# Patient Record
Sex: MALE | Race: WHITE | Employment: FULL TIME | URBAN - METROPOLITAN AREA
[De-identification: names, ages, dates, MRNs, and addresses within clinical notes are randomized per-mention and may not be internally consistent; named-entity substitution may affect disease eponyms.]

---

## 2017-01-13 ENCOUNTER — OFFICE VISIT (OUTPATIENT)
Dept: INTERNAL MEDICINE CLINIC | Age: 42
End: 2017-01-13

## 2017-01-13 VITALS
WEIGHT: 262 LBS | RESPIRATION RATE: 16 BRPM | SYSTOLIC BLOOD PRESSURE: 120 MMHG | BODY MASS INDEX: 35.49 KG/M2 | DIASTOLIC BLOOD PRESSURE: 80 MMHG | HEIGHT: 72 IN | HEART RATE: 96 BPM

## 2017-01-13 DIAGNOSIS — J01.90 ACUTE SINUSITIS, RECURRENCE NOT SPECIFIED, UNSPECIFIED LOCATION: Primary | ICD-10-CM

## 2017-01-13 PROCEDURE — 99213 OFFICE O/P EST LOW 20 MIN: CPT | Performed by: NURSE PRACTITIONER

## 2017-01-13 RX ORDER — AMOXICILLIN AND CLAVULANATE POTASSIUM 875; 125 MG/1; MG/1
1 TABLET, FILM COATED ORAL 2 TIMES DAILY
Qty: 14 TABLET | Refills: 0 | Status: SHIPPED | OUTPATIENT
Start: 2017-01-13 | End: 2017-01-20

## 2017-01-13 ASSESSMENT — ENCOUNTER SYMPTOMS
SHORTNESS OF BREATH: 0
NAUSEA: 0
SORE THROAT: 0
SWOLLEN GLANDS: 0
VOMITING: 0
EYE REDNESS: 1
EYE PAIN: 1
DIARRHEA: 0
SINUS PRESSURE: 1
COUGH: 0

## 2017-02-13 ENCOUNTER — TELEPHONE (OUTPATIENT)
Dept: INTERNAL MEDICINE CLINIC | Age: 42
End: 2017-02-13

## 2017-02-13 ENCOUNTER — OFFICE VISIT (OUTPATIENT)
Dept: INTERNAL MEDICINE CLINIC | Age: 42
End: 2017-02-13

## 2017-02-13 VITALS
SYSTOLIC BLOOD PRESSURE: 110 MMHG | DIASTOLIC BLOOD PRESSURE: 89 MMHG | HEIGHT: 72 IN | RESPIRATION RATE: 18 BRPM | BODY MASS INDEX: 34.13 KG/M2 | HEART RATE: 75 BPM | WEIGHT: 252 LBS

## 2017-02-13 DIAGNOSIS — A04.72 C. DIFFICILE COLITIS: ICD-10-CM

## 2017-02-13 DIAGNOSIS — I10 ESSENTIAL HYPERTENSION: ICD-10-CM

## 2017-02-13 DIAGNOSIS — L97.519 DIABETIC ULCER OF BOTH FEET ASSOCIATED WITH TYPE 2 DIABETES MELLITUS (HCC): ICD-10-CM

## 2017-02-13 DIAGNOSIS — E11.621 DIABETIC ULCER OF BOTH FEET ASSOCIATED WITH TYPE 2 DIABETES MELLITUS (HCC): ICD-10-CM

## 2017-02-13 DIAGNOSIS — E11.42 DIABETIC PERIPHERAL NEUROPATHY ASSOCIATED WITH TYPE 2 DIABETES MELLITUS (HCC): ICD-10-CM

## 2017-02-13 DIAGNOSIS — L97.529 DIABETIC ULCER OF BOTH FEET ASSOCIATED WITH TYPE 2 DIABETES MELLITUS (HCC): ICD-10-CM

## 2017-02-13 DIAGNOSIS — A04.72 C. DIFFICILE COLITIS: Primary | ICD-10-CM

## 2017-02-13 PROCEDURE — 99214 OFFICE O/P EST MOD 30 MIN: CPT | Performed by: INTERNAL MEDICINE

## 2017-02-13 RX ORDER — GABAPENTIN 300 MG/1
300 CAPSULE ORAL 3 TIMES DAILY
Qty: 180 CAPSULE | Refills: 2 | Status: SHIPPED
Start: 2017-02-13 | End: 2018-01-11 | Stop reason: SDUPTHER

## 2017-02-13 RX ORDER — LACTOBACILLUS RHAMNOSUS GG 10B CELL
1 CAPSULE ORAL 2 TIMES DAILY
Qty: 60 CAPSULE | Refills: 0 | Status: SHIPPED | OUTPATIENT
Start: 2017-02-13 | End: 2017-03-15

## 2017-02-13 RX ORDER — ONDANSETRON 4 MG/1
4 TABLET, ORALLY DISINTEGRATING ORAL EVERY 8 HOURS PRN
Qty: 30 TABLET | Refills: 0 | Status: SHIPPED | OUTPATIENT
Start: 2017-02-13 | End: 2017-02-20

## 2017-02-13 RX ORDER — METRONIDAZOLE 500 MG/1
500 TABLET ORAL 3 TIMES DAILY
Qty: 30 TABLET | Refills: 0 | Status: SHIPPED | OUTPATIENT
Start: 2017-02-13 | End: 2017-02-23

## 2017-02-14 LAB
A/G RATIO: 1.6 (ref 1.1–2.2)
ALBUMIN SERPL-MCNC: 4.3 G/DL (ref 3.4–5)
ALP BLD-CCNC: 117 U/L (ref 40–129)
ALT SERPL-CCNC: 29 U/L (ref 10–40)
ANION GAP SERPL CALCULATED.3IONS-SCNC: 20 MMOL/L (ref 3–16)
AST SERPL-CCNC: 15 U/L (ref 15–37)
BASOPHILS ABSOLUTE: 0.1 K/UL (ref 0–0.2)
BASOPHILS RELATIVE PERCENT: 0.9 %
BILIRUB SERPL-MCNC: 2.5 MG/DL (ref 0–1)
BUN BLDV-MCNC: 10 MG/DL (ref 7–20)
CALCIUM SERPL-MCNC: 9.4 MG/DL (ref 8.3–10.6)
CHLORIDE BLD-SCNC: 94 MMOL/L (ref 99–110)
CO2: 20 MMOL/L (ref 21–32)
CREAT SERPL-MCNC: 0.7 MG/DL (ref 0.9–1.3)
EOSINOPHILS ABSOLUTE: 0.1 K/UL (ref 0–0.6)
EOSINOPHILS RELATIVE PERCENT: 1.2 %
ESTIMATED AVERAGE GLUCOSE: 263.3 MG/DL
GFR AFRICAN AMERICAN: >60
GFR NON-AFRICAN AMERICAN: >60
GLOBULIN: 2.7 G/DL
GLUCOSE BLD-MCNC: 413 MG/DL (ref 70–99)
HBA1C MFR BLD: 10.8 %
HCT VFR BLD CALC: 50.2 % (ref 40.5–52.5)
HEMOGLOBIN: 16.9 G/DL (ref 13.5–17.5)
LYMPHOCYTES ABSOLUTE: 2.2 K/UL (ref 1–5.1)
LYMPHOCYTES RELATIVE PERCENT: 25 %
MCH RBC QN AUTO: 31.9 PG (ref 26–34)
MCHC RBC AUTO-ENTMCNC: 33.7 G/DL (ref 31–36)
MCV RBC AUTO: 94.7 FL (ref 80–100)
MONOCYTES ABSOLUTE: 0.7 K/UL (ref 0–1.3)
MONOCYTES RELATIVE PERCENT: 7.7 %
NEUTROPHILS ABSOLUTE: 5.7 K/UL (ref 1.7–7.7)
NEUTROPHILS RELATIVE PERCENT: 65.2 %
PDW BLD-RTO: 13.6 % (ref 12.4–15.4)
PLATELET # BLD: 195 K/UL (ref 135–450)
PMV BLD AUTO: 8.6 FL (ref 5–10.5)
POTASSIUM SERPL-SCNC: 4.3 MMOL/L (ref 3.5–5.1)
RBC # BLD: 5.3 M/UL (ref 4.2–5.9)
SODIUM BLD-SCNC: 134 MMOL/L (ref 136–145)
TOTAL PROTEIN: 7 G/DL (ref 6.4–8.2)
WBC # BLD: 8.7 K/UL (ref 4–11)

## 2017-02-27 DIAGNOSIS — I42.9 CARDIOMYOPATHY (HCC): ICD-10-CM

## 2017-02-27 RX ORDER — CARVEDILOL 25 MG/1
TABLET ORAL
Qty: 120 TABLET | Refills: 1 | Status: SHIPPED | OUTPATIENT
Start: 2017-02-27 | End: 2017-09-20

## 2017-03-21 DIAGNOSIS — F41.9 ANXIETY: ICD-10-CM

## 2017-03-21 RX ORDER — PAROXETINE 10 MG/1
TABLET, FILM COATED ORAL
Qty: 30 TABLET | Refills: 3 | Status: SHIPPED | OUTPATIENT
Start: 2017-03-21 | End: 2017-09-29 | Stop reason: SDUPTHER

## 2017-09-29 ENCOUNTER — OFFICE VISIT (OUTPATIENT)
Dept: INTERNAL MEDICINE CLINIC | Age: 42
End: 2017-09-29

## 2017-09-29 VITALS
WEIGHT: 246 LBS | DIASTOLIC BLOOD PRESSURE: 70 MMHG | HEIGHT: 72 IN | RESPIRATION RATE: 14 BRPM | HEART RATE: 70 BPM | SYSTOLIC BLOOD PRESSURE: 122 MMHG | BODY MASS INDEX: 33.32 KG/M2

## 2017-09-29 DIAGNOSIS — L97.519 DIABETIC ULCER OF BOTH FEET ASSOCIATED WITH TYPE 2 DIABETES MELLITUS (HCC): Primary | ICD-10-CM

## 2017-09-29 DIAGNOSIS — E78.2 MIXED HYPERLIPIDEMIA: ICD-10-CM

## 2017-09-29 DIAGNOSIS — L97.529 DIABETIC ULCER OF BOTH FEET ASSOCIATED WITH TYPE 2 DIABETES MELLITUS (HCC): Primary | ICD-10-CM

## 2017-09-29 DIAGNOSIS — E11.42 TYPE 2 DIABETES MELLITUS WITH DIABETIC POLYNEUROPATHY, WITH LONG-TERM CURRENT USE OF INSULIN (HCC): ICD-10-CM

## 2017-09-29 DIAGNOSIS — E11.621 DIABETIC ULCER OF BOTH FEET ASSOCIATED WITH TYPE 2 DIABETES MELLITUS (HCC): Primary | ICD-10-CM

## 2017-09-29 DIAGNOSIS — F41.9 ANXIETY: ICD-10-CM

## 2017-09-29 DIAGNOSIS — Z79.4 TYPE 2 DIABETES MELLITUS WITH DIABETIC POLYNEUROPATHY, WITH LONG-TERM CURRENT USE OF INSULIN (HCC): ICD-10-CM

## 2017-09-29 DIAGNOSIS — I10 ESSENTIAL HYPERTENSION: ICD-10-CM

## 2017-09-29 PROCEDURE — 99214 OFFICE O/P EST MOD 30 MIN: CPT | Performed by: NURSE PRACTITIONER

## 2017-09-29 RX ORDER — PAROXETINE 10 MG/1
10 TABLET, FILM COATED ORAL EVERY MORNING
Qty: 30 TABLET | Refills: 3 | Status: SHIPPED | OUTPATIENT
Start: 2017-09-29 | End: 2017-11-09 | Stop reason: SDUPTHER

## 2017-09-29 RX ORDER — CARVEDILOL 25 MG/1
25 TABLET ORAL 2 TIMES DAILY WITH MEALS
Qty: 30 TABLET | Refills: 1 | Status: SHIPPED | OUTPATIENT
Start: 2017-09-29 | End: 2017-11-09 | Stop reason: SDUPTHER

## 2017-09-29 ASSESSMENT — ENCOUNTER SYMPTOMS
COUGH: 0
DIARRHEA: 0
NAUSEA: 0
SHORTNESS OF BREATH: 0
VOMITING: 0
ABDOMINAL PAIN: 0

## 2017-11-09 ENCOUNTER — OFFICE VISIT (OUTPATIENT)
Dept: INTERNAL MEDICINE CLINIC | Age: 42
End: 2017-11-09

## 2017-11-09 VITALS
RESPIRATION RATE: 18 BRPM | HEART RATE: 115 BPM | WEIGHT: 246 LBS | HEIGHT: 72 IN | SYSTOLIC BLOOD PRESSURE: 155 MMHG | DIASTOLIC BLOOD PRESSURE: 90 MMHG | BODY MASS INDEX: 33.32 KG/M2

## 2017-11-09 DIAGNOSIS — F41.9 ANXIETY: ICD-10-CM

## 2017-11-09 DIAGNOSIS — I42.0 DILATED CARDIOMYOPATHY (HCC): ICD-10-CM

## 2017-11-09 DIAGNOSIS — Z79.4 TYPE 2 DIABETES MELLITUS WITH DIABETIC POLYNEUROPATHY, WITH LONG-TERM CURRENT USE OF INSULIN (HCC): ICD-10-CM

## 2017-11-09 DIAGNOSIS — I10 ESSENTIAL HYPERTENSION: ICD-10-CM

## 2017-11-09 DIAGNOSIS — E11.42 TYPE 2 DIABETES MELLITUS WITH DIABETIC POLYNEUROPATHY, WITH LONG-TERM CURRENT USE OF INSULIN (HCC): ICD-10-CM

## 2017-11-09 DIAGNOSIS — E78.2 MIXED HYPERLIPIDEMIA: ICD-10-CM

## 2017-11-09 DIAGNOSIS — E11.42 DIABETIC PERIPHERAL NEUROPATHY ASSOCIATED WITH TYPE 2 DIABETES MELLITUS (HCC): ICD-10-CM

## 2017-11-09 DIAGNOSIS — F32.4 MAJOR DEPRESSIVE DISORDER WITH SINGLE EPISODE, IN PARTIAL REMISSION (HCC): ICD-10-CM

## 2017-11-09 LAB
A/G RATIO: 2 (ref 1.1–2.2)
ALBUMIN SERPL-MCNC: 4.6 G/DL (ref 3.4–5)
ALP BLD-CCNC: 142 U/L (ref 40–129)
ALT SERPL-CCNC: 35 U/L (ref 10–40)
ANION GAP SERPL CALCULATED.3IONS-SCNC: 18 MMOL/L (ref 3–16)
AST SERPL-CCNC: 15 U/L (ref 15–37)
BASOPHILS ABSOLUTE: 0.1 K/UL (ref 0–0.2)
BASOPHILS RELATIVE PERCENT: 0.8 %
BILIRUB SERPL-MCNC: 2.9 MG/DL (ref 0–1)
BUN BLDV-MCNC: 13 MG/DL (ref 7–20)
CALCIUM SERPL-MCNC: 9.7 MG/DL (ref 8.3–10.6)
CHLORIDE BLD-SCNC: 94 MMOL/L (ref 99–110)
CO2: 23 MMOL/L (ref 21–32)
CREAT SERPL-MCNC: 0.5 MG/DL (ref 0.9–1.3)
CREATININE URINE: 40.7 MG/DL (ref 39–259)
EOSINOPHILS ABSOLUTE: 0.2 K/UL (ref 0–0.6)
EOSINOPHILS RELATIVE PERCENT: 1.6 %
GFR AFRICAN AMERICAN: >60
GFR NON-AFRICAN AMERICAN: >60
GLOBULIN: 2.3 G/DL
GLUCOSE BLD-MCNC: 477 MG/DL (ref 70–99)
HCT VFR BLD CALC: 46.6 % (ref 40.5–52.5)
HEMOGLOBIN: 16.3 G/DL (ref 13.5–17.5)
LYMPHOCYTES ABSOLUTE: 2.5 K/UL (ref 1–5.1)
LYMPHOCYTES RELATIVE PERCENT: 23.2 %
MCH RBC QN AUTO: 31.9 PG (ref 26–34)
MCHC RBC AUTO-ENTMCNC: 35 G/DL (ref 31–36)
MCV RBC AUTO: 91.1 FL (ref 80–100)
MICROALBUMIN UR-MCNC: <1.2 MG/DL
MICROALBUMIN/CREAT UR-RTO: NORMAL MG/G (ref 0–30)
MONOCYTES ABSOLUTE: 0.8 K/UL (ref 0–1.3)
MONOCYTES RELATIVE PERCENT: 7.3 %
NEUTROPHILS ABSOLUTE: 7.2 K/UL (ref 1.7–7.7)
NEUTROPHILS RELATIVE PERCENT: 67.1 %
PDW BLD-RTO: 13 % (ref 12.4–15.4)
PLATELET # BLD: 220 K/UL (ref 135–450)
PMV BLD AUTO: 8.8 FL (ref 5–10.5)
POTASSIUM SERPL-SCNC: 4.2 MMOL/L (ref 3.5–5.1)
RBC # BLD: 5.11 M/UL (ref 4.2–5.9)
SODIUM BLD-SCNC: 135 MMOL/L (ref 136–145)
TOTAL PROTEIN: 6.9 G/DL (ref 6.4–8.2)
WBC # BLD: 10.7 K/UL (ref 4–11)

## 2017-11-09 PROCEDURE — 99214 OFFICE O/P EST MOD 30 MIN: CPT | Performed by: INTERNAL MEDICINE

## 2017-11-09 PROCEDURE — 81002 URINALYSIS NONAUTO W/O SCOPE: CPT | Performed by: INTERNAL MEDICINE

## 2017-11-09 RX ORDER — LOSARTAN POTASSIUM 50 MG/1
50 TABLET ORAL DAILY
Qty: 30 TABLET | Refills: 5 | Status: SHIPPED | OUTPATIENT
Start: 2017-11-09 | End: 2018-02-27 | Stop reason: SDUPTHER

## 2017-11-09 RX ORDER — SIMVASTATIN 20 MG
TABLET ORAL
Qty: 30 TABLET | Refills: 5 | Status: SHIPPED | OUTPATIENT
Start: 2017-11-09 | End: 2018-02-27 | Stop reason: SDUPTHER

## 2017-11-09 RX ORDER — CARVEDILOL 25 MG/1
25 TABLET ORAL 2 TIMES DAILY WITH MEALS
Qty: 30 TABLET | Refills: 5 | Status: SHIPPED | OUTPATIENT
Start: 2017-11-09 | End: 2018-01-11 | Stop reason: SDUPTHER

## 2017-11-09 RX ORDER — PAROXETINE 10 MG/1
10 TABLET, FILM COATED ORAL EVERY MORNING
Qty: 30 TABLET | Refills: 5 | Status: SHIPPED | OUTPATIENT
Start: 2017-11-09 | End: 2018-02-27 | Stop reason: SDUPTHER

## 2017-11-09 RX ORDER — GLIMEPIRIDE 4 MG/1
4 TABLET ORAL
Qty: 30 TABLET | Refills: 5 | Status: SHIPPED | OUTPATIENT
Start: 2017-11-09 | End: 2018-02-27 | Stop reason: SDUPTHER

## 2017-11-09 ASSESSMENT — PATIENT HEALTH QUESTIONNAIRE - PHQ9
SUM OF ALL RESPONSES TO PHQ9 QUESTIONS 1 & 2: 0
1. LITTLE INTEREST OR PLEASURE IN DOING THINGS: 0
2. FEELING DOWN, DEPRESSED OR HOPELESS: 0
SUM OF ALL RESPONSES TO PHQ QUESTIONS 1-9: 0

## 2017-11-09 NOTE — TELEPHONE ENCOUNTER
----- Message from Ravi Hanna MD sent at 11/9/2017  4:28 PM EST -----  Contact: [phone]  Yes please    ----- Message -----  From: Brayden Gonzalez  Sent: 11/9/2017   3:15 PM  To: Ravi Hanna MD    Pharmacy called stating Pt's carvedilol (COREG) 25 MG tablet directions are for bid. Ok to change quantity to #60 instead of 30?
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
## 2017-11-10 LAB
ESTIMATED AVERAGE GLUCOSE: 243.2 MG/DL
HBA1C MFR BLD: 10.1 %

## 2017-11-10 RX ORDER — CARVEDILOL 25 MG/1
25 TABLET ORAL 2 TIMES DAILY WITH MEALS
Qty: 60 TABLET | Refills: 5 | OUTPATIENT
Start: 2017-11-10

## 2017-11-13 NOTE — TELEPHONE ENCOUNTER
----- Message from Matthew Crisostomo sent at 11/13/2017  3:05 PM EST -----  Contact: [facility] called in to [place] [phone]  For test strips and lancets  Contour Next EZ

## 2017-11-15 RX ORDER — LANCETS 30 GAUGE
EACH MISCELLANEOUS
Qty: 100 EACH | Refills: 11 | Status: SHIPPED | OUTPATIENT
Start: 2017-11-15 | End: 2017-11-16 | Stop reason: SDUPTHER

## 2017-11-16 RX ORDER — LANCETS 30 GAUGE
EACH MISCELLANEOUS
Qty: 100 EACH | Refills: 11 | Status: SHIPPED | OUTPATIENT
Start: 2017-11-16 | End: 2020-10-26

## 2018-01-10 ENCOUNTER — TELEPHONE (OUTPATIENT)
Dept: INTERNAL MEDICINE CLINIC | Age: 43
End: 2018-01-10

## 2018-01-11 ENCOUNTER — OFFICE VISIT (OUTPATIENT)
Dept: INTERNAL MEDICINE CLINIC | Age: 43
End: 2018-01-11

## 2018-01-11 VITALS
SYSTOLIC BLOOD PRESSURE: 160 MMHG | BODY MASS INDEX: 34.81 KG/M2 | WEIGHT: 257 LBS | HEART RATE: 90 BPM | DIASTOLIC BLOOD PRESSURE: 110 MMHG | HEIGHT: 72 IN

## 2018-01-11 DIAGNOSIS — G89.4 CHRONIC PAIN SYNDROME: ICD-10-CM

## 2018-01-11 DIAGNOSIS — Z79.4 TYPE 2 DIABETES MELLITUS WITH DIABETIC POLYNEUROPATHY, WITH LONG-TERM CURRENT USE OF INSULIN (HCC): ICD-10-CM

## 2018-01-11 DIAGNOSIS — E11.42 TYPE 2 DIABETES MELLITUS WITH DIABETIC POLYNEUROPATHY, WITH LONG-TERM CURRENT USE OF INSULIN (HCC): ICD-10-CM

## 2018-01-11 DIAGNOSIS — E11.42 DIABETIC PERIPHERAL NEUROPATHY ASSOCIATED WITH TYPE 2 DIABETES MELLITUS (HCC): Primary | ICD-10-CM

## 2018-01-11 PROCEDURE — 99214 OFFICE O/P EST MOD 30 MIN: CPT | Performed by: PHYSICIAN ASSISTANT

## 2018-01-11 RX ORDER — CARVEDILOL 25 MG/1
25 TABLET ORAL 2 TIMES DAILY WITH MEALS
Qty: 60 TABLET | Refills: 1 | Status: SHIPPED | OUTPATIENT
Start: 2018-01-11 | End: 2018-02-27 | Stop reason: SDUPTHER

## 2018-01-11 RX ORDER — TIZANIDINE 4 MG/1
4 TABLET ORAL NIGHTLY PRN
Qty: 30 TABLET | Refills: 1 | Status: SHIPPED | OUTPATIENT
Start: 2018-01-11 | End: 2018-02-27 | Stop reason: SDUPTHER

## 2018-01-11 RX ORDER — TIZANIDINE 4 MG/1
4 TABLET ORAL NIGHTLY PRN
COMMUNITY
End: 2018-01-11 | Stop reason: SDUPTHER

## 2018-01-11 RX ORDER — GABAPENTIN 300 MG/1
600 CAPSULE ORAL 3 TIMES DAILY
Qty: 180 CAPSULE | Refills: 1 | Status: SHIPPED | OUTPATIENT
Start: 2018-01-11 | End: 2018-03-06 | Stop reason: SDUPTHER

## 2018-01-11 ASSESSMENT — ENCOUNTER SYMPTOMS
SHORTNESS OF BREATH: 0
BACK PAIN: 0

## 2018-01-11 NOTE — PROGRESS NOTES
Chief Complaint   Patient presents with    Peripheral Neuropathy        HPI  Carlos Alberto Landeros is a 38 y.o. male who presents for evaluation of peripheral neuropathy. He used to follow with Dr. Da Martel. There was an issue with a missed appointment, followed by running out of opiates, followed by a visit to Dr. Da Martel with a negative drug screen which prompted discharge from the practice. He has been without his gabapentin, morphine, and oxycodone     Also needs Tujeo adjustment. Has been having fasting sugars in the mornings to 220 and during the day random sugars are around 180. He has noticed lows when he does not eat, but if he is eating he is consistently high     Review of Systems   Constitutional: Negative for chills and fever. Respiratory: Negative for shortness of breath. Cardiovascular: Negative for chest pain. Musculoskeletal: Negative for back pain and neck pain. + neuropathic pain        Allergies  Januvia [sitagliptin] and Mustard oil [allyl isothiocyanate]      Vitals    Vitals:    01/11/18 0940 01/11/18 1023   BP: (!) 160/110 (!) 160/110   Site: Right Arm    Position: Sitting    Cuff Size: Medium Adult    Pulse: 90    Weight: 257 lb (116.6 kg)    Height: 6' (1.829 m)         Current Medications  Current Outpatient Prescriptions   Medication Sig Dispense Refill    gabapentin (NEURONTIN) 300 MG capsule Take 2 capsules by mouth 3 times daily for 30 days. 180 capsule 1    tiZANidine (ZANAFLEX) 4 MG tablet Take 1 tablet by mouth nightly as needed (muscle spasms) 30 tablet 1    insulin glargine (TOUJEO SOLOSTAR) 300 UNIT/ML injection pen Inject 15 Units into the skin nightly 1 mL 0    carvedilol (COREG) 25 MG tablet Take 1 tablet by mouth 2 times daily (with meals) 60 tablet 1    glucose blood VI test strips (ASCENSIA AUTODISC VI;ONE TOUCH ULTRA TEST VI) strip Test threes time a day. DX: E11.9 100 each 11    Lancets MISC Test three times a day with Contour Next EZ machine.   DX: E11.9 100 each 11    losartan (COZAAR) 50 MG tablet Take 1 tablet by mouth daily 30 tablet 5    glimepiride (AMARYL) 4 MG tablet Take 1 tablet by mouth every morning (before breakfast) 30 tablet 5    simvastatin (ZOCOR) 20 MG tablet TAKE ONE TABLET BY MOUTH ONCE NIGHTLY 30 tablet 5    PARoxetine (PAXIL) 10 MG tablet Take 1 tablet by mouth every morning 30 tablet 5    insulin lispro (HUMALOG KWIKPEN) 100 UNIT/ML pen Inject 5 Units into the skin 3 times daily (before meals) 5 Pen 3    Insulin Syringe-Needle U-100 (Code42CO INS SYR .3CC/29GX0.5\") 29G X 1/2\" 0.3 ML MISC 1 each by Does not apply route daily. 100 each 3    glucose blood VI test strips (EXACTECH TEST) strip 1 each by In Vitro route daily. As needed. 100 each 0     No current facility-administered medications for this visit. Past Medical History  Past Medical History:   Diagnosis Date    Arthritis     OA    Clostridium difficile infection 11/01/2016    PCR+    Diabetes mellitus ([facility] [address]     TYPE 2- NO MEDS SINCE WEIGHT LOSS    Hyperlipidemia     Hypertension     Medial meniscus tear     LEFT    Osteoarthritis     Type II or unspecified type diabetes mellitus without mention of complication, not stated as uncontrolled        Social History  Social History     Social History    Marital status:      Spouse name: N/A    Number of children: N/A    Years of education: N/A     Occupational History    Not on file.      Social History Main Topics    Smoking status: Former Smoker     Packs/day: 0.50     Years: 2.00     Quit date: 8/13/2005    Smokeless tobacco: Former User     Quit date: 8/13/2005      Comment: SMOKED OCCASIONALLY UNTIL 8 YEARS AGO    Alcohol use Yes      Comment: RARELY    Drug use: No    Sexual activity: Yes     Partners: Female     Other Topics Concern    Not on file     Social History Narrative    No narrative on file       Surgical History  Past Surgical History:   Procedure Laterality Date    KNEE ARTHROSCOPY

## 2018-02-27 ENCOUNTER — OFFICE VISIT (OUTPATIENT)
Dept: INTERNAL MEDICINE CLINIC | Age: 43
End: 2018-02-27

## 2018-02-27 VITALS
HEIGHT: 72 IN | DIASTOLIC BLOOD PRESSURE: 90 MMHG | RESPIRATION RATE: 18 BRPM | WEIGHT: 257 LBS | BODY MASS INDEX: 34.81 KG/M2 | HEART RATE: 70 BPM | SYSTOLIC BLOOD PRESSURE: 145 MMHG

## 2018-02-27 DIAGNOSIS — I10 ESSENTIAL HYPERTENSION: ICD-10-CM

## 2018-02-27 DIAGNOSIS — E11.42 TYPE 2 DIABETES MELLITUS WITH DIABETIC POLYNEUROPATHY, WITH LONG-TERM CURRENT USE OF INSULIN (HCC): Primary | ICD-10-CM

## 2018-02-27 DIAGNOSIS — I42.8 OTHER CARDIOMYOPATHY (HCC): ICD-10-CM

## 2018-02-27 DIAGNOSIS — F41.9 ANXIETY: ICD-10-CM

## 2018-02-27 DIAGNOSIS — E78.2 MIXED HYPERLIPIDEMIA: ICD-10-CM

## 2018-02-27 DIAGNOSIS — E11.42 DIABETIC PERIPHERAL NEUROPATHY ASSOCIATED WITH TYPE 2 DIABETES MELLITUS (HCC): ICD-10-CM

## 2018-02-27 DIAGNOSIS — Z79.4 TYPE 2 DIABETES MELLITUS WITH DIABETIC POLYNEUROPATHY, WITH LONG-TERM CURRENT USE OF INSULIN (HCC): Primary | ICD-10-CM

## 2018-02-27 DIAGNOSIS — Z79.4 TYPE 2 DIABETES MELLITUS WITH DIABETIC POLYNEUROPATHY, WITH LONG-TERM CURRENT USE OF INSULIN (HCC): ICD-10-CM

## 2018-02-27 DIAGNOSIS — E11.42 TYPE 2 DIABETES MELLITUS WITH DIABETIC POLYNEUROPATHY, WITH LONG-TERM CURRENT USE OF INSULIN (HCC): ICD-10-CM

## 2018-02-27 PROCEDURE — 99214 OFFICE O/P EST MOD 30 MIN: CPT | Performed by: INTERNAL MEDICINE

## 2018-02-27 RX ORDER — GLIMEPIRIDE 4 MG/1
4 TABLET ORAL
Qty: 30 TABLET | Refills: 5 | Status: SHIPPED | OUTPATIENT
Start: 2018-02-27 | End: 2018-07-23 | Stop reason: SDUPTHER

## 2018-02-27 RX ORDER — CARVEDILOL 25 MG/1
25 TABLET ORAL 2 TIMES DAILY WITH MEALS
Qty: 60 TABLET | Refills: 3 | Status: SHIPPED | OUTPATIENT
Start: 2018-02-27 | End: 2018-07-23

## 2018-02-27 RX ORDER — SIMVASTATIN 20 MG
TABLET ORAL
Qty: 30 TABLET | Refills: 5 | Status: SHIPPED | OUTPATIENT
Start: 2018-02-27 | End: 2018-07-23 | Stop reason: SDUPTHER

## 2018-02-27 RX ORDER — TIZANIDINE 4 MG/1
4 TABLET ORAL NIGHTLY PRN
Qty: 30 TABLET | Refills: 3 | Status: SHIPPED | OUTPATIENT
Start: 2018-02-27 | End: 2018-06-29 | Stop reason: SDUPTHER

## 2018-02-27 RX ORDER — PAROXETINE 10 MG/1
10 TABLET, FILM COATED ORAL EVERY MORNING
Qty: 30 TABLET | Refills: 5 | Status: SHIPPED | OUTPATIENT
Start: 2018-02-27 | End: 2018-07-23 | Stop reason: SDUPTHER

## 2018-02-27 RX ORDER — LOSARTAN POTASSIUM 50 MG/1
50 TABLET ORAL DAILY
Qty: 30 TABLET | Refills: 5 | Status: SHIPPED | OUTPATIENT
Start: 2018-02-27 | End: 2018-07-23 | Stop reason: SDUPTHER

## 2018-02-27 RX ORDER — DOXYCYCLINE 100 MG/1
100 TABLET ORAL 2 TIMES DAILY
Qty: 10 TABLET | Refills: 0 | Status: SHIPPED | OUTPATIENT
Start: 2018-02-27 | End: 2018-03-04

## 2018-02-27 NOTE — PATIENT INSTRUCTIONS
Patient Self-Management Goal for Health Maintenance  Goal: I will schedule routine eye examinations with an eye specialist as directed by my provider.   Barriers: none  Plan for overcoming my barriers: N/A  Confidence: 10/10  Anticipated Goal Completion Date: 5/27/18

## 2018-02-27 NOTE — PROGRESS NOTES
Chronic Disease Visit Information    BP Readings from Last 3 Encounters:   01/11/18 (!) 160/110   11/09/17 (!) 155/90   09/29/17 122/70          Hemoglobin A1C   Date Value   11/09/2017 10.1 %   02/13/2017 10.8 %   09/14/2015 9.5 % of total Hgb (H)     Microalbumin, Random Urine (mg/dL)   Date Value   11/09/2017 <1.20     LDL Calculated (mg/dL)   Date Value   09/02/2014 see below     HDL (mg/dL)   Date Value   09/02/2014 26 (L)     BUN (mg/dL)   Date Value   11/09/2017 13     CREATININE (mg/dL)   Date Value   11/09/2017 0.5 (L)     Glucose (mg/dL)   Date Value   11/09/2017 477 (H)            Have you changed or started any medications since your last visit including any over-the-counter medicines, vitamins, or herbal medicines? no   Are you having any side effects from any of your medications? -  no  Have you stopped taking any of your medications? Is so, why? -  yes - He has not been taking insulin due to low sugars     Have you seen any other physician or provider since your last visit? No  Have you had any other diagnostic tests since your last visit? No  Have you been seen in the emergency room and/or had an admission to a hospital since we last saw you? No  Have you had your annual diabetic retinal (eye) exam? No  Have you had your routine dental cleaning in the past 6 months? no    Have you activated your [employer] account? If not, what are your barriers?  Yes     Patient Care Team:  Diego Hernandez MD as PCP - General (Internal Medicine)         Medical History Review  Past Medical, Family, and Social History reviewed and does not contribute to the patient presenting condition    Health Maintenance   Topic Date Due    Diabetic retinal exam  10/04/1987    HIV screen  10/04/1992    DTaP/Tdap/Td vaccine (1 - Tdap) 10/04/1996    Pneumococcal med risk (1 of 1 - PPSV23) 10/04/1996    Lipid screen  09/02/2015    Diabetic foot exam  04/19/2017    Flu vaccine (1) 09/01/2017    A1C test (Diabetic or

## 2018-02-28 LAB
A/G RATIO: 1.8 (ref 1.1–2.2)
ALBUMIN SERPL-MCNC: 4.2 G/DL (ref 3.4–5)
ALP BLD-CCNC: 141 U/L (ref 40–129)
ALT SERPL-CCNC: 24 U/L (ref 10–40)
ANION GAP SERPL CALCULATED.3IONS-SCNC: 17 MMOL/L (ref 3–16)
AST SERPL-CCNC: 14 U/L (ref 15–37)
BILIRUB SERPL-MCNC: 1.1 MG/DL (ref 0–1)
BUN BLDV-MCNC: 11 MG/DL (ref 7–20)
CALCIUM SERPL-MCNC: 8.8 MG/DL (ref 8.3–10.6)
CHLORIDE BLD-SCNC: 94 MMOL/L (ref 99–110)
CO2: 25 MMOL/L (ref 21–32)
CREAT SERPL-MCNC: 0.6 MG/DL (ref 0.9–1.3)
ESTIMATED AVERAGE GLUCOSE: 240.3 MG/DL
GFR AFRICAN AMERICAN: >60
GFR NON-AFRICAN AMERICAN: >60
GLOBULIN: 2.4 G/DL
GLUCOSE BLD-MCNC: 384 MG/DL (ref 70–99)
HBA1C MFR BLD: 10 %
POTASSIUM SERPL-SCNC: 4.2 MMOL/L (ref 3.5–5.1)
SODIUM BLD-SCNC: 136 MMOL/L (ref 136–145)
TOTAL PROTEIN: 6.6 G/DL (ref 6.4–8.2)
URIC ACID, SERUM: 4.3 MG/DL (ref 3.5–7.2)

## 2018-03-07 RX ORDER — GABAPENTIN 300 MG/1
600 CAPSULE ORAL 3 TIMES DAILY
Qty: 180 CAPSULE | Refills: 1 | Status: SHIPPED | OUTPATIENT
Start: 2018-03-07 | End: 2018-05-02 | Stop reason: SDUPTHER

## 2018-05-03 RX ORDER — GABAPENTIN 300 MG/1
600 CAPSULE ORAL 3 TIMES DAILY
Qty: 180 CAPSULE | Refills: 1 | Status: SHIPPED | OUTPATIENT
Start: 2018-05-03 | End: 2018-06-29 | Stop reason: SDUPTHER

## 2018-06-29 RX ORDER — TIZANIDINE 4 MG/1
4 TABLET ORAL NIGHTLY PRN
Qty: 30 TABLET | Refills: 0 | Status: SHIPPED | OUTPATIENT
Start: 2018-06-29 | End: 2018-07-23

## 2018-06-29 RX ORDER — GABAPENTIN 300 MG/1
600 CAPSULE ORAL 3 TIMES DAILY
Qty: 180 CAPSULE | Refills: 1 | Status: SHIPPED | OUTPATIENT
Start: 2018-06-29 | End: 2018-07-02 | Stop reason: SDUPTHER

## 2018-06-29 RX ORDER — GABAPENTIN 300 MG/1
600 CAPSULE ORAL 3 TIMES DAILY
Qty: 180 CAPSULE | Refills: 1 | OUTPATIENT
Start: 2018-06-29 | End: 2018-07-29

## 2018-07-02 DIAGNOSIS — E11.42 DIABETIC PERIPHERAL NEUROPATHY ASSOCIATED WITH TYPE 2 DIABETES MELLITUS (HCC): Primary | ICD-10-CM

## 2018-07-02 RX ORDER — GABAPENTIN 300 MG/1
600 CAPSULE ORAL 3 TIMES DAILY
Qty: 180 CAPSULE | Refills: 0 | Status: SHIPPED | OUTPATIENT
Start: 2018-07-02 | End: 2018-09-22 | Stop reason: SDUPTHER

## 2018-07-23 ENCOUNTER — OFFICE VISIT (OUTPATIENT)
Dept: INTERNAL MEDICINE CLINIC | Age: 43
End: 2018-07-23

## 2018-07-23 VITALS
HEIGHT: 72 IN | RESPIRATION RATE: 18 BRPM | DIASTOLIC BLOOD PRESSURE: 75 MMHG | WEIGHT: 260 LBS | BODY MASS INDEX: 35.21 KG/M2 | SYSTOLIC BLOOD PRESSURE: 140 MMHG | HEART RATE: 95 BPM

## 2018-07-23 DIAGNOSIS — E11.42 TYPE 2 DIABETES MELLITUS WITH DIABETIC POLYNEUROPATHY, WITH LONG-TERM CURRENT USE OF INSULIN (HCC): ICD-10-CM

## 2018-07-23 DIAGNOSIS — I42.0 DILATED CARDIOMYOPATHY (HCC): ICD-10-CM

## 2018-07-23 DIAGNOSIS — I10 ESSENTIAL HYPERTENSION: ICD-10-CM

## 2018-07-23 DIAGNOSIS — Z79.4 TYPE 2 DIABETES MELLITUS WITH DIABETIC POLYNEUROPATHY, WITH LONG-TERM CURRENT USE OF INSULIN (HCC): ICD-10-CM

## 2018-07-23 DIAGNOSIS — I50.22 CHRONIC SYSTOLIC CONGESTIVE HEART FAILURE (HCC): Primary | ICD-10-CM

## 2018-07-23 DIAGNOSIS — F41.9 ANXIETY: ICD-10-CM

## 2018-07-23 DIAGNOSIS — I50.22 CHRONIC SYSTOLIC CONGESTIVE HEART FAILURE (HCC): ICD-10-CM

## 2018-07-23 LAB
A/G RATIO: 2.3 (ref 1.1–2.2)
ALBUMIN SERPL-MCNC: 4.3 G/DL (ref 3.4–5)
ALP BLD-CCNC: 82 U/L (ref 40–129)
ALT SERPL-CCNC: 26 U/L (ref 10–40)
ANION GAP SERPL CALCULATED.3IONS-SCNC: 16 MMOL/L (ref 3–16)
AST SERPL-CCNC: 21 U/L (ref 15–37)
BASOPHILS ABSOLUTE: 0.1 K/UL (ref 0–0.2)
BASOPHILS RELATIVE PERCENT: 0.6 %
BILIRUB SERPL-MCNC: 2.8 MG/DL (ref 0–1)
BUN BLDV-MCNC: 13 MG/DL (ref 7–20)
CALCIUM SERPL-MCNC: 9.5 MG/DL (ref 8.3–10.6)
CHLORIDE BLD-SCNC: 98 MMOL/L (ref 99–110)
CO2: 23 MMOL/L (ref 21–32)
CREAT SERPL-MCNC: 0.6 MG/DL (ref 0.9–1.3)
EOSINOPHILS ABSOLUTE: 0.1 K/UL (ref 0–0.6)
EOSINOPHILS RELATIVE PERCENT: 0.6 %
GFR AFRICAN AMERICAN: >60
GFR NON-AFRICAN AMERICAN: >60
GLOBULIN: 1.9 G/DL
GLUCOSE BLD-MCNC: 305 MG/DL (ref 70–99)
HCT VFR BLD CALC: 44.1 % (ref 40.5–52.5)
HEMOGLOBIN: 15.2 G/DL (ref 13.5–17.5)
LYMPHOCYTES ABSOLUTE: 2.1 K/UL (ref 1–5.1)
LYMPHOCYTES RELATIVE PERCENT: 20.2 %
MCH RBC QN AUTO: 32.9 PG (ref 26–34)
MCHC RBC AUTO-ENTMCNC: 34.5 G/DL (ref 31–36)
MCV RBC AUTO: 95.3 FL (ref 80–100)
MONOCYTES ABSOLUTE: 0.8 K/UL (ref 0–1.3)
MONOCYTES RELATIVE PERCENT: 7.5 %
NEUTROPHILS ABSOLUTE: 7.3 K/UL (ref 1.7–7.7)
NEUTROPHILS RELATIVE PERCENT: 71.1 %
PDW BLD-RTO: 14.1 % (ref 12.4–15.4)
PLATELET # BLD: 249 K/UL (ref 135–450)
PMV BLD AUTO: 8.5 FL (ref 5–10.5)
POTASSIUM SERPL-SCNC: 4.6 MMOL/L (ref 3.5–5.1)
PRO-BNP: 981 PG/ML (ref 0–124)
RBC # BLD: 4.63 M/UL (ref 4.2–5.9)
SODIUM BLD-SCNC: 137 MMOL/L (ref 136–145)
TOTAL PROTEIN: 6.2 G/DL (ref 6.4–8.2)
WBC # BLD: 10.2 K/UL (ref 4–11)

## 2018-07-23 PROCEDURE — 99213 OFFICE O/P EST LOW 20 MIN: CPT | Performed by: INTERNAL MEDICINE

## 2018-07-23 RX ORDER — SIMVASTATIN 20 MG
TABLET ORAL
Qty: 30 TABLET | Refills: 5 | Status: SHIPPED | OUTPATIENT
Start: 2018-07-23 | End: 2018-12-21 | Stop reason: SDUPTHER

## 2018-07-23 RX ORDER — LOSARTAN POTASSIUM 50 MG/1
50 TABLET ORAL DAILY
Qty: 30 TABLET | Refills: 5 | Status: SHIPPED | OUTPATIENT
Start: 2018-07-23 | End: 2018-10-09 | Stop reason: SDUPTHER

## 2018-07-23 RX ORDER — CARVEDILOL 12.5 MG/1
12.5 TABLET ORAL 2 TIMES DAILY WITH MEALS
Qty: 180 TABLET | Refills: 0 | Status: SHIPPED | OUTPATIENT
Start: 2018-07-23 | End: 2018-12-21 | Stop reason: ALTCHOICE

## 2018-07-23 RX ORDER — GLIMEPIRIDE 4 MG/1
4 TABLET ORAL
Qty: 30 TABLET | Refills: 5 | Status: SHIPPED | OUTPATIENT
Start: 2018-07-23 | End: 2018-12-21 | Stop reason: SDUPTHER

## 2018-07-23 RX ORDER — PAROXETINE 10 MG/1
10 TABLET, FILM COATED ORAL EVERY MORNING
Qty: 30 TABLET | Refills: 5 | Status: SHIPPED | OUTPATIENT
Start: 2018-07-23 | End: 2018-12-21 | Stop reason: SDUPTHER

## 2018-07-23 RX ORDER — FUROSEMIDE 20 MG/1
20 TABLET ORAL DAILY
Qty: 30 TABLET | Refills: 5 | Status: SHIPPED | OUTPATIENT
Start: 2018-07-23 | End: 2018-12-17 | Stop reason: SDUPTHER

## 2018-07-23 RX ORDER — TIZANIDINE 4 MG/1
4 TABLET ORAL NIGHTLY PRN
Qty: 30 TABLET | Refills: 5 | Status: CANCELLED | OUTPATIENT
Start: 2018-07-23 | End: 2018-08-22

## 2018-07-23 NOTE — PROGRESS NOTES
congestive heart failure (HCC)  COMPREHENSIVE METABOLIC PANEL    CBC WITH AUTO DIFFERENTIAL    BRAIN NATRIURETIC PEPTIDE (BNP)    Echocardiogram complete   2. Type 2 diabetes mellitus with diabetic polyneuropathy, with long-term current use of insulin ([facility])  glimepiride (AMARYL) 4 MG tablet    Hemoglobin A1c   3. Anxiety  PARoxetine (PAXIL) 10 MG tablet   4. Dilated cardiomyopathy ([facility] [address]     5.  Essential hypertension           Wt Readings from Last 3 Encounters:   07/23/18 260 lb (117.9 kg)   02/27/18 257 lb (116.6 kg)   01/11/18 257 lb (116.6 kg)       Cardiomyopathy - non ischemic , suspect acute on chronic CHF systolic  - resume lasix, check BNP,     - resume coreg, losartan   - need to be complaint  - repeat echo     DM- 2 chronically uncontrolled with symptoms and complications  - peripheral neuropathy, ED , skin ulcers  - should monitor better and take insulin regularly  - continue toujeo 12 at night, resume oral meds     Need eye exam    - continue statins, ACEI, need yearly eye exam        HTN - , doubt compliance -coreg, losartan     Peripheral neuropathy - from uncontrolled DM  - on gabapentin, off pain mx    Chronic diabetic ulcers  - no active cellulitis but has several open ulcers  - refer to wound care  again    ED - prn viagra    Depression - on paxil    F/w 3 months

## 2018-07-24 ENCOUNTER — TELEPHONE (OUTPATIENT)
Dept: INTERNAL MEDICINE CLINIC | Age: 43
End: 2018-07-24

## 2018-07-24 DIAGNOSIS — R79.89 ABNORMAL LFTS: Primary | ICD-10-CM

## 2018-07-24 LAB
ESTIMATED AVERAGE GLUCOSE: 208.7 MG/DL
HBA1C MFR BLD: 8.9 %

## 2018-07-26 RX ORDER — TIZANIDINE 4 MG/1
4 TABLET ORAL NIGHTLY PRN
Qty: 30 TABLET | Refills: 0 | OUTPATIENT
Start: 2018-07-26 | End: 2018-08-25

## 2018-07-27 RX ORDER — TIZANIDINE 4 MG/1
4 TABLET ORAL NIGHTLY PRN
Qty: 30 TABLET | Refills: 2 | Status: SHIPPED | OUTPATIENT
Start: 2018-07-27 | End: 2018-09-17 | Stop reason: SDUPTHER

## 2018-08-10 ENCOUNTER — HOSPITAL ENCOUNTER (OUTPATIENT)
Dept: GENERAL RADIOLOGY | Age: 43
Discharge: HOME | End: 2018-08-10
Payer: COMMERCIAL

## 2018-08-10 ENCOUNTER — OFFICE VISIT (OUTPATIENT)
Dept: INTERNAL MEDICINE CLINIC | Age: 43
End: 2018-08-10

## 2018-08-10 ENCOUNTER — HOSPITAL ENCOUNTER (OUTPATIENT)
Age: 43
Discharge: HOME | End: 2018-08-10
Payer: COMMERCIAL

## 2018-08-10 VITALS
DIASTOLIC BLOOD PRESSURE: 75 MMHG | SYSTOLIC BLOOD PRESSURE: 90 MMHG | HEIGHT: 72 IN | BODY MASS INDEX: 34.95 KG/M2 | HEART RATE: 60 BPM | WEIGHT: 258 LBS | RESPIRATION RATE: 18 BRPM

## 2018-08-10 DIAGNOSIS — R06.09 DYSPNEA ON EXERTION: ICD-10-CM

## 2018-08-10 DIAGNOSIS — R06.09 DYSPNEA ON EXERTION: Primary | ICD-10-CM

## 2018-08-10 DIAGNOSIS — J18.9 PNEUMONIA OF LEFT LUNG DUE TO INFECTIOUS ORGANISM, UNSPECIFIED PART OF LUNG: Primary | ICD-10-CM

## 2018-08-10 DIAGNOSIS — I50.43 ACUTE ON CHRONIC COMBINED SYSTOLIC AND DIASTOLIC CONGESTIVE HEART FAILURE (HCC): ICD-10-CM

## 2018-08-10 PROCEDURE — 71046 X-RAY EXAM CHEST 2 VIEWS: CPT

## 2018-08-10 PROCEDURE — 99213 OFFICE O/P EST LOW 20 MIN: CPT | Performed by: INTERNAL MEDICINE

## 2018-08-10 RX ORDER — LEVOFLOXACIN 500 MG/1
500 TABLET, FILM COATED ORAL DAILY
Qty: 7 TABLET | Refills: 0 | Status: SHIPPED | OUTPATIENT
Start: 2018-08-10 | End: 2018-08-17

## 2018-08-10 NOTE — PROGRESS NOTES
2 chronically uncontrolled with symptoms and complications  - peripheral neuropathy, ED , skin ulcers  - should monitor better and take insulin regularly  - continue toujeo 12 at night, resume oral meds   Need eye exam     - continue statins, ARB     F/w 3 months

## 2018-09-17 RX ORDER — TIZANIDINE 4 MG/1
4 TABLET ORAL NIGHTLY PRN
Qty: 30 TABLET | Refills: 2 | Status: SHIPPED | OUTPATIENT
Start: 2018-09-17 | End: 2018-12-17 | Stop reason: SDUPTHER

## 2018-09-22 DIAGNOSIS — E11.42 DIABETIC PERIPHERAL NEUROPATHY ASSOCIATED WITH TYPE 2 DIABETES MELLITUS (HCC): ICD-10-CM

## 2018-09-24 RX ORDER — GABAPENTIN 300 MG/1
600 CAPSULE ORAL 3 TIMES DAILY
Qty: 180 CAPSULE | Refills: 0 | Status: SHIPPED | OUTPATIENT
Start: 2018-09-24 | End: 2018-10-24 | Stop reason: SDUPTHER

## 2018-10-16 ENCOUNTER — TELEPHONE (OUTPATIENT)
Dept: INTERNAL MEDICINE CLINIC | Age: 43
End: 2018-10-16

## 2018-10-24 DIAGNOSIS — E11.42 DIABETIC PERIPHERAL NEUROPATHY ASSOCIATED WITH TYPE 2 DIABETES MELLITUS (HCC): ICD-10-CM

## 2018-10-25 RX ORDER — GABAPENTIN 300 MG/1
600 CAPSULE ORAL 3 TIMES DAILY
Qty: 180 CAPSULE | Refills: 0 | Status: SHIPPED | OUTPATIENT
Start: 2018-10-25 | End: 2018-11-21 | Stop reason: SDUPTHER

## 2018-11-21 DIAGNOSIS — E11.42 DIABETIC PERIPHERAL NEUROPATHY ASSOCIATED WITH TYPE 2 DIABETES MELLITUS (HCC): ICD-10-CM

## 2018-11-21 RX ORDER — GABAPENTIN 300 MG/1
600 CAPSULE ORAL 3 TIMES DAILY
Qty: 180 CAPSULE | Refills: 0 | Status: SHIPPED | OUTPATIENT
Start: 2018-11-21 | End: 2018-12-21 | Stop reason: SDUPTHER

## 2018-12-17 DIAGNOSIS — E11.42 DIABETIC PERIPHERAL NEUROPATHY ASSOCIATED WITH TYPE 2 DIABETES MELLITUS (HCC): ICD-10-CM

## 2018-12-17 RX ORDER — FUROSEMIDE 20 MG/1
20 TABLET ORAL DAILY
Qty: 30 TABLET | Refills: 0 | Status: SHIPPED | OUTPATIENT
Start: 2018-12-17 | End: 2018-12-21 | Stop reason: SDUPTHER

## 2018-12-17 RX ORDER — GABAPENTIN 300 MG/1
CAPSULE ORAL
Qty: 180 CAPSULE | Refills: 0 | OUTPATIENT
Start: 2018-12-17

## 2018-12-17 RX ORDER — TIZANIDINE 4 MG/1
4 TABLET ORAL NIGHTLY PRN
Qty: 30 TABLET | Refills: 0 | Status: SHIPPED | OUTPATIENT
Start: 2018-12-17 | End: 2019-01-15 | Stop reason: SDUPTHER

## 2018-12-17 RX ORDER — CARVEDILOL 12.5 MG/1
12.5 TABLET ORAL 2 TIMES DAILY WITH MEALS
Qty: 180 TABLET | Refills: 0 | Status: SHIPPED | OUTPATIENT
Start: 2018-12-17 | End: 2018-12-21 | Stop reason: SDUPTHER

## 2018-12-21 ENCOUNTER — OFFICE VISIT (OUTPATIENT)
Dept: INTERNAL MEDICINE CLINIC | Age: 43
End: 2018-12-21

## 2018-12-21 VITALS
HEIGHT: 72 IN | RESPIRATION RATE: 18 BRPM | DIASTOLIC BLOOD PRESSURE: 89 MMHG | BODY MASS INDEX: 36.03 KG/M2 | WEIGHT: 266 LBS | SYSTOLIC BLOOD PRESSURE: 125 MMHG | HEART RATE: 98 BPM

## 2018-12-21 DIAGNOSIS — I42.0 DILATED CARDIOMYOPATHY (HCC): ICD-10-CM

## 2018-12-21 DIAGNOSIS — I10 ESSENTIAL HYPERTENSION: ICD-10-CM

## 2018-12-21 DIAGNOSIS — E78.2 MIXED HYPERLIPIDEMIA: ICD-10-CM

## 2018-12-21 DIAGNOSIS — E11.42 TYPE 2 DIABETES MELLITUS WITH DIABETIC POLYNEUROPATHY, WITH LONG-TERM CURRENT USE OF INSULIN (HCC): ICD-10-CM

## 2018-12-21 DIAGNOSIS — Z79.4 TYPE 2 DIABETES MELLITUS WITH DIABETIC POLYNEUROPATHY, WITH LONG-TERM CURRENT USE OF INSULIN (HCC): ICD-10-CM

## 2018-12-21 DIAGNOSIS — E11.42 DIABETIC PERIPHERAL NEUROPATHY ASSOCIATED WITH TYPE 2 DIABETES MELLITUS (HCC): Primary | ICD-10-CM

## 2018-12-21 DIAGNOSIS — M54.6 CHRONIC MIDLINE THORACIC BACK PAIN: ICD-10-CM

## 2018-12-21 DIAGNOSIS — F41.9 ANXIETY: ICD-10-CM

## 2018-12-21 DIAGNOSIS — G89.29 CHRONIC MIDLINE THORACIC BACK PAIN: ICD-10-CM

## 2018-12-21 LAB
A/G RATIO: 1.7 (ref 1.1–2.2)
ALBUMIN SERPL-MCNC: 4.7 G/DL (ref 3.4–5)
ALP BLD-CCNC: 107 U/L (ref 40–129)
ALT SERPL-CCNC: 29 U/L (ref 10–40)
ANION GAP SERPL CALCULATED.3IONS-SCNC: 17 MMOL/L (ref 3–16)
AST SERPL-CCNC: 15 U/L (ref 15–37)
BASOPHILS ABSOLUTE: 0.1 K/UL (ref 0–0.2)
BASOPHILS RELATIVE PERCENT: 1.2 %
BILIRUB SERPL-MCNC: 1.8 MG/DL (ref 0–1)
BUN BLDV-MCNC: 18 MG/DL (ref 7–20)
CALCIUM SERPL-MCNC: 9.7 MG/DL (ref 8.3–10.6)
CHLORIDE BLD-SCNC: 95 MMOL/L (ref 99–110)
CHOLESTEROL, TOTAL: 296 MG/DL (ref 0–199)
CO2: 24 MMOL/L (ref 21–32)
CREAT SERPL-MCNC: 0.7 MG/DL (ref 0.9–1.3)
EOSINOPHILS ABSOLUTE: 0.2 K/UL (ref 0–0.6)
EOSINOPHILS RELATIVE PERCENT: 1.5 %
GFR AFRICAN AMERICAN: >60
GFR NON-AFRICAN AMERICAN: >60
GLOBULIN: 2.7 G/DL
GLUCOSE BLD-MCNC: 381 MG/DL (ref 70–99)
HCT VFR BLD CALC: 50.1 % (ref 40.5–52.5)
HDLC SERPL-MCNC: 26 MG/DL (ref 40–60)
HEMOGLOBIN: 17.4 G/DL (ref 13.5–17.5)
LDL CHOLESTEROL CALCULATED: ABNORMAL MG/DL
LYMPHOCYTES ABSOLUTE: 2.4 K/UL (ref 1–5.1)
LYMPHOCYTES RELATIVE PERCENT: 22.3 %
MCH RBC QN AUTO: 33.1 PG (ref 26–34)
MCHC RBC AUTO-ENTMCNC: 34.8 G/DL (ref 31–36)
MCV RBC AUTO: 95.3 FL (ref 80–100)
MONOCYTES ABSOLUTE: 0.8 K/UL (ref 0–1.3)
MONOCYTES RELATIVE PERCENT: 7.8 %
NEUTROPHILS ABSOLUTE: 7.3 K/UL (ref 1.7–7.7)
NEUTROPHILS RELATIVE PERCENT: 67.2 %
PDW BLD-RTO: 12.8 % (ref 12.4–15.4)
PLATELET # BLD: 223 K/UL (ref 135–450)
PMV BLD AUTO: 9.3 FL (ref 5–10.5)
POTASSIUM SERPL-SCNC: 4.6 MMOL/L (ref 3.5–5.1)
RBC # BLD: 5.26 M/UL (ref 4.2–5.9)
SODIUM BLD-SCNC: 136 MMOL/L (ref 136–145)
TOTAL PROTEIN: 7.4 G/DL (ref 6.4–8.2)
TRIGL SERPL-MCNC: 1289 MG/DL (ref 0–150)
VLDLC SERPL CALC-MCNC: ABNORMAL MG/DL
WBC # BLD: 10.9 K/UL (ref 4–11)

## 2018-12-21 PROCEDURE — 81002 URINALYSIS NONAUTO W/O SCOPE: CPT | Performed by: INTERNAL MEDICINE

## 2018-12-21 PROCEDURE — 99213 OFFICE O/P EST LOW 20 MIN: CPT | Performed by: INTERNAL MEDICINE

## 2018-12-21 RX ORDER — GABAPENTIN 300 MG/1
600 CAPSULE ORAL 3 TIMES DAILY
Qty: 180 CAPSULE | Refills: 0 | Status: SHIPPED | OUTPATIENT
Start: 2018-12-21 | End: 2019-01-22 | Stop reason: SDUPTHER

## 2018-12-21 RX ORDER — SIMVASTATIN 20 MG
TABLET ORAL
Qty: 30 TABLET | Refills: 2 | Status: SHIPPED | OUTPATIENT
Start: 2018-12-21 | End: 2019-05-02 | Stop reason: SDUPTHER

## 2018-12-21 RX ORDER — LOSARTAN POTASSIUM 50 MG/1
50 TABLET ORAL DAILY
Qty: 30 TABLET | Refills: 2 | Status: SHIPPED | OUTPATIENT
Start: 2018-12-21 | End: 2019-05-02 | Stop reason: SDUPTHER

## 2018-12-21 RX ORDER — PAROXETINE 10 MG/1
10 TABLET, FILM COATED ORAL EVERY MORNING
Qty: 30 TABLET | Refills: 2 | Status: SHIPPED | OUTPATIENT
Start: 2018-12-21 | End: 2019-05-02 | Stop reason: SDUPTHER

## 2018-12-21 RX ORDER — FUROSEMIDE 20 MG/1
20 TABLET ORAL DAILY
Qty: 30 TABLET | Refills: 2 | Status: SHIPPED | OUTPATIENT
Start: 2018-12-21 | End: 2019-05-02 | Stop reason: SDUPTHER

## 2018-12-21 RX ORDER — GLIMEPIRIDE 4 MG/1
4 TABLET ORAL
Qty: 30 TABLET | Refills: 2 | Status: SHIPPED | OUTPATIENT
Start: 2018-12-21 | End: 2019-05-02 | Stop reason: SDUPTHER

## 2018-12-21 RX ORDER — CARVEDILOL 12.5 MG/1
12.5 TABLET ORAL 2 TIMES DAILY WITH MEALS
Qty: 60 TABLET | Refills: 2 | Status: SHIPPED | OUTPATIENT
Start: 2018-12-21 | End: 2019-05-02 | Stop reason: SDUPTHER

## 2018-12-21 ASSESSMENT — PATIENT HEALTH QUESTIONNAIRE - PHQ9
1. LITTLE INTEREST OR PLEASURE IN DOING THINGS: 0
2. FEELING DOWN, DEPRESSED OR HOPELESS: 1
SUM OF ALL RESPONSES TO PHQ QUESTIONS 1-9: 1
SUM OF ALL RESPONSES TO PHQ QUESTIONS 1-9: 1
SUM OF ALL RESPONSES TO PHQ9 QUESTIONS 1 & 2: 1

## 2018-12-22 LAB
ESTIMATED AVERAGE GLUCOSE: 197.3 MG/DL
HBA1C MFR BLD: 8.5 %
LDL CHOLESTEROL DIRECT: 45 MG/DL

## 2018-12-24 ENCOUNTER — TELEPHONE (OUTPATIENT)
Dept: INTERNAL MEDICINE CLINIC | Age: 43
End: 2018-12-24

## 2018-12-24 RX ORDER — OMEGA-3-ACID ETHYL ESTERS 1 G/1
1 CAPSULE, LIQUID FILLED ORAL 2 TIMES DAILY
Qty: 60 CAPSULE | Refills: 2 | Status: SHIPPED | OUTPATIENT
Start: 2018-12-24 | End: 2018-12-27

## 2018-12-24 NOTE — TELEPHONE ENCOUNTER
----- Message from Travis Barton MD sent at 12/23/2018 10:19 AM EST -----  Sugars same as last time, resume long acting insulin   Continue meds  Cholesterol very high - continue zocor, add lovaza 1 cap bid   Cbc, renal liver ok

## 2018-12-27 ENCOUNTER — TELEPHONE (OUTPATIENT)
Dept: INTERNAL MEDICINE CLINIC | Age: 43
End: 2018-12-27

## 2018-12-27 RX ORDER — GEMFIBROZIL 600 MG/1
600 TABLET, FILM COATED ORAL
Qty: 60 TABLET | Refills: 3 | Status: SHIPPED | OUTPATIENT
Start: 2018-12-27 | End: 2019-05-02 | Stop reason: SDUPTHER

## 2019-01-15 RX ORDER — TIZANIDINE 4 MG/1
4 TABLET ORAL NIGHTLY PRN
Qty: 30 TABLET | Refills: 0 | Status: SHIPPED | OUTPATIENT
Start: 2019-01-15 | End: 2019-02-11 | Stop reason: SDUPTHER

## 2019-01-22 DIAGNOSIS — E11.42 DIABETIC PERIPHERAL NEUROPATHY ASSOCIATED WITH TYPE 2 DIABETES MELLITUS (HCC): ICD-10-CM

## 2019-01-22 RX ORDER — GABAPENTIN 300 MG/1
600 CAPSULE ORAL 3 TIMES DAILY
Qty: 180 CAPSULE | Refills: 0 | Status: SHIPPED | OUTPATIENT
Start: 2019-01-22 | End: 2019-02-22 | Stop reason: SDUPTHER

## 2019-02-11 RX ORDER — TIZANIDINE 4 MG/1
4 TABLET ORAL NIGHTLY PRN
Qty: 30 TABLET | Refills: 0 | Status: SHIPPED | OUTPATIENT
Start: 2019-02-11 | End: 2019-03-11 | Stop reason: SDUPTHER

## 2019-02-22 DIAGNOSIS — E11.42 DIABETIC PERIPHERAL NEUROPATHY ASSOCIATED WITH TYPE 2 DIABETES MELLITUS (HCC): ICD-10-CM

## 2019-02-22 RX ORDER — GABAPENTIN 300 MG/1
600 CAPSULE ORAL 3 TIMES DAILY
Qty: 180 CAPSULE | Refills: 0 | Status: SHIPPED | OUTPATIENT
Start: 2019-02-22 | End: 2019-03-22 | Stop reason: SDUPTHER

## 2019-03-11 RX ORDER — TIZANIDINE 4 MG/1
4 TABLET ORAL NIGHTLY PRN
Qty: 30 TABLET | Refills: 1 | Status: SHIPPED | OUTPATIENT
Start: 2019-03-11 | End: 2019-04-09 | Stop reason: SDUPTHER

## 2019-03-22 DIAGNOSIS — E11.42 DIABETIC PERIPHERAL NEUROPATHY ASSOCIATED WITH TYPE 2 DIABETES MELLITUS (HCC): ICD-10-CM

## 2019-03-22 RX ORDER — GABAPENTIN 300 MG/1
600 CAPSULE ORAL 3 TIMES DAILY
Qty: 180 CAPSULE | Refills: 0 | Status: SHIPPED | OUTPATIENT
Start: 2019-03-24 | End: 2019-04-22 | Stop reason: SDUPTHER

## 2019-04-09 RX ORDER — TIZANIDINE 4 MG/1
4 TABLET ORAL NIGHTLY PRN
Qty: 30 TABLET | Refills: 0 | Status: SHIPPED | OUTPATIENT
Start: 2019-04-09 | End: 2019-06-07 | Stop reason: SDUPTHER

## 2019-04-22 DIAGNOSIS — E11.42 DIABETIC PERIPHERAL NEUROPATHY ASSOCIATED WITH TYPE 2 DIABETES MELLITUS (HCC): ICD-10-CM

## 2019-04-22 RX ORDER — GABAPENTIN 300 MG/1
600 CAPSULE ORAL 3 TIMES DAILY
Qty: 180 CAPSULE | Refills: 0 | Status: SHIPPED | OUTPATIENT
Start: 2019-04-22 | End: 2019-05-22 | Stop reason: SDUPTHER

## 2019-05-02 ENCOUNTER — OFFICE VISIT (OUTPATIENT)
Dept: INTERNAL MEDICINE CLINIC | Age: 44
End: 2019-05-02

## 2019-05-02 VITALS
WEIGHT: 270 LBS | HEART RATE: 60 BPM | BODY MASS INDEX: 36.57 KG/M2 | DIASTOLIC BLOOD PRESSURE: 95 MMHG | HEIGHT: 72 IN | RESPIRATION RATE: 18 BRPM | SYSTOLIC BLOOD PRESSURE: 135 MMHG

## 2019-05-02 DIAGNOSIS — Z79.4 TYPE 2 DIABETES MELLITUS WITH DIABETIC POLYNEUROPATHY, WITH LONG-TERM CURRENT USE OF INSULIN (HCC): ICD-10-CM

## 2019-05-02 DIAGNOSIS — E11.42 DIABETIC PERIPHERAL NEUROPATHY ASSOCIATED WITH TYPE 2 DIABETES MELLITUS (HCC): ICD-10-CM

## 2019-05-02 DIAGNOSIS — I42.0 DILATED CARDIOMYOPATHY (HCC): ICD-10-CM

## 2019-05-02 DIAGNOSIS — I50.42 CHRONIC COMBINED SYSTOLIC AND DIASTOLIC CONGESTIVE HEART FAILURE (HCC): ICD-10-CM

## 2019-05-02 DIAGNOSIS — E11.42 TYPE 2 DIABETES MELLITUS WITH DIABETIC POLYNEUROPATHY, WITH LONG-TERM CURRENT USE OF INSULIN (HCC): ICD-10-CM

## 2019-05-02 DIAGNOSIS — E78.2 MIXED HYPERLIPIDEMIA: ICD-10-CM

## 2019-05-02 DIAGNOSIS — F41.9 ANXIETY: ICD-10-CM

## 2019-05-02 DIAGNOSIS — M54.42 CHRONIC MIDLINE LOW BACK PAIN WITH BILATERAL SCIATICA: ICD-10-CM

## 2019-05-02 DIAGNOSIS — G89.29 CHRONIC MIDLINE LOW BACK PAIN WITH BILATERAL SCIATICA: ICD-10-CM

## 2019-05-02 DIAGNOSIS — I10 ESSENTIAL HYPERTENSION: Primary | ICD-10-CM

## 2019-05-02 DIAGNOSIS — M54.41 CHRONIC MIDLINE LOW BACK PAIN WITH BILATERAL SCIATICA: ICD-10-CM

## 2019-05-02 LAB
A/G RATIO: 1.6 (ref 1.1–2.2)
ALBUMIN SERPL-MCNC: 4.1 G/DL (ref 3.4–5)
ALP BLD-CCNC: 122 U/L (ref 40–129)
ALT SERPL-CCNC: <5 U/L (ref 10–40)
ANION GAP SERPL CALCULATED.3IONS-SCNC: 18 MMOL/L (ref 3–16)
AST SERPL-CCNC: <5 U/L (ref 15–37)
BILIRUB SERPL-MCNC: 1.5 MG/DL (ref 0–1)
BILIRUBIN, POC: NORMAL
BLOOD URINE, POC: NORMAL
BUN BLDV-MCNC: 17 MG/DL (ref 7–20)
CALCIUM SERPL-MCNC: 9.2 MG/DL (ref 8.3–10.6)
CHLORIDE BLD-SCNC: 89 MMOL/L (ref 99–110)
CLARITY, POC: NORMAL
CO2: 22 MMOL/L (ref 21–32)
COLOR, POC: NORMAL
CREAT SERPL-MCNC: 0.9 MG/DL (ref 0.9–1.3)
CREATININE URINE: 36.3 MG/DL (ref 39–259)
GFR AFRICAN AMERICAN: >60
GFR NON-AFRICAN AMERICAN: >60
GLOBULIN: 2.5 G/DL
GLUCOSE BLD-MCNC: 591 MG/DL (ref 70–99)
GLUCOSE URINE, POC: NORMAL
KETONES, POC: NORMAL
LEUKOCYTE EST, POC: NORMAL
MICROALBUMIN UR-MCNC: <1.2 MG/DL
MICROALBUMIN/CREAT UR-RTO: ABNORMAL MG/G (ref 0–30)
NITRITE, POC: NORMAL
PH, POC: NORMAL
POTASSIUM SERPL-SCNC: 4.5 MMOL/L (ref 3.5–5.1)
PROTEIN, POC: NORMAL
SODIUM BLD-SCNC: 129 MMOL/L (ref 136–145)
SPECIFIC GRAVITY, POC: NORMAL
TOTAL PROTEIN: 6.6 G/DL (ref 6.4–8.2)
UROBILINOGEN, POC: NORMAL

## 2019-05-02 PROCEDURE — 81002 URINALYSIS NONAUTO W/O SCOPE: CPT | Performed by: INTERNAL MEDICINE

## 2019-05-02 PROCEDURE — 99214 OFFICE O/P EST MOD 30 MIN: CPT | Performed by: INTERNAL MEDICINE

## 2019-05-02 RX ORDER — CARVEDILOL 12.5 MG/1
12.5 TABLET ORAL 2 TIMES DAILY WITH MEALS
Qty: 60 TABLET | Refills: 2 | Status: SHIPPED | OUTPATIENT
Start: 2019-05-02 | End: 2019-10-02 | Stop reason: SDUPTHER

## 2019-05-02 RX ORDER — GEMFIBROZIL 600 MG/1
600 TABLET, FILM COATED ORAL
Qty: 60 TABLET | Refills: 3 | Status: SHIPPED | OUTPATIENT
Start: 2019-05-02 | End: 2019-09-05

## 2019-05-02 RX ORDER — PAROXETINE 10 MG/1
10 TABLET, FILM COATED ORAL EVERY MORNING
Qty: 30 TABLET | Refills: 2 | Status: SHIPPED | OUTPATIENT
Start: 2019-05-02 | End: 2019-08-01

## 2019-05-02 RX ORDER — SIMVASTATIN 20 MG
TABLET ORAL
Qty: 30 TABLET | Refills: 2 | Status: SHIPPED | OUTPATIENT
Start: 2019-05-02 | End: 2019-08-22 | Stop reason: SDUPTHER

## 2019-05-02 RX ORDER — FUROSEMIDE 20 MG/1
20 TABLET ORAL DAILY
Qty: 30 TABLET | Refills: 2 | Status: SHIPPED | OUTPATIENT
Start: 2019-05-02 | End: 2019-08-08 | Stop reason: SDUPTHER

## 2019-05-02 RX ORDER — LOSARTAN POTASSIUM 50 MG/1
50 TABLET ORAL DAILY
Qty: 30 TABLET | Refills: 2 | Status: SHIPPED | OUTPATIENT
Start: 2019-05-02 | End: 2019-06-29 | Stop reason: SDUPTHER

## 2019-05-02 RX ORDER — SILDENAFIL 100 MG/1
100 TABLET, FILM COATED ORAL PRN
Qty: 15 TABLET | Refills: 0 | Status: SHIPPED | OUTPATIENT
Start: 2019-05-02 | End: 2019-08-27 | Stop reason: SDUPTHER

## 2019-05-02 RX ORDER — GLIMEPIRIDE 4 MG/1
4 TABLET ORAL
Qty: 30 TABLET | Refills: 2 | Status: ON HOLD | OUTPATIENT
Start: 2019-05-02 | End: 2019-08-26 | Stop reason: HOSPADM

## 2019-05-02 NOTE — PATIENT INSTRUCTIONS
Patient Self-Management Goal for Health Maintenance  Goal: I will schedule a yearly preventative care visit.   Barriers: none  Plan for overcoming my barriers: N/A  Confidence: 10/10  Anticipated Goal Completion Date: 08/02/19

## 2019-05-02 NOTE — PROGRESS NOTES
Subjective:      Patient ID:    HPI     41 y.o. Male with known h.o DM-2, HTN, cardiomopathy, CHF, neuropathy here for regular fw. DM- 2 IDDM with complications  Has been trying to be  compliant with meds regarding DM, HTN ,  On amaryl and lantus  Not taking long acting regularly as scared of low sugars  Sugars range from 200-400 intermittently. No low sugars        Has h.o non ischemic cardiomyopathy . EF noted low at 20-25 % (2014)and treated with diuresis , b blockers, digoxin  , improved EF on f.w ECHo  Able to work  Normally with no residual dyspnea or pedal edema. Back to work  No chest pain or nocturnal cough-   Currently on coreg onlyl. Off digoxin. Aldactone and lasix   Reports mild edema to both legs , no worse than usual          Has severe Neuropathy in extremities with symptoms of tingling and numbness in hands . Palms are hypersensitive. Now on neurontin tid, off tramadol and pain meds. Unable to afford lyrica. Anxiety improved on paxil     HTN - recently office readings remain stable with meds   Being compliant    Chronic lower ext wounds, no infection per pt     Reports low back pain radiating to both lower ext, chronic, worse with acitivity and work , resolves with rest  Previously seen pain mx with no benefit        Current Outpatient Medications   Medication Sig Dispense Refill    gabapentin (NEURONTIN) 300 MG capsule Take 2 capsules by mouth 3 times daily for 30 days.  180 capsule 0    tiZANidine (ZANAFLEX) 4 MG tablet TAKE 1 TABLET BY MOUTH NIGHTLY AS NEEDED (MUSCLE SPASMS) 30 tablet 0    gemfibrozil (LOPID) 600 MG tablet Take 1 tablet by mouth 2 times daily (before meals) 60 tablet 3    insulin glargine (TOUJEO SOLOSTAR) 300 UNIT/ML injection pen Inject 15 Units into the skin nightly 5 pen 3    insulin lispro (HUMALOG KWIKPEN) 100 UNIT/ML pen Inject 5 Units into the skin 3 times daily (before meals) 5 pen 3    furosemide (LASIX) 20 MG tablet Take 1 tablet by mouth daily 30 tablet 2    carvedilol (COREG) 12.5 MG tablet Take 1 tablet by mouth 2 times daily (with meals) 60 tablet 2    losartan (COZAAR) 50 MG tablet Take 1 tablet by mouth daily 30 tablet 2    glimepiride (AMARYL) 4 MG tablet Take 1 tablet by mouth every morning (before breakfast) 30 tablet 2    simvastatin (ZOCOR) 20 MG tablet TAKE ONE TABLET BY MOUTH ONCE NIGHTLY 30 tablet 2    PARoxetine (PAXIL) 10 MG tablet Take 1 tablet by mouth every morning 30 tablet 2    glucose blood VI test strips (ASCENSIA AUTODISC VI;ONE TOUCH ULTRA TEST VI) strip Test threes time a day. DX: E11.9 100 each 11    Lancets MISC Test three times a day with Contour Next EZ machine. DX: E11.9 100 each 11    Insulin Syringe-Needle U-100 (iSuppli INS SYR .3CC/29GX0.5\") 29G X 1/2\" 0.3 ML MISC 1 each by Does not apply route daily. 100 each 3    glucose blood VI test strips (EXACTECH TEST) strip 1 each by In Vitro route daily. As needed. 100 each 0     No current facility-administered medications for this visit. Review of Systems  As above    There are no changes to past medical history, family history, social history or review of systems(except as noted in the history section) since prior note (all reviewed with patient). Objective:   Physical Exam    Vitals:    05/02/19 0914   BP: (!) 135/95   Pulse: 60   Resp: 18         General:  Young male, Awake, alert and oriented. Appears to be not in any distress  Mucous Membranes:  Pink , anicteric  Neck: No JVD, no carotid bruit, no thyromegaly  Chest:  Clear to auscultation bilaterally, no added sounds  Cardiovascular:  RRR S1S2 heard, no murmurs or gallops tachycardic  Abdomen:  Soft, undistended, non tender, no organomegaly, BS present  Extremities:   Trace edema  Distal pulses well felt  Neurological : grossly normal  Several healing  superficial open ulcers on both shins from half way through the legs to ankle. No bleeding.  No cellulitis but has erythema       ASSESMENT & PLAN: Diagnosis Orders   1. Essential hypertension     2. Type 2 diabetes mellitus with diabetic polyneuropathy, with long-term current use of insulin ([facility])  COMPREHENSIVE METABOLIC PANEL    HEMOGLOBIN A1C   3. Chronic combined systolic and diastolic congestive heart failure ([facility] [address]     4. Dilated cardiomyopathy ([facility] [address]     5. Diabetic peripheral neuropathy associated with type 2 diabetes mellitus ([facility] [address]     6. Mixed hyperlipidemia     7. Chronic midline low back pain with bilateral sciatica           Wt Readings from Last 3 Encounters:   05/02/19 270 lb (122.5 kg)   12/21/18 266 lb (120.7 kg)   08/10/18 258 lb (117 kg)         Cardiomyopathy - non ischemic   - no symptoms, on coreg, losartan, lasix  - is off aldactone and digoxin  - need to be complaint with diet   - start exercise     DM- 2 chronically uncontrolled with symptoms and complications  - peripheral neuropathy, ED , skin ulcers  - should monitor better and take insulin regularly  - continue amaryl   - continue toujeo 15 at night  Need eye exam this year  - referred to Endocrine but pt did not make appt  - add jardiance if insurance covers  - continue statins, ARB      HTN - recently improved with being compliant  -coreg, losartan     Hyperlipidemia - severely elevated TGL.   on statins, and lopid again this time,    Peripheral neuropathy - from uncontrolled DM  - on gabapentin, off pain mx      Low back pain - chronic and worsening - obtain MRI    Chronic diabetic ulcers  - no active cellulitis but has several open ulcers  - refer to wound care  again    ED - prn viagra     Depression - stable on paxil      F/w 3 months

## 2019-05-02 NOTE — PROGRESS NOTES
Chronic Disease Visit Information    BP Readings from Last 3 Encounters:   12/21/18 125/89   08/10/18 90/75   07/23/18 (!) 140/75          Hemoglobin A1C (%)   Date Value   12/21/2018 8.5   07/23/2018 8.9   02/27/2018 10.0     Microalbumin, Random Urine (mg/dL)   Date Value   11/09/2017 <1.20     LDL Calculated (mg/dL)   Date Value   12/21/2018 see below     HDL (mg/dL)   Date Value   12/21/2018 26 (L)     BUN (mg/dL)   Date Value   12/21/2018 18     CREATININE (mg/dL)   Date Value   12/21/2018 0.7 (L)     Glucose (mg/dL)   Date Value   12/21/2018 381 (H)            Have you changed or started any medications since your last visit including any over-the-counter medicines, vitamins, or herbal medicines? no   Are you having any side effects from any of your medications? -  no  Have you stopped taking any of your medications? Is so, why? -  no    Have you seen any other physician or provider since your last visit? No  Have you had any other diagnostic tests since your last visit? No  Have you been seen in the emergency room and/or had an admission to a hospital since we last saw you? No  Have you had your annual diabetic retinal (eye) exam? No  Have you had your routine dental cleaning in the past 6 months? no    Have you activated your [employer] account? If not, what are your barriers?  No:      Patient Care Team:  Anai Michel MD as PCP - General (Internal Medicine)         Medical History Review  Past Medical, Family, and Social History reviewed and does not contribute to the patient presenting condition    Health Maintenance   Topic Date Due    Pneumococcal 0-64 years Vaccine (1 of 1 - PPSV23) 10/04/1983    Diabetic retinal exam  10/04/1987    HIV screen  10/04/1992    Hepatitis B Vaccine (1 of 3 - Risk 3-dose series) 10/04/1996    DTaP/Tdap/Td vaccine (1 - Tdap) 10/04/1996    Diabetic foot exam  04/19/2017    Diabetic microalbuminuria test  11/09/2018    Flu vaccine (Season Ended) 09/01/2019    A1C test (Diabetic or Prediabetic)  12/21/2019    Lipid screen  12/21/2019    Potassium monitoring  12/21/2019    Creatinine monitoring  12/21/2019

## 2019-05-03 ENCOUNTER — TELEPHONE (OUTPATIENT)
Dept: INTERNAL MEDICINE CLINIC | Age: 44
End: 2019-05-03

## 2019-05-03 LAB
ESTIMATED AVERAGE GLUCOSE: 269 MG/DL
HBA1C MFR BLD: 11 %

## 2019-05-03 NOTE — TELEPHONE ENCOUNTER
----- Message from Danny Borden sent at 5/3/2019  9:13 AM EDT -----  Contact: [facility] from [address], Meng Weis from [address], [phone], called and is requesting for clinicals be faxed to them for pt's MRI. Fax: [phone].

## 2019-05-14 ENCOUNTER — TELEPHONE (OUTPATIENT)
Dept: INTERNAL MEDICINE CLINIC | Age: 44
End: 2019-05-14

## 2019-05-14 RX ORDER — AZITHROMYCIN 250 MG/1
TABLET, FILM COATED ORAL
Qty: 1 PACKET | Refills: 0 | Status: SHIPPED | OUTPATIENT
Start: 2019-05-14 | End: 2019-08-01

## 2019-05-14 NOTE — TELEPHONE ENCOUNTER
----- Message from Nilda Hoang MD sent at 5/14/2019  5:10 PM EDT -----  Contact: pt- [phone]  Start on z pack if no allergies  Also try mucinex   See us if not better    ----- Message -----  From: Danielle Hansa Luis  Sent: 5/14/2019   3:27 PM  To: Nilda Hoang MD    He has been sick for about 4 days now- -stuffy head-eye pressure -no fever - wanted to know what you recommend - [facility] at [phone]-last appt- 5-2-19-next appt- 9-11-19-lr

## 2019-05-22 DIAGNOSIS — E11.42 DIABETIC PERIPHERAL NEUROPATHY ASSOCIATED WITH TYPE 2 DIABETES MELLITUS (HCC): ICD-10-CM

## 2019-05-22 RX ORDER — GABAPENTIN 300 MG/1
600 CAPSULE ORAL 3 TIMES DAILY
Qty: 180 CAPSULE | Refills: 0 | Status: SHIPPED | OUTPATIENT
Start: 2019-05-22 | End: 2019-06-19 | Stop reason: SDUPTHER

## 2019-06-07 RX ORDER — TIZANIDINE 4 MG/1
4 TABLET ORAL NIGHTLY PRN
Qty: 30 TABLET | Refills: 2 | Status: SHIPPED | OUTPATIENT
Start: 2019-06-07 | End: 2019-09-03 | Stop reason: SDUPTHER

## 2019-06-15 DIAGNOSIS — F41.9 ANXIETY: ICD-10-CM

## 2019-06-18 RX ORDER — PAROXETINE 10 MG/1
10 TABLET, FILM COATED ORAL EVERY MORNING
Qty: 30 TABLET | Refills: 2 | Status: SHIPPED | OUTPATIENT
Start: 2019-06-18 | End: 2019-09-14 | Stop reason: SDUPTHER

## 2019-06-19 DIAGNOSIS — E11.42 DIABETIC PERIPHERAL NEUROPATHY ASSOCIATED WITH TYPE 2 DIABETES MELLITUS (HCC): ICD-10-CM

## 2019-06-20 RX ORDER — GABAPENTIN 300 MG/1
600 CAPSULE ORAL 3 TIMES DAILY
Qty: 180 CAPSULE | Refills: 0 | Status: SHIPPED | OUTPATIENT
Start: 2019-06-22 | End: 2019-07-18 | Stop reason: SDUPTHER

## 2019-07-01 RX ORDER — LOSARTAN POTASSIUM 50 MG/1
50 TABLET ORAL DAILY
Qty: 30 TABLET | Refills: 2 | Status: SHIPPED | OUTPATIENT
Start: 2019-07-01 | End: 2019-10-02 | Stop reason: SDUPTHER

## 2019-07-08 ENCOUNTER — OFFICE VISIT (OUTPATIENT)
Dept: INTERNAL MEDICINE CLINIC | Age: 44
End: 2019-07-08

## 2019-07-08 VITALS
HEART RATE: 100 BPM | HEIGHT: 72 IN | SYSTOLIC BLOOD PRESSURE: 125 MMHG | DIASTOLIC BLOOD PRESSURE: 90 MMHG | WEIGHT: 268 LBS | RESPIRATION RATE: 18 BRPM | BODY MASS INDEX: 36.3 KG/M2

## 2019-07-08 DIAGNOSIS — T30.0 FIRST DEGREE BURN INJURY: Primary | ICD-10-CM

## 2019-07-08 PROCEDURE — 99212 OFFICE O/P EST SF 10 MIN: CPT | Performed by: INTERNAL MEDICINE
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
## 2019-07-18 DIAGNOSIS — E11.42 DIABETIC PERIPHERAL NEUROPATHY ASSOCIATED WITH TYPE 2 DIABETES MELLITUS (HCC): ICD-10-CM

## 2019-07-19 RX ORDER — GABAPENTIN 300 MG/1
600 CAPSULE ORAL 3 TIMES DAILY
Qty: 180 CAPSULE | Refills: 0 | Status: SHIPPED | OUTPATIENT
Start: 2019-07-22 | End: 2019-08-20 | Stop reason: SDUPTHER

## 2019-08-01 ENCOUNTER — APPOINTMENT (OUTPATIENT)
Dept: GENERAL RADIOLOGY | Age: 44
DRG: 982 | End: 2019-08-01
Attending: INTERNAL MEDICINE
Payer: COMMERCIAL

## 2019-08-01 ENCOUNTER — OFFICE VISIT (OUTPATIENT)
Dept: INTERNAL MEDICINE CLINIC | Age: 44
End: 2019-08-01

## 2019-08-01 ENCOUNTER — HOSPITAL ENCOUNTER (INPATIENT)
Age: 44
LOS: 6 days | Discharge: HOME HEALTH | DRG: 982 | End: 2019-08-07
Attending: INTERNAL MEDICINE | Admitting: INTERNAL MEDICINE
Payer: COMMERCIAL

## 2019-08-01 VITALS
SYSTOLIC BLOOD PRESSURE: 145 MMHG | HEIGHT: 72 IN | TEMPERATURE: 99.2 F | HEART RATE: 72 BPM | WEIGHT: 268 LBS | DIASTOLIC BLOOD PRESSURE: 90 MMHG | BODY MASS INDEX: 36.3 KG/M2

## 2019-08-01 DIAGNOSIS — L97.518 DIABETIC ULCER OF TOE OF RIGHT FOOT ASSOCIATED WITH TYPE 2 DIABETES MELLITUS, WITH OTHER ULCER SEVERITY (HCC): Primary | ICD-10-CM

## 2019-08-01 DIAGNOSIS — E11.621 DIABETIC ULCER OF TOE OF RIGHT FOOT ASSOCIATED WITH TYPE 2 DIABETES MELLITUS, WITH OTHER ULCER SEVERITY (HCC): Primary | ICD-10-CM

## 2019-08-01 PROBLEM — L97.509 NEUROPATHIC DIABETIC ULCER OF FOOT (HCC): Status: ACTIVE | Noted: 2019-08-01

## 2019-08-01 PROBLEM — E11.40 NEUROPATHIC DIABETIC ULCER OF FOOT (HCC): Status: ACTIVE | Noted: 2019-08-01

## 2019-08-01 LAB
A/G RATIO: 1 (ref 1.1–2.2)
ALBUMIN SERPL-MCNC: 3.9 G/DL (ref 3.4–5)
ALP BLD-CCNC: 128 U/L (ref 40–129)
ALT SERPL-CCNC: 20 U/L (ref 10–40)
ANION GAP SERPL CALCULATED.3IONS-SCNC: 14 MMOL/L (ref 3–16)
AST SERPL-CCNC: 16 U/L (ref 15–37)
BASOPHILS ABSOLUTE: 0.1 K/UL (ref 0–0.2)
BASOPHILS RELATIVE PERCENT: 0.6 %
BILIRUB SERPL-MCNC: 1.9 MG/DL (ref 0–1)
BUN BLDV-MCNC: 12 MG/DL (ref 7–20)
CALCIUM SERPL-MCNC: 9.1 MG/DL (ref 8.3–10.6)
CHLORIDE BLD-SCNC: 94 MMOL/L (ref 99–110)
CO2: 26 MMOL/L (ref 21–32)
CREAT SERPL-MCNC: 0.7 MG/DL (ref 0.9–1.3)
EKG ATRIAL RATE: 108 BPM
EKG DIAGNOSIS: NORMAL
EKG P AXIS: 30 DEGREES
EKG P-R INTERVAL: 148 MS
EKG Q-T INTERVAL: 362 MS
EKG QRS DURATION: 90 MS
EKG QTC CALCULATION (BAZETT): 485 MS
EKG R AXIS: -27 DEGREES
EKG T AXIS: 39 DEGREES
EKG VENTRICULAR RATE: 108 BPM
EOSINOPHILS ABSOLUTE: 0.2 K/UL (ref 0–0.6)
EOSINOPHILS RELATIVE PERCENT: 1.4 %
GFR AFRICAN AMERICAN: >60
GFR NON-AFRICAN AMERICAN: >60
GLOBULIN: 3.8 G/DL
GLUCOSE BLD-MCNC: 285 MG/DL (ref 70–99)
GLUCOSE BLD-MCNC: 322 MG/DL (ref 70–99)
GLUCOSE BLD-MCNC: 364 MG/DL (ref 70–99)
HCT VFR BLD CALC: 42.1 % (ref 40.5–52.5)
HEMOGLOBIN: 14.7 G/DL (ref 13.5–17.5)
LACTIC ACID: 2 MMOL/L (ref 0.4–2)
LYMPHOCYTES ABSOLUTE: 1.6 K/UL (ref 1–5.1)
LYMPHOCYTES RELATIVE PERCENT: 14.9 %
MCH RBC QN AUTO: 33 PG (ref 26–34)
MCHC RBC AUTO-ENTMCNC: 35 G/DL (ref 31–36)
MCV RBC AUTO: 94.1 FL (ref 80–100)
MONOCYTES ABSOLUTE: 1.2 K/UL (ref 0–1.3)
MONOCYTES RELATIVE PERCENT: 10.8 %
NEUTROPHILS ABSOLUTE: 7.8 K/UL (ref 1.7–7.7)
NEUTROPHILS RELATIVE PERCENT: 72.3 %
PDW BLD-RTO: 12.8 % (ref 12.4–15.4)
PERFORMED ON: ABNORMAL
PERFORMED ON: ABNORMAL
PLATELET # BLD: 245 K/UL (ref 135–450)
PMV BLD AUTO: 7.6 FL (ref 5–10.5)
POTASSIUM REFLEX MAGNESIUM: 4 MMOL/L (ref 3.5–5.1)
RBC # BLD: 4.47 M/UL (ref 4.2–5.9)
SODIUM BLD-SCNC: 134 MMOL/L (ref 136–145)
TOTAL PROTEIN: 7.7 G/DL (ref 6.4–8.2)
WBC # BLD: 10.7 K/UL (ref 4–11)

## 2019-08-01 PROCEDURE — 6360000002 HC RX W HCPCS: Performed by: PHYSICIAN ASSISTANT

## 2019-08-01 PROCEDURE — 6370000000 HC RX 637 (ALT 250 FOR IP): Performed by: PHYSICIAN ASSISTANT

## 2019-08-01 PROCEDURE — 2580000003 HC RX 258: Performed by: PHYSICIAN ASSISTANT

## 2019-08-01 PROCEDURE — 83605 ASSAY OF LACTIC ACID: CPT

## 2019-08-01 PROCEDURE — 1200000000 HC SEMI PRIVATE

## 2019-08-01 PROCEDURE — 87040 BLOOD CULTURE FOR BACTERIA: CPT

## 2019-08-01 PROCEDURE — 80053 COMPREHEN METABOLIC PANEL: CPT

## 2019-08-01 PROCEDURE — 85025 COMPLETE CBC W/AUTO DIFF WBC: CPT

## 2019-08-01 PROCEDURE — 93005 ELECTROCARDIOGRAM TRACING: CPT | Performed by: PHYSICIAN ASSISTANT

## 2019-08-01 PROCEDURE — 94761 N-INVAS EAR/PLS OXIMETRY MLT: CPT

## 2019-08-01 PROCEDURE — 83036 HEMOGLOBIN GLYCOSYLATED A1C: CPT

## 2019-08-01 PROCEDURE — 93010 ELECTROCARDIOGRAM REPORT: CPT | Performed by: INTERNAL MEDICINE

## 2019-08-01 PROCEDURE — 6370000000 HC RX 637 (ALT 250 FOR IP): Performed by: INTERNAL MEDICINE

## 2019-08-01 PROCEDURE — 73660 X-RAY EXAM OF TOE(S): CPT

## 2019-08-01 PROCEDURE — 36415 COLL VENOUS BLD VENIPUNCTURE: CPT

## 2019-08-01 PROCEDURE — 99222 1ST HOSP IP/OBS MODERATE 55: CPT | Performed by: INTERNAL MEDICINE

## 2019-08-01 RX ORDER — ONDANSETRON 2 MG/ML
4 INJECTION INTRAMUSCULAR; INTRAVENOUS EVERY 6 HOURS PRN
Status: DISCONTINUED | OUTPATIENT
Start: 2019-08-01 | End: 2019-08-07 | Stop reason: HOSPADM

## 2019-08-01 RX ORDER — GEMFIBROZIL 600 MG/1
600 TABLET, FILM COATED ORAL
Status: DISCONTINUED | OUTPATIENT
Start: 2019-08-01 | End: 2019-08-07 | Stop reason: HOSPADM

## 2019-08-01 RX ORDER — LOSARTAN POTASSIUM 25 MG/1
50 TABLET ORAL DAILY
Status: DISCONTINUED | OUTPATIENT
Start: 2019-08-01 | End: 2019-08-03

## 2019-08-01 RX ORDER — PAROXETINE HYDROCHLORIDE 20 MG/1
10 TABLET, FILM COATED ORAL EVERY MORNING
Status: DISCONTINUED | OUTPATIENT
Start: 2019-08-02 | End: 2019-08-07 | Stop reason: HOSPADM

## 2019-08-01 RX ORDER — 0.9 % SODIUM CHLORIDE 0.9 %
500 INTRAVENOUS SOLUTION INTRAVENOUS ONCE
Status: COMPLETED | OUTPATIENT
Start: 2019-08-01 | End: 2019-08-01

## 2019-08-01 RX ORDER — DEXTROSE MONOHYDRATE 50 MG/ML
100 INJECTION, SOLUTION INTRAVENOUS PRN
Status: DISCONTINUED | OUTPATIENT
Start: 2019-08-01 | End: 2019-08-07 | Stop reason: HOSPADM

## 2019-08-01 RX ORDER — NICOTINE POLACRILEX 4 MG
15 LOZENGE BUCCAL PRN
Status: DISCONTINUED | OUTPATIENT
Start: 2019-08-01 | End: 2019-08-07 | Stop reason: HOSPADM

## 2019-08-01 RX ORDER — SIMVASTATIN 10 MG
20 TABLET ORAL NIGHTLY
Status: DISCONTINUED | OUTPATIENT
Start: 2019-08-01 | End: 2019-08-07 | Stop reason: HOSPADM

## 2019-08-01 RX ORDER — GABAPENTIN 300 MG/1
600 CAPSULE ORAL 3 TIMES DAILY
Status: DISCONTINUED | OUTPATIENT
Start: 2019-08-01 | End: 2019-08-07 | Stop reason: HOSPADM

## 2019-08-01 RX ORDER — DEXTROSE MONOHYDRATE 25 G/50ML
12.5 INJECTION, SOLUTION INTRAVENOUS PRN
Status: DISCONTINUED | OUTPATIENT
Start: 2019-08-01 | End: 2019-08-07 | Stop reason: HOSPADM

## 2019-08-01 RX ORDER — GLIMEPIRIDE 2 MG/1
4 TABLET ORAL
Status: DISCONTINUED | OUTPATIENT
Start: 2019-08-02 | End: 2019-08-07 | Stop reason: HOSPADM

## 2019-08-01 RX ORDER — SODIUM CHLORIDE 0.9 % (FLUSH) 0.9 %
10 SYRINGE (ML) INJECTION PRN
Status: DISCONTINUED | OUTPATIENT
Start: 2019-08-01 | End: 2019-08-07 | Stop reason: HOSPADM

## 2019-08-01 RX ORDER — FUROSEMIDE 20 MG/1
20 TABLET ORAL DAILY
Status: DISCONTINUED | OUTPATIENT
Start: 2019-08-02 | End: 2019-08-07 | Stop reason: HOSPADM

## 2019-08-01 RX ORDER — CARVEDILOL 6.25 MG/1
12.5 TABLET ORAL 2 TIMES DAILY WITH MEALS
Status: DISCONTINUED | OUTPATIENT
Start: 2019-08-01 | End: 2019-08-07 | Stop reason: HOSPADM

## 2019-08-01 RX ORDER — TIZANIDINE 4 MG/1
4 TABLET ORAL NIGHTLY
Status: DISCONTINUED | OUTPATIENT
Start: 2019-08-01 | End: 2019-08-07 | Stop reason: HOSPADM

## 2019-08-01 RX ORDER — INSULIN GLARGINE 100 [IU]/ML
12 INJECTION, SOLUTION SUBCUTANEOUS NIGHTLY
Status: DISCONTINUED | OUTPATIENT
Start: 2019-08-01 | End: 2019-08-02

## 2019-08-01 RX ORDER — SODIUM CHLORIDE 0.9 % (FLUSH) 0.9 %
10 SYRINGE (ML) INJECTION EVERY 12 HOURS SCHEDULED
Status: DISCONTINUED | OUTPATIENT
Start: 2019-08-01 | End: 2019-08-07 | Stop reason: HOSPADM

## 2019-08-01 RX ORDER — IBUPROFEN 400 MG/1
400 TABLET ORAL EVERY 6 HOURS PRN
Status: DISCONTINUED | OUTPATIENT
Start: 2019-08-01 | End: 2019-08-07 | Stop reason: HOSPADM

## 2019-08-01 RX ADMIN — GABAPENTIN 600 MG: 300 CAPSULE ORAL at 15:15

## 2019-08-01 RX ADMIN — GABAPENTIN 600 MG: 300 CAPSULE ORAL at 20:44

## 2019-08-01 RX ADMIN — PIPERACILLIN AND TAZOBACTAM 3.38 G: 3; .375 INJECTION, POWDER, FOR SOLUTION INTRAVENOUS at 23:08

## 2019-08-01 RX ADMIN — INSULIN LISPRO 3 UNITS: 100 INJECTION, SOLUTION INTRAVENOUS; SUBCUTANEOUS at 16:50

## 2019-08-01 RX ADMIN — VANCOMYCIN HYDROCHLORIDE 1750 MG: 1 INJECTION, POWDER, LYOPHILIZED, FOR SOLUTION INTRAVENOUS at 16:18

## 2019-08-01 RX ADMIN — IBUPROFEN 400 MG: 400 TABLET ORAL at 17:58

## 2019-08-01 RX ADMIN — SIMVASTATIN 20 MG: 10 TABLET, FILM COATED ORAL at 20:44

## 2019-08-01 RX ADMIN — PIPERACILLIN AND TAZOBACTAM 3.38 G: 3; .375 INJECTION, POWDER, FOR SOLUTION INTRAVENOUS at 16:44

## 2019-08-01 RX ADMIN — INSULIN LISPRO 2 UNITS: 100 INJECTION, SOLUTION INTRAVENOUS; SUBCUTANEOUS at 20:49

## 2019-08-01 RX ADMIN — CARVEDILOL 12.5 MG: 6.25 TABLET, FILM COATED ORAL at 16:42

## 2019-08-01 RX ADMIN — Medication 10 ML: at 20:45

## 2019-08-01 RX ADMIN — SODIUM CHLORIDE 500 ML: 9 INJECTION, SOLUTION INTRAVENOUS at 15:16

## 2019-08-01 RX ADMIN — INSULIN LISPRO 5 UNITS: 100 INJECTION, SOLUTION INTRAVENOUS; SUBCUTANEOUS at 15:23

## 2019-08-01 RX ADMIN — INSULIN GLARGINE 12 UNITS: 100 INJECTION, SOLUTION SUBCUTANEOUS at 20:49

## 2019-08-01 RX ADMIN — INSULIN LISPRO 5 UNITS: 100 INJECTION, SOLUTION INTRAVENOUS; SUBCUTANEOUS at 15:24

## 2019-08-01 RX ADMIN — TIZANIDINE 4 MG: 4 TABLET ORAL at 20:45

## 2019-08-01 ASSESSMENT — PAIN SCALES - GENERAL: PAINLEVEL_OUTOF10: 4

## 2019-08-01 NOTE — H&P
Hospital Medicine History & Physical      PCP: Jessica Charles MD    Date of Admission: 8/1/2019    Date of Service: Pt seen/examined on 8/1/2019   Chief Complaint:  Foot swelling     History Of Present Illness: The patient is a 41 y.o. male with uncontrolled DM2, diabetic neuropathy, HTN, obesity who presented to  PCP office with complaint of right great toe swelling. He has had a callus in the area for about 1 month. He thought it was healing, therefore he stopped watching it, then noticed significant worsening over the past 5 days with redness spreading to the foot. Fever to 102. No nausea or vomiting. Has severe neuropathy and can't feel his feet.      Exam in the clinic was consistent with diabetic foot ulcer with cellulitis and concern for underlying osteomyelitis. He was directly admitted to [facility] for IV abx, XR, and podiatry c/s     Past Medical History:        Diagnosis Date    Arthritis     OA    Clostridium difficile infection 11/01/2016    PCR+    Diabetes mellitus ([address]     TYPE 2- NO MEDS SINCE WEIGHT LOSS    Hyperlipidemia     Hypertension     Medial meniscus tear     LEFT    Osteoarthritis     Type II or unspecified type diabetes mellitus without mention of complication, not stated as uncontrolled        Past Surgical History:        Procedure Laterality Date    KNEE ARTHROSCOPY Left [id number]    LEFT KNEE ARTHROSCOPY , SYNOVECTOMY       TONSILLECTOMY      WISDOM TOOTH EXTRACTION         Medications Prior to Admission:    Prior to Admission medications    Medication Sig Start Date End Date Taking? Authorizing Provider   gabapentin (NEURONTIN) 300 MG capsule Take 2 capsules by mouth 3 times daily for 30 days.  7/22/19 8/21/19 Yes Susie Baker MD   losartan (COZAAR) 50 MG tablet TAKE 1 TABLET BY MOUTH DAILY 7/1/19  Yes Jessica Charles MD   PARoxetine (PAXIL) 10 MG tablet TAKE 1 TABLET BY MOUTH EVERY MORNING 6/18/19  Yes Jessica Charles MD   gemfibrozil LVM for patient to return call    (LOPID) 600 MG tablet Take 1 tablet by mouth 2 times daily (before meals) 5/2/19  Yes Seabron Boast, MD   insulin glargine (TOUJEO SOLOSTAR) 300 UNIT/ML injection pen Inject 15 Units into the skin nightly 5/2/19  Yes Seabron Boast, MD   insulin lispro (HUMALOG KWIKPEN) 100 UNIT/ML pen Inject 5 Units into the skin 3 times daily (before meals) 5/2/19  Yes Seabron Boast, MD   furosemide (LASIX) 20 MG tablet Take 1 tablet by mouth daily 5/2/19  Yes Seabron Boast, MD   carvedilol (COREG) 12.5 MG tablet Take 1 tablet by mouth 2 times daily (with meals) 5/2/19  Yes Seabron Boast, MD   glimepiride (AMARYL) 4 MG tablet Take 1 tablet by mouth every morning (before breakfast) 5/2/19  Yes Seabron Boast, MD   simvastatin (ZOCOR) 20 MG tablet TAKE ONE TABLET BY MOUTH ONCE NIGHTLY 5/2/19  Yes Seabron Boast, MD   Empagliflozin-metFORMIN HCl ER 5-1000 MG TB24 Take 1 tablet by mouth daily 5/2/19   Seabron Boast, MD   sildenafil (VIAGRA) 100 MG tablet Take 1 tablet by mouth as needed for Erectile Dysfunction 5/2/19   Seabron Boast, MD   glucose blood VI test strips (ASCENSIA AUTODISC VI;ONE TOUCH ULTRA TEST VI) strip Test threes time a day. DX: E11.9 11/16/17   Seabron Boast, MD   Lancets MISC Test three times a day with Contour Next EZ machine. DX: E11.9 11/16/17   Seabron Boast, MD   Insulin Syringe-Needle U-100 (AIMSCO INS SYR .3CC/29GX0.5\") 29G X 1/2\" 0.3 ML MISC 1 each by Does not apply route daily. 9/5/14   Seabron Boast, MD   glucose blood VI test strips (EXACTECH TEST) strip 1 each by In Vitro route daily. As needed. 9/5/14   Seabron Boast, MD       Allergies:  Evi Solo [sitagliptin] and Celina Higginbothameds isothiocyanate]    Social History:  The patient currently lives at home     TOBACCO:   reports that he quit smoking about 13 years ago. He has a 1.00 pack-year smoking history. He quit smokeless tobacco use about 13 years ago.   ETOH:   reports that after discussion with my PA formulated the plan. Agree with note with the following edits. HPI:     I reviewed the patient's Past Medical History, Past Surgical History, Medications, and Allergies. 41 y.o. male with uncontrolled DM2, diabetic neuropathy, HTN, obesity who presented to  PCP office with complaint of right great toe swelling. He has had a callus in the area for about 1 month. He thought it was healing, therefore he stopped watching it, then noticed significant worsening over the past 5 days with redness spreading to the foot. Fever to 102. General: young male,   Awake, alert and oriented. Appears to be not in any distress  Mucous Membranes:  Pink , anicteric  Neck: No JVD, no carotid bruit, no thyromegaly  Chest:  Clear to auscultation bilaterally, no added sounds  Cardiovascular:  RRR S1S2 heard, no murmurs or gallops  Abdomen:  Soft, undistended, non tender, no organomegaly, BS present  Extremities: well healed finger burn injuries  Right plantar foot ulcer on bottom of great toe with small ulceration and oozing. Severe spreading cellulitis involving dorsal aspect of right great toe and metatarsal area  No edema or cyanosis. Distal pulses well felt  Neurological : grossly normal            CBC:   Recent Labs     08/01/19  1421   WBC 10.7   HGB 14.7   HCT 42.1   MCV 94.1        BMP:   Recent Labs     08/01/19  1420   *   K 4.0   CL 94*   CO2 26   BUN 12   CREATININE 0.7*     LIVER PROFILE:   Recent Labs     08/01/19  1420   AST 16   ALT 20   BILITOT 1.9*   ALKPHOS 128     PT/INR: No results for input(s): PROTIME, INR in the last 72 hours. APTT: No results for input(s): APTT in the last 72 hours. UA:No results for input(s): NITRITE, COLORU, PHUR, LABCAST, WBCUA, RBCUA, MUCUS, TRICHOMONAS, YEAST, BACTERIA, CLARITYU, SPECGRAV, LEUKOCYTESUR, UROBILINOGEN, BILIRUBINUR, BLOODU, GLUCOSEU, AMORPHOUS in the last 72 hours.     Invalid input(s): KETONESU       CARDIAC ENZYMES  No

## 2019-08-02 LAB
ANION GAP SERPL CALCULATED.3IONS-SCNC: 12 MMOL/L (ref 3–16)
BASOPHILS ABSOLUTE: 0.1 K/UL (ref 0–0.2)
BASOPHILS RELATIVE PERCENT: 0.5 %
BUN BLDV-MCNC: 13 MG/DL (ref 7–20)
CALCIUM SERPL-MCNC: 8.8 MG/DL (ref 8.3–10.6)
CHLORIDE BLD-SCNC: 93 MMOL/L (ref 99–110)
CO2: 24 MMOL/L (ref 21–32)
CREAT SERPL-MCNC: 0.6 MG/DL (ref 0.9–1.3)
EOSINOPHILS ABSOLUTE: 0.2 K/UL (ref 0–0.6)
EOSINOPHILS RELATIVE PERCENT: 1 %
ESTIMATED AVERAGE GLUCOSE: 220.2 MG/DL
GFR AFRICAN AMERICAN: >60
GFR NON-AFRICAN AMERICAN: >60
GLUCOSE BLD-MCNC: 239 MG/DL (ref 70–99)
GLUCOSE BLD-MCNC: 253 MG/DL (ref 70–99)
GLUCOSE BLD-MCNC: 265 MG/DL (ref 70–99)
GLUCOSE BLD-MCNC: 291 MG/DL (ref 70–99)
GLUCOSE BLD-MCNC: 300 MG/DL (ref 70–99)
GLUCOSE BLD-MCNC: 333 MG/DL (ref 70–99)
HBA1C MFR BLD: 9.3 %
HCT VFR BLD CALC: 41.2 % (ref 40.5–52.5)
HEMOGLOBIN: 14.2 G/DL (ref 13.5–17.5)
LYMPHOCYTES ABSOLUTE: 1.6 K/UL (ref 1–5.1)
LYMPHOCYTES RELATIVE PERCENT: 10.5 %
MCH RBC QN AUTO: 32 PG (ref 26–34)
MCHC RBC AUTO-ENTMCNC: 34.6 G/DL (ref 31–36)
MCV RBC AUTO: 92.5 FL (ref 80–100)
MONOCYTES ABSOLUTE: 1.2 K/UL (ref 0–1.3)
MONOCYTES RELATIVE PERCENT: 8.1 %
NEUTROPHILS ABSOLUTE: 12.2 K/UL (ref 1.7–7.7)
NEUTROPHILS RELATIVE PERCENT: 79.9 %
PDW BLD-RTO: 12.5 % (ref 12.4–15.4)
PERFORMED ON: ABNORMAL
PLATELET # BLD: 246 K/UL (ref 135–450)
PMV BLD AUTO: 7.1 FL (ref 5–10.5)
POTASSIUM REFLEX MAGNESIUM: 3.9 MMOL/L (ref 3.5–5.1)
RBC # BLD: 4.45 M/UL (ref 4.2–5.9)
SODIUM BLD-SCNC: 129 MMOL/L (ref 136–145)
WBC # BLD: 15.3 K/UL (ref 4–11)

## 2019-08-02 PROCEDURE — 2580000003 HC RX 258: Performed by: PHYSICIAN ASSISTANT

## 2019-08-02 PROCEDURE — 6370000000 HC RX 637 (ALT 250 FOR IP): Performed by: PHYSICIAN ASSISTANT

## 2019-08-02 PROCEDURE — 85025 COMPLETE CBC W/AUTO DIFF WBC: CPT

## 2019-08-02 PROCEDURE — 99232 SBSQ HOSP IP/OBS MODERATE 35: CPT | Performed by: INTERNAL MEDICINE

## 2019-08-02 PROCEDURE — 1200000000 HC SEMI PRIVATE

## 2019-08-02 PROCEDURE — 94761 N-INVAS EAR/PLS OXIMETRY MLT: CPT

## 2019-08-02 PROCEDURE — 36415 COLL VENOUS BLD VENIPUNCTURE: CPT

## 2019-08-02 PROCEDURE — 6370000000 HC RX 637 (ALT 250 FOR IP): Performed by: INTERNAL MEDICINE

## 2019-08-02 PROCEDURE — 6360000002 HC RX W HCPCS: Performed by: PHYSICIAN ASSISTANT

## 2019-08-02 PROCEDURE — 80048 BASIC METABOLIC PNL TOTAL CA: CPT

## 2019-08-02 RX ORDER — ACETAMINOPHEN 325 MG/1
650 TABLET ORAL EVERY 4 HOURS PRN
Status: DISCONTINUED | OUTPATIENT
Start: 2019-08-02 | End: 2019-08-07 | Stop reason: HOSPADM

## 2019-08-02 RX ORDER — INSULIN GLARGINE 100 [IU]/ML
20 INJECTION, SOLUTION SUBCUTANEOUS NIGHTLY
Status: DISCONTINUED | OUTPATIENT
Start: 2019-08-02 | End: 2019-08-07 | Stop reason: HOSPADM

## 2019-08-02 RX ADMIN — INSULIN LISPRO 5 UNITS: 100 INJECTION, SOLUTION INTRAVENOUS; SUBCUTANEOUS at 17:15

## 2019-08-02 RX ADMIN — INSULIN LISPRO 3 UNITS: 100 INJECTION, SOLUTION INTRAVENOUS; SUBCUTANEOUS at 17:16

## 2019-08-02 RX ADMIN — Medication 10 ML: at 20:45

## 2019-08-02 RX ADMIN — INSULIN LISPRO 4 UNITS: 100 INJECTION, SOLUTION INTRAVENOUS; SUBCUTANEOUS at 08:06

## 2019-08-02 RX ADMIN — CARVEDILOL 12.5 MG: 6.25 TABLET, FILM COATED ORAL at 16:09

## 2019-08-02 RX ADMIN — INSULIN GLARGINE 20 UNITS: 100 INJECTION, SOLUTION SUBCUTANEOUS at 20:46

## 2019-08-02 RX ADMIN — SIMVASTATIN 20 MG: 10 TABLET, FILM COATED ORAL at 20:44

## 2019-08-02 RX ADMIN — INSULIN LISPRO 2 UNITS: 100 INJECTION, SOLUTION INTRAVENOUS; SUBCUTANEOUS at 20:46

## 2019-08-02 RX ADMIN — INSULIN LISPRO 5 UNITS: 100 INJECTION, SOLUTION INTRAVENOUS; SUBCUTANEOUS at 12:19

## 2019-08-02 RX ADMIN — VANCOMYCIN HYDROCHLORIDE 1750 MG: 1 INJECTION, POWDER, LYOPHILIZED, FOR SOLUTION INTRAVENOUS at 16:05

## 2019-08-02 RX ADMIN — TIZANIDINE 4 MG: 4 TABLET ORAL at 20:45

## 2019-08-02 RX ADMIN — PIPERACILLIN AND TAZOBACTAM 3.38 G: 3; .375 INJECTION, POWDER, FOR SOLUTION INTRAVENOUS at 08:02

## 2019-08-02 RX ADMIN — VANCOMYCIN HYDROCHLORIDE 1750 MG: 1 INJECTION, POWDER, LYOPHILIZED, FOR SOLUTION INTRAVENOUS at 04:12

## 2019-08-02 RX ADMIN — CARVEDILOL 12.5 MG: 6.25 TABLET, FILM COATED ORAL at 08:00

## 2019-08-02 RX ADMIN — GABAPENTIN 600 MG: 300 CAPSULE ORAL at 20:45

## 2019-08-02 RX ADMIN — INSULIN LISPRO 3 UNITS: 100 INJECTION, SOLUTION INTRAVENOUS; SUBCUTANEOUS at 12:19

## 2019-08-02 RX ADMIN — ACETAMINOPHEN 650 MG: 325 TABLET ORAL at 20:55

## 2019-08-02 RX ADMIN — IBUPROFEN 400 MG: 400 TABLET ORAL at 06:33

## 2019-08-02 RX ADMIN — ONDANSETRON 4 MG: 2 INJECTION INTRAMUSCULAR; INTRAVENOUS at 06:33

## 2019-08-02 RX ADMIN — PIPERACILLIN AND TAZOBACTAM 3.38 G: 3; .375 INJECTION, POWDER, FOR SOLUTION INTRAVENOUS at 16:06

## 2019-08-02 RX ADMIN — GABAPENTIN 600 MG: 300 CAPSULE ORAL at 12:17

## 2019-08-02 ASSESSMENT — PAIN DESCRIPTION - LOCATION
LOCATION: GENERALIZED
LOCATION: HEAD

## 2019-08-02 ASSESSMENT — PAIN DESCRIPTION - DESCRIPTORS
DESCRIPTORS: ACHING
DESCRIPTORS: HEADACHE

## 2019-08-02 ASSESSMENT — PAIN SCALES - GENERAL
PAINLEVEL_OUTOF10: 0
PAINLEVEL_OUTOF10: 8
PAINLEVEL_OUTOF10: 1
PAINLEVEL_OUTOF10: 0

## 2019-08-02 ASSESSMENT — PAIN DESCRIPTION - PAIN TYPE
TYPE: ACUTE PAIN
TYPE: ACUTE PAIN

## 2019-08-02 NOTE — CARE COORDINATION
Case Management Assessment  Initial Evaluation    Date/Time of Evaluation: 8/2/2019 8:58 AM  Assessment Completed by: Ceci Merida    Patient Name: Eleanore Sacks  YOB: 1975  Diagnosis: Neuropathic diabetic ulcer of foot ([facility] [S14.978, L97.509, E11.40]  Date / Time: 8/1/2019  1:43 PM  Admission status/Date:inpatient 08/01/19  Chart Reviewed: Yes      Patient Interviewed: Yes   Family Interviewed:  No      Hospitalization in the last 30 days:  No    Contacts  :     Relationship to Patient:   Phone Number:    Alternate Contact:     Relationship to Patient:     Phone Number:    Met with:    Current PCP  Charles Ng MD      Financial  Commercial  Precert required for SNF : Y, N        3 night stay required - Y, N    ADLS  Support Systems: Family Members, Spouse/Significant Other, Friends/Neighbors  Transportation: self    Meal Preparation: self    Housing  Home Environment: home with spouse  Steps: one  Plans to Return to Present Housing: Yes  Other Identified Issues: no    Home Care Information  Currently active with [facility] [address] : No  Type of Home Care Services: None  Passport/Waiver : No  :                      Phone Number:    Passport/Waiver Services:   no          Durable Medical Equipment   DME Provider: n/a  Equipment: Walker__Cane__RTS__ BSC__Shower Chair__  02__ HHN__ CPAP__  BiPap__  Hospital Bed__ W/C___ Other__________      Has Home O2 in place on admit:  No  Informed of need to bring portable home O2 tank on day of discharge for nursing to connect prior to leaving:   Not Indicated  Verbalized agreement/Understanding:   Not Indicated    Community Service Affiliation  Dialysis:  No    · Name:  · Location  · Dialysis Schedule:  · Phone:   · Fax: Outpatient PT/OT: No    Cancer Center: No     CHF Clinic: No     Pulmonary Rehab: No  Pain Clinic: No  Community Mental Health: No    Wound Clinic: No     Other: no    DISCHARGE PLAN: Reviewed Chart.

## 2019-08-02 NOTE — DISCHARGE INSTR - COC
Continuity of Care Form    Patient Name: Makenna Jerry   :  1975  MRN:  [id number]    Admit date:  2019  Discharge date:  2019    Code Status Order: Full Code   Advance Directives:   [facility] Documentation     Date/Time Healthcare Directive Type of Healthcare Directive Copy in [facility] [address] Agent's Name Healthcare Agent's Phone Number    19 1410  No, patient does not have an advance directive for healthcare treatment -- -- -- -- --          Admitting Physician:  Jostin Mendoza MD  PCP: Jostin Mendoza MD    Discharging Nurse: [facility] Unit/Room#: 0222/0222-01  Discharging Unit Phone Number: [phone]    Emergency Contact:   Extended Emergency Contact Information  Primary Emergency Contact: Juarez Osei  Address: [address] [place] [address] Phone: [phone]  Relation: Spouse    Past Surgical History:  Past Surgical History:   Procedure Laterality Date    KNEE ARTHROSCOPY Left [id number]    LEFT KNEE ARTHROSCOPY , SYNOVECTOMY       TONSILLECTOMY      WISDOM TOOTH EXTRACTION         Immunization History: There is no immunization history on file for this patient.     Active Problems:  Patient Active Problem List   Diagnosis Code    Hypertension I10    Diabetes mellitus ([address] E11.9    CHF (congestive heart failure) ([facility]) I50.9    Cardiomyopathy ([address] I42.9    Mixed hyperlipidemia E78.2    Diabetic peripheral neuropathy associated with type 2 diabetes mellitus ([facility] E11.42    Neuropathic diabetic ulcer of foot ([facility]) E11.621, L97.509, E11.40       Isolation/Infection:   Isolation          No Isolation            Nurse Assessment:  Last Vital Signs: /80   Pulse 108   Temp 100.3 °F (37.9 °C) (Oral)   Resp 16   Ht 6' (1.829 m)   Wt 268 lb (121.6 kg)   SpO2 96%   BMI 36.35 kg/m²     Last documented pain score (0-10 scale): Pain Level: 0  Last Discharge:   Respiratory Treatments: none  Oxygen Therapy:  is not on home oxygen therapy. Ventilator:    - No ventilator support    Rehab Therapies: none  Weight Bearing Status/Restrictions: No weight bearing restirctions  Other Medical Equipment (for information only, NOT a DME order):  Post op shoe  Other Treatments:     Patient's personal belongings (please select all that are sent with patient):  None    RN SIGNATURE:  Electronically signed by Anil Desai. Gina Randall RN on 8/7/19 at 3:21 PM    CASE MANAGEMENT/SOCIAL WORK SECTION    Inpatient Status Date: 8/1/2019    Readmission Risk Assessment Score:  Readmission Risk              Risk of Unplanned Readmission:        7           Discharging to Facility/ Agency   · Name: [facility]  · Address:  · Phone:[phone]  · Fax:[phone]    Dialysis Facility (if applicable)   · Name:  · Address:  · Dialysis Schedule:  · Phone:  · Fax:    / signature: Electronically signed by Caio Antonio RN on 8/7/19 at 2:29 PM    PHYSICIAN SECTION    Prognosis: Good    Condition at Discharge: Stable    Rehab Potential (if transferring to Rehab):          Recommended Labs or Other Treatments After Discharge:    ROCEPHIN 2 GMS Q24H IVPB X 4 WEEKS THEN CALL DR. PASCUAL FOR MORE ORDERS: [phone]      CBC WITH DIFF, CREATININE, CRP WEEKLY: FAX ALL RESULTS [phone]    PT TO MAKE APPOINTMENT TO SEE DR. PASCUAL IN THE OFFICE 2 WEEKS FROM NEXT Monday: [phone]    Call Dr. Aamir Manriquez for fever, rash, vomiting or diarrhea, increasing pain, or increasing numbness in feet : [phone]    Electronically signed by Pan Drake MD on 8/7/2019 at 2:06 PM        Physician Certification: I certify the above information and transfer of Napoleon Ayala  is necessary for the continuing treatment of the diagnosis listed and that he requires home care for less 30 days.      Update Admission H&P: No change in H&P    PHYSICIAN SIGNATURE:  MEDARDO Reina

## 2019-08-03 LAB
ANION GAP SERPL CALCULATED.3IONS-SCNC: 10 MMOL/L (ref 3–16)
BASOPHILS ABSOLUTE: 0.1 K/UL (ref 0–0.2)
BASOPHILS RELATIVE PERCENT: 0.7 %
BUN BLDV-MCNC: 14 MG/DL (ref 7–20)
CALCIUM SERPL-MCNC: 8.8 MG/DL (ref 8.3–10.6)
CHLORIDE BLD-SCNC: 95 MMOL/L (ref 99–110)
CO2: 26 MMOL/L (ref 21–32)
CREAT SERPL-MCNC: 0.7 MG/DL (ref 0.9–1.3)
EOSINOPHILS ABSOLUTE: 0.2 K/UL (ref 0–0.6)
EOSINOPHILS RELATIVE PERCENT: 2.7 %
GFR AFRICAN AMERICAN: >60
GFR NON-AFRICAN AMERICAN: >60
GLUCOSE BLD-MCNC: 263 MG/DL (ref 70–99)
GLUCOSE BLD-MCNC: 271 MG/DL (ref 70–99)
GLUCOSE BLD-MCNC: 287 MG/DL (ref 70–99)
GLUCOSE BLD-MCNC: 294 MG/DL (ref 70–99)
GLUCOSE BLD-MCNC: 302 MG/DL (ref 70–99)
GLUCOSE BLD-MCNC: 320 MG/DL (ref 70–99)
HCT VFR BLD CALC: 38 % (ref 40.5–52.5)
HEMOGLOBIN: 13.3 G/DL (ref 13.5–17.5)
LYMPHOCYTES ABSOLUTE: 1.7 K/UL (ref 1–5.1)
LYMPHOCYTES RELATIVE PERCENT: 21.8 %
MCH RBC QN AUTO: 32.7 PG (ref 26–34)
MCHC RBC AUTO-ENTMCNC: 35 G/DL (ref 31–36)
MCV RBC AUTO: 93.6 FL (ref 80–100)
MONOCYTES ABSOLUTE: 1.1 K/UL (ref 0–1.3)
MONOCYTES RELATIVE PERCENT: 15 %
NEUTROPHILS ABSOLUTE: 4.6 K/UL (ref 1.7–7.7)
NEUTROPHILS RELATIVE PERCENT: 59.8 %
PDW BLD-RTO: 12.8 % (ref 12.4–15.4)
PERFORMED ON: ABNORMAL
PLATELET # BLD: 203 K/UL (ref 135–450)
PMV BLD AUTO: 7.4 FL (ref 5–10.5)
POTASSIUM SERPL-SCNC: 3.9 MMOL/L (ref 3.5–5.1)
RBC # BLD: 4.06 M/UL (ref 4.2–5.9)
SEDIMENTATION RATE, ERYTHROCYTE: 68 MM/HR (ref 0–15)
SODIUM BLD-SCNC: 131 MMOL/L (ref 136–145)
VANCOMYCIN TROUGH: 7.8 UG/ML (ref 10–20)
WBC # BLD: 7.6 K/UL (ref 4–11)

## 2019-08-03 PROCEDURE — 85652 RBC SED RATE AUTOMATED: CPT

## 2019-08-03 PROCEDURE — 6370000000 HC RX 637 (ALT 250 FOR IP): Performed by: PHYSICIAN ASSISTANT

## 2019-08-03 PROCEDURE — 86140 C-REACTIVE PROTEIN: CPT

## 2019-08-03 PROCEDURE — 2580000003 HC RX 258: Performed by: PHYSICIAN ASSISTANT

## 2019-08-03 PROCEDURE — 80202 ASSAY OF VANCOMYCIN: CPT

## 2019-08-03 PROCEDURE — 2580000003 HC RX 258: Performed by: INTERNAL MEDICINE

## 2019-08-03 PROCEDURE — 6360000002 HC RX W HCPCS: Performed by: PHYSICIAN ASSISTANT

## 2019-08-03 PROCEDURE — 6360000002 HC RX W HCPCS: Performed by: INTERNAL MEDICINE

## 2019-08-03 PROCEDURE — 80048 BASIC METABOLIC PNL TOTAL CA: CPT

## 2019-08-03 PROCEDURE — 0KBV0ZZ EXCISION OF RIGHT FOOT MUSCLE, OPEN APPROACH: ICD-10-PCS | Performed by: PODIATRIST

## 2019-08-03 PROCEDURE — 99232 SBSQ HOSP IP/OBS MODERATE 35: CPT | Performed by: INTERNAL MEDICINE

## 2019-08-03 PROCEDURE — 6370000000 HC RX 637 (ALT 250 FOR IP): Performed by: INTERNAL MEDICINE

## 2019-08-03 PROCEDURE — 85025 COMPLETE CBC W/AUTO DIFF WBC: CPT

## 2019-08-03 PROCEDURE — 36415 COLL VENOUS BLD VENIPUNCTURE: CPT

## 2019-08-03 PROCEDURE — 1200000000 HC SEMI PRIVATE

## 2019-08-03 RX ORDER — DOCUSATE SODIUM 100 MG/1
100 CAPSULE, LIQUID FILLED ORAL DAILY
Status: DISCONTINUED | OUTPATIENT
Start: 2019-08-03 | End: 2019-08-07 | Stop reason: HOSPADM

## 2019-08-03 RX ORDER — INSULIN GLARGINE 100 [IU]/ML
10 INJECTION, SOLUTION SUBCUTANEOUS ONCE
Status: COMPLETED | OUTPATIENT
Start: 2019-08-03 | End: 2019-08-03

## 2019-08-03 RX ADMIN — GEMFIBROZIL 600 MG: 600 TABLET ORAL at 15:31

## 2019-08-03 RX ADMIN — GEMFIBROZIL 600 MG: 600 TABLET ORAL at 07:14

## 2019-08-03 RX ADMIN — CARVEDILOL 12.5 MG: 6.25 TABLET, FILM COATED ORAL at 08:46

## 2019-08-03 RX ADMIN — PAROXETINE HYDROCHLORIDE 10 MG: 20 TABLET, FILM COATED ORAL at 08:47

## 2019-08-03 RX ADMIN — TIZANIDINE 4 MG: 4 TABLET ORAL at 21:29

## 2019-08-03 RX ADMIN — INSULIN LISPRO 5 UNITS: 100 INJECTION, SOLUTION INTRAVENOUS; SUBCUTANEOUS at 11:30

## 2019-08-03 RX ADMIN — INSULIN LISPRO 3 UNITS: 100 INJECTION, SOLUTION INTRAVENOUS; SUBCUTANEOUS at 16:41

## 2019-08-03 RX ADMIN — INSULIN LISPRO 5 UNITS: 100 INJECTION, SOLUTION INTRAVENOUS; SUBCUTANEOUS at 07:14

## 2019-08-03 RX ADMIN — PIPERACILLIN AND TAZOBACTAM 3.38 G: 3; .375 INJECTION, POWDER, FOR SOLUTION INTRAVENOUS at 08:46

## 2019-08-03 RX ADMIN — Medication 10 ML: at 14:01

## 2019-08-03 RX ADMIN — GLIMEPIRIDE 4 MG: 2 TABLET ORAL at 07:14

## 2019-08-03 RX ADMIN — DOCUSATE SODIUM 100 MG: 100 CAPSULE, LIQUID FILLED ORAL at 11:25

## 2019-08-03 RX ADMIN — INSULIN LISPRO 2 UNITS: 100 INJECTION, SOLUTION INTRAVENOUS; SUBCUTANEOUS at 21:29

## 2019-08-03 RX ADMIN — GABAPENTIN 600 MG: 300 CAPSULE ORAL at 13:42

## 2019-08-03 RX ADMIN — GABAPENTIN 600 MG: 300 CAPSULE ORAL at 08:46

## 2019-08-03 RX ADMIN — VANCOMYCIN HYDROCHLORIDE 1250 MG: 10 INJECTION, POWDER, LYOPHILIZED, FOR SOLUTION INTRAVENOUS at 14:01

## 2019-08-03 RX ADMIN — VANCOMYCIN HYDROCHLORIDE 1750 MG: 1 INJECTION, POWDER, LYOPHILIZED, FOR SOLUTION INTRAVENOUS at 05:17

## 2019-08-03 RX ADMIN — CARVEDILOL 12.5 MG: 6.25 TABLET, FILM COATED ORAL at 16:38

## 2019-08-03 RX ADMIN — FUROSEMIDE 20 MG: 20 TABLET ORAL at 08:47

## 2019-08-03 RX ADMIN — VANCOMYCIN HYDROCHLORIDE 1250 MG: 10 INJECTION, POWDER, LYOPHILIZED, FOR SOLUTION INTRAVENOUS at 21:28

## 2019-08-03 RX ADMIN — SIMVASTATIN 20 MG: 10 TABLET, FILM COATED ORAL at 21:29

## 2019-08-03 RX ADMIN — INSULIN LISPRO 5 UNITS: 100 INJECTION, SOLUTION INTRAVENOUS; SUBCUTANEOUS at 16:42

## 2019-08-03 RX ADMIN — PIPERACILLIN AND TAZOBACTAM 3.38 G: 3; .375 INJECTION, POWDER, FOR SOLUTION INTRAVENOUS at 15:32

## 2019-08-03 RX ADMIN — INSULIN GLARGINE 20 UNITS: 100 INJECTION, SOLUTION SUBCUTANEOUS at 21:29

## 2019-08-03 RX ADMIN — PIPERACILLIN AND TAZOBACTAM 3.38 G: 3; .375 INJECTION, POWDER, FOR SOLUTION INTRAVENOUS at 00:04

## 2019-08-03 RX ADMIN — INSULIN LISPRO 4 UNITS: 100 INJECTION, SOLUTION INTRAVENOUS; SUBCUTANEOUS at 08:51

## 2019-08-03 RX ADMIN — INSULIN GLARGINE 10 UNITS: 100 INJECTION, SOLUTION SUBCUTANEOUS at 11:28

## 2019-08-03 RX ADMIN — INSULIN LISPRO 3 UNITS: 100 INJECTION, SOLUTION INTRAVENOUS; SUBCUTANEOUS at 11:29

## 2019-08-03 RX ADMIN — GABAPENTIN 600 MG: 300 CAPSULE ORAL at 21:29

## 2019-08-03 RX ADMIN — Medication 10 ML: at 21:28

## 2019-08-03 NOTE — CONSULTS
Inpatient consult to Podiatry  Consult performed by: Jamaica Kruger DPM  Consult ordered by: Jacque Marley PA-C        Podiatric surgery consult note        CHIEF COMPLAINT:  \"No chief complaint on file. \"    Reason for Admission:  Neuropathic diabetic ulcer of foot ([facility] [E11.621, L97.509, E11.40]    History Obtained From:  patient    HISTORY OF PRESENT ILLNESS:      The patient is a 41 y.o. male with significant past medical history of Listed below who presents with diabetic foot infection. He states that approximately 4 to 6 weeks ago he injured his toe pulling a callus off while getting out of the shower. He states that he put some dressings on it and he thought that it healed up. He noticed that couple days ago he had more swelling and drainage coming out of the toe. He states that his blood sugar is well controlled but admits that his hemoglobin A1c was 9 the last time it was checked. He denies current nausea, vomiting, fever, chills, shortness of breath or chest pain. He denies any pain in the toe. Past Medical History:        Diagnosis Date    Arthritis     OA    Clostridium difficile infection 11/01/2016    PCR+    Diabetes mellitus ([facility]     TYPE 2- NO MEDS SINCE WEIGHT LOSS    Hyperlipidemia     Hypertension     Medial meniscus tear     LEFT    Osteoarthritis     Type II or unspecified type diabetes mellitus without mention of complication, not stated as uncontrolled      Past Surgical History:        Procedure Laterality Date    KNEE ARTHROSCOPY Left [id number]    LEFT KNEE ARTHROSCOPY , SYNOVECTOMY       TONSILLECTOMY      WISDOM TOOTH EXTRACTION       Immunizations:              Tetanus:  Tetanus vaccination status reviewed: last tetanus booster within 10 years.                 Current Facility-Administered Medications:     vancomycin (VANCOCIN) 1,250 mg in dextrose 5 % 250 mL IVPB, 1,250 mg, Intravenous, Q8H, Arlene Ahumada, MD    docusate sodium (COLACE) capsule 100 hydroxide (MILK OF MAGNESIA) 400 MG/5ML suspension 30 mL, 30 mL, Oral, Daily PRN, Edward Saunders PA-C    ondansetron (ZOFRAN) injection 4 mg, 4 mg, Intravenous, Q6H PRN, Edward Saunders PA-C, 4 mg at 19 0482    enoxaparin (LOVENOX) injection 40 mg, 40 mg, Subcutaneous, Daily, Cierra Saunders PA-C    insulin lispro (HUMALOG) injection vial 0-6 Units, 0-6 Units, Subcutaneous, TID WC, Cierra Saunders PA-C, 3 Units at 19 1129    insulin lispro (HUMALOG) injection vial 0-3 Units, 0-3 Units, Subcutaneous, Nightly, Cierra Saunders PA-C, 2 Units at 19    piperacillin-tazobactam (ZOSYN) 3.375 g in sodium chloride 0.9 % 100 mL IVPB extended infusion (mini-bag), 3.375 g, Intravenous, Q8H, Cierra Saunders PA-C, Last Rate: 25 mL/hr at 19 0846, 3.375 g at 19 0846    ibuprofen (ADVIL;MOTRIN) tablet 400 mg, 400 mg, Oral, Q6H PRN, Charles Ng MD, 400 mg at 19 174    tiZANidine (ZANAFLEX) tablet 4 mg, 4 mg, Oral, Nightly, Charles Ng MD, 4 mg at 19    Allergies:  Januvia [sitagliptin] and Mustard oil [allyl isothiocyanate]    Social History     Socioeconomic History    Marital status:      Spouse name: Not on file    Number of children: Not on file    Years of education: Not on file    Highest education level: Not on file   Occupational History    Not on file   Social Needs    Financial resource strain: Not on file    Food insecurity:     Worry: Not on file     Inability: Not on file    Transportation needs:     Medical: Not on file     Non-medical: Not on file   Tobacco Use    Smoking status: Former Smoker     Packs/day: 0.50     Years: 2.00     Pack years: 1.00     Last attempt to quit: 2005     Years since quittin.9    Smokeless tobacco: Former User     Quit date: 2005    Tobacco comment: SMOKED OCCASIONALLY UNTIL 8 YEARS AGO   Substance and Sexual Activity    Alcohol use:  Yes Documentation Flow Sheet      Wound Care Documentation:  Incision 08/15/13 Knee Left (Active)   Number of days: 2179       Wound 08/01/19 Foot Right;Plantar (Active)   Wound Diabetic 8/1/2019  2:44 PM   Dressing Status Clean;Dry; Intact 8/3/2019  8:46 AM   Dressing Changed Changed/New 8/1/2019  8:45 PM   Dressing/Treatment Foam 8/3/2019  8:46 AM   Wound Cleansed Rinsed/Irrigated with saline 8/1/2019  2:44 PM   Wound Length (cm) 1.2 cm 8/1/2019  2:44 PM   Wound Width (cm) 1 cm 8/1/2019  2:44 PM   Wound Depth (cm) 0.5 cm 8/1/2019  2:44 PM   Wound Surface Area (cm^2) 1.2 cm^2 8/1/2019  2:44 PM   Wound Volume (cm^3) 0.6 cm^3 8/1/2019  2:44 PM   Wound Assessment DEE 8/3/2019  8:46 AM   Drainage Amount None 8/3/2019  8:46 AM   Odor None 8/3/2019  8:46 AM   Margins Unattached edges 8/1/2019  2:44 PM   Shanita-wound Assessment Red 8/1/2019  2:44 PM   Number of days: 2       Total Surface Area Debrided:  2 sq cm     Percentage of wound debrided 100%    Bleeding:  Minimal    Hemostasis Achieved:  by pressure    Procedural Pain:  0  / 10     Post Procedural Pain:  0 / 10     Response to treatment:  Well tolerated by patient. ASSESSMENT AND PLAN:  -Diabetic foot infection with possible osteomyelitis  MRI ordered  Discussed treatment options with the patient in detail including antibiotics and possible amputation  Current salvage is hallux amputation  Continue IV antibiotics  Iodoform packing to wound change daily. DISPO: Await MRI I will reevaluate on Monday. Thanks for the opportunity to participate in this patient's care.      Jose Miguel Gonzalez [facility]   Cell # [phone]

## 2019-08-04 LAB
ANION GAP SERPL CALCULATED.3IONS-SCNC: 13 MMOL/L (ref 3–16)
BUN BLDV-MCNC: 11 MG/DL (ref 7–20)
C-REACTIVE PROTEIN: 77.1 MG/L (ref 0–5.1)
CALCIUM SERPL-MCNC: 8.6 MG/DL (ref 8.3–10.6)
CHLORIDE BLD-SCNC: 98 MMOL/L (ref 99–110)
CO2: 24 MMOL/L (ref 21–32)
CREAT SERPL-MCNC: 0.6 MG/DL (ref 0.9–1.3)
GFR AFRICAN AMERICAN: >60
GFR NON-AFRICAN AMERICAN: >60
GLUCOSE BLD-MCNC: 267 MG/DL (ref 70–99)
GLUCOSE BLD-MCNC: 268 MG/DL (ref 70–99)
GLUCOSE BLD-MCNC: 272 MG/DL (ref 70–99)
GLUCOSE BLD-MCNC: 282 MG/DL (ref 70–99)
GLUCOSE BLD-MCNC: 283 MG/DL (ref 70–99)
GLUCOSE BLD-MCNC: 288 MG/DL (ref 70–99)
PERFORMED ON: ABNORMAL
POTASSIUM SERPL-SCNC: 3.7 MMOL/L (ref 3.5–5.1)
SODIUM BLD-SCNC: 135 MMOL/L (ref 136–145)
VANCOMYCIN TROUGH: 13.1 UG/ML (ref 10–20)

## 2019-08-04 PROCEDURE — 2580000003 HC RX 258: Performed by: INTERNAL MEDICINE

## 2019-08-04 PROCEDURE — 99232 SBSQ HOSP IP/OBS MODERATE 35: CPT | Performed by: INTERNAL MEDICINE

## 2019-08-04 PROCEDURE — 80048 BASIC METABOLIC PNL TOTAL CA: CPT

## 2019-08-04 PROCEDURE — 6370000000 HC RX 637 (ALT 250 FOR IP): Performed by: INTERNAL MEDICINE

## 2019-08-04 PROCEDURE — 80202 ASSAY OF VANCOMYCIN: CPT

## 2019-08-04 PROCEDURE — 1200000000 HC SEMI PRIVATE

## 2019-08-04 PROCEDURE — 6370000000 HC RX 637 (ALT 250 FOR IP): Performed by: PHYSICIAN ASSISTANT

## 2019-08-04 PROCEDURE — 2580000003 HC RX 258: Performed by: PHYSICIAN ASSISTANT

## 2019-08-04 PROCEDURE — 36415 COLL VENOUS BLD VENIPUNCTURE: CPT

## 2019-08-04 PROCEDURE — 6360000002 HC RX W HCPCS: Performed by: PHYSICIAN ASSISTANT

## 2019-08-04 PROCEDURE — 6360000002 HC RX W HCPCS: Performed by: INTERNAL MEDICINE

## 2019-08-04 RX ORDER — INSULIN GLARGINE 100 [IU]/ML
10 INJECTION, SOLUTION SUBCUTANEOUS
Status: DISCONTINUED | OUTPATIENT
Start: 2019-08-04 | End: 2019-08-07 | Stop reason: HOSPADM

## 2019-08-04 RX ADMIN — VANCOMYCIN HYDROCHLORIDE 1250 MG: 10 INJECTION, POWDER, LYOPHILIZED, FOR SOLUTION INTRAVENOUS at 14:34

## 2019-08-04 RX ADMIN — INSULIN GLARGINE 20 UNITS: 100 INJECTION, SOLUTION SUBCUTANEOUS at 21:53

## 2019-08-04 RX ADMIN — INSULIN LISPRO 5 UNITS: 100 INJECTION, SOLUTION INTRAVENOUS; SUBCUTANEOUS at 16:50

## 2019-08-04 RX ADMIN — VANCOMYCIN HYDROCHLORIDE 1250 MG: 10 INJECTION, POWDER, LYOPHILIZED, FOR SOLUTION INTRAVENOUS at 05:30

## 2019-08-04 RX ADMIN — INSULIN LISPRO 3 UNITS: 100 INJECTION, SOLUTION INTRAVENOUS; SUBCUTANEOUS at 16:50

## 2019-08-04 RX ADMIN — GABAPENTIN 600 MG: 300 CAPSULE ORAL at 08:04

## 2019-08-04 RX ADMIN — FUROSEMIDE 20 MG: 20 TABLET ORAL at 08:03

## 2019-08-04 RX ADMIN — INSULIN LISPRO 3 UNITS: 100 INJECTION, SOLUTION INTRAVENOUS; SUBCUTANEOUS at 08:28

## 2019-08-04 RX ADMIN — PIPERACILLIN AND TAZOBACTAM 3.38 G: 3; .375 INJECTION, POWDER, FOR SOLUTION INTRAVENOUS at 16:36

## 2019-08-04 RX ADMIN — INSULIN LISPRO 2 UNITS: 100 INJECTION, SOLUTION INTRAVENOUS; SUBCUTANEOUS at 21:53

## 2019-08-04 RX ADMIN — GEMFIBROZIL 600 MG: 600 TABLET ORAL at 07:57

## 2019-08-04 RX ADMIN — VANCOMYCIN HYDROCHLORIDE 1250 MG: 10 INJECTION, POWDER, LYOPHILIZED, FOR SOLUTION INTRAVENOUS at 21:53

## 2019-08-04 RX ADMIN — PAROXETINE HYDROCHLORIDE 10 MG: 20 TABLET, FILM COATED ORAL at 08:04

## 2019-08-04 RX ADMIN — GABAPENTIN 600 MG: 300 CAPSULE ORAL at 14:34

## 2019-08-04 RX ADMIN — GABAPENTIN 600 MG: 300 CAPSULE ORAL at 21:52

## 2019-08-04 RX ADMIN — CARVEDILOL 12.5 MG: 6.25 TABLET, FILM COATED ORAL at 16:35

## 2019-08-04 RX ADMIN — INSULIN LISPRO 5 UNITS: 100 INJECTION, SOLUTION INTRAVENOUS; SUBCUTANEOUS at 08:27

## 2019-08-04 RX ADMIN — PIPERACILLIN AND TAZOBACTAM 3.38 G: 3; .375 INJECTION, POWDER, FOR SOLUTION INTRAVENOUS at 07:56

## 2019-08-04 RX ADMIN — SIMVASTATIN 20 MG: 10 TABLET, FILM COATED ORAL at 21:52

## 2019-08-04 RX ADMIN — TIZANIDINE 4 MG: 4 TABLET ORAL at 21:52

## 2019-08-04 RX ADMIN — GLIMEPIRIDE 4 MG: 2 TABLET ORAL at 07:57

## 2019-08-04 RX ADMIN — GEMFIBROZIL 600 MG: 600 TABLET ORAL at 16:36

## 2019-08-04 RX ADMIN — INSULIN LISPRO 5 UNITS: 100 INJECTION, SOLUTION INTRAVENOUS; SUBCUTANEOUS at 11:49

## 2019-08-04 RX ADMIN — PIPERACILLIN AND TAZOBACTAM 3.38 G: 3; .375 INJECTION, POWDER, FOR SOLUTION INTRAVENOUS at 00:10

## 2019-08-04 RX ADMIN — DOCUSATE SODIUM 100 MG: 100 CAPSULE, LIQUID FILLED ORAL at 08:04

## 2019-08-04 RX ADMIN — CARVEDILOL 12.5 MG: 6.25 TABLET, FILM COATED ORAL at 07:57

## 2019-08-04 RX ADMIN — INSULIN LISPRO 3 UNITS: 100 INJECTION, SOLUTION INTRAVENOUS; SUBCUTANEOUS at 11:49

## 2019-08-04 RX ADMIN — INSULIN GLARGINE 10 UNITS: 100 INJECTION, SOLUTION SUBCUTANEOUS at 08:30

## 2019-08-05 ENCOUNTER — TELEPHONE (OUTPATIENT)
Dept: INTERNAL MEDICINE CLINIC | Age: 44
End: 2019-08-05

## 2019-08-05 ENCOUNTER — APPOINTMENT (OUTPATIENT)
Dept: MRI IMAGING | Age: 44
DRG: 982 | End: 2019-08-05
Attending: INTERNAL MEDICINE
Payer: COMMERCIAL

## 2019-08-05 LAB
ANION GAP SERPL CALCULATED.3IONS-SCNC: 14 MMOL/L (ref 3–16)
BUN BLDV-MCNC: 11 MG/DL (ref 7–20)
CALCIUM SERPL-MCNC: 9 MG/DL (ref 8.3–10.6)
CHLORIDE BLD-SCNC: 96 MMOL/L (ref 99–110)
CO2: 23 MMOL/L (ref 21–32)
CREAT SERPL-MCNC: 0.7 MG/DL (ref 0.9–1.3)
GFR AFRICAN AMERICAN: >60
GFR NON-AFRICAN AMERICAN: >60
GLUCOSE BLD-MCNC: 224 MG/DL (ref 70–99)
GLUCOSE BLD-MCNC: 280 MG/DL (ref 70–99)
GLUCOSE BLD-MCNC: 291 MG/DL (ref 70–99)
GLUCOSE BLD-MCNC: 312 MG/DL (ref 70–99)
GLUCOSE BLD-MCNC: 319 MG/DL (ref 70–99)
GLUCOSE BLD-MCNC: 339 MG/DL (ref 70–99)
PERFORMED ON: ABNORMAL
POTASSIUM SERPL-SCNC: 3.9 MMOL/L (ref 3.5–5.1)
SODIUM BLD-SCNC: 133 MMOL/L (ref 136–145)
VANCOMYCIN TROUGH: 14.5 UG/ML (ref 10–20)

## 2019-08-05 PROCEDURE — 87070 CULTURE OTHR SPECIMN AEROBIC: CPT

## 2019-08-05 PROCEDURE — 6370000000 HC RX 637 (ALT 250 FOR IP): Performed by: INTERNAL MEDICINE

## 2019-08-05 PROCEDURE — 6360000002 HC RX W HCPCS: Performed by: INTERNAL MEDICINE

## 2019-08-05 PROCEDURE — 6360000002 HC RX W HCPCS: Performed by: PHYSICIAN ASSISTANT

## 2019-08-05 PROCEDURE — 87205 SMEAR GRAM STAIN: CPT

## 2019-08-05 PROCEDURE — 80048 BASIC METABOLIC PNL TOTAL CA: CPT

## 2019-08-05 PROCEDURE — 2580000003 HC RX 258: Performed by: PHYSICIAN ASSISTANT

## 2019-08-05 PROCEDURE — 2580000003 HC RX 258: Performed by: INTERNAL MEDICINE

## 2019-08-05 PROCEDURE — 6370000000 HC RX 637 (ALT 250 FOR IP): Performed by: PHYSICIAN ASSISTANT

## 2019-08-05 PROCEDURE — 99232 SBSQ HOSP IP/OBS MODERATE 35: CPT | Performed by: INTERNAL MEDICINE

## 2019-08-05 PROCEDURE — 73718 MRI LOWER EXTREMITY W/O DYE: CPT

## 2019-08-05 PROCEDURE — 36415 COLL VENOUS BLD VENIPUNCTURE: CPT

## 2019-08-05 PROCEDURE — 80202 ASSAY OF VANCOMYCIN: CPT

## 2019-08-05 PROCEDURE — 1200000000 HC SEMI PRIVATE

## 2019-08-05 RX ADMIN — INSULIN LISPRO 5 UNITS: 100 INJECTION, SOLUTION INTRAVENOUS; SUBCUTANEOUS at 07:27

## 2019-08-05 RX ADMIN — FUROSEMIDE 20 MG: 20 TABLET ORAL at 08:06

## 2019-08-05 RX ADMIN — Medication 10 ML: at 08:06

## 2019-08-05 RX ADMIN — GABAPENTIN 600 MG: 300 CAPSULE ORAL at 14:07

## 2019-08-05 RX ADMIN — PIPERACILLIN AND TAZOBACTAM 3.38 G: 3; .375 INJECTION, POWDER, FOR SOLUTION INTRAVENOUS at 08:06

## 2019-08-05 RX ADMIN — INSULIN LISPRO 9 UNITS: 100 INJECTION, SOLUTION INTRAVENOUS; SUBCUTANEOUS at 17:00

## 2019-08-05 RX ADMIN — GEMFIBROZIL 600 MG: 600 TABLET ORAL at 16:57

## 2019-08-05 RX ADMIN — INSULIN LISPRO 9 UNITS: 100 INJECTION, SOLUTION INTRAVENOUS; SUBCUTANEOUS at 11:20

## 2019-08-05 RX ADMIN — INSULIN LISPRO 5 UNITS: 100 INJECTION, SOLUTION INTRAVENOUS; SUBCUTANEOUS at 17:00

## 2019-08-05 RX ADMIN — SIMVASTATIN 20 MG: 10 TABLET, FILM COATED ORAL at 21:34

## 2019-08-05 RX ADMIN — VANCOMYCIN HYDROCHLORIDE 1250 MG: 10 INJECTION, POWDER, LYOPHILIZED, FOR SOLUTION INTRAVENOUS at 06:17

## 2019-08-05 RX ADMIN — INSULIN GLARGINE 10 UNITS: 100 INJECTION, SOLUTION SUBCUTANEOUS at 07:28

## 2019-08-05 RX ADMIN — GABAPENTIN 600 MG: 300 CAPSULE ORAL at 21:35

## 2019-08-05 RX ADMIN — GLIMEPIRIDE 4 MG: 2 TABLET ORAL at 07:42

## 2019-08-05 RX ADMIN — GABAPENTIN 600 MG: 300 CAPSULE ORAL at 08:06

## 2019-08-05 RX ADMIN — VANCOMYCIN HYDROCHLORIDE 1250 MG: 10 INJECTION, POWDER, LYOPHILIZED, FOR SOLUTION INTRAVENOUS at 21:34

## 2019-08-05 RX ADMIN — INSULIN LISPRO 5 UNITS: 100 INJECTION, SOLUTION INTRAVENOUS; SUBCUTANEOUS at 11:23

## 2019-08-05 RX ADMIN — PIPERACILLIN AND TAZOBACTAM 3.38 G: 3; .375 INJECTION, POWDER, FOR SOLUTION INTRAVENOUS at 16:58

## 2019-08-05 RX ADMIN — TIZANIDINE 4 MG: 4 TABLET ORAL at 21:34

## 2019-08-05 RX ADMIN — IBUPROFEN 400 MG: 400 TABLET ORAL at 21:41

## 2019-08-05 RX ADMIN — VANCOMYCIN HYDROCHLORIDE 1250 MG: 10 INJECTION, POWDER, LYOPHILIZED, FOR SOLUTION INTRAVENOUS at 14:07

## 2019-08-05 RX ADMIN — CARVEDILOL 12.5 MG: 6.25 TABLET, FILM COATED ORAL at 08:06

## 2019-08-05 RX ADMIN — PIPERACILLIN AND TAZOBACTAM 3.38 G: 3; .375 INJECTION, POWDER, FOR SOLUTION INTRAVENOUS at 00:30

## 2019-08-05 RX ADMIN — PAROXETINE HYDROCHLORIDE 10 MG: 20 TABLET, FILM COATED ORAL at 08:06

## 2019-08-05 RX ADMIN — GEMFIBROZIL 600 MG: 600 TABLET ORAL at 07:43

## 2019-08-05 RX ADMIN — INSULIN LISPRO 3 UNITS: 100 INJECTION, SOLUTION INTRAVENOUS; SUBCUTANEOUS at 21:41

## 2019-08-05 RX ADMIN — CARVEDILOL 12.5 MG: 6.25 TABLET, FILM COATED ORAL at 16:57

## 2019-08-05 RX ADMIN — INSULIN LISPRO 4 UNITS: 100 INJECTION, SOLUTION INTRAVENOUS; SUBCUTANEOUS at 08:11

## 2019-08-05 RX ADMIN — INSULIN GLARGINE 20 UNITS: 100 INJECTION, SOLUTION SUBCUTANEOUS at 21:42

## 2019-08-05 ASSESSMENT — PAIN DESCRIPTION - FREQUENCY: FREQUENCY: CONTINUOUS

## 2019-08-05 ASSESSMENT — PAIN DESCRIPTION - ONSET: ONSET: ON-GOING

## 2019-08-05 ASSESSMENT — PAIN SCALES - GENERAL
PAINLEVEL_OUTOF10: 3
PAINLEVEL_OUTOF10: 0

## 2019-08-05 ASSESSMENT — PAIN DESCRIPTION - PROGRESSION: CLINICAL_PROGRESSION: NOT CHANGED

## 2019-08-05 ASSESSMENT — PAIN DESCRIPTION - DESCRIPTORS: DESCRIPTORS: ACHING

## 2019-08-05 ASSESSMENT — PAIN DESCRIPTION - PAIN TYPE: TYPE: CHRONIC PAIN

## 2019-08-05 ASSESSMENT — PAIN - FUNCTIONAL ASSESSMENT: PAIN_FUNCTIONAL_ASSESSMENT: PREVENTS OR INTERFERES SOME ACTIVE ACTIVITIES AND ADLS

## 2019-08-06 ENCOUNTER — APPOINTMENT (OUTPATIENT)
Dept: GENERAL RADIOLOGY | Age: 44
DRG: 982 | End: 2019-08-06
Attending: INTERNAL MEDICINE
Payer: COMMERCIAL

## 2019-08-06 LAB
ANION GAP SERPL CALCULATED.3IONS-SCNC: 12 MMOL/L (ref 3–16)
BLOOD CULTURE, ROUTINE: NORMAL
BUN BLDV-MCNC: 12 MG/DL (ref 7–20)
C-REACTIVE PROTEIN: 21.6 MG/L (ref 0–5.1)
CALCIUM SERPL-MCNC: 8.9 MG/DL (ref 8.3–10.6)
CHLORIDE BLD-SCNC: 98 MMOL/L (ref 99–110)
CO2: 24 MMOL/L (ref 21–32)
CREAT SERPL-MCNC: 0.7 MG/DL (ref 0.9–1.3)
CULTURE, BLOOD 2: NORMAL
GFR AFRICAN AMERICAN: >60
GFR NON-AFRICAN AMERICAN: >60
GLUCOSE BLD-MCNC: 234 MG/DL (ref 70–99)
GLUCOSE BLD-MCNC: 268 MG/DL (ref 70–99)
GLUCOSE BLD-MCNC: 274 MG/DL (ref 70–99)
GLUCOSE BLD-MCNC: 289 MG/DL (ref 70–99)
GLUCOSE BLD-MCNC: 297 MG/DL (ref 70–99)
INR BLD: 1.06 (ref 0.86–1.14)
PERFORMED ON: ABNORMAL
POTASSIUM SERPL-SCNC: 4.2 MMOL/L (ref 3.5–5.1)
PROTHROMBIN TIME: 12.1 SEC (ref 9.8–13)
SEDIMENTATION RATE, ERYTHROCYTE: 57 MM/HR (ref 0–15)
SODIUM BLD-SCNC: 134 MMOL/L (ref 136–145)

## 2019-08-06 PROCEDURE — 1200000000 HC SEMI PRIVATE

## 2019-08-06 PROCEDURE — 6370000000 HC RX 637 (ALT 250 FOR IP): Performed by: INTERNAL MEDICINE

## 2019-08-06 PROCEDURE — 85610 PROTHROMBIN TIME: CPT

## 2019-08-06 PROCEDURE — 2580000003 HC RX 258: Performed by: PHYSICIAN ASSISTANT

## 2019-08-06 PROCEDURE — 36573 INSJ PICC RS&I 5 YR+: CPT

## 2019-08-06 PROCEDURE — 86140 C-REACTIVE PROTEIN: CPT

## 2019-08-06 PROCEDURE — 2580000003 HC RX 258: Performed by: INTERNAL MEDICINE

## 2019-08-06 PROCEDURE — 6360000002 HC RX W HCPCS: Performed by: INTERNAL MEDICINE

## 2019-08-06 PROCEDURE — 6360000002 HC RX W HCPCS: Performed by: PHYSICIAN ASSISTANT

## 2019-08-06 PROCEDURE — 99232 SBSQ HOSP IP/OBS MODERATE 35: CPT | Performed by: INTERNAL MEDICINE

## 2019-08-06 PROCEDURE — C1769 GUIDE WIRE: HCPCS

## 2019-08-06 PROCEDURE — 36415 COLL VENOUS BLD VENIPUNCTURE: CPT

## 2019-08-06 PROCEDURE — 85652 RBC SED RATE AUTOMATED: CPT

## 2019-08-06 PROCEDURE — 80048 BASIC METABOLIC PNL TOTAL CA: CPT

## 2019-08-06 PROCEDURE — 6370000000 HC RX 637 (ALT 250 FOR IP): Performed by: PHYSICIAN ASSISTANT

## 2019-08-06 PROCEDURE — 02HV33Z INSERTION OF INFUSION DEVICE INTO SUPERIOR VENA CAVA, PERCUTANEOUS APPROACH: ICD-10-PCS | Performed by: INTERNAL MEDICINE

## 2019-08-06 RX ORDER — SODIUM CHLORIDE 0.9 % (FLUSH) 0.9 %
10 SYRINGE (ML) INJECTION EVERY 12 HOURS SCHEDULED
Status: DISCONTINUED | OUTPATIENT
Start: 2019-08-06 | End: 2019-08-07 | Stop reason: HOSPADM

## 2019-08-06 RX ORDER — SODIUM CHLORIDE 0.9 % (FLUSH) 0.9 %
10 SYRINGE (ML) INJECTION PRN
Status: DISCONTINUED | OUTPATIENT
Start: 2019-08-06 | End: 2019-08-07 | Stop reason: HOSPADM

## 2019-08-06 RX ORDER — LIDOCAINE HYDROCHLORIDE 10 MG/ML
5 INJECTION, SOLUTION INFILTRATION; PERINEURAL ONCE
Status: DISCONTINUED | OUTPATIENT
Start: 2019-08-06 | End: 2019-08-07 | Stop reason: HOSPADM

## 2019-08-06 RX ADMIN — INSULIN LISPRO 5 UNITS: 100 INJECTION, SOLUTION INTRAVENOUS; SUBCUTANEOUS at 17:36

## 2019-08-06 RX ADMIN — INSULIN LISPRO 3 UNITS: 100 INJECTION, SOLUTION INTRAVENOUS; SUBCUTANEOUS at 21:05

## 2019-08-06 RX ADMIN — CARVEDILOL 12.5 MG: 6.25 TABLET, FILM COATED ORAL at 08:22

## 2019-08-06 RX ADMIN — INSULIN LISPRO 9 UNITS: 100 INJECTION, SOLUTION INTRAVENOUS; SUBCUTANEOUS at 11:54

## 2019-08-06 RX ADMIN — VANCOMYCIN HYDROCHLORIDE 1250 MG: 10 INJECTION, POWDER, LYOPHILIZED, FOR SOLUTION INTRAVENOUS at 22:31

## 2019-08-06 RX ADMIN — Medication 10 ML: at 21:02

## 2019-08-06 RX ADMIN — PAROXETINE HYDROCHLORIDE 10 MG: 20 TABLET, FILM COATED ORAL at 08:22

## 2019-08-06 RX ADMIN — INSULIN LISPRO 5 UNITS: 100 INJECTION, SOLUTION INTRAVENOUS; SUBCUTANEOUS at 08:38

## 2019-08-06 RX ADMIN — INSULIN GLARGINE 20 UNITS: 100 INJECTION, SOLUTION SUBCUTANEOUS at 21:05

## 2019-08-06 RX ADMIN — Medication 10 ML: at 11:51

## 2019-08-06 RX ADMIN — INSULIN GLARGINE 10 UNITS: 100 INJECTION, SOLUTION SUBCUTANEOUS at 08:37

## 2019-08-06 RX ADMIN — IBUPROFEN 400 MG: 400 TABLET ORAL at 08:21

## 2019-08-06 RX ADMIN — SIMVASTATIN 20 MG: 10 TABLET, FILM COATED ORAL at 21:01

## 2019-08-06 RX ADMIN — GABAPENTIN 600 MG: 300 CAPSULE ORAL at 08:21

## 2019-08-06 RX ADMIN — Medication 10 ML: at 21:01

## 2019-08-06 RX ADMIN — VANCOMYCIN HYDROCHLORIDE 1250 MG: 10 INJECTION, POWDER, LYOPHILIZED, FOR SOLUTION INTRAVENOUS at 05:58

## 2019-08-06 RX ADMIN — PIPERACILLIN AND TAZOBACTAM 3.38 G: 3; .375 INJECTION, POWDER, FOR SOLUTION INTRAVENOUS at 00:59

## 2019-08-06 RX ADMIN — IBUPROFEN 400 MG: 400 TABLET ORAL at 18:11

## 2019-08-06 RX ADMIN — PIPERACILLIN AND TAZOBACTAM 3.38 G: 3; .375 INJECTION, POWDER, FOR SOLUTION INTRAVENOUS at 08:22

## 2019-08-06 RX ADMIN — INSULIN LISPRO 9 UNITS: 100 INJECTION, SOLUTION INTRAVENOUS; SUBCUTANEOUS at 17:38

## 2019-08-06 RX ADMIN — TIZANIDINE 4 MG: 4 TABLET ORAL at 21:01

## 2019-08-06 RX ADMIN — GEMFIBROZIL 600 MG: 600 TABLET ORAL at 07:25

## 2019-08-06 RX ADMIN — GABAPENTIN 600 MG: 300 CAPSULE ORAL at 21:01

## 2019-08-06 RX ADMIN — Medication 10 ML: at 08:37

## 2019-08-06 RX ADMIN — GEMFIBROZIL 600 MG: 600 TABLET ORAL at 17:37

## 2019-08-06 RX ADMIN — CARVEDILOL 12.5 MG: 6.25 TABLET, FILM COATED ORAL at 17:36

## 2019-08-06 RX ADMIN — VANCOMYCIN HYDROCHLORIDE 1250 MG: 10 INJECTION, POWDER, LYOPHILIZED, FOR SOLUTION INTRAVENOUS at 14:38

## 2019-08-06 RX ADMIN — FUROSEMIDE 20 MG: 20 TABLET ORAL at 08:22

## 2019-08-06 RX ADMIN — PIPERACILLIN AND TAZOBACTAM 3.38 G: 3; .375 INJECTION, POWDER, FOR SOLUTION INTRAVENOUS at 17:35

## 2019-08-06 RX ADMIN — GLIMEPIRIDE 4 MG: 2 TABLET ORAL at 08:21

## 2019-08-06 RX ADMIN — GABAPENTIN 600 MG: 300 CAPSULE ORAL at 14:38

## 2019-08-06 RX ADMIN — INSULIN LISPRO 5 UNITS: 100 INJECTION, SOLUTION INTRAVENOUS; SUBCUTANEOUS at 11:54

## 2019-08-06 RX ADMIN — INSULIN LISPRO 9 UNITS: 100 INJECTION, SOLUTION INTRAVENOUS; SUBCUTANEOUS at 08:38

## 2019-08-06 ASSESSMENT — PAIN SCALES - GENERAL
PAINLEVEL_OUTOF10: 5
PAINLEVEL_OUTOF10: 0
PAINLEVEL_OUTOF10: 3
PAINLEVEL_OUTOF10: 0

## 2019-08-06 ASSESSMENT — PAIN DESCRIPTION - LOCATION
LOCATION: BACK
LOCATION: BACK

## 2019-08-06 ASSESSMENT — PAIN DESCRIPTION - PROGRESSION: CLINICAL_PROGRESSION: NOT CHANGED

## 2019-08-06 ASSESSMENT — PAIN DESCRIPTION - ONSET: ONSET: ON-GOING

## 2019-08-06 ASSESSMENT — PAIN DESCRIPTION - DESCRIPTORS
DESCRIPTORS: ACHING
DESCRIPTORS: ACHING

## 2019-08-06 ASSESSMENT — PAIN DESCRIPTION - PAIN TYPE
TYPE: CHRONIC PAIN
TYPE: CHRONIC PAIN

## 2019-08-06 ASSESSMENT — PAIN DESCRIPTION - FREQUENCY: FREQUENCY: CONTINUOUS

## 2019-08-07 VITALS
RESPIRATION RATE: 16 BRPM | HEART RATE: 98 BPM | TEMPERATURE: 98.6 F | SYSTOLIC BLOOD PRESSURE: 138 MMHG | OXYGEN SATURATION: 99 % | HEIGHT: 72 IN | BODY MASS INDEX: 36.3 KG/M2 | WEIGHT: 268 LBS | DIASTOLIC BLOOD PRESSURE: 92 MMHG

## 2019-08-07 LAB
ANION GAP SERPL CALCULATED.3IONS-SCNC: 12 MMOL/L (ref 3–16)
BUN BLDV-MCNC: 16 MG/DL (ref 7–20)
CALCIUM SERPL-MCNC: 9.3 MG/DL (ref 8.3–10.6)
CHLORIDE BLD-SCNC: 101 MMOL/L (ref 99–110)
CO2: 24 MMOL/L (ref 21–32)
CREAT SERPL-MCNC: 0.7 MG/DL (ref 0.9–1.3)
GFR AFRICAN AMERICAN: >60
GFR NON-AFRICAN AMERICAN: >60
GLUCOSE BLD-MCNC: 231 MG/DL (ref 70–99)
GLUCOSE BLD-MCNC: 234 MG/DL (ref 70–99)
GLUCOSE BLD-MCNC: 244 MG/DL (ref 70–99)
GLUCOSE BLD-MCNC: 310 MG/DL (ref 70–99)
GRAM STAIN RESULT: ABNORMAL
PERFORMED ON: ABNORMAL
POTASSIUM SERPL-SCNC: 4.1 MMOL/L (ref 3.5–5.1)
SODIUM BLD-SCNC: 137 MMOL/L (ref 136–145)
WOUND/ABSCESS: ABNORMAL

## 2019-08-07 PROCEDURE — 2580000003 HC RX 258: Performed by: PHYSICIAN ASSISTANT

## 2019-08-07 PROCEDURE — 6370000000 HC RX 637 (ALT 250 FOR IP): Performed by: INTERNAL MEDICINE

## 2019-08-07 PROCEDURE — 99239 HOSP IP/OBS DSCHRG MGMT >30: CPT | Performed by: INTERNAL MEDICINE

## 2019-08-07 PROCEDURE — 6360000002 HC RX W HCPCS: Performed by: PHYSICIAN ASSISTANT

## 2019-08-07 PROCEDURE — 6360000002 HC RX W HCPCS: Performed by: INTERNAL MEDICINE

## 2019-08-07 PROCEDURE — 99253 IP/OBS CNSLTJ NEW/EST LOW 45: CPT | Performed by: INTERNAL MEDICINE

## 2019-08-07 PROCEDURE — 2580000003 HC RX 258: Performed by: INTERNAL MEDICINE

## 2019-08-07 PROCEDURE — 36415 COLL VENOUS BLD VENIPUNCTURE: CPT

## 2019-08-07 PROCEDURE — 6370000000 HC RX 637 (ALT 250 FOR IP): Performed by: PHYSICIAN ASSISTANT

## 2019-08-07 PROCEDURE — 80048 BASIC METABOLIC PNL TOTAL CA: CPT

## 2019-08-07 RX ORDER — METRONIDAZOLE 250 MG/1
500 TABLET ORAL 4 TIMES DAILY
Status: DISCONTINUED | OUTPATIENT
Start: 2019-08-07 | End: 2019-08-07 | Stop reason: HOSPADM

## 2019-08-07 RX ADMIN — Medication 10 ML: at 08:07

## 2019-08-07 RX ADMIN — INSULIN LISPRO 5 UNITS: 100 INJECTION, SOLUTION INTRAVENOUS; SUBCUTANEOUS at 08:15

## 2019-08-07 RX ADMIN — VANCOMYCIN HYDROCHLORIDE 1250 MG: 10 INJECTION, POWDER, LYOPHILIZED, FOR SOLUTION INTRAVENOUS at 13:39

## 2019-08-07 RX ADMIN — VANCOMYCIN HYDROCHLORIDE 1250 MG: 10 INJECTION, POWDER, LYOPHILIZED, FOR SOLUTION INTRAVENOUS at 07:00

## 2019-08-07 RX ADMIN — INSULIN LISPRO 5 UNITS: 100 INJECTION, SOLUTION INTRAVENOUS; SUBCUTANEOUS at 11:57

## 2019-08-07 RX ADMIN — IBUPROFEN 400 MG: 400 TABLET ORAL at 13:39

## 2019-08-07 RX ADMIN — PIPERACILLIN AND TAZOBACTAM 3.38 G: 3; .375 INJECTION, POWDER, FOR SOLUTION INTRAVENOUS at 00:45

## 2019-08-07 RX ADMIN — GLIMEPIRIDE 4 MG: 2 TABLET ORAL at 08:06

## 2019-08-07 RX ADMIN — GEMFIBROZIL 600 MG: 600 TABLET ORAL at 07:00

## 2019-08-07 RX ADMIN — INSULIN LISPRO 6 UNITS: 100 INJECTION, SOLUTION INTRAVENOUS; SUBCUTANEOUS at 08:15

## 2019-08-07 RX ADMIN — Medication 10 ML: at 08:06

## 2019-08-07 RX ADMIN — PAROXETINE HYDROCHLORIDE 10 MG: 20 TABLET, FILM COATED ORAL at 08:06

## 2019-08-07 RX ADMIN — PIPERACILLIN AND TAZOBACTAM 3.38 G: 3; .375 INJECTION, POWDER, FOR SOLUTION INTRAVENOUS at 08:07

## 2019-08-07 RX ADMIN — INSULIN GLARGINE 10 UNITS: 100 INJECTION, SOLUTION SUBCUTANEOUS at 08:15

## 2019-08-07 RX ADMIN — DEXTROSE MONOHYDRATE 2 G: 5 INJECTION INTRAVENOUS at 14:21

## 2019-08-07 RX ADMIN — GABAPENTIN 600 MG: 300 CAPSULE ORAL at 08:06

## 2019-08-07 RX ADMIN — GABAPENTIN 600 MG: 300 CAPSULE ORAL at 13:39

## 2019-08-07 RX ADMIN — INSULIN LISPRO 12 UNITS: 100 INJECTION, SOLUTION INTRAVENOUS; SUBCUTANEOUS at 11:57

## 2019-08-07 RX ADMIN — FUROSEMIDE 20 MG: 20 TABLET ORAL at 08:06

## 2019-08-07 RX ADMIN — CARVEDILOL 12.5 MG: 6.25 TABLET, FILM COATED ORAL at 08:06

## 2019-08-07 ASSESSMENT — PAIN DESCRIPTION - LOCATION: LOCATION: BACK

## 2019-08-07 ASSESSMENT — PAIN DESCRIPTION - DESCRIPTORS: DESCRIPTORS: ACHING

## 2019-08-07 ASSESSMENT — PAIN SCALES - GENERAL
PAINLEVEL_OUTOF10: 0
PAINLEVEL_OUTOF10: 4
PAINLEVEL_OUTOF10: 0

## 2019-08-07 ASSESSMENT — PAIN DESCRIPTION - PAIN TYPE: TYPE: ACUTE PAIN

## 2019-08-07 NOTE — DISCHARGE SUMMARY
Name:  Kathy Console  Room:  4835/6895-83  MRN:    [id number]    Discharge Summary      This discharge summary is in conjunction with a complete physical exam done on the day of discharge. Discharging Physician: Dr. Kortney Crump: 8/1/2019  Discharge:   8/7/2019    HPI taken from admission H&P:    The patient is a 41 y.o. male with uncontrolled DM2, diabetic neuropathy, HTN, obesity who presented to  PCP office with complaint of right great toe swelling. He has had a callus in the area for about 1 month. He thought it was healing, therefore he stopped watching it, then noticed significant worsening over the past 5 days with redness spreading to the foot.  Fever to 102. No nausea or vomiting. Has severe neuropathy and can't feel his feet.      Exam in the clinic was consistent with diabetic foot ulcer with cellulitis and concern for underlying osteomyelitis.  He was directly admitted to [facility] for IV abx, XR, and podiatry c/s      Diagnoses this Admission and Hospital Course    Right great toe diabetic ulcer with cellulitis   - with concern for underlying osteomyelitis, yet XR does not reveal any evidence of OM  - IV Vanc and zosyn D#6  - podiatry c/s- pending   - for MRI foot -Findings compatible with acute osteomyelitis of the 1st distal phalanx andproximal phalanx head. There is a fracture of the 2nd proximal phalanx with associated marrow changes.  If there is an adjacent contiguous soft tissue ulcer, the marrowchanges could represent osteomyelitis with pathologic fracture.    - ID consult  - Podiatry recommending 6 weeks of IV Abx--D/c home with [facility]   - Place PICC  - Cultures negative so far     DM2  - uncontrolled with hyperglycemia and severe peripheral neuropathy  -  A1c 9.3%  - cont home meds and insulins, add SSI  - cont neurontin     History of non ischemic CMP  - 2014, improved EF on f/u echo  - cont home meds     HTN  - controlled continue home meds      Obesity  - Recommended weight loss     Anxiety  - paxil     Procedures (Please Review Full Report for Details)  PICC line    Consults    Podiatry   ID    Physical Exam at Discharge:    BP (!) 140/93   Pulse 85   Temp 97.3 °F (36.3 °C) (Oral)   Resp 16   Ht 6' (1.829 m)   Wt 268 lb (121.6 kg)   SpO2 99%   BMI 36.35 kg/m²     See progress note for PE    BMP:   Recent Labs     08/05/19  0552 08/06/19  0548 08/07/19  0522   * 134* 137   K 3.9 4.2 4.1   CL 96* 98* 101   CO2 23 24 24   BUN 11 12 16   CREATININE 0.7* 0.7* 0.7*       CULTURES  Wound Cx: Mixed skin keith   Blood Cx: NGTD    RADIOLOGY  MRI FOOT RIGHT WO CONTRAST   Final Result   Findings compatible with acute osteomyelitis of the 1st distal phalanx and   proximal phalanx head. There is a fracture of the 2nd proximal phalanx with associated marrow   changes. If there is an adjacent contiguous soft tissue ulcer, the marrow   changes could represent osteomyelitis with pathologic fracture. Correlate   clinically. XR TOE RIGHT (MIN 2 VIEWS)   Final Result   Soft tissue edema of the 1st digit. No radiographic evidence of   osteomyelitis. IR PICC WO SQ PORT/PUMP > 5 YEARS    (Results Pending)       Discharge Medications     Medication List      CONTINUE taking these medications    * blood glucose test strips strip  Commonly known as:  ASCENSIA AUTODISC VI;ONE TOUCH ULTRA TEST VI  1 each by In Vitro route daily. As needed. * blood glucose test strips strip  Commonly known as:  ASCENSIA AUTODISC VI;ONE TOUCH ULTRA TEST VI  Test threes time a day. DX: E11.9     carvedilol 12.5 MG tablet  Commonly known as:  COREG  Take 1 tablet by mouth 2 times daily (with meals)     Empagliflozin-metFORMIN HCl ER 5-1000 MG Tb24  Take 1 tablet by mouth daily     furosemide 20 MG tablet  Commonly known as:  LASIX  Take 1 tablet by mouth daily     gabapentin 300 MG capsule  Commonly known as:  NEURONTIN  Take 2 capsules by mouth 3 times daily for 30 days.

## 2019-08-07 NOTE — PLAN OF CARE
Problem: Infection:  Goal: Will remain free from infection  Description  Will remain free from infection  Outcome: Ongoing     Problem: Safety:  Goal: Free from accidental physical injury  Description  Free from accidental physical injury  Outcome: Ongoing  Goal: Free from intentional harm  Description  Free from intentional harm  Outcome: Ongoing     Problem: Daily Care:  Goal: Daily care needs are met  Description  Daily care needs are met  Outcome: Ongoing     Problem: Pain:  Goal: Patient's pain/discomfort is manageable  Description  Patient's pain/discomfort is manageable  Outcome: Ongoing
continuum of care needs are met  8/2/2019 1313 by Maritza Chou RN  Outcome: Ongoing  8/2/2019 1121 by Maritza Chou RN  Outcome: Ongoing

## 2019-08-07 NOTE — PROGRESS NOTES
Bedside report given to Kodak Keith [facility]. Pt denies need at this time. Call light within reach.
Bedside report given to Wally Alcocer. Pt denies needs at this time. Call light within reach.
Consult has been called to Dr. Perez Cardenas on 8/1/19. Arsenio served dr ramirez.  2:11 PM    to Camps  8/1/2019
PHARMACY NOTE  Silvestre Miller was ordered Empagliflozin-Metformin ER. Per the [address], this medication is non-formulary and not stocked by pharmacy. Patient's diabetes is being managed with Glimepiride, Lantus, and Humalog scheduled and sliding scale. The medication can be reordered at discharge.    STERLING LoPh.8/5/20195:18 PM
Podiatric Surgery      Subjective:     Patient seen at bedside this evening. He denies any pain in the toe. He thinks that the toe is looking better. He has decided that he would like to try IV antibiotic treatment for 6 weeks versus hallux amputation.       Objective:   Scheduled Meds:   insulin lispro  0-18 Units Subcutaneous TID WC    insulin lispro  0-9 Units Subcutaneous Nightly    insulin glargine  10 Units Subcutaneous QAM AC    vancomycin  1,250 mg Intravenous Q8H    docusate sodium  100 mg Oral Daily    insulin glargine  20 Units Subcutaneous Nightly    carvedilol  12.5 mg Oral BID WC    furosemide  20 mg Oral Daily    gabapentin  600 mg Oral TID    gemfibrozil  600 mg Oral BID AC    glimepiride  4 mg Oral QAM AC    insulin lispro  5 Units Subcutaneous TID AC    PARoxetine  10 mg Oral QAM    simvastatin  20 mg Oral Nightly    sodium chloride flush  10 mL Intravenous 2 times per day    enoxaparin  40 mg Subcutaneous Daily    piperacillin-tazobactam  3.375 g Intravenous Q8H    tiZANidine  4 mg Oral Nightly     Continuous Infusions:   dextrose       PRN Meds:.acetaminophen, glucose, dextrose, glucagon (rDNA), dextrose, sodium chloride flush, magnesium hydroxide, ondansetron, ibuprofen    CBC with Differential:    Lab Results   Component Value Date    WBC 7.6 08/03/2019    RBC 4.06 08/03/2019    HGB 13.3 08/03/2019    HCT 38.0 08/03/2019     08/03/2019    MCV 93.6 08/03/2019    MCH 32.7 08/03/2019    MCHC 35.0 08/03/2019    RDW 12.8 08/03/2019    SEGSPCT 65.0 02/17/2015    LYMPHOPCT 21.8 08/03/2019    MONOPCT 15.0 08/03/2019    BASOPCT 0.7 08/03/2019    MONOSABS 1.1 08/03/2019    LYMPHSABS 1.7 08/03/2019    EOSABS 0.2 08/03/2019    BASOSABS 0.1 08/03/2019     BMP:    Lab Results   Component Value Date     08/05/2019    K 3.9 08/05/2019    K 3.9 08/02/2019    CL 96 08/05/2019    CO2 23 08/05/2019    BUN 11 08/05/2019    LABALBU 3.9 08/01/2019    CREATININE 0.7 08/05/2019
Prescriptions and discharge instructions given. Verbal and written education provided on: new medications (flagyl, rocephin), follow-up appointments, s/s to report to physician, diet, and activity. Written & verbal education provided on diabetic foot ulcer. Pt verbalized understanding denies any questions/ needs at this time. PIV removed from RFA, no complications, catheter intact, pressure held for 2 mins, and dressing applied that is CDI. Pt dc/d with PICC in [place] [address]. Pt is stable for discharge at this time. All belongings are with patient at discharge. Transport called to transport pt to vehicle with wife, for discharge home.
Pt awake in bed. PM assessment complete. Pt denies pain at this time. C/o \"sweats\". Temp was 100.6. Recheck at this time 99.2. No needs voiced. Call light in reach. Will cont to monitor.  Alphonse Shearer
Pt in bed resting comfortably and appears in no distress. Pt denies any needs at this time. Will continue to monitor. Call light within reach. Bedside report given to Ary DIAZ and Heaven Aponte RN for transfer of care.
Pt in bed resting comfortably and appears in no distress. Pt denies any needs at this time. Will continue to monitor. Call light within reach. Bedside report given to [facility] for transfer of care.
Pt resting in bed awake. Initial assessment completed. See flowsheet. No s/s of distress. Pt denies needs at this time. Call light within reach. Cont to monitor.
Pt resting in bed. No complaints at this time. fsbs 310 and insulin will be given. Call light within reach. Will monitor.
antibiotic therapy  Osteomyelitis of R great toe. Diabetic foot infection       Plan: Will discuss discharge plan with patient. Some of the options for post hospital care may not be affordable for the patient . My inclination would be to insert PICC and and treat with IV Rocephin and possibly po Flagyl for 4-6 weeks. Pt was told not to drink alcohol while taking Flagyl. I think MRSA would have grown from culture if it had been present. Recommended Labs or Other Treatments After Discharge:    ROCEPHIN 2 GMS Q24H IVPB X 4 WEEKS THEN CALL DR. PASCUAL FOR MORE ORDERS: [phone]    CBC WITH DIFF, CREATININE, CRP WEEKLY: FAX ALL RESULTS [phone]    PT TO MAKE APPOINTMENT TO SEE DR. PASCUAL IN THE OFFICE 2 WEEKS FROM NEXT Monday: [phone]    Call Dr. [facility] for fever, rash, vomiting or diarrhea : [phone]    Electronically signed by Sergey Allen MD on 8/7/2019 at 2:06 PM    RX for Flagyl 500 mg #80 placed on chart. May consider switch to Augmentin at some point.
dextrose, glucagon (rDNA), dextrose, sodium chloride flush, magnesium hydroxide, ondansetron, ibuprofen      Data:  CBC:   Recent Labs     08/01/19  1421 08/02/19  0630 08/03/19  0341   WBC 10.7 15.3* 7.6   HGB 14.7 14.2 13.3*   HCT 42.1 41.2 38.0*   MCV 94.1 92.5 93.6    246 203     BMP:   Recent Labs     08/01/19  1420 08/02/19  0630 08/03/19  0340   * 129* 131*   K 4.0 3.9 3.9   CL 94* 93* 95*   CO2 26 24 26   BUN 12 13 14   CREATININE 0.7* 0.6* 0.7*     LIVER PROFILE:   Recent Labs     08/01/19  1420   AST 16   ALT 20   BILITOT 1.9*   ALKPHOS 128     PT/INR: No results for input(s): PROTIME, INR in the last 72 hours. CULTURES  Blood: pending      RADIOLOGY  XR TOE RIGHT (MIN 2 VIEWS)   Final Result   Soft tissue edema of the 1st digit. No radiographic evidence of   osteomyelitis.            Assessment/Plan:       #Right great toe diabetic ulcer with cellulitis   - with concern for underlying osteomyelitis, yet XR does not reveal any evidence of OM  - IV Vanc and zosyn D#3  - podiatry c/s- pending   - for MRI foot today     #DM2  - uncontrolled with hyperglycemia and severe peripheral neuropathy  -  A1c 9.3%  - cont home meds and insulins, add SSI  - cont neurontin     #History of non ischemic CMP  - 2014, improved EF on f/u echo  - cont home meds     #HTN  - controlled continue home meds      #Obesity  - Recommended weight loss     #Anxiety  - paxil      DVT Prophylaxis: Lovenox   Diet: DIET CARB CONTROL;  Code Status: Full Code      Maria Guadalupe Otoole MD 8/3/2019 8:03 AM

## 2019-08-07 NOTE — CONSULTS
of  cultures. Silvestre Roa MD    D: 08/06/2019 16:28:46       T: 08/06/2019 16:35:21     [email or web address]  Job#: [id number]     Doc#: [id number]    CC:    Addendum: I did see the R great toe wound on 8/6. Moderate swelling erythema of the toe. Wound over dorso medial aspect.  No purulence could be expressed Have some doubts as to whether this osteomyelitis can be cured with antibiotics but patient wants to try to resolve the problem without amputation if possible

## 2019-08-08 ENCOUNTER — TELEPHONE (OUTPATIENT)
Dept: INTERNAL MEDICINE CLINIC | Age: 44
End: 2019-08-08

## 2019-08-08 ENCOUNTER — HOSPITAL ENCOUNTER (OUTPATIENT)
Dept: WOUND CARE | Age: 44
Discharge: HOME | End: 2019-08-08
Payer: COMMERCIAL

## 2019-08-08 VITALS
WEIGHT: 267.4 LBS | BODY MASS INDEX: 36.22 KG/M2 | SYSTOLIC BLOOD PRESSURE: 130 MMHG | HEART RATE: 108 BPM | RESPIRATION RATE: 18 BRPM | TEMPERATURE: 97.9 F | DIASTOLIC BLOOD PRESSURE: 95 MMHG | HEIGHT: 72 IN

## 2019-08-08 PROCEDURE — 99232 SBSQ HOSP IP/OBS MODERATE 35: CPT | Performed by: INTERNAL MEDICINE

## 2019-08-08 PROCEDURE — 99212 OFFICE O/P EST SF 10 MIN: CPT

## 2019-08-08 PROCEDURE — 11043 DBRDMT MUSC&/FSCA 1ST 20/<: CPT

## 2019-08-08 RX ORDER — METRONIDAZOLE 500 MG/1
500 TABLET ORAL 4 TIMES DAILY
Status: ON HOLD | COMMUNITY
End: 2019-08-26 | Stop reason: HOSPADM

## 2019-08-08 RX ORDER — FUROSEMIDE 20 MG/1
20 TABLET ORAL DAILY
Qty: 30 TABLET | Refills: 0 | Status: SHIPPED | OUTPATIENT
Start: 2019-08-08 | End: 2019-08-22 | Stop reason: SDUPTHER

## 2019-08-08 RX ORDER — CEFTRIAXONE 2 G/1
2 INJECTION, POWDER, FOR SOLUTION INTRAMUSCULAR; INTRAVENOUS EVERY 24 HOURS
Status: ON HOLD | COMMUNITY
End: 2019-08-26 | Stop reason: HOSPADM

## 2019-08-08 RX ORDER — LIDOCAINE 40 MG/G
CREAM TOPICAL ONCE
Status: DISCONTINUED | OUTPATIENT
Start: 2019-08-08 | End: 2019-08-09 | Stop reason: HOSPADM

## 2019-08-08 ASSESSMENT — PAIN SCALES - GENERAL: PAINLEVEL_OUTOF10: 0

## 2019-08-08 NOTE — PROGRESS NOTES
tablet 2    simvastatin (ZOCOR) 20 MG tablet TAKE ONE TABLET BY MOUTH ONCE NIGHTLY 30 tablet 2    sildenafil (VIAGRA) 100 MG tablet Take 1 tablet by mouth as needed for Erectile Dysfunction 15 tablet 0    Empagliflozin-metFORMIN HCl ER 5-1000 MG TB24 Take 1 tablet by mouth daily 90 tablet 0    Lancets MISC Test three times a day with Contour Next EZ machine. DX: E11.9 100 each 11    Insulin Syringe-Needle U-100 (PoshlyCO INS SYR .3CC/29GX0.5\") 29G X 1/2\" 0.3 ML MISC 1 each by Does not apply route daily. 100 each 3    glucose blood VI test strips (EXACTECH TEST) strip 1 each by In Vitro route daily. As needed. 100 each 0     No current facility-administered medications on file prior to encounter. REVIEW OF SYSTEMS    Pertinent items are noted in HPI. Objective:      BP (!) 130/95   Pulse 108   Temp 97.9 °F (36.6 °C) (Oral)   Resp 18   Ht 6' (1.829 m)   Wt 267 lb 6.4 oz (121.3 kg)   BMI 36.27 kg/m²     PHYSICAL EXAM    Vascular: Vascular status Intact  palpable pedal pulses, right DP2/4 and PT2/4, left DP2/4 and PT2/4. CFT 2 seconds digits 1 to 5 bilateral.  Hair growthAbsent  both lower extremities and feet. Skin temperature is warm to warm from pretibial area to distal digits bilateral.  Exam is negative for rubor, pallor, cyanosis or signs of acute vascular compromise bilaterally. Exam is positive for edema bilateral lower extremity. Varicosities Present bilateral lower extremity. Neuro: Neurologic status diminished bilateral with epicritic Absent  , proprioceptive Absent , vibratory sensationAbsent  and protopathic Absent. DTRs Present bilateral Achilles. There were no reproducible neuritic symptoms on exam bilateral feet/ankles. Derm: Ulceration to right Hallux. Ecchymosis Absent  bilateral feet/foot. Musculoskeletal: No pain with debridement wound 5/5 muscle strength in/eversion and dorsi/plantarflexion bilateral feet. No gross instability noted.             Assessment:

## 2019-08-13 ENCOUNTER — HOSPITAL ENCOUNTER (OUTPATIENT)
Age: 44
Setting detail: SPECIMEN
Discharge: HOME | End: 2019-08-13
Payer: COMMERCIAL

## 2019-08-13 LAB
BASOPHILS ABSOLUTE: 0.1 K/UL (ref 0–0.2)
BASOPHILS RELATIVE PERCENT: 0.8 %
C-REACTIVE PROTEIN: 39.5 MG/L (ref 0–5.1)
CREAT SERPL-MCNC: 0.6 MG/DL (ref 0.9–1.3)
EOSINOPHILS ABSOLUTE: 0.3 K/UL (ref 0–0.6)
EOSINOPHILS RELATIVE PERCENT: 2.5 %
GFR AFRICAN AMERICAN: >60
GFR NON-AFRICAN AMERICAN: >60
HCT VFR BLD CALC: 39.5 % (ref 40.5–52.5)
HEMOGLOBIN: 13.6 G/DL (ref 13.5–17.5)
LYMPHOCYTES ABSOLUTE: 2.4 K/UL (ref 1–5.1)
LYMPHOCYTES RELATIVE PERCENT: 19.2 %
MCH RBC QN AUTO: 32.3 PG (ref 26–34)
MCHC RBC AUTO-ENTMCNC: 34.4 G/DL (ref 31–36)
MCV RBC AUTO: 93.8 FL (ref 80–100)
MONOCYTES ABSOLUTE: 1.2 K/UL (ref 0–1.3)
MONOCYTES RELATIVE PERCENT: 9.6 %
NEUTROPHILS ABSOLUTE: 8.3 K/UL (ref 1.7–7.7)
NEUTROPHILS RELATIVE PERCENT: 67.9 %
PDW BLD-RTO: 13.2 % (ref 12.4–15.4)
PLATELET # BLD: 253 K/UL (ref 135–450)
PMV BLD AUTO: 8.4 FL (ref 5–10.5)
RBC # BLD: 4.21 M/UL (ref 4.2–5.9)
WBC # BLD: 12.2 K/UL (ref 4–11)

## 2019-08-13 PROCEDURE — 82565 ASSAY OF CREATININE: CPT

## 2019-08-13 PROCEDURE — 85025 COMPLETE CBC W/AUTO DIFF WBC: CPT

## 2019-08-13 PROCEDURE — 86140 C-REACTIVE PROTEIN: CPT

## 2019-08-13 PROCEDURE — 36415 COLL VENOUS BLD VENIPUNCTURE: CPT

## 2019-08-15 ENCOUNTER — HOSPITAL ENCOUNTER (OUTPATIENT)
Dept: WOUND CARE | Age: 44
Discharge: HOME | End: 2019-08-15
Payer: COMMERCIAL

## 2019-08-15 VITALS
BODY MASS INDEX: 36.3 KG/M2 | RESPIRATION RATE: 18 BRPM | WEIGHT: 268 LBS | SYSTOLIC BLOOD PRESSURE: 141 MMHG | DIASTOLIC BLOOD PRESSURE: 96 MMHG | TEMPERATURE: 97.9 F | HEIGHT: 72 IN | HEART RATE: 99 BPM

## 2019-08-15 PROCEDURE — 11043 DBRDMT MUSC&/FSCA 1ST 20/<: CPT

## 2019-08-15 RX ORDER — LIDOCAINE 40 MG/G
CREAM TOPICAL ONCE
Status: DISCONTINUED | OUTPATIENT
Start: 2019-08-15 | End: 2019-08-16 | Stop reason: HOSPADM

## 2019-08-15 ASSESSMENT — PAIN SCALES - GENERAL: PAINLEVEL_OUTOF10: 0

## 2019-08-20 ENCOUNTER — HOSPITAL ENCOUNTER (OUTPATIENT)
Age: 44
Setting detail: SPECIMEN
Discharge: HOME | DRG: 617 | End: 2019-08-20
Payer: COMMERCIAL

## 2019-08-20 DIAGNOSIS — E11.42 DIABETIC PERIPHERAL NEUROPATHY ASSOCIATED WITH TYPE 2 DIABETES MELLITUS (HCC): ICD-10-CM

## 2019-08-20 LAB
BASOPHILS ABSOLUTE: 0.2 K/UL (ref 0–0.2)
BASOPHILS RELATIVE PERCENT: 1.8 %
CREAT SERPL-MCNC: 0.6 MG/DL (ref 0.9–1.3)
EOSINOPHILS ABSOLUTE: 0.4 K/UL (ref 0–0.6)
EOSINOPHILS RELATIVE PERCENT: 3.6 %
GFR AFRICAN AMERICAN: >60
GFR NON-AFRICAN AMERICAN: >60
HCT VFR BLD CALC: 44.4 % (ref 40.5–52.5)
HEMOGLOBIN: 15.3 G/DL (ref 13.5–17.5)
LYMPHOCYTES ABSOLUTE: 2.7 K/UL (ref 1–5.1)
LYMPHOCYTES RELATIVE PERCENT: 27.6 %
MCH RBC QN AUTO: 32.3 PG (ref 26–34)
MCHC RBC AUTO-ENTMCNC: 34.5 G/DL (ref 31–36)
MCV RBC AUTO: 93.7 FL (ref 80–100)
MONOCYTES ABSOLUTE: 0.8 K/UL (ref 0–1.3)
MONOCYTES RELATIVE PERCENT: 7.9 %
NEUTROPHILS ABSOLUTE: 5.9 K/UL (ref 1.7–7.7)
NEUTROPHILS RELATIVE PERCENT: 59.1 %
PDW BLD-RTO: 13 % (ref 12.4–15.4)
PLATELET # BLD: 284 K/UL (ref 135–450)
PMV BLD AUTO: 8.3 FL (ref 5–10.5)
RBC # BLD: 4.74 M/UL (ref 4.2–5.9)
WBC # BLD: 9.9 K/UL (ref 4–11)

## 2019-08-20 PROCEDURE — 86140 C-REACTIVE PROTEIN: CPT

## 2019-08-20 PROCEDURE — 82565 ASSAY OF CREATININE: CPT

## 2019-08-20 PROCEDURE — 85025 COMPLETE CBC W/AUTO DIFF WBC: CPT

## 2019-08-20 PROCEDURE — 36415 COLL VENOUS BLD VENIPUNCTURE: CPT

## 2019-08-20 RX ORDER — GABAPENTIN 300 MG/1
600 CAPSULE ORAL 3 TIMES DAILY
Qty: 180 CAPSULE | Refills: 0 | Status: SHIPPED | OUTPATIENT
Start: 2019-08-21 | End: 2019-09-11 | Stop reason: DRUGHIGH

## 2019-08-21 LAB — C-REACTIVE PROTEIN: 12.4 MG/L (ref 0–5.1)

## 2019-08-22 ENCOUNTER — OFFICE VISIT (OUTPATIENT)
Dept: INTERNAL MEDICINE CLINIC | Age: 44
End: 2019-08-22

## 2019-08-22 ENCOUNTER — APPOINTMENT (OUTPATIENT)
Dept: GENERAL RADIOLOGY | Age: 44
DRG: 617 | End: 2019-08-22
Attending: INTERNAL MEDICINE
Payer: COMMERCIAL

## 2019-08-22 ENCOUNTER — HOSPITAL ENCOUNTER (OUTPATIENT)
Dept: WOUND CARE | Age: 44
Discharge: HOME | DRG: 617 | End: 2019-08-22
Payer: COMMERCIAL

## 2019-08-22 ENCOUNTER — HOSPITAL ENCOUNTER (INPATIENT)
Age: 44
LOS: 4 days | Discharge: HOME HEALTH | DRG: 617 | End: 2019-08-26
Attending: INTERNAL MEDICINE | Admitting: INTERNAL MEDICINE
Payer: COMMERCIAL

## 2019-08-22 VITALS
SYSTOLIC BLOOD PRESSURE: 110 MMHG | HEART RATE: 90 BPM | WEIGHT: 268 LBS | HEIGHT: 72 IN | BODY MASS INDEX: 36.3 KG/M2 | DIASTOLIC BLOOD PRESSURE: 70 MMHG | TEMPERATURE: 97.7 F

## 2019-08-22 VITALS
DIASTOLIC BLOOD PRESSURE: 80 MMHG | SYSTOLIC BLOOD PRESSURE: 111 MMHG | HEART RATE: 104 BPM | TEMPERATURE: 98.3 F | RESPIRATION RATE: 16 BRPM

## 2019-08-22 DIAGNOSIS — Z79.4 TYPE 2 DIABETES MELLITUS WITH DIABETIC POLYNEUROPATHY, WITH LONG-TERM CURRENT USE OF INSULIN (HCC): Primary | ICD-10-CM

## 2019-08-22 DIAGNOSIS — Z79.4 TYPE 2 DIABETES MELLITUS WITH DIABETIC POLYNEUROPATHY, WITH LONG-TERM CURRENT USE OF INSULIN (HCC): ICD-10-CM

## 2019-08-22 DIAGNOSIS — E11.42 TYPE 2 DIABETES MELLITUS WITH DIABETIC POLYNEUROPATHY, WITH LONG-TERM CURRENT USE OF INSULIN (HCC): ICD-10-CM

## 2019-08-22 DIAGNOSIS — E11.42 TYPE 2 DIABETES MELLITUS WITH DIABETIC POLYNEUROPATHY, WITH LONG-TERM CURRENT USE OF INSULIN (HCC): Primary | ICD-10-CM

## 2019-08-22 DIAGNOSIS — I50.22 CHRONIC SYSTOLIC CONGESTIVE HEART FAILURE (HCC): ICD-10-CM

## 2019-08-22 PROBLEM — L08.9 DIABETIC FOOT INFECTION (HCC): Status: ACTIVE | Noted: 2019-08-22

## 2019-08-22 PROBLEM — E11.628 DIABETIC FOOT INFECTION (HCC): Status: ACTIVE | Noted: 2019-08-22

## 2019-08-22 LAB
A/G RATIO: 1.4 (ref 1.1–2.2)
ALBUMIN SERPL-MCNC: 4 G/DL (ref 3.4–5)
ALP BLD-CCNC: 70 U/L (ref 40–129)
ALT SERPL-CCNC: 8 U/L (ref 10–40)
ANION GAP SERPL CALCULATED.3IONS-SCNC: 12 MMOL/L (ref 3–16)
AST SERPL-CCNC: 9 U/L (ref 15–37)
BASOPHILS ABSOLUTE: 0 K/UL (ref 0–0.2)
BASOPHILS RELATIVE PERCENT: 0.1 %
BILIRUB SERPL-MCNC: 0.8 MG/DL (ref 0–1)
BUN BLDV-MCNC: 24 MG/DL (ref 7–20)
CALCIUM SERPL-MCNC: 9 MG/DL (ref 8.3–10.6)
CHLORIDE BLD-SCNC: 98 MMOL/L (ref 99–110)
CO2: 24 MMOL/L (ref 21–32)
CREAT SERPL-MCNC: 0.7 MG/DL (ref 0.9–1.3)
EOSINOPHILS ABSOLUTE: 0.4 K/UL (ref 0–0.6)
EOSINOPHILS RELATIVE PERCENT: 3.7 %
GFR AFRICAN AMERICAN: >60
GFR NON-AFRICAN AMERICAN: >60
GLOBULIN: 2.8 G/DL
GLUCOSE BLD-MCNC: 260 MG/DL (ref 70–99)
GLUCOSE BLD-MCNC: 271 MG/DL (ref 70–99)
GLUCOSE BLD-MCNC: 305 MG/DL (ref 70–99)
GLUCOSE BLD-MCNC: 370 MG/DL (ref 70–99)
HCT VFR BLD CALC: 39.8 % (ref 40.5–52.5)
HEMOGLOBIN: 13.9 G/DL (ref 13.5–17.5)
LYMPHOCYTES ABSOLUTE: 2.3 K/UL (ref 1–5.1)
LYMPHOCYTES RELATIVE PERCENT: 23 %
MCH RBC QN AUTO: 32.6 PG (ref 26–34)
MCHC RBC AUTO-ENTMCNC: 34.8 G/DL (ref 31–36)
MCV RBC AUTO: 93.8 FL (ref 80–100)
MONOCYTES ABSOLUTE: 0.9 K/UL (ref 0–1.3)
MONOCYTES RELATIVE PERCENT: 8.6 %
NEUTROPHILS ABSOLUTE: 6.4 K/UL (ref 1.7–7.7)
NEUTROPHILS RELATIVE PERCENT: 64.6 %
PDW BLD-RTO: 13.4 % (ref 12.4–15.4)
PERFORMED ON: ABNORMAL
PLATELET # BLD: 231 K/UL (ref 135–450)
PMV BLD AUTO: 7.9 FL (ref 5–10.5)
POTASSIUM REFLEX MAGNESIUM: 4.1 MMOL/L (ref 3.5–5.1)
RBC # BLD: 4.25 M/UL (ref 4.2–5.9)
SODIUM BLD-SCNC: 134 MMOL/L (ref 136–145)
TOTAL PROTEIN: 6.8 G/DL (ref 6.4–8.2)
WBC # BLD: 10 K/UL (ref 4–11)

## 2019-08-22 PROCEDURE — 6370000000 HC RX 637 (ALT 250 FOR IP): Performed by: INTERNAL MEDICINE

## 2019-08-22 PROCEDURE — 6370000000 HC RX 637 (ALT 250 FOR IP): Performed by: PHYSICIAN ASSISTANT

## 2019-08-22 PROCEDURE — 85025 COMPLETE CBC W/AUTO DIFF WBC: CPT

## 2019-08-22 PROCEDURE — 1200000000 HC SEMI PRIVATE

## 2019-08-22 PROCEDURE — 83036 HEMOGLOBIN GLYCOSYLATED A1C: CPT

## 2019-08-22 PROCEDURE — 73630 X-RAY EXAM OF FOOT: CPT

## 2019-08-22 PROCEDURE — 36415 COLL VENOUS BLD VENIPUNCTURE: CPT

## 2019-08-22 PROCEDURE — 99213 OFFICE O/P EST LOW 20 MIN: CPT | Performed by: PHYSICIAN ASSISTANT

## 2019-08-22 PROCEDURE — 80053 COMPREHEN METABOLIC PANEL: CPT

## 2019-08-22 PROCEDURE — 2580000003 HC RX 258: Performed by: PHYSICIAN ASSISTANT

## 2019-08-22 PROCEDURE — 6360000002 HC RX W HCPCS: Performed by: PHYSICIAN ASSISTANT

## 2019-08-22 PROCEDURE — 99213 OFFICE O/P EST LOW 20 MIN: CPT

## 2019-08-22 PROCEDURE — 87040 BLOOD CULTURE FOR BACTERIA: CPT

## 2019-08-22 RX ORDER — METRONIDAZOLE 250 MG/1
500 TABLET ORAL 4 TIMES DAILY
Status: DISCONTINUED | OUTPATIENT
Start: 2019-08-22 | End: 2019-08-23

## 2019-08-22 RX ORDER — SODIUM CHLORIDE 0.9 % (FLUSH) 0.9 %
10 SYRINGE (ML) INJECTION PRN
Status: DISCONTINUED | OUTPATIENT
Start: 2019-08-22 | End: 2019-08-26 | Stop reason: HOSPADM

## 2019-08-22 RX ORDER — DEXTROSE MONOHYDRATE 50 MG/ML
100 INJECTION, SOLUTION INTRAVENOUS PRN
Status: DISCONTINUED | OUTPATIENT
Start: 2019-08-22 | End: 2019-08-26 | Stop reason: HOSPADM

## 2019-08-22 RX ORDER — INSULIN GLARGINE 100 [IU]/ML
16 INJECTION, SOLUTION SUBCUTANEOUS NIGHTLY
Status: DISCONTINUED | OUTPATIENT
Start: 2019-08-22 | End: 2019-08-23

## 2019-08-22 RX ORDER — SIMVASTATIN 10 MG
20 TABLET ORAL NIGHTLY
Status: DISCONTINUED | OUTPATIENT
Start: 2019-08-22 | End: 2019-08-26 | Stop reason: HOSPADM

## 2019-08-22 RX ORDER — FUROSEMIDE 20 MG/1
20 TABLET ORAL DAILY
Qty: 30 TABLET | Refills: 2 | Status: SHIPPED | OUTPATIENT
Start: 2019-08-22 | End: 2020-02-21

## 2019-08-22 RX ORDER — CEFTRIAXONE 2 G/1
2 INJECTION, POWDER, FOR SOLUTION INTRAMUSCULAR; INTRAVENOUS EVERY 24 HOURS
Status: DISCONTINUED | OUTPATIENT
Start: 2019-08-22 | End: 2019-08-22

## 2019-08-22 RX ORDER — SODIUM CHLORIDE 0.9 % (FLUSH) 0.9 %
10 SYRINGE (ML) INJECTION EVERY 12 HOURS SCHEDULED
Status: DISCONTINUED | OUTPATIENT
Start: 2019-08-22 | End: 2019-08-26 | Stop reason: HOSPADM

## 2019-08-22 RX ORDER — TIZANIDINE 4 MG/1
4 TABLET ORAL NIGHTLY
Status: DISCONTINUED | OUTPATIENT
Start: 2019-08-22 | End: 2019-08-26 | Stop reason: HOSPADM

## 2019-08-22 RX ORDER — GABAPENTIN 300 MG/1
600 CAPSULE ORAL 3 TIMES DAILY
Status: DISCONTINUED | OUTPATIENT
Start: 2019-08-22 | End: 2019-08-26 | Stop reason: HOSPADM

## 2019-08-22 RX ORDER — FUROSEMIDE 20 MG/1
20 TABLET ORAL DAILY
Status: DISCONTINUED | OUTPATIENT
Start: 2019-08-22 | End: 2019-08-26 | Stop reason: HOSPADM

## 2019-08-22 RX ORDER — ACETAMINOPHEN 325 MG/1
650 TABLET ORAL EVERY 4 HOURS PRN
Status: DISCONTINUED | OUTPATIENT
Start: 2019-08-22 | End: 2019-08-26 | Stop reason: HOSPADM

## 2019-08-22 RX ORDER — PAROXETINE HYDROCHLORIDE 20 MG/1
10 TABLET, FILM COATED ORAL EVERY MORNING
Status: DISCONTINUED | OUTPATIENT
Start: 2019-08-23 | End: 2019-08-26 | Stop reason: HOSPADM

## 2019-08-22 RX ORDER — LIDOCAINE 40 MG/G
CREAM TOPICAL ONCE
Status: DISCONTINUED | OUTPATIENT
Start: 2019-08-22 | End: 2019-08-23 | Stop reason: HOSPADM

## 2019-08-22 RX ORDER — SIMVASTATIN 20 MG
TABLET ORAL
Qty: 30 TABLET | Refills: 2 | Status: SHIPPED | OUTPATIENT
Start: 2019-08-22 | End: 2019-09-05

## 2019-08-22 RX ORDER — GEMFIBROZIL 600 MG/1
600 TABLET, FILM COATED ORAL
Status: DISCONTINUED | OUTPATIENT
Start: 2019-08-22 | End: 2019-08-26 | Stop reason: HOSPADM

## 2019-08-22 RX ORDER — LOSARTAN POTASSIUM 25 MG/1
50 TABLET ORAL DAILY
Status: DISCONTINUED | OUTPATIENT
Start: 2019-08-22 | End: 2019-08-26 | Stop reason: HOSPADM

## 2019-08-22 RX ORDER — GLIMEPIRIDE 2 MG/1
4 TABLET ORAL
Status: DISCONTINUED | OUTPATIENT
Start: 2019-08-23 | End: 2019-08-26 | Stop reason: HOSPADM

## 2019-08-22 RX ORDER — NICOTINE POLACRILEX 4 MG
15 LOZENGE BUCCAL PRN
Status: DISCONTINUED | OUTPATIENT
Start: 2019-08-22 | End: 2019-08-26 | Stop reason: HOSPADM

## 2019-08-22 RX ORDER — MORPHINE SULFATE 2 MG/ML
2 INJECTION, SOLUTION INTRAMUSCULAR; INTRAVENOUS EVERY 4 HOURS PRN
Status: DISCONTINUED | OUTPATIENT
Start: 2019-08-22 | End: 2019-08-26 | Stop reason: HOSPADM

## 2019-08-22 RX ORDER — ONDANSETRON 2 MG/ML
4 INJECTION INTRAMUSCULAR; INTRAVENOUS EVERY 6 HOURS PRN
Status: DISCONTINUED | OUTPATIENT
Start: 2019-08-22 | End: 2019-08-26 | Stop reason: HOSPADM

## 2019-08-22 RX ORDER — CARVEDILOL 6.25 MG/1
12.5 TABLET ORAL 2 TIMES DAILY WITH MEALS
Status: DISCONTINUED | OUTPATIENT
Start: 2019-08-22 | End: 2019-08-26 | Stop reason: HOSPADM

## 2019-08-22 RX ORDER — DEXTROSE MONOHYDRATE 25 G/50ML
12.5 INJECTION, SOLUTION INTRAVENOUS PRN
Status: DISCONTINUED | OUTPATIENT
Start: 2019-08-22 | End: 2019-08-26 | Stop reason: HOSPADM

## 2019-08-22 RX ADMIN — GEMFIBROZIL 600 MG: 600 TABLET ORAL at 16:57

## 2019-08-22 RX ADMIN — METRONIDAZOLE 500 MG: 250 TABLET ORAL at 16:57

## 2019-08-22 RX ADMIN — SODIUM CHLORIDE 2 G: 900 INJECTION INTRAVENOUS at 16:58

## 2019-08-22 RX ADMIN — INSULIN GLARGINE 16 UNITS: 100 INJECTION, SOLUTION SUBCUTANEOUS at 22:09

## 2019-08-22 RX ADMIN — SIMVASTATIN 20 MG: 10 TABLET, FILM COATED ORAL at 21:40

## 2019-08-22 RX ADMIN — INSULIN LISPRO 2 UNITS: 100 INJECTION, SOLUTION INTRAVENOUS; SUBCUTANEOUS at 22:10

## 2019-08-22 RX ADMIN — CARVEDILOL 12.5 MG: 6.25 TABLET, FILM COATED ORAL at 16:57

## 2019-08-22 RX ADMIN — METRONIDAZOLE 500 MG: 250 TABLET ORAL at 21:40

## 2019-08-22 RX ADMIN — LOSARTAN POTASSIUM 50 MG: 25 TABLET, FILM COATED ORAL at 17:07

## 2019-08-22 RX ADMIN — FUROSEMIDE 20 MG: 20 TABLET ORAL at 17:08

## 2019-08-22 RX ADMIN — INSULIN LISPRO 4 UNITS: 100 INJECTION, SOLUTION INTRAVENOUS; SUBCUTANEOUS at 17:00

## 2019-08-22 RX ADMIN — INSULIN LISPRO 7 UNITS: 100 INJECTION, SOLUTION INTRAVENOUS; SUBCUTANEOUS at 17:00

## 2019-08-22 RX ADMIN — GABAPENTIN 600 MG: 300 CAPSULE ORAL at 17:07

## 2019-08-22 RX ADMIN — TIZANIDINE 4 MG: 4 TABLET ORAL at 22:16

## 2019-08-22 RX ADMIN — GABAPENTIN 600 MG: 300 CAPSULE ORAL at 21:40

## 2019-08-22 ASSESSMENT — PAIN SCALES - GENERAL
PAINLEVEL_OUTOF10: 0

## 2019-08-22 ASSESSMENT — ENCOUNTER SYMPTOMS
NAUSEA: 0
ABDOMINAL PAIN: 0
DIARRHEA: 0
VOMITING: 0

## 2019-08-22 NOTE — PLAN OF CARE
[facility] Summary     1. General --     Active wound etiologies:                     diabetic (Reyna grade 3). PCP and pertinent consultants: Neville Weber MD         Other plans for overall health:                        Blood sugar control-new orders from PCP for medication adjustment on 19     [address]:                        [facility] for IV therapy                                                             [facility] for IV medications     Wound Care Supplies:                        provided by home care company     Most recent [facility] lab results:               Lab Results   Component Value Date     LABA1C 9.3 2019                Lab Results   Component Value Date     LABALBU 3.9 2019                Lab Results   Component Value Date     CREATININE 0.7 (L) 2019                Lab Results   Component Value Date     HGB 13.3 (L) 2019         2. Circulatory status, if lower extremity ulcer --     Most recent JOEL (date, 19             Right JOEL: 1.15 mmHg     Left JOEL: 1.09 mmHg     Most recent arterial imaging:              none     Most recent arterial Rx:                      none     Current management of edema:        N/A -- no edema present. Most recent venous imaging:              none     3. Debridement --     Debridement methods, past or present:         sharp. Pertinent surgical history for wound(s):          None to date     4.          Infection --      Recent culture results:            19 no growth (patient had been on antibiotics)     Recent Imagin/5/19 MRI                                                   19 xray of right foot/toes     Recent antibiotic Rx:   Vancomycin 1750mg Q12H  19 - 8/3/19                                      Zosyn  3.375mg Q8h 19 - 19                                      Vancomycin 1250 mg Q8H 8/3/19 - 19

## 2019-08-22 NOTE — CONSULTS
organomegaly    DP/PT palpable bilateral  Ulcers on the right hallux plantar aspect which do communicate and probe to bone. Excessive manipulation at the HIPJ and grinding of the bones noted with manipulation. Moderate erythema and edema of the hallux and extending into the foot as well. No malodor noted. Assessment:  Patient Active Problem List   Diagnosis Code    Hypertension I10    Diabetes mellitus ([facility] E11.9    CHF (congestive heart failure) ([facility]) I50.9    Cardiomy[facility] I42.9    Mixed hyperlipidemia E78.2    Diabetic peripheral neuropathy associated with type 2 diabetes mellitus [facility] E11.42    Neuropathic diabetic ulcer of foot ([facility]) E11.621, L97.509, E11.40    Diabetic ulcer of toe of right foot associated with type 2 diabetes mellitus ([facility] E11.621, L97.519    Osteomyelitis due to type 2 diabetes mellitus ([facility] E11.69, M86.9    Diabetic foot infection ([facility]) E11.628, L08.9     diabetic foot ulcers right hallux secondary to peripheral neuropathy   diabetes mellitus uncontrolled  Osteomyelitis right hallux secondary to diabetic foot ulcer   Cellulitis right foot        Plan  Patient examined. Reviewed labs and imaging. Given clinical and xray findings I agree with Dr. Chilo Mcleod that he will require surgical intervention. Discussed hallux amputation at a minimum. Understands potential for needing partial 1st metatarsal resection as well. Explained that a higher level amputation is a potential but hoping to avoid that. Explained that any foot surgery comes with the risk of residual infection and that he could need further surgery in the future or develop a nonhealing wound or other complications. He understands. Encouraged control of glucose levels which he is working with PCP for this. Discussed risks, complications, alternatives and benefits. Understands chance of nonhealing wound, infection, need for further surgery, loss of limb or life. NPO after midnight.  Plan for partial right foot amputation (hallux vs partial 1st ray anticipated) tomorrow. Discussed with Dr. Josue Moore. Thank you for allowing me to participate in the care of your patient.          Electronically signed by Keon Perez DPM on 8/22/2019 at 3:53 PM.

## 2019-08-22 NOTE — PROGRESS NOTES
Consult has been called to Dr. Mesfin Donnelly on 8/22/19. Spoke with dr Mesfin Donnelly.  4:22 PM    Brenton Henley  8/22/2019

## 2019-08-22 NOTE — PROGRESS NOTES
Spoke with ROSEY Jasso with Dr Alisa Sullivan, hospitalist & accepting admission to [facility], no tele, no isolation. Await a bed assignment. Patient updated on above.

## 2019-08-23 ENCOUNTER — ANESTHESIA (OUTPATIENT)
Dept: OPERATING ROOM | Age: 44
DRG: 617 | End: 2019-08-23
Payer: COMMERCIAL

## 2019-08-23 ENCOUNTER — APPOINTMENT (OUTPATIENT)
Dept: GENERAL RADIOLOGY | Age: 44
DRG: 617 | End: 2019-08-23
Attending: INTERNAL MEDICINE
Payer: COMMERCIAL

## 2019-08-23 ENCOUNTER — ANESTHESIA EVENT (OUTPATIENT)
Dept: OPERATING ROOM | Age: 44
DRG: 617 | End: 2019-08-23
Payer: COMMERCIAL

## 2019-08-23 VITALS
RESPIRATION RATE: 27 BRPM | DIASTOLIC BLOOD PRESSURE: 107 MMHG | OXYGEN SATURATION: 100 % | SYSTOLIC BLOOD PRESSURE: 136 MMHG

## 2019-08-23 LAB
ANION GAP SERPL CALCULATED.3IONS-SCNC: 11 MMOL/L (ref 3–16)
BASOPHILS ABSOLUTE: 0.1 K/UL (ref 0–0.2)
BASOPHILS RELATIVE PERCENT: 1.4 %
BUN BLDV-MCNC: 23 MG/DL (ref 7–20)
CALCIUM SERPL-MCNC: 9.1 MG/DL (ref 8.3–10.6)
CHLORIDE BLD-SCNC: 98 MMOL/L (ref 99–110)
CO2: 25 MMOL/L (ref 21–32)
CREAT SERPL-MCNC: 0.7 MG/DL (ref 0.9–1.3)
EOSINOPHILS ABSOLUTE: 0.4 K/UL (ref 0–0.6)
EOSINOPHILS RELATIVE PERCENT: 3.8 %
ESTIMATED AVERAGE GLUCOSE: 203 MG/DL
GFR AFRICAN AMERICAN: >60
GFR NON-AFRICAN AMERICAN: >60
GLUCOSE BLD-MCNC: 232 MG/DL (ref 70–99)
GLUCOSE BLD-MCNC: 242 MG/DL (ref 70–99)
GLUCOSE BLD-MCNC: 244 MG/DL (ref 70–99)
GLUCOSE BLD-MCNC: 257 MG/DL (ref 70–99)
GLUCOSE BLD-MCNC: 265 MG/DL (ref 70–99)
GLUCOSE BLD-MCNC: 265 MG/DL (ref 70–99)
GLUCOSE BLD-MCNC: 266 MG/DL (ref 70–99)
HBA1C MFR BLD: 8.7 %
HCT VFR BLD CALC: 38.7 % (ref 40.5–52.5)
HEMOGLOBIN: 13.5 G/DL (ref 13.5–17.5)
LYMPHOCYTES ABSOLUTE: 2.3 K/UL (ref 1–5.1)
LYMPHOCYTES RELATIVE PERCENT: 23.6 %
MCH RBC QN AUTO: 32.7 PG (ref 26–34)
MCHC RBC AUTO-ENTMCNC: 35 G/DL (ref 31–36)
MCV RBC AUTO: 93.5 FL (ref 80–100)
MONOCYTES ABSOLUTE: 0.9 K/UL (ref 0–1.3)
MONOCYTES RELATIVE PERCENT: 9 %
NEUTROPHILS ABSOLUTE: 6.1 K/UL (ref 1.7–7.7)
NEUTROPHILS RELATIVE PERCENT: 62.2 %
PDW BLD-RTO: 13.1 % (ref 12.4–15.4)
PERFORMED ON: ABNORMAL
PLATELET # BLD: 199 K/UL (ref 135–450)
PMV BLD AUTO: 7.7 FL (ref 5–10.5)
POTASSIUM REFLEX MAGNESIUM: 3.8 MMOL/L (ref 3.5–5.1)
RBC # BLD: 4.14 M/UL (ref 4.2–5.9)
SODIUM BLD-SCNC: 134 MMOL/L (ref 136–145)
WBC # BLD: 9.8 K/UL (ref 4–11)

## 2019-08-23 PROCEDURE — 2580000003 HC RX 258: Performed by: ANESTHESIOLOGY

## 2019-08-23 PROCEDURE — 2500000003 HC RX 250 WO HCPCS: Performed by: PODIATRIST

## 2019-08-23 PROCEDURE — 85025 COMPLETE CBC W/AUTO DIFF WBC: CPT

## 2019-08-23 PROCEDURE — 6360000002 HC RX W HCPCS: Performed by: INTERNAL MEDICINE

## 2019-08-23 PROCEDURE — 3600000002 HC SURGERY LEVEL 2 BASE: Performed by: PODIATRIST

## 2019-08-23 PROCEDURE — 87075 CULTR BACTERIA EXCEPT BLOOD: CPT

## 2019-08-23 PROCEDURE — 2580000003 HC RX 258: Performed by: PODIATRIST

## 2019-08-23 PROCEDURE — 3700000001 HC ADD 15 MINUTES (ANESTHESIA): Performed by: PODIATRIST

## 2019-08-23 PROCEDURE — 6370000000 HC RX 637 (ALT 250 FOR IP): Performed by: PODIATRIST

## 2019-08-23 PROCEDURE — 88311 DECALCIFY TISSUE: CPT

## 2019-08-23 PROCEDURE — 2780000010 HC IMPLANT OTHER: Performed by: PODIATRIST

## 2019-08-23 PROCEDURE — 1200000000 HC SEMI PRIVATE

## 2019-08-23 PROCEDURE — 99222 1ST HOSP IP/OBS MODERATE 55: CPT | Performed by: INTERNAL MEDICINE

## 2019-08-23 PROCEDURE — 0Y6P0Z0 DETACHMENT AT RIGHT 1ST TOE, COMPLETE, OPEN APPROACH: ICD-10-PCS | Performed by: PODIATRIST

## 2019-08-23 PROCEDURE — 88305 TISSUE EXAM BY PATHOLOGIST: CPT

## 2019-08-23 PROCEDURE — 36592 COLLECT BLOOD FROM PICC: CPT

## 2019-08-23 PROCEDURE — 3600000012 HC SURGERY LEVEL 2 ADDTL 15MIN: Performed by: PODIATRIST

## 2019-08-23 PROCEDURE — 3700000000 HC ANESTHESIA ATTENDED CARE: Performed by: PODIATRIST

## 2019-08-23 PROCEDURE — 6360000002 HC RX W HCPCS: Performed by: NURSE ANESTHETIST, CERTIFIED REGISTERED

## 2019-08-23 PROCEDURE — 6370000000 HC RX 637 (ALT 250 FOR IP): Performed by: PHYSICIAN ASSISTANT

## 2019-08-23 PROCEDURE — 7100000011 HC PHASE II RECOVERY - ADDTL 15 MIN: Performed by: PODIATRIST

## 2019-08-23 PROCEDURE — 7100000010 HC PHASE II RECOVERY - FIRST 15 MIN: Performed by: PODIATRIST

## 2019-08-23 PROCEDURE — 2580000003 HC RX 258: Performed by: INTERNAL MEDICINE

## 2019-08-23 PROCEDURE — 6370000000 HC RX 637 (ALT 250 FOR IP)

## 2019-08-23 PROCEDURE — 99253 IP/OBS CNSLTJ NEW/EST LOW 45: CPT | Performed by: INTERNAL MEDICINE

## 2019-08-23 PROCEDURE — 6360000002 HC RX W HCPCS: Performed by: PODIATRIST

## 2019-08-23 PROCEDURE — 6360000002 HC RX W HCPCS: Performed by: PHYSICIAN ASSISTANT

## 2019-08-23 PROCEDURE — 87070 CULTURE OTHR SPECIMN AEROBIC: CPT

## 2019-08-23 PROCEDURE — 2709999900 HC NON-CHARGEABLE SUPPLY: Performed by: PODIATRIST

## 2019-08-23 PROCEDURE — 73620 X-RAY EXAM OF FOOT: CPT

## 2019-08-23 PROCEDURE — 2500000003 HC RX 250 WO HCPCS: Performed by: NURSE ANESTHETIST, CERTIFIED REGISTERED

## 2019-08-23 PROCEDURE — 80048 BASIC METABOLIC PNL TOTAL CA: CPT

## 2019-08-23 DEVICE — PATCH AMNION 2 LAYR PROTCT 4 X 4CM STERISHIELD II: Type: IMPLANTABLE DEVICE | Site: FOOT | Status: FUNCTIONAL

## 2019-08-23 RX ORDER — SODIUM CHLORIDE, SODIUM LACTATE, POTASSIUM CHLORIDE, CALCIUM CHLORIDE 600; 310; 30; 20 MG/100ML; MG/100ML; MG/100ML; MG/100ML
INJECTION, SOLUTION INTRAVENOUS
Status: COMPLETED
Start: 2019-08-23 | End: 2019-08-23

## 2019-08-23 RX ORDER — SODIUM CHLORIDE, SODIUM LACTATE, POTASSIUM CHLORIDE, CALCIUM CHLORIDE 600; 310; 30; 20 MG/100ML; MG/100ML; MG/100ML; MG/100ML
INJECTION, SOLUTION INTRAVENOUS CONTINUOUS
Status: DISCONTINUED | OUTPATIENT
Start: 2019-08-23 | End: 2019-08-24

## 2019-08-23 RX ORDER — OXYCODONE HYDROCHLORIDE AND ACETAMINOPHEN 5; 325 MG/1; MG/1
2 TABLET ORAL PRN
Status: DISCONTINUED | OUTPATIENT
Start: 2019-08-23 | End: 2019-08-23 | Stop reason: HOSPADM

## 2019-08-23 RX ORDER — HYDRALAZINE HYDROCHLORIDE 20 MG/ML
5 INJECTION INTRAMUSCULAR; INTRAVENOUS EVERY 10 MIN PRN
Status: DISCONTINUED | OUTPATIENT
Start: 2019-08-23 | End: 2019-08-23 | Stop reason: HOSPADM

## 2019-08-23 RX ORDER — OXYCODONE HYDROCHLORIDE AND ACETAMINOPHEN 5; 325 MG/1; MG/1
1 TABLET ORAL PRN
Status: DISCONTINUED | OUTPATIENT
Start: 2019-08-23 | End: 2019-08-23 | Stop reason: HOSPADM

## 2019-08-23 RX ORDER — SODIUM CHLORIDE 9 MG/ML
INJECTION, SOLUTION INTRAVENOUS
Status: DISPENSED
Start: 2019-08-23 | End: 2019-08-23

## 2019-08-23 RX ORDER — PROPOFOL 10 MG/ML
INJECTION, EMULSION INTRAVENOUS PRN
Status: DISCONTINUED | OUTPATIENT
Start: 2019-08-23 | End: 2019-08-23 | Stop reason: SDUPTHER

## 2019-08-23 RX ORDER — LABETALOL HYDROCHLORIDE 5 MG/ML
5 INJECTION, SOLUTION INTRAVENOUS EVERY 10 MIN PRN
Status: DISCONTINUED | OUTPATIENT
Start: 2019-08-23 | End: 2019-08-23 | Stop reason: HOSPADM

## 2019-08-23 RX ORDER — MIDAZOLAM HYDROCHLORIDE 1 MG/ML
INJECTION INTRAMUSCULAR; INTRAVENOUS PRN
Status: DISCONTINUED | OUTPATIENT
Start: 2019-08-23 | End: 2019-08-23 | Stop reason: SDUPTHER

## 2019-08-23 RX ORDER — HYDROCODONE BITARTRATE AND ACETAMINOPHEN 5; 325 MG/1; MG/1
2 TABLET ORAL ONCE
Status: COMPLETED | OUTPATIENT
Start: 2019-08-23 | End: 2019-08-23

## 2019-08-23 RX ORDER — OXYCODONE HYDROCHLORIDE AND ACETAMINOPHEN 5; 325 MG/1; MG/1
2 TABLET ORAL EVERY 4 HOURS PRN
Status: DISCONTINUED | OUTPATIENT
Start: 2019-08-23 | End: 2019-08-26 | Stop reason: HOSPADM

## 2019-08-23 RX ORDER — ONDANSETRON 2 MG/ML
4 INJECTION INTRAMUSCULAR; INTRAVENOUS EVERY 10 MIN PRN
Status: DISCONTINUED | OUTPATIENT
Start: 2019-08-23 | End: 2019-08-23 | Stop reason: HOSPADM

## 2019-08-23 RX ORDER — INSULIN GLARGINE 100 [IU]/ML
25 INJECTION, SOLUTION SUBCUTANEOUS NIGHTLY
Status: DISCONTINUED | OUTPATIENT
Start: 2019-08-23 | End: 2019-08-24

## 2019-08-23 RX ORDER — OXYCODONE HYDROCHLORIDE AND ACETAMINOPHEN 5; 325 MG/1; MG/1
1 TABLET ORAL EVERY 4 HOURS PRN
Status: DISCONTINUED | OUTPATIENT
Start: 2019-08-23 | End: 2019-08-26 | Stop reason: HOSPADM

## 2019-08-23 RX ORDER — HYDROCODONE BITARTRATE AND ACETAMINOPHEN 5; 325 MG/1; MG/1
TABLET ORAL
Status: COMPLETED
Start: 2019-08-23 | End: 2019-08-23

## 2019-08-23 RX ORDER — INSULIN GLARGINE 100 [IU]/ML
10 INJECTION, SOLUTION SUBCUTANEOUS ONCE
Status: COMPLETED | OUTPATIENT
Start: 2019-08-23 | End: 2019-08-23

## 2019-08-23 RX ORDER — LIDOCAINE HYDROCHLORIDE 20 MG/ML
INJECTION, SOLUTION INFILTRATION; PERINEURAL PRN
Status: DISCONTINUED | OUTPATIENT
Start: 2019-08-23 | End: 2019-08-23 | Stop reason: SDUPTHER

## 2019-08-23 RX ADMIN — SIMVASTATIN 20 MG: 10 TABLET, FILM COATED ORAL at 20:11

## 2019-08-23 RX ADMIN — Medication 10 ML: at 20:12

## 2019-08-23 RX ADMIN — ONDANSETRON 4 MG: 2 INJECTION INTRAMUSCULAR; INTRAVENOUS at 08:12

## 2019-08-23 RX ADMIN — GABAPENTIN 600 MG: 300 CAPSULE ORAL at 08:07

## 2019-08-23 RX ADMIN — GLIMEPIRIDE 4 MG: 2 TABLET ORAL at 06:41

## 2019-08-23 RX ADMIN — CEFEPIME 2 G: 2 INJECTION, POWDER, FOR SOLUTION INTRAVENOUS at 08:08

## 2019-08-23 RX ADMIN — GEMFIBROZIL 600 MG: 600 TABLET ORAL at 06:41

## 2019-08-23 RX ADMIN — INSULIN LISPRO 7 UNITS: 100 INJECTION, SOLUTION INTRAVENOUS; SUBCUTANEOUS at 15:07

## 2019-08-23 RX ADMIN — OXYCODONE HYDROCHLORIDE AND ACETAMINOPHEN 1 TABLET: 5; 325 TABLET ORAL at 20:11

## 2019-08-23 RX ADMIN — GABAPENTIN 600 MG: 300 CAPSULE ORAL at 20:10

## 2019-08-23 RX ADMIN — SODIUM CHLORIDE, POTASSIUM CHLORIDE, SODIUM LACTATE AND CALCIUM CHLORIDE: 600; 310; 30; 20 INJECTION, SOLUTION INTRAVENOUS at 12:34

## 2019-08-23 RX ADMIN — INSULIN LISPRO 1 UNITS: 100 INJECTION, SOLUTION INTRAVENOUS; SUBCUTANEOUS at 20:14

## 2019-08-23 RX ADMIN — CEFEPIME 2 G: 2 INJECTION, POWDER, FOR SOLUTION INTRAVENOUS at 20:00

## 2019-08-23 RX ADMIN — PROPOFOL 70 MG: 10 INJECTION, EMULSION INTRAVENOUS at 12:37

## 2019-08-23 RX ADMIN — MIDAZOLAM 2 MG: 1 INJECTION INTRAMUSCULAR; INTRAVENOUS at 12:35

## 2019-08-23 RX ADMIN — Medication 1.5 G: at 08:39

## 2019-08-23 RX ADMIN — INSULIN GLARGINE 25 UNITS: 100 INJECTION, SOLUTION SUBCUTANEOUS at 20:14

## 2019-08-23 RX ADMIN — CARVEDILOL 12.5 MG: 6.25 TABLET, FILM COATED ORAL at 16:43

## 2019-08-23 RX ADMIN — INSULIN LISPRO 3 UNITS: 100 INJECTION, SOLUTION INTRAVENOUS; SUBCUTANEOUS at 17:27

## 2019-08-23 RX ADMIN — INSULIN GLARGINE 10 UNITS: 100 INJECTION, SOLUTION SUBCUTANEOUS at 08:12

## 2019-08-23 RX ADMIN — ACETAMINOPHEN 650 MG: 325 TABLET ORAL at 22:45

## 2019-08-23 RX ADMIN — GABAPENTIN 600 MG: 300 CAPSULE ORAL at 15:07

## 2019-08-23 RX ADMIN — HYDROCODONE BITARTRATE AND ACETAMINOPHEN 2 TABLET: 5; 325 TABLET ORAL at 13:34

## 2019-08-23 RX ADMIN — GLIMEPIRIDE 4 MG: 2 TABLET ORAL at 15:07

## 2019-08-23 RX ADMIN — TIZANIDINE 4 MG: 4 TABLET ORAL at 22:44

## 2019-08-23 RX ADMIN — LIDOCAINE HYDROCHLORIDE 50 MG: 20 INJECTION, SOLUTION INFILTRATION; PERINEURAL at 12:37

## 2019-08-23 RX ADMIN — CARVEDILOL 12.5 MG: 6.25 TABLET, FILM COATED ORAL at 08:07

## 2019-08-23 RX ADMIN — Medication 1.5 G: at 20:13

## 2019-08-23 RX ADMIN — SODIUM CHLORIDE, POTASSIUM CHLORIDE, SODIUM LACTATE AND CALCIUM CHLORIDE: 600; 310; 30; 20 INJECTION, SOLUTION INTRAVENOUS at 12:13

## 2019-08-23 RX ADMIN — INSULIN LISPRO 2 UNITS: 100 INJECTION, SOLUTION INTRAVENOUS; SUBCUTANEOUS at 15:08

## 2019-08-23 ASSESSMENT — PULMONARY FUNCTION TESTS
PIF_VALUE: 0

## 2019-08-23 ASSESSMENT — PAIN SCALES - GENERAL
PAINLEVEL_OUTOF10: 8
PAINLEVEL_OUTOF10: 2
PAINLEVEL_OUTOF10: 0
PAINLEVEL_OUTOF10: 0
PAINLEVEL_OUTOF10: 6
PAINLEVEL_OUTOF10: 7
PAINLEVEL_OUTOF10: 7

## 2019-08-23 ASSESSMENT — PAIN DESCRIPTION - ORIENTATION: ORIENTATION: OTHER (COMMENT)

## 2019-08-23 ASSESSMENT — PAIN DESCRIPTION - DESCRIPTORS
DESCRIPTORS: ACHING;POUNDING
DESCRIPTORS: HEADACHE

## 2019-08-23 ASSESSMENT — PAIN DESCRIPTION - LOCATION: LOCATION: HEAD

## 2019-08-23 ASSESSMENT — PAIN DESCRIPTION - FREQUENCY: FREQUENCY: CONTINUOUS

## 2019-08-23 ASSESSMENT — PAIN DESCRIPTION - PAIN TYPE: TYPE: ACUTE PAIN

## 2019-08-23 NOTE — H&P
Hospital Medicine History & Physical      PCP: Neville Weber MD    Date of Admission: 8/22/2019    Date of Service: Pt seen/examined on 8/23/2019   Chief Complaint:  Foot swelling     History Of Present Illness: The patient is a 41 y.o. male with uncontrolled DM2, diabetic neuropathy, HTN, obesity who presented from wound care for ongoing right great toe osteomyelitis. Pt was recently here for the same and opted out of surgery and did IV abx with no improvement. Podiatry notes ongoing infection and recommend right hallux amputation. Since last time pt has been closely monitoring his sugars and been compliant with insulin regimen. Fasting still above 200 . No fevers or chills  No new changes in bowel habits      Past Medical History:        Diagnosis Date    Arthritis     OA    Clostridium difficile infection 11/01/2016    PCR+    Diabetes mellitus ([address]     TYPE 2- NO MEDS SINCE WEIGHT LOSS    Hyperlipidemia     Hypertension     Medial meniscus tear     LEFT    Osteoarthritis     Type II or unspecified type diabetes mellitus without mention of complication, not stated as uncontrolled        Past Surgical History:        Procedure Laterality Date    KNEE ARTHROSCOPY Left [id number]    LEFT KNEE ARTHROSCOPY , SYNOVECTOMY       TONSILLECTOMY      WISDOM TOOTH EXTRACTION         Medications Prior to Admission:    Prior to Admission medications    Medication Sig Start Date End Date Taking?  Authorizing Provider   insulin glargine (TOUJEO SOLOSTAR) 300 UNIT/ML injection pen Inject 20 Units into the skin nightly 8/22/19   Kaylee Valdez PA-C   insulin lispro (HUMALOG KWIKPEN) 100 UNIT/ML pen Inject 7 Units into the skin 3 times daily (before meals) 8/22/19   Kaylee Valdez PA-C   furosemide (LASIX) 20 MG tablet Take 1 tablet by mouth daily 8/22/19   Kaylee Valdez PA-C   simvastatin (ZOCOR) 20 MG tablet TAKE ONE TABLET BY MOUTH ONCE NIGHTLY 8/22/19   Kaylee Valdez PA-C   Insulin Syringe-Needle U-100 (AIM[employer] INS SYR .3CC/29GX0.5\") 29G X 1/2\" 0.3 ML MISC 1 each by Does not apply route daily 8/22/19   Azzie Free, PA-C   metFORMIN (GLUCOPHAGE) 1000 MG tablet Take 1 tablet by mouth 2 times daily (with meals) 8/22/19   Azzie Free, PA-C   gabapentin (NEURONTIN) 300 MG capsule Take 2 capsules by mouth 3 times daily for 30 days. 8/21/19 9/20/19  Katina Storey MD   cefTRIAXone (ROCEPHIN) 2 g injection Infuse 2 g intravenously every 24 hours x4 weeks per Dr Billings Shadow Provider, MD   metroNIDAZOLE (FLAGYL) 500 MG tablet Take 500 mg by mouth 4 times daily    Historical Provider, MD   blood glucose test strips (CONTOUR NEXT TEST) strip USE TO TEST BLOOD SUGAR LEVELS 3 TIMES DAILY 8/8/19   Katina Storey MD   losartan (COZAAR) 50 MG tablet TAKE 1 TABLET BY MOUTH DAILY 7/1/19   Katina Storey MD   PARoxetine (PAXIL) 10 MG tablet TAKE 1 TABLET BY MOUTH EVERY MORNING 6/18/19   Katina Storey MD   gemfibrozil (LOPID) 600 MG tablet Take 1 tablet by mouth 2 times daily (before meals) 5/2/19   Katina Storey MD   carvedilol (COREG) 12.5 MG tablet Take 1 tablet by mouth 2 times daily (with meals) 5/2/19   Katina Storey MD   glimepiride (AMARYL) 4 MG tablet Take 1 tablet by mouth every morning (before breakfast) 5/2/19   Katina Storey MD   sildenafil (VIAGRA) 100 MG tablet Take 1 tablet by mouth as needed for Erectile Dysfunction 5/2/19   Katina Storey MD   Lancets MISC Test three times a day with Contour Next EZ machine. DX: E11.9 11/16/17   Katina Storey MD   glucose blood VI test strips (EXACTECH TEST) strip 1 each by In Vitro route daily. As needed. 9/5/14   Katina Storey MD       Allergies:  Shaista Shelling [sitagliptin] and Colby Dennis isothiocyanate]    Social History:  The patient currently lives at home     TOBACCO:   reports that he quit smoking about 14 years ago. He has a 1.00 pack-year smoking history.  He quit smokeless tobacco use about 14 years ago. ETOH:   reports that he drinks alcohol. Family History:   Positive as follows:        Problem Relation Age of Onset    Diabetes Mother     High Blood Pressure Mother     High Cholesterol Mother     Diabetes Father     High Blood Pressure Father     High Cholesterol Father     Stroke Father     High Blood Pressure Sister     High Blood Pressure Maternal Uncle     High Cholesterol Maternal Uncle     High Blood Pressure Maternal Grandmother        REVIEW OF SYSTEMS:       Constitutional: no fevers or chills    HENT: Negative for sore throat   Eyes: Negative for redness   Respiratory: Negative  for dyspnea, cough   Cardiovascular: Negative for chest pain   Gastrointestinal: Negative for vomiting, diarrhea   Genitourinary: Negative for hematuria   Musculoskeletal: Negative for arthralgias   Skin: +rash and wound  Neurological: Negative for syncope   Hematological: Negative for adenopathy   Psychiatric/Behavorial: Negative for anxiety    PHYSICAL EXAM:    BP (!) 151/101   Pulse 108   Temp 98 °F (36.7 °C) (Oral)   Resp 16   Ht 6' (1.829 m)   Wt 266 lb (120.7 kg)   SpO2 96%   BMI 36.08 kg/m²         General: young male,   Awake, alert and oriented. Appears to be not in any distress  Mucous Membranes:  Pink , anicteric  Neck: No JVD, no carotid bruit, no thyromegaly  Chest:  Clear to auscultation bilaterally, no added sounds  Cardiovascular:  RRR S1S2 heard, no murmurs or gallops  Abdomen:  Soft, undistended, non tender, no organomegaly, BS present  Extremities: well healed finger burn injuries  Right plantar foot ulcer on bottom of great toe  -dressing not removed  No edema or cyanosis.  Distal pulses well felt  Neurological : grossly normal            CBC:   Recent Labs     08/20/19  1300 08/22/19  1605 08/23/19  0045   WBC 9.9 10.0 9.8   HGB 15.3 13.9 13.5   HCT 44.4 39.8* 38.7*   MCV 93.7 93.8 93.5    231 199     BMP:   Recent Labs     08/20/19  1300

## 2019-08-23 NOTE — ANESTHESIA PRE PROCEDURE
times daily (before meals) 5/2/19   Rubio Quiles MD   carvedilol (COREG) 12.5 MG tablet Take 1 tablet by mouth 2 times daily (with meals) 5/2/19   Rubio Quiles MD   glimepiride (AMARYL) 4 MG tablet Take 1 tablet by mouth every morning (before breakfast) 5/2/19   Rubio Quiles MD   sildenafil (VIAGRA) 100 MG tablet Take 1 tablet by mouth as needed for Erectile Dysfunction 5/2/19   Rubio Quiles MD   Lancets MISC Test three times a day with Contour Next EZ machine. DX: E11.9 11/16/17   Rubio Quiles MD   glucose blood VI test strips (EXACTECH TEST) strip 1 each by In Vitro route daily. As needed.  9/5/14   Rubio Quiles MD       Current medications:    Current Facility-Administered Medications   Medication Dose Route Frequency Provider Last Rate Last Dose    vancomycin 1.5 g in dextrose 5% 300 mL IVPB  1,500 mg Intravenous Q12H Cooper Quiñones MD   Stopped at 08/23/19 1054    cefepime (MAXIPIME) 2 g IVPB minibag  2 g Intravenous Q12H Cooper Quiñones MD   Stopped at 08/23/19 0950    insulin glargine (LANTUS) injection vial 25 Units  25 Units Subcutaneous Nightly Rubio Quiles MD        metFORMIN (GLUCOPHAGE) tablet 500 mg  500 mg Oral BID  Rubio Quiles MD   Stopped at 08/23/19 9376    sodium chloride 0.9 % infusion             carvedilol (COREG) tablet 12.5 mg  12.5 mg Oral BID  Lorena Odell PA   12.5 mg at 08/23/19 1929    furosemide (LASIX) tablet 20 mg  20 mg Oral Daily [place], [place]   Stopped at 08/23/19 1053    gabapentin (NEURONTIN) capsule 600 mg  600 mg Oral TID Lorena Odell PA   600 mg at 08/23/19 0807    gemfibrozil (LOPID) tablet 600 mg  600 mg Oral BID  Lorena Odell PA   600 mg at 08/23/19 0641    glimepiride (AMARYL) tablet 4 mg  4 mg Oral QAM [place], [place]   4 mg at 08/23/19 0641    insulin lispro (HUMALOG) injection vial 7 Units  7 Units Subcutaneous TID [facility] [place]   Stopped at 08/23/19 0809    losartan

## 2019-08-23 NOTE — PROGRESS NOTES
Emilia Reyes  Progress Note and Procedure Note      Adele Osei  AGE: 41 y.o. GENDER: male  : 1975  TODAY'S DATE:  8/15/2019    Subjective:     Chief Complaint   Patient presents with    Wound Check         HISTORY of PRESENT ILLNESS HPI     Flakito Hernandez is a 41 y.o. male who presents today for wound evaluation. History of Wound: This pleasant 57-year-old male was seen in the hospital for diabetic foot infection 2019. He has chosen to do IV antibiotic treatment versus amputation of the great toe. He was discharged earlier this week. He denies any pain. He is getting IV antibiotics. He is changing the bandage daily with Betadine ointment. He states that he does not want to do hyperbaric oxygen treatment to salvage his toe. He denies nausea, vomiting, fever, chills, shortness breath or chest pain. He states that the toe looks that her.   Wound Pain:  none  Severity:  0 / 10   Wound Type:  diabetic and pressure  Modifying Factors:  edema and poor glucose control  Associated Signs/Symptoms:  edema, erythema, drainage and pain        PAST MEDICAL HISTORY        Diagnosis Date    Arthritis     OA    Clostridium difficile infection 2016    PCR+    Diabetes mellitus (HCC)     TYPE 2- NO MEDS SINCE WEIGHT LOSS    Hyperlipidemia     Hypertension     Medial meniscus tear     LEFT    Osteoarthritis     Type II or unspecified type diabetes mellitus without mention of complication, not stated as uncontrolled        PAST SURGICAL HISTORY    Past Surgical History:   Procedure Laterality Date    KNEE ARTHROSCOPY Left [id number]    LEFT KNEE ARTHROSCOPY , SYNOVECTOMY       TONSILLECTOMY      WISDOM TOOTH EXTRACTION         FAMILY HISTORY    Family History   Problem Relation Age of Onset    Diabetes Mother     High Blood Pressure Mother     High Cholesterol Mother     Diabetes Father     High Blood Pressure Father     High Cholesterol Father     Stroke Father     High Blood Pressure Sister     High Blood Pressure Maternal Uncle     High Cholesterol Maternal Uncle     High Blood Pressure Maternal Grandmother        SOCIAL HISTORY    Social History     Tobacco Use    Smoking status: Former Smoker     Packs/day: 0.50     Years: 2.00     Pack years: 1.00     Last attempt to quit: 2005     Years since quittin.0    Smokeless tobacco: Former User     Quit date: 2005    Tobacco comment: SMOKED OCCASIONALLY UNTIL 8 YEARS AGO   Substance Use Topics    Alcohol use: Yes     Comment: RARELY    Drug use: No       ALLERGIES    Allergies   Allergen Reactions    Januvia [Sitagliptin] Nausea Only     Has taken metformin without side effects in the past.  Nausea with Janumet in the past.     Mustard Oil [Allyl Isothiocyanate] Swelling and Rash       MEDICATIONS    No current facility-administered medications on file prior to encounter. Current Outpatient Medications on File Prior to Encounter   Medication Sig Dispense Refill    PARoxetine (PAXIL) 10 MG tablet TAKE 1 TABLET BY MOUTH EVERY MORNING 30 tablet 2    glimepiride (AMARYL) 4 MG tablet Take 1 tablet by mouth every morning (before breakfast) 30 tablet 2    cefTRIAXone (ROCEPHIN) 2 g injection Infuse 2 g intravenously every 24 hours x4 weeks per Dr Brad Barone      metroNIDAZOLE (FLAGYL) 500 MG tablet Take 500 mg by mouth 4 times daily      blood glucose test strips (CONTOUR NEXT TEST) strip USE TO TEST BLOOD SUGAR LEVELS 3 TIMES DAILY 100 strip 11    losartan (COZAAR) 50 MG tablet TAKE 1 TABLET BY MOUTH DAILY 30 tablet 2    gemfibrozil (LOPID) 600 MG tablet Take 1 tablet by mouth 2 times daily (before meals) 60 tablet 3    carvedilol (COREG) 12.5 MG tablet Take 1 tablet by mouth 2 times daily (with meals) 60 tablet 2    sildenafil (VIAGRA) 100 MG tablet Take 1 tablet by mouth as needed for Erectile Dysfunction 15 tablet 0    Lancets MISC Test three times a day with Contour Next EZ machine. DX: E11.9 100 each 11    glucose blood VI test strips (EXACTECH TEST) strip 1 each by In Vitro route daily. As needed. 100 each 0       REVIEW OF SYSTEMS    Pertinent items are noted in HPI. Objective:      BP (!) 141/96   Pulse 99   Temp 97.9 °F (36.6 °C) (Oral)   Resp 18   Ht 6' (1.829 m)   Wt 268 lb (121.6 kg)   BMI 36.35 kg/m²     PHYSICAL EXAM    Vascular: Vascular status Intact  palpable pedal pulses, right DP2/4 and PT2/4, left DP2/4 and PT2/4. CFT 2 seconds digits 1 to 5 bilateral.  Hair growthAbsent  both lower extremities and feet. Skin temperature is warm to warm from pretibial area to distal digits bilateral.  Exam is negative for rubor, pallor, cyanosis or signs of acute vascular compromise bilaterally. Exam is positive for edema bilateral lower extremity. Varicosities Present bilateral lower extremity. Neuro: Neurologic status diminished bilateral with epicritic Absent  , proprioceptive Absent , vibratory sensationAbsent  and protopathic Absent. DTRs Present bilateral Achilles. There were no reproducible neuritic symptoms on exam bilateral feet/ankles. Derm: Ulceration to right Hallux probes into the IPJ. Ecchymosis Absent  bilateral feet/foot. Musculoskeletal: No pain with debridement wound 5/5 muscle strength in/eversion and dorsi/plantarflexion bilateral feet. No gross instability noted.             Assessment:     Patient Active Problem List   Diagnosis    Hypertension    Diabetes mellitus ([address]    CHF (congestive heart failure) ([address]    Cardiomyopathy ([address]    Mixed hyperlipidemia    Diabetic peripheral neuropathy associated with type 2 diabetes mellitus ([address]    Neuropathic diabetic ulcer of foot ([address]    Diabetic ulcer of toe of right foot associated with type 2 diabetes mellitus ([address]    Osteomyelitis due to type 2 diabetes mellitus ([address]    Diabetic foot infection ([address]     Procedure Note    Performed by: Sheryl Meza DPM    Consent obtained: 8/22/2019 10:04 AM   Drainage Description Serosanguinous 8/22/2019 10:04 AM   Odor None 8/22/2019 10:04 AM   Margins Attached edges 8/4/2019  5:26 PM   Exposed structure Bone 8/22/2019 10:48 AM   Shanita-wound Assessment Blanchable erythema 8/22/2019 10:04 AM   Number of days: 22       Total Surface Area Debrided:  1 sq cm     Percentage of wound debrided 100%    Bleeding:  Minimal    Hemostasis Achieved:  by pressure    Procedural Pain:  0  / 10     Post Procedural Pain:  0 / 10     Response to treatment:  Well tolerated by patient. Plan:   Patient examined and evaluated  Wound debridement. incident  Wound probes to joint capsule toe remains with mild erythema and dactylitis  Discussed with the patient that his toe is nonsalvageable and that he will need the toe amputated  He will need amputation of the toe with a staged procedure, I will be out of town this weekend and Dr. Magalys Allen is willing to take over the case    Discharge Treatment Wound 08/01/19 #1 Right great toe, DFU, carmichael 3, onset 7/28/19-Dressing/Treatment: Alginate with Ag, Dry Dressing    Written Patient Discharge Instructions Given            Electronically signed by Kwesi Denis DPM on 8/23/2019 at 4:23 PM

## 2019-08-23 NOTE — FLOWSHEET NOTE
08/23/19 1436   Vital Signs   Temp 98.1 °F (36.7 °C)   Temp Source Oral   Pulse 114   Heart Rate Source Monitor   Resp 16   /81   BP Location Right upper arm   Level of Consciousness 0   MEWS Score 3   Pain Assessment   Pain Level 0   Oxygen Therapy   SpO2 98 %   O2 Device None (Room air)       Pt back from surgery, upon entering room, pt was up in bathroom, blood trail from pt's bed to bathroom, pt returned to bed, surgical dressing reinforced with abd pad, kerlex and ace wrap, foot elevated, pt encouraged not to get up. calllight within reach. Jorge Luis Camarena, RN

## 2019-08-23 NOTE — CONSULTS
[address]                 [address] [address]                                  CONSULTATION    PATIENT NAME: Donaldo Nurse                   :        1975  MED REC NO:   [id number]                          ROOM:       0230  ACCOUNT NO:   [de-identified]                           ADMIT DATE: 2019  PROVIDER:     Chica Diaz MD    CONSULT DATE:  2019    HISTORY OF PRESENT ILLNESS:  The patient is a 72-year-old male with  diabetes and recurrent diabetic foot infections, who has been receiving  intravenous Rocephin and oral Flagyl at home for infection of his right  great toe with osteomyelitis. The patient indicates that since his last  discharge, the appearance of the toe had been improving, but over the  last 2 or 3 days, the toe has had a somewhat rubbery or floppy feeling  to it. He also noticed that the drainage from the toe was considerably  increased. The patient was seen by his podiatrist and was thought to  have probably unsalvageable osteomyelitis of the toe and arrangements  have been made for the patient to have an amputation to be performed on  2019. I have been asked to see the patient in consultation  regarding antibiotic management. The patient's chief complaint is  worsened osteomyelitis of the right great toe. A plain film showed  involvement of the interphalangeal joint of the toe. PAST MEDICAL HISTORY:  History of osteoarthritis, history of C.  difficile infection, diabetes type 2, hyperlipidemia, hypertension, left  meniscus tear, osteomyelitis of the right hallux as noted above,  tonsillectomy, third molar extraction, left knee arthroscopy. ALLERGIES:  JANUVIA and MUSTARD OIL. SOCIAL HISTORY:  The patient is a former smoker. He quit in . He  uses alcohol occasionally. He does not use illegal drugs.     REVIEW OF SYSTEMS:  The patient denies fevers, chills, sweats, nausea,  vomiting,

## 2019-08-24 LAB
BASOPHILS ABSOLUTE: 0.1 K/UL (ref 0–0.2)
BASOPHILS RELATIVE PERCENT: 1.2 %
EOSINOPHILS ABSOLUTE: 0.6 K/UL (ref 0–0.6)
EOSINOPHILS RELATIVE PERCENT: 6.7 %
GLUCOSE BLD-MCNC: 226 MG/DL (ref 70–99)
GLUCOSE BLD-MCNC: 249 MG/DL (ref 70–99)
GLUCOSE BLD-MCNC: 254 MG/DL (ref 70–99)
GLUCOSE BLD-MCNC: 260 MG/DL (ref 70–99)
GLUCOSE BLD-MCNC: 279 MG/DL (ref 70–99)
HCT VFR BLD CALC: 36.1 % (ref 40.5–52.5)
HEMOGLOBIN: 12.6 G/DL (ref 13.5–17.5)
LYMPHOCYTES ABSOLUTE: 1.8 K/UL (ref 1–5.1)
LYMPHOCYTES RELATIVE PERCENT: 18.4 %
MCH RBC QN AUTO: 32.9 PG (ref 26–34)
MCHC RBC AUTO-ENTMCNC: 35 G/DL (ref 31–36)
MCV RBC AUTO: 93.9 FL (ref 80–100)
MONOCYTES ABSOLUTE: 0.9 K/UL (ref 0–1.3)
MONOCYTES RELATIVE PERCENT: 9 %
NEUTROPHILS ABSOLUTE: 6.3 K/UL (ref 1.7–7.7)
NEUTROPHILS RELATIVE PERCENT: 64.7 %
PDW BLD-RTO: 13.1 % (ref 12.4–15.4)
PERFORMED ON: ABNORMAL
PLATELET # BLD: 185 K/UL (ref 135–450)
PMV BLD AUTO: 7.6 FL (ref 5–10.5)
RBC # BLD: 3.84 M/UL (ref 4.2–5.9)
WBC # BLD: 9.7 K/UL (ref 4–11)

## 2019-08-24 PROCEDURE — 6360000002 HC RX W HCPCS: Performed by: PODIATRIST

## 2019-08-24 PROCEDURE — 2580000003 HC RX 258: Performed by: INTERNAL MEDICINE

## 2019-08-24 PROCEDURE — 6370000000 HC RX 637 (ALT 250 FOR IP): Performed by: PODIATRIST

## 2019-08-24 PROCEDURE — 6360000002 HC RX W HCPCS: Performed by: INTERNAL MEDICINE

## 2019-08-24 PROCEDURE — 2580000003 HC RX 258: Performed by: PODIATRIST

## 2019-08-24 PROCEDURE — 85025 COMPLETE CBC W/AUTO DIFF WBC: CPT

## 2019-08-24 PROCEDURE — 99232 SBSQ HOSP IP/OBS MODERATE 35: CPT | Performed by: INTERNAL MEDICINE

## 2019-08-24 PROCEDURE — 6370000000 HC RX 637 (ALT 250 FOR IP): Performed by: INTERNAL MEDICINE

## 2019-08-24 PROCEDURE — 1200000000 HC SEMI PRIVATE

## 2019-08-24 RX ORDER — INSULIN GLARGINE 100 [IU]/ML
32 INJECTION, SOLUTION SUBCUTANEOUS NIGHTLY
Status: DISCONTINUED | OUTPATIENT
Start: 2019-08-24 | End: 2019-08-25

## 2019-08-24 RX ORDER — DIPHENHYDRAMINE HCL 25 MG
25 TABLET ORAL ONCE
Status: COMPLETED | OUTPATIENT
Start: 2019-08-24 | End: 2019-08-24

## 2019-08-24 RX ORDER — SACCHAROMYCES BOULARDII 250 MG
250 CAPSULE ORAL 2 TIMES DAILY
Status: DISCONTINUED | OUTPATIENT
Start: 2019-08-24 | End: 2019-08-26 | Stop reason: HOSPADM

## 2019-08-24 RX ADMIN — INSULIN LISPRO 2 UNITS: 100 INJECTION, SOLUTION INTRAVENOUS; SUBCUTANEOUS at 09:45

## 2019-08-24 RX ADMIN — LOSARTAN POTASSIUM 50 MG: 25 TABLET, FILM COATED ORAL at 09:48

## 2019-08-24 RX ADMIN — FUROSEMIDE 20 MG: 20 TABLET ORAL at 09:00

## 2019-08-24 RX ADMIN — RDII 250 MG CAPSULE 250 MG: at 20:57

## 2019-08-24 RX ADMIN — PIPERACILLIN SODIUM AND TAZOBACTAM SODIUM 3.38 G: 3; .375 INJECTION, POWDER, LYOPHILIZED, FOR SOLUTION INTRAVENOUS at 16:15

## 2019-08-24 RX ADMIN — PAROXETINE HYDROCHLORIDE 10 MG: 20 TABLET, FILM COATED ORAL at 09:48

## 2019-08-24 RX ADMIN — GABAPENTIN 600 MG: 300 CAPSULE ORAL at 20:57

## 2019-08-24 RX ADMIN — DAPTOMYCIN 500 MG: 500 INJECTION, POWDER, LYOPHILIZED, FOR SOLUTION INTRAVENOUS at 13:16

## 2019-08-24 RX ADMIN — INSULIN LISPRO 7 UNITS: 100 INJECTION, SOLUTION INTRAVENOUS; SUBCUTANEOUS at 12:13

## 2019-08-24 RX ADMIN — INSULIN GLARGINE 32 UNITS: 100 INJECTION, SOLUTION SUBCUTANEOUS at 20:58

## 2019-08-24 RX ADMIN — GEMFIBROZIL 600 MG: 600 TABLET ORAL at 07:04

## 2019-08-24 RX ADMIN — METFORMIN HYDROCHLORIDE 500 MG: 500 TABLET ORAL at 17:18

## 2019-08-24 RX ADMIN — INSULIN LISPRO 3 UNITS: 100 INJECTION, SOLUTION INTRAVENOUS; SUBCUTANEOUS at 17:19

## 2019-08-24 RX ADMIN — ENOXAPARIN SODIUM 40 MG: 40 INJECTION SUBCUTANEOUS at 09:49

## 2019-08-24 RX ADMIN — INSULIN LISPRO 7 UNITS: 100 INJECTION, SOLUTION INTRAVENOUS; SUBCUTANEOUS at 09:45

## 2019-08-24 RX ADMIN — SIMVASTATIN 20 MG: 10 TABLET, FILM COATED ORAL at 20:57

## 2019-08-24 RX ADMIN — GLIMEPIRIDE 4 MG: 2 TABLET ORAL at 07:04

## 2019-08-24 RX ADMIN — INSULIN LISPRO 2 UNITS: 100 INJECTION, SOLUTION INTRAVENOUS; SUBCUTANEOUS at 20:58

## 2019-08-24 RX ADMIN — OXYCODONE HYDROCHLORIDE AND ACETAMINOPHEN 2 TABLET: 5; 325 TABLET ORAL at 20:58

## 2019-08-24 RX ADMIN — INSULIN LISPRO 7 UNITS: 100 INJECTION, SOLUTION INTRAVENOUS; SUBCUTANEOUS at 17:19

## 2019-08-24 RX ADMIN — GABAPENTIN 600 MG: 300 CAPSULE ORAL at 14:10

## 2019-08-24 RX ADMIN — CEFEPIME 2 G: 2 INJECTION, POWDER, FOR SOLUTION INTRAVENOUS at 07:03

## 2019-08-24 RX ADMIN — GEMFIBROZIL 600 MG: 600 TABLET ORAL at 16:15

## 2019-08-24 RX ADMIN — DIPHENHYDRAMINE HCL 25 MG: 25 TABLET ORAL at 10:43

## 2019-08-24 RX ADMIN — Medication 1.5 G: at 07:37

## 2019-08-24 RX ADMIN — SODIUM CHLORIDE, POTASSIUM CHLORIDE, SODIUM LACTATE AND CALCIUM CHLORIDE: 600; 310; 30; 20 INJECTION, SOLUTION INTRAVENOUS at 13:16

## 2019-08-24 RX ADMIN — CARVEDILOL 12.5 MG: 6.25 TABLET, FILM COATED ORAL at 17:18

## 2019-08-24 RX ADMIN — INSULIN LISPRO 3 UNITS: 100 INJECTION, SOLUTION INTRAVENOUS; SUBCUTANEOUS at 12:16

## 2019-08-24 RX ADMIN — CARVEDILOL 12.5 MG: 6.25 TABLET, FILM COATED ORAL at 09:48

## 2019-08-24 RX ADMIN — GABAPENTIN 600 MG: 300 CAPSULE ORAL at 09:48

## 2019-08-24 RX ADMIN — ONDANSETRON 4 MG: 2 INJECTION INTRAMUSCULAR; INTRAVENOUS at 09:59

## 2019-08-24 RX ADMIN — Medication 10 ML: at 09:59

## 2019-08-24 RX ADMIN — METFORMIN HYDROCHLORIDE 500 MG: 500 TABLET ORAL at 09:48

## 2019-08-24 RX ADMIN — TIZANIDINE 4 MG: 4 TABLET ORAL at 20:58

## 2019-08-24 ASSESSMENT — PAIN SCALES - GENERAL: PAINLEVEL_OUTOF10: 7

## 2019-08-24 ASSESSMENT — PAIN DESCRIPTION - LOCATION: LOCATION: ANKLE

## 2019-08-24 ASSESSMENT — PAIN DESCRIPTION - DESCRIPTORS: DESCRIPTORS: DISCOMFORT

## 2019-08-24 ASSESSMENT — PAIN DESCRIPTION - PAIN TYPE: TYPE: ACUTE PAIN

## 2019-08-24 ASSESSMENT — PAIN DESCRIPTION - ORIENTATION: ORIENTATION: RIGHT

## 2019-08-24 NOTE — PROGRESS NOTES
Pt in bed with eyes closed, appears in no acute distress, respirations are even and easy. Will continue to monitor. Call light within reach. Report given to Annika PRINCE for transfer of care.

## 2019-08-24 NOTE — PROGRESS NOTES
since quittin.0    Smokeless tobacco: Former User     Quit date: 2005    Tobacco comment: SMOKED OCCASIONALLY UNTIL 8 YEARS AGO   Substance Use Topics    Alcohol use: Yes     Comment: RARELY    Drug use: No       Family History:       Problem Relation Age of Onset    Diabetes Mother     High Blood Pressure Mother     High Cholesterol Mother     Diabetes Father     High Blood Pressure Father     High Cholesterol Father     Stroke Father     High Blood Pressure Sister     High Blood Pressure Maternal Uncle     High Cholesterol Maternal Uncle     High Blood Pressure Maternal Grandmother        Review of Systems    CONSTITUTIONAL:  negative  EYES:  negative  HEENT:  negative  RESPIRATORY:  negative  CARDIOVASCULAR:  negative  GASTROINTESTINAL:  negative  GENITOURINARY:  negative  INTEGUMENT/BREAST:  positive for ulcers  MUSCULOSKELETAL:  positive for  pain  NEUROLOGICAL:  positive for numbness      Objective:   /81   Pulse 105   Temp 97.2 °F (36.2 °C) (Oral)   Resp 16   Ht 6' (1.829 m)   Wt 266 lb (120.7 kg)   SpO2 95%   BMI 36.08 kg/m²     Data:  CBC:   Recent Labs     19  1605 19  0045 19  0600   WBC 10.0 9.8 9.7   HGB 13.9 13.5 12.6*   HCT 39.8* 38.7* 36.1*   MCV 93.8 93.5 93.9    199 185     BMP:   Recent Labs     19  1605 19  0045   * 134*   K 4.1 3.8   CL 98* 98*   CO2 24 25   BUN 24* 23*   CREATININE 0.7* 0.7*     LIVER PROFILE:   Recent Labs     19  1605   AST 9*   ALT 8*   BILITOT 0.8   ALKPHOS 70     PT/INR:   Recent Labs     19  1605   PROT 6.8     HgBA1c:  Lab Results   Component Value Date    LABA1C 8.7 2019       Cultures: wound 19 - skin keith    Imaging: xray  Right foot 19 - postop changes noted. MRI right foot 19 -   There is soft tissue irregularity at the medial aspect of the 1st digit IP   joint.   There is T1 marrow placement and T2 bone marrow edema involving the   1st distal phalanx

## 2019-08-24 NOTE — PROGRESS NOTES
Shift assessment completed. Pt is alert and oriented. Vital signs are WNL. Respirations are even & easy. No complaints voiced. Dressing changed this hafsa and is C, D and I. Reports chronic numbness to all extremities. Reports improvement in throat. Still having some diahrrea. Pt denies needs at this time. SR up x 2 and bed in low position. Call light is within reach. Will monitor.

## 2019-08-25 LAB
BASOPHILS ABSOLUTE: 0.1 K/UL (ref 0–0.2)
BASOPHILS RELATIVE PERCENT: 1.2 %
EOSINOPHILS ABSOLUTE: 0.6 K/UL (ref 0–0.6)
EOSINOPHILS RELATIVE PERCENT: 6.1 %
GLUCOSE BLD-MCNC: 215 MG/DL (ref 70–99)
GLUCOSE BLD-MCNC: 251 MG/DL (ref 70–99)
GLUCOSE BLD-MCNC: 268 MG/DL (ref 70–99)
GLUCOSE BLD-MCNC: 276 MG/DL (ref 70–99)
GLUCOSE BLD-MCNC: 306 MG/DL (ref 70–99)
HCT VFR BLD CALC: 33.9 % (ref 40.5–52.5)
HEMOGLOBIN: 11.9 G/DL (ref 13.5–17.5)
LYMPHOCYTES ABSOLUTE: 2.1 K/UL (ref 1–5.1)
LYMPHOCYTES RELATIVE PERCENT: 21.8 %
MCH RBC QN AUTO: 32.9 PG (ref 26–34)
MCHC RBC AUTO-ENTMCNC: 35.1 G/DL (ref 31–36)
MCV RBC AUTO: 93.9 FL (ref 80–100)
MONOCYTES ABSOLUTE: 1.1 K/UL (ref 0–1.3)
MONOCYTES RELATIVE PERCENT: 11.1 %
NEUTROPHILS ABSOLUTE: 5.7 K/UL (ref 1.7–7.7)
NEUTROPHILS RELATIVE PERCENT: 59.8 %
PDW BLD-RTO: 13.4 % (ref 12.4–15.4)
PERFORMED ON: ABNORMAL
PLATELET # BLD: 173 K/UL (ref 135–450)
PMV BLD AUTO: 7.5 FL (ref 5–10.5)
RBC # BLD: 3.61 M/UL (ref 4.2–5.9)
WBC # BLD: 9.5 K/UL (ref 4–11)

## 2019-08-25 PROCEDURE — 6370000000 HC RX 637 (ALT 250 FOR IP): Performed by: PODIATRIST

## 2019-08-25 PROCEDURE — 1200000000 HC SEMI PRIVATE

## 2019-08-25 PROCEDURE — 99232 SBSQ HOSP IP/OBS MODERATE 35: CPT | Performed by: INTERNAL MEDICINE

## 2019-08-25 PROCEDURE — 2580000003 HC RX 258: Performed by: INTERNAL MEDICINE

## 2019-08-25 PROCEDURE — 85025 COMPLETE CBC W/AUTO DIFF WBC: CPT

## 2019-08-25 PROCEDURE — 6360000002 HC RX W HCPCS: Performed by: PODIATRIST

## 2019-08-25 PROCEDURE — 6370000000 HC RX 637 (ALT 250 FOR IP): Performed by: INTERNAL MEDICINE

## 2019-08-25 PROCEDURE — 6360000002 HC RX W HCPCS: Performed by: INTERNAL MEDICINE

## 2019-08-25 PROCEDURE — 2580000003 HC RX 258: Performed by: PODIATRIST

## 2019-08-25 RX ORDER — INSULIN GLARGINE 100 [IU]/ML
38 INJECTION, SOLUTION SUBCUTANEOUS NIGHTLY
Status: DISCONTINUED | OUTPATIENT
Start: 2019-08-25 | End: 2019-08-26 | Stop reason: HOSPADM

## 2019-08-25 RX ORDER — LOPERAMIDE HYDROCHLORIDE 2 MG/1
2 CAPSULE ORAL 2 TIMES DAILY PRN
Status: DISCONTINUED | OUTPATIENT
Start: 2019-08-25 | End: 2019-08-26 | Stop reason: HOSPADM

## 2019-08-25 RX ORDER — SODIUM CHLORIDE 9 MG/ML
INJECTION, SOLUTION INTRAVENOUS
Status: DISPENSED
Start: 2019-08-25 | End: 2019-08-26

## 2019-08-25 RX ADMIN — RDII 250 MG CAPSULE 250 MG: at 20:09

## 2019-08-25 RX ADMIN — ONDANSETRON 4 MG: 2 INJECTION INTRAMUSCULAR; INTRAVENOUS at 11:18

## 2019-08-25 RX ADMIN — CARVEDILOL 12.5 MG: 6.25 TABLET, FILM COATED ORAL at 08:46

## 2019-08-25 RX ADMIN — LOSARTAN POTASSIUM 50 MG: 25 TABLET, FILM COATED ORAL at 08:46

## 2019-08-25 RX ADMIN — INSULIN LISPRO 3 UNITS: 100 INJECTION, SOLUTION INTRAVENOUS; SUBCUTANEOUS at 08:46

## 2019-08-25 RX ADMIN — RDII 250 MG CAPSULE 250 MG: at 08:47

## 2019-08-25 RX ADMIN — INSULIN LISPRO 7 UNITS: 100 INJECTION, SOLUTION INTRAVENOUS; SUBCUTANEOUS at 12:05

## 2019-08-25 RX ADMIN — GABAPENTIN 600 MG: 300 CAPSULE ORAL at 16:16

## 2019-08-25 RX ADMIN — GEMFIBROZIL 600 MG: 600 TABLET ORAL at 16:16

## 2019-08-25 RX ADMIN — SIMVASTATIN 20 MG: 10 TABLET, FILM COATED ORAL at 20:01

## 2019-08-25 RX ADMIN — INSULIN GLARGINE 38 UNITS: 100 INJECTION, SOLUTION SUBCUTANEOUS at 20:10

## 2019-08-25 RX ADMIN — CARVEDILOL 12.5 MG: 6.25 TABLET, FILM COATED ORAL at 17:13

## 2019-08-25 RX ADMIN — PIPERACILLIN SODIUM AND TAZOBACTAM SODIUM 3.38 G: 3; .375 INJECTION, POWDER, LYOPHILIZED, FOR SOLUTION INTRAVENOUS at 08:51

## 2019-08-25 RX ADMIN — FUROSEMIDE 20 MG: 20 TABLET ORAL at 08:46

## 2019-08-25 RX ADMIN — INSULIN LISPRO 7 UNITS: 100 INJECTION, SOLUTION INTRAVENOUS; SUBCUTANEOUS at 08:45

## 2019-08-25 RX ADMIN — DAPTOMYCIN 500 MG: 500 INJECTION, POWDER, LYOPHILIZED, FOR SOLUTION INTRAVENOUS at 13:43

## 2019-08-25 RX ADMIN — GABAPENTIN 600 MG: 300 CAPSULE ORAL at 08:46

## 2019-08-25 RX ADMIN — INSULIN LISPRO 2 UNITS: 100 INJECTION, SOLUTION INTRAVENOUS; SUBCUTANEOUS at 20:10

## 2019-08-25 RX ADMIN — ENOXAPARIN SODIUM 40 MG: 40 INJECTION SUBCUTANEOUS at 08:52

## 2019-08-25 RX ADMIN — GLIMEPIRIDE 4 MG: 2 TABLET ORAL at 06:22

## 2019-08-25 RX ADMIN — GEMFIBROZIL 600 MG: 600 TABLET ORAL at 06:22

## 2019-08-25 RX ADMIN — GABAPENTIN 600 MG: 300 CAPSULE ORAL at 20:01

## 2019-08-25 RX ADMIN — PAROXETINE HYDROCHLORIDE 10 MG: 20 TABLET, FILM COATED ORAL at 08:47

## 2019-08-25 RX ADMIN — INSULIN LISPRO 4 UNITS: 100 INJECTION, SOLUTION INTRAVENOUS; SUBCUTANEOUS at 12:06

## 2019-08-25 RX ADMIN — Medication 10 ML: at 20:01

## 2019-08-25 RX ADMIN — Medication 10 ML: at 08:52

## 2019-08-25 RX ADMIN — TIZANIDINE 4 MG: 4 TABLET ORAL at 20:01

## 2019-08-25 RX ADMIN — INSULIN LISPRO 7 UNITS: 100 INJECTION, SOLUTION INTRAVENOUS; SUBCUTANEOUS at 17:14

## 2019-08-25 RX ADMIN — PIPERACILLIN SODIUM AND TAZOBACTAM SODIUM 3.38 G: 3; .375 INJECTION, POWDER, LYOPHILIZED, FOR SOLUTION INTRAVENOUS at 00:29

## 2019-08-25 RX ADMIN — INSULIN LISPRO 3 UNITS: 100 INJECTION, SOLUTION INTRAVENOUS; SUBCUTANEOUS at 17:14

## 2019-08-25 RX ADMIN — OXYCODONE HYDROCHLORIDE AND ACETAMINOPHEN 1 TABLET: 5; 325 TABLET ORAL at 20:01

## 2019-08-25 ASSESSMENT — PAIN DESCRIPTION - PAIN TYPE: TYPE: ACUTE PAIN

## 2019-08-25 ASSESSMENT — PAIN - FUNCTIONAL ASSESSMENT: PAIN_FUNCTIONAL_ASSESSMENT: PREVENTS OR INTERFERES WITH MANY ACTIVE NOT PASSIVE ACTIVITIES

## 2019-08-25 ASSESSMENT — PAIN DESCRIPTION - LOCATION: LOCATION: HEAD

## 2019-08-25 ASSESSMENT — PAIN SCALES - GENERAL: PAINLEVEL_OUTOF10: 6

## 2019-08-25 NOTE — PROGRESS NOTES
ID Follow-up NOTE    CC: osteomyelitis of R hallux. Subjective:     Patient's allergic symptoms associated with antibiotics appear to have resolved completely. Patient denies nausea. He is having diarrhea; says he had diarrhea in past due to metformin. Objective:     Patient Vitals for the past 24 hrs:   BP Temp Temp src Pulse Resp SpO2   19 0817 114/66 98.2 °F (36.8 °C) Oral 92 18 94 %   19 0323 122/71 97.5 °F (36.4 °C) Oral 97 16 94 %   19 2306 129/77 97.9 °F (36.6 °C) Oral 113 16 93 %   19 1931 134/89 97.5 °F (36.4 °C) Oral 103 16 95 %   19 1615 130/81 97.2 °F (36.2 °C) Oral 105 16 95 %   19 1108 (!) 140/93 98.5 °F (36.9 °C) Oral 98 18 96 %     Temp (24hrs), Av.8 °F (36.6 °C), Min:97.2 °F (36.2 °C), Max:98.5 °F (36.9 °C)          EXAM:  HEENT: no edema of eyelids   General: alert appropriate afebrile      LUNGS: Resp unlabored; clear to auscultation     CV: RR s M     ABD: soft, non-tender, without mass     EXT: without edema; dressing over R distal foot is dry. No erythema more proximally. Skin: no rash      Data Review:    Lab Results   Component Value Date    WBC 9.5 2019    HGB 11.9 (L) 2019    HCT 33.9 (L) 2019    MCV 93.9 2019     2019     Lab Results   Component Value Date    CREATININE 0.7 (L) 2019    BUN 23 (H) 2019     (L) 2019    K 3.8 2019    CL 98 (L) 2019    CO2 25 2019     Lab Results   Component Value Date    ALT 8 (L) 2019    AST 9 (L) 2019    ALKPHOS 70 2019    BILITOT 0.8 2019         MICRO: surgical culture in progress. IMAGING: osteo R great toe.      Assessment:     Active Problems:    Hypertension    Cardiomyopathy ([address]    Mixed hyperlipidemia    Neuropathic diabetic ulcer of foot ([address]    Diabetic ulcer of toe of right foot associated with type 2 diabetes mellitus ([address]    Osteomyelitis due to type 2 diabetes mellitus ([address]    Diabetic

## 2019-08-26 VITALS
WEIGHT: 266 LBS | TEMPERATURE: 97.4 F | BODY MASS INDEX: 36.03 KG/M2 | RESPIRATION RATE: 16 BRPM | OXYGEN SATURATION: 97 % | HEIGHT: 72 IN | HEART RATE: 82 BPM | SYSTOLIC BLOOD PRESSURE: 124 MMHG | DIASTOLIC BLOOD PRESSURE: 88 MMHG

## 2019-08-26 LAB
ANION GAP SERPL CALCULATED.3IONS-SCNC: 7 MMOL/L (ref 3–16)
BASOPHILS ABSOLUTE: 0.1 K/UL (ref 0–0.2)
BASOPHILS RELATIVE PERCENT: 0.8 %
BUN BLDV-MCNC: 13 MG/DL (ref 7–20)
CALCIUM SERPL-MCNC: 8.9 MG/DL (ref 8.3–10.6)
CHLORIDE BLD-SCNC: 104 MMOL/L (ref 99–110)
CO2: 26 MMOL/L (ref 21–32)
CREAT SERPL-MCNC: 0.6 MG/DL (ref 0.9–1.3)
EOSINOPHILS ABSOLUTE: 0.4 K/UL (ref 0–0.6)
EOSINOPHILS RELATIVE PERCENT: 5.6 %
GFR AFRICAN AMERICAN: >60
GFR NON-AFRICAN AMERICAN: >60
GLUCOSE BLD-MCNC: 207 MG/DL (ref 70–99)
GLUCOSE BLD-MCNC: 210 MG/DL (ref 70–99)
GLUCOSE BLD-MCNC: 216 MG/DL (ref 70–99)
GLUCOSE BLD-MCNC: 295 MG/DL (ref 70–99)
HCT VFR BLD CALC: 35.8 % (ref 40.5–52.5)
HEMOGLOBIN: 12.5 G/DL (ref 13.5–17.5)
LYMPHOCYTES ABSOLUTE: 2.2 K/UL (ref 1–5.1)
LYMPHOCYTES RELATIVE PERCENT: 27.5 %
MCH RBC QN AUTO: 32.8 PG (ref 26–34)
MCHC RBC AUTO-ENTMCNC: 34.9 G/DL (ref 31–36)
MCV RBC AUTO: 94 FL (ref 80–100)
MONOCYTES ABSOLUTE: 0.8 K/UL (ref 0–1.3)
MONOCYTES RELATIVE PERCENT: 9.8 %
NEUTROPHILS ABSOLUTE: 4.5 K/UL (ref 1.7–7.7)
NEUTROPHILS RELATIVE PERCENT: 56.3 %
PDW BLD-RTO: 13.4 % (ref 12.4–15.4)
PERFORMED ON: ABNORMAL
PLATELET # BLD: 180 K/UL (ref 135–450)
PMV BLD AUTO: 7.6 FL (ref 5–10.5)
POTASSIUM SERPL-SCNC: 4.2 MMOL/L (ref 3.5–5.1)
RBC # BLD: 3.8 M/UL (ref 4.2–5.9)
SODIUM BLD-SCNC: 137 MMOL/L (ref 136–145)
WBC # BLD: 8 K/UL (ref 4–11)

## 2019-08-26 PROCEDURE — 2580000003 HC RX 258: Performed by: PODIATRIST

## 2019-08-26 PROCEDURE — 99239 HOSP IP/OBS DSCHRG MGMT >30: CPT | Performed by: INTERNAL MEDICINE

## 2019-08-26 PROCEDURE — 80048 BASIC METABOLIC PNL TOTAL CA: CPT

## 2019-08-26 PROCEDURE — 6360000002 HC RX W HCPCS: Performed by: PODIATRIST

## 2019-08-26 PROCEDURE — 85025 COMPLETE CBC W/AUTO DIFF WBC: CPT

## 2019-08-26 PROCEDURE — 6370000000 HC RX 637 (ALT 250 FOR IP): Performed by: PODIATRIST

## 2019-08-26 PROCEDURE — 6370000000 HC RX 637 (ALT 250 FOR IP): Performed by: INTERNAL MEDICINE

## 2019-08-26 RX ORDER — AMOXICILLIN AND CLAVULANATE POTASSIUM 500; 125 MG/1; MG/1
1 TABLET, FILM COATED ORAL 3 TIMES DAILY
Qty: 15 TABLET | Refills: 0 | Status: SHIPPED | OUTPATIENT
Start: 2019-08-26 | End: 2019-09-05

## 2019-08-26 RX ADMIN — PAROXETINE HYDROCHLORIDE 10 MG: 20 TABLET, FILM COATED ORAL at 07:37

## 2019-08-26 RX ADMIN — GABAPENTIN 600 MG: 300 CAPSULE ORAL at 07:38

## 2019-08-26 RX ADMIN — GEMFIBROZIL 600 MG: 600 TABLET ORAL at 06:23

## 2019-08-26 RX ADMIN — FUROSEMIDE 20 MG: 20 TABLET ORAL at 07:37

## 2019-08-26 RX ADMIN — INSULIN LISPRO 7 UNITS: 100 INJECTION, SOLUTION INTRAVENOUS; SUBCUTANEOUS at 07:42

## 2019-08-26 RX ADMIN — ENOXAPARIN SODIUM 40 MG: 40 INJECTION SUBCUTANEOUS at 07:37

## 2019-08-26 RX ADMIN — INSULIN LISPRO 2 UNITS: 100 INJECTION, SOLUTION INTRAVENOUS; SUBCUTANEOUS at 07:39

## 2019-08-26 RX ADMIN — INSULIN LISPRO 7 UNITS: 100 INJECTION, SOLUTION INTRAVENOUS; SUBCUTANEOUS at 11:24

## 2019-08-26 RX ADMIN — INSULIN LISPRO 3 UNITS: 100 INJECTION, SOLUTION INTRAVENOUS; SUBCUTANEOUS at 11:27

## 2019-08-26 RX ADMIN — RDII 250 MG CAPSULE 250 MG: at 07:37

## 2019-08-26 RX ADMIN — GLIMEPIRIDE 4 MG: 2 TABLET ORAL at 06:23

## 2019-08-26 RX ADMIN — LOSARTAN POTASSIUM 50 MG: 25 TABLET, FILM COATED ORAL at 07:37

## 2019-08-26 RX ADMIN — CARVEDILOL 12.5 MG: 6.25 TABLET, FILM COATED ORAL at 07:37

## 2019-08-26 RX ADMIN — Medication 10 ML: at 07:37

## 2019-08-26 NOTE — PLAN OF CARE
Care plan ongoing
Care plans continue.
and Phase II process. 23.  Patient pain level is established preoperatively using age appropriate pain scale. 24.  The patient will move to fall risk upon sedation- during and through the recovery phase. Interventions- orient the patient to the environment, especially the location of the bathroom; provide treaded socks/non-skid footwear; demonstrate and teach back use of the nurse's call system; instruct the patient to call for help before getting out of bed; lock all movable equipment before transferring patient; keep bed in lowest position possible.  25.  Other:

## 2019-08-26 NOTE — PROGRESS NOTES
Rosette Stephens MD related to pt going home with [facility]. CM unsure and MD asked to call her back. Will monitor.

## 2019-08-26 NOTE — DISCHARGE INSTR - COC
therapy    Patient's personal belongings (please select all that are sent with patient):  None    RN SIGNATURE:  Electronically signed by Troy Cantu RN on 8/26/19 at 9:51 AM    CASE MANAGEMENT/SOCIAL WORK SECTION    Inpatient Status Date: ***    Readmission Risk Assessment Score:  Readmission Risk              Risk of Unplanned Readmission:        13           Discharging to Facility/ Agency   · Name:   · Address:  · Phone:  · Fax:    Dialysis Facility (if applicable)   · Name:  · Address:  · Dialysis Schedule:  · Phone:  · Fax:    / signature: {Esignature:[id number]}    PHYSICIAN SECTION    Prognosis: {Prognosis:4521964828}    Condition at Discharge: Shelia Langston Cristian Patient Condition:[id number]}    Rehab Potential (if transferring to Rehab): {Prognosis:5514453576}    Recommended Labs or Other Treatments After Discharge: ***    Physician Certification: I certify the above information and transfer of Perfecto Estes  is necessary for the continuing treatment of the diagnosis listed and that he requires {Admit to Appropriate Level of Care:82749} for {GREATER/LESS:228055589} 30 days.      Update Admission H&P: {CHP DME Changes in TJGX:522625709}    PHYSICIAN SIGNATURE:  {Esignature:[id number]}

## 2019-08-26 NOTE — CARE COORDINATION
DISCHARGE ORDER  Date/Time 2019 1:34 PM  Completed by: Ranjana Velásquez, Case Management    Patient Name: Cristobal Kraus    : 1975  Admitting Diagnosis: Diabetic foot infection ([facility] [E81.267, L08.9]  Admit Date/Time: 2019  3:37 PM    Noted discharge order. Confirmed discharge plan with patient / family (wife): Yes   Discharge Plan: Order for dc noted. Spoke with pt who cont plan for home. Noted pt was active with [facility] prior to admit but will go home on po ABx and no longer requires Home IVABx or HHC per MD. Pt aware and already has F/U scheduled with [address] [place] [address],[address] at [facility]. Chart reviewed and no other dc needs identified. Discharge timeout done with nsg, CM and pt. All discharge needs and concerns addressed.

## 2019-08-26 NOTE — DISCHARGE SUMMARY
times daily (before meals)  What changed:  how much to take        CONTINUE taking these medications    * blood glucose test strips strip  Commonly known as:  ASCENSIA AUTODISC VI;ONE TOUCH ULTRA TEST VI  1 each by In Vitro route daily. As needed. * blood glucose test strips strip  Commonly known as:  ASCENSIA AUTODISC VI;ONE TOUCH ULTRA TEST VI  USE TO TEST BLOOD SUGAR LEVELS 3 TIMES DAILY     carvedilol 12.5 MG tablet  Commonly known as:  COREG  Take 1 tablet by mouth 2 times daily (with meals)     furosemide 20 MG tablet  Commonly known as:  LASIX  Take 1 tablet by mouth daily     gabapentin 300 MG capsule  Commonly known as:  NEURONTIN  Take 2 capsules by mouth 3 times daily for 30 days. gemfibrozil 600 MG tablet  Commonly known as:  LOPID  Take 1 tablet by mouth 2 times daily (before meals)     Insulin Syringe-Needle U-100 29G X 1/2\" 0.3 ML Misc  1 each by Does not apply route daily     Lancets Misc  Test three times a day with Contour Next EZ machine. DX: E11.9     losartan 50 MG tablet  Commonly known as:  COZAAR  TAKE 1 TABLET BY MOUTH DAILY     PARoxetine 10 MG tablet  Commonly known as:  PAXIL  TAKE 1 TABLET BY MOUTH EVERY MORNING     sildenafil 100 MG tablet  Commonly known as:  VIAGRA  Take 1 tablet by mouth as needed for Erectile Dysfunction     simvastatin 20 MG tablet  Commonly known as:  ZOCOR  TAKE ONE TABLET BY MOUTH ONCE NIGHTLY         * This list has 2 medication(s) that are the same as other medications prescribed for you. Read the directions carefully, and ask your doctor or other care provider to review them with you.             STOP taking these medications    cefTRIAXone 2 g injection  Commonly known as:  ROCEPHIN     glimepiride 4 MG tablet  Commonly known as:  AMARYL     metFORMIN 1000 MG tablet  Commonly known as:  GLUCOPHAGE     metroNIDAZOLE 500 MG tablet  Commonly known as:  FLAGYL           Where to Get Your Medications      These medications were sent to [facility]

## 2019-08-27 ENCOUNTER — TELEPHONE (OUTPATIENT)
Dept: INTERNAL MEDICINE CLINIC | Age: 44
End: 2019-08-27

## 2019-08-27 LAB
BLOOD CULTURE, ROUTINE: NORMAL
CULTURE, BLOOD 2: NORMAL

## 2019-08-27 RX ORDER — SILDENAFIL 100 MG/1
100 TABLET, FILM COATED ORAL PRN
Qty: 15 TABLET | Refills: 0 | Status: ON HOLD | OUTPATIENT
Start: 2019-08-27 | End: 2022-01-01 | Stop reason: HOSPADM

## 2019-08-28 LAB
ANAEROBIC CULTURE: NORMAL
CULTURE SURGICAL: NORMAL
GRAM STAIN RESULT: NORMAL

## 2019-08-28 NOTE — OP NOTE
[address]                 [address] [place] [address]                                OPERATIVE REPORT    PATIENT NAME: Sriram Luevano                   :        1975  MED REC NO:   [id number]                          ROOM:       0230  ACCOUNT NO:   [de-identified]                           ADMIT DATE: 2019  PROVIDER:     Duncan Esteban DPM    DATE OF PROCEDURE:  2019    PREOPERATIVE DIAGNOSES:  1.  Osteomyelitis, right hallux. 2.  Diabetic foot ulceration, right foot. 3.  Diabetes mellitus, uncontrolled. POSTOPERATIVE DIAGNOSES:  1.  Osteomyelitis, right hallux. 2.  Diabetic foot ulceration, right foot. 3.  Diabetes mellitus, uncontrolled. NAME OF PROCEDURE:  Partial right foot amputation. SURGEON:  Duncan Esteban DPM    ANESTHESIA:  Local with MAC. HEMOSTASIS:  Ankle tourniquet to 250 mmHg. ESTIMATED BLOOD LOSS:  Less than 50 mL. REPORT OF OPERATION:  The patient was brought in to the operating room  and placed on the operating table in supine position. Under mild IV  sedation, right first ray was anesthetized with 20 mL of 1:1 mixture of  1% lidocaine plain and 0.5% Marcaine plain. The foot was then scrubbed,  prepped, and draped in usual sterile manner. Esmarch was then utilized  to exsanguinate the right foot. An ankle tourniquet was then inflated  to 250 mmHg. Attention was then directed to the right foot where the ulcerations were  identified on the hallux. Blunt probing of this area did reveal obvious  bone that was exposed. At this time, an elliptical incision was then  made just distal to the base of the hallux. Dissection was carried down  in order to expose the extensor and flexor tendons. These were then  transected. Soft tissues were then dissected free from the distal  aspect to the hallux. The distal portion of the hallux was then  dissected free and passed off the operative field in toto.   There

## 2019-08-29 ENCOUNTER — HOSPITAL ENCOUNTER (OUTPATIENT)
Dept: WOUND CARE | Age: 44
Discharge: HOME | End: 2019-08-29
Payer: COMMERCIAL

## 2019-08-29 ENCOUNTER — OFFICE VISIT (OUTPATIENT)
Dept: INTERNAL MEDICINE CLINIC | Age: 44
End: 2019-08-29

## 2019-08-29 VITALS
BODY MASS INDEX: 35.49 KG/M2 | TEMPERATURE: 98 F | HEIGHT: 72 IN | DIASTOLIC BLOOD PRESSURE: 65 MMHG | SYSTOLIC BLOOD PRESSURE: 105 MMHG | WEIGHT: 262 LBS | HEART RATE: 80 BPM

## 2019-08-29 VITALS
RESPIRATION RATE: 18 BRPM | BODY MASS INDEX: 36.11 KG/M2 | SYSTOLIC BLOOD PRESSURE: 105 MMHG | DIASTOLIC BLOOD PRESSURE: 74 MMHG | TEMPERATURE: 99.1 F | WEIGHT: 266.6 LBS | HEART RATE: 100 BPM | HEIGHT: 72 IN

## 2019-08-29 DIAGNOSIS — E11.42 TYPE 2 DIABETES MELLITUS WITH DIABETIC POLYNEUROPATHY, WITH LONG-TERM CURRENT USE OF INSULIN (HCC): ICD-10-CM

## 2019-08-29 DIAGNOSIS — Z89.431 PARTIAL NONTRAUMATIC AMPUTATION OF RIGHT FOOT (HCC): ICD-10-CM

## 2019-08-29 DIAGNOSIS — Z79.4 TYPE 2 DIABETES MELLITUS WITH DIABETIC POLYNEUROPATHY, WITH LONG-TERM CURRENT USE OF INSULIN (HCC): ICD-10-CM

## 2019-08-29 DIAGNOSIS — L08.9 DIABETIC FOOT INFECTION (HCC): Primary | ICD-10-CM

## 2019-08-29 DIAGNOSIS — E11.628 DIABETIC FOOT INFECTION (HCC): Primary | ICD-10-CM

## 2019-08-29 PROCEDURE — 2022F DILAT RTA XM EVC RTNOPTHY: CPT | Performed by: PHYSICIAN ASSISTANT

## 2019-08-29 PROCEDURE — 1111F DSCHRG MED/CURRENT MED MERGE: CPT | Performed by: PHYSICIAN ASSISTANT

## 2019-08-29 PROCEDURE — 99202 OFFICE O/P NEW SF 15 MIN: CPT | Performed by: NURSE PRACTITIONER

## 2019-08-29 PROCEDURE — 97597 DBRDMT OPN WND 1ST 20 CM/<: CPT | Performed by: NURSE PRACTITIONER

## 2019-08-29 PROCEDURE — 97597 DBRDMT OPN WND 1ST 20 CM/<: CPT

## 2019-08-29 PROCEDURE — 17250 CHEM CAUT OF GRANLTJ TISSUE: CPT

## 2019-08-29 PROCEDURE — 99214 OFFICE O/P EST MOD 30 MIN: CPT | Performed by: PHYSICIAN ASSISTANT

## 2019-08-29 PROCEDURE — G8417 CALC BMI ABV UP PARAM F/U: HCPCS | Performed by: PHYSICIAN ASSISTANT

## 2019-08-29 PROCEDURE — G8427 DOCREV CUR MEDS BY ELIG CLIN: HCPCS | Performed by: PHYSICIAN ASSISTANT

## 2019-08-29 PROCEDURE — 1036F TOBACCO NON-USER: CPT | Performed by: PHYSICIAN ASSISTANT

## 2019-08-29 PROCEDURE — 3045F PR MOST RECENT HEMOGLOBIN A1C LEVEL 7.0-9.0%: CPT | Performed by: PHYSICIAN ASSISTANT

## 2019-08-29 RX ORDER — LIDOCAINE 40 MG/G
CREAM TOPICAL PRN
Status: DISCONTINUED | OUTPATIENT
Start: 2019-08-29 | End: 2019-08-30 | Stop reason: HOSPADM

## 2019-08-29 ASSESSMENT — ENCOUNTER SYMPTOMS
VOMITING: 0
SHORTNESS OF BREATH: 0
NAUSEA: 0

## 2019-08-29 NOTE — PLAN OF CARE
[facility] Summary     1. General --     Active wound etiologies:                     diabetic (Reyna grade 3). PCP and pertinent consultants: Kajal Hernandez MD         Other plans for overall health:                        Blood sugar control-new orders from PCP for medication adjustment on 19     [address]:                        [facility] for IV therapy                                                             [facility] for IV medications     Wound Care Supplies:                        provided by home care company     Most recent [facility] lab results:               Lab Results   Component Value Date     LABA1C 9.3 2019                Lab Results   Component Value Date     LABALBU 3.9 2019                Lab Results   Component Value Date     CREATININE 0.7 (L) 2019                Lab Results   Component Value Date     HGB 13.3 (L) 2019         2. Circulatory status, if lower extremity ulcer --     Most recent JOEL (date, 19             Right JOEL: 1.15 mmHg     Left JOEL: 1.09 mmHg     Most recent arterial imaging:              none     Most recent arterial Rx:                      none     Current management of edema:        N/A -- no edema present. Most recent venous imaging:              none     3. Debridement --     Debridement methods, past or present:         sharp. Pertinent surgical history for wound(s):         Right great  toe amputation      4.          Infection --      Recent culture results:            19 no growth (patient had been on antibiotics)     Recent Imagin/5/19 MRI                                                   19 xray of right foot/toes     Recent antibiotic Rx:   Vancomycin 1750mg Q12H  19 - 8/3/19                                      Zosyn  3.375mg Q8h 19 - 19                                      Vancomycin 1250 mg Q8H 8/3/19 - 8/7/19                                       Rocephin 2G IV Q24H 8/7/19 to present (8/8/19                                       Flagyl 500mg 1 po QID 8/7/19 to present (8/8/19        5. Topical therapies --     Previous dressings on current wound(s):            Current dressings being used:     8/29/19                          Right great toe amputation site (Incision):    Apply Betadine Ointment to incision line  Cover with Adaptic  Cover with dry gauze  ABD pad  Kerlix roll gauze  Ace wrap for protection only ---no compression  Every other day    Post surgical shoe, wear when walking Do not drive with this shoe on     6. Offloading --     Pressure ulcer offloading:       N/a     Neuropathic ulcer offloading:  N/a .     7. Adjunctive therapies --      Date           Wnd #        Location                      CTP Rx           HBOT              NPWT                                               Patient seen today per EVANS Vasquez, wound debridement performed, dressings to wound per order, continue offloading surgical shoe, continue augmentin po as ordered per Dr. Mindy Vega. Instructed patient to call his office for f/u.   F/u WCC x 1 week, MD orders/D/C instructions reviewed with patient, all questions answered; copy of instructions given to patient

## 2019-08-29 NOTE — PROGRESS NOTES
tablet by mouth 3 times daily for 5 days             blood glucose test strips (CONTOUR NEXT TEST) strip  USE TO TEST BLOOD SUGAR LEVELS 3 TIMES DAILY             carvedilol (COREG) 12.5 MG tablet  Take 1 tablet by mouth 2 times daily (with meals)             furosemide (LASIX) 20 MG tablet  Take 1 tablet by mouth daily             gabapentin (NEURONTIN) 300 MG capsule  Take 2 capsules by mouth 3 times daily for 30 days. gemfibrozil (LOPID) 600 MG tablet  Take 1 tablet by mouth 2 times daily (before meals)             glucose blood VI test strips (EXACTECH TEST) strip  1 each by In Vitro route daily. As needed. insulin glargine (TOUJEO SOLOSTAR) 300 UNIT/ML injection pen  Inject 45 Units into the skin nightly             insulin lispro (HUMALOG KWIKPEN) 100 UNIT/ML pen  Inject 15 Units into the skin 3 times daily (before meals)             Insulin Syringe-Needle U-100 ([employer] INS SYR .3CC/29GX0.5\") 29G X 1/2\" 0.3 ML MISC  1 each by Does not apply route daily             Lancets MISC  Test three times a day with Contour Next EZ machine.   DX: E11.9             losartan (COZAAR) 50 MG tablet  TAKE 1 TABLET BY MOUTH DAILY             PARoxetine (PAXIL) 10 MG tablet  TAKE 1 TABLET BY MOUTH EVERY MORNING             sildenafil (VIAGRA) 100 MG tablet  TAKE 1 TABLET BY MOUTH AS NEEDED FOR ERECTILE DYSFUNCTION             simvastatin (ZOCOR) 20 MG tablet  TAKE ONE TABLET BY MOUTH ONCE NIGHTLY                   Medications marked \"taking\" at this time  Outpatient Medications Marked as Taking for the 8/29/19 encounter (Office Visit) with Analisa Lama PA-C   Medication Sig Dispense Refill    insulin glargine (TOUJEO SOLOSTAR) 300 UNIT/ML injection pen Inject 45 Units into the skin nightly 5 pen 3    sildenafil (VIAGRA) 100 MG tablet TAKE 1 TABLET BY MOUTH AS NEEDED FOR ERECTILE DYSFUNCTION 15 tablet 0    insulin lispro (HUMALOG KWIKPEN) 100 UNIT/ML pen Inject 15 Units into the skin 3 times daily (before meals) 5 pen 0    amoxicillin-clavulanate (AUGMENTIN) 500-125 MG per tablet Take 1 tablet by mouth 3 times daily for 5 days 15 tablet 0    furosemide (LASIX) 20 MG tablet Take 1 tablet by mouth daily 30 tablet 2    simvastatin (ZOCOR) 20 MG tablet TAKE ONE TABLET BY MOUTH ONCE NIGHTLY 30 tablet 2    gabapentin (NEURONTIN) 300 MG capsule Take 2 capsules by mouth 3 times daily for 30 days. 180 capsule 0    losartan (COZAAR) 50 MG tablet TAKE 1 TABLET BY MOUTH DAILY 30 tablet 2    PARoxetine (PAXIL) 10 MG tablet TAKE 1 TABLET BY MOUTH EVERY MORNING 30 tablet 2    gemfibrozil (LOPID) 600 MG tablet Take 1 tablet by mouth 2 times daily (before meals) 60 tablet 3    carvedilol (COREG) 12.5 MG tablet Take 1 tablet by mouth 2 times daily (with meals) 60 tablet 2        Medications patient taking as of now reconciled against medications ordered at time of hospital discharge: Yes    Chief Complaint   Patient presents with    Follow-Up from [facility][1] was admitted to the hospital with worsening of his right great toe cellulitis, subsequent development of osteomyelitis, and he underwent a partial right foot amputation. His insulin doses were adjusted and his glimepiride was discontinued. He was discharged home 3 days ago. Since discharge he has been doing well. He states that his blood sugar has not dropped below 110. He thinks that when his blood sugar gets to the low 100s he feels symptoms of hypoglycemia because he is not used to having blood sugar that well controlled. He denies any significant hypoglycemia. He has an appointment with podiatry today. Inpatient course: Discharge summary reviewed- see chart. Interval history/Current status: stable     Review of Systems   Constitutional: Negative for chills and fever. Respiratory: Negative for shortness of breath. Cardiovascular: Negative for leg swelling. Gastrointestinal: Negative for nausea and vomiting.    Neurological:

## 2019-08-29 NOTE — PATIENT INSTRUCTIONS
know what to do to get your sugar level up. · Wear medical alert jewelry that lists your condition. You can buy this at most drugstores. When should you call for help? Call 911 anytime you think you may need emergency care. For example, call if:    · You passed out (lost consciousness).     · You are confused or cannot think clearly.     · Your blood sugar is very high or very low.    Watch closely for changes in your health, and be sure to contact your doctor if:    · Your blood sugar stays outside the level your doctor set for you.     · You have any problems. Where can you learn more? Go to [email or web address] and sign in to your [employer] account. Enter K886 in the [place]on box to learn more about \"Hypoglycemia: Care Instructions. \"     If you do not have an account, please click on the \"Sign Up Now\" link. Current as of: July 25, 2018  Content Version: 12.1  © 4445-6977 Healthwise, Incorporated. Care instructions adapted under license by [facility] [address] If you have questions about a medical condition or this instruction, always ask your healthcare professional. [place] [address] any warranty or liability for your use of this information.        Do not take your humalog if you are not planning to eat

## 2019-08-29 NOTE — PROGRESS NOTES
hemostasis was by pressure. The patient tolerated the procedure well, with no significant complications. The patient's level of pain during and after the procedure was monitored, and is noted in the wound documentation flowsheet. Discharge plan:     Treatment in the wound care center today: Incision 08/23/19 Foot Right-Dressing/Treatment: Other (comment)(betadine,4x4,kerlix,ABD,Ace Wrap ). Home treatment: good handwashing before and after any dressing changes. Cleanse ulcer with saline or soap & water before dressing change. May use Vaseline (petrolatum), Aquaphor, Aveeno, CeraVe, Cetaphil, Eucerin, Lubriderm, etc for dry skin. Dressing type for home: As noted above    Written discharge instructions given to patient. Notify if change in condition. Follow up in 1 week.     Electronically signed by EVANS Kat CNP on 8/29/2019 at 12:42 PM.

## 2019-09-03 RX ORDER — TIZANIDINE 4 MG/1
TABLET ORAL
Qty: 30 TABLET | Refills: 0 | Status: SHIPPED | OUTPATIENT
Start: 2019-09-03 | End: 2019-10-28 | Stop reason: SDUPTHER

## 2019-09-05 ENCOUNTER — HOSPITAL ENCOUNTER (OUTPATIENT)
Dept: WOUND CARE | Age: 44
Discharge: HOME | End: 2019-09-05
Payer: COMMERCIAL

## 2019-09-05 VITALS
DIASTOLIC BLOOD PRESSURE: 88 MMHG | RESPIRATION RATE: 16 BRPM | HEART RATE: 86 BPM | TEMPERATURE: 98 F | HEIGHT: 72 IN | BODY MASS INDEX: 36.3 KG/M2 | SYSTOLIC BLOOD PRESSURE: 117 MMHG | WEIGHT: 268 LBS

## 2019-09-05 PROCEDURE — 11043 DBRDMT MUSC&/FSCA 1ST 20/<: CPT

## 2019-09-05 RX ORDER — AMMONIUM LACTATE 12 G/100G
CREAM TOPICAL
Qty: 1 TUBE | Refills: 6 | Status: ON HOLD | OUTPATIENT
Start: 2019-09-05 | End: 2022-01-01

## 2019-09-05 RX ORDER — LIDOCAINE 40 MG/G
CREAM TOPICAL ONCE
Status: DISCONTINUED | OUTPATIENT
Start: 2019-09-05 | End: 2019-09-06 | Stop reason: HOSPADM

## 2019-09-05 ASSESSMENT — PAIN SCALES - GENERAL: PAINLEVEL_OUTOF10: 0

## 2019-09-05 NOTE — PROGRESS NOTES
 CHF (congestive heart failure) (HCC)    Cardiomyopathy ([facility]    Mixed hyperlipidemia    Diabetic peripheral neuropathy associated with type 2 diabetes mellitus ([facility]    Neuropathic diabetic ulcer of foot ([facility]    Diabetic ulcer of toe of right foot associated with type 2 diabetes mellitus ([facility]    Osteomyelitis due to type 2 diabetes mellitus ([facility]    Diabetic foot infection ([facility]    Partial nontraumatic amputation of right foot ([facility]     Procedure Note    Performed by: Eli Garcia DPM    Consent obtained: Yes    Time out taken:  Yes    Pain Control: Anesthetic  Anesthetic: 4% Topical Xylocaine     Debridement:Excisional Debridement    Using curette the wound was sharply debrided    down through and including the removal of epidermis, dermis, subcutaneous tissue and muscle/fascia. Devitalized Tissue Debrided:  fibrin, slough, necrotic/eschar and exudate    Pre Debridement Measurements:  Are located in the Wound Documentation Flow Sheet    Wound #: 2     Wound Care Documentation:  Incision 08/15/13 Knee Left (Active)   Number of days: 8338       Wound 08/29/19 #2, Right great toe amp site, Dehisced wound, (onset 8/23/19) (Active)   Wound Other 9/5/2019  9:54 AM   Dressing Status Clean;Dry; Intact 9/5/2019 11:28 AM   Dressing Changed Changed/New 9/5/2019 11:28 AM   Dressing/Treatment Other (comment) 9/5/2019 11:28 AM   Wound Cleansed Wound cleanser 9/5/2019  9:54 AM   Wound Length (cm) 3.7 cm 9/5/2019  9:54 AM   Wound Width (cm) 1.7 cm 9/5/2019  9:54 AM   Wound Depth (cm) 0.1 cm 9/5/2019  9:54 AM   Wound Surface Area (cm^2) 6.29 cm^2 9/5/2019  9:54 AM   Change in Wound Size % (l*w) -365.93 9/5/2019  9:54 AM   Wound Volume (cm^3) 0.63 cm^3 9/5/2019  9:54 AM   Wound Healing % -350 9/5/2019  9:54 AM   Post-Procedure Length (cm) 3.7 cm 9/5/2019 10:08 AM   Post-Procedure Width (cm) 1.2 cm 9/5/2019 10:08 AM   Post-Procedure Depth (cm) 0.5 cm 9/5/2019 10:08 AM   Post-Procedure Surface Area (cm^2) 4.44 cm^2 9/5/2019 10:08 AM   Post-Procedure Volume (cm^3) 2.22 cm^3 9/5/2019 10:08 AM   Wound Assessment Pink;Red 9/5/2019  9:54 AM   Drainage Amount Small 9/5/2019  9:54 AM   Drainage Description Serosanguinous 9/5/2019  9:54 AM   Odor None 9/5/2019  9:54 AM   Shanita-wound Assessment Blanchable erythema 9/5/2019  9:54 AM   Number of days: 7       Wound 09/05/19 #3, left pretib, traumatic, partial thickness, onset 9/4/2019 (Active)   Wound Image   9/5/2019 10:08 AM   Wound Traumatic 9/5/2019  9:54 AM   Dressing Status Clean;Dry; Intact 9/5/2019 11:28 AM   Dressing Changed Changed/New 9/5/2019 11:28 AM   Dressing/Treatment Other (comment) 9/5/2019 11:28 AM   Wound Length (cm) 0.7 cm 9/5/2019  9:54 AM   Wound Width (cm) 0.5 cm 9/5/2019  9:54 AM   Wound Depth (cm) 0.1 cm 9/5/2019  9:54 AM   Wound Surface Area (cm^2) 0.35 cm^2 9/5/2019  9:54 AM   Wound Volume (cm^3) 0.04 cm^3 9/5/2019  9:54 AM   Tunneling Position ___ O'Clock 0 9/5/2019  9:54 AM   Undermining Starts ___ O'Clock 0 9/5/2019  9:54 AM   Undermining Ends___ O'Clock 0 9/5/2019  9:54 AM   Undermining Maxium Distance (cm) 0 9/5/2019  9:54 AM   Wound Assessment Pink;Red 9/5/2019  9:54 AM   Drainage Amount Small 9/5/2019  9:54 AM   Drainage Description Serous 9/5/2019  9:54 AM   Odor None 9/5/2019  9:54 AM   Shanita-wound Assessment Yellow-brown 9/5/2019  9:54 AM   Number of days: 0       Wound 09/05/19 #4, left posterior leg, traumatic, partial thickness, 9/4/2019 (Active)   Wound Image   9/5/2019  9:54 AM   Wound Traumatic 9/5/2019  9:54 AM   Dressing Status Clean;Dry; Intact 9/5/2019 11:28 AM   Dressing Changed Changed/New 9/5/2019 11:28 AM   Dressing/Treatment Other (comment) 9/5/2019 11:28 AM   Wound Cleansed Rinsed/Irrigated with saline 9/5/2019  9:54 AM   Wound Length (cm) 1 cm 9/5/2019  9:54 AM   Wound Width (cm) 0.7 cm 9/5/2019  9:54 AM   Wound Depth (cm) 0.1 cm 9/5/2019  9:54 AM   Wound Surface Area (cm^2) 0.7 cm^2 9/5/2019  9:54 AM   Wound Volume (cm^3) 0.07 cm^3

## 2019-09-06 ENCOUNTER — TELEPHONE (OUTPATIENT)
Dept: INFECTIOUS DISEASES | Age: 44
End: 2019-09-06

## 2019-09-11 ENCOUNTER — OFFICE VISIT (OUTPATIENT)
Dept: INTERNAL MEDICINE CLINIC | Age: 44
End: 2019-09-11

## 2019-09-11 VITALS
HEIGHT: 76 IN | RESPIRATION RATE: 18 BRPM | WEIGHT: 269 LBS | SYSTOLIC BLOOD PRESSURE: 106 MMHG | HEART RATE: 70 BPM | BODY MASS INDEX: 32.76 KG/M2 | DIASTOLIC BLOOD PRESSURE: 75 MMHG

## 2019-09-11 DIAGNOSIS — L08.9 DIABETIC FOOT INFECTION (HCC): ICD-10-CM

## 2019-09-11 DIAGNOSIS — E11.42 DIABETIC PERIPHERAL NEUROPATHY ASSOCIATED WITH TYPE 2 DIABETES MELLITUS (HCC): Primary | ICD-10-CM

## 2019-09-11 DIAGNOSIS — E78.2 MIXED HYPERLIPIDEMIA: ICD-10-CM

## 2019-09-11 DIAGNOSIS — I42.9 CARDIOMYOPATHY, UNSPECIFIED TYPE (HCC): ICD-10-CM

## 2019-09-11 DIAGNOSIS — Z89.431 PARTIAL NONTRAUMATIC AMPUTATION OF RIGHT FOOT (HCC): ICD-10-CM

## 2019-09-11 DIAGNOSIS — E11.42 TYPE 2 DIABETES MELLITUS WITH DIABETIC POLYNEUROPATHY, WITH LONG-TERM CURRENT USE OF INSULIN (HCC): ICD-10-CM

## 2019-09-11 DIAGNOSIS — L97.511 DIABETIC ULCER OF TOE OF RIGHT FOOT ASSOCIATED WITH TYPE 2 DIABETES MELLITUS, LIMITED TO BREAKDOWN OF SKIN (HCC): ICD-10-CM

## 2019-09-11 DIAGNOSIS — I10 ESSENTIAL HYPERTENSION: ICD-10-CM

## 2019-09-11 DIAGNOSIS — E11.628 DIABETIC FOOT INFECTION (HCC): ICD-10-CM

## 2019-09-11 DIAGNOSIS — Z79.4 TYPE 2 DIABETES MELLITUS WITH DIABETIC POLYNEUROPATHY, WITH LONG-TERM CURRENT USE OF INSULIN (HCC): ICD-10-CM

## 2019-09-11 DIAGNOSIS — E11.621 DIABETIC ULCER OF TOE OF RIGHT FOOT ASSOCIATED WITH TYPE 2 DIABETES MELLITUS, LIMITED TO BREAKDOWN OF SKIN (HCC): ICD-10-CM

## 2019-09-11 PROCEDURE — 2022F DILAT RTA XM EVC RTNOPTHY: CPT | Performed by: INTERNAL MEDICINE

## 2019-09-11 PROCEDURE — 3045F PR MOST RECENT HEMOGLOBIN A1C LEVEL 7.0-9.0%: CPT | Performed by: INTERNAL MEDICINE

## 2019-09-11 PROCEDURE — 1111F DSCHRG MED/CURRENT MED MERGE: CPT | Performed by: INTERNAL MEDICINE

## 2019-09-11 PROCEDURE — G8427 DOCREV CUR MEDS BY ELIG CLIN: HCPCS | Performed by: INTERNAL MEDICINE

## 2019-09-11 PROCEDURE — G8417 CALC BMI ABV UP PARAM F/U: HCPCS | Performed by: INTERNAL MEDICINE

## 2019-09-11 PROCEDURE — 1036F TOBACCO NON-USER: CPT | Performed by: INTERNAL MEDICINE

## 2019-09-11 PROCEDURE — 99213 OFFICE O/P EST LOW 20 MIN: CPT | Performed by: INTERNAL MEDICINE

## 2019-09-11 RX ORDER — GABAPENTIN 400 MG/1
800 CAPSULE ORAL 3 TIMES DAILY
Qty: 180 CAPSULE | Refills: 2 | Status: SHIPPED | OUTPATIENT
Start: 2019-09-11 | End: 2019-09-16 | Stop reason: SDUPTHER

## 2019-09-11 ASSESSMENT — PATIENT HEALTH QUESTIONNAIRE - PHQ9
1. LITTLE INTEREST OR PLEASURE IN DOING THINGS: 0
SUM OF ALL RESPONSES TO PHQ9 QUESTIONS 1 & 2: 0
SUM OF ALL RESPONSES TO PHQ QUESTIONS 1-9: 0
2. FEELING DOWN, DEPRESSED OR HOPELESS: 0
SUM OF ALL RESPONSES TO PHQ QUESTIONS 1-9: 0

## 2019-09-11 NOTE — PROGRESS NOTES
Subjective:      Patient ID:    HPI     41 y.o. Male with known h.o DM-2, HTN, cardiomopathy, CHF, neuropathy here for regular fw. DM- 2 IDDM with complications- neuropathy and foot ulcer   Since he developed right great toe ulcer needing amputation last month   He has been very careful about sugars and monitoring it 3 times daily   Fasting still above 200 and post prandial close to 300   Taking lantus 50 units now and humalog appx 12-15 units with each meal  No low sugars  Compliant with diet and meds   Not exercising much with foot issues   Reports neuropathy symptoms getting worse       Has h.o non ischemic cardiomyopathy . EF noted low at 20-25 % (2014)and treated with diuresis , b blockers, digoxin  , improved EF on f.w ECHo  Able to work  Normally with no residual dyspnea or pedal edema. Back to work  No chest pain or nocturnal cough-   Currently on coreg and losartan and lasix  only. Off digoxin. Aldactone             Has severe Neuropathy in extremities with symptoms of tingling and numbness in hands . Palms are hypersensitive. Now on neurontin tid, off tramadol and pain meds. Unable to afford lyrica. Anxiety improved on paxil     HTN - recently office readings remain stable with meds   Being compliant    Chronic lower ext wounds, no infection per pt         Current Outpatient Medications   Medication Sig Dispense Refill    gabapentin (NEURONTIN) 400 MG capsule Take 2 capsules by mouth 3 times daily for 90 days. 180 capsule 2    insulin glargine (TOUJEO SOLOSTAR) 300 UNIT/ML injection pen Inject 65 Units into the skin nightly 5 pen 3    ammonium lactate (LAC-HYDRIN) 12 % cream Apply topically 2x daily.  1 Tube 6    tiZANidine (ZANAFLEX) 4 MG tablet TAKE 1 TABLET BY MOUTH NIGHTLY AS NEEDED FOR MUSCLE SPASMS 30 tablet 0    sildenafil (VIAGRA) 100 MG tablet TAKE 1 TABLET BY MOUTH AS NEEDED FOR ERECTILE DYSFUNCTION 15 tablet 0    insulin lispro (HUMALOG KWIKPEN) 100 UNIT/ML pen Inject 15 Units ([address]  gabapentin (NEURONTIN) 400 MG capsule   2. Diabetic foot infection ([address]     3. Cardiomyopathy, unspecified type ([address]     4. Diabetic ulcer of toe of right foot associated with type 2 diabetes mellitus, limited to breakdown of skin ([address]     5. Mixed hyperlipidemia     6. Essential hypertension     7. Partial nontraumatic amputation of right foot ([address]     8. Type 2 diabetes mellitus with diabetic polyneuropathy, with long-term current use of insulin ([facility])  insulin glargine (TOUJEO SOLOSTAR) 300 UNIT/ML injection pen         Wt Readings from Last 3 Encounters:   09/11/19 269 lb (122 kg)   09/05/19 268 lb (121.6 kg)   08/29/19 266 lb 9.6 oz (120.9 kg)         DM- 2 chronically uncontrolled with symptoms and complications  - peripheral neuropathy, ED , skin ulcers  - s/p recent right great toe amputation     - continue amaryl   - continue toujeo 55 at night, humalog 12-1 5 units tid   - diabetic diet   - continue losartan and statins, off gemfibrozil for side effects   Need eye exam this year      HTN - recently improved with being compliant  -coreg, losartan     Hyperlipidemia - severely elevated TGL.   on statins,- need recheck,    Peripheral neuropathy - from uncontrolled DM  - increase  Gabapentin to 800 mg TID  - off pain mx      ED - prn viagra     Depression - stable on paxil      F/w 3 months

## 2019-09-12 ENCOUNTER — HOSPITAL ENCOUNTER (OUTPATIENT)
Dept: WOUND CARE | Age: 44
Discharge: HOME | End: 2019-09-12
Payer: COMMERCIAL

## 2019-09-12 VITALS
OXYGEN SATURATION: 99 % | TEMPERATURE: 98.2 F | RESPIRATION RATE: 16 BRPM | WEIGHT: 264 LBS | DIASTOLIC BLOOD PRESSURE: 99 MMHG | SYSTOLIC BLOOD PRESSURE: 132 MMHG | HEIGHT: 72 IN | HEART RATE: 95 BPM | BODY MASS INDEX: 35.76 KG/M2

## 2019-09-12 PROCEDURE — 11043 DBRDMT MUSC&/FSCA 1ST 20/<: CPT

## 2019-09-12 RX ORDER — LIDOCAINE 40 MG/G
CREAM TOPICAL ONCE
Status: DISCONTINUED | OUTPATIENT
Start: 2019-09-12 | End: 2019-09-13 | Stop reason: HOSPADM

## 2019-09-12 RX ORDER — GENTAMICIN SULFATE 1 MG/G
CREAM TOPICAL
Qty: 1 TUBE | Refills: 5 | Status: SHIPPED | OUTPATIENT
Start: 2019-09-12 | End: 2020-03-02

## 2019-09-14 DIAGNOSIS — E11.42 TYPE 2 DIABETES MELLITUS WITH DIABETIC POLYNEUROPATHY, WITH LONG-TERM CURRENT USE OF INSULIN (HCC): ICD-10-CM

## 2019-09-14 DIAGNOSIS — Z79.4 TYPE 2 DIABETES MELLITUS WITH DIABETIC POLYNEUROPATHY, WITH LONG-TERM CURRENT USE OF INSULIN (HCC): ICD-10-CM

## 2019-09-14 DIAGNOSIS — F41.9 ANXIETY: ICD-10-CM

## 2019-09-16 ENCOUNTER — TELEPHONE (OUTPATIENT)
Dept: INTERNAL MEDICINE CLINIC | Age: 44
End: 2019-09-16

## 2019-09-16 DIAGNOSIS — E11.42 DIABETIC PERIPHERAL NEUROPATHY ASSOCIATED WITH TYPE 2 DIABETES MELLITUS (HCC): ICD-10-CM

## 2019-09-16 RX ORDER — GLIMEPIRIDE 4 MG/1
4 TABLET ORAL
Qty: 30 TABLET | Refills: 2 | Status: SHIPPED | OUTPATIENT
Start: 2019-09-16 | End: 2020-02-21

## 2019-09-16 RX ORDER — PAROXETINE 10 MG/1
10 TABLET, FILM COATED ORAL EVERY MORNING
Qty: 30 TABLET | Refills: 2 | Status: SHIPPED | OUTPATIENT
Start: 2019-09-16 | End: 2020-02-21

## 2019-09-16 RX ORDER — GABAPENTIN 400 MG/1
800 CAPSULE ORAL 3 TIMES DAILY
Qty: 180 CAPSULE | Refills: 2 | Status: SHIPPED | OUTPATIENT
Start: 2019-09-16 | End: 2019-11-01 | Stop reason: SDUPTHER

## 2019-09-16 NOTE — TELEPHONE ENCOUNTER
[facility] for pt to return call.   ----- Message from Raj Quinn sent at 9/16/2019  2:18 PM EDT -----  Contact: pt      ----- Message -----  From: Rama Reagan MD  Sent: 9/16/2019   2:17 PM EDT  To: Rodríguez Adame    Not sure of data  But several people using for pain     ----- Message -----  From: Marlen Meraz  Sent: 9/16/2019   2:03 PM EDT  To: Rama Reagan MD    Pt called and said his pharmacy was passing out flyers for CBD oil and he wants to know if you recommend it. He would like a call back at [phone]. Last appt. 9-11-19. Next appt. 12-18-19.

## 2019-09-16 NOTE — TELEPHONE ENCOUNTER
----- Message from Charles Ng MD sent at 9/16/2019 11:12 AM EDT -----  Contact: Darshana    ----- Message -----  From: James Burrell MA  Sent: 9/16/2019  10:29 AM EDT  To: Charles Ng MD    Please sign off on patient's gabapentin. Pharmacy did not receive a script.

## 2019-09-19 ENCOUNTER — HOSPITAL ENCOUNTER (OUTPATIENT)
Dept: WOUND CARE | Age: 44
Discharge: HOME | End: 2019-09-19
Payer: COMMERCIAL

## 2019-09-19 VITALS
BODY MASS INDEX: 36.03 KG/M2 | SYSTOLIC BLOOD PRESSURE: 132 MMHG | HEIGHT: 72 IN | HEART RATE: 91 BPM | WEIGHT: 266 LBS | TEMPERATURE: 99.4 F | RESPIRATION RATE: 16 BRPM | DIASTOLIC BLOOD PRESSURE: 89 MMHG

## 2019-09-19 PROCEDURE — 11043 DBRDMT MUSC&/FSCA 1ST 20/<: CPT

## 2019-09-19 ASSESSMENT — PAIN SCALES - GENERAL: PAINLEVEL_OUTOF10: 0

## 2019-09-19 NOTE — PROGRESS NOTES
[address] [address]  Progress Note and Procedure Note      Bobby Osei  AGE: 41 y.o. GENDER: male  : 1975  TODAY'S DATE:  2019    Subjective:     Chief Complaint   Patient presents with    Wound Check         HISTORY of PRESENT ILLNESS HPI     Aldair Chan is a 41 y.o. male who presents today for wound evaluation. History of Wound: This pleasant 51-year-old male was seen in the hospital for diabetic foot infection 2019. He went on to develop septic arthritis and had his hallux amputated last week. He is changing the bandage every day. He states that the blood sugars have been improving with his last blood sugar being 160. He states they are staying under 200.   Wound Pain:  none  Severity:  0 / 10   Wound Type:  diabetic and pressure  Modifying Factors:  edema and poor glucose control  Associated Signs/Symptoms:  edema, erythema, drainage and pain        PAST MEDICAL HISTORY        Diagnosis Date    Arthritis     OA    Clostridium difficile infection 2016    PCR+    Diabetes mellitus ([address]     TYPE 2- NO MEDS SINCE WEIGHT LOSS    Hyperlipidemia     Hypertension     Medial meniscus tear     LEFT    Osteoarthritis     Type II or unspecified type diabetes mellitus without mention of complication, not stated as uncontrolled        PAST SURGICAL HISTORY    Past Surgical History:   Procedure Laterality Date    KNEE ARTHROSCOPY Left [id number]    LEFT KNEE ARTHROSCOPY , SYNOVECTOMY       TOE AMPUTATION Right 2019    PARTIAL RIGHT FOOT AMPUTATION performed by Luma Wright DPM at [address] [facility] EXTRACTION         FAMILY HISTORY    Family History   Problem Relation Age of Onset    Diabetes Mother     High Blood Pressure Mother     High Cholesterol Mother     Diabetes Father     High Blood Pressure Father     High Cholesterol Father     Stroke Father     High Blood Pressure Sister     High Blood Pressure Maternal Uncle sildenafil (VIAGRA) 100 MG tablet TAKE 1 TABLET BY MOUTH AS NEEDED FOR ERECTILE DYSFUNCTION 15 tablet 0    insulin lispro (HUMALOG KWIKPEN) 100 UNIT/ML pen Inject 15 Units into the skin 3 times daily (before meals) (Patient taking differently: Inject into the skin 3 times daily (before meals) Patient takes per sliding scale) 5 pen 0    furosemide (LASIX) 20 MG tablet Take 1 tablet by mouth daily 30 tablet 2    Insulin Syringe-Needle U-100 ([employer] INS SYR .3CC/29GX0.5\") 29G X 1/2\" 0.3 ML MISC 1 each by Does not apply route daily 100 each 3    blood glucose test strips (CONTOUR NEXT TEST) strip USE TO TEST BLOOD SUGAR LEVELS 3 TIMES DAILY 100 strip 11    losartan (COZAAR) 50 MG tablet TAKE 1 TABLET BY MOUTH DAILY 30 tablet 2    carvedilol (COREG) 12.5 MG tablet Take 1 tablet by mouth 2 times daily (with meals) 60 tablet 2    Lancets MISC Test three times a day with Contour Next EZ machine. DX: E11.9 100 each 11    glucose blood VI test strips (EXACTECH TEST) strip 1 each by In Vitro route daily. As needed. 100 each 0     No current facility-administered medications on file prior to encounter. REVIEW OF SYSTEMS    Pertinent items are noted in HPI. Objective:      /89   Pulse 91   Temp 99.4 °F (37.4 °C) (Oral)   Resp 16   Ht 6' (1.829 m)   Wt 266 lb (120.7 kg)   BMI 36.08 kg/m²     PHYSICAL EXAM    Vascular: Vascular status Intact  palpable pedal pulses, right DP2/4 and PT2/4, left DP2/4 and PT2/4. CFT 2 seconds digits 1 to 5 bilateral.  Hair growthAbsent  both lower extremities and feet. Skin temperature is warm to warm from pretibial area to distal digits bilateral.  Exam is negative for rubor, pallor, cyanosis or signs of acute vascular compromise bilaterally. Exam is positive for edema bilateral lower extremity. Varicosities Present bilateral lower extremity.    Neuro: Neurologic status diminished bilateral with epicritic Absent  , proprioceptive Absent , vibratory sensation Absent  and protopathic Absent. DTRs Present bilateral Achilles. There were no reproducible neuritic symptoms on exam bilateral feet/ankles. Derm: Ulceration surgical site with necrosis. Ecchymosis Absent  bilateral feet/foot. Musculoskeletal: No pain with debridement wound. Amputation of right great toe 5/5 muscle strength in/eversion and dorsi/plantarflexion bilateral feet. No gross instability noted. Assessment:     Patient Active Problem List   Diagnosis    Hypertension    Diabetes mellitus ([address]    CHF (congestive heart failure) ([address]    Cardiomyopathy ([address]    Mixed hyperlipidemia    Diabetic peripheral neuropathy associated with type 2 diabetes mellitus ([address]    Neuropathic diabetic ulcer of foot ([address]    Diabetic ulcer of toe of right foot associated with type 2 diabetes mellitus ([address]    Osteomyelitis due to type 2 diabetes mellitus ([address]    Diabetic foot infection ([address]    Partial nontraumatic amputation of right foot ([address]     Procedure Note    Performed by: Norman Willson DPM    Consent obtained: Yes    Time out taken:  Yes    Pain Control: Anesthetic  Anesthetic: 4% Topical Xylocaine     Debridement:Excisional Debridement    Using curette the wound was sharply debrided    down through and including the removal of epidermis, dermis, subcutaneous tissue and muscle/fascia. Devitalized Tissue Debrided:  fibrin, slough, necrotic/eschar and exudate    Pre Debridement Measurements:  Are located in the Wound Documentation Flow Sheet    Wound #: 2     Wound Care Documentation:  Wound 08/29/19 #2, Right great toe amp site, Dehisced wound, (onset 8/23/19) (Active)   Wound Image   9/12/2019 10:00 AM   Wound Other 9/19/2019  9:56 AM   Offloading for Diabetic Foot Ulcers Post op shoe 9/12/2019 11:12 AM   Dressing Status Clean;Dry; Intact 9/12/2019 11:12 AM   Dressing Changed Changed/New 9/12/2019 11:12 AM   Dressing/Treatment ABD; Ace Wrap;Collagen 9/12/2019 11:12 AM   Wound

## 2019-09-19 NOTE — PROGRESS NOTES
Per patient, he is using Lachydrin on bilateral lower extremities twice daily. His bottle of lachydrin has been depleted and per pharmacy, it is \"too early\" to reorder. New Rx called into patient pharmacy for 60 grams, 1 gram each leg, twice daily - 6 refills - per Dr. Robert Castorena. Harry Stewart, pharmacist, states this should be enough to supply patient for 1 month.  Anne Benson

## 2019-09-19 NOTE — PLAN OF CARE
Wound debridement per Dr. Doreen Alanis, wound is stable, continue Gentamycin and Cutimed Epiona to Right great toe site, patient continues to decrease blood sugar, continues to wear surgical shoe right foot, non-smoker, f/u x 1 week, discussed importance of elevation RLE frequently throughout day, MD orders/D/C instructions reviewed with patient, all questions answered; copy of instructions given to patient

## 2019-09-26 ENCOUNTER — HOSPITAL ENCOUNTER (OUTPATIENT)
Dept: WOUND CARE | Age: 44
Discharge: HOME | End: 2019-09-26
Payer: COMMERCIAL

## 2019-09-26 VITALS
HEART RATE: 98 BPM | SYSTOLIC BLOOD PRESSURE: 142 MMHG | RESPIRATION RATE: 16 BRPM | WEIGHT: 266 LBS | TEMPERATURE: 97.9 F | HEIGHT: 72 IN | BODY MASS INDEX: 36.03 KG/M2 | DIASTOLIC BLOOD PRESSURE: 99 MMHG

## 2019-09-26 PROCEDURE — 11043 DBRDMT MUSC&/FSCA 1ST 20/<: CPT

## 2019-09-26 PROCEDURE — 11042 DBRDMT SUBQ TIS 1ST 20SQCM/<: CPT

## 2019-09-26 RX ORDER — LIDOCAINE 40 MG/G
CREAM TOPICAL ONCE
Status: DISCONTINUED | OUTPATIENT
Start: 2019-09-26 | End: 2019-09-27 | Stop reason: HOSPADM

## 2019-09-26 RX ORDER — AMMONIUM LACTATE 12 G/100G
CREAM TOPICAL
Qty: 1 TUBE | Refills: 6 | Status: SHIPPED | OUTPATIENT
Start: 2019-09-26 | End: 2020-03-02 | Stop reason: ALTCHOICE

## 2019-09-26 ASSESSMENT — PAIN SCALES - GENERAL: PAINLEVEL_OUTOF10: 0

## 2019-09-26 ASSESSMENT — PAIN DESCRIPTION - PROGRESSION: CLINICAL_PROGRESSION: NOT CHANGED

## 2019-09-26 NOTE — PROGRESS NOTES
[address] [address]  Progress Note and Procedure Note      Kimber Osei  AGE: 41 y.o. GENDER: male  : 1975  TODAY'S DATE:  2019    Subjective:     Chief Complaint   Patient presents with    Wound Check         HISTORY of PRESENT ILLNESS HPI     Oliverio Luong is a 41 y.o. male who presents today for wound evaluation. History of Wound: This pleasant 72-year-old male was seen in the hospital for diabetic foot infection 2019. He went on to develop septic arthritis and had his hallux amputated. He states his blood sugars have been improved and running less than 200. He is still working on his diabetic diet.   Wound Pain:  none  Severity:  0 / 10   Wound Type:  diabetic and pressure  Modifying Factors:  edema and poor glucose control  Associated Signs/Symptoms:  edema, erythema, drainage and pain        PAST MEDICAL HISTORY        Diagnosis Date    Arthritis     OA    Clostridium difficile infection 2016    PCR+    Diabetes mellitus ([facility] [address]     TYPE 2- NO MEDS SINCE WEIGHT LOSS    Hyperlipidemia     Hypertension     Medial meniscus tear     LEFT    Osteoarthritis     Type II or unspecified type diabetes mellitus without mention of complication, not stated as uncontrolled        PAST SURGICAL HISTORY    Past Surgical History:   Procedure Laterality Date    KNEE ARTHROSCOPY Left [id number]    LEFT KNEE ARTHROSCOPY , SYNOVECTOMY       TOE AMPUTATION Right 2019    PARTIAL RIGHT FOOT AMPUTATION performed by Zuleyka Taylor DPM at [address] EXTRACTION         FAMILY HISTORY    Family History   Problem Relation Age of Onset    Diabetes Mother     High Blood Pressure Mother     High Cholesterol Mother     Diabetes Father     High Blood Pressure Father     High Cholesterol Father     Stroke Father     High Blood Pressure Sister     High Blood Pressure Maternal Uncle     High Cholesterol Maternal Uncle     High Blood Pressure Maternal Grandmother        SOCIAL HISTORY    Social History     Tobacco Use    Smoking status: Former Smoker     Packs/day: 0.50     Years: 2.00     Pack years: 1.00     Last attempt to quit: 2005     Years since quittin.1    Smokeless tobacco: Former User     Quit date: 2005    Tobacco comment: SMOKED OCCASIONALLY UNTIL 8 YEARS AGO   Substance Use Topics    Alcohol use: Yes     Comment: RARELY    Drug use: No       ALLERGIES    Allergies   Allergen Reactions    Januvia [Sitagliptin] Nausea Only     Has taken metformin without side effects in the past.  Nausea with Janumet in the past.     Metformin And Related      GI Upset    Vancomycin      Pt had red face, swelling itching of eyelids, sore throat after receiving vancmomyin and cefepime. I think this was a histamine releasing reaction from vancomycin most likely. The cefepime was switched to Zosyn and patient had no reaction to Zosyn    Mustard [employer] Isothiocyanate] Swelling and Rash       MEDICATIONS    Current Outpatient Medications on File Prior to Encounter   Medication Sig Dispense Refill    glimepiride (AMARYL) 4 MG tablet TAKE 1 TABLET BY MOUTH EVERY MORNING (BEFORE BREAKFAST) 30 tablet 2    PARoxetine (PAXIL) 10 MG tablet TAKE 1 TABLET BY MOUTH EVERY MORNING 30 tablet 2    gabapentin (NEURONTIN) 400 MG capsule Take 2 capsules by mouth 3 times daily for 90 days. 180 capsule 2    SIMVASTATIN PO Take 20 mg by mouth nightly       gentamicin (GARAMYCIN) 0.1 % cream Apply topically daily. 1 Tube 5    insulin glargine (TOUJEO SOLOSTAR) 300 UNIT/ML injection pen Inject 65 Units into the skin nightly (Patient taking differently: Inject 60 Units into the skin nightly ) 5 pen 3    ammonium lactate (LAC-HYDRIN) 12 % cream Apply topically 2x daily.  1 Tube 6    tiZANidine (ZANAFLEX) 4 MG tablet TAKE 1 TABLET BY MOUTH NIGHTLY AS NEEDED FOR MUSCLE SPASMS 30 tablet 0    sildenafil (VIAGRA) 100 MG tablet TAKE 1 TABLET BY MOUTH AS

## 2019-10-02 RX ORDER — LOSARTAN POTASSIUM 50 MG/1
50 TABLET ORAL DAILY
Qty: 30 TABLET | Refills: 2 | Status: SHIPPED | OUTPATIENT
Start: 2019-10-02 | End: 2020-01-13

## 2019-10-02 RX ORDER — CARVEDILOL 12.5 MG/1
12.5 TABLET ORAL 2 TIMES DAILY WITH MEALS
Qty: 60 TABLET | Refills: 2 | Status: SHIPPED | OUTPATIENT
Start: 2019-10-02 | End: 2020-02-21

## 2019-10-17 ENCOUNTER — HOSPITAL ENCOUNTER (OUTPATIENT)
Dept: WOUND CARE | Age: 44
Discharge: HOME | End: 2019-10-17
Payer: COMMERCIAL

## 2019-10-17 VITALS
HEART RATE: 98 BPM | RESPIRATION RATE: 16 BRPM | SYSTOLIC BLOOD PRESSURE: 152 MMHG | BODY MASS INDEX: 37.17 KG/M2 | DIASTOLIC BLOOD PRESSURE: 97 MMHG | WEIGHT: 274.4 LBS | TEMPERATURE: 98.3 F | HEIGHT: 72 IN

## 2019-10-17 PROCEDURE — 11042 DBRDMT SUBQ TIS 1ST 20SQCM/<: CPT

## 2019-10-17 RX ORDER — LIDOCAINE 40 MG/G
CREAM TOPICAL ONCE
Status: DISCONTINUED | OUTPATIENT
Start: 2019-10-17 | End: 2019-10-18 | Stop reason: HOSPADM

## 2019-10-17 ASSESSMENT — PAIN SCALES - GENERAL: PAINLEVEL_OUTOF10: 0

## 2019-10-28 ENCOUNTER — TELEPHONE (OUTPATIENT)
Dept: INTERNAL MEDICINE CLINIC | Age: 44
End: 2019-10-28

## 2019-10-28 RX ORDER — TIZANIDINE 4 MG/1
TABLET ORAL
Qty: 30 TABLET | Refills: 2 | Status: SHIPPED | OUTPATIENT
Start: 2019-10-28 | End: 2019-12-23

## 2019-10-28 RX ORDER — AZITHROMYCIN 250 MG/1
TABLET, FILM COATED ORAL
Qty: 1 PACKET | Refills: 0 | Status: SHIPPED | OUTPATIENT
Start: 2019-10-28 | End: 2020-03-02 | Stop reason: ALTCHOICE

## 2019-11-01 DIAGNOSIS — E11.42 DIABETIC PERIPHERAL NEUROPATHY ASSOCIATED WITH TYPE 2 DIABETES MELLITUS (HCC): ICD-10-CM

## 2019-11-01 DIAGNOSIS — E11.42 TYPE 2 DIABETES MELLITUS WITH DIABETIC POLYNEUROPATHY, WITH LONG-TERM CURRENT USE OF INSULIN (HCC): ICD-10-CM

## 2019-11-01 DIAGNOSIS — Z79.4 TYPE 2 DIABETES MELLITUS WITH DIABETIC POLYNEUROPATHY, WITH LONG-TERM CURRENT USE OF INSULIN (HCC): ICD-10-CM

## 2019-11-01 RX ORDER — GABAPENTIN 400 MG/1
800 CAPSULE ORAL 3 TIMES DAILY
Qty: 180 CAPSULE | Refills: 0 | Status: SHIPPED | OUTPATIENT
Start: 2019-11-09 | End: 2019-12-03 | Stop reason: SDUPTHER

## 2019-11-01 RX ORDER — SIMVASTATIN 20 MG
TABLET ORAL
Qty: 30 TABLET | Refills: 1 | Status: SHIPPED | OUTPATIENT
Start: 2019-11-01 | End: 2020-03-02 | Stop reason: SDUPTHER

## 2019-12-03 DIAGNOSIS — E11.42 DIABETIC PERIPHERAL NEUROPATHY ASSOCIATED WITH TYPE 2 DIABETES MELLITUS (HCC): ICD-10-CM

## 2019-12-03 RX ORDER — GABAPENTIN 400 MG/1
800 CAPSULE ORAL 3 TIMES DAILY
Qty: 180 CAPSULE | Refills: 0 | Status: SHIPPED | OUTPATIENT
Start: 2019-12-07 | End: 2020-01-03

## 2019-12-23 RX ORDER — TIZANIDINE 4 MG/1
TABLET ORAL
Qty: 30 TABLET | Refills: 2 | Status: SHIPPED | OUTPATIENT
Start: 2019-12-23 | End: 2020-03-19

## 2020-01-03 RX ORDER — GABAPENTIN 400 MG/1
CAPSULE ORAL
Qty: 180 CAPSULE | Refills: 0 | Status: SHIPPED | OUTPATIENT
Start: 2020-01-06 | End: 2020-02-05 | Stop reason: SDUPTHER

## 2020-01-13 RX ORDER — LOSARTAN POTASSIUM 50 MG/1
50 TABLET ORAL DAILY
Qty: 30 TABLET | Refills: 0 | Status: SHIPPED | OUTPATIENT
Start: 2020-01-13 | End: 2020-03-02 | Stop reason: SDUPTHER

## 2020-02-05 NOTE — TELEPHONE ENCOUNTER
----- Message from Juice Sin sent at 2/5/2020 10:09 AM EST -----  Contact: [phone]  Spouse called because pt needs refill on gabapentin (NEURONTIN) 400 MG capsule-pharmacy he normally uses is experiencing issues so he would like to have it sent to a different location.     [facility] [phone]    [phone]    Past appt- 9/11/2019  Future appt-2/10/2020

## 2020-02-05 NOTE — TELEPHONE ENCOUNTER
----- Message from Susan Sin sent at 2/5/2020 10:09 AM EST -----  Contact: [phone]  Spouse called because pt needs refill on gabapentin (NEURONTIN) 400 MG capsule-pharmacy he normally uses is experiencing issues so he would like to have it sent to a different location.     [facility] [phone]    [phone]    Past appt- 9/11/2019  Future appt-2/10/2020

## 2020-02-06 RX ORDER — GABAPENTIN 400 MG/1
CAPSULE ORAL
Qty: 180 CAPSULE | Refills: 0 | Status: SHIPPED | OUTPATIENT
Start: 2020-02-06 | End: 2020-03-03

## 2020-02-21 RX ORDER — GLIMEPIRIDE 4 MG/1
4 TABLET ORAL
Qty: 30 TABLET | Refills: 0 | Status: ON HOLD | OUTPATIENT
Start: 2020-02-21 | End: 2020-07-28 | Stop reason: SDUPTHER

## 2020-02-21 RX ORDER — CARVEDILOL 12.5 MG/1
12.5 TABLET ORAL 2 TIMES DAILY WITH MEALS
Qty: 60 TABLET | Refills: 0 | Status: SHIPPED | OUTPATIENT
Start: 2020-02-21 | End: 2020-05-27

## 2020-02-21 RX ORDER — PAROXETINE 10 MG/1
10 TABLET, FILM COATED ORAL EVERY MORNING
Qty: 30 TABLET | Refills: 0 | Status: ON HOLD | OUTPATIENT
Start: 2020-02-21 | End: 2020-07-28 | Stop reason: SDUPTHER

## 2020-02-21 RX ORDER — FUROSEMIDE 20 MG/1
20 TABLET ORAL DAILY
Qty: 30 TABLET | Refills: 0 | Status: SHIPPED | OUTPATIENT
Start: 2020-02-21 | End: 2020-03-02 | Stop reason: SDUPTHER

## 2020-03-02 ENCOUNTER — OFFICE VISIT (OUTPATIENT)
Dept: INTERNAL MEDICINE CLINIC | Age: 45
End: 2020-03-02

## 2020-03-02 VITALS
DIASTOLIC BLOOD PRESSURE: 90 MMHG | SYSTOLIC BLOOD PRESSURE: 130 MMHG | BODY MASS INDEX: 33.86 KG/M2 | RESPIRATION RATE: 18 BRPM | WEIGHT: 250 LBS | HEIGHT: 72 IN | HEART RATE: 70 BPM

## 2020-03-02 PROBLEM — Z91.14 NON COMPLIANCE W MEDICATION REGIMEN: Status: ACTIVE | Noted: 2020-03-02

## 2020-03-02 PROBLEM — Z91.148 NON COMPLIANCE W MEDICATION REGIMEN: Status: ACTIVE | Noted: 2020-03-02

## 2020-03-02 LAB
A/G RATIO: 1.3 (ref 1.1–2.2)
ALBUMIN SERPL-MCNC: 4.2 G/DL (ref 3.4–5)
ALP BLD-CCNC: 209 U/L (ref 40–129)
ALT SERPL-CCNC: 25 U/L (ref 10–40)
ANION GAP SERPL CALCULATED.3IONS-SCNC: 18 MMOL/L (ref 3–16)
AST SERPL-CCNC: 21 U/L (ref 15–37)
BILIRUB SERPL-MCNC: 1.8 MG/DL (ref 0–1)
BUN BLDV-MCNC: 13 MG/DL (ref 7–20)
CALCIUM SERPL-MCNC: 9.6 MG/DL (ref 8.3–10.6)
CHLORIDE BLD-SCNC: 94 MMOL/L (ref 99–110)
CHOLESTEROL, FASTING: 235 MG/DL (ref 0–199)
CO2: 21 MMOL/L (ref 21–32)
CREAT SERPL-MCNC: 0.6 MG/DL (ref 0.9–1.3)
GFR AFRICAN AMERICAN: >60
GFR NON-AFRICAN AMERICAN: >60
GLOBULIN: 3.2 G/DL
GLUCOSE BLD-MCNC: 432 MG/DL (ref 70–99)
HDLC SERPL-MCNC: 25 MG/DL (ref 40–60)
LDL CHOLESTEROL CALCULATED: ABNORMAL MG/DL
LDL CHOLESTEROL DIRECT: 34 MG/DL
POTASSIUM SERPL-SCNC: 4.4 MMOL/L (ref 3.5–5.1)
SODIUM BLD-SCNC: 133 MMOL/L (ref 136–145)
TOTAL PROTEIN: 7.4 G/DL (ref 6.4–8.2)
TRIGLYCERIDE, FASTING: 1192 MG/DL (ref 0–150)
VLDLC SERPL CALC-MCNC: ABNORMAL MG/DL

## 2020-03-02 PROCEDURE — G8484 FLU IMMUNIZE NO ADMIN: HCPCS | Performed by: INTERNAL MEDICINE

## 2020-03-02 PROCEDURE — G8417 CALC BMI ABV UP PARAM F/U: HCPCS | Performed by: INTERNAL MEDICINE

## 2020-03-02 PROCEDURE — 1036F TOBACCO NON-USER: CPT | Performed by: INTERNAL MEDICINE

## 2020-03-02 PROCEDURE — G8427 DOCREV CUR MEDS BY ELIG CLIN: HCPCS | Performed by: INTERNAL MEDICINE

## 2020-03-02 PROCEDURE — 2022F DILAT RTA XM EVC RTNOPTHY: CPT | Performed by: INTERNAL MEDICINE

## 2020-03-02 PROCEDURE — 3046F HEMOGLOBIN A1C LEVEL >9.0%: CPT | Performed by: INTERNAL MEDICINE

## 2020-03-02 PROCEDURE — 99214 OFFICE O/P EST MOD 30 MIN: CPT | Performed by: INTERNAL MEDICINE

## 2020-03-02 RX ORDER — LOSARTAN POTASSIUM 50 MG/1
50 TABLET ORAL DAILY
Qty: 30 TABLET | Refills: 3 | Status: SHIPPED | OUTPATIENT
Start: 2020-03-02 | End: 2020-07-02

## 2020-03-02 RX ORDER — TRAZODONE HYDROCHLORIDE 50 MG/1
50 TABLET ORAL NIGHTLY
Qty: 30 TABLET | Refills: 0 | Status: SHIPPED | OUTPATIENT
Start: 2020-03-02 | End: 2020-03-30

## 2020-03-02 RX ORDER — SIMVASTATIN 20 MG
20 TABLET ORAL NIGHTLY
Qty: 30 TABLET | Refills: 3 | Status: SHIPPED | OUTPATIENT
Start: 2020-03-02 | End: 2020-07-02

## 2020-03-02 RX ORDER — FUROSEMIDE 20 MG/1
20 TABLET ORAL DAILY
Qty: 30 TABLET | Refills: 3 | Status: ON HOLD | OUTPATIENT
Start: 2020-03-02 | End: 2020-07-28 | Stop reason: SDUPTHER

## 2020-03-02 RX ORDER — LEVOFLOXACIN 500 MG/1
500 TABLET, FILM COATED ORAL DAILY
Qty: 5 TABLET | Refills: 0 | Status: SHIPPED | OUTPATIENT
Start: 2020-03-02 | End: 2020-03-07

## 2020-03-02 NOTE — PROGRESS NOTES
03/02/20 250 lb (113.4 kg)   10/17/19 274 lb 6.4 oz (124.5 kg)   09/26/19 266 lb (120.7 kg)           General:  Young male, Awake, alert and oriented. Appears to be not in any distress  Mucous Membranes:  Pink , anicteric  Neck: No JVD, no carotid bruit, no thyromegaly  Chest:  Clear to auscultation bilaterally, no added sounds  Cardiovascular:  RRR S1S2 heard, no murmurs or gallops tachycardic  Abdomen:  Soft, undistended, non tender, no organomegaly, BS present  Extremities: right palm with healing crusted ulcer at base of palm with surrounding erythema   Index finger with crusted wound  Trace edema  Distal pulses well felt  Neurological : grossly normal        ASSESMENT & PLAN:        Diagnosis Orders   1. Diabetes mellitus type 2, uncontrolled, with complications (HCC)  HEMOGLOBIN A1C    COMPREHENSIVE METABOLIC PANEL    Lipid, Fasting   2. Partial nontraumatic amputation of right foot ([facility] [address]     3. Mixed hyperlipidemia     4. Neuropathic diabetic ulcer of foot ([facility] [address]     5. Essential hypertension     6. Diabetic peripheral neuropathy associated with type 2 diabetes mellitus ([address]     7. Non compliance w medication regimen     8. Type 2 diabetes mellitus with diabetic polyneuropathy, with long-term current use of insulin (HCC)  simvastatin (ZOCOR) 20 MG tablet   9. Chronic systolic congestive heart failure (HCC)  furosemide (LASIX) 20 MG tablet         DM- 2 chronically uncontrolled with symptoms and complications  - peripheral neuropathy, ED , skin ulcers  - s/p recent right great toe amputation     - continue amaryl   - non compliant with diet and insulin for last few months   - resume  toujeo 55 at night, humalog 12-15 units tid   - diabetic diet   - continue losartan and statins, off gemfibrozil for side effects   Need eye exam this year      HTN - recently improved with being compliant  -coreg, losartan     Hyperlipidemia - severely elevated TGL.   on statins,- need recheck    Cardiomyopathy - with low EF 2014, thought to be viral - resolved 2015 with improved EF     Peripheral neuropathy - from uncontrolled DM  - continue neurontin   - off pain mx    Right palmar wound - healing with minimal cellulitis  Daily wound care, add levaquin x 5 days      ED - prn viagra     Depression - stable on paxil      F/w 3 months

## 2020-03-03 LAB
ESTIMATED AVERAGE GLUCOSE: 286.2 MG/DL
HBA1C MFR BLD: 11.6 %

## 2020-03-03 RX ORDER — GABAPENTIN 400 MG/1
CAPSULE ORAL
Qty: 180 CAPSULE | Refills: 0 | Status: SHIPPED | OUTPATIENT
Start: 2020-03-07 | End: 2020-04-01

## 2020-03-19 RX ORDER — TIZANIDINE 4 MG/1
TABLET ORAL
Qty: 30 TABLET | Refills: 2 | Status: SHIPPED | OUTPATIENT
Start: 2020-03-19 | End: 2020-06-12

## 2020-03-30 RX ORDER — TRAZODONE HYDROCHLORIDE 50 MG/1
50 TABLET ORAL NIGHTLY
Qty: 30 TABLET | Refills: 0 | Status: SHIPPED | OUTPATIENT
Start: 2020-03-30 | End: 2020-04-27

## 2020-04-01 RX ORDER — GABAPENTIN 400 MG/1
CAPSULE ORAL
Qty: 180 CAPSULE | Refills: 0 | Status: SHIPPED | OUTPATIENT
Start: 2020-04-05 | End: 2020-05-26

## 2020-04-28 RX ORDER — TRAZODONE HYDROCHLORIDE 50 MG/1
50 TABLET ORAL NIGHTLY
Qty: 30 TABLET | Refills: 1 | Status: SHIPPED | OUTPATIENT
Start: 2020-04-28 | End: 2020-05-27

## 2020-05-27 RX ORDER — GABAPENTIN 400 MG/1
CAPSULE ORAL
Qty: 180 CAPSULE | Refills: 0 | Status: SHIPPED | OUTPATIENT
Start: 2020-05-27 | End: 2020-06-24

## 2020-05-27 RX ORDER — CARVEDILOL 12.5 MG/1
12.5 TABLET ORAL 2 TIMES DAILY WITH MEALS
Qty: 60 TABLET | Refills: 0 | Status: ON HOLD | OUTPATIENT
Start: 2020-05-27 | End: 2020-07-28 | Stop reason: SDUPTHER

## 2020-05-27 RX ORDER — TRAZODONE HYDROCHLORIDE 50 MG/1
50 TABLET ORAL NIGHTLY
Qty: 30 TABLET | Refills: 1 | Status: SHIPPED | OUTPATIENT
Start: 2020-05-27 | End: 2020-08-21 | Stop reason: SDUPTHER

## 2020-06-12 RX ORDER — TIZANIDINE 4 MG/1
TABLET ORAL
Qty: 30 TABLET | Refills: 0 | Status: SHIPPED | OUTPATIENT
Start: 2020-06-12 | End: 2020-08-06

## 2020-06-24 RX ORDER — GABAPENTIN 400 MG/1
CAPSULE ORAL
Qty: 180 CAPSULE | Refills: 0 | Status: SHIPPED | OUTPATIENT
Start: 2020-06-26 | End: 2020-07-20

## 2020-07-02 RX ORDER — LOSARTAN POTASSIUM 50 MG/1
50 TABLET ORAL DAILY
Qty: 30 TABLET | Refills: 3 | Status: SHIPPED | OUTPATIENT
Start: 2020-07-02 | End: 2020-11-02

## 2020-07-02 RX ORDER — SIMVASTATIN 20 MG
20 TABLET ORAL NIGHTLY
Qty: 30 TABLET | Refills: 3 | Status: SHIPPED | OUTPATIENT
Start: 2020-07-02 | End: 2021-03-11 | Stop reason: ALTCHOICE

## 2020-07-20 RX ORDER — GABAPENTIN 400 MG/1
CAPSULE ORAL
Qty: 180 CAPSULE | Refills: 0 | Status: SHIPPED | OUTPATIENT
Start: 2020-07-24 | End: 2020-08-17

## 2020-07-24 ENCOUNTER — HOSPITAL ENCOUNTER (INPATIENT)
Age: 45
LOS: 4 days | Discharge: HOME | DRG: 853 | End: 2020-07-28
Attending: EMERGENCY MEDICINE | Admitting: INTERNAL MEDICINE
Payer: COMMERCIAL

## 2020-07-24 ENCOUNTER — ANESTHESIA (OUTPATIENT)
Dept: OPERATING ROOM | Age: 45
DRG: 853 | End: 2020-07-24
Payer: COMMERCIAL

## 2020-07-24 ENCOUNTER — ANESTHESIA EVENT (OUTPATIENT)
Dept: OPERATING ROOM | Age: 45
DRG: 853 | End: 2020-07-24
Payer: COMMERCIAL

## 2020-07-24 ENCOUNTER — APPOINTMENT (OUTPATIENT)
Dept: GENERAL RADIOLOGY | Age: 45
DRG: 853 | End: 2020-07-24
Payer: COMMERCIAL

## 2020-07-24 VITALS
OXYGEN SATURATION: 97 % | SYSTOLIC BLOOD PRESSURE: 126 MMHG | RESPIRATION RATE: 28 BRPM | DIASTOLIC BLOOD PRESSURE: 78 MMHG

## 2020-07-24 PROBLEM — M86.9 TOE OSTEOMYELITIS, LEFT (HCC): Status: ACTIVE | Noted: 2020-07-24

## 2020-07-24 LAB
A/G RATIO: 1 (ref 1.1–2.2)
ALBUMIN SERPL-MCNC: 3.6 G/DL (ref 3.4–5)
ALP BLD-CCNC: 281 U/L (ref 40–129)
ALT SERPL-CCNC: 22 U/L (ref 10–40)
ANION GAP SERPL CALCULATED.3IONS-SCNC: 16 MMOL/L (ref 3–16)
AST SERPL-CCNC: 28 U/L (ref 15–37)
BASOPHILS ABSOLUTE: 0.1 K/UL (ref 0–0.2)
BASOPHILS RELATIVE PERCENT: 0.4 %
BILIRUB SERPL-MCNC: 1.2 MG/DL (ref 0–1)
BUN BLDV-MCNC: 17 MG/DL (ref 7–20)
CALCIUM SERPL-MCNC: 8.9 MG/DL (ref 8.3–10.6)
CHLORIDE BLD-SCNC: 90 MMOL/L (ref 99–110)
CO2: 25 MMOL/L (ref 21–32)
CREAT SERPL-MCNC: 0.9 MG/DL (ref 0.9–1.3)
EOSINOPHILS ABSOLUTE: 0 K/UL (ref 0–0.6)
EOSINOPHILS RELATIVE PERCENT: 0.1 %
GFR AFRICAN AMERICAN: >60
GFR NON-AFRICAN AMERICAN: >60
GLOBULIN: 3.5 G/DL
GLUCOSE BLD-MCNC: 241 MG/DL (ref 70–99)
GLUCOSE BLD-MCNC: 283 MG/DL (ref 70–99)
GLUCOSE BLD-MCNC: 512 MG/DL (ref 70–99)
HCT VFR BLD CALC: 40.1 % (ref 40.5–52.5)
HEMOGLOBIN: 13.8 G/DL (ref 13.5–17.5)
LACTIC ACID, SEPSIS: 0.7 MMOL/L (ref 0.4–1.9)
LACTIC ACID, SEPSIS: 2.2 MMOL/L (ref 0.4–1.9)
LYMPHOCYTES ABSOLUTE: 0.8 K/UL (ref 1–5.1)
LYMPHOCYTES RELATIVE PERCENT: 6.4 %
MCH RBC QN AUTO: 31.9 PG (ref 26–34)
MCHC RBC AUTO-ENTMCNC: 34.4 G/DL (ref 31–36)
MCV RBC AUTO: 92.8 FL (ref 80–100)
MONOCYTES ABSOLUTE: 1.3 K/UL (ref 0–1.3)
MONOCYTES RELATIVE PERCENT: 9.8 %
NEUTROPHILS ABSOLUTE: 10.8 K/UL (ref 1.7–7.7)
NEUTROPHILS RELATIVE PERCENT: 83.3 %
PDW BLD-RTO: 12.2 % (ref 12.4–15.4)
PERFORMED ON: ABNORMAL
PERFORMED ON: ABNORMAL
PLATELET # BLD: 252 K/UL (ref 135–450)
PMV BLD AUTO: 7.5 FL (ref 5–10.5)
POTASSIUM REFLEX MAGNESIUM: 4.3 MMOL/L (ref 3.5–5.1)
RBC # BLD: 4.32 M/UL (ref 4.2–5.9)
SODIUM BLD-SCNC: 131 MMOL/L (ref 136–145)
TOTAL PROTEIN: 7.1 G/DL (ref 6.4–8.2)
WBC # BLD: 13 K/UL (ref 4–11)

## 2020-07-24 PROCEDURE — 36415 COLL VENOUS BLD VENIPUNCTURE: CPT

## 2020-07-24 PROCEDURE — 3700000001 HC ADD 15 MINUTES (ANESTHESIA): Performed by: PODIATRIST

## 2020-07-24 PROCEDURE — 2709999900 HC NON-CHARGEABLE SUPPLY: Performed by: PODIATRIST

## 2020-07-24 PROCEDURE — 83036 HEMOGLOBIN GLYCOSYLATED A1C: CPT

## 2020-07-24 PROCEDURE — 2580000003 HC RX 258: Performed by: PODIATRIST

## 2020-07-24 PROCEDURE — 1200000000 HC SEMI PRIVATE

## 2020-07-24 PROCEDURE — 73660 X-RAY EXAM OF TOE(S): CPT

## 2020-07-24 PROCEDURE — 6360000002 HC RX W HCPCS: Performed by: PHYSICIAN ASSISTANT

## 2020-07-24 PROCEDURE — 99284 EMERGENCY DEPT VISIT MOD MDM: CPT

## 2020-07-24 PROCEDURE — 2060000000 HC ICU INTERMEDIATE R&B

## 2020-07-24 PROCEDURE — 99254 IP/OBS CNSLTJ NEW/EST MOD 60: CPT | Performed by: INTERNAL MEDICINE

## 2020-07-24 PROCEDURE — 87076 CULTURE ANAEROBE IDENT EACH: CPT

## 2020-07-24 PROCEDURE — 6360000002 HC RX W HCPCS: Performed by: PODIATRIST

## 2020-07-24 PROCEDURE — 80053 COMPREHEN METABOLIC PANEL: CPT

## 2020-07-24 PROCEDURE — 6360000002 HC RX W HCPCS: Performed by: ANESTHESIOLOGY

## 2020-07-24 PROCEDURE — 3600000013 HC SURGERY LEVEL 3 ADDTL 15MIN: Performed by: PODIATRIST

## 2020-07-24 PROCEDURE — 0Y6N0Z9 DETACHMENT AT LEFT FOOT, PARTIAL 1ST RAY, OPEN APPROACH: ICD-10-PCS | Performed by: PODIATRIST

## 2020-07-24 PROCEDURE — 96367 TX/PROPH/DG ADDL SEQ IV INF: CPT

## 2020-07-24 PROCEDURE — 83605 ASSAY OF LACTIC ACID: CPT

## 2020-07-24 PROCEDURE — 87077 CULTURE AEROBIC IDENTIFY: CPT

## 2020-07-24 PROCEDURE — 88311 DECALCIFY TISSUE: CPT

## 2020-07-24 PROCEDURE — 85025 COMPLETE CBC W/AUTO DIFF WBC: CPT

## 2020-07-24 PROCEDURE — 2580000003 HC RX 258: Performed by: NURSE ANESTHETIST, CERTIFIED REGISTERED

## 2020-07-24 PROCEDURE — 6360000002 HC RX W HCPCS: Performed by: NURSE ANESTHETIST, CERTIFIED REGISTERED

## 2020-07-24 PROCEDURE — 2500000003 HC RX 250 WO HCPCS: Performed by: PODIATRIST

## 2020-07-24 PROCEDURE — 88305 TISSUE EXAM BY PATHOLOGIST: CPT

## 2020-07-24 PROCEDURE — 3700000000 HC ANESTHESIA ATTENDED CARE: Performed by: PODIATRIST

## 2020-07-24 PROCEDURE — 2500000003 HC RX 250 WO HCPCS: Performed by: NURSE ANESTHETIST, CERTIFIED REGISTERED

## 2020-07-24 PROCEDURE — 7100000000 HC PACU RECOVERY - FIRST 15 MIN: Performed by: PODIATRIST

## 2020-07-24 PROCEDURE — 7100000001 HC PACU RECOVERY - ADDTL 15 MIN: Performed by: PODIATRIST

## 2020-07-24 PROCEDURE — 3600000003 HC SURGERY LEVEL 3 BASE: Performed by: PODIATRIST

## 2020-07-24 PROCEDURE — 87205 SMEAR GRAM STAIN: CPT

## 2020-07-24 PROCEDURE — 87040 BLOOD CULTURE FOR BACTERIA: CPT

## 2020-07-24 PROCEDURE — 2580000003 HC RX 258: Performed by: PHYSICIAN ASSISTANT

## 2020-07-24 PROCEDURE — 87075 CULTR BACTERIA EXCEPT BLOOD: CPT

## 2020-07-24 PROCEDURE — 6370000000 HC RX 637 (ALT 250 FOR IP): Performed by: PHYSICIAN ASSISTANT

## 2020-07-24 PROCEDURE — 87070 CULTURE OTHR SPECIMN AEROBIC: CPT

## 2020-07-24 PROCEDURE — 6370000000 HC RX 637 (ALT 250 FOR IP): Performed by: PODIATRIST

## 2020-07-24 PROCEDURE — 96365 THER/PROPH/DIAG IV INF INIT: CPT

## 2020-07-24 RX ORDER — METRONIDAZOLE 250 MG/1
500 TABLET ORAL ONCE
Status: COMPLETED | OUTPATIENT
Start: 2020-07-24 | End: 2020-07-24

## 2020-07-24 RX ORDER — POLYETHYLENE GLYCOL 3350 17 G/17G
17 POWDER, FOR SOLUTION ORAL DAILY PRN
Status: DISCONTINUED | OUTPATIENT
Start: 2020-07-24 | End: 2020-07-28 | Stop reason: HOSPADM

## 2020-07-24 RX ORDER — HYDRALAZINE HYDROCHLORIDE 20 MG/ML
5 INJECTION INTRAMUSCULAR; INTRAVENOUS EVERY 10 MIN PRN
Status: DISCONTINUED | OUTPATIENT
Start: 2020-07-24 | End: 2020-07-24 | Stop reason: HOSPADM

## 2020-07-24 RX ORDER — ONDANSETRON 2 MG/ML
4 INJECTION INTRAMUSCULAR; INTRAVENOUS EVERY 6 HOURS PRN
Status: DISCONTINUED | OUTPATIENT
Start: 2020-07-24 | End: 2020-07-28 | Stop reason: HOSPADM

## 2020-07-24 RX ORDER — MEPERIDINE HYDROCHLORIDE 25 MG/ML
12.5 INJECTION INTRAMUSCULAR; INTRAVENOUS; SUBCUTANEOUS EVERY 5 MIN PRN
Status: DISCONTINUED | OUTPATIENT
Start: 2020-07-24 | End: 2020-07-24 | Stop reason: HOSPADM

## 2020-07-24 RX ORDER — LIDOCAINE HYDROCHLORIDE 20 MG/ML
INJECTION, SOLUTION INFILTRATION; PERINEURAL PRN
Status: DISCONTINUED | OUTPATIENT
Start: 2020-07-24 | End: 2020-07-24 | Stop reason: SDUPTHER

## 2020-07-24 RX ORDER — SODIUM CHLORIDE 0.9 % (FLUSH) 0.9 %
10 SYRINGE (ML) INJECTION EVERY 12 HOURS SCHEDULED
Status: DISCONTINUED | OUTPATIENT
Start: 2020-07-24 | End: 2020-07-28 | Stop reason: HOSPADM

## 2020-07-24 RX ORDER — OXYCODONE HYDROCHLORIDE AND ACETAMINOPHEN 5; 325 MG/1; MG/1
1 TABLET ORAL PRN
Status: DISCONTINUED | OUTPATIENT
Start: 2020-07-24 | End: 2020-07-24 | Stop reason: HOSPADM

## 2020-07-24 RX ORDER — MAGNESIUM HYDROXIDE 1200 MG/15ML
LIQUID ORAL CONTINUOUS PRN
Status: COMPLETED | OUTPATIENT
Start: 2020-07-24 | End: 2020-07-24

## 2020-07-24 RX ORDER — 0.9 % SODIUM CHLORIDE 0.9 %
30 INTRAVENOUS SOLUTION INTRAVENOUS ONCE
Status: COMPLETED | OUTPATIENT
Start: 2020-07-24 | End: 2020-07-24

## 2020-07-24 RX ORDER — MORPHINE SULFATE 2 MG/ML
2 INJECTION, SOLUTION INTRAMUSCULAR; INTRAVENOUS
Status: DISCONTINUED | OUTPATIENT
Start: 2020-07-24 | End: 2020-07-28 | Stop reason: HOSPADM

## 2020-07-24 RX ORDER — OXYCODONE HYDROCHLORIDE AND ACETAMINOPHEN 5; 325 MG/1; MG/1
2 TABLET ORAL PRN
Status: DISCONTINUED | OUTPATIENT
Start: 2020-07-24 | End: 2020-07-24 | Stop reason: HOSPADM

## 2020-07-24 RX ORDER — ACETAMINOPHEN 500 MG
1000 TABLET ORAL ONCE
Status: COMPLETED | OUTPATIENT
Start: 2020-07-24 | End: 2020-07-24

## 2020-07-24 RX ORDER — LORATADINE 10 MG/1
10 TABLET ORAL NIGHTLY PRN
Status: ON HOLD | COMMUNITY
End: 2022-01-01 | Stop reason: HOSPADM

## 2020-07-24 RX ORDER — POTASSIUM CHLORIDE 20 MEQ/1
40 TABLET, EXTENDED RELEASE ORAL PRN
Status: DISCONTINUED | OUTPATIENT
Start: 2020-07-24 | End: 2020-07-28 | Stop reason: HOSPADM

## 2020-07-24 RX ORDER — TIZANIDINE 4 MG/1
4 TABLET ORAL NIGHTLY
Status: DISCONTINUED | OUTPATIENT
Start: 2020-07-24 | End: 2020-07-28 | Stop reason: HOSPADM

## 2020-07-24 RX ORDER — LABETALOL HYDROCHLORIDE 5 MG/ML
5 INJECTION, SOLUTION INTRAVENOUS EVERY 10 MIN PRN
Status: DISCONTINUED | OUTPATIENT
Start: 2020-07-24 | End: 2020-07-24 | Stop reason: HOSPADM

## 2020-07-24 RX ORDER — DIPHENHYDRAMINE HYDROCHLORIDE 50 MG/ML
12.5 INJECTION INTRAMUSCULAR; INTRAVENOUS
Status: DISCONTINUED | OUTPATIENT
Start: 2020-07-24 | End: 2020-07-24 | Stop reason: HOSPADM

## 2020-07-24 RX ORDER — MORPHINE SULFATE 10 MG/ML
2 INJECTION, SOLUTION INTRAMUSCULAR; INTRAVENOUS EVERY 5 MIN PRN
Status: DISCONTINUED | OUTPATIENT
Start: 2020-07-24 | End: 2020-07-24 | Stop reason: HOSPADM

## 2020-07-24 RX ORDER — PROMETHAZINE HYDROCHLORIDE 25 MG/ML
6.25 INJECTION, SOLUTION INTRAMUSCULAR; INTRAVENOUS
Status: DISCONTINUED | OUTPATIENT
Start: 2020-07-24 | End: 2020-07-24 | Stop reason: HOSPADM

## 2020-07-24 RX ORDER — PAROXETINE HYDROCHLORIDE 20 MG/1
10 TABLET, FILM COATED ORAL EVERY MORNING
Status: DISCONTINUED | OUTPATIENT
Start: 2020-07-25 | End: 2020-07-28 | Stop reason: HOSPADM

## 2020-07-24 RX ORDER — MORPHINE SULFATE 10 MG/ML
1 INJECTION, SOLUTION INTRAMUSCULAR; INTRAVENOUS EVERY 5 MIN PRN
Status: DISCONTINUED | OUTPATIENT
Start: 2020-07-24 | End: 2020-07-24 | Stop reason: HOSPADM

## 2020-07-24 RX ORDER — SODIUM CHLORIDE, SODIUM LACTATE, POTASSIUM CHLORIDE, CALCIUM CHLORIDE 600; 310; 30; 20 MG/100ML; MG/100ML; MG/100ML; MG/100ML
INJECTION, SOLUTION INTRAVENOUS CONTINUOUS PRN
Status: DISCONTINUED | OUTPATIENT
Start: 2020-07-24 | End: 2020-07-24 | Stop reason: SDUPTHER

## 2020-07-24 RX ORDER — POTASSIUM CHLORIDE 7.45 MG/ML
10 INJECTION INTRAVENOUS PRN
Status: DISCONTINUED | OUTPATIENT
Start: 2020-07-24 | End: 2020-07-28 | Stop reason: HOSPADM

## 2020-07-24 RX ORDER — PROPOFOL 10 MG/ML
INJECTION, EMULSION INTRAVENOUS PRN
Status: DISCONTINUED | OUTPATIENT
Start: 2020-07-24 | End: 2020-07-24 | Stop reason: SDUPTHER

## 2020-07-24 RX ORDER — ACETAMINOPHEN 650 MG/1
650 SUPPOSITORY RECTAL EVERY 6 HOURS PRN
Status: DISCONTINUED | OUTPATIENT
Start: 2020-07-24 | End: 2020-07-28 | Stop reason: HOSPADM

## 2020-07-24 RX ORDER — GABAPENTIN 400 MG/1
800 CAPSULE ORAL 3 TIMES DAILY
Status: DISCONTINUED | OUTPATIENT
Start: 2020-07-24 | End: 2020-07-28 | Stop reason: HOSPADM

## 2020-07-24 RX ORDER — MAGNESIUM SULFATE IN WATER 40 MG/ML
2 INJECTION, SOLUTION INTRAVENOUS PRN
Status: DISCONTINUED | OUTPATIENT
Start: 2020-07-24 | End: 2020-07-28 | Stop reason: HOSPADM

## 2020-07-24 RX ORDER — LOSARTAN POTASSIUM 25 MG/1
50 TABLET ORAL DAILY
Status: DISCONTINUED | OUTPATIENT
Start: 2020-07-25 | End: 2020-07-28 | Stop reason: HOSPADM

## 2020-07-24 RX ORDER — LINEZOLID 2 MG/ML
600 INJECTION, SOLUTION INTRAVENOUS EVERY 12 HOURS
Status: DISCONTINUED | OUTPATIENT
Start: 2020-07-24 | End: 2020-07-27

## 2020-07-24 RX ORDER — DEXTROSE MONOHYDRATE 50 MG/ML
100 INJECTION, SOLUTION INTRAVENOUS PRN
Status: DISCONTINUED | OUTPATIENT
Start: 2020-07-24 | End: 2020-07-28 | Stop reason: HOSPADM

## 2020-07-24 RX ORDER — ACETAMINOPHEN 325 MG/1
650 TABLET ORAL EVERY 6 HOURS PRN
Status: DISCONTINUED | OUTPATIENT
Start: 2020-07-24 | End: 2020-07-28 | Stop reason: HOSPADM

## 2020-07-24 RX ORDER — DEXTROSE MONOHYDRATE 25 G/50ML
12.5 INJECTION, SOLUTION INTRAVENOUS PRN
Status: DISCONTINUED | OUTPATIENT
Start: 2020-07-24 | End: 2020-07-28 | Stop reason: HOSPADM

## 2020-07-24 RX ORDER — SODIUM CHLORIDE 9 MG/ML
INJECTION, SOLUTION INTRAVENOUS
Status: DISPENSED
Start: 2020-07-24 | End: 2020-07-25

## 2020-07-24 RX ORDER — TRAZODONE HYDROCHLORIDE 50 MG/1
50 TABLET ORAL NIGHTLY
Status: DISCONTINUED | OUTPATIENT
Start: 2020-07-24 | End: 2020-07-27

## 2020-07-24 RX ORDER — INSULIN GLARGINE 100 [IU]/ML
48 INJECTION, SOLUTION SUBCUTANEOUS NIGHTLY
Status: DISCONTINUED | OUTPATIENT
Start: 2020-07-24 | End: 2020-07-25

## 2020-07-24 RX ORDER — PROMETHAZINE HYDROCHLORIDE 25 MG/1
12.5 TABLET ORAL EVERY 6 HOURS PRN
Status: DISCONTINUED | OUTPATIENT
Start: 2020-07-24 | End: 2020-07-28 | Stop reason: HOSPADM

## 2020-07-24 RX ORDER — ATORVASTATIN CALCIUM 10 MG/1
10 TABLET, FILM COATED ORAL DAILY
Status: DISCONTINUED | OUTPATIENT
Start: 2020-07-24 | End: 2020-07-28 | Stop reason: HOSPADM

## 2020-07-24 RX ORDER — CARVEDILOL 6.25 MG/1
12.5 TABLET ORAL 2 TIMES DAILY WITH MEALS
Status: DISCONTINUED | OUTPATIENT
Start: 2020-07-24 | End: 2020-07-28 | Stop reason: HOSPADM

## 2020-07-24 RX ORDER — NICOTINE POLACRILEX 4 MG
15 LOZENGE BUCCAL PRN
Status: DISCONTINUED | OUTPATIENT
Start: 2020-07-24 | End: 2020-07-28 | Stop reason: HOSPADM

## 2020-07-24 RX ORDER — ONDANSETRON 2 MG/ML
4 INJECTION INTRAMUSCULAR; INTRAVENOUS PRN
Status: DISCONTINUED | OUTPATIENT
Start: 2020-07-24 | End: 2020-07-24 | Stop reason: HOSPADM

## 2020-07-24 RX ORDER — SODIUM CHLORIDE 0.9 % (FLUSH) 0.9 %
10 SYRINGE (ML) INJECTION PRN
Status: DISCONTINUED | OUTPATIENT
Start: 2020-07-24 | End: 2020-07-28 | Stop reason: HOSPADM

## 2020-07-24 RX ADMIN — ACETAMINOPHEN 650 MG: 325 TABLET ORAL at 21:01

## 2020-07-24 RX ADMIN — SODIUM CHLORIDE, POTASSIUM CHLORIDE, SODIUM LACTATE AND CALCIUM CHLORIDE: 600; 310; 30; 20 INJECTION, SOLUTION INTRAVENOUS at 18:45

## 2020-07-24 RX ADMIN — INSULIN GLARGINE 48 UNITS: 100 INJECTION, SOLUTION SUBCUTANEOUS at 22:26

## 2020-07-24 RX ADMIN — TIZANIDINE 4 MG: 4 TABLET ORAL at 21:02

## 2020-07-24 RX ADMIN — INSULIN HUMAN 5 UNITS: 100 INJECTION, SOLUTION PARENTERAL at 14:34

## 2020-07-24 RX ADMIN — METRONIDAZOLE 500 MG: 500 INJECTION, SOLUTION INTRAVENOUS at 22:21

## 2020-07-24 RX ADMIN — LIDOCAINE HYDROCHLORIDE 40 MG: 20 INJECTION, SOLUTION INFILTRATION; PERINEURAL at 18:45

## 2020-07-24 RX ADMIN — SODIUM CHLORIDE 3471 ML: 9 INJECTION, SOLUTION INTRAVENOUS at 12:57

## 2020-07-24 RX ADMIN — HYDROMORPHONE HYDROCHLORIDE 0.5 MG: 1 INJECTION, SOLUTION INTRAMUSCULAR; INTRAVENOUS; SUBCUTANEOUS at 19:40

## 2020-07-24 RX ADMIN — PROPOFOL 200 MG: 10 INJECTION, EMULSION INTRAVENOUS at 18:45

## 2020-07-24 RX ADMIN — CEFEPIME HYDROCHLORIDE 2 G: 2 INJECTION, POWDER, FOR SOLUTION INTRAVENOUS at 12:57

## 2020-07-24 RX ADMIN — TRAZODONE HYDROCHLORIDE 50 MG: 50 TABLET ORAL at 21:02

## 2020-07-24 RX ADMIN — INSULIN LISPRO 1 UNITS: 100 INJECTION, SOLUTION INTRAVENOUS; SUBCUTANEOUS at 22:26

## 2020-07-24 RX ADMIN — ACETAMINOPHEN 1000 MG: 500 TABLET ORAL at 12:57

## 2020-07-24 RX ADMIN — CEFEPIME 2 G: 2 INJECTION, POWDER, FOR SOLUTION INTRAVENOUS at 21:01

## 2020-07-24 RX ADMIN — LINEZOLID 600 MG: 600 INJECTION, SOLUTION INTRAVENOUS at 13:47

## 2020-07-24 RX ADMIN — CARVEDILOL 12.5 MG: 6.25 TABLET, FILM COATED ORAL at 21:02

## 2020-07-24 RX ADMIN — ATORVASTATIN CALCIUM 10 MG: 10 TABLET, FILM COATED ORAL at 21:02

## 2020-07-24 RX ADMIN — PROPOFOL 100 MG: 10 INJECTION, EMULSION INTRAVENOUS at 18:55

## 2020-07-24 RX ADMIN — METRONIDAZOLE 500 MG: 250 TABLET ORAL at 13:47

## 2020-07-24 RX ADMIN — GABAPENTIN 800 MG: 400 CAPSULE ORAL at 21:02

## 2020-07-24 ASSESSMENT — PULMONARY FUNCTION TESTS
PIF_VALUE: 0

## 2020-07-24 ASSESSMENT — ENCOUNTER SYMPTOMS
DIARRHEA: 0
SINUS PRESSURE: 0
ABDOMINAL PAIN: 0
SORE THROAT: 0
CONSTIPATION: 0
EYE DISCHARGE: 0
SHORTNESS OF BREATH: 0
VOMITING: 0
NAUSEA: 0
CHEST TIGHTNESS: 0
COUGH: 0
RHINORRHEA: 0
SINUS PAIN: 0
EYE REDNESS: 0

## 2020-07-24 ASSESSMENT — PAIN DESCRIPTION - ONSET: ONSET: ON-GOING

## 2020-07-24 ASSESSMENT — PAIN DESCRIPTION - FREQUENCY: FREQUENCY: CONTINUOUS

## 2020-07-24 ASSESSMENT — PAIN SCALES - GENERAL
PAINLEVEL_OUTOF10: 5
PAINLEVEL_OUTOF10: 3
PAINLEVEL_OUTOF10: 5
PAINLEVEL_OUTOF10: 3
PAINLEVEL_OUTOF10: 4
PAINLEVEL_OUTOF10: 6
PAINLEVEL_OUTOF10: 0

## 2020-07-24 ASSESSMENT — PAIN DESCRIPTION - DIRECTION: RADIATING_TOWARDS: ANKLE

## 2020-07-24 ASSESSMENT — PAIN DESCRIPTION - ORIENTATION: ORIENTATION: LEFT

## 2020-07-24 ASSESSMENT — PAIN - FUNCTIONAL ASSESSMENT: PAIN_FUNCTIONAL_ASSESSMENT: PREVENTS OR INTERFERES SOME ACTIVE ACTIVITIES AND ADLS

## 2020-07-24 ASSESSMENT — PAIN DESCRIPTION - PAIN TYPE: TYPE: SURGICAL PAIN

## 2020-07-24 ASSESSMENT — PAIN DESCRIPTION - LOCATION: LOCATION: FOOT

## 2020-07-24 ASSESSMENT — PAIN DESCRIPTION - PROGRESSION: CLINICAL_PROGRESSION: NOT CHANGED

## 2020-07-24 ASSESSMENT — PAIN DESCRIPTION - DESCRIPTORS: DESCRIPTORS: THROBBING

## 2020-07-24 NOTE — ED PROVIDER NOTES
I independently evaluated and obtained a history and physical on Casey De Anda. All diagnostic, treatment, and disposition assistants were made to myself in conjunction the advanced practice provider. For further details of this patient's emergency department encounter, please see the advanced practice provider's documentation. History: Patient is a 74-year-old male with a history of diabetes and ulcers who presents with left great toe ulceration, redness, erythema, and signs of infection. The patient denies any subjective fever despite being febrile on arrival.  He denies any altered mental status. Reports his symptoms are severe, constant, and worsening. Physician Exam: Is an obese  male in no acute distress. Diabetic ulcer to left great toe with surrounding erythema and signs of cellulitis. Full DP pulses. MDM: Concerns for acute osteomyelitis. Broad-spectrum antibiotics are started and dietary is consulted. The patient is admitted for further management. FINAL IMPRESSION      1. Gangrene of foot ([address]    2. Type 2 diabetes mellitus with other skin ulcer, with long-term current use of insulin ([address]    3. Hyperglycemia    4.  Septicemia ([address]             Keila Cadena MD  07/25/20 2991

## 2020-07-24 NOTE — BRIEF OP NOTE
Brief Postoperative Note      Patient: Amparo Awad  YOB: 1975  MRN: [id number]    Date of Procedure: 7/24/2020    Pre-Op Diagnosis: GAS GANGRENE    Post-Op Diagnosis: Same       Procedure(s):  PARTIAL LEFT FOOT AMPUTATION    Surgeon(s):  Sam Jennings DPM    Assistant:  Surgical Assistant: Caryle Matters    Anesthesia: Monitor Anesthesia Care    Estimated Blood Loss (mL): less than 50     Complications: None    Specimens:   ID Type Source Tests Collected by Time Destination   1 :  Specimen Foot CULTURE, SURGICAL Sam Jennings DPM 7/24/2020 1852    A :  Specimen Foot SURGICAL PATHOLOGY Sam Jennings DPM 7/24/2020 1857        Implants:  * No implants in log *      Drains: * No LDAs found *    Findings: gas gangrene left 1st ray    Electronically signed by Sam Jennings DPM on 7/24/2020 at 7:05 PM

## 2020-07-24 NOTE — ED NOTES
Report given to [employer]. Patient being transferred to  on telemetry via wheelchair. Patient spouse at bedside, belongings sent with patient.       Hilario Lemus RN  07/24/20 0567

## 2020-07-24 NOTE — ED PROVIDER NOTES
[address]        Pt Name: Amparo Awad  MRN: [id number]  [place] 1975  Date of evaluation: 7/24/2020  Provider: Aki Rose PA-C  PCP: Mary Lema MD  ED Attending:marcelle    This patient was seen and evaluated by the attending physician   I have not independently evaluated this patient. CHIEF COMPLAINT       Chief Complaint   Patient presents with    Wound Infection     states his toe is very infected and needs amputated. cannot get in to see dr. Jonnie Orosco. pt is diabetic. HISTORY OF PRESENT ILLNESS   (Location/Symptom, Timing/Onset, Context/Setting, Quality, Duration, Modifying Factors, Severity)  Note limiting factors. Amparo Awad is a 45 y.o. male with a history of diabetes 2, CHF, cardiac myopathy, hypertension, hyperlipidemia, osteomyelitis, for evaluation of 3/10 throbbing, left foot great toe, pain and swelling advised this morning he removed his bandage and it was black. Patient is concerned that it is infected and might need amputated patient has a history of similar episodes to his right great toe. Which resulted in amputation patient has a history of diabetes patient advised over the past week he has had more difficulty with keeping his blood sugars under control. Patient advised he is not aware that he was having fevers. Nursing Notes were all reviewed and agreed with or any disagreements were addressed  in the HPI. REVIEW OF SYSTEMS  (2-9 systems for level 4, 10 or more for level 5)     Review of Systems   Constitutional: Negative for chills and fever. HENT: Negative. Negative for congestion, rhinorrhea, sinus pressure, sinus pain and sore throat. Eyes: Negative for discharge, redness and visual disturbance. Respiratory: Negative for cough, chest tightness and shortness of breath. Cardiovascular: Negative for chest pain and palpitations.    Gastrointestinal: Negative for abdominal pain, constipation, diarrhea, nausea and vomiting. Genitourinary: Negative for difficulty urinating, dysuria and frequency. Musculoskeletal: Negative. Skin: Positive for wound. Neurological: Negative. Negative for dizziness, weakness, numbness and headaches. Psychiatric/Behavioral: Negative. All other systems reviewed and are negative. Positivesand Pertinent negatives as per HPI. Except as noted above in the ROS, all other systems were reviewed and negative. PAST MEDICAL HISTORY     Past Medical History:   Diagnosis Date    Arthritis     OA    Clostridium difficile infection 11/01/2016    PCR+    Diabetes mellitus ([facility] [address]     TYPE 2- NO MEDS SINCE WEIGHT LOSS    Hyperlipidemia     Hypertension     Medial meniscus tear     LEFT    Osteoarthritis     Type II or unspecified type diabetes mellitus without mention of complication, not stated as uncontrolled          SURGICAL HISTORY       Past Surgical History:   Procedure Laterality Date    KNEE ARTHROSCOPY Left [id number]    LEFT KNEE ARTHROSCOPY , SYNOVECTOMY       OTHER SURGICAL HISTORY      PARTIAL LEFT FOOT AMPUTATION - Left    TOE AMPUTATION Right 8/23/2019    PARTIAL RIGHT FOOT AMPUTATION performed by Sahra Wisdom DPM at [facility]       Current Discharge Medication List      CONTINUE these medications which have NOT CHANGED    Details   loratadine (CLARITIN) 10 MG tablet Take 10 mg by mouth nightly      gabapentin (NEURONTIN) 400 MG capsule TAKE 2 CAPSULES BY MOUTH THREE TIMES DAILY.   Qty: 180 capsule, Refills: 0    Associated Diagnoses: Diabetic peripheral neuropathy associated with type 2 diabetes mellitus (HCC)      simvastatin (ZOCOR) 20 MG tablet TAKE 1 TABLET BY MOUTH NIGHTLY  Qty: 30 tablet, Refills: 3    Associated Diagnoses: Type 2 diabetes mellitus with diabetic polyneuropathy, with long-term current use of insulin (HCC)      losartan (COZAAR) 50 MG tablet TAKE 1 TABLET BY MOUTH DAILY  Qty: 30 tablet, Refills: 3      tiZANidine (ZANAFLEX) 4 MG tablet TAKE 1 TABLET BY MOUTH NIGHTLY AS NEEDED FOR MUSCLE SPASMS  Qty: 30 tablet, Refills: 0      traZODone (DESYREL) 50 MG tablet TAKE 1 TABLET BY MOUTH NIGHTLY  Qty: 30 tablet, Refills: 1      carvedilol (COREG) 12.5 MG tablet TAKE 1 TABLET BY MOUTH 2 TIMES DAILY (WITH MEALS)  Qty: 60 tablet, Refills: 0      furosemide (LASIX) 20 MG tablet Take 1 tablet by mouth daily  Qty: 30 tablet, Refills: 3    Associated Diagnoses: Chronic systolic congestive heart failure (HCC)      PARoxetine (PAXIL) 10 MG tablet TAKE 1 TABLET BY MOUTH EVERY MORNING  Qty: 30 tablet, Refills: 0    Associated Diagnoses: Anxiety      glimepiride (AMARYL) 4 MG tablet TAKE 1 TABLET BY MOUTH EVERY MORNING (BEFORE BREAKFAST)  Qty: 30 tablet, Refills: 0    Associated Diagnoses: Type 2 diabetes mellitus with diabetic polyneuropathy, with long-term current use of insulin ([facility])      insulin glargine (TOUJEO SOLOSTAR) 300 UNIT/ML injection pen Inject 65 Units into the skin nightly  Qty: 5 pen, Refills: 3    Associated Diagnoses: Type 2 diabetes mellitus with diabetic polyneuropathy, with long-term current use of insulin ([facility])      insulin lispro (HUMALOG KWIKPEN) 100 UNIT/ML pen Inject 15 Units into the skin 3 times daily (before meals)  Qty: 5 pen, Refills: 0    Associated Diagnoses: Type 2 diabetes mellitus with diabetic polyneuropathy, with long-term current use of insulin ([facility])      ammonium lactate (LAC-HYDRIN) 12 % cream Apply topically 2x daily.   Qty: 1 Tube, Refills: 6      sildenafil (VIAGRA) 100 MG tablet TAKE 1 TABLET BY MOUTH AS NEEDED FOR ERECTILE DYSFUNCTION  Qty: 15 tablet, Refills: 0      Insulin Syringe-Needle U-100 (AIMSC[employer] INS SYR .3CC/29GX0.5\") 29G X 1/2\" 0.3 ML MISC 1 each by Does not apply route daily  Qty: 100 each, Refills: 3    Associated Diagnoses: Type 2 diabetes mellitus with diabetic polyneuropathy, with long-term current None   Relationships    Social connections     Talks on phone: None     Gets together: None     Attends Zoroastrian service: None     Active member of club or organization: None     Attends meetings of clubs or organizations: None     Relationship status: None    Intimate partner violence     Fear of current or ex partner: None     Emotionally abused: None     Physically abused: None     Forced sexual activity: None   Other Topics Concern    None   Social History Narrative    None       SCREENINGS     NIH Score       Gl[place]ow [place] Coma Scale  Eye Opening: Spontaneous  Best Verbal Response: Oriented  Best Motor Response: Obeys commands  [place]man Coma Scale Score: 15    [facility]     Heart Score         PHYSICAL EXAM    (up to 7 for level 4, 8 ormore for level 5)     ED Triage Vitals [07/24/20 1205]   BP Temp Temp Source Pulse Resp SpO2 Height Weight   (!) 131/92 102.8 °F (39.3 °C) Oral 121 18 97 % 6' (1.829 m) 255 lb (115.7 kg)       Physical Exam  Vitals signs and nursing note reviewed. Constitutional:       Appearance: He is well-developed. He is obese. He is not diaphoretic. HENT:      Head: Normocephalic. Nose: Nose normal.      Mouth/Throat:      Pharynx: No oropharyngeal exudate. Eyes:      General:         Right eye: No discharge. Left eye: No discharge. Conjunctiva/sclera: Conjunctivae normal.      Pupils: Pupils are equal, round, and reactive to light. Neck:      Musculoskeletal: Normal range of motion. Cardiovascular:      Rate and Rhythm: Regular rhythm. Tachycardia present. Heart sounds: Normal heart sounds. No murmur. No friction rub. No gallop. Pulmonary:      Effort: Pulmonary effort is normal. No respiratory distress. Breath sounds: Normal breath sounds. No wheezing. Abdominal:      General: Bowel sounds are normal. There is no distension. Palpations: Abdomen is soft. Tenderness: There is no abdominal tenderness.    Musculoskeletal: Normal range of motion. Skin:     General: Skin is warm and dry. Neurological:      Mental Status: He is alert.    Psychiatric:         Behavior: Behavior normal.                       DIAGNOSTIC RESULTS   LABS:    Labs Reviewed   CULTURE, SURGICAL - Abnormal; Notable for the following components:       Result Value    Gram Stain Result   (*)     Value: 2+ RBC's  1+ WBC's (Polymorphonuclear)  3+ Gram positive cocci  1+ Gram negative rods      Organism Strep agalactiae (Beta Strep Group B) (*)     All other components within normal limits    Narrative:     ORDER#: [id number]                          ORDERED BY: YAMILET FENG  SOURCE: Abscess Foot Left                  COLLECTED:  07/24/20 18:52  ANTIBIOTICS AT [place]HAWN.:                      RECEIVED :  07/25/20 09:52  Performed at:  [facility],  [place]   Phone [phone]   CBC WITH AUTO DIFFERENTIAL - Abnormal; Notable for the following components:    WBC 13.0 (*)     Hematocrit 40.1 (*)     RDW 12.2 (*)     Neutrophils Absolute 10.8 (*)     Lymphocytes Absolute 0.8 (*)     All other components within normal limits    Narrative:     Performed at:  [facility] [address],  [place]   Phone [phone]   COMPREHENSIVE METABOLIC PANEL W/ REFLEX TO MG FOR LOW K - Abnormal; Notable for the following components:    Sodium 131 (*)     Chloride 90 (*)     Glucose 512 (*)     Albumin/Globulin Ratio 1.0 (*)     Total Bilirubin 1.2 (*)     Alkaline Phosphatase 281 (*)     All other components within normal limits    Narrative:     Performed at:  [facility]) - [facility],  [place]   Phone [phone]   LACTATE, SEPSIS - Abnormal; Notable for the following components:    Lactic Acid, Sepsis 2.2 (*)     All other components within normal limits    Narrative:     Performed at:  [facility]  [facility] [address],  [employer]   Phone [phone]   BASIC METABOLIC PANEL W/ REFLEX TO MG FOR LOW K - Abnormal; Notable for the following components:    Sodium 134 (*)     Chloride 97 (*)     CO2 20 (*)     Anion Gap 17 (*)     Glucose 280 (*)     CREATININE 0.7 (*)     Calcium 7.6 (*)     All other components within normal limits    Narrative:     Performed at:  [facility] [address],  [place], [employer]   Phone [phone]   CBC WITH AUTO DIFFERENTIAL - Abnormal; Notable for the following components:    RBC 3.51 (*)     Hemoglobin 11.4 (*)     Hematocrit 32.7 (*)     Monocytes Absolute 1.4 (*)     All other components within normal limits    Narrative:     Performed at:  [facility] [address],  [employer], [employer]   Phone [phone]   POCT GLUCOSE - Abnormal; Notable for the following components:    POC Glucose 283 (*)     All other components within normal limits    Narrative:     Performed at:  [place] [address],  [employer], [employer]   Phone [phone]   POCT GLUCOSE - Abnormal; Notable for the following components:    POC Glucose 241 (*)     All other components within normal limits    Narrative:     Performed at:  [facility]) - [facility] [address],  [place], [employer]   Phone [phone]   POCT GLUCOSE - Abnormal; Notable for the following components:    POC Glucose 231 (*)     All other components within normal limits    Narrative:     Performed at:  [facility] [address],  [place], [employer]   Phone [phone]   POCT GLUCOSE - Abnormal; Notable for the following components:    POC Glucose 311 (*)     All other components within normal limits    Narrative:     Performed at:  [facility],  [employer], [employer]   Phone [phone]   POCT GLUCOSE - Abnormal; Notable for the following components:    POC Glucose 289 (*)     All other components within normal limits    Narrative:     Performed at:  [facility] [address],  [place], [employer]   Phone [phone]   POCT GLUCOSE - Abnormal; Notable for the following components:    POC Glucose 237 (*)     All other components within normal limits    Narrative:     Performed at:  [facility] [address],  [place], [employer]   Phone [phone]   POCT GLUCOSE - Abnormal; Notable for the following components:    POC Glucose 297 (*)     All other components within normal limits    Narrative:     Performed at:  [facility] [address],  [place], [employer]   Phone [phone]   CULTURE, BLOOD 1    Narrative:     ORDER#: [id number]                          ORDERED BY: Cassia Meeks  SOURCE: Blood Antecubital-Lef              COLLECTED:  07/24/20 12:40  ANTIBIOTICS AT [place]HAWN.:                      RECEIVED :  07/24/20 18:45  If child <=2 yrs old please draw pediatric bottle. ~Blood Culture #1  Performed at:  [facility]  [address] [facility] [address]   Phone [phone]   CULTURE, BLOOD 2    Narrative:     ORDER#: [id number]                          ORDERED BY: MARIZA PRESSLEY  SOURCE: Blood Antecubital-Rig              COLLECTED:  07/24/20 12:48  ANTIBIOTICS AT [place]HA[place].:                      RECEIVED :  07/24/20 18:47  If child <=2 yrs old please draw pediatric bottle. ~Blood Culture #2  Performed at:  [facility]  [address] [facility] [address]   Phone [phone]   LACTATE, SEPSIS    Narrative:     Performed at:  [facility] [address],  [place], [employer]   Phone [phone]   MAGNESIUM    Narrative:     Performed at:  [facility]) - [facility]  [address],  [place], [facility]   Phone [phone]   SURGICAL PATHOLOGY   HEMOGLOBIN A1C       All other labs were within normal range or notreturned as of this dictation. EKG: All EKG's are interpreted by the Emergency Department Physician who either signs or Co-signs this chart in the absence of a cardiologist.  Please see their note for interpretation of EKG. RADIOLOGY:     Interpretation per the Radiologist below, if available at the time of this note:    XR TOE LEFT (MIN 2 VIEWS)   Final Result   Suspect osteomyelitis medial base of the 1st distal phalanx with moderate   soft tissue edema and soft tissue emphysema. CRITICAL CARE TIME   Total critical care time today provided was 40 minutes. This excludes seperately billable procedures and family discussion time. Provided for osteomyelitis with emphasis medic gangrene. Requiring intervention with concern for potential decompensation, numerous re-evaluations and coordination of numerous services.       CONSULTS:  IP CONSULT TO PODIATRY  IP CONSULT TO HOSPITALIST  IP CONSULT TO PODIATRY      EMERGENCY DEPARTMENT COURSE and DIFFERENTIAL DIAGNOSIS/MDM:   Vitals:    Vitals:    07/25/20 1238 07/25/20 1445 07/25/20 1830 07/25/20 2115   BP:  (!) 144/87  139/86   Pulse:  106  98   Resp:  18  18   Temp:  101.2 °F (38.4 °C) 99.4 °F (37.4 °C) 100.5 °F (38.1 °C)   TempSrc:  Oral  Oral   SpO2:  96%     Weight:       Height: 6' (1.829 m)          Patient was given the following medications:  Medications   linezolid (ZYVOX) IVPB 600 mg (0 mg Intravenous Stopped 7/25/20 1528)   carvedilol (COREG) tablet 12.5 mg (12.5 mg Oral Given 7/25/20 1635)   gabapentin (NEURONTIN) capsule 800 mg (800 mg Oral Given 7/25/20 2131)   insulin lispro (HUMALOG) injection vial 15 Units (15 Units Subcutaneous Given 7/25/20 1637)   losartan (COZAAR) tablet 50 mg (50 mg Oral Given 7/25/20 0951)   PARoxetine (PAXIL) tablet 10 mg (10 mg Oral Given 7/25/20 0952)   atorvastatin (LIPITOR) tablet 10 mg (10 mg Oral Given 7/25/20 0952)   tiZANidine (ZANAFLEX) tablet 4 mg (4 mg Oral Given 7/25/20 2131)   traZODone (DESYREL) tablet 50 mg (50 mg Oral Given 7/25/20 2131)   glucose (GLUTOSE) 40 % oral gel 15 g (has no administration in time range)   dextrose 50 % IV solution (has no administration in time range)   glucagon (rDNA) injection 1 mg (has no administration in time range)   dextrose 5 % solution (has no administration in time range)   metronidazole (FLAGYL) 500 mg in NaCl 100 mL IVPB premix (0 mg Intravenous Stopped 7/25/20 2337)   cefepime (MAXIPIME) 2 g IVPB minibag (0 g Intravenous Stopped 7/25/20 2201)   sodium chloride flush 0.9 % injection 10 mL (10 mLs Intravenous Given 7/25/20 2146)   sodium chloride flush 0.9 % injection 10 mL (has no administration in time range)   acetaminophen (TYLENOL) tablet 650 mg (650 mg Oral Given 7/25/20 1459)     Or   acetaminophen (TYLENOL) suppository 650 mg ( Rectal See Alternative 7/25/20 1459)   polyethylene glycol (GLYCOLAX) packet 17 g (has no administration in time range)   promethazine (PHENERGAN) tablet 12.5 mg (has no administration in time range)     Or   ondansetron (ZOFRAN) injection 4 mg (has no administration in time range)   enoxaparin (LOVENOX) injection 40 mg (40 mg Subcutaneous Given 7/25/20 0952)   potassium chloride (KLOR-CON M) extended release tablet 40 mEq (has no administration in time range)     Or   potassium bicarb-citric acid (EFFER-K) effervescent tablet 40 mEq (has no administration in time range)     Or   potassium chloride 10 mEq/100 mL IVPB (Peripheral Line) (has no administration in time range)   magnesium sulfate 2 g in 50 mL IVPB premix (has no administration in time range)   sodium chloride 0.9 % infusion (has no administration in time range)   morphine (PF) injection 2 mg (has no administration in time range)   insulin lispro (HUMALOG) injection vial 0-12 Units (6 Units Subcutaneous Given 7/25/20 1637)   insulin lispro (HUMALOG) injection vial 0-6 Units (3 Units Subcutaneous Given 7/25/20 2139)   cetirizine (ZYRTEC) tablet 10 mg (10 mg Oral Given 7/25/20 1215)   fluticasone (FLONASE) 50 MCG/ACT nasal spray 1 spray (1 spray Each Nostril Given 7/25/20 1746)   diphenhydrAMINE (BENADRYL) tablet 25 mg (has no administration in time range)   insulin glargine (LANTUS) injection vial 55 Units (55 Units Subcutaneous Given 7/25/20 2139)   oxyCODONE-acetaminophen (PERCOCET) 5-325 MG per tablet 1 tablet (1 tablet Oral Given 7/25/20 2139)   acetaminophen (TYLENOL) tablet 1,000 mg (1,000 mg Oral Given 7/24/20 1257)   cefepime (MAXIPIME) 2 g IVPB minibag (0 g Intravenous Stopped 7/24/20 1340)   0.9 % sodium chloride IV bolus 3,471 mL (0 mLs Intravenous Handoff 7/24/20 1721)   metroNIDAZOLE (FLAGYL) tablet 500 mg (500 mg Oral Given 7/24/20 1347)   insulin regular (HUMULIN R;NOVOLIN R) injection 5 Units (5 Units Intravenous Given 7/24/20 1434)   sodium chloride 0.9 % irrigation (3,000 mLs Irrigation New Bag 7/24/20 1900)   sodium chloride 0.9 % infusion (250 mLs  New Bag 7/25/20 0518)         Afebrile, stable, patient presents to the ED for evaluation. Seen in conjunction with attending ED provider who agrees with assessment and plan. Patient with impressive wound to left foot toe is black and odorous of gangrene. SPO2 on room air of 97% he is not hypoxic he has had past right great toe amputation secondary to a diabetic wound. He has concern that his left foot may require great toe amputation. Patient is got a fever of 102.8 on arrival he is hypertensive with a blood pressure 137/94 his heart rate was in the 120s ordered IV fluids Tylenol for  Concern for patient developing developing sepsis ordered patient IV antibiotics and IV fluids. Which will help with his tachycardia. Patient is also provided with pain control and 5 units of IV insulin. To help drop his hyperglycemia.   Concern as x-rays confirm what we can see clinically as the diabetic ulcer can look straight to patient's bone osteomyelitis and the odorous wound is suspicious for gangrene. Consult to Dr. Prateek Burgos who agrees to accept the patient consult to the hospitalist for admission in stable condition. Full code  All questions are answered. Indications for return to the ED are discussed. Patient is advised if any new or worsening symptoms arise they should immediately return to the emergency room. Follow-up with primary care in 1-2 days. The patient tolerated their visit well. They were seen and evaluated by the attendingphysician, No att. providers found who agreed with the assessment and plan. The patient and / or the family were informed of the results of any tests, a time was given to answer questions, a plan was proposed and they agreed Xuan Bui. FINAL IMPRESSION      1. Gangrene of foot ([facility] [address]    2. Type 2 diabetes mellitus with other skin ulcer, with long-term current use of insulin ([facility] [address]    3. Hyperglycemia    4.  Septicemia [facility])          DISPOSITION/PLAN   DISPOSITION Admitted 07/24/2020 02:16:47 PM      PATIENT REFERRED TO:  Tim Figueroa MD  [address] [facility]  [phone]            DISCHARGE MEDICATIONS:  Current Discharge Medication List          DISCONTINUED MEDICATIONS:  Current Discharge Medication List                 (Please note that portions of this note were completed with a voice recognition program.  Efforts were made to edit the dictations but occasionally words are mis-transcribed.)    Louann Skiff, PA-C (electronically signed)       Louann Skiff, PA-C  07/26/20 0045

## 2020-07-24 NOTE — ANESTHESIA PRE PROCEDURE
Department of Anesthesiology  Preprocedure Note       Name:  Deric Pantoja   Age:  45 y.o.  :  1975                                          MRN:  [id number]         Date:  2020      Surgeon: Isael Aguirre):  Tere Chun DPM    Procedure: Procedure(s):  PARTIAL LEFT FOOT AMPUTATION    Medications prior to admission:   Prior to Admission medications    Medication Sig Start Date End Date Taking? Authorizing Provider   gabapentin (NEURONTIN) 400 MG capsule TAKE 2 CAPSULES BY MOUTH THREE TIMES DAILY.  20 Yes Jeffrey Simmonds, MD   simvastatin (ZOCOR) 20 MG tablet TAKE 1 TABLET BY MOUTH NIGHTLY 20  Yes Jeffrey Simmonds, MD   losartan (COZAAR) 50 MG tablet TAKE 1 TABLET BY MOUTH DAILY 20  Yes Jeffrey Simmonds, MD   tiZANidine (ZANAFLEX) 4 MG tablet TAKE 1 TABLET BY MOUTH NIGHTLY AS NEEDED FOR MUSCLE SPASMS 20  Yes Jeffrey Simmonds, MD   traZODone (DESYREL) 50 MG tablet TAKE 1 TABLET BY MOUTH NIGHTLY 20  Yes Jeffrey Simmonds, MD   carvedilol (COREG) 12.5 MG tablet TAKE 1 TABLET BY MOUTH 2 TIMES DAILY (WITH MEALS) 20  Yes Jeffrey Simmonds, MD   furosemide (LASIX) 20 MG tablet Take 1 tablet by mouth daily 3/2/20  Yes Jeffrey Simmonds, MD   PARoxetine (PAXIL) 10 MG tablet TAKE 1 TABLET BY MOUTH EVERY MORNING 20  Yes Jeffrey Simmonds, MD   glimepiride (AMARYL) 4 MG tablet TAKE 1 TABLET BY MOUTH EVERY MORNING (BEFORE BREAKFAST) 20  Yes Jeffrey Simmonds, MD   insulin glargine (TOUJEO SOLOSTAR) 300 UNIT/ML injection pen Inject 65 Units into the skin nightly  Patient taking differently: Inject 60 Units into the skin nightly  19  Yes Jeffrey Simmonds, MD   insulin lispro (HUMALOG KWIKPEN) 100 UNIT/ML pen Inject 15 Units into the skin 3 times daily (before meals)  Patient taking differently: Inject into the skin 3 times daily (before meals) Patient takes per sliding scale 19  Yes Luiz Good MD   ammonium lactate (LAC-HYDRIN) 12 % cream Apply topically 2x daily. 9/5/19   Lula Maddox DPM   sildenafil (VIAGRA) 100 MG tablet TAKE 1 TABLET BY MOUTH AS NEEDED FOR ERECTILE DYSFUNCTION 8/27/19   Leticia Dean MD   Insulin Syringe-Needle U-100 ([employer] INS SYR .3CC/29GX0.5\") 29G X 1/2\" 0.3 ML MISC 1 each by Does not apply route daily 8/22/19   Rell Resendez PA-C   blood glucose test strips (CONTOUR NEXT TEST) strip USE TO TEST BLOOD SUGAR LEVELS 3 TIMES DAILY 8/8/19   Leticia Dean MD   Lancets MISC Test three times a day with Contour Next EZ machine. DX: E11.9 11/16/17   Leticia Dean MD   glucose blood VI test strips (EXACTECH TEST) strip 1 each by In Vitro route daily. As needed. 9/5/14   Leticia Dean MD       Current medications:    Current Facility-Administered Medications   Medication Dose Route Frequency Provider Last Rate Last Dose    linezolid (ZYVOX) IVPB 600 mg  600 mg Intravenous Q12H Sherly Cruz PA-C 300 mL/hr at 07/24/20 1347 600 mg at 07/24/20 1347     Current Outpatient Medications   Medication Sig Dispense Refill    gabapentin (NEURONTIN) 400 MG capsule TAKE 2 CAPSULES BY MOUTH THREE TIMES DAILY.  180 capsule 0    simvastatin (ZOCOR) 20 MG tablet TAKE 1 TABLET BY MOUTH NIGHTLY 30 tablet 3    losartan (COZAAR) 50 MG tablet TAKE 1 TABLET BY MOUTH DAILY 30 tablet 3    tiZANidine (ZANAFLEX) 4 MG tablet TAKE 1 TABLET BY MOUTH NIGHTLY AS NEEDED FOR MUSCLE SPASMS 30 tablet 0    traZODone (DESYREL) 50 MG tablet TAKE 1 TABLET BY MOUTH NIGHTLY 30 tablet 1    carvedilol (COREG) 12.5 MG tablet TAKE 1 TABLET BY MOUTH 2 TIMES DAILY (WITH MEALS) 60 tablet 0    furosemide (LASIX) 20 MG tablet Take 1 tablet by mouth daily 30 tablet 3    PARoxetine (PAXIL) 10 MG tablet TAKE 1 TABLET BY MOUTH EVERY MORNING 30 tablet 0    glimepiride (AMARYL) 4 MG tablet TAKE 1 TABLET BY MOUTH EVERY MORNING (BEFORE BREAKFAST) 30 tablet 0    insulin glargine (TOUJEO SOLOSTAR) 300 UNIT/ML injection pen E11.621, L97.509, E11.40    Osteomyelitis due to type 2 diabetes mellitus ([facility] E11.69, M86.9    Diabetic foot infection ([facility] E11.628, L08.9    Partial nontraumatic amputation of right foot (HCC) 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.431    Non compliance w medication regimen Z91.14    Toe osteomyelitis, left (HCC) M86.9       Past Medical History:        Diagnosis Date    Arthritis     OA    Clostridium difficile infection 2016    PCR+    Diabetes mellitus ([facility]     TYPE 2- NO MEDS SINCE WEIGHT LOSS    Hyperlipidemia     Hypertension     Medial meniscus tear     LEFT    Osteoarthritis     Type II or unspecified type diabetes mellitus without mention of complication, not stated as uncontrolled        Past Surgical History:        Procedure Laterality Date    KNEE ARTHROSCOPY Left [id number]    LEFT KNEE ARTHROSCOPY , SYNOVECTOMY       TOE AMPUTATION Right 2019    PARTIAL RIGHT FOOT AMPUTATION performed by Micki Caldwell DPM at [address] EXTRACTION         Social History:    Social History     Tobacco Use    Smoking status: Former Smoker     Packs/day: 0.50     Years: 2.00     Pack years: 1.00     Last attempt to quit: 2005     Years since quittin.9    Smokeless tobacco: Former User     Quit date: 2005    Tobacco comment: SMOKED OCCASIONALLY UNTIL 8 YEARS AGO   Substance Use Topics    Alcohol use: Yes     Comment: RARELY                                Counseling given: Not Answered  Comment: SMOKED OCCASIONALLY UNTIL 8 YEARS AGO      Vital Signs (Current):   Vitals:    20 1303 20 1402 20 1433 20 1503   BP: 126/82 119/84 125/83 125/82   Pulse: 117 109 104 104   Resp:       Temp: 102.8 °F (39.3 °C)      TempSrc:       SpO2: 99% 99% 98% 96%   Weight:       Height:                                                  BP Readings from Last 3 Encounters:   20 125/82   20 (!) 130/90   10/17/19 (!) 152/97       NPO Status: BMI:   Wt Readings from Last 3 Encounters:   07/24/20 255 lb (115.7 kg)   03/02/20 250 lb (113.4 kg)   10/17/19 274 lb 6.4 oz (124.5 kg)     Body mass index is 34.58 kg/m². CBC:   Lab Results   Component Value Date    WBC 13.0 07/24/2020    RBC 4.32 07/24/2020    HGB 13.8 07/24/2020    HCT 40.1 07/24/2020    MCV 92.8 07/24/2020    RDW 12.2 07/24/2020     07/24/2020       CMP:   Lab Results   Component Value Date     07/24/2020    K 4.3 07/24/2020    CL 90 07/24/2020    CO2 25 07/24/2020    BUN 17 07/24/2020    CREATININE 0.9 07/24/2020    GFRAA >60 07/24/2020    AGRATIO 1.0 07/24/2020    LABGLOM >60 07/24/2020    LABGLOM 103 09/14/2015    GLUCOSE 512 07/24/2020    PROT 7.1 07/24/2020    CALCIUM 8.9 07/24/2020    BILITOT 1.2 07/24/2020    ALKPHOS 281 07/24/2020    AST 28 07/24/2020    ALT 22 07/24/2020       POC Tests: No results for input(s): POCGLU, POCNA, POCK, POCCL, POCBUN, POCHEMO, POCHCT in the last 72 hours.     Coags:   Lab Results   Component Value Date    PROTIME 12.1 08/06/2019    INR 1.06 08/06/2019    APTT 26.2 09/01/2014       HCG (If Applicable): No results found for: PREGTESTUR, PREGSERUM, HCG, HCGQUANT     ABGs:   Lab Results   Component Value Date    PHART 7.471 09/01/2014    PO2ART 61.3 09/01/2014    DKF6NOW 27.5 09/01/2014    NZT2RWM 19.6 09/01/2014    BEART -2.1 09/01/2014    G4IHHTBK 93.3 09/01/2014        Type & Screen (If Applicable):  No results found for: LABABO, LABRH    Drug/Infectious Status (If Applicable):  No results found for: HIV, HEPCAB    COVID-19 Screening (If Applicable): No results found for: COVID19      Anesthesia Evaluation  Patient summary reviewed and Nursing notes reviewed no history of anesthetic complications:   Airway: Mallampati: III     Neck ROM: full   Dental:          Pulmonary:Negative Pulmonary ROS and normal exam                               Cardiovascular:Negative CV ROS    (+) hypertension:, CHF:, hyperlipidemia                  Neuro/Psych:   Negative Neuro/Psych ROS  (+) neuromuscular disease:,             GI/Hepatic/Renal: Neg GI/Hepatic/Renal ROS       (-) hiatal hernia and GERD       Endo/Other: Negative Endo/Other ROS   (+) Diabetes, . Abdominal:           Vascular:                                      Anesthesia Plan      general     ASA 3 - emergent     (I discussed with the patient the risks and benefits of PIV, general anesthesia, IV Narcotics, PACU. All questions were answered the patient agrees with the plan and wishes to proceed.  )  Induction: intravenous. Sinus tachycardia with occasional Premature ventricular complexesMinimal voltage criteria for LVH, may be normal variantNonspecific ST abnormalityBorderline ECGWhen compared with ECG of 01-SEP-2014 06:36,QRS axis Shifted leftConfirmed by Lenora Pimentel MD, [employer] ([id number]) on 8/1/2019 5:22:20 PM     Summary   This is a limited study for left ventricular function. .   Left ventricular function is low normal with ejection fraction estimated at   50 %. No regional wall motion abnormalities are noted. Signature      ------------------------------------------------------------------   Electronically signed by Vivien Rivera MD, [facility]   (Interpreting physician) on 12/15/2014 at 03:27 PM   ------------------------------------------------------------------    Pre-Operative Diagnosis: Toe osteomyelitis, left ([address] [M86.9]    45 y.o.   BMI:  Body mass index is 34.58 kg/m².      Vitals:    07/24/20 1303 07/24/20 1402 07/24/20 1433 07/24/20 1503   BP: 126/82 119/84 125/83 125/82   Pulse: 117 109 104 104   Resp:       Temp: 102.8 °F (39.3 °C)      TempSrc:       SpO2: 99% 99% 98% 96%   Weight:       Height:           Allergies   Allergen Reactions    Januvia [Sitagliptin] Nausea Only     Has taken metformin without side effects in the past.  Nausea with Janumet in the past.     Metformin And Related      GI Upset   

## 2020-07-24 NOTE — CONSULTS
CONSULT    Admit Date:  2020    Subjective:  45 y.o. male who is seen for evaluation of cellulitis left foot with gas gangrene. States toe wound had healed since I had seen him last.  He thought everything was going well and then developed small spot on the toe. Then all of a sudden toe turned worse overnight. Skin was coming off the toe this AM when he removed the dressing. States feels tired today. Past Medical History:        Diagnosis Date    Arthritis     OA    Clostridium difficile infection 2016    PCR+    Diabetes mellitus ([facility]     TYPE 2- NO MEDS SINCE WEIGHT LOSS    Hyperlipidemia     Hypertension     Medial meniscus tear     LEFT    Osteoarthritis     Type II or unspecified type diabetes mellitus without mention of complication, not stated as uncontrolled        Past Surgical History:        Procedure Laterality Date    KNEE ARTHROSCOPY Left [id number]    LEFT KNEE ARTHROSCOPY , SYNOVECTOMY       TOE AMPUTATION Right 2019    PARTIAL RIGHT FOOT AMPUTATION performed by Cindy Harris DPM at [facility]         Current Medications:     linezolid  600 mg Intravenous Q12H       Allergies:  Januvia [sitagliptin];  Metformin and related; Vancomycin; and Mustard oil [allyl isothiocyanate]    Social History:    Social History     Tobacco Use    Smoking status: Former Smoker     Packs/day: 0.50     Years: 2.00     Pack years: 1.00     Last attempt to quit: 2005     Years since quittin.9    Smokeless tobacco: Former User     Quit date: 2005    Tobacco comment: SMOKED OCCASIONALLY UNTIL 8 YEARS AGO   Substance Use Topics    Alcohol use: Yes     Comment: RARELY    Drug use: No       Family History:       Problem Relation Age of Onset    Diabetes Mother     High Blood Pressure Mother     High Cholesterol Mother     Diabetes Father     High Blood Pressure Father     High Cholesterol Father     Stroke Father     High Blood Pressure Sister     High Blood Pressure Maternal Uncle     High Cholesterol Maternal Uncle     High Blood Pressure Maternal Grandmother        Review of Systems    CONSTITUTIONAL:  positive for  fevers and fatigue  EYES:  negative  HEENT:  negative  RESPIRATORY:  negative  CARDIOVASCULAR:  negative  GASTROINTESTINAL:  negative  GENITOURINARY:  negative  INTEGUMENT/BREAST:  positive for ulcers  MUSCULOSKELETAL:  positive for  pain  NEUROLOGICAL:  positive for numbness      Objective:   /84   Pulse 109   Temp 102.8 °F (39.3 °C)   Resp 18   Ht 6' (1.829 m)   Wt 255 lb (115.7 kg)   SpO2 99%   BMI 34.58 kg/m²     Data:  CBC:   Recent Labs     07/24/20  1240   WBC 13.0*   HGB 13.8   HCT 40.1*   MCV 92.8        BMP:   Recent Labs     07/24/20  1240   *   K 4.3   CL 90*   CO2 25   BUN 17   CREATININE 0.9     LIVER PROFILE:   Recent Labs     07/24/20  1240   AST 28   ALT 22   BILITOT 1.2*   ALKPHOS 281*     PT/INR:   Recent Labs     07/24/20  1240   PROT 7.1     HgBA1c:  Lab Results   Component Value Date    LABA1C 11.6 03/02/2020     Lactic Acid, Sepsis 2.2High     Cultures:     Imaging: xray left foot -   There is moderate soft tissue edema with gas throughout the 1st digit from   the level of the proximal 5th phalanx towards the tuft. No foreign body. Surrounding bandage is in place. There is questionable bone loss at the base   of the distal phalanx medially with cortical margin obscured, however the gas   was somewhat obscure the bony detail. The remainder the foot shows diffuse   soft tissue edema to a lesser degree. The remainder the bones included are   unremarkable. Impression:  Suspect osteomyelitis medial base of the 1st distal phalanx with moderate   soft tissue edema and soft tissue emphysema.        Physical Exam:    General Appearance: alert and oriented to person, place and time, well developed and well- nourished, in no acute distress  Skin: warm and dry, no rash or erythema  Head: normocephalic and atraumatic  Eyes: pupils equal, round, and reactive to light, extraocular eye movements intact, conjunctivae normal  ENT: tympanic membrane, external ear and ear canal normal bilaterally, nose without deformity, nasal mucosa and turbinates normal without polyps  Neck: supple and non-tender without mass, no thyromegaly or thyroid nodules, no cervical lymphadenopathy  Pulmonary/Chest: clear to auscultation bilaterally- no wheezes, rales or rhonchi, normal air movement, no respiratory distress  Cardiovascular: normal rate, regular rhythm, normal S1 and S2, no murmurs, rubs, clicks, or gallops, distal pulses intact, no carotid bruits  Abdomen: soft, non-tender, non-distended, normal bowel sounds, no masses or organomegaly    DP/PT palpable bilateral  Ulcer dorsal and plantar aspect left hallux with gas bubbles expressed from plantar ulcer. + mottling of the toe. Purple discoloration of the toe noted. + erythema and edema of the forefoot noted. + increase in skin temperature noted. + malodor noted. Prior right hallux amputation noted.          Assessment:  Patient Active Problem List   Diagnosis Code    Hypertension I10    Diabetes mellitus type 2, uncontrolled, with complications ([facility] F32.8, E11.65    CHF (congestive heart failure) ([facility]) I50.9    Cardiomyopathy ([address] I42.9    Mixed hyperlipidemia E78.2    Diabetic peripheral neuropathy associated with type 2 diabetes mellitus ([facility] E11.42    Neuropathic diabetic ulcer of foot ([facility]) E11.621, L97.509, E11.40    Osteomyelitis due to type 2 diabetes mellitus ([facility] E11.69, M86.9    Diabetic foot infection ([facility]) E11.628, L08.9    Partial nontraumatic amputation of right foot ([facility]) Z89.431    Non compliance w medication regimen Z91.14    Toe osteomyelitis, left ([address] M86.9     Cellulitis left foot  Gas gangrene left foot  diabetic foot ulcer left hallux secondary to peripheral neuropathy  diabetes mellitus uncontrolled        Plan  Patient examined. Reviewed labs and imaging. Continue IV antibiotics. Consult ID. Due to severe diabetic foot infection with gas gangrene I recommended surgical intervention at this time. Will need hallux amputation at a minimum and possibly partial 1st metatarsal as well. Explained to patient that most likely will leave wound open. Discussed with patient and wife risks, complications, alternatives and benefits. Understands chance of nonhealing wound, infection, need for further surgery, loss of limb or life. Questions answered. Agrees to proceed with surgery. NPO now. Plan for partial left foot amputation. Will follow. Thank you for allowing me to participate in the care of your patient.          Electronically signed by Saint Flank, DPM on 7/24/2020 at 3:17 PM.

## 2020-07-24 NOTE — PLAN OF CARE
Admit 2W with tele     Sepsis 2/2 left great toe osteomyelitis     L great toe osteo--Dr. Clint Silva likely to take to OR tonight, NPO, IV Abx per Dr. Clint Silva    DM--BG uncontrolled, ED to give insulin, monitor closely

## 2020-07-25 LAB
ANION GAP SERPL CALCULATED.3IONS-SCNC: 17 MMOL/L (ref 3–16)
BASOPHILS ABSOLUTE: 0.1 K/UL (ref 0–0.2)
BASOPHILS RELATIVE PERCENT: 0.6 %
BUN BLDV-MCNC: 11 MG/DL (ref 7–20)
CALCIUM SERPL-MCNC: 7.6 MG/DL (ref 8.3–10.6)
CHLORIDE BLD-SCNC: 97 MMOL/L (ref 99–110)
CO2: 20 MMOL/L (ref 21–32)
CREAT SERPL-MCNC: 0.7 MG/DL (ref 0.9–1.3)
EOSINOPHILS ABSOLUTE: 0.1 K/UL (ref 0–0.6)
EOSINOPHILS RELATIVE PERCENT: 0.5 %
GFR AFRICAN AMERICAN: >60
GFR NON-AFRICAN AMERICAN: >60
GLUCOSE BLD-MCNC: 231 MG/DL (ref 70–99)
GLUCOSE BLD-MCNC: 237 MG/DL (ref 70–99)
GLUCOSE BLD-MCNC: 280 MG/DL (ref 70–99)
GLUCOSE BLD-MCNC: 289 MG/DL (ref 70–99)
GLUCOSE BLD-MCNC: 297 MG/DL (ref 70–99)
GLUCOSE BLD-MCNC: 311 MG/DL (ref 70–99)
HCT VFR BLD CALC: 32.7 % (ref 40.5–52.5)
HEMOGLOBIN: 11.4 G/DL (ref 13.5–17.5)
LYMPHOCYTES ABSOLUTE: 1.7 K/UL (ref 1–5.1)
LYMPHOCYTES RELATIVE PERCENT: 16 %
MAGNESIUM: 1.9 MG/DL (ref 1.8–2.4)
MCH RBC QN AUTO: 32.6 PG (ref 26–34)
MCHC RBC AUTO-ENTMCNC: 35 G/DL (ref 31–36)
MCV RBC AUTO: 93.2 FL (ref 80–100)
MONOCYTES ABSOLUTE: 1.4 K/UL (ref 0–1.3)
MONOCYTES RELATIVE PERCENT: 13 %
NEUTROPHILS ABSOLUTE: 7.3 K/UL (ref 1.7–7.7)
NEUTROPHILS RELATIVE PERCENT: 69.9 %
PDW BLD-RTO: 12.6 % (ref 12.4–15.4)
PERFORMED ON: ABNORMAL
PLATELET # BLD: 207 K/UL (ref 135–450)
PMV BLD AUTO: 7.7 FL (ref 5–10.5)
POTASSIUM REFLEX MAGNESIUM: 3.5 MMOL/L (ref 3.5–5.1)
RBC # BLD: 3.51 M/UL (ref 4.2–5.9)
SODIUM BLD-SCNC: 134 MMOL/L (ref 136–145)
WBC # BLD: 10.5 K/UL (ref 4–11)

## 2020-07-25 PROCEDURE — 2580000003 HC RX 258: Performed by: PODIATRIST

## 2020-07-25 PROCEDURE — 83735 ASSAY OF MAGNESIUM: CPT

## 2020-07-25 PROCEDURE — 2500000003 HC RX 250 WO HCPCS: Performed by: PODIATRIST

## 2020-07-25 PROCEDURE — 36415 COLL VENOUS BLD VENIPUNCTURE: CPT

## 2020-07-25 PROCEDURE — 85025 COMPLETE CBC W/AUTO DIFF WBC: CPT

## 2020-07-25 PROCEDURE — 6370000000 HC RX 637 (ALT 250 FOR IP): Performed by: NURSE PRACTITIONER

## 2020-07-25 PROCEDURE — 80048 BASIC METABOLIC PNL TOTAL CA: CPT

## 2020-07-25 PROCEDURE — 6360000002 HC RX W HCPCS: Performed by: PODIATRIST

## 2020-07-25 PROCEDURE — 99221 1ST HOSP IP/OBS SF/LOW 40: CPT | Performed by: NURSE PRACTITIONER

## 2020-07-25 PROCEDURE — 6370000000 HC RX 637 (ALT 250 FOR IP): Performed by: INTERNAL MEDICINE

## 2020-07-25 PROCEDURE — 6370000000 HC RX 637 (ALT 250 FOR IP): Performed by: PODIATRIST

## 2020-07-25 PROCEDURE — 2580000003 HC RX 258

## 2020-07-25 PROCEDURE — 1200000000 HC SEMI PRIVATE

## 2020-07-25 RX ORDER — CETIRIZINE HYDROCHLORIDE 10 MG/1
10 TABLET ORAL DAILY
Status: DISCONTINUED | OUTPATIENT
Start: 2020-07-25 | End: 2020-07-28 | Stop reason: HOSPADM

## 2020-07-25 RX ORDER — FLUTICASONE PROPIONATE 50 MCG
1 SPRAY, SUSPENSION (ML) NASAL DAILY
Status: DISCONTINUED | OUTPATIENT
Start: 2020-07-25 | End: 2020-07-28 | Stop reason: HOSPADM

## 2020-07-25 RX ORDER — SODIUM CHLORIDE 9 MG/ML
INJECTION, SOLUTION INTRAVENOUS
Status: COMPLETED
Start: 2020-07-25 | End: 2020-07-25

## 2020-07-25 RX ORDER — INSULIN GLARGINE 100 [IU]/ML
55 INJECTION, SOLUTION SUBCUTANEOUS NIGHTLY
Status: DISCONTINUED | OUTPATIENT
Start: 2020-07-25 | End: 2020-07-28

## 2020-07-25 RX ORDER — DIPHENHYDRAMINE HCL 25 MG
25 TABLET ORAL EVERY 6 HOURS PRN
Status: DISCONTINUED | OUTPATIENT
Start: 2020-07-25 | End: 2020-07-28 | Stop reason: HOSPADM

## 2020-07-25 RX ORDER — OXYCODONE HYDROCHLORIDE AND ACETAMINOPHEN 5; 325 MG/1; MG/1
1 TABLET ORAL EVERY 4 HOURS PRN
Status: DISCONTINUED | OUTPATIENT
Start: 2020-07-25 | End: 2020-07-28 | Stop reason: HOSPADM

## 2020-07-25 RX ADMIN — GABAPENTIN 800 MG: 400 CAPSULE ORAL at 13:57

## 2020-07-25 RX ADMIN — GABAPENTIN 800 MG: 400 CAPSULE ORAL at 05:30

## 2020-07-25 RX ADMIN — ACETAMINOPHEN 650 MG: 325 TABLET ORAL at 14:59

## 2020-07-25 RX ADMIN — INSULIN LISPRO 15 UNITS: 100 INJECTION, SOLUTION INTRAVENOUS; SUBCUTANEOUS at 12:16

## 2020-07-25 RX ADMIN — LOSARTAN POTASSIUM 50 MG: 25 TABLET, FILM COATED ORAL at 09:51

## 2020-07-25 RX ADMIN — LINEZOLID 600 MG: 600 INJECTION, SOLUTION INTRAVENOUS at 13:57

## 2020-07-25 RX ADMIN — CARVEDILOL 12.5 MG: 6.25 TABLET, FILM COATED ORAL at 16:35

## 2020-07-25 RX ADMIN — TIZANIDINE 4 MG: 4 TABLET ORAL at 21:31

## 2020-07-25 RX ADMIN — LINEZOLID 600 MG: 600 INJECTION, SOLUTION INTRAVENOUS at 01:40

## 2020-07-25 RX ADMIN — INSULIN GLARGINE 55 UNITS: 100 INJECTION, SOLUTION SUBCUTANEOUS at 21:39

## 2020-07-25 RX ADMIN — CETIRIZINE HYDROCHLORIDE 10 MG: 10 TABLET ORAL at 12:15

## 2020-07-25 RX ADMIN — SODIUM CHLORIDE 250 ML: 9 INJECTION, SOLUTION INTRAVENOUS at 05:18

## 2020-07-25 RX ADMIN — ACETAMINOPHEN 650 MG: 325 TABLET ORAL at 03:32

## 2020-07-25 RX ADMIN — INSULIN LISPRO 6 UNITS: 100 INJECTION, SOLUTION INTRAVENOUS; SUBCUTANEOUS at 16:37

## 2020-07-25 RX ADMIN — CEFEPIME 2 G: 2 INJECTION, POWDER, FOR SOLUTION INTRAVENOUS at 12:15

## 2020-07-25 RX ADMIN — ATORVASTATIN CALCIUM 10 MG: 10 TABLET, FILM COATED ORAL at 09:52

## 2020-07-25 RX ADMIN — OXYCODONE HYDROCHLORIDE AND ACETAMINOPHEN 1 TABLET: 5; 325 TABLET ORAL at 16:35

## 2020-07-25 RX ADMIN — FLUTICASONE PROPIONATE 1 SPRAY: 50 SPRAY, METERED NASAL at 17:46

## 2020-07-25 RX ADMIN — Medication 10 ML: at 09:51

## 2020-07-25 RX ADMIN — PAROXETINE HYDROCHLORIDE 10 MG: 20 TABLET, FILM COATED ORAL at 09:52

## 2020-07-25 RX ADMIN — ENOXAPARIN SODIUM 40 MG: 100 INJECTION SUBCUTANEOUS at 09:52

## 2020-07-25 RX ADMIN — GABAPENTIN 800 MG: 400 CAPSULE ORAL at 21:31

## 2020-07-25 RX ADMIN — METRONIDAZOLE 500 MG: 500 INJECTION, SOLUTION INTRAVENOUS at 05:18

## 2020-07-25 RX ADMIN — METRONIDAZOLE 500 MG: 500 INJECTION, SOLUTION INTRAVENOUS at 22:35

## 2020-07-25 RX ADMIN — METRONIDAZOLE 500 MG: 500 INJECTION, SOLUTION INTRAVENOUS at 13:57

## 2020-07-25 RX ADMIN — TRAZODONE HYDROCHLORIDE 50 MG: 50 TABLET ORAL at 21:31

## 2020-07-25 RX ADMIN — Medication 10 ML: at 21:46

## 2020-07-25 RX ADMIN — INSULIN LISPRO 15 UNITS: 100 INJECTION, SOLUTION INTRAVENOUS; SUBCUTANEOUS at 09:56

## 2020-07-25 RX ADMIN — OXYCODONE HYDROCHLORIDE AND ACETAMINOPHEN 1 TABLET: 5; 325 TABLET ORAL at 21:39

## 2020-07-25 RX ADMIN — INSULIN LISPRO 15 UNITS: 100 INJECTION, SOLUTION INTRAVENOUS; SUBCUTANEOUS at 16:37

## 2020-07-25 RX ADMIN — CEFEPIME 2 G: 2 INJECTION, POWDER, FOR SOLUTION INTRAVENOUS at 05:18

## 2020-07-25 RX ADMIN — CEFEPIME 2 G: 2 INJECTION, POWDER, FOR SOLUTION INTRAVENOUS at 21:31

## 2020-07-25 RX ADMIN — CARVEDILOL 12.5 MG: 6.25 TABLET, FILM COATED ORAL at 09:51

## 2020-07-25 RX ADMIN — INSULIN LISPRO 8 UNITS: 100 INJECTION, SOLUTION INTRAVENOUS; SUBCUTANEOUS at 12:16

## 2020-07-25 ASSESSMENT — PAIN SCALES - GENERAL
PAINLEVEL_OUTOF10: 9
PAINLEVEL_OUTOF10: 6
PAINLEVEL_OUTOF10: 5
PAINLEVEL_OUTOF10: 7
PAINLEVEL_OUTOF10: 3
PAINLEVEL_OUTOF10: 0
PAINLEVEL_OUTOF10: 5

## 2020-07-25 ASSESSMENT — PAIN DESCRIPTION - LOCATION
LOCATION: FOOT

## 2020-07-25 ASSESSMENT — PAIN DESCRIPTION - PAIN TYPE
TYPE: SURGICAL PAIN

## 2020-07-25 ASSESSMENT — PAIN DESCRIPTION - ORIENTATION
ORIENTATION: LEFT

## 2020-07-25 NOTE — PLAN OF CARE
Nutrition Problem #1: Increased nutrient needs  Intervention: Food and/or Nutrient Delivery: Continue Current Diet  Nutritional Goals: PO intake >75%, improved glycemic control, wound healing, maintain adequate hydration

## 2020-07-25 NOTE — H&P
TONSILLECTOMY      WISDOM TOOTH EXTRACTION         Medications Prior to Admission:    Prior to Admission medications    Medication Sig Start Date End Date Taking? Authorizing Provider   loratadine (CLARITIN) 10 MG tablet Take 10 mg by mouth nightly   Yes Historical Provider, MD   gabapentin (NEURONTIN) 400 MG capsule TAKE 2 CAPSULES BY MOUTH THREE TIMES DAILY. 7/24/20 8/23/20 Yes Taylor Cornoa MD   simvastatin (ZOCOR) 20 MG tablet TAKE 1 TABLET BY MOUTH NIGHTLY 7/2/20  Yes Taylor Corona MD   losartan (COZAAR) 50 MG tablet TAKE 1 TABLET BY MOUTH DAILY 7/2/20  Yes Taylor Corona MD   tiZANidine (ZANAFLEX) 4 MG tablet TAKE 1 TABLET BY MOUTH NIGHTLY AS NEEDED FOR MUSCLE SPASMS 6/12/20  Yes Taylor Corona MD   traZODone (DESYREL) 50 MG tablet TAKE 1 TABLET BY MOUTH NIGHTLY 5/27/20  Yes Taylor Corona MD   carvedilol (COREG) 12.5 MG tablet TAKE 1 TABLET BY MOUTH 2 TIMES DAILY (WITH MEALS) 5/27/20  Yes Taylor Corona MD   furosemide (LASIX) 20 MG tablet Take 1 tablet by mouth daily 3/2/20  Yes Taylor Corona MD   PARoxetine (PAXIL) 10 MG tablet TAKE 1 TABLET BY MOUTH EVERY MORNING 2/21/20  Yes Taylor Corona MD   glimepiride (AMARYL) 4 MG tablet TAKE 1 TABLET BY MOUTH EVERY MORNING (BEFORE BREAKFAST) 2/21/20  Yes Taylor Corona MD   insulin glargine (TOUJEO SOLOSTAR) 300 UNIT/ML injection pen Inject 65 Units into the skin nightly  Patient taking differently: Inject 60 Units into the skin nightly  9/11/19  Yes Taylor Corona MD   insulin lispro (HUMALOG KWIKPEN) 100 UNIT/ML pen Inject 15 Units into the skin 3 times daily (before meals)  Patient taking differently: Inject into the skin 3 times daily (before meals) Patient takes per sliding scale 8/26/19  Yes Stacy Oconnor MD   ammonium lactate (LAC-HYDRIN) 12 % cream Apply topically 2x daily.  9/5/19   Bernice Felty, DPM   sildenafil (VIAGRA) 100 MG tablet TAKE 1 TABLET BY MOUTH AS NEEDED FOR ERECTILE monitor     DM2  - Uncontrolled   - BG >500 on arrival  - Check hgb A1c: pending   - Hold home oral rx   - Continue Lantus, SSI and pre-prandial humalog-->increase to medium SSI  - increased Lantus to 55 units nightly. HTN  - BP is controlled  - Continue BB, Losartan    HLD  - Continue statin     Obesity  - Body mass index is 33.72 kg/m².   - Recommended weight loss    DVT Prophylaxis: Lovenox   Diet: DIET CARB CONTROL;   Code Status: Full Code      Daphnie DAVIES-C  7/25/2020

## 2020-07-25 NOTE — PLAN OF CARE
Problem: Falls - Risk of:  Goal: Will remain free from falls  Description: Will remain free from falls  Outcome: Ongoing  Goal: Absence of physical injury  Description: Absence of physical injury  Outcome: Ongoing     Problem: Pain:  Description: Pain management should include both nonpharmacologic and pharmacologic interventions.   Goal: Pain level will decrease  Description: Pain level will decrease  Outcome: Ongoing  Goal: Control of acute pain  Description: Control of acute pain  Outcome: Ongoing  Goal: Control of chronic pain  Description: Control of chronic pain  Outcome: Ongoing  Goal: Patient's pain/discomfort is manageable  Description: Patient's pain/discomfort is manageable  Outcome: Ongoing     Problem: Nutritional:  Goal: Nutritional status will improve  Description: Nutritional status will improve  Outcome: Ongoing     Problem: Daily Care:  Goal: Daily care needs are met  Description: Daily care needs are met  Outcome: Ongoing     Problem: Safety:  Goal: Free from accidental physical injury  Description: Free from accidental physical injury  Outcome: Ongoing  Goal: Free from intentional harm  Description: Free from intentional harm  Outcome: Ongoing     Problem: Skin Integrity:  Goal: Skin integrity will stabilize  Description: Skin integrity will stabilize  Outcome: Ongoing

## 2020-07-25 NOTE — PROGRESS NOTES
Left foot with dressing intact, noted dried blood to dressing, sx shoe in place. Pt denies needs at this time.

## 2020-07-25 NOTE — PROGRESS NOTES
Comprehensive Nutrition Assessment    Type and Reason for Visit:  Initial, Positive Nutrition Screen(MST = 2, wound)    Nutrition Recommendations/Plan:   1. Continue current CCC4 diet as tolerated  2. Continue to document % Po intake in EMR. 3. Will continue to monitor PO intake, wt, pertinent labs, meds, and bowel fxn, F/U PRN. Nutrition Assessment:  Pt at nutrition risk PTA r/t uncontrolled DMT2, Lt great toe infection/osteomyletitis, Obesity Class I. Per pt appetite improving, currently with good PO intake, blood glucose improving on insulin regimen, CHO controlled diet, s/p Lt great toe ampuation 7/24, hyponatremia improving. Pt remains at risk d/t wound healing, IV abx therapy, DMT2. Diet edu r/t DMT2 provided. Nutrition education provided r/t diet and DMT2. Continue current diet and F/U PRN. Malnutrition Assessment:  Malnutrition Status: At risk for malnutrition (Comment)(wound healing)    Context:  Acute Illness     Findings of the 6 clinical characteristics of malnutrition:  Energy Intake:  1 - 75% or less of estimated energy requirements for 7 or more days  Weight Loss:  Unable to assess     Body Fat Loss:  Unable to assess   Muscle Mass Loss:  Unable to assess   Fluid Accumulation:  1 - Mild Extremities   Strength:  Not Performed    Estimated Daily Nutrient Needs:  Energy (kcal):  1738-2086kcal (15-18kcal/kg/CBW); Weight Used for Energy Requirements:  Current     Protein (g):  121-145g (1.5-1.8g/kg/IBW); Weight Used for Protein Requirements:  Ideal        Fluid (ml/day):  1738-2086ml or per MD; Weight Used for Fluid Requirements:  Current      Nutrition Related Findings:  Pt reports decreased appetite, PO intake x 1wk PTA. Denies C/S issues, denies V/D, occaisional nausea. Pt reports being mindful of CHO intake PTA, has received DMT2 edu in past. Appetite improving. Elevated POC glucose 231-311 (7/25), improving, HgbA1c pending.   BLE non-pitting edema, + surgical incision s/p POD 1 Lt great toe amputation, has walking boot. Chronic BLE, BLE neuropathy. Wounds:  Surgical Wound(Lt great toe oseomylitis, s/p amputation 7/24)       Current Nutrition Therapies:    DIET CARB CONTROL; Anthropometric Measures:  · Height: 6' (182.9 cm)  · Current Body Weight: 248 lb 9 oz (112.7 kg)   · Admission Body Weight: 255 lb (115.7 kg)    · Usual Body Weight: NA  · Ideal Body Weight: 178 lbs; % Ideal Body Weight 139.6 %   · BMI: 33.7  · BMI Categories: Obese Class 1 (BMI 30.0-34. 9)       Nutrition Diagnosis:   · Increased nutrient needs related to acute injury/trauma, endocrine dysfuntion as evidenced by wounds, lab values, other (comment)(s/p Lt great toe amputation 7/24/20)      Nutrition Interventions:   Food and/or Nutrient Delivery:  Continue Current Diet  Nutrition Education/Counseling:  Diet edu provided, continue to monitor need for addition edu  Coordination of Nutrition Care:  Continued Inpatient Monitoring, Coordination of Community Care    Goals:  PO intake >75%, improved glycemic control, wound healing, maintain adequate hydration       Nutrition Monitoring and Evaluation:   Behavioral-Environmental Outcomes:  Readiness to change  Food/Nutrient Intake Outcomes:  Food and Nutrient Intake  Physical Signs/Symptoms Outcomes:  Biochemical Data, Weight, GI Status, Other (Comment)(wound healing)     Discharge Planning:    Continue current diet     Electronically signed by Humberto Modi RD, LD on 7/25/20 at 5:16 PM EDT    Contact: [phone]

## 2020-07-25 NOTE — FLOWSHEET NOTE
07/24/20 2018   Vital Signs   Temp 100 °F (37.8 °C)   Temp Source Oral   Pulse 102   Heart Rate Source Monitor   Resp 19   /88   BP Location Right upper arm   BP Upper/Lower Upper   Patient Position High fowlers   Oxygen Therapy   SpO2 96 %   Pt admitted from PACU. Dressing to left foot intact, with small amount of contained sanguinous drainage. Chronic numbness to hands and feet, unchanged from normal post tib pulse in surgical foot +2, cap refill DEE d/t amputation and bandage, scattered abrasions with scabbed over healing areas to bilateral legs. Tylenol given for pain and fever, will contact MD for pain medication order as no other meds are available. No needs at this time. Will continue to monitor.

## 2020-07-26 PROBLEM — L97.524 DIABETIC ULCER OF TOE OF LEFT FOOT ASSOCIATED WITH TYPE 2 DIABETES MELLITUS, WITH NECROSIS OF BONE (HCC): Status: ACTIVE | Noted: 2019-08-01

## 2020-07-26 PROBLEM — J30.9 ALLERGIC RHINITIS: Status: ACTIVE | Noted: 2020-07-26

## 2020-07-26 PROBLEM — L03.116 CELLULITIS OF LEFT FOOT: Status: ACTIVE | Noted: 2020-07-26

## 2020-07-26 PROBLEM — L08.9 DIABETIC FOOT INFECTION (HCC): Status: RESOLVED | Noted: 2019-08-22 | Resolved: 2020-07-26

## 2020-07-26 PROBLEM — E11.628 DIABETIC FOOT INFECTION (HCC): Status: RESOLVED | Noted: 2019-08-22 | Resolved: 2020-07-26

## 2020-07-26 LAB
ESTIMATED AVERAGE GLUCOSE: 289.1 MG/DL
GLUCOSE BLD-MCNC: 228 MG/DL (ref 70–99)
GLUCOSE BLD-MCNC: 237 MG/DL (ref 70–99)
GLUCOSE BLD-MCNC: 246 MG/DL (ref 70–99)
GLUCOSE BLD-MCNC: 277 MG/DL (ref 70–99)
GLUCOSE BLD-MCNC: 339 MG/DL (ref 70–99)
HBA1C MFR BLD: 11.7 %
PERFORMED ON: ABNORMAL

## 2020-07-26 PROCEDURE — 6360000002 HC RX W HCPCS: Performed by: PODIATRIST

## 2020-07-26 PROCEDURE — 1200000000 HC SEMI PRIVATE

## 2020-07-26 PROCEDURE — 99232 SBSQ HOSP IP/OBS MODERATE 35: CPT | Performed by: INTERNAL MEDICINE

## 2020-07-26 PROCEDURE — 6370000000 HC RX 637 (ALT 250 FOR IP): Performed by: NURSE PRACTITIONER

## 2020-07-26 PROCEDURE — 6370000000 HC RX 637 (ALT 250 FOR IP): Performed by: PHYSICIAN ASSISTANT

## 2020-07-26 PROCEDURE — 2580000003 HC RX 258: Performed by: PODIATRIST

## 2020-07-26 PROCEDURE — 6370000000 HC RX 637 (ALT 250 FOR IP): Performed by: PODIATRIST

## 2020-07-26 PROCEDURE — 6370000000 HC RX 637 (ALT 250 FOR IP): Performed by: INTERNAL MEDICINE

## 2020-07-26 PROCEDURE — 2500000003 HC RX 250 WO HCPCS: Performed by: PODIATRIST

## 2020-07-26 RX ADMIN — CEFEPIME 2 G: 2 INJECTION, POWDER, FOR SOLUTION INTRAVENOUS at 22:04

## 2020-07-26 RX ADMIN — INSULIN LISPRO 4 UNITS: 100 INJECTION, SOLUTION INTRAVENOUS; SUBCUTANEOUS at 11:43

## 2020-07-26 RX ADMIN — FLUTICASONE PROPIONATE 1 SPRAY: 50 SPRAY, METERED NASAL at 09:00

## 2020-07-26 RX ADMIN — METRONIDAZOLE 500 MG: 500 INJECTION, SOLUTION INTRAVENOUS at 22:52

## 2020-07-26 RX ADMIN — GABAPENTIN 800 MG: 400 CAPSULE ORAL at 14:16

## 2020-07-26 RX ADMIN — INSULIN LISPRO 15 UNITS: 100 INJECTION, SOLUTION INTRAVENOUS; SUBCUTANEOUS at 11:43

## 2020-07-26 RX ADMIN — GABAPENTIN 800 MG: 400 CAPSULE ORAL at 22:04

## 2020-07-26 RX ADMIN — TIZANIDINE 4 MG: 4 TABLET ORAL at 22:04

## 2020-07-26 RX ADMIN — OXYCODONE HYDROCHLORIDE AND ACETAMINOPHEN 1 TABLET: 5; 325 TABLET ORAL at 16:22

## 2020-07-26 RX ADMIN — ATORVASTATIN CALCIUM 10 MG: 10 TABLET, FILM COATED ORAL at 08:59

## 2020-07-26 RX ADMIN — Medication 10 ML: at 22:05

## 2020-07-26 RX ADMIN — METRONIDAZOLE 500 MG: 500 INJECTION, SOLUTION INTRAVENOUS at 06:17

## 2020-07-26 RX ADMIN — ENOXAPARIN SODIUM 40 MG: 100 INJECTION SUBCUTANEOUS at 08:59

## 2020-07-26 RX ADMIN — OXYCODONE HYDROCHLORIDE AND ACETAMINOPHEN 1 TABLET: 5; 325 TABLET ORAL at 09:00

## 2020-07-26 RX ADMIN — INSULIN LISPRO 15 UNITS: 100 INJECTION, SOLUTION INTRAVENOUS; SUBCUTANEOUS at 17:13

## 2020-07-26 RX ADMIN — GABAPENTIN 800 MG: 400 CAPSULE ORAL at 08:59

## 2020-07-26 RX ADMIN — INSULIN LISPRO 4 UNITS: 100 INJECTION, SOLUTION INTRAVENOUS; SUBCUTANEOUS at 17:13

## 2020-07-26 RX ADMIN — LOSARTAN POTASSIUM 50 MG: 25 TABLET, FILM COATED ORAL at 08:59

## 2020-07-26 RX ADMIN — ACETAMINOPHEN 650 MG: 325 TABLET ORAL at 02:18

## 2020-07-26 RX ADMIN — INSULIN LISPRO 6 UNITS: 100 INJECTION, SOLUTION INTRAVENOUS; SUBCUTANEOUS at 09:04

## 2020-07-26 RX ADMIN — CEFEPIME 2 G: 2 INJECTION, POWDER, FOR SOLUTION INTRAVENOUS at 05:23

## 2020-07-26 RX ADMIN — TRAZODONE HYDROCHLORIDE 50 MG: 50 TABLET ORAL at 22:04

## 2020-07-26 RX ADMIN — CEFEPIME 2 G: 2 INJECTION, POWDER, FOR SOLUTION INTRAVENOUS at 11:39

## 2020-07-26 RX ADMIN — ONDANSETRON HYDROCHLORIDE 4 MG: 2 INJECTION, SOLUTION INTRAMUSCULAR; INTRAVENOUS at 16:21

## 2020-07-26 RX ADMIN — PAROXETINE HYDROCHLORIDE 10 MG: 20 TABLET, FILM COATED ORAL at 09:00

## 2020-07-26 RX ADMIN — CARVEDILOL 12.5 MG: 6.25 TABLET, FILM COATED ORAL at 16:18

## 2020-07-26 RX ADMIN — INSULIN GLARGINE 55 UNITS: 100 INJECTION, SOLUTION SUBCUTANEOUS at 22:06

## 2020-07-26 RX ADMIN — INSULIN LISPRO 15 UNITS: 100 INJECTION, SOLUTION INTRAVENOUS; SUBCUTANEOUS at 09:04

## 2020-07-26 RX ADMIN — Medication 10 ML: at 08:59

## 2020-07-26 RX ADMIN — LINEZOLID 600 MG: 600 INJECTION, SOLUTION INTRAVENOUS at 14:17

## 2020-07-26 RX ADMIN — LINEZOLID 600 MG: 600 INJECTION, SOLUTION INTRAVENOUS at 02:12

## 2020-07-26 RX ADMIN — CARVEDILOL 12.5 MG: 6.25 TABLET, FILM COATED ORAL at 08:59

## 2020-07-26 RX ADMIN — CETIRIZINE HYDROCHLORIDE 10 MG: 10 TABLET ORAL at 08:59

## 2020-07-26 RX ADMIN — METRONIDAZOLE 500 MG: 500 INJECTION, SOLUTION INTRAVENOUS at 14:16

## 2020-07-26 ASSESSMENT — PAIN DESCRIPTION - PAIN TYPE
TYPE: SURGICAL PAIN
TYPE: SURGICAL PAIN

## 2020-07-26 ASSESSMENT — PAIN SCALES - GENERAL
PAINLEVEL_OUTOF10: 6
PAINLEVEL_OUTOF10: 7
PAINLEVEL_OUTOF10: 4
PAINLEVEL_OUTOF10: 4
PAINLEVEL_OUTOF10: 0

## 2020-07-26 ASSESSMENT — PAIN DESCRIPTION - LOCATION: LOCATION: FOOT

## 2020-07-26 NOTE — CARE COORDINATION
Case Management Assessment  Initial Evaluation    Date/Time of Evaluation: 7/26/2020 2:07 PM  Assessment Completed by: Yola Ryder    Patient Name: Florecita Garcia  YOB: 1975  Diagnosis: Toe osteomyelitis, left ([address] [M86.9]  Date / Time: 7/24/2020 12:07 PM  Admission status/Date: 07/24/20  Chart Reviewed: Yes      Patient Interviewed: Yes and No   Family Interviewed:  Yes - spouse Crystal Smalls at bedside. Hospitalization in the last 30 days:  No    Contacts  :   Destinee Fernandez  Relationship to Patient: spouse   Phone Number:  [phone]  Alternate Contact:     Relationship to Patient:     Phone Number:    Met with: Pt and spouse via telephone assessment. Current PCP: Dr. Emerita Pryor required for SNF : Y        3 night stay required -  [employer]  Support Systems: Spouse/Significant Other, Family Members  Transportation: self    Meal Preparation: self    Housing  Home Environment: ranch with loft  Steps: Independent  Plans to Return to Present Housing: Yes  Other Identified Issues: none    Home Care Information  Currently active with Home Health Care : No  Type of Home Care Services: None  Passport/Waiver : No  :                      Phone Number:    Passport/Waiver Services: n/a          Durable Medical Equipment   DME Provider: none  Equipment: none    Has Home O2 in place on admit:  No    If above answer is No ---is pt presently on O2 during hospitalization:  No  if yes CM to follow for potential DC O2 need  Informed of need to bring portable home O2 tank on day of discharge for nursing to connect prior to leaving:   Not Indicated  Verbalized agreement/Understanding:   Not Indicated    Community Service Affiliation  Dialysis:  No    · Name:  · Location  · Dialysis Schedule:  · Phone:   · Fax:     Outpatient PT/OT: No    Cancer Center: No     CHF Clinic: No     Pulmonary Rehab: No  Pain Clinic: No  Community Mental Health: No    Wound Clinic: No     COVID SCREENING: No       Other: n/a    The Plan for Transition of Care is related to the following treatment goals: home    The Patient and/or patient representative  was provided with a choice of provider and agrees   with the discharge plan. [x] Yes [] No    DISCHARGE PLAN: home with spouse. Watch for poss. IV abx. Pt has had IV abx in the past and used [facility] and [facility]. Per Spouse, they would like to use them again if needed. Wife is teachable. Will follow. Cx's pending. Explained Case Management role/services.

## 2020-07-26 NOTE — FLOWSHEET NOTE
07/26/20 0845   Vital Signs   Temp 98.9 °F (37.2 °C)   Temp Source Oral   Pulse 102   Heart Rate Source Monitor   Resp 18   BP (!) 152/93   BP Location Left upper arm   BP Upper/Lower Upper   Patient Position Semi fowlers   Level of Consciousness 0   MEWS Score 2   Oxygen Therapy   SpO2 98 %   O2 Device None (Room air)   Pt resting in bed, c/o pain , gave prn pain meds per order. AM assessment complete. Call light in reach.

## 2020-07-26 NOTE — PROGRESS NOTES
Progress Note    Admit Date:  7/24/2020      Admitted with left great toe osteomyelitis and sepsis. S/P partial left foot amputation. POD #2  Subjective:  Mr. Rudolph Haddad is stable. Left foot in dressing. Pain controlled. + low grade fevers. + nausea. BG improving     Objective:   BP (!) 152/93   Pulse 102   Temp 98.9 °F (37.2 °C) (Oral)   Resp 18   Ht 6' (1.829 m)   Wt 248 lb 9.6 oz (112.8 kg)   SpO2 98%   BMI 33.72 kg/m²       Intake/Output Summary (Last 24 hours) at 7/26/2020 1504  Last data filed at 7/26/2020 1222  Gross per 24 hour   Intake 960 ml   Output 1900 ml   Net -940 ml         Physical Exam:  General:  Awake, alert, NAD  Skin:  Warm and dry  Neck:  JVD absent. Neck supple  Chest:  Clear to auscultation, respiration easy. No wheezes, rales or rhonchi. Cardiovascular:  RRR ,S1S2 normal  Abdomen:  Soft, non tender, non distended, BS +  Extremities:  No edema. Left foot in dressing. Intact peripheral pulses.  Brisk cap refill, < 2 secs  Neuro: non focal      Medications:   Scheduled Meds:   insulin lispro  0-12 Units Subcutaneous TID WC    insulin lispro  0-6 Units Subcutaneous Nightly    cetirizine  10 mg Oral Daily    fluticasone  1 spray Each Nostril Daily    insulin glargine  55 Units Subcutaneous Nightly    linezolid  600 mg Intravenous Q12H    carvedilol  12.5 mg Oral BID WC    gabapentin  800 mg Oral TID    insulin lispro  15 Units Subcutaneous TID AC    losartan  50 mg Oral Daily    PARoxetine  10 mg Oral QAM    atorvastatin  10 mg Oral Daily    tiZANidine  4 mg Oral Nightly    traZODone  50 mg Oral Nightly    metroNIDAZOLE  500 mg Intravenous Q8H    cefepime  2 g Intravenous Q8H    sodium chloride flush  10 mL Intravenous 2 times per day    enoxaparin  40 mg Subcutaneous Daily       Continuous Infusions:   dextrose         Data:  CBC:   Recent Labs     07/24/20  1240 07/25/20  0530   WBC 13.0* 10.5   RBC 4.32 3.51*   HGB 13.8 11.4*   HCT 40.1* 32.7*   MCV 92.8 93.2   RDW 12.2* 12.6    207     BMP:   Recent Labs     07/24/20  1240 07/25/20  0530   * 134*   K 4.3 3.5   CL 90* 97*   CO2 25 20*   BUN 17 11   CREATININE 0.9 0.7*     BNP: No results for input(s): BNP in the last 72 hours. PT/INR: No results for input(s): PROTIME, INR in the last 72 hours. APTT: No results for input(s): APTT in the last 72 hours. CARDIAC ENZYMES: No results for input(s): CKMB, CKMBINDEX, TROPONINI in the last 72 hours. Invalid input(s): CKTOTAL;3  FASTING LIPID PANEL:  Lab Results   Component Value Date    CHOL 296 (H) 12/21/2018    HDL 25 (L) 03/02/2020    TRIG 1,289 (H) 12/21/2018     LIVER PROFILE:   Recent Labs     07/24/20  1240   AST 28   ALT 22   BILITOT 1.2*   ALKPHOS 281*        Radiology  XR TOE LEFT (MIN 2 VIEWS)   Final Result   Suspect osteomyelitis medial base of the 1st distal phalanx with moderate   soft tissue edema and soft tissue emphysema. Assessment:  Active Problems:    Diabetes mellitus (HCC)    Toe osteomyelitis, left (HCC)    Gangrene of foot ([address]    Hyperglycemia    Septicemia ([address]  Resolved Problems:    * No resolved hospital problems. *      Plan:    Sepsis (POA- leukocytosis, febrile, tachycardia, LA, source)   - 2/2 left toe/foot osteomyelitis   - cont  IV Abx as below  - Blood Cx: NGTD. wound CX growing strep   - BP stable, no need for pressors   - sepsis improving.      Osteomyelitis of L great toe   - Continue Cefepime, Zyvox and Flagyl based on patients allergies, D#3  - Consulted podiatry   - S/p partial L foot amputation on 7/24 with Dr. Loraine Chin   - POD#2   - Continue elevation, wound care  - persistent low grade fevers      Hyponatremia  - 2/2 hyperglycemia  - Continue to control BG levels and monitor      DM2  - Uncontrolled   - BG >500 on arrival  - Check hgb A1c: pending   - Hold home oral rx   - Continue Lantus, SSI and pre-prandial humalog-->increased  to medium SSI  - increased Lantus to 55 units nightly.    BG improving now     HTN  - BP is controlled  - Continue BB, Losartan     HLD  - Continue statin      Obesity  - Body mass index is 33.72 kg/m².   - Recommended weight loss     DVT Prophylaxis: Lovenox   Diet: DIET CARB CONTROL;   Code Status: Full Code    Karen Romero MD 7/26/2020 3:04 PM

## 2020-07-26 NOTE — PROGRESS NOTES
Shift assessment complete. Pt is alert and oriented. Temperature elevated to 100.5. Respirations are even and easy. No complaints voiced. Pt denies needs at this time. SR up x 2 and bed in low position. Call light is within reach.

## 2020-07-26 NOTE — PLAN OF CARE
Problem: Falls - Risk of:  Goal: Will remain free from falls  Description: Will remain free from falls  7/26/2020 0941 by Rodger Gould RN  Outcome: Ongoing  7/25/2020 2330 by Kinsey Garcia RN  Outcome: Ongoing     Problem: Pain:  Goal: Pain level will decrease  Description: Pain level will decrease  7/26/2020 0941 by Rodger Gould RN  Outcome: Ongoing  7/25/2020 2330 by Kinsey Garcia RN  Outcome: Ongoing     Problem: Pain:  Goal: Control of acute pain  Description: Control of acute pain  7/25/2020 2330 by Kinsey Garcia RN  Outcome: Ongoing     Problem: Skin Integrity:  Goal: Demonstration of wound healing without infection will improve  Description: Demonstration of wound healing without infection will improve  7/25/2020 2330 by Kinsey Garcia RN  Outcome: Ongoing     Problem: Skin Integrity:  Goal: Demonstration of wound healing without infection will improve  Description: Demonstration of wound healing without infection will improve  7/25/2020 2330 by Kinsey Garcia RN  Outcome: Ongoing     Problem: Skin Integrity:  Goal: Will show no infection signs and symptoms  Description: Will show no infection signs and symptoms  7/25/2020 2330 by Kinsey Garcia RN  Outcome: Ongoing

## 2020-07-27 LAB
GLUCOSE BLD-MCNC: 223 MG/DL (ref 70–99)
GLUCOSE BLD-MCNC: 239 MG/DL (ref 70–99)
GLUCOSE BLD-MCNC: 247 MG/DL (ref 70–99)
GLUCOSE BLD-MCNC: 258 MG/DL (ref 70–99)
GLUCOSE BLD-MCNC: 280 MG/DL (ref 70–99)
PERFORMED ON: ABNORMAL

## 2020-07-27 PROCEDURE — 2580000003 HC RX 258: Performed by: INTERNAL MEDICINE

## 2020-07-27 PROCEDURE — 2500000003 HC RX 250 WO HCPCS: Performed by: PODIATRIST

## 2020-07-27 PROCEDURE — 2580000003 HC RX 258: Performed by: PODIATRIST

## 2020-07-27 PROCEDURE — 99232 SBSQ HOSP IP/OBS MODERATE 35: CPT | Performed by: INTERNAL MEDICINE

## 2020-07-27 PROCEDURE — 6360000002 HC RX W HCPCS: Performed by: PODIATRIST

## 2020-07-27 PROCEDURE — 1200000000 HC SEMI PRIVATE

## 2020-07-27 PROCEDURE — 6370000000 HC RX 637 (ALT 250 FOR IP): Performed by: NURSE PRACTITIONER

## 2020-07-27 PROCEDURE — 6360000002 HC RX W HCPCS: Performed by: INTERNAL MEDICINE

## 2020-07-27 PROCEDURE — 6370000000 HC RX 637 (ALT 250 FOR IP): Performed by: PODIATRIST

## 2020-07-27 PROCEDURE — 6370000000 HC RX 637 (ALT 250 FOR IP): Performed by: INTERNAL MEDICINE

## 2020-07-27 RX ORDER — TRAZODONE HYDROCHLORIDE 50 MG/1
50 TABLET ORAL NIGHTLY PRN
Status: DISCONTINUED | OUTPATIENT
Start: 2020-07-27 | End: 2020-07-28 | Stop reason: HOSPADM

## 2020-07-27 RX ADMIN — GABAPENTIN 800 MG: 400 CAPSULE ORAL at 13:22

## 2020-07-27 RX ADMIN — PAROXETINE HYDROCHLORIDE 10 MG: 20 TABLET, FILM COATED ORAL at 09:46

## 2020-07-27 RX ADMIN — AMPICILLIN SODIUM AND SULBACTAM SODIUM 3 G: 2; 1 INJECTION, POWDER, FOR SOLUTION INTRAMUSCULAR; INTRAVENOUS at 18:46

## 2020-07-27 RX ADMIN — CETIRIZINE HYDROCHLORIDE 10 MG: 10 TABLET ORAL at 09:47

## 2020-07-27 RX ADMIN — LINEZOLID 600 MG: 600 INJECTION, SOLUTION INTRAVENOUS at 02:10

## 2020-07-27 RX ADMIN — TIZANIDINE 4 MG: 4 TABLET ORAL at 21:02

## 2020-07-27 RX ADMIN — ENOXAPARIN SODIUM 40 MG: 100 INJECTION SUBCUTANEOUS at 09:49

## 2020-07-27 RX ADMIN — GABAPENTIN 800 MG: 400 CAPSULE ORAL at 09:47

## 2020-07-27 RX ADMIN — INSULIN GLARGINE 55 UNITS: 100 INJECTION, SOLUTION SUBCUTANEOUS at 21:03

## 2020-07-27 RX ADMIN — CEFEPIME 2 G: 2 INJECTION, POWDER, FOR SOLUTION INTRAVENOUS at 05:22

## 2020-07-27 RX ADMIN — AMPICILLIN SODIUM AND SULBACTAM SODIUM 3 G: 2; 1 INJECTION, POWDER, FOR SOLUTION INTRAMUSCULAR; INTRAVENOUS at 23:53

## 2020-07-27 RX ADMIN — GABAPENTIN 800 MG: 400 CAPSULE ORAL at 21:02

## 2020-07-27 RX ADMIN — INSULIN LISPRO 4 UNITS: 100 INJECTION, SOLUTION INTRAVENOUS; SUBCUTANEOUS at 17:34

## 2020-07-27 RX ADMIN — AMPICILLIN SODIUM AND SULBACTAM SODIUM 3 G: 2; 1 INJECTION, POWDER, FOR SOLUTION INTRAMUSCULAR; INTRAVENOUS at 09:56

## 2020-07-27 RX ADMIN — CARVEDILOL 12.5 MG: 6.25 TABLET, FILM COATED ORAL at 09:47

## 2020-07-27 RX ADMIN — LOSARTAN POTASSIUM 50 MG: 25 TABLET, FILM COATED ORAL at 09:46

## 2020-07-27 RX ADMIN — METRONIDAZOLE 500 MG: 500 INJECTION, SOLUTION INTRAVENOUS at 05:59

## 2020-07-27 RX ADMIN — INSULIN LISPRO 15 UNITS: 100 INJECTION, SOLUTION INTRAVENOUS; SUBCUTANEOUS at 17:34

## 2020-07-27 RX ADMIN — TRAZODONE HYDROCHLORIDE 50 MG: 50 TABLET ORAL at 21:02

## 2020-07-27 RX ADMIN — INSULIN LISPRO 15 UNITS: 100 INJECTION, SOLUTION INTRAVENOUS; SUBCUTANEOUS at 12:33

## 2020-07-27 RX ADMIN — INSULIN LISPRO 4 UNITS: 100 INJECTION, SOLUTION INTRAVENOUS; SUBCUTANEOUS at 08:24

## 2020-07-27 RX ADMIN — INSULIN LISPRO 15 UNITS: 100 INJECTION, SOLUTION INTRAVENOUS; SUBCUTANEOUS at 08:25

## 2020-07-27 RX ADMIN — ONDANSETRON HYDROCHLORIDE 4 MG: 2 INJECTION, SOLUTION INTRAMUSCULAR; INTRAVENOUS at 08:20

## 2020-07-27 RX ADMIN — ATORVASTATIN CALCIUM 10 MG: 10 TABLET, FILM COATED ORAL at 09:46

## 2020-07-27 RX ADMIN — INSULIN LISPRO 6 UNITS: 100 INJECTION, SOLUTION INTRAVENOUS; SUBCUTANEOUS at 12:34

## 2020-07-27 RX ADMIN — CARVEDILOL 12.5 MG: 6.25 TABLET, FILM COATED ORAL at 17:29

## 2020-07-27 RX ADMIN — FLUTICASONE PROPIONATE 1 SPRAY: 50 SPRAY, METERED NASAL at 09:46

## 2020-07-27 NOTE — PROGRESS NOTES
PROGRESS NOTE    Admit Date:  7/24/2020    Subjective:  45 y.o. male who is seen for evaluation of cellulitis left foot and S/P partial left foot amputation on 7/24. States doesn't feel well this AM. States woke up with some dizziness and nausea. States foot feels OK. Past Medical History:        Diagnosis Date    Acute osteomyelitis of right hallux ([address] 08/2019    CHF (congestive heart failure) (HCC)     Clostridium difficile infection 11/01/2016    Diabetic ulcer of right great toe associated with type 2 diabetes mellitus, with necrosis of bone ([address] 08/2019    Diet-controlled type 2 diabetes mellitus (HCC)     Tear of medial meniscus of left knee     Tobacco use        Past Surgical History:        Procedure Laterality Date    KNEE ARTHROSCOPY Left [id number]    LEFT KNEE ARTHROSCOPY , SYNOVECTOMY       OTHER SURGICAL HISTORY Left 07/24/2020    PARTIAL LEFT FOOT AMPUTATION    TOE AMPUTATION Right 8/23/2019    PARTIAL RIGHT FOOT AMPUTATION performed by Bj David DPM at [address] [place] [address]         Current Medications:     insulin lispro  0-12 Units Subcutaneous TID WC    insulin lispro  0-6 Units Subcutaneous Nightly    cetirizine  10 mg Oral Daily    fluticasone  1 spray Each Nostril Daily    insulin glargine  55 Units Subcutaneous Nightly    linezolid  600 mg Intravenous Q12H    carvedilol  12.5 mg Oral BID WC    gabapentin  800 mg Oral TID    insulin lispro  15 Units Subcutaneous TID AC    losartan  50 mg Oral Daily    PARoxetine  10 mg Oral QAM    atorvastatin  10 mg Oral Daily    tiZANidine  4 mg Oral Nightly    cefepime  2 g Intravenous Q8H    sodium chloride flush  10 mL Intravenous 2 times per day    enoxaparin  40 mg Subcutaneous Daily       Allergies:  Januvia [sitagliptin];  Metformin and related; Vancomycin; and Mustard oil [allyl isothiocyanate]    Social History:    Social History     Tobacco Use    Smoking status: Former Smoker agalactiae (Beta Strep Group B)Abnormal Culture Surgical -- Heavy growth   Susceptibility testing of penicillin and other beta lactams is   not necessary for beta hemolytic Streptococci since resistant   strains have not been identified. (CLSI M100)   Organism Streptococcus constellatusAbnormal Culture Surgical -- Moderate growth   No further workup         Physical Exam:    General Appearance: alert and oriented to person, place and time, well developed and well- nourished, in no acute distress  Skin: warm and dry, no rash or erythema  Head: normocephalic and atraumatic  Eyes: pupils equal, round, and reactive to light, extraocular eye movements intact, conjunctivae normal  ENT: tympanic membrane, external ear and ear canal normal bilaterally, nose without deformity, nasal mucosa and turbinates normal without polyps  Neck: supple and non-tender without mass, no thyromegaly or thyroid nodules, no cervical lymphadenopathy  Pulmonary/Chest: clear to auscultation bilaterally- no wheezes, rales or rhonchi, normal air movement, no respiratory distress  Cardiovascular: normal rate, regular rhythm, normal S1 and S2, no murmurs, rubs, clicks, or gallops, distal pulses intact, no carotid bruits  Abdomen: soft, non-tender, non-distended, normal bowel sounds, no masses or organomegaly    Dressing clean, dry intact left foot. Left hallux amputation site - minimal discolored tissue in wound bed. + dried heme in wound bed. Exposed healthy bone of the 1st MH. Moderate serous drainage noted. Mild dusky appearance of skin edges as expected. Minimal periwound erythema and edema noted. No increase in skin temperature. No malodor noted.          Assessment:  Patient Active Problem List   Diagnosis Code    Hypertension I10    Uncontrolled type 2 diabetes mellitus with diabetic polyneuropathy, with long-term current use of insulin ([facility]) E11.42, Z79.4, E11.65    Cardiomyopathy ([facility] I42.9    Mixed hyperlipidemia E78.2    Diabetic ulcer of toe of left foot associated with type 2 diabetes mellitus, with necrosis of bone ([facility]) E11.621, L97.524    Non compliance w medication regimen Z91.14    Toe osteomyelitis, left ([facility]) M86.9    Gangrene of foot ([address] I96    Sepsis due to group B Streptococcus ([facility]) A40.1    Cellulitis of left foot L03. 116    Allergic rhinitis J30.9    Arthritis M19.90    Osteoarthritis M19.90     Cellulitis left foot - improving  Gas gangrene left foot - S/P hallux amputation 7/24/20  diabetic foot ulcer left hallux secondary to peripheral neuropathy  diabetes mellitus uncontrolled        Plan  Patient examined. Reviewed labs. Continue IV antibiotics as per Dr. Dara Hoang. Dressing removed. [employer] and dry dressing. Dr. Dara Hoang adjusting antibiotics today. Hopefully that will decrease his nausea. Discussed repeat surgery vs local wound care. If local wound care will work we can salvage his 1st MH. If local wound care does not work can then proceed with repeat surgery. Patient agrees with plan. Control glucose levels to prevent future complications. Will follow.          Electronically signed by Brenda Leung DPM on 7/27/2020 at 8:00 AM.

## 2020-07-27 NOTE — PROGRESS NOTES
RBC 4.32 3.51*   HGB 13.8 11.4*   HCT 40.1* 32.7*   MCV 92.8 93.2   RDW 12.2* 12.6    207     BMP:   Recent Labs     07/24/20  1240 07/25/20  0530   * 134*   K 4.3 3.5   CL 90* 97*   CO2 25 20*   BUN 17 11   CREATININE 0.9 0.7*     LIVER PROFILE:   Recent Labs     07/24/20  1240   AST 28   ALT 22   BILITOT 1.2*   ALKPHOS 281*        Radiology  XR TOE LEFT (MIN 2 VIEWS)   Final Result   Suspect osteomyelitis medial base of the 1st distal phalanx with moderate   soft tissue edema and soft tissue emphysema. Wound c x - strep species, GNR     Assessment:    Active Problems:    Uncontrolled type 2 diabetes mellitus with diabetic polyneuropathy, with long-term current use of insulin ([facility])    Diabetic ulcer of toe of left foot associated with type 2 diabetes mellitus, with necrosis of bone ([facility])    Toe osteomyelitis, left ([facility])    Gangrene of foot ([facility])    Sepsis due to group B Streptococcus ([facility])    Cellulitis of left foot    Arthritis    Osteoarthritis  Resolved Problems:    Hyperglycemia      Plan:    Sepsis (POA- leukocytosis, febrile, tachycardia, LA, source)   - 2/2 left toe/foot osteomyelitis   - cont  IV Abx as below  - Blood Cx: NGTD. wound CX growing strep   - BP stable, no need for pressors   - sepsis improved     Osteomyelitis of L great toe   - sec to uncontrolled DM  - treated with Cefepime, Zyvox and Flagyl based on patients allergies, D#4  - Consulted podiatry   - S/p partial L foot amputation on 7/24 with Dr. Mimi Davis   - wound cx wit strep species  - POD#3  - Continue elevation, wound care  - fevers resolved. Pain controlled.  Dc planning       Hyponatremia  - 2/2 hyperglycemia  - improved with glucose correction      DM2  - Uncontrolled with complications of neuropathy, wounds - a1c at 11  - BG >500 on arrival  - Continue Lantus, SSI and pre-prandial humalog-->increased  to medium SSI  - increased Lantus to 55 units nightly.    BG improving now     HTN  - BP is controlled  - Continue BB, Losartan     HLD  - Continue statin      Obesity  - Body mass index is 33.72 kg/m².   - Recommended weight loss     DVT Prophylaxis: Lovenox   Diet: DIET CARB CONTROL;   Code Status: Full Code    Ana Encarnacion MD 7/27/2020 7:58 AM

## 2020-07-27 NOTE — PROGRESS NOTES
Pt resting in bed, requested IV pole be moved to other side of bed and sugar free pudding cup. Bed locked in lowest position with 2/4 rails up. Call light in reach.

## 2020-07-27 NOTE — PLAN OF CARE
Care plan ongoing    Problem: Falls - Risk of:  Goal: Will remain free from falls  Description: Will remain free from falls  Outcome: Ongoing     Problem: Pain:  Goal: Patient's pain/discomfort is manageable  Description: Patient's pain/discomfort is manageable  Outcome: Ongoing     Problem: Physical Regulation:  Goal: Will remain free from infection  Description: Will remain free from infection  Outcome: Ongoing

## 2020-07-27 NOTE — PROGRESS NOTES
[facility] (ID) Progress Note    Katie Walker     : 1975    DATE OF VISIT:  2020    Subjective:     Katie Walker is a 45 y.o. male who has a diabetic and open surgical ulcer located on the foot (left , medial, forefoot). Current complaint of pain in this ulcer? yes. Quality of pain: aching and burning  Timing: intermittent and stable  Severity: mild  Associated Signs/Symptoms: swelling, drainage (heavy, cloudy to serosanguinous), numbness and less redness  Other significant symptoms or pertinent ulcer history: this morning started not to feel well, with some dizziness, nausea, malaise, excess salivation; thought he was going to vomit, but has not; thinks it could be related to his Abx therapy. Low-grade fever over the weekend, no rigors. No sore throat or mouth, no IV discomfort, no pruritus or rash, no diarrhea. Had open amputation of the left hallux Friday evening; met head was exposed, but not clearly infected or necrotic at the time. Additional ulcer(s) noted? no.      Mr. Prashant Lacey has a past medical history of Acute osteomyelitis of right hallux ([address] CHF (congestive heart failure) ([address] Clostridium difficile infection, Diabetic ulcer of right great toe associated with type 2 diabetes mellitus, with necrosis of bone ([address] Diet-controlled type 2 diabetes mellitus ([address] Tear of medial meniscus of left knee, and Tobacco use. He has a past surgical history that includes Tonsillectomy; [place] tooth extraction; Knee arthroscopy (Left, [id number]); Toe amputation (Right, 2019); and other surgical history (Left, 2020). His family history includes Diabetes in his father and mother; High Blood Pressure in his father, maternal grandmother, maternal uncle, mother, and sister; High Cholesterol in his father, maternal uncle, and mother; Stroke in his father. Mr. Prashant Lacey reports that he quit smoking about 14 years ago.  He has a 1.00 pack-year smoking history. He quit smokeless tobacco use about 14 years ago. He reports current alcohol use. He reports that he does not use drugs. His current home medication list consists of Insulin Syringe-Needle U-100, Lancets, PARoxetine, ammonium lactate, blood glucose test strips, carvedilol, furosemide, gabapentin, glimepiride, insulin glargine, insulin lispro, loratadine, losartan, sildenafil, simvastatin, tiZANidine, and traZODone. Prescribed linezolid, cefepime and Flagyl starting Friday night, Flagyl stopped over the weekend. Allergies: Januvia [sitagliptin]; Metformin and related; Vancomycin; and Mustard oil [allyl isothiocyanate]    Pertinent items from the review of systems are discussed in the HPI; the remainder of the ROS was reviewed and is negative.      Objective:     Vitals:    07/26/20 1900 07/26/20 2145 07/27/20 0200 07/27/20 0800   BP: 130/77 137/88 (!) 162/98 (!) 150/93   Pulse: 86 84 88 94   Resp: 20 18 18 18   Temp: 98.6 °F (37 °C) 98.1 °F (36.7 °C) 99.2 °F (37.3 °C) 98.9 °F (37.2 °C)   TempSrc: Oral Oral Oral Oral   SpO2: 94% 93% 94% 95%   Weight:       Height:           Constitutional:  well-developed, well-nourished, overweight, looks a bit uncomfortable but not toxic  Psychiatric:  oriented to person, place and time; mood and affect appropriate for the situation   Eyes:  pupils equal, round and reactive to light; sclerae anicteric, conjunctivae not pale  ENT: no thrush or oral ulcers, mucous membranes moist  Abd: soft, NT, ND, good BS  Cardiovascular:  bilateral pedal pulses palpable, foot largely warm, good cap refill; mild lower extremity edema  Lymphatic:  no inguinal or popliteal adenopathy, no angitis, fading and receding cellulitis of the left forefoot and midfoot  Musculoskeletal:  no clubbing, cyanosis or petechiae; RLE and LLE with no gross effusions, joint misalignment or acute arthritis  Shanita-ulcer skin: mostly indurated, pink-red and warm, slightly moist, a bit of epidermal lysis, one focus medially with some purplish and cool full-thickness skin, which doesn't look like it will survive. Ulcer(s): actually looks better than I expected - mostly red, a bit of fibrinous debris and minor sloughy necrosis, no malodor, no new purulence; part of the 1st met head IS exposed, but looks healthy, shiny, white, with a bit of red tissue immediately adjacent. Photos also saved in electronic chart.  ______________________________    Lab Results   Component Value Date    LABALBU 3.6 07/24/2020     Lab Results   Component Value Date    CREATININE 0.7 (L) 07/25/2020     Lab Results   Component Value Date    HGB 11.4 (L) 07/25/2020     Lab Results   Component Value Date    LABA1C 11.7 07/24/2020     Glucose was > 500 in the ER, down to the 300 range, now lower 200s. WBC down from 13k to 10.5, platelets normal, left shift resolved, slight monocytosis. BCx negative    Abscess Cx -- 2+ RBC, 1+ WBC, 3+ GPC, 1+ GNR -- heavy growth of GBBHS, moderate growth of Strep constellatus, anaerobic Cx pending. Assessment:     Patient Active Problem List   Diagnosis Code    Hypertension I10    Uncontrolled type 2 diabetes mellitus with diabetic polyneuropathy, with long-term current use of insulin ([facility]) E11.42, Z79.4, E11.65    Cardiomyopathy ([facility] I42.9    Mixed hyperlipidemia E78.2    Diabetic ulcer of toe of left foot associated with type 2 diabetes mellitus, with necrosis of bone ([facility]) E11.621, L97.524    Non compliance w medication regimen Z91.14    Toe osteomyelitis, left ([facility]) M86.9    Gangrene of foot ([address] I96    Sepsis due to group B Streptococcus ([facility]) A40.1    Cellulitis of left foot L03. 116    Allergic rhinitis J30.9    Arthritis M19.90    Osteoarthritis M19.90       Assessment of today's active condition(s): uncontrolled DM2, neuropathy, recurrent callus and seemingly superficial ulcer of the left great toe, but then worsened last week with rapid development of soft tissue

## 2020-07-27 NOTE — PROGRESS NOTES
Shift assessment complete. Pt is alert and oriented. Vital signs are WNL. Respirations are even and easy. No complaints voiced. Pt denies needs at this time. SR up x 2 and bed in low position. Call light is within reach.

## 2020-07-27 NOTE — FLOWSHEET NOTE
07/27/20 0800   Vital Signs   Temp 98.9 °F (37.2 °C)   Temp Source Oral   Pulse 94   Heart Rate Source Monitor   Resp 18   BP (!) 150/93   BP Location Left upper arm   BP Upper/Lower Upper   Patient Position Semi fowlers   Level of Consciousness 0   MEWS Score 1   Oxygen Therapy   SpO2 95 %   O2 Device None (Room air)   Patient resting in bed at this time. Alert and oriented x 4. Received medications per order this shift. Patient did c/o increased nausea and emesis x 1 this morning. Received Zofran per PRN order at 0820 with effective results. Assessment complete, see flowsheet. Resp e/e, no s/s of distress noted. Call light within reach.

## 2020-07-27 NOTE — CONSULTS
difficile infection, Diabetic ulcer of right great toe associated with type 2 diabetes mellitus, with necrosis of bone ([address] Diet-controlled type 2 diabetes mellitus ([address] Tear of medial meniscus of left knee, and Tobacco use. He has a past surgical history that includes Tonsillectomy; [place] tooth extraction; Knee arthroscopy (Left, [id number]); Toe amputation (Right, 8/23/2019); and other surgical history (Left, 07/24/2020). His family history includes Diabetes in his father and mother; High Blood Pressure in his father, maternal grandmother, maternal uncle, mother, and sister; High Cholesterol in his father, maternal uncle, and mother; Stroke in his father. Mr. Nathalie Pelayo reports that he quit smoking about 14 years ago. He has a 1.00 pack-year smoking history. He quit smokeless tobacco use about 14 years ago. He reports current alcohol use. He reports that he does not use drugs. His current home medication list consists of Insulin Syringe-Needle U-100, Lancets, PARoxetine, ammonium lactate, blood glucose test strips, carvedilol, furosemide, gabapentin, glimepiride, insulin glargine, insulin lispro, loratadine, losartan, sildenafil, simvastatin, tiZANidine, and traZODone. In the ER he received single doses of linezolid, cefepime and Flagyl. Allergies: Januvia [sitagliptin]; Metformin and related; Vancomycin; and Mustard oil [allyl isothiocyanate]; the vancomycin reaction sounded like \"red-man syndrome\" more than a true allergy. Pertinent items from the review of systems are discussed in the HPI; the remainder of the ROS was reviewed and is negative.      Objective:      7/24/2020  12:05 7/24/2020  17:45   BP: 131/92 145/90   Pulse: 121 99   Resp: 18 18   Temp: 102.8 100.1   TempSrc: oral oral   SpO2: 97 99   Weight: 255    Height:       August 2019 JOEL -- right 1.15, left 5.52 (arm systolic 924 (PICC on other side), right ankle 144 & 150, left ankle 140 & 142)    Constitutional:  well-developed, well-nourished, overweight, NAD  Psychiatric:  oriented to person, place and time; mood and affect appropriate for the situation   Eyes:  pupils equal, round and reactive to light; sclerae anicteric, conjunctivae not pale  Cardiovascular:  bilateral pedal pulses palpable, feet warm, good cap refill; mild left lower extremity edema and some chronic right pedal edema  Neuro: no allodynia; loss of pedal protective sensation on both feet  Lymphatic:  Palpable but nontender left inguinal nodes; no angitis; cellulitis of the left forefoot and midfoot  Musculoskeletal:  no clubbing, cyanosis or petechiae; RLE and LLE with no gross effusions or acute arthritis; right foot with a small degree of midfoot collapse, not quite rocker bottom  Shanita-ulcer skin: toward the base of the toe, cellulitic; immediately around the primary ulcer is some moist callus; distal aspect of the hallux is purple, pale, swollen, quite cool. Ulcer(s): primary neuropathic ulcer medially has some soft tissue necrosis, malodor; dorsum of the great toe with an area of black ischemic necrosis. Photos also saved in electronic chart.  ______________________________    Lab Results   Component Value Date    LABALBU 3.6 07/24/2020     Lab Results   Component Value Date    LABA1C 11.7 07/24/2020     Other labs from today -- Lactate 2.2, initial glucose 512. Alk phos 281, other LFTs normal.      WBC 13k, Hgb 13.8, plts 252k. BCx pending. Prior (+) Cdiff result noted. Wound Cx from last August only with mixed skin keith, but not sure if that was already on Abx; presumed anaerobes also. XR from today reviewed -- significant soft tissue swelling, foci of soft tissue gas in the hallux, and probable osteo of the medial base of the 1st distal phalanx.     Assessment:     Patient Active Problem List   Diagnosis Code    Hypertension I10    Uncontrolled type 2 diabetes mellitus with diabetic polyneuropathy, with long-term current use of insulin ([facility] E11.42, Z79.4, E11.65    Cardiomyopathy ([facility] I42.9    Mixed hyperlipidemia E78.2    Diabetic ulcer of toe of left foot associated with type 2 diabetes mellitus, with necrosis of bone ([facility]) E11.621, L97.524    Non compliance w medication regimen Z91.14    Toe osteomyelitis, left ([facility]) M86.9    Gangrene of foot ([facility] I96    Sepsis due to group B Streptococcus ([facility]) A40.1    Cellulitis of left foot L03. 116    Allergic rhinitis J30.9    Arthritis M19.90    Osteoarthritis M19.90       Assessment of today's active condition(s): uncontrolled DM2, dense neuropathy, recurrent and relapsing callus and ulcer of the left great toe in recent months, complicated now by an aggressive soft tissue infection with soft tissue gas production, gangrene of the hallux, probably underlying acute osteomyelitis and sepsis syndrome. High chance of bacteremia; infection likely polymicrobial (beta Strep or Staph aureus + anaerobes, +/- others). Agree this is a surgical emergency, agree with empiric antibiotic choice to cover most Gram-positives including MRSA, most gram-negatives, and anaerobes; linezolid has some anti-toxin-production activity like clindamycin does, so a good choice in someone with a Hx of intolerance of vancomycin. Definitely a high risk of progressing to a more proximal level of amputation, but I think the great toe needs to be removed first, obviously probe into the medial forefoot to see how things seem, and then go from there. Factors contributing to occurrence and/or persistence of the chronic ulcer include diabetes, poor glucose control, shear force, obesity and non-adherence. Treatment plan:     Agree with emergency plans for hallux amputation and exploration of the foot tonight. Await Cxs. Reasonable to continue the same Abx for now (linezolid q12, Flagyl q8, cefepime q8 - q12).     For the short-term future, might consider HBO therapy, but there's not really an emergency role for it for the gas

## 2020-07-28 VITALS
HEIGHT: 72 IN | WEIGHT: 248.6 LBS | TEMPERATURE: 98.8 F | SYSTOLIC BLOOD PRESSURE: 155 MMHG | OXYGEN SATURATION: 94 % | HEART RATE: 83 BPM | BODY MASS INDEX: 33.67 KG/M2 | DIASTOLIC BLOOD PRESSURE: 93 MMHG | RESPIRATION RATE: 18 BRPM

## 2020-07-28 LAB
ANAEROBIC CULTURE: ABNORMAL
ANAEROBIC CULTURE: ABNORMAL
BLOOD CULTURE, ROUTINE: NORMAL
CULTURE SURGICAL: ABNORMAL
CULTURE SURGICAL: ABNORMAL
CULTURE, BLOOD 2: NORMAL
GLUCOSE BLD-MCNC: 238 MG/DL (ref 70–99)
GLUCOSE BLD-MCNC: 254 MG/DL (ref 70–99)
GLUCOSE BLD-MCNC: 272 MG/DL (ref 70–99)
GRAM STAIN RESULT: ABNORMAL
ORGANISM: ABNORMAL
PERFORMED ON: ABNORMAL

## 2020-07-28 PROCEDURE — 2580000003 HC RX 258: Performed by: INTERNAL MEDICINE

## 2020-07-28 PROCEDURE — 6360000002 HC RX W HCPCS: Performed by: PODIATRIST

## 2020-07-28 PROCEDURE — 99238 HOSP IP/OBS DSCHRG MGMT 30/<: CPT | Performed by: INTERNAL MEDICINE

## 2020-07-28 PROCEDURE — 6360000002 HC RX W HCPCS: Performed by: INTERNAL MEDICINE

## 2020-07-28 PROCEDURE — 6370000000 HC RX 637 (ALT 250 FOR IP): Performed by: NURSE PRACTITIONER

## 2020-07-28 PROCEDURE — 6370000000 HC RX 637 (ALT 250 FOR IP): Performed by: PODIATRIST

## 2020-07-28 PROCEDURE — 99232 SBSQ HOSP IP/OBS MODERATE 35: CPT | Performed by: INTERNAL MEDICINE

## 2020-07-28 RX ORDER — GLIMEPIRIDE 4 MG/1
4 TABLET ORAL
Qty: 90 TABLET | Refills: 1 | Status: SHIPPED | OUTPATIENT
Start: 2020-07-28 | End: 2020-12-04

## 2020-07-28 RX ORDER — FUROSEMIDE 20 MG/1
20 TABLET ORAL DAILY
Qty: 90 TABLET | Refills: 1 | Status: SHIPPED | OUTPATIENT
Start: 2020-07-28 | End: 2020-11-10 | Stop reason: SDUPTHER

## 2020-07-28 RX ORDER — AMOXICILLIN AND CLAVULANATE POTASSIUM 875; 125 MG/1; MG/1
1 TABLET, FILM COATED ORAL 2 TIMES DAILY
Qty: 14 TABLET | Refills: 0 | Status: SHIPPED | OUTPATIENT
Start: 2020-07-28 | End: 2020-08-10 | Stop reason: ALTCHOICE

## 2020-07-28 RX ORDER — PAROXETINE 10 MG/1
10 TABLET, FILM COATED ORAL EVERY MORNING
Qty: 90 TABLET | Refills: 1 | Status: SHIPPED | OUTPATIENT
Start: 2020-07-28 | End: 2020-11-19

## 2020-07-28 RX ORDER — INSULIN GLARGINE 100 [IU]/ML
60 INJECTION, SOLUTION SUBCUTANEOUS NIGHTLY
Status: DISCONTINUED | OUTPATIENT
Start: 2020-07-28 | End: 2020-07-28 | Stop reason: HOSPADM

## 2020-07-28 RX ORDER — CARVEDILOL 12.5 MG/1
12.5 TABLET ORAL 2 TIMES DAILY WITH MEALS
Qty: 180 TABLET | Refills: 1 | Status: SHIPPED | OUTPATIENT
Start: 2020-07-28 | End: 2020-11-10 | Stop reason: SDUPTHER

## 2020-07-28 RX ADMIN — FLUTICASONE PROPIONATE 1 SPRAY: 50 SPRAY, METERED NASAL at 08:39

## 2020-07-28 RX ADMIN — INSULIN LISPRO 15 UNITS: 100 INJECTION, SOLUTION INTRAVENOUS; SUBCUTANEOUS at 08:53

## 2020-07-28 RX ADMIN — ATORVASTATIN CALCIUM 10 MG: 10 TABLET, FILM COATED ORAL at 08:40

## 2020-07-28 RX ADMIN — LOSARTAN POTASSIUM 50 MG: 25 TABLET, FILM COATED ORAL at 08:40

## 2020-07-28 RX ADMIN — GABAPENTIN 800 MG: 400 CAPSULE ORAL at 14:37

## 2020-07-28 RX ADMIN — INSULIN LISPRO 6 UNITS: 100 INJECTION, SOLUTION INTRAVENOUS; SUBCUTANEOUS at 08:51

## 2020-07-28 RX ADMIN — GABAPENTIN 800 MG: 400 CAPSULE ORAL at 08:40

## 2020-07-28 RX ADMIN — CETIRIZINE HYDROCHLORIDE 10 MG: 10 TABLET ORAL at 08:40

## 2020-07-28 RX ADMIN — PAROXETINE HYDROCHLORIDE 10 MG: 20 TABLET, FILM COATED ORAL at 08:39

## 2020-07-28 RX ADMIN — CARVEDILOL 12.5 MG: 6.25 TABLET, FILM COATED ORAL at 08:40

## 2020-07-28 RX ADMIN — AMPICILLIN SODIUM AND SULBACTAM SODIUM 3 G: 2; 1 INJECTION, POWDER, FOR SOLUTION INTRAMUSCULAR; INTRAVENOUS at 12:42

## 2020-07-28 RX ADMIN — AMPICILLIN SODIUM AND SULBACTAM SODIUM 3 G: 2; 1 INJECTION, POWDER, FOR SOLUTION INTRAMUSCULAR; INTRAVENOUS at 06:02

## 2020-07-28 RX ADMIN — ACETAMINOPHEN 650 MG: 325 TABLET ORAL at 08:53

## 2020-07-28 RX ADMIN — ENOXAPARIN SODIUM 40 MG: 100 INJECTION SUBCUTANEOUS at 08:39

## 2020-07-28 RX ADMIN — INSULIN LISPRO 6 UNITS: 100 INJECTION, SOLUTION INTRAVENOUS; SUBCUTANEOUS at 12:42

## 2020-07-28 ASSESSMENT — PAIN SCALES - GENERAL
PAINLEVEL_OUTOF10: 5
PAINLEVEL_OUTOF10: 0

## 2020-07-28 NOTE — PROGRESS NOTES
Shift assessment complete. Pt is alert and oriented. Pt is noted to by hypertensive. Respirations are even and easy. No complaints voiced. Pt denies needs at this time. SR up x 2 and bed in low position. Call light is within reach.

## 2020-07-28 NOTE — PROGRESS NOTES
Pt is in bed with eyes closed. No s/s of distress at this time. Call light and bedside table within reach.

## 2020-07-28 NOTE — DISCHARGE INSTR - COC
M86.9    Gangrene of foot ([facility] I96    Sepsis due to group B Streptococcus ([facility] A40.1    Cellulitis of left foot L03. 116    Allergic rhinitis J30.9    Arthritis M19.90    Osteoarthritis M19.90    Septicemia ([facility] A41.9       Isolation/Infection:   Isolation          No Isolation        Patient Infection Status     None to display          Nurse Assessment:  Last Vital Signs: BP (!) 155/93   Pulse 83   Temp 98.8 °F (37.1 °C) (Oral)   Resp 18   Ht 6' (1.829 m)   Wt 248 lb 9.6 oz (112.8 kg)   SpO2 94%   BMI 33.72 kg/m²     Last documented pain score (0-10 scale): Pain Level: 0  Last Weight:   Wt Readings from Last 1 Encounters:   07/24/20 248 lb 9.6 oz (112.8 kg)     Mental Status:  oriented and alert    IV Access:  - None    Nursing Mobility/ADLs:  Walking   Independent  Transfer  Independent  Bathing  Independent  Dressing  Independent  [facility]  Independent  Med Delivery   none    Wound Care Documentation and Therapy:        Elimination:  Continence:   · Bowel: Yes  · Bladder: Yes  Urinary Catheter: None   Colostomy/Ileostomy/Ileal Conduit: No       Date of Last BM: 7/28/20    Intake/Output Summary (Last 24 hours) at 7/28/2020 1443  Last data filed at 7/28/2020 0720  Gross per 24 hour   Intake --   Output 2350 ml   Net -2350 ml     I/O last 3 completed shifts: In: 240 [P.O.:240]  Out: 3308 [Urine:1550]    Safety Concerns:     None    Impairments/Disabilities:      None    Nutrition Therapy:  Current Nutrition Therapy:   - Oral Diet:  Carb Control 3 carbs/meal (1500kcals/day)    Routes of Feeding: Oral  Liquids: No Restrictions  Daily Fluid Restriction: no  Last Modified Barium Swallow with Video (Video Swallowing Test): not done    Treatments at the Time of Hospital Discharge:   Respiratory Treatments:   Oxygen Therapy:  is not on home oxygen therapy.   Ventilator:    - No ventilator support    Rehab Therapies:   Weight Bearing Status/Restrictions: No weight bearing restirctions  Other Medical Equipment (for information only, NOT a DME order): Other Treatments:     Patient's personal belongings (please select all that are sent with patient):  None    RN SIGNATURE:  Electronically signed by Adelaide Keen RN on 7/28/20 at 2:48 PM EDT    CASE MANAGEMENT/SOCIAL WORK SECTION    Inpatient Status Date: ***    Readmission Risk Assessment Score:  Readmission Risk              Risk of Unplanned Readmission:        13           Discharging to Facility/ Agency   · Name:   · Address:  · Phone:  · Fax:    Dialysis Facility (if applicable)   · Name:  · Address:  · Dialysis Schedule:  · Phone:  · Fax:    / signature: {Esignature:[id number]}    PHYSICIAN SECTION    Prognosis: {Prognosis:2228901314}    Condition at Discharge: [address] [place] [address] Patient Condition:[id number]}    Rehab Potential (if transferring to Rehab): {Prognosis:2432348822}    Recommended Labs or Other Treatments After Discharge: ***    Physician Certification: I certify the above information and transfer of Beny Good  is necessary for the continuing treatment of the diagnosis listed and that he requires {Admit to Appropriate Level of Care:06034} for {GREATER/LESS:792956846} 30 days.      Update Admission H&P: [facility]}    PHYSICIAN SIGNATURE:  {Esignature:[id number]}

## 2020-07-28 NOTE — PROGRESS NOTES
Shift assessment complete. Pt is alert and oriented. PT hypertensive, bp meds to be administered this AM. Respirations are even and easy. No complaints voiced. Pt denies needs at this time. SR up x 2 and bed in low position. Call light is within reach.

## 2020-07-28 NOTE — PROGRESS NOTES
Progress Note    Admit Date:  7/24/2020      Admitted with left great toe osteomyelitis and sepsis. S/P partial left foot amputation. POD #4  Subjective:  Mr. Jeovanny Hall is stable. Left foot in dressing. Pain controlled. No fevers. Upset about sugars      Objective:   BP (!) 155/93   Pulse 83   Temp 98.8 °F (37.1 °C) (Oral)   Resp 18   Ht 6' (1.829 m)   Wt 248 lb 9.6 oz (112.8 kg)   SpO2 94%   BMI 33.72 kg/m²       Intake/Output Summary (Last 24 hours) at 7/28/2020 0748  Last data filed at 7/28/2020 0720  Gross per 24 hour   Intake 240 ml   Output 2350 ml   Net -2110 ml         Physical Exam:-  General: young obese male,   Awake, alert, NAD  Skin:  Warm and dry  Neck:  JVD absent. Neck supple  Chest:  Clear to auscultation, respiration easy. No wheezes, rales or rhonchi. Cardiovascular:  RRR ,S1S2 normal  Abdomen:  Soft, non tender, non distended, BS +  Extremities:  No edema. Left foot in dressing. Chronic healed scars on both shins  Right great toe amputation  Intact peripheral pulses. Brisk cap refill, < 2 secs  Neuro: non focal      Medications:   Scheduled Meds:   insulin glargine  60 Units Subcutaneous Nightly    ampicillin-sulbactam  3 g Intravenous Q6H    insulin lispro  0-12 Units Subcutaneous TID WC    insulin lispro  0-6 Units Subcutaneous Nightly    cetirizine  10 mg Oral Daily    fluticasone  1 spray Each Nostril Daily    carvedilol  12.5 mg Oral BID WC    gabapentin  800 mg Oral TID    insulin lispro  15 Units Subcutaneous TID AC    losartan  50 mg Oral Daily    PARoxetine  10 mg Oral QAM    atorvastatin  10 mg Oral Daily    tiZANidine  4 mg Oral Nightly    sodium chloride flush  10 mL Intravenous 2 times per day    enoxaparin  40 mg Subcutaneous Daily       Continuous Infusions:   dextrose         Data:  CBC:   No results for input(s): WBC, RBC, HGB, HCT, MCV, RDW, PLT in the last 72 hours.   BMP:   No results for input(s): NA, K, CL, CO2, PHOS, BUN, CREATININE in the last 72 hours. Invalid input(s): CA  LIVER PROFILE:   No results for input(s): AST, ALT, ALB, BILIDIR, BILITOT, ALKPHOS in the last 72 hours. Radiology  XR TOE LEFT (MIN 2 VIEWS)   Final Result   Suspect osteomyelitis medial base of the 1st distal phalanx with moderate   soft tissue edema and soft tissue emphysema. Wound c x - strep agalactiae, steptococcus constellus, peptoniphilus, prevotella     Assessment:    Active Problems:    Diabetes mellitus ([address]    Diabetic ulcer of toe of left foot associated with type 2 diabetes mellitus, with necrosis of bone (HCC)    Toe osteomyelitis, left (HCC)    Gangrene of foot ([address]    Sepsis due to group B Streptococcus (HCC)    Cellulitis of left foot    Arthritis    Osteoarthritis    Septicemia ([address]  Resolved Problems:    Hyperglycemia      Plan:    Sepsis (POA- leukocytosis, febrile, tachycardia, LA, source)   - 2/2 left toe/foot osteomyelitis   - cont  IV Abx as below  - Blood Cx: NGTD. wound CX growing strep and anaerobes   - BP stable, no need for pressors   - sepsis improved     Osteomyelitis of L great toe   - sec to uncontrolled DM  - treated with Cefepime, Zyvox and Flagyl based on patients allergies, D#4  - Consulted podiatry and ID  - S/p partial L foot amputation on 7/24 with Dr. Raya Haines   - wound cx wit strep species and anaerobes   - POD#4- changed abx to unasyn by ID  - Continue elevation, wound care  - fevers resolved. Pain controlled. Dc planning       Hyponatremia  - 2/2 hyperglycemia  - improved with glucose correction      DM2  - Uncontrolled with complications of neuropathy, wounds - a1c at 11  - BG >500 on arrival  - Continue Lantus, SSI and pre-prandial humalog-->increased  to medium SSI  - increased Lantus to 55 units nightly. HTN  - BP is controlled  - Continue BB, Losartan     HLD  - Continue statin      Obesity  - Body mass index is 33.72 kg/m².   - Recommended weight loss     DVT Prophylaxis: Lovenox   Diet: DIET CARB CONTROL;   Code Status: Full Code    Ashwin Calix MD 7/28/2020 7:48 AM

## 2020-07-28 NOTE — ANESTHESIA POSTPROCEDURE EVALUATION
Department of Anesthesiology  Postprocedure Note    Patient: Beny Good  MRN: [id number]  YOB: 1975  Date of evaluation: 7/28/2020  Time:  10:33 AM     Procedure Summary     Date:  07/24/20 Room / Location:  [place] [address] / [facility]    Anesthesia Start:  1843 Anesthesia Stop:  1910    Procedure:  PARTIAL LEFT FOOT AMPUTATION (Left Foot) Diagnosis:  (GAS GANGRENE)    Surgeon:  Saint Flank, DPM Responsible Provider:  Yahaira Muniz MD    Anesthesia Type:  general ASA Status:  3 - Emergent          Anesthesia Type: general    Deysi Phase I: Deysi Score: 10    Deysi Phase II:      Last vitals: Reviewed and per EMR flowsheets.        Anesthesia Post Evaluation    Comments: Postoperative Anesthesia Note    Name:    Beny Good  MRN:      [id number]    Patient Vitals in the past 12 hrs:  07/28/20 0719, BP:(!) 155/93, Temp:98.8 °F (37.1 °C), Temp src:Oral, Pulse:83, Resp:18, SpO2:94 %  07/28/20 0251, BP:134/88, Temp:98.6 °F (37 °C), Temp src:Oral, Pulse:86, Resp:18, SpO2:95 %     LABS:    CBC  Lab Results       Component                Value               Date/Time                  WBC                      10.5                07/25/2020 05:30 AM        HGB                      11.4 (L)            07/25/2020 05:30 AM        HCT                      32.7 (L)            07/25/2020 05:30 AM        PLT                      207                 07/25/2020 05:30 AM   RENAL  Lab Results       Component                Value               Date/Time                  NA                       134 (L)             07/25/2020 05:30 AM        K                        3.5                 07/25/2020 05:30 AM        CL                       97 (L)              07/25/2020 05:30 AM        CO2                      20 (L)              07/25/2020 05:30 AM        BUN                      11                  07/25/2020 05:30 AM        CREATININE               0.7 (L)             07/25/2020 05:30 AM GLUCOSE                  280 (H)             07/25/2020 05:30 AM   TATYANA  Lab Results       Component                Value               Date/Time                  PROTIME                  12.1                08/06/2019 10:53 AM        INR                      1.06                08/06/2019 10:53 AM        APTT                     26.2                09/01/2014 06:36 AM     Intake & Output:  @64EPXY@    Nausea & Vomiting:  No    Level of Consciousness:  Awake    Pain Assessment:  Adequate analgesia    Anesthesia Complications:  No apparent anesthetic complications    SUMMARY      Vital signs stable  OK to discharge from Stage I post anesthesia care.   Care transferred from Anesthesiology department on discharge from perioperative area

## 2020-07-28 NOTE — PROGRESS NOTES
use drugs. His current home medication list consists of Insulin Syringe-Needle U-100, Lancets, PARoxetine, ammonium lactate, blood glucose test strips, carvedilol, furosemide, gabapentin, glimepiride, insulin glargine, insulin lispro, loratadine, losartan, sildenafil, simvastatin, tiZANidine, and traZODone. IV Abx narrowed to Unasyn yesterday (today is POD 4). Allergies: Januvia [sitagliptin]; Metformin and related; Vancomycin; and Mustard oil [allyl isothiocyanate]    Pertinent items from the review of systems are discussed in the HPI; the remainder of the ROS was reviewed and is negative. Objective:     Vitals:    07/27/20 1615 07/27/20 2045 07/28/20 0251 07/28/20 0719   BP: (!) 150/84 (!) 141/93 134/88 (!) 155/93   Pulse: 96 91 86 83   Resp: 18 18 18 18   Temp: 98.8 °F (37.1 °C) 99.3 °F (37.4 °C) 98.6 °F (37 °C) 98.8 °F (37.1 °C)   TempSrc: Oral Oral Oral Oral   SpO2: 97% 95% 95% 94%   Weight:       Height:           Constitutional:  well-developed, well-nourished, overweight, fatigued, does not look weak and uncomfortable like he did yesterday  Psychiatric:  oriented to person, place and time; mood and affect appropriate for the situation   Eyes:  pupils equal, round and reactive to light; sclerae anicteric, conjunctivae not pale  ENT: no thrush or oral ulcers, mucous membranes moist  Abd: soft, NT, ND, good BS  Cardiovascular:  bilateral pedal pulses palpable, foot warm, good cap refill (apart from one medial skin edge); mild lower extremity edema  Lymphatic:  no inguinal or popliteal adenopathy, no angitis, faded and receded cellulitis.  Both lower legs with atrophic scarring that I think might most likely be from prior necrobiosis ulcers (he thought perhaps infection or trauma / insect bites, etc)  Musculoskeletal:  no clubbing, cyanosis or petechiae; RLE and LLE with no gross effusions, joint misalignment or acute arthritis  Shanita-ulcer skin: mostly pink and indurated, less maceration today, one medial edge still necrosing a bit. Ulcer(s): perhaps a bit more met head exposed, but it's still shiny and white; surrounding tissues a combination of old hemorhage, some red tissue, some sloughy yellow, not a lot of true granulation yet.       ______________________________    Lab Results   Component Value Date    LABALBU 3.6 07/24/2020     Lab Results   Component Value Date    CREATININE 0.7 (L) 07/25/2020     Lab Results   Component Value Date    HGB 11.4 (L) 07/25/2020     Lab Results   Component Value Date    LABA1C 11.7 07/24/2020     WCx -- GBBHS, Strep constellatus, Prevotella, Peptoniphilus. OR path with acute osteomyelitis, but that was the hallux, was was resected in toto. Assessment:     Patient Active Problem List   Diagnosis Code    Hypertension I10    Diabetes mellitus ([facility] [address] E11.9    Cardiomyopathy ([facility] [address] I42.9    Mixed hyperlipidemia E78.2    Diabetic ulcer of toe of left foot associated with type 2 diabetes mellitus, with necrosis of bone ([facility]) E11.621, L97.524    Non compliance w medication regimen Z91.14    Toe osteomyelitis, left ([facility]) M86.9    Gangrene of foot ([facility] [address] I96    Sepsis due to group B Streptococcus ([facility]) A40.1    Cellulitis of left foot L03. 116    Allergic rhinitis J30.9    Arthritis M19.90    Osteoarthritis M19.90    Septicemia ([facility] [address] A41.9       Assessment of today's active condition(s): uncontrolled DM2, neuropathy, Reyna 4 left diabetic foot, with a hallux callus and ulcer that kept recurring, then last week was complicated by rapid onset of cellulitis, abscess, soft tissue gas production, sepsis syndrome, gangrene of the great toe. POD 4 from an open amputation, with soft tissue infection improving, sepsis resolved, osteomyelitis presumed resolved, but still at high risk of more proximal amputation, primarily because of his poor glucose control.  I'm 50-50 as to whether he's going to need his metatarsal head resected to increase his chances of healing, but we can see how he does for the next several days first. Systemically feeling back to baseline, which is encouraging. Factors contributing to occurrence and/or persistence of the chronic ulcer include edema, diabetes, poor glucose control, shear force, obesity, decreased tissue oxygenation and non-adherence. Discharge plan:     I re-dressed the foot today with silver alginate, 4x4s, ABD, Kerlix, Ace. Armin Glaser for home today. FU in [facility] on Friday for a dressing change, then on Monday to see Dr. Clint Silva and me. E-scribing a week of Augmentin. Stay off the foot as much as possible; for ADLs around the house, weight-bear on the heel. Need to really work harder on getting glucoses down. D/W Drew Daniel and Angela.     Electronically signed by Gabriella Anders MD on 7/28/2020 at 2:21 PM.

## 2020-07-28 NOTE — PROGRESS NOTES
Patient discharge completed and discussed. AVS signed. Patient has no questions at this time. All IV's have been removed and patient is stable for transfer to discharge.

## 2020-07-28 NOTE — DISCHARGE SUMMARY
Name:  Angelica Banks  Room:  0215/0215-02  MRN:    [id number]    Discharge Summary      This discharge summary is in conjunction with a complete physical exam done on the day of discharge. Discharging Physician: Naman Dhaliwal MD      Admit: 7/24/2020  Discharge:  7/28/2020    HPI taken from admission H&P:      The patient is a 45 y.o. male with DM, HTN, HLD who presented to [facility] ED with complaint of foot infection. He states a few weeks ago, he developed some redness to his left great toe. He and wife cleaned and bandaged the foot/toe. He called Dr. Loraine Chin. He was placed on antibiotics. He states it got better and looked good for a few days. Then about a week ago, he noticed it was red again. They were cleaning and dressing the wound. He states on Thursday, he noticed his toe was blue/purple. They took off the dressing to look at it. He states the skin on his toe came off and was draining infection. He f/w Dr. Loraine Chin. Upon arrival he was febrile with fever 102.8. Patient with tachycardia, leukocytosis, and lactic acidosis. X-ray with osteomyelitis. Patient is s/p left great toe amputation. Admitted to med-surg. Podiatry consulted. Diagnoses this Admission and Hospital Course     Sepsis (POA- leukocytosis, febrile, tachycardia, LA, source)   - 2/2 left toe/foot osteomyelitis   - cont IV Abx as below  - Blood Cx: NGTD. wound CX growing strep and anaerobes   - BP stable, no need for pressors   - sepsis improved     Osteomyelitis of L great toe   - 2/2 uncontrolled DM  - treated with Cefepime, Zyvox and Flagyl based on patients allergies, D#4  - Consulted podiatry and ID  - S/p partial L foot amputation on 7/24 with Dr. Loraine Chin   - wound cx wit strep species and anaerobes   - POD#4- changed abx to unasyn by ID  - Continue elevation, wound care  - fevers resolved. Pain controlled. Dc on augmentin.  Has f/w this Friday in wound clinic     Hyponatremia  - 2/2 hyperglycemia  - improved with glucose correction      DM2  - Uncontrolled with complications of neuropathy, wounds - a1c at 11  - BG >500 on arrival  - Continued Lantus, SSI and pre-prandial humalog--> increased to medium SSI  - increased Lantus to 55 units nightly.      HTN  - BP is controlled  - Continued BB, Losartan resume lasix at dc     HLD  - Continued statin      Obesity  - Body mass index is 33.72 kg/m². - Recommended weight loss    Procedures (Please Review Full Report for Details)  Partial Left Foot Amputation    Consults    Infectious Disease  Podiatry    Physical Exam at Discharge:    BP (!) 155/93   Pulse 83   Temp 98.8 °F (37.1 °C) (Oral)   Resp 18   Ht 6' (1.829 m)   Wt 248 lb 9.6 oz (112.8 kg)   SpO2 94%   BMI 33.72 kg/m²   General: young obese male,   Awake, alert, NAD  Skin:  Warm and dry  Neck:  JVD absent. Neck supple  Chest:  Clear to auscultation, respiration easy. No wheezes, rales or rhonchi. Cardiovascular:  RRR ,S1S2 normal  Abdomen:  Soft, non tender, non distended, BS +  Extremities:  No edema. Left foot in dressing. Chronic healed scars on both shins  Right great toe amputation  Intact peripheral pulses. Brisk cap refill, < 2 secs  Neuro: non focal    CULTURES    Blood cx x2: NGTD    Surgical cx: 2+ RBCs, 1+ WBCs - poly, 3+ GP cocci - Strep agalactiae, Peptoniphilus asaccharolyticus, prevotella denticola    RADIOLOGY    XR TOE LEFT (MIN 2 VIEWS)   Final Result   Suspect osteomyelitis medial base of the 1st distal phalanx with moderate   soft tissue edema and soft tissue emphysema. Discharge Medications     Medication List      START taking these medications    amoxicillin-clavulanate 875-125 MG per tablet  Commonly known as:  AUGMENTIN  Take 1 tablet by mouth 2 times daily for 7 days        CHANGE how you take these medications    insulin glargine 300 UNIT/ML injection pen  Commonly known as:   Camille Jesus  Inject 65 Units into the skin nightly  What changed:  how much to take     insulin lispro 100 UNIT/ML pen  Commonly known as:  HumaLOG KwikPen  Inject 15 Units into the skin 3 times daily (before meals)  What changed:    · how much to take  · additional instructions        CONTINUE taking these medications    ammonium lactate 12 % cream  Commonly known as:  Lac-Hydrin  Apply topically 2x daily. * blood glucose test strips strip  Commonly known as:  ExacTech Test  1 each by In Vitro route daily. As needed. * blood glucose test strips strip  Commonly known as:  Contour Next Test  USE TO TEST BLOOD SUGAR LEVELS 3 TIMES DAILY     carvedilol 12.5 MG tablet  Commonly known as:  COREG  Take 1 tablet by mouth 2 times daily (with meals)     Claritin 10 MG tablet  Generic drug:  loratadine     furosemide 20 MG tablet  Commonly known as:  LASIX  Take 1 tablet by mouth daily     gabapentin 400 MG capsule  Commonly known as:  NEURONTIN  TAKE 2 CAPSULES BY MOUTH THREE TIMES DAILY. glimepiride 4 MG tablet  Commonly known as:  AMARYL  Take 1 tablet by mouth every morning (before breakfast)     Insulin Syringe-Needle U-100 29G X 1/2\" 0.3 ML Misc  Commonly known as:  AIMSCO INS SYR .3CC/29GX0.5\"  1 each by Does not apply route daily     Lancets Misc  Test three times a day with Contour Next EZ machine. DX: E11.9     losartan 50 MG tablet  Commonly known as:  COZAAR  TAKE 1 TABLET BY MOUTH DAILY     PARoxetine 10 MG tablet  Commonly known as:  PAXIL  Take 1 tablet by mouth every morning     sildenafil 100 MG tablet  Commonly known as:  VIAGRA  TAKE 1 TABLET BY MOUTH AS NEEDED FOR ERECTILE DYSFUNCTION     simvastatin 20 MG tablet  Commonly known as:  ZOCOR  TAKE 1 TABLET BY MOUTH NIGHTLY     tiZANidine 4 MG tablet  Commonly known as:  ZANAFLEX  TAKE 1 TABLET BY MOUTH NIGHTLY AS NEEDED FOR MUSCLE SPASMS     traZODone 50 MG tablet  Commonly known as:  DESYREL  TAKE 1 TABLET BY MOUTH NIGHTLY         * This list has 2 medication(s) that are the same as other medications prescribed for you.  Read the directions carefully, and ask your doctor or other care provider to review them with you. Where to Get Your Medications      These medications were sent to [facility] - [id number] - [address]  [address] [facility], [address] [facility]    Phone:  [phone]   · amoxicillin-clavulanate 875-125 MG per tablet  · carvedilol 12.5 MG tablet  · furosemide 20 MG tablet  · glimepiride 4 MG tablet  · PARoxetine 10 MG tablet           Discharged in stable condition to home. Follow Up: Follow up with PCP in 1 week.     Mikey Olvera MD 7/28/2020 2:54 PM

## 2020-07-29 ENCOUNTER — TELEPHONE (OUTPATIENT)
Dept: INTERNAL MEDICINE CLINIC | Age: 45
End: 2020-07-29

## 2020-07-29 NOTE — TELEPHONE ENCOUNTER
[facility] Transitions Initial Follow Up Call    Outreach made within 2 business days of discharge: Yes    Patient: Katie Walker Patient : 1975   MRN: [id number]  Reason for Admission: There are no discharge diagnoses documented for the most recent discharge. Discharge Date: 20       Spoke with: 2020 @ 12:35 pm: Left message to return call. Discharge department/facility: [facility]     TCM Interactive Patient Contact:  Was patient able to fill all prescriptions: Yes  Was patient instructed to bring all medications to the follow-up visit: Yes  Is patient taking all medications as directed in the discharge summary?  Yes  Does patient understand their discharge instructions: Yes  Does patient have questions or concerns that need addressed prior to 7-14 day follow up office visit: no    Scheduled appointment with PCP within 7-14 days    Follow Up  Future Appointments   Date Time Provider Gabe Jaquez   2020  2:45 PM MD Vasile Carrasco [facility]   8/3/2020  3:30 PM Mendy Bergman DPM [facility]   2020  3:20 PM Maritza Cevallos MD [facility] Int None       Fabrice Rhoades

## 2020-07-30 NOTE — OP NOTE
[address]                 [address] [place] [address]                                OPERATIVE REPORT    PATIENT NAME: Renee Dickey                   :        1975  MED REC NO:   [id number]                          ROOM:       021  ACCOUNT NO:   [de-identified]                           ADMIT DATE: 2020  PROVIDER:     Nelly Santos DPM    DATE OF PROCEDURE:  2020    PREOPERATIVE DIAGNOSES:  1.  Gas gangrene, left foot. 2.  Diabetic foot ulceration, left foot. 3.  Diabetes mellitus. POSTOPERATIVE DIAGNOSES:  1.  Gas gangrene, left foot. 2.  Diabetic foot ulceration, left foot. 3.  Diabetes mellitus. OPERATION PERFORMED:  Partial left foot amputation. SURGEON:  Nelly Santos DPM    ANESTHESIA:  Local MAC. HEMOSTASIS:  Ankle tourniquet, 250 mmHg. ESTIMATED BLOOD LOSS:  Less than 50 mL. REPORT OF OPERATION:  The patient was brought into the operating room  and placed on the operating table in supine position. Under mild IV  sedation, the left first ray was anesthetized with 20 mL of 1:1 mixture  of 1% lidocaine plain and 0.5% Marcaine plain. Foot was then scrubbed,  prepped, and draped in the usual sterile manner. Esmarch was then  utilized to exsanguinate the left foot. Ankle tourniquet was then  inflated to 250 mmHg. Attention was then directed to the left first ray where elliptical  incision was then made just distal to the base of the hallux. This was  carried down in order to expose the extensor and flexor tendons. These  were then transected. Elliptical incision was then made proximally  along the dorsal aspect of the proximal phalanx. This was dissected  back in order to expose the first metatarsophalangeal joint. Soft  tissues were then dissected free in this area, and then hallux was then  passed off the operative field in toto.   There was an extensive amount  of necrotic tissue noted in the hallux

## 2020-07-31 ENCOUNTER — HOSPITAL ENCOUNTER (OUTPATIENT)
Dept: WOUND CARE | Age: 45
Discharge: HOME | End: 2020-07-31
Payer: COMMERCIAL

## 2020-07-31 VITALS
HEART RATE: 87 BPM | BODY MASS INDEX: 33.97 KG/M2 | SYSTOLIC BLOOD PRESSURE: 157 MMHG | RESPIRATION RATE: 20 BRPM | WEIGHT: 250.8 LBS | HEIGHT: 72 IN | DIASTOLIC BLOOD PRESSURE: 100 MMHG | OXYGEN SATURATION: 98 % | TEMPERATURE: 98.5 F

## 2020-07-31 PROCEDURE — 99214 OFFICE O/P EST MOD 30 MIN: CPT

## 2020-07-31 ASSESSMENT — PAIN SCALES - GENERAL
PAINLEVEL_OUTOF10: 0
PAINLEVEL_OUTOF10: 8
PAINLEVEL_OUTOF10: 0

## 2020-07-31 ASSESSMENT — PAIN DESCRIPTION - PAIN TYPE: TYPE: ACUTE PAIN

## 2020-07-31 ASSESSMENT — PAIN - FUNCTIONAL ASSESSMENT: PAIN_FUNCTIONAL_ASSESSMENT: PREVENTS OR INTERFERES SOME ACTIVE ACTIVITIES AND ADLS

## 2020-07-31 ASSESSMENT — PAIN DESCRIPTION - DESCRIPTORS: DESCRIPTORS: CRAMPING

## 2020-07-31 ASSESSMENT — PAIN DESCRIPTION - ONSET: ONSET: ON-GOING

## 2020-07-31 ASSESSMENT — PAIN DESCRIPTION - LOCATION: LOCATION: FOOT

## 2020-07-31 ASSESSMENT — PAIN DESCRIPTION - PROGRESSION: CLINICAL_PROGRESSION: NOT CHANGED

## 2020-07-31 ASSESSMENT — PAIN DESCRIPTION - FREQUENCY: FREQUENCY: INTERMITTENT

## 2020-07-31 ASSESSMENT — PAIN DESCRIPTION - ORIENTATION: ORIENTATION: LEFT

## 2020-07-31 NOTE — PROGRESS NOTES
Patient is alert and oriented resting on exam table, trendelenburg position. R:  20, regular, unlabored at rest.  Bovie from OR is delivered in room. Manual BP cuff is on left leg pumped at 230 mm Hg at present. Dr. Clint Silva is at room. Uses Bovie to cauterize bleeding area of left foot.

## 2020-07-31 NOTE — PROGRESS NOTES
Dr. Berny Taylor is currently using Bovie to bleeding left foot, Hemostat is placed into wound, bovie used, no bleeding noted at present, [facility] blood pressure cuff is at 0 mm Hg .

## 2020-07-31 NOTE — PROGRESS NOTES
Bleeding stopped using Bovie per Dr Mimi Davis. Surgicel also placed in wound. Dressing applied by Ga Piña per MD orders. Pt. Has been in supine x5 minutes then VS taken-see flowsheet, pt. Denies pain or c/o at present. Pt. Then sat up to Semi-High fowlers position, pt. States dizziness present for a few seconds & then resolved. Will cont. To monitor.

## 2020-07-31 NOTE — PROGRESS NOTES
Pt. Sat up on side of bed, denies dizziness, VSS taken-see flowsheet. Will remain at bedside to monitor.

## 2020-07-31 NOTE — PLAN OF CARE
Pt. To [address] today for post-op wound evaluation/dressing & Dr Cassie Kim to evaluate per Dr Shannan Velázquez request. Count includes the [facility] noted & Dr Rand Jane had to be called to control bleeding, Bovie used per Dr Rand Jane & bleeding controlled. Surgicel also placed into wound. Dressing applied per MD orders & to remain in place until pt. Returns on Monday to see Dr Rand Jane in [address]. Pt. Aware if active bleeding occurs at home over the week, to place tourniquet & call 911. Pt. Given extra dressing supplies if strike-through drainage noted & needed to reinforce dressing at home if bleeding manageable. Pt. Advised to remain off of foot & use crutches or w/c, pt. Verbalizes understanding. Pt. Also encouraged to elevate foot & leg as much as possible. Discharge instructions reviewed with patient & wife, all questions answered, copy given to patient.

## 2020-07-31 NOTE — PROGRESS NOTES
Pt. Transferred to w/c in stable condition. Denies dizziness/light headedness. Pt. Also denies pain. No bleeding noted from dressing at this time. Discharge instructions reviewed with patient. Pt. Transported via w/c to his car in stable condition. Wife driving patient. Instructions also reviewed with wife & verbalizes understanding.

## 2020-08-03 ENCOUNTER — HOSPITAL ENCOUNTER (OUTPATIENT)
Dept: WOUND CARE | Age: 45
Discharge: HOME | End: 2020-08-03
Payer: COMMERCIAL

## 2020-08-03 VITALS
BODY MASS INDEX: 34.08 KG/M2 | SYSTOLIC BLOOD PRESSURE: 125 MMHG | HEART RATE: 101 BPM | RESPIRATION RATE: 20 BRPM | DIASTOLIC BLOOD PRESSURE: 85 MMHG | HEIGHT: 72 IN | WEIGHT: 251.6 LBS | TEMPERATURE: 99 F

## 2020-08-03 PROCEDURE — 99213 OFFICE O/P EST LOW 20 MIN: CPT

## 2020-08-03 ASSESSMENT — PAIN SCALES - GENERAL: PAINLEVEL_OUTOF10: 0

## 2020-08-03 NOTE — PLAN OF CARE
Pt to the [address] [place] [address],[address] [facility] for follow up appointment. Wound stable. Pt to have silver alginate and dry dressing applied to wound. Pt to continue on oral antibiotics. Pt to follow up in the [address] [place] [address],[address] [place] [address] in 1 week. Discussed with patient the importance of keeping blood sugars down and staying off of foot. Discharge instructions reviewed with patient, all questions answered, copy given to patient. Dressings were applied to all wounds per M.D. Instructions at this visit.

## 2020-08-04 NOTE — PROGRESS NOTES
No further arterial bleeding after the events of last Friday. Surgicell packing in place, and not disturbed today. Cellulitis seems to have faded and receded nicely -- just a bit of lateral dorsal forefoot erythema remaining. Tolerating Augmentin well; would complete the script that he has. Important to continue to make progress with glucose control, keep steps on the left foot to a minimum. A bit of encouragement today as the distal soft tissues might be adhering to the main part of the open wound bed, right near the met head. Still not \"out of the woods\" in terms of the possibility of going back to the OR for met head resection.      Electronically signed by Rachna Lopez MD on 8/4/2020 at 11:26 AM

## 2020-08-05 NOTE — PROGRESS NOTES
Mr. Syd Ross was here for his wound reassessment visit today, so I just took a quick look at his foot to help decide on the most appropriate dressing to get him through to his formal FU visit on Monday. There was some blood clot sitting in the lateral aspect of the wound, and as I probed it with a cotton-tipped swab to see if it was loose and could be removed, very brisk bleeding started from that space; it was not pulsatile, but was brisk enough to be arterial. I was able to stop the active bleeding quickly with pressure, but despite some Surgicell, prolonged pressure, leg elevation and Trendelenburg positioning, the bleeding quickly returned whenever I removed pressure from the area. Thankfully Dr. Gretta Robins was here in the building, and he was able to come down to see him, eventually did get the bleeding to stop with a significant amount of electrocautery (first from one of our handheld units, eventually with the Bovie from the OR). He packed more Surgicell into the wound, and we applied a full dressing and compression wrap. Mr. Syd Ross did feel lightheaded for a minute upon sitting up, but then was able to leave the wound care center feeling back to baseline. HR had risen from the 70s to the 80s during this whole episode, BP dropped from 187/113 to 157/100. We gave him some instructions on what to do over the weekend in terms of rest, elevation, taking minimal steps on the foot, and what to do in the event of recurrent bleeding. FU on Monday.     Electronically signed by Mendel Patel MD on 8/5/2020 at 4:04 PM

## 2020-08-06 RX ORDER — TIZANIDINE 4 MG/1
TABLET ORAL
Qty: 30 TABLET | Refills: 0 | Status: SHIPPED | OUTPATIENT
Start: 2020-08-06 | End: 2020-08-21

## 2020-08-10 ENCOUNTER — HOSPITAL ENCOUNTER (OUTPATIENT)
Dept: WOUND CARE | Age: 45
Discharge: HOME | End: 2020-08-10
Payer: COMMERCIAL

## 2020-08-10 VITALS
TEMPERATURE: 98.2 F | HEIGHT: 72 IN | WEIGHT: 256 LBS | RESPIRATION RATE: 20 BRPM | SYSTOLIC BLOOD PRESSURE: 158 MMHG | DIASTOLIC BLOOD PRESSURE: 103 MMHG | HEART RATE: 103 BPM | BODY MASS INDEX: 34.67 KG/M2

## 2020-08-10 PROCEDURE — 11042 DBRDMT SUBQ TIS 1ST 20SQCM/<: CPT

## 2020-08-10 RX ORDER — LIDOCAINE 40 MG/G
CREAM TOPICAL ONCE
Status: DISCONTINUED | OUTPATIENT
Start: 2020-08-10 | End: 2020-08-11 | Stop reason: HOSPADM

## 2020-08-10 ASSESSMENT — PAIN SCALES - GENERAL: PAINLEVEL_OUTOF10: 0

## 2020-08-10 NOTE — PLAN OF CARE
Pt to the [facility] for follow up appointment. Wound measuring smaller and was debrided today. Pt to continue with weekly dressing and follow up in the [facility] in 1 week. Discharge instructions reviewed with patient, all questions answered, copy given to patient. Dressings were applied to all wounds per M.D. Instructions at this visit.

## 2020-08-17 ENCOUNTER — HOSPITAL ENCOUNTER (OUTPATIENT)
Dept: WOUND CARE | Age: 45
Discharge: HOME | End: 2020-08-17
Payer: COMMERCIAL

## 2020-08-17 VITALS
HEIGHT: 72 IN | WEIGHT: 256 LBS | SYSTOLIC BLOOD PRESSURE: 141 MMHG | DIASTOLIC BLOOD PRESSURE: 97 MMHG | RESPIRATION RATE: 18 BRPM | HEART RATE: 110 BPM | BODY MASS INDEX: 34.67 KG/M2 | TEMPERATURE: 98.2 F

## 2020-08-17 PROCEDURE — 11042 DBRDMT SUBQ TIS 1ST 20SQCM/<: CPT

## 2020-08-17 RX ORDER — LIDOCAINE 40 MG/G
CREAM TOPICAL ONCE
Status: DISCONTINUED | OUTPATIENT
Start: 2020-08-17 | End: 2020-08-18 | Stop reason: HOSPADM

## 2020-08-17 RX ORDER — GABAPENTIN 400 MG/1
CAPSULE ORAL
Qty: 180 CAPSULE | Refills: 0 | Status: SHIPPED | OUTPATIENT
Start: 2020-08-19 | End: 2020-09-14

## 2020-08-17 RX ORDER — HYDROCODONE BITARTRATE AND ACETAMINOPHEN 5; 325 MG/1; MG/1
1-2 TABLET ORAL EVERY 4 HOURS PRN
Qty: 25 TABLET | Refills: 0 | Status: SHIPPED | OUTPATIENT
Start: 2020-08-17 | End: 2020-08-21 | Stop reason: ALTCHOICE

## 2020-08-17 ASSESSMENT — PAIN DESCRIPTION - PAIN TYPE: TYPE: ACUTE PAIN

## 2020-08-17 ASSESSMENT — PAIN DESCRIPTION - LOCATION: LOCATION: FOOT

## 2020-08-17 ASSESSMENT — PAIN SCALES - GENERAL: PAINLEVEL_OUTOF10: 8

## 2020-08-17 ASSESSMENT — PAIN DESCRIPTION - PROGRESSION: CLINICAL_PROGRESSION: GRADUALLY WORSENING

## 2020-08-17 ASSESSMENT — PAIN - FUNCTIONAL ASSESSMENT: PAIN_FUNCTIONAL_ASSESSMENT: PREVENTS OR INTERFERES SOME ACTIVE ACTIVITIES AND ADLS

## 2020-08-17 ASSESSMENT — PAIN DESCRIPTION - FREQUENCY: FREQUENCY: CONTINUOUS

## 2020-08-17 ASSESSMENT — PAIN DESCRIPTION - ONSET: ONSET: ON-GOING

## 2020-08-17 ASSESSMENT — PAIN DESCRIPTION - DESCRIPTORS: DESCRIPTORS: THROBBING

## 2020-08-17 ASSESSMENT — PAIN DESCRIPTION - ORIENTATION: ORIENTATION: LEFT

## 2020-08-17 NOTE — PROGRESS NOTES
(NEURONTIN) 300 MG capsule Take 2 capsules by mouth 3 times daily for 30 days. 180 capsule 0    cefTRIAXone (ROCEPHIN) 2 g injection Infuse 2 g intravenously every 24 hours x4 weeks per Dr Caryn Rivers      metroNIDAZOLE (FLAGYL) 500 MG tablet Take 500 mg by mouth 4 times daily      losartan (COZAAR) 50 MG tablet TAKE 1 TABLET BY MOUTH DAILY 30 tablet 2    PARoxetine (PAXIL) 10 MG tablet TAKE 1 TABLET BY MOUTH EVERY MORNING 30 tablet 2    gemfibrozil (LOPID) 600 MG tablet Take 1 tablet by mouth 2 times daily (before meals) 60 tablet 3    carvedilol (COREG) 12.5 MG tablet Take 1 tablet by mouth 2 times daily (with meals) 60 tablet 2    glimepiride (AMARYL) 4 MG tablet Take 1 tablet by mouth every morning (before breakfast) 30 tablet 2    sildenafil (VIAGRA) 100 MG tablet Take 1 tablet by mouth as needed for Erectile Dysfunction 15 tablet 0    blood glucose test strips (CONTOUR NEXT TEST) strip USE TO TEST BLOOD SUGAR LEVELS 3 TIMES DAILY 100 strip 11    Lancets MISC Test three times a day with Contour Next EZ machine. DX: E11.9 100 each 11    glucose blood VI test strips (EXACTECH TEST) strip 1 each by In Vitro route daily. As needed. 100 each 0     No current facility-administered medications for this visit.       Facility-Administered Medications Ordered in Other Visits   Medication Dose Route Frequency Provider Last Rate Last Dose    lidocaine (LMX) 4 % cream   Topical Once Mitch Prince DPM           Past Medical History  Past Medical History:   Diagnosis Date    Arthritis     OA    Clostridium difficile infection 11/01/2016    PCR+    Diabetes mellitus ([facility] [address]     TYPE 2- NO MEDS SINCE WEIGHT LOSS    Hyperlipidemia     Hypertension     Medial meniscus tear     LEFT    Osteoarthritis     Type II or unspecified type diabetes mellitus without mention of complication, not stated as uncontrolled        Social History  Social History     Socioeconomic History    Marital status:      Spouse eye exhibits no discharge. Neck: Normal range of motion. Neck supple. Cardiovascular: Normal rate and regular rhythm. Pulmonary/Chest: Effort normal and breath sounds normal.   Musculoskeletal: Normal range of motion. Neurological: He is alert and oriented to person, place, and time. Skin: Skin is warm and dry. He is not diaphoretic. Psychiatric: He has a normal mood and affect. Nursing note and vitals reviewed. Right foot wound not examined, he is going to [facility] after this appt    Assessment/Plan     1. Type 2 diabetes mellitus with diabetic polyneuropathy, with long-term current use of insulin ([facility])  - increase toujeo to 20, call with BG update in 1 week. Small increase in pre prandial humalog, can increase further pending f/u BG  - resume metformin 1000 mg BID. I see no reason why he cannot take this  - insulin glargine (TOUJEO SOLOSTAR) 300 UNIT/ML injection pen; Inject 20 Units into the skin nightly  Dispense: 5 pen; Refill: 3  - insulin lispro (HUMALOG KWIKPEN) 100 UNIT/ML pen; Inject 7 Units into the skin 3 times daily (before meals)  Dispense: 5 pen; Refill: 0  - simvastatin (ZOCOR) 20 MG tablet; TAKE ONE TABLET BY MOUTH ONCE NIGHTLY  Dispense: 30 tablet; Refill: 2  - Insulin Syringe-Needle U-100 (AIMSCO INS SYR .3CC/29GX0.5\") 29G X 1/2\" 0.3 ML MISC; 1 each by Does not apply route daily  Dispense: 100 each; Refill: 3  - metFORMIN (GLUCOPHAGE) 1000 MG tablet; Take 1 tablet by mouth 2 times daily (with meals)  Dispense: 60 tablet; Refill: 2    2. Chronic systolic congestive heart failure ([address]  - with recovered EF  - furosemide (LASIX) 20 MG tablet; Take 1 tablet by mouth daily  Dispense: 30 tablet;  Refill: 2    Madhavi Villagomez PA-C  8/22/2019 [place]

## 2020-08-17 NOTE — PLAN OF CARE
Pt to the [facility] for follow up appointment. Wound improving. Pt complaining of increased pain. Pt given prescription for pain. Pt to continue with weekly dressing. Pt to follow up in the [facility] in 1 week. Discharge instructions reviewed with patient, all questions answered, copy given to patient. Dressings were applied to all wounds per M.D. Instructions at this visit.

## 2020-08-20 NOTE — PROGRESS NOTES
[facility] Progress Note      Jennifer Banerjee     : 1975    DATE OF VISIT:  2020    Subjective:     Jennifer Banerjee is a 45 y.o. male who has a chief complaint of a diabetic ulcer located on the left foot. States mild pain in the arch at times. Mr. Miladis Garvin has a past medical history of Acute osteomyelitis of right hallux ([facility] [address] CHF (congestive heart failure) ([facility] [address] Clostridium difficile infection, Diabetic ulcer of right great toe associated with type 2 diabetes mellitus, with necrosis of bone ([address] Diet-controlled type 2 diabetes mellitus ([facility] [address] Hyperlipidemia, Hypertension, Migraine, Tear of medial meniscus of left knee, and Tobacco use. He has a past surgical history that includes Tonsillectomy; [place] tooth extraction; Knee arthroscopy (Left, [id number]); Toe amputation (Right, 2019); other surgical history (Left, 2020); and Toe amputation (Left, 2020). His family history includes Diabetes in his father and mother; High Blood Pressure in his father, maternal grandmother, maternal uncle, mother, and sister; High Cholesterol in his father, maternal uncle, and mother; Stroke in his father. Mr. Miladis Garvin reports that he quit smoking about 15 years ago. He has a 1.00 pack-year smoking history. He quit smokeless tobacco use about 15 years ago. He reports current alcohol use. He reports that he does not use drugs. His current medication list consists of Aspirin-Acetaminophen-Caffeine, HYDROcodone-acetaminophen, Insulin Syringe-Needle U-100, Lancets, PARoxetine, ammonium lactate, blood glucose test strips, carvedilol, furosemide, gabapentin, glimepiride, insulin glargine, insulin lispro, loratadine, losartan, sildenafil, simvastatin, tiZANidine, and traZODone. Allergies: Januvia [sitagliptin];  Metformin and related; Vancomycin; and Mustard oil [allyl isothiocyanate]    Pertinent items from the review of systems are discussed in the HPI; the remainder of the ROS was reviewed and is negative. Objective:     BP (!) 141/97   Pulse 110   Temp 98.2 °F (36.8 °C) (Oral)   Resp 18   Ht 6' (1.829 m)   Wt 256 lb (116.1 kg)   BMI 34.72 kg/m²   General Appearance: alert and oriented to person, place and time, well developed and well- nourished, in no acute distress  Skin: warm and dry, no rash or erythema  Head: normocephalic and atraumatic  Eyes: pupils equal, round, and reactive to light, extraocular eye movements intact, conjunctivae normal  ENT: tympanic membrane, external ear and ear canal normal bilaterally, nose without deformity, nasal mucosa and turbinates normal without polyps  Neck: supple and non-tender without mass, no thyromegaly or thyroid nodules, no cervical lymphadenopathy  Pulmonary/Chest: clear to auscultation bilaterally- no wheezes, rales or rhonchi, normal air movement, no respiratory distress  Cardiovascular: normal rate, regular rhythm, normal S1 and S2, no murmurs, rubs, clicks, or gallops, distal pulses intact, no carotid bruits  Abdomen: soft, non-tender, non-distended, normal bowel sounds, no masses or organomegaly. Dorsalis pedis pulse left palpable  Posterior tibial pulse left palpable  Dorsalis pedis pulse right palpable  Posterior tibial pulse right palpable        Ulcer on the left hallux amputation site with mild fibrotic tissue, red granulation tissue, mild serous drainage, no hyperkeratotic rim, no undermining, no tunneling, no purulence, no malodor, no eschar,  no periwound maceration, mild periwound erythema, mild edema, no crepitus, no increase in skin temperature, ulcer probes to bone of the 1st MH which appears healthy    Today's ulcer measurements are in the wound documentation flowsheet.      Wound measurements:  Wound 07/31/20 Proximal #1, Left Great toe amp site, DFU, Reyna 4, onset 7/24/20-Wound Length (cm): 6.3 cm    Wound 07/31/20 Proximal #1, Left Great toe amp site, DFU, Reyna 4, onset 7/24/20-Wound Width (cm): 2.9 cm    Wound 07/31/20 Proximal #1, Left Great toe amp site, DFU, Reyna 4, onset 7/24/20-Wound Depth (cm): 1.6 cm    LABS  Lab Results   Component Value Date    LABA1C 11.7 07/24/2020         Assessment:     Patient Active Problem List   Diagnosis Code    Hypertension I10    Diabetes mellitus ([facility] [address] E11.9    Cardiomyopathy ([facility] I42.9    Mixed hyperlipidemia E78.2    Diabetic ulcer of toe of left foot associated with type 2 diabetes mellitus, with necrosis of bone ([facility]) E11.621, L97.524    Non compliance w medication regimen Z91.14    Toe osteomyelitis, left ([facility]) M86.9    Gangrene of foot ([facility] I96    Sepsis due to group B Streptococcus ([facility]) A40.1    Cellulitis of left foot L03. 116    Allergic rhinitis J30.9    Arthritis M19.90    Osteoarthritis M19.90    Septicemia ([facility] A41.9       Assessment of today's active condition(s): diabetic foot ulcer left hallux amputation, diabetes mellitus. Factors contributing to occurrence and/or persistence of the chronic ulcer include diabetes and poor glucose control. Sharp debridement is indicated today, based upon the exam findings in the ulcer(s) above. Procedure note:     Consent obtained. Time out taken. Anesthetic  Anesthetic:  (post debridement)     After application of topical 4% lidocaine plain to the ulcer, the ulcer was debrided of all fibrotic, necrotic, hyperkeratotic tissue, nonviable and viable tissue through the subcutaneous tissue. This excised skin and subcutaneous tissue with a scalpel. Total Surface Area Debrided:  5 sq cm. The ulcer(s) were then irrigated with normal saline solution. The procedure was completed with a small amount of bleeding, and hemostasis was by pressure. The patient tolerated the procedure well, with no significant complications. The patient's level of pain during and after the procedure was monitored, and is noted in the wound documentation flowsheet.          Discharge plan: Treatment in the wound care center today: Wound 07/31/20 Proximal #1, Left Great toe amp site, DFU, Reyna 4, onset 7/24/20-Dressing/Treatment: Other (comment)(Betadine,OpticellAg rope,4x4's,ABD,kerlix,coban). Home treatment: good handwashing before and after any dressing changes. Cleanse ulcer with saline or soap & water before dressing change. May use Vaseline (petrolatum), Aquaphor, Aveeno, CeraVe, Cetaphil, Eucerin, Lubriderm, etc for dry skin. Dressing type for home: Silver alginate and dry dressing applied. Written discharge instructions given to patient. Offload ulcer(s) as directed. Elevate leg(s) as directed. Follow up in 1 week. Discussed in room with Dr. Sergey Peralta. Needs to control glucose levels with Dr. Sergey Peralta.            Electronically signed by Rosa Isela Hill DPM on 8/20/2020 at 4:49 PM.

## 2020-08-20 NOTE — PROGRESS NOTES
88 [facility] Progress Note      Samantha Catherine     : 1975    DATE OF VISIT:  8/3/2020    Subjective:     Samantha Catherine is a 45 y.o. male who has a chief complaint of a diabetic ulcer located on the left foot. States some pain in the foot but is controlled. Had large amount of bleeding from wound last week that required electrocautery. Mr. Jennifer Garcia has a past medical history of Acute osteomyelitis of right hallux ([facility] [address] CHF (congestive heart failure) ([facility] [address] Clostridium difficile infection, Diabetic ulcer of right great toe associated with type 2 diabetes mellitus, with necrosis of bone ([facility] [address] Diet-controlled type 2 diabetes mellitus ([facility] [address] Hyperlipidemia, Hypertension, Migraine, Tear of medial meniscus of left knee, and Tobacco use. He has a past surgical history that includes Tonsillectomy; [place] tooth extraction; Knee arthroscopy (Left, [id number]); Toe amputation (Right, 2019); other surgical history (Left, 2020); and Toe amputation (Left, 2020). His family history includes Diabetes in his father and mother; High Blood Pressure in his father, maternal grandmother, maternal uncle, mother, and sister; High Cholesterol in his father, maternal uncle, and mother; Stroke in his father. Mr. Jennifer Garcia reports that he quit smoking about 15 years ago. He has a 1.00 pack-year smoking history. He quit smokeless tobacco use about 15 years ago. He reports current alcohol use. He reports that he does not use drugs. His current medication list consists of Aspirin-Acetaminophen-Caffeine, HYDROcodone-acetaminophen, Insulin Syringe-Needle U-100, Lancets, PARoxetine, ammonium lactate, blood glucose test strips, carvedilol, furosemide, gabapentin, glimepiride, insulin glargine, insulin lispro, loratadine, losartan, sildenafil, simvastatin, tiZANidine, and traZODone. Allergies: Januvia [sitagliptin];  Metformin and related; Vancomycin; and Mustard oil (cm): 6.5 cm    Wound 07/31/20 Proximal #1, Left Great toe amp site, DFU, Reyna 4, onset 7/24/20-Wound Width (cm): 3.2 cm    Wound 07/31/20 Proximal #1, Left Great toe amp site, DFU, Reyna 4, onset 7/24/20-Wound Depth (cm): 3.5 cm    LABS  Lab Results   Component Value Date    LABA1C 11.7 07/24/2020         Assessment:     Patient Active Problem List   Diagnosis Code    Hypertension I10    Diabetes mellitus ([facility] [address] E11.9    Cardiomyopathy ([facility] [address] I42.9    Mixed hyperlipidemia E78.2    Diabetic ulcer of toe of left foot associated with type 2 diabetes mellitus, with necrosis of bone ([facility]) E11.621, L97.524    Non compliance w medication regimen Z91.14    Toe osteomyelitis, left ([facility]) M86.9    Gangrene of foot ([facility] [address] I96    Sepsis due to group B Streptococcus ([facility]) A40.1    Cellulitis of left foot L03. 116    Allergic rhinitis J30.9    Arthritis M19.90    Osteoarthritis M19.90    Septicemia ([facility] [address] A41.9       Assessment of today's active condition(s): diabetic foot ulcer left hallux amputation, diabetes mellitus. Factors contributing to occurrence and/or persistence of the chronic ulcer include diabetes and poor glucose control. Sharp debridement is not indicated today, based upon the exam findings in the ulcer(s) above. Discharge plan:     Treatment in the wound care center today: Wound 07/31/20 Proximal #1, Left Great toe amp site, DFU, Reyna 4, onset 7/24/20-Dressing/Treatment: Other (comment)(OpticellAg rope,4x4's,ABD,kerlix,coban-no compression). Home treatment: good handwashing before and after any dressing changes. Cleanse ulcer with saline or soap & water before dressing change. May use Vaseline (petrolatum), Aquaphor, Aveeno, CeraVe, Cetaphil, Eucerin, Lubriderm, etc for dry skin. Dressing type for home: Silver alginate and dry dressing applied. Written discharge instructions given to patient. Offload ulcer(s) as directed. Elevate leg(s) as directed. Follow up in 1 week. Discussed in room with Dr. Yannick Conner. Needs to control glucose levels with Dr. Yannick Conner. Did not debride this week given large amount of bleeding last week when seen by Dr. Yannick Conner.          Electronically signed by Becky Palma DPM on 8/20/2020 at 4:06 PM.

## 2020-08-20 NOTE — PROGRESS NOTES
88 [facility] Progress Note      Prisca Crowe     : 1975    DATE OF VISIT:  8/10/2020    Subjective:     Prisca Crowe is a 45 y.o. male who has a chief complaint of a diabetic ulcer located on the left foot. States mild pain in the arch at times. Mr. Dony Scott has a past medical history of Acute osteomyelitis of right hallux ([facility] [address] CHF (congestive heart failure) ([facility] [address] Clostridium difficile infection, Diabetic ulcer of right great toe associated with type 2 diabetes mellitus, with necrosis of bone ([address] Diet-controlled type 2 diabetes mellitus ([facility] [address] Hyperlipidemia, Hypertension, Migraine, Tear of medial meniscus of left knee, and Tobacco use. He has a past surgical history that includes Tonsillectomy; [place] tooth extraction; Knee arthroscopy (Left, [id number]); Toe amputation (Right, 2019); other surgical history (Left, 2020); and Toe amputation (Left, 2020). His family history includes Diabetes in his father and mother; High Blood Pressure in his father, maternal grandmother, maternal uncle, mother, and sister; High Cholesterol in his father, maternal uncle, and mother; Stroke in his father. Mr. Dony Scott reports that he quit smoking about 15 years ago. He has a 1.00 pack-year smoking history. He quit smokeless tobacco use about 15 years ago. He reports current alcohol use. He reports that he does not use drugs. His current medication list consists of Aspirin-Acetaminophen-Caffeine, HYDROcodone-acetaminophen, Insulin Syringe-Needle U-100, Lancets, PARoxetine, ammonium lactate, blood glucose test strips, carvedilol, furosemide, gabapentin, glimepiride, insulin glargine, insulin lispro, loratadine, losartan, sildenafil, simvastatin, tiZANidine, and traZODone. Allergies: Januvia [sitagliptin];  Metformin and related; Vancomycin; and Mustard oil [allyl isothiocyanate]    Pertinent items from the review of systems are discussed in the HPI; the remainder of the ROS was reviewed and is negative. Objective:     BP (!) 158/103   Pulse 103   Temp 98.2 °F (36.8 °C) (Oral)   Resp 20   Ht 6' (1.829 m)   Wt 256 lb (116.1 kg)   BMI 34.72 kg/m²   General Appearance: alert and oriented to person, place and time, well developed and well- nourished, in no acute distress  Skin: warm and dry, no rash or erythema  Head: normocephalic and atraumatic  Eyes: pupils equal, round, and reactive to light, extraocular eye movements intact, conjunctivae normal  ENT: tympanic membrane, external ear and ear canal normal bilaterally, nose without deformity, nasal mucosa and turbinates normal without polyps  Neck: supple and non-tender without mass, no thyromegaly or thyroid nodules, no cervical lymphadenopathy  Pulmonary/Chest: clear to auscultation bilaterally- no wheezes, rales or rhonchi, normal air movement, no respiratory distress  Cardiovascular: normal rate, regular rhythm, normal S1 and S2, no murmurs, rubs, clicks, or gallops, distal pulses intact, no carotid bruits  Abdomen: soft, non-tender, non-distended, normal bowel sounds, no masses or organomegaly. Dorsalis pedis pulse left palpable  Posterior tibial pulse left palpable  Dorsalis pedis pulse right palpable  Posterior tibial pulse right palpable        Ulcer on the left hallux amputation site with mild fibrotic tissue, red granulation tissue, mild serous drainage, no hyperkeratotic rim, no undermining, no tunneling, no purulence, no malodor, no eschar,  no periwound maceration, mild periwound erythema, mild edema, no crepitus, no increase in skin temperature, ulcer probes to bone of the 1st MH which appears healthy    Today's ulcer measurements are in the wound documentation flowsheet.      Wound measurements:  Wound 07/31/20 Proximal #1, Left Great toe amp site, DFU, Reyna 4, onset 7/24/20-Wound Length (cm): 6.5 cm    Wound 07/31/20 Proximal #1, Left Great toe amp site, DFU, Reyna 4, onset 7/24/20-Wound Width (cm): 3 cm    Wound 07/31/20 Proximal #1, Left Great toe amp site, DFU, Reyna 4, onset 7/24/20-Wound Depth (cm): 1.5 cm    LABS  Lab Results   Component Value Date    LABA1C 11.7 07/24/2020         Assessment:     Patient Active Problem List   Diagnosis Code    Hypertension I10    Diabetes mellitus ([facility] [address] E11.9    Cardiomyopathy ([facility] [address] I42.9    Mixed hyperlipidemia E78.2    Diabetic ulcer of toe of left foot associated with type 2 diabetes mellitus, with necrosis of bone ([facility]) E11.621, L97.524    Non compliance w medication regimen Z91.14    Toe osteomyelitis, left ([facility]) M86.9    Gangrene of foot ([facility] [address] I96    Sepsis due to group B Streptococcus ([facility]) A40.1    Cellulitis of left foot L03. 116    Allergic rhinitis J30.9    Arthritis M19.90    Osteoarthritis M19.90    [facility] A41.9       Assessment of today's active condition(s): diabetic foot ulcer left hallux amputation, diabetes mellitus. Factors contributing to occurrence and/or persistence of the chronic ulcer include diabetes and poor glucose control. Sharp debridement is indicated today, based upon the exam findings in the ulcer(s) above. Procedure note:     Consent obtained. Time out taken. Anesthetic  Anesthetic:  (post debridement)     After application of topical 4% lidocaine plain to the ulcer, the ulcer was debrided of all fibrotic, necrotic, hyperkeratotic tissue, nonviable and viable tissue through the subcutaneous tissue. This excised skin and subcutaneous tissue with a scalpel. Total Surface Area Debrided:  2 sq cm. The ulcer(s) were then irrigated with normal saline solution. The procedure was completed with a small amount of bleeding, and hemostasis was by pressure. The patient tolerated the procedure well, with no significant complications. The patient's level of pain during and after the procedure was monitored, and is noted in the wound documentation flowsheet.          Discharge plan: Treatment in the wound care center today: Wound 07/31/20 Proximal #1, Left Great toe amp site, DFU, Reyna 4, onset 7/24/20-Dressing/Treatment: Other (comment)(OpticellAg rope,4x4's,ABD,kerlix,coban-no compression). Home treatment: good handwashing before and after any dressing changes. Cleanse ulcer with saline or soap & water before dressing change. May use Vaseline (petrolatum), Aquaphor, Aveeno, CeraVe, Cetaphil, Eucerin, Lubriderm, etc for dry skin. Dressing type for home: Silver alginate and dry dressing applied. Written discharge instructions given to patient. Offload ulcer(s) as directed. Elevate leg(s) as directed. Follow up in 1 week. Discussed in room with Dr. Geovanna Delaney. Needs to control glucose levels with Dr. Geovanna Delaney. Most lateral aspect was carefully debrided given recent arterial bleed in that region.          Electronically signed by Dc Farfan DPM on 8/20/2020 at 4:33 PM.

## 2020-08-21 ENCOUNTER — OFFICE VISIT (OUTPATIENT)
Dept: INTERNAL MEDICINE CLINIC | Age: 45
End: 2020-08-21

## 2020-08-21 VITALS
DIASTOLIC BLOOD PRESSURE: 65 MMHG | HEART RATE: 85 BPM | RESPIRATION RATE: 18 BRPM | BODY MASS INDEX: 35.49 KG/M2 | WEIGHT: 262 LBS | SYSTOLIC BLOOD PRESSURE: 135 MMHG | HEIGHT: 72 IN

## 2020-08-21 PROBLEM — Z91.148 NON COMPLIANCE W MEDICATION REGIMEN: Status: RESOLVED | Noted: 2020-03-02 | Resolved: 2020-08-21

## 2020-08-21 PROBLEM — L03.116 CELLULITIS OF LEFT FOOT: Status: RESOLVED | Noted: 2020-07-26 | Resolved: 2020-08-21

## 2020-08-21 PROBLEM — Z91.14 NON COMPLIANCE W MEDICATION REGIMEN: Status: RESOLVED | Noted: 2020-03-02 | Resolved: 2020-08-21

## 2020-08-21 PROCEDURE — 1111F DSCHRG MED/CURRENT MED MERGE: CPT | Performed by: INTERNAL MEDICINE

## 2020-08-21 PROCEDURE — 3046F HEMOGLOBIN A1C LEVEL >9.0%: CPT | Performed by: INTERNAL MEDICINE

## 2020-08-21 PROCEDURE — 99213 OFFICE O/P EST LOW 20 MIN: CPT | Performed by: INTERNAL MEDICINE

## 2020-08-21 PROCEDURE — G8417 CALC BMI ABV UP PARAM F/U: HCPCS | Performed by: INTERNAL MEDICINE

## 2020-08-21 PROCEDURE — G8427 DOCREV CUR MEDS BY ELIG CLIN: HCPCS | Performed by: INTERNAL MEDICINE

## 2020-08-21 PROCEDURE — 2022F DILAT RTA XM EVC RTNOPTHY: CPT | Performed by: INTERNAL MEDICINE

## 2020-08-21 PROCEDURE — 1036F TOBACCO NON-USER: CPT | Performed by: INTERNAL MEDICINE

## 2020-08-21 RX ORDER — INSULIN GLARGINE 300 U/ML
75 INJECTION, SOLUTION SUBCUTANEOUS NIGHTLY
Qty: 5 PEN | Refills: 3 | Status: SHIPPED | OUTPATIENT
Start: 2020-08-21 | End: 2020-11-10 | Stop reason: SDUPTHER

## 2020-08-21 RX ORDER — TRAZODONE HYDROCHLORIDE 50 MG/1
50 TABLET ORAL NIGHTLY
Qty: 90 TABLET | Refills: 1 | Status: SHIPPED | OUTPATIENT
Start: 2020-08-21 | End: 2020-11-10 | Stop reason: SDUPTHER

## 2020-08-21 RX ORDER — TIZANIDINE 4 MG/1
8 TABLET ORAL NIGHTLY
Qty: 180 TABLET | Refills: 0 | Status: SHIPPED | OUTPATIENT
Start: 2020-08-21 | End: 2020-11-10 | Stop reason: SDUPTHER

## 2020-08-21 RX ORDER — INSULIN LISPRO 100 [IU]/ML
20 INJECTION, SOLUTION INTRAVENOUS; SUBCUTANEOUS
Qty: 5 PEN | Refills: 0 | Status: SHIPPED | OUTPATIENT
Start: 2020-08-21 | End: 2020-08-24

## 2020-08-21 NOTE — PROGRESS NOTES
Post-Discharge Transitional Care Management Services or Hospital Follow Up      Beny Good   YOB: 1975    Date of Office Visit:  8/21/2020  Date of Hospital Admission: 7/24/20  Date of Hospital Discharge: 7/28/20  Readmission Risk Score(high >=14%. Medium >=10%):Readmission Risk Score: 13      Care management risk score Rising risk (score 2-5) and Complex Care (Scores >=6): 6     Non face to face  following discharge, date last encounter closed (first attempt may have been earlier): *No documented post hospital discharge outreach found in the last 14 days *No documented post hospital discharge outreach found in the last 14 days    Call initiated 2 business days of discharge: *No response recorded in the last 14 days           Patient Active Problem List   Diagnosis    Hypertension    Diabetes mellitus ([address]    Cardiomyopathy ([address]    Mixed hyperlipidemia    Diabetic ulcer of toe of left foot associated with type 2 diabetes mellitus, with necrosis of bone ([address]    Toe osteomyelitis, left (HCC)    Allergic rhinitis    Arthritis    Osteoarthritis       Allergies   Allergen Reactions    Januvia [Sitagliptin] Nausea Only     Has taken metformin without side effects in the past.  Nausea with Janumet in the past.     Metformin And Related      GI Upset    Vancomycin      Pt had red face, swelling itching of eyelids, sore throat after receiving vancmomyin and cefepime. I think this was a histamine releasing reaction from vancomycin most likely. The cefepime was switched to Zosyn and patient had no reaction to Zosyn    Mustard [employer] Isothiocyanate] Swelling and Rash       Medications listed as ordered at the time of discharge from hospital   [address] Medication Instructions CEE:    Printed on:08/23/20 2014   Medication Information                      ammonium lactate (LAC-HYDRIN) 12 % cream  Apply topically 2x daily.              Aspirin-Acetaminophen-Caffeine (EXCEDRIN MIGRAINE PO)  Take by mouth as needed             blood glucose test strips (CONTOUR NEXT TEST) strip  USE TO TEST BLOOD SUGAR LEVELS 3 TIMES DAILY             carvedilol (COREG) 12.5 MG tablet  Take 1 tablet by mouth 2 times daily (with meals)             furosemide (LASIX) 20 MG tablet  Take 1 tablet by mouth daily             gabapentin (NEURONTIN) 400 MG capsule  TAKE 2 CAPSULES BY MOUTH THREE TIMES DAILY. glimepiride (AMARYL) 4 MG tablet  Take 1 tablet by mouth every morning (before breakfast)             glucose blood VI test strips (EXACTECH TEST) strip  1 each by In Vitro route daily. As needed. Insulin Glargine, 1 Unit Dial, (TOUJEO SOLOSTAR) 300 UNIT/ML SOPN  Inject 75 Units into the skin nightly             insulin lispro (HUMALOG KWIKPEN) 100 UNIT/ML pen  Inject 15 Units into the skin 3 times daily (before meals)             insulin lispro, 1 Unit Dial, (HUMALOG KWIKPEN) 100 UNIT/ML SOPN  Inject 20 Units into the skin 3 times daily (before meals)             Insulin Syringe-Needle U-100 (nScaledCO INS SYR .3CC/29GX0.5\") 29G X 1/2\" 0.3 ML MISC  1 each by Does not apply route daily             Lancets MISC  Test three times a day with Contour Next EZ machine.   DX: E11.9             loratadine (CLARITIN) 10 MG tablet  Take 10 mg by mouth nightly             losartan (COZAAR) 50 MG tablet  TAKE 1 TABLET BY MOUTH DAILY             PARoxetine (PAXIL) 10 MG tablet  Take 1 tablet by mouth every morning             sildenafil (VIAGRA) 100 MG tablet  TAKE 1 TABLET BY MOUTH AS NEEDED FOR ERECTILE DYSFUNCTION             simvastatin (ZOCOR) 20 MG tablet  TAKE 1 TABLET BY MOUTH NIGHTLY             tiZANidine (ZANAFLEX) 4 MG tablet  Take 2 tablets by mouth nightly             traZODone (DESYREL) 50 MG tablet  Take 1 tablet by mouth nightly                   Medications marked \"taking\" at this time  Outpatient Medications Marked as Taking for the 8/21/20 encounter (Office Visit) with Janice Corral Tricia Ortiz MD   Medication Sig Dispense Refill    insulin lispro, 1 Unit Dial, (HUMALOG KWIKPEN) 100 UNIT/ML SOPN Inject 20 Units into the skin 3 times daily (before meals) 5 pen 0    traZODone (DESYREL) 50 MG tablet Take 1 tablet by mouth nightly 90 tablet 1    Insulin Glargine, 1 Unit Dial, (TOUJEO SOLOSTAR) 300 UNIT/ML SOPN Inject 75 Units into the skin nightly 5 pen 3    tiZANidine (ZANAFLEX) 4 MG tablet Take 2 tablets by mouth nightly 180 tablet 0        Medications patient taking as of now reconciled against medications ordered at time of hospital discharge: Yes    Chief Complaint   Patient presents with   [address] from Hospital       HPI      45 y.o. Male with known h.o DM-2, HTN, cardiomopathy, CHF, neuropathy here for recent hospital dc f.w     Was admitted last month for left great toe  infection, s.p partial foot amputation and was treated with IV abx  Improved and sent home  Now ongoing wound care by Dr. Ayala Orozco and Mateo   Reports his wound is open and is using limited activity to prevent from worsening        DM- 2 IDDM with complications- neuropathy and foot ulcer   Since he developed right great toe ulcer needing amputation earlier this year and now left great osteomyelitis with amputation 7/20     He has been very careful about sugars and monitoring it 3 times daily and was compliant with insulin for few months  Sugars still high In am upto 200 and 250 despite increasing toujeo  No low sugars  Reports neuropathy symptoms getting worse       Has h.o non ischemic cardiomyopathy . EF noted low at 20-25 % (2014)and treated with diuresis , b blockers, digoxin  , improved EF on f.w [facility]  Able to work  Normally with no residual dyspnea or pedal edema. Back to work  No chest pain or nocturnal cough-   Currently on coreg and losartan and lasix  only. Off digoxin. Aldactone         Has severe Neuropathy in extremities with symptoms of tingling and numbness in hands . Palms are hypersensitive.   Now on neurontin tid, off tramadol and pain meds. Unable to afford lyrica. Anxiety improved on paxil     HTN - recently office readings remain stable with meds   Being compliant          Inpatient course: Discharge summary reviewed- see chart. Interval history/Current status: home     Review of Systems   As above    Vitals:    08/21/20 1511   BP: 135/65   Pulse: 85   Resp: 18   Weight: 262 lb (118.8 kg)   Height: 6' (1.829 m)     Body mass index is 35.53 kg/m². Wt Readings from Last 3 Encounters:   08/21/20 262 lb (118.8 kg)   08/17/20 256 lb (116.1 kg)   08/10/20 256 lb (116.1 kg)     BP Readings from Last 3 Encounters:   08/21/20 135/65   08/17/20 (!) 141/97   08/10/20 (!) 158/103       Physical Exam      Vitals:    08/21/20 1511   BP: 135/65   Pulse: 85   Resp: 18         General:  Awake, alert and oriented. Appears to be not in any distress  Mucous Membranes:  Pink , anicteric  Neck: No JVD, no carotid bruit, no thyromegaly  Chest:  Clear to auscultation bilaterally, no added sounds  Cardiovascular:  RRR S1S2 heard, no murmurs or gallops  Abdomen:  Soft, undistended, non tender, no organomegaly, BS present  Extremities: No edema or cyanosis. Distal pulses well felt  Neurological : grossly normal      Assessment/Plan:   Diagnosis Orders   1. Diabetic peripheral neuropathy associated with type 2 diabetes mellitus ([address]     2. Type 2 diabetes mellitus with diabetic polyneuropathy, with long-term current use of insulin ([address]     3. Diabetes mellitus type 2, uncontrolled, with complications ([address]  [place] DISCHARGE MEDS RECONCILED W/ CURRENT OUTPATIENT MED LIST   4. Neuropathic diabetic ulcer of foot ([address]  [place] DISCHARGE MEDS RECONCILED W/ CURRENT OUTPATIENT MED LIST   5. Mixed hyperlipidemia     6.  Essential hypertension  [place] DISCHARGE MEDS RECONCILED W/ CURRENT OUTPATIENT MED LIST         DM- 2 chronically uncontrolled with symptoms and complications  - peripheral neuropathy, ED , skin ulcers  - s/p recent right great toe amputation and now had left great to amptuation     - continue aggressive glucose control   - non compliant with diet and insulin for last few months   - resume  toujeo 65 at night, humalog 15- 18 units tid   - diabetic diet   - continue losartan and statins, off gemfibrozil for side effects   Need eye exam this year      HTN - recently improved with being compliant  -coreg, losartan     Hyperlipidemia - severely elevated TGL.   on statins,- need recheck    Cardiomyopathy - with low EF 2014, thought to be viral - resolved 2015 with improved EF     Peripheral neuropathy - from uncontrolled DM  - continue neurontin   - off pain mx    Right palmar wound - healing with minimal cellulitis  Daily wound care, add levaquin x 5 days    Depression - stable on paxil    ED - prn viagra           Medical Decision Making: moderate complexity

## 2020-08-24 ENCOUNTER — HOSPITAL ENCOUNTER (OUTPATIENT)
Dept: WOUND CARE | Age: 45
Discharge: HOME | End: 2020-08-24
Payer: COMMERCIAL

## 2020-08-24 VITALS
HEIGHT: 72 IN | BODY MASS INDEX: 35.43 KG/M2 | TEMPERATURE: 98.1 F | RESPIRATION RATE: 20 BRPM | DIASTOLIC BLOOD PRESSURE: 63 MMHG | HEART RATE: 85 BPM | WEIGHT: 261.6 LBS | SYSTOLIC BLOOD PRESSURE: 96 MMHG

## 2020-08-24 PROCEDURE — 11042 DBRDMT SUBQ TIS 1ST 20SQCM/<: CPT

## 2020-08-24 RX ORDER — LIDOCAINE 40 MG/G
CREAM TOPICAL ONCE
Status: DISCONTINUED | OUTPATIENT
Start: 2020-08-24 | End: 2020-08-25 | Stop reason: HOSPADM

## 2020-08-24 ASSESSMENT — PAIN SCALES - GENERAL
PAINLEVEL_OUTOF10: 0
PAINLEVEL_OUTOF10: 0

## 2020-08-24 NOTE — PLAN OF CARE
Pt to the [facility] for follow up appointment. Wound debrided today per Dr Celena Moran. Wound improving. Pt to continue with weekly dressing. Pt to follow up in the [facility] in 1 week. Discharge instructions reviewed with patient, all questions answered, copy given to patient. Dressings were applied to all wounds per M.D. Instructions at this visit. Dr Yannick Conner discussed HBOT with patient.

## 2020-08-24 NOTE — PROGRESS NOTES
[facility] Progress Note      Kelli Woodward     : 1975    DATE OF VISIT:  2020    Subjective:     Kelli Woodward is a 45 y.o. male who has a chief complaint of a diabetic ulcer located on the left foot. States mild pain in the arch at times but is improving. Mr. Amanuel Treadwell has a past medical history of Acute osteomyelitis of right hallux ([facility] CHF (congestive heart failure) ([facility] Clostridium difficile infection, Diabetic ulcer of right great toe associated with type 2 diabetes mellitus, with necrosis of bone ([facility] Diet-controlled type 2 diabetes mellitus ([facility] Hyperlipidemia, Hypertension, Migraine, Tear of medial meniscus of left knee, and Tobacco use. He has a past surgical history that includes Tonsillectomy; [place] tooth extraction; Knee arthroscopy (Left, [id number]); Toe amputation (Right, 2019); other surgical history (Left, 2020); and Toe amputation (Left, 2020). His family history includes Diabetes in his father and mother; High Blood Pressure in his father, maternal grandmother, maternal uncle, mother, and sister; High Cholesterol in his father, maternal uncle, and mother; Stroke in his father. Mr. Amanuel Treadwell reports that he quit smoking about 15 years ago. He has a 1.00 pack-year smoking history. He quit smokeless tobacco use about 15 years ago. He reports current alcohol use. He reports that he does not use drugs. His current medication list consists of Aspirin-Acetaminophen-Caffeine, HYDROcodone-Acetaminophen, Insulin Glargine (1 Unit Dial), Insulin Syringe-Needle U-100, Lancets, PARoxetine, ammonium lactate, blood glucose test strips, carvedilol, furosemide, gabapentin, glimepiride, insulin lispro, loratadine, losartan, sildenafil, simvastatin, tiZANidine, and traZODone. Allergies: Januvia [sitagliptin];  Metformin and related; Vancomycin; and Mustard oil [allyl isothiocyanate]    Pertinent items from the review of systems are discussed in the HPI; the remainder of the ROS was reviewed and is negative. Objective:     BP 96/63 Comment: asymptomatic  Pulse 85   Temp 98.1 °F (36.7 °C) (Oral)   Resp 20   Ht 6' (1.829 m)   Wt 261 lb 9.6 oz (118.7 kg)   BMI 35.48 kg/m²   General Appearance: alert and oriented to person, place and time, well developed and well- nourished, in no acute distress  Skin: warm and dry, no rash or erythema  Head: normocephalic and atraumatic  Eyes: pupils equal, round, and reactive to light, extraocular eye movements intact, conjunctivae normal  ENT: tympanic membrane, external ear and ear canal normal bilaterally, nose without deformity, nasal mucosa and turbinates normal without polyps  Neck: supple and non-tender without mass, no thyromegaly or thyroid nodules, no cervical lymphadenopathy  Pulmonary/Chest: clear to auscultation bilaterally- no wheezes, rales or rhonchi, normal air movement, no respiratory distress  Cardiovascular: normal rate, regular rhythm, normal S1 and S2, no murmurs, rubs, clicks, or gallops, distal pulses intact, no carotid bruits  Abdomen: soft, non-tender, non-distended, normal bowel sounds, no masses or organomegaly. Dorsalis pedis pulse left palpable  Posterior tibial pulse left palpable  Dorsalis pedis pulse right palpable  Posterior tibial pulse right palpable        Ulcer on the left hallux amputation site with mild fibrotic tissue, red granulation tissue, mild serous drainage, no hyperkeratotic rim, no undermining, no tunneling, no purulence, no malodor, no eschar,no periwound maceration, mild periwound erythema, mild edema, no crepitus, no increase in skin temperature, ulcer probes to bone at distal aspect of the 1st MH which appears healthy    Today's ulcer measurements are in the wound documentation flowsheet.      Wound measurements:  Wound 07/31/20 Proximal #1, Left Great toe amp site, DFU, Reyna 4, onset 7/24/20-Wound Length (cm): 6 cm    Wound 07/31/20 Proximal #1, Left Great toe amp site, DFU, Reyna 4, onset 7/24/20-Wound Width (cm): 2.8 cm    Wound 07/31/20 Proximal #1, Left Great toe amp site, DFU, Reyna 4, onset 7/24/20-Wound Depth (cm): 2.8 cm    LABS  Lab Results   Component Value Date    LABA1C 11.7 07/24/2020         Assessment:     Patient Active Problem List   Diagnosis Code    Hypertension I10    Diabetes mellitus ([facility] E11.9    Cardiomyopathy ([facility] I42.9    Mixed hyperlipidemia E78.2    Diabetic ulcer of toe of left foot associated with type 2 diabetes mellitus, with necrosis of bone (HCC) E11.621, L97.524    Toe osteomyelitis, left (HCC) M86.9    Allergic rhinitis J30.9    Arthritis M19.90    Osteoarthritis M19.90       Assessment of today's active condition(s): diabetic foot ulcer left hallux amputation, diabetes mellitus. Factors contributing to occurrence and/or persistence of the chronic ulcer include diabetes and poor glucose control. Sharp debridement is indicated today, based upon the exam findings in the ulcer(s) above. Procedure note:     Consent obtained. Time out taken. Anesthetic  Anesthetic:  (post debridement)     After application of topical 4% lidocaine plain to the ulcer, the ulcer was debrided of all fibrotic, necrotic, hyperkeratotic tissue, nonviable and viable tissue through the subcutaneous tissue. This excised skin and subcutaneous tissue with a scalpel. Total Surface Area Debrided:  5 sq cm. The ulcer(s) were then irrigated with normal saline solution. The procedure was completed with a small amount of bleeding, and hemostasis was by pressure. The patient tolerated the procedure well, with no significant complications. The patient's level of pain during and after the procedure was monitored, and is noted in the wound documentation flowsheet.          Discharge plan:     Treatment in the wound care center today: Wound 07/31/20 Proximal #1, Left Great toe amp site, DFU, Reyna 4, onset 7/24/20-Dressing/Treatment: (Betadine,Opticell Ag,gauze,ABD,Kerlix,Coban-no compression). Home treatment: good handwashing before and after any dressing changes. Cleanse ulcer with saline or soap & water before dressing change. May use Vaseline (petrolatum), Aquaphor, Aveeno, CeraVe, Cetaphil, Eucerin, Lubriderm, etc for dry skin. Dressing type for home: Silver alginate and dry dressing applied. Written discharge instructions given to patient. Offload ulcer(s) as directed. Elevate leg(s) as directed. Follow up in 1 week. Discussed in room with Dr. Bogdan Harry. Needs to control glucose levels with Dr. Bogdan Harry. Dr. Bogdan Harry discussed potential HBO in future as well.            Electronically signed by Rosa Whitehead DPM on 8/24/2020 at 4:32 PM.

## 2020-08-25 NOTE — PROGRESS NOTES
Brief HBO note --    I saw . Elma Fox today with Dr. Yoav Lawson, to recommend adjunctive HBO therapy for his Budd Radar 4 diabetic foot. We went over some of the basics (rationale, scheduling, some of the basics of risk-benefit), but he declined at this time, citing an inability to commit to the time needed for therapy, because of work obligations. If healing stalls or his foot regresses in any way, he will reconsider.      Electronically signed by Young Telles MD on 8/25/2020 at 9:04 AM

## 2020-08-31 ENCOUNTER — HOSPITAL ENCOUNTER (OUTPATIENT)
Dept: WOUND CARE | Age: 45
Discharge: HOME | End: 2020-08-31
Payer: COMMERCIAL

## 2020-08-31 VITALS
SYSTOLIC BLOOD PRESSURE: 119 MMHG | WEIGHT: 259 LBS | RESPIRATION RATE: 20 BRPM | BODY MASS INDEX: 35.08 KG/M2 | HEIGHT: 72 IN | DIASTOLIC BLOOD PRESSURE: 83 MMHG | HEART RATE: 84 BPM | TEMPERATURE: 98.2 F

## 2020-08-31 PROCEDURE — 11042 DBRDMT SUBQ TIS 1ST 20SQCM/<: CPT

## 2020-08-31 RX ORDER — LIDOCAINE 40 MG/G
CREAM TOPICAL ONCE
Status: DISCONTINUED | OUTPATIENT
Start: 2020-08-31 | End: 2020-09-01 | Stop reason: HOSPADM

## 2020-08-31 ASSESSMENT — PAIN SCALES - GENERAL: PAINLEVEL_OUTOF10: 0

## 2020-08-31 NOTE — PLAN OF CARE
Pt to the [address] [place] [address],[address] for follow up appointment. Wound measuring smaller and was debrided today per Dr Hunter Hernandez. Pt to continue with weekly dressing. Pt to follow up with Dr Lei Every in 1 week and Dr Hunter Hernandez in 2 weeks due to holiday. Discharge instructions reviewed with patient, all questions answered, copy given to patient. Dressings were applied to all wounds per M.D. Instructions at this visit.

## 2020-09-09 ENCOUNTER — HOSPITAL ENCOUNTER (OUTPATIENT)
Dept: WOUND CARE | Age: 45
Discharge: HOME | End: 2020-09-09
Payer: COMMERCIAL

## 2020-09-09 VITALS
HEART RATE: 89 BPM | BODY MASS INDEX: 35.54 KG/M2 | HEIGHT: 72 IN | WEIGHT: 262.4 LBS | DIASTOLIC BLOOD PRESSURE: 91 MMHG | TEMPERATURE: 98.1 F | RESPIRATION RATE: 18 BRPM | SYSTOLIC BLOOD PRESSURE: 135 MMHG

## 2020-09-09 PROCEDURE — 11043 DBRDMT MUSC&/FSCA 1ST 20/<: CPT | Performed by: INTERNAL MEDICINE

## 2020-09-09 PROCEDURE — 11043 DBRDMT MUSC&/FSCA 1ST 20/<: CPT

## 2020-09-09 PROCEDURE — 17250 CHEM CAUT OF GRANLTJ TISSUE: CPT

## 2020-09-09 RX ORDER — LIDOCAINE 40 MG/G
CREAM TOPICAL ONCE
Status: DISCONTINUED | OUTPATIENT
Start: 2020-09-09 | End: 2020-09-10 | Stop reason: HOSPADM

## 2020-09-09 ASSESSMENT — PAIN SCALES - GENERAL
PAINLEVEL_OUTOF10: 0
PAINLEVEL_OUTOF10: 0

## 2020-09-10 ENCOUNTER — TELEPHONE (OUTPATIENT)
Dept: WOUND CARE | Age: 45
End: 2020-09-10

## 2020-09-10 NOTE — PROGRESS NOTES
Returned phone call to patient. Pt called the [facility] stating that he has drainage that soaked through his bandage and sock. Instructed patient to change his dressing. Instructed patient to remove dressing, clean wound, place silver alginate to wound, cover with 4x4's,  ABD, kerlix, and ace. Instructed patient to call the [facility] with any issues. Pt has appointment in the [facility] on Monday with Dr Loraine Chin.

## 2020-09-13 PROBLEM — M86.9 TOE OSTEOMYELITIS, LEFT (HCC): Status: RESOLVED | Noted: 2020-07-24 | Resolved: 2020-09-13

## 2020-09-14 ENCOUNTER — HOSPITAL ENCOUNTER (OUTPATIENT)
Dept: WOUND CARE | Age: 45
Discharge: HOME | End: 2020-09-14
Payer: COMMERCIAL

## 2020-09-14 VITALS
HEIGHT: 72 IN | TEMPERATURE: 98.8 F | SYSTOLIC BLOOD PRESSURE: 146 MMHG | BODY MASS INDEX: 35.25 KG/M2 | WEIGHT: 260.25 LBS | HEART RATE: 85 BPM | RESPIRATION RATE: 18 BRPM | DIASTOLIC BLOOD PRESSURE: 96 MMHG

## 2020-09-14 PROCEDURE — 11042 DBRDMT SUBQ TIS 1ST 20SQCM/<: CPT

## 2020-09-14 RX ORDER — LIDOCAINE 40 MG/G
CREAM TOPICAL ONCE
Status: DISCONTINUED | OUTPATIENT
Start: 2020-09-14 | End: 2020-09-15 | Stop reason: HOSPADM

## 2020-09-14 RX ORDER — GABAPENTIN 400 MG/1
CAPSULE ORAL
Qty: 180 CAPSULE | Refills: 0 | Status: SHIPPED | OUTPATIENT
Start: 2020-09-16 | End: 2020-10-12

## 2020-09-14 ASSESSMENT — PAIN SCALES - GENERAL: PAINLEVEL_OUTOF10: 0

## 2020-09-14 NOTE — PLAN OF CARE
Pt to the [address] [place] [address],[address] [place] [address] for follow up appointment. Wound debrided today per Dr Tara Chinchilla. Pt had to change dressing on Thursday due to drainage from nail on another toe. Pt to continue with weekly dressing. Pt to follow up in the [address] [place] [address],[address] [place] [address] in 1 week. Discharge instructions reviewed with patient, all questions answered, copy given to patient. Dressings were applied to all wounds per M.D. Instructions at this visit.

## 2020-09-14 NOTE — PROGRESS NOTES
[facility] Progress Note    Deric Pantoja     : 1975    DATE OF VISIT:  2020    Subjective:     Deric Pantoja is a 45 y.o. male who has a diabetic and open surgical ulcer located on the foot (left , medial, forefoot). Significant symptoms or pertinent wound history since last visit: feeling well overall, no pain, mild swelling, mild-mod drainage, no fever. Glucoses doing better than weeks ago (reported mostly low-mid 100s now). Additional ulcer(s) noted? no.      His current medication list consists of Aspirin-Acetaminophen-Caffeine, HYDROcodone-Acetaminophen, Insulin Glargine (1 Unit Dial), Insulin Syringe-Needle U-100, Lancets, PARoxetine, ammonium lactate, blood glucose test strips, carvedilol, furosemide, gabapentin, glimepiride, insulin lispro, loratadine, losartan, sildenafil, simvastatin, tiZANidine, and traZODone. Allergies: Januvia [sitagliptin]; Metformin and related; Vancomycin; and Mustard oil [allyl isothiocyanate]    Objective:     Vitals:    20 1515   BP: (!) 135/91   Pulse: 89   Resp: 18   Temp: 98.1 °F (36.7 °C)   TempSrc: Oral   Weight: 262 lb 6.4 oz (119 kg)   Height: 6' (1.829 m)     AAOx3, overweight, NAD  Intact distal pulses, foot warm, good cap refill  Mild LLE edema  No cellulitis, angitis, fluctuance  Loss of pedal protective sensation, no acute arthritis, no evolving deformities  On the lower legs multiple small scars of what I think was necrobiosis, a couple of small areas superficially open, but clean and red  Shanita-ulcer skin: indurated, pink, warm, dry and hyperkeratotic. Ulcer(s): some decent granulation, a good deal of fibrin and biofilm, small amount of fat and fascial necrosis, still a bit of exposed met head bone (but white, shiny, healthy, less exposed than before); the one proximal area over the previously bleeding metatarsal artery still has a bit of depth, but is filling in; no purulence.  Photos also saved in sq cm.    The ulcers were then irrigated with normal saline solution. The procedure was completed with a moderate amount of bleeding, and hemostasis was with pressure. The patient tolerated the procedure well, with no significant complications. The patient's level of pain during and after the procedure was monitored. Post-debridement measurements, if different from pre-debridement, are in the flowsheet as well. Discharge plan:     Treatment in the wound care center today, per RN documentation: Wound 07/31/20 Proximal #1, Left Great toe amp site, DFU, Reyna 4, onset 7/24/20-Dressing/Treatment: (Betadine,Vashe,Triad,Opticell Ag rope,gauze,ABD,Kerlix,). Keep up the good work with glucose control. Offloading with modified surgical shoe; avoid unnecessary ambulation. Have recommended HBOT to him, but he wants to hold off, because of his work schedule, unless the ulcer stalls or regresses; we did discuss the risk in that, since regression could easily involve another deep infection, need for more Abx and surgery, risk of recurrent hospital admission, etc.     Keep current dressing in place and dry for the week, per Dr. Cony Andrade ongoing plan. Home treatment: good handwashing before and after any dressing changes. Cleanse wound with saline or soap & water before dressing change. May use Vaseline (petrolatum), Aquaphor, Aveeno, CeraVe, Cetaphil, Eucerin, Lubriderm, etc for dry skin. Dressing type for home: OTC antibiotic ointment and a dry dressing to the small leg ulcers, once daily. Written discharge instructions given to patient. Follow up in 1 week with Dr. Berny Taylor.     Electronically signed by Cornelia Hoyt MD on 9/13/2020 at 8:15 PM.

## 2020-09-21 ENCOUNTER — HOSPITAL ENCOUNTER (OUTPATIENT)
Dept: WOUND CARE | Age: 45
Discharge: HOME | End: 2020-09-21
Payer: COMMERCIAL

## 2020-09-21 VITALS
DIASTOLIC BLOOD PRESSURE: 102 MMHG | HEART RATE: 103 BPM | RESPIRATION RATE: 18 BRPM | WEIGHT: 264.4 LBS | SYSTOLIC BLOOD PRESSURE: 151 MMHG | HEIGHT: 72 IN | TEMPERATURE: 98.2 F | BODY MASS INDEX: 35.81 KG/M2

## 2020-09-21 PROCEDURE — 11042 DBRDMT SUBQ TIS 1ST 20SQCM/<: CPT

## 2020-09-21 RX ORDER — HYDROCODONE BITARTRATE AND ACETAMINOPHEN 5; 325 MG/1; MG/1
1-2 TABLET ORAL EVERY 4 HOURS PRN
Qty: 25 TABLET | Refills: 0 | Status: SHIPPED | OUTPATIENT
Start: 2020-09-21 | End: 2020-09-28

## 2020-09-21 RX ORDER — LIDOCAINE 40 MG/G
CREAM TOPICAL ONCE
Status: DISCONTINUED | OUTPATIENT
Start: 2020-09-21 | End: 2020-09-22 | Stop reason: HOSPADM

## 2020-09-21 ASSESSMENT — PAIN SCALES - GENERAL: PAINLEVEL_OUTOF10: 0

## 2020-09-21 NOTE — PLAN OF CARE
Pt to the [facility] for follow up appointment. Wound debrided today and is measuring smaller. Pt to continue with silver alginate to wound. Pt to change dressing next week at home and follow up in the [facility] in 2 weeks. Discharge instructions reviewed with patient, all questions answered, copy given to patient. Dressings were applied to all wounds per M.D. Instructions at this visit.

## 2020-09-21 NOTE — PROGRESS NOTES
88 [facility] Progress Note      Lili Hoang     : 1975    DATE OF VISIT:  2020    Subjective:     Lili Hoang is a 45 y.o. male who has a chief complaint of a diabetic ulcer located on the left foot. States mild pain in the arch at times but not everyday. States DM levels are doing better at this time. Mr. 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 He has a past surgical history that includes Tonsillectomy; [place] tooth extraction; Knee arthroscopy (Left, [id number]); Toe amputation (Right, 2019); other surgical history (Left, 2020); and Toe amputation (Left, 2020). His family history includes Diabetes in his father and mother; High Blood Pressure in his father, maternal grandmother, maternal uncle, mother, and sister; High Cholesterol in his father, maternal uncle, and mother; Stroke in his father. Mr. Kaya Bardales reports that he quit smoking about 15 years ago. He has a 1.00 pack-year smoking history. He quit smokeless tobacco use about 15 years ago. He reports current alcohol use. He reports that he does not use drugs. His current medication list consists of Aspirin-Acetaminophen-Caffeine, HYDROcodone-Acetaminophen, HYDROcodone-acetaminophen, Insulin Glargine (1 Unit Dial), Insulin Syringe-Needle U-100, Lancets, PARoxetine, ammonium lactate, blood glucose test strips, carvedilol, furosemide, gabapentin, glimepiride, insulin lispro, loratadine, losartan, sildenafil, simvastatin, tiZANidine, and traZODone.     Allergies: Januvia [sitagliptin]; Metformin and related; Vancomycin; and Mustard oil [allyl isothiocyanate]    Pertinent items from the review of systems are discussed in the HPI; the remainder of the ROS was reviewed and is negative. Objective:     BP (!) 151/102 Comment: pt asymptomatic  Pulse 103   Temp 98.2 °F (36.8 °C) (Oral)   Resp 18   Ht 6' (1.829 m)   Wt 264 lb 6.4 oz (119.9 kg)   BMI 35.86 kg/m²   General Appearance: alert and oriented to person, place and time, well developed and well- nourished, in no acute distress  Skin: warm and dry, no rash or erythema  Head: normocephalic and atraumatic  Eyes: pupils equal, round, and reactive to light, extraocular eye movements intact, conjunctivae normal  ENT: tympanic membrane, external ear and ear canal normal bilaterally, nose without deformity, nasal mucosa and turbinates normal without polyps  Neck: supple and non-tender without mass, no thyromegaly or thyroid nodules, no cervical lymphadenopathy  Pulmonary/Chest: clear to auscultation bilaterally- no wheezes, rales or rhonchi, normal air movement, no respiratory distress  Cardiovascular: normal rate, regular rhythm, normal S1 and S2, no murmurs, rubs, clicks, or gallops, distal pulses intact, no carotid bruits  Abdomen: soft, non-tender, non-distended, normal bowel sounds, no masses or organomegaly. Dorsalis pedis pulse left palpable  Posterior tibial pulse left palpable  Dorsalis pedis pulse right palpable  Posterior tibial pulse right palpable        Ulcer on the left hallux amputation site with mild fibrotic tissue, red granulation tissue, mild serous drainage, no hyperkeratotic rim, no undermining, no tunneling, no purulence, no malodor, no eschar,no periwound maceration, mild periwound erythema, mild edema, no crepitus, no increase in skin temperature, ulcer probes to soft tissue only    Today's ulcer measurements are in the wound documentation flowsheet.      Wound measurements:  Wound 07/31/20 Proximal #1, Left Great toe amp site, DFU, Reyna 4, onset 7/24/20-Wound Length (cm): 4.1 cm    Wound 07/31/20 Proximal #1, Left Great toe amp site, DFU, Reyna 4, onset 7/24/20-Wound Width (cm): 2.1 cm    Wound 07/31/20 Proximal #1, Left Great toe amp site, DFU, Reyna 4, onset 7/24/20-Wound Depth (cm): 0.8 cm    LABS  Lab Results   Component Value Date    LABA1C 11.7 07/24/2020         Assessment:     Patient Active Problem List   Diagnosis Code    Hypertension I10    Uncontrolled type 2 diabetes mellitus with diabetic polyneuropathy ([facility] [address] E11.42, E11.65    Cardiomyopathy ([facility] [address] I42.9    Mixed hyperlipidemia E78.2    Diabetic ulcer of toe of left foot associated with type 2 diabetes mellitus, with necrosis of bone (HCC) E11.621, L97.524    Allergic rhinitis J30.9    Arthritis M19.90    Osteoarthritis M19.90       Assessment of today's active condition(s): diabetic foot ulcer left hallux amputation, diabetes mellitus. Factors contributing to occurrence and/or persistence of the chronic ulcer include diabetes and poor glucose control. Sharp debridement is indicated today, based upon the exam findings in the ulcer(s) above. Procedure note:     Consent obtained. Time out taken. Anesthetic  Anesthetic:  (post debridement)     After application of topical 4% lidocaine plain to the ulcer, the ulcer was debrided of all fibrotic, necrotic, hyperkeratotic tissue, nonviable and viable tissue through the subcutaneous tissue. This excised skin and subcutaneous tissue with a scalpel. Total Surface Area Debrided:  8 sq cm. The ulcer(s) were then irrigated with normal saline solution. The procedure was completed with a small amount of bleeding, and hemostasis was by pressure. The patient tolerated the procedure well, with no significant complications. The patient's level of pain during and after the procedure was monitored, and is noted in the wound documentation flowsheet.          Discharge plan:     Treatment in the wound care center

## 2020-09-23 NOTE — PROGRESS NOTES
isothiocyanate]    Pertinent items from the review of systems are discussed in the HPI; the remainder of the ROS was reviewed and is negative. Objective:     /83   Pulse 84   Temp 98.2 °F (36.8 °C) (Oral)   Resp 20   Ht 6' (1.829 m)   Wt 259 lb (117.5 kg)   BMI 35.13 kg/m²   General Appearance: alert and oriented to person, place and time, well developed and well- nourished, in no acute distress  Skin: warm and dry, no rash or erythema  Head: normocephalic and atraumatic  Eyes: pupils equal, round, and reactive to light, extraocular eye movements intact, conjunctivae normal  ENT: tympanic membrane, external ear and ear canal normal bilaterally, nose without deformity, nasal mucosa and turbinates normal without polyps  Neck: supple and non-tender without mass, no thyromegaly or thyroid nodules, no cervical lymphadenopathy  Pulmonary/Chest: clear to auscultation bilaterally- no wheezes, rales or rhonchi, normal air movement, no respiratory distress  Cardiovascular: normal rate, regular rhythm, normal S1 and S2, no murmurs, rubs, clicks, or gallops, distal pulses intact, no carotid bruits  Abdomen: soft, non-tender, non-distended, normal bowel sounds, no masses or organomegaly. Dorsalis pedis pulse left palpable  Posterior tibial pulse left palpable  Dorsalis pedis pulse right palpable  Posterior tibial pulse right palpable        Ulcer on the left hallux amputation site with mild fibrotic tissue, red granulation tissue, mild serous drainage, no hyperkeratotic rim, + undermining proximal lateral aspect only, no tunneling, no purulence, no malodor, no eschar,no periwound maceration, mild periwound erythema, mild edema, no crepitus, no increase in skin temperature, ulcer probes to bone at distal aspect of the 1st MH which appears healthy    Today's ulcer measurements are in the wound documentation flowsheet.      Wound measurements:  Wound 07/31/20 Proximal #1, Left Great toe amp site, DFU, Reyna 4, onset 7/24/20-Wound Length (cm): 5 cm    Wound 07/31/20 Proximal #1, Left Great toe amp site, DFU, Reyna 4, onset 7/24/20-Wound Width (cm): 3 cm    Wound 07/31/20 Proximal #1, Left Great toe amp site, DFU, Reyna 4, onset 7/24/20-Wound Depth (cm): 1.2 cm    LABS  Lab Results   Component Value Date    LABA1C 11.7 07/24/2020         Assessment:     Patient Active Problem List   Diagnosis Code    Hypertension I10    Uncontrolled type 2 diabetes mellitus with diabetic polyneuropathy ([facility] [address] E11.42, E11.65    Cardiomyopathy ([facility] [address] I42.9    Mixed hyperlipidemia E78.2    Diabetic ulcer of toe of left foot associated with type 2 diabetes mellitus, with necrosis of bone (HCC) E11.621, L97.524    Allergic rhinitis J30.9    Arthritis M19.90    Osteoarthritis M19.90       Assessment of today's active condition(s): diabetic foot ulcer left hallux amputation, diabetes mellitus. Factors contributing to occurrence and/or persistence of the chronic ulcer include diabetes and poor glucose control. Sharp debridement is indicated today, based upon the exam findings in the ulcer(s) above. Procedure note:     Consent obtained. Time out taken. Anesthetic  Anesthetic:  (post debridement)     After application of topical 4% lidocaine plain to the ulcer, the ulcer was debrided of all fibrotic, necrotic, hyperkeratotic tissue, nonviable and viable tissue through the subcutaneous tissue. This excised skin and subcutaneous tissue with a scalpel. Total Surface Area Debrided:  12 sq cm. The ulcer(s) were then irrigated with normal saline solution. The procedure was completed with a small amount of bleeding, and hemostasis was by pressure. The patient tolerated the procedure well, with no significant complications. The patient's level of pain during and after the procedure was monitored, and is noted in the wound documentation flowsheet.          Discharge plan:     Treatment in the wound care center today: Wound 07/31/20 Proximal #1, Left Great toe amp site, DFU, Reyna 4, onset 7/24/20-Dressing/Treatment: (Betadine,Opticell Ag,gauze,ABD,Kerlix,Coban-no compression). Home treatment: good handwashing before and after any dressing changes. Cleanse ulcer with saline or soap & water before dressing change. May use Vaseline (petrolatum), Aquaphor, Aveeno, CeraVe, Cetaphil, Eucerin, Lubriderm, etc for dry skin. Dressing type for home: Silver alginate and dry dressing applied. Written discharge instructions given to patient. Offload ulcer(s) as directed. Elevate leg(s) as directed. Follow up in 1 week with Dr. Geovanna Delaney and 2 weeks with me due to holiday. Needs to control glucose levels.           Electronically signed by Dc Farfan DPM on 9/23/2020 at 5:14 PM.

## 2020-09-24 NOTE — PROGRESS NOTES
losartan, sildenafil, simvastatin, tiZANidine, and traZODone. Allergies: Januvia [sitagliptin]; Metformin and related; Vancomycin; and Mustard oil [allyl isothiocyanate]    Pertinent items from the review of systems are discussed in the HPI; the remainder of the ROS was reviewed and is negative. Objective:     BP (!) 146/96   Pulse 85   Temp 98.8 °F (37.1 °C) (Oral)   Resp 18   Ht 6' (1.829 m)   Wt 260 lb 4 oz (118 kg)   BMI 35.30 kg/m²   General Appearance: alert and oriented to person, place and time, well developed and well- nourished, in no acute distress  Skin: warm and dry, no rash or erythema  Head: normocephalic and atraumatic  Eyes: pupils equal, round, and reactive to light, extraocular eye movements intact, conjunctivae normal  ENT: tympanic membrane, external ear and ear canal normal bilaterally, nose without deformity, nasal mucosa and turbinates normal without polyps  Neck: supple and non-tender without mass, no thyromegaly or thyroid nodules, no cervical lymphadenopathy  Pulmonary/Chest: clear to auscultation bilaterally- no wheezes, rales or rhonchi, normal air movement, no respiratory distress  Cardiovascular: normal rate, regular rhythm, normal S1 and S2, no murmurs, rubs, clicks, or gallops, distal pulses intact, no carotid bruits  Abdomen: soft, non-tender, non-distended, normal bowel sounds, no masses or organomegaly.      Dorsalis pedis pulse left palpable  Posterior tibial pulse left palpable  Dorsalis pedis pulse right palpable  Posterior tibial pulse right palpable        Ulcer on the left hallux amputation site with mild fibrotic tissue, red granulation tissue, mild serous drainage, no hyperkeratotic rim, + undermining proximal lateral aspect only, no tunneling, no purulence, no malodor, no eschar,no periwound maceration, mild periwound erythema, mild edema, no crepitus, no increase in skin temperature, ulcer probes to bone at distal aspect of the 1st MH which appears healthy    Today's ulcer measurements are in the wound documentation flowsheet. Wound measurements:  Wound 07/31/20 Proximal #1, Left Great toe amp site, DFU, Reyna 4, onset 7/24/20-Wound Length (cm): 4.5 cm    Wound 07/31/20 Proximal #1, Left Great toe amp site, DFU, Reyna 4, onset 7/24/20-Wound Width (cm): 2 cm    Wound 07/31/20 Proximal #1, Left Great toe amp site, DFU, Reyna 4, onset 7/24/20-Wound Depth (cm): 2.2 cm    LABS  Lab Results   Component Value Date    LABA1C 11.7 07/24/2020         Assessment:     Patient Active Problem List   Diagnosis Code    Hypertension I10    Uncontrolled type 2 diabetes mellitus with diabetic polyneuropathy ([facility] [address] E11.42, E11.65    Cardiomyopathy ([facility] [address] I42.9    Mixed hyperlipidemia E78.2    Diabetic ulcer of toe of left foot associated with type 2 diabetes mellitus, with necrosis of bone (HCC) E11.621, L97.524    Allergic rhinitis J30.9    Arthritis M19.90    Osteoarthritis M19.90       Assessment of today's active condition(s): diabetic foot ulcer left hallux amputation, diabetes mellitus. Factors contributing to occurrence and/or persistence of the chronic ulcer include diabetes and poor glucose control. Sharp debridement is indicated today, based upon the exam findings in the ulcer(s) above. Procedure note:     Consent obtained. Time out taken. Anesthetic  Anesthetic:  (post debridement)     After application of topical 4% lidocaine plain to the ulcer, the ulcer was debrided of all fibrotic, necrotic, hyperkeratotic tissue, nonviable and viable tissue through the subcutaneous tissue. This excised skin and subcutaneous tissue with a scalpel. Total Surface Area Debrided:  8 sq cm. The ulcer(s) were then irrigated with normal saline solution. The procedure was completed with a small amount of bleeding, and hemostasis was by pressure. The patient tolerated the procedure well, with no significant complications.  The patient's level of pain during and after the procedure was monitored, and is noted in the wound documentation flowsheet. Discharge plan:     Treatment in the wound care center today: Wound 07/31/20 Proximal #1, Left Great toe amp site, DFU, Reyna 4, onset 7/24/20-Dressing/Treatment: (betadine, silver Alg,4x4,ABD,kerlix,Coban). Home treatment: good handwashing before and after any dressing changes. Cleanse ulcer with saline or soap & water before dressing change. May use Vaseline (petrolatum), Aquaphor, Aveeno, CeraVe, Cetaphil, Eucerin, Lubriderm, etc for dry skin. Dressing type for home: Silver alginate and dry dressing applied. Written discharge instructions given to patient. Offload ulcer(s) as directed. Elevate leg(s) as directed. Follow up in 1 week. Needs to control glucose levels.           Electronically signed by Sahra Wisdom DPM on 9/23/2020 at 8:59 PM.

## 2020-10-05 ENCOUNTER — HOSPITAL ENCOUNTER (OUTPATIENT)
Dept: WOUND CARE | Age: 45
Discharge: HOME | End: 2020-10-05
Payer: COMMERCIAL

## 2020-10-05 RX ORDER — HYDROCODONE BITARTRATE AND ACETAMINOPHEN 5; 325 MG/1; MG/1
1-2 TABLET ORAL EVERY 4 HOURS PRN
Qty: 25 TABLET | Refills: 0 | Status: SHIPPED | OUTPATIENT
Start: 2020-10-05 | End: 2020-10-12

## 2020-10-05 NOTE — PROGRESS NOTES
Patient's wife called stating that the patient has been up sick last night with a stomach bug. She denies that it is his foot. Instructed her to call if anything should change with patient's foot. Rescheduled appointment for next week.

## 2020-10-12 ENCOUNTER — HOSPITAL ENCOUNTER (OUTPATIENT)
Dept: WOUND CARE | Age: 45
Discharge: HOME | End: 2020-10-12
Payer: COMMERCIAL

## 2020-10-12 VITALS
HEART RATE: 92 BPM | DIASTOLIC BLOOD PRESSURE: 99 MMHG | TEMPERATURE: 98.4 F | RESPIRATION RATE: 18 BRPM | BODY MASS INDEX: 35.46 KG/M2 | HEIGHT: 72 IN | SYSTOLIC BLOOD PRESSURE: 152 MMHG | WEIGHT: 261.8 LBS

## 2020-10-12 PROCEDURE — 11042 DBRDMT SUBQ TIS 1ST 20SQCM/<: CPT

## 2020-10-12 RX ORDER — LIDOCAINE 40 MG/G
CREAM TOPICAL ONCE
Status: DISCONTINUED | OUTPATIENT
Start: 2020-10-12 | End: 2020-10-13 | Stop reason: HOSPADM

## 2020-10-12 RX ORDER — GABAPENTIN 400 MG/1
CAPSULE ORAL
Qty: 180 CAPSULE | Refills: 0 | Status: SHIPPED | OUTPATIENT
Start: 2020-10-12 | End: 2020-11-10 | Stop reason: SDUPTHER

## 2020-10-12 ASSESSMENT — PAIN DESCRIPTION - ORIENTATION: ORIENTATION: LEFT

## 2020-10-12 ASSESSMENT — PAIN DESCRIPTION - ONSET: ONSET: ON-GOING

## 2020-10-12 ASSESSMENT — PAIN DESCRIPTION - FREQUENCY: FREQUENCY: CONTINUOUS

## 2020-10-12 ASSESSMENT — PAIN SCALES - GENERAL: PAINLEVEL_OUTOF10: 8

## 2020-10-12 ASSESSMENT — PAIN DESCRIPTION - PAIN TYPE: TYPE: ACUTE PAIN

## 2020-10-12 ASSESSMENT — PAIN DESCRIPTION - LOCATION: LOCATION: FOOT

## 2020-10-12 ASSESSMENT — PAIN - FUNCTIONAL ASSESSMENT: PAIN_FUNCTIONAL_ASSESSMENT: PREVENTS OR INTERFERES SOME ACTIVE ACTIVITIES AND ADLS

## 2020-10-12 ASSESSMENT — PAIN DESCRIPTION - DESCRIPTORS: DESCRIPTORS: THROBBING

## 2020-10-12 ASSESSMENT — PAIN DESCRIPTION - PROGRESSION: CLINICAL_PROGRESSION: GRADUALLY WORSENING

## 2020-10-12 NOTE — PLAN OF CARE
Pt to the [address] [place] [address],[address] for follow up appointment. Wound debrided today. Pt to continue with silver alginate to wound. Pt to follow up in the [address] [place] [address],[facility] Floor in 1 week. Discharge instructions reviewed with patient, all questions answered, copy given to patient. Dressings were applied to all wounds per M.D. Instructions at this visit.

## 2020-10-12 NOTE — PROGRESS NOTES
88 [facility] Progress Note      Jayme Wan     : 1975    DATE OF VISIT:  10/12/2020    Subjective:     Jayme Wan is a 39 y.o. male who has a chief complaint of a diabetic ulcer located on the left foot. States nerve pain in the ulcer site for last several days. States DM levels are doing better at this time. Mr. Laura Parra has a past medical history of Acute osteomyelitis of right hallux ([facility] [address] CHF (congestive heart failure) ([facility] [address] Clostridium difficile infection, Diabetic ulcer of right great toe associated with type 2 diabetes mellitus, with necrosis of bone ([facility] [address] Diet-controlled type 2 diabetes mellitus ([facility] [address] Hyperlipidemia, Hypertension, Migraine, Tear of medial meniscus of left knee, Tobacco use, and Toe osteomyelitis, left ([facility] [address] He has a past surgical history that includes Tonsillectomy; [place] tooth extraction; Knee arthroscopy (Left, [id number]); Toe amputation (Right, 2019); other surgical history (Left, 2020); and Toe amputation (Left, 2020). His family history includes Diabetes in his father and mother; High Blood Pressure in his father, maternal grandmother, maternal uncle, mother, and sister; High Cholesterol in his father, maternal uncle, and mother; Stroke in his father. Mr. Laura Parra reports that he quit smoking about 15 years ago. He has a 1.00 pack-year smoking history. He quit smokeless tobacco use about 15 years ago. He reports current alcohol use. He reports that he does not use drugs. His current medication list consists of Aspirin-Acetaminophen-Caffeine, HYDROcodone-acetaminophen, Insulin Glargine (1 Unit Dial), Insulin Syringe-Needle U-100, Lancets, PARoxetine, ammonium lactate, blood glucose test strips, carvedilol, furosemide, gabapentin, glimepiride, insulin lispro, loratadine, losartan, sildenafil, simvastatin, tiZANidine, and traZODone. Allergies: Januvia [sitagliptin];  Metformin and related; Proximal #1, Left Great toe amp site, DFU, Reyna 4, onset 7/24/20-Dressing/Treatment: Other (comment)(Vashe,Betadine,OpticellAg,4x4's,ABD,kerlix,coban). Home treatment: good handwashing before and after any dressing changes. Cleanse ulcer with saline or soap & water before dressing change. May use Vaseline (petrolatum), Aquaphor, Aveeno, CeraVe, Cetaphil, Eucerin, Lubriderm, etc for dry skin. Dressing type for home: Silver alginate and dry dressing applied to be changed in 1 week. Written discharge instructions given to patient. Offload ulcer(s) as directed. Elevate leg(s) as directed. Follow up in 1 week. Needs to control glucose levels.         Electronically signed by Neva Desai DPM on 10/12/2020 at 9:41 AM.

## 2020-10-19 ENCOUNTER — HOSPITAL ENCOUNTER (OUTPATIENT)
Dept: GENERAL RADIOLOGY | Age: 45
Discharge: HOME | End: 2020-10-19
Payer: COMMERCIAL

## 2020-10-19 ENCOUNTER — HOSPITAL ENCOUNTER (OUTPATIENT)
Dept: WOUND CARE | Age: 45
Discharge: HOME | End: 2020-10-19
Payer: COMMERCIAL

## 2020-10-19 VITALS
HEIGHT: 72 IN | TEMPERATURE: 97.8 F | RESPIRATION RATE: 18 BRPM | HEART RATE: 94 BPM | WEIGHT: 261.4 LBS | DIASTOLIC BLOOD PRESSURE: 95 MMHG | BODY MASS INDEX: 35.41 KG/M2 | SYSTOLIC BLOOD PRESSURE: 144 MMHG

## 2020-10-19 PROCEDURE — 73620 X-RAY EXAM OF FOOT: CPT

## 2020-10-19 PROCEDURE — 11042 DBRDMT SUBQ TIS 1ST 20SQCM/<: CPT

## 2020-10-19 RX ORDER — LIDOCAINE 40 MG/G
CREAM TOPICAL PRN
Status: DISCONTINUED | OUTPATIENT
Start: 2020-10-19 | End: 2020-10-20 | Stop reason: HOSPADM

## 2020-10-19 RX ORDER — AMOXICILLIN AND CLAVULANATE POTASSIUM 875; 125 MG/1; MG/1
1 TABLET, FILM COATED ORAL 2 TIMES DAILY
Qty: 20 TABLET | Refills: 0 | Status: SHIPPED | OUTPATIENT
Start: 2020-10-19 | End: 2020-10-26 | Stop reason: ALTCHOICE

## 2020-10-19 ASSESSMENT — PAIN SCALES - GENERAL: PAINLEVEL_OUTOF10: 0

## 2020-10-19 NOTE — PROGRESS NOTES
88 [facility] Progress Note      Maureen Carmichael     : 1975    DATE OF VISIT:  10/19/2020    Subjective:     Maureen Carmichael is a 39 y.o. male who has a chief complaint of a diabetic ulcer located on the left foot. States nerve pain in the ulcer site for last several days. States DM levels are doing OK currently. Noticed increased redness from the wound. Mr. Nilda Jean Baptiste has a past medical history of Acute osteomyelitis of right hallux ([address] CHF (congestive heart failure) ([address] Clostridium difficile infection, Diabetic ulcer of right great toe associated with type 2 diabetes mellitus, with necrosis of bone ([address] Diet-controlled type 2 diabetes mellitus ([address] Hyperlipidemia, Hypertension, Migraine, Tear of medial meniscus of left knee, Tobacco use, and Toe osteomyelitis, left ([address] He has a past surgical history that includes Tonsillectomy; [place] tooth extraction; Knee arthroscopy (Left, [id number]); Toe amputation (Right, 2019); other surgical history (Left, 2020); and Toe amputation (Left, 2020). His family history includes Diabetes in his father and mother; High Blood Pressure in his father, maternal grandmother, maternal uncle, mother, and sister; High Cholesterol in his father, maternal uncle, and mother; Stroke in his father. Mr. Nilda Jean Baptiste reports that he quit smoking about 15 years ago. He has a 1.00 pack-year smoking history. He quit smokeless tobacco use about 15 years ago. He reports current alcohol use. He reports that he does not use drugs. His current medication list consists of Aspirin-Acetaminophen-Caffeine, Insulin Glargine (1 Unit Dial), Insulin Syringe-Needle U-100, Lancets, PARoxetine, ammonium lactate, amoxicillin-clavulanate, blood glucose test strips, carvedilol, furosemide, gabapentin, glimepiride, insulin lispro, loratadine, losartan, sildenafil, simvastatin, tiZANidine, and traZODone.     Allergies: Januvia [sitagliptin]; Metformin and related; Vancomycin; and Mustard oil [allyl isothiocyanate]    Pertinent items from the review of systems are discussed in the HPI; the remainder of the ROS was reviewed and is negative. Objective:     BP (!) 144/95   Pulse 94   Temp 97.8 °F (36.6 °C) (Oral)   Resp 18   Ht 6' (1.829 m)   Wt 261 lb 6.4 oz (118.6 kg)   BMI 35.45 kg/m²   General Appearance: alert and oriented to person, place and time, well developed and well- nourished, in no acute distress  Skin: warm and dry, no rash or erythema  Head: normocephalic and atraumatic  Eyes: pupils equal, round, and reactive to light, extraocular eye movements intact, conjunctivae normal  ENT: tympanic membrane, external ear and ear canal normal bilaterally, nose without deformity, nasal mucosa and turbinates normal without polyps  Neck: supple and non-tender without mass, no thyromegaly or thyroid nodules, no cervical lymphadenopathy  Pulmonary/Chest: clear to auscultation bilaterally- no wheezes, rales or rhonchi, normal air movement, no respiratory distress  Cardiovascular: normal rate, regular rhythm, normal S1 and S2, no murmurs, rubs, clicks, or gallops, distal pulses intact, no carotid bruits  Abdomen: soft, non-tender, non-distended, normal bowel sounds, no masses or organomegaly. Dorsalis pedis pulse left palpable  Posterior tibial pulse left palpable  Dorsalis pedis pulse right palpable  Posterior tibial pulse right palpable        Ulcer on the left hallux amputation site with mild fibrotic tissue, red granulation tissue, mild serous drainage, no hyperkeratotic rim, no undermining, no tunneling, no purulence, no malodor, no eschar, no periwound maceration, moderate periwound erythema, mild edema, no crepitus, no increase in skin temperature, ulcer probes to bone    Today's ulcer measurements are in the wound documentation flowsheet.      Wound measurements:  Wound 07/31/20 Proximal #1, Left Great toe amp site, DFU,

## 2020-10-19 NOTE — PLAN OF CARE
Pt to the [facility] for follow up appointment. Redness noted around wound, wound measuring deeper today, and wound was debrided today. Pt to have X-ray of left foot today and to start on oral antibiotics today. Pt to continue with present dressing regimen. Dr Yumiko Varela discussed with patient possible need to take patient back to the OR depending on X-ray results. Pt verbalized an understanding. Pt to follow up in the [facility] in 1 week. Discharge instructions reviewed with patient, all questions answered, copy given to patient. Dressings were applied to all wounds per M.D. Instructions at this visit.

## 2020-10-20 ENCOUNTER — HOSPITAL ENCOUNTER (OUTPATIENT)
Age: 45
Discharge: HOME | End: 2020-10-20
Payer: COMMERCIAL

## 2020-10-20 LAB — SARS-COV-2, NAAT: NOT DETECTED

## 2020-10-20 PROCEDURE — U0002 COVID-19 LAB TEST NON-CDC: HCPCS

## 2020-10-20 NOTE — PROGRESS NOTES
PAT completed with patient orders placed per MD. Covid test completed and not detected see chart, patient aware of 0630 arrival time NPO after midnight the night prior to surgery may take AM medications with sip of water, aware he can bring 1 family member with him day of surgery aware they will remain in waiting room and be updated by staff and MD. Patient denies any illness in the home. Harry Louie

## 2020-10-20 NOTE — PRE-PROCEDURE INSTRUCTIONS

## 2020-10-22 ENCOUNTER — ANESTHESIA (OUTPATIENT)
Dept: OPERATING ROOM | Age: 45
End: 2020-10-22
Payer: COMMERCIAL

## 2020-10-22 ENCOUNTER — ANESTHESIA EVENT (OUTPATIENT)
Dept: OPERATING ROOM | Age: 45
End: 2020-10-22
Payer: COMMERCIAL

## 2020-10-22 ENCOUNTER — APPOINTMENT (OUTPATIENT)
Dept: GENERAL RADIOLOGY | Age: 45
End: 2020-10-22
Attending: PODIATRIST
Payer: COMMERCIAL

## 2020-10-22 ENCOUNTER — HOSPITAL ENCOUNTER (OUTPATIENT)
Age: 45
Setting detail: OUTPATIENT SURGERY
Discharge: HOME | End: 2020-10-22
Attending: PODIATRIST | Admitting: PODIATRIST
Payer: COMMERCIAL

## 2020-10-22 VITALS
RESPIRATION RATE: 23 BRPM | TEMPERATURE: 98.6 F | DIASTOLIC BLOOD PRESSURE: 66 MMHG | SYSTOLIC BLOOD PRESSURE: 119 MMHG | OXYGEN SATURATION: 97 %

## 2020-10-22 VITALS
HEIGHT: 72 IN | SYSTOLIC BLOOD PRESSURE: 133 MMHG | WEIGHT: 260 LBS | RESPIRATION RATE: 18 BRPM | OXYGEN SATURATION: 95 % | TEMPERATURE: 97.2 F | DIASTOLIC BLOOD PRESSURE: 82 MMHG | HEART RATE: 96 BPM | BODY MASS INDEX: 35.21 KG/M2

## 2020-10-22 LAB
GLUCOSE BLD-MCNC: 323 MG/DL (ref 70–99)
PERFORMED ON: ABNORMAL

## 2020-10-22 PROCEDURE — 87077 CULTURE AEROBIC IDENTIFY: CPT

## 2020-10-22 PROCEDURE — 2580000003 HC RX 258

## 2020-10-22 PROCEDURE — 88311 DECALCIFY TISSUE: CPT

## 2020-10-22 PROCEDURE — 3700000000 HC ANESTHESIA ATTENDED CARE: Performed by: PODIATRIST

## 2020-10-22 PROCEDURE — 7100000011 HC PHASE II RECOVERY - ADDTL 15 MIN: Performed by: PODIATRIST

## 2020-10-22 PROCEDURE — 6360000002 HC RX W HCPCS: Performed by: PODIATRIST

## 2020-10-22 PROCEDURE — 7100000010 HC PHASE II RECOVERY - FIRST 15 MIN: Performed by: PODIATRIST

## 2020-10-22 PROCEDURE — 87070 CULTURE OTHR SPECIMN AEROBIC: CPT

## 2020-10-22 PROCEDURE — 2580000003 HC RX 258: Performed by: PODIATRIST

## 2020-10-22 PROCEDURE — 3600000003 HC SURGERY LEVEL 3 BASE: Performed by: PODIATRIST

## 2020-10-22 PROCEDURE — 87075 CULTR BACTERIA EXCEPT BLOOD: CPT

## 2020-10-22 PROCEDURE — 2500000003 HC RX 250 WO HCPCS: Performed by: PODIATRIST

## 2020-10-22 PROCEDURE — 6360000002 HC RX W HCPCS

## 2020-10-22 PROCEDURE — 2709999900 HC NON-CHARGEABLE SUPPLY: Performed by: PODIATRIST

## 2020-10-22 PROCEDURE — 88305 TISSUE EXAM BY PATHOLOGIST: CPT

## 2020-10-22 PROCEDURE — 3700000001 HC ADD 15 MINUTES (ANESTHESIA): Performed by: PODIATRIST

## 2020-10-22 PROCEDURE — 2500000003 HC RX 250 WO HCPCS

## 2020-10-22 PROCEDURE — 73620 X-RAY EXAM OF FOOT: CPT

## 2020-10-22 PROCEDURE — 2780000010 HC IMPLANT OTHER: Performed by: PODIATRIST

## 2020-10-22 PROCEDURE — 3600000013 HC SURGERY LEVEL 3 ADDTL 15MIN: Performed by: PODIATRIST

## 2020-10-22 PROCEDURE — 87186 SC STD MICRODIL/AGAR DIL: CPT

## 2020-10-22 DEVICE — ALLOGRAFT HUM TISS 1 CC AMNIO TISS MEMBRN AMNIFLO CRYOPRES: Type: IMPLANTABLE DEVICE | Site: FOOT | Status: FUNCTIONAL

## 2020-10-22 RX ORDER — MORPHINE SULFATE 10 MG/ML
1 INJECTION, SOLUTION INTRAMUSCULAR; INTRAVENOUS EVERY 5 MIN PRN
Status: DISCONTINUED | OUTPATIENT
Start: 2020-10-22 | End: 2020-10-22 | Stop reason: HOSPADM

## 2020-10-22 RX ORDER — LIDOCAINE HYDROCHLORIDE 10 MG/ML
INJECTION, SOLUTION EPIDURAL; INFILTRATION; INTRACAUDAL; PERINEURAL
Status: COMPLETED
Start: 2020-10-22 | End: 2020-10-22

## 2020-10-22 RX ORDER — OXYCODONE HYDROCHLORIDE AND ACETAMINOPHEN 5; 325 MG/1; MG/1
1 TABLET ORAL PRN
Status: DISCONTINUED | OUTPATIENT
Start: 2020-10-22 | End: 2020-10-22 | Stop reason: HOSPADM

## 2020-10-22 RX ORDER — SODIUM CHLORIDE, SODIUM LACTATE, POTASSIUM CHLORIDE, CALCIUM CHLORIDE 600; 310; 30; 20 MG/100ML; MG/100ML; MG/100ML; MG/100ML
INJECTION, SOLUTION INTRAVENOUS CONTINUOUS
Status: DISCONTINUED | OUTPATIENT
Start: 2020-10-22 | End: 2020-10-22 | Stop reason: HOSPADM

## 2020-10-22 RX ORDER — ONDANSETRON 2 MG/ML
INJECTION INTRAMUSCULAR; INTRAVENOUS PRN
Status: DISCONTINUED | OUTPATIENT
Start: 2020-10-22 | End: 2020-10-22 | Stop reason: SDUPTHER

## 2020-10-22 RX ORDER — LIDOCAINE HYDROCHLORIDE 20 MG/ML
INJECTION, SOLUTION INFILTRATION; PERINEURAL PRN
Status: DISCONTINUED | OUTPATIENT
Start: 2020-10-22 | End: 2020-10-22 | Stop reason: SDUPTHER

## 2020-10-22 RX ORDER — MAGNESIUM HYDROXIDE 1200 MG/15ML
LIQUID ORAL CONTINUOUS PRN
Status: COMPLETED | OUTPATIENT
Start: 2020-10-22 | End: 2020-10-22

## 2020-10-22 RX ORDER — SODIUM CHLORIDE 0.9 % (FLUSH) 0.9 %
10 SYRINGE (ML) INJECTION PRN
Status: CANCELLED | OUTPATIENT
Start: 2020-10-22

## 2020-10-22 RX ORDER — MORPHINE SULFATE 10 MG/ML
2 INJECTION, SOLUTION INTRAMUSCULAR; INTRAVENOUS EVERY 5 MIN PRN
Status: DISCONTINUED | OUTPATIENT
Start: 2020-10-22 | End: 2020-10-22 | Stop reason: HOSPADM

## 2020-10-22 RX ORDER — HYDROCODONE BITARTRATE AND ACETAMINOPHEN 5; 325 MG/1; MG/1
1-2 TABLET ORAL EVERY 4 HOURS PRN
Qty: 30 TABLET | Refills: 0 | Status: SHIPPED | OUTPATIENT
Start: 2020-10-22 | End: 2020-11-02

## 2020-10-22 RX ORDER — MEPERIDINE HYDROCHLORIDE 25 MG/ML
12.5 INJECTION INTRAMUSCULAR; INTRAVENOUS; SUBCUTANEOUS EVERY 5 MIN PRN
Status: DISCONTINUED | OUTPATIENT
Start: 2020-10-22 | End: 2020-10-22 | Stop reason: HOSPADM

## 2020-10-22 RX ORDER — PROPOFOL 10 MG/ML
INJECTION, EMULSION INTRAVENOUS PRN
Status: DISCONTINUED | OUTPATIENT
Start: 2020-10-22 | End: 2020-10-22 | Stop reason: SDUPTHER

## 2020-10-22 RX ORDER — SODIUM CHLORIDE, SODIUM LACTATE, POTASSIUM CHLORIDE, CALCIUM CHLORIDE 600; 310; 30; 20 MG/100ML; MG/100ML; MG/100ML; MG/100ML
INJECTION, SOLUTION INTRAVENOUS CONTINUOUS
Status: CANCELLED | OUTPATIENT
Start: 2020-10-22

## 2020-10-22 RX ORDER — OXYCODONE HYDROCHLORIDE AND ACETAMINOPHEN 5; 325 MG/1; MG/1
2 TABLET ORAL PRN
Status: DISCONTINUED | OUTPATIENT
Start: 2020-10-22 | End: 2020-10-22 | Stop reason: HOSPADM

## 2020-10-22 RX ORDER — SODIUM CHLORIDE, SODIUM LACTATE, POTASSIUM CHLORIDE, CALCIUM CHLORIDE 600; 310; 30; 20 MG/100ML; MG/100ML; MG/100ML; MG/100ML
INJECTION, SOLUTION INTRAVENOUS
Status: COMPLETED
Start: 2020-10-22 | End: 2020-10-22

## 2020-10-22 RX ORDER — SODIUM CHLORIDE 0.9 % (FLUSH) 0.9 %
10 SYRINGE (ML) INJECTION EVERY 12 HOURS SCHEDULED
Status: CANCELLED | OUTPATIENT
Start: 2020-10-22

## 2020-10-22 RX ORDER — ONDANSETRON 2 MG/ML
4 INJECTION INTRAMUSCULAR; INTRAVENOUS
Status: DISCONTINUED | OUTPATIENT
Start: 2020-10-22 | End: 2020-10-22 | Stop reason: HOSPADM

## 2020-10-22 RX ORDER — LIDOCAINE HYDROCHLORIDE 10 MG/ML
2 INJECTION, SOLUTION INFILTRATION; PERINEURAL
Status: COMPLETED | OUTPATIENT
Start: 2020-10-22 | End: 2020-10-22

## 2020-10-22 RX ORDER — LIDOCAINE HYDROCHLORIDE 10 MG/ML
INJECTION, SOLUTION EPIDURAL; INFILTRATION; INTRACAUDAL; PERINEURAL PRN
Status: DISCONTINUED | OUTPATIENT
Start: 2020-10-22 | End: 2020-10-22 | Stop reason: ALTCHOICE

## 2020-10-22 RX ORDER — BUPIVACAINE HYDROCHLORIDE 5 MG/ML
INJECTION, SOLUTION EPIDURAL; INTRACAUDAL PRN
Status: DISCONTINUED | OUTPATIENT
Start: 2020-10-22 | End: 2020-10-22 | Stop reason: ALTCHOICE

## 2020-10-22 RX ADMIN — Medication 2 G: at 08:18

## 2020-10-22 RX ADMIN — LIDOCAINE HYDROCHLORIDE 2 ML: 10 INJECTION, SOLUTION EPIDURAL; INFILTRATION; INTRACAUDAL; PERINEURAL at 06:46

## 2020-10-22 RX ADMIN — PROPOFOL 600 MG: 10 INJECTION, EMULSION INTRAVENOUS at 08:25

## 2020-10-22 RX ADMIN — FAMOTIDINE 20 MG: 10 INJECTION INTRAVENOUS at 06:42

## 2020-10-22 RX ADMIN — SODIUM CHLORIDE, POTASSIUM CHLORIDE, SODIUM LACTATE AND CALCIUM CHLORIDE: 600; 310; 30; 20 INJECTION, SOLUTION INTRAVENOUS at 06:43

## 2020-10-22 RX ADMIN — ONDANSETRON 4 MG: 2 INJECTION, SOLUTION INTRAMUSCULAR; INTRAVENOUS at 08:25

## 2020-10-22 RX ADMIN — SODIUM CHLORIDE, SODIUM LACTATE, POTASSIUM CHLORIDE, CALCIUM CHLORIDE: 600; 310; 30; 20 INJECTION, SOLUTION INTRAVENOUS at 06:43

## 2020-10-22 RX ADMIN — LIDOCAINE HYDROCHLORIDE 100 MG: 20 INJECTION, SOLUTION INFILTRATION; PERINEURAL at 08:25

## 2020-10-22 RX ADMIN — LIDOCAINE HYDROCHLORIDE 2 ML: 10 INJECTION, SOLUTION INFILTRATION; PERINEURAL at 06:46

## 2020-10-22 ASSESSMENT — PULMONARY FUNCTION TESTS
PIF_VALUE: 5
PIF_VALUE: 4
PIF_VALUE: 1
PIF_VALUE: 4
PIF_VALUE: 1
PIF_VALUE: 1
PIF_VALUE: 31
PIF_VALUE: 1
PIF_VALUE: 2
PIF_VALUE: 1
PIF_VALUE: 31
PIF_VALUE: 1
PIF_VALUE: 46
PIF_VALUE: 30
PIF_VALUE: 1
PIF_VALUE: 2
PIF_VALUE: 1

## 2020-10-22 ASSESSMENT — ENCOUNTER SYMPTOMS: SHORTNESS OF BREATH: 0

## 2020-10-22 ASSESSMENT — PAIN SCALES - GENERAL
PAINLEVEL_OUTOF10: 0

## 2020-10-22 ASSESSMENT — LIFESTYLE VARIABLES: SMOKING_STATUS: 0

## 2020-10-22 ASSESSMENT — PAIN - FUNCTIONAL ASSESSMENT: PAIN_FUNCTIONAL_ASSESSMENT: 0-10

## 2020-10-22 NOTE — PROGRESS NOTES
Pt up and dressed with bleeding to dressing. Placed backed in bed with foot elevated and pressure applied. Dr Germaine Hyde called and message left.

## 2020-10-22 NOTE — H&P
HISTORY & PHYSICAL    Admit Date:  10/22/2020    Subjective:  39 y.o. male who is seen for evaluation of ulcer left foot. Regularly followed in the [facility]. Past Medical History:        Diagnosis Date    Acute osteomyelitis of right hallux ([address] 08/2019    CHF (congestive heart failure) (HCC)     Clostridium difficile infection 11/01/2016    Diabetic ulcer of right great toe associated with type 2 diabetes mellitus, with necrosis of bone ([address] 08/2019    Diet-controlled type 2 diabetes mellitus ([address]     Hyperlipidemia     Hypertension     Migraine     Tear of medial meniscus of left knee     Tobacco use     Toe osteomyelitis, left ([address] 7/24/2020       Past Surgical History:        Procedure Laterality Date    KNEE ARTHROSCOPY Left [id number]    LEFT KNEE ARTHROSCOPY , SYNOVECTOMY       OTHER SURGICAL HISTORY Left 07/24/2020    PARTIAL LEFT FOOT AMPUTATION    TOE AMPUTATION Right 8/23/2019    PARTIAL RIGHT FOOT AMPUTATION performed by Sherron Koenig DPM at [facility] Left 7/24/2020    PARTIAL LEFT FOOT AMPUTATION performed by Sherron Koenig DPM at [address] EXTRACTION         Current Medications:     ceFAZolin  2 g Intravenous Once       Allergies:  Januvia [sitagliptin];  Metformin and related; Vancomycin; and Mustard oil [allyl isothiocyanate]    Social History:    Social History     Tobacco Use    Smoking status: Former Smoker     Packs/day: 0.50     Years: 2.00     Pack years: 1.00     Last attempt to quit: 8/13/2005     Years since quitting: 15.2    Smokeless tobacco: Former User     Quit date: 8/13/2005    Tobacco comment: SMOKED OCCASIONALLY UNTIL 8 YEARS AGO   Substance Use Topics    Alcohol use: Yes     Comment: RARELY    Drug use: No       Family History:       Problem Relation Age of Onset    Diabetes Mother     High Blood Pressure Mother     High Cholesterol Mother     Diabetes Father     High Blood Pressure Father     High Cholesterol Father     Stroke Father     High Blood Pressure Sister     High Blood Pressure Maternal Uncle     High Cholesterol Maternal Uncle     High Blood Pressure Maternal Grandmother        Review of Systems    CONSTITUTIONAL:  negative  EYES:  negative  HEENT:  negative  RESPIRATORY:  negative  CARDIOVASCULAR:  negative  GASTROINTESTINAL:  negative  GENITOURINARY:  negative  INTEGUMENT/BREAST: positive  MUSCULOSKELETAL:  negative  NEUROLOGICAL:  positive for numbness      Objective:   BP (!) 142/98   Pulse 100   Temp 97 °F (36.1 °C) (Temporal)   Resp 16   Ht 6' (1.829 m)   Wt 260 lb (117.9 kg)   SpO2 100%   BMI 35.26 kg/m²     Data:  CBC: No results for input(s): WBC, HGB, HCT, MCV, PLT in the last 72 hours. BMP: No results for input(s): NA, K, CL, CO2, PHOS, BUN, CREATININE in the last 72 hours. Invalid input(s): CA  LIVER PROFILE: No results for input(s): AST, ALT, LIPASE, BILIDIR, BILITOT, ALKPHOS in the last 72 hours. Invalid input(s): AMYLASE,  ALB  PT/INR: No results for input(s): PROT, INR in the last 72 hours.   HgBA1c:  Lab Results   Component Value Date    LABA1C 11.7 07/24/2020       Cultures:     Imaging: xray left foot - osteomyelitis head 1st MH and base 2nd digit    Physical Exam:    General Appearance: alert and oriented to person, place and time, well developed and well- nourished, in no acute distress  Skin: warm and dry, no rash or erythema  Head: normocephalic and atraumatic  Eyes: pupils equal, round, and reactive to light, extraocular eye movements intact, conjunctivae normal  ENT: tympanic membrane, external ear and ear canal normal bilaterally, nose without deformity, nasal mucosa and turbinates normal without polyps  Neck: supple and non-tender without mass, no thyromegaly or thyroid nodules, no cervical lymphadenopathy  Pulmonary/Chest: clear to auscultation bilaterally- no wheezes, rales or rhonchi, normal air movement, no respiratory distress  Cardiovascular: normal rate, regular rhythm, normal S1 and S2, no murmurs, rubs, clicks, or gallops, distal pulses intact, no carotid bruits  Abdomen: soft, non-tender, non-distended, normal bowel sounds, no masses or organomegaly    DP/PT palpable left foot  Ulcer left 1st ray with + fibrotic tissue and red granulation tissue noted. Probes to bone.   + edema left 2nd digit with erythema noted. No purulence or abscess noted. Assessment:  Patient Active Problem List   Diagnosis Code    Hypertension I10    Uncontrolled type 2 diabetes mellitus with diabetic polyneuropathy ([facility]) E11.42, E11.65    Cardiomyopathy [facility] I42.9    Mixed hyperlipidemia E78.2    Diabetic ulcer of toe of left foot associated with type 2 diabetes mellitus, with necrosis of bone ([facility]) E11.621, L97.524    Allergic rhinitis J30.9    Arthritis M19.90    Osteoarthritis M19.90     Osteomyelitis left foot secondary to diabetes mellitus   diabetic foot ulcer left secondary to peripheral neuropathy   diabetes mellitus       Plan  Patient examined. Reviewed labs and imaging. Discussed risks, complications, alternatives and benefits with patient. Understands chance of nonhealing wound, infection, need for further surgery, loss of limb or life. Questions answered. Agrees to proceed with surgery. The patient was counseled at length about the risks of [place] Covid-19 during their perioperative period and any recovery window from their procedure. The patient was made aware that [place] Covid-19  may worsen their prognosis for recovering from their procedure  and lend to a higher morbidity and/or mortality risk. All material risks, benefits, and reasonable alternatives including postponing the procedure were discussed. The patient does wish to proceed with the procedure at this time.               Electronically signed by Inez Barrios DPM on 10/22/2020 at 8:14 AM.

## 2020-10-26 ENCOUNTER — HOSPITAL ENCOUNTER (OUTPATIENT)
Dept: WOUND CARE | Age: 45
Discharge: HOME | End: 2020-10-26
Payer: COMMERCIAL

## 2020-10-26 VITALS
HEART RATE: 108 BPM | TEMPERATURE: 98.6 F | RESPIRATION RATE: 20 BRPM | BODY MASS INDEX: 35.92 KG/M2 | HEIGHT: 72 IN | SYSTOLIC BLOOD PRESSURE: 144 MMHG | WEIGHT: 265.2 LBS | DIASTOLIC BLOOD PRESSURE: 95 MMHG

## 2020-10-26 PROBLEM — T86.821 FAILED SKIN GRAFT: Status: ACTIVE | Noted: 2020-10-26

## 2020-10-26 PROBLEM — M86.172 ACUTE OSTEOMYELITIS OF LEFT ANKLE OR FOOT (HCC): Status: ACTIVE | Noted: 2020-10-26

## 2020-10-26 PROBLEM — M86.172 ACUTE OSTEOMYELITIS OF METATARSAL BONE OF LEFT FOOT (HCC): Status: ACTIVE | Noted: 2020-10-26

## 2020-10-26 PROBLEM — T81.31XA SURGICAL WOUND DEHISCENCE, INITIAL ENCOUNTER: Status: ACTIVE | Noted: 2020-10-26

## 2020-10-26 LAB
A/G RATIO: 1.1 (ref 1.1–2.2)
ALBUMIN SERPL-MCNC: 3.9 G/DL (ref 3.4–5)
ALP BLD-CCNC: 169 U/L (ref 40–129)
ALT SERPL-CCNC: 43 U/L (ref 10–40)
ANION GAP SERPL CALCULATED.3IONS-SCNC: 13 MMOL/L (ref 3–16)
APTT: 31.8 SEC (ref 24.2–36.2)
AST SERPL-CCNC: 20 U/L (ref 15–37)
BASOPHILS ABSOLUTE: 0.1 K/UL (ref 0–0.2)
BASOPHILS RELATIVE PERCENT: 0.7 %
BILIRUB SERPL-MCNC: 0.8 MG/DL (ref 0–1)
BUN BLDV-MCNC: 15 MG/DL (ref 7–20)
C-REACTIVE PROTEIN: 107.4 MG/L (ref 0–5.1)
CALCIUM SERPL-MCNC: 9.1 MG/DL (ref 8.3–10.6)
CHLORIDE BLD-SCNC: 97 MMOL/L (ref 99–110)
CO2: 24 MMOL/L (ref 21–32)
CREAT SERPL-MCNC: 0.7 MG/DL (ref 0.9–1.3)
EOSINOPHILS ABSOLUTE: 0.2 K/UL (ref 0–0.6)
EOSINOPHILS RELATIVE PERCENT: 2 %
GFR AFRICAN AMERICAN: >60
GFR NON-AFRICAN AMERICAN: >60
GLOBULIN: 3.4 G/DL
GLUCOSE BLD-MCNC: 257 MG/DL (ref 70–99)
HCT VFR BLD CALC: 37.6 % (ref 40.5–52.5)
HEMOGLOBIN: 13.1 G/DL (ref 13.5–17.5)
INR BLD: 1.01 (ref 0.86–1.14)
LYMPHOCYTES ABSOLUTE: 2.1 K/UL (ref 1–5.1)
LYMPHOCYTES RELATIVE PERCENT: 18.4 %
MCH RBC QN AUTO: 31.4 PG (ref 26–34)
MCHC RBC AUTO-ENTMCNC: 34.9 G/DL (ref 31–36)
MCV RBC AUTO: 90 FL (ref 80–100)
MONOCYTES ABSOLUTE: 1.2 K/UL (ref 0–1.3)
MONOCYTES RELATIVE PERCENT: 10.5 %
NEUTROPHILS ABSOLUTE: 7.7 K/UL (ref 1.7–7.7)
NEUTROPHILS RELATIVE PERCENT: 68.4 %
PDW BLD-RTO: 13.1 % (ref 12.4–15.4)
PLATELET # BLD: 302 K/UL (ref 135–450)
PMV BLD AUTO: 7.3 FL (ref 5–10.5)
POTASSIUM SERPL-SCNC: 4.3 MMOL/L (ref 3.5–5.1)
PROTHROMBIN TIME: 11.7 SEC (ref 10–13.2)
RBC # BLD: 4.18 M/UL (ref 4.2–5.9)
SEDIMENTATION RATE, ERYTHROCYTE: 100 MM/HR (ref 0–15)
SODIUM BLD-SCNC: 134 MMOL/L (ref 136–145)
TOTAL CK: 26 U/L (ref 39–308)
TOTAL PROTEIN: 7.3 G/DL (ref 6.4–8.2)
WBC # BLD: 11.2 K/UL (ref 4–11)

## 2020-10-26 PROCEDURE — 86140 C-REACTIVE PROTEIN: CPT

## 2020-10-26 PROCEDURE — 36415 COLL VENOUS BLD VENIPUNCTURE: CPT

## 2020-10-26 PROCEDURE — 99213 OFFICE O/P EST LOW 20 MIN: CPT

## 2020-10-26 PROCEDURE — 99215 OFFICE O/P EST HI 40 MIN: CPT | Performed by: INTERNAL MEDICINE

## 2020-10-26 PROCEDURE — 85610 PROTHROMBIN TIME: CPT

## 2020-10-26 PROCEDURE — 80053 COMPREHEN METABOLIC PANEL: CPT

## 2020-10-26 PROCEDURE — 83036 HEMOGLOBIN GLYCOSYLATED A1C: CPT

## 2020-10-26 PROCEDURE — 82550 ASSAY OF CK (CPK): CPT

## 2020-10-26 PROCEDURE — 85730 THROMBOPLASTIN TIME PARTIAL: CPT

## 2020-10-26 PROCEDURE — 85025 COMPLETE CBC W/AUTO DIFF WBC: CPT

## 2020-10-26 PROCEDURE — 85652 RBC SED RATE AUTOMATED: CPT

## 2020-10-26 RX ORDER — FLUTICASONE PROPIONATE 50 MCG
2 SPRAY, SUSPENSION (ML) NASAL DAILY
COMMUNITY
End: 2020-11-19 | Stop reason: ALTCHOICE

## 2020-10-26 RX ORDER — LIDOCAINE 40 MG/G
CREAM TOPICAL PRN
Status: DISCONTINUED | OUTPATIENT
Start: 2020-10-26 | End: 2020-10-27 | Stop reason: HOSPADM

## 2020-10-26 ASSESSMENT — PAIN DESCRIPTION - LOCATION: LOCATION: FOOT

## 2020-10-26 ASSESSMENT — PAIN DESCRIPTION - DESCRIPTORS: DESCRIPTORS: BURNING

## 2020-10-26 ASSESSMENT — PAIN SCALES - GENERAL: PAINLEVEL_OUTOF10: 2

## 2020-10-26 ASSESSMENT — PAIN DESCRIPTION - ORIENTATION: ORIENTATION: LEFT

## 2020-10-26 NOTE — PROGRESS NOTES
[facility] will be drawing weekly labs and changing PICC line dressing.

## 2020-10-26 NOTE — PROGRESS NOTES
[facility] [employer] called and stated that they do not have staff to take patient. Called [employer] and spoke with Diogenes Cerda. She states that they are calling some companies and will let us know who can take patient.

## 2020-10-26 NOTE — CONSULTS
[facility] HBO Consult Note      Laquita Oneill     : 1975    DATE OF VISIT:  10/26/2020    Subjective:     Laquita Oneill is a 39 y.o. male who has a chief complaint of a  diabetic ulcer located on the foot (left ). Dr. Landy Pacheco requested a consultation regarding the possible utility of hyperbaric oxygen therapy for his diagnosis of a Reyna 3 diabetic foot ulcer, along with a compromised soft tissue flap at his 2nd toe amputation site. Current complaint of pain in this ulcer? yes. Quality of pain: aching, burning and dull  Timing: constant and stable  Severity: mild-moderate  Associated Signs/Symptoms: swelling, redness, drainage (moderate, serosanguinous to bloody), numbness and tingling  Other significant symptoms or pertinent wound history: I had seen Mr. Lloyd Lara several times over the summer, when he presented to the ER with wet gangrene of the left hallux, as a complication of a recurrent neuropathic ulcer. He was treated with IV antibiotics and taken to the OR urgently that evening for an open hallux amputation. All osteomyelitis was felt to be resected at that time, so he was discharged home a matter of days later, with oral antibiotics to complete treatment of his soft tissue infection, and then plans to get the wound to heal by secondary intention. I actually spoke with him about hyperbaric oxygen therapy after about a month of standard diabetic foot ulcer care, and recommended it to him at that time. He was showing some modest signs of improvement, but not as much as we were hoping for, including ongoing exposure of his 1st metatarsal head. He declined HBO therapy at that time, because it would have been difficult to manage with his work schedule. For much of the next month or two, he continued to make some slow, stuttering progress toward healing, but then last week he noticed increased pain, swelling, redness and drainage.  Dr. Adele Rodrigez diagnosed him with a wound infection and foot cellulitis and prescribed empiric Augmentin. An Xray showed changes of osteomyelitis in that 1st metatarsal, so he was taken back to the OR last Thursday for debridement, resection of a portion of that 1st metatarsal, bone cultures, and also amputation of the 2nd toe, which had started to deteriorate as well (from infection-related necrosis). Both sites were closed primarily (the 1st ray with a straight suture line and the 2nd toe amputation site with a small rotational flap). Unfortunately shortly after surgery the 1st ray site dehisced and bled (presumably from trauma or pressure), and now the 2nd toe amputation site looks quite compromised. He's had some low grade fevers, no shaking chills. Tolerating Augmentin well, no sore throat or mouth, no drug rash or pruritus, no N/V/D. Has noticed his blood glucoses being higher in the last 1-2 weeks from the infection, but states that fasting glucoses were often 110-150 in the weeks prior, which is a big improvement from earlier this summer. Dr. Rosalia Reaves asked me to see him today partly to discuss HBO therapy again, also for advice on ongoing Abx mgmt. Additional ulcer(s) noted? no.      Mr. Lacey Orozco has a past medical history of Acute osteomyelitis of right hallux ([address] CHF (congestive heart failure) ([address] Clostridium difficile infection, Diabetic ulcer of right great toe associated with type 2 diabetes mellitus, with necrosis of bone ([address] Diet-controlled type 2 diabetes mellitus ([address] Hyperlipidemia, Hypertension, Migraine, Tear of medial meniscus of left knee, Tobacco use, and Toe osteomyelitis, left ([address] The CHF was an acute episode a number of years ago, which sounds like it was related to a viral myocarditis, with his EF normalizing in a few months, and no symptoms of CHF in recent years.  He also had recurrent ear infections as a child, and describes symptoms that make me think he may have had a perforated TM at one point. He has a past surgical history that includes Tonsillectomy; [place] tooth extraction; Knee arthroscopy (Left, [id number]); Toe amputation (Right, 8/23/2019); other surgical history (Left, 07/24/2020); Toe amputation (Left, 7/24/2020); and Toe amputation (Left, 10/22/2020). His family history includes Diabetes in his father and mother; High Blood Pressure in his father, maternal grandmother, maternal uncle, mother, and sister; High Cholesterol in his father, maternal uncle, and mother; Stroke in his father. Mr. Mari Paris reports that he quit smoking about 15 years ago. He has a 1.00 pack-year smoking history. He quit smokeless tobacco use about 15 years ago. He reports current alcohol use. He reports that he does not use drugs. His current medication list consists of Aspirin-Acetaminophen-Caffeine, HYDROcodone-acetaminophen, Insulin Glargine (1 Unit Dial), Insulin Syringe-Needle U-100, Lancets, PARoxetine, ammonium lactate, amoxicillin-clavulanate, blood glucose test strips, carvedilol, furosemide, gabapentin, glimepiride, insulin lispro, loratadine, losartan, sildenafil, simvastatin, tiZANidine, and traZODone. He tells me that he also takes Flonase daily, this time of year. Allergies: Januvia [sitagliptin]; Metformin and related; Vancomycin; and Mustard oil [allyl isothiocyanate]    Pertinent items from the review of systems are discussed in the HPI; the remainder of the ROS was reviewed and is negative, apart from some nasal and sinus and ear congestion, which he thinks is from seasonal allergies. Objective:     Vitals:    10/26/20 0806   BP: (!) 144/95   Pulse: 108   Resp: 20   Temp: 98.6 °F (37 °C)   TempSrc: Oral   Weight: 265 lb 3.2 oz (120.3 kg)   Height: 6' (1.829 m)     Left JOEL was 1.09 back in August (arm systolic 169, left ankle 366 and 142).       Constitutional:  well-developed, well-nourished, overweight, NAD  Psychiatric:  oriented to person, place and time; mood and affect appropriate for the situation   Eyes:  pupils equal, round and reactive to light; sclerae anicteric, conjunctivae not pale  ENT: no thrush or oral ulcers; TMs visible, no definite perforation seen now; both TMs with a bit of scar tissue (L>R), and both with some mild injection and erythema (R>L)  Lungs: clear to auscultation B/L  Cardiovascular:  bilateral pedal pulses palpable; mild BL lower extremity edema, feet warm, brisk cap refill, good distal hair growth  Lymphatic:  no inguinal or popliteal adenopathy, no angitis; still a bit of cellulitis of the foot, with moderate edema there  Musculoskeletal:  no clubbing, cyanosis or petechiae; RLE and LLE with no gross effusions, joint misalignment or acute arthritis; evidence of prior right hallux amp as well  Shanita-ulcer skin: indurated, pink, erythematous , warm and (at the 2nd toe) some maceration, dull purple color, congestion, and not quite as warm as around the metatarsal wound. Ulcer(s): first ray site dehisced, mostly red, a bit of bloody drainage, obviously still exposed base of the 1st met, minimal necrotic debris; 2nd toe amp site still sutured, but moist, biofilm, a bit pale and dull purple color, not as warm as the rest of the foot. Photos also saved in electronic chart.     Today's Wound Measurements:  Wound 07/31/20 Proximal #1, Left Great toe amp site, DFU, Reyna 4, onset 7/24/20-Wound Length (cm): 4.5 cm    Wound 07/31/20 Proximal #1, Left Great toe amp site, DFU, Reyna 4, onset 7/24/20-Wound Width (cm): 1 cm    Wound 07/31/20 Proximal #1, Left Great toe amp site, DFU, Reyna 4, onset 7/24/20-Wound Depth (cm): (DEE Chandra MD to measure )  _____________________________    Lab Results   Component Value Date    LABALBU 3.9 10/26/2020     Lab Results   Component Value Date    CREATININE 0.7 (L) 10/26/2020     Lab Results   Component Value Date    HGB 13.1 (L) 10/26/2020     Lab Results   Component Value Date    LABA1C 11.7 07/24/2020     Hgb A1c just repeated today, and down nicely to 7.8%.   _____________________________    CXR: Aug 10, 2018 -- The exam was performed in expiration resulting in vascular crowding.  The heart and pulmonary vascularity are within normal limits.  There is patchy left perihilar increased density consistent with atelectasis or mild pneumonia. Camille Jany is no pleural effusion. EKG: Aug 1, 2019 -- Sinus tachycardia at 108 bpm with occasional premature ventricular complexes; minimal voltage criteria for LVH, may be normal variant; nonspecific ST abnormality. Other diagnostics:     Sep 2, 2014 TTE -- The left ventricular systolic function is severely reduced with an ejection fraction of 20-25%. Left ventricle size is normal. Global left ventricular hypokinesia is present. Mild LVH. The mitral valve is structurally and functionally normal. Trace mitral valve regurgitation noted. The left atrium is mildly dilated. Normal right ventricular size and function. The tricuspid valve is structurally and functionally normal. No evidence of tricuspid valve regurgitation. There is left pleural effusion. Sep 5, 2014 stress test -- Sinus tachycardia with frequent premature ventricular complexes. [employer] was used as a stress agent due to his inability to exercise. Sinus tachycardia with frequent premature ventricular complexes. Patient complained of SOB, vertigo, and hot feeling but all symptoms resolved in recovery on their own. No EKG changes of stress induced left ventricular ischemia. Myoview was injected, with results as follows: There are no reversible perfusion abnormalities within the left ventricular myocardium. Two tiny fixed defects are seen near the apex which may represent attenuation from chest wall. Prior infarcts are not excluded and correlation with ECG findings is advised. The left ventricle is dilated. Severe hypokinesia is observed globally. The ejection fraction is significantly reduced, calculated at 26%.      Dec 15, 2014 TTE -- This is a limited study for left ventricular function. Left ventricular function is low normal with ejection fraction estimated at 50 %. No regional wall motion abnormalities are noted. Jul 24 XR -- Suspect osteomyelitis medial base of the 1st distal phalanx with moderate soft tissue edema and soft tissue emphysema. Jul 24 -- OR Cx with Group B Strep, Strep constellatus, Prevotella, Peptoniphilus. July 24 -- OR path with benign inflamed skin and subcutaneous tissue with ulceration and gangrenous necrosis. Acute osteomyelitis. Oct 19 XR -- Interval amputation of the 1st digit with suspected osteomyelitis involving the lateral head of the remaining 1st metatarsal.  Additional erosions with   questionable possible fracture through the proximal shaft of the 2nd proximal phalanx.  This is suspected to represent an additional area of osteomyelitis or could be sequela of a prior traumatic fracture. Oct 22 -- OR Cx with Corynebacterium, Pseudomonas (R) CFP and (I) Cipro, Alcaligenes (R) gent, and a standard (S) Enterobacter. Oct 22 -- OR path with moderate acute and chronic osteomyelitis involving 2 separate bone fragments. Distal digit not involved by osteomyelitis. Oct 22 XR -- Status post partial resection of the 1st metatarsal and amputation of the 2nd digit. Oct 26 -- updated baseline CPK 26, alk phos 169, cRP 107.4, WBC 11.2, plts 302k, WBC diff normal, .      Assessment:     Patient Active Problem List   Diagnosis Code    Hypertension I10    Uncontrolled type 2 diabetes mellitus with diabetic polyneuropathy ([facility]) E11.42, E11.65    Cardiomyopathy ([facility] I42.9    Mixed hyperlipidemia E78.2    Diabetic ulcer of toe of left foot associated with type 2 diabetes mellitus, with necrosis of bone ([facility]) E11.621, L97.524    Allergic rhinitis J30.9    Osteoarthritis M19.90    Acute osteomyelitis of metatarsal bone of left foot ([facility]) M86.172    Acute osteomyelitis of left ankle or foot [facility]) [id number]    Surgical wound dehiscence, initial encounter T81.31XA    Compromised soft 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 toe amputation site T86.821       Assessment of today's active condition(s): DM2, previously poorly controlled but recently much improved, complicated by dense neuropathy, no signs of significant PAD. Presented in July with recurrent left hallux neuropathic callus and ulcer, complicated by sepsis, cellulitis-abscess-acute osteo and wet gangrene, underwent open hallux amputation. Wound progressed slowly afterwards, did not heal, was complicated by further soft tissue infection and osteomyelitis, now with resection of some of the 1st metatarsal and amputation of the 2nd toe, but the 1st ray wound has dehisced, the 2nd toe amputation flap appears compromised, and there is presumed residual osteomyelitis. Factors contributing to occurrence and/or persistence of the chronic ulcer include edema, diabetes, poor glucose control, shear force, obesity and decreased tissue oxygenation. I believe HBOT is indicated as an important adjunctive therapy in this case because of Mr. Dario Torres refractory condition, to speed healing and to decrease the chance of serious complications including progressive infection, further necrosis, and the need for a more proximal level of amputation. Specifically, the goals of HBOT in this case are to enhance angiogenesis to build granulation tissue, decrease wound depth and volume, induce hyperoxia in the compromised flap and try to convert some of that vulnerable tissue away from necrosis and toward viability, and also to assist with control of edema and infection. Due to the potential adverse effects of HBO therapy, I reviewed some particular aspects of Mr. Dario Torres medical history.  There is no history of idiopathic epilepsy, secondary seizures, stroke, CNS surgery or bleeding, recent head trauma, frequent hypoglycemia, untreated cataracts, optic neuritis, bleomycin-associated days. He's had daptomycin before, so that therapy can just start at home, but I don't have a record of him receiving meropenem before, so will give him a first dose here in the infusion room, before starting home therapy. [facility] contacted about IV antibiotic orders and labs, and will have home-care involved for PICC teaching, weekly labs, PICC dressing changes -- no BioPatch, since those are not known to be compatible with HBOT. Local care per Dr. Tree Fraser -- Betadine to 2nd toe amp site and periwound of major wound; iodoform packing, 4x4, ABD, Kerlix, Coban. Keep up the great work with glucoses. Keep pushing protein intake. Offloading just with an accommodative post-op shoe for now, since the main surgical wound is dorsal and the amputation flap is just distal (no wounds on the plantar aspect of his foot); important that he minimize his steps on the foot, to reduce swelling and repetitive trauma; he can use crutches around the home, wheelchair to get back and forth to HBO each day. Keep leg elevated whenever possible.      Electronically signed by Riley Aly MD on 10/26/2020 at 5:36 PM.

## 2020-10-26 NOTE — PROGRESS NOTES
[facility] Progress Note      Mike Siddiqui     : 1975    DATE OF VISIT:  10/26/2020    Subjective:     Mike Siddiqui is a 39 y.o. male who has a chief complaint of a diabetic ulcer located on the left foot. Doing well since surgery. Has been staying off his foot. Mr. Haroon Ashley has a past medical history of Acute osteomyelitis of right hallux ([facility] CHF (congestive heart failure) ([facility] Clostridium difficile infection, Diabetic ulcer of right great toe associated with type 2 diabetes mellitus, with necrosis of bone ([address] Diet-controlled type 2 diabetes mellitus ([facility] Hyperlipidemia, Hypertension, Migraine, Tear of medial meniscus of left knee, Tobacco use, and Toe osteomyelitis, left ([address] He has a past surgical history that includes Tonsillectomy; [place] tooth extraction; Knee arthroscopy (Left, [id number]); Toe amputation (Right, 2019); other surgical history (Left, 2020); Toe amputation (Left, 2020); and Toe amputation (Left, 10/22/2020). His family history includes Diabetes in his father and mother; High Blood Pressure in his father, maternal grandmother, maternal uncle, mother, and sister; High Cholesterol in his father, maternal uncle, and mother; Stroke in his father. Mr. Haroon Ashley reports that he quit smoking about 15 years ago. He has a 1.00 pack-year smoking history. He quit smokeless tobacco use about 15 years ago. He reports current alcohol use. He reports that he does not use drugs. His current medication list consists of Aspirin-Acetaminophen-Caffeine, HYDROcodone-acetaminophen, Insulin Glargine (1 Unit Dial), Insulin Syringe-Needle U-100, Lancets, PARoxetine, ammonium lactate, amoxicillin-clavulanate, blood glucose test strips, carvedilol, furosemide, gabapentin, glimepiride, insulin lispro, loratadine, losartan, sildenafil, simvastatin, tiZANidine, and traZODone. Allergies: Januvia [sitagliptin];  Metformin and related; Vancomycin; and Mustard oil [allyl isothiocyanate]    Pertinent items from the review of systems are discussed in the HPI; the remainder of the ROS was reviewed and is negative. Objective:     BP (!) 144/95   Pulse 108   Temp 98.6 °F (37 °C) (Oral)   Resp 20   Ht 6' (1.829 m)   Wt 265 lb 3.2 oz (120.3 kg)   BMI 35.97 kg/m²   General Appearance: alert and oriented to person, place and time, well developed and well- nourished, in no acute distress  Skin: warm and dry, no rash or erythema  Head: normocephalic and atraumatic  Eyes: pupils equal, round, and reactive to light, extraocular eye movements intact, conjunctivae normal  ENT: tympanic membrane, external ear and ear canal normal bilaterally, nose without deformity, nasal mucosa and turbinates normal without polyps  Neck: supple and non-tender without mass, no thyromegaly or thyroid nodules, no cervical lymphadenopathy  Pulmonary/Chest: clear to auscultation bilaterally- no wheezes, rales or rhonchi, normal air movement, no respiratory distress  Cardiovascular: normal rate, regular rhythm, normal S1 and S2, no murmurs, rubs, clicks, or gallops, distal pulses intact, no carotid bruits  Abdomen: soft, non-tender, non-distended, normal bowel sounds, no masses or organomegaly. Dorsalis pedis pulse left palpable  Posterior tibial pulse left palpable  Dorsalis pedis pulse right palpable  Posterior tibial pulse right palpable        Ulcer on the left hallux amputation site with minimal fibrotic tissue, red granulation tissue, mild serous drainage, no hyperkeratotic rim, no undermining, no tunneling, no purulence, no malodor, no eschar, no periwound maceration, moderate periwound erythema, mild edema, no crepitus, no increase in skin temperature, ulcer probes to soft tissue only    + hematoma in base of 1st ray wound    Sutures intact proximal aspect 1st ray and 2nd digit amputation site.      Today's ulcer measurements are in the wound documentation flowsheet.      Wound measurements:  Wound 07/31/20 Proximal #1, Left Great toe amp site, DFU, Reyna 4, onset 7/24/20-Wound Length (cm): 4.5 cm    Wound 07/31/20 Proximal #1, Left Great toe amp site, DFU, Reyna 4, onset 7/24/20-Wound Width (cm): 1 cm    Wound 07/31/20 Proximal #1, Left Great toe amp site, DFU, Reyna 4, onset 7/24/20-Wound Depth (cm): (DEE - MD to measure )    LABS  Lab Results   Component Value Date    LABA1C 11.7 07/24/2020     Xray left foot - postop changes    Culture -   Pseudomonas aeruginosa (1)     Antibiotic  Interpretation  SIMONE  Status     cefepime  Resistant  >16  mcg/mL      ciprofloxacin  Intermediate  2  mcg/mL      gentamicin  Sensitive  <=4  mcg/mL      meropenem  Sensitive  <=1  mcg/mL      piperacillin-tazobactam  Sensitive  <=16  mcg/mL      tobramycin  Sensitive  <=4  mcg/mL      Enterobacter cloacae complex (2)     Antibiotic  Interpretation  SIMONE  Status     amoxicillin-clavulanate  Resistant  >16/8  mcg/mL      ampicillin  Resistant  >16  mcg/mL      ceFAZolin  Resistant  >16  mcg/mL      cefepime  Sensitive  <=2  mcg/mL      cefTRIAXone  Sensitive  <=1  mcg/mL      cefuroxime  Sensitive  8  mcg/mL      ciprofloxacin  Sensitive  <=1  mcg/mL      ertapenem  Sensitive  <=0.5  mcg/mL      gentamicin  Sensitive  <=4  mcg/mL      meropenem  Sensitive  <=1  mcg/mL      piperacillin-tazobactam  Sensitive  <=16  mcg/mL      trimethoprim-sulfamethoxazole  Sensitive  <=2/38  mcg/mL      Alcaligenes faecalis (3)     Antibiotic  Interpretation  SIMONE  Status     cefepime  Sensitive  4  mcg/mL      cefTRIAXone  Sensitive  <=1  mcg/mL      ciprofloxacin  Sensitive  <=1  mcg/mL      gentamicin  Resistant  <=4  mcg/mL      meropenem  Sensitive  <=1  mcg/mL      piperacillin-tazobactam  Sensitive  <=16  mcg/mL      tobramycin  Sensitive  <=4  mcg/mL      trimethoprim-sulfamethoxazole  Sensitive  <=2/38  mcg/mL          Path - chronic and acute osteomyelitis      Assessment: Patient Active Problem List   Diagnosis Code    Hypertension I10    Uncontrolled type 2 diabetes mellitus with diabetic polyneuropathy ([facility]) E11.42, E11.65    Cardiomyopathy ([facility] I42.9    Mixed hyperlipidemia E78.2    Diabetic ulcer of toe of left foot associated with type 2 diabetes mellitus, with necrosis of bone ([facility]) E11.621, L97.524    Allergic rhinitis J30.9    Arthritis M19.90    Osteoarthritis M19.90       Assessment of today's active condition(s): cellulitis left foot - improving, osteomyelitis left foot, diabetic foot ulcer left hallux amputation, diabetes mellitus. Factors contributing to occurrence and/or persistence of the chronic ulcer include diabetes and poor glucose control. Sharp debridement is not indicated today, based upon the exam findings in the ulcer(s) above. Discharge plan:     Treatment in the wound care center today:  . Home treatment: good handwashing before and after any dressing changes. Cleanse ulcer with saline or soap & water before dressing change. May use Vaseline (petrolatum), Aquaphor, Aveeno, CeraVe, Cetaphil, Eucerin, Lubriderm, etc for dry skin. Dressing type for home: Iodoform and dry dressing applied to be changed on Thursday. Written discharge instructions given to patient. Offload ulcer(s) as directed. Elevate leg(s) as directed. Follow up in 1 week. Needs to control glucose levels. Consult to Dr. Darci Dwyer for ID for osteomyelitis. He discussed HBO as well.        Electronically signed by Thierry Ramos DPM on 10/26/2020 at 9:25 AM.

## 2020-10-27 ENCOUNTER — HOSPITAL ENCOUNTER (OUTPATIENT)
Dept: NURSING | Age: 45
Setting detail: INFUSION SERIES
Discharge: HOME | End: 2020-10-27
Payer: COMMERCIAL

## 2020-10-27 ENCOUNTER — HOSPITAL ENCOUNTER (OUTPATIENT)
Dept: GENERAL RADIOLOGY | Age: 45
Discharge: HOME | End: 2020-10-27
Payer: COMMERCIAL

## 2020-10-27 ENCOUNTER — HOSPITAL ENCOUNTER (OUTPATIENT)
Dept: INTERVENTIONAL RADIOLOGY/VASCULAR | Age: 45
Discharge: HOME | End: 2020-10-27
Payer: COMMERCIAL

## 2020-10-27 VITALS
SYSTOLIC BLOOD PRESSURE: 174 MMHG | BODY MASS INDEX: 35.49 KG/M2 | HEART RATE: 69 BPM | DIASTOLIC BLOOD PRESSURE: 124 MMHG | WEIGHT: 262 LBS | TEMPERATURE: 99.1 F | RESPIRATION RATE: 16 BRPM | HEIGHT: 72 IN

## 2020-10-27 DIAGNOSIS — M86.172 ACUTE OSTEOMYELITIS OF LEFT ANKLE OR FOOT (HCC): Primary | ICD-10-CM

## 2020-10-27 PROBLEM — M86.672 CHRONIC OSTEOMYELITIS OF LEFT FOOT (HCC): Status: ACTIVE | Noted: 2020-10-27

## 2020-10-27 LAB
ESTIMATED AVERAGE GLUCOSE: 177.2 MG/DL
HBA1C MFR BLD: 7.8 %

## 2020-10-27 PROCEDURE — C1751 CATH, INF, PER/CENT/MIDLINE: HCPCS

## 2020-10-27 PROCEDURE — 2580000003 HC RX 258: Performed by: INTERNAL MEDICINE

## 2020-10-27 PROCEDURE — 99201 HC NEW PT, OUTPT VISIT LEVEL 1: CPT

## 2020-10-27 PROCEDURE — 96365 THER/PROPH/DIAG IV INF INIT: CPT

## 2020-10-27 PROCEDURE — 36573 INSJ PICC RS&I 5 YR+: CPT

## 2020-10-27 PROCEDURE — 6360000002 HC RX W HCPCS: Performed by: INTERNAL MEDICINE

## 2020-10-27 PROCEDURE — 71045 X-RAY EXAM CHEST 1 VIEW: CPT

## 2020-10-27 RX ORDER — HYDROCODONE BITARTRATE AND ACETAMINOPHEN 5; 325 MG/1; MG/1
1-2 TABLET ORAL EVERY 4 HOURS PRN
Qty: 30 TABLET | Refills: 0 | Status: SHIPPED | OUTPATIENT
Start: 2020-10-27 | End: 2020-11-10

## 2020-10-27 RX ADMIN — MEROPENEM 1 G: 1 INJECTION, POWDER, FOR SOLUTION INTRAVENOUS at 11:11

## 2020-10-27 ASSESSMENT — PAIN DESCRIPTION - DESCRIPTORS: DESCRIPTORS: CONSTANT;THROBBING;BURNING

## 2020-10-27 ASSESSMENT — PAIN SCALES - GENERAL: PAINLEVEL_OUTOF10: 8

## 2020-10-27 ASSESSMENT — PAIN DESCRIPTION - ORIENTATION: ORIENTATION: LEFT

## 2020-10-27 ASSESSMENT — PAIN DESCRIPTION - LOCATION: LOCATION: FOOT;KNEE

## 2020-10-27 ASSESSMENT — PAIN DESCRIPTION - PAIN TYPE: TYPE: CHRONIC PAIN

## 2020-10-27 ASSESSMENT — PAIN DESCRIPTION - FREQUENCY: FREQUENCY: CONTINUOUS

## 2020-10-27 ASSESSMENT — PAIN DESCRIPTION - ONSET: ONSET: ON-GOING

## 2020-10-27 NOTE — PROGRESS NOTES
Patient has tolerated his Merrem 1 gm per PICC line. Resting in chair talking with discharge planning about his infusions and pain. Will monitor.

## 2020-10-27 NOTE — PROGRESS NOTES
Patient given discharge instructions verbally and written. Verbalized understanding back. Patient taken out in a wheelchair to car per nurse. Left in stable condition.

## 2020-10-28 ENCOUNTER — TELEPHONE (OUTPATIENT)
Dept: INTERNAL MEDICINE CLINIC | Age: 45
End: 2020-10-28

## 2020-10-28 ENCOUNTER — HOSPITAL ENCOUNTER (OUTPATIENT)
Dept: HYPERBARIC MEDICINE | Age: 45
Discharge: HOME | End: 2020-10-28
Payer: COMMERCIAL

## 2020-10-28 VITALS
TEMPERATURE: 98.6 F | DIASTOLIC BLOOD PRESSURE: 91 MMHG | RESPIRATION RATE: 18 BRPM | HEART RATE: 99 BPM | SYSTOLIC BLOOD PRESSURE: 152 MMHG

## 2020-10-28 LAB
GLUCOSE BLD-MCNC: 272 MG/DL (ref 70–99)
GLUCOSE BLD-MCNC: 347 MG/DL (ref 70–99)
PERFORMED ON: ABNORMAL
PERFORMED ON: ABNORMAL

## 2020-10-28 PROCEDURE — G0277 HBOT, FULL BODY CHAMBER, 30M: HCPCS

## 2020-10-28 PROCEDURE — 99183 HYPERBARIC OXYGEN THERAPY: CPT | Performed by: EMERGENCY MEDICINE

## 2020-10-28 PROCEDURE — 99213 OFFICE O/P EST LOW 20 MIN: CPT

## 2020-10-28 RX ORDER — INSULIN LISPRO 100 [IU]/ML
INJECTION, SOLUTION INTRAVENOUS; SUBCUTANEOUS
Qty: 5 PEN | Refills: 0 | Status: SHIPPED | OUTPATIENT
Start: 2020-10-28 | End: 2020-11-04

## 2020-10-28 ASSESSMENT — PAIN DESCRIPTION - FREQUENCY
FREQUENCY: CONTINUOUS
FREQUENCY: CONTINUOUS

## 2020-10-28 ASSESSMENT — PAIN DESCRIPTION - DESCRIPTORS
DESCRIPTORS: THROBBING;BURNING
DESCRIPTORS: BURNING

## 2020-10-28 ASSESSMENT — PAIN DESCRIPTION - PROGRESSION
CLINICAL_PROGRESSION: GRADUALLY WORSENING
CLINICAL_PROGRESSION: NOT CHANGED

## 2020-10-28 ASSESSMENT — PAIN DESCRIPTION - LOCATION
LOCATION: FOOT;LEG
LOCATION: LEG;FOOT

## 2020-10-28 ASSESSMENT — PAIN DESCRIPTION - ONSET
ONSET: ON-GOING
ONSET: ON-GOING

## 2020-10-28 ASSESSMENT — PAIN DESCRIPTION - ORIENTATION
ORIENTATION: LEFT
ORIENTATION: LEFT

## 2020-10-28 ASSESSMENT — PAIN DESCRIPTION - PAIN TYPE
TYPE: ACUTE PAIN
TYPE: ACUTE PAIN

## 2020-10-28 ASSESSMENT — PAIN SCALES - GENERAL
PAINLEVEL_OUTOF10: 1
PAINLEVEL_OUTOF10: 1

## 2020-10-28 ASSESSMENT — PAIN - FUNCTIONAL ASSESSMENT
PAIN_FUNCTIONAL_ASSESSMENT: PREVENTS OR INTERFERES SOME ACTIVE ACTIVITIES AND ADLS
PAIN_FUNCTIONAL_ASSESSMENT: PREVENTS OR INTERFERES SOME ACTIVE ACTIVITIES AND ADLS

## 2020-10-28 NOTE — PROGRESS NOTES
[address]  Hyperbaric Oxygen    Jayme Wan     : 1975    DATE OF VISIT:  10/28/2020    Subjective     Jayme Wan is a 39 y.o. male who  has a past medical history of Acute osteomyelitis of right hallux ([facility] [address] (2019), CHF (congestive heart failure) ([facility] [facility] (2014), Clostridium difficile infection (2016), Diabetic ulcer of right great toe associated with type 2 diabetes mellitus, with necrosis of bone ([facility] (2019), Migraine, Possible perforated tympanic membrane, Recurrent otitis media, Tear of medial meniscus of left knee, Tobacco use, and Toe osteomyelitis, left ([facility] (2020). He presents to the [facility] [place] today for hyperbaric oxygen treatment of his diabetic foot ulcer/compromised skin flap/graft   , which is refractory to standard therapy for 30 days. Patient denies fever. Patient denies nausea, vomiting or diarrhea; no ear troubles and no other new complaints; no fears or anxiety regarding treatment today. Objective     Vitals:    10/28/20 1104   BP: (!) 142/97   Pulse: 107   Resp: 18   Temp: 99.2 °F (37.3 °C)   TempSrc: Oral       General:  Alert, cooperative, no distress. Ears: External otic canals are within acceptable limits. Teed grade 0 on the right mild RUQ erythema and Teed 0 on the left mild RUQ erythema  pre-treatment. Teed grade 0 mild RUQ erythema on the right and Teed 0 mild RUQ erythema on the left post-treatment. Lungs:  Clear to auscultation bilaterally. Ulcer: Not examined today in HBO, if applicable. No results for input(s): POCGLU in the last 72 hours. Assessment     Jayme Wan is a 39 y.o. male who presented to the [facility] today for hyperbaric oxygen treatment #1 of 30 for the diagnosis as stated above. Treatment given at 2 VINCENT for 90 minutes, with no air breaks.    today, including compression, 100% oxygen at pressure, air breaks if applicable, and decompression. Patient Active Problem List   Diagnosis Code    Hypertension I10    Uncontrolled type 2 diabetes mellitus with diabetic polyneuropathy ([facility]) E11.42, E11.65    Cardiomyopathy ([facility] I42.9    Mixed hyperlipidemia E78.2    Diabetic ulcer of left foot associated with type 2 diabetes mellitus, with necrosis of bone ([facility]) E11.621, L97.524    Cellulitis of left foot L03. 116    Allergic rhinitis J30.9    Osteoarthritis M19.90    Acute osteomyelitis of metatarsal bone of left foot ([facility]) M86.172    Acute osteomyelitis of left ankle or foot ([facility]) M86.172    Surgical wound dehiscence, initial encounter T81.31XA    Compromised soft 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 toe amputation site T86.821    Chronic osteomyelitis of left foot ([facility] J62.461       In my clinical judgement, ongoing HBO therapy is necessary at this time, given a threat to patient function, limb or life from the current condition. Adjunctive Rx, objective weekly progress and goals of Rx will periodically be updated, on Mondays. Plan     1. Hyperbaric Oxygen - Zakiya Osei tolerated the treatment well today without complications. Continue HBO treatment as outlined above. 2. Other -     I was present on these premises and immediately available to furnish assistance & direction throughout the procedure.      -- Electronically signed by Maximo Dennis MD on 10/28/2020 at 2:15 PM

## 2020-10-28 NOTE — PLAN OF CARE
Pt to the [facility] for HBOT orientation and first dive. Pt oriented to HBO and questions asked. Pt with PICC in left upper arm. Biopatch removed and dressing reapplied. Assessment completed and patient cleared for treatment. Pt to 2.0 VINCENT at a rate of 1.0 psi. Pt to pressure without any complaints. Pt watching tv. Nurse at chamber side and will continue to monitor.

## 2020-10-28 NOTE — TELEPHONE ENCOUNTER
----- Message from Joy Escobar MD sent at 10/28/2020  7:46 AM EDT -----  Long acting, same as lantus   ----- Message -----  From: Ricki Xiong  Sent: 10/27/2020   1:37 PM EDT  To: Joy Escobar MD    His wife wants infor on the Tesiba samples you gave him, she wants to know if its short acting or long acting? Also how to use it.   [phone]

## 2020-10-28 NOTE — PROGRESS NOTES
RN HYPERBARIC OXYGEN THERAPY RISK ASSESSMENT TOOL   [facility]     Rhea Purvis  MEDICAL RECORD NUMBER:  [id number]  AGE: 39 y.o.    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    Clostridium difficile infection 2016    Diabetic ulcer of right great toe associated with type 2 diabetes mellitus, with necrosis of bone ([facility] 2019    Migraine     Possible perforated tympanic membrane     as a result of recurrent otitis    Recurrent otitis media     Tear of medial meniscus of left knee     Tobacco use     Toe osteomyelitis, left ([facility] 2020       PAST SURGICAL HISTORY  Past Surgical History:   Procedure Laterality Date    KNEE ARTHROSCOPY Left [id number]    LEFT KNEE ARTHROSCOPY , SYNOVECTOMY       OTHER SURGICAL HISTORY Left 2020    PARTIAL LEFT FOOT AMPUTATION    TOE AMPUTATION Right 2019    PARTIAL RIGHT FOOT AMPUTATION performed by Andrews Duff DPM at [facility] TOE AMPUTATION Left 2020    PARTIAL LEFT FOOT AMPUTATION performed by Andrews Duff DPM at [facility] TOE AMPUTATION Left 10/22/2020    PARTIAL LEFT FOOT AMPUTATION WITH GRAFT APPLICATION performed by Andrews Duff DPM at [address] [place] [address]      WISDOM TOOTH EXTRACTION         FAMILY HISTORY  Family History   Problem Relation Age of Onset    Diabetes Mother     High Blood Pressure Mother     High Cholesterol Mother     Diabetes Father     High Blood Pressure Father     High Cholesterol Father     Stroke Father     High Blood Pressure Sister     High Blood Pressure Maternal Uncle     High Cholesterol Maternal Uncle     High Blood Pressure Maternal Grandmother        SOCIAL HISTORY  Social History     Tobacco Use    Smoking status: Former Smoker     Packs/day: 0.50     Years: 2.00 Pack years: 1.00     Last attempt to quit: 8/13/2005     Years since quitting: 15.2    Smokeless tobacco: Former User     Quit date: 8/13/2005    Tobacco comment: SMOKED OCCASIONALLY UNTIL 8 YEARS AGO   Substance Use Topics    Alcohol use: Yes     Comment: RARELY    Drug use: No       ALLERGIES  Allergies   Allergen Reactions    Januvia [Sitagliptin] Nausea Only     Has taken metformin without side effects in the past.  Nausea with Janumet in the past.     Metformin And Related      GI Upset    Vancomycin      Pt had red face, swelling itching of eyelids, sore throat after receiving vancmomyin and cefepime. I think this was a histamine releasing reaction from vancomycin most likely. The cefepime was switched to Zosyn and patient had no reaction to Zosyn    Mustard [employer] Isothiocyanate] Swelling and Rash       MEDICATIONS  Current Outpatient Medications on File Prior to Encounter   Medication Sig Dispense Refill    HUMALOG KWIKPEN 100 UNIT/ML SOPN INJECT 20 UNITS INTO THE SKIN 3 TIMES DAILY (BEFORE MEALS) 5 pen 0    meropenem (MERREM) infusion Infuse 1,000 mg intravenously every 8 hours for 28 days Compound per protocol. 1 each 0    daptomycin (CUBICIN) infusion Infuse 750 mg intravenously every 24 hours for 28 days Compound per protocol. 42297 mg 0    fluticasone (FLONASE) 50 MCG/ACT nasal spray 2 sprays by Each Nostril route daily      HYDROcodone-acetaminophen (NORCO) 5-325 MG per tablet Take 1-2 tablets by mouth every 4 hours as needed for Pain for up to 7 days. 30 tablet 0    gabapentin (NEURONTIN) 400 MG capsule TAKE 2 CAPSULES BY MOUTH THREE TIMES DAILY.  180 capsule 0    traZODone (DESYREL) 50 MG tablet Take 1 tablet by mouth nightly 90 tablet 1    Insulin Glargine, 1 Unit Dial, (TOUJEO SOLOSTAR) 300 UNIT/ML SOPN Inject 75 Units into the skin nightly 5 pen 3    tiZANidine (ZANAFLEX) 4 MG tablet Take 2 tablets by mouth nightly 180 tablet 0    Aspirin-Acetaminophen-Caffeine (EXCEDRIN MIGRAINE PO) Take 2 capsules by mouth as needed       carvedilol (COREG) 12.5 MG tablet Take 1 tablet by mouth 2 times daily (with meals) 180 tablet 1    furosemide (LASIX) 20 MG tablet Take 1 tablet by mouth daily 90 tablet 1    glimepiride (AMARYL) 4 MG tablet Take 1 tablet by mouth every morning (before breakfast) 90 tablet 1    PARoxetine (PAXIL) 10 MG tablet Take 1 tablet by mouth every morning 90 tablet 1    loratadine (CLARITIN) 10 MG tablet Take 10 mg by mouth nightly as needed       simvastatin (ZOCOR) 20 MG tablet TAKE 1 TABLET BY MOUTH NIGHTLY 30 tablet 3    losartan (COZAAR) 50 MG tablet TAKE 1 TABLET BY MOUTH DAILY 30 tablet 3    ammonium lactate (LAC-HYDRIN) 12 % cream Apply topically 2x daily. 1 Tube 6    sildenafil (VIAGRA) 100 MG tablet TAKE 1 TABLET BY MOUTH AS NEEDED FOR ERECTILE DYSFUNCTION 15 tablet 0    insulin lispro (HUMALOG KWIKPEN) 100 UNIT/ML pen Inject 15 Units into the skin 3 times daily (before meals) (Patient taking differently: Inject into the skin 3 times daily (before meals) Patient takes per sliding scale plus he adds 15 units to it) 5 pen 0    HYDROcodone-acetaminophen (NORCO) 5-325 MG per tablet Take 1-2 tablets by mouth every 4 hours as needed for Pain for up to 7 days. 30 tablet 0     No current facility-administered medications on file prior to encounter. LABS  HgBA1c:    Lab Results   Component Value Date    LABA1C 7.8 10/26/2020            Please add comments to any \"yes\" answers.     Do you have a history of: Comments   Seizure no   Congenital spherocytosis no   Optic Neuritis no   Cataracts no   Eye Surgery no   Ear problems Yes right ear does have perforation   Ear reconstructive surgery no   Sinus problems no   Asthma no   Bronchitis no   Emphysema no   Pneumothorax no   Tuberculosis no   Other lung problems no   Hypertension no   Pacemaker/AICD no                                              Congestive heart failure Yes 6 years ago   EF% >30% yes   Any implanted device no                                              Dialysis no   Current pregnancy no   Claustrophobia no   Diabetes yes   Currently using these medications:     a. Disulfiram (Antabuse®) no    b. Mafenide acetate        (Sulfamylon®) no    c.  Diuretics for CHF Yes lasix    d. Amiodarone dose > 400mg/day no    e. Lanoxin/Digoxin no    f. Current Steroid use     no   Cancer:  no    a. Surgery for Cancer no    b. Radiation therapy no    c. Chemotherapy no    If yes, did you receive:     a. Doxorubicin (Adriamycin®) no    b.   Cisplatin (Platinol AQ®) no    c.  Bleomycin (Blenoxane®) no     The above was reviewed with: Dr Mackenzie Wade    Electronically signed by Chon Eng RN on 10/28/2020 at 11:13 AM

## 2020-10-29 ENCOUNTER — HOSPITAL ENCOUNTER (OUTPATIENT)
Dept: WOUND CARE | Age: 45
Discharge: HOME | End: 2020-10-29

## 2020-10-29 ENCOUNTER — HOSPITAL ENCOUNTER (OUTPATIENT)
Dept: HYPERBARIC MEDICINE | Age: 45
Discharge: HOME | End: 2020-10-29
Payer: COMMERCIAL

## 2020-10-29 VITALS
RESPIRATION RATE: 18 BRPM | HEART RATE: 92 BPM | DIASTOLIC BLOOD PRESSURE: 93 MMHG | SYSTOLIC BLOOD PRESSURE: 140 MMHG | TEMPERATURE: 99.7 F

## 2020-10-29 PROBLEM — M86.172 ACUTE OSTEOMYELITIS OF LEFT ANKLE OR FOOT (HCC): Status: RESOLVED | Noted: 2020-10-26 | Resolved: 2020-10-29

## 2020-10-29 LAB
ANAEROBIC CULTURE: ABNORMAL
CULTURE SURGICAL: ABNORMAL
GLUCOSE BLD-MCNC: 239 MG/DL (ref 70–99)
GLUCOSE BLD-MCNC: 265 MG/DL (ref 70–99)
GRAM STAIN RESULT: ABNORMAL
ORGANISM: ABNORMAL
PERFORMED ON: ABNORMAL
PERFORMED ON: ABNORMAL

## 2020-10-29 PROCEDURE — 99183 HYPERBARIC OXYGEN THERAPY: CPT | Performed by: INTERNAL MEDICINE

## 2020-10-29 PROCEDURE — G0277 HBOT, FULL BODY CHAMBER, 30M: HCPCS

## 2020-10-29 ASSESSMENT — PAIN - FUNCTIONAL ASSESSMENT: PAIN_FUNCTIONAL_ASSESSMENT: PREVENTS OR INTERFERES SOME ACTIVE ACTIVITIES AND ADLS

## 2020-10-29 ASSESSMENT — PAIN DESCRIPTION - LOCATION: LOCATION: FOOT

## 2020-10-29 ASSESSMENT — PAIN DESCRIPTION - DESCRIPTORS: DESCRIPTORS: ACHING

## 2020-10-29 ASSESSMENT — PAIN SCALES - GENERAL: PAINLEVEL_OUTOF10: 2

## 2020-10-29 ASSESSMENT — PAIN DESCRIPTION - PROGRESSION: CLINICAL_PROGRESSION: NOT CHANGED

## 2020-10-29 ASSESSMENT — PAIN DESCRIPTION - FREQUENCY: FREQUENCY: CONTINUOUS

## 2020-10-29 ASSESSMENT — PAIN DESCRIPTION - ORIENTATION: ORIENTATION: LEFT

## 2020-10-29 ASSESSMENT — PAIN DESCRIPTION - ONSET: ONSET: ON-GOING

## 2020-10-29 ASSESSMENT — PAIN DESCRIPTION - DIRECTION: RADIATING_TOWARDS: DENIES

## 2020-10-29 ASSESSMENT — PAIN DESCRIPTION - PAIN TYPE: TYPE: CHRONIC PAIN

## 2020-10-29 NOTE — PROGRESS NOTES
Dressing changed post HBO per MD dressing orders. Pt tolerated well. Documented in flowsheet's for reference.

## 2020-10-29 NOTE — PLAN OF CARE
Patient seen for HBOT treatment  2 / 30   today. Patient assessment complete; , pt stated he did not eat breakfast/only had water to drink, no insulin this morning d/t out of medication, pt did take his nightly insulin last night, pt does not exhibit s/s of abnormal blood sugar levels, MD made aware of the above and pt was cleared for HBOT. Patient was compressed in HBO chamber to 2.0 VINCENT at 1.5 psi/min. Patient is tolerating treatment well and is currently resting comfortably and watching television. HBO RN at chamber side, will continue to monitor.

## 2020-10-29 NOTE — OP NOTE
[address]                 [address] [address]                                OPERATIVE REPORT    PATIENT NAME: Laney Dai                   :        1975  MED REC NO:   [id number]                          ROOM:  ACCOUNT NO:   [de-identified]                           ADMIT DATE: 10/22/2020  PROVIDER:     Landy Pacheco DPM    DATE OF PROCEDURE:  10/22/2020    PREOPERATIVE DIAGNOSES:  1. Diabetic foot ulceration, left. 2.  Osteomyelitis, left foot. 3.  Diabetes mellitus. POSTOPERATIVE DIAGNOSES:  1. Diabetic foot ulceration, left. 2.  Osteomyelitis, left foot. 3.  Diabetes mellitus. OPERATION PERFORMED:  Partial left foot amputation. SURGEON:  Landy Pacheco DPM    ANESTHESIA:  Local with MAC. HEMOSTASIS:  Ankle tourniquet to 250 mmHg. ESTIMATED BLOOD LOSS:  Less than 100 mL. REPORT OF OPERATION:  The patient was brought into the operating room  and placed on the operating table in supine position. Under mild IV  sedation, the left first and second rays were anesthetized with 20 mL of  1:1 mixture of 1% lidocaine plain and 0.5% Marcaine plain. Foot was  then scrubbed, prepped, and draped in the usual sterile manner. Esmarch  was then utilized to exsanguinate the left foot. Ankle tourniquet was  then inflated to 250 mmHg. Attention was then directed to the left second digit where an elliptical  incision was then made just distal to the base of the digit. Dissection  was carried back in order to expose the extensor and flexor tendons at  the level of the second metatarsophalangeal joint. The tendon as well  as the joint were then transected of all soft tissue attachment. The  digit was then passed off operative field in total.  There was some  noted to be soft bone at the base of the proximal phalanx.   However, the  bone of the second metatarsal head did appear very white in color as  well as very firm to palpation. There was no necrotic tissue noted in  that surgical site itself. Attention was then directed to the ulceration site on the first ray. At  this time, a scalpel was utilized to ellipse the entire ulceration and  granulation tissue down at the level of the first metatarsal.  There was  noted to be fragments in the region of the head of the first metatarsal.  At this time, the dissection was carried back in order to expose the  midshaft region of the first metatarsal.  This was then transected with  a sagittal saw. Distal portion of this bone as well as all fragments  were then sharply excised from the surgical site. Portion of this was  sent for culture and sensitivity and remained for pathological  evaluation. The remaining necrotic tissue was then excised from the  surgical site itself. It did not appear to tunnel to the second  metatarsal.  The remaining portion of the first metatarsal did appear  relatively healthy at this time. Surgical sites of the first and second  rays were then copiously irrigated with sterile saline via the pulse  lavage. All bleeders were cauterized as necessary. Surgical sites were  then reapproximated with 3-0 nylon in a simple interrupted suture  technique. At this time, we injected a total of 2 mL of AmnioFlo into  the surgical site in order to stimulate wound healing in a high-risk  diabetic patient. Surgical site was then covered with Xeroform gauze,  fluffs, Kerlix, ABD, and Coban. Tourniquet was then deflated. The patient tolerated the procedure and anesthesia well and was  transferred to recovery room with vital signs stable and vascular status  intact. There was evidence for prompt hyperemic response to remaining  digits of the left foot. Following a brief period of postoperative  monitoring, the patient will be transferred home with following written  and verbal instructions:  1. Keep the dressing clean, dry, and intact.   2.  Wear the postop shoe when ambulating. 3.  Contact Dr. Ray Kendallor for postop care or if any problems occur.         YAMILET FENG DPM    D: 10/29/2020 10:13:33       T: 10/29/2020 11:58:39     [email or web address]  Job#: [id number]     Doc#: [id number]    CC:

## 2020-10-30 NOTE — PROGRESS NOTES
[address]  Hyperbaric Oxygen    Ariane Mendoza     : 1975    DATE OF VISIT:  10/29/2020    Subjective     Ariane Mendoza is a 39 y.o. male who  has a past medical history of Acute osteomyelitis of right hallux ([facility] (2019), CHF (congestive heart failure) ([facility] (2014), Clostridium difficile infection (2016), Diabetic ulcer of right great toe associated with type 2 diabetes mellitus, with necrosis of bone ([facility] (2019), Migraine, Possible perforated tympanic membrane, Recurrent otitis media, Tear of medial meniscus of left knee, Tobacco use, and Toe osteomyelitis, left ([facility] (2020). He presents to the [facility] today for hyperbaric oxygen treatment of his Reyna 3 DFU and compromised soft tissue toe amputation flap, the former of which is refractory to standard therapy for 30 days. Patient denies fever. Patient denies nausea, vomiting or diarrhea; no ear troubles and no other new complaints; no fears or anxiety regarding treatment today. Objective     Vitals:    10/29/20 1015 10/29/20 1230   BP: 135/87 (!) 140/93   Pulse: 94 92   Resp: 18 18   Temp: 99.5 °F (37.5 °C) 99.7 °F (37.6 °C)   TempSrc: Oral Oral       General:  Alert, cooperative, no distress. Ears: External otic canals are within acceptable limits. Teed grade 0 on the right and 0 on the left pre-treatment. Teed grade 1 on the right and 1 on the left post-treatment. Lungs:  Clear to auscultation bilaterally. Ulcer: Not examined today in HBO, if applicable. Recent Labs     10/28/20  1150 10/28/20  1416 10/29/20  1019 10/29/20  1227   POCGLU 347* 272* 265* 239*       Assessment     Ariane Mendoza is a 39 y.o. male who presented to the [facility] today for hyperbaric oxygen treatment #2 of 30 for the diagnosis as stated above. Treatment given at 2 VINCENT for 90 minutes, with no air breaks.  Total Treatment Time (min): 108 today, including compression, 100% oxygen at pressure, air breaks if applicable, and decompression. Patient Active Problem List   Diagnosis Code    Hypertension I10    Uncontrolled type 2 diabetes mellitus with diabetic polyneuropathy ([facility]) E11.42, E11.65    Cardiomyopathy ([facility] I42.9    Mixed hyperlipidemia E78.2    Diabetic ulcer of left foot associated with type 2 diabetes mellitus, with necrosis of bone ([facility]) E11.621, L97.524    Cellulitis of left foot L03. 116    Allergic rhinitis J30.9    Osteoarthritis M19.90    Acute osteomyelitis of metatarsal bone of left foot ([facility]) M86.172    Surgical wound dehiscence, initial encounter T81.31XA    Compromised soft 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 toe amputation site T86.821    Chronic osteomyelitis of left foot ([facility] K81.289       In my clinical judgement, ongoing HBO therapy is necessary at this time, given a threat to patient function, limb or life from the current condition. Adjunctive Rx, objective weekly progress and goals of Rx will periodically be updated, on Mondays. Plan     1. Hyperbaric Oxygen - Shayy Osei tolerated the treatment well today without complications. Continue HBO treatment as outlined above. 2. Other - he has been out of short-acting insulin for a couple of days, has been really trying to limit his carbs even more than usual during this time; does still have his long-acting insulin, expects to  the short-acting Rx at the pharmacy today, and I reminded him that the foot infection will definitely drive his glucoses up as well. I AM very impressed with the drop in his A1c in the last few months. I was present on these premises and immediately available to furnish assistance & direction throughout the procedure.      -- Electronically signed by Vilma Linda MD on 10/29/2020 at 8:45 PM

## 2020-11-02 ENCOUNTER — HOSPITAL ENCOUNTER (OUTPATIENT)
Age: 45
Setting detail: SPECIMEN
Discharge: HOME | End: 2020-11-02
Payer: COMMERCIAL

## 2020-11-02 ENCOUNTER — HOSPITAL ENCOUNTER (OUTPATIENT)
Dept: HYPERBARIC MEDICINE | Age: 45
Discharge: HOME | End: 2020-11-02
Payer: COMMERCIAL

## 2020-11-02 ENCOUNTER — HOSPITAL ENCOUNTER (OUTPATIENT)
Dept: WOUND CARE | Age: 45
Discharge: HOME | End: 2020-11-02
Payer: COMMERCIAL

## 2020-11-02 VITALS
RESPIRATION RATE: 16 BRPM | HEART RATE: 98 BPM | SYSTOLIC BLOOD PRESSURE: 155 MMHG | DIASTOLIC BLOOD PRESSURE: 82 MMHG | TEMPERATURE: 99.6 F

## 2020-11-02 LAB
A/G RATIO: 1.6 (ref 1.1–2.2)
ALBUMIN SERPL-MCNC: 4.1 G/DL (ref 3.4–5)
ALP BLD-CCNC: 185 U/L (ref 40–129)
ALT SERPL-CCNC: 31 U/L (ref 10–40)
ANION GAP SERPL CALCULATED.3IONS-SCNC: 12 MMOL/L (ref 3–16)
AST SERPL-CCNC: 34 U/L (ref 15–37)
BASOPHILS ABSOLUTE: 0.1 K/UL (ref 0–0.2)
BASOPHILS RELATIVE PERCENT: 0.7 %
BILIRUB SERPL-MCNC: 1 MG/DL (ref 0–1)
BUN BLDV-MCNC: 13 MG/DL (ref 7–20)
CALCIUM SERPL-MCNC: 9.2 MG/DL (ref 8.3–10.6)
CHLORIDE BLD-SCNC: 99 MMOL/L (ref 99–110)
CO2: 27 MMOL/L (ref 21–32)
CREAT SERPL-MCNC: <0.5 MG/DL (ref 0.9–1.3)
EOSINOPHILS ABSOLUTE: 0.3 K/UL (ref 0–0.6)
EOSINOPHILS RELATIVE PERCENT: 2 %
GFR AFRICAN AMERICAN: >60
GFR NON-AFRICAN AMERICAN: >60
GLOBULIN: 2.6 G/DL
GLUCOSE BLD-MCNC: 147 MG/DL (ref 70–99)
GLUCOSE BLD-MCNC: 204 MG/DL (ref 70–99)
GLUCOSE BLD-MCNC: 231 MG/DL (ref 70–99)
HCT VFR BLD CALC: 38.1 % (ref 40.5–52.5)
HEMOGLOBIN: 13 G/DL (ref 13.5–17.5)
LYMPHOCYTES ABSOLUTE: 3.2 K/UL (ref 1–5.1)
LYMPHOCYTES RELATIVE PERCENT: 22.1 %
MCH RBC QN AUTO: 30.7 PG (ref 26–34)
MCHC RBC AUTO-ENTMCNC: 34.1 G/DL (ref 31–36)
MCV RBC AUTO: 90 FL (ref 80–100)
MONOCYTES ABSOLUTE: 1 K/UL (ref 0–1.3)
MONOCYTES RELATIVE PERCENT: 7 %
NEUTROPHILS ABSOLUTE: 10 K/UL (ref 1.7–7.7)
NEUTROPHILS RELATIVE PERCENT: 68.2 %
PDW BLD-RTO: 13.1 % (ref 12.4–15.4)
PERFORMED ON: ABNORMAL
PERFORMED ON: ABNORMAL
PLATELET # BLD: 392 K/UL (ref 135–450)
PMV BLD AUTO: 7 FL (ref 5–10.5)
POTASSIUM SERPL-SCNC: 5.5 MMOL/L (ref 3.5–5.1)
RBC # BLD: 4.23 M/UL (ref 4.2–5.9)
SEDIMENTATION RATE, ERYTHROCYTE: 79 MM/HR (ref 0–15)
SODIUM BLD-SCNC: 138 MMOL/L (ref 136–145)
TOTAL CK: 60 U/L (ref 39–308)
TOTAL PROTEIN: 6.7 G/DL (ref 6.4–8.2)
WBC # BLD: 14.7 K/UL (ref 4–11)

## 2020-11-02 PROCEDURE — 86140 C-REACTIVE PROTEIN: CPT

## 2020-11-02 PROCEDURE — 85025 COMPLETE CBC W/AUTO DIFF WBC: CPT

## 2020-11-02 PROCEDURE — 82550 ASSAY OF CK (CPK): CPT

## 2020-11-02 PROCEDURE — 99214 OFFICE O/P EST MOD 30 MIN: CPT | Performed by: INTERNAL MEDICINE

## 2020-11-02 PROCEDURE — 99183 HYPERBARIC OXYGEN THERAPY: CPT | Performed by: INTERNAL MEDICINE

## 2020-11-02 PROCEDURE — 11042 DBRDMT SUBQ TIS 1ST 20SQCM/<: CPT

## 2020-11-02 PROCEDURE — 80053 COMPREHEN METABOLIC PANEL: CPT

## 2020-11-02 PROCEDURE — 99212 OFFICE O/P EST SF 10 MIN: CPT

## 2020-11-02 PROCEDURE — G0277 HBOT, FULL BODY CHAMBER, 30M: HCPCS

## 2020-11-02 PROCEDURE — 85652 RBC SED RATE AUTOMATED: CPT

## 2020-11-02 PROCEDURE — 36415 COLL VENOUS BLD VENIPUNCTURE: CPT

## 2020-11-02 RX ORDER — LIDOCAINE 40 MG/G
CREAM TOPICAL ONCE
Status: DISCONTINUED | OUTPATIENT
Start: 2020-11-02 | End: 2020-11-03 | Stop reason: HOSPADM

## 2020-11-02 ASSESSMENT — PAIN SCALES - GENERAL
PAINLEVEL_OUTOF10: 0
PAINLEVEL_OUTOF10: 0

## 2020-11-02 NOTE — PROGRESS NOTES
[address] [facility] Progress Note      Cheryl Sanchez     : 1975    DATE OF VISIT:  2020    Subjective:     Cheryl Sanchez is a 39 y.o. male who has a chief complaint of a diabetic ulcer located on the left foot. Doing well since surgery. Has been staying off his foot. No issues with HBO or antibiotics. Mr. Layo Grant has a past medical history of Acute osteomyelitis of right hallux ([facility] CHF (congestive heart failure) ([address] Clostridium difficile infection, Diabetic ulcer of right great toe associated with type 2 diabetes mellitus, with necrosis of bone ([address] Migraine, Possible perforated tympanic membrane, Recurrent otitis media, Tear of medial meniscus of left knee, Tobacco use, and Toe osteomyelitis, left ([address] He has a past surgical history that includes Tonsillectomy; [place] tooth extraction; Knee arthroscopy (Left, [id number]); Toe amputation (Right, 2019); other surgical history (Left, 2020); Toe amputation (Left, 2020); and Toe amputation (Left, 10/22/2020). His family history includes Diabetes in his father and mother; High Blood Pressure in his father, maternal grandmother, maternal uncle, mother, and sister; High Cholesterol in his father, maternal uncle, and mother; Stroke in his father. Mr. Layo Grant reports that he quit smoking about 15 years ago. He has a 1.00 pack-year smoking history. He quit smokeless tobacco use about 15 years ago. He reports current alcohol use. He reports that he does not use drugs. His current medication list consists of Aspirin-Acetaminophen-Caffeine, HYDROcodone-acetaminophen, Insulin Glargine (1 Unit Dial), PARoxetine, ammonium lactate, carvedilol, daptomycin, fluticasone, furosemide, gabapentin, glimepiride, insulin lispro, loratadine, losartan, meropenem, oxymetazoline, sildenafil, simvastatin, tiZANidine, and traZODone. Allergies: Januvia [sitagliptin];  Metformin and related; Vancomycin; and Mustard oil [allyl isothiocyanate]    Pertinent items from the review of systems are discussed in the HPI; the remainder of the ROS was reviewed and is negative. Objective: There were no vitals taken for this visit. General Appearance: alert and oriented to person, place and time, well developed and well- nourished, in no acute distress  Skin: warm and dry, no rash or erythema  Head: normocephalic and atraumatic  Eyes: pupils equal, round, and reactive to light, extraocular eye movements intact, conjunctivae normal  ENT: tympanic membrane, external ear and ear canal normal bilaterally, nose without deformity, nasal mucosa and turbinates normal without polyps  Neck: supple and non-tender without mass, no thyromegaly or thyroid nodules, no cervical lymphadenopathy  Pulmonary/Chest: clear to auscultation bilaterally- no wheezes, rales or rhonchi, normal air movement, no respiratory distress  Cardiovascular: normal rate, regular rhythm, normal S1 and S2, no murmurs, rubs, clicks, or gallops, distal pulses intact, no carotid bruits  Abdomen: soft, non-tender, non-distended, normal bowel sounds, no masses or organomegaly.      Dorsalis pedis pulse left palpable  Posterior tibial pulse left palpable  Dorsalis pedis pulse right palpable  Posterior tibial pulse right palpable    Ulcer on the left 2nd digit amputation site with mild fibrotic tissue, red granulation tissue, mild serous drainage, no hyperkeratotic rim, no undermining, no tunneling, no purulence, no malodor, no eschar, no periwound maceration, mild periwound erythema, mild edema, no crepitus, no increase in skin temperature, ulcer probes to soft tissue only     Ulcer on the left hallux amputation site with minimal fibrotic tissue, red granulation tissue, mild serous drainage, no hyperkeratotic rim, no undermining, no tunneling, no purulence, no malodor, no eschar, no periwound maceration, moderate periwound erythema, mild edema, no crepitus, no increase in skin temperature, ulcer probes to bone of the 1st metatarsal    Sutures intact proximal aspect 1st ray and 2nd digit amputation site. Today's ulcer measurements are in the wound documentation flowsheet.      Wound measurements:  Wound 07/31/20 Proximal #1, Left Great toe amp site, DFU, Reyna 4, onset 7/24/20-Wound Length (cm): 5.4 cm  Wound 11/02/20 #6, left second toe, diabetic 2,  full thickness, onset 11/2/2020-Wound Length (cm): 1.5 cm    Wound 07/31/20 Proximal #1, Left Great toe amp site, DFU, Reyna 4, onset 7/24/20-Wound Width (cm): 1 cm  Wound 11/02/20 #6, left second toe, diabetic 2,  full thickness, onset 11/2/2020-Wound Width (cm): 2.2 cm    Wound 07/31/20 Proximal #1, Left Great toe amp site, DFU, Reyna 4, onset 7/24/20-Wound Depth (cm): 2.5 cm  Wound 11/02/20 #6, left second toe, diabetic 2,  full thickness, onset 11/2/2020-Wound Depth (cm): 0.2 cm    LABS  Lab Results   Component Value Date    LABA1C 7.8 10/26/2020     Xray left foot - postop changes    Culture -   Pseudomonas aeruginosa (1)     Antibiotic  Interpretation  SIMONE  Status     cefepime  Resistant  >16  mcg/mL      ciprofloxacin  Intermediate  2  mcg/mL      gentamicin  Sensitive  <=4  mcg/mL      meropenem  Sensitive  <=1  mcg/mL      piperacillin-tazobactam  Sensitive  <=16  mcg/mL      tobramycin  Sensitive  <=4  mcg/mL      Enterobacter cloacae complex (2)     Antibiotic  Interpretation  SIMONE  Status     amoxicillin-clavulanate  Resistant  >16/8  mcg/mL      ampicillin  Resistant  >16  mcg/mL      ceFAZolin  Resistant  >16  mcg/mL      cefepime  Sensitive  <=2  mcg/mL      cefTRIAXone  Sensitive  <=1  mcg/mL      cefuroxime  Sensitive  8  mcg/mL      ciprofloxacin  Sensitive  <=1  mcg/mL      ertapenem  Sensitive  <=0.5  mcg/mL      gentamicin  Sensitive  <=4  mcg/mL      meropenem  Sensitive  <=1  mcg/mL      piperacillin-tazobactam  Sensitive  <=16  mcg/mL      trimethoprim-sulfamethoxazole  Sensitive  <=2/38  mcg/mL Alcaligenes faecalis (3)     Antibiotic  Interpretation  SIMONE  Status     cefepime  Sensitive  4  mcg/mL      cefTRIAXone  Sensitive  <=1  mcg/mL      ciprofloxacin  Sensitive  <=1  mcg/mL      gentamicin  Resistant  <=4  mcg/mL      meropenem  Sensitive  <=1  mcg/mL      piperacillin-tazobactam  Sensitive  <=16  mcg/mL      tobramycin  Sensitive  <=4  mcg/mL      trimethoprim-sulfamethoxazole  Sensitive  <=2/38  mcg/mL          Path - chronic and acute osteomyelitis      Assessment:     Patient Active Problem List   Diagnosis Code    Hypertension I10    Uncontrolled type 2 diabetes mellitus with diabetic polyneuropathy ([facility]) E11.42, E11.65    Cardiomyopathy ([facility] I42.9    Mixed hyperlipidemia E78.2    Diabetic ulcer of left forefoot associated with type 2 diabetes mellitus, with necrosis of bone ([facility]) E11.621, L97.524    Cellulitis of left foot L03. 116    Allergic rhinitis J30.9    Osteoarthritis M19.90    Acute osteomyelitis of metatarsal bone of left foot ([facility]) M86.172    Surgical wound dehiscence, initial encounter T81.31XA    Compromised soft 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 toe amputation site T86.821    Chronic osteomyelitis of left foot ([facility]) G07.788       Assessment of today's active condition(s): cellulitis left foot - improving, osteomyelitis left foot, diabetic foot ulcer left hallux amputation, diabetes mellitus. Factors contributing to occurrence and/or persistence of the chronic ulcer include diabetes and poor glucose control. Sharp debridement is not indicated today, based upon the exam findings in the ulcer(s) above.       Discharge plan:     Treatment in the wound care center today: Wound 07/31/20 Proximal #1, Left Great toe amp site, DFU, Reyna 4, onset 7/24/20-Dressing/Treatment: Other (comment)(betadine-patricia,iodoform packing,4x4,ABD,kerlix,coban)  Wound 11/02/20 #6, left second toe, diabetic 2,  full thickness, onset 11/2/2020-Dressing/Treatment: Other (comment)(betadine-patricia,4x4,ABD,kerlix,coban). Home treatment: good handwashing before and after any dressing changes. Cleanse ulcer with saline or soap & water before dressing change. May use Vaseline (petrolatum), Aquaphor, Aveeno, CeraVe, Cetaphil, Eucerin, Lubriderm, etc for dry skin. Dressing type for home: Iodoform and dry dressing applied to be changed on Thursday. Written discharge instructions given to patient. Offload ulcer(s) as directed. Elevate leg(s) as directed. Follow up in 1 week. Needs to control glucose levels. HBO and antibiotics as per Dr. Carleen Carbajal. Discussed in room with Dr. Carleen Carbajal.        Electronically signed by Payal Schneider DPM on 11/2/2020 at 3:42 PM.

## 2020-11-02 NOTE — PLAN OF CARE
Pt to the [facility] for follow up appointment. Sutures intact. Pt to continue with HBOT, weekly labs, biweekly dressing changes, and IV antibiotics. Pt to follow up in the [facility] in 1 week with Dr Ilana Becerra. Pt to have dressing changed on Thursday. Discharge instructions reviewed with patient, all questions answered, copy given to patient. Dressings were applied to all wounds per M.D. Instructions at this visit.

## 2020-11-03 LAB — C-REACTIVE PROTEIN: 32.4 MG/L (ref 0–5.1)

## 2020-11-03 NOTE — PROGRESS NOTES
[address]  Hyperbaric Oxygen    Hargis Gaucher     : 1975    DATE OF VISIT:  2020    Subjective     Hargis Gaucher is a 39 y.o. male who  has a past medical history of Acute osteomyelitis of right hallux ([facility] (2019), CHF (congestive heart failure) ([facility] (2014), Clostridium difficile infection (2016), Diabetic ulcer of right great toe associated with type 2 diabetes mellitus, with necrosis of bone ([facility] (2019), Migraine, Possible perforated tympanic membrane, Recurrent otitis media, Tear of medial meniscus of left knee, Tobacco use, and Toe osteomyelitis, left ([facility] (2020). He presents to the [facility] today for hyperbaric oxygen treatment of his Reyna 3 DFU and compromised soft tissue flap (at the 2nd toe amp site), the former of which is refractory to standard therapy for 30 days. Patient denies fever. Patient denies nausea, vomiting or diarrhea; no ear troubles and no other new complaints; no fears or anxiety regarding treatment today. A little worryingly, he tells me that he doesn't really make an effort to clear his ears during compression, that they just \"pop and crack\" on their own; normally in my experience that means that barotrauma is occurring, but his TMs HAVE looked quite stable, and any minor sinus and ear congestion and discomfort he sometimes has at baseline actually feels better in the chamber to him. Also says that his neuropathic lower extremity pain feels better when in the chamber, and I'm not sure how to explain that. Objective     Vitals:    20 1102 20 1302   BP: (!) 140/98 (!) 155/82   Pulse: 110 98   Resp: 16 16   Temp: 97.8 °F (36.6 °C) 99.6 °F (37.6 °C)   TempSrc: Oral Oral       General:  Alert, cooperative, no distress. Ears: External otic canals are within acceptable limits. Teed grade 0 on the right and 0 on the left pre-treatment.    Teed grade 0 on the right and 0 on the left post-treatment. Lungs:  Clear to auscultation bilaterally. Ulcer: Not examined today in HBO, if applicable. Recent Labs     11/02/20  1101 11/02/20  1304   POCGLU 231* 147*       Assessment     Brayan Dale is a 39 y.o. male who presented to the [facility] today for hyperbaric oxygen treatment #4 of 30 for the diagnosis as stated above. Treatment given at 2 VINCENT for 90 minutes, with no air breaks. Total Treatment Time (min): 109 today, including compression, 100% oxygen at pressure, air breaks if applicable, and decompression. Patient Active Problem List   Diagnosis Code    Hypertension I10    Uncontrolled type 2 diabetes mellitus with diabetic polyneuropathy ([facility]) E11.42, E11.65    Cardiomyopathy ([facility] I42.9    Mixed hyperlipidemia E78.2    Diabetic ulcer of left forefoot associated with type 2 diabetes mellitus, with necrosis of bone ([facility]) E11.621, L97.524    Cellulitis of left foot L03. 116    Allergic rhinitis J30.9    Osteoarthritis M19.90    Acute osteomyelitis of metatarsal bone of left foot ([facility]) M86.172    Surgical wound dehiscence, initial encounter T81.31XA    Compromised soft 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 toe amputation site T86.821    Chronic osteomyelitis of left foot ([facility] R57.380       In my clinical judgement, ongoing HBO therapy is necessary at this time, given a threat to patient function, limb or life from the current condition. Adjunctive Rx, objective weekly progress and goals of Rx will periodically be updated, on Mondays. This being just his 4th dive, I'll reserve a significant update regarding the Reyna 3 ulcer for another week or two. In terms of the amputation flap, it does look just a bit better than last week -- less edema, less drainage, a bit more pink. Plan     1. Hyperbaric Oxygen - Adelaubin Mathew Osei tolerated the treatment well today without complications. Continue HBO treatment as outlined above.   2. Other - see ID and wound care notes from today for more information re: local care, Abx, etc.    I was present on these premises and immediately available to furnish assistance & direction throughout the procedure.      -- Electronically signed by Cecilia Donis MD on 11/2/2020 at 7:09 PM

## 2020-11-03 NOTE — PROGRESS NOTES
[address] [facility] (ID) Progress Note    Laquita Oneill     : 1975    DATE OF VISIT:  2020    Subjective:     Laquita Oneill is a 39 y.o. male who has a diabetic,  dehisced surgical and  compromised graft / flap ulcer located on the foot (left , forefoot). Current complaint of pain in this ulcer? yes. Quality of pain: aching, burning and sharp  Timing: intermittent and stable  Severity: mild-moderate  Associated Signs/Symptoms: numbness, tingling, less swelling, less redness, moderate serosanguinous drainage  Other significant symptoms or pertinent ulcer history: tolerating HBO well so far, no claustrophobia, no cardiopulmonary symptoms, no symptomatic barotrauma. No visual changes yet (which he's very concerned about), but I reviewed that it's too early to worry about that just yet. Doing well with IV Abx as well; no F/C/D, PICC discomfort, N/V/D, drug rash or pruritus. Staying off his foot as much as he can; trying to eat well, and glucoses improved from last week (when the IV Abx had just started, and he was briefly out of short-acting insulin). Additional ulcer(s) noted? no.      Mr. Lloyd Lara has a past medical history of Acute osteomyelitis of right hallux ([address] CHF (congestive heart failure) ([address] Clostridium difficile infection, Diabetic ulcer of right great toe associated with type 2 diabetes mellitus, with necrosis of bone ([address] Migraine, Possible perforated tympanic membrane, Recurrent otitis media, Tear of medial meniscus of left knee, Tobacco use, and Toe osteomyelitis, left ([address] He has a past surgical history that includes Tonsillectomy; [place] tooth extraction; Knee arthroscopy (Left, [id number]); Toe amputation (Right, 2019); other surgical history (Left, 2020); Toe amputation (Left, 2020); and Toe amputation (Left, 10/22/2020).     His family history includes Diabetes in his father and mother; High Blood Pressure in his father, maternal grandmother, maternal uncle, mother, and sister; High Cholesterol in his father, maternal uncle, and mother; Stroke in his father. Mr. Criselda Ahumada reports that he quit smoking about 15 years ago. He has a 1.00 pack-year smoking history. He quit smokeless tobacco use about 15 years ago. He reports current alcohol use. He reports that he does not use drugs. His current medication list consists of Aspirin-Acetaminophen-Caffeine, HYDROcodone-acetaminophen, Insulin Glargine (1 Unit Dial), PARoxetine, ammonium lactate, carvedilol, daptomycin, fluticasone, furosemide, gabapentin, glimepiride, insulin lispro, loratadine, losartan, meropenem, oxymetazoline, sildenafil, simvastatin, tiZANidine, and traZODone. He's had just under 1 week of IV ABx at this point. Allergies: Januvia [sitagliptin]; Metformin and related; Vancomycin; and Mustard oil [allyl isothiocyanate]    Pertinent items from the review of systems are discussed in the HPI; the remainder of the ROS was reviewed and is negative. Objective:     T 99.6 after HBO, HR 98, RR 16, /82. Constitutional:  well-developed, well-nourished, overweight, NAD  Psychiatric:  oriented to person, place and time; mood and affect appropriate for the situation   Eyes:  pupils equal, round and reactive to light; sclerae anicteric, conjunctivae not pale  ENT: no thrush or oral ulcers, mucous membranes moist  Abd: soft, NT, ND, good BS  Cardiovascular:  bilateral pedal pulses palpable, feet warm, good cap refill; mild BL lower extremity edema; PICC site benign, no palpable cord or tenderness  Lymphatic:  no inguinal or popliteal adenopathy, no angitis, fading and receding left foot cellulitis  Musculoskeletal:  no clubbing, cyanosis or petechiae; RLE and LLE with no gross effusions, joint misalignment or acute arthritis  Shanita-ulcer skin: indurated and degrees of pink to light red, mild maceration and hyperkeratosis.   Ulcer(s): 2nd toe amp site with a bit less ambulation; just a surgical shoe right now for offloading, since the wounds are distal and dorsal, not plantar. Keep focused on glucose control and protein intake. Continue current Abx. Tentative plan 4-6 weeks of therapy, depending on how he does. Will watch closely for allergic manifestations, Candidiasis, Cdiff, PICC complications, etc; weekly labs ordered, weekly PICC dressings being done. Continue HBO of course. Written discharge instructions given to patient. Follow up in 1 week with Dr. Alan Drake and me, but daily for HBOT.     Electronically signed by Oz Keith MD on 11/2/2020 at 7:14 PM.

## 2020-11-04 ENCOUNTER — HOSPITAL ENCOUNTER (OUTPATIENT)
Dept: HYPERBARIC MEDICINE | Age: 45
Discharge: HOME | End: 2020-11-04
Payer: COMMERCIAL

## 2020-11-04 VITALS
DIASTOLIC BLOOD PRESSURE: 96 MMHG | SYSTOLIC BLOOD PRESSURE: 140 MMHG | RESPIRATION RATE: 18 BRPM | HEART RATE: 100 BPM | TEMPERATURE: 99.3 F

## 2020-11-04 LAB
GLUCOSE BLD-MCNC: 160 MG/DL (ref 70–99)
GLUCOSE BLD-MCNC: 193 MG/DL (ref 70–99)
PERFORMED ON: ABNORMAL
PERFORMED ON: ABNORMAL

## 2020-11-04 PROCEDURE — 99183 HYPERBARIC OXYGEN THERAPY: CPT | Performed by: EMERGENCY MEDICINE

## 2020-11-04 PROCEDURE — G0277 HBOT, FULL BODY CHAMBER, 30M: HCPCS

## 2020-11-04 ASSESSMENT — PAIN DESCRIPTION - ONSET
ONSET: ON-GOING
ONSET: ON-GOING

## 2020-11-04 ASSESSMENT — PAIN DESCRIPTION - ORIENTATION
ORIENTATION: LEFT
ORIENTATION: LEFT

## 2020-11-04 ASSESSMENT — PAIN DESCRIPTION - FREQUENCY
FREQUENCY: CONTINUOUS
FREQUENCY: CONTINUOUS

## 2020-11-04 ASSESSMENT — PAIN SCALES - GENERAL
PAINLEVEL_OUTOF10: 6
PAINLEVEL_OUTOF10: 5

## 2020-11-04 ASSESSMENT — PAIN DESCRIPTION - DIRECTION
RADIATING_TOWARDS: FOOT/ANKLE
RADIATING_TOWARDS: UP LEG

## 2020-11-04 ASSESSMENT — PAIN DESCRIPTION - PAIN TYPE
TYPE: CHRONIC PAIN
TYPE: CHRONIC PAIN

## 2020-11-04 ASSESSMENT — PAIN DESCRIPTION - DESCRIPTORS
DESCRIPTORS: CONSTANT
DESCRIPTORS: THROBBING

## 2020-11-04 ASSESSMENT — PAIN DESCRIPTION - LOCATION
LOCATION: FOOT
LOCATION: FOOT

## 2020-11-04 ASSESSMENT — PAIN DESCRIPTION - PROGRESSION
CLINICAL_PROGRESSION: GRADUALLY WORSENING
CLINICAL_PROGRESSION: NOT CHANGED

## 2020-11-04 NOTE — PLAN OF CARE
Patient seen for HBOT treatment  5 / 30    today. Patient assessment complete and cleared for HBOT. Patient was compressed in HBO chamber to 2.0 VINCENT at 1.5 psi/min. Patient is tolerating treatment well and is currently resting comfortably and watching television. HBO RN at chamber side, will continue to monitor.

## 2020-11-05 ENCOUNTER — HOSPITAL ENCOUNTER (OUTPATIENT)
Dept: HYPERBARIC MEDICINE | Age: 45
Discharge: HOME | End: 2020-11-05
Payer: COMMERCIAL

## 2020-11-05 VITALS
TEMPERATURE: 99.1 F | SYSTOLIC BLOOD PRESSURE: 148 MMHG | HEART RATE: 91 BPM | RESPIRATION RATE: 18 BRPM | DIASTOLIC BLOOD PRESSURE: 95 MMHG

## 2020-11-05 LAB
GLUCOSE BLD-MCNC: 146 MG/DL (ref 70–99)
GLUCOSE BLD-MCNC: 214 MG/DL (ref 70–99)
PERFORMED ON: ABNORMAL
PERFORMED ON: ABNORMAL

## 2020-11-05 PROCEDURE — G0277 HBOT, FULL BODY CHAMBER, 30M: HCPCS

## 2020-11-05 ASSESSMENT — PAIN DESCRIPTION - ORIENTATION
ORIENTATION: LEFT
ORIENTATION: LEFT

## 2020-11-05 ASSESSMENT — PAIN DESCRIPTION - PROGRESSION
CLINICAL_PROGRESSION: NOT CHANGED
CLINICAL_PROGRESSION: NOT CHANGED

## 2020-11-05 ASSESSMENT — PAIN SCALES - GENERAL
PAINLEVEL_OUTOF10: 1
PAINLEVEL_OUTOF10: 4

## 2020-11-05 ASSESSMENT — PAIN DESCRIPTION - LOCATION
LOCATION: FOOT
LOCATION: FOOT

## 2020-11-05 ASSESSMENT — PAIN DESCRIPTION - FREQUENCY
FREQUENCY: CONTINUOUS
FREQUENCY: CONTINUOUS

## 2020-11-05 ASSESSMENT — PAIN DESCRIPTION - DIRECTION
RADIATING_TOWARDS: FOOT
RADIATING_TOWARDS: FOOT

## 2020-11-05 ASSESSMENT — PAIN DESCRIPTION - ONSET
ONSET: ON-GOING
ONSET: ON-GOING

## 2020-11-05 ASSESSMENT — PAIN DESCRIPTION - DESCRIPTORS
DESCRIPTORS: ACHING;CONSTANT
DESCRIPTORS: ACHING

## 2020-11-05 ASSESSMENT — PAIN DESCRIPTION - PAIN TYPE
TYPE: CHRONIC PAIN
TYPE: CHRONIC PAIN

## 2020-11-05 NOTE — PLAN OF CARE
Patient seen for HBOT treatment 6 / 30    today. Patient assessment complete and cleared for HBOT. Patient was compressed in HBO chamber to 2.0 VINCENT at 1.5 psi/min. Patient is tolerating treatment well and is currently resting comfortably and watching television. HBO RN at chamber side, will continue to monitor.

## 2020-11-05 NOTE — PROGRESS NOTES
[facility]  Hyperbaric Oxygen Therapy   Progress Note      NAME: Alan Kown   MEDICAL RECORD NUMBER:  [id number]  AGE: 39 y.o. GENDER: male  : 1975  EPISODE DATE:  2020     Subjective     HBO Treatment Number: 6 out of Total Treatments: 30    HBO Diagnosis:     Indications: Lower Extremity Diabetic Wound ___(site)(Left foot)  Sebastian Rosario Duet 3     Pertinent past medical history of Acute osteomyelitis of right hallux ([facility] (2019), CHF (congestive heart failure) ([facility] (2014), Clostridium difficile infection (2016), Diabetic ulcer of right great toe associated with type 2 diabetes mellitus, with necrosis of bone ([facility] (2019), Migraine, Possible perforated tympanic membrane, Recurrent otitis media, Tear of medial meniscus of left knee, Tobacco use, and Toe osteomyelitis, left ([facility] (2020). He presents to the [facility] today for hyperbaric oxygen treatment of his diabetic foot ulcer and compromised skin flap/graft , which is refractory to standard therapy for 30 days. Patient denies fever. Patient denies nausea, vomiting or diarrhea; no ear troubles and no other new complaints; no fears or anxiety regarding treatment today. Safety checks performed prior to treatment. See doc flowsheets for documentation. Objective           Recent Labs     20  1255 20  1100   POCGLU 160* 214*           Glucose Testing  Blood Glucose POCT: (!) 214     Pre treatment Vital Signs       Temp: 97.7 °F (36.5 °C)     Pulse: 111     Resp: 18   BP: (!) 145/92       Post treatment Vital Signs  Temp: 97.7 °F (36.5 °C)  Pulse: 111  Resp: 18  BP: (!) 145/92      Assessment        Alan Kwon is a 39 y.o. male who presented to the [facility] today for hyperbaric oxygen treatment #6 of 30 for the diagnosis as stated above. Treatment given at 2 VINCENT for 90 minutes, with no air breaks.    today, including

## 2020-11-06 ENCOUNTER — HOSPITAL ENCOUNTER (OUTPATIENT)
Dept: WOUND CARE | Age: 45
Discharge: HOME | End: 2020-11-06
Payer: COMMERCIAL

## 2020-11-06 VITALS
DIASTOLIC BLOOD PRESSURE: 100 MMHG | RESPIRATION RATE: 18 BRPM | SYSTOLIC BLOOD PRESSURE: 160 MMHG | TEMPERATURE: 97.8 F | HEART RATE: 94 BPM

## 2020-11-06 PROCEDURE — 11042 DBRDMT SUBQ TIS 1ST 20SQCM/<: CPT

## 2020-11-06 PROCEDURE — 11042 DBRDMT SUBQ TIS 1ST 20SQCM/<: CPT | Performed by: INTERNAL MEDICINE

## 2020-11-06 RX ORDER — LIDOCAINE 40 MG/G
CREAM TOPICAL ONCE
Status: DISCONTINUED | OUTPATIENT
Start: 2020-11-06 | End: 2020-11-07 | Stop reason: HOSPADM

## 2020-11-06 RX ORDER — OXYCODONE HYDROCHLORIDE AND ACETAMINOPHEN 5; 325 MG/1; MG/1
1 TABLET ORAL EVERY 6 HOURS PRN
Qty: 28 TABLET | Refills: 0 | Status: SHIPPED | OUTPATIENT
Start: 2020-11-06 | End: 2020-11-19 | Stop reason: ALTCHOICE

## 2020-11-06 ASSESSMENT — PAIN DESCRIPTION - ORIENTATION: ORIENTATION: LEFT

## 2020-11-06 ASSESSMENT — PAIN SCALES - GENERAL: PAINLEVEL_OUTOF10: 8

## 2020-11-06 ASSESSMENT — PAIN DESCRIPTION - DESCRIPTORS: DESCRIPTORS: CONSTANT;PRESSURE

## 2020-11-06 ASSESSMENT — PAIN DESCRIPTION - PAIN TYPE: TYPE: CHRONIC PAIN

## 2020-11-09 ENCOUNTER — HOSPITAL ENCOUNTER (OUTPATIENT)
Dept: HYPERBARIC MEDICINE | Age: 45
Discharge: HOME | End: 2020-11-09
Payer: COMMERCIAL

## 2020-11-09 ENCOUNTER — HOSPITAL ENCOUNTER (OUTPATIENT)
Dept: WOUND CARE | Age: 45
Discharge: HOME | End: 2020-11-09
Payer: COMMERCIAL

## 2020-11-09 VITALS
TEMPERATURE: 99.1 F | HEIGHT: 72 IN | RESPIRATION RATE: 16 BRPM | HEART RATE: 96 BPM | BODY MASS INDEX: 35.62 KG/M2 | SYSTOLIC BLOOD PRESSURE: 140 MMHG | WEIGHT: 263 LBS | DIASTOLIC BLOOD PRESSURE: 93 MMHG

## 2020-11-09 VITALS
SYSTOLIC BLOOD PRESSURE: 140 MMHG | HEART RATE: 96 BPM | DIASTOLIC BLOOD PRESSURE: 93 MMHG | RESPIRATION RATE: 16 BRPM | TEMPERATURE: 99.1 F

## 2020-11-09 LAB
GLUCOSE BLD-MCNC: 179 MG/DL (ref 70–99)
GLUCOSE BLD-MCNC: 222 MG/DL (ref 70–99)
PERFORMED ON: ABNORMAL
PERFORMED ON: ABNORMAL

## 2020-11-09 PROCEDURE — G0277 HBOT, FULL BODY CHAMBER, 30M: HCPCS

## 2020-11-09 PROCEDURE — 99212 OFFICE O/P EST SF 10 MIN: CPT

## 2020-11-09 PROCEDURE — 99213 OFFICE O/P EST LOW 20 MIN: CPT | Performed by: INTERNAL MEDICINE

## 2020-11-09 PROCEDURE — 99183 HYPERBARIC OXYGEN THERAPY: CPT | Performed by: INTERNAL MEDICINE

## 2020-11-09 RX ORDER — LIDOCAINE 40 MG/G
CREAM TOPICAL ONCE
Status: DISCONTINUED | OUTPATIENT
Start: 2020-11-09 | End: 2020-11-10 | Stop reason: HOSPADM

## 2020-11-09 ASSESSMENT — PAIN - FUNCTIONAL ASSESSMENT
PAIN_FUNCTIONAL_ASSESSMENT: PREVENTS OR INTERFERES SOME ACTIVE ACTIVITIES AND ADLS

## 2020-11-09 ASSESSMENT — PAIN DESCRIPTION - PROGRESSION
CLINICAL_PROGRESSION: GRADUALLY IMPROVING

## 2020-11-09 ASSESSMENT — PAIN DESCRIPTION - PAIN TYPE
TYPE: CHRONIC PAIN

## 2020-11-09 ASSESSMENT — PAIN DESCRIPTION - ONSET
ONSET: ON-GOING

## 2020-11-09 ASSESSMENT — PAIN SCALES - GENERAL
PAINLEVEL_OUTOF10: 0
PAINLEVEL_OUTOF10: 1
PAINLEVEL_OUTOF10: 1

## 2020-11-09 ASSESSMENT — PAIN DESCRIPTION - LOCATION
LOCATION: FOOT

## 2020-11-09 ASSESSMENT — PAIN DESCRIPTION - DESCRIPTORS
DESCRIPTORS: CONSTANT
DESCRIPTORS: CONSTANT
DESCRIPTORS: DULL

## 2020-11-09 ASSESSMENT — PAIN DESCRIPTION - FREQUENCY
FREQUENCY: CONTINUOUS

## 2020-11-09 ASSESSMENT — PAIN DESCRIPTION - ORIENTATION
ORIENTATION: LEFT

## 2020-11-09 ASSESSMENT — PAIN DESCRIPTION - DIRECTION: RADIATING_TOWARDS: DENIES

## 2020-11-09 NOTE — PLAN OF CARE
Pt to the [facility] for follow up appointment. Wounds measuring smaller. Swelling less today. Pt to continue with biweekly dressing. Pt to continue HBOT and IV antibiotics. Pt to have dressing change on Thursday after HBOT. Pt to follow up in the [facility] with Dr Pina Zazueta in 1 week. Discharge instructions reviewed with patient, all questions answered, copy given to patient. Dressings were applied to all wounds per M.D. Instructions at this visit.

## 2020-11-09 NOTE — DISCHARGE INSTR - COC
Continuity of Care Form    Patient Name: Chrissy Amor   :  1975  MRN:  [id number]    Admit date:  2020  Discharge date:  ***    Code Status Order: Prior   Advance Directives:   [address] [facility] Documentation     Date/Time Healthcare Directive Type of Healthcare Directive Copy in [address] Agent's Name Healthcare Agent's Phone Number    20 1111  Yes, patient has an advance directive for healthcare treatment  Durable power of  for health care  Yes, copy in chart  [address]  [phone]          Admitting Physician:  No admitting provider for patient encounter. PCP: Daniela Walker MD    Discharging Nurse: [facility] Unit/Room#: No information available for this encounter. Discharging Unit Phone Number: ***    Emergency Contact:   Extended Emergency Contact Information  Primary Emergency Contact: Juarez Osei  Address: [address] [place] [address], [address] Phone: [phone]  Relation: Spouse    Past Surgical History:  Past Surgical History:   Procedure Laterality Date    KNEE ARTHROSCOPY Left [id number]    LEFT KNEE ARTHROSCOPY , SYNOVECTOMY       OTHER SURGICAL HISTORY Left 2020    PARTIAL LEFT FOOT AMPUTATION    TOE AMPUTATION Right 2019    PARTIAL RIGHT FOOT AMPUTATION performed by Payal Schneider DPM at [facility] Left 2020    PARTIAL LEFT FO[facility] TOE AMPUTATION Left 10/22/2020    PARTIAL LEFT FOOT AMPUTATION WITH GRAFT APPLICATION performed by Payal Schneider DPM at [facility] EXTRACTION         Immunization History: There is no immunization history on file for this patient.     Active Problems:  Patient Active Problem List   Diagnosis Code    Hypertension I10    Uncontrolled type 2 diabetes mellitus with diabetic polyneuropathy ([facility] E11.42, E11.65    Cardiomyopathy ([facility] I42.9    Mixed hyperlipidemia E78.2    Diabetic ulcer of left forefoot associated with type 2 diabetes mellitus, with necrosis of bone ([facility]) E11.621, L97.524    Cellulitis of left foot L03. 116    Allergic rhinitis J30.9    Osteoarthritis M19.90    Acute osteomyelitis of metatarsal bone of left foot ([facility]) M86.172    Surgical wound dehiscence, initial encounter T81.31XA    Compromised soft 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 toe amputation site T86.821    Chronic osteomyelitis of left foot ([facility]) X97.484       Isolation/Infection:   Isolation          No Isolation        Patient Infection Status     Infection Onset Added Last Indicated Last Indicated By Review Planned Expiration Resolved Resolved By    None active    Resolved    COVID-19 Rule Out 10/20/20 10/20/20 10/20/20 COVID-19 (Ordered)   10/20/20 Rule-Out Test Resulted          Nurse Assessment:  Last Vital Signs: BP (!) 140/93   Pulse 96   Temp 99.1 °F (37.3 °C) (Oral)   Resp 16     Last documented pain score (0-10 scale): Pain Level: 1  Last Weight:   Wt Readings from Last 1 Encounters:   10/27/20 262 lb (118.8 kg)     Mental Status:  {IP PT MENTAL STATUS:61060}    IV Access:  { DELIO IV ACCESS:455535887}    Nursing Mobility/ADLs:  Walking   {CHP DME QJVT:801129997}  Transfer  {CHP DME OLNE:744244371}  Bathing  {CHP DME UOAS:782847032}  Dressing  {CHP DME PELK:202372718}  Toileting  {CHP DME TEVQ:489793946}  Feeding  {CHP DME ZQCC:451509479}  Med Admin  {CHP DME SVLW:204833553}  Med Delivery   { DELIO MED Delivery:504974083}    Wound Care Documentation and Therapy:  Wound 07/31/20 Proximal #1, Left Great toe amp site, DFU, Reyna 4, onset 7/24/20 (Active)   Wound Image   11/06/20 1203   Wound Etiology Diabetic Reyna 4 11/06/20 1146   Dressing Status New dressing applied;Clean;Dry; Intact 11/06/20 1211   Wound Cleansed Irrigated with saline; Soap and water 11/06/20 1146   Dressing/Treatment Other (comment) 11/06/20 1211   Offloading for Diabetic Foot Ulcers Football dressing 11/06/20 1211   Wound Length (cm) 5 cm 11/06/20 1146   Wound Width (cm) 1.4 cm 11/06/20 1146   Wound Depth (cm) 3.3 cm 11/06/20 1146   Wound Surface Area (cm^2) 7 cm^2 11/06/20 1146   Change in Wound Size % (l*w) 71.77 11/06/20 1146   Wound Volume (cm^3) 23.1 cm^3 11/06/20 1146   Wound Healing % 33 11/06/20 1146   Post-Procedure Length (cm) 5 cm 11/06/20 1203   Post-Procedure Width (cm) 1.4 cm 11/06/20 1203   Post-Procedure Depth (cm) 3.3 cm 11/06/20 1203   Post-Procedure Surface Area (cm^2) 7 cm^2 11/06/20 1203   Post-Procedure Volume (cm^3) 23.1 cm^3 11/06/20 1203   Distance Tunneling (cm) 0 cm 10/19/20 0820   Tunneling Position ___ O'Clock 0 10/19/20 0820   Undermining Starts ___ O'Clock 6 10/19/20 0820   Undermining Ends___ O'Clock 9 10/19/20 0820   Undermining Maxium Distance (cm) .6 10/19/20 0820   Wound Assessment Pink/red;Slough 11/06/20 1146   Drainage Amount Moderate 11/06/20 1146   Drainage Description Serosanguinous 11/06/20 1146   Odor None 11/06/20 1146   Shanita-wound Assessment Maceration;Blanchable erythema 11/06/20 1146   Margins Defined edges; Unattached edges 11/05/20 1333   Number of days: 100       Wound 11/02/20 #6, left second toe, diabetic 2,  full thickness, onset 11/2/2020 (Active)   Wound Image   11/02/20 1314   Wound Etiology Diabetic Reyna 2 11/06/20 1146   Dressing Status New dressing applied;Clean;Dry; Intact 11/06/20 1211   Wound Cleansed Cleansed with saline; Soap and water 11/06/20 1146   Dressing/Treatment Other (comment) 11/06/20 1211   Offloading for Diabetic Foot Ulcers Football dressing 11/06/20 1211   Wound Length (cm) 2.5 cm 11/06/20 1146   Wound Width (cm) 1.5 cm 11/06/20 1146   Wound Depth (cm) 0.6 cm 11/06/20 1146   Wound Surface Area (cm^2) 3.75 cm^2 11/06/20 1146   Change in Wound Size % (l*w) -13.64 11/06/20 1146   Wound Volume (cm^3) 2.25 cm^3 11/06/20 1146   Wound Healing % -241 11/06/20 1146   Post-Procedure Length (cm) 2.5 cm 20 1211   Post-Procedure Width (cm) 1.5 cm 20 1211   Post-Procedure Depth (cm) 0.6 cm 20 1211   Post-Procedure Surface Area (cm^2) 3.75 cm^2 20 1211   Post-Procedure Volume (cm^3) 2.25 cm^3 20 1211   Wound Assessment Pink/red;Slough 20 1146   Drainage Amount Moderate 20 1146   Drainage Description Serosanguinous 20 1146   Odor None 20 1146   Shanita-wound Assessment Maceration;Blanchable erythema 20 1146   Number of days: 6        Elimination:  Continence:   · Bowel: {YES / CI:19537}  · Bladder: {YES / QR:93683}  Urinary Catheter: {Urinary Catheter:053151243}   Colostomy/Ileostomy/Ileal Conduit: {YES / COUCH:32964}       Date of Last BM: ***  No intake or output data in the 24 hours ending 20 1321  No intake/output data recorded.     Safety Concerns:     [address] [employer] Safety Concerns:904064695}    Impairments/Disabilities:      [address] [employer] Impairments/Disabilities:676889892}    Nutrition Therapy:  Current Nutrition Therapy:   [address] [employer] Diet List:080475323}    Routes of Feeding: {CHP DME Other Feedings:477635783}  Liquids: {Slp liquid thickness:98974}  Daily Fluid Restriction: {CHP DME Yes amt example:276394111}  Last Modified Barium Swallow with Video (Video Swallowing Test): {Done Not Done FYQT:555300126}    Treatments at the Time of Hospital Discharge:   Respiratory Treatments: ***  Oxygen Therapy:  {Therapy; copd oxygen:95708}  Ventilator:    {[facility] Vent UPUN:695112933}    Rehab Therapies: {THERAPEUTIC INTERVENTION:0313222366}  Weight Bearing Status/Restrictions: [employer]}  Other Medical Equipment (for information only, NOT a DME order):  {EQUIPMENT:210858254}  Other Treatments: ***    Patient's personal belongings (please select all that are sent with patient):  {P DME Belongings:766842926}    RN SIGNATURE:  {Esignature:133808618}    CASE MANAGEMENT/SOCIAL WORK SECTION    Inpatient Status Date: ***    Readmission Risk Assessment

## 2020-11-10 ENCOUNTER — HOSPITAL ENCOUNTER (OUTPATIENT)
Dept: HYPERBARIC MEDICINE | Age: 45
Discharge: HOME | End: 2020-11-10
Payer: COMMERCIAL

## 2020-11-10 ENCOUNTER — OFFICE VISIT (OUTPATIENT)
Dept: INTERNAL MEDICINE CLINIC | Age: 45
End: 2020-11-10

## 2020-11-10 ENCOUNTER — HOSPITAL ENCOUNTER (OUTPATIENT)
Age: 45
Setting detail: SPECIMEN
Discharge: HOME | End: 2020-11-10
Payer: COMMERCIAL

## 2020-11-10 VITALS
RESPIRATION RATE: 18 BRPM | SYSTOLIC BLOOD PRESSURE: 130 MMHG | DIASTOLIC BLOOD PRESSURE: 78 MMHG | TEMPERATURE: 97.5 F | HEART RATE: 70 BPM | HEIGHT: 72 IN | WEIGHT: 262 LBS | BODY MASS INDEX: 35.49 KG/M2

## 2020-11-10 VITALS
HEART RATE: 96 BPM | DIASTOLIC BLOOD PRESSURE: 80 MMHG | RESPIRATION RATE: 16 BRPM | TEMPERATURE: 98.7 F | SYSTOLIC BLOOD PRESSURE: 126 MMHG

## 2020-11-10 LAB
A/G RATIO: 1.8 (ref 1.1–2.2)
ALBUMIN SERPL-MCNC: 4.2 G/DL (ref 3.4–5)
ALP BLD-CCNC: 159 U/L (ref 40–129)
ALT SERPL-CCNC: 25 U/L (ref 10–40)
ANION GAP SERPL CALCULATED.3IONS-SCNC: 11 MMOL/L (ref 3–16)
AST SERPL-CCNC: 26 U/L (ref 15–37)
BASOPHILS ABSOLUTE: 0 K/UL (ref 0–0.2)
BASOPHILS RELATIVE PERCENT: 0.4 %
BILIRUB SERPL-MCNC: 2.2 MG/DL (ref 0–1)
BUN BLDV-MCNC: 22 MG/DL (ref 7–20)
CALCIUM SERPL-MCNC: 9.1 MG/DL (ref 8.3–10.6)
CHLORIDE BLD-SCNC: 98 MMOL/L (ref 99–110)
CO2: 27 MMOL/L (ref 21–32)
CREAT SERPL-MCNC: 0.6 MG/DL (ref 0.9–1.3)
EOSINOPHILS ABSOLUTE: 0.2 K/UL (ref 0–0.6)
EOSINOPHILS RELATIVE PERCENT: 2.5 %
GFR AFRICAN AMERICAN: >60
GFR NON-AFRICAN AMERICAN: >60
GLOBULIN: 2.4 G/DL
GLUCOSE BLD-MCNC: 175 MG/DL (ref 70–99)
GLUCOSE BLD-MCNC: 181 MG/DL (ref 70–99)
GLUCOSE BLD-MCNC: 221 MG/DL (ref 70–99)
HCT VFR BLD CALC: 38.8 % (ref 40.5–52.5)
HEMOGLOBIN: 13.3 G/DL (ref 13.5–17.5)
LYMPHOCYTES ABSOLUTE: 3 K/UL (ref 1–5.1)
LYMPHOCYTES RELATIVE PERCENT: 31.3 %
MCH RBC QN AUTO: 31.2 PG (ref 26–34)
MCHC RBC AUTO-ENTMCNC: 34.4 G/DL (ref 31–36)
MCV RBC AUTO: 90.7 FL (ref 80–100)
MONOCYTES ABSOLUTE: 0.8 K/UL (ref 0–1.3)
MONOCYTES RELATIVE PERCENT: 8.9 %
NEUTROPHILS ABSOLUTE: 5.4 K/UL (ref 1.7–7.7)
NEUTROPHILS RELATIVE PERCENT: 56.9 %
PDW BLD-RTO: 13.6 % (ref 12.4–15.4)
PERFORMED ON: ABNORMAL
PERFORMED ON: ABNORMAL
PLATELET # BLD: 240 K/UL (ref 135–450)
PMV BLD AUTO: 7.5 FL (ref 5–10.5)
POTASSIUM SERPL-SCNC: 4.8 MMOL/L (ref 3.5–5.1)
RBC # BLD: 4.28 M/UL (ref 4.2–5.9)
SEDIMENTATION RATE, ERYTHROCYTE: 57 MM/HR (ref 0–15)
SODIUM BLD-SCNC: 136 MMOL/L (ref 136–145)
TOTAL CK: 48 U/L (ref 39–308)
TOTAL PROTEIN: 6.6 G/DL (ref 6.4–8.2)
WBC # BLD: 9.4 K/UL (ref 4–11)

## 2020-11-10 PROCEDURE — G8484 FLU IMMUNIZE NO ADMIN: HCPCS | Performed by: INTERNAL MEDICINE

## 2020-11-10 PROCEDURE — 99183 HYPERBARIC OXYGEN THERAPY: CPT | Performed by: INTERNAL MEDICINE

## 2020-11-10 PROCEDURE — 80061 LIPID PANEL: CPT

## 2020-11-10 PROCEDURE — 3051F HG A1C>EQUAL 7.0%<8.0%: CPT | Performed by: INTERNAL MEDICINE

## 2020-11-10 PROCEDURE — 86140 C-REACTIVE PROTEIN: CPT

## 2020-11-10 PROCEDURE — 99213 OFFICE O/P EST LOW 20 MIN: CPT | Performed by: INTERNAL MEDICINE

## 2020-11-10 PROCEDURE — 36415 COLL VENOUS BLD VENIPUNCTURE: CPT

## 2020-11-10 PROCEDURE — G8427 DOCREV CUR MEDS BY ELIG CLIN: HCPCS | Performed by: INTERNAL MEDICINE

## 2020-11-10 PROCEDURE — 85025 COMPLETE CBC W/AUTO DIFF WBC: CPT

## 2020-11-10 PROCEDURE — 1036F TOBACCO NON-USER: CPT | Performed by: INTERNAL MEDICINE

## 2020-11-10 PROCEDURE — 80053 COMPREHEN METABOLIC PANEL: CPT

## 2020-11-10 PROCEDURE — 83721 ASSAY OF BLOOD LIPOPROTEIN: CPT

## 2020-11-10 PROCEDURE — 2022F DILAT RTA XM EVC RTNOPTHY: CPT | Performed by: INTERNAL MEDICINE

## 2020-11-10 PROCEDURE — 82550 ASSAY OF CK (CPK): CPT

## 2020-11-10 PROCEDURE — G0277 HBOT, FULL BODY CHAMBER, 30M: HCPCS

## 2020-11-10 PROCEDURE — G8417 CALC BMI ABV UP PARAM F/U: HCPCS | Performed by: INTERNAL MEDICINE

## 2020-11-10 PROCEDURE — 85652 RBC SED RATE AUTOMATED: CPT

## 2020-11-10 RX ORDER — CARVEDILOL 12.5 MG/1
12.5 TABLET ORAL 2 TIMES DAILY WITH MEALS
Qty: 180 TABLET | Refills: 1 | Status: SHIPPED | OUTPATIENT
Start: 2020-11-10 | End: 2021-02-12

## 2020-11-10 RX ORDER — TRAZODONE HYDROCHLORIDE 50 MG/1
50 TABLET ORAL NIGHTLY
Qty: 90 TABLET | Refills: 1 | Status: SHIPPED | OUTPATIENT
Start: 2020-11-10 | End: 2021-02-23

## 2020-11-10 RX ORDER — GABAPENTIN 400 MG/1
800 CAPSULE ORAL 3 TIMES DAILY
Qty: 540 CAPSULE | Refills: 0 | Status: SHIPPED | OUTPATIENT
Start: 2020-11-10 | End: 2021-03-11

## 2020-11-10 RX ORDER — FUROSEMIDE 20 MG/1
20 TABLET ORAL DAILY
Qty: 90 TABLET | Refills: 1 | Status: SHIPPED | OUTPATIENT
Start: 2020-11-10 | End: 2021-03-16

## 2020-11-10 RX ORDER — INSULIN LISPRO 100 [IU]/ML
25 INJECTION, SOLUTION INTRAVENOUS; SUBCUTANEOUS
Qty: 67.5 ML | Refills: 2 | Status: SHIPPED | OUTPATIENT
Start: 2020-11-10 | End: 2020-12-21

## 2020-11-10 RX ORDER — GABAPENTIN 400 MG/1
CAPSULE ORAL
Qty: 180 CAPSULE | Refills: 0 | Status: SHIPPED | OUTPATIENT
Start: 2020-11-10 | End: 2020-11-10 | Stop reason: SDUPTHER

## 2020-11-10 RX ORDER — INSULIN GLARGINE 300 U/ML
75 INJECTION, SOLUTION SUBCUTANEOUS NIGHTLY
Qty: 24 PEN | Refills: 1 | Status: SHIPPED | OUTPATIENT
Start: 2020-11-10 | End: 2021-01-01 | Stop reason: SDUPTHER

## 2020-11-10 RX ORDER — LOSARTAN POTASSIUM 50 MG/1
50 TABLET ORAL DAILY
Qty: 90 TABLET | Refills: 0 | Status: SHIPPED | OUTPATIENT
Start: 2020-11-10 | End: 2021-04-06

## 2020-11-10 RX ORDER — TIZANIDINE 4 MG/1
8 TABLET ORAL NIGHTLY
Qty: 180 TABLET | Refills: 0 | Status: SHIPPED | OUTPATIENT
Start: 2020-11-10 | End: 2021-02-04

## 2020-11-10 ASSESSMENT — PATIENT HEALTH QUESTIONNAIRE - PHQ9
SUM OF ALL RESPONSES TO PHQ9 QUESTIONS 1 & 2: 0
SUM OF ALL RESPONSES TO PHQ QUESTIONS 1-9: 0
SUM OF ALL RESPONSES TO PHQ QUESTIONS 1-9: 0
2. FEELING DOWN, DEPRESSED OR HOPELESS: 0
1. LITTLE INTEREST OR PLEASURE IN DOING THINGS: 0
SUM OF ALL RESPONSES TO PHQ QUESTIONS 1-9: 0

## 2020-11-10 ASSESSMENT — PAIN SCALES - GENERAL
PAINLEVEL_OUTOF10: 0
PAINLEVEL_OUTOF10: 0

## 2020-11-10 NOTE — PROGRESS NOTES
Subjective:      Patient ID: Brayan Dale is a 39 y.o. male. HPI      39 y.o. . Male with known h.o DM-2, HTN, cardiomopathy, CHF, neuropathy here for regular f.w    7/20-admitted to [facility] with  left great toe  infection, s.p first ray amputation and was treated with IV abx   Improved and sent home - recently had issues with wound infection and continued treatment for active infection at previous surgery site noted  Now ongoing wound care by Dr. Yuli Leggett and Mateo   Now on IV abx again - IV daptomycin and merrem , ongoing HBO tx daily         DM- 2 IDDM with complications- neuropathy and foot ulcers  S/p right great toe amputation and left great toe and 2nd toe amputations      He has been very careful about sugars and monitoring it 3 times daily and was compliant with insulin for few months  Sugars range  In am upto 120 and 180 , on Toujeo at 75 units nightly and novolog 25 units TID with meals  No low sugars  Reports neuropathy symptoms getting worse       Has h.o non ischemic cardiomyopathy . EF noted low at 20-25 % (2014)and treated with diuresis , b blockers, digoxin  , improved EF on f.w ECHo  Able to work  Normally with no residual dyspnea or pedal edema. No chest pain or nocturnal cough-   Currently on coreg and losartan and lasix  only. Off digoxin. Aldactone         Has severe Neuropathy in extremities with symptoms of tingling and numbness in hands . Palms are hypersensitive. Now on neurontin tid, off tramadol and pain meds. Unable to afford lyrica.      Anxiety improved on paxil     HTN - recently office readings remain stable with meds - on losartan, coreg,   Being compliant    Current Outpatient Medications   Medication Sig Dispense Refill    carvedilol (COREG) 12.5 MG tablet Take 1 tablet by mouth 2 times daily (with meals) 180 tablet 1    furosemide (LASIX) 20 MG tablet Take 1 tablet by mouth daily 90 tablet 1    insulin lispro, 1 Unit Dial, (HUMALOG KWIKPEN) 100 UNIT/ML SOPN Inject 25 Units into the skin 3 times daily (before meals) 67.5 mL 2    Insulin Glargine, 1 Unit Dial, (TOUJEO SOLOSTAR) 300 UNIT/ML SOPN Inject 75 Units into the skin nightly 24 pen 1    tiZANidine (ZANAFLEX) 4 MG tablet Take 2 tablets by mouth nightly 180 tablet 0    traZODone (DESYREL) 50 MG tablet Take 1 tablet by mouth nightly 90 tablet 1    losartan (COZAAR) 50 MG tablet Take 1 tablet by mouth daily 90 tablet 0    gabapentin (NEURONTIN) 400 MG capsule Take 2 capsules by mouth 3 times daily for 90 days. TAKE 2 CAPSULES BY MOUTH THREE TIMES DAILY. 540 capsule 0    oxyCODONE-acetaminophen (PERCOCET) 5-325 MG per tablet Take 1 tablet by mouth every 6 hours as needed for Pain for up to 7 days. Take lowest dose possible to manage pain 28 tablet 0    oxymetazoline (AFRIN) 0.05 % nasal spray 2 sprays by Nasal route daily Indications: prior to HBO      meropenem (MERREM) infusion Infuse 1,000 mg intravenously every 8 hours for 28 days Compound per protocol. 1 each 0    daptomycin (CUBICIN) infusion Infuse 750 mg intravenously every 24 hours for 28 days Compound per protocol. 97764 mg 0    fluticasone (FLONASE) 50 MCG/ACT nasal spray 2 sprays by Each Nostril route daily      Aspirin-Acetaminophen-Caffeine (EXCEDRIN MIGRAINE PO) Take 2 capsules by mouth as needed       glimepiride (AMARYL) 4 MG tablet Take 1 tablet by mouth every morning (before breakfast) 90 tablet 1    PARoxetine (PAXIL) 10 MG tablet Take 1 tablet by mouth every morning 90 tablet 1    loratadine (CLARITIN) 10 MG tablet Take 10 mg by mouth nightly as needed       simvastatin (ZOCOR) 20 MG tablet TAKE 1 TABLET BY MOUTH NIGHTLY 30 tablet 3    ammonium lactate (LAC-HYDRIN) 12 % cream Apply topically 2x daily.  1 Tube 6    sildenafil (VIAGRA) 100 MG tablet TAKE 1 TABLET BY MOUTH AS NEEDED FOR ERECTILE DYSFUNCTION 15 tablet 0    insulin lispro (HUMALOG KWIKPEN) 100 UNIT/ML pen Inject 15 Units into the skin 3 times daily (before meals) (Patient taking differently: Inject into the skin 3 times daily (before meals) Patient takes per sliding scale plus he adds 15 units to it) 5 pen 0     No current facility-administered medications for this visit. Review of Systems    Positive for wound, soft stools but no diarrhea  No weight changes  No fevers  + neuropathy and pain     Objective:   Physical Exam     Vitals:    11/10/20 0807   BP: 130/78   Pulse: 70   Resp: 18   Temp: 97.5 °F (36.4 °C)         General: young male,  Awake, alert and oriented. Appears to be not in any distress  Mucous Membranes:  Pink , anicteric  Neck: No JVD, no carotid bruit, no thyromegaly  Chest:  Clear to auscultation bilaterally, no added sounds  Cardiovascular:  RRR S1S2 heard, no murmurs or gallops  Abdomen:  Soft, undistended, non tender, no organomegaly, BS present  Extremities: left foot in dressing, left UE picc noted  No edema or cyanosis. Distal pulses well felt  Neurological : grossly normal      Lab Results   Component Value Date    LABA1C 7.8 10/26/2020     Lab Results   Component Value Date    .2 10/26/2020       Assessment:       Diagnosis Orders   1. Essential hypertension     2. Chronic systolic congestive heart failure (HCC)  furosemide (LASIX) 20 MG tablet   3. Diabetic peripheral neuropathy associated with type 2 diabetes mellitus (HCC)  gabapentin (NEURONTIN) 400 MG capsule    DISCONTINUED: gabapentin (NEURONTIN) 400 MG capsule   4. Uncontrolled type 2 diabetes mellitus with diabetic polyneuropathy (HCC)  Lipid, Fasting   5. Diabetic ulcer of left forefoot associated with type 2 diabetes mellitus, with necrosis of bone ([facility]     6.  Mixed hyperlipidemia          Plan:          DM- 2 chronically uncontrolled with symptoms and complications  - peripheral neuropathy, ED , foot ulcers  - s/p  right and left great toe amputations and now has active left foot wound     - continue aggressive glucose control - last A1c improved from 11 to7.8     - improved  Compliance

## 2020-11-10 NOTE — PROGRESS NOTES
[facility] Progress Note    Mike Siddiqui     : 1975    DATE OF VISIT:  2020    Subjective:     Mike Siddiqui is a 39 y.o. male who has a diabetic and  dehisced surgical ulcer located on the foot (right, medial, forefoot). Current complaint of pain in this ulcer? yes. Quality of pain: aching, burning, sharp and throbbing  Timing: intermittent and decreasing in frequency  Severity: mild-moderate, improved from Friday  Associated Signs/Symptoms: a bit less swelling, less redness, moderate serosanguinous drainage  Other significant symptoms or pertinent ulcer history: no F/C/D. Tried to stay off his foot more over the weekend. Did order an OrthoWedge shoe, expects delivery tomorrow. Tolerating HBO well, just some very mild and asymptomatic barotrauma. Tolerating Abx well, no PICC discomfort or pruritus, no sore throat or mouth, no N/V/D, no drug rash. Additional ulcer(s) noted? no.      Mr. Haroon Ashley has a past medical history of Acute osteomyelitis of right hallux ([address] CHF (congestive heart failure) ([address] Clostridium difficile infection, Diabetic ulcer of right great toe associated with type 2 diabetes mellitus, with necrosis of bone ([address] Migraine, Possible perforated tympanic membrane, Recurrent otitis media, Tear of medial meniscus of left knee, Tobacco use, and Toe osteomyelitis, left ([address] He has a past surgical history that includes Tonsillectomy; [place] tooth extraction; Knee arthroscopy (Left, [id number]); Toe amputation (Right, 2019); other surgical history (Left, 2020); Toe amputation (Left, 2020); and Toe amputation (Left, 10/22/2020). His family history includes Diabetes in his father and mother; High Blood Pressure in his father, maternal grandmother, maternal uncle, mother, and sister; High Cholesterol in his father, maternal uncle, and mother; Stroke in his father.      Mr. Haroon Ashley reports that he quit smoking about 15 years ago. He has a 1.00 pack-year smoking history. He quit smokeless tobacco use about 15 years ago. He reports current alcohol use. He reports that he does not use drugs. His current medication list consists of Aspirin-Acetaminophen-Caffeine, Insulin Glargine (1 Unit Dial), PARoxetine, ammonium lactate, carvedilol, daptomycin, fluticasone, furosemide, gabapentin, glimepiride, insulin lispro, insulin lispro (1 Unit Dial), loratadine, losartan, meropenem, oxyCODONE-acetaminophen, oxymetazoline, sildenafil, simvastatin, tiZANidine, and traZODone. He's had almost 2 weeks of IV Abx at this point. Allergies: Januvia [sitagliptin]; Metformin and related; Vancomycin; and Mustard oil [allyl isothiocyanate]    Pertinent items from the review of systems are discussed in the HPI; the remainder of the ROS was reviewed and is negative. Objective:     Vitals:    11/09/20 1316   BP: (!) 140/93   Pulse: 96   Resp: 16   Temp: 99.1 °F (37.3 °C)   TempSrc: Oral   Weight: 263 lb (119.3 kg)   Height: 6' (1.829 m)       Constitutional:  well-developed, well-nourished, overweight, NAD, does look in less pain than on Friday. Psychiatric:  oriented to person, place and time; mood and affect appropriate for the situation   Eyes:  pupils equal, round and reactive to light; sclerae anicteric, conjunctivae not pale  ENT: no thrush or oral ulcers, mucous membranes moist  Abd: soft, NT, ND, good BS  Cardiovascular:  bilateral pedal pulses palpable; milder right lower extremity edema  Lymphatic:  no inguinal or popliteal adenopathy, no angitis or spreading cellulitis  Musculoskeletal:  no clubbing, cyanosis or petechiae; RLE and LLE with no gross effusions, joint misalignment or acute arthritis  Shanita-ulcer skin: indurated, pink, erythematous , warm and minimal maceration, still a bit of focal swelling over base of 2nd ray, dorsally, less than Friday.   Ulcer(s): 1st ray wound mostly granular, part hypergranular, still a bit of necrotic SQ tissue, palpable bone; 2nd ray wound dehisced, still some depth, less superficial necrotic debris there, no pus. Photos also saved in electronic chart. Today's Wound Measurements, per RN documentation:  Wound 11/02/20 #6, left second toe, diabetic 2,  full thickness, onset 11/2/2020-Wound Length (cm): 1 cm  Wound 07/31/20 Proximal #1, Left Great toe amp site, DFU, Reyna 4, onset 7/24/20-Wound Length (cm): 4 cm    Wound 11/02/20 #6, left second toe, diabetic 2,  full thickness, onset 11/2/2020-Wound Width (cm): 0.9 cm  Wound 07/31/20 Proximal #1, Left Great toe amp site, DFU, Reyna 4, onset 7/24/20-Wound Width (cm): 1.5 cm    Wound 11/02/20 #6, left second toe, diabetic 2,  full thickness, onset 11/2/2020-Wound Depth (cm): 0.9 cm  Wound 07/31/20 Proximal #1, Left Great toe amp site, DFU, Reyna 4, onset 7/24/20-Wound Depth (cm): 2.2 cm  ______________________________    Lab Results   Component Value Date    LABALBU 4.1 11/02/2020     Lab Results   Component Value Date    CREATININE <0.5 (L) 11/02/2020     Lab Results   Component Value Date    HGB 13.0 (L) 11/02/2020     Lab Results   Component Value Date    LABA1C 7.8 10/26/2020       Recent lab trends (with pre-op Augmentin, OR on Oct 22, then IV Abx starting on about Oct 27) --     Oct 26 --          Creat 0.7, CK 26, cRP 107.4, alb 3.9, alk phos 169, WBC 11.2, Hgb 13.1, plts 302k, 200 Eos, .     Nov 2 --           Creat < 0.5, CK 60, cRP 32.4, alb 4.1, alk phos 185, WBC 14.7, Hgb 13.0, plts 392k, 300 Eos, ESR 79. Nov 9 -- Labs pending for this week. Assessment:     Patient Active Problem List   Diagnosis Code    Hypertension I10    Uncontrolled type 2 diabetes mellitus with diabetic polyneuropathy (HCC) E11.42, E11.65    Cardiomyopathy ([facility] I42.9    Mixed hyperlipidemia E78.2    Diabetic ulcer of left forefoot associated with type 2 diabetes mellitus, with necrosis of bone ([facility] E11.621, L97.524    Cellulitis of left foot L03. 116    Allergic rhinitis J30.9    Osteoarthritis M19.90    Acute osteomyelitis of metatarsal bone of left foot ([facility] M86.172    Surgical wound dehiscence, initial encounter T81.31XA    Compromised soft 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 toe amputation site T86.821    Chronic osteomyelitis of left foot (HCC) M86.672       Assessment of today's active condition(s): uncontrolled, but much improved, DM2, dense neuropathy, no signs of large-vessel PAD, Reyna 3 diabetic foot with recent 1st metatarsal debridement, 2nd toe amp -- presumed residual osteo at 1st met, with wound dehiscence there, and then compromised flap at 2nd toe amp site, with dehiscence there. Increased pain, swelling and redness late last week I think from hematoma, looks to be improving. Tolerating Abx and HBOT well; not sure if Abx will be closer to 4 weeks or 6, given mixed acute and chronic disease on path exam. Factors contributing to occurrence and/or persistence of the chronic ulcer include edema, diabetes, poor glucose control, shear force, obesity, decreased tissue oxygenation and non-adherence. Medical necessity of today's visit is shown by the above documentation. Sharp debridement per Dr. Lisa Cruz. Discharge plan:     Treatment in the wound care center today, per RN documentation: Wound 11/02/20 #6, left second toe, diabetic 2,  full thickness, onset 11/2/2020-Dressing/Treatment: (gauze, ABD,kerlix, Coban)  Wound 07/31/20 Proximal #1, Left Great toe amp site, DFU, Reyna 4, onset 7/24/20-Dressing/Treatment: (iodoform,pkg, Gauze, ABD,kerlix,Coban). Local care per Dr. Lisa Cruz. Ongoing analgesia plan from him also, though I called in some Percocet on Friday for his increased pain, I think from hematoma. Continue to keep ambulation on the right foot to a minimum (crutches, wheelchair, rolling knee walker, etc) -- await OrthoWedge shoe for use when he must take some steps on the right foot. Continue HBOT. Continue current Abx.  Will watch closely for allergic manifestations, Candidiasis, Cdiff, PICC complications, etc; weekly labs ordered, weekly PICC dressings being done. Tentatively planning for 4-6 weeks of postop Abx. Keep working on glucose control and protein intake. Written discharge instructions given to patient. Follow up in 1 week to see Dr. Germaine Hyde and me, but daily for HBOT.     Electronically signed by Shelby Bartholomew MD on 11/10/2020 at 2:31 PM.

## 2020-11-10 NOTE — PROGRESS NOTES
oxymetazoline, sildenafil, simvastatin, tiZANidine, and traZODone. Allergies: Januvia [sitagliptin]; Metformin and related; Vancomycin; and Mustard oil [allyl isothiocyanate]    Pertinent items from the review of systems are discussed in the HPI; the remainder of the ROS was reviewed and is negative. Objective:     BP (!) 140/93   Pulse 96   Temp 99.1 °F (37.3 °C) (Oral)   Resp 16   Ht 6' (1.829 m)   Wt 263 lb (119.3 kg)   BMI 35.67 kg/m²   General Appearance: alert and oriented to person, place and time, well developed and well- nourished, in no acute distress  Skin: warm and dry, no rash or erythema  Head: normocephalic and atraumatic  Eyes: pupils equal, round, and reactive to light, extraocular eye movements intact, conjunctivae normal  ENT: tympanic membrane, external ear and ear canal normal bilaterally, nose without deformity, nasal mucosa and turbinates normal without polyps  Neck: supple and non-tender without mass, no thyromegaly or thyroid nodules, no cervical lymphadenopathy  Pulmonary/Chest: clear to auscultation bilaterally- no wheezes, rales or rhonchi, normal air movement, no respiratory distress  Cardiovascular: normal rate, regular rhythm, normal S1 and S2, no murmurs, rubs, clicks, or gallops, distal pulses intact, no carotid bruits  Abdomen: soft, non-tender, non-distended, normal bowel sounds, no masses or organomegaly.      Dorsalis pedis pulse left palpable  Posterior tibial pulse left palpable  Dorsalis pedis pulse right palpable  Posterior tibial pulse right palpable    Ulcer on the left 2nd digit amputation site with mild fibrotic tissue, red granulation tissue, mild serous drainage, no hyperkeratotic rim, no undermining, no tunneling, no purulence, no malodor, no eschar, no periwound maceration, mild periwound erythema, mild edema, no crepitus, no increase in skin temperature, ulcer probes to soft tissue only     Ulcer on the left hallux amputation site with minimal fibrotic tissue, red granulation tissue, mild serous drainage, no hyperkeratotic rim, no undermining, no tunneling, no purulence, no malodor, no eschar, no periwound maceration, moderate periwound erythema, mild edema, no crepitus, no increase in skin temperature, ulcer probes to bone of the 1st metatarsal in small area    Today's ulcer measurements are in the wound documentation flowsheet.      Wound measurements:  Wound 11/02/20 #6, left second toe, diabetic 2,  full thickness, onset 11/2/2020-Wound Length (cm): 1 cm  Wound 07/31/20 Proximal #1, Left Great toe amp site, DFU, Reyna 4, onset 7/24/20-Wound Length (cm): 4 cm    Wound 11/02/20 #6, left second toe, diabetic 2,  full thickness, onset 11/2/2020-Wound Width (cm): 0.9 cm  Wound 07/31/20 Proximal #1, Left Great toe amp site, DFU, Reyna 4, onset 7/24/20-Wound Width (cm): 1.5 cm    Wound 11/02/20 #6, left second toe, diabetic 2,  full thickness, onset 11/2/2020-Wound Depth (cm): 0.9 cm  Wound 07/31/20 Proximal #1, Left Great toe amp site, DFU, Reyna 4, onset 7/24/20-Wound Depth (cm): 2.2 cm    LABS  Lab Results   Component Value Date    LABA1C 7.8 10/26/2020     Xray left foot - postop changes    Culture -   Pseudomonas aeruginosa (1)     Antibiotic  Interpretation  SIMONE  Status     cefepime  Resistant  >16  mcg/mL      ciprofloxacin  Intermediate  2  mcg/mL      gentamicin  Sensitive  <=4  mcg/mL      meropenem  Sensitive  <=1  mcg/mL      piperacillin-tazobactam  Sensitive  <=16  mcg/mL      tobramycin  Sensitive  <=4  mcg/mL      Enterobacter cloacae complex (2)     Antibiotic  Interpretation  SIMONE  Status     amoxicillin-clavulanate  Resistant  >16/8  mcg/mL      ampicillin  Resistant  >16  mcg/mL      ceFAZolin  Resistant  >16  mcg/mL      cefepime  Sensitive  <=2  mcg/mL      cefTRIAXone  Sensitive  <=1  mcg/mL      cefuroxime  Sensitive  8  mcg/mL      ciprofloxacin  Sensitive  <=1  mcg/mL      ertapenem  Sensitive  <=0.5  mcg/mL      gentamicin  Sensitive  <=4 mcg/mL      meropenem  Sensitive  <=1  mcg/mL      piperacillin-tazobactam  Sensitive  <=16  mcg/mL      trimethoprim-sulfamethoxazole  Sensitive  <=2/38  mcg/mL      Alcaligenes faecalis (3)     Antibiotic  Interpretation  SIMONE  Status     cefepime  Sensitive  4  mcg/mL      cefTRIAXone  Sensitive  <=1  mcg/mL      ciprofloxacin  Sensitive  <=1  mcg/mL      gentamicin  Resistant  <=4  mcg/mL      meropenem  Sensitive  <=1  mcg/mL      piperacillin-tazobactam  Sensitive  <=16  mcg/mL      tobramycin  Sensitive  <=4  mcg/mL      trimethoprim-sulfamethoxazole  Sensitive  <=2/38  mcg/mL          Path - chronic and acute osteomyelitis      Assessment:     Patient Active Problem List   Diagnosis Code    Hypertension I10    Uncontrolled type 2 diabetes mellitus with diabetic polyneuropathy ([facility]) E11.42, E11.65    Cardiomyopathy ([facility] I42.9    Mixed hyperlipidemia E78.2    Diabetic ulcer of left forefoot associated with type 2 diabetes mellitus, with necrosis of bone ([facility]) E11.621, L97.524    Cellulitis of left foot L03. 116    Allergic rhinitis J30.9    Osteoarthritis M19.90    Acute osteomyelitis of metatarsal bone of left foot ([facility]) M86.172    Surgical wound dehiscence, initial encounter T81.31XA    Compromised soft 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 toe amputation site T86.821    Chronic osteomyelitis of left foot ([facility]) M07.690       Assessment of today's active condition(s): osteomyelitis left foot, diabetic foot ulcer left hallux amputation, diabetes mellitus. Factors contributing to occurrence and/or persistence of the chronic ulcer include diabetes and poor glucose control. Sharp debridement is not indicated today, based upon the exam findings in the ulcer(s) above.       Discharge plan:     Treatment in the wound care center today: Wound 11/02/20 #6, left second toe, diabetic 2,  full thickness, onset 11/2/2020-Dressing/Treatment: (gauze, ABD,kerlix, Coban)  Wound 07/31/20 Proximal #1, Left Great toe amp site, DFU, Reyna 4, onset 7/24/20-Dressing/Treatment: (iodoform,pkg, Gauze, ABD,kerlix,Coban). Home treatment: good handwashing before and after any dressing changes. Cleanse ulcer with saline or soap & water before dressing change. May use Vaseline (petrolatum), Aquaphor, Aveeno, CeraVe, Cetaphil, Eucerin, Lubriderm, etc for dry skin. Dressing type for home: Iodoform and dry dressing applied to be changed on Thursday. Written discharge instructions given to patient. Offload ulcer(s) as directed. Elevate leg(s) as directed. Follow up in 1 week. Needs to control glucose levels. HBO and antibiotics as per Dr. Hao Galarza. Discussed in room with Dr. Hao Galarza.        Electronically signed by Naina Kee DPM on 11/10/2020 at 11:54 AM.

## 2020-11-10 NOTE — PLAN OF CARE
Patient to clinic for HBO treatment. Patient VS per doc flow sheet. Patient reports was at PCP this AM. No medication changes at this time. Patient had PICC in [facility], no bio patch, caps removed. Patient . He reports eating around 930 am. Patient resting quietly with television on.

## 2020-11-10 NOTE — PROGRESS NOTES
at pressure, air breaks if applicable, and decompression. Patient Active Problem List   Diagnosis Code    Hypertension I10    Uncontrolled type 2 diabetes mellitus with diabetic polyneuropathy ([facility]) E11.42, E11.65    Cardiomyopathy ([facility] I42.9    Mixed hyperlipidemia E78.2    Diabetic ulcer of left forefoot associated with type 2 diabetes mellitus, with necrosis of bone ([facility]) E11.621, L97.524    Cellulitis of left foot L03. 116    Allergic rhinitis J30.9    Osteoarthritis M19.90    Acute osteomyelitis of metatarsal bone of left foot ([facility]) M86.172    Surgical wound dehiscence, initial encounter T81.31XA    Compromised soft 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 toe amputation site T86.821    Chronic osteomyelitis of left foot ([facility] X63.412       In my clinical judgement, ongoing HBO therapy is necessary at this time, given a threat to patient function, limb or life from the current condition. Plan     1. Hyperbaric Oxygen - [place] Osei tolerated the treatment well today without complications. Continue HBO treatment as outlined above. 2. Other - see ID note from today, re: foot exam, ABx, labs, etc.     I was present on these premises and immediately available to furnish assistance & direction throughout the procedure.      -- Electronically signed by Wilton Miller MD on 11/10/2020 at 10:25 AM

## 2020-11-10 NOTE — PROGRESS NOTES
[address]  Hyperbaric Oxygen    Quincy Shah     : 1975    DATE OF VISIT:  11/10/2020    Subjective     Quincy Shah is a 39 y.o. male who  has a past medical history of Acute osteomyelitis of right hallux ([facility] (2019), CHF (congestive heart failure) ([facility] (2014), Clostridium difficile infection (2016), Diabetic ulcer of right great toe associated with type 2 diabetes mellitus, with necrosis of bone ([facility] (2019), Migraine, Possible perforated tympanic membrane, Recurrent otitis media, Tear of medial meniscus of left knee, Tobacco use, and Toe osteomyelitis, left ([facility] (2020). He presents to the [facility] today for hyperbaric oxygen treatment of his Reyna 3 DFU and failed soft tissue flap, the former of which is refractory to standard therapy for 30 days. Patient denies fever. Patient denies nausea, vomiting or diarrhea; no ear troubles and no other new complaints; no fears or anxiety regarding treatment today. Objective     Vitals:    11/10/20 1110 11/10/20 1308   BP: 106/60 126/80   Pulse: 108 96   Resp: 18 16   Temp: 99.4 °F (37.4 °C) 98.7 °F (37.1 °C)   TempSrc: Oral Oral       General:  Alert, cooperative, no distress. Ears: External otic canals are within acceptable limits. Teed grade 1 on the right and 0 on the left pre-treatment. Teed grade 0 on the right and 0 on the left post-treatment. Lungs:  Clear to auscultation bilaterally. Ulcer: Not examined today in HBO, if applicable. Recent Labs     20  1112 20  1312 11/10/20  1111 11/10/20  1309   POCGLU 179* 222* 175* 221*       Assessment     Quincy Shah is a 39 y.o. male who presented to the [facility] today for hyperbaric oxygen treatment #8 of 30 for the diagnosis as stated above. Treatment given at 2 VINCENT for 90 minutes, with no air breaks.  Total Treatment Time (min): 109 today, including compression, 100% oxygen at pressure, air breaks if applicable, and decompression. Patient Active Problem List   Diagnosis Code    Hypertension I10    Uncontrolled type 2 diabetes mellitus with diabetic polyneuropathy ([facility]) E11.42, E11.65    Cardiomyopathy ([facility] I42.9    Mixed hyperlipidemia E78.2    Diabetic ulcer of left forefoot associated with type 2 diabetes mellitus, with necrosis of bone ([facility]) E11.621, L97.524    Cellulitis of left foot L03. 116    Allergic rhinitis J30.9    Osteoarthritis M19.90    Acute osteomyelitis of metatarsal bone of left foot ([facility]) M86.172    Surgical wound dehiscence, initial encounter T81.31XA    Compromised soft 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 toe amputation site T86.821    Chronic osteomyelitis of left foot ([facility] J60.018       In my clinical judgement, ongoing HBO therapy is necessary at this time, given a threat to patient function, limb or life from the current condition. Adjunctive Rx, objective weekly progress and goals of Rx will periodically be updated, on Mondays. Plan     1. Hyperbaric Oxygen - [place] Osei tolerated the treatment well today without complications. Continue HBO treatment as outlined above. 2. Other -     I was present on these premises and immediately available to furnish assistance & direction throughout the procedure.      -- Electronically signed by Krista Bernal MD on 11/10/2020 at 5:22 PM

## 2020-11-11 ENCOUNTER — HOSPITAL ENCOUNTER (OUTPATIENT)
Dept: HYPERBARIC MEDICINE | Age: 45
Discharge: HOME | End: 2020-11-11
Payer: COMMERCIAL

## 2020-11-11 VITALS
SYSTOLIC BLOOD PRESSURE: 129 MMHG | HEART RATE: 98 BPM | RESPIRATION RATE: 18 BRPM | TEMPERATURE: 99.6 F | DIASTOLIC BLOOD PRESSURE: 90 MMHG

## 2020-11-11 LAB
C-REACTIVE PROTEIN: 26.4 MG/L (ref 0–5.1)
CHOLESTEROL, TOTAL: 269 MG/DL (ref 0–199)
GLUCOSE BLD-MCNC: 193 MG/DL (ref 70–99)
GLUCOSE BLD-MCNC: 234 MG/DL (ref 70–99)
HDLC SERPL-MCNC: 25 MG/DL (ref 40–60)
LDL CHOLESTEROL CALCULATED: ABNORMAL MG/DL
LDL CHOLESTEROL DIRECT: 49 MG/DL
PERFORMED ON: ABNORMAL
PERFORMED ON: ABNORMAL
TRIGL SERPL-MCNC: 1114 MG/DL (ref 0–150)
VLDLC SERPL CALC-MCNC: ABNORMAL MG/DL

## 2020-11-11 PROCEDURE — G0277 HBOT, FULL BODY CHAMBER, 30M: HCPCS

## 2020-11-11 PROCEDURE — 99183 HYPERBARIC OXYGEN THERAPY: CPT | Performed by: EMERGENCY MEDICINE

## 2020-11-11 ASSESSMENT — PAIN DESCRIPTION - ORIENTATION
ORIENTATION: LEFT
ORIENTATION: LEFT

## 2020-11-11 ASSESSMENT — PAIN DESCRIPTION - LOCATION
LOCATION: FOOT
LOCATION: FOOT

## 2020-11-11 ASSESSMENT — PAIN SCALES - GENERAL
PAINLEVEL_OUTOF10: 1
PAINLEVEL_OUTOF10: 1

## 2020-11-11 ASSESSMENT — PAIN DESCRIPTION - DESCRIPTORS
DESCRIPTORS: DISCOMFORT
DESCRIPTORS: DISCOMFORT

## 2020-11-11 ASSESSMENT — PAIN DESCRIPTION - ONSET
ONSET: ON-GOING
ONSET: ON-GOING

## 2020-11-11 ASSESSMENT — PAIN - FUNCTIONAL ASSESSMENT
PAIN_FUNCTIONAL_ASSESSMENT: ACTIVITIES ARE NOT PREVENTED
PAIN_FUNCTIONAL_ASSESSMENT: ACTIVITIES ARE NOT PREVENTED

## 2020-11-11 ASSESSMENT — PAIN DESCRIPTION - PROGRESSION
CLINICAL_PROGRESSION: NOT CHANGED
CLINICAL_PROGRESSION: NOT CHANGED

## 2020-11-11 ASSESSMENT — PAIN DESCRIPTION - FREQUENCY
FREQUENCY: CONTINUOUS
FREQUENCY: CONTINUOUS

## 2020-11-11 ASSESSMENT — PAIN DESCRIPTION - DIRECTION
RADIATING_TOWARDS: DENIES
RADIATING_TOWARDS: DENIES

## 2020-11-11 ASSESSMENT — PAIN DESCRIPTION - PAIN TYPE
TYPE: CHRONIC PAIN
TYPE: CHRONIC PAIN

## 2020-11-11 NOTE — PLAN OF CARE
Patient seen for HBOT treatment  9 / 30    today. Patient assessment complete and cleared for HBOT. Patient was compressed in HBO chamber to 2.0 VINCENT at 1.5 psi/min. Patient is tolerating treatment well and is currently resting comfortably and watching television. HBO RN at chamber side, will continue to monitor.

## 2020-11-11 NOTE — PROGRESS NOTES
[address]  Hyperbaric Oxygen    Ashlee Eason     : 1975    DATE OF VISIT:  2020    Subjective     Ashlee Eason is a 39 y.o. male who  has a past medical history of Acute osteomyelitis of right hallux ([facility] (2019), CHF (congestive heart failure) ([facility] (2014), Clostridium difficile infection (2016), Diabetic ulcer of right great toe associated with type 2 diabetes mellitus, with necrosis of bone ([facility] (2019), Migraine, Possible perforated tympanic membrane, Recurrent otitis media, Tear of medial meniscus of left knee, Tobacco use, and Toe osteomyelitis, left ([facility] (2020). He presents to the [facility] today for hyperbaric oxygen treatment of his diabetic foot ulcer  , which is refractory to standard therapy for 30 days. Patient denies fever. Patient denies nausea, vomiting or diarrhea; no ear troubles and no other new complaints; no fears or anxiety regarding treatment today. Objective     Vitals:    20 1114   BP: (!) 140/87   Pulse: 112   Resp: 18   Temp: 97.8 °F (36.6 °C)   TempSrc: Oral       General:  Alert, cooperative, no distress. Ears: External otic canals are within acceptable limits. Teed grade 0 on the right and 0 on the left pre-treatment. Teed grade 1 on the right and 1 on the left post-treatment. Lungs:  Clear to auscultation bilaterally. Ulcer: Not examined today in HBO, if applicable. Recent Labs     20  1112 20  1312 11/10/20  1111 11/10/20  1309 20  1109   POCGLU 179* 222* 175* 221* 234*       Assessment     Ashlee Eason is a 39 y.o. male who presented to the [facility] today for hyperbaric oxygen treatment #9 of 30 for the diagnosis as stated above. Treatment given at 2 VINCENT for 90 minutes, with no air breaks.    today, including compression, 100% oxygen at pressure, air breaks if applicable, and decompression. Patient Active Problem List   Diagnosis Code    Hypertension I10    Uncontrolled type 2 diabetes mellitus with diabetic polyneuropathy ([facility]) E11.42, E11.65    Cardiomyopathy ([facility] I42.9    Mixed hyperlipidemia E78.2    Diabetic ulcer of left forefoot associated with type 2 diabetes mellitus, with necrosis of bone ([facility]) E11.621, L97.524    Cellulitis of left foot L03. 116    Allergic rhinitis J30.9    Osteoarthritis M19.90    Acute osteomyelitis of metatarsal bone of left foot ([facility]) M86.172    Surgical wound dehiscence, initial encounter T81.31XA    Compromised soft 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 toe amputation site T86.821    Chronic osteomyelitis of left foot ([facility] K50.965       In my clinical judgement, ongoing HBO therapy is necessary at this time, given a threat to patient function, limb or life from the current condition. Adjunctive Rx, objective weekly progress and goals of Rx will periodically be updated, on Mondays. Plan     1. Hyperbaric Oxygen - Garalnd Osei tolerated the treatment well today without complications. Continue HBO treatment as outlined above. 2. Other -     I was present on these premises and immediately available to furnish assistance & direction throughout the procedure.      -- Electronically signed by Sandeep Hoyt MD on 11/11/2020 at 1:14 PM

## 2020-11-12 ENCOUNTER — HOSPITAL ENCOUNTER (OUTPATIENT)
Dept: HYPERBARIC MEDICINE | Age: 45
Discharge: HOME | End: 2020-11-12
Payer: COMMERCIAL

## 2020-11-12 VITALS
TEMPERATURE: 99.6 F | HEART RATE: 80 BPM | SYSTOLIC BLOOD PRESSURE: 119 MMHG | RESPIRATION RATE: 18 BRPM | DIASTOLIC BLOOD PRESSURE: 80 MMHG

## 2020-11-12 PROBLEM — M79.672 ACUTE POSTOPERATIVE PAIN OF LEFT FOOT: Status: ACTIVE | Noted: 2020-11-12

## 2020-11-12 PROBLEM — G89.18 ACUTE POSTOPERATIVE PAIN OF LEFT FOOT: Status: ACTIVE | Noted: 2020-11-12

## 2020-11-12 PROBLEM — L76.31: Status: ACTIVE | Noted: 2020-11-12

## 2020-11-12 LAB
GLUCOSE BLD-MCNC: 245 MG/DL (ref 70–99)
GLUCOSE BLD-MCNC: 272 MG/DL (ref 70–99)
PERFORMED ON: ABNORMAL
PERFORMED ON: ABNORMAL

## 2020-11-12 PROCEDURE — G0277 HBOT, FULL BODY CHAMBER, 30M: HCPCS

## 2020-11-12 PROCEDURE — 99183 HYPERBARIC OXYGEN THERAPY: CPT | Performed by: EMERGENCY MEDICINE

## 2020-11-12 ASSESSMENT — PAIN SCALES - GENERAL
PAINLEVEL_OUTOF10: 0
PAINLEVEL_OUTOF10: 0

## 2020-11-12 NOTE — PLAN OF CARE
Patient seen for HBOT treatment  10 / 30    today. Patient assessment complete; ; pt ate granola bar for breakfast and had 24units humalog and 4mg Amaryl per pt; MD aware and cleared for HBOT. Patient was compressed in HBO chamber to 2.0 VINCENT at 1.5 psi/min. Patient is tolerating treatment well and is currently resting comfortably and watching television. HBO RN at chamber side, will continue to monitor.

## 2020-11-12 NOTE — PROGRESS NOTES
[address] [facility],[address]  Hyperbaric Oxygen Therapy   Progress Note      NAME: Laquita Oneill   MEDICAL RECORD NUMBER:  [id number]  AGE: 39 y.o. GENDER: male  : 1975  EPISODE DATE:  2020     Subjective     HBO Treatment Number: 10 out of Total Treatments: 30    HBO Diagnosis:     Indications: Lower Extremity Diabetic Wound ___(site)  Spring Lira Ohs 3 left foot    The patient presents to the [facility] today for hyperbaric oxygen treatment of his Reyna 3 DFU and failed soft tissue flap, the former of which is refractory to standard therapy for 30 days. Patient denies fever. Patient denies nausea, vomiting or diarrhea; no ear troubles and no other new complaints; no fears or anxiety regarding treatment today. Safety checks performed prior to treatment. See doc flowsheets for documentation. Objective           Recent Labs     20  0810 20  1012   POCGLU 195* 146*           Glucose Testing  Blood Glucose POCT: (!) 245     Pre treatment Vital Signs       Temp: 97.8 °F (36.6 °C)     Pulse: 76     Resp: 18   BP: (!) 119/90(manual BP)       Post treatment Vital Signs  Temp: 99.6 °F (37.6 °C)  Pulse: 80  Resp: 18  BP: 119/80(manual BP)     Physical Exam:  General Appearance:  alert and oriented to person, place and time, well-developed and well-nourished, in no acute distress    Pre Tympanic Membrane Assessment: normal on gross inspection. No pain complaints    Post Tympanic Membrane Assessment:      Pulmonary/Chest:  clear to auscultation bilaterally- no wheezes, rales or rhonchi, normal air movement, no respiratory distress    Cardiovascular:  normal, regular rate and rhythm        Assessment        Laquita Oneill is a 39 y.o. male who presented to the [facility] today for hyperbaric oxygen treatment #10 of 30 for the diagnosis as stated above. Treatment given at 2 VINCENT for 90 minutes, with no air breaks.  Total Treatment

## 2020-11-12 NOTE — PROGRESS NOTES
[address] [facility] Progress Note    Ashlee Eason     : 1975    DATE OF VISIT:  2020    Subjective:     Ashlee Eason is a 39 y.o. male who has a diabetic and  dehisced surgical ulcer located on the foot (left , forefoot). Significant symptoms or pertinent wound history since last visit: after Dr. Ketty Florence and I saw him on Monday, he had a bit of increased pain Mon-Tuesday, then felt better into Wednesday. Yesterday during his dressing change there was a bit more distal redness and swelling noted, but he felt ok, but then last night pain became more severe -- \"felt like a truck was parked on my foot\", describes a sense of pressure and tightness, not classically neuropathic pain. No F/C/D, no pus or malodor, no increased bleeding. Shaneka Madrigal was not helping his pain at all. Was unable to sleep last night because of pain, lost his appetite, so did not eat breakfast today (but still took his Humalog), so was unable to dive today. Because of the severity and suddenness of his symptoms, I felt that I had to re-assess him here today. Additional ulcer(s) noted? no.      His current medication list consists of Aspirin-Acetaminophen-Caffeine, Insulin Glargine (1 Unit Dial), PARoxetine, ammonium lactate, carvedilol, daptomycin, fluticasone, furosemide, gabapentin, glimepiride, insulin lispro, insulin lispro (1 Unit Dial), loratadine, losartan, meropenem, hydrocodone-acetaminophen, oxymetazoline, sildenafil, simvastatin, tiZANidine, and traZODone. Allergies: Januvia [sitagliptin];  Metformin and related; Vancomycin; and Mustard oil [allyl isothiocyanate]    Objective:     Vitals:    20 1146   BP: (!) 160/100   Pulse: 94   Resp: 18   Temp: 97.8 °F (36.6 °C)   TempSrc: Oral     AAOx3, overweight, fatigued, does look in some discomfort, but NAD otherwise  Intact distal pulses, foot warm, good cap refill  Diffuse mild-moderate pedal edema, but a more focal area of erythema and swelling dorsally, over the proximal 2nd met, approximately  No actual allodynia  Hard to know if there is still active cellulitis, no angitis  Shanita-ulcer skin: indurated, pink, erythematous , warm and mild maceration. Ulcer(s): dehisced 1st ray wound with some granulation but some SQ fibronecrosis, fibrin, biofilm, a couple of proximal sutures still present but within necrotic tissue now; 2nd toe amp site necrotic, again sutures in place but I think not working as well as they had been, some sense of deep probing there, along the 2nd met; no pus or malodor. Photos also saved in electronic chart. Today's wound measurements, per RN documentation:  Wound 11/02/20 #6, left second toe, diabetic 2,  full thickness, onset 11/2/2020-Wound Length (cm): 2.5 cm  Wound 07/31/20 Proximal #1, Left Great toe amp site, DFU, Reyna 4, onset 7/24/20-Wound Length (cm): 5 cm    Wound 11/02/20 #6, left second toe, diabetic 2,  full thickness, onset 11/2/2020-Wound Width (cm): 1.5 cm  Wound 07/31/20 Proximal #1, Left Great toe amp site, DFU, Reyna 4, onset 7/24/20-Wound Width (cm): 1.4 cm    Wound 11/02/20 #6, left second toe, diabetic 2,  full thickness, onset 11/2/2020-Wound Depth (cm): 0.6 cm  Wound 07/31/20 Proximal #1, Left Great toe amp site, DFU, Reyna 4, onset 7/24/20-Wound Depth (cm): 3.3 cm    Assessment:     Patient Active Problem List   Diagnosis Code    Hypertension I10    Uncontrolled type 2 diabetes mellitus with diabetic polyneuropathy ([facility]) E11.42, E11.65    Cardiomyopathy ([facility] I42.9    Mixed hyperlipidemia E78.2    Diabetic ulcer of left forefoot associated with type 2 diabetes mellitus, with necrosis of bone ([facility]) E11.621, L97.524    Cellulitis of left foot L03. 116    Allergic rhinitis J30.9    Osteoarthritis M19.90    Acute osteomyelitis of metatarsal bone of left foot ([facility]) M86.172    Surgical wound dehiscence, initial encounter T81.31XA    Compromised soft 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 toe amputation site T86.821    Chronic osteomyelitis of left foot ([facility]) M86.672    Post-op hematoma of left foot L76.31    Acute postoperative pain of left foot M79.672, G89.18       Assessment of today's active condition(s): uncontrolled but improving DM2, neuropathy, no large-vessel PAD; Sudheer Brash 4 diabetic foot initially, with sub-1st-met head callus and ulcer, then rapid soft tissue infection, hallux gangrene, open hallux amp, slow wound healing, then recurrent infection in 1st and 2nd rays -- recent 1st met debridement and wound dehiscence, plus 2nd toe amp with flap compromise and now franke dehiscence. Increased pain, redness and swelling in the forefoot today, not certain if hematoma or abscess, but I think I favor the former, given the timing, the fact that he's been compliant with Abx, etc. Taking more steps than I think he should, and just in a post-op shoe. Factors contributing to occurrence and/or persistence of the chronic ulcer include edema, diabetes, poor glucose control, shear force, obesity, decreased tissue oxygenation and non-adherence. Medical necessity of today's visit is shown by the above documentation. Sharp debridement is indicated today, based upon the exam findings in the wound(s) above. Procedure note:     Consent obtained. Time out performed per [facility] policy. Anesthetic  Anesthetic: 4% Lidocaine Cream     Using a curette, #15 blade scalpel and forceps, I sharply debrided the foot (left , forefoot) ulcer(s) down through and including the removal of subcutaneous tissue. The type(s) of tissue debrided included fibrin, biofilm, slough, necrotic/eschar, clots and all remaining sutures. Total Surface Area Debrided: 8 sq cm. No purulence encountered; not a large amount of hematoma expressed, but a bit, mostly from the 2nd ray, and that focal area of swelling did look and feel a bit more decompressed after the procedure. The ulcers were then irrigated with normal saline solution.  The procedure was completed with a moderate amount of bleeding, and hemostasis was with pressure. The patient tolerated the procedure well, with no significant complications. The patient's level of pain during and after the procedure was monitored. Post-debridement measurements, if different from pre-debridement, are in the flowsheet as well. Discharge plan:     Treatment in the wound care center today, per RN documentation: Wound 11/02/20 #6, left second toe, diabetic 2,  full thickness, onset 11/2/2020-Dressing/Treatment: Other (comment)(vashe,betadine,triad,opticell ag rope,ABD,football dressing )  Wound 07/31/20 Proximal #1, Left Great toe amp site, DFU, Reyna 4, onset 7/24/20-Dressing/Treatment: Other (comment)(vashe,betadine,triad,opticell ag rope,ABD,football dressing ). Keep dressing in place until Monday. No change in Abx. Have something to eat, since he already took his Humalog. Reviewed his OARRS report today, called in Percocet in lieu of his Norco this week, given severe pain. Discussed possible short- and long-term side effects. Keep steps on the left foot to a minimum (crutches, wheelchair, rolling knee walker, etc). Recommended that he order an OrthoWedge shoe, to force him to bear weight on his heel when he does have to take steps. Also elevate the leg periodically to help with swelling. Could use an ice pack to help with pain and swelling, but I don't think it will do much through the football dressing that we placed. Written discharge instructions given to patient. Follow up in 3 days, to see Dr. Lucien Montoya and me, and for HBOT. D/W Dr. Lucien Montoya.      Electronically signed by Miguelito Tillman MD on 11/12/2020 at 11:04 AM.

## 2020-11-13 ENCOUNTER — HOSPITAL ENCOUNTER (OUTPATIENT)
Dept: HYPERBARIC MEDICINE | Age: 45
Discharge: HOME | End: 2020-11-13
Payer: COMMERCIAL

## 2020-11-13 VITALS
SYSTOLIC BLOOD PRESSURE: 128 MMHG | DIASTOLIC BLOOD PRESSURE: 84 MMHG | HEART RATE: 96 BPM | RESPIRATION RATE: 18 BRPM | TEMPERATURE: 99.4 F

## 2020-11-13 LAB
GLUCOSE BLD-MCNC: 247 MG/DL (ref 70–99)
GLUCOSE BLD-MCNC: 288 MG/DL (ref 70–99)
PERFORMED ON: ABNORMAL
PERFORMED ON: ABNORMAL

## 2020-11-13 PROCEDURE — G0277 HBOT, FULL BODY CHAMBER, 30M: HCPCS

## 2020-11-13 PROCEDURE — 99183 HYPERBARIC OXYGEN THERAPY: CPT | Performed by: INTERNAL MEDICINE

## 2020-11-13 ASSESSMENT — PAIN DESCRIPTION - FREQUENCY: FREQUENCY: INTERMITTENT

## 2020-11-13 ASSESSMENT — PAIN DESCRIPTION - ONSET: ONSET: ON-GOING

## 2020-11-13 ASSESSMENT — PAIN DESCRIPTION - LOCATION: LOCATION: FOOT

## 2020-11-13 ASSESSMENT — PAIN SCALES - GENERAL
PAINLEVEL_OUTOF10: 1
PAINLEVEL_OUTOF10: 0

## 2020-11-13 ASSESSMENT — PAIN DESCRIPTION - PAIN TYPE: TYPE: CHRONIC PAIN

## 2020-11-13 ASSESSMENT — PAIN DESCRIPTION - DESCRIPTORS: DESCRIPTORS: DISCOMFORT

## 2020-11-13 ASSESSMENT — PAIN - FUNCTIONAL ASSESSMENT: PAIN_FUNCTIONAL_ASSESSMENT: ACTIVITIES ARE NOT PREVENTED

## 2020-11-13 ASSESSMENT — PAIN DESCRIPTION - ORIENTATION: ORIENTATION: LEFT

## 2020-11-13 ASSESSMENT — PAIN DESCRIPTION - DIRECTION: RADIATING_TOWARDS: DENIES

## 2020-11-13 ASSESSMENT — PAIN DESCRIPTION - PROGRESSION: CLINICAL_PROGRESSION: NOT CHANGED

## 2020-11-13 NOTE — PLAN OF CARE
Patient seen for HBOT treatment  11 / 30    today. Patient assessment complete; , pt ate \"couple pieces of ham for breakfast\" and took 25units of insulin around 0930 . MD aware and cleared for HBOT. Patient was compressed in HBO chamber to 2.0 VINCENT at 1.5 psi/min. Patient is tolerating treatment well and is currently resting comfortably and watching television. HBO RN at chamber side, will continue to monitor.

## 2020-11-15 NOTE — PROGRESS NOTES
including compression, 100% oxygen at pressure, air breaks if applicable, and decompression. Patient Active Problem List   Diagnosis Code    Hypertension I10    Uncontrolled type 2 diabetes mellitus with diabetic polyneuropathy ([facility]) E11.42, E11.65    Cardiomy[facility] I42.9    Mixed hyperlipidemia E78.2    Diabetic ulcer of left forefoot associated with type 2 diabetes mellitus, with necrosis of bone ([facility]) E11.621, L97.524    Cellulitis of left foot L03. 116    Allergic rhinitis J30.9    Osteoarthritis M19.90    Acute osteomyelitis of metatarsal bone of left foot ([facility]) M86.172    Surgical wound dehiscence, initial encounter T81.31XA    Failed soft 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 toe amputation site T86.821    Chronic osteomyelitis of left foot ([facility]) Z14.768    Post-op hematoma of left foot L76.31    Acute postoperative pain of left foot M79.672, G89.18       In my clinical judgement, ongoing HBO therapy is necessary at this time, given a threat to patient function, limb or life from the current condition. Adjunctive Rx, objective weekly progress and goals of Rx will periodically be updated, on Mondays. Plan     1. Hyperbaric Oxygen - Unknown Jordin Osei tolerated the treatment well today without complications. Continue HBO treatment as outlined above. 2. Other -     I was present on these premises and immediately available to furnish assistance & direction throughout the procedure.      -- Electronically signed by Emy Hoyt MD on 11/15/2020 at 4:56 PM

## 2020-11-16 ENCOUNTER — HOSPITAL ENCOUNTER (OUTPATIENT)
Dept: WOUND CARE | Age: 45
Discharge: HOME | End: 2020-11-16
Payer: COMMERCIAL

## 2020-11-16 ENCOUNTER — HOSPITAL ENCOUNTER (OUTPATIENT)
Dept: HYPERBARIC MEDICINE | Age: 45
Discharge: HOME | End: 2020-11-16
Payer: COMMERCIAL

## 2020-11-16 ENCOUNTER — HOSPITAL ENCOUNTER (OUTPATIENT)
Age: 45
Setting detail: SPECIMEN
Discharge: HOME | End: 2020-11-16
Payer: COMMERCIAL

## 2020-11-16 VITALS
DIASTOLIC BLOOD PRESSURE: 96 MMHG | TEMPERATURE: 98.5 F | HEART RATE: 88 BPM | SYSTOLIC BLOOD PRESSURE: 137 MMHG | RESPIRATION RATE: 16 BRPM

## 2020-11-16 VITALS
HEART RATE: 86 BPM | SYSTOLIC BLOOD PRESSURE: 137 MMHG | DIASTOLIC BLOOD PRESSURE: 96 MMHG | TEMPERATURE: 98.5 F | RESPIRATION RATE: 18 BRPM

## 2020-11-16 LAB
A/G RATIO: 2 (ref 1.1–2.2)
ALBUMIN SERPL-MCNC: 4.2 G/DL (ref 3.4–5)
ALP BLD-CCNC: 148 U/L (ref 40–129)
ALT SERPL-CCNC: 24 U/L (ref 10–40)
ANION GAP SERPL CALCULATED.3IONS-SCNC: 13 MMOL/L (ref 3–16)
AST SERPL-CCNC: 15 U/L (ref 15–37)
BASOPHILS ABSOLUTE: 0.1 K/UL (ref 0–0.2)
BASOPHILS RELATIVE PERCENT: 1.2 %
BILIRUB SERPL-MCNC: 1.9 MG/DL (ref 0–1)
BUN BLDV-MCNC: 18 MG/DL (ref 7–20)
CALCIUM SERPL-MCNC: 9.2 MG/DL (ref 8.3–10.6)
CHLORIDE BLD-SCNC: 98 MMOL/L (ref 99–110)
CO2: 26 MMOL/L (ref 21–32)
CREAT SERPL-MCNC: 0.6 MG/DL (ref 0.9–1.3)
EOSINOPHILS ABSOLUTE: 0.3 K/UL (ref 0–0.6)
EOSINOPHILS RELATIVE PERCENT: 4.1 %
GFR AFRICAN AMERICAN: >60
GFR NON-AFRICAN AMERICAN: >60
GLOBULIN: 2.1 G/DL
GLUCOSE BLD-MCNC: 220 MG/DL (ref 70–99)
GLUCOSE BLD-MCNC: 251 MG/DL (ref 70–99)
GLUCOSE BLD-MCNC: 310 MG/DL (ref 70–99)
HCT VFR BLD CALC: 38.3 % (ref 40.5–52.5)
HEMOGLOBIN: 13.4 G/DL (ref 13.5–17.5)
LYMPHOCYTES ABSOLUTE: 2.6 K/UL (ref 1–5.1)
LYMPHOCYTES RELATIVE PERCENT: 34.9 %
MCH RBC QN AUTO: 31.4 PG (ref 26–34)
MCHC RBC AUTO-ENTMCNC: 35.1 G/DL (ref 31–36)
MCV RBC AUTO: 89.6 FL (ref 80–100)
MONOCYTES ABSOLUTE: 0.6 K/UL (ref 0–1.3)
MONOCYTES RELATIVE PERCENT: 7.7 %
NEUTROPHILS ABSOLUTE: 3.8 K/UL (ref 1.7–7.7)
NEUTROPHILS RELATIVE PERCENT: 52.1 %
PDW BLD-RTO: 13.3 % (ref 12.4–15.4)
PERFORMED ON: ABNORMAL
PERFORMED ON: ABNORMAL
PLATELET # BLD: 216 K/UL (ref 135–450)
PMV BLD AUTO: 8 FL (ref 5–10.5)
POTASSIUM SERPL-SCNC: 4.3 MMOL/L (ref 3.5–5.1)
RBC # BLD: 4.28 M/UL (ref 4.2–5.9)
SEDIMENTATION RATE, ERYTHROCYTE: 37 MM/HR (ref 0–15)
SODIUM BLD-SCNC: 137 MMOL/L (ref 136–145)
TOTAL CK: 42 U/L (ref 39–308)
TOTAL PROTEIN: 6.3 G/DL (ref 6.4–8.2)
WBC # BLD: 7.4 K/UL (ref 4–11)

## 2020-11-16 PROCEDURE — 86140 C-REACTIVE PROTEIN: CPT

## 2020-11-16 PROCEDURE — 99183 HYPERBARIC OXYGEN THERAPY: CPT | Performed by: INTERNAL MEDICINE

## 2020-11-16 PROCEDURE — 80053 COMPREHEN METABOLIC PANEL: CPT

## 2020-11-16 PROCEDURE — 99213 OFFICE O/P EST LOW 20 MIN: CPT | Performed by: INTERNAL MEDICINE

## 2020-11-16 PROCEDURE — 36415 COLL VENOUS BLD VENIPUNCTURE: CPT

## 2020-11-16 PROCEDURE — 82550 ASSAY OF CK (CPK): CPT

## 2020-11-16 PROCEDURE — 85025 COMPLETE CBC W/AUTO DIFF WBC: CPT

## 2020-11-16 PROCEDURE — G0277 HBOT, FULL BODY CHAMBER, 30M: HCPCS

## 2020-11-16 PROCEDURE — 11042 DBRDMT SUBQ TIS 1ST 20SQCM/<: CPT

## 2020-11-16 PROCEDURE — 85652 RBC SED RATE AUTOMATED: CPT

## 2020-11-16 RX ORDER — LIDOCAINE HYDROCHLORIDE 40 MG/ML
SOLUTION TOPICAL ONCE
Status: DISCONTINUED | OUTPATIENT
Start: 2020-11-16 | End: 2020-11-17 | Stop reason: HOSPADM

## 2020-11-16 ASSESSMENT — PAIN SCALES - GENERAL
PAINLEVEL_OUTOF10: 0

## 2020-11-16 ASSESSMENT — PAIN - FUNCTIONAL ASSESSMENT: PAIN_FUNCTIONAL_ASSESSMENT: PREVENTS OR INTERFERES SOME ACTIVE ACTIVITIES AND ADLS

## 2020-11-16 NOTE — DISCHARGE INSTR - COC
Continuity of Care Form    Patient Name: Mike Siddiqui   :  1975  MRN:  [id number]    Admit date:  2020  Discharge date:  ***    Code Status Order: Prior   Advance Directives:   [facility] Documentation     Date/Time Healthcare Directive Type of Healthcare Directive Copy in [facility] [address] Agent's Name Healthcare Agent's Phone Number    20 1113  Yes, patient has an advance directive for healthcare treatment  Durable power of  for health care  Yes, copy in chart  [address]  ...56          Admitting Physician:  No admitting provider for patient encounter. PCP: Kena Ambriz MD    Discharging Nurse: [facility] Unit/Room#: No information available for this encounter. Discharging Unit Phone Number: ***    Emergency Contact:   Extended Emergency Contact Information  Primary Emergency Contact: Juarez Osei  Address: [address] [place] [address] Phone: .[phone]  Relation: Spouse    Past Surgical History:  Past Surgical History:   Procedure Laterality Date    KNEE ARTHROSCOPY Left [id number]    LEFT KNEE ARTHROSCOPY , SYNOVECTOMY       OTHER SURGICAL HISTORY Left 2020    PARTIAL LEFT FOOT AMPUTATION    TOE AMPUTATION Right 2019    PARTIAL RIGHT FOOT AMPUTATION performed by Bacilio Walker DPM at [facility] Left 2020    PARTIAL LEFT FOOT AMPUTATION performed by Bacilio Walker DPM at [facility] TOE AMPUTATION Left 10/22/2020    PARTIAL LEFT FOOT AMPUTATION WITH GRAFT APPLICATION performed by Bacilio Walker DPM at [address] [place] [address] EXTRACTION         Immunization History: There is no immunization history on file for this patient.     Active Problems:  Patient Active Problem List   Diagnosis Code    Hypertension I10    Uncontrolled type 2 diabetes mellitus with diabetic polyneuropathy ([facility] E11.42, E11.65    Cardiomyopathy ([facility] I42.9    Mixed hyperlipidemia E78.2    Diabetic ulcer of left forefoot associated with type 2 diabetes mellitus, with necrosis of bone ([facility]) E11.621, L97.524    Cellulitis of left foot L03. 116    Allergic rhinitis J30.9    Osteoarthritis M19.90    Acute osteomyelitis of metatarsal bone of left foot ([facility]) M86.172    Surgical wound dehiscence, initial encounter T81.31XA    Failed soft 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 toe amputation site T86.821    Chronic osteomyelitis of left foot ([facility]) M86.672    Post-op hematoma of left foot L76.31    Acute postoperative pain of left foot M79.672, G89.18       Isolation/Infection:   Isolation          No Isolation        Patient Infection Status     Infection Onset Added Last Indicated Last Indicated By Review Planned Expiration Resolved Resolved By    None active    Resolved    COVID-19 Rule Out 10/20/20 10/20/20 10/20/20 COVID-19 (Ordered)   10/20/20 Rule-Out Test Resulted          Nurse Assessment:  Last Vital Signs: BP (!) 137/96 Comment: MD aware, asymptomatic  Pulse 88   Temp 98.5 °F (36.9 °C) (Oral)   Resp 16     Last documented pain score (0-10 scale): Pain Level: 0  Last Weight:   Wt Readings from Last 1 Encounters:   11/10/20 262 lb (118.8 kg)     Mental Status:  {IP PT MENTAL STATUS:41557}    IV Access:  { DELIO IV ACCESS:650451491}    Nursing Mobility/ADLs:  Walking   {[facility] DME MMBA:653774930}  Transfer  {[facility] DME PQIZ:056311419}  Bathing  {[facility] DME FINT:793054655}  Dressing  {[facility] DME HUIR:116606688}  Toileting  {[facility] DME WWML:259763417}  Feeding  {[facility] DME YPZN:068479265}  Med Admin  {[facility] DME JEIA:145360894}  Med Delivery   { DELIO MED Delivery:542069619}    Wound Care Documentation and Therapy:  Wound 07/31/20 Proximal #1, Left Great toe amp site, DFU, Reyna 4, onset 7/24/20 (Active)   Wound Image   11/09/20 1325   Wound Etiology Diabetic Reyna 4 11/16/20 1322   Dressing Status New dressing applied;Clean;Dry; Intact 11/12/20 1155   Wound Cleansed Cleansed with saline; Soap and water 11/16/20 1322   Dressing/Treatment Other (comment) 11/12/20 1155   Offloading for Diabetic Foot Ulcers Football dressing 11/06/20 1211   Wound Length (cm) 3.6 cm 11/16/20 1322   Wound Width (cm) 1 cm 11/16/20 1322   Wound Depth (cm) 1.7 cm 11/16/20 1322   Wound Surface Area (cm^2) 3.6 cm^2 11/16/20 1322   Change in Wound Size % (l*w) 85.48 11/16/20 1322   Wound Volume (cm^3) 6.12 cm^3 11/16/20 1322   Wound Healing % 82 11/16/20 1322   Post-Procedure Length (cm) 5 cm 11/06/20 1203   Post-Procedure Width (cm) 1.4 cm 11/06/20 1203   Post-Procedure Depth (cm) 3.3 cm 11/06/20 1203   Post-Procedure Surface Area (cm^2) 7 cm^2 11/06/20 1203   Post-Procedure Volume (cm^3) 23.1 cm^3 11/06/20 1203   Distance Tunneling (cm) 0 cm 10/19/20 0820   Tunneling Position ___ O'Clock 0 10/19/20 0820   Undermining Starts ___ O'Clock 6 10/19/20 0820   Undermining Ends___ O'Clock 9 10/19/20 0820   Undermining Maxium Distance (cm) .6 10/19/20 0820   Wound Assessment Granulation tissue;Slough 11/16/20 1322   Drainage Amount Moderate 11/16/20 1322   Drainage Description Serosanguinous 11/16/20 1322   Odor None 11/16/20 1322   Shanita-wound Assessment Fragile 11/16/20 1322   Margins Attached edges 11/16/20 1322   Number of days: 107       Wound 11/02/20 #6, left second toe, diabetic 2,  full thickness, onset 11/2/2020 (Active)   Wound Image   11/09/20 1325   Wound Etiology Diabetic Reyna 2 11/16/20 1322   Dressing Status New dressing applied;Clean;Dry; Intact 11/12/20 1155   Wound Cleansed Irrigated with saline 11/12/20 1155   Dressing/Treatment Other (comment) 11/12/20 1155   Offloading for Diabetic Foot Ulcers Post op shoe 11/09/20 1417   Wound Length (cm) 1 cm 11/16/20 1322   Wound Width (cm) 0.7 cm 11/16/20 1322   Wound Depth (cm) 0.3 cm 11/16/20 1322   Wound Surface Area (cm^2) 0.7 cm^2 11/16/20 1322   Change in Wound Size % (l*w) 78.79 11/16/20 1322   Wound Volume (cm^3) 0.21 cm^3 11/16/20 1322 Status Date: ***    Readmission Risk Assessment Score:  Readmission Risk              Risk of Unplanned Readmission:        0           Discharging to Facility/ Agency   · Name:   · Address:  · Phone:  · Fax:    Dialysis Facility (if applicable)   · Name:  · Address:  · Dialysis Schedule:  · Phone:  · Fax:    / signature: {Esignature:[id number]}    PHYSICIAN SECTION    Prognosis: {Prognosis:2513783862}    Condition at Discharge: [address] [place] [address] Patient Condition:095001522}    Rehab Potential (if transferring to Rehab): {Prognosis:0312015426}    Recommended Labs or Other Treatments After Discharge: ***    Physician Certification: I certify the above information and transfer of Tiffanie Proctor  is necessary for the continuing treatment of the diagnosis listed and that he requires {Admit to Appropriate Level of Care:84702} for {GREATER/LESS:802036837} 30 days.      Update Admission H&P: {CHP DME Changes in EBJCF:195720820}    PHYSICIAN SIGNATURE:  {Esignature:[id number]}

## 2020-11-17 ENCOUNTER — HOSPITAL ENCOUNTER (OUTPATIENT)
Dept: HYPERBARIC MEDICINE | Age: 45
Discharge: HOME | End: 2020-11-17
Payer: COMMERCIAL

## 2020-11-17 VITALS
HEART RATE: 94 BPM | TEMPERATURE: 98.1 F | DIASTOLIC BLOOD PRESSURE: 98 MMHG | RESPIRATION RATE: 18 BRPM | SYSTOLIC BLOOD PRESSURE: 147 MMHG

## 2020-11-17 PROBLEM — G89.18 ACUTE POSTOPERATIVE PAIN OF LEFT FOOT: Status: RESOLVED | Noted: 2020-11-12 | Resolved: 2020-11-17

## 2020-11-17 PROBLEM — M79.672 ACUTE POSTOPERATIVE PAIN OF LEFT FOOT: Status: RESOLVED | Noted: 2020-11-12 | Resolved: 2020-11-17

## 2020-11-17 PROBLEM — L76.31: Status: RESOLVED | Noted: 2020-11-12 | Resolved: 2020-11-17

## 2020-11-17 PROBLEM — L03.116 CELLULITIS OF LEFT FOOT: Status: RESOLVED | Noted: 2020-07-26 | Resolved: 2020-11-17

## 2020-11-17 LAB
C-REACTIVE PROTEIN: 8.1 MG/L (ref 0–5.1)
GLUCOSE BLD-MCNC: 224 MG/DL (ref 70–99)
GLUCOSE BLD-MCNC: 275 MG/DL (ref 70–99)
PERFORMED ON: ABNORMAL
PERFORMED ON: ABNORMAL

## 2020-11-17 PROCEDURE — G0277 HBOT, FULL BODY CHAMBER, 30M: HCPCS

## 2020-11-17 PROCEDURE — 99183 HYPERBARIC OXYGEN THERAPY: CPT | Performed by: INTERNAL MEDICINE

## 2020-11-17 ASSESSMENT — PAIN SCALES - GENERAL
PAINLEVEL_OUTOF10: 0
PAINLEVEL_OUTOF10: 0

## 2020-11-17 NOTE — PLAN OF CARE
Pt to the [facility] for follow up appointment. Wounds measuring smaller and were debrided today per Dr Shelly Herzog. Pt to continue with biweekly dressing. Pt to continue with IV antibiotics and HBOT. Pt to follow up in the [facility] with Dr Shelly Herzog in 1 week and for dressing change on Thursday after HBOT. Discharge instructions reviewed with patient, all questions answered, copy given to patient. Dressings were applied to all wounds per M.D. Instructions at this visit.

## 2020-11-17 NOTE — PLAN OF CARE
Patient to [facility] for HBO treatment. Patient VS per doc flow sheet.  on arrival. Patient reports eating cereal for breakfast and taking 15 units insulin. Patient educated importance of proper diet and wound healing. Patient resting quietly in chamber. RN at chamber side.

## 2020-11-17 NOTE — PROGRESS NOTES
[facility] (ID) Progress Note    Dreama Duverney     : 1975    DATE OF VISIT:  2020    Subjective:     Dreama Duverney is a 39 y.o. male who has a diabetic and  dehisced surgical ulcer located on the foot (left , forefoot). Current complaint of pain in this ulcer? yes. Quality of pain: aching, burning and throbbing  Timing: intermittent and decreasing in frequency  Severity: mild-moderate  Associated Signs/Symptoms: swelling, redness, increased warmth, drainage (moderate, serosanguinous), numbness and tingling  Other significant symptoms or pertinent ulcer history: feeling better in terms of pain. No F/C/D. Tolerating HBO well apart from some asymptomatic middle ear barotrauma. Tolerating Abx well, no PICC discomfort, no drug rash or pruritus, no N/V/D, no sore throat or mouth. Glucoses still rather labile. Purchased the OrthoWedge shoe as I recommended, but he's not wearing it -- states that it feels awkward because of the way it does not let him naturally step down with any weight on the front of his foot, but that's the entire point, which we discussed before. He's back to just a flat post-op shoe, not using a walker or crutches etc.     Additional ulcer(s) noted? no.      Mr. 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 He has a past surgical history that includes Tonsillectomy; [place] tooth extraction; Knee arthroscopy (Left, [id number]); Toe amputation (Right, 2019); other surgical history (Left, 2020);  Toe amputation (Left, 7/24/2020); and Toe amputation (Left, 10/22/2020). His family history includes Diabetes in his father and mother; High Blood Pressure in his father, maternal grandmother, maternal uncle, mother, and sister; High Cholesterol in his father, maternal uncle, and mother; Stroke in his father. Mr. Medardo Jones reports that he quit smoking about 15 years ago. He has a 1.00 pack-year smoking history. He quit smokeless tobacco use about 15 years ago. He reports current alcohol use. He reports that he does not use drugs. His current medication list consists of Aspirin-Acetaminophen-Caffeine, Insulin Glargine (1 Unit Dial), PARoxetine, ammonium lactate, carvedilol, daptomycin, fluticasone, furosemide, gabapentin, glimepiride, insulin lispro, insulin lispro (1 Unit Dial), loratadine, losartan, meropenem, oxymetazoline, sildenafil, simvastatin, tiZANidine, and traZODone. He's had almost 3 weeks of IV Abx at this point. Allergies: Januvia [sitagliptin]; Metformin and related; Vancomycin; and Mustard oil [allyl isothiocyanate]    Pertinent items from the review of systems are discussed in the HPI; the remainder of the ROS was reviewed and is negative.      Objective:     Vitals:    11/16/20 1316   BP: (!) 137/96   Pulse: 86   Resp: 18   Temp: 98.5 °F (36.9 °C)   TempSrc: Oral       Constitutional:  well-developed, well-nourished, NAD  Psychiatric:  oriented to person, place and time; mood and affect appropriate for the situation   Eyes:  pupils equal, round and reactive to light; sclerae anicteric, conjunctivae not pale  ENT: no thrush or oral ulcers, mucous membranes moist  Abd: soft, NT, ND, good BS  Cardiovascular:  bilateral pedal pulses palpable; mild lower extremity edema; PICC site benign, not inflamed, no palpable cord  Lymphatic:  no inguinal or popliteal adenopathy, no angitis, fading and receding cellulitis  Musculoskeletal:  no clubbing, cyanosis or petechiae; RLE and LLE with no gross effusions, joint misalignment or acute arthritis  Shanita-ulcer skin: indurated, pink, juhi, warm and dry. Ulcer(s): both wounds look to be granulating better, with less depth and volume, no pus, no malodor, just some superficial necrotic debris and biofilm; I did not probe aggressively to see if I can still reach bone. Photos also saved in electronic chart. Today's Wound Measurements, per RN documentation:  Wound 07/31/20 Proximal #1, Left Great toe amp site, DFU, Reyna 4, onset 7/24/20-Wound Length (cm): 3.6 cm  Wound 11/02/20 #6, left second toe, diabetic 2,  full thickness, onset 11/2/2020-Wound Length (cm): 1 cm    Wound 07/31/20 Proximal #1, Left Great toe amp site, DFU, Reyna 4, onset 7/24/20-Wound Width (cm): 1 cm  Wound 11/02/20 #6, left second toe, diabetic 2,  full thickness, onset 11/2/2020-Wound Width (cm): 0.7 cm    Wound 07/31/20 Proximal #1, Left Great toe amp site, DFU, Reyna 4, onset 7/24/20-Wound Depth (cm): 1.7 cm  Wound 11/02/20 #6, left second toe, diabetic 2,  full thickness, onset 11/2/2020-Wound Depth (cm): 0.3 cm  ______________________________    Lab Results   Component Value Date    LABALBU 4.2 11/16/2020     Lab Results   Component Value Date    CREATININE 0.6 (L) 11/16/2020     Lab Results   Component Value Date    HGB 13.4 (L) 11/16/2020     Lab Results   Component Value Date    LABA1C 7.8 10/26/2020       Recent lab trends (with pre-op Augmentin, OR on Oct 22, then IV Abx starting on about Oct 27) --     Oct 26 --         Creat 0.7, CK 26, cRP 107.4, alb 3.9, alk phos 169, WBC 11.2, Hgb 13.1, plts 302k, 200 Eos, .     Nov 2 --           Creat < 0.5, CK 60, cRP 32.4, alb 4.1, alk phos 185, WBC 14.7, Hgb 13.0, plts 392k, 300 Eos, ESR 79.      Nov 9 --           Creat 0.6, CK 48, cRP 26.4, alb 4.2, alk phos 159, WBC 9.4, Hgb 13.3, plts 240k, 200 Eos, ESR 57. Nov 16 -- Creat 0.6, CK 42, cRP 8.1, alb 4.2, alk phos 148, WBC 7.4, Hgb 13.4, plts 216k, 300 Eos, ESR 37.      Assessment: Patient Active Problem List   Diagnosis Code    Hypertension I10    Uncontrolled type 2 diabetes mellitus with diabetic polyneuropathy ([facility] E11.42, E11.65    Cardiomyopathy ([facility] I42.9    Mixed hyperlipidemia E78.2    Diabetic ulcer of left forefoot associated with type 2 diabetes mellitus, with necrosis of bone ([facility]) E11.621, L97.524    Allergic rhinitis J30.9    Osteoarthritis M19.90    Acute osteomyelitis of metatarsal bone of left foot ([facility]) M86.172    Surgical wound dehiscence, initial encounter T81.31XA    Failed soft 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 toe amputation site T86.821    Chronic osteomyelitis of left foot ([facility]) E90.839       Assessment of today's active condition(s): uncontrolled Dm2, neuropathy, Reyna 4 diabetic foot, recent 1st met head resection and 2nd toe amp for ne wsoft tissue infection and acute/chronic osteo. Rapid flap failure and surgical wound dehiscence; on Abx for presumed residual osteo, tolerating them very well and with a good trend in lab markers and his foot exam in the last week especially. Generally doing ok with HBO, but needs to work more on clearing his ears. Not offloading as well as I would like. Factors contributing to occurrence and/or persistence of the chronic ulcer include diabetes, poor glucose control, shear force, obesity and non-adherence. Medical necessity of today's visit is shown by the above documentation. Sharp debridement per Dr. Daphnie Vasquez. Discharge plan:     Treatment in the wound care center today, per RN documentation: Wound 07/31/20 Proximal #1, Left Great toe amp site, DFU, Reyna 4, onset 7/24/20-Dressing/Treatment: Other (comment)(betadine patricia,iodoform packing,4x4,ABD,kerlix,coban)  Wound 11/02/20 #6, left second toe, diabetic 2,  full thickness, onset 11/2/2020-Dressing/Treatment: Other (comment)(betadine,4x4,ABD,kerlix,coban). Local care, analgesia and offloading per Dr. Daphnie Vasquez.     Continue same Abx, tentative plan 4-6 weeks, depending on how his foot and his labs look next week. Will watch closely for allergic manifestations, Candidiasis, Cdiff, PICC complications, etc; weekly labs ordered, weekly PICC dressings being done. Written discharge instructions given to patient. Follow up in 1 week to see us, daily for HBOT.     Electronically signed by Jeremy Vera MD on 11/17/2020 at 9:33 AM.

## 2020-11-17 NOTE — PROGRESS NOTES
[address]  Hyperbaric Oxygen    Lucia Moe     : 1975    DATE OF VISIT:  2020    Subjective     Lucia Moe is a 39 y.o. 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 He presents to the [facility] today for hyperbaric oxygen treatment of his Reyna 3 DFU and failed 2nd toe soft tissue flap, the former of which is refractory to standard therapy for 30 days. Patient denies fever. Patient denies nausea, vomiting or diarrhea; no ear pain and no other new complaints; no fears or anxiety regarding treatment today. Did note that he felt more \"cracking and popping\" in his ears this weekend than usual. He had maintained from the start of HBO therapy that he did not need to clear his ears during compression, they just \"popped on their own\". Reviewed again with him today that he must actively clear his ears during the entire phase of compression, approximately every other breath (i.e., approximately 60-80 times during the compression phase of his dive). While at depth and during decompression, things should be fine without additional specific effort in terms of equalizing his ears. Objective     Vitals:    20 1113 20 1310   BP: (!) 134/100  Comment: griselda Gallagher asymptomatic (!) 137/96  Comment: MD wilson asymptomatic   Pulse: 97 88   Resp:    Temp: 97.8 °F (36.6 °C) 98.5 °F (36.9 °C)   TempSrc: Oral Oral       General:  Alert, cooperative, no distress.    Ears: External otic canals are within acceptable limits. Teed grade 1 on the right and 1 on the left pre-treatment. Teed grade 0 on the right and 1 on the left post-treatment. Lungs:  Clear to auscultation bilaterally. Ulcer: See wound-care and ID notes. Recent Labs     11/16/20  1109 11/16/20  1312   POCGLU 251* 220*       Assessment     Ashley Chen is a 39 y.o. male who presented to the [facility] today for hyperbaric oxygen treatment #12 of 30 for the diagnosis as stated above. Treatment given at 2 VINCENT for 90 minutes, with no air breaks. Total Treatment Time (min): 110 today, including compression, 100% oxygen at pressure, air breaks if applicable, and decompression. Patient Active Problem List   Diagnosis Code    Hypertension I10    Uncontrolled type 2 diabetes mellitus with diabetic polyneuropathy ([facility]) E11.42, E11.65    Cardiomyopathy ([facility] I42.9    Mixed hyperlipidemia E78.2    Diabetic ulcer of left forefoot associated with type 2 diabetes mellitus, with necrosis of bone ([facility]) E11.621, L97.524    Cellulitis of left foot L03. 116    Allergic rhinitis J30.9    Osteoarthritis M19.90    Acute osteomyelitis of metatarsal bone of left foot ([facility]) M86.172    Surgical wound dehiscence, initial encounter T81.31XA    Failed soft 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 toe amputation site T86.821    Chronic osteomyelitis of left foot ([facility]) U70.937    Post-op hematoma of left foot L76.31    Acute postoperative pain of left foot M79.672, G89.18       In my clinical judgement, ongoing HBO therapy is necessary at this time, given a threat to patient function, limb or life from the current condition. Plan     1. Hyperbaric Oxygen - Linsey Osei tolerated the treatment well today without complications. Continue HBO treatment as outlined above. 2. Other - see wound-care and ID notes from today.      I was present on these premises and immediately available to furnish assistance & direction throughout the procedure.      -- Electronically signed by Arvind Dale MD on 11/17/2020 at 9:04 AM

## 2020-11-18 NOTE — PROGRESS NOTES
[address]  Hyperbaric Oxygen    Meryle Locks     : 1975    DATE OF VISIT:  2020    Subjective     Meryle Locks is a 39 y.o. 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 He presents to the [facility] today for hyperbaric oxygen treatment of his Reyna 3 DFU and failed 2nd toe amputation soft tissue flap, the former of which is refractory to standard therapy for 30 days. Patient denies fever. Patient denies nausea, vomiting or diarrhea; no ear troubles and no other new complaints; no fears or anxiety regarding treatment today. Objective     Vitals:    20 1110 20 1300   BP: (!) 141/92 (!) 147/98   Pulse: 98 94   Resp: 18 18   Temp: 97.9 °F (36.6 °C) 98.1 °F (36.7 °C)   TempSrc: Oral Oral       General:  Alert, cooperative, no distress. Ears: External otic canals are within acceptable limits. Teed grade 0 on the right and 0 on the left pre-treatment. Teed grade 1 on the right and 1 on the left post-treatment. Lungs:  Clear to auscultation bilaterally. Ulcer: Not examined today in HBO, if applicable. Recent Labs     20  1109 20  1312 20  1108 20  1309   POCGLU 251* 220* 275* 224*       Assessment     Meryle Locks is a 39 y.o. male who presented to the [facility] today for hyperbaric oxygen treatment #13 of 30 for the diagnosis as stated above. Treatment given at 2 VINCENT for 90 minutes, with no air breaks. Total Treatment Time (min): 109 today, including compression, 100% oxygen at pressure, air breaks if applicable, and decompression. Patient Active Problem List   Diagnosis Code    Hypertension I10    Uncontrolled type 2 diabetes mellitus with diabetic polyneuropathy ([facility] E11.42, E11.65    Cardiomyopathy ([facility] I42.9    Mixed hyperlipidemia E78.2    Diabetic ulcer of left forefoot associated with type 2 diabetes mellitus, with necrosis of bone ([facility]) E11.621, L97.524    Allergic rhinitis J30.9    Osteoarthritis M19.90    Acute osteomyelitis of metatarsal bone of left foot ([facility]) M86.172    Surgical wound dehiscence, initial encounter T81.31XA    Failed soft 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 toe amputation site T86.821    Chronic osteomyelitis of left foot ([facility] J18.726       In my clinical judgement, ongoing HBO therapy is necessary at this time, given a threat to patient function, limb or life from the current condition. Adjunctive Rx, objective weekly progress and goals of Rx will periodically be updated, on Mondays. Plan     1. Hyperbaric Oxygen - Peyton Osei tolerated the treatment well today without complications. Continue HBO treatment as outlined above. 2. Other -     I was present on these premises and immediately available to furnish assistance & direction throughout the procedure.      -- Electronically signed by Arvind Dale MD on 11/17/2020 at 9:31 PM

## 2020-11-19 ENCOUNTER — HOSPITAL ENCOUNTER (OUTPATIENT)
Dept: HYPERBARIC MEDICINE | Age: 45
Discharge: HOME | End: 2020-11-19
Payer: COMMERCIAL

## 2020-11-19 VITALS
DIASTOLIC BLOOD PRESSURE: 89 MMHG | SYSTOLIC BLOOD PRESSURE: 134 MMHG | RESPIRATION RATE: 16 BRPM | HEART RATE: 88 BPM | TEMPERATURE: 98.1 F

## 2020-11-19 LAB
GLUCOSE BLD-MCNC: 172 MG/DL (ref 70–99)
GLUCOSE BLD-MCNC: 197 MG/DL (ref 70–99)
PERFORMED ON: ABNORMAL
PERFORMED ON: ABNORMAL

## 2020-11-19 PROCEDURE — G0277 HBOT, FULL BODY CHAMBER, 30M: HCPCS

## 2020-11-19 PROCEDURE — 99183 HYPERBARIC OXYGEN THERAPY: CPT | Performed by: EMERGENCY MEDICINE

## 2020-11-19 RX ORDER — PAROXETINE 10 MG/1
10 TABLET, FILM COATED ORAL EVERY MORNING
Qty: 90 TABLET | Refills: 0 | Status: SHIPPED | OUTPATIENT
Start: 2020-11-19 | End: 2021-06-14

## 2020-11-19 ASSESSMENT — PAIN SCALES - GENERAL
PAINLEVEL_OUTOF10: 0
PAINLEVEL_OUTOF10: 0

## 2020-11-19 NOTE — PROGRESS NOTES
[address] [facility],[address]  Hyperbaric Oxygen Therapy   Progress Note      NAME: Zaid Anderson   MEDICAL RECORD NUMBER:  [id number]  AGE: 39 y.o. GENDER: male  : 1975  EPISODE DATE:  2020     Subjective     HBO Treatment Number: 14 out of Total Treatments: 30    HBO Diagnosis:     Indications: Compromised/Failed Flap/Graft to ___(site)  Alba Santos 3 left foot    The patient presents to the [facility] today for hyperbaric oxygen treatment of his Reyna 3 DFU and failed soft tissue flap, the former of which is refractory to standard therapy for 30 days. Patient denies fever. Patient denies nausea, vomiting or diarrhea; no ear troubles and no other new complaints; no fears or anxiety regarding treatment today. Safety checks performed prior to treatment. See doc flowsheets for documentation. Objective           Recent Labs     20  0837 20  1043   POCGLU 247* 236*           Glucose Testing  Blood Glucose POCT: (!) 172     Pre treatment Vital Signs       Temp: 97.2 °F (36.2 °C)     Pulse: 87     Resp: 18   BP: 111/78       Post treatment Vital Signs  Temp: 98.1 °F (36.7 °C)  Pulse: 88  Resp: 16  BP: 134/89     Physical Exam:  General Appearance:  alert and oriented to person, place and time, well-developed and well-nourished, in no acute distress    Pre Tympanic Membrane Assessment:  tympanic membranes intact bilaterally    Post Tympanic Membrane Assessment:  tympanic membranes intact bilaterally     Pulmonary/Chest:  clear to auscultation bilaterally- no wheezes, rales or rhonchi, normal air movement, no respiratory distress    Cardiovascular:  normal, regular rate and rhythm      Assessment        Zaid Anderson is a 39 y.o. male who presented to the [facility] today for hyperbaric oxygen treatment #14 of 30 for the diagnosis as stated above. Treatment given at 2 VINCENT for 90 minutes, with no air breaks.  Total Treatment Time (min): 111 today, including compression, 100% oxygen at pressure, air breaks if applicable, and decompression. Plan        Patient placed in a fully body Monoplace Chamber #: [id number]       Treatment Start Time: 0956     Pressure Reached Time: 1007  VINCENT : 2  Number of Air Breaks:  Treatment Status: No Air break      Decompression Time: 1235  Treatment End Time: 1243  Total Treatment Time: 111 min    Symptoms Noted During Treatment: None      Adverse Event: no      I was present on these premises and immediately available to furnish assistance & direction throughout the procedure. Han Aden is a 39 y.o. male  did successfully complete today's hyperbaric oxygen treatment at [address] [place] [address] and HBO therapy. In my clinical judgement, ongoing HBO therapy is  necessary at this time, given a threat to patient function, limb or life from the current condition. Supervision and attendance of Hyperbaric Oxygen Therapy provided. Continue HBO treatment as outlined in the treatment plan. Hyperbaric Oxygen: Shalonda Osei tolerated Treatment Number: 14 well today without complications.      Electronically signed by Chanda Hagen MD on 11/24/2020 at 9:25 AM

## 2020-11-20 ENCOUNTER — HOSPITAL ENCOUNTER (OUTPATIENT)
Dept: HYPERBARIC MEDICINE | Age: 45
Discharge: HOME | End: 2020-11-20
Payer: COMMERCIAL

## 2020-11-20 VITALS
SYSTOLIC BLOOD PRESSURE: 131 MMHG | TEMPERATURE: 97.8 F | DIASTOLIC BLOOD PRESSURE: 90 MMHG | RESPIRATION RATE: 16 BRPM | HEART RATE: 95 BPM

## 2020-11-20 LAB
GLUCOSE BLD-MCNC: 208 MG/DL (ref 70–99)
GLUCOSE BLD-MCNC: 238 MG/DL (ref 70–99)
PERFORMED ON: ABNORMAL
PERFORMED ON: ABNORMAL

## 2020-11-20 PROCEDURE — 99183 HYPERBARIC OXYGEN THERAPY: CPT | Performed by: INTERNAL MEDICINE

## 2020-11-20 PROCEDURE — G0277 HBOT, FULL BODY CHAMBER, 30M: HCPCS

## 2020-11-20 ASSESSMENT — PAIN SCALES - GENERAL: PAINLEVEL_OUTOF10: 0

## 2020-11-20 NOTE — PLAN OF CARE
Patient seen for HBOT treatment  15 /  30   today. Patient assessment WNL- AVSS, TEED=1 bilat - no pain, FSBS 238 a-cleared for HBOT. Patient was compressed in HBO chamber to 2.0 VINCENT at 1.5psi/min x 90 min treatment w/o air breaks . Patient is tolerating treatment well and is currently resting comfortably and watching television. HBO RN at chamber side, will continue to monitor.

## 2020-11-20 NOTE — PROGRESS NOTES
[facility] Progress Note      Angi Anthony     : 1975    DATE OF VISIT:  2020    Subjective:     Angi Anthony is a 39 y.o. male who has a chief complaint of a diabetic ulcer located on the left foot. Has been staying off his foot. No issues with HBO or antibiotics. States doing well with wound at this time. States wound looks better at this time. Mr. Oriana Johnson has a past medical history of Acute osteomyelitis of right hallux ([address] Cellulitis of left foot, CHF (congestive heart failure) ([address] Clostridium difficile infection, Diabetic ulcer of right great toe associated with type 2 diabetes mellitus, with necrosis of bone ([address] Migraine, Possible perforated tympanic membrane, Post-op hematoma of left foot, Recurrent otitis media, Tear of medial meniscus of left knee, Tobacco use, and Toe osteomyelitis, left ([address] He has a past surgical history that includes Tonsillectomy; [place] tooth extraction; Knee arthroscopy (Left, [id number]); Toe amputation (Right, 2019); other surgical history (Left, 2020); Toe amputation (Left, 2020); and Toe amputation (Left, 10/22/2020). His family history includes Diabetes in his father and mother; High Blood Pressure in his father, maternal grandmother, maternal uncle, mother, and sister; High Cholesterol in his father, maternal uncle, and mother; Stroke in his father. Mr. Oriana Johnson reports that he quit smoking about 15 years ago. He has a 1.00 pack-year smoking history. He quit smokeless tobacco use about 15 years ago. He reports current alcohol use. He reports that he does not use drugs.     His current medication list consists of Aspirin-Acetaminophen-Caffeine, Insulin Glargine (1 Unit Dial), PARoxetine, ammonium lactate, carvedilol, daptomycin, furosemide, gabapentin, glimepiride, insulin lispro, insulin lispro (1 Unit Dial), loratadine, losartan, meropenem, oxymetazoline, sildenafil, simvastatin, tiZANidine, and traZODone. Allergies: Januvia [sitagliptin]; Metformin and related; Vancomycin; and Mustard oil [allyl isothiocyanate]    Pertinent items from the review of systems are discussed in the HPI; the remainder of the ROS was reviewed and is negative. Objective:     BP (!) 137/96   Pulse 86   Temp 98.5 °F (36.9 °C) (Oral)   Resp 18   General Appearance: alert and oriented to person, place and time, well developed and well- nourished, in no acute distress  Skin: warm and dry, no rash or erythema  Head: normocephalic and atraumatic  Eyes: pupils equal, round, and reactive to light, extraocular eye movements intact, conjunctivae normal  ENT: tympanic membrane, external ear and ear canal normal bilaterally, nose without deformity, nasal mucosa and turbinates normal without polyps  Neck: supple and non-tender without mass, no thyromegaly or thyroid nodules, no cervical lymphadenopathy  Pulmonary/Chest: clear to auscultation bilaterally- no wheezes, rales or rhonchi, normal air movement, no respiratory distress  Cardiovascular: normal rate, regular rhythm, normal S1 and S2, no murmurs, rubs, clicks, or gallops, distal pulses intact, no carotid bruits  Abdomen: soft, non-tender, non-distended, normal bowel sounds, no masses or organomegaly.      Dorsalis pedis pulse left palpable  Posterior tibial pulse left palpable  Dorsalis pedis pulse right palpable  Posterior tibial pulse right palpable    Ulcer on the left 2nd digit amputation site with mild fibrotic tissue, red granulation tissue, mild serous drainage, no hyperkeratotic rim, no undermining, no tunneling, no purulence, no malodor, no eschar, no periwound maceration, mild periwound erythema, mild edema, no crepitus, no increase in skin temperature, ulcer probes to soft tissue only     Ulcer on the left hallux amputation site with minimal fibrotic tissue, red granulation tissue, mild serous drainage, no hyperkeratotic rim, no undermining, no mcg/mL      trimethoprim-sulfamethoxazole  Sensitive  <=2/38  mcg/mL      Alcaligenes faecalis (3)     Antibiotic  Interpretation  SIMONE  Status     cefepime  Sensitive  4  mcg/mL      cefTRIAXone  Sensitive  <=1  mcg/mL      ciprofloxacin  Sensitive  <=1  mcg/mL      gentamicin  Resistant  <=4  mcg/mL      meropenem  Sensitive  <=1  mcg/mL      piperacillin-tazobactam  Sensitive  <=16  mcg/mL      tobramycin  Sensitive  <=4  mcg/mL      trimethoprim-sulfamethoxazole  Sensitive  <=2/38  mcg/mL          Path - chronic and acute osteomyelitis      Assessment:     Patient Active Problem List   Diagnosis Code    Hypertension I10    Uncontrolled type 2 diabetes mellitus with diabetic polyneuropathy ([facility] E11.42, E11.65    Cardiomyopathy ([facility] I42.9    Mixed hyperlipidemia E78.2    Diabetic ulcer of left forefoot associated with type 2 diabetes mellitus, with necrosis of bone ([facility]) E11.621, L97.524    Allergic rhinitis J30.9    Osteoarthritis M19.90    Acute osteomyelitis of metatarsal bone of left foot ([facility]) M86.172    Surgical wound dehiscence, initial encounter T81.31XA    Failed soft 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 toe amputation site T86.821    Chronic osteomyelitis of left foot ([facility]) V81.405       Assessment of today's active condition(s): osteomyelitis left foot, diabetic foot ulcer left hallux amputation, diabetes mellitus. Factors contributing to occurrence and/or persistence of the chronic ulcer include diabetes and poor glucose control. Sharp debridement is indicated today, based upon the exam findings in the ulcer(s) above. Procedure note:     Consent obtained. Time out taken. Anesthetic  Anesthetic:  (post debridement)     After application of topical 4% lidocaine plain to the ulcer, the ulcer was debrided of all fibrotic, necrotic, hyperkeratotic tissue, nonviable and viable tissue through the subcutaneous tissue. This excised skin and subcutaneous tissue with a scalpel.  Total Surface Area Debrided: 3 sq cm.      The ulcer(s) were then irrigated with normal saline solution. The procedure was completed with a small amount of bleeding, and hemostasis was by pressure. The patient tolerated the procedure well, with no significant complications. The patient's level of pain during and after the procedure was monitored, and is noted in the wound documentation flowsheet. e      Discharge plan:     Treatment in the wound care center today: Wound 07/31/20 Proximal #1, Left Great toe amp site, DFU, Reyna 4, onset 7/24/20-Dressing/Treatment: Other (comment)(betadine patricia,iodoform packing,4x4,ABD,kerlix,coban)  Wound 11/02/20 #6, left second toe, diabetic 2,  full thickness, onset 11/2/2020-Dressing/Treatment: Other (comment)(betadine,4x4,ABD,kerlix,coban). Home treatment: good handwashing before and after any dressing changes. Cleanse ulcer with saline or soap & water before dressing change. May use Vaseline (petrolatum), Aquaphor, Aveeno, CeraVe, Cetaphil, Eucerin, Lubriderm, etc for dry skin. Dressing type for home: Iodoform and dry dressing applied to be changed on Thursday. Written discharge instructions given to patient. Offload ulcer(s) as directed. Elevate leg(s) as directed. Follow up in 1 week. Needs to control glucose levels. HBO and antibiotics as per Dr. Elizabeth Gallo. Discussed in room with Dr. Elizabeth Gallo.        Electronically signed by Dianna Khan DPM on 11/20/2020 at 8:10 AM.

## 2020-11-22 NOTE — PROGRESS NOTES
112 today, including compression, 100% oxygen at pressure, air breaks if applicable, and decompression. Patient Active Problem List   Diagnosis Code    Hypertension I10    Uncontrolled type 2 diabetes mellitus with diabetic polyneuropathy ([facility] E11.42, E11.65    Cardiomyopathy ([facility] I42.9    Mixed hyperlipidemia E78.2    Diabetic ulcer of left forefoot associated with type 2 diabetes mellitus, with necrosis of bone ([facility]) E11.621, L97.524    Allergic rhinitis J30.9    Osteoarthritis M19.90    Acute osteomyelitis of metatarsal bone of left foot ([facility]) M86.172    Surgical wound dehiscence, initial encounter T81.31XA    Failed soft 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 toe amputation site T86.821    Chronic osteomyelitis of left foot ([facility] Y45.003       In my clinical judgement, ongoing HBO therapy is necessary at this time, given a threat to patient function, limb or life from the current condition. Adjunctive Rx, objective weekly progress and goals of Rx will periodically be updated, on Mondays. Plan     1. Hyperbaric Oxygen - Napoleon Lohman A Demaris tolerated the treatment well today without complications. Continue HBO treatment as outlined above. 2. Other -     I was present on these premises and immediately available to furnish assistance & direction throughout the procedure.      -- Electronically signed by Riley Aly MD on 11/22/2020 at 3:21 PM

## 2020-11-23 ENCOUNTER — HOSPITAL ENCOUNTER (OUTPATIENT)
Dept: WOUND CARE | Age: 45
Discharge: HOME | End: 2020-11-23
Payer: COMMERCIAL

## 2020-11-23 ENCOUNTER — HOSPITAL ENCOUNTER (OUTPATIENT)
Age: 45
Setting detail: SPECIMEN
Discharge: HOME | End: 2020-11-23
Payer: COMMERCIAL

## 2020-11-23 ENCOUNTER — HOSPITAL ENCOUNTER (OUTPATIENT)
Dept: HYPERBARIC MEDICINE | Age: 45
Discharge: HOME | End: 2020-11-23
Payer: COMMERCIAL

## 2020-11-23 VITALS
HEART RATE: 88 BPM | RESPIRATION RATE: 16 BRPM | SYSTOLIC BLOOD PRESSURE: 150 MMHG | DIASTOLIC BLOOD PRESSURE: 93 MMHG | TEMPERATURE: 98.2 F

## 2020-11-23 VITALS — WEIGHT: 262 LBS | BODY MASS INDEX: 35.49 KG/M2 | HEIGHT: 72 IN

## 2020-11-23 LAB
A/G RATIO: 2 (ref 1.1–2.2)
ALBUMIN SERPL-MCNC: 4.5 G/DL (ref 3.4–5)
ALP BLD-CCNC: 141 U/L (ref 40–129)
ALT SERPL-CCNC: 28 U/L (ref 10–40)
ANION GAP SERPL CALCULATED.3IONS-SCNC: 11 MMOL/L (ref 3–16)
AST SERPL-CCNC: 22 U/L (ref 15–37)
BASOPHILS ABSOLUTE: 0.1 K/UL (ref 0–0.2)
BASOPHILS RELATIVE PERCENT: 1 %
BILIRUB SERPL-MCNC: 2.5 MG/DL (ref 0–1)
BUN BLDV-MCNC: 20 MG/DL (ref 7–20)
CALCIUM SERPL-MCNC: 9.7 MG/DL (ref 8.3–10.6)
CHLORIDE BLD-SCNC: 92 MMOL/L (ref 99–110)
CO2: 28 MMOL/L (ref 21–32)
CREAT SERPL-MCNC: 0.7 MG/DL (ref 0.9–1.3)
EOSINOPHILS ABSOLUTE: 0.5 K/UL (ref 0–0.6)
EOSINOPHILS RELATIVE PERCENT: 5.1 %
GFR AFRICAN AMERICAN: >60
GFR NON-AFRICAN AMERICAN: >60
GLOBULIN: 2.3 G/DL
GLUCOSE BLD-MCNC: 236 MG/DL (ref 70–99)
GLUCOSE BLD-MCNC: 247 MG/DL (ref 70–99)
GLUCOSE BLD-MCNC: 294 MG/DL (ref 70–99)
HCT VFR BLD CALC: 40.8 % (ref 40.5–52.5)
HEMOGLOBIN: 14.3 G/DL (ref 13.5–17.5)
LYMPHOCYTES ABSOLUTE: 2.8 K/UL (ref 1–5.1)
LYMPHOCYTES RELATIVE PERCENT: 31.2 %
MCH RBC QN AUTO: 31.6 PG (ref 26–34)
MCHC RBC AUTO-ENTMCNC: 35.1 G/DL (ref 31–36)
MCV RBC AUTO: 89.9 FL (ref 80–100)
MONOCYTES ABSOLUTE: 0.7 K/UL (ref 0–1.3)
MONOCYTES RELATIVE PERCENT: 8 %
NEUTROPHILS ABSOLUTE: 4.9 K/UL (ref 1.7–7.7)
NEUTROPHILS RELATIVE PERCENT: 54.7 %
PDW BLD-RTO: 13.8 % (ref 12.4–15.4)
PERFORMED ON: ABNORMAL
PERFORMED ON: ABNORMAL
PLATELET # BLD: 214 K/UL (ref 135–450)
PMV BLD AUTO: 8.1 FL (ref 5–10.5)
POTASSIUM SERPL-SCNC: 4.5 MMOL/L (ref 3.5–5.1)
RBC # BLD: 4.54 M/UL (ref 4.2–5.9)
SEDIMENTATION RATE, ERYTHROCYTE: 28 MM/HR (ref 0–15)
SODIUM BLD-SCNC: 131 MMOL/L (ref 136–145)
TOTAL CK: 87 U/L (ref 39–308)
TOTAL PROTEIN: 6.8 G/DL (ref 6.4–8.2)
WBC # BLD: 8.9 K/UL (ref 4–11)

## 2020-11-23 PROCEDURE — 85652 RBC SED RATE AUTOMATED: CPT

## 2020-11-23 PROCEDURE — 82550 ASSAY OF CK (CPK): CPT

## 2020-11-23 PROCEDURE — G0277 HBOT, FULL BODY CHAMBER, 30M: HCPCS

## 2020-11-23 PROCEDURE — 99183 HYPERBARIC OXYGEN THERAPY: CPT | Performed by: INTERNAL MEDICINE

## 2020-11-23 PROCEDURE — 99213 OFFICE O/P EST LOW 20 MIN: CPT | Performed by: INTERNAL MEDICINE

## 2020-11-23 PROCEDURE — 36415 COLL VENOUS BLD VENIPUNCTURE: CPT

## 2020-11-23 PROCEDURE — 86140 C-REACTIVE PROTEIN: CPT

## 2020-11-23 PROCEDURE — 99212 OFFICE O/P EST SF 10 MIN: CPT

## 2020-11-23 PROCEDURE — 80053 COMPREHEN METABOLIC PANEL: CPT

## 2020-11-23 PROCEDURE — 85025 COMPLETE CBC W/AUTO DIFF WBC: CPT

## 2020-11-23 ASSESSMENT — PAIN SCALES - GENERAL
PAINLEVEL_OUTOF10: 0

## 2020-11-23 NOTE — PLAN OF CARE
Pt to the [facility] for follow up appointment. Wounds not debrided today. Pt to continue with biweekly dressing changes with dressing being changed again this week on Wednesday due to holiday. Pt to continue with HBOT. Pt to have lab work drawn today per home care. Dr Mahnaz Edwards will decide whether to continue IV antibiotics after reviewing lab work. Pt to follow up in the [facility] with Dr Titi Islas in 1 week. Discharge instructions reviewed with patient, all questions answered, copy given to patient. Dressings were applied to all wounds per M.D. Instructions at this visit.

## 2020-11-24 ENCOUNTER — HOSPITAL ENCOUNTER (OUTPATIENT)
Dept: HYPERBARIC MEDICINE | Age: 45
Discharge: HOME | End: 2020-11-24
Payer: COMMERCIAL

## 2020-11-24 VITALS
RESPIRATION RATE: 16 BRPM | DIASTOLIC BLOOD PRESSURE: 83 MMHG | SYSTOLIC BLOOD PRESSURE: 120 MMHG | HEART RATE: 95 BPM | TEMPERATURE: 97.8 F

## 2020-11-24 PROBLEM — T86.821 FAILED SKIN GRAFT: Status: RESOLVED | Noted: 2020-10-26 | Resolved: 2020-11-24

## 2020-11-24 LAB
C-REACTIVE PROTEIN: 6 MG/L (ref 0–5.1)
GLUCOSE BLD-MCNC: 208 MG/DL (ref 70–99)
GLUCOSE BLD-MCNC: 225 MG/DL (ref 70–99)
PERFORMED ON: ABNORMAL
PERFORMED ON: ABNORMAL

## 2020-11-24 PROCEDURE — G0277 HBOT, FULL BODY CHAMBER, 30M: HCPCS

## 2020-11-24 PROCEDURE — 99183 HYPERBARIC OXYGEN THERAPY: CPT | Performed by: INTERNAL MEDICINE

## 2020-11-24 ASSESSMENT — PAIN SCALES - GENERAL
PAINLEVEL_OUTOF10: 0
PAINLEVEL_OUTOF10: 0

## 2020-11-24 NOTE — PROGRESS NOTES
[address]  Hyperbaric Oxygen    Brendan Mathews     : 1975    DATE OF VISIT:  2020    Subjective     Brendan Mathews is a 39 y.o. male who  has a past medical history of Acute osteomyelitis of right hallux ([facility] (2019), Cellulitis of left foot (2020), CHF (congestive heart failure) ([facility] (2014), Clostridium difficile infection (2016), Diabetic ulcer of right great toe associated with type 2 diabetes mellitus, with necrosis of bone ([facility] (2019), Migraine, Possible perforated tympanic membrane, Post-op hematoma of left foot (2020), Recurrent otitis media, Tear of medial meniscus of left knee, Tobacco use, and Toe osteomyelitis, left ([facility] (2020). He presents to the [facility] today for hyperbaric oxygen treatment of his Reyna 3 DFU and failed 2nd toe amputation flap, the former of which is refractory to standard therapy for 30 days. Patient denies fever. Patient denies nausea, vomiting or diarrhea; no ear troubles and no other new complaints; no fears or anxiety regarding treatment today. Continues to say that his ears tend to pop and crack a lot; continues to say that he does not need to clear his ears when diving, they just \"pop on their own\", despite our prior instructions to the contrary. No ear pain. Significant visual changes the last couple of weeks (binocular blurry vision at a distance). Objective     Vitals:    20 0840 20 1041   BP: (!) 147/103  Comment: asymptomatic (!) 150/93   Pulse: 101 88   Resp: 16 16   Temp: 97.9 °F (36.6 °C) 98.2 °F (36.8 °C)   TempSrc: Oral Oral       General:  Alert, cooperative, no distress. Ears: External otic canals are within acceptable limits. Teed grade 1 on the right and 1 on the left pre-treatment. Teed grade 1 on the right and 1 on the left post-treatment. Lungs:  Clear to auscultation bilaterally.     Ulcer: See wound-care and ID

## 2020-11-24 NOTE — PROGRESS NOTES
[facility] Progress Note    Kylah Gaston     : 1975    DATE OF VISIT:  2020    Subjective:     Kylah Gaston is a 39 y.o. male who has a diabetic and  dehisced surgical ulcer located on the foot (left , forefoot). Current complaint of pain in this ulcer? yes. Quality of pain: aching and throbbing  Timing: intermittent and stable  Severity: mild  Associated Signs/Symptoms: numbness, still some swelling, less redness, moderate serous-serosanguinous drainage  Other significant symptoms or pertinent ulcer history: feeling well overall, no F/C/D. Tolerating Abx well, no PICC discomfort right now, no sore throat or mouth, no drug rash or pruritus, no N/V/D. Tolerating HBO well apart from some changes in his far-vision. No ear pain, no respiratory symptoms. Trying his best to stay off the foot; just has a surgical shoe; glucoses still elevated, at least when here for HBO. Additional ulcer(s) noted? no.      Mr. Ralf Luevano has a past medical history of Acute osteomyelitis of right hallux ([address] Cellulitis of left foot, CHF (congestive heart failure) ([address] Clostridium difficile infection, Diabetic ulcer of right great toe associated with type 2 diabetes mellitus, with necrosis of bone ([address] Failed soft 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 toe amputation site, Migraine, Possible perforated tympanic membrane, Post-op hematoma of left foot, Recurrent otitis media, Tear of medial meniscus of left knee, Tobacco use, and Toe osteomyelitis, left ([address] He has a past surgical history that includes Tonsillectomy; [place] tooth extraction; Knee arthroscopy (Left, [id number]); Toe amputation (Right, 2019); other surgical history (Left, 2020); Toe amputation (Left, 2020); and Toe amputation (Left, 10/22/2020).     His family history includes Diabetes in his father and mother; High Blood Pressure in his father, maternal grandmother, maternal uncle, mother, and sister; High Cholesterol in his father, maternal uncle, and mother; Stroke in his father. Mr. Lisa Dominguez reports that he quit smoking about 15 years ago. He has a 1.00 pack-year smoking history. He quit smokeless tobacco use about 15 years ago. He reports current alcohol use. He reports that he does not use drugs. His current medication list consists of Aspirin-Acetaminophen-Caffeine, Insulin Glargine (1 Unit Dial), PARoxetine, ammonium lactate, carvedilol, daptomycin, furosemide, gabapentin, glimepiride, insulin lispro, insulin lispro (1 Unit Dial), loratadine, losartan, meropenem, oxymetazoline, sildenafil, simvastatin, tiZANidine, and traZODone. He's had almost 4 weeks of post-op IV Abx at this point. Allergies: Januvia [sitagliptin]; Metformin and related; Vancomycin; and Mustard oil [allyl isothiocyanate]    Pertinent items from the review of systems are discussed in the HPI; the remainder of the ROS was reviewed and is negative. Objective:     Vitals:    11/23/20 1104   Weight: 262 lb (118.8 kg)   Height: 6' (1.829 m)     From the end of HBO today -- T 98.2, HR 88, /93, RR 16. Constitutional:  well-developed, well-nourished, overweight, NAD  Psychiatric:  oriented to person, place and time; mood and affect appropriate for the situation   Eyes:  pupils equal, round and reactive to light; sclerae anicteric, conjunctivae not pale  ENT: no thrush or oral ulcers, mucous membranes moist  Abd: soft, NT, ND, good BS  Cardiovascular:  bilateral pedal pulses palpable, feet warm, good cap refill; mild BL lower extremity edema; PICC site benign, not tender, not inflamed, no palpable cord  Lymphatic:  no inguinal or popliteal adenopathy, no angitis, I think no residual cellulitis   Musculoskeletal:  no clubbing, cyanosis or petechiae; RLE and LLE with no gross effusions or acute arthritis  Shanita-ulcer skin: indurated, pink, juhi, warm and dry.   Ulcer(s): both are more granular, a bit smaller, some fibrin and biofilm, no deep soft tissue necrosis, no biofilm; the 2nd toe site has very little depth, but the 1st ray site still probes in the plantar direction for about 1.5 cm, though in a smaller area than before. Photos also saved in electronic chart. Today's Wound Measurements, per RN documentation:  Wound 11/02/20 #6, left second toe, diabetic 2,  full thickness, onset 11/2/2020-Wound Length (cm): 1 cm  Wound 07/31/20 Proximal #1, Left Great toe amp site, DFU, Reyna 4, onset 7/24/20-Wound Length (cm): 4.2 cm    Wound 11/02/20 #6, left second toe, diabetic 2,  full thickness, onset 11/2/2020-Wound Width (cm): 1 cm  Wound 07/31/20 Proximal #1, Left Great toe amp site, DFU, Reyna 4, onset 7/24/20-Wound Width (cm): 1 cm    Wound 11/02/20 #6, left second toe, diabetic 2,  full thickness, onset 11/2/2020-Wound Depth (cm): 0.1 cm  Wound 07/31/20 Proximal #1, Left Great toe amp site, DFU, Reyna 4, onset 7/24/20-Wound Depth (cm): 1.5 cm  ______________________________    Lab Results   Component Value Date    LABALBU 4.5 11/23/2020     Lab Results   Component Value Date    CREATININE 0.7 (L) 11/23/2020     Lab Results   Component Value Date    HGB 14.3 11/23/2020     Lab Results   Component Value Date    LABA1C 7.8 10/26/2020       Recent lab trends (with pre-op Augmentin, OR on Oct 22, then IV Abx starting on about Oct 27) --     Oct 26 --         Creat 0.7, CK 26, cRP 107.4, alb 3.9, alk phos 169, WBC 11.2, Hgb 13.1, plts 302k, 200 Eos, .     Nov 2 --           Creat < 0.5, CK 60, cRP 32.4, alb 4.1, alk phos 185, WBC 14.7, Hgb 13.0, plts 392k, 300 Eos, ESR 79.      Nov 9 --           Creat 0.6, CK 48, cRP 26.4, alb 4.2, alk phos 159, WBC 9.4, Hgb 13.3, plts 240k, 200 Eos, ESR 57.     Nov 16 --         Creat 0.6, CK 42, cRP 8.1, alb 4.2, alk phos 148, WBC 7.4, Hgb 13.4, plts 216k, 300 Eos, ESR 37. Nov 23 -- Creat 0.6, CK 87, cRP pending, alb 4.5, alk phos 141, WBC 8.9, Hgb 14.3, plts 214k, 500 Eos, ESR 28. Assessment:     Patient Active Problem List   Diagnosis Code    Hypertension I10    Uncontrolled type 2 diabetes mellitus with diabetic polyneuropathy ([facility]) E11.42, E11.65    Cardiomyopathy [facility] I42.9    Mixed hyperlipidemia E78.2    Diabetic ulcer of left forefoot associated with type 2 diabetes mellitus, with necrosis of bone ([facility]) E11.621, L97.524    Allergic rhinitis J30.9    Osteoarthritis M19.90    Acute osteomyelitis of metatarsal bone of left foot ([facility]) M86.172    Surgical wound dehiscence, initial encounter T81.31XA    Chronic osteomyelitis of left foot ([facility]) M86.672       Assessment of today's active condition(s): uncontrolled but recently improved DM2, neuropathy, no strong signs of PAD, Reyna 3 DFU, recent flare of soft tissue infection plus acute and chronic osteomyelitis, about a month post-op from a 2nd toe amp and partial 1st metatarsal resection, both wounds dehisced, on Abx for presumed residual cellulitis; overall doing rather well with IV Abx, HBO and current local care. I'm not sure if the osteomyelitis is completely resolved, but I get a sense that it is very close -- would be interested to see the cRP from this week, but it's not back yet. Factors contributing to occurrence and/or persistence of the chronic ulcer include lymphedema, diabetes, poor glucose control, shear force and non-adherence. Medical necessity of today's visit is shown by the above documentation. Sharp debridement per Dr. ROBIN Espinoza. Discharge plan:     Treatment in the wound care center today, per RN documentation: Wound 11/02/20 #6, left second toe, diabetic 2,  full thickness, onset 11/2/2020-Dressing/Treatment: Other (comment)(betadine,4x4,cast padding,kerlix,coban)  Wound 07/31/20 Proximal #1, Left Great toe amp site, DFU, Reyna 4, onset 7/24/20-Dressing/Treatment: (betadine,4x4,cast padding,kerlix,coban). Local wound care and analgesia per Dr. ROBIN Espinoza.   Needs to keep working on glucose control / diabetic diet. Continue HBO for now, at least until that larger wound's depth is much improved. Would recommend that he do more to keep pressure off the foot, as we discussed before. Will double-check to make sure that he and Dr. Davi Balderas have discussed his recent lipid panel (though it wasn't fasting). If his cRP is normal this week, we can stop IV Abx and pull his PICC. If it is still elevated, I think the overall risk-benefit process would favor extending his Abx by two more weeks, particularly since some of his osteomyelitis WAS chronic, pathologically. Written discharge instructions given to patient. Follow up in 1 week to see Dr. Fely Strauss and me, daily for HBO.     Electronically signed by Kimberly Foster MD on 11/24/2020 at 10:18 AM.

## 2020-11-25 ENCOUNTER — HOSPITAL ENCOUNTER (OUTPATIENT)
Dept: HYPERBARIC MEDICINE | Age: 45
Discharge: HOME | End: 2020-11-25
Payer: COMMERCIAL

## 2020-11-25 VITALS
RESPIRATION RATE: 16 BRPM | HEART RATE: 89 BPM | TEMPERATURE: 98.6 F | SYSTOLIC BLOOD PRESSURE: 142 MMHG | DIASTOLIC BLOOD PRESSURE: 83 MMHG

## 2020-11-25 LAB
GLUCOSE BLD-MCNC: 218 MG/DL (ref 70–99)
PERFORMED ON: ABNORMAL

## 2020-11-25 PROCEDURE — 99183 HYPERBARIC OXYGEN THERAPY: CPT | Performed by: EMERGENCY MEDICINE

## 2020-11-25 PROCEDURE — G0277 HBOT, FULL BODY CHAMBER, 30M: HCPCS

## 2020-11-25 ASSESSMENT — PAIN SCALES - GENERAL
PAINLEVEL_OUTOF10: 0
PAINLEVEL_OUTOF10: 0

## 2020-11-25 NOTE — PLAN OF CARE
Pt to the [address] [place] [address],[address] for HBOT. Assessment completed and patient cleared for treatment. Pt to 2.0 VINCENT at a rate of 1.5 psi. Pt to pressure without complaints. Pt resting and watching tv. Nurse at chamber side and will continue to monitor.

## 2020-11-25 NOTE — PROGRESS NOTES
[address]  Hyperbaric Oxygen    Manasa Martin     : 1975    DATE OF VISIT:  2020    Subjective     Manasa Martin is a 39 y.o. male who  has a past medical history of Acute osteomyelitis of right hallux ([facility] [address] (2019), Cellulitis of left foot (2020), CHF (congestive heart failure) ([facility] (2014), Clostridium difficile infection (2016), Diabetic ulcer of right great toe associated with type 2 diabetes mellitus, with necrosis of bone ([facility] (2019), Failed soft 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 toe amputation site (10/26/2020), Migraine, Possible perforated tympanic membrane, Post-op hematoma of left foot (2020), Recurrent otitis media, Tear of medial meniscus of left knee, Tobacco use, and Toe osteomyelitis, left ([facility] (2020). He presents to the [facility] today for hyperbaric oxygen treatment of his Reyna 3 DFU, which is refractory to standard therapy for 30 days. Patient denies fever. Patient denies nausea, vomiting or diarrhea; no ear troubles and no other new complaints; no fears or anxiety regarding treatment today. Objective     Vitals:    20 1115 20 1315   BP: 109/76 120/83   Pulse: 99 95   Resp: 16 16   Temp: 98.7 °F (37.1 °C) 97.8 °F (36.6 °C)   TempSrc: Oral Oral       General:  Alert, cooperative, no distress. Ears: External otic canals are within acceptable limits. Teed grade 1 on the right and 1 on the left pre-treatment. Teed grade 1 on the right and 1 on the left post-treatment. Lungs:  Clear to auscultation bilaterally. Ulcer: Not examined today in HBO, if applicable. Recent Labs     20  0837 20  1043 20  1113 20  1316   POCGLU 247* 236* 225* 208*       Assessment     Manasa Martin is a 39 y.o. male who presented to the [facility] today for hyperbaric oxygen treatment #17 of 30 for the diagnosis as stated above.

## 2020-11-25 NOTE — PROGRESS NOTES
air breaks. today, including compression, 100% oxygen at pressure, air breaks if applicable, and decompression. Patient Active Problem List   Diagnosis Code    Hypertension I10    Uncontrolled type 2 diabetes mellitus with diabetic polyneuropathy ([facility]) E11.42, E11.65    Cardiomyopathy ([facility] I42.9    Mixed hyperlipidemia E78.2    Diabetic ulcer of left forefoot associated with type 2 diabetes mellitus, with necrosis of bone ([facility]) E11.621, L97.524    Allergic rhinitis J30.9    Osteoarthritis M19.90    Acute osteomyelitis of metatarsal bone of left foot ([facility]) M86.172    Surgical wound dehiscence, initial encounter T81.31XA    Chronic osteomyelitis of left foot ([facility] T00.716       In my clinical judgement, ongoing HBO therapy is necessary at this time, given a threat to patient function, limb or life from the current condition. Adjunctive Rx, objective weekly progress and goals of Rx will periodically be updated, on Mondays. Plan     1. Hyperbaric Oxygen - Berta Osei tolerated the treatment well today without complications. Continue HBO treatment as outlined above. 2. Other -     I was present on these premises and immediately available to furnish assistance & direction throughout the procedure.      -- Electronically signed by Ronak Morales MD on 11/25/2020 at 10:22 AM

## 2020-11-27 LAB
GLUCOSE BLD-MCNC: 158 MG/DL (ref 70–99)
PERFORMED ON: ABNORMAL

## 2020-11-30 ENCOUNTER — HOSPITAL ENCOUNTER (OUTPATIENT)
Age: 45
Setting detail: SPECIMEN
Discharge: HOME | End: 2020-11-30
Payer: COMMERCIAL

## 2020-11-30 ENCOUNTER — HOSPITAL ENCOUNTER (OUTPATIENT)
Dept: WOUND CARE | Age: 45
Discharge: HOME | End: 2020-11-30
Payer: COMMERCIAL

## 2020-11-30 ENCOUNTER — HOSPITAL ENCOUNTER (OUTPATIENT)
Dept: HYPERBARIC MEDICINE | Age: 45
Discharge: HOME | End: 2020-11-30
Payer: COMMERCIAL

## 2020-11-30 VITALS — HEIGHT: 72 IN | BODY MASS INDEX: 36.82 KG/M2 | TEMPERATURE: 98.2 F | WEIGHT: 271.8 LBS

## 2020-11-30 LAB
A/G RATIO: 1.7 (ref 1.1–2.2)
ALBUMIN SERPL-MCNC: 4.5 G/DL (ref 3.4–5)
ALP BLD-CCNC: 151 U/L (ref 40–129)
ALT SERPL-CCNC: 49 U/L (ref 10–40)
ANION GAP SERPL CALCULATED.3IONS-SCNC: 11 MMOL/L (ref 3–16)
AST SERPL-CCNC: 24 U/L (ref 15–37)
BASOPHILS ABSOLUTE: 0.1 K/UL (ref 0–0.2)
BASOPHILS RELATIVE PERCENT: 0.9 %
BILIRUB SERPL-MCNC: 2.3 MG/DL (ref 0–1)
BUN BLDV-MCNC: 19 MG/DL (ref 7–20)
CALCIUM SERPL-MCNC: 9.7 MG/DL (ref 8.3–10.6)
CHLORIDE BLD-SCNC: 99 MMOL/L (ref 99–110)
CO2: 28 MMOL/L (ref 21–32)
CREAT SERPL-MCNC: 0.6 MG/DL (ref 0.9–1.3)
EOSINOPHILS ABSOLUTE: 0.3 K/UL (ref 0–0.6)
EOSINOPHILS RELATIVE PERCENT: 3.9 %
GFR AFRICAN AMERICAN: >60
GFR NON-AFRICAN AMERICAN: >60
GLOBULIN: 2.6 G/DL
GLUCOSE BLD-MCNC: 163 MG/DL (ref 70–99)
GLUCOSE BLD-MCNC: 251 MG/DL (ref 70–99)
GLUCOSE BLD-MCNC: 267 MG/DL (ref 70–99)
HCT VFR BLD CALC: 40.9 % (ref 40.5–52.5)
HEMOGLOBIN: 14.2 G/DL (ref 13.5–17.5)
LYMPHOCYTES ABSOLUTE: 2.1 K/UL (ref 1–5.1)
LYMPHOCYTES RELATIVE PERCENT: 28.4 %
MCH RBC QN AUTO: 31.6 PG (ref 26–34)
MCHC RBC AUTO-ENTMCNC: 34.9 G/DL (ref 31–36)
MCV RBC AUTO: 90.5 FL (ref 80–100)
MONOCYTES ABSOLUTE: 0.7 K/UL (ref 0–1.3)
MONOCYTES RELATIVE PERCENT: 9.9 %
NEUTROPHILS ABSOLUTE: 4.1 K/UL (ref 1.7–7.7)
NEUTROPHILS RELATIVE PERCENT: 56.9 %
PDW BLD-RTO: 14.6 % (ref 12.4–15.4)
PERFORMED ON: ABNORMAL
PERFORMED ON: ABNORMAL
PLATELET # BLD: 197 K/UL (ref 135–450)
PMV BLD AUTO: 8.2 FL (ref 5–10.5)
POTASSIUM SERPL-SCNC: 4.4 MMOL/L (ref 3.5–5.1)
RBC # BLD: 4.51 M/UL (ref 4.2–5.9)
SEDIMENTATION RATE, ERYTHROCYTE: 23 MM/HR (ref 0–15)
SODIUM BLD-SCNC: 138 MMOL/L (ref 136–145)
TOTAL CK: 60 U/L (ref 39–308)
TOTAL PROTEIN: 7.1 G/DL (ref 6.4–8.2)
WBC # BLD: 7.2 K/UL (ref 4–11)

## 2020-11-30 PROCEDURE — 82550 ASSAY OF CK (CPK): CPT

## 2020-11-30 PROCEDURE — 99213 OFFICE O/P EST LOW 20 MIN: CPT | Performed by: INTERNAL MEDICINE

## 2020-11-30 PROCEDURE — 99183 HYPERBARIC OXYGEN THERAPY: CPT | Performed by: INTERNAL MEDICINE

## 2020-11-30 PROCEDURE — G0277 HBOT, FULL BODY CHAMBER, 30M: HCPCS

## 2020-11-30 PROCEDURE — 85025 COMPLETE CBC W/AUTO DIFF WBC: CPT

## 2020-11-30 PROCEDURE — 85652 RBC SED RATE AUTOMATED: CPT

## 2020-11-30 PROCEDURE — 99212 OFFICE O/P EST SF 10 MIN: CPT

## 2020-11-30 PROCEDURE — 86140 C-REACTIVE PROTEIN: CPT

## 2020-11-30 PROCEDURE — 80053 COMPREHEN METABOLIC PANEL: CPT

## 2020-11-30 PROCEDURE — 36415 COLL VENOUS BLD VENIPUNCTURE: CPT

## 2020-11-30 RX ORDER — LIDOCAINE 40 MG/G
CREAM TOPICAL PRN
Status: DISCONTINUED | OUTPATIENT
Start: 2020-11-30 | End: 2020-12-01 | Stop reason: HOSPADM

## 2020-11-30 RX ORDER — HYDROCODONE BITARTRATE AND ACETAMINOPHEN 5; 325 MG/1; MG/1
1-2 TABLET ORAL EVERY 4 HOURS PRN
Qty: 25 TABLET | Refills: 0 | Status: SHIPPED | OUTPATIENT
Start: 2020-11-30 | End: 2020-12-07

## 2020-11-30 ASSESSMENT — PAIN DESCRIPTION - PROGRESSION
CLINICAL_PROGRESSION: NOT CHANGED
CLINICAL_PROGRESSION: NOT CHANGED

## 2020-11-30 ASSESSMENT — PAIN DESCRIPTION - DESCRIPTORS
DESCRIPTORS: ACHING;DULL
DESCRIPTORS: ACHING;DULL

## 2020-11-30 ASSESSMENT — PAIN DESCRIPTION - PAIN TYPE
TYPE: CHRONIC PAIN
TYPE: CHRONIC PAIN

## 2020-11-30 ASSESSMENT — PAIN DESCRIPTION - ORIENTATION: ORIENTATION: LEFT

## 2020-11-30 ASSESSMENT — PAIN DESCRIPTION - FREQUENCY
FREQUENCY: INTERMITTENT
FREQUENCY: INTERMITTENT

## 2020-11-30 ASSESSMENT — PAIN DESCRIPTION - ONSET
ONSET: ON-GOING
ONSET: ON-GOING

## 2020-11-30 ASSESSMENT — PAIN SCALES - GENERAL
PAINLEVEL_OUTOF10: 5
PAINLEVEL_OUTOF10: 5

## 2020-11-30 ASSESSMENT — PAIN DESCRIPTION - DIRECTION: RADIATING_TOWARDS: DENIES

## 2020-11-30 ASSESSMENT — PAIN DESCRIPTION - LOCATION: LOCATION: FOOT

## 2020-11-30 NOTE — DISCHARGE INSTR - COC
Continuity of Care Form    Patient Name: Hargis Gaucher   :  1975  MRN:  [id number]    Admit date:  2020  Discharge date:  ***    Code Status Order: Prior   Advance Directives:   Advance Care Flowsheet Documentation     Date/Time Healthcare Directive Type of Healthcare Directive Copy in [address] Agent's Name Healthcare Agent's Phone Number    [phone]  Yes, patient has an advance directive for healthcare treatment  Durable power of  for health care  Yes, copy in chart  [address]  [address]          Admitting Physician:  No admitting provider for patient encounter. PCP: Caty Neely MD    Discharging Nurse: [facility] Unit/Room#: No information available for this encounter. Discharging Unit Phone Number: ***    Emergency Contact:   Extended Emergency Contact Information  Primary Emergency Contact: Juarez Osei  Address: [address] [place] [address] Phone: 56  Relation: Spouse    Past Surgical History:  Past Surgical History:   Procedure Laterality Date    KNEE ARTHROSCOPY Left [id number]    LEFT KNEE ARTHROSCOPY , SYNOVECTOMY       OTHER SURGICAL HISTORY Left 2020    PARTIAL LEFT FOOT AMPUTATION    TOE AMPUTATION Right 2019    PARTIAL RIGHT FOOT AMPUTATION performed by Rashaun Hutton DPM at [facility] Left 2020    PARTIAL LEFT FOOT AMPUTATION performed by Rashaun Hutton DPM at [facility] TOE AMPUTATION Left 10/22/2020    PARTIAL LEFT FOOT AMPUTATION WITH GRAFT APPLICATION performed by Rashaun Hutton DPM at [address] [place] [address] EXTRACTION         Immunization History: There is no immunization history on file for this patient.     Active Problems:  Patient Active Problem List   Diagnosis Code    Hypertension I10    Uncontrolled type 2 diabetes mellitus with diabetic polyneuropathy ([facility] E11.42, E11.65    Cardiomyopathy ([facility] I42.9    Mixed hyperlipidemia E78.2    Diabetic ulcer of left forefoot associated with type 2 diabetes mellitus, with necrosis of bone ([facility]) E11.621, L97.524    Allergic rhinitis J30.9    Osteoarthritis M19.90    Acute osteomyelitis of metatarsal bone of left foot ([facility]) M86.172    Surgical wound dehiscence, initial encounter T81.31XA    Chronic osteomyelitis of left foot ([facility]) C19.839       Isolation/Infection:   Isolation          No Isolation        Patient Infection Status     Infection Onset Added Last Indicated Last Indicated By Review Planned Expiration Resolved Resolved By    None active    Resolved    COVID-19 Rule Out 10/20/20 10/20/20 10/20/20 COVID-19 (Ordered)   10/20/20 Rule-Out Test Resulted          Nurse Assessment:  Last Vital Signs: BP (!) 148/89   Pulse 87   Temp (!) 32 °F (0 °C) (Oral)   Resp 16     Last documented pain score (0-10 scale): Pain Level: 5  Last Weight:   Wt Readings from Last 1 Encounters:   20 271 lb 12.8 oz (123.3 kg)     Mental Status:  {IP PT MENTAL STATUS:}    IV Access:  { DELIO IV ACCESS:862428031}    Nursing Mobility/ADLs:  Walking   {CHP DME USGQ:455066154}  Transfer  {CHP DME ZZYU:324902956}  Bathing  {CHP DME XLSE:258013492}  Dressing  {CHP DME GPVU:628771528}  Toileting  {CHP DME HCT}  Feeding  {CHP DME XLWY:508798846}  Med Admin  {CHP DME JELENA:330513486}  Med Delivery   { DELIO MED Delivery:160239689}    Wound Care Documentation and Therapy:  Wound 20 Proximal #1, Left Great toe amp site, DFU, Reyna 4, onset 20 (Active)   Wound Image   20 1325   Wound Etiology Diabetic Reyna 4 20 1015   Dressing Status New dressing applied 20 1015   Wound Cleansed Wound cleanser 20 1015   Dressing/Treatment Other (comment) 20 1015   Offloading for Diabetic Foot Ulcers Post op shoe 20 1247   Wound Length (cm) 4.2 cm 20 1058   Wound Width (cm) 1 cm 20 1057 Wound Depth (cm) 1.5 cm 11/23/20 1058   Wound Surface Area (cm^2) 4.2 cm^2 11/23/20 1058   Change in Wound Size % (l*w) 83.06 11/23/20 1058   Wound Volume (cm^3) 6.3 cm^3 11/23/20 1058   Wound Healing % 82 11/23/20 1058   Post-Procedure Length (cm) 3.6 cm 11/16/20 1355   Post-Procedure Width (cm) 1 cm 11/16/20 1355   Post-Procedure Depth (cm) 1.7 cm 11/16/20 1355   Post-Procedure Surface Area (cm^2) 3.6 cm^2 11/16/20 1355   Post-Procedure Volume (cm^3) 6.12 cm^3 11/16/20 1355   Distance Tunneling (cm) 0 cm 11/16/20 1355   Tunneling Position ___ O'Clock 0 11/16/20 1355   Undermining Starts ___ O'Clock 0 11/16/20 1355   Undermining Ends___ O'Clock 0 11/16/20 1355   Undermining Maxium Distance (cm) 0 11/16/20 1355   Wound Assessment Pink/red;Slough 11/25/20 1015   Drainage Amount Small 11/25/20 1015   Drainage Description Serosanguinous 11/25/20 1015   Odor None 11/25/20 1015   Shanita-wound Assessment Intact 11/25/20 1015   Margins Defined edges 11/19/20 1247   Number of days: 121       Wound 11/02/20 #6, left second toe, diabetic 2,  full thickness, onset 11/2/2020 (Active)   Wound Image   11/09/20 1325   Wound Etiology Diabetic Reyna 2 11/25/20 1015   Dressing Status New dressing applied 11/25/20 1015   Wound Cleansed Wound cleanser 11/25/20 1015   Dressing/Treatment Other (comment) 11/25/20 1015   Offloading for Diabetic Foot Ulcers Post op shoe 11/19/20 1247   Wound Length (cm) 1 cm 11/23/20 1058   Wound Width (cm) 1 cm 11/23/20 1058   Wound Depth (cm) 0.1 cm 11/23/20 1058   Wound Surface Area (cm^2) 1 cm^2 11/23/20 1058   Change in Wound Size % (l*w) 69.7 11/23/20 1058   Wound Volume (cm^3) 0.1 cm^3 11/23/20 1058   Wound Healing % 85 11/23/20 1058   Post-Procedure Length (cm) 1 cm 11/16/20 1355   Post-Procedure Width (cm) 0.7 cm 11/16/20 1355   Post-Procedure Depth (cm) 0.3 cm 11/16/20 1355   Post-Procedure Surface Area (cm^2) 0.7 cm^2 11/16/20 1355   Post-Procedure Volume (cm^3) 0.21 cm^3 11/16/20 1355 Distance Tunneling (cm) 0 cm 20 1355   Tunneling Position ___ O'Clock 0 20 1355   Undermining Starts ___ O'Clock 0 20 1355   Undermining Ends___ O'Clock 0 20 1355   Undermining Maxium Distance (cm) 0 20 1355   Wound Assessment Pink/red 20 1015   Drainage Amount Small 20 1015   Drainage Description Serosanguinous 20 1015   Odor None 20 1015   Shanita-wound Assessment Hyperkeratosis (callous) 20 1247   Margins Attached edges 20 1247   Number of days: 27        Elimination:  Continence:   · Bowel: {YES / CJ:19346}  · Bladder: {YES / DI:47495}  Urinary Catheter: {Urinary Catheter:604047051}   Colostomy/Ileostomy/Ileal Conduit: {YES / VM:83587}       Date of Last BM: ***  No intake or output data in the 24 hours ending 20 1041  No intake/output data recorded.     Safety Concerns:     [address] Safety Concerns:349125742}    Impairments/Disabilities:      [address] Impairments/Disabilities:655681874}    Nutrition Therapy:  Current Nutrition Therapy:   [address] Diet List:079780159}    Routes of Feeding: {CHP DME Other Feedings:909689683}  Liquids: {Slp liquid thickness:92417}  Daily Fluid Restriction: {CHP DME Yes amt example:109116589}  Last Modified Barium Swallow with Video (Video Swallowing Test): {Done Not Done PTFP:933407131}    Treatments at the Time of Hospital Discharge:   Respiratory Treatments: ***  Oxygen Therapy:  {Therapy; copd oxygen:14296}  Ventilator:    { CC Vent JBKH:470296577}    Rehab Therapies: {THERAPEUTIC INTERVENTION:4884328908}  Weight Bearing Status/Restrictions: [address] [employer]  Weight Bearin}  Other Medical Equipment (for information only, NOT a DME order):  {EQUIPMENT:914764012}  Other Treatments: ***    Patient's personal belongings (please select all that are sent with patient):  {CHP DME Belongings:030108001}    RN SIGNATURE:  {Esignature:495948794}    CASE MANAGEMENT/SOCIAL WORK SECTION    Inpatient Status Date: ***    Readmission Risk Assessment Score:  Readmission Risk              Risk of Unplanned Readmission:        0           Discharging to Facility/ Agency   · Name:   · Address:  · Phone:  · Fax:    Dialysis Facility (if applicable)   · Name:  · Address:  · Dialysis Schedule:  · Phone:  · Fax:    / signature: {Esignature:208411530}    PHYSICIAN SECTION    Prognosis: {Prognosis:4551195994}    Condition at Discharge: [address] [place] [address] Patient Condition:743341955}    Rehab Potential (if transferring to Rehab): {Prognosis:5401377786}    Recommended Labs or Other Treatments After Discharge: ***    Physician Certification: I certify the above information and transfer of Stephania Louis  is necessary for the continuing treatment of the diagnosis listed and that he requires {Admit to Appropriate Level of Care:51125} for {GREATER/LESS:623923417} 30 days.      Update Admission H&P: {CHP DME Changes in OJUNV:859214051}    PHYSICIAN SIGNATURE:  {Esignature:906120850}

## 2020-11-30 NOTE — PROGRESS NOTES
[facility] Progress Note      Lucia Moe     : 1975    DATE OF VISIT:  2020    Subjective:     Lucia Moe is a 39 y.o. male who has a chief complaint of a diabetic ulcer located on the left foot. Has been staying off his foot. No issues with HBO or antibiotics. States doing well with wound at this time. States wound looks better at this time. Some pain at night. Mr. Roberto Gimenez has a past medical history of Acute osteomyelitis of right hallux ([address] Cellulitis of left foot, CHF (congestive heart failure) ([address] Clostridium difficile infection, Diabetic ulcer of right great toe associated with type 2 diabetes mellitus, with necrosis of bone ([address] Failed soft 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 toe amputation site, Migraine, Possible perforated tympanic membrane, Post-op hematoma of left foot, Recurrent otitis media, Tear of medial meniscus of left knee, Tobacco use, and Toe osteomyelitis, left ([address] He has a past surgical history that includes Tonsillectomy; [place] tooth extraction; Knee arthroscopy (Left, [id number]); Toe amputation (Right, 2019); other surgical history (Left, 2020); Toe amputation (Left, 2020); and Toe amputation (Left, 10/22/2020). His family history includes Diabetes in his father and mother; High Blood Pressure in his father, maternal grandmother, maternal uncle, mother, and sister; High Cholesterol in his father, maternal uncle, and mother; Stroke in his father. Mr. Roberto Gimenez reports that he quit smoking about 15 years ago. He has a 1.00 pack-year smoking history. He quit smokeless tobacco use about 15 years ago. He reports current alcohol use. He reports that he does not use drugs.     His current medication list consists of Aspirin-Acetaminophen-Caffeine, HYDROcodone-acetaminophen, Insulin Glargine (1 Unit Dial), PARoxetine, ammonium lactate, carvedilol, daptomycin, furosemide, gabapentin, glimepiride, insulin lispro, insulin lispro (1 Unit Dial), loratadine, losartan, meropenem, oxymetazoline, sildenafil, simvastatin, tiZANidine, and traZODone. Allergies: Januvia [sitagliptin]; Metformin and related; Vancomycin; and Mustard oil [allyl isothiocyanate]    Pertinent items from the review of systems are discussed in the HPI; the remainder of the ROS was reviewed and is negative. Objective:     Temp 98.2 °F (36.8 °C)   Ht 6' (1.829 m)   Wt 271 lb 12.8 oz (123.3 kg)   BMI 36.86 kg/m²   General Appearance: alert and oriented to person, place and time, well developed and well- nourished, in no acute distress  Skin: warm and dry, no rash or erythema  Head: normocephalic and atraumatic  Eyes: pupils equal, round, and reactive to light, extraocular eye movements intact, conjunctivae normal  ENT: tympanic membrane, external ear and ear canal normal bilaterally, nose without deformity, nasal mucosa and turbinates normal without polyps  Neck: supple and non-tender without mass, no thyromegaly or thyroid nodules, no cervical lymphadenopathy  Pulmonary/Chest: clear to auscultation bilaterally- no wheezes, rales or rhonchi, normal air movement, no respiratory distress  Cardiovascular: normal rate, regular rhythm, normal S1 and S2, no murmurs, rubs, clicks, or gallops, distal pulses intact, no carotid bruits  Abdomen: soft, non-tender, non-distended, normal bowel sounds, no masses or organomegaly.      Dorsalis pedis pulse left palpable  Posterior tibial pulse left palpable  Dorsalis pedis pulse right palpable  Posterior tibial pulse right palpable    Ulcer on the left 2nd digit amputation site with mild fibrotic tissue, red granulation tissue, mild serous drainage, no hyperkeratotic rim, no undermining, no tunneling, no purulence, no malodor, no eschar, no periwound maceration, mild periwound erythema, mild edema, no crepitus, no increase in skin temperature, ulcer probes to soft tissue only     Ulcer on the left hallux amputation persistence of the chronic ulcer include diabetes and poor glucose control. Sharp debridement is not indicated today, based upon the exam findings in the ulcer(s) above. Discharge plan:     Treatment in the wound care center today:  . Home treatment: good handwashing before and after any dressing changes. Cleanse ulcer with saline or soap & water before dressing change. May use Vaseline (petrolatum), Aquaphor, Aveeno, CeraVe, Cetaphil, Eucerin, Lubriderm, etc for dry skin. Dressing type for home: Iodoform and dry dressing applied to be changed on Thursday. Written discharge instructions given to patient. Offload ulcer(s) as directed. Elevate leg(s) as directed. Follow up in 1 week. Needs to control glucose levels. HBO and antibiotics as per Dr. Carly Houston. Discussed in room with Dr. Carly Houston.          Electronically signed by Jose C Macario DPM on 11/30/2020 at 11:17 AM.

## 2020-11-30 NOTE — PROGRESS NOTES
[facility] Progress Note      Quincy Shah     : 1975    DATE OF VISIT:  2020    Subjective:     Quincy Shah is a 39 y.o. male who has a chief complaint of a diabetic ulcer located on the left foot. Has been staying off his foot. No issues with HBO or antibiotics. States doing well with wound at this time. Mr. Cate Crawley has a past medical history of Acute osteomyelitis of right hallux ([address] Cellulitis of left foot, CHF (congestive heart failure) ([address] Clostridium difficile infection, Diabetic ulcer of right great toe associated with type 2 diabetes mellitus, with necrosis of bone ([address] Failed soft 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 toe amputation site, Migraine, Possible perforated tympanic membrane, Post-op hematoma of left foot, Recurrent otitis media, Tear of medial meniscus of left knee, Tobacco use, and Toe osteomyelitis, left ([address] He has a past surgical history that includes Tonsillectomy; [place] tooth extraction; Knee arthroscopy (Left, 80063339); Toe amputation (Right, 2019); other surgical history (Left, 2020); Toe amputation (Left, 2020); and Toe amputation (Left, 10/22/2020). His family history includes Diabetes in his father and mother; High Blood Pressure in his father, maternal grandmother, maternal uncle, mother, and sister; High Cholesterol in his father, maternal uncle, and mother; Stroke in his father. Mr. Cate Crawley reports that he quit smoking about 15 years ago. He has a 1.00 pack-year smoking history. He quit smokeless tobacco use about 15 years ago. He reports current alcohol use. He reports that he does not use drugs.     His current medication list consists of Aspirin-Acetaminophen-Caffeine, HYDROcodone-acetaminophen, Insulin Glargine (1 Unit Dial), PARoxetine, ammonium lactate, carvedilol, daptomycin, furosemide, gabapentin, glimepiride, insulin lispro, insulin lispro (1 Unit Dial), loratadine, losartan, meropenem, oxymetazoline, sildenafil, simvastatin, tiZANidine, and traZODone. Allergies: Januvia [sitagliptin]; Metformin and related; Vancomycin; and Mustard oil [allyl isothiocyanate]    Pertinent items from the review of systems are discussed in the HPI; the remainder of the ROS was reviewed and is negative. Objective:     Ht 6' (1.829 m)   Wt 262 lb (118.8 kg)   BMI 35.53 kg/m²   General Appearance: alert and oriented to person, place and time, well developed and well- nourished, in no acute distress  Skin: warm and dry, no rash or erythema  Head: normocephalic and atraumatic  Eyes: pupils equal, round, and reactive to light, extraocular eye movements intact, conjunctivae normal  ENT: tympanic membrane, external ear and ear canal normal bilaterally, nose without deformity, nasal mucosa and turbinates normal without polyps  Neck: supple and non-tender without mass, no thyromegaly or thyroid nodules, no cervical lymphadenopathy  Pulmonary/Chest: clear to auscultation bilaterally- no wheezes, rales or rhonchi, normal air movement, no respiratory distress  Cardiovascular: normal rate, regular rhythm, normal S1 and S2, no murmurs, rubs, clicks, or gallops, distal pulses intact, no carotid bruits  Abdomen: soft, non-tender, non-distended, normal bowel sounds, no masses or organomegaly.      Dorsalis pedis pulse left palpable  Posterior tibial pulse left palpable  Dorsalis pedis pulse right palpable  Posterior tibial pulse right palpable    Ulcer on the left 2nd digit amputation site with mild fibrotic tissue, red granulation tissue, mild serous drainage, no hyperkeratotic rim, no undermining, no tunneling, no purulence, no malodor, no eschar, no periwound maceration, mild periwound erythema, mild edema, no crepitus, no increase in skin temperature, ulcer probes to soft tissue only     Ulcer on the left hallux amputation site with minimal fibrotic tissue, red granulation tissue, mild serous drainage, no hyperkeratotic rim, no undermining, no tunneling, no purulence, no malodor, no eschar, no periwound maceration, moderate periwound erythema, mild edema, no crepitus, no increase in skin temperature, ulcer probes to soft tissue only    Today's ulcer measurements are in the wound documentation flowsheet.      Wound measurements:  Wound 11/02/20 #6, left second toe, diabetic 2,  full thickness, onset 11/2/2020-Wound Length (cm): 1 cm  Wound 07/31/20 Proximal #1, Left Great toe amp site, DFU, Reyna 4, onset 7/24/20-Wound Length (cm): 4.2 cm    Wound 11/02/20 #6, left second toe, diabetic 2,  full thickness, onset 11/2/2020-Wound Width (cm): 1 cm  Wound 07/31/20 Proximal #1, Left Great toe amp site, DFU, Reyna 4, onset 7/24/20-Wound Width (cm): 1 cm    Wound 11/02/20 #6, left second toe, diabetic 2,  full thickness, onset 11/2/2020-Wound Depth (cm): 0.1 cm  Wound 07/31/20 Proximal #1, Left Great toe amp site, DFU, Reyna 4, onset 7/24/20-Wound Depth (cm): 1.5 cm    LABS  Lab Results   Component Value Date    LABA1C 7.8 10/26/2020     Xray left foot - postop changes    Culture -   Pseudomonas aeruginosa (1)     Antibiotic  Interpretation  SIMONE  Status     cefepime  Resistant  >16  mcg/mL      ciprofloxacin  Intermediate  2  mcg/mL      gentamicin  Sensitive  <=4  mcg/mL      meropenem  Sensitive  <=1  mcg/mL      piperacillin-tazobactam  Sensitive  <=16  mcg/mL      tobramycin  Sensitive  <=4  mcg/mL      Enterobacter cloacae complex (2)     Antibiotic  Interpretation  SIMONE  Status     amoxicillin-clavulanate  Resistant  >16/8  mcg/mL      ampicillin  Resistant  >16  mcg/mL      ceFAZolin  Resistant  >16  mcg/mL      cefepime  Sensitive  <=2  mcg/mL      cefTRIAXone  Sensitive  <=1  mcg/mL      cefuroxime  Sensitive  8  mcg/mL      ciprofloxacin  Sensitive  <=1  mcg/mL      ertapenem  Sensitive  <=0.5  mcg/mL      gentamicin  Sensitive  <=4  mcg/mL      meropenem  Sensitive  <=1  mcg/mL      piperacillin-tazobactam  Sensitive  <=16 mcg/mL      trimethoprim-sulfamethoxazole  Sensitive  <=2/38  mcg/mL      Alcaligenes faecalis (3)     Antibiotic  Interpretation  SIMONE  Status     cefepime  Sensitive  4  mcg/mL      cefTRIAXone  Sensitive  <=1  mcg/mL      ciprofloxacin  Sensitive  <=1  mcg/mL      gentamicin  Resistant  <=4  mcg/mL      meropenem  Sensitive  <=1  mcg/mL      piperacillin-tazobactam  Sensitive  <=16  mcg/mL      tobramycin  Sensitive  <=4  mcg/mL      trimethoprim-sulfamethoxazole  Sensitive  <=2/38  mcg/mL          Path - chronic and acute osteomyelitis      Assessment:     Patient Active Problem List   Diagnosis Code    Hypertension I10    Uncontrolled type 2 diabetes mellitus with diabetic polyneuropathy ([facility] E11.42, E11.65    Cardiomyopathy ([facility] I42.9    Mixed hyperlipidemia E78.2    Diabetic ulcer of left forefoot associated with type 2 diabetes mellitus, with necrosis of bone ([facility]) E11.621, L97.524    Allergic rhinitis J30.9    Osteoarthritis M19.90    Acute osteomyelitis of metatarsal bone of left foot ([facility]) M86.172    Surgical wound dehiscence, initial encounter T81.31XA    Chronic osteomyelitis of left foot ([facility]) Q67.302       Assessment of today's active condition(s): osteomyelitis left foot, diabetic foot ulcer left hallux amputation, diabetes mellitus. Factors contributing to occurrence and/or persistence of the chronic ulcer include diabetes and poor glucose control. Sharp debridement is not indicated today, based upon the exam findings in the ulcer(s) above. Discharge plan:     Treatment in the wound care center today: Wound 11/02/20 #6, left second toe, diabetic 2,  full thickness, onset 11/2/2020-Dressing/Treatment: Other (comment)(betadine,4x4,cast padding,kerlix,coban)  Wound 07/31/20 Proximal #1, Left Great toe amp site, DFU, Reyna 4, onset 7/24/20-Dressing/Treatment: (betadine,4x4,cast padding,kerlix,coban). Home treatment: good handwashing before and after any dressing changes.  Cleanse ulcer with saline or soap & water before dressing change. May use Vaseline (petrolatum), Aquaphor, Aveeno, CeraVe, Cetaphil, Eucerin, Lubriderm, etc for dry skin. Dressing type for home: Iodoform and dry dressing applied to be changed on Thursday. Written discharge instructions given to patient. Offload ulcer(s) as directed. Elevate leg(s) as directed. Follow up in 1 week. Needs to control glucose levels. HBO and antibiotics as per Dr. Emmy Lubin. Discussed in room with Dr. Emmy Lubin.            Electronically signed by Tim Carcamo DPM on 11/30/2020 at 2:14 PM.

## 2020-11-30 NOTE — PLAN OF CARE
Pt to the [facility] for follow up appointment. Wound measuring smaller. Pt to continue with biweekly dressing changes. Pt to continue with HBOT and IV antibiotics. Pt to follow up in the [facility] in 1 week with Dr Sharad Castro. Discharge instructions reviewed with patient, all questions answered, copy given to patient. Dressings were applied to all wounds per M.D. Instructions at this visit.

## 2020-12-01 VITALS
DIASTOLIC BLOOD PRESSURE: 89 MMHG | SYSTOLIC BLOOD PRESSURE: 148 MMHG | TEMPERATURE: 98.2 F | RESPIRATION RATE: 16 BRPM | HEART RATE: 87 BPM

## 2020-12-01 PROBLEM — M86.172 ACUTE OSTEOMYELITIS OF METATARSAL BONE OF LEFT FOOT (HCC): Status: RESOLVED | Noted: 2020-10-26 | Resolved: 2020-12-01

## 2020-12-01 LAB — C-REACTIVE PROTEIN: 13.7 MG/L (ref 0–5.1)

## 2020-12-01 NOTE — PROGRESS NOTES
[address]  Hyperbaric Oxygen    Ashlee Eason     : 1975    DATE OF VISIT:  2020    Subjective     Ashlee Eason is a 39 y.o. 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 He presents to the [facility] today for hyperbaric oxygen treatment of his Reyna 3 DFU, which is refractory to standard therapy for 30 days. Patient denies fever. Patient denies nausea, vomiting or diarrhea; no ear troubles and no other new complaints; no fears or anxiety regarding treatment today. Objective     Vitals:    20 0815 20 1012   BP: 119/74 (!) 148/89   Pulse: 93 87   Resp: 16 16   Temp: 97.8 °F (36.6 °C) (!) 32 °F (0 °C)   TempSrc: Oral Oral       General:  Alert, cooperative, no distress. Ears: External otic canals are within acceptable limits. Teed grade 0 on the right and 1 on the left pre-treatment. Teed grade 1 on the right and 1 on the left post-treatment. Lungs:  Clear to auscultation bilaterally. Ulcer: See wound-care and ID notes from today. Recent Labs     20  0813 20  1017   POCGLU 251* 163*       Assessment     Ashlee Eason is a 39 y.o. male who presented to the [facility] Department today for hyperbaric oxygen treatment #19 of 30 for the diagnosis as stated above.  Treatment given at 2 VINCENT for 90 minutes, with no air breaks. Total Treatment Time (min): 108 today, including compression, 100% oxygen at pressure, air breaks if applicable, and decompression. Patient Active Problem List   Diagnosis Code    Hypertension I10    Uncontrolled type 2 diabetes mellitus with diabetic polyneuropathy ([facility]) E11.42, E11.65    Cardiomyopathy ([facility] I42.9    Mixed hyperlipidemia E78.2    Diabetic ulcer of left forefoot associated with type 2 diabetes mellitus, with necrosis of bone ([facility]) E11.621, L97.524    Allergic rhinitis J30.9    Osteoarthritis M19.90    Acute osteomyelitis of metatarsal bone of left foot ([facility]) M86.172    Surgical wound dehiscence, initial encounter T81.31XA    [facility] Q89.589       In my clinical judgement, ongoing HBO therapy is necessary at this time, given a threat to patient function, limb or life from the current condition. By way of an overall summary of his wound-care treatment and progress to this point --    - He's had a left JOEL of 1.09 with palpable pedal pulses, no other clinical signs of large-vessel ischemia. - All necrotic bone was resected in the OR, necrotic soft tissue is debrided weekly as needed, and is down to a minimum now. - Soft tissue infection resolved; he's completed about 4.5 of 6 weeks of post-op IV Abx for residual acute and chronic osteo, with inflammatory markers nearly normalized last week. Surface bioburden has been addressed with a variety of topical antimicrobials. - Edema is still present at the foot, but I don't think an impediment to wound healing. He IS elevating periodically. - Wound bed moisture balance is good, with no maceration.  - Tissue growth is being addressed largely by HBOT; I think we could consider a brief course of NPWT or a short series of cellular-tissue products to further speed that up, but defer to Dr. Tree Fraser on that.   - No significant pain now, and what little pain he has is not an impediment to wound healing.  - No real direct offloading concerns, with the wounds being dorsal and distal, not plantar. - Systemically, things are generally in good order, with no smoking, no signs of malnutrition, no immune compromising medications, and overall improved glucose control this ilia (Hgb A1c from 11.7% to 7.8%), despite some ongoing elevated glucoses at times. HBOT tolerance thus far has been very good, with no claustrophobia, no treatment-related hypoglycemia, no cardiopulmonary symptoms, only mild and generally asymptomatic barotauma, and some not-unexpected myopia reported. In addition to the improvement in signs of infection and health of the wound bed tissue (virtually 100% red and granular now), the wound size has also improved nicely in the last month or so:        Overall progress with HBOT thus far has been encouraging, but with the residual 2.6 cm of depth in the main first ray wound, I think the likelihood of further improvement with HBOT is definitely greater than the likelihood of serious harm, and I believe we should continue with treatment, at least up to the 30 dives that were initially planned. Mr. Lisset Hodges is in agreement. Plan     1. Hyperbaric Oxygen - Morris Spine A Demaris tolerated the treatment well today without complications. Continue HBO treatment as outlined above. 2. Other - see ID note from today re: wound exam, Abx plan, labs, etc.    I was present on these premises and immediately available to furnish assistance & direction throughout the procedure.      -- Electronically signed by Maritza Hess MD on 11/30/2020 at 8:07 PM

## 2020-12-01 NOTE — PROGRESS NOTES
[facility] ([place]) Progress Note    Meryle Locks     : 1975    DATE OF VISIT:  2020    Subjective:     Meryle Locks is a 39 y.o. male who has a diabetic and  dehisced surgical ulcer located on the foot (left , forefoot). Current complaint of pain in this ulcer? yes. Quality of pain: aching, burning and throbbing  Timing: intermittent and stable  Severity: mild  Associated Signs/Symptoms: swelling, drainage (light, clear) and numbness  Other significant symptoms or pertinent ulcer history: feeling well overall, no F/C/D, tolerating Abx, no N/V/D, no sore throat or mouth, no drug rash or pruritus. Thinks that his PICC might be leaking a bit, is scheduled to have the dressing changed today. Tolerating HBO well in general, apart from some myopia; minor barotrauma on exam, no symptoms. Additional ulcer(s) noted? no. And the 2nd toe amp site is nearly healed. Mr. Cassy Husain has a past medical history of Acute osteomyelitis of right hallux ([address] Cellulitis of left foot, CHF (congestive heart failure) ([address] Clostridium difficile infection, Diabetic ulcer of right great toe associated with type 2 diabetes mellitus, with necrosis of bone ([address] Failed soft 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 toe amputation site, Migraine, Possible perforated tympanic membrane, Post-op hematoma of left foot, Recurrent otitis media, Tear of medial meniscus of left knee, Tobacco use, and Toe osteomyelitis, left ([address] He has a past surgical history that includes Tonsillectomy; [place] tooth extraction; Knee arthroscopy (Left, [id number]); Toe amputation (Right, 2019); other surgical history (Left, 2020); Toe amputation (Left, 2020); and Toe amputation (Left, 10/22/2020).     His family history includes Diabetes in his father and mother; High Blood Pressure in his father, maternal grandmother, maternal uncle, mother, and sister; High Cholesterol in his father, maternal uncle, and mother; Stroke in his father. Mr. Criselda Ahumada reports that he quit smoking about 15 years ago. He has a 1.00 pack-year smoking history. He quit smokeless tobacco use about 15 years ago. He reports current alcohol use. He reports that he does not use drugs. His current medication list consists of Aspirin-Acetaminophen-Caffeine, HYDROcodone-acetaminophen, Insulin Glargine (1 Unit Dial), PARoxetine, ammonium lactate, carvedilol, furosemide, gabapentin, glimepiride, insulin lispro, insulin lispro (1 Unit Dial), loratadine, losartan, oxymetazoline, sildenafil, simvastatin, tiZANidine, and traZODone. He's also had about 4.5 weeks of post-op IV daptomycin and meropenem at this point. Allergies: Januvia [sitagliptin]; Metformin and related; Vancomycin; and Mustard oil [allyl isothiocyanate]    Pertinent items from the review of systems are discussed in the HPI; the remainder of the ROS was reviewed and is negative. Objective:     Vitals:    11/30/20 1032   Temp: 98.2 °F (36.8 °C)   Weight: 271 lb 12.8 oz (123.3 kg)   Height: 6' (1.829 m)     From HBOT just before his wound-care visit -- HR 87, RR 16, /89    Constitutional:  well-developed, well-nourished, overweight, NAD  Psychiatric:  oriented to person, place and time; mood and affect appropriate for the situation   Eyes:  pupils equal, round and reactive to light; sclerae anicteric, conjunctivae not pale  ENT: no thrush or oral ulcers, mucous membranes moist  Abd: soft, NT, ND, good BS  Cardiovascular:  bilateral pedal pulses palpable, foot warm, good cap refill; moderate left pedal lymphedema; PICC site not inflamed or tender, no palpable cord  Lymphatic:  no inguinal or popliteal adenopathy, no angitis or cellulitis  Musculoskeletal:  no clubbing, cyanosis or petechiae; RLE and LLE with no gross effusions, joint misalignment or acute arthritis  Shanita-ulcer skin: indurated, pink and warm.   Ulcer(s): red, granular, just a bit of fibrin and biofilm, serous exudate, no palpable bone; 2nd toe amp site nearly healed; 1st ray site still with a good bit of proximal depth, but no significant deep necrosis seen. Photos also saved in electronic chart. Today's Wound Measurements, per RN documentation:  Wound 11/02/20 #6, left second toe, diabetic 2,  full thickness, onset 11/2/2020-Wound Length (cm): 0.5 cm  Wound 07/31/20 Proximal #1, Left Great toe amp site, DFU, Reyna 4, onset 7/24/20-Wound Length (cm): 3 cm    Wound 11/02/20 #6, left second toe, diabetic 2,  full thickness, onset 11/2/2020-Wound Width (cm): 0.3 cm  Wound 07/31/20 Proximal #1, Left Great toe amp site, DFU, Reyna 4, onset 7/24/20-Wound Width (cm): 0.8 cm    Wound 11/02/20 #6, left second toe, diabetic 2,  full thickness, onset 11/2/2020-Wound Depth (cm): 0.2 cm  Wound 07/31/20 Proximal #1, Left Great toe amp site, DFU, Reyna 4, onset 7/24/20-Wound Depth (cm): 2.6 cm  ______________________________    Lab Results   Component Value Date    LABALBU 4.5 11/30/2020     Lab Results   Component Value Date    CREATININE 0.6 (L) 11/30/2020     Lab Results   Component Value Date    HGB 14.2 11/30/2020     Lab Results   Component Value Date    LABA1C 7.8 10/26/2020       Recent lab trends (with pre-op Augmentin, OR on Oct 22, then IV Abx starting on about Oct 27) --     Oct 26 --         Creat 0.7, CK 26, cRP 107.4, alb 3.9, alk phos 169, WBC 11.2, Hgb 13.1, plts 302k, 200 Eos, .     Nov 2 --           Creat < 0.5, CK 60, cRP 32.4, alb 4.1, alk phos 185, WBC 14.7, Hgb 13.0, plts 392k, 300 Eos, ESR 79.      Nov 9 --           Creat 0.6, CK 48, cRP 26.4, alb 4.2, alk phos 159, WBC 9.4, Hgb 13.3, plts 240k, 200 Eos, ESR 57.     Nov 16 --         Creat 0.6, CK 42, cRP 8.1, alb 4.2, alk phos 148, WBC 7.4, Hgb 13.4, plts 216k, 300 Eos, ESR 37.      Nov 23 --         Creat 0.6, CK 87, cRP 6.0, alb 4.5, alk phos 141, WBC 8.9, Hgb 14.3, plts 214k, 500 Eos, ESR 28.      Nov 30 -- Creat 0.6, CK 60, cRP 13.7, alb 4.5, alk phos 151 (ALT 49), WBC 7.2, Hgb 14.2, plst 197k, 300 Eos, ESR 23. Assessment:     Patient Active Problem List   Diagnosis Code    Hypertension I10    Uncontrolled type 2 diabetes mellitus with diabetic polyneuropathy ([facility] E11.42, E11.65    [facility] I42.9    Mixed hyperlipidemia E78.2    Diabetic ulcer of left forefoot associated with type 2 diabetes mellitus, with necrosis of bone (HCC) E11.621, L97.524    Allergic rhinitis J30.9    Osteoarthritis M19.90    Surgical wound dehiscence, initial encounter T81.31XA    Chronic osteomyelitis of left foot ([facility] [facility] M86.672       Assessment of today's active condition(s): uncontrolled DM2, neuropathy, no signs of PAD; Reyna 3 DFU with recent 2nd toe amp and partial 1st metatarsal resection, immediate post-op wound dehiscence I think related to ambulation and hematomas; no residual soft tissue infection, on Abx for presumed residual osteo; minor LFT changes asymptomatic and likely from Abx (as opposed to an alk phos bump from progressive osteo); cRP did pop up just a bit this week, but other inflammatory markers are good, and the foot continues to look better. Factors contributing to occurrence and/or persistence of the chronic ulcer include lymphedema, diabetes, poor glucose control, shear force, obesity, decreased tissue oxygenation and non-adherence. Medical necessity of today's visit is shown by the above documentation. Sharp debridement decisions per Dr. KELLY Sribu. Discharge plan:     Treatment in the wound care center today, per RN documentation: Wound 11/02/20 #6, left second toe, diabetic 2,  full thickness, onset 11/2/2020-Dressing/Treatment: Other (comment)(betadine 4x4 ABD kerlix coban)  Wound 07/31/20 Proximal #1, Left Great toe amp site, DFU, Reyna 4, onset 7/24/20-Dressing/Treatment: (betadine 4x4 ABD kerlix coban). Local care, offloading and analgesia per Dr. KELLY Sribu.     Continue IV daptomycin and meropenem, likely just another 10 days. Will watch closely for allergic manifestations, Candidiasis, myopathy, Cdiff, PICC complications, etc; weekly labs ordered, weekly PICC dressings being done. I'm not convinced the PICC itself is damaged or truly leaking -- if infusate is obviously leaking out, let me know, and we can change his PICC over a wire for the last bit of his treatment. Continue daily HBOT, pushing toward 30 treatments, hopefully no more than that, as long as the depth of the first ray wound improves. Written discharge instructions given to patient. Follow up in 1 week to see Dr. Lisa Cruz and me, daily for HBOT.     Electronically signed by Wilton Miller MD on 12/1/2020 at 9:37 AM.

## 2020-12-02 ENCOUNTER — HOSPITAL ENCOUNTER (OUTPATIENT)
Dept: HYPERBARIC MEDICINE | Age: 45
Discharge: HOME | End: 2020-12-02
Payer: COMMERCIAL

## 2020-12-02 VITALS
SYSTOLIC BLOOD PRESSURE: 132 MMHG | DIASTOLIC BLOOD PRESSURE: 80 MMHG | HEART RATE: 94 BPM | TEMPERATURE: 98.5 F | RESPIRATION RATE: 16 BRPM

## 2020-12-02 LAB
GLUCOSE BLD-MCNC: 158 MG/DL (ref 70–99)
GLUCOSE BLD-MCNC: 203 MG/DL (ref 70–99)
PERFORMED ON: ABNORMAL
PERFORMED ON: ABNORMAL

## 2020-12-02 PROCEDURE — 99183 HYPERBARIC OXYGEN THERAPY: CPT | Performed by: EMERGENCY MEDICINE

## 2020-12-02 PROCEDURE — G0277 HBOT, FULL BODY CHAMBER, 30M: HCPCS

## 2020-12-02 ASSESSMENT — PAIN SCALES - GENERAL
PAINLEVEL_OUTOF10: 0
PAINLEVEL_OUTOF10: 0

## 2020-12-02 NOTE — PROGRESS NOTES
above. Treatment given at 2 VINCENT for 90 minutes, with no air breaks. Total Treatment Time (min): 108 today, including compression, 100% oxygen at pressure, air breaks if applicable, and decompression. Patient Active Problem List   Diagnosis Code    Hypertension I10    Uncontrolled type 2 diabetes mellitus with diabetic polyneuropathy (HCC) E11.42, E11.65    Cardiomyopathy ([facility] I42.9    Mixed hyperlipidemia E78.2    Diabetic ulcer of left forefoot associated with type 2 diabetes mellitus, with necrosis of bone (HCC) E11.621, L97.524    Allergic rhinitis J30.9    Osteoarthritis M19.90    Surgical wound dehiscence, initial encounter T81.31XA    Chronic osteomyelitis of left foot ([facility] [facility] X19.679       In my clinical judgement, ongoing HBO therapy is necessary at this time, given a threat to patient function, limb or life from the current condition. Adjunctive Rx, objective weekly progress and goals of Rx will periodically be updated, on Mondays. Plan     1. Hyperbaric Oxygen - Berta Osei tolerated the treatment well today without complications. Continue HBO treatment as outlined above. 2. Other -     I was present on these premises and immediately available to furnish assistance & direction throughout the procedure.      -- Electronically signed by Ronak Morales MD on 12/2/2020 at 2:20 PM

## 2020-12-02 NOTE — PLAN OF CARE
Pt to the [facility] for HBOT. Assessment completed and patient cleared for treatment. PICC line changed prior to treatment, biopatch removed. Pt to 2.0 VINCENT at a rate of 1.5 psi. Pt to pressure without complaints. Pt resting and watching tv. Nurse at chamber side and will continue to monitor.

## 2020-12-03 ENCOUNTER — HOSPITAL ENCOUNTER (OUTPATIENT)
Dept: HYPERBARIC MEDICINE | Age: 45
Discharge: HOME | End: 2020-12-03
Payer: COMMERCIAL

## 2020-12-03 VITALS
HEART RATE: 83 BPM | TEMPERATURE: 98.1 F | SYSTOLIC BLOOD PRESSURE: 120 MMHG | DIASTOLIC BLOOD PRESSURE: 81 MMHG | RESPIRATION RATE: 18 BRPM

## 2020-12-03 LAB
GLUCOSE BLD-MCNC: 162 MG/DL (ref 70–99)
GLUCOSE BLD-MCNC: 206 MG/DL (ref 70–99)
PERFORMED ON: ABNORMAL
PERFORMED ON: ABNORMAL

## 2020-12-03 PROCEDURE — G0277 HBOT, FULL BODY CHAMBER, 30M: HCPCS

## 2020-12-03 PROCEDURE — 99183 HYPERBARIC OXYGEN THERAPY: CPT | Performed by: INTERNAL MEDICINE

## 2020-12-03 ASSESSMENT — PAIN SCALES - GENERAL
PAINLEVEL_OUTOF10: 0
PAINLEVEL_OUTOF10: 0

## 2020-12-03 NOTE — PROGRESS NOTES
[address]  Hyperbaric Oxygen    Justin López     : 1975    DATE OF VISIT:  12/3/2020    Subjective     Justin López is a 39 y.o. male who  has a past medical history of Acute osteomyelitis of right hallux ([facility] (2019), Cellulitis of left foot (2020), [facility] ([facility]), Clostridium difficile infection (2016), Diabetic ulcer of right great toe associated with type 2 diabetes mellitus, with necrosis of bone ([facility] [facility] (2019), Failed soft 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 toe amputation site (10/26/2020), Migraine, Possible perforated tympanic membrane, Post-op hematoma of left foot (2020), Recurrent otitis media, Tear of medial meniscus of left knee, Tobacco use, and Toe osteomyelitis, left ([facility] ([facility]). He presents to the [facility] today for hyperbaric oxygen treatment of his Reyna 3 DFU, which is refractory to standard therapy for 30 days. Patient denies fever. Patient denies nausea, vomiting or diarrhea; no ear troubles and no other new complaints; no fears or anxiety regarding treatment today. Objective     Vitals:    20 0815 20 1015   BP: 121/83 120/81   Pulse: 92 83   Resp: 18 18   Temp: 97.9 °F (36.6 °C) 98.1 °F (36.7 °C)   TempSrc: Oral Oral       General:  Alert, cooperative, no distress. Ears: External otic canals are within acceptable limits. Teed grade 0 on the right and 0 on the left pre-treatment. Teed grade 1 on the right and 1 on the left post-treatment. Lungs:  Clear to auscultation bilaterally. Ulcer: Not examined today in HBO, if applicable. Recent Labs     20  0810 20  1026 20  0811 20  1018   POCGLU 203* 158* 206* 162*       Assessment     Justin López is a 39 y.o. male who presented to the [facility] today for hyperbaric oxygen treatment #21 of 30 for the diagnosis as stated above. Treatment given at 2 VINCENT for 90 minutes, with no air breaks. Total Treatment Time (min): 108 today, including compression, 100% oxygen at pressure, air breaks if applicable, and decompression. Patient Active Problem List   Diagnosis Code    Hypertension I10    Uncontrolled type 2 diabetes mellitus with diabetic polyneuropathy ([facility]CC) E11.42, E11.65    Cardiomyopathy ([facility] I42.9    Mixed hyperlipidemia E78.2    Diabetic ulcer of left forefoot associated with type 2 diabetes mellitus, with necrosis of bone (HCC) E11.621, L97.524    Allergic rhinitis J30.9    Osteoarthritis M19.90    Surgical wound dehiscence, initial encounter T81.31XA    [facility] I40.392       In my clinical judgement, ongoing HBO therapy is necessary at this time, given a threat to patient function, limb or life from the current condition. Adjunctive Rx, objective weekly progress and goals of Rx will periodically be updated, on Mondays. Plan     1. Hyperbaric Oxygen - Edd Grantnam CHAU Osei tolerated the treatment well today without complications. Continue HBO treatment as outlined above. 2. Other -     I was present on these premises and immediately available to furnish assistance & direction throughout the procedure.      -- Electronically signed by Donato Gamez MD on 12/3/2020 at 12:14 PM

## 2020-12-03 NOTE — PLAN OF CARE
Patient seen for HBOT treatment  21 / 30    today. Patient assessment complete and cleared for HBOT. Patient was compressed in HBO chamber to 2.0 VINCENT at 1.5 psi/min. Patient is tolerating treatment well and is currently resting comfortably and watching television. HBO RN at chamber side, will continue to monitor.

## 2020-12-04 ENCOUNTER — HOSPITAL ENCOUNTER (OUTPATIENT)
Dept: HYPERBARIC MEDICINE | Age: 45
Discharge: HOME | End: 2020-12-04
Payer: COMMERCIAL

## 2020-12-04 VITALS
HEART RATE: 85 BPM | TEMPERATURE: 98 F | RESPIRATION RATE: 16 BRPM | DIASTOLIC BLOOD PRESSURE: 93 MMHG | SYSTOLIC BLOOD PRESSURE: 131 MMHG

## 2020-12-04 LAB
GLUCOSE BLD-MCNC: 161 MG/DL (ref 70–99)
GLUCOSE BLD-MCNC: 188 MG/DL (ref 70–99)
PERFORMED ON: ABNORMAL
PERFORMED ON: ABNORMAL

## 2020-12-04 PROCEDURE — 99183 HYPERBARIC OXYGEN THERAPY: CPT | Performed by: INTERNAL MEDICINE

## 2020-12-04 PROCEDURE — G0277 HBOT, FULL BODY CHAMBER, 30M: HCPCS

## 2020-12-04 RX ORDER — GLIMEPIRIDE 4 MG/1
4 TABLET ORAL
Qty: 90 TABLET | Refills: 0 | Status: SHIPPED | OUTPATIENT
Start: 2020-12-04 | End: 2021-06-14

## 2020-12-04 RX ORDER — GLUCOSAMINE HCL/CHONDROITIN SU 500-400 MG
CAPSULE ORAL
Qty: 100 STRIP | Refills: 3 | Status: SHIPPED | OUTPATIENT
Start: 2020-12-04

## 2020-12-04 ASSESSMENT — PAIN SCALES - GENERAL
PAINLEVEL_OUTOF10: 0
PAINLEVEL_OUTOF10: 0

## 2020-12-04 NOTE — PLAN OF CARE
Patient seen for HBOT treatment  22 / 30    today. Patient assessment WNL- AVSS- lungs CTA - TEED=1 bilat - no pain/pressure issues during compression- FSBS 188- c/o blurry vision -Has PICC line - cont IV antibx. cleared for HBOT. Patient was compressed in HBO chamber to 2.0 VINCENT at 1.5 psi/min x 90 min treatment w/o air breaks. Patient is tolerating treatment well and is currently resting comfortably and watching television. HBO RN at chamber side, will continue to monitor.

## 2020-12-04 NOTE — TELEPHONE ENCOUNTER
----- Message from Alanna Mcintyre sent at 12/4/2020 12:41 PM EST -----  Contact: Orlando/alejandra [phone]  Patient needs prescription for EZ ultra fine pen needles and Contour EZ test strips.  Thank you tsh

## 2020-12-06 NOTE — PROGRESS NOTES
[address]  Hyperbaric Oxygen    Merlin Mayans     : 1975    DATE OF VISIT:  2020    Subjective     Merlin Mayans is a 39 y.o. male who  has a past medical history of Acute osteomyelitis of right hallux ([facility] [facility] (2019), Cellulitis of left foot (2020), CHF (congestive heart failure) ([facility] [facility] (2014), Clostridium difficile infection (2016), Diabetic ulcer of right great toe associated with type 2 diabetes mellitus, with necrosis of bone ([facility] [facility] (2019), Failed soft 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 toe amputation site (10/26/2020), Migraine, Possible perforated tympanic membrane, Post-op hematoma of left foot (2020), Recurrent otitis media, Tear of medial meniscus of left knee, Tobacco use, and Toe osteomyelitis, left ([facility] [facility] (2020). He presents to the [facility] today for hyperbaric oxygen treatment of his Reyna 3 DFU, which is refractory to standard therapy for 30 days. Patient denies fever. Patient denies nausea, vomiting or diarrhea; no ear troubles and no other new complaints; no fears or anxiety regarding treatment today. Objective     Vitals:    20 0805 20 1015   BP: 106/73 (!) 131/93   Pulse: 87 85   Resp: 16 16   Temp: 97.7 °F (36.5 °C) 98 °F (36.7 °C)   TempSrc: Oral Oral       General:  Alert, cooperative, no distress. Ears: External otic canals are within acceptable limits. Teed grade 1 on the right and 1 on the left pre-treatment. Teed grade 1 on the right and 1 on the left post-treatment. Lungs:  Clear to auscultation bilaterally. Ulcer: Not examined today in HBO, if applicable. Recent Labs     20  0812 20  1014   POCGLU 188* 161*       Assessment     Merlin Mayans is a 39 y.o. male who presented to the [facility] today for hyperbaric oxygen treatment #22 of 30 for the diagnosis as stated above.  Treatment given at 2 VINCENT for 90 minutes, with no air breaks. Total Treatment Time (min): 110 today, including compression, 100% oxygen at pressure, air breaks if applicable, and decompression. Patient Active Problem List   Diagnosis Code    Hypertension I10    Uncontrolled type 2 diabetes mellitus with diabetic polyneuropathy (HCC) E11.42, E11.65    [facility] I42.9    Mixed hyperlipidemia E78.2    Diabetic ulcer of left forefoot associated with type 2 diabetes mellitus, with necrosis of bone (HCC) E11.621, L97.524    Allergic rhinitis J30.9    Osteoarthritis M19.90    Surgical wound dehiscence, initial encounter T81.31XA    Chronic osteomyelitis of left foot ([facility] N74.408       In my clinical judgement, ongoing HBO therapy is necessary at this time, given a threat to patient function, limb or life from the current condition. Adjunctive Rx, objective weekly progress and goals of Rx will periodically be updated, on Mondays. Plan     1. Hyperbaric Oxygen - Mary Pretty A Demaris tolerated the treatment well today without complications. Continue HBO treatment as outlined above. 2. Other -     I was present on these premises and immediately available to furnish assistance & direction throughout the procedure.      -- Electronically signed by James Kwon MD on 12/6/2020 at 10:49 AM

## 2020-12-07 ENCOUNTER — HOSPITAL ENCOUNTER (OUTPATIENT)
Dept: HYPERBARIC MEDICINE | Age: 45
Discharge: HOME | End: 2020-12-07
Payer: COMMERCIAL

## 2020-12-07 ENCOUNTER — HOSPITAL ENCOUNTER (OUTPATIENT)
Dept: WOUND CARE | Age: 45
Discharge: HOME | End: 2020-12-07
Payer: COMMERCIAL

## 2020-12-07 VITALS
TEMPERATURE: 98 F | HEART RATE: 96 BPM | SYSTOLIC BLOOD PRESSURE: 141 MMHG | BODY MASS INDEX: 37.11 KG/M2 | DIASTOLIC BLOOD PRESSURE: 85 MMHG | WEIGHT: 274 LBS | RESPIRATION RATE: 16 BRPM | HEIGHT: 72 IN

## 2020-12-07 VITALS
SYSTOLIC BLOOD PRESSURE: 162 MMHG | HEART RATE: 82 BPM | RESPIRATION RATE: 16 BRPM | TEMPERATURE: 98.5 F | DIASTOLIC BLOOD PRESSURE: 106 MMHG

## 2020-12-07 LAB
GLUCOSE BLD-MCNC: 185 MG/DL (ref 70–99)
GLUCOSE BLD-MCNC: 214 MG/DL (ref 70–99)
PERFORMED ON: ABNORMAL
PERFORMED ON: ABNORMAL

## 2020-12-07 PROCEDURE — G0277 HBOT, FULL BODY CHAMBER, 30M: HCPCS

## 2020-12-07 PROCEDURE — 99212 OFFICE O/P EST SF 10 MIN: CPT

## 2020-12-07 PROCEDURE — 99213 OFFICE O/P EST LOW 20 MIN: CPT | Performed by: INTERNAL MEDICINE

## 2020-12-07 PROCEDURE — 99183 HYPERBARIC OXYGEN THERAPY: CPT | Performed by: INTERNAL MEDICINE

## 2020-12-07 RX ORDER — LIDOCAINE 40 MG/G
CREAM TOPICAL PRN
Status: DISCONTINUED | OUTPATIENT
Start: 2020-12-07 | End: 2020-12-08 | Stop reason: HOSPADM

## 2020-12-07 ASSESSMENT — PAIN SCALES - GENERAL
PAINLEVEL_OUTOF10: 0

## 2020-12-07 NOTE — PROGRESS NOTES
[address] [facility] Progress Note      Paola Jeffrey     : 1975    DATE OF VISIT:  2020    Subjective:     Paola Jeffrey is a 39 y.o. male who has a chief complaint of a diabetic ulcer located on the left foot. Has been staying off his foot. No issues with HBO or antibiotics. States doing well with wound at this time. States wound looks better at this time. No pain at this time. Mr. Sekou Staples has a past medical history of Acute osteomyelitis of right hallux ([address] Cellulitis of left foot, CHF (congestive heart failure) ([address] Clostridium difficile infection, Diabetic ulcer of right great toe associated with type 2 diabetes mellitus, with necrosis of bone ([address] Failed soft 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 toe amputation site, Migraine, Possible perforated tympanic membrane, Post-op hematoma of left foot, Recurrent otitis media, Tear of medial meniscus of left knee, Tobacco use, and Toe osteomyelitis, left ([address] He has a past surgical history that includes Tonsillectomy; [place] tooth extraction; Knee arthroscopy (Left, [id number]); Toe amputation (Right, 2019); other surgical history (Left, 2020); Toe amputation (Left, 2020); and Toe amputation (Left, 10/22/2020). His family history includes Diabetes in his father and mother; High Blood Pressure in his father, maternal grandmother, maternal uncle, mother, and sister; High Cholesterol in his father, maternal uncle, and mother; Stroke in his father. Mr. Sekou Staples reports that he quit smoking about 15 years ago. He has a 1.00 pack-year smoking history. He quit smokeless tobacco use about 15 years ago. He reports current alcohol use. He reports that he does not use drugs.     His current medication list consists of Aspirin-Acetaminophen-Caffeine, HYDROcodone-acetaminophen, Insulin Glargine (1 Unit Dial), Insulin Pen Needle, PARoxetine, ammonium lactate, blood glucose test strips, carvedilol, furosemide, gabapentin, glimepiride, insulin lispro, insulin lispro (1 Unit Dial), loratadine, losartan, oxymetazoline, sildenafil, simvastatin, tiZANidine, and traZODone. Allergies: Januvia [sitagliptin]; Metformin and related; Vancomycin; and Mustard oil [allyl isothiocyanate]    Pertinent items from the review of systems are discussed in the HPI; the remainder of the ROS was reviewed and is negative. Objective:     BP (!) 141/85   Pulse 96   Temp 98 °F (36.7 °C) (Oral)   Resp 16   Ht 6' (1.829 m)   Wt 274 lb (124.3 kg)   BMI 37.16 kg/m²   General Appearance: alert and oriented to person, place and time, well developed and well- nourished, in no acute distress  Skin: warm and dry, no rash or erythema  Head: normocephalic and atraumatic  Eyes: pupils equal, round, and reactive to light, extraocular eye movements intact, conjunctivae normal  ENT: tympanic membrane, external ear and ear canal normal bilaterally, nose without deformity, nasal mucosa and turbinates normal without polyps  Neck: supple and non-tender without mass, no thyromegaly or thyroid nodules, no cervical lymphadenopathy  Pulmonary/Chest: clear to auscultation bilaterally- no wheezes, rales or rhonchi, normal air movement, no respiratory distress  Cardiovascular: normal rate, regular rhythm, normal S1 and S2, no murmurs, rubs, clicks, or gallops, distal pulses intact, no carotid bruits  Abdomen: soft, non-tender, non-distended, normal bowel sounds, no masses or organomegaly.      Dorsalis pedis pulse left palpable  Posterior tibial pulse left palpable  Dorsalis pedis pulse right palpable  Posterior tibial pulse right palpable    Ulcer on the left 2nd digit amputation site with mild fibrotic tissue, red granulation tissue, mild serous drainage, no hyperkeratotic rim, no undermining, no tunneling, no purulence, no malodor, no eschar, no periwound maceration, mild periwound erythema, mild edema, no crepitus, no increase in skin temperature, ulcer probes to soft tissue only     Ulcer on the left hallux amputation site with minimal fibrotic tissue, red granulation tissue, mild serous drainage, no hyperkeratotic rim, no undermining, no tunneling, no purulence, no malodor, no eschar, no periwound maceration, moderate periwound erythema, mild edema, no crepitus, no increase in skin temperature, ulcer probes to soft tissue only    Today's ulcer measurements are in the wound documentation flowsheet. Wound measurements:  Wound 07/31/20 Proximal #1, Left Great toe amp site, DFU, Reyna 4, onset 7/24/20-Wound Length (cm): 2.9 cm  Wound 11/02/20 #6, left second toe, diabetic 2,  full thickness, onset 11/2/2020-Wound Length (cm): 0.6 cm    Wound 07/31/20 Proximal #1, Left Great toe amp site, DFU, Reyna 4, onset 7/24/20-Wound Width (cm): 0.7 cm  Wound 11/02/20 #6, left second toe, diabetic 2,  full thickness, onset 11/2/2020-Wound Width (cm): 0.3 cm    Wound 07/31/20 Proximal #1, Left Great toe amp site, DFU, Reyna 4, onset 7/24/20-Wound Depth (cm): 2 cm  Wound 11/02/20 #6, left second toe, diabetic 2,  full thickness, onset 11/2/2020-Wound Depth (cm): 0.3 cm    LABS  Lab Results   Component Value Date    LABA1C 7.8 10/26/2020     Xray left foot - postop changes      Assessment:     Patient Active Problem List   Diagnosis Code    Hypertension I10    Uncontrolled type 2 diabetes mellitus with diabetic polyneuropathy ([facility]) E11.42, E11.65    Cardiomyopathy ([facility] I42.9    Mixed hyperlipidemia E78.2    Diabetic ulcer of left forefoot associated with type 2 diabetes mellitus, with necrosis of bone ([facility]) E11.621, L97.524    Allergic rhinitis J30.9    Osteoarthritis M19.90    Surgical wound dehiscence, initial encounter T81.31XA    Chronic osteomyelitis of left foot ([facility]) W19.579       Assessment of today's active condition(s): osteomyelitis left foot, diabetic foot ulcer left hallux amputation, diabetes mellitus.  Factors contributing to occurrence and/or

## 2020-12-08 ENCOUNTER — HOSPITAL ENCOUNTER (OUTPATIENT)
Dept: HYPERBARIC MEDICINE | Age: 45
Discharge: HOME | End: 2020-12-08
Payer: COMMERCIAL

## 2020-12-08 VITALS
HEART RATE: 87 BPM | TEMPERATURE: 98.2 F | RESPIRATION RATE: 18 BRPM | DIASTOLIC BLOOD PRESSURE: 85 MMHG | SYSTOLIC BLOOD PRESSURE: 129 MMHG

## 2020-12-08 LAB
A/G RATIO: 1.8 (ref 1.1–2.2)
ALBUMIN SERPL-MCNC: 4.4 G/DL (ref 3.4–5)
ALP BLD-CCNC: 135 U/L (ref 40–129)
ALT SERPL-CCNC: 41 U/L (ref 10–40)
ANION GAP SERPL CALCULATED.3IONS-SCNC: 13 MMOL/L (ref 3–16)
AST SERPL-CCNC: 25 U/L (ref 15–37)
BASOPHILS ABSOLUTE: 0.1 K/UL (ref 0–0.2)
BASOPHILS RELATIVE PERCENT: 1.4 %
BILIRUB SERPL-MCNC: 3 MG/DL (ref 0–1)
BUN BLDV-MCNC: 20 MG/DL (ref 7–20)
C-REACTIVE PROTEIN: 7.7 MG/L (ref 0–5.1)
CALCIUM SERPL-MCNC: 9.5 MG/DL (ref 8.3–10.6)
CHLORIDE BLD-SCNC: 98 MMOL/L (ref 99–110)
CO2: 24 MMOL/L (ref 21–32)
CREAT SERPL-MCNC: 0.7 MG/DL (ref 0.9–1.3)
EOSINOPHILS ABSOLUTE: 0.2 K/UL (ref 0–0.6)
EOSINOPHILS RELATIVE PERCENT: 2.5 %
GFR AFRICAN AMERICAN: >60
GFR NON-AFRICAN AMERICAN: >60
GLOBULIN: 2.5 G/DL
GLUCOSE BLD-MCNC: 148 MG/DL (ref 70–99)
GLUCOSE BLD-MCNC: 171 MG/DL (ref 70–99)
GLUCOSE BLD-MCNC: 184 MG/DL (ref 70–99)
HCT VFR BLD CALC: 41.1 % (ref 40.5–52.5)
HEMOGLOBIN: 14.3 G/DL (ref 13.5–17.5)
LYMPHOCYTES ABSOLUTE: 2.6 K/UL (ref 1–5.1)
LYMPHOCYTES RELATIVE PERCENT: 31 %
MCH RBC QN AUTO: 31.4 PG (ref 26–34)
MCHC RBC AUTO-ENTMCNC: 34.8 G/DL (ref 31–36)
MCV RBC AUTO: 90.1 FL (ref 80–100)
MONOCYTES ABSOLUTE: 0.9 K/UL (ref 0–1.3)
MONOCYTES RELATIVE PERCENT: 10.5 %
NEUTROPHILS ABSOLUTE: 4.6 K/UL (ref 1.7–7.7)
NEUTROPHILS RELATIVE PERCENT: 54.6 %
PDW BLD-RTO: 14.5 % (ref 12.4–15.4)
PERFORMED ON: ABNORMAL
PERFORMED ON: ABNORMAL
PLATELET # BLD: 208 K/UL (ref 135–450)
PMV BLD AUTO: 7.3 FL (ref 5–10.5)
POTASSIUM SERPL-SCNC: 4.3 MMOL/L (ref 3.5–5.1)
RBC # BLD: 4.56 M/UL (ref 4.2–5.9)
SEDIMENTATION RATE, ERYTHROCYTE: 26 MM/HR (ref 0–15)
SODIUM BLD-SCNC: 135 MMOL/L (ref 136–145)
TOTAL CK: 44 U/L (ref 39–308)
TOTAL PROTEIN: 6.9 G/DL (ref 6.4–8.2)
WBC # BLD: 8.3 K/UL (ref 4–11)

## 2020-12-08 PROCEDURE — 86140 C-REACTIVE PROTEIN: CPT

## 2020-12-08 PROCEDURE — 36415 COLL VENOUS BLD VENIPUNCTURE: CPT

## 2020-12-08 PROCEDURE — 85652 RBC SED RATE AUTOMATED: CPT

## 2020-12-08 PROCEDURE — G0277 HBOT, FULL BODY CHAMBER, 30M: HCPCS

## 2020-12-08 PROCEDURE — 85025 COMPLETE CBC W/AUTO DIFF WBC: CPT

## 2020-12-08 PROCEDURE — 99183 HYPERBARIC OXYGEN THERAPY: CPT | Performed by: INTERNAL MEDICINE

## 2020-12-08 PROCEDURE — 82550 ASSAY OF CK (CPK): CPT

## 2020-12-08 PROCEDURE — 80053 COMPREHEN METABOLIC PANEL: CPT

## 2020-12-08 ASSESSMENT — PAIN SCALES - GENERAL
PAINLEVEL_OUTOF10: 0
PAINLEVEL_OUTOF10: 0

## 2020-12-08 NOTE — PROGRESS NOTES
88 [facility] ([facility]) Progress Note    Brayan Dale     : 1975    DATE OF VISIT:  2020    Subjective:     Brayan Dale is a 39 y.o. male who has a diabetic and  dehisced surgical ulcer located on the foot (left , forefoot). Current complaint of pain in this ulcer? yes. Quality of pain: aching and burning  Timing: intermittent and stable  Severity: mild  Associated Signs/Symptoms: swelling, drainage (moderate, serous to serosanguinous), numbness and tingling  Other significant symptoms or pertinent ulcer history: feeling well overall, less pain, stable swelling, no redness. No F/C/D. Tolerating IV Abx well, no sore throat or mouth, no rash or pruritus, no PICC discomfort, no N/V/D. Tolerating HBOT well apart from the myopia, and some largely asymptomatic middle ear barotrauma. Additional ulcer(s) noted? no.  2nd toe amp site nearly healed, but not quite. Mr. Toya Barrera has a past medical history of Acute osteomyelitis of right hallux ([address] Cellulitis of left foot, CHF (congestive heart failure) ([address] Clostridium difficile infection, Diabetic ulcer of right great toe associated with type 2 diabetes mellitus, with necrosis of bone ([address] Failed soft 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 toe amputation site, Migraine, Possible perforated tympanic membrane, Post-op hematoma of left foot, Recurrent otitis media, Tear of medial meniscus of left knee, Tobacco use, and Toe osteomyelitis, left ([address] He has a past surgical history that includes Tonsillectomy; [place] tooth extraction; Knee arthroscopy (Left, [id number]); Toe amputation (Right, 2019); other surgical history (Left, 2020); Toe amputation (Left, 2020); and Toe amputation (Left, 10/22/2020).     His family history includes Diabetes in his father and mother; High Blood Pressure in his father, maternal grandmother, maternal uncle, mother, and sister; High Cholesterol in his father, maternal uncle, and mother; Stroke in his father. Mr. Heike Jones reports that he quit smoking about 15 years ago. He has a 1.00 pack-year smoking history. He quit smokeless tobacco use about 15 years ago. He reports current alcohol use. He reports that he does not use drugs. His current medication list consists of Aspirin-Acetaminophen-Caffeine, Insulin Glargine (1 Unit Dial), Insulin Pen Needle, PARoxetine, ammonium lactate, blood glucose test strips, carvedilol, furosemide, gabapentin, glimepiride, insulin lispro, insulin lispro (1 Unit Dial), loratadine, losartan, oxymetazoline, sildenafil, simvastatin, tiZANidine, and traZODone. He's also had nearly 6 weeks of post-op daptomycin and meropenem at this point. Allergies: Januvia [sitagliptin]; Metformin and related; Vancomycin; and Mustard oil [allyl isothiocyanate]    Pertinent items from the review of systems are discussed in the HPI; the remainder of the ROS was reviewed and is negative. Objective:     Vitals:    12/07/20 1030   BP: (!) 141/85   Pulse: 96   Resp: 16   Temp: 98 °F (36.7 °C)   TempSrc: Oral   Weight: 274 lb (124.3 kg)   Height: 6' (1.829 m)       Constitutional:  well-developed, well-nourished, overweight, NAD  Psychiatric:  oriented to person, place and time; mood and affect appropriate for the situation   Eyes:  pupils equal, round and reactive to light; sclerae anicteric, conjunctivae not pale  ENT: no thrush or oral ulcers, mucous membranes moist  Abd: soft, NT, ND, good BS  Cardiovascular:  bilateral pedal pulses palpable, feet warm, good cap refill; mild-mod left pedal lymphedema; PICC site benign, no palpable cord, no exit site inflammation  Lymphatic:  no inguinal or popliteal adenopathy, no angitis or cellulitis  Musculoskeletal:  no clubbing, cyanosis or petechiae; RLE and LLE with no gross effusions, joint misalignment or acute arthritis  Shanita-ulcer skin: indurated, pink, warm, dry and hyperkeratotic.   Ulcer(s): smaller, red, granular, a bit of superficial fibrinous debris, biofilm, still a bit of proximal depth at the first ray wound, but no probing to bone. Photos also saved in electronic chart.     Today's Wound Measurements, per RN documentation:  Wound 07/31/20 Proximal #1, Left Great toe amp site, DFU, Reyna 4, onset 7/24/20-Wound Length (cm): 2.9 cm  Wound 11/02/20 #6, left second toe, diabetic 2,  full thickness, onset 11/2/2020-Wound Length (cm): 0.6 cm    Wound 07/31/20 Proximal #1, Left Great toe amp site, DFU, Reyna 4, onset 7/24/20-Wound Width (cm): 0.7 cm  Wound 11/02/20 #6, left second toe, diabetic 2,  full thickness, onset 11/2/2020-Wound Width (cm): 0.3 cm    Wound 07/31/20 Proximal #1, Left Great toe amp site, DFU, Reyna 4, onset 7/24/20-Wound Depth (cm): 2 cm  Wound 11/02/20 #6, left second toe, diabetic 2,  full thickness, onset 11/2/2020-Wound Depth (cm): 0.3 cm  ______________________________    Lab Results   Component Value Date    LABALBU 4.4 12/08/2020     Lab Results   Component Value Date    CREATININE 0.7 (L) 12/08/2020     Lab Results   Component Value Date    HGB 14.3 12/08/2020     Lab Results   Component Value Date    LABA1C 7.8 10/26/2020       Recent lab trends (with pre-op Augmentin, OR on Oct 22, then IV Abx starting on about Oct 27) --     Oct 26 --         Creat 0.7, CK 26, cRP 107.4, alb 3.9, alk phos 169, WBC 11.2, Hgb 13.1, plts 302k, 200 Eos, .     Nov 2 --           Creat < 0.5, CK 60, cRP 32.4, alb 4.1, alk phos 185, WBC 14.7, Hgb 13.0, plts 392k, 300 Eos, ESR 79.      Nov 9 --           Creat 0.6, CK 48, cRP 26.4, alb 4.2, alk phos 159, WBC 9.4, Hgb 13.3, plts 240k, 200 Eos, ESR 57.     Nov 16 --         Creat 0.6, CK 42, cRP 8.1, alb 4.2, alk phos 148, WBC 7.4, Hgb 13.4, plts 216k, 300 Eos, ESR 37.      Nov 23 --         Creat 0.6, CK 87, cRP 6.0, alb 4.5, alk phos 141, WBC 8.9, Hgb 14.3, plts 214k, 500 Eos, ESR 28.      Nov 30 --         Creat 0.6, CK 60, cRP 13.7, alb 4.5, alk phos 151 (ALT 49), WBC 7.2, Hgb 14.2, plst 197k, 300 Eos, ESR 23. Dec 8 -- Creat 0.7, CK 44, cRP 7.7, alb 4.4, alk phos 135 (ALT 41), WBC 8.3, Hgb 14.3, plts 208k, 200 Eos, ESR 26. Assessment:     Patient Active Problem List   Diagnosis Code    Hypertension I10    Uncontrolled type 2 diabetes mellitus with diabetic polyneuropathy ([facility]) E11.42, E11.65    Cardiomyopathy ([facility] I42.9    Mixed hyperlipidemia E78.2    Diabetic ulcer of left forefoot associated with type 2 diabetes mellitus, with necrosis of bone ([facility]) E11.621, L97.524    Allergic rhinitis J30.9    Osteoarthritis M19.90    Surgical wound dehiscence, initial encounter T81.31XA    Chronic osteomyelitis of left foot ([facility]) F86.314       Assessment of today's active condition(s): uncontrolled DM2, neuropathy, no strong signs of PAD; Hx left hallux gangrene, urgent open hallux amp, nonhealing surgical wound, later development of new osteomyelitis in the 1st met head and 2nd toe, more surgery about 6 weeks ago, early flap compromise and surgical dehiscence. Soft tissue infection resolved, no signs of large-vessel ischemia, wounds improving overall, residual osteomyelitis likely resolved with near-normalization of inflammatory markers. Still a bit of proximal depth of the 1st ray wound, which we are hoping will continue to improve with HBOT. Factors contributing to occurrence and/or persistence of the chronic ulcer include lymphedema, diabetes, poor glucose control, shear force, obesity and decreased tissue oxygenation. Medical necessity of today's visit is shown by the above documentation. Sharp debridement per Dr. Adele Rodrigez.      Discharge plan:     Treatment in the wound care center today, per RN documentation: Wound 07/31/20 Proximal #1, Left Great toe amp site, DFU, Reyna 4, onset 7/24/20-Dressing/Treatment: (btadine, iodofrm packing, gauze, kerlix, Coban)  Wound 11/02/20 #6, left second toe, diabetic 2,  full thickness, onset 11/2/2020-Dressing/Treatment: (betadine, dry dressing, kerlix, coban). Stop IV ABx at 6 weeks, will remove PICC here this week, no additional labs needed from me. Continue HBOT, at least up to the 30 planned treatments, but might go longer depending on first ray wound depth next week. Continue to work on glucose control, offloading, elevation at times. Will make plans for diabetic shoe and custom insert once healed.      Electronically signed by Renetta Barbosa MD on 12/8/2020 at 5:23 PM.

## 2020-12-08 NOTE — PROGRESS NOTES
[address]  Hyperbaric Oxygen    Justin López     : 1975    DATE OF VISIT:  2020    Subjective     Justin López is a 39 y.o. male who  has a past medical history of Acute osteomyelitis of right hallux ([facility] (2019), Cellulitis of left foot (2020), CHF (congestive heart failure) ([facility] (2014), Clostridium difficile infection (2016), Diabetic ulcer of right great toe associated with type 2 diabetes mellitus, with necrosis of bone ([facility] (2019), Failed soft 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 toe amputation site (10/26/2020), Migraine, Possible perforated tympanic membrane, Post-op hematoma of left foot (2020), Recurrent otitis media, Tear of medial meniscus of left knee, Tobacco use, and Toe osteomyelitis, left ([facility] (2020). He presents to the [facility] today for hyperbaric oxygen treatment of his Reyna 3 DFU, which is refractory to standard therapy for 30 days. Patient denies fever. Patient denies nausea, vomiting or diarrhea; no ear troubles and no other new complaints; no fears or anxiety regarding treatment today. Objective     Vitals:    20 0802 20 1012   BP: 98/65 129/85   Pulse: 91 87   Resp: 18 18   Temp: 97.8 °F (36.6 °C) 98.2 °F (36.8 °C)   TempSrc: Oral Oral       General:  Alert, cooperative, no distress. Ears: External otic canals are within acceptable limits. Teed grade 0 on the right and 0 on the left pre-treatment. Teed grade 1 on the right and 1 on the left post-treatment. Lungs:  Clear to auscultation bilaterally. Ulcer: Not examined today in HBO, if applicable. Recent Labs     20  0817 20  1015 20  0808 20  1014   POCGLU 214* 185* 184* 171*       Assessment     Justin López is a 39 y.o. male who presented to the [facility] today for hyperbaric oxygen treatment #24 of 30 for the diagnosis as stated above.  Treatment given at 2 VINCENT for 90 minutes, with no air breaks. Total Treatment Time (min): 110 today, including compression, 100% oxygen at pressure, air breaks if applicable, and decompression. Patient Active Problem List   Diagnosis Code    Hypertension I10    Uncontrolled type 2 diabetes mellitus with diabetic polyneuropathy ([facility]) E11.42, E11.65    Cardiomyopathy ([facility] I42.9    Mixed hyperlipidemia E78.2    Diabetic ulcer of left forefoot associated with type 2 diabetes mellitus, with necrosis of bone (HCC) E11.621, L97.524    Allergic rhinitis J30.9    Osteoarthritis M19.90    Surgical wound dehiscence, initial encounter T81.31XA    Chronic osteomyelitis of left foot ([facility] G09.287       In my clinical judgement, ongoing HBO therapy is necessary at this time, given a threat to patient function, limb or life from the current condition. Adjunctive Rx, objective weekly progress and goals of Rx will periodically be updated, on Mondays. Plan     1. Hyperbaric Oxygen - [facility] tolerated the treatment well today without complications. Continue HBO treatment as outlined above. 2. Other - home-care was not able to draw his labs and change his PICC dressing yesterday, because his car broke down after he left here. They called today to say that they weren't able to draw labs and change his PICC dressing today, because they didn't have a nurse available, so we'll draw labs and change his PICC dressing here, and then if his labs are basically normalized, will stop Abx and pull PICC tomorrow. If inflammatory markers still up, will consider extending Abx, repeating XR, etc.    I was present on these premises and immediately available to furnish assistance & direction throughout the procedure.      -- Electronically signed by Maritza Hess MD on 12/8/2020 at 11:04 AM

## 2020-12-08 NOTE — PROGRESS NOTES
[address]  Hyperbaric Oxygen    Paola Jeffrey     : 1975    DATE OF VISIT:  2020    Subjective     Paola Jeffrey is a 39 y.o. male who  has a past medical history of Acute osteomyelitis of right hallux ([facility] (2019), Cellulitis of left foot (2020), CHF (congestive heart failure) ([facility] (2014), Clostridium difficile infection (2016), Diabetic ulcer of right great toe associated with type 2 diabetes mellitus, with necrosis of bone ([facility] (2019), Failed soft 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 toe amputation site (10/26/2020), Migraine, Possible perforated tympanic membrane, Post-op hematoma of left foot (2020), Recurrent otitis media, Tear of medial meniscus of left knee, Tobacco use, and Toe osteomyelitis, left ([facility] (2020). He presents to the [facility] today for hyperbaric oxygen treatment of his Reyna 3 DFU, which is refractory to standard therapy for 30 days. Patient denies fever. Patient denies nausea, vomiting or diarrhea; no ear troubles and no other new complaints; no fears or anxiety regarding treatment today. Objective     Vitals:    20 0815 20 1011   BP: (!) 141/85 (!) 162/106   Pulse: 96 82   Resp: 16 16   Temp: 98 °F (36.7 °C) 98.5 °F (36.9 °C)   TempSrc: Oral Oral       General:  Alert, cooperative, no distress. Ears: External otic canals are within acceptable limits. Teed grade 0 on the right and 1 on the left pre-treatment. Teed grade 1 on the right and 1 on the left post-treatment. Lungs:  Clear to auscultation bilaterally. Ulcer: See wound-care and ID notes from today. Recent Labs     20  0817 20  1015 20  0808   POCGLU 214* 185* 184*       Assessment     Paola Jeffrey is a 39 y.o. male who presented to the [facility] today for hyperbaric oxygen treatment #23 of 30 for the diagnosis as stated above.  Treatment given at 2 VINCENT for 90 minutes, with no air breaks. Total Treatment Time (min): 108 today, including compression, 100% oxygen at pressure, air breaks if applicable, and decompression. Patient Active Problem List   Diagnosis Code    Hypertension I10    Uncontrolled type 2 diabetes mellitus with diabetic polyneuropathy (HCC) E11.42, E11.65    Cardiomyopathy ([facility] I42.9    Mixed hyperlipidemia E78.2    Diabetic ulcer of left forefoot associated with type 2 diabetes mellitus, with necrosis of bone (HCC) E11.621, L97.524    Allergic rhinitis J30.9    Osteoarthritis M19.90    Surgical wound dehiscence, initial encounter T81.31XA    Chronic [facility]omyelitis of left foot ([facility] K51.745       In my clinical judgement, ongoing HBO therapy is necessary at this time, given a threat to patient function, limb or life from the current condition. Plan     1. Hyperbaric Oxygen - [facility] Osei tolerated the treatment well today without complications. Continue HBO treatment as outlined above. 2. Other - see ID note for exam, labs (once drawn by home-care), ongoing Abx plan, etc.    I was present on these premises and immediately available to furnish assistance & direction throughout the procedure.      -- Electronically signed by Esdras Bautista MD on 12/8/2020 at 8:47 AM

## 2020-12-08 NOTE — PLAN OF CARE
Patient seen for HBOT treatment  24 / 30   today. Patient assessment complete and cleared for HBOT. Patient was compressed in HBO chamber to 2.0 VINCENT at 1.5 psi/min. Patient is tolerating treatment well and is currently resting comfortably and watching television. HBO RN at chamber side, will continue to monitor.

## 2020-12-09 ENCOUNTER — HOSPITAL ENCOUNTER (OUTPATIENT)
Dept: HYPERBARIC MEDICINE | Age: 45
Discharge: HOME | End: 2020-12-09
Payer: COMMERCIAL

## 2020-12-09 VITALS
SYSTOLIC BLOOD PRESSURE: 182 MMHG | DIASTOLIC BLOOD PRESSURE: 97 MMHG | HEART RATE: 84 BPM | TEMPERATURE: 98.3 F | RESPIRATION RATE: 16 BRPM

## 2020-12-09 LAB
GLUCOSE BLD-MCNC: 153 MG/DL (ref 70–99)
GLUCOSE BLD-MCNC: 201 MG/DL (ref 70–99)
PERFORMED ON: ABNORMAL
PERFORMED ON: ABNORMAL

## 2020-12-09 PROCEDURE — 99183 HYPERBARIC OXYGEN THERAPY: CPT | Performed by: EMERGENCY MEDICINE

## 2020-12-09 PROCEDURE — G0277 HBOT, FULL BODY CHAMBER, 30M: HCPCS

## 2020-12-09 ASSESSMENT — PAIN SCALES - GENERAL
PAINLEVEL_OUTOF10: 0
PAINLEVEL_OUTOF10: 0

## 2020-12-09 NOTE — PROGRESS NOTES
[address]  Hyperbaric Oxygen    Carol Bhandari     : 1975    DATE OF VISIT:  2020    Subjective     Carol Bhandari is a 39 y.o. 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 ([facility]). He presents to the [facility] [place] today for hyperbaric oxygen treatment of his diabetic foot ulcer  , which is refractory to standard therapy for 30 days. Patient denies fever. Patient denies nausea, vomiting or diarrhea; no ear troubles and no other new complaints; no fears or anxiety regarding treatment today. Objective     There were no vitals filed for this visit. General:  Alert, cooperative, no distress. Ears: External otic canals are within acceptable limits. Teed grade 0 on the right and 0 on the left pre-treatment. Teed grade 1 on the right and 1 on the left post-treatment. Lungs:  Clear to auscultation bilaterally. Ulcer: Not examined today in HBO, if applicable. Recent Labs     20  0817 20  1015 20  0808 20  1014   POCGLU 214* 185* 184* 171*       Assessment     Carol Bhandari is a 39 y.o. male who presented to the [facility] [place] today for hyperbaric oxygen treatment #25 of 30 for the diagnosis as stated above. Treatment given at 2 VINCENT for 90 minutes, with no air breaks.    today, including compression, 100% oxygen at pressure, air breaks if applicable, and decompression. Patient Active Problem List   Diagnosis Code    Hypertension I10    Uncontrolled type 2 diabetes mellitus with diabetic polyneuropathy (HCC) E11.42, E11.65    Cardiomyopathy ([facility] I42.9    Mixed hyperlipidemia E78.2    Diabetic ulcer of left forefoot associated with type 2 diabetes mellitus, with necrosis of bone (HCC) E11.621, L97.524    Allergic rhinitis J30.9    Osteoarthritis M19.90    Surgical wound dehiscence, initial encounter T81.31XA    [facility]el[facility] of [facility] foot [facility] T04.537       In my clinical judgement, ongoing HBO therapy is necessary at this time, given a threat to patient function, limb or life from the current condition. Adjunctive Rx, objective weekly progress and goals of Rx will periodically be updated, on Mondays. Plan     1. Hyperbaric Oxygen - Esequiel Mamyles CHAU Osei tolerated the treatment well today without complications. Continue HBO treatment as outlined above. 2. Other -     I was present on these premises and immediately available to furnish assistance & direction throughout the procedure.      -- Electronically signed by Marcelo Cardenas MD on 12/9/2020 at 2:44 PM

## 2020-12-09 NOTE — PLAN OF CARE
Pt to the [facility] for HBOT. Assessment completed and patient cleared for treatment. Pt to 2.0 VINCENT at a rate of 1.5 psi. Pt to pressure without complaints and is watching tv. Nurse at chamber side and will continue to monitor.

## 2020-12-10 ENCOUNTER — TELEPHONE (OUTPATIENT)
Dept: INTERNAL MEDICINE CLINIC | Age: 45
End: 2020-12-10

## 2020-12-10 ENCOUNTER — HOSPITAL ENCOUNTER (OUTPATIENT)
Dept: HYPERBARIC MEDICINE | Age: 45
Discharge: HOME | End: 2020-12-10
Payer: COMMERCIAL

## 2020-12-10 VITALS
DIASTOLIC BLOOD PRESSURE: 86 MMHG | TEMPERATURE: 97.9 F | HEART RATE: 87 BPM | RESPIRATION RATE: 16 BRPM | SYSTOLIC BLOOD PRESSURE: 128 MMHG

## 2020-12-10 LAB
GLUCOSE BLD-MCNC: 141 MG/DL (ref 70–99)
GLUCOSE BLD-MCNC: 195 MG/DL (ref 70–99)
PERFORMED ON: ABNORMAL
PERFORMED ON: ABNORMAL

## 2020-12-10 PROCEDURE — 99183 HYPERBARIC OXYGEN THERAPY: CPT | Performed by: EMERGENCY MEDICINE

## 2020-12-10 PROCEDURE — G0277 HBOT, FULL BODY CHAMBER, 30M: HCPCS

## 2020-12-10 RX ORDER — GLUCOSAMINE HCL/CHONDROITIN SU 500-400 MG
CAPSULE ORAL
Qty: 100 STRIP | Refills: 0 | Status: SHIPPED | OUTPATIENT
Start: 2020-12-10 | End: 2021-03-05

## 2020-12-10 RX ORDER — BLOOD-GLUCOSE METER
EACH MISCELLANEOUS
Qty: 1 KIT | Refills: 0 | Status: SHIPPED | OUTPATIENT
Start: 2020-12-10

## 2020-12-10 ASSESSMENT — PAIN SCALES - GENERAL
PAINLEVEL_OUTOF10: 0
PAINLEVEL_OUTOF10: 0

## 2020-12-10 NOTE — PLAN OF CARE
Patient seen for HBOT treatment  26 / 30    today. Patient assessment complete and cleared for HBOT. Patient was compressed in HBO chamber to 2.0 VINCENT at 1.5psi/min. Patient is tolerating treatment well and is currently resting comfortably and watching television. HBO RN at chamber side, will continue to monitor.

## 2020-12-10 NOTE — TELEPHONE ENCOUNTER
Pt informed to call insurance to see what brand of strips and meter is covered. Pt will let us know so we can call in new meter and strips.

## 2020-12-10 NOTE — TELEPHONE ENCOUNTER
----- Message from Sherri Pleitez sent at 12/10/2020  3:15 PM EST -----  Contact: [place]/spouse [phone]  Spouse called insurance company and they will cover Clint's test strips at [facility] with a prescription for Contour Next EZ test strips for a 90 day supply.

## 2020-12-10 NOTE — PROGRESS NOTES
[facility]  Hyperbaric Oxygen Therapy   Progress Note      NAME: Hargis Gaucher   MEDICAL RECORD NUMBER:  [id number]  AGE: 39 y.o. GENDER: male  : 1975  EPISODE DATE:  12/10/2020     Subjective     HBO Treatment Number: 26 out of Total Treatments: 30    HBO Diagnosis:     Indications: Lower Extremity Diabetic Wound ___(site)  Leeann Lashon Thompsonn 3     Safety checks performed prior to treatment. See doc flowsheets for documentation. Objective           Recent Labs     12/10/20  0813 12/10/20  1017   POCGLU 141* 195*           Glucose Testing  Blood Glucose POCT: (!) 195     Pre treatment Vital Signs       Temp: 97.8 °F (36.6 °C)     Pulse: 96     Resp: 16   BP: 134/85       Post treatment Vital Signs  Temp: 97.9 °F (36.6 °C)  Pulse: 87  Resp: 16  BP: 128/86        Assessment        Physical Exam:  General Appearance:  alert and oriented to person, place and time, well-developed and well nourished and in no acute distress    Pre Tympanic Membrane Assessment:  Right grade 1, Left grade 1    Post Tympanic Membrane Assessment:  Right: Grade I  Left: Grade I    Pulmonary/Chest:  clear to auscultation bilaterally- no wheezes, rales or rhonchi, normal air movement, no respiratory distress    Cardiovascular:  regular rate and rhythm        Plan        Patient placed in a fully body Monoplace Chamber #: [id number]       Treatment Start Time: 0824     Pressure Reached Time: 0834  VINCENT : 2  Number of Air Breaks:  Treatment Status: No Air break      Decompression Time: 1004   Treatment End Time: 1012  Total Treatment Time (min): 108    Symptoms Noted During Treatment: None      Adverse Event: no      I was present on these premises and immediately available to furnish assistance & direction throughout the procedure. Hargis Gaucher is a 39 y.o. male  did successfully complete today's hyperbaric oxygen treatment at [facility] and HBO therapy.     In my clinical judgement, ongoing HBO therapy is  necessary at this time, given a threat to patient function, limb or life from the current condition. Supervision and attendance of Hyperbaric Oxygen Therapy provided. Continue HBO treatment as outlined in the treatment plan. Hyperbaric Oxygen: Ricky Osei tolerated Treatment Number: 26 well today without complications.      Electronically signed by Jp Hoffmann MD on 12/10/2020 at 2:38 PM

## 2020-12-10 NOTE — TELEPHONE ENCOUNTER
----- Message from Seth Womack sent at 12/10/2020  8:46 AM EST -----  Contact: [phone]  [facility] states insurance is needing a prior authorization for Test Strips for [employer] Glucose Meter. [address].  Vinicio Gunter

## 2020-12-11 ENCOUNTER — HOSPITAL ENCOUNTER (OUTPATIENT)
Dept: HYPERBARIC MEDICINE | Age: 45
Discharge: HOME | End: 2020-12-11
Payer: COMMERCIAL

## 2020-12-11 VITALS
TEMPERATURE: 97.8 F | RESPIRATION RATE: 16 BRPM | HEART RATE: 86 BPM | SYSTOLIC BLOOD PRESSURE: 137 MMHG | DIASTOLIC BLOOD PRESSURE: 96 MMHG

## 2020-12-11 LAB
GLUCOSE BLD-MCNC: 185 MG/DL (ref 70–99)
GLUCOSE BLD-MCNC: 258 MG/DL (ref 70–99)
PERFORMED ON: ABNORMAL
PERFORMED ON: ABNORMAL

## 2020-12-11 PROCEDURE — G0277 HBOT, FULL BODY CHAMBER, 30M: HCPCS

## 2020-12-11 PROCEDURE — 99183 HYPERBARIC OXYGEN THERAPY: CPT | Performed by: INTERNAL MEDICINE

## 2020-12-11 ASSESSMENT — PAIN SCALES - GENERAL
PAINLEVEL_OUTOF10: 0
PAINLEVEL_OUTOF10: 0

## 2020-12-11 NOTE — PLAN OF CARE
Patient seen for HBOT treatment  27 / 30    today. Patient assessment complete - blood sugar 258, pt stated he had honey nut cheerios for breakfast and also took 19units of insluin and 4mg Amaryl @ 0730 - MD aware and cleared for HBOT. Patient was compressed in HBO chamber to 2.0 VINCENT at 1.5psi/min. Patient is tolerating treatment well and is currently resting comfortably and watching television. HBO RN at chamber side, will continue to monitor.

## 2020-12-13 NOTE — PROGRESS NOTES
[address]  Hyperbaric Oxygen    Jayme Wan     : 1975    DATE OF VISIT:  2020    Subjective     Jayme Wan is a 39 y.o. 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 He presents to the [facility] today for hyperbaric oxygen treatment of his Reyna 3 DFU, which is refractory to standard therapy for 30 days. Patient denies fever. Patient denies nausea, vomiting or diarrhea; no ear troubles and no other new complaints; no fears or anxiety regarding treatment today. Objective     Vitals:    20 0918 20 1130   BP: 113/67 (!) 137/96   Pulse: 88 86   Resp: 18 16   Temp: 97.8 °F (36.6 °C) 97.8 °F (36.6 °C)   TempSrc: Oral Oral       General:  Alert, cooperative, no distress. Ears: External otic canals are within acceptable limits. Teed grade 1 on the right and 0 on the left pre-treatment. Teed grade 1 on the right and 1 on the left post-treatment. Lungs:  Clear to auscultation bilaterally. Ulcer: Not examined today in HBO, if applicable. Recent Labs     20  0921 20  1129   POCGLU 258* 185*       Assessment     Jayme Wan is a 39 y.o. male who presented to the [facility] today for hyperbaric oxygen treatment #27 of 30 for the diagnosis as stated above.  Treatment given at 2 VINCENT for 90 minutes, with no air breaks. Total Treatment Time (min): 108 today, including compression, 100% oxygen at pressure, air breaks if applicable, and decompression. Patient Active Problem List   Diagnosis Code    Hypertension I10    Uncontrolled type 2 diabetes mellitus with diabetic polyneuropathy (HCC) E11.42, E11.65    Cardiomyopathy ([facility] I42.9    Mixed hyperlipidemia E78.2    Diabetic ulcer of left forefoot associated with type 2 diabetes mellitus, with necrosis of bone (HCC) E11.621, L97.524    Allergic rhinitis J30.9    Osteoarthritis M19.90    Surgical wound dehiscence, initial encounter T81.31XA    [facility] I17.593       In my clinical judgement, ongoing HBO therapy is necessary at this time, given a threat to patient function, limb or life from the current condition. Adjunctive Rx, objective weekly progress and goals of Rx will periodically be updated, on Mondays. Plan     1. Hyperbaric Oxygen - Adebayone Josi Osei tolerated the treatment well today without complications. Continue HBO treatment as outlined above. 2. Other -     I was present on these premises and immediately available to furnish assistance & direction throughout the procedure.      -- Electronically signed by Jensen Austin MD on 12/13/2020 at 5:25 PM

## 2020-12-14 ENCOUNTER — HOSPITAL ENCOUNTER (OUTPATIENT)
Dept: HYPERBARIC MEDICINE | Age: 45
Discharge: HOME | End: 2020-12-14
Payer: COMMERCIAL

## 2020-12-14 ENCOUNTER — HOSPITAL ENCOUNTER (OUTPATIENT)
Dept: WOUND CARE | Age: 45
Discharge: HOME | End: 2020-12-14
Payer: COMMERCIAL

## 2020-12-14 VITALS
TEMPERATURE: 98.3 F | RESPIRATION RATE: 16 BRPM | SYSTOLIC BLOOD PRESSURE: 133 MMHG | DIASTOLIC BLOOD PRESSURE: 81 MMHG | HEART RATE: 97 BPM

## 2020-12-14 LAB
GLUCOSE BLD-MCNC: 218 MG/DL (ref 70–99)
PERFORMED ON: ABNORMAL

## 2020-12-14 PROCEDURE — 11042 DBRDMT SUBQ TIS 1ST 20SQCM/<: CPT

## 2020-12-14 RX ORDER — LIDOCAINE HYDROCHLORIDE 40 MG/ML
SOLUTION TOPICAL ONCE
Status: DISCONTINUED | OUTPATIENT
Start: 2020-12-14 | End: 2020-12-15 | Stop reason: HOSPADM

## 2020-12-14 ASSESSMENT — PAIN SCALES - GENERAL: PAINLEVEL_OUTOF10: 0

## 2020-12-14 NOTE — PROGRESS NOTES
including compression, 100% oxygen at pressure, air breaks if applicable, and decompression. Patient Active Problem List   Diagnosis Code    Hypertension I10    Uncontrolled type 2 diabetes mellitus with diabetic polyneuropathy ([facility]) E11.42, E11.65    Cardiomyopathy ([facility] I42.9    Mixed hyperlipidemia E78.2    Diabetic ulcer of left forefoot associated with type 2 diabetes mellitus, with necrosis of bone ([facility]) E11.621, L97.524    Cellulitis of left foot L03. 116    Allergic rhinitis J30.9    Osteoarthritis M19.90    Acute osteomyelitis of metatarsal bone of left foot ([facility]) M86.172    Surgical wound dehiscence, initial encounter T81.31XA    Compromised soft 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 toe amputation site T86.821    Chronic osteomyelitis of left foot ([facility] W99.621       In my clinical judgement, ongoing HBO therapy is necessary at this time, given a threat to patient function, limb or life from the current condition. Adjunctive Rx, objective weekly progress and goals of Rx will periodically be updated, on Mondays. Plan     1. Hyperbaric Oxygen - Linsey Osei tolerated the treatment well today without complications. Continue HBO treatment as outlined above. 2. Other -     I was present on these premises and immediately available to furnish assistance & direction throughout the procedure.      -- Electronically signed by Keshav Fischer MD on 11/4/2020 at 12:59 PM
No cyanosis, clubbing or edema

## 2020-12-14 NOTE — PLAN OF CARE
Pt to the [facility] for follow up appointment. Wound debrided today per Dr Daphnie Vasquez. Pt to continue with biweekly dressing changes. PICC line was pulled last week. Pt to continue with HBOT. Pt to follow up in the [facility] in 1 week with Dr Daphnie Vasquez and wound reassessment dressing change on Thursday. Discharge instructions reviewed with patient, all questions answered, copy given to patient. Dressings were applied to all wounds per M.D. Instructions at this visit.

## 2020-12-14 NOTE — PROGRESS NOTES
[facility] Progress Note      David Peoples     : 1975    DATE OF VISIT:  2020    Subjective:     David Peoples is a 39 y.o. male who has a chief complaint of a diabetic ulcer located on the left foot. Has been staying off his foot. No issues with HBO. Stomach does not feel great today and believes it is related to what he ate for dinner. Did not do HBO due to stomach issues. Mr. Melita Jean has a past medical history of Acute osteomyelitis of right hallux ([address] Cellulitis of left foot, CHF (congestive heart failure) ([address] Clostridium difficile infection, Diabetic ulcer of right great toe associated with type 2 diabetes mellitus, with necrosis of bone ([address] Failed soft 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 toe amputation site, Migraine, Possible perforated tympanic membrane, Post-op hematoma of left foot, Recurrent otitis media, Tear of medial meniscus of left knee, Tobacco use, and Toe osteomyelitis, left ([address] He has a past surgical history that includes Tonsillectomy; Anna tooth extraction; Knee arthroscopy (Left, [id number]); Toe amputation (Right, 2019); other surgical history (Left, 2020); Toe amputation (Left, 2020); and Toe amputation (Left, 10/22/2020). His family history includes Diabetes in his father and mother; High Blood Pressure in his father, maternal grandmother, maternal uncle, mother, and sister; High Cholesterol in his father, maternal uncle, and mother; Stroke in his father. Mr. Melita Jean reports that he quit smoking about 15 years ago. He has a 1.00 pack-year smoking history. He quit smokeless tobacco use about 15 years ago. He reports current alcohol use. He reports that he does not use drugs.     His current medication list consists of Aspirin-Acetaminophen-Caffeine, Contour Next EZ, Insulin Glargine (1 Unit Dial), Insulin Pen Needle, PARoxetine, ammonium lactate, blood glucose test strips, carvedilol, furosemide, gabapentin, glimepiride, insulin lispro, insulin lispro (1 Unit Dial), loratadine, losartan, oxymetazoline, sildenafil, simvastatin, tiZANidine, and traZODone. Allergies: Januvia [sitagliptin], Metformin and related, Vancomycin, and Mustard oil [allyl isothiocyanate]    Pertinent items from the review of systems are discussed in the HPI; the remainder of the ROS was reviewed and is negative. Objective:     /81   Pulse 97   Temp 98.3 °F (36.8 °C) (Oral)   Resp 16   General Appearance: alert and oriented to person, place and time, well developed and well- nourished, in no acute distress  Skin: warm and dry, no rash or erythema  Head: normocephalic and atraumatic  Eyes: pupils equal, round, and reactive to light, extraocular eye movements intact, conjunctivae normal  ENT: tympanic membrane, external ear and ear canal normal bilaterally, nose without deformity, nasal mucosa and turbinates normal without polyps  Neck: supple and non-tender without mass, no thyromegaly or thyroid nodules, no cervical lymphadenopathy  Pulmonary/Chest: clear to auscultation bilaterally- no wheezes, rales or rhonchi, normal air movement, no respiratory distress  Cardiovascular: normal rate, regular rhythm, normal S1 and S2, no murmurs, rubs, clicks, or gallops, distal pulses intact, no carotid bruits  Abdomen: soft, non-tender, non-distended, normal bowel sounds, no masses or organomegaly.      Dorsalis pedis pulse left palpable  Posterior tibial pulse left palpable  Dorsalis pedis pulse right palpable  Posterior tibial pulse right palpable    Ulcer on the left 2nd digit amputation site with mild fibrotic tissue, red granulation tissue, mild serous drainage, no hyperkeratotic rim, no undermining, no tunneling, no purulence, no malodor, no eschar, no periwound maceration, mild periwound erythema, mild edema, no crepitus, no increase in skin temperature, ulcer probes to soft tissue only     Ulcer on the left hallux amputation site with minimal fibrotic tissue, red granulation tissue, mild serous drainage, no hyperkeratotic rim, no undermining, no tunneling, no purulence, no malodor, no eschar, no periwound maceration, moderate periwound erythema, mild edema, no crepitus, no increase in skin temperature, ulcer probes to soft tissue only    Today's ulcer measurements are in the wound documentation flowsheet. Wound measurements:  Wound 11/02/20 #6, left second toe, diabetic 2,  full thickness, onset 11/2/2020-Wound Length (cm): 0.1 cm  Wound 07/31/20 Proximal #1, Left Great toe amp site, DFU, Reyna 4, onset 7/24/20-Wound Length (cm): 2.5 cm    Wound 11/02/20 #6, left second toe, diabetic 2,  full thickness, onset 11/2/2020-Wound Width (cm): 0.1 cm  Wound 07/31/20 Proximal #1, Left Great toe amp site, DFU, Reyna 4, onset 7/24/20-Wound Width (cm): 0.6 cm    Wound 11/02/20 #6, left second toe, diabetic 2,  full thickness, onset 11/2/2020-Wound Depth (cm): 0.2 cm  Wound 07/31/20 Proximal #1, Left Great toe amp site, DFU, Reyna 4, onset 7/24/20-Wound Depth (cm): 2.5 cm    LABS  Lab Results   Component Value Date    LABA1C 7.8 10/26/2020     Xray left foot - postop changes      Assessment:     Patient Active Problem List   Diagnosis Code    Hypertension I10    Uncontrolled type 2 diabetes mellitus with diabetic polyneuropathy ([facility]) E11.42, E11.65    Cardiomyopathy ([facility] I42.9    Mixed hyperlipidemia E78.2    Diabetic ulcer of left forefoot associated with type 2 diabetes mellitus, with necrosis of bone ([facility]) E11.621, L97.524    Allergic rhinitis J30.9    Osteoarthritis M19.90    Surgical wound dehiscence, initial encounter T81.31XA    Chronic osteomyelitis of left foot ([facility]) W49.966       Assessment of today's active condition(s): osteomyelitis left foot, diabetic foot ulcer left hallux amputation, diabetes mellitus. Factors contributing to occurrence and/or persistence of the chronic ulcer include diabetes and poor glucose control.  [facility]

## 2020-12-15 NOTE — PROGRESS NOTES
Brief HBO progress note during his wound-care follow-up today with Dr. ROBIN Espinoza. IV antibiotics stopped last week with near-normalization of labs, and PICC removed. Still has some room for improvement with diabetic diet and glucose control. Was planning on diving today, but has some nausea, and is afraid that he might get sick from lying in the chamber for a couple of hours today, so hope to resume HBOT tomorrow. No abd pain, no vomiting at this point, no fever or chills. BP and glucose quite high today at home, not as high here. Not sure if his new glucometer is accurate, so he'd like to bring it in and test simultaneously against ours. I think it's more likely that his diet is somewhat inconsistent, leading to wide variations in glucoses, as opposed to him having a meter that's not working properly. Second toe amp site just about healed. First ray site all red and granular, and wound area continues to improve, but still a central area of depth and slight tunneling, down to about 2 - 2.5 cm. Because of that persistent depth that is slow to improve, I recommended that we extend HBOT beyond 30 dives, tentatively to 50, but with a weekly decision as to how much therapy he needs, based on clinical progress and tolerance. He's in agreement. Graph of recent measurements of first ray site below:        Local care per Dr. ROBIN Espinoza. Hopefully we see Mr. Robyn Ryder for HBOT tomorrow.     Electronically signed by Simon Wei MD on 12/15/2020 at 11:05 AM

## 2020-12-16 ENCOUNTER — HOSPITAL ENCOUNTER (OUTPATIENT)
Dept: HYPERBARIC MEDICINE | Age: 45
Discharge: HOME | End: 2020-12-16
Payer: COMMERCIAL

## 2020-12-16 VITALS
TEMPERATURE: 98.6 F | HEART RATE: 94 BPM | RESPIRATION RATE: 16 BRPM | SYSTOLIC BLOOD PRESSURE: 130 MMHG | DIASTOLIC BLOOD PRESSURE: 78 MMHG

## 2020-12-16 LAB
GLUCOSE BLD-MCNC: 238 MG/DL (ref 70–99)
GLUCOSE BLD-MCNC: 295 MG/DL (ref 70–99)
PERFORMED ON: ABNORMAL
PERFORMED ON: ABNORMAL

## 2020-12-16 PROCEDURE — 99183 HYPERBARIC OXYGEN THERAPY: CPT | Performed by: EMERGENCY MEDICINE

## 2020-12-16 PROCEDURE — G0277 HBOT, FULL BODY CHAMBER, 30M: HCPCS

## 2020-12-16 ASSESSMENT — PAIN SCALES - GENERAL
PAINLEVEL_OUTOF10: 0
PAINLEVEL_OUTOF10: 0

## 2020-12-16 NOTE — PLAN OF CARE
Pt to the [address] [place] [address] for HBOT. Assessment completed and patient cleared for treatment. Pt to 2.0 VINCENT at a rate of 1.5 psi. Pt to pressure without complaints. Nurse at chamber side and will continue to monitor.

## 2020-12-16 NOTE — PROGRESS NOTES
compression, 100% oxygen at pressure, air breaks if applicable, and decompression. Patient Active Problem List   Diagnosis Code    Hypertension I10    Uncontrolled type 2 diabetes mellitus with diabetic polyneuropathy (HCC) E11.42, E11.65    [facility] I42.9    Mixed hyperlipidemia E78.2    Diabetic ulcer of left forefoot associated with type 2 diabetes mellitus, with necrosis of bone (HCC) E11.621, L97.524    Allergic rhinitis J30.9    Osteoarthritis M19.90    Surgical wound dehiscence, initial encounter T81.31XA    Chronic osteomyelitis of left foot ([facility] C37.105       In my clinical judgement, ongoing HBO therapy is necessary at this time, given a threat to patient function, limb or life from the current condition. Adjunctive Rx, objective weekly progress and goals of Rx will periodically be updated, on Mondays. Plan     1. Hyperbaric Oxygen - Mknakia Davila CHAU Osei tolerated the treatment well today without complications. Continue HBO treatment as outlined above. 2. Other -     I was present on these premises and immediately available to furnish assistance & direction throughout the procedure.      -- Electronically signed by Natalie Dimas MD on 12/16/2020 at 10:15 AM

## 2020-12-17 ENCOUNTER — HOSPITAL ENCOUNTER (OUTPATIENT)
Dept: HYPERBARIC MEDICINE | Age: 45
Discharge: HOME | End: 2020-12-17
Payer: COMMERCIAL

## 2020-12-17 VITALS
SYSTOLIC BLOOD PRESSURE: 137 MMHG | TEMPERATURE: 97.9 F | RESPIRATION RATE: 18 BRPM | HEART RATE: 89 BPM | DIASTOLIC BLOOD PRESSURE: 98 MMHG

## 2020-12-17 LAB
GLUCOSE BLD-MCNC: 146 MG/DL (ref 70–99)
GLUCOSE BLD-MCNC: 195 MG/DL (ref 70–99)
PERFORMED ON: ABNORMAL
PERFORMED ON: ABNORMAL

## 2020-12-17 PROCEDURE — G0277 HBOT, FULL BODY CHAMBER, 30M: HCPCS

## 2020-12-17 PROCEDURE — 99183 HYPERBARIC OXYGEN THERAPY: CPT | Performed by: EMERGENCY MEDICINE

## 2020-12-17 NOTE — PROGRESS NOTES
[facility]  Hyperbaric Oxygen Therapy   Progress Note      NAME: Nolberto Galan   MEDICAL RECORD NUMBER:  [id number]  AGE: 39 y.o. GENDER: male  : 1975  EPISODE DATE:  2020     Subjective     HBO Treatment Number: 29 out of Total Treatments: 50    HBO Diagnosis:     Indications: Lower Extremity Diabetic Wound ___(site) left foot  Reyna: Reyna 3     Safety checks performed prior to treatment. See doc flowsheets for documentation. Objective           Recent Labs     20  0810 20  1012   POCGLU 195* 146*           Glucose Testing  Blood Glucose POCT: (!) 146     Pre treatment Vital Signs       Temp: 97.7 °F (36.5 °C)     Pulse: 95     Resp: 18   BP: (!) 136/91       Post treatment Vital Signs  Temp: 97.9 °F (36.6 °C)  Pulse: 89  Resp: 18  BP: (!) 137/98      Assessment        Physical Exam:  General Appearance:  alert and oriented to person, place and time, well-developed and well-nourished, in no acute distress    Pre Tympanic Membrane Assessment:  tympanic membranes intact bilaterally    Post Tympanic Membrane Assessment:  Right: Grade I  Left: Grade I  Pulmonary/Chest:  clear to auscultation bilaterally- no wheezes, rales or rhonchi, normal air movement, no respiratory distress    Cardiovascular:  regular rate and rhythm    Plan        Patient placed in a fully body Monoplace Chamber #: [id number]       Treatment Start Time: 0818     Pressure Reached Time: 0828  VINCENT : 2  Number of Air Breaks:  Treatment Status: No Air break      Decompression Time: 2272   Treatment End Time: 1006  Total Treatment Time (min): 108    Symptoms Noted During Treatment: None      Adverse Event: no      I was present on these premises and immediately available to furnish assistance & direction throughout the procedure. Nolberto Galan is a 39 y.o. male  did successfully complete today's hyperbaric oxygen treatment at [facility] and HBO therapy.     In my clinical judgement, ongoing HBO therapy is  necessary at this time, given a threat to patient function, limb or life from the current condition. Supervision and attendance of Hyperbaric Oxygen Therapy provided. Continue HBO treatment as outlined in the treatment plan. Hyperbaric Oxygen: Freda Osei tolerated Treatment Number: 34 well today without complications.      Electronically signed by Gaston Robb MD on 12/17/2020 at 10:55 AM

## 2020-12-17 NOTE — PLAN OF CARE
Patient seen for HBOT treatment  29 / 48    today. Patient assessment complete and cleared for HBOT. Patient was compressed in HBO chamber to 2.0 VINCENT at 1.5 psi/min. Patient is tolerating treatment well and is currently resting comfortably and watching television. HBO RN at chamber side, will continue to monitor.

## 2020-12-18 ENCOUNTER — HOSPITAL ENCOUNTER (OUTPATIENT)
Dept: HYPERBARIC MEDICINE | Age: 45
Discharge: HOME | End: 2020-12-18
Payer: COMMERCIAL

## 2020-12-18 VITALS
HEART RATE: 88 BPM | TEMPERATURE: 98.2 F | SYSTOLIC BLOOD PRESSURE: 145 MMHG | DIASTOLIC BLOOD PRESSURE: 99 MMHG | RESPIRATION RATE: 16 BRPM

## 2020-12-18 LAB
GLUCOSE BLD-MCNC: 126 MG/DL (ref 70–99)
GLUCOSE BLD-MCNC: 144 MG/DL (ref 70–99)
PERFORMED ON: ABNORMAL
PERFORMED ON: ABNORMAL

## 2020-12-18 PROCEDURE — G0277 HBOT, FULL BODY CHAMBER, 30M: HCPCS

## 2020-12-18 PROCEDURE — 99183 HYPERBARIC OXYGEN THERAPY: CPT | Performed by: INTERNAL MEDICINE

## 2020-12-18 ASSESSMENT — PAIN SCALES - GENERAL
PAINLEVEL_OUTOF10: 0
PAINLEVEL_OUTOF10: 0

## 2020-12-18 NOTE — PLAN OF CARE
Patient seen for HBOT treatment  30 / 48   today. Patient assessment WNL -AVSS- lungs CTA, TEED-0 bilat - denies pain/ pressure , Vision changes unchanged- slgt blurriness reported , FSBS 144- cleared for HBOT. Patient was compressed in HBO chamber to 2.0 VINCENT at 1.5 psi/min x 90 min treatment w/o air breaks. Patient is tolerating treatment well and is currently resting comfortably and watching television. HBO RN at chamber side, will continue to monitor. Discussed schedule changes for next 2 weeks due to holidays.

## 2020-12-20 NOTE — PROGRESS NOTES
[address]  Hyperbaric Oxygen    Everardo Oliva     : 1975    DATE OF VISIT:  2020    Subjective     Everardo Oliva is a 39 y.o. 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 He presents to the [facility] today for hyperbaric oxygen treatment of his Reyna 3 DFU, which is refractory to standard therapy for 30 days. Patient denies fever. Patient denies nausea, vomiting or diarrhea; no ear troubles and no other new complaints; no fears or anxiety regarding treatment today. Objective     Vitals:    20 0810 20 1015   BP: 139/88 (!) 145/99   Pulse: 99 88   Resp: 18 16   Temp: 98.8 °F (37.1 °C) 98.2 °F (36.8 °C)   TempSrc: Oral Oral       General:  Alert, cooperative, no distress. Ears: External otic canals are within acceptable limits. Teed grade 1 on the right and 1 on the left pre-treatment. Teed grade 1 on the right and 1 on the left post-treatment. Lungs:  Clear to auscultation bilaterally. Ulcer: Not examined today in HBO, if applicable.       Recent Labs     20  0806 20  1013   POCGLU 144* 126*       Assessment     Everardo Oliva is a 39 y.o. male who presented to the [facility] today for hyperbaric oxygen treatment #30 of 50 for the diagnosis as stated

## 2020-12-21 ENCOUNTER — HOSPITAL ENCOUNTER (OUTPATIENT)
Dept: HYPERBARIC MEDICINE | Age: 45
Discharge: HOME | End: 2020-12-21
Payer: COMMERCIAL

## 2020-12-21 ENCOUNTER — HOSPITAL ENCOUNTER (OUTPATIENT)
Dept: WOUND CARE | Age: 45
Discharge: HOME | End: 2020-12-21
Payer: COMMERCIAL

## 2020-12-21 VITALS
TEMPERATURE: 98.2 F | DIASTOLIC BLOOD PRESSURE: 103 MMHG | SYSTOLIC BLOOD PRESSURE: 170 MMHG | RESPIRATION RATE: 16 BRPM | HEART RATE: 90 BPM

## 2020-12-21 VITALS
HEIGHT: 72 IN | TEMPERATURE: 98.2 F | SYSTOLIC BLOOD PRESSURE: 143 MMHG | DIASTOLIC BLOOD PRESSURE: 97 MMHG | RESPIRATION RATE: 16 BRPM | WEIGHT: 274 LBS | BODY MASS INDEX: 37.11 KG/M2 | HEART RATE: 90 BPM

## 2020-12-21 LAB
GLUCOSE BLD-MCNC: 158 MG/DL (ref 70–99)
GLUCOSE BLD-MCNC: 215 MG/DL (ref 70–99)
PERFORMED ON: ABNORMAL
PERFORMED ON: ABNORMAL

## 2020-12-21 PROCEDURE — G0277 HBOT, FULL BODY CHAMBER, 30M: HCPCS

## 2020-12-21 PROCEDURE — 99212 OFFICE O/P EST SF 10 MIN: CPT

## 2020-12-21 PROCEDURE — 99183 HYPERBARIC OXYGEN THERAPY: CPT | Performed by: INTERNAL MEDICINE

## 2020-12-21 RX ORDER — INSULIN LISPRO 100 [IU]/ML
INJECTION, SOLUTION INTRAVENOUS; SUBCUTANEOUS
Qty: 15 ML | Refills: 2 | Status: SHIPPED | OUTPATIENT
Start: 2020-12-21 | End: 2021-01-01 | Stop reason: SDUPTHER

## 2020-12-21 RX ORDER — LIDOCAINE HYDROCHLORIDE 40 MG/ML
SOLUTION TOPICAL ONCE
Status: DISCONTINUED | OUTPATIENT
Start: 2020-12-21 | End: 2020-12-22 | Stop reason: HOSPADM

## 2020-12-21 ASSESSMENT — PAIN SCALES - GENERAL
PAINLEVEL_OUTOF10: 0

## 2020-12-21 NOTE — PLAN OF CARE
Pt to the [facility] for follow up appointment. Wounds measuring smaller. Pt to have weekly dressing applied today. Pt to continue HBOT this week. Pt to follow up in the [facility] in 1 week. Discharge instructions reviewed with patient, all questions answered, copy given to patient. Dressings were applied to all wounds per M.D. Instructions at this visit.

## 2020-12-21 NOTE — DISCHARGE INSTR - COC
Continuity of Care Form    Patient Name: Brendan Mathews   :  1975  MRN:  [id number]    Admit date:  2020  Discharge date:  ***    Code Status Order: Prior   Advance Directives:   [facility] Documentation     Date/Time Healthcare Directive Type of Healthcare Directive Copy in [address] Agent's Name Healthcare Agent's Phone Number    [phone]  Yes, patient has an advance directive for healthcare treatment  Durable power of  for health care  Yes, copy in chart  [address] [place] [address]  ..56          Admitting Physician:  No admitting provider for patient encounter. PCP: Ran Browne MD    Discharging Nurse: [facility] Unit/Room#: No information available for this encounter. Discharging Unit Phone Number: ***    Emergency Contact:   Extended Emergency Contact Information  Primary Emergency Contact: Juarez Osei  Address: [address] [place] [address] Phone: .56  Relation: Spouse    Past Surgical History:  Past Surgical History:   Procedure Laterality Date    KNEE ARTHROSCOPY Left [id number]    LEFT KNEE ARTHROSCOPY , SYNOVECTOMY       OTHER SURGICAL HISTORY Left 2020    PARTIAL LEFT FOOT AMPUTATION    TOE AMPUTATION Right 2019    PARTIAL RIGHT FOOT AMPUTATION performed by Dianna Khan DPM at [facility] Left 2020    PARTIAL LEFT FOOT AMPUTATION performed by Dianna Khan DPM at [facility] TOE AMPUTATION Left 10/22/2020    PARTIAL LEFT FOOT AMPUTATION WITH GRAFT APPLICATION performed by Dianna Khan DPM at [facility] EXTRACTION         Immunization History: There is no immunization history on file for this patient.     Active Problems:  Patient Active Problem List   Diagnosis Code    Hypertension I10    Uncontrolled type 2 diabetes mellitus with diabetic polyneuropathy ([facility] [address] E11.42, E11.65    Cardiomyopathy ([facility] [address] I42.9    Mixed hyperlipidemia E78.2    Diabetic ulcer of left forefoot associated with type 2 diabetes mellitus, with necrosis of bone ([facility]) E11.621, L97.524    Allergic rhinitis J30.9    Osteoarthritis M19.90    Surgical wound dehiscence, initial encounter T81.31XA    Chronic osteomyelitis of left foot ([facility]) J50.960       Isolation/Infection:   Isolation          No Isolation        Patient Infection Status     Infection Onset Added Last Indicated Last Indicated By Review Planned Expiration Resolved Resolved By    None active    Resolved    COVID-19 Rule Out 10/20/20 10/20/20 10/20/20 COVID-19 (Ordered)   10/20/20 Rule-Out Test Resulted          Nurse Assessment:  Last Vital Signs: BP (!) 170/103 Comment: post HBO denies s/sx, will recheck in [facility]  Pulse 90   Temp 98.2 °F (36.8 °C) (Oral)   Resp 16     Last documented pain score (0-10 scale): Pain Level: 0  Last Weight:   Wt Readings from Last 1 Encounters:   12/21/20 274 lb (124.3 kg)     Mental Status:  {IP PT MENTAL STATUS:20030}    IV Access:  { DELIO IV ACCESS:552410312}    Nursing Mobility/ADLs:  Walking   {CHP DME HJZU:704674441}  Transfer  {CHP DME QNKV:152520031}  Bathing  {CHP DME EFDR:658799457}  Dressing  {CHP DME UKPY:982891936}  Toileting  {CHP DME HLES:369348280}  Feeding  {P DME SWDZ:602244381}  Med Admin  {CHP DME JRXW:142140093}  Med Delivery   { DELIO MED Delivery:393624729}    Wound Care Documentation and Therapy:  Wound 07/31/20 Proximal #1, Left Great toe amp site, DFU, Reyna 4, onset 7/24/20 (Active)   Wound Image   12/07/20 1034   Wound Etiology Diabetic Reyna 4 12/21/20 1016   Dressing Status New dressing applied 12/21/20 1057   Wound Cleansed Irrigated with saline 12/21/20 1057   Dressing/Treatment Other (comment) 12/17/20 1020   Offloading for Diabetic Foot Ulcers Post op shoe 12/21/20 1057   Wound Length (cm) 2.4 cm 12/21/20 1016   Wound Width (cm) 0.3 cm 12/21/20 1016   Wound Depth (cm) 0.6 cm Tunneling (cm) 0 cm 20 0834   Tunneling Position ___ O'Clock 0 20 0834   Undermining Starts ___ O'Clock 0 20 0834   Undermining Ends___ O'Clock 0 20 0834   Undermining Maxium Distance (cm) 0 20 0834   Wound Assessment Pink/red 20 1016   Drainage Amount Scant 20 1016   Drainage Description Serous 20 1016   Odor None 20 1016   Shanita-wound Assessment Hyperkeratosis (callous) 20 1015   Margins Attached edges 20 1015   Number of days: 48        Elimination:  Continence:   · Bowel: {YES / FW:27825}  · Bladder: {YES / DQ:28504}  Urinary Catheter: {Urinary Catheter:695793104}   Colostomy/Ileostomy/Ileal Conduit: {YES / IA:62822}       Date of Last BM: ***  No intake or output data in the 24 hours ending 20 1224  No intake/output data recorded.     Safety Concerns:     [employer] Safety Concerns:913851047}    Impairments/Disabilities:      [employer] Impairments/Disabilities:693313281}    Nutrition Therapy:  Current Nutrition Therapy:   [employer] Diet List:120294066}    Routes of Feeding: {P DME Other Feedings:788232273}  Liquids: {Slp liquid thickness:09083}  Daily Fluid Restriction: {CHP DME Yes amt example:380160386}  Last Modified Barium Swallow with Video (Video Swallowing Test): {Done Not Done JODA:517453590}    Treatments at the Time of Hospital Discharge:   Respiratory Treatments: ***  Oxygen Therapy:  {Therapy; copd oxygen:03068}  Ventilator:    { CC Vent FIDJ:176601377}    Rehab Therapies: {THERAPEUTIC INTERVENTION:6679145527}  Weight Bearing Status/Restrictions: [address] [employer]  Weight Bearin}  Other Medical Equipment (for information only, NOT a DME order):  {EQUIPMENT:508028208}  Other Treatments: ***    Patient's personal belongings (please select all that are sent with patient):  {CHP DME Belongings:551632490}    RN SIGNATURE:  {Esignature:215386632}    CASE MANAGEMENT/SOCIAL WORK SECTION    Inpatient Status Date: ***    Readmission Risk Assessment Score:  Readmission Risk              Risk of Unplanned Readmission:        0           Discharging to Facility/ Agency   · Name:   · Address:  · Phone:  · Fax:    Dialysis Facility (if applicable)   · Name:  · Address:  · Dialysis Schedule:  · Phone:  · Fax:    / signature: {Esignature:743522158}    PHYSICIAN SECTION    Prognosis: {Prognosis:3945890150}    Condition at Discharge: [facility] Patient Condition:398589389}    Rehab Potential (if transferring to Rehab): {Prognosis:2264162009}    Recommended Labs or Other Treatments After Discharge: ***    Physician Certification: I certify the above information and transfer of Mike Siddiqui  is necessary for the continuing treatment of the diagnosis listed and that he requires {Admit to Appropriate Level of Care:05703} for {GREATER/LESS:851835552} 30 days.      Update Admission H&P: {CHP DME Changes in XKHRO:462375020}    PHYSICIAN SIGNATURE:  {Esignature:372464935}

## 2020-12-22 ENCOUNTER — HOSPITAL ENCOUNTER (OUTPATIENT)
Dept: HYPERBARIC MEDICINE | Age: 45
Discharge: HOME | End: 2020-12-22
Payer: COMMERCIAL

## 2020-12-22 VITALS
HEART RATE: 101 BPM | SYSTOLIC BLOOD PRESSURE: 181 MMHG | DIASTOLIC BLOOD PRESSURE: 98 MMHG | RESPIRATION RATE: 16 BRPM | TEMPERATURE: 98.6 F

## 2020-12-22 LAB
GLUCOSE BLD-MCNC: 180 MG/DL (ref 70–99)
GLUCOSE BLD-MCNC: 208 MG/DL (ref 70–99)
PERFORMED ON: ABNORMAL
PERFORMED ON: ABNORMAL

## 2020-12-22 PROCEDURE — G0277 HBOT, FULL BODY CHAMBER, 30M: HCPCS

## 2020-12-22 PROCEDURE — 99183 HYPERBARIC OXYGEN THERAPY: CPT | Performed by: INTERNAL MEDICINE

## 2020-12-22 ASSESSMENT — PAIN - FUNCTIONAL ASSESSMENT: PAIN_FUNCTIONAL_ASSESSMENT: PREVENTS OR INTERFERES SOME ACTIVE ACTIVITIES AND ADLS

## 2020-12-22 ASSESSMENT — PAIN DESCRIPTION - DESCRIPTORS: DESCRIPTORS: ACHING

## 2020-12-22 ASSESSMENT — PAIN DESCRIPTION - ORIENTATION: ORIENTATION: LEFT

## 2020-12-22 ASSESSMENT — PAIN DESCRIPTION - FREQUENCY: FREQUENCY: INTERMITTENT

## 2020-12-22 ASSESSMENT — PAIN DESCRIPTION - LOCATION: LOCATION: FOOT

## 2020-12-22 ASSESSMENT — PAIN DESCRIPTION - DIRECTION: RADIATING_TOWARDS: DENIES

## 2020-12-22 ASSESSMENT — PAIN DESCRIPTION - ONSET: ONSET: ON-GOING

## 2020-12-22 ASSESSMENT — PAIN SCALES - GENERAL: PAINLEVEL_OUTOF10: 3

## 2020-12-22 ASSESSMENT — PAIN DESCRIPTION - PROGRESSION: CLINICAL_PROGRESSION: NOT CHANGED

## 2020-12-22 ASSESSMENT — PAIN DESCRIPTION - PAIN TYPE: TYPE: ACUTE PAIN

## 2020-12-22 NOTE — PROGRESS NOTES
[address]  Hyperbaric Oxygen    Becky Nicholas     : 1975    DATE OF VISIT:  2020    Subjective     Becky Nicholas is a 39 y.o. male who  has a past medical history of Acute osteomyelitis of right hallux ([facility] [address] (2019), Cellulitis of left foot (2020), CHF (congestive heart failure) ([facility] [address] (2014), Clostridium difficile infection (2016), Diabetic ulcer of right great toe associated with type 2 diabetes mellitus, with necrosis of bone ([facility] [address] (2019), Failed soft 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 toe amputation site (10/26/2020), History of hyperbaric oxygen therapy (10/28/2020), Migraine, Possible perforated tympanic membrane, Post-op hematoma of left foot (2020), Recurrent otitis media, Tear of medial meniscus of left knee, Tobacco use, and Toe osteomyelitis, left ([facility] [address] (2020). He presents to the [facility] today for hyperbaric oxygen treatment of his Reyna 3 DFU, which is refractory to standard therapy for 30 days. Patient denies fever. Patient denies nausea, vomiting or diarrhea; no ear troubles and no other new complaints; no fears or anxiety regarding treatment today. Objective     Vitals:    20 0759 20 0955   BP: (!) 156/97  Comment: denies any s/sx pre HBO (!) 170/103  Comment: post HBO denies s/sx, will recheck in [facility]   Pulse: 96 90   Resp: 16 16   Temp: 97.8 °F (36.6 °C) 98.2 °F (36.8 °C)   TempSrc: Oral Oral       General:  Alert, cooperative, no distress. Ears: External otic canals are within acceptable limits. Teed grade 0 on the right and 1 on the left pre-treatment. Teed grade 1 on the right and 1 on the left post-treatment. Lungs:  Clear to auscultation bilaterally.     Ulcer: Second toe amp site healed; first ray wound basically 100% granular, less serous to serosanguinous drainage, maybe a bit of biofilm, smaller area, less depth (closer to 1 cm, vs 2 - 2.5 cm the last couple of weeks), no deep structures palpable; still mild-mod pedal lymphedema, no cellulitis. Recent Labs     12/21/20  0756 12/21/20  0958   POCGLU 215* 158*       Assessment     Merlin Mayans is a 39 y.o. male who presented to the [facility] today for hyperbaric oxygen treatment #31 of 50 for the diagnosis as stated above. Treatment given at 2 VINCENT for 90 minutes, with no air breaks. Total Treatment Time (min): 108 today, including compression, 100% oxygen at pressure, air breaks if applicable, and decompression. Patient Active Problem List   Diagnosis Code    Hypertension I10    Uncontrolled type 2 diabetes mellitus with diabetic polyneuropathy (HCC) E11.42, E11.65    Cardiomyopathy ([facility] [address] I42.9    Mixed hyperlipidemia E78.2    Diabetic ulcer of left forefoot associated with type 2 diabetes mellitus, with necrosis of bone ([facility]) E11.621, L97.524    Allergic rhinitis J30.9    Osteoarthritis M19.90    Surgical wound dehiscence, initial encounter T81.31XA    Chronic osteomyelitis of left foot ([facility] [address] M84.873       In my clinical judgement, ongoing HBO therapy is necessary at this time, given a threat to patient function, limb or life from the current condition. Plan     1. Hyperbaric Oxygen - [facility] CHAU Osei tolerated the treatment well today without complications. Continue HBO treatment as outlined above. Anticipate that we won't need to go to 50 dives (probably closer to 40?), but will see how he looks again next Monday. 2. Other - local care per Dr. Jennifer Good; continue to focus on glucose control. Leg elevation when at rest to keep edema down. I was present on these premises and immediately available to furnish assistance & direction throughout the procedure.      -- Electronically signed by Maritza Hess MD on 12/21/2020 at 8:57 PM

## 2020-12-22 NOTE — PLAN OF CARE
Patient to [facility] for HBO treatment. Patient reports pain 3/10 today in foot. Requesting refill for pain medication. MD aware. Patient VS per doc flow sheet.  today patient reports eating granola bar for breakfast. Education provided for healthy diet and wound healing. Patient reports taking 18 units insulin in AM for BG. Patient TEEDS = R-0, l-1. Patient travel at 1.5 PSI today, afrin provided, patient informed to clear ears often. Patient denies any pain in ears. Will continue to monitor. RN at chamber side.

## 2020-12-23 ENCOUNTER — HOSPITAL ENCOUNTER (OUTPATIENT)
Dept: HYPERBARIC MEDICINE | Age: 45
Discharge: HOME | End: 2020-12-23
Payer: COMMERCIAL

## 2020-12-23 VITALS
HEART RATE: 82 BPM | RESPIRATION RATE: 16 BRPM | TEMPERATURE: 97.9 F | SYSTOLIC BLOOD PRESSURE: 155 MMHG | DIASTOLIC BLOOD PRESSURE: 101 MMHG

## 2020-12-23 PROCEDURE — G0277 HBOT, FULL BODY CHAMBER, 30M: HCPCS

## 2020-12-23 PROCEDURE — 99183 HYPERBARIC OXYGEN THERAPY: CPT | Performed by: EMERGENCY MEDICINE

## 2020-12-23 ASSESSMENT — PAIN SCALES - GENERAL
PAINLEVEL_OUTOF10: 0
PAINLEVEL_OUTOF10: 0

## 2020-12-23 NOTE — PROGRESS NOTES
hyperbaric oxygen treatment #32 of 50 for the diagnosis as stated above. Treatment given at 2 VINCENT for 90 minutes, with no air breaks. Total Treatment Time (min): 108 today, including compression, 100% oxygen at pressure, air breaks if applicable, and decompression. Patient Active Problem List   Diagnosis Code    Hypertension I10    Uncontrolled type 2 diabetes mellitus with diabetic polyneuropathy (HCC) E11.42, E11.65    Cardiomyopathy ([facility] [address] I42.9    Mixed hyperlipidemia E78.2    Diabetic ulcer of left forefoot associated with type 2 diabetes mellitus, with necrosis of bone (HCC) E11.621, L97.524    Allergic rhinitis J30.9    Osteoarthritis M19.90    Surgical wound dehiscence, initial encounter T81.31XA    Chronic osteomyelitis of left foot ([facility] [address] P25.049       In my clinical judgement, ongoing HBO therapy is necessary at this time, given a threat to patient function, limb or life from the current condition. Adjunctive Rx, objective weekly progress and goals of Rx will periodically be updated, on Mondays. Plan     1. Hyperbaric Oxygen - Tim Rodney CHAU Osei tolerated the treatment well today without complications. Continue HBO treatment as outlined above. 2. Other - he asked our HBO nurse today if I could call in a script for some more pain medication (Norco). I saw him yesterday in the wound-care center with Dr. Farzana Carr, and at no time did he mention significant pain then. The foot looked quite good also, stable swelling, not acutely inflamed. I don't personally have a wound-related reason that I believe he should require more opioid analgesics, but advised our nurse to let him know that he could contact Dr. Farzana Carr to discuss if further, if he wanted to. I was present on these premises and immediately available to furnish assistance & direction throughout the procedure.      -- Electronically signed by Renetta Barbosa MD on 12/22/2020 at 9:48 PM

## 2020-12-23 NOTE — PLAN OF CARE
Pt to the [facility] for HBOT. Assessment completed and patient cleared for treatment. Pt to 2.0 VINCENT at a rate of 1.5 psi. Pt to pressure without complaints. Pt watching tv. Nurse at chamber side and will continue to monitor.

## 2020-12-23 NOTE — PROGRESS NOTES
[address]  Hyperbaric Oxygen    Stephania Louis     : 1975    DATE OF VISIT:  2020    Subjective     Stephania Louis is a 39 y.o. male who  has a past medical history of Acute osteomyelitis of right hallux ([facility] [address] (2019), Cellulitis of left foot (2020), CHF (congestive heart failure) ([facility] [address] (2014), Clostridium difficile infection (2016), Diabetic ulcer of right great toe associated with type 2 diabetes mellitus, with necrosis of bone ([facility] [address] (2019), Failed soft 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 toe amputation site (10/26/2020), History of hyperbaric oxygen therapy (10/28/2020), Migraine, Possible perforated tympanic membrane, Post-op hematoma of left foot (2020), Recurrent otitis media, Tear of medial meniscus of left knee, Tobacco use, and Toe osteomyelitis, left ([facility] [address] (2020). He presents to the [facility] today for hyperbaric oxygen treatment of his diabetic foot ulcer  , which is refractory to standard therapy for 30 days. Patient denies fever. Patient denies nausea, vomiting or diarrhea; no ear troubles and no other new complaints; no fears or anxiety regarding treatment today. Objective     Vitals:    20 0801   BP: (!) 140/94   Pulse: 93   Resp: 16   Temp: 98 °F (36.7 °C)   TempSrc: Oral       General:  Alert, cooperative, no distress. Ears: External otic canals are within acceptable limits. Teed grade 1 on the right and 1 on the left pre-treatment. Teed grade 1 on the right and 1 on the left post-treatment. Lungs:  Clear to auscultation bilaterally. Ulcer: Not examined today in HBO, if applicable.       Recent Labs     20  0756 20  0958 20  0756 20  0953 20  0759   POCGLU 215* 158* 208* 180* 144*       Assessment     Stephania Louis is a 39 y.o. male who presented to the [facility] today for hyperbaric oxygen treatment #33 of 50 for the diagnosis as stated above. Treatment given at 2 VINCENT for 90 minutes, with no air breaks. today, including compression, 100% oxygen at pressure, air breaks if applicable, and decompression. Patient Active Problem List   Diagnosis Code    Hypertension I10    Uncontrolled type 2 diabetes mellitus with diabetic polyneuropathy (HCC) E11.42, E11.65    Cardiomyopathy ([facility] [address] I42.9    Mixed hyperlipidemia E78.2    Diabetic ulcer of left forefoot associated with type 2 diabetes mellitus, with necrosis of bone (HCC) E11.621, L97.524    Allergic rhinitis J30.9    Osteoarthritis M19.90    Surgical wound dehiscence, initial encounter T81.31XA    Chronic osteomyelitis of left foot ([facility] G80.556       In my clinical judgement, ongoing HBO therapy is necessary at this time, given a threat to patient function, limb or life from the current condition. Adjunctive Rx, objective weekly progress and goals of Rx will periodically be updated, on Mondays. Plan     1. Hyperbaric Oxygen - Mknakia Satnamshelley CHAU Osei tolerated the treatment well today without complications. Continue HBO treatment as outlined above. 2. Other -     I was present on these premises and immediately available to furnish assistance & direction throughout the procedure.      -- Electronically signed by Natalie Dimas MD on 12/23/2020 at 10:05 AM

## 2020-12-28 ENCOUNTER — HOSPITAL ENCOUNTER (OUTPATIENT)
Dept: HYPERBARIC MEDICINE | Age: 45
Discharge: HOME | End: 2020-12-28
Payer: COMMERCIAL

## 2020-12-28 ENCOUNTER — HOSPITAL ENCOUNTER (OUTPATIENT)
Dept: WOUND CARE | Age: 45
Discharge: HOME | End: 2020-12-28
Payer: COMMERCIAL

## 2020-12-28 VITALS
HEIGHT: 72 IN | TEMPERATURE: 98.2 F | HEART RATE: 95 BPM | DIASTOLIC BLOOD PRESSURE: 81 MMHG | RESPIRATION RATE: 16 BRPM | SYSTOLIC BLOOD PRESSURE: 129 MMHG | WEIGHT: 274 LBS | BODY MASS INDEX: 37.11 KG/M2

## 2020-12-28 VITALS
SYSTOLIC BLOOD PRESSURE: 129 MMHG | RESPIRATION RATE: 16 BRPM | TEMPERATURE: 98.2 F | HEART RATE: 95 BPM | DIASTOLIC BLOOD PRESSURE: 81 MMHG

## 2020-12-28 LAB
GLUCOSE BLD-MCNC: 212 MG/DL (ref 70–99)
GLUCOSE BLD-MCNC: 252 MG/DL (ref 70–99)
PERFORMED ON: ABNORMAL
PERFORMED ON: ABNORMAL

## 2020-12-28 PROCEDURE — 99212 OFFICE O/P EST SF 10 MIN: CPT

## 2020-12-28 PROCEDURE — G0277 HBOT, FULL BODY CHAMBER, 30M: HCPCS

## 2020-12-28 PROCEDURE — 99183 HYPERBARIC OXYGEN THERAPY: CPT | Performed by: INTERNAL MEDICINE

## 2020-12-28 RX ORDER — LIDOCAINE 40 MG/G
CREAM TOPICAL ONCE
Status: DISCONTINUED | OUTPATIENT
Start: 2020-12-28 | End: 2020-12-29 | Stop reason: HOSPADM

## 2020-12-28 ASSESSMENT — PAIN SCALES - GENERAL
PAINLEVEL_OUTOF10: 0

## 2020-12-28 NOTE — PROGRESS NOTES
88 [facility] Progress Note      Justin López     : 1975    DATE OF VISIT:  2020    Subjective:     Justin López is a 39 y.o. male who has a chief complaint of a diabetic ulcer located on the left foot. Has been staying off his foot. No issues with HBO. States sugar levels did pretty well over the holiday. Mr. Alyx Robb has a past medical history of Acute osteomyelitis of right hallux ([address] Cellulitis of left foot, CHF (congestive heart failure) ([address] Clostridium difficile infection, Diabetic ulcer of right great toe associated with type 2 diabetes mellitus, with necrosis of bone ([address] Failed soft 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 toe amputation site, History of hyperbaric oxygen therapy, Migraine, Possible perforated tympanic membrane, Post-op hematoma of left foot, Recurrent otitis media, Tear of medial meniscus of left knee, Tobacco use, and Toe osteomyelitis, left ([address] He has a past surgical history that includes Tonsillectomy; [place] tooth extraction; Knee arthroscopy (Left, ); Toe amputation (Right, 2019); other surgical history (Left, 2020); Toe amputation (Left, 2020); and Toe amputation (Left, 10/22/2020). His family history includes Diabetes in his father and mother; High Blood Pressure in his father, maternal grandmother, maternal uncle, mother, and sister; High Cholesterol in his father, maternal uncle, and mother; Stroke in his father. Mr. Alyx Robb reports that he quit smoking about 15 years ago. He has a 1.00 pack-year smoking history. He quit smokeless tobacco use about 15 years ago. He reports current alcohol use. He reports that he does not use drugs.     His current medication list consists of Aspirin-Acetaminophen-Caffeine, Contour Next EZ, Insulin Glargine (1 Unit Dial), Insulin Pen Needle, PARoxetine, ammonium lactate, blood glucose test strips, carvedilol, furosemide, gabapentin, glimepiride, insulin lispro, insulin lispro (1 Unit Dial), loratadine, losartan, oxymetazoline, sildenafil, simvastatin, tiZANidine, and traZODone. Allergies: Januvia [sitagliptin], Metformin and related, Vancomycin, and Mustard oil [allyl isothiocyanate]    Pertinent items from the review of systems are discussed in the HPI; the remainder of the ROS was reviewed and is negative. Objective:     /81   Pulse 95   Temp 98.2 °F (36.8 °C) (Oral)   Resp 16   Ht 6' (1.829 m)   Wt 274 lb (124.3 kg)   BMI 37.16 kg/m²   General Appearance: alert and oriented to person, place and time, well developed and well- nourished, in no acute distress  Skin: warm and dry, no rash or erythema  Head: normocephalic and atraumatic  Eyes: pupils equal, round, and reactive to light, extraocular eye movements intact, conjunctivae normal  ENT: tympanic membrane, external ear and ear canal normal bilaterally, nose without deformity, nasal mucosa and turbinates normal without polyps  Neck: supple and non-tender without mass, no thyromegaly or thyroid nodules, no cervical lymphadenopathy  Pulmonary/Chest: clear to auscultation bilaterally- no wheezes, rales or rhonchi, normal air movement, no respiratory distress  Cardiovascular: normal rate, regular rhythm, normal S1 and S2, no murmurs, rubs, clicks, or gallops, distal pulses intact, no carotid bruits  Abdomen: soft, non-tender, non-distended, normal bowel sounds, no masses or organomegaly. Dorsalis pedis pulse left palpable  Posterior tibial pulse left palpable  Dorsalis pedis pulse right palpable  Posterior tibial pulse right palpable    Ulcer on the left 2nd digit amputation site epithelialized.      Ulcer on the left hallux amputation site with minimal fibrotic tissue, red granulation tissue, mild serous drainage, no hyperkeratotic rim, no undermining, no tunneling, no purulence, no malodor, no eschar, no periwound maceration, moderate periwound erythema, mild edema, no crepitus, no increase in skin temperature, ulcer probes to soft tissue only    Today's ulcer measurements are in the wound documentation flowsheet. Wound measurements:  [REMOVED] Wound 11/02/20 #6, left second toe, diabetic 2,  full thickness, onset 11/2/2020-Wound Length (cm): 0 cm  Wound 07/31/20 Proximal #1, Left Great toe amp site, DFU, Reyna 4, onset 7/24/20-Wound Length (cm): 2 cm    [REMOVED] Wound 11/02/20 #6, left second toe, diabetic 2,  full thickness, onset 11/2/2020-Wound Width (cm): 0 cm  Wound 07/31/20 Proximal #1, Left Great toe amp site, DFU, Reyna 4, onset 7/24/20-Wound Width (cm): 0.1 cm    [REMOVED] Wound 11/02/20 #6, left second toe, diabetic 2,  full thickness, onset 11/2/2020-Wound Depth (cm): 0 cm  Wound 07/31/20 Proximal #1, Left Great toe amp site, DFU, Reyna 4, onset 7/24/20-Wound Depth (cm): 0.6 cm    LABS  Lab Results   Component Value Date    LABA1C 7.8 10/26/2020     Xray left foot - postop changes      Assessment:     Patient Active Problem List   Diagnosis Code    Hypertension I10    Uncontrolled type 2 diabetes mellitus with diabetic polyneuropathy ([facility] E11.42, E11.65    Cardiomyopathy ([facility] I42.9    Mixed hyperlipidemia E78.2    Diabetic ulcer of left forefoot associated with type 2 diabetes mellitus, with necrosis of bone ([facility]) E11.621, L97.524    Allergic rhinitis J30.9    Osteoarthritis M19.90    Surgical wound dehiscence, initial encounter T81.31XA    Chronic osteomyelitis of left foot ([facility]) B47.800       Assessment of today's active condition(s): osteomyelitis left foot, diabetic foot ulcer left hallux amputation, diabetes mellitus. Factors contributing to occurrence and/or persistence of the chronic ulcer include diabetes and poor glucose control. Sharp debridement is not indicated today, based upon the exam findings in the ulcer(s) above. Discharge plan:     Treatment in the wound care center today:  . Home treatment: good handwashing before and after any dressing changes.

## 2020-12-29 NOTE — PROGRESS NOTES
88 [facility] Progress Note      Mike Siddiqui     : 1975    DATE OF VISIT:  2020    Subjective:     Mike Siddiqui is a 39 y.o. male who has a chief complaint of a diabetic ulcer located on the left foot. Has been staying off his foot. No issues with HBO. States feeling well this week. Happy with progress in the foot. Mr. Haroon Ashley has a past medical history of Acute osteomyelitis of right hallux ([address] Cellulitis of left foot, CHF (congestive heart failure) ([address] Clostridium difficile infection, Diabetic ulcer of right great toe associated with type 2 diabetes mellitus, with necrosis of bone ([address] Failed soft 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 toe amputation site, History of hyperbaric oxygen therapy, Migraine, Possible perforated tympanic membrane, Post-op hematoma of left foot, Recurrent otitis media, Tear of medial meniscus of left knee, Tobacco use, and Toe osteomyelitis, left ([address] He has a past surgical history that includes Tonsillectomy; [place] tooth extraction; Knee arthroscopy (Left, [id number]); Toe amputation (Right, 2019); other surgical history (Left, 2020); Toe amputation (Left, 2020); and Toe amputation (Left, 10/22/2020). His family history includes Diabetes in his father and mother; High Blood Pressure in his father, maternal grandmother, maternal uncle, mother, and sister; High Cholesterol in his father, maternal uncle, and mother; Stroke in his father. Mr. Haroon Ashley reports that he quit smoking about 15 years ago. He has a 1.00 pack-year smoking history. He quit smokeless tobacco use about 15 years ago. He reports current alcohol use. He reports that he does not use drugs.     His current medication list consists of Aspirin-Acetaminophen-Caffeine, Contour Next EZ, Insulin Glargine (1 Unit Dial), Insulin Pen Needle, PARoxetine, ammonium lactate, blood glucose test strips, carvedilol, furosemide, gabapentin, glimepiride, insulin lispro, insulin lispro (1 Unit Dial), loratadine, losartan, oxymetazoline, sildenafil, simvastatin, tiZANidine, and traZODone. Allergies: Januvia [sitagliptin], Metformin and related, Vancomycin, and Mustard oil [allyl isothiocyanate]    Pertinent items from the review of systems are discussed in the HPI; the remainder of the ROS was reviewed and is negative. Objective:     BP (!) 143/97   Pulse 90   Temp 98.2 °F (36.8 °C) (Oral)   Resp 16   Ht 6' (1.829 m)   Wt 274 lb (124.3 kg)   BMI 37.16 kg/m²   General Appearance: alert and oriented to person, place and time, well developed and well- nourished, in no acute distress  Skin: warm and dry, no rash or erythema  Head: normocephalic and atraumatic  Eyes: pupils equal, round, and reactive to light, extraocular eye movements intact, conjunctivae normal  ENT: tympanic membrane, external ear and ear canal normal bilaterally, nose without deformity, nasal mucosa and turbinates normal without polyps  Neck: supple and non-tender without mass, no thyromegaly or thyroid nodules, no cervical lymphadenopathy  Pulmonary/Chest: clear to auscultation bilaterally- no wheezes, rales or rhonchi, normal air movement, no respiratory distress  Cardiovascular: normal rate, regular rhythm, normal S1 and S2, no murmurs, rubs, clicks, or gallops, distal pulses intact, no carotid bruits  Abdomen: soft, non-tender, non-distended, normal bowel sounds, no masses or organomegaly.      Dorsalis pedis pulse left palpable  Posterior tibial pulse left palpable  Dorsalis pedis pulse right palpable  Posterior tibial pulse right palpable    Ulcer on the left 2nd digit amputation site with no fibrotic tissue, red granulation tissue, mild serous drainage, no hyperkeratotic rim, no undermining, no tunneling, no purulence, no malodor, no eschar, no periwound maceration, mild periwound erythema, mild edema, no crepitus, no increase in skin temperature, ulcer probes to soft tissue only     Ulcer

## 2020-12-29 NOTE — PROGRESS NOTES
[address]  Hyperbaric Oxygen    Justin López     : 1975    DATE OF VISIT:  2020    Subjective     Justin López is a 39 y.o. male who  has a past medical history of Acute osteomyelitis of right hallux ([facility] (2019), Cellulitis of left foot (2020), CHF (congestive heart failure) ([facility] (2014), Clostridium difficile infection (2016), Diabetic ulcer of right great toe associated with type 2 diabetes mellitus, with necrosis of bone ([facility] (2019), Failed soft 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 toe amputation site (10/26/2020), History of hyperbaric oxygen therapy (10/28/2020), Migraine, Possible perforated tympanic membrane, Post-op hematoma of left foot (2020), Recurrent otitis media, Tear of medial meniscus of left knee, Tobacco use, and Toe osteomyelitis, left ([facility] (2020). He presents to the [facility] today for hyperbaric oxygen treatment of his Reyna 3 DFU, which is refractory to standard therapy for 30 days. Patient denies fever. Patient denies nausea, vomiting or diarrhea; no ear troubles and no other new complaints; no fears or anxiety regarding treatment today. Objective     Vitals:    20 0800 20 0957   BP: 133/83 129/81   Pulse: 97 95   Resp: 16 16   Temp: 98.8 °F (37.1 °C) 98.2 °F (36.8 °C)   TempSrc: Oral Oral       General:  Alert, cooperative, no distress. Ears: External otic canals are within acceptable limits. Teed grade 0 on the right and 1 on the left pre-treatment. Teed grade 0 on the right and 1 on the left post-treatment. Lungs:  Clear to auscultation bilaterally. Ulcer: Pedal lymphedema milder than it had been. No cellulitis or angitis or fluctuance, no evolving deformities. Second toe amp site healed with a bit of fragile hyperkeratosis. First ray site smaller, red, granular, depth down to just 0.6 cm, no definite necrotic tissue noted, just serous exudate.       Recent Labs

## 2020-12-30 ENCOUNTER — HOSPITAL ENCOUNTER (OUTPATIENT)
Dept: HYPERBARIC MEDICINE | Age: 45
Discharge: HOME | End: 2020-12-30
Payer: COMMERCIAL

## 2020-12-30 VITALS
SYSTOLIC BLOOD PRESSURE: 129 MMHG | RESPIRATION RATE: 18 BRPM | DIASTOLIC BLOOD PRESSURE: 84 MMHG | HEART RATE: 89 BPM | TEMPERATURE: 98.1 F

## 2020-12-30 PROCEDURE — G0277 HBOT, FULL BODY CHAMBER, 30M: HCPCS

## 2020-12-30 PROCEDURE — 99183 HYPERBARIC OXYGEN THERAPY: CPT | Performed by: INTERNAL MEDICINE

## 2020-12-30 ASSESSMENT — PAIN SCALES - GENERAL
PAINLEVEL_OUTOF10: 0
PAINLEVEL_OUTOF10: 0

## 2020-12-30 NOTE — PLAN OF CARE
Pt to the [address] [place] [address],[address] for HBOT. Assessment competed and patient cleared for treatment. Pt to 2.0 VINCENT at rate of 1.5 psi. Pt to pressure without complaints and is watching tv. Nurse at chamber side and will continue to monitor.

## 2020-12-31 ENCOUNTER — HOSPITAL ENCOUNTER (OUTPATIENT)
Dept: HYPERBARIC MEDICINE | Age: 45
Discharge: HOME | End: 2020-12-31
Payer: COMMERCIAL

## 2020-12-31 VITALS
RESPIRATION RATE: 18 BRPM | DIASTOLIC BLOOD PRESSURE: 98 MMHG | SYSTOLIC BLOOD PRESSURE: 151 MMHG | HEART RATE: 94 BPM | TEMPERATURE: 98.3 F

## 2020-12-31 LAB
GLUCOSE BLD-MCNC: 219 MG/DL (ref 70–99)
GLUCOSE BLD-MCNC: 230 MG/DL (ref 70–99)
PERFORMED ON: ABNORMAL
PERFORMED ON: ABNORMAL

## 2020-12-31 PROCEDURE — 99183 HYPERBARIC OXYGEN THERAPY: CPT | Performed by: INTERNAL MEDICINE

## 2020-12-31 PROCEDURE — G0277 HBOT, FULL BODY CHAMBER, 30M: HCPCS

## 2020-12-31 ASSESSMENT — PAIN SCALES - GENERAL
PAINLEVEL_OUTOF10: 0
PAINLEVEL_OUTOF10: 0

## 2020-12-31 NOTE — PLAN OF CARE
Patient seen for HBOT treatment  36 / 48    today. Patient assessment complete and cleared for HBOT. Patient was compressed in HBO chamber to 2.0 VINCENT at 1.5 psi/min. Patient is tolerating treatment well and is currently resting comfortably and watching television. HBO RN at chamber side, will continue to monitor.

## 2020-12-31 NOTE — PROGRESS NOTES
#35 of 50 for the diagnosis as stated above. Treatment ordered at 2 VINCENT for 90 minutes, with no air breaks. Total Treatment Time (min): 95 today, including compression, 100% oxygen at pressure, air breaks if applicable, and decompression. Treatment cut short because of a bit of GI distress and onset of diarrhea. Patient Active Problem List   Diagnosis Code    Hypertension I10    Uncontrolled type 2 diabetes mellitus with diabetic polyneuropathy (H[facility]) E11.42, E11.65    Cardiomyopathy ([facility] I42.9    Mixed hyperlipidemia E78.2    Diabetic ulcer of left forefoot associated with type 2 diabetes mellitus, with necrosis of bone ([facility]CC) E11.621, L97.524    Allergic rhinitis J30.9    Osteoarthritis M19.90    Surgical wound dehiscence, initial encounter T81.31XA    Chronic osteomyelitis of left foot ([facility] G77.804       In my clinical judgement, ongoing HBO therapy is necessary at this time, given a threat to patient function, limb or life from the current condition. Adjunctive Rx, objective weekly progress and goals of Rx will periodically be updated, on Mondays. Plan     1. Hyperbaric Oxygen - Berta Osei tolerated the treatment well today without complications. Continue HBO treatment as outlined above. 2. Other - HBO tomorrow, but then he requested not to dive on Monday until Dr. Rosaura Burns and I evaluate him, to see if we really need to continues dives, which I think is reasonable -- wound IS getting quite close to being healed. I was present on these premises and immediately available to furnish assistance & direction throughout the procedure.      -- Electronically signed by Simon Wei MD on 12/30/2020 at 10:07 PM

## 2021-01-01 ENCOUNTER — OFFICE VISIT (OUTPATIENT)
Dept: INTERNAL MEDICINE CLINIC | Age: 46
End: 2021-01-01

## 2021-01-01 VITALS
HEART RATE: 70 BPM | RESPIRATION RATE: 18 BRPM | HEIGHT: 72 IN | WEIGHT: 282 LBS | SYSTOLIC BLOOD PRESSURE: 130 MMHG | DIASTOLIC BLOOD PRESSURE: 90 MMHG | BODY MASS INDEX: 38.19 KG/M2

## 2021-01-01 DIAGNOSIS — I50.22 CHRONIC SYSTOLIC CONGESTIVE HEART FAILURE (HCC): ICD-10-CM

## 2021-01-01 DIAGNOSIS — Z79.4 TYPE 2 DIABETES MELLITUS WITH DIABETIC POLYNEUROPATHY, WITH LONG-TERM CURRENT USE OF INSULIN (HCC): Primary | ICD-10-CM

## 2021-01-01 DIAGNOSIS — E11.42 DIABETIC PERIPHERAL NEUROPATHY ASSOCIATED WITH TYPE 2 DIABETES MELLITUS (HCC): ICD-10-CM

## 2021-01-01 DIAGNOSIS — E11.42 TYPE 2 DIABETES MELLITUS WITH DIABETIC POLYNEUROPATHY, WITH LONG-TERM CURRENT USE OF INSULIN (HCC): ICD-10-CM

## 2021-01-01 DIAGNOSIS — E11.42 TYPE 2 DIABETES MELLITUS WITH DIABETIC POLYNEUROPATHY, WITH LONG-TERM CURRENT USE OF INSULIN (HCC): Primary | ICD-10-CM

## 2021-01-01 DIAGNOSIS — I10 ESSENTIAL HYPERTENSION: ICD-10-CM

## 2021-01-01 DIAGNOSIS — F41.9 ANXIETY: ICD-10-CM

## 2021-01-01 DIAGNOSIS — Z79.4 TYPE 2 DIABETES MELLITUS WITH DIABETIC POLYNEUROPATHY, WITH LONG-TERM CURRENT USE OF INSULIN (HCC): ICD-10-CM

## 2021-01-01 DIAGNOSIS — E78.2 MIXED HYPERLIPIDEMIA: ICD-10-CM

## 2021-01-01 PROCEDURE — G8484 FLU IMMUNIZE NO ADMIN: HCPCS | Performed by: INTERNAL MEDICINE

## 2021-01-01 PROCEDURE — 1036F TOBACCO NON-USER: CPT | Performed by: INTERNAL MEDICINE

## 2021-01-01 PROCEDURE — 2022F DILAT RTA XM EVC RTNOPTHY: CPT | Performed by: INTERNAL MEDICINE

## 2021-01-01 PROCEDURE — 99212 OFFICE O/P EST SF 10 MIN: CPT | Performed by: INTERNAL MEDICINE

## 2021-01-01 PROCEDURE — G8427 DOCREV CUR MEDS BY ELIG CLIN: HCPCS | Performed by: INTERNAL MEDICINE

## 2021-01-01 PROCEDURE — G8417 CALC BMI ABV UP PARAM F/U: HCPCS | Performed by: INTERNAL MEDICINE

## 2021-01-01 PROCEDURE — 3051F HG A1C>EQUAL 7.0%<8.0%: CPT | Performed by: INTERNAL MEDICINE

## 2021-01-01 RX ORDER — LOSARTAN POTASSIUM 50 MG/1
50 TABLET ORAL DAILY
Qty: 90 TABLET | Refills: 1 | Status: SHIPPED | OUTPATIENT
Start: 2021-01-01 | End: 2022-01-01

## 2021-01-01 RX ORDER — FUROSEMIDE 20 MG/1
20 TABLET ORAL DAILY
Qty: 90 TABLET | Refills: 0 | Status: ON HOLD | OUTPATIENT
Start: 2021-01-01 | End: 2022-01-01 | Stop reason: HOSPADM

## 2021-01-01 RX ORDER — ROSUVASTATIN CALCIUM 20 MG/1
20 TABLET, COATED ORAL NIGHTLY
Qty: 90 TABLET | Refills: 0 | Status: ON HOLD | OUTPATIENT
Start: 2021-01-01 | End: 2022-01-01 | Stop reason: HOSPADM

## 2021-01-01 RX ORDER — INSULIN GLARGINE 300 U/ML
75 INJECTION, SOLUTION SUBCUTANEOUS NIGHTLY
Qty: 24 PEN | Refills: 1 | Status: ON HOLD | OUTPATIENT
Start: 2021-01-01 | End: 2022-01-01

## 2021-01-01 RX ORDER — GABAPENTIN 400 MG/1
CAPSULE ORAL
Qty: 540 CAPSULE | Refills: 0 | Status: SHIPPED | OUTPATIENT
Start: 2021-01-01 | End: 2021-01-01

## 2021-01-01 RX ORDER — GLIMEPIRIDE 4 MG/1
4 TABLET ORAL
Qty: 90 TABLET | Refills: 0 | Status: ON HOLD | OUTPATIENT
Start: 2021-01-01 | End: 2022-01-01 | Stop reason: HOSPADM

## 2021-01-01 RX ORDER — LOSARTAN POTASSIUM 50 MG/1
50 TABLET ORAL DAILY
Qty: 30 TABLET | Refills: 0 | Status: SHIPPED | OUTPATIENT
Start: 2021-01-01 | End: 2021-01-01 | Stop reason: SDUPTHER

## 2021-01-01 RX ORDER — INSULIN LISPRO 200 [IU]/ML
25 INJECTION, SOLUTION SUBCUTANEOUS
Qty: 5 PEN | Refills: 2 | Status: ON HOLD | OUTPATIENT
Start: 2021-01-01 | End: 2022-01-01 | Stop reason: HOSPADM

## 2021-01-01 RX ORDER — TRAZODONE HYDROCHLORIDE 50 MG/1
50 TABLET ORAL NIGHTLY
Qty: 90 TABLET | Refills: 0 | Status: ON HOLD | OUTPATIENT
Start: 2021-01-01 | End: 2022-01-01 | Stop reason: HOSPADM

## 2021-01-01 RX ORDER — TIZANIDINE 4 MG/1
8 TABLET ORAL NIGHTLY
Qty: 180 TABLET | Refills: 0 | Status: SHIPPED | OUTPATIENT
Start: 2021-01-01 | End: 2022-01-01

## 2021-01-01 RX ORDER — GABAPENTIN 400 MG/1
CAPSULE ORAL
Qty: 540 CAPSULE | Refills: 0 | Status: ON HOLD | OUTPATIENT
Start: 2021-01-01 | End: 2022-01-01 | Stop reason: HOSPADM

## 2021-01-01 RX ORDER — PAROXETINE 10 MG/1
10 TABLET, FILM COATED ORAL EVERY MORNING
Qty: 90 TABLET | Refills: 0 | Status: ON HOLD | OUTPATIENT
Start: 2021-01-01 | End: 2022-01-01 | Stop reason: SDUPTHER

## 2021-01-01 RX ORDER — CARVEDILOL 12.5 MG/1
12.5 TABLET ORAL 2 TIMES DAILY WITH MEALS
Qty: 180 TABLET | Refills: 0 | Status: ON HOLD | OUTPATIENT
Start: 2021-01-01 | End: 2022-01-01 | Stop reason: HOSPADM

## 2021-01-04 ENCOUNTER — HOSPITAL ENCOUNTER (OUTPATIENT)
Dept: WOUND CARE | Age: 46
Discharge: HOME | End: 2021-01-04
Payer: COMMERCIAL

## 2021-01-04 ENCOUNTER — HOSPITAL ENCOUNTER (OUTPATIENT)
Dept: HYPERBARIC MEDICINE | Age: 46
End: 2021-01-04
Payer: COMMERCIAL

## 2021-01-04 VITALS
SYSTOLIC BLOOD PRESSURE: 150 MMHG | WEIGHT: 278.4 LBS | BODY MASS INDEX: 37.71 KG/M2 | DIASTOLIC BLOOD PRESSURE: 89 MMHG | TEMPERATURE: 98 F | HEART RATE: 103 BPM | RESPIRATION RATE: 16 BRPM | HEIGHT: 72 IN

## 2021-01-04 PROCEDURE — 99212 OFFICE O/P EST SF 10 MIN: CPT

## 2021-01-04 RX ORDER — LIDOCAINE 40 MG/G
CREAM TOPICAL ONCE
Status: DISCONTINUED | OUTPATIENT
Start: 2021-01-04 | End: 2021-01-05 | Stop reason: HOSPADM

## 2021-01-04 ASSESSMENT — PAIN SCALES - GENERAL: PAINLEVEL_OUTOF10: 0

## 2021-01-04 NOTE — PLAN OF CARE
Pt to the [facility] for follow up appointment. Wound measuring smaller. Pt to stop HBOT per Dr Vivien Leung. Pt to have weekly dressing applied today. Pt to follow up in the [facility] in 1 week. Discharge instructions reviewed with patient, all questions answered, copy given to patient. Dressings were applied to all wounds per M.D. Instructions at this visit.

## 2021-01-05 NOTE — PROGRESS NOTES
Brief HBO progress note --    I saw Mr. Marlene Hernandez briefly during his wound-care follow-up appointment with Dr. Freida Elizabeth. Overall doing well, little pain, still some mild-mod swelling. No signs of soft tissue infection. Wound smaller again, depth minimal now. Will be stopping HBO therapy at this time, and expect him to heal in a couple of weeks with ongoing standard care. Important to keep focused on glucoses, for the long-term. Regular podiatric F/U with Dr. Freida Elizabeth. Would benefit from diabetic shoes and inserts once healed. Encouraged him to get an updated eye exam given his DM and HTN, but would wait a couple of months, as long as he has no acute or worsening visual complaints, to give some time for HBO-induced myopia to reverse. I can always see him back in the future if needed.     Electronically signed by Avril Perez MD on 1/5/2021 at 12:42 PM

## 2021-01-09 NOTE — PROGRESS NOTES
88 [facility] Progress Note      Diego Found     : 1975    DATE OF VISIT:  2021    Subjective:     Diego Lara is a 39 y.o. male who has a chief complaint of a diabetic ulcer located on the left foot. Has been staying off his foot. States sugar levels doing good at this time. Has some pain at times but consistent with his neuropathy. Mr. Roderick Andrade has a past medical history of Acute osteomyelitis of right hallux ([address] Cellulitis of left foot, CHF (congestive heart failure) ([address] Clostridium difficile infection, Diabetic ulcer of right great toe associated with type 2 diabetes mellitus, with necrosis of bone ([address] Failed soft 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 toe amputation site, History of hyperbaric oxygen therapy, Migraine, Possible perforated tympanic membrane, Post-op hematoma of left foot, Recurrent otitis media, Tear of medial meniscus of left knee, Tobacco use, and Toe osteomyelitis, left ([address] He has a past surgical history that includes Tonsillectomy; [place] tooth extraction; Knee arthroscopy (Left, [id number]); Toe amputation (Right, 2019); other surgical history (Left, 2020); Toe amputation (Left, 2020); and Toe amputation (Left, 10/22/2020). His family history includes Diabetes in his father and mother; High Blood Pressure in his father, maternal grandmother, maternal uncle, mother, and sister; High Cholesterol in his father, maternal uncle, and mother; Stroke in his father. Mr. Roderick Andrade reports that he quit smoking about 15 years ago. He has a 1.00 pack-year smoking history. He quit smokeless tobacco use about 15 years ago. He reports current alcohol use. He reports that he does not use drugs.     His current medication list consists of Aspirin-Acetaminophen-Caffeine, Contour Next EZ, Insulin Glargine (1 Unit Dial), Insulin Pen Needle, PARoxetine, ammonium lactate, blood glucose test strips, carvedilol, furosemide, gabapentin, glimepiride, insulin lispro, insulin lispro (1 Unit Dial), loratadine, losartan, oxymetazoline, sildenafil, simvastatin, tiZANidine, and traZODone. Allergies: Januvia [sitagliptin], Metformin and related, Vancomycin, and Mustard oil [allyl isothiocyanate]    Pertinent items from the review of systems are discussed in the HPI; the remainder of the ROS was reviewed and is negative. Objective:     BP (!) 150/89   Pulse 103   Temp 98 °F (36.7 °C) (Oral)   Resp 16   Ht 6' (1.829 m)   Wt 278 lb 6.4 oz (126.3 kg)   BMI 37.76 kg/m²   General Appearance: alert and oriented to person, place and time, well developed and well- nourished, in no acute distress  Skin: warm and dry, no rash or erythema  Head: normocephalic and atraumatic  Eyes: pupils equal, round, and reactive to light, extraocular eye movements intact, conjunctivae normal  ENT: tympanic membrane, external ear and ear canal normal bilaterally, nose without deformity, nasal mucosa and turbinates normal without polyps  Neck: supple and non-tender without mass, no thyromegaly or thyroid nodules, no cervical lymphadenopathy  Pulmonary/Chest: clear to auscultation bilaterally- no wheezes, rales or rhonchi, normal air movement, no respiratory distress  Cardiovascular: normal rate, regular rhythm, normal S1 and S2, no murmurs, rubs, clicks, or gallops, distal pulses intact, no carotid bruits  Abdomen: soft, non-tender, non-distended, normal bowel sounds, no masses or organomegaly. Dorsalis pedis pulse left palpable  Posterior tibial pulse left palpable  Dorsalis pedis pulse right palpable  Posterior tibial pulse right palpable    Ulcer on the left 2nd digit amputation site epithelialized.      Ulcer on the left hallux amputation site with minimal fibrotic tissue, red granulation tissue, mild serous drainage, no hyperkeratotic rim, no undermining, no tunneling, no purulence, no malodor, no eschar, no periwound maceration, minimal periwound erythema, mild Dressing type for home: Iodoform and dry dressing applied to remain intact for 1 week. Written discharge instructions given to patient. Offload ulcer(s) as directed. Elevate leg(s) as directed. Follow up in 1 week. Needs to control glucose levels. HBO as per Dr. Paresh Souza. Dr. Paresh Souza recommended stopping HBO today. Discussed in room with Dr. Paresh Souza.           Electronically signed by Sisi Barone DPM on 1/9/2021 at 9:52 AM.

## 2021-01-11 ENCOUNTER — HOSPITAL ENCOUNTER (OUTPATIENT)
Dept: WOUND CARE | Age: 46
Discharge: HOME | End: 2021-01-11
Payer: COMMERCIAL

## 2021-01-11 ENCOUNTER — TELEPHONE (OUTPATIENT)
Dept: INTERNAL MEDICINE CLINIC | Age: 46
End: 2021-01-11

## 2021-01-11 VITALS
RESPIRATION RATE: 16 BRPM | TEMPERATURE: 98.4 F | SYSTOLIC BLOOD PRESSURE: 132 MMHG | BODY MASS INDEX: 37.65 KG/M2 | WEIGHT: 278 LBS | HEART RATE: 98 BPM | HEIGHT: 72 IN | DIASTOLIC BLOOD PRESSURE: 94 MMHG

## 2021-01-11 PROCEDURE — 99212 OFFICE O/P EST SF 10 MIN: CPT

## 2021-01-11 ASSESSMENT — PAIN SCALES - GENERAL: PAINLEVEL_OUTOF10: 0

## 2021-01-11 NOTE — TELEPHONE ENCOUNTER
----- Message from Halie Joe MD sent at 1/11/2021 12:49 PM EST -----  Contact: Melonie Jackson appt tomorrow 845 am  ----- Message -----  From: Kathy Che  Sent: 1/11/2021   9:27 AM EST  To: Halie Joe MD    Pt wife called stating that they think the pt has Shingles. Pt noticed it  on Saturday. Pt has a spot on his right shoulder blade that is the size of a 50 cent piece with the area around it (an inch away) swollen. Pt has nerve pain shooting down his shoulder blade, and up into his ear and back of neck. Pt wants to know if you want to see him or not. Please advise.     [address]: Jennifer Rodríguez [address] [place] [address] [phone] Nile Dial [phone]    Future appt- 3/11/2021  Past appt- 11/10/2020

## 2021-01-11 NOTE — PLAN OF CARE
Pt to the [address] [place] [address] for follow up appointment. Wound healed but will wrap foot again this week for protection. Pt to follow up in the [address] [place] [address],[address] in 1 week. Pt to call PCP regarding Shingles. Discharge instructions reviewed with patient, all questions answered, copy given to patient. Dressings were applied to all wounds per M.D. Instructions at this visit.

## 2021-01-11 NOTE — PROGRESS NOTES
[facility] Progress Note      Iker Amaya     : 1975    DATE OF VISIT:  2021    Subjective:     Iker Amaya is a 39 y.o. male who has a chief complaint of a diabetic ulcer located on the left foot. Has been staying off his foot most of he time. States sugar levels doing good at this time. States foot feels pretty good at this time. Mr. Isael Campbell has a past medical history of Acute osteomyelitis of right hallux ([address] Cellulitis of left foot, CHF (congestive heart failure) ([address] Clostridium difficile infection, Diabetic ulcer of right great toe associated with type 2 diabetes mellitus, with necrosis of bone ([address] Failed soft 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 toe amputation site, History of hyperbaric oxygen therapy, Migraine, Possible perforated tympanic membrane, Post-op hematoma of left foot, Recurrent otitis media, Tear of medial meniscus of left knee, Tobacco use, and Toe osteomyelitis, left ([address] He has a past surgical history that includes Tonsillectomy; [place] tooth extraction; Knee arthroscopy (Left, [id number]); Toe amputation (Right, 2019); other surgical history (Left, 2020); Toe amputation (Left, 2020); and Toe amputation (Left, 10/22/2020). His family history includes Diabetes in his father and mother; High Blood Pressure in his father, maternal grandmother, maternal uncle, mother, and sister; High Cholesterol in his father, maternal uncle, and mother; Stroke in his father. Mr. Isael Campbell reports that he quit smoking about 15 years ago. He has a 1.00 pack-year smoking history. He quit smokeless tobacco use about 15 years ago. He reports current alcohol use. He reports that he does not use drugs.     His current medication list consists of Aspirin-Acetaminophen-Caffeine, Contour Next EZ, Insulin Glargine (1 Unit Dial), Insulin Pen Needle, PARoxetine, ammonium lactate, blood glucose test strips, carvedilol, furosemide, gabapentin, glimepiride, insulin lispro, insulin lispro (1 Unit Dial), loratadine, losartan, oxymetazoline, sildenafil, simvastatin, tiZANidine, and traZODone. Allergies: Januvia [sitagliptin], Metformin and related, Vancomycin, and Mustard oil [allyl isothiocyanate]    Pertinent items from the review of systems are discussed in the HPI; the remainder of the ROS was reviewed and is negative. Objective:     BP (!) 132/94   Pulse 98   Temp 98.4 °F (36.9 °C) (Oral)   Resp 16   Ht 6' (1.829 m)   Wt 278 lb (126.1 kg)   BMI 37.70 kg/m²   General Appearance: alert and oriented to person, place and time, well developed and well- nourished, in no acute distress  Skin: warm and dry, no rash or erythema  Head: normocephalic and atraumatic  Eyes: pupils equal, round, and reactive to light, extraocular eye movements intact, conjunctivae normal  ENT: tympanic membrane, external ear and ear canal normal bilaterally, nose without deformity, nasal mucosa and turbinates normal without polyps  Neck: supple and non-tender without mass, no thyromegaly or thyroid nodules, no cervical lymphadenopathy  Pulmonary/Chest: clear to auscultation bilaterally- no wheezes, rales or rhonchi, normal air movement, no respiratory distress  Cardiovascular: normal rate, regular rhythm, normal S1 and S2, no murmurs, rubs, clicks, or gallops, distal pulses intact, no carotid bruits  Abdomen: soft, non-tender, non-distended, normal bowel sounds, no masses or organomegaly. Dorsalis pedis pulse left palpable  Posterior tibial pulse left palpable  Dorsalis pedis pulse right palpable  Posterior tibial pulse right palpable    Ulcer on the left 2nd digit amputation site epithelialized. Ulcer on the left hallux amputation site with thin epithelial tissue noted. No signs of infection noted. Today's ulcer measurements are in the wound documentation flowsheet.      Wound measurements:  Wound 07/31/20 Proximal #1, Left Great toe amp site, DFU, Reyna 4, onset 7/24/20-Wound Length (cm): 0 cm    Wound 07/31/20 Proximal #1, Left Great toe amp site, DFU, Reyna 4, onset 7/24/20-Wound Width (cm): 0 cm    Wound 07/31/20 Proximal #1, Left Great toe amp site, DFU, Reyna 4, onset 7/24/20-Wound Depth (cm): 0 cm    LABS  Lab Results   Component Value Date    LABA1C 7.8 10/26/2020     Xray left foot - postop changes      Assessment:     Patient Active Problem List   Diagnosis Code    Hypertension I10    Uncontrolled type 2 diabetes mellitus with diabetic polyneuropathy ([facility] [address] E11.42, E11.65    Cardiomyopathy ([facility] I42.9    Mixed hyperlipidemia E78.2    Diabetic ulcer of left forefoot associated with type 2 diabetes mellitus, with necrosis of bone ([facility]) E11.621, L97.524    Allergic rhinitis J30.9    Osteoarthritis M19.90    Surgical wound dehiscence, initial encounter T81.31XA    Chronic osteomyelitis of left foot ([facility]) Q04.626       Assessment of today's active condition(s): osteomyelitis left foot, diabetic foot ulcer left hallux amputation, diabetes mellitus. Factors contributing to occurrence and/or persistence of the chronic ulcer include diabetes and poor glucose control. Sharp debridement is not indicated today, based upon the exam findings in the ulcer(s) above. Discharge plan:     Treatment in the wound care center today: Wound 07/31/20 Proximal #1, Left Great toe amp site, DFU, Reyna 4, onset 7/24/20-Dressing/Treatment: Other (comment)(Betadine, gauze,Kerlix,Coban-no compression). Home treatment: good handwashing before and after any dressing changes. Cleanse ulcer with saline or soap & water before dressing change. May use Vaseline (petrolatum), Aquaphor, Aveeno, CeraVe, Cetaphil, Eucerin, Lubriderm, etc for dry skin. Dressing type for home: betadine and dry dressing applied to remain intact for 1 week. Written discharge instructions given to patient. Offload ulcer(s) as directed. Elevate leg(s) as directed. Follow up in 1 week. Needs to control glucose levels.           Electronically signed by Christa Pereira DPM on 1/11/2021 at 9:21 AM.

## 2021-01-12 ENCOUNTER — OFFICE VISIT (OUTPATIENT)
Dept: INTERNAL MEDICINE CLINIC | Age: 46
End: 2021-01-12

## 2021-01-12 ENCOUNTER — TELEPHONE (OUTPATIENT)
Dept: INTERNAL MEDICINE CLINIC | Age: 46
End: 2021-01-12

## 2021-01-12 VITALS
SYSTOLIC BLOOD PRESSURE: 125 MMHG | BODY MASS INDEX: 37.93 KG/M2 | WEIGHT: 280 LBS | TEMPERATURE: 97.3 F | RESPIRATION RATE: 18 BRPM | DIASTOLIC BLOOD PRESSURE: 75 MMHG | HEART RATE: 70 BPM | HEIGHT: 72 IN

## 2021-01-12 DIAGNOSIS — B02.9 HERPES ZOSTER WITHOUT COMPLICATION: Primary | ICD-10-CM

## 2021-01-12 PROCEDURE — G8417 CALC BMI ABV UP PARAM F/U: HCPCS | Performed by: INTERNAL MEDICINE

## 2021-01-12 PROCEDURE — G8427 DOCREV CUR MEDS BY ELIG CLIN: HCPCS | Performed by: INTERNAL MEDICINE

## 2021-01-12 PROCEDURE — G8484 FLU IMMUNIZE NO ADMIN: HCPCS | Performed by: INTERNAL MEDICINE

## 2021-01-12 PROCEDURE — 1036F TOBACCO NON-USER: CPT | Performed by: INTERNAL MEDICINE

## 2021-01-12 PROCEDURE — 99212 OFFICE O/P EST SF 10 MIN: CPT | Performed by: INTERNAL MEDICINE

## 2021-01-12 RX ORDER — VALACYCLOVIR HYDROCHLORIDE 1 G/1
1000 TABLET, FILM COATED ORAL 2 TIMES DAILY
Qty: 14 TABLET | Refills: 0 | Status: SHIPPED | OUTPATIENT
Start: 2021-01-12 | End: 2021-01-19

## 2021-01-12 RX ORDER — LIDOCAINE 4 G/G
1 PATCH TOPICAL DAILY
Qty: 30 PATCH | Refills: 0 | Status: SHIPPED | OUTPATIENT
Start: 2021-01-12 | End: 2021-02-11

## 2021-01-12 NOTE — PROGRESS NOTES
Subjective:      Patient ID: Olena Bray is a 39 y.o. male. HPI      39 y.o. . Male with known h.o DM-2, HTN, cardiomopathy, CHF, neuropathy here for new painful rash that started 3 days ago on top of right scapular region with mild low grade fevers and chills. Reports cluster of blisters very painful and pain radiating to lower neck and mastoid region and to shoulder region. No previous hx of shingles. No recent sick contacts but goes to Hyperbaric therapy for wound care. Reports his wound on foot has completely healed and now completed hyperbaric treatments        DM- 2 IDDM with complications- neuropathy and foot ulcers  S/p right great toe amputation and left great toe and 2nd toe amputations      He has been very careful about sugars and monitoring it 3 times daily and was compliant with insulin for few months  Sugars range  In am upto 120 and 180 , on Toujeo at 75 units nightly and novolog 25 units TID with meals  No low sugars  Reports neuropathy symptoms getting worse       Has h.o non ischemic cardiomyopathy . EF noted low at 20-25 % (2014)and treated with diuresis , b blockers, digoxin  , improved EF on f.w ECHo  Able to work  Normally with no residual dyspnea or pedal edema. No chest pain or nocturnal cough-   Currently on coreg and losartan and lasix  only. Off digoxin. Aldactone         Has severe Neuropathy in extremities with symptoms of tingling and numbness in hands . Palms are hypersensitive. Now on neurontin tid, off tramadol and pain meds. Unable to afford lyrica. Anxiety improved on paxil     HTN -remain stable with meds - on losartan, coreg,   Being compliant    Current Outpatient Medications   Medication Sig Dispense Refill    HUMALOG KWIKPEN 100 UNIT/ML SOPN INJECT 25 UNITS INTO THE SKIN 3 TIMES DAILY (BEFORE MEALS) 15 mL 2    Blood Glucose Monitoring Suppl (CONTOUR NEXT EZ) w/Device KIT Use to test glucose daily.   DX:E11.9 1 kit 0    blood glucose monitor strips Use to test blood glucose daily. DX:E11.9 100 strip 0    glimepiride (AMARYL) 4 MG tablet TAKE 1 TABLET BY MOUTH EVERY MORNING (BEFORE BREAKFAST) 90 tablet 0    Insulin Pen Needle 32G X 6 MM MISC Use with insulin daily . DX:E11.9 100 each 3    blood glucose monitor strips Use to test blood sugar daily. DX:E11.9 100 strip 3    PARoxetine (PAXIL) 10 MG tablet TAKE 1 TABLET BY MOUTH EVERY MORNING 90 tablet 0    carvedilol (COREG) 12.5 MG tablet Take 1 tablet by mouth 2 times daily (with meals) 180 tablet 1    furosemide (LASIX) 20 MG tablet Take 1 tablet by mouth daily 90 tablet 1    Insulin Glargine, 1 Unit Dial, (TOUJEO SOLOSTAR) 300 UNIT/ML SOPN Inject 75 Units into the skin nightly 24 pen 1    tiZANidine (ZANAFLEX) 4 MG tablet Take 2 tablets by mouth nightly 180 tablet 0    traZODone (DESYREL) 50 MG tablet Take 1 tablet by mouth nightly 90 tablet 1    losartan (COZAAR) 50 MG tablet Take 1 tablet by mouth daily 90 tablet 0    gabapentin (NEURONTIN) 400 MG capsule Take 2 capsules by mouth 3 times daily for 90 days. TAKE 2 CAPSULES BY MOUTH THREE TIMES DAILY. 540 capsule 0    oxymetazoline (AFRIN) 0.05 % nasal spray 2 sprays by Nasal route daily Indications: prior to HBO      Aspirin-Acetaminophen-Caffeine (EXCEDRIN MIGRAINE PO) Take 2 capsules by mouth as needed       loratadine (CLARITIN) 10 MG tablet Take 10 mg by mouth nightly as needed       simvastatin (ZOCOR) 20 MG tablet TAKE 1 TABLET BY MOUTH NIGHTLY 30 tablet 3    ammonium lactate (LAC-HYDRIN) 12 % cream Apply topically 2x daily.  1 Tube 6    sildenafil (VIAGRA) 100 MG tablet TAKE 1 TABLET BY MOUTH AS NEEDED FOR ERECTILE DYSFUNCTION 15 tablet 0    insulin lispro (HUMALOG KWIKPEN) 100 UNIT/ML pen Inject 15 Units into the skin 3 times daily (before meals) (Patient taking differently: Inject into the skin 3 times daily (before meals) Patient takes per sliding scale plus he adds 15 units to it) 5 pen 0     No current facility-administered medications Geovany Dsouza MD

## 2021-01-12 NOTE — TELEPHONE ENCOUNTER
----- Message from Archie Sethi MD sent at 1/12/2021 11:09 AM EST -----  200 units per pen  ----- Message -----  From: Nataliia Escobedo  Sent: 1/12/2021  10:58 AM EST  To: Archie Sethi MD    Is Ayana Hernandez 100 unit pen or 200 unit?

## 2021-01-12 NOTE — TELEPHONE ENCOUNTER
----- Message from Latesha Manzano MD sent at 1/12/2021  1:59 PM EST -----  Let him know  ----- Message -----  From: Nallely Calvin  Sent: 1/12/2021   1:38 PM EST  To: Latesha Manzano MD    Rico Trejo is not covered by patient's insurance. ----- Message -----  From: Latesha Manzano MD  Sent: 1/12/2021  11:09 AM EST  To: Nallely Calvin    200 units per pen  ----- Message -----  From: Nallely Calvin  Sent: 1/12/2021  10:58 AM EST  To: Latesha Manzano MD    Is Rico Trejo 100 unit pen or 200 unit?

## 2021-01-18 ENCOUNTER — HOSPITAL ENCOUNTER (OUTPATIENT)
Dept: WOUND CARE | Age: 46
Discharge: HOME | End: 2021-01-18
Payer: COMMERCIAL

## 2021-01-18 VITALS
HEIGHT: 72 IN | SYSTOLIC BLOOD PRESSURE: 126 MMHG | RESPIRATION RATE: 16 BRPM | DIASTOLIC BLOOD PRESSURE: 86 MMHG | HEART RATE: 111 BPM | WEIGHT: 285 LBS | BODY MASS INDEX: 38.6 KG/M2 | TEMPERATURE: 98.7 F

## 2021-01-18 PROCEDURE — 99212 OFFICE O/P EST SF 10 MIN: CPT

## 2021-01-18 ASSESSMENT — PAIN SCALES - GENERAL: PAINLEVEL_OUTOF10: 0

## 2021-01-18 NOTE — PROGRESS NOTES
88 [facility] Progress Note      Khalida Lewis     : 1975    DATE OF VISIT:  2021    Subjective:     Khalida Lewsi is a 39 y.o. male who has a chief complaint of a diabetic ulcer located on the left foot. Has been staying off his foot most of he time. States sugar levels doing good at this time. States foot feels pretty good at this time. Mr. Ishan Funes has a past medical history of Acute osteomyelitis of right hallux ([facility] [address] Cellulitis of left foot, CHF (congestive heart failure) ([address] Clostridium difficile infection, Diabetic ulcer of right great toe associated with type 2 diabetes mellitus, with necrosis of bone ([address] Failed soft 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 toe amputation site, History of hyperbaric oxygen therapy, Migraine, Possible perforated tympanic membrane, Post-op hematoma of left foot, Recurrent otitis media, Tear of medial meniscus of left knee, Tobacco use, and Toe osteomyelitis, left ([address] He has a past surgical history that includes Tonsillectomy; [place] tooth extraction; Knee arthroscopy (Left, [id number]); Toe amputation (Right, 2019); other surgical history (Left, 2020); Toe amputation (Left, 2020); and Toe amputation (Left, 10/22/2020). His family history includes Diabetes in his father and mother; High Blood Pressure in his father, maternal grandmother, maternal uncle, mother, and sister; High Cholesterol in his father, maternal uncle, and mother; Stroke in his father. Mr. Ishan Funes reports that he quit smoking about 15 years ago. He has a 1.00 pack-year smoking history. He quit smokeless tobacco use about 15 years ago. He reports current alcohol use. He reports that he does not use drugs.     His current medication list consists of Aspirin-Acetaminophen-Caffeine, Contour Next EZ, Insulin Glargine (1 Unit Dial), Insulin Pen Needle, PARoxetine, ammonium lactate, blood glucose test strips, carvedilol, furosemide, gabapentin, glimepiride, insulin lispro, insulin lispro (1 Unit Dial), lidocaine, loratadine, losartan, oxymetazoline, sildenafil, simvastatin, tiZANidine, traZODone, and valACYclovir. Allergies: Januvia [sitagliptin], Metformin and related, Vancomycin, and Mustard oil [allyl isothiocyanate]    Pertinent items from the review of systems are discussed in the HPI; the remainder of the ROS was reviewed and is negative. Objective:     /86   Pulse 111   Temp 98.7 °F (37.1 °C) (Oral)   Resp 16   Ht 6' (1.829 m)   Wt 285 lb (129.3 kg)   BMI 38.65 kg/m²   General Appearance: alert and oriented to person, place and time, well developed and well- nourished, in no acute distress  Skin: warm and dry, no rash or erythema  Head: normocephalic and atraumatic  Eyes: pupils equal, round, and reactive to light, extraocular eye movements intact, conjunctivae normal  ENT: tympanic membrane, external ear and ear canal normal bilaterally, nose without deformity, nasal mucosa and turbinates normal without polyps  Neck: supple and non-tender without mass, no thyromegaly or thyroid nodules, no cervical lymphadenopathy  Pulmonary/Chest: clear to auscultation bilaterally- no wheezes, rales or rhonchi, normal air movement, no respiratory distress  Cardiovascular: normal rate, regular rhythm, normal S1 and S2, no murmurs, rubs, clicks, or gallops, distal pulses intact, no carotid bruits  Abdomen: soft, non-tender, non-distended, normal bowel sounds, no masses or organomegaly. Dorsalis pedis pulse left palpable  Posterior tibial pulse left palpable  Dorsalis pedis pulse right palpable  Posterior tibial pulse right palpable    Ulcer on the left 2nd digit amputation site epithelialized. Ulcer on the left hallux amputation site with epithelial tissue noted. No signs of infection noted. Today's ulcer measurements are in the wound documentation flowsheet.      Wound measurements:  Wound 07/31/20 Proximal #1, Left Great toe amp site, DFU, Reyna 4, onset 7/24/20-Wound Length (cm): 0 cm    Wound 07/31/20 Proximal #1, Left Great toe amp site, DFU, Reyna 4, onset 7/24/20-Wound Width (cm): 0 cm    Wound 07/31/20 Proximal #1, Left Great toe amp site, DFU, Reyna 4, onset 7/24/20-Wound Depth (cm): 0 cm    LABS  Lab Results   Component Value Date    LABA1C 7.8 10/26/2020     Xray left foot - postop changes      Assessment:     Patient Active Problem List   Diagnosis Code    Hypertension I10    Uncontrolled type 2 diabetes mellitus with diabetic polyneuropathy ([facility] [address] E11.42, E11.65    Cardiomyopathy ([facility] [address] I42.9    Mixed hyperlipidemia E78.2    Diabetic ulcer of left forefoot associated with type 2 diabetes mellitus, with necrosis of bone ([facility]) E11.621, L97.524    Allergic rhinitis J30.9    Osteoarthritis M19.90    Surgical wound dehiscence, initial encounter T81.31XA    Chronic osteomyelitis of left foot ([facility]) S19.849       Assessment of today's active condition(s): diabetic foot ulcer left hallux amputation, diabetes mellitus. Factors contributing to occurrence and/or persistence of the chronic ulcer include diabetes and poor glucose control. Sharp debridement is not indicated today, based upon the exam findings in the ulcer(s) above. Discharge plan:     Treatment in the wound care center today: Wound 07/31/20 Proximal #1, Left Great toe amp site, DFU, Reyna 4, onset 7/24/20-Dressing/Treatment: Other (comment)(Betadine,gauze,Kerlix,Coban). Home treatment: good handwashing before and after any dressing changes. Cleanse ulcer with saline or soap & water before dressing change. May use Vaseline (petrolatum), Aquaphor, Aveeno, CeraVe, Cetaphil, Eucerin, Lubriderm, etc for dry skin. Dressing type for home: betadine and dry dressing applied to remain intact for 1 week. Written discharge instructions given to patient. Offload ulcer(s) as directed. Elevate leg(s) as directed. Follow up in 1 week.      Needs to control glucose levels. Continued to wrap the foot in order to allow skin to strengthen.          Electronically signed by Linus Alba DPM on 1/18/2021 at 8:55 AM.

## 2021-01-18 NOTE — PLAN OF CARE
Pt to the [facility] for follow up appointment. Incisions healed. Will continue to wrap foot this week. Pt to follow up in the [facility] in 1 week. Discharge instructions reviewed with patient, all questions answered, copy given to patient. Dressings were applied to all wounds per M.D. Instructions at this visit.

## 2021-01-25 ENCOUNTER — HOSPITAL ENCOUNTER (OUTPATIENT)
Dept: WOUND CARE | Age: 46
Discharge: HOME | End: 2021-01-25
Payer: COMMERCIAL

## 2021-01-25 VITALS
BODY MASS INDEX: 38.74 KG/M2 | DIASTOLIC BLOOD PRESSURE: 97 MMHG | SYSTOLIC BLOOD PRESSURE: 150 MMHG | TEMPERATURE: 98.2 F | RESPIRATION RATE: 16 BRPM | HEIGHT: 72 IN | WEIGHT: 286 LBS | HEART RATE: 88 BPM

## 2021-01-25 PROCEDURE — 99212 OFFICE O/P EST SF 10 MIN: CPT

## 2021-01-25 ASSESSMENT — PAIN DESCRIPTION - PROGRESSION: CLINICAL_PROGRESSION: NOT CHANGED

## 2021-01-25 ASSESSMENT — PAIN SCALES - GENERAL
PAINLEVEL_OUTOF10: 1
PAINLEVEL_OUTOF10: 0

## 2021-01-25 ASSESSMENT — PAIN - FUNCTIONAL ASSESSMENT: PAIN_FUNCTIONAL_ASSESSMENT: ACTIVITIES ARE NOT PREVENTED

## 2021-01-25 ASSESSMENT — PAIN DESCRIPTION - FREQUENCY: FREQUENCY: CONTINUOUS

## 2021-01-25 ASSESSMENT — PAIN DESCRIPTION - DESCRIPTORS: DESCRIPTORS: ACHING

## 2021-01-25 NOTE — PROGRESS NOTES
glimepiride, insulin lispro, insulin lispro (1 Unit Dial), lidocaine, loratadine, losartan, oxymetazoline, sildenafil, simvastatin, tiZANidine, and traZODone. Allergies: Januvia [sitagliptin], Metformin and related, Vancomycin, and Mustard oil [allyl isothiocyanate]    Pertinent items from the review of systems are discussed in the HPI; the remainder of the ROS was reviewed and is negative. Objective:     BP (!) 150/97   Pulse 88   Temp 98.2 °F (36.8 °C) (Oral)   Resp 16   Ht 6' (1.829 m)   Wt 286 lb (129.7 kg)   BMI 38.79 kg/m²   General Appearance: alert and oriented to person, place and time, well developed and well- nourished, in no acute distress  Skin: warm and dry, no rash or erythema  Head: normocephalic and atraumatic  Eyes: pupils equal, round, and reactive to light, extraocular eye movements intact, conjunctivae normal  ENT: tympanic membrane, external ear and ear canal normal bilaterally, nose without deformity, nasal mucosa and turbinates normal without polyps  Neck: supple and non-tender without mass, no thyromegaly or thyroid nodules, no cervical lymphadenopathy  Pulmonary/Chest: clear to auscultation bilaterally- no wheezes, rales or rhonchi, normal air movement, no respiratory distress  Cardiovascular: normal rate, regular rhythm, normal S1 and S2, no murmurs, rubs, clicks, or gallops, distal pulses intact, no carotid bruits  Abdomen: soft, non-tender, non-distended, normal bowel sounds, no masses or organomegaly. Dorsalis pedis pulse left palpable  Posterior tibial pulse left palpable  Dorsalis pedis pulse right palpable  Posterior tibial pulse right palpable    Ulcer on the left 2nd digit amputation site epithelialized. Ulcer on the left hallux amputation site with epithelial tissue noted. No signs of infection noted. One single area of serosanguinous drainage noted. Today's ulcer measurements are in the wound documentation flowsheet.      Wound measurements:  Wound 07/31/20 Proximal #1, Left Great toe amp site, DFU, Reyna 4, onset 7/24/20-Wound Length (cm): 0 cm    Wound 07/31/20 Proximal #1, Left Great toe amp site, DFU, Reyna 4, onset 7/24/20-Wound Width (cm): 0 cm    Wound 07/31/20 Proximal #1, Left Great toe amp site, DFU, Reyna 4, onset 7/24/20-Wound Depth (cm): 0 cm    LABS  Lab Results   Component Value Date    LABA1C 7.8 10/26/2020     Xray left foot - postop changes      Assessment:     Patient Active Problem List   Diagnosis Code    Hypertension I10    Uncontrolled type 2 diabetes mellitus with diabetic polyneuropathy ([facility] [address] E11.42, E11.65    Cardiomyopathy ([facility] [address] I42.9    Mixed hyperlipidemia E78.2    Diabetic ulcer of left forefoot associated with type 2 diabetes mellitus, with necrosis of bone ([facility]) E11.621, L97.524    Allergic rhinitis J30.9    Osteoarthritis M19.90    Surgical wound dehiscence, initial encounter T81.31XA    Chronic osteomyelitis of left foot ([facility]) Q01.220       Assessment of today's active condition(s): diabetic foot ulcer left hallux amputation, diabetes mellitus. Factors contributing to occurrence and/or persistence of the chronic ulcer include diabetes and poor glucose control. Sharp debridement is not indicated today, based upon the exam findings in the ulcer(s) above. Discharge plan:     Treatment in the wound care center today:  . Home treatment: good handwashing before and after any dressing changes. Cleanse ulcer with saline or soap & water before dressing change. May use Vaseline (petrolatum), Aquaphor, Aveeno, CeraVe, Cetaphil, Eucerin, Lubriderm, etc for dry skin. Dressing type for home: betadine and dry dressing applied to remain intact for 1 week. Written discharge instructions given to patient. Offload ulcer(s) as directed. Elevate leg(s) as directed. Follow up in 1 week. Needs to control glucose levels. Continued to wrap the foot in order to allow skin to strengthen.      Drainage site most likely secondary to lysing dry skin. However given his risk factors I recommended continuing dressing at this time to allow skin to strengthen.        Electronically signed by Tracie Collins DPM on 1/25/2021 at 8:32 AM.

## 2021-02-01 ENCOUNTER — HOSPITAL ENCOUNTER (OUTPATIENT)
Dept: WOUND CARE | Age: 46
Discharge: HOME | End: 2021-02-01
Payer: COMMERCIAL

## 2021-02-04 RX ORDER — TIZANIDINE 4 MG/1
8 TABLET ORAL NIGHTLY
Qty: 180 TABLET | Refills: 0 | Status: SHIPPED | OUTPATIENT
Start: 2021-02-04 | End: 2021-05-03

## 2021-02-12 RX ORDER — CARVEDILOL 12.5 MG/1
12.5 TABLET ORAL 2 TIMES DAILY WITH MEALS
Qty: 180 TABLET | Refills: 0 | Status: SHIPPED | OUTPATIENT
Start: 2021-02-12 | End: 2021-08-17

## 2021-02-23 RX ORDER — TRAZODONE HYDROCHLORIDE 50 MG/1
50 TABLET ORAL NIGHTLY
Qty: 90 TABLET | Refills: 0 | Status: SHIPPED | OUTPATIENT
Start: 2021-02-23 | End: 2021-06-04

## 2021-03-05 RX ORDER — BLOOD SUGAR DIAGNOSTIC
STRIP MISCELLANEOUS
Qty: 100 STRIP | Refills: 0 | Status: SHIPPED | OUTPATIENT
Start: 2021-03-05

## 2021-03-11 ENCOUNTER — OFFICE VISIT (OUTPATIENT)
Dept: INTERNAL MEDICINE CLINIC | Age: 46
End: 2021-03-11

## 2021-03-11 VITALS
BODY MASS INDEX: 37.38 KG/M2 | HEIGHT: 72 IN | HEART RATE: 70 BPM | SYSTOLIC BLOOD PRESSURE: 130 MMHG | TEMPERATURE: 98.1 F | WEIGHT: 276 LBS | DIASTOLIC BLOOD PRESSURE: 92 MMHG | RESPIRATION RATE: 18 BRPM

## 2021-03-11 DIAGNOSIS — E78.2 MIXED HYPERLIPIDEMIA: ICD-10-CM

## 2021-03-11 DIAGNOSIS — Z79.4 TYPE 2 DIABETES MELLITUS WITH DIABETIC POLYNEUROPATHY, WITH LONG-TERM CURRENT USE OF INSULIN (HCC): ICD-10-CM

## 2021-03-11 DIAGNOSIS — E11.621 DIABETIC ULCER OF TOE OF LEFT FOOT ASSOCIATED WITH TYPE 2 DIABETES MELLITUS, WITH NECROSIS OF BONE (HCC): ICD-10-CM

## 2021-03-11 DIAGNOSIS — E11.42 DIABETIC PERIPHERAL NEUROPATHY ASSOCIATED WITH TYPE 2 DIABETES MELLITUS (HCC): ICD-10-CM

## 2021-03-11 DIAGNOSIS — Z79.4 TYPE 2 DIABETES MELLITUS WITH DIABETIC POLYNEUROPATHY, WITH LONG-TERM CURRENT USE OF INSULIN (HCC): Primary | ICD-10-CM

## 2021-03-11 DIAGNOSIS — I50.22 CHRONIC SYSTOLIC CONGESTIVE HEART FAILURE (HCC): ICD-10-CM

## 2021-03-11 DIAGNOSIS — F41.9 ANXIETY: ICD-10-CM

## 2021-03-11 DIAGNOSIS — I10 ESSENTIAL HYPERTENSION: ICD-10-CM

## 2021-03-11 DIAGNOSIS — E11.42 TYPE 2 DIABETES MELLITUS WITH DIABETIC POLYNEUROPATHY, WITH LONG-TERM CURRENT USE OF INSULIN (HCC): ICD-10-CM

## 2021-03-11 DIAGNOSIS — E11.42 TYPE 2 DIABETES MELLITUS WITH DIABETIC POLYNEUROPATHY, WITH LONG-TERM CURRENT USE OF INSULIN (HCC): Primary | ICD-10-CM

## 2021-03-11 DIAGNOSIS — L97.524 DIABETIC ULCER OF TOE OF LEFT FOOT ASSOCIATED WITH TYPE 2 DIABETES MELLITUS, WITH NECROSIS OF BONE (HCC): ICD-10-CM

## 2021-03-11 LAB
A/G RATIO: 1.8 (ref 1.1–2.2)
ALBUMIN SERPL-MCNC: 4.6 G/DL (ref 3.4–5)
ALP BLD-CCNC: 105 U/L (ref 40–129)
ALT SERPL-CCNC: 24 U/L (ref 10–40)
ANION GAP SERPL CALCULATED.3IONS-SCNC: 15 MMOL/L (ref 3–16)
AST SERPL-CCNC: 15 U/L (ref 15–37)
BILIRUB SERPL-MCNC: 2 MG/DL (ref 0–1)
BUN BLDV-MCNC: 19 MG/DL (ref 7–20)
CALCIUM SERPL-MCNC: 9.4 MG/DL (ref 8.3–10.6)
CHLORIDE BLD-SCNC: 99 MMOL/L (ref 99–110)
CHOLESTEROL, FASTING: 315 MG/DL (ref 0–199)
CO2: 24 MMOL/L (ref 21–32)
CREAT SERPL-MCNC: 0.8 MG/DL (ref 0.9–1.3)
GFR AFRICAN AMERICAN: >60
GFR NON-AFRICAN AMERICAN: >60
GLOBULIN: 2.5 G/DL
GLUCOSE BLD-MCNC: 197 MG/DL (ref 70–99)
HDLC SERPL-MCNC: 26 MG/DL (ref 40–60)
LDL CHOLESTEROL CALCULATED: ABNORMAL MG/DL
LDL CHOLESTEROL DIRECT: 60 MG/DL
POTASSIUM SERPL-SCNC: 4 MMOL/L (ref 3.5–5.1)
SODIUM BLD-SCNC: 138 MMOL/L (ref 136–145)
TOTAL PROTEIN: 7.1 G/DL (ref 6.4–8.2)
TRIGLYCERIDE, FASTING: 1202 MG/DL (ref 0–150)
VLDLC SERPL CALC-MCNC: ABNORMAL MG/DL

## 2021-03-11 PROCEDURE — 1036F TOBACCO NON-USER: CPT | Performed by: INTERNAL MEDICINE

## 2021-03-11 PROCEDURE — 2022F DILAT RTA XM EVC RTNOPTHY: CPT | Performed by: INTERNAL MEDICINE

## 2021-03-11 PROCEDURE — 3046F HEMOGLOBIN A1C LEVEL >9.0%: CPT | Performed by: INTERNAL MEDICINE

## 2021-03-11 PROCEDURE — 99212 OFFICE O/P EST SF 10 MIN: CPT | Performed by: INTERNAL MEDICINE

## 2021-03-11 PROCEDURE — G8417 CALC BMI ABV UP PARAM F/U: HCPCS | Performed by: INTERNAL MEDICINE

## 2021-03-11 PROCEDURE — G8484 FLU IMMUNIZE NO ADMIN: HCPCS | Performed by: INTERNAL MEDICINE

## 2021-03-11 PROCEDURE — G8427 DOCREV CUR MEDS BY ELIG CLIN: HCPCS | Performed by: INTERNAL MEDICINE

## 2021-03-11 RX ORDER — ROSUVASTATIN CALCIUM 20 MG/1
20 TABLET, COATED ORAL NIGHTLY
Qty: 90 TABLET | Refills: 1 | Status: SHIPPED | OUTPATIENT
Start: 2021-03-11 | End: 2021-06-04

## 2021-03-11 NOTE — PROGRESS NOTES
Subjective:      Patient ID: Diego Lara is a 39 y.o. male. HPI      39 y.o. . Male with known h.o DM-2, HTN, cardiomopathy, CHF, neuropathy here for regular f.w    7/20-admitted to [facility] with  left great toe  infection, s.p first ray amputation and was treated with IV abx   Improved and sent home - recently had issues with wound infection and continued treatment for active infection at previous surgery site noted  Now ongoing wound care by Dr. Aniceto Manley and Mateo   Now on IV abx again - IV daptomycin and merrem , ongoing HBO tx daily         DM- 2 IDDM with complications- neuropathy and foot ulcers  S/p right great toe amputation and left great toe and 2nd toe amputations      He has been very careful about sugars and monitoring it 3 times daily and was compliant with insulin for few months  Since last time, pt has been doing ok, foot wound healed well and ran out of long acting insulin and reports running in 200's. Low around 80 when working too much     Reports neuropathy symptoms getting worse       Has h.o non ischemic cardiomyopathy . EF noted low at 20-25 % (2014)and treated with diuresis , b blockers, digoxin  , improved EF on f.w ECHo  Able to work  Normally with no residual dyspnea or pedal edema. No chest pain or nocturnal cough-   Currently on coreg and losartan and lasix  only. Off digoxin. Aldactone         Has severe Neuropathy in extremities with symptoms of tingling and numbness in hands . Palms are hypersensitive. Now on neurontin tid, off tramadol and pain meds. Unable to afford lyrica. Anxiety improved on paxil     HTN - recently office readings remain stable with meds - on losartan, coreg,   Being compliant    Stopped statins with side effects    Current Outpatient Medications   Medication Sig Dispense Refill    blood glucose test strips (ONETOUCH ULTRA) strip USE TO TEST BLOOD GLUCOSE DAILY.  DX:E11.9 100 strip 0    traZODone (DESYREL) 50 MG tablet TAKE 1 TABLET BY MOUTH NIGHTLY 90 tablet 0    carvedilol (COREG) 12.5 MG tablet TAKE 1 TABLET BY MOUTH 2 TIMES DAILY (WITH MEALS) 180 tablet 0    tiZANidine (ZANAFLEX) 4 MG tablet TAKE 2 TABLETS BY MOUTH NIGHTLY 180 tablet 0    HUMALOG KWIKPEN 100 UNIT/ML SOPN INJECT 25 UNITS INTO THE SKIN 3 TIMES DAILY (BEFORE MEALS) 15 mL 2    Blood Glucose Monitoring Suppl (CONTOUR NEXT EZ) w/Device KIT Use to test glucose daily. DX:E11.9 1 kit 0    glimepiride (AMARYL) 4 MG tablet TAKE 1 TABLET BY MOUTH EVERY MORNING (BEFORE BREAKFAST) 90 tablet 0    Insulin Pen Needle 32G X 6 MM MISC Use with insulin daily . DX:E11.9 100 each 3    blood glucose monitor strips Use to test blood sugar daily. DX:E11.9 100 strip 3    PARoxetine (PAXIL) 10 MG tablet TAKE 1 TABLET BY MOUTH EVERY MORNING 90 tablet 0    furosemide (LASIX) 20 MG tablet Take 1 tablet by mouth daily 90 tablet 1    Insulin Glargine, 1 Unit Dial, (TOUJEO SOLOSTAR) 300 UNIT/ML SOPN Inject 75 Units into the skin nightly 24 pen 1    losartan (COZAAR) 50 MG tablet Take 1 tablet by mouth daily 90 tablet 0    gabapentin (NEURONTIN) 400 MG capsule Take 2 capsules by mouth 3 times daily for 90 days. TAKE 2 CAPSULES BY MOUTH THREE TIMES DAILY. 540 capsule 0    oxymetazoline (AFRIN) 0.05 % nasal spray 2 sprays by Nasal route daily Indications: prior to HBO      Aspirin-Acetaminophen-Caffeine (EXCEDRIN MIGRAINE PO) Take 2 capsules by mouth as needed       loratadine (CLARITIN) 10 MG tablet Take 10 mg by mouth nightly as needed       simvastatin (ZOCOR) 20 MG tablet TAKE 1 TABLET BY MOUTH NIGHTLY 30 tablet 3    ammonium lactate (LAC-HYDRIN) 12 % cream Apply topically 2x daily.  1 Tube 6    sildenafil (VIAGRA) 100 MG tablet TAKE 1 TABLET BY MOUTH AS NEEDED FOR ERECTILE DYSFUNCTION 15 tablet 0    insulin lispro (HUMALOG KWIKPEN) 100 UNIT/ML pen Inject 15 Units into the skin 3 times daily (before meals) (Patient taking differently: Inject into the skin 3 times daily (before meals) Patient takes per gemfibrozil for side effects   Need eye exam this year      HTN - recently improved with being compliant  -coreg, losartan     Hyperlipidemia - severely elevated TGL.   on statins- but stopped with side effects   Change to crestor and see       Cardiomyopathy - with low EF 2014, thought to be viral - resolved 2015 with improved EF   Continue coreg, lasix , ARB    Peripheral neuropathy - from uncontrolled DM  - continue neurontin - high dose - 800 mg TID   - off pain mx    Depression - stable on paxil    ED - prn viagra       Refused flu vaccines   should consider pneumovax            So Herrera MD

## 2021-03-12 LAB
ESTIMATED AVERAGE GLUCOSE: 165.7 MG/DL
HBA1C MFR BLD: 7.4 %

## 2021-03-12 RX ORDER — GABAPENTIN 400 MG/1
CAPSULE ORAL
Qty: 540 CAPSULE | Refills: 0 | Status: SHIPPED | OUTPATIENT
Start: 2021-03-13 | End: 2021-06-07

## 2021-03-16 DIAGNOSIS — I50.22 CHRONIC SYSTOLIC CONGESTIVE HEART FAILURE (HCC): ICD-10-CM

## 2021-03-16 RX ORDER — FUROSEMIDE 20 MG/1
20 TABLET ORAL DAILY
Qty: 90 TABLET | Refills: 0 | Status: SHIPPED | OUTPATIENT
Start: 2021-03-16 | End: 2021-01-01

## 2021-03-22 ENCOUNTER — IMMUNIZATION (OUTPATIENT)
Dept: PRIMARY CARE CLINIC | Age: 46
End: 2021-03-22
Payer: COMMERCIAL

## 2021-03-22 PROCEDURE — 0001A COVID-19, PFIZER VACCINE 30MCG/0.3ML DOSE: CPT | Performed by: FAMILY MEDICINE

## 2021-03-22 PROCEDURE — 91300 COVID-19, PFIZER VACCINE 30MCG/0.3ML DOSE: CPT | Performed by: FAMILY MEDICINE

## 2021-04-06 RX ORDER — LOSARTAN POTASSIUM 50 MG/1
50 TABLET ORAL DAILY
Qty: 30 TABLET | Refills: 0 | Status: SHIPPED | OUTPATIENT
Start: 2021-04-06 | End: 2021-05-03

## 2021-04-12 ENCOUNTER — IMMUNIZATION (OUTPATIENT)
Dept: PRIMARY CARE CLINIC | Age: 46
End: 2021-04-12
Payer: COMMERCIAL

## 2021-04-12 PROCEDURE — 0002A COVID-19, PFIZER VACCINE 30MCG/0.3ML DOSE: CPT | Performed by: FAMILY MEDICINE

## 2021-04-12 PROCEDURE — 91300 COVID-19, PFIZER VACCINE 30MCG/0.3ML DOSE: CPT | Performed by: FAMILY MEDICINE

## 2021-05-03 RX ORDER — TIZANIDINE 4 MG/1
8 TABLET ORAL NIGHTLY
Qty: 180 TABLET | Refills: 0 | Status: SHIPPED | OUTPATIENT
Start: 2021-05-03 | End: 2021-08-02

## 2021-05-03 RX ORDER — LOSARTAN POTASSIUM 50 MG/1
50 TABLET ORAL DAILY
Qty: 30 TABLET | Refills: 0 | Status: SHIPPED | OUTPATIENT
Start: 2021-05-03 | End: 2021-06-04

## 2021-06-04 RX ORDER — LOSARTAN POTASSIUM 50 MG/1
50 TABLET ORAL DAILY
Qty: 90 TABLET | Refills: 0 | Status: SHIPPED | OUTPATIENT
Start: 2021-06-04 | End: 2021-01-01

## 2021-06-04 RX ORDER — ROSUVASTATIN CALCIUM 20 MG/1
20 TABLET, COATED ORAL NIGHTLY
Qty: 90 TABLET | Refills: 0 | Status: SHIPPED | OUTPATIENT
Start: 2021-06-04 | End: 2021-01-01

## 2021-06-04 RX ORDER — TRAZODONE HYDROCHLORIDE 50 MG/1
50 TABLET ORAL NIGHTLY
Qty: 90 TABLET | Refills: 0 | Status: SHIPPED | OUTPATIENT
Start: 2021-06-04 | End: 2021-01-01

## 2021-06-07 DIAGNOSIS — E11.42 DIABETIC PERIPHERAL NEUROPATHY ASSOCIATED WITH TYPE 2 DIABETES MELLITUS (HCC): ICD-10-CM

## 2021-06-07 RX ORDER — GABAPENTIN 400 MG/1
CAPSULE ORAL
Qty: 540 CAPSULE | Refills: 0 | Status: SHIPPED | OUTPATIENT
Start: 2021-06-10 | End: 2021-01-01

## 2021-06-11 DIAGNOSIS — Z79.4 TYPE 2 DIABETES MELLITUS WITH DIABETIC POLYNEUROPATHY, WITH LONG-TERM CURRENT USE OF INSULIN (HCC): ICD-10-CM

## 2021-06-11 DIAGNOSIS — E11.42 TYPE 2 DIABETES MELLITUS WITH DIABETIC POLYNEUROPATHY, WITH LONG-TERM CURRENT USE OF INSULIN (HCC): ICD-10-CM

## 2021-06-14 DIAGNOSIS — F41.9 ANXIETY: ICD-10-CM

## 2021-06-14 RX ORDER — PAROXETINE 10 MG/1
10 TABLET, FILM COATED ORAL EVERY MORNING
Qty: 90 TABLET | Refills: 0 | Status: SHIPPED | OUTPATIENT
Start: 2021-06-14 | End: 2021-01-01

## 2021-06-14 RX ORDER — GLIMEPIRIDE 4 MG/1
4 TABLET ORAL
Qty: 90 TABLET | Refills: 0 | Status: SHIPPED | OUTPATIENT
Start: 2021-06-14 | End: 2021-01-01

## 2021-08-02 RX ORDER — TIZANIDINE 4 MG/1
8 TABLET ORAL NIGHTLY
Qty: 180 TABLET | Refills: 0 | Status: SHIPPED | OUTPATIENT
Start: 2021-08-02 | End: 2021-01-01 | Stop reason: SDUPTHER

## 2021-08-17 RX ORDER — CARVEDILOL 12.5 MG/1
12.5 TABLET ORAL 2 TIMES DAILY WITH MEALS
Qty: 180 TABLET | Refills: 0 | Status: SHIPPED | OUTPATIENT
Start: 2021-08-17 | End: 2021-01-01

## 2021-10-14 NOTE — PROGRESS NOTES
Subjective:      Patient ID: Jeri Francois is a 42 y.o. male. HPI      42 y.o. . Male with known h.o DM-2, HTN, cardiomopathy, CHF, neuropathy here for regular f.w      DM- 2 IDDM with complications- neuropathy and foot ulcers  S/p right great toe amputation and left great toe and 2nd toe amputations    Since last time, pt has been doing ok, foot wound healed well and ran out of long acting insulin and reports running in 200 and later to 400. - when he forgot to take insulin when traveled to [place]  Now back on insulin and improving  Reports neuropathy symptoms getting worse       Has h.o non ischemic cardiomyopathy . EF noted low at 20-25 % (2014)and treated with diuresis , b blockers, digoxin  , improved EF on f.w ECHo  Able to work  Normally with no residual dyspnea or pedal edema. No chest pain or nocturnal cough-   Currently on coreg and losartan and lasix -  Off digoxin. Aldactone         Has severe Neuropathy in extremities with symptoms of tingling and numbness in hands . Palms are hypersensitive. Now on neurontin tid, off tramadol and pain meds. Unable to afford lyrica. 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 90 tablet 0    PARoxetine (PAXIL) 10 MG tablet TAKE 1 TABLET BY MOUTH EVERY MORNING 90 tablet 0    losartan (COZAAR) 50 MG tablet TAKE 1 TABLET BY MOUTH DAILY 30 tablet 0    gabapentin (NEURONTIN) 400 MG capsule TAKE 2 CAPSULES BY MOUTH 3 TIMES DAILY FOR 90 DAYS.  540 capsule 0    carvedilol (COREG) 12.5 MG tablet TAKE 1 TABLET BY MOUTH 2 TIMES DAILY (WITH MEALS) 180 tablet 0    tiZANidine (ZANAFLEX) 4 MG tablet TAKE 2 TABLETS BY MOUTH NIGHTLY 180 tablet 0    rosuvastatin (CRESTOR) 20 MG tablet TAKE 1 TABLET BY MOUTH NIGHTLY 90 tablet 0    traZODone (DESYREL) 50 MG tablet TAKE 1 TABLET BY MOUTH NIGHTLY 90 tablet 0    furosemide (LASIX) 20 MG tablet TAKE 1 TABLET BY MOUTH DAILY 90 tablet 0    blood glucose test strips (ONETOUCH ULTRA) strip USE TO TEST BLOOD GLUCOSE DAILY. DX:E11.9 100 strip 0    HUMALOG KWIKPEN 100 UNIT/ML SOPN INJECT 25 UNITS INTO THE SKIN 3 TIMES DAILY (BEFORE MEALS) 15 mL 2    Blood Glucose Monitoring Suppl (CONTOUR NEXT EZ) w/Device KIT Use to test glucose daily. DX:E11.9 1 kit 0    Insulin Pen Needle 32G X 6 MM MISC Use with insulin daily . DX:E11.9 100 each 3    blood glucose monitor strips Use to test blood sugar daily. DX:E11.9 100 strip 3    Insulin Glargine, 1 Unit Dial, (TOUJEO SOLOSTAR) 300 UNIT/ML SOPN Inject 75 Units into the skin nightly 24 pen 1    oxymetazoline (AFRIN) 0.05 % nasal spray 2 sprays by Nasal route daily Indications: prior to HBO      loratadine (CLARITIN) 10 MG tablet Take 10 mg by mouth nightly as needed       ammonium lactate (LAC-HYDRIN) 12 % cream Apply topically 2x daily. 1 Tube 6    sildenafil (VIAGRA) 100 MG tablet TAKE 1 TABLET BY MOUTH AS NEEDED FOR ERECTILE DYSFUNCTION 15 tablet 0    insulin lispro (HUMALOG KWIKPEN) 100 UNIT/ML pen Inject 15 Units into the skin 3 times daily (before meals) (Patient taking differently: Inject into the skin 3 times daily (before meals) Patient takes per sliding scale plus he adds 15 units to it) 5 pen 0     No current facility-administered medications for this visit. Review of Systems    Positive for wound, no fevers  No weight changes  No fevers  + neuropathy and chronic  pain in feet and fingers     Objective:   Physical Exam     There were no vitals filed for this visit. General: young male,  Awake, alert and oriented.  Appears to be not in any distress  Mucous Membranes:  Pink , anicteric  Neck: No JVD, no carotid bruit, no thyromegaly  Chest:  Clear to auscultation bilaterally, no added sounds  Cardiovascular:  RRR S1S2 heard, no murmurs or gallops  Abdomen:  Soft, undistended, non tender, no organomegaly, BS present  Extremities: No edema or cyanosis. Distal pulses well felt  Neurological : grossly normal    Foot exam not done        Lab Results   Component Value Date    LABA1C 7.4 03/11/2021     Lab Results   Component Value Date    .7 03/11/2021     Wt Readings from Last 3 Encounters:   10/14/21 282 lb (127.9 kg)   03/11/21 276 lb (125.2 kg)   01/25/21 286 lb (129.7 kg)         Assessment:       Diagnosis Orders   1. Type 2 diabetes mellitus with diabetic polyneuropathy, with long-term current use of insulin ([facility]     2. Diabetic peripheral neuropathy associated with type 2 diabetes mellitus ([facility])  HEMOGLOBIN A1C    COMPREHENSIVE METABOLIC PANEL    Lipid, Fasting    MICROALBUMIN / CREATININE URINE RATIO   3. Chronic systolic congestive heart failure ([facility]     4. Mixed hyperlipidemia     5. Essential hypertension          Plan:          DM- 2 chronically uncontrolled with symptoms and complications  - peripheral neuropathy, ED , foot ulcers  - s/p  right and left great toe amputations and now has active left foot wound     - continue aggressive glucose control - last A1c improved from 11 to7.4    - improved  Compliance with diet and insulin earlier this year but now again worsening compliance   -  Toujeo 75 at night, humalog 25 units tid   - diabetic diet   - continue losartan and statins, off gemfibrozil for side effects   Need eye exam this year      HTN - recently improved with being compliant  -coreg, losartan     Hyperlipidemia - severely elevated TGL.   on statins- but stopped with side effects   Changed to crestor and tolerating   Need lipids     Cardiomyopathy - with low EF 2014, thought to be viral - resolved 2015   with improved EF   Continue coreg, lasix , ARB    Peripheral neuropathy - from uncontrolled DM  - continue neurontin - high dose - 800 mg TID   - off pain mx    Depression - stable on paxil    ED - prn viagra       Refused flu vaccines  Had covid vaccines and recommend pna vaccine            Adriana Leader, MD

## 2022-01-01 ENCOUNTER — APPOINTMENT (OUTPATIENT)
Dept: GENERAL RADIOLOGY | Age: 47
DRG: 853 | End: 2022-01-01
Payer: COMMERCIAL

## 2022-01-01 ENCOUNTER — APPOINTMENT (OUTPATIENT)
Dept: CT IMAGING | Age: 47
DRG: 853 | End: 2022-01-01
Payer: COMMERCIAL

## 2022-01-01 ENCOUNTER — APPOINTMENT (OUTPATIENT)
Dept: INTERVENTIONAL RADIOLOGY/VASCULAR | Age: 47
DRG: 853 | End: 2022-01-01
Payer: COMMERCIAL

## 2022-01-01 ENCOUNTER — ANESTHESIA (OUTPATIENT)
Dept: OPERATING ROOM | Age: 47
DRG: 853 | End: 2022-01-01
Payer: COMMERCIAL

## 2022-01-01 ENCOUNTER — ANESTHESIA EVENT (OUTPATIENT)
Dept: OPERATING ROOM | Age: 47
DRG: 853 | End: 2022-01-01
Payer: COMMERCIAL

## 2022-01-01 ENCOUNTER — HOSPITAL ENCOUNTER (INPATIENT)
Age: 47
LOS: 47 days | Discharge: NURSING, REHAB OR LONG-TERM CARE | DRG: 853 | End: 2022-03-10
Attending: EMERGENCY MEDICINE | Admitting: INTERNAL MEDICINE
Payer: COMMERCIAL

## 2022-01-01 ENCOUNTER — APPOINTMENT (OUTPATIENT)
Dept: MRI IMAGING | Age: 47
DRG: 853 | End: 2022-01-01
Payer: COMMERCIAL

## 2022-01-01 ENCOUNTER — APPOINTMENT (OUTPATIENT)
Dept: CT IMAGING | Age: 47
DRG: 091 | End: 2022-01-01
Attending: STUDENT IN AN ORGANIZED HEALTH CARE EDUCATION/TRAINING PROGRAM
Payer: COMMERCIAL

## 2022-01-01 ENCOUNTER — ANESTHESIA EVENT (OUTPATIENT)
Dept: ENDOSCOPY | Age: 47
DRG: 853 | End: 2022-01-01
Payer: COMMERCIAL

## 2022-01-01 ENCOUNTER — HOSPITAL ENCOUNTER (EMERGENCY)
Age: 47
End: 2022-08-29
Attending: EMERGENCY MEDICINE
Payer: COMMERCIAL

## 2022-01-01 ENCOUNTER — APPOINTMENT (OUTPATIENT)
Dept: GENERAL RADIOLOGY | Age: 47
DRG: 091 | End: 2022-01-01
Attending: STUDENT IN AN ORGANIZED HEALTH CARE EDUCATION/TRAINING PROGRAM
Payer: COMMERCIAL

## 2022-01-01 ENCOUNTER — TELEPHONE (OUTPATIENT)
Dept: INTERNAL MEDICINE CLINIC | Age: 47
End: 2022-01-01

## 2022-01-01 ENCOUNTER — APPOINTMENT (OUTPATIENT)
Dept: ULTRASOUND IMAGING | Age: 47
DRG: 091 | End: 2022-01-01
Attending: STUDENT IN AN ORGANIZED HEALTH CARE EDUCATION/TRAINING PROGRAM
Payer: COMMERCIAL

## 2022-01-01 ENCOUNTER — ANESTHESIA (OUTPATIENT)
Dept: ENDOSCOPY | Age: 47
DRG: 853 | End: 2022-01-01
Payer: COMMERCIAL

## 2022-01-01 ENCOUNTER — ANESTHESIA (OUTPATIENT)
Dept: ENDOSCOPY | Age: 47
DRG: 091 | End: 2022-01-01
Payer: COMMERCIAL

## 2022-01-01 ENCOUNTER — ANESTHESIA EVENT (OUTPATIENT)
Dept: ENDOSCOPY | Age: 47
DRG: 091 | End: 2022-01-01
Payer: COMMERCIAL

## 2022-01-01 ENCOUNTER — HOSPITAL ENCOUNTER (INPATIENT)
Age: 47
LOS: 34 days | Discharge: NURSING, REHAB OR LONG-TERM CARE | DRG: 091 | End: 2022-05-17
Attending: STUDENT IN AN ORGANIZED HEALTH CARE EDUCATION/TRAINING PROGRAM | Admitting: STUDENT IN AN ORGANIZED HEALTH CARE EDUCATION/TRAINING PROGRAM
Payer: COMMERCIAL

## 2022-01-01 VITALS
OXYGEN SATURATION: 98 % | TEMPERATURE: 98.1 F | DIASTOLIC BLOOD PRESSURE: 73 MMHG | SYSTOLIC BLOOD PRESSURE: 132 MMHG | WEIGHT: 208.9 LBS | HEART RATE: 94 BPM | BODY MASS INDEX: 27.69 KG/M2 | RESPIRATION RATE: 20 BRPM | HEIGHT: 73 IN

## 2022-01-01 VITALS — DIASTOLIC BLOOD PRESSURE: 70 MMHG | OXYGEN SATURATION: 98 % | SYSTOLIC BLOOD PRESSURE: 123 MMHG

## 2022-01-01 VITALS
HEIGHT: 73 IN | RESPIRATION RATE: 20 BRPM | BODY MASS INDEX: 33.81 KG/M2 | OXYGEN SATURATION: 97 % | TEMPERATURE: 98.5 F | SYSTOLIC BLOOD PRESSURE: 175 MMHG | WEIGHT: 255.1 LBS | DIASTOLIC BLOOD PRESSURE: 88 MMHG | HEART RATE: 106 BPM

## 2022-01-01 VITALS
OXYGEN SATURATION: 100 % | RESPIRATION RATE: 22 BRPM | SYSTOLIC BLOOD PRESSURE: 116 MMHG | DIASTOLIC BLOOD PRESSURE: 62 MMHG

## 2022-01-01 VITALS
DIASTOLIC BLOOD PRESSURE: 56 MMHG | SYSTOLIC BLOOD PRESSURE: 98 MMHG | RESPIRATION RATE: 17 BRPM | OXYGEN SATURATION: 98 %

## 2022-01-01 VITALS — SYSTOLIC BLOOD PRESSURE: 84 MMHG | DIASTOLIC BLOOD PRESSURE: 51 MMHG | OXYGEN SATURATION: 100 %

## 2022-01-01 VITALS — DIASTOLIC BLOOD PRESSURE: 95 MMHG | SYSTOLIC BLOOD PRESSURE: 148 MMHG | OXYGEN SATURATION: 100 %

## 2022-01-01 VITALS — OXYGEN SATURATION: 90 %

## 2022-01-01 DIAGNOSIS — R73.9 HYPERGLYCEMIA: ICD-10-CM

## 2022-01-01 DIAGNOSIS — R55 SYNCOPE, UNSPECIFIED SYNCOPE TYPE: ICD-10-CM

## 2022-01-01 DIAGNOSIS — N17.9 ACUTE RENAL FAILURE, UNSPECIFIED ACUTE RENAL FAILURE TYPE (HCC): Primary | ICD-10-CM

## 2022-01-01 DIAGNOSIS — F41.9 ANXIETY: ICD-10-CM

## 2022-01-01 DIAGNOSIS — G89.29 OTHER CHRONIC PAIN: Primary | ICD-10-CM

## 2022-01-01 DIAGNOSIS — I46.9 CARDIAC ARREST (HCC): Primary | ICD-10-CM

## 2022-01-01 LAB
A/G RATIO: 0.8 (ref 1.1–2.2)
A/G RATIO: 1.4 (ref 1.1–2.2)
ABO/RH: NORMAL
ALBUMIN SERPL-MCNC: 2 G/DL (ref 3.4–5)
ALBUMIN SERPL-MCNC: 2.1 G/DL (ref 3.4–5)
ALBUMIN SERPL-MCNC: 2.2 G/DL (ref 3.4–5)
ALBUMIN SERPL-MCNC: 2.3 G/DL (ref 3.4–5)
ALBUMIN SERPL-MCNC: 2.4 G/DL (ref 3.4–5)
ALBUMIN SERPL-MCNC: 2.5 G/DL (ref 3.4–5)
ALBUMIN SERPL-MCNC: 2.6 G/DL (ref 3.4–5)
ALBUMIN SERPL-MCNC: 2.7 G/DL (ref 3.4–5)
ALBUMIN SERPL-MCNC: 2.8 G/DL (ref 3.4–5)
ALBUMIN SERPL-MCNC: 2.9 G/DL (ref 3.4–5)
ALBUMIN SERPL-MCNC: 3 G/DL (ref 3.4–5)
ALBUMIN SERPL-MCNC: 3.1 G/DL (ref 3.4–5)
ALBUMIN SERPL-MCNC: 3.2 G/DL (ref 3.4–5)
ALBUMIN SERPL-MCNC: 3.6 G/DL (ref 3.4–5)
ALBUMIN SERPL-MCNC: 3.9 G/DL (ref 3.4–5)
ALP BLD-CCNC: 110 U/L (ref 40–129)
ALP BLD-CCNC: 123 U/L (ref 40–129)
ALP BLD-CCNC: 210 U/L (ref 40–129)
ALP BLD-CCNC: 217 U/L (ref 40–129)
ALP BLD-CCNC: 248 U/L (ref 40–129)
ALP BLD-CCNC: 254 U/L (ref 40–129)
ALP BLD-CCNC: 296 U/L (ref 40–129)
ALP BLD-CCNC: 297 U/L (ref 40–129)
ALP BLD-CCNC: 299 U/L (ref 40–129)
ALP BLD-CCNC: 311 U/L (ref 40–129)
ALP BLD-CCNC: 320 U/L (ref 40–129)
ALP BLD-CCNC: 327 U/L (ref 40–129)
ALP BLD-CCNC: 330 U/L (ref 40–129)
ALP BLD-CCNC: 351 U/L (ref 40–129)
ALP BLD-CCNC: 355 U/L (ref 40–129)
ALP BLD-CCNC: 359 U/L (ref 40–129)
ALP BLD-CCNC: 423 U/L (ref 40–129)
ALP BLD-CCNC: 437 U/L (ref 40–129)
ALP BLD-CCNC: 467 U/L (ref 40–129)
ALP BLD-CCNC: 491 U/L (ref 40–129)
ALP BLD-CCNC: 518 U/L (ref 40–129)
ALP BLD-CCNC: 576 U/L (ref 40–129)
ALP BLD-CCNC: 624 U/L (ref 40–129)
ALT SERPL-CCNC: 11 U/L (ref 10–40)
ALT SERPL-CCNC: 119 U/L (ref 10–40)
ALT SERPL-CCNC: 13 U/L (ref 10–40)
ALT SERPL-CCNC: 134 U/L (ref 10–40)
ALT SERPL-CCNC: 14 U/L (ref 10–40)
ALT SERPL-CCNC: 149 U/L (ref 10–40)
ALT SERPL-CCNC: 161 U/L (ref 10–40)
ALT SERPL-CCNC: 17 U/L (ref 10–40)
ALT SERPL-CCNC: 187 U/L (ref 10–40)
ALT SERPL-CCNC: 197 U/L (ref 10–40)
ALT SERPL-CCNC: 23 U/L (ref 10–40)
ALT SERPL-CCNC: 52 U/L (ref 10–40)
ALT SERPL-CCNC: 6 U/L (ref 10–40)
ALT SERPL-CCNC: 6 U/L (ref 10–40)
ALT SERPL-CCNC: 7 U/L (ref 10–40)
ALT SERPL-CCNC: 7 U/L (ref 10–40)
ALT SERPL-CCNC: 9 U/L (ref 10–40)
ALT SERPL-CCNC: 9 U/L (ref 10–40)
ALT SERPL-CCNC: 92 U/L (ref 10–40)
ALT SERPL-CCNC: <5 U/L (ref 10–40)
AMMONIA: 15 UMOL/L (ref 16–60)
AMYLASE: 76 U/L (ref 25–115)
ANAEROBIC CULTURE: ABNORMAL
ANAEROBIC CULTURE: ABNORMAL
ANION GAP SERPL CALCULATED.3IONS-SCNC: 10 MMOL/L (ref 3–16)
ANION GAP SERPL CALCULATED.3IONS-SCNC: 11 MMOL/L (ref 3–16)
ANION GAP SERPL CALCULATED.3IONS-SCNC: 12 MMOL/L (ref 3–16)
ANION GAP SERPL CALCULATED.3IONS-SCNC: 13 MMOL/L (ref 3–16)
ANION GAP SERPL CALCULATED.3IONS-SCNC: 14 MMOL/L (ref 3–16)
ANION GAP SERPL CALCULATED.3IONS-SCNC: 15 MMOL/L (ref 3–16)
ANION GAP SERPL CALCULATED.3IONS-SCNC: 15 MMOL/L (ref 3–16)
ANION GAP SERPL CALCULATED.3IONS-SCNC: 16 MMOL/L (ref 3–16)
ANION GAP SERPL CALCULATED.3IONS-SCNC: 17 MMOL/L (ref 3–16)
ANION GAP SERPL CALCULATED.3IONS-SCNC: 18 MMOL/L (ref 3–16)
ANION GAP SERPL CALCULATED.3IONS-SCNC: 19 MMOL/L (ref 3–16)
ANION GAP SERPL CALCULATED.3IONS-SCNC: 20 MMOL/L (ref 3–16)
ANION GAP SERPL CALCULATED.3IONS-SCNC: 21 MMOL/L (ref 3–16)
ANION GAP SERPL CALCULATED.3IONS-SCNC: 23 MMOL/L (ref 3–16)
ANION GAP SERPL CALCULATED.3IONS-SCNC: 23 MMOL/L (ref 3–16)
ANION GAP SERPL CALCULATED.3IONS-SCNC: 3 MMOL/L (ref 3–16)
ANION GAP SERPL CALCULATED.3IONS-SCNC: 9 MMOL/L (ref 3–16)
ANION GAP SERPL CALCULATED.3IONS-SCNC: 9 MMOL/L (ref 3–16)
ANISOCYTOSIS: ABNORMAL
ANTI-XA UNFRAC HEPARIN: 0.23 IU/ML (ref 0.3–0.7)
ANTI-XA UNFRAC HEPARIN: 0.28 IU/ML (ref 0.3–0.7)
ANTI-XA UNFRAC HEPARIN: 0.29 IU/ML (ref 0.3–0.7)
ANTI-XA UNFRAC HEPARIN: 0.3 IU/ML (ref 0.3–0.7)
ANTI-XA UNFRAC HEPARIN: 0.32 IU/ML (ref 0.3–0.7)
ANTI-XA UNFRAC HEPARIN: 0.35 IU/ML (ref 0.3–0.7)
ANTI-XA UNFRAC HEPARIN: 0.36 IU/ML (ref 0.3–0.7)
ANTI-XA UNFRAC HEPARIN: 0.38 IU/ML (ref 0.3–0.7)
ANTI-XA UNFRAC HEPARIN: 0.4 IU/ML (ref 0.3–0.7)
ANTI-XA UNFRAC HEPARIN: 0.47 IU/ML (ref 0.3–0.7)
ANTIBODY IDENTIFICATION: NORMAL
ANTIBODY IDENTIFICATION: NORMAL
ANTIBODY SCREEN: NORMAL
APPEARANCE BAL (LAVAGE): ABNORMAL
APPEARANCE BAL (LAVAGE): ABNORMAL
APTT: 35.7 SEC (ref 26.2–38.6)
APTT: 42.3 SEC (ref 26.2–38.6)
APTT: 67.1 SEC (ref 26.2–38.6)
AST SERPL-CCNC: 105 U/L (ref 15–37)
AST SERPL-CCNC: 11 U/L (ref 15–37)
AST SERPL-CCNC: 11 U/L (ref 15–37)
AST SERPL-CCNC: 120 U/L (ref 15–37)
AST SERPL-CCNC: 13 U/L (ref 15–37)
AST SERPL-CCNC: 14 U/L (ref 15–37)
AST SERPL-CCNC: 151 U/L (ref 15–37)
AST SERPL-CCNC: 17 U/L (ref 15–37)
AST SERPL-CCNC: 184 U/L (ref 15–37)
AST SERPL-CCNC: 206 U/L (ref 15–37)
AST SERPL-CCNC: 22 U/L (ref 15–37)
AST SERPL-CCNC: 223 U/L (ref 15–37)
AST SERPL-CCNC: 235 U/L (ref 15–37)
AST SERPL-CCNC: 26 U/L (ref 15–37)
AST SERPL-CCNC: 269 U/L (ref 15–37)
AST SERPL-CCNC: 401 U/L (ref 15–37)
AST SERPL-CCNC: 405 U/L (ref 15–37)
AST SERPL-CCNC: 56 U/L (ref 15–37)
AST SERPL-CCNC: 63 U/L (ref 15–37)
AST SERPL-CCNC: 64 U/L (ref 15–37)
AST SERPL-CCNC: 7 U/L (ref 15–37)
AST SERPL-CCNC: 8 U/L (ref 15–37)
AST SERPL-CCNC: 8 U/L (ref 15–37)
AST SERPL-CCNC: 83 U/L (ref 15–37)
AST SERPL-CCNC: 85 U/L (ref 15–37)
ATYPICAL LYMPHOCYTE RELATIVE PERCENT: 1 % (ref 0–6)
ATYPICAL LYMPHOCYTE RELATIVE PERCENT: 3 % (ref 0–6)
ATYPICAL LYMPHOCYTE RELATIVE PERCENT: 5 % (ref 0–6)
BACTERIA: ABNORMAL /HPF
BANDED NEUTROPHILS RELATIVE PERCENT: 1 % (ref 0–7)
BANDED NEUTROPHILS RELATIVE PERCENT: 2 % (ref 0–7)
BANDED NEUTROPHILS RELATIVE PERCENT: 2 % (ref 0–7)
BANDED NEUTROPHILS RELATIVE PERCENT: 3 % (ref 0–7)
BANDED NEUTROPHILS RELATIVE PERCENT: 5 % (ref 0–7)
BANDED NEUTROPHILS RELATIVE PERCENT: 5 % (ref 0–7)
BANDED NEUTROPHILS RELATIVE PERCENT: 6 % (ref 0–7)
BANDED NEUTROPHILS RELATIVE PERCENT: 6 % (ref 0–7)
BASE EXCESS ARTERIAL: -0.4 MMOL/L (ref -3–3)
BASE EXCESS ARTERIAL: -1 MMOL/L (ref -3–3)
BASE EXCESS ARTERIAL: -1.1 MMOL/L (ref -3–3)
BASE EXCESS ARTERIAL: -1.2 MMOL/L (ref -3–3)
BASE EXCESS ARTERIAL: -1.8 MMOL/L (ref -3–3)
BASE EXCESS ARTERIAL: -10.2 MMOL/L (ref -3–3)
BASE EXCESS ARTERIAL: -15.1 MMOL/L (ref -3–3)
BASE EXCESS ARTERIAL: -2.3 MMOL/L (ref -3–3)
BASE EXCESS ARTERIAL: -2.4 MMOL/L (ref -3–3)
BASE EXCESS ARTERIAL: -2.5 MMOL/L (ref -3–3)
BASE EXCESS ARTERIAL: -2.5 MMOL/L (ref -3–3)
BASE EXCESS ARTERIAL: -2.6 MMOL/L (ref -3–3)
BASE EXCESS ARTERIAL: -2.8 MMOL/L (ref -3–3)
BASE EXCESS ARTERIAL: -3 MMOL/L (ref -3–3)
BASE EXCESS ARTERIAL: -3.6 MMOL/L (ref -3–3)
BASE EXCESS ARTERIAL: -3.9 MMOL/L (ref -3–3)
BASE EXCESS ARTERIAL: -4 MMOL/L (ref -3–3)
BASE EXCESS ARTERIAL: -4.4 MMOL/L (ref -3–3)
BASE EXCESS ARTERIAL: -4.5 MMOL/L (ref -3–3)
BASE EXCESS ARTERIAL: -4.8 MMOL/L (ref -3–3)
BASE EXCESS ARTERIAL: -5.1 MMOL/L (ref -3–3)
BASE EXCESS ARTERIAL: -5.3 MMOL/L (ref -3–3)
BASE EXCESS ARTERIAL: -5.6 MMOL/L (ref -3–3)
BASE EXCESS ARTERIAL: -5.7 MMOL/L (ref -3–3)
BASE EXCESS ARTERIAL: -5.8 MMOL/L (ref -3–3)
BASE EXCESS ARTERIAL: -6.2 MMOL/L (ref -3–3)
BASE EXCESS ARTERIAL: -6.3 MMOL/L (ref -3–3)
BASE EXCESS ARTERIAL: -6.4 MMOL/L (ref -3–3)
BASE EXCESS ARTERIAL: -6.7 MMOL/L (ref -3–3)
BASE EXCESS ARTERIAL: -7.6 MMOL/L (ref -3–3)
BASE EXCESS ARTERIAL: 0 MMOL/L (ref -3–3)
BASE EXCESS ARTERIAL: 0.6 MMOL/L (ref -3–3)
BASE EXCESS ARTERIAL: 0.7 MMOL/L (ref -3–3)
BASE EXCESS VENOUS: -13.8 MMOL/L (ref -3–3)
BASE EXCESS VENOUS: -4.2 MMOL/L (ref -3–3)
BASE EXCESS VENOUS: -5.1 MMOL/L (ref -3–3)
BASE EXCESS VENOUS: 0.2 MMOL/L (ref -3–3)
BASOPHILS ABSOLUTE: 0 K/UL (ref 0–0.2)
BASOPHILS ABSOLUTE: 0.1 K/UL (ref 0–0.2)
BASOPHILS ABSOLUTE: 0.2 K/UL (ref 0–0.2)
BASOPHILS ABSOLUTE: 0.2 K/UL (ref 0–0.2)
BASOPHILS ABSOLUTE: 0.3 K/UL (ref 0–0.2)
BASOPHILS ABSOLUTE: 0.4 K/UL (ref 0–0.2)
BASOPHILS ABSOLUTE: 0.5 K/UL (ref 0–0.2)
BASOPHILS RELATIVE PERCENT: 0 %
BASOPHILS RELATIVE PERCENT: 0.2 %
BASOPHILS RELATIVE PERCENT: 0.4 %
BASOPHILS RELATIVE PERCENT: 0.5 %
BASOPHILS RELATIVE PERCENT: 0.6 %
BASOPHILS RELATIVE PERCENT: 0.7 %
BASOPHILS RELATIVE PERCENT: 0.8 %
BASOPHILS RELATIVE PERCENT: 0.9 %
BASOPHILS RELATIVE PERCENT: 0.9 %
BASOPHILS RELATIVE PERCENT: 1 %
BASOPHILS RELATIVE PERCENT: 1 %
BASOPHILS RELATIVE PERCENT: 1.1 %
BASOPHILS RELATIVE PERCENT: 1.2 %
BASOPHILS RELATIVE PERCENT: 1.2 %
BASOPHILS RELATIVE PERCENT: 1.3 %
BASOPHILS RELATIVE PERCENT: 1.4 %
BASOPHILS RELATIVE PERCENT: 1.6 %
BASOPHILS RELATIVE PERCENT: 2 %
BASOPHILS RELATIVE PERCENT: 2 %
BASOPHILS RELATIVE PERCENT: 2.1 %
BILIRUB SERPL-MCNC: 0.3 MG/DL (ref 0–1)
BILIRUB SERPL-MCNC: 0.4 MG/DL (ref 0–1)
BILIRUB SERPL-MCNC: 0.5 MG/DL (ref 0–1)
BILIRUB SERPL-MCNC: 0.6 MG/DL (ref 0–1)
BILIRUB SERPL-MCNC: 0.6 MG/DL (ref 0–1)
BILIRUB SERPL-MCNC: 0.7 MG/DL (ref 0–1)
BILIRUB SERPL-MCNC: 0.7 MG/DL (ref 0–1)
BILIRUB SERPL-MCNC: 0.8 MG/DL (ref 0–1)
BILIRUB SERPL-MCNC: 1 MG/DL (ref 0–1)
BILIRUB SERPL-MCNC: 1.1 MG/DL (ref 0–1)
BILIRUB SERPL-MCNC: 1.2 MG/DL (ref 0–1)
BILIRUB SERPL-MCNC: 1.3 MG/DL (ref 0–1)
BILIRUB SERPL-MCNC: 1.4 MG/DL (ref 0–1)
BILIRUB SERPL-MCNC: 1.5 MG/DL (ref 0–1)
BILIRUB SERPL-MCNC: 1.6 MG/DL (ref 0–1)
BILIRUB SERPL-MCNC: 1.6 MG/DL (ref 0–1)
BILIRUB SERPL-MCNC: 1.7 MG/DL (ref 0–1)
BILIRUB SERPL-MCNC: 1.8 MG/DL (ref 0–1)
BILIRUB SERPL-MCNC: 1.8 MG/DL (ref 0–1)
BILIRUB SERPL-MCNC: 2.6 MG/DL (ref 0–1)
BILIRUBIN DIRECT: 0.3 MG/DL (ref 0–0.3)
BILIRUBIN DIRECT: 0.4 MG/DL (ref 0–0.3)
BILIRUBIN DIRECT: 0.5 MG/DL (ref 0–0.3)
BILIRUBIN DIRECT: 0.6 MG/DL (ref 0–0.3)
BILIRUBIN DIRECT: 0.7 MG/DL (ref 0–0.3)
BILIRUBIN DIRECT: 0.9 MG/DL (ref 0–0.3)
BILIRUBIN DIRECT: 1 MG/DL (ref 0–0.3)
BILIRUBIN DIRECT: 1.1 MG/DL (ref 0–0.3)
BILIRUBIN DIRECT: 1.2 MG/DL (ref 0–0.3)
BILIRUBIN DIRECT: 1.3 MG/DL (ref 0–0.3)
BILIRUBIN DIRECT: 1.3 MG/DL (ref 0–0.3)
BILIRUBIN DIRECT: 2.3 MG/DL (ref 0–0.3)
BILIRUBIN DIRECT: <0.2 MG/DL (ref 0–0.3)
BILIRUBIN URINE: ABNORMAL
BILIRUBIN URINE: ABNORMAL
BILIRUBIN URINE: NEGATIVE
BILIRUBIN, INDIRECT: 0.2 MG/DL (ref 0–1)
BILIRUBIN, INDIRECT: 0.3 MG/DL (ref 0–1)
BILIRUBIN, INDIRECT: 0.4 MG/DL (ref 0–1)
BILIRUBIN, INDIRECT: 0.5 MG/DL (ref 0–1)
BILIRUBIN, INDIRECT: 0.6 MG/DL (ref 0–1)
BILIRUBIN, INDIRECT: ABNORMAL MG/DL (ref 0–1)
BLOOD BANK DISPENSE STATUS: NORMAL
BLOOD BANK PRODUCT CODE: NORMAL
BLOOD BANK REF CASE: NORMAL
BLOOD CULTURE, ROUTINE: ABNORMAL
BLOOD CULTURE, ROUTINE: NORMAL
BLOOD, URINE: ABNORMAL
BPU ID: NORMAL
BUN BLDV-MCNC: 10 MG/DL (ref 7–20)
BUN BLDV-MCNC: 10 MG/DL (ref 7–20)
BUN BLDV-MCNC: 108 MG/DL (ref 7–20)
BUN BLDV-MCNC: 11 MG/DL (ref 7–20)
BUN BLDV-MCNC: 12 MG/DL (ref 7–20)
BUN BLDV-MCNC: 12 MG/DL (ref 7–20)
BUN BLDV-MCNC: 136 MG/DL (ref 7–20)
BUN BLDV-MCNC: 14 MG/DL (ref 7–20)
BUN BLDV-MCNC: 16 MG/DL (ref 7–20)
BUN BLDV-MCNC: 16 MG/DL (ref 7–20)
BUN BLDV-MCNC: 17 MG/DL (ref 7–20)
BUN BLDV-MCNC: 18 MG/DL (ref 7–20)
BUN BLDV-MCNC: 19 MG/DL (ref 7–20)
BUN BLDV-MCNC: 20 MG/DL (ref 7–20)
BUN BLDV-MCNC: 21 MG/DL (ref 7–20)
BUN BLDV-MCNC: 21 MG/DL (ref 7–20)
BUN BLDV-MCNC: 22 MG/DL (ref 7–20)
BUN BLDV-MCNC: 23 MG/DL (ref 7–20)
BUN BLDV-MCNC: 24 MG/DL (ref 7–20)
BUN BLDV-MCNC: 31 MG/DL (ref 7–20)
BUN BLDV-MCNC: 31 MG/DL (ref 7–20)
BUN BLDV-MCNC: 32 MG/DL (ref 7–20)
BUN BLDV-MCNC: 34 MG/DL (ref 7–20)
BUN BLDV-MCNC: 37 MG/DL (ref 7–20)
BUN BLDV-MCNC: 37 MG/DL (ref 7–20)
BUN BLDV-MCNC: 38 MG/DL (ref 7–20)
BUN BLDV-MCNC: 39 MG/DL (ref 7–20)
BUN BLDV-MCNC: 40 MG/DL (ref 7–20)
BUN BLDV-MCNC: 41 MG/DL (ref 7–20)
BUN BLDV-MCNC: 41 MG/DL (ref 7–20)
BUN BLDV-MCNC: 42 MG/DL (ref 7–20)
BUN BLDV-MCNC: 42 MG/DL (ref 7–20)
BUN BLDV-MCNC: 43 MG/DL (ref 7–20)
BUN BLDV-MCNC: 44 MG/DL (ref 7–20)
BUN BLDV-MCNC: 47 MG/DL (ref 7–20)
BUN BLDV-MCNC: 48 MG/DL (ref 7–20)
BUN BLDV-MCNC: 50 MG/DL (ref 7–20)
BUN BLDV-MCNC: 50 MG/DL (ref 7–20)
BUN BLDV-MCNC: 52 MG/DL (ref 7–20)
BUN BLDV-MCNC: 53 MG/DL (ref 7–20)
BUN BLDV-MCNC: 54 MG/DL (ref 7–20)
BUN BLDV-MCNC: 62 MG/DL (ref 7–20)
BUN BLDV-MCNC: 64 MG/DL (ref 7–20)
BUN BLDV-MCNC: 64 MG/DL (ref 7–20)
BUN BLDV-MCNC: 68 MG/DL (ref 7–20)
BUN BLDV-MCNC: 68 MG/DL (ref 7–20)
BUN BLDV-MCNC: 69 MG/DL (ref 7–20)
BUN BLDV-MCNC: 69 MG/DL (ref 7–20)
BUN BLDV-MCNC: 70 MG/DL (ref 7–20)
BUN BLDV-MCNC: 70 MG/DL (ref 7–20)
BUN BLDV-MCNC: 71 MG/DL (ref 7–20)
BUN BLDV-MCNC: 75 MG/DL (ref 7–20)
BUN BLDV-MCNC: 75 MG/DL (ref 7–20)
BUN BLDV-MCNC: 76 MG/DL (ref 7–20)
BUN BLDV-MCNC: 76 MG/DL (ref 7–20)
BUN BLDV-MCNC: 78 MG/DL (ref 7–20)
BUN BLDV-MCNC: 81 MG/DL (ref 7–20)
BUN BLDV-MCNC: 81 MG/DL (ref 7–20)
BUN BLDV-MCNC: 85 MG/DL (ref 7–20)
BUN BLDV-MCNC: 85 MG/DL (ref 7–20)
BUN BLDV-MCNC: 86 MG/DL (ref 7–20)
BUN BLDV-MCNC: 87 MG/DL (ref 7–20)
BUN BLDV-MCNC: 91 MG/DL (ref 7–20)
BUN BLDV-MCNC: 93 MG/DL (ref 7–20)
BUN BLDV-MCNC: 93 MG/DL (ref 7–20)
BUN BLDV-MCNC: 97 MG/DL (ref 7–20)
BUN BLDV-MCNC: 98 MG/DL (ref 7–20)
C DIFF TOXIN/ANTIGEN: NORMAL
C-REACTIVE PROTEIN: 135.5 MG/L (ref 0–5.1)
C-REACTIVE PROTEIN: 53.7 MG/L (ref 0–5.1)
C-REACTIVE PROTEIN: 54.9 MG/L (ref 0–5.1)
C-REACTIVE PROTEIN: 70 MG/L (ref 0–5.1)
C-REACTIVE PROTEIN: 70.6 MG/L (ref 0–5.1)
CALCIUM IONIZED: 0.97 MMOL/L (ref 1.12–1.32)
CALCIUM IONIZED: 0.98 MMOL/L (ref 1.12–1.32)
CALCIUM IONIZED: 0.98 MMOL/L (ref 1.12–1.32)
CALCIUM IONIZED: 0.99 MMOL/L (ref 1.12–1.32)
CALCIUM IONIZED: 1 MMOL/L (ref 1.12–1.32)
CALCIUM IONIZED: 1.01 MMOL/L (ref 1.12–1.32)
CALCIUM IONIZED: 1.01 MMOL/L (ref 1.12–1.32)
CALCIUM IONIZED: 1.02 MMOL/L (ref 1.12–1.32)
CALCIUM IONIZED: 1.03 MMOL/L (ref 1.12–1.32)
CALCIUM IONIZED: 1.03 MMOL/L (ref 1.12–1.32)
CALCIUM IONIZED: 1.04 MMOL/L (ref 1.12–1.32)
CALCIUM IONIZED: 1.05 MMOL/L (ref 1.12–1.32)
CALCIUM IONIZED: 1.06 MMOL/L (ref 1.12–1.32)
CALCIUM IONIZED: 1.07 MMOL/L (ref 1.12–1.32)
CALCIUM IONIZED: 1.1 MMOL/L (ref 1.12–1.32)
CALCIUM SERPL-MCNC: 10 MG/DL (ref 8.3–10.6)
CALCIUM SERPL-MCNC: 10.1 MG/DL (ref 8.3–10.6)
CALCIUM SERPL-MCNC: 10.3 MG/DL (ref 8.3–10.6)
CALCIUM SERPL-MCNC: 10.4 MG/DL (ref 8.3–10.6)
CALCIUM SERPL-MCNC: 10.5 MG/DL (ref 8.3–10.6)
CALCIUM SERPL-MCNC: 10.7 MG/DL (ref 8.3–10.6)
CALCIUM SERPL-MCNC: 10.7 MG/DL (ref 8.3–10.6)
CALCIUM SERPL-MCNC: 10.8 MG/DL (ref 8.3–10.6)
CALCIUM SERPL-MCNC: 11.1 MG/DL (ref 8.3–10.6)
CALCIUM SERPL-MCNC: 11.1 MG/DL (ref 8.3–10.6)
CALCIUM SERPL-MCNC: 36 MG/DL (ref 8.3–10.6)
CALCIUM SERPL-MCNC: 6.7 MG/DL (ref 8.3–10.6)
CALCIUM SERPL-MCNC: 6.9 MG/DL (ref 8.3–10.6)
CALCIUM SERPL-MCNC: 7.2 MG/DL (ref 8.3–10.6)
CALCIUM SERPL-MCNC: 7.3 MG/DL (ref 8.3–10.6)
CALCIUM SERPL-MCNC: 7.4 MG/DL (ref 8.3–10.6)
CALCIUM SERPL-MCNC: 7.4 MG/DL (ref 8.3–10.6)
CALCIUM SERPL-MCNC: 7.5 MG/DL (ref 8.3–10.6)
CALCIUM SERPL-MCNC: 7.5 MG/DL (ref 8.3–10.6)
CALCIUM SERPL-MCNC: 7.6 MG/DL (ref 8.3–10.6)
CALCIUM SERPL-MCNC: 7.7 MG/DL (ref 8.3–10.6)
CALCIUM SERPL-MCNC: 7.8 MG/DL (ref 8.3–10.6)
CALCIUM SERPL-MCNC: 7.9 MG/DL (ref 8.3–10.6)
CALCIUM SERPL-MCNC: 8 MG/DL (ref 8.3–10.6)
CALCIUM SERPL-MCNC: 8.1 MG/DL (ref 8.3–10.6)
CALCIUM SERPL-MCNC: 8.2 MG/DL (ref 8.3–10.6)
CALCIUM SERPL-MCNC: 8.3 MG/DL (ref 8.3–10.6)
CALCIUM SERPL-MCNC: 8.3 MG/DL (ref 8.3–10.6)
CALCIUM SERPL-MCNC: 8.4 MG/DL (ref 8.3–10.6)
CALCIUM SERPL-MCNC: 8.5 MG/DL (ref 8.3–10.6)
CALCIUM SERPL-MCNC: 8.6 MG/DL (ref 8.3–10.6)
CALCIUM SERPL-MCNC: 8.6 MG/DL (ref 8.3–10.6)
CALCIUM SERPL-MCNC: 8.7 MG/DL (ref 8.3–10.6)
CALCIUM SERPL-MCNC: 8.8 MG/DL (ref 8.3–10.6)
CALCIUM SERPL-MCNC: 8.8 MG/DL (ref 8.3–10.6)
CALCIUM SERPL-MCNC: 9 MG/DL (ref 8.3–10.6)
CALCIUM SERPL-MCNC: 9 MG/DL (ref 8.3–10.6)
CALCIUM SERPL-MCNC: 9.2 MG/DL (ref 8.3–10.6)
CALCIUM SERPL-MCNC: 9.5 MG/DL (ref 8.3–10.6)
CALCIUM SERPL-MCNC: 9.6 MG/DL (ref 8.3–10.6)
CALCIUM SERPL-MCNC: 9.9 MG/DL (ref 8.3–10.6)
CARBOXYHEMOGLOBIN ARTERIAL: 0 % (ref 0–1.5)
CARBOXYHEMOGLOBIN ARTERIAL: 0.1 % (ref 0–1.5)
CARBOXYHEMOGLOBIN ARTERIAL: 0.2 % (ref 0–1.5)
CARBOXYHEMOGLOBIN ARTERIAL: 0.3 % (ref 0–1.5)
CARBOXYHEMOGLOBIN ARTERIAL: 0.4 % (ref 0–1.5)
CARBOXYHEMOGLOBIN ARTERIAL: 0.4 % (ref 0–1.5)
CARBOXYHEMOGLOBIN ARTERIAL: 0.5 % (ref 0–1.5)
CARBOXYHEMOGLOBIN ARTERIAL: 0.6 % (ref 0–1.5)
CARBOXYHEMOGLOBIN ARTERIAL: 0.8 % (ref 0–1.5)
CARBOXYHEMOGLOBIN ARTERIAL: 0.8 % (ref 0–1.5)
CARBOXYHEMOGLOBIN ARTERIAL: 0.9 % (ref 0–1.5)
CARBOXYHEMOGLOBIN ARTERIAL: 1.2 % (ref 0–1.5)
CARBOXYHEMOGLOBIN: 0.9 % (ref 0–1.5)
CARBOXYHEMOGLOBIN: 1.7 % (ref 0–1.5)
CARBOXYHEMOGLOBIN: 2 % (ref 0–1.5)
CARBOXYHEMOGLOBIN: 3.3 % (ref 0–1.5)
CHLORIDE BLD-SCNC: 100 MMOL/L (ref 99–110)
CHLORIDE BLD-SCNC: 101 MMOL/L (ref 99–110)
CHLORIDE BLD-SCNC: 102 MMOL/L (ref 99–110)
CHLORIDE BLD-SCNC: 103 MMOL/L (ref 99–110)
CHLORIDE BLD-SCNC: 104 MMOL/L (ref 99–110)
CHLORIDE BLD-SCNC: 105 MMOL/L (ref 99–110)
CHLORIDE BLD-SCNC: 105 MMOL/L (ref 99–110)
CHLORIDE BLD-SCNC: 106 MMOL/L (ref 99–110)
CHLORIDE BLD-SCNC: 114 MMOL/L (ref 99–110)
CHLORIDE BLD-SCNC: 87 MMOL/L (ref 99–110)
CHLORIDE BLD-SCNC: 90 MMOL/L (ref 99–110)
CHLORIDE BLD-SCNC: 93 MMOL/L (ref 99–110)
CHLORIDE BLD-SCNC: 93 MMOL/L (ref 99–110)
CHLORIDE BLD-SCNC: 94 MMOL/L (ref 99–110)
CHLORIDE BLD-SCNC: 95 MMOL/L (ref 99–110)
CHLORIDE BLD-SCNC: 95 MMOL/L (ref 99–110)
CHLORIDE BLD-SCNC: 96 MMOL/L (ref 99–110)
CHLORIDE BLD-SCNC: 97 MMOL/L (ref 99–110)
CHLORIDE BLD-SCNC: 98 MMOL/L (ref 99–110)
CHLORIDE BLD-SCNC: 99 MMOL/L (ref 99–110)
CLARITY: ABNORMAL
CLARITY: CLEAR
CLARITY: CLEAR
CLOT EVALUATION BAL: ABNORMAL
CLOT EVALUATION BAL: ABNORMAL
CO2: 15 MMOL/L (ref 21–32)
CO2: 16 MMOL/L (ref 21–32)
CO2: 16 MMOL/L (ref 21–32)
CO2: 17 MMOL/L (ref 21–32)
CO2: 18 MMOL/L (ref 21–32)
CO2: 19 MMOL/L (ref 21–32)
CO2: 20 MMOL/L (ref 21–32)
CO2: 21 MMOL/L (ref 21–32)
CO2: 22 MMOL/L (ref 21–32)
CO2: 23 MMOL/L (ref 21–32)
CO2: 24 MMOL/L (ref 21–32)
CO2: 25 MMOL/L (ref 21–32)
CO2: 26 MMOL/L (ref 21–32)
CO2: 26 MMOL/L (ref 21–32)
CO2: 27 MMOL/L (ref 21–32)
CO2: 28 MMOL/L (ref 21–32)
CO2: 28 MMOL/L (ref 21–32)
CO2: 29 MMOL/L (ref 21–32)
COLOR LAVAGE: ABNORMAL
COLOR LAVAGE: COLORLESS
COLOR: ABNORMAL
COLOR: ABNORMAL
COLOR: YELLOW
CREAT SERPL-MCNC: 1 MG/DL (ref 0.9–1.3)
CREAT SERPL-MCNC: 1.1 MG/DL (ref 0.9–1.3)
CREAT SERPL-MCNC: 1.2 MG/DL (ref 0.9–1.3)
CREAT SERPL-MCNC: 1.3 MG/DL (ref 0.9–1.3)
CREAT SERPL-MCNC: 1.3 MG/DL (ref 0.9–1.3)
CREAT SERPL-MCNC: 1.4 MG/DL (ref 0.9–1.3)
CREAT SERPL-MCNC: 1.4 MG/DL (ref 0.9–1.3)
CREAT SERPL-MCNC: 1.5 MG/DL (ref 0.9–1.3)
CREAT SERPL-MCNC: 1.6 MG/DL (ref 0.9–1.3)
CREAT SERPL-MCNC: 1.6 MG/DL (ref 0.9–1.3)
CREAT SERPL-MCNC: 1.7 MG/DL (ref 0.9–1.3)
CREAT SERPL-MCNC: 1.8 MG/DL (ref 0.9–1.3)
CREAT SERPL-MCNC: 1.8 MG/DL (ref 0.9–1.3)
CREAT SERPL-MCNC: 1.9 MG/DL (ref 0.9–1.3)
CREAT SERPL-MCNC: 1.9 MG/DL (ref 0.9–1.3)
CREAT SERPL-MCNC: 2 MG/DL (ref 0.9–1.3)
CREAT SERPL-MCNC: 2.4 MG/DL (ref 0.9–1.3)
CREAT SERPL-MCNC: 2.6 MG/DL (ref 0.9–1.3)
CREAT SERPL-MCNC: 2.8 MG/DL (ref 0.9–1.3)
CREAT SERPL-MCNC: 2.9 MG/DL (ref 0.9–1.3)
CREAT SERPL-MCNC: 2.9 MG/DL (ref 0.9–1.3)
CREAT SERPL-MCNC: 3 MG/DL (ref 0.9–1.3)
CREAT SERPL-MCNC: 3.2 MG/DL (ref 0.9–1.3)
CREAT SERPL-MCNC: 3.2 MG/DL (ref 0.9–1.3)
CREAT SERPL-MCNC: 3.4 MG/DL (ref 0.9–1.3)
CREAT SERPL-MCNC: 3.6 MG/DL (ref 0.9–1.3)
CREAT SERPL-MCNC: 3.8 MG/DL (ref 0.9–1.3)
CREAT SERPL-MCNC: 4.1 MG/DL (ref 0.9–1.3)
CREAT SERPL-MCNC: 4.2 MG/DL (ref 0.9–1.3)
CREAT SERPL-MCNC: 4.2 MG/DL (ref 0.9–1.3)
CREAT SERPL-MCNC: 4.3 MG/DL (ref 0.9–1.3)
CREAT SERPL-MCNC: 4.3 MG/DL (ref 0.9–1.3)
CREAT SERPL-MCNC: 4.4 MG/DL (ref 0.9–1.3)
CREAT SERPL-MCNC: 4.5 MG/DL (ref 0.9–1.3)
CREAT SERPL-MCNC: 4.5 MG/DL (ref 0.9–1.3)
CREAT SERPL-MCNC: 4.6 MG/DL (ref 0.9–1.3)
CREAT SERPL-MCNC: 4.6 MG/DL (ref 0.9–1.3)
CREAT SERPL-MCNC: 4.7 MG/DL (ref 0.9–1.3)
CREAT SERPL-MCNC: 5.1 MG/DL (ref 0.9–1.3)
CREAT SERPL-MCNC: 5.3 MG/DL (ref 0.9–1.3)
CREAT SERPL-MCNC: 5.3 MG/DL (ref 0.9–1.3)
CREAT SERPL-MCNC: 5.4 MG/DL (ref 0.9–1.3)
CREAT SERPL-MCNC: 5.5 MG/DL (ref 0.9–1.3)
CREAT SERPL-MCNC: 5.5 MG/DL (ref 0.9–1.3)
CREAT SERPL-MCNC: 5.6 MG/DL (ref 0.9–1.3)
CREAT SERPL-MCNC: 5.7 MG/DL (ref 0.9–1.3)
CREAT SERPL-MCNC: 5.8 MG/DL (ref 0.9–1.3)
CREAT SERPL-MCNC: 5.9 MG/DL (ref 0.9–1.3)
CREAT SERPL-MCNC: 6 MG/DL (ref 0.9–1.3)
CREAT SERPL-MCNC: 6.1 MG/DL (ref 0.9–1.3)
CREAT SERPL-MCNC: 6.6 MG/DL (ref 0.9–1.3)
CREAT SERPL-MCNC: 6.6 MG/DL (ref 0.9–1.3)
CREAT SERPL-MCNC: 6.7 MG/DL (ref 0.9–1.3)
CREAT SERPL-MCNC: 6.9 MG/DL (ref 0.9–1.3)
CREAT SERPL-MCNC: 7 MG/DL (ref 0.9–1.3)
CREAT SERPL-MCNC: 7.1 MG/DL (ref 0.9–1.3)
CREAT SERPL-MCNC: 7.3 MG/DL (ref 0.9–1.3)
CREAT SERPL-MCNC: 7.6 MG/DL (ref 0.9–1.3)
CREAT SERPL-MCNC: 7.7 MG/DL (ref 0.9–1.3)
CREAT SERPL-MCNC: 7.9 MG/DL (ref 0.9–1.3)
CREAT SERPL-MCNC: 8.9 MG/DL (ref 0.9–1.3)
CULTURE SURGICAL: ABNORMAL
CULTURE SURGICAL: ABNORMAL
CULTURE, BLOOD 2: ABNORMAL
CULTURE, BLOOD 2: ABNORMAL
CULTURE, BLOOD 2: NORMAL
CULTURE, RESPIRATORY: ABNORMAL
CULTURE, RESPIRATORY: NORMAL
DAT IGG CAPTURE: NORMAL
DAT IGG: NORMAL
DESCRIPTION BLOOD BANK: NORMAL
DOHLE BODIES: PRESENT
DOHLE BODIES: PRESENT
E (BIG) ANTIGEN: NORMAL
EKG ATRIAL RATE: 103 BPM
EKG ATRIAL RATE: 127 BPM
EKG ATRIAL RATE: 159 BPM
EKG ATRIAL RATE: 314 BPM
EKG ATRIAL RATE: 333 BPM
EKG ATRIAL RATE: 416 BPM
EKG ATRIAL RATE: 74 BPM
EKG ATRIAL RATE: 77 BPM
EKG ATRIAL RATE: 84 BPM
EKG ATRIAL RATE: 88 BPM
EKG DIAGNOSIS: NORMAL
EKG P AXIS: -78 DEGREES
EKG P AXIS: -88 DEGREES
EKG P AXIS: 261 DEGREES
EKG P AXIS: 28 DEGREES
EKG P AXIS: 37 DEGREES
EKG P AXIS: 43 DEGREES
EKG P AXIS: 45 DEGREES
EKG P AXIS: 46 DEGREES
EKG P-R INTERVAL: 144 MS
EKG P-R INTERVAL: 148 MS
EKG P-R INTERVAL: 148 MS
EKG P-R INTERVAL: 152 MS
EKG P-R INTERVAL: 162 MS
EKG P-R INTERVAL: 192 MS
EKG Q-T INTERVAL: 286 MS
EKG Q-T INTERVAL: 296 MS
EKG Q-T INTERVAL: 326 MS
EKG Q-T INTERVAL: 364 MS
EKG Q-T INTERVAL: 380 MS
EKG Q-T INTERVAL: 384 MS
EKG Q-T INTERVAL: 394 MS
EKG Q-T INTERVAL: 400 MS
EKG Q-T INTERVAL: 426 MS
EKG Q-T INTERVAL: 454 MS
EKG QRS DURATION: 106 MS
EKG QRS DURATION: 106 MS
EKG QRS DURATION: 108 MS
EKG QRS DURATION: 146 MS
EKG QRS DURATION: 72 MS
EKG QRS DURATION: 74 MS
EKG QRS DURATION: 90 MS
EKG QRS DURATION: 90 MS
EKG QRS DURATION: 92 MS
EKG QRS DURATION: 96 MS
EKG QTC CALCULATION (BAZETT): 451 MS
EKG QTC CALCULATION (BAZETT): 467 MS
EKG QTC CALCULATION (BAZETT): 472 MS
EKG QTC CALCULATION (BAZETT): 473 MS
EKG QTC CALCULATION (BAZETT): 476 MS
EKG QTC CALCULATION (BAZETT): 478 MS
EKG QTC CALCULATION (BAZETT): 497 MS
EKG QTC CALCULATION (BAZETT): 513 MS
EKG QTC CALCULATION (BAZETT): 516 MS
EKG QTC CALCULATION (BAZETT): 555 MS
EKG R AXIS: -1 DEGREES
EKG R AXIS: -10 DEGREES
EKG R AXIS: -10 DEGREES
EKG R AXIS: -13 DEGREES
EKG R AXIS: -15 DEGREES
EKG R AXIS: -16 DEGREES
EKG R AXIS: -22 DEGREES
EKG R AXIS: -7 DEGREES
EKG T AXIS: -14 DEGREES
EKG T AXIS: -4 DEGREES
EKG T AXIS: -9 DEGREES
EKG T AXIS: 11 DEGREES
EKG T AXIS: 124 DEGREES
EKG T AXIS: 158 DEGREES
EKG T AXIS: 21 DEGREES
EKG T AXIS: 33 DEGREES
EKG T AXIS: 59 DEGREES
EKG T AXIS: 71 DEGREES
EKG VENTRICULAR RATE: 103 BPM
EKG VENTRICULAR RATE: 140 BPM
EKG VENTRICULAR RATE: 150 BPM
EKG VENTRICULAR RATE: 151 BPM
EKG VENTRICULAR RATE: 157 BPM
EKG VENTRICULAR RATE: 74 BPM
EKG VENTRICULAR RATE: 77 BPM
EKG VENTRICULAR RATE: 84 BPM
EKG VENTRICULAR RATE: 88 BPM
EKG VENTRICULAR RATE: 89 BPM
EOSIN: 2 %
EOSIN: 2 %
EOSINOPHILS ABSOLUTE: 0 K/UL (ref 0–0.6)
EOSINOPHILS ABSOLUTE: 0.1 K/UL (ref 0–0.6)
EOSINOPHILS ABSOLUTE: 0.2 K/UL (ref 0–0.6)
EOSINOPHILS ABSOLUTE: 0.3 K/UL (ref 0–0.6)
EOSINOPHILS ABSOLUTE: 0.4 K/UL (ref 0–0.6)
EOSINOPHILS ABSOLUTE: 0.5 K/UL (ref 0–0.6)
EOSINOPHILS ABSOLUTE: 0.5 K/UL (ref 0–0.6)
EOSINOPHILS ABSOLUTE: 0.6 K/UL (ref 0–0.6)
EOSINOPHILS ABSOLUTE: 0.7 K/UL (ref 0–0.6)
EOSINOPHILS RELATIVE PERCENT: 0 %
EOSINOPHILS RELATIVE PERCENT: 0.1 %
EOSINOPHILS RELATIVE PERCENT: 0.4 %
EOSINOPHILS RELATIVE PERCENT: 0.5 %
EOSINOPHILS RELATIVE PERCENT: 0.7 %
EOSINOPHILS RELATIVE PERCENT: 1 %
EOSINOPHILS RELATIVE PERCENT: 1.6 %
EOSINOPHILS RELATIVE PERCENT: 1.7 %
EOSINOPHILS RELATIVE PERCENT: 1.9 %
EOSINOPHILS RELATIVE PERCENT: 2 %
EOSINOPHILS RELATIVE PERCENT: 2.2 %
EOSINOPHILS RELATIVE PERCENT: 2.3 %
EOSINOPHILS RELATIVE PERCENT: 2.3 %
EOSINOPHILS RELATIVE PERCENT: 2.6 %
EOSINOPHILS RELATIVE PERCENT: 2.7 %
EOSINOPHILS RELATIVE PERCENT: 2.8 %
EOSINOPHILS RELATIVE PERCENT: 2.8 %
EOSINOPHILS RELATIVE PERCENT: 2.9 %
EOSINOPHILS RELATIVE PERCENT: 2.9 %
EOSINOPHILS RELATIVE PERCENT: 3 %
EOSINOPHILS RELATIVE PERCENT: 3.1 %
EOSINOPHILS RELATIVE PERCENT: 3.1 %
EOSINOPHILS RELATIVE PERCENT: 3.2 %
EOSINOPHILS RELATIVE PERCENT: 3.9 %
EOSINOPHILS RELATIVE PERCENT: 4.1 %
EOSINOPHILS RELATIVE PERCENT: 4.4 %
EOSINOPHILS RELATIVE PERCENT: 4.7 %
EOSINOPHILS RELATIVE PERCENT: 4.8 %
EOSINOPHILS RELATIVE PERCENT: 4.9 %
EOSINOPHILS RELATIVE PERCENT: 4.9 %
EOSINOPHILS RELATIVE PERCENT: 5 %
EOSINOPHILS RELATIVE PERCENT: 5 %
EOSINOPHILS RELATIVE PERCENT: 5.3 %
EOSINOPHILS RELATIVE PERCENT: 5.8 %
EOSINOPHILS RELATIVE PERCENT: 6.4 %
EOSINOPHILS RELATIVE PERCENT: 7.1 %
EOSINOPHILS RELATIVE PERCENT: 7.2 %
EPITHELIAL CELLS FLUID: 26 %
EPITHELIAL CELLS, UA: ABNORMAL /HPF (ref 0–5)
EPITHELIAL CELLS, UA: ABNORMAL /HPF (ref 0–5)
ESTIMATED AVERAGE GLUCOSE: 257.5 MG/DL
FIBRINOGEN: 313 MG/DL (ref 200–397)
FIBRINOGEN: 428 MG/DL (ref 200–397)
FIBRINOGEN: >1000 MG/DL (ref 200–397)
FOLATE: 18.63 NG/ML (ref 4.78–24.2)
GAMMA GLUTAMYL TRANSFERASE: 272 U/L (ref 8–61)
GFR AFRICAN AMERICAN: 10
GFR AFRICAN AMERICAN: 11
GFR AFRICAN AMERICAN: 12
GFR AFRICAN AMERICAN: 12
GFR AFRICAN AMERICAN: 13
GFR AFRICAN AMERICAN: 14
GFR AFRICAN AMERICAN: 15
GFR AFRICAN AMERICAN: 16
GFR AFRICAN AMERICAN: 17
GFR AFRICAN AMERICAN: 18
GFR AFRICAN AMERICAN: 19
GFR AFRICAN AMERICAN: 21
GFR AFRICAN AMERICAN: 22
GFR AFRICAN AMERICAN: 24
GFR AFRICAN AMERICAN: 26
GFR AFRICAN AMERICAN: 26
GFR AFRICAN AMERICAN: 28
GFR AFRICAN AMERICAN: 29
GFR AFRICAN AMERICAN: 29
GFR AFRICAN AMERICAN: 30
GFR AFRICAN AMERICAN: 33
GFR AFRICAN AMERICAN: 36
GFR AFRICAN AMERICAN: 44
GFR AFRICAN AMERICAN: 47
GFR AFRICAN AMERICAN: 47
GFR AFRICAN AMERICAN: 50
GFR AFRICAN AMERICAN: 50
GFR AFRICAN AMERICAN: 53
GFR AFRICAN AMERICAN: 57
GFR AFRICAN AMERICAN: 57
GFR AFRICAN AMERICAN: 8
GFR AFRICAN AMERICAN: 9
GFR AFRICAN AMERICAN: >60
GFR NON-AFRICAN AMERICAN: 10
GFR NON-AFRICAN AMERICAN: 11
GFR NON-AFRICAN AMERICAN: 12
GFR NON-AFRICAN AMERICAN: 14
GFR NON-AFRICAN AMERICAN: 15
GFR NON-AFRICAN AMERICAN: 16
GFR NON-AFRICAN AMERICAN: 17
GFR NON-AFRICAN AMERICAN: 18
GFR NON-AFRICAN AMERICAN: 20
GFR NON-AFRICAN AMERICAN: 21
GFR NON-AFRICAN AMERICAN: 21
GFR NON-AFRICAN AMERICAN: 23
GFR NON-AFRICAN AMERICAN: 24
GFR NON-AFRICAN AMERICAN: 24
GFR NON-AFRICAN AMERICAN: 25
GFR NON-AFRICAN AMERICAN: 27
GFR NON-AFRICAN AMERICAN: 29
GFR NON-AFRICAN AMERICAN: 36
GFR NON-AFRICAN AMERICAN: 39
GFR NON-AFRICAN AMERICAN: 39
GFR NON-AFRICAN AMERICAN: 41
GFR NON-AFRICAN AMERICAN: 41
GFR NON-AFRICAN AMERICAN: 44
GFR NON-AFRICAN AMERICAN: 47
GFR NON-AFRICAN AMERICAN: 47
GFR NON-AFRICAN AMERICAN: 51
GFR NON-AFRICAN AMERICAN: 55
GFR NON-AFRICAN AMERICAN: 55
GFR NON-AFRICAN AMERICAN: 60
GFR NON-AFRICAN AMERICAN: 60
GFR NON-AFRICAN AMERICAN: 7
GFR NON-AFRICAN AMERICAN: 7
GFR NON-AFRICAN AMERICAN: 8
GFR NON-AFRICAN AMERICAN: 9
GFR NON-AFRICAN AMERICAN: >60
GLUCOSE BLD-MCNC: 101 MG/DL (ref 70–99)
GLUCOSE BLD-MCNC: 102 MG/DL (ref 70–99)
GLUCOSE BLD-MCNC: 103 MG/DL (ref 70–99)
GLUCOSE BLD-MCNC: 104 MG/DL (ref 70–99)
GLUCOSE BLD-MCNC: 106 MG/DL (ref 70–99)
GLUCOSE BLD-MCNC: 108 MG/DL (ref 70–99)
GLUCOSE BLD-MCNC: 110 MG/DL (ref 70–99)
GLUCOSE BLD-MCNC: 111 MG/DL (ref 70–99)
GLUCOSE BLD-MCNC: 112 MG/DL (ref 70–99)
GLUCOSE BLD-MCNC: 113 MG/DL (ref 70–99)
GLUCOSE BLD-MCNC: 114 MG/DL (ref 70–99)
GLUCOSE BLD-MCNC: 114 MG/DL (ref 70–99)
GLUCOSE BLD-MCNC: 115 MG/DL (ref 70–99)
GLUCOSE BLD-MCNC: 115 MG/DL (ref 70–99)
GLUCOSE BLD-MCNC: 116 MG/DL (ref 70–99)
GLUCOSE BLD-MCNC: 117 MG/DL (ref 70–99)
GLUCOSE BLD-MCNC: 118 MG/DL (ref 70–99)
GLUCOSE BLD-MCNC: 119 MG/DL (ref 70–99)
GLUCOSE BLD-MCNC: 119 MG/DL (ref 70–99)
GLUCOSE BLD-MCNC: 120 MG/DL (ref 70–99)
GLUCOSE BLD-MCNC: 121 MG/DL (ref 70–99)
GLUCOSE BLD-MCNC: 122 MG/DL (ref 70–99)
GLUCOSE BLD-MCNC: 123 MG/DL (ref 70–99)
GLUCOSE BLD-MCNC: 124 MG/DL (ref 70–99)
GLUCOSE BLD-MCNC: 125 MG/DL (ref 70–99)
GLUCOSE BLD-MCNC: 125 MG/DL (ref 70–99)
GLUCOSE BLD-MCNC: 126 MG/DL (ref 70–99)
GLUCOSE BLD-MCNC: 126 MG/DL (ref 70–99)
GLUCOSE BLD-MCNC: 127 MG/DL (ref 70–99)
GLUCOSE BLD-MCNC: 127 MG/DL (ref 70–99)
GLUCOSE BLD-MCNC: 128 MG/DL (ref 70–99)
GLUCOSE BLD-MCNC: 129 MG/DL (ref 70–99)
GLUCOSE BLD-MCNC: 130 MG/DL (ref 70–99)
GLUCOSE BLD-MCNC: 131 MG/DL (ref 70–99)
GLUCOSE BLD-MCNC: 131 MG/DL (ref 70–99)
GLUCOSE BLD-MCNC: 132 MG/DL (ref 70–99)
GLUCOSE BLD-MCNC: 132 MG/DL (ref 70–99)
GLUCOSE BLD-MCNC: 133 MG/DL (ref 70–99)
GLUCOSE BLD-MCNC: 134 MG/DL (ref 70–99)
GLUCOSE BLD-MCNC: 135 MG/DL (ref 70–99)
GLUCOSE BLD-MCNC: 136 MG/DL (ref 70–99)
GLUCOSE BLD-MCNC: 136 MG/DL (ref 70–99)
GLUCOSE BLD-MCNC: 137 MG/DL (ref 70–99)
GLUCOSE BLD-MCNC: 138 MG/DL (ref 70–99)
GLUCOSE BLD-MCNC: 139 MG/DL (ref 70–99)
GLUCOSE BLD-MCNC: 140 MG/DL (ref 70–99)
GLUCOSE BLD-MCNC: 140 MG/DL (ref 70–99)
GLUCOSE BLD-MCNC: 141 MG/DL (ref 70–99)
GLUCOSE BLD-MCNC: 142 MG/DL (ref 70–99)
GLUCOSE BLD-MCNC: 142 MG/DL (ref 70–99)
GLUCOSE BLD-MCNC: 143 MG/DL (ref 70–99)
GLUCOSE BLD-MCNC: 144 MG/DL (ref 70–99)
GLUCOSE BLD-MCNC: 145 MG/DL (ref 70–99)
GLUCOSE BLD-MCNC: 146 MG/DL (ref 70–99)
GLUCOSE BLD-MCNC: 147 MG/DL (ref 70–99)
GLUCOSE BLD-MCNC: 148 MG/DL (ref 70–99)
GLUCOSE BLD-MCNC: 149 MG/DL (ref 70–99)
GLUCOSE BLD-MCNC: 150 MG/DL (ref 70–99)
GLUCOSE BLD-MCNC: 151 MG/DL (ref 70–99)
GLUCOSE BLD-MCNC: 152 MG/DL (ref 70–99)
GLUCOSE BLD-MCNC: 153 MG/DL (ref 70–99)
GLUCOSE BLD-MCNC: 154 MG/DL (ref 70–99)
GLUCOSE BLD-MCNC: 155 MG/DL (ref 70–99)
GLUCOSE BLD-MCNC: 156 MG/DL (ref 70–99)
GLUCOSE BLD-MCNC: 157 MG/DL (ref 70–99)
GLUCOSE BLD-MCNC: 158 MG/DL (ref 70–99)
GLUCOSE BLD-MCNC: 159 MG/DL (ref 70–99)
GLUCOSE BLD-MCNC: 160 MG/DL (ref 70–99)
GLUCOSE BLD-MCNC: 161 MG/DL (ref 70–99)
GLUCOSE BLD-MCNC: 161 MG/DL (ref 70–99)
GLUCOSE BLD-MCNC: 162 MG/DL (ref 70–99)
GLUCOSE BLD-MCNC: 163 MG/DL (ref 70–99)
GLUCOSE BLD-MCNC: 164 MG/DL (ref 70–99)
GLUCOSE BLD-MCNC: 165 MG/DL (ref 70–99)
GLUCOSE BLD-MCNC: 166 MG/DL (ref 70–99)
GLUCOSE BLD-MCNC: 167 MG/DL (ref 70–99)
GLUCOSE BLD-MCNC: 168 MG/DL (ref 70–99)
GLUCOSE BLD-MCNC: 169 MG/DL (ref 70–99)
GLUCOSE BLD-MCNC: 170 MG/DL (ref 70–99)
GLUCOSE BLD-MCNC: 171 MG/DL (ref 70–99)
GLUCOSE BLD-MCNC: 172 MG/DL (ref 70–99)
GLUCOSE BLD-MCNC: 173 MG/DL (ref 70–99)
GLUCOSE BLD-MCNC: 174 MG/DL (ref 70–99)
GLUCOSE BLD-MCNC: 175 MG/DL (ref 70–99)
GLUCOSE BLD-MCNC: 176 MG/DL (ref 70–99)
GLUCOSE BLD-MCNC: 177 MG/DL (ref 70–99)
GLUCOSE BLD-MCNC: 178 MG/DL (ref 70–99)
GLUCOSE BLD-MCNC: 179 MG/DL (ref 70–99)
GLUCOSE BLD-MCNC: 180 MG/DL (ref 70–99)
GLUCOSE BLD-MCNC: 181 MG/DL (ref 70–99)
GLUCOSE BLD-MCNC: 182 MG/DL (ref 70–99)
GLUCOSE BLD-MCNC: 183 MG/DL (ref 70–99)
GLUCOSE BLD-MCNC: 184 MG/DL (ref 70–99)
GLUCOSE BLD-MCNC: 185 MG/DL (ref 70–99)
GLUCOSE BLD-MCNC: 186 MG/DL (ref 70–99)
GLUCOSE BLD-MCNC: 187 MG/DL (ref 70–99)
GLUCOSE BLD-MCNC: 188 MG/DL (ref 70–99)
GLUCOSE BLD-MCNC: 189 MG/DL (ref 70–99)
GLUCOSE BLD-MCNC: 190 MG/DL (ref 70–99)
GLUCOSE BLD-MCNC: 191 MG/DL (ref 70–99)
GLUCOSE BLD-MCNC: 192 MG/DL (ref 70–99)
GLUCOSE BLD-MCNC: 193 MG/DL (ref 70–99)
GLUCOSE BLD-MCNC: 193 MG/DL (ref 70–99)
GLUCOSE BLD-MCNC: 194 MG/DL (ref 70–99)
GLUCOSE BLD-MCNC: 195 MG/DL (ref 70–99)
GLUCOSE BLD-MCNC: 196 MG/DL (ref 70–99)
GLUCOSE BLD-MCNC: 197 MG/DL (ref 70–99)
GLUCOSE BLD-MCNC: 198 MG/DL (ref 70–99)
GLUCOSE BLD-MCNC: 199 MG/DL (ref 70–99)
GLUCOSE BLD-MCNC: 200 MG/DL (ref 70–99)
GLUCOSE BLD-MCNC: 201 MG/DL (ref 70–99)
GLUCOSE BLD-MCNC: 202 MG/DL (ref 70–99)
GLUCOSE BLD-MCNC: 203 MG/DL (ref 70–99)
GLUCOSE BLD-MCNC: 204 MG/DL (ref 70–99)
GLUCOSE BLD-MCNC: 204 MG/DL (ref 70–99)
GLUCOSE BLD-MCNC: 205 MG/DL (ref 70–99)
GLUCOSE BLD-MCNC: 206 MG/DL (ref 70–99)
GLUCOSE BLD-MCNC: 207 MG/DL (ref 70–99)
GLUCOSE BLD-MCNC: 208 MG/DL (ref 70–99)
GLUCOSE BLD-MCNC: 209 MG/DL (ref 70–99)
GLUCOSE BLD-MCNC: 210 MG/DL (ref 70–99)
GLUCOSE BLD-MCNC: 210 MG/DL (ref 70–99)
GLUCOSE BLD-MCNC: 211 MG/DL (ref 70–99)
GLUCOSE BLD-MCNC: 212 MG/DL (ref 70–99)
GLUCOSE BLD-MCNC: 212 MG/DL (ref 70–99)
GLUCOSE BLD-MCNC: 213 MG/DL (ref 70–99)
GLUCOSE BLD-MCNC: 214 MG/DL (ref 70–99)
GLUCOSE BLD-MCNC: 215 MG/DL (ref 70–99)
GLUCOSE BLD-MCNC: 216 MG/DL (ref 70–99)
GLUCOSE BLD-MCNC: 217 MG/DL (ref 70–99)
GLUCOSE BLD-MCNC: 217 MG/DL (ref 70–99)
GLUCOSE BLD-MCNC: 218 MG/DL (ref 70–99)
GLUCOSE BLD-MCNC: 218 MG/DL (ref 70–99)
GLUCOSE BLD-MCNC: 219 MG/DL (ref 70–99)
GLUCOSE BLD-MCNC: 220 MG/DL (ref 70–99)
GLUCOSE BLD-MCNC: 221 MG/DL (ref 70–99)
GLUCOSE BLD-MCNC: 222 MG/DL (ref 70–99)
GLUCOSE BLD-MCNC: 223 MG/DL (ref 70–99)
GLUCOSE BLD-MCNC: 224 MG/DL (ref 70–99)
GLUCOSE BLD-MCNC: 225 MG/DL (ref 70–99)
GLUCOSE BLD-MCNC: 226 MG/DL (ref 70–99)
GLUCOSE BLD-MCNC: 226 MG/DL (ref 70–99)
GLUCOSE BLD-MCNC: 227 MG/DL (ref 70–99)
GLUCOSE BLD-MCNC: 228 MG/DL (ref 70–99)
GLUCOSE BLD-MCNC: 229 MG/DL (ref 70–99)
GLUCOSE BLD-MCNC: 229 MG/DL (ref 70–99)
GLUCOSE BLD-MCNC: 230 MG/DL (ref 70–99)
GLUCOSE BLD-MCNC: 231 MG/DL (ref 70–99)
GLUCOSE BLD-MCNC: 231 MG/DL (ref 70–99)
GLUCOSE BLD-MCNC: 232 MG/DL (ref 70–99)
GLUCOSE BLD-MCNC: 234 MG/DL (ref 70–99)
GLUCOSE BLD-MCNC: 234 MG/DL (ref 70–99)
GLUCOSE BLD-MCNC: 235 MG/DL (ref 70–99)
GLUCOSE BLD-MCNC: 237 MG/DL (ref 70–99)
GLUCOSE BLD-MCNC: 237 MG/DL (ref 70–99)
GLUCOSE BLD-MCNC: 238 MG/DL (ref 70–99)
GLUCOSE BLD-MCNC: 239 MG/DL (ref 70–99)
GLUCOSE BLD-MCNC: 239 MG/DL (ref 70–99)
GLUCOSE BLD-MCNC: 242 MG/DL (ref 70–99)
GLUCOSE BLD-MCNC: 243 MG/DL (ref 70–99)
GLUCOSE BLD-MCNC: 243 MG/DL (ref 70–99)
GLUCOSE BLD-MCNC: 244 MG/DL (ref 70–99)
GLUCOSE BLD-MCNC: 245 MG/DL (ref 70–99)
GLUCOSE BLD-MCNC: 247 MG/DL (ref 70–99)
GLUCOSE BLD-MCNC: 248 MG/DL (ref 70–99)
GLUCOSE BLD-MCNC: 252 MG/DL (ref 70–99)
GLUCOSE BLD-MCNC: 252 MG/DL (ref 70–99)
GLUCOSE BLD-MCNC: 255 MG/DL (ref 70–99)
GLUCOSE BLD-MCNC: 256 MG/DL (ref 70–99)
GLUCOSE BLD-MCNC: 257 MG/DL (ref 70–99)
GLUCOSE BLD-MCNC: 260 MG/DL (ref 70–99)
GLUCOSE BLD-MCNC: 260 MG/DL (ref 70–99)
GLUCOSE BLD-MCNC: 261 MG/DL (ref 70–99)
GLUCOSE BLD-MCNC: 264 MG/DL (ref 70–99)
GLUCOSE BLD-MCNC: 266 MG/DL (ref 70–99)
GLUCOSE BLD-MCNC: 270 MG/DL (ref 70–99)
GLUCOSE BLD-MCNC: 271 MG/DL (ref 70–99)
GLUCOSE BLD-MCNC: 274 MG/DL (ref 70–99)
GLUCOSE BLD-MCNC: 287 MG/DL (ref 70–99)
GLUCOSE BLD-MCNC: 287 MG/DL (ref 70–99)
GLUCOSE BLD-MCNC: 289 MG/DL (ref 70–99)
GLUCOSE BLD-MCNC: 293 MG/DL (ref 70–99)
GLUCOSE BLD-MCNC: 299 MG/DL (ref 70–99)
GLUCOSE BLD-MCNC: 305 MG/DL (ref 70–99)
GLUCOSE BLD-MCNC: 308 MG/DL (ref 70–99)
GLUCOSE BLD-MCNC: 308 MG/DL (ref 70–99)
GLUCOSE BLD-MCNC: 310 MG/DL (ref 70–99)
GLUCOSE BLD-MCNC: 313 MG/DL (ref 70–99)
GLUCOSE BLD-MCNC: 316 MG/DL (ref 70–99)
GLUCOSE BLD-MCNC: 322 MG/DL (ref 70–99)
GLUCOSE BLD-MCNC: 325 MG/DL (ref 70–99)
GLUCOSE BLD-MCNC: 326 MG/DL (ref 70–99)
GLUCOSE BLD-MCNC: 326 MG/DL (ref 70–99)
GLUCOSE BLD-MCNC: 328 MG/DL (ref 70–99)
GLUCOSE BLD-MCNC: 331 MG/DL (ref 70–99)
GLUCOSE BLD-MCNC: 332 MG/DL (ref 70–99)
GLUCOSE BLD-MCNC: 333 MG/DL (ref 70–99)
GLUCOSE BLD-MCNC: 338 MG/DL (ref 70–99)
GLUCOSE BLD-MCNC: 338 MG/DL (ref 70–99)
GLUCOSE BLD-MCNC: 340 MG/DL (ref 70–99)
GLUCOSE BLD-MCNC: 353 MG/DL (ref 70–99)
GLUCOSE BLD-MCNC: 356 MG/DL (ref 70–99)
GLUCOSE BLD-MCNC: 364 MG/DL (ref 70–99)
GLUCOSE BLD-MCNC: 374 MG/DL (ref 70–99)
GLUCOSE BLD-MCNC: 378 MG/DL (ref 70–99)
GLUCOSE BLD-MCNC: 379 MG/DL (ref 70–99)
GLUCOSE BLD-MCNC: 390 MG/DL (ref 70–99)
GLUCOSE BLD-MCNC: 391 MG/DL (ref 70–99)
GLUCOSE BLD-MCNC: 402 MG/DL (ref 70–99)
GLUCOSE BLD-MCNC: 403 MG/DL (ref 70–99)
GLUCOSE BLD-MCNC: 409 MG/DL (ref 70–99)
GLUCOSE BLD-MCNC: 412 MG/DL (ref 70–99)
GLUCOSE BLD-MCNC: 412 MG/DL (ref 70–99)
GLUCOSE BLD-MCNC: 416 MG/DL (ref 70–99)
GLUCOSE BLD-MCNC: 63 MG/DL (ref 70–99)
GLUCOSE BLD-MCNC: 75 MG/DL (ref 70–99)
GLUCOSE BLD-MCNC: 81 MG/DL (ref 70–99)
GLUCOSE BLD-MCNC: 83 MG/DL (ref 70–99)
GLUCOSE BLD-MCNC: 86 MG/DL (ref 70–99)
GLUCOSE BLD-MCNC: 88 MG/DL (ref 70–99)
GLUCOSE BLD-MCNC: 89 MG/DL (ref 70–99)
GLUCOSE BLD-MCNC: 92 MG/DL (ref 70–99)
GLUCOSE BLD-MCNC: 94 MG/DL (ref 70–99)
GLUCOSE BLD-MCNC: 96 MG/DL (ref 70–99)
GLUCOSE BLD-MCNC: 96 MG/DL (ref 70–99)
GLUCOSE BLD-MCNC: 99 MG/DL (ref 70–99)
GLUCOSE URINE: 100 MG/DL
GLUCOSE URINE: 500 MG/DL
GLUCOSE URINE: NEGATIVE MG/DL
GRAM STAIN RESULT: ABNORMAL
GRAM STAIN RESULT: NORMAL
H PYLORI ANTIGEN STOOL: NEGATIVE
HBA1C MFR BLD: 10.6 %
HBV SURFACE AB TITR SER: <3.5 MIU/ML
HCO3 ARTERIAL: 13.5 MMOL/L (ref 21–29)
HCO3 ARTERIAL: 13.6 MMOL/L (ref 21–29)
HCO3 ARTERIAL: 16.1 MMOL/L (ref 21–29)
HCO3 ARTERIAL: 16.6 MMOL/L (ref 21–29)
HCO3 ARTERIAL: 17 MMOL/L (ref 21–29)
HCO3 ARTERIAL: 17.1 MMOL/L (ref 21–29)
HCO3 ARTERIAL: 17.2 MMOL/L (ref 21–29)
HCO3 ARTERIAL: 17.3 MMOL/L (ref 21–29)
HCO3 ARTERIAL: 17.5 MMOL/L (ref 21–29)
HCO3 ARTERIAL: 17.8 MMOL/L (ref 21–29)
HCO3 ARTERIAL: 17.9 MMOL/L (ref 21–29)
HCO3 ARTERIAL: 18.3 MMOL/L (ref 21–29)
HCO3 ARTERIAL: 18.6 MMOL/L (ref 21–29)
HCO3 ARTERIAL: 18.8 MMOL/L (ref 21–29)
HCO3 ARTERIAL: 18.8 MMOL/L (ref 21–29)
HCO3 ARTERIAL: 19.1 MMOL/L (ref 21–29)
HCO3 ARTERIAL: 19.2 MMOL/L (ref 21–29)
HCO3 ARTERIAL: 19.5 MMOL/L (ref 21–29)
HCO3 ARTERIAL: 19.5 MMOL/L (ref 21–29)
HCO3 ARTERIAL: 19.6 MMOL/L (ref 21–29)
HCO3 ARTERIAL: 19.8 MMOL/L (ref 21–29)
HCO3 ARTERIAL: 19.8 MMOL/L (ref 21–29)
HCO3 ARTERIAL: 20.2 MMOL/L (ref 21–29)
HCO3 ARTERIAL: 20.5 MMOL/L (ref 21–29)
HCO3 ARTERIAL: 21 MMOL/L (ref 21–29)
HCO3 ARTERIAL: 21.2 MMOL/L (ref 21–29)
HCO3 ARTERIAL: 21.3 MMOL/L (ref 21–29)
HCO3 ARTERIAL: 21.6 MMOL/L (ref 21–29)
HCO3 ARTERIAL: 22.2 MMOL/L (ref 21–29)
HCO3 ARTERIAL: 22.4 MMOL/L (ref 21–29)
HCO3 ARTERIAL: 22.6 MMOL/L (ref 21–29)
HCO3 ARTERIAL: 22.9 MMOL/L (ref 21–29)
HCO3 ARTERIAL: 23 MMOL/L (ref 21–29)
HCO3 ARTERIAL: 23.1 MMOL/L (ref 21–29)
HCO3 ARTERIAL: 23.3 MMOL/L (ref 21–29)
HCO3 ARTERIAL: 23.6 MMOL/L (ref 21–29)
HCO3 ARTERIAL: 23.9 MMOL/L (ref 21–29)
HCO3 VENOUS: 15.9 MMOL/L (ref 23–29)
HCO3 VENOUS: 19 MMOL/L (ref 23–29)
HCO3 VENOUS: 19.6 MMOL/L (ref 23–29)
HCO3 VENOUS: 24.7 MMOL/L (ref 23–29)
HCT VFR BLD CALC: 16.8 % (ref 40.5–52.5)
HCT VFR BLD CALC: 17.6 % (ref 40.5–52.5)
HCT VFR BLD CALC: 18.2 % (ref 40.5–52.5)
HCT VFR BLD CALC: 18.8 % (ref 40.5–52.5)
HCT VFR BLD CALC: 18.9 % (ref 40.5–52.5)
HCT VFR BLD CALC: 19 % (ref 40.5–52.5)
HCT VFR BLD CALC: 19.3 % (ref 40.5–52.5)
HCT VFR BLD CALC: 19.5 % (ref 40.5–52.5)
HCT VFR BLD CALC: 19.6 % (ref 40.5–52.5)
HCT VFR BLD CALC: 19.6 % (ref 40.5–52.5)
HCT VFR BLD CALC: 19.7 % (ref 40.5–52.5)
HCT VFR BLD CALC: 20 % (ref 40.5–52.5)
HCT VFR BLD CALC: 20 % (ref 40.5–52.5)
HCT VFR BLD CALC: 20.2 % (ref 40.5–52.5)
HCT VFR BLD CALC: 20.2 % (ref 40.5–52.5)
HCT VFR BLD CALC: 20.5 % (ref 40.5–52.5)
HCT VFR BLD CALC: 20.7 % (ref 40.5–52.5)
HCT VFR BLD CALC: 20.7 % (ref 40.5–52.5)
HCT VFR BLD CALC: 20.8 % (ref 40.5–52.5)
HCT VFR BLD CALC: 20.9 % (ref 40.5–52.5)
HCT VFR BLD CALC: 21.2 % (ref 40.5–52.5)
HCT VFR BLD CALC: 21.3 % (ref 40.5–52.5)
HCT VFR BLD CALC: 21.3 % (ref 40.5–52.5)
HCT VFR BLD CALC: 21.4 % (ref 40.5–52.5)
HCT VFR BLD CALC: 21.6 % (ref 40.5–52.5)
HCT VFR BLD CALC: 21.7 % (ref 40.5–52.5)
HCT VFR BLD CALC: 21.8 % (ref 40.5–52.5)
HCT VFR BLD CALC: 21.9 % (ref 40.5–52.5)
HCT VFR BLD CALC: 22 % (ref 40.5–52.5)
HCT VFR BLD CALC: 22 % (ref 40.5–52.5)
HCT VFR BLD CALC: 22.1 % (ref 40.5–52.5)
HCT VFR BLD CALC: 22.2 % (ref 40.5–52.5)
HCT VFR BLD CALC: 22.2 % (ref 40.5–52.5)
HCT VFR BLD CALC: 22.3 % (ref 40.5–52.5)
HCT VFR BLD CALC: 22.4 % (ref 40.5–52.5)
HCT VFR BLD CALC: 22.5 % (ref 40.5–52.5)
HCT VFR BLD CALC: 22.6 % (ref 40.5–52.5)
HCT VFR BLD CALC: 22.6 % (ref 40.5–52.5)
HCT VFR BLD CALC: 22.7 % (ref 40.5–52.5)
HCT VFR BLD CALC: 22.8 % (ref 40.5–52.5)
HCT VFR BLD CALC: 23 % (ref 40.5–52.5)
HCT VFR BLD CALC: 23.1 % (ref 40.5–52.5)
HCT VFR BLD CALC: 23.3 % (ref 40.5–52.5)
HCT VFR BLD CALC: 23.6 % (ref 40.5–52.5)
HCT VFR BLD CALC: 23.7 % (ref 40.5–52.5)
HCT VFR BLD CALC: 23.7 % (ref 40.5–52.5)
HCT VFR BLD CALC: 23.9 % (ref 40.5–52.5)
HCT VFR BLD CALC: 24 % (ref 40.5–52.5)
HCT VFR BLD CALC: 24.2 % (ref 40.5–52.5)
HCT VFR BLD CALC: 24.4 % (ref 40.5–52.5)
HCT VFR BLD CALC: 24.9 % (ref 40.5–52.5)
HCT VFR BLD CALC: 25.1 % (ref 40.5–52.5)
HCT VFR BLD CALC: 25.3 % (ref 40.5–52.5)
HCT VFR BLD CALC: 25.7 % (ref 40.5–52.5)
HCT VFR BLD CALC: 25.7 % (ref 40.5–52.5)
HCT VFR BLD CALC: 25.8 % (ref 40.5–52.5)
HCT VFR BLD CALC: 26.1 % (ref 40.5–52.5)
HCT VFR BLD CALC: 26.3 % (ref 40.5–52.5)
HCT VFR BLD CALC: 26.4 % (ref 40.5–52.5)
HCT VFR BLD CALC: 26.5 % (ref 40.5–52.5)
HCT VFR BLD CALC: 26.9 % (ref 40.5–52.5)
HCT VFR BLD CALC: 27.2 % (ref 40.5–52.5)
HCT VFR BLD CALC: 27.3 % (ref 40.5–52.5)
HCT VFR BLD CALC: 27.4 % (ref 40.5–52.5)
HCT VFR BLD CALC: 27.6 % (ref 40.5–52.5)
HCT VFR BLD CALC: 28.5 % (ref 40.5–52.5)
HCT VFR BLD CALC: 29 % (ref 40.5–52.5)
HCT VFR BLD CALC: 30.8 % (ref 40.5–52.5)
HCT VFR BLD CALC: 31.3 % (ref 40.5–52.5)
HCT VFR BLD CALC: 32.2 % (ref 40.5–52.5)
HCT VFR BLD CALC: 32.4 % (ref 40.5–52.5)
HCT VFR BLD CALC: 32.6 % (ref 40.5–52.5)
HCT VFR BLD CALC: 33.6 % (ref 40.5–52.5)
HCT VFR BLD CALC: 33.8 % (ref 40.5–52.5)
HCT VFR BLD CALC: 34.1 % (ref 40.5–52.5)
HCT VFR BLD CALC: 34.4 % (ref 40.5–52.5)
HCT VFR BLD CALC: 34.7 % (ref 40.5–52.5)
HCT VFR BLD CALC: 34.9 % (ref 40.5–52.5)
HCT VFR BLD CALC: 35.6 % (ref 40.5–52.5)
HCT VFR BLD CALC: 36.4 % (ref 40.5–52.5)
HCT VFR BLD CALC: 36.5 % (ref 40.5–52.5)
HCT VFR BLD CALC: 36.6 % (ref 40.5–52.5)
HEMATOLOGY PATH CONSULT: NO
HEMATOLOGY PATH CONSULT: NORMAL
HEMATOLOGY PATH CONSULT: YES
HEMOGLOBIN, ART, EXTENDED: 10.1 G/DL (ref 13.5–17.5)
HEMOGLOBIN, ART, EXTENDED: 10.4 G/DL (ref 13.5–17.5)
HEMOGLOBIN, ART, EXTENDED: 10.8 G/DL (ref 13.5–17.5)
HEMOGLOBIN, ART, EXTENDED: 11 G/DL (ref 13.5–17.5)
HEMOGLOBIN, ART, EXTENDED: 11 G/DL (ref 13.5–17.5)
HEMOGLOBIN, ART, EXTENDED: 12.3 G/DL (ref 13.5–17.5)
HEMOGLOBIN, ART, EXTENDED: 12.3 G/DL (ref 13.5–17.5)
HEMOGLOBIN, ART, EXTENDED: 12.4 G/DL (ref 13.5–17.5)
HEMOGLOBIN, ART, EXTENDED: 12.5 G/DL (ref 13.5–17.5)
HEMOGLOBIN, ART, EXTENDED: 12.6 G/DL (ref 13.5–17.5)
HEMOGLOBIN, ART, EXTENDED: 12.9 G/DL (ref 13.5–17.5)
HEMOGLOBIN, ART, EXTENDED: 13 G/DL (ref 13.5–17.5)
HEMOGLOBIN, ART, EXTENDED: 13 G/DL (ref 13.5–17.5)
HEMOGLOBIN, ART, EXTENDED: 13.1 G/DL (ref 13.5–17.5)
HEMOGLOBIN, ART, EXTENDED: 13.3 G/DL (ref 13.5–17.5)
HEMOGLOBIN, ART, EXTENDED: 13.5 G/DL (ref 13.5–17.5)
HEMOGLOBIN, ART, EXTENDED: 13.5 G/DL (ref 13.5–17.5)
HEMOGLOBIN, ART, EXTENDED: 13.7 G/DL (ref 13.5–17.5)
HEMOGLOBIN, ART, EXTENDED: 14 G/DL (ref 13.5–17.5)
HEMOGLOBIN, ART, EXTENDED: 6.7 G/DL (ref 13.5–17.5)
HEMOGLOBIN, ART, EXTENDED: 7.2 G/DL (ref 13.5–17.5)
HEMOGLOBIN, ART, EXTENDED: 7.3 G/DL (ref 13.5–17.5)
HEMOGLOBIN, ART, EXTENDED: 7.3 G/DL (ref 13.5–17.5)
HEMOGLOBIN, ART, EXTENDED: 7.4 G/DL (ref 13.5–17.5)
HEMOGLOBIN, ART, EXTENDED: 7.5 G/DL (ref 13.5–17.5)
HEMOGLOBIN, ART, EXTENDED: 7.5 G/DL (ref 13.5–17.5)
HEMOGLOBIN, ART, EXTENDED: 7.7 G/DL (ref 13.5–17.5)
HEMOGLOBIN, ART, EXTENDED: 7.8 G/DL (ref 13.5–17.5)
HEMOGLOBIN, ART, EXTENDED: 7.9 G/DL (ref 13.5–17.5)
HEMOGLOBIN, ART, EXTENDED: 8.1 G/DL (ref 13.5–17.5)
HEMOGLOBIN, ART, EXTENDED: 8.1 G/DL (ref 13.5–17.5)
HEMOGLOBIN, ART, EXTENDED: 8.3 G/DL (ref 13.5–17.5)
HEMOGLOBIN, ART, EXTENDED: 8.3 G/DL (ref 13.5–17.5)
HEMOGLOBIN, ART, EXTENDED: 9.2 G/DL (ref 13.5–17.5)
HEMOGLOBIN, ART, EXTENDED: 9.4 G/DL (ref 13.5–17.5)
HEMOGLOBIN, ART, EXTENDED: 9.7 G/DL (ref 13.5–17.5)
HEMOGLOBIN, ART, EXTENDED: 9.7 G/DL (ref 13.5–17.5)
HEMOGLOBIN: 10.1 G/DL (ref 13.5–17.5)
HEMOGLOBIN: 10.3 G/DL (ref 13.5–17.5)
HEMOGLOBIN: 11.1 G/DL (ref 13.5–17.5)
HEMOGLOBIN: 11.1 G/DL (ref 13.5–17.5)
HEMOGLOBIN: 11.3 G/DL (ref 13.5–17.5)
HEMOGLOBIN: 11.3 G/DL (ref 13.5–17.5)
HEMOGLOBIN: 11.4 G/DL (ref 13.5–17.5)
HEMOGLOBIN: 11.4 G/DL (ref 13.5–17.5)
HEMOGLOBIN: 11.5 G/DL (ref 13.5–17.5)
HEMOGLOBIN: 11.8 G/DL (ref 13.5–17.5)
HEMOGLOBIN: 12.2 G/DL (ref 13.5–17.5)
HEMOGLOBIN: 12.2 G/DL (ref 13.5–17.5)
HEMOGLOBIN: 12.3 G/DL (ref 13.5–17.5)
HEMOGLOBIN: 12.3 G/DL (ref 13.5–17.5)
HEMOGLOBIN: 12.5 G/DL (ref 13.5–17.5)
HEMOGLOBIN: 12.5 G/DL (ref 13.5–17.5)
HEMOGLOBIN: 12.6 G/DL (ref 13.5–17.5)
HEMOGLOBIN: 5.5 G/DL (ref 13.5–17.5)
HEMOGLOBIN: 5.9 G/DL (ref 13.5–17.5)
HEMOGLOBIN: 6 G/DL (ref 13.5–17.5)
HEMOGLOBIN: 6.5 G/DL (ref 13.5–17.5)
HEMOGLOBIN: 6.6 G/DL (ref 13.5–17.5)
HEMOGLOBIN: 6.7 G/DL (ref 13.5–17.5)
HEMOGLOBIN: 6.8 G/DL (ref 13.5–17.5)
HEMOGLOBIN: 6.9 G/DL (ref 13.5–17.5)
HEMOGLOBIN: 6.9 G/DL (ref 13.5–17.5)
HEMOGLOBIN: 7 G/DL (ref 13.5–17.5)
HEMOGLOBIN: 7 G/DL (ref 13.5–17.5)
HEMOGLOBIN: 7.1 G/DL (ref 13.5–17.5)
HEMOGLOBIN: 7.2 G/DL (ref 13.5–17.5)
HEMOGLOBIN: 7.2 G/DL (ref 13.5–17.5)
HEMOGLOBIN: 7.3 G/DL (ref 13.5–17.5)
HEMOGLOBIN: 7.4 G/DL (ref 13.5–17.5)
HEMOGLOBIN: 7.5 G/DL (ref 13.5–17.5)
HEMOGLOBIN: 7.6 G/DL (ref 13.5–17.5)
HEMOGLOBIN: 7.7 G/DL (ref 13.5–17.5)
HEMOGLOBIN: 7.8 G/DL (ref 13.5–17.5)
HEMOGLOBIN: 7.9 G/DL (ref 13.5–17.5)
HEMOGLOBIN: 8 G/DL (ref 13.5–17.5)
HEMOGLOBIN: 8.1 G/DL (ref 13.5–17.5)
HEMOGLOBIN: 8.2 G/DL (ref 13.5–17.5)
HEMOGLOBIN: 8.3 G/DL (ref 13.5–17.5)
HEMOGLOBIN: 8.5 G/DL (ref 13.5–17.5)
HEMOGLOBIN: 8.6 G/DL (ref 13.5–17.5)
HEMOGLOBIN: 8.7 G/DL (ref 13.5–17.5)
HEMOGLOBIN: 8.7 G/DL (ref 13.5–17.5)
HEMOGLOBIN: 8.8 G/DL (ref 13.5–17.5)
HEMOGLOBIN: 9 G/DL (ref 13.5–17.5)
HEMOGLOBIN: 9.1 G/DL (ref 13.5–17.5)
HEMOGLOBIN: 9.2 G/DL (ref 13.5–17.5)
HEMOGLOBIN: 9.6 G/DL (ref 13.5–17.5)
HEMOGLOBIN: 9.6 G/DL (ref 13.5–17.5)
HEPATITIS B CORE TOTAL ANTIBODY: NEGATIVE
HEPATITIS B SURFACE ANTIGEN INTERPRETATION: NORMAL
HEPATITIS B SURFACE ANTIGEN INTERPRETATION: NORMAL
HEPATITIS BE ANTIGEN: NEGATIVE
HYPOCHROMIA: ABNORMAL
HYPOCHROMIA: ABNORMAL
INR BLD: 1.12 (ref 0.88–1.12)
INR BLD: 1.18 (ref 0.88–1.12)
INR BLD: 1.2 (ref 0.88–1.12)
INR BLD: 1.35 (ref 0.88–1.12)
INR BLD: 1.38 (ref 0.88–1.12)
INR BLD: 1.39 (ref 0.88–1.12)
INR BLD: 1.42 (ref 0.88–1.12)
INR BLD: 1.52 (ref 0.88–1.12)
INR BLD: 1.61 (ref 0.88–1.12)
INR BLD: 1.61 (ref 0.88–1.12)
KETONES, URINE: 15 MG/DL
KETONES, URINE: ABNORMAL MG/DL
KETONES, URINE: NEGATIVE MG/DL
LACTIC ACID: 1.1 MMOL/L (ref 0.4–2)
LACTIC ACID: 1.8 MMOL/L (ref 0.4–2)
LACTIC ACID: 1.8 MMOL/L (ref 0.4–2)
LACTIC ACID: 1.9 MMOL/L (ref 0.4–2)
LACTIC ACID: 2 MMOL/L (ref 0.4–2)
LACTIC ACID: 2.1 MMOL/L (ref 0.4–2)
LACTIC ACID: 2.4 MMOL/L (ref 0.4–2)
LACTIC ACID: 2.5 MMOL/L (ref 0.4–2)
LACTIC ACID: 2.6 MMOL/L (ref 0.4–2)
LACTIC ACID: 3.7 MMOL/L (ref 0.4–2)
LACTIC ACID: 3.8 MMOL/L (ref 0.4–2)
LEUKOCYTE ESTERASE, URINE: ABNORMAL
LEUKOCYTE ESTERASE, URINE: NEGATIVE
LEUKOCYTE ESTERASE, URINE: NEGATIVE
LIPASE: 128 U/L (ref 13–60)
LIPASE: 9 U/L (ref 13–60)
LV EF: 35 %
LVEF MODALITY: NORMAL
LYMPHOCYTES ABSOLUTE: 0.4 K/UL (ref 1–5.1)
LYMPHOCYTES ABSOLUTE: 0.6 K/UL (ref 1–5.1)
LYMPHOCYTES ABSOLUTE: 0.6 K/UL (ref 1–5.1)
LYMPHOCYTES ABSOLUTE: 0.7 K/UL (ref 1–5.1)
LYMPHOCYTES ABSOLUTE: 0.8 K/UL (ref 1–5.1)
LYMPHOCYTES ABSOLUTE: 0.9 K/UL (ref 1–5.1)
LYMPHOCYTES ABSOLUTE: 1 K/UL (ref 1–5.1)
LYMPHOCYTES ABSOLUTE: 1.1 K/UL (ref 1–5.1)
LYMPHOCYTES ABSOLUTE: 1.2 K/UL (ref 1–5.1)
LYMPHOCYTES ABSOLUTE: 1.3 K/UL (ref 1–5.1)
LYMPHOCYTES ABSOLUTE: 1.4 K/UL (ref 1–5.1)
LYMPHOCYTES ABSOLUTE: 1.5 K/UL (ref 1–5.1)
LYMPHOCYTES ABSOLUTE: 1.6 K/UL (ref 1–5.1)
LYMPHOCYTES ABSOLUTE: 1.6 K/UL (ref 1–5.1)
LYMPHOCYTES ABSOLUTE: 1.7 K/UL (ref 1–5.1)
LYMPHOCYTES ABSOLUTE: 1.7 K/UL (ref 1–5.1)
LYMPHOCYTES ABSOLUTE: 1.8 K/UL (ref 1–5.1)
LYMPHOCYTES ABSOLUTE: 1.9 K/UL (ref 1–5.1)
LYMPHOCYTES ABSOLUTE: 2 K/UL (ref 1–5.1)
LYMPHOCYTES ABSOLUTE: 2.1 K/UL (ref 1–5.1)
LYMPHOCYTES ABSOLUTE: 2.3 K/UL (ref 1–5.1)
LYMPHOCYTES ABSOLUTE: 2.6 K/UL (ref 1–5.1)
LYMPHOCYTES ABSOLUTE: 2.6 K/UL (ref 1–5.1)
LYMPHOCYTES ABSOLUTE: 2.7 K/UL (ref 1–5.1)
LYMPHOCYTES ABSOLUTE: 2.7 K/UL (ref 1–5.1)
LYMPHOCYTES ABSOLUTE: 2.8 K/UL (ref 1–5.1)
LYMPHOCYTES ABSOLUTE: 2.9 K/UL (ref 1–5.1)
LYMPHOCYTES ABSOLUTE: 3.2 K/UL (ref 1–5.1)
LYMPHOCYTES ABSOLUTE: 3.4 K/UL (ref 1–5.1)
LYMPHOCYTES RELATIVE PERCENT: 10 %
LYMPHOCYTES RELATIVE PERCENT: 10.2 %
LYMPHOCYTES RELATIVE PERCENT: 10.4 %
LYMPHOCYTES RELATIVE PERCENT: 10.7 %
LYMPHOCYTES RELATIVE PERCENT: 10.7 %
LYMPHOCYTES RELATIVE PERCENT: 10.8 %
LYMPHOCYTES RELATIVE PERCENT: 10.9 %
LYMPHOCYTES RELATIVE PERCENT: 11 %
LYMPHOCYTES RELATIVE PERCENT: 11.2 %
LYMPHOCYTES RELATIVE PERCENT: 11.2 %
LYMPHOCYTES RELATIVE PERCENT: 11.4 %
LYMPHOCYTES RELATIVE PERCENT: 11.4 %
LYMPHOCYTES RELATIVE PERCENT: 11.5 %
LYMPHOCYTES RELATIVE PERCENT: 11.8 %
LYMPHOCYTES RELATIVE PERCENT: 12 %
LYMPHOCYTES RELATIVE PERCENT: 12 %
LYMPHOCYTES RELATIVE PERCENT: 12.2 %
LYMPHOCYTES RELATIVE PERCENT: 12.4 %
LYMPHOCYTES RELATIVE PERCENT: 12.4 %
LYMPHOCYTES RELATIVE PERCENT: 12.5 %
LYMPHOCYTES RELATIVE PERCENT: 12.8 %
LYMPHOCYTES RELATIVE PERCENT: 12.9 %
LYMPHOCYTES RELATIVE PERCENT: 13.9 %
LYMPHOCYTES RELATIVE PERCENT: 16.5 %
LYMPHOCYTES RELATIVE PERCENT: 18.2 %
LYMPHOCYTES RELATIVE PERCENT: 19.2 %
LYMPHOCYTES RELATIVE PERCENT: 2.8 %
LYMPHOCYTES RELATIVE PERCENT: 20.2 %
LYMPHOCYTES RELATIVE PERCENT: 22 %
LYMPHOCYTES RELATIVE PERCENT: 22.8 %
LYMPHOCYTES RELATIVE PERCENT: 3 %
LYMPHOCYTES RELATIVE PERCENT: 5.8 %
LYMPHOCYTES RELATIVE PERCENT: 50.6 %
LYMPHOCYTES RELATIVE PERCENT: 6 %
LYMPHOCYTES RELATIVE PERCENT: 7 %
LYMPHOCYTES RELATIVE PERCENT: 8 %
LYMPHOCYTES RELATIVE PERCENT: 8.4 %
LYMPHOCYTES RELATIVE PERCENT: 8.8 %
LYMPHOCYTES RELATIVE PERCENT: 9 %
LYMPHOCYTES RELATIVE PERCENT: 9.5 %
LYMPHOCYTES RELATIVE PERCENT: 9.6 %
LYMPHOCYTES RELATIVE PERCENT: 9.8 %
LYMPHOCYTES, BAL: 1 % (ref 5–10)
LYMPHOCYTES, BAL: 4 % (ref 5–10)
MACROPHAGES, BAL: 10 % (ref 90–95)
MACROPHAGES, BAL: 42 % (ref 90–95)
MAGNESIUM: 1.4 MG/DL (ref 1.8–2.4)
MAGNESIUM: 2.2 MG/DL (ref 1.8–2.4)
MAGNESIUM: 2.2 MG/DL (ref 1.8–2.4)
MAGNESIUM: 2.3 MG/DL (ref 1.8–2.4)
MAGNESIUM: 2.4 MG/DL (ref 1.8–2.4)
MAGNESIUM: 2.5 MG/DL (ref 1.8–2.4)
MAGNESIUM: 2.6 MG/DL (ref 1.8–2.4)
MAGNESIUM: 2.6 MG/DL (ref 1.8–2.4)
MAGNESIUM: 2.7 MG/DL (ref 1.8–2.4)
MAGNESIUM: 2.8 MG/DL (ref 1.8–2.4)
MCH RBC QN AUTO: 29.6 PG (ref 26–34)
MCH RBC QN AUTO: 29.8 PG (ref 26–34)
MCH RBC QN AUTO: 29.8 PG (ref 26–34)
MCH RBC QN AUTO: 29.9 PG (ref 26–34)
MCH RBC QN AUTO: 30 PG (ref 26–34)
MCH RBC QN AUTO: 30.1 PG (ref 26–34)
MCH RBC QN AUTO: 30.2 PG (ref 26–34)
MCH RBC QN AUTO: 30.3 PG (ref 26–34)
MCH RBC QN AUTO: 30.4 PG (ref 26–34)
MCH RBC QN AUTO: 30.4 PG (ref 26–34)
MCH RBC QN AUTO: 30.5 PG (ref 26–34)
MCH RBC QN AUTO: 30.6 PG (ref 26–34)
MCH RBC QN AUTO: 30.7 PG (ref 26–34)
MCH RBC QN AUTO: 30.8 PG (ref 26–34)
MCH RBC QN AUTO: 30.9 PG (ref 26–34)
MCH RBC QN AUTO: 31 PG (ref 26–34)
MCH RBC QN AUTO: 31.1 PG (ref 26–34)
MCH RBC QN AUTO: 31.1 PG (ref 26–34)
MCH RBC QN AUTO: 31.2 PG (ref 26–34)
MCH RBC QN AUTO: 31.3 PG (ref 26–34)
MCH RBC QN AUTO: 31.4 PG (ref 26–34)
MCH RBC QN AUTO: 31.4 PG (ref 26–34)
MCH RBC QN AUTO: 31.5 PG (ref 26–34)
MCH RBC QN AUTO: 31.6 PG (ref 26–34)
MCH RBC QN AUTO: 31.6 PG (ref 26–34)
MCH RBC QN AUTO: 31.7 PG (ref 26–34)
MCH RBC QN AUTO: 31.9 PG (ref 26–34)
MCH RBC QN AUTO: 31.9 PG (ref 26–34)
MCH RBC QN AUTO: 32 PG (ref 26–34)
MCH RBC QN AUTO: 32.1 PG (ref 26–34)
MCH RBC QN AUTO: 32.2 PG (ref 26–34)
MCH RBC QN AUTO: 32.4 PG (ref 26–34)
MCH RBC QN AUTO: 32.6 PG (ref 26–34)
MCH RBC QN AUTO: 32.6 PG (ref 26–34)
MCH RBC QN AUTO: 32.7 PG (ref 26–34)
MCH RBC QN AUTO: 32.7 PG (ref 26–34)
MCH RBC QN AUTO: 32.8 PG (ref 26–34)
MCH RBC QN AUTO: 33 PG (ref 26–34)
MCHC RBC AUTO-ENTMCNC: 32.4 G/DL (ref 31–36)
MCHC RBC AUTO-ENTMCNC: 32.5 G/DL (ref 31–36)
MCHC RBC AUTO-ENTMCNC: 32.5 G/DL (ref 31–36)
MCHC RBC AUTO-ENTMCNC: 32.6 G/DL (ref 31–36)
MCHC RBC AUTO-ENTMCNC: 32.7 G/DL (ref 31–36)
MCHC RBC AUTO-ENTMCNC: 32.8 G/DL (ref 31–36)
MCHC RBC AUTO-ENTMCNC: 32.9 G/DL (ref 31–36)
MCHC RBC AUTO-ENTMCNC: 33 G/DL (ref 31–36)
MCHC RBC AUTO-ENTMCNC: 33.2 G/DL (ref 31–36)
MCHC RBC AUTO-ENTMCNC: 33.3 G/DL (ref 31–36)
MCHC RBC AUTO-ENTMCNC: 33.3 G/DL (ref 31–36)
MCHC RBC AUTO-ENTMCNC: 33.5 G/DL (ref 31–36)
MCHC RBC AUTO-ENTMCNC: 33.6 G/DL (ref 31–36)
MCHC RBC AUTO-ENTMCNC: 33.7 G/DL (ref 31–36)
MCHC RBC AUTO-ENTMCNC: 33.8 G/DL (ref 31–36)
MCHC RBC AUTO-ENTMCNC: 33.9 G/DL (ref 31–36)
MCHC RBC AUTO-ENTMCNC: 34 G/DL (ref 31–36)
MCHC RBC AUTO-ENTMCNC: 34.1 G/DL (ref 31–36)
MCHC RBC AUTO-ENTMCNC: 34.1 G/DL (ref 31–36)
MCHC RBC AUTO-ENTMCNC: 34.2 G/DL (ref 31–36)
MCHC RBC AUTO-ENTMCNC: 34.3 G/DL (ref 31–36)
MCHC RBC AUTO-ENTMCNC: 34.4 G/DL (ref 31–36)
MCHC RBC AUTO-ENTMCNC: 34.7 G/DL (ref 31–36)
MCHC RBC AUTO-ENTMCNC: 34.8 G/DL (ref 31–36)
MCHC RBC AUTO-ENTMCNC: 34.9 G/DL (ref 31–36)
MCHC RBC AUTO-ENTMCNC: 34.9 G/DL (ref 31–36)
MCHC RBC AUTO-ENTMCNC: 35 G/DL (ref 31–36)
MCHC RBC AUTO-ENTMCNC: 35.1 G/DL (ref 31–36)
MCHC RBC AUTO-ENTMCNC: 35.2 G/DL (ref 31–36)
MCHC RBC AUTO-ENTMCNC: 35.3 G/DL (ref 31–36)
MCHC RBC AUTO-ENTMCNC: 35.4 G/DL (ref 31–36)
MCHC RBC AUTO-ENTMCNC: 35.5 G/DL (ref 31–36)
MCV RBC AUTO: 87.7 FL (ref 80–100)
MCV RBC AUTO: 88.5 FL (ref 80–100)
MCV RBC AUTO: 88.8 FL (ref 80–100)
MCV RBC AUTO: 89 FL (ref 80–100)
MCV RBC AUTO: 89.1 FL (ref 80–100)
MCV RBC AUTO: 89.2 FL (ref 80–100)
MCV RBC AUTO: 89.3 FL (ref 80–100)
MCV RBC AUTO: 89.4 FL (ref 80–100)
MCV RBC AUTO: 89.4 FL (ref 80–100)
MCV RBC AUTO: 89.5 FL (ref 80–100)
MCV RBC AUTO: 89.6 FL (ref 80–100)
MCV RBC AUTO: 89.7 FL (ref 80–100)
MCV RBC AUTO: 89.8 FL (ref 80–100)
MCV RBC AUTO: 89.8 FL (ref 80–100)
MCV RBC AUTO: 89.9 FL (ref 80–100)
MCV RBC AUTO: 90.1 FL (ref 80–100)
MCV RBC AUTO: 90.1 FL (ref 80–100)
MCV RBC AUTO: 90.3 FL (ref 80–100)
MCV RBC AUTO: 90.4 FL (ref 80–100)
MCV RBC AUTO: 90.5 FL (ref 80–100)
MCV RBC AUTO: 90.7 FL (ref 80–100)
MCV RBC AUTO: 90.8 FL (ref 80–100)
MCV RBC AUTO: 90.9 FL (ref 80–100)
MCV RBC AUTO: 91 FL (ref 80–100)
MCV RBC AUTO: 91 FL (ref 80–100)
MCV RBC AUTO: 91.1 FL (ref 80–100)
MCV RBC AUTO: 91.1 FL (ref 80–100)
MCV RBC AUTO: 91.2 FL (ref 80–100)
MCV RBC AUTO: 91.2 FL (ref 80–100)
MCV RBC AUTO: 91.3 FL (ref 80–100)
MCV RBC AUTO: 91.3 FL (ref 80–100)
MCV RBC AUTO: 91.4 FL (ref 80–100)
MCV RBC AUTO: 91.5 FL (ref 80–100)
MCV RBC AUTO: 91.6 FL (ref 80–100)
MCV RBC AUTO: 91.7 FL (ref 80–100)
MCV RBC AUTO: 91.9 FL (ref 80–100)
MCV RBC AUTO: 92 FL (ref 80–100)
MCV RBC AUTO: 92.3 FL (ref 80–100)
MCV RBC AUTO: 92.5 FL (ref 80–100)
MCV RBC AUTO: 92.7 FL (ref 80–100)
MCV RBC AUTO: 92.8 FL (ref 80–100)
MCV RBC AUTO: 92.8 FL (ref 80–100)
MCV RBC AUTO: 92.9 FL (ref 80–100)
MCV RBC AUTO: 92.9 FL (ref 80–100)
MCV RBC AUTO: 93 FL (ref 80–100)
MCV RBC AUTO: 93.1 FL (ref 80–100)
MCV RBC AUTO: 93.1 FL (ref 80–100)
MCV RBC AUTO: 93.2 FL (ref 80–100)
MCV RBC AUTO: 93.5 FL (ref 80–100)
MCV RBC AUTO: 93.7 FL (ref 80–100)
MCV RBC AUTO: 93.7 FL (ref 80–100)
MCV RBC AUTO: 94 FL (ref 80–100)
MCV RBC AUTO: 94.4 FL (ref 80–100)
MCV RBC AUTO: 94.9 FL (ref 80–100)
MCV RBC AUTO: 95.2 FL (ref 80–100)
MCV RBC AUTO: 95.8 FL (ref 80–100)
METAMYELOCYTES RELATIVE PERCENT: 1 %
METAMYELOCYTES RELATIVE PERCENT: 2 %
METAMYELOCYTES RELATIVE PERCENT: 8 %
METHEMOGLOBIN ARTERIAL: 0 %
METHEMOGLOBIN ARTERIAL: 0.1 %
METHEMOGLOBIN ARTERIAL: 0.2 %
METHEMOGLOBIN ARTERIAL: 0.3 %
METHEMOGLOBIN ARTERIAL: 0.5 %
METHEMOGLOBIN ARTERIAL: 0.7 %
METHEMOGLOBIN VENOUS: 0.3 %
METHEMOGLOBIN VENOUS: 0.6 %
MICROSCOPIC EXAMINATION: YES
MONOCYTES ABSOLUTE: 0.1 K/UL (ref 0–1.3)
MONOCYTES ABSOLUTE: 0.1 K/UL (ref 0–1.3)
MONOCYTES ABSOLUTE: 0.3 K/UL (ref 0–1.3)
MONOCYTES ABSOLUTE: 0.4 K/UL (ref 0–1.3)
MONOCYTES ABSOLUTE: 0.5 K/UL (ref 0–1.3)
MONOCYTES ABSOLUTE: 0.5 K/UL (ref 0–1.3)
MONOCYTES ABSOLUTE: 0.6 K/UL (ref 0–1.3)
MONOCYTES ABSOLUTE: 0.7 K/UL (ref 0–1.3)
MONOCYTES ABSOLUTE: 0.8 K/UL (ref 0–1.3)
MONOCYTES ABSOLUTE: 0.9 K/UL (ref 0–1.3)
MONOCYTES ABSOLUTE: 1 K/UL (ref 0–1.3)
MONOCYTES ABSOLUTE: 1 K/UL (ref 0–1.3)
MONOCYTES ABSOLUTE: 1.1 K/UL (ref 0–1.3)
MONOCYTES ABSOLUTE: 1.2 K/UL (ref 0–1.3)
MONOCYTES ABSOLUTE: 1.3 K/UL (ref 0–1.3)
MONOCYTES ABSOLUTE: 1.4 K/UL (ref 0–1.3)
MONOCYTES ABSOLUTE: 1.6 K/UL (ref 0–1.3)
MONOCYTES ABSOLUTE: 1.7 K/UL (ref 0–1.3)
MONOCYTES ABSOLUTE: 1.7 K/UL (ref 0–1.3)
MONOCYTES RELATIVE PERCENT: 1 %
MONOCYTES RELATIVE PERCENT: 1.6 %
MONOCYTES RELATIVE PERCENT: 10.1 %
MONOCYTES RELATIVE PERCENT: 10.6 %
MONOCYTES RELATIVE PERCENT: 10.7 %
MONOCYTES RELATIVE PERCENT: 10.8 %
MONOCYTES RELATIVE PERCENT: 11.2 %
MONOCYTES RELATIVE PERCENT: 13.8 %
MONOCYTES RELATIVE PERCENT: 2 %
MONOCYTES RELATIVE PERCENT: 3 %
MONOCYTES RELATIVE PERCENT: 3.7 %
MONOCYTES RELATIVE PERCENT: 4 %
MONOCYTES RELATIVE PERCENT: 4.2 %
MONOCYTES RELATIVE PERCENT: 4.7 %
MONOCYTES RELATIVE PERCENT: 4.9 %
MONOCYTES RELATIVE PERCENT: 5 %
MONOCYTES RELATIVE PERCENT: 5.2 %
MONOCYTES RELATIVE PERCENT: 5.8 %
MONOCYTES RELATIVE PERCENT: 5.8 %
MONOCYTES RELATIVE PERCENT: 6 %
MONOCYTES RELATIVE PERCENT: 6.3 %
MONOCYTES RELATIVE PERCENT: 6.4 %
MONOCYTES RELATIVE PERCENT: 6.5 %
MONOCYTES RELATIVE PERCENT: 6.9 %
MONOCYTES RELATIVE PERCENT: 7.1 %
MONOCYTES RELATIVE PERCENT: 7.1 %
MONOCYTES RELATIVE PERCENT: 7.2 %
MONOCYTES RELATIVE PERCENT: 7.8 %
MONOCYTES RELATIVE PERCENT: 8.5 %
MONOCYTES RELATIVE PERCENT: 8.5 %
MONOCYTES RELATIVE PERCENT: 8.7 %
MONOCYTES RELATIVE PERCENT: 8.7 %
MONOCYTES RELATIVE PERCENT: 8.9 %
MONOCYTES RELATIVE PERCENT: 9 %
MONOCYTES RELATIVE PERCENT: 9.2 %
MONOCYTES RELATIVE PERCENT: 9.4 %
MONOCYTES RELATIVE PERCENT: 9.5 %
MONOCYTES RELATIVE PERCENT: 9.6 %
MONOCYTES, BAL: 2 %
MUCUS: ABNORMAL /LPF
MYELOCYTE PERCENT: 1 %
MYELOCYTE PERCENT: 3 %
MYELOCYTE PERCENT: 3 %
MYELOCYTE PERCENT: 4 %
MYELOCYTE PERCENT: 4 %
MYELOCYTE PERCENT: 5 %
MYELOCYTE PERCENT: 5 %
NEUTROPHILS ABSOLUTE: 10.4 K/UL (ref 1.7–7.7)
NEUTROPHILS ABSOLUTE: 10.6 K/UL (ref 1.7–7.7)
NEUTROPHILS ABSOLUTE: 10.8 K/UL (ref 1.7–7.7)
NEUTROPHILS ABSOLUTE: 11.4 K/UL (ref 1.7–7.7)
NEUTROPHILS ABSOLUTE: 11.7 K/UL (ref 1.7–7.7)
NEUTROPHILS ABSOLUTE: 12.1 K/UL (ref 1.7–7.7)
NEUTROPHILS ABSOLUTE: 12.6 K/UL (ref 1.7–7.7)
NEUTROPHILS ABSOLUTE: 14.1 K/UL (ref 1.7–7.7)
NEUTROPHILS ABSOLUTE: 14.2 K/UL (ref 1.7–7.7)
NEUTROPHILS ABSOLUTE: 15.6 K/UL (ref 1.7–7.7)
NEUTROPHILS ABSOLUTE: 16 K/UL (ref 1.7–7.7)
NEUTROPHILS ABSOLUTE: 16.9 K/UL (ref 1.7–7.7)
NEUTROPHILS ABSOLUTE: 17 K/UL (ref 1.7–7.7)
NEUTROPHILS ABSOLUTE: 17.7 K/UL (ref 1.7–7.7)
NEUTROPHILS ABSOLUTE: 17.9 K/UL (ref 1.7–7.7)
NEUTROPHILS ABSOLUTE: 19.8 K/UL (ref 1.7–7.7)
NEUTROPHILS ABSOLUTE: 2.1 K/UL (ref 1.7–7.7)
NEUTROPHILS ABSOLUTE: 2.7 K/UL (ref 1.7–7.7)
NEUTROPHILS ABSOLUTE: 2.7 K/UL (ref 1.7–7.7)
NEUTROPHILS ABSOLUTE: 20.1 K/UL (ref 1.7–7.7)
NEUTROPHILS ABSOLUTE: 22.6 K/UL (ref 1.7–7.7)
NEUTROPHILS ABSOLUTE: 24.3 K/UL (ref 1.7–7.7)
NEUTROPHILS ABSOLUTE: 24.4 K/UL (ref 1.7–7.7)
NEUTROPHILS ABSOLUTE: 26.7 K/UL (ref 1.7–7.7)
NEUTROPHILS ABSOLUTE: 27.2 K/UL (ref 1.7–7.7)
NEUTROPHILS ABSOLUTE: 28.3 K/UL (ref 1.7–7.7)
NEUTROPHILS ABSOLUTE: 3.5 K/UL (ref 1.7–7.7)
NEUTROPHILS ABSOLUTE: 3.5 K/UL (ref 1.7–7.7)
NEUTROPHILS ABSOLUTE: 30.1 K/UL (ref 1.7–7.7)
NEUTROPHILS ABSOLUTE: 4.4 K/UL (ref 1.7–7.7)
NEUTROPHILS ABSOLUTE: 4.6 K/UL (ref 1.7–7.7)
NEUTROPHILS ABSOLUTE: 5.2 K/UL (ref 1.7–7.7)
NEUTROPHILS ABSOLUTE: 5.9 K/UL (ref 1.7–7.7)
NEUTROPHILS ABSOLUTE: 6 K/UL (ref 1.7–7.7)
NEUTROPHILS ABSOLUTE: 6.1 K/UL (ref 1.7–7.7)
NEUTROPHILS ABSOLUTE: 6.2 K/UL (ref 1.7–7.7)
NEUTROPHILS ABSOLUTE: 6.6 K/UL (ref 1.7–7.7)
NEUTROPHILS ABSOLUTE: 6.7 K/UL (ref 1.7–7.7)
NEUTROPHILS ABSOLUTE: 6.8 K/UL (ref 1.7–7.7)
NEUTROPHILS ABSOLUTE: 7.3 K/UL (ref 1.7–7.7)
NEUTROPHILS ABSOLUTE: 7.4 K/UL (ref 1.7–7.7)
NEUTROPHILS ABSOLUTE: 7.5 K/UL (ref 1.7–7.7)
NEUTROPHILS ABSOLUTE: 7.5 K/UL (ref 1.7–7.7)
NEUTROPHILS ABSOLUTE: 7.6 K/UL (ref 1.7–7.7)
NEUTROPHILS ABSOLUTE: 8.2 K/UL (ref 1.7–7.7)
NEUTROPHILS ABSOLUTE: 8.4 K/UL (ref 1.7–7.7)
NEUTROPHILS ABSOLUTE: 8.9 K/UL (ref 1.7–7.7)
NEUTROPHILS ABSOLUTE: 9.2 K/UL (ref 1.7–7.7)
NEUTROPHILS ABSOLUTE: 9.3 K/UL (ref 1.7–7.7)
NEUTROPHILS ABSOLUTE: 9.3 K/UL (ref 1.7–7.7)
NEUTROPHILS ABSOLUTE: 9.4 K/UL (ref 1.7–7.7)
NEUTROPHILS ABSOLUTE: 9.5 K/UL (ref 1.7–7.7)
NEUTROPHILS ABSOLUTE: 9.8 K/UL (ref 1.7–7.7)
NEUTROPHILS RELATIVE PERCENT: 38.9 %
NEUTROPHILS RELATIVE PERCENT: 61.5 %
NEUTROPHILS RELATIVE PERCENT: 63.6 %
NEUTROPHILS RELATIVE PERCENT: 64.6 %
NEUTROPHILS RELATIVE PERCENT: 64.8 %
NEUTROPHILS RELATIVE PERCENT: 68.6 %
NEUTROPHILS RELATIVE PERCENT: 69 %
NEUTROPHILS RELATIVE PERCENT: 72.2 %
NEUTROPHILS RELATIVE PERCENT: 72.3 %
NEUTROPHILS RELATIVE PERCENT: 73.5 %
NEUTROPHILS RELATIVE PERCENT: 73.6 %
NEUTROPHILS RELATIVE PERCENT: 74 %
NEUTROPHILS RELATIVE PERCENT: 74.4 %
NEUTROPHILS RELATIVE PERCENT: 74.7 %
NEUTROPHILS RELATIVE PERCENT: 74.8 %
NEUTROPHILS RELATIVE PERCENT: 75 %
NEUTROPHILS RELATIVE PERCENT: 75 %
NEUTROPHILS RELATIVE PERCENT: 75.8 %
NEUTROPHILS RELATIVE PERCENT: 76 %
NEUTROPHILS RELATIVE PERCENT: 76 %
NEUTROPHILS RELATIVE PERCENT: 76.2 %
NEUTROPHILS RELATIVE PERCENT: 76.7 %
NEUTROPHILS RELATIVE PERCENT: 77 %
NEUTROPHILS RELATIVE PERCENT: 77.4 %
NEUTROPHILS RELATIVE PERCENT: 77.5 %
NEUTROPHILS RELATIVE PERCENT: 77.8 %
NEUTROPHILS RELATIVE PERCENT: 78.1 %
NEUTROPHILS RELATIVE PERCENT: 78.5 %
NEUTROPHILS RELATIVE PERCENT: 79 %
NEUTROPHILS RELATIVE PERCENT: 79.1 %
NEUTROPHILS RELATIVE PERCENT: 79.7 %
NEUTROPHILS RELATIVE PERCENT: 80 %
NEUTROPHILS RELATIVE PERCENT: 80 %
NEUTROPHILS RELATIVE PERCENT: 80.7 %
NEUTROPHILS RELATIVE PERCENT: 80.8 %
NEUTROPHILS RELATIVE PERCENT: 80.9 %
NEUTROPHILS RELATIVE PERCENT: 81 %
NEUTROPHILS RELATIVE PERCENT: 81 %
NEUTROPHILS RELATIVE PERCENT: 81.4 %
NEUTROPHILS RELATIVE PERCENT: 81.5 %
NEUTROPHILS RELATIVE PERCENT: 82 %
NEUTROPHILS RELATIVE PERCENT: 83 %
NEUTROPHILS RELATIVE PERCENT: 83 %
NEUTROPHILS RELATIVE PERCENT: 83.2 %
NEUTROPHILS RELATIVE PERCENT: 84 %
NEUTROPHILS RELATIVE PERCENT: 84 %
NEUTROPHILS RELATIVE PERCENT: 84.5 %
NEUTROPHILS RELATIVE PERCENT: 85 %
NEUTROPHILS RELATIVE PERCENT: 86 %
NEUTROPHILS RELATIVE PERCENT: 86 %
NEUTROPHILS RELATIVE PERCENT: 87 %
NEUTROPHILS RELATIVE PERCENT: 87 %
NEUTROPHILS RELATIVE PERCENT: 89.9 %
NEUTROPHILS RELATIVE PERCENT: 90 %
NEUTROPHILS RELATIVE PERCENT: 94.8 %
NITRITE, URINE: NEGATIVE
NUCLEATED RED BLOOD CELLS: 1 /100 WBC
NUCLEATED RED BLOOD CELLS: 1 /100 WBC
NUMBER OF CELLS COUNTED BAL (LAVAGE): 100
NUMBER OF CELLS COUNTED BAL (LAVAGE): 100
O2 CONTENT, VEN: 9 VOL %
O2 SAT, ARTERIAL: 83.4 %
O2 SAT, ARTERIAL: 91.8 %
O2 SAT, ARTERIAL: 92.2 %
O2 SAT, ARTERIAL: 92.3 %
O2 SAT, ARTERIAL: 92.5 %
O2 SAT, ARTERIAL: 92.6 %
O2 SAT, ARTERIAL: 92.7 %
O2 SAT, ARTERIAL: 92.9 %
O2 SAT, ARTERIAL: 93.2 %
O2 SAT, ARTERIAL: 93.3 %
O2 SAT, ARTERIAL: 94.1 %
O2 SAT, ARTERIAL: 94.1 %
O2 SAT, ARTERIAL: 94.3 %
O2 SAT, ARTERIAL: 94.3 %
O2 SAT, ARTERIAL: 94.5 %
O2 SAT, ARTERIAL: 94.6 %
O2 SAT, ARTERIAL: 94.7 %
O2 SAT, ARTERIAL: 95.1 %
O2 SAT, ARTERIAL: 95.2 %
O2 SAT, ARTERIAL: 95.2 %
O2 SAT, ARTERIAL: 95.3 %
O2 SAT, ARTERIAL: 95.3 %
O2 SAT, ARTERIAL: 95.4 %
O2 SAT, ARTERIAL: 95.7 %
O2 SAT, ARTERIAL: 95.8 %
O2 SAT, ARTERIAL: 95.9 %
O2 SAT, ARTERIAL: 96.1 %
O2 SAT, ARTERIAL: 96.1 %
O2 SAT, ARTERIAL: 96.6 %
O2 SAT, ARTERIAL: 96.8 %
O2 SAT, ARTERIAL: 96.9 %
O2 SAT, ARTERIAL: 97.1 %
O2 SAT, ARTERIAL: 97.2 %
O2 SAT, ARTERIAL: 97.6 %
O2 SAT, ARTERIAL: 98 %
O2 SAT, ARTERIAL: 98.2 %
O2 SAT, ARTERIAL: 98.7 %
O2 SAT, VEN: 53 %
O2 SAT, VEN: 76 %
O2 SAT, VEN: 97 %
O2 SAT, VEN: 98 %
O2 THERAPY: ABNORMAL
OCCULT BLOOD DIAGNOSTIC: ABNORMAL
ORGANISM: ABNORMAL
OVALOCYTES: ABNORMAL
PCO2 ARTERIAL: 22.7 MMHG (ref 35–45)
PCO2 ARTERIAL: 23 MMHG (ref 35–45)
PCO2 ARTERIAL: 25.1 MMHG (ref 35–45)
PCO2 ARTERIAL: 25.1 MMHG (ref 35–45)
PCO2 ARTERIAL: 26.5 MMHG (ref 35–45)
PCO2 ARTERIAL: 26.9 MMHG (ref 35–45)
PCO2 ARTERIAL: 27.3 MMHG (ref 35–45)
PCO2 ARTERIAL: 27.5 MMHG (ref 35–45)
PCO2 ARTERIAL: 27.6 MMHG (ref 35–45)
PCO2 ARTERIAL: 28.1 MMHG (ref 35–45)
PCO2 ARTERIAL: 28.1 MMHG (ref 35–45)
PCO2 ARTERIAL: 28.3 MMHG (ref 35–45)
PCO2 ARTERIAL: 28.9 MMHG (ref 35–45)
PCO2 ARTERIAL: 29.3 MMHG (ref 35–45)
PCO2 ARTERIAL: 29.5 MMHG (ref 35–45)
PCO2 ARTERIAL: 29.6 MMHG (ref 35–45)
PCO2 ARTERIAL: 29.6 MMHG (ref 35–45)
PCO2 ARTERIAL: 29.7 MMHG (ref 35–45)
PCO2 ARTERIAL: 30.5 MMHG (ref 35–45)
PCO2 ARTERIAL: 30.7 MMHG (ref 35–45)
PCO2 ARTERIAL: 30.9 MMHG (ref 35–45)
PCO2 ARTERIAL: 31.1 MMHG (ref 35–45)
PCO2 ARTERIAL: 31.2 MMHG (ref 35–45)
PCO2 ARTERIAL: 31.2 MMHG (ref 35–45)
PCO2 ARTERIAL: 31.5 MMHG (ref 35–45)
PCO2 ARTERIAL: 31.7 MMHG (ref 35–45)
PCO2 ARTERIAL: 32.1 MMHG (ref 35–45)
PCO2 ARTERIAL: 32.5 MMHG (ref 35–45)
PCO2 ARTERIAL: 33.2 MMHG (ref 35–45)
PCO2 ARTERIAL: 34.2 MMHG (ref 35–45)
PCO2 ARTERIAL: 34.2 MMHG (ref 35–45)
PCO2 ARTERIAL: 34.3 MMHG (ref 35–45)
PCO2 ARTERIAL: 34.4 MMHG (ref 35–45)
PCO2 ARTERIAL: 35.9 MMHG (ref 35–45)
PCO2 ARTERIAL: 36.1 MMHG (ref 35–45)
PCO2 ARTERIAL: 38.6 MMHG (ref 35–45)
PCO2 ARTERIAL: 43.7 MMHG (ref 35–45)
PCO2, VEN: 30.5 MMHG (ref 40–50)
PCO2, VEN: 32.3 MMHG (ref 40–50)
PCO2, VEN: 39.2 MMHG (ref 40–50)
PCO2, VEN: 56.1 MMHG (ref 40–50)
PDW BLD-RTO: 13.5 % (ref 12.4–15.4)
PDW BLD-RTO: 13.5 % (ref 12.4–15.4)
PDW BLD-RTO: 13.6 % (ref 12.4–15.4)
PDW BLD-RTO: 13.6 % (ref 12.4–15.4)
PDW BLD-RTO: 13.7 % (ref 12.4–15.4)
PDW BLD-RTO: 13.7 % (ref 12.4–15.4)
PDW BLD-RTO: 13.8 % (ref 12.4–15.4)
PDW BLD-RTO: 13.9 % (ref 12.4–15.4)
PDW BLD-RTO: 14 % (ref 12.4–15.4)
PDW BLD-RTO: 14.1 % (ref 12.4–15.4)
PDW BLD-RTO: 14.2 % (ref 12.4–15.4)
PDW BLD-RTO: 14.2 % (ref 12.4–15.4)
PDW BLD-RTO: 14.3 % (ref 12.4–15.4)
PDW BLD-RTO: 14.4 % (ref 12.4–15.4)
PDW BLD-RTO: 14.4 % (ref 12.4–15.4)
PDW BLD-RTO: 14.5 % (ref 12.4–15.4)
PDW BLD-RTO: 14.5 % (ref 12.4–15.4)
PDW BLD-RTO: 14.6 % (ref 12.4–15.4)
PDW BLD-RTO: 14.7 % (ref 12.4–15.4)
PDW BLD-RTO: 14.9 % (ref 12.4–15.4)
PDW BLD-RTO: 14.9 % (ref 12.4–15.4)
PDW BLD-RTO: 15 % (ref 12.4–15.4)
PDW BLD-RTO: 15.1 % (ref 12.4–15.4)
PDW BLD-RTO: 15.2 % (ref 12.4–15.4)
PDW BLD-RTO: 15.2 % (ref 12.4–15.4)
PDW BLD-RTO: 15.3 % (ref 12.4–15.4)
PDW BLD-RTO: 15.4 % (ref 12.4–15.4)
PDW BLD-RTO: 15.4 % (ref 12.4–15.4)
PDW BLD-RTO: 15.5 % (ref 12.4–15.4)
PDW BLD-RTO: 15.5 % (ref 12.4–15.4)
PDW BLD-RTO: 15.7 % (ref 12.4–15.4)
PDW BLD-RTO: 15.7 % (ref 12.4–15.4)
PDW BLD-RTO: 15.8 % (ref 12.4–15.4)
PDW BLD-RTO: 15.9 % (ref 12.4–15.4)
PDW BLD-RTO: 15.9 % (ref 12.4–15.4)
PDW BLD-RTO: 16 % (ref 12.4–15.4)
PDW BLD-RTO: 16.1 % (ref 12.4–15.4)
PDW BLD-RTO: 16.2 % (ref 12.4–15.4)
PDW BLD-RTO: 16.3 % (ref 12.4–15.4)
PDW BLD-RTO: 16.4 % (ref 12.4–15.4)
PDW BLD-RTO: 16.5 % (ref 12.4–15.4)
PDW BLD-RTO: 16.6 % (ref 12.4–15.4)
PDW BLD-RTO: 16.6 % (ref 12.4–15.4)
PDW BLD-RTO: 16.9 % (ref 12.4–15.4)
PDW BLD-RTO: 17.3 % (ref 12.4–15.4)
PDW BLD-RTO: 17.5 % (ref 12.4–15.4)
PDW BLD-RTO: 18 % (ref 12.4–15.4)
PERFORMED ON: ABNORMAL
PERFORMED ON: NORMAL
PH ARTERIAL: 7.11 (ref 7.35–7.45)
PH ARTERIAL: 7.37 (ref 7.35–7.45)
PH ARTERIAL: 7.37 (ref 7.35–7.45)
PH ARTERIAL: 7.38 (ref 7.35–7.45)
PH ARTERIAL: 7.38 (ref 7.35–7.45)
PH ARTERIAL: 7.39 (ref 7.35–7.45)
PH ARTERIAL: 7.4 (ref 7.35–7.45)
PH ARTERIAL: 7.42 (ref 7.35–7.45)
PH ARTERIAL: 7.43 (ref 7.35–7.45)
PH ARTERIAL: 7.44 (ref 7.35–7.45)
PH ARTERIAL: 7.45 (ref 7.35–7.45)
PH ARTERIAL: 7.47 (ref 7.35–7.45)
PH ARTERIAL: 7.49 (ref 7.35–7.45)
PH ARTERIAL: 7.49 (ref 7.35–7.45)
PH ARTERIAL: 7.5 (ref 7.35–7.45)
PH ARTERIAL: 7.5 (ref 7.35–7.45)
PH UA: 5 (ref 5–8)
PH UA: 7.5 (ref 5–8)
PH UA: 8 (ref 5–8)
PH VENOUS: 7.07 (ref 7.35–7.45)
PH VENOUS: 7.28 (ref 7.35–7.45)
PH VENOUS: 7.28 (ref 7.35–7.45)
PH VENOUS: 7.31 (ref 7.35–7.45)
PH VENOUS: 7.33 (ref 7.35–7.45)
PH VENOUS: 7.34 (ref 7.35–7.45)
PH VENOUS: 7.35 (ref 7.35–7.45)
PH VENOUS: 7.36 (ref 7.35–7.45)
PH VENOUS: 7.37 (ref 7.35–7.45)
PH VENOUS: 7.39 (ref 7.35–7.45)
PH VENOUS: 7.4 (ref 7.35–7.45)
PH VENOUS: 7.41 (ref 7.35–7.45)
PH VENOUS: 7.42 (ref 7.35–7.45)
PH VENOUS: 7.43 (ref 7.35–7.45)
PH VENOUS: 7.44 (ref 7.35–7.45)
PH VENOUS: 7.45 (ref 7.35–7.45)
PHOSPHORUS: 1.4 MG/DL (ref 2.5–4.9)
PHOSPHORUS: 1.7 MG/DL (ref 2.5–4.9)
PHOSPHORUS: 10.4 MG/DL (ref 2.5–4.9)
PHOSPHORUS: 10.9 MG/DL (ref 2.5–4.9)
PHOSPHORUS: 11.5 MG/DL (ref 2.5–4.9)
PHOSPHORUS: 11.6 MG/DL (ref 2.5–4.9)
PHOSPHORUS: 11.9 MG/DL (ref 2.5–4.9)
PHOSPHORUS: 2.2 MG/DL (ref 2.5–4.9)
PHOSPHORUS: 2.2 MG/DL (ref 2.5–4.9)
PHOSPHORUS: 2.5 MG/DL (ref 2.5–4.9)
PHOSPHORUS: 2.6 MG/DL (ref 2.5–4.9)
PHOSPHORUS: 2.7 MG/DL (ref 2.5–4.9)
PHOSPHORUS: 2.8 MG/DL (ref 2.5–4.9)
PHOSPHORUS: 2.9 MG/DL (ref 2.5–4.9)
PHOSPHORUS: 2.9 MG/DL (ref 2.5–4.9)
PHOSPHORUS: 3 MG/DL (ref 2.5–4.9)
PHOSPHORUS: 3.3 MG/DL (ref 2.5–4.9)
PHOSPHORUS: 3.5 MG/DL (ref 2.5–4.9)
PHOSPHORUS: 3.7 MG/DL (ref 2.5–4.9)
PHOSPHORUS: 3.8 MG/DL (ref 2.5–4.9)
PHOSPHORUS: 3.9 MG/DL (ref 2.5–4.9)
PHOSPHORUS: 4 MG/DL (ref 2.5–4.9)
PHOSPHORUS: 4 MG/DL (ref 2.5–4.9)
PHOSPHORUS: 4.1 MG/DL (ref 2.5–4.9)
PHOSPHORUS: 4.2 MG/DL (ref 2.5–4.9)
PHOSPHORUS: 4.4 MG/DL (ref 2.5–4.9)
PHOSPHORUS: 4.5 MG/DL (ref 2.5–4.9)
PHOSPHORUS: 4.9 MG/DL (ref 2.5–4.9)
PHOSPHORUS: 5 MG/DL (ref 2.5–4.9)
PHOSPHORUS: 5.1 MG/DL (ref 2.5–4.9)
PHOSPHORUS: 5.4 MG/DL (ref 2.5–4.9)
PHOSPHORUS: 5.4 MG/DL (ref 2.5–4.9)
PHOSPHORUS: 5.5 MG/DL (ref 2.5–4.9)
PHOSPHORUS: 5.5 MG/DL (ref 2.5–4.9)
PHOSPHORUS: 5.7 MG/DL (ref 2.5–4.9)
PHOSPHORUS: 5.8 MG/DL (ref 2.5–4.9)
PHOSPHORUS: 6 MG/DL (ref 2.5–4.9)
PHOSPHORUS: 6 MG/DL (ref 2.5–4.9)
PHOSPHORUS: 6.2 MG/DL (ref 2.5–4.9)
PHOSPHORUS: 6.4 MG/DL (ref 2.5–4.9)
PHOSPHORUS: 6.4 MG/DL (ref 2.5–4.9)
PHOSPHORUS: 6.6 MG/DL (ref 2.5–4.9)
PHOSPHORUS: 6.8 MG/DL (ref 2.5–4.9)
PHOSPHORUS: 6.9 MG/DL (ref 2.5–4.9)
PHOSPHORUS: 7.3 MG/DL (ref 2.5–4.9)
PHOSPHORUS: 7.5 MG/DL (ref 2.5–4.9)
PHOSPHORUS: 7.6 MG/DL (ref 2.5–4.9)
PHOSPHORUS: 7.7 MG/DL (ref 2.5–4.9)
PHOSPHORUS: 8.1 MG/DL (ref 2.5–4.9)
PHOSPHORUS: 8.2 MG/DL (ref 2.5–4.9)
PHOSPHORUS: 8.6 MG/DL (ref 2.5–4.9)
PHOSPHORUS: 8.7 MG/DL (ref 2.5–4.9)
PHOSPHORUS: 8.9 MG/DL (ref 2.5–4.9)
PHOSPHORUS: 9.2 MG/DL (ref 2.5–4.9)
PHOSPHORUS: 9.7 MG/DL (ref 2.5–4.9)
PHOSPHORUS: 9.9 MG/DL (ref 2.5–4.9)
PLATELET # BLD: 123 K/UL (ref 135–450)
PLATELET # BLD: 125 K/UL (ref 135–450)
PLATELET # BLD: 127 K/UL (ref 135–450)
PLATELET # BLD: 135 K/UL (ref 135–450)
PLATELET # BLD: 136 K/UL (ref 135–450)
PLATELET # BLD: 137 K/UL (ref 135–450)
PLATELET # BLD: 140 K/UL (ref 135–450)
PLATELET # BLD: 146 K/UL (ref 135–450)
PLATELET # BLD: 148 K/UL (ref 135–450)
PLATELET # BLD: 151 K/UL (ref 135–450)
PLATELET # BLD: 163 K/UL (ref 135–450)
PLATELET # BLD: 165 K/UL (ref 135–450)
PLATELET # BLD: 166 K/UL (ref 135–450)
PLATELET # BLD: 168 K/UL (ref 135–450)
PLATELET # BLD: 168 K/UL (ref 135–450)
PLATELET # BLD: 170 K/UL (ref 135–450)
PLATELET # BLD: 176 K/UL (ref 135–450)
PLATELET # BLD: 182 K/UL (ref 135–450)
PLATELET # BLD: 185 K/UL (ref 135–450)
PLATELET # BLD: 186 K/UL (ref 135–450)
PLATELET # BLD: 201 K/UL (ref 135–450)
PLATELET # BLD: 202 K/UL (ref 135–450)
PLATELET # BLD: 209 K/UL (ref 135–450)
PLATELET # BLD: 209 K/UL (ref 135–450)
PLATELET # BLD: 210 K/UL (ref 135–450)
PLATELET # BLD: 214 K/UL (ref 135–450)
PLATELET # BLD: 214 K/UL (ref 135–450)
PLATELET # BLD: 215 K/UL (ref 135–450)
PLATELET # BLD: 219 K/UL (ref 135–450)
PLATELET # BLD: 222 K/UL (ref 135–450)
PLATELET # BLD: 223 K/UL (ref 135–450)
PLATELET # BLD: 231 K/UL (ref 135–450)
PLATELET # BLD: 233 K/UL (ref 135–450)
PLATELET # BLD: 234 K/UL (ref 135–450)
PLATELET # BLD: 234 K/UL (ref 135–450)
PLATELET # BLD: 235 K/UL (ref 135–450)
PLATELET # BLD: 240 K/UL (ref 135–450)
PLATELET # BLD: 244 K/UL (ref 135–450)
PLATELET # BLD: 246 K/UL (ref 135–450)
PLATELET # BLD: 248 K/UL (ref 135–450)
PLATELET # BLD: 248 K/UL (ref 135–450)
PLATELET # BLD: 250 K/UL (ref 135–450)
PLATELET # BLD: 253 K/UL (ref 135–450)
PLATELET # BLD: 258 K/UL (ref 135–450)
PLATELET # BLD: 263 K/UL (ref 135–450)
PLATELET # BLD: 264 K/UL (ref 135–450)
PLATELET # BLD: 268 K/UL (ref 135–450)
PLATELET # BLD: 270 K/UL (ref 135–450)
PLATELET # BLD: 270 K/UL (ref 135–450)
PLATELET # BLD: 278 K/UL (ref 135–450)
PLATELET # BLD: 285 K/UL (ref 135–450)
PLATELET # BLD: 296 K/UL (ref 135–450)
PLATELET # BLD: 306 K/UL (ref 135–450)
PLATELET # BLD: 307 K/UL (ref 135–450)
PLATELET # BLD: 308 K/UL (ref 135–450)
PLATELET # BLD: 308 K/UL (ref 135–450)
PLATELET # BLD: 309 K/UL (ref 135–450)
PLATELET # BLD: 310 K/UL (ref 135–450)
PLATELET # BLD: 310 K/UL (ref 135–450)
PLATELET # BLD: 312 K/UL (ref 135–450)
PLATELET # BLD: 315 K/UL (ref 135–450)
PLATELET # BLD: 320 K/UL (ref 135–450)
PLATELET # BLD: 322 K/UL (ref 135–450)
PLATELET # BLD: 326 K/UL (ref 135–450)
PLATELET # BLD: 331 K/UL (ref 135–450)
PLATELET # BLD: 336 K/UL (ref 135–450)
PLATELET # BLD: 344 K/UL (ref 135–450)
PLATELET # BLD: 386 K/UL (ref 135–450)
PLATELET # BLD: 388 K/UL (ref 135–450)
PLATELET # BLD: 404 K/UL (ref 135–450)
PLATELET # BLD: 413 K/UL (ref 135–450)
PLATELET # BLD: 459 K/UL (ref 135–450)
PLATELET # BLD: 544 K/UL (ref 135–450)
PLATELET SLIDE REVIEW: ABNORMAL
PLATELET SLIDE REVIEW: ABNORMAL
PLATELET SLIDE REVIEW: ADEQUATE
PMV BLD AUTO: 10.1 FL (ref 5–10.5)
PMV BLD AUTO: 5.4 FL (ref 5–10.5)
PMV BLD AUTO: 5.7 FL (ref 5–10.5)
PMV BLD AUTO: 5.8 FL (ref 5–10.5)
PMV BLD AUTO: 6 FL (ref 5–10.5)
PMV BLD AUTO: 6 FL (ref 5–10.5)
PMV BLD AUTO: 6.1 FL (ref 5–10.5)
PMV BLD AUTO: 6.2 FL (ref 5–10.5)
PMV BLD AUTO: 6.3 FL (ref 5–10.5)
PMV BLD AUTO: 6.4 FL (ref 5–10.5)
PMV BLD AUTO: 6.5 FL (ref 5–10.5)
PMV BLD AUTO: 6.6 FL (ref 5–10.5)
PMV BLD AUTO: 6.7 FL (ref 5–10.5)
PMV BLD AUTO: 6.8 FL (ref 5–10.5)
PMV BLD AUTO: 6.8 FL (ref 5–10.5)
PMV BLD AUTO: 6.9 FL (ref 5–10.5)
PMV BLD AUTO: 7 FL (ref 5–10.5)
PMV BLD AUTO: 7 FL (ref 5–10.5)
PMV BLD AUTO: 7.2 FL (ref 5–10.5)
PMV BLD AUTO: 7.6 FL (ref 5–10.5)
PMV BLD AUTO: 8.2 FL (ref 5–10.5)
PMV BLD AUTO: 8.4 FL (ref 5–10.5)
PMV BLD AUTO: 8.5 FL (ref 5–10.5)
PMV BLD AUTO: 8.6 FL (ref 5–10.5)
PMV BLD AUTO: 8.8 FL (ref 5–10.5)
PMV BLD AUTO: 8.9 FL (ref 5–10.5)
PMV BLD AUTO: 9 FL (ref 5–10.5)
PMV BLD AUTO: 9 FL (ref 5–10.5)
PMV BLD AUTO: 9.1 FL (ref 5–10.5)
PMV BLD AUTO: 9.1 FL (ref 5–10.5)
PMV BLD AUTO: 9.3 FL (ref 5–10.5)
PMV BLD AUTO: 9.3 FL (ref 5–10.5)
PMV BLD AUTO: 9.4 FL (ref 5–10.5)
PMV BLD AUTO: 9.5 FL (ref 5–10.5)
PMV BLD AUTO: 9.6 FL (ref 5–10.5)
PMV BLD AUTO: 9.7 FL (ref 5–10.5)
PMV BLD AUTO: 9.8 FL (ref 5–10.5)
PMV BLD AUTO: 9.9 FL (ref 5–10.5)
PO2 ARTERIAL: 101 MMHG (ref 75–108)
PO2 ARTERIAL: 107.4 MMHG (ref 75–108)
PO2 ARTERIAL: 129.3 MMHG (ref 75–108)
PO2 ARTERIAL: 48.5 MMHG (ref 75–108)
PO2 ARTERIAL: 60.4 MMHG (ref 75–108)
PO2 ARTERIAL: 60.6 MMHG (ref 75–108)
PO2 ARTERIAL: 62.2 MMHG (ref 75–108)
PO2 ARTERIAL: 62.3 MMHG (ref 75–108)
PO2 ARTERIAL: 63.3 MMHG (ref 75–108)
PO2 ARTERIAL: 63.8 MMHG (ref 75–108)
PO2 ARTERIAL: 64.5 MMHG (ref 75–108)
PO2 ARTERIAL: 64.5 MMHG (ref 75–108)
PO2 ARTERIAL: 64.8 MMHG (ref 75–108)
PO2 ARTERIAL: 65.1 MMHG (ref 75–108)
PO2 ARTERIAL: 66.1 MMHG (ref 75–108)
PO2 ARTERIAL: 67.5 MMHG (ref 75–108)
PO2 ARTERIAL: 67.8 MMHG (ref 75–108)
PO2 ARTERIAL: 68.7 MMHG (ref 75–108)
PO2 ARTERIAL: 69 MMHG (ref 75–108)
PO2 ARTERIAL: 71.3 MMHG (ref 75–108)
PO2 ARTERIAL: 71.4 MMHG (ref 75–108)
PO2 ARTERIAL: 72.1 MMHG (ref 75–108)
PO2 ARTERIAL: 72.4 MMHG (ref 75–108)
PO2 ARTERIAL: 72.4 MMHG (ref 75–108)
PO2 ARTERIAL: 73.2 MMHG (ref 75–108)
PO2 ARTERIAL: 73.6 MMHG (ref 75–108)
PO2 ARTERIAL: 74.8 MMHG (ref 75–108)
PO2 ARTERIAL: 77.1 MMHG (ref 75–108)
PO2 ARTERIAL: 78.6 MMHG (ref 75–108)
PO2 ARTERIAL: 80.7 MMHG (ref 75–108)
PO2 ARTERIAL: 81.1 MMHG (ref 75–108)
PO2 ARTERIAL: 82.1 MMHG (ref 75–108)
PO2 ARTERIAL: 84.3 MMHG (ref 75–108)
PO2 ARTERIAL: 86.7 MMHG (ref 75–108)
PO2 ARTERIAL: 89 MMHG (ref 75–108)
PO2 ARTERIAL: 93.6 MMHG (ref 75–108)
PO2 ARTERIAL: 99.4 MMHG (ref 75–108)
PO2, VEN: 128 MMHG (ref 25–40)
PO2, VEN: 38.7 MMHG (ref 25–40)
PO2, VEN: 40.1 MMHG (ref 25–40)
PO2, VEN: 87.2 MMHG (ref 25–40)
POIKILOCYTES: ABNORMAL
POLYCHROMASIA: ABNORMAL
POTASSIUM REFLEX MAGNESIUM: 3.6 MMOL/L (ref 3.5–5.1)
POTASSIUM REFLEX MAGNESIUM: 3.7 MMOL/L (ref 3.5–5.1)
POTASSIUM REFLEX MAGNESIUM: 3.8 MMOL/L (ref 3.5–5.1)
POTASSIUM REFLEX MAGNESIUM: 4 MMOL/L (ref 3.5–5.1)
POTASSIUM REFLEX MAGNESIUM: 4.1 MMOL/L (ref 3.5–5.1)
POTASSIUM REFLEX MAGNESIUM: 4.2 MMOL/L (ref 3.5–5.1)
POTASSIUM REFLEX MAGNESIUM: 4.3 MMOL/L (ref 3.5–5.1)
POTASSIUM REFLEX MAGNESIUM: 4.3 MMOL/L (ref 3.5–5.1)
POTASSIUM REFLEX MAGNESIUM: 4.5 MMOL/L (ref 3.5–5.1)
POTASSIUM REFLEX MAGNESIUM: 4.9 MMOL/L (ref 3.5–5.1)
POTASSIUM REFLEX MAGNESIUM: 4.9 MMOL/L (ref 3.5–5.1)
POTASSIUM REFLEX MAGNESIUM: 5.2 MMOL/L (ref 3.5–5.1)
POTASSIUM REFLEX MAGNESIUM: 5.4 MMOL/L (ref 3.5–5.1)
POTASSIUM REFLEX MAGNESIUM: 5.4 MMOL/L (ref 3.5–5.1)
POTASSIUM REFLEX MAGNESIUM: 6 MMOL/L (ref 3.5–5.1)
POTASSIUM SERPL-SCNC: 3.1 MMOL/L (ref 3.5–5.1)
POTASSIUM SERPL-SCNC: 3.5 MMOL/L (ref 3.5–5.1)
POTASSIUM SERPL-SCNC: 3.6 MMOL/L (ref 3.5–5.1)
POTASSIUM SERPL-SCNC: 3.7 MMOL/L (ref 3.5–5.1)
POTASSIUM SERPL-SCNC: 3.8 MMOL/L (ref 3.5–5.1)
POTASSIUM SERPL-SCNC: 3.9 MMOL/L (ref 3.5–5.1)
POTASSIUM SERPL-SCNC: 4 MMOL/L (ref 3.5–5.1)
POTASSIUM SERPL-SCNC: 4.1 MMOL/L (ref 3.5–5.1)
POTASSIUM SERPL-SCNC: 4.2 MMOL/L (ref 3.5–5.1)
POTASSIUM SERPL-SCNC: 4.3 MMOL/L (ref 3.5–5.1)
POTASSIUM SERPL-SCNC: 4.4 MMOL/L (ref 3.5–5.1)
POTASSIUM SERPL-SCNC: 4.5 MMOL/L (ref 3.5–5.1)
POTASSIUM SERPL-SCNC: 4.6 MMOL/L (ref 3.5–5.1)
POTASSIUM SERPL-SCNC: 4.6 MMOL/L (ref 3.5–5.1)
POTASSIUM SERPL-SCNC: 4.7 MMOL/L (ref 3.5–5.1)
POTASSIUM SERPL-SCNC: 4.8 MMOL/L (ref 3.5–5.1)
POTASSIUM SERPL-SCNC: 4.9 MMOL/L (ref 3.5–5.1)
POTASSIUM SERPL-SCNC: 4.9 MMOL/L (ref 3.5–5.1)
POTASSIUM SERPL-SCNC: 5 MMOL/L (ref 3.5–5.1)
POTASSIUM SERPL-SCNC: 5 MMOL/L (ref 3.5–5.1)
POTASSIUM SERPL-SCNC: 5.1 MMOL/L (ref 3.5–5.1)
POTASSIUM SERPL-SCNC: 5.4 MMOL/L (ref 3.5–5.1)
POTASSIUM SERPL-SCNC: 5.6 MMOL/L (ref 3.5–5.1)
POTASSIUM SERPL-SCNC: 5.6 MMOL/L (ref 3.5–5.1)
POTASSIUM SERPL-SCNC: 5.7 MMOL/L (ref 3.5–5.1)
POTASSIUM SERPL-SCNC: 5.8 MMOL/L (ref 3.5–5.1)
POTASSIUM SERPL-SCNC: 5.9 MMOL/L (ref 3.5–5.1)
PREALBUMIN: 14.9 MG/DL (ref 20–40)
PROCALCITONIN: 2.09 NG/ML (ref 0–0.15)
PROMYELOCYTES PERCENT: 2 %
PROMYELOCYTES PERCENT: 2 %
PROTEIN UA: 100 MG/DL
PROTEIN UA: >=300 MG/DL
PROTEIN UA: ABNORMAL MG/DL
PROTHROMBIN TIME: 12.7 SEC (ref 9.9–12.7)
PROTHROMBIN TIME: 13.4 SEC (ref 9.9–12.7)
PROTHROMBIN TIME: 13.7 SEC (ref 9.9–12.7)
PROTHROMBIN TIME: 15.5 SEC (ref 9.9–12.7)
PROTHROMBIN TIME: 15.8 SEC (ref 9.9–12.7)
PROTHROMBIN TIME: 15.9 SEC (ref 9.9–12.7)
PROTHROMBIN TIME: 16.3 SEC (ref 9.9–12.7)
PROTHROMBIN TIME: 17.5 SEC (ref 9.9–12.7)
PROTHROMBIN TIME: 18.6 SEC (ref 9.9–12.7)
PROTHROMBIN TIME: 18.6 SEC (ref 9.9–12.7)
RAPID INFLUENZA  B AGN: NEGATIVE
RAPID INFLUENZA A AGN: NEGATIVE
RBC # BLD: 1.83 M/UL (ref 4.2–5.9)
RBC # BLD: 1.96 M/UL (ref 4.2–5.9)
RBC # BLD: 2.1 M/UL (ref 4.2–5.9)
RBC # BLD: 2.12 M/UL (ref 4.2–5.9)
RBC # BLD: 2.17 M/UL (ref 4.2–5.9)
RBC # BLD: 2.17 M/UL (ref 4.2–5.9)
RBC # BLD: 2.22 M/UL (ref 4.2–5.9)
RBC # BLD: 2.24 M/UL (ref 4.2–5.9)
RBC # BLD: 2.3 M/UL (ref 4.2–5.9)
RBC # BLD: 2.32 M/UL (ref 4.2–5.9)
RBC # BLD: 2.32 M/UL (ref 4.2–5.9)
RBC # BLD: 2.34 M/UL (ref 4.2–5.9)
RBC # BLD: 2.38 M/UL (ref 4.2–5.9)
RBC # BLD: 2.38 M/UL (ref 4.2–5.9)
RBC # BLD: 2.4 M/UL (ref 4.2–5.9)
RBC # BLD: 2.41 M/UL (ref 4.2–5.9)
RBC # BLD: 2.41 M/UL (ref 4.2–5.9)
RBC # BLD: 2.43 M/UL (ref 4.2–5.9)
RBC # BLD: 2.44 M/UL (ref 4.2–5.9)
RBC # BLD: 2.44 M/UL (ref 4.2–5.9)
RBC # BLD: 2.45 M/UL (ref 4.2–5.9)
RBC # BLD: 2.46 M/UL (ref 4.2–5.9)
RBC # BLD: 2.49 M/UL (ref 4.2–5.9)
RBC # BLD: 2.51 M/UL (ref 4.2–5.9)
RBC # BLD: 2.51 M/UL (ref 4.2–5.9)
RBC # BLD: 2.52 M/UL (ref 4.2–5.9)
RBC # BLD: 2.52 M/UL (ref 4.2–5.9)
RBC # BLD: 2.53 M/UL (ref 4.2–5.9)
RBC # BLD: 2.54 M/UL (ref 4.2–5.9)
RBC # BLD: 2.54 M/UL (ref 4.2–5.9)
RBC # BLD: 2.55 M/UL (ref 4.2–5.9)
RBC # BLD: 2.56 M/UL (ref 4.2–5.9)
RBC # BLD: 2.59 M/UL (ref 4.2–5.9)
RBC # BLD: 2.66 M/UL (ref 4.2–5.9)
RBC # BLD: 2.66 M/UL (ref 4.2–5.9)
RBC # BLD: 2.67 M/UL (ref 4.2–5.9)
RBC # BLD: 2.67 M/UL (ref 4.2–5.9)
RBC # BLD: 2.78 M/UL (ref 4.2–5.9)
RBC # BLD: 2.81 M/UL (ref 4.2–5.9)
RBC # BLD: 2.81 M/UL (ref 4.2–5.9)
RBC # BLD: 2.82 M/UL (ref 4.2–5.9)
RBC # BLD: 2.83 M/UL (ref 4.2–5.9)
RBC # BLD: 2.84 M/UL (ref 4.2–5.9)
RBC # BLD: 2.85 M/UL (ref 4.2–5.9)
RBC # BLD: 2.85 M/UL (ref 4.2–5.9)
RBC # BLD: 2.86 M/UL (ref 4.2–5.9)
RBC # BLD: 2.94 M/UL (ref 4.2–5.9)
RBC # BLD: 2.94 M/UL (ref 4.2–5.9)
RBC # BLD: 2.95 M/UL (ref 4.2–5.9)
RBC # BLD: 3.08 M/UL (ref 4.2–5.9)
RBC # BLD: 3.19 M/UL (ref 4.2–5.9)
RBC # BLD: 3.29 M/UL (ref 4.2–5.9)
RBC # BLD: 3.3 M/UL (ref 4.2–5.9)
RBC # BLD: 3.57 M/UL (ref 4.2–5.9)
RBC # BLD: 3.58 M/UL (ref 4.2–5.9)
RBC # BLD: 3.58 M/UL (ref 4.2–5.9)
RBC # BLD: 3.64 M/UL (ref 4.2–5.9)
RBC # BLD: 3.68 M/UL (ref 4.2–5.9)
RBC # BLD: 3.69 M/UL (ref 4.2–5.9)
RBC # BLD: 3.73 M/UL (ref 4.2–5.9)
RBC # BLD: 3.73 M/UL (ref 4.2–5.9)
RBC # BLD: 3.86 M/UL (ref 4.2–5.9)
RBC # BLD: 3.9 M/UL (ref 4.2–5.9)
RBC # BLD: 3.92 M/UL (ref 4.2–5.9)
RBC # BLD: 3.92 M/UL (ref 4.2–5.9)
RBC # BLD: 3.93 M/UL (ref 4.2–5.9)
RBC # BLD: 3.94 M/UL (ref 4.2–5.9)
RBC # BLD: 4.01 M/UL (ref 4.2–5.9)
RBC UA: >100 /HPF (ref 0–4)
RBC UA: ABNORMAL /HPF (ref 0–4)
RBC UA: ABNORMAL /HPF (ref 0–4)
RBC, BAL: 4000 /CUMM
RBC, BAL: 5500 /CUMM
RENAL EPITHELIAL, UA: ABNORMAL /HPF (ref 0–1)
REPORT: NORMAL
SARS-COV-2, NAAT: NOT DETECTED
SEDIMENTATION RATE, ERYTHROCYTE: 94 MM/HR (ref 0–15)
SEDIMENTATION RATE, ERYTHROCYTE: >120 MM/HR (ref 0–15)
SEDIMENTATION RATE, ERYTHROCYTE: >120 MM/HR (ref 0–15)
SEGMENTED NEUTROPHILS, BAL: 55 % (ref 5–10)
SEGMENTED NEUTROPHILS, BAL: 56 % (ref 5–10)
SLIDE REVIEW: ABNORMAL
SMUDGE CELLS: PRESENT
SODIUM BLD-SCNC: 125 MMOL/L (ref 136–145)
SODIUM BLD-SCNC: 129 MMOL/L (ref 136–145)
SODIUM BLD-SCNC: 129 MMOL/L (ref 136–145)
SODIUM BLD-SCNC: 130 MMOL/L (ref 136–145)
SODIUM BLD-SCNC: 131 MMOL/L (ref 136–145)
SODIUM BLD-SCNC: 132 MMOL/L (ref 136–145)
SODIUM BLD-SCNC: 133 MMOL/L (ref 136–145)
SODIUM BLD-SCNC: 134 MMOL/L (ref 136–145)
SODIUM BLD-SCNC: 135 MMOL/L (ref 136–145)
SODIUM BLD-SCNC: 136 MMOL/L (ref 136–145)
SODIUM BLD-SCNC: 137 MMOL/L (ref 136–145)
SODIUM BLD-SCNC: 138 MMOL/L (ref 136–145)
SODIUM BLD-SCNC: 139 MMOL/L (ref 136–145)
SODIUM BLD-SCNC: 140 MMOL/L (ref 136–145)
SODIUM BLD-SCNC: 141 MMOL/L (ref 136–145)
SODIUM BLD-SCNC: 142 MMOL/L (ref 136–145)
SODIUM BLD-SCNC: 142 MMOL/L (ref 136–145)
SPECIFIC GRAVITY UA: 1.01 (ref 1–1.03)
SPECIFIC GRAVITY UA: 1.02 (ref 1–1.03)
SPECIFIC GRAVITY UA: >=1.03 (ref 1–1.03)
TCO2 ARTERIAL: 14.2 MMOL/L
TCO2 ARTERIAL: 15 MMOL/L
TCO2 ARTERIAL: 16.8 MMOL/L
TCO2 ARTERIAL: 17.5 MMOL/L
TCO2 ARTERIAL: 17.8 MMOL/L
TCO2 ARTERIAL: 17.9 MMOL/L
TCO2 ARTERIAL: 18 MMOL/L
TCO2 ARTERIAL: 18.2 MMOL/L
TCO2 ARTERIAL: 18.4 MMOL/L
TCO2 ARTERIAL: 18.6 MMOL/L
TCO2 ARTERIAL: 18.9 MMOL/L
TCO2 ARTERIAL: 19.3 MMOL/L
TCO2 ARTERIAL: 19.3 MMOL/L
TCO2 ARTERIAL: 19.6 MMOL/L
TCO2 ARTERIAL: 19.7 MMOL/L
TCO2 ARTERIAL: 20 MMOL/L
TCO2 ARTERIAL: 20.2 MMOL/L
TCO2 ARTERIAL: 20.4 MMOL/L
TCO2 ARTERIAL: 20.4 MMOL/L
TCO2 ARTERIAL: 20.6 MMOL/L
TCO2 ARTERIAL: 20.7 MMOL/L
TCO2 ARTERIAL: 20.7 MMOL/L
TCO2 ARTERIAL: 21 MMOL/L
TCO2 ARTERIAL: 21.5 MMOL/L
TCO2 ARTERIAL: 22 MMOL/L
TCO2 ARTERIAL: 22.1 MMOL/L
TCO2 ARTERIAL: 22.3 MMOL/L
TCO2 ARTERIAL: 22.6 MMOL/L
TCO2 ARTERIAL: 23.2 MMOL/L
TCO2 ARTERIAL: 23.4 MMOL/L
TCO2 ARTERIAL: 23.5 MMOL/L
TCO2 ARTERIAL: 23.9 MMOL/L
TCO2 ARTERIAL: 24 MMOL/L
TCO2 ARTERIAL: 24.1 MMOL/L
TCO2 ARTERIAL: 24.5 MMOL/L
TCO2 ARTERIAL: 24.6 MMOL/L
TCO2 ARTERIAL: 25 MMOL/L
TCO2 CALC VENOUS: 18 MMOL/L
TCO2 CALC VENOUS: 20 MMOL/L
TCO2 CALC VENOUS: 21 MMOL/L
TCO2 CALC VENOUS: 26 MMOL/L
TEAR DROP CELLS: ABNORMAL
TOTAL CK: 1549 U/L (ref 39–308)
TOTAL CK: 23 U/L (ref 39–308)
TOTAL CK: 4512 U/L (ref 39–308)
TOTAL CK: 676 U/L (ref 39–308)
TOTAL PROTEIN: 5.2 G/DL (ref 6.4–8.2)
TOTAL PROTEIN: 5.4 G/DL (ref 6.4–8.2)
TOTAL PROTEIN: 5.5 G/DL (ref 6.4–8.2)
TOTAL PROTEIN: 5.6 G/DL (ref 6.4–8.2)
TOTAL PROTEIN: 5.7 G/DL (ref 6.4–8.2)
TOTAL PROTEIN: 5.8 G/DL (ref 6.4–8.2)
TOTAL PROTEIN: 5.8 G/DL (ref 6.4–8.2)
TOTAL PROTEIN: 5.9 G/DL (ref 6.4–8.2)
TOTAL PROTEIN: 6 G/DL (ref 6.4–8.2)
TOTAL PROTEIN: 6 G/DL (ref 6.4–8.2)
TOTAL PROTEIN: 6.1 G/DL (ref 6.4–8.2)
TOTAL PROTEIN: 6.1 G/DL (ref 6.4–8.2)
TOTAL PROTEIN: 6.5 G/DL (ref 6.4–8.2)
TOTAL PROTEIN: 6.5 G/DL (ref 6.4–8.2)
TOTAL PROTEIN: 6.8 G/DL (ref 6.4–8.2)
TOTAL PROTEIN: 7 G/DL (ref 6.4–8.2)
TOXIC GRANULATION: PRESENT
TRIGL SERPL-MCNC: 274 MG/DL (ref 0–150)
TRIGL SERPL-MCNC: 312 MG/DL (ref 0–150)
TRIGL SERPL-MCNC: 707 MG/DL (ref 0–150)
TRIGL SERPL-MCNC: 777 MG/DL (ref 0–150)
TROPONIN: 0.01 NG/ML
TROPONIN: 0.01 NG/ML
TROPONIN: 0.1 NG/ML
TROPONIN: 0.17 NG/ML
TROPONIN: 0.2 NG/ML
TROPONIN: 0.2 NG/ML
TROPONIN: 0.21 NG/ML
TROPONIN: <0.01 NG/ML
TSH SERPL DL<=0.05 MIU/L-ACNC: 3.02 UIU/ML (ref 0.27–4.2)
TSH SERPL DL<=0.05 MIU/L-ACNC: 3.48 UIU/ML (ref 0.27–4.2)
URIC ACID, SERUM: 10.2 MG/DL (ref 3.5–7.2)
URINE CULTURE, ROUTINE: NORMAL
URINE CULTURE, ROUTINE: NORMAL
URINE REFLEX TO CULTURE: YES
URINE TYPE: ABNORMAL
UROBILINOGEN, URINE: 0.2 E.U./DL
VACUOLATED NEUTROPHILS: PRESENT
VITAMIN B-12: 566 PG/ML (ref 211–911)
WBC # BLD: 10.1 K/UL (ref 4–11)
WBC # BLD: 10.1 K/UL (ref 4–11)
WBC # BLD: 10.2 K/UL (ref 4–11)
WBC # BLD: 10.7 K/UL (ref 4–11)
WBC # BLD: 10.9 K/UL (ref 4–11)
WBC # BLD: 11 K/UL (ref 4–11)
WBC # BLD: 11 K/UL (ref 4–11)
WBC # BLD: 11.4 K/UL (ref 4–11)
WBC # BLD: 11.4 K/UL (ref 4–11)
WBC # BLD: 11.7 K/UL (ref 4–11)
WBC # BLD: 11.8 K/UL (ref 4–11)
WBC # BLD: 12 K/UL (ref 4–11)
WBC # BLD: 12.4 K/UL (ref 4–11)
WBC # BLD: 12.5 K/UL (ref 4–11)
WBC # BLD: 12.7 K/UL (ref 4–11)
WBC # BLD: 12.8 K/UL (ref 4–11)
WBC # BLD: 13.4 K/UL (ref 4–11)
WBC # BLD: 13.9 K/UL (ref 4–11)
WBC # BLD: 14.5 K/UL (ref 4–11)
WBC # BLD: 14.5 K/UL (ref 4–11)
WBC # BLD: 14.6 K/UL (ref 4–11)
WBC # BLD: 14.8 K/UL (ref 4–11)
WBC # BLD: 14.8 K/UL (ref 4–11)
WBC # BLD: 15.8 K/UL (ref 4–11)
WBC # BLD: 17.4 K/UL (ref 4–11)
WBC # BLD: 17.8 K/UL (ref 4–11)
WBC # BLD: 18.3 K/UL (ref 4–11)
WBC # BLD: 18.6 K/UL (ref 4–11)
WBC # BLD: 18.9 K/UL (ref 4–11)
WBC # BLD: 19.2 K/UL (ref 4–11)
WBC # BLD: 19.4 K/UL (ref 4–11)
WBC # BLD: 20.7 K/UL (ref 4–11)
WBC # BLD: 20.8 K/UL (ref 4–11)
WBC # BLD: 21.2 K/UL (ref 4–11)
WBC # BLD: 21.6 K/UL (ref 4–11)
WBC # BLD: 23 K/UL (ref 4–11)
WBC # BLD: 23.9 K/UL (ref 4–11)
WBC # BLD: 24.7 K/UL (ref 4–11)
WBC # BLD: 25.5 K/UL (ref 4–11)
WBC # BLD: 26.3 K/UL (ref 4–11)
WBC # BLD: 27.9 K/UL (ref 4–11)
WBC # BLD: 28 K/UL (ref 4–11)
WBC # BLD: 29.7 K/UL (ref 4–11)
WBC # BLD: 30.2 K/UL (ref 4–11)
WBC # BLD: 32 K/UL (ref 4–11)
WBC # BLD: 32.2 K/UL (ref 4–11)
WBC # BLD: 33.1 K/UL (ref 4–11)
WBC # BLD: 4.2 K/UL (ref 4–11)
WBC # BLD: 4.4 K/UL (ref 4–11)
WBC # BLD: 5.2 K/UL (ref 4–11)
WBC # BLD: 5.3 K/UL (ref 4–11)
WBC # BLD: 5.4 K/UL (ref 4–11)
WBC # BLD: 5.8 K/UL (ref 4–11)
WBC # BLD: 6.6 K/UL (ref 4–11)
WBC # BLD: 6.8 K/UL (ref 4–11)
WBC # BLD: 6.8 K/UL (ref 4–11)
WBC # BLD: 6.9 K/UL (ref 4–11)
WBC # BLD: 7.5 K/UL (ref 4–11)
WBC # BLD: 7.6 K/UL (ref 4–11)
WBC # BLD: 7.7 K/UL (ref 4–11)
WBC # BLD: 7.8 K/UL (ref 4–11)
WBC # BLD: 7.9 K/UL (ref 4–11)
WBC # BLD: 8.2 K/UL (ref 4–11)
WBC # BLD: 8.3 K/UL (ref 4–11)
WBC # BLD: 8.3 K/UL (ref 4–11)
WBC # BLD: 8.4 K/UL (ref 4–11)
WBC # BLD: 8.5 K/UL (ref 4–11)
WBC # BLD: 8.6 K/UL (ref 4–11)
WBC # BLD: 8.8 K/UL (ref 4–11)
WBC # BLD: 8.8 K/UL (ref 4–11)
WBC # BLD: 9.1 K/UL (ref 4–11)
WBC # BLD: 9.6 K/UL (ref 4–11)
WBC # BLD: 9.9 K/UL (ref 4–11)
WBC UA: ABNORMAL /HPF (ref 0–5)
WBC/EPI CELLS BAL: 176 /CUMM
WBC/EPI CELLS BAL: 500 /CUMM
WOUND/ABSCESS: ABNORMAL

## 2022-01-01 PROCEDURE — 2580000003 HC RX 258: Performed by: INTERNAL MEDICINE

## 2022-01-01 PROCEDURE — 97530 THERAPEUTIC ACTIVITIES: CPT

## 2022-01-01 PROCEDURE — 94003 VENT MGMT INPAT SUBQ DAY: CPT

## 2022-01-01 PROCEDURE — 83605 ASSAY OF LACTIC ACID: CPT

## 2022-01-01 PROCEDURE — 87070 CULTURE OTHR SPECIMN AEROBIC: CPT

## 2022-01-01 PROCEDURE — 99152 MOD SED SAME PHYS/QHP 5/>YRS: CPT | Performed by: INTERNAL MEDICINE

## 2022-01-01 PROCEDURE — 2060000000 HC ICU INTERMEDIATE R&B

## 2022-01-01 PROCEDURE — C9113 INJ PANTOPRAZOLE SODIUM, VIA: HCPCS | Performed by: INTERNAL MEDICINE

## 2022-01-01 PROCEDURE — 94761 N-INVAS EAR/PLS OXIMETRY MLT: CPT

## 2022-01-01 PROCEDURE — 6370000000 HC RX 637 (ALT 250 FOR IP): Performed by: STUDENT IN AN ORGANIZED HEALTH CARE EDUCATION/TRAINING PROGRAM

## 2022-01-01 PROCEDURE — 99291 CRITICAL CARE FIRST HOUR: CPT | Performed by: INTERNAL MEDICINE

## 2022-01-01 PROCEDURE — 85014 HEMATOCRIT: CPT

## 2022-01-01 PROCEDURE — 84100 ASSAY OF PHOSPHORUS: CPT

## 2022-01-01 PROCEDURE — 36415 COLL VENOUS BLD VENIPUNCTURE: CPT

## 2022-01-01 PROCEDURE — 99232 SBSQ HOSP IP/OBS MODERATE 35: CPT | Performed by: INTERNAL MEDICINE

## 2022-01-01 PROCEDURE — 6360000002 HC RX W HCPCS: Performed by: INTERNAL MEDICINE

## 2022-01-01 PROCEDURE — 82330 ASSAY OF CALCIUM: CPT

## 2022-01-01 PROCEDURE — 6360000002 HC RX W HCPCS: Performed by: SURGERY

## 2022-01-01 PROCEDURE — 0DJ08ZZ INSPECTION OF UPPER INTESTINAL TRACT, VIA NATURAL OR ARTIFICIAL OPENING ENDOSCOPIC: ICD-10-PCS | Performed by: INTERNAL MEDICINE

## 2022-01-01 PROCEDURE — 85025 COMPLETE CBC W/AUTO DIFF WBC: CPT

## 2022-01-01 PROCEDURE — 80076 HEPATIC FUNCTION PANEL: CPT

## 2022-01-01 PROCEDURE — 6370000000 HC RX 637 (ALT 250 FOR IP): Performed by: INTERNAL MEDICINE

## 2022-01-01 PROCEDURE — 80069 RENAL FUNCTION PANEL: CPT

## 2022-01-01 PROCEDURE — 87150 DNA/RNA AMPLIFIED PROBE: CPT

## 2022-01-01 PROCEDURE — 97116 GAIT TRAINING THERAPY: CPT

## 2022-01-01 PROCEDURE — 84484 ASSAY OF TROPONIN QUANT: CPT

## 2022-01-01 PROCEDURE — 6370000000 HC RX 637 (ALT 250 FOR IP): Performed by: PHYSICAL MEDICINE & REHABILITATION

## 2022-01-01 PROCEDURE — 88311 DECALCIFY TISSUE: CPT

## 2022-01-01 PROCEDURE — 2500000003 HC RX 250 WO HCPCS: Performed by: INTERNAL MEDICINE

## 2022-01-01 PROCEDURE — 6360000002 HC RX W HCPCS

## 2022-01-01 PROCEDURE — 36592 COLLECT BLOOD FROM PICC: CPT

## 2022-01-01 PROCEDURE — 99233 SBSQ HOSP IP/OBS HIGH 50: CPT | Performed by: INTERNAL MEDICINE

## 2022-01-01 PROCEDURE — 2700000000 HC OXYGEN THERAPY PER DAY

## 2022-01-01 PROCEDURE — 87086 URINE CULTURE/COLONY COUNT: CPT

## 2022-01-01 PROCEDURE — 43501 SURGICAL REPAIR OF STOMACH: CPT | Performed by: SURGERY

## 2022-01-01 PROCEDURE — 6370000000 HC RX 637 (ALT 250 FOR IP): Performed by: PODIATRIST

## 2022-01-01 PROCEDURE — 2580000003 HC RX 258: Performed by: PODIATRIST

## 2022-01-01 PROCEDURE — 97535 SELF CARE MNGMENT TRAINING: CPT

## 2022-01-01 PROCEDURE — 85027 COMPLETE CBC AUTOMATED: CPT

## 2022-01-01 PROCEDURE — 7100000010 HC PHASE II RECOVERY - FIRST 15 MIN: Performed by: INTERNAL MEDICINE

## 2022-01-01 PROCEDURE — 86900 BLOOD TYPING SEROLOGIC ABO: CPT

## 2022-01-01 PROCEDURE — 99233 SBSQ HOSP IP/OBS HIGH 50: CPT | Performed by: PHYSICIAN ASSISTANT

## 2022-01-01 PROCEDURE — 97110 THERAPEUTIC EXERCISES: CPT

## 2022-01-01 PROCEDURE — 31500 INSERT EMERGENCY AIRWAY: CPT

## 2022-01-01 PROCEDURE — 82803 BLOOD GASES ANY COMBINATION: CPT

## 2022-01-01 PROCEDURE — 2000000000 HC ICU R&B

## 2022-01-01 PROCEDURE — A4217 STERILE WATER/SALINE, 500 ML: HCPCS | Performed by: ORTHOPAEDIC SURGERY

## 2022-01-01 PROCEDURE — 80048 BASIC METABOLIC PNL TOTAL CA: CPT

## 2022-01-01 PROCEDURE — 2500000003 HC RX 250 WO HCPCS: Performed by: SURGERY

## 2022-01-01 PROCEDURE — 82550 ASSAY OF CK (CPK): CPT

## 2022-01-01 PROCEDURE — 99285 EMERGENCY DEPT VISIT HI MDM: CPT

## 2022-01-01 PROCEDURE — 86901 BLOOD TYPING SEROLOGIC RH(D): CPT

## 2022-01-01 PROCEDURE — 99292 CRITICAL CARE ADDL 30 MIN: CPT | Performed by: INTERNAL MEDICINE

## 2022-01-01 PROCEDURE — 6360000004 HC RX CONTRAST MEDICATION: Performed by: INTERNAL MEDICINE

## 2022-01-01 PROCEDURE — 97112 NEUROMUSCULAR REEDUCATION: CPT

## 2022-01-01 PROCEDURE — P9047 ALBUMIN (HUMAN), 25%, 50ML: HCPCS | Performed by: INTERNAL MEDICINE

## 2022-01-01 PROCEDURE — 92526 ORAL FUNCTION THERAPY: CPT

## 2022-01-01 PROCEDURE — 96360 HYDRATION IV INFUSION INIT: CPT

## 2022-01-01 PROCEDURE — 1280000000 HC REHAB R&B

## 2022-01-01 PROCEDURE — 0DHA0UZ INSERTION OF FEEDING DEVICE INTO JEJUNUM, OPEN APPROACH: ICD-10-PCS | Performed by: SURGERY

## 2022-01-01 PROCEDURE — 84478 ASSAY OF TRIGLYCERIDES: CPT

## 2022-01-01 PROCEDURE — 6360000002 HC RX W HCPCS: Performed by: ORTHOPAEDIC SURGERY

## 2022-01-01 PROCEDURE — 36430 TRANSFUSION BLD/BLD COMPNT: CPT

## 2022-01-01 PROCEDURE — 97162 PT EVAL MOD COMPLEX 30 MIN: CPT

## 2022-01-01 PROCEDURE — 87635 SARS-COV-2 COVID-19 AMP PRB: CPT

## 2022-01-01 PROCEDURE — 85520 HEPARIN ASSAY: CPT

## 2022-01-01 PROCEDURE — 6360000002 HC RX W HCPCS: Performed by: STUDENT IN AN ORGANIZED HEALTH CARE EDUCATION/TRAINING PROGRAM

## 2022-01-01 PROCEDURE — 86923 COMPATIBILITY TEST ELECTRIC: CPT

## 2022-01-01 PROCEDURE — 95822 EEG COMA OR SLEEP ONLY: CPT | Performed by: PSYCHIATRY & NEUROLOGY

## 2022-01-01 PROCEDURE — 90945 DIALYSIS ONE EVALUATION: CPT

## 2022-01-01 PROCEDURE — 99024 POSTOP FOLLOW-UP VISIT: CPT | Performed by: PHYSICIAN ASSISTANT

## 2022-01-01 PROCEDURE — 6360000002 HC RX W HCPCS: Performed by: NURSE ANESTHETIST, CERTIFIED REGISTERED

## 2022-01-01 PROCEDURE — 92611 MOTION FLUOROSCOPY/SWALLOW: CPT

## 2022-01-01 PROCEDURE — 71045 X-RAY EXAM CHEST 1 VIEW: CPT

## 2022-01-01 PROCEDURE — 87040 BLOOD CULTURE FOR BACTERIA: CPT

## 2022-01-01 PROCEDURE — 95816 EEG AWAKE AND DROWSY: CPT

## 2022-01-01 PROCEDURE — 87075 CULTR BACTERIA EXCEPT BLOOD: CPT

## 2022-01-01 PROCEDURE — 51798 US URINE CAPACITY MEASURE: CPT

## 2022-01-01 PROCEDURE — 87350 HEPATITIS BE AG IA: CPT

## 2022-01-01 PROCEDURE — 90935 HEMODIALYSIS ONE EVALUATION: CPT

## 2022-01-01 PROCEDURE — 88304 TISSUE EXAM BY PATHOLOGIST: CPT

## 2022-01-01 PROCEDURE — 0QBP0ZZ EXCISION OF LEFT METATARSAL, OPEN APPROACH: ICD-10-PCS | Performed by: PODIATRIST

## 2022-01-01 PROCEDURE — 6370000000 HC RX 637 (ALT 250 FOR IP): Performed by: PHYSICIAN ASSISTANT

## 2022-01-01 PROCEDURE — 93005 ELECTROCARDIOGRAM TRACING: CPT | Performed by: STUDENT IN AN ORGANIZED HEALTH CARE EDUCATION/TRAINING PROGRAM

## 2022-01-01 PROCEDURE — 89220 SPUTUM SPECIMEN COLLECTION: CPT

## 2022-01-01 PROCEDURE — 97542 WHEELCHAIR MNGMENT TRAINING: CPT

## 2022-01-01 PROCEDURE — 2580000003 HC RX 258: Performed by: NURSE ANESTHETIST, CERTIFIED REGISTERED

## 2022-01-01 PROCEDURE — 84132 ASSAY OF SERUM POTASSIUM: CPT

## 2022-01-01 PROCEDURE — 93010 ELECTROCARDIOGRAM REPORT: CPT | Performed by: INTERNAL MEDICINE

## 2022-01-01 PROCEDURE — 3700000000 HC ANESTHESIA ATTENDED CARE: Performed by: ORTHOPAEDIC SURGERY

## 2022-01-01 PROCEDURE — 2500000003 HC RX 250 WO HCPCS: Performed by: PODIATRIST

## 2022-01-01 PROCEDURE — 86870 RBC ANTIBODY IDENTIFICATION: CPT

## 2022-01-01 PROCEDURE — 86850 RBC ANTIBODY SCREEN: CPT

## 2022-01-01 PROCEDURE — 6360000002 HC RX W HCPCS: Performed by: EMERGENCY MEDICINE

## 2022-01-01 PROCEDURE — 6370000000 HC RX 637 (ALT 250 FOR IP): Performed by: PSYCHIATRY & NEUROLOGY

## 2022-01-01 PROCEDURE — 99024 POSTOP FOLLOW-UP VISIT: CPT | Performed by: NURSE PRACTITIONER

## 2022-01-01 PROCEDURE — 2709999900 HC NON-CHARGEABLE SUPPLY: Performed by: INTERNAL MEDICINE

## 2022-01-01 PROCEDURE — 99024 POSTOP FOLLOW-UP VISIT: CPT | Performed by: SURGERY

## 2022-01-01 PROCEDURE — 85018 HEMOGLOBIN: CPT

## 2022-01-01 PROCEDURE — 31624 DX BRONCHOSCOPE/LAVAGE: CPT | Performed by: INTERNAL MEDICINE

## 2022-01-01 PROCEDURE — 3700000000 HC ANESTHESIA ATTENDED CARE: Performed by: SURGERY

## 2022-01-01 PROCEDURE — P9016 RBC LEUKOCYTES REDUCED: HCPCS

## 2022-01-01 PROCEDURE — 0DB98ZX EXCISION OF DUODENUM, VIA NATURAL OR ARTIFICIAL OPENING ENDOSCOPIC, DIAGNOSTIC: ICD-10-PCS | Performed by: INTERNAL MEDICINE

## 2022-01-01 PROCEDURE — 86922 COMPATIBILITY TEST ANTIGLOB: CPT

## 2022-01-01 PROCEDURE — 02HV33Z INSERTION OF INFUSION DEVICE INTO SUPERIOR VENA CAVA, PERCUTANEOUS APPROACH: ICD-10-PCS | Performed by: RADIOLOGY

## 2022-01-01 PROCEDURE — 31645 BRNCHSC W/THER ASPIR 1ST: CPT | Performed by: INTERNAL MEDICINE

## 2022-01-01 PROCEDURE — 87077 CULTURE AEROBIC IDENTIFY: CPT

## 2022-01-01 PROCEDURE — 87205 SMEAR GRAM STAIN: CPT

## 2022-01-01 PROCEDURE — 3609017700 HC EGD DILATION GASTRIC/DUODENAL STRICTURE: Performed by: INTERNAL MEDICINE

## 2022-01-01 PROCEDURE — 6370000000 HC RX 637 (ALT 250 FOR IP): Performed by: SURGERY

## 2022-01-01 PROCEDURE — 36556 INSERT NON-TUNNEL CV CATH: CPT

## 2022-01-01 PROCEDURE — 5A1955Z RESPIRATORY VENTILATION, GREATER THAN 96 CONSECUTIVE HOURS: ICD-10-PCS | Performed by: INTERNAL MEDICINE

## 2022-01-01 PROCEDURE — 3600000002 HC SURGERY LEVEL 2 BASE: Performed by: PODIATRIST

## 2022-01-01 PROCEDURE — 2580000003 HC RX 258: Performed by: ORTHOPAEDIC SURGERY

## 2022-01-01 PROCEDURE — 0DB18ZX EXCISION OF UPPER ESOPHAGUS, VIA NATURAL OR ARTIFICIAL OPENING ENDOSCOPIC, DIAGNOSTIC: ICD-10-PCS | Performed by: INTERNAL MEDICINE

## 2022-01-01 PROCEDURE — 71250 CT THORAX DX C-: CPT

## 2022-01-01 PROCEDURE — 2580000003 HC RX 258: Performed by: SURGERY

## 2022-01-01 PROCEDURE — 82977 ASSAY OF GGT: CPT

## 2022-01-01 PROCEDURE — 51701 INSERT BLADDER CATHETER: CPT

## 2022-01-01 PROCEDURE — 6360000002 HC RX W HCPCS: Performed by: ANESTHESIOLOGY

## 2022-01-01 PROCEDURE — 0JBK0ZZ EXCISION OF LEFT HAND SUBCUTANEOUS TISSUE AND FASCIA, OPEN APPROACH: ICD-10-PCS | Performed by: ORTHOPAEDIC SURGERY

## 2022-01-01 PROCEDURE — 51702 INSERT TEMP BLADDER CATH: CPT

## 2022-01-01 PROCEDURE — B4151ZZ FLUOROSCOPY OF INFERIOR MESENTERIC ARTERY USING LOW OSMOLAR CONTRAST: ICD-10-PCS | Performed by: RADIOLOGY

## 2022-01-01 PROCEDURE — 83735 ASSAY OF MAGNESIUM: CPT

## 2022-01-01 PROCEDURE — 87340 HEPATITIS B SURFACE AG IA: CPT

## 2022-01-01 PROCEDURE — 6360000002 HC RX W HCPCS: Performed by: PODIATRIST

## 2022-01-01 PROCEDURE — 1200000000 HC SEMI PRIVATE

## 2022-01-01 PROCEDURE — 86403 PARTICLE AGGLUT ANTBDY SCRN: CPT

## 2022-01-01 PROCEDURE — 93005 ELECTROCARDIOGRAM TRACING: CPT | Performed by: INTERNAL MEDICINE

## 2022-01-01 PROCEDURE — 99239 HOSP IP/OBS DSCHRG MGMT >30: CPT | Performed by: INTERNAL MEDICINE

## 2022-01-01 PROCEDURE — 93306 TTE W/DOPPLER COMPLETE: CPT

## 2022-01-01 PROCEDURE — C1769 GUIDE WIRE: HCPCS

## 2022-01-01 PROCEDURE — B4141ZZ FLUOROSCOPY OF SUPERIOR MESENTERIC ARTERY USING LOW OSMOLAR CONTRAST: ICD-10-PCS | Performed by: RADIOLOGY

## 2022-01-01 PROCEDURE — 84443 ASSAY THYROID STIM HORMONE: CPT

## 2022-01-01 PROCEDURE — 86140 C-REACTIVE PROTEIN: CPT

## 2022-01-01 PROCEDURE — 31500 INSERT EMERGENCY AIRWAY: CPT | Performed by: INTERNAL MEDICINE

## 2022-01-01 PROCEDURE — 73718 MRI LOWER EXTREMITY W/O DYE: CPT

## 2022-01-01 PROCEDURE — 6360000002 HC RX W HCPCS: Performed by: HOSPITALIST

## 2022-01-01 PROCEDURE — 2500000003 HC RX 250 WO HCPCS: Performed by: ANESTHESIOLOGY

## 2022-01-01 PROCEDURE — 7100000011 HC PHASE II RECOVERY - ADDTL 15 MIN: Performed by: INTERNAL MEDICINE

## 2022-01-01 PROCEDURE — 2580000003 HC RX 258: Performed by: HOSPITALIST

## 2022-01-01 PROCEDURE — G0328 FECAL BLOOD SCRN IMMUNOASSAY: HCPCS

## 2022-01-01 PROCEDURE — 3700000001 HC ADD 15 MINUTES (ANESTHESIA): Performed by: ORTHOPAEDIC SURGERY

## 2022-01-01 PROCEDURE — 36600 WITHDRAWAL OF ARTERIAL BLOOD: CPT

## 2022-01-01 PROCEDURE — 85610 PROTHROMBIN TIME: CPT

## 2022-01-01 PROCEDURE — 77001 FLUOROGUIDE FOR VEIN DEVICE: CPT

## 2022-01-01 PROCEDURE — 87186 SC STD MICRODIL/AGAR DIL: CPT

## 2022-01-01 PROCEDURE — 89051 BODY FLUID CELL COUNT: CPT

## 2022-01-01 PROCEDURE — 2500000003 HC RX 250 WO HCPCS: Performed by: EMERGENCY MEDICINE

## 2022-01-01 PROCEDURE — 74176 CT ABD & PELVIS W/O CONTRAST: CPT

## 2022-01-01 PROCEDURE — 93005 ELECTROCARDIOGRAM TRACING: CPT | Performed by: HOSPITALIST

## 2022-01-01 PROCEDURE — 2500000003 HC RX 250 WO HCPCS: Performed by: HOSPITALIST

## 2022-01-01 PROCEDURE — 97163 PT EVAL HIGH COMPLEX 45 MIN: CPT

## 2022-01-01 PROCEDURE — 85652 RBC SED RATE AUTOMATED: CPT

## 2022-01-01 PROCEDURE — 80053 COMPREHEN METABOLIC PANEL: CPT

## 2022-01-01 PROCEDURE — 3600000012 HC SURGERY LEVEL 2 ADDTL 15MIN: Performed by: SURGERY

## 2022-01-01 PROCEDURE — 2580000003 HC RX 258: Performed by: ANESTHESIOLOGY

## 2022-01-01 PROCEDURE — C8929 TTE W OR WO FOL WCON,DOPPLER: HCPCS

## 2022-01-01 PROCEDURE — 2709999900 HC NON-CHARGEABLE SUPPLY: Performed by: PODIATRIST

## 2022-01-01 PROCEDURE — 74240 X-RAY XM UPR GI TRC 1CNTRST: CPT

## 2022-01-01 PROCEDURE — 3700000001 HC ADD 15 MINUTES (ANESTHESIA): Performed by: PODIATRIST

## 2022-01-01 PROCEDURE — 87338 HPYLORI STOOL AG IA: CPT

## 2022-01-01 PROCEDURE — 2709999900 HC NON-CHARGEABLE SUPPLY: Performed by: ORTHOPAEDIC SURGERY

## 2022-01-01 PROCEDURE — 82607 VITAMIN B-12: CPT

## 2022-01-01 PROCEDURE — 85384 FIBRINOGEN ACTIVITY: CPT

## 2022-01-01 PROCEDURE — 74175 CTA ABDOMEN W/CONTRAST: CPT

## 2022-01-01 PROCEDURE — 0DB68ZX EXCISION OF STOMACH, VIA NATURAL OR ARTIFICIAL OPENING ENDOSCOPIC, DIAGNOSTIC: ICD-10-PCS | Performed by: INTERNAL MEDICINE

## 2022-01-01 PROCEDURE — 96361 HYDRATE IV INFUSION ADD-ON: CPT

## 2022-01-01 PROCEDURE — 85730 THROMBOPLASTIN TIME PARTIAL: CPT

## 2022-01-01 PROCEDURE — 86860 RBC ANTIBODY ELUTION: CPT

## 2022-01-01 PROCEDURE — C1726 CATH, BAL DIL, NON-VASCULAR: HCPCS | Performed by: INTERNAL MEDICINE

## 2022-01-01 PROCEDURE — 6370000000 HC RX 637 (ALT 250 FOR IP)

## 2022-01-01 PROCEDURE — 97606 NEG PRS WND THER DME>50 SQCM: CPT

## 2022-01-01 PROCEDURE — 86905 BLOOD TYPING RBC ANTIGENS: CPT

## 2022-01-01 PROCEDURE — 3600000002 HC SURGERY LEVEL 2 BASE: Performed by: SURGERY

## 2022-01-01 PROCEDURE — 92950 HEART/LUNG RESUSCITATION CPR: CPT

## 2022-01-01 PROCEDURE — 92610 EVALUATE SWALLOWING FUNCTION: CPT

## 2022-01-01 PROCEDURE — P9047 ALBUMIN (HUMAN), 25%, 50ML: HCPCS | Performed by: PODIATRIST

## 2022-01-01 PROCEDURE — 43800 PYLOROPLASTY: CPT | Performed by: SURGERY

## 2022-01-01 PROCEDURE — 82140 ASSAY OF AMMONIA: CPT

## 2022-01-01 PROCEDURE — 3609017100 HC EGD: Performed by: INTERNAL MEDICINE

## 2022-01-01 PROCEDURE — 74174 CTA ABD&PLVS W/CONTRAST: CPT

## 2022-01-01 PROCEDURE — C9113 INJ PANTOPRAZOLE SODIUM, VIA: HCPCS | Performed by: SURGERY

## 2022-01-01 PROCEDURE — 84134 ASSAY OF PREALBUMIN: CPT

## 2022-01-01 PROCEDURE — 86880 COOMBS TEST DIRECT: CPT

## 2022-01-01 PROCEDURE — 70450 CT HEAD/BRAIN W/O DYE: CPT

## 2022-01-01 PROCEDURE — 3600000012 HC SURGERY LEVEL 2 ADDTL 15MIN: Performed by: PODIATRIST

## 2022-01-01 PROCEDURE — 0DQ90ZZ REPAIR DUODENUM, OPEN APPROACH: ICD-10-PCS | Performed by: SURGERY

## 2022-01-01 PROCEDURE — 36247 INS CATH ABD/L-EXT ART 3RD: CPT

## 2022-01-01 PROCEDURE — 97165 OT EVAL LOW COMPLEX 30 MIN: CPT

## 2022-01-01 PROCEDURE — 0B918ZZ DRAINAGE OF TRACHEA, VIA NATURAL OR ARTIFICIAL OPENING ENDOSCOPIC: ICD-10-PCS | Performed by: INTERNAL MEDICINE

## 2022-01-01 PROCEDURE — 73620 X-RAY EXAM OF FOOT: CPT

## 2022-01-01 PROCEDURE — 97166 OT EVAL MOD COMPLEX 45 MIN: CPT

## 2022-01-01 PROCEDURE — 3700000000 HC ANESTHESIA ATTENDED CARE: Performed by: PODIATRIST

## 2022-01-01 PROCEDURE — 11044 DBRDMT BONE 1ST 20 SQ CM/<: CPT | Performed by: INTERNAL MEDICINE

## 2022-01-01 PROCEDURE — 36556 INSERT NON-TUNNEL CV CATH: CPT | Performed by: INTERNAL MEDICINE

## 2022-01-01 PROCEDURE — 97168 OT RE-EVAL EST PLAN CARE: CPT

## 2022-01-01 PROCEDURE — 49905 OMENTAL FLAP INTRA-ABDOM: CPT | Performed by: SURGERY

## 2022-01-01 PROCEDURE — 6360000002 HC RX W HCPCS: Performed by: RADIOLOGY

## 2022-01-01 PROCEDURE — 3600000002 HC SURGERY LEVEL 2 BASE: Performed by: ORTHOPAEDIC SURGERY

## 2022-01-01 PROCEDURE — 94002 VENT MGMT INPAT INIT DAY: CPT

## 2022-01-01 PROCEDURE — 37244 VASC EMBOLIZE/OCCLUDE BLEED: CPT

## 2022-01-01 PROCEDURE — C1752 CATH,HEMODIALYSIS,SHORT-TERM: HCPCS

## 2022-01-01 PROCEDURE — 2580000003 HC RX 258: Performed by: EMERGENCY MEDICINE

## 2022-01-01 PROCEDURE — 99233 SBSQ HOSP IP/OBS HIGH 50: CPT | Performed by: PSYCHIATRY & NEUROLOGY

## 2022-01-01 PROCEDURE — 3700000000 HC ANESTHESIA ATTENDED CARE: Performed by: INTERNAL MEDICINE

## 2022-01-01 PROCEDURE — 83690 ASSAY OF LIPASE: CPT

## 2022-01-01 PROCEDURE — C1729 CATH, DRAINAGE: HCPCS | Performed by: SURGERY

## 2022-01-01 PROCEDURE — 2709999900 HC NON-CHARGEABLE SUPPLY: Performed by: SURGERY

## 2022-01-01 PROCEDURE — 92523 SPEECH SOUND LANG COMPREHEN: CPT

## 2022-01-01 PROCEDURE — 97164 PT RE-EVAL EST PLAN CARE: CPT

## 2022-01-01 PROCEDURE — 31720 CLEARANCE OF AIRWAYS: CPT

## 2022-01-01 PROCEDURE — 88305 TISSUE EXAM BY PATHOLOGIST: CPT

## 2022-01-01 PROCEDURE — 73130 X-RAY EXAM OF HAND: CPT

## 2022-01-01 PROCEDURE — 82150 ASSAY OF AMYLASE: CPT

## 2022-01-01 PROCEDURE — 93005 ELECTROCARDIOGRAM TRACING: CPT | Performed by: EMERGENCY MEDICINE

## 2022-01-01 PROCEDURE — 96372 THER/PROPH/DIAG INJ SC/IM: CPT

## 2022-01-01 PROCEDURE — 0D768ZZ DILATION OF STOMACH, VIA NATURAL OR ARTIFICIAL OPENING ENDOSCOPIC: ICD-10-PCS | Performed by: INTERNAL MEDICINE

## 2022-01-01 PROCEDURE — 94150 VITAL CAPACITY TEST: CPT

## 2022-01-01 PROCEDURE — 86902 BLOOD TYPE ANTIGEN DONOR EA: CPT

## 2022-01-01 PROCEDURE — 81001 URINALYSIS AUTO W/SCOPE: CPT

## 2022-01-01 PROCEDURE — 3600000012 HC SURGERY LEVEL 2 ADDTL 15MIN: Performed by: ORTHOPAEDIC SURGERY

## 2022-01-01 PROCEDURE — 99255 IP/OBS CONSLTJ NEW/EST HI 80: CPT | Performed by: PSYCHIATRY & NEUROLOGY

## 2022-01-01 PROCEDURE — 04L33DZ OCCLUSION OF HEPATIC ARTERY WITH INTRALUMINAL DEVICE, PERCUTANEOUS APPROACH: ICD-10-PCS | Performed by: RADIOLOGY

## 2022-01-01 PROCEDURE — 36573 INSJ PICC RS&I 5 YR+: CPT

## 2022-01-01 PROCEDURE — 86706 HEP B SURFACE ANTIBODY: CPT

## 2022-01-01 PROCEDURE — 3700000001 HC ADD 15 MINUTES (ANESTHESIA): Performed by: SURGERY

## 2022-01-01 PROCEDURE — 74018 RADEX ABDOMEN 1 VIEW: CPT

## 2022-01-01 PROCEDURE — 2720000010 HC SURG SUPPLY STERILE: Performed by: SURGERY

## 2022-01-01 PROCEDURE — 87324 CLOSTRIDIUM AG IA: CPT

## 2022-01-01 PROCEDURE — C1751 CATH, INF, PER/CENT/MIDLINE: HCPCS

## 2022-01-01 PROCEDURE — 73218 MRI UPPER EXTREMITY W/O DYE: CPT

## 2022-01-01 PROCEDURE — 0BH18EZ INSERTION OF ENDOTRACHEAL AIRWAY INTO TRACHEA, VIA NATURAL OR ARTIFICIAL OPENING ENDOSCOPIC: ICD-10-PCS | Performed by: INTERNAL MEDICINE

## 2022-01-01 PROCEDURE — 10180 I&D COMPLEX PO WOUND INFCTJ: CPT | Performed by: ORTHOPAEDIC SURGERY

## 2022-01-01 PROCEDURE — 2580000003 HC RX 258: Performed by: STUDENT IN AN ORGANIZED HEALTH CARE EDUCATION/TRAINING PROGRAM

## 2022-01-01 PROCEDURE — 11043 DBRDMT MUSC&/FSCA 1ST 20/<: CPT | Performed by: INTERNAL MEDICINE

## 2022-01-01 PROCEDURE — 86704 HEP B CORE ANTIBODY TOTAL: CPT

## 2022-01-01 PROCEDURE — 97605 NEG PRS WND THER DME<=50SQCM: CPT

## 2022-01-01 PROCEDURE — 99254 IP/OBS CNSLTJ NEW/EST MOD 60: CPT | Performed by: SURGERY

## 2022-01-01 PROCEDURE — 3700000001 HC ADD 15 MINUTES (ANESTHESIA): Performed by: INTERNAL MEDICINE

## 2022-01-01 PROCEDURE — 99153 MOD SED SAME PHYS/QHP EA: CPT

## 2022-01-01 PROCEDURE — 82746 ASSAY OF FOLIC ACID SERUM: CPT

## 2022-01-01 PROCEDURE — 3609012400 HC EGD TRANSORAL BIOPSY SINGLE/MULTIPLE: Performed by: INTERNAL MEDICINE

## 2022-01-01 PROCEDURE — 2500000003 HC RX 250 WO HCPCS: Performed by: NURSE ANESTHETIST, CERTIFIED REGISTERED

## 2022-01-01 PROCEDURE — 84550 ASSAY OF BLOOD/URIC ACID: CPT

## 2022-01-01 PROCEDURE — 84145 PROCALCITONIN (PCT): CPT

## 2022-01-01 PROCEDURE — 44300 OPEN BOWEL TO SKIN: CPT | Performed by: SURGERY

## 2022-01-01 PROCEDURE — 6370000000 HC RX 637 (ALT 250 FOR IP): Performed by: EMERGENCY MEDICINE

## 2022-01-01 PROCEDURE — P9047 ALBUMIN (HUMAN), 25%, 50ML: HCPCS

## 2022-01-01 PROCEDURE — 0B9J8ZZ DRAINAGE OF LEFT LOWER LUNG LOBE, VIA NATURAL OR ARTIFICIAL OPENING ENDOSCOPIC: ICD-10-PCS | Performed by: INTERNAL MEDICINE

## 2022-01-01 PROCEDURE — 0J9K0ZZ DRAINAGE OF LEFT HAND SUBCUTANEOUS TISSUE AND FASCIA, OPEN APPROACH: ICD-10-PCS | Performed by: ORTHOPAEDIC SURGERY

## 2022-01-01 PROCEDURE — 06H033Z INSERTION OF INFUSION DEVICE INTO INFERIOR VENA CAVA, PERCUTANEOUS APPROACH: ICD-10-PCS | Performed by: RADIOLOGY

## 2022-01-01 PROCEDURE — 74230 X-RAY XM SWLNG FUNCJ C+: CPT

## 2022-01-01 PROCEDURE — 31622 DX BRONCHOSCOPE/WASH: CPT

## 2022-01-01 PROCEDURE — 87804 INFLUENZA ASSAY W/OPTIC: CPT

## 2022-01-01 PROCEDURE — 02HV33Z INSERTION OF INFUSION DEVICE INTO SUPERIOR VENA CAVA, PERCUTANEOUS APPROACH: ICD-10-PCS | Performed by: INTERNAL MEDICINE

## 2022-01-01 PROCEDURE — 7100000010 HC PHASE II RECOVERY - FIRST 15 MIN: Performed by: PODIATRIST

## 2022-01-01 PROCEDURE — 99152 MOD SED SAME PHYS/QHP 5/>YRS: CPT

## 2022-01-01 PROCEDURE — 83036 HEMOGLOBIN GLYCOSYLATED A1C: CPT

## 2022-01-01 PROCEDURE — 7100000011 HC PHASE II RECOVERY - ADDTL 15 MIN: Performed by: PODIATRIST

## 2022-01-01 PROCEDURE — 5A1D90Z PERFORMANCE OF URINARY FILTRATION, CONTINUOUS, GREATER THAN 18 HOURS PER DAY: ICD-10-PCS | Performed by: INTERNAL MEDICINE

## 2022-01-01 PROCEDURE — 97167 OT EVAL HIGH COMPLEX 60 MIN: CPT

## 2022-01-01 PROCEDURE — 76770 US EXAM ABDO BACK WALL COMP: CPT

## 2022-01-01 PROCEDURE — 99255 IP/OBS CONSLTJ NEW/EST HI 80: CPT | Performed by: INTERNAL MEDICINE

## 2022-01-01 PROCEDURE — 0DH67UZ INSERTION OF FEEDING DEVICE INTO STOMACH, VIA NATURAL OR ARTIFICIAL OPENING: ICD-10-PCS | Performed by: INTERNAL MEDICINE

## 2022-01-01 PROCEDURE — 87449 NOS EACH ORGANISM AG IA: CPT

## 2022-01-01 PROCEDURE — 76937 US GUIDE VASCULAR ACCESS: CPT

## 2022-01-01 PROCEDURE — 0B9D8ZZ DRAINAGE OF RIGHT MIDDLE LUNG LOBE, VIA NATURAL OR ARTIFICIAL OPENING ENDOSCOPIC: ICD-10-PCS | Performed by: INTERNAL MEDICINE

## 2022-01-01 RX ORDER — BUPIVACAINE HYDROCHLORIDE 5 MG/ML
INJECTION, SOLUTION EPIDURAL; INTRACAUDAL PRN
Status: DISCONTINUED | OUTPATIENT
Start: 2022-01-01 | End: 2022-01-01 | Stop reason: ALTCHOICE

## 2022-01-01 RX ORDER — LIDOCAINE HYDROCHLORIDE 40 MG/ML
SOLUTION TOPICAL
Status: DISPENSED
Start: 2022-01-01 | End: 2022-01-01

## 2022-01-01 RX ORDER — INSULIN GLARGINE 100 [IU]/ML
40 INJECTION, SOLUTION SUBCUTANEOUS 2 TIMES DAILY
Status: DISCONTINUED | OUTPATIENT
Start: 2022-01-01 | End: 2022-01-01

## 2022-01-01 RX ORDER — INSULIN GLARGINE 100 [IU]/ML
25 INJECTION, SOLUTION SUBCUTANEOUS NIGHTLY
Status: DISCONTINUED | OUTPATIENT
Start: 2022-01-01 | End: 2022-01-01 | Stop reason: HOSPADM

## 2022-01-01 RX ORDER — SODIUM CHLORIDE 9 MG/ML
INJECTION, SOLUTION INTRAVENOUS PRN
Status: DISCONTINUED | OUTPATIENT
Start: 2022-01-01 | End: 2022-01-01 | Stop reason: SDUPTHER

## 2022-01-01 RX ORDER — HEPARIN SODIUM 5000 [USP'U]/ML
5000 INJECTION, SOLUTION INTRAVENOUS; SUBCUTANEOUS EVERY 8 HOURS SCHEDULED
Status: DISCONTINUED | OUTPATIENT
Start: 2022-01-01 | End: 2022-01-01 | Stop reason: HOSPADM

## 2022-01-01 RX ORDER — MIDAZOLAM HYDROCHLORIDE 1 MG/ML
INJECTION INTRAMUSCULAR; INTRAVENOUS PRN
Status: DISCONTINUED | OUTPATIENT
Start: 2022-01-01 | End: 2022-01-01 | Stop reason: SDUPTHER

## 2022-01-01 RX ORDER — OXYCODONE HYDROCHLORIDE 5 MG/1
5 TABLET ORAL ONCE
Status: COMPLETED | OUTPATIENT
Start: 2022-01-01 | End: 2022-01-01

## 2022-01-01 RX ORDER — CHLORHEXIDINE GLUCONATE 0.12 MG/ML
15 RINSE ORAL 2 TIMES DAILY
Status: DISCONTINUED | OUTPATIENT
Start: 2022-01-01 | End: 2022-01-01

## 2022-01-01 RX ORDER — DILTIAZEM HYDROCHLORIDE 120 MG/1
120 CAPSULE, COATED, EXTENDED RELEASE ORAL DAILY
Qty: 30 CAPSULE | Refills: 3 | Status: ON HOLD
Start: 2022-01-01 | End: 2022-01-01 | Stop reason: HOSPADM

## 2022-01-01 RX ORDER — QUETIAPINE FUMARATE 25 MG/1
12.5 TABLET, FILM COATED ORAL 2 TIMES DAILY
Status: COMPLETED | OUTPATIENT
Start: 2022-01-01 | End: 2022-01-01

## 2022-01-01 RX ORDER — TIZANIDINE 4 MG/1
8 TABLET ORAL 2 TIMES DAILY
Status: DISCONTINUED | OUTPATIENT
Start: 2022-01-01 | End: 2022-01-01

## 2022-01-01 RX ORDER — SODIUM CHLORIDE 9 MG/ML
INJECTION, SOLUTION INTRAVENOUS CONTINUOUS
Status: ACTIVE | OUTPATIENT
Start: 2022-01-01 | End: 2022-01-01

## 2022-01-01 RX ORDER — FLUCONAZOLE 100 MG/1
200 TABLET ORAL ONCE
Status: COMPLETED | OUTPATIENT
Start: 2022-01-01 | End: 2022-01-01

## 2022-01-01 RX ORDER — PROCHLORPERAZINE MALEATE 5 MG/1
5 TABLET ORAL EVERY 6 HOURS PRN
Status: DISCONTINUED | OUTPATIENT
Start: 2022-01-01 | End: 2022-01-01 | Stop reason: HOSPADM

## 2022-01-01 RX ORDER — SUCRALFATE 1 G/1
1 TABLET ORAL 4 TIMES DAILY
Qty: 120 TABLET | Refills: 1 | Status: ON HOLD
Start: 2022-01-01 | End: 2022-01-01 | Stop reason: HOSPADM

## 2022-01-01 RX ORDER — OXYCODONE HYDROCHLORIDE 5 MG/1
10 TABLET ORAL EVERY 4 HOURS PRN
Status: DISCONTINUED | OUTPATIENT
Start: 2022-01-01 | End: 2022-01-01

## 2022-01-01 RX ORDER — FLUTICASONE PROPIONATE 50 MCG
2 SPRAY, SUSPENSION (ML) NASAL NIGHTLY
Status: DISCONTINUED | OUTPATIENT
Start: 2022-01-01 | End: 2022-01-01 | Stop reason: HOSPADM

## 2022-01-01 RX ORDER — CHOLECALCIFEROL (VITAMIN D3) 10 MCG
1 TABLET ORAL DAILY
Status: DISCONTINUED | OUTPATIENT
Start: 2022-01-01 | End: 2022-01-01 | Stop reason: HOSPADM

## 2022-01-01 RX ORDER — DIPHENHYDRAMINE HYDROCHLORIDE 50 MG/ML
12.5 INJECTION INTRAMUSCULAR; INTRAVENOUS
Status: DISCONTINUED | OUTPATIENT
Start: 2022-01-01 | End: 2022-01-01 | Stop reason: HOSPADM

## 2022-01-01 RX ORDER — FENTANYL CITRATE-0.9 % NACL/PF 10 MCG/ML
12.5-2 PLASTIC BAG, INJECTION (ML) INTRAVENOUS CONTINUOUS
Status: DISCONTINUED | OUTPATIENT
Start: 2022-01-01 | End: 2022-01-01

## 2022-01-01 RX ORDER — FENTANYL CITRATE 50 UG/ML
INJECTION, SOLUTION INTRAMUSCULAR; INTRAVENOUS
Status: DISPENSED
Start: 2022-01-01 | End: 2022-01-01

## 2022-01-01 RX ORDER — DEXAMETHASONE SODIUM PHOSPHATE 4 MG/ML
INJECTION, SOLUTION INTRA-ARTICULAR; INTRALESIONAL; INTRAMUSCULAR; INTRAVENOUS; SOFT TISSUE PRN
Status: DISCONTINUED | OUTPATIENT
Start: 2022-01-01 | End: 2022-01-01 | Stop reason: SDUPTHER

## 2022-01-01 RX ORDER — FENTANYL CITRATE 50 UG/ML
50 INJECTION, SOLUTION INTRAMUSCULAR; INTRAVENOUS
Status: DISCONTINUED | OUTPATIENT
Start: 2022-01-01 | End: 2022-01-01

## 2022-01-01 RX ORDER — MAGNESIUM HYDROXIDE 1200 MG/15ML
1000 LIQUID ORAL CONTINUOUS PRN
Status: DISCONTINUED | OUTPATIENT
Start: 2022-01-01 | End: 2022-01-01 | Stop reason: HOSPADM

## 2022-01-01 RX ORDER — SODIUM CHLORIDE 0.9 % (FLUSH) 0.9 %
10 SYRINGE (ML) INJECTION PRN
Status: CANCELLED | OUTPATIENT
Start: 2022-01-01

## 2022-01-01 RX ORDER — TIZANIDINE 4 MG/1
8 TABLET ORAL EVERY 6 HOURS PRN
Qty: 180 TABLET | Refills: 0
Start: 2022-01-01 | End: 2022-01-01 | Stop reason: SDUPTHER

## 2022-01-01 RX ORDER — OXYCODONE HYDROCHLORIDE 5 MG/1
5-10 TABLET ORAL EVERY 4 HOURS PRN
Qty: 30 TABLET | Refills: 0 | Status: SHIPPED | OUTPATIENT
Start: 2022-01-01 | End: 2022-01-01 | Stop reason: HOSPADM

## 2022-01-01 RX ORDER — ALBUMIN (HUMAN) 12.5 G/50ML
SOLUTION INTRAVENOUS
Status: COMPLETED
Start: 2022-01-01 | End: 2022-01-01

## 2022-01-01 RX ORDER — ALBUMIN (HUMAN) 12.5 G/50ML
12.5 SOLUTION INTRAVENOUS PRN
Status: DISCONTINUED | OUTPATIENT
Start: 2022-01-01 | End: 2022-01-01 | Stop reason: HOSPADM

## 2022-01-01 RX ORDER — GABAPENTIN 400 MG/1
400 CAPSULE ORAL 3 TIMES DAILY
Status: DISCONTINUED | OUTPATIENT
Start: 2022-01-01 | End: 2022-01-01

## 2022-01-01 RX ORDER — MIDAZOLAM HYDROCHLORIDE 1 MG/ML
2 INJECTION INTRAMUSCULAR; INTRAVENOUS EVERY 5 MIN PRN
Status: ACTIVE | OUTPATIENT
Start: 2022-01-01 | End: 2022-01-01

## 2022-01-01 RX ORDER — SUCRALFATE 1 G/1
1 TABLET ORAL EVERY 6 HOURS SCHEDULED
Status: DISCONTINUED | OUTPATIENT
Start: 2022-01-01 | End: 2022-01-01 | Stop reason: HOSPADM

## 2022-01-01 RX ORDER — MAGNESIUM SULFATE 1 G/100ML
1000 INJECTION INTRAVENOUS PRN
Status: DISCONTINUED | OUTPATIENT
Start: 2022-01-01 | End: 2022-01-01 | Stop reason: ALTCHOICE

## 2022-01-01 RX ORDER — ACETAMINOPHEN 650 MG
TABLET, EXTENDED RELEASE ORAL DAILY
Status: DISCONTINUED | OUTPATIENT
Start: 2022-01-01 | End: 2022-01-01 | Stop reason: HOSPADM

## 2022-01-01 RX ORDER — INSULIN GLARGINE 100 [IU]/ML
15 INJECTION, SOLUTION SUBCUTANEOUS NIGHTLY
Status: DISCONTINUED | OUTPATIENT
Start: 2022-01-01 | End: 2022-01-01

## 2022-01-01 RX ORDER — LINEZOLID 2 MG/ML
600 INJECTION, SOLUTION INTRAVENOUS EVERY 12 HOURS
Status: DISCONTINUED | OUTPATIENT
Start: 2022-01-01 | End: 2022-01-01

## 2022-01-01 RX ORDER — PROPOFOL 10 MG/ML
INJECTION, EMULSION INTRAVENOUS PRN
Status: DISCONTINUED | OUTPATIENT
Start: 2022-01-01 | End: 2022-01-01 | Stop reason: SDUPTHER

## 2022-01-01 RX ORDER — LIDOCAINE HYDROCHLORIDE 40 MG/ML
SOLUTION TOPICAL
Status: COMPLETED
Start: 2022-01-01 | End: 2022-01-01

## 2022-01-01 RX ORDER — LOPERAMIDE HYDROCHLORIDE 2 MG/1
2 CAPSULE ORAL 4 TIMES DAILY PRN
Qty: 30 CAPSULE | Refills: 0
Start: 2022-01-01 | End: 2022-01-01

## 2022-01-01 RX ORDER — PANTOPRAZOLE SODIUM 40 MG/10ML
40 INJECTION, POWDER, LYOPHILIZED, FOR SOLUTION INTRAVENOUS DAILY
Status: DISCONTINUED | OUTPATIENT
Start: 2022-01-01 | End: 2022-01-01

## 2022-01-01 RX ORDER — MIDAZOLAM HYDROCHLORIDE 5 MG/ML
INJECTION INTRAMUSCULAR; INTRAVENOUS PRN
Status: DISCONTINUED | OUTPATIENT
Start: 2022-01-01 | End: 2022-01-01 | Stop reason: ALTCHOICE

## 2022-01-01 RX ORDER — SODIUM CHLORIDE 9 MG/ML
INJECTION, SOLUTION INTRAVENOUS CONTINUOUS
Status: DISCONTINUED | OUTPATIENT
Start: 2022-01-01 | End: 2022-01-01

## 2022-01-01 RX ORDER — TRAZODONE HYDROCHLORIDE 50 MG/1
50 TABLET ORAL NIGHTLY
Status: DISCONTINUED | OUTPATIENT
Start: 2022-01-01 | End: 2022-01-01 | Stop reason: HOSPADM

## 2022-01-01 RX ORDER — MAGNESIUM HYDROXIDE/ALUMINUM HYDROXICE/SIMETHICONE 120; 1200; 1200 MG/30ML; MG/30ML; MG/30ML
30 SUSPENSION ORAL EVERY 6 HOURS PRN
Status: DISCONTINUED | OUTPATIENT
Start: 2022-01-01 | End: 2022-01-01 | Stop reason: HOSPADM

## 2022-01-01 RX ORDER — ALBUMIN (HUMAN) 12.5 G/50ML
25 SOLUTION INTRAVENOUS ONCE
Status: COMPLETED | OUTPATIENT
Start: 2022-01-01 | End: 2022-01-01

## 2022-01-01 RX ORDER — SODIUM CHLORIDE 9 MG/ML
INJECTION, SOLUTION INTRAVENOUS CONTINUOUS PRN
Status: DISCONTINUED | OUTPATIENT
Start: 2022-01-01 | End: 2022-01-01 | Stop reason: SDUPTHER

## 2022-01-01 RX ORDER — LABETALOL HYDROCHLORIDE 5 MG/ML
5 INJECTION, SOLUTION INTRAVENOUS EVERY 10 MIN PRN
Status: DISCONTINUED | OUTPATIENT
Start: 2022-01-01 | End: 2022-01-01 | Stop reason: HOSPADM

## 2022-01-01 RX ORDER — ONDANSETRON 2 MG/ML
INJECTION INTRAMUSCULAR; INTRAVENOUS PRN
Status: DISCONTINUED | OUTPATIENT
Start: 2022-01-01 | End: 2022-01-01 | Stop reason: SDUPTHER

## 2022-01-01 RX ORDER — INSULIN GLARGINE 100 [IU]/ML
55 INJECTION, SOLUTION SUBCUTANEOUS 2 TIMES DAILY
Status: DISCONTINUED | OUTPATIENT
Start: 2022-01-01 | End: 2022-01-01

## 2022-01-01 RX ORDER — LIDOCAINE HYDROCHLORIDE 10 MG/ML
5 INJECTION, SOLUTION EPIDURAL; INFILTRATION; INTRACAUDAL; PERINEURAL ONCE
Status: DISCONTINUED | OUTPATIENT
Start: 2022-01-01 | End: 2022-01-01

## 2022-01-01 RX ORDER — LIDOCAINE HYDROCHLORIDE 10 MG/ML
INJECTION, SOLUTION EPIDURAL; INFILTRATION; INTRACAUDAL; PERINEURAL PRN
Status: DISCONTINUED | OUTPATIENT
Start: 2022-01-01 | End: 2022-01-01 | Stop reason: ALTCHOICE

## 2022-01-01 RX ORDER — GABAPENTIN 100 MG/1
200 CAPSULE ORAL 3 TIMES DAILY
Status: DISCONTINUED | OUTPATIENT
Start: 2022-01-01 | End: 2022-01-01 | Stop reason: HOSPADM

## 2022-01-01 RX ORDER — 0.9 % SODIUM CHLORIDE 0.9 %
1000 INTRAVENOUS SOLUTION INTRAVENOUS ONCE
Status: COMPLETED | OUTPATIENT
Start: 2022-01-01 | End: 2022-01-01

## 2022-01-01 RX ORDER — SODIUM CHLORIDE 9 MG/ML
INJECTION, SOLUTION INTRAVENOUS CONTINUOUS
Status: DISCONTINUED | OUTPATIENT
Start: 2022-01-01 | End: 2022-01-01 | Stop reason: SDUPTHER

## 2022-01-01 RX ORDER — OXYCODONE HYDROCHLORIDE AND ACETAMINOPHEN 5; 325 MG/1; MG/1
2 TABLET ORAL EVERY 4 HOURS PRN
Status: DISCONTINUED | OUTPATIENT
Start: 2022-01-01 | End: 2022-01-01

## 2022-01-01 RX ORDER — LABETALOL HYDROCHLORIDE 5 MG/ML
10 INJECTION, SOLUTION INTRAVENOUS
Status: DISCONTINUED | OUTPATIENT
Start: 2022-01-01 | End: 2022-01-01 | Stop reason: HOSPADM

## 2022-01-01 RX ORDER — LIDOCAINE 4 G/G
1 PATCH TOPICAL DAILY
Status: DISCONTINUED | OUTPATIENT
Start: 2022-01-01 | End: 2022-01-01

## 2022-01-01 RX ORDER — M-VIT,TX,IRON,MINS/CALC/FOLIC 27MG-0.4MG
1 TABLET ORAL DAILY
Qty: 30 TABLET | Refills: 0
Start: 2022-01-01

## 2022-01-01 RX ORDER — POTASSIUM CHLORIDE 20 MEQ/1
40 TABLET, EXTENDED RELEASE ORAL ONCE
Status: DISCONTINUED | OUTPATIENT
Start: 2022-01-01 | End: 2022-01-01 | Stop reason: HOSPADM

## 2022-01-01 RX ORDER — ONDANSETRON 2 MG/ML
4 INJECTION INTRAMUSCULAR; INTRAVENOUS EVERY 6 HOURS PRN
Status: DISCONTINUED | OUTPATIENT
Start: 2022-01-01 | End: 2022-01-01 | Stop reason: HOSPADM

## 2022-01-01 RX ORDER — FENTANYL CITRATE 50 UG/ML
INJECTION, SOLUTION INTRAMUSCULAR; INTRAVENOUS PRN
Status: DISCONTINUED | OUTPATIENT
Start: 2022-01-01 | End: 2022-01-01 | Stop reason: ALTCHOICE

## 2022-01-01 RX ORDER — HEPARIN SODIUM 1000 [USP'U]/ML
4000 INJECTION, SOLUTION INTRAVENOUS; SUBCUTANEOUS PRN
Status: DISCONTINUED | OUTPATIENT
Start: 2022-01-01 | End: 2022-01-01

## 2022-01-01 RX ORDER — OLANZAPINE 5 MG/1
5 TABLET ORAL NIGHTLY
Status: DISCONTINUED | OUTPATIENT
Start: 2022-01-01 | End: 2022-01-01

## 2022-01-01 RX ORDER — METOCLOPRAMIDE 10 MG/1
10 TABLET ORAL
Qty: 120 TABLET | Refills: 3
Start: 2022-01-01

## 2022-01-01 RX ORDER — SODIUM CHLORIDE 9 MG/ML
INJECTION, SOLUTION INTRAVENOUS PRN
Status: DISCONTINUED | OUTPATIENT
Start: 2022-01-01 | End: 2022-01-01

## 2022-01-01 RX ORDER — OXYCODONE HYDROCHLORIDE AND ACETAMINOPHEN 5; 325 MG/1; MG/1
1 TABLET ORAL EVERY 4 HOURS PRN
Status: DISCONTINUED | OUTPATIENT
Start: 2022-01-01 | End: 2022-01-01 | Stop reason: HOSPADM

## 2022-01-01 RX ORDER — SODIUM CHLORIDE 0.9 % (FLUSH) 0.9 %
5-40 SYRINGE (ML) INJECTION EVERY 12 HOURS SCHEDULED
Status: DISCONTINUED | OUTPATIENT
Start: 2022-01-01 | End: 2022-01-01 | Stop reason: HOSPADM

## 2022-01-01 RX ORDER — OXYCODONE HYDROCHLORIDE 5 MG/1
10 TABLET ORAL PRN
Status: DISCONTINUED | OUTPATIENT
Start: 2022-01-01 | End: 2022-01-01 | Stop reason: HOSPADM

## 2022-01-01 RX ORDER — ONDANSETRON 4 MG/1
4 TABLET, ORALLY DISINTEGRATING ORAL EVERY 8 HOURS PRN
Status: DISCONTINUED | OUTPATIENT
Start: 2022-01-01 | End: 2022-01-01 | Stop reason: HOSPADM

## 2022-01-01 RX ORDER — SODIUM CHLORIDE 450 MG/100ML
INJECTION, SOLUTION INTRAVENOUS CONTINUOUS
Status: DISCONTINUED | OUTPATIENT
Start: 2022-01-01 | End: 2022-01-01

## 2022-01-01 RX ORDER — SENNA PLUS 8.6 MG/1
2 TABLET ORAL 2 TIMES DAILY
Status: DISCONTINUED | OUTPATIENT
Start: 2022-01-01 | End: 2022-01-01

## 2022-01-01 RX ORDER — INSULIN DEGLUDEC 200 U/ML
INJECTION, SOLUTION SUBCUTANEOUS
Qty: 9 ML | Refills: 0 | Status: ON HOLD | OUTPATIENT
Start: 2022-01-01 | End: 2022-01-01 | Stop reason: HOSPADM

## 2022-01-01 RX ORDER — METOPROLOL SUCCINATE 50 MG/1
50 TABLET, EXTENDED RELEASE ORAL 2 TIMES DAILY
Qty: 30 TABLET | Refills: 3
Start: 2022-01-01 | End: 2022-01-01

## 2022-01-01 RX ORDER — FENTANYL CITRATE 50 UG/ML
50 INJECTION, SOLUTION INTRAMUSCULAR; INTRAVENOUS EVERY 5 MIN PRN
Status: DISCONTINUED | OUTPATIENT
Start: 2022-01-01 | End: 2022-01-01 | Stop reason: HOSPADM

## 2022-01-01 RX ORDER — TRAZODONE HYDROCHLORIDE 50 MG/1
100 TABLET ORAL NIGHTLY
Status: DISCONTINUED | OUTPATIENT
Start: 2022-01-01 | End: 2022-01-01

## 2022-01-01 RX ORDER — SODIUM CHLORIDE 9 MG/ML
25 INJECTION, SOLUTION INTRAVENOUS PRN
Status: DISCONTINUED | OUTPATIENT
Start: 2022-01-01 | End: 2022-01-01 | Stop reason: HOSPADM

## 2022-01-01 RX ORDER — HYDROMORPHONE HYDROCHLORIDE 2 MG/1
2 TABLET ORAL
Status: DISCONTINUED | OUTPATIENT
Start: 2022-01-01 | End: 2022-01-01

## 2022-01-01 RX ORDER — MAGNESIUM SULFATE 1 G/100ML
1000 INJECTION INTRAVENOUS PRN
Status: DISCONTINUED | OUTPATIENT
Start: 2022-01-01 | End: 2022-01-01

## 2022-01-01 RX ORDER — QUETIAPINE FUMARATE 25 MG/1
50 TABLET, FILM COATED ORAL NIGHTLY
Status: COMPLETED | OUTPATIENT
Start: 2022-01-01 | End: 2022-01-01

## 2022-01-01 RX ORDER — OXYCODONE HYDROCHLORIDE AND ACETAMINOPHEN 5; 325 MG/1; MG/1
1 TABLET ORAL EVERY 4 HOURS PRN
Status: DISCONTINUED | OUTPATIENT
Start: 2022-01-01 | End: 2022-01-01

## 2022-01-01 RX ORDER — INSULIN GLARGINE 100 [IU]/ML
10 INJECTION, SOLUTION SUBCUTANEOUS ONCE
Status: COMPLETED | OUTPATIENT
Start: 2022-01-01 | End: 2022-01-01

## 2022-01-01 RX ORDER — CALCIUM CHLORIDE 100 MG/ML
INJECTION INTRAVENOUS; INTRAVENTRICULAR DAILY PRN
Status: COMPLETED | OUTPATIENT
Start: 2022-01-01 | End: 2022-01-01

## 2022-01-01 RX ORDER — SODIUM PHOSPHATE,MONO-DIBASIC 19G-7G/118
1 ENEMA (ML) RECTAL ONCE
Status: COMPLETED | OUTPATIENT
Start: 2022-01-01 | End: 2022-01-01

## 2022-01-01 RX ORDER — INSULIN DEGLUDEC 200 U/ML
INJECTION, SOLUTION SUBCUTANEOUS
Qty: 9 ML | Refills: 0 | Status: SHIPPED | OUTPATIENT
Start: 2022-01-01 | End: 2022-01-01 | Stop reason: SDUPTHER

## 2022-01-01 RX ORDER — GABAPENTIN 100 MG/1
100 CAPSULE ORAL NIGHTLY
Status: DISCONTINUED | OUTPATIENT
Start: 2022-01-01 | End: 2022-01-01

## 2022-01-01 RX ORDER — FUROSEMIDE 10 MG/ML
40 INJECTION INTRAMUSCULAR; INTRAVENOUS ONCE
Status: COMPLETED | OUTPATIENT
Start: 2022-01-01 | End: 2022-01-01

## 2022-01-01 RX ORDER — LABETALOL HYDROCHLORIDE 5 MG/ML
10 INJECTION, SOLUTION INTRAVENOUS EVERY 6 HOURS PRN
Status: DISCONTINUED | OUTPATIENT
Start: 2022-01-01 | End: 2022-01-01

## 2022-01-01 RX ORDER — EPINEPHRINE 0.1 MG/ML
SYRINGE (ML) INJECTION DAILY PRN
Status: COMPLETED | OUTPATIENT
Start: 2022-01-01 | End: 2022-01-01

## 2022-01-01 RX ORDER — GABAPENTIN 100 MG/1
100 CAPSULE ORAL 3 TIMES DAILY
Status: DISCONTINUED | OUTPATIENT
Start: 2022-01-01 | End: 2022-01-01

## 2022-01-01 RX ORDER — CARVEDILOL 6.25 MG/1
6.25 TABLET ORAL 2 TIMES DAILY WITH MEALS
Status: DISCONTINUED | OUTPATIENT
Start: 2022-01-01 | End: 2022-01-01

## 2022-01-01 RX ORDER — DIMETHICONE, OXYBENZONE, AND PADIMATE O 2; 2.5; 6.6 G/100G; G/100G; G/100G
STICK TOPICAL
Status: COMPLETED
Start: 2022-01-01 | End: 2022-01-01

## 2022-01-01 RX ORDER — PANTOPRAZOLE SODIUM 40 MG/10ML
40 INJECTION, POWDER, LYOPHILIZED, FOR SOLUTION INTRAVENOUS 2 TIMES DAILY
Status: DISCONTINUED | OUTPATIENT
Start: 2022-01-01 | End: 2022-01-01 | Stop reason: HOSPADM

## 2022-01-01 RX ORDER — SODIUM CHLORIDE 0.9 % (FLUSH) 0.9 %
5-40 SYRINGE (ML) INJECTION PRN
Status: DISCONTINUED | OUTPATIENT
Start: 2022-01-01 | End: 2022-01-01 | Stop reason: HOSPADM

## 2022-01-01 RX ORDER — ONDANSETRON 2 MG/ML
4 INJECTION INTRAMUSCULAR; INTRAVENOUS
Status: DISCONTINUED | OUTPATIENT
Start: 2022-01-01 | End: 2022-01-01 | Stop reason: HOSPADM

## 2022-01-01 RX ORDER — PAROXETINE 10 MG/1
20 TABLET, FILM COATED ORAL EVERY MORNING
Qty: 90 TABLET | Refills: 0
Start: 2022-01-01

## 2022-01-01 RX ORDER — FUROSEMIDE 40 MG/1
40 TABLET ORAL
Status: DISCONTINUED | OUTPATIENT
Start: 2022-01-01 | End: 2022-01-01

## 2022-01-01 RX ORDER — HEPARIN SODIUM 10000 [USP'U]/100ML
1000 INJECTION, SOLUTION INTRAVENOUS CONTINUOUS
Status: DISCONTINUED | OUTPATIENT
Start: 2022-01-01 | End: 2022-01-01

## 2022-01-01 RX ORDER — FUROSEMIDE 40 MG/1
40 TABLET ORAL DAILY
Status: DISCONTINUED | OUTPATIENT
Start: 2022-01-01 | End: 2022-01-01

## 2022-01-01 RX ORDER — TIZANIDINE 4 MG/1
4 TABLET ORAL 3 TIMES DAILY
Status: DISCONTINUED | OUTPATIENT
Start: 2022-01-01 | End: 2022-01-01 | Stop reason: HOSPADM

## 2022-01-01 RX ORDER — NICOTINE POLACRILEX 4 MG
15 LOZENGE BUCCAL PRN
Status: DISCONTINUED | OUTPATIENT
Start: 2022-01-01 | End: 2022-01-01 | Stop reason: SDUPTHER

## 2022-01-01 RX ORDER — LIDOCAINE HYDROCHLORIDE 20 MG/ML
INJECTION, SOLUTION INFILTRATION; PERINEURAL PRN
Status: DISCONTINUED | OUTPATIENT
Start: 2022-01-01 | End: 2022-01-01 | Stop reason: SDUPTHER

## 2022-01-01 RX ORDER — OXYCODONE HYDROCHLORIDE 5 MG/1
5 TABLET ORAL EVERY 4 HOURS PRN
Status: DISCONTINUED | OUTPATIENT
Start: 2022-01-01 | End: 2022-01-01

## 2022-01-01 RX ORDER — FLUCONAZOLE 100 MG/1
100 TABLET ORAL DAILY
Status: DISCONTINUED | OUTPATIENT
Start: 2022-01-01 | End: 2022-01-01 | Stop reason: HOSPADM

## 2022-01-01 RX ORDER — POLYETHYLENE GLYCOL 3350 17 G/17G
17 POWDER, FOR SOLUTION ORAL DAILY PRN
Qty: 30 EACH | Refills: 0
Start: 2022-01-01 | End: 2022-01-01

## 2022-01-01 RX ORDER — CARVEDILOL 6.25 MG/1
12.5 TABLET ORAL 2 TIMES DAILY WITH MEALS
Status: DISCONTINUED | OUTPATIENT
Start: 2022-01-01 | End: 2022-01-01

## 2022-01-01 RX ORDER — DOXYCYCLINE HYCLATE 100 MG
100 TABLET ORAL 2 TIMES DAILY
Status: DISPENSED | OUTPATIENT
Start: 2022-01-01 | End: 2022-01-01

## 2022-01-01 RX ORDER — HEPARIN SODIUM 1000 [USP'U]/ML
2000 INJECTION, SOLUTION INTRAVENOUS; SUBCUTANEOUS ONCE
Status: COMPLETED | OUTPATIENT
Start: 2022-01-01 | End: 2022-01-01

## 2022-01-01 RX ORDER — SODIUM CHLORIDE, SODIUM LACTATE, POTASSIUM CHLORIDE, CALCIUM CHLORIDE 600; 310; 30; 20 MG/100ML; MG/100ML; MG/100ML; MG/100ML
INJECTION, SOLUTION INTRAVENOUS CONTINUOUS
Status: CANCELLED | OUTPATIENT
Start: 2022-01-01

## 2022-01-01 RX ORDER — NICOTINE POLACRILEX 4 MG
15 LOZENGE BUCCAL PRN
Status: DISCONTINUED | OUTPATIENT
Start: 2022-01-01 | End: 2022-01-01 | Stop reason: HOSPADM

## 2022-01-01 RX ORDER — PAROXETINE HYDROCHLORIDE 20 MG/1
10 TABLET, FILM COATED ORAL EVERY MORNING
Status: DISCONTINUED | OUTPATIENT
Start: 2022-01-01 | End: 2022-01-01

## 2022-01-01 RX ORDER — OXYCODONE HYDROCHLORIDE 5 MG/1
5 TABLET ORAL EVERY 4 HOURS PRN
Status: DISCONTINUED | OUTPATIENT
Start: 2022-01-01 | End: 2022-01-01 | Stop reason: HOSPADM

## 2022-01-01 RX ORDER — NIFEDIPINE 30 MG/1
30 TABLET, EXTENDED RELEASE ORAL 2 TIMES DAILY
Status: DISCONTINUED | OUTPATIENT
Start: 2022-01-01 | End: 2022-01-01

## 2022-01-01 RX ORDER — QUETIAPINE FUMARATE 25 MG/1
25 TABLET, FILM COATED ORAL 2 TIMES DAILY
Status: DISCONTINUED | OUTPATIENT
Start: 2022-01-01 | End: 2022-01-01

## 2022-01-01 RX ORDER — NIFEDIPINE 30 MG/1
30 TABLET, FILM COATED, EXTENDED RELEASE ORAL 2 TIMES DAILY
Status: DISCONTINUED | OUTPATIENT
Start: 2022-01-01 | End: 2022-01-01 | Stop reason: SDUPTHER

## 2022-01-01 RX ORDER — POLYETHYLENE GLYCOL 3350 17 G/17G
17 POWDER, FOR SOLUTION ORAL DAILY
Qty: 30 EACH | Refills: 0
Start: 2022-01-01 | End: 2022-01-01

## 2022-01-01 RX ORDER — PANTOPRAZOLE SODIUM 40 MG/1
40 TABLET, DELAYED RELEASE ORAL
Qty: 60 TABLET | Refills: 1
Start: 2022-01-01

## 2022-01-01 RX ORDER — DIGOXIN 0.25 MG/ML
INJECTION INTRAMUSCULAR; INTRAVENOUS
Status: COMPLETED
Start: 2022-01-01 | End: 2022-01-01

## 2022-01-01 RX ORDER — LANOLIN ALCOHOL/MO/W.PET/CERES
400 CREAM (GRAM) TOPICAL 2 TIMES DAILY
Status: DISCONTINUED | OUTPATIENT
Start: 2022-01-01 | End: 2022-01-01 | Stop reason: SINTOL

## 2022-01-01 RX ORDER — SODIUM CHLORIDE 0.9 % (FLUSH) 0.9 %
5-40 SYRINGE (ML) INJECTION EVERY 12 HOURS SCHEDULED
Status: DISCONTINUED | OUTPATIENT
Start: 2022-01-01 | End: 2022-01-01

## 2022-01-01 RX ORDER — PAROXETINE HYDROCHLORIDE 20 MG/1
20 TABLET, FILM COATED ORAL DAILY
Status: DISCONTINUED | OUTPATIENT
Start: 2022-01-01 | End: 2022-01-01 | Stop reason: HOSPADM

## 2022-01-01 RX ORDER — INSULIN LISPRO 100 [IU]/ML
0-18 INJECTION, SOLUTION INTRAVENOUS; SUBCUTANEOUS
Status: DISCONTINUED | OUTPATIENT
Start: 2022-01-01 | End: 2022-01-01 | Stop reason: HOSPADM

## 2022-01-01 RX ORDER — FENTANYL CITRATE 50 UG/ML
INJECTION, SOLUTION INTRAMUSCULAR; INTRAVENOUS PRN
Status: DISCONTINUED | OUTPATIENT
Start: 2022-01-01 | End: 2022-01-01 | Stop reason: SDUPTHER

## 2022-01-01 RX ORDER — PANTOPRAZOLE SODIUM 40 MG/10ML
40 INJECTION, POWDER, LYOPHILIZED, FOR SOLUTION INTRAVENOUS 2 TIMES DAILY
Status: DISCONTINUED | OUTPATIENT
Start: 2022-01-01 | End: 2022-01-01

## 2022-01-01 RX ORDER — FENTANYL CITRATE 50 UG/ML
50 INJECTION, SOLUTION INTRAMUSCULAR; INTRAVENOUS EVERY 5 MIN PRN
Status: COMPLETED | OUTPATIENT
Start: 2022-01-01 | End: 2022-01-01

## 2022-01-01 RX ORDER — INSULIN GLARGINE 100 [IU]/ML
5 INJECTION, SOLUTION SUBCUTANEOUS ONCE
Status: COMPLETED | OUTPATIENT
Start: 2022-01-01 | End: 2022-01-01

## 2022-01-01 RX ORDER — DEXTROSE MONOHYDRATE 25 G/50ML
12.5 INJECTION, SOLUTION INTRAVENOUS PRN
Status: DISCONTINUED | OUTPATIENT
Start: 2022-01-01 | End: 2022-01-01 | Stop reason: SDUPTHER

## 2022-01-01 RX ORDER — GABAPENTIN 300 MG/1
300 CAPSULE ORAL 3 TIMES DAILY
Status: DISCONTINUED | OUTPATIENT
Start: 2022-01-01 | End: 2022-01-01

## 2022-01-01 RX ORDER — CASTOR OIL AND BALSAM, PERU 788; 87 MG/G; MG/G
OINTMENT TOPICAL 2 TIMES DAILY
Status: DISCONTINUED | OUTPATIENT
Start: 2022-01-01 | End: 2022-01-01

## 2022-01-01 RX ORDER — LANOLIN ALCOHOL/MO/W.PET/CERES
800 CREAM (GRAM) TOPICAL 3 TIMES DAILY
Status: DISCONTINUED | OUTPATIENT
Start: 2022-01-01 | End: 2022-01-01

## 2022-01-01 RX ORDER — HYDRALAZINE HYDROCHLORIDE 20 MG/ML
20 INJECTION INTRAMUSCULAR; INTRAVENOUS EVERY 6 HOURS PRN
Status: DISCONTINUED | OUTPATIENT
Start: 2022-01-01 | End: 2022-01-01

## 2022-01-01 RX ORDER — HEPARIN SODIUM 1000 [USP'U]/ML
2600 INJECTION, SOLUTION INTRAVENOUS; SUBCUTANEOUS PRN
Status: DISCONTINUED | OUTPATIENT
Start: 2022-01-01 | End: 2022-01-01 | Stop reason: HOSPADM

## 2022-01-01 RX ORDER — GABAPENTIN 100 MG/1
200 CAPSULE ORAL 3 TIMES DAILY
Qty: 180 CAPSULE | Refills: 0
Start: 2022-01-01 | End: 2022-01-01

## 2022-01-01 RX ORDER — INSULIN GLARGINE 100 [IU]/ML
30 INJECTION, SOLUTION SUBCUTANEOUS 2 TIMES DAILY
Status: DISCONTINUED | OUTPATIENT
Start: 2022-01-01 | End: 2022-01-01

## 2022-01-01 RX ORDER — SODIUM CHLORIDE 9 MG/ML
INJECTION, SOLUTION INTRAVENOUS PRN
Status: COMPLETED | OUTPATIENT
Start: 2022-01-01 | End: 2022-01-01

## 2022-01-01 RX ORDER — HEPARIN SODIUM 5000 [USP'U]/ML
5000 INJECTION, SOLUTION INTRAVENOUS; SUBCUTANEOUS EVERY 8 HOURS SCHEDULED
Status: DISCONTINUED | OUTPATIENT
Start: 2022-01-01 | End: 2022-01-01

## 2022-01-01 RX ORDER — SODIUM CHLORIDE, SODIUM LACTATE, POTASSIUM CHLORIDE, CALCIUM CHLORIDE 600; 310; 30; 20 MG/100ML; MG/100ML; MG/100ML; MG/100ML
INJECTION, SOLUTION INTRAVENOUS CONTINUOUS PRN
Status: DISCONTINUED | OUTPATIENT
Start: 2022-01-01 | End: 2022-01-01 | Stop reason: SDUPTHER

## 2022-01-01 RX ORDER — HYDROMORPHONE HYDROCHLORIDE 2 MG/1
1 TABLET ORAL
Status: DISCONTINUED | OUTPATIENT
Start: 2022-01-01 | End: 2022-01-01

## 2022-01-01 RX ORDER — CETIRIZINE HYDROCHLORIDE 10 MG/1
5 TABLET ORAL DAILY
Status: DISCONTINUED | OUTPATIENT
Start: 2022-01-01 | End: 2022-01-01

## 2022-01-01 RX ORDER — METOPROLOL SUCCINATE 25 MG/1
25 TABLET, EXTENDED RELEASE ORAL 2 TIMES DAILY
Status: DISCONTINUED | OUTPATIENT
Start: 2022-01-01 | End: 2022-01-01 | Stop reason: HOSPADM

## 2022-01-01 RX ORDER — SODIUM CHLORIDE 0.9 % (FLUSH) 0.9 %
5-40 SYRINGE (ML) INJECTION PRN
Status: DISCONTINUED | OUTPATIENT
Start: 2022-01-01 | End: 2022-01-01

## 2022-01-01 RX ORDER — INSULIN GLARGINE 100 [IU]/ML
17 INJECTION, SOLUTION SUBCUTANEOUS NIGHTLY
Status: DISCONTINUED | OUTPATIENT
Start: 2022-01-01 | End: 2022-01-01

## 2022-01-01 RX ORDER — HEPARIN SODIUM 1000 [USP'U]/ML
4000 INJECTION, SOLUTION INTRAVENOUS; SUBCUTANEOUS ONCE
Status: COMPLETED | OUTPATIENT
Start: 2022-01-01 | End: 2022-01-01

## 2022-01-01 RX ORDER — INSULIN GLARGINE 100 [IU]/ML
20 INJECTION, SOLUTION SUBCUTANEOUS NIGHTLY
Status: DISCONTINUED | OUTPATIENT
Start: 2022-01-01 | End: 2022-01-01

## 2022-01-01 RX ORDER — DIGOXIN 0.25 MG/ML
125 INJECTION INTRAMUSCULAR; INTRAVENOUS ONCE
Status: COMPLETED | OUTPATIENT
Start: 2022-01-01 | End: 2022-01-01

## 2022-01-01 RX ORDER — TAMSULOSIN HYDROCHLORIDE 0.4 MG/1
0.4 CAPSULE ORAL NIGHTLY
Status: DISCONTINUED | OUTPATIENT
Start: 2022-01-01 | End: 2022-01-01 | Stop reason: HOSPADM

## 2022-01-01 RX ORDER — PROPOFOL 10 MG/ML
5-50 INJECTION, EMULSION INTRAVENOUS
Status: DISCONTINUED | OUTPATIENT
Start: 2022-01-01 | End: 2022-01-01

## 2022-01-01 RX ORDER — NIFEDIPINE 30 MG/1
30 TABLET, EXTENDED RELEASE ORAL DAILY
Qty: 30 TABLET | Refills: 0
Start: 2022-01-01

## 2022-01-01 RX ORDER — SENNA PLUS 8.6 MG/1
2 TABLET ORAL NIGHTLY PRN
Status: DISCONTINUED | OUTPATIENT
Start: 2022-01-01 | End: 2022-01-01 | Stop reason: HOSPADM

## 2022-01-01 RX ORDER — QUETIAPINE FUMARATE 25 MG/1
25 TABLET, FILM COATED ORAL EVERY 4 HOURS PRN
Status: DISCONTINUED | OUTPATIENT
Start: 2022-01-01 | End: 2022-01-01 | Stop reason: HOSPADM

## 2022-01-01 RX ORDER — MIDAZOLAM HYDROCHLORIDE 1 MG/ML
2 INJECTION INTRAMUSCULAR; INTRAVENOUS ONCE
Status: COMPLETED | OUTPATIENT
Start: 2022-01-01 | End: 2022-01-01

## 2022-01-01 RX ORDER — OXYCODONE HYDROCHLORIDE 5 MG/1
5 TABLET ORAL EVERY 4 HOURS PRN
Qty: 18 TABLET | Refills: 0 | Status: SHIPPED | OUTPATIENT
Start: 2022-01-01 | End: 2022-01-01

## 2022-01-01 RX ORDER — GABAPENTIN 100 MG/1
200 CAPSULE ORAL 3 TIMES DAILY
Qty: 180 CAPSULE | Refills: 0 | Status: ON HOLD
Start: 2022-01-01 | End: 2022-01-01 | Stop reason: HOSPADM

## 2022-01-01 RX ORDER — MEPERIDINE HYDROCHLORIDE 25 MG/ML
12.5 INJECTION INTRAMUSCULAR; INTRAVENOUS; SUBCUTANEOUS EVERY 5 MIN PRN
Status: DISCONTINUED | OUTPATIENT
Start: 2022-01-01 | End: 2022-01-01 | Stop reason: HOSPADM

## 2022-01-01 RX ORDER — TIZANIDINE 4 MG/1
8 TABLET ORAL 2 TIMES DAILY
Status: DISCONTINUED | OUTPATIENT
Start: 2022-01-01 | End: 2022-01-01 | Stop reason: HOSPADM

## 2022-01-01 RX ORDER — BISACODYL 10 MG
10 SUPPOSITORY, RECTAL RECTAL DAILY PRN
Status: DISCONTINUED | OUTPATIENT
Start: 2022-01-01 | End: 2022-01-01 | Stop reason: HOSPADM

## 2022-01-01 RX ORDER — DEXTROSE MONOHYDRATE 50 MG/ML
100 INJECTION, SOLUTION INTRAVENOUS PRN
Status: DISCONTINUED | OUTPATIENT
Start: 2022-01-01 | End: 2022-01-01 | Stop reason: HOSPADM

## 2022-01-01 RX ORDER — PROMETHAZINE HYDROCHLORIDE 25 MG/1
12.5 TABLET ORAL EVERY 6 HOURS PRN
Status: DISCONTINUED | OUTPATIENT
Start: 2022-01-01 | End: 2022-01-01 | Stop reason: HOSPADM

## 2022-01-01 RX ORDER — DEXMEDETOMIDINE HYDROCHLORIDE 4 UG/ML
.2-2 INJECTION, SOLUTION INTRAVENOUS CONTINUOUS
Status: DISCONTINUED | OUTPATIENT
Start: 2022-01-01 | End: 2022-01-01

## 2022-01-01 RX ORDER — LINEZOLID 600 MG/1
600 TABLET, FILM COATED ORAL ONCE
Status: COMPLETED | OUTPATIENT
Start: 2022-01-01 | End: 2022-01-01

## 2022-01-01 RX ORDER — BISACODYL 10 MG
10 SUPPOSITORY, RECTAL RECTAL DAILY PRN
Qty: 30 SUPPOSITORY | Refills: 0
Start: 2022-01-01 | End: 2022-01-01

## 2022-01-01 RX ORDER — SENNA PLUS 8.6 MG/1
2 TABLET ORAL NIGHTLY PRN
Qty: 30 TABLET | Refills: 0
Start: 2022-01-01 | End: 2022-01-01

## 2022-01-01 RX ORDER — POLYETHYLENE GLYCOL 3350 17 G/17G
17 POWDER, FOR SOLUTION ORAL DAILY PRN
Status: DISCONTINUED | OUTPATIENT
Start: 2022-01-01 | End: 2022-01-01 | Stop reason: HOSPADM

## 2022-01-01 RX ORDER — HEPARIN SODIUM 1000 [USP'U]/ML
3000 INJECTION, SOLUTION INTRAVENOUS; SUBCUTANEOUS PRN
Status: DISCONTINUED | OUTPATIENT
Start: 2022-01-01 | End: 2022-01-01 | Stop reason: HOSPADM

## 2022-01-01 RX ORDER — HYDRALAZINE HYDROCHLORIDE 10 MG/1
10 TABLET, FILM COATED ORAL
Status: DISCONTINUED | OUTPATIENT
Start: 2022-01-01 | End: 2022-01-01 | Stop reason: HOSPADM

## 2022-01-01 RX ORDER — PANTOPRAZOLE SODIUM 40 MG/1
40 TABLET, DELAYED RELEASE ORAL 2 TIMES DAILY
Status: DISCONTINUED | OUTPATIENT
Start: 2022-01-01 | End: 2022-01-01 | Stop reason: SDUPTHER

## 2022-01-01 RX ORDER — QUETIAPINE FUMARATE 25 MG/1
TABLET, FILM COATED ORAL
Qty: 60 TABLET | Refills: 3
Start: 2022-01-01 | End: 2022-01-01 | Stop reason: HOSPADM

## 2022-01-01 RX ORDER — HEPARIN SODIUM 1000 [USP'U]/ML
1300 INJECTION, SOLUTION INTRAVENOUS; SUBCUTANEOUS PRN
Status: DISCONTINUED | OUTPATIENT
Start: 2022-01-01 | End: 2022-01-01

## 2022-01-01 RX ORDER — NALOXONE HYDROCHLORIDE 0.4 MG/ML
0.4 INJECTION, SOLUTION INTRAMUSCULAR; INTRAVENOUS; SUBCUTANEOUS PRN
Status: DISCONTINUED | OUTPATIENT
Start: 2022-01-01 | End: 2022-01-01

## 2022-01-01 RX ORDER — METOPROLOL SUCCINATE 50 MG/1
50 TABLET, EXTENDED RELEASE ORAL DAILY
Status: DISCONTINUED | OUTPATIENT
Start: 2022-01-01 | End: 2022-01-01

## 2022-01-01 RX ORDER — FUROSEMIDE 10 MG/ML
100 INJECTION INTRAMUSCULAR; INTRAVENOUS ONCE
Status: COMPLETED | OUTPATIENT
Start: 2022-01-01 | End: 2022-01-01

## 2022-01-01 RX ORDER — TIZANIDINE 4 MG/1
4 TABLET ORAL 3 TIMES DAILY
Qty: 180 TABLET | Refills: 0
Start: 2022-01-01

## 2022-01-01 RX ORDER — MIDAZOLAM HYDROCHLORIDE 1 MG/ML
INJECTION INTRAMUSCULAR; INTRAVENOUS
Status: COMPLETED
Start: 2022-01-01 | End: 2022-01-01

## 2022-01-01 RX ORDER — PANTOPRAZOLE SODIUM 40 MG/1
40 TABLET, DELAYED RELEASE ORAL
Status: DISCONTINUED | OUTPATIENT
Start: 2022-01-01 | End: 2022-01-01 | Stop reason: HOSPADM

## 2022-01-01 RX ORDER — ONDANSETRON 2 MG/ML
4 INJECTION INTRAMUSCULAR; INTRAVENOUS EVERY 6 HOURS PRN
Status: DISCONTINUED | OUTPATIENT
Start: 2022-01-01 | End: 2022-01-01

## 2022-01-01 RX ORDER — POLYETHYLENE GLYCOL 3350 17 G/17G
17 POWDER, FOR SOLUTION ORAL DAILY PRN
Status: DISCONTINUED | OUTPATIENT
Start: 2022-01-01 | End: 2022-01-01

## 2022-01-01 RX ORDER — INSULIN GLARGINE 100 [IU]/ML
10 INJECTION, SOLUTION SUBCUTANEOUS EVERY MORNING
Status: DISCONTINUED | OUTPATIENT
Start: 2022-01-01 | End: 2022-01-01

## 2022-01-01 RX ORDER — PROPOFOL 10 MG/ML
INJECTION, EMULSION INTRAVENOUS
Status: COMPLETED
Start: 2022-01-01 | End: 2022-01-01

## 2022-01-01 RX ORDER — DEXTROSE MONOHYDRATE 25 G/50ML
12.5 INJECTION, SOLUTION INTRAVENOUS PRN
Status: DISCONTINUED | OUTPATIENT
Start: 2022-01-01 | End: 2022-01-01

## 2022-01-01 RX ORDER — LANOLIN ALCOHOL/MO/W.PET/CERES
400 CREAM (GRAM) TOPICAL 2 TIMES DAILY
Qty: 30 TABLET | Refills: 0
Start: 2022-01-01 | End: 2022-01-01 | Stop reason: HOSPADM

## 2022-01-01 RX ORDER — INSULIN GLARGINE 100 [IU]/ML
25 INJECTION, SOLUTION SUBCUTANEOUS 2 TIMES DAILY
Status: DISCONTINUED | OUTPATIENT
Start: 2022-01-01 | End: 2022-01-01

## 2022-01-01 RX ORDER — ONDANSETRON 4 MG/1
4 TABLET, ORALLY DISINTEGRATING ORAL EVERY 8 HOURS PRN
Qty: 30 TABLET | Refills: 0
Start: 2022-01-01

## 2022-01-01 RX ORDER — FENTANYL CITRATE 50 UG/ML
25 INJECTION, SOLUTION INTRAMUSCULAR; INTRAVENOUS EVERY 5 MIN PRN
Status: DISCONTINUED | OUTPATIENT
Start: 2022-01-01 | End: 2022-01-01 | Stop reason: HOSPADM

## 2022-01-01 RX ORDER — INSULIN LISPRO 100 [IU]/ML
4 INJECTION, SOLUTION INTRAVENOUS; SUBCUTANEOUS
Status: DISCONTINUED | OUTPATIENT
Start: 2022-01-01 | End: 2022-01-01

## 2022-01-01 RX ORDER — HALOPERIDOL 1 MG/1
0.5 TABLET ORAL EVERY 6 HOURS PRN
Status: DISCONTINUED | OUTPATIENT
Start: 2022-01-01 | End: 2022-01-01 | Stop reason: HOSPADM

## 2022-01-01 RX ORDER — ONDANSETRON 4 MG/1
4 TABLET, ORALLY DISINTEGRATING ORAL EVERY 8 HOURS PRN
Status: DISCONTINUED | OUTPATIENT
Start: 2022-01-01 | End: 2022-01-01

## 2022-01-01 RX ORDER — INSULIN GLARGINE 100 [IU]/ML
20 INJECTION, SOLUTION SUBCUTANEOUS 2 TIMES DAILY
Status: DISCONTINUED | OUTPATIENT
Start: 2022-01-01 | End: 2022-01-01

## 2022-01-01 RX ORDER — NIFEDIPINE 30 MG/1
30 TABLET, EXTENDED RELEASE ORAL DAILY
Status: DISCONTINUED | OUTPATIENT
Start: 2022-01-01 | End: 2022-01-01 | Stop reason: HOSPADM

## 2022-01-01 RX ORDER — METOPROLOL SUCCINATE 25 MG/1
25 TABLET, EXTENDED RELEASE ORAL DAILY
Status: DISCONTINUED | OUTPATIENT
Start: 2022-01-01 | End: 2022-01-01

## 2022-01-01 RX ORDER — INSULIN GLARGINE 100 [IU]/ML
40 INJECTION, SOLUTION SUBCUTANEOUS NIGHTLY
Status: DISCONTINUED | OUTPATIENT
Start: 2022-01-01 | End: 2022-01-01

## 2022-01-01 RX ORDER — METOPROLOL TARTRATE 5 MG/5ML
5 INJECTION INTRAVENOUS EVERY 6 HOURS
Status: DISCONTINUED | OUTPATIENT
Start: 2022-01-01 | End: 2022-01-01

## 2022-01-01 RX ORDER — FUROSEMIDE 10 MG/ML
80 INJECTION INTRAMUSCULAR; INTRAVENOUS ONCE
Status: COMPLETED | OUTPATIENT
Start: 2022-01-01 | End: 2022-01-01

## 2022-01-01 RX ORDER — TAMSULOSIN HYDROCHLORIDE 0.4 MG/1
0.4 CAPSULE ORAL NIGHTLY
Qty: 30 CAPSULE | Refills: 3
Start: 2022-01-01

## 2022-01-01 RX ORDER — DEXTROSE MONOHYDRATE 50 MG/ML
100 INJECTION, SOLUTION INTRAVENOUS PRN
Status: DISCONTINUED | OUTPATIENT
Start: 2022-01-01 | End: 2022-01-01 | Stop reason: SDUPTHER

## 2022-01-01 RX ORDER — HEPARIN SODIUM 1000 [USP'U]/ML
2000 INJECTION, SOLUTION INTRAVENOUS; SUBCUTANEOUS PRN
Status: DISCONTINUED | OUTPATIENT
Start: 2022-01-01 | End: 2022-01-01

## 2022-01-01 RX ORDER — DILTIAZEM HYDROCHLORIDE 60 MG/1
30 TABLET, FILM COATED ORAL EVERY 6 HOURS SCHEDULED
Status: DISCONTINUED | OUTPATIENT
Start: 2022-01-01 | End: 2022-01-01 | Stop reason: HOSPADM

## 2022-01-01 RX ORDER — TIZANIDINE 4 MG/1
4 TABLET ORAL EVERY 6 HOURS PRN
Status: DISCONTINUED | OUTPATIENT
Start: 2022-01-01 | End: 2022-01-01

## 2022-01-01 RX ORDER — TRAZODONE HYDROCHLORIDE 50 MG/1
50 TABLET ORAL NIGHTLY PRN
Status: DISCONTINUED | OUTPATIENT
Start: 2022-01-01 | End: 2022-01-01

## 2022-01-01 RX ORDER — FENTANYL CITRATE 50 UG/ML
INJECTION, SOLUTION INTRAMUSCULAR; INTRAVENOUS
Status: COMPLETED | OUTPATIENT
Start: 2022-01-01 | End: 2022-01-01

## 2022-01-01 RX ORDER — FLUTICASONE PROPIONATE 50 MCG
2 SPRAY, SUSPENSION (ML) NASAL NIGHTLY
Qty: 16 G | Refills: 3
Start: 2022-01-01

## 2022-01-01 RX ORDER — ACETAMINOPHEN 160 MG/5ML
500 SOLUTION ORAL ONCE
Status: COMPLETED | OUTPATIENT
Start: 2022-01-01 | End: 2022-01-01

## 2022-01-01 RX ORDER — NIFEDIPINE 30 MG/1
30 TABLET, EXTENDED RELEASE ORAL 2 TIMES DAILY
Qty: 30 TABLET | Refills: 3
Start: 2022-01-01 | End: 2022-01-01

## 2022-01-01 RX ORDER — LISINOPRIL 5 MG/1
5 TABLET ORAL DAILY
Status: DISCONTINUED | OUTPATIENT
Start: 2022-01-01 | End: 2022-01-01

## 2022-01-01 RX ORDER — INSULIN GLARGINE 100 [IU]/ML
10 INJECTION, SOLUTION SUBCUTANEOUS NIGHTLY
Qty: 5 PEN | Refills: 3 | Status: ON HOLD
Start: 2022-01-01 | End: 2022-01-01 | Stop reason: SDUPTHER

## 2022-01-01 RX ORDER — PAROXETINE HYDROCHLORIDE 20 MG/1
10 TABLET, FILM COATED ORAL EVERY MORNING
Status: DISCONTINUED | OUTPATIENT
Start: 2022-01-01 | End: 2022-01-01 | Stop reason: HOSPADM

## 2022-01-01 RX ORDER — POLYETHYLENE GLYCOL 3350 17 G/17G
17 POWDER, FOR SOLUTION ORAL DAILY
Status: DISCONTINUED | OUTPATIENT
Start: 2022-01-01 | End: 2022-01-01

## 2022-01-01 RX ORDER — HYDRALAZINE HYDROCHLORIDE 20 MG/ML
10 INJECTION INTRAMUSCULAR; INTRAVENOUS EVERY 6 HOURS PRN
Status: DISCONTINUED | OUTPATIENT
Start: 2022-01-01 | End: 2022-01-01

## 2022-01-01 RX ORDER — HYDROMORPHONE HYDROCHLORIDE 2 MG/1
0.5 TABLET ORAL
Status: DISCONTINUED | OUTPATIENT
Start: 2022-01-01 | End: 2022-01-01

## 2022-01-01 RX ORDER — SODIUM CHLORIDE 9 MG/ML
INJECTION, SOLUTION INTRAVENOUS ONCE
Status: COMPLETED | OUTPATIENT
Start: 2022-01-01 | End: 2022-01-01

## 2022-01-01 RX ORDER — ROCURONIUM BROMIDE 10 MG/ML
INJECTION, SOLUTION INTRAVENOUS PRN
Status: DISCONTINUED | OUTPATIENT
Start: 2022-01-01 | End: 2022-01-01 | Stop reason: SDUPTHER

## 2022-01-01 RX ORDER — SUCRALFATE 1 G/1
1 TABLET ORAL 4 TIMES DAILY
Qty: 120 TABLET | Refills: 3
Start: 2022-01-01

## 2022-01-01 RX ORDER — INSULIN GLARGINE 100 [IU]/ML
10 INJECTION, SOLUTION SUBCUTANEOUS 2 TIMES DAILY
Status: DISCONTINUED | OUTPATIENT
Start: 2022-01-01 | End: 2022-01-01

## 2022-01-01 RX ORDER — MAGNESIUM HYDROXIDE 1200 MG/15ML
LIQUID ORAL CONTINUOUS PRN
Status: COMPLETED | OUTPATIENT
Start: 2022-01-01 | End: 2022-01-01

## 2022-01-01 RX ORDER — SODIUM CHLORIDE 0.9 % (FLUSH) 0.9 %
SYRINGE (ML) INJECTION
Status: DISPENSED
Start: 2022-01-01 | End: 2022-01-01

## 2022-01-01 RX ORDER — ACETAMINOPHEN 325 MG/1
650 TABLET ORAL EVERY 6 HOURS PRN
Status: DISCONTINUED | OUTPATIENT
Start: 2022-01-01 | End: 2022-01-01 | Stop reason: HOSPADM

## 2022-01-01 RX ORDER — INSULIN LISPRO 100 [IU]/ML
0-9 INJECTION, SOLUTION INTRAVENOUS; SUBCUTANEOUS NIGHTLY
Status: DISCONTINUED | OUTPATIENT
Start: 2022-01-01 | End: 2022-01-01 | Stop reason: HOSPADM

## 2022-01-01 RX ORDER — SODIUM CHLORIDE 0.9 % (FLUSH) 0.9 %
10 SYRINGE (ML) INJECTION EVERY 12 HOURS SCHEDULED
Status: CANCELLED | OUTPATIENT
Start: 2022-01-01

## 2022-01-01 RX ORDER — FENTANYL CITRATE 50 UG/ML
25 INJECTION, SOLUTION INTRAMUSCULAR; INTRAVENOUS
Status: COMPLETED | OUTPATIENT
Start: 2022-01-01 | End: 2022-01-01

## 2022-01-01 RX ORDER — SODIUM CHLORIDE 9 MG/ML
25 INJECTION, SOLUTION INTRAVENOUS PRN
Status: CANCELLED | OUTPATIENT
Start: 2022-01-01

## 2022-01-01 RX ORDER — MEPERIDINE HYDROCHLORIDE 50 MG/ML
12.5 INJECTION INTRAMUSCULAR; INTRAVENOUS; SUBCUTANEOUS EVERY 5 MIN PRN
Status: DISCONTINUED | OUTPATIENT
Start: 2022-01-01 | End: 2022-01-01 | Stop reason: HOSPADM

## 2022-01-01 RX ORDER — DILTIAZEM HYDROCHLORIDE 5 MG/ML
5 INJECTION INTRAVENOUS ONCE
Status: COMPLETED | OUTPATIENT
Start: 2022-01-01 | End: 2022-01-01

## 2022-01-01 RX ORDER — METOCLOPRAMIDE 10 MG/1
10 TABLET ORAL
Status: DISCONTINUED | OUTPATIENT
Start: 2022-01-01 | End: 2022-01-01 | Stop reason: HOSPADM

## 2022-01-01 RX ORDER — LOPERAMIDE HYDROCHLORIDE 2 MG/1
2 CAPSULE ORAL 4 TIMES DAILY PRN
Status: DISCONTINUED | OUTPATIENT
Start: 2022-01-01 | End: 2022-01-01 | Stop reason: HOSPADM

## 2022-01-01 RX ORDER — SUCRALFATE 1 G/1
1 TABLET ORAL
Status: DISCONTINUED | OUTPATIENT
Start: 2022-01-01 | End: 2022-01-01 | Stop reason: HOSPADM

## 2022-01-01 RX ORDER — OXYCODONE HYDROCHLORIDE 5 MG/1
5 TABLET ORAL PRN
Status: DISCONTINUED | OUTPATIENT
Start: 2022-01-01 | End: 2022-01-01 | Stop reason: HOSPADM

## 2022-01-01 RX ORDER — TIZANIDINE 4 MG/1
8 TABLET ORAL NIGHTLY
Qty: 180 TABLET | Refills: 0 | Status: ON HOLD | OUTPATIENT
Start: 2022-01-01 | End: 2022-01-01 | Stop reason: SDUPTHER

## 2022-01-01 RX ORDER — MIDAZOLAM HYDROCHLORIDE 5 MG/ML
INJECTION INTRAMUSCULAR; INTRAVENOUS
Status: COMPLETED | OUTPATIENT
Start: 2022-01-01 | End: 2022-01-01

## 2022-01-01 RX ORDER — M-VIT,TX,IRON,MINS/CALC/FOLIC 27MG-0.4MG
1 TABLET ORAL DAILY
Status: DISCONTINUED | OUTPATIENT
Start: 2022-01-01 | End: 2022-01-01 | Stop reason: HOSPADM

## 2022-01-01 RX ORDER — SODIUM POLYSTYRENE SULFONATE 15 G/60ML
15 SUSPENSION ORAL; RECTAL ONCE
Status: DISCONTINUED | OUTPATIENT
Start: 2022-01-01 | End: 2022-01-01

## 2022-01-01 RX ORDER — PROCHLORPERAZINE MALEATE 5 MG/1
5 TABLET ORAL EVERY 6 HOURS PRN
Qty: 120 TABLET | Refills: 3
Start: 2022-01-01

## 2022-01-01 RX ORDER — LOSARTAN POTASSIUM 50 MG/1
50 TABLET ORAL DAILY
Qty: 90 TABLET | Refills: 1 | Status: ON HOLD | OUTPATIENT
Start: 2022-01-01 | End: 2022-01-01 | Stop reason: HOSPADM

## 2022-01-01 RX ORDER — METOPROLOL SUCCINATE 25 MG/1
12.5 TABLET, EXTENDED RELEASE ORAL ONCE
Status: COMPLETED | OUTPATIENT
Start: 2022-01-01 | End: 2022-01-01

## 2022-01-01 RX ORDER — CARVEDILOL 6.25 MG/1
12.5 TABLET ORAL 2 TIMES DAILY WITH MEALS
Status: DISCONTINUED | OUTPATIENT
Start: 2022-01-01 | End: 2022-01-01 | Stop reason: HOSPADM

## 2022-01-01 RX ORDER — QUETIAPINE FUMARATE 100 MG/1
100 TABLET, FILM COATED ORAL NIGHTLY
Status: DISCONTINUED | OUTPATIENT
Start: 2022-01-01 | End: 2022-01-01

## 2022-01-01 RX ORDER — MAGNESIUM HYDROXIDE/ALUMINUM HYDROXICE/SIMETHICONE 120; 1200; 1200 MG/30ML; MG/30ML; MG/30ML
30 SUSPENSION ORAL EVERY 6 HOURS PRN
Qty: 1 EACH | Refills: 0
Start: 2022-01-01

## 2022-01-01 RX ORDER — PROMETHAZINE HYDROCHLORIDE 12.5 MG/1
12.5 TABLET ORAL EVERY 6 HOURS PRN
Qty: 28 TABLET | Refills: 0
Start: 2022-01-01 | End: 2022-01-01

## 2022-01-01 RX ORDER — FLUCONAZOLE 100 MG/1
100 TABLET ORAL DAILY
Qty: 15 TABLET | Refills: 0
Start: 2022-01-01 | End: 2022-01-01

## 2022-01-01 RX ORDER — AMLODIPINE BESYLATE 5 MG/1
TABLET ORAL
Status: DISPENSED
Start: 2022-01-01 | End: 2022-01-01

## 2022-01-01 RX ORDER — METOPROLOL SUCCINATE 25 MG/1
25 TABLET, EXTENDED RELEASE ORAL 2 TIMES DAILY
Qty: 60 TABLET | Refills: 0
Start: 2022-01-01

## 2022-01-01 RX ORDER — LABETALOL HYDROCHLORIDE 5 MG/ML
20 INJECTION, SOLUTION INTRAVENOUS EVERY 4 HOURS PRN
Status: DISCONTINUED | OUTPATIENT
Start: 2022-01-01 | End: 2022-01-01

## 2022-01-01 RX ORDER — DEXTROSE MONOHYDRATE 25 G/50ML
12.5 INJECTION, SOLUTION INTRAVENOUS PRN
Status: DISCONTINUED | OUTPATIENT
Start: 2022-01-01 | End: 2022-01-01 | Stop reason: ALTCHOICE

## 2022-01-01 RX ORDER — SODIUM CHLORIDE 9 MG/ML
INJECTION, SOLUTION INTRAVENOUS PRN
Status: DISCONTINUED | OUTPATIENT
Start: 2022-01-01 | End: 2022-01-01 | Stop reason: HOSPADM

## 2022-01-01 RX ORDER — INSULIN GLARGINE 100 [IU]/ML
15 INJECTION, SOLUTION SUBCUTANEOUS 2 TIMES DAILY
Status: DISCONTINUED | OUTPATIENT
Start: 2022-01-01 | End: 2022-01-01

## 2022-01-01 RX ORDER — OXYCODONE HYDROCHLORIDE 15 MG/1
7.5 TABLET ORAL EVERY 4 HOURS PRN
Status: DISCONTINUED | OUTPATIENT
Start: 2022-01-01 | End: 2022-01-01

## 2022-01-01 RX ORDER — TRAZODONE HYDROCHLORIDE 50 MG/1
50 TABLET ORAL NIGHTLY
Status: DISCONTINUED | OUTPATIENT
Start: 2022-01-01 | End: 2022-01-01

## 2022-01-01 RX ORDER — PROPOFOL 10 MG/ML
INJECTION, EMULSION INTRAVENOUS
Status: DISPENSED
Start: 2022-01-01 | End: 2022-01-01

## 2022-01-01 RX ORDER — INSULIN GLARGINE 100 [IU]/ML
25 INJECTION, SOLUTION SUBCUTANEOUS NIGHTLY
Qty: 5 PEN | Refills: 3
Start: 2022-01-01

## 2022-01-01 RX ORDER — INSULIN GLARGINE 100 [IU]/ML
50 INJECTION, SOLUTION SUBCUTANEOUS 2 TIMES DAILY
Status: DISCONTINUED | OUTPATIENT
Start: 2022-01-01 | End: 2022-01-01

## 2022-01-01 RX ORDER — SODIUM CHLORIDE 9 MG/ML
25 INJECTION, SOLUTION INTRAVENOUS PRN
Status: DISCONTINUED | OUTPATIENT
Start: 2022-01-01 | End: 2022-01-01

## 2022-01-01 RX ORDER — ACETAMINOPHEN 650 MG/1
650 SUPPOSITORY RECTAL EVERY 6 HOURS PRN
Status: DISCONTINUED | OUTPATIENT
Start: 2022-01-01 | End: 2022-01-01 | Stop reason: HOSPADM

## 2022-01-01 RX ORDER — AMLODIPINE BESYLATE 5 MG/1
5 TABLET ORAL DAILY
Status: DISCONTINUED | OUTPATIENT
Start: 2022-01-01 | End: 2022-01-01

## 2022-01-01 RX ORDER — MIDAZOLAM HYDROCHLORIDE 1 MG/ML
2 INJECTION INTRAMUSCULAR; INTRAVENOUS
Status: DISCONTINUED | OUTPATIENT
Start: 2022-01-01 | End: 2022-01-01

## 2022-01-01 RX ORDER — METOPROLOL SUCCINATE 50 MG/1
50 TABLET, EXTENDED RELEASE ORAL 2 TIMES DAILY
Status: DISCONTINUED | OUTPATIENT
Start: 2022-01-01 | End: 2022-01-01

## 2022-01-01 RX ORDER — HEPARIN SODIUM 5000 [USP'U]/ML
5000 INJECTION, SOLUTION INTRAVENOUS; SUBCUTANEOUS EVERY 8 HOURS SCHEDULED
Qty: 1 EACH | Refills: 0
Start: 2022-01-01

## 2022-01-01 RX ADMIN — PAROXETINE HYDROCHLORIDE 20 MG: 20 TABLET, FILM COATED ORAL at 08:09

## 2022-01-01 RX ADMIN — PROPOFOL INJECTABLE EMULSION 50 MCG/KG/MIN: 10 INJECTION, EMULSION INTRAVENOUS at 17:43

## 2022-01-01 RX ADMIN — SUCRALFATE 1 G: 1 TABLET ORAL at 11:20

## 2022-01-01 RX ADMIN — SUCRALFATE 1 G: 1 TABLET ORAL at 20:19

## 2022-01-01 RX ADMIN — Medication 400 MG: at 20:33

## 2022-01-01 RX ADMIN — CHLORHEXIDINE GLUCONATE 0.12% ORAL RINSE 15 ML: 1.2 LIQUID ORAL at 08:07

## 2022-01-01 RX ADMIN — HYDROCORTISONE SODIUM SUCCINATE 50 MG: 100 INJECTION, POWDER, FOR SOLUTION INTRAMUSCULAR; INTRAVENOUS at 21:45

## 2022-01-01 RX ADMIN — DILTIAZEM HYDROCHLORIDE 30 MG: 60 TABLET, FILM COATED ORAL at 05:47

## 2022-01-01 RX ADMIN — EPOETIN ALFA-EPBX 10000 UNITS: 10000 INJECTION, SOLUTION INTRAVENOUS; SUBCUTANEOUS at 18:18

## 2022-01-01 RX ADMIN — SODIUM CHLORIDE 2.1 UNITS/HR: 9 INJECTION, SOLUTION INTRAVENOUS at 13:38

## 2022-01-01 RX ADMIN — MIDAZOLAM HYDROCHLORIDE 2 MG: 2 INJECTION, SOLUTION INTRAMUSCULAR; INTRAVENOUS at 04:44

## 2022-01-01 RX ADMIN — HYDROMORPHONE HYDROCHLORIDE 1 MG: 1 INJECTION, SOLUTION INTRAMUSCULAR; INTRAVENOUS; SUBCUTANEOUS at 13:45

## 2022-01-01 RX ADMIN — Medication: at 09:27

## 2022-01-01 RX ADMIN — DILTIAZEM HYDROCHLORIDE 30 MG: 60 TABLET, FILM COATED ORAL at 18:15

## 2022-01-01 RX ADMIN — PROPOFOL INJECTABLE EMULSION 50 MCG/KG/MIN: 10 INJECTION, EMULSION INTRAVENOUS at 19:17

## 2022-01-01 RX ADMIN — Medication 125 MCG/HR: at 12:01

## 2022-01-01 RX ADMIN — INSULIN GLARGINE 20 UNITS: 100 INJECTION, SOLUTION SUBCUTANEOUS at 09:50

## 2022-01-01 RX ADMIN — TAMSULOSIN HYDROCHLORIDE 0.4 MG: 0.4 CAPSULE ORAL at 21:28

## 2022-01-01 RX ADMIN — INSULIN LISPRO 2 UNITS: 100 INJECTION, SOLUTION INTRAVENOUS; SUBCUTANEOUS at 21:59

## 2022-01-01 RX ADMIN — HEPARIN SODIUM 5000 UNITS: 5000 INJECTION INTRAVENOUS; SUBCUTANEOUS at 06:31

## 2022-01-01 RX ADMIN — DILTIAZEM HYDROCHLORIDE 30 MG: 60 TABLET, FILM COATED ORAL at 12:50

## 2022-01-01 RX ADMIN — OXYCODONE HYDROCHLORIDE AND ACETAMINOPHEN 2 TABLET: 5; 325 TABLET ORAL at 16:39

## 2022-01-01 RX ADMIN — Medication 2 MCG/KG/HR: at 12:40

## 2022-01-01 RX ADMIN — METOCLOPRAMIDE 10 MG: 10 TABLET ORAL at 21:10

## 2022-01-01 RX ADMIN — MIDAZOLAM HYDROCHLORIDE 2 MG: 1 INJECTION, SOLUTION INTRAMUSCULAR; INTRAVENOUS at 07:59

## 2022-01-01 RX ADMIN — AMPICILLIN SODIUM AND SULBACTAM SODIUM 3000 MG: 2; 1 INJECTION, POWDER, FOR SOLUTION INTRAMUSCULAR; INTRAVENOUS at 21:49

## 2022-01-01 RX ADMIN — GABAPENTIN 400 MG: 400 CAPSULE ORAL at 20:51

## 2022-01-01 RX ADMIN — QUETIAPINE FUMARATE 25 MG: 25 TABLET ORAL at 09:26

## 2022-01-01 RX ADMIN — FAMOTIDINE 20 MG: 10 INJECTION, SOLUTION INTRAVENOUS at 08:07

## 2022-01-01 RX ADMIN — SODIUM CHLORIDE, PRESERVATIVE FREE 10 ML: 5 INJECTION INTRAVENOUS at 20:40

## 2022-01-01 RX ADMIN — OXYCODONE HYDROCHLORIDE AND ACETAMINOPHEN 1 TABLET: 5; 325 TABLET ORAL at 15:07

## 2022-01-01 RX ADMIN — OXYCODONE 10 MG: 5 TABLET ORAL at 15:14

## 2022-01-01 RX ADMIN — HEPARIN SODIUM 5000 UNITS: 5000 INJECTION INTRAVENOUS; SUBCUTANEOUS at 20:16

## 2022-01-01 RX ADMIN — CARVEDILOL 12.5 MG: 6.25 TABLET, FILM COATED ORAL at 07:39

## 2022-01-01 RX ADMIN — Medication 4 MG/HR: at 15:08

## 2022-01-01 RX ADMIN — INSULIN GLARGINE 25 UNITS: 100 INJECTION, SOLUTION SUBCUTANEOUS at 22:01

## 2022-01-01 RX ADMIN — OXYCODONE HYDROCHLORIDE AND ACETAMINOPHEN 1 TABLET: 5; 325 TABLET ORAL at 18:22

## 2022-01-01 RX ADMIN — SODIUM CHLORIDE 5.52 UNITS/HR: 9 INJECTION, SOLUTION INTRAVENOUS at 00:16

## 2022-01-01 RX ADMIN — PROPOFOL INJECTABLE EMULSION 30 MCG/KG/MIN: 10 INJECTION, EMULSION INTRAVENOUS at 09:01

## 2022-01-01 RX ADMIN — OXYCODONE 10 MG: 5 TABLET ORAL at 16:52

## 2022-01-01 RX ADMIN — SODIUM CHLORIDE, PRESERVATIVE FREE 10 ML: 5 INJECTION INTRAVENOUS at 21:09

## 2022-01-01 RX ADMIN — SODIUM CHLORIDE: 9 INJECTION, SOLUTION INTRAVENOUS at 05:24

## 2022-01-01 RX ADMIN — HEPARIN SODIUM 5000 UNITS: 5000 INJECTION INTRAVENOUS; SUBCUTANEOUS at 20:22

## 2022-01-01 RX ADMIN — SUCRALFATE 1 G: 1 TABLET ORAL at 20:54

## 2022-01-01 RX ADMIN — HEPARIN SODIUM 5000 UNITS: 5000 INJECTION INTRAVENOUS; SUBCUTANEOUS at 20:53

## 2022-01-01 RX ADMIN — INSULIN LISPRO 15 UNITS: 100 INJECTION, SOLUTION INTRAVENOUS; SUBCUTANEOUS at 04:26

## 2022-01-01 RX ADMIN — Medication 400 MG: at 09:39

## 2022-01-01 RX ADMIN — SUCRALFATE 1 G: 1 TABLET ORAL at 05:23

## 2022-01-01 RX ADMIN — Medication: at 12:00

## 2022-01-01 RX ADMIN — CARVEDILOL 12.5 MG: 6.25 TABLET, FILM COATED ORAL at 09:00

## 2022-01-01 RX ADMIN — HEPARIN SODIUM 5000 UNITS: 5000 INJECTION INTRAVENOUS; SUBCUTANEOUS at 14:38

## 2022-01-01 RX ADMIN — GABAPENTIN 400 MG: 400 CAPSULE ORAL at 21:10

## 2022-01-01 RX ADMIN — SODIUM CHLORIDE: 4.5 INJECTION, SOLUTION INTRAVENOUS at 10:55

## 2022-01-01 RX ADMIN — HEPARIN SODIUM 5000 UNITS: 5000 INJECTION INTRAVENOUS; SUBCUTANEOUS at 21:30

## 2022-01-01 RX ADMIN — Medication: at 09:29

## 2022-01-01 RX ADMIN — FAMOTIDINE 20 MG: 10 INJECTION, SOLUTION INTRAVENOUS at 10:28

## 2022-01-01 RX ADMIN — PROPOFOL INJECTABLE EMULSION 20 MCG/KG/MIN: 10 INJECTION, EMULSION INTRAVENOUS at 09:53

## 2022-01-01 RX ADMIN — OXYCODONE 10 MG: 5 TABLET ORAL at 01:35

## 2022-01-01 RX ADMIN — SODIUM CHLORIDE 30.87 UNITS/HR: 9 INJECTION, SOLUTION INTRAVENOUS at 23:34

## 2022-01-01 RX ADMIN — SODIUM CHLORIDE 9.1 UNITS/HR: 9 INJECTION, SOLUTION INTRAVENOUS at 09:29

## 2022-01-01 RX ADMIN — NAFCILLIN SODIUM 2000 MG: 2 INJECTION, POWDER, LYOPHILIZED, FOR SOLUTION INTRAMUSCULAR; INTRAVENOUS at 23:18

## 2022-01-01 RX ADMIN — Medication: at 18:21

## 2022-01-01 RX ADMIN — AMPICILLIN SODIUM AND SULBACTAM SODIUM 3000 MG: 2; 1 INJECTION, POWDER, FOR SOLUTION INTRAMUSCULAR; INTRAVENOUS at 12:32

## 2022-01-01 RX ADMIN — MUPIROCIN: 20 OINTMENT TOPICAL at 08:15

## 2022-01-01 RX ADMIN — AMPICILLIN SODIUM AND SULBACTAM SODIUM 3000 MG: 2; 1 INJECTION, POWDER, FOR SOLUTION INTRAMUSCULAR; INTRAVENOUS at 20:15

## 2022-01-01 RX ADMIN — TAMSULOSIN HYDROCHLORIDE 0.4 MG: 0.4 CAPSULE ORAL at 21:56

## 2022-01-01 RX ADMIN — HEPARIN SODIUM 2000 UNITS: 1000 INJECTION INTRAVENOUS; SUBCUTANEOUS at 08:36

## 2022-01-01 RX ADMIN — Medication 800 MG: at 14:16

## 2022-01-01 RX ADMIN — Medication 1.2 MCG/KG/HR: at 15:11

## 2022-01-01 RX ADMIN — Medication 400 MG: at 21:28

## 2022-01-01 RX ADMIN — TIZANIDINE 4 MG: 4 TABLET ORAL at 12:53

## 2022-01-01 RX ADMIN — METOCLOPRAMIDE 10 MG: 10 TABLET ORAL at 11:41

## 2022-01-01 RX ADMIN — NAFCILLIN SODIUM 2000 MG: 2 INJECTION, POWDER, LYOPHILIZED, FOR SOLUTION INTRAMUSCULAR; INTRAVENOUS at 02:30

## 2022-01-01 RX ADMIN — INSULIN LISPRO 2 UNITS: 100 INJECTION, SOLUTION INTRAVENOUS; SUBCUTANEOUS at 20:16

## 2022-01-01 RX ADMIN — NAFCILLIN SODIUM 2000 MG: 2 INJECTION, POWDER, LYOPHILIZED, FOR SOLUTION INTRAMUSCULAR; INTRAVENOUS at 12:24

## 2022-01-01 RX ADMIN — INSULIN LISPRO 4 UNITS: 100 INJECTION, SOLUTION INTRAVENOUS; SUBCUTANEOUS at 17:26

## 2022-01-01 RX ADMIN — TIZANIDINE 8 MG: 4 TABLET ORAL at 09:40

## 2022-01-01 RX ADMIN — HEPARIN SODIUM 5000 UNITS: 5000 INJECTION INTRAVENOUS; SUBCUTANEOUS at 06:47

## 2022-01-01 RX ADMIN — Medication 800 MG: at 20:12

## 2022-01-01 RX ADMIN — HEPARIN SODIUM 5000 UNITS: 5000 INJECTION INTRAVENOUS; SUBCUTANEOUS at 14:24

## 2022-01-01 RX ADMIN — AMPICILLIN SODIUM AND SULBACTAM SODIUM 3000 MG: 2; 1 INJECTION, POWDER, FOR SOLUTION INTRAMUSCULAR; INTRAVENOUS at 18:29

## 2022-01-01 RX ADMIN — INSULIN LISPRO 3 UNITS: 100 INJECTION, SOLUTION INTRAVENOUS; SUBCUTANEOUS at 16:05

## 2022-01-01 RX ADMIN — QUETIAPINE FUMARATE 25 MG: 25 TABLET ORAL at 09:42

## 2022-01-01 RX ADMIN — SODIUM CHLORIDE 25 ML: 9 INJECTION, SOLUTION INTRAVENOUS at 12:06

## 2022-01-01 RX ADMIN — PROCHLORPERAZINE MALEATE 5 MG: 5 TABLET ORAL at 08:08

## 2022-01-01 RX ADMIN — FUROSEMIDE 40 MG: 40 TABLET ORAL at 06:16

## 2022-01-01 RX ADMIN — NAFCILLIN SODIUM 2000 MG: 2 INJECTION, POWDER, LYOPHILIZED, FOR SOLUTION INTRAMUSCULAR; INTRAVENOUS at 20:09

## 2022-01-01 RX ADMIN — SODIUM CHLORIDE, PRESERVATIVE FREE 10 ML: 5 INJECTION INTRAVENOUS at 09:07

## 2022-01-01 RX ADMIN — INSULIN GLARGINE 40 UNITS: 100 INJECTION, SOLUTION SUBCUTANEOUS at 02:10

## 2022-01-01 RX ADMIN — Medication: at 03:09

## 2022-01-01 RX ADMIN — HEPARIN SODIUM 5000 UNITS: 5000 INJECTION INTRAVENOUS; SUBCUTANEOUS at 13:06

## 2022-01-01 RX ADMIN — FENTANYL CITRATE 50 MCG: 50 INJECTION INTRAMUSCULAR; INTRAVENOUS at 12:44

## 2022-01-01 RX ADMIN — HEPARIN SODIUM 5000 UNITS: 5000 INJECTION INTRAVENOUS; SUBCUTANEOUS at 14:23

## 2022-01-01 RX ADMIN — GABAPENTIN 200 MG: 100 CAPSULE ORAL at 12:50

## 2022-01-01 RX ADMIN — INSULIN LISPRO 4 UNITS: 100 INJECTION, SOLUTION INTRAVENOUS; SUBCUTANEOUS at 06:21

## 2022-01-01 RX ADMIN — NAFCILLIN SODIUM 2000 MG: 2 INJECTION, POWDER, LYOPHILIZED, FOR SOLUTION INTRAMUSCULAR; INTRAVENOUS at 05:06

## 2022-01-01 RX ADMIN — INSULIN LISPRO 3 UNITS: 100 INJECTION, SOLUTION INTRAVENOUS; SUBCUTANEOUS at 16:37

## 2022-01-01 RX ADMIN — QUETIAPINE FUMARATE 50 MG: 25 TABLET ORAL at 21:02

## 2022-01-01 RX ADMIN — HEPARIN SODIUM 5000 UNITS: 5000 INJECTION INTRAVENOUS; SUBCUTANEOUS at 12:53

## 2022-01-01 RX ADMIN — Medication 125 MCG/HR: at 03:57

## 2022-01-01 RX ADMIN — Medication 1 TABLET: at 11:36

## 2022-01-01 RX ADMIN — INSULIN GLARGINE 25 UNITS: 100 INJECTION, SOLUTION SUBCUTANEOUS at 21:16

## 2022-01-01 RX ADMIN — METOPROLOL SUCCINATE 25 MG: 25 TABLET, EXTENDED RELEASE ORAL at 09:07

## 2022-01-01 RX ADMIN — METOCLOPRAMIDE 10 MG: 10 TABLET ORAL at 12:50

## 2022-01-01 RX ADMIN — SUCRALFATE 1 G: 1 TABLET ORAL at 20:53

## 2022-01-01 RX ADMIN — PANTOPRAZOLE SODIUM 40 MG: 40 INJECTION, POWDER, FOR SOLUTION INTRAVENOUS at 21:08

## 2022-01-01 RX ADMIN — Medication: at 12:09

## 2022-01-01 RX ADMIN — SODIUM CHLORIDE, PRESERVATIVE FREE 10 ML: 5 INJECTION INTRAVENOUS at 08:15

## 2022-01-01 RX ADMIN — GABAPENTIN 400 MG: 400 CAPSULE ORAL at 07:51

## 2022-01-01 RX ADMIN — INSULIN GLARGINE 50 UNITS: 100 INJECTION, SOLUTION SUBCUTANEOUS at 08:37

## 2022-01-01 RX ADMIN — FUROSEMIDE 40 MG: 40 TABLET ORAL at 16:42

## 2022-01-01 RX ADMIN — DILTIAZEM HYDROCHLORIDE 30 MG: 60 TABLET, FILM COATED ORAL at 23:31

## 2022-01-01 RX ADMIN — HEPARIN SODIUM 5000 UNITS: 5000 INJECTION INTRAVENOUS; SUBCUTANEOUS at 21:45

## 2022-01-01 RX ADMIN — HYDROMORPHONE HYDROCHLORIDE 2 MG: 2 TABLET ORAL at 13:44

## 2022-01-01 RX ADMIN — DILTIAZEM HYDROCHLORIDE 30 MG: 60 TABLET, FILM COATED ORAL at 18:09

## 2022-01-01 RX ADMIN — MIDAZOLAM HYDROCHLORIDE 2 MG: 2 INJECTION, SOLUTION INTRAMUSCULAR; INTRAVENOUS at 13:30

## 2022-01-01 RX ADMIN — Medication 800 MG: at 08:58

## 2022-01-01 RX ADMIN — OLANZAPINE 5 MG: 5 TABLET, FILM COATED ORAL at 20:52

## 2022-01-01 RX ADMIN — Medication 2 MCG/KG/HR: at 17:29

## 2022-01-01 RX ADMIN — INSULIN GLARGINE 25 UNITS: 100 INJECTION, SOLUTION SUBCUTANEOUS at 09:23

## 2022-01-01 RX ADMIN — INSULIN GLARGINE 25 UNITS: 100 INJECTION, SOLUTION SUBCUTANEOUS at 22:24

## 2022-01-01 RX ADMIN — PANTOPRAZOLE SODIUM 40 MG: 40 INJECTION, POWDER, FOR SOLUTION INTRAVENOUS at 07:55

## 2022-01-01 RX ADMIN — SODIUM CHLORIDE, PRESERVATIVE FREE 5 ML: 5 INJECTION INTRAVENOUS at 21:50

## 2022-01-01 RX ADMIN — PANTOPRAZOLE SODIUM 40 MG: 40 INJECTION, POWDER, FOR SOLUTION INTRAVENOUS at 08:39

## 2022-01-01 RX ADMIN — PROPOFOL INJECTABLE EMULSION 50 MCG/KG/MIN: 10 INJECTION, EMULSION INTRAVENOUS at 01:48

## 2022-01-01 RX ADMIN — GABAPENTIN 400 MG: 400 CAPSULE ORAL at 09:40

## 2022-01-01 RX ADMIN — NIFEDIPINE 30 MG: 30 TABLET, FILM COATED, EXTENDED RELEASE ORAL at 08:51

## 2022-01-01 RX ADMIN — SODIUM CHLORIDE: 9 INJECTION, SOLUTION INTRAVENOUS at 23:02

## 2022-01-01 RX ADMIN — NIFEDIPINE 30 MG: 30 TABLET, FILM COATED, EXTENDED RELEASE ORAL at 20:33

## 2022-01-01 RX ADMIN — Medication: at 05:45

## 2022-01-01 RX ADMIN — HEPARIN SODIUM 5000 UNITS: 5000 INJECTION INTRAVENOUS; SUBCUTANEOUS at 13:39

## 2022-01-01 RX ADMIN — HEPARIN SODIUM 2600 UNITS: 1000 INJECTION INTRAVENOUS; SUBCUTANEOUS at 14:02

## 2022-01-01 RX ADMIN — GABAPENTIN 400 MG: 400 CAPSULE ORAL at 20:11

## 2022-01-01 RX ADMIN — OXYCODONE 10 MG: 5 TABLET ORAL at 13:54

## 2022-01-01 RX ADMIN — TIZANIDINE 4 MG: 4 TABLET ORAL at 14:23

## 2022-01-01 RX ADMIN — INSULIN LISPRO 6 UNITS: 100 INJECTION, SOLUTION INTRAVENOUS; SUBCUTANEOUS at 17:27

## 2022-01-01 RX ADMIN — TIZANIDINE 4 MG: 4 TABLET ORAL at 20:58

## 2022-01-01 RX ADMIN — QUETIAPINE FUMARATE 25 MG: 25 TABLET ORAL at 08:14

## 2022-01-01 RX ADMIN — ONDANSETRON HYDROCHLORIDE 4 MG: 2 INJECTION, SOLUTION INTRAMUSCULAR; INTRAVENOUS at 03:25

## 2022-01-01 RX ADMIN — METOCLOPRAMIDE 10 MG: 10 TABLET ORAL at 05:52

## 2022-01-01 RX ADMIN — PANTOPRAZOLE SODIUM 40 MG: 40 INJECTION, POWDER, FOR SOLUTION INTRAVENOUS at 08:20

## 2022-01-01 RX ADMIN — AMPICILLIN SODIUM AND SULBACTAM SODIUM 3000 MG: 2; 1 INJECTION, POWDER, FOR SOLUTION INTRAMUSCULAR; INTRAVENOUS at 16:57

## 2022-01-01 RX ADMIN — INSULIN GLARGINE 25 UNITS: 100 INJECTION, SOLUTION SUBCUTANEOUS at 21:21

## 2022-01-01 RX ADMIN — DILTIAZEM HYDROCHLORIDE 30 MG: 60 TABLET, FILM COATED ORAL at 05:02

## 2022-01-01 RX ADMIN — DIMETHICONE, OXYBENZONE, AND PADIMATE O: 2; 2.5; 6.6 STICK TOPICAL at 12:10

## 2022-01-01 RX ADMIN — HYDROMORPHONE HYDROCHLORIDE 1 MG: 1 INJECTION, SOLUTION INTRAMUSCULAR; INTRAVENOUS; SUBCUTANEOUS at 20:46

## 2022-01-01 RX ADMIN — OXYCODONE 10 MG: 5 TABLET ORAL at 09:58

## 2022-01-01 RX ADMIN — HYDROMORPHONE HYDROCHLORIDE 1 MG: 1 INJECTION, SOLUTION INTRAMUSCULAR; INTRAVENOUS; SUBCUTANEOUS at 06:17

## 2022-01-01 RX ADMIN — HEPARIN SODIUM 5000 UNITS: 5000 INJECTION INTRAVENOUS; SUBCUTANEOUS at 20:19

## 2022-01-01 RX ADMIN — SODIUM CHLORIDE, PRESERVATIVE FREE 10 ML: 5 INJECTION INTRAVENOUS at 19:58

## 2022-01-01 RX ADMIN — METOCLOPRAMIDE 10 MG: 10 TABLET ORAL at 20:52

## 2022-01-01 RX ADMIN — CHLORHEXIDINE GLUCONATE 0.12% ORAL RINSE 15 ML: 1.2 LIQUID ORAL at 10:28

## 2022-01-01 RX ADMIN — Medication 100 MCG/HR: at 12:03

## 2022-01-01 RX ADMIN — INSULIN LISPRO 4 UNITS: 100 INJECTION, SOLUTION INTRAVENOUS; SUBCUTANEOUS at 12:53

## 2022-01-01 RX ADMIN — METOCLOPRAMIDE 10 MG: 10 TABLET ORAL at 10:43

## 2022-01-01 RX ADMIN — HEPARIN SODIUM 5000 UNITS: 5000 INJECTION INTRAVENOUS; SUBCUTANEOUS at 06:46

## 2022-01-01 RX ADMIN — TIZANIDINE 8 MG: 4 TABLET ORAL at 09:11

## 2022-01-01 RX ADMIN — FAMOTIDINE 20 MG: 10 INJECTION, SOLUTION INTRAVENOUS at 08:34

## 2022-01-01 RX ADMIN — NOREPINEPHRINE BITARTRATE 10 MCG/MIN: 1 INJECTION, SOLUTION, CONCENTRATE INTRAVENOUS at 03:03

## 2022-01-01 RX ADMIN — ONDANSETRON HYDROCHLORIDE 4 MG: 2 INJECTION, SOLUTION INTRAMUSCULAR; INTRAVENOUS at 04:12

## 2022-01-01 RX ADMIN — FLUTICASONE PROPIONATE 2 SPRAY: 50 SPRAY, METERED NASAL at 21:29

## 2022-01-01 RX ADMIN — OXYCODONE 10 MG: 5 TABLET ORAL at 12:39

## 2022-01-01 RX ADMIN — DILTIAZEM HYDROCHLORIDE 30 MG: 60 TABLET, FILM COATED ORAL at 18:26

## 2022-01-01 RX ADMIN — FENTANYL CITRATE 50 MCG: 50 INJECTION INTRAMUSCULAR; INTRAVENOUS at 18:23

## 2022-01-01 RX ADMIN — ACETAMINOPHEN 650 MG: 325 TABLET ORAL at 03:39

## 2022-01-01 RX ADMIN — Medication 1.2 MCG/KG/HR: at 20:38

## 2022-01-01 RX ADMIN — SODIUM CHLORIDE, PRESERVATIVE FREE 10 ML: 5 INJECTION INTRAVENOUS at 20:37

## 2022-01-01 RX ADMIN — HEPARIN SODIUM 5000 UNITS: 5000 INJECTION INTRAVENOUS; SUBCUTANEOUS at 14:29

## 2022-01-01 RX ADMIN — GABAPENTIN 200 MG: 100 CAPSULE ORAL at 09:41

## 2022-01-01 RX ADMIN — METOPROLOL TARTRATE 5 MG: 5 INJECTION INTRAVENOUS at 22:02

## 2022-01-01 RX ADMIN — INSULIN LISPRO 9 UNITS: 100 INJECTION, SOLUTION INTRAVENOUS; SUBCUTANEOUS at 17:17

## 2022-01-01 RX ADMIN — HEPARIN SODIUM 5000 UNITS: 5000 INJECTION INTRAVENOUS; SUBCUTANEOUS at 20:42

## 2022-01-01 RX ADMIN — MUPIROCIN: 20 OINTMENT TOPICAL at 20:34

## 2022-01-01 RX ADMIN — Medication 2 MCG/KG/HR: at 18:03

## 2022-01-01 RX ADMIN — OXYCODONE HYDROCHLORIDE AND ACETAMINOPHEN 1 TABLET: 5; 325 TABLET ORAL at 03:25

## 2022-01-01 RX ADMIN — HYDROMORPHONE HYDROCHLORIDE 1 MG: 1 INJECTION, SOLUTION INTRAMUSCULAR; INTRAVENOUS; SUBCUTANEOUS at 23:32

## 2022-01-01 RX ADMIN — PROPOFOL INJECTABLE EMULSION 50 MCG/KG/MIN: 10 INJECTION, EMULSION INTRAVENOUS at 16:14

## 2022-01-01 RX ADMIN — DILTIAZEM HYDROCHLORIDE 30 MG: 60 TABLET, FILM COATED ORAL at 05:51

## 2022-01-01 RX ADMIN — Medication: at 17:46

## 2022-01-01 RX ADMIN — SODIUM CHLORIDE, PRESERVATIVE FREE 10 ML: 5 INJECTION INTRAVENOUS at 09:47

## 2022-01-01 RX ADMIN — CEFEPIME HYDROCHLORIDE 1000 MG: 1 INJECTION, POWDER, FOR SOLUTION INTRAMUSCULAR; INTRAVENOUS at 13:58

## 2022-01-01 RX ADMIN — INSULIN LISPRO 15 UNITS: 100 INJECTION, SOLUTION INTRAVENOUS; SUBCUTANEOUS at 16:03

## 2022-01-01 RX ADMIN — SODIUM CHLORIDE, PRESERVATIVE FREE 10 ML: 5 INJECTION INTRAVENOUS at 20:28

## 2022-01-01 RX ADMIN — INSULIN LISPRO 4 UNITS: 100 INJECTION, SOLUTION INTRAVENOUS; SUBCUTANEOUS at 06:09

## 2022-01-01 RX ADMIN — HEPARIN SODIUM 5000 UNITS: 5000 INJECTION INTRAVENOUS; SUBCUTANEOUS at 21:01

## 2022-01-01 RX ADMIN — Medication 1.8 MCG/KG/HR: at 06:11

## 2022-01-01 RX ADMIN — INSULIN LISPRO 3 UNITS: 100 INJECTION, SOLUTION INTRAVENOUS; SUBCUTANEOUS at 20:03

## 2022-01-01 RX ADMIN — Medication 2 MCG/KG/HR: at 22:45

## 2022-01-01 RX ADMIN — PANTOPRAZOLE SODIUM 40 MG: 40 INJECTION, POWDER, FOR SOLUTION INTRAVENOUS at 09:00

## 2022-01-01 RX ADMIN — OXYCODONE 10 MG: 5 TABLET ORAL at 09:41

## 2022-01-01 RX ADMIN — FLUCONAZOLE 100 MG: 100 TABLET ORAL at 09:01

## 2022-01-01 RX ADMIN — HYDROMORPHONE HYDROCHLORIDE 1 MG: 1 INJECTION, SOLUTION INTRAMUSCULAR; INTRAVENOUS; SUBCUTANEOUS at 17:16

## 2022-01-01 RX ADMIN — HYDROCORTISONE SODIUM SUCCINATE 50 MG: 100 INJECTION, POWDER, FOR SOLUTION INTRAMUSCULAR; INTRAVENOUS at 09:28

## 2022-01-01 RX ADMIN — PAROXETINE HYDROCHLORIDE 20 MG: 20 TABLET, FILM COATED ORAL at 09:40

## 2022-01-01 RX ADMIN — OXYCODONE 10 MG: 5 TABLET ORAL at 06:00

## 2022-01-01 RX ADMIN — HYDROMORPHONE HYDROCHLORIDE 1 MG: 1 INJECTION, SOLUTION INTRAMUSCULAR; INTRAVENOUS; SUBCUTANEOUS at 16:14

## 2022-01-01 RX ADMIN — SODIUM CHLORIDE, PRESERVATIVE FREE 10 ML: 5 INJECTION INTRAVENOUS at 21:13

## 2022-01-01 RX ADMIN — HYDROMORPHONE HYDROCHLORIDE 1 MG: 1 INJECTION, SOLUTION INTRAMUSCULAR; INTRAVENOUS; SUBCUTANEOUS at 15:07

## 2022-01-01 RX ADMIN — SODIUM CHLORIDE, PRESERVATIVE FREE 10 ML: 5 INJECTION INTRAVENOUS at 20:52

## 2022-01-01 RX ADMIN — Medication 2000 MG: at 23:19

## 2022-01-01 RX ADMIN — PANTOPRAZOLE SODIUM 40 MG: 40 TABLET, DELAYED RELEASE ORAL at 08:53

## 2022-01-01 RX ADMIN — FLUTICASONE PROPIONATE 2 SPRAY: 50 SPRAY, METERED NASAL at 19:58

## 2022-01-01 RX ADMIN — METOCLOPRAMIDE 10 MG: 10 TABLET ORAL at 11:20

## 2022-01-01 RX ADMIN — Medication 1 TABLET: at 11:46

## 2022-01-01 RX ADMIN — MIDAZOLAM HYDROCHLORIDE 1 MG: 5 INJECTION, SOLUTION INTRAMUSCULAR; INTRAVENOUS at 12:13

## 2022-01-01 RX ADMIN — METOCLOPRAMIDE 10 MG: 10 TABLET ORAL at 16:42

## 2022-01-01 RX ADMIN — GABAPENTIN 400 MG: 400 CAPSULE ORAL at 14:52

## 2022-01-01 RX ADMIN — OXYCODONE 5 MG: 5 TABLET ORAL at 06:31

## 2022-01-01 RX ADMIN — FENTANYL CITRATE 25 MCG: 50 INJECTION INTRAMUSCULAR; INTRAVENOUS at 11:46

## 2022-01-01 RX ADMIN — METOCLOPRAMIDE 10 MG: 10 TABLET ORAL at 06:09

## 2022-01-01 RX ADMIN — INSULIN LISPRO 4 UNITS: 100 INJECTION, SOLUTION INTRAVENOUS; SUBCUTANEOUS at 06:20

## 2022-01-01 RX ADMIN — HEPARIN SODIUM 5000 UNITS: 5000 INJECTION INTRAVENOUS; SUBCUTANEOUS at 06:27

## 2022-01-01 RX ADMIN — Medication: at 08:29

## 2022-01-01 RX ADMIN — Medication: at 12:05

## 2022-01-01 RX ADMIN — OXYCODONE 10 MG: 5 TABLET ORAL at 01:15

## 2022-01-01 RX ADMIN — HEPARIN SODIUM 5000 UNITS: 5000 INJECTION INTRAVENOUS; SUBCUTANEOUS at 06:37

## 2022-01-01 RX ADMIN — OLANZAPINE 5 MG: 5 TABLET, FILM COATED ORAL at 20:19

## 2022-01-01 RX ADMIN — ACETAMINOPHEN 650 MG: 325 TABLET ORAL at 20:40

## 2022-01-01 RX ADMIN — DOXYCYCLINE HYCLATE 100 MG: 100 TABLET, COATED ORAL at 08:58

## 2022-01-01 RX ADMIN — Medication 0.8 MCG/KG/HR: at 22:32

## 2022-01-01 RX ADMIN — SUCRALFATE 1 G: 1 TABLET ORAL at 05:57

## 2022-01-01 RX ADMIN — PANTOPRAZOLE SODIUM 40 MG: 40 TABLET, DELAYED RELEASE ORAL at 20:12

## 2022-01-01 RX ADMIN — OXYCODONE 10 MG: 5 TABLET ORAL at 20:52

## 2022-01-01 RX ADMIN — DOXYCYCLINE HYCLATE 100 MG: 100 TABLET, COATED ORAL at 09:11

## 2022-01-01 RX ADMIN — AMPICILLIN SODIUM AND SULBACTAM SODIUM 3000 MG: 2; 1 INJECTION, POWDER, FOR SOLUTION INTRAMUSCULAR; INTRAVENOUS at 20:13

## 2022-01-01 RX ADMIN — AMPICILLIN SODIUM AND SULBACTAM SODIUM 3000 MG: 2; 1 INJECTION, POWDER, FOR SOLUTION INTRAMUSCULAR; INTRAVENOUS at 21:25

## 2022-01-01 RX ADMIN — TIZANIDINE 4 MG: 4 TABLET ORAL at 08:26

## 2022-01-01 RX ADMIN — ONDANSETRON HYDROCHLORIDE 4 MG: 2 INJECTION, SOLUTION INTRAMUSCULAR; INTRAVENOUS at 14:24

## 2022-01-01 RX ADMIN — CARVEDILOL 12.5 MG: 6.25 TABLET, FILM COATED ORAL at 17:13

## 2022-01-01 RX ADMIN — PROMETHAZINE HYDROCHLORIDE 12.5 MG: 25 TABLET ORAL at 07:49

## 2022-01-01 RX ADMIN — TRAZODONE HYDROCHLORIDE 50 MG: 50 TABLET ORAL at 21:09

## 2022-01-01 RX ADMIN — DILTIAZEM HYDROCHLORIDE 30 MG: 60 TABLET, FILM COATED ORAL at 17:11

## 2022-01-01 RX ADMIN — Medication 400 MG: at 08:14

## 2022-01-01 RX ADMIN — INSULIN GLARGINE 25 UNITS: 100 INJECTION, SOLUTION SUBCUTANEOUS at 08:25

## 2022-01-01 RX ADMIN — Medication 1 MG/HR: at 11:06

## 2022-01-01 RX ADMIN — INSULIN LISPRO 6 UNITS: 100 INJECTION, SOLUTION INTRAVENOUS; SUBCUTANEOUS at 16:48

## 2022-01-01 RX ADMIN — Medication 1.6 MCG/KG/HR: at 13:00

## 2022-01-01 RX ADMIN — QUETIAPINE FUMARATE 100 MG: 100 TABLET ORAL at 20:44

## 2022-01-01 RX ADMIN — INSULIN LISPRO 3 UNITS: 100 INJECTION, SOLUTION INTRAVENOUS; SUBCUTANEOUS at 00:36

## 2022-01-01 RX ADMIN — CEFEPIME HYDROCHLORIDE 1000 MG: 1 INJECTION, POWDER, FOR SOLUTION INTRAMUSCULAR; INTRAVENOUS at 07:08

## 2022-01-01 RX ADMIN — FUROSEMIDE 40 MG: 40 TABLET ORAL at 05:16

## 2022-01-01 RX ADMIN — METOPROLOL TARTRATE 25 MG: 25 TABLET, FILM COATED ORAL at 07:51

## 2022-01-01 RX ADMIN — OXYCODONE 5 MG: 5 TABLET ORAL at 10:51

## 2022-01-01 RX ADMIN — INSULIN LISPRO 3 UNITS: 100 INJECTION, SOLUTION INTRAVENOUS; SUBCUTANEOUS at 04:33

## 2022-01-01 RX ADMIN — OXYCODONE 10 MG: 5 TABLET ORAL at 06:18

## 2022-01-01 RX ADMIN — HEPARIN SODIUM 5000 UNITS: 5000 INJECTION INTRAVENOUS; SUBCUTANEOUS at 21:52

## 2022-01-01 RX ADMIN — GABAPENTIN 100 MG: 100 CAPSULE ORAL at 13:54

## 2022-01-01 RX ADMIN — METOPROLOL SUCCINATE 50 MG: 50 TABLET, EXTENDED RELEASE ORAL at 09:25

## 2022-01-01 RX ADMIN — HEPARIN SODIUM 2600 UNITS: 1000 INJECTION INTRAVENOUS; SUBCUTANEOUS at 11:12

## 2022-01-01 RX ADMIN — INSULIN LISPRO 3 UNITS: 100 INJECTION, SOLUTION INTRAVENOUS; SUBCUTANEOUS at 00:02

## 2022-01-01 RX ADMIN — SODIUM CHLORIDE, PRESERVATIVE FREE 10 ML: 5 INJECTION INTRAVENOUS at 08:09

## 2022-01-01 RX ADMIN — Medication: at 14:13

## 2022-01-01 RX ADMIN — PANTOPRAZOLE SODIUM 40 MG: 40 TABLET, DELAYED RELEASE ORAL at 09:50

## 2022-01-01 RX ADMIN — Medication 1.6 MCG/KG/HR: at 19:52

## 2022-01-01 RX ADMIN — CHLORHEXIDINE GLUCONATE 0.12% ORAL RINSE 15 ML: 1.2 LIQUID ORAL at 07:51

## 2022-01-01 RX ADMIN — Medication 2 MCG/KG/HR: at 23:35

## 2022-01-01 RX ADMIN — DOXYCYCLINE HYCLATE 100 MG: 100 TABLET, COATED ORAL at 20:52

## 2022-01-01 RX ADMIN — METOCLOPRAMIDE 10 MG: 10 TABLET ORAL at 06:14

## 2022-01-01 RX ADMIN — PANTOPRAZOLE SODIUM 40 MG: 40 TABLET, DELAYED RELEASE ORAL at 21:10

## 2022-01-01 RX ADMIN — HEPARIN SODIUM 5000 UNITS: 5000 INJECTION INTRAVENOUS; SUBCUTANEOUS at 06:36

## 2022-01-01 RX ADMIN — OXYCODONE HYDROCHLORIDE AND ACETAMINOPHEN 1 TABLET: 5; 325 TABLET ORAL at 16:44

## 2022-01-01 RX ADMIN — OXYCODONE 5 MG: 5 TABLET ORAL at 11:04

## 2022-01-01 RX ADMIN — PANTOPRAZOLE SODIUM 40 MG: 40 TABLET, DELAYED RELEASE ORAL at 08:09

## 2022-01-01 RX ADMIN — DILTIAZEM HYDROCHLORIDE 30 MG: 60 TABLET, FILM COATED ORAL at 17:53

## 2022-01-01 RX ADMIN — DESMOPRESSIN ACETATE 31.52 MCG: 4 SOLUTION INTRAVENOUS at 15:34

## 2022-01-01 RX ADMIN — QUETIAPINE FUMARATE 25 MG: 25 TABLET ORAL at 16:51

## 2022-01-01 RX ADMIN — METOCLOPRAMIDE 10 MG: 10 TABLET ORAL at 16:41

## 2022-01-01 RX ADMIN — INSULIN LISPRO 4 UNITS: 100 INJECTION, SOLUTION INTRAVENOUS; SUBCUTANEOUS at 16:36

## 2022-01-01 RX ADMIN — METOPROLOL TARTRATE 12.5 MG: 25 TABLET, FILM COATED ORAL at 21:26

## 2022-01-01 RX ADMIN — Medication: at 04:00

## 2022-01-01 RX ADMIN — OXYCODONE 10 MG: 5 TABLET ORAL at 20:42

## 2022-01-01 RX ADMIN — TIZANIDINE 8 MG: 4 TABLET ORAL at 20:05

## 2022-01-01 RX ADMIN — INSULIN LISPRO 3 UNITS: 100 INJECTION, SOLUTION INTRAVENOUS; SUBCUTANEOUS at 07:55

## 2022-01-01 RX ADMIN — TRAZODONE HYDROCHLORIDE 50 MG: 50 TABLET ORAL at 20:05

## 2022-01-01 RX ADMIN — Medication 1.6 MCG/KG/HR: at 04:25

## 2022-01-01 RX ADMIN — AMPICILLIN SODIUM AND SULBACTAM SODIUM 3000 MG: 2; 1 INJECTION, POWDER, FOR SOLUTION INTRAMUSCULAR; INTRAVENOUS at 17:05

## 2022-01-01 RX ADMIN — INSULIN GLARGINE 10 UNITS: 100 INJECTION, SOLUTION SUBCUTANEOUS at 09:03

## 2022-01-01 RX ADMIN — PANTOPRAZOLE SODIUM 40 MG: 40 TABLET, DELAYED RELEASE ORAL at 21:27

## 2022-01-01 RX ADMIN — NAFCILLIN SODIUM 2000 MG: 2 INJECTION, POWDER, LYOPHILIZED, FOR SOLUTION INTRAMUSCULAR; INTRAVENOUS at 00:10

## 2022-01-01 RX ADMIN — SUCRALFATE 1 G: 1 TABLET ORAL at 17:05

## 2022-01-01 RX ADMIN — METOCLOPRAMIDE 10 MG: 10 TABLET ORAL at 20:13

## 2022-01-01 RX ADMIN — HEPARIN SODIUM 5000 UNITS: 5000 INJECTION INTRAVENOUS; SUBCUTANEOUS at 00:29

## 2022-01-01 RX ADMIN — INSULIN GLARGINE 25 UNITS: 100 INJECTION, SOLUTION SUBCUTANEOUS at 21:19

## 2022-01-01 RX ADMIN — Medication 2 MCG/KG/HR: at 21:01

## 2022-01-01 RX ADMIN — SODIUM CHLORIDE, PRESERVATIVE FREE 10 ML: 5 INJECTION INTRAVENOUS at 22:00

## 2022-01-01 RX ADMIN — METOCLOPRAMIDE 10 MG: 10 TABLET ORAL at 05:55

## 2022-01-01 RX ADMIN — SODIUM CHLORIDE, PRESERVATIVE FREE 10 ML: 5 INJECTION INTRAVENOUS at 21:15

## 2022-01-01 RX ADMIN — HEPARIN SODIUM 5000 UNITS: 5000 INJECTION INTRAVENOUS; SUBCUTANEOUS at 13:44

## 2022-01-01 RX ADMIN — INSULIN GLARGINE 10 UNITS: 100 INJECTION, SOLUTION SUBCUTANEOUS at 11:07

## 2022-01-01 RX ADMIN — METOPROLOL SUCCINATE 25 MG: 25 TABLET, EXTENDED RELEASE ORAL at 09:01

## 2022-01-01 RX ADMIN — OXYCODONE 10 MG: 5 TABLET ORAL at 12:31

## 2022-01-01 RX ADMIN — DILTIAZEM HYDROCHLORIDE 5 MG: 5 INJECTION INTRAVENOUS at 22:59

## 2022-01-01 RX ADMIN — INSULIN LISPRO 4 UNITS: 100 INJECTION, SOLUTION INTRAVENOUS; SUBCUTANEOUS at 17:49

## 2022-01-01 RX ADMIN — INSULIN LISPRO 6 UNITS: 100 INJECTION, SOLUTION INTRAVENOUS; SUBCUTANEOUS at 03:39

## 2022-01-01 RX ADMIN — AMPICILLIN SODIUM AND SULBACTAM SODIUM 3000 MG: 2; 1 INJECTION, POWDER, FOR SOLUTION INTRAMUSCULAR; INTRAVENOUS at 18:25

## 2022-01-01 RX ADMIN — CETIRIZINE HYDROCHLORIDE 5 MG: 10 TABLET ORAL at 10:05

## 2022-01-01 RX ADMIN — INSULIN LISPRO 3 UNITS: 100 INJECTION, SOLUTION INTRAVENOUS; SUBCUTANEOUS at 03:33

## 2022-01-01 RX ADMIN — PROPOFOL INJECTABLE EMULSION 50 MCG/KG/MIN: 10 INJECTION, EMULSION INTRAVENOUS at 06:44

## 2022-01-01 RX ADMIN — INSULIN GLARGINE 25 UNITS: 100 INJECTION, SOLUTION SUBCUTANEOUS at 20:51

## 2022-01-01 RX ADMIN — FENTANYL CITRATE 25 MCG: 50 INJECTION INTRAMUSCULAR; INTRAVENOUS at 12:13

## 2022-01-01 RX ADMIN — SODIUM CHLORIDE 25 ML: 9 INJECTION, SOLUTION INTRAVENOUS at 05:48

## 2022-01-01 RX ADMIN — OXYCODONE 10 MG: 5 TABLET ORAL at 12:01

## 2022-01-01 RX ADMIN — Medication 0.4 MCG/KG/HR: at 12:25

## 2022-01-01 RX ADMIN — MIDAZOLAM HYDROCHLORIDE 2 MG: 2 INJECTION, SOLUTION INTRAMUSCULAR; INTRAVENOUS at 14:20

## 2022-01-01 RX ADMIN — Medication: at 22:15

## 2022-01-01 RX ADMIN — HYDROMORPHONE HYDROCHLORIDE 1 MG: 1 INJECTION, SOLUTION INTRAMUSCULAR; INTRAVENOUS; SUBCUTANEOUS at 20:19

## 2022-01-01 RX ADMIN — FUROSEMIDE 40 MG: 40 TABLET ORAL at 15:21

## 2022-01-01 RX ADMIN — CALCIUM GLUCONATE 1000 MG: 98 INJECTION, SOLUTION INTRAVENOUS at 10:26

## 2022-01-01 RX ADMIN — GABAPENTIN 400 MG: 400 CAPSULE ORAL at 14:29

## 2022-01-01 RX ADMIN — INSULIN LISPRO 3 UNITS: 100 INJECTION, SOLUTION INTRAVENOUS; SUBCUTANEOUS at 21:06

## 2022-01-01 RX ADMIN — OXYCODONE 10 MG: 5 TABLET ORAL at 15:17

## 2022-01-01 RX ADMIN — OLANZAPINE 5 MG: 5 TABLET, FILM COATED ORAL at 19:38

## 2022-01-01 RX ADMIN — AMPICILLIN SODIUM AND SULBACTAM SODIUM 3000 MG: 2; 1 INJECTION, POWDER, FOR SOLUTION INTRAMUSCULAR; INTRAVENOUS at 09:09

## 2022-01-01 RX ADMIN — HYDROMORPHONE HYDROCHLORIDE 1 MG: 1 INJECTION, SOLUTION INTRAMUSCULAR; INTRAVENOUS; SUBCUTANEOUS at 10:39

## 2022-01-01 RX ADMIN — SODIUM CHLORIDE, PRESERVATIVE FREE 10 ML: 5 INJECTION INTRAVENOUS at 08:35

## 2022-01-01 RX ADMIN — SODIUM CHLORIDE, PRESERVATIVE FREE 10 ML: 5 INJECTION INTRAVENOUS at 21:12

## 2022-01-01 RX ADMIN — PROPOFOL 30 MG: 10 INJECTION, EMULSION INTRAVENOUS at 08:08

## 2022-01-01 RX ADMIN — HEPARIN SODIUM 5000 UNITS: 5000 INJECTION INTRAVENOUS; SUBCUTANEOUS at 20:33

## 2022-01-01 RX ADMIN — METOCLOPRAMIDE 10 MG: 10 TABLET ORAL at 20:01

## 2022-01-01 RX ADMIN — PANTOPRAZOLE SODIUM 40 MG: 40 INJECTION, POWDER, FOR SOLUTION INTRAVENOUS at 07:40

## 2022-01-01 RX ADMIN — PANTOPRAZOLE SODIUM 40 MG: 40 TABLET, DELAYED RELEASE ORAL at 21:12

## 2022-01-01 RX ADMIN — HEPARIN SODIUM 5000 UNITS: 5000 INJECTION INTRAVENOUS; SUBCUTANEOUS at 20:40

## 2022-01-01 RX ADMIN — INSULIN LISPRO 6 UNITS: 100 INJECTION, SOLUTION INTRAVENOUS; SUBCUTANEOUS at 17:09

## 2022-01-01 RX ADMIN — INSULIN LISPRO 3 UNITS: 100 INJECTION, SOLUTION INTRAVENOUS; SUBCUTANEOUS at 06:12

## 2022-01-01 RX ADMIN — INSULIN LISPRO 3 UNITS: 100 INJECTION, SOLUTION INTRAVENOUS; SUBCUTANEOUS at 08:22

## 2022-01-01 RX ADMIN — Medication: at 20:28

## 2022-01-01 RX ADMIN — CARVEDILOL 12.5 MG: 6.25 TABLET, FILM COATED ORAL at 09:55

## 2022-01-01 RX ADMIN — PANTOPRAZOLE SODIUM 40 MG: 40 TABLET, DELAYED RELEASE ORAL at 20:11

## 2022-01-01 RX ADMIN — OXYCODONE 10 MG: 5 TABLET ORAL at 09:27

## 2022-01-01 RX ADMIN — NIFEDIPINE 30 MG: 30 TABLET, FILM COATED, EXTENDED RELEASE ORAL at 09:41

## 2022-01-01 RX ADMIN — PAROXETINE HYDROCHLORIDE 20 MG: 20 TABLET, FILM COATED ORAL at 09:58

## 2022-01-01 RX ADMIN — HYDROMORPHONE HYDROCHLORIDE 0.5 MG: 2 TABLET ORAL at 08:46

## 2022-01-01 RX ADMIN — HEPARIN SODIUM 5000 UNITS: 5000 INJECTION INTRAVENOUS; SUBCUTANEOUS at 14:08

## 2022-01-01 RX ADMIN — MIDAZOLAM HYDROCHLORIDE 2 MG: 2 INJECTION, SOLUTION INTRAMUSCULAR; INTRAVENOUS at 01:36

## 2022-01-01 RX ADMIN — HYDROMORPHONE HYDROCHLORIDE 0.5 MG: 2 TABLET ORAL at 14:11

## 2022-01-01 RX ADMIN — HEPARIN SODIUM 5000 UNITS: 5000 INJECTION INTRAVENOUS; SUBCUTANEOUS at 06:07

## 2022-01-01 RX ADMIN — VASOPRESSIN 0.03 UNITS/MIN: 20 INJECTION INTRAVENOUS at 05:22

## 2022-01-01 RX ADMIN — PANTOPRAZOLE SODIUM 40 MG: 40 TABLET, DELAYED RELEASE ORAL at 19:58

## 2022-01-01 RX ADMIN — ONDANSETRON HYDROCHLORIDE 4 MG: 2 INJECTION, SOLUTION INTRAMUSCULAR; INTRAVENOUS at 16:05

## 2022-01-01 RX ADMIN — Medication: at 11:21

## 2022-01-01 RX ADMIN — SODIUM CHLORIDE: 9 INJECTION, SOLUTION INTRAVENOUS at 21:00

## 2022-01-01 RX ADMIN — NAFCILLIN SODIUM 2000 MG: 2 INJECTION, POWDER, LYOPHILIZED, FOR SOLUTION INTRAMUSCULAR; INTRAVENOUS at 12:16

## 2022-01-01 RX ADMIN — OXYCODONE 10 MG: 5 TABLET ORAL at 21:09

## 2022-01-01 RX ADMIN — METOPROLOL TARTRATE 25 MG: 25 TABLET, FILM COATED ORAL at 20:19

## 2022-01-01 RX ADMIN — AMPICILLIN SODIUM AND SULBACTAM SODIUM 3000 MG: 2; 1 INJECTION, POWDER, FOR SOLUTION INTRAMUSCULAR; INTRAVENOUS at 05:08

## 2022-01-01 RX ADMIN — Medication: at 06:31

## 2022-01-01 RX ADMIN — Medication 1.6 MCG/KG/HR: at 19:47

## 2022-01-01 RX ADMIN — SODIUM CHLORIDE 3.5 UNITS/HR: 9 INJECTION, SOLUTION INTRAVENOUS at 23:43

## 2022-01-01 RX ADMIN — GABAPENTIN 400 MG: 400 CAPSULE ORAL at 16:19

## 2022-01-01 RX ADMIN — METOCLOPRAMIDE 10 MG: 10 TABLET ORAL at 06:36

## 2022-01-01 RX ADMIN — SODIUM CHLORIDE: 9 INJECTION, SOLUTION INTRAVENOUS at 10:40

## 2022-01-01 RX ADMIN — OXYCODONE 10 MG: 5 TABLET ORAL at 00:54

## 2022-01-01 RX ADMIN — INSULIN LISPRO 9 UNITS: 100 INJECTION, SOLUTION INTRAVENOUS; SUBCUTANEOUS at 11:05

## 2022-01-01 RX ADMIN — OLANZAPINE 5 MG: 5 TABLET, FILM COATED ORAL at 19:57

## 2022-01-01 RX ADMIN — Medication 1.5 MCG/KG/HR: at 02:15

## 2022-01-01 RX ADMIN — EPOETIN ALFA-EPBX 10000 UNITS: 10000 INJECTION, SOLUTION INTRAVENOUS; SUBCUTANEOUS at 11:08

## 2022-01-01 RX ADMIN — INSULIN GLARGINE 55 UNITS: 100 INJECTION, SOLUTION SUBCUTANEOUS at 08:16

## 2022-01-01 RX ADMIN — Medication: at 08:58

## 2022-01-01 RX ADMIN — HEPARIN SODIUM 1000 UNITS/HR: 10000 INJECTION INTRAVENOUS; SUBCUTANEOUS at 15:40

## 2022-01-01 RX ADMIN — FLUCONAZOLE 100 MG: 100 TABLET ORAL at 08:26

## 2022-01-01 RX ADMIN — Medication: at 15:35

## 2022-01-01 RX ADMIN — ONDANSETRON HYDROCHLORIDE 4 MG: 2 INJECTION, SOLUTION INTRAMUSCULAR; INTRAVENOUS at 11:29

## 2022-01-01 RX ADMIN — INSULIN LISPRO 3 UNITS: 100 INJECTION, SOLUTION INTRAVENOUS; SUBCUTANEOUS at 16:41

## 2022-01-01 RX ADMIN — METOCLOPRAMIDE 10 MG: 10 TABLET ORAL at 20:43

## 2022-01-01 RX ADMIN — HEPARIN SODIUM 5000 UNITS: 5000 INJECTION INTRAVENOUS; SUBCUTANEOUS at 05:27

## 2022-01-01 RX ADMIN — SUCRALFATE 1 G: 1 TABLET ORAL at 17:11

## 2022-01-01 RX ADMIN — OXYCODONE 7.5 MG: 15 TABLET ORAL at 21:03

## 2022-01-01 RX ADMIN — Medication 1.6 MCG/KG/HR: at 11:00

## 2022-01-01 RX ADMIN — PAROXETINE HYDROCHLORIDE 10 MG: 20 TABLET, FILM COATED ORAL at 09:50

## 2022-01-01 RX ADMIN — GABAPENTIN 300 MG: 300 CAPSULE ORAL at 09:49

## 2022-01-01 RX ADMIN — SODIUM CHLORIDE, PRESERVATIVE FREE 10 ML: 5 INJECTION INTRAVENOUS at 20:29

## 2022-01-01 RX ADMIN — INSULIN GLARGINE 25 UNITS: 100 INJECTION, SOLUTION SUBCUTANEOUS at 20:10

## 2022-01-01 RX ADMIN — LINEZOLID 600 MG: 600 INJECTION, SOLUTION INTRAVENOUS at 12:40

## 2022-01-01 RX ADMIN — METOCLOPRAMIDE 10 MG: 10 TABLET ORAL at 16:29

## 2022-01-01 RX ADMIN — HEPARIN SODIUM 5000 UNITS: 5000 INJECTION INTRAVENOUS; SUBCUTANEOUS at 05:52

## 2022-01-01 RX ADMIN — ACETAMINOPHEN 650 MG: 325 TABLET ORAL at 08:50

## 2022-01-01 RX ADMIN — INSULIN LISPRO 4 UNITS: 100 INJECTION, SOLUTION INTRAVENOUS; SUBCUTANEOUS at 09:55

## 2022-01-01 RX ADMIN — PIPERACILLIN AND TAZOBACTAM 3375 MG: 3; .375 INJECTION, POWDER, LYOPHILIZED, FOR SOLUTION INTRAVENOUS at 08:39

## 2022-01-01 RX ADMIN — MUPIROCIN: 20 OINTMENT TOPICAL at 20:07

## 2022-01-01 RX ADMIN — Medication: at 11:29

## 2022-01-01 RX ADMIN — INSULIN LISPRO 2 UNITS: 100 INJECTION, SOLUTION INTRAVENOUS; SUBCUTANEOUS at 20:18

## 2022-01-01 RX ADMIN — HYDROMORPHONE HYDROCHLORIDE 1 MG: 1 INJECTION, SOLUTION INTRAMUSCULAR; INTRAVENOUS; SUBCUTANEOUS at 11:48

## 2022-01-01 RX ADMIN — SODIUM PHOSPHATE, MONOBASIC, MONOHYDRATE 12 MMOL: 276; 142 INJECTION, SOLUTION INTRAVENOUS at 04:01

## 2022-01-01 RX ADMIN — FENTANYL CITRATE 50 MCG: 50 INJECTION INTRAMUSCULAR; INTRAVENOUS at 10:14

## 2022-01-01 RX ADMIN — Medication: at 20:00

## 2022-01-01 RX ADMIN — ONDANSETRON 4 MG: 2 INJECTION INTRAMUSCULAR; INTRAVENOUS at 12:16

## 2022-01-01 RX ADMIN — FAMOTIDINE 20 MG: 10 INJECTION, SOLUTION INTRAVENOUS at 08:01

## 2022-01-01 RX ADMIN — Medication 50 MCG/HR: at 17:03

## 2022-01-01 RX ADMIN — Medication 1 TABLET: at 11:45

## 2022-01-01 RX ADMIN — TIZANIDINE 8 MG: 4 TABLET ORAL at 20:09

## 2022-01-01 RX ADMIN — SODIUM CHLORIDE, PRESERVATIVE FREE 10 ML: 5 INJECTION INTRAVENOUS at 08:12

## 2022-01-01 RX ADMIN — SODIUM CHLORIDE: 9 INJECTION, SOLUTION INTRAVENOUS at 20:17

## 2022-01-01 RX ADMIN — SUCRALFATE 1 G: 1 TABLET ORAL at 11:38

## 2022-01-01 RX ADMIN — NAFCILLIN SODIUM 2000 MG: 2 INJECTION, POWDER, LYOPHILIZED, FOR SOLUTION INTRAMUSCULAR; INTRAVENOUS at 14:42

## 2022-01-01 RX ADMIN — FUROSEMIDE 40 MG: 40 TABLET ORAL at 06:17

## 2022-01-01 RX ADMIN — NOREPINEPHRINE BITARTRATE 8 MCG/MIN: 1 INJECTION, SOLUTION, CONCENTRATE INTRAVENOUS at 14:41

## 2022-01-01 RX ADMIN — INSULIN LISPRO 3 UNITS: 100 INJECTION, SOLUTION INTRAVENOUS; SUBCUTANEOUS at 09:50

## 2022-01-01 RX ADMIN — METOCLOPRAMIDE 10 MG: 10 TABLET ORAL at 05:26

## 2022-01-01 RX ADMIN — MIDAZOLAM HYDROCHLORIDE 2 MG: 1 INJECTION, SOLUTION INTRAMUSCULAR; INTRAVENOUS at 15:20

## 2022-01-01 RX ADMIN — HYDROMORPHONE HYDROCHLORIDE 2 MG: 2 TABLET ORAL at 08:53

## 2022-01-01 RX ADMIN — METOCLOPRAMIDE 10 MG: 10 TABLET ORAL at 16:51

## 2022-01-01 RX ADMIN — FENTANYL CITRATE 50 MCG: 50 INJECTION INTRAMUSCULAR; INTRAVENOUS at 03:30

## 2022-01-01 RX ADMIN — ALBUMIN (HUMAN) 12.5 G: 0.25 INJECTION, SOLUTION INTRAVENOUS at 18:52

## 2022-01-01 RX ADMIN — INSULIN LISPRO 12 UNITS: 100 INJECTION, SOLUTION INTRAVENOUS; SUBCUTANEOUS at 12:16

## 2022-01-01 RX ADMIN — TIZANIDINE 8 MG: 4 TABLET ORAL at 07:52

## 2022-01-01 RX ADMIN — INSULIN LISPRO 3 UNITS: 100 INJECTION, SOLUTION INTRAVENOUS; SUBCUTANEOUS at 12:19

## 2022-01-01 RX ADMIN — AMPICILLIN SODIUM AND SULBACTAM SODIUM 3000 MG: 2; 1 INJECTION, POWDER, FOR SOLUTION INTRAMUSCULAR; INTRAVENOUS at 10:47

## 2022-01-01 RX ADMIN — INSULIN LISPRO 6 UNITS: 100 INJECTION, SOLUTION INTRAVENOUS; SUBCUTANEOUS at 16:55

## 2022-01-01 RX ADMIN — METOCLOPRAMIDE 10 MG: 10 TABLET ORAL at 21:09

## 2022-01-01 RX ADMIN — SODIUM CHLORIDE 5.1 UNITS/HR: 9 INJECTION, SOLUTION INTRAVENOUS at 22:03

## 2022-01-01 RX ADMIN — Medication: at 14:15

## 2022-01-01 RX ADMIN — INSULIN LISPRO 6 UNITS: 100 INJECTION, SOLUTION INTRAVENOUS; SUBCUTANEOUS at 11:32

## 2022-01-01 RX ADMIN — SUCRALFATE 1 G: 1 TABLET ORAL at 12:07

## 2022-01-01 RX ADMIN — OXYCODONE 10 MG: 5 TABLET ORAL at 17:05

## 2022-01-01 RX ADMIN — HYDROMORPHONE HYDROCHLORIDE 1 MG: 1 INJECTION, SOLUTION INTRAMUSCULAR; INTRAVENOUS; SUBCUTANEOUS at 07:45

## 2022-01-01 RX ADMIN — INSULIN LISPRO 6 UNITS: 100 INJECTION, SOLUTION INTRAVENOUS; SUBCUTANEOUS at 14:18

## 2022-01-01 RX ADMIN — Medication 2 MCG/KG/HR: at 06:57

## 2022-01-01 RX ADMIN — NOREPINEPHRINE BITARTRATE 10 MCG/MIN: 1 INJECTION, SOLUTION, CONCENTRATE INTRAVENOUS at 09:37

## 2022-01-01 RX ADMIN — INSULIN LISPRO 6 UNITS: 100 INJECTION, SOLUTION INTRAVENOUS; SUBCUTANEOUS at 23:52

## 2022-01-01 RX ADMIN — TAMSULOSIN HYDROCHLORIDE 0.4 MG: 0.4 CAPSULE ORAL at 20:48

## 2022-01-01 RX ADMIN — OXYCODONE HYDROCHLORIDE AND ACETAMINOPHEN 1 TABLET: 5; 325 TABLET ORAL at 05:32

## 2022-01-01 RX ADMIN — HEPARIN SODIUM 5000 UNITS: 5000 INJECTION INTRAVENOUS; SUBCUTANEOUS at 05:19

## 2022-01-01 RX ADMIN — INSULIN LISPRO 4 UNITS: 100 INJECTION, SOLUTION INTRAVENOUS; SUBCUTANEOUS at 18:04

## 2022-01-01 RX ADMIN — Medication: at 08:12

## 2022-01-01 RX ADMIN — PROPOFOL INJECTABLE EMULSION 30 MCG/KG/MIN: 10 INJECTION, EMULSION INTRAVENOUS at 16:55

## 2022-01-01 RX ADMIN — HEPARIN SODIUM 5000 UNITS: 5000 INJECTION INTRAVENOUS; SUBCUTANEOUS at 22:18

## 2022-01-01 RX ADMIN — METOCLOPRAMIDE 10 MG: 10 TABLET ORAL at 21:49

## 2022-01-01 RX ADMIN — INSULIN GLARGINE 25 UNITS: 100 INJECTION, SOLUTION SUBCUTANEOUS at 20:28

## 2022-01-01 RX ADMIN — Medication: at 09:08

## 2022-01-01 RX ADMIN — DILTIAZEM HYDROCHLORIDE 30 MG: 60 TABLET, FILM COATED ORAL at 23:52

## 2022-01-01 RX ADMIN — PANTOPRAZOLE SODIUM 40 MG: 40 INJECTION, POWDER, FOR SOLUTION INTRAVENOUS at 20:25

## 2022-01-01 RX ADMIN — FLUTICASONE PROPIONATE 2 SPRAY: 50 SPRAY, METERED NASAL at 21:28

## 2022-01-01 RX ADMIN — PROPOFOL 20 MCG/KG/MIN: 10 INJECTION, EMULSION INTRAVENOUS at 17:30

## 2022-01-01 RX ADMIN — METOCLOPRAMIDE 10 MG: 10 TABLET ORAL at 05:22

## 2022-01-01 RX ADMIN — CALCIUM CHLORIDE 1000 MG: 100 INJECTION, SOLUTION INTRAVENOUS; INTRAVENTRICULAR at 11:41

## 2022-01-01 RX ADMIN — INSULIN LISPRO 9 UNITS: 100 INJECTION, SOLUTION INTRAVENOUS; SUBCUTANEOUS at 12:19

## 2022-01-01 RX ADMIN — INSULIN LISPRO 3 UNITS: 100 INJECTION, SOLUTION INTRAVENOUS; SUBCUTANEOUS at 06:08

## 2022-01-01 RX ADMIN — OXYCODONE 5 MG: 5 TABLET ORAL at 11:20

## 2022-01-01 RX ADMIN — INSULIN LISPRO 3 UNITS: 100 INJECTION, SOLUTION INTRAVENOUS; SUBCUTANEOUS at 03:54

## 2022-01-01 RX ADMIN — INSULIN LISPRO 3 UNITS: 100 INJECTION, SOLUTION INTRAVENOUS; SUBCUTANEOUS at 15:36

## 2022-01-01 RX ADMIN — HEPARIN SODIUM 5000 UNITS: 5000 INJECTION INTRAVENOUS; SUBCUTANEOUS at 06:16

## 2022-01-01 RX ADMIN — INSULIN LISPRO 3 UNITS: 100 INJECTION, SOLUTION INTRAVENOUS; SUBCUTANEOUS at 20:45

## 2022-01-01 RX ADMIN — PROPOFOL INJECTABLE EMULSION 30 MCG/KG/MIN: 10 INJECTION, EMULSION INTRAVENOUS at 02:32

## 2022-01-01 RX ADMIN — SODIUM CHLORIDE, PRESERVATIVE FREE 10 ML: 5 INJECTION INTRAVENOUS at 08:33

## 2022-01-01 RX ADMIN — OXYCODONE HYDROCHLORIDE AND ACETAMINOPHEN 1 TABLET: 5; 325 TABLET ORAL at 20:18

## 2022-01-01 RX ADMIN — DIGOXIN 125 MCG: 0.25 INJECTION INTRAMUSCULAR; INTRAVENOUS at 05:15

## 2022-01-01 RX ADMIN — Medication 1 TABLET: at 11:20

## 2022-01-01 RX ADMIN — TAMSULOSIN HYDROCHLORIDE 0.4 MG: 0.4 CAPSULE ORAL at 21:02

## 2022-01-01 RX ADMIN — TAMSULOSIN HYDROCHLORIDE 0.4 MG: 0.4 CAPSULE ORAL at 21:11

## 2022-01-01 RX ADMIN — INSULIN LISPRO 6 UNITS: 100 INJECTION, SOLUTION INTRAVENOUS; SUBCUTANEOUS at 10:49

## 2022-01-01 RX ADMIN — OXYCODONE HYDROCHLORIDE AND ACETAMINOPHEN 1 TABLET: 5; 325 TABLET ORAL at 10:20

## 2022-01-01 RX ADMIN — Medication 2000 MG: at 22:41

## 2022-01-01 RX ADMIN — HYDROMORPHONE HYDROCHLORIDE 2 MG: 2 TABLET ORAL at 12:48

## 2022-01-01 RX ADMIN — TAMSULOSIN HYDROCHLORIDE 0.4 MG: 0.4 CAPSULE ORAL at 21:20

## 2022-01-01 RX ADMIN — Medication 2 MG/HR: at 12:25

## 2022-01-01 RX ADMIN — MIDAZOLAM HYDROCHLORIDE 2 MG: 2 INJECTION, SOLUTION INTRAMUSCULAR; INTRAVENOUS at 03:31

## 2022-01-01 RX ADMIN — FAMOTIDINE 20 MG: 10 INJECTION, SOLUTION INTRAVENOUS at 07:58

## 2022-01-01 RX ADMIN — METOPROLOL TARTRATE 12.5 MG: 25 TABLET, FILM COATED ORAL at 09:26

## 2022-01-01 RX ADMIN — HYDROMORPHONE HYDROCHLORIDE 1 MG: 1 INJECTION, SOLUTION INTRAMUSCULAR; INTRAVENOUS; SUBCUTANEOUS at 16:18

## 2022-01-01 RX ADMIN — HEPARIN SODIUM 5000 UNITS: 5000 INJECTION INTRAVENOUS; SUBCUTANEOUS at 14:51

## 2022-01-01 RX ADMIN — SENNOSIDES 17.2 MG: 8.6 TABLET, COATED ORAL at 21:03

## 2022-01-01 RX ADMIN — ACETAMINOPHEN 650 MG: 325 TABLET ORAL at 10:30

## 2022-01-01 RX ADMIN — CHLORHEXIDINE GLUCONATE 0.12% ORAL RINSE 15 ML: 1.2 LIQUID ORAL at 20:09

## 2022-01-01 RX ADMIN — DILTIAZEM HYDROCHLORIDE 30 MG: 60 TABLET, FILM COATED ORAL at 06:58

## 2022-01-01 RX ADMIN — HEPARIN SODIUM 5000 UNITS: 5000 INJECTION INTRAVENOUS; SUBCUTANEOUS at 05:21

## 2022-01-01 RX ADMIN — INSULIN GLARGINE 55 UNITS: 100 INJECTION, SOLUTION SUBCUTANEOUS at 21:14

## 2022-01-01 RX ADMIN — TAMSULOSIN HYDROCHLORIDE 0.4 MG: 0.4 CAPSULE ORAL at 20:52

## 2022-01-01 RX ADMIN — INSULIN LISPRO 3 UNITS: 100 INJECTION, SOLUTION INTRAVENOUS; SUBCUTANEOUS at 21:24

## 2022-01-01 RX ADMIN — METOPROLOL SUCCINATE 50 MG: 50 TABLET, EXTENDED RELEASE ORAL at 09:41

## 2022-01-01 RX ADMIN — OXYCODONE HYDROCHLORIDE AND ACETAMINOPHEN 1 TABLET: 5; 325 TABLET ORAL at 18:56

## 2022-01-01 RX ADMIN — SODIUM CHLORIDE, PRESERVATIVE FREE 10 ML: 5 INJECTION INTRAVENOUS at 11:54

## 2022-01-01 RX ADMIN — INSULIN LISPRO 2 UNITS: 100 INJECTION, SOLUTION INTRAVENOUS; SUBCUTANEOUS at 20:42

## 2022-01-01 RX ADMIN — SUCRALFATE 1 G: 1 TABLET ORAL at 17:13

## 2022-01-01 RX ADMIN — Medication 100 MCG/HR: at 16:10

## 2022-01-01 RX ADMIN — NAFCILLIN SODIUM 2000 MG: 2 INJECTION, POWDER, LYOPHILIZED, FOR SOLUTION INTRAMUSCULAR; INTRAVENOUS at 22:19

## 2022-01-01 RX ADMIN — INSULIN LISPRO 6 UNITS: 100 INJECTION, SOLUTION INTRAVENOUS; SUBCUTANEOUS at 16:45

## 2022-01-01 RX ADMIN — INSULIN LISPRO 2 UNITS: 100 INJECTION, SOLUTION INTRAVENOUS; SUBCUTANEOUS at 20:56

## 2022-01-01 RX ADMIN — QUETIAPINE FUMARATE 50 MG: 25 TABLET ORAL at 20:48

## 2022-01-01 RX ADMIN — GABAPENTIN 400 MG: 400 CAPSULE ORAL at 08:58

## 2022-01-01 RX ADMIN — METOCLOPRAMIDE 10 MG: 10 TABLET ORAL at 06:46

## 2022-01-01 RX ADMIN — INSULIN LISPRO 3 UNITS: 100 INJECTION, SOLUTION INTRAVENOUS; SUBCUTANEOUS at 12:15

## 2022-01-01 RX ADMIN — PHENYLEPHRINE HYDROCHLORIDE 100 MCG: 10 INJECTION INTRAVENOUS at 13:53

## 2022-01-01 RX ADMIN — HEPARIN SODIUM 5000 UNITS: 5000 INJECTION INTRAVENOUS; SUBCUTANEOUS at 20:20

## 2022-01-01 RX ADMIN — HEPARIN SODIUM 5000 UNITS: 5000 INJECTION INTRAVENOUS; SUBCUTANEOUS at 15:22

## 2022-01-01 RX ADMIN — CALCIUM GLUCONATE 1000 MG: 98 INJECTION, SOLUTION INTRAVENOUS at 09:12

## 2022-01-01 RX ADMIN — OXYCODONE HYDROCHLORIDE AND ACETAMINOPHEN 1 TABLET: 5; 325 TABLET ORAL at 16:32

## 2022-01-01 RX ADMIN — INSULIN GLARGINE 15 UNITS: 100 INJECTION, SOLUTION SUBCUTANEOUS at 09:09

## 2022-01-01 RX ADMIN — CALCIUM CHLORIDE 1000 MG: 100 INJECTION, SOLUTION INTRAVENOUS; INTRAVENTRICULAR at 02:58

## 2022-01-01 RX ADMIN — TAMSULOSIN HYDROCHLORIDE 0.4 MG: 0.4 CAPSULE ORAL at 20:19

## 2022-01-01 RX ADMIN — CARVEDILOL 12.5 MG: 6.25 TABLET, FILM COATED ORAL at 16:13

## 2022-01-01 RX ADMIN — CHLORHEXIDINE GLUCONATE 0.12% ORAL RINSE 15 ML: 1.2 LIQUID ORAL at 20:17

## 2022-01-01 RX ADMIN — HEPARIN SODIUM 5000 UNITS: 5000 INJECTION INTRAVENOUS; SUBCUTANEOUS at 14:53

## 2022-01-01 RX ADMIN — HYDROMORPHONE HYDROCHLORIDE 2 MG: 2 TABLET ORAL at 21:18

## 2022-01-01 RX ADMIN — PANTOPRAZOLE SODIUM 40 MG: 40 INJECTION, POWDER, FOR SOLUTION INTRAVENOUS at 20:18

## 2022-01-01 RX ADMIN — Medication 1 TABLET: at 11:49

## 2022-01-01 RX ADMIN — HEPARIN SODIUM 5000 UNITS: 5000 INJECTION INTRAVENOUS; SUBCUTANEOUS at 06:09

## 2022-01-01 RX ADMIN — INSULIN LISPRO 3 UNITS: 100 INJECTION, SOLUTION INTRAVENOUS; SUBCUTANEOUS at 11:49

## 2022-01-01 RX ADMIN — INSULIN LISPRO 6 UNITS: 100 INJECTION, SOLUTION INTRAVENOUS; SUBCUTANEOUS at 16:41

## 2022-01-01 RX ADMIN — ACETAMINOPHEN 650 MG: 325 TABLET ORAL at 23:27

## 2022-01-01 RX ADMIN — ONDANSETRON HYDROCHLORIDE 4 MG: 2 INJECTION, SOLUTION INTRAMUSCULAR; INTRAVENOUS at 13:20

## 2022-01-01 RX ADMIN — Medication 2 MCG/KG/HR: at 09:26

## 2022-01-01 RX ADMIN — INSULIN LISPRO 3 UNITS: 100 INJECTION, SOLUTION INTRAVENOUS; SUBCUTANEOUS at 04:19

## 2022-01-01 RX ADMIN — HEPARIN SODIUM 5000 UNITS: 5000 INJECTION INTRAVENOUS; SUBCUTANEOUS at 20:29

## 2022-01-01 RX ADMIN — NOREPINEPHRINE BITARTRATE 4 MCG/MIN: 1 INJECTION, SOLUTION, CONCENTRATE INTRAVENOUS at 19:04

## 2022-01-01 RX ADMIN — PANTOPRAZOLE SODIUM 40 MG: 40 TABLET, DELAYED RELEASE ORAL at 06:07

## 2022-01-01 RX ADMIN — HYDROMORPHONE HYDROCHLORIDE 2 MG: 2 TABLET ORAL at 21:09

## 2022-01-01 RX ADMIN — Medication: at 09:07

## 2022-01-01 RX ADMIN — HYDROCORTISONE SODIUM SUCCINATE 50 MG: 100 INJECTION, POWDER, FOR SOLUTION INTRAMUSCULAR; INTRAVENOUS at 08:33

## 2022-01-01 RX ADMIN — Medication: at 13:15

## 2022-01-01 RX ADMIN — Medication: at 09:25

## 2022-01-01 RX ADMIN — PROCHLORPERAZINE MALEATE 5 MG: 5 TABLET ORAL at 09:38

## 2022-01-01 RX ADMIN — Medication: at 19:25

## 2022-01-01 RX ADMIN — INSULIN LISPRO 6 UNITS: 100 INJECTION, SOLUTION INTRAVENOUS; SUBCUTANEOUS at 17:50

## 2022-01-01 RX ADMIN — GABAPENTIN 200 MG: 100 CAPSULE ORAL at 12:53

## 2022-01-01 RX ADMIN — AMPICILLIN SODIUM AND SULBACTAM SODIUM 3000 MG: 2; 1 INJECTION, POWDER, FOR SOLUTION INTRAMUSCULAR; INTRAVENOUS at 21:16

## 2022-01-01 RX ADMIN — MIDAZOLAM HYDROCHLORIDE 2 MG: 1 INJECTION, SOLUTION INTRAMUSCULAR; INTRAVENOUS at 19:58

## 2022-01-01 RX ADMIN — FENTANYL CITRATE 25 MCG: 50 INJECTION, SOLUTION INTRAMUSCULAR; INTRAVENOUS at 15:58

## 2022-01-01 RX ADMIN — Medication 400 MG: at 09:58

## 2022-01-01 RX ADMIN — METOPROLOL SUCCINATE 50 MG: 50 TABLET, EXTENDED RELEASE ORAL at 20:42

## 2022-01-01 RX ADMIN — SODIUM CHLORIDE, PRESERVATIVE FREE 10 ML: 5 INJECTION INTRAVENOUS at 08:11

## 2022-01-01 RX ADMIN — HYDROMORPHONE HYDROCHLORIDE 1 MG: 1 INJECTION, SOLUTION INTRAMUSCULAR; INTRAVENOUS; SUBCUTANEOUS at 08:19

## 2022-01-01 RX ADMIN — NIFEDIPINE 30 MG: 30 TABLET, FILM COATED, EXTENDED RELEASE ORAL at 08:14

## 2022-01-01 RX ADMIN — INSULIN LISPRO 2 UNITS: 100 INJECTION, SOLUTION INTRAVENOUS; SUBCUTANEOUS at 21:01

## 2022-01-01 RX ADMIN — SUCRALFATE 1 G: 1 TABLET ORAL at 20:00

## 2022-01-01 RX ADMIN — HEPARIN SODIUM 5000 UNITS: 5000 INJECTION INTRAVENOUS; SUBCUTANEOUS at 21:20

## 2022-01-01 RX ADMIN — PANTOPRAZOLE SODIUM 40 MG: 40 TABLET, DELAYED RELEASE ORAL at 08:14

## 2022-01-01 RX ADMIN — PANTOPRAZOLE SODIUM 40 MG: 40 INJECTION, POWDER, FOR SOLUTION INTRAVENOUS at 11:31

## 2022-01-01 RX ADMIN — INSULIN GLARGINE 15 UNITS: 100 INJECTION, SOLUTION SUBCUTANEOUS at 10:50

## 2022-01-01 RX ADMIN — PIPERACILLIN AND TAZOBACTAM 3375 MG: 3; .375 INJECTION, POWDER, LYOPHILIZED, FOR SOLUTION INTRAVENOUS at 01:16

## 2022-01-01 RX ADMIN — HEPARIN SODIUM 5000 UNITS: 5000 INJECTION INTRAVENOUS; SUBCUTANEOUS at 05:41

## 2022-01-01 RX ADMIN — OXYCODONE 7.5 MG: 15 TABLET ORAL at 17:17

## 2022-01-01 RX ADMIN — SUCRALFATE 1 G: 1 TABLET ORAL at 19:43

## 2022-01-01 RX ADMIN — HYDROMORPHONE HYDROCHLORIDE 1 MG: 1 INJECTION, SOLUTION INTRAMUSCULAR; INTRAVENOUS; SUBCUTANEOUS at 20:24

## 2022-01-01 RX ADMIN — Medication 400 MG: at 08:09

## 2022-01-01 RX ADMIN — METOPROLOL SUCCINATE 50 MG: 50 TABLET, EXTENDED RELEASE ORAL at 20:33

## 2022-01-01 RX ADMIN — FUROSEMIDE 40 MG: 40 TABLET ORAL at 15:56

## 2022-01-01 RX ADMIN — INSULIN GLARGINE 10 UNITS: 100 INJECTION, SOLUTION SUBCUTANEOUS at 08:52

## 2022-01-01 RX ADMIN — PAROXETINE HYDROCHLORIDE 20 MG: 20 TABLET, FILM COATED ORAL at 09:32

## 2022-01-01 RX ADMIN — Medication 3 MG/HR: at 13:27

## 2022-01-01 RX ADMIN — INSULIN LISPRO 3 UNITS: 100 INJECTION, SOLUTION INTRAVENOUS; SUBCUTANEOUS at 14:32

## 2022-01-01 RX ADMIN — FUROSEMIDE 40 MG: 40 TABLET ORAL at 15:14

## 2022-01-01 RX ADMIN — HYDROCORTISONE SODIUM SUCCINATE 50 MG: 100 INJECTION, POWDER, FOR SOLUTION INTRAMUSCULAR; INTRAVENOUS at 02:17

## 2022-01-01 RX ADMIN — AMPICILLIN SODIUM AND SULBACTAM SODIUM 3000 MG: 2; 1 INJECTION, POWDER, FOR SOLUTION INTRAMUSCULAR; INTRAVENOUS at 23:24

## 2022-01-01 RX ADMIN — SODIUM CHLORIDE, PRESERVATIVE FREE 10 ML: 5 INJECTION INTRAVENOUS at 08:34

## 2022-01-01 RX ADMIN — INSULIN GLARGINE 25 UNITS: 100 INJECTION, SOLUTION SUBCUTANEOUS at 20:33

## 2022-01-01 RX ADMIN — GABAPENTIN 100 MG: 100 CAPSULE ORAL at 20:59

## 2022-01-01 RX ADMIN — PANTOPRAZOLE SODIUM 40 MG: 40 TABLET, DELAYED RELEASE ORAL at 07:56

## 2022-01-01 RX ADMIN — TRAZODONE HYDROCHLORIDE 50 MG: 50 TABLET ORAL at 20:11

## 2022-01-01 RX ADMIN — METOPROLOL TARTRATE 25 MG: 25 TABLET, FILM COATED ORAL at 09:01

## 2022-01-01 RX ADMIN — GABAPENTIN 400 MG: 400 CAPSULE ORAL at 15:17

## 2022-01-01 RX ADMIN — HEPARIN SODIUM 5000 UNITS: 5000 INJECTION INTRAVENOUS; SUBCUTANEOUS at 05:02

## 2022-01-01 RX ADMIN — DILTIAZEM HYDROCHLORIDE 15 MG/HR: 5 INJECTION, SOLUTION INTRAVENOUS at 07:45

## 2022-01-01 RX ADMIN — HEPARIN SODIUM 5000 UNITS: 5000 INJECTION INTRAVENOUS; SUBCUTANEOUS at 17:47

## 2022-01-01 RX ADMIN — QUETIAPINE FUMARATE 100 MG: 100 TABLET ORAL at 21:25

## 2022-01-01 RX ADMIN — DILTIAZEM HYDROCHLORIDE 30 MG: 60 TABLET, FILM COATED ORAL at 23:16

## 2022-01-01 RX ADMIN — OXYCODONE HYDROCHLORIDE AND ACETAMINOPHEN 1 TABLET: 5; 325 TABLET ORAL at 12:22

## 2022-01-01 RX ADMIN — Medication 1 TABLET: at 11:41

## 2022-01-01 RX ADMIN — HYDROMORPHONE HYDROCHLORIDE 1 MG: 1 INJECTION, SOLUTION INTRAMUSCULAR; INTRAVENOUS; SUBCUTANEOUS at 00:24

## 2022-01-01 RX ADMIN — PANTOPRAZOLE SODIUM 40 MG: 40 TABLET, DELAYED RELEASE ORAL at 16:51

## 2022-01-01 RX ADMIN — AMPICILLIN SODIUM AND SULBACTAM SODIUM 3000 MG: 2; 1 INJECTION, POWDER, FOR SOLUTION INTRAMUSCULAR; INTRAVENOUS at 18:15

## 2022-01-01 RX ADMIN — FUROSEMIDE 100 MG: 10 INJECTION, SOLUTION INTRAVENOUS at 09:04

## 2022-01-01 RX ADMIN — HYDROMORPHONE HYDROCHLORIDE 1 MG: 1 INJECTION, SOLUTION INTRAMUSCULAR; INTRAVENOUS; SUBCUTANEOUS at 20:26

## 2022-01-01 RX ADMIN — AMPICILLIN SODIUM AND SULBACTAM SODIUM 3000 MG: 2; 1 INJECTION, POWDER, FOR SOLUTION INTRAMUSCULAR; INTRAVENOUS at 17:47

## 2022-01-01 RX ADMIN — OLANZAPINE 5 MG: 5 TABLET, FILM COATED ORAL at 20:13

## 2022-01-01 RX ADMIN — INSULIN GLARGINE 25 UNITS: 100 INJECTION, SOLUTION SUBCUTANEOUS at 09:22

## 2022-01-01 RX ADMIN — Medication: at 20:21

## 2022-01-01 RX ADMIN — INSULIN LISPRO 3 UNITS: 100 INJECTION, SOLUTION INTRAVENOUS; SUBCUTANEOUS at 20:36

## 2022-01-01 RX ADMIN — PHENYLEPHRINE HYDROCHLORIDE 100 MCG: 10 INJECTION INTRAVENOUS at 12:46

## 2022-01-01 RX ADMIN — INSULIN LISPRO 6 UNITS: 100 INJECTION, SOLUTION INTRAVENOUS; SUBCUTANEOUS at 09:13

## 2022-01-01 RX ADMIN — ASCORBIC ACID, THIAMINE MONONITRATE,RIBOFLAVIN, NIACINAMIDE, PYRIDOXINE HYDROCHLORIDE, FOLIC ACID, CYANOCOBALAMIN, BIOTIN, CALCIUM PANTOTHENATE, 1 MG: 100; 1.5; 1.7; 20; 10; 1; 6000; 150000; 5 CAPSULE, LIQUID FILLED ORAL at 07:54

## 2022-01-01 RX ADMIN — HEPARIN SODIUM 5000 UNITS: 5000 INJECTION INTRAVENOUS; SUBCUTANEOUS at 14:05

## 2022-01-01 RX ADMIN — SODIUM CHLORIDE 13.44 UNITS/HR: 9 INJECTION, SOLUTION INTRAVENOUS at 17:58

## 2022-01-01 RX ADMIN — OXYCODONE HYDROCHLORIDE AND ACETAMINOPHEN 1 TABLET: 5; 325 TABLET ORAL at 11:32

## 2022-01-01 RX ADMIN — INSULIN LISPRO 6 UNITS: 100 INJECTION, SOLUTION INTRAVENOUS; SUBCUTANEOUS at 06:09

## 2022-01-01 RX ADMIN — INSULIN LISPRO 3 UNITS: 100 INJECTION, SOLUTION INTRAVENOUS; SUBCUTANEOUS at 16:56

## 2022-01-01 RX ADMIN — CARVEDILOL 6.25 MG: 6.25 TABLET, FILM COATED ORAL at 17:00

## 2022-01-01 RX ADMIN — QUETIAPINE FUMARATE 100 MG: 100 TABLET ORAL at 23:04

## 2022-01-01 RX ADMIN — TAMSULOSIN HYDROCHLORIDE 0.4 MG: 0.4 CAPSULE ORAL at 21:04

## 2022-01-01 RX ADMIN — GABAPENTIN 400 MG: 400 CAPSULE ORAL at 20:20

## 2022-01-01 RX ADMIN — TIZANIDINE 8 MG: 4 TABLET ORAL at 08:54

## 2022-01-01 RX ADMIN — LABETALOL HYDROCHLORIDE 20 MG: 5 INJECTION, SOLUTION INTRAVENOUS at 18:40

## 2022-01-01 RX ADMIN — GABAPENTIN 400 MG: 400 CAPSULE ORAL at 13:44

## 2022-01-01 RX ADMIN — Medication 1 TABLET: at 11:33

## 2022-01-01 RX ADMIN — INSULIN LISPRO 4 UNITS: 100 INJECTION, SOLUTION INTRAVENOUS; SUBCUTANEOUS at 17:29

## 2022-01-01 RX ADMIN — SODIUM CHLORIDE, PRESERVATIVE FREE 10 ML: 5 INJECTION INTRAVENOUS at 21:00

## 2022-01-01 RX ADMIN — PANTOPRAZOLE SODIUM 40 MG: 40 TABLET, DELAYED RELEASE ORAL at 09:49

## 2022-01-01 RX ADMIN — PANTOPRAZOLE SODIUM 40 MG: 40 INJECTION, POWDER, FOR SOLUTION INTRAVENOUS at 08:14

## 2022-01-01 RX ADMIN — OXYCODONE HYDROCHLORIDE AND ACETAMINOPHEN 1 TABLET: 5; 325 TABLET ORAL at 02:51

## 2022-01-01 RX ADMIN — HYDROMORPHONE HYDROCHLORIDE 1 MG: 2 TABLET ORAL at 14:00

## 2022-01-01 RX ADMIN — HEPARIN SODIUM 5000 UNITS: 5000 INJECTION INTRAVENOUS; SUBCUTANEOUS at 20:38

## 2022-01-01 RX ADMIN — OXYCODONE HYDROCHLORIDE AND ACETAMINOPHEN 1 TABLET: 5; 325 TABLET ORAL at 12:11

## 2022-01-01 RX ADMIN — TAMSULOSIN HYDROCHLORIDE 0.4 MG: 0.4 CAPSULE ORAL at 21:29

## 2022-01-01 RX ADMIN — HYDROMORPHONE HYDROCHLORIDE 2 MG: 2 TABLET ORAL at 15:17

## 2022-01-01 RX ADMIN — INSULIN GLARGINE 5 UNITS: 100 INJECTION, SOLUTION SUBCUTANEOUS at 12:58

## 2022-01-01 RX ADMIN — MUPIROCIN: 20 OINTMENT TOPICAL at 20:43

## 2022-01-01 RX ADMIN — SODIUM CHLORIDE, PRESERVATIVE FREE 10 ML: 5 INJECTION INTRAVENOUS at 08:01

## 2022-01-01 RX ADMIN — PAROXETINE HYDROCHLORIDE 20 MG: 20 TABLET, FILM COATED ORAL at 07:55

## 2022-01-01 RX ADMIN — Medication: at 07:57

## 2022-01-01 RX ADMIN — METOCLOPRAMIDE 10 MG: 10 TABLET ORAL at 11:22

## 2022-01-01 RX ADMIN — AMPICILLIN SODIUM AND SULBACTAM SODIUM 3000 MG: 2; 1 INJECTION, POWDER, FOR SOLUTION INTRAMUSCULAR; INTRAVENOUS at 22:50

## 2022-01-01 RX ADMIN — HYDROMORPHONE HYDROCHLORIDE 1 MG: 1 INJECTION, SOLUTION INTRAMUSCULAR; INTRAVENOUS; SUBCUTANEOUS at 09:20

## 2022-01-01 RX ADMIN — DILTIAZEM HYDROCHLORIDE 30 MG: 60 TABLET, FILM COATED ORAL at 17:05

## 2022-01-01 RX ADMIN — METOPROLOL SUCCINATE 25 MG: 25 TABLET, EXTENDED RELEASE ORAL at 20:58

## 2022-01-01 RX ADMIN — Medication 1 TABLET: at 11:22

## 2022-01-01 RX ADMIN — Medication 100 MCG/HR: at 09:55

## 2022-01-01 RX ADMIN — AMPICILLIN SODIUM AND SULBACTAM SODIUM 3000 MG: 2; 1 INJECTION, POWDER, FOR SOLUTION INTRAMUSCULAR; INTRAVENOUS at 22:03

## 2022-01-01 RX ADMIN — ASCORBIC ACID, THIAMINE MONONITRATE,RIBOFLAVIN, NIACINAMIDE, PYRIDOXINE HYDROCHLORIDE, FOLIC ACID, CYANOCOBALAMIN, BIOTIN, CALCIUM PANTOTHENATE, 1 MG: 100; 1.5; 1.7; 20; 10; 1; 6000; 150000; 5 CAPSULE, LIQUID FILLED ORAL at 09:06

## 2022-01-01 RX ADMIN — ALBUMIN (HUMAN) 12.5 G: 0.25 INJECTION, SOLUTION INTRAVENOUS at 13:26

## 2022-01-01 RX ADMIN — PANTOPRAZOLE SODIUM 40 MG: 40 TABLET, DELAYED RELEASE ORAL at 21:03

## 2022-01-01 RX ADMIN — ONDANSETRON HYDROCHLORIDE 4 MG: 2 INJECTION, SOLUTION INTRAMUSCULAR; INTRAVENOUS at 08:14

## 2022-01-01 RX ADMIN — Medication 800 MG: at 20:20

## 2022-01-01 RX ADMIN — INSULIN LISPRO 3 UNITS: 100 INJECTION, SOLUTION INTRAVENOUS; SUBCUTANEOUS at 21:17

## 2022-01-01 RX ADMIN — INSULIN LISPRO 3 UNITS: 100 INJECTION, SOLUTION INTRAVENOUS; SUBCUTANEOUS at 16:22

## 2022-01-01 RX ADMIN — ONDANSETRON 4 MG: 2 INJECTION INTRAMUSCULAR; INTRAVENOUS at 08:48

## 2022-01-01 RX ADMIN — OXYCODONE 5 MG: 5 TABLET ORAL at 15:54

## 2022-01-01 RX ADMIN — DOXYCYCLINE HYCLATE 100 MG: 100 TABLET, COATED ORAL at 10:00

## 2022-01-01 RX ADMIN — METOPROLOL TARTRATE 25 MG: 25 TABLET, FILM COATED ORAL at 21:18

## 2022-01-01 RX ADMIN — DILTIAZEM HYDROCHLORIDE 30 MG: 60 TABLET, FILM COATED ORAL at 06:33

## 2022-01-01 RX ADMIN — SODIUM CHLORIDE, PRESERVATIVE FREE 10 ML: 5 INJECTION INTRAVENOUS at 20:51

## 2022-01-01 RX ADMIN — INSULIN GLARGINE 25 UNITS: 100 INJECTION, SOLUTION SUBCUTANEOUS at 20:40

## 2022-01-01 RX ADMIN — GABAPENTIN 400 MG: 400 CAPSULE ORAL at 09:49

## 2022-01-01 RX ADMIN — INSULIN LISPRO 6 UNITS: 100 INJECTION, SOLUTION INTRAVENOUS; SUBCUTANEOUS at 06:20

## 2022-01-01 RX ADMIN — PANTOPRAZOLE SODIUM 40 MG: 40 TABLET, DELAYED RELEASE ORAL at 06:04

## 2022-01-01 RX ADMIN — Medication 2 MCG/KG/HR: at 02:41

## 2022-01-01 RX ADMIN — GABAPENTIN 400 MG: 400 CAPSULE ORAL at 21:18

## 2022-01-01 RX ADMIN — HYDROMORPHONE HYDROCHLORIDE 1 MG: 1 INJECTION, SOLUTION INTRAMUSCULAR; INTRAVENOUS; SUBCUTANEOUS at 16:30

## 2022-01-01 RX ADMIN — INSULIN GLARGINE 25 UNITS: 100 INJECTION, SOLUTION SUBCUTANEOUS at 20:18

## 2022-01-01 RX ADMIN — LINEZOLID 600 MG: 600 INJECTION, SOLUTION INTRAVENOUS at 16:58

## 2022-01-01 RX ADMIN — Medication 2000 MG: at 14:08

## 2022-01-01 RX ADMIN — INSULIN LISPRO 3 UNITS: 100 INJECTION, SOLUTION INTRAVENOUS; SUBCUTANEOUS at 06:19

## 2022-01-01 RX ADMIN — INSULIN LISPRO 6 UNITS: 100 INJECTION, SOLUTION INTRAVENOUS; SUBCUTANEOUS at 06:15

## 2022-01-01 RX ADMIN — DOXYCYCLINE HYCLATE 100 MG: 100 TABLET, COATED ORAL at 20:04

## 2022-01-01 RX ADMIN — SUCRALFATE 1 G: 1 TABLET ORAL at 20:12

## 2022-01-01 RX ADMIN — Medication 100 MCG/HR: at 04:46

## 2022-01-01 RX ADMIN — INSULIN GLARGINE 15 UNITS: 100 INJECTION, SOLUTION SUBCUTANEOUS at 10:00

## 2022-01-01 RX ADMIN — METOPROLOL TARTRATE 25 MG: 25 TABLET, FILM COATED ORAL at 21:08

## 2022-01-01 RX ADMIN — INSULIN LISPRO 3 UNITS: 100 INJECTION, SOLUTION INTRAVENOUS; SUBCUTANEOUS at 12:54

## 2022-01-01 RX ADMIN — ASCORBIC ACID, THIAMINE MONONITRATE,RIBOFLAVIN, NIACINAMIDE, PYRIDOXINE HYDROCHLORIDE, FOLIC ACID, CYANOCOBALAMIN, BIOTIN, CALCIUM PANTOTHENATE, 1 MG: 100; 1.5; 1.7; 20; 10; 1; 6000; 150000; 5 CAPSULE, LIQUID FILLED ORAL at 13:25

## 2022-01-01 RX ADMIN — OXYCODONE 10 MG: 5 TABLET ORAL at 06:31

## 2022-01-01 RX ADMIN — METOCLOPRAMIDE 10 MG: 10 TABLET ORAL at 06:19

## 2022-01-01 RX ADMIN — PANTOPRAZOLE SODIUM 40 MG: 40 TABLET, DELAYED RELEASE ORAL at 07:59

## 2022-01-01 RX ADMIN — TAMSULOSIN HYDROCHLORIDE 0.4 MG: 0.4 CAPSULE ORAL at 20:18

## 2022-01-01 RX ADMIN — INSULIN LISPRO 3 UNITS: 100 INJECTION, SOLUTION INTRAVENOUS; SUBCUTANEOUS at 06:35

## 2022-01-01 RX ADMIN — METOCLOPRAMIDE 10 MG: 10 TABLET ORAL at 11:49

## 2022-01-01 RX ADMIN — HEPARIN SODIUM 5000 UNITS: 5000 INJECTION INTRAVENOUS; SUBCUTANEOUS at 13:40

## 2022-01-01 RX ADMIN — PANTOPRAZOLE SODIUM 40 MG: 40 TABLET, DELAYED RELEASE ORAL at 09:44

## 2022-01-01 RX ADMIN — FUROSEMIDE 40 MG: 10 INJECTION, SOLUTION INTRAMUSCULAR; INTRAVENOUS at 11:52

## 2022-01-01 RX ADMIN — NAFCILLIN SODIUM 2000 MG: 2 INJECTION, POWDER, LYOPHILIZED, FOR SOLUTION INTRAMUSCULAR; INTRAVENOUS at 22:38

## 2022-01-01 RX ADMIN — INSULIN LISPRO 3 UNITS: 100 INJECTION, SOLUTION INTRAVENOUS; SUBCUTANEOUS at 03:44

## 2022-01-01 RX ADMIN — CHLORHEXIDINE GLUCONATE 0.12% ORAL RINSE 15 ML: 1.2 LIQUID ORAL at 21:45

## 2022-01-01 RX ADMIN — NAFCILLIN SODIUM 2000 MG: 2 INJECTION, POWDER, LYOPHILIZED, FOR SOLUTION INTRAMUSCULAR; INTRAVENOUS at 14:47

## 2022-01-01 RX ADMIN — OXYCODONE 10 MG: 5 TABLET ORAL at 20:50

## 2022-01-01 RX ADMIN — SUCRALFATE 1 G: 1 TABLET ORAL at 18:22

## 2022-01-01 RX ADMIN — HYDROMORPHONE HYDROCHLORIDE 2 MG: 2 TABLET ORAL at 10:00

## 2022-01-01 RX ADMIN — HYDROMORPHONE HYDROCHLORIDE 2 MG: 2 TABLET ORAL at 14:52

## 2022-01-01 RX ADMIN — METOPROLOL SUCCINATE 25 MG: 25 TABLET, EXTENDED RELEASE ORAL at 20:48

## 2022-01-01 RX ADMIN — INSULIN GLARGINE 25 UNITS: 100 INJECTION, SOLUTION SUBCUTANEOUS at 21:23

## 2022-01-01 RX ADMIN — MIDAZOLAM HYDROCHLORIDE 2 MG: 2 INJECTION, SOLUTION INTRAMUSCULAR; INTRAVENOUS at 00:53

## 2022-01-01 RX ADMIN — Medication 0.7 MCG/KG/HR: at 08:41

## 2022-01-01 RX ADMIN — METOCLOPRAMIDE 10 MG: 10 TABLET ORAL at 06:27

## 2022-01-01 RX ADMIN — OXYCODONE 10 MG: 5 TABLET ORAL at 13:39

## 2022-01-01 RX ADMIN — SODIUM CHLORIDE, PRESERVATIVE FREE 10 ML: 5 INJECTION INTRAVENOUS at 11:00

## 2022-01-01 RX ADMIN — Medication: at 08:08

## 2022-01-01 RX ADMIN — TIZANIDINE 8 MG: 4 TABLET ORAL at 20:52

## 2022-01-01 RX ADMIN — INSULIN GLARGINE 25 UNITS: 100 INJECTION, SOLUTION SUBCUTANEOUS at 20:56

## 2022-01-01 RX ADMIN — TAMSULOSIN HYDROCHLORIDE 0.4 MG: 0.4 CAPSULE ORAL at 20:06

## 2022-01-01 RX ADMIN — INSULIN LISPRO 6 UNITS: 100 INJECTION, SOLUTION INTRAVENOUS; SUBCUTANEOUS at 08:41

## 2022-01-01 RX ADMIN — HEPARIN SODIUM: 1000 INJECTION INTRAVENOUS; SUBCUTANEOUS at 22:00

## 2022-01-01 RX ADMIN — HYDROMORPHONE HYDROCHLORIDE 1 MG: 1 INJECTION, SOLUTION INTRAMUSCULAR; INTRAVENOUS; SUBCUTANEOUS at 18:09

## 2022-01-01 RX ADMIN — DILTIAZEM HYDROCHLORIDE 30 MG: 60 TABLET, FILM COATED ORAL at 10:27

## 2022-01-01 RX ADMIN — SODIUM CHLORIDE, PRESERVATIVE FREE 10 ML: 5 INJECTION INTRAVENOUS at 21:11

## 2022-01-01 RX ADMIN — HYDROMORPHONE HYDROCHLORIDE 2 MG: 2 TABLET ORAL at 08:42

## 2022-01-01 RX ADMIN — METOCLOPRAMIDE 10 MG: 10 TABLET ORAL at 12:39

## 2022-01-01 RX ADMIN — Medication: at 11:52

## 2022-01-01 RX ADMIN — OXYCODONE 10 MG: 5 TABLET ORAL at 09:50

## 2022-01-01 RX ADMIN — Medication: at 14:14

## 2022-01-01 RX ADMIN — OXYCODONE 10 MG: 5 TABLET ORAL at 01:10

## 2022-01-01 RX ADMIN — INSULIN LISPRO 6 UNITS: 100 INJECTION, SOLUTION INTRAVENOUS; SUBCUTANEOUS at 20:33

## 2022-01-01 RX ADMIN — Medication 2 MCG/KG/HR: at 03:12

## 2022-01-01 RX ADMIN — AMPICILLIN SODIUM AND SULBACTAM SODIUM 3000 MG: 2; 1 INJECTION, POWDER, FOR SOLUTION INTRAMUSCULAR; INTRAVENOUS at 10:54

## 2022-01-01 RX ADMIN — MUPIROCIN: 20 OINTMENT TOPICAL at 08:02

## 2022-01-01 RX ADMIN — OXYCODONE 10 MG: 5 TABLET ORAL at 17:54

## 2022-01-01 RX ADMIN — DILTIAZEM HYDROCHLORIDE 30 MG: 60 TABLET, FILM COATED ORAL at 04:56

## 2022-01-01 RX ADMIN — POTASSIUM BICARBONATE 40 MEQ: 391 TABLET, EFFERVESCENT ORAL at 11:10

## 2022-01-01 RX ADMIN — METOPROLOL SUCCINATE 50 MG: 50 TABLET, EXTENDED RELEASE ORAL at 08:09

## 2022-01-01 RX ADMIN — TIZANIDINE 8 MG: 4 TABLET ORAL at 09:59

## 2022-01-01 RX ADMIN — HYDROMORPHONE HYDROCHLORIDE 1 MG: 1 INJECTION, SOLUTION INTRAMUSCULAR; INTRAVENOUS; SUBCUTANEOUS at 00:54

## 2022-01-01 RX ADMIN — OXYCODONE HYDROCHLORIDE AND ACETAMINOPHEN 1 TABLET: 5; 325 TABLET ORAL at 15:20

## 2022-01-01 RX ADMIN — METOPROLOL TARTRATE 25 MG: 25 TABLET, FILM COATED ORAL at 09:11

## 2022-01-01 RX ADMIN — PROPOFOL INJECTABLE EMULSION 50 MCG/KG/MIN: 10 INJECTION, EMULSION INTRAVENOUS at 05:59

## 2022-01-01 RX ADMIN — OXYCODONE 10 MG: 5 TABLET ORAL at 15:03

## 2022-01-01 RX ADMIN — SODIUM CHLORIDE: 9 INJECTION, SOLUTION INTRAVENOUS at 14:58

## 2022-01-01 RX ADMIN — HEPARIN SODIUM 5000 UNITS: 5000 INJECTION INTRAVENOUS; SUBCUTANEOUS at 21:58

## 2022-01-01 RX ADMIN — PROPOFOL INJECTABLE EMULSION 50 MCG/KG/MIN: 10 INJECTION, EMULSION INTRAVENOUS at 18:20

## 2022-01-01 RX ADMIN — INSULIN LISPRO 3 UNITS: 100 INJECTION, SOLUTION INTRAVENOUS; SUBCUTANEOUS at 14:00

## 2022-01-01 RX ADMIN — Medication: at 07:49

## 2022-01-01 RX ADMIN — INSULIN LISPRO 4 UNITS: 100 INJECTION, SOLUTION INTRAVENOUS; SUBCUTANEOUS at 16:55

## 2022-01-01 RX ADMIN — DOXYCYCLINE HYCLATE 100 MG: 100 TABLET, COATED ORAL at 21:18

## 2022-01-01 RX ADMIN — Medication 800 MG: at 14:54

## 2022-01-01 RX ADMIN — TIZANIDINE 8 MG: 4 TABLET ORAL at 13:15

## 2022-01-01 RX ADMIN — FENTANYL CITRATE 25 MCG: 50 INJECTION INTRAMUSCULAR; INTRAVENOUS at 11:54

## 2022-01-01 RX ADMIN — PAROXETINE HYDROCHLORIDE 20 MG: 20 TABLET, FILM COATED ORAL at 08:13

## 2022-01-01 RX ADMIN — SODIUM CHLORIDE, PRESERVATIVE FREE 10 ML: 5 INJECTION INTRAVENOUS at 08:36

## 2022-01-01 RX ADMIN — SENNOSIDES 17.2 MG: 8.6 TABLET, COATED ORAL at 08:59

## 2022-01-01 RX ADMIN — QUETIAPINE FUMARATE 25 MG: 25 TABLET ORAL at 16:59

## 2022-01-01 RX ADMIN — SUCRALFATE 1 G: 1 TABLET ORAL at 11:55

## 2022-01-01 RX ADMIN — INSULIN LISPRO 3 UNITS: 100 INJECTION, SOLUTION INTRAVENOUS; SUBCUTANEOUS at 16:55

## 2022-01-01 RX ADMIN — OXYCODONE HYDROCHLORIDE AND ACETAMINOPHEN 1 TABLET: 5; 325 TABLET ORAL at 00:09

## 2022-01-01 RX ADMIN — MIDAZOLAM HYDROCHLORIDE 2 MG: 2 INJECTION, SOLUTION INTRAMUSCULAR; INTRAVENOUS at 15:20

## 2022-01-01 RX ADMIN — TAMSULOSIN HYDROCHLORIDE 0.4 MG: 0.4 CAPSULE ORAL at 20:58

## 2022-01-01 RX ADMIN — HEPARIN SODIUM 5000 UNITS: 5000 INJECTION INTRAVENOUS; SUBCUTANEOUS at 14:00

## 2022-01-01 RX ADMIN — HEPARIN SODIUM 5000 UNITS: 5000 INJECTION INTRAVENOUS; SUBCUTANEOUS at 06:14

## 2022-01-01 RX ADMIN — Medication: at 21:02

## 2022-01-01 RX ADMIN — SODIUM CHLORIDE, PRESERVATIVE FREE 10 ML: 5 INJECTION INTRAVENOUS at 08:20

## 2022-01-01 RX ADMIN — METOPROLOL TARTRATE 25 MG: 25 TABLET, FILM COATED ORAL at 19:38

## 2022-01-01 RX ADMIN — ACETAMINOPHEN 650 MG: 325 TABLET ORAL at 17:11

## 2022-01-01 RX ADMIN — EPOETIN ALFA-EPBX 10000 UNITS: 10000 INJECTION, SOLUTION INTRAVENOUS; SUBCUTANEOUS at 10:37

## 2022-01-01 RX ADMIN — SODIUM BICARBONATE 50 MEQ: 84 INJECTION INTRAVENOUS at 20:44

## 2022-01-01 RX ADMIN — SODIUM CHLORIDE, PRESERVATIVE FREE 10 ML: 5 INJECTION INTRAVENOUS at 11:51

## 2022-01-01 RX ADMIN — HEPARIN SODIUM 5000 UNITS: 5000 INJECTION INTRAVENOUS; SUBCUTANEOUS at 21:12

## 2022-01-01 RX ADMIN — Medication 400 MG: at 20:42

## 2022-01-01 RX ADMIN — HEPARIN SODIUM 5000 UNITS: 5000 INJECTION INTRAVENOUS; SUBCUTANEOUS at 22:37

## 2022-01-01 RX ADMIN — DILTIAZEM HYDROCHLORIDE 30 MG: 60 TABLET, FILM COATED ORAL at 12:10

## 2022-01-01 RX ADMIN — INSULIN GLARGINE 15 UNITS: 100 INJECTION, SOLUTION SUBCUTANEOUS at 20:40

## 2022-01-01 RX ADMIN — Medication 400 MG: at 20:58

## 2022-01-01 RX ADMIN — AMPICILLIN SODIUM AND SULBACTAM SODIUM 3000 MG: 2; 1 INJECTION, POWDER, FOR SOLUTION INTRAMUSCULAR; INTRAVENOUS at 05:48

## 2022-01-01 RX ADMIN — METOPROLOL TARTRATE 5 MG: 5 INJECTION INTRAVENOUS at 17:21

## 2022-01-01 RX ADMIN — TIZANIDINE 4 MG: 4 TABLET ORAL at 21:02

## 2022-01-01 RX ADMIN — INSULIN LISPRO 3 UNITS: 100 INJECTION, SOLUTION INTRAVENOUS; SUBCUTANEOUS at 18:30

## 2022-01-01 RX ADMIN — Medication 800 MG: at 20:08

## 2022-01-01 RX ADMIN — NAFCILLIN SODIUM 2000 MG: 2 INJECTION, POWDER, LYOPHILIZED, FOR SOLUTION INTRAMUSCULAR; INTRAVENOUS at 04:13

## 2022-01-01 RX ADMIN — GABAPENTIN 400 MG: 400 CAPSULE ORAL at 09:12

## 2022-01-01 RX ADMIN — PAROXETINE HYDROCHLORIDE 20 MG: 20 TABLET, FILM COATED ORAL at 09:52

## 2022-01-01 RX ADMIN — QUETIAPINE FUMARATE 25 MG: 25 TABLET ORAL at 17:55

## 2022-01-01 RX ADMIN — CALCIUM GLUCONATE 1000 MG: 98 INJECTION, SOLUTION INTRAVENOUS at 22:05

## 2022-01-01 RX ADMIN — HEPARIN SODIUM 5000 UNITS: 5000 INJECTION INTRAVENOUS; SUBCUTANEOUS at 16:19

## 2022-01-01 RX ADMIN — Medication 1 MCG/KG/HR: at 09:40

## 2022-01-01 RX ADMIN — NOREPINEPHRINE BITARTRATE 30 MCG/MIN: 1 INJECTION, SOLUTION, CONCENTRATE INTRAVENOUS at 22:45

## 2022-01-01 RX ADMIN — TIZANIDINE 4 MG: 4 TABLET ORAL at 13:54

## 2022-01-01 RX ADMIN — HYDROCORTISONE SODIUM SUCCINATE 50 MG: 100 INJECTION, POWDER, FOR SOLUTION INTRAMUSCULAR; INTRAVENOUS at 20:34

## 2022-01-01 RX ADMIN — OXYCODONE 10 MG: 5 TABLET ORAL at 16:41

## 2022-01-01 RX ADMIN — METOCLOPRAMIDE 10 MG: 10 TABLET ORAL at 11:16

## 2022-01-01 RX ADMIN — HYDROMORPHONE HYDROCHLORIDE 1 MG: 2 TABLET ORAL at 18:18

## 2022-01-01 RX ADMIN — HEPARIN SODIUM 5000 UNITS: 5000 INJECTION INTRAVENOUS; SUBCUTANEOUS at 06:25

## 2022-01-01 RX ADMIN — TIZANIDINE 4 MG: 4 TABLET ORAL at 09:40

## 2022-01-01 RX ADMIN — METOPROLOL TARTRATE 25 MG: 25 TABLET, FILM COATED ORAL at 09:40

## 2022-01-01 RX ADMIN — METOPROLOL TARTRATE 25 MG: 25 TABLET, FILM COATED ORAL at 07:56

## 2022-01-01 RX ADMIN — TIZANIDINE 8 MG: 4 TABLET ORAL at 21:08

## 2022-01-01 RX ADMIN — NAFCILLIN SODIUM 2000 MG: 2 INJECTION, POWDER, LYOPHILIZED, FOR SOLUTION INTRAMUSCULAR; INTRAVENOUS at 16:00

## 2022-01-01 RX ADMIN — INSULIN LISPRO 3 UNITS: 100 INJECTION, SOLUTION INTRAVENOUS; SUBCUTANEOUS at 04:05

## 2022-01-01 RX ADMIN — Medication 2 MCG/KG/HR: at 13:05

## 2022-01-01 RX ADMIN — INSULIN LISPRO 5 UNITS: 100 INJECTION, SOLUTION INTRAVENOUS; SUBCUTANEOUS at 20:53

## 2022-01-01 RX ADMIN — INSULIN GLARGINE 50 UNITS: 100 INJECTION, SOLUTION SUBCUTANEOUS at 20:22

## 2022-01-01 RX ADMIN — TIZANIDINE 8 MG: 4 TABLET ORAL at 09:05

## 2022-01-01 RX ADMIN — NIFEDIPINE 30 MG: 30 TABLET, FILM COATED, EXTENDED RELEASE ORAL at 07:49

## 2022-01-01 RX ADMIN — INSULIN GLARGINE 10 UNITS: 100 INJECTION, SOLUTION SUBCUTANEOUS at 21:19

## 2022-01-01 RX ADMIN — Medication: at 05:00

## 2022-01-01 RX ADMIN — Medication: at 18:26

## 2022-01-01 RX ADMIN — PANTOPRAZOLE SODIUM 40 MG: 40 TABLET, DELAYED RELEASE ORAL at 08:28

## 2022-01-01 RX ADMIN — SODIUM BICARBONATE 50 MEQ: 84 INJECTION, SOLUTION INTRAVENOUS at 02:56

## 2022-01-01 RX ADMIN — OXYCODONE 10 MG: 5 TABLET ORAL at 06:26

## 2022-01-01 RX ADMIN — HEPARIN SODIUM 5000 UNITS: 5000 INJECTION INTRAVENOUS; SUBCUTANEOUS at 15:16

## 2022-01-01 RX ADMIN — HYDROMORPHONE HYDROCHLORIDE 2 MG: 2 TABLET ORAL at 16:19

## 2022-01-01 RX ADMIN — Medication 1 TABLET: at 12:01

## 2022-01-01 RX ADMIN — INSULIN LISPRO 6 UNITS: 100 INJECTION, SOLUTION INTRAVENOUS; SUBCUTANEOUS at 00:24

## 2022-01-01 RX ADMIN — HYDROMORPHONE HYDROCHLORIDE 1 MG: 1 INJECTION, SOLUTION INTRAMUSCULAR; INTRAVENOUS; SUBCUTANEOUS at 04:32

## 2022-01-01 RX ADMIN — LABETALOL HYDROCHLORIDE 10 MG: 5 INJECTION, SOLUTION INTRAVENOUS at 14:25

## 2022-01-01 RX ADMIN — TRAZODONE HYDROCHLORIDE 50 MG: 50 TABLET ORAL at 20:13

## 2022-01-01 RX ADMIN — METOCLOPRAMIDE 10 MG: 10 TABLET ORAL at 17:09

## 2022-01-01 RX ADMIN — Medication 800 MG: at 07:59

## 2022-01-01 RX ADMIN — ACETAMINOPHEN 650 MG: 325 TABLET ORAL at 04:31

## 2022-01-01 RX ADMIN — NAFCILLIN SODIUM 2000 MG: 2 INJECTION, POWDER, LYOPHILIZED, FOR SOLUTION INTRAMUSCULAR; INTRAVENOUS at 11:37

## 2022-01-01 RX ADMIN — PANTOPRAZOLE SODIUM 40 MG: 40 TABLET, DELAYED RELEASE ORAL at 20:01

## 2022-01-01 RX ADMIN — AMPICILLIN SODIUM AND SULBACTAM SODIUM 3000 MG: 2; 1 INJECTION, POWDER, FOR SOLUTION INTRAMUSCULAR; INTRAVENOUS at 05:11

## 2022-01-01 RX ADMIN — HYDROMORPHONE HYDROCHLORIDE 1 MG: 1 INJECTION, SOLUTION INTRAMUSCULAR; INTRAVENOUS; SUBCUTANEOUS at 19:03

## 2022-01-01 RX ADMIN — OXYCODONE HYDROCHLORIDE AND ACETAMINOPHEN 1 TABLET: 5; 325 TABLET ORAL at 05:05

## 2022-01-01 RX ADMIN — ASCORBIC ACID, THIAMINE MONONITRATE,RIBOFLAVIN, NIACINAMIDE, PYRIDOXINE HYDROCHLORIDE, FOLIC ACID, CYANOCOBALAMIN, BIOTIN, CALCIUM PANTOTHENATE, 1 MG: 100; 1.5; 1.7; 20; 10; 1; 6000; 150000; 5 CAPSULE, LIQUID FILLED ORAL at 08:39

## 2022-01-01 RX ADMIN — METOPROLOL TARTRATE 5 MG: 5 INJECTION INTRAVENOUS at 04:18

## 2022-01-01 RX ADMIN — Medication: at 07:51

## 2022-01-01 RX ADMIN — Medication: at 12:30

## 2022-01-01 RX ADMIN — HYDRALAZINE HYDROCHLORIDE 20 MG: 20 INJECTION INTRAMUSCULAR; INTRAVENOUS at 21:46

## 2022-01-01 RX ADMIN — PROMETHAZINE HYDROCHLORIDE 12.5 MG: 25 TABLET ORAL at 11:37

## 2022-01-01 RX ADMIN — GABAPENTIN 400 MG: 400 CAPSULE ORAL at 08:44

## 2022-01-01 RX ADMIN — METOCLOPRAMIDE 10 MG: 10 TABLET ORAL at 20:11

## 2022-01-01 RX ADMIN — FENTANYL CITRATE 50 MCG: 50 INJECTION INTRAMUSCULAR; INTRAVENOUS at 18:28

## 2022-01-01 RX ADMIN — OXYCODONE 10 MG: 5 TABLET ORAL at 03:40

## 2022-01-01 RX ADMIN — Medication 2 MCG/KG/HR: at 04:14

## 2022-01-01 RX ADMIN — Medication: at 08:33

## 2022-01-01 RX ADMIN — TRAZODONE HYDROCHLORIDE 50 MG: 50 TABLET ORAL at 20:12

## 2022-01-01 RX ADMIN — HYDROCORTISONE SODIUM SUCCINATE 50 MG: 100 INJECTION, POWDER, FOR SOLUTION INTRAMUSCULAR; INTRAVENOUS at 20:07

## 2022-01-01 RX ADMIN — OXYCODONE 5 MG: 5 TABLET ORAL at 09:40

## 2022-01-01 RX ADMIN — OXYCODONE HYDROCHLORIDE AND ACETAMINOPHEN 1 TABLET: 5; 325 TABLET ORAL at 10:58

## 2022-01-01 RX ADMIN — GABAPENTIN 400 MG: 400 CAPSULE ORAL at 13:34

## 2022-01-01 RX ADMIN — GABAPENTIN 400 MG: 400 CAPSULE ORAL at 09:01

## 2022-01-01 RX ADMIN — SUCRALFATE 1 G: 1 TABLET ORAL at 05:47

## 2022-01-01 RX ADMIN — HEPARIN SODIUM 5000 UNITS: 5000 INJECTION INTRAVENOUS; SUBCUTANEOUS at 04:30

## 2022-01-01 RX ADMIN — Medication 2000 MG: at 20:23

## 2022-01-01 RX ADMIN — SUCRALFATE 1 G: 1 TABLET ORAL at 12:13

## 2022-01-01 RX ADMIN — METOCLOPRAMIDE 10 MG: 10 TABLET ORAL at 21:30

## 2022-01-01 RX ADMIN — Medication 0.6 MCG/KG/HR: at 16:42

## 2022-01-01 RX ADMIN — Medication 100 MCG/HR: at 18:31

## 2022-01-01 RX ADMIN — OXYCODONE 10 MG: 5 TABLET ORAL at 01:58

## 2022-01-01 RX ADMIN — HYDROCORTISONE SODIUM SUCCINATE 50 MG: 100 INJECTION, POWDER, FOR SOLUTION INTRAMUSCULAR; INTRAVENOUS at 20:58

## 2022-01-01 RX ADMIN — SODIUM CHLORIDE, PRESERVATIVE FREE 10 ML: 5 INJECTION INTRAVENOUS at 10:28

## 2022-01-01 RX ADMIN — Medication 1.2 MCG/KG/HR: at 12:28

## 2022-01-01 RX ADMIN — OXYCODONE 10 MG: 5 TABLET ORAL at 12:45

## 2022-01-01 RX ADMIN — PANTOPRAZOLE SODIUM 40 MG: 40 TABLET, DELAYED RELEASE ORAL at 08:46

## 2022-01-01 RX ADMIN — SODIUM CHLORIDE, PRESERVATIVE FREE 10 ML: 5 INJECTION INTRAVENOUS at 09:11

## 2022-01-01 RX ADMIN — SODIUM PHOSPHATE 1 ENEMA: 7; 19 ENEMA RECTAL at 16:44

## 2022-01-01 RX ADMIN — Medication 400 MG: at 09:26

## 2022-01-01 RX ADMIN — TIZANIDINE 8 MG: 4 TABLET ORAL at 09:51

## 2022-01-01 RX ADMIN — Medication 400 MG: at 07:55

## 2022-01-01 RX ADMIN — SODIUM CHLORIDE, PRESERVATIVE FREE 10 ML: 5 INJECTION INTRAVENOUS at 20:20

## 2022-01-01 RX ADMIN — Medication 125 MCG/HR: at 20:00

## 2022-01-01 RX ADMIN — INSULIN GLARGINE 15 UNITS: 100 INJECTION, SOLUTION SUBCUTANEOUS at 10:32

## 2022-01-01 RX ADMIN — SODIUM CHLORIDE 8 MG/HR: 9 INJECTION, SOLUTION INTRAVENOUS at 13:10

## 2022-01-01 RX ADMIN — Medication 1.4 MCG/KG/HR: at 06:04

## 2022-01-01 RX ADMIN — FUROSEMIDE 40 MG: 40 TABLET ORAL at 17:25

## 2022-01-01 RX ADMIN — OXYCODONE 10 MG: 5 TABLET ORAL at 02:57

## 2022-01-01 RX ADMIN — PAROXETINE HYDROCHLORIDE 20 MG: 20 TABLET, FILM COATED ORAL at 08:46

## 2022-01-01 RX ADMIN — INSULIN LISPRO 3 UNITS: 100 INJECTION, SOLUTION INTRAVENOUS; SUBCUTANEOUS at 06:40

## 2022-01-01 RX ADMIN — Medication: at 09:41

## 2022-01-01 RX ADMIN — FLUTICASONE PROPIONATE 2 SPRAY: 50 SPRAY, METERED NASAL at 21:01

## 2022-01-01 RX ADMIN — Medication 800 MG: at 14:36

## 2022-01-01 RX ADMIN — FENTANYL CITRATE 50 MCG: 50 INJECTION INTRAMUSCULAR; INTRAVENOUS at 18:11

## 2022-01-01 RX ADMIN — INSULIN LISPRO 3 UNITS: 100 INJECTION, SOLUTION INTRAVENOUS; SUBCUTANEOUS at 06:03

## 2022-01-01 RX ADMIN — SODIUM CHLORIDE, PRESERVATIVE FREE 10 ML: 5 INJECTION INTRAVENOUS at 08:05

## 2022-01-01 RX ADMIN — Medication 400 MG: at 09:06

## 2022-01-01 RX ADMIN — OXYCODONE 7.5 MG: 15 TABLET ORAL at 05:42

## 2022-01-01 RX ADMIN — Medication: at 09:50

## 2022-01-01 RX ADMIN — HEPARIN SODIUM 2600 UNITS: 1000 INJECTION INTRAVENOUS; SUBCUTANEOUS at 15:21

## 2022-01-01 RX ADMIN — OXYCODONE 10 MG: 5 TABLET ORAL at 08:08

## 2022-01-01 RX ADMIN — PANTOPRAZOLE SODIUM 40 MG: 40 TABLET, DELAYED RELEASE ORAL at 21:18

## 2022-01-01 RX ADMIN — Medication 800 MG: at 09:12

## 2022-01-01 RX ADMIN — HEPARIN SODIUM 5000 UNITS: 5000 INJECTION INTRAVENOUS; SUBCUTANEOUS at 06:18

## 2022-01-01 RX ADMIN — MIDAZOLAM HYDROCHLORIDE 2 MG: 2 INJECTION, SOLUTION INTRAMUSCULAR; INTRAVENOUS at 16:02

## 2022-01-01 RX ADMIN — METOCLOPRAMIDE 10 MG: 10 TABLET ORAL at 17:01

## 2022-01-01 RX ADMIN — OXYCODONE 10 MG: 5 TABLET ORAL at 16:46

## 2022-01-01 RX ADMIN — METOPROLOL TARTRATE 5 MG: 5 INJECTION INTRAVENOUS at 10:39

## 2022-01-01 RX ADMIN — TIZANIDINE 8 MG: 4 TABLET ORAL at 08:58

## 2022-01-01 RX ADMIN — SODIUM CHLORIDE 25 ML: 9 INJECTION, SOLUTION INTRAVENOUS at 12:00

## 2022-01-01 RX ADMIN — FENTANYL CITRATE 50 MCG: 50 INJECTION INTRAMUSCULAR; INTRAVENOUS at 02:49

## 2022-01-01 RX ADMIN — CARVEDILOL 12.5 MG: 6.25 TABLET, FILM COATED ORAL at 10:51

## 2022-01-01 RX ADMIN — SODIUM CHLORIDE, PRESERVATIVE FREE 10 ML: 5 INJECTION INTRAVENOUS at 13:17

## 2022-01-01 RX ADMIN — CHLORHEXIDINE GLUCONATE 0.12% ORAL RINSE 15 ML: 1.2 LIQUID ORAL at 09:07

## 2022-01-01 RX ADMIN — QUETIAPINE FUMARATE 12.5 MG: 25 TABLET ORAL at 16:05

## 2022-01-01 RX ADMIN — NAFCILLIN SODIUM 2000 MG: 2 INJECTION, POWDER, LYOPHILIZED, FOR SOLUTION INTRAMUSCULAR; INTRAVENOUS at 22:44

## 2022-01-01 RX ADMIN — HEPARIN SODIUM 5000 UNITS: 5000 INJECTION INTRAVENOUS; SUBCUTANEOUS at 06:02

## 2022-01-01 RX ADMIN — PROMETHAZINE HYDROCHLORIDE 12.5 MG: 25 TABLET ORAL at 08:37

## 2022-01-01 RX ADMIN — INSULIN LISPRO 3 UNITS: 100 INJECTION, SOLUTION INTRAVENOUS; SUBCUTANEOUS at 11:16

## 2022-01-01 RX ADMIN — ONDANSETRON HYDROCHLORIDE 4 MG: 2 INJECTION, SOLUTION INTRAMUSCULAR; INTRAVENOUS at 13:06

## 2022-01-01 RX ADMIN — Medication 0.8 MCG/KG/HR: at 18:09

## 2022-01-01 RX ADMIN — Medication 1 TABLET: at 10:51

## 2022-01-01 RX ADMIN — SODIUM CHLORIDE 3.7 UNITS/HR: 9 INJECTION, SOLUTION INTRAVENOUS at 07:59

## 2022-01-01 RX ADMIN — GABAPENTIN 300 MG: 300 CAPSULE ORAL at 20:12

## 2022-01-01 RX ADMIN — SODIUM CHLORIDE, PRESERVATIVE FREE 10 ML: 5 INJECTION INTRAVENOUS at 20:18

## 2022-01-01 RX ADMIN — SODIUM CHLORIDE: 9 INJECTION, SOLUTION INTRAVENOUS at 11:32

## 2022-01-01 RX ADMIN — SUCRALFATE 1 G: 1 TABLET ORAL at 17:10

## 2022-01-01 RX ADMIN — HEPARIN SODIUM 5000 UNITS: 5000 INJECTION INTRAVENOUS; SUBCUTANEOUS at 06:10

## 2022-01-01 RX ADMIN — PANTOPRAZOLE SODIUM 40 MG: 40 INJECTION, POWDER, FOR SOLUTION INTRAVENOUS at 20:52

## 2022-01-01 RX ADMIN — HYDROMORPHONE HYDROCHLORIDE 1 MG: 1 INJECTION, SOLUTION INTRAMUSCULAR; INTRAVENOUS; SUBCUTANEOUS at 02:13

## 2022-01-01 RX ADMIN — QUETIAPINE FUMARATE 25 MG: 25 TABLET ORAL at 08:26

## 2022-01-01 RX ADMIN — Medication 2000 MG: at 11:57

## 2022-01-01 RX ADMIN — FENTANYL CITRATE 50 MCG: 50 INJECTION INTRAMUSCULAR; INTRAVENOUS at 01:23

## 2022-01-01 RX ADMIN — OXYCODONE 10 MG: 5 TABLET ORAL at 05:22

## 2022-01-01 RX ADMIN — PROPOFOL INJECTABLE EMULSION 20 MCG/KG/MIN: 10 INJECTION, EMULSION INTRAVENOUS at 11:41

## 2022-01-01 RX ADMIN — Medication 400 MG: at 20:06

## 2022-01-01 RX ADMIN — CHLORHEXIDINE GLUCONATE 0.12% ORAL RINSE 15 ML: 1.2 LIQUID ORAL at 08:11

## 2022-01-01 RX ADMIN — Medication: at 22:00

## 2022-01-01 RX ADMIN — SODIUM CHLORIDE, PRESERVATIVE FREE 10 ML: 5 INJECTION INTRAVENOUS at 09:08

## 2022-01-01 RX ADMIN — METOCLOPRAMIDE 10 MG: 10 TABLET ORAL at 05:39

## 2022-01-01 RX ADMIN — IOPAMIDOL 100 ML: 755 INJECTION, SOLUTION INTRAVENOUS at 14:32

## 2022-01-01 RX ADMIN — GABAPENTIN 200 MG: 100 CAPSULE ORAL at 21:09

## 2022-01-01 RX ADMIN — INSULIN LISPRO 6 UNITS: 100 INJECTION, SOLUTION INTRAVENOUS; SUBCUTANEOUS at 11:22

## 2022-01-01 RX ADMIN — INSULIN LISPRO 3 UNITS: 100 INJECTION, SOLUTION INTRAVENOUS; SUBCUTANEOUS at 09:19

## 2022-01-01 RX ADMIN — METRONIDAZOLE 500 MG: 500 INJECTION, SOLUTION INTRAVENOUS at 05:07

## 2022-01-01 RX ADMIN — ONDANSETRON 4 MG: 2 INJECTION INTRAMUSCULAR; INTRAVENOUS at 03:44

## 2022-01-01 RX ADMIN — MIDAZOLAM HYDROCHLORIDE 2 MG: 2 INJECTION, SOLUTION INTRAMUSCULAR; INTRAVENOUS at 08:23

## 2022-01-01 RX ADMIN — Medication 1 TABLET: at 11:52

## 2022-01-01 RX ADMIN — HEPARIN SODIUM 5000 UNITS: 5000 INJECTION INTRAVENOUS; SUBCUTANEOUS at 21:13

## 2022-01-01 RX ADMIN — SODIUM CHLORIDE 25 ML: 9 INJECTION, SOLUTION INTRAVENOUS at 00:08

## 2022-01-01 RX ADMIN — TIZANIDINE 8 MG: 4 TABLET ORAL at 09:50

## 2022-01-01 RX ADMIN — SUCRALFATE 1 G: 1 TABLET ORAL at 17:54

## 2022-01-01 RX ADMIN — HYDROMORPHONE HYDROCHLORIDE 1 MG: 1 INJECTION, SOLUTION INTRAMUSCULAR; INTRAVENOUS; SUBCUTANEOUS at 07:56

## 2022-01-01 RX ADMIN — NAFCILLIN SODIUM 2000 MG: 2 INJECTION, POWDER, LYOPHILIZED, FOR SOLUTION INTRAMUSCULAR; INTRAVENOUS at 01:15

## 2022-01-01 RX ADMIN — ONDANSETRON HYDROCHLORIDE 4 MG: 2 INJECTION, SOLUTION INTRAMUSCULAR; INTRAVENOUS at 21:00

## 2022-01-01 RX ADMIN — TIZANIDINE 8 MG: 4 TABLET ORAL at 20:13

## 2022-01-01 RX ADMIN — INSULIN LISPRO 3 UNITS: 100 INJECTION, SOLUTION INTRAVENOUS; SUBCUTANEOUS at 08:51

## 2022-01-01 RX ADMIN — CARVEDILOL 12.5 MG: 6.25 TABLET, FILM COATED ORAL at 17:12

## 2022-01-01 RX ADMIN — AMPICILLIN SODIUM AND SULBACTAM SODIUM 3000 MG: 2; 1 INJECTION, POWDER, FOR SOLUTION INTRAMUSCULAR; INTRAVENOUS at 05:52

## 2022-01-01 RX ADMIN — OXYCODONE 10 MG: 5 TABLET ORAL at 21:23

## 2022-01-01 RX ADMIN — INSULIN LISPRO 6 UNITS: 100 INJECTION, SOLUTION INTRAVENOUS; SUBCUTANEOUS at 06:24

## 2022-01-01 RX ADMIN — PANTOPRAZOLE SODIUM 40 MG: 40 INJECTION, POWDER, FOR SOLUTION INTRAVENOUS at 20:30

## 2022-01-01 RX ADMIN — GABAPENTIN 200 MG: 100 CAPSULE ORAL at 14:23

## 2022-01-01 RX ADMIN — EPOETIN ALFA-EPBX 5000 UNITS: 10000 INJECTION, SOLUTION INTRAVENOUS; SUBCUTANEOUS at 10:04

## 2022-01-01 RX ADMIN — CHLORHEXIDINE GLUCONATE 0.12% ORAL RINSE 15 ML: 1.2 LIQUID ORAL at 20:43

## 2022-01-01 RX ADMIN — POLYETHYLENE GLYCOL 3350 17 G: 17 POWDER, FOR SOLUTION ORAL at 08:51

## 2022-01-01 RX ADMIN — NIFEDIPINE 30 MG: 30 TABLET, FILM COATED, EXTENDED RELEASE ORAL at 08:26

## 2022-01-01 RX ADMIN — HYDRALAZINE HYDROCHLORIDE 20 MG: 20 INJECTION INTRAMUSCULAR; INTRAVENOUS at 14:30

## 2022-01-01 RX ADMIN — METOCLOPRAMIDE 10 MG: 10 TABLET ORAL at 11:38

## 2022-01-01 RX ADMIN — OXYCODONE HYDROCHLORIDE AND ACETAMINOPHEN 1 TABLET: 5; 325 TABLET ORAL at 17:08

## 2022-01-01 RX ADMIN — INSULIN LISPRO 3 UNITS: 100 INJECTION, SOLUTION INTRAVENOUS; SUBCUTANEOUS at 12:10

## 2022-01-01 RX ADMIN — PANTOPRAZOLE SODIUM 40 MG: 40 TABLET, DELAYED RELEASE ORAL at 09:26

## 2022-01-01 RX ADMIN — OXYCODONE 10 MG: 5 TABLET ORAL at 08:14

## 2022-01-01 RX ADMIN — Medication 1 MCG/KG/HR: at 14:38

## 2022-01-01 RX ADMIN — GABAPENTIN 400 MG: 400 CAPSULE ORAL at 07:59

## 2022-01-01 RX ADMIN — Medication: at 08:18

## 2022-01-01 RX ADMIN — SODIUM CHLORIDE, PRESERVATIVE FREE 10 ML: 5 INJECTION INTRAVENOUS at 20:58

## 2022-01-01 RX ADMIN — FUROSEMIDE 80 MG: 10 INJECTION, SOLUTION INTRAMUSCULAR; INTRAVENOUS at 00:27

## 2022-01-01 RX ADMIN — METOCLOPRAMIDE 10 MG: 10 TABLET ORAL at 17:05

## 2022-01-01 RX ADMIN — CARVEDILOL 6.25 MG: 6.25 TABLET, FILM COATED ORAL at 09:07

## 2022-01-01 RX ADMIN — HYDROMORPHONE HYDROCHLORIDE 1 MG: 1 INJECTION, SOLUTION INTRAMUSCULAR; INTRAVENOUS; SUBCUTANEOUS at 14:29

## 2022-01-01 RX ADMIN — Medication: at 09:36

## 2022-01-01 RX ADMIN — TIZANIDINE 8 MG: 4 TABLET ORAL at 20:19

## 2022-01-01 RX ADMIN — HEPARIN SODIUM 5000 UNITS: 5000 INJECTION INTRAVENOUS; SUBCUTANEOUS at 14:30

## 2022-01-01 RX ADMIN — OXYCODONE HYDROCHLORIDE AND ACETAMINOPHEN 1 TABLET: 5; 325 TABLET ORAL at 19:43

## 2022-01-01 RX ADMIN — INSULIN GLARGINE 25 UNITS: 100 INJECTION, SOLUTION SUBCUTANEOUS at 10:54

## 2022-01-01 RX ADMIN — FAMOTIDINE 20 MG: 10 INJECTION, SOLUTION INTRAVENOUS at 08:11

## 2022-01-01 RX ADMIN — PANTOPRAZOLE SODIUM 40 MG: 40 TABLET, DELAYED RELEASE ORAL at 21:57

## 2022-01-01 RX ADMIN — Medication: at 07:00

## 2022-01-01 RX ADMIN — HEPARIN SODIUM 5000 UNITS: 5000 INJECTION INTRAVENOUS; SUBCUTANEOUS at 20:17

## 2022-01-01 RX ADMIN — GABAPENTIN 200 MG: 100 CAPSULE ORAL at 20:47

## 2022-01-01 RX ADMIN — MIDAZOLAM HYDROCHLORIDE 2 MG: 2 INJECTION, SOLUTION INTRAMUSCULAR; INTRAVENOUS at 15:00

## 2022-01-01 RX ADMIN — SODIUM CHLORIDE, PRESERVATIVE FREE 10 ML: 5 INJECTION INTRAVENOUS at 20:17

## 2022-01-01 RX ADMIN — MIDAZOLAM HYDROCHLORIDE 2 MG: 2 INJECTION, SOLUTION INTRAMUSCULAR; INTRAVENOUS at 08:25

## 2022-01-01 RX ADMIN — TRAZODONE HYDROCHLORIDE 100 MG: 50 TABLET ORAL at 20:52

## 2022-01-01 RX ADMIN — GABAPENTIN 400 MG: 400 CAPSULE ORAL at 14:34

## 2022-01-01 RX ADMIN — SUCRALFATE 1 G: 1 TABLET ORAL at 06:46

## 2022-01-01 RX ADMIN — METOPROLOL SUCCINATE 25 MG: 25 TABLET, EXTENDED RELEASE ORAL at 09:52

## 2022-01-01 RX ADMIN — METOCLOPRAMIDE 10 MG: 10 TABLET ORAL at 05:38

## 2022-01-01 RX ADMIN — AMPICILLIN SODIUM AND SULBACTAM SODIUM 3000 MG: 2; 1 INJECTION, POWDER, FOR SOLUTION INTRAMUSCULAR; INTRAVENOUS at 07:57

## 2022-01-01 RX ADMIN — INSULIN LISPRO 3 UNITS: 100 INJECTION, SOLUTION INTRAVENOUS; SUBCUTANEOUS at 06:24

## 2022-01-01 RX ADMIN — METOCLOPRAMIDE 10 MG: 10 TABLET ORAL at 05:16

## 2022-01-01 RX ADMIN — ACETAMINOPHEN 650 MG: 325 TABLET ORAL at 04:58

## 2022-01-01 RX ADMIN — MIDAZOLAM HYDROCHLORIDE 2 MG: 2 INJECTION, SOLUTION INTRAMUSCULAR; INTRAVENOUS at 10:12

## 2022-01-01 RX ADMIN — Medication 800 MG: at 20:04

## 2022-01-01 RX ADMIN — Medication 1 TABLET: at 12:27

## 2022-01-01 RX ADMIN — LOPERAMIDE HYDROCHLORIDE 2 MG: 2 CAPSULE ORAL at 12:51

## 2022-01-01 RX ADMIN — Medication 800 MG: at 14:34

## 2022-01-01 RX ADMIN — AMPICILLIN SODIUM AND SULBACTAM SODIUM 3000 MG: 2; 1 INJECTION, POWDER, FOR SOLUTION INTRAMUSCULAR; INTRAVENOUS at 20:46

## 2022-01-01 RX ADMIN — FENTANYL CITRATE 50 MCG: 50 INJECTION INTRAMUSCULAR; INTRAVENOUS at 08:05

## 2022-01-01 RX ADMIN — INSULIN LISPRO 9 UNITS: 100 INJECTION, SOLUTION INTRAVENOUS; SUBCUTANEOUS at 23:45

## 2022-01-01 RX ADMIN — HYDROMORPHONE HYDROCHLORIDE 2 MG: 2 TABLET ORAL at 18:55

## 2022-01-01 RX ADMIN — METOPROLOL SUCCINATE 50 MG: 50 TABLET, EXTENDED RELEASE ORAL at 08:14

## 2022-01-01 RX ADMIN — Medication 2 MCG/KG/HR: at 19:40

## 2022-01-01 RX ADMIN — ALBUMIN (HUMAN) 12.5 G: 0.25 INJECTION, SOLUTION INTRAVENOUS at 13:45

## 2022-01-01 RX ADMIN — METOPROLOL TARTRATE 25 MG: 25 TABLET, FILM COATED ORAL at 20:11

## 2022-01-01 RX ADMIN — INSULIN GLARGINE 25 UNITS: 100 INJECTION, SOLUTION SUBCUTANEOUS at 08:54

## 2022-01-01 RX ADMIN — METOCLOPRAMIDE 10 MG: 10 TABLET ORAL at 16:40

## 2022-01-01 RX ADMIN — Medication: at 08:25

## 2022-01-01 RX ADMIN — GABAPENTIN 100 MG: 100 CAPSULE ORAL at 21:49

## 2022-01-01 RX ADMIN — INSULIN LISPRO 3 UNITS: 100 INJECTION, SOLUTION INTRAVENOUS; SUBCUTANEOUS at 06:13

## 2022-01-01 RX ADMIN — INSULIN LISPRO 6 UNITS: 100 INJECTION, SOLUTION INTRAVENOUS; SUBCUTANEOUS at 17:49

## 2022-01-01 RX ADMIN — INSULIN GLARGINE 25 UNITS: 100 INJECTION, SOLUTION SUBCUTANEOUS at 20:41

## 2022-01-01 RX ADMIN — DILTIAZEM HYDROCHLORIDE 30 MG: 60 TABLET, FILM COATED ORAL at 00:09

## 2022-01-01 RX ADMIN — INSULIN LISPRO 3 UNITS: 100 INJECTION, SOLUTION INTRAVENOUS; SUBCUTANEOUS at 04:28

## 2022-01-01 RX ADMIN — SUCRALFATE 1 G: 1 TABLET ORAL at 20:04

## 2022-01-01 RX ADMIN — METOCLOPRAMIDE 10 MG: 10 TABLET ORAL at 19:57

## 2022-01-01 RX ADMIN — MIDAZOLAM HYDROCHLORIDE 2 MG: 1 INJECTION, SOLUTION INTRAMUSCULAR; INTRAVENOUS at 11:17

## 2022-01-01 RX ADMIN — METOCLOPRAMIDE 10 MG: 10 TABLET ORAL at 21:27

## 2022-01-01 RX ADMIN — MIDAZOLAM HYDROCHLORIDE 2 MG: 2 INJECTION, SOLUTION INTRAMUSCULAR; INTRAVENOUS at 02:49

## 2022-01-01 RX ADMIN — SODIUM CHLORIDE 8 MG/HR: 9 INJECTION, SOLUTION INTRAVENOUS at 16:43

## 2022-01-01 RX ADMIN — Medication 400 MG: at 21:56

## 2022-01-01 RX ADMIN — QUETIAPINE FUMARATE 100 MG: 100 TABLET ORAL at 21:10

## 2022-01-01 RX ADMIN — SUCRALFATE 1 G: 1 TABLET ORAL at 20:17

## 2022-01-01 RX ADMIN — PIPERACILLIN AND TAZOBACTAM 3375 MG: 3; .375 INJECTION, POWDER, LYOPHILIZED, FOR SOLUTION INTRAVENOUS at 17:08

## 2022-01-01 RX ADMIN — INSULIN LISPRO 3 UNITS: 100 INJECTION, SOLUTION INTRAVENOUS; SUBCUTANEOUS at 00:33

## 2022-01-01 RX ADMIN — NAFCILLIN SODIUM 2000 MG: 2 INJECTION, POWDER, LYOPHILIZED, FOR SOLUTION INTRAMUSCULAR; INTRAVENOUS at 18:43

## 2022-01-01 RX ADMIN — NIFEDIPINE 30 MG: 30 TABLET, FILM COATED, EXTENDED RELEASE ORAL at 22:13

## 2022-01-01 RX ADMIN — PANTOPRAZOLE SODIUM 40 MG: 40 TABLET, DELAYED RELEASE ORAL at 08:50

## 2022-01-01 RX ADMIN — FLUTICASONE PROPIONATE 2 SPRAY: 50 SPRAY, METERED NASAL at 20:05

## 2022-01-01 RX ADMIN — PANTOPRAZOLE SODIUM 40 MG: 40 TABLET, DELAYED RELEASE ORAL at 09:28

## 2022-01-01 RX ADMIN — HYDROMORPHONE HYDROCHLORIDE 2 MG: 2 TABLET ORAL at 20:01

## 2022-01-01 RX ADMIN — SODIUM CHLORIDE, PRESERVATIVE FREE 10 ML: 5 INJECTION INTRAVENOUS at 13:15

## 2022-01-01 RX ADMIN — INSULIN LISPRO 3 UNITS: 100 INJECTION, SOLUTION INTRAVENOUS; SUBCUTANEOUS at 20:18

## 2022-01-01 RX ADMIN — CHLORHEXIDINE GLUCONATE 0.12% ORAL RINSE 15 ML: 1.2 LIQUID ORAL at 20:20

## 2022-01-01 RX ADMIN — INSULIN GLARGINE 15 UNITS: 100 INJECTION, SOLUTION SUBCUTANEOUS at 20:56

## 2022-01-01 RX ADMIN — SODIUM CHLORIDE, PRESERVATIVE FREE 10 ML: 5 INJECTION INTRAVENOUS at 11:08

## 2022-01-01 RX ADMIN — SODIUM CHLORIDE, PRESERVATIVE FREE 10 ML: 5 INJECTION INTRAVENOUS at 11:42

## 2022-01-01 RX ADMIN — LISINOPRIL 5 MG: 5 TABLET ORAL at 10:00

## 2022-01-01 RX ADMIN — INSULIN GLARGINE 40 UNITS: 100 INJECTION, SOLUTION SUBCUTANEOUS at 20:08

## 2022-01-01 RX ADMIN — HYDROMORPHONE HYDROCHLORIDE 1 MG: 1 INJECTION, SOLUTION INTRAMUSCULAR; INTRAVENOUS; SUBCUTANEOUS at 00:39

## 2022-01-01 RX ADMIN — Medication 400 MG: at 09:41

## 2022-01-01 RX ADMIN — Medication 2 MCG/KG/HR: at 15:15

## 2022-01-01 RX ADMIN — DILTIAZEM HYDROCHLORIDE 30 MG: 60 TABLET, FILM COATED ORAL at 23:53

## 2022-01-01 RX ADMIN — PANTOPRAZOLE SODIUM 40 MG: 40 INJECTION, POWDER, FOR SOLUTION INTRAVENOUS at 09:36

## 2022-01-01 RX ADMIN — ASCORBIC ACID, THIAMINE MONONITRATE,RIBOFLAVIN, NIACINAMIDE, PYRIDOXINE HYDROCHLORIDE, FOLIC ACID, CYANOCOBALAMIN, BIOTIN, CALCIUM PANTOTHENATE, 1 MG: 100; 1.5; 1.7; 20; 10; 1; 6000; 150000; 5 CAPSULE, LIQUID FILLED ORAL at 09:04

## 2022-01-01 RX ADMIN — OXYCODONE 7.5 MG: 15 TABLET ORAL at 16:54

## 2022-01-01 RX ADMIN — INSULIN GLARGINE 30 UNITS: 100 INJECTION, SOLUTION SUBCUTANEOUS at 10:49

## 2022-01-01 RX ADMIN — Medication: at 09:02

## 2022-01-01 RX ADMIN — INSULIN LISPRO 9 UNITS: 100 INJECTION, SOLUTION INTRAVENOUS; SUBCUTANEOUS at 16:42

## 2022-01-01 RX ADMIN — MIDAZOLAM HYDROCHLORIDE 2 MG: 2 INJECTION, SOLUTION INTRAMUSCULAR; INTRAVENOUS at 21:01

## 2022-01-01 RX ADMIN — SODIUM CHLORIDE, PRESERVATIVE FREE 10 ML: 5 INJECTION INTRAVENOUS at 20:05

## 2022-01-01 RX ADMIN — INSULIN LISPRO 15 UNITS: 100 INJECTION, SOLUTION INTRAVENOUS; SUBCUTANEOUS at 04:48

## 2022-01-01 RX ADMIN — DEXAMETHASONE SODIUM PHOSPHATE 8 MG: 4 INJECTION, SOLUTION INTRAMUSCULAR; INTRAVENOUS at 13:27

## 2022-01-01 RX ADMIN — Medication 2 MCG/KG/HR: at 03:52

## 2022-01-01 RX ADMIN — OXYCODONE 10 MG: 5 TABLET ORAL at 15:21

## 2022-01-01 RX ADMIN — Medication 1.6 MCG/KG/HR: at 20:22

## 2022-01-01 RX ADMIN — OXYCODONE 10 MG: 5 TABLET ORAL at 17:07

## 2022-01-01 RX ADMIN — OXYCODONE 10 MG: 5 TABLET ORAL at 06:09

## 2022-01-01 RX ADMIN — INSULIN GLARGINE 15 UNITS: 100 INJECTION, SOLUTION SUBCUTANEOUS at 19:47

## 2022-01-01 RX ADMIN — PIPERACILLIN AND TAZOBACTAM 3375 MG: 3; .375 INJECTION, POWDER, LYOPHILIZED, FOR SOLUTION INTRAVENOUS at 17:51

## 2022-01-01 RX ADMIN — ONDANSETRON HYDROCHLORIDE 4 MG: 2 INJECTION, SOLUTION INTRAMUSCULAR; INTRAVENOUS at 07:52

## 2022-01-01 RX ADMIN — TAMSULOSIN HYDROCHLORIDE 0.4 MG: 0.4 CAPSULE ORAL at 20:43

## 2022-01-01 RX ADMIN — CHLORHEXIDINE GLUCONATE 0.12% ORAL RINSE 15 ML: 1.2 LIQUID ORAL at 20:40

## 2022-01-01 RX ADMIN — SODIUM CHLORIDE 8 MG/HR: 9 INJECTION, SOLUTION INTRAVENOUS at 03:02

## 2022-01-01 RX ADMIN — INSULIN LISPRO 3 UNITS: 100 INJECTION, SOLUTION INTRAVENOUS; SUBCUTANEOUS at 09:55

## 2022-01-01 RX ADMIN — CALCIUM GLUCONATE 1000 MG: 98 INJECTION, SOLUTION INTRAVENOUS at 21:12

## 2022-01-01 RX ADMIN — Medication 1 MCG/KG/HR: at 05:42

## 2022-01-01 RX ADMIN — INSULIN LISPRO 18 UNITS: 100 INJECTION, SOLUTION INTRAVENOUS; SUBCUTANEOUS at 04:39

## 2022-01-01 RX ADMIN — ALBUMIN (HUMAN) 12.5 G: 0.25 INJECTION, SOLUTION INTRAVENOUS at 12:50

## 2022-01-01 RX ADMIN — HEPARIN SODIUM 5000 UNITS: 5000 INJECTION INTRAVENOUS; SUBCUTANEOUS at 21:27

## 2022-01-01 RX ADMIN — CHLORHEXIDINE GLUCONATE 0.12% ORAL RINSE 15 ML: 1.2 LIQUID ORAL at 09:28

## 2022-01-01 RX ADMIN — HYDROMORPHONE HYDROCHLORIDE 2 MG: 2 TABLET ORAL at 09:15

## 2022-01-01 RX ADMIN — METOPROLOL TARTRATE 12.5 MG: 25 TABLET, FILM COATED ORAL at 08:45

## 2022-01-01 RX ADMIN — NOREPINEPHRINE BITARTRATE 5 MCG/MIN: 1 INJECTION, SOLUTION, CONCENTRATE INTRAVENOUS at 18:37

## 2022-01-01 RX ADMIN — HYDROMORPHONE HYDROCHLORIDE 1 MG: 1 INJECTION, SOLUTION INTRAMUSCULAR; INTRAVENOUS; SUBCUTANEOUS at 22:02

## 2022-01-01 RX ADMIN — SODIUM CHLORIDE: 4.5 INJECTION, SOLUTION INTRAVENOUS at 01:05

## 2022-01-01 RX ADMIN — HEPARIN SODIUM 5000 UNITS: 5000 INJECTION INTRAVENOUS; SUBCUTANEOUS at 21:02

## 2022-01-01 RX ADMIN — ACETAMINOPHEN 650 MG: 325 TABLET ORAL at 08:11

## 2022-01-01 RX ADMIN — HEPARIN SODIUM 2000 UNITS: 1000 INJECTION INTRAVENOUS; SUBCUTANEOUS at 07:38

## 2022-01-01 RX ADMIN — DOXYCYCLINE HYCLATE 100 MG: 100 TABLET, COATED ORAL at 09:24

## 2022-01-01 RX ADMIN — Medication 400 MG: at 21:20

## 2022-01-01 RX ADMIN — FAMOTIDINE 20 MG: 10 INJECTION, SOLUTION INTRAVENOUS at 11:42

## 2022-01-01 RX ADMIN — INSULIN LISPRO 12 UNITS: 100 INJECTION, SOLUTION INTRAVENOUS; SUBCUTANEOUS at 08:15

## 2022-01-01 RX ADMIN — SUCRALFATE 1 G: 1 TABLET ORAL at 22:51

## 2022-01-01 RX ADMIN — INSULIN LISPRO 3 UNITS: 100 INJECTION, SOLUTION INTRAVENOUS; SUBCUTANEOUS at 19:44

## 2022-01-01 RX ADMIN — HEPARIN SODIUM 5000 UNITS: 5000 INJECTION INTRAVENOUS; SUBCUTANEOUS at 21:23

## 2022-01-01 RX ADMIN — MIDAZOLAM HYDROCHLORIDE 2 MG: 2 INJECTION, SOLUTION INTRAMUSCULAR; INTRAVENOUS at 18:06

## 2022-01-01 RX ADMIN — Medication 400 MG: at 20:50

## 2022-01-01 RX ADMIN — METOPROLOL TARTRATE 5 MG: 5 INJECTION INTRAVENOUS at 06:10

## 2022-01-01 RX ADMIN — METOPROLOL SUCCINATE 50 MG: 50 TABLET, EXTENDED RELEASE ORAL at 08:50

## 2022-01-01 RX ADMIN — AMPICILLIN SODIUM AND SULBACTAM SODIUM 3000 MG: 2; 1 INJECTION, POWDER, FOR SOLUTION INTRAMUSCULAR; INTRAVENOUS at 09:08

## 2022-01-01 RX ADMIN — PANTOPRAZOLE SODIUM 40 MG: 40 INJECTION, POWDER, FOR SOLUTION INTRAVENOUS at 20:00

## 2022-01-01 RX ADMIN — PAROXETINE HYDROCHLORIDE 20 MG: 20 TABLET, FILM COATED ORAL at 09:38

## 2022-01-01 RX ADMIN — TIZANIDINE 8 MG: 4 TABLET ORAL at 08:45

## 2022-01-01 RX ADMIN — HEPARIN SODIUM 5000 UNITS: 5000 INJECTION INTRAVENOUS; SUBCUTANEOUS at 07:00

## 2022-01-01 RX ADMIN — SODIUM CHLORIDE, PRESERVATIVE FREE 10 ML: 5 INJECTION INTRAVENOUS at 09:42

## 2022-01-01 RX ADMIN — DOXYCYCLINE HYCLATE 100 MG: 100 TABLET, COATED ORAL at 20:01

## 2022-01-01 RX ADMIN — Medication 2 MCG/KG/HR: at 16:48

## 2022-01-01 RX ADMIN — INSULIN LISPRO 6 UNITS: 100 INJECTION, SOLUTION INTRAVENOUS; SUBCUTANEOUS at 09:02

## 2022-01-01 RX ADMIN — NOREPINEPHRINE BITARTRATE 7 MCG/MIN: 1 INJECTION, SOLUTION, CONCENTRATE INTRAVENOUS at 15:06

## 2022-01-01 RX ADMIN — SODIUM CHLORIDE 8 MG/HR: 9 INJECTION, SOLUTION INTRAVENOUS at 07:10

## 2022-01-01 RX ADMIN — METOPROLOL TARTRATE 12.5 MG: 25 TABLET, FILM COATED ORAL at 20:13

## 2022-01-01 RX ADMIN — SUCRALFATE 1 G: 1 TABLET ORAL at 01:35

## 2022-01-01 RX ADMIN — PANTOPRAZOLE SODIUM 40 MG: 40 INJECTION, POWDER, FOR SOLUTION INTRAVENOUS at 13:15

## 2022-01-01 RX ADMIN — INSULIN LISPRO 3 UNITS: 100 INJECTION, SOLUTION INTRAVENOUS; SUBCUTANEOUS at 23:35

## 2022-01-01 RX ADMIN — OXYCODONE HYDROCHLORIDE AND ACETAMINOPHEN 1 TABLET: 5; 325 TABLET ORAL at 20:03

## 2022-01-01 RX ADMIN — HEPARIN SODIUM 5000 UNITS: 5000 INJECTION INTRAVENOUS; SUBCUTANEOUS at 21:05

## 2022-01-01 RX ADMIN — TAMSULOSIN HYDROCHLORIDE 0.4 MG: 0.4 CAPSULE ORAL at 22:13

## 2022-01-01 RX ADMIN — HEPARIN SODIUM 5000 UNITS: 5000 INJECTION INTRAVENOUS; SUBCUTANEOUS at 06:06

## 2022-01-01 RX ADMIN — PANTOPRAZOLE SODIUM 40 MG: 40 INJECTION, POWDER, FOR SOLUTION INTRAVENOUS at 09:11

## 2022-01-01 RX ADMIN — PANTOPRAZOLE SODIUM 40 MG: 40 INJECTION, POWDER, FOR SOLUTION INTRAVENOUS at 20:14

## 2022-01-01 RX ADMIN — INSULIN GLARGINE 15 UNITS: 100 INJECTION, SOLUTION SUBCUTANEOUS at 20:49

## 2022-01-01 RX ADMIN — DILTIAZEM HYDROCHLORIDE 30 MG: 60 TABLET, FILM COATED ORAL at 12:44

## 2022-01-01 RX ADMIN — SODIUM CHLORIDE, PRESERVATIVE FREE 10 ML: 5 INJECTION INTRAVENOUS at 20:30

## 2022-01-01 RX ADMIN — INSULIN LISPRO 3 UNITS: 100 INJECTION, SOLUTION INTRAVENOUS; SUBCUTANEOUS at 11:46

## 2022-01-01 RX ADMIN — DILTIAZEM HYDROCHLORIDE 30 MG: 60 TABLET, FILM COATED ORAL at 13:17

## 2022-01-01 RX ADMIN — Medication 400 MG: at 22:13

## 2022-01-01 RX ADMIN — Medication 0.7 MCG/KG/HR: at 03:45

## 2022-01-01 RX ADMIN — HEPARIN SODIUM 5000 UNITS: 5000 INJECTION INTRAVENOUS; SUBCUTANEOUS at 12:50

## 2022-01-01 RX ADMIN — AMPICILLIN SODIUM AND SULBACTAM SODIUM 3000 MG: 2; 1 INJECTION, POWDER, FOR SOLUTION INTRAMUSCULAR; INTRAVENOUS at 05:24

## 2022-01-01 RX ADMIN — INSULIN LISPRO 4 UNITS: 100 INJECTION, SOLUTION INTRAVENOUS; SUBCUTANEOUS at 06:14

## 2022-01-01 RX ADMIN — SODIUM CHLORIDE, PRESERVATIVE FREE 10 ML: 5 INJECTION INTRAVENOUS at 21:03

## 2022-01-01 RX ADMIN — TIZANIDINE 4 MG: 4 TABLET ORAL at 20:42

## 2022-01-01 RX ADMIN — INSULIN LISPRO 3 UNITS: 100 INJECTION, SOLUTION INTRAVENOUS; SUBCUTANEOUS at 11:41

## 2022-01-01 RX ADMIN — CHLORHEXIDINE GLUCONATE 0.12% ORAL RINSE 15 ML: 1.2 LIQUID ORAL at 08:04

## 2022-01-01 RX ADMIN — INSULIN LISPRO 4 UNITS: 100 INJECTION, SOLUTION INTRAVENOUS; SUBCUTANEOUS at 11:55

## 2022-01-01 RX ADMIN — OXYCODONE 10 MG: 5 TABLET ORAL at 16:02

## 2022-01-01 RX ADMIN — MIDAZOLAM HYDROCHLORIDE 2 MG: 2 INJECTION, SOLUTION INTRAMUSCULAR; INTRAVENOUS at 10:56

## 2022-01-01 RX ADMIN — GABAPENTIN 100 MG: 100 CAPSULE ORAL at 08:26

## 2022-01-01 RX ADMIN — METOPROLOL TARTRATE 5 MG: 5 INJECTION INTRAVENOUS at 22:56

## 2022-01-01 RX ADMIN — INSULIN LISPRO 4 UNITS: 100 INJECTION, SOLUTION INTRAVENOUS; SUBCUTANEOUS at 17:36

## 2022-01-01 RX ADMIN — SUCRALFATE 1 G: 1 TABLET ORAL at 17:08

## 2022-01-01 RX ADMIN — AMPICILLIN SODIUM AND SULBACTAM SODIUM 3000 MG: 2; 1 INJECTION, POWDER, FOR SOLUTION INTRAMUSCULAR; INTRAVENOUS at 05:03

## 2022-01-01 RX ADMIN — PROPOFOL 25 MCG/KG/MIN: 10 INJECTION, EMULSION INTRAVENOUS at 02:55

## 2022-01-01 RX ADMIN — METOPROLOL TARTRATE 5 MG: 5 INJECTION INTRAVENOUS at 04:23

## 2022-01-01 RX ADMIN — HYDROMORPHONE HYDROCHLORIDE 2 MG: 2 TABLET ORAL at 19:59

## 2022-01-01 RX ADMIN — DILTIAZEM HYDROCHLORIDE 30 MG: 60 TABLET, FILM COATED ORAL at 12:13

## 2022-01-01 RX ADMIN — TAMSULOSIN HYDROCHLORIDE 0.4 MG: 0.4 CAPSULE ORAL at 20:53

## 2022-01-01 RX ADMIN — SODIUM CHLORIDE, POTASSIUM CHLORIDE, SODIUM LACTATE AND CALCIUM CHLORIDE: 600; 310; 30; 20 INJECTION, SOLUTION INTRAVENOUS at 12:30

## 2022-01-01 RX ADMIN — MIDAZOLAM HYDROCHLORIDE 2 MG: 2 INJECTION, SOLUTION INTRAMUSCULAR; INTRAVENOUS at 16:38

## 2022-01-01 RX ADMIN — CARVEDILOL 12.5 MG: 6.25 TABLET, FILM COATED ORAL at 15:57

## 2022-01-01 RX ADMIN — SODIUM CHLORIDE, PRESERVATIVE FREE 10 ML: 5 INJECTION INTRAVENOUS at 21:01

## 2022-01-01 RX ADMIN — SENNOSIDES 17.2 MG: 8.6 TABLET, COATED ORAL at 20:52

## 2022-01-01 RX ADMIN — CARVEDILOL 12.5 MG: 6.25 TABLET, FILM COATED ORAL at 08:27

## 2022-01-01 RX ADMIN — OXYCODONE HYDROCHLORIDE AND ACETAMINOPHEN 1 TABLET: 5; 325 TABLET ORAL at 08:43

## 2022-01-01 RX ADMIN — DOXYCYCLINE HYCLATE 100 MG: 100 TABLET, COATED ORAL at 08:47

## 2022-01-01 RX ADMIN — PROPOFOL INJECTABLE EMULSION 25 MCG/KG/MIN: 10 INJECTION, EMULSION INTRAVENOUS at 05:51

## 2022-01-01 RX ADMIN — Medication 2 MCG/KG/HR: at 22:49

## 2022-01-01 RX ADMIN — PAROXETINE HYDROCHLORIDE 10 MG: 20 TABLET, FILM COATED ORAL at 13:15

## 2022-01-01 RX ADMIN — SODIUM CHLORIDE, PRESERVATIVE FREE 10 ML: 5 INJECTION INTRAVENOUS at 09:01

## 2022-01-01 RX ADMIN — SODIUM CHLORIDE, PRESERVATIVE FREE 10 ML: 5 INJECTION INTRAVENOUS at 20:12

## 2022-01-01 RX ADMIN — Medication: at 00:33

## 2022-01-01 RX ADMIN — SODIUM CHLORIDE, PRESERVATIVE FREE 10 ML: 5 INJECTION INTRAVENOUS at 08:08

## 2022-01-01 RX ADMIN — INSULIN LISPRO 4 UNITS: 100 INJECTION, SOLUTION INTRAVENOUS; SUBCUTANEOUS at 11:51

## 2022-01-01 RX ADMIN — METOCLOPRAMIDE 10 MG: 10 TABLET ORAL at 06:34

## 2022-01-01 RX ADMIN — GABAPENTIN 400 MG: 400 CAPSULE ORAL at 13:41

## 2022-01-01 RX ADMIN — INSULIN LISPRO 3 UNITS: 100 INJECTION, SOLUTION INTRAVENOUS; SUBCUTANEOUS at 16:34

## 2022-01-01 RX ADMIN — Medication 800 MG: at 14:52

## 2022-01-01 RX ADMIN — DILTIAZEM HYDROCHLORIDE 30 MG: 60 TABLET, FILM COATED ORAL at 02:28

## 2022-01-01 RX ADMIN — PAROXETINE HYDROCHLORIDE 20 MG: 20 TABLET, FILM COATED ORAL at 07:56

## 2022-01-01 RX ADMIN — INSULIN GLARGINE 25 UNITS: 100 INJECTION, SOLUTION SUBCUTANEOUS at 21:02

## 2022-01-01 RX ADMIN — HEPARIN SODIUM 2600 UNITS: 1000 INJECTION INTRAVENOUS; SUBCUTANEOUS at 12:08

## 2022-01-01 RX ADMIN — HEPARIN SODIUM 5000 UNITS: 5000 INJECTION INTRAVENOUS; SUBCUTANEOUS at 14:26

## 2022-01-01 RX ADMIN — ALBUMIN (HUMAN) 12.5 G: 0.25 INJECTION, SOLUTION INTRAVENOUS at 13:52

## 2022-01-01 RX ADMIN — HEPARIN SODIUM 1000 UNITS/HR: 10000 INJECTION INTRAVENOUS; SUBCUTANEOUS at 14:46

## 2022-01-01 RX ADMIN — HYDROMORPHONE HYDROCHLORIDE 1 MG: 1 INJECTION, SOLUTION INTRAMUSCULAR; INTRAVENOUS; SUBCUTANEOUS at 02:44

## 2022-01-01 RX ADMIN — ROCURONIUM BROMIDE 70 MG: 10 INJECTION, SOLUTION INTRAVENOUS at 12:20

## 2022-01-01 RX ADMIN — GABAPENTIN 200 MG: 100 CAPSULE ORAL at 20:22

## 2022-01-01 RX ADMIN — NAFCILLIN SODIUM 2000 MG: 2 INJECTION, POWDER, LYOPHILIZED, FOR SOLUTION INTRAMUSCULAR; INTRAVENOUS at 13:32

## 2022-01-01 RX ADMIN — PROCHLORPERAZINE MALEATE 5 MG: 5 TABLET ORAL at 05:38

## 2022-01-01 RX ADMIN — MUPIROCIN: 20 OINTMENT TOPICAL at 19:48

## 2022-01-01 RX ADMIN — AMPICILLIN SODIUM AND SULBACTAM SODIUM 3000 MG: 2; 1 INJECTION, POWDER, FOR SOLUTION INTRAMUSCULAR; INTRAVENOUS at 11:28

## 2022-01-01 RX ADMIN — PAROXETINE HYDROCHLORIDE 20 MG: 20 TABLET, FILM COATED ORAL at 09:11

## 2022-01-01 RX ADMIN — NAFCILLIN SODIUM 2000 MG: 2 INJECTION, POWDER, LYOPHILIZED, FOR SOLUTION INTRAMUSCULAR; INTRAVENOUS at 06:36

## 2022-01-01 RX ADMIN — Medication: at 08:04

## 2022-01-01 RX ADMIN — PROCHLORPERAZINE MALEATE 5 MG: 5 TABLET ORAL at 21:11

## 2022-01-01 RX ADMIN — QUETIAPINE FUMARATE 12.5 MG: 25 TABLET ORAL at 09:41

## 2022-01-01 RX ADMIN — HYDROMORPHONE HYDROCHLORIDE 2 MG: 2 TABLET ORAL at 06:17

## 2022-01-01 RX ADMIN — INSULIN LISPRO 3 UNITS: 100 INJECTION, SOLUTION INTRAVENOUS; SUBCUTANEOUS at 16:16

## 2022-01-01 RX ADMIN — INSULIN LISPRO 3 UNITS: 100 INJECTION, SOLUTION INTRAVENOUS; SUBCUTANEOUS at 09:16

## 2022-01-01 RX ADMIN — Medication: at 09:45

## 2022-01-01 RX ADMIN — OXYCODONE 5 MG: 5 TABLET ORAL at 16:47

## 2022-01-01 RX ADMIN — PANTOPRAZOLE SODIUM 40 MG: 40 INJECTION, POWDER, FOR SOLUTION INTRAVENOUS at 08:33

## 2022-01-01 RX ADMIN — HYDROMORPHONE HYDROCHLORIDE 2 MG: 2 TABLET ORAL at 20:05

## 2022-01-01 RX ADMIN — PANTOPRAZOLE SODIUM 40 MG: 40 TABLET, DELAYED RELEASE ORAL at 20:18

## 2022-01-01 RX ADMIN — INSULIN LISPRO 3 UNITS: 100 INJECTION, SOLUTION INTRAVENOUS; SUBCUTANEOUS at 05:19

## 2022-01-01 RX ADMIN — NIFEDIPINE 30 MG: 30 TABLET, FILM COATED, EXTENDED RELEASE ORAL at 21:10

## 2022-01-01 RX ADMIN — TIZANIDINE 4 MG: 4 TABLET ORAL at 09:31

## 2022-01-01 RX ADMIN — METOCLOPRAMIDE 10 MG: 10 TABLET ORAL at 09:39

## 2022-01-01 RX ADMIN — HEPARIN SODIUM 5000 UNITS: 5000 INJECTION INTRAVENOUS; SUBCUTANEOUS at 20:09

## 2022-01-01 RX ADMIN — HEPARIN SODIUM 5000 UNITS: 5000 INJECTION INTRAVENOUS; SUBCUTANEOUS at 15:14

## 2022-01-01 RX ADMIN — OXYCODONE 7.5 MG: 15 TABLET ORAL at 04:28

## 2022-01-01 RX ADMIN — FLUTICASONE PROPIONATE 2 SPRAY: 50 SPRAY, METERED NASAL at 20:53

## 2022-01-01 RX ADMIN — SUCRALFATE 1 G: 1 TABLET ORAL at 16:51

## 2022-01-01 RX ADMIN — INSULIN LISPRO 3 UNITS: 100 INJECTION, SOLUTION INTRAVENOUS; SUBCUTANEOUS at 21:14

## 2022-01-01 RX ADMIN — METOPROLOL TARTRATE 12.5 MG: 25 TABLET, FILM COATED ORAL at 20:08

## 2022-01-01 RX ADMIN — CHLORHEXIDINE GLUCONATE 0.12% ORAL RINSE 15 ML: 1.2 LIQUID ORAL at 08:09

## 2022-01-01 RX ADMIN — SENNOSIDES 17.2 MG: 8.6 TABLET, COATED ORAL at 08:49

## 2022-01-01 RX ADMIN — HYDROMORPHONE HYDROCHLORIDE 1 MG: 1 INJECTION, SOLUTION INTRAMUSCULAR; INTRAVENOUS; SUBCUTANEOUS at 18:30

## 2022-01-01 RX ADMIN — NAFCILLIN SODIUM 2000 MG: 2 INJECTION, POWDER, LYOPHILIZED, FOR SOLUTION INTRAMUSCULAR; INTRAVENOUS at 09:16

## 2022-01-01 RX ADMIN — OXYCODONE 10 MG: 5 TABLET ORAL at 03:45

## 2022-01-01 RX ADMIN — OXYCODONE HYDROCHLORIDE AND ACETAMINOPHEN 1 TABLET: 5; 325 TABLET ORAL at 08:14

## 2022-01-01 RX ADMIN — PAROXETINE HYDROCHLORIDE 20 MG: 20 TABLET, FILM COATED ORAL at 08:53

## 2022-01-01 RX ADMIN — OXYCODONE 10 MG: 5 TABLET ORAL at 09:38

## 2022-01-01 RX ADMIN — PANTOPRAZOLE SODIUM 40 MG: 40 INJECTION, POWDER, FOR SOLUTION INTRAVENOUS at 20:41

## 2022-01-01 RX ADMIN — HYDROMORPHONE HYDROCHLORIDE 1 MG: 1 INJECTION, SOLUTION INTRAMUSCULAR; INTRAVENOUS; SUBCUTANEOUS at 18:23

## 2022-01-01 RX ADMIN — FUROSEMIDE 40 MG: 40 TABLET ORAL at 05:22

## 2022-01-01 RX ADMIN — DOXYCYCLINE HYCLATE 100 MG: 100 TABLET, COATED ORAL at 21:03

## 2022-01-01 RX ADMIN — INSULIN LISPRO 3 UNITS: 100 INJECTION, SOLUTION INTRAVENOUS; SUBCUTANEOUS at 04:00

## 2022-01-01 RX ADMIN — ONDANSETRON 4 MG: 2 INJECTION INTRAMUSCULAR; INTRAVENOUS at 20:52

## 2022-01-01 RX ADMIN — PANTOPRAZOLE SODIUM 40 MG: 40 TABLET, DELAYED RELEASE ORAL at 16:27

## 2022-01-01 RX ADMIN — CALCIUM GLUCONATE 1000 MG: 98 INJECTION, SOLUTION INTRAVENOUS at 15:31

## 2022-01-01 RX ADMIN — PAROXETINE HYDROCHLORIDE 20 MG: 20 TABLET, FILM COATED ORAL at 07:50

## 2022-01-01 RX ADMIN — INSULIN LISPRO 4 UNITS: 100 INJECTION, SOLUTION INTRAVENOUS; SUBCUTANEOUS at 11:27

## 2022-01-01 RX ADMIN — QUETIAPINE FUMARATE 25 MG: 25 TABLET ORAL at 09:52

## 2022-01-01 RX ADMIN — GABAPENTIN 400 MG: 400 CAPSULE ORAL at 19:58

## 2022-01-01 RX ADMIN — OXYCODONE 10 MG: 5 TABLET ORAL at 11:21

## 2022-01-01 RX ADMIN — DILTIAZEM HYDROCHLORIDE 30 MG: 60 TABLET, FILM COATED ORAL at 11:27

## 2022-01-01 RX ADMIN — INSULIN LISPRO 3 UNITS: 100 INJECTION, SOLUTION INTRAVENOUS; SUBCUTANEOUS at 06:05

## 2022-01-01 RX ADMIN — INSULIN LISPRO 3 UNITS: 100 INJECTION, SOLUTION INTRAVENOUS; SUBCUTANEOUS at 21:02

## 2022-01-01 RX ADMIN — HYDROMORPHONE HYDROCHLORIDE 1 MG: 1 INJECTION, SOLUTION INTRAMUSCULAR; INTRAVENOUS; SUBCUTANEOUS at 10:18

## 2022-01-01 RX ADMIN — INSULIN LISPRO 4 UNITS: 100 INJECTION, SOLUTION INTRAVENOUS; SUBCUTANEOUS at 06:17

## 2022-01-01 RX ADMIN — INSULIN LISPRO 2 UNITS: 100 INJECTION, SOLUTION INTRAVENOUS; SUBCUTANEOUS at 20:35

## 2022-01-01 RX ADMIN — LISINOPRIL 5 MG: 5 TABLET ORAL at 09:40

## 2022-01-01 RX ADMIN — PROPOFOL INJECTABLE EMULSION 20 MCG/KG/MIN: 10 INJECTION, EMULSION INTRAVENOUS at 00:37

## 2022-01-01 RX ADMIN — QUETIAPINE FUMARATE 100 MG: 100 TABLET ORAL at 21:27

## 2022-01-01 RX ADMIN — GABAPENTIN 400 MG: 400 CAPSULE ORAL at 09:59

## 2022-01-01 RX ADMIN — INSULIN LISPRO 4 UNITS: 100 INJECTION, SOLUTION INTRAVENOUS; SUBCUTANEOUS at 12:50

## 2022-01-01 RX ADMIN — PAROXETINE HYDROCHLORIDE 20 MG: 20 TABLET, FILM COATED ORAL at 09:26

## 2022-01-01 RX ADMIN — INSULIN LISPRO 6 UNITS: 100 INJECTION, SOLUTION INTRAVENOUS; SUBCUTANEOUS at 12:15

## 2022-01-01 RX ADMIN — TIZANIDINE 8 MG: 4 TABLET ORAL at 08:44

## 2022-01-01 RX ADMIN — PANTOPRAZOLE SODIUM 40 MG: 40 TABLET, DELAYED RELEASE ORAL at 08:35

## 2022-01-01 RX ADMIN — INSULIN LISPRO 6 UNITS: 100 INJECTION, SOLUTION INTRAVENOUS; SUBCUTANEOUS at 12:50

## 2022-01-01 RX ADMIN — QUETIAPINE FUMARATE 12.5 MG: 25 TABLET ORAL at 15:14

## 2022-01-01 RX ADMIN — AMPICILLIN SODIUM AND SULBACTAM SODIUM 3000 MG: 2; 1 INJECTION, POWDER, FOR SOLUTION INTRAMUSCULAR; INTRAVENOUS at 08:41

## 2022-01-01 RX ADMIN — SODIUM CHLORIDE, PRESERVATIVE FREE 10 ML: 5 INJECTION INTRAVENOUS at 22:03

## 2022-01-01 RX ADMIN — Medication 1 TABLET: at 12:51

## 2022-01-01 RX ADMIN — PANTOPRAZOLE SODIUM 40 MG: 40 INJECTION, POWDER, FOR SOLUTION INTRAVENOUS at 21:04

## 2022-01-01 RX ADMIN — FUROSEMIDE 40 MG: 40 TABLET ORAL at 06:27

## 2022-01-01 RX ADMIN — Medication 1 TABLET: at 12:16

## 2022-01-01 RX ADMIN — ONDANSETRON 4 MG: 4 TABLET, ORALLY DISINTEGRATING ORAL at 10:37

## 2022-01-01 RX ADMIN — Medication 2 MCG/KG/HR: at 09:12

## 2022-01-01 RX ADMIN — DILTIAZEM HYDROCHLORIDE 30 MG: 60 TABLET, FILM COATED ORAL at 05:20

## 2022-01-01 RX ADMIN — LABETALOL HYDROCHLORIDE 20 MG: 5 INJECTION, SOLUTION INTRAVENOUS at 17:14

## 2022-01-01 RX ADMIN — NAFCILLIN SODIUM 2000 MG: 2 INJECTION, POWDER, LYOPHILIZED, FOR SOLUTION INTRAMUSCULAR; INTRAVENOUS at 19:00

## 2022-01-01 RX ADMIN — METOCLOPRAMIDE 10 MG: 10 TABLET ORAL at 16:36

## 2022-01-01 RX ADMIN — PANTOPRAZOLE SODIUM 40 MG: 40 INJECTION, POWDER, FOR SOLUTION INTRAVENOUS at 20:40

## 2022-01-01 RX ADMIN — Medication 800 MG: at 20:52

## 2022-01-01 RX ADMIN — INSULIN LISPRO 2 UNITS: 100 INJECTION, SOLUTION INTRAVENOUS; SUBCUTANEOUS at 20:05

## 2022-01-01 RX ADMIN — SUCRALFATE 1 G: 1 TABLET ORAL at 15:57

## 2022-01-01 RX ADMIN — INSULIN LISPRO 3 UNITS: 100 INJECTION, SOLUTION INTRAVENOUS; SUBCUTANEOUS at 19:31

## 2022-01-01 RX ADMIN — LISINOPRIL 5 MG: 5 TABLET ORAL at 08:58

## 2022-01-01 RX ADMIN — Medication 400 MG: at 08:24

## 2022-01-01 RX ADMIN — TIZANIDINE 4 MG: 4 TABLET ORAL at 09:06

## 2022-01-01 RX ADMIN — SUCRALFATE 1 G: 1 TABLET ORAL at 16:13

## 2022-01-01 RX ADMIN — PAROXETINE HYDROCHLORIDE 20 MG: 20 TABLET, FILM COATED ORAL at 09:41

## 2022-01-01 RX ADMIN — DILTIAZEM HYDROCHLORIDE 30 MG: 60 TABLET, FILM COATED ORAL at 13:19

## 2022-01-01 RX ADMIN — INSULIN LISPRO 3 UNITS: 100 INJECTION, SOLUTION INTRAVENOUS; SUBCUTANEOUS at 12:12

## 2022-01-01 RX ADMIN — METOCLOPRAMIDE 10 MG: 10 TABLET ORAL at 11:46

## 2022-01-01 RX ADMIN — OXYCODONE 10 MG: 5 TABLET ORAL at 16:43

## 2022-01-01 RX ADMIN — SENNOSIDES 17.2 MG: 8.6 TABLET, COATED ORAL at 08:58

## 2022-01-01 RX ADMIN — OXYCODONE 5 MG: 5 TABLET ORAL at 06:34

## 2022-01-01 RX ADMIN — Medication 2000 MG: at 01:00

## 2022-01-01 RX ADMIN — METOCLOPRAMIDE 10 MG: 10 TABLET ORAL at 06:31

## 2022-01-01 RX ADMIN — OXYCODONE 10 MG: 5 TABLET ORAL at 03:47

## 2022-01-01 RX ADMIN — Medication 1 TABLET: at 14:31

## 2022-01-01 RX ADMIN — OXYCODONE 10 MG: 5 TABLET ORAL at 10:16

## 2022-01-01 RX ADMIN — HYDROMORPHONE HYDROCHLORIDE 1 MG: 1 INJECTION, SOLUTION INTRAMUSCULAR; INTRAVENOUS; SUBCUTANEOUS at 00:31

## 2022-01-01 RX ADMIN — INSULIN LISPRO 6 UNITS: 100 INJECTION, SOLUTION INTRAVENOUS; SUBCUTANEOUS at 08:29

## 2022-01-01 RX ADMIN — INSULIN LISPRO 3 UNITS: 100 INJECTION, SOLUTION INTRAVENOUS; SUBCUTANEOUS at 20:17

## 2022-01-01 RX ADMIN — METOCLOPRAMIDE 10 MG: 10 TABLET ORAL at 16:34

## 2022-01-01 RX ADMIN — FAMOTIDINE 20 MG: 10 INJECTION, SOLUTION INTRAVENOUS at 08:33

## 2022-01-01 RX ADMIN — HYDROMORPHONE HYDROCHLORIDE 1 MG: 1 INJECTION, SOLUTION INTRAMUSCULAR; INTRAVENOUS; SUBCUTANEOUS at 21:08

## 2022-01-01 RX ADMIN — QUETIAPINE FUMARATE 100 MG: 100 TABLET ORAL at 21:29

## 2022-01-01 RX ADMIN — Medication: at 08:51

## 2022-01-01 RX ADMIN — Medication 1 TABLET: at 12:15

## 2022-01-01 RX ADMIN — SODIUM CHLORIDE, PRESERVATIVE FREE 10 ML: 5 INJECTION INTRAVENOUS at 09:06

## 2022-01-01 RX ADMIN — NIFEDIPINE 30 MG: 30 TABLET, FILM COATED, EXTENDED RELEASE ORAL at 21:28

## 2022-01-01 RX ADMIN — HYDROMORPHONE HYDROCHLORIDE 1 MG: 1 INJECTION, SOLUTION INTRAMUSCULAR; INTRAVENOUS; SUBCUTANEOUS at 21:01

## 2022-01-01 RX ADMIN — Medication 2 MCG/KG/HR: at 05:04

## 2022-01-01 RX ADMIN — METOCLOPRAMIDE 10 MG: 10 TABLET ORAL at 20:48

## 2022-01-01 RX ADMIN — OXYCODONE HYDROCHLORIDE AND ACETAMINOPHEN 1 TABLET: 5; 325 TABLET ORAL at 14:06

## 2022-01-01 RX ADMIN — INSULIN LISPRO 12 UNITS: 100 INJECTION, SOLUTION INTRAVENOUS; SUBCUTANEOUS at 11:45

## 2022-01-01 RX ADMIN — INSULIN GLARGINE 25 UNITS: 100 INJECTION, SOLUTION SUBCUTANEOUS at 20:35

## 2022-01-01 RX ADMIN — INSULIN LISPRO 3 UNITS: 100 INJECTION, SOLUTION INTRAVENOUS; SUBCUTANEOUS at 20:05

## 2022-01-01 RX ADMIN — INSULIN LISPRO 6 UNITS: 100 INJECTION, SOLUTION INTRAVENOUS; SUBCUTANEOUS at 20:03

## 2022-01-01 RX ADMIN — HEPARIN SODIUM 4000 UNITS: 1000 INJECTION INTRAVENOUS; SUBCUTANEOUS at 14:31

## 2022-01-01 RX ADMIN — NIFEDIPINE 30 MG: 30 TABLET, FILM COATED, EXTENDED RELEASE ORAL at 16:45

## 2022-01-01 RX ADMIN — TIZANIDINE 4 MG: 4 TABLET ORAL at 20:22

## 2022-01-01 RX ADMIN — CARVEDILOL 12.5 MG: 6.25 TABLET, FILM COATED ORAL at 17:47

## 2022-01-01 RX ADMIN — GABAPENTIN 200 MG: 100 CAPSULE ORAL at 21:02

## 2022-01-01 RX ADMIN — PROPOFOL INJECTABLE EMULSION 50 MCG/KG/MIN: 10 INJECTION, EMULSION INTRAVENOUS at 03:51

## 2022-01-01 RX ADMIN — HEPARIN SODIUM 5000 UNITS: 5000 INJECTION INTRAVENOUS; SUBCUTANEOUS at 20:05

## 2022-01-01 RX ADMIN — NAFCILLIN SODIUM 2000 MG: 2 INJECTION, POWDER, LYOPHILIZED, FOR SOLUTION INTRAMUSCULAR; INTRAVENOUS at 03:28

## 2022-01-01 RX ADMIN — ACETAMINOPHEN 650 MG: 325 TABLET ORAL at 02:48

## 2022-01-01 RX ADMIN — MUPIROCIN: 20 OINTMENT TOPICAL at 20:22

## 2022-01-01 RX ADMIN — INSULIN LISPRO 2 UNITS: 100 INJECTION, SOLUTION INTRAVENOUS; SUBCUTANEOUS at 20:57

## 2022-01-01 RX ADMIN — Medication 1 TABLET: at 15:22

## 2022-01-01 RX ADMIN — INSULIN GLARGINE 25 UNITS: 100 INJECTION, SOLUTION SUBCUTANEOUS at 20:19

## 2022-01-01 RX ADMIN — PANTOPRAZOLE SODIUM 40 MG: 40 INJECTION, POWDER, FOR SOLUTION INTRAVENOUS at 08:10

## 2022-01-01 RX ADMIN — SODIUM CHLORIDE: 9 INJECTION, SOLUTION INTRAVENOUS at 07:35

## 2022-01-01 RX ADMIN — DILTIAZEM HYDROCHLORIDE 30 MG: 60 TABLET, FILM COATED ORAL at 13:22

## 2022-01-01 RX ADMIN — Medication 400 MG: at 09:50

## 2022-01-01 RX ADMIN — LOPERAMIDE HYDROCHLORIDE 2 MG: 2 CAPSULE ORAL at 21:09

## 2022-01-01 RX ADMIN — Medication 2 MCG/KG/HR: at 02:18

## 2022-01-01 RX ADMIN — PAROXETINE HYDROCHLORIDE 20 MG: 20 TABLET, FILM COATED ORAL at 08:24

## 2022-01-01 RX ADMIN — MIDAZOLAM HYDROCHLORIDE 2 MG: 2 INJECTION, SOLUTION INTRAMUSCULAR; INTRAVENOUS at 23:18

## 2022-01-01 RX ADMIN — Medication 1.6 MCG/KG/HR: at 15:22

## 2022-01-01 RX ADMIN — OLANZAPINE 5 MG: 5 TABLET, FILM COATED ORAL at 20:08

## 2022-01-01 RX ADMIN — CEFEPIME HYDROCHLORIDE 1000 MG: 1 INJECTION, POWDER, FOR SOLUTION INTRAMUSCULAR; INTRAVENOUS at 06:48

## 2022-01-01 RX ADMIN — METOCLOPRAMIDE 10 MG: 10 TABLET ORAL at 17:28

## 2022-01-01 RX ADMIN — METOCLOPRAMIDE 10 MG: 10 TABLET ORAL at 20:53

## 2022-01-01 RX ADMIN — PROCHLORPERAZINE MALEATE 5 MG: 5 TABLET ORAL at 09:34

## 2022-01-01 RX ADMIN — SUCRALFATE 1 G: 1 TABLET ORAL at 10:49

## 2022-01-01 RX ADMIN — INSULIN HUMAN 10 UNITS: 100 INJECTION, SUSPENSION SUBCUTANEOUS at 11:10

## 2022-01-01 RX ADMIN — Medication: at 08:34

## 2022-01-01 RX ADMIN — INSULIN LISPRO 3 UNITS: 100 INJECTION, SOLUTION INTRAVENOUS; SUBCUTANEOUS at 23:24

## 2022-01-01 RX ADMIN — SODIUM CHLORIDE 4.8 UNITS/HR: 9 INJECTION, SOLUTION INTRAVENOUS at 10:01

## 2022-01-01 RX ADMIN — OXYCODONE 10 MG: 5 TABLET ORAL at 10:21

## 2022-01-01 RX ADMIN — GABAPENTIN 200 MG: 100 CAPSULE ORAL at 13:21

## 2022-01-01 RX ADMIN — GABAPENTIN 200 MG: 100 CAPSULE ORAL at 08:39

## 2022-01-01 RX ADMIN — PANTOPRAZOLE SODIUM 40 MG: 40 TABLET, DELAYED RELEASE ORAL at 16:40

## 2022-01-01 RX ADMIN — MIDAZOLAM 1 MG: 1 INJECTION INTRAMUSCULAR; INTRAVENOUS at 10:51

## 2022-01-01 RX ADMIN — INSULIN LISPRO 3 UNITS: 100 INJECTION, SOLUTION INTRAVENOUS; SUBCUTANEOUS at 20:02

## 2022-01-01 RX ADMIN — HYDROMORPHONE HYDROCHLORIDE 1 MG: 1 INJECTION, SOLUTION INTRAMUSCULAR; INTRAVENOUS; SUBCUTANEOUS at 02:20

## 2022-01-01 RX ADMIN — INSULIN LISPRO 6 UNITS: 100 INJECTION, SOLUTION INTRAVENOUS; SUBCUTANEOUS at 16:46

## 2022-01-01 RX ADMIN — SUCRALFATE 1 G: 1 TABLET ORAL at 16:27

## 2022-01-01 RX ADMIN — QUETIAPINE FUMARATE 100 MG: 100 TABLET ORAL at 20:50

## 2022-01-01 RX ADMIN — METOCLOPRAMIDE 10 MG: 10 TABLET ORAL at 06:07

## 2022-01-01 RX ADMIN — Medication 1.2 MCG/KG/HR: at 17:43

## 2022-01-01 RX ADMIN — TRAZODONE HYDROCHLORIDE 100 MG: 50 TABLET ORAL at 20:12

## 2022-01-01 RX ADMIN — METOPROLOL TARTRATE 5 MG: 5 INJECTION INTRAVENOUS at 15:37

## 2022-01-01 RX ADMIN — Medication 800 MG: at 21:18

## 2022-01-01 RX ADMIN — INSULIN LISPRO 3 UNITS: 100 INJECTION, SOLUTION INTRAVENOUS; SUBCUTANEOUS at 13:30

## 2022-01-01 RX ADMIN — CALCIUM GLUCONATE 1000 MG: 98 INJECTION, SOLUTION INTRAVENOUS at 17:37

## 2022-01-01 RX ADMIN — SODIUM CHLORIDE: 9 INJECTION, SOLUTION INTRAVENOUS at 12:13

## 2022-01-01 RX ADMIN — Medication 0.2 MCG/KG/HR: at 11:12

## 2022-01-01 RX ADMIN — ALBUMIN (HUMAN) 12.5 G: 0.25 INJECTION, SOLUTION INTRAVENOUS at 12:55

## 2022-01-01 RX ADMIN — SENNOSIDES 17.2 MG: 8.6 TABLET, COATED ORAL at 09:59

## 2022-01-01 RX ADMIN — INSULIN LISPRO 6 UNITS: 100 INJECTION, SOLUTION INTRAVENOUS; SUBCUTANEOUS at 20:49

## 2022-01-01 RX ADMIN — INSULIN LISPRO 3 UNITS: 100 INJECTION, SOLUTION INTRAVENOUS; SUBCUTANEOUS at 16:36

## 2022-01-01 RX ADMIN — MIDAZOLAM HYDROCHLORIDE 2 MG: 2 INJECTION, SOLUTION INTRAMUSCULAR; INTRAVENOUS at 05:10

## 2022-01-01 RX ADMIN — METOPROLOL SUCCINATE 25 MG: 25 TABLET, EXTENDED RELEASE ORAL at 20:22

## 2022-01-01 RX ADMIN — METOCLOPRAMIDE 10 MG: 10 TABLET ORAL at 09:11

## 2022-01-01 RX ADMIN — INSULIN LISPRO 3 UNITS: 100 INJECTION, SOLUTION INTRAVENOUS; SUBCUTANEOUS at 11:02

## 2022-01-01 RX ADMIN — PANTOPRAZOLE SODIUM 40 MG: 40 TABLET, DELAYED RELEASE ORAL at 22:13

## 2022-01-01 RX ADMIN — INSULIN GLARGINE 25 UNITS: 100 INJECTION, SOLUTION SUBCUTANEOUS at 08:21

## 2022-01-01 RX ADMIN — SODIUM CHLORIDE, PRESERVATIVE FREE 10 ML: 5 INJECTION INTRAVENOUS at 07:48

## 2022-01-01 RX ADMIN — GABAPENTIN 100 MG: 100 CAPSULE ORAL at 09:11

## 2022-01-01 RX ADMIN — PAROXETINE HYDROCHLORIDE 20 MG: 20 TABLET, FILM COATED ORAL at 07:49

## 2022-01-01 RX ADMIN — HYDROMORPHONE HYDROCHLORIDE 1 MG: 1 INJECTION, SOLUTION INTRAMUSCULAR; INTRAVENOUS; SUBCUTANEOUS at 13:50

## 2022-01-01 RX ADMIN — HEPARIN SODIUM 1400 UNITS/HR: 10000 INJECTION INTRAVENOUS; SUBCUTANEOUS at 06:22

## 2022-01-01 RX ADMIN — HEPARIN SODIUM 5000 UNITS: 5000 INJECTION INTRAVENOUS; SUBCUTANEOUS at 06:19

## 2022-01-01 RX ADMIN — HEPARIN SODIUM 5000 UNITS: 5000 INJECTION INTRAVENOUS; SUBCUTANEOUS at 14:31

## 2022-01-01 RX ADMIN — SODIUM CHLORIDE, PRESERVATIVE FREE 10 ML: 5 INJECTION INTRAVENOUS at 20:41

## 2022-01-01 RX ADMIN — METOCLOPRAMIDE 10 MG: 10 TABLET ORAL at 21:12

## 2022-01-01 RX ADMIN — TAMSULOSIN HYDROCHLORIDE 0.4 MG: 0.4 CAPSULE ORAL at 20:13

## 2022-01-01 RX ADMIN — HYDROMORPHONE HYDROCHLORIDE 0.5 MG: 2 TABLET ORAL at 20:52

## 2022-01-01 RX ADMIN — HEPARIN SODIUM 5000 UNITS: 5000 INJECTION INTRAVENOUS; SUBCUTANEOUS at 05:55

## 2022-01-01 RX ADMIN — INSULIN LISPRO 3 UNITS: 100 INJECTION, SOLUTION INTRAVENOUS; SUBCUTANEOUS at 07:47

## 2022-01-01 RX ADMIN — HEPARIN SODIUM: 1000 INJECTION INTRAVENOUS; SUBCUTANEOUS at 18:12

## 2022-01-01 RX ADMIN — SUCRALFATE 1 G: 1 TABLET ORAL at 16:06

## 2022-01-01 RX ADMIN — HEPARIN SODIUM 5000 UNITS: 5000 INJECTION INTRAVENOUS; SUBCUTANEOUS at 13:34

## 2022-01-01 RX ADMIN — METOCLOPRAMIDE 10 MG: 10 TABLET ORAL at 11:01

## 2022-01-01 RX ADMIN — INSULIN LISPRO 3 UNITS: 100 INJECTION, SOLUTION INTRAVENOUS; SUBCUTANEOUS at 20:43

## 2022-01-01 RX ADMIN — HEPARIN SODIUM 5000 UNITS: 5000 INJECTION INTRAVENOUS; SUBCUTANEOUS at 21:38

## 2022-01-01 RX ADMIN — MIDAZOLAM HYDROCHLORIDE 2 MG: 1 INJECTION, SOLUTION INTRAMUSCULAR; INTRAVENOUS at 10:14

## 2022-01-01 RX ADMIN — INSULIN LISPRO 2 UNITS: 100 INJECTION, SOLUTION INTRAVENOUS; SUBCUTANEOUS at 20:10

## 2022-01-01 RX ADMIN — HEPARIN SODIUM 5000 UNITS: 5000 INJECTION INTRAVENOUS; SUBCUTANEOUS at 04:36

## 2022-01-01 RX ADMIN — INSULIN LISPRO 3 UNITS: 100 INJECTION, SOLUTION INTRAVENOUS; SUBCUTANEOUS at 08:21

## 2022-01-01 RX ADMIN — Medication 1 TABLET: at 10:52

## 2022-01-01 RX ADMIN — TIZANIDINE 4 MG: 4 TABLET ORAL at 20:48

## 2022-01-01 RX ADMIN — CARVEDILOL 12.5 MG: 6.25 TABLET, FILM COATED ORAL at 16:42

## 2022-01-01 RX ADMIN — SODIUM PHOSPHATE, MONOBASIC, MONOHYDRATE 6 MMOL: 276; 142 INJECTION, SOLUTION INTRAVENOUS at 23:27

## 2022-01-01 RX ADMIN — Medication: at 11:49

## 2022-01-01 RX ADMIN — AMPICILLIN SODIUM AND SULBACTAM SODIUM 3000 MG: 2; 1 INJECTION, POWDER, FOR SOLUTION INTRAMUSCULAR; INTRAVENOUS at 04:48

## 2022-01-01 RX ADMIN — NIFEDIPINE 30 MG: 30 TABLET, FILM COATED, EXTENDED RELEASE ORAL at 09:01

## 2022-01-01 RX ADMIN — NAFCILLIN SODIUM 2000 MG: 2 INJECTION, POWDER, LYOPHILIZED, FOR SOLUTION INTRAMUSCULAR; INTRAVENOUS at 02:40

## 2022-01-01 RX ADMIN — SODIUM CHLORIDE, PRESERVATIVE FREE 10 ML: 5 INJECTION INTRAVENOUS at 08:25

## 2022-01-01 RX ADMIN — HYDROMORPHONE HYDROCHLORIDE 2 MG: 2 TABLET ORAL at 20:08

## 2022-01-01 RX ADMIN — INSULIN LISPRO 4 UNITS: 100 INJECTION, SOLUTION INTRAVENOUS; SUBCUTANEOUS at 11:49

## 2022-01-01 RX ADMIN — HYDROMORPHONE HYDROCHLORIDE 2 MG: 2 TABLET ORAL at 07:59

## 2022-01-01 RX ADMIN — FUROSEMIDE 40 MG: 40 TABLET ORAL at 05:38

## 2022-01-01 RX ADMIN — METOPROLOL SUCCINATE 50 MG: 50 TABLET, EXTENDED RELEASE ORAL at 08:24

## 2022-01-01 RX ADMIN — HEPARIN SODIUM: 1000 INJECTION INTRAVENOUS; SUBCUTANEOUS at 08:40

## 2022-01-01 RX ADMIN — DILTIAZEM HYDROCHLORIDE 30 MG: 60 TABLET, FILM COATED ORAL at 23:21

## 2022-01-01 RX ADMIN — METOPROLOL SUCCINATE 50 MG: 50 TABLET, EXTENDED RELEASE ORAL at 07:49

## 2022-01-01 RX ADMIN — INSULIN LISPRO 6 UNITS: 100 INJECTION, SOLUTION INTRAVENOUS; SUBCUTANEOUS at 16:52

## 2022-01-01 RX ADMIN — INSULIN LISPRO 3 UNITS: 100 INJECTION, SOLUTION INTRAVENOUS; SUBCUTANEOUS at 17:00

## 2022-01-01 RX ADMIN — GABAPENTIN 400 MG: 400 CAPSULE ORAL at 20:13

## 2022-01-01 RX ADMIN — HEPARIN SODIUM 5000 UNITS: 5000 INJECTION INTRAVENOUS; SUBCUTANEOUS at 05:31

## 2022-01-01 RX ADMIN — METOCLOPRAMIDE 10 MG: 10 TABLET ORAL at 21:57

## 2022-01-01 RX ADMIN — NAFCILLIN SODIUM 2000 MG: 2 INJECTION, POWDER, LYOPHILIZED, FOR SOLUTION INTRAMUSCULAR; INTRAVENOUS at 10:33

## 2022-01-01 RX ADMIN — HYDROMORPHONE HYDROCHLORIDE 2 MG: 2 TABLET ORAL at 14:54

## 2022-01-01 RX ADMIN — GABAPENTIN 100 MG: 100 CAPSULE ORAL at 14:06

## 2022-01-01 RX ADMIN — OXYCODONE 10 MG: 5 TABLET ORAL at 09:34

## 2022-01-01 RX ADMIN — INSULIN GLARGINE 25 UNITS: 100 INJECTION, SOLUTION SUBCUTANEOUS at 20:22

## 2022-01-01 RX ADMIN — QUETIAPINE FUMARATE 25 MG: 25 TABLET ORAL at 14:22

## 2022-01-01 RX ADMIN — INSULIN LISPRO 3 UNITS: 100 INJECTION, SOLUTION INTRAVENOUS; SUBCUTANEOUS at 08:30

## 2022-01-01 RX ADMIN — INSULIN GLARGINE 15 UNITS: 100 INJECTION, SOLUTION SUBCUTANEOUS at 21:31

## 2022-01-01 RX ADMIN — PROPOFOL 25 MCG/KG/MIN: 10 INJECTION, EMULSION INTRAVENOUS at 07:55

## 2022-01-01 RX ADMIN — Medication 1 TABLET: at 10:49

## 2022-01-01 RX ADMIN — INSULIN LISPRO 6 UNITS: 100 INJECTION, SOLUTION INTRAVENOUS; SUBCUTANEOUS at 12:28

## 2022-01-01 RX ADMIN — OXYCODONE 10 MG: 5 TABLET ORAL at 10:43

## 2022-01-01 RX ADMIN — HYDROMORPHONE HYDROCHLORIDE 2 MG: 2 TABLET ORAL at 14:36

## 2022-01-01 RX ADMIN — QUETIAPINE FUMARATE 25 MG: 25 TABLET ORAL at 08:28

## 2022-01-01 RX ADMIN — PAROXETINE HYDROCHLORIDE 20 MG: 20 TABLET, FILM COATED ORAL at 08:26

## 2022-01-01 RX ADMIN — PROPOFOL INJECTABLE EMULSION 25 MCG/KG/MIN: 10 INJECTION, EMULSION INTRAVENOUS at 06:45

## 2022-01-01 RX ADMIN — FLUTICASONE PROPIONATE 2 SPRAY: 50 SPRAY, METERED NASAL at 20:22

## 2022-01-01 RX ADMIN — METOPROLOL TARTRATE 25 MG: 25 TABLET, FILM COATED ORAL at 21:03

## 2022-01-01 RX ADMIN — OXYCODONE HYDROCHLORIDE AND ACETAMINOPHEN 1 TABLET: 5; 325 TABLET ORAL at 10:56

## 2022-01-01 RX ADMIN — OXYCODONE 10 MG: 5 TABLET ORAL at 09:51

## 2022-01-01 RX ADMIN — ACETAMINOPHEN 650 MG: 325 TABLET ORAL at 20:25

## 2022-01-01 RX ADMIN — SODIUM CHLORIDE, PRESERVATIVE FREE 10 ML: 5 INJECTION INTRAVENOUS at 08:03

## 2022-01-01 RX ADMIN — DOXYCYCLINE HYCLATE 100 MG: 100 TABLET, COATED ORAL at 07:58

## 2022-01-01 RX ADMIN — QUETIAPINE FUMARATE 25 MG: 25 TABLET ORAL at 10:47

## 2022-01-01 RX ADMIN — METOCLOPRAMIDE 10 MG: 10 TABLET ORAL at 20:08

## 2022-01-01 RX ADMIN — HYDROCORTISONE SODIUM SUCCINATE 50 MG: 100 INJECTION, POWDER, FOR SOLUTION INTRAMUSCULAR; INTRAVENOUS at 11:32

## 2022-01-01 RX ADMIN — GABAPENTIN 100 MG: 100 CAPSULE ORAL at 20:53

## 2022-01-01 RX ADMIN — ACETAMINOPHEN 650 MG: 325 TABLET ORAL at 18:17

## 2022-01-01 RX ADMIN — INSULIN LISPRO 3 UNITS: 100 INJECTION, SOLUTION INTRAVENOUS; SUBCUTANEOUS at 12:03

## 2022-01-01 RX ADMIN — SUCRALFATE 1 G: 1 TABLET ORAL at 05:32

## 2022-01-01 RX ADMIN — INSULIN GLARGINE 25 UNITS: 100 INJECTION, SOLUTION SUBCUTANEOUS at 21:14

## 2022-01-01 RX ADMIN — QUETIAPINE FUMARATE 25 MG: 25 TABLET ORAL at 09:38

## 2022-01-01 RX ADMIN — FLUTICASONE PROPIONATE 2 SPRAY: 50 SPRAY, METERED NASAL at 21:10

## 2022-01-01 RX ADMIN — AMPICILLIN SODIUM AND SULBACTAM SODIUM 3000 MG: 2; 1 INJECTION, POWDER, FOR SOLUTION INTRAMUSCULAR; INTRAVENOUS at 10:46

## 2022-01-01 RX ADMIN — Medication 400 MG: at 20:53

## 2022-01-01 RX ADMIN — ONDANSETRON 4 MG: 2 INJECTION INTRAMUSCULAR; INTRAVENOUS at 05:55

## 2022-01-01 RX ADMIN — HEPARIN SODIUM 5000 UNITS: 5000 INJECTION INTRAVENOUS; SUBCUTANEOUS at 05:22

## 2022-01-01 RX ADMIN — PANTOPRAZOLE SODIUM 40 MG: 40 INJECTION, POWDER, FOR SOLUTION INTRAVENOUS at 10:58

## 2022-01-01 RX ADMIN — Medication 4 MG/HR: at 17:39

## 2022-01-01 RX ADMIN — METOCLOPRAMIDE 10 MG: 10 TABLET ORAL at 09:40

## 2022-01-01 RX ADMIN — HEPARIN SODIUM 5000 UNITS: 5000 INJECTION INTRAVENOUS; SUBCUTANEOUS at 12:48

## 2022-01-01 RX ADMIN — ONDANSETRON HYDROCHLORIDE 4 MG: 2 INJECTION, SOLUTION INTRAMUSCULAR; INTRAVENOUS at 20:52

## 2022-01-01 RX ADMIN — PAROXETINE HYDROCHLORIDE 20 MG: 20 TABLET, FILM COATED ORAL at 08:41

## 2022-01-01 RX ADMIN — OXYCODONE 10 MG: 5 TABLET ORAL at 05:16

## 2022-01-01 RX ADMIN — Medication: at 20:53

## 2022-01-01 RX ADMIN — Medication 800 MG: at 09:51

## 2022-01-01 RX ADMIN — METOCLOPRAMIDE 10 MG: 10 TABLET ORAL at 20:32

## 2022-01-01 RX ADMIN — PANTOPRAZOLE SODIUM 40 MG: 40 TABLET, DELAYED RELEASE ORAL at 09:40

## 2022-01-01 RX ADMIN — INSULIN GLARGINE 25 UNITS: 100 INJECTION, SOLUTION SUBCUTANEOUS at 07:54

## 2022-01-01 RX ADMIN — Medication: at 20:10

## 2022-01-01 RX ADMIN — PAROXETINE HYDROCHLORIDE 20 MG: 20 TABLET, FILM COATED ORAL at 08:06

## 2022-01-01 RX ADMIN — OXYCODONE 5 MG: 5 TABLET ORAL at 09:41

## 2022-01-01 RX ADMIN — Medication: at 07:48

## 2022-01-01 RX ADMIN — TIZANIDINE 4 MG: 4 TABLET ORAL at 09:41

## 2022-01-01 RX ADMIN — HYDROMORPHONE HYDROCHLORIDE 1 MG: 1 INJECTION, SOLUTION INTRAMUSCULAR; INTRAVENOUS; SUBCUTANEOUS at 02:43

## 2022-01-01 RX ADMIN — ASCORBIC ACID, THIAMINE MONONITRATE,RIBOFLAVIN, NIACINAMIDE, PYRIDOXINE HYDROCHLORIDE, FOLIC ACID, CYANOCOBALAMIN, BIOTIN, CALCIUM PANTOTHENATE, 1 MG: 100; 1.5; 1.7; 20; 10; 1; 6000; 150000; 5 CAPSULE, LIQUID FILLED ORAL at 09:50

## 2022-01-01 RX ADMIN — INSULIN GLARGINE 15 UNITS: 100 INJECTION, SOLUTION SUBCUTANEOUS at 20:38

## 2022-01-01 RX ADMIN — INSULIN GLARGINE 25 UNITS: 100 INJECTION, SOLUTION SUBCUTANEOUS at 21:33

## 2022-01-01 RX ADMIN — ACETAMINOPHEN 650 MG: 325 TABLET ORAL at 05:13

## 2022-01-01 RX ADMIN — INSULIN LISPRO 3 UNITS: 100 INJECTION, SOLUTION INTRAVENOUS; SUBCUTANEOUS at 17:18

## 2022-01-01 RX ADMIN — MIDAZOLAM HYDROCHLORIDE 2 MG: 2 INJECTION, SOLUTION INTRAMUSCULAR; INTRAVENOUS at 01:14

## 2022-01-01 RX ADMIN — DILTIAZEM HYDROCHLORIDE 30 MG: 60 TABLET, FILM COATED ORAL at 00:32

## 2022-01-01 RX ADMIN — METOPROLOL TARTRATE 25 MG: 25 TABLET, FILM COATED ORAL at 19:57

## 2022-01-01 RX ADMIN — DILTIAZEM HYDROCHLORIDE 30 MG: 60 TABLET, FILM COATED ORAL at 17:47

## 2022-01-01 RX ADMIN — SODIUM CHLORIDE 250 ML: 9 INJECTION, SOLUTION INTRAVENOUS at 20:11

## 2022-01-01 RX ADMIN — HEPARIN SODIUM 2600 UNITS: 1000 INJECTION INTRAVENOUS; SUBCUTANEOUS at 16:51

## 2022-01-01 RX ADMIN — SODIUM CHLORIDE 0.6 UNITS/HR: 9 INJECTION, SOLUTION INTRAVENOUS at 16:32

## 2022-01-01 RX ADMIN — INSULIN LISPRO 3 UNITS: 100 INJECTION, SOLUTION INTRAVENOUS; SUBCUTANEOUS at 20:20

## 2022-01-01 RX ADMIN — PANTOPRAZOLE SODIUM 40 MG: 40 TABLET, DELAYED RELEASE ORAL at 10:00

## 2022-01-01 RX ADMIN — PANTOPRAZOLE SODIUM 40 MG: 40 INJECTION, POWDER, FOR SOLUTION INTRAVENOUS at 09:07

## 2022-01-01 RX ADMIN — ONDANSETRON HYDROCHLORIDE 4 MG: 2 INJECTION, SOLUTION INTRAMUSCULAR; INTRAVENOUS at 00:24

## 2022-01-01 RX ADMIN — INSULIN LISPRO 6 UNITS: 100 INJECTION, SOLUTION INTRAVENOUS; SUBCUTANEOUS at 17:59

## 2022-01-01 RX ADMIN — NAFCILLIN SODIUM 2000 MG: 2 INJECTION, POWDER, LYOPHILIZED, FOR SOLUTION INTRAMUSCULAR; INTRAVENOUS at 00:22

## 2022-01-01 RX ADMIN — INSULIN LISPRO 3 UNITS: 100 INJECTION, SOLUTION INTRAVENOUS; SUBCUTANEOUS at 06:09

## 2022-01-01 RX ADMIN — INSULIN LISPRO 3 UNITS: 100 INJECTION, SOLUTION INTRAVENOUS; SUBCUTANEOUS at 19:36

## 2022-01-01 RX ADMIN — NAFCILLIN SODIUM 2000 MG: 2 INJECTION, POWDER, LYOPHILIZED, FOR SOLUTION INTRAMUSCULAR; INTRAVENOUS at 15:27

## 2022-01-01 RX ADMIN — OXYCODONE 10 MG: 5 TABLET ORAL at 20:33

## 2022-01-01 RX ADMIN — Medication 2 MG/HR: at 17:01

## 2022-01-01 RX ADMIN — HEPARIN SODIUM 5000 UNITS: 5000 INJECTION INTRAVENOUS; SUBCUTANEOUS at 05:53

## 2022-01-01 RX ADMIN — LISINOPRIL 5 MG: 5 TABLET ORAL at 07:57

## 2022-01-01 RX ADMIN — METOPROLOL TARTRATE 25 MG: 25 TABLET, FILM COATED ORAL at 20:49

## 2022-01-01 RX ADMIN — PANTOPRAZOLE SODIUM 40 MG: 40 TABLET, DELAYED RELEASE ORAL at 06:34

## 2022-01-01 RX ADMIN — INSULIN LISPRO 6 UNITS: 100 INJECTION, SOLUTION INTRAVENOUS; SUBCUTANEOUS at 16:44

## 2022-01-01 RX ADMIN — IOHEXOL 50 ML: 240 INJECTION, SOLUTION INTRATHECAL; INTRAVASCULAR; INTRAVENOUS; ORAL at 10:49

## 2022-01-01 RX ADMIN — OXYCODONE HYDROCHLORIDE AND ACETAMINOPHEN 1 TABLET: 5; 325 TABLET ORAL at 05:48

## 2022-01-01 RX ADMIN — FLUCONAZOLE 100 MG: 100 TABLET ORAL at 09:07

## 2022-01-01 RX ADMIN — OXYCODONE 10 MG: 5 TABLET ORAL at 16:54

## 2022-01-01 RX ADMIN — METOPROLOL SUCCINATE 25 MG: 25 TABLET, EXTENDED RELEASE ORAL at 21:02

## 2022-01-01 RX ADMIN — CHLORHEXIDINE GLUCONATE 0.12% ORAL RINSE 15 ML: 1.2 LIQUID ORAL at 02:31

## 2022-01-01 RX ADMIN — OXYCODONE 10 MG: 5 TABLET ORAL at 05:38

## 2022-01-01 RX ADMIN — INSULIN LISPRO 3 UNITS: 100 INJECTION, SOLUTION INTRAVENOUS; SUBCUTANEOUS at 23:12

## 2022-01-01 RX ADMIN — TIZANIDINE 8 MG: 4 TABLET ORAL at 08:50

## 2022-01-01 RX ADMIN — SODIUM CHLORIDE, PRESERVATIVE FREE 10 ML: 5 INJECTION INTRAVENOUS at 19:43

## 2022-01-01 RX ADMIN — SODIUM CHLORIDE, PRESERVATIVE FREE 10 ML: 5 INJECTION INTRAVENOUS at 09:29

## 2022-01-01 RX ADMIN — HYDROMORPHONE HYDROCHLORIDE 1 MG: 1 INJECTION, SOLUTION INTRAMUSCULAR; INTRAVENOUS; SUBCUTANEOUS at 06:35

## 2022-01-01 RX ADMIN — ONDANSETRON 4 MG: 2 INJECTION INTRAMUSCULAR; INTRAVENOUS at 20:16

## 2022-01-01 RX ADMIN — OXYCODONE 10 MG: 5 TABLET ORAL at 02:45

## 2022-01-01 RX ADMIN — METOPROLOL SUCCINATE 25 MG: 25 TABLET, EXTENDED RELEASE ORAL at 09:31

## 2022-01-01 RX ADMIN — Medication 800 MG: at 20:11

## 2022-01-01 RX ADMIN — PANTOPRAZOLE SODIUM 40 MG: 40 INJECTION, POWDER, FOR SOLUTION INTRAVENOUS at 19:43

## 2022-01-01 RX ADMIN — Medication 2 MCG/KG/HR: at 00:43

## 2022-01-01 RX ADMIN — SENNOSIDES 17.2 MG: 8.6 TABLET, COATED ORAL at 20:18

## 2022-01-01 RX ADMIN — INSULIN LISPRO 2 UNITS: 100 INJECTION, SOLUTION INTRAVENOUS; SUBCUTANEOUS at 21:53

## 2022-01-01 RX ADMIN — OXYCODONE 10 MG: 5 TABLET ORAL at 12:11

## 2022-01-01 RX ADMIN — HEPARIN SODIUM 5000 UNITS: 5000 INJECTION INTRAVENOUS; SUBCUTANEOUS at 15:21

## 2022-01-01 RX ADMIN — HYDROCORTISONE SODIUM SUCCINATE 50 MG: 100 INJECTION, POWDER, FOR SOLUTION INTRAMUSCULAR; INTRAVENOUS at 14:11

## 2022-01-01 RX ADMIN — SUCRALFATE 1 G: 1 TABLET ORAL at 05:05

## 2022-01-01 RX ADMIN — FUROSEMIDE 40 MG: 40 TABLET ORAL at 16:52

## 2022-01-01 RX ADMIN — Medication 2 MCG/KG/HR: at 16:28

## 2022-01-01 RX ADMIN — Medication 2 MCG/KG/HR: at 05:47

## 2022-01-01 RX ADMIN — HYDROMORPHONE HYDROCHLORIDE 1 MG: 1 INJECTION, SOLUTION INTRAMUSCULAR; INTRAVENOUS; SUBCUTANEOUS at 20:25

## 2022-01-01 RX ADMIN — CARVEDILOL 12.5 MG: 6.25 TABLET, FILM COATED ORAL at 08:39

## 2022-01-01 RX ADMIN — Medication: at 08:10

## 2022-01-01 RX ADMIN — GABAPENTIN 100 MG: 100 CAPSULE ORAL at 15:14

## 2022-01-01 RX ADMIN — PAROXETINE HYDROCHLORIDE 20 MG: 20 TABLET, FILM COATED ORAL at 08:35

## 2022-01-01 RX ADMIN — SODIUM CHLORIDE: 9 INJECTION, SOLUTION INTRAVENOUS at 12:09

## 2022-01-01 RX ADMIN — SODIUM CHLORIDE, PRESERVATIVE FREE 10 ML: 5 INJECTION INTRAVENOUS at 20:54

## 2022-01-01 RX ADMIN — Medication 0.6 MCG/KG/HR: at 20:00

## 2022-01-01 RX ADMIN — AMPICILLIN SODIUM AND SULBACTAM SODIUM 3000 MG: 2; 1 INJECTION, POWDER, FOR SOLUTION INTRAMUSCULAR; INTRAVENOUS at 10:42

## 2022-01-01 RX ADMIN — OXYCODONE 10 MG: 5 TABLET ORAL at 06:10

## 2022-01-01 RX ADMIN — Medication: at 21:14

## 2022-01-01 RX ADMIN — SODIUM CHLORIDE: 9 INJECTION, SOLUTION INTRAVENOUS at 10:44

## 2022-01-01 RX ADMIN — NAFCILLIN SODIUM 2000 MG: 2 INJECTION, POWDER, LYOPHILIZED, FOR SOLUTION INTRAMUSCULAR; INTRAVENOUS at 06:56

## 2022-01-01 RX ADMIN — SUCRALFATE 1 G: 1 TABLET ORAL at 10:28

## 2022-01-01 RX ADMIN — INSULIN LISPRO 1 UNITS: 100 INJECTION, SOLUTION INTRAVENOUS; SUBCUTANEOUS at 02:10

## 2022-01-01 RX ADMIN — SUCRALFATE 1 G: 1 TABLET ORAL at 12:50

## 2022-01-01 RX ADMIN — PROPOFOL 10 MCG/KG/MIN: 10 INJECTION, EMULSION INTRAVENOUS at 04:50

## 2022-01-01 RX ADMIN — ONDANSETRON 4 MG: 4 TABLET, ORALLY DISINTEGRATING ORAL at 05:31

## 2022-01-01 RX ADMIN — INSULIN LISPRO 3 UNITS: 100 INJECTION, SOLUTION INTRAVENOUS; SUBCUTANEOUS at 16:44

## 2022-01-01 RX ADMIN — METOPROLOL TARTRATE 5 MG: 5 INJECTION INTRAVENOUS at 10:32

## 2022-01-01 RX ADMIN — INSULIN GLARGINE 17 UNITS: 100 INJECTION, SOLUTION SUBCUTANEOUS at 21:04

## 2022-01-01 RX ADMIN — Medication: at 11:59

## 2022-01-01 RX ADMIN — METOPROLOL TARTRATE 25 MG: 25 TABLET, FILM COATED ORAL at 20:52

## 2022-01-01 RX ADMIN — OXYCODONE 10 MG: 5 TABLET ORAL at 14:31

## 2022-01-01 RX ADMIN — DILTIAZEM HYDROCHLORIDE 30 MG: 60 TABLET, FILM COATED ORAL at 05:05

## 2022-01-01 RX ADMIN — METOCLOPRAMIDE 10 MG: 10 TABLET ORAL at 20:58

## 2022-01-01 RX ADMIN — PAROXETINE HYDROCHLORIDE 20 MG: 20 TABLET, FILM COATED ORAL at 09:01

## 2022-01-01 RX ADMIN — INSULIN LISPRO 3 UNITS: 100 INJECTION, SOLUTION INTRAVENOUS; SUBCUTANEOUS at 17:30

## 2022-01-01 RX ADMIN — OXYCODONE HYDROCHLORIDE AND ACETAMINOPHEN 1 TABLET: 5; 325 TABLET ORAL at 12:44

## 2022-01-01 RX ADMIN — PANTOPRAZOLE SODIUM 40 MG: 40 TABLET, DELAYED RELEASE ORAL at 08:45

## 2022-01-01 RX ADMIN — FLUTICASONE PROPIONATE 2 SPRAY: 50 SPRAY, METERED NASAL at 20:14

## 2022-01-01 RX ADMIN — QUETIAPINE FUMARATE 12.5 MG: 25 TABLET ORAL at 16:40

## 2022-01-01 RX ADMIN — PAROXETINE HYDROCHLORIDE 20 MG: 20 TABLET, FILM COATED ORAL at 09:00

## 2022-01-01 RX ADMIN — QUETIAPINE FUMARATE 25 MG: 25 TABLET ORAL at 16:39

## 2022-01-01 RX ADMIN — PANTOPRAZOLE SODIUM 40 MG: 40 INJECTION, POWDER, FOR SOLUTION INTRAVENOUS at 22:02

## 2022-01-01 RX ADMIN — ONDANSETRON HYDROCHLORIDE 4 MG: 2 INJECTION, SOLUTION INTRAMUSCULAR; INTRAVENOUS at 18:58

## 2022-01-01 RX ADMIN — Medication 2 MCG/KG/HR: at 07:26

## 2022-01-01 RX ADMIN — OXYCODONE 10 MG: 5 TABLET ORAL at 17:28

## 2022-01-01 RX ADMIN — HYDROMORPHONE HYDROCHLORIDE 1 MG: 1 INJECTION, SOLUTION INTRAMUSCULAR; INTRAVENOUS; SUBCUTANEOUS at 18:06

## 2022-01-01 RX ADMIN — INSULIN LISPRO 3 UNITS: 100 INJECTION, SOLUTION INTRAVENOUS; SUBCUTANEOUS at 23:16

## 2022-01-01 RX ADMIN — OXYCODONE 10 MG: 5 TABLET ORAL at 16:59

## 2022-01-01 RX ADMIN — TIZANIDINE 4 MG: 4 TABLET ORAL at 12:50

## 2022-01-01 RX ADMIN — HYDROMORPHONE HYDROCHLORIDE 1 MG: 1 INJECTION, SOLUTION INTRAMUSCULAR; INTRAVENOUS; SUBCUTANEOUS at 10:41

## 2022-01-01 RX ADMIN — AMPICILLIN SODIUM AND SULBACTAM SODIUM 3000 MG: 2; 1 INJECTION, POWDER, FOR SOLUTION INTRAMUSCULAR; INTRAVENOUS at 00:36

## 2022-01-01 RX ADMIN — PANTOPRAZOLE SODIUM 40 MG: 40 INJECTION, POWDER, FOR SOLUTION INTRAVENOUS at 09:51

## 2022-01-01 RX ADMIN — DOXYCYCLINE HYCLATE 100 MG: 100 TABLET, COATED ORAL at 21:09

## 2022-01-01 RX ADMIN — HEPARIN SODIUM 1200 UNITS/HR: 10000 INJECTION INTRAVENOUS; SUBCUTANEOUS at 16:36

## 2022-01-01 RX ADMIN — METOCLOPRAMIDE 10 MG: 10 TABLET ORAL at 10:12

## 2022-01-01 RX ADMIN — HYDROCORTISONE SODIUM SUCCINATE 50 MG: 100 INJECTION, POWDER, FOR SOLUTION INTRAMUSCULAR; INTRAVENOUS at 15:03

## 2022-01-01 RX ADMIN — METOPROLOL TARTRATE 5 MG: 5 INJECTION INTRAVENOUS at 23:55

## 2022-01-01 RX ADMIN — PROCHLORPERAZINE MALEATE 5 MG: 5 TABLET ORAL at 14:00

## 2022-01-01 RX ADMIN — HEPARIN SODIUM 5000 UNITS: 5000 INJECTION INTRAVENOUS; SUBCUTANEOUS at 05:01

## 2022-01-01 RX ADMIN — HYDROMORPHONE HYDROCHLORIDE 2 MG: 2 TABLET ORAL at 08:58

## 2022-01-01 RX ADMIN — TRAZODONE HYDROCHLORIDE 50 MG: 50 TABLET ORAL at 02:10

## 2022-01-01 RX ADMIN — HEPARIN SODIUM 5000 UNITS: 5000 INJECTION INTRAVENOUS; SUBCUTANEOUS at 05:39

## 2022-01-01 RX ADMIN — NOREPINEPHRINE BITARTRATE 12 MCG/MIN: 1 INJECTION, SOLUTION, CONCENTRATE INTRAVENOUS at 04:28

## 2022-01-01 RX ADMIN — INSULIN LISPRO 6 UNITS: 100 INJECTION, SOLUTION INTRAVENOUS; SUBCUTANEOUS at 09:23

## 2022-01-01 RX ADMIN — CARVEDILOL 12.5 MG: 6.25 TABLET, FILM COATED ORAL at 09:04

## 2022-01-01 RX ADMIN — PROPOFOL 25 MCG/KG/MIN: 10 INJECTION, EMULSION INTRAVENOUS at 03:36

## 2022-01-01 RX ADMIN — FENTANYL CITRATE 50 MCG: 50 INJECTION INTRAMUSCULAR; INTRAVENOUS at 10:53

## 2022-01-01 RX ADMIN — HEPARIN SODIUM 5000 UNITS: 5000 INJECTION INTRAVENOUS; SUBCUTANEOUS at 21:48

## 2022-01-01 RX ADMIN — FAMOTIDINE 20 MG: 10 INJECTION, SOLUTION INTRAVENOUS at 08:36

## 2022-01-01 RX ADMIN — METOPROLOL TARTRATE 12.5 MG: 25 TABLET, FILM COATED ORAL at 09:10

## 2022-01-01 RX ADMIN — EPINEPHRINE 1 MG: 0.1 INJECTION, SOLUTION ENDOTRACHEAL; INTRACARDIAC; INTRAVENOUS at 02:54

## 2022-01-01 RX ADMIN — OXYCODONE 5 MG: 5 TABLET ORAL at 10:46

## 2022-01-01 RX ADMIN — INSULIN LISPRO 3 UNITS: 100 INJECTION, SOLUTION INTRAVENOUS; SUBCUTANEOUS at 15:58

## 2022-01-01 RX ADMIN — METOPROLOL TARTRATE 5 MG: 5 INJECTION INTRAVENOUS at 17:39

## 2022-01-01 RX ADMIN — Medication: at 20:06

## 2022-01-01 RX ADMIN — ACETAMINOPHEN 650 MG: 325 TABLET ORAL at 05:10

## 2022-01-01 RX ADMIN — PANTOPRAZOLE SODIUM 40 MG: 40 TABLET, DELAYED RELEASE ORAL at 20:06

## 2022-01-01 RX ADMIN — LABETALOL HYDROCHLORIDE 20 MG: 5 INJECTION, SOLUTION INTRAVENOUS at 22:58

## 2022-01-01 RX ADMIN — PANTOPRAZOLE SODIUM 40 MG: 40 TABLET, DELAYED RELEASE ORAL at 16:06

## 2022-01-01 RX ADMIN — Medication 800 MG: at 08:53

## 2022-01-01 RX ADMIN — NAFCILLIN SODIUM 2000 MG: 2 INJECTION, POWDER, LYOPHILIZED, FOR SOLUTION INTRAMUSCULAR; INTRAVENOUS at 19:29

## 2022-01-01 RX ADMIN — TIZANIDINE 4 MG: 4 TABLET ORAL at 22:13

## 2022-01-01 RX ADMIN — SODIUM CHLORIDE 23.89 UNITS/HR: 9 INJECTION, SOLUTION INTRAVENOUS at 16:07

## 2022-01-01 RX ADMIN — SODIUM CHLORIDE 6.84 UNITS/HR: 9 INJECTION, SOLUTION INTRAVENOUS at 01:10

## 2022-01-01 RX ADMIN — SODIUM CHLORIDE, PRESERVATIVE FREE 10 ML: 5 INJECTION INTRAVENOUS at 20:25

## 2022-01-01 RX ADMIN — ROCURONIUM BROMIDE 20 MG: 10 INJECTION, SOLUTION INTRAVENOUS at 08:08

## 2022-01-01 RX ADMIN — ASCORBIC ACID, THIAMINE MONONITRATE,RIBOFLAVIN, NIACINAMIDE, PYRIDOXINE HYDROCHLORIDE, FOLIC ACID, CYANOCOBALAMIN, BIOTIN, CALCIUM PANTOTHENATE, 1 MG: 100; 1.5; 1.7; 20; 10; 1; 6000; 150000; 5 CAPSULE, LIQUID FILLED ORAL at 08:09

## 2022-01-01 RX ADMIN — INSULIN LISPRO 3 UNITS: 100 INJECTION, SOLUTION INTRAVENOUS; SUBCUTANEOUS at 00:28

## 2022-01-01 RX ADMIN — FENTANYL CITRATE 25 MCG: 50 INJECTION INTRAMUSCULAR; INTRAVENOUS at 12:51

## 2022-01-01 RX ADMIN — MIDAZOLAM HYDROCHLORIDE 2 MG: 2 INJECTION, SOLUTION INTRAMUSCULAR; INTRAVENOUS at 04:12

## 2022-01-01 RX ADMIN — MIDAZOLAM HYDROCHLORIDE 2 MG: 1 INJECTION INTRAMUSCULAR; INTRAVENOUS at 05:10

## 2022-01-01 RX ADMIN — TAMSULOSIN HYDROCHLORIDE 0.4 MG: 0.4 CAPSULE ORAL at 21:09

## 2022-01-01 RX ADMIN — PROPOFOL INJECTABLE EMULSION 10 MCG/KG/MIN: 10 INJECTION, EMULSION INTRAVENOUS at 04:50

## 2022-01-01 RX ADMIN — OXYCODONE 10 MG: 5 TABLET ORAL at 10:08

## 2022-01-01 RX ADMIN — DILTIAZEM HYDROCHLORIDE 30 MG: 60 TABLET, FILM COATED ORAL at 05:45

## 2022-01-01 RX ADMIN — Medication: at 08:28

## 2022-01-01 RX ADMIN — HYDROMORPHONE HYDROCHLORIDE 2 MG: 2 TABLET ORAL at 20:13

## 2022-01-01 RX ADMIN — INSULIN GLARGINE 10 UNITS: 100 INJECTION, SOLUTION SUBCUTANEOUS at 20:51

## 2022-01-01 RX ADMIN — Medication: at 09:46

## 2022-01-01 RX ADMIN — Medication 800 MG: at 20:49

## 2022-01-01 RX ADMIN — FUROSEMIDE 40 MG: 40 TABLET ORAL at 06:18

## 2022-01-01 RX ADMIN — HEPARIN SODIUM 5000 UNITS: 5000 INJECTION INTRAVENOUS; SUBCUTANEOUS at 06:42

## 2022-01-01 RX ADMIN — PANTOPRAZOLE SODIUM 40 MG: 40 INJECTION, POWDER, FOR SOLUTION INTRAVENOUS at 07:48

## 2022-01-01 RX ADMIN — OXYCODONE 5 MG: 5 TABLET ORAL at 01:11

## 2022-01-01 RX ADMIN — SUCRALFATE 1 G: 1 TABLET ORAL at 06:04

## 2022-01-01 RX ADMIN — FENTANYL CITRATE 50 MCG: 50 INJECTION INTRAMUSCULAR; INTRAVENOUS at 15:55

## 2022-01-01 RX ADMIN — ONDANSETRON HYDROCHLORIDE 4 MG: 2 INJECTION, SOLUTION INTRAMUSCULAR; INTRAVENOUS at 08:20

## 2022-01-01 RX ADMIN — TAMSULOSIN HYDROCHLORIDE 0.4 MG: 0.4 CAPSULE ORAL at 20:01

## 2022-01-01 RX ADMIN — SUCRALFATE 1 G: 1 TABLET ORAL at 06:54

## 2022-01-01 RX ADMIN — DILTIAZEM HYDROCHLORIDE 30 MG: 60 TABLET, FILM COATED ORAL at 05:23

## 2022-01-01 RX ADMIN — METOCLOPRAMIDE 10 MG: 10 TABLET ORAL at 20:50

## 2022-01-01 RX ADMIN — DILTIAZEM HYDROCHLORIDE 30 MG: 60 TABLET, FILM COATED ORAL at 00:04

## 2022-01-01 RX ADMIN — TIZANIDINE 8 MG: 4 TABLET ORAL at 07:56

## 2022-01-01 RX ADMIN — AMPICILLIN SODIUM AND SULBACTAM SODIUM 3000 MG: 2; 1 INJECTION, POWDER, FOR SOLUTION INTRAMUSCULAR; INTRAVENOUS at 21:47

## 2022-01-01 RX ADMIN — INSULIN LISPRO 3 UNITS: 100 INJECTION, SOLUTION INTRAVENOUS; SUBCUTANEOUS at 11:23

## 2022-01-01 RX ADMIN — OXYCODONE 5 MG: 5 TABLET ORAL at 16:12

## 2022-01-01 RX ADMIN — LINEZOLID 600 MG: 600 INJECTION, SOLUTION INTRAVENOUS at 00:26

## 2022-01-01 RX ADMIN — SODIUM CHLORIDE 8.34 UNITS/HR: 9 INJECTION, SOLUTION INTRAVENOUS at 11:55

## 2022-01-01 RX ADMIN — NAFCILLIN SODIUM 2000 MG: 2 INJECTION, POWDER, LYOPHILIZED, FOR SOLUTION INTRAMUSCULAR; INTRAVENOUS at 06:35

## 2022-01-01 RX ADMIN — OXYCODONE HYDROCHLORIDE AND ACETAMINOPHEN 1 TABLET: 5; 325 TABLET ORAL at 08:11

## 2022-01-01 RX ADMIN — QUETIAPINE FUMARATE 25 MG: 25 TABLET ORAL at 16:46

## 2022-01-01 RX ADMIN — Medication: at 08:15

## 2022-01-01 RX ADMIN — ACETAMINOPHEN 650 MG: 325 TABLET ORAL at 16:38

## 2022-01-01 RX ADMIN — NIFEDIPINE 30 MG: 30 TABLET, FILM COATED, EXTENDED RELEASE ORAL at 09:58

## 2022-01-01 RX ADMIN — ONDANSETRON 4 MG: 2 INJECTION INTRAMUSCULAR; INTRAVENOUS at 08:27

## 2022-01-01 RX ADMIN — MIDAZOLAM HYDROCHLORIDE 2 MG: 2 INJECTION, SOLUTION INTRAMUSCULAR; INTRAVENOUS at 14:52

## 2022-01-01 RX ADMIN — HEPARIN SODIUM 5000 UNITS: 5000 INJECTION INTRAVENOUS; SUBCUTANEOUS at 06:52

## 2022-01-01 RX ADMIN — INSULIN LISPRO 6 UNITS: 100 INJECTION, SOLUTION INTRAVENOUS; SUBCUTANEOUS at 23:23

## 2022-01-01 RX ADMIN — PAROXETINE HYDROCHLORIDE 20 MG: 20 TABLET, FILM COATED ORAL at 08:50

## 2022-01-01 RX ADMIN — ALBUMIN (HUMAN) 12.5 G: 0.25 INJECTION, SOLUTION INTRAVENOUS at 08:10

## 2022-01-01 RX ADMIN — Medication 1 TABLET: at 12:47

## 2022-01-01 RX ADMIN — INSULIN LISPRO 3 UNITS: 100 INJECTION, SOLUTION INTRAVENOUS; SUBCUTANEOUS at 00:31

## 2022-01-01 RX ADMIN — OXYCODONE HYDROCHLORIDE AND ACETAMINOPHEN 1 TABLET: 5; 325 TABLET ORAL at 16:30

## 2022-01-01 RX ADMIN — Medication: at 12:39

## 2022-01-01 RX ADMIN — Medication 800 MG: at 14:50

## 2022-01-01 RX ADMIN — METOCLOPRAMIDE 10 MG: 10 TABLET ORAL at 09:28

## 2022-01-01 RX ADMIN — HEPARIN SODIUM 5000 UNITS: 5000 INJECTION INTRAVENOUS; SUBCUTANEOUS at 13:26

## 2022-01-01 RX ADMIN — INSULIN LISPRO 3 UNITS: 100 INJECTION, SOLUTION INTRAVENOUS; SUBCUTANEOUS at 20:15

## 2022-01-01 RX ADMIN — Medication 1 TABLET: at 11:53

## 2022-01-01 RX ADMIN — MIDAZOLAM HYDROCHLORIDE 2 MG: 2 INJECTION, SOLUTION INTRAMUSCULAR; INTRAVENOUS at 12:31

## 2022-01-01 RX ADMIN — INSULIN LISPRO 9 UNITS: 100 INJECTION, SOLUTION INTRAVENOUS; SUBCUTANEOUS at 16:18

## 2022-01-01 RX ADMIN — SUCRALFATE 1 G: 1 TABLET ORAL at 20:40

## 2022-01-01 RX ADMIN — HYDROMORPHONE HYDROCHLORIDE 2 MG: 2 TABLET ORAL at 20:11

## 2022-01-01 RX ADMIN — OXYCODONE HYDROCHLORIDE AND ACETAMINOPHEN 1 TABLET: 5; 325 TABLET ORAL at 20:53

## 2022-01-01 RX ADMIN — Medication 800 MG: at 08:48

## 2022-01-01 RX ADMIN — OXYCODONE 10 MG: 5 TABLET ORAL at 05:25

## 2022-01-01 RX ADMIN — MIDAZOLAM HYDROCHLORIDE 2 MG: 2 INJECTION, SOLUTION INTRAMUSCULAR; INTRAVENOUS at 14:11

## 2022-01-01 RX ADMIN — HYDROMORPHONE HYDROCHLORIDE 1 MG: 2 TABLET ORAL at 20:48

## 2022-01-01 RX ADMIN — HEPARIN SODIUM 5000 UNITS: 5000 INJECTION INTRAVENOUS; SUBCUTANEOUS at 13:59

## 2022-01-01 RX ADMIN — Medication: at 20:38

## 2022-01-01 RX ADMIN — SODIUM CHLORIDE, PRESERVATIVE FREE 10 ML: 5 INJECTION INTRAVENOUS at 10:00

## 2022-01-01 RX ADMIN — HEPARIN SODIUM 1300 UNITS: 1000 INJECTION INTRAVENOUS; SUBCUTANEOUS at 17:30

## 2022-01-01 RX ADMIN — FUROSEMIDE 40 MG: 40 TABLET ORAL at 16:34

## 2022-01-01 RX ADMIN — OXYCODONE 10 MG: 5 TABLET ORAL at 01:46

## 2022-01-01 RX ADMIN — MIDAZOLAM HYDROCHLORIDE 2 MG: 1 INJECTION, SOLUTION INTRAMUSCULAR; INTRAVENOUS at 00:17

## 2022-01-01 RX ADMIN — FLUCONAZOLE 100 MG: 100 TABLET ORAL at 09:42

## 2022-01-01 RX ADMIN — HEPARIN SODIUM 5000 UNITS: 5000 INJECTION INTRAVENOUS; SUBCUTANEOUS at 06:04

## 2022-01-01 RX ADMIN — HEPARIN SODIUM 3000 UNITS: 1000 INJECTION INTRAVENOUS; SUBCUTANEOUS at 11:07

## 2022-01-01 RX ADMIN — OXYCODONE 10 MG: 5 TABLET ORAL at 02:18

## 2022-01-01 RX ADMIN — OXYCODONE 10 MG: 5 TABLET ORAL at 11:16

## 2022-01-01 RX ADMIN — MIDAZOLAM HYDROCHLORIDE 2 MG: 2 INJECTION, SOLUTION INTRAMUSCULAR; INTRAVENOUS at 20:40

## 2022-01-01 RX ADMIN — HYDROMORPHONE HYDROCHLORIDE 1 MG: 1 INJECTION, SOLUTION INTRAMUSCULAR; INTRAVENOUS; SUBCUTANEOUS at 12:27

## 2022-01-01 RX ADMIN — ONDANSETRON HYDROCHLORIDE 4 MG: 2 INJECTION, SOLUTION INTRAMUSCULAR; INTRAVENOUS at 00:33

## 2022-01-01 RX ADMIN — OXYCODONE HYDROCHLORIDE AND ACETAMINOPHEN 1 TABLET: 5; 325 TABLET ORAL at 16:13

## 2022-01-01 RX ADMIN — PANTOPRAZOLE SODIUM 40 MG: 40 INJECTION, POWDER, FOR SOLUTION INTRAVENOUS at 10:45

## 2022-01-01 RX ADMIN — NIFEDIPINE 30 MG: 30 TABLET, FILM COATED, EXTENDED RELEASE ORAL at 20:43

## 2022-01-01 RX ADMIN — INSULIN LISPRO 3 UNITS: 100 INJECTION, SOLUTION INTRAVENOUS; SUBCUTANEOUS at 11:38

## 2022-01-01 RX ADMIN — TRAZODONE HYDROCHLORIDE 100 MG: 50 TABLET ORAL at 20:17

## 2022-01-01 RX ADMIN — INSULIN GLARGINE 15 UNITS: 100 INJECTION, SOLUTION SUBCUTANEOUS at 20:51

## 2022-01-01 RX ADMIN — TIZANIDINE 8 MG: 4 TABLET ORAL at 08:47

## 2022-01-01 RX ADMIN — INSULIN LISPRO 6 UNITS: 100 INJECTION, SOLUTION INTRAVENOUS; SUBCUTANEOUS at 06:02

## 2022-01-01 RX ADMIN — Medication 2 MCG/KG/HR: at 23:00

## 2022-01-01 RX ADMIN — SODIUM CHLORIDE, PRESERVATIVE FREE 10 ML: 5 INJECTION INTRAVENOUS at 09:00

## 2022-01-01 RX ADMIN — PROMETHAZINE HYDROCHLORIDE 12.5 MG: 25 TABLET ORAL at 08:13

## 2022-01-01 RX ADMIN — SODIUM CHLORIDE, PRESERVATIVE FREE 30 ML: 5 INJECTION INTRAVENOUS at 09:00

## 2022-01-01 RX ADMIN — PANTOPRAZOLE SODIUM 40 MG: 40 TABLET, DELAYED RELEASE ORAL at 20:52

## 2022-01-01 RX ADMIN — METOPROLOL SUCCINATE 50 MG: 50 TABLET, EXTENDED RELEASE ORAL at 09:58

## 2022-01-01 RX ADMIN — Medication 0.2 MCG/KG/HR: at 09:14

## 2022-01-01 RX ADMIN — NAFCILLIN SODIUM 2000 MG: 2 INJECTION, POWDER, LYOPHILIZED, FOR SOLUTION INTRAMUSCULAR; INTRAVENOUS at 06:29

## 2022-01-01 RX ADMIN — SODIUM CHLORIDE 950 ML: 9 INJECTION, SOLUTION INTRAVENOUS at 23:01

## 2022-01-01 RX ADMIN — PANTOPRAZOLE SODIUM 40 MG: 40 TABLET, DELAYED RELEASE ORAL at 21:20

## 2022-01-01 RX ADMIN — Medication 2 MCG/KG/HR: at 21:33

## 2022-01-01 RX ADMIN — HYDROMORPHONE HYDROCHLORIDE 0.5 MG: 2 TABLET ORAL at 20:13

## 2022-01-01 RX ADMIN — TRAZODONE HYDROCHLORIDE 50 MG: 50 TABLET ORAL at 20:19

## 2022-01-01 RX ADMIN — METOCLOPRAMIDE 10 MG: 10 TABLET ORAL at 12:02

## 2022-01-01 RX ADMIN — INSULIN GLARGINE 17 UNITS: 100 INJECTION, SOLUTION SUBCUTANEOUS at 20:02

## 2022-01-01 RX ADMIN — PROCHLORPERAZINE MALEATE 5 MG: 5 TABLET ORAL at 12:51

## 2022-01-01 RX ADMIN — INSULIN LISPRO 3 UNITS: 100 INJECTION, SOLUTION INTRAVENOUS; SUBCUTANEOUS at 04:27

## 2022-01-01 RX ADMIN — FLUTICASONE PROPIONATE 2 SPRAY: 50 SPRAY, METERED NASAL at 21:38

## 2022-01-01 RX ADMIN — QUETIAPINE FUMARATE 25 MG: 25 TABLET ORAL at 08:24

## 2022-01-01 RX ADMIN — CHLORHEXIDINE GLUCONATE 0.12% ORAL RINSE 15 ML: 1.2 LIQUID ORAL at 07:55

## 2022-01-01 RX ADMIN — INSULIN GLARGINE 10 UNITS: 100 INJECTION, SOLUTION SUBCUTANEOUS at 10:09

## 2022-01-01 RX ADMIN — HEPARIN SODIUM 5000 UNITS: 5000 INJECTION INTRAVENOUS; SUBCUTANEOUS at 21:10

## 2022-01-01 RX ADMIN — FENTANYL CITRATE 50 MCG: 50 INJECTION, SOLUTION INTRAMUSCULAR; INTRAVENOUS at 15:20

## 2022-01-01 RX ADMIN — PANTOPRAZOLE SODIUM 40 MG: 40 TABLET, DELAYED RELEASE ORAL at 21:14

## 2022-01-01 RX ADMIN — INSULIN LISPRO 12 UNITS: 100 INJECTION, SOLUTION INTRAVENOUS; SUBCUTANEOUS at 08:35

## 2022-01-01 RX ADMIN — INSULIN LISPRO 9 UNITS: 100 INJECTION, SOLUTION INTRAVENOUS; SUBCUTANEOUS at 21:22

## 2022-01-01 RX ADMIN — INSULIN LISPRO 6 UNITS: 100 INJECTION, SOLUTION INTRAVENOUS; SUBCUTANEOUS at 04:08

## 2022-01-01 RX ADMIN — PANTOPRAZOLE SODIUM 40 MG: 40 TABLET, DELAYED RELEASE ORAL at 20:42

## 2022-01-01 RX ADMIN — INSULIN LISPRO 9 UNITS: 100 INJECTION, SOLUTION INTRAVENOUS; SUBCUTANEOUS at 21:14

## 2022-01-01 RX ADMIN — MIDAZOLAM 2 MG: 1 INJECTION INTRAMUSCULAR; INTRAVENOUS at 11:27

## 2022-01-01 RX ADMIN — INSULIN LISPRO 2 UNITS: 100 INJECTION, SOLUTION INTRAVENOUS; SUBCUTANEOUS at 21:21

## 2022-01-01 RX ADMIN — INSULIN LISPRO 6 UNITS: 100 INJECTION, SOLUTION INTRAVENOUS; SUBCUTANEOUS at 17:04

## 2022-01-01 RX ADMIN — Medication: at 00:35

## 2022-01-01 RX ADMIN — ACETAMINOPHEN 650 MG: 325 TABLET ORAL at 15:00

## 2022-01-01 RX ADMIN — INSULIN GLARGINE 30 UNITS: 100 INJECTION, SOLUTION SUBCUTANEOUS at 09:05

## 2022-01-01 RX ADMIN — FENTANYL CITRATE 50 MCG: 50 INJECTION INTRAMUSCULAR; INTRAVENOUS at 10:44

## 2022-01-01 RX ADMIN — Medication 400 MG: at 09:27

## 2022-01-01 RX ADMIN — GABAPENTIN 400 MG: 400 CAPSULE ORAL at 08:53

## 2022-01-01 RX ADMIN — INSULIN LISPRO 6 UNITS: 100 INJECTION, SOLUTION INTRAVENOUS; SUBCUTANEOUS at 16:22

## 2022-01-01 RX ADMIN — DILTIAZEM HYDROCHLORIDE 30 MG: 60 TABLET, FILM COATED ORAL at 17:42

## 2022-01-01 RX ADMIN — DOXYCYCLINE HYCLATE 100 MG: 100 TABLET, COATED ORAL at 07:51

## 2022-01-01 RX ADMIN — TIZANIDINE 8 MG: 4 TABLET ORAL at 08:05

## 2022-01-01 RX ADMIN — ONDANSETRON HYDROCHLORIDE 4 MG: 2 INJECTION, SOLUTION INTRAMUSCULAR; INTRAVENOUS at 15:07

## 2022-01-01 RX ADMIN — SUCRALFATE 1 G: 1 TABLET ORAL at 17:01

## 2022-01-01 RX ADMIN — PAROXETINE HYDROCHLORIDE 20 MG: 20 TABLET, FILM COATED ORAL at 08:58

## 2022-01-01 RX ADMIN — SODIUM CHLORIDE, PRESERVATIVE FREE 10 ML: 5 INJECTION INTRAVENOUS at 20:32

## 2022-01-01 RX ADMIN — OXYCODONE 10 MG: 5 TABLET ORAL at 22:13

## 2022-01-01 RX ADMIN — PROMETHAZINE HYDROCHLORIDE 12.5 MG: 25 TABLET ORAL at 14:22

## 2022-01-01 RX ADMIN — MIDAZOLAM HYDROCHLORIDE 2 MG: 1 INJECTION, SOLUTION INTRAMUSCULAR; INTRAVENOUS at 10:49

## 2022-01-01 RX ADMIN — Medication 2 MCG/KG/HR: at 08:50

## 2022-01-01 RX ADMIN — CEFEPIME HYDROCHLORIDE 1000 MG: 1 INJECTION, POWDER, FOR SOLUTION INTRAMUSCULAR; INTRAVENOUS at 05:57

## 2022-01-01 RX ADMIN — HYDROMORPHONE HYDROCHLORIDE 1 MG: 1 INJECTION, SOLUTION INTRAMUSCULAR; INTRAVENOUS; SUBCUTANEOUS at 08:47

## 2022-01-01 RX ADMIN — SODIUM CHLORIDE, PRESERVATIVE FREE 10 ML: 5 INJECTION INTRAVENOUS at 09:26

## 2022-01-01 RX ADMIN — SUCRALFATE 1 G: 1 TABLET ORAL at 16:40

## 2022-01-01 RX ADMIN — PROCHLORPERAZINE MALEATE 5 MG: 5 TABLET ORAL at 03:56

## 2022-01-01 RX ADMIN — HEPARIN SODIUM 5000 UNITS: 5000 INJECTION INTRAVENOUS; SUBCUTANEOUS at 14:13

## 2022-01-01 RX ADMIN — SUCRALFATE 1 G: 1 TABLET ORAL at 21:12

## 2022-01-01 RX ADMIN — Medication 2 MCG/KG/HR: at 12:37

## 2022-01-01 RX ADMIN — CHLORHEXIDINE GLUCONATE 0.12% ORAL RINSE 15 ML: 1.2 LIQUID ORAL at 08:33

## 2022-01-01 RX ADMIN — DILTIAZEM HYDROCHLORIDE 30 MG: 60 TABLET, FILM COATED ORAL at 05:53

## 2022-01-01 RX ADMIN — CALCIUM GLUCONATE 1000 MG: 98 INJECTION, SOLUTION INTRAVENOUS at 03:04

## 2022-01-01 RX ADMIN — INSULIN LISPRO 9 UNITS: 100 INJECTION, SOLUTION INTRAVENOUS; SUBCUTANEOUS at 17:01

## 2022-01-01 RX ADMIN — DILTIAZEM HYDROCHLORIDE 30 MG: 60 TABLET, FILM COATED ORAL at 12:04

## 2022-01-01 RX ADMIN — OXYCODONE 10 MG: 5 TABLET ORAL at 11:52

## 2022-01-01 RX ADMIN — MIDAZOLAM 1 MG: 1 INJECTION INTRAMUSCULAR; INTRAVENOUS at 10:45

## 2022-01-01 RX ADMIN — Medication 1.7 MCG/KG/HR: at 22:26

## 2022-01-01 RX ADMIN — PANTOPRAZOLE SODIUM 40 MG: 40 TABLET, DELAYED RELEASE ORAL at 07:51

## 2022-01-01 RX ADMIN — NAFCILLIN SODIUM 2000 MG: 2 INJECTION, POWDER, LYOPHILIZED, FOR SOLUTION INTRAMUSCULAR; INTRAVENOUS at 15:50

## 2022-01-01 RX ADMIN — OXYCODONE 5 MG: 5 TABLET ORAL at 20:21

## 2022-01-01 RX ADMIN — OXYCODONE 10 MG: 5 TABLET ORAL at 21:10

## 2022-01-01 RX ADMIN — ONDANSETRON HYDROCHLORIDE 4 MG: 2 INJECTION, SOLUTION INTRAMUSCULAR; INTRAVENOUS at 04:45

## 2022-01-01 RX ADMIN — HYDRALAZINE HYDROCHLORIDE 10 MG: 20 INJECTION INTRAMUSCULAR; INTRAVENOUS at 08:35

## 2022-01-01 RX ADMIN — OXYCODONE HYDROCHLORIDE AND ACETAMINOPHEN 1 TABLET: 5; 325 TABLET ORAL at 13:20

## 2022-01-01 RX ADMIN — OXYCODONE 10 MG: 5 TABLET ORAL at 06:01

## 2022-01-01 RX ADMIN — AMPICILLIN SODIUM AND SULBACTAM SODIUM 3000 MG: 2; 1 INJECTION, POWDER, FOR SOLUTION INTRAMUSCULAR; INTRAVENOUS at 05:49

## 2022-01-01 RX ADMIN — HEPARIN SODIUM 5000 UNITS: 5000 INJECTION INTRAVENOUS; SUBCUTANEOUS at 07:58

## 2022-01-01 RX ADMIN — METOPROLOL SUCCINATE 50 MG: 50 TABLET, EXTENDED RELEASE ORAL at 21:10

## 2022-01-01 RX ADMIN — HYDROMORPHONE HYDROCHLORIDE 2 MG: 2 TABLET ORAL at 19:38

## 2022-01-01 RX ADMIN — INSULIN LISPRO 6 UNITS: 100 INJECTION, SOLUTION INTRAVENOUS; SUBCUTANEOUS at 12:58

## 2022-01-01 RX ADMIN — SUCRALFATE 1 G: 1 TABLET ORAL at 20:14

## 2022-01-01 RX ADMIN — SODIUM CHLORIDE 1000 ML: 9 INJECTION, SOLUTION INTRAVENOUS at 14:35

## 2022-01-01 RX ADMIN — INSULIN LISPRO 3 UNITS: 100 INJECTION, SOLUTION INTRAVENOUS; SUBCUTANEOUS at 03:49

## 2022-01-01 RX ADMIN — SODIUM CHLORIDE 8 MG/HR: 9 INJECTION, SOLUTION INTRAVENOUS at 21:33

## 2022-01-01 RX ADMIN — CALCIUM GLUCONATE 1000 MG: 98 INJECTION, SOLUTION INTRAVENOUS at 03:51

## 2022-01-01 RX ADMIN — PAROXETINE HYDROCHLORIDE 10 MG: 20 TABLET, FILM COATED ORAL at 08:39

## 2022-01-01 RX ADMIN — Medication 2 MCG/KG/HR: at 18:14

## 2022-01-01 RX ADMIN — INSULIN LISPRO 6 UNITS: 100 INJECTION, SOLUTION INTRAVENOUS; SUBCUTANEOUS at 11:21

## 2022-01-01 RX ADMIN — ASCORBIC ACID, THIAMINE MONONITRATE,RIBOFLAVIN, NIACINAMIDE, PYRIDOXINE HYDROCHLORIDE, FOLIC ACID, CYANOCOBALAMIN, BIOTIN, CALCIUM PANTOTHENATE, 1 MG: 100; 1.5; 1.7; 20; 10; 1; 6000; 150000; 5 CAPSULE, LIQUID FILLED ORAL at 08:10

## 2022-01-01 RX ADMIN — SUCRALFATE 1 G: 1 TABLET ORAL at 06:36

## 2022-01-01 RX ADMIN — FUROSEMIDE 40 MG: 40 TABLET ORAL at 06:41

## 2022-01-01 RX ADMIN — INSULIN LISPRO 6 UNITS: 100 INJECTION, SOLUTION INTRAVENOUS; SUBCUTANEOUS at 06:27

## 2022-01-01 RX ADMIN — OXYCODONE 10 MG: 5 TABLET ORAL at 02:30

## 2022-01-01 RX ADMIN — AMPICILLIN SODIUM AND SULBACTAM SODIUM 3000 MG: 2; 1 INJECTION, POWDER, FOR SOLUTION INTRAMUSCULAR; INTRAVENOUS at 06:46

## 2022-01-01 RX ADMIN — SODIUM CHLORIDE, PRESERVATIVE FREE 10 ML: 5 INJECTION INTRAVENOUS at 09:57

## 2022-01-01 RX ADMIN — LABETALOL HYDROCHLORIDE 20 MG: 5 INJECTION, SOLUTION INTRAVENOUS at 03:24

## 2022-01-01 RX ADMIN — MUPIROCIN: 20 OINTMENT TOPICAL at 08:33

## 2022-01-01 RX ADMIN — Medication 800 MG: at 20:01

## 2022-01-01 RX ADMIN — IOPAMIDOL 100 ML: 755 INJECTION, SOLUTION INTRAVENOUS at 11:12

## 2022-01-01 RX ADMIN — OXYCODONE 10 MG: 5 TABLET ORAL at 18:42

## 2022-01-01 RX ADMIN — FLUTICASONE PROPIONATE 2 SPRAY: 50 SPRAY, METERED NASAL at 20:33

## 2022-01-01 RX ADMIN — PANTOPRAZOLE SODIUM 40 MG: 40 TABLET, DELAYED RELEASE ORAL at 15:54

## 2022-01-01 RX ADMIN — TAMSULOSIN HYDROCHLORIDE 0.4 MG: 0.4 CAPSULE ORAL at 20:05

## 2022-01-01 RX ADMIN — METOCLOPRAMIDE 10 MG: 10 TABLET ORAL at 20:51

## 2022-01-01 RX ADMIN — INSULIN LISPRO 3 UNITS: 100 INJECTION, SOLUTION INTRAVENOUS; SUBCUTANEOUS at 04:07

## 2022-01-01 RX ADMIN — SODIUM CHLORIDE: 9 INJECTION, SOLUTION INTRAVENOUS at 05:09

## 2022-01-01 RX ADMIN — Medication 800 MG: at 13:56

## 2022-01-01 RX ADMIN — INSULIN GLARGINE 25 UNITS: 100 INJECTION, SOLUTION SUBCUTANEOUS at 21:06

## 2022-01-01 RX ADMIN — HYDROCORTISONE SODIUM SUCCINATE 50 MG: 100 INJECTION, POWDER, FOR SOLUTION INTRAMUSCULAR; INTRAVENOUS at 14:30

## 2022-01-01 RX ADMIN — PANTOPRAZOLE SODIUM 40 MG: 40 TABLET, DELAYED RELEASE ORAL at 20:53

## 2022-01-01 RX ADMIN — PROPOFOL 100 MG: 10 INJECTION, EMULSION INTRAVENOUS at 12:33

## 2022-01-01 RX ADMIN — PANTOPRAZOLE SODIUM 40 MG: 40 TABLET, DELAYED RELEASE ORAL at 15:14

## 2022-01-01 RX ADMIN — Medication: at 10:29

## 2022-01-01 RX ADMIN — QUETIAPINE FUMARATE 25 MG: 25 TABLET ORAL at 08:50

## 2022-01-01 RX ADMIN — Medication 800 MG: at 08:46

## 2022-01-01 RX ADMIN — DILTIAZEM HYDROCHLORIDE 30 MG: 60 TABLET, FILM COATED ORAL at 00:03

## 2022-01-01 RX ADMIN — GABAPENTIN 400 MG: 400 CAPSULE ORAL at 20:48

## 2022-01-01 RX ADMIN — PAROXETINE HYDROCHLORIDE 20 MG: 20 TABLET, FILM COATED ORAL at 09:10

## 2022-01-01 RX ADMIN — TIZANIDINE 8 MG: 4 TABLET ORAL at 21:18

## 2022-01-01 RX ADMIN — METOPROLOL TARTRATE 25 MG: 25 TABLET, FILM COATED ORAL at 08:58

## 2022-01-01 RX ADMIN — INSULIN LISPRO 6 UNITS: 100 INJECTION, SOLUTION INTRAVENOUS; SUBCUTANEOUS at 00:29

## 2022-01-01 RX ADMIN — SUCRALFATE 1 G: 1 TABLET ORAL at 20:25

## 2022-01-01 RX ADMIN — Medication 800 MG: at 21:04

## 2022-01-01 RX ADMIN — TIZANIDINE 8 MG: 4 TABLET ORAL at 20:12

## 2022-01-01 RX ADMIN — METOCLOPRAMIDE 10 MG: 10 TABLET ORAL at 20:18

## 2022-01-01 RX ADMIN — MIDAZOLAM HYDROCHLORIDE 2 MG: 1 INJECTION, SOLUTION INTRAMUSCULAR; INTRAVENOUS at 17:24

## 2022-01-01 RX ADMIN — OXYCODONE HYDROCHLORIDE AND ACETAMINOPHEN 1 TABLET: 5; 325 TABLET ORAL at 04:41

## 2022-01-01 RX ADMIN — QUETIAPINE FUMARATE 100 MG: 100 TABLET ORAL at 22:13

## 2022-01-01 RX ADMIN — INSULIN LISPRO 9 UNITS: 100 INJECTION, SOLUTION INTRAVENOUS; SUBCUTANEOUS at 18:04

## 2022-01-01 RX ADMIN — Medication 2 MCG/KG/HR: at 09:14

## 2022-01-01 RX ADMIN — NAFCILLIN SODIUM 2000 MG: 2 INJECTION, POWDER, LYOPHILIZED, FOR SOLUTION INTRAMUSCULAR; INTRAVENOUS at 03:31

## 2022-01-01 RX ADMIN — OLANZAPINE 5 MG: 5 TABLET, FILM COATED ORAL at 20:49

## 2022-01-01 RX ADMIN — QUETIAPINE FUMARATE 100 MG: 100 TABLET ORAL at 20:33

## 2022-01-01 RX ADMIN — AMPICILLIN SODIUM AND SULBACTAM SODIUM 3000 MG: 2; 1 INJECTION, POWDER, FOR SOLUTION INTRAMUSCULAR; INTRAVENOUS at 09:15

## 2022-01-01 RX ADMIN — OXYCODONE 10 MG: 5 TABLET ORAL at 16:39

## 2022-01-01 RX ADMIN — TIZANIDINE 8 MG: 4 TABLET ORAL at 20:11

## 2022-01-01 RX ADMIN — OXYCODONE 10 MG: 5 TABLET ORAL at 13:47

## 2022-01-01 RX ADMIN — HYDROMORPHONE HYDROCHLORIDE 0.5 MG: 2 TABLET ORAL at 08:37

## 2022-01-01 RX ADMIN — GABAPENTIN 400 MG: 400 CAPSULE ORAL at 19:38

## 2022-01-01 RX ADMIN — SODIUM PHOSPHATE, MONOBASIC, MONOHYDRATE 18 MMOL: 276; 142 INJECTION, SOLUTION INTRAVENOUS at 03:05

## 2022-01-01 RX ADMIN — Medication 400 MG: at 21:49

## 2022-01-01 RX ADMIN — SUCRALFATE 1 G: 1 TABLET ORAL at 10:20

## 2022-01-01 RX ADMIN — SODIUM CHLORIDE: 9 INJECTION, SOLUTION INTRAVENOUS at 15:22

## 2022-01-01 RX ADMIN — Medication 400 MG: at 21:27

## 2022-01-01 RX ADMIN — Medication 800 MG: at 15:50

## 2022-01-01 RX ADMIN — HYDROMORPHONE HYDROCHLORIDE 1 MG: 1 INJECTION, SOLUTION INTRAMUSCULAR; INTRAVENOUS; SUBCUTANEOUS at 13:28

## 2022-01-01 RX ADMIN — INSULIN LISPRO 6 UNITS: 100 INJECTION, SOLUTION INTRAVENOUS; SUBCUTANEOUS at 11:55

## 2022-01-01 RX ADMIN — LINEZOLID 600 MG: 600 INJECTION, SOLUTION INTRAVENOUS at 06:34

## 2022-01-01 RX ADMIN — Medication 1 TABLET: at 11:16

## 2022-01-01 RX ADMIN — OXYCODONE HYDROCHLORIDE AND ACETAMINOPHEN 2 TABLET: 5; 325 TABLET ORAL at 21:59

## 2022-01-01 RX ADMIN — Medication 2 MCG/KG/HR: at 11:02

## 2022-01-01 RX ADMIN — DILTIAZEM HYDROCHLORIDE 30 MG: 60 TABLET, FILM COATED ORAL at 18:01

## 2022-01-01 RX ADMIN — HEPARIN SODIUM 5000 UNITS: 5000 INJECTION INTRAVENOUS; SUBCUTANEOUS at 06:34

## 2022-01-01 RX ADMIN — NAFCILLIN SODIUM 2000 MG: 2 INJECTION, POWDER, LYOPHILIZED, FOR SOLUTION INTRAMUSCULAR; INTRAVENOUS at 18:09

## 2022-01-01 RX ADMIN — Medication 100 MCG/HR: at 12:44

## 2022-01-01 RX ADMIN — INSULIN GLARGINE 25 UNITS: 100 INJECTION, SOLUTION SUBCUTANEOUS at 22:23

## 2022-01-01 RX ADMIN — Medication 1 TABLET: at 12:02

## 2022-01-01 RX ADMIN — SODIUM CHLORIDE, PRESERVATIVE FREE 10 ML: 5 INJECTION INTRAVENOUS at 10:45

## 2022-01-01 RX ADMIN — NAFCILLIN SODIUM 2000 MG: 2 INJECTION, POWDER, LYOPHILIZED, FOR SOLUTION INTRAMUSCULAR; INTRAVENOUS at 18:14

## 2022-01-01 RX ADMIN — INSULIN LISPRO 6 UNITS: 100 INJECTION, SOLUTION INTRAVENOUS; SUBCUTANEOUS at 17:53

## 2022-01-01 RX ADMIN — TAMSULOSIN HYDROCHLORIDE 0.4 MG: 0.4 CAPSULE ORAL at 21:49

## 2022-01-01 RX ADMIN — AMPICILLIN SODIUM AND SULBACTAM SODIUM 3000 MG: 2; 1 INJECTION, POWDER, FOR SOLUTION INTRAMUSCULAR; INTRAVENOUS at 18:27

## 2022-01-01 RX ADMIN — GABAPENTIN 400 MG: 400 CAPSULE ORAL at 14:50

## 2022-01-01 RX ADMIN — OXYCODONE 10 MG: 5 TABLET ORAL at 07:52

## 2022-01-01 RX ADMIN — SODIUM CHLORIDE, PRESERVATIVE FREE 10 ML: 5 INJECTION INTRAVENOUS at 07:58

## 2022-01-01 RX ADMIN — Medication: at 21:10

## 2022-01-01 RX ADMIN — INSULIN LISPRO 6 UNITS: 100 INJECTION, SOLUTION INTRAVENOUS; SUBCUTANEOUS at 20:35

## 2022-01-01 RX ADMIN — CARVEDILOL 6.25 MG: 6.25 TABLET, FILM COATED ORAL at 10:54

## 2022-01-01 RX ADMIN — METOCLOPRAMIDE 10 MG: 10 TABLET ORAL at 21:28

## 2022-01-01 RX ADMIN — INSULIN LISPRO 6 UNITS: 100 INJECTION, SOLUTION INTRAVENOUS; SUBCUTANEOUS at 12:49

## 2022-01-01 RX ADMIN — SUCRALFATE 1 G: 1 TABLET ORAL at 11:41

## 2022-01-01 RX ADMIN — PANTOPRAZOLE SODIUM 40 MG: 40 INJECTION, POWDER, FOR SOLUTION INTRAVENOUS at 08:09

## 2022-01-01 RX ADMIN — MIDAZOLAM HYDROCHLORIDE 2 MG: 2 INJECTION, SOLUTION INTRAMUSCULAR; INTRAVENOUS at 14:24

## 2022-01-01 RX ADMIN — CARVEDILOL 12.5 MG: 6.25 TABLET, FILM COATED ORAL at 09:06

## 2022-01-01 RX ADMIN — TIZANIDINE 8 MG: 4 TABLET ORAL at 09:10

## 2022-01-01 RX ADMIN — SODIUM CHLORIDE, PRESERVATIVE FREE 10 ML: 5 INJECTION INTRAVENOUS at 19:59

## 2022-01-01 RX ADMIN — GABAPENTIN 200 MG: 100 CAPSULE ORAL at 09:01

## 2022-01-01 RX ADMIN — TIZANIDINE 8 MG: 4 TABLET ORAL at 09:01

## 2022-01-01 RX ADMIN — NAFCILLIN SODIUM 2000 MG: 2 INJECTION, POWDER, LYOPHILIZED, FOR SOLUTION INTRAMUSCULAR; INTRAVENOUS at 17:53

## 2022-01-01 RX ADMIN — Medication 1 TABLET: at 11:21

## 2022-01-01 RX ADMIN — DILTIAZEM HYDROCHLORIDE 30 MG: 60 TABLET, FILM COATED ORAL at 06:17

## 2022-01-01 RX ADMIN — METOCLOPRAMIDE 10 MG: 10 TABLET ORAL at 21:20

## 2022-01-01 RX ADMIN — INSULIN LISPRO 3 UNITS: 100 INJECTION, SOLUTION INTRAVENOUS; SUBCUTANEOUS at 20:52

## 2022-01-01 RX ADMIN — TAMSULOSIN HYDROCHLORIDE 0.4 MG: 0.4 CAPSULE ORAL at 20:50

## 2022-01-01 RX ADMIN — FENTANYL CITRATE 50 MCG: 50 INJECTION INTRAMUSCULAR; INTRAVENOUS at 14:51

## 2022-01-01 RX ADMIN — METOCLOPRAMIDE 10 MG: 10 TABLET ORAL at 05:56

## 2022-01-01 RX ADMIN — HYDROMORPHONE HYDROCHLORIDE 1 MG: 1 INJECTION, SOLUTION INTRAMUSCULAR; INTRAVENOUS; SUBCUTANEOUS at 04:45

## 2022-01-01 RX ADMIN — AMPICILLIN SODIUM AND SULBACTAM SODIUM 3000 MG: 2; 1 INJECTION, POWDER, FOR SOLUTION INTRAMUSCULAR; INTRAVENOUS at 10:34

## 2022-01-01 RX ADMIN — FLUTICASONE PROPIONATE 2 SPRAY: 50 SPRAY, METERED NASAL at 21:51

## 2022-01-01 RX ADMIN — PANTOPRAZOLE SODIUM 40 MG: 40 INJECTION, POWDER, FOR SOLUTION INTRAVENOUS at 18:20

## 2022-01-01 RX ADMIN — SODIUM CHLORIDE 8 MG/HR: 9 INJECTION, SOLUTION INTRAVENOUS at 05:50

## 2022-01-01 RX ADMIN — NAFCILLIN SODIUM 2000 MG: 2 INJECTION, POWDER, LYOPHILIZED, FOR SOLUTION INTRAMUSCULAR; INTRAVENOUS at 03:52

## 2022-01-01 RX ADMIN — DILTIAZEM HYDROCHLORIDE 5 MG/HR: 5 INJECTION, SOLUTION INTRAVENOUS at 23:48

## 2022-01-01 RX ADMIN — INSULIN GLARGINE 25 UNITS: 100 INJECTION, SOLUTION SUBCUTANEOUS at 08:36

## 2022-01-01 RX ADMIN — Medication 2 MCG/KG/HR: at 21:52

## 2022-01-01 RX ADMIN — INSULIN LISPRO 3 UNITS: 100 INJECTION, SOLUTION INTRAVENOUS; SUBCUTANEOUS at 23:26

## 2022-01-01 RX ADMIN — SUCRALFATE 1 G: 1 TABLET ORAL at 12:44

## 2022-01-01 RX ADMIN — PANTOPRAZOLE SODIUM 40 MG: 40 TABLET, DELAYED RELEASE ORAL at 08:24

## 2022-01-01 RX ADMIN — TIZANIDINE 4 MG: 4 TABLET ORAL at 21:19

## 2022-01-01 RX ADMIN — OXYCODONE HYDROCHLORIDE AND ACETAMINOPHEN 1 TABLET: 5; 325 TABLET ORAL at 00:48

## 2022-01-01 RX ADMIN — PAROXETINE HYDROCHLORIDE 20 MG: 20 TABLET, FILM COATED ORAL at 09:43

## 2022-01-01 RX ADMIN — PANTOPRAZOLE SODIUM 40 MG: 40 INJECTION, POWDER, FOR SOLUTION INTRAVENOUS at 20:12

## 2022-01-01 RX ADMIN — ONDANSETRON HYDROCHLORIDE 4 MG: 2 INJECTION, SOLUTION INTRAMUSCULAR; INTRAVENOUS at 12:14

## 2022-01-01 RX ADMIN — PROCHLORPERAZINE MALEATE 5 MG: 5 TABLET ORAL at 08:24

## 2022-01-01 RX ADMIN — Medication 2 MCG/KG/HR: at 01:22

## 2022-01-01 RX ADMIN — INSULIN LISPRO 9 UNITS: 100 INJECTION, SOLUTION INTRAVENOUS; SUBCUTANEOUS at 11:26

## 2022-01-01 RX ADMIN — AMPICILLIN SODIUM AND SULBACTAM SODIUM 3000 MG: 2; 1 INJECTION, POWDER, FOR SOLUTION INTRAMUSCULAR; INTRAVENOUS at 23:08

## 2022-01-01 RX ADMIN — GABAPENTIN 200 MG: 100 CAPSULE ORAL at 09:31

## 2022-01-01 RX ADMIN — SODIUM CHLORIDE, PRESERVATIVE FREE 10 ML: 5 INJECTION INTRAVENOUS at 20:15

## 2022-01-01 RX ADMIN — Medication 125 MCG/HR: at 20:15

## 2022-01-01 RX ADMIN — HEPARIN SODIUM 5000 UNITS: 5000 INJECTION INTRAVENOUS; SUBCUTANEOUS at 14:48

## 2022-01-01 RX ADMIN — FUROSEMIDE 40 MG: 40 TABLET ORAL at 15:22

## 2022-01-01 RX ADMIN — METOCLOPRAMIDE 10 MG: 10 TABLET ORAL at 17:03

## 2022-01-01 RX ADMIN — HYDROCORTISONE SODIUM SUCCINATE 50 MG: 100 INJECTION, POWDER, FOR SOLUTION INTRAMUSCULAR; INTRAVENOUS at 02:31

## 2022-01-01 RX ADMIN — HYDROMORPHONE HYDROCHLORIDE 1 MG: 1 INJECTION, SOLUTION INTRAMUSCULAR; INTRAVENOUS; SUBCUTANEOUS at 03:15

## 2022-01-01 RX ADMIN — FENTANYL CITRATE 50 MCG: 50 INJECTION INTRAMUSCULAR; INTRAVENOUS at 04:50

## 2022-01-01 RX ADMIN — CARVEDILOL 12.5 MG: 6.25 TABLET, FILM COATED ORAL at 17:06

## 2022-01-01 RX ADMIN — HYDROMORPHONE HYDROCHLORIDE 2 MG: 2 TABLET ORAL at 08:46

## 2022-01-01 RX ADMIN — NAFCILLIN SODIUM 2000 MG: 2 INJECTION, POWDER, LYOPHILIZED, FOR SOLUTION INTRAMUSCULAR; INTRAVENOUS at 01:08

## 2022-01-01 RX ADMIN — ACETAMINOPHEN 650 MG: 325 TABLET ORAL at 02:10

## 2022-01-01 RX ADMIN — TAMSULOSIN HYDROCHLORIDE 0.4 MG: 0.4 CAPSULE ORAL at 19:57

## 2022-01-01 RX ADMIN — Medication 1.6 MCG/KG/HR: at 23:59

## 2022-01-01 RX ADMIN — SUCRALFATE 1 G: 1 TABLET ORAL at 06:08

## 2022-01-01 RX ADMIN — HEPARIN SODIUM 5000 UNITS: 5000 INJECTION INTRAVENOUS; SUBCUTANEOUS at 20:45

## 2022-01-01 RX ADMIN — Medication: at 09:39

## 2022-01-01 RX ADMIN — PROPOFOL 15 MCG/KG/MIN: 10 INJECTION, EMULSION INTRAVENOUS at 06:50

## 2022-01-01 RX ADMIN — LOPERAMIDE HYDROCHLORIDE 2 MG: 2 CAPSULE ORAL at 01:11

## 2022-01-01 RX ADMIN — METOCLOPRAMIDE 10 MG: 10 TABLET ORAL at 06:02

## 2022-01-01 RX ADMIN — HYDROMORPHONE HYDROCHLORIDE 1 MG: 2 TABLET ORAL at 08:03

## 2022-01-01 RX ADMIN — Medication: at 09:00

## 2022-01-01 RX ADMIN — SODIUM CHLORIDE: 9 INJECTION, SOLUTION INTRAVENOUS at 17:40

## 2022-01-01 RX ADMIN — Medication: at 08:07

## 2022-01-01 RX ADMIN — MIDAZOLAM HYDROCHLORIDE 2 MG: 2 INJECTION, SOLUTION INTRAMUSCULAR; INTRAVENOUS at 01:09

## 2022-01-01 RX ADMIN — GABAPENTIN 400 MG: 400 CAPSULE ORAL at 20:09

## 2022-01-01 RX ADMIN — HEPARIN SODIUM 5000 UNITS: 5000 INJECTION INTRAVENOUS; SUBCUTANEOUS at 21:24

## 2022-01-01 RX ADMIN — TRAZODONE HYDROCHLORIDE 50 MG: 50 TABLET ORAL at 20:09

## 2022-01-01 RX ADMIN — HYDROCORTISONE SODIUM SUCCINATE 50 MG: 100 INJECTION, POWDER, FOR SOLUTION INTRAMUSCULAR; INTRAVENOUS at 02:00

## 2022-01-01 RX ADMIN — INSULIN LISPRO 4 UNITS: 100 INJECTION, SOLUTION INTRAVENOUS; SUBCUTANEOUS at 06:27

## 2022-01-01 RX ADMIN — Medication 2 MCG/KG/HR: at 05:45

## 2022-01-01 RX ADMIN — METOCLOPRAMIDE 10 MG: 10 TABLET ORAL at 20:22

## 2022-01-01 RX ADMIN — FLUTICASONE PROPIONATE 2 SPRAY: 50 SPRAY, METERED NASAL at 21:23

## 2022-01-01 RX ADMIN — INSULIN LISPRO 3 UNITS: 100 INJECTION, SOLUTION INTRAVENOUS; SUBCUTANEOUS at 17:11

## 2022-01-01 RX ADMIN — INSULIN LISPRO 4 UNITS: 100 INJECTION, SOLUTION INTRAVENOUS; SUBCUTANEOUS at 11:46

## 2022-01-01 RX ADMIN — DOXYCYCLINE HYCLATE 100 MG: 100 TABLET, COATED ORAL at 20:48

## 2022-01-01 RX ADMIN — INSULIN GLARGINE 10 UNITS: 100 INJECTION, SOLUTION SUBCUTANEOUS at 09:04

## 2022-01-01 RX ADMIN — QUETIAPINE FUMARATE 50 MG: 25 TABLET ORAL at 20:59

## 2022-01-01 RX ADMIN — OLANZAPINE 5 MG: 5 TABLET, FILM COATED ORAL at 21:18

## 2022-01-01 RX ADMIN — METOCLOPRAMIDE 10 MG: 10 TABLET ORAL at 16:02

## 2022-01-01 RX ADMIN — PANTOPRAZOLE SODIUM 40 MG: 40 INJECTION, POWDER, FOR SOLUTION INTRAVENOUS at 20:04

## 2022-01-01 RX ADMIN — INSULIN LISPRO 3 UNITS: 100 INJECTION, SOLUTION INTRAVENOUS; SUBCUTANEOUS at 09:02

## 2022-01-01 RX ADMIN — TAMSULOSIN HYDROCHLORIDE 0.4 MG: 0.4 CAPSULE ORAL at 19:38

## 2022-01-01 RX ADMIN — DICLOFENAC SODIUM 4 G: 10 GEL TOPICAL at 01:45

## 2022-01-01 RX ADMIN — Medication 800 MG: at 14:11

## 2022-01-01 RX ADMIN — GABAPENTIN 200 MG: 100 CAPSULE ORAL at 09:05

## 2022-01-01 RX ADMIN — GABAPENTIN 400 MG: 400 CAPSULE ORAL at 14:36

## 2022-01-01 RX ADMIN — Medication 800 MG: at 14:29

## 2022-01-01 RX ADMIN — HEPARIN SODIUM 1400 UNITS/HR: 10000 INJECTION INTRAVENOUS; SUBCUTANEOUS at 20:30

## 2022-01-01 RX ADMIN — SODIUM CHLORIDE, PRESERVATIVE FREE 10 ML: 5 INJECTION INTRAVENOUS at 07:57

## 2022-01-01 RX ADMIN — METOPROLOL SUCCINATE 50 MG: 50 TABLET, EXTENDED RELEASE ORAL at 07:56

## 2022-01-01 RX ADMIN — SUCRALFATE 1 G: 1 TABLET ORAL at 05:25

## 2022-01-01 RX ADMIN — FLUCONAZOLE 100 MG: 100 TABLET ORAL at 09:30

## 2022-01-01 RX ADMIN — PROPOFOL 10 MCG/KG/MIN: 10 INJECTION, EMULSION INTRAVENOUS at 03:52

## 2022-01-01 RX ADMIN — Medication 1.5 MCG/KG/HR: at 23:43

## 2022-01-01 RX ADMIN — SUCRALFATE 1 G: 1 TABLET ORAL at 05:41

## 2022-01-01 RX ADMIN — INSULIN GLARGINE 15 UNITS: 100 INJECTION, SOLUTION SUBCUTANEOUS at 13:31

## 2022-01-01 RX ADMIN — HEPARIN SODIUM 5000 UNITS: 5000 INJECTION INTRAVENOUS; SUBCUTANEOUS at 22:00

## 2022-01-01 RX ADMIN — SODIUM ZIRCONIUM CYCLOSILICATE 10 G: 10 POWDER, FOR SUSPENSION ORAL at 18:01

## 2022-01-01 RX ADMIN — DILTIAZEM HYDROCHLORIDE 30 MG: 60 TABLET, FILM COATED ORAL at 23:38

## 2022-01-01 RX ADMIN — QUETIAPINE FUMARATE 12.5 MG: 25 TABLET ORAL at 09:07

## 2022-01-01 RX ADMIN — PROPOFOL 20 MCG/KG/MIN: 10 INJECTION, EMULSION INTRAVENOUS at 08:31

## 2022-01-01 RX ADMIN — TAMSULOSIN HYDROCHLORIDE 0.4 MG: 0.4 CAPSULE ORAL at 21:10

## 2022-01-01 RX ADMIN — ALBUMIN (HUMAN) 12.5 G: 0.25 INJECTION, SOLUTION INTRAVENOUS at 10:47

## 2022-01-01 RX ADMIN — SODIUM CHLORIDE, PRESERVATIVE FREE 10 ML: 5 INJECTION INTRAVENOUS at 07:38

## 2022-01-01 RX ADMIN — METOCLOPRAMIDE 10 MG: 10 TABLET ORAL at 22:13

## 2022-01-01 RX ADMIN — OXYCODONE HYDROCHLORIDE AND ACETAMINOPHEN 1 TABLET: 5; 325 TABLET ORAL at 22:48

## 2022-01-01 RX ADMIN — DILTIAZEM HYDROCHLORIDE 30 MG: 60 TABLET, FILM COATED ORAL at 11:47

## 2022-01-01 RX ADMIN — Medication: at 20:55

## 2022-01-01 RX ADMIN — HYDROMORPHONE HYDROCHLORIDE 2 MG: 2 TABLET ORAL at 13:56

## 2022-01-01 RX ADMIN — QUETIAPINE FUMARATE 100 MG: 100 TABLET ORAL at 21:56

## 2022-01-01 RX ADMIN — FAMOTIDINE 20 MG: 10 INJECTION, SOLUTION INTRAVENOUS at 09:28

## 2022-01-01 RX ADMIN — Medication: at 07:50

## 2022-01-01 RX ADMIN — DILTIAZEM HYDROCHLORIDE 15 MG/HR: 5 INJECTION, SOLUTION INTRAVENOUS at 16:54

## 2022-01-01 RX ADMIN — Medication 2 MCG/KG/HR: at 11:19

## 2022-01-01 RX ADMIN — OXYCODONE 10 MG: 5 TABLET ORAL at 16:34

## 2022-01-01 RX ADMIN — LABETALOL HYDROCHLORIDE 20 MG: 5 INJECTION, SOLUTION INTRAVENOUS at 09:40

## 2022-01-01 RX ADMIN — SODIUM CHLORIDE, PRESERVATIVE FREE 10 ML: 5 INJECTION INTRAVENOUS at 08:28

## 2022-01-01 RX ADMIN — TIZANIDINE 8 MG: 4 TABLET ORAL at 21:03

## 2022-01-01 RX ADMIN — INSULIN LISPRO 9 UNITS: 100 INJECTION, SOLUTION INTRAVENOUS; SUBCUTANEOUS at 17:14

## 2022-01-01 RX ADMIN — INSULIN GLARGINE 25 UNITS: 100 INJECTION, SOLUTION SUBCUTANEOUS at 21:15

## 2022-01-01 RX ADMIN — OXYCODONE 10 MG: 5 TABLET ORAL at 08:33

## 2022-01-01 RX ADMIN — MIDAZOLAM HYDROCHLORIDE 2 MG: 2 INJECTION, SOLUTION INTRAMUSCULAR; INTRAVENOUS at 01:06

## 2022-01-01 RX ADMIN — PROPOFOL 25 MCG/KG/MIN: 10 INJECTION, EMULSION INTRAVENOUS at 13:25

## 2022-01-01 RX ADMIN — ASCORBIC ACID, THIAMINE MONONITRATE,RIBOFLAVIN, NIACINAMIDE, PYRIDOXINE HYDROCHLORIDE, FOLIC ACID, CYANOCOBALAMIN, BIOTIN, CALCIUM PANTOTHENATE, 1 MG: 100; 1.5; 1.7; 20; 10; 1; 6000; 150000; 5 CAPSULE, LIQUID FILLED ORAL at 08:13

## 2022-01-01 RX ADMIN — Medication 100 MCG/HR: at 18:24

## 2022-01-01 RX ADMIN — OXYCODONE HYDROCHLORIDE AND ACETAMINOPHEN 1 TABLET: 5; 325 TABLET ORAL at 09:04

## 2022-01-01 RX ADMIN — SUCRALFATE 1 G: 1 TABLET ORAL at 10:59

## 2022-01-01 RX ADMIN — PERFLUTREN 1.43 MG: 6.52 INJECTION, SUSPENSION INTRAVENOUS at 11:41

## 2022-01-01 RX ADMIN — Medication 400 MG: at 07:49

## 2022-01-01 RX ADMIN — Medication 2 MCG/KG/HR: at 04:16

## 2022-01-01 RX ADMIN — Medication: at 02:00

## 2022-01-01 RX ADMIN — Medication 2 MCG/KG/HR: at 19:06

## 2022-01-01 RX ADMIN — Medication: at 07:47

## 2022-01-01 RX ADMIN — Medication: at 21:15

## 2022-01-01 RX ADMIN — DILTIAZEM HYDROCHLORIDE 30 MG: 60 TABLET, FILM COATED ORAL at 22:47

## 2022-01-01 RX ADMIN — LIDOCAINE HYDROCHLORIDE 3 ML: 20 INJECTION, SOLUTION INFILTRATION; PERINEURAL at 12:20

## 2022-01-01 RX ADMIN — ASCORBIC ACID, THIAMINE MONONITRATE,RIBOFLAVIN, NIACINAMIDE, PYRIDOXINE HYDROCHLORIDE, FOLIC ACID, CYANOCOBALAMIN, BIOTIN, CALCIUM PANTOTHENATE, 1 MG: 100; 1.5; 1.7; 20; 10; 1; 6000; 150000; 5 CAPSULE, LIQUID FILLED ORAL at 07:48

## 2022-01-01 RX ADMIN — METOCLOPRAMIDE 10 MG: 10 TABLET ORAL at 16:06

## 2022-01-01 RX ADMIN — INSULIN LISPRO 4 UNITS: 100 INJECTION, SOLUTION INTRAVENOUS; SUBCUTANEOUS at 14:00

## 2022-01-01 RX ADMIN — OXYCODONE 10 MG: 5 TABLET ORAL at 21:35

## 2022-01-01 RX ADMIN — OXYCODONE HYDROCHLORIDE AND ACETAMINOPHEN 1 TABLET: 5; 325 TABLET ORAL at 04:01

## 2022-01-01 RX ADMIN — DOXYCYCLINE HYCLATE 100 MG: 100 TABLET, COATED ORAL at 20:13

## 2022-01-01 RX ADMIN — NAFCILLIN SODIUM 2000 MG: 2 INJECTION, POWDER, LYOPHILIZED, FOR SOLUTION INTRAMUSCULAR; INTRAVENOUS at 15:00

## 2022-01-01 RX ADMIN — Medication 2 MCG/KG/HR: at 16:12

## 2022-01-01 RX ADMIN — SUCRALFATE 1 G: 1 TABLET ORAL at 23:26

## 2022-01-01 RX ADMIN — OXYCODONE HYDROCHLORIDE AND ACETAMINOPHEN 1 TABLET: 5; 325 TABLET ORAL at 09:55

## 2022-01-01 RX ADMIN — ALBUMIN (HUMAN) 12.5 G: 0.25 INJECTION, SOLUTION INTRAVENOUS at 11:43

## 2022-01-01 RX ADMIN — TIZANIDINE 4 MG: 4 TABLET ORAL at 15:16

## 2022-01-01 RX ADMIN — SODIUM CHLORIDE, PRESERVATIVE FREE 10 ML: 5 INJECTION INTRAVENOUS at 21:14

## 2022-01-01 RX ADMIN — PANTOPRAZOLE SODIUM 40 MG: 40 INJECTION, POWDER, FOR SOLUTION INTRAVENOUS at 11:29

## 2022-01-01 RX ADMIN — PROPOFOL 25 MCG/KG/MIN: 10 INJECTION, EMULSION INTRAVENOUS at 17:39

## 2022-01-01 RX ADMIN — PANTOPRAZOLE SODIUM 40 MG: 40 TABLET, DELAYED RELEASE ORAL at 20:05

## 2022-01-01 RX ADMIN — HYDROMORPHONE HYDROCHLORIDE 1 MG: 1 INJECTION, SOLUTION INTRAMUSCULAR; INTRAVENOUS; SUBCUTANEOUS at 06:33

## 2022-01-01 RX ADMIN — PANTOPRAZOLE SODIUM 40 MG: 40 TABLET, DELAYED RELEASE ORAL at 20:19

## 2022-01-01 RX ADMIN — HEPARIN SODIUM 5000 UNITS: 5000 INJECTION INTRAVENOUS; SUBCUTANEOUS at 13:47

## 2022-01-01 RX ADMIN — HEPARIN SODIUM 5000 UNITS: 5000 INJECTION INTRAVENOUS; SUBCUTANEOUS at 14:33

## 2022-01-01 RX ADMIN — Medication 1 TABLET: at 11:05

## 2022-01-01 RX ADMIN — INSULIN LISPRO 4 UNITS: 100 INJECTION, SOLUTION INTRAVENOUS; SUBCUTANEOUS at 16:44

## 2022-01-01 RX ADMIN — METOCLOPRAMIDE 10 MG: 10 TABLET ORAL at 06:18

## 2022-01-01 RX ADMIN — INSULIN GLARGINE 17 UNITS: 100 INJECTION, SOLUTION SUBCUTANEOUS at 20:28

## 2022-01-01 RX ADMIN — METOCLOPRAMIDE 10 MG: 10 TABLET ORAL at 12:01

## 2022-01-01 RX ADMIN — OXYCODONE HYDROCHLORIDE AND ACETAMINOPHEN 1 TABLET: 5; 325 TABLET ORAL at 11:19

## 2022-01-01 RX ADMIN — INSULIN LISPRO 4 UNITS: 100 INJECTION, SOLUTION INTRAVENOUS; SUBCUTANEOUS at 17:09

## 2022-01-01 RX ADMIN — FUROSEMIDE 40 MG: 40 TABLET ORAL at 06:00

## 2022-01-01 RX ADMIN — INSULIN LISPRO 6 UNITS: 100 INJECTION, SOLUTION INTRAVENOUS; SUBCUTANEOUS at 06:17

## 2022-01-01 RX ADMIN — Medication: at 08:20

## 2022-01-01 RX ADMIN — DILTIAZEM HYDROCHLORIDE 30 MG: 60 TABLET, FILM COATED ORAL at 11:31

## 2022-01-01 RX ADMIN — INSULIN GLARGINE 25 UNITS: 100 INJECTION, SOLUTION SUBCUTANEOUS at 21:39

## 2022-01-01 RX ADMIN — NAFCILLIN SODIUM 2000 MG: 2 INJECTION, POWDER, LYOPHILIZED, FOR SOLUTION INTRAMUSCULAR; INTRAVENOUS at 18:52

## 2022-01-01 RX ADMIN — FAMOTIDINE 20 MG: 10 INJECTION, SOLUTION INTRAVENOUS at 08:09

## 2022-01-01 RX ADMIN — HEPARIN SODIUM 5000 UNITS: 5000 INJECTION INTRAVENOUS; SUBCUTANEOUS at 21:15

## 2022-01-01 RX ADMIN — MIDAZOLAM HYDROCHLORIDE 2 MG: 1 INJECTION, SOLUTION INTRAMUSCULAR; INTRAVENOUS at 15:45

## 2022-01-01 RX ADMIN — TAMSULOSIN HYDROCHLORIDE 0.4 MG: 0.4 CAPSULE ORAL at 20:21

## 2022-01-01 RX ADMIN — INSULIN LISPRO 2 UNITS: 100 INJECTION, SOLUTION INTRAVENOUS; SUBCUTANEOUS at 20:22

## 2022-01-01 RX ADMIN — OXYCODONE HYDROCHLORIDE AND ACETAMINOPHEN 2 TABLET: 5; 325 TABLET ORAL at 23:31

## 2022-01-01 RX ADMIN — Medication: at 20:17

## 2022-01-01 RX ADMIN — Medication 2 MCG/KG/HR: at 06:46

## 2022-01-01 RX ADMIN — Medication 1.4 MCG/KG/HR: at 17:31

## 2022-01-01 RX ADMIN — TIZANIDINE 8 MG: 4 TABLET ORAL at 20:18

## 2022-01-01 RX ADMIN — Medication 800 MG: at 19:57

## 2022-01-01 RX ADMIN — INSULIN LISPRO 12 UNITS: 100 INJECTION, SOLUTION INTRAVENOUS; SUBCUTANEOUS at 08:36

## 2022-01-01 RX ADMIN — ACETAMINOPHEN 650 MG: 325 TABLET ORAL at 04:18

## 2022-01-01 RX ADMIN — SODIUM CHLORIDE, PRESERVATIVE FREE 10 ML: 5 INJECTION INTRAVENOUS at 21:28

## 2022-01-01 RX ADMIN — TAMSULOSIN HYDROCHLORIDE 0.4 MG: 0.4 CAPSULE ORAL at 20:33

## 2022-01-01 RX ADMIN — Medication 800 MG: at 08:45

## 2022-01-01 RX ADMIN — OXYCODONE 10 MG: 5 TABLET ORAL at 20:14

## 2022-01-01 RX ADMIN — ALTEPLASE 1 MG: 2.2 INJECTION, POWDER, LYOPHILIZED, FOR SOLUTION INTRAVENOUS at 18:54

## 2022-01-01 RX ADMIN — HEPARIN SODIUM 5000 UNITS: 5000 INJECTION INTRAVENOUS; SUBCUTANEOUS at 20:01

## 2022-01-01 RX ADMIN — ROCURONIUM BROMIDE 10 MG: 10 INJECTION, SOLUTION INTRAVENOUS at 13:47

## 2022-01-01 RX ADMIN — OLANZAPINE 5 MG: 5 TABLET, FILM COATED ORAL at 20:01

## 2022-01-01 RX ADMIN — INSULIN LISPRO 15 UNITS: 100 INJECTION, SOLUTION INTRAVENOUS; SUBCUTANEOUS at 08:59

## 2022-01-01 RX ADMIN — INSULIN LISPRO 3 UNITS: 100 INJECTION, SOLUTION INTRAVENOUS; SUBCUTANEOUS at 08:53

## 2022-01-01 RX ADMIN — OLANZAPINE 5 MG: 5 TABLET, FILM COATED ORAL at 21:03

## 2022-01-01 RX ADMIN — HEPARIN SODIUM 5000 UNITS: 5000 INJECTION INTRAVENOUS; SUBCUTANEOUS at 14:21

## 2022-01-01 RX ADMIN — Medication 800 MG: at 08:06

## 2022-01-01 RX ADMIN — INSULIN LISPRO 3 UNITS: 100 INJECTION, SOLUTION INTRAVENOUS; SUBCUTANEOUS at 20:55

## 2022-01-01 RX ADMIN — CALCIUM GLUCONATE 1000 MG: 98 INJECTION, SOLUTION INTRAVENOUS at 20:12

## 2022-01-01 RX ADMIN — SUCRALFATE 1 G: 1 TABLET ORAL at 16:47

## 2022-01-01 RX ADMIN — Medication: at 18:15

## 2022-01-01 RX ADMIN — PANTOPRAZOLE SODIUM 40 MG: 40 TABLET, DELAYED RELEASE ORAL at 09:00

## 2022-01-01 RX ADMIN — DILTIAZEM HYDROCHLORIDE 30 MG: 60 TABLET, FILM COATED ORAL at 18:23

## 2022-01-01 RX ADMIN — DOXYCYCLINE HYCLATE 100 MG: 100 TABLET, COATED ORAL at 20:18

## 2022-01-01 RX ADMIN — HYDROMORPHONE HYDROCHLORIDE 1 MG: 1 INJECTION, SOLUTION INTRAMUSCULAR; INTRAVENOUS; SUBCUTANEOUS at 22:39

## 2022-01-01 RX ADMIN — INSULIN LISPRO 3 UNITS: 100 INJECTION, SOLUTION INTRAVENOUS; SUBCUTANEOUS at 16:29

## 2022-01-01 RX ADMIN — HEPARIN SODIUM 5000 UNITS: 5000 INJECTION INTRAVENOUS; SUBCUTANEOUS at 05:16

## 2022-01-01 RX ADMIN — SODIUM CHLORIDE, PRESERVATIVE FREE 10 ML: 5 INJECTION INTRAVENOUS at 20:44

## 2022-01-01 RX ADMIN — PANTOPRAZOLE SODIUM 40 MG: 40 INJECTION, POWDER, FOR SOLUTION INTRAVENOUS at 08:04

## 2022-01-01 RX ADMIN — TIZANIDINE 4 MG: 4 TABLET ORAL at 21:49

## 2022-01-01 RX ADMIN — TIZANIDINE 8 MG: 4 TABLET ORAL at 20:53

## 2022-01-01 RX ADMIN — FENTANYL CITRATE 50 MCG: 50 INJECTION INTRAMUSCULAR; INTRAVENOUS at 04:12

## 2022-01-01 RX ADMIN — PANTOPRAZOLE SODIUM 40 MG: 40 TABLET, DELAYED RELEASE ORAL at 08:58

## 2022-01-01 RX ADMIN — SODIUM CHLORIDE, PRESERVATIVE FREE 10 ML: 5 INJECTION INTRAVENOUS at 08:40

## 2022-01-01 RX ADMIN — ACETAMINOPHEN 650 MG: 325 TABLET ORAL at 12:16

## 2022-01-01 RX ADMIN — METOCLOPRAMIDE 10 MG: 10 TABLET ORAL at 06:12

## 2022-01-01 RX ADMIN — OXYCODONE 7.5 MG: 15 TABLET ORAL at 06:07

## 2022-01-01 RX ADMIN — CARVEDILOL 12.5 MG: 6.25 TABLET, FILM COATED ORAL at 17:10

## 2022-01-01 RX ADMIN — PANTOPRAZOLE SODIUM 40 MG: 40 TABLET, DELAYED RELEASE ORAL at 20:49

## 2022-01-01 RX ADMIN — QUETIAPINE FUMARATE 25 MG: 25 TABLET ORAL at 07:49

## 2022-01-01 RX ADMIN — DILTIAZEM HYDROCHLORIDE 30 MG: 60 TABLET, FILM COATED ORAL at 05:00

## 2022-01-01 RX ADMIN — PROPOFOL 25 MCG/KG/MIN: 10 INJECTION, EMULSION INTRAVENOUS at 23:32

## 2022-01-01 RX ADMIN — SODIUM CHLORIDE 8 MG/HR: 9 INJECTION, SOLUTION INTRAVENOUS at 22:46

## 2022-01-01 RX ADMIN — HYDROMORPHONE HYDROCHLORIDE 2 MG: 2 TABLET ORAL at 09:53

## 2022-01-01 RX ADMIN — HYDROMORPHONE HYDROCHLORIDE 2 MG: 2 TABLET ORAL at 13:34

## 2022-01-01 RX ADMIN — DILTIAZEM HYDROCHLORIDE 30 MG: 60 TABLET, FILM COATED ORAL at 23:26

## 2022-01-01 RX ADMIN — AMPICILLIN SODIUM AND SULBACTAM SODIUM 3000 MG: 2; 1 INJECTION, POWDER, FOR SOLUTION INTRAMUSCULAR; INTRAVENOUS at 18:47

## 2022-01-01 RX ADMIN — LISINOPRIL 5 MG: 5 TABLET ORAL at 08:46

## 2022-01-01 RX ADMIN — LINEZOLID 600 MG: 600 TABLET, FILM COATED ORAL at 11:17

## 2022-01-01 RX ADMIN — INSULIN LISPRO 6 UNITS: 100 INJECTION, SOLUTION INTRAVENOUS; SUBCUTANEOUS at 20:32

## 2022-01-01 RX ADMIN — PANTOPRAZOLE SODIUM 40 MG: 40 INJECTION, POWDER, FOR SOLUTION INTRAVENOUS at 09:05

## 2022-01-01 RX ADMIN — OXYCODONE 10 MG: 5 TABLET ORAL at 21:29

## 2022-01-01 RX ADMIN — HEPARIN SODIUM 5000 UNITS: 5000 INJECTION INTRAVENOUS; SUBCUTANEOUS at 06:11

## 2022-01-01 RX ADMIN — MIDAZOLAM HYDROCHLORIDE 2 MG: 1 INJECTION, SOLUTION INTRAMUSCULAR; INTRAVENOUS at 17:10

## 2022-01-01 RX ADMIN — SODIUM CHLORIDE, PRESERVATIVE FREE 10 ML: 5 INJECTION INTRAVENOUS at 20:35

## 2022-01-01 RX ADMIN — Medication: at 19:48

## 2022-01-01 RX ADMIN — SODIUM CHLORIDE, PRESERVATIVE FREE 10 ML: 5 INJECTION INTRAVENOUS at 07:52

## 2022-01-01 RX ADMIN — HEPARIN SODIUM 1300 UNITS: 1000 INJECTION INTRAVENOUS; SUBCUTANEOUS at 18:33

## 2022-01-01 RX ADMIN — INSULIN LISPRO 3 UNITS: 100 INJECTION, SOLUTION INTRAVENOUS; SUBCUTANEOUS at 10:32

## 2022-01-01 RX ADMIN — Medication 1.6 MCG/KG/HR: at 19:21

## 2022-01-01 RX ADMIN — Medication 2000 MG: at 23:03

## 2022-01-01 RX ADMIN — METOCLOPRAMIDE 10 MG: 10 TABLET ORAL at 10:00

## 2022-01-01 RX ADMIN — METOCLOPRAMIDE 10 MG: 10 TABLET ORAL at 17:52

## 2022-01-01 RX ADMIN — Medication 1 TABLET: at 12:39

## 2022-01-01 RX ADMIN — HEPARIN SODIUM 5000 UNITS: 5000 INJECTION INTRAVENOUS; SUBCUTANEOUS at 15:08

## 2022-01-01 RX ADMIN — NAFCILLIN SODIUM 2000 MG: 2 INJECTION, POWDER, LYOPHILIZED, FOR SOLUTION INTRAMUSCULAR; INTRAVENOUS at 15:03

## 2022-01-01 RX ADMIN — FENTANYL CITRATE 50 MCG: 50 INJECTION INTRAMUSCULAR; INTRAVENOUS at 10:49

## 2022-01-01 RX ADMIN — METOPROLOL TARTRATE 5 MG: 5 INJECTION INTRAVENOUS at 11:58

## 2022-01-01 RX ADMIN — DOXYCYCLINE HYCLATE 100 MG: 100 TABLET, COATED ORAL at 08:04

## 2022-01-01 RX ADMIN — NIFEDIPINE 30 MG: 30 TABLET, FILM COATED, EXTENDED RELEASE ORAL at 09:38

## 2022-01-01 RX ADMIN — METOPROLOL TARTRATE 5 MG: 5 INJECTION INTRAVENOUS at 22:39

## 2022-01-01 RX ADMIN — QUETIAPINE FUMARATE 25 MG: 25 TABLET ORAL at 16:44

## 2022-01-01 RX ADMIN — METOPROLOL TARTRATE 5 MG: 5 INJECTION INTRAVENOUS at 16:30

## 2022-01-01 RX ADMIN — LIDOCAINE HYDROCHLORIDE 40 MG: 20 INJECTION, SOLUTION INFILTRATION; PERINEURAL at 12:33

## 2022-01-01 RX ADMIN — PANTOPRAZOLE SODIUM 40 MG: 40 TABLET, DELAYED RELEASE ORAL at 20:09

## 2022-01-01 RX ADMIN — HYDROMORPHONE HYDROCHLORIDE 2 MG: 2 TABLET ORAL at 08:50

## 2022-01-01 RX ADMIN — INSULIN LISPRO 6 UNITS: 100 INJECTION, SOLUTION INTRAVENOUS; SUBCUTANEOUS at 16:36

## 2022-01-01 RX ADMIN — INSULIN LISPRO 3 UNITS: 100 INJECTION, SOLUTION INTRAVENOUS; SUBCUTANEOUS at 10:06

## 2022-01-01 RX ADMIN — Medication 400 MG: at 08:26

## 2022-01-01 RX ADMIN — MIDAZOLAM HYDROCHLORIDE 2 MG: 2 INJECTION, SOLUTION INTRAMUSCULAR; INTRAVENOUS at 17:42

## 2022-01-01 RX ADMIN — SODIUM CHLORIDE, PRESERVATIVE FREE 10 ML: 5 INJECTION INTRAVENOUS at 09:09

## 2022-01-01 RX ADMIN — DOXYCYCLINE HYCLATE 100 MG: 100 TABLET, COATED ORAL at 20:11

## 2022-01-01 RX ADMIN — NAFCILLIN SODIUM 2000 MG: 2 INJECTION, POWDER, LYOPHILIZED, FOR SOLUTION INTRAMUSCULAR; INTRAVENOUS at 11:55

## 2022-01-01 RX ADMIN — SUCRALFATE 1 G: 1 TABLET ORAL at 06:33

## 2022-01-01 RX ADMIN — HYDROMORPHONE HYDROCHLORIDE 1 MG: 1 INJECTION, SOLUTION INTRAMUSCULAR; INTRAVENOUS; SUBCUTANEOUS at 04:28

## 2022-01-01 RX ADMIN — Medication 2 MCG/KG/HR: at 10:46

## 2022-01-01 RX ADMIN — INSULIN LISPRO 9 UNITS: 100 INJECTION, SOLUTION INTRAVENOUS; SUBCUTANEOUS at 11:33

## 2022-01-01 RX ADMIN — INSULIN LISPRO 6 UNITS: 100 INJECTION, SOLUTION INTRAVENOUS; SUBCUTANEOUS at 11:52

## 2022-01-01 RX ADMIN — SUCRALFATE 1 G: 1 TABLET ORAL at 13:15

## 2022-01-01 RX ADMIN — HEPARIN SODIUM 2600 UNITS: 1000 INJECTION INTRAVENOUS; SUBCUTANEOUS at 10:43

## 2022-01-01 RX ADMIN — METOCLOPRAMIDE 10 MG: 10 TABLET ORAL at 06:06

## 2022-01-01 RX ADMIN — ONDANSETRON 4 MG: 2 INJECTION INTRAMUSCULAR; INTRAVENOUS at 06:16

## 2022-01-01 RX ADMIN — HYDROMORPHONE HYDROCHLORIDE 1 MG: 1 INJECTION, SOLUTION INTRAMUSCULAR; INTRAVENOUS; SUBCUTANEOUS at 10:44

## 2022-01-01 RX ADMIN — OXYCODONE 10 MG: 5 TABLET ORAL at 01:44

## 2022-01-01 RX ADMIN — TIZANIDINE 8 MG: 4 TABLET ORAL at 09:04

## 2022-01-01 RX ADMIN — HYDROCORTISONE SODIUM SUCCINATE 50 MG: 100 INJECTION, POWDER, FOR SOLUTION INTRAMUSCULAR; INTRAVENOUS at 04:13

## 2022-01-01 RX ADMIN — HEPARIN SODIUM 5000 UNITS: 5000 INJECTION INTRAVENOUS; SUBCUTANEOUS at 22:36

## 2022-01-01 RX ADMIN — METOCLOPRAMIDE 10 MG: 10 TABLET ORAL at 20:06

## 2022-01-01 RX ADMIN — AMPICILLIN SODIUM AND SULBACTAM SODIUM 3000 MG: 2; 1 INJECTION, POWDER, FOR SOLUTION INTRAMUSCULAR; INTRAVENOUS at 17:40

## 2022-01-01 RX ADMIN — AMPICILLIN SODIUM AND SULBACTAM SODIUM 3000 MG: 2; 1 INJECTION, POWDER, FOR SOLUTION INTRAMUSCULAR; INTRAVENOUS at 08:06

## 2022-01-01 RX ADMIN — HYDROMORPHONE HYDROCHLORIDE 2 MG: 2 TABLET ORAL at 13:40

## 2022-01-01 RX ADMIN — Medication: at 20:56

## 2022-01-01 RX ADMIN — FUROSEMIDE 40 MG: 40 TABLET ORAL at 05:26

## 2022-01-01 RX ADMIN — OXYCODONE 10 MG: 5 TABLET ORAL at 02:44

## 2022-01-01 RX ADMIN — LISINOPRIL 5 MG: 5 TABLET ORAL at 07:51

## 2022-01-01 RX ADMIN — LINEZOLID 600 MG: 600 INJECTION, SOLUTION INTRAVENOUS at 17:42

## 2022-01-01 RX ADMIN — FENTANYL CITRATE 50 MCG: 50 INJECTION INTRAMUSCULAR; INTRAVENOUS at 12:10

## 2022-01-01 RX ADMIN — PROPOFOL INJECTABLE EMULSION 49.95 MCG/KG/MIN: 10 INJECTION, EMULSION INTRAVENOUS at 01:24

## 2022-01-01 RX ADMIN — Medication: at 09:51

## 2022-01-01 RX ADMIN — GABAPENTIN 400 MG: 400 CAPSULE ORAL at 12:47

## 2022-01-01 RX ADMIN — METOPROLOL TARTRATE 25 MG: 25 TABLET, FILM COATED ORAL at 20:01

## 2022-01-01 RX ADMIN — Medication: at 11:42

## 2022-01-01 RX ADMIN — CHLORHEXIDINE GLUCONATE 0.12% ORAL RINSE 15 ML: 1.2 LIQUID ORAL at 20:22

## 2022-01-01 RX ADMIN — METOCLOPRAMIDE 10 MG: 10 TABLET ORAL at 16:46

## 2022-01-01 RX ADMIN — OLANZAPINE 5 MG: 5 TABLET, FILM COATED ORAL at 21:09

## 2022-01-01 RX ADMIN — OLANZAPINE 5 MG: 5 TABLET, FILM COATED ORAL at 20:11

## 2022-01-01 RX ADMIN — METOCLOPRAMIDE 10 MG: 10 TABLET ORAL at 05:41

## 2022-01-01 RX ADMIN — HYDROMORPHONE HYDROCHLORIDE 1 MG: 1 INJECTION, SOLUTION INTRAMUSCULAR; INTRAVENOUS; SUBCUTANEOUS at 05:10

## 2022-01-01 RX ADMIN — INSULIN LISPRO 4 UNITS: 100 INJECTION, SOLUTION INTRAVENOUS; SUBCUTANEOUS at 18:00

## 2022-01-01 RX ADMIN — Medication 800 MG: at 20:18

## 2022-01-01 RX ADMIN — Medication 2 MCG/KG/HR: at 20:07

## 2022-01-01 RX ADMIN — MIDAZOLAM HYDROCHLORIDE 2 MG: 2 INJECTION, SOLUTION INTRAMUSCULAR; INTRAVENOUS at 18:42

## 2022-01-01 RX ADMIN — HEPARIN SODIUM 5000 UNITS: 5000 INJECTION INTRAVENOUS; SUBCUTANEOUS at 20:00

## 2022-01-01 RX ADMIN — HYDROMORPHONE HYDROCHLORIDE 1 MG: 1 INJECTION, SOLUTION INTRAMUSCULAR; INTRAVENOUS; SUBCUTANEOUS at 11:30

## 2022-01-01 RX ADMIN — SUCRALFATE 1 G: 1 TABLET ORAL at 19:05

## 2022-01-01 RX ADMIN — CHLORHEXIDINE GLUCONATE 0.12% ORAL RINSE 15 ML: 1.2 LIQUID ORAL at 22:18

## 2022-01-01 RX ADMIN — HEPARIN SODIUM 5000 UNITS: 5000 INJECTION INTRAVENOUS; SUBCUTANEOUS at 21:14

## 2022-01-01 RX ADMIN — Medication 125 MCG/HR: at 13:07

## 2022-01-01 RX ADMIN — HYDROMORPHONE HYDROCHLORIDE 1 MG: 1 INJECTION, SOLUTION INTRAMUSCULAR; INTRAVENOUS; SUBCUTANEOUS at 13:06

## 2022-01-01 RX ADMIN — DILTIAZEM HYDROCHLORIDE 30 MG: 60 TABLET, FILM COATED ORAL at 00:23

## 2022-01-01 RX ADMIN — HEPARIN SODIUM 5000 UNITS: 5000 INJECTION INTRAVENOUS; SUBCUTANEOUS at 13:50

## 2022-01-01 RX ADMIN — NIFEDIPINE 30 MG: 30 TABLET, FILM COATED, EXTENDED RELEASE ORAL at 09:06

## 2022-01-01 RX ADMIN — METOCLOPRAMIDE 10 MG: 10 TABLET ORAL at 21:04

## 2022-01-01 RX ADMIN — Medication: at 20:39

## 2022-01-01 RX ADMIN — TRAZODONE HYDROCHLORIDE 50 MG: 50 TABLET ORAL at 20:24

## 2022-01-01 RX ADMIN — TIZANIDINE 8 MG: 4 TABLET ORAL at 20:48

## 2022-01-01 RX ADMIN — HEPARIN SODIUM 5000 UNITS: 5000 INJECTION INTRAVENOUS; SUBCUTANEOUS at 20:43

## 2022-01-01 RX ADMIN — SODIUM CHLORIDE 20.41 UNITS/HR: 9 INJECTION, SOLUTION INTRAVENOUS at 02:02

## 2022-01-01 RX ADMIN — Medication 800 MG: at 14:02

## 2022-01-01 RX ADMIN — METOPROLOL TARTRATE 5 MG: 5 INJECTION INTRAVENOUS at 22:38

## 2022-01-01 RX ADMIN — Medication: at 21:58

## 2022-01-01 RX ADMIN — OXYCODONE 10 MG: 5 TABLET ORAL at 21:56

## 2022-01-01 RX ADMIN — OXYCODONE 5 MG: 5 TABLET ORAL at 06:12

## 2022-01-01 RX ADMIN — Medication 800 MG: at 09:11

## 2022-01-01 RX ADMIN — MIDAZOLAM HYDROCHLORIDE 2 MG: 2 INJECTION, SOLUTION INTRAMUSCULAR; INTRAVENOUS at 15:13

## 2022-01-01 RX ADMIN — LINEZOLID 600 MG: 600 INJECTION, SOLUTION INTRAVENOUS at 08:07

## 2022-01-01 RX ADMIN — OLANZAPINE 5 MG: 5 TABLET, FILM COATED ORAL at 20:05

## 2022-01-01 RX ADMIN — HEPARIN SODIUM 5000 UNITS: 5000 INJECTION INTRAVENOUS; SUBCUTANEOUS at 20:36

## 2022-01-01 RX ADMIN — HEPARIN SODIUM 5000 UNITS: 5000 INJECTION INTRAVENOUS; SUBCUTANEOUS at 21:46

## 2022-01-01 RX ADMIN — INSULIN LISPRO 3 UNITS: 100 INJECTION, SOLUTION INTRAVENOUS; SUBCUTANEOUS at 06:17

## 2022-01-01 RX ADMIN — METOPROLOL TARTRATE 5 MG: 5 INJECTION INTRAVENOUS at 11:30

## 2022-01-01 RX ADMIN — PAROXETINE HYDROCHLORIDE 20 MG: 20 TABLET, FILM COATED ORAL at 08:28

## 2022-01-01 RX ADMIN — TAMSULOSIN HYDROCHLORIDE 0.4 MG: 0.4 CAPSULE ORAL at 20:11

## 2022-01-01 RX ADMIN — HEPARIN SODIUM 5000 UNITS: 5000 INJECTION INTRAVENOUS; SUBCUTANEOUS at 20:56

## 2022-01-01 RX ADMIN — CARVEDILOL 12.5 MG: 6.25 TABLET, FILM COATED ORAL at 17:08

## 2022-01-01 RX ADMIN — HEPARIN SODIUM 5000 UNITS: 5000 INJECTION INTRAVENOUS; SUBCUTANEOUS at 14:10

## 2022-01-01 RX ADMIN — GABAPENTIN 100 MG: 100 CAPSULE ORAL at 20:18

## 2022-01-01 RX ADMIN — NOREPINEPHRINE BITARTRATE 30 MCG/MIN: 1 INJECTION, SOLUTION, CONCENTRATE INTRAVENOUS at 18:59

## 2022-01-01 RX ADMIN — SUCRALFATE 1 G: 1 TABLET ORAL at 13:18

## 2022-01-01 RX ADMIN — SODIUM CHLORIDE: 9 INJECTION, SOLUTION INTRAVENOUS at 10:32

## 2022-01-01 RX ADMIN — INSULIN GLARGINE 25 UNITS: 100 INJECTION, SOLUTION SUBCUTANEOUS at 22:16

## 2022-01-01 RX ADMIN — PANTOPRAZOLE SODIUM 40 MG: 40 TABLET, DELAYED RELEASE ORAL at 21:29

## 2022-01-01 RX ADMIN — INSULIN LISPRO 12 UNITS: 100 INJECTION, SOLUTION INTRAVENOUS; SUBCUTANEOUS at 20:21

## 2022-01-01 RX ADMIN — INSULIN HUMAN 10 UNITS: 100 INJECTION, SOLUTION PARENTERAL at 17:16

## 2022-01-01 RX ADMIN — SODIUM CHLORIDE, PRESERVATIVE FREE 10 ML: 5 INJECTION INTRAVENOUS at 20:09

## 2022-01-01 RX ADMIN — Medication 800 MG: at 07:51

## 2022-01-01 RX ADMIN — METOCLOPRAMIDE 10 MG: 10 TABLET ORAL at 05:31

## 2022-01-01 RX ADMIN — OXYCODONE 5 MG: 5 TABLET ORAL at 12:20

## 2022-01-01 RX ADMIN — PANTOPRAZOLE SODIUM 40 MG: 40 INJECTION, POWDER, FOR SOLUTION INTRAVENOUS at 20:32

## 2022-01-01 RX ADMIN — OXYCODONE HYDROCHLORIDE AND ACETAMINOPHEN 1 TABLET: 5; 325 TABLET ORAL at 11:37

## 2022-01-01 RX ADMIN — FENTANYL CITRATE 50 MCG: 50 INJECTION INTRAMUSCULAR; INTRAVENOUS at 15:28

## 2022-01-01 RX ADMIN — INSULIN GLARGINE 40 UNITS: 100 INJECTION, SOLUTION SUBCUTANEOUS at 08:35

## 2022-01-01 RX ADMIN — METOPROLOL SUCCINATE 25 MG: 25 TABLET, EXTENDED RELEASE ORAL at 09:41

## 2022-01-01 RX ADMIN — SODIUM CHLORIDE, PRESERVATIVE FREE 10 ML: 5 INJECTION INTRAVENOUS at 20:42

## 2022-01-01 RX ADMIN — ROCURONIUM BROMIDE 10 MG: 10 INJECTION, SOLUTION INTRAVENOUS at 14:26

## 2022-01-01 RX ADMIN — INSULIN LISPRO 15 UNITS: 100 INJECTION, SOLUTION INTRAVENOUS; SUBCUTANEOUS at 00:09

## 2022-01-01 RX ADMIN — CEFAZOLIN SODIUM 1000 MG: 1 INJECTION, POWDER, FOR SOLUTION INTRAMUSCULAR; INTRAVENOUS at 12:09

## 2022-01-01 RX ADMIN — PROPOFOL INJECTABLE EMULSION 20 MCG/KG/MIN: 10 INJECTION, EMULSION INTRAVENOUS at 11:55

## 2022-01-01 RX ADMIN — SODIUM CHLORIDE 11.34 UNITS/HR: 9 INJECTION, SOLUTION INTRAVENOUS at 08:39

## 2022-01-01 RX ADMIN — CHLORHEXIDINE GLUCONATE 0.12% ORAL RINSE 15 ML: 1.2 LIQUID ORAL at 20:44

## 2022-01-01 RX ADMIN — GABAPENTIN 400 MG: 400 CAPSULE ORAL at 08:46

## 2022-01-01 RX ADMIN — INSULIN LISPRO 3 UNITS: 100 INJECTION, SOLUTION INTRAVENOUS; SUBCUTANEOUS at 05:21

## 2022-01-01 RX ADMIN — TIZANIDINE 4 MG: 4 TABLET ORAL at 09:01

## 2022-01-01 RX ADMIN — METOCLOPRAMIDE 10 MG: 10 TABLET ORAL at 10:16

## 2022-01-01 RX ADMIN — CARVEDILOL 12.5 MG: 6.25 TABLET, FILM COATED ORAL at 17:11

## 2022-01-01 RX ADMIN — INSULIN LISPRO 6 UNITS: 100 INJECTION, SOLUTION INTRAVENOUS; SUBCUTANEOUS at 16:42

## 2022-01-01 RX ADMIN — TIZANIDINE 4 MG: 4 TABLET ORAL at 09:58

## 2022-01-01 RX ADMIN — Medication 400 MG: at 08:28

## 2022-01-01 RX ADMIN — METOCLOPRAMIDE 10 MG: 10 TABLET ORAL at 20:05

## 2022-01-01 RX ADMIN — INSULIN LISPRO 2 UNITS: 100 INJECTION, SOLUTION INTRAVENOUS; SUBCUTANEOUS at 21:04

## 2022-01-01 RX ADMIN — INSULIN LISPRO 3 UNITS: 100 INJECTION, SOLUTION INTRAVENOUS; SUBCUTANEOUS at 17:37

## 2022-01-01 RX ADMIN — Medication 2 MCG/KG/HR: at 01:39

## 2022-01-01 RX ADMIN — DOXYCYCLINE HYCLATE 100 MG: 100 TABLET, COATED ORAL at 09:40

## 2022-01-01 RX ADMIN — HYDROMORPHONE HYDROCHLORIDE 1 MG: 1 INJECTION, SOLUTION INTRAMUSCULAR; INTRAVENOUS; SUBCUTANEOUS at 14:26

## 2022-01-01 RX ADMIN — METOCLOPRAMIDE 10 MG: 10 TABLET ORAL at 15:14

## 2022-01-01 RX ADMIN — INSULIN LISPRO 4 UNITS: 100 INJECTION, SOLUTION INTRAVENOUS; SUBCUTANEOUS at 12:41

## 2022-01-01 RX ADMIN — Medication: at 09:15

## 2022-01-01 RX ADMIN — SUCRALFATE 1 G: 1 TABLET ORAL at 20:32

## 2022-01-01 RX ADMIN — PROPOFOL 330 MG: 10 INJECTION, EMULSION INTRAVENOUS at 11:32

## 2022-01-01 RX ADMIN — TIZANIDINE 8 MG: 4 TABLET ORAL at 19:39

## 2022-01-01 RX ADMIN — PROPOFOL 250 MG: 10 INJECTION, EMULSION INTRAVENOUS at 12:20

## 2022-01-01 RX ADMIN — HEPARIN SODIUM 5000 UNITS: 5000 INJECTION INTRAVENOUS; SUBCUTANEOUS at 14:16

## 2022-01-01 RX ADMIN — HEPARIN SODIUM 5000 UNITS: 5000 INJECTION INTRAVENOUS; SUBCUTANEOUS at 21:04

## 2022-01-01 RX ADMIN — GABAPENTIN 400 MG: 400 CAPSULE ORAL at 20:01

## 2022-01-01 RX ADMIN — HEPARIN SODIUM 5000 UNITS: 5000 INJECTION INTRAVENOUS; SUBCUTANEOUS at 05:48

## 2022-01-01 RX ADMIN — NAFCILLIN SODIUM 2000 MG: 2 INJECTION, POWDER, LYOPHILIZED, FOR SOLUTION INTRAMUSCULAR; INTRAVENOUS at 06:58

## 2022-01-01 RX ADMIN — INSULIN GLARGINE 20 UNITS: 100 INJECTION, SOLUTION SUBCUTANEOUS at 20:48

## 2022-01-01 RX ADMIN — QUETIAPINE FUMARATE 25 MG: 25 TABLET ORAL at 08:07

## 2022-01-01 RX ADMIN — SODIUM CHLORIDE, PRESERVATIVE FREE 10 ML: 5 INJECTION INTRAVENOUS at 22:15

## 2022-01-01 RX ADMIN — INSULIN LISPRO 3 UNITS: 100 INJECTION, SOLUTION INTRAVENOUS; SUBCUTANEOUS at 08:25

## 2022-01-01 RX ADMIN — Medication 1.8 MCG/KG/HR: at 02:18

## 2022-01-01 RX ADMIN — INSULIN LISPRO 6 UNITS: 100 INJECTION, SOLUTION INTRAVENOUS; SUBCUTANEOUS at 11:33

## 2022-01-01 RX ADMIN — SODIUM CHLORIDE 9.2 UNITS/HR: 9 INJECTION, SOLUTION INTRAVENOUS at 03:04

## 2022-01-01 RX ADMIN — FLUTICASONE PROPIONATE 2 SPRAY: 50 SPRAY, METERED NASAL at 20:04

## 2022-01-01 RX ADMIN — METOCLOPRAMIDE 10 MG: 10 TABLET ORAL at 16:43

## 2022-01-01 RX ADMIN — ONDANSETRON HYDROCHLORIDE 4 MG: 2 INJECTION, SOLUTION INTRAMUSCULAR; INTRAVENOUS at 18:11

## 2022-01-01 RX ADMIN — METOPROLOL TARTRATE 12.5 MG: 25 TABLET, FILM COATED ORAL at 08:35

## 2022-01-01 RX ADMIN — OXYCODONE HYDROCHLORIDE AND ACETAMINOPHEN 1 TABLET: 5; 325 TABLET ORAL at 00:23

## 2022-01-01 RX ADMIN — QUETIAPINE FUMARATE 25 MG: 25 TABLET ORAL at 16:27

## 2022-01-01 RX ADMIN — Medication 2 MCG/KG/HR: at 03:09

## 2022-01-01 RX ADMIN — LISINOPRIL 5 MG: 5 TABLET ORAL at 08:49

## 2022-01-01 RX ADMIN — FLUTICASONE PROPIONATE 2 SPRAY: 50 SPRAY, METERED NASAL at 20:25

## 2022-01-01 RX ADMIN — CHLORHEXIDINE GLUCONATE 0.12% ORAL RINSE 15 ML: 1.2 LIQUID ORAL at 08:15

## 2022-01-01 RX ADMIN — CHLORHEXIDINE GLUCONATE 0.12% ORAL RINSE 15 ML: 1.2 LIQUID ORAL at 08:35

## 2022-01-01 RX ADMIN — HYDROMORPHONE HYDROCHLORIDE 1 MG: 1 INJECTION, SOLUTION INTRAMUSCULAR; INTRAVENOUS; SUBCUTANEOUS at 00:04

## 2022-01-01 RX ADMIN — ALBUMIN (HUMAN) 12.5 G: 0.25 INJECTION, SOLUTION INTRAVENOUS at 09:09

## 2022-01-01 RX ADMIN — OXYCODONE 10 MG: 5 TABLET ORAL at 03:54

## 2022-01-01 RX ADMIN — METOCLOPRAMIDE 10 MG: 10 TABLET ORAL at 16:27

## 2022-01-01 RX ADMIN — CALCIUM GLUCONATE 1000 MG: 98 INJECTION, SOLUTION INTRAVENOUS at 03:33

## 2022-01-01 RX ADMIN — QUETIAPINE FUMARATE 25 MG: 25 TABLET ORAL at 17:29

## 2022-01-01 RX ADMIN — CHLORHEXIDINE GLUCONATE 0.12% ORAL RINSE 15 ML: 1.2 LIQUID ORAL at 08:01

## 2022-01-01 RX ADMIN — NAFCILLIN SODIUM 2000 MG: 2 INJECTION, POWDER, LYOPHILIZED, FOR SOLUTION INTRAMUSCULAR; INTRAVENOUS at 02:55

## 2022-01-01 RX ADMIN — METOCLOPRAMIDE 10 MG: 10 TABLET ORAL at 09:06

## 2022-01-01 RX ADMIN — Medication 800 MG: at 09:00

## 2022-01-01 RX ADMIN — SUCRALFATE 1 G: 1 TABLET ORAL at 05:45

## 2022-01-01 RX ADMIN — CARVEDILOL 12.5 MG: 6.25 TABLET, FILM COATED ORAL at 16:44

## 2022-01-01 RX ADMIN — PROPOFOL INJECTABLE EMULSION 20 MCG/KG/MIN: 10 INJECTION, EMULSION INTRAVENOUS at 18:37

## 2022-01-01 RX ADMIN — INSULIN LISPRO 2 UNITS: 100 INJECTION, SOLUTION INTRAVENOUS; SUBCUTANEOUS at 21:15

## 2022-01-01 RX ADMIN — DILTIAZEM HYDROCHLORIDE 30 MG: 60 TABLET, FILM COATED ORAL at 17:08

## 2022-01-01 RX ADMIN — Medication 800 MG: at 15:14

## 2022-01-01 RX ADMIN — GABAPENTIN 400 MG: 400 CAPSULE ORAL at 14:54

## 2022-01-01 RX ADMIN — Medication 2 MCG/KG/HR: at 12:49

## 2022-01-01 RX ADMIN — PROPOFOL 10 MCG/KG/MIN: 10 INJECTION, EMULSION INTRAVENOUS at 16:59

## 2022-01-01 RX ADMIN — Medication 800 MG: at 09:59

## 2022-01-01 RX ADMIN — PAROXETINE HYDROCHLORIDE 10 MG: 20 TABLET, FILM COATED ORAL at 10:05

## 2022-01-01 RX ADMIN — Medication: at 20:05

## 2022-01-01 RX ADMIN — INSULIN LISPRO 2 UNITS: 100 INJECTION, SOLUTION INTRAVENOUS; SUBCUTANEOUS at 20:21

## 2022-01-01 RX ADMIN — METOPROLOL SUCCINATE 50 MG: 50 TABLET, EXTENDED RELEASE ORAL at 22:13

## 2022-01-01 RX ADMIN — INSULIN LISPRO 3 UNITS: 100 INJECTION, SOLUTION INTRAVENOUS; SUBCUTANEOUS at 16:27

## 2022-01-01 RX ADMIN — METOPROLOL SUCCINATE 50 MG: 50 TABLET, EXTENDED RELEASE ORAL at 21:56

## 2022-01-01 RX ADMIN — NAFCILLIN SODIUM 2000 MG: 2 INJECTION, POWDER, LYOPHILIZED, FOR SOLUTION INTRAMUSCULAR; INTRAVENOUS at 22:30

## 2022-01-01 RX ADMIN — DOXYCYCLINE HYCLATE 100 MG: 100 TABLET, COATED ORAL at 08:50

## 2022-01-01 RX ADMIN — HEPARIN SODIUM 5000 UNITS: 5000 INJECTION INTRAVENOUS; SUBCUTANEOUS at 13:54

## 2022-01-01 RX ADMIN — DILTIAZEM HYDROCHLORIDE 30 MG: 60 TABLET, FILM COATED ORAL at 17:09

## 2022-01-01 RX ADMIN — Medication 1.6 MCG/KG/HR: at 17:25

## 2022-01-01 RX ADMIN — Medication 400 MG: at 21:10

## 2022-01-01 RX ADMIN — AMPICILLIN SODIUM AND SULBACTAM SODIUM 3000 MG: 2; 1 INJECTION, POWDER, FOR SOLUTION INTRAMUSCULAR; INTRAVENOUS at 05:00

## 2022-01-01 RX ADMIN — OXYCODONE 10 MG: 5 TABLET ORAL at 03:39

## 2022-01-01 RX ADMIN — AMPICILLIN SODIUM AND SULBACTAM SODIUM 3000 MG: 2; 1 INJECTION, POWDER, FOR SOLUTION INTRAMUSCULAR; INTRAVENOUS at 17:42

## 2022-01-01 RX ADMIN — SODIUM CHLORIDE 3.18 UNITS/HR: 9 INJECTION, SOLUTION INTRAVENOUS at 12:19

## 2022-01-01 RX ADMIN — QUETIAPINE FUMARATE 25 MG: 25 TABLET ORAL at 16:29

## 2022-01-01 RX ADMIN — INSULIN LISPRO 4 UNITS: 100 INJECTION, SOLUTION INTRAVENOUS; SUBCUTANEOUS at 17:06

## 2022-01-01 RX ADMIN — PAROXETINE HYDROCHLORIDE 20 MG: 20 TABLET, FILM COATED ORAL at 09:27

## 2022-01-01 RX ADMIN — INSULIN LISPRO 4 UNITS: 100 INJECTION, SOLUTION INTRAVENOUS; SUBCUTANEOUS at 11:47

## 2022-01-01 RX ADMIN — HYDROMORPHONE HYDROCHLORIDE 1 MG: 1 INJECTION, SOLUTION INTRAMUSCULAR; INTRAVENOUS; SUBCUTANEOUS at 14:00

## 2022-01-01 RX ADMIN — Medication 2 MCG/KG/HR: at 21:06

## 2022-01-01 RX ADMIN — DILTIAZEM HYDROCHLORIDE 30 MG: 60 TABLET, FILM COATED ORAL at 05:28

## 2022-01-01 RX ADMIN — INSULIN LISPRO 6 UNITS: 100 INJECTION, SOLUTION INTRAVENOUS; SUBCUTANEOUS at 16:33

## 2022-01-01 RX ADMIN — OXYCODONE 10 MG: 5 TABLET ORAL at 15:41

## 2022-01-01 RX ADMIN — HYDROMORPHONE HYDROCHLORIDE 1 MG: 1 INJECTION, SOLUTION INTRAMUSCULAR; INTRAVENOUS; SUBCUTANEOUS at 16:05

## 2022-01-01 RX ADMIN — TRAZODONE HYDROCHLORIDE 50 MG: 50 TABLET ORAL at 20:53

## 2022-01-01 RX ADMIN — INSULIN LISPRO 6 UNITS: 100 INJECTION, SOLUTION INTRAVENOUS; SUBCUTANEOUS at 20:21

## 2022-01-01 RX ADMIN — QUETIAPINE FUMARATE 25 MG: 25 TABLET ORAL at 02:58

## 2022-01-01 RX ADMIN — METOCLOPRAMIDE 10 MG: 10 TABLET ORAL at 06:04

## 2022-01-01 RX ADMIN — FLUTICASONE PROPIONATE 2 SPRAY: 50 SPRAY, METERED NASAL at 20:48

## 2022-01-01 RX ADMIN — INSULIN LISPRO 3 UNITS: 100 INJECTION, SOLUTION INTRAVENOUS; SUBCUTANEOUS at 12:22

## 2022-01-01 RX ADMIN — CARVEDILOL 12.5 MG: 6.25 TABLET, FILM COATED ORAL at 07:49

## 2022-01-01 RX ADMIN — PROPOFOL 20 MCG/KG/MIN: 10 INJECTION, EMULSION INTRAVENOUS at 21:01

## 2022-01-01 RX ADMIN — INSULIN LISPRO 6 UNITS: 100 INJECTION, SOLUTION INTRAVENOUS; SUBCUTANEOUS at 10:53

## 2022-01-01 RX ADMIN — EPOETIN ALFA-EPBX 10000 UNITS: 10000 INJECTION, SOLUTION INTRAVENOUS; SUBCUTANEOUS at 12:07

## 2022-01-01 RX ADMIN — SODIUM CHLORIDE: 9 INJECTION, SOLUTION INTRAVENOUS at 23:43

## 2022-01-01 RX ADMIN — Medication: at 21:53

## 2022-01-01 RX ADMIN — Medication 125 MCG/HR: at 08:40

## 2022-01-01 RX ADMIN — PANTOPRAZOLE SODIUM 40 MG: 40 INJECTION, POWDER, FOR SOLUTION INTRAVENOUS at 20:43

## 2022-01-01 RX ADMIN — INSULIN LISPRO 3 UNITS: 100 INJECTION, SOLUTION INTRAVENOUS; SUBCUTANEOUS at 12:41

## 2022-01-01 RX ADMIN — METOPROLOL TARTRATE 12.5 MG: 25 TABLET, FILM COATED ORAL at 20:52

## 2022-01-01 RX ADMIN — DILTIAZEM HYDROCHLORIDE 30 MG: 60 TABLET, FILM COATED ORAL at 00:12

## 2022-01-01 RX ADMIN — Medication 1 TABLET: at 13:57

## 2022-01-01 RX ADMIN — Medication: at 20:15

## 2022-01-01 RX ADMIN — PROPOFOL INJECTABLE EMULSION 20 MCG/KG/MIN: 10 INJECTION, EMULSION INTRAVENOUS at 20:16

## 2022-01-01 RX ADMIN — AMPICILLIN SODIUM AND SULBACTAM SODIUM 3000 MG: 2; 1 INJECTION, POWDER, FOR SOLUTION INTRAMUSCULAR; INTRAVENOUS at 16:40

## 2022-01-01 RX ADMIN — GABAPENTIN 200 MG: 100 CAPSULE ORAL at 20:24

## 2022-01-01 RX ADMIN — PANTOPRAZOLE SODIUM 40 MG: 40 INJECTION, POWDER, FOR SOLUTION INTRAVENOUS at 20:16

## 2022-01-01 RX ADMIN — DILTIAZEM HYDROCHLORIDE 15 MG/HR: 5 INJECTION, SOLUTION INTRAVENOUS at 23:27

## 2022-01-01 RX ADMIN — HYDROCORTISONE SODIUM SUCCINATE 50 MG: 100 INJECTION, POWDER, FOR SOLUTION INTRAMUSCULAR; INTRAVENOUS at 08:01

## 2022-01-01 RX ADMIN — METOPROLOL TARTRATE 25 MG: 25 TABLET, FILM COATED ORAL at 08:05

## 2022-01-01 RX ADMIN — PAROXETINE HYDROCHLORIDE 20 MG: 20 TABLET, FILM COATED ORAL at 09:06

## 2022-01-01 RX ADMIN — FAMOTIDINE 20 MG: 10 INJECTION, SOLUTION INTRAVENOUS at 07:51

## 2022-01-01 RX ADMIN — HYDROMORPHONE HYDROCHLORIDE 2 MG: 2 TABLET ORAL at 21:04

## 2022-01-01 RX ADMIN — INSULIN GLARGINE 25 UNITS: 100 INJECTION, SOLUTION SUBCUTANEOUS at 20:00

## 2022-01-01 RX ADMIN — DOXYCYCLINE HYCLATE 100 MG: 100 TABLET, COATED ORAL at 08:53

## 2022-01-01 RX ADMIN — METOCLOPRAMIDE 10 MG: 10 TABLET ORAL at 06:16

## 2022-01-01 RX ADMIN — OXYCODONE 10 MG: 5 TABLET ORAL at 10:14

## 2022-01-01 RX ADMIN — INSULIN LISPRO 6 UNITS: 100 INJECTION, SOLUTION INTRAVENOUS; SUBCUTANEOUS at 20:18

## 2022-01-01 RX ADMIN — METOPROLOL TARTRATE 5 MG: 5 INJECTION INTRAVENOUS at 05:00

## 2022-01-01 RX ADMIN — METOPROLOL TARTRATE 25 MG: 25 TABLET, FILM COATED ORAL at 08:50

## 2022-01-01 RX ADMIN — INSULIN LISPRO 3 UNITS: 100 INJECTION, SOLUTION INTRAVENOUS; SUBCUTANEOUS at 20:27

## 2022-01-01 RX ADMIN — Medication 0.6 MCG/KG/HR: at 18:15

## 2022-01-01 RX ADMIN — CEFEPIME HYDROCHLORIDE 1000 MG: 1 INJECTION, POWDER, FOR SOLUTION INTRAMUSCULAR; INTRAVENOUS at 11:06

## 2022-01-01 RX ADMIN — PANTOPRAZOLE SODIUM 40 MG: 40 TABLET, DELAYED RELEASE ORAL at 09:11

## 2022-01-01 RX ADMIN — Medication 125 MCG/HR: at 02:45

## 2022-01-01 RX ADMIN — OXYCODONE 10 MG: 5 TABLET ORAL at 15:04

## 2022-01-01 RX ADMIN — OXYCODONE 10 MG: 5 TABLET ORAL at 13:53

## 2022-01-01 RX ADMIN — GABAPENTIN 200 MG: 100 CAPSULE ORAL at 13:16

## 2022-01-01 RX ADMIN — METOCLOPRAMIDE 10 MG: 10 TABLET ORAL at 21:18

## 2022-01-01 RX ADMIN — SODIUM CHLORIDE: 9 INJECTION, SOLUTION INTRAVENOUS at 12:15

## 2022-01-01 RX ADMIN — OXYCODONE 7.5 MG: 15 TABLET ORAL at 20:59

## 2022-01-01 RX ADMIN — METOCLOPRAMIDE 10 MG: 10 TABLET ORAL at 18:00

## 2022-01-01 RX ADMIN — Medication 800 MG: at 13:43

## 2022-01-01 RX ADMIN — INSULIN LISPRO 3 UNITS: 100 INJECTION, SOLUTION INTRAVENOUS; SUBCUTANEOUS at 16:31

## 2022-01-01 RX ADMIN — Medication 1.5 MCG/KG/HR: at 02:01

## 2022-01-01 RX ADMIN — INSULIN GLARGINE 30 UNITS: 100 INJECTION, SOLUTION SUBCUTANEOUS at 20:35

## 2022-01-01 RX ADMIN — SUCRALFATE 1 G: 1 TABLET ORAL at 16:44

## 2022-01-01 RX ADMIN — INSULIN LISPRO 15 UNITS: 100 INJECTION, SOLUTION INTRAVENOUS; SUBCUTANEOUS at 00:13

## 2022-01-01 RX ADMIN — PANTOPRAZOLE SODIUM 40 MG: 40 TABLET, DELAYED RELEASE ORAL at 05:41

## 2022-01-01 RX ADMIN — EPOETIN ALFA-EPBX 5000 UNITS: 10000 INJECTION, SOLUTION INTRAVENOUS; SUBCUTANEOUS at 16:30

## 2022-01-01 RX ADMIN — Medication 2 MCG/KG/HR: at 19:34

## 2022-01-01 RX ADMIN — PANTOPRAZOLE SODIUM 40 MG: 40 TABLET, DELAYED RELEASE ORAL at 21:28

## 2022-01-01 RX ADMIN — SUCRALFATE 1 G: 1 TABLET ORAL at 06:07

## 2022-01-01 RX ADMIN — Medication: at 10:49

## 2022-01-01 RX ADMIN — CALCIUM GLUCONATE 1000 MG: 98 INJECTION, SOLUTION INTRAVENOUS at 17:07

## 2022-01-01 RX ADMIN — DESMOPRESSIN ACETATE 33.4 MCG: 4 SOLUTION INTRAVENOUS at 16:20

## 2022-01-01 RX ADMIN — CHLORHEXIDINE GLUCONATE 0.12% ORAL RINSE 15 ML: 1.2 LIQUID ORAL at 20:58

## 2022-01-01 RX ADMIN — SUCRALFATE 1 G: 1 TABLET ORAL at 05:51

## 2022-01-01 RX ADMIN — TRAZODONE HYDROCHLORIDE 50 MG: 50 TABLET ORAL at 20:01

## 2022-01-01 RX ADMIN — Medication 800 MG: at 08:37

## 2022-01-01 RX ADMIN — NIFEDIPINE 30 MG: 30 TABLET, FILM COATED, EXTENDED RELEASE ORAL at 09:43

## 2022-01-01 RX ADMIN — METOCLOPRAMIDE 10 MG: 10 TABLET ORAL at 12:15

## 2022-01-01 RX ADMIN — GABAPENTIN 400 MG: 400 CAPSULE ORAL at 08:50

## 2022-01-01 RX ADMIN — DOXYCYCLINE HYCLATE 100 MG: 100 TABLET, COATED ORAL at 09:51

## 2022-01-01 RX ADMIN — AMPICILLIN SODIUM AND SULBACTAM SODIUM 3000 MG: 2; 1 INJECTION, POWDER, FOR SOLUTION INTRAMUSCULAR; INTRAVENOUS at 17:49

## 2022-01-01 RX ADMIN — METOPROLOL TARTRATE 12.5 MG: 25 TABLET, FILM COATED ORAL at 08:54

## 2022-01-01 RX ADMIN — INSULIN GLARGINE 20 UNITS: 100 INJECTION, SOLUTION SUBCUTANEOUS at 20:42

## 2022-01-01 RX ADMIN — Medication 125 MCG/HR: at 08:43

## 2022-01-01 RX ADMIN — OXYCODONE HYDROCHLORIDE AND ACETAMINOPHEN 1 TABLET: 5; 325 TABLET ORAL at 00:07

## 2022-01-01 RX ADMIN — INSULIN LISPRO 4 UNITS: 100 INJECTION, SOLUTION INTRAVENOUS; SUBCUTANEOUS at 06:04

## 2022-01-01 RX ADMIN — OXYCODONE 10 MG: 5 TABLET ORAL at 13:50

## 2022-01-01 RX ADMIN — INSULIN LISPRO 9 UNITS: 100 INJECTION, SOLUTION INTRAVENOUS; SUBCUTANEOUS at 17:36

## 2022-01-01 RX ADMIN — Medication: at 08:09

## 2022-01-01 RX ADMIN — SODIUM CHLORIDE, PRESERVATIVE FREE 10 ML: 5 INJECTION INTRAVENOUS at 20:01

## 2022-01-01 RX ADMIN — METOCLOPRAMIDE 10 MG: 10 TABLET ORAL at 11:53

## 2022-01-01 RX ADMIN — METOCLOPRAMIDE 10 MG: 10 TABLET ORAL at 06:00

## 2022-01-01 RX ADMIN — METOPROLOL TARTRATE 25 MG: 25 TABLET, FILM COATED ORAL at 08:43

## 2022-01-01 RX ADMIN — INSULIN LISPRO 3 UNITS: 100 INJECTION, SOLUTION INTRAVENOUS; SUBCUTANEOUS at 11:51

## 2022-01-01 RX ADMIN — Medication 125 MCG/HR: at 18:13

## 2022-01-01 RX ADMIN — OXYCODONE HYDROCHLORIDE AND ACETAMINOPHEN 2 TABLET: 5; 325 TABLET ORAL at 08:07

## 2022-01-01 RX ADMIN — TIZANIDINE 8 MG: 4 TABLET ORAL at 20:24

## 2022-01-01 RX ADMIN — EPOETIN ALFA-EPBX 5000 UNITS: 10000 INJECTION, SOLUTION INTRAVENOUS; SUBCUTANEOUS at 07:56

## 2022-01-01 RX ADMIN — AMPICILLIN SODIUM AND SULBACTAM SODIUM 3000 MG: 2; 1 INJECTION, POWDER, FOR SOLUTION INTRAMUSCULAR; INTRAVENOUS at 05:14

## 2022-01-01 RX ADMIN — Medication: at 10:46

## 2022-01-01 RX ADMIN — PROPOFOL INJECTABLE EMULSION 20 MCG/KG/MIN: 10 INJECTION, EMULSION INTRAVENOUS at 00:05

## 2022-01-01 RX ADMIN — OXYCODONE HYDROCHLORIDE AND ACETAMINOPHEN 1 TABLET: 5; 325 TABLET ORAL at 12:14

## 2022-01-01 RX ADMIN — Medication 400 MG: at 21:29

## 2022-01-01 RX ADMIN — SUGAMMADEX 200 MG: 100 INJECTION, SOLUTION INTRAVENOUS at 15:02

## 2022-01-01 RX ADMIN — HYDROMORPHONE HYDROCHLORIDE 2 MG: 2 TABLET ORAL at 14:29

## 2022-01-01 RX ADMIN — SUCRALFATE 1 G: 1 TABLET ORAL at 20:43

## 2022-01-01 RX ADMIN — NOREPINEPHRINE BITARTRATE 6 MCG/MIN: 1 INJECTION, SOLUTION, CONCENTRATE INTRAVENOUS at 16:40

## 2022-01-01 RX ADMIN — HYDROMORPHONE HYDROCHLORIDE 2 MG: 2 TABLET ORAL at 08:59

## 2022-01-01 RX ADMIN — OXYCODONE 5 MG: 5 TABLET ORAL at 16:06

## 2022-01-01 RX ADMIN — ACETAMINOPHEN 650 MG: 325 TABLET ORAL at 11:04

## 2022-01-01 RX ADMIN — INSULIN LISPRO 6 UNITS: 100 INJECTION, SOLUTION INTRAVENOUS; SUBCUTANEOUS at 07:37

## 2022-01-01 RX ADMIN — METOCLOPRAMIDE 10 MG: 10 TABLET ORAL at 16:59

## 2022-01-01 RX ADMIN — GABAPENTIN 400 MG: 400 CAPSULE ORAL at 20:05

## 2022-01-01 RX ADMIN — PANTOPRAZOLE SODIUM 40 MG: 40 TABLET, DELAYED RELEASE ORAL at 08:51

## 2022-01-01 RX ADMIN — MIDAZOLAM HYDROCHLORIDE 2 MG: 2 INJECTION, SOLUTION INTRAMUSCULAR; INTRAVENOUS at 12:43

## 2022-01-01 RX ADMIN — DILTIAZEM HYDROCHLORIDE 30 MG: 60 TABLET, FILM COATED ORAL at 10:56

## 2022-01-01 RX ADMIN — MUPIROCIN: 20 OINTMENT TOPICAL at 08:34

## 2022-01-01 RX ADMIN — NAFCILLIN SODIUM 2000 MG: 2 INJECTION, POWDER, LYOPHILIZED, FOR SOLUTION INTRAMUSCULAR; INTRAVENOUS at 16:22

## 2022-01-01 RX ADMIN — SODIUM CHLORIDE, PRESERVATIVE FREE 10 ML: 5 INJECTION INTRAVENOUS at 20:46

## 2022-01-01 RX ADMIN — SODIUM CHLORIDE, PRESERVATIVE FREE 10 ML: 5 INJECTION INTRAVENOUS at 08:04

## 2022-01-01 RX ADMIN — ONDANSETRON HYDROCHLORIDE 4 MG: 2 INJECTION, SOLUTION INTRAMUSCULAR; INTRAVENOUS at 16:30

## 2022-01-01 RX ADMIN — METOCLOPRAMIDE 10 MG: 10 TABLET ORAL at 20:20

## 2022-01-01 RX ADMIN — FAMOTIDINE 20 MG: 10 INJECTION, SOLUTION INTRAVENOUS at 08:15

## 2022-01-01 RX ADMIN — Medication 1.8 MCG/KG/HR: at 09:31

## 2022-01-01 RX ADMIN — FLUTICASONE PROPIONATE 2 SPRAY: 50 SPRAY, METERED NASAL at 21:11

## 2022-01-01 RX ADMIN — GABAPENTIN 400 MG: 400 CAPSULE ORAL at 08:03

## 2022-01-01 RX ADMIN — HEPARIN SODIUM 5000 UNITS: 5000 INJECTION INTRAVENOUS; SUBCUTANEOUS at 12:49

## 2022-01-01 RX ADMIN — METOPROLOL TARTRATE 25 MG: 25 TABLET, FILM COATED ORAL at 20:12

## 2022-01-01 RX ADMIN — PHENYLEPHRINE HYDROCHLORIDE 100 MCG: 10 INJECTION INTRAVENOUS at 12:37

## 2022-01-01 RX ADMIN — Medication 1 TABLET: at 11:38

## 2022-01-01 RX ADMIN — SENNOSIDES 17.2 MG: 8.6 TABLET, COATED ORAL at 20:49

## 2022-01-01 RX ADMIN — INSULIN GLARGINE 25 UNITS: 100 INJECTION, SOLUTION SUBCUTANEOUS at 20:15

## 2022-01-01 RX ADMIN — DOXYCYCLINE HYCLATE 100 MG: 100 TABLET, COATED ORAL at 19:57

## 2022-01-01 RX ADMIN — FUROSEMIDE 40 MG: 40 TABLET ORAL at 16:19

## 2022-01-01 RX ADMIN — CARVEDILOL 12.5 MG: 6.25 TABLET, FILM COATED ORAL at 13:15

## 2022-01-01 RX ADMIN — METOCLOPRAMIDE 10 MG: 10 TABLET ORAL at 17:06

## 2022-01-01 RX ADMIN — INSULIN LISPRO 18 UNITS: 100 INJECTION, SOLUTION INTRAVENOUS; SUBCUTANEOUS at 11:56

## 2022-01-01 RX ADMIN — HYDROMORPHONE HYDROCHLORIDE 2 MG: 2 TABLET ORAL at 21:30

## 2022-01-01 RX ADMIN — ALBUMIN (HUMAN) 25 G: 0.25 INJECTION, SOLUTION INTRAVENOUS at 14:30

## 2022-01-01 RX ADMIN — HYDROMORPHONE HYDROCHLORIDE 1 MG: 1 INJECTION, SOLUTION INTRAMUSCULAR; INTRAVENOUS; SUBCUTANEOUS at 15:39

## 2022-01-01 RX ADMIN — FUROSEMIDE 40 MG: 40 TABLET ORAL at 06:14

## 2022-01-01 RX ADMIN — INSULIN LISPRO 3 UNITS: 100 INJECTION, SOLUTION INTRAVENOUS; SUBCUTANEOUS at 17:06

## 2022-01-01 RX ADMIN — OXYCODONE 10 MG: 5 TABLET ORAL at 01:49

## 2022-01-01 RX ADMIN — METOCLOPRAMIDE 10 MG: 10 TABLET ORAL at 15:50

## 2022-01-01 RX ADMIN — Medication 1.6 MCG/KG/HR: at 11:40

## 2022-01-01 RX ADMIN — INSULIN LISPRO 4 UNITS: 100 INJECTION, SOLUTION INTRAVENOUS; SUBCUTANEOUS at 07:37

## 2022-01-01 RX ADMIN — INSULIN GLARGINE 20 UNITS: 100 INJECTION, SOLUTION SUBCUTANEOUS at 09:05

## 2022-01-01 RX ADMIN — SODIUM CHLORIDE: 9 INJECTION, SOLUTION INTRAVENOUS at 08:08

## 2022-01-01 RX ADMIN — HYDROMORPHONE HYDROCHLORIDE 2 MG: 2 TABLET ORAL at 07:52

## 2022-01-01 RX ADMIN — LISINOPRIL 5 MG: 5 TABLET ORAL at 09:12

## 2022-01-01 RX ADMIN — INSULIN LISPRO 4 UNITS: 100 INJECTION, SOLUTION INTRAVENOUS; SUBCUTANEOUS at 16:42

## 2022-01-01 RX ADMIN — AMPICILLIN SODIUM AND SULBACTAM SODIUM 3000 MG: 2; 1 INJECTION, POWDER, FOR SOLUTION INTRAMUSCULAR; INTRAVENOUS at 04:57

## 2022-01-01 RX ADMIN — MIDAZOLAM HYDROCHLORIDE 2 MG: 2 INJECTION, SOLUTION INTRAMUSCULAR; INTRAVENOUS at 18:08

## 2022-01-01 RX ADMIN — FLUTICASONE PROPIONATE 2 SPRAY: 50 SPRAY, METERED NASAL at 22:15

## 2022-01-01 RX ADMIN — INSULIN GLARGINE 20 UNITS: 100 INJECTION, SOLUTION SUBCUTANEOUS at 20:05

## 2022-01-01 RX ADMIN — DOXYCYCLINE HYCLATE 100 MG: 100 TABLET, COATED ORAL at 08:45

## 2022-01-01 RX ADMIN — Medication: at 08:39

## 2022-01-01 RX ADMIN — MIDAZOLAM HYDROCHLORIDE 2 MG: 2 INJECTION, SOLUTION INTRAMUSCULAR; INTRAVENOUS at 07:35

## 2022-01-01 RX ADMIN — SODIUM CHLORIDE: 9 INJECTION, SOLUTION INTRAVENOUS at 23:38

## 2022-01-01 RX ADMIN — OXYCODONE HYDROCHLORIDE AND ACETAMINOPHEN 2 TABLET: 5; 325 TABLET ORAL at 04:16

## 2022-01-01 RX ADMIN — METOPROLOL TARTRATE 25 MG: 25 TABLET, FILM COATED ORAL at 10:00

## 2022-01-01 RX ADMIN — Medication 400 MG: at 21:12

## 2022-01-01 RX ADMIN — QUETIAPINE FUMARATE 100 MG: 100 TABLET ORAL at 21:21

## 2022-01-01 RX ADMIN — FLUCONAZOLE 100 MG: 100 TABLET ORAL at 09:31

## 2022-01-01 RX ADMIN — FENTANYL CITRATE 50 MCG: 50 INJECTION, SOLUTION INTRAMUSCULAR; INTRAVENOUS at 15:35

## 2022-01-01 RX ADMIN — Medication: at 07:53

## 2022-01-01 RX ADMIN — HEPARIN SODIUM 2600 UNITS: 1000 INJECTION INTRAVENOUS; SUBCUTANEOUS at 11:15

## 2022-01-01 RX ADMIN — NAFCILLIN SODIUM 2000 MG: 2 INJECTION, POWDER, LYOPHILIZED, FOR SOLUTION INTRAMUSCULAR; INTRAVENOUS at 23:54

## 2022-01-01 RX ADMIN — INSULIN LISPRO 3 UNITS: 100 INJECTION, SOLUTION INTRAVENOUS; SUBCUTANEOUS at 20:33

## 2022-01-01 RX ADMIN — DOXYCYCLINE HYCLATE 100 MG: 100 TABLET, COATED ORAL at 20:20

## 2022-01-01 RX ADMIN — PANTOPRAZOLE SODIUM 40 MG: 40 INJECTION, POWDER, FOR SOLUTION INTRAVENOUS at 09:10

## 2022-01-01 RX ADMIN — Medication 800 MG: at 08:49

## 2022-01-01 RX ADMIN — SUCRALFATE 1 G: 1 TABLET ORAL at 17:12

## 2022-01-01 RX ADMIN — Medication 4 MG/HR: at 09:59

## 2022-01-01 RX ADMIN — SODIUM CHLORIDE, PRESERVATIVE FREE 10 ML: 5 INJECTION INTRAVENOUS at 20:47

## 2022-01-01 RX ADMIN — INSULIN LISPRO 2 UNITS: 100 INJECTION, SOLUTION INTRAVENOUS; SUBCUTANEOUS at 19:48

## 2022-01-01 RX ADMIN — PANTOPRAZOLE SODIUM 40 MG: 40 INJECTION, POWDER, FOR SOLUTION INTRAVENOUS at 09:04

## 2022-01-01 RX ADMIN — HEPARIN SODIUM 5000 UNITS: 5000 INJECTION INTRAVENOUS; SUBCUTANEOUS at 20:18

## 2022-01-01 RX ADMIN — METOPROLOL SUCCINATE 12.5 MG: 25 TABLET, EXTENDED RELEASE ORAL at 12:16

## 2022-01-01 RX ADMIN — INSULIN LISPRO 5 UNITS: 100 INJECTION, SOLUTION INTRAVENOUS; SUBCUTANEOUS at 20:52

## 2022-01-01 RX ADMIN — METOCLOPRAMIDE 10 MG: 10 TABLET ORAL at 20:12

## 2022-01-01 RX ADMIN — SODIUM CHLORIDE, PRESERVATIVE FREE 10 ML: 5 INJECTION INTRAVENOUS at 11:29

## 2022-01-01 RX ADMIN — SODIUM CHLORIDE, POTASSIUM CHLORIDE, SODIUM LACTATE AND CALCIUM CHLORIDE: 600; 310; 30; 20 INJECTION, SOLUTION INTRAVENOUS at 11:27

## 2022-01-01 RX ADMIN — INSULIN LISPRO 4 UNITS: 100 INJECTION, SOLUTION INTRAVENOUS; SUBCUTANEOUS at 17:54

## 2022-01-01 RX ADMIN — METOPROLOL SUCCINATE 50 MG: 50 TABLET, EXTENDED RELEASE ORAL at 09:38

## 2022-01-01 RX ADMIN — INSULIN LISPRO 6 UNITS: 100 INJECTION, SOLUTION INTRAVENOUS; SUBCUTANEOUS at 20:08

## 2022-01-01 RX ADMIN — ROCURONIUM BROMIDE 10 MG: 10 INJECTION, SOLUTION INTRAVENOUS at 13:02

## 2022-01-01 RX ADMIN — GABAPENTIN 400 MG: 400 CAPSULE ORAL at 21:02

## 2022-01-01 RX ADMIN — QUETIAPINE FUMARATE 25 MG: 25 TABLET ORAL at 07:55

## 2022-01-01 RX ADMIN — HEPARIN SODIUM 2600 UNITS: 1000 INJECTION INTRAVENOUS; SUBCUTANEOUS at 16:45

## 2022-01-01 RX ADMIN — PROPOFOL INJECTABLE EMULSION 20 MCG/KG/MIN: 10 INJECTION, EMULSION INTRAVENOUS at 19:21

## 2022-01-01 RX ADMIN — HYDROMORPHONE HYDROCHLORIDE 1 MG: 1 INJECTION, SOLUTION INTRAMUSCULAR; INTRAVENOUS; SUBCUTANEOUS at 23:53

## 2022-01-01 RX ADMIN — Medication 1.6 MCG/KG/HR: at 08:45

## 2022-01-01 RX ADMIN — Medication 0.8 MCG/KG/HR: at 13:38

## 2022-01-01 RX ADMIN — ONDANSETRON 4 MG: 2 INJECTION INTRAMUSCULAR; INTRAVENOUS at 10:41

## 2022-01-01 RX ADMIN — TAMSULOSIN HYDROCHLORIDE 0.4 MG: 0.4 CAPSULE ORAL at 21:35

## 2022-01-01 RX ADMIN — INSULIN GLARGINE 25 UNITS: 100 INJECTION, SOLUTION SUBCUTANEOUS at 21:17

## 2022-01-01 RX ADMIN — CHLORHEXIDINE GLUCONATE 0.12% ORAL RINSE 15 ML: 1.2 LIQUID ORAL at 20:05

## 2022-01-01 RX ADMIN — CHLORHEXIDINE GLUCONATE 0.12% ORAL RINSE 15 ML: 1.2 LIQUID ORAL at 20:29

## 2022-01-01 RX ADMIN — HEPARIN SODIUM 5000 UNITS: 5000 INJECTION INTRAVENOUS; SUBCUTANEOUS at 13:21

## 2022-01-01 RX ADMIN — PANTOPRAZOLE SODIUM 40 MG: 40 TABLET, DELAYED RELEASE ORAL at 07:49

## 2022-01-01 RX ADMIN — SUCRALFATE 1 G: 1 TABLET ORAL at 17:47

## 2022-01-01 RX ADMIN — Medication 1.5 MCG/KG/HR: at 16:47

## 2022-01-01 RX ADMIN — FAMOTIDINE 20 MG: 10 INJECTION, SOLUTION INTRAVENOUS at 11:32

## 2022-01-01 RX ADMIN — Medication: at 20:44

## 2022-01-01 RX ADMIN — INSULIN LISPRO 3 UNITS: 100 INJECTION, SOLUTION INTRAVENOUS; SUBCUTANEOUS at 12:14

## 2022-01-01 RX ADMIN — INSULIN GLARGINE 15 UNITS: 100 INJECTION, SOLUTION SUBCUTANEOUS at 09:35

## 2022-01-01 RX ADMIN — METOCLOPRAMIDE 10 MG: 10 TABLET ORAL at 06:41

## 2022-01-01 RX ADMIN — AMPICILLIN SODIUM AND SULBACTAM SODIUM 3000 MG: 2; 1 INJECTION, POWDER, FOR SOLUTION INTRAMUSCULAR; INTRAVENOUS at 20:40

## 2022-01-01 RX ADMIN — PANTOPRAZOLE SODIUM 40 MG: 40 TABLET, DELAYED RELEASE ORAL at 08:05

## 2022-01-01 RX ADMIN — TIZANIDINE 8 MG: 4 TABLET ORAL at 19:57

## 2022-01-01 RX ADMIN — OXYCODONE HYDROCHLORIDE AND ACETAMINOPHEN 1 TABLET: 5; 325 TABLET ORAL at 23:25

## 2022-01-01 RX ADMIN — LINEZOLID 600 MG: 600 INJECTION, SOLUTION INTRAVENOUS at 01:06

## 2022-01-01 RX ADMIN — Medication 2 MCG/KG/HR: at 14:24

## 2022-01-01 RX ADMIN — Medication 125 MCG/HR: at 02:16

## 2022-01-01 RX ADMIN — MIDAZOLAM HYDROCHLORIDE 2 MG: 2 INJECTION, SOLUTION INTRAMUSCULAR; INTRAVENOUS at 23:38

## 2022-01-01 RX ADMIN — METOPROLOL TARTRATE 5 MG: 5 INJECTION INTRAVENOUS at 05:36

## 2022-01-01 RX ADMIN — Medication 800 MG: at 15:18

## 2022-01-01 RX ADMIN — OXYCODONE 10 MG: 5 TABLET ORAL at 05:26

## 2022-01-01 RX ADMIN — TIZANIDINE 4 MG: 4 TABLET ORAL at 20:33

## 2022-01-01 RX ADMIN — Medication: at 20:51

## 2022-01-01 RX ADMIN — PAROXETINE HYDROCHLORIDE 10 MG: 20 TABLET, FILM COATED ORAL at 09:05

## 2022-01-01 RX ADMIN — HYDROMORPHONE HYDROCHLORIDE 2 MG: 2 TABLET ORAL at 09:39

## 2022-01-01 RX ADMIN — CARVEDILOL 12.5 MG: 6.25 TABLET, FILM COATED ORAL at 07:47

## 2022-01-01 RX ADMIN — PANTOPRAZOLE SODIUM 40 MG: 40 TABLET, DELAYED RELEASE ORAL at 09:10

## 2022-01-01 RX ADMIN — DILTIAZEM HYDROCHLORIDE 30 MG: 60 TABLET, FILM COATED ORAL at 16:42

## 2022-01-01 RX ADMIN — ACETAMINOPHEN 650 MG: 325 TABLET ORAL at 22:39

## 2022-01-01 RX ADMIN — INSULIN LISPRO 6 UNITS: 100 INJECTION, SOLUTION INTRAVENOUS; SUBCUTANEOUS at 11:34

## 2022-01-01 RX ADMIN — HEPARIN SODIUM 5000 UNITS: 5000 INJECTION INTRAVENOUS; SUBCUTANEOUS at 06:12

## 2022-01-01 RX ADMIN — QUETIAPINE FUMARATE 100 MG: 100 TABLET ORAL at 21:49

## 2022-01-01 RX ADMIN — LISINOPRIL 5 MG: 5 TABLET ORAL at 09:01

## 2022-01-01 RX ADMIN — PAROXETINE HYDROCHLORIDE 20 MG: 20 TABLET, FILM COATED ORAL at 10:00

## 2022-01-01 RX ADMIN — OXYCODONE 10 MG: 5 TABLET ORAL at 05:55

## 2022-01-01 RX ADMIN — HEPARIN SODIUM 5000 UNITS: 5000 INJECTION INTRAVENOUS; SUBCUTANEOUS at 05:57

## 2022-01-01 RX ADMIN — BISACODYL 10 MG: 10 SUPPOSITORY RECTAL at 15:15

## 2022-01-01 RX ADMIN — PROPOFOL INJECTABLE EMULSION 50 MCG/KG/MIN: 10 INJECTION, EMULSION INTRAVENOUS at 22:23

## 2022-01-01 RX ADMIN — Medication 1.4 MCG/KG/HR: at 23:30

## 2022-01-01 RX ADMIN — HEPARIN SODIUM 5000 UNITS: 5000 INJECTION INTRAVENOUS; SUBCUTANEOUS at 13:22

## 2022-01-01 RX ADMIN — OXYCODONE 7.5 MG: 15 TABLET ORAL at 10:50

## 2022-01-01 RX ADMIN — SUCRALFATE 1 G: 1 TABLET ORAL at 09:50

## 2022-01-01 RX ADMIN — OXYCODONE HYDROCHLORIDE AND ACETAMINOPHEN 2 TABLET: 5; 325 TABLET ORAL at 16:10

## 2022-01-01 RX ADMIN — HEPARIN SODIUM 5000 UNITS: 5000 INJECTION INTRAVENOUS; SUBCUTANEOUS at 20:51

## 2022-01-01 RX ADMIN — DILTIAZEM HYDROCHLORIDE 30 MG: 60 TABLET, FILM COATED ORAL at 18:12

## 2022-01-01 RX ADMIN — Medication 2 MCG/KG/HR: at 06:04

## 2022-01-01 RX ADMIN — MIDAZOLAM HYDROCHLORIDE 2 MG: 2 INJECTION, SOLUTION INTRAMUSCULAR; INTRAVENOUS at 08:43

## 2022-01-01 RX ADMIN — INSULIN LISPRO 3 UNITS: 100 INJECTION, SOLUTION INTRAVENOUS; SUBCUTANEOUS at 11:45

## 2022-01-01 RX ADMIN — MIDAZOLAM HYDROCHLORIDE 2 MG: 2 INJECTION, SOLUTION INTRAMUSCULAR; INTRAVENOUS at 03:09

## 2022-01-01 RX ADMIN — NOREPINEPHRINE BITARTRATE 9 MCG/MIN: 1 INJECTION, SOLUTION, CONCENTRATE INTRAVENOUS at 13:35

## 2022-01-01 RX ADMIN — CARVEDILOL 12.5 MG: 6.25 TABLET, FILM COATED ORAL at 08:13

## 2022-01-01 RX ADMIN — PROCHLORPERAZINE MALEATE 5 MG: 5 TABLET ORAL at 05:26

## 2022-01-01 RX ADMIN — LABETALOL HYDROCHLORIDE 20 MG: 5 INJECTION, SOLUTION INTRAVENOUS at 12:40

## 2022-01-01 RX ADMIN — Medication 100 MCG/HR: at 17:40

## 2022-01-01 RX ADMIN — TAMSULOSIN HYDROCHLORIDE 0.4 MG: 0.4 CAPSULE ORAL at 20:08

## 2022-01-01 RX ADMIN — QUETIAPINE FUMARATE 25 MG: 25 TABLET ORAL at 09:58

## 2022-01-01 RX ADMIN — Medication 1 TABLET: at 10:43

## 2022-01-01 RX ADMIN — CARVEDILOL 12.5 MG: 6.25 TABLET, FILM COATED ORAL at 09:11

## 2022-01-01 RX ADMIN — SUCRALFATE 1 G: 1 TABLET ORAL at 21:09

## 2022-01-01 RX ADMIN — METOCLOPRAMIDE 10 MG: 10 TABLET ORAL at 17:25

## 2022-01-01 RX ADMIN — CHLORHEXIDINE GLUCONATE 0.12% ORAL RINSE 15 ML: 1.2 LIQUID ORAL at 19:48

## 2022-01-01 RX ADMIN — INSULIN LISPRO 3 UNITS: 100 INJECTION, SOLUTION INTRAVENOUS; SUBCUTANEOUS at 22:16

## 2022-01-01 RX ADMIN — HEPARIN SODIUM 5000 UNITS: 5000 INJECTION INTRAVENOUS; SUBCUTANEOUS at 06:30

## 2022-01-01 RX ADMIN — OXYCODONE HYDROCHLORIDE AND ACETAMINOPHEN 1 TABLET: 5; 325 TABLET ORAL at 15:05

## 2022-01-01 RX ADMIN — INSULIN LISPRO 6 UNITS: 100 INJECTION, SOLUTION INTRAVENOUS; SUBCUTANEOUS at 11:47

## 2022-01-01 RX ADMIN — NAFCILLIN SODIUM 2000 MG: 2 INJECTION, POWDER, LYOPHILIZED, FOR SOLUTION INTRAMUSCULAR; INTRAVENOUS at 12:04

## 2022-01-01 RX ADMIN — QUETIAPINE FUMARATE 25 MG: 25 TABLET ORAL at 16:35

## 2022-01-01 RX ADMIN — Medication 100 MCG/HR: at 22:47

## 2022-01-01 RX ADMIN — OXYCODONE HYDROCHLORIDE AND ACETAMINOPHEN 1 TABLET: 5; 325 TABLET ORAL at 23:27

## 2022-01-01 RX ADMIN — PROPOFOL INJECTABLE EMULSION 20 MCG/KG/MIN: 10 INJECTION, EMULSION INTRAVENOUS at 06:42

## 2022-01-01 RX ADMIN — Medication 800 MG: at 21:08

## 2022-01-01 RX ADMIN — EPOETIN ALFA-EPBX 10000 UNITS: 10000 INJECTION, SOLUTION INTRAVENOUS; SUBCUTANEOUS at 17:52

## 2022-01-01 RX ADMIN — PROCHLORPERAZINE MALEATE 5 MG: 5 TABLET ORAL at 12:16

## 2022-01-01 RX ADMIN — INSULIN GLARGINE 20 UNITS: 100 INJECTION, SOLUTION SUBCUTANEOUS at 20:58

## 2022-01-01 RX ADMIN — FENTANYL CITRATE 50 MCG: 50 INJECTION INTRAMUSCULAR; INTRAVENOUS at 11:37

## 2022-01-01 RX ADMIN — SUCRALFATE 1 G: 1 TABLET ORAL at 11:04

## 2022-01-01 RX ADMIN — SODIUM CHLORIDE: 9 INJECTION, SOLUTION INTRAVENOUS at 06:03

## 2022-01-01 RX ADMIN — SODIUM CHLORIDE, PRESERVATIVE FREE 10 ML: 5 INJECTION INTRAVENOUS at 20:14

## 2022-01-01 RX ADMIN — PROPOFOL INJECTABLE EMULSION 20 MCG/KG/MIN: 10 INJECTION, EMULSION INTRAVENOUS at 16:00

## 2022-01-01 RX ADMIN — NIFEDIPINE 30 MG: 30 TABLET, FILM COATED, EXTENDED RELEASE ORAL at 09:26

## 2022-01-01 RX ADMIN — PAROXETINE HYDROCHLORIDE 20 MG: 20 TABLET, FILM COATED ORAL at 08:51

## 2022-01-01 RX ADMIN — QUETIAPINE FUMARATE 100 MG: 100 TABLET ORAL at 20:06

## 2022-01-01 RX ADMIN — EPINEPHRINE 1 MG: 0.1 INJECTION, SOLUTION ENDOTRACHEAL; INTRACARDIAC; INTRAVENOUS at 03:03

## 2022-01-01 RX ADMIN — OXYCODONE 10 MG: 5 TABLET ORAL at 02:24

## 2022-01-01 RX ADMIN — METRONIDAZOLE 500 MG: 500 INJECTION, SOLUTION INTRAVENOUS at 21:16

## 2022-01-01 RX ADMIN — Medication 100 MCG/HR: at 00:17

## 2022-01-01 RX ADMIN — HYDROMORPHONE HYDROCHLORIDE 2 MG: 2 TABLET ORAL at 20:20

## 2022-01-01 RX ADMIN — NAFCILLIN SODIUM 2000 MG: 2 INJECTION, POWDER, LYOPHILIZED, FOR SOLUTION INTRAMUSCULAR; INTRAVENOUS at 07:04

## 2022-01-01 RX ADMIN — INSULIN LISPRO 3 UNITS: 100 INJECTION, SOLUTION INTRAVENOUS; SUBCUTANEOUS at 11:54

## 2022-01-01 RX ADMIN — Medication 400 MG: at 08:50

## 2022-01-01 RX ADMIN — Medication: at 08:05

## 2022-01-01 RX ADMIN — ACETAMINOPHEN 650 MG: 325 TABLET ORAL at 07:51

## 2022-01-01 RX ADMIN — PANTOPRAZOLE SODIUM 40 MG: 40 TABLET, DELAYED RELEASE ORAL at 09:38

## 2022-01-01 RX ADMIN — TAMSULOSIN HYDROCHLORIDE 0.4 MG: 0.4 CAPSULE ORAL at 21:19

## 2022-01-01 RX ADMIN — PROPOFOL 25 MCG/KG/MIN: 10 INJECTION, EMULSION INTRAVENOUS at 17:37

## 2022-01-01 RX ADMIN — OXYCODONE 10 MG: 5 TABLET ORAL at 10:12

## 2022-01-01 RX ADMIN — INSULIN LISPRO 3 UNITS: 100 INJECTION, SOLUTION INTRAVENOUS; SUBCUTANEOUS at 04:36

## 2022-01-01 RX ADMIN — NAFCILLIN SODIUM 2000 MG: 2 INJECTION, POWDER, LYOPHILIZED, FOR SOLUTION INTRAMUSCULAR; INTRAVENOUS at 05:53

## 2022-01-01 RX ADMIN — FLUTICASONE PROPIONATE 2 SPRAY: 50 SPRAY, METERED NASAL at 21:19

## 2022-01-01 RX ADMIN — HYDROMORPHONE HYDROCHLORIDE 1 MG: 1 INJECTION, SOLUTION INTRAMUSCULAR; INTRAVENOUS; SUBCUTANEOUS at 23:10

## 2022-01-01 RX ADMIN — FUROSEMIDE 40 MG: 40 TABLET ORAL at 06:24

## 2022-01-01 RX ADMIN — INSULIN GLARGINE 25 UNITS: 100 INJECTION, SOLUTION SUBCUTANEOUS at 21:54

## 2022-01-01 RX ADMIN — INSULIN LISPRO 3 UNITS: 100 INJECTION, SOLUTION INTRAVENOUS; SUBCUTANEOUS at 08:29

## 2022-01-01 RX ADMIN — METOCLOPRAMIDE 10 MG: 10 TABLET ORAL at 09:52

## 2022-01-01 RX ADMIN — GABAPENTIN 100 MG: 100 CAPSULE ORAL at 09:06

## 2022-01-01 RX ADMIN — SODIUM CHLORIDE 7.04 UNITS/HR: 9 INJECTION, SOLUTION INTRAVENOUS at 22:01

## 2022-01-01 RX ADMIN — METOPROLOL TARTRATE 5 MG: 5 INJECTION INTRAVENOUS at 11:04

## 2022-01-01 RX ADMIN — MIDAZOLAM HYDROCHLORIDE 2 MG: 2 INJECTION, SOLUTION INTRAMUSCULAR; INTRAVENOUS at 17:05

## 2022-01-01 RX ADMIN — Medication 1.8 MCG/KG/HR: at 08:18

## 2022-01-01 RX ADMIN — METOCLOPRAMIDE 10 MG: 10 TABLET ORAL at 09:25

## 2022-01-01 RX ADMIN — HEPARIN SODIUM 2000 UNITS: 1000 INJECTION INTRAVENOUS; SUBCUTANEOUS at 04:24

## 2022-01-01 RX ADMIN — HYDROMORPHONE HYDROCHLORIDE 2 MG: 2 TABLET ORAL at 14:34

## 2022-01-01 RX ADMIN — SODIUM CHLORIDE, PRESERVATIVE FREE 10 ML: 5 INJECTION INTRAVENOUS at 07:56

## 2022-01-01 RX ADMIN — FENTANYL CITRATE 50 MCG: 50 INJECTION, SOLUTION INTRAMUSCULAR; INTRAVENOUS at 15:45

## 2022-01-01 RX ADMIN — HYDROMORPHONE HYDROCHLORIDE 1 MG: 1 INJECTION, SOLUTION INTRAMUSCULAR; INTRAVENOUS; SUBCUTANEOUS at 03:37

## 2022-01-01 RX ADMIN — Medication 800 MG: at 15:22

## 2022-01-01 RX ADMIN — METOCLOPRAMIDE 10 MG: 10 TABLET ORAL at 11:21

## 2022-01-01 RX ADMIN — MIDAZOLAM HYDROCHLORIDE 2 MG: 2 INJECTION, SOLUTION INTRAMUSCULAR; INTRAVENOUS at 01:58

## 2022-01-01 RX ADMIN — CALCIUM GLUCONATE 1000 MG: 98 INJECTION, SOLUTION INTRAVENOUS at 11:43

## 2022-01-01 RX ADMIN — Medication: at 07:56

## 2022-01-01 RX ADMIN — SODIUM CHLORIDE, PRESERVATIVE FREE 10 ML: 5 INJECTION INTRAVENOUS at 20:07

## 2022-01-01 RX ADMIN — PROPOFOL INJECTABLE EMULSION 50 MCG/KG/MIN: 10 INJECTION, EMULSION INTRAVENOUS at 21:52

## 2022-01-01 RX ADMIN — INSULIN GLARGINE 15 UNITS: 100 INJECTION, SOLUTION SUBCUTANEOUS at 09:16

## 2022-01-01 RX ADMIN — METOCLOPRAMIDE 10 MG: 10 TABLET ORAL at 16:39

## 2022-01-01 RX ADMIN — Medication 800 MG: at 19:39

## 2022-01-01 RX ADMIN — PROCHLORPERAZINE MALEATE 5 MG: 5 TABLET ORAL at 08:58

## 2022-01-01 RX ADMIN — PANTOPRAZOLE SODIUM 40 MG: 40 INJECTION, POWDER, FOR SOLUTION INTRAVENOUS at 21:13

## 2022-01-01 RX ADMIN — TIZANIDINE 8 MG: 4 TABLET ORAL at 08:36

## 2022-01-01 RX ADMIN — HYDROMORPHONE HYDROCHLORIDE 1 MG: 1 INJECTION, SOLUTION INTRAMUSCULAR; INTRAVENOUS; SUBCUTANEOUS at 05:42

## 2022-01-01 RX ADMIN — OXYCODONE HYDROCHLORIDE AND ACETAMINOPHEN 1 TABLET: 5; 325 TABLET ORAL at 17:11

## 2022-01-01 RX ADMIN — HYDROMORPHONE HYDROCHLORIDE 1 MG: 1 INJECTION, SOLUTION INTRAMUSCULAR; INTRAVENOUS; SUBCUTANEOUS at 15:42

## 2022-01-01 RX ADMIN — SODIUM CHLORIDE, PRESERVATIVE FREE 10 ML: 5 INJECTION INTRAVENOUS at 20:04

## 2022-01-01 RX ADMIN — HEPARIN SODIUM 5000 UNITS: 5000 INJECTION INTRAVENOUS; SUBCUTANEOUS at 06:08

## 2022-01-01 RX ADMIN — PANTOPRAZOLE SODIUM 40 MG: 40 TABLET, DELAYED RELEASE ORAL at 06:46

## 2022-01-01 RX ADMIN — FLUCONAZOLE 200 MG: 100 TABLET ORAL at 09:43

## 2022-01-01 RX ADMIN — PANTOPRAZOLE SODIUM 40 MG: 40 INJECTION, POWDER, FOR SOLUTION INTRAVENOUS at 08:28

## 2022-01-01 RX ADMIN — PANTOPRAZOLE SODIUM 40 MG: 40 INJECTION, POWDER, FOR SOLUTION INTRAVENOUS at 09:03

## 2022-01-01 RX ADMIN — DILTIAZEM HYDROCHLORIDE 30 MG: 60 TABLET, FILM COATED ORAL at 05:55

## 2022-01-01 RX ADMIN — INSULIN LISPRO 18 UNITS: 100 INJECTION, SOLUTION INTRAVENOUS; SUBCUTANEOUS at 04:45

## 2022-01-01 RX ADMIN — NIFEDIPINE 30 MG: 30 TABLET, FILM COATED, EXTENDED RELEASE ORAL at 21:49

## 2022-01-01 RX ADMIN — OXYCODONE HYDROCHLORIDE AND ACETAMINOPHEN 1 TABLET: 5; 325 TABLET ORAL at 05:52

## 2022-01-01 RX ADMIN — METOCLOPRAMIDE 10 MG: 10 TABLET ORAL at 21:03

## 2022-01-01 RX ADMIN — METOCLOPRAMIDE 10 MG: 10 TABLET ORAL at 17:55

## 2022-01-01 RX ADMIN — TIZANIDINE 4 MG: 4 TABLET ORAL at 13:47

## 2022-01-01 RX ADMIN — SUCRALFATE 1 G: 1 TABLET ORAL at 06:58

## 2022-01-01 RX ADMIN — HYDROMORPHONE HYDROCHLORIDE 1 MG: 1 INJECTION, SOLUTION INTRAMUSCULAR; INTRAVENOUS; SUBCUTANEOUS at 04:22

## 2022-01-01 RX ADMIN — Medication 2000 MG: at 10:39

## 2022-01-01 RX ADMIN — INSULIN GLARGINE 25 UNITS: 100 INJECTION, SOLUTION SUBCUTANEOUS at 21:01

## 2022-01-01 RX ADMIN — METOCLOPRAMIDE 10 MG: 10 TABLET ORAL at 09:42

## 2022-01-01 RX ADMIN — FUROSEMIDE 40 MG: 40 TABLET ORAL at 15:18

## 2022-01-01 RX ADMIN — METOCLOPRAMIDE 10 MG: 10 TABLET ORAL at 16:52

## 2022-01-01 RX ADMIN — GABAPENTIN 400 MG: 400 CAPSULE ORAL at 13:55

## 2022-01-01 RX ADMIN — NIFEDIPINE 30 MG: 30 TABLET, FILM COATED, EXTENDED RELEASE ORAL at 09:32

## 2022-01-01 RX ADMIN — Medication 2000 MG: at 20:08

## 2022-01-01 RX ADMIN — MIDAZOLAM HYDROCHLORIDE 2 MG: 2 INJECTION, SOLUTION INTRAMUSCULAR; INTRAVENOUS at 10:43

## 2022-01-01 RX ADMIN — METOCLOPRAMIDE 10 MG: 10 TABLET ORAL at 16:19

## 2022-01-01 RX ADMIN — METOCLOPRAMIDE 10 MG: 10 TABLET ORAL at 11:33

## 2022-01-01 RX ADMIN — OXYCODONE 10 MG: 5 TABLET ORAL at 07:55

## 2022-01-01 RX ADMIN — INSULIN GLARGINE 25 UNITS: 100 INJECTION, SOLUTION SUBCUTANEOUS at 20:52

## 2022-01-01 RX ADMIN — ONDANSETRON HYDROCHLORIDE 4 MG: 2 INJECTION, SOLUTION INTRAMUSCULAR; INTRAVENOUS at 16:25

## 2022-01-01 RX ADMIN — Medication: at 08:38

## 2022-01-01 RX ADMIN — HYDROMORPHONE HYDROCHLORIDE 2 MG: 2 TABLET ORAL at 14:50

## 2022-01-01 RX ADMIN — TIZANIDINE 8 MG: 4 TABLET ORAL at 21:09

## 2022-01-01 RX ADMIN — OXYCODONE 10 MG: 5 TABLET ORAL at 21:49

## 2022-01-01 RX ADMIN — DILTIAZEM HYDROCHLORIDE 30 MG: 60 TABLET, FILM COATED ORAL at 23:27

## 2022-01-01 RX ADMIN — TIZANIDINE 8 MG: 4 TABLET ORAL at 20:01

## 2022-01-01 RX ADMIN — DOXYCYCLINE HYCLATE 100 MG: 100 TABLET, COATED ORAL at 19:39

## 2022-01-01 RX ADMIN — OLANZAPINE 5 MG: 5 TABLET, FILM COATED ORAL at 20:18

## 2022-01-01 RX ADMIN — HEPARIN SODIUM 5000 UNITS: 5000 INJECTION INTRAVENOUS; SUBCUTANEOUS at 16:39

## 2022-01-01 RX ADMIN — INSULIN GLARGINE 25 UNITS: 100 INJECTION, SOLUTION SUBCUTANEOUS at 21:28

## 2022-01-01 RX ADMIN — SUCRALFATE 1 G: 1 TABLET ORAL at 11:31

## 2022-01-01 RX ADMIN — METOPROLOL SUCCINATE 25 MG: 25 TABLET, EXTENDED RELEASE ORAL at 08:32

## 2022-01-01 RX ADMIN — PAROXETINE HYDROCHLORIDE 20 MG: 20 TABLET, FILM COATED ORAL at 08:45

## 2022-01-01 RX ADMIN — ONDANSETRON HYDROCHLORIDE 4 MG: 2 INJECTION, SOLUTION INTRAMUSCULAR; INTRAVENOUS at 00:51

## 2022-01-01 RX ADMIN — CARVEDILOL 12.5 MG: 6.25 TABLET, FILM COATED ORAL at 18:23

## 2022-01-01 RX ADMIN — METOCLOPRAMIDE 10 MG: 10 TABLET ORAL at 10:52

## 2022-01-01 RX ADMIN — HYDROMORPHONE HYDROCHLORIDE 1 MG: 1 INJECTION, SOLUTION INTRAMUSCULAR; INTRAVENOUS; SUBCUTANEOUS at 10:25

## 2022-01-01 RX ADMIN — TIZANIDINE 8 MG: 4 TABLET ORAL at 08:39

## 2022-01-01 RX ADMIN — ASCORBIC ACID, THIAMINE MONONITRATE,RIBOFLAVIN, NIACINAMIDE, PYRIDOXINE HYDROCHLORIDE, FOLIC ACID, CYANOCOBALAMIN, BIOTIN, CALCIUM PANTOTHENATE, 1 MG: 100; 1.5; 1.7; 20; 10; 1; 6000; 150000; 5 CAPSULE, LIQUID FILLED ORAL at 13:15

## 2022-01-01 RX ADMIN — CARVEDILOL 12.5 MG: 6.25 TABLET, FILM COATED ORAL at 17:53

## 2022-01-01 RX ADMIN — TRAZODONE HYDROCHLORIDE 50 MG: 50 TABLET ORAL at 19:39

## 2022-01-01 RX ADMIN — ACETAMINOPHEN 500 MG: 650 SOLUTION ORAL at 20:53

## 2022-01-01 RX ADMIN — Medication: at 01:00

## 2022-01-01 RX ADMIN — OXYCODONE 10 MG: 5 TABLET ORAL at 21:20

## 2022-01-01 RX ADMIN — QUETIAPINE FUMARATE 100 MG: 100 TABLET ORAL at 20:53

## 2022-01-01 RX ADMIN — Medication 2000 MG: at 00:00

## 2022-01-01 RX ADMIN — GABAPENTIN 100 MG: 100 CAPSULE ORAL at 20:52

## 2022-01-01 RX ADMIN — CHLORHEXIDINE GLUCONATE 0.12% ORAL RINSE 15 ML: 1.2 LIQUID ORAL at 11:41

## 2022-01-01 RX ADMIN — CARVEDILOL 12.5 MG: 6.25 TABLET, FILM COATED ORAL at 16:47

## 2022-01-01 RX ADMIN — OXYCODONE 10 MG: 5 TABLET ORAL at 03:56

## 2022-01-01 RX ADMIN — SODIUM CHLORIDE 4.75 UNITS/HR: 9 INJECTION, SOLUTION INTRAVENOUS at 03:42

## 2022-01-01 RX ADMIN — INSULIN LISPRO 3 UNITS: 100 INJECTION, SOLUTION INTRAVENOUS; SUBCUTANEOUS at 00:11

## 2022-01-01 RX ADMIN — HEPARIN SODIUM 5000 UNITS: 5000 INJECTION INTRAVENOUS; SUBCUTANEOUS at 05:26

## 2022-01-01 RX ADMIN — METOPROLOL SUCCINATE 50 MG: 50 TABLET, EXTENDED RELEASE ORAL at 21:29

## 2022-01-01 RX ADMIN — SODIUM CHLORIDE, PRESERVATIVE FREE 10 ML: 5 INJECTION INTRAVENOUS at 09:02

## 2022-01-01 RX ADMIN — FLUTICASONE PROPIONATE 2 SPRAY: 50 SPRAY, METERED NASAL at 20:17

## 2022-01-01 RX ADMIN — SODIUM CHLORIDE, PRESERVATIVE FREE 10 ML: 5 INJECTION INTRAVENOUS at 09:36

## 2022-01-01 RX ADMIN — LIDOCAINE HYDROCHLORIDE: 40 SOLUTION TOPICAL at 14:48

## 2022-01-01 RX ADMIN — Medication 800 MG: at 16:20

## 2022-01-01 RX ADMIN — PANTOPRAZOLE SODIUM 40 MG: 40 TABLET, DELAYED RELEASE ORAL at 19:39

## 2022-01-01 RX ADMIN — PANTOPRAZOLE SODIUM 40 MG: 40 TABLET, DELAYED RELEASE ORAL at 08:44

## 2022-01-01 RX ADMIN — DOXYCYCLINE HYCLATE 100 MG: 100 TABLET, COATED ORAL at 20:08

## 2022-01-01 RX ADMIN — MUPIROCIN: 20 OINTMENT TOPICAL at 08:36

## 2022-01-01 RX ADMIN — SUCRALFATE 1 G: 1 TABLET ORAL at 06:17

## 2022-01-01 RX ADMIN — SUCRALFATE 1 G: 1 TABLET ORAL at 16:30

## 2022-01-01 ASSESSMENT — PAIN SCALES - GENERAL
PAINLEVEL_OUTOF10: 7
PAINLEVEL_OUTOF10: 0
PAINLEVEL_OUTOF10: 7
PAINLEVEL_OUTOF10: 8
PAINLEVEL_OUTOF10: 0
PAINLEVEL_OUTOF10: 10
PAINLEVEL_OUTOF10: 0
PAINLEVEL_OUTOF10: 8
PAINLEVEL_OUTOF10: 9
PAINLEVEL_OUTOF10: 0
PAINLEVEL_OUTOF10: 7
PAINLEVEL_OUTOF10: 6
PAINLEVEL_OUTOF10: 7
PAINLEVEL_OUTOF10: 8
PAINLEVEL_OUTOF10: 7
PAINLEVEL_OUTOF10: 4
PAINLEVEL_OUTOF10: 0
PAINLEVEL_OUTOF10: 7
PAINLEVEL_OUTOF10: 0
PAINLEVEL_OUTOF10: 8
PAINLEVEL_OUTOF10: 8
PAINLEVEL_OUTOF10: 7
PAINLEVEL_OUTOF10: 0
PAINLEVEL_OUTOF10: 5
PAINLEVEL_OUTOF10: 9
PAINLEVEL_OUTOF10: 8
PAINLEVEL_OUTOF10: 0
PAINLEVEL_OUTOF10: 7
PAINLEVEL_OUTOF10: 7
PAINLEVEL_OUTOF10: 5
PAINLEVEL_OUTOF10: 7
PAINLEVEL_OUTOF10: 5
PAINLEVEL_OUTOF10: 0
PAINLEVEL_OUTOF10: 0
PAINLEVEL_OUTOF10: 5
PAINLEVEL_OUTOF10: 0
PAINLEVEL_OUTOF10: 4
PAINLEVEL_OUTOF10: 7
PAINLEVEL_OUTOF10: 7
PAINLEVEL_OUTOF10: 8
PAINLEVEL_OUTOF10: 10
PAINLEVEL_OUTOF10: 0
PAINLEVEL_OUTOF10: 0
PAINLEVEL_OUTOF10: 5
PAINLEVEL_OUTOF10: 0
PAINLEVEL_OUTOF10: 5
PAINLEVEL_OUTOF10: 7
PAINLEVEL_OUTOF10: 5
PAINLEVEL_OUTOF10: 2
PAINLEVEL_OUTOF10: 8
PAINLEVEL_OUTOF10: 0
PAINLEVEL_OUTOF10: 5
PAINLEVEL_OUTOF10: 0
PAINLEVEL_OUTOF10: 6
PAINLEVEL_OUTOF10: 9
PAINLEVEL_OUTOF10: 9
PAINLEVEL_OUTOF10: 8
PAINLEVEL_OUTOF10: 8
PAINLEVEL_OUTOF10: 0
PAINLEVEL_OUTOF10: 3
PAINLEVEL_OUTOF10: 9
PAINLEVEL_OUTOF10: 7
PAINLEVEL_OUTOF10: 4
PAINLEVEL_OUTOF10: 8
PAINLEVEL_OUTOF10: 9
PAINLEVEL_OUTOF10: 7
PAINLEVEL_OUTOF10: 6
PAINLEVEL_OUTOF10: 0
PAINLEVEL_OUTOF10: 5
PAINLEVEL_OUTOF10: 9
PAINLEVEL_OUTOF10: 3
PAINLEVEL_OUTOF10: 0
PAINLEVEL_OUTOF10: 5
PAINLEVEL_OUTOF10: 5
PAINLEVEL_OUTOF10: 0
PAINLEVEL_OUTOF10: 9
PAINLEVEL_OUTOF10: 9
PAINLEVEL_OUTOF10: 7
PAINLEVEL_OUTOF10: 0
PAINLEVEL_OUTOF10: 7
PAINLEVEL_OUTOF10: 5
PAINLEVEL_OUTOF10: 7
PAINLEVEL_OUTOF10: 4
PAINLEVEL_OUTOF10: 6
PAINLEVEL_OUTOF10: 0
PAINLEVEL_OUTOF10: 7
PAINLEVEL_OUTOF10: 8
PAINLEVEL_OUTOF10: 7
PAINLEVEL_OUTOF10: 0
PAINLEVEL_OUTOF10: 3
PAINLEVEL_OUTOF10: 8
PAINLEVEL_OUTOF10: 9
PAINLEVEL_OUTOF10: 7
PAINLEVEL_OUTOF10: 0
PAINLEVEL_OUTOF10: 0
PAINLEVEL_OUTOF10: 6
PAINLEVEL_OUTOF10: 0
PAINLEVEL_OUTOF10: 9
PAINLEVEL_OUTOF10: 6
PAINLEVEL_OUTOF10: 8
PAINLEVEL_OUTOF10: 0
PAINLEVEL_OUTOF10: 4
PAINLEVEL_OUTOF10: 8
PAINLEVEL_OUTOF10: 4
PAINLEVEL_OUTOF10: 7
PAINLEVEL_OUTOF10: 0
PAINLEVEL_OUTOF10: 7
PAINLEVEL_OUTOF10: 0
PAINLEVEL_OUTOF10: 0
PAINLEVEL_OUTOF10: 6
PAINLEVEL_OUTOF10: 8
PAINLEVEL_OUTOF10: 0
PAINLEVEL_OUTOF10: 0
PAINLEVEL_OUTOF10: 8
PAINLEVEL_OUTOF10: 6
PAINLEVEL_OUTOF10: 4
PAINLEVEL_OUTOF10: 7
PAINLEVEL_OUTOF10: 8
PAINLEVEL_OUTOF10: 0
PAINLEVEL_OUTOF10: 6
PAINLEVEL_OUTOF10: 8
PAINLEVEL_OUTOF10: 7
PAINLEVEL_OUTOF10: 7
PAINLEVEL_OUTOF10: 0
PAINLEVEL_OUTOF10: 7
PAINLEVEL_OUTOF10: 1
PAINLEVEL_OUTOF10: 4
PAINLEVEL_OUTOF10: 0
PAINLEVEL_OUTOF10: 0
PAINLEVEL_OUTOF10: 9
PAINLEVEL_OUTOF10: 8
PAINLEVEL_OUTOF10: 0
PAINLEVEL_OUTOF10: 3
PAINLEVEL_OUTOF10: 0
PAINLEVEL_OUTOF10: 7
PAINLEVEL_OUTOF10: 0
PAINLEVEL_OUTOF10: 6
PAINLEVEL_OUTOF10: 2
PAINLEVEL_OUTOF10: 6
PAINLEVEL_OUTOF10: 0
PAINLEVEL_OUTOF10: 7
PAINLEVEL_OUTOF10: 0
PAINLEVEL_OUTOF10: 7
PAINLEVEL_OUTOF10: 0
PAINLEVEL_OUTOF10: 0
PAINLEVEL_OUTOF10: 8
PAINLEVEL_OUTOF10: 6
PAINLEVEL_OUTOF10: 7
PAINLEVEL_OUTOF10: 5
PAINLEVEL_OUTOF10: 0
PAINLEVEL_OUTOF10: 2
PAINLEVEL_OUTOF10: 7
PAINLEVEL_OUTOF10: 4
PAINLEVEL_OUTOF10: 5
PAINLEVEL_OUTOF10: 6
PAINLEVEL_OUTOF10: 6
PAINLEVEL_OUTOF10: 8
PAINLEVEL_OUTOF10: 8
PAINLEVEL_OUTOF10: 7
PAINLEVEL_OUTOF10: 0
PAINLEVEL_OUTOF10: 6
PAINLEVEL_OUTOF10: 0
PAINLEVEL_OUTOF10: 0
PAINLEVEL_OUTOF10: 7
PAINLEVEL_OUTOF10: 3
PAINLEVEL_OUTOF10: 7
PAINLEVEL_OUTOF10: 4
PAINLEVEL_OUTOF10: 6
PAINLEVEL_OUTOF10: 7
PAINLEVEL_OUTOF10: 3
PAINLEVEL_OUTOF10: 0
PAINLEVEL_OUTOF10: 8
PAINLEVEL_OUTOF10: 0
PAINLEVEL_OUTOF10: 0
PAINLEVEL_OUTOF10: 7
PAINLEVEL_OUTOF10: 0
PAINLEVEL_OUTOF10: 0
PAINLEVEL_OUTOF10: 8
PAINLEVEL_OUTOF10: 7
PAINLEVEL_OUTOF10: 0
PAINLEVEL_OUTOF10: 3
PAINLEVEL_OUTOF10: 6
PAINLEVEL_OUTOF10: 7
PAINLEVEL_OUTOF10: 7
PAINLEVEL_OUTOF10: 6
PAINLEVEL_OUTOF10: 3
PAINLEVEL_OUTOF10: 0
PAINLEVEL_OUTOF10: 0
PAINLEVEL_OUTOF10: 7
PAINLEVEL_OUTOF10: 8
PAINLEVEL_OUTOF10: 6
PAINLEVEL_OUTOF10: 0
PAINLEVEL_OUTOF10: 4
PAINLEVEL_OUTOF10: 10
PAINLEVEL_OUTOF10: 7
PAINLEVEL_OUTOF10: 8
PAINLEVEL_OUTOF10: 0
PAINLEVEL_OUTOF10: 6
PAINLEVEL_OUTOF10: 8
PAINLEVEL_OUTOF10: 7
PAINLEVEL_OUTOF10: 8
PAINLEVEL_OUTOF10: 7
PAINLEVEL_OUTOF10: 8
PAINLEVEL_OUTOF10: 0
PAINLEVEL_OUTOF10: 7
PAINLEVEL_OUTOF10: 4
PAINLEVEL_OUTOF10: 0
PAINLEVEL_OUTOF10: 10
PAINLEVEL_OUTOF10: 0
PAINLEVEL_OUTOF10: 8
PAINLEVEL_OUTOF10: 5
PAINLEVEL_OUTOF10: 0
PAINLEVEL_OUTOF10: 8
PAINLEVEL_OUTOF10: 7
PAINLEVEL_OUTOF10: 0
PAINLEVEL_OUTOF10: 7
PAINLEVEL_OUTOF10: 0
PAINLEVEL_OUTOF10: 9
PAINLEVEL_OUTOF10: 7
PAINLEVEL_OUTOF10: 5
PAINLEVEL_OUTOF10: 4
PAINLEVEL_OUTOF10: 5
PAINLEVEL_OUTOF10: 0
PAINLEVEL_OUTOF10: 8
PAINLEVEL_OUTOF10: 5
PAINLEVEL_OUTOF10: 7
PAINLEVEL_OUTOF10: 0
PAINLEVEL_OUTOF10: 5
PAINLEVEL_OUTOF10: 8
PAINLEVEL_OUTOF10: 10
PAINLEVEL_OUTOF10: 7
PAINLEVEL_OUTOF10: 8
PAINLEVEL_OUTOF10: 0
PAINLEVEL_OUTOF10: 0
PAINLEVEL_OUTOF10: 7
PAINLEVEL_OUTOF10: 5
PAINLEVEL_OUTOF10: 0
PAINLEVEL_OUTOF10: 8
PAINLEVEL_OUTOF10: 7
PAINLEVEL_OUTOF10: 7
PAINLEVEL_OUTOF10: 8
PAINLEVEL_OUTOF10: 7
PAINLEVEL_OUTOF10: 6
PAINLEVEL_OUTOF10: 7
PAINLEVEL_OUTOF10: 7
PAINLEVEL_OUTOF10: 0
PAINLEVEL_OUTOF10: 8
PAINLEVEL_OUTOF10: 10
PAINLEVEL_OUTOF10: 7
PAINLEVEL_OUTOF10: 0
PAINLEVEL_OUTOF10: 6
PAINLEVEL_OUTOF10: 7
PAINLEVEL_OUTOF10: 8
PAINLEVEL_OUTOF10: 8
PAINLEVEL_OUTOF10: 0
PAINLEVEL_OUTOF10: 7
PAINLEVEL_OUTOF10: 5
PAINLEVEL_OUTOF10: 5
PAINLEVEL_OUTOF10: 0
PAINLEVEL_OUTOF10: 5
PAINLEVEL_OUTOF10: 8
PAINLEVEL_OUTOF10: 4
PAINLEVEL_OUTOF10: 6
PAINLEVEL_OUTOF10: 7
PAINLEVEL_OUTOF10: 8
PAINLEVEL_OUTOF10: 0
PAINLEVEL_OUTOF10: 5
PAINLEVEL_OUTOF10: 6
PAINLEVEL_OUTOF10: 7
PAINLEVEL_OUTOF10: 0
PAINLEVEL_OUTOF10: 7
PAINLEVEL_OUTOF10: 0
PAINLEVEL_OUTOF10: 7
PAINLEVEL_OUTOF10: 0
PAINLEVEL_OUTOF10: 8
PAINLEVEL_OUTOF10: 7
PAINLEVEL_OUTOF10: 0
PAINLEVEL_OUTOF10: 8
PAINLEVEL_OUTOF10: 7
PAINLEVEL_OUTOF10: 0
PAINLEVEL_OUTOF10: 0
PAINLEVEL_OUTOF10: 5
PAINLEVEL_OUTOF10: 8
PAINLEVEL_OUTOF10: 0
PAINLEVEL_OUTOF10: 8
PAINLEVEL_OUTOF10: 7
PAINLEVEL_OUTOF10: 7
PAINLEVEL_OUTOF10: 0
PAINLEVEL_OUTOF10: 8
PAINLEVEL_OUTOF10: 7
PAINLEVEL_OUTOF10: 0
PAINLEVEL_OUTOF10: 8
PAINLEVEL_OUTOF10: 0
PAINLEVEL_OUTOF10: 0
PAINLEVEL_OUTOF10: 9
PAINLEVEL_OUTOF10: 8
PAINLEVEL_OUTOF10: 7
PAINLEVEL_OUTOF10: 0
PAINLEVEL_OUTOF10: 9
PAINLEVEL_OUTOF10: 3
PAINLEVEL_OUTOF10: 7
PAINLEVEL_OUTOF10: 0
PAINLEVEL_OUTOF10: 8
PAINLEVEL_OUTOF10: 0
PAINLEVEL_OUTOF10: 7
PAINLEVEL_OUTOF10: 7
PAINLEVEL_OUTOF10: 0
PAINLEVEL_OUTOF10: 8
PAINLEVEL_OUTOF10: 6
PAINLEVEL_OUTOF10: 4
PAINLEVEL_OUTOF10: 5
PAINLEVEL_OUTOF10: 7
PAINLEVEL_OUTOF10: 7
PAINLEVEL_OUTOF10: 0
PAINLEVEL_OUTOF10: 8
PAINLEVEL_OUTOF10: 9
PAINLEVEL_OUTOF10: 0
PAINLEVEL_OUTOF10: 8
PAINLEVEL_OUTOF10: 4
PAINLEVEL_OUTOF10: 8
PAINLEVEL_OUTOF10: 0
PAINLEVEL_OUTOF10: 0
PAINLEVEL_OUTOF10: 5
PAINLEVEL_OUTOF10: 8
PAINLEVEL_OUTOF10: 0
PAINLEVEL_OUTOF10: 0
PAINLEVEL_OUTOF10: 6
PAINLEVEL_OUTOF10: 0
PAINLEVEL_OUTOF10: 7
PAINLEVEL_OUTOF10: 0
PAINLEVEL_OUTOF10: 7
PAINLEVEL_OUTOF10: 6
PAINLEVEL_OUTOF10: 8
PAINLEVEL_OUTOF10: 0
PAINLEVEL_OUTOF10: 3
PAINLEVEL_OUTOF10: 8
PAINLEVEL_OUTOF10: 6
PAINLEVEL_OUTOF10: 8
PAINLEVEL_OUTOF10: 7
PAINLEVEL_OUTOF10: 8
PAINLEVEL_OUTOF10: 7
PAINLEVEL_OUTOF10: 0
PAINLEVEL_OUTOF10: 2
PAINLEVEL_OUTOF10: 2
PAINLEVEL_OUTOF10: 0
PAINLEVEL_OUTOF10: 5
PAINLEVEL_OUTOF10: 6
PAINLEVEL_OUTOF10: 3
PAINLEVEL_OUTOF10: 0
PAINLEVEL_OUTOF10: 8
PAINLEVEL_OUTOF10: 7
PAINLEVEL_OUTOF10: 8
PAINLEVEL_OUTOF10: 6
PAINLEVEL_OUTOF10: 0
PAINLEVEL_OUTOF10: 5
PAINLEVEL_OUTOF10: 7
PAINLEVEL_OUTOF10: 7
PAINLEVEL_OUTOF10: 0
PAINLEVEL_OUTOF10: 7
PAINLEVEL_OUTOF10: 0
PAINLEVEL_OUTOF10: 9
PAINLEVEL_OUTOF10: 7
PAINLEVEL_OUTOF10: 0
PAINLEVEL_OUTOF10: 6
PAINLEVEL_OUTOF10: 0
PAINLEVEL_OUTOF10: 0
PAINLEVEL_OUTOF10: 6
PAINLEVEL_OUTOF10: 0
PAINLEVEL_OUTOF10: 0
PAINLEVEL_OUTOF10: 8
PAINLEVEL_OUTOF10: 0
PAINLEVEL_OUTOF10: 0
PAINLEVEL_OUTOF10: 6
PAINLEVEL_OUTOF10: 0
PAINLEVEL_OUTOF10: 6
PAINLEVEL_OUTOF10: 7
PAINLEVEL_OUTOF10: 8
PAINLEVEL_OUTOF10: 0
PAINLEVEL_OUTOF10: 6
PAINLEVEL_OUTOF10: 8
PAINLEVEL_OUTOF10: 9
PAINLEVEL_OUTOF10: 7
PAINLEVEL_OUTOF10: 10
PAINLEVEL_OUTOF10: 4
PAINLEVEL_OUTOF10: 7
PAINLEVEL_OUTOF10: 5
PAINLEVEL_OUTOF10: 6
PAINLEVEL_OUTOF10: 7
PAINLEVEL_OUTOF10: 0
PAINLEVEL_OUTOF10: 7
PAINLEVEL_OUTOF10: 9
PAINLEVEL_OUTOF10: 7
PAINLEVEL_OUTOF10: 0
PAINLEVEL_OUTOF10: 9
PAINLEVEL_OUTOF10: 6
PAINLEVEL_OUTOF10: 0
PAINLEVEL_OUTOF10: 0
PAINLEVEL_OUTOF10: 8
PAINLEVEL_OUTOF10: 6
PAINLEVEL_OUTOF10: 7
PAINLEVEL_OUTOF10: 9
PAINLEVEL_OUTOF10: 8
PAINLEVEL_OUTOF10: 9
PAINLEVEL_OUTOF10: 6
PAINLEVEL_OUTOF10: 7
PAINLEVEL_OUTOF10: 7
PAINLEVEL_OUTOF10: 0
PAINLEVEL_OUTOF10: 7
PAINLEVEL_OUTOF10: 8
PAINLEVEL_OUTOF10: 8
PAINLEVEL_OUTOF10: 7
PAINLEVEL_OUTOF10: 0
PAINLEVEL_OUTOF10: 4
PAINLEVEL_OUTOF10: 7
PAINLEVEL_OUTOF10: 8
PAINLEVEL_OUTOF10: 0
PAINLEVEL_OUTOF10: 8
PAINLEVEL_OUTOF10: 0
PAINLEVEL_OUTOF10: 5
PAINLEVEL_OUTOF10: 8
PAINLEVEL_OUTOF10: 0
PAINLEVEL_OUTOF10: 0
PAINLEVEL_OUTOF10: 4
PAINLEVEL_OUTOF10: 2
PAINLEVEL_OUTOF10: 0
PAINLEVEL_OUTOF10: 7
PAINLEVEL_OUTOF10: 5
PAINLEVEL_OUTOF10: 0
PAINLEVEL_OUTOF10: 6
PAINLEVEL_OUTOF10: 0
PAINLEVEL_OUTOF10: 5
PAINLEVEL_OUTOF10: 0
PAINLEVEL_OUTOF10: 8
PAINLEVEL_OUTOF10: 0
PAINLEVEL_OUTOF10: 0
PAINLEVEL_OUTOF10: 4
PAINLEVEL_OUTOF10: 8
PAINLEVEL_OUTOF10: 7
PAINLEVEL_OUTOF10: 7
PAINLEVEL_OUTOF10: 0
PAINLEVEL_OUTOF10: 7
PAINLEVEL_OUTOF10: 7
PAINLEVEL_OUTOF10: 6
PAINLEVEL_OUTOF10: 0
PAINLEVEL_OUTOF10: 5
PAINLEVEL_OUTOF10: 5
PAINLEVEL_OUTOF10: 0
PAINLEVEL_OUTOF10: 0
PAINLEVEL_OUTOF10: 7
PAINLEVEL_OUTOF10: 0
PAINLEVEL_OUTOF10: 9
PAINLEVEL_OUTOF10: 7
PAINLEVEL_OUTOF10: 10
PAINLEVEL_OUTOF10: 10
PAINLEVEL_OUTOF10: 7
PAINLEVEL_OUTOF10: 0
PAINLEVEL_OUTOF10: 8
PAINLEVEL_OUTOF10: 0
PAINLEVEL_OUTOF10: 5
PAINLEVEL_OUTOF10: 8
PAINLEVEL_OUTOF10: 0
PAINLEVEL_OUTOF10: 7
PAINLEVEL_OUTOF10: 6
PAINLEVEL_OUTOF10: 3
PAINLEVEL_OUTOF10: 8
PAINLEVEL_OUTOF10: 6
PAINLEVEL_OUTOF10: 8
PAINLEVEL_OUTOF10: 0
PAINLEVEL_OUTOF10: 9
PAINLEVEL_OUTOF10: 0
PAINLEVEL_OUTOF10: 5
PAINLEVEL_OUTOF10: 0
PAINLEVEL_OUTOF10: 4
PAINLEVEL_OUTOF10: 8
PAINLEVEL_OUTOF10: 6
PAINLEVEL_OUTOF10: 0
PAINLEVEL_OUTOF10: 6
PAINLEVEL_OUTOF10: 8
PAINLEVEL_OUTOF10: 7
PAINLEVEL_OUTOF10: 8
PAINLEVEL_OUTOF10: 0
PAINLEVEL_OUTOF10: 7
PAINLEVEL_OUTOF10: 0
PAINLEVEL_OUTOF10: 7
PAINLEVEL_OUTOF10: 8
PAINLEVEL_OUTOF10: 5

## 2022-01-01 ASSESSMENT — PULMONARY FUNCTION TESTS
PIF_VALUE: 19
PIF_VALUE: 19
PIF_VALUE: 18
PIF_VALUE: 19
PIF_VALUE: 26
PIF_VALUE: 40
PIF_VALUE: 16
PIF_VALUE: 28
PIF_VALUE: 20
PIF_VALUE: 18
PIF_VALUE: 22
PIF_VALUE: 19
PIF_VALUE: 22
PIF_VALUE: 22
PIF_VALUE: 18
PIF_VALUE: 16
PIF_VALUE: 25
PIF_VALUE: 4
PIF_VALUE: 19
PIF_VALUE: 21
PIF_VALUE: 23
PIF_VALUE: 26
PIF_VALUE: 29
PIF_VALUE: 0
PIF_VALUE: 18
PIF_VALUE: 21
PIF_VALUE: 18
PIF_VALUE: 20
PIF_VALUE: 21
PIF_VALUE: 0
PIF_VALUE: 19
PIF_VALUE: 21
PIF_VALUE: 18
PIF_VALUE: 22
PIF_VALUE: 1
PIF_VALUE: 15
PIF_VALUE: 32
PIF_VALUE: 37
PIF_VALUE: 21
PIF_VALUE: 19
PIF_VALUE: 25
PIF_VALUE: 24
PIF_VALUE: 18
PIF_VALUE: 24
PIF_VALUE: 26
PIF_VALUE: 28
PIF_VALUE: 25
PIF_VALUE: 20
PIF_VALUE: 0
PIF_VALUE: 18
PIF_VALUE: 20
PIF_VALUE: 22
PIF_VALUE: 19
PIF_VALUE: 31
PIF_VALUE: 1
PIF_VALUE: 18
PIF_VALUE: 21
PIF_VALUE: 18
PIF_VALUE: 27
PIF_VALUE: 26
PIF_VALUE: 31
PIF_VALUE: 42
PIF_VALUE: 19
PIF_VALUE: 40
PIF_VALUE: 32
PIF_VALUE: 36
PIF_VALUE: 18
PIF_VALUE: 25
PIF_VALUE: 18
PIF_VALUE: 27
PIF_VALUE: 21
PIF_VALUE: 18
PIF_VALUE: 18
PIF_VALUE: 22
PIF_VALUE: 24
PIF_VALUE: 22
PIF_VALUE: 18
PIF_VALUE: 36
PIF_VALUE: 20
PIF_VALUE: 20
PIF_VALUE: 19
PIF_VALUE: 14
PIF_VALUE: 23
PIF_VALUE: 23
PIF_VALUE: 19
PIF_VALUE: 1
PIF_VALUE: 28
PIF_VALUE: 1
PIF_VALUE: 18
PIF_VALUE: 19
PIF_VALUE: 20
PIF_VALUE: 25
PIF_VALUE: 19
PIF_VALUE: 24
PIF_VALUE: 0
PIF_VALUE: 27
PIF_VALUE: 30
PIF_VALUE: 13
PIF_VALUE: 23
PIF_VALUE: 25
PIF_VALUE: 36
PIF_VALUE: 24
PIF_VALUE: 24
PIF_VALUE: 21
PIF_VALUE: 27
PIF_VALUE: 15
PIF_VALUE: 19
PIF_VALUE: 23
PIF_VALUE: 20
PIF_VALUE: 19
PIF_VALUE: 19
PIF_VALUE: 25
PIF_VALUE: 20
PIF_VALUE: 20
PIF_VALUE: 18
PIF_VALUE: 24
PIF_VALUE: 18
PIF_VALUE: 22
PIF_VALUE: 20
PIF_VALUE: 19
PIF_VALUE: 21
PIF_VALUE: 19
PIF_VALUE: 14
PIF_VALUE: 28
PIF_VALUE: 15
PIF_VALUE: 18
PIF_VALUE: 19
PIF_VALUE: 0
PIF_VALUE: 27
PIF_VALUE: 18
PIF_VALUE: 0
PIF_VALUE: 24
PIF_VALUE: 19
PIF_VALUE: 18
PIF_VALUE: 31
PIF_VALUE: 21
PIF_VALUE: 18
PIF_VALUE: 19
PIF_VALUE: 22
PIF_VALUE: 18
PIF_VALUE: 30
PIF_VALUE: 28
PIF_VALUE: 11
PIF_VALUE: 22
PIF_VALUE: 29
PIF_VALUE: 31
PIF_VALUE: 21
PIF_VALUE: 19
PIF_VALUE: 1
PIF_VALUE: 2
PIF_VALUE: 26
PIF_VALUE: 32
PIF_VALUE: 23
PIF_VALUE: 5
PIF_VALUE: 1
PIF_VALUE: 21
PIF_VALUE: 0
PIF_VALUE: 19
PIF_VALUE: 31
PIF_VALUE: 21
PIF_VALUE: 18
PIF_VALUE: 19
PIF_VALUE: 19
PIF_VALUE: 18
PIF_VALUE: 18
PIF_VALUE: 21
PIF_VALUE: 2
PIF_VALUE: 18
PIF_VALUE: 22
PIF_VALUE: 1
PIF_VALUE: 20
PIF_VALUE: 29
PIF_VALUE: 18
PIF_VALUE: 20
PIF_VALUE: 30
PIF_VALUE: 27
PIF_VALUE: 21
PIF_VALUE: 20
PIF_VALUE: 20
PIF_VALUE: 23
PIF_VALUE: 22
PIF_VALUE: 1
PIF_VALUE: 28
PIF_VALUE: 21
PIF_VALUE: 19
PIF_VALUE: 18
PIF_VALUE: 23
PIF_VALUE: 17
PIF_VALUE: 18
PIF_VALUE: 22
PIF_VALUE: 19
PIF_VALUE: 18
PIF_VALUE: 19
PIF_VALUE: 40
PIF_VALUE: 21
PIF_VALUE: 33
PIF_VALUE: 14
PIF_VALUE: 18
PIF_VALUE: 28
PIF_VALUE: 21
PIF_VALUE: 19
PIF_VALUE: 6
PIF_VALUE: 0
PIF_VALUE: 18
PIF_VALUE: 18
PIF_VALUE: 34
PIF_VALUE: 34
PIF_VALUE: 30
PIF_VALUE: 1
PIF_VALUE: 18
PIF_VALUE: 18
PIF_VALUE: 0
PIF_VALUE: 19
PIF_VALUE: 37
PIF_VALUE: 30
PIF_VALUE: 14
PIF_VALUE: 21
PIF_VALUE: 18
PIF_VALUE: 19
PIF_VALUE: 19
PIF_VALUE: 22
PIF_VALUE: 21
PIF_VALUE: 19
PIF_VALUE: 23
PIF_VALUE: 18
PIF_VALUE: 15
PIF_VALUE: 28
PIF_VALUE: 27
PIF_VALUE: 23
PIF_VALUE: 25
PIF_VALUE: 12
PIF_VALUE: 16
PIF_VALUE: 2
PIF_VALUE: 32
PIF_VALUE: 23
PIF_VALUE: 22
PIF_VALUE: 25
PIF_VALUE: 23
PIF_VALUE: 23
PIF_VALUE: 22
PIF_VALUE: 19
PIF_VALUE: 18
PIF_VALUE: 20
PIF_VALUE: 23
PIF_VALUE: 19
PIF_VALUE: 17
PIF_VALUE: 35
PIF_VALUE: 18
PIF_VALUE: 15
PIF_VALUE: 22
PIF_VALUE: 19
PIF_VALUE: 18
PIF_VALUE: 21
PIF_VALUE: 18
PIF_VALUE: 1
PIF_VALUE: 19
PIF_VALUE: 21
PIF_VALUE: 21
PIF_VALUE: 26
PIF_VALUE: 18
PIF_VALUE: 24
PIF_VALUE: 21
PIF_VALUE: 23
PIF_VALUE: 20
PIF_VALUE: 32
PIF_VALUE: 17
PIF_VALUE: 23
PIF_VALUE: 27
PIF_VALUE: 20
PIF_VALUE: 18
PIF_VALUE: 33
PIF_VALUE: 39
PIF_VALUE: 19
PIF_VALUE: 15
PIF_VALUE: 18
PIF_VALUE: 19
PIF_VALUE: 34
PIF_VALUE: 40
PIF_VALUE: 14
PIF_VALUE: 39
PIF_VALUE: 21
PIF_VALUE: 19
PIF_VALUE: 31
PIF_VALUE: 33
PIF_VALUE: 16
PIF_VALUE: 23
PIF_VALUE: 27
PIF_VALUE: 18
PIF_VALUE: 24
PIF_VALUE: 17
PIF_VALUE: 21
PIF_VALUE: 0
PIF_VALUE: 31
PIF_VALUE: 24
PIF_VALUE: 23
PIF_VALUE: 20
PIF_VALUE: 24
PIF_VALUE: 18
PIF_VALUE: 24
PIF_VALUE: 14
PIF_VALUE: 1
PIF_VALUE: 22
PIF_VALUE: 19
PIF_VALUE: 24
PIF_VALUE: 23
PIF_VALUE: 18
PIF_VALUE: 32
PIF_VALUE: 18
PIF_VALUE: 12
PIF_VALUE: 17
PIF_VALUE: 16
PIF_VALUE: 20
PIF_VALUE: 1
PIF_VALUE: 18
PIF_VALUE: 1
PIF_VALUE: 18
PIF_VALUE: 18
PIF_VALUE: 25
PIF_VALUE: 17
PIF_VALUE: 1
PIF_VALUE: 18
PIF_VALUE: 1
PIF_VALUE: 15
PIF_VALUE: 18
PIF_VALUE: 1
PIF_VALUE: 24
PIF_VALUE: 18
PIF_VALUE: 22
PIF_VALUE: 19
PIF_VALUE: 23
PIF_VALUE: 18
PIF_VALUE: 21
PIF_VALUE: 0
PIF_VALUE: 20
PIF_VALUE: 23
PIF_VALUE: 21
PIF_VALUE: 18
PIF_VALUE: 14
PIF_VALUE: 34
PIF_VALUE: 28
PIF_VALUE: 18
PIF_VALUE: 23
PIF_VALUE: 29
PIF_VALUE: 18
PIF_VALUE: 20
PIF_VALUE: 14
PIF_VALUE: 19
PIF_VALUE: 18
PIF_VALUE: 25
PIF_VALUE: 22
PIF_VALUE: 27
PIF_VALUE: 19
PIF_VALUE: 21
PIF_VALUE: 19
PIF_VALUE: 18
PIF_VALUE: 28
PIF_VALUE: 18
PIF_VALUE: 18
PIF_VALUE: 20
PIF_VALUE: 14
PIF_VALUE: 12
PIF_VALUE: 34
PIF_VALUE: 20
PIF_VALUE: 21
PIF_VALUE: 23
PIF_VALUE: 17
PIF_VALUE: 18
PIF_VALUE: 30
PIF_VALUE: 16
PIF_VALUE: 23
PIF_VALUE: 27
PIF_VALUE: 18
PIF_VALUE: 17
PIF_VALUE: 28
PIF_VALUE: 23
PIF_VALUE: 17
PIF_VALUE: 0
PIF_VALUE: 31
PIF_VALUE: 23
PIF_VALUE: 16
PIF_VALUE: 27
PIF_VALUE: 38
PIF_VALUE: 14
PIF_VALUE: 19
PIF_VALUE: 24
PIF_VALUE: 16
PIF_VALUE: 19
PIF_VALUE: 23
PIF_VALUE: 2
PIF_VALUE: 21
PIF_VALUE: 37
PIF_VALUE: 22
PIF_VALUE: 26
PIF_VALUE: 19
PIF_VALUE: 24
PIF_VALUE: 21
PIF_VALUE: 0
PIF_VALUE: 26
PIF_VALUE: 21
PIF_VALUE: 17
PIF_VALUE: 18
PIF_VALUE: 20
PIF_VALUE: 1
PIF_VALUE: 25
PIF_VALUE: 19
PIF_VALUE: 30
PIF_VALUE: 15
PIF_VALUE: 22
PIF_VALUE: 18
PIF_VALUE: 16
PIF_VALUE: 17
PIF_VALUE: 25
PIF_VALUE: 26
PIF_VALUE: 20
PIF_VALUE: 16
PIF_VALUE: 19
PIF_VALUE: 19
PIF_VALUE: 23
PIF_VALUE: 14
PIF_VALUE: 24
PIF_VALUE: 18
PIF_VALUE: 18
PIF_VALUE: 21
PIF_VALUE: 24
PIF_VALUE: 16
PIF_VALUE: 20
PIF_VALUE: 23
PIF_VALUE: 18
PIF_VALUE: 31
PIF_VALUE: 22
PIF_VALUE: 45
PIF_VALUE: 19
PIF_VALUE: 4
PIF_VALUE: 15
PIF_VALUE: 1
PIF_VALUE: 34
PIF_VALUE: 20
PIF_VALUE: 0
PIF_VALUE: 22
PIF_VALUE: 1
PIF_VALUE: 23
PIF_VALUE: 22
PIF_VALUE: 41
PIF_VALUE: 19
PIF_VALUE: 1
PIF_VALUE: 22
PIF_VALUE: 27
PIF_VALUE: 19
PIF_VALUE: 34
PIF_VALUE: 36
PIF_VALUE: 19
PIF_VALUE: 18
PIF_VALUE: 18
PIF_VALUE: 28
PIF_VALUE: 19
PIF_VALUE: 2
PIF_VALUE: 5
PIF_VALUE: 26
PIF_VALUE: 18
PIF_VALUE: 24
PIF_VALUE: 26
PIF_VALUE: 22
PIF_VALUE: 19
PIF_VALUE: 30
PIF_VALUE: 45
PIF_VALUE: 19
PIF_VALUE: 34
PIF_VALUE: 33
PIF_VALUE: 18
PIF_VALUE: 30
PIF_VALUE: 28
PIF_VALUE: 0
PIF_VALUE: 23
PIF_VALUE: 22
PIF_VALUE: 18
PIF_VALUE: 1
PIF_VALUE: 21
PIF_VALUE: 18
PIF_VALUE: 18
PIF_VALUE: 22
PIF_VALUE: 21
PIF_VALUE: 22
PIF_VALUE: 22
PIF_VALUE: 20
PIF_VALUE: 17
PIF_VALUE: 23
PIF_VALUE: 18
PIF_VALUE: 1
PIF_VALUE: 27
PIF_VALUE: 42
PIF_VALUE: 2
PIF_VALUE: 12
PIF_VALUE: 32
PIF_VALUE: 23
PIF_VALUE: 19
PIF_VALUE: 25
PIF_VALUE: 5
PIF_VALUE: 22
PIF_VALUE: 19
PIF_VALUE: 18
PIF_VALUE: 19
PIF_VALUE: 16
PIF_VALUE: 26
PIF_VALUE: 29
PIF_VALUE: 18
PIF_VALUE: 15
PIF_VALUE: 18
PIF_VALUE: 20
PIF_VALUE: 19
PIF_VALUE: 21
PIF_VALUE: 30
PIF_VALUE: 18
PIF_VALUE: 17
PIF_VALUE: 33
PIF_VALUE: 11
PIF_VALUE: 22
PIF_VALUE: 28
PIF_VALUE: 45
PIF_VALUE: 18
PIF_VALUE: 1
PIF_VALUE: 19
PIF_VALUE: 31
PIF_VALUE: 18
PIF_VALUE: 1
PIF_VALUE: 18
PIF_VALUE: 23
PIF_VALUE: 18
PIF_VALUE: 17
PIF_VALUE: 15
PIF_VALUE: 1
PIF_VALUE: 22
PIF_VALUE: 25
PIF_VALUE: 19
PIF_VALUE: 18
PIF_VALUE: 22
PIF_VALUE: 27
PIF_VALUE: 15
PIF_VALUE: 22
PIF_VALUE: 25
PIF_VALUE: 22
PIF_VALUE: 19
PIF_VALUE: 32
PIF_VALUE: 18
PIF_VALUE: 19
PIF_VALUE: 19
PIF_VALUE: 2
PIF_VALUE: 15
PIF_VALUE: 12
PIF_VALUE: 15
PIF_VALUE: 32
PIF_VALUE: 14
PIF_VALUE: 17
PIF_VALUE: 23
PIF_VALUE: 9
PIF_VALUE: 27
PIF_VALUE: 22
PIF_VALUE: 26
PIF_VALUE: 24
PIF_VALUE: 18
PIF_VALUE: 1
PIF_VALUE: 41
PIF_VALUE: 14
PIF_VALUE: 17
PIF_VALUE: 34
PIF_VALUE: 28
PIF_VALUE: 15
PIF_VALUE: 20
PIF_VALUE: 14
PIF_VALUE: 19
PIF_VALUE: 18
PIF_VALUE: 19
PIF_VALUE: 18
PIF_VALUE: 27
PIF_VALUE: 16
PIF_VALUE: 20
PIF_VALUE: 24
PIF_VALUE: 21
PIF_VALUE: 23
PIF_VALUE: 16
PIF_VALUE: 1
PIF_VALUE: 5
PIF_VALUE: 44
PIF_VALUE: 26
PIF_VALUE: 18
PIF_VALUE: 31
PIF_VALUE: 22
PIF_VALUE: 24
PIF_VALUE: 18
PIF_VALUE: 11
PIF_VALUE: 17
PIF_VALUE: 22
PIF_VALUE: 20
PIF_VALUE: 18
PIF_VALUE: 23
PIF_VALUE: 20
PIF_VALUE: 15
PIF_VALUE: 26
PIF_VALUE: 37
PIF_VALUE: 1
PIF_VALUE: 22
PIF_VALUE: 1
PIF_VALUE: 23
PIF_VALUE: 18
PIF_VALUE: 23
PIF_VALUE: 15
PIF_VALUE: 27
PIF_VALUE: 18
PIF_VALUE: 38
PIF_VALUE: 1
PIF_VALUE: 21
PIF_VALUE: 1
PIF_VALUE: 18
PIF_VALUE: 22
PIF_VALUE: 25
PIF_VALUE: 18
PIF_VALUE: 1
PIF_VALUE: 19
PIF_VALUE: 20
PIF_VALUE: 22
PIF_VALUE: 1
PIF_VALUE: 23
PIF_VALUE: 18
PIF_VALUE: 25
PIF_VALUE: 21
PIF_VALUE: 18
PIF_VALUE: 39
PIF_VALUE: 27
PIF_VALUE: 39
PIF_VALUE: 19
PIF_VALUE: 3
PIF_VALUE: 1
PIF_VALUE: 18
PIF_VALUE: 23
PIF_VALUE: 18
PIF_VALUE: 13
PIF_VALUE: 18
PIF_VALUE: 1
PIF_VALUE: 21
PIF_VALUE: 20
PIF_VALUE: 22
PIF_VALUE: 19
PIF_VALUE: 19
PIF_VALUE: 22
PIF_VALUE: 27
PIF_VALUE: 42
PIF_VALUE: 27
PIF_VALUE: 1
PIF_VALUE: 23
PIF_VALUE: 21
PIF_VALUE: 21
PIF_VALUE: 17
PIF_VALUE: 19
PIF_VALUE: 1
PIF_VALUE: 27
PIF_VALUE: 18
PIF_VALUE: 21
PIF_VALUE: 18
PIF_VALUE: 19
PIF_VALUE: 19
PIF_VALUE: 22
PIF_VALUE: 36
PIF_VALUE: 15
PIF_VALUE: 28
PIF_VALUE: 37
PIF_VALUE: 14
PIF_VALUE: 18
PIF_VALUE: 33
PIF_VALUE: 22
PIF_VALUE: 1
PIF_VALUE: 22
PIF_VALUE: 16
PIF_VALUE: 22
PIF_VALUE: 18
PIF_VALUE: 24
PIF_VALUE: 26
PIF_VALUE: 17
PIF_VALUE: 27
PIF_VALUE: 18
PIF_VALUE: 19
PIF_VALUE: 1
PIF_VALUE: 25
PIF_VALUE: 25
PIF_VALUE: 18
PIF_VALUE: 18
PIF_VALUE: 22
PIF_VALUE: 1
PIF_VALUE: 30
PIF_VALUE: 18
PIF_VALUE: 35
PIF_VALUE: 24
PIF_VALUE: 19
PIF_VALUE: 0
PIF_VALUE: 1
PIF_VALUE: 28
PIF_VALUE: 18
PIF_VALUE: 21
PIF_VALUE: 26
PIF_VALUE: 19
PIF_VALUE: 23
PIF_VALUE: 19
PIF_VALUE: 18
PIF_VALUE: 17
PIF_VALUE: 22
PIF_VALUE: 16
PIF_VALUE: 19
PIF_VALUE: 23
PIF_VALUE: 18
PIF_VALUE: 23
PIF_VALUE: 22
PIF_VALUE: 26
PIF_VALUE: 19
PIF_VALUE: 45
PIF_VALUE: 26
PIF_VALUE: 24
PIF_VALUE: 31
PIF_VALUE: 36
PIF_VALUE: 19
PIF_VALUE: 34
PIF_VALUE: 18
PIF_VALUE: 22
PIF_VALUE: 20
PIF_VALUE: 24
PIF_VALUE: 20
PIF_VALUE: 19
PIF_VALUE: 27
PIF_VALUE: 21
PIF_VALUE: 20
PIF_VALUE: 22
PIF_VALUE: 28
PIF_VALUE: 19
PIF_VALUE: 19
PIF_VALUE: 21
PIF_VALUE: 1
PIF_VALUE: 27
PIF_VALUE: 20
PIF_VALUE: 38

## 2022-01-01 ASSESSMENT — PAIN DESCRIPTION - PAIN TYPE
TYPE: CHRONIC PAIN
TYPE: ACUTE PAIN
TYPE: ACUTE PAIN;CHRONIC PAIN
TYPE: ACUTE PAIN
TYPE: CHRONIC PAIN;ACUTE PAIN
TYPE: ACUTE PAIN
TYPE: ACUTE PAIN;CHRONIC PAIN
TYPE: ACUTE PAIN
TYPE: ACUTE PAIN
TYPE: CHRONIC PAIN;ACUTE PAIN
TYPE: ACUTE PAIN
TYPE: ACUTE PAIN;CHRONIC PAIN
TYPE: ACUTE PAIN;CHRONIC PAIN
TYPE: ACUTE PAIN
TYPE: ACUTE PAIN;CHRONIC PAIN
TYPE: ACUTE PAIN
TYPE: CHRONIC PAIN
TYPE: ACUTE PAIN
TYPE: ACUTE PAIN
TYPE: CHRONIC PAIN
TYPE: CHRONIC PAIN
TYPE: ACUTE PAIN
TYPE: ACUTE PAIN;CHRONIC PAIN
TYPE: ACUTE PAIN
TYPE: CHRONIC PAIN
TYPE: ACUTE PAIN
TYPE: ACUTE PAIN;CHRONIC PAIN
TYPE: ACUTE PAIN
TYPE: CHRONIC PAIN
TYPE: CHRONIC PAIN
TYPE: ACUTE PAIN
TYPE: CHRONIC PAIN
TYPE: CHRONIC PAIN
TYPE: ACUTE PAIN;CHRONIC PAIN
TYPE: ACUTE PAIN
TYPE: ACUTE PAIN
TYPE: CHRONIC PAIN
TYPE: SURGICAL PAIN
TYPE: ACUTE PAIN
TYPE: CHRONIC PAIN
TYPE: ACUTE PAIN;CHRONIC PAIN
TYPE: ACUTE PAIN

## 2022-01-01 ASSESSMENT — PAIN DESCRIPTION - FREQUENCY
FREQUENCY: CONTINUOUS
FREQUENCY: INTERMITTENT
FREQUENCY: CONTINUOUS
FREQUENCY: INTERMITTENT
FREQUENCY: CONTINUOUS
FREQUENCY: INTERMITTENT
FREQUENCY: INTERMITTENT
FREQUENCY: CONTINUOUS
FREQUENCY: INTERMITTENT
FREQUENCY: CONTINUOUS
FREQUENCY: CONTINUOUS
FREQUENCY: INTERMITTENT
FREQUENCY: CONTINUOUS
FREQUENCY: INTERMITTENT
FREQUENCY: CONTINUOUS
FREQUENCY: INTERMITTENT
FREQUENCY: CONTINUOUS
FREQUENCY: INTERMITTENT
FREQUENCY: CONTINUOUS
FREQUENCY: INTERMITTENT
FREQUENCY: INTERMITTENT
FREQUENCY: CONTINUOUS
FREQUENCY: INTERMITTENT
FREQUENCY: INTERMITTENT
FREQUENCY: CONTINUOUS

## 2022-01-01 ASSESSMENT — PAIN - FUNCTIONAL ASSESSMENT
PAIN_FUNCTIONAL_ASSESSMENT: PREVENTS OR INTERFERES SOME ACTIVE ACTIVITIES AND ADLS
PAIN_FUNCTIONAL_ASSESSMENT: ACTIVITIES ARE NOT PREVENTED
PAIN_FUNCTIONAL_ASSESSMENT: PREVENTS OR INTERFERES SOME ACTIVE ACTIVITIES AND ADLS
PAIN_FUNCTIONAL_ASSESSMENT: PREVENTS OR INTERFERES WITH MANY ACTIVE NOT PASSIVE ACTIVITIES
PAIN_FUNCTIONAL_ASSESSMENT: PREVENTS OR INTERFERES SOME ACTIVE ACTIVITIES AND ADLS
PAIN_FUNCTIONAL_ASSESSMENT: PREVENTS OR INTERFERES SOME ACTIVE ACTIVITIES AND ADLS
PAIN_FUNCTIONAL_ASSESSMENT: PREVENTS OR INTERFERES WITH MANY ACTIVE NOT PASSIVE ACTIVITIES
PAIN_FUNCTIONAL_ASSESSMENT: ACTIVITIES ARE NOT PREVENTED
PAIN_FUNCTIONAL_ASSESSMENT: PREVENTS OR INTERFERES SOME ACTIVE ACTIVITIES AND ADLS
PAIN_FUNCTIONAL_ASSESSMENT: PREVENTS OR INTERFERES SOME ACTIVE ACTIVITIES AND ADLS
PAIN_FUNCTIONAL_ASSESSMENT: ACTIVITIES ARE NOT PREVENTED
PAIN_FUNCTIONAL_ASSESSMENT: PREVENTS OR INTERFERES SOME ACTIVE ACTIVITIES AND ADLS
PAIN_FUNCTIONAL_ASSESSMENT: ACTIVITIES ARE NOT PREVENTED
PAIN_FUNCTIONAL_ASSESSMENT: PREVENTS OR INTERFERES SOME ACTIVE ACTIVITIES AND ADLS
PAIN_FUNCTIONAL_ASSESSMENT: PREVENTS OR INTERFERES SOME ACTIVE ACTIVITIES AND ADLS
PAIN_FUNCTIONAL_ASSESSMENT: ACTIVITIES ARE NOT PREVENTED
PAIN_FUNCTIONAL_ASSESSMENT: PREVENTS OR INTERFERES SOME ACTIVE ACTIVITIES AND ADLS
PAIN_FUNCTIONAL_ASSESSMENT: PREVENTS OR INTERFERES SOME ACTIVE ACTIVITIES AND ADLS
PAIN_FUNCTIONAL_ASSESSMENT: PREVENTS OR INTERFERES WITH MANY ACTIVE NOT PASSIVE ACTIVITIES
PAIN_FUNCTIONAL_ASSESSMENT: ACTIVITIES ARE NOT PREVENTED
PAIN_FUNCTIONAL_ASSESSMENT: PREVENTS OR INTERFERES SOME ACTIVE ACTIVITIES AND ADLS
PAIN_FUNCTIONAL_ASSESSMENT: PREVENTS OR INTERFERES WITH MANY ACTIVE NOT PASSIVE ACTIVITIES
PAIN_FUNCTIONAL_ASSESSMENT: ACTIVITIES ARE NOT PREVENTED
PAIN_FUNCTIONAL_ASSESSMENT: ACTIVITIES ARE NOT PREVENTED
PAIN_FUNCTIONAL_ASSESSMENT: PREVENTS OR INTERFERES SOME ACTIVE ACTIVITIES AND ADLS
PAIN_FUNCTIONAL_ASSESSMENT: ACTIVITIES ARE NOT PREVENTED
PAIN_FUNCTIONAL_ASSESSMENT: PREVENTS OR INTERFERES SOME ACTIVE ACTIVITIES AND ADLS
PAIN_FUNCTIONAL_ASSESSMENT: ACTIVITIES ARE NOT PREVENTED
PAIN_FUNCTIONAL_ASSESSMENT: ACTIVITIES ARE NOT PREVENTED

## 2022-01-01 ASSESSMENT — PAIN DESCRIPTION - LOCATION
LOCATION: BUTTOCKS
LOCATION: BACK
LOCATION: BUTTOCKS
LOCATION: BACK
LOCATION: GENERALIZED
LOCATION: SACRUM;BACK
LOCATION: BACK;LEG;HIP
LOCATION: BACK
LOCATION: BACK;BUTTOCKS
LOCATION: BACK;HIP
LOCATION: BACK
LOCATION: BACK;BUTTOCKS
LOCATION: BUTTOCKS
LOCATION: KNEE
LOCATION: BUTTOCKS
LOCATION: BUTTOCKS
LOCATION: HIP
LOCATION: BACK;HIP
LOCATION: BUTTOCKS;SACRUM
LOCATION: ABDOMEN
LOCATION: BACK;HIP
LOCATION: BACK;BUTTOCKS
LOCATION: LEG
LOCATION: BACK
LOCATION: BACK;SACRUM
LOCATION: BACK;HIP
LOCATION: COCCYX
LOCATION: BUTTOCKS
LOCATION: GENERALIZED
LOCATION: BACK;BUTTOCKS;HIP
LOCATION: BACK;HIP
LOCATION: BUTTOCKS
LOCATION: BACK;BUTTOCKS
LOCATION: BUTTOCKS
LOCATION: COCCYX;BACK;HIP
LOCATION: BACK
LOCATION: BACK;HIP
LOCATION: SACRUM;BUTTOCKS
LOCATION: BACK;SACRUM
LOCATION: BACK;HIP
LOCATION: HIP
LOCATION: SHOULDER
LOCATION: GENERALIZED
LOCATION: GENERALIZED
LOCATION: BACK
LOCATION: BACK;COCCYX
LOCATION: BACK;HIP
LOCATION: BACK;HIP
LOCATION: BACK
LOCATION: BACK;HIP
LOCATION: BACK
LOCATION: BACK;HIP
LOCATION: HIP;COCCYX
LOCATION: BACK;HIP
LOCATION: BACK;HIP;HAND
LOCATION: BACK;HIP
LOCATION: BACK;BUTTOCKS;HIP
LOCATION: BACK;HIP
LOCATION: ABDOMEN
LOCATION: HIP
LOCATION: BACK;BUTTOCKS;HIP
LOCATION: BACK;BUTTOCKS;HIP
LOCATION: SHOULDER
LOCATION: BACK;BUTTOCKS;GENERALIZED
LOCATION: COCCYX;BUTTOCKS;BACK
LOCATION: BACK;BUTTOCKS
LOCATION: BACK;HIP
LOCATION: BACK;HIP
LOCATION: BACK;BUTTOCKS;HIP
LOCATION: FINGER (COMMENT WHICH ONE)
LOCATION: BACK;HIP
LOCATION: GENERALIZED
LOCATION: BACK;BUTTOCKS;HIP
LOCATION: BACK;HIP;BUTTOCKS
LOCATION: SHOULDER
LOCATION: BACK;HIP
LOCATION: BACK;BUTTOCKS
LOCATION: BACK;HIP
LOCATION: BUTTOCKS;SACRUM
LOCATION: COCCYX;HIP
LOCATION: COCCYX;HIP
LOCATION: HIP;BACK;NECK
LOCATION: BUTTOCKS
LOCATION: BACK;HIP
LOCATION: SACRUM
LOCATION: BACK;BUTTOCKS
LOCATION: BACK
LOCATION: BACK;BUTTOCKS
LOCATION: BACK;BUTTOCKS
LOCATION: HIP;BACK
LOCATION: BACK;BUTTOCKS;HIP
LOCATION: BACK
LOCATION: BACK
LOCATION: ARM
LOCATION: BACK;BUTTOCKS
LOCATION: ABDOMEN
LOCATION: SACRUM;BACK;HIP
LOCATION: BACK;BUTTOCKS
LOCATION: CHEST
LOCATION: BUTTOCKS;COCCYX
LOCATION: COCCYX;HIP
LOCATION: GENERALIZED
LOCATION: GENERALIZED
LOCATION: BACK;BUTTOCKS;HIP
LOCATION: BACK;HIP;ARM
LOCATION: HIP;LEG
LOCATION: BACK;HIP
LOCATION: BUTTOCKS
LOCATION: BACK;HIP
LOCATION: BACK;HIP
LOCATION: HEAD
LOCATION: BACK;HIP;ARM
LOCATION: HIP
LOCATION: BACK
LOCATION: BACK;BUTTOCKS;HIP
LOCATION: GENERALIZED
LOCATION: BACK;BUTTOCKS;HIP
LOCATION: BACK;HIP
LOCATION: BACK;HIP
LOCATION: SACRUM;BUTTOCKS
LOCATION: SACRUM;BUTTOCKS
LOCATION: BACK
LOCATION: BACK;HIP;FOOT
LOCATION: BACK
LOCATION: GENERALIZED
LOCATION: BUTTOCKS;HIP
LOCATION: OTHER (COMMENT)
LOCATION: BUTTOCKS
LOCATION: BACK;HIP

## 2022-01-01 ASSESSMENT — LIFESTYLE VARIABLES
SMOKING_STATUS: 1
SMOKING_STATUS: 0
SMOKING_STATUS: 1

## 2022-01-01 ASSESSMENT — PAIN DESCRIPTION - PROGRESSION
CLINICAL_PROGRESSION: GRADUALLY WORSENING
CLINICAL_PROGRESSION: GRADUALLY WORSENING
CLINICAL_PROGRESSION: NOT CHANGED
CLINICAL_PROGRESSION: GRADUALLY WORSENING
CLINICAL_PROGRESSION: NOT CHANGED
CLINICAL_PROGRESSION: NOT CHANGED
CLINICAL_PROGRESSION: GRADUALLY WORSENING
CLINICAL_PROGRESSION: NOT CHANGED
CLINICAL_PROGRESSION: GRADUALLY WORSENING
CLINICAL_PROGRESSION: GRADUALLY WORSENING
CLINICAL_PROGRESSION: NOT CHANGED
CLINICAL_PROGRESSION: GRADUALLY WORSENING
CLINICAL_PROGRESSION: GRADUALLY WORSENING
CLINICAL_PROGRESSION: NOT CHANGED
CLINICAL_PROGRESSION: NOT CHANGED
CLINICAL_PROGRESSION: GRADUALLY WORSENING
CLINICAL_PROGRESSION: GRADUALLY WORSENING
CLINICAL_PROGRESSION: NOT CHANGED
CLINICAL_PROGRESSION: NOT CHANGED
CLINICAL_PROGRESSION: GRADUALLY WORSENING
CLINICAL_PROGRESSION: GRADUALLY WORSENING
CLINICAL_PROGRESSION: NOT CHANGED
CLINICAL_PROGRESSION: GRADUALLY WORSENING
CLINICAL_PROGRESSION: NOT CHANGED
CLINICAL_PROGRESSION: GRADUALLY IMPROVING
CLINICAL_PROGRESSION: GRADUALLY WORSENING
CLINICAL_PROGRESSION: GRADUALLY WORSENING
CLINICAL_PROGRESSION: NOT CHANGED
CLINICAL_PROGRESSION: GRADUALLY WORSENING
CLINICAL_PROGRESSION: NOT CHANGED
CLINICAL_PROGRESSION: GRADUALLY IMPROVING
CLINICAL_PROGRESSION: GRADUALLY WORSENING
CLINICAL_PROGRESSION: NOT CHANGED
CLINICAL_PROGRESSION: NOT CHANGED
CLINICAL_PROGRESSION: GRADUALLY WORSENING
CLINICAL_PROGRESSION: GRADUALLY WORSENING
CLINICAL_PROGRESSION: NOT CHANGED
CLINICAL_PROGRESSION: GRADUALLY WORSENING
CLINICAL_PROGRESSION: NOT CHANGED
CLINICAL_PROGRESSION: GRADUALLY WORSENING
CLINICAL_PROGRESSION: NOT CHANGED

## 2022-01-01 ASSESSMENT — PAIN SCALES - WONG BAKER

## 2022-01-01 ASSESSMENT — PAIN DESCRIPTION - ORIENTATION
ORIENTATION: MID
ORIENTATION: LOWER;UPPER
ORIENTATION: RIGHT
ORIENTATION: RIGHT;LEFT
ORIENTATION: RIGHT;LEFT
ORIENTATION: MID
ORIENTATION: MID
ORIENTATION: RIGHT;LEFT
ORIENTATION: MID
ORIENTATION: RIGHT;LEFT;POSTERIOR
ORIENTATION: LEFT
ORIENTATION: LOWER;RIGHT;LEFT
ORIENTATION: RIGHT;LEFT
ORIENTATION: MID
ORIENTATION: MID
ORIENTATION: RIGHT;LEFT
ORIENTATION: MID;RIGHT;LEFT
ORIENTATION: RIGHT;LEFT
ORIENTATION: RIGHT;LEFT
ORIENTATION: RIGHT;LEFT;LOWER
ORIENTATION: RIGHT;LEFT;POSTERIOR
ORIENTATION: MID
ORIENTATION: RIGHT;LEFT;MID
ORIENTATION: RIGHT;LEFT
ORIENTATION: LOWER
ORIENTATION: RIGHT;LEFT
ORIENTATION: RIGHT;LEFT;LOWER
ORIENTATION: RIGHT;LEFT
ORIENTATION: LOWER;RIGHT;LEFT
ORIENTATION: RIGHT;LEFT;LOWER
ORIENTATION: LOWER;RIGHT;LEFT
ORIENTATION: RIGHT;LEFT
ORIENTATION: LOWER;RIGHT;LEFT
ORIENTATION: LOWER
ORIENTATION: RIGHT;LEFT
ORIENTATION: RIGHT;LEFT;LOWER
ORIENTATION: LEFT;RIGHT
ORIENTATION: RIGHT;LEFT
ORIENTATION: MID
ORIENTATION: LEFT;RIGHT
ORIENTATION: RIGHT;LEFT;MID
ORIENTATION: LEFT;OUTER
ORIENTATION: RIGHT;LEFT
ORIENTATION: RIGHT;LEFT
ORIENTATION: MID
ORIENTATION: RIGHT;LEFT
ORIENTATION: LOWER
ORIENTATION: RIGHT
ORIENTATION: RIGHT;LEFT
ORIENTATION: RIGHT;LEFT
ORIENTATION: RIGHT;LEFT;LOWER
ORIENTATION: LOWER;RIGHT;LEFT
ORIENTATION: RIGHT;LEFT
ORIENTATION: LEFT
ORIENTATION: RIGHT;LEFT;LOWER
ORIENTATION: RIGHT;LEFT
ORIENTATION: RIGHT
ORIENTATION: MID
ORIENTATION: LOWER
ORIENTATION: LOWER;MID
ORIENTATION: RIGHT;LEFT;MID
ORIENTATION: RIGHT;LEFT
ORIENTATION: MID
ORIENTATION: LOWER
ORIENTATION: LEFT;RIGHT
ORIENTATION: LEFT
ORIENTATION: MID
ORIENTATION: RIGHT;LEFT
ORIENTATION: RIGHT;LEFT
ORIENTATION: RIGHT;LEFT;POSTERIOR
ORIENTATION: LOWER;RIGHT;LEFT
ORIENTATION: LEFT;RIGHT
ORIENTATION: RIGHT;LEFT;PROXIMAL
ORIENTATION: RIGHT;LEFT;POSTERIOR
ORIENTATION: RIGHT;LEFT;MID
ORIENTATION: RIGHT;LEFT
ORIENTATION: MID;LOWER;RIGHT;LEFT
ORIENTATION: UPPER;MID
ORIENTATION: RIGHT;LEFT;POSTERIOR
ORIENTATION: RIGHT;LEFT
ORIENTATION: LOWER;RIGHT;LEFT

## 2022-01-01 ASSESSMENT — PAIN DESCRIPTION - ONSET
ONSET: ON-GOING
ONSET: ON-GOING
ONSET: GRADUAL
ONSET: AWAKENED FROM SLEEP
ONSET: ON-GOING
ONSET: ON-GOING
ONSET: GRADUAL
ONSET: AWAKENED FROM SLEEP
ONSET: ON-GOING
ONSET: ON-GOING
ONSET: AWAKENED FROM SLEEP
ONSET: AWAKENED FROM SLEEP
ONSET: ON-GOING
ONSET: AWAKENED FROM SLEEP
ONSET: AWAKENED FROM SLEEP
ONSET: ON-GOING
ONSET: AWAKENED FROM SLEEP
ONSET: ON-GOING
ONSET: AWAKENED FROM SLEEP
ONSET: ON-GOING
ONSET: ON-GOING
ONSET: GRADUAL
ONSET: ON-GOING
ONSET: ON-GOING
ONSET: AWAKENED FROM SLEEP
ONSET: ON-GOING

## 2022-01-01 ASSESSMENT — PAIN DESCRIPTION - DESCRIPTORS
DESCRIPTORS: ACHING;DISCOMFORT
DESCRIPTORS: ACHING
DESCRIPTORS: BURNING
DESCRIPTORS: ACHING
DESCRIPTORS: ACHING;SORE
DESCRIPTORS: ACHING
DESCRIPTORS: ACHING
DESCRIPTORS: ACHING;BURNING;DISCOMFORT
DESCRIPTORS: ACHING
DESCRIPTORS: ACHING;THROBBING;DISCOMFORT
DESCRIPTORS: ACHING
DESCRIPTORS: ACHING
DESCRIPTORS: ACHING;DISCOMFORT
DESCRIPTORS: ACHING
DESCRIPTORS: ACHING
DESCRIPTORS: DISCOMFORT;ACHING
DESCRIPTORS: ACHING
DESCRIPTORS: ACHING;SORE
DESCRIPTORS: ACHING;BURNING
DESCRIPTORS: ACHING
DESCRIPTORS: ACHING;DISCOMFORT
DESCRIPTORS: ACHING
DESCRIPTORS: ACHING;SHARP
DESCRIPTORS: ACHING
DESCRIPTORS: ACHING
DESCRIPTORS: ACHING;BURNING
DESCRIPTORS: CRAMPING;DISCOMFORT
DESCRIPTORS: ACHING;NAGGING
DESCRIPTORS: ACHING
DESCRIPTORS: ACHING;DISCOMFORT
DESCRIPTORS: BURNING;ACHING
DESCRIPTORS: ACHING
DESCRIPTORS: ACHING
DESCRIPTORS: SHOOTING
DESCRIPTORS: SHOOTING;SHARP
DESCRIPTORS: ACHING
DESCRIPTORS: ACHING;DISCOMFORT
DESCRIPTORS: ACHING;DISCOMFORT
DESCRIPTORS: DISCOMFORT
DESCRIPTORS: ACHING;PRESSURE
DESCRIPTORS: ACHING
DESCRIPTORS: ACHING;DISCOMFORT
DESCRIPTORS: ACHING
DESCRIPTORS: ACHING;DISCOMFORT
DESCRIPTORS: ACHING
DESCRIPTORS: ACHING
DESCRIPTORS: ACHING;DISCOMFORT
DESCRIPTORS: BURNING;DISCOMFORT
DESCRIPTORS: ACHING;SORE
DESCRIPTORS: ACHING
DESCRIPTORS: ACHING;BURNING
DESCRIPTORS: ACHING;DISCOMFORT
DESCRIPTORS: ACHING;DISCOMFORT
DESCRIPTORS: ACHING;CONSTANT
DESCRIPTORS: ACHING;NAGGING
DESCRIPTORS: BURNING
DESCRIPTORS: ACHING;SHARP;DISCOMFORT
DESCRIPTORS: ACHING
DESCRIPTORS: CONSTANT;ACHING;DISCOMFORT
DESCRIPTORS: ACHING;SORE
DESCRIPTORS: ACHING
DESCRIPTORS: ACHING;DISCOMFORT
DESCRIPTORS: ACHING;SORE
DESCRIPTORS: ACHING
DESCRIPTORS: ACHING;DISCOMFORT
DESCRIPTORS: ACHING;DISCOMFORT
DESCRIPTORS: ACHING
DESCRIPTORS: ACHING;CONSTANT
DESCRIPTORS: ACHING;DISCOMFORT
DESCRIPTORS: ACHING;SORE
DESCRIPTORS: ACHING
DESCRIPTORS: ACHING
DESCRIPTORS: ACHING;CONSTANT
DESCRIPTORS: ACHING
DESCRIPTORS: ACHING;SORE
DESCRIPTORS: SHARP;STABBING
DESCRIPTORS: ACHING;DISCOMFORT
DESCRIPTORS: ACHING
DESCRIPTORS: ACHING;DISCOMFORT
DESCRIPTORS: SHARP
DESCRIPTORS: ACHING;DISCOMFORT
DESCRIPTORS: ACHING
DESCRIPTORS: ACHING;SORE
DESCRIPTORS: ACHING
DESCRIPTORS: ACHING;SORE
DESCRIPTORS: ACHING
DESCRIPTORS: ACHING;DISCOMFORT;CONSTANT
DESCRIPTORS: ACHING;SORE
DESCRIPTORS: CONSTANT;BURNING;ACHING
DESCRIPTORS: ACHING;SORE
DESCRIPTORS: ACHING

## 2022-01-01 ASSESSMENT — ENCOUNTER SYMPTOMS
ABDOMINAL PAIN: 0
SHORTNESS OF BREATH: 0
EYE DISCHARGE: 0
COLOR CHANGE: 0
NAUSEA: 1
COUGH: 1
VOMITING: 1
SHORTNESS OF BREATH: 1

## 2022-01-01 ASSESSMENT — PAIN DESCRIPTION - DIRECTION: RADIATING_TOWARDS: BACK

## 2022-01-17 NOTE — TELEPHONE ENCOUNTER
----- Message from Norman Miller MD sent at 1/17/2022 12:03 PM EST -----  Contact: Queta Cobos  76 units  ----- Message -----  From: Montse Hurt  Sent: 1/17/2022  11:01 AM EST  To: Norman Miller MD    Bernerstanley Peppers can only be prescribed with even number of units. Directions currently are 75 units nightly. Pharmacy needs it changed to 74 units nightly or 76 units nightly. Please advise.

## 2022-01-22 PROBLEM — N17.9 AKI (ACUTE KIDNEY INJURY) (HCC): Status: ACTIVE | Noted: 2022-01-01

## 2022-01-22 NOTE — EC ADMISSION CRITERIA
Admission Criteria    [facility] Guideline: Renal Failure Acute was utilized. Based on the indications selected for the patient, the bed status of Admit to Inpatient was determined to be MET    The following indications were selected as present at the time of evaluation of the patient:   - Admission is indicated for:    - Acute as indicated by:     - Serum creatinine greater than 4 mg/dL (354 micromoles/L) with acute rise greater than 0.5 mg/dL (44.2 micromoles/L)    Admission Criteria documentation entered by: [employer], 25th edition, Copyright © 2021 [address] [place] [address].  [id number]:02:16-05:00

## 2022-01-22 NOTE — PLAN OF CARE
Admit to med surgery for RODRICK, Start IVF. Nephrology consult.       Awa Anna MD 1/22/2022 6:11 PM

## 2022-01-22 NOTE — ED NOTES
Bed: 04  Expected date:   Expected time:   Means of arrival:   Comments:  -[facility]     Pilar Mora RN  01/22/22 [id number]
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
## 2022-01-23 PROBLEM — T23.232A: Status: ACTIVE | Noted: 2022-01-01

## 2022-01-23 PROBLEM — B95.61 MSSA BACTEREMIA: Status: ACTIVE | Noted: 2022-01-01

## 2022-01-23 PROBLEM — D72.829 LEUCOCYTOSIS: Status: ACTIVE | Noted: 2022-01-01

## 2022-01-23 PROBLEM — R78.81 MSSA BACTEREMIA: Status: ACTIVE | Noted: 2022-01-01

## 2022-01-23 PROBLEM — J96.01 ACUTE RESPIRATORY FAILURE WITH HYPOXIA (HCC): Status: ACTIVE | Noted: 2022-01-01

## 2022-01-23 NOTE — PROGRESS NOTES
Updated wife on pt status at 26, wife called pt mom. Wife asked if I would re-explain to pt mom when she arrived, I agreed and asked wife if she had any questions at this time. Wife denies questions at that time. At this time I met with wife and approx 6-8 other family members. I updated them on current status-pt on ventilator to breathe for him. Pt on iv medications to keep his blood pressure in a normal range. Pt on iv sedation to keep him asleep with machine breathing for him. Family aware that pt will be on CRRT to help his kidney function. Family tearful, only questions at this time are to see the doctor. Dr Cheryl Womack updated. Family told at this time that we will attempt to get them in to seem pt soon. Family taken per myself to ICU waiting room.   Carlos SHULTZN, RN

## 2022-01-23 NOTE — PROGRESS NOTES
FiO2 decreased to 70%           01/23/22 1604   Vent Information   Vt Ordered 430 mL   Rate Set 30 bmp   Peak Flow 75 L/min   Pressure Support 0 cmH20   FiO2  70 %   SpO2 99 %   SpO2/FiO2 ratio 141.43   Sensitivity 3   PEEP/CPAP 8   I Time/ I Time % 0 s   Vent Patient Data   High Peep/I Pressure 0   Peak Inspiratory Pressure 26 cmH2O   Mean Airway Pressure 14 cmH20   Rate Measured 30 br/min   Vt Exhaled 403 mL   Minute Volume 12.2 Liters   I:E Ratio 1:2.20   Plateau Pressure 19 [id number]   Static Compliance 31 mL/cmH2O   Cough/Sputum   Sputum How Obtained Endotracheal;Suctioned   $Obtained Sample $Induced Sputum   Spontaneous Breathing Trial (SBT) RT Doc   Pulse 140   Breath Sounds   Right Upper Lobe Diminished   Right Middle Lobe Diminished   Right Lower Lobe Diminished   Left Upper Lobe Diminished   Left Lower Lobe Diminished   Additional Respiratory  Assessments   Resp 30   Alarm Settings   High Pressure Alarm 40 cmH2O   Low Minute Volume Alarm 3 L/min   High Respiratory Rate 45 br/min   ETT (adult)   Placement Date: 01/23/22   Preoxygenation: Yes  Technique: Direct laryngoscopy  Type: Cuffed  Tube Size: 7.5 mm  Insertion attempts: 1  Secured at: 24 cm  Measured From: Lips   Secured at 24 cm   Measured From [address]er   Site Condition Dry

## 2022-01-23 NOTE — PROGRESS NOTES
01/23/22 1852   Vent Information   Vent Type 980   Vent Mode AC/VC   Vt Ordered 430 mL   Rate Set 30 bmp   Peak Flow 75 L/min   Pressure Support 0 cmH20   FiO2  60 %   SpO2 98 %   SpO2/FiO2 ratio 163.33   Sensitivity 3   PEEP/CPAP 8   I Time/ I Time % 0 s   Humidification Source Heated wire   Humidification Temp 37   Vent Patient Data   High Peep/I Pressure 0   Peak Inspiratory Pressure 25 cmH2O   Mean Airway Pressure 14 cmH20   Rate Measured 30 br/min   Vt Exhaled 424 mL   Minute Volume 12.4 Liters   I:E Ratio 1:2.20   Plateau Pressure 20 [id number]   Static Compliance 35 mL/cmH2O   Dynamic Compliance 21 mL/cmH2O   Cough/Sputum   Sputum How Obtained Endotracheal;Suctioned   Cough Non-productive   Sputum Amount None   Sputum Color None   Tenacity None   Spontaneous Breathing Trial (SBT) RT Doc   Pulse 129   Breath Sounds   Right Upper Lobe Diminished   Right Middle Lobe Diminished   Right Lower Lobe Diminished   Left Upper Lobe Diminished   Left Lower Lobe Diminished   Additional Respiratory  Assessments   Resp 30   Position Semi-Springer's   Alarm Settings   High Pressure Alarm 40 cmH2O   Low Minute Volume Alarm 3 L/min   High Respiratory Rate 45 br/min   Patient Observation   Observations ETT SIZE 7.5, SECURED AT 24 LIP LINE. AMBU BAG AT HEAD OF BED. WATER GOOD.     ETT (adult)   Placement Date: 01/23/22   Preoxygenation: Yes  Technique: Direct laryngoscopy  Type: Cuffed  Tube Size: 7.5 mm  Insertion attempts: 1  Secured at: 24 cm  Measured From: Lips   Secured at 24 cm   Measured From Lips   ET Placement Right   Secured By Commercial tube wheeler   Site Condition Dry

## 2022-01-23 NOTE — CONSULTS
Patient is being seen at the request of Jennifer Cobos MD   for a consultation for hypoxemic respiratory failure/ICU    HISTORY OF PRESENT ILLNESS:   42years old with history of diabetes, CHF not feeling well since Wednesday. Lab revealed RODRICK. Hypoxemic since admission requiring up to 9 L O2. Growing MSSA in his blood. Fever T-max 101.3. CODE BLUE was called for patient not breathing with no pulse. Patient was intubated, CPR and ACLS protocol were followed after which patient gained pulse and blood pressure back. In route to ICU he lost his pulse again for which CPR was restarted. Patient gained his pulse back a second time and started on epinephrine drip. Right IJ CVC as well as right femoral Vas-Cath was placed by me at the bedside.          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    Clostridium difficile infection 11/01/2016    Diabetic ulcer of right great toe associated with type 2 diabetes mellitus, with necrosis of bone ([address] 08/2019    Failed soft 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 toe amputation site 10/26/2020    History of hyperbaric oxygen therapy 10/28/2020    [facility]- [facility] - compromised flap    Migraine     Possible perforated tympanic membrane     as a result of recurrent otitis    Post-op hematoma of left foot 11/12/2020    Recurrent otitis media     Tear of medial meniscus of left knee     Tobacco use     Toe osteomyelitis, left ([address] 7/24/2020     PAST SURGICAL HISTORY:  Past Surgical History:   Procedure Laterality Date    KNEE ARTHROSCOPY Left [id number]    LEFT KNEE ARTHROSCOPY , SYNOVECTOMY       OTHER SURGICAL HISTORY Left 07/24/2020    PARTIAL LEFT FOOT AMPUTATION    TOE AMPUTATION Right 8/23/2019    PARTIAL RIGHT FOOT AMPUTATION performed by Gilberto Chavez DPM at [address] [facility] Left 7/24/2020    PARTIAL LEFT FOOT AMPUTATION performed by Tay Will DPM at [address] TOE AMPUTATION Left 10/22/2020    PARTIAL LEFT FOOT AMPUTATION WITH GRAFT APPLICATION performed by Tay Will DPM at [address] [place] [address] Drive      WISDOM TOOTH EXTRACTION         FAMILY HISTORY:  family history includes Diabetes in his father and mother; High Blood Pressure in his father, maternal grandmother, maternal uncle, mother, and sister; High Cholesterol in his father, maternal uncle, and mother; Stroke in his father. SOCIAL HISTORY:   reports that he quit smoking about 16 years ago. He has a 1.00 pack-year smoking history. He quit smokeless tobacco use about 16 years ago. Scheduled Meds:   insulin lispro  20 Units SubCUTAneous TID WC    cetirizine  5 mg Oral Daily    PARoxetine  10 mg Oral QAM    traZODone  50 mg Oral Nightly    sodium chloride flush  5-40 mL IntraVENous 2 times per day    heparin (porcine)  5,000 Units SubCUTAneous 3 times per day    insulin lispro  0-6 Units SubCUTAneous TID WC    insulin lispro  0-3 Units SubCUTAneous Nightly    insulin glargine  40 Units SubCUTAneous Nightly    ceFAZolin  1,000 mg IntraVENous Q12H     Continuous Infusions:   sodium chloride 25 mL (01/23/22 1206)    sodium chloride 125 mL/hr at 01/23/22 1055    dextrose       PRN Meds:  sodium chloride flush, sodium chloride, ondansetron **OR** ondansetron, acetaminophen **OR** acetaminophen, glucose, glucagon (rDNA), dextrose, dextrose bolus (hypoglycemia) **OR** dextrose bolus (hypoglycemia)    ALLERGIES:  Patient is allergic to [place] [sitagliptin], metformin and related, vancomycin, and mustard oil [allyl isothiocyanate]. REVIEW OF SYSTEMS: Unresponsive not able to obtain      PHYSICAL EXAM:  Blood pressure (!) 96/57, pulse 104, temperature 98.1 °F (36.7 °C), temperature source Oral, resp.  rate 26, height 6' 1\" (1.854 m), weight 285 lb (129.3 kg), SpO2 94 %.' on assist-control 30/460/10 100% FiO2  General: ill appearing. Intubated sedated. Eyes: PERRL. No sclera icterus. No conjunctival injection. ENT: No discharge. Pharynx clear. ET tube in place  Neck: Trachea midline. Normal thyroid. Resp: No accessory muscle use. No crackles. No wheezing. Few rhonchi. CV: Regular rate. irregular rhythm. No mumur or rub. 2+ LE edema. GI: Non-tender. Non-distended. No masses. Skin: Warm and dry. No nodule on exposed extremities. No rash on exposed extremities. Lymph: No cervical LAD. No supraclavicular LAD. M/S: No cyanosis. No joint deformity. No clubbing. Neuro: Intubated sedated. No response to painful stimuli. Psych: Noncommunicative unable to obtain      LABS:  CBC:   Recent Labs     01/22/22  1350 01/23/22  0455   WBC 18.6* 23.9*   HGB 11.3* 11.1*   HCT 32.2* 32.4*   MCV 90.1 90.7    170     BMP:   Recent Labs     01/22/22  1350 01/23/22  0455   * 129*   K 4.0 3.6   CL 87* 94*   CO2 19* 16*   PHOS  --  2.2*   BUN 68* 76*   CREATININE 5.6* 6.7*     LIVER PROFILE:   Recent Labs     01/22/22  1350   *   ALT 92*   BILITOT 1.3*   ALKPHOS 210*     PT/INR:   Recent Labs     01/23/22  1128   PROTIME 15.9*   INR 1.39*     APTT: No results for input(s): APTT in the last 72 hours. UA:  Recent Labs     01/22/22  1625   COLORU DARK YELLOW*   PHUR 5.0   WBCUA 0-2   RBCUA 0-2   BACTERIA Rare*   CLARITYU Clear   SPECGRAV >=1.030   LEUKOCYTESUR Negative   UROBILINOGEN 0.2   BILIRUBINUR SMALL*   BLOODU TRACE-LYSED*   GLUCOSEU 500*     No results for input(s): PHART, FQQ3MBV, PO2ART in the last 72 hours.     Chest x-ray 1/23 imaging was reviewed by me and showed   Satisfactory ET tube position as well as CVC position  Left-sided airspace disease  Cardiomegaly with pulmonary edema    ASSESSMENT:  · Acute hypoxemic respiratory failure  · Septic shock  · MSSA bacteremia  · Cardiopulmonary arrest x2 1/23/2022 required CPR  · Acute pulmonary edema/fluid overload  · Acute kidney injury  · Uremia  · DM with hyperglycemia  · Left hand burn  · History of cardiomyopathy-last echo 2014 EF 50%    PLAN:   Mechanical ventilation as per my orders. The ventilator was adjusted by me at the bedside for unstable, life threatening respiratory failure   Follow ABG and chest x-ray while on the ventilator   Supplemental oxygen to maintain SaO2 >92%; wean as tolerated   IV Propofol for sedation, target RASS -2, with daily spontaneous awakening trial    Follow TG    Fentanyl and Versed PRN, gtt as needed   Head of bed 30 degrees or higher at all times   Daily spontaneous breathing trial once PEEP less than 8, FiO2 less than 55%   Discussed with nephrology and plan for CRRT   IV Ancef -ID has been consulted   Follow cultures   IV Levophed/epinephrine/vasopressin to keep MAP > 65 mmHg or SBP>90   Mild to moderate hypothermia to a target temperature 32 to 34ºC for 24 hours.     Echocardiogram evaluate for vegetations   Repeat blood culture at 72 hours   Follow troponin   Nutrition: Tube feeding when able   Blood sugar control, with goal [id number] require insulin drip   GI prophylaxis: Pepcid   DVT prophylaxis: Subcu heparin   MRSA prophylaxis: Bactroban    Code status: Full code    Discussed with wife at the bedside multiple good questions were answered   Discussed with nephrology and internal medicine          Total critical care time caring for this patient with life threatening, unstable organ failure, including direct patient contact, management of life support systems, review of data including imaging and labs, discussions with other team members and physicians is 80 minutes, excluding procedures, but including CPR/CODE BLUE time

## 2022-01-23 NOTE — ED NOTES
Report provided 2 [facility].  Report provided to Jaja, [address] [place] [address]. Pt and spouse verbalize an understanding of admission and consent obtained.      Addy Linn, [address] [facility]  01/23/22 0002

## 2022-01-23 NOTE — PROCEDURES
ENDOTRACHEAL INTUBATION    INDICATION: Life threatening respiratory failure    TIME OUT: taken    SEDATION: None, patient was unresponsive procedure done during active code    PROCEDURE: Using direct laryngoscopy, the vocal cords were well visualized and an 7.5 mm endotracheal tube was place directly through the cords. Good breath sounds auscultated bilaterally without sounds over abdomen. Good return of CO2 on the monitor. CXR confirmed appropriate placement      Procedure: Nontunneled central venous access, right IJ    Indication: invasive hemodynamic monitoring, frequent blood draws, ensure stable IV access      Time Out: taken    Procedure: Sterile prep with chlorhexidine. Full maximum sterile field/barrier technique was followed (with cap and mask and sterile gown and large sterile sheet and hand hygeine and 2% chlorhexidine). Aqueous lidocaine anesthetic. Direct ultrasound visualization of the right internal jugular vein, with placement of central venous catheter using modified seldinger technique. Good dark venous blood from all 3 lumens. CXR pending confirmed appropriate placement in mid to distal SVC. No immediate complication. Recommendation: remove central line at earliest time feasible to mitigate infectious risks      Procedure: Right femoral hemodialysis catheter    Indication: invasive hemodynamic monitoring, frequent blood draws, ensure stable IV access    Time Out: taken    Procedure: Sterile prep with chlorhexidine. Full maximum sterile field/barrier technique was followed (with cap and mask and sterile gown and large sterile sheet and hand hygeine and 2% chlorhexidine). Aqueous lidocaine anesthetic. Direct ultrasound visualization of the right femoral vein, with placement of size 16 hemodialysis catheter using modified seldinger technique. Good dark venous blood from all 3 lumens.       Recommendation: remove v hemodialysis at earliest time feasible to mitigate infectious risks

## 2022-01-23 NOTE — H&P
History and Physical      Chief Complaint   Patient presents with    Altered Mental Status     Pt with brief LOC PTA      HISTORY OF PRESENT ILLNESS:     Patient is a 57-year-old white male with a prior history of diabetes mellitus type 2 poorly controlled, CHF, history of diabetic ulcer with amputation of the right great, history of tobacco abuse, recent burn to the left hand-Velban been feeling well since Wednesday. Initially thought he had COVID. Home COVID test is negative. Patient is sleeping more and had decreased appetite. Wife finally called 911. Work-up in the ED showed acute kidney injury. His creatinine was normal in October 2021. Patient is admitted to the hospital and started on IV fluids. This morning his creatinine is worse. He is made about 300 cc of urine. His mentation is worse. He is not requiring oxygen. The time of admission he did not require oxygen. He is presently on 5 L and saturating 90%. His respiratory rate is also got worse. His mentation is about the same. He can answer some questions. He denies any chest pain. The patient has been apparently taking ibuprofen for the last 2 days but does not take NSAIDs on a regular basis. He denies any chest pain. Patient is vaccinated against COVID-19 but has not had the booster. Patient is allergic to [place] and [place] [sitagliptin], metformin and related, vancomycin, and mustard oil [allyl isothiocyanate].     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    Clostridium difficile infection 11/01/2016    Diabetic ulcer of right great toe associated with type 2 diabetes mellitus, with necrosis of bone ([address] 08/2019    Failed soft 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 toe amputation site 10/26/2020    History of hyperbaric oxygen therapy 10/28/2020    [facility] - compromised flap    Migraine     Possible perforated tympanic membrane     as a result of recurrent otitis    Post-op hematoma of left foot 11/12/2020    Recurrent otitis media     Tear of medial meniscus of left knee     Tobacco use     Toe osteomyelitis, left ([address] 7/24/2020       Past Surgical History:   Procedure Laterality Date    KNEE ARTHROSCOPY Left [id number]    LEFT KNEE ARTHROSCOPY , SYNOVECTOMY       OTHER SURGICAL HISTORY Left 07/24/2020    PARTIAL LEFT FOOT AMPUTATION    TOE AMPUTATION Right 8/23/2019    PARTIAL RIGHT FOOT AMPUTATION performed by Trever Schmitz DPM at [address] TOE AMPUTATION Left 7/24/2020    PARTIAL LEFT FOOT AMPUTATION performed by Trever Schmitz DPM at [address] TOE AMPUTATION Left 10/22/2020    PARTIAL LEFT FOOT AMPUTATION WITH GRAFT APPLICATION performed by Trever Schmitz DPM at [address] [facility] EXTRACTION         Scheduled Meds:   carvedilol  12.5 mg Oral BID WC    insulin lispro  20 Units SubCUTAneous TID WC    cetirizine  5 mg Oral Daily    PARoxetine  10 mg Oral QAM    traZODone  50 mg Oral Nightly    sodium chloride flush  5-40 mL IntraVENous 2 times per day    heparin (porcine)  5,000 Units SubCUTAneous 3 times per day    insulin lispro  0-6 Units SubCUTAneous TID WC    insulin lispro  0-3 Units SubCUTAneous Nightly    insulin glargine  40 Units SubCUTAneous Nightly    ceFAZolin  1,000 mg IntraVENous Q12H       Continuous Infusions:   sodium chloride      sodium chloride 125 mL/hr at 01/23/22 1055    dextrose         PRN Meds:  sodium chloride flush, sodium chloride, ondansetron **OR** ondansetron, acetaminophen **OR** acetaminophen, glucose, glucagon (rDNA), dextrose, dextrose bolus (hypoglycemia) **OR** dextrose bolus (hypoglycemia)       reports that he quit smoking about 16 years ago. He has a 1.00 pack-year smoking history. He quit smokeless tobacco use about 16 years ago.     Family History   Problem Relation Age of Onset    Diabetes Mother     High Blood Pressure Mother     High Cholesterol Mother     Diabetes Father     High Blood Pressure Father     High Cholesterol Father     Stroke Father     High Blood Pressure Sister     High Blood Pressure Maternal Uncle     High Cholesterol Maternal Uncle     High Blood Pressure Maternal Grandmother        Social History     Socioeconomic History    Marital status:      Spouse name: None    Number of children: None    Years of education: None    Highest education level: None   Occupational History    None   Tobacco Use    Smoking status: Former Smoker     Packs/day: 0.50     Years: 2.00     Pack years: 1.00     Quit date: 2005     Years since quittin.4    Smokeless tobacco: Former User     Quit date: 2005    Tobacco comment: SMOKED OCCASIONALLY UNTIL 8 YEARS AGO   Vaping Use    Vaping Use: Never used   Substance and Sexual Activity    Alcohol use: Yes     Comment: RARELY    Drug use: No    Sexual activity: Yes     Partners: Female   Other Topics Concern    None   Social History Narrative    None     Social Determinants of Health     Financial Resource Strain:     Difficulty of Paying Living Expenses: Not on file   Food Insecurity:     Worried About Running Out of Food in the Last Year: Not on file    Jeffery of Food in the Last Year: Not on file   Transportation Needs:     Lack of Transportation (Medical): Not on file    Lack of Transportation (Non-Medical):  Not on file   Physical Activity:     Days of Exercise per Week: Not on file    Minutes of Exercise per Session: Not on file   Stress:     Feeling of Stress : Not on file   Social Connections:     Frequency of Communication with Friends and Family: Not on file    Frequency of Social Gatherings with Friends and Family: Not on file    Attends Judaism Services: Not on file    Active Member of Clubs or Organizations: Not on file    Attends Club or Organization Meetings: Not on file   Jovany Arrieta Marital Status: Not on file   Intimate Partner Violence:     Fear of Current or Ex-Partner: Not on file    Emotionally Abused: Not on file    Physically Abused: Not on file    Sexually Abused: Not on file   Housing Stability:     Unable to Pay for Housing in the Last Year: Not on file    Number of [place] in the Last Year: Not on file    Unstable Housing in the Last Year: Not on file     REVIEW OF SYSTEMS:     DEE due to some confusion    VS:   BP (!) 96/57   Pulse 104   Temp 98.1 °F (36.7 °C) (Oral)   Resp 26   Ht 6' 1\" (1.854 m)   Wt 285 lb (129.3 kg)   SpO2 94%   BMI 37.60 kg/m²     Gen: In distress mildly lethargic  Eyes: PERRL. No sclera icterus. No conjunctival injection. ENT: No discharge. Pharynx clear. Neck: Trachea midline. Normal thyroid. Resp:+ accessory muscle use. + crackles. No wheezes. No rhonchi. No dullness on percussion. CV: INCREASED rate. Regular rhythm. No murmur or rub. No edema. GI: Non-tender. Non-distended. No masses. No organomegaly. Normal bowel sounds. No hernia. Skin: burns left hand with some yellow slough. Chronic skin changes both legs. Lymph: No cervical LAD. No supraclavicular LAD. M/S: No cyanosis. No joint deformity. No clubbing. Missing right big toe  Neuro: Awake. Reflexes 2+ symmetric bilaterally. Moves all 4 extremities, non focal  Psych: Oriented x 2. CBC:   Recent Labs     01/22/22  1350 01/23/22  0455   WBC 18.6* 23.9*   HGB 11.3* 11.1*   HCT 32.2* 32.4*   MCV 90.1 90.7    170     BMP:   Recent Labs     01/22/22  1350 01/23/22  0455   * 129*   K 4.0 3.6   CL 87* 94*   CO2 19* 16*   PHOS  --  2.2*   BUN 68* 76*   CREATININE 5.6* 6.7*     LIVER PROFILE:   Recent Labs     01/22/22  1350   *   ALT 92*   BILITOT 1.3*   ALKPHOS 210*     PT/INR: No results for input(s): PROTIME, INR in the last 72 hours. APTT: No results for input(s): APTT in the last 72 hours.   UA:  Recent Labs     01/22/22  1625   COLORU DARK YELLOW* PHUR 5.0   WBCUA 0-2   RBCUA 0-2   BACTERIA Rare*   CLARITYU Clear   SPECGRAV >=1.030   LEUKOCYTESUR Negative   UROBILINOGEN 0.2   BILIRUBINUR SMALL*   BLOODU TRACE-LYSED*   GLUCOSEU 500*       XR CHEST PORTABLE   Final Result   Low lung volumes. No acute cardiopulmonary disease. XR CHEST 1 VIEW    (Results Pending)   IR NONTUNNELED VASCULAR CATHETER > 5 YEARS    (Results Pending)    Culture   Culture, Blood 2 -- Abnormal     Gram stain Aerobic bottle:   Gram positive cocci in clusters   resembling Staphylococcus   Information to follow    Abnormal     Organism Staph aureus DNA Detected Abnormal     Culture, Blood 2 --    mecA gene not detected   See additional report for complete    Results for Paralee Apt (MRN [id number]) as of 1/23/2022 11:00   Ref. Range 1/22/2022 15:50   SARS-CoV-2, NAAT Latest Ref Range: Not Detected  Not Detected         ASSESSMENT:    Principal Problem:    RODRICK (acute kidney injury) ([facility]  Active Problems:    Hypertension    Uncontrolled type 2 diabetes mellitus with diabetic polyneuropathy ([facility]    Cardiomyopathy ([facility]    Mixed hyperlipidemia    MSSA bacteremia    Acute respiratory failure with hypoxia (HCC)    Leucocytosis    Partial thickness burn of multiple fingers of left hand excluding thumb  Resolved Problems:    * No resolved hospital problems. *      PLAN:    #RODRICK. Patient admitted to hospital with RODRICK. Normal renal function October 2021. Patient is suspected to be prerenal.  He is worse. Creatinine is worsened. Nephrology consultation obtained. He was started on IV fluids I discussed with nephrology on the telephone. Emergent Vas-Cath and dialysis will be done. He may need CRRT or transfer patient to the ICU. Critical care consultation. #Acute respiratory failure. I think the patient is developing pulmonary edema causing acute respiratory failure from renal failure. He is on 5 L of oxygen. He is 90%. Titrate oxygen as needed.   Pulmonary consultation. #MSSA bacteremia. Etiology unclear but could be coming from the left hand. Started Ancef. ID consultation. Echocardiogram ordered. #Diabetes mellitus type 2 uncontrolled. Insulin. Continue sliding scale. Monitor sugars. #Hypertension. Blood pressure soft. Hold blood pressure medications. #Heparin for DVT prophylaxis. 33 minutes of CCT spent in evaluation, documentation, reviewing of data and discussing with consultants. Discussed with wife. Talked to clinical. Transfer to ICU.          Lisset Amador MD 1/23/2022 11:25 AM

## 2022-01-23 NOTE — PROGRESS NOTES
[address] resumed in elevator, LUCUS Machine  EPI push given in route. Patient continuously bagged since code started. Another given at 1336  1337 Bicarb in   1338 Epi given  1339 Pulse check, pulse present  1340 Dr. Marybeth Evans attempting Vas cath placement, Pulse still present. Dr. Maritza Lara Paged  1343 B/P 110/63     --HR 94  1346 EPI gtt started @ 10 mcg/hr -- HR 68  1347 1- Atropine given  1349 B/P  91/63 132HR Vas cath unsuccessful  [address] cath. Attempted in Femoral site and successfully placed at 1402  1353 76/52 B/P    102 HR     ~ Epi gtt increased to 20 mcg/hr  1356  B/P 97/63   HR--90  1357 Levophed Gtt obtained and started at 30mcg/min   B/P  76/35 99% O2 99 HR  1359 0.25 Epi given  104/63 B/P   1403 Dr. Maritza Lara present  1406 Levophed gtt changed to 20 mcg/min -- 141/73 B/P  1408 Ventilator setting 100% O2      ~ABG being obtained  1411 Dr. Marybeth Evans requesting Propofol as patient was stirring and fentanyl gtt.    1416 Propofol gtt started at 25 mcg/kg/min  1416 Fentanyl gtt started at 150 mcg/hr

## 2022-01-23 NOTE — FLOWSHEET NOTE
01/23/22 0921   Vitals   Temp 98.1 °F (36.7 °C)   Temp Source Oral   Pulse 104   Heart Rate Source Monitor   Resp 26   BP (!) 96/57   BP Location Right lower arm   pt AM assessment complete, pt diaphoretic, alert x 2 with slurred speech with hallucinations, pt reaching for thing that are not there wife at bedside . Also pt resp. are labored and shallow. On 5 L in the 90's %, spoke with DR Mari Borrego of pt labs and assessment. Vik Fritz yes

## 2022-01-23 NOTE — PROGRESS NOTES
Code blue called, 3256  GI performing CPR when team arrived to room. Patient quickly bagged and suctioned from aspiration   1319 1st Epi given  1319 CPR machine LUCUS started   1321 Pulse check negative, CPR resumed   1323 2nd Epi given as Dr. Mary Sibley present; attempting intubation  1323 Calcium given  1323 1 amp bicarb in  1324 Pulse check negative, CPR resumed   1325 2nd Amp of Bicarb given  1326 3rd epi given  1327 pulse check, pulse obtained \"good pulse\"  B/P Checked- unobtainable  1328 Dr. Mary Sibley requesting Ultrasound   Rhythm obtained to show possible A. Fib  EKG Performed  1329 Dr. Mray Sibley requesting Epi gtt  1330 145/71- B/P     -  HR 75   1332 Pt being moved to old ICU

## 2022-01-23 NOTE — PROGRESS NOTES
Pt wife called reported pt was having issue with breathing, writer called charge nurse to eval pt. Then writer heard Code blue called to his room.

## 2022-01-23 NOTE — FLOWSHEET NOTE
01/23/22 1239   Vitals   Temp 99.4 °F (37.4 °C)   Temp Source Axillary   Pulse 90   Heart Rate Source Monitor   Resp 28   BP (!) 101/56   BP Location Right lower arm   Oxygen Therapy   SpO2 93 %   O2 Device High flow nasal cannula   O2 Flow Rate (L/min) 9 L/min   pt continues with labored and shallow resp. Updated wife that pt will be going  up to ICU when bed available.

## 2022-01-23 NOTE — PROGRESS NOTES
Recliner Assessment:     Patient is not able to demonstrate the ability to move from a reclining position to an upright position within the recliner due to confusion. 4 Eyes Skin Assessment     The patient is being assess for   Admission    I agree that 2 RN's have performed a thorough Head to Toe Skin Assessment on the patient. ALL assessment sites listed below have been assessed. Areas assessed for pressure by both nurses:   [x]   Head, Face, and Ears   [x]   Shoulders, Back, and Chest, Abdomen  [x]   Arms, Elbows, and Hands   [x]   Coccyx, Sacrum, and Ischium  [x]   Legs, Feet, and Heels    Missing great toe and 2nd toe on bilateral feet. Large burn to left hand. Scattered bruises and abrasions. Skin Assessed Under all Medical Devices by both nurses:  O2 device tubing              All Mepilex Borders were peeled back and area peeked at by both nurses: N/A  Please list where Mepilex Borders are located: N/A           Co-signer eSignature: Electronically signed by Shaq Corona RN on 1/28/22 at 5:46 AM EST    Does the Patient have Skin Breakdown related to pressure?   No             Shimon Prevention initiated:  Yes   Wound Care Orders initiated:  No    [facility] nurse consulted for Pressure Injury (Stage 3,4, Unstageable, DTI, NWPT, Complex wounds)and New or Established Ostomies:  Yes      Primary Nurse eSignature: Electronically signed by Teagan Keene RN on 1/23/22 at 7:27 AM EST

## 2022-01-23 NOTE — PROGRESS NOTES
Report received from SURESH White from  and given to Rachele German ICU RN and patient transferred to ICU unit.

## 2022-01-23 NOTE — CONSULTS
[email or web address]. zlien  Nephrology Consult Note         Reason for Consult: RODRICK  Requesting Physician:  Dr. Leighann Car    Chief Complaint:    Chief Complaint   Patient presents with    Altered Mental Status     Pt with brief LOC PTA     History of Present Illness on 1/23/2022:    42 y.o. yo male with PMH of CHF, morbid obesity,, diabetic foot ulcer with amputation of the right great toe, history of diabetes, history of tobacco use who is admitted for RODRICK, sepsis  Patient was not acting right and sleeping more. 911 was called brought to the emergency room and has been admitted. Patient was noted to have RODRICK with creatinine of 5.7 which continued to get worse despite IV fluid. He has MSSA bacteremia  During the course of hospitalization on 1/23 patient started becoming hypoxic initially needed 5 L of nasal cannula which increased to 9 L.   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    Clostridium difficile infection 11/01/2016    Diabetic ulcer of right great toe associated with type 2 diabetes mellitus, with necrosis of bone ([address] 08/2019    Failed soft 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 toe amputation site 10/26/2020    History of hyperbaric oxygen therapy 10/28/2020    [facility]- [facility] - compromised flap    Migraine     Possible perforated tympanic membrane     as a result of recurrent otitis    Post-op hematoma of left foot 11/12/2020    Recurrent otitis media     Tear of medial meniscus of left knee     Tobacco use     Toe osteomyelitis, left ([address] 7/24/2020       Past Surgical History:        Procedure Laterality Date    KNEE ARTHROSCOPY Left [id number]    LEFT KNEE ARTHROSCOPY , SYNOVECTOMY       OTHER SURGICAL HISTORY Left 07/24/2020    PARTIAL LEFT FOOT AMPUTATION    TOE AMPUTATION Right 8/23/2019    PARTIAL RIGHT FOOT AMPUTATION performed by Jacob Mckeon DPM at [address] Left 7/24/2020    PARTIAL LEFT FOOT AMPUTATION performed by Jacob Mckeon DPM at [address] TOE AMPUTATION Left 10/22/2020    PARTIAL LEFT FOOT AMPUTATION WITH GRAFT APPLICATION performed by Jacob Mckeon DPM at [address] [facility] Medications:    No current facility-administered medications on file prior to encounter. Current Outpatient Medications on File Prior to Encounter   Medication Sig Dispense Refill    Insulin Degludec (TRESIBA FLEXTOUCH) 200 UNIT/ML SOPN INJECT 76 UNITS INTO THE SKIN NIGHTLY 9 mL 0    losartan (COZAAR) 50 MG tablet TAKE 1 TABLET BY MOUTH DAILY 90 tablet 1    tiZANidine (ZANAFLEX) 4 MG tablet TAKE 2 TABLETS BY MOUTH NIGHTLY 180 tablet 0    carvedilol (COREG) 12.5 MG tablet TAKE 1 TABLET BY MOUTH 2 TIMES DAILY (WITH MEALS) 180 tablet 0    rosuvastatin (CRESTOR) 20 MG tablet TAKE 1 TABLET BY MOUTH NIGHTLY 90 tablet 0    traZODone (DESYREL) 50 MG tablet TAKE 1 TABLET BY MOUTH NIGHTLY 90 tablet 0    furosemide (LASIX) 20 MG tablet TAKE 1 TABLET BY MOUTH DAILY 90 tablet 0    gabapentin (NEURONTIN) 400 MG capsule TAKE 2 CAPSULES BY MOUTH 3 TIMES DAILY FOR 90 DAYS. 540 capsule 0    insulin lispro (HUMALOG KWIKPEN) 200 UNIT/ML SOPN pen Inject 25 Units into the skin 3 times daily (before meals) 5 pen 2    glimepiride (AMARYL) 4 MG tablet TAKE 1 TABLET BY MOUTH EVERY MORNING (BEFORE BREAKFAST). 90 tablet 0    PARoxetine (PAXIL) 10 MG tablet TAKE 1 TABLET BY MOUTH EVERY MORNING 90 tablet 0    blood glucose test strips (ONETOUCH ULTRA) strip USE TO TEST BLOOD GLUCOSE DAILY. DX:E11.9 100 strip 0    Blood Glucose Monitoring Suppl (CONTOUR NEXT EZ) w/Device KIT Use to test glucose daily. DX:E11.9 1 kit 0    Insulin Pen Needle 32G X 6 MM MISC Use with insulin daily .  DX:E11.9 100 each 3    blood glucose monitor strips Use to test blood sugar daily. DX:E11.9 100 strip 3    oxymetazoline (AFRIN) 0.05 % nasal spray 2 sprays by Nasal route daily Indications: prior to HBO      loratadine (CLARITIN) 10 MG tablet Take 10 mg by mouth nightly as needed       sildenafil (VIAGRA) 100 MG tablet TAKE 1 TABLET BY MOUTH AS NEEDED FOR ERECTILE DYSFUNCTION 15 tablet 0       Allergies:  Januvia [sitagliptin], Metformin and related, Vancomycin, and Mustard oil [allyl isothiocyanate]    Social History:    Social History     Socioeconomic History    Marital status:      Spouse name: Not on file    Number of children: Not on file    Years of education: Not on file    Highest education level: Not on file   Occupational History    Not on file   Tobacco Use    Smoking status: Former Smoker     Packs/day: 0.50     Years: 2.00     Pack years: 1.00     Quit date: 2005     Years since quittin.4    Smokeless tobacco: Former User     Quit date: 2005    Tobacco comment: SMOKED OCCASIONALLY UNTIL 8 YEARS AGO   Vaping Use    Vaping Use: Never used   Substance and Sexual Activity    Alcohol use: Yes     Comment: RARELY    Drug use: No    Sexual activity: Yes     Partners: Female   Other Topics Concern    Not on file   Social History Narrative    Not on file     Social Determinants of Health     Financial Resource Strain:     Difficulty of Paying Living Expenses: Not on file   Food Insecurity:     Worried About Running Out of Food in the Last Year: Not on file    Jeffery of Food in the Last Year: Not on file   Transportation Needs:     Lack of Transportation (Medical): Not on file    Lack of Transportation (Non-Medical):  Not on file   Physical Activity:     Days of Exercise per Week: Not on file    Minutes of Exercise per Session: Not on file   Stress:     Feeling of Stress : Not on file   Social Connections:     Frequency of Communication with Friends and Family: Not on file    Frequency of Social Gatherings with Friends and Family: Not on file    Attends Buddhist Services: Not on file    Active Member of Clubs or Organizations: Not on file    Attends Club or Organization Meetings: Not on file    Marital Status: Not on file   Intimate Partner Violence:     Fear of Current or Ex-Partner: Not on file    Emotionally Abused: Not on file    Physically Abused: Not on file    Sexually Abused: Not on file   Housing Stability:     Unable to Pay for Housing in the Last Year: Not on file    Number of [place] in the Last Year: Not on file    Unstable Housing in the Last Year: Not on file       Family History:   Family History   Problem Relation Age of Onset    Diabetes Mother     High Blood Pressure Mother     High Cholesterol Mother     Diabetes Father     High Blood Pressure Father     High Cholesterol Father     Stroke Father     High Blood Pressure Sister     High Blood Pressure Maternal Uncle     High Cholesterol Maternal Uncle     High Blood Pressure Maternal Grandmother        Review of Systems:   Unable to obtain    Physical exam:   Constitutional:  VITALS:  BP (!) 101/56   Pulse 90   Temp 99.4 °F (37.4 °C) (Axillary)   Resp 28   Ht 6' 1\" (1.854 m)   Wt 285 lb (129.3 kg)   SpO2 91%   BMI 37.60 kg/m²   Gen:  Intubated  Cardiovascular:  S1, S2 without m/r/g trace lower extremity edema  Respiratory: Decreased breath sounds  Abdomen:  soft, nt, nd,   Neuro/Psy: Intubated, sedated  Right femoral Vas-Cath    Data/  Recent Labs     01/22/22  1350 01/23/22  0455   WBC 18.6* 23.9*   HGB 11.3* 11.1*   HCT 32.2* 32.4*   MCV 90.1 90.7    170     Recent Labs     01/22/22  1350 01/23/22  0455   * 129*   K 4.0 3.6   CL 87* 94*   CO2 19* 16*   GLUCOSE 412* 243*   PHOS  --  2.2*   BUN 68* 76*   CREATININE 5.6* 6.7*   LABGLOM 11* 9*   GFRAA 13* 11*     Urinalysis shows 100 protein otherwise bland    Assessment  -RODRICK likely related to prerenal factors in the setting of sepsis, use of diuretics and losartan. UA is not suggestive of glomerulonephritis. Patient did not respond to IV fluid and developed acute pulmonary edema and renal replacement therapy initiated on 1/23/22    -Acute pulmonary edema   Status post intubation    -Acute hypoxic respiratory failure    -cardio respiratory arrest     -Sepsis with MSSA bacteremia    -Morbid obesity    -Diabetes, uncontrolled    Plan  -CRRT as ordered to keep 100 mL/h positive balance  -Maintain hemodynamics: Keep MAP>65   - IV fluid until the patient is on CRRT  -Echocardiogram  -Urine studies  -Antibiotics per ID/critical care  -Serial renal panel  -Daily wts and strict i/o  -Renal dose meds and avoid nephrotoxins    33 min of critical care time used reviewing the chart, and managing/coordinating the care of this patient. [phone] seen on CRRT. Pt tolerating well. Will add heparin pre filter for AC. Thank you for the consultation. Please do not hesitate to call with questions. Eboni Malhotra MD  Office: [phone]  Fax:    [phone]  [facility]

## 2022-01-24 PROBLEM — T81.31XA SURGICAL WOUND DEHISCENCE, INITIAL ENCOUNTER: Status: RESOLVED | Noted: 2020-10-26 | Resolved: 2022-01-01

## 2022-01-24 PROBLEM — J96.01 ACUTE RESPIRATORY FAILURE WITH HYPOXIA (HCC): Status: RESOLVED | Noted: 2022-01-01 | Resolved: 2022-01-01

## 2022-01-24 PROBLEM — S62.636A CLOSED DISPLACED FRACTURE OF DISTAL PHALANX OF RIGHT LITTLE FINGER: Status: RESOLVED | Noted: 2021-04-08 | Resolved: 2022-01-01

## 2022-01-24 PROBLEM — M86.672 CHRONIC OSTEOMYELITIS OF LEFT FOOT (HCC): Status: RESOLVED | Noted: 2020-10-27 | Resolved: 2022-01-01

## 2022-01-24 PROBLEM — G62.9 NEUROPATHY: Status: ACTIVE | Noted: 2021-04-08

## 2022-01-24 PROBLEM — L02.612 ABSCESS OF LEFT FOOT: Status: ACTIVE | Noted: 2022-01-01

## 2022-01-24 PROBLEM — T23.302A: Status: ACTIVE | Noted: 2022-01-01

## 2022-01-24 PROBLEM — S62.636A CLOSED DISPLACED FRACTURE OF DISTAL PHALANX OF RIGHT LITTLE FINGER: Status: ACTIVE | Noted: 2021-04-08

## 2022-01-24 PROBLEM — L97.524 DIABETIC ULCER OF TOE OF LEFT FOOT ASSOCIATED WITH TYPE 2 DIABETES MELLITUS, WITH NECROSIS OF BONE (HCC): Status: RESOLVED | Noted: 2019-08-01 | Resolved: 2022-01-01

## 2022-01-24 PROBLEM — E11.621 DIABETIC ULCER OF TOE OF LEFT FOOT ASSOCIATED WITH TYPE 2 DIABETES MELLITUS, WITH NECROSIS OF BONE (HCC): Status: RESOLVED | Noted: 2019-08-01 | Resolved: 2022-01-01

## 2022-01-24 PROBLEM — D72.829 LEUCOCYTOSIS: Status: RESOLVED | Noted: 2022-01-01 | Resolved: 2022-01-01

## 2022-01-24 NOTE — PROGRESS NOTES
Dr. Len Jasmine called for patient update. States if patient is not able to wake up and follow commands, then initiate TTM therapy. Sedation meds paused to assess status. Patient will withdraw slightly from pain, PERRL, and has a gag reflex, but patient does not open his eyes, or respond to any verbal commands given. [employer] pads applied, and hypothermia protocol started at this time. Will monitor.

## 2022-01-24 NOTE — CONSULTS
Pt seen by Dr Belinda Pantoja. Dressings applied. Dr Maribel Mesa consulted. Wound care rn will not see. Call for questions or concerns.

## 2022-01-24 NOTE — PROGRESS NOTES
AM assessment completed. See flowsheet. Sedated on vent. Propofol infusing at 25 mcg/kg/min, and Fentanyl infusing at 125 mcg/hr. RASS -5. Pt is on CRRT and TTM protocol. Core temp is 91.1 at this time. CRRT is running without issue. Attempting to maintain even at this time. Lungs sounds diminished throughout. Afib on bedside monitor. Levophed infusing at 6 mcg/min. MAP 81. Bowel sounds hypoactive. +1 BLE edema noted. Urinary catheter in place with very little cloudy mateo urine. Bilateral soft wrist restraints in place for safety. Labs reviewed. Cont to monitor.

## 2022-01-24 NOTE — PROGRESS NOTES
01/24/22 0352   Vent Information   Vent Type 980   Vent Mode AC/VC   Vt Ordered 430 mL   Rate Set 30 bmp   Peak Flow 75 L/min   Pressure Support 0 cmH20   FiO2  50 %   SpO2 100 %   SpO2/FiO2 ratio 200   Sensitivity 3   PEEP/CPAP 8   I Time/ I Time % 0 s   Humidification Source Heated wire   Humidification Temp 37   Humidification Temp Measured 36.7   Circuit Condensation Drained   Vent Patient Data   High Peep/I Pressure 0   Peak Inspiratory Pressure 28 cmH2O   Mean Airway Pressure 15 cmH20   Rate Measured 30 br/min   Vt Exhaled 445 mL   Minute Volume 13.4 Liters   I:E Ratio 1:2.20   Plateau Pressure 21 JES70   Cough/Sputum   Sputum How Obtained Endotracheal;Suctioned   Cough Non-productive   Sputum Amount None   Sputum Color None   Tenacity None   Spontaneous Breathing Trial (SBT) RT Doc   Pulse 65   Breath Sounds   Right Upper Lobe Diminished   Right Middle Lobe Diminished   Right Lower Lobe Diminished   Left Upper Lobe Diminished   Left Lower Lobe Diminished   Additional Respiratory  Assessments   Resp 30   Position Semi-Springer's   Alarm Settings   High Pressure Alarm 40 cmH2O   Low Minute Volume Alarm 3 L/min   High Respiratory Rate 45 br/min   Patient Observation   Observations ETT SIZE 7.5, SECURED AT 24 LIP LINE. AMBU BAG AT HEAD OF BED. WATER GOOD.     ETT (adult)   Placement Date: 01/23/22   Preoxygenation: Yes  Technique: Direct laryngoscopy  Type: Cuffed  Tube Size: 7.5 mm  Insertion attempts: 1  Secured at: 24 cm  Measured From: Lips   Secured at 24 cm   Measured From Lips   ET Placement Left   Secured By Commercial tube wheeler   Site Condition Dry

## 2022-01-24 NOTE — CONSULTS
Pharmacy to dose ancef for MSSA bacteremia from Dr. Wicho Samuels  Pt now on CRRT for renal failure.    Per lexicomp will dose cefazolin 2gram q12h

## 2022-01-24 NOTE — PROGRESS NOTES
Target temperature of 91.4 F has been reached. Patient has tolerated cooling process without incident thus far. Propofol increased to 30mcg/kg/min, and Fentanyl to 125mcg/hr due to shivering. Will monitor.

## 2022-01-24 NOTE — PROGRESS NOTES
01/23/22 1858   Vent Information   Vt Ordered 430 mL   Rate Set 30 bmp   Peak Flow 75 L/min   Pressure Support 0 cmH20   FiO2  50 %   SpO2 96 %   SpO2/FiO2 ratio 192   Sensitivity 3   PEEP/CPAP 8   I Time/ I Time % 0 s   Vent Patient Data   High Peep/I Pressure 0   Peak Inspiratory Pressure 26 cmH2O   Mean Airway Pressure 14 cmH20   Rate Measured 30 br/min   Vt Exhaled 370 mL   Minute Volume 14.2 Liters   I:E Ratio 1:2.20   Spontaneous Breathing Trial (SBT) RT Doc   Pulse 123   Additional Respiratory  Assessments   Resp 24   Alarm Settings   High Pressure Alarm 40 cmH2O   Low Minute Volume Alarm 3 L/min   High Respiratory Rate 45 br/min

## 2022-01-24 NOTE — PROGRESS NOTES
Patient care assumed, assessment completed as charted. Patient continues on ventilator, #7.5 at the 25 lip line. A/C 30, , FiO2 50%, PEEP 8. Right IJ CVC in place with Fentanyl infusing at 100mcg/hr, Propofol at 20mcg/kg/min, and Levophed at 30mcg/min. CRRT running without incident via right femoral VasCath with keep even goal in progress. OG in place and clamped, rea catheter patent, bilateral soft wrist restraints in place for continued patient safety. No further needs assessed at this time. Will monitor.

## 2022-01-24 NOTE — PROGRESS NOTES
Hospitalist Progress Note      PCP: Gabriela Cuelalr MD    Date of Admission: 1/22/2022    Subjective: getting echo, cont to be intubated, on levophed, getting crrt    Medications:  Reviewed    Infusion Medications    heparin 25,000 units in dextrose 5% 250 mL infusion 1,000 Units/hr (01/24/22 1446)    sodium chloride Stopped (01/23/22 1319)    dextrose      prismaSATE BGK 4/2.5 2,000 mL/hr at 01/24/22 1746    prismaSATE BGK 4/2.5 1,000 mL/hr at 01/24/22 1746    prismaSATE BGK 4/2.5 1,000 mL/hr at 01/24/22 1746    heparin 5000 units CRRT syringe 1.6 mL/hr at 01/24/22 0840    propofol 20 mcg/kg/min (01/24/22 1141)    fentaNYL 100 mcg/hr (01/24/22 1400)    norepinephrine 6 mcg/min (01/24/22 1400)    insulin 7.38 Units/hr (01/24/22 1700)     Scheduled Medications    famotidine (PEPCID) injection  20 mg IntraVENous Daily    calcium gluconate  1,000 mg IntraVENous Once    sodium chloride flush  5-40 mL IntraVENous 2 times per day    ceFAZolin 2000 mg in 20 mL SWFI IV Syringe  2,000 mg IntraVENous Q12H    hydrocortisone sodium succinate PF  50 mg IntraVENous Q6H    chlorhexidine  15 mL Mouth/Throat BID     PRN Meds: heparin (porcine), heparin (porcine), sodium chloride flush, sodium chloride, acetaminophen **OR** acetaminophen, glucose, glucagon (rDNA), dextrose, dextrose bolus (hypoglycemia) **OR** dextrose bolus (hypoglycemia), magnesium sulfate, calcium gluconate **OR** calcium gluconate **OR** calcium gluconate **OR** calcium gluconate, sodium phosphate IVPB **OR** sodium phosphate IVPB **OR** sodium phosphate IVPB **OR** sodium phosphate IVPB      Intake/Output Summary (Last 24 hours) at 1/24/2022 1759  Last data filed at 1/24/2022 1400  Gross per 24 hour   Intake 2017.89 ml   Output 2071 ml   Net -53.11 ml       Physical Exam Performed:    BP (!) 90/55   Pulse 73   Temp 92 °F (33.3 °C) (Core)   Resp 24   Ht 6' 1\" (1.854 m)   Wt 293 lb 4.8 oz (133 kg)   SpO2 96%   BMI 38.70 kg/m² Gen: intubated  Eyes: PERRL. No sclera icterus. No conjunctival injection. ENT: No discharge. Pharynx clear. Neck: Trachea midline. Normal thyroid. Resp:+ accessory muscle use. + crackles. No wheezes. No rhonchi. CV: INCREASED rate. Regular rhythm. No murmur or rub. No edema. GI: Non-tender. Non-distended. No masses. No organomegaly. Normal bowel sounds. No hernia. Skin: burns left hand with some yellow slough. Chronic skin changes both legs. Lymph: No cervical LAD. No supraclavicular LAD. M/S: No cyanosis. No joint deformity. No clubbing. Missing right big toe  Neuro: intubated, cannot be assessed  Psych: intubated, cannot be assessed    Labs:   Recent Labs     01/24/22  0200 01/24/22  0815 01/24/22  1430   WBC 19.2* 14.8* 14.8*   HGB 12.3* 11.4* 11.5*   HCT 36.5* 33.8* 34.1*    127* 123*     Recent Labs     01/24/22  0200 01/24/22  0815 01/24/22  1430   * 131* 133*   K 3.7 3.7 3.7   CL 97* 96* 97*   CO2 23 22 23   BUN 37* 41* 34*   CREATININE 3.0* 3.6* 3.0*   CALCIUM 8.5 8.4 8.0*   PHOS 1.7* 2.8 2.8     Recent Labs     01/23/22  1410 01/23/22 2000 01/24/22  0815   * 401* 235*   * 187* 149*   BILIDIR 1.1* 0.9* 1.3*   BILITOT 1.6* 1.5* 1.8*   ALKPHOS 330* 351* 320*     Recent Labs     01/23/22  1128 01/23/22 2000 01/24/22  0815   INR 1.39* 1.42* 1.61*     Recent Labs     01/24/22  0200 01/24/22  0815 01/24/22  1430   CKTOTAL 4,512*  --   --    TROPONINI <0.01 <0.01 <0.01       Urinalysis:      Lab Results   Component Value Date    NITRU Negative 01/22/2022    WBCUA 0-2 01/22/2022    BACTERIA Rare 01/22/2022    RBCUA 0-2 01/22/2022    BLOODU TRACE-LYSED 01/22/2022    SPECGRAV >=1.030 01/22/2022    GLUCOSEU 500 01/22/2022       Radiology:  XR FOOT LEFT (2 VIEWS)   Final Result   1. Remote history of amputation at the 1st and 2nd digits. No evidence of   osteomyelitis at prior resection site. 2. Subtle erosions at the 3rd MTP joint are new.   This is adjacent to soft tissue swelling at the prior amputation site. A new focus of osteomyelitis   is suspected. 3. Soft tissue swelling and questionable subcutaneous gas along the lateral   aspect of the left foot. There is also severe disorganization of bone at the   2nd through 5th tarsometatarsal joints. Although pattern may represent   Charcot arthropathy due to the more diffuse appearance, areas of   osteomyelitis cannot be excluded with plain film. Correlate with physical   exam and clinical workup. A follow-up MRI may be helpful for further   evaluation. XR CHEST PORTABLE   Final Result   Low lung volumes with cardiomegaly and vascular congestion, as well as patchy   airspace disease bilaterally, similar to the previous exam.         XR CHEST PORTABLE   Preliminary Result   Status post advancement of right internal jugular central line with distal   tip now visualized near region of junction of superior vena cava and right   atrium. No evidence of pneumothorax. XR CHEST PORTABLE   Final Result   Improved lung volumes. Bilateral perihilar opacification, edema versus   infiltrate. Satisfactory position of endotracheal tube. Central line tip in either the   distal brachiocephalic vein or proximal SVC. XR CHEST PORTABLE   Final Result   Cardiomegaly with left mid and lower lung infiltrates. Support tubes as described above. XR CHEST 1 VIEW   Final Result   Limited chest as outlined above. Bilateral perihilar opacification, edema   versus infiltrate. XR CHEST PORTABLE   Final Result   Low lung volumes. No acute cardiopulmonary disease.                  Assessment/Plan:    Active Hospital Problems    Diagnosis     Abscess of left foot [L02.612]     MSSA bacteremia [R78.81, B95.61]     Third degree burn of left hand [T23.302A]     Syncope [R55]     Acute hypoxemic respiratory failure (HCC) [J96.01]     Septic shock (HCC) [A41.9, R65.21]     Cardiopulmonary arrest ([facility] [I46.9]     Acute renal failure ([facility] [N17.9]     Hyperglycemia [R73.9]     Mixed hyperlipidemia [E78.2]     Cardiomyopathy ([facility] [I42.9]     Uncontrolled type 2 diabetes mellitus with diabetic polyneuropathy ([facility] [E11.42, E11.65]          #RODRICK. Patient admitted to hospital with RODRICK. Normal renal function October 2021. Patient is suspected to be prerenal/ATN. Creatinine worsened. Nephrology consulted. He was started on IV fluids  Emergent Vas-Cath placed and CRRT started. Critical care consultation.     #Acute respiratory failure. Suspect patient developed acute pulmonary edema causing acute respiratory failure from renal failure. Intubated 1/23/22. Pulmonary consulted    #H/o cardiomyopathy - check echo, cardio consulted    #S/p PEA arrest 1/23/22    # Left foot abscess - s/p I&D, ID and podiatry consulted     #MSSA bacteremia. Etiology unclear but suspect from LE. Started Ancef. ID consultation. Echocardiogram ordered. Might need JOHN      #Diabetes mellitus type 2 uncontrolled. Insulin. Continue sliding scale. Monitor sugars.     #Hypertension. Blood pressure soft. Hold blood pressure medications.           Heparin for DVT prophylaxis.   Diet: Diet NPO  Code Status: Full Code    PT/OT Eval Status: not indicated    [facility]    Edison Pena MD

## 2022-01-24 NOTE — PROGRESS NOTES
developed acute pulmonary edema and renal replacement therapy initiated on 1/23/22     -Acute pulmonary edema              Status post intubation     -Acute hypoxic respiratory failure     -cardio respiratory arrest      -Sepsis with MSSA bacteremia     -Morbid obesity     -Diabetes, uncontrolled    Plan/     Continue CRRT  Running even  Follow labs     -----------------------------  Darya Irwin M.D.   [facility]

## 2022-01-24 NOTE — CONSULTS
CARDIOLOGY CONSULTATION        Patient Name: Vanessa Beasley  Date of admission: 1/22/2022  1:32 PM  Admission Dx: Hyperglycemia [R73.9]  RODRICK (acute kidney injury) ([facility] [address] [N17.9]  Acute renal failure, unspecified acute renal failure type ([facility] [address] [N17.9]  Syncope, unspecified syncope type [R55]  Requesting Physician: Cate Rizzo MD  Primary Care physician: Jose D Gomez MD    Reason for Consultation/Chief Complaint:  Cardiopulmonary arrest, CM, AF    History of Present Illness:     Vanessa Beasley is a 42 y.o. patient with a prior medical history notable for poorly controlled diabetes mellitus type 2 with prior partial amputations bilat feet, CHF, tobacco abuse, who presented to the hospital with complaints of loss of consciousness, worsening mentation, dyspnea. ED course/Vital signs on presentation: New oxygen requirement with sats 90% on 5L. Scr 5.6 (basleine 0.8), K 4.0, bicarb 19, BUN 68. Lactate 3.8. Glc 412. Trop <0.01 to 0.01 with flat trend. Elevated AST/ALT. CK >4500. CXR showed cardiomegaly with left mid and lower lung infiltrates. Rapid COVID 19 neg. Flu neg. Patient suffered recent left hand burn. Taking NSAIDs x 2 days for this. Found to be in RODRICK, resp failure on presentation, MSSA bacteremia noted. Condition deteriorated with code blue called 1/23 as patient found without pulse/not breathing. ACLS with ROSC. Coded 2nd time in route to ICU. Intubation/mechanical ventilation. Initiated on CRRT. Patient is presently intubated/sedated. Junior Garcia his wife is at bedside. History gathered exclusively from chart review and discussion with her. Patient was feeling ill for 5 days prior to presentation. Decreased p.o. intake. Increasing shortness of breath. No increasing lower extremity edema, paroxysmal nocturnal dyspnea or abdominal bloating complaints per his wife. No chest pain/pressure with exertion but did complain with mild chest discomfort on rolling over in bed.   Became more confused and more short of breath necessitating admission. No syncope. No palpitations. No history of atrial fibrillation. No history of sleep apnea but never tested. Does snore. Last seen by Dr. Cantu Cons 2015. Carvedilol, losartan, lasix included among home medications. Wife states he was taking them faithfully up until a couple days prior to admission as he was feeling dehydrated. Past Medical History:   has a past medical history of Acute osteomyelitis of right hallux ([address] Cellulitis of left foot, CHF (congestive heart failure) ([address] Chronic osteomyelitis of left foot ([address] Closed displaced fracture of distal phalanx of right little finger, Clostridium difficile infection, Diabetic ulcer of left forefoot associated with type 2 diabetes mellitus, with necrosis of bone ([address] Diabetic ulcer of right great toe associated with type 2 diabetes mellitus, with necrosis of bone ([address] Failed soft 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 toe amputation site, History of hyperbaric oxygen therapy, Migraine, Possible perforated tympanic membrane, Post-op hematoma of left foot, Recurrent otitis media, Tear of medial meniscus of left knee, Tobacco use, and Toe osteomyelitis, left ([address] Surgical History:   has a past surgical history that includes Tonsillectomy; [place] tooth extraction; Knee arthroscopy (Left, [id number]); Toe amputation (Right, 8/23/2019); other surgical history (Left, 07/24/2020); Toe amputation (Left, 7/24/2020); and Toe amputation (Left, 10/22/2020). Social History:   reports that he quit smoking about 16 years ago. He has a 1.00 pack-year smoking history. He quit smokeless tobacco use about 16 years ago. He reports current alcohol use. He reports that he does not use drugs.      Family History:  family history includes Diabetes in his father and mother; High Blood Pressure in his father, maternal grandmother, maternal uncle, mother, and sister; High Cholesterol in his father, maternal uncle, and mother; Stroke in his father. Home Medications:  Were reviewed and are listed in nursing record and/or below  Prior to Admission medications    Medication Sig Start Date End Date Taking? Authorizing Provider   Insulin Degludec (TRESIBA FLEXTOUCH) 200 UNIT/ML SOPN INJECT 76 UNITS INTO THE SKIN NIGHTLY 1/17/22  Yes Cam Carroll, MD   losartan (COZAAR) 50 MG tablet TAKE 1 TABLET BY MOUTH DAILY 1/13/22  Yes Cam Carroll, MD   tiZANidine (ZANAFLEX) 4 MG tablet TAKE 2 TABLETS BY MOUTH NIGHTLY 1/4/22  Yes Cam Carroll, MD   carvedilol (COREG) 12.5 MG tablet TAKE 1 TABLET BY MOUTH 2 TIMES DAILY (WITH MEALS) 12/30/21  Yes Cam Carroll, MD   rosuvastatin (CRESTOR) 20 MG tablet TAKE 1 TABLET BY MOUTH NIGHTLY 12/30/21  Yes Dandre Crawford, MD   traZODone (DESYREL) 50 MG tablet TAKE 1 TABLET BY MOUTH NIGHTLY 12/7/21  Yes Cam Carroll, MD   furosemide (LASIX) 20 MG tablet TAKE 1 TABLET BY MOUTH DAILY 12/7/21  Yes Cam Carroll, MD   gabapentin (NEURONTIN) 400 MG capsule TAKE 2 CAPSULES BY MOUTH 3 TIMES DAILY FOR 90 DAYS. 11/30/21 2/28/22 Yes Bo Camarena MD   insulin lispro (HUMALOG KWIKPEN) 200 UNIT/ML SOPN pen Inject 25 Units into the skin 3 times daily (before meals) 10/14/21  Yes Cam Carroll, MD   glimepiride (AMARYL) 4 MG tablet TAKE 1 TABLET BY MOUTH EVERY MORNING (BEFORE BREAKFAST). 9/10/21  Yes Bo Camarena MD   PARoxetine (PAXIL) 10 MG tablet TAKE 1 TABLET BY MOUTH EVERY MORNING 9/10/21  Yes Bo Camarena MD   blood glucose test strips (ONETOUCH ULTRA) strip USE TO TEST BLOOD GLUCOSE DAILY. DX:E11.9 3/5/21  Yes Bo Camarena MD   Blood Glucose Monitoring Suppl (CONTOUR NEXT EZ) w/Device KIT Use to test glucose daily. DX:E11.9 12/10/20  Yes Bo Camarena MD   Insulin Pen Needle 32G X 6 MM MISC Use with insulin daily . DX:E11.9 12/4/20  Yes Bo Camarena MD   blood glucose monitor strips Use to test blood sugar daily. DX: E11.9 12/4/20  Yes Jose D Gomez MD   oxymetazoline (AFRIN) 0.05 % nasal spray 2 sprays by Nasal route daily Indications: prior to HBO   Yes Historical Provider, MD   loratadine (CLARITIN) 10 MG tablet Take 10 mg by mouth nightly as needed    Yes Historical Provider, MD   sildenafil (VIAGRA) 100 MG tablet TAKE 1 TABLET BY MOUTH AS NEEDED FOR ERECTILE DYSFUNCTION 8/27/19  Yes Jose D Gomez MD        CURRENT Medications:  sodium chloride flush 0.9 % injection 5-40 mL, 2 times per day  sodium chloride flush 0.9 % injection 5-40 mL, PRN  0.9 % sodium chloride infusion, PRN  acetaminophen (TYLENOL) tablet 650 mg, Q6H PRN   Or  acetaminophen (TYLENOL) suppository 650 mg, Q6H PRN  heparin (porcine) injection 5,000 Units, 3 times per day  glucose (GLUTOSE) 40 % oral gel 15 g, PRN  glucagon (rDNA) injection 1 mg, PRN  dextrose 5 % solution, PRN  dextrose bolus (hypoglycemia) 10% 125 mL, PRN   Or  dextrose bolus (hypoglycemia) 10% 250 mL, PRN  prismaSATE BGK 4/2.5 dialysis solution, Continuous  prismaSATE BGK 4/2.5 dialysis solution, Continuous  prismaSATE BGK 4/2.5 dialysis solution, Continuous  magnesium sulfate 1000 mg in dextrose 5% 100 mL IVPB, PRN  calcium gluconate 1,000 mg in dextrose 5 % 100 mL IVPB, PRN   Or  calcium gluconate 2,000 mg in dextrose 5 % 100 mL IVPB, PRN   Or  calcium gluconate 3,000 mg in dextrose 5 % 100 mL IVPB, PRN   Or  calcium gluconate 4,000 mg in dextrose 5 % 100 mL IVPB, PRN  sodium phosphate 6 mmol in dextrose 5 % 250 mL IVPB, PRN   Or  sodium phosphate 12 mmol in dextrose 5 % 250 mL IVPB, PRN   Or  sodium phosphate 18 mmol in dextrose 5 % 500 mL IVPB, PRN   Or  sodium phosphate 24 mmol in dextrose 5 % 500 mL IVPB, PRN  heparin (porcine) 5,000 Units in sodium chloride 0.9 % 20 mL infusion, Continuous  propofol injection, Titrated  fentaNYL (SUBLIMAZE) 1,000 mcg in sodium chloride 0.9% 100 mL infusion, Continuous  norepinephrine (LEVOPHED) 16 mg in dextrose 5 % 250 mL infusion, Continuous  ceFAZolin (ANCEF) 2000 mg in sterile water 20 mL IV syringe, Q12H  hydrocortisone sodium succinate PF (SOLU-CORTEF) injection 50 mg, Q6H  insulin regular (HUMULIN R;NOVOLIN R) 100 Units in sodium chloride 0.9 % 100 mL infusion, Continuous  ondansetron (ZOFRAN-ODT) disintegrating tablet 4 mg, Q8H PRN   Or  ondansetron (ZOFRAN) injection 4 mg, Q6H PRN  chlorhexidine (PERIDEX) 0.12 % solution 15 mL, BID        Allergies:  Januvia [sitagliptin], Metformin and related, Vancomycin, and Mustard oil [allyl isothiocyanate]     Review of Systems:   A 14 point review of symptoms completed. Pertinent positives identified in the HPI, all other review of symptoms negative as below. Objective:     Vitals:    01/24/22 0600 01/24/22 0700 01/24/22 0800 01/24/22 0914   BP: 111/64 93/65 96/84    Pulse: 71 70 64    Resp: 30 30 30    Temp: (!) 90.3 °F (32.4 °C) (!) 90.6 °F (32.6 °C)  (!) 91.5 °F (33.1 °C)   TempSrc: Bladder Core  Core   SpO2: 98% 98% 97%    Weight: 293 lb 4.8 oz (133 kg)      Height:          Weight: 293 lb 4.8 oz (133 kg)       PHYSICAL EXAM:    General:   Intubated/sedated   Head:  Normocephalic, atraumatic   Eyes:  Conjunctiva/corneas clear, anicteric sclerae    Nose: Nares normal, no drainage or sinus tenderness   Throat: No abnormalities of the lips, oral mucosa or tongue. Neck: Trachea midline. Neck supple with no lymphadenopathy, thyroid not enlarged, symmetric, no tenderness/mass/nodules, JVP assessment is difficult with increased neck girth, CVC right neck   Lungs:   Clear to auscultation bilaterally, no wheezes, no rales, no respiratory distress   Chest Wall:  No deformity or tenderness to palpation   Heart:   Irregular, rate controlled 70s at this time, variable intensity S1, normal S2, no murmur, no rub, no S3/S4, PMI non-palpable due to body habitus. Abdomen:    Obese abdomen, soft, non-tender, with hypoactive bowel sounds.  No masses, no hepatosplenomegaly   Extremities: No cyanosis, clubbing, trace pitting edema lower extremities   Vascular: 2+ radial, reduced dorsalis pedis and posterior tibial pulses bilaterally. Brisk carotid upstrokes without carotid bruit. Skin: Skin color, texture, turgor are normal with no rashes or ulceration.     Pysch:  Cannot assess due to patient condition   Neurologic: Cannot assess due to patient condition        Labs:   CBC:   Lab Results   Component Value Date    WBC 14.8 01/24/2022    RBC 3.64 01/24/2022    HGB 11.4 01/24/2022    HCT 33.8 01/24/2022    MCV 92.7 01/24/2022    RDW 13.9 01/24/2022     01/24/2022     CMP:  Lab Results   Component Value Date     01/24/2022    K 3.7 01/24/2022    K 3.7 01/23/2022    CL 97 01/24/2022    CO2 23 01/24/2022    BUN 37 01/24/2022    CREATININE 3.0 01/24/2022    GFRAA 28 01/24/2022    AGRATIO 0.8 01/22/2022    LABGLOM 23 01/24/2022    LABGLOM 103 09/14/2015    GLUCOSE 194 01/24/2022    PROT 6.1 01/23/2022    CALCIUM 8.5 01/24/2022    BILITOT 1.5 01/23/2022    ALKPHOS 351 01/23/2022     01/23/2022     01/23/2022     PT/INR:  No results found for: PTINR  HgBA1c:  Lab Results   Component Value Date    LABA1C 9.7 10/14/2021     Lab Results   Component Value Date    CKTOTAL 44 12/08/2020    TROPONINI <0.01 01/24/2022       Lab Results   Component Value Date    CHOL 269 (H) 11/10/2020    CHOL 296 (H) 12/21/2018    CHOL 213 (H) 09/02/2014     Lab Results   Component Value Date    TRIG 1,114 (H) 11/10/2020    TRIG 1,289 (H) 12/21/2018    TRIG 725 (H) 09/02/2014     Lab Results   Component Value Date    HDL 31 (L) 10/14/2021    HDL 26 (L) 03/11/2021    HDL 25 (L) 11/10/2020     Lab Results   Component Value Date    LDLCALC see below 10/14/2021    [address] see below 03/11/2021    LDLCALC see below 11/10/2020     Lab Results   Component Value Date    LABVLDL see below 10/14/2021    LABVLDL see below 03/11/2021    LABVLDL see below 11/10/2020     No results found for: [facility]     Cardiac Data: EKG 1/22/2022: Personally reviewed with my interpretation: Normal sinus rhythm with T wave abnormality inferiorly  EKG 1/23/2022: Personally reviewed with my interpretation: Atrial fibrillation with heart rate 150 bpm, diffuse ST abnormality, low voltage precordial leads  EKG 1/23/2022: Personally reviewed with my interpretation: Atrial fibrillation with heart rate 140 bpm, ST and T wave abnormality, low voltage precordial leads    Telemetry personally reviewed: Atrial fibrillation with rates up to 110-115 bpm, presently 70    Echo: 12/2014 limited  Summary   This is a limited study for left ventricular function. .   Left ventricular function is low normal with ejection fraction estimated at   50 %. No regional wall motion abnormalities are noted. Stress Test: 9/2014  Impression:   1. No evidence of stress induced myocardial ischemia. 2. Fixed perfusion abnormalities at the apex may represent chest   wall attenuation or remote infarct. Please correlate with ECG   findings. 3. Left ventricular hypertrophy with severe hypokinesia. 4. Significantly reduced LVEF 26%. Echo 9/2014  Conclusions      Summary   The left ventricular systolic function is severely reduced with an ejection   fraction of 20-25%. Left ventricle size is normal. Global left ventricular hypokinesia is   present. Mild LVH. The mitral valve is structurally and functionally normal.   Trace mitral valve regurgitation noted. The left atrium is mildly dilated. Normal right ventricular size and function. The tricuspid valve is structurally and functionally normal.   No evidence of tricuspid valve regurgitation. There is left pleural effusion. Impression and Plan:      1. Acute hypoxic respiratory failure in the setting of #5/#  2. Septic shock with MSSA bacteremia with possible source of left hand burn  3. Cardiopulmonary arrest, asystole, requiring CPR with ROSC and post arrest rhythm of atrial fibrillation  4.

## 2022-01-24 NOTE — CONSULTS
[facility] Consult Note      General Gama     : 1975    DATE OF VISIT:  2022  DATE OF ADMISSION:  2022       Subjective:     General Gama is a 42 y.o. male whom I've been asked to see by Dr. Laurence Henley for MSSA bacteremia. Chief Complaint   Patient presents with    Altered Mental Status     Pt with brief LOC PTA      HPI:  I've known Mr. Gallo Rosenberg for a couple of different episodes of diabetic foot infection, treated with surgery, antibiotics, HBO therapy at one point, etc. I last saw him about a year ago, when his most recent diabetic foot ulcer was very close to being healed, and when we stopped his HBO therapy. I'm not sure if he continued to see Dr. Bandar Jc in follow-up for general podiatric care after that. He's currently sedated, unresponsive on the ventilator, so I can't get any direct history from him. It sounds like he had a few days of fever and constitutional symptoms, I think initially resisted seeing a physician or coming to the hospital for care, but was ultimately brought in by his wife when he developed a bit of confusion, lethargy, I believe a syncopal episode. Though he had a high-grade fever reported at home, he was actually afebrile on arrival here, with fairly stable vital signs (HR 82, /52), but he was severely hyperglycemic, with a leukocytosis, acute renal failure and a lactic acidosis. Over the last couple of days, he's had a worrying, deteriorating course with dropping BP, need for vasopressor support, a cardiac arrest, now obviously intubated with acute respiratory failure. Still receiving some sedation, also undergoing a hypothermia protocol since the arrest. Also starting on CRRT. Admission blood cultures came back with MSSA, he's currently on Ancef, I believe there are plans for an echo today, and I was asked to comment on ongoing treatment of his Staph bacteremia.  The one potential source that was noted during his admission work-up was a left hand burn, though it was said not to be overtly cellulitic at the time. Mr. Jessica Gaona has a past medical history of Acute osteomyelitis of right hallux ([address] Cellulitis of left foot, CHF (congestive heart failure) ([address] Chronic osteomyelitis of left foot ([address] Closed displaced fracture of distal phalanx of right little finger, Clostridium difficile infection, Diabetic ulcer of left forefoot associated with type 2 diabetes mellitus, with necrosis of bone ([address] Diabetic ulcer of right great toe associated with type 2 diabetes mellitus, with necrosis of bone ([address] Failed soft 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 toe amputation site, History of hyperbaric oxygen therapy, Migraine, Possible perforated tympanic membrane, Post-op hematoma of left foot, Recurrent otitis media, Tear of medial meniscus of left knee, Tobacco use, and Toe osteomyelitis, left ([address] He has a past surgical history that includes Tonsillectomy; [place] tooth extraction; Knee arthroscopy (Left, [id number]); Toe amputation (Right, 8/23/2019); other surgical history (Left, 07/24/2020); Toe amputation (Left, 7/24/2020); and Toe amputation (Left, 10/22/2020). His family history includes Diabetes in his father and mother; High Blood Pressure in his father, maternal grandmother, maternal uncle, mother, and sister; High Cholesterol in his father, maternal uncle, and mother; Stroke in his father. Mr. Jessica Gaona reports that he quit smoking about 16 years ago. He has a 1.00 pack-year smoking history. He quit smokeless tobacco use about 16 years ago. He reports current alcohol use. He reports that he does not use drugs.     Current Facility-Administered Medications: famotidine (PEPCID) injection 20 mg, 20 mg, IntraVENous, Daily  sodium chloride flush 0.9 % injection 5-40 mL, 5-40 mL, IntraVENous, 2 times per day  sodium chloride flush 0.9 % injection 5-40 mL, 5-40 mL, IntraVENous, PRN  0.9 % sodium chloride infusion, 25 mL, IntraVENous, PRN  acetaminophen (TYLENOL) tablet 650 mg, 650 mg, Oral, Q6H PRN **OR** acetaminophen (TYLENOL) suppository 650 mg, 650 mg, Rectal, Q6H PRN  glucose (GLUTOSE) 40 % oral gel 15 g, 15 g, Oral, PRN  glucagon (rDNA) injection 1 mg, 1 mg, IntraMUSCular, PRN  dextrose 5 % solution, 100 mL/hr, IntraVENous, PRN  dextrose bolus (hypoglycemia) 10% 125 mL, 125 mL, IntraVENous, PRN **OR** dextrose bolus (hypoglycemia) 10% 250 mL, 250 mL, IntraVENous, PRN  prismaSATE BGK 4/2.5 dialysis solution, , Dialysis, Continuous  prismaSATE BGK 4/2.5 dialysis solution, , Dialysis, Continuous  prismaSATE BGK 4/2.5 dialysis solution, , Dialysis, Continuous  magnesium sulfate 1000 mg in dextrose 5% 100 mL IVPB, 1,000 mg, IntraVENous, PRN  calcium gluconate 1,000 mg in dextrose 5 % 100 mL IVPB, 1,000 mg, IntraVENous, PRN **OR** calcium gluconate 2,000 mg in dextrose 5 % 100 mL IVPB, 2,000 mg, IntraVENous, PRN **OR** calcium gluconate 3,000 mg in dextrose 5 % 100 mL IVPB, 3,000 mg, IntraVENous, PRN **OR** calcium gluconate 4,000 mg in dextrose 5 % 100 mL IVPB, 4,000 mg, IntraVENous, PRN  sodium phosphate 6 mmol in dextrose 5 % 250 mL IVPB, 6 mmol, IntraVENous, PRN **OR** sodium phosphate 12 mmol in dextrose 5 % 250 mL IVPB, 12 mmol, IntraVENous, PRN **OR** sodium phosphate 18 mmol in dextrose 5 % 500 mL IVPB, 18 mmol, IntraVENous, PRN **OR** sodium phosphate 24 mmol in dextrose 5 % 500 mL IVPB, 24 mmol, IntraVENous, PRN  heparin (porcine) 5,000 Units in sodium chloride 0.9 % 20 mL infusion, , Dialysis, Continuous  propofol injection, 5-50 mcg/kg/min, IntraVENous, Titrated  fentaNYL (SUBLIMAZE) 1,000 mcg in sodium chloride 0.9% 100 mL infusion, 12.5-200 mcg/hr, IntraVENous, Continuous  norepinephrine (LEVOPHED) 16 mg in dextrose 5 % 250 mL infusion, 2-100 mcg/min, IntraVENous, Continuous  ceFAZolin (ANCEF) 2000 mg in sterile water 20 mL IV syringe, 2,000 mg, IntraVENous, Q12H  hydrocortisone sodium succinate PF (SOLU-CORTEF) injection 50 mg, 50 mg, IntraVENous, Q6H  insulin regular (HUMULIN R;NOVOLIN R) 100 Units in sodium chloride 0.9 % 100 mL infusion, 0.5 Units/hr, IntraVENous, Continuous  chlorhexidine (PERIDEX) 0.12 % solution 15 mL, 15 mL, Mouth/Throat, BID     This is day 2 of Ancef, and he's also on stress-dose steroids. Allergies: Januvia [sitagliptin], Metformin and related, Vancomycin, and Mustard oil [allyl isothiocyanate]    Pertinent items from the review of systems are discussed in the HPI; the remainder of the ROS was reviewed and is negative. Objective:     Vital signs over the last 24 hours:  Temp  Av.6 °F (34.2 °C)  Min: 89.4 °F (31.9 °C)  Max: 100.5 °F (38.1 °C)  Pulse  Av.8  Min: 57  Max: 532  Systolic (76JGU), OAI:076 , Min:84 , YHL:168   Diastolic (12MUF), LH, Min:49, Max:103  Resp  Av.9  Min: 19  Max: 31  SpO2  Av.2 %  Min: 81 %  Max: 100 %    Constitutional:  well-developed, well-nourished, sedated, unresponsive, on the vent  Psychiatric:  Unable to assess   Eyes:  pupils equal, round, constricted, sluggishly reactive to light; sclerae anicteric, conjunctivae not pale  ENT:  atraumatic; oral mucosa moist, no thrush or ulcers; orally intubated  Resp:  lungs coarse to auscultation BL, decreased at the bases, a few crackles; not a large degree of secretions from the ETT; 40% FIO2 currently. Cardiovascular:  heart regular, tachy, decreased heart sounds, no murmur able to be heard; generally mild lower extremity edema (but see below); no IV phlebitis; right IJ CVC in place, and a right femoral CRRT line  GI:  abdomen soft, NT, ND, hypoactive bowel sounds, no palpable masses or organomegaly  Musculoskeletal:  no clubbing, cyanosis or petechiae; extremities with no gross effusions, joint misalignment or acute arthritis  Skin: warm, dry, normal turgor; no acute rash, no peripheral stigmata of endocarditis. The three photos below are of his left hand burn, right shin chronic changes, and left foot.  That left hand appears to be a third degree burn, some sloughy burn tissue and eschar, some reactive erythema, perhaps a very mild cellulitis but nothing too impressive, some fibrinous debris, no pus; those right leg lesions are chronic and recurrent, perhaps venous, probably more likely old necrobiosis lesions. The left foot is noticeably warmer than the right, especially with the hypothermia device in place; that small hemorrhagic bulla is closed, but around that there is a large area of swelling, fluctuance, very mild erythema; no obvious portals of entry for a deep infection there (like an unstable callus, plantar ulcer, obvious puncture wound, paronychia, etc). ______________________________    Recent Labs     01/24/22  0815 01/24/22  0200 01/23/22 2000   WBC 14.8* 19.2* 24.7*   HGB 11.4* 12.3* 11.4*   HCT 33.8* 36.5* 33.6*   MCV 92.7 92.9 91.0   * 148 166     Lab Results   Component Value Date    CREATININE 3.6 (H) 01/24/2022     Lab Results   Component Value Date    LABALBU 2.4 (L) 01/24/2022     Lab Results   Component Value Date     (H) 01/24/2022     (H) 01/24/2022    ALKPHOS 320 (H) 01/24/2022    BILITOT 1.8 (H) 01/24/2022      Lab Results   Component Value Date    LABA1C 10.6 01/23/2022     Other recent pertinent labs: Anion gap from 19 to 23 to 13. Lactate from 3.8 to 2.1. Troponins < 0.01. Amylase 76, lipase 128. WBC diff with lymphopenia, 6 bands. PT from 16 to 19\", fibrinogen high. UA no pyuria, no hematuria.   ______________________________    Recent pertinent micro results:  Two admission BCx with Staph aureus, mecA gene negative, final Abx testing pending. Two repeat BCx in progress. Flu and COVID negative.        Sputum Gram stain with 2+ WBC, 1+ GPC, Cx pending.  ______________________________    Recent imaging results (last 7 days):     XR CHEST PORTABLE    Result Date: 1/24/2022  Low lung volumes with cardiomegaly and vascular congestion, as well as patchy airspace disease bilaterally, similar to the previous exam.     XR CHEST PORTABLE    Result Date: 1/23/2022  Status post advancement of right internal jugular central line with distal tip now visualized near region of junction of superior vena cava and right atrium. No evidence of pneumothorax. XR CHEST PORTABLE    Result Date: 1/23/2022  Improved lung volumes. Bilateral perihilar opacification, edema versus infiltrate. Satisfactory position of endotracheal tube. Central line tip in either the distal brachiocephalic vein or proximal SVC. XR CHEST PORTABLE    Result Date: 1/23/2022  Cardiomegaly with left mid and lower lung infiltrates. Support tubes as described above. XR CHEST PORTABLE    Result Date: 1/22/2022  Low lung volumes. No acute cardiopulmonary disease. XR CHEST 1 VIEW    Result Date: 1/23/2022  Limited chest as outlined above. Bilateral perihilar opacification, edema versus infiltrate.   ____________________    Initial EKG showed NSR with a long QT. Follow-up EKGs showed rapid Afib with significant ST changes. Assessment:     Patient Active Problem List   Diagnosis Code    Hypertension I10    Uncontrolled type 2 diabetes mellitus with diabetic polyneuropathy ([address] E11.42, E11.65    Cardiomyopathy ([address] I42.9    Mixed hyperlipidemia E78.2    Hyperglycemia R73.9    Allergic rhinitis J30.9    Osteoarthritis M19.90    Acute renal failure (HCC) N17.9    MSSA bacteremia R78.81, B95.61    Third degree burn of left hand T23.302A    Syncope R55    Acute hypoxemic respiratory failure (HCC) J96.01    Septic shock (HCC) A41.9, R65.21    Cardiopulmonary arrest ([facility]) I46.9    Abscess of left foot L02.612     Assessment of today's active condition(s):     -- Background of uncontrolled DM2, neuropathy, no known PAD, multiple prior diabetic foot ulcers, infections, surgeries.  Most recent wounds were at the left 1st and 2nd ray, but they had been healed for just about a year. -- Admission this time with a fairly nonfocal sepsis syndrome, at least in terms of symptoms, complicated by shock, acute renal failure, lactic acidosis, then cardiac arrest, resuscitation, acute respiratory failure, rapid Afib. Currently with vent support, sedation, hypothermia protocol post-arrest, vasopressors, insulin drip, and CRRT. Found to have MSSA bacteremia. -- The left hand burn could have been a portal of entry for a Staph bacteremia, and maybe one could argue that there's a degree of cellulitis there, but I think not enough to explain such severe illness. An acute, necrotizing Staph pneumonia can certainly make people this sick, but his CXRs look more like CHF / pulmonary edema to me, and I don't believe he had respiratory symptoms at home. An acute Staph aureus endocarditis could be a possibility, but that foot does look like it could be hiding a deep abscess, which could explain the bacteremia and septic shock. Interestingly, among all the organisms he had isolated from his foot in the autumn of 2020, MSSA was not one of them, so I don't think we can say that he had residual chronic osteo ever since then, only now to manifest as a growing abscess; would think it more likely that he had a minor recent foot injury that has since healed (maybe a puncture), leaving the deeper abscess behind. Treatment recs:     Await final blood cultures, both the initials and the repeats. Await TTE. Continue Ancef at current dose. I re-dressed the left hand with Versatel, 4x4s and kerlix, just for now.   _______________________    I also think he needs to have the presumed left foot abscess drained as quickly as possible, to try to control his sepsis and shock.  I felt this was an emergency procedure with relatively low risk (since I would confine myself to the SQ space), and therefore did not receive explicit informed consent from the patient or family beforehand. I cleansed the left dorsal foot with some alcohol first. Then used a #11 blade to first just incise that small hemorrhagic lesion, but that was all intracutaneous, with just a bit of pink dermal tissue at the base, no pus there. I then cleansed the area again, used an 18g needle and 10 cc syringe to puncture through that area and into the SQ space, and was able to aspirate a few mL of lightly bloody and purulent fluid -- sending that for Gram stain and aerobic culture. Given the large area of fluctuance, I then cleaned the area a third time, and used a #11 blade to make an approximately 1 - 1.5 cm incision through the skin and into the SQ space, and then with manual pressure and a couple of cotton-tipped swabs, was able to drain a large amount of purulent material, becoming slightly bloody right at the end. The extent of the abscess was quite impressive, toward the medial foot, forefoot, and I was at least a few cm deep, perhaps down to the muscular tissue between the metatarsals, if I didn't actually probe bone itself. When drainage became minimal, I packed the area with some 1/2\" iodoform, dressed the foot with gauze and Kerlix. He tolerated this well, with no apparent injury, and just a small amount of bleeding controlled with pressure.  ________________________    Will get an XR left foot at this point. Will ask Dr. Bandar Jc to see him, for consideration of further imaging, and perhaps more aggressive drainage and debridement, once we see how he does overall in the coming hours to couple of days. Also adding a CPK to this AM's labs, especially given the elevated AST > ALT, to see if there could be some signs of myositis in the foot (though after his cardiac arrest and resuscitation, I'm not sure how specific an elevated CK level would be right now). Prognosis obviously quite guarded. D/W Dr. Chaka Prieto.      Electronically signed by Edson Cotton MD on 1/24/2022 at 9:57 AM.

## 2022-01-24 NOTE — PROGRESS NOTES
Spoke to Kobe Flannery and notified him JOHN is scheduled for 8:30 on 1/24/22. RN aware patient needs to be NPO and no tube feed orders after midnight tonight.

## 2022-01-24 NOTE — PROGRESS NOTES
Pharmacy to Manage Heparin Infusion per [facility]    Dx: Afib  Pt wt = _101kg__ (will use adjusted wt if actual body weight > 120% ideal body weight). Baseline anti-Xa and/or aPTT =42.3    Oral factor Xa-inhibitors may alter and elevate anti-Xa levels used for unfractionated heparin monitoring. As a result, anti-Xa monitoring is not accurate while Xa-inhibitor activity is detectable. Utilize aPTT monitoring when patient received an oral factor Xa-inhibitor (apixaban, betrixaban, edoxaban or rivaroxaban) within 72 hours prior to admission (please document last administration time). The goal is to allow a washout of oral factor Xa-inhibitors by using aPTT for 72 hours, then change to ant-Xa levels for UFH. Low Dose Heparin Infusion  Heparin 60 units/kg IVP bolus(4000 units) followed by Heparin infusion at 12 units/kg/hr (recommended initial max dose 1000 units./hr).   Recheck Anti-Xa  in 6 hours, 1900  Goal anti-Xa 0.3-0.7 IU/mL  Rob Sandhu Pharm D 1/24/202212:59 PM  .

## 2022-01-24 NOTE — PROGRESS NOTES
Pulmonary & Critical Care Medicine ICU Progress Note    CC: Hypoxemic respiratory failure    Events of Last 24 hours:   Levophed 6 mcg/min   Epinephrine off  Propofol 25 mcg/kg/min   Fentanyl 125 mcg/hr   Insulin drip 8.9 unit/hr  PEEP 5  FiO2 40%  CRRT keeping even       Vascular lines: IV:   Right IJ   Right femoral Vas-Cath     MV:   Vent Mode: AC/VC Rate Set: 30 bmp/Vt Ordered: 430 mL/ /FiO2 : 40 %  Recent Labs     22  2240 22  0200   PHART 7.367 7.466*   UKT5AQZ 34.2* 28.1*   PO2ART 48.5* 82.1       IV:   sodium chloride Stopped (22 1319)    dextrose      prismaSATE BGK 4/2.5 2,000 mL/hr at 22 0700    prismaSATE BGK 4/2.5 1,000 mL/hr at 22 0700    prismaSATE BGK 4/2.5 1,000 mL/hr at 22 0700    heparin 5000 units CRRT syringe 1.6 mL/hr at 22 1812    propofol 25 mcg/kg/min (22 0551)    fentaNYL 125 mcg/hr (22 06)    norepinephrine 7 mcg/min (22 06)    insulin 9.52 Units/hr (22 0700)       Vitals:  Blood pressure 111/64, pulse 71, temperature (!) 90.3 °F (32.4 °C), temperature source Bladder, resp. rate 30, height 6' 1\" (1.854 m), weight 293 lb 4.8 oz (133 kg), SpO2 98 %. on   Temp  Av.9 °F (34.4 °C)  Min: 89.4 °F (31.9 °C)  Max: 100.5 °F (38.1 °C)    Intake/Output Summary (Last 24 hours) at 2022 0704  Last data filed at 2022 0600  Gross per 24 hour   Intake 3127.75 ml   Output 1762 ml   Net 1365.75 ml     PE:  General: ill appearing. Intubated sedated. Eyes: PERRL. No sclera icterus. No conjunctival injection. ENT: No discharge. Pharynx clear. ET tube in place  Neck: Trachea midline. Normal thyroid. Resp: No accessory muscle use. Few crackles. No wheezing. Few rhonchi. CV: Regular rate. irregular rhythm. No mumur or rub. 1-2+ LE edema. GI: Non-tender. Non-distended. No masses. Skin: Warm and dry. No nodule on exposed extremities. No rash on exposed extremities. Lymph: No cervical LAD.  No supraclavicular LAD. M/S: No cyanosis. No joint deformity. No clubbing. Neuro: Intubated sedated. No response to painful stimuli. Psych: Noncommunicative unable to obtain      Scheduled Meds:   sodium chloride flush  5-40 mL IntraVENous 2 times per day    heparin (porcine)  5,000 Units SubCUTAneous 3 times per day    ceFAZolin 2000 mg in 20 mL SWFI IV Syringe  2,000 mg IntraVENous Q12H    hydrocortisone sodium succinate PF  50 mg IntraVENous Q6H    chlorhexidine  15 mL Mouth/Throat BID       Data:  CBC:   Recent Labs     01/23/22  1410 01/23/22 2000 01/24/22 0200   WBC 18.3* 24.7* 19.2*   HGB 11.1* 11.4* 12.3*   HCT 32.6* 33.6* 36.5*   MCV 91.1 91.0 92.9    166 148     BMP:   Recent Labs     01/23/22  1410 01/23/22 2000 01/24/22  0200   * 135* 133*   K 3.7 3.6 3.7   CL 90* 98* 97*   CO2 19* 24 23   PHOS 4.9 2.2* 1.7*   BUN 81* 54* 37*   CREATININE 7.6* 4.6* 3.0*     LIVER PROFILE:   Recent Labs     01/22/22  1350 01/23/22  1410 01/23/22 2000   * 405* 401*   ALT 92* 197* 187*   LIPASE  --   --  128.0*   BILIDIR  --  1.1* 0.9*   BILITOT 1.3* 1.6* 1.5*   ALKPHOS 210* 330* 351*       Microbiology:  1/22 [facility] Staphylococcus  1/22 COVID-19 and influenza negative  1/23 tracheal aspirate  1/24 BC      Imaging:  Chest x-ray 1/24 imaging was reviewed by me and showed   Satisfactory ET tube position as well as CVC position  Left-sided airspace disease- better   Cardiomegaly with pulmonary edema     ASSESSMENT:  · Acute hypoxemic respiratory failure  · Septic shock  · MSSA bacteremia  · L foot abscess drained 1/24/2022  · Cardiopulmonary arrest x2 1/23/2022 required CPR  · Afib- rate controlled   · Acute pulmonary edema/fluid overload  · Acute kidney injury  · Uremia  · DM with hyperglycemia  · Left hand burn  · History of cardiomyopathy-last echo 2014 EF 50%     PLAN:  · Mechanical ventilation as per my orders.  The ventilator was adjusted by me at the bedside for unstable, life threatening respiratory failure  · Change RR 24   · Follow ABG and chest x-ray while on the ventilator  · IV Propofol for sedation, target RASS -2, with daily spontaneous awakening trial   · Follow TG   · Fentanyl and Versed PRN, gtt as needed  · Head of bed 30 degrees or higher at all times  · Daily spontaneous breathing trial once PEEP less than 8, FiO2 less than 55%  · IV Ancef -ID has been consulted  · Follow cultures  · IV Levophed/epinephrine/vasopressin to keep MAP > 65 mmHg or SBP>90  · Mild to moderate hypothermia to a target temperature 32 to 34ºC for 24 hours. · Echocardiogram evaluate for vegetations  · Repeat blood culture at 72 hours  · Follow troponin- negative   · Nutrition: Tube feeding when able  · Insulin drip -  goal 140-180  · GI prophylaxis: Pepcid  · DVT prophylaxis: Start Heparin drip   · MRSA prophylaxis: Bactroban   · Code status: Full code   · Discussed with wife at the bedside      Total critical care time caring for this patient with life threatening, unstable organ failure, including direct patient contact, management of life support systems, review of data including imaging and labs, discussions with other team members and physicians is 31 minutes, excluding procedures.

## 2022-01-24 NOTE — PROGRESS NOTES
Patient temperature falling below set ignacio on [facility]. Currently 90.0. Set rate adjusted up to bring temperature back to target. HR dropped briefly to 36, with palpable pulse. HR currently 58-64. Will monitor.

## 2022-01-24 NOTE — PROGRESS NOTES
01/23/22 2258   Vent Information   Vent Type 980   Vent Mode AC/VC   Vt Ordered 430 mL   Rate Set 30 bmp   Peak Flow 75 L/min   Pressure Support 0 cmH20   FiO2  50 %   SpO2 97 %   SpO2/FiO2 ratio 194   Sensitivity 3   PEEP/CPAP 8   I Time/ I Time % 0 s   Humidification Source Heated wire   Humidification Temp 37   Humidification Temp Measured 37   Circuit Condensation Drained   Vent Patient Data   High Peep/I Pressure 0   Peak Inspiratory Pressure 31 cmH2O   Mean Airway Pressure 16 cmH20   Rate Measured 34 br/min   Vt Exhaled 857 mL   Minute Volume 16.7 Liters   I:E Ratio 1:2.20   Plateau Pressure 20 ICF06   Cough/Sputum   Sputum How Obtained Endotracheal;Suctioned   Cough Non-productive   Sputum Amount None   Sputum Color None   Tenacity None   Spontaneous Breathing Trial (SBT) RT Doc   Pulse 123   Breath Sounds   Right Upper Lobe Diminished   Right Middle Lobe Diminished   Right Lower Lobe Diminished   Left Upper Lobe Diminished   Left Lower Lobe Diminished   Additional Respiratory  Assessments   Resp 26   Position Semi-Springer's   Alarm Settings   High Pressure Alarm 40 cmH2O   Low Minute Volume Alarm 3 L/min   High Respiratory Rate 45 br/min   Patient Observation   Observations ETT SIZE 7.5, SECURED AT 24 LIP LINE. AMBU BAG AT HEAD OF BED. WATER GOOD.     ETT (adult)   Placement Date: 01/23/22   Preoxygenation: Yes  Technique: Direct laryngoscopy  Type: Cuffed  Tube Size: 7.5 mm  Insertion attempts: 1  Secured at: 24 cm  Measured From: Lips   Secured at 24 cm   Measured From [address] By [employer] tube wheeler   Site Condition Dry

## 2022-01-25 NOTE — PROGRESS NOTES
01/24/22 2331   Vent Information   Vent Type 980   Vent Mode AC/VC   Vt Ordered 430 mL   Rate Set 24 bmp   Peak Flow 57 L/min   Pressure Support 0 cmH20   FiO2  40 %   Sensitivity 3   PEEP/CPAP 5   I Time/ I Time % 0 s   Humidification Source Heated wire   Humidification Temp 37   Humidification Temp Measured 37   Vent Patient Data   High Peep/I Pressure 0   Peak Inspiratory Pressure 18 cmH2O   Mean Airway Pressure 9.9 cmH20   Rate Measured 24 br/min   Vt Exhaled 415 mL   Minute Volume 9.73 Liters   I:E Ratio 1:2.00   Plateau Pressure 16 [id number]   Spontaneous Breathing Trial (SBT) RT Doc   Pulse 65   Breath Sounds   Right Upper Lobe Diminished   Right Middle Lobe Diminished   Right Lower Lobe Diminished   Left Upper Lobe Diminished   Left Lower Lobe Diminished   Additional Respiratory  Assessments   Resp 21   Position Semi-Springer's   Alarm Settings   High Pressure Alarm 40 cmH2O   Low Minute Volume Alarm 3 L/min   Apnea (secs) 20 secs   High Respiratory Rate 45 br/min   Low Exhaled Vt  300 mL   ETT (adult)   Placement Date: 01/23/22   Preoxygenation: Yes  Technique: Direct laryngoscopy  Type: Cuffed  Tube Size: 7.5 mm  Insertion attempts: 1  Secured at: 24 cm  Measured From: Lips   Secured at 24 cm   Measured From [address]  (moved to center)   Secured By Commercial tube wheeler   Site Condition [employer]

## 2022-01-25 NOTE — PROGRESS NOTES
Patient updates given to [facility]. Patient continues rewarming slowly. CRRT running without incident. Care transferred.

## 2022-01-25 NOTE — PROGRESS NOTES
Progress Note    HISTORY     CC:  AMS          We are following for acute kidney injury       Subjective/   HPI:  Patient remains in the ICU in critical condition. On CRRT. Running even. Remains on vent and pressors. Rewarming      ROS:  Constitutional:  No fevers, hypothermic   Respiratory: On vent  :  Anuric     Social Hx:  Family at the bedside     Past Medical and Surgical History:  - Reviewed, no changes     EXAM       Objective/     Vitals:    01/25/22 1023 01/25/22 1037 01/25/22 1052 01/25/22 1106   BP:  (!) 75/58 (!) 73/52 (!) 85/55   Pulse: 64 64 66 64   Resp: 24 19 24 24   Temp:  93.6 °F (34.2 °C) 93.7 °F (34.3 °C) 93.7 °F (34.3 °C)   TempSrc:  Core Core Core   SpO2: 96% 96% 96% 96%   Weight:       Height:         24HR INTAKE/OUTPUT:      Intake/Output Summary (Last 24 hours) at 1/25/2022 1119  Last data filed at 1/25/2022 0700  Gross per 24 hour   Intake 1904.61 ml   Output 2080 ml   Net -175.39 ml     Constitutional:  Critically ill   Eyes:  Pupils reactive, sclera clear   Neck:  Normal thyroid, no masses   Cardiovascular:  Regular, no rub  Respiratory:   On vent, no wheezing  Psychiatry:  Sedated on vent   Abdomen: +bs, soft, nt, no masses   Musculoskeletal: No LE edema, no clubbing   Lymphatics:  No LAD in neck, no supraclavicular nodes       MEDICAL DECISION MAKING       Data/  Recent Labs     01/24/22  1900 01/25/22  0200 01/25/22  0805   WBC 15.8* 17.4* 20.7*   HGB 11.8* 12.2* 12.3*   HCT 34.7* 34.9* 35.6*   MCV 92.9 90.5 90.9   * 135 137     Recent Labs     01/24/22  1900 01/25/22  0000 01/25/22  0200 01/25/22  0400 01/25/22  0600 01/25/22  0805 01/25/22  1015   *  --  133*  --   --  131*  --    K 3.7   < > 4.0   < > 4.2 4.1 4.1     --  99  --   --  97*  --    CO2 23  --  25  --   --  22  --    GLUCOSE 164*  --  144*  --   --  220*  --    PHOS 2.7  --  1.4*  --   --  3.5  --    MG 2.50*  --  2.50*  --   --  2.40  --    BUN 31*  --  16  --   --  23*  -- CREATININE 2.8*  --  1.2  --   --  2.0*  --    LABGLOM 25*  --  >60  --   --  36*  --    GFRAA 30*  --  >60  --   --  44*  --     < > = values in this interval not displayed. Assessment/     RODRICK likely related to prerenal factors in the setting of sepsis, use of diuretics and losartan. UA is not suggestive of glomerulonephritis.   Patient did not respond to IV fluid and developed acute pulmonary edema and renal replacement therapy initiated on 1/23/22     -Acute pulmonary edema              Status post intubation     -Acute hypoxic respiratory failure     -cardio respiratory arrest      -Sepsis with MSSA bacteremia     -Morbid obesity     -Diabetes, uncontrolled    Plan/     Continue CRRT  Running even  Follow labs     -----------------------------  Darya Irwin M.D.   [facility]

## 2022-01-25 NOTE — PROGRESS NOTES
Pharmacy-Low dose Heparin Drip  Current heparin rate= 1000 units/hr (10 ml/hr)  Anti-Xa @ 0100 = 0.38 IU/ml  Goal 0.3-0.7 IU/ml  Per protocol, continue with same rate of 1000 units/hr (10 ml/hr).   Next Anti-xa due 1/25 @ 0800

## 2022-01-25 NOTE — PROGRESS NOTES
Spoke with Vaishnavi Yarbrough, called the floor and left a message for Edgar Garcia RN to let him know that the JOHN is tentatively on hold until infectious disease dr sees patient. Patient is to still be NPO until final decision on JOHN is made.

## 2022-01-25 NOTE — PROGRESS NOTES
CARDIOLOGY PROGRESS NOTE      Patient Name: Alessandro Gomez  Date of admission: 1/22/2022  1:32 PM  Admission Dx: Hyperglycemia [R73.9]  RODRICK (acute kidney injury) ([facility] [address] [N17.9]  Acute renal failure, unspecified acute renal failure type ([facility] [address] [N17.9]  Syncope, unspecified syncope type [R55]  Reason for Consult: Cardiopulmonary arrest, CM, AF  Requesting Physician: Aidan Hermosillo MD  Primary Care physician: Melissa Matthew MD    Subjective:     Alessandro Gomez is a 42 y.o. patient with a prior medical history notable for poorly controlled diabetes mellitus type 2 with prior partial amputations bilat feet, CHF, tobacco abuse, who presented to the hospital with complaints of loss of consciousness, worsening mentation, dyspnea.      ED course/Vital signs on presentation: New oxygen requirement with sats 90% on 5L. Scr 5.6 (basleine 0.8), K 4.0, bicarb 19, BUN 68. Lactate 3.8. Glc 412. Trop <0.01 to 0.01 with flat trend. Elevated AST/ALT. CK >4500. CXR showed cardiomegaly with left mid and lower lung infiltrates. Rapid COVID 19 neg. Flu neg. Patient suffered recent left hand burn. Taking NSAIDs x 2 days for this. Found to be in RODRICK, resp failure on presentation, MSSA bacteremia noted. Condition deteriorated with code blue called 1/23 as patient found without pulse/not breathing. ACLS with ROSC. Coded 2nd time in route to ICU. Intubation/mechanical ventilation. Initiated on CRRT. No acute overnight events. Did convert to sinus rhythm from AF. Today, patient remains sedated on vent. Remains on 10 mcg levophed with MAP ~65, CRRT. Abscess L foot drained yesterday -cultures with MSSA as were initial blood cultures. Repeat cultures remain neg, will update tomorrow. No family at bedside.      Home Medications:  Were reviewed and are listed in nursing record and/or below  Prior to Admission medications    Medication Sig Start Date End Date Taking?  Authorizing Provider   Insulin Degludec (TRESIBA FLEXTOUCH) 200 UNIT/ML SOPN INJECT 76 UNITS INTO THE SKIN NIGHTLY 1/17/22  Yes Kimmie Notice, MD   losartan (COZAAR) 50 MG tablet TAKE 1 TABLET BY MOUTH DAILY 1/13/22  Yes Kimmie Notice, MD   tiZANidine (ZANAFLEX) 4 MG tablet TAKE 2 TABLETS BY MOUTH NIGHTLY 1/4/22  Yes Kimmie Notice, MD   carvedilol (COREG) 12.5 MG tablet TAKE 1 TABLET BY MOUTH 2 TIMES DAILY (WITH MEALS) 12/30/21  Yes Kimmie Notice, MD   rosuvastatin (CRESTOR) 20 MG tablet TAKE 1 TABLET BY MOUTH NIGHTLY 12/30/21  Yes Francia Chen MD   traZODone (DESYREL) 50 MG tablet TAKE 1 TABLET BY MOUTH NIGHTLY 12/7/21  Yes Kimmie Notice, MD   furosemide (LASIX) 20 MG tablet TAKE 1 TABLET BY MOUTH DAILY 12/7/21  Yes Kimmie Notice, MD   gabapentin (NEURONTIN) 400 MG capsule TAKE 2 CAPSULES BY MOUTH 3 TIMES DAILY FOR 90 DAYS. 11/30/21 2/28/22 Yes Kimmie Notice, MD   insulin lispro (HUMALOG KWIKPEN) 200 UNIT/ML SOPN pen Inject 25 Units into the skin 3 times daily (before meals) 10/14/21  Yes Kimime Notice, MD   glimepiride (AMARYL) 4 MG tablet TAKE 1 TABLET BY MOUTH EVERY MORNING (BEFORE BREAKFAST). 9/10/21  Yes Kimmie Hernandez, MD   PARoxetine (PAXIL) 10 MG tablet TAKE 1 TABLET BY MOUTH EVERY MORNING 9/10/21  Yes Kimmie Hernandez MD   blood glucose test strips (ONETOUCH ULTRA) strip USE TO TEST BLOOD GLUCOSE DAILY. DX:E11.9 3/5/21  Yes Kimmie Hernandez MD   Blood Glucose Monitoring Suppl (CONTOUR NEXT EZ) w/Device KIT Use to test glucose daily. DX:E11.9 12/10/20  Yes Kimmie Hernandez MD   Insulin Pen Needle 32G X 6 MM MISC Use with insulin daily . DX:E11.9 12/4/20  Yes Kimmie Hernandez MD   blood glucose monitor strips Use to test blood sugar daily.   DX:E11.9 12/4/20  Yes Kimmie Hernandez, MD   oxymetazoline (AFRIN) 0.05 % nasal spray 2 sprays by Nasal route daily Indications: prior to HBO   Yes Historical Provider, MD   loratadine (CLARITIN) 10 MG tablet Take 10 mg by mouth nightly as needed    Yes Historical Provider, MD   sildenafil (VIAGRA) 100 MG tablet TAKE 1 TABLET BY MOUTH AS NEEDED FOR ERECTILE DYSFUNCTION 8/27/19  Yes Marcella Mijares MD        CURRENT Medications:  famotidine (PEPCID) injection 20 mg, Daily  heparin (porcine) injection 4,000 Units, PRN  heparin (porcine) injection 2,000 Units, PRN  heparin (porcine) 25,000 Units in dextrose 5 % 250 mL infusion, Continuous  sodium chloride flush 0.9 % injection 5-40 mL, 2 times per day  sodium chloride flush 0.9 % injection 5-40 mL, PRN  0.9 % sodium chloride infusion, PRN  acetaminophen (TYLENOL) tablet 650 mg, Q6H PRN   Or  acetaminophen (TYLENOL) suppository 650 mg, Q6H PRN  glucose (GLUTOSE) 40 % oral gel 15 g, PRN  glucagon (rDNA) injection 1 mg, PRN  dextrose 5 % solution, PRN  dextrose bolus (hypoglycemia) 10% 125 mL, PRN   Or  dextrose bolus (hypoglycemia) 10% 250 mL, PRN  prismaSATE BGK 4/2.5 dialysis solution, Continuous  prismaSATE BGK 4/2.5 dialysis solution, Continuous  prismaSATE BGK 4/2.5 dialysis solution, Continuous  magnesium sulfate 1000 mg in dextrose 5% 100 mL IVPB, PRN  calcium gluconate 1,000 mg in dextrose 5 % 100 mL IVPB, PRN   Or  calcium gluconate 2,000 mg in dextrose 5 % 100 mL IVPB, PRN   Or  calcium gluconate 3,000 mg in dextrose 5 % 100 mL IVPB, PRN   Or  calcium gluconate 4,000 mg in dextrose 5 % 100 mL IVPB, PRN  sodium phosphate 6 mmol in dextrose 5 % 250 mL IVPB, PRN   Or  sodium phosphate 12 mmol in dextrose 5 % 250 mL IVPB, PRN   Or  sodium phosphate 18 mmol in dextrose 5 % 500 mL IVPB, PRN   Or  sodium phosphate 24 mmol in dextrose 5 % 500 mL IVPB, PRN  heparin (porcine) 5,000 Units in sodium chloride 0.9 % 20 mL infusion, Continuous  propofol injection, Titrated  fentaNYL (SUBLIMAZE) 1,000 mcg in sodium chloride 0.9% 100 mL infusion, Continuous  norepinephrine (LEVOPHED) 16 mg in dextrose 5 % 250 mL infusion, Continuous  ceFAZolin (ANCEF) 2000 mg in sterile water 20 mL IV syringe, Q12H  hydrocortisone sodium succinate PF (SOLU-CORTEF) injection 50 mg, Q6H  insulin regular (HUMULIN R;NOVOLIN R) 100 Units in sodium chloride 0.9 % 100 mL infusion, Continuous  chlorhexidine (PERIDEX) 0.12 % solution 15 mL, BID        Allergies:  Januvia [sitagliptin], Metformin and related, Vancomycin, and Mustard oil [allyl isothiocyanate]     Review of Systems:   A 14 point review of symptoms completed. Pertinent positives identified in the HPI, all other review of symptoms negative. Objective:     Vitals:    01/25/22 1000 01/25/22 1006 01/25/22 1021 01/25/22 1023   BP: (!) 76/60 (!) 76/60 (!) 85/59    Pulse: 65 65 65 64   Resp: 21 24 24 24   Temp:  93.4 °F (34.1 °C) 93.4 °F (34.1 °C)    TempSrc:  Core Core    SpO2: 95% 95% 96% 96%   Weight:       Height:          Weight: 295 lb 4.8 oz (133.9 kg)       PHYSICAL EXAM:    General:   Intubated/sedated   Head:  Normocephalic, atraumatic   Eyes:  Conjunctiva/corneas clear, anicteric sclerae    Nose: Nares normal, no drainage or sinus tenderness   Throat: No abnormalities of the lips, oral mucosa or tongue. Neck: Trachea midline. Neck supple with no lymphadenopathy, thyroid not enlarged, symmetric, no tenderness/mass/nodules, JVP assessment is difficult with increased neck girth, CVC right neck   Lungs:   Clear to auscultation bilaterally, no wheezes, no rales, no respiratory distress   Chest Wall:  No deformity or tenderness to palpation   Heart:  Regular rate and rhythm, distant s1/s2,no murmur, no rub, no S3/S4, PMI non-palpable due to body habitus. Abdomen:   Obese abdomen, soft, non-tender, with hypoactive bowel sounds. No masses, no hepatosplenomegaly   Extremities: No cyanosis, clubbing, trace pitting edema lower extremities. Cool hands/feet. Vascular: 2+ radial, reduced dorsalis pedis and posterior tibial pulses bilaterally. Brisk carotid upstrokes without carotid bruit.     Skin: Skin color, texture, turgor are normal with no rashes or ulceration. Pysch:  Cannot assess due to patient condition   Neurologic: Cannot assess due to patient condition     Levo 10 mcg         Labs:   CBC:   Lab Results   Component Value Date    WBC 20.7 01/25/2022    RBC 3.92 01/25/2022    HGB 12.3 01/25/2022    HCT 35.6 01/25/2022    MCV 90.9 01/25/2022    RDW 13.9 01/25/2022     01/25/2022     CMP:  Lab Results   Component Value Date     01/25/2022    K 4.1 01/25/2022    K 3.7 01/23/2022    CL 97 01/25/2022    CO2 22 01/25/2022    BUN 23 01/25/2022    CREATININE 2.0 01/25/2022    GFRAA 44 01/25/2022    AGRATIO 0.8 01/22/2022    LABGLOM 36 01/25/2022    LABGLOM 103 09/14/2015    GLUCOSE 220 01/25/2022    PROT 5.9 01/25/2022    CALCIUM 8.5 01/25/2022    BILITOT 1.6 01/25/2022    ALKPHOS 296 01/25/2022     01/25/2022     01/25/2022     PT/INR:  No results found for: PTINR  HgBA1c:  Lab Results   Component Value Date    LABA1C 10.6 01/23/2022     Lab Results   Component Value Date    CKTOTAL 1,549 (H) 01/25/2022    TROPONINI <0.01 01/25/2022         Interval Testing/Data:     Telemetry personally reviewed:     EKG 1/22/2022: Personally reviewed with my interpretation: Normal sinus rhythm with T wave abnormality inferiorly  EKG 1/23/2022: Personally reviewed with my interpretation: Atrial fibrillation with heart rate 150 bpm, diffuse ST abnormality, low voltage precordial leads  EKG 1/23/2022: Personally reviewed with my interpretation: Atrial fibrillation with heart rate 140 bpm, ST and T wave abnormality, low voltage precordial leads       TTE 1/24  Conclusions      Summary   Definity contrast administered. Left ventricular systolic function is reduced with ejection fraction   estimated at 35 %. There is global hypokinesis with regional wall variation. There is mild concentric left ventricular hypertrophy. Normal left ventricular diastolic filling pressure. Right ventricular systolic function is moderately reduced .    The right atrium is mildly dilated. Mild mitral regurgitation. Mild tricuspid regurgitation. Normal systolic pulmonary artery pressure (SPAP) estimated at 44 mmHg (RA   pressure 15 mmHg). Mild pulmonic regurgitation present. There is a small pericardial effusion noted. There is a pleural effusion. No valvular vegetations are visualization. Impression and Plan      1. Acute hypoxic respiratory failure in the setting of #5/#  2. Septic shock with MSSA bacteremia with possible source of likely left foot abscess  3. Cardiopulmonary arrest, asystole, requiring CPR with ROSC and post arrest rhythm of atrial fibrillation  4. Paroxysmal Atrial fibrillation   5. Volume overload  6. Acute kidney injury now requiring CRRT  7. Encephalopathy in the setting of uremia and sepsis  8. Cardiomyopathy with previously severe LV dysfunction with recovery of EF to 50% by echo 2014, 35% in setting of sepsis/arrest this admission   9. Acute liver injury, LFT's improving   10. Rhabdomyolysis - CPK down-trending        Patient Active Problem List   Diagnosis    Hypertension    Uncontrolled type 2 diabetes mellitus with diabetic polyneuropathy ([address]    Cardiomyopathy ([address]    Mixed hyperlipidemia    Hyperglycemia    Allergic rhinitis    Osteoarthritis    Acute renal failure ([address]    MSSA bacteremia    Third degree burn of left hand    Syncope    Acute hypoxemic respiratory failure ([address]    Septic shock ([address]    Cardiopulmonary arrest (HCC)    Abscess of left foot       PLAN:  1. TTE with no evidence of IE. Likely foot abscess as source per my discussion with ID/Dr. Galindo with cultures from wound sample growing staph aureus. Follow repeat cultures, if persistently positive, may then move forward with JOHN, suspicion for IE however, is lower. 2. EF decreased from prior - will need to add back best tolerated GDMT as he recovers. Unable to titrate at this time due to hemodynamics, renal function.  Please plan to start back carvedilol on clinical improvement on INPT basis. 3. Treatment of sepsis per ICU/hospitalist team; Wean pressors as tolerated   4. Nephrology following with management of CRRT  5. [id number] Score 3. Anti-coagulation with heparin. Transition to [address] upon clinical improvement. 6.  Plan for sleep testing on outpatient basis    He has converted from AF. Prolonged hospital course expected. If cultures return persistently positive, please contact our service as may need JOHN as above. Please plan to start back carvedilol on clinical improvement on INPT basis. We will plan to follow up shortly after discharge with Dr. Choco Wu for further cardiac evaluation as well as titration GDMT as tolerated. Cardiology will sign off for now. Call with any questions/concerns should they arise.        >35 min crit care time spent in chart review, discussion with critical care/consulting teams as well as direct assessment of this Complex patient. Patient remains critically ill.       I will address the patient's cardiac risk factors and adjusted pharmacologic treatment as needed. In addition, I have reinforced the need for patient directed risk factor modification. All questions and concerns were addressed to the patient/family. Alternatives to my treatment were discussed. Thank you for allowing us to participate in the care of Colten Zaidi. Please call me with any questions [phone].     René Ortiz MD, [facility]  Cardiovascular Disease  [facility]  [phone] [address] [place] [address]  [phone] [address] [place] [address]  1/25/2022 10:24 AM

## 2022-01-25 NOTE — PROGRESS NOTES
This note also relates to the following rows which could not be included:  FiO2  - Cannot attach notes to unvalidated device data  Resp - Cannot attach notes to unvalidated device data       01/25/22 0310   Vent Information   $Ventilation $Subsequent Day   Vent Type 980   Vent Mode AC/VC   Vt Ordered 430 mL   Rate Set 24 bmp   Peak Flow 57 L/min   Pressure Support 0 cmH20   SpO2 97 %   Sensitivity 3   PEEP/CPAP 5   I Time/ I Time % 0 s   Humidification Source Heated wire   Humidification Temp 37   Humidification Temp Measured 37   Circuit Condensation Drained   Vent Patient Data   High Peep/I Pressure 0   Peak Inspiratory Pressure 21 cmH2O   Mean Airway Pressure 11 cmH20   Rate Measured 24 br/min   Vt Exhaled 428 mL   Minute Volume 10.3 Liters   I:E Ratio 1:2.00   Plateau Pressure 19 [id number]   Cough/Sputum   Sputum How Obtained Endotracheal;Suctioned   Cough Non-productive   Sputum Amount None   Spontaneous Breathing Trial (SBT) RT Doc   Pulse 66   Breath Sounds   Right Upper Lobe Diminished   Right Middle Lobe Diminished   Right Lower Lobe Diminished   Left Upper Lobe Diminished   Left Lower Lobe Diminished   Additional Respiratory  Assessments   Position Semi-Springer's   Alarm Settings   High Pressure Alarm 40 cmH2O   Low Minute Volume Alarm 3 L/min   Apnea (secs) 20 secs   High Respiratory Rate 45 br/min   Low Exhaled Vt  300 mL   ETT (adult)   Placement Date: 01/23/22   Preoxygenation: Yes  Technique: Direct laryngoscopy  Type: Cuffed  Tube Size: 7.5 mm  Insertion attempts: 1  Secured at: 24 cm  Measured From: Lips   Secured at 24 cm   Measured From [address] By Commercial tube wheeler   Site Condition Dry

## 2022-01-25 NOTE — PLAN OF CARE
Problem: Falls - Risk of:  Goal: Will remain free from falls  Description: Will remain free from falls  Outcome: Ongoing  Note: Fall precautions in place. Goal: Absence of physical injury  Description: Absence of physical injury  Outcome: Ongoing     Problem: Skin Integrity:  Goal: Will show no infection signs and symptoms  Description: Will show no infection signs and symptoms  Outcome: Ongoing  Note: Patient turned/repositioned every 2 hours and as needed to maintain and improve skin integrity. Goal: Absence of new skin breakdown  Description: Absence of new skin breakdown  Outcome: Ongoing     Problem: Confusion - Acute:  Goal: Absence of continued neurological deterioration signs and symptoms  Description: Absence of continued neurological deterioration signs and symptoms  Outcome: Ongoing  Goal: Mental status will be restored to baseline  Description: Mental status will be restored to baseline  Outcome: Ongoing     Problem: Discharge Planning:  Goal: Ability to perform activities of daily living will improve  Description: Ability to perform activities of daily living will improve  Outcome: Ongoing  Goal: Participates in care planning  Description: Participates in care planning  Outcome: Ongoing     Problem: Injury - Risk of, Physical Injury:  Goal: Will remain free from falls  Description: Will remain free from falls  Outcome: Ongoing  Note: Fall precautions in place.    Goal: Absence of physical injury  Description: Absence of physical injury  Outcome: Ongoing     Problem: Mood - Altered:  Goal: Mood stable  Description: Mood stable  Outcome: Ongoing  Goal: Absence of abusive behavior  Description: Absence of abusive behavior  Outcome: Ongoing  Goal: Verbalizations of feeling emotionally comfortable while being cared for will increase  Description: Verbalizations of feeling emotionally comfortable while being cared for will increase  Outcome: Ongoing     Problem: Psychomotor Activity - Altered:  Goal:

## 2022-01-25 NOTE — PROGRESS NOTES
01/24/22 1943   Vent Information   Vent Type 980   Vent Mode AC/VC   Vt Ordered 430 mL   Rate Set 24 bmp   Peak Flow 57 L/min   Pressure Support 0 cmH20   FiO2  40 %   SpO2 97 %   SpO2/FiO2 ratio 242.5   Sensitivity 3   PEEP/CPAP 5   I Time/ I Time % 0 s   Vent Patient Data   High Peep/I Pressure 0   Peak Inspiratory Pressure 23 cmH2O   Mean Airway Pressure 10 cmH20   Rate Measured 24 br/min   Vt Exhaled 445 mL   Minute Volume 9.8 Liters   I:E Ratio 1:2.00   Plateau Pressure 21 [id number]   Static Compliance 28 mL/cmH2O   Dynamic Compliance 25 mL/cmH2O   Cough/Sputum   Sputum How Obtained Endotracheal;Suctioned   Cough Non-productive   Sputum Amount None   Spontaneous Breathing Trial (SBT) RT Doc   Pulse 67   Breath Sounds   Right Upper Lobe Diminished   Right Middle Lobe Diminished   Right Lower Lobe Diminished   Left Upper Lobe Diminished   Left Lower Lobe Diminished   Additional Respiratory  Assessments   Resp 24   Position Semi-Springer's   Alarm Settings   High Pressure Alarm 40 cmH2O   Low Minute Volume Alarm 3 L/min   Apnea (secs) 20 secs   High Respiratory Rate 45 br/min   Low Exhaled Vt  300 mL   ETT (adult)   Placement Date: 01/23/22   Preoxygenation: Yes  Technique: Direct laryngoscopy  Type: Cuffed  Tube Size: 7.5 mm  Insertion attempts: 1  Secured at: 24 cm  Measured From: Lips   Secured at 24 cm   Measured From Lips   ET Placement Right   Secured By Commercial tube wheeler   Site Condition Dry

## 2022-01-25 NOTE — PROGRESS NOTES
Patient care assumed, assessment completed as charted. Patient continues on ventilator, #75 at the 24 lip line. A/C 24, , FiO2 40%, PEEP 5. Right IJ CVC in place with Fentanyl infusing at 100mcg/hr, Propofol at 20mcg/kg/min, Levophed at 8mcg/min, Heparin at  10mL/hr, and Insulin per protocol. CRRT continues without incident via right femoral VasCath with even goal in progress. TTM in maintenance phase with rewarming to begin tonight. Left hand and left foot wrapped, with slight breakthrough drainage noted to left foot. OG in place to LIWS, rea catheter patent, bilateral soft wrist restraints in place for continued patient safety. No further needs assessed at this time. Will monitor.

## 2022-01-25 NOTE — PROGRESS NOTES
[facility] Infectious Disease Progress Note      Zachariah Philippe     : 1975    DATE OF VISIT:  2022  DATE OF ADMISSION:  2022       Subjective:     Zachariah Philippe is a 42 y.o. male whom I've been seeing for an MSSA bacteremia, foot abscess and septic shock. Since I last saw him, there hasn't been a lot of clinical change. Remains sedated, on the vent. Still on vasopressors (10 mcg norepi), still on CRRT, currently being rewarmed from his hypothermia protocol. Not a lot of ETT secretions, no diarrhea. Mr. Yeimi Yeager has a past medical history of Acute osteomyelitis of right hallux ([address] Cellulitis of left foot, CHF (congestive heart failure) ([address] Chronic osteomyelitis of left foot ([address] Closed displaced fracture of distal phalanx of right little finger, Clostridium difficile infection, Diabetic ulcer of left forefoot associated with type 2 diabetes mellitus, with necrosis of bone ([address] Diabetic ulcer of right great toe associated with type 2 diabetes mellitus, with necrosis of bone ([address] Failed soft 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 toe amputation site, History of hyperbaric oxygen therapy, Migraine, Possible perforated tympanic membrane, Post-op hematoma of left foot, Recurrent otitis media, Tear of medial meniscus of left knee, Tobacco use, and Toe osteomyelitis, left ([address]     Current Facility-Administered Medications: famotidine (PEPCID) injection 20 mg, 20 mg, IntraVENous, Daily  heparin (porcine) injection 4,000 Units, 4,000 Units, IntraVENous, PRN  heparin (porcine) injection 2,000 Units, 2,000 Units, IntraVENous, PRN  heparin (porcine) 25,000 Units in dextrose 5 % 250 mL infusion, 1,000 Units/hr, IntraVENous, Continuous  sodium chloride flush 0.9 % injection 5-40 mL, 5-40 mL, IntraVENous, 2 times per day  sodium chloride flush 0.9 % injection 5-40 mL, 5-40 mL, IntraVENous, PRN  0.9 % sodium chloride infusion, 25 mL, IntraVENous, PRN  acetaminophen (TYLENOL) tablet 650 mg, 650 mg, Oral, Q6H PRN **OR** acetaminophen (TYLENOL) suppository 650 mg, 650 mg, Rectal, Q6H PRN  glucose (GLUTOSE) 40 % oral gel 15 g, 15 g, Oral, PRN  glucagon (rDNA) injection 1 mg, 1 mg, IntraMUSCular, PRN  dextrose 5 % solution, 100 mL/hr, IntraVENous, PRN  dextrose bolus (hypoglycemia) 10% 125 mL, 125 mL, IntraVENous, PRN **OR** dextrose bolus (hypoglycemia) 10% 250 mL, 250 mL, IntraVENous, PRN  prismaSATE BGK 4/2.5 dialysis solution, , Dialysis, Continuous  prismaSATE BGK 4/2.5 dialysis solution, , Dialysis, Continuous  prismaSATE BGK 4/2.5 dialysis solution, , Dialysis, Continuous  magnesium sulfate 1000 mg in dextrose 5% 100 mL IVPB, 1,000 mg, IntraVENous, PRN  calcium gluconate 1,000 mg in dextrose 5 % 100 mL IVPB, 1,000 mg, IntraVENous, PRN **OR** calcium gluconate 2,000 mg in dextrose 5 % 100 mL IVPB, 2,000 mg, IntraVENous, PRN **OR** calcium gluconate 3,000 mg in dextrose 5 % 100 mL IVPB, 3,000 mg, IntraVENous, PRN **OR** calcium gluconate 4,000 mg in dextrose 5 % 100 mL IVPB, 4,000 mg, IntraVENous, PRN  sodium phosphate 6 mmol in dextrose 5 % 250 mL IVPB, 6 mmol, IntraVENous, PRN **OR** sodium phosphate 12 mmol in dextrose 5 % 250 mL IVPB, 12 mmol, IntraVENous, PRN **OR** sodium phosphate 18 mmol in dextrose 5 % 500 mL IVPB, 18 mmol, IntraVENous, PRN **OR** sodium phosphate 24 mmol in dextrose 5 % 500 mL IVPB, 24 mmol, IntraVENous, PRN  heparin (porcine) 5,000 Units in sodium chloride 0.9 % 20 mL infusion, , Dialysis, Continuous  propofol injection, 5-50 mcg/kg/min, IntraVENous, Titrated  fentaNYL (SUBLIMAZE) 1,000 mcg in sodium chloride 0.9% 100 mL infusion, 12.5-200 mcg/hr, IntraVENous, Continuous  norepinephrine (LEVOPHED) 16 mg in dextrose 5 % 250 mL infusion, 2-100 mcg/min, IntraVENous, Continuous  ceFAZolin (ANCEF) 2000 mg in sterile water 20 mL IV syringe, 2,000 mg, IntraVENous, Q12H  hydrocortisone sodium succinate PF (SOLU-CORTEF) injection 50 mg, 50 mg, IntraVENous, Q6H  insulin regular (HUMULIN R; NOVOLIN R) 100 Units in sodium chloride 0.9 % 100 mL infusion, 0.5 Units/hr, IntraVENous, Continuous  chlorhexidine (PERIDEX) 0.12 % solution 15 mL, 15 mL, Mouth/Throat, BID     This is day 3 of Ancef, still on stress-dose steroids as well. Allergies: Januvia [sitagliptin], Metformin and related, Vancomycin, and Mustard oil [allyl isothiocyanate]    Pertinent items from the review of systems are discussed in the HPI; the remainder of the ROS was reviewed and is negative. Objective:     Vital signs over the last 24 hours:  Temp  Av.9 °F (33.3 °C)  Min: 90.8 °F (32.7 °C)  Max: 93.2 °F (34 °C)  Pulse  Av.2  Min: 55  Max: 84  Systolic (17BZE), WLW:97 , Min:75 , NXC:203   Diastolic (23XDD), PUE:67, Min:46, Max:86  Resp  Av.8  Min: 16  Max: 27  SpO2  Av.1 %  Min: 94 %  Max: 100 %    Constitutional:  well-developed, well-nourished, sedated, unresponsive, on the vent; definitely had some intact brainstem reflexes today during exam  Psychiatric:  Unable to assess   Eyes:  pupils equal, round, constricted, sluggish but reactive to light; sclerae anicteric, conjunctivae not pale, no subconj bleed  ENT:  atraumatic; oral mucosa moist, no thrush or ulcers; orally intubated  Resp:  lungs coarse to auscultation BL, decreased at the bases, a few crackles; not a large degree of secretions from the ETT; 40% FIO2 currently. Cardiovascular:  heart regular, tachy, decreased heart sounds, no murmur able to be heard; generally mild lower extremity edema, mild-mod LLE; no IV phlebitis; right IJ CVC in place, and a right femoral CRRT line, a couple of peripherals  GI:  abdomen soft, NT, mildly distended, hypoactive bowel sounds, no palpable masses or organomegaly  Musculoskeletal:  no clubbing, cyanosis or petechiae; extremities with no gross effusions, joint misalignment or acute arthritis  Skin: warm, dry, normal turgor; no acute rash, no peripheral stigmata of endocarditis.  Right lower leg changes chronic and stable; left hand not examined today. Left foot still swollen, warm, not quite sa much reactive erythema. When first applying manual pressure around the foot wound, I only got a small amount of blood, but then in attempting to repack it, there was definitely still some cloudy, purulent fluid mixed in; I'm able to probe to bone, and I get a sense that there are at least a couple of tissue planes and pockets that are still holding on to abscess. No malodor. And to clarify from yesterday, there was no sense of gas in the foot prior to my I&D, when looking at XR results; no anaerobic malodor, no crepitus. ______________________________    Recent Labs     01/25/22  0805 01/25/22  0200 01/24/22  1900   WBC 20.7* 17.4* 15.8*   HGB 12.3* 12.2* 11.8*   HCT 35.6* 34.9* 34.7*   MCV 90.9 90.5 92.9    135 125*     Lab Results   Component Value Date    CREATININE 2.0 (H) 01/25/2022     Lab Results   Component Value Date    LABALBU 2.3 (L) 01/25/2022     Lab Results   Component Value Date     (H) 01/25/2022     (H) 01/25/2022    ALKPHOS 296 (H) 01/25/2022    BILITOT 1.6 (H) 01/25/2022      Lab Results   Component Value Date    LABA1C 10.6 01/23/2022     Other recent pertinent labs:  Current anion gap 12. Troponins < 0.01. Those LFTs are coming down a bit. CK was 4512, I think probable from myositis in the foot.   ______________________________    Recent pertinent micro results:  Admission BCx (+) 2 of 2 with MSSA. Repeat BCx (-) so far, not quite 36 hours. RCx with WBC and GPC, Cx pending. WCx with WBC, GPC, MSSA on Cx.  ______________________________    Recent imaging results (last 7 days):     XR FOOT LEFT (2 VIEWS)    Result Date: 1/24/2022  1. Remote history of amputation at the 1st and 2nd digits. No evidence of osteomyelitis at prior resection site. 2. Subtle erosions at the 3rd MTP joint are new.   This is adjacent to soft tissue swelling at the prior amputation global hypokinesis with regional wall variation. There is mild concentric left ventricular hypertrophy. Normal left ventricular diastolic filling pressure. Right ventricular systolic function is moderately reduced. The right atrium is mildly dilated. Mild mitral regurgitation. Mild tricuspid regurgitation. Normal systolic pulmonary artery pressure (SPAP) estimated at 44 mmHg (RA pressure 15 mmHg). Mild pulmonic regurgitation present. There is a small pericardial effusion noted. There is a pleural effusion. No valvular vegetations are visualization. Assessment:     Patient Active Problem List   Diagnosis Code    Hypertension I10    Uncontrolled type 2 diabetes mellitus with diabetic polyneuropathy ([facility] E11.42, E11.65    Cardiomyopathy ([facility] I42.9    Mixed hyperlipidemia E78.2    Hyperglycemia R73.9    Allergic rhinitis J30.9    Osteoarthritis M19.90    Acute renal failure (HCC) N17.9    MSSA bacteremia R78.81, B95.61    Third degree burn of left hand T23.302A    Syncope R55    Acute hypoxemic respiratory failure (HCC) J96.01    Septic shock (HCC) A41.9, R65.21    Cardiopulmonary arrest (HCC) I46.9    Abscess of left foot L02.612     Assessment of today's active condition(s):      --          Background of uncontrolled DM2, neuropathy, no known PAD, multiple prior diabetic foot ulcers, infections, surgeries. Most recent wounds were at the left 1st and 2nd ray, but they had been healed for just about a year.     --          Admission this time with a fairly nonfocal sepsis syndrome, at least in terms of symptoms, complicated by shock, acute renal failure, lactic acidosis, then cardiac arrest, resuscitation, acute respiratory failure, rapid Afib. Currently with vent support, sedation, hypothermia protocol post-arrest, vasopressors, insulin drip, and CRRT. Found to have MSSA bacteremia, and a sizeable MSSA foot abscess.  Based on how far I'm able to probe, I do have a fairly high suspicion of active osteomyelitis in the foot, though those XR changes are very hard to discern osteo from Charcot changes alone. -- Systemically, things at least seem stable the last 24 hours, in terms of hemodynamics. Will be crucial to see how he's doing, including neurologically, once back up to normal body temp.      --          Third degree burn of left hand, but no strong signs of infection there, at least nothing deep. An acute Staph aureus endocarditis could still be a possibility, but the foot definitely seems the source of the initial bacteremia, and that's not shown to be sustained (so far). TTE does not have great sensitivity for endocarditis, but my clinical suspicion isn't too high. Treatment recs:     Continue Ancef at current dose. When he's eventually off CRRT, we'll need to adjust that. Await final report on repeat BCx. Can hold off on JOHN for now -- D/W Dr. Santosh Ayala yesterday. I repacked the left foot with 1/2\" iodoform, after expressing some more blood and purulence - need to continue that daily for now. When he's more stable, will plan on some better imaging of the foot (MRI), and then eventually more definitive surgical plans there. Simple local care for the left hand (Silvadene would be fine, Triad dressing, Santyl, honey, etc -- anything to encourage a bit of debridement and keep it moist).     Electronically signed by Melissa Zaidi MD on 1/25/2022 at 9:42 AM.

## 2022-01-25 NOTE — CONSULTS
CONSULT    Admit Date:  1/22/2022    Subjective:  42 y.o. male who is seen for evaluation of abscess left foot. Patient well known to me from previous wounds however has not been seen for about a year. Seen in ICU sedated and on ventilator. History obtained from chart. Had high grade fever prior to arrival. In ER found to have ARF. Had cardiac arrest x2 Sunday. Dr. Elisa Mena saw patient yesterday for ID consult and found abscess left foot. He did bedside I&D at that time. No odor or signs of gas infection at that time.          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    Chronic osteomyelitis of left foot ([address] 10/27/2020    Closed displaced fracture of distal phalanx of right little finger 4/8/2021    Clostridium difficile infection 11/01/2016    Diabetic ulcer of left forefoot associated with type 2 diabetes mellitus, with necrosis of bone ([address] 8/1/2019    Diabetic ulcer of right great toe associated with type 2 diabetes mellitus, with necrosis of bone ([address] 08/2019    Failed soft 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 toe amputation site 10/26/2020    History of hyperbaric oxygen therapy 10/28/2020    [facility] - compromised flap    Migraine     Possible perforated tympanic membrane     as a result of recurrent otitis    Post-op hematoma of left foot 11/12/2020    Recurrent otitis media     Tear of medial meniscus of left knee     Tobacco use     Toe osteomyelitis, left ([address] 7/24/2020       Past Surgical History:        Procedure Laterality Date    KNEE ARTHROSCOPY Left [id number]    LEFT KNEE ARTHROSCOPY , SYNOVECTOMY       OTHER SURGICAL HISTORY Left 07/24/2020    PARTIAL LEFT FOOT AMPUTATION    TOE AMPUTATION Right 8/23/2019    PARTIAL RIGHT FOOT AMPUTATION performed by Raine Feldman DPM at [address] [facility] Left 7/24/2020 PARTIAL LEFT FOOT AMPUTATION performed by Joe Jimenez DPM at [address] TOE AMPUTATION Left 10/22/2020    PARTIAL LEFT FOOT AMPUTATION WITH GRAFT APPLICATION performed by Joe Jimenez DPM at [address] [facility] EXTRACTION         Current Medications:     famotidine (PEPCID) injection  20 mg IntraVENous Daily    sodium chloride flush  5-40 mL IntraVENous 2 times per day    ceFAZolin 2000 mg in 20 mL SWFI IV Syringe  2,000 mg IntraVENous Q12H    hydrocortisone sodium succinate PF  50 mg IntraVENous Q6H    chlorhexidine  15 mL Mouth/Throat BID       Allergies:  Januvia [sitagliptin], Metformin and related, Vancomycin, and Mustard oil [allyl isothiocyanate]    Social History:    Social History     Tobacco Use    Smoking status: Former Smoker     Packs/day: 0.50     Years: 2.00     Pack years: 1.00     Quit date: 2005     Years since quittin.4    Smokeless tobacco: Former User     Quit date: 2005    Tobacco comment: SMOKED OCCASIONALLY UNTIL 8 YEARS AGO   Vaping Use    Vaping Use: Never used   Substance Use Topics    Alcohol use: Yes     Comment: RARELY    Drug use: No       Family History:       Problem Relation Age of Onset    Diabetes Mother     High Blood Pressure Mother     High Cholesterol Mother     Diabetes Father     High Blood Pressure Father     High Cholesterol Father     Stroke Father     High Blood Pressure Sister     High Blood Pressure Maternal Uncle     High Cholesterol Maternal Uncle     High Blood Pressure Maternal Grandmother        Review of Systems    Not obtained      Objective:   BP 87/65   Pulse 77   Temp 92.1 °F (33.4 °C) (Core)   Resp 24   Ht 6' 1\" (1.854 m)   Wt 295 lb 4.8 oz (133.9 kg)   SpO2 96%   BMI 38.96 kg/m²     Data:  CBC:   Recent Labs     22  1900 22  0200 22  0805   WBC 15.8* 17.4* 20.7*   HGB 11.8* 12.2* 12.3*   HCT 34.7* 34.9* 35.6*   MCV 92.9 90.5 90.9   * 135 137     BMP:   Recent Labs     01/24/22  1430 01/24/22  1430 01/24/22  1900 01/25/22  0000 01/25/22  0200 01/25/22  0400 01/25/22  0600   *  --  134*  --  133*  --   --    K 3.7   < > 3.7   < > 4.0 4.2 4.2   CL 97*  --  100  --  99  --   --    CO2 23  --  23  --  25  --   --    PHOS 2.8  --  2.7  --  1.4*  --   --    BUN 34*  --  31*  --  16  --   --    CREATININE 3.0*  --  2.8*  --  1.2  --   --     < > = values in this interval not displayed. LIVER PROFILE:   Recent Labs     01/23/22 2000 01/23/22 2000 01/24/22  0815 01/25/22  0000 01/25/22  0400   *   < > 235* 223* 269*   *   < > 149* 134* 161*   LIPASE 128.0*  --   --   --   --    BILIDIR 0.9*   < > 1.3* 1.2* 1.3*   BILITOT 1.5*   < > 1.8* 1.7* 1.8*   ALKPHOS 351*   < > 320* 299* 355*    < > = values in this interval not displayed. PT/INR:   Recent Labs     01/23/22 2000 01/23/22 2000 01/24/22  0815 01/24/22 1900 01/25/22  0000 01/25/22  0400   PROT 6.1*   < > 5.9*  --  5.9* 7.0   INR 1.42*  --  1.61* 1.61*  --   --     < > = values in this interval not displayed. HgBA1c:  Lab Results   Component Value Date    LABA1C 10.6 01/23/2022       Cultures: blood - MSSA    Wound - in progress    Imaging: xray left foot -   Remote surgical history of amputation at the 1st and 2nd digits. Margins of   the remaining 1st metatarsal are smooth with slight bony remodeling following   prior surgery. Margins of the remaining 2nd metatarsal are also smooth with   heterotopic bone and fusion of fragments at the base of the previously   remaining proximal phalanx. Subtle erosions are seen at the 3rd MTP joint   involving the base of the phalanx and the distal head of the 3rd metatarsal.   There is severe soft tissue swelling at the prior surgical site.    Questionable pattern of subcutaneous gas along the lateral aspect of the left   foot adjacent to the shaft of the 5th metatarsal.  There is severe   disorganization of bone at the tarsometatarsal joints of the 2nd through 5th   digits. Impression:  1. Remote history of amputation at the 1st and 2nd digits. No evidence of   osteomyelitis at prior resection site. 2. Subtle erosions at the 3rd MTP joint are new. This is adjacent to soft   tissue swelling at the prior amputation site. A new focus of osteomyelitis   is suspected. 3. Soft tissue swelling and questionable subcutaneous gas along the lateral   aspect of the left foot. There is also severe disorganization of bone at the   2nd through 5th tarsometatarsal joints. Although pattern may represent   Charcot arthropathy due to the more diffuse appearance, areas of   osteomyelitis cannot be excluded with plain film. Correlate with physical   exam and clinical workup. A follow-up MRI may be helpful for further   evaluation. Physical Exam:    DP/PT palpable bilateral  Right foot - no open lesions noted. No pressure lesions noted. Left foot - Ulcer on the dorsal midfoot which with compression reveals moderate serosanguinous drainage with small amount of purulence. No malodor noted. No crepitus noted. + edema of the foot. Mild erythema periwound. No increase in skin temperature noted. Probes close to bone (thin layer) or actually to bone which would be expected given location of the wound and infection. Plantarly the foot does not reveal any signs of infection. Prior amputation hallux and 2nd digit  Rocker bottom foot deformity noted left.          Assessment:  Patient Active Problem List   Diagnosis Code    Hypertension I10    Uncontrolled type 2 diabetes mellitus with diabetic polyneuropathy ([facility] [address] E11.42, E11.65    Cardiomyopathy ([facility] I42.9    Mixed hyperlipidemia E78.2    Hyperglycemia R73.9    Allergic rhinitis J30.9    Osteoarthritis M19.90    Acute renal failure (HCC) N17.9    MSSA bacteremia R78.81, B95.61    Third degree burn of left hand T23.302A    Syncope R55    Acute hypoxemic respiratory failure ([facility]) J96.01    Septic shock ([facility]) A41.9, R65.21    Cardiopulmonary arrest (HCC) I46.9    Abscess of left foot L02.616     Abscess left foot  diabetic foot ulcer left secondary to peripheral neuropathy   diabetes mellitus uncontrolled  Bacteremia (MSSA)  Charcot arthropathy left foot      Plan  Patient examined. Reviewed labs and imaging. Antibiotics as per Dr. Jackson Covarrubias. Discussed with him yesterday. Compression of the foot today revealed a small amount of purulence but large amount of serosanguinous drainage. He may require OR debridement but given clinical picture at this time I would favor local treatment. Since his last xrays in 2020 has developed Charcot arthropathy of the left foot. If more of a recent change which xrays would suggest then it is possible that this caused swelling in his foot and could have rubbed on his shoe creating a small wound which subsequently became infected. MRI of the foot would allow for evaluation of the abscess however would not give good evaluation of the bones of the midfoot as the Charcot changes and osteomyelitic changes would appear similar on MRI. There is a bony prominence/fragment on the dorsal midfoot which appears to be pretty close to the wound site which would increase the odds that the area rubbed on a shoe creating a small wound. Thus this could have been the original source of his infection. The gas noted on xray most likely related to the bedside I&D yesterday. No evidence of gas infection yesterday with Dr. Jackson Covarrubias or today during my evaluation. Applied dry dressing as wound will be reexamined later today by other providers. This will give it time to drain. Will follow. Thank you for allowing me to participate in the care of your patient.        Electronically signed by Blossom Romero DPM on 1/25/2022 at 8:27 AM.

## 2022-01-25 NOTE — PROGRESS NOTES
APTT (19:00) = 67.1. Therapeutic. Continue Heparin at  1000 units/hr. Will switch over to Heparin Anti-Xa monitoring. Heparin Anti-Xa  ordered for 1/25, 01:00.   STERLING BoydPh.1/24/20228:23 PM
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
## 2022-01-26 NOTE — PROGRESS NOTES
Patient is not able to demonstrate the ability to move from a reclining position to an upright position within the recliner due to Pt on vent CRRT and bedrest . Wiliam Sánchez RN

## 2022-01-26 NOTE — PROGRESS NOTES
01/25/22 1930   Vent Information   Vent Type 980   Vent Mode AC/VC   Vt Ordered 430 mL   Rate Set 24 bmp   Peak Flow 57 L/min   Pressure Support 0 cmH20   FiO2  40 %   SpO2 95 %   SpO2/FiO2 ratio 237.5   Sensitivity 3   PEEP/CPAP 5   I Time/ I Time % 0 s   Humidification Source Heated wire   Humidification Temp 37   Humidification Temp Measured 37   Circuit Condensation Drained   Vent Patient Data   High Peep/I Pressure 0   Peak Inspiratory Pressure 18 cmH2O   Mean Airway Pressure 10 cmH20   Rate Measured 24 br/min   Vt Exhaled 418 mL   Minute Volume 11.2 Liters   I:E Ratio 1:2.00   Plateau Pressure 16 DUS38   Static Compliance 38 mL/cmH2O   Dynamic Compliance 32 mL/cmH2O   Cough/Sputum   Sputum How Obtained Endotracheal;Suctioned   Cough Productive   Sputum Amount Small   Sputum Color Creamy   Tenacity Thick   Spontaneous Breathing Trial (SBT) RT Doc   Pulse 83   Breath Sounds   Right Upper Lobe Diminished   Right Middle Lobe Diminished   Right Lower Lobe Diminished   Left Upper Lobe Diminished   Left Lower Lobe Diminished   Additional Respiratory  Assessments   Resp 25   Position Semi-Springer's   Alarm Settings   High Pressure Alarm 40 cmH2O   Low Minute Volume Alarm 3 L/min   Apnea (secs) 20 secs   High Respiratory Rate 45 br/min   Low Exhaled Vt  300 mL   ETT (adult)   Placement Date: 01/23/22   Preoxygenation: Yes  Technique: Direct laryngoscopy  Type: Cuffed  Tube Size: 7.5 mm  Insertion attempts: 1  Secured at: 24 cm  Measured From: Lips   Secured at 24 cm   Measured From Lips   ET Placement Left   Secured By Commercial tube wheeler   Site Condition Dry

## 2022-01-26 NOTE — PROGRESS NOTES
Physician Progress Note      PATIENT:               Ha Gutierrez  CSN #:                  [id number]  :                       1975  ADMIT DATE:       2022 1:32 PM  [address] [place] DATE:  RESPONDING  PROVIDER #:        Crissy Bear MD          QUERY TEXT:    Pt admitted with RODRICK. Noted documentation of sepsis by Nephrology consultant. If possible, please document in progress notes and discharge summary:    The medical record reflects the following:  Risk Factors: foot abscess, MSSA bacteremia  Clinical Indicators: RODRICK, acute respiratory failure, WBC 18.6, lactic acid   3.8, vitals on : T 101.3, RR 18-26, HR , SBP , SPO2 %  Treatment: cultures, serial labs, ID consult, mechanical ventilation, IVF,   CRRT, Ancef, supportive care    Thank you,  Laci Sky RN, [email or web address]  Options provided:  -- Sepsis confirmed present on admission  -- Sepsis ruled out  -- Other - I will add my own diagnosis  -- Disagree - Not applicable / Not valid  -- Disagree - Clinically unable to determine / Unknown  -- Refer to Clinical Documentation Reviewer    PROVIDER RESPONSE TEXT:    The diagnosis of sepsis was confirmed as present on admission.     Query created by: Merritt Fuentes on 2022 2:26 PM      Electronically signed by:  Crissy Bear MD 2022 11:56 AM

## 2022-01-26 NOTE — PROGRESS NOTES
01/26/22 0305   Vent Information   $Ventilation $Subsequent Day   Vent Type 980   Vent Mode AC/VC   Vt Ordered 430 mL   Rate Set 24 bmp   Peak Flow 57 L/min   Pressure Support 0 cmH20   FiO2  40 %   SpO2 93 %   SpO2/FiO2 ratio 232.5   Sensitivity 3   PEEP/CPAP 5   I Time/ I Time % 0 s   Humidification Source Heated wire   Humidification Temp 37   Humidification Temp Measured 37   Circuit Condensation Drained   Vent Patient Data   High Peep/I Pressure 0   Peak Inspiratory Pressure 20 cmH2O   Mean Airway Pressure 11 cmH20   Rate Measured 26 br/min   Vt Exhaled 435 mL   Minute Volume 11.7 Liters   I:E Ratio 1:2.00   Plateau Pressure 15 QIG98   Cough/Sputum   Sputum How Obtained Endotracheal;Suctioned   Cough Productive   Sputum Amount Small   Sputum Color Creamy   Tenacity Thick   Spontaneous Breathing Trial (SBT) RT Doc   Pulse 82   Breath Sounds   Right Upper Lobe Diminished   Right Middle Lobe Diminished   Right Lower Lobe Diminished   Left Upper Lobe Diminished   Left Lower Lobe Diminished   Additional Respiratory  Assessments   Resp 17   Position Semi-Springer's   Alarm Settings   High Pressure Alarm 40 cmH2O   Low Minute Volume Alarm 3 L/min   High Respiratory Rate 45 br/min   ETT (adult)   Placement Date: 01/23/22   Preoxygenation: Yes  Technique: Direct laryngoscopy  Type: Cuffed  Tube Size: 7.5 mm  Insertion attempts: 1  Secured at: 24 cm  Measured From: Lips   Secured at 24 cm   Measured From Lips   ET Placement Right   Secured By Commercial tube wheeler   Site Condition Dry

## 2022-01-26 NOTE — PROGRESS NOTES
Pharmacy-Low dose Heparin Drip  Current heparin rate= 1000 units/hr (10 ml/hr)  Anti-Xa @ 0100 = 0.36 IU/ml  Goal 0.3-0.7 IU/ml  Per protocol, give a 2000 unit bolus and increase drip rate to 1200 units/hr (12 ml/hr).   Next Anti-xa due 1/26 @ 1500

## 2022-01-26 NOTE — PROGRESS NOTES
Progress Note    HISTORY     CC:  AMS          We are following for acute kidney injury       Subjective/   HPI:  Patient remains in the ICU in critical condition. On CRRT. Running even. Remains on vent and pressors. ROS:  Constitutional:  No fevers, hypothermic   Respiratory: On vent  :  Anuric     Social Hx:  No family at the bedside     Past Medical and Surgical History:  - Reviewed, no changes     EXAM       Objective/     Vitals:    01/26/22 0535 01/26/22 0600 01/26/22 0637 01/26/22 0700   BP: (!) 101/55 106/61 (!) 102/59 114/60   Pulse: 72 71 72 75   Resp: 26 24 24 24   Temp:    97.7 °F (36.5 °C)   TempSrc:       SpO2: 93% 94% 94% 94%   Weight:       Height:         24HR INTAKE/OUTPUT:      Intake/Output Summary (Last 24 hours) at 1/26/2022 2854  Last data filed at 1/26/2022 0700  Gross per 24 hour   Intake 1733.63 ml   Output 1862 ml   Net -128.37 ml     Constitutional:  Critically ill   Eyes:  Pupils reactive, sclera clear   Neck:  Normal thyroid, no masses   Cardiovascular:  Regular, no rub  Respiratory: On vent, no wheezing  Psychiatry:  Sedated on vent   Abdomen: +bs, soft, nt, no masses   Musculoskeletal: No LE edema, no clubbing   Lymphatics:  No LAD in neck, no supraclavicular nodes       MEDICAL DECISION MAKING       Data/  Recent Labs     01/25/22 1957 01/26/22  0204 01/26/22  0609   WBC 25.5* 30.2* 33.1*   HGB 12.6* 12.5* 12.2*   HCT 35.6* 36.6* 35.6*   MCV 90.4 91.3 91.2    165 168     Recent Labs     01/25/22  1400 01/25/22  1630 01/25/22 1957 01/25/22  2200 01/26/22  0204   *  --  131*  --  132*   K 4.2   < > 4.3 4.3 4.5   CL 96*  --  97*  --  97*   CO2 24  --  22  --  23   GLUCOSE 172*  --  185*  --  176*   PHOS 3.0  --  2.5  --  2.8   MG 2.50*  --  2.40  --  2.50*   BUN 21*  --  20  --  19   CREATININE 1.7*  --  1.7*  --  1.8*   LABGLOM 44*  --  44*  --  41*   GFRAA 53*  --  53*  --  50*    < > = values in this interval not displayed.        Assessment/ RODRICK likely related to prerenal factors in the setting of sepsis, use of diuretics and losartan. UA is not suggestive of glomerulonephritis.   Patient did not respond to IV fluid and developed acute pulmonary edema and renal replacement therapy initiated on 1/23/22     -Acute pulmonary edema              Status post intubation     -Acute hypoxic respiratory failure     -cardio respiratory arrest      -Sepsis with MSSA bacteremia     -Morbid obesity     -Diabetes, uncontrolled    Plan/     Continue CRRT  Running even  Could stop if filter goes down or this evening, then will follow labs but seems we could try IHD with pressor support given his metabolic demand is low     -----------------------------  Essence Noel M.D.   [facility]
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
## 2022-01-26 NOTE — PROGRESS NOTES
PROGRESS NOTE    Admit Date:  1/22/2022    Subjective:  42 y.o. male who is seen for evaluation of diabetic foot ulcer and abscess left foot. Seen in ICU sedated and on ventilator. History obtained from chart.            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    Chronic osteomyelitis of left foot ([address] 10/27/2020    Closed displaced fracture of distal phalanx of right little finger 4/8/2021    Clostridium difficile infection 11/01/2016    Diabetic ulcer of left forefoot associated with type 2 diabetes mellitus, with necrosis of bone ([address] 8/1/2019    Diabetic ulcer of right great toe associated with type 2 diabetes mellitus, with necrosis of bone ([address] 08/2019    Failed soft 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 toe amputation site 10/26/2020    History of hyperbaric oxygen therapy 10/28/2020    [facility]- [facility] 3 - compromised flap    Migraine     Possible perforated tympanic membrane     as a result of recurrent otitis    Post-op hematoma of left foot 11/12/2020    Recurrent otitis media     Tear of medial meniscus of left knee     Tobacco use     Toe osteomyelitis, left ([address] 7/24/2020       Past Surgical History:        Procedure Laterality Date    KNEE ARTHROSCOPY Left [id number]    LEFT KNEE ARTHROSCOPY , SYNOVECTOMY       OTHER SURGICAL HISTORY Left 07/24/2020    PARTIAL LEFT FOOT AMPUTATION    TOE AMPUTATION Right 8/23/2019    PARTIAL RIGHT FOOT AMPUTATION performed by Chas Mosley DPM at [facility] Left 7/24/2020    PARTIAL LEFT FOOT AMPUTATION performed by Chas Mosley DPM at [address] TOE AMPUTATION Left 10/22/2020    PARTIAL LEFT FOOT AMPUTATION WITH GRAFT APPLICATION performed by Chas Mosley DPM at [facility]         Current Medications:     mupirocin   Nasal BID    insulin glargine  30 Units SubCUTAneous BID    insulin lispro  0-18 Units SubCUTAneous Q4H    famotidine (PEPCID) injection  20 mg IntraVENous Daily    sodium chloride flush  5-40 mL IntraVENous 2 times per day    ceFAZolin 2000 mg in 20 mL SWFI IV Syringe  2,000 mg IntraVENous Q12H    hydrocortisone sodium succinate PF  50 mg IntraVENous Q6H    chlorhexidine  15 mL Mouth/Throat BID       Allergies:  Januvia [sitagliptin], Metformin and related, Vancomycin, and Mustard oil [allyl isothiocyanate]    Social History:    Social History     Tobacco Use    Smoking status: Former Smoker     Packs/day: 0.50     Years: 2.00     Pack years: 1.00     Quit date: 2005     Years since quittin.4    Smokeless tobacco: Former User     Quit date: 2005    Tobacco comment: SMOKED OCCASIONALLY UNTIL 8 YEARS AGO   Vaping Use    Vaping Use: Never used   Substance Use Topics    Alcohol use: Yes     Comment: RARELY    Drug use: No       Family History:       Problem Relation Age of Onset    Diabetes Mother     High Blood Pressure Mother     High Cholesterol Mother     Diabetes Father     High Blood Pressure Father     High Cholesterol Father     Stroke Father     High Blood Pressure Sister     High Blood Pressure Maternal Uncle     High Cholesterol Maternal Uncle     High Blood Pressure Maternal Grandmother        Review of Systems    Not obtained      Objective:   /65   Pulse 85   Temp 97.7 °F (36.5 °C)   Resp 24   Ht 6' 1\" (1.854 m)   Wt 295 lb 4.8 oz (133.9 kg)   SpO2 92%   BMI 38.96 kg/m²     Data:  CBC:   Recent Labs     22  0609   WBC 25.5* 30.2* 33.1*   HGB 12.6* 12.5* 12.2*   HCT 35.6* 36.6* 35.6*   MCV 90.4 91.3 91.2    165 168     BMP:   Recent Labs     22  0803   *  --   --  132* 131*   K 4.3   < > 4.3 4.5 4.4   CL 97*  --   --  97* 97*   CO2 22  --   --  23 25 PHOS 2.5  --   --  2.8 2.6   BUN 20  --   --  19 18   CREATININE 1.7*  --   --  1.8* 1.7*    < > = values in this interval not displayed. LIVER PROFILE:   Recent Labs     01/23/22  2000 01/24/22  0815 01/25/22  0400 01/25/22  0805 01/26/22  0609   *   < > 269* 206* 120*   *   < > 161* 119* 52*   LIPASE 128.0*  --   --   --   --    BILIDIR 0.9*   < > 1.3* 1.1* 0.7*   BILITOT 1.5*   < > 1.8* 1.6* 1.1*   ALKPHOS 351*   < > 355* 296* 311*    < > = values in this interval not displayed. PT/INR:   Recent Labs     01/24/22  1900 01/25/22  0400 01/25/22  0805 01/25/22 1957 01/26/22  0609 01/26/22  0803   PROT  --  7.0 5.9*  --  6.1*  --    INR   < >  --  1.52* 1.38*  --  1.35*    < > = values in this interval not displayed. HgBA1c:  Lab Results   Component Value Date    LABA1C 10.6 01/23/2022       Cultures: blood - MSSA    Wound - MSSA    Imaging: xray left foot -   Remote surgical history of amputation at the 1st and 2nd digits. Margins of   the remaining 1st metatarsal are smooth with slight bony remodeling following   prior surgery. Margins of the remaining 2nd metatarsal are also smooth with   heterotopic bone and fusion of fragments at the base of the previously   remaining proximal phalanx. Subtle erosions are seen at the 3rd MTP joint   involving the base of the phalanx and the distal head of the 3rd metatarsal.   There is severe soft tissue swelling at the prior surgical site. Questionable pattern of subcutaneous gas along the lateral aspect of the left   foot adjacent to the shaft of the 5th metatarsal.  There is severe   disorganization of bone at the tarsometatarsal joints of the 2nd through 5th   digits. Impression:  1. Remote history of amputation at the 1st and 2nd digits. No evidence of   osteomyelitis at prior resection site. 2. Subtle erosions at the 3rd MTP joint are new. This is adjacent to soft   tissue swelling at the prior amputation site.   A new focus of osteomyelitis   is suspected. 3. Soft tissue swelling and questionable subcutaneous gas along the lateral   aspect of the left foot. There is also severe disorganization of bone at the   2nd through 5th tarsometatarsal joints. Although pattern may represent   Charcot arthropathy due to the more diffuse appearance, areas of   osteomyelitis cannot be excluded with plain film. Correlate with physical   exam and clinical workup. A follow-up MRI may be helpful for further   evaluation. Physical Exam:    DP/PT palpable bilateral  Right foot - no open lesions noted. No pressure lesions noted. Left foot - Ulcer on the dorsal midfoot which with compression reveals serosanguinous drainage mixed with purulence. No malodor noted. No crepitus noted. + edema of the foot. Mild erythema periwound. No increase in skin temperature noted. Probes to bone of metatarsal.   Plantarly the foot does not reveal any signs of infection. Prior amputation hallux and 2nd digit  Rocker bottom foot deformity noted left. Assessment:  Patient Active Problem List   Diagnosis Code    Hypertension I10    Uncontrolled type 2 diabetes mellitus with diabetic polyneuropathy ([address] E11.42, E11.65    Cardiomyopathy [facility] I42.9    Mixed hyperlipidemia E78.2    Hyperglycemia R73.9    Allergic rhinitis J30.9    Osteoarthritis M19.90    Acute renal failure (HCC) N17.9    MSSA bacteremia R78.81, B95.61    Third degree burn of left hand T23.302A    Syncope R55    Acute hypoxemic respiratory failure (HCC) J96.01    Septic shock (HCC) A41.9, R65.21    Cardiopulmonary arrest (HCC) I46.9    Abscess of left foot L02.612     Abscess left foot  diabetic foot ulcer left secondary to peripheral neuropathy   diabetes mellitus uncontrolled  Bacteremia (MSSA)  Charcot arthropathy left foot      Plan  Patient examined. Reviewed labs and imaging. Antibiotics as per Dr. Jackson Covarrubias. Seen in conjunction with him today.    Compression of the forefoot to drain abscess. Packed foot with iodoform and dry dressing. When more stable may require further imaging with MRI. Given Charcot arthropathy imaging may not be able to confirm or deny osteomyelitis of the midfoot. MRI will allow for better evaluation of soft tissues. I would expect him to require long term treatment for osteomyelitis given purulence has been sitting around the midfoot bones. Will follow.          Electronically signed by Liseth Booker DPM on 1/26/2022 at 12:52 PM.

## 2022-01-26 NOTE — PROGRESS NOTES
Pharmacy-Low dose Heparin Drip  Current heparin rate= 1200 units/hr (12 ml/hr)  Anti-Xa @ 1420 = 0.32 IU/ml  Goal 0.3-0.7 IU/ml  Per protocol, no bolus and continue current rate of 1200 units/hr (12 ml/hr).   Next Anti-xa due 1/26 @ 9104

## 2022-01-26 NOTE — PROGRESS NOTES
Shift change, bedside report given to [facility]. Care has been transferred at this time.

## 2022-01-26 NOTE — PROGRESS NOTES
Initial exam completed- See doc flowsheet for assessment findings. Pt resting quietly in bed and does not open eyes to stimulation or follow commands. Minimal withdraw to pain in ext. All lines and monitoring devices are in place . Vitals and SpO2 stable. Call light is within easy reach. Plan of care and goals reviewed. CRRT running well and on target to keep fluid balance even. Dr Shekhar Feliciano rounding and updated.   Bird Segundo RN

## 2022-01-26 NOTE — PROGRESS NOTES
01/25/22 2312   Vent Information   Vent Type 980   Vent Mode AC/VC   Vt Ordered 430 mL   Rate Set 24 bmp   Peak Flow 57 L/min   Pressure Support 0 cmH20   FiO2  40 %   SpO2 96 %   SpO2/FiO2 ratio 240   Sensitivity 3   PEEP/CPAP 5   I Time/ I Time % 0 s   Humidification Source Heated wire   Humidification Temp 37   Humidification Temp Measured 36.9   Circuit Condensation Drained   Vent Patient Data   High Peep/I Pressure 0   Peak Inspiratory Pressure 21 cmH2O   Mean Airway Pressure 11 cmH20   Rate Measured 25 br/min   Vt Exhaled 482 mL   Minute Volume 11.1 Liters   I:E Ratio 1:2.00   Plateau Pressure 15 WKV19   Cough/Sputum   Sputum How Obtained Endotracheal;Suctioned   Cough Productive   Sputum Amount Small   Sputum Color Creamy   Tenacity Thick   Spontaneous Breathing Trial (SBT) RT Doc   Pulse 81   Breath Sounds   Right Upper Lobe Diminished   Right Middle Lobe Diminished   Right Lower Lobe Diminished   Left Upper Lobe Diminished   Left Lower Lobe Diminished   Additional Respiratory  Assessments   Resp 22   Position Semi-Springer's   Alarm Settings   High Pressure Alarm 40 cmH2O   Low Minute Volume Alarm 3 L/min   Apnea (secs) 20 secs   High Respiratory Rate 45 br/min   Low Exhaled Vt  300 mL   ETT (adult)   Placement Date: 01/23/22   Preoxygenation: Yes  Technique: Direct laryngoscopy  Type: Cuffed  Tube Size: 7.5 mm  Insertion attempts: 1  Secured at: 24 cm  Measured From: Lips   Secured at 24 cm   Measured From Lips   ET Placement Left   Secured By Commercial tube wheeler   Site Condition Dry

## 2022-01-26 NOTE — PROGRESS NOTES
Pt has failed SBT with RR up to 38 SpO2 decreases to low 80's and required O2 increase to 80%. He is awake restless but does not follow commands to squeeze my hand or blink his eyes.  Sedatives resumed see MAR and will order versed gtt per VO Dr Smith Son RN

## 2022-01-26 NOTE — PROGRESS NOTES
Hospitalist Progress Note      PCP: Bo Camarena MD    Date of Admission: 1/22/2022    Subjective: on CRRT, still on levophed    Medications:  Reviewed    Infusion Medications    heparin 25,000 units in dextrose 5% 250 mL infusion 1,000 Units/hr (01/25/22 1916)    sodium chloride Stopped (01/23/22 1319)    dextrose      prismaSATE BGK 4/2.5 2,000 mL/hr at 01/25/22 1925    prismaSATE BGK 4/2.5 1,000 mL/hr at 01/25/22 1925    prismaSATE BGK 4/2.5 1,000 mL/hr at 01/25/22 1925    propofol 20 mcg/kg/min (01/25/22 1921)    fentaNYL 100 mcg/hr (01/25/22 1916)    norepinephrine 12 mcg/min (01/25/22 1916)    insulin 4.9 Units/hr (01/25/22 2108)     Scheduled Medications    famotidine (PEPCID) injection  20 mg IntraVENous Daily    sodium chloride flush  5-40 mL IntraVENous 2 times per day    ceFAZolin 2000 mg in 20 mL SWFI IV Syringe  2,000 mg IntraVENous Q12H    hydrocortisone sodium succinate PF  50 mg IntraVENous Q6H    chlorhexidine  15 mL Mouth/Throat BID     PRN Meds: heparin (porcine), heparin (porcine), sodium chloride flush, sodium chloride, acetaminophen **OR** acetaminophen, glucose, glucagon (rDNA), dextrose, dextrose bolus (hypoglycemia) **OR** dextrose bolus (hypoglycemia), magnesium sulfate, calcium gluconate **OR** calcium gluconate **OR** calcium gluconate **OR** calcium gluconate, sodium phosphate IVPB **OR** sodium phosphate IVPB **OR** sodium phosphate IVPB **OR** sodium phosphate IVPB      Intake/Output Summary (Last 24 hours) at 1/25/2022 2135  Last data filed at 1/25/2022 2100  Gross per 24 hour   Intake 2240.64 ml   Output 2481 ml   Net -240.36 ml       Physical Exam Performed:    BP (!) 99/57   Pulse 84   Temp 97.9 °F (36.6 °C)   Resp 24   Ht 6' 1\" (1.854 m)   Wt 295 lb 4.8 oz (133.9 kg)   SpO2 96%   BMI 38.96 kg/m²       Gen: intubated  Eyes: PERRL. No sclera icterus. No conjunctival injection. ENT: No discharge. Pharynx clear. Neck: Trachea midline.  Normal thyroid. Resp:+ accessory muscle use. + crackles. No wheezes. No rhonchi. CV: INCREASED rate. Regular rhythm. No murmur or rub. No edema. GI: Non-tender. Non-distended. No masses. No organomegaly. Normal bowel sounds. No hernia. Skin: burns left hand with some yellow slough. Chronic skin changes both legs.   Lymph: No cervical LAD. No supraclavicular LAD. M/S: No cyanosis. No joint deformity. No clubbing. Missing right big toe  Neuro: intubated, cannot be assessed  Psych: intubated, cannot be assessed    Labs:   Recent Labs     01/25/22  0805 01/25/22  1400 01/25/22 1957   WBC 20.7* 23.0* 25.5*   HGB 12.3* 12.5* 12.6*   HCT 35.6* 36.4* 35.6*    140 163     Recent Labs     01/25/22  0805 01/25/22  1015 01/25/22  1400 01/25/22  1400 01/25/22  1630 01/25/22  1815 01/25/22 1957   *  --  131*  --   --   --  131*   K 4.1   < > 4.2   < > 4.2 4.3 4.3   CL 97*  --  96*  --   --   --  97*   CO2 22  --  24  --   --   --  22   BUN 23*  --  21*  --   --   --  20   CREATININE 2.0*  --  1.7*  --   --   --  1.7*   CALCIUM 8.5  --  8.6  --   --   --  8.1*   PHOS 3.5  --  3.0  --   --   --  2.5    < > = values in this interval not displayed. Recent Labs     01/25/22  0000 01/25/22  0400 01/25/22  0805   * 269* 206*   * 161* 119*   BILIDIR 1.2* 1.3* 1.1*   BILITOT 1.7* 1.8* 1.6*   ALKPHOS 299* 355* 296*     Recent Labs     01/24/22  1900 01/25/22  0805 01/25/22 1957   INR 1.61* 1.52* 1.38*     Recent Labs     01/24/22  0200 01/24/22  0815 01/24/22  1900 01/25/22  0200 01/25/22  0805   CKTOTAL 4,512*  --   --   --  1,549*   TROPONINI <0.01   < > <0.01 <0.01 <0.01    < > = values in this interval not displayed.        Urinalysis:      Lab Results   Component Value Date    NITRU Negative 01/22/2022    WBCUA 0-2 01/22/2022    BACTERIA Rare 01/22/2022    RBCUA 0-2 01/22/2022    BLOODU TRACE-LYSED 01/22/2022    SPECGRAV >=1.030 01/22/2022    GLUCOSEU 500 01/22/2022       Radiology:  XR CHEST PORTABLE   Final Result   Low lung volumes/poor inspiratory effort limiting the study. No significant improvement. A mild cardiomegaly. Mild congestion and/or   infiltrates identified in the lungs. No pneumothorax. XR FOOT LEFT (2 VIEWS)   Final Result   1. Remote history of amputation at the 1st and 2nd digits. No evidence of   osteomyelitis at prior resection site. 2. Subtle erosions at the 3rd MTP joint are new. This is adjacent to soft   tissue swelling at the prior amputation site. A new focus of osteomyelitis   is suspected. 3. Soft tissue swelling and questionable subcutaneous gas along the lateral   aspect of the left foot. There is also severe disorganization of bone at the   2nd through 5th tarsometatarsal joints. Although pattern may represent   Charcot arthropathy due to the more diffuse appearance, areas of   osteomyelitis cannot be excluded with plain film. Correlate with physical   exam and clinical workup. A follow-up MRI may be helpful for further   evaluation. XR CHEST PORTABLE   Final Result   Low lung volumes with cardiomegaly and vascular congestion, as well as patchy   airspace disease bilaterally, similar to the previous exam.         XR CHEST PORTABLE   Final Result   Status post advancement of right internal jugular central line with distal   tip now visualized near region of junction of superior vena cava and right   atrium. No evidence of pneumothorax. XR CHEST PORTABLE   Final Result   Improved lung volumes. Bilateral perihilar opacification, edema versus   infiltrate. Satisfactory position of endotracheal tube. Central line tip in either the   distal brachiocephalic vein or proximal SVC. XR CHEST PORTABLE   Final Result   Cardiomegaly with left mid and lower lung infiltrates. Support tubes as described above. XR CHEST 1 VIEW   Final Result   Limited chest as outlined above.   Bilateral perihilar opacification, edema versus infiltrate. XR CHEST PORTABLE   Final Result   Low lung volumes. No acute cardiopulmonary disease. XR CHEST PORTABLE    (Results Pending)           Assessment/Plan:    Active Hospital Problems    Diagnosis     Abscess of left foot [L02.612]     MSSA bacteremia [R78.81, B95.61]     Third degree burn of left hand [T23.302A]     Syncope [R55]     Acute hypoxemic respiratory failure ([address] [J96.01]     Septic shock (HCC) [A41.9, R65.21]     Cardiopulmonary arrest ([address] [I46.9]     Acute renal failure ([address] [N17.9]     Hyperglycemia [R73.9]     Mixed hyperlipidemia [E78.2]     Cardiomyopathy ([address] [I42.9]     Uncontrolled type 2 diabetes mellitus with diabetic polyneuropathy ([address] [E11.42, E11.65]            #RODRICK.  Patient admitted to hospital with RODRICK.  Normal renal function October 2021. Penny Alamo is suspected to be prerenal/ATN.  Creatinine worsened.  Nephrology consulted. Susie Jenkins was started on IV fluids  Emergent Vas-Cath placed and CRRT started.  Critical care consulted     #Acute respiratory failure. Suspect patient developed acute pulmonary edema causing acute respiratory failure from renal failure. Intubated 1/23/22.  Pulmonary consulted     #H/o cardiomyopathy - check echo with EF 35%, cardio consulted     #S/p PEA arrest 1/23/22     # Left foot abscess - s/p I&D, ID and podiatry consulted, cx sent     #MSSA bacteremia.  Etiology unclear but suspect from LE.  Started Ancef.  ID consultation. [facility] reviewed     #Diabetes mellitus type 2 uncontrolled.  Insulin.  Continue sliding scale.  Monitor sugars.     #Hypertension.  Blood pressure soft.  Hold blood pressure medications.              Heparin for DVT prophylaxis.   Diet: Diet NPO  Code Status: Full Code    PT/OT Eval Status: ordered    Jimy Cooper MD

## 2022-01-26 NOTE — PROGRESS NOTES
[facility] Infectious Disease Progress Note      Magui Everett     : 1975    DATE OF VISIT:  2022  DATE OF ADMISSION:  2022       Subjective:     Magui Everett is a 42 y.o. male whom I've been seeing for an MSSA foot abscess with bacteremia, septic shock, multiorgan failure. Since I last saw him, maybe a few small steps in the right direction - warmed back up to usual body temperature; awake and active enough overnight that his sedation had to be increased a bit; FIO2 down to 30. Norepinephrine up a bit to 12 mcg, but some BP room now to start backing that down again. Little in the way of ETT secretions, no diarrhea. Still on CRRT, almost no urine output. Mr. 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     Current Facility-Administered Medications: famotidine (PEPCID) injection 20 mg, 20 mg, IntraVENous, Daily  heparin (porcine) injection 4,000 Units, 4,000 Units, IntraVENous, PRN  heparin (porcine) injection 2,000 Units, 2,000 Units, IntraVENous, PRN  heparin (porcine) 25,000 Units in dextrose 5 % 250 mL infusion, 1,200 Units/hr, IntraVENous, Continuous  sodium chloride flush 0.9 % injection 5-40 mL, 5-40 mL, IntraVENous, 2 times per day  sodium chloride flush 0.9 % injection 5-40 mL, 5-40 mL, IntraVENous, PRN  0.9 % sodium chloride infusion, 25 mL, IntraVENous, PRN  acetaminophen (TYLENOL) tablet 650 mg, 650 mg, Oral, Q6H PRN **OR** acetaminophen (TYLENOL) suppository 650 mg, 650 mg, Rectal, Q6H PRN  glucose (GLUTOSE) 40 % oral gel 15 g, 15 g, Oral, PRN  glucagon (rDNA) injection 1 mg, 1 mg, IntraMUSCular, PRN  dextrose 5 % solution, 100 mL/hr, IntraVENous, PRN  dextrose bolus (hypoglycemia) 10% 125 mL, 125 mL, IntraVENous, PRN **OR** dextrose bolus (hypoglycemia) 10% 250 mL, 250 mL, IntraVENous, PRN  prismaSATE BGK 4/2.5 dialysis solution, , Dialysis, Continuous  prismaSATE BGK 4/2.5 dialysis solution, , Dialysis, Continuous  prismaSATE BGK 4/2.5 dialysis solution, , Dialysis, Continuous  magnesium sulfate 1000 mg in dextrose 5% 100 mL IVPB, 1,000 mg, IntraVENous, PRN  calcium gluconate 1,000 mg in dextrose 5 % 100 mL IVPB, 1,000 mg, IntraVENous, PRN **OR** calcium gluconate 2,000 mg in dextrose 5 % 100 mL IVPB, 2,000 mg, IntraVENous, PRN **OR** calcium gluconate 3,000 mg in dextrose 5 % 100 mL IVPB, 3,000 mg, IntraVENous, PRN **OR** calcium gluconate 4,000 mg in dextrose 5 % 100 mL IVPB, 4,000 mg, IntraVENous, PRN  sodium phosphate 6 mmol in dextrose 5 % 250 mL IVPB, 6 mmol, IntraVENous, PRN **OR** sodium phosphate 12 mmol in dextrose 5 % 250 mL IVPB, 12 mmol, IntraVENous, PRN **OR** sodium phosphate 18 mmol in dextrose 5 % 500 mL IVPB, 18 mmol, IntraVENous, PRN **OR** sodium phosphate 24 mmol in dextrose 5 % 500 mL IVPB, 24 mmol, IntraVENous, PRN  propofol injection, 5-50 mcg/kg/min, IntraVENous, Titrated  fentaNYL (SUBLIMAZE) 1,000 mcg in sodium chloride 0.9% 100 mL infusion, 12.5-200 mcg/hr, IntraVENous, Continuous  norepinephrine (LEVOPHED) 16 mg in dextrose 5 % 250 mL infusion, 2-100 mcg/min, IntraVENous, Continuous  ceFAZolin (ANCEF) 2000 mg in sterile water 20 mL IV syringe, 2,000 mg, IntraVENous, Q12H  hydrocortisone sodium succinate PF (SOLU-CORTEF) injection 50 mg, 50 mg, IntraVENous, Q6H  insulin regular (HUMULIN R;NOVOLIN R) 100 Units in sodium chloride 0.9 % 100 mL infusion, 0.5 Units/hr, IntraVENous, Continuous  chlorhexidine (PERIDEX) 0.12 % solution 15 mL, 15 mL, Mouth/Throat, BID     This is day 4 of Ancef, still on stress-dose steroids as well. Allergies: Januvia [sitagliptin], Metformin and related, Vancomycin, and Mustard oil [allyl isothiocyanate]    Pertinent items from the review of systems are discussed in the HPI; the remainder of the ROS was reviewed and is negative. Objective:     Vital signs over the last 24 hours:  Temp  Av.6 °F (35.3 °C)  Min: 91.9 °F (33.3 °C)  Max: 98.6 °F (37 °C)  Pulse  Av.9  Min: 61  Max: 88  Systolic (86QHG), EBJ:80 , Min:73 , TMN:157   Diastolic (50MIE), WQN:33, Min:46, Max:76  Resp  Av.5  Min: 14  Max: 26  SpO2  Av.5 %  Min: 84 %  Max: 97 %    Constitutional:  well-developed, well-nourished, sedated, unresponsive, on the vent  Psychiatric:  Unable to assess   Eyes:  pupils equal, round, not as tiny and also more reactive to light today; sclerae anicteric, conjunctivae not pale, no subconj bleed  ENT:  atraumatic; oral mucosa moist, no thrush or ulcers; orally intubated  Resp:  lungs a bit coarse to auscultation BL, decreased at the bases  Cardiovascular:  heart regular, tachy, decreased heart sounds, no murmur able to be heard; generally mild extremity edema, mild-mod LLE; no IV phlebitis; right IJ CVC in place, and a right femoral CRRT line, a couple of peripherals  GI:  abdomen soft, NT, mildly distended, hypoactive bowel sounds, no palpable masses or organomegaly  Musculoskeletal:  no clubbing, cyanosis or petechiae; extremities with no gross effusions, joint misalignment or acute arthritis  Skin: warm, dry, normal turgor; no acute rash, no peripheral stigmata of endocarditis. Right lower leg changes chronic and stable; left hand not examined today.  Left foot still swollen, warm, not quite as much reactive erythema. I did not change the dressing this AM.  ______________________________    Recent Labs     01/26/22  0609 01/26/22  0204 01/25/22 1957   WBC 33.1* 30.2* 25.5*   HGB 12.2* 12.5* 12.6*   HCT 35.6* 36.6* 35.6*   MCV 91.2 91.3 90.4    165 163     Lab Results   Component Value Date    CREATININE 1.8 (H) 01/26/2022     Lab Results   Component Value Date    LABALBU 2.3 (L) 01/26/2022     Lab Results   Component Value Date    ALT 52 (H) 01/26/2022     (H) 01/26/2022    ALKPHOS 311 (H) 01/26/2022    BILITOT 1.1 (H) 01/26/2022      Lab Results   Component Value Date    LABA1C 10.6 01/23/2022     Other recent pertinent labs: Anion gap stable at 12. Glucoses all in the 100s. CK down to 1549 yesterday. Alk phos is up a bit, but transaminases and bili decreasing. WBC diff with 12% immature neutrophils.   ______________________________    Recent pertinent micro results:  Jan 22 BCx (+) 2 of 2 MSSA. RCx -- MSSA. Left foot abscess Cx -- MSSA. Jan 23 BCx (-) x 2, greater than 48 hours. ______________________________    Recent imaging results (last 7 days):     XR FOOT LEFT (2 VIEWS)    Result Date: 1/24/2022  1. Remote history of amputation at the 1st and 2nd digits. No evidence of osteomyelitis at prior resection site. 2. Subtle erosions at the 3rd MTP joint are new. This is adjacent to soft tissue swelling at the prior amputation site. A new focus of osteomyelitis is suspected. 3. Soft tissue swelling and questionable subcutaneous gas along the lateral aspect of the left foot. There is also severe disorganization of bone at the 2nd through 5th tarsometatarsal joints. Although pattern may represent Charcot arthropathy due to the more diffuse appearance, areas of osteomyelitis cannot be excluded with plain film. Correlate with physical exam and clinical workup. A follow-up MRI may be helpful for further evaluation.      XR CHEST PORTABLE    Result Date: 1/25/2022  Low lung volumes/poor inspiratory effort limiting the study. No significant improvement. A mild cardiomegaly. Mild congestion and/or infiltrates identified in the lungs. No pneumothorax. XR CHEST PORTABLE    Result Date: 1/25/2022  Status post advancement of right internal jugular central line with distal tip now visualized near region of junction of superior vena cava and right atrium. No evidence of pneumothorax. XR CHEST PORTABLE    Result Date: 1/24/2022  Low lung volumes with cardiomegaly and vascular congestion, as well as patchy airspace disease bilaterally, similar to the previous exam.     XR CHEST PORTABLE    Result Date: 1/23/2022  Improved lung volumes. Bilateral perihilar opacification, edema versus infiltrate. Satisfactory position of endotracheal tube. Central line tip in either the distal brachiocephalic vein or proximal SVC. XR CHEST PORTABLE    Result Date: 1/23/2022  Cardiomegaly with left mid and lower lung infiltrates. Support tubes as described above. XR CHEST PORTABLE    Result Date: 1/22/2022  Low lung volumes. No acute cardiopulmonary disease. XR CHEST 1 VIEW    Result Date: 1/23/2022  Limited chest as outlined above. Bilateral perihilar opacification, edema versus infiltrate.   ______________________    Today's XR still pending.       Assessment:     Patient Active Problem List   Diagnosis Code    Hypertension I10    Uncontrolled type 2 diabetes mellitus with diabetic polyneuropathy ([facility] E11.42, E11.65    Cardiomyopathy ([facility] I42.9    Mixed hyperlipidemia E78.2    Hyperglycemia R73.9    Allergic rhinitis J30.9    Osteoarthritis M19.90    Acute renal failure (HCC) N17.9    MSSA bacteremia R78.81, B95.61    Third degree burn of left hand T23.302A    Syncope R55    Acute hypoxemic respiratory failure (HCC) J96.01    Septic shock (HCC) A41.9, R65.21    Cardiopulmonary arrest (HCC) I46.9    Abscess of left foot L02.612     Assessment of today's active condition(s):      --          Background of uncontrolled DM2, neuropathy, no known PAD, multiple prior diabetic foot ulcers, infections, surgeries. Most recent wounds were at the left 1st and 2nd ray, but they had been healed for just about a year.      --          Admission this time with a fairly nonfocal sepsis syndrome, at least in terms of symptoms, complicated by shock, acute renal failure, lactic acidosis, then cardiac arrest, resuscitation, acute respiratory failure, rapid Afib. Currently with vent support, sedation, vasopressors, insulin drip, and CRRT. Found to have MSSA bacteremia, and a sizeable MSSA foot abscess. Based on how far I'm able to probe, I do have a fairly high suspicion of active osteomyelitis in the foot, though those XR changes are very hard to determine osteo from Charcot changes alone.     --          Systemically, things at least seem stable or perhaps slightly improved over the last 24 hours, at least in terms of FIO2 and neurologic activity.      --          Third degree burn of left hand, but no strong signs of infection there, at least nothing deep. An acute Staph aureus endocarditis could still be a possibility, but the foot definitely seems the source of the initial bacteremia, and that's not shown to be sustained (so far). TTE does not have great sensitivity for endocarditis, but my clinical suspicion isn't too high. -- Multifactorial leukocytosis with impressive left shift -- I think from the abscess (perhaps still not totally drained), the bacteremia, the stress of acute shock and organ failure, and the effect of steroids. CK and LFTs improving, which is encouraging too. Treatment recs:     Continue Ancef at current dose.  When he's eventually off CRRT, we'll need to adjust that.     Await final report on repeat BCx.     Can hold off on JOHN for now -- D/W Dr. Ned Hooks on Monday.      I'll come back up later today to re-probe and re-dress that left foot. Eventually needs better imaging, probably OR plans, once more stable (as long as we at least can get the abscess drained with bedside efforts, to control his sepsis).      Simple local care for the left hand (Silvadene would be fine, Triad dressing, Santyl, honey, etc -- anything to encourage a bit of debridement and keep it moist). Electronically signed by Zhao Patton MD on 1/26/2022 at 7:59 AM.  _________________    ADDENDUM --    Patient seen again, this time with Dr. Dean Hogna. Left foot dressing removed, and on manual pressure a moderate to large amount of old bloody and purulent drainage was expressed. It looked as though the blood was coming from all portions of the midfoot, but the pus did seem perhaps more distal. I was definitely able to palpate some metatarsal (3rd or 4th?) bone with a cotton-tipped swab today. Wound then re-packed with 1/4\" iodoform, dry dressing reapplied, will assess the foot again in the AM. For right now, I think the best we can do is daily manual expression of old blood and purulence, and repack the wound. When he's more stable, plan for MRI and then discussions of surgery. Also looked at the left hand. A bit of that eschar is starting to lyse, but there's some more maceration today, especially toward the 4th and 5th fingers. Entered an order for daily dressing changes there (cleanse, pat dry, Triad dressing to maceration, necrosis and eschar, then gauze and Kerlix), D/W his ICU RN. Pressors down just a bit from this AM (12 to 10 mcg norepi).      I'll see him again in the AM.    Electronically signed by Zhao Patton MD on 1/26/2022 at 12:58 PM

## 2022-01-26 NOTE — PROGRESS NOTES
CRRT stopped at 1805 and blood returned using strict sterile technique. Pt tolerated well Vitals stable and levophed gtt continues to be weaned and currently at 5 mcg. Pt continues to rest quietly and no further changes noted in exam. All lines and monitoring devices remain in place. Pt repositioned in bed. Continue current plan of care.  Marina De La Fuente RN

## 2022-01-26 NOTE — PROGRESS NOTES
Hospitalist Progress Note      PCP: Fortunato Moy MD    Date of Admission: 1/22/2022     Admitted with acute hypoxic respiratory failure, septic shock and MSSA bacteremia  Left foot abscess drained 1/24/2022  Cardiopulmonary arrest x2 on 1/23/2022, required CPR  Went with acute kidney injury currently on CRRT    Subjective: on CRRT, still on levophed  Off epinephrine  On insulin drip    Medications:  Reviewed    Infusion Medications    midazolam 3 mg/hr (01/26/22 1327)    heparin 25,000 units in dextrose 5% 250 mL infusion 1,200 Units/hr (01/26/22 1300)    sodium chloride Stopped (01/23/22 1319)    dextrose      prismaSATE BGK 4/2.5 2,000 mL/hr at 01/26/22 0545    prismaSATE BGK 4/2.5 1,000 mL/hr at 01/26/22 0545    prismaSATE BGK 4/2.5 1,000 mL/hr at 01/26/22 0545    fentaNYL 125 mcg/hr (01/26/22 1307)    norepinephrine 9 mcg/min (01/26/22 1335)     Scheduled Medications    mupirocin   Nasal BID    insulin glargine  30 Units SubCUTAneous BID    insulin lispro  0-18 Units SubCUTAneous Q4H    famotidine (PEPCID) injection  20 mg IntraVENous Daily    sodium chloride flush  5-40 mL IntraVENous 2 times per day    ceFAZolin 2000 mg in 20 mL SWFI IV Syringe  2,000 mg IntraVENous Q12H    hydrocortisone sodium succinate PF  50 mg IntraVENous Q6H    chlorhexidine  15 mL Mouth/Throat BID     PRN Meds: midazolam, heparin (porcine), heparin (porcine), sodium chloride flush, sodium chloride, acetaminophen **OR** acetaminophen, glucose, glucagon (rDNA), dextrose, dextrose bolus (hypoglycemia) **OR** dextrose bolus (hypoglycemia), magnesium sulfate, calcium gluconate **OR** calcium gluconate **OR** calcium gluconate **OR** calcium gluconate, sodium phosphate IVPB **OR** sodium phosphate IVPB **OR** sodium phosphate IVPB **OR** sodium phosphate IVPB      Intake/Output Summary (Last 24 hours) at 1/26/2022 1350  Last data filed at 1/26/2022 1300  Gross per 24 hour   Intake 1782.7 ml   Output 1768 ml Net 14.7 ml       Physical Exam Performed:    /67   Pulse 88   Temp 98.1 °F (36.7 °C) (Bladder)   Resp 29   Ht 6' 1\" (1.854 m)   Wt 295 lb 4.8 oz (133.9 kg)   SpO2 93%   BMI 38.96 kg/m²       Gen: intubated  Eyes: PERRL. No sclera icterus. No conjunctival injection. ENT: No discharge. Pharynx clear. Neck: Trachea midline. Normal thyroid. Resp:+ accessory muscle use. + crackles. No wheezes. No rhonchi. CV: INCREASED rate. Regular rhythm. No murmur or rub. No edema. GI: Non-tender. Non-distended. No masses. No organomegaly. Normal bowel sounds. No hernia. Skin: burns left hand with some yellow slough. Chronic skin changes both legs.   Lymph: No cervical LAD. No supraclavicular LAD. M/S: No cyanosis. No joint deformity. No clubbing. Missing right big toe  Neuro: intubated, cannot be assessed  Psych: intubated, cannot be assessed    Labs:   Recent Labs     01/25/22 1957 01/26/22 0204 01/26/22  0609   WBC 25.5* 30.2* 33.1*   HGB 12.6* 12.5* 12.2*   HCT 35.6* 36.6* 35.6*    165 168     Recent Labs     01/25/22 1957 01/25/22 1957 01/25/22  2200 01/26/22  0204 01/26/22  0803   *  --   --  132* 131*   K 4.3   < > 4.3 4.5 4.4   CL 97*  --   --  97* 97*   CO2 22  --   --  23 25   BUN 20  --   --  19 18   CREATININE 1.7*  --   --  1.8* 1.7*   CALCIUM 8.1*  --   --  8.5 7.8*   PHOS 2.5  --   --  2.8 2.6    < > = values in this interval not displayed. Recent Labs     01/25/22  0400 01/25/22  0805 01/26/22  0609   * 206* 120*   * 119* 52*   BILIDIR 1.3* 1.1* 0.7*   BILITOT 1.8* 1.6* 1.1*   ALKPHOS 355* 296* 311*     Recent Labs     01/25/22  0805 01/25/22 1957 01/26/22  0803   INR 1.52* 1.38* 1.35*     Recent Labs     01/24/22  0200 01/24/22  0815 01/24/22  1900 01/25/22  0200 01/25/22  0805   CKTOTAL 4,512*  --   --   --  1,549*   TROPONINI <0.01   < > <0.01 <0.01 <0.01    < > = values in this interval not displayed.        Urinalysis:      Lab Results   Component Value Date    NITRU Negative 01/22/2022    WBCUA 0-2 01/22/2022    BACTERIA Rare 01/22/2022    RBCUA 0-2 01/22/2022    BLOODU TRACE-LYSED 01/22/2022    SPECGRAV >=1.030 01/22/2022    GLUCOSEU 500 01/22/2022       Radiology:  XR CHEST PORTABLE   Final Result   Stable support apparatus. Increasing bilateral airspace opacities which may be related to edema and/or   pneumonia. XR CHEST PORTABLE   Final Result   Low lung volumes/poor inspiratory effort limiting the study. No significant improvement. A mild cardiomegaly. Mild congestion and/or   infiltrates identified in the lungs. No pneumothorax. XR FOOT LEFT (2 VIEWS)   Final Result   1. Remote history of amputation at the 1st and 2nd digits. No evidence of   osteomyelitis at prior resection site. 2. Subtle erosions at the 3rd MTP joint are new. This is adjacent to soft   tissue swelling at the prior amputation site. A new focus of osteomyelitis   is suspected. 3. Soft tissue swelling and questionable subcutaneous gas along the lateral   aspect of the left foot. There is also severe disorganization of bone at the   2nd through 5th tarsometatarsal joints. Although pattern may represent   Charcot arthropathy due to the more diffuse appearance, areas of   osteomyelitis cannot be excluded with plain film. Correlate with physical   exam and clinical workup. A follow-up MRI may be helpful for further   evaluation. XR CHEST PORTABLE   Final Result   Low lung volumes with cardiomegaly and vascular congestion, as well as patchy   airspace disease bilaterally, similar to the previous exam.         XR CHEST PORTABLE   Final Result   Status post advancement of right internal jugular central line with distal   tip now visualized near region of junction of superior vena cava and right   atrium. No evidence of pneumothorax. XR CHEST PORTABLE   Final Result   Improved lung volumes.   Bilateral perihilar opacification, edema versus   infiltrate. Satisfactory position of endotracheal tube. Central line tip in either the   distal brachiocephalic vein or proximal SVC. XR CHEST PORTABLE   Final Result   Cardiomegaly with left mid and lower lung infiltrates. Support tubes as described above. XR CHEST 1 VIEW   Final Result   Limited chest as outlined above. Bilateral perihilar opacification, edema   versus infiltrate. XR CHEST PORTABLE   Final Result   Low lung volumes. No acute cardiopulmonary disease. XR CHEST PORTABLE    (Results Pending)           Assessment/Plan:    Active Hospital Problems    Diagnosis     Abscess of left foot [L02.612]     MSSA bacteremia [R78.81, B95.61]     Third degree burn of left hand [T23.302A]     Syncope [R55]     Acute hypoxemic respiratory failure ([address] [J96.01]     Septic shock (HCC) [A41.9, R65.21]     Cardiopulmonary arrest ([address] [I46.9]     Acute renal failure ([address] [N17.9]     Hyperglycemia [R73.9]     Mixed hyperlipidemia [E78.2]     Cardiomyopathy ([address] [I42.9]     Uncontrolled type 2 diabetes mellitus with diabetic polyneuropathy ([address] [E11.42, E11.65]            #RODRICK.  Patient admitted to hospital with RODRICK.  Normal renal function October 2021. Abril Reno is suspected to be prerenal/ATN.  Creatinine worsened.  Nephrology consulted. [facility] was started on IV fluids  Emergent Vas-Cath placed and CRRT started.  Critical care consulted     #Acute respiratory failure. Suspect patient developed acute pulmonary edema causing acute respiratory failure from renal failure. Intubated 1/23/22.  Pulmonary consulted     #H/o cardiomyopathy - check echo with EF 35%, cardio consulted     #S/p PEA arrest 1/23/22     # Left foot abscess - s/p I&D, ID and podiatry consulted, cx sent-Staph aureus     #MSSA bacteremia. MSSA foot abscess. Septic shock. started Ancef.  ID consultation. Yasmine Prado reviewed. Currently on Levophed. Epinephrine DC'd.   Continue Dmitry     #Diabetes mellitus type 2 uncontrolled.  Insulin.  Continue sliding scale.  Monitor sugars.     #Hypertension.  Blood pressure soft.  Hold blood pressure medications.      Heparin for DVT prophylaxis.   Diet: Diet NPO  Code Status: Full Code    PT/OT Eval Status: ordered    Jumana Woods MD

## 2022-01-26 NOTE — CARE COORDINATION
INTERDISCIPLINARY PLAN OF CARE CONFERENCE    Date/Time: 1/26/2022 1:13 PM  Completed by: Ema Meléndez RN, Case Management      Patient Name:  Alessandro Gomez  YOB: 1975  Admitting Diagnosis: Hyperglycemia [R73.9]  RODRICK (acute kidney injury) ([facility] [address] [N17.9]  Acute renal failure, unspecified acute renal failure type ([facility] [address] [N17.9]  Syncope, unspecified syncope type [R55]     Admit Date/Time:  1/22/2022  1:32 PM    Chart reviewed. Interdisciplinary team contacted or reviewed plan related to patient progress and discharge plans. Disciplines included Case Management, Nursing, and Dietitian. Current Status: ICU/vent CRRT  PT/OT recommendation for discharge plan of care: ordered  CM following for possible IHD (Nephro following). possible OP HD slot needs and long term IVABTX  (ID following). Placement vs home w/HHC.

## 2022-01-26 NOTE — PROGRESS NOTES
Weaned oxygen from 55% to 50%.        01/26/22 1620   Vent Information   Vent Type 980   Vent Mode AC/VC   Vt Ordered 430 mL   Rate Set 24 bmp   Peak Flow 70 L/min   Pressure Support 0 cmH20   FiO2  50 %   SpO2 95 %   SpO2/FiO2 ratio 190   Sensitivity 3   PEEP/CPAP 8   I Time/ I Time % 0 s   Humidification Source Heated wire   Humidification Temp 37   Circuit Condensation Drained   Vent Patient Data   High Peep/I Pressure 0   Peak Inspiratory Pressure 23 cmH2O   Mean Airway Pressure 15 cmH20   Rate Measured 27 br/min   Vt Exhaled 540 mL   Minute Volume 13.2 Liters   I:E Ratio 1:2.50   Cough/Sputum   Cough None   Spontaneous Breathing Trial (SBT) RT Doc   Pulse 86   Breath Sounds   Right Upper Lobe Diminished   Right Middle Lobe Diminished   Right Lower Lobe Diminished   Left Upper Lobe Diminished   Left Lower Lobe Diminished   Additional Respiratory  Assessments   Resp 24   Position Semi-Springer's   Alarm Settings   High Pressure Alarm 40 cmH2O   Low Minute Volume Alarm 3 L/min   High Respiratory Rate 45 br/min   Low Exhaled Vt  300 mL   Patient Observation   Observations water level good   ETT (adult)   Placement Date: 01/23/22   Preoxygenation: Yes  Technique: Direct laryngoscopy  Type: Cuffed  Tube Size: 7.5 mm  Insertion attempts: 1  Secured at: 24 cm  Measured From: Lips   Secured at 24 cm   Measured From Lips

## 2022-01-26 NOTE — PROGRESS NOTES
Patient was started on SBT of 5/5 at 1025, VT's 400, RR 28, RSBI 49. After about 10mins started having desats and increased RR and HR.  Increased SBT to 8/5, had to increase fio2 to 80% to maintain 91% sp02, flipped back after a few minutes to previous settings of A/C.
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
## 2022-01-27 NOTE — PROGRESS NOTES
8:30 PM  Shift assessment completed, see flow sheet. Intubated, SpO2 94% on 50% FiO2 & 8 PEEP. Sedated with fentanyl at 125 mcg/hr and versed at 4 mg/hr. RASS -3 and CPOT 0 with occasional double stacking. Levophed decreased to 2 mcg/min - BP currently 130/69 (86). Munoz catheter in place draining brown, cloudy urine. UO minimal.  OG in place at 64 with TF infusing at 20 ml/hr, GRV 35. Will increase to goal rate as tolerated by pt. CVC & PIVs in place and functioning appropriately, dressings C/D/I. Restraints in use for pt safety. Repositioning pt q2h and PRN. 11:49 PM  Reassessment completed, see flow sheet. Levophed titrated off - /64 (78). Tube feed increased to 60 ml/hr, GRV 50 ml. Sedation & vent settings unchanged. 4:56 AM  Reassessment completed, see flow sheet. Levophed remains off, BP stable. Heparin bolus given & rate changed per pharmacy. CHG bath completed, PRN versed given afterwards. Labs drawn via CVC & ABG drawn via R radial artery w/o issue. GRV 25 ml - TF now at 80 ml/hr.

## 2022-01-27 NOTE — PROGRESS NOTES
01/26/22 1955   Vent Information   Vent Type 980   Vent Mode AC/VC   Vt Ordered 430 mL   Rate Set 24 bmp   Peak Flow 70 L/min   Pressure Support 0 cmH20   FiO2  50 %   SpO2 93 %   SpO2/FiO2 ratio 186   Sensitivity 3   PEEP/CPAP 8   I Time/ I Time % 0 s   Humidification Source Heated wire   Humidification Temp 37   Humidification Temp Measured 37   Circuit Condensation Drained   Vent Patient Data   High Peep/I Pressure 0   Peak Inspiratory Pressure 23 cmH2O   Mean Airway Pressure 15 cmH20   Rate Measured 29 br/min   Vt Exhaled 426 mL   Minute Volume 12.5 Liters   I:E Ratio 1:1.90   Plateau Pressure 18 [id number]   Static Compliance 43 mL/cmH2O   Dynamic Compliance 18 mL/cmH2O   Cough/Sputum   Sputum How Obtained Endotracheal;Suctioned   Cough Productive   Sputum Amount Small   Sputum Color Creamy   Tenacity Thick   Spontaneous Breathing Trial (SBT) RT Doc   Pulse 92   Breath Sounds   Right Upper Lobe Diminished   Right Middle Lobe Diminished   Right Lower Lobe Diminished   Left Upper Lobe Diminished   Left Lower Lobe Diminished   Additional Respiratory  Assessments   Resp 22   Position Semi-Springer's   Alarm Settings   High Pressure Alarm 40 cmH2O   Low Minute Volume Alarm 3 L/min   Apnea (secs) 20 secs   High Respiratory Rate 45 br/min   Low Exhaled Vt  300 mL   ETT (adult)   Placement Date: 01/23/22   Preoxygenation: Yes  Technique: Direct laryngoscopy  Type: Cuffed  Tube Size: 7.5 mm  Insertion attempts: 1  Secured at: 24 cm  Measured From: Lips   Secured at 24 cm   Measured From Lips   ET Placement Right   Secured By Commercial tube wheeler   Site Condition Dry

## 2022-01-27 NOTE — PROGRESS NOTES
Progress Note    HISTORY     CC:  AMS          We are following for acute kidney injury       Subjective/   HPI:  Patient remains in the ICU in critical condition. Off CRRT. Off pressors. Not making any urine     ROS:  Constitutional:  No fevers, hypothermic   Respiratory: On vent  :  Anuric     Social Hx:  No family at the bedside     Past Medical and Surgical History:  - Reviewed, no changes     EXAM       Objective/     Vitals:    01/27/22 0400 01/27/22 0500 01/27/22 0600 01/27/22 0700   BP: 129/72 123/68 108/65 129/67   Pulse: 88 87 86 84   Resp: 21 17 21 19   Temp: 98.5 °F (36.9 °C)      TempSrc: Temporal      SpO2: 94% 96% 96% 97%   Weight:       Height:         24HR INTAKE/OUTPUT:      Intake/Output Summary (Last 24 hours) at 1/27/2022 0843  Last data filed at 1/27/2022 4087  Gross per 24 hour   Intake 1803.55 ml   Output 800 ml   Net 1003.55 ml     Constitutional:  Critically ill   Eyes:  Pupils reactive, sclera clear   Neck:  Normal thyroid, no masses   Cardiovascular:  Regular, no rub  Respiratory: On vent, no wheezing  Psychiatry:  Sedated on vent   Abdomen: +bs, soft, nt, no masses   Musculoskeletal: No LE edema, no clubbing   Lymphatics:  No LAD in neck, no supraclavicular nodes       MEDICAL DECISION MAKING       Data/  Recent Labs     01/26/22  0204 01/26/22  0609 01/27/22  0319   WBC 30.2* 33.1* 29.7*   HGB 12.5* 12.2* 11.3*   HCT 36.6* 35.6* 34.4*   MCV 91.3 91.2 92.3    168 146     Recent Labs     01/26/22  0803 01/26/22  1420 01/27/22  0319   * 133* 132*   K 4.4 4.6 4.7   CL 97* 97* 98*   CO2 25 22 21   GLUCOSE 182* 208* 326*   PHOS 2.6 2.9 4.1   MG 2.50* 2.40 2.70*   BUN 18 19 34*   CREATININE 1.7* 1.5* 2.6*   LABGLOM 44* 51* 27*   GFRAA 53* >60 33*       Assessment/     RODRICK likely related to prerenal factors in the setting of sepsis, use of diuretics and losartan. UA is not suggestive of glomerulonephritis.   Patient did not respond to IV fluid and developed acute pulmonary edema and renal replacement therapy initiated on 1/23/22     -Acute pulmonary edema              Status post intubation     -Acute hypoxic respiratory failure     -cardio respiratory arrest      -Sepsis with MSSA bacteremia     -Morbid obesity     -Diabetes, uncontrolled    Plan/     IV lasix 100 mg x 1  Follow labs  Monitor in/outs  Intermittent hemodialysis tomorrow     -----------------------------  Estela Huerta M.D.   [facility]

## 2022-01-27 NOTE — PLAN OF CARE
Nutrition Problem #1: Inadequate oral intake  Intervention: Food and/or Nutrient Delivery: Continue NPO,Continue Current Tube Feeding  Nutritional Goals: patient will tolerate Vital High-Protein at goal rate of 90 ml/hr x 20 hours without GI distress, without s/s of aspiration, and without additional lab/fluid disturbances
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
## 2022-01-27 NOTE — PROGRESS NOTES
This note also relates to the following rows which could not be included:  Vt Ordered - Cannot attach notes to unvalidated device data  Rate Set - Cannot attach notes to unvalidated device data  Peak Flow - Cannot attach notes to unvalidated device data  Pressure Support - Cannot attach notes to unvalidated device data  FiO2  - Cannot attach notes to unvalidated device data  SpO2 - Cannot attach notes to unvalidated device data  Sensitivity - Cannot attach notes to unvalidated device data  PEEP/CPAP - Cannot attach notes to unvalidated device data  I Time/ I Time % - Cannot attach notes to unvalidated device data  High Peep/I Pressure - Cannot attach notes to unvalidated device data  Peak Inspiratory Pressure - Cannot attach notes to unvalidated device data  Mean Airway Pressure - Cannot attach notes to unvalidated device data  Rate Measured - Cannot attach notes to unvalidated device data  Vt Exhaled - Cannot attach notes to unvalidated device data  Minute Volume - Cannot attach notes to unvalidated device data  I:E Ratio - Cannot attach notes to unvalidated device data  Pulse - Cannot attach notes to unvalidated device data  Resp - Cannot attach notes to unvalidated device data  High Pressure Alarm - Cannot attach notes to unvalidated device data  Low Minute Volume Alarm - Cannot attach notes to unvalidated device data  High Respiratory Rate - Cannot attach notes to unvalidated device data       01/27/22 0259   Vent Information   Vent Type 980   Vent Mode AC/VC   Humidification Source Heated wire   Humidification Temp 37   Humidification Temp Measured 37   Circuit Condensation Drained   Vent Patient Data   Plateau Pressure 17 [id number]   Cough/Sputum   Sputum How Obtained Endotracheal;Suctioned   Cough Non-productive   Sputum Amount None   Breath Sounds   Right Upper Lobe Diminished   Right Middle Lobe Diminished   Right Lower Lobe Diminished   Left Upper Lobe Diminished   Left Lower Lobe Diminished   Additional Respiratory  Assessments   Position Semi-Springer's   Alarm Settings   Apnea (secs) 20 secs   Low Exhaled Vt  300 mL   ETT (adult)   Placement Date: 01/23/22   Preoxygenation: Yes  Technique: Direct laryngoscopy  Type: Cuffed  Tube Size: 7.5 mm  Insertion attempts: 1  Secured at: 24 cm  Measured From: Lips   Secured at 24 cm   Measured From Lips   ET Placement Right  (moved to right)   Secured By Commercial tube wheeler   Site Condition Dry

## 2022-01-27 NOTE — PROGRESS NOTES
Wife and pt's mother at bedside and update given. Versed and fentanyl gtts have been stopped to repeat SBT today. Precedex gtt is at 0.4 mcg.   No changes noted in exam. Vitals and SpO2 stable Louisa Peña RN

## 2022-01-27 NOTE — PROGRESS NOTES
PROGRESS NOTE    Admit Date:  1/22/2022    Subjective:  42 y.o. male who is seen for evaluation of diabetic foot ulcer and abscess left foot. Seen in ICU sedated and on ventilator. History obtained from chart.            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    Chronic osteomyelitis of left foot ([address] 10/27/2020    Closed displaced fracture of distal phalanx of right little finger 4/8/2021    Clostridium difficile infection 11/01/2016    Diabetic ulcer of left forefoot associated with type 2 diabetes mellitus, with necrosis of bone ([address] 8/1/2019    Diabetic ulcer of right great toe associated with type 2 diabetes mellitus, with necrosis of bone ([address] 08/2019    Failed soft 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 toe amputation site 10/26/2020    History of hyperbaric oxygen therapy 10/28/2020    [facility]- [facility] - compromised flap    Migraine     Possible perforated tympanic membrane     as a result of recurrent otitis    Post-op hematoma of left foot 11/12/2020    Recurrent otitis media     Tear of medial meniscus of left knee     Tobacco use     Toe osteomyelitis, left ([address] 7/24/2020       Past Surgical History:        Procedure Laterality Date    KNEE ARTHROSCOPY Left [id number]    LEFT KNEE ARTHROSCOPY , SYNOVECTOMY       OTHER SURGICAL HISTORY Left 07/24/2020    PARTIAL LEFT FOOT AMPUTATION    TOE AMPUTATION Right 8/23/2019    PARTIAL RIGHT FOOT AMPUTATION performed by Ariella Rasheed DPM at [address] [facility] Left 7/24/2020    PARTIAL LEFT FOOT AMPUTATION performed by Ariella Rasheed DPM at [facility] [address] TOE AMPUTATION Left 10/22/2020    PARTIAL LEFT FOOT AMPUTATION WITH GRAFT APPLICATION performed by Ariella Rasheed DPM at [address] [facility]         Current Medications:     mupirocin   Nasal BID    insulin glargine  30 Units SubCUTAneous BID    insulin lispro  0-18 Units SubCUTAneous Q4H    famotidine (PEPCID) injection  20 mg IntraVENous Daily    sodium chloride flush  5-40 mL IntraVENous 2 times per day    ceFAZolin 2000 mg in 20 mL SWFI IV Syringe  2,000 mg IntraVENous Q12H    hydrocortisone sodium succinate PF  50 mg IntraVENous Q6H    chlorhexidine  15 mL Mouth/Throat BID       Allergies:  Januvia [sitagliptin], Metformin and related, Vancomycin, and Mustard oil [allyl isothiocyanate]    Social History:    Social History     Tobacco Use    Smoking status: Former Smoker     Packs/day: 0.50     Years: 2.00     Pack years: 1.00     Quit date: 2005     Years since quittin.4    Smokeless tobacco: Former User     Quit date: 2005    Tobacco comment: SMOKED OCCASIONALLY UNTIL 8 YEARS AGO   Vaping Use    Vaping Use: Never used   Substance Use Topics    Alcohol use: Yes     Comment: RARELY    Drug use: No       Family History:       Problem Relation Age of Onset    Diabetes Mother     High Blood Pressure Mother     High Cholesterol Mother     Diabetes Father     High Blood Pressure Father     High Cholesterol Father     Stroke Father     High Blood Pressure Sister     High Blood Pressure Maternal Uncle     High Cholesterol Maternal Uncle     High Blood Pressure Maternal Grandmother        Review of Systems    Not obtained      Objective:   /67   Pulse 84   Temp 98.5 °F (36.9 °C) (Temporal)   Resp 19   Ht 6' 1\" (1.854 m)   Wt 295 lb 1.6 oz (133.9 kg)   SpO2 97%   BMI 38.93 kg/m²     Data:  CBC:   Recent Labs     22  0204 22  0609 22  0319   WBC 30.2* 33.1* 29.7*   HGB 12.5* 12.2* 11.3*   HCT 36.6* 35.6* 34.4*   MCV 91.3 91.2 92.3    168 146     BMP:   Recent Labs     22  0803 22  1420 22  0319   * 133* 132*   K 4.4 4.6 4.7   CL 97* 97* 98*   CO2 25 22 21   PHOS 2.6 2.9 4.1   BUN 18 19 34*   CREATININE 1.7* 1.5* 2.6* LIVER PROFILE:   Recent Labs     01/25/22  0805 01/26/22  0609 01/27/22  0319   * 120* 85*   * 52* 23   BILIDIR 1.1* 0.7* 0.5*   BILITOT 1.6* 1.1* 0.7   ALKPHOS 296* 311* 297*     PT/INR:   Recent Labs     01/25/22  0805 01/25/22 1957 01/26/22  0609 01/26/22  0803 01/27/22  0319   PROT 5.9*  --  6.1*  --  5.8*   INR 1.52* 1.38*  --  1.35*  --      HgBA1c:  Lab Results   Component Value Date    LABA1C 10.6 01/23/2022       Cultures: blood - MSSA    Wound - MSSA    Imaging: xray left foot -   Remote surgical history of amputation at the 1st and 2nd digits. Margins of   the remaining 1st metatarsal are smooth with slight bony remodeling following   prior surgery. Margins of the remaining 2nd metatarsal are also smooth with   heterotopic bone and fusion of fragments at the base of the previously   remaining proximal phalanx. Subtle erosions are seen at the 3rd MTP joint   involving the base of the phalanx and the distal head of the 3rd metatarsal.   There is severe soft tissue swelling at the prior surgical site. Questionable pattern of subcutaneous gas along the lateral aspect of the left   foot adjacent to the shaft of the 5th metatarsal.  There is severe   disorganization of bone at the tarsometatarsal joints of the 2nd through 5th   digits. Impression:  1. Remote history of amputation at the 1st and 2nd digits. No evidence of   osteomyelitis at prior resection site. 2. Subtle erosions at the 3rd MTP joint are new. This is adjacent to soft   tissue swelling at the prior amputation site. A new focus of osteomyelitis   is suspected. 3. Soft tissue swelling and questionable subcutaneous gas along the lateral   aspect of the left foot. There is also severe disorganization of bone at the   2nd through 5th tarsometatarsal joints. Although pattern may represent   Charcot arthropathy due to the more diffuse appearance, areas of   osteomyelitis cannot be excluded with plain film.   Correlate with physical   exam and clinical workup. A follow-up MRI may be helpful for further   evaluation. Physical Exam:    DP/PT palpable bilateral  Right foot - no open lesions noted. No pressure lesions noted. Left foot - Ulcer on the dorsal midfoot which with compression reveals serosanguinous drainage mixed with purulence (less purulence today). No malodor noted. No crepitus noted. + edema of the foot. Mild erythema periwound. No increase in skin temperature noted. Probes to bone of metatarsal. + necrotic/infected fascia expressed with compression. Plantarly the foot does not reveal any signs of infection. Prior amputation hallux and 2nd digit  Rocker bottom foot deformity noted left. Assessment:  Patient Active Problem List   Diagnosis Code    Hypertension I10    Uncontrolled type 2 diabetes mellitus with diabetic polyneuropathy ([facility]) E11.42, E11.65    Cardiomyopathy ([facility] I42.9    Mixed hyperlipidemia E78.2    Hyperglycemia R73.9    Allergic rhinitis J30.9    Osteoarthritis M19.90    Acute renal failure (HCC) N17.9    MSSA bacteremia R78.81, B95.61    Third degree burn of left hand T23.302A    Syncope R55    Acute hypoxemic respiratory failure ([facility]) J96.01    Septic shock ([facility]) A41.9, R65.21    Cardiopulmonary arrest ([facility]) I46.9    Abscess of left foot L02.612    Hypertriglyceridemia E78.1     Abscess left foot  diabetic foot ulcer left secondary to peripheral neuropathy   diabetes mellitus uncontrolled  Bacteremia (MSSA)  Charcot arthropathy left foot      Plan  Patient examined. Reviewed labs and imaging. Antibiotics as per Dr. Rand Trammell. Seen in conjunction with him today. Compression of the forefoot to drain abscess. Packed foot with iodoform and dry dressing. When more stable may require further imaging with MRI. Given Charcot arthropathy imaging may not be able to confirm or deny osteomyelitis of the midfoot. MRI will allow for better evaluation of soft tissues.  I would expect him to require long term treatment for osteomyelitis given purulence has been sitting around the midfoot bones. Will follow.          Electronically signed by Mónica Butt DPM on 1/27/2022 at 8:36 AM.

## 2022-01-27 NOTE — PROGRESS NOTES
Hospitalist Progress Note      PCP: Bo Camarena MD    Date of Admission: 1/22/2022     Admitted with acute hypoxic respiratory failure, septic shock and MSSA bacteremia  Left foot abscess drained 1/24/2022  Cardiopulmonary arrest x2 on 1/23/2022, required CPR  Went with acute kidney injury currently on CRRT    Subjective: on CRRT, still on levophed  Off epinephrine  On insulin drip    1/27  Failed SBT    CRRT stopped-Lasix 100 mg IV x1 given  Off levophed     Medications:  Reviewed    Infusion Medications    dexmedetomidine HCl in NaCl      midazolam 4 mg/hr (01/27/22 0959)    heparin 25,000 units in dextrose 5% 250 mL infusion 1,400 Units/hr (01/27/22 0622)    sodium chloride 5 mL/hr at 01/27/22 0609    dextrose      prismaSATE BGK 4/2.5 2,000 mL/hr at 01/26/22 0545    prismaSATE BGK 4/2.5 1,000 mL/hr at 01/26/22 0545    prismaSATE BGK 4/2.5 1,000 mL/hr at 01/26/22 0545    fentaNYL 125 mcg/hr (01/27/22 0609)    norepinephrine Stopped (01/26/22 2254)     Scheduled Medications    ceFAZolin 2000 mg in 20 mL SWFI IV Syringe  2,000 mg IntraVENous Q24H    hydrocortisone sodium succinate PF  50 mg IntraVENous Q12H    insulin glargine  40 Units SubCUTAneous BID    insulin glargine  10 Units SubCUTAneous Once    mupirocin   Nasal BID    insulin lispro  0-18 Units SubCUTAneous Q4H    famotidine (PEPCID) injection  20 mg IntraVENous Daily    sodium chloride flush  5-40 mL IntraVENous 2 times per day    chlorhexidine  15 mL Mouth/Throat BID     PRN Meds: [START ON 1/28/2022] ceFAZolin, midazolam, heparin (porcine), heparin (porcine), heparin (porcine), sodium chloride flush, sodium chloride, acetaminophen **OR** acetaminophen, glucose, glucagon (rDNA), dextrose, dextrose bolus (hypoglycemia) **OR** dextrose bolus (hypoglycemia), magnesium sulfate, calcium gluconate **OR** calcium gluconate **OR** calcium gluconate **OR** calcium gluconate, sodium phosphate IVPB **OR** sodium phosphate IVPB **OR** sodium phosphate IVPB **OR** sodium phosphate IVPB      Intake/Output Summary (Last 24 hours) at 1/27/2022 1055  Last data filed at 1/27/2022 7026  Gross per 24 hour   Intake 1573.55 ml   Output 553 ml   Net 1020.55 ml       Physical Exam Performed:    /65   Pulse 94   Temp 97.5 °F (36.4 °C) (Bladder)   Resp 19   Ht 6' 1\" (1.854 m)   Wt 295 lb 1.6 oz (133.9 kg)   SpO2 95%   BMI 38.93 kg/m²       Gen: intubated  Eyes: PERRL. No sclera icterus. No conjunctival injection. ENT: No discharge. Pharynx clear. Neck: Trachea midline. Normal thyroid. Resp:+ accessory muscle use. + crackles. No wheezes. No rhonchi. CV: INCREASED rate. Regular rhythm. No murmur or rub. No edema. GI: Non-tender. Non-distended. No masses. No organomegaly. Normal bowel sounds. No hernia. Skin: burns left hand with some yellow slough. Chronic skin changes both legs.   Lymph: No cervical LAD. No supraclavicular LAD. M/S: No cyanosis. No joint deformity. No clubbing.  Missing right big toe  Neuro: intubated, cannot be assessed  Psych: intubated, cannot be assessed    Labs:   Recent Labs     01/26/22  0204 01/26/22  0609 01/27/22 0319   WBC 30.2* 33.1* 29.7*   HGB 12.5* 12.2* 11.3*   HCT 36.6* 35.6* 34.4*    168 146     Recent Labs     01/26/22  0803 01/26/22  1420 01/27/22  0319   * 133* 132*   K 4.4 4.6 4.7   CL 97* 97* 98*   CO2 25 22 21   BUN 18 19 34*   CREATININE 1.7* 1.5* 2.6*   CALCIUM 7.8* 7.8* 8.2*   PHOS 2.6 2.9 4.1     Recent Labs     01/25/22  0805 01/26/22  0609 01/27/22  0319   * 120* 85*   * 52* 23   BILIDIR 1.1* 0.7* 0.5*   BILITOT 1.6* 1.1* 0.7   ALKPHOS 296* 311* 297*     Recent Labs     01/25/22  0805 01/25/22 1957 01/26/22  0803   INR 1.52* 1.38* 1.35*     Recent Labs     01/24/22  1900 01/25/22  0200 01/25/22  0805   CKTOTAL  --   --  1,549*   TROPONINI <0.01 <0.01 <0.01       Urinalysis:      Lab Results   Component Value Date    NITRU Negative 01/22/2022    WBCUA 0-2 01/22/2022    BACTERIA Rare 01/22/2022    RBCUA 0-2 01/22/2022    BLOODU TRACE-LYSED 01/22/2022    SPECGRAV >=1.030 01/22/2022    GLUCOSEU 500 01/22/2022       Radiology:  XR CHEST PORTABLE   Final Result   Patchy airspace disease, left greater than right which may represent   atelectasis or pneumonia. XR CHEST PORTABLE   Final Result   Stable support apparatus. Increasing bilateral airspace opacities which may be related to edema and/or   pneumonia. XR CHEST PORTABLE   Final Result   Low lung volumes/poor inspiratory effort limiting the study. No significant improvement. A mild cardiomegaly. Mild congestion and/or   infiltrates identified in the lungs. No pneumothorax. XR FOOT LEFT (2 VIEWS)   Final Result   1. Remote history of amputation at the 1st and 2nd digits. No evidence of   osteomyelitis at prior resection site. 2. Subtle erosions at the 3rd MTP joint are new. This is adjacent to soft   tissue swelling at the prior amputation site. A new focus of osteomyelitis   is suspected. 3. Soft tissue swelling and questionable subcutaneous gas along the lateral   aspect of the left foot. There is also severe disorganization of bone at the   2nd through 5th tarsometatarsal joints. Although pattern may represent   Charcot arthropathy due to the more diffuse appearance, areas of   osteomyelitis cannot be excluded with plain film. Correlate with physical   exam and clinical workup. A follow-up MRI may be helpful for further   evaluation. XR CHEST PORTABLE   Final Result   Low lung volumes with cardiomegaly and vascular congestion, as well as patchy   airspace disease bilaterally, similar to the previous exam.         XR CHEST PORTABLE   Final Result   Status post advancement of right internal jugular central line with distal   tip now visualized near region of junction of superior vena cava and right   atrium. No evidence of pneumothorax.          XR CHEST PORTABLE sent-Staph aureus     #MSSA bacteremia. MSSA foot abscess. Septic shock. started Ancef.  ID consultation. Vandana Farley reviewed. Currently on Levophed. Epinephrine DC'd. Continue Solu-Cortef. now off levo    A. fib with controlled ventricular rate  On heparin drip     #Diabetes mellitus type 2 uncontrolled.  Insulin.  Continue sliding scale.  Monitor sugars.     #Hypertension.  Blood pressure soft.  Hold blood pressure medications.      Heparin gtt for DVT prophylaxis.   Diet: Diet NPO  ADULT TUBE FEEDING; Orogastric; Peptide Based High Protein; Continuous; 20; Yes; 20; Q 4 hours; 90; 30; Q 4 hours  Code Status: Full Code    PT/OT Eval Status: ordered    Margarette Cushing, MD

## 2022-01-27 NOTE — PROGRESS NOTES
Pt moving some and is breathing above vent. RT has placed pt on PS trial and pt maintain good tidal volumes and rate ~ 20 min. Precedex gtt at 0.4 mcg.  Family at bedside Dennis

## 2022-01-27 NOTE — PROGRESS NOTES
[facility] Infectious Disease Progress Note      Harriett Chand     : 1975    DATE OF VISIT:  2022  DATE OF ADMISSION:  2022       Subjective:     Harriett Chand is a 42 y.o. male whom I've been seeing for an MSSA left foot abscess with bacteremia and septic shock. Since I last saw him, a couple more improvements. Off pressors, at least at the moment. FIO2 peaked at 55%, now 50%. Off CRRT right now, still nearly anuric. WBC down just a bit. Still sedated, on the vent, not responsive right now. Mr. 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     Current Facility-Administered Medications: furosemide (LASIX) injection 100 mg, 100 mg, IntraVENous, Once  mupirocin (BACTROBAN) 2 % ointment, , Nasal, BID  insulin glargine (LANTUS) injection vial 30 Units, 30 Units, SubCUTAneous, BID  insulin lispro (HUMALOG) injection vial 0-18 Units, 0-18 Units, SubCUTAneous, Q4H  midazolam (VERSED) injection 2 mg, 2 mg, IntraVENous, Q1H PRN  midazolam (VERSED) 1 mg/mL in D5W infusion, 1-10 mg/hr, IntraVENous, Continuous  heparin (porcine) injection 1,300 Units, 1,300 Units, IntraCATHeter, PRN  famotidine (PEPCID) injection 20 mg, 20 mg, IntraVENous, Daily  heparin (porcine) injection 4,000 Units, 4,000 Units, IntraVENous, PRN  heparin (porcine) injection 2,000 Units, 2,000 Units, IntraVENous, PRN  heparin (porcine) 25,000 Units in dextrose 5 % 250 mL infusion, 1,400 Units/hr, IntraVENous, Continuous  sodium chloride flush 0.9 % injection 5-40 mL, 5-40 mL, IntraVENous, 2 times per day  sodium chloride flush 0.9 % injection 5-40 mL, 5-40 mL, IntraVENous, PRN  0.9 % sodium chloride infusion, 25 mL, IntraVENous, PRN  acetaminophen (TYLENOL) tablet 650 mg, 650 mg, Oral, Q6H PRN **OR** acetaminophen (TYLENOL) suppository 650 mg, 650 mg, Rectal, Q6H PRN  glucose (GLUTOSE) 40 % oral gel 15 g, 15 g, Oral, PRN  glucagon (rDNA) injection 1 mg, 1 mg, IntraMUSCular, PRN  dextrose 5 % solution, 100 mL/hr, IntraVENous, PRN  dextrose bolus (hypoglycemia) 10% 125 mL, 125 mL, IntraVENous, PRN **OR** dextrose bolus (hypoglycemia) 10% 250 mL, 250 mL, IntraVENous, PRN  prismaSATE BGK 4/2.5 dialysis solution, , Dialysis, Continuous  prismaSATE BGK 4/2.5 dialysis solution, , Dialysis, Continuous  prismaSATE BGK 4/2.5 dialysis solution, , Dialysis, Continuous  magnesium sulfate 1000 mg in dextrose 5% 100 mL IVPB, 1,000 mg, IntraVENous, PRN  calcium gluconate 1,000 mg in dextrose 5 % 100 mL IVPB, 1,000 mg, IntraVENous, PRN **OR** calcium gluconate 2,000 mg in dextrose 5 % 100 mL IVPB, 2,000 mg, IntraVENous, PRN **OR** calcium gluconate 3,000 mg in dextrose 5 % 100 mL IVPB, 3,000 mg, IntraVENous, PRN **OR** calcium gluconate 4,000 mg in dextrose 5 % 100 mL IVPB, 4,000 mg, IntraVENous, PRN  sodium phosphate 6 mmol in dextrose 5 % 250 mL IVPB, 6 mmol, IntraVENous, PRN **OR** sodium phosphate 12 mmol in dextrose 5 % 250 mL IVPB, 12 mmol, IntraVENous, PRN **OR** sodium phosphate 18 mmol in dextrose 5 % 500 mL IVPB, 18 mmol, IntraVENous, PRN **OR** sodium phosphate 24 mmol in dextrose 5 % 500 mL IVPB, 24 mmol, IntraVENous, PRN  fentaNYL (SUBLIMAZE) 1,000 mcg in sodium chloride 0.9% 100 mL infusion, 12.5-200 mcg/hr, IntraVENous, Continuous  norepinephrine (LEVOPHED) 16 mg in dextrose 5 % 250 mL infusion, 2-100 mcg/min, IntraVENous, Continuous  ceFAZolin (ANCEF) 2000 mg in sterile water 20 mL IV syringe, 2,000 mg, IntraVENous, Q12H  hydrocortisone sodium succinate PF (SOLU-CORTEF) injection 50 mg, 50 mg, IntraVENous, Q6H  chlorhexidine (PERIDEX) 0.12 % solution 15 mL, 15 mL, Mouth/Throat, BID     This is day 5 of Ancef, also stress-dose steroids. Allergies: Januvia [sitagliptin], Metformin and related, Vancomycin, and Mustard oil [allyl isothiocyanate]    Pertinent items from the review of systems are discussed in the HPI; the remainder of the ROS was reviewed and is negative.      Objective:     Vital signs over the last 24 hours:  Temp  Av.3 °F (36.8 °C)  Min: 97.5 °F (36.4 °C)  Max: 98.8 °F (37.1 °C)  Pulse  Av  Min: 76  Max: 97  Systolic (80MZL), UQY:017 , Min:99 , IXO:662   Diastolic (17GTC), YHX:00, Min:52, Max:72  Resp  Av.9  Min: 12  Max: 29  SpO2  Av.9 %  Min: 90 %  Max: 97 %    Constitutional:  well-developed, well-nourished, sedated, unresponsive, on the vent  Psychiatric:  Unable to assess   Eyes:  pupils equal, round, reactive to light today; sclerae anicteric, conjunctivae not pale, no subconj bleed  ENT:  atraumatic; no labial ulcers; orally intubated  Resp:  lungs I think clearer to auscultation BL, decreased at the bases  Cardiovascular:  heart regular, tachy, decreased heart sounds, no murmur able to be heard; generally mild extremity edema, mild-mod LLE; no IV phlebitis; right IJ CVC in place, and a right femoral CRRT line, a couple of peripherals  GI:  abdomen soft, NT, mildly distended, hypoactive bowel sounds, no palpable masses or organomegaly  Musculoskeletal:  no clubbing, cyanosis or petechiae; extremities with no gross effusions, joint misalignment or acute arthritis  Skin: warm, dry, normal turgor; no acute rash, no peripheral stigmata of endocarditis. Right lower leg changes chronic and stable; left hand not examined today. Left foot still swollen, warm, not quite as much reactive erythema. Packing removed with Dr. Erin Null - still a moderate amount of both bloody and purulent drainage, seems now to be more aligned with his 3rd or 4th met, bone definitely palpable, a bit of fascial necrosis seen as well.   ______________________________    Recent Labs     01/27/22  0319 01/26/22  0609 01/26/22  0204   WBC 29.7* 33.1* 30.2*   HGB 11.3* 12.2* 12.5*   HCT 34.4* 35.6* 36.6*   MCV 92.3 91.2 91.3    168 165     Lab Results   Component Value Date    CREATININE 2.6 (H) 01/27/2022     Lab Results   Component Value Date    LABALBU 2.3 (L) 01/27/2022     Lab Results   Component Value Date    ALT 23 01/27/2022    AST 85 (H) 01/27/2022    ALKPHOS 297 (H) 01/27/2022    BILITOT 0.7 01/27/2022      Lab Results   Component Value Date    LABA1C 10.6 01/23/2022     Other recent pertinent labs: Anion gap 13. Glucoses up around 400 now (off insulin drip). Those LFTs continue to come down. WBC diff with just 6% immature forms today.   ______________________________    Recent pertinent micro results:  Nothing new -- initial BCx (+) MSSA, repeats (-), left foot abscess (+) MSSA, sputum also (+) MSSA, but I'm not convinced he's had a respiratory infection. ______________________________    Recent imaging results (last 7 days):     XR FOOT LEFT (2 VIEWS)    Result Date: 1/24/2022  1. Remote history of amputation at the 1st and 2nd digits. No evidence of osteomyelitis at prior resection site. 2. Subtle erosions at the 3rd MTP joint are new. This is adjacent to soft tissue swelling at the prior amputation site. A new focus of osteomyelitis is suspected. 3. Soft tissue swelling and questionable subcutaneous gas along the lateral aspect of the left foot. There is also severe disorganization of bone at the 2nd through 5th tarsometatarsal joints.   Although pattern may represent Charcot arthropathy due to the more diffuse appearance, areas of osteomyelitis cannot be excluded with plain film. Correlate with physical exam and clinical workup. A follow-up MRI may be helpful for further evaluation. XR CHEST PORTABLE    Result Date: 1/27/2022  Patchy airspace disease, left greater than right which may represent atelectasis or pneumonia. [on my review, I think it looks like presumed atelectasis / effusions at the bases, and then I think improving CHF / edema overall, compared to yesterday.]    XR CHEST PORTABLE    Result Date: 1/26/2022  Stable support apparatus. Increasing bilateral airspace opacities which may be related to edema and/or pneumonia. XR CHEST PORTABLE    Result Date: 1/25/2022  Low lung volumes/poor inspiratory effort limiting the study. No significant improvement. A mild cardiomegaly. Mild congestion and/or infiltrates identified in the lungs. No pneumothorax. XR CHEST PORTABLE    Result Date: 1/25/2022  Status post advancement of right internal jugular central line with distal tip now visualized near region of junction of superior vena cava and right atrium. No evidence of pneumothorax. XR CHEST PORTABLE    Result Date: 1/24/2022  Low lung volumes with cardiomegaly and vascular congestion, as well as patchy airspace disease bilaterally, similar to the previous exam.     XR CHEST PORTABLE    Result Date: 1/23/2022  Improved lung volumes. Bilateral perihilar opacification, edema versus infiltrate. Satisfactory position of endotracheal tube. Central line tip in either the distal brachiocephalic vein or proximal SVC. XR CHEST PORTABLE    Result Date: 1/23/2022  Cardiomegaly with left mid and lower lung infiltrates. Support tubes as described above. XR CHEST PORTABLE    Result Date: 1/22/2022  Low lung volumes. No acute cardiopulmonary disease. XR CHEST 1 VIEW    Result Date: 1/23/2022  Limited chest as outlined above.   Bilateral perihilar opacification, edema versus infiltrate. Assessment:     Patient Active Problem List   Diagnosis Code    Hypertension I10    Uncontrolled type 2 diabetes mellitus with diabetic polyneuropathy ([facility]) E11.42, E11.65    Cardiomyopathy ([facility] I42.9    Mixed hyperlipidemia E78.2    Hyperglycemia R73.9    Allergic rhinitis J30.9    Osteoarthritis M19.90    Acute renal failure (HCC) N17.9    MSSA bacteremia R78.81, B95.61    Third degree burn of left hand T23.302A    Syncope R55    Acute hypoxemic respiratory failure ([facility]) J96.01    Septic shock ([facility]) A41.9, R65.21    Cardiopulmonary arrest ([facility]) I46.9    Abscess of left foot L02.612    Hypertriglyceridemia E78.1     Assessment of today's active condition(s):      --          Background of uncontrolled DM2, neuropathy, no known PAD, multiple prior diabetic foot ulcers, infections, surgeries. Most recent wounds were at the left 1st and 2nd ray, but they had been healed for just about a year.      --          Admission this time with a fairly nonfocal sepsis syndrome, at least in terms of symptoms, complicated by shock, acute renal failure, lactic acidosis, then cardiac arrest, resuscitation, acute respiratory failure, rapid Afib. Currently with vent support, sedation, heparin, right now off vasopressors, off insulin drip, and off CRRT. Found to have MSSA bacteremia, and a sizeable MSSA foot abscess.  Based on how far I'm able to probe, I do have a high suspicion of active osteomyelitis in the foot, though those XR changes are very hard to determine osteo from Charcot changes alone.     --          Systemically, things at least seem slightly improved over the last 24 hours, at least in terms of BP and WBC count.       --          Third degree burn of left hand, but no strong signs of infection there, at least nothing deep. An acute Staph aureus endocarditis could still be a possibility, but the foot definitely seems the source of the initial bacteremia, and that's not shown to be sustained (so far). TTE does not have great sensitivity for endocarditis, but my clinical suspicion isn't too high.     --          Multifactorial leukocytosis with impressive left shift -- I think from the abscess (perhaps still not totally drained), the bacteremia, the stress of acute shock and organ failure, and the effect of steroids. WBC and left shift just a bit better today. CK and LFTs improving, which is encouraging too. Treatment recs:     Left foot re-packed with iodoform, then 4x4s and Kerlix - we'll change again tomorrow AM.    Daily dressings for the left hand (Triad, dry dressing). Continue Ancef, but will need to adjust dose now that he's off CRRT. Will still maintain a relatively high dose, given severity of infection -- 2gm q24, with an extra 1gm after HD. Await repeat BCx, but I expect they'll remain negative. Watch for Cdiff, line infection, allergy, etc.    When a bit more stable, left foot MRI, and then surgery discussions, once he's clinically well enough to be determined to be likely to tolerate that. D/W Dr. Betsy Umana.      Electronically signed by Jeffrey Drake MD on 1/27/2022 at 9:01 AM.

## 2022-01-27 NOTE — PROGRESS NOTES
1/27 0319  Xa = 0.29  2000 bolus; rate = 14 ml/hr.   Next Xa One [facility], Pharm D.1/27/2022 3:58 AM

## 2022-01-27 NOTE — PROGRESS NOTES
Pt's respirations appear more labored at end of SBT and he appears restless moving head side to side but does not open eyes or follow commands. HR up to 100 and /95. RT has placed him back on A/C.  Versed and fentanyl gtts resumed at 50% of prior rates - see THIERRY Beasley RN

## 2022-01-27 NOTE — PROGRESS NOTES
Pharmacy-Low dose Heparin Drip  Current heparin rate= 1200 units/hr (12 ml/hr)  Anti-Xa @ 2050 = 0.30 IU/ml  Goal 0.3-0.7 IU/ml  Per protocol, no bolus and continue current rate of 1200 units/hr (12 ml/hr). Per protocol we would change to daily monitoring, but given that patient is barely in the therapeutic range will order another anti-Xa in 6 hours to be sure they are still therapeutic.  Next Anti-xa due 1/27 @ 5022

## 2022-01-27 NOTE — PROGRESS NOTES
Care rounds completed with Dr Jean and multidisciplinary team.  Reviewed labs, meds, VS (temp/HR/RR), I/O's, assessment, & plan of care for today. See progress note & new orders for details.  Alesia Dandy RN

## 2022-01-27 NOTE — PROGRESS NOTES
01/26/22 2327   Vent Information   Vent Type 980   Vent Mode AC/VC   Vt Ordered 430 mL   Rate Set 24 bmp   Peak Flow 70 L/min   Pressure Support 0 cmH20   FiO2  50 %   SpO2 93 %   SpO2/FiO2 ratio 186   Sensitivity 3   PEEP/CPAP 8   I Time/ I Time % 0 s   Humidification Source Heated wire   Humidification Temp 37   Humidification Temp Measured 37   Circuit Condensation Drained   Vent Patient Data   High Peep/I Pressure 0   Peak Inspiratory Pressure 25 cmH2O   Mean Airway Pressure 13 cmH20   Rate Measured 24 br/min   Vt Exhaled 430 mL   Minute Volume 10.8 Liters   I:E Ratio 1:2.70   Plateau Pressure 17 BUB89   Cough/Sputum   Sputum How Obtained Suctioned;Endotracheal   Cough Non-productive   Spontaneous Breathing Trial (SBT) RT Doc   Pulse 90   Breath Sounds   Right Upper Lobe Diminished   Right Middle Lobe Diminished   Right Lower Lobe Diminished   Left Upper Lobe Diminished   Left Lower Lobe Diminished   Additional Respiratory  Assessments   Resp 19   Position Semi-Springer's   Oral Care Completed? Yes   Oral Care Mouth suctioned   Subglottic Suction Done?  Yes   Alarm Settings   High Pressure Alarm 40 cmH2O   Low Minute Volume Alarm 3 L/min   Apnea (secs) 20 secs   High Respiratory Rate 45 br/min   Low Exhaled Vt  300 mL   ETT (adult)   Placement Date: 01/23/22   Preoxygenation: Yes  Technique: Direct laryngoscopy  Type: Cuffed  Tube Size: 7.5 mm  Insertion attempts: 1  Secured at: 24 cm  Measured From: Lips   Secured at 24 cm   Measured From [address] [employer] tube wheeler   Site Condition Dry

## 2022-01-27 NOTE — PROGRESS NOTES
Pt resp effort remains labored and rate 38-40 min No change in exam except temp has increased to 99.8. Pt medicated with prn versed and versed and fentanyl gtts increased - see MAR. Left hand wound re dressed using Triad ointment and guaze wrap.  Small amt of serous sang drainage from wound Maggie Pritchett RN

## 2022-01-28 NOTE — PROGRESS NOTES
01/27/22 1941   Vent Information   Vent Type 980   Vent Mode AC/VC   Vt Ordered 430 mL   Rate Set 22 bmp   Peak Flow 70 L/min   Pressure Support 0 cmH20   FiO2  45 %   SpO2 98 %   SpO2/FiO2 ratio 217.78   Sensitivity 3   PEEP/CPAP 8  (decreased to 5)   I Time/ I Time % 0 s   Humidification Source Heated wire   Humidification Temp 37   Humidification Temp Measured 37   Circuit Condensation Drained   Vent Patient Data   High Peep/I Pressure 0   Peak Inspiratory Pressure 24 cmH2O   Mean Airway Pressure 13 cmH20   Rate Measured 26 br/min   Vt Exhaled 432 mL   Minute Volume 11.3 Liters   I:E Ratio 1:3.00   Plateau Pressure 16 CKE18   Static Compliance 54 mL/cmH2O   Dynamic Compliance 27 mL/cmH2O   Cough/Sputum   Sputum How Obtained Endotracheal;Suctioned   Cough Productive   Sputum Amount Large   Sputum Color Creamy; Yellow   Tenacity Thick   Spontaneous Breathing Trial (SBT) RT Doc   Pulse 74   Breath Sounds   Right Upper Lobe Diminished   Right Middle Lobe Diminished   Right Lower Lobe Diminished   Left Upper Lobe Diminished   Left Lower Lobe Diminished   Additional Respiratory  Assessments   Resp 18   Position Semi-Springer's   Alarm Settings   High Pressure Alarm 40 cmH2O   Low Minute Volume Alarm 3 L/min   Apnea (secs) 20 secs   High Respiratory Rate 45 br/min   Low Exhaled Vt  300 mL   ETT (adult)   Placement Date: 01/23/22   Preoxygenation: Yes  Technique: Direct laryngoscopy  Type: Cuffed  Tube Size: 7.5 mm  Insertion attempts: 1  Secured at: 24 cm  Measured From: Lips   Secured at 24 cm   Measured From [address] [employer] tube wheeler   Site Condition Dry

## 2022-01-28 NOTE — PROGRESS NOTES
Pharmacy-Low dose Heparin Drip  Current heparin rate= 1400 units/hr (12 ml/hr)  Anti-Xa @ 0439= 0.28 IU/ml  Goal 0.3-0.7 IU/ml  Per protocol, 2000 bolus and continue increase rate to 1600 units/hr (16ml/hr).   Next Heparin Anti-Xa @ [address] 1/28/20228:06 AM  .

## 2022-01-28 NOTE — PROGRESS NOTES
Care rounds complete with Dr. Rock Mark. Discussed high blood sugars, steroids stopped, lantus increased to 50 BID. Patient to go for head CT today.

## 2022-01-28 NOTE — PROGRESS NOTES
PROGRESS NOTE    Admit Date:  1/22/2022    Subjective:  42 y.o. male who is seen for evaluation of diabetic foot ulcer and abscess left foot. Seen in ICU sedated and on ventilator. History obtained from chart.            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    Chronic osteomyelitis of left foot ([address] 10/27/2020    Closed displaced fracture of distal phalanx of right little finger 4/8/2021    Clostridium difficile infection 11/01/2016    Diabetic ulcer of left forefoot associated with type 2 diabetes mellitus, with necrosis of bone ([address] 8/1/2019    Diabetic ulcer of right great toe associated with type 2 diabetes mellitus, with necrosis of bone ([address] 08/2019    Failed soft 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 toe amputation site 10/26/2020    History of hyperbaric oxygen therapy 10/28/2020    [facility]- [facility] - compromised flap    Migraine     Possible perforated tympanic membrane     as a result of recurrent otitis    Post-op hematoma of left foot 11/12/2020    Recurrent otitis media     Tear of medial meniscus of left knee     Tobacco use     Toe osteomyelitis, left ([address] 7/24/2020       Past Surgical History:        Procedure Laterality Date    KNEE ARTHROSCOPY Left [id number]    LEFT KNEE ARTHROSCOPY , SYNOVECTOMY       OTHER SURGICAL HISTORY Left 07/24/2020    PARTIAL LEFT FOOT AMPUTATION    TOE AMPUTATION Right 8/23/2019    PARTIAL RIGHT FOOT AMPUTATION performed by Elvia Hale DPM at [facility] Left 7/24/2020    PARTIAL LEFT FOOT AMPUTATION performed by Elvia Hale DPM at [facility] Left 10/22/2020    PARTIAL LEFT FOOT AMPUTATION WITH GRAFT APPLICATION performed by Elvia Hale DPM at [address] [place] [address]         Current Medications:     ceFAZolin 2000 mg in 20 mL SWFI IV Syringe  2,000 mg IntraVENous Q24H    hydrocortisone sodium succinate PF  50 mg IntraVENous Q12H    insulin glargine  40 Units SubCUTAneous BID    mupirocin   Nasal BID    insulin lispro  0-18 Units SubCUTAneous Q4H    famotidine (PEPCID) injection  20 mg IntraVENous Daily    sodium chloride flush  5-40 mL IntraVENous 2 times per day    chlorhexidine  15 mL Mouth/Throat BID       Allergies:  Januvia [sitagliptin], Metformin and related, Vancomycin, and Mustard oil [allyl isothiocyanate]    Social History:    Social History     Tobacco Use    Smoking status: Former Smoker     Packs/day: 0.50     Years: 2.00     Pack years: 1.00     Quit date: 2005     Years since quittin.4    Smokeless tobacco: Former User     Quit date: 2005    Tobacco comment: SMOKED OCCASIONALLY UNTIL 8 YEARS AGO   Vaping Use    Vaping Use: Never used   Substance Use Topics    Alcohol use: Yes     Comment: RARELY    Drug use: No       Family History:       Problem Relation Age of Onset    Diabetes Mother     High Blood Pressure Mother     High Cholesterol Mother     Diabetes Father     High Blood Pressure Father     High Cholesterol Father     Stroke Father     High Blood Pressure Sister     High Blood Pressure Maternal Uncle     High Cholesterol Maternal Uncle     High Blood Pressure Maternal Grandmother        Review of Systems    Not obtained      Objective:   /62   Pulse 81   Temp 100 °F (37.8 °C) (Bladder)   Resp (!) 31   Ht 6' 1\" (1.854 m)   Wt 281 lb 8 oz (127.7 kg)   SpO2 94%   BMI 37.14 kg/m²     Data:  CBC:   Recent Labs     22  0609 22  0319 22  0439   WBC 33.1* 29.7* 28.0*   HGB 12.2* 11.3* 10.1*   HCT 35.6* 34.4* 31.3*   MCV 91.2 92.3 95.2    146 151     BMP:   Recent Labs     22  1420 22  0319 22  0439   * 132* 131*   K 4.6 4.7 4.3   CL 97* 98* 97*   CO2 22 21 20*   PHOS 2.9 4.1 3.7   BUN 19 34* 87*   CREATININE 1.5* 2.6* 4.4* LIVER PROFILE:   Recent Labs     01/26/22  0609 01/27/22  0319 01/28/22  0439   * 85* 64*   ALT 52* 23 11   BILIDIR 0.7* 0.5* 0.3   BILITOT 1.1* 0.7 0.5   ALKPHOS 311* 297* 359*     PT/INR:   Recent Labs     01/25/22  0805 01/25/22  0805 01/25/22  1957 01/26/22  0609 01/26/22  0803 01/27/22  0319 01/28/22  0439   PROT 5.9*   < >  --  6.1*  --  5.8* 5.4*   INR 1.52*  --  1.38*  --  1.35*  --   --     < > = values in this interval not displayed. HgBA1c:  Lab Results   Component Value Date    LABA1C 10.6 01/23/2022       Cultures: blood - MSSA    Wound - MSSA    Imaging: xray left foot -   Remote surgical history of amputation at the 1st and 2nd digits. Margins of   the remaining 1st metatarsal are smooth with slight bony remodeling following   prior surgery. Margins of the remaining 2nd metatarsal are also smooth with   heterotopic bone and fusion of fragments at the base of the previously   remaining proximal phalanx. Subtle erosions are seen at the 3rd MTP joint   involving the base of the phalanx and the distal head of the 3rd metatarsal.   There is severe soft tissue swelling at the prior surgical site. Questionable pattern of subcutaneous gas along the lateral aspect of the left   foot adjacent to the shaft of the 5th metatarsal.  There is severe   disorganization of bone at the tarsometatarsal joints of the 2nd through 5th   digits. Impression:  1. Remote history of amputation at the 1st and 2nd digits. No evidence of   osteomyelitis at prior resection site. 2. Subtle erosions at the 3rd MTP joint are new. This is adjacent to soft   tissue swelling at the prior amputation site. A new focus of osteomyelitis   is suspected. 3. Soft tissue swelling and questionable subcutaneous gas along the lateral   aspect of the left foot. There is also severe disorganization of bone at the   2nd through 5th tarsometatarsal joints.   Although pattern may represent   Charcot arthropathy due to the more diffuse appearance, areas of   osteomyelitis cannot be excluded with plain film. Correlate with physical   exam and clinical workup. A follow-up MRI may be helpful for further   evaluation. Physical Exam:    DP/PT palpable bilateral  Right foot - no open lesions noted. No pressure lesions noted. Left foot - Ulcer on the dorsal midfoot which with compression reveals serosanguinous drainage mixed with purulence. No malodor noted. No crepitus noted. + edema of the foot. Mild erythema periwound. No increase in skin temperature noted. Ecchymosis of the dorsal midfoot noted. Probes to bone of metatarsal. + necrotic/infected fascia expressed with compression. Plantarly the foot does not reveal any signs of infection. Prior amputation hallux and 2nd digit  Rocker bottom foot deformity noted left. Assessment:  Patient Active Problem List   Diagnosis Code    Hypertension I10    Uncontrolled type 2 diabetes mellitus with diabetic polyneuropathy ([facility]) E11.42, E11.65    Cardiomyopathy ([facility] I42.9    Mixed hyperlipidemia E78.2    Hyperglycemia R73.9    Allergic rhinitis J30.9    Osteoarthritis M19.90    Acute renal failure ([facility]) N17.9    MSSA bacteremia R78.81, B95.61    Third degree burn of left hand T23.302A    Syncope R55    Acute hypoxemic respiratory failure ([facility]) J96.01    Septic shock ([facility]) A41.9, R65.21    Cardiopulmonary arrest ([facility]) I46.9    Abscess of left foot L02.612    Hypertriglyceridemia E78.1     Abscess left foot  diabetic foot ulcer left secondary to peripheral neuropathy   diabetes mellitus uncontrolled  Bacteremia ([facility])  Charcot arthropathy left foot      Plan  Patient examined. Reviewed labs and imaging. Antibiotics as per Dr. Sera Hazel. Seen in conjunction with him today. Compression of the forefoot to drain abscess. Overall less drainage today. Packed foot with iodoform and dry dressing. When more stable may require further imaging with MRI.  Given Charcot arthropathy imaging may not be able to confirm or deny osteomyelitis of the midfoot. MRI will allow for better evaluation of soft tissues. I would expect him to require long term treatment for osteomyelitis given purulence has been sitting around the midfoot bones. Will follow.          Electronically signed by Jackson Burton DPM on 1/28/2022 at 7:58 AM.

## 2022-01-28 NOTE — FLOWSHEET NOTE
Treatment time: 3.5 hours  Net UF: 1000 ml    Pre weight: 128.3 kg   Post weight: 127.8 kg  EDW: TBD kg    Access used: Right femoral NTDC  Access function: Ok with -350 ml/min    Medications or blood products given: 25% albumin once    Regular outpatient schedule: RODRICK    Summary of response to treatment: Pt tolerated ok with HD, albumin given once at the beginning of HD, Pt's sBP remained > 100 after albumin given. HD completed in full, heparin dwell in NTDC, capped and clamped. Cirt Line: Initial Hct: 30.7;   End Profile : A; Refill ( Hct.1 -32.1  subtract Hct 2- 31.5): 0.6 ( ye Refill); BV: -2.7 %      Copy of dialysis treatment record placed in chart, to be scanned into EMR.     01/28/22 1347 01/28/22 1730   Vital Signs   /72 128/76   Temp 100 °F (37.8 °C) 100.3 °F (37.9 °C)   Pulse 79 71   Resp (!) 32 28   SpO2 94 % 95 %   Weight 282 lb 13.6 oz (128.3 kg) 281 lb 12 oz (127.8 kg)   Weight Method Actual;Bed scale Actual;Bed scale   Percent Weight Change 0.48 -0.39   Post-Hemodialysis Assessment   Post-Treatment Procedures  --  Blood returned;Catheter capped, clamped and heparinized x 2 ports   Machine Disinfection Process  --  Acid/Vinegar Clean;Bleach; Exterior Machine Disinfection   Rinseback Volume (ml)  --  400 ml   Total Liters Processed (l/min)  --  63 l/min   Dialyzer Clearance  --  Moderately streaked   Duration of Treatment (minutes)  --  210 minutes   Heparin amount administered during treatment (units)  --  0 units   Hemodialysis Intake (ml)  --  500 ml   Hemodialysis Output (ml)  --  1500 ml   NET Removed (ml)  --  1000 ml   Tolerated Treatment  --  Good

## 2022-01-28 NOTE — PROGRESS NOTES
Progress Note    HISTORY     CC:  AMS          We are following for acute kidney injury       Subjective/   HPI:  Patient remains in the ICU in critical condition. Off CRRT. Off pressors. Not making any urine despite 100 mg IV lasix. ROS:  Constitutional:  No fevers, hypothermic   Respiratory: On vent  :  Anuric     Social Hx:  No family at the bedside     Past Medical and Surgical History:  - Reviewed, no changes     EXAM       Objective/     Vitals:    01/28/22 0700 01/28/22 0800 01/28/22 0808 01/28/22 0900   BP: 127/62 (!) 152/76  121/76   Pulse: 81 92 91 87   Resp: (!) 31 (!) 33 (!) 33 (!) 41   Temp: 100 °F (37.8 °C)      TempSrc: Bladder      SpO2: 94% 93% 93% 90%   Weight:       Height:         24HR INTAKE/OUTPUT:      Intake/Output Summary (Last 24 hours) at 1/28/2022 8087  Last data filed at 1/28/2022 8728  Gross per 24 hour   Intake 3266.85 ml   Output 46 ml   Net 3220.85 ml     Constitutional:  Critically ill   Eyes:  Pupils reactive, sclera clear   Neck:  Normal thyroid, no masses   Cardiovascular:  Regular, no rub  Respiratory: On vent, no wheezing  Psychiatry:  Sedated on vent   Abdomen: +bs, soft, nt, no masses   Musculoskeletal: No LE edema, no clubbing   Lymphatics:  No LAD in neck, no supraclavicular nodes       MEDICAL DECISION MAKING       Data/  Recent Labs     01/26/22  0609 01/27/22  0319 01/28/22  0439   WBC 33.1* 29.7* 28.0*   HGB 12.2* 11.3* 10.1*   HCT 35.6* 34.4* 31.3*   MCV 91.2 92.3 95.2    146 151     Recent Labs     01/26/22  1420 01/27/22  0319 01/28/22  0439   * 132* 131*   K 4.6 4.7 4.3   CL 97* 98* 97*   CO2 22 21 20*   GLUCOSE 208* 326* 391*   PHOS 2.9 4.1 3.7   MG 2.40 2.70* 2.80*   BUN 19 34* 87*   CREATININE 1.5* 2.6* 4.4*   LABGLOM 51* 27* 15*   GFRAA >60 33* 18*       Assessment/     RODRICK likely related to prerenal factors in the setting of sepsis, use of diuretics and losartan. UA is not suggestive of glomerulonephritis.   Patient did not respond to IV fluid and developed acute pulmonary edema and renal replacement therapy initiated on 1/23/22     -Acute pulmonary edema              Status post intubation     -Acute hypoxic respiratory failure     -cardio respiratory arrest      -Sepsis with MSSA bacteremia     -Morbid obesity     -Diabetes, uncontrolled    Plan/     HD today  UF 1 kg  Follow labs  Does not appear he will quickly recover so likely to need TDC placed next week    -----------------------------  Billy Sargent M.D.   [facility]

## 2022-01-28 NOTE — PROGRESS NOTES
01/27/22 2304   Vent Information   Vent Type 980   Vent Mode AC/VC   Vt Ordered 430 mL   Rate Set 22 bmp   Peak Flow 70 L/min   Pressure Support 0 cmH20   FiO2  45 %   SpO2 95 %   SpO2/FiO2 ratio 211.11   Sensitivity 3   PEEP/CPAP 5   I Time/ I Time % 0 s   Humidification Source Heated wire   Humidification Temp 3   Humidification Temp Measured 37   Circuit Condensation Drained   Vent Patient Data   High Peep/I Pressure 0   Peak Inspiratory Pressure 23 cmH2O   Mean Airway Pressure 11 cmH20   Rate Measured 25 br/min   Vt Exhaled 772 mL   Minute Volume 12.1 Liters   I:E Ratio 1:3.00   Plateau Pressure 16 [id number]   Cough/Sputum   Sputum How Obtained Endotracheal;Suctioned   Cough Productive   Sputum Amount Small   Sputum Color Creamy   Tenacity Thick   Spontaneous Breathing Trial (SBT) RT Doc   Pulse 63   Breath Sounds   Right Upper Lobe Diminished   Right Middle Lobe Diminished   Right Lower Lobe Diminished   Left Upper Lobe Diminished   Left Lower Lobe Diminished   Additional Respiratory  Assessments   Resp 16   Position Semi-Springer's   Alarm Settings   High Pressure Alarm 40 cmH2O   Low Minute Volume Alarm 3 L/min   Apnea (secs) 20 secs   High Respiratory Rate 45 br/min   Low Exhaled Vt  300 mL   ETT (adult)   Placement Date: 01/23/22   Preoxygenation: Yes  Technique: Direct laryngoscopy  Type: Cuffed  Tube Size: 7.5 mm  Insertion attempts: 1  Secured at: 24 cm  Measured From: Lips   Secured at 24 cm   Measured From Lips   ET Placement Left  (dereased to left)   Secured By Commercial tube wheeler   Site Condition Dry

## 2022-01-28 NOTE — PROGRESS NOTES
[facility] Infectious Disease Progress Note      Darwin Schneider     : 1975    DATE OF VISIT:  2022  DATE OF ADMISSION:  2022       Subjective:     Darwin Schneider is a 42 y.o. male whom I've been seeing for an MSSA left foot abscess, presumed osteomyelitis, bacteremia and (now resolved) septic shock, but complicated by multiorgan failure and cardiac arrest.    Since I last saw him, not a lot of clinical change. Remains sedated, on the vent, but overbreathing, FIO2 stable. Remains on heparin. Off pressors. Still very hyperglycemic. Nearly anuric. HD scheduled for today, I believe. No fevers. Mr. Krista Rolle has a past medical history of Acute osteomyelitis of right hallux ([address] Cellulitis of left foot, CHF (congestive heart failure) ([address] Chronic osteomyelitis of left foot ([address] Closed displaced fracture of distal phalanx of right little finger, Clostridium difficile infection, Diabetic ulcer of left forefoot associated with type 2 diabetes mellitus, with necrosis of bone ([address] Diabetic ulcer of right great toe associated with type 2 diabetes mellitus, with necrosis of bone ([address] Failed soft 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 toe amputation site, History of hyperbaric oxygen therapy, Migraine, Possible perforated tympanic membrane, Post-op hematoma of left foot, Recurrent otitis media, Tear of medial meniscus of left knee, Tobacco use, and Toe osteomyelitis, left ([address]     Current Facility-Administered Medications: ceFAZolin (ANCEF) 2000 mg in sterile water 20 mL IV syringe, 2,000 mg, IntraVENous, Q24H  ceFAZolin (ANCEF) 1,000 mg in dextrose 5 % 50 mL IVPB (mini-bag), 1,000 mg, IntraVENous, Daily PRN  hydrocortisone sodium succinate PF (SOLU-CORTEF) injection 50 mg, 50 mg, IntraVENous, Q12H  dexmedetomidine (PRECEDEX) 400 mcg in sodium chloride 0.9 % 100 mL infusion, 0.2-2 mcg/kg/hr, IntraVENous, Continuous  insulin glargine (LANTUS) injection vial 40 Units, 40 Units, SubCUTAneous, BID  mupirocin (BACTROBAN) 2 % ointment, , Nasal, BID  insulin lispro (HUMALOG) injection vial 0-18 Units, 0-18 Units, SubCUTAneous, Q4H  midazolam (VERSED) injection 2 mg, 2 mg, IntraVENous, Q1H PRN  midazolam (VERSED) 1 mg/mL in D5W infusion, 1-10 mg/hr, IntraVENous, Continuous  heparin (porcine) injection 1,300 Units, 1,300 Units, IntraCATHeter, PRN  famotidine (PEPCID) injection 20 mg, 20 mg, IntraVENous, Daily  heparin (porcine) injection 4,000 Units, 4,000 Units, IntraVENous, PRN  heparin (porcine) injection 2,000 Units, 2,000 Units, IntraVENous, PRN  heparin (porcine) 25,000 Units in dextrose 5 % 250 mL infusion, 1,400 Units/hr, IntraVENous, Continuous  sodium chloride flush 0.9 % injection 5-40 mL, 5-40 mL, IntraVENous, 2 times per day  sodium chloride flush 0.9 % injection 5-40 mL, 5-40 mL, IntraVENous, PRN  0.9 % sodium chloride infusion, 25 mL, IntraVENous, PRN  acetaminophen (TYLENOL) tablet 650 mg, 650 mg, Oral, Q6H PRN **OR** acetaminophen (TYLENOL) suppository 650 mg, 650 mg, Rectal, Q6H PRN  glucose (GLUTOSE) 40 % oral gel 15 g, 15 g, Oral, PRN  glucagon (rDNA) injection 1 mg, 1 mg, IntraMUSCular, PRN  dextrose 5 % solution, 100 mL/hr, IntraVENous, PRN  dextrose bolus (hypoglycemia) 10% 125 mL, 125 mL, IntraVENous, PRN **OR** dextrose bolus (hypoglycemia) 10% 250 mL, 250 mL, IntraVENous, PRN  fentaNYL (SUBLIMAZE) 1,000 mcg in sodium chloride 0.9% 100 mL infusion, 12.5-200 mcg/hr, IntraVENous, Continuous  norepinephrine (LEVOPHED) 16 mg in dextrose 5 % 250 mL infusion, 2-100 mcg/min, IntraVENous, Continuous  chlorhexidine (PERIDEX) 0.12 % solution 15 mL, 15 mL, Mouth/Throat, BID     This is day 6 of Ancef, stress-dose steroids tapered to q12. Allergies: Januvia [sitagliptin], Metformin and related, Vancomycin, and Mustard oil [allyl isothiocyanate]    Pertinent items from the review of systems are discussed in the HPI; the remainder of the ROS was reviewed and is negative.      Objective:     Vital signs over the last 24 hours:  Temp  Av.5 °F (36.9 °C)  Min: 96.7 °F (35.9 °C)  Max: 99.8 °F (37.7 °C)  Pulse  Av.3  Min: 63  Max: 99  Systolic (89XVD), LFA:612 , Min:98 , JXS:462   Diastolic (20RRW), SW, Min:52, Max:95  Resp  Av.6  Min: 15  Max: 36  SpO2  Av.5 %  Min: 91 %  Max: 100 %    Constitutional:  well-developed, well-nourished, sedated, unresponsive, on the vent, slightly diaphoretic  Psychiatric:  Unable to assess   Eyes:  pupils equal, round, reactive to light; sclerae anicteric, conjunctivae not pale, no subconj bleed  ENT:  atraumatic; no labial ulcers; orally intubated  Resp:  lungs I think clearer to auscultation BL, decreased at the bases  Cardiovascular:  heart regular, tachy, decreased heart sounds, no murmur able to be heard; generally mild extremity edema, mild-mod LLE, which is still warmer than the right; no IV phlebitis; right IJ CVC in place, and a right femoral CRRT line, a couple of peripherals  GI:  abdomen soft, NT, mildly distended, hypoactive bowel sounds, no palpable masses or organomegaly  : Munoz in place, small amount of bloody urine  Musculoskeletal:  no clubbing, cyanosis or petechiae; extremities with no gross effusions or acute arthritis, I think stable Charcot deformity overall, but he probably has (abnormally) more left midfoot ROM than he had last year  Skin: warm, dry, normal turgor; no acute rash, no peripheral stigmata of endocarditis. Right lower leg changes chronic and stable; left hand not examined today. Left foot still swollen, warm, not as much erythema, but a developing area of dorsal midfoot ecchymosis and at leat partial thickness skin necrosis. Packing removed from the I&D site with Dr. Moran Second - definitely a bit less drainage able to be expressed today, purulent, not as bloody, I think mostly from the spaces along the 4th met; still a bit of fascial necrosis seen, bone able to be probed.    ______________________________    Recent Labs     22  [facility] 01/27/22  0319 01/26/22  0609   WBC 28.0* 29.7* 33.1*   HGB 10.1* 11.3* 12.2*   HCT 31.3* 34.4* 35.6*   MCV 95.2 92.3 91.2    146 168     Lab Results   Component Value Date    CREATININE 4.4 (H) 01/28/2022     Lab Results   Component Value Date    LABALBU 2.1 (L) 01/28/2022     Lab Results   Component Value Date    ALT 11 01/28/2022    AST 64 (H) 01/28/2022    ALKPHOS 359 (H) 01/28/2022    BILITOT 0.5 01/28/2022      Lab Results   Component Value Date    LABA1C 10.6 01/23/2022     Other recent pertinent labs: Anion gap 14. Glucoses 200s - 400s. Alk phos worse, but transaminases and bili are better. WBC doff down to 4% immature neutrophils.   ______________________________    Recent pertinent micro results:  Initial BCx, left foot WCx, RCx (+) MSSA. Repeat BCx (-) x 2.  ______________________________    Recent imaging results (last 7 days):     XR FOOT LEFT (2 VIEWS)    Result Date: 1/24/2022  1. Remote history of amputation at the 1st and 2nd digits. No evidence of osteomyelitis at prior resection site. 2. Subtle erosions at the 3rd MTP joint are new. This is adjacent to soft tissue swelling at the prior amputation site. A new focus of osteomyelitis is suspected. 3. Soft tissue swelling and questionable subcutaneous gas along the lateral aspect of the left foot. There is also severe disorganization of bone at the 2nd through 5th tarsometatarsal joints. Although pattern may represent Charcot arthropathy due to the more diffuse appearance, areas of osteomyelitis cannot be excluded with plain film. Correlate with physical exam and clinical workup. A follow-up MRI may be helpful for further evaluation. XR CHEST PORTABLE    Result Date: 1/27/2022  Patchy airspace disease, left greater than right which may represent atelectasis or pneumonia. XR CHEST PORTABLE    Result Date: 1/26/2022  Stable support apparatus.  Increasing bilateral airspace opacities which may be related to edema and/or pneumonia. XR CHEST PORTABLE    Result Date: 1/25/2022  Low lung volumes/poor inspiratory effort limiting the study. No significant improvement. A mild cardiomegaly. Mild congestion and/or infiltrates identified in the lungs. No pneumothorax. XR CHEST PORTABLE    Result Date: 1/25/2022  Status post advancement of right internal jugular central line with distal tip now visualized near region of junction of superior vena cava and right atrium. No evidence of pneumothorax. XR CHEST PORTABLE    Result Date: 1/24/2022  Low lung volumes with cardiomegaly and vascular congestion, as well as patchy airspace disease bilaterally, similar to the previous exam.     XR CHEST PORTABLE    Result Date: 1/23/2022  Improved lung volumes. Bilateral perihilar opacification, edema versus infiltrate. Satisfactory position of endotracheal tube. Central line tip in either the distal brachiocephalic vein or proximal SVC. XR CHEST PORTABLE    Result Date: 1/23/2022  Cardiomegaly with left mid and lower lung infiltrates. Support tubes as described above. XR CHEST PORTABLE    Result Date: 1/22/2022  Low lung volumes. No acute cardiopulmonary disease. XR CHEST 1 VIEW    Result Date: 1/23/2022  Limited chest as outlined above. Bilateral perihilar opacification, edema versus infiltrate.   ___________________    Today's CXR read as having increased pulmonary edema; I agree, looks more like Wednesday's CXR again.       Assessment:     Patient Active Problem List   Diagnosis Code    Hypertension I10    Uncontrolled type 2 diabetes mellitus with diabetic polyneuropathy ([address] E11.42, E11.65    Cardiomyopathy ([address] I42.9    Mixed hyperlipidemia E78.2    Hyperglycemia R73.9    Allergic rhinitis J30.9    Osteoarthritis M19.90    Acute renal failure (HCC) N17.9    MSSA bacteremia R78.81, B95.61    Third degree burn of left hand T23.302A    Syncope R55    Acute hypoxemic respiratory failure ([address] J96.01    Septic shock ([facility]) A41.9, R65.21    Cardiopulmonary arrest ([facility] I46.9    Abscess of left foot L02.612    Hypertriglyceridemia E78.1     Assessment of today's active condition(s):      --          Background of uncontrolled DM2, neuropathy, no known PAD, multiple prior diabetic foot ulcers, infections, surgeries. Most recent wounds were at the left 1st and 2nd ray, but they had been healed for just about a year.      --          Admission this time with a fairly nonfocal sepsis syndrome, at least in terms of symptoms, complicated by shock, acute renal failure, lactic acidosis, then cardiac arrest, resuscitation, acute respiratory failure, rapid Afib. Currently with vent support, sedation, heparin, right now off vasopressors, off insulin drip, and off CRRT. Found to have MSSA bacteremia, and a sizeable MSSA foot abscess. Based on how far I'm able to probe, I do have a high suspicion of active osteomyelitis in the foot, though those XR changes are very hard to determine osteo from Charcot changes alone.     --          Systemically, things at least seem slightly improved over the last 48 hours, at least in terms of BP and WBC count, but more fluid overload, with CRRT stopped, and glucoses uncontrolled since insulin drip stopped.       --          Third degree burn of left hand, but no strong signs of infection there, at least nothing deep. An acute Staph aureus endocarditis could still be a possibility, but the foot definitely seems the source of the initial bacteremia, and that's not shown to be sustained. TTE does not have great sensitivity for endocarditis, but my clinical suspicion isn't high.     --          Multifactorial leukocytosis with impressive left shift -- I think from the abscess (perhaps still not totally drained), the bacteremia, the stress of acute shock and organ failure, and the effect of steroids. WBC and left shift just a bit better today.  CK and LFTs improving, which is encouraging too. Not sure if that increased alk phos is cholestatic or bony; I WOULD be a bit worried if it's the latter, despite ABx. Treatment recs:     Left foot re-packed with iodoform, then 4x4s and Kerlix - will see if the ICU RNs can change that over the weekend, unless Dr. Crystal Fonseca is here to see patients. Continue Ancef. Keep current dose as long as he is getting intermittent HD; if he goes back to CRRT for some reason, go back to 2gm q12. Current plan is a prolonged course of IV Abx, for osteo. Will be needing surgery at some point, to better explore and debride that foot. Might best be done after we can get some better imaging (MRI), and also when he's a bit more stable from a cardiopulmonary standpoint; next week? Continue same daily local care for the left hand; I'll take another look at that on Monday. D/W Dr. Crystal Fonseca. I was not necessarily going to be in the hospital over the weekend to see Mr. Sarah Bo. Will keep an eye on Epic from home.  Please call or text if there are any urgent concerns over the weekend with his clinical course, test results, etc.    Watch for Cdiff, allergic reaction, Candidiasis, line infection, etc.     Electronically signed by Gala Rodriguez MD on 1/28/2022 at 7:07 AM.

## 2022-01-28 NOTE — PROGRESS NOTES
FiO2 decraesed to 40%         01/28/22 1520   Vent Information   Vt Ordered 430 mL   Rate Set 22 bmp   Peak Flow 100 L/min   Pressure Support 0 cmH20   FiO2  40 %   SpO2 95 %   SpO2/FiO2 ratio 237.5   Sensitivity 3   PEEP/CPAP 5   I Time/ I Time % 0 s   Humidification Source Heated wire   Humidification Temp Measured 37   Vent Patient Data   High Peep/I Pressure 0   Peak Inspiratory Pressure 28 cmH2O   Mean Airway Pressure 9.19 cmH20   Rate Measured 30 br/min   Vt Exhaled 475 mL   Minute Volume 14.1 Liters   I:E Ratio 1:3.30   Cough/Sputum   Sputum How Obtained None   Spontaneous Breathing Trial (SBT) RT Doc   Pulse 81   Breath Sounds   Right Upper Lobe Diminished   Right Middle Lobe Diminished   Right Lower Lobe Diminished   Left Upper Lobe Diminished   Left Lower Lobe Diminished   Additional Respiratory  Assessments   Resp (!) 31   Position Reverse Trendelenburg   Oral Care Completed? Yes   Oral Care Mouth suctioned   Subglottic Suction Done?  Yes   Alarm Settings   High Pressure Alarm 45 cmH2O   Low Minute Volume Alarm 3 L/min   High Respiratory Rate 45 br/min   ETT (adult)   Placement Date: 01/23/22   Preoxygenation: Yes  Technique: Direct laryngoscopy  Type: Cuffed  Tube Size: 7.5 mm  Insertion attempts: 1  Secured at: 24 cm  Measured From: Lips   Secured at 24 cm   Measured From [address] [employer] tube wheeler   Site Condition Dry

## 2022-01-28 NOTE — PROGRESS NOTES
8:36 PM  Shift assessment completed, see flow sheet. Intubated, SpO2 95% on 45% FiO2 & 5 PEEP. PEEP decreased from 8 to 5 per RT at this time. Sedated with fentanyl at 125 mcg/hr, precedex at 0.6 mcg/kg/hr, and versed at 3 mg/hr. Versed decreased from 4 to 3 at this time. RASS -3 and CPOT 0 with occasional double stacking.   Munoz catheter in place draining brown, cloudy urine. UO minimal and blood tinged which is new. OG in place at 64 with TF infusing at goal rate, GRV 10. CVC & PIVs in place and functioning appropriately, dressings C/D/I. Restraints in use for pt safety.   Repositioning pt q2h and PRN, oral care completed at this time. 1:23 AM  Reassessment completed, see flow sheet. Vent settings unchanged, SpO2 95%. Versed titrated off, fentanyl down to 100, and precedex up to 0.7, PRN versed given for increased asynchrony after physical stimulation. CHG bath & linen change completed. Sacrum mepilex replaced. 4:52 AM  Reassessment completed, see flow sheet. Vent settings & sedation unchanged. TF infusing w/o issue, GRV 45.  Labs collected from CVC w/o issue. ABG drawn from left radial artery x1 attempt. Modified vitor's test positive & pulse present. Pressure held to for five minutes, no hematoma present. Assessment unchanged otherwise. Pt repositioned. 7:14 AM  EOS report given to Crystal Lucio RN. Pt stable at this time, care transferred.

## 2022-01-28 NOTE — PROGRESS NOTES
Pulmonary & Critical Care Medicine ICU Progress Note    CC: Hypoxemic respiratory failure    Events of Last 24 hours: increased tachypnea and hypoxia off of sedation. Not following commands or having purposeful movement. .     Heparin drip   Fentanyl 100 mcg/hr   Precedex    Vascular lines: IV:   Right IJ 1/23  Right femoral Vas-Cath 1/23    MV: 1/23  Vent Mode: AC/VC Rate Set: 22 bmp/Vt Ordered: 430 mL/ /FiO2 : 45 %  Recent Labs     01/27/22  0319 01/28/22  0439   PHART 7.425 7.399   LLA0BFK 33.2* 31.1*   PO2ART 67.8* 62.3*       IV:   dexmedetomidine HCl in NaCl 0.7 mcg/kg/hr (01/28/22 0659)    midazolam Stopped (01/27/22 2352)    heparin 25,000 units in dextrose 5% 250 mL infusion 1,600 Units/hr (01/28/22 0836)    sodium chloride 5 mL/hr at 01/28/22 0659    dextrose      fentaNYL 100 mcg/hr (01/28/22 0659)    norepinephrine Stopped (01/26/22 2254)       Vitals:  Blood pressure 127/62, pulse 91, temperature 100 °F (37.8 °C), temperature source Bladder, resp. rate (!) 33, height 6' 1\" (1.854 m), weight 281 lb 8 oz (127.7 kg), SpO2 93 %. on vent    Intake/Output Summary (Last 24 hours) at 1/28/2022 0839  Last data filed at 1/28/2022 0659  Gross per 24 hour   Intake 3266.85 ml   Output 46 ml   Net 3220.85 ml     General: ill appearing. Intubated sedated. Eyes: PERRL. No sclera icterus. No conjunctival injection. ENT: No discharge. Pharynx clear. ET tube in place  Neck: Trachea midline. Normal thyroid. Resp: No accessory muscle use. Few crackles. No wheezing. Few rhonchi. CV: Regular rate. irregular rhythm. No mumur or rub. 1-2+ LE edema. GI: Non-tender. Non-distended. No masses. M/S: No cyanosis. No joint deformity. No clubbing. Neuro: Intubated sedated. No response to painful stimuli. Not following commands.    Psych: Noncommunicative unable to obtain    Scheduled Meds:   ceFAZolin 2000 mg in 20 mL SWFI IV Syringe  2,000 mg IntraVENous Q24H    hydrocortisone sodium succinate PF  50 mg IntraVENous Q12H    insulin glargine  40 Units SubCUTAneous BID    mupirocin   Nasal BID    insulin lispro  0-18 Units SubCUTAneous Q4H    famotidine (PEPCID) injection  20 mg IntraVENous Daily    sodium chloride flush  5-40 mL IntraVENous 2 times per day    chlorhexidine  15 mL Mouth/Throat BID       Data:  CBC:   Recent Labs     01/26/22  0609 01/27/22  0319 01/28/22  0439   WBC 33.1* 29.7* 28.0*   HGB 12.2* 11.3* 10.1*   HCT 35.6* 34.4* 31.3*   MCV 91.2 92.3 95.2    146 151     BMP:   Recent Labs     01/26/22  1420 01/27/22  0319 01/28/22  0439   * 132* 131*   K 4.6 4.7 4.3   CL 97* 98* 97*   CO2 22 21 20*   PHOS 2.9 4.1 3.7   BUN 19 34* 87*   CREATININE 1.5* 2.6* 4.4*     LIVER PROFILE:   Recent Labs     01/26/22  0609 01/27/22 0319 01/28/22  0439   * 85* 64*   ALT 52* 23 11   BILIDIR 0.7* 0.5* 0.3   BILITOT 1.1* 0.7 0.5   ALKPHOS 311* 297* 359*       Microbiology:  1/22 BC Staphylococcus  1/22 COVID-19 and influenza negative  1/23/22 Blood cx: NGTD  1/23 tracheal aspirate  1/24 Wound cx: MSSA  1/24 BC MSSA    Echo 1/25/22: EF 35%, global HK, reduced RV function    Chest x-ray 1/28/22 imaging was reviewed by me and showed   Increased pulm edema          ASSESSMENT:  · Acute hypoxemic respiratory failure  · Septic shock -resolved  · Anoxicencephalopathy  · MSSA bacteremia  · Hypertriglyceridemia   · L foot abscess drained 1/24/2022  · Cardiopulmonary arrest x2 1/23/2022 required CPR  · Afib- rate controlled   · Cardiomyopathy EF 35% on Echo 1/24  · Acute pulmonary edema/fluid overload  · Acute kidney injury  · Uremia  · DM with hyperglycemia  · Left hand burn  · History of cardiomyopathy-last echo 2014 EF 50%     PLAN:  Mechanical ventilation as per my orders. The ventilator was adjusted by me at the bedside for unstable, life threatening respiratory failure.   Fentanyl and Versed gtt for sedation, target RASS -2, with daily spontaneous awakening trial.  Head of bed 30 degrees or higher at all times  · Daily spontaneous breathing trial once PEEP less than 8, FiO2 less than 55%. · Precedex and Fentanyl gtt  · Wean stress dose steroids off  · Head CT  · IV Ancef -ID following  · Follow cultures  · TTM- rewarmed 8pm 1/25   · Nephrology following and plans for HD today. · Nutrition: Tube feeding to start today   · Lantus 50 BID and ISS   · In NSR - stop heparin gtt  · GI prophylaxis: Pepcid    · DVT prophylaxis: SQ heparin   · Total critical care time caring for this patient with life threatening, unstable organ failure, including direct patient contact, management of life support systems, review of data including imaging and labs, discussions with other team members and physicians is 32 minutes, excluding procedures. No significant past surgical history

## 2022-01-28 NOTE — PROGRESS NOTES
Reassessment complete. Patient continues intubated and sedated. Physical assessment unchanged. Patient currently receiving dialysis treatment. Patient repositioned for comfort. No s/s of distress noted at this time. Bilateral soft wrist restraints remain in place for patient safety.

## 2022-01-28 NOTE — PROGRESS NOTES
SpO2 90%   BMI 37.14 kg/m²       Gen: intubated  Eyes: PERRL. No sclera icterus. No conjunctival injection. ENT: No discharge. Pharynx clear. Neck: Trachea midline. Normal thyroid. Resp:+ accessory muscle use. + crackles. No wheezes. No rhonchi. CV: INCREASED rate. Regular rhythm. No murmur or rub. No edema. GI: Non-tender. Non-distended. No masses. No organomegaly. Normal bowel sounds. No hernia. Skin: burns left hand with some yellow slough. Chronic skin changes both legs.   Lymph: No cervical LAD. No supraclavicular LAD. M/S: No cyanosis. No joint deformity. No clubbing. Missing right big toe  Neuro: intubated, cannot be assessed  Psych: intubated, cannot be assessed    Labs:   Recent Labs     01/26/22  0609 01/27/22 0319 01/28/22 0439   WBC 33.1* 29.7* 28.0*   HGB 12.2* 11.3* 10.1*   HCT 35.6* 34.4* 31.3*    146 151     Recent Labs     01/26/22  1420 01/27/22  0319 01/28/22  0439   * 132* 131*   K 4.6 4.7 4.3   CL 97* 98* 97*   CO2 22 21 20*   BUN 19 34* 87*   CREATININE 1.5* 2.6* 4.4*   CALCIUM 7.8* 8.2* 7.5*   PHOS 2.9 4.1 3.7     Recent Labs     01/26/22  0609 01/27/22 0319 01/28/22 0439   * 85* 64*   ALT 52* 23 11   BILIDIR 0.7* 0.5* 0.3   BILITOT 1.1* 0.7 0.5   ALKPHOS 311* 297* 359*     Recent Labs     01/25/22  1957 01/26/22  0803   INR 1.38* 1.35*     No results for input(s): Devon Brannon in the last 72 hours. Urinalysis:      Lab Results   Component Value Date    NITRU Negative 01/22/2022    WBCUA 0-2 01/22/2022    BACTERIA Rare 01/22/2022    RBCUA 0-2 01/22/2022    BLOODU TRACE-LYSED 01/22/2022    SPECGRAV >=1.030 01/22/2022    GLUCOSEU 500 01/22/2022       Radiology:  XR CHEST PORTABLE   Final Result   CHF with increasing pulmonary edema. XR CHEST PORTABLE   Final Result   Patchy airspace disease, left greater than right which may represent   atelectasis or pneumonia. XR CHEST PORTABLE   Final Result   Stable support apparatus. Increasing bilateral airspace opacities which may be related to edema and/or   pneumonia. XR CHEST PORTABLE   Final Result   Low lung volumes/poor inspiratory effort limiting the study. No significant improvement. A mild cardiomegaly. Mild congestion and/or   infiltrates identified in the lungs. No pneumothorax. XR FOOT LEFT (2 VIEWS)   Final Result   1. Remote history of amputation at the 1st and 2nd digits. No evidence of   osteomyelitis at prior resection site. 2. Subtle erosions at the 3rd MTP joint are new. This is adjacent to soft   tissue swelling at the prior amputation site. A new focus of osteomyelitis   is suspected. 3. Soft tissue swelling and questionable subcutaneous gas along the lateral   aspect of the left foot. There is also severe disorganization of bone at the   2nd through 5th tarsometatarsal joints. Although pattern may represent   Charcot arthropathy due to the more diffuse appearance, areas of   osteomyelitis cannot be excluded with plain film. Correlate with physical   exam and clinical workup. A follow-up MRI may be helpful for further   evaluation. XR CHEST PORTABLE   Final Result   Low lung volumes with cardiomegaly and vascular congestion, as well as patchy   airspace disease bilaterally, similar to the previous exam.         XR CHEST PORTABLE   Final Result   Status post advancement of right internal jugular central line with distal   tip now visualized near region of junction of superior vena cava and right   atrium. No evidence of pneumothorax. XR CHEST PORTABLE   Final Result   Improved lung volumes. Bilateral perihilar opacification, edema versus   infiltrate. Satisfactory position of endotracheal tube. Central line tip in either the   distal brachiocephalic vein or proximal SVC. XR CHEST PORTABLE   Final Result   Cardiomegaly with left mid and lower lung infiltrates. Support tubes as described above. XR CHEST 1 VIEW   Final Result   Limited chest as outlined above. Bilateral perihilar opacification, edema   versus infiltrate. XR CHEST PORTABLE   Final Result   Low lung volumes. No acute cardiopulmonary disease. XR CHEST PORTABLE    (Results Pending)   CT HEAD WO CONTRAST    (Results Pending)           Assessment/Plan:    Active Hospital Problems    Diagnosis     Hypertriglyceridemia [E78.1]     Abscess of left foot [L02.612]     MSSA bacteremia [R78.81, B95.61]     Third degree burn of left hand [T23.302A]     Syncope [R55]     Acute hypoxemic respiratory failure ([address] [J96.01]     Septic shock (HCC) [A41.9, R65.21]     Cardiopulmonary arrest ([address] [I46.9]     Acute renal failure ([address] [N17.9]     Hyperglycemia [R73.9]     Mixed hyperlipidemia [E78.2]     Cardiomyopathy ([address] [I42.9]     Uncontrolled type 2 diabetes mellitus with diabetic polyneuropathy ([address] [E11.42, E11.65]            #RODRICK.  Patient admitted to hospital with RODRICK.  Normal renal function October 2021.  Patient is suspected to be prerenal/ATN.  Creatinine worsened.  Nephrology consulted. Elizabeth Newman was started on IV fluids  Emergent Vas-Cath placed and CRRT started.  Critical care consulted  CRRT stopped 1/27--> Lasix 100 mg IV x1 given--no response  HD today     #Acute respiratory failure. Suspect patient developed acute pulmonary edema causing acute respiratory failure from renal failure. Intubated 1/23/22.  Pulmonary consulted. Not having purposeful movements or following commands. obtain head CT-negative for acute findings     #H/o cardiomyopathy - check echo with EF 35%, cardio consulted     #S/p PEA arrest 1/23/22     # Left foot abscess - s/p I&D, ID and podiatry consulted, cx sent-Staph aureus     #MSSA bacteremia. MSSA foot abscess. Septic shock. started Ancef.  ID consultation. Elian Polanco reviewed. Currently on Levophed. Epinephrine DC'd. Continue Solu-Cortef. now off levo    A. fib with controlled ventricular rate  On heparin drip     #Diabetes mellitus type 2 uncontrolled.  Insulin.  Continue sliding scale. Lantus 50 units twice daily. monitor sugars.     #Hypertension.  Blood pressure soft.  Hold blood pressure medications.      Heparin gtt for DVT prophylaxis.   Diet: Diet NPO  ADULT TUBE FEEDING; Orogastric; Peptide Based High Protein; Continuous; 20; Yes; 20; Q 4 hours; 90; 30; Q 4 hours  Code Status: Full Code    PT/OT Eval Status: ordered    Sebastian Klein MD

## 2022-01-28 NOTE — CARE COORDINATION
INTERDISCIPLINARY PLAN OF CARE CONFERENCE    Date/Time: 1/28/2022 12:22 PM  Completed by: Swati Borges RN, Case Management      Patient Name:  Juice Shell  YOB: 1975  Admitting Diagnosis: Hyperglycemia [R73.9]  RODRICK (acute kidney injury) ([facility] [N17.9]  Acute renal failure, unspecified acute renal failure type ([facility] [N17.9]  Syncope, unspecified syncope type [R55]     Admit Date/Time:  1/22/2022  1:32 PM    Chart reviewed. Interdisciplinary team contacted or reviewed plan related to patient progress and discharge plans. Disciplines included Case Management, Nursing, and Dietitian. Current Status: ICU LOC, inpatient. Multiiple specialties involved- IM, Pulmonology, Nephrology, Infectious Disease, Podiatry. On Vent. Off sedation- had increased tachypnea and hypoxia. Sedation restarted. Pt failed SBT yesterday. S/p cardiac arrest while inpatient, possibly secondary to sepsis and shock. Pt suffered burn to left hand while welding prior to admission and refused to seek treatment. Also has wounds to bilateral lower legs/foot. Pt has uncontrolled diabetes. Head CT today- Pt with no purposeful movement nor following commands per Pulm note. . ARF requiring CRRT- to have [facility] placed as not making urine at this time despite 100mg lasix. Moses Doreen PT/OT recommendation for discharge plan of care: TBD    Expected D/C Disposition:  TBD  Discharge Plan Comments: will follow pending progress. Pt very ill.     Home O2 in place on admit: No  Pt informed of need to bring portable home O2 tank on day of discharge for nursing to connect prior to leaving:  Not Indicated  Verbalized agreement/Understanding:  Not Indicated

## 2022-01-28 NOTE — PROGRESS NOTES
Reassessment complete. Patient continue intubated and sedated. Physical assessment unchanged at this time. Patient repositioned for comfort. Patient's family member at bedside. Bilateral soft wrist restraints remain in place for patient safety.

## 2022-01-28 NOTE — PROGRESS NOTES
Morning assessment complete. Patient seen intubated and sedated. RASS - 3. Patient over-breathing the ventilator at 36/min, PRN Versed given. Patient intubated with a #7.5 ETT at the 24 LL. Ventilator settings are currently AC 24/430/45/5. Patient with no signs of distress noted at this time. Physical assessment as charted in flow sheets. Scheduled medications given per orders. Fentanyl infusing at 100 mcg//h. Precedex infusing at 0.8 mcg/kg/h. Heparin infusing at 16 mL/hr. Central line dressing is clean, dry and intact with no signs of drainage. All tubing is current and dated. IPA caps applied to all access hubs. Peripheral Iv C/D/I and functioning properly. Munoz intact and secure, no urine draining. OG intact and secure, tube feed running at goal rate. Patient repositioned for comfort. Bilateral soft wrist restraints in place for patient safety and the safety of all lines/tubes.

## 2022-01-28 NOTE — PROGRESS NOTES
01/28/22 0332   Vent Information   Vent Type 980   Vent Mode AC/VC   Vt Ordered 430 mL   Rate Set 22 bmp   Peak Flow 70 L/min   Pressure Support 0 cmH20   FiO2  45 %   SpO2 93 %   SpO2/FiO2 ratio 206.67   Sensitivity 3   PEEP/CPAP 5   I Time/ I Time % 0 s   Humidification Source Heated wire   Humidification Temp 37   Humidification Temp Measured 37   Circuit Condensation Drained   Vent Patient Data   High Peep/I Pressure 0   Peak Inspiratory Pressure 14 cmH2O   Mean Airway Pressure 6.9 cmH20   Rate Measured 32 br/min   Vt Exhaled 450 mL   Minute Volume 14.3 Liters   I:E Ratio 1:1.70   Plateau Pressure 17 FQV24   Cough/Sputum   Sputum How Obtained Endotracheal;Suctioned   Cough Productive   Sputum Amount Small   Sputum Color Creamy   Tenacity Thick   Spontaneous Breathing Trial (SBT) RT Doc   Pulse 78   Breath Sounds   Right Upper Lobe Diminished   Right Middle Lobe Diminished   Right Lower Lobe Diminished   Left Upper Lobe Diminished   Left Lower Lobe Diminished   Additional Respiratory  Assessments   Resp (!) 31   Position Semi-Springer's   Alarm Settings   High Pressure Alarm 40 cmH2O   Low Minute Volume Alarm 3 L/min   Apnea (secs) 20 secs   High Respiratory Rate 45 br/min   Low Exhaled Vt  300 mL   ETT (adult)   Placement Date: 01/23/22   Preoxygenation: Yes  Technique: Direct laryngoscopy  Type: Cuffed  Tube Size: 7.5 mm  Insertion attempts: 1  Secured at: 24 cm  Measured From: Lips   Secured at 24 cm   Measured From Lips   ET Placement Left   Secured By Commercial tube wheeler   Site Condition Dry

## 2022-01-29 NOTE — FLOWSHEET NOTE
Treatment time: 3hrs    Net UF: 1 liter    Pre weight: 131.1kg bedscale  Post weight: 130kg  EDW: TBD    Access used: RFEM Vascath  Access function: Good w/lines reversed. Medications or blood products given: Albumin 12.5gm and Heparin dwells    Regular outpatient schedule: TBD    Summary of response to treatment:      01/29/22 1349   Vital Signs   /76   Temp 101.4 °F (38.6 °C)   Pulse 85   Resp 27   SpO2 95 %   Weight 286 lb 9.6 oz (130 kg)   Weight Method Bed scale   Percent Weight Change -0.84   Pain Assessment   Pain Assessment CPOT   Pain Level 0   Post-Hemodialysis Assessment   Post-Treatment Procedures Blood returned;Catheter capped, clamped and heparinized x 2 ports   Machine Disinfection Process Acid/Vinegar Clean;Bleach; Exterior Machine Disinfection   Rinseback Volume (ml) 400 ml   Total Liters Processed (l/min) 57.3 l/min   Dialyzer Clearance Lightly streaked   Duration of Treatment (minutes) 180 minutes   Heparin amount administered during treatment (units) 0 units   Hemodialysis Intake (ml) 450 ml   Hemodialysis Output (ml) 1450 ml   NET Removed (ml) 1000 ml   Tolerated Treatment Good   Patient Response to Treatment lowest sbp 108. Albumin 12.5gm used for bp support   Bilateral Breath Sounds Diminished   Edema Generalized   Physician Notified? Yes     Copy of dialysis treatment record placed in chart, to be scanned into EMR.

## 2022-01-29 NOTE — PROCEDURES
See History and Physical or progress note for additional findings. Pertinent changes recorded below if present. Pre/post procedure diagnosis: pneumonia    Allergies and medications have been reviewed    HENT: Airway patent and reviewed. ETT in place. Cardiovascular: Normal rate, regular rhythm, normal heart sounds. Pulmonary/Chest: No wheezes. No rhonchi. No rales. Abdominal: Soft. Bowel sounds are normal. No distension. ASA: Class 4 - A patient with an incapacitating systemic disease that is a constant threat to life  Level of Sedation Plan: Continue ICU sedation    Post Procedure Plan   Continue ICU care.   ______________________     The risks and benefits as well as alternatives to the procedure have been discussed with the POA. The  POA understands and agrees to proceed. Signed Consent in chart. PROCEDURE:  BRONCHOSCOPY      The risks and benefits as well as alternatives to the procedure have been discussed with the patient or POA. The patient or POA understands and agrees to proceed. Consent signed. DESCRIPTION OF PROCEDURE: A time out was taken. ICU sedation continued. 2ml 1% lidocaine through bronchoscope. The scope was passed with ease via the ETT. A complete airway inspection was performed. Normal anatomy. No endobronchial lesion was identified. Mucosa appeared inflamed and erythematous. There were thick tan secretions in the ETT, trachea and lower lobes. Therapeutic aspiration completed. Washings were obtained throughout the airways. A Bronchoalveolar lavage was obtained from the LLL with good return. The patient tolerated the procedure well. EBL none.      FOLLOW UP:  Cultures

## 2022-01-29 NOTE — PROGRESS NOTES
01/28/22 2321   Vent Information   Vent Type 980   Vent Mode AC/VC   Vt Ordered 430 mL   Rate Set 22 bmp   Peak Flow 100 L/min   Pressure Support 0 cmH20   FiO2  40 %   SpO2 94 %   SpO2/FiO2 ratio 235   Sensitivity 3   PEEP/CPAP 5   I Time/ I Time % 0 s   Vent Patient Data   High Peep/I Pressure 0   Peak Inspiratory Pressure 29 cmH2O   Mean Airway Pressure 11 cmH20   Rate Measured 29 br/min   Vt Exhaled 431 mL   Minute Volume 13.1 Liters   I:E Ratio 1:3.30   Cough/Sputum   Sputum How Obtained Suctioned;Endotracheal   Sputum Amount Moderate   Sputum Color Creamy   Tenacity Thick   Spontaneous Breathing Trial (SBT) RT Doc   Pulse 87   Breath Sounds   Right Upper Lobe Diminished   Right Middle Lobe Diminished   Right Lower Lobe Diminished   Left Upper Lobe Diminished   Left Lower Lobe Diminished   Additional Respiratory  Assessments   Resp 22   Alarm Settings   High Pressure Alarm 45 cmH2O   Low Minute Volume Alarm 3 L/min   High Respiratory Rate 45 br/min   ETT (adult)   Placement Date: 01/23/22   Preoxygenation: Yes  Technique: Direct laryngoscopy  Type: Cuffed  Tube Size: 7.5 mm  Insertion attempts: 1  Secured at: 24 cm  Measured From: Lips   ET Placement Left

## 2022-01-29 NOTE — PROGRESS NOTES
Pulmonary & Critical Care Medicine ICU Progress Note    CC: Hypoxemic respiratory failure    Events of Last 24 hours:  Not following commands or having purposeful movement. Fever 101. Heparin drip   Fentanyl 125 mcg/hr   Precedex 1    Vascular lines: IV:   Right IJ 1/23  Right femoral Vas-Cath 1/23    MV: 1/23  Vent Mode: AC/VC Rate Set: 22 bmp/Vt Ordered: 430 mL/ /FiO2 : 40 %  Recent Labs     01/28/22 0439 01/29/22  0348   PHART 7.399 7.386   [id number] 31.1* 31.2*   PO2ART 62.3* 64.5*       IV:   dexmedetomidine HCl in NaCl 1 mcg/kg/hr (01/29/22 0542)    midazolam Stopped (01/27/22 2352)    sodium chloride 5 mL/hr at 01/29/22 0431    dextrose      fentaNYL 125 mcg/hr (01/29/22 0431)    norepinephrine Stopped (01/26/22 2254)       Vitals:  Blood pressure 115/68, pulse 83, temperature 101.3 °F (38.5 °C), temperature source Bladder, resp. rate 27, height 6' 1\" (1.854 m), weight 284 lb 2.8 oz (128.9 kg), SpO2 95 %. on vent    Intake/Output Summary (Last 24 hours) at 1/29/2022 0755  Last data filed at 1/29/2022 0600  Gross per 24 hour   Intake 3488.86 ml   Output 1621 ml   Net 1867.86 ml     General: ill appearing. Intubated sedated. Eyes: PERRL. No sclera icterus. No conjunctival injection. ENT: No discharge. Pharynx clear. ET tube in place  Neck: Trachea midline. Normal thyroid. Resp: No accessory muscle use. Few crackles. No wheezing. Few rhonchi. CV: Regular rate. irregular rhythm. No mumur or rub. 1-2+ LE edema. GI: Non-tender. Non-distended. No masses. M/S: No cyanosis. No joint deformity. No clubbing. Neuro: Intubated sedated. No response to painful stimuli. Not following commands.    Psych: Noncommunicative unable to obtain    Scheduled Meds:   heparin (porcine)  5,000 Units SubCUTAneous 3 times per day    insulin glargine  50 Units SubCUTAneous BID    ceFAZolin 2000 mg in 20 mL SWFI IV Syringe  2,000 mg IntraVENous Q24H    mupirocin   Nasal BID    insulin lispro  0-18 Units SubCUTAneous Q4H    famotidine (PEPCID) injection  20 mg IntraVENous Daily    sodium chloride flush  5-40 mL IntraVENous 2 times per day    chlorhexidine  15 mL Mouth/Throat BID       Data:  CBC:   Recent Labs     01/27/22 0319 01/28/22 0439 01/29/22 0347   WBC 29.7* 28.0* 27.9*   HGB 11.3* 10.1* 10.1*   HCT 34.4* 31.3* 30.8*   MCV 92.3 95.2 93.2    151 182     BMP:   Recent Labs     01/27/22 0319 01/28/22 0439 01/29/22 0347   * 131* 130*   K 4.7 4.3 4.2   CL 98* 97* 95*   CO2 21 20* 19*   PHOS 4.1 3.7 4.1   BUN 34* 87* 93*   CREATININE 2.6* 4.4* 4.2*     LIVER PROFILE:   Recent Labs     01/27/22 0319 01/28/22 0439 01/29/22 0347   AST 85* 64* 105*   ALT 23 11 14   BILIDIR 0.5* 0.3 0.5*   BILITOT 0.7 0.5 0.8   ALKPHOS 297* 359* 467*       Microbiology:  1/22 BC Staphylococcus  1/22 COVID-19 and influenza negative  1/23/22 Blood cx: NGTD  1/23 tracheal aspirate  1/24 Wound cx: MSSA  1/24 BC MSSA    Echo 1/25/22: EF 35%, global HK, reduced RV function    Chest x-ray 1/29/22 imaging was reviewed by me and showed   1. Stable lines, tubes, and support devices. 2. Stable cardiopulmonary status after accounting for differences in patient   positioning including bilateral airspace opacities. 3. Cardiomegaly. 4. Low lung volumes. ASSESSMENT:  · Acute hypoxemic respiratory failure  · Septic shock -resolved  · Anoxicencephalopathy  · MSSA bacteremia  · Hypertriglyceridemia   · L foot abscess drained 1/24/2022  · Cardiopulmonary arrest x2 1/23/2022 required CPR  · Afib- rate controlled   · Cardiomyopathy EF 35% on Echo 1/24  · Acute pulmonary edema/fluid overload  · Acute kidney injury  · Uremia  · DM with hyperglycemia  · Left hand burn  · History of cardiomyopathy-last echo 2014 EF 50%     PLAN:  SAT/SBT  Mechanical ventilation as per my orders. The ventilator was adjusted by me at the bedside for unstable, life threatening respiratory failure.   Fentanyl and Versed gtt for sedation, target RASS -2, with daily spontaneous awakening trial.  Head of bed 30 degrees or higher at all times  · Daily spontaneous breathing trial once PEEP less than 8, FiO2 less than 55%. · Precedex and Fentanyl gtt  · Bronch for BAL  · IV Ancef -ID following  · Follow cultures  · TTM- rewarmed 8pm 1/25   · Nephrology following for intermittent HD   · Nutrition: Tube feeding to start today   · Lantus 55 BID and ISS   · In NSR - stop heparin gtt  · GI prophylaxis: Pepcid    · DVT prophylaxis: SQ heparin   · Total critical care time caring for this patient with life threatening, unstable organ failure, including direct patient contact, management of life support systems, review of data including imaging and labs, discussions with other team members and physicians is 32 minutes, excluding procedures.

## 2022-01-29 NOTE — PROGRESS NOTES
Reassessment complete. Patient continues intubated. Physical assessment unchanged. Patient currently receiving dialysis treatment. Bilateral soft wrist restraints in place.

## 2022-01-29 NOTE — PROGRESS NOTES
01/28/22 1933   Vent Information   Vent Type 980   Vent Mode AC/VC   Vt Ordered 430 mL   Rate Set 22 bmp   Peak Flow 100 L/min   Pressure Support 0 cmH20   FiO2  40 %   SpO2 94 %   SpO2/FiO2 ratio 235   Sensitivity 3   PEEP/CPAP 5   I Time/ I Time % 0 s   Humidification Source Heated wire   Humidification Temp Measured 35.6   Circuit Condensation Drained   Vent Patient Data   High Peep/I Pressure 0   Peak Inspiratory Pressure 34 cmH2O   Mean Airway Pressure 11 cmH20   Rate Measured 31 br/min   Vt Exhaled 442 mL   Minute Volume 13.8 Liters   I:E Ratio 1:2.90   Plateau Pressure 18 NBY81   Static Compliance 32 mL/cmH2O   Dynamic Compliance 15 mL/cmH2O   Cough/Sputum   Sputum How Obtained Endotracheal   Cough Productive   Sputum Amount Moderate   Sputum Color Creamy   Tenacity Thick   Spontaneous Breathing Trial (SBT) RT Doc   Pulse 78   Breath Sounds   Right Upper Lobe Diminished   Right Middle Lobe Diminished   Right Lower Lobe Diminished   Left Upper Lobe Diminished   Left Lower Lobe Diminished   Additional Respiratory  Assessments   Resp (!) 31   Alarm Settings   High Pressure Alarm 45 cmH2O   Low Minute Volume Alarm 3 L/min   Apnea (secs) 20 secs   High Respiratory Rate 45 br/min   Low Exhaled Vt  300 mL   Patient Observation   Observations 7.5 ett 24 at lip

## 2022-01-29 NOTE — PROGRESS NOTES
Care rounds complete with Dr. Hailey Toribio. Discussed high blood sugars, new orders entered by Dr. Hailey Toribio. Patient to have bronchoscopy later this date after dialysis.

## 2022-01-29 NOTE — PROGRESS NOTES
01/29/22 0322   Vent Information   Vent Type 980   Vent Mode AC/VC   Vt Ordered 430 mL   Rate Set 22 bmp   Peak Flow 100 L/min   Pressure Support 0 cmH20   FiO2  40 %   SpO2 91 %   SpO2/FiO2 ratio 227.5   Sensitivity 3   PEEP/CPAP 5   I Time/ I Time % 0 s   Humidification Source Heated wire   Humidification Temp Measured 35.7   Circuit Condensation Drained   Vent Patient Data   High Peep/I Pressure 0   Peak Inspiratory Pressure 20 cmH2O   Mean Airway Pressure 6.4 cmH20   Rate Measured 36 br/min   Vt Exhaled 405 mL   Minute Volume 15.5 Liters   I:E Ratio 1:2.40   Cough/Sputum   Sputum How Obtained Endotracheal   Cough Productive   Sputum Amount Moderate   Sputum Color Creamy   Tenacity Thick   Spontaneous Breathing Trial (SBT) RT Doc   Pulse 101   Breath Sounds   Right Upper Lobe Diminished   Right Middle Lobe Diminished   Right Lower Lobe Diminished   Left Upper Lobe Diminished   Left Lower Lobe Diminished   Additional Respiratory  Assessments   Resp (!) 36   Alarm Settings   High Pressure Alarm 45 cmH2O   Low Minute Volume Alarm 3 L/min   Apnea (secs) 20 secs   High Respiratory Rate 50 br/min   Low Exhaled Vt  300 mL   Patient Observation   Observations 7.5 ett 24 at lip

## 2022-01-29 NOTE — PROGRESS NOTES
Progress Note    HISTORY     CC:  AMS          We are following for acute kidney injury       Subjective/   HPI:  Patient remains in the ICU in critical condition. Off CRRT. Off pressors. IHD yesterday was tolerated. ROS:  Constitutional:  No fevers, hypothermic   Respiratory: On vent  :  Anuric     Social Hx:  No family at the bedside     Past Medical and Surgical History:  - Reviewed, no changes     EXAM       Objective/     Vitals:    01/29/22 0400 01/29/22 0500 01/29/22 0600 01/29/22 0700   BP: (!) 159/86 120/75 114/70 115/68   Pulse: 104 94 83 83   Resp: (!) 37 (!) 33 (!) 33 27   Temp: 100.7 °F (38.2 °C)   101.3 °F (38.5 °C)   TempSrc: Bladder   Bladder   SpO2: 91% 90% 92% 95%   Weight:       Height:         24HR INTAKE/OUTPUT:      Intake/Output Summary (Last 24 hours) at 1/29/2022 0803  Last data filed at 1/29/2022 0600  Gross per 24 hour   Intake 3488.86 ml   Output 1621 ml   Net 1867.86 ml     Constitutional:  Critically ill   Eyes:  Pupils reactive, sclera clear   Neck:  Normal thyroid, no masses   Cardiovascular:  Regular, no rub  Respiratory: On vent, no wheezing  Psychiatry:  Sedated on vent   Abdomen: +bs, soft, nt, no masses   Musculoskeletal: No LE edema, no clubbing   Lymphatics:  No LAD in neck, no supraclavicular nodes       MEDICAL DECISION MAKING       Data/  Recent Labs     01/27/22 0319 01/28/22 0439 01/29/22  0347   WBC 29.7* 28.0* 27.9*   HGB 11.3* 10.1* 10.1*   HCT 34.4* 31.3* 30.8*   MCV 92.3 95.2 93.2    151 182     Recent Labs     01/27/22 0319 01/28/22  0439 01/29/22  0347   * 131* 130*   K 4.7 4.3 4.2   CL 98* 97* 95*   CO2 21 20* 19*   GLUCOSE 326* 391* 416*   PHOS 4.1 3.7 4.1   MG 2.70* 2.80* 2.60*   BUN 34* 87* 93*   CREATININE 2.6* 4.4* 4.2*   LABGLOM 27* 15* 15*   GFRAA 33* 18* 19*       Assessment/     RODRICK likely related to prerenal factors in the setting of sepsis, use of diuretics and losartan. UA is not suggestive of glomerulonephritis. Patient did not respond to IV fluid and developed acute pulmonary edema and renal replacement therapy initiated on 1/23/22     -Acute pulmonary edema              Status post intubation     -Acute hypoxic respiratory failure     -cardio respiratory arrest      -Sepsis with MSSA bacteremia     -Morbid obesity     -Diabetes, uncontrolled    Plan/     HD today  UF 1 kg  Follow labs  Does not appear he will quickly recover so likely to need TDC placed next week    -----------------------------  Cindy Bennett M.D.   [facility]

## 2022-01-29 NOTE — PROGRESS NOTES
Hospitalist Progress Note      PCP: Elda Bearden MD    Date of Admission: 1/22/2022     Admitted with acute hypoxic respiratory failure, septic shock and MSSA bacteremia  Left foot abscess drained 1/24/2022  Cardiopulmonary arrest x2 on 1/23/2022, required CPR  Went with acute kidney injury currently on CRRT    Subjective: on CRRT, still on levophed  Off epinephrine  On insulin drip    1/27  Failed SBT    CRRT stopped-Lasix 100 mg IV x1 given  Off levophed     1/28  Not making urine despite receiving Lasix 100 mg IV  Increased tachypnea and hypoxia off sedation  On Precedex    1/29  Currently on hemodialysis  On Precedex only. Off all the sedation. Doing well on SBT, however not waking up.   Fever     Medications:  Reviewed    Infusion Medications    dexmedetomidine HCl in NaCl 1.2 mcg/kg/hr (01/29/22 1228)    midazolam Stopped (01/27/22 2352)    sodium chloride 5 mL/hr at 01/29/22 0431    dextrose      fentaNYL Stopped (01/29/22 0941)    norepinephrine Stopped (01/26/22 8539)     Scheduled Medications    insulin glargine  55 Units SubCUTAneous BID    heparin (porcine)  5,000 Units SubCUTAneous 3 times per day    ceFAZolin 2000 mg in 20 mL SWFI IV Syringe  2,000 mg IntraVENous Q24H    mupirocin   Nasal BID    insulin lispro  0-18 Units SubCUTAneous Q4H    famotidine (PEPCID) injection  20 mg IntraVENous Daily    sodium chloride flush  5-40 mL IntraVENous 2 times per day    chlorhexidine  15 mL Mouth/Throat BID     PRN Meds: albumin human, ceFAZolin, midazolam, heparin (porcine), sodium chloride flush, sodium chloride, acetaminophen **OR** acetaminophen, glucose, glucagon (rDNA), dextrose, dextrose bolus (hypoglycemia) **OR** dextrose bolus (hypoglycemia)      Intake/Output Summary (Last 24 hours) at 1/29/2022 1311  Last data filed at 1/29/2022 1230  Gross per 24 hour   Intake 3181.86 ml   Output 1623 ml   Net 1558.86 ml       Physical Exam Performed:    /74   Pulse 85   Temp pneumothorax. XR CHEST PORTABLE   Final Result   Improved lung volumes. Bilateral perihilar opacification, edema versus   infiltrate. Satisfactory position of endotracheal tube. Central line tip in either the   distal brachiocephalic vein or proximal SVC. XR CHEST PORTABLE   Final Result   Cardiomegaly with left mid and lower lung infiltrates. Support tubes as described above. XR CHEST 1 VIEW   Final Result   Limited chest as outlined above. Bilateral perihilar opacification, edema   versus infiltrate. XR CHEST PORTABLE   Final Result   Low lung volumes. No acute cardiopulmonary disease. XR CHEST PORTABLE    (Results Pending)           Assessment/Plan:    Active Hospital Problems    Diagnosis     Hypertriglyceridemia [E78.1]     Abscess of left foot [L02.612]     MSSA bacteremia [R78.81, B95.61]     Third degree burn of left hand [T23.302A]     Syncope [R55]     Acute hypoxemic respiratory failure ([address] [J96.01]     Septic shock (HCC) [A41.9, R65.21]     Cardiopulmonary arrest ([address] [I46.9]     Acute renal failure ([address] [N17.9]     Hyperglycemia [R73.9]     Mixed hyperlipidemia [E78.2]     Cardiomyopathy ([address] [I42.9]     Uncontrolled type 2 diabetes mellitus with diabetic polyneuropathy ([address] [E11.42, E11.65]            #RODRICK.  Patient admitted to hospital with RODRICK.  Normal renal function October 2021. Peg Bucker is suspected to be prerenal/ATN.  Creatinine worsened.  Nephrology consulted. Froy Dee was started on IV fluids  Emergent Vas-Cath placed and CRRT started.  Critical care consulted  CRRT stopped 1/27--> Lasix 100 mg IV x1 given--no response  HD today     #Acute respiratory failure. Suspect patient developed acute pulmonary edema causing acute respiratory failure from renal failure. Intubated 1/23/22.  Pulmonary consulted. Not having purposeful movements or following commands. obtain head CT-negative for acute findings.   Doing well on SBT however     #H/o cardiomyopathy - check echo with EF 35%, cardio consulted     #S/p PEA arrest 1/23/22     # Left foot abscess - s/p I&D, ID and podiatry consulted, cx sent-Staph aureus     #MSSA bacteremia. MSSA foot abscess. Septic shock. started Ancef.  ID consultation. Hector Carirllo reviewed. Currently on Levophed. Epinephrine DC'd. Continue Solu-Cortef. now off levo    A. fib with controlled ventricular rate  On heparin drip     #Diabetes mellitus type 2 uncontrolled.  Insulin.  Continue sliding scale. Lantus 55 units twice daily. monitor sugars.     #Hypertension.  Blood pressure soft.  Hold blood pressure medications.      Heparin gtt for DVT prophylaxis.   Diet: Diet NPO  ADULT TUBE FEEDING; Orogastric; Peptide Based High Protein; Continuous; 20; Yes; 20; Q 4 hours; 90; 30; Q 4 hours  Code Status: Full Code    PT/OT Eval Status: ordered    Mary Agarwal MD

## 2022-01-29 NOTE — PROGRESS NOTES
Reassessment complete. Patient continues intubated. Dialysis treatment complete. Physical assessment unchanged at this time. Patient now on [facility] ventilation per RT. Dressing to LLE changed per order. Patient repositioned for comfort. Bilateral soft wrist restraints in place for patient safety. Precedex infusing at 1.2 mcg/kg/h.

## 2022-01-29 NOTE — PROGRESS NOTES
9:16 PM  Shift assessment completed, see flow sheet. PM meds given per MAR. Intubated, SpO2 94% on 40% FiO2 & 5 PEEP. Sedated with fentanyl at 100 mcg/hr, precedex at 0.8 mcg/kg/hr, RASS -3 and CPOT 0 with occasional double stacking.   Munoz catheter in place draining red, cloudy urine. UO minimal and blood tinged. OG in place at 64 with TF infusing at goal rate, GRV 75. CVC & PIVs in place and functioning appropriately, dressings C/D/I. Restraints in use for pt safety.   Repositioning pt q2h and PRN, oral care completed at this time. Pt's wife, Elizabeth Chris, updated at this time. 12:31 AM  Reassessment completed, see flow sheet. Vent settings unchanged. Sedation unchanged. CHG bath & linen change provided, WC completed on L hand. Pt repositioned. 1:37 AM  Pt's RR sitting at 38-40 and asynchronous with vent & SBP is up to 160, PRN versed administered & fentanyl increased to 125 mcg/hr. 3:10 AM  RR still high and SBP is only down to 152. PRN versed administered & precedex increased to 0.9    4:28 AM  Reassessment completed, see flow sheet. Pt's RR still staying high despite PRNs and increased sedation. Precedex now at 1, fentanyl remains at 125. PRN versed administered. Pt repositioned. 7:31 AM  EOS report given to Valorie Hill RN. Pt stable at this time, care transferred.

## 2022-01-29 NOTE — PROGRESS NOTES
01/29/22 1518   Vent Information   Vt Ordered 430 mL   Rate Set 22 bmp   Peak Flow 100 L/min   Pressure Support 0 cmH20   FiO2  60 %   SpO2 93 %   SpO2/FiO2 ratio 155   Sensitivity 3   PEEP/CPAP 5   I Time/ I Time % 0 s   Humidification Source Heated wire   Humidification Temp Measured 37   Vent Patient Data   High Peep/I Pressure 0   Peak Inspiratory Pressure 28 cmH2O   Mean Airway Pressure 12 cmH20   Rate Measured 39 br/min   Vt Exhaled 446 mL   Minute Volume 18 Liters   I:E Ratio 1:2.20   Cough/Sputum   Sputum How Obtained None   Spontaneous Breathing Trial (SBT) RT Doc   Pulse 85   Breath Sounds   Right Upper Lobe Diminished   Right Middle Lobe Diminished   Right Lower Lobe Diminished   Left Upper Lobe Diminished   Left Lower Lobe Diminished   Additional Respiratory  Assessments   Resp (!) 39   Position Semi-Springer's   Oral Care Completed? Yes   Oral Care Mouth suctioned   Subglottic Suction Done?  Yes   Alarm Settings   High Pressure Alarm 45 cmH2O   Low Minute Volume Alarm 3 L/min   High Respiratory Rate 50 br/min   ETT (adult)   Placement Date: 01/23/22   Preoxygenation: Yes  Technique: Direct laryngoscopy  Type: Cuffed  Tube Size: 7.5 mm  Insertion attempts: 1  Secured at: 24 cm  Measured From: Lips   Secured at 24 cm   Measured From Lips   ET Placement Left w/wound check

## 2022-01-29 NOTE — PROGRESS NOTES
Morning assessment complete. Patient seen intubated and sedated. Patient intubated with a # 7.5 ETT at the 24 LL. Ventilator settings are currently AC 24/430/45/5. Patient over-breathing the ventilator, >30 breaths per minute. Physical assessment as charted in flow sheets. Scheduled medications given per orders. Fentanyl infusing at 125 mcg//h. Precedex infusing at 1 mcg/kg/h. Central line dressing is clean, dry and intact with no signs of drainage. All tubing is current and dated. IPA caps applied to all access hubs. Peripheral IVs C/D/I and functioning properly. Munoz intact and secure, small amount of tea colored urine noted. OG intact and secure with tube feed running at goal rate. Patient repositioned for comfort. Bilateral soft wrist restraints in place for patient safety and the safety of all lines/tubes.

## 2022-01-29 NOTE — PROGRESS NOTES
Consent verified for bronchoscopy. Timeout per policy. Procedure performed by Dr. Ghassan Pack. Bronchoscopy assist performed on 100 FiO2.    0ml of 1% lidocaine instilled and 60ml of 0.9% normal saline instilled. Pt tolerated bronchoscopy without difficulty, airway support per RTD. Patient SpO2 within acceptable range throughout bronchoscopy procedure. Sedation provided/monitored per nurse. No s/s distress, no complications noted. See physician notes.   Continue plan of care

## 2022-01-30 NOTE — PROGRESS NOTES
Morning assessment complete. Patient seen intubated. RASS - 4, patient with no physical response to painful stimulation at this time. Patient intubated with a # 7.5 ETT at the 24 LL. Ventilator settings are AC 22/430/45/5. Patient tolerating ventilator at this time, although respiratory rate is >40/minute. Physical assessment as charted in flow sheets. Scheduled medications given per orders. Precedex infusing at 1.6 mcg/kg/h. Central line dressing is clean, dry and intact with no signs of drainage. All tubing is current and dated. IPA caps applied to all access hubs. Peripheral IVs C/D/I and functioning properly. Munoz intact and secure, with small amount of dark brown urine. OG intact with tube feed running at goal rate. Patient repositioned for comfort. Bilateral soft wrist restraints in place for patient safety.

## 2022-01-30 NOTE — PROGRESS NOTES
01/29/22 1913   Vent Information   Vent Type 980   Vent Mode AC/VC   Vt Ordered 430 mL   Rate Set 22 bmp   Peak Flow 100 L/min   Pressure Support 0 cmH20   FiO2  55 %   SpO2 95 %   SpO2/FiO2 ratio 172.73   Sensitivity 3   PEEP/CPAP 5   I Time/ I Time % 0 s   Humidification Source Heated wire   Humidification Temp Measured 35.8   Circuit Condensation Drained   Vent Patient Data   High Peep/I Pressure 0   Peak Inspiratory Pressure 32 cmH2O   Mean Airway Pressure 12 cmH20   Rate Measured 33 br/min   Vt Exhaled 386 mL   Minute Volume 14.5 Liters   I:E Ratio 1:2.40   Plateau Pressure 21 GHT83   Static Compliance 23 mL/cmH2O   Dynamic Compliance 14 mL/cmH2O   Cough/Sputum   Sputum How Obtained Endotracheal   Cough Productive   Sputum Amount Small   Sputum Color Creamy; Red   Tenacity Thick   Spontaneous Breathing Trial (SBT) RT Doc   Pulse 79   Breath Sounds   Right Upper Lobe Diminished   Right Middle Lobe Diminished   Right Lower Lobe Diminished   Left Upper Lobe Diminished   Left Lower Lobe Diminished   Additional Respiratory  Assessments   Resp (!) 35   Alarm Settings   High Pressure Alarm 45 cmH2O   Low Minute Volume Alarm 3 L/min   Apnea (secs) 20 secs   High Respiratory Rate 50 br/min   Low Exhaled Vt  300 mL   Patient Observation   Observations 7.5 ett 24 at lip

## 2022-01-30 NOTE — PROGRESS NOTES
Patient updates given to Hannah Correa RN. Patient continues resting on ventilator, Precedex has been titrated up to 1. 6mcg/kg/hr throughout the night for RR 40s. Patient coughs intermittently, and breathes more rapidly with stimulation, but does not open eyes or follow commands. Care transferred.

## 2022-01-30 NOTE — PROGRESS NOTES
Patient care assumed, assessment completed as charted. Patient continues on ventilator, #7.5 at the 25 lip line. A/C 22, , FiO2 55%, PEEP 5. Right IJ CVC in place with Precedex  Infusing at 1.2mcg/kg/hr. right femoral VasCath in place, OG with tube feed running at 90mL/hr, rea catheter patent, bilateral wrist restraints in place for patient safety. No further needs assessed at this time. Will monitor.

## 2022-01-30 NOTE — PLAN OF CARE
Care plan ongoing    Problem: Falls - Risk of:  Goal: Will remain free from falls  Description: Will remain free from falls  7/28/2020 0033 by Mireille Fletcher RN  Outcome: Ongoing  7/27/2020 1809 by Derrick Gallagher RN  Outcome: Ongoing       Problem: Pain:  Goal: Patient's pain/discomfort is manageable  Description: Patient's pain/discomfort is manageable  Outcome: Ongoing     Problem: Nutritional:  Goal: Nutritional status will improve  Description: Nutritional status will improve  7/28/2020 0033 by Mireille Fletcher RN  Outcome: Ongoing  7/27/2020 1809 by Derrick Gallagher RN  Outcome: Ongoing 31-Jan-2022

## 2022-01-30 NOTE — PROGRESS NOTES
Progress Note    HISTORY     CC:  AMS          We are following for acute kidney injury       Subjective/   HPI:  Remains in the ICU. Not waking up. IHD yesterday tolerated. Not making urine or showing signs of renal recovery    ROS:  Constitutional:  + fevers  Respiratory: On vent  :  Anuric     Social Hx:  No family at the bedside     Past Medical and Surgical History:  - Reviewed, no changes     EXAM       Objective/     Vitals:    01/30/22 0505 01/30/22 0600 01/30/22 0605 01/30/22 0700   BP: (!) 150/75 125/70 125/70 125/65   Pulse: 78 76 74 73   Resp: (!) 38 29 (!) 31 (!) 39   Temp:       TempSrc:       SpO2: 94% 94% 95% 96%   Weight:       Height:         24HR INTAKE/OUTPUT:      Intake/Output Summary (Last 24 hours) at 1/30/2022 0814  Last data filed at 1/30/2022 0555  Gross per 24 hour   Intake 2958.33 ml   Output 1524 ml   Net 1434.33 ml     Constitutional:  Critically ill   Eyes:  Pupils reactive, sclera clear   Neck:  Normal thyroid, no masses   Cardiovascular:  Regular, no rub  Respiratory: On vent, no wheezing  Psychiatry:  Sedated on vent   Abdomen: +bs, soft, nt, no masses   Musculoskeletal: No LE edema, no clubbing   Lymphatics:  No LAD in neck, no supraclavicular nodes       MEDICAL DECISION MAKING       Data/  Recent Labs     01/28/22  0439 01/29/22  0347 01/30/22  0415   WBC 28.0* 27.9* 26.3*   HGB 10.1* 10.1* 9.0*   HCT 31.3* 30.8* 27.6*   MCV 95.2 93.2 93.5    182 186     Recent Labs     01/28/22  0439 01/29/22  0347 01/30/22  0415   * 130* 133*   K 4.3 4.2 4.8   CL 97* 95* 98*   CO2 20* 19* 18*   GLUCOSE 391* 416* 379*   PHOS 3.7 4.1 6.0*   MG 2.80* 2.60* 2.60*   BUN 87* 93* 98*   CREATININE 4.4* 4.2* 5.1*   LABGLOM 15* 15* 12*   GFRAA 18* 19* 15*       Assessment/     RODRICK likely related to prerenal factors in the setting of sepsis, use of diuretics and losartan contributing to multifactorial ATN. UA is not suggestive of staph associated glomerulonephritis. Patient did not respond to IV fluid and developed acute pulmonary edema and renal replacement therapy initiated on 1/23/22   No signs of renal recovery      -Acute pulmonary edema              Status post intubation   On vent      -Acute hypoxic respiratory failure     -cardio respiratory arrest      -Sepsis with MSSA bacteremia     -Morbid obesity     -Diabetes, uncontrolled    Plan/     HD Tuesday unless need develops tomorrow, assess daily   Follow labs  Does not appear he will quickly recover so likely to need TDC placed next week but will need fevers to resolve     -----------------------------  Janet Webster M.D.   [facility]

## 2022-01-30 NOTE — PROGRESS NOTES
01/29/22 2303   Vent Information   Vent Type 980   Vent Mode AC/VC   Vt Ordered 430 mL   Rate Set 22 bmp   Peak Flow 100 L/min   Pressure Support 0 cmH20   FiO2  55 %   SpO2 94 %   SpO2/FiO2 ratio 170.91   Sensitivity 3   PEEP/CPAP 5   I Time/ I Time % 0 s   Vent Patient Data   High Peep/I Pressure 0   Peak Inspiratory Pressure 32 cmH2O   Mean Airway Pressure 14 cmH20   Rate Measured 42 br/min   Vt Exhaled 457 mL   Minute Volume 19.2 Liters   I:E Ratio 1:2.10   Cough/Sputum   Sputum How Obtained Suctioned;Endotracheal   Sputum Amount Small   Sputum Color Creamy; Red   Tenacity Thick   Spontaneous Breathing Trial (SBT) RT Doc   Pulse 83   Breath Sounds   Right Upper Lobe Diminished   Right Middle Lobe Diminished   Right Lower Lobe Diminished   Left Upper Lobe Diminished   Left Lower Lobe Diminished   Additional Respiratory  Assessments   Resp (!) 43   Position Semi-Springer's   Alarm Settings   High Pressure Alarm 45 cmH2O   Low Minute Volume Alarm 3 L/min   High Respiratory Rate 50 br/min   ETT (adult)   Placement Date: 01/23/22   Preoxygenation: Yes  Technique: Direct laryngoscopy  Type: Cuffed  Tube Size: 7.5 mm  Insertion attempts: 1  Secured at: 24 cm  Measured From: 1 [employer]

## 2022-01-30 NOTE — PROGRESS NOTES
Pulmonary [facility] Critical Care Medicine ICU Progress Note    CC: Hypoxemic respiratory failure    Events of Last 24 hours:  Not following commands or having purposeful movement. Tachypnea on SBT. Tm 101.2     Fentanyl off   Precedex 1.6  Vascular lines: IV:   Right IJ 1/23  Right femoral Vas-Cath 1/23    MV: 1/23  Vent Mode: AC/VC Rate Set: 22 bmp/Vt Ordered: 430 mL/ /FiO2 : 50 %  Recent Labs     01/29/22  0348 01/30/22  0415   PHART 7.386 7.451*   VDH8IJX 31.2* 25.1*   PO2ART 64.5* 66.1*       IV:   dexmedetomidine HCl in NaCl 1.6 mcg/kg/hr (01/30/22 0845)    midazolam Stopped (01/27/22 2352)    sodium chloride 5 mL/hr at 01/30/22 0555    dextrose      fentaNYL Stopped (01/29/22 0940)    norepinephrine Stopped (01/26/22 2254)       Vitals:  Blood pressure 134/76, pulse 85, temperature 100.7 °F (38.2 °C), temperature source Bladder, resp. rate (!) 43, height 6' 1\" (1.854 m), weight 289 lb 7.4 oz (131.3 kg), SpO2 94 %. on vent    Intake/Output Summary (Last 24 hours) at 1/30/2022 0955  Last data filed at 1/30/2022 0814  Gross per 24 hour   Intake 2958.33 ml   Output 1530 ml   Net 1428.33 ml     General: ill appearing. Intubated sedated. Eyes: PERRL. No sclera icterus. No conjunctival injection. ENT: No discharge. Pharynx clear. ET tube in place  Neck: Trachea midline. Normal thyroid. Resp: No accessory muscle use. Few crackles. No wheezing. Few rhonchi. CV: Regular rate. irregular rhythm. No mumur or rub. 1-2+ LE edema. GI: Non-tender. Non-distended. No masses. M/S: No cyanosis. No joint deformity. No clubbing. Neuro: Intubated sedated. No response to painful stimuli. Not following commands.    Psych: Noncommunicative unable to obtain    Scheduled Meds:   insulin glargine  55 Units SubCUTAneous BID    heparin (porcine)  5,000 Units SubCUTAneous 3 times per day    ceFAZolin 2000 mg in 20 mL SWFI IV Syringe  2,000 mg IntraVENous Q24H    mupirocin   Nasal BID    insulin lispro  0-18 Units SubCUTAneous Q4H    famotidine (PEPCID) injection  20 mg IntraVENous Daily    sodium chloride flush  5-40 mL IntraVENous 2 times per day    chlorhexidine  15 mL Mouth/Throat BID       Data:  CBC:   Recent Labs     01/28/22 0439 01/29/22 0347 01/30/22  0415   WBC 28.0* 27.9* 26.3*   HGB 10.1* 10.1* 9.0*   HCT 31.3* 30.8* 27.6*   MCV 95.2 93.2 93.5    182 186     BMP:   Recent Labs     01/28/22 0439 01/29/22 0347 01/30/22  0415   * 130* 133*   K 4.3 4.2 4.8   CL 97* 95* 98*   CO2 20* 19* 18*   PHOS 3.7 4.1 6.0*   BUN 87* 93* 98*   CREATININE 4.4* 4.2* 5.1*     LIVER PROFILE:   Recent Labs     01/28/22 0439 01/29/22 0347 01/30/22  0415   AST 64* 105* 83*   ALT 11 14 9*   BILIDIR 0.3 0.5* 0.4*   BILITOT 0.5 0.8 0.8   ALKPHOS 359* 467* 518*       Microbiology:  1/22 BC Staphylococcus  1/22 COVID-19 and influenza negative  1/23/22 Blood cx: NGTD  1/23 tracheal aspirate  1/24 Wound cx: MSSA  1/24 BC MSSA  1/29/22 BAL    Echo 1/25/22: EF 35%, global HK, reduced RV function    Chest x-ray 1/29/22 imaging was reviewed by me and showed   1. Stable lines, tubes, and support devices. 2. Stable cardiopulmonary status after accounting for differences in patient   positioning including bilateral airspace opacities. 3. Cardiomegaly. 4. Low lung volumes. ASSESSMENT:  · Acute hypoxemic respiratory failure  · Septic shock -resolved  · Anoxicencephalopathy  · MSSA bacteremia  · Hypertriglyceridemia   · L foot abscess drained 1/24/2022  · Cardiopulmonary arrest x2 1/23/2022 required CPR  · Afib- rate controlled   · Cardiomyopathy EF 35% on Echo 1/24  · Acute pulmonary edema/fluid overload  · Acute kidney injury  · Uremia  · DM with hyperglycemia  · Left hand burn  · History of cardiomyopathy-last echo 2014 EF 50%     PLAN:  SAT/SBT  Mechanical ventilation as per my orders. The ventilator was adjusted by me at the bedside for unstable, life threatening respiratory failure.   Fentanyl and Versed gtt for sedation, target RASS -2, with daily spontaneous awakening trial.  Head of bed 30 degrees or higher at all times  · Daily spontaneous breathing trial once PEEP less than 8, FiO2 less than 55%. · Precedex and Fentanyl gtt  · f/u BAL  · Restart Insulin gtt  · IV Ancef -ID following  · Change to vanc and Cefepime for continue fevers while cultures are pending  · Follow cultures  · EEG to r/o status  · TTM- rewarmed 8pm 1/25   · Nephrology following for intermittent HD   · Nutrition: Tube feeding   · Lantus 55 BID and ISS   · GI prophylaxis: Pepcid    · DVT prophylaxis: SQ heparin   · Total critical care time caring for this patient with life threatening, unstable organ failure, including direct patient contact, management of life support systems, review of data including imaging and labs, discussions with other team members and physicians is 32 minutes, excluding procedures.

## 2022-01-30 NOTE — PROGRESS NOTES
Hospitalist Progress Note      PCP: Pat Gaffney MD    Date of Admission: 1/22/2022     Admitted with acute hypoxic respiratory failure, septic shock and MSSA bacteremia  Left foot abscess drained 1/24/2022  Cardiopulmonary arrest x2 on 1/23/2022, required CPR  Went with acute kidney injury currently on CRRT    Subjective: on CRRT, still on levophed  Off epinephrine  On insulin drip    1/27  Failed SBT    CRRT stopped-Lasix 100 mg IV x1 given  Off levophed     1/28  Not making urine despite receiving Lasix 100 mg IV  Increased tachypnea and hypoxia off sedation  On Precedex    1/29  Currently on hemodialysis  On Precedex only. Off all the sedation. Doing well on SBT, however not waking up. Fever     1/30  Currently only on Precedex. Not following any commands or having any purposeful movements.   T-max 101.2      Medications:  Reviewed    Infusion Medications    insulin 8.34 Units/hr (01/30/22 1155)    dexmedetomidine HCl in NaCl 1.6 mcg/kg/hr (01/30/22 1100)    midazolam Stopped (01/27/22 2352)    sodium chloride 5 mL/hr at 01/30/22 0555    dextrose      fentaNYL Stopped (01/29/22 0940)    norepinephrine Stopped (01/26/22 2254)     Scheduled Medications    cefepime  1,000 mg IntraVENous Q24H    linezolid  600 mg IntraVENous Q12H    heparin (porcine)  5,000 Units SubCUTAneous 3 times per day    mupirocin   Nasal BID    famotidine (PEPCID) injection  20 mg IntraVENous Daily    sodium chloride flush  5-40 mL IntraVENous 2 times per day    chlorhexidine  15 mL Mouth/Throat BID     PRN Meds: albumin human, heparin (porcine), midazolam, sodium chloride flush, sodium chloride, acetaminophen **OR** acetaminophen, glucose, glucagon (rDNA), dextrose, dextrose bolus (hypoglycemia) **OR** dextrose bolus (hypoglycemia)      Intake/Output Summary (Last 24 hours) at 1/30/2022 1256  Last data filed at 1/30/2022 1157  Gross per 24 hour   Intake 2958.33 ml   Output 1544 ml   Net 1414.33 ml Physical Exam Performed:    /63   Pulse 74   Temp 101 °F (38.3 °C) (Bladder)   Resp (!) 40   Ht 6' 1\" (1.854 m)   Wt 289 lb 7.4 oz (131.3 kg)   SpO2 95%   BMI 38.19 kg/m²       Gen: intubated  Eyes: PERRL. No sclera icterus. No conjunctival injection. ENT: No discharge. Pharynx clear. Neck: Trachea midline. Normal thyroid. Resp:+ accessory muscle use. + crackles. No wheezes. No rhonchi. CV: INCREASED rate. Regular rhythm. No murmur or rub. No edema. GI: Non-tender. Non-distended. No masses. No organomegaly. Normal bowel sounds. No hernia. Skin: burns left hand with some yellow slough. Chronic skin changes both legs.   Lymph: No cervical LAD. No supraclavicular LAD. M/S: No cyanosis. No joint deformity. No clubbing. Missing right big toe  Neuro: Intubated, on Precedex. Not waking up. Labs:   Recent Labs     01/28/22 0439 01/29/22 0347 01/30/22 0415   WBC 28.0* 27.9* 26.3*   HGB 10.1* 10.1* 9.0*   HCT 31.3* 30.8* 27.6*    182 186     Recent Labs     01/28/22 0439 01/29/22 0347 01/30/22  0415   * 130* 133*   K 4.3 4.2 4.8   CL 97* 95* 98*   CO2 20* 19* 18*   BUN 87* 93* 98*   CREATININE 4.4* 4.2* 5.1*   CALCIUM 7.5* 7.6* 7.8*   PHOS 3.7 4.1 6.0*     Recent Labs     01/28/22 0439 01/29/22 0347 01/30/22  0415   AST 64* 105* 83*   ALT 11 14 9*   BILIDIR 0.3 0.5* 0.4*   BILITOT 0.5 0.8 0.8   ALKPHOS 359* 467* 518*     No results for input(s): INR in the last 72 hours. No results for input(s): Earnie Lent in the last 72 hours. Urinalysis:      Lab Results   Component Value Date    NITRU Negative 01/22/2022    WBCUA 0-2 01/22/2022    BACTERIA Rare 01/22/2022    RBCUA 0-2 01/22/2022    BLOODU TRACE-LYSED 01/22/2022    SPECGRAV >=1.030 01/22/2022    GLUCOSEU 500 01/22/2022       Radiology:  XR CHEST PORTABLE   Final Result   1. Stable lines, tubes, and support devices. 2. Bilateral airspace opacities with pleural effusions, left greater than   right.    3. Cardiomegaly. XR CHEST PORTABLE   Final Result   1. Stable lines, tubes, and support devices. 2. Stable cardiopulmonary status after accounting for differences in patient   positioning including bilateral airspace opacities. 3. Cardiomegaly. 4. Low lung volumes. CT HEAD WO CONTRAST   Final Result   1. No acute intracranial abnormality. XR CHEST PORTABLE   Final Result   CHF with increasing pulmonary edema. XR CHEST PORTABLE   Final Result   Patchy airspace disease, left greater than right which may represent   atelectasis or pneumonia. XR CHEST PORTABLE   Final Result   Stable support apparatus. Increasing bilateral airspace opacities which may be related to edema and/or   pneumonia. XR CHEST PORTABLE   Final Result   Low lung volumes/poor inspiratory effort limiting the study. No significant improvement. A mild cardiomegaly. Mild congestion and/or   infiltrates identified in the lungs. No pneumothorax. XR FOOT LEFT (2 VIEWS)   Final Result   1. Remote history of amputation at the 1st and 2nd digits. No evidence of   osteomyelitis at prior resection site. 2. Subtle erosions at the 3rd MTP joint are new. This is adjacent to soft   tissue swelling at the prior amputation site. A new focus of osteomyelitis   is suspected. 3. Soft tissue swelling and questionable subcutaneous gas along the lateral   aspect of the left foot. There is also severe disorganization of bone at the   2nd through 5th tarsometatarsal joints. Although pattern may represent   Charcot arthropathy due to the more diffuse appearance, areas of   osteomyelitis cannot be excluded with plain film. Correlate with physical   exam and clinical workup. A follow-up MRI may be helpful for further   evaluation.          XR CHEST PORTABLE   Final Result   Low lung volumes with cardiomegaly and vascular congestion, as well as patchy   airspace disease bilaterally, similar to the previous exam.         XR CHEST PORTABLE   Final Result   Status post advancement of right internal jugular central line with distal   tip now visualized near region of junction of superior vena cava and right   atrium. No evidence of pneumothorax. XR CHEST PORTABLE   Final Result   Improved lung volumes. Bilateral perihilar opacification, edema versus   infiltrate. Satisfactory position of endotracheal tube. Central line tip in either the   distal brachiocephalic vein or proximal SVC. XR CHEST PORTABLE   Final Result   Cardiomegaly with left mid and lower lung infiltrates. Support tubes as described above. XR CHEST 1 VIEW   Final Result   Limited chest as outlined above. Bilateral perihilar opacification, edema   versus infiltrate. XR CHEST PORTABLE   Final Result   Low lung volumes. No acute cardiopulmonary disease. XR CHEST PORTABLE    (Results Pending)           Assessment/Plan:    Active Hospital Problems    Diagnosis     Hypertriglyceridemia [E78.1]     Abscess of left foot [L02.612]     MSSA bacteremia [R78.81, B95.61]     Third degree burn of left hand [T23.302A]     Syncope [R55]     Acute hypoxemic respiratory failure ([address] [J96.01]     Septic shock (HCC) [A41.9, R65.21]     Cardiopulmonary arrest ([address] [I46.9]     Acute renal failure ([address] [N17.9]     Hyperglycemia [R73.9]     Mixed hyperlipidemia [E78.2]     Cardiomyopathy ([address] [I42.9]     Uncontrolled type 2 diabetes mellitus with diabetic polyneuropathy ([address] [E11.42, E11.65]            #RODIRCK.  Patient admitted to hospital with RODRICK.  Normal renal function October 2021. Chilo Bautistaantonio is suspected to be prerenal/ATN.  Creatinine worsened.  Nephrology consulted. [facility] was started on IV fluids  Emergent Vas-Cath placed and CRRT started.  Critical care consulted  CRRT stopped 1/27--> Lasix 100 mg IV x1 given--no response  HD today     #Acute respiratory failure.  Suspect patient developed acute pulmonary edema causing acute respiratory failure from renal failure. Intubated 1/23/22.  Pulmonary consulted. Not having purposeful movements or following commands. obtain head CT-negative for acute findings. EEG ordered. [facility] with BAL and cultures 1/29     #H/o cardiomyopathy - check echo with EF 35%, cardio consulted     #S/p PEA arrest 1/23/22     # Left foot abscess - s/p I&D, ID and podiatry consulted, cx sent-Staph aureus     #MSSA bacteremia. MSSA foot abscess. Septic shock. started Ancef.  ID consultation. Vic Tadeo reviewed. Currently on Levophed. Epinephrine DC'd. Continue Solu-Cortef. now off levo  Antibiotics changed to IV cefepime and Zyvox 1/30 given persistent fevers. Culture sent    A. fib with controlled ventricular rate  On heparin drip     #Diabetes mellitus type 2 uncontrolled.  Insulin.  Continue sliding scale. Lantus 55 units twice daily. monitor sugars. Started on insulin drip 1/30     #Hypertension.  Blood pressure soft.  Hold blood pressure medications.      Heparin gtt for DVT prophylaxis.   Diet: Diet NPO  ADULT TUBE FEEDING; Orogastric; Peptide Based High Protein; Continuous; 20; Yes; 20; Q 4 hours; 90; 30; Q 4 hours  Code Status: Full Code    PT/OT Eval Status: ordered    Tye Germain MD

## 2022-01-30 NOTE — PROGRESS NOTES
01/30/22 0308   Vent Information   Vent Type 980   Vent Mode AC/VC   Vt Ordered 430 mL   Rate Set 22 bmp   Peak Flow 100 L/min   Pressure Support 0 cmH20   FiO2  55 %   SpO2 95 %   SpO2/FiO2 ratio 172.73   Sensitivity 3   PEEP/CPAP 5   I Time/ I Time % 0 s   Vent Patient Data   High Peep/I Pressure 0   Peak Inspiratory Pressure 45 cmH2O   Mean Airway Pressure 13 cmH20   Rate Measured 42 br/min   Vt Exhaled 477 mL   Minute Volume 13.4 Liters   I:E Ratio 1:25.0   Cough/Sputum   Sputum How Obtained Suctioned;Endotracheal   Sputum Amount Small   Sputum Color Creamy   Tenacity Thick   Spontaneous Breathing Trial (SBT) RT Doc   Pulse 73   Breath Sounds   Right Upper Lobe Diminished   Right Middle Lobe Diminished   Right Lower Lobe Diminished   Left Upper Lobe Diminished   Left Lower Lobe Diminished   Additional Respiratory  Assessments   Resp (!) 36   Position Semi-Springer's   Alarm Settings   High Pressure Alarm 45 cmH2O   Low Minute Volume Alarm 3 L/min   High Respiratory Rate 50 br/min   ETT (adult)   Placement Date: 01/23/22   Preoxygenation: Yes  Technique: Direct laryngoscopy  Type: Cuffed  Tube Size: 7.5 mm  Insertion attempts: 1  Secured at: 24 cm  Measured From: Lips   ET Placement Left

## 2022-01-30 NOTE — CONSULTS
Pharmacy to dose IV Cefepime:  Please give 1g IV Cefepime daily for bloodstream infection. On HD days give following HD. HD Dialysis intermittent  Possibly Monday or Tuesday. Rx will monitor and adjust based on frequency of HD if needed.   Sandra Majano [facility] PharmD 1/30/2022 10:46 AM

## 2022-01-30 NOTE — PROGRESS NOTES
Reassessment complete. Patient continues intubated, RASS - 4. Physical assessment unchanged from previous. Family currently at bedside. Insulin gtt started at this time. Patient repositioned for comfort. Family currently at bedside. Bilateral soft wrist restraints in place.

## 2022-01-30 NOTE — PLAN OF CARE
Problem: Falls - Risk of:  Goal: Will remain free from falls  Description: Will remain free from falls  Outcome: Ongoing  Note: Fall precautions in place. Goal: Absence of physical injury  Description: Absence of physical injury  Outcome: Ongoing     Problem: Skin Integrity:  Goal: Will show no infection signs and symptoms  Description: Will show no infection signs and symptoms  Outcome: Ongoing  Note: Patient turned/repositioned every 2 hours and as needed to maintain and improve skin integrity. Goal: Absence of new skin breakdown  Description: Absence of new skin breakdown  Outcome: Ongoing     Problem: Confusion - Acute:  Goal: Absence of continued neurological deterioration signs and symptoms  Description: Absence of continued neurological deterioration signs and symptoms  Outcome: Ongoing  Goal: Mental status will be restored to baseline  Description: Mental status will be restored to baseline  Outcome: Ongoing     Problem: Discharge Planning:  Goal: Ability to perform activities of daily living will improve  Description: Ability to perform activities of daily living will improve  Outcome: Ongoing  Goal: Participates in care planning  Description: Participates in care planning  Outcome: Ongoing     Problem: Injury - Risk of, Physical Injury:  Goal: Will remain free from falls  Description: Will remain free from falls  Outcome: Ongoing  Note: Fall precautions in place.    Goal: Absence of physical injury  Description: Absence of physical injury  Outcome: Ongoing     Problem: Mood - Altered:  Goal: Mood stable  Description: Mood stable  Outcome: Ongoing  Goal: Absence of abusive behavior  Description: Absence of abusive behavior  Outcome: Ongoing  Goal: Verbalizations of feeling emotionally comfortable while being cared for will increase  Description: Verbalizations of feeling emotionally comfortable while being cared for will increase  Outcome: Ongoing     Problem: Psychomotor Activity - Altered:  Goal: Absence of psychomotor disturbance signs and symptoms  Description: Absence of psychomotor disturbance signs and symptoms  Outcome: Ongoing     Problem: Sensory Perception - Impaired:  Goal: Demonstrations of improved sensory functioning will increase  Description: Demonstrations of improved sensory functioning will increase  Outcome: Ongoing  Goal: Decrease in sensory misperception frequency  Description: Decrease in sensory misperception frequency  Outcome: Ongoing  Goal: Able to refrain from responding to false sensory perceptions  Description: Able to refrain from responding to false sensory perceptions  Outcome: Ongoing  Goal: Demonstrates accurate environmental perceptions  Description: Demonstrates accurate environmental perceptions  Outcome: Ongoing  Goal: Able to distinguish between reality-based and nonreality-based thinking  Description: Able to distinguish between reality-based and nonreality-based thinking  Outcome: Ongoing  Goal: Able to interrupt nonreality-based thinking  Description: Able to interrupt nonreality-based thinking  Outcome: Ongoing     Problem: Sleep Pattern Disturbance:  Goal: Appears well-rested  Description: Appears well-rested  Outcome: Ongoing     Problem: Non-Violent Restraints  Goal: Removal from restraints as soon as assessed to be safe  Outcome: Ongoing  Note: Bilateral soft wrist restraints remain in place for patient safety, and to protect all lines and airways. Goal: No harm/injury to patient while restraints in use  Outcome: Ongoing  Goal: Patient's dignity will be maintained  Outcome: Ongoing     Problem: Nutrition  Goal: Optimal nutrition therapy  Outcome: Ongoing  Note: Patient receiving nutrition via continuous tube feed.   Goal: Understanding of nutritional guidelines  Outcome: Ongoing     Problem: Coping:  Goal: Ability to cope will improve  Description: Ability to cope will improve  Outcome: Ongoing

## 2022-01-30 NOTE — PROGRESS NOTES
Reassessment complete. Patient continues intubated. Physical assessment unchanged at this time. Patient cleaned for incontinence of stool. Patient repositioned for comfort. Bilateral soft wrist restraints in place for patient safety. Precedex infusing at 1.6 mcg/kg/h.

## 2022-01-31 NOTE — PROGRESS NOTES
01/31/22 0805   Vent Information   Vent Type 980   Vent Mode AC/VC   Vt Ordered 430 mL   Rate Set 22 bmp   Peak Flow 100 L/min   Pressure Support 0 cmH20   FiO2  50 %   SpO2 93 %   SpO2/FiO2 ratio 186   Sensitivity 3   PEEP/CPAP 5   I Time/ I Time % 0 s   Humidification Source Heated wire   Humidification Temp 37   Vent Patient Data   High Peep/I Pressure 0   Peak Inspiratory Pressure 35 cmH2O   Mean Airway Pressure 6.7 cmH20   Rate Measured 35 br/min   Vt Exhaled 663 mL   Minute Volume 14.4 Liters   I:E Ratio 1:2.30   Cough/Sputum   Sputum How Obtained Endotracheal   Sputum Amount Small   Sputum Color Yellow   Tenacity Thick   Spontaneous Breathing Trial (SBT) RT Doc   Pulse 83   Breath Sounds   Right Upper Lobe Diminished   Right Middle Lobe Diminished   Right Lower Lobe Diminished   Left Upper Lobe Diminished   Left Lower Lobe Diminished   Additional Respiratory  Assessments   Resp (!) 36   Position Semi-Springer's   Oral Care Completed? Yes   Oral Care Mouthwash with chlorhexidine   Subglottic Suction Done?  Yes   Alarm Settings   High Pressure Alarm 45 cmH2O   Low Minute Volume Alarm 3 L/min   Apnea (secs) 20 secs   High Respiratory Rate 50 br/min   Low Exhaled Vt  300 mL   Patient Observation   Observations 7.5 ett 24 at lip   ETT (adult)   Placement Date: 01/23/22   Preoxygenation: Yes  Technique: Direct laryngoscopy  Type: Cuffed  Tube Size: 7.5 mm  Insertion attempts: 1  Secured at: 24 cm  Measured From: Lips   Secured at 24 cm   Measured From [address] [employer]

## 2022-01-31 NOTE — PROGRESS NOTES
[facility] Infectious Disease Progress Note      Jacqueline May     : 1975    DATE OF VISIT:  2022  DATE OF ADMISSION:  2022       Subjective:     Jacqueline May is a 42 y.o. male whom I've been seeing for an MSSA left foot abscess, presumed osteo, bacteremia, (resolved) septic shock, multiorgan failure. Since I last saw him, he started to spike fevers again over the weekend, though didn't go back into shock. New cultures (blood and bronch) obtained, antibiotics broadened to cefepime and linezolid. On less sedation now, still vented, still nearly anuric, on intermittent HD. No diarrhea, not a lot in the way of ETT secretions. FIO2 stable at 50%. Mr. 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     Current Facility-Administered Medications: cefepime (MAXIPIME) 1000 mg IVPB minibag, 1,000 mg, IntraVENous, Q24H  insulin regular (HUMULIN R;NOVOLIN R) 100 Units in sodium chloride 0.9 % 100 mL infusion, 0.5 Units/hr, IntraVENous, Continuous  linezolid (ZYVOX) IVPB 600 mg, 600 mg, IntraVENous, Q12H  Venelex ointment, , Topical, BID  albumin human 25 % IV solution 12.5 g, 12.5 g, IntraVENous, PRN  heparin (porcine) injection 2,600 Units, 2,600 Units, IntraCATHeter, PRN  heparin (porcine) injection 5,000 Units, 5,000 Units, SubCUTAneous, 3 times per day  dexmedetomidine (PRECEDEX) 400 mcg in sodium chloride 0.9 % 100 mL infusion, 0.2-2 mcg/kg/hr, IntraVENous, Continuous  midazolam (VERSED) injection 2 mg, 2 mg, IntraVENous, Q1H PRN  midazolam (VERSED) 1 mg/mL in D5W infusion, 1-10 mg/hr, IntraVENous, Continuous  famotidine (PEPCID) injection 20 mg, 20 mg, IntraVENous, Daily  sodium chloride flush 0.9 % injection 5-40 mL, 5-40 mL, IntraVENous, 2 times per day  sodium chloride flush 0.9 % injection 5-40 mL, 5-40 mL, IntraVENous, PRN  0.9 % sodium chloride infusion, 25 mL, IntraVENous, PRN  acetaminophen (TYLENOL) tablet 650 mg, 650 mg, Oral, Q6H PRN **OR** acetaminophen (TYLENOL) suppository 650 mg, 650 mg, Rectal, Q6H PRN  glucose (GLUTOSE) 40 % oral gel 15 g, 15 g, Oral, PRN  glucagon (rDNA) injection 1 mg, 1 mg, IntraMUSCular, PRN  dextrose 5 % solution, 100 mL/hr, IntraVENous, PRN  dextrose bolus (hypoglycemia) 10% 125 mL, 125 mL, IntraVENous, PRN **OR** dextrose bolus (hypoglycemia) 10% 250 mL, 250 mL, IntraVENous, PRN  fentaNYL (SUBLIMAZE) 1,000 mcg in sodium chloride 0.9% 100 mL infusion, 12.5-200 mcg/hr, IntraVENous, Continuous  norepinephrine (LEVOPHED) 16 mg in dextrose 5 % 250 mL infusion, 2-100 mcg/min, IntraVENous, Continuous  chlorhexidine (PERIDEX) 0.12 % solution 15 mL, 15 mL, Mouth/Throat, BID     This is day 9 of Abx for his MSSA infection, day 2 of cefepime and linezolid. Off stress dose steroids now. Allergies: Januvia [sitagliptin], Metformin and related, Vancomycin, and Mustard oil [allyl isothiocyanate]    Pertinent items from the review of systems are discussed in the HPI; the remainder of the ROS was reviewed and is negative.      Objective:     Vital signs over the last 24 hours:  Temp  Av.4 °F (38 °C)  Min: 97.5 °F (36.4 °C)  Max: 102.6 °F (39.2 °C)  Pulse  Av  Min: 66  Max: 95  Systolic (55HZF), ILH:245 , Min:107 , FOS:367   Diastolic (24hrs), Av, Min:57, Max:79  Resp  Av.6  Min: 30  Max: 46  SpO2  Av %  Min: 90 %  Max: 97 %    Constitutional:  well-developed, well-nourished, sedated on the vent, definitely reacts to discomfort today  Psychiatric:  Otherwise unable to assess   Eyes:  pupils equal, round, reactive to light; sclerae anicteric, conjunctivae not pale, no subconj bleed  ENT:  atraumatic; no labial ulcers; orally intubated  Resp:  lungs I think clearer to auscultation BL than lat week, decreased at the bases  Cardiovascular:  heart regular, tachy, decreased heart sounds, no murmur able to be heard; generally mild extremity edema, mild-mod LLE, which is still warmer than the right; no IV phlebitis; right IJ CVC in place, and a right femoral CRRT line, a couple of peripherals  GI:  abdomen soft, NT, mildly distended, hypoactive bowel sounds, no palpable masses or organomegaly  : Munoz in place, small amount of bloody urine  Musculoskeletal:  no clubbing, cyanosis or petechiae; extremities with no gross effusions or acute arthritis, I think stable Charcot deformity overall, but he probably has (abnormally) more left midfoot ROM than he had last year  Skin: warm, dry, normal turgor; no acute rash, no peripheral stigmata of endocarditis. Right lower leg changes chronic and stable; left hand with some lysing eschar, less reactive erythema around the burn, some new granulation tissue, but also some pus expressed from a couple of pockets beneath the edges of the eschar, tracking down to extensor tendon; left foot about as swollen and warm as last week, stable dorsal area of ecchymosis and epidermal lysis, and on manual pressure today more pus than Friday and maybe even more than Thursday, now seems to be tracking from deeper in the midfoot, not just along the 3rd-4th rays; definite bony crepitus on deep palpation and ROM; less bleeding today.   ______________________________    Recent Labs     22  0420 22  0806 01/29/22  0347   WBC 32.2* 26.3* 27.9*   HGB 8.5* 9.0* 10.1*   HCT 26.1* 27.6* 30.8*   MCV 93.0 93.5 93.2    186 182     Lab Results   Component Value Date    CREATININE 6.6 (HH) 01/31/2022     Lab Results   Component Value Date    LABALBU 2.1 (L) 01/31/2022     Lab Results   Component Value Date    ALT 13 01/31/2022     (H) 01/31/2022    ALKPHOS 491 (H) 01/31/2022    BILITOT 0.7 01/31/2022      Lab Results   Component Value Date    LABA1C 10.6 01/23/2022     Other recent pertinent labs:  BUN up to 136. Anion gap up to 19. Glucoses down into the 100s. Alk phos and AST both increased from late last week. WBC diff with only 1 band now, but that WBC count is higher again, even with steroids stopped. ______________________________    Recent pertinent micro results:  New BCx (-) so far, not quite 24 hours yet. Bronch samples with WBCs, no organisms, Cxs pending (WBCs on the bronch wash, not the BAL). ______________________________    Recent imaging results (last 7 days):     XR FOOT LEFT (2 VIEWS)    Result Date: 1/24/2022  1. Remote history of amputation at the 1st and 2nd digits. No evidence of osteomyelitis at prior resection site. 2. Subtle erosions at the 3rd MTP joint are new. This is adjacent to soft tissue swelling at the prior amputation site. A new focus of osteomyelitis is suspected. 3. Soft tissue swelling and questionable subcutaneous gas along the lateral aspect of the left foot. There is also severe disorganization of bone at the 2nd through 5th tarsometatarsal joints. Although pattern may represent Charcot arthropathy due to the more diffuse appearance, areas of osteomyelitis cannot be excluded with plain film. Correlate with physical exam and clinical workup. A follow-up MRI may be helpful for further evaluation. CT HEAD WO CONTRAST    Result Date: 1/28/2022  1. No acute intracranial abnormality.      XR CHEST PORTABLE    Result Date: 1/31/2022  Cardiomegaly with left basilar effusion and bibasilar infiltrates. Stable support tubes. [to me, it looks more like a left effusion, CHF and presumed bibasilar atelectasis, less typical for a pneumonia, but suboptimal view.]    XR CHEST PORTABLE    Result Date: 1/30/2022  1. Stable lines, tubes, and support devices. 2. Bilateral airspace opacities with pleural effusions, left greater than right. 3. Cardiomegaly. XR CHEST PORTABLE    Result Date: 1/29/2022  1. Stable lines, tubes, and support devices. 2. Stable cardiopulmonary status after accounting for differences in patient positioning including bilateral airspace opacities. 3. Cardiomegaly. 4. Low lung volumes. XR CHEST PORTABLE    Result Date: 1/28/2022  CHF with increasing pulmonary edema. XR CHEST PORTABLE    Result Date: 1/27/2022  Patchy airspace disease, left greater than right which may represent atelectasis or pneumonia. XR CHEST PORTABLE    Result Date: 1/26/2022  Stable support apparatus. Increasing bilateral airspace opacities which may be related to edema and/or pneumonia. XR CHEST PORTABLE    Result Date: 1/25/2022  Low lung volumes/poor inspiratory effort limiting the study. No significant improvement. A mild cardiomegaly. Mild congestion and/or infiltrates identified in the lungs. No pneumothorax.       Assessment:     Patient Active Problem List   Diagnosis Code    Hypertension I10    Uncontrolled type 2 diabetes mellitus with diabetic polyneuropathy ([facility] E11.42, E11.65    Cardiomyopathy ([facility] I42.9    Mixed hyperlipidemia E78.2    Hyperglycemia R73.9    Allergic rhinitis J30.9    Osteoarthritis M19.90    Acute renal failure (HCC) N17.9    MSSA bacteremia R78.81, B95.61    Third degree burn of left hand T23.302A    Syncope R55    Acute hypoxemic respiratory failure (HCC) J96.01    Septic shock (HCC) A41.9, R65.21    Cardiopulmonary arrest (HCC) I46.9    Abscess of left foot L02.612    Hypertriglyceridemia E78.1     Assessment of today's active condition(s):      --          Background of uncontrolled DM2, neuropathy, no known PAD, multiple prior diabetic foot ulcers, infections, surgeries. Most recent wounds were at the left 1st and 2nd ray, but they had been healed for just about a year.      --          Admission this time with a fairly nonfocal sepsis syndrome, at least in terms of symptoms, complicated by shock, acute renal failure, lactic acidosis, then cardiac arrest, resuscitation, acute respiratory failure, rapid Afib. Currently with vent support, sedation, heparin, off vasopressors, and off CRRT. Found to have MSSA bacteremia, and a sizeable MSSA foot abscess. Based on how far I'm able to probe, I do have a high suspicion of active osteomyelitis in the foot, though those XR changes are very hard to determine osteo from Charcot changes alone. Ongoing deep purulence despite my small I&D site, a week of Abx, and daily packing changes.      --          Ongoing hypoxemic respiratory failure, I think at least in part due to CHF / fluid overload after cardiac arrest.     --          Third degree burn of left hand, now with some purulence under that eschar, which COULD explain the fever over the weekend, but I think it's more likely from the foot.     --          Multifactorial leukocytosis with impressive left shift, at this point I think from incomplete source control of his infection, when it comes to the left foot. Concerned that the alk phos and AST elevations could all be from the foot as well (bone and muscle infection). New fevers over the weekend, certainly need to work up for line infection, VAP and left hand burn infection, but I think the foot is more likely the main ongoing issue. Treatment recs:     Reasonable to keep on broader ABx for now, but if nothing new on cultures in a couple of days, we can go back to treating the MSSA only.     Wound culture obtained from left hand today, hand re-dressed with Triad, gauze and Kerlix. Left foot re-packed with iodoform, re-dressed with gauze and Kerlix. Add a GGT to the labs (to see if the alk phos might more likely be bony in origin, pointing to the foot) and a CPK (to see if the AST might more likely be muscular in origin). MRI left foot without contrast, after HD today, and once we have the proper ventilator available. I think surgery is more urgent at this point, with the ongoing pus and increased fevers; not sure if it would be deep debridement, partial foot amputation, or he IS at a significant risk of progressing to a BKA. D/W his ICU RN, Dr. Janice Stephens, Dr. Pio Richmond.      Electronically signed by Katherine Cash MD on 1/31/2022 at 7:31 AM.

## 2022-01-31 NOTE — PROGRESS NOTES
Pt placed on PS 8/5.         01/31/22 0951   Vent Information   Vt Ordered 0 mL   Rate Set 0 bmp   Peak Flow 0 L/min   Pressure Support 8 cmH20   FiO2  50 %   SpO2 94 %   SpO2/FiO2 ratio 188   Sensitivity 3   PEEP/CPAP 5   I Time/ I Time % 0 s   Vent Patient Data   High Peep/I Pressure 0   Peak Inspiratory Pressure 15 cmH2O   Mean Airway Pressure 8.4 cmH20   Rate Measured 25 br/min   Vt Exhaled 644 mL   Minute Volume 16.8 Liters   I:E Ratio 1:2.20   Spontaneous Breathing Trial (SBT) RT Doc   Pulse 84   Additional Respiratory  Assessments   Resp 24   Alarm Settings   High Pressure Alarm 45 cmH2O   Low Minute Volume Alarm 3 L/min   High Respiratory Rate 50 br/min

## 2022-01-31 NOTE — PROGRESS NOTES
01/30/22 2322   Vent Information   Vent Type 980   Vent Mode AC/VC   Vt Ordered 430 mL   Rate Set 22 bmp   Peak Flow 100 L/min   Pressure Support 0 cmH20   FiO2  50 %   SpO2 94 %   SpO2/FiO2 ratio 188   Sensitivity 3   PEEP/CPAP 5   I Time/ I Time % 0 s   Vent Patient Data   High Peep/I Pressure 0   Peak Inspiratory Pressure 29 cmH2O   Mean Airway Pressure 12 cmH20   Rate Measured 43 br/min   Vt Exhaled 430 mL   Minute Volume 17.9 Liters   I:E Ratio 1:2.10   Cough/Sputum   Sputum How Obtained Endotracheal   Cough Productive   Sputum Amount Moderate   Sputum Color Creamy; Yellow   Tenacity Thick   Spontaneous Breathing Trial (SBT) RT Doc   Pulse 79   Breath Sounds   Right Upper Lobe Diminished   Right Middle Lobe Diminished   Right Lower Lobe Diminished   Left Upper Lobe Diminished   Left Lower Lobe Diminished   Additional Respiratory  Assessments   Resp (!) 42   Alarm Settings   High Pressure Alarm 45 cmH2O   Low Minute Volume Alarm 3 L/min   Apnea (secs) 20 secs   High Respiratory Rate 50 br/min   Low Exhaled Vt  300 mL   Patient Observation   Observations 7.5 ett 24 at lip

## 2022-01-31 NOTE — PROGRESS NOTES
110 ml of Fentanyl wasted w/ 2 RNs    Electronically signed by Rodriguez Mercedes RN on 1/31/2022 at 5:10 PM

## 2022-01-31 NOTE — PROGRESS NOTES
Pulmonary & Critical Care Medicine ICU Progress Note    CC: Septic shock, MSSA bacteremia, left foot abscess    Events of Last 24 hours:    Febrile  Insulin infusion    Vascular lines:    Right IJ 1/23  Right femoral Vas-Cath 1/23    MV: 1/23/22  Vent Mode: AC/VC Rate Set: 22 bmp/Vt Ordered: 430 mL/ /FiO2 : 50 %  Recent Labs     01/30/22  0415 01/31/22  0420   PHART 7.451* 7.365   RTT7VXT 25.1* 29.7*   PO2ART 66.1* 64.5*       IV:   insulin 5.16 Units/hr (01/31/22 0610)    dexmedetomidine HCl in NaCl 1.8 mcg/kg/hr (01/31/22 0611)    midazolam Stopped (01/27/22 2352)    sodium chloride 5 mL/hr at 01/31/22 0610    dextrose      fentaNYL Stopped (01/29/22 0940)    norepinephrine Stopped (01/26/22 2254)       Vitals:  Blood pressure (!) 111/57, pulse 78, temperature 97.5 °F (36.4 °C), temperature source Bladder, resp. rate 30, height 6' 1\" (1.854 m), weight 289 lb 11 oz (131.4 kg), SpO2 94 %. on vent    Intake/Output Summary (Last 24 hours) at 1/31/2022 8749  Last data filed at 1/31/2022 0610  Gross per 24 hour   Intake 3509.1 ml   Output 106 ml   Net 3403.1 ml     General: intubated, ill appearing    ENT: Pharynx with ETT. Resp: No crackles. No wheezing. CV: S1, S2. + edema  GI: NT, ND, +BS  Skin: Warm and dry. Neuro: PERRL. Grimaces to pain. Sedated, not following commands.  Patellar reflexes are symmetric    Scheduled Meds:   cefepime  1,000 mg IntraVENous Q24H    linezolid  600 mg IntraVENous Q12H    Venelex   Topical BID    heparin (porcine)  5,000 Units SubCUTAneous 3 times per day    famotidine (PEPCID) injection  20 mg IntraVENous Daily    sodium chloride flush  5-40 mL IntraVENous 2 times per day    chlorhexidine  15 mL Mouth/Throat BID       Data:  CBC:   Recent Labs     01/29/22  0347 01/30/22  0415 01/31/22  0420   WBC 27.9* 26.3* 32.2*   HGB 10.1* 9.0* 8.5*   HCT 30.8* 27.6* 26.1*   MCV 93.2 93.5 93.0    186 201     BMP:   Recent Labs     01/29/22  0347 01/30/22  0415 01/31/22  0420   * 133* 134*   K 4.2 4.8 5.0   CL 95* 98* 98*   CO2 19* 18* 17*   PHOS 4.1 6.0* 8.6*   BUN 93* 98* 136*   CREATININE 4.2* 5.1* 6.6*     LIVER PROFILE:   Recent Labs     01/29/22  0347 01/30/22  0415 01/31/22  0420   * 83* 151*   ALT 14 9* 13   BILIDIR 0.5* 0.4* 0.4*   BILITOT 0.8 0.8 0.7   ALKPHOS 467* 518* 491*       Microbiology:  1/22 [facility] MSSA  1/22 COVID-19 and influenza negative  1/23/22 Blood cx: NGTD  1/23 tracheal aspirate MSSA  1/24 Wound cx: MSSA  1/24 BC MSSA  1/29/22 BAL pending  1/30/2022 blood sent    Echo 1/25/22: EF 35%, global HK, reduced RV function    CXR 1/31/22 left basilar effusion    ASSESSMENT:  · Acute hypoxemic respiratory failure   · Septic shock    · Anoxic encephalopathy  · MSSA bacteremia  · Hypertriglyceridemia   · L foot abscess drained 1/24/2022, MSSA  · Cardiopulmonary arrest x 2 1/23/2022, required CPR  · AFIB - rate controlled   · Cardiomyopathy EF 35% on Echo 1/24/22  · Acute pulmonary edema/fluid overload  · Acute kidney failure  · DM with hyperglycemia  · Left hand burn  · History of cardiomyopathy - last echo 2014 EF 50%     PLAN:  Mechanical ventilation as per my orders. The ventilator was adjusted by me at the bedside for unstable, life threatening respiratory failure.   · Titrate precedex to off    · PRN sedatives only for ventilator tolerance  · Insulin infusion  · Was on IV Ancef per infectious disease; was changed to cefepime and Zyvox due to persistent fevers  · EEG to r/o status  · TTM - rewarmed 8pm 1/25/22  · Nephrology following for HD  · TF  · Lantus 55 BID and ISS   · Prophylaxis: SQ heparin, Pepcid  · I d/w mother of patient at bedside    Total critical care time caring for this patient with life threatening, unstable organ failure, including direct patient contact, management of life support systems, review of data including imaging and labs, discussions with other team members and physicians is 35 minutes so far today, excluding procedures.

## 2022-01-31 NOTE — PROGRESS NOTES
Patient temp now reading 102. 6. 500mg Tylenol X1 and ice packs applied to axilla and groin per Dr. Amber Rosales. RR 43, Versed 2mg administered. Will monitor efficacy.

## 2022-01-31 NOTE — PROGRESS NOTES
Patient care assumed, assessment completed as charted. Patient continues on ventilator, #7.5 at the 25 lip line. A/C 22, , FiO2 50%, PEEP 5. Right IJ CVC in place with Precedex  Infusing at 1.6mcg/kg/hr and Insulin per protocol. right femoral VasCath in place. OG in place with tube feed running at 90mL/hr, rea catheter patent, bilateral wrist restraints in place for patient safety. Dressings intact to left hand and left foot. Bladder temp 102.5 currently, patient received Tylenol at 1817 - [employer] message sent to Dr. Henry Newman regarding. No further needs assessed at this time. Will monitor.

## 2022-01-31 NOTE — PROGRESS NOTES
Pt remains on PS 8/5  FiO2 50%         01/31/22 1218   Vent Information   Vt Ordered 0 mL   Rate Set 0 bmp   Peak Flow 0 L/min   Pressure Support 8 cmH20   FiO2  50 %   SpO2 95 %   SpO2/FiO2 ratio 190   Sensitivity 3   PEEP/CPAP 5   I Time/ I Time % 0 s   Humidification Source Heated wire   Humidification Temp Measured 37   Vent Patient Data   High Peep/I Pressure 0   Peak Inspiratory Pressure 15 cmH2O   Mean Airway Pressure 8.8 cmH20   Rate Measured 35 br/min   Vt Exhaled 520 mL   Minute Volume 18.2 Liters   I:E Ratio 1:2.00   Cough/Sputum   Sputum How Obtained Endotracheal   Cough Productive   Sputum Amount Moderate   Sputum Color Creamy; White   Tenacity Thick   Spontaneous Breathing Trial (SBT) RT Doc   Pulse 86   Breath Sounds   Right Upper Lobe Rhonchi   Right Middle Lobe Diminished   Right Lower Lobe Diminished   Left Upper Lobe Rhonchi   Left Lower Lobe Diminished   Additional Respiratory  Assessments   Resp (!) 35   Position Semi-Springer's   Oral Care Completed? No   Oral Care Mouth suctioned   Subglottic Suction Done?  Yes   Alarm Settings   High Pressure Alarm 45 cmH2O   Low Minute Volume Alarm 3 L/min   High Respiratory Rate 50 br/min   ETT (adult)   Placement Date: 01/23/22   Preoxygenation: Yes  Technique: Direct laryngoscopy  Type: Cuffed  Tube Size: 7.5 mm  Insertion attempts: 1  Secured at: 24 cm  Measured From: Lips   Secured at 25 cm   Measured From [facility] [address] By [employer] tube wheeler   Site Condition Dry

## 2022-01-31 NOTE — CONSULTS
Attempted to see for questionable DTI to sacrum, pt off unit for MRI. Pt being followed by Dr Jignesh Mejia and Dr Marley Zheng for foot and hand wounds. Will see pt tomorrow.

## 2022-01-31 NOTE — PROGRESS NOTES
Progress Note    HISTORY     CC:  AMS          We are following for acute kidney injury       Subjective/   HPI:    Remains in the ICU. Not waking up. IHD tolerated. Seen on dialysis. Orders confirmed. Pre-weight at HD today 131.4 kg. Not making urine or showing signs of renal recovery    ROS:  Constitutional:  + fevers  Respiratory: On vent  :  Anuric     Social Hx:  Family at the bedside     Past Medical and Surgical History:  - Reviewed, no changes     EXAM       Objective/     Vitals:    01/31/22 0856 01/31/22 0900 01/31/22 0951 01/31/22 1000   BP:  109/62  (!) 104/53   Pulse:  80 84 83   Resp:  27 24 25   Temp:    99.4 °F (37.4 °C)   TempSrc:    Bladder   SpO2:  97% 94% 95%   Weight:       Height: 6' 1\" (1.854 m)        24HR INTAKE/OUTPUT:      Intake/Output Summary (Last 24 hours) at 1/31/2022 1020  Last data filed at 1/31/2022 0610  Gross per 24 hour   Intake 3509.1 ml   Output 95 ml   Net 3414.1 ml     Constitutional:  Critically ill   Eyes:  Pupils reactive, sclera clear   Neck:  Normal thyroid, no masses   Cardiovascular:  Regular, no rub; + LE edema  Respiratory:   On vent, no wheezing  Psychiatry:  Sedated on vent   Abdomen: +bs, soft, nt, no masses   Musculoskeletal: No LE edema, no clubbing   Lymphatics:  No LAD in neck, no supraclavicular nodes       MEDICAL DECISION MAKING       Data/  Recent Labs     01/29/22 0347 01/30/22  0415 01/31/22  0420   WBC 27.9* 26.3* 32.2*   HGB 10.1* 9.0* 8.5*   HCT 30.8* 27.6* 26.1*   MCV 93.2 93.5 93.0    186 201     Recent Labs     01/29/22 0347 01/30/22  0415 01/31/22  0420   * 133* 134*   K 4.2 4.8 5.0   CL 95* 98* 98*   CO2 19* 18* 17*   GLUCOSE 416* 379* 133*   PHOS 4.1 6.0* 8.6*   MG 2.60* 2.60*  --    BUN 93* 98* 136*   CREATININE 4.2* 5.1* 6.6*   LABGLOM 15* 12* 9*   GFRAA 19* 15* 11*       Assessment/     RODRICK likely related to prerenal factors in the setting of sepsis, use of diuretics and losartan contributing to multifactorial ATN. UA is not suggestive of staph associated glomerulonephritis.   Patient did not respond to IV fluid and developed acute pulmonary edema and renal replacement therapy initiated on 1/23/22   No signs of renal recovery, now on HD MWF schedule     -Acute pulmonary edema              Status post intubation   On vent      -Acute hypoxic respiratory failure     -Cardio respiratory arrest      -Sepsis with MSSA bacteremia     -Morbid obesity     -Diabetes, uncontrolled    Plan/     HD today with UF as tolerated  Follow labs  Does not appear he will quickly recover so likely to need TDC placed but will need fevers to resolve

## 2022-01-31 NOTE — PROGRESS NOTES
Hospitalist Progress Note      PCP: Melissa Matthew MD    Date of Admission: 1/22/2022     Admitted with acute hypoxic respiratory failure, septic shock and MSSA bacteremia  Left foot abscess drained 1/24/2022  Cardiopulmonary arrest x2 on 1/23/2022, required CPR  Went with acute kidney injury currently on CRRT    Subjective: on CRRT, still on levophed  Off epinephrine  On insulin drip    1/27  Failed SBT    CRRT stopped-Lasix 100 mg IV x1 given  Off levophed     1/28  Not making urine despite receiving Lasix 100 mg IV  Increased tachypnea and hypoxia off sedation  On Precedex    1/29  Currently on hemodialysis  On Precedex only. Off all the sedation. Doing well on SBT, however not waking up. Fever     1/30  Currently only on Precedex. Not following any commands or having any purposeful movements. T-max 101.2    1/31- Spiked fevers over the weekend. On the ventilator. Not following commands. Mom at bedside. She said he has opened his eyes.       Medications:  Reviewed    Infusion Medications    insulin 10.68 Units/hr (01/31/22 1136)    dexmedetomidine HCl in NaCl 1.6 mcg/kg/hr (01/31/22 1140)    sodium chloride 5 mL/hr at 01/31/22 0610    dextrose       Scheduled Medications    calcium chloride IVPB  1,000 mg IntraVENous Once    cefepime  1,000 mg IntraVENous Q24H    linezolid  600 mg IntraVENous Q12H    Venelex   Topical BID    heparin (porcine)  5,000 Units SubCUTAneous 3 times per day    famotidine (PEPCID) injection  20 mg IntraVENous Daily    sodium chloride flush  5-40 mL IntraVENous 2 times per day    chlorhexidine  15 mL Mouth/Throat BID     PRN Meds: albumin human, heparin (porcine), midazolam, sodium chloride flush, sodium chloride, acetaminophen **OR** acetaminophen, glucose, glucagon (rDNA), dextrose, dextrose bolus (hypoglycemia) **OR** dextrose bolus (hypoglycemia)      Intake/Output Summary (Last 24 hours) at 1/31/2022 1201  Last data filed at 1/31/2022 1130  Gross per 24 hour   Intake 4109.1 ml   Output 2476 ml   Net 1633.1 ml       Physical Exam Performed:    /62   Pulse 88   Temp 99.9 °F (37.7 °C)   Resp 30   Ht 6' 1\" (1.854 m)   Wt 285 lb 0.9 oz (129.3 kg)   SpO2 90%   BMI 37.61 kg/m²       Gen: intubated. Ill appearing. Eyes: PERRL. No sclera icterus. No conjunctival injection. ENT: No discharge. Pharynx clear. Neck: Trachea midline. Normal thyroid. Resp:+ accessory muscle use. + crackles. No wheezes. No rhonchi. CV: INCREASED rate. Regular rhythm. No murmur or rub. No edema. GI: Non-tender. Non-distended. No masses. No organomegaly. Normal bowel sounds. No hernia. Skin: burns left hand with some yellow slough. Chronic skin changes both legs.   Lymph: No cervical LAD. No supraclavicular LAD. M/S: No cyanosis. No joint deformity. No clubbing. Missing right big toe  Neuro: Intubated, on Precedex. .    Labs:   Recent Labs     01/29/22 0347 01/30/22 0415 01/31/22  0420   WBC 27.9* 26.3* 32.2*   HGB 10.1* 9.0* 8.5*   HCT 30.8* 27.6* 26.1*    186 201     Recent Labs     01/29/22 0347 01/30/22 0415 01/31/22  0420   * 133* 134*   K 4.2 4.8 5.0   CL 95* 98* 98*   CO2 19* 18* 17*   BUN 93* 98* 136*   CREATININE 4.2* 5.1* 6.6*   CALCIUM 7.6* 7.8* 8.0*   PHOS 4.1 6.0* 8.6*     Recent Labs     01/29/22 0347 01/30/22  0415 01/31/22  0420   * 83* 151*   ALT 14 9* 13   BILIDIR 0.5* 0.4* 0.4*   BILITOT 0.8 0.8 0.7   ALKPHOS 467* 518* 491*     No results for input(s): INR in the last 72 hours. Recent Labs     01/31/22  0420   CKTOTAL 676*       Urinalysis:      Lab Results   Component Value Date    NITRU Negative 01/22/2022    WBCUA 0-2 01/22/2022    BACTERIA Rare 01/22/2022    RBCUA 0-2 01/22/2022    BLOODU TRACE-LYSED 01/22/2022    SPECGRAV >=1.030 01/22/2022    GLUCOSEU 500 01/22/2022       Radiology:  XR CHEST PORTABLE   Final Result   Cardiomegaly with left basilar effusion and bibasilar infiltrates. Stable support tubes.          XR CHEST PORTABLE   Final Result   1. Stable lines, tubes, and support devices. 2. Bilateral airspace opacities with pleural effusions, left greater than   right. 3. Cardiomegaly. XR CHEST PORTABLE   Final Result   1. Stable lines, tubes, and support devices. 2. Stable cardiopulmonary status after accounting for differences in patient   positioning including bilateral airspace opacities. 3. Cardiomegaly. 4. Low lung volumes. CT HEAD WO CONTRAST   Final Result   1. No acute intracranial abnormality. XR CHEST PORTABLE   Final Result   CHF with increasing pulmonary edema. XR CHEST PORTABLE   Final Result   Patchy airspace disease, left greater than right which may represent   atelectasis or pneumonia. XR CHEST PORTABLE   Final Result   Stable support apparatus. Increasing bilateral airspace opacities which may be related to edema and/or   pneumonia. XR CHEST PORTABLE   Final Result   Low lung volumes/poor inspiratory effort limiting the study. No significant improvement. A mild cardiomegaly. Mild congestion and/or   infiltrates identified in the lungs. No pneumothorax. XR FOOT LEFT (2 VIEWS)   Final Result   1. Remote history of amputation at the 1st and 2nd digits. No evidence of   osteomyelitis at prior resection site. 2. Subtle erosions at the 3rd MTP joint are new. This is adjacent to soft   tissue swelling at the prior amputation site. A new focus of osteomyelitis   is suspected. 3. Soft tissue swelling and questionable subcutaneous gas along the lateral   aspect of the left foot. There is also severe disorganization of bone at the   2nd through 5th tarsometatarsal joints. Although pattern may represent   Charcot arthropathy due to the more diffuse appearance, areas of   osteomyelitis cannot be excluded with plain film. Correlate with physical   exam and clinical workup. A follow-up MRI may be helpful for further   evaluation. XR CHEST PORTABLE   Final Result   Low lung volumes with cardiomegaly and vascular congestion, as well as patchy   airspace disease bilaterally, similar to the previous exam.         XR CHEST PORTABLE   Final Result   Status post advancement of right internal jugular central line with distal   tip now visualized near region of junction of superior vena cava and right   atrium. No evidence of pneumothorax. XR CHEST PORTABLE   Final Result   Improved lung volumes. Bilateral perihilar opacification, edema versus   infiltrate. Satisfactory position of endotracheal tube. Central line tip in either the   distal brachiocephalic vein or proximal SVC. XR CHEST PORTABLE   Final Result   Cardiomegaly with left mid and lower lung infiltrates. Support tubes as described above. XR CHEST 1 VIEW   Final Result   Limited chest as outlined above. Bilateral perihilar opacification, edema   versus infiltrate. XR CHEST PORTABLE   Final Result   Low lung volumes. No acute cardiopulmonary disease. XR CHEST PORTABLE    (Results Pending)   MRI FOOT LEFT WO CONTRAST    (Results Pending)           Assessment/Plan:    Principal Problem:    Septic shock ([address]  Active Problems:    Uncontrolled type 2 diabetes mellitus with diabetic polyneuropathy (HCC)    Cardiomyopathy ([address]    Mixed hyperlipidemia    Hyperglycemia    Acute renal failure (HCC)    MSSA bacteremia    Third degree burn of left hand    Syncope    Acute hypoxemic respiratory failure (HCC)    Cardiopulmonary arrest (HCC)    Abscess of left foot    Hypertriglyceridemia  Resolved Problems:    Acute respiratory failure with hypoxia (HCC)    Leucocytosis    Acute kidney injury ([address]    Uremia         #MSSA bacteremia. MSSA foot abscess. Septic shock. started Ancef.  ID consultation. [facility] reviewed. Currently on Levophed. Epinephrine DC'd. Continue Solu-Cortef. now off levo  Antibiotics changed to IV cefepime and

## 2022-01-31 NOTE — PROGRESS NOTES
01/30/22 1938   Vent Information   Vent Type 980   Vent Mode AC/VC   Vt Ordered 430 mL   Rate Set 22 bmp   Peak Flow 100 L/min   Pressure Support 0 cmH20   FiO2  50 %   SpO2 94 %   SpO2/FiO2 ratio 188   Sensitivity 3   PEEP/CPAP 5   I Time/ I Time % 0 s   Humidification Source Heated wire   Humidification Temp Measured 37   Circuit Condensation Drained   Vent Patient Data   High Peep/I Pressure 0   Peak Inspiratory Pressure 19 cmH2O   Mean Airway Pressure 7.1 cmH20   Rate Measured 42 br/min   Vt Exhaled 436 mL   Minute Volume 18.4 Liters   I:E Ratio 1:2.00   Plateau Pressure 17 [id number]   Static Compliance 36 mL/cmH2O   Dynamic Compliance 28 mL/cmH2O   Cough/Sputum   Sputum How Obtained Endotracheal   Cough Productive   Sputum Amount Moderate   Sputum Color Creamy   Tenacity Thick   Spontaneous Breathing Trial (SBT) RT Doc   Pulse 90   Breath Sounds   Right Upper Lobe Diminished   Right Middle Lobe Diminished   Right Lower Lobe Diminished   Left Upper Lobe Diminished   Left Lower Lobe Diminished   Additional Respiratory  Assessments   Resp (!) 43   Alarm Settings   High Pressure Alarm 45 cmH2O   Low Minute Volume Alarm 3 L/min   Apnea (secs) 20 secs   High Respiratory Rate 50 br/min   Low Exhaled Vt  300 mL   Patient Observation   Observations 7.5 ett 24 at lip

## 2022-01-31 NOTE — PROGRESS NOTES
01/31/22 0320   Vent Information   Vent Type 980   Vent Mode AC/VC   Vt Ordered 430 mL   Rate Set 22 bmp   Peak Flow 100 L/min   Pressure Support 0 cmH20   FiO2  50 %   SpO2 90 %   SpO2/FiO2 ratio 180   Sensitivity 3   PEEP/CPAP 5   I Time/ I Time % 0 s   Humidification Source Heated wire   Humidification Temp Measured 37   Circuit Condensation Drained   Vent Patient Data   High Peep/I Pressure 0   Peak Inspiratory Pressure 24 cmH2O   Mean Airway Pressure 11 cmH20   Rate Measured 36 br/min   Vt Exhaled 412 mL   Minute Volume 16.5 Liters   I:E Ratio 1:2.40   Cough/Sputum   Sputum How Obtained Endotracheal   Cough Productive   Sputum Amount Moderate   Sputum Color Yellow   Tenacity Thick   Spontaneous Breathing Trial (SBT) RT Doc   Pulse 72   Breath Sounds   Right Upper Lobe Diminished   Right Middle Lobe Diminished   Right Lower Lobe Diminished   Left Upper Lobe Diminished   Left Lower Lobe Diminished   Additional Respiratory  Assessments   Resp (!) 33   Alarm Settings   High Pressure Alarm 45 cmH2O   Low Minute Volume Alarm 3 L/min   Apnea (secs) 20 secs   High Respiratory Rate 50 br/min   Low Exhaled Vt  300 mL   Patient Observation   Observations 7.5 ett 24 at lip

## 2022-01-31 NOTE — PROGRESS NOTES
Treatment time: 210min    Net UF: 1800ml    Pre weight: 131.4k  Post weight: 129.3k  EDW: TBD    Access used:R fem vas cath  Access function: Good    Medications or blood products given: None    Regular outpatient schedule: TBD    Summary of response to treatment: Stable tx . Copy of dialysis treatment record placed in chart, to be scanned into EMR.

## 2022-02-01 PROBLEM — M00.072 STAPHYLOCOCCAL ARTHRITIS OF LEFT FOOT (HCC): Status: ACTIVE | Noted: 2022-01-01

## 2022-02-01 PROBLEM — K52.1 ANTIBIOTIC-ASSOCIATED DIARRHEA: Status: ACTIVE | Noted: 2022-01-01

## 2022-02-01 PROBLEM — T36.95XA ANTIBIOTIC-ASSOCIATED DIARRHEA: Status: ACTIVE | Noted: 2022-01-01

## 2022-02-01 NOTE — ANESTHESIA PRE PROCEDURE
Department of Anesthesiology  Preprocedure Note       Name:  General Gama   Age:  42 y.o.  :  1975                                          MRN:  [id number]         Date:  2022      Surgeon: Alexandro Fink):  Nubia Stark DPM    Procedure: Procedure(s):  ULCER DEBRIDEMENT LEFT FOOT    Medications prior to admission:   Prior to Admission medications    Medication Sig Start Date End Date Taking? Authorizing Provider   Insulin Degludec (TRESIBA FLEXTOUCH) 200 UNIT/ML SOPN INJECT 76 UNITS INTO THE SKIN NIGHTLY 22  Yes Galileo Du MD   losartan (COZAAR) 50 MG tablet TAKE 1 TABLET BY MOUTH DAILY 22  Yes Galileo Du MD   tiZANidine (ZANAFLEX) 4 MG tablet TAKE 2 TABLETS BY MOUTH NIGHTLY 22  Yes Galileo Du MD   carvedilol (COREG) 12.5 MG tablet TAKE 1 TABLET BY MOUTH 2 TIMES DAILY (WITH MEALS) 21  Yes Galileo Du MD   rosuvastatin (CRESTOR) 20 MG tablet TAKE 1 TABLET BY MOUTH NIGHTLY 21  Yes Samantha Fontenot MD   traZODone (DESYREL) 50 MG tablet TAKE 1 TABLET BY MOUTH NIGHTLY 21  Yes Galileo Du MD   furosemide (LASIX) 20 MG tablet TAKE 1 TABLET BY MOUTH DAILY 21  Yes Galileo Du MD   gabapentin (NEURONTIN) 400 MG capsule TAKE 2 CAPSULES BY MOUTH 3 TIMES DAILY FOR 90 DAYS. 21 Yes Galileo Du MD   insulin lispro (HUMALOG KWIKPEN) 200 UNIT/ML SOPN pen Inject 25 Units into the skin 3 times daily (before meals) 10/14/21  Yes Galileo Du MD   glimepiride (AMARYL) 4 MG tablet TAKE 1 TABLET BY MOUTH EVERY MORNING (BEFORE BREAKFAST). 9/10/21  Yes Galileo Du MD   PARoxetine (PAXIL) 10 MG tablet TAKE 1 TABLET BY MOUTH EVERY MORNING 9/10/21  Yes Galileo Du MD   blood glucose test strips (ONETOUCH ULTRA) strip USE TO TEST BLOOD GLUCOSE DAILY. DX:E11.9 3/5/21  Yes Galileo Du MD   Blood Glucose Monitoring Suppl (CONTOUR NEXT EZ) w/Device KIT Use to test glucose daily. DX: E11.9 12/10/20  Yes Jh Chapman MD   Insulin Pen Needle 32G X 6 MM MISC Use with insulin daily . DX:E11.9 12/4/20  Yes Jh Chapman MD   blood glucose monitor strips Use to test blood sugar daily.   DX:E11.9 12/4/20  Yes Jh Chapman MD   oxymetazoline (AFRIN) 0.05 % nasal spray 2 sprays by Nasal route daily Indications: prior to HBO   Yes Historical Provider, MD   loratadine (CLARITIN) 10 MG tablet Take 10 mg by mouth nightly as needed    Yes Historical Provider, MD   sildenafil (VIAGRA) 100 MG tablet TAKE 1 TABLET BY MOUTH AS NEEDED FOR ERECTILE DYSFUNCTION 8/27/19  Yes Jh Chapman MD       Current medications:    Current Facility-Administered Medications   Medication Dose Route Frequency Provider Last Rate Last Admin    nafcillin 2,000 mg in dextrose 5 % 100 mL IVPB  2,000 mg IntraVENous Q4H Zhao Patton MD        fentaNYL (SUBLIMAZE) injection 50 mcg  50 mcg IntraVENous Q1H PRN Isabel Egan MD   50 mcg at 01/31/22 1451    insulin regular (HUMULIN R;NOVOLIN R) 100 Units in sodium chloride 0.9 % 100 mL infusion  0.5 Units/hr IntraVENous Continuous Brian Jean MD 4.8 mL/hr at 02/01/22 0800 4.76 Units/hr at 02/01/22 0800    Venelex ointment   Topical BID Luana Luna MD   Given at 02/01/22 0808    albumin human 25 % IV solution 12.5 g  12.5 g IntraVENous PRN Dennis Good  mL/hr at 01/29/22 1047 12.5 g at 01/29/22 1047    heparin (porcine) injection 2,600 Units  2,600 Units IntraCATHeter PRN Dennis Good MD   2,600 Units at 01/31/22 1043    heparin (porcine) injection 5,000 Units  5,000 Units SubCUTAneous 3 times per day Luana Luna MD   5,000 Units at 02/01/22 0604    dexmedetomidine (PRECEDEX) 400 mcg in sodium chloride 0.9 % 100 mL infusion  0.2-2 mcg/kg/hr IntraVENous Continuous Luana Luna MD 46.9 mL/hr at 02/01/22 0952 1.4 mcg/kg/hr at 02/01/22 0952    midazolam (VERSED) injection 2 mg  2 mg IntraVENous Q1H PRN Cassy Mccray MD   2 mg at 01/31/22 1842    famotidine (PEPCID) injection 20 mg  20 mg IntraVENous Daily Lorena Coley MD   20 mg at 02/01/22 0807    sodium chloride flush 0.9 % injection 5-40 mL  5-40 mL IntraVENous 2 times per day Mary Rosenbaum MD   10 mL at 02/01/22 0808    sodium chloride flush 0.9 % injection 5-40 mL  5-40 mL IntraVENous PRN Mary Rosenbaum MD        0.9 % sodium chloride infusion  25 mL IntraVENous PRN Mary Rosenbaum MD 5 mL/hr at 02/01/22 0952 NoRateChange at 02/01/22 0952    acetaminophen (TYLENOL) tablet 650 mg  650 mg Oral Q6H PRN Mary Rosenbaum MD   650 mg at 01/31/22 1638    Or    acetaminophen (TYLENOL) suppository 650 mg  650 mg Rectal Q6H PRN Mary Rosenbaum MD        glucose (GLUTOSE) 40 % oral gel 15 g  15 g Oral PRN Mary Rosenbaum MD        glucagon (rDNA) injection 1 mg  1 mg IntraMUSCular PRN Mary Rosenbaum MD        dextrose 5 % solution  100 mL/hr IntraVENous PRN Mary Rosenbaum MD        dextrose bolus (hypoglycemia) 10% 125 mL  125 mL IntraVENous PRSAHIL Rosenbaum MD        Or    dextrose bolus (hypoglycemia) 10% 250 mL  250 mL IntraVENous PRN Mary Rosenbaum MD        chlorhexidine (PERIDEX) 0.12 % solution 15 mL  15 mL Mouth/Throat BID Isra Varghese MD   15 mL at 02/01/22 0807       Allergies: Allergies   Allergen Reactions    Januvia [Sitagliptin] Nausea Only     Has taken metformin without side effects in the past.  Nausea with Janumet in the past.     Metformin And Related      GI Upset    Vancomycin      Pt had red face, swelling itching of eyelids, sore throat after receiving vancmomyin and cefepime. I think this was a histamine releasing reaction from vancomycin most likely.  The cefepime was switched to Zosyn and patient had no reaction to Zosyn    Mustard [employer] Isothiocyanate] Swelling and Rash       Problem List:    Patient Active Problem List   Diagnosis Code    Hypertension I10  Uncontrolled type 2 diabetes mellitus with diabetic polyneuropathy ([facility]) E11.42, E11.65    Cardiomyopathy ([address] I42.9    Mixed hyperlipidemia E78.2    Allergic rhinitis J30.9    Osteoarthritis M19.90    Acute osteomyelitis of left foot ([address] M86.172    Acute renal failure ([facility]) N17.9    MSSA bacteremia R78.81, B95.61    Leukocytosis D72.829    Third degree burn of left hand T23.302A    Acute hypoxemic respiratory failure ([facility]) J96.01    Cardiopulmonary arrest ([facility]) I46.9    Abscess of left foot L02.612    Hypertriglyceridemia E78.1    Staphylococcal arthritis of left foot ([facility]) M00.072    Antibiotic-associated diarrhea K52.1, T36.95XA       Past Medical History:        Diagnosis Date    Acute osteomyelitis of right hallux ([facility]) 08/2019    Cellulitis of left foot 7/26/2020    CHF (congestive heart failure) ([address] 09/2014    presumably from viral myocarditis    Chronic osteomyelitis of left foot ([address] 10/27/2020    Closed displaced fracture of distal phalanx of right little finger 4/8/2021    Clostridium difficile infection 11/01/2016    Diabetic ulcer of left forefoot associated with type 2 diabetes mellitus, with necrosis of bone ([address] 8/1/2019    Diabetic ulcer of right great toe associated with type 2 diabetes mellitus, with necrosis of bone ([address] 08/2019    Failed soft 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 toe amputation site 10/26/2020    History of hyperbaric oxygen therapy 10/28/2020    [facility]- [facility] - compromised flap    Migraine     Possible perforated tympanic membrane     as a result of recurrent otitis    Post-op hematoma of left foot 11/12/2020    Recurrent otitis media     Septic shock ([facility])     Syncope     Tear of medial meniscus of left knee     Tobacco use     Toe osteomyelitis, left ([address] 7/24/2020       Past Surgical History:        Procedure Laterality Date    KNEE ARTHROSCOPY Left [id number]    LEFT KNEE ARTHROSCOPY , SYNOVECTOMY       OTHER SURGICAL HISTORY Left 07/24/2020 PARTIAL LEFT FOOT AMPUTATION    TOE AMPUTATION Right 2019    PARTIAL RIGHT FOOT AMPUTATION performed by Ji Maddox DPM at [address] TOE AMPUTATION Left 2020    PARTIAL LEFT FOOT AMPUTATION performed by Ji Maddox DPM at [address] TOE AMPUTATION Left 10/22/2020    PARTIAL LEFT FOOT AMPUTATION WITH GRAFT APPLICATION performed by Ji Maddox DPM at [address]      WISDOM TOOTH EXTRACTION         Social History:    Social History     Tobacco Use    Smoking status: Former Smoker     Packs/day: 0.50     Years: 2.00     Pack years: 1.00     Quit date: 2005     Years since quittin.4    Smokeless tobacco: Former User     Quit date: 2005    Tobacco comment: SMOKED OCCASIONALLY UNTIL 8 YEARS AGO   Substance Use Topics    Alcohol use: Yes     Comment: RARELY                                Counseling given: Not Answered  Comment: SMOKED OCCASIONALLY UNTIL 8 YEARS AGO      Vital Signs (Current):   Vitals:    22 0754 22 0800 22 0832 22 0900   BP:  (!) 151/82  (!) 159/81   Pulse:  69 73 76   Resp:     Temp: 98.4 °F (36.9 °C)      TempSrc: Bladder      SpO2:  97% 95% 95%   Weight:       Height:                                                  BP Readings from Last 3 Encounters:   22 (!) 159/81   10/14/21 (!) 130/90   21 (!) 130/92       NPO Status:                                                                                 BMI:   Wt Readings from Last 3 Encounters:   22 287 lb 14.7 oz (130.6 kg)   10/14/21 282 lb (127.9 kg)   21 276 lb (125.2 kg)     Body mass index is 37.99 kg/m².     CBC:   Lab Results   Component Value Date    WBC 32.0 2022    RBC 2.66 2022    HGB 8.1 2022    HCT 24.4 2022    MCV 91.7 2022    RDW 14.3 2022     2022       CMP:   Lab Results   Component Value Date     2022    K 5.6 2022    K 3.7 2022    CL 97 2022    CO2 17 02/01/2022     02/01/2022    CREATININE 6.1 02/01/2022    GFRAA 12 02/01/2022    AGRATIO 0.8 01/22/2022    LABGLOM 10 02/01/2022    LABGLOM 103 09/14/2015    GLUCOSE 152 02/01/2022    PROT 5.8 02/01/2022    CALCIUM 8.2 02/01/2022    BILITOT 0.6 02/01/2022    ALKPHOS 437 02/01/2022    AST 63 02/01/2022    ALT 6 02/01/2022       POC Tests:   Recent Labs     02/01/22  0906   POCGLU 150*       Coags:   Lab Results   Component Value Date    PROTIME 15.5 01/26/2022    INR 1.35 01/26/2022    APTT 67.1 01/24/2022       HCG (If Applicable): No results found for: PREGTESTUR, PREGSERUM, HCG, HCGQUANT     ABGs:   Lab Results   Component Value Date    PHART 7.419 02/01/2022    PO2ART 73.6 02/01/2022    NKI8HWA 26.9 02/01/2022    UDZ5YSK 17.0 02/01/2022    BEART -6.2 02/01/2022    E6MITXKO 95.3 02/01/2022        Type & Screen (If Applicable):  No results found for: LABABO, LABRH    Drug/Infectious Status (If Applicable):  No results found for: HIV, HEPCAB    COVID-19 Screening (If Applicable):   Lab Results   Component Value Date    COVID19 Not Detected 01/22/2022           Anesthesia Evaluation    Airway: Mallampati: Unable to assess / NA        Dental:          Pulmonary:   (+) current smoker                          ROS comment: Intubated and sedated   Cardiovascular:    (+) hypertension:, CHF: systolic, hyperlipidemia               ROS comment: septic     Neuro/Psych:   (+) neuromuscular disease:, headaches: migraine headaches,             GI/Hepatic/Renal:   (+) renal disease: ARF,          ROS comment: Elevated K+. Endo/Other:    (+) DiabetesType II DM, poorly controlled, using insulin, . Abdominal:             Vascular: Other Findings: Intubated and sedated          Anesthesia Plan      general     ASA 4     (Wife agrees to risks, benefits and alternatives of GETA. Questions answered. Willing to proceed with plan.)  Induction: intravenous.       Anesthetic plan and risks discussed with spouse.                       Estrellita Hernandez MD   2/1/2022

## 2022-02-01 NOTE — PROGRESS NOTES
02/01/22 0832   Vent Information   Vent Type 980   Vent Mode AC/VC+   Vt Ordered 480 mL   Rate Set 22 bmp   Peak Flow 115 L/min   Pressure Support 0 cmH20   FiO2  45 %   SpO2 95 %   SpO2/FiO2 ratio 211.11   Sensitivity 3   PEEP/CPAP 8   I Time/ I Time % 0 s   Humidification Source Heated wire   Humidification Temp 37   Humidification Temp Measured 36.8   Vent Patient Data   High Peep/I Pressure 0   Peak Inspiratory Pressure 34 cmH2O   Mean Airway Pressure 17 cmH20   Rate Measured 35 br/min   Vt Exhaled 984 mL   Minute Volume 18.9 Liters   I:E Ratio 1:4.40   Plateau Pressure 25 EYV37   Cough/Sputum   Sputum How Obtained Suctioned;Endotracheal   Sputum Amount Moderate   Sputum Color Creamy   Tenacity Thick   Spontaneous Breathing Trial (SBT) RT Doc   Pulse 73   Breath Sounds   Right Upper Lobe Diminished   Right Middle Lobe Diminished   Right Lower Lobe Diminished   Left Upper Lobe Diminished   Left Lower Lobe Diminished   Additional Respiratory  Assessments   Resp 26   Position Semi-Springer's   Alarm Settings   High Pressure Alarm 45 cmH2O   Low Minute Volume Alarm 3 L/min   Apnea (secs) 20 secs   High Respiratory Rate 50 br/min   Low Exhaled Vt  300 mL   ETT (adult)   Placement Date: 01/23/22   Preoxygenation: Yes  Technique: Direct laryngoscopy  Type: Cuffed  Tube Size: 7.5 mm  Insertion attempts: 1  Secured at: 24 cm  Measured From: Lips   Secured at 25 cm   Measured From [facility] [address] By [employer] tube wheeler   Site Condition Dry   Cuff Pressure   (MOV)

## 2022-02-01 NOTE — PROGRESS NOTES
Progress Note    HISTORY     CC:  AMS          We are following for acute kidney injury       Subjective/   HPI:    Remains in the ICU. Not waking up. Last HD on 1/31 with 1.8 liters removed, post-weight of 129.3 kg  Renal function remains poor, not making urine or showing signs of renal recovery. ROS:  Constitutional:  + fevers  Respiratory: On vent  :  Anuric     EXAM       Objective/     Vitals:    02/01/22 0754 02/01/22 0800 02/01/22 0832 02/01/22 0900   BP:  (!) 151/82  (!) 159/81   Pulse:  69 73 76   Resp:  19 26 23   Temp: 98.4 °F (36.9 °C)      TempSrc: Bladder      SpO2:  97% 95% 95%   Weight:       Height:         24HR INTAKE/OUTPUT:      Intake/Output Summary (Last 24 hours) at 2/1/2022 1000  Last data filed at 2/1/2022 0805  Gross per 24 hour   Intake 3285.31 ml   Output 3118 ml   Net 167.31 ml     Constitutional:  Critically ill   Eyes:  Pupils reactive, sclera clear   Neck:  Normal thyroid, no masses   Cardiovascular:  Regular, no rub; + LE edema  Respiratory: On vent, no wheezing  Psychiatry:  Sedated on vent   Abdomen: +bs, soft, nt, no masses   Musculoskeletal: No LE edema, no clubbing   Lymphatics:  No LAD in neck, no supraclavicular nodes       MEDICAL DECISION MAKING       Data/  Recent Labs     01/30/22  0415 01/31/22  0420 02/01/22  0400   WBC 26.3* 32.2* 32.0*   HGB 9.0* 8.5* 8.1*   HCT 27.6* 26.1* 24.4*   MCV 93.5 93.0 91.7    201 215     Recent Labs     01/30/22  0415 01/31/22  0420 02/01/22  0515   * 134* 132*   K 4.8 5.0 5.6*   CL 98* 98* 97*   CO2 18* 17* 17*   GLUCOSE 379* 133* 152*   PHOS 6.0* 8.6* 9.2*   MG 2.60*  --   --    BUN 98* 136* 108*   CREATININE 5.1* 6.6* 6.1*   LABGLOM 12* 9* 10*   GFRAA 15* 11* 12*       Assessment/     RODRICK likely related to prerenal factors in the setting of sepsis, use of diuretics and losartan contributing to multifactorial ATN. UA is not suggestive of staph associated glomerulonephritis.   Patient did not respond to IV fluid and developed acute pulmonary edema and renal replacement therapy initiated on 1/23/22   No signs of renal recovery, now on HD MWF schedule     -Acute pulmonary edema              Status post intubation   On vent      -Acute hypoxic respiratory failure     -Cardio respiratory arrest     -Hyperkalemia     -Sepsis with MSSA bacteremia with left foot abscess s/p drainage, osteomyelitis     -Morbid obesity     -Diabetes, uncontrolled    Plan/     Repeat HD today with UF as tolerated with prn albumin, crit line monitoring  Trend labs, bp's, & urine output  Does not appear he will quickly recover so likely to need TDC placed but will need fevers to resolve

## 2022-02-01 NOTE — PROGRESS NOTES
Patient care assumed, assessment completed as charted. Patient continues on ventilator, #7.5 at the 25 lip line. A/C 22, UL 271, FiO2 75%, PEEP 8. Right IJ CVC in place with Precedex  Infusing at 1. 6mcg/kg/hr and Insulin per protocol. right femoral VasCath in place. OG in place, rea catheter patent. Dressings intact to left hand and left foot. No further needs assessed at this time.  Will monitor

## 2022-02-01 NOTE — PROGRESS NOTES
Patients spouse in room visiting patient, obtained operative consent for ulcer debridement of left foot by Dr Quyen Pinto witnessed by myself.

## 2022-02-01 NOTE — FLOWSHEET NOTE
met pt's spouse and spouse's mother in ICU waiting while rounding. Offered conversation and encouragement. Seemed hopeful today for some improvement. Pt is self-employed . Spouse's mom is/was a . Welcomed encouragement. 02/01/22 1726   Encounter Summary   Services provided to: Family  (in ICU waiting)   Referral/Consult From: Rounding   Continue Visiting   (2/1 - Visited pt family in ICU waiting)   Complexity of Encounter Low   Length of Encounter 15 minutes   Routine   Type Initial   Assessment Approachable;Calm; Hopeful   Intervention Nurtured hope;Explored feelings, thoughts, concerns   Outcome Expressed gratitude; Shared life review;Engaged in conversation;Receptive

## 2022-02-01 NOTE — PROGRESS NOTES
01/31/22 1915   Vent Information   Vent Mode AC/VC   Vt Ordered 430 mL   Rate Set 22 bmp   Peak Flow 115 L/min   FiO2  75 %   SpO2 94 %   Sensitivity 3   PEEP/CPAP 8   Vent Patient Data   Peak Inspiratory Pressure 44 cmH2O   Mean Airway Pressure 18 cmH20   Rate Measured 47 br/min   Vt Exhaled 445 mL   Minute Volume 21.5 Liters   I:E Ratio 1:2.10   Plateau Pressure 25 UBZ93   Static Compliance 26 mL/cmH2O   Dynamic Compliance 12 mL/cmH2O   Cough/Sputum   Sputum How Obtained Suctioned;Endotracheal   Cough Productive   Sputum Amount Moderate   Sputum Color Creamy   Tenacity Thick

## 2022-02-01 NOTE — PROGRESS NOTES
Tube feeds turned off for possible surgery today, Dr Sissy Sosa at bedside. Assessment completed, VSS.

## 2022-02-01 NOTE — PROGRESS NOTES
[facility] Infectious Disease Progress Note      Fabian Way     : 1975    DATE OF VISIT:  2022  DATE OF ADMISSION:  2022       Subjective:     Fabian Way is a 42 y.o. male whom I've been seeing for an MSSA foot infection with bacteremia and multiorgan failure. Since I last saw him, he completed a session of HD yesterday. Still fever yesterday afternoon. Had his MRI. Significant liquid stool output (600 mL now) started overnight. A bit more awake on the vent today, looks a bit agitated and uncomfortable. FIO2 had been up to 75% briefly, down to 45% now. Mr. Henny Rust has a past medical history of Acute osteomyelitis of right hallux ([address] Cellulitis of left foot, CHF (congestive heart failure) ([address] Chronic osteomyelitis of left foot ([address] Closed displaced fracture of distal phalanx of right little finger, Clostridium difficile infection, Diabetic ulcer of left forefoot associated with type 2 diabetes mellitus, with necrosis of bone ([address] Diabetic ulcer of right great toe associated with type 2 diabetes mellitus, with necrosis of bone ([address] Failed soft 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 toe amputation site, History of hyperbaric oxygen therapy, Migraine, Possible perforated tympanic membrane, Post-op hematoma of left foot, Recurrent otitis media, Tear of medial meniscus of left knee, Tobacco use, and Toe osteomyelitis, left ([address]     Current Facility-Administered Medications: fentaNYL (SUBLIMAZE) injection 50 mcg, 50 mcg, IntraVENous, Q1H PRN  cefepime (MAXIPIME) 1000 mg IVPB minibag, 1,000 mg, IntraVENous, Q24H  insulin regular (HUMULIN R;NOVOLIN R) 100 Units in sodium chloride 0.9 % 100 mL infusion, 0.5 Units/hr, IntraVENous, Continuous  linezolid (ZYVOX) IVPB 600 mg, 600 mg, IntraVENous, Q12H  Venelex ointment, , Topical, BID  albumin human 25 % IV solution 12.5 g, 12.5 g, IntraVENous, PRN  heparin (porcine) injection 2,600 Units, 2,600 Units, IntraCATHeter, PRN  heparin (porcine) injection 5,000 Units, 5,000 Units, SubCUTAneous, 3 times per day  dexmedetomidine (PRECEDEX) 400 mcg in sodium chloride 0.9 % 100 mL infusion, 0.2-2 mcg/kg/hr, IntraVENous, Continuous  midazolam (VERSED) injection 2 mg, 2 mg, IntraVENous, Q1H PRN  famotidine (PEPCID) injection 20 mg, 20 mg, IntraVENous, Daily  sodium chloride flush 0.9 % injection 5-40 mL, 5-40 mL, IntraVENous, 2 times per day  sodium chloride flush 0.9 % injection 5-40 mL, 5-40 mL, IntraVENous, PRN  0.9 % sodium chloride infusion, 25 mL, IntraVENous, PRN  acetaminophen (TYLENOL) tablet 650 mg, 650 mg, Oral, Q6H PRN **OR** acetaminophen (TYLENOL) suppository 650 mg, 650 mg, Rectal, Q6H PRN  glucose (GLUTOSE) 40 % oral gel 15 g, 15 g, Oral, PRN  glucagon (rDNA) injection 1 mg, 1 mg, IntraMUSCular, PRN  dextrose 5 % solution, 100 mL/hr, IntraVENous, PRN  dextrose bolus (hypoglycemia) 10% 125 mL, 125 mL, IntraVENous, PRN **OR** dextrose bolus (hypoglycemia) 10% 250 mL, 250 mL, IntraVENous, PRN  chlorhexidine (PERIDEX) 0.12 % solution 15 mL, 15 mL, Mouth/Throat, BID     This is day 10 of MSSA therapy, day 3 of cefepime and linezolid. Allergies: Januvia [sitagliptin], Metformin and related, Vancomycin, and Mustard oil [allyl isothiocyanate]    Pertinent items from the review of systems are discussed in the HPI; the remainder of the ROS was reviewed and is negative.      Objective:     Vital signs over the last 24 hours:  Temp  Av.8 °F (37.7 °C)  Min: 98.4 °F (36.9 °C)  Max: 101.4 °F (38.6 °C)  Pulse  Av.8  Min: 66  Max: 624  Systolic (23EPH), ZQR:050 , Min:104 , XVQ:832   Diastolic (17QPG), KKP:76, Min:53, Max:82  Resp  Av.7  Min: 16  Max: 45  SpO2  Av.8 %  Min: 90 %  Max: 100 %    Constitutional:  well-developed, well-nourished, a bit agitated today on the vent  Psychiatric:  Otherwise unable to assess   Eyes:  pupils equal, round, reactive to light; sclerae anicteric, conjunctivae not pale  ENT:  atraumatic; no labial ulcers; orally intubated  Resp:  lungs I think clearer to auscultation BL than last week, decreased at the bases  Cardiovascular:  heart regular, not tachy now, decreased heart sounds, no murmur; generally mild extremity edema, mild-mod LLE, which is still warmer than the right; no IV phlebitis; right IJ CVC in place, and a right femoral CRRT line, a couple of peripherals  GI:  abdomen soft, NT, distended, hypoactive bowel sounds, no palpable masses or organomegaly; rectal tube now in place with brown liquid stool  : Munoz in place, small amount of bloody urine  Musculoskeletal:  no clubbing, cyanosis or petechiae; extremities with no gross effusions or acute arthritis, I think stable Charcot deformity overall, but he probably has (abnormally) more left midfoot ROM than he had last year  Skin: warm, dry, normal turgor; no acute rash, no peripheral stigmata of endocarditis. Left hand not examined today; left foot about as swollen and warm as last week, stable dorsal area of ecchymosis and epidermal lysis, and on manual pressure today more pus than Friday, maybe less than yesterday, now seems to be tracking from deeper in the midfoot, not just along the 3rd-4th rays; definite bony crepitus on deep palpation and ROM; less bleeding again today.   ______________________________    Recent Labs     02/01/22  0400 01/31/22  0420 01/30/22  0415   WBC 32.0* 32.2* 26.3*   HGB 8.1* 8.5* 9.0*   HCT 24.4* 26.1* 27.6*   MCV 91.7 93.0 93.5    201 186     Lab Results   Component Value Date    CREATININE 6.1 (HH) 02/01/2022     Lab Results   Component Value Date    LABALBU 2.1 (L) 02/01/2022     Lab Results   Component Value Date    ALT 6 (L) 02/01/2022    AST 63 (H) 02/01/2022     (H) 01/31/2022    ALKPHOS 437 (H) 02/01/2022    BILITOT 0.6 02/01/2022      Lab Results   Component Value Date    LABA1C 10.6 01/23/2022     Other recent pertinent labs: Anion gap 18. Glucoses more in the 100s now.         CPK down to 676.       GGT 4-5x ULN at 272. LFTs decreasing. ANC 27.2k, 3 bands, 3 atypical lymphs.  ______________________________    Recent pertinent micro results:  Jan 30 BCx (-) x 2, almost 48 hours. Bronch specimens just with NRF. Left hand Gram stain just WBCs, Cx pending.   ______________________________    Recent imaging results (last 7 days):     CT HEAD WO CONTRAST    Result Date: 1/28/2022  1. No acute intracranial abnormality. XR CHEST PORTABLE    Result Date: 2/1/2022  No significant interval change. XR CHEST PORTABLE    Result Date: 1/31/2022  Cardiomegaly with left basilar effusion and bibasilar infiltrates. Stable support tubes. XR CHEST PORTABLE    Result Date: 1/30/2022  1. Stable lines, tubes, and support devices. 2. Bilateral airspace opacities with pleural effusions, left greater than right. 3. Cardiomegaly. XR CHEST PORTABLE    Result Date: 1/29/2022  1. Stable lines, tubes, and support devices. 2. Stable cardiopulmonary status after accounting for differences in patient positioning including bilateral airspace opacities. 3. Cardiomegaly. 4. Low lung volumes. XR CHEST PORTABLE    Result Date: 1/28/2022  CHF with increasing pulmonary edema. XR CHEST PORTABLE    Result Date: 1/27/2022  Patchy airspace disease, left greater than right which may represent atelectasis or pneumonia. XR CHEST PORTABLE    Result Date: 1/26/2022  Stable support apparatus. Increasing bilateral airspace opacities which may be related to edema and/or pneumonia. MRI FOOT LEFT WO CONTRAST    Result Date: 1/31/2022  1. Diffuse subcutaneous edema with organized complex collection along the dorsal soft tissues of the foot which communicates with large midfoot effusion. The dorsal collection measures 2.0 x 4.0 x 4.1 cm. Findings highly suspicious for abscess and septic joint given patient history.  2. Marrow signal changes throughout the midfoot and extending along the 2nd through 5th metatarsals which are most suggestive of osteomyelitis in the setting of soft tissue infection. Underlying Charcot arthropathy also present. Assessment:     Patient Active Problem List   Diagnosis Code    Hypertension I10    Uncontrolled type 2 diabetes mellitus with diabetic polyneuropathy ([facility]) E11.42, E11.65    Cardiomyopathy ([facility] I42.9    Mixed hyperlipidemia E78.2    Hyperglycemia R73.9    Allergic rhinitis J30.9    Osteoarthritis M19.90    Acute renal failure (HCC) N17.9    MSSA bacteremia R78.81, B95.61    Leukocytosis D72.829    Third degree burn of left hand T23.302A    Syncope R55    Acute hypoxemic respiratory failure (HCC) J96.01    Septic shock ([facility]) A41.9, R65.21    Cardiopulmonary arrest ([facility]) I46.9    Abscess of left foot L02.612    Hypertriglyceridemia E78.1    Second degree burn of multiple fingers of left hand excluding thumb T23.232A     Assessment of today's active condition(s):      --          Background of uncontrolled DM2, neuropathy, no known PAD, multiple prior diabetic foot ulcers, infections, surgeries. Most recent wounds were at the left 1st and 2nd ray, but they had been healed for just about a year.      --          Admission this time with a fairly nonfocal sepsis syndrome, at least in terms of symptoms, complicated by shock, acute renal failure, lactic acidosis, then cardiac arrest, resuscitation, acute respiratory failure, rapid Afib. Currently with vent support, sedation, insulin drip, and on intermittent HD. Found to have MSSA bacteremia, and a sizeable MSSA foot abscess.  Ongoing exams and MRI consistent with SQ abscess, septic midfoot arthritis and probably acute osteo of at least a couple of midfoot bones / metatarsal bases.     --          Ongoing hypoxemic respiratory failure, I think at least in part due to CHF / fluid overload after cardiac arrest.     --          Third degree burn of left hand, now with some purulence under that eschar, which likely doesn't explain the fever over the weekend -- more likely from the foot.     --          Multifactorial leukocytosis with impressive left shift, at this point I think from incomplete source control of his infection, when it comes to the left foot. New fevers over the weekend, maybe all from the left foot, but now also with significant liquid diarrhea - that COULD be from tube feeds, but with broad-spectrum Abx, stay in ICU, fever, impressive leukocytosis, need to evaluate for Cdiff. Treatment recs: With negative BCx and bronch samples from Sunday, can stop broad-spectrum Abx. Will change to nafcillin for the MSSA abscess / arthritis / osteo / bacteremia at this point, since that should be less likely to risk Cdiff than Ancef. Check a Cdiff assay. If positive, will start oral (NGT) Vanco.     Await final left hand Cx, but will probably just allow drainage and local care to manage any localized infection there. Needs to go to the OR for at least a more extensive incision, drainage and debridement - timing per Dr. Stoney Agudelo and ICU team.     D/W ICU RN.     Electronically signed by Cristóbal Rodriguez MD on 2/1/2022 at 8:20 AM.

## 2022-02-01 NOTE — BRIEF OP NOTE
Brief Postoperative Note      Patient: Sarah Heck  YOB: 1975  MRN: [id number]    Date of Procedure: 2/1/2022    Pre-Op Diagnosis: DIABETIC FOOT ULCER, OSTEOMYELITIS    Post-Op Diagnosis: Same       Procedure(s):  ULCER DEBRIDEMENT LEFT FOOT    Surgeon(s):  Chas Mosley DPM    Assistant:  Surgical Assistant: Natali Salamanca    Anesthesia: Monitor Anesthesia Care    Estimated Blood Loss (mL): less than 235     Complications: None    Specimens:   ID Type Source Tests Collected by Time Destination   1 : BONE LEFT FOOT  Bone Bone CULTURE, SURGICAL Chas Mosley DPM 2/1/2022 1105        Implants:  * No implants in log *      Drains:   NG/OG/NJ/NE Tube Center mouth (Active)   Surrounding Skin Dry 02/01/22 0805   Securement device Yes 02/01/22 0805   Status [place]imney 02/01/22 0805   Placement Verified by Gastric Contents 02/01/22 0805   NG/OG/NJ/NE External Measurement (cm) 64 cm 02/01/22 0805   Drainage Appearance Bile 02/01/22 0805   Tube Feeding High Protein 02/01/22 0805   Tube Feeding Status Continuous 02/01/22 0805   Rate/Schedule 40 mL/hr 02/01/22 0805   Tube Feeding Supplement (Product) Other (Comment) 01/27/22 2000   Tube Feeding Supplement Amount (mL) 0 01/28/22 2000   Tube Feeding Intake (mL) 84 ml 02/01/22 0805   Free Water Flush (mL) 0 mL 02/01/22 0540   Free Water Rate 30q4h 02/01/22 0805   Residual Volume (ml) 5 ml 02/01/22 0805   Output (mL) 0 ml 02/01/22 0805       Rectal Tube With balloon (Active)   Stool Appearance Watery 02/01/22 0800   Stool Color Brown 02/01/22 0800   Rectal Tube Output 0 ml 02/01/22 0800       Urethral Catheter Temperature probe 16 fr (Active)   Catheter Indications Need for fluid volume management of the critically ill patient in a critical care setting 02/01/22 1034   Site Assessment Pink;Moist 02/01/22 1034   Urine Color Brown 02/01/22 1034   Urine Appearance Cloudy 02/01/22 1034   Output (mL) 10 mL 02/01/22 1034       Findings: infected, necrotic tissue dorsal midfoot. Charcot/osteomyelitis of midfoot.      Electronically signed by Kolton Montgomery DPM on 2/1/2022 at 11:17 AM

## 2022-02-01 NOTE — CARE COORDINATION
INTERDISCIPLINARY PLAN OF CARE CONFERENCE    Date/Time: 2/1/2022 2:14 PM  Completed by: Jerry Mireles RN, Case Management      Patient Name:  Hudson Null  YOB: 1975  Admitting Diagnosis: Hyperglycemia [R73.9]  RODRICK (acute kidney injury) ([address] [N17.9]  Acute renal failure, unspecified acute renal failure type ([address] [N17.9]  Syncope, unspecified syncope type [R55]     Admit Date/Time:  1/22/2022  1:32 PM    Chart reviewed. Interdisciplinary team contacted or reviewed plan related to patient progress and discharge plans. Disciplines included Case Management, Nursing, and Dietitian. Current Status: IP 01/22/2022  PT/OT recommendation for discharge plan of care: Ordered    Expected D/C Disposition:   ICU/VENT, S/P Cardio/Pulmonary arrest/CRRT  OR for foot wound debridement today  Will need long term IVABTX. CM following.

## 2022-02-01 NOTE — PROGRESS NOTES
Pulmonary & Critical Care Medicine ICU Progress Note    CC: Septic shock, MSSA bacteremia, left foot abscess    Events of Last 24 hours:  Fever  Diarrhea   Plans for OR for foot abscess    Vascular lines:    Right IJ 1/23  Right femoral Vas-Cath 1/23    MV: 1/23/22  Vent Mode: AC/VC+ Rate Set: 22 bmp/Vt Ordered: 480 mL/ /FiO2 : (S) 45 %  Recent Labs     01/31/22  0420 02/01/22  0400   PHART 7.365 7.404   [id number] 29.7* 28.3*   PO2ART 64.5* 78.6       IV:   insulin 4.76 Units/hr (02/01/22 0800)    dexmedetomidine HCl in NaCl 1.4 mcg/kg/hr (02/01/22 0800)    sodium chloride 5 mL/hr at 01/31/22 0610    dextrose         Vitals:  Blood pressure (!) 151/82, pulse 73, temperature 98.4 °F (36.9 °C), temperature source Bladder, resp. rate 26, height 6' 1\" (1.854 m), weight 287 lb 14.7 oz (130.6 kg), SpO2 95 %. on vent    Intake/Output Summary (Last 24 hours) at 2/1/2022 0917  Last data filed at 2/1/2022 0805  Gross per 24 hour   Intake 3285.31 ml   Output 3138 ml   Net 147.31 ml     General: intubated, ill appearing    ENT: Pharynx with ETT. Resp: No crackles. No wheezing. CV: S1, S2. + edema  GI: NT, ND, +BS  Skin: Warm and dry. Neuro: PERRL. Grimaces to pain. Sedated, not following commands.  Patellar reflexes are symmetric    Scheduled Meds:   nafcillin  2,000 mg IntraVENous Q4H    Venelex   Topical BID    heparin (porcine)  5,000 Units SubCUTAneous 3 times per day    famotidine (PEPCID) injection  20 mg IntraVENous Daily    sodium chloride flush  5-40 mL IntraVENous 2 times per day    chlorhexidine  15 mL Mouth/Throat BID       Data:  CBC:   Recent Labs     01/30/22  0415 01/31/22  0420 02/01/22  0400   WBC 26.3* 32.2* 32.0*   HGB 9.0* 8.5* 8.1*   HCT 27.6* 26.1* 24.4*   MCV 93.5 93.0 91.7    201 215     BMP:   Recent Labs     01/30/22  0415 01/31/22  0420 02/01/22  0515   * 134* 132*   K 4.8 5.0 5.6*   CL 98* 98* 97*   CO2 18* 17* 17*   PHOS 6.0* 8.6* 9.2*   BUN 98* 136* 108*   CREATININE 5. 1* 6.6* 6.1*     LIVER PROFILE:   Recent Labs     01/30/22  0415 01/31/22  0420 02/01/22  0515   AST 83* 151* 63*   ALT 9* 13 6*   BILIDIR 0.4* 0.4* 0.4*   BILITOT 0.8 0.7 0.6   ALKPHOS 518* 491* 437*       Microbiology:  1/22 [facility] MSSA  1/22 COVID-19 and influenza negative  1/23/22 Blood cx: NGTD  1/23 tracheal aspirate MSSA  1/24 Wound cx: MSSA  1/24 BC MSSA  1/29/22 BAL pending  1/30/2022 blood sent    Echo 1/25/22: EF 35%, global HK, reduced RV function    CXR 1/31/22 left basilar effusion    MRI Left foot 2/1/22   Impression   1. Diffuse subcutaneous edema with organized complex collection along the   dorsal soft tissues of the foot which communicates with large midfoot   effusion.  The dorsal collection measures 2.0 x 4.0 x 4.1 cm.  Findings   highly suspicious for abscess and septic joint given patient history. 2. Marrow signal changes throughout the midfoot and extending along the 2nd   through 5th metatarsals which are most suggestive of osteomyelitis in the   setting of soft tissue infection.  Underlying Charcot arthropathy also   present. ASSESSMENT:  · Acute hypoxemic respiratory failure   · Septic shock    · Anoxic encephalopathy  · MSSA bacteremia  · Hypertriglyceridemia   · L foot abscess drained 1/24/2022, MSSA; MRI with large fluid collection and changes c/w osteomyelitis   · Cardiopulmonary arrest x 2 1/23/2022, required CPR, TTM - rewarmed 8pm 1/25/22  · Paroxysmal AFIB   · Cardiomyopathy EF 35% on Echo 1/24/22  · Acute pulmonary edema/fluid overload  · Acute kidney failure  · DM with hyperglycemia  · Left hand burn  · History of cardiomyopathy - last echo 2014 EF 50%     PLAN:  Mechanical ventilation as per my orders. The ventilator was adjusted by me at the bedside for unstable, life threatening respiratory failure.   · Titrate precedex to off    · PRN sedatives only for ventilator tolerance  · Insulin infusion  · Was on IV Ancef per infectious disease; was changed to cefepime and Zyvox due to persistent fevers  · check Cdiff  · To OR for I&D today  · EEG completed, await reading   · Nephrology following for HD  · TF  · Lantus 55 BID and ISS   · Prophylaxis: SQ heparin, Pepcid  Total critical care time caring for this patient with life threatening, unstable organ failure, including direct patient contact, management of life support systems, review of data including imaging and labs, discussions with other team members and physicians is 35 minutes so far today, excluding procedures.

## 2022-02-01 NOTE — PROGRESS NOTES
PROGRESS NOTE    Admit Date:  1/22/2022    Subjective:  42 y.o. male who is seen for evaluation of diabetic foot ulcer and abscess left foot. Seen in ICU sedated and on ventilator. History obtained from chart.            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    Chronic osteomyelitis of left foot ([address] 10/27/2020    Closed displaced fracture of distal phalanx of right little finger 4/8/2021    Clostridium difficile infection 11/01/2016    Diabetic ulcer of left forefoot associated with type 2 diabetes mellitus, with necrosis of bone ([address] 8/1/2019    Diabetic ulcer of right great toe associated with type 2 diabetes mellitus, with necrosis of bone ([address] 08/2019    Failed soft 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 toe amputation site 10/26/2020    History of hyperbaric oxygen therapy 10/28/2020    DFU- carmichael 3 - compromised flap    Migraine     Possible perforated tympanic membrane     as a result of recurrent otitis    Post-op hematoma of left foot 11/12/2020    Recurrent otitis media     Tear of medial meniscus of left knee     Tobacco use     Toe osteomyelitis, left ([address] 7/24/2020       Past Surgical History:        Procedure Laterality Date    KNEE ARTHROSCOPY Left [id number]    LEFT KNEE ARTHROSCOPY , SYNOVECTOMY       OTHER SURGICAL HISTORY Left 07/24/2020    PARTIAL LEFT FOOT AMPUTATION    TOE AMPUTATION Right 8/23/2019    PARTIAL RIGHT FOOT AMPUTATION performed by Chris Mabry DPM at [facility] Left 7/24/2020    PARTIAL LEFT FOOT AMPUTATION performed by Chris Mabry DPM at [facility] TOE AMPUTATION Left 10/22/2020    PARTIAL LEFT FOOT AMPUTATION WITH GRAFT APPLICATION performed by Chris Mabry DPM at [facility]         Current Medications:     cefepime  1,000 mg IntraVENous Q24H    linezolid  600 mg IntraVENous Q12H    Venelex   Topical BID    heparin (porcine)  5,000 Units SubCUTAneous 3 times per day    famotidine (PEPCID) injection  20 mg IntraVENous Daily    sodium chloride flush  5-40 mL IntraVENous 2 times per day    chlorhexidine  15 mL Mouth/Throat BID       Allergies:  Januvia [sitagliptin], Metformin and related, Vancomycin, and Mustard oil [allyl isothiocyanate]    Social History:    Social History     Tobacco Use    Smoking status: Former Smoker     Packs/day: 0.50     Years: 2.00     Pack years: 1.00     Quit date: 2005     Years since quittin.4    Smokeless tobacco: Former User     Quit date: 2005    Tobacco comment: SMOKED OCCASIONALLY UNTIL 8 YEARS AGO   Vaping Use    Vaping Use: Never used   Substance Use Topics    Alcohol use: Yes     Comment: RARELY    Drug use: No       Family History:       Problem Relation Age of Onset    Diabetes Mother     High Blood Pressure Mother     High Cholesterol Mother     Diabetes Father     High Blood Pressure Father     High Cholesterol Father     Stroke Father     High Blood Pressure Sister     High Blood Pressure Maternal Uncle     High Cholesterol Maternal Uncle     High Blood Pressure Maternal Grandmother        Review of Systems    Not obtained      Objective:   BP (!) 142/69   Pulse 66   Temp 98.4 °F (36.9 °C) (Bladder)   Resp 20   Ht 6' 1\" (1.854 m)   Wt 287 lb 14.7 oz (130.6 kg)   SpO2 97%   BMI 37.99 kg/m²     Data:  CBC:   Recent Labs     22  0400   WBC 26.3* 32.2* 32.0*   HGB 9.0* 8.5* 8.1*   HCT 27.6* 26.1* 24.4*   MCV 93.5 93.0 91.7    201 215     BMP:   Recent Labs     22  0420 22  0515   * 134* 132*   K 4.8 5.0 5.6*   CL 98* 98* 97*   CO2 18* 17* 17*   PHOS 6.0* 8.6* 9.2*   BUN 98* 136* 108*   CREATININE 5.1* 6.6* 6.1*     LIVER PROFILE:   Recent Labs     22 02/01/22  0515   AST 83* 151* 63*   ALT 9* 13 6*   BILIDIR 0.4* 0.4* 0.4*   BILITOT 0.8 0.7 0.6   ALKPHOS 518* 491* 437*     PT/INR:   Recent Labs     01/30/22  0415 01/31/22  0420 02/01/22  0515   PROT 5.6* 5.7* 5.8*     HgBA1c:  Lab Results   Component Value Date    LABA1C 10.6 01/23/2022       Cultures: blood - MSSA    Wound - MSSA    Imaging: xray left foot -   Remote surgical history of amputation at the 1st and 2nd digits. Margins of   the remaining 1st metatarsal are smooth with slight bony remodeling following   prior surgery. Margins of the remaining 2nd metatarsal are also smooth with   heterotopic bone and fusion of fragments at the base of the previously   remaining proximal phalanx. Subtle erosions are seen at the 3rd MTP joint   involving the base of the phalanx and the distal head of the 3rd metatarsal.   There is severe soft tissue swelling at the prior surgical site. Questionable pattern of subcutaneous gas along the lateral aspect of the left   foot adjacent to the shaft of the 5th metatarsal.  There is severe   disorganization of bone at the tarsometatarsal joints of the 2nd through 5th   digits. Impression:  1. Remote history of amputation at the 1st and 2nd digits. No evidence of   osteomyelitis at prior resection site. 2. Subtle erosions at the 3rd MTP joint are new. This is adjacent to soft   tissue swelling at the prior amputation site. A new focus of osteomyelitis   is suspected. 3. Soft tissue swelling and questionable subcutaneous gas along the lateral   aspect of the left foot. There is also severe disorganization of bone at the   2nd through 5th tarsometatarsal joints. Although pattern may represent   Charcot arthropathy due to the more diffuse appearance, areas of   osteomyelitis cannot be excluded with plain film. Correlate with physical   exam and clinical workup. A follow-up MRI may be helpful for further   evaluation. MRI Left foot -   1.  Diffuse subcutaneous edema with organized complex collection along the   dorsal soft tissues of the foot which communicates with large midfoot   effusion. The dorsal collection measures 2.0 x 4.0 x 4.1 cm. Findings   highly suspicious for abscess and septic joint given patient history. 2. Marrow signal changes throughout the midfoot and extending along the 2nd   through 5th metatarsals which are most suggestive of osteomyelitis in the   setting of soft tissue infection. Underlying Charcot arthropathy also   present. Physical Exam:    DP/PT palpable bilateral  Right foot - no open lesions noted. No pressure lesions noted. Left foot - Ulcer on the dorsal midfoot which does have some purulent drainage. Purple discoloration dorsal midfoot consistent with early necrosis of skin. No malodor noted. No crepitus noted. + edema of the foot. Mild erythema periwound. No increase in skin temperature noted. Probes to bone of metatarsal.   Excessive ROM of the midfoot consistent with Charcot foot. Plantarly the foot does not reveal any signs of infection. Prior amputation hallux and 2nd digit  Rocker bottom foot deformity noted left.          Assessment:  Patient Active Problem List   Diagnosis Code    Hypertension I10    Uncontrolled type 2 diabetes mellitus with diabetic polyneuropathy ([facility]) E11.42, E11.65    Cardiomyopathy ([facility] I42.9    Mixed hyperlipidemia E78.2    Hyperglycemia R73.9    Allergic rhinitis J30.9    Osteoarthritis M19.90    Acute renal failure ([facility]) N17.9    MSSA bacteremia R78.81, B95.61    Leukocytosis D72.829    Third degree burn of left hand T23.302A    Syncope R55    Acute hypoxemic respiratory failure ([facility]) J96.01    Septic shock ([facility]) A41.9, R65.21    Cardiopulmonary arrest ([facility]) I46.9    Abscess of left foot L02.612    Hypertriglyceridemia E78.1    Second degree burn of multiple fingers of left hand excluding thumb T23.232A     Abscess left foot  diabetic foot ulcer left secondary to peripheral neuropathy   diabetes mellitus uncontrolled  Bacteremia (MSSA)  Charcot arthropathy left foot      Plan  Patient examined. Reviewed labs and imaging. Antibiotics as per Dr. Camille Pitt. Discussed with Dr. Camille Pitt yesterday. MRI reveals continued fluid collection on dorsal foot. Will need OR debridement to excise infected tissue and drain what is most likely continued abscess. Chance of nonhealing wound, infection, need for further surgery, loss of limb or life. Osteomyelitis of the midfoot is very concerning for viability of the foot. However this is acute osteomyelitis and at least has some chances of salvage. If does not improve with local debridement may require BKA. OK for surgery from Dr. Jose De Jesus Gruber standpoint. Will follow.          Electronically signed by Rachel Murcia DPM on 2/1/2022 at 8:12 AM.

## 2022-02-01 NOTE — ANESTHESIA POSTPROCEDURE EVALUATION
Department of Anesthesiology  Postprocedure Note    Patient: Magui Everett  MRN: [id number]  YOB: 1975  Date of evaluation: 2/1/2022  Time:  5:43 PM     Procedure Summary     Date: 02/01/22 Room / Location: [address] [place] [address] / [facility] [place]    Anesthesia Start: 1041 Anesthesia Stop: 1133    Procedure: ULCER DEBRIDEMENT LEFT FOOT (Left Foot) Diagnosis: (DIABETIC FOOT ULCER, OSTEOMYELITIS)    Surgeons: Rachel Murcia DPM Responsible Provider: Veda Nagel MD    Anesthesia Type: General ASA Status: 4          Anesthesia Type: General    Deysi Phase I:      Deysi Phase II:      Last vitals: Reviewed and per EMR flowsheets. Anesthesia Post Evaluation    Patient location during evaluation: PACU  Patient participation: complete - patient cannot participate  Level of consciousness: sedated and ventilated  Airway patency: patent  Nausea & Vomiting: no nausea and no vomiting  Complications: no  Cardiovascular status: blood pressure returned to baseline  Respiratory status: acceptable  Hydration status: euvolemic  Comments: VSS on transfer to phase 2 recovery. No anesthetic complications.

## 2022-02-01 NOTE — CONSULTS
Attempted to see. Spoke with staff RN. Staff RN just turned patient and sacrum assessed. Per staff RN, purple tissue present and first documented on 1/29. No photo obtained. Family now in room. Will assess patient tomorrow.  PIP in place, staff RN and turning and repositioning patient every 3 hours and prn

## 2022-02-01 NOTE — PROGRESS NOTES
Patient report given to Stas Ortiz. Patient has rested on the ventilator overnight without acute changes in assessment noted. Care transferred.

## 2022-02-01 NOTE — PROGRESS NOTES
Hospitalist Progress Note      PCP: Pat Gaffney MD    Date of Admission: 1/22/2022     Admitted with acute hypoxic respiratory failure, septic shock and MSSA bacteremia  Left foot abscess drained 1/24/2022  Cardiopulmonary arrest x2 on 1/23/2022, required CPR  Went with acute kidney injury currently on CRRT    Subjective: on CRRT, still on levophed  Off epinephrine  On insulin drip    1/27  Failed SBT    CRRT stopped-Lasix 100 mg IV x1 given  Off levophed     1/28  Not making urine despite receiving Lasix 100 mg IV  Increased tachypnea and hypoxia off sedation  On Precedex    1/29  Currently on hemodialysis  On Precedex only. Off all the sedation. Doing well on SBT, however not waking up. Fever     1/30  Currently only on Precedex. Not following any commands or having any purposeful movements. T-max 101.2    1/31- Spiked fevers over the weekend. On the ventilator. Not following commands. Mom at bedside. She said he has opened his eyes. 2/1- MRI of the left foot was done. It showed an abscess/septic joint and osteomyelitis. On Precedex. He went to the OR today. Ulcer debridement of left foot was done by Dr Iliana Casiano.        Medications:  Reviewed    Infusion Medications    insulin 3.38 Units/hr (02/01/22 1248)    dexmedetomidine HCl in NaCl 2 mcg/kg/hr (02/01/22 1240)    sodium chloride 5 mL/hr at 02/01/22 1041    dextrose       Scheduled Medications    nafcillin  2,000 mg IntraVENous Q4H    Venelex   Topical BID    heparin (porcine)  5,000 Units SubCUTAneous 3 times per day    famotidine (PEPCID) injection  20 mg IntraVENous Daily    sodium chloride flush  5-40 mL IntraVENous 2 times per day    chlorhexidine  15 mL Mouth/Throat BID     PRN Meds: fentanNYL, albumin human, heparin (porcine), midazolam, sodium chloride flush, sodium chloride, acetaminophen **OR** acetaminophen, glucose, glucagon (rDNA), dextrose, dextrose bolus (hypoglycemia) **OR** dextrose bolus (hypoglycemia)      Intake/Output Summary (Last 24 hours) at 2/1/2022 1428  Last data filed at 2/1/2022 1424  Gross per 24 hour   Intake 2770.31 ml   Output 706 ml   Net 2064.31 ml       Physical Exam Performed:    /65   Pulse 67   Temp 99.1 °F (37.3 °C) (Bladder)   Resp 27   Ht 6' 1\" (1.854 m)   Wt 287 lb 14.7 oz (130.6 kg)   SpO2 97%   BMI 37.99 kg/m²       Gen: intubated. Ill appearing. Eyes: PERRL. No sclera icterus. No conjunctival injection. ENT: No discharge. Pharynx clear. Neck: Trachea midline. Normal thyroid. Resp:+ accessory muscle use. + crackles. No wheezes. No rhonchi. CV: INCREASED rate. Regular rhythm. No murmur or rub. No edema. GI: Non-tender. Non-distended. No masses. No organomegaly. Normal bowel sounds. No hernia. Skin: burns left hand with some yellow slough. Chronic skin changes both legs.   Lymph: No cervical LAD. No supraclavicular LAD. M/S: No cyanosis. No joint deformity. No clubbing. Missing right big toe, left foot is covered. Neuro: Intubated, on Precedex. Does not follow verbal command    Labs:   Recent Labs     01/30/22 0415 01/31/22  0420 02/01/22  0400   WBC 26.3* 32.2* 32.0*   HGB 9.0* 8.5* 8.1*   HCT 27.6* 26.1* 24.4*    201 215     Recent Labs     01/30/22 0415 01/31/22  0420 02/01/22  0515   * 134* 132*   K 4.8 5.0 5.6*   CL 98* 98* 97*   CO2 18* 17* 17*   BUN 98* 136* 108*   CREATININE 5.1* 6.6* 6.1*   CALCIUM 7.8* 8.0* 8.2*   PHOS 6.0* 8.6* 9.2*     Recent Labs     01/30/22 0415 01/31/22  0420 02/01/22  0515   AST 83* 151* 63*   ALT 9* 13 6*   BILIDIR 0.4* 0.4* 0.4*   BILITOT 0.8 0.7 0.6   ALKPHOS 518* 491* 437*     No results for input(s): INR in the last 72 hours. Recent Labs     01/31/22  0420   CKTOTAL 676*   Results for Mancel Given (MRN [id number]) as of 1/31/2022 23:27   Ref. Range 1/31/2022 04:20   GGT Latest Ref Range: 8 - 61 U/L 272 (H)   Results for Mancel Given (MRN [id number]) as of 1/31/2022 23:27   Ref. Range 1/31/2022 04:20   Total CK Latest Ref Range: 39 - 308 U/L 676 (H)       Urinalysis:      Lab Results   Component Value Date    NITRU Negative 01/22/2022    WBCUA 0-2 01/22/2022    BACTERIA Rare 01/22/2022    RBCUA 0-2 01/22/2022    BLOODU TRACE-LYSED 01/22/2022    SPECGRAV >=1.030 01/22/2022    GLUCOSEU 500 01/22/2022       Radiology:  XR CHEST PORTABLE   Final Result   No significant interval change. MRI FOOT LEFT WO CONTRAST   Final Result   1. Diffuse subcutaneous edema with organized complex collection along the   dorsal soft tissues of the foot which communicates with large midfoot   effusion. The dorsal collection measures 2.0 x 4.0 x 4.1 cm. Findings   highly suspicious for abscess and septic joint given patient history. 2. Marrow signal changes throughout the midfoot and extending along the 2nd   through 5th metatarsals which are most suggestive of osteomyelitis in the   setting of soft tissue infection. Underlying Charcot arthropathy also   present. XR CHEST PORTABLE   Final Result   Cardiomegaly with left basilar effusion and bibasilar infiltrates. Stable support tubes. XR CHEST PORTABLE   Final Result   1. Stable lines, tubes, and support devices. 2. Bilateral airspace opacities with pleural effusions, left greater than   right. 3. Cardiomegaly. XR CHEST PORTABLE   Final Result   1. Stable lines, tubes, and support devices. 2. Stable cardiopulmonary status after accounting for differences in patient   positioning including bilateral airspace opacities. 3. Cardiomegaly. 4. Low lung volumes. CT HEAD WO CONTRAST   Final Result   1. No acute intracranial abnormality. XR CHEST PORTABLE   Final Result   CHF with increasing pulmonary edema. XR CHEST PORTABLE   Final Result   Patchy airspace disease, left greater than right which may represent   atelectasis or pneumonia.          XR CHEST PORTABLE   Final Result   Stable support apparatus. Increasing bilateral airspace opacities which may be related to edema and/or   pneumonia. XR CHEST PORTABLE   Final Result   Low lung volumes/poor inspiratory effort limiting the study. No significant improvement. A mild cardiomegaly. Mild congestion and/or   infiltrates identified in the lungs. No pneumothorax. XR FOOT LEFT (2 VIEWS)   Final Result   1. Remote history of amputation at the 1st and 2nd digits. No evidence of   osteomyelitis at prior resection site. 2. Subtle erosions at the 3rd MTP joint are new. This is adjacent to soft   tissue swelling at the prior amputation site. A new focus of osteomyelitis   is suspected. 3. Soft tissue swelling and questionable subcutaneous gas along the lateral   aspect of the left foot. There is also severe disorganization of bone at the   2nd through 5th tarsometatarsal joints. Although pattern may represent   Charcot arthropathy due to the more diffuse appearance, areas of   osteomyelitis cannot be excluded with plain film. Correlate with physical   exam and clinical workup. A follow-up MRI may be helpful for further   evaluation. XR CHEST PORTABLE   Final Result   Low lung volumes with cardiomegaly and vascular congestion, as well as patchy   airspace disease bilaterally, similar to the previous exam.         XR CHEST PORTABLE   Final Result   Status post advancement of right internal jugular central line with distal   tip now visualized near region of junction of superior vena cava and right   atrium. No evidence of pneumothorax. XR CHEST PORTABLE   Final Result   Improved lung volumes. Bilateral perihilar opacification, edema versus   infiltrate. Satisfactory position of endotracheal tube. Central line tip in either the   distal brachiocephalic vein or proximal SVC. XR CHEST PORTABLE   Final Result   Cardiomegaly with left mid and lower lung infiltrates.       Support tubes as described above.         XR CHEST 1 VIEW   Final Result   Limited chest as outlined above. Bilateral perihilar opacification, edema   versus infiltrate. XR CHEST PORTABLE   Final Result   Low lung volumes. No acute cardiopulmonary disease. XR CHEST PORTABLE    (Results Pending)     EEG 1/31/22  Impression: This EEG is abnormal. The generalized diffuse slowing is suggestive of severe diffuse encephalopathy. There is no evidence of epileptiform discharges, focal, or lateralizing abnormalities. Assessment/Plan:    Principal Problem (Resolved):    Septic shock (HCC)  Active Problems:    Uncontrolled type 2 diabetes mellitus with diabetic polyneuropathy (HCC)    Cardiomyopathy ([address]    Mixed hyperlipidemia    Acute osteomyelitis of left foot (HCC)    Acute renal failure (HCC)    MSSA bacteremia    Leukocytosis    Third degree burn of left hand    Acute hypoxemic respiratory failure (HCC)    Cardiopulmonary arrest (HCC)    Abscess of left foot    Hypertriglyceridemia    Staphylococcal arthritis of left foot (HCC)    Antibiotic-associated diarrhea    Acute encephalopathy  Resolved Problems:    Hyperglycemia    Acute respiratory failure with hypoxia (HCC)    Syncope    Acute kidney injury ([address]    Uremia         #MSSA bacteremia. MSSA foot abscess. Septic shock. started Ancef.  ID consultation. Laura Schuster reviewed. Currently on Levophed. Epinephrine DC'd. Continue Solu-Cortef. now off levo  Antibiotics changed to IV cefepime and Zyvox 1/30 given persistent fevers. Culture sent. MRI of the left foot done. It showed a fluid collection likely an abscess/septic joint and osteomyelitis. Ulcer debridement done. ID wants to stop broad spectrum antibiotics. Will start Nafcillin. #RODRICK. Milderd Alexei admitted to hospital with RODRICK.  Normal renal function October 2021. Milderd Alexei is suspected to be prerenal/ATN.   Creatinine worsened.  Nephrology consulted. Iliana Oates was started on IV fluids  Emergent Vas-Cath placed and CRRT started.  Critical care consulted  CRRT stopped 1/27--> Lasix 100 mg IV x1 given--no response. On hemodialysis.        #Acute respiratory failure. Suspect patient developed acute pulmonary edema causing acute respiratory failure from renal failure. Intubated 1/23/22.  Pulmonary consulted. Not having purposeful movements or following commands. obtain head CT-negative for acute findings. EEG ordered. Bronc with BAL and cultures 1/29. Not waking up. EEG negative for seizures.     #H/o cardiomyopathy - check echo with EF 35%, cardio consulted     #S/p PEA arrest 1/23/22     # Left foot abscess - s/p I&D, ID and podiatry consulted, cx sent-Staph aureus. I and D done. Will likely need to go back to the OR for more surgery.      A. fib with controlled ventricular rate- in NSR  On heparin drip     #Diabetes mellitus type 2 uncontrolled.  Insulin.  Continue sliding scale. Lantus 55 units twice daily. monitor sugars. Started on insulin drip 1/30     #Hypertension. BP was soft but now improved.        Heparin gtt for DVT prophylaxis.       Diet: Diet NPO  ADULT TUBE FEEDING; Orogastric; Renal Formula; Continuous; 20; Yes; 20; Q 4 hours; 40; 30; Q 4 hours; Protein; one proteinex P2Go TWICE daily via feeding tube  Code Status: Full Code    PT/OT Eval Status: ordered    Dispo - cont care    Kirk Steven MD 2/1/2022 2:28 PM

## 2022-02-02 NOTE — PROGRESS NOTES
Shift assessment was completed (see flow sheet). Pt is alert- unable to follow commands- tracks with eyes, grimacing to pain. Shakes head, not purposeful. PRN Fentanyl for agitation (See MAR). Pt currently on ventilator- 7.5 ETT, at [address] [place] [address]. FiO2 at 45, PEEP 5, SpO2 at 91%, Respirations are tachypnic,  with diminished sounds. RR into the 40s. Infusing:  Precedex (see MAR). Oral Gastric tube with tube feed at 40, 0mL residual observed and returned. Stopped during HD. IV access- PICC, Peripheral and Pigtail on Vascath and WDL. Munoz in place with STAT lock, Draining Brown/ cloudy urine. Scheduled medications to follow. Call light within reach. Bed in lowest position. Bed alarm on. PRN tyelnol given for Temp 101 (see MAR). Bilateral Wrist restraints opn patient is not moving arms purposeful, for safety of lines and tubes. Family to be updated. Will continue to monitor        Patient is not able to demonstrated the ability to move from a reclining position to an upright position within the recliner. Patient is confused, demented and /or unable to follow instruction. 08:23 AM  PRN versed for RR 40s (See MAR).

## 2022-02-02 NOTE — PROGRESS NOTES
PROGRESS NOTE    Admit Date:  1/22/2022    Subjective:  42 y.o. male who is seen for evaluation of diabetic foot ulcer and abscess left foot. Seen in ICU sedated and on ventilator. History obtained from chart. Patient does have eyes open and looking around today. S/P ulcer debridement 2/1/22.            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    Chronic osteomyelitis of left foot ([address] 10/27/2020    Closed displaced fracture of distal phalanx of right little finger 4/8/2021    Clostridium difficile infection 11/01/2016    Diabetic ulcer of left forefoot associated with type 2 diabetes mellitus, with necrosis of bone ([address] 8/1/2019    Diabetic ulcer of right great toe associated with type 2 diabetes mellitus, with necrosis of bone ([address] 08/2019    Failed soft 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 toe amputation site 10/26/2020    History of hyperbaric oxygen therapy 10/28/2020    [facility]- carmichael 3 - compromised flap    Migraine     Possible perforated tympanic membrane     as a result of recurrent otitis    Post-op hematoma of left foot 11/12/2020    Recurrent otitis media     Septic shock (HCC)     Syncope     Tear of medial meniscus of left knee     Tobacco use     Toe osteomyelitis, left ([address] 7/24/2020       Past Surgical History:        Procedure Laterality Date    KNEE ARTHROSCOPY Left [id number]    LEFT KNEE ARTHROSCOPY , SYNOVECTOMY       OTHER SURGICAL HISTORY Left 07/24/2020    PARTIAL LEFT FOOT AMPUTATION    TOE AMPUTATION Right 8/23/2019    PARTIAL RIGHT FOOT AMPUTATION performed by Charley Tay DPM at [address] [facility] [address] Left 7/24/2020    PARTIAL LEFT FOOT AMPUTATION performed by Charley Tay DPM at [address] TOE AMPUTATION Left 10/22/2020    PARTIAL LEFT FOOT AMPUTATION WITH GRAFT APPLICATION performed by Charley Tay DPM at [address] [facility] EXTRACTION         Current Medications:     nafcillin  2,000 mg IntraVENous Q4H    Venelex   Topical BID    heparin (porcine)  5,000 Units SubCUTAneous 3 times per day    famotidine (PEPCID) injection  20 mg IntraVENous Daily    sodium chloride flush  5-40 mL IntraVENous 2 times per day    chlorhexidine  15 mL Mouth/Throat BID       Allergies:  Januvia [sitagliptin], Metformin and related, Vancomycin, and Mustard oil [allyl isothiocyanate]    Social History:    Social History     Tobacco Use    Smoking status: Former Smoker     Packs/day: 0.50     Years: 2.00     Pack years: 1.00     Quit date: 2005     Years since quittin.4    Smokeless tobacco: Former User     Quit date: 2005    Tobacco comment: SMOKED OCCASIONALLY UNTIL 8 YEARS AGO   Vaping Use    Vaping Use: Never used   Substance Use Topics    Alcohol use: Yes     Comment: RARELY    Drug use: No       Family History:       Problem Relation Age of Onset    Diabetes Mother     High Blood Pressure Mother     High Cholesterol Mother     Diabetes Father     High Blood Pressure Father     High Cholesterol Father     Stroke Father     High Blood Pressure Sister     High Blood Pressure Maternal Uncle     High Cholesterol Maternal Uncle     High Blood Pressure Maternal Grandmother        Review of Systems    Not obtained      Objective:   /63   Pulse 84   Temp 101.1 °F (38.4 °C)   Resp (!) 32   Ht 6' 1\" (1.854 m)   Wt 282 lb 6.6 oz (128.1 kg)   SpO2 95%   BMI 37.26 kg/m²     Data:  CBC:   Recent Labs     22  0420 22  0400 22  0500   WBC 32.2* 32.0* 21.6*   HGB 8.5* 8.1* 7.5*   HCT 26.1* 24.4* 22.7*   MCV 93.0 91.7 92.0    215 234     BMP:   Recent Labs     22  0420 22  0515 22  0500   * 132* 131*   K 5.0 5.6* 5.0   CL 98* 97* 97*   CO2 17* 17* 16*   PHOS 8.6* 9.2* 8.2*   * 108* 91*   CREATININE 6.6* 6.1* 5.5* further   evaluation. MRI Left foot -   1. Diffuse subcutaneous edema with organized complex collection along the   dorsal soft tissues of the foot which communicates with large midfoot   effusion. The dorsal collection measures 2.0 x 4.0 x 4.1 cm. Findings   highly suspicious for abscess and septic joint given patient history. 2. Marrow signal changes throughout the midfoot and extending along the 2nd   through 5th metatarsals which are most suggestive of osteomyelitis in the   setting of soft tissue infection. Underlying Charcot arthropathy also   present. Physical Exam:    DP/PT palpable bilateral  Right foot - no open lesions noted. No pressure lesions noted. Left foot - Ulcer on the dorsal midfoot with obviously exposed bones of the midfoot. Exposed extensor tendons. + bleeding from tissues noted. Mild periwound erythema and edema noted. No malodor noted. No purulence noted. Excessive ROM of the midfoot consistent with Charcot foot. Plantarly the foot does not reveal any signs of infection. Prior amputation hallux and 2nd digit  Rocker bottom foot deformity noted left.          Assessment:  Patient Active Problem List   Diagnosis Code    Hypertension I10    Uncontrolled type 2 diabetes mellitus with diabetic polyneuropathy ([facility]) E11.42, E11.65    Cardiomyopathy ([facility] I42.9    Mixed hyperlipidemia E78.2    Allergic rhinitis J30.9    Osteoarthritis M19.90    Acute osteomyelitis of left foot ([facility] M86.172    Acute renal failure ([facility]) N17.9    MSSA bacteremia R78.81, B95.61    Leukocytosis D72.829    Third degree burn of left hand T23.302A    Acute hypoxemic respiratory failure ([facility]) J96.01    Cardiopulmonary arrest ([facility]) I46.9    Abscess of left foot L02.612    Hypertriglyceridemia E78.1    Staphylococcal arthritis of left foot ([facility]) M00.072    Antibiotic-associated diarrhea K52.1, T36.95XA    Acute encephalopathy G93.40    Second degree burn of multiple fingers of left hand excluding thumb T23.232A     Abscess left foot  diabetic foot ulcer left secondary to peripheral neuropathy   diabetes mellitus uncontrolled  Bacteremia (MSSA)  Charcot arthropathy left foot      Plan  Patient examined. Reviewed labs and imaging. Antibiotics as per Dr. Ryan Araujo. Seen in conjunction with Dr. Ryan Araujo. Dressing removed. Applied iodoform and dry dressing. Will order KCI Veraflo to be applied to continue to cleanse wound and stimulate granulation tissue. Will follow.          Electronically signed by Nubia Stark DPM on 2/2/2022 at 7:35 AM.

## 2022-02-02 NOTE — PLAN OF CARE
Nutrition Problem #1: Inadequate oral intake  Intervention: Food and/or Nutrient Delivery: Continue NPO,Continue Current Tube Feeding  Nutritional Goals: patient will tolerate Nepro at goal rate of 40 ml/hr x 20 hours without GI distress, without s/s of aspiration, and without additional lab/fluid disturbances

## 2022-02-02 NOTE — PROGRESS NOTES
SBT initiated at this time.         02/02/22 0842   Vent Information   Vent Type 980   Vent Mode PS   Pressure Support 5 cmH20   FiO2  45 %   SpO2 93 %   SpO2/FiO2 ratio 206.67   Sensitivity 3   PEEP/CPAP 5   Humidification Source Heated wire   Vent Patient Data   Peak Inspiratory Pressure 13 cmH2O   Mean Airway Pressure 8.5 cmH20   Rate Measured 37 br/min   Vt Exhaled 529 mL   Minute Volume 22.3 Liters   I:E Ratio 1:1.7   Spontaneous Breathing Trial (SBT) RT Doc   Pulse 98   Additional Respiratory  Assessments   Resp (!) 41

## 2022-02-02 NOTE — FLOWSHEET NOTE
Treatment time: 3 hours  Net UF: 2500 ml     Pre weight: 130.6 kg   Post weight: 128.1 kg  EDW: TBD kg     Access used: R fem NTDC  Access function: Positional with -350 ml/min     Medications or blood products given: Heparin for catheter dwells, 25% albumin x 1     Regular outpatient schedule: MWF     Summary of response to treatment:      02/01/22 2059   Vital Signs   /70   Temp 98.9 °F (37.2 °C)   Pulse 69   Resp 27   SpO2 97 %   Weight 282 lb 6.6 oz (128.1 kg)   Percent Weight Change -1.91   Pain Assessment   Pain Assessment Faces   Pain Level 0   Post-Hemodialysis Assessment   Post-Treatment Procedures Blood returned;Catheter capped, clamped and heparinized x 2 ports   Machine Disinfection Process Acid/Vinegar Clean;Heat Disinfect; Exterior Machine Disinfection   Rinseback Volume (ml) 400 ml   Total Liters Processed (l/min) 53.7 l/min   Dialyzer Clearance Moderately streaked   Duration of Treatment (minutes) 180 minutes   Heparin amount administered during treatment (units) 0 units   Hemodialysis Intake (ml) 400 ml   Hemodialysis Output (ml) 2900 ml   NET Removed (ml) 2500 ml   Tolerated Treatment Fair   Copy of dialysis treatment record placed in chart, to be scanned into EMR.  Report provided to Savage Sosa RN at bedside.

## 2022-02-02 NOTE — PROGRESS NOTES
02/01/22 2354   Vent Information   Vent Type 980   Vent Mode AC/VC   Vt Ordered 480 mL   Rate Set 22 bmp   Peak Flow 70 L/min   FiO2  45 %   SpO2 92 %   SpO2/FiO2 ratio 204.44   Sensitivity 3   PEEP/CPAP 5   Humidification Source Heated wire   Humidification Temp 37   Humidification Temp Measured 37.3   Circuit Condensation Drained   Vent Patient Data   Peak Inspiratory Pressure 23 cmH2O   Mean Airway Pressure 11 cmH20   Rate Measured 32 br/min   Vt Exhaled 521 mL   Minute Volume 16 Liters   I:E Ratio 1:2.3   Cough/Sputum   Sputum How Obtained Suctioned;Endotracheal   Sputum Amount Moderate   Sputum Color Creamy   Tenacity Thick   Spontaneous Breathing Trial (SBT) RT Doc   Pulse 86   Breath Sounds   Right Upper Lobe Rhonchi   Right Middle Lobe Diminished   Right Lower Lobe Diminished   Left Upper Lobe Rhonchi   Left Lower Lobe Diminished   Additional Respiratory  Assessments   Resp (!) 39   Position Semi-Springer's   ETT (adult)   Placement Date: 01/23/22   Preoxygenation: Yes  Technique: Direct laryngoscopy  Type: Cuffed  Tube Size: 7.5 mm  Insertion attempts: 1  Secured at: 24 cm  Measured From: Lips   ET Placement Right

## 2022-02-02 NOTE — PROGRESS NOTES
Comprehensive Nutrition Assessment    Type and Reason for Visit:  Reassess    Nutrition Recommendations/Plan:   1. Continue TF order - ADULT TUBE FEEDING; Orogastric; Renal formula - Nepro with a goal rate of 40 ml/hr x 20 hours + 30 ml water flushes every 4 hours for tube patency + one proteinex P2Go TWICE daily via feeding tube. 2. Monitor TF rate, intake, and tolerance + water flushes + administration of one proteinex P2Go TWICE daily via feeding tube. 3. Monitor vent status, HD status + fluid status, and plan of care. 4. Monitor nutrition-related labs, bowel function + rectal tube output, and weight trends. Nutrition Assessment:  patient is improved from a nutritional standpoint AEB tolerating Nepro at goal rate of 40 ml/hr x 20 hours without issues, however, he remains at risk for further compromise d/t altered nutrition-related labs, ongoing respiratory dysfunction, and nutrition losses via HD; will continue Nepro with a goal rate of 40 ml/hr x 20 hours + 30 ml water flushes every 4 hours + one proteinex P2Go TWICE daily via feeding tube    Malnutrition Assessment:  Malnutrition Status: At risk for malnutrition    Context:  Acute Illness     Findings of the 6 clinical characteristics of malnutrition:  Energy Intake:  No significant decrease in energy intake (patient has received TF at goal rate x 5+ days of admission more recently)  Weight Loss:  No significant weight loss     Body Fat Loss:  Unable to assess (COVID-19 +)     Muscle Mass Loss:  Unable to assess (COVID-19 +)    Fluid Accumulation:  No significant fluid accumulation Extremities (BLE + 1 pitting edema)   Strength:  Not Performed    Estimated Daily Nutrient Needs:  Energy (kcal):  1441 - 1834 kcals based on 11-14 kcals/kg/CBW; Weight Used for Energy Requirements:  Current     Protein (g):  168 - 185 g protein based on 2.0-2.2 g/kg/IBW;  Weight Used for Protein Requirements:  Ideal        Fluid (ml/day):  1441 - 1834 ml; Method 1/24/22; actual weight)     · Ideal Body Weight: 184 lbs; % Ideal Body Weight 159.1 %   · BMI: 38.6   · BMI Categories: Obese Class 2 (BMI 35.0 -39.9)       Nutrition Diagnosis:   · Inadequate oral intake related to inadequate protein-energy intake,impaired respiratory function,increase demand for energy/nutrients,renal dysfunction,endocrine dysfuntion as evidenced by NPO or clear liquid status due to medical condition,intubation,nutrition support - enteral nutrition,poor intake prior to admission,lab values,wounds      Nutrition Interventions:   Food and/or Nutrient Delivery:  Continue NPO,Continue Current Tube Feeding  Nutrition Education/Counseling:  No recommendation at this time   Coordination of Nutrition Care:  Continue to monitor while inpatient,Interdisciplinary Rounds    Goals:  patient will tolerate Nepro at goal rate of 40 ml/hr x 20 hours without GI distress, without s/s of aspiration, and without additional lab/fluid disturbances       Nutrition Monitoring and Evaluation:   Behavioral-Environmental Outcomes:  None Identified   Food/Nutrient Intake Outcomes:  Enteral Nutrition Intake/Tolerance,IVF Intake  Physical Signs/Symptoms Outcomes:  Biochemical Data,Diarrhea,GI Status,Fluid Status or Edema,Hemodynamic Status,Weight,Skin     Discharge Planning:     Too soon to determine     Electronically signed by Lonnie Dukes RD, LD on 2/2/22 at 12:34 PM EST    Contact: [phone]

## 2022-02-02 NOTE — CARE COORDINATION
INTERDISCIPLINARY PLAN OF CARE CONFERENCE    Date/Time: 2/2/2022 3:00 PM  Completed by: Tory Ramos RN, Case Management      Patient Name:  Paul Mejia  YOB: 1975  Admitting Diagnosis: Hyperglycemia [R73.9]  RODRICK (acute kidney injury) ([facility] [N17.9]  Acute renal failure, unspecified acute renal failure type ([facility] [N17.9]  Syncope, unspecified syncope type [R55]     Admit Date/Time:  1/22/2022  1:32 PM    Chart reviewed. Interdisciplinary team contacted or reviewed plan related to patient progress and discharge plans. Disciplines included Case Management, Nursing, and Dietitian. Current Status: IP 01/22/2022    ICU/vent. No renal recovery, very low UOP. Per nephrology pt will likely need TDC and intermittent HD. Currently on MWF rotation. Lives in [facility]. Referral made to [facility] and per Elaine Mccall (admissions) [facility] will follow and arrange OP HD.

## 2022-02-02 NOTE — PROGRESS NOTES
Reassessment completed (see Flowsheet). All ICU lines remain intact, ICU monitoring continued-   Infusing:  Precedex (See MAR). pt HD completed. 2L off. Lung sounds Diminished  pt's blood pressures WDL  Spouse at bedside and updated. Pt opens eyes to voice, able to indicate pain when asked yes/no. Wound care RN to bedside. Wound Vac applied to L foot. Dressing changed to L hand. Sacrum/ Coccyx visualized. (see NOTE). Continuing to monitor.

## 2022-02-02 NOTE — PROGRESS NOTES
4 Eyes Skin Assessment     The patient is being assess for   Transfer to New Unit    I agree that 2 RN's have performed a thorough Head to Toe Skin Assessment on the patient. ALL assessment sites listed below have been assessed. Areas assessed for pressure by both nurses:   [x]   Head, Face, and Ears   [x]   Shoulders, Back, and Chest, Abdomen  [x]   Arms, Elbows, and Hands   [x]   Coccyx, Sacrum, and Ischium  [x]   Legs, Feet, and Heels        Skin Assessed Under all Medical Devices by both nurses:  ETT, rea and OG            Non-blanchable redness to sacrum. Bilateral heels intact. Pre-existing wound to left hand and left foot. All Mepilex Borders were peeled back and area peeked at by both nurses:  Yes  Please list where Mepilex Borders are located:               **SHARE this note so that the co-signing nurse is able to place an eSignature**    Co-signer eSignature: Electronically signed by Va Christianson RN on 1/23/22 at 6:14 PM EST    Does the Patient have Skin Breakdown related to pressure?   Yes LDA WOUND CARE was Initiated documentation include the Shanita-wound, Wound Assessment, Measurements, Dressing Treatment, Drainage, and Color\",     (Insert Photo here)         Shimon Prevention initiated:  Yes  Wound Care Orders initiated:  Yes      [id number]  nurse consulted for Pressure Injury (Stage 3,4, Unstageable, DTI, NWPT, Complex wounds)and New or Established Ostomies:  No      Primary Nurse eSignature: Electronically signed by Cayla Estrada RN on 1/23/22 at 6:06 PM EST

## 2022-02-02 NOTE — PROGRESS NOTES
Reassessment completed (see Flowsheet). All ICU lines remain intact, ICU monitoring continued-   Infusing:  Precedex (see MAR)    Requiring frequent PRNs- Pt's RR into 40s, shakes head and becomes agitated. pt remains Peep 5, FiO2 50%. Lung sounds Diminished  pt's blood pressures WDL. Continuing to monitor.

## 2022-02-02 NOTE — CONSULTS
[facility] Nurse  Consult Note       NAME:  General Gama  MEDICAL RECORD NUMBER:  [id number]  AGE: 42 y.o. GENDER: male  : 1975  TODAY'S DATE:  2022    Subjective Pt intubated, eyes open, nodding head to yes/no questions   Reason for WOCN Evaluation and Assessment: Sacrum, left hand, right ear      General Gama is a 42 y.o. male referred by:   [x] Physician  [x] Nursing  [] Other:     Wound Identification:  Wound Type: pressure and burn  Contributing Factors: diabetes, chronic pressure, decreased mobility, shear force, obesity and non-adherence    Pt seen for wound care to the sacrum, left hand and right ear. Discussed with sister at bedside. Pt was ill at home prior to admission, was lying around for several days without getting up due to infection in his foot. Buttocks and sacrum were red and nonblanchable on admission to the hospital.  Hand discussed with Dr Ryan Araujo, he will see in AM.  Orders for Triad paste for now, will await Dr Ryan Araujo to re eval in the AM for next steps.       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    Chronic osteomyelitis of left foot ([address] 10/27/2020    Closed displaced fracture of distal phalanx of right little finger 2021    Clostridium difficile infection 2016    Diabetic ulcer of left forefoot associated with type 2 diabetes mellitus, with necrosis of bone ([address] 2019    Diabetic ulcer of right great toe associated with type 2 diabetes mellitus, with necrosis of bone ([address] 2019    Failed soft 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 toe amputation site 10/26/2020    History of hyperbaric oxygen therapy 10/28/2020    [facility]- [facility] - compromised flap  Migraine     Possible perforated tympanic membrane     as a result of recurrent otitis    Post-op hematoma of left foot 2020    Recurrent otitis media     Septic shock (HCC)     Syncope     Tear of medial meniscus of left knee     Tobacco use     Toe osteomyelitis, left ([address] 2020       PAST SURGICAL HISTORY    Past Surgical History:   Procedure Laterality Date    FOOT DEBRIDEMENT Left 2022    ULCER DEBRIDEMENT LEFT FOOT performed by Rachel Murcia DPM at [facility] ARTHROSCOPY Left [id number]    LEFT KNEE ARTHROSCOPY , SYNOVECTOMY       OTHER SURGICAL HISTORY Left 2020    PARTIAL LEFT FOOT AMPUTATION    TOE AMPUTATION Right 2019    PARTIAL RIGHT FOOT AMPUTATION performed by Rachel Murcia DPM at [address] TOE AMPUTATION Left 2020    PARTIAL LEFT FOOT AMPUTATION performed by Rachel Murcia DPM at [address] TOE AMPUTATION Left 10/22/2020    [facility]         FAMILY HISTORY    Family History   Problem Relation Age of Onset    Diabetes Mother     High Blood Pressure Mother     High Cholesterol Mother     Diabetes Father     High Blood Pressure Father     High Cholesterol Father     Stroke Father     High Blood Pressure Sister     High Blood Pressure Maternal Uncle     High Cholesterol Maternal Uncle     High Blood Pressure Maternal Grandmother        SOCIAL HISTORY    Social History     Tobacco Use    Smoking status: Former Smoker     Packs/day: 0.50     Years: 2.00     Pack years: 1.00     Quit date: 2005     Years since quittin.4    Smokeless tobacco: Former User     Quit date: 2005    Tobacco comment: SMOKED OCCASIONALLY UNTIL 8 YEARS AGO   Vaping Use    Vaping Use: Never used   Substance Use Topics    Alcohol use: Yes     Comment: RARELY    Drug use: No       ALLERGIES    Allergies   Allergen Reactions    Januvia [Sitagliptin] Nausea Only     Has taken metformin without side effects in the past.  Nausea with Janumet in the past.     Metformin And Related      GI Upset    Vancomycin      Pt had red face, swelling itching of eyelids, sore throat after receiving vancmomyin and cefepime. I think this was a histamine releasing reaction from vancomycin most likely. The cefepime was switched to Zosyn and patient had no reaction to Zosyn    Mustard Oil [Allyl Isothiocyanate] Swelling and Rash       MEDICATIONS    No current facility-administered medications on file prior to encounter. Current Outpatient Medications on File Prior to Encounter   Medication Sig Dispense Refill    Insulin Degludec (TRESIBA FLEXTOUCH) 200 UNIT/ML SOPN INJECT 76 UNITS INTO THE SKIN NIGHTLY 9 mL 0    losartan (COZAAR) 50 MG tablet TAKE 1 TABLET BY MOUTH DAILY 90 tablet 1    tiZANidine (ZANAFLEX) 4 MG tablet TAKE 2 TABLETS BY MOUTH NIGHTLY 180 tablet 0    carvedilol (COREG) 12.5 MG tablet TAKE 1 TABLET BY MOUTH 2 TIMES DAILY (WITH MEALS) 180 tablet 0    rosuvastatin (CRESTOR) 20 MG tablet TAKE 1 TABLET BY MOUTH NIGHTLY 90 tablet 0    traZODone (DESYREL) 50 MG tablet TAKE 1 TABLET BY MOUTH NIGHTLY 90 tablet 0    furosemide (LASIX) 20 MG tablet TAKE 1 TABLET BY MOUTH DAILY 90 tablet 0    gabapentin (NEURONTIN) 400 MG capsule TAKE 2 CAPSULES BY MOUTH 3 TIMES DAILY FOR 90 DAYS. 540 capsule 0    insulin lispro (HUMALOG KWIKPEN) 200 UNIT/ML SOPN pen Inject 25 Units into the skin 3 times daily (before meals) 5 pen 2    glimepiride (AMARYL) 4 MG tablet TAKE 1 TABLET BY MOUTH EVERY MORNING (BEFORE BREAKFAST). 90 tablet 0    PARoxetine (PAXIL) 10 MG tablet TAKE 1 TABLET BY MOUTH EVERY MORNING 90 tablet 0    blood glucose test strips (ONETOUCH ULTRA) strip USE TO TEST BLOOD GLUCOSE DAILY. DX:E11.9 100 strip 0    Blood Glucose Monitoring Suppl (CONTOUR NEXT EZ) w/Device KIT Use to test glucose daily.   DX:E11.9 1 kit 0    Insulin Pen Needle 32G X 6 MM MISC Use with insulin daily . DX:E11.9 100 each 3    blood glucose monitor strips Use to test blood sugar daily.   DX:E11.9 100 strip 3    oxymetazoline (AFRIN) 0.05 % nasal spray 2 sprays by Nasal route daily Indications: prior to HBO      loratadine (CLARITIN) 10 MG tablet Take 10 mg by mouth nightly as needed       sildenafil (VIAGRA) 100 MG tablet TAKE 1 TABLET BY MOUTH AS NEEDED FOR ERECTILE DYSFUNCTION 15 tablet 0       Objective    BP (!) 144/107   Pulse 81   Temp 100.3 °F (37.9 °C)   Resp 25   Ht 6' 1\" (1.854 m)   Wt 289 lb 11 oz (131.4 kg)   SpO2 94%   BMI 38.22 kg/m²     LABS:  WBC:    Lab Results   Component Value Date    WBC 21.6 02/02/2022     H/H:    Lab Results   Component Value Date    HGB 7.5 02/02/2022    HCT 22.7 02/02/2022     PTT:    Lab Results   Component Value Date    APTT 67.1 01/24/2022   [APTT}  PT/INR:    Lab Results   Component Value Date    PROTIME 15.5 01/26/2022    INR 1.35 01/26/2022     HgBA1c:    Lab Results   Component Value Date    LABA1C 10.6 01/23/2022       Assessment   Shimon Risk Score: Shimon Scale Score: 10    Patient Active Problem List   Diagnosis Code    Hypertension I10    Uncontrolled type 2 diabetes mellitus with diabetic polyneuropathy (HCC) E11.42, E11.65    Cardiomyopathy (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    Antibiotic-associated diarrhea K52.1, T36.95XA    Acute encephalopathy G93.40    Second degree burn of multiple fingers of left hand excluding thumb T23.232A Measurements:  Negative Pressure Wound Therapy Foot Left;Dorsal (Active)   $ Standard NPWT <=50 sq cm PER TX $ Yes 02/02/22 1134   Wound Type Surgical 02/02/22 1134   Unit Type Vac Ulta with Veraflo 02/02/22 1134   Dressing Type Non-adherant;Black foam;Veraflo 02/02/22 1134   Number of pieces used 1 02/02/22 1134   Cycle Continuous 02/02/22 1134   Target Pressure (mmHg) 125 02/02/22 1134   Intensity 1 02/02/22 1134   Irrigation Solution Sodium chloride 0.9% 02/02/22 1134   Instillation Volume  14 mL 02/02/22 1134   Soak Time  3 minutes 02/02/22 1134   Vac Frequency 2 hours 02/02/22 1134   Dressing Changed Changed/New 02/02/22 1134   Drainage Amount Small 02/02/22 1134   Drainage Description Sanguinous 02/02/22 1134   Dressing Change Due 02/04/22 02/02/22 1134   Wound Assessment Yellow;Fragile; Red 02/02/22 1134   Odor None 02/02/22 1134   Number of days: 0       Wound 01/24/22 Hand Left;Dorsal (Active)   Wound Etiology Burn 02/01/22 0400   Dressing Status New dressing applied 02/02/22 0800   Wound Cleansed Wound cleanser 02/01/22 0400   Dressing/Treatment Triad hydro/zinc oxide-based hydrophilic paste;Gauze dressing/dressing sponge 02/01/22 0400   Dressing Change Due 02/01/22 02/01/22 0400   Wound Assessment Bleeding;Pink/red 01/30/22 2000   Drainage Amount Small 02/01/22 0400   Drainage Description Serosanguinous 02/01/22 0400   Odor Mild 02/01/22 0400   Shanita-wound Assessment Blanchable erythema; Intact; Warm 02/01/22 0400   Number of days: 9       Wound 01/24/22 Foot Left;Dorsal I & D to left dorsal foot by Dr. Leo Cisneros, surgical wound (Active)   Wound Image   02/02/22 1139   Wound Etiology Diabetic 02/01/22 0400   Dressing Status New dressing applied 02/02/22 0800   Wound Cleansed Cleansed with saline 02/02/22 0800   Dressing/Treatment Packing;Dry dressing;Roll gauze 02/02/22 0800   Dressing Change Due 02/01/22 02/01/22 0400   Wound Length (cm) 6.5 cm 02/02/22 1139   Wound Width (cm) 3.7 cm 02/02/22 1139   Wound Depth (cm) 2.1 cm 02/02/22 1139   Wound Surface Area (cm^2) 24.05 cm^2 02/02/22 1139   Wound Volume (cm^3) 50.505 cm^3 02/02/22 1139   Drainage Amount Moderate 02/02/22 0800   Drainage Description Serosanguinous 02/02/22 0800   Odor None 02/02/22 0800   Number of days: 9       Wound 01/30/22 Sacrum SDTI (Active)   Wound Etiology Deep tissue/Injury 02/01/22 0900   Dressing Status Clean;Dry; Intact; New drainage noted 02/01/22 0900   Dressing/Treatment Open to air 02/02/22 0800   Wound Length (cm) 5 cm 01/30/22 0230   Wound Width (cm) 4 cm 01/30/22 0230   Wound Surface Area (cm^2) 20 cm^2 01/30/22 0230   Wound Assessment Fluid filled blister; Purple/maroon;Ruptured blister 02/02/22 0800   Drainage Amount Small 02/01/22 0400   Drainage Description Thin;Brown 02/01/22 0400   Shanita-wound Assessment Blanchable erythema 02/01/22 0400   Number of days: 3     Incision 02/01/22 Foot Anterior; Left (Active)   Dressing Status Dry 02/01/22 1110   Drainage Amount None 02/01/22 1110   Odor None 02/01/22 1110   Number of days: 1     medial; left hand:  100% purple discoloration, not open, nonblanchable redness to left proximal thumb. Additional burn area? Left hand and Left lateral 5th finger:  30% black and yellow scattered nonviable tissue, 70% pink moist tissue. Surrounding skin with scattered areas of full thickness skin loss. Dorsal left hand:  6.5x5.5x0.8cm, 2 areas of depth in center with seropurulent drainage. Sacrum:  Evolving deep tissue injury, POA, [id number], 80% dark nonblanchable purple tissue beginning to slough, 20% red nonblanchable tissue extending to right buttock. Wound will continue to evolve, deep tissue injury, likely to evolve  to stage 3 or 4 pressure injury, even with optimal treatment and prevention measures. Right ear:  95% black and yellow tissue, 5% purple discoloration no soi      Response to treatment:  Well tolerated by patient.      Pain Assessment:  Severity:  0 / 10  Quality of pain: N/A  Wound Pain Timing/Severity: none  Premedicated: Yes    Plan  Venelex initiated by staff to sacrum and right ear  Continue Venelex to promote blood supply to injured tissue.      Plan of Care: Wound 01/24/22 Hand Left;Dorsal-Dressing/Treatment: Triad hydro/zinc oxide-based hydrophilic paste,Gauze dressing/dressing sponge  Wound 01/24/22 Foot Left;Dorsal I & D to left dorsal foot by Dr. Ilya Rodrigues, surgical wound-Dressing/Treatment: Packing,Dry dressing,Roll gauze  Wound 01/30/22 Sacrum SDTI-Dressing/Treatment: Open to air (Venelex)    Specialty Bed Required : Yes   [x] Low Air Loss  In place  [] Pressure Redistribution  [] Fluid Immersion  [] Bariatric  [] Total Pressure Relief  [] Other:     Current Diet: Diet NPO  ADULT TUBE FEEDING; Orogastric; Renal Formula; Continuous; 20; Yes; 20; Q 4 hours; 40; 30; Q 4 hours; Protein; one proteinex P2Go TWICE daily via feeding tube  Dietician consult:  Yes    Discharge Plan:  Placement for patient upon discharge: TBD  Patient appropriate for Outpatient [address]: Yes    Referrals:  [x]   [] [facility]  [] Supplies  [] Other    Patient/Caregiver Teaching:  Level of patient/caregiver understanding able to:   [x] Indicates understanding       [x] Needs reinforcement  [] Unsuccessful      [] Verbal Understanding  [] Demonstrated understanding       [] No evidence of learning  [] Refused teaching         [] N/A       Electronically signed by Yahaira Johnston RN, CWOCN on 2/2/2022 at 1:05 PM

## 2022-02-02 NOTE — PROGRESS NOTES
Found pt on pressure support 10/8. Pt was high pressure consistenly throughout the day. Pt sedation is precedex only. Pt is doing very well on PS 10/5 45%. Will have RT switch if vitals change.

## 2022-02-02 NOTE — PROGRESS NOTES
Hospitalist Progress Note      PCP: Pat Gaffney MD    Date of Admission: 1/22/2022     Admitted with acute hypoxic respiratory failure, septic shock and MSSA bacteremia  Left foot abscess drained 1/24/2022  Cardiopulmonary arrest x2 on 1/23/2022, required CPR  Went with acute kidney injury currently on CRRT    Subjective: on CRRT, still on levophed  Off epinephrine  On insulin drip    1/27  Failed SBT    CRRT stopped-Lasix 100 mg IV x1 given  Off levophed     1/28  Not making urine despite receiving Lasix 100 mg IV  Increased tachypnea and hypoxia off sedation  On Precedex    1/29  Currently on hemodialysis  On Precedex only. Off all the sedation. Doing well on SBT, however not waking up. Fever     1/30  Currently only on Precedex. Not following any commands or having any purposeful movements. T-max 101.2    1/31- Spiked fevers over the weekend. On the ventilator. Not following commands. Mom at bedside. She said he has opened his eyes. 2/1- MRI of the left foot was done. It showed an abscess/septic joint and osteomyelitis. On Precedex. He went to the OR today. Ulcer debridement of left foot was done by Dr Iliana Casiano. 2/2-opens eyes to verbal commands. On the ventilator. Low-grade fever. Hemodialysis with 2 L of fluid removal.  Not making much urine.       Medications:  Reviewed    Infusion Medications    sodium chloride      insulin 5.28 Units/hr (02/02/22 1222)    dexmedetomidine HCl in NaCl 2 mcg/kg/hr (02/02/22 1249)    sodium chloride 5 mL/hr at 02/01/22 1041    dextrose       Scheduled Medications    nafcillin  2,000 mg IntraVENous Q4H    Venelex   Topical BID    heparin (porcine)  5,000 Units SubCUTAneous 3 times per day    famotidine (PEPCID) injection  20 mg IntraVENous Daily    sodium chloride flush  5-40 mL IntraVENous 2 times per day    chlorhexidine  15 mL Mouth/Throat BID     PRN Meds: oxyCODONE-acetaminophen **OR** oxyCODONE-acetaminophen, sodium chloride, fentanNYL, albumin human, heparin (porcine), midazolam, sodium chloride flush, sodium chloride, acetaminophen **OR** acetaminophen, glucose, glucagon (rDNA), dextrose, dextrose bolus (hypoglycemia) **OR** dextrose bolus (hypoglycemia)      Intake/Output Summary (Last 24 hours) at 2/2/2022 1331  Last data filed at 2/2/2022 1122  Gross per 24 hour   Intake 3184.88 ml   Output 5562 ml   Net -2377.12 ml       Physical Exam Performed:    /77   Pulse 92   Temp 100.3 °F (37.9 °C)   Resp 28   Ht 6' 1\" (1.854 m)   Wt 289 lb 11 oz (131.4 kg)   SpO2 93%   BMI 38.22 kg/m²       Gen: intubated. Ill appearing. Eyes open to verbal command  Eyes: PERRL. No sclera icterus. No conjunctival injection. ENT: No discharge. Pharynx clear. Neck: Trachea midline. Normal thyroid. Resp: No accessory muscle use. + crackles. No wheezes. No rhonchi. CV: Regular rate. Regular rhythm. No murmur or rub. No edema. GI: Non-tender. Non-distended. No masses. No organomegaly. Normal bowel sounds. No hernia. Skin: burns left hand with some yellow slough. Chronic skin changes both legs.   Lymph: No cervical LAD. No supraclavicular LAD. M/S: No cyanosis. No joint deformity. No clubbing. Missing right big toe, left foot is covered. Neuro: Opens eyes to verbal commands. Can blink eyes. Labs:   Recent Labs     01/31/22  0420 02/01/22  0400 02/02/22  0500   WBC 32.2* 32.0* 21.6*   HGB 8.5* 8.1* 7.5*   HCT 26.1* 24.4* 22.7*    215 234     Recent Labs     01/31/22  0420 02/01/22  0515 02/02/22  0500   * 132* 131*   K 5.0 5.6* 5.0   CL 98* 97* 97*   CO2 17* 17* 16*   * 108* 91*   CREATININE 6.6* 6.1* 5.5*   CALCIUM 8.0* 8.2* 7.9*   PHOS 8.6* 9.2* 8.2*     Recent Labs     01/31/22  0420 02/01/22  0515 02/02/22  0500   * 63* 56*   ALT 13 6* 7*   BILIDIR 0.4* 0.4* 1.1*   BILITOT 0.7 0.6 1.4*   ALKPHOS 491* 437* 576*     No results for input(s): INR in the last 72 hours.   Recent Labs     01/31/22 0420   CKTOTAL 676* Results for Charlene Bonilla (MRN [id number]) as of 1/31/2022 23:27   Ref. Range 1/31/2022 04:20   GGT Latest Ref Range: 8 - 61 U/L 272 (H)   Results for Charlene Bonilla (MRN [id number]) as of 1/31/2022 23:27   Ref. Range 1/31/2022 04:20   Total CK Latest Ref Range: 39 - 308 U/L 676 (H)       Urinalysis:      Lab Results   Component Value Date    NITRU Negative 01/22/2022    WBCUA 0-2 01/22/2022    BACTERIA Rare 01/22/2022    RBCUA 0-2 01/22/2022    BLOODU TRACE-LYSED 01/22/2022    SPECGRAV >=1.030 01/22/2022    GLUCOSEU 500 01/22/2022       Radiology:  XR CHEST PORTABLE   Final Result   Increasing airspace opacification in the right lower lung zone that may   represent underlying pneumonitis. Asymmetric edema may also be considered in   this intubated patient. XR CHEST PORTABLE   Final Result   No significant interval change. MRI FOOT LEFT WO CONTRAST   Final Result   1. Diffuse subcutaneous edema with organized complex collection along the   dorsal soft tissues of the foot which communicates with large midfoot   effusion. The dorsal collection measures 2.0 x 4.0 x 4.1 cm. Findings   highly suspicious for abscess and septic joint given patient history. 2. Marrow signal changes throughout the midfoot and extending along the 2nd   through 5th metatarsals which are most suggestive of osteomyelitis in the   setting of soft tissue infection. Underlying Charcot arthropathy also   present. XR CHEST PORTABLE   Final Result   Cardiomegaly with left basilar effusion and bibasilar infiltrates. Stable support tubes. XR CHEST PORTABLE   Final Result   1. Stable lines, tubes, and support devices. 2. Bilateral airspace opacities with pleural effusions, left greater than   right. 3. Cardiomegaly. XR CHEST PORTABLE   Final Result   1. Stable lines, tubes, and support devices.    2. Stable cardiopulmonary status after accounting for differences in patient   positioning including bilateral airspace opacities. 3. Cardiomegaly. 4. Low lung volumes. CT HEAD WO CONTRAST   Final Result   1. No acute intracranial abnormality. XR CHEST PORTABLE   Final Result   CHF with increasing pulmonary edema. XR CHEST PORTABLE   Final Result   Patchy airspace disease, left greater than right which may represent   atelectasis or pneumonia. XR CHEST PORTABLE   Final Result   Stable support apparatus. Increasing bilateral airspace opacities which may be related to edema and/or   pneumonia. XR CHEST PORTABLE   Final Result   Low lung volumes/poor inspiratory effort limiting the study. No significant improvement. A mild cardiomegaly. Mild congestion and/or   infiltrates identified in the lungs. No pneumothorax. XR FOOT LEFT (2 VIEWS)   Final Result   1. Remote history of amputation at the 1st and 2nd digits. No evidence of   osteomyelitis at prior resection site. 2. Subtle erosions at the 3rd MTP joint are new. This is adjacent to soft   tissue swelling at the prior amputation site. A new focus of osteomyelitis   is suspected. 3. Soft tissue swelling and questionable subcutaneous gas along the lateral   aspect of the left foot. There is also severe disorganization of bone at the   2nd through 5th tarsometatarsal joints. Although pattern may represent   Charcot arthropathy due to the more diffuse appearance, areas of   osteomyelitis cannot be excluded with plain film. Correlate with physical   exam and clinical workup. A follow-up MRI may be helpful for further   evaluation.          XR CHEST PORTABLE   Final Result   Low lung volumes with cardiomegaly and vascular congestion, as well as patchy   airspace disease bilaterally, similar to the previous exam.         XR CHEST PORTABLE   Final Result   Status post advancement of right internal jugular central line with distal   tip now visualized near region of junction of superior vena cava and right   atrium. No evidence of pneumothorax. XR CHEST PORTABLE   Final Result   Improved lung volumes. Bilateral perihilar opacification, edema versus   infiltrate. Satisfactory position of endotracheal tube. Central line tip in either the   distal brachiocephalic vein or proximal SVC. XR CHEST PORTABLE   Final Result   Cardiomegaly with left mid and lower lung infiltrates. Support tubes as described above. XR CHEST 1 VIEW   Final Result   Limited chest as outlined above. Bilateral perihilar opacification, edema   versus infiltrate. XR CHEST PORTABLE   Final Result   Low lung volumes. No acute cardiopulmonary disease. XR CHEST PORTABLE    (Results Pending)     EEG 1/31/22  Impression: This EEG is abnormal. The generalized diffuse slowing is suggestive of severe diffuse encephalopathy. There is no evidence of epileptiform discharges, focal, or lateralizing abnormalities. Assessment/Plan:    Principal Problem (Resolved):    Septic shock (HCC)  Active Problems:    Uncontrolled type 2 diabetes mellitus with diabetic polyneuropathy (HCC)    Cardiomyopathy ([address]    Mixed hyperlipidemia    Acute osteomyelitis of left foot (HCC)    Acute renal failure (HCC)    MSSA bacteremia    Leukocytosis    Third degree burn of left hand    Acute hypoxemic respiratory failure (HCC)    Cardiopulmonary arrest (HCC)    Abscess of left foot    Hypertriglyceridemia    Staphylococcal arthritis of left foot (HCC)    Antibiotic-associated diarrhea    Acute encephalopathy    Second degree burn of multiple fingers of left hand excluding thumb  Resolved Problems:    Hyperglycemia    Acute respiratory failure with hypoxia (HCC)    Syncope    Acute kidney injury ([address]    Uremia         #MSSA bacteremia. MSSA foot abscess. Septic shock. started Ancef.  ID consultation. Harini Kumar reviewed. Currently on Levophed. Epinephrine DC'd. Continue Solu-Cortef. now off levo  Antibiotics changed to IV cefepime and Zyvox 1/30 given persistent fevers. Culture sent. MRI of the left foot done. It showed a fluid collection likely an abscess/septic joint and osteomyelitis. Ulcer debridement done. ID wants to stop broad spectrum antibiotics. They have started Nafcillin. #RODRICK. [place] Phlegm admitted to hospital with RODRICK.  Normal renal function October 2021. [place] Phlegm is suspected to be prerenal/ATN.  Creatinine worsened.  Nephrology consulted. [facility] was started on IV fluids  Emergent Vas-Cath placed and CRRT started.  Critical care consulted  CRRT stopped 1/27--> Lasix 100 mg IV x1 given--no response. On hemodialysis.        #Acute respiratory failure. Suspect patient developed acute pulmonary edema causing acute respiratory failure from renal failure. Intubated 1/23/22.  Pulmonary consulted. Not having purposeful movements or following commands. obtain head CT-negative for acute findings. EEG ordered. Bronc with BAL and cultures 1/29. He is following some simple commands. EEG negative for seizures.     #H/o cardiomyopathy - checked echo with EF 35%, cardio consulted     #S/p PEA arrest 1/23/22     # Left foot abscess - s/p I&D, ID and podiatry consulted, cx sent-Staph aureus. I and D done. Will likely need to go back to the OR for more surgery. Plans for wound VAC. A. fib with controlled ventricular rate- in NSR  On heparin drip     #Diabetes mellitus type 2 uncontrolled.  Insulin.  Continue sliding scale. Lantus 55 units twice daily. monitor sugars. Started on insulin drip 1/30     #Hypertension. BP was soft but now improved.        Heparin gtt for DVT prophylaxis.       Diet: Diet NPO  ADULT TUBE FEEDING; Orogastric; Renal Formula; Continuous; 20; Yes; 20; Q 4 hours; 40; 30; Q 4 hours; Protein; one proteinex P2Go TWICE daily via feeding tube  Code Status: Full Code    PT/OT Eval Status: ordered    Dispo - cont care    Lisset Amador MD 2/2/2022 1:31 PM

## 2022-02-02 NOTE — PROGRESS NOTES
Progress Note    HISTORY     CC:  AMS          We are following for acute kidney injury       Subjective/   HPI:    Remains in the ICU. More awake. Last HD on 2/2 with 2 liters removed, post-weight of 131.4 kg. Ran with lines reversed at lower blood flow of 250-300 ml/min. Renal function remains poor, not making urine or showing signs of renal recovery. ROS:  Constitutional:  + fevers  Respiratory: On vent  :  Anuric     EXAM       Objective/     Vitals:    02/02/22 1000 02/02/22 1022 02/02/22 1117 02/02/22 1122   BP: 113/66   127/68   Pulse: 71   79   Resp: 23   30   Temp:  100.8 °F (38.2 °C)  100.3 °F (37.9 °C)   TempSrc:  Bladder     SpO2: 97%   97%   Weight:    289 lb 11 oz (131.4 kg)   Height:   6' 1\" (1.854 m)      24HR INTAKE/OUTPUT:      Intake/Output Summary (Last 24 hours) at 2/2/2022 1143  Last data filed at 2/2/2022 1122  Gross per 24 hour   Intake 3184.88 ml   Output 5562 ml   Net -2377.12 ml     Constitutional:  Critically ill   Eyes:  Pupils reactive, sclera clear   Neck:  Normal thyroid, no masses   Cardiovascular:  Regular, no rub; + LE edema  Respiratory: On vent, no wheezing  Psychiatry:  Sedated on vent   Abdomen: +bs, soft, nt, no masses   Musculoskeletal: No LE edema, no clubbing   Lymphatics:  No LAD in neck, no supraclavicular nodes       MEDICAL DECISION MAKING       Data/  Recent Labs     01/31/22  0420 02/01/22  0400 02/02/22  0500   WBC 32.2* 32.0* 21.6*   HGB 8.5* 8.1* 7.5*   HCT 26.1* 24.4* 22.7*   MCV 93.0 91.7 92.0    215 234     Recent Labs     01/31/22  0420 02/01/22  0515 02/02/22  0500   * 132* 131*   K 5.0 5.6* 5.0   CL 98* 97* 97*   CO2 17* 17* 16*   GLUCOSE 133* 152* 222*   PHOS 8.6* 9.2* 8.2*   * 108* 91*   CREATININE 6.6* 6.1* 5.5*   LABGLOM 9* 10* 11*   GFRAA 11* 12* 14*       Assessment/     RODRICK likely related to prerenal factors in the setting of sepsis, use of diuretics and losartan contributing to multifactorial ATN.   UA is not suggestive of staph associated glomerulonephritis.   Patient did not respond to IV fluid and developed acute pulmonary edema and renal replacement therapy initiated on 1/23/22   No signs of renal recovery, now on HD MWF schedule     -Acute pulmonary edema              Status post intubation   On vent      -Acute hypoxic respiratory failure     -Cardio respiratory arrest     -Hyperkalemia     -Sepsis with MSSA bacteremia with left foot abscess s/p drainage, osteomyelitis     -Morbid obesity     -Diabetes, uncontrolled    Plan/     Next HD Friday with UF as tolerated with prn albumin, crit line monitoring  Trend labs, bp's, & urine output  Does not appear he will quickly recover so likely to need TDC placed but will need fevers to resolve

## 2022-02-02 NOTE — PROGRESS NOTES
02/02/22 0337   Vent Information   Vent Type 980   Vent Mode AC/VC   Vt Ordered 480 mL   Rate Set 22 bmp   Peak Flow 70 L/min   FiO2  45 %   SpO2 98 %   SpO2/FiO2 ratio 217.78   Sensitivity 3   PEEP/CPAP 5   Humidification Source Heated wire   Humidification Temp 37   Humidification Temp Measured 36.8   Circuit Condensation Drained   Vent Patient Data   Peak Inspiratory Pressure 19 cmH2O   Mean Airway Pressure 10 cmH20   Rate Measured 33 br/min   Vt Exhaled 507 mL   Minute Volume 16.7 Liters   I:E Ratio 1;1.4   Plateau Pressure 28 CAU99   Cough/Sputum   Sputum How Obtained Endotracheal;Suctioned   Cough Productive   Sputum Amount Moderate   Sputum Color Creamy   Tenacity Thick   Spontaneous Breathing Trial (SBT) RT Doc   Pulse 84   Breath Sounds   Right Upper Lobe Rhonchi   Right Middle Lobe Diminished   Right Lower Lobe Diminished   Left Upper Lobe Rhonchi   Left Lower Lobe Diminished   Additional Respiratory  Assessments   Resp (!) 33   Position Semi-Springer's   Alarm Settings   High Pressure Alarm 45 cmH2O   Low Minute Volume Alarm 3 L/min   High Respiratory Rate 50 br/min   Low Exhaled Vt  300 mL   Patient Observation   Observations ETT SIZE 7.5, SECURED AT 24 LIP LINE. AMBU BAG AT HEAD OF BED. WATER GOOD.     ETT (adult)   Placement Date: 01/23/22   Preoxygenation: Yes  Technique: Direct laryngoscopy  Type: Cuffed  Tube Size: 7.5 mm  Insertion attempts: 1  Secured at: 24 cm  Measured From: Lips   Secured at 24 cm   Measured From [address] By [employer] tube wheeler   Site Condition Dry

## 2022-02-02 NOTE — FLOWSHEET NOTE
Treatment time: 3.5hrs    Net UF: 2 liters    Pre weight: 132.8kg bedscale  Post weight: 131.4kg  EDW: TBD    Access used: RFem Vascath  Access function: Positional w/lines reversed 250-350 Qb    Medications or blood products given: Albumin 25gm and Heparin dwells    Regular outpatient schedule: MWF    Summary of response to treatment:      02/02/22 1122   Vital Signs   /68   Temp 100.3 °F (37.9 °C)   Pulse 79   Resp 30   SpO2 97 %   Weight 289 lb 11 oz (131.4 kg)   Weight Method Bed scale   Percent Weight Change -1.05   Pain Assessment   Pain Assessment CPOT   Pain Level 0   Post-Hemodialysis Assessment   Post-Treatment Procedures Blood returned;Catheter capped, clamped and heparinized x 2 ports   Machine Disinfection Process Acid/Vinegar Clean;Heat Disinfect; Exterior Machine Disinfection   Rinseback Volume (ml) 400 ml   Total Liters Processed (l/min) 53.7 l/min   Dialyzer Clearance Moderately streaked   Duration of Treatment (minutes) 210 minutes   Heparin amount administered during treatment (units) 0 units   Hemodialysis Intake (ml) 500 ml   Hemodialysis Output (ml) 2512 ml   NET Removed (ml) 2012 ml   Tolerated Treatment Fair   Patient Response to Treatment   (HD completed. Pt restless and agitated at beginning of tx. V)   Bilateral Breath Sounds Clear;Diminished   Edema Generalized   Physician Notified? Yes   Copy of dialysis treatment record placed in chart, to be scanned into EMR.

## 2022-02-02 NOTE — FLOWSHEET NOTE
02/02/22 1134   Negative Pressure Wound Therapy Foot Left;Dorsal   Placement Date/Time: 02/02/22 1100   Pre-existing: No  Inserted by: Yannick Bolivar CWSILVESTREN  Location: Foot  Wound Location Orientation: Left;Dorsal   $ Standard NPWT <=50 sq cm PER TX $ Yes   Wound Type Surgical   Unit Type Vac Ulta with Veraflo   Dressing Type Non-adherant;Black foam;Veraflo   Number of pieces used 1  (and 3 pcs of vaseline gauze)   Cycle Continuous   Target Pressure (mmHg) 125   Intensity 1   Irrigation Solution Sodium chloride 0.9%   Instillation Volume  14 mL   Soak Time  3 minutes   Vac Frequency 2 hours   Dressing Changed Changed/New   Drainage Amount Small   Drainage Description Sanguinous   Dressing Change Due 02/04/22   Wound Assessment Yellow;Fragile; Red  (exposed tendon, bone palpated in base)   Odor None   Left foot:  Surgical wound, I&D per Dr ramirez on 2/1, 35% red, moist tissue, Bleeding at edges. 65% exposed, moist viable tendon and yellow /gray slough . Edges are regular. No periwound redness, no odor or purulence noted. Bone palpated in base near 9 o'clock. Orders received for [facility] Veraflo placement per Dr Leslee Taylor to left foot. Blood oozing in scattered areas along edge, applied pressure without success. Slver Nitrate applied with success. Discussed with Dr Leslee Taylor. Periwound skin prepped using barrier wipe and vac drape. 3 small pieces of Vaseline gauze used over tendon, then one black veraflo foam .  Good seal obtained at 125mm hg.  Veraflo settings above. LLE wrapped with roll gauze and aces. Bilateral  heels floated off bed on pillow. Next VAC change on Friday.

## 2022-02-02 NOTE — PROGRESS NOTES
Pulmonary & Critical Care Medicine ICU Progress Note    CC: Septic shock, MSSA bacteremia, left foot abscess    Events of Last 24 hours:    Pt can become alert and track but does not follow commands and becomes agitated  Fever  Tolerated PSV day shift yesterday  Tolerated left foot I&D well    Vascular lines:    Right IJ 1/23  Right femoral Vas-Cath 1/23    MV: 1/23/22  Vent Mode: AC/VC Rate Set: 22 bmp/Vt Ordered: 480 mL/ /FiO2 : 45 %  Recent Labs     02/01/22  0940 02/02/22  0500   PHART 7.419 7.429   PEA6LES 26.9* 26.5*   PO2ART 73.6* 71.3*       IV:   insulin 9.52 Units/hr (02/02/22 0608)    dexmedetomidine HCl in NaCl 2 mcg/kg/hr (02/02/22 0600)    sodium chloride 5 mL/hr at 02/01/22 1041    dextrose         Vitals:  Blood pressure 136/63, pulse 84, temperature 101.1 °F (38.4 °C), resp. rate (!) 32, height 6' 1\" (1.854 m), weight 282 lb 6.6 oz (128.1 kg), SpO2 95 %. on vent    Intake/Output Summary (Last 24 hours) at 2/2/2022 0750  Last data filed at 2/2/2022 0600  Gross per 24 hour   Intake 2617.7 ml   Output 3073 ml   Net -455.3 ml     General: intubated, ill appearing    ENT: Pharynx with ETT. Resp: No crackles. No wheezing. CV: S1, S2. + edema  GI: NT, ND, +BS  Skin: Warm and dry. Neuro: PERRL. Grimaces to pain. not following commands.      Scheduled Meds:   nafcillin  2,000 mg IntraVENous Q4H    Venelex   Topical BID    heparin (porcine)  5,000 Units SubCUTAneous 3 times per day    famotidine (PEPCID) injection  20 mg IntraVENous Daily    sodium chloride flush  5-40 mL IntraVENous 2 times per day    chlorhexidine  15 mL Mouth/Throat BID       Data:  CBC:   Recent Labs     01/31/22  0420 02/01/22  0400 02/02/22  0500   WBC 32.2* 32.0* 21.6*   HGB 8.5* 8.1* 7.5*   HCT 26.1* 24.4* 22.7*   MCV 93.0 91.7 92.0    215 234     BMP:   Recent Labs     01/31/22  0420 02/01/22  0515 02/02/22  0500   * 132* 131*   K 5.0 5.6* 5.0   CL 98* 97* 97*   CO2 17* 17* 16*   PHOS 8.6* 9.2* 8.2* * 108* 91*   CREATININE 6.6* 6.1* 5.5*     LIVER PROFILE:   Recent Labs     01/31/22  0420 02/01/22  0515 02/02/22  0500   * 63* 56*   ALT 13 6* 7*   BILIDIR 0.4* 0.4* 1.1*   BILITOT 0.7 0.6 1.4*   ALKPHOS 491* 437* 576*       Microbiology:  1/22 [facility] MSSA  1/22 COVID-19 and influenza negative  1/23/22 Blood cx: NGTD  1/23 tracheal aspirate MSSA  1/24 Wound cx: MSSA  1/24 BC MSSA  1/29/22 BAL pending  1/30/2022 blood sent    Echo 1/25/22: EF 35%, global HK, reduced RV function    CXR 2/2/22   Increasing airspace opacification in the right lower lung zone that may   represent underlying pneumonitis.  Asymmetric edema may also be considered in   this intubated patient. MRI Left foot 2/1/22   Impression   1. Diffuse subcutaneous edema with organized complex collection along the   dorsal soft tissues of the foot which communicates with large midfoot   effusion.  The dorsal collection measures 2.0 x 4.0 x 4.1 cm.  Findings   highly suspicious for abscess and septic joint given patient history. 2. Marrow signal changes throughout the midfoot and extending along the 2nd   through 5th metatarsals which are most suggestive of osteomyelitis in the   setting of soft tissue infection.  Underlying Charcot arthropathy also   present. 1/31/22 EEG:This EEG is abnormal. The generalized diffuse slowing is suggestive of severe diffuse encephalopathy. There is no evidence of epileptiform discharges, focal, or lateralizing abnormalities.     ASSESSMENT:  · Acute hypoxemic respiratory failure   · Septic shock    · Anoxic encephalopathy  · MSSA bacteremia  · Hypertriglyceridemia   · L foot abscess drained 1/24/2022, MSSA; MRI with large fluid collection and changes c/w osteomyelitis   · Cardiopulmonary arrest x 2 1/23/2022, required CPR, TTM - rewarmed 8pm 1/25/22  · Paroxysmal AFIB   · Cardiomyopathy EF 35% on Echo 1/24/22  · Acute pulmonary edema/fluid overload  · Acute kidney failure  · DM with hyperglycemia  · Left hand burn  · History of cardiomyopathy - last echo 2014 EF 50%     PLAN:  PSV as tolerated  Mechanical ventilation as per my orders. The ventilator was adjusted by me at the bedside for unstable, life threatening respiratory failure. · Precedex and PRN sedatives only for ventilator tolerance  · Was on IV Ancef per infectious disease  · Nephrology following for HD  · TF  · Insulin gtt   · Prophylaxis: SQ heparin, Pepcid  Total critical care time caring for this patient with life threatening, unstable organ failure, including direct patient contact, management of life support systems, review of data including imaging and labs, discussions with other team members and physicians is 35 minutes so far today, excluding procedures.

## 2022-02-02 NOTE — PROGRESS NOTES
Care rounds completed with Dr. Aldo Landers and multidisciplinary team. Reviewed labs, meds, VS, assessment, & plan of care for today. Added percocet from podiatry/ wound vac. Allow patient to SBT through day as tolerated- no plans to Extubate. HD currently, plan to remove 2L.  See dictated note and new orders for details.       High risk vesicant drug infusing:     Multiple incompatible medications infusing:      CVP Monitoring:      Extremely difficult IV access challenge:      Continued need for central line access:  YES    Addressed with physician:  YES    RIGHT PATIENT, RIGHT TIME, RIGHT LINE

## 2022-02-02 NOTE — PROGRESS NOTES
02/01/22 1951   Vent Information   Vent Type 980   Vent Mode PS   Pressure Support 10 cmH20   FiO2  45 %   SpO2 94 %   SpO2/FiO2 ratio 208.89   PEEP/CPAP 5   Humidification Source Heated wire   Humidification Temp 37   Humidification Temp Measured 36.8   Circuit Condensation Drained   Vent Patient Data   Peak Inspiratory Pressure 16 cmH2O   Mean Airway Pressure 9.7 cmH20   Rate Measured 35 br/min   Vt Exhaled 480 mL   Minute Volume 17.9 Liters   I:E Ratio 1:1.6   Cough/Sputum   Sputum How Obtained Suctioned;Endotracheal   Sputum Amount Moderate   Sputum Color Creamy   Tenacity Thick   Spontaneous Breathing Trial (SBT) RT Doc   Pulse 70   Breath Sounds   Right Upper Lobe Rhonchi   Right Middle Lobe Diminished   Right Lower Lobe Diminished   Left Upper Lobe Rhonchi   Left Lower Lobe Diminished   Additional Respiratory  Assessments   Resp 29   Position Semi-Springer's   Oral Care Completed?  No   Alarm Settings   High Pressure Alarm 45 cmH2O   Low Minute Volume Alarm 3 L/min   Apnea (secs) 20 secs   High Respiratory Rate 50 br/min   Low Exhaled Vt  300 mL   ETT (adult)   Placement Date: 01/23/22   Preoxygenation: Yes  Technique: Direct laryngoscopy  Type: Cuffed  Tube Size: 7.5 mm  Insertion attempts: 1  Secured at: 24 cm  Measured From: Lips   Secured at 25 cm   Measured From [address] [employer] tube wheeler   Site Condition Dry

## 2022-02-03 PROBLEM — A49.1 ENTEROCOCCAL INFECTION: Status: ACTIVE | Noted: 2022-01-01

## 2022-02-03 PROBLEM — M65.142: Status: ACTIVE | Noted: 2022-01-01

## 2022-02-03 NOTE — PROGRESS NOTES
Care rounds completed with Dr. Ban Carter and multidisciplinary team. Reviewed labs, meds, VS, assessment, & plan of care for today. Keep intubated. Consulting Ortho per Dr Nelly Alarcon.  See dictated note and new orders for details.      High risk vesicant drug infusing:     Multiple incompatible medications infusing:      CVP Monitoring:      Extremely difficult IV access challenge:      Continued need for central line access:  YES    Addressed with physician:  YES    RIGHT PATIENT, RIGHT TIME, RIGHT LINE

## 2022-02-03 NOTE — FLOWSHEET NOTE
02/03/22 0730   Vitals   /64   Temp 100.1 °F (37.8 °C)   Temp Source Bladder   Pulse 69   Resp 27   SpO2 96 %   1 of 1 units of blood started. Will stay with patient for first 15 min- assessing for any reaction symptoms. Bedside report received from [facility]. Pt appears to have no adverse reaction at this time.

## 2022-02-03 NOTE — PROGRESS NOTES
Progress Note    HISTORY     CC:  AMS          We are following for acute kidney injury       Subjective/   HPI:    Remains in the ICU. More awake. Last HD on 2/2 with 2 liters removed, post-weight of 131.4 kg. Ran with lines reversed at lower blood flow of 250-300 ml/min. Renal function remains poor, not making much urine or showing signs of renal recovery. ROS:  Constitutional:  + fevers  Respiratory: On vent  :  Anuric     EXAM       Objective/     Vitals:    02/03/22 0600 02/03/22 0653 02/03/22 0700 02/03/22 0708   BP: 124/62 125/63 125/63    Pulse: 70 72 72 72   Resp: 28 30 29 30   Temp:  100.7 °F (38.2 °C) 100.7 °F (38.2 °C) 100.7 °F (38.2 °C)   TempSrc:   Bladder Bladder   SpO2: 95% 95% 95% 95%   Weight:       Height:         24HR INTAKE/OUTPUT:      Intake/Output Summary (Last 24 hours) at 2/3/2022 0732  Last data filed at 2/3/2022 0700  Gross per 24 hour   Intake 3251.26 ml   Output 2562 ml   Net 689.26 ml     Constitutional:  Critically ill   Eyes:  Pupils reactive, sclera clear   Neck:  Normal thyroid, no masses   Cardiovascular:  Regular, no rub; + LE edema  Respiratory: On vent, no wheezing  Psychiatry:  Sedated on vent   Abdomen: +bs, soft, nt, no masses   Musculoskeletal: No LE edema, no clubbing   Lymphatics:  No LAD in neck, no supraclavicular nodes       MEDICAL DECISION MAKING       Data/  Recent Labs     02/01/22  0400 02/02/22  0500 02/03/22  0433   WBC 32.0* 21.6* 17.8*   HGB 8.1* 7.5* 6.6*   HCT 24.4* 22.7* 20.2*   MCV 91.7 92.0 93.0    234 263     Recent Labs     02/01/22  0515 02/02/22  0500 02/03/22  0433   * 131* 132*   K 5.6* 5.0 4.9   CL 97* 97* 98*   CO2 17* 16* 19*   GLUCOSE 152* 222* 159*   PHOS 9.2* 8.2* 8.7*   * 91* 78*   CREATININE 6.1* 5.5* 5.3*   LABGLOM 10* 11* 12*   GFRAA 12* 14* 14*       Assessment/     RODRICK likely related to prerenal factors in the setting of sepsis, use of diuretics and losartan contributing to multifactorial ATN. UA is not suggestive of staph associated glomerulonephritis.   Patient did not respond to IV fluid and developed acute pulmonary edema and renal replacement therapy initiated on 1/23/22   No signs of renal recovery, now on HD MWF schedule     -Acute pulmonary edema              Status post intubation   On vent      -Acute hypoxic respiratory failure     -Cardio respiratory arrest     -Hyperkalemia     -Sepsis with MSSA bacteremia with left foot abscess s/p drainage, osteomyelitis     -Anemia - prn prbc's     -Diabetes, uncontrolled    Plan/     -Next HD Friday with UF as tolerated with prn albumin, crit line monitoring  -Plan to remove HD line after HD tomorrow and then new line on Monday, vascath or TDC pending on if still having fevers  -Trend labs, bp's, cultures, & urine output
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
## 2022-02-03 NOTE — PROGRESS NOTES
Pt remains on SBT 8/5, FiO2 45%         02/03/22 1526   Vent Information   Vt Ordered 0 mL   Rate Set 0 bmp   Peak Flow 0 L/min   Pressure Support 8 cmH20   FiO2  45 %   SpO2 98 %   SpO2/FiO2 ratio 217.78   Sensitivity 3   PEEP/CPAP 5   I Time/ I Time % 0 s   Humidification Source Heated wire   Humidification Temp Measured 37   Vent Patient Data   High Peep/I Pressure 0   Peak Inspiratory Pressure 14 cmH2O   Mean Airway Pressure 8.4 cmH20   Rate Measured 29 br/min   Vt Exhaled 445 mL   Minute Volume 13.3 Liters   I:E Ratio 1:1.90   Cough/Sputum   Sputum How Obtained None   Spontaneous Breathing Trial (SBT) RT Doc   Pulse 81   Breath Sounds   Right Upper Lobe Clear;Diminished   Right Middle Lobe Diminished   Right Lower Lobe Diminished   Left Upper Lobe Clear;Diminished   Additional Respiratory  Assessments   Resp 28   Position Reverse Trendelenburg   Oral Care Completed? Yes   Oral Care Mouth suctioned   Subglottic Suction Done?  Yes   Alarm Settings   High Pressure Alarm 45 cmH2O   Low Minute Volume Alarm 3 L/min   High Respiratory Rate 50 br/min   ETT (adult)   Placement Date: 01/23/22   Preoxygenation: Yes  Technique: Direct laryngoscopy  Type: Cuffed  Tube Size: 7.5 mm  Insertion attempts: 1  Secured at: 24 cm  Measured From: Lips   Secured at 24 cm   Measured From [address] By [employer] tube wheeler   Site Condition Dry

## 2022-02-03 NOTE — PROGRESS NOTES
[facility] Infectious Disease Progress Note      Spring Olvera     : 1975    DATE OF VISIT:  2/3/2022  DATE OF ADMISSION:  2022       Subjective:     Spring Olvera is a 42 y.o. male whom I've been seeing for an MSSA foot abscess, septic arthritis, acute osteo, bacteremia and multiorgan failure. Since I last saw him, he had a VAC dressing placed on his foot yesterday. Remains on the vent, but pressure support at times. A bit more awake and alert at times, not really following commands yet. Still nearly anuric; next HD planned for tomorrow, then I'm told his temporary right femoral HD line will be switched out to a tunneled (presumably IJ) line. Not a lot in the way of ETT secretions, still some diarrhea, still fever late yesterday despite aggressive left foot debridement on Tuesday. Mr. 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     Current Facility-Administered Medications: 0.9 % sodium chloride infusion, , IntraVENous, PRN  oxyCODONE-acetaminophen (PERCOCET) 5-325 MG per tablet 1 tablet, 1 tablet, Oral, Q4H PRN **OR** oxyCODONE-acetaminophen (PERCOCET) 5-325 MG per tablet 2 tablet, 2 tablet, Oral, Q4H PRN  sodium chloride 0.9 % irrigation 1,000 mL, 1,000 mL, Irrigation, Continuous PRN  nafcillin 2,000 mg in dextrose 5 % 100 mL IVPB, 2,000 mg, IntraVENous, Q4H  fentaNYL (SUBLIMAZE) injection 50 mcg, 50 mcg, IntraVENous, Q1H PRN  insulin regular (HUMULIN R;NOVOLIN R) 100 Units in sodium chloride 0.9 % 100 mL infusion, 0.5 Units/hr, IntraVENous, Continuous  Venelex ointment, , Topical, BID  albumin human 25 % IV solution 12.5 g, 12.5 g, IntraVENous, PRN  heparin (porcine) injection 2,600 Units, 2,600 Units, IntraCATHeter, PRN  heparin (porcine) injection 5,000 Units, 5,000 Units, SubCUTAneous, 3 times per day  dexmedetomidine (PRECEDEX) 400 mcg in sodium chloride 0.9 % 100 mL infusion, 0.2-2 mcg/kg/hr, IntraVENous, Continuous  midazolam (VERSED) injection 2 mg, 2 mg, IntraVENous, Q1H PRN  famotidine (PEPCID) injection 20 mg, 20 mg, IntraVENous, Daily  sodium chloride flush 0.9 % injection 5-40 mL, 5-40 mL, IntraVENous, 2 times per day  sodium chloride flush 0.9 % injection 5-40 mL, 5-40 mL, IntraVENous, PRN  0.9 % sodium chloride infusion, 25 mL, IntraVENous, PRN  acetaminophen (TYLENOL) tablet 650 mg, 650 mg, Oral, Q6H PRN **OR** acetaminophen (TYLENOL) suppository 650 mg, 650 mg, Rectal, Q6H PRN  glucose (GLUTOSE) 40 % oral gel 15 g, 15 g, Oral, PRN  glucagon (rDNA) injection 1 mg, 1 mg, IntraMUSCular, PRN  dextrose 5 % solution, 100 mL/hr, IntraVENous, PRN  dextrose bolus (hypoglycemia) 10% 125 mL, 125 mL, IntraVENous, PRN **OR** dextrose bolus (hypoglycemia) 10% 250 mL, 250 mL, IntraVENous, PRN  chlorhexidine (PERIDEX) 0.12 % solution 15 mL, 15 mL, Mouth/Throat, BID     This is day 3 of nafcillin, day 12 of MSSA therapy, POD 2 for the foot. Allergies: Januvia [sitagliptin], Metformin and related, Vancomycin, and Mustard oil [allyl isothiocyanate]    Pertinent items from the review of systems are discussed in the HPI; the remainder of the ROS was reviewed and is negative.      Objective:     Vital signs over the last 24 hours:  Temp Av.9 °F (38.3 °C)  Min: 100.1 °F (37.8 °C)  Max: 102 °F (38.9 °C)  Pulse  Av.2  Min: 58  Max: 94  Systolic (50PVW), DOY:429 , Min:109 , NNI:456   Diastolic (80THU), GID:88, Min:61, Max:107  Resp  Av.1  Min: 23  Max: 48  SpO2  Av.2 %  Min: 93 %  Max: 100 %    Constitutional:  well-developed, well-nourished, sedated on the vent but arousable to exam today  Psychiatric:  not otherwise able to assess  Eyes:  pupils equal, round, reactive to light; sclerae anicteric, conjunctivae not pale  ENT:  atraumatic; no labial ulcers; orally intubated  Resp:  lungs I think clearer to auscultation BL than last week, decreased at the bases  Cardiovascular:  heart regular, not tachy now, decreased heart sounds, no murmur; generally mild extremity edema; no IV phlebitis; right IJ CVC in place, and a right femoral CRRT line, a couple of peripherals  GI:  abdomen soft, NT, distended, hypoactive bowel sounds, no palpable masses or organomegaly; rectal tube now in place with less diarrhea  : Munoz in place, small amount of brownish urine now  Musculoskeletal:  no clubbing, cyanosis or petechiae; extremities with no gross effusions or acute arthritis  Skin: warm, dry, normal turgor; no acute rash, no peripheral stigmata of endocarditis. Left foot not examined today (VAC in place).  Left hand with the burn wound overall doing better, small amount of residual eschar, more granulation in the rest of it, some fibrin and biofilm, but two areas of exposed necrotic tendon sheath now, with at least a moderate amount of pus expressible by manual pressure from the wrist down along the dorsum of the hand.  ______________________________    Recent Labs     22  0433 22  0500 22  0400   WBC 17.8* 21.6* 32.0*   HGB 6.6* 7.5* 8.1*   HCT 20.2* 22.7* 24.4*   MCV 93.0 92.0 91.7    234 215     Lab Results   Component Value Date    CREATININE 5.3 () 2022     Lab Results   Component Value Date    LABALBU 2.4 (L) 02/03/2022     Lab Results   Component Value Date    ALT <5 (L) 02/03/2022    AST 22 02/03/2022     (H) 01/31/2022    ALKPHOS 491 (H) 02/03/2022    BILITOT 2.6 (H) 02/03/2022      Lab Results   Component Value Date    LABA1C 10.6 01/23/2022     Other recent pertinent labs:  Glucoses mostly in the 100s. Anion gap 15. MCV 93. ANC down to 14.1k, mild monocytosis. ______________________________    Recent pertinent micro results:  Recent BCx negative. Left hand WCx with both Staph aureus and Enterococcus faecalis (ABx (S) not done on that, but the vast majority here are amp- and vanc-(S)). Left foot bone Cx (+) for MSSA as well.  ______________________________    Recent imaging results (last 7 days):     CT HEAD WO CONTRAST    Result Date: 1/28/2022  1. No acute intracranial abnormality. XR CHEST PORTABLE    Result Date: 2/3/2022  1. No significant change. XR CHEST PORTABLE    Result Date: 2/2/2022  Increasing airspace opacification in the right lower lung zone that may represent underlying pneumonitis. Asymmetric edema may also be considered in this intubated patient. XR CHEST PORTABLE    Result Date: 2/1/2022  No significant interval change. XR CHEST PORTABLE    Result Date: 1/31/2022  Cardiomegaly with left basilar effusion and bibasilar infiltrates. Stable support tubes. XR CHEST PORTABLE    Result Date: 1/30/2022  1. Stable lines, tubes, and support devices. 2. Bilateral airspace opacities with pleural effusions, left greater than right. 3. Cardiomegaly. XR CHEST PORTABLE    Result Date: 1/29/2022  1. Stable lines, tubes, and support devices. 2. Stable cardiopulmonary status after accounting for differences in patient positioning including bilateral airspace opacities. 3. Cardiomegaly. 4. Low lung volumes. XR CHEST PORTABLE    Result Date: 1/28/2022  CHF with increasing pulmonary edema.      MRI FOOT LEFT WO CONTRAST    Result Date: 1/31/2022  1. Diffuse subcutaneous edema with organized complex collection along the dorsal soft tissues of the foot which communicates with large midfoot effusion. The dorsal collection measures 2.0 x 4.0 x 4.1 cm. Findings highly suspicious for abscess and septic joint given patient history. 2. Marrow signal changes throughout the midfoot and extending along the 2nd through 5th metatarsals which are most suggestive of osteomyelitis in the setting of soft tissue infection. Underlying Charcot arthropathy also present. Assessment:     Patient Active Problem List   Diagnosis Code    Hypertension I10    Uncontrolled type 2 diabetes mellitus with diabetic polyneuropathy ([facility] E11.42, E11.65    Cardiomyopathy ([address] I42.9    Mixed hyperlipidemia E78.2    Allergic rhinitis J30.9    Osteoarthritis M19.90    Acute osteomyelitis of left foot ([address] M86.172    Acute renal failure (HCC) N17.9    MSSA bacteremia R78.81, B95.61    Leukocytosis D72.829    Third degree burn of left hand T23.302A    Acute hypoxemic respiratory failure (HCC) J96.01    Cardiopulmonary arrest (HCC) I46.9    Abscess of left foot L02.612    Hypertriglyceridemia E78.1    Staphylococcal arthritis of left foot (HCC) M00.072    Antibiotic-associated diarrhea K52.1, T36.95XA    Acute encephalopathy G93.40    Second degree burn of multiple fingers of left hand excluding thumb T23.232A     Assessment of today's active condition(s):      --          Background of uncontrolled DM2, neuropathy, no known PAD, multiple prior diabetic foot ulcers, infections, surgeries. Most recent wounds were at the left 1st and 2nd ray, but they had been healed for just about a year.      --          Admission this time with a fairly nonfocal sepsis syndrome, at least in terms of symptoms, complicated by shock, acute renal failure, lactic acidosis, then cardiac arrest, resuscitation, acute respiratory failure, rapid Afib.  Currently with vent support, sedation, insulin drip, and on intermittent HD. Found to have MSSA bacteremia, and a sizeable MSSA foot abscess, septic midfoot arthritis and acute osteo of at least a couple of midfoot bones / metatarsal bases.     --          Ongoing hypoxemic respiratory failure, I think at least in part due to CHF / fluid overload after cardiac arrest. Perhaps ARF starting to turn around, in terms of a slight increase in urine output the last couple of days, though not today.     --          Third degree burn of left hand, now with purulence beneath the lysing eschar seen to be running along extensor tendons - with that degree of retained purulence, that COULD explain the fever in the last day or two, even after the foot was drained and debrided. Uncertain how pathogenic that Enterococcus is, but with ongoing fever, I think we do need to treat it now.      --          Ongoing SIRS as mentioned, I think from the left hand and left foot. No recent evidence of VAP, new bacteremia, UTI, Cdiff. Treatment recs:     Change Abx to Unasyn, 3gm IV q12 while on intermittent HD, to cover both the MSSA infection and the Enterococcus. Orthopedics evaluation of left hand, question of taking him to the OR for incision, drainage, debridement. VAC Veraflo per Dr. Quyen Pinto and Freda Romero. Watch for Candidiasis, line infection, Cdiff, etc.     If another fever in the next couple of days, especially with worse hemodynamics or despite drainage of the left hand (if that does happen), would restart a new ICU fever workup (BCx, urine, sputum, CXR, etc). D/W Dr. Dara Morales.     Electronically signed by Elmer Austin MD on 2/3/2022 at 9:31 AM.

## 2022-02-03 NOTE — CONSULTS
Pt seen for vac dressing check. VAC with good seal, dressing intact. Ortho consult for left hand, xray done. Antibx change per Dr Pepe Kaiser. VAC dressing change tomorrow.

## 2022-02-03 NOTE — PROGRESS NOTES
Shift assessment was completed (see flow sheet). Pt is Sedated- opens eyes to voice, not following commands at this time. Pt currently on ventilator- 7.5 ETT, at [address]. Spontaneous breathing trial to follow while on Precedex. 5/5    FiO2 at 50, PEEP 5, SpO2 at 98%, Respirations are Sara/ Equal,  with Diminished sounds. Infusing:  Precedex, Blood, Insulin (see MAR). Oral Gastric tube with tube feed at 40, 0mL residual observed and returned. IV access- RIJ/ Vascath/ Periperals and WDL. Munoz in place with STAT lock, Draining Brown/ Cloudy urine. Scheduled medications to follow. Call light within reach. Bed in lowest position. Bed alarm on. Family To be to bedside. Will continue to monitor    Patient is not able to demonstrated the ability to move from a reclining position to an upright position within the recliner. Patient is confused, demented and /or unable to follow instruction. After first 60 mins of blood administration, pt shows no signs of adverse reaction. Continuing to monitor.

## 2022-02-03 NOTE — PROGRESS NOTES
02/02/22 2322   Vent Information   Vent Type 980   Vent Mode AC/VC   Vt Ordered 480 mL   Rate Set 22 bmp   Peak Flow 70 L/min   FiO2  45 %   SpO2 100 %   SpO2/FiO2 ratio 222.22   Sensitivity 3   PEEP/CPAP 5   Humidification Source Heated wire   Humidification Temp 37   Circuit Condensation Drained   Vent Patient Data   Peak Inspiratory Pressure 25 cmH2O   Mean Airway Pressure 11 cmH20   Rate Measured 25 br/min   Vt Exhaled 481 mL   Minute Volume 11.8 Liters   I:E Ratio 1:2.1   Plateau Pressure 22 [id number]   Cough/Sputum   Sputum How Obtained Endotracheal;Suctioned   Cough Productive   Sputum Amount Moderate   Sputum Color Creamy; Tan   Tenacity Thick   Spontaneous Breathing Trial (SBT) RT Doc   Pulse 62   Breath Sounds   Right Upper Lobe Diminished   Right Middle Lobe Diminished   Right Lower Lobe Diminished   Left Upper Lobe Diminished   Left Lower Lobe Diminished   Additional Respiratory  Assessments   Resp 25   Position Semi-Springer's   Alarm Settings   High Pressure Alarm 45 cmH2O   Low Minute Volume Alarm 3 L/min   High Respiratory Rate 50 br/min   Low Exhaled Vt  300 mL   Patient Observation   Observations ETT SIZE 7.5, SECURED AT 24 LIP LINE. AMBU BAG AT HEAD OF BED. WATER GOOD.     ETT (adult)   Placement Date: 01/23/22   Preoxygenation: Yes  Technique: Direct laryngoscopy  Type: Cuffed  Tube Size: 7.5 mm  Insertion attempts: 1  Secured at: 24 cm  Measured From: Lips   Secured at 24 cm   Measured From [address] By [employer] tube wheeler   Site Condition Dry

## 2022-02-03 NOTE — PROGRESS NOTES
Reassessment completed (see Flowsheet). All ICU lines remain intact, ICU monitoring continued-   Infusing:  PRECEDEX, INSULIN (SEE MAR). pt remains on Spontaneous  Now 8/5. Donzell Pyo Lung sounds Diminished. pt's blood pressures WDL  Spouse at bedside and updated. Continuing to monitor.

## 2022-02-03 NOTE — PROGRESS NOTES
Reassessment completed (see Flowsheet). All ICU lines remain intact, ICU monitoring continued-   Infusing:  Precedex, insulin (see MAR)  pt remains in spontaneous. 8/5. Lung sounds Rhonchi on R. Diminished. pt's blood pressures WDL  Spouse at bedside. .    Continuing to monitor.

## 2022-02-03 NOTE — PROGRESS NOTES
02/03/22 0302   Vent Information   Vent Type 980   Vent Mode AC/VC   Vt Ordered 480 mL   Rate Set 22 bmp   Peak Flow 70 L/min   FiO2  40 %   SpO2 99 %   SpO2/FiO2 ratio 247.5   Sensitivity 3   PEEP/CPAP 5   Humidification Source Heated wire   Humidification Temp 37   Humidification Temp Measured 37   Circuit Condensation Drained   Vent Patient Data   Peak Inspiratory Pressure 20 cmH2O   Mean Airway Pressure 11 cmH20   Rate Measured 30 br/min   Vt Exhaled 493 mL   Minute Volume 15.3 Liters   I:E Ratio 1:1.8   Plateau Pressure 21 [id number]   Cough/Sputum   Sputum How Obtained Endotracheal;Suctioned   Cough Productive   Sputum Amount Moderate   Sputum Color Creamy; Tan   Tenacity Thick   Spontaneous Breathing Trial (SBT) RT Doc   Pulse 69   Breath Sounds   Right Upper Lobe Diminished   Right Middle Lobe Diminished   Right Lower Lobe Diminished   Left Upper Lobe Diminished   Left Lower Lobe Diminished   Additional Respiratory  Assessments   Resp 27   Position Semi-Springer's   Alarm Settings   High Pressure Alarm 45 cmH2O   Low Minute Volume Alarm 3 L/min   High Respiratory Rate 50 br/min   Low Exhaled Vt  300 mL   Patient Observation   Observations ETT SIZE 7.5, SECURED AT 24 LIP LINE. AMBU BG AT HEAD OF BED. WATER GOOD.     ETT (adult)   Placement Date: 01/23/22   Preoxygenation: Yes  Technique: Direct laryngoscopy  Type: Cuffed  Tube Size: 7.5 mm  Insertion attempts: 1  Secured at: 24 cm  Measured From: Lips   Secured at 24 cm   Measured From Lips   ET Placement Left   Secured By [employer] tube wheeler   Site Condition Dry

## 2022-02-03 NOTE — PROGRESS NOTES
Pulmonary & Critical Care Medicine ICU Progress Note    CC: Septic shock, MSSA bacteremia, left foot abscess    Events of Last 24 hours:    Agitation on ventilator  Ultrafiltration  Recurrent fevers  1 unit PRBC  SBT yesterday     Vascular lines:    Right IJ 1/23  Right femoral Vas-Cath 1/23    MV: 1/23/22  Vent Mode: AC/VC Rate Set: 22 bmp/Vt Ordered: 480 mL/ /FiO2 : 40 %  Recent Labs     02/02/22  0500 02/03/22  0435   PHART 7.429 7.431   IGJ3OCB 26.5* 27.3*   PO2ART 71.3* 63.3*       IV:   sodium chloride      sodium chloride      insulin 9.8 Units/hr (02/03/22 0609)    dexmedetomidine HCl in NaCl 2 mcg/kg/hr (02/03/22 0604)    sodium chloride 5 mL/hr at 02/01/22 1041    dextrose         Vitals:  Blood pressure 124/62, pulse 70, temperature 101.7 °F (38.7 °C), temperature source Bladder, resp. rate 28, height 6' 1\" (1.854 m), weight 289 lb 11 oz (131.4 kg), SpO2 95 %. on vent    Intake/Output Summary (Last 24 hours) at 2/3/2022 0630  Last data filed at 2/3/2022 0400  Gross per 24 hour   Intake 3251.26 ml   Output 2542 ml   Net 709.26 ml     EXAM:  General: intubated, ill appearing    ENT:   Pharynx with ETT. Neck: Trachea midline  Resp: No accessory muscle use. CV: Regular rate. Regular rhythm. GI:  Non-distended.     Neuro: OE and tracks but not FC   Skin: left hand with open wound by report, now bandaged   Psych: Unable to obtain because the patient is non-communicative       Scheduled Meds:   nafcillin  2,000 mg IntraVENous Q4H    Venelex   Topical BID    heparin (porcine)  5,000 Units SubCUTAneous 3 times per day    famotidine (PEPCID) injection  20 mg IntraVENous Daily    sodium chloride flush  5-40 mL IntraVENous 2 times per day    chlorhexidine  15 mL Mouth/Throat BID       Data:  CBC:   Recent Labs     02/01/22  0400 02/02/22  0500 02/03/22  0433   WBC 32.0* 21.6* 17.8*   HGB 8.1* 7.5* 6.6*   HCT 24.4* 22.7* 20.2*   MCV 91.7 92.0 93.0    234 263     BMP:   Recent Labs 02/01/22  0515 02/02/22  0500 02/03/22  0433   * 131* 132*   K 5.6* 5.0 4.9   CL 97* 97* 98*   CO2 17* 16* 19*   PHOS 9.2* 8.2* 8.7*   * 91* 78*   CREATININE 6.1* 5.5* 5.3*     LIVER PROFILE:   Recent Labs     02/01/22  0515 02/02/22  0500 02/03/22  0433   AST 63* 56* 22   ALT 6* 7* <5*   BILIDIR 0.4* 1.1* 2.3*   BILITOT 0.6 1.4* 2.6*   ALKPHOS 437* 576* 491*       Microbiology:  1/22 [facility] MSSA  1/22 COVID-19 and influenza negative  1/23/22 Blood cx: NGTD  1/23 tracheal aspirate MSSA  1/24 Wound cx: MSSA  1/24 BC MSSA  1/29/22 BAL pending  1/30/2022 blood sent  1/31/2022 left hand Enterococcus and staph aureus  2/1/2022 bone MSSA    Echo 1/25/22: EF 35%, global HK, reduced RV function    CXR 2/2/22   Increasing airspace opacification in the right lower lung zone that may   represent underlying pneumonitis.  Asymmetric edema may also be considered in   this intubated patient. MRI Left foot 2/1/22   Impression   1. Diffuse subcutaneous edema with organized complex collection along the   dorsal soft tissues of the foot which communicates with large midfoot   effusion.  The dorsal collection measures 2.0 x 4.0 x 4.1 cm.  Findings   highly suspicious for abscess and septic joint given patient history. 2. Marrow signal changes throughout the midfoot and extending along the 2nd   through 5th metatarsals which are most suggestive of osteomyelitis in the   setting of soft tissue infection.  Underlying Charcot arthropathy also   present. 1/31/22 EEG:This EEG is abnormal. The generalized diffuse slowing is suggestive of severe diffuse encephalopathy. There is no evidence of epileptiform discharges, focal, or lateralizing abnormalities.       ASSESSMENT:  · Acute hypoxemic respiratory failure   · Septic shock    · Acute encephalopathy, probable anoxic encephalopathy   · MSSA bacteremia  · Hypertriglyceridemia   · L foot abscess drained 1/24/2022, MSSA; MRI with large fluid collection and changes c/w osteomyelitis   · Cardiopulmonary arrest x 2 1/23/2022, required CPR, TTM - rewarmed 8 pm 1/25/22  · Paroxysmal AFIB   · Cardiomyopathy EF 35% on Echo 1/24/22  · Acute pulmonary edema/fluid overload  · Acute kidney failure  · DM with hyperglycemia  · Left hand burn  · History of cardiomyopathy - last echo 2014 EF 50%     PLAN:  PSV as tolerated  Mechanical ventilation as per my orders. The ventilator was adjusted by me at the bedside for unstable, life threatening respiratory failure. · Precedex and PRN sedatives only for ventilator tolerance  · IV unasyn per infectious disease, I d/w Dr. Catracho Raman today   · MRI per ortho  · Nephrology following for HD  · TF  · Insulin gtt   · Prophylaxis: SQ heparin, Pepcid    Total critical care time caring for this patient with life threatening, unstable organ failure, including direct patient contact, management of life support systems, review of data including imaging and labs, discussions with other team members and physicians is 31 minutes so far today, excluding procedures.

## 2022-02-03 NOTE — PROGRESS NOTES
Pt remains on Ps 8/5, FiO2 45%         02/03/22 1147   Vent Information   Vt Ordered 0 mL   Rate Set 0 bmp   Peak Flow 0 L/min   Pressure Support 8 cmH20   FiO2  45 %   SpO2 98 %   SpO2/FiO2 ratio 217.78   Sensitivity 3   PEEP/CPAP 5   I Time/ I Time % 0 s   Humidification Source Heated wire   Humidification Temp Measured 37   Vent Patient Data   High Peep/I Pressure 0   Peak Inspiratory Pressure 14 cmH2O   Mean Airway Pressure 8.5 cmH20   Rate Measured 27 br/min   Vt Exhaled 455 mL   Minute Volume 13.5 Liters   I:E Ratio 1:1.90   Cough/Sputum   Sputum How Obtained None   Spontaneous Breathing Trial (SBT) RT Doc   Pulse 71   Breath Sounds   Right Upper Lobe Clear;Diminished   Right Middle Lobe Diminished   Right Lower Lobe Diminished   Left Upper Lobe Clear;Diminished   Left Lower Lobe Diminished   Additional Respiratory  Assessments   Resp 29   Position Semi-Springer's   Oral Care Completed? Yes   Oral Care Mouth suctioned   Subglottic Suction Done?  Yes   Cuff Pressure (cm H2O) 27 cm H2O   Alarm Settings   High Pressure Alarm 45 cmH2O   Low Minute Volume Alarm 3 L/min   High Respiratory Rate 50 br/min   ETT (adult)   Placement Date: 01/23/22   Preoxygenation: Yes  Technique: Direct laryngoscopy  Type: Cuffed  Tube Size: 7.5 mm  Insertion attempts: 1  Secured at: 24 cm  Measured From: Lips   Secured at 24 cm   Measured From [address] [employer] tube wheeler   Site Condition Dry

## 2022-02-03 NOTE — CONSULTS
Department of Orthopedic Surgery  Physician Assistant   Consult Note        Reason for Consult:  Left hand wound  Requesting Physician: Kimberly Bowling, *  Date of Service: 2/3/2022 11:08 AM    CHIEF COMPLAINT:  As Above    History Obtained From:  spouse, electronic medical record    HISTORY OF PRESENT ILLNESS:                The patient is a 42 y.o. male who presents with above chief complaint. 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 He has neuropathy in the hand and was unable to feel it burning the skin. When he removed his glove he had a large burn on the ulnar aspect of the hand. Did not seek care and has not had any treatment for this since the injury. He is currently here after being admitted following a syncopal episode and renal failure with subsequent cardiac arrest x2. He is intubated currently and per notes not responsive but opening eyes. Wound care team is managing the wound currently.     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    Chronic osteomyelitis of left foot ([address] 10/27/2020    Closed displaced fracture of distal phalanx of right little finger 4/8/2021    Clostridium difficile infection 11/01/2016    Diabetic ulcer of left forefoot associated with type 2 diabetes mellitus, with necrosis of bone ([address] 8/1/2019    Diabetic ulcer of right great toe associated with type 2 diabetes mellitus, with necrosis of bone ([address] 08/2019    Failed soft 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 toe amputation site 10/26/2020    History of hyperbaric oxygen therapy 10/28/2020    [facility]- [facility] - compromised flap    Migraine     Possible perforated tympanic membrane     as a result of recurrent otitis    Post-op hematoma of left foot 11/12/2020    Recurrent otitis media     Septic shock ([address]     Syncope     Tear of medial meniscus of left knee     Tobacco use     Toe osteomyelitis, left ([address] 7/24/2020     Past Surgical History:        Procedure Laterality Date    FOOT DEBRIDEMENT Left 2/1/2022    ULCER DEBRIDEMENT LEFT FOOT performed by Jacob Mckeon DPM at [address] [facility] ARTHROSCOPY Left [id number]    LEFT KNEE ARTHROSCOPY , SYNOVECTOMY       OTHER SURGICAL HISTORY Left 07/24/2020    PARTIAL LEFT FOOT AMPUTATION    TOE AMPUTATION Right 8/23/2019    PARTIAL RIGHT FOOT AMPUTATION performed by Jacob Mckeon DPM at [address] [address] Left 7/24/2020    PARTIAL LEFT FOOT AMPUTATION performed by Jacob Mckeon DPM at [address] [facility] [address] TOE AMPUTATION Left 10/22/2020    PARTIAL LEFT FOOT AMPUTATION WITH GRAFT APPLICATION performed by Jacob Mckeon DPM at [address] [facility]           Medications Prior to Admission:   Prior to Admission medications    Medication Sig Start Date End Date Taking? Authorizing Provider   Insulin Degludec (TRESIBA FLEXTOUCH) 200 UNIT/ML SOPN INJECT 76 UNITS INTO THE SKIN NIGHTLY 1/17/22  Yes Sergio Maravilla MD   losartan (COZAAR) 50 MG tablet TAKE 1 TABLET BY MOUTH DAILY 1/13/22  Yes Sergio Maravilla MD   tiZANidine (ZANAFLEX) 4 MG tablet TAKE 2 TABLETS BY MOUTH NIGHTLY 1/4/22  Yes Sergio Maravilla MD   carvedilol (COREG) 12.5 MG tablet TAKE 1 TABLET BY MOUTH 2 TIMES DAILY (WITH MEALS) 12/30/21  Yes Sergio Maravilla MD   rosuvastatin (CRESTOR) 20 MG tablet TAKE 1 TABLET BY MOUTH NIGHTLY 12/30/21  Yes Crystal Zepeda MD   traZODone (DESYREL) 50 MG tablet TAKE 1 TABLET BY MOUTH NIGHTLY 12/7/21  Yes Sergio Maravilla MD   furosemide (LASIX) 20 MG tablet TAKE 1 TABLET BY MOUTH DAILY 12/7/21  Yes Sergio Maravilla MD   gabapentin (NEURONTIN) 400 MG capsule TAKE 2 CAPSULES BY MOUTH 3 TIMES DAILY FOR 90 DAYS.  11/30/21 2/28/22 Yes Sergio Maravilla MD   insulin lispro (HUMALOG KWIKPEN) 200 UNIT/ML SOPN pen Inject 25 Units into the skin 3 times daily (before meals) 10/14/21  Yes Yancy Rivera MD   glimepiride (AMARYL) 4 MG tablet TAKE 1 TABLET BY MOUTH EVERY MORNING (BEFORE BREAKFAST). 9/10/21  Yes Yancy Rivera MD   PARoxetine (PAXIL) 10 MG tablet TAKE 1 TABLET BY MOUTH EVERY MORNING 9/10/21  Yes Yancy Rivera MD   blood glucose test strips (ONETOUCH ULTRA) strip USE TO TEST BLOOD GLUCOSE DAILY. DX:E11.9 3/5/21  Yes Yancy Rivera MD   Blood Glucose Monitoring Suppl (CONTOUR NEXT EZ) w/Device KIT Use to test glucose daily. DX:E11.9 12/10/20  Yes Yancy Rivera MD   Insulin Pen Needle 32G X 6 MM MISC Use with insulin daily . DX:E11.9 12/4/20  Yes Yancy Rivera MD   blood glucose monitor strips Use to test blood sugar daily. DX:E11.9 12/4/20  Yes Yancy Rivera MD   oxymetazoline (AFRIN) 0.05 % nasal spray 2 sprays by Nasal route daily Indications: prior to HBO   Yes Historical Provider, MD   loratadine (CLARITIN) 10 MG tablet Take 10 mg by mouth nightly as needed    Yes Historical Provider, MD   sildenafil (VIAGRA) 100 MG tablet TAKE 1 TABLET BY MOUTH AS NEEDED FOR ERECTILE DYSFUNCTION 8/27/19  Yes Yancy Rivera MD       Allergies:  Januvia [sitagliptin], Metformin and related, Vancomycin, and Mustard oil [allyl isothiocyanate]    Social History:    Tobacco:  reports that he quit smoking about 16 years ago. He has a 1.00 pack-year smoking history. He quit smokeless tobacco use about 16 years ago. Alcohol:  reports current alcohol use.    Illicit Drug: No  Family History:       Problem Relation Age of Onset    Diabetes Mother     High Blood Pressure Mother     High Cholesterol Mother     Diabetes Father     High Blood Pressure Father     High Cholesterol Father     Stroke Father     High Blood Pressure Sister     High Blood Pressure Maternal Uncle     High Cholesterol Maternal Uncle     High Blood Pressure Maternal Grandmother        REVIEW OF SYSTEMS: CONSTITUTIONAL:  negative  MUSCULOSKELETAL:  positive for  pain  All other systems reviewed and negative    PHYSICAL EXAM:    awake, alert, cooperative, no apparent distress, and appears stated age  MUSCULOSKELETAL:  Left hand: moderate size wound lateral aspect of dorsal hand without extension to the palmar aspect. Appears to be some exposed tendon on 4,5 proximal fingers. No active drainage. Some dorsal hand swelling. Good radial pulse. No erythema proximal forearm. DATA:    CBC:   Recent Labs     02/01/22  0400 02/02/22  0500 02/03/22  0433   WBC 32.0* 21.6* 17.8*   HGB 8.1* 7.5* 6.6*    234 263     BMP:    Recent Labs     02/01/22  0515 02/02/22  0500 02/03/22  0433   * 131* 132*   K 5.6* 5.0 4.9   CL 97* 97* 98*   CO2 17* 16* 19*   * 91* 78*   CREATININE 6.1* 5.5* 5.3*   GLUCOSE 152* 222* 159*     INR: No results for input(s): INR in the last 72 hours. Radiology:   XR HAND LEFT (MIN 3 VIEWS)   Final Result   No radiographic evidence of osteomyelitis with technical limitations as above. XR CHEST PORTABLE   Final Result   1. No significant change. XR CHEST PORTABLE   Final Result   Increasing airspace opacification in the right lower lung zone that may   represent underlying pneumonitis. Asymmetric edema may also be considered in   this intubated patient. XR CHEST PORTABLE   Final Result   No significant interval change. MRI FOOT LEFT WO CONTRAST   Final Result   1. Diffuse subcutaneous edema with organized complex collection along the   dorsal soft tissues of the foot which communicates with large midfoot   effusion. The dorsal collection measures 2.0 x 4.0 x 4.1 cm. Findings   highly suspicious for abscess and septic joint given patient history. 2. Marrow signal changes throughout the midfoot and extending along the 2nd   through 5th metatarsals which are most suggestive of osteomyelitis in the   setting of soft tissue infection. Underlying Charcot arthropathy also   present. XR CHEST PORTABLE   Final Result   Cardiomegaly with left basilar effusion and bibasilar infiltrates. Stable support tubes. XR CHEST PORTABLE   Final Result   1. Stable lines, tubes, and support devices. 2. Bilateral airspace opacities with pleural effusions, left greater than   right. 3. Cardiomegaly. XR CHEST PORTABLE   Final Result   1. Stable lines, tubes, and support devices. 2. Stable cardiopulmonary status after accounting for differences in patient   positioning including bilateral airspace opacities. 3. Cardiomegaly. 4. Low lung volumes. CT HEAD WO CONTRAST   Final Result   1. No acute intracranial abnormality. XR CHEST PORTABLE   Final Result   CHF with increasing pulmonary edema. XR CHEST PORTABLE   Final Result   Patchy airspace disease, left greater than right which may represent   atelectasis or pneumonia. XR CHEST PORTABLE   Final Result   Stable support apparatus. Increasing bilateral airspace opacities which may be related to edema and/or   pneumonia. XR CHEST PORTABLE   Final Result   Low lung volumes/poor inspiratory effort limiting the study. No significant improvement. A mild cardiomegaly. Mild congestion and/or   infiltrates identified in the lungs. No pneumothorax. XR FOOT LEFT (2 VIEWS)   Final Result   1. Remote history of amputation at the 1st and 2nd digits. No evidence of   osteomyelitis at prior resection site. 2. Subtle erosions at the 3rd MTP joint are new. This is adjacent to soft   tissue swelling at the prior amputation site. A new focus of osteomyelitis   is suspected. 3. Soft tissue swelling and questionable subcutaneous gas along the lateral   aspect of the left foot. There is also severe disorganization of bone at the   2nd through 5th tarsometatarsal joints.   Although pattern may represent   Charcot arthropathy due to the more diffuse appearance, areas of   osteomyelitis cannot be excluded with plain film. Correlate with physical   exam and clinical workup. A follow-up MRI may be helpful for further   evaluation. XR CHEST PORTABLE   Final Result   Low lung volumes with cardiomegaly and vascular congestion, as well as patchy   airspace disease bilaterally, similar to the previous exam.         XR CHEST PORTABLE   Final Result   Status post advancement of right internal jugular central line with distal   tip now visualized near region of junction of superior vena cava and right   atrium. No evidence of pneumothorax. XR CHEST PORTABLE   Final Result   Improved lung volumes. Bilateral perihilar opacification, edema versus   infiltrate. Satisfactory position of endotracheal tube. Central line tip in either the   distal brachiocephalic vein or proximal SVC. XR CHEST PORTABLE   Final Result   Cardiomegaly with left mid and lower lung infiltrates. Support tubes as described above. XR CHEST 1 VIEW   Final Result   Limited chest as outlined above. Bilateral perihilar opacification, edema   versus infiltrate. XR CHEST PORTABLE   Final Result   Low lung volumes. No acute cardiopulmonary disease. XR CHEST PORTABLE    (Results Pending)          IMPRESSION/RECOMMENDATIONS:    Assessment: left hand dorsal hand burn    Plan:  1) Would recommend MRI of the left hand to evaluate for any deep abscess. Continue local wound care. Will likely need plastics assistance down the road for formal debridement and skin graft given likely tendon exposure. No surgical plans for today. Thank you for the opportunity to consult on this patient.     Najma Pettit

## 2022-02-03 NOTE — PROGRESS NOTES
Shift handoff report given to Nader Varela RN at bedside. Pt is on vent/ Awake. IV handoff completed. 4 eyes completed with wound care. Call light within reach, bed in lowest position, bed alarm on. End of shift checks completed. Pt has been free of falls for duration of shift. Cedric Alba

## 2022-02-04 NOTE — PROGRESS NOTES
02/04/22 0245   Vent Information   Vent Type 980   Vent Mode AC/VC   Vt Ordered 480 mL   Rate Set 22 bmp   Peak Flow 70 L/min   Pressure Support 0 cmH20   FiO2  40 %   SpO2 93 %   SpO2/FiO2 ratio 232.5   Sensitivity 3   PEEP/CPAP 5   I Time/ I Time % 0 s   Humidification Source Heated wire   Humidification Temp 37   Humidification Temp Measured 37   Circuit Condensation Drained   Vent Patient Data   High Peep/I Pressure 0   Peak Inspiratory Pressure 19 cmH2O   Mean Airway Pressure 12 cmH20   Rate Measured 31 br/min   Vt Exhaled 480 mL   Minute Volume 15.4 Liters   I:E Ratio 1:1.50   Plateau Pressure 22 FAO11   Cough/Sputum   Sputum How Obtained Endotracheal;Suctioned   Cough Productive   Sputum Amount Small   Sputum Color Creamy   Tenacity Thick   Spontaneous Breathing Trial (SBT) RT Doc   Pulse 88   Breath Sounds   Right Upper Lobe Diminished   Right Middle Lobe Diminished   Right Lower Lobe Diminished   Left Upper Lobe Diminished   Left Lower Lobe Diminished   Additional Respiratory  Assessments   Resp (!) 33   Position Semi-Springer's   Alarm Settings   High Pressure Alarm 45 cmH2O   Low Minute Volume Alarm 3 L/min   High Respiratory Rate 50 br/min   Patient Observation   Observations ETT SIZE 7.5, SECURED AT 24 LIP LINE. AMBU BAG AT HEAD OF BED.  Deal.     ETT (adult)   Placement Date: 01/23/22   Preoxygenation: Yes  Technique: Direct laryngoscopy  Type: Cuffed  Tube Size: 7.5 mm  Insertion attempts: 1  Secured at: 24 cm  Measured From: Lips   Secured at 24 cm   Measured From Lips   ET Placement Left   Secured By Commercial tube wheeler   Site Condition Dry detailed exam negative

## 2022-02-04 NOTE — PROGRESS NOTES
Hospitalist Progress Note      PCP: Urmila Rojas MD    Date of Admission: 1/22/2022     Admitted with acute hypoxic respiratory failure, septic shock and MSSA bacteremia  Left foot abscess drained 1/24/2022  Cardiopulmonary arrest x2 on 1/23/2022, required CPR  Went with acute kidney injury currently on CRRT    Subjective: on CRRT, still on levophed  Off epinephrine  On insulin drip    1/27  Failed SBT    CRRT stopped-Lasix 100 mg IV x1 given  Off levophed     1/28  Not making urine despite receiving Lasix 100 mg IV  Increased tachypnea and hypoxia off sedation  On Precedex    1/29  Currently on hemodialysis  On Precedex only. Off all the sedation. Doing well on SBT, however not waking up. Fever     1/30  Currently only on Precedex. Not following any commands or having any purposeful movements. T-max 101.2    1/31- Spiked fevers over the weekend. On the ventilator. Not following commands. Mom at bedside. She said he has opened his eyes. 2/1- MRI of the left foot was done. It showed an abscess/septic joint and osteomyelitis. On Precedex. He went to the OR today. Ulcer debridement of left foot was done by Dr Eddie Harvey. 2/2-opens eyes to verbal commands. On the ventilator. Low-grade fever. Hemodialysis with 2 L of fluid removal.  Not making much urine. 2/3-opens eyes to verbal commands. Undergoing ultrafiltration. Has had recurrent fevers. Got 1 unit of packed red blood cells yesterday. 2/4-patient wanting more awake and alert. Was on pressure support yesterday. Able to follow simple commands but not consistently. Plans for hemodialysis today. Ortho evaluation done for left hand injury. ID still concerned about active infection in that area. They have reached out to Ortho.       Medications:  Reviewed    Infusion Medications    sodium chloride      sodium chloride      insulin 2.67 Units/hr (02/04/22 1204)    dexmedetomidine HCl in NaCl 2 mcg/kg/hr (02/04/22 1046)    sodium chloride Stopped (02/03/22 0656)    dextrose       Scheduled Medications    ampicillin-sulbactam  3,000 mg IntraVENous Q12H    Venelex   Topical BID    heparin (porcine)  5,000 Units SubCUTAneous 3 times per day    famotidine (PEPCID) injection  20 mg IntraVENous Daily    sodium chloride flush  5-40 mL IntraVENous 2 times per day    chlorhexidine  15 mL Mouth/Throat BID     PRN Meds: heparin (porcine), sodium chloride, oxyCODONE-acetaminophen **OR** oxyCODONE-acetaminophen, sodium chloride, fentanNYL, albumin human, heparin (porcine), midazolam, sodium chloride flush, sodium chloride, acetaminophen **OR** acetaminophen, glucose, glucagon (rDNA), dextrose, dextrose bolus (hypoglycemia) **OR** dextrose bolus (hypoglycemia)      Intake/Output Summary (Last 24 hours) at 2/4/2022 1221  Last data filed at 2/4/2022 1209  Gross per 24 hour   Intake 2110.49 ml   Output 30 ml   Net 2080.49 ml       Physical Exam Performed:    /78   Pulse 95   Temp 100.4 °F (38 °C) (Bladder)   Resp (!) 31   Ht 6' 1\" (1.854 m)   Wt 289 lb 11 oz (131.4 kg)   SpO2 100%   BMI 38.22 kg/m²       Gen: intubated. Ill appearing. Eyes open to verbal command. Eyes: PERRL. No sclera icterus. No conjunctival injection. ENT: No discharge. Pharynx clear. Neck: Trachea midline. Normal thyroid. Resp: No accessory muscle use. + crackles. No wheezes. No rhonchi. CV: Regular rate. Regular rhythm. No murmur or rub. No edema. GI: Non-tender. Non-distended. No masses. No organomegaly. Normal bowel sounds. No hernia. Skin: burns left hand with some yellow slough. Chronic skin changes both legs.   Lymph: No cervical LAD. No supraclavicular LAD. M/S: No cyanosis. No joint deformity. No clubbing. Missing right big toe, left foot is covered. Neuro: Opens eyes to verbal commands. Can blink eyes.     Labs:   Recent Labs     02/02/22  0500 02/02/22  0500 02/03/22  0433 02/03/22  1930 02/04/22  0423   WBC 21.6*  --  17.8*  --  19.4*   HGB 7.5*   < > 6.6* 7.4* 7.4*   HCT 22.7*   < > 20.2* 22.6* 22.4*     --  263  --  320    < > = values in this interval not displayed. Recent Labs     02/02/22  0500 02/03/22  0433 02/04/22  0423   * 132* 131*   K 5.0 4.9 5.7*   CL 97* 98* 97*   CO2 16* 19* 15*   BUN 91* 78* 97*   CREATININE 5.5* 5.3* 6.9*   CALCIUM 7.9* 8.2* 8.0*   PHOS 8.2* 8.7* 11.6*     Recent Labs     02/02/22  0500 02/03/22 0433 02/04/22 0423   AST 56* 22 14*   ALT 7* <5* <5*   BILIDIR 1.1* 2.3* 1.0*   BILITOT 1.4* 2.6* 1.2*   ALKPHOS 576* 491* 423*     No results for input(s): INR in the last 72 hours. No results for input(s): Heaven Castor in the last 72 hours. Results for Abad Mercado (MRN [id number]) as of 1/31/2022 23:27   Ref. Range 1/31/2022 04:20   GGT Latest Ref Range: 8 - 61 U/L 272 (H)   Results for Abad Mercado (MRN [id number]) as of 1/31/2022 23:27   Ref. Range 1/31/2022 04:20   Total CK Latest Ref Range: 39 - 308 U/L 676 (H)       Urinalysis:      Lab Results   Component Value Date    NITRU Negative 01/22/2022    WBCUA 0-2 01/22/2022    BACTERIA Rare 01/22/2022    RBCUA 0-2 01/22/2022    BLOODU TRACE-LYSED 01/22/2022    SPECGRAV >=1.030 01/22/2022    GLUCOSEU 500 01/22/2022       Radiology:  XR CHEST PORTABLE   Final Result   Multifocal opacities in the left lung likely with some left basilar pleural   effusion/atelectasis is again noted. The less prominent opacities in the   right lung are also stable. ET, NG, and right jugular line appear acceptable. XR HAND LEFT (MIN 3 VIEWS)   Final Result   No radiographic evidence of osteomyelitis with technical limitations as above. XR CHEST PORTABLE   Final Result   1. No significant change. XR CHEST PORTABLE   Final Result   Increasing airspace opacification in the right lower lung zone that may   represent underlying pneumonitis. Asymmetric edema may also be considered in   this intubated patient.          XR CHEST PORTABLE Final Result   No significant interval change. MRI FOOT LEFT WO CONTRAST   Final Result   1. Diffuse subcutaneous edema with organized complex collection along the   dorsal soft tissues of the foot which communicates with large midfoot   effusion. The dorsal collection measures 2.0 x 4.0 x 4.1 cm. Findings   highly suspicious for abscess and septic joint given patient history. 2. Marrow signal changes throughout the midfoot and extending along the 2nd   through 5th metatarsals which are most suggestive of osteomyelitis in the   setting of soft tissue infection. Underlying Charcot arthropathy also   present. XR CHEST PORTABLE   Final Result   Cardiomegaly with left basilar effusion and bibasilar infiltrates. Stable support tubes. XR CHEST PORTABLE   Final Result   1. Stable lines, tubes, and support devices. 2. Bilateral airspace opacities with pleural effusions, left greater than   right. 3. Cardiomegaly. XR CHEST PORTABLE   Final Result   1. Stable lines, tubes, and support devices. 2. Stable cardiopulmonary status after accounting for differences in patient   positioning including bilateral airspace opacities. 3. Cardiomegaly. 4. Low lung volumes. CT HEAD WO CONTRAST   Final Result   1. No acute intracranial abnormality. XR CHEST PORTABLE   Final Result   CHF with increasing pulmonary edema. XR CHEST PORTABLE   Final Result   Patchy airspace disease, left greater than right which may represent   atelectasis or pneumonia. XR CHEST PORTABLE   Final Result   Stable support apparatus. Increasing bilateral airspace opacities which may be related to edema and/or   pneumonia. XR CHEST PORTABLE   Final Result   Low lung volumes/poor inspiratory effort limiting the study. No significant improvement. A mild cardiomegaly. Mild congestion and/or   infiltrates identified in the lungs. No pneumothorax.          XR FOOT LEFT (2 VIEWS)   Final Result   1. Remote history of amputation at the 1st and 2nd digits. No evidence of   osteomyelitis at prior resection site. 2. Subtle erosions at the 3rd MTP joint are new. This is adjacent to soft   tissue swelling at the prior amputation site. A new focus of osteomyelitis   is suspected. 3. Soft tissue swelling and questionable subcutaneous gas along the lateral   aspect of the left foot. There is also severe disorganization of bone at the   2nd through 5th tarsometatarsal joints. Although pattern may represent   Charcot arthropathy due to the more diffuse appearance, areas of   osteomyelitis cannot be excluded with plain film. Correlate with physical   exam and clinical workup. A follow-up MRI may be helpful for further   evaluation. XR CHEST PORTABLE   Final Result   Low lung volumes with cardiomegaly and vascular congestion, as well as patchy   airspace disease bilaterally, similar to the previous exam.         XR CHEST PORTABLE   Final Result   Status post advancement of right internal jugular central line with distal   tip now visualized near region of junction of superior vena cava and right   atrium. No evidence of pneumothorax. XR CHEST PORTABLE   Final Result   Improved lung volumes. Bilateral perihilar opacification, edema versus   infiltrate. Satisfactory position of endotracheal tube. Central line tip in either the   distal brachiocephalic vein or proximal SVC. XR CHEST PORTABLE   Final Result   Cardiomegaly with left mid and lower lung infiltrates. Support tubes as described above. XR CHEST 1 VIEW   Final Result   Limited chest as outlined above. Bilateral perihilar opacification, edema   versus infiltrate. XR CHEST PORTABLE   Final Result   Low lung volumes. No acute cardiopulmonary disease.          MRI HAND LEFT WO CONTRAST    (Results Pending)   XR CHEST PORTABLE    (Results Pending)     EEG 1/31/22  Impression: This EEG is abnormal. The generalized diffuse slowing is suggestive of severe diffuse encephalopathy. There is no evidence of epileptiform discharges, focal, or lateralizing abnormalities. Assessment/Plan:    Principal Problem (Resolved):    Septic shock (HCC)  Active Problems:    Uncontrolled type 2 diabetes mellitus with diabetic polyneuropathy (HCC)    Cardiomyopathy ([address]    Mixed hyperlipidemia    Acute osteomyelitis of left foot (HCC)    Acute renal failure (HCC)    MSSA bacteremia    Leukocytosis    Third degree burn of left hand    Acute hypoxemic respiratory failure (HCC)    Cardiopulmonary arrest (HCC)    Abscess of left foot    Hypertriglyceridemia    Staphylococcal arthritis of left foot (HCC)    Antibiotic-associated diarrhea    Acute encephalopathy    Second degree burn of multiple fingers of left hand excluding thumb    Infective tenosynovitis of extensor tendons of left hand    Enterococcal infection  Resolved Problems:    Hyperglycemia    Acute respiratory failure with hypoxia (HCC)    Syncope    Acute kidney injury ([address]    Uremia       #MSSA bacteremia. MSSA foot abscess. Septic shock.  He was initially on Ancef. He was then changed to broad-spectrum antibiotics. Presently on Unasyn. Had staph aureus and Enterococcus grew from the culture.  ID consultation. Jonny Stringer reviewed. EF is 35%. Antibiotics changed to IV cefepime and Zyvox 1/30 given persistent fevers. Culture sent. MRI of the left foot done. It showed a fluid collection likely an abscess/septic joint and osteomyelitis. Ulcer debridement done. ID has stopped broad spectrum antibiotics. They have started Nafcillin. This was changed to Mike. #RODRICK. Patient admitted to hospital with RODRICK.  Normal renal function October 2021. Brandyn Vickers is suspected to be prerenal/ATN.   Creatinine worsened.  Nephrology consulted. Abran  was started on IV fluids  Emergent Vas-Cath placed and CRRT started.  Critical care consulted  CRRT stopped 1/27--> Lasix 100 mg IV x1 given--no response. On hemodialysis.        #Acute respiratory failure. Suspect patient developed acute pulmonary edema causing acute respiratory failure from renal failure. Intubated 1/23/22.  Pulmonary consulted. Not having purposeful movements or following commands. obtain head CT-negative for acute findings. EEG ordered. Bronc with BAL and cultures 1/29. He is following some simple commands. EEG negative for seizures. Vent management per pulmonary     #H/o cardiomyopathy - checked echo with EF 35%, cardio consulted     #S/p PEA arrest 1/23/22    #Burn injury to the left hand. ID is concerned about infection. Ortho consulted. MRI of the left hand ordered.     # Left foot abscess - s/p I&D, ID and podiatry consulted, cx sent-Staph aureus. I and D done. Will likely need to go back to the OR for more surgery. He now has a wound VAC. A. fib with controlled ventricular rate- in NSR       #Diabetes mellitus type 2 uncontrolled.  Insulin.  Continue sliding scale. Lantus 55 units twice daily. monitor sugars. Started on insulin drip 1/30     #Hypertension. BP was soft but now improved.         Heparin gtt for DVT prophylaxis.       Diet: Diet NPO  ADULT TUBE FEEDING; Orogastric; Renal Formula; Continuous; 20; Yes; 20; Q 4 hours; 40; 30; Q 4 hours; Protein; one proteinex P2Go TWICE daily via feeding tube  Code Status: Full Code   DVT prophylaxis: Subcu heparin    PT/OT Eval Status: ordered    Dispo - cont care    Zahida Bunch MD 2/4/2022 12:21 PM

## 2022-02-04 NOTE — PROGRESS NOTES
02/03/22 1941   Vent Information   Vent Type 980   Vent Mode AC/VC   Vt Ordered 480 mL   Rate Set 22 bmp   Peak Flow 70 L/min   Pressure Support 0 cmH20   FiO2  45 %   SpO2 98 %   SpO2/FiO2 ratio 217.78   Sensitivity 3   PEEP/CPAP 5   I Time/ I Time % 0 s   Humidification Source Heated wire   Humidification Temp 37   Humidification Temp Measured 36.4   Vent Patient Data   High Peep/I Pressure 0   Peak Inspiratory Pressure 24 cmH2O   Mean Airway Pressure 12 cmH20   Rate Measured 35 br/min   Vt Exhaled 554 mL   Minute Volume 16.9 Liters   I:E Ratio 1:1.50   Plateau Pressure 23 DNX58   Static Compliance 31 mL/cmH2O   Dynamic Compliance 29 mL/cmH2O   Cough/Sputum   Sputum How Obtained Suctioned;Endotracheal   Sputum Amount Small   Sputum Color Creamy; Tan   Tenacity Thick   Spontaneous Breathing Trial (SBT) RT Doc   Pulse 76   Breath Sounds   Right Upper Lobe Diminished   Right Middle Lobe Diminished   Right Lower Lobe Diminished   Left Upper Lobe Diminished   Left Lower Lobe Diminished   Additional Respiratory  Assessments   Resp (!) 32   Alarm Settings   High Pressure Alarm 45 cmH2O   Low Minute Volume Alarm 3 L/min   High Respiratory Rate 50 br/min   Patient Observation   Observations ETT 7.5   ETT (adult)   Placement Date: 01/23/22   Preoxygenation: Yes  Technique: Direct laryngoscopy  Type: Cuffed  Tube Size: 7.5 mm  Insertion attempts: 1  Secured at: 24 cm  Measured From: Lips   Secured at 24 cm   Measured From [address] [place] [address],[address] By [employer] tube wheeler   Site Condition Dry

## 2022-02-04 NOTE — PROGRESS NOTES
Progress Note    HISTORY     CC:  AMS          We are following for acute kidney injury       Subjective/   HPI:    Remains in the ICU. More awake. Last HD on 2/2 with 2 liters removed, post-weight of 131.4 kg. Ran with lines reversed at lower blood flow of 250-300 ml/min. Renal function remains poor, not making much urine or showing signs of renal recovery. ROS:  Constitutional:  + fevers  Respiratory: On vent  :  Anuric     EXAM       Objective/     Vitals:    02/04/22 0500 02/04/22 0600 02/04/22 0700 02/04/22 0741   BP: (!) 154/84 (!) 159/85 (!) 156/88    Pulse: 88 88 90 88   Resp: 30 29 (!) 34 (!) 31   Temp:       TempSrc:       SpO2: 95% 95% 95% 94%   Weight:       Height:         24HR INTAKE/OUTPUT:      Intake/Output Summary (Last 24 hours) at 2/4/2022 0816  Last data filed at 2/4/2022 0802  Gross per 24 hour   Intake 3195.72 ml   Output 25 ml   Net 3170.72 ml     Constitutional:  Critically ill   Eyes:  Pupils reactive, sclera clear   Neck:  Normal thyroid, no masses   Cardiovascular:  Regular, no rub; + LE edema  Respiratory: On vent, no wheezing  Psychiatry:  Sedated on vent   Abdomen: +bs, soft, nt, no masses   Musculoskeletal: No LE edema, no clubbing   Lymphatics:  No LAD in neck, no supraclavicular nodes       MEDICAL DECISION MAKING       Data/  Recent Labs     02/02/22  0500 02/02/22  0500 02/03/22  0433 02/03/22  1930 02/04/22  0423   WBC 21.6*  --  17.8*  --  19.4*   HGB 7.5*   < > 6.6* 7.4* 7.4*   HCT 22.7*   < > 20.2* 22.6* 22.4*   MCV 92.0  --  93.0  --  93.1     --  263  --  320    < > = values in this interval not displayed.      Recent Labs     02/02/22  0500 02/03/22  0433 02/04/22  0423   * 132* 131*   K 5.0 4.9 5.7*   CL 97* 98* 97*   CO2 16* 19* 15*   GLUCOSE 222* 159* 150*   PHOS 8.2* 8.7* 11.6*   BUN 91* 78* 97*   CREATININE 5.5* 5.3* 6.9*   LABGLOM 11* 12* 9*   GFRAA 14* 14* 11*       Assessment/     RODRICK likely related to prerenal factors in the setting of sepsis, use of diuretics and losartan contributing to multifactorial ATN. UA is not suggestive of staph associated glomerulonephritis.   Patient did not respond to IV fluid and developed acute pulmonary edema and renal replacement therapy initiated on 1/23/22   No signs of renal recovery, now on HD MWF schedule     -Acute pulmonary edema              Status post intubation   On vent      -Acute hypoxic respiratory failure     -Cardio respiratory arrest     -Hyperkalemia     -Sepsis with MSSA bacteremia with left foot abscess s/p drainage, osteomyelitis     -Anemia - prn prbc's     -Diabetes, uncontrolled    Plan/     -HD today with UF as tolerated with prn albumin, crit line monitoring  -Eventual TDC  -Trend labs, bp's, cultures, & urine output

## 2022-02-04 NOTE — PROGRESS NOTES
Treatment time: 240min    Net UF: 2000ml    Pre weight: Not available. Post weight: 133.7kg ( 1 pillow and 1 top sheet  EDW: TBD    Access used: Rt Fem vas cath  Access function: Positional and affected with activity     Medications or blood products given: Albumin 12.5gm IV x2 in HD    Regular outpatient schedule: RODRICK    Summary of response to treatment:Tolerated fairly well. Reached UFG. No refill per crit line. Copy of dialysis treatment record placed in chart, to be scanned into EMR.

## 2022-02-04 NOTE — PROGRESS NOTES
Shift handoff report given to Colleen Mack RN at bedside. Pt is sedated on vent  IV handoff completed. 4 eyes to follow. Call light within reach, bed in lowest position, bed alarm on. End of shift checks completed. Pt has been free of falls for duration of shift. Henri Peña

## 2022-02-04 NOTE — PROGRESS NOTES
02/03/22 2256   Vent Information   Vent Type 980   Vent Mode AC/VC   Vt Ordered 480 mL   Rate Set 22 bmp   Peak Flow 70 L/min   Pressure Support 0 cmH20   FiO2  40 %   SpO2 98 %   SpO2/FiO2 ratio 245   Sensitivity 3   PEEP/CPAP 5   I Time/ I Time % 0 s   Humidification Source Heated wire   Humidification Temp 37   Humidification Temp Measured 37   Circuit Condensation Drained   Vent Patient Data   High Peep/I Pressure 0   Peak Inspiratory Pressure 22 cmH2O   Mean Airway Pressure 10 cmH20   Rate Measured 29 br/min   Vt Exhaled 441 mL   Minute Volume 13.4 Liters   I:E Ratio 1:1.40   Plateau Pressure 23 GTD09   Cough/Sputum   Sputum How Obtained Endotracheal;Suctioned   Cough Productive   Sputum Amount Small   Sputum Color Creamy   Tenacity Thick   Spontaneous Breathing Trial (SBT) RT Doc   Pulse 91   Breath Sounds   Right Upper Lobe Diminished   Right Middle Lobe Diminished   Right Lower Lobe Diminished   Left Upper Lobe Diminished   Left Lower Lobe Diminished   Additional Respiratory  Assessments   Resp (!) 35   Position Semi-Springer's   Alarm Settings   High Pressure Alarm 45 cmH2O   Low Minute Volume Alarm 3 L/min   High Respiratory Rate 50 br/min   Patient Observation   Observations ETT SIZE 7.5, SECURED AT 24 LIP LINE. AMBU BAG AT HEAD OF BED. Heather Isbell.     ETT (adult)   Placement Date: 01/23/22   Preoxygenation: Yes  Technique: Direct laryngoscopy  Type: Cuffed  Tube Size: 7.5 mm  Insertion attempts: 1  Secured at: 24 cm  Measured From: Lips   Secured at 24 cm   Measured From [address] By [employer] tube wheeler   Site Condition Dry

## 2022-02-04 NOTE — PROGRESS NOTES
Patient back from MRI scanner, tolerated fair no drop in sao2, patient received Fentanyl 50 mcg at 1520, [id number] and 1555 patient also received Versed 2 mg at 1520 and 1545 for agitation, pat returned to unit on monitor and transport ventilator 100% fio2.

## 2022-02-04 NOTE — PROGRESS NOTES
PROGRESS NOTE    Admit Date:  1/22/2022    Subjective:  42 y.o. male who is seen for evaluation of diabetic foot ulcer and abscess left foot. Seen in ICU sedated and on ventilator. History obtained from chart. S/P ulcer debridement 2/1/22.            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    Chronic osteomyelitis of left foot ([address] 10/27/2020    Closed displaced fracture of distal phalanx of right little finger 4/8/2021    Clostridium difficile infection 11/01/2016    Diabetic ulcer of left forefoot associated with type 2 diabetes mellitus, with necrosis of bone ([address] 8/1/2019    Diabetic ulcer of right great toe associated with type 2 diabetes mellitus, with necrosis of bone ([address] 08/2019    Failed soft 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 toe amputation site 10/26/2020    History of hyperbaric oxygen therapy 10/28/2020    [facility] - compromised flap    Migraine     Possible perforated tympanic membrane     as a result of recurrent otitis    Post-op hematoma of left foot 11/12/2020    Recurrent otitis media     Septic shock (HCC)     Syncope     Tear of medial meniscus of left knee     Tobacco use     Toe osteomyelitis, left ([address] 7/24/2020       Past Surgical History:        Procedure Laterality Date    FOOT DEBRIDEMENT Left 2/1/2022    ULCER DEBRIDEMENT LEFT FOOT performed by Jv Long DPM at [address] [facility] [address] ARTHROSCOPY Left [id number]    LEFT KNEE ARTHROSCOPY , SYNOVECTOMY       OTHER SURGICAL HISTORY Left 07/24/2020    PARTIAL LEFT FOOT AMPUTATION    TOE AMPUTATION Right 8/23/2019    PARTIAL RIGHT FOOT AMPUTATION performed by Jv Long DPM at [address] Left 7/24/2020    PARTIAL LEFT FOOT AMPUTATION performed by Jv Long DPM at [address] TOE AMPUTATION Left 10/22/2020    PARTIAL LEFT FOOT AMPUTATION WITH GRAFT APPLICATION performed by Joe Jimenez DPM at [address] [facility] EXTRACTION         Current Medications:     ampicillin-sulbactam  3,000 mg IntraVENous Q12H    Venelex   Topical BID    heparin (porcine)  5,000 Units SubCUTAneous 3 times per day    famotidine (PEPCID) injection  20 mg IntraVENous Daily    sodium chloride flush  5-40 mL IntraVENous 2 times per day    chlorhexidine  15 mL Mouth/Throat BID       Allergies:  Januvia [sitagliptin], Metformin and related, Vancomycin, and Mustard oil [allyl isothiocyanate]    Social History:    Social History     Tobacco Use    Smoking status: Former Smoker     Packs/day: 0.50     Years: 2.00     Pack years: 1.00     Quit date: 2005     Years since quittin.4    Smokeless tobacco: Former User     Quit date: 2005    Tobacco comment: SMOKED OCCASIONALLY UNTIL 8 YEARS AGO   Vaping Use    Vaping Use: Never used   Substance Use Topics    Alcohol use: Yes     Comment: RARELY    Drug use: No       Family History:       Problem Relation Age of Onset    Diabetes Mother     High Blood Pressure Mother     High Cholesterol Mother     Diabetes Father     High Blood Pressure Father     High Cholesterol Father     Stroke Father     High Blood Pressure Sister     High Blood Pressure Maternal Uncle     High Cholesterol Maternal Uncle     High Blood Pressure Maternal Grandmother        Review of Systems    Not obtained      Objective:   BP (!) 158/90   Pulse 98   Temp 100.4 °F (38 °C) (Bladder)   Resp (!) 35   Ht 6' 1\" (1.854 m)   Wt 289 lb 11 oz (131.4 kg)   SpO2 95%   BMI 38.22 kg/m²     Data:  CBC:   Recent Labs     22  0500 22  0500 22  0433 22  1930 22  0423   WBC 21.6*  --  17.8*  --  19.4*   HGB 7.5*   < > 6.6* 7.4* 7.4*   HCT 22.7*   < > 20.2* 22.6* 22.4*   MCV 92.0  --  93.0  --  93.1     --  263  --  320    < > = values in this interval not displayed.      BMP: Recent Labs     02/02/22  0500 02/03/22  0433 02/04/22  0423   * 132* 131*   K 5.0 4.9 5.7*   CL 97* 98* 97*   CO2 16* 19* 15*   PHOS 8.2* 8.7* 11.6*   BUN 91* 78* 97*   CREATININE 5.5* 5.3* 6.9*     LIVER PROFILE:   Recent Labs     02/02/22  0500 02/03/22 0433 02/04/22 0423   AST 56* 22 14*   ALT 7* <5* <5*   BILIDIR 1.1* 2.3* 1.0*   BILITOT 1.4* 2.6* 1.2*   ALKPHOS 576* 491* 423*     PT/INR:   Recent Labs     02/02/22  0500 02/03/22 0433 02/04/22 0423   PROT 6.0* 5.7* 5.9*     HgBA1c:  Lab Results   Component Value Date    LABA1C 10.6 01/23/2022       Cultures: blood - MSSA    Wound - MSSA    Imaging: xray left foot -   Remote surgical history of amputation at the 1st and 2nd digits. Margins of   the remaining 1st metatarsal are smooth with slight bony remodeling following   prior surgery. Margins of the remaining 2nd metatarsal are also smooth with   heterotopic bone and fusion of fragments at the base of the previously   remaining proximal phalanx. Subtle erosions are seen at the 3rd MTP joint   involving the base of the phalanx and the distal head of the 3rd metatarsal.   There is severe soft tissue swelling at the prior surgical site. Questionable pattern of subcutaneous gas along the lateral aspect of the left   foot adjacent to the shaft of the 5th metatarsal.  There is severe   disorganization of bone at the tarsometatarsal joints of the 2nd through 5th   digits. Impression:  1. Remote history of amputation at the 1st and 2nd digits. No evidence of   osteomyelitis at prior resection site. 2. Subtle erosions at the 3rd MTP joint are new. This is adjacent to soft   tissue swelling at the prior amputation site. A new focus of osteomyelitis   is suspected. 3. Soft tissue swelling and questionable subcutaneous gas along the lateral   aspect of the left foot. There is also severe disorganization of bone at the   2nd through 5th tarsometatarsal joints.   Although pattern may represent Charcot arthropathy due to the more diffuse appearance, areas of   osteomyelitis cannot be excluded with plain film. Correlate with physical   exam and clinical workup. A follow-up MRI may be helpful for further   evaluation. MRI Left foot -   1. Diffuse subcutaneous edema with organized complex collection along the   dorsal soft tissues of the foot which communicates with large midfoot   effusion. The dorsal collection measures 2.0 x 4.0 x 4.1 cm. Findings   highly suspicious for abscess and septic joint given patient history. 2. Marrow signal changes throughout the midfoot and extending along the 2nd   through 5th metatarsals which are most suggestive of osteomyelitis in the   setting of soft tissue infection. Underlying Charcot arthropathy also   present. Physical Exam:    DP/PT palpable bilateral  Right foot - no open lesions noted. No pressure lesions noted. Left foot - VAC in place. Prior amputation hallux and 2nd digit  Rocker bottom foot deformity noted left.          Assessment:  Patient Active Problem List   Diagnosis Code    Hypertension I10    Uncontrolled type 2 diabetes mellitus with diabetic polyneuropathy ([facility]) E11.42, E11.65    Cardiomyopathy ([facility] I42.9    Mixed hyperlipidemia E78.2    Allergic rhinitis J30.9    Osteoarthritis M19.90    Acute osteomyelitis of left foot ([facility] M86.172    Acute renal failure ([facility]) N17.9    MSSA bacteremia R78.81, B95.61    Leukocytosis D72.829    Third degree burn of left hand T23.302A    Acute hypoxemic respiratory failure ([facility]) J96.01    Cardiopulmonary arrest ([facility]) I46.9    Abscess of left foot L02.612    Hypertriglyceridemia E78.1    Staphylococcal arthritis of left foot ([facility]) M00.072    Antibiotic-associated diarrhea K52.1, T36.95XA    Acute encephalopathy G93.40    Second degree burn of multiple fingers of left hand excluding thumb T23.232A    Infective tenosynovitis of extensor tendons of left hand M65.142    Enterococcal infection A49.1     Cellulitis/Abscess left foot - S/P debridement 2/1/22  diabetic foot ulcer left secondary to peripheral neuropathy   diabetes mellitus uncontrolled  Bacteremia (MSSA)  Charcot arthropathy left foot      Plan  Patient examined. Reviewed labs and imaging. Antibiotics as per Dr. Dalila Santamaria. Discussed with Dr. Dalila Santamaria. JAVI Veraflo to be continued. Will follow.          Electronically signed by Deborah Patten DPM on 2/4/2022 at 10:02 AM.

## 2022-02-04 NOTE — PLAN OF CARE
Patient remains intubated and off of sedation and following some commands today per notes. Dressing remains intact to the left hand. Awaiting MRI of the hand to evaluate for abscess. Dr Amador Ramachandran has spoken with Dr Leopoldo Reveal today regarding concern for the left hand and likely need for surgical debridement. Will await MRI results for now. Continue ABX and local wound care and dressing changes. Will follow for results.     Ayden Coles PA-C

## 2022-02-04 NOTE — PROGRESS NOTES
Pulmonary & Critical Care Medicine ICU Progress Note    CC: Septic shock, MSSA bacteremia, left foot abscess    Events of Last 24 hours:    Pressure support 8/5 yesterday  Seen by orthopedics for left hand injury  On HD now   Precedex infusion     Vascular lines:    Right IJ 1/23  Right femoral Vas-Cath 1/23    MV: 1/23/22  Vent Mode: AC/VC Rate Set: 22 bmp/Vt Ordered: 480 mL/ /FiO2 : 40 %  Recent Labs     02/03/22  0435 02/04/22 0423   PHART 7.431 7.387   FWC0MPG 27.3* 23.0*   PO2ART 63.3* 80.7       IV:   sodium chloride      sodium chloride      insulin 1.8 Units/hr (02/04/22 0611)    dexmedetomidine HCl in NaCl 2 mcg/kg/hr (02/04/22 0504)    sodium chloride Stopped (02/03/22 0656)    dextrose         Vitals:  Blood pressure (!) 159/85, pulse 88, temperature 100.1 °F (37.8 °C), temperature source Bladder, resp. rate 29, height 6' 1\" (1.854 m), weight 289 lb 11 oz (131.4 kg), SpO2 95 %. on vent    Intake/Output Summary (Last 24 hours) at 2/4/2022 0635  Last data filed at 2/4/2022 0505  Gross per 24 hour   Intake 3531.54 ml   Output 45 ml   Net 3486.54 ml     EXAM:  General: intubated, ill appearing    ENT: Pharynx with ETT. Resp: No crackles. No wheezing. CV: S1, S2. ++edema  GI: NT, ND, +BS  Skin: Warm and dry. Neuro: PERRL. Awake and answering some questions; FC by closing eyes, and raising right thumb.   Patellar reflexes are symmetric      Scheduled Meds:   ampicillin-sulbactam  3,000 mg IntraVENous Q12H    Venelex   Topical BID    heparin (porcine)  5,000 Units SubCUTAneous 3 times per day    famotidine (PEPCID) injection  20 mg IntraVENous Daily    sodium chloride flush  5-40 mL IntraVENous 2 times per day    chlorhexidine  15 mL Mouth/Throat BID       Data:  CBC:   Recent Labs     02/02/22  0500 02/02/22  0500 02/03/22  0433 02/03/22  1930 02/04/22 0423   WBC 21.6*  --  17.8*  --  19.4*   HGB 7.5*   < > 6.6* 7.4* 7.4*   HCT 22.7*   < > 20.2* 22.6* 22.4*   MCV 92.0  --  93.0  --  93.1 collection and changes c/w osteomyelitis, s/p debridement by Dr. Erin Null on 2/1/22  · L hand burn with deep tissue injury, followed by orthopaedics   · Paroxysmal AFIB   · Cardiomyopathy EF 35% on Echo 1/24/22  · DM with hyperglycemia     PLAN:  PSV as tolerated  Mechanical ventilation as per my orders. The ventilator was adjusted by me at the bedside for unstable, life threatening respiratory failure. · Precedex and PRN sedatives only for ventilator tolerance  · IV unasyn per infectious disease    · Nephrology following for HD  · TF  · Insulin gtt   · MRI hand today   · Prophylaxis: SQ heparin, Pepcid  · D/W Dr. Loni Melgar and Vanesa Hawthorne    Total critical care time caring for this patient with life threatening, unstable organ failure, including direct patient contact, management of life support systems, review of data including imaging and labs, discussions with other team members and physicians is 34 minutes so far today, excluding procedures.

## 2022-02-04 NOTE — PROGRESS NOTES
Patient having HD, is somewhat agitated, moving head back and forth, vasc cath difficulty flowing, RR 40-50 on SBT, administered Fentanyl and Versed prn, reposition patient with pillows.

## 2022-02-04 NOTE — PROGRESS NOTES
[facility] Infectious Disease Progress Note      Darwin Schneider     : 1975    DATE OF VISIT:  2022  DATE OF ADMISSION:  2022       Subjective:     Darwin Schneider is a 42 y.o. male whom I've been seeing for an MSSA left foot abscess, septic arthritis, acute midfoot osteo, bacteremia, with multiorgan failure, also a soft tissue abscess and tenosynovitis of the left hand after a burn wound. Since I last saw him, he's doing about the same overall. Still some degree of fever. Still very little urine output. Scheduled for HD today, I believe? Weaning Precedex, on pressure support on the vent now, FIO2 down to 40%, still quite tachypneic. Definitely more awake today, following commands with his eyes and heads, nodding appropriately yes-no at times, not following commands with his extremities. Was seen by orthopedics yesterday, XR done, MRI ordered. Mr. Krista Rolle has a past medical history of Acute osteomyelitis of right hallux ([address] Cellulitis of left foot, CHF (congestive heart failure) ([address] Chronic osteomyelitis of left foot ([address] Closed displaced fracture of distal phalanx of right little finger, Clostridium difficile infection, Diabetic ulcer of left forefoot associated with type 2 diabetes mellitus, with necrosis of bone ([address] Diabetic ulcer of right great toe associated with type 2 diabetes mellitus, with necrosis of bone ([address] Failed soft 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 toe amputation site, History of hyperbaric oxygen therapy, Migraine, Possible perforated tympanic membrane, Post-op hematoma of left foot, Recurrent otitis media, Septic shock ([address] Syncope, Tear of medial meniscus of left knee, Tobacco use, and Toe osteomyelitis, left ([address]     Current Facility-Administered Medications: 0.9 % sodium chloride infusion, , IntraVENous, PRN  ampicillin-sulbactam (UNASYN) 3000 mg ivpb minibag, 3,000 mg, IntraVENous, Q12H  oxyCODONE-acetaminophen (PERCOCET) 5-325 MG per tablet 1 tablet, 1 tablet, Oral, Q4H PRN **OR** oxyCODONE-acetaminophen (PERCOCET) 5-325 MG per tablet 2 tablet, 2 tablet, Oral, Q4H PRN  sodium chloride 0.9 % irrigation 1,000 mL, 1,000 mL, Irrigation, Continuous PRN  fentaNYL (SUBLIMAZE) injection 50 mcg, 50 mcg, IntraVENous, Q1H PRN  insulin regular (HUMULIN R;NOVOLIN R) 100 Units in sodium chloride 0.9 % 100 mL infusion, 0.5 Units/hr, IntraVENous, Continuous  Venelex ointment, , Topical, BID  albumin human 25 % IV solution 12.5 g, 12.5 g, IntraVENous, PRN  heparin (porcine) injection 2,600 Units, 2,600 Units, IntraCATHeter, PRN  heparin (porcine) injection 5,000 Units, 5,000 Units, SubCUTAneous, 3 times per day  dexmedetomidine (PRECEDEX) 400 mcg in sodium chloride 0.9 % 100 mL infusion, 0.2-2 mcg/kg/hr, IntraVENous, Continuous  midazolam (VERSED) injection 2 mg, 2 mg, IntraVENous, Q1H PRN  famotidine (PEPCID) injection 20 mg, 20 mg, IntraVENous, Daily  sodium chloride flush 0.9 % injection 5-40 mL, 5-40 mL, IntraVENous, 2 times per day  sodium chloride flush 0.9 % injection 5-40 mL, 5-40 mL, IntraVENous, PRN  0.9 % sodium chloride infusion, 25 mL, IntraVENous, PRN  acetaminophen (TYLENOL) tablet 650 mg, 650 mg, Oral, Q6H PRN **OR** acetaminophen (TYLENOL) suppository 650 mg, 650 mg, Rectal, Q6H PRN  glucose (GLUTOSE) 40 % oral gel 15 g, 15 g, Oral, PRN  glucagon (rDNA) injection 1 mg, 1 mg, IntraMUSCular, PRN  dextrose 5 % solution, 100 mL/hr, IntraVENous, PRN  dextrose bolus (hypoglycemia) 10% 125 mL, 125 mL, IntraVENous, PRN **OR** dextrose bolus (hypoglycemia) 10% 250 mL, 250 mL, IntraVENous, PRN  chlorhexidine (PERIDEX) 0.12 % solution 15 mL, 15 mL, Mouth/Throat, BID     This is day 2 of Unasyn, so day 2 of Enterococcal therapy, day 13 of MSSA therapy, POD 3 from the foot surgery.      Allergies: Januvia [sitagliptin], Metformin and related, Vancomycin, and Mustard oil [allyl isothiocyanate]    Pertinent items from the review of systems are discussed in the HPI; the remainder of the ROS was reviewed and is negative. Objective:     Vital signs over the last 24 hours:  Temp  Av.3 °F (37.9 °C)  Min: 99.6 °F (37.6 °C)  Max: 101.4 °F (38.6 °C)  Pulse  Av.4  Min: 67  Max: 98  Systolic (60SCA), GCP:431 , Min:123 , NLV:043   Diastolic (98DHW), KKF:02, Min:64, Max:90  Resp  Av.4  Min: 24  Max: 42  SpO2  Av.5 %  Min: 93 %  Max: 99 %    Constitutional:  well-developed, well-nourished, sedated on the vent but more awake to exam today  Psychiatric: looks just a bit anxious and agitated, but does follow commands with his eyes, seems to recognize me  Eyes:  pupils equal, round, reactive to light; sclerae anicteric, conjunctivae not pale  ENT:  atraumatic; no labial ulcers; orally intubated  Resp:  lungs I think clearer to auscultation BL than last week, decreased at the bases, a few crackles  Cardiovascular:  heart regular, not tachy now, audible heart sounds, no murmur; generally mild extremity edema; no IV phlebitis; right IJ CVC in place, and a right femoral CRRT line, a couple of peripherals  GI:  abdomen soft, NT, distended, hypoactive bowel sounds, no palpable masses or organomegaly; rectal tube now in place with recurrent diarrhea  : Munoz in place, small amount of brownish urine now  Musculoskeletal:  no clubbing, cyanosis or petechiae; extremities with no gross effusions or acute arthritis  Skin: warm, dry, normal turgor; no acute rash, no peripheral stigmata of endocarditis. Left foot with VAC in place, left hand wound not examined today.  ______________________________    Recent Labs     22  0423 22  1930 22  0433 22  0500 22  0500   WBC 19.4*  --  17.8*  --  21.6*   HGB 7.4* 7.4* 6.6*   < > 7.5*   HCT 22.4* 22.6* 20.2*   < > 22.7*   MCV 93.1  --  93.0  --  92.0     --  263  --  234    < > = values in this interval not displayed.      Lab Results   Component Value Date    CREATININE 6.9 ([place]) 2022     Lab Results   Component Value Date    LABALBU 2.3 (L) 02/04/2022     Lab Results   Component Value Date    ALT <5 (L) 02/04/2022    AST 14 (L) 02/04/2022     (H) 01/31/2022    ALKPHOS 423 (H) 02/04/2022    BILITOT 1.2 (H) 02/04/2022      Lab Results   Component Value Date    LABA1C 10.6 01/23/2022     Other recent pertinent labs:  Glucoses in the 100s on insulin drip. Anion gap 19, BUN 97. Phos up to 11.6. ANC still 16.9k.   ______________________________    Recent pertinent micro results:  Left hand WCx with MSSA and E faecalis. Left foot bone Cx with MSSA. Latest BCx on Jan 30 were (-) x 2.  ______________________________    Recent imaging results (last 7 days):     XR HAND LEFT (MIN 3 VIEWS)    Result Date: 2/3/2022  No radiographic evidence of osteomyelitis with technical limitations as above. CT HEAD WO CONTRAST    Result Date: 1/28/2022  1. No acute intracranial abnormality. XR CHEST PORTABLE    Result Date: 2/4/2022  Multifocal opacities in the left lung likely with some left basilar pleural effusion/atelectasis is again noted. The less prominent opacities in the right lung are also stable. ET, NG, and right jugular line appear acceptable. [to me, looks like fluid overload / CHF, with a layering left effusion and some fluid in the minor fissure.]    XR CHEST PORTABLE    Result Date: 2/3/2022  1. No significant change. XR CHEST PORTABLE    Result Date: 2/2/2022  Increasing airspace opacification in the right lower lung zone that may represent underlying pneumonitis. Asymmetric edema may also be considered in this intubated patient. XR CHEST PORTABLE    Result Date: 2/1/2022  No significant interval change. XR CHEST PORTABLE    Result Date: 1/31/2022  Cardiomegaly with left basilar effusion and bibasilar infiltrates. Stable support tubes. XR CHEST PORTABLE    Result Date: 1/30/2022  1. Stable lines, tubes, and support devices.  2. Bilateral airspace opacities with pleural effusions, left greater than right. 3. Cardiomegaly. XR CHEST PORTABLE    Result Date: 1/29/2022  1. Stable lines, tubes, and support devices. 2. Stable cardiopulmonary status after accounting for differences in patient positioning including bilateral airspace opacities. 3. Cardiomegaly. 4. Low lung volumes. MRI FOOT LEFT WO CONTRAST    Result Date: 1/31/2022  1. Diffuse subcutaneous edema with organized complex collection along the dorsal soft tissues of the foot which communicates with large midfoot effusion. The dorsal collection measures 2.0 x 4.0 x 4.1 cm. Findings highly suspicious for abscess and septic joint given patient history. 2. Marrow signal changes throughout the midfoot and extending along the 2nd through 5th metatarsals which are most suggestive of osteomyelitis in the setting of soft tissue infection. Underlying Charcot arthropathy also present.       Assessment:     Patient Active Problem List   Diagnosis Code    Hypertension I10    Uncontrolled type 2 diabetes mellitus with diabetic polyneuropathy ([facility]) E11.42, E11.65    Cardiomyopathy ([facility] I42.9    Mixed hyperlipidemia E78.2    Allergic rhinitis J30.9    Osteoarthritis M19.90    Acute osteomyelitis of left foot ([facility] M86.172    Acute renal failure ([facility]) N17.9    MSSA bacteremia R78.81, B95.61    Leukocytosis D72.829    Third degree burn of left hand T23.302A    Acute hypoxemic respiratory failure ([facility]) J96.01    Cardiopulmonary arrest ([facility]) I46.9    Abscess of left foot L02.612    Hypertriglyceridemia E78.1    Staphylococcal arthritis of left foot ([facility]) M00.072    Antibiotic-associated diarrhea K52.1, T36.95XA    Acute encephalopathy G93.40    Second degree burn of multiple fingers of left hand excluding thumb T23.232A    Infective tenosynovitis of extensor tendons of left hand M65.142    Enterococcal infection A49.1     Assessment of today's active condition(s):      --          Background of uncontrolled DM2, neuropathy, no known PAD, multiple prior diabetic foot ulcers, infections, surgeries. Most recent wounds were at the left 1st and 2nd ray, but they had been healed for just about a year.      --          Admission this time with a fairly nonfocal sepsis syndrome, at least in terms of symptoms, complicated by shock, acute renal failure, lactic acidosis, then cardiac arrest, resuscitation, acute respiratory failure, rapid Afib. Currently with vent support, sedation, insulin drip, and on intermittent HD. Found to have MSSA bacteremia, and a sizeable MSSA foot abscess, septic midfoot arthritis and acute osteo of at least a couple of midfoot bones / metatarsal bases.     --          Ongoing hypoxemic respiratory failure, I think at least in part due to CHF / fluid overload after cardiac arrest. Still stubborn oliguric renal failure, azotemia, elevated phos, etc.      --          Third degree burn of left hand, now with purulence beneath the lysing eschar seen to be running along extensor tendons - with that degree of retained purulence, that COULD explain the fever in the last day or two, even after the foot was drained and debrided. Uncertain how pathogenic that Enterococcus is, but with ongoing fever, I think we do need to treat it now. MRI pending.      --          Ongoing SIRS as mentioned, I think from the left hand and left foot. No recent evidence of VAP, new bacteremia, UTI, Cdiff. -- Mental status improving somewhat, though a little concern that he's not following commands with (or generally moving) his extremities; still has some Precedex on board, plus effects of fever, renal failure etc could be at play. Treatment recs:     Continue Unasyn for now. Await MRI left hand, but even if no sizeable abscess, per se, I do think he would benefit from an I&D in the OR given the degree of purulence that was seen along his extensor tendons.  I can manage ongoing wound care after that (whether conservative dressings, VAC, etc). If he gets another fever in the next 24 hours, would at least repeat a couple of sets of blood cultures, given the central IV catheters in place. Ongoing VAC Veraflo for the left foot per Dr. Pio Richmond and Kimberli Gabriel. Watch for Cdiff (current diarrhea from tube feeds and ABx, I think), Candidiasis, line site infection, etc.     D/W Drew Terrazas and Gissel Ann, and his RN at bedside. I was not necessarily going to be in the hospital over the weekend to see Mr. Charlene Esteves. Will keep an eye on Epic from home.  Please call or text if there are any urgent concerns over the weekend with his clinical course, test results, etc.    Electronically signed by Katherine Cash MD on 2/4/2022 at 9:21 AM.

## 2022-02-04 NOTE — FLOWSHEET NOTE
02/04/22 1300   Negative Pressure Wound Therapy Foot Left;Dorsal   Placement Date/Time: 02/02/22 1100   Pre-existing: No  Inserted by: Ida Rowley CWSILVESTREN  Location: Foot  Wound Location Orientation: Left;Dorsal   $ Standard NPWT >50 sq cm PER TX $ Yes   Wound Type Surgical   Unit Type   (Vac Ulta with Veraflo)   Dressing Type Black foam;Non-adherant   Number of pieces used   (1 black foam, one pc of vaseline gauze)   Cycle Continuous   Target Pressure (mmHg) 125   Intensity 1   Irrigation Solution Sodium chloride 0.9%   Instillation Volume  14 mL   Soak Time  3 minutes   Vac Frequency 2 hours   Canister changed? Yes   Dressing Status Changed   Dressing Changed Changed/New   Drainage Amount Small   Drainage Description Serosanguinous   Dressing Change Due 02/07/22   Wound Assessment Granulation tissue;Slough   Shanita-wound Assessment Intact   Odor None   Left dorsal foot:  60% red, moist granulation tissue, 40% loose yellow slough. Periwound skin intact. No odor or purulence noted. Tendons exposed are white and moist, viable. Bone palpated near 9 o'clock in base. Pt seen for VAC dressing change. One black foam and 3 pieces of vaseline gauze removed. Spoke with Dr Vivien Corral and Dr Trevor Christine and photos reviewed. Will remove vaseline gauze from lateral tendons. Periwound skin prepped using barrier wipe and vac drape. One pc of Vaseline gauze used over medial tendon only. One black foam used, good seal obtained. LLE wrapped with roll gauze and 2 four inch ace wraps. Next VAC change on Monday.

## 2022-02-05 NOTE — PROGRESS NOTES
----- Message from Christina Horton sent at 8/26/2020 12:53 PM CDT -----  Regarding: med/tomorrow's appt  Contact: Lidia Grimes  Pt will be out of anxiety medication she was given in hospital and is unsure if they will make the appt tomorrow with the weather.  Please call daughter at [phone]     Attempted to call dialysis nurse and unsuccessful. Will try again.

## 2022-02-05 NOTE — PLAN OF CARE
Problem: Sleep Pattern Disturbance:  Goal: Appears well-rested  Description: Appears well-rested  Outcome: Met This Shift     Problem: Falls - Risk of:  Goal: Will remain free from falls  Description: Will remain free from falls  Outcome: Ongoing  Note: Fall precautions in place. Goal: Absence of physical injury  Description: Absence of physical injury  Outcome: Ongoing     Problem: Skin Integrity:  Goal: Will show no infection signs and symptoms  Description: Will show no infection signs and symptoms  Outcome: Ongoing  Note: Patient turned/repositioned every 2 hours and as needed to maintain and improve skin integrity. Goal: Absence of new skin breakdown  Description: Absence of new skin breakdown  Outcome: Ongoing     Problem: Confusion - Acute:  Goal: Absence of continued neurological deterioration signs and symptoms  Description: Absence of continued neurological deterioration signs and symptoms  Outcome: Ongoing  Goal: Mental status will be restored to baseline  Description: Mental status will be restored to baseline  Outcome: Ongoing     Problem: Discharge Planning:  Goal: Ability to perform activities of daily living will improve  Description: Ability to perform activities of daily living will improve  Outcome: Ongoing  Goal: Participates in care planning  Description: Participates in care planning  Outcome: Ongoing     Problem: Injury - Risk of, Physical Injury:  Goal: Will remain free from falls  Description: Will remain free from falls  Outcome: Ongoing  Note: Fall precautions in place.     Goal: Absence of physical injury  Description: Absence of physical injury  Outcome: Ongoing     Problem: Mood - Altered:  Goal: Mood stable  Description: Mood stable  Outcome: Ongoing  Goal: Absence of abusive behavior  Description: Absence of abusive behavior  Outcome: Ongoing  Goal: Verbalizations of feeling emotionally comfortable while being cared for will increase  Description: Verbalizations of feeling emotionally comfortable while being cared for will increase  Outcome: Ongoing     Problem: Psychomotor Activity - Altered:  Goal: Absence of psychomotor disturbance signs and symptoms  Description: Absence of psychomotor disturbance signs and symptoms  Outcome: Ongoing     Problem: Sensory Perception - Impaired:  Goal: Demonstrations of improved sensory functioning will increase  Description: Demonstrations of improved sensory functioning will increase  Outcome: Ongoing  Goal: Decrease in sensory misperception frequency  Description: Decrease in sensory misperception frequency  Outcome: Ongoing  Goal: Able to refrain from responding to false sensory perceptions  Description: Able to refrain from responding to false sensory perceptions  Outcome: Ongoing  Goal: Demonstrates accurate environmental perceptions  Description: Demonstrates accurate environmental perceptions  Outcome: Ongoing  Goal: Able to distinguish between reality-based and nonreality-based thinking  Description: Able to distinguish between reality-based and nonreality-based thinking  Outcome: Ongoing  Goal: Able to interrupt nonreality-based thinking  Description: Able to interrupt nonreality-based thinking  Outcome: Ongoing     Problem: Nutrition  Goal: Optimal nutrition therapy  Outcome: Ongoing  Note: Patient receiving nutrition via continuous tube feed.   Goal: Understanding of nutritional guidelines  Outcome: Ongoing     Problem: Coping:  Goal: Ability to cope will improve  Description: Ability to cope will improve  Outcome: Ongoing     Problem: Non-Violent Restraints  Goal: Removal from restraints as soon as assessed to be safe  Outcome: Completed  Goal: No harm/injury to patient while restraints in use  Outcome: Completed  Goal: Patient's dignity will be maintained  Outcome: Completed

## 2022-02-05 NOTE — PROGRESS NOTES
02/05/22 0317   Vent Information   $Ventilation $Subsequent Day   Vent Type 980   Vent Mode AC/VC   Vt Ordered 480 mL   Rate Set 22 bmp   Peak Flow 70 L/min   Pressure Support 0 cmH20   FiO2  40 %   SpO2 100 %   SpO2/FiO2 ratio 250   Sensitivity 3   PEEP/CPAP 5   I Time/ I Time % 0 s   Humidification Source Heated wire   Humidification Temp Measured 37   Circuit Condensation Drained   Vent Patient Data   High Peep/I Pressure 0   Peak Inspiratory Pressure 19 cmH2O   Mean Airway Pressure 9.5 cmH20   Rate Measured 23 br/min   Vt Exhaled 505 mL   Minute Volume 11.7 Liters   I:E Ratio 1:2.60   Cough/Sputum   Sputum How Obtained Endotracheal   Cough Productive   Sputum Amount Moderate   Sputum Color Dark Yellow   Tenacity Thick   Spontaneous Breathing Trial (SBT) RT Doc   Pulse 84   Breath Sounds   Right Upper Lobe Diminished   Right Middle Lobe Diminished   Right Lower Lobe Diminished   Left Upper Lobe Diminished   Left Lower Lobe Diminished   Additional Respiratory  Assessments   Resp 22   Alarm Settings   High Pressure Alarm 45 cmH2O   Low Minute Volume Alarm 3 L/min   Apnea (secs) 20 secs   High Respiratory Rate 50 br/min   Low Exhaled Vt  300 mL   Patient Observation   Observations 7.5 ett 24 at lip

## 2022-02-05 NOTE — H&P
Preoperative H&P Update    The patient's History and Physical in the medical record from 1/23/22 was reviewed by me today. I reviewed the HPI, medications, allergies, reason for surgery, diagnosis and treatment plan and there has been no interval change. The patient was examined by me today.  Physical exam findings for this update include:    BP (!) 143/91   Pulse 84   Temp 98.6 °F (37 °C) (Bladder)   Resp 22   Ht 6' 1\" (1.854 m)   Wt 281 lb 4.9 oz (127.6 kg)   SpO2 97%   BMI 37.11 kg/m²   Airway is intact  Chest: breathing comfortably  Heart: regular rate  Findings on exam of the body region where surgery is to be performed include: see last office and/or consult note      Electronically signed by Denia Gibson MD on 2/5/2022 at 7:10 AM

## 2022-02-05 NOTE — PROGRESS NOTES
Patient report given to Sheryl Rodriguez, [place]. Patient has rested comfortably on the ventilator overnight without acute changes in assessment noted. Received Tylenol 650mg at 2040 for bladder temp 100.7, patient afebrile at present. Has been NPO since midnight for anticipated OR procedure this AM. PRBC transfusion continues. Care transferred.

## 2022-02-05 NOTE — PROGRESS NOTES
MRI reviewed. 1. Osteomyelitis of the 3rd metacarpal head tapering into the mid shaft. Osteomyelitis of the 3rd proximal phalangeal base and shaft.  Moderate 3rd   metacarpophalangeal joint effusion compatible with septic arthritis. 2. Mild marrow edema in the 5th metacarpal head and 5th proximal phalangeal   base with normal T1 signal compatible with noninfectious reactive osteitis   versus less likely early osteomyelitis. 3. Extensive subcutaneous edema compatible with cellulitis.  3 x 2.6 x 0.6 cm   abscess versus phlegmon in the soft tissues dorsal to the 4th and 5th   metacarpals. 4. Extensive edema within the interosseous musculature compatible with   myositis. 5. Mild ulnar palmar bursitis distal to the carpal tunnel. Plan for left hand incision and drainage of abscess tomorrow 8AM.    Will stop tube feeds at midnight.          Electronically signed by Zander Salazar MD on 2/4/2022 at 7:09 PM

## 2022-02-05 NOTE — PROGRESS NOTES
Rounding completed with Dr. Jaylen Tolentino and multidisciplinary team. POC, labs, lines and assessment reviewed.    - SBT started at 0945  - Transfuse 1 unit PBR  - Hold SQ heparin

## 2022-02-05 NOTE — BRIEF OP NOTE
Brief Postoperative Note      Patient: Sully Larsen  YOB: 1975  MRN: [id number]    Date of Procedure: 2/5/2022    Pre-Op Diagnosis: LEFT HAND ABSCESS    Post-Op Diagnosis: Same       Procedure(s):  LEFT HAND INCISION AND DRAINAGE    Surgeon(s):  Radha Shah MD    Assistant:  Surgical Assistant: Cole Dominguez    Anesthesia: General    Estimated Blood Loss (mL): Minimal    Complications: None    Specimens:   ID Type Source Tests Collected by Time Destination   1 : LEFT HAND ABSCESS Specimen Hand CULTURE, SURGICAL Radha Shah MD 2/5/2022 0831        Implants:  * No implants in log *      Drains:   Negative Pressure Wound Therapy Foot Left;Dorsal (Active)   $ Standard NPWT <=50 sq cm PER TX $ Yes 02/02/22 1134   $ Standard NPWT >50 sq cm PER TX $ Yes 02/04/22 1300   Wound Type Surgical 02/05/22 0400   Unit Type Vac Ulta with Veraflo 02/04/22 0400   Dressing Type Black foam 02/05/22 0400   Number of pieces used 1 02/02/22 1134   Cycle Continuous 02/05/22 0400   Target Pressure (mmHg) 125 02/04/22 1300   Intensity 1 02/04/22 1300   Irrigation Solution Sodium chloride 0.9% 02/04/22 1300   Instillation Volume  14 mL 02/04/22 1300   Soak Time  3 minutes 02/04/22 1300   Vac Frequency 2 hours 02/04/22 1300   Canister changed? Yes 02/04/22 1300   Dressing Status Clean;Dry; Intact 02/05/22 0400   Dressing Changed Changed/New 02/04/22 1300   Drainage Amount Small 02/04/22 1300   Drainage Description Serosanguinous 02/04/22 1300   Dressing Change Due 02/07/22 02/04/22 1300   Wound Assessment Granulation tissue;Slough 02/04/22 1300   Shanita-wound Assessment Intact 02/04/22 1300   Odor None 02/04/22 1300       NG/OG/NJ/NE Tube Center mouth (Active)   Surrounding Skin Dry 02/04/22 0813   Securement device Yes 02/04/22 0813   Status Chimney 02/04/22 0813   Placement Verified by X-Ray (repeat) 02/04/22 0813   NG/OG/NJ/NE External Measurement (cm) 63 cm 02/04/22 0813   Drainage Appearance Bile 02/04/22 0813   Tube Feeding High Protein 02/04/22 0813   Tube Feeding Status Continuous 02/04/22 0813   Rate/Schedule 0 mL/hr 02/05/22 0000   Tube Feeding Supplement (Product) Protein Modular 02/04/22 0813   Tube Feeding Supplement Amount (mL) 75 02/03/22 0814   Tube Feeding Intake (mL) 160 ml 02/05/22 0000   Free Water Flush (mL) 90 mL 02/05/22 0000   Free Water Rate 30q4h 02/04/22 0813   Residual Volume (ml) 0 ml 02/04/22 0813   Output (mL) 0 ml 02/04/22 0813       Rectal Tube With balloon (Active)   Stool Appearance Watery 02/04/22 1806   Stool Color Brown 02/04/22 1806   Rectal Tube Output 1100 ml 02/05/22 0536       Urethral Catheter Temperature probe 16 fr (Active)   Catheter Indications Need for fluid volume management of the critically ill patient in a critical care setting 02/04/22 1803   Site Assessment Urethral drainage;Pink 02/05/22 0536   Urine Color Bloody;Brown 02/05/22 0536   Urine Appearance Sediment 02/05/22 0536   Output (mL) 40 mL 02/05/22 0536       Findings: left hand dorsal abscess      Electronically signed by Osmani Lema MD on 2/5/2022 at 8:34 AM

## 2022-02-05 NOTE — PROGRESS NOTES
Order for rapid sequence intubation obtained. Patient pre-oxygenated to with NRB to a saturation 90 percent. 4 mg of Versed ordered and administered at 1659 hours  20 mg of Etomidate ordered and administered at 1700 hours  100 mg of ROCC ordered and administered at 1701 hours     Dr. Thanh Gutierrez inserted a number  8 ET tube. The ET tube is secured at 24 cm measured at the patients lip line. Breath sounds are equal bilaterally. There is a positive color change noted in the colorimetric CO2 detector. RT connected the patient to a ventilator. See orders for ventilator orders. OG tube inserted. Ausculation of air bolus is positive. Aspirated stomach contents are clear. POST Intubation ORDERS    ABG ordered in 1 hours  Portable Chest x-ray ordered to confirm placement of the patients ET tube and gastric tube. Bilateral wrist restraints ordered and initiated. Pulses present distal to restraints. Propofol drip ordered for sedation. See EMAR and orders. See physician note to follow.  Electronically signed by Althea Marquez RN on 2/5/2022 at 4:46 PM

## 2022-02-05 NOTE — PROGRESS NOTES
Pt awake, constantly double stacking the vent. Switched pt to [facility] rr 22, insp. Pressure 17, I time . 7, 405, peep 5. Pt vt around 480.

## 2022-02-05 NOTE — PROGRESS NOTES
Patient intubated with 7.5 ETT at the 24cm lip. BIlateral breath sounds auscultated with good color change.

## 2022-02-05 NOTE — PROGRESS NOTES
1 unit PRBCs initiated. Writer at bedside X15 minutes. Patient tolerating without incident. Will monitor.

## 2022-02-05 NOTE — PROGRESS NOTES
Patient care assumed, assessment completed as charted. Patient continues on ventilator, #7.5 at the 24 lip line. PC mode, 22/40%/5. Right upper arm PICC in place with Precedex infusing at 2mcg/kg/hr, and Insulin per protocol. Wound vac in place to left foot, left hand wrapped. Munoz catheter and rectal tube patent. OG in place with tube feed running at 40mL/hr, will turn off at midnight for I&D in AM. Patient's spouse contacted for consent, form placed in chart. No further needs assessed at this time. Will monitor.

## 2022-02-05 NOTE — ANESTHESIA PRE PROCEDURE
Department of Anesthesiology  Preprocedure Note       Name:  Cassie Gillette   Age:  42 y.o.  :  1975                                          MRN:  [id number]         Date:  2022      Surgeon: John Nelson):  Zander Salazar MD    Procedure: Procedure(s):  ARM INCISION AND DRAINAGE    Medications prior to admission:   Prior to Admission medications    Medication Sig Start Date End Date Taking? Authorizing Provider   Insulin Degludec (TRESIBA FLEXTOUCH) 200 UNIT/ML SOPN INJECT 76 UNITS INTO THE SKIN NIGHTLY 22  Yes Marcella Mijares MD   losartan (COZAAR) 50 MG tablet TAKE 1 TABLET BY MOUTH DAILY 22  Yes Marcella Mijares MD   tiZANidine (ZANAFLEX) 4 MG tablet TAKE 2 TABLETS BY MOUTH NIGHTLY 22  Yes Marcella Mijares MD   carvedilol (COREG) 12.5 MG tablet TAKE 1 TABLET BY MOUTH 2 TIMES DAILY (WITH MEALS) 21  Yes Marcella Mijares MD   rosuvastatin (CRESTOR) 20 MG tablet TAKE 1 TABLET BY MOUTH NIGHTLY 21  Yes Joao Azevedo MD   traZODone (DESYREL) 50 MG tablet TAKE 1 TABLET BY MOUTH NIGHTLY 21  Yes Marcella Mijares MD   furosemide (LASIX) 20 MG tablet TAKE 1 TABLET BY MOUTH DAILY 21  Yes Marcella Mijares MD   gabapentin (NEURONTIN) 400 MG capsule TAKE 2 CAPSULES BY MOUTH 3 TIMES DAILY FOR 90 DAYS. 21 Yes Marcella Mijares MD   insulin lispro (HUMALOG KWIKPEN) 200 UNIT/ML SOPN pen Inject 25 Units into the skin 3 times daily (before meals) 10/14/21  Yes Marcella Mijares MD   glimepiride (AMARYL) 4 MG tablet TAKE 1 TABLET BY MOUTH EVERY MORNING (BEFORE BREAKFAST). 9/10/21  Yes Marcella Mijares MD   PARoxetine (PAXIL) 10 MG tablet TAKE 1 TABLET BY MOUTH EVERY MORNING 9/10/21  Yes Marcella Mijares MD   blood glucose test strips (ONETOUCH ULTRA) strip USE TO TEST BLOOD GLUCOSE DAILY. DX:E11.9 3/5/21  Yes Marcella Mijares MD   Blood Glucose Monitoring Suppl (CONTOUR NEXT EZ) w/Device KIT Use to test glucose daily. DX: E11.9 12/10/20  Yes Judith Fernández MD   Insulin Pen Needle 32G X 6 MM MISC Use with insulin daily . DX:E11.9 12/4/20  Yes Judith Fernández MD   blood glucose monitor strips Use to test blood sugar daily.   DX:E11.9 12/4/20  Yes Judith Fernández MD   oxymetazoline (AFRIN) 0.05 % nasal spray 2 sprays by Nasal route daily Indications: prior to HBO   Yes Historical Provider, MD   loratadine (CLARITIN) 10 MG tablet Take 10 mg by mouth nightly as needed    Yes Historical Provider, MD   sildenafil (VIAGRA) 100 MG tablet TAKE 1 TABLET BY MOUTH AS NEEDED FOR ERECTILE DYSFUNCTION 8/27/19  Yes Judith Fernández MD       Current medications:    Current Facility-Administered Medications   Medication Dose Route Frequency Provider Last Rate Last Admin    0.9 % sodium chloride infusion   IntraVENous PRN Elena Ramirez MD        heparin (porcine) injection 3,000 Units  3,000 Units IntraVENous PRN Joe Carrillo MD   3,000 Units at 02/04/22 1107    0.9 % sodium chloride infusion   IntraVENous PRN Elena Ramirez MD        ampicillin-sulbactam (UNASYN) 3000 mg ivpb minibag  3,000 mg IntraVENous Q12H Zuleyma Das MD   Stopped at 02/04/22 2320    oxyCODONE-acetaminophen (PERCOCET) 5-325 MG per tablet 1 tablet  1 tablet Oral Q4H PRN Alonso Curry DPM   1 tablet at 02/02/22 6293    Or    oxyCODONE-acetaminophen (PERCOCET) 5-325 MG per tablet 2 tablet  2 tablet Oral Q4H PRN Alonso Curry DPM   2 tablet at 02/03/22 2331    sodium chloride 0.9 % irrigation 1,000 mL  1,000 mL Irrigation Continuous PRN Alonso Curry DPM        fentaNYL (SUBLIMAZE) injection 50 mcg  50 mcg IntraVENous Q1H PRN Miriam Phelps MD   50 mcg at 02/04/22 1828    insulin regular (HUMULIN R;NOVOLIN R) 100 Units in sodium chloride 0.9 % 100 mL infusion  0.5 Units/hr IntraVENous Continuous Brian Jean MD 3.7 mL/hr at 02/05/22 0704 3.71 Units/hr at 02/05/22 0704    Venelex ointment   Topical BID Lore Tee MD   Given at 02/04/22 0624    albumin human 25 % IV solution 12.5 g  12.5 g IntraVENous PRN Cece Vann  mL/hr at 02/04/22 1250 12.5 g at 02/04/22 1250    heparin (porcine) injection 2,600 Units  2,600 Units IntraCATHeter PRN Cece Vann MD   2,600 Units at 02/02/22 1112    heparin (porcine) injection 5,000 Units  5,000 Units SubCUTAneous 3 times per day Froy Deshpande MD   5,000 Units at 02/04/22 2040    dexmedetomidine (PRECEDEX) 400 mcg in sodium chloride 0.9 % 100 mL infusion  0.2-2 mcg/kg/hr IntraVENous Continuous Froy Deshpande MD 67 mL/hr at 02/05/22 0646 2 mcg/kg/hr at 02/05/22 0646    midazolam (VERSED) injection 2 mg  2 mg IntraVENous Q1H PRN Deborah Hamilton MD   2 mg at 02/05/22 0735    famotidine (PEPCID) injection 20 mg  20 mg IntraVENous Daily Huy Carrillo MD   20 mg at 02/04/22 0809    sodium chloride flush 0.9 % injection 5-40 mL  5-40 mL IntraVENous 2 times per day Patricia Galvan MD   10 mL at 02/04/22 2040    sodium chloride flush 0.9 % injection 5-40 mL  5-40 mL IntraVENous PRN Patricia Galvan MD        0.9 % sodium chloride infusion  25 mL IntraVENous PRN Patricia Galvan MD   Paused at 02/03/22 0656    acetaminophen (TYLENOL) tablet 650 mg  650 mg Oral Q6H PRN Patricia Galvan MD   650 mg at 02/04/22 2040    Or    acetaminophen (TYLENOL) suppository 650 mg  650 mg Rectal Q6H PRN Patricia Galvan MD        glucose (GLUTOSE) 40 % oral gel 15 g  15 g Oral PRN Patricia Galvan MD        glucagon (rDNA) injection 1 mg  1 mg IntraMUSCular PRN Patricia Galvan MD        dextrose 5 % solution  100 mL/hr IntraVENous PRN Patricia Galvan MD        dextrose bolus (hypoglycemia) 10% 125 mL  125 mL IntraVENous PRN Patricia Galvan MD        Or    dextrose bolus (hypoglycemia) 10% 250 mL  250 mL IntraVENous PRN Patricia Galvan MD        chlorhexidine (PERIDEX) 0.12 % solution 15 mL  15 mL Mouth/Throat BID Deborah Hamilton MD   15 mL at 02/04/22 2040       Allergies: Allergies   Allergen Reactions    Januvia [Sitagliptin] Nausea Only     Has taken metformin without side effects in the past.  Nausea with Janumet in the past.     Metformin And Related      GI Upset    Vancomycin      Pt had red face, swelling itching of eyelids, sore throat after receiving vancmomyin and cefepime. I think this was a histamine releasing reaction from vancomycin most likely.  The cefepime was switched to Zosyn and patient had no reaction to Zosyn    Mustard [employer] Isothiocyanate] Swelling and Rash       Problem List:    Patient Active Problem List   Diagnosis Code    Hypertension I10    Uncontrolled type 2 diabetes mellitus with diabetic polyneuropathy ([facility]) E11.42, E11.65    Cardiomyopathy ([facility] [address] I42.9    Mixed hyperlipidemia E78.2    Allergic rhinitis J30.9    Osteoarthritis M19.90    Acute osteomyelitis of left foot ([facility]) M86.172    Acute renal failure ([facility]) N17.9    MSSA bacteremia R78.81, B95.61    Leukocytosis D72.829    Third degree burn of left hand T23.302A    Acute hypoxemic respiratory failure ([facility]) J96.01    Cardiopulmonary arrest ([facility]) I46.9    Abscess of left foot L02.612    Hypertriglyceridemia E78.1    Staphylococcal arthritis of left foot ([facility]) M00.072    Antibiotic-associated diarrhea K52.1, T36.95XA    Acute encephalopathy G93.40    Second degree burn of multiple fingers of left hand excluding thumb T23.232A    Infective tenosynovitis of extensor tendons of left hand M65.142    Enterococcal infection A49.1       Past Medical History:        Diagnosis Date    Acute osteomyelitis of right hallux ([facility] [address] 08/2019    Cellulitis of left foot 7/26/2020    CHF (congestive heart failure) ([facility] [address] 09/2014    presumably from viral myocarditis    Chronic osteomyelitis of left foot ([facility] [address] 10/27/2020    Closed displaced fracture of distal phalanx of right little finger 4/8/2021    Clostridium difficile infection 11/01/2016    Diabetic ulcer of left forefoot associated with type 2 diabetes mellitus, with necrosis of bone ([address] 2019    Diabetic ulcer of right great toe associated with type 2 diabetes mellitus, with necrosis of bone ([address] 2019    Failed soft 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 toe amputation site 10/26/2020    History of hyperbaric oxygen therapy 10/28/2020    [facility]- [facility] - compromised flap    Migraine     Possible perforated tympanic membrane     as a result of recurrent otitis    Post-op hematoma of left foot 2020    Recurrent otitis media     Septic shock (HCC)     Syncope     Tear of medial meniscus of left knee     Tobacco use     Toe osteomyelitis, left ([address] 2020       Past Surgical History:        Procedure Laterality Date    FOOT DEBRIDEMENT Left 2022    ULCER DEBRIDEMENT LEFT FOOT performed by Jv Long DPM at [facility] ARTHROSCOPY Left [id number]    LEFT KNEE ARTHROSCOPY , SYNOVECTOMY       OTHER SURGICAL HISTORY Left 2020    PARTIAL LEFT FOOT AMPUTATION    TOE AMPUTATION Right 2019    PARTIAL RIGHT FOOT AMPUTATION performed by Jv Long DPM at [address] [place] [address] Left 2020    PARTIAL LEFT FOOT AMPUTATION performed by Jv Long D[facility] at [facility] TOE AMPUTATION Left 10/22/2020    PARTIAL LEFT FOOT AMPUTATION WITH GRAFT APPLICATION performed by Jv Long DPM at [address] [place] [address] EXTRACTION         Social History:    Social History     Tobacco Use    Smoking status: Former Smoker     Packs/day: 0.50     Years: 2.00     Pack years: 1.00     Quit date: 2005     Years since quittin.4    Smokeless tobacco: Former User     Quit date: 2005    Tobacco comment: SMOKED OCCASIONALLY UNTIL 8 YEARS AGO   Substance Use Topics    Alcohol use: Yes     Comment: RARELY                                Counseling given: Not Answered  Comment: SMOKED OCCASIONALLY UNTIL 8 YEARS AGO      Vital Signs (Current):   Vitals:    22 0400 02/05/22 0500 02/05/22 0600 02/05/22 0700   BP: 114/88 117/73 129/80 (!) 143/91   Pulse: 78 75 78 84   Resp: 23 26 23 22   Temp: 98.6 °F (37 °C)      TempSrc: Bladder      SpO2: 99% 99% 99% 97%   Weight:       Height:                                                  BP Readings from Last 3 Encounters:   02/05/22 (!) 143/91   02/01/22 (!) 84/51   10/14/21 (!) 130/90       NPO Status:                                                                                 BMI:   Wt Readings from Last 3 Encounters:   02/05/22 281 lb 4.9 oz (127.6 kg)   10/14/21 282 lb (127.9 kg)   03/11/21 276 lb (125.2 kg)     Body mass index is 37.11 kg/m².     CBC:   Lab Results   Component Value Date    WBC 14.5 02/05/2022    RBC 1.96 02/05/2022    HGB 5.9 02/05/2022    HCT 18.2 02/05/2022    MCV 93.0 02/05/2022    RDW 14.2 02/05/2022     02/05/2022       CMP:   Lab Results   Component Value Date     02/05/2022    K 5.4 02/05/2022    K 3.7 01/23/2022    CL 99 02/05/2022    CO2 19 02/05/2022    BUN 81 02/05/2022    CREATININE 5.8 02/05/2022    GFRAA 13 02/05/2022    AGRATIO 0.8 01/22/2022    LABGLOM 11 02/05/2022    LABGLOM 103 09/14/2015    GLUCOSE 147 02/05/2022    PROT 5.6 02/05/2022    CALCIUM 7.9 02/05/2022    BILITOT 1.0 02/05/2022    ALKPHOS 624 02/05/2022    AST 26 02/05/2022    ALT 9 02/05/2022       POC Tests:   Recent Labs     02/05/22  0704   POCGLU 113*       Coags:   Lab Results   Component Value Date    PROTIME 15.5 01/26/2022    INR 1.35 01/26/2022    APTT 67.1 01/24/2022       HCG (If Applicable): No results found for: PREGTESTUR, PREGSERUM, HCG, HCGQUANT     ABGs:   Lab Results   Component Value Date    PHART 7.394 02/05/2022    PO2ART 71.4 02/05/2022    OCX2DQK 29.3 02/05/2022    NDM2REX 17.5 02/05/2022    BEART -6.7 02/05/2022    H7KKISPF 94.6 02/05/2022        Type & Screen (If Applicable):  No results found for: LABABO, LABRH    Drug/Infectious Status (If Applicable):  No results found for: HIV, HEPCAB    COVID-19 Screening (If Applicable):   Lab Results   Component Value Date    COVID19 Not Detected 01/22/2022           Anesthesia Evaluation  Patient summary reviewed and Nursing notes reviewed no history of anesthetic complications:   Airway: Mallampati: Unable to assess / NA     Neck ROM: full   Dental:          Pulmonary:Negative Pulmonary ROS and normal exam                               Cardiovascular:Negative CV ROS    (+) hypertension:, CHF:, hyperlipidemia                  Neuro/Psych:   Negative Neuro/Psych ROS  (+) neuromuscular disease (neuropathy):, headaches: migraine headaches,             GI/Hepatic/Renal: Neg GI/Hepatic/Renal ROS  (+) renal disease: ARF,      (-) hiatal hernia and GERD       Endo/Other: Negative Endo/Other ROS   (+) Diabetes, : arthritis:., .                 Abdominal:             Vascular: Other Findings:           Anesthesia Plan      general     ASA 4 - emergent     (D/W POA IV, general anesthesia, IV Narcotics, return to ICU. All questions were answered they agree with the plan and wishes to proceed.  )  Induction: intravenous. Definity contrast administered. Left ventricular systolic function is reduced with ejection fraction   estimated at 35 %. There is global hypokinesis with regional wall variation. There is mild concentric left ventricular hypertrophy. Normal left ventricular diastolic filling pressure. Right ventricular systolic function is moderately reduced . The right atrium is mildly dilated. Mild mitral regurgitation. Mild tricuspid regurgitation. Normal systolic pulmonary artery pressure (SPAP) estimated at 44 mmHg (RA   pressure 15 mmHg). Mild pulmonic regurgitation present. There is a small pericardial effusion noted. There is a pleural effusion. No valvular vegetations are visualization.       Signature      ------------------------------------------------------------------   Electronically signed by Charles Patten MD, [facility] [place]   (Interpreting physician) on 2022 at 02:55 PM    Pre-Operative Diagnosis: Hyperglycemia [R73.9]; RODRICK (acute kidney injury) ([facility] [N17.9]; Acute renal failure, unspecified acute renal failure type ([facility] [address] [N17.9]; Syncope, unspecified syncope type [R55]    42 y.o.   BMI:  Body mass index is 37.11 kg/m². Vitals:    22 0400 22 0500 22 0600 22 0700   BP: 114/88 117/73 129/80 (!) 143/91   Pulse: 78 75 78 84   Resp:    Temp: 98.6 °F (37 °C)      TempSrc: Bladder      SpO2: 99% 99% 99% 97%   Weight:       Height:           Allergies   Allergen Reactions    Januvia [Sitagliptin] Nausea Only     Has taken metformin without side effects in the past.  Nausea with Janumet in the past.     Metformin And Related      GI Upset    Vancomycin      Pt had red face, swelling itching of eyelids, sore throat after receiving vancmomyin and cefepime. I think this was a histamine releasing reaction from vancomycin most likely.  The cefepime was switched to Zosyn and patient had no reaction to Zosyn    Mustard [employer] Isothiocyanate] Swelling and Rash       Social History     Tobacco Use    Smoking status: Former Smoker     Packs/day: 0.50     Years: 2.00     Pack years: 1.00     Quit date: 2005     Years since quittin.4    Smokeless tobacco: Former User     Quit date: 2005    Tobacco comment: SMOKED OCCASIONALLY UNTIL 8 YEARS AGO   Substance Use Topics    Alcohol use: Yes     Comment: RARELY       LABS:    CBC  Lab Results   Component Value Date/Time    WBC 14.5 (H) 2022 04:05 AM    HGB 5.9 (LL) 2022 04:05 AM    HCT 18.2 (LL) 2022 04:05 AM     2022 04:05 AM     RENAL  Lab Results   Component Value Date/Time     (L) 2022 04:05 AM    K 5.4 (H) 2022 04:05 AM    K 3.7 2022 02:10 PM    CL 99 2022 04:05 AM    CO2 19 (L) 2022 04:05 AM    BUN 81 (HH) 2022 04:05 AM    CREATININE 5.8 (HH) 02/05/2022 04:05 AM    GLUCOSE 147 (H) 02/05/2022 04:05 AM     COAGS  Lab Results   Component Value Date/Time    PROTIME 15.5 (H) 01/26/2022 08:03 AM    INR 1.35 (H) 01/26/2022 08:03 AM    APTT 67.1 (H) 01/24/2022 07:00 PM           Tree Link MD   2/5/2022

## 2022-02-05 NOTE — PROGRESS NOTES
Pt had increased WOB. Placed on 15L HFNC with no increase in SpO2. Placed NRB on top. Dr. Jaswinder Chen notified and orders placed for intubation. Pt intubated and placed on previous vent settings. Propofol started and infusing at 20 mcg/kg/min.

## 2022-02-05 NOTE — PROGRESS NOTES
This note also relates to the following rows which could not be included:  Vt Ordered - Cannot attach notes to unvalidated device data  Rate Set - Cannot attach notes to unvalidated device data  Peak Flow - Cannot attach notes to unvalidated device data  Pressure Support - Cannot attach notes to unvalidated device data  FiO2  - Cannot attach notes to unvalidated device data  SpO2 - Cannot attach notes to unvalidated device data  Sensitivity - Cannot attach notes to unvalidated device data  PEEP/CPAP - Cannot attach notes to unvalidated device data  I Time/ I Time % - Cannot attach notes to unvalidated device data  High Peep/I Pressure - Cannot attach notes to unvalidated device data  Peak Inspiratory Pressure - Cannot attach notes to unvalidated device data  Mean Airway Pressure - Cannot attach notes to unvalidated device data  Rate Measured - Cannot attach notes to unvalidated device data  Vt Exhaled - Cannot attach notes to unvalidated device data  Minute Volume - Cannot attach notes to unvalidated device data  I:E Ratio - Cannot attach notes to unvalidated device data  Pulse - Cannot attach notes to unvalidated device data  Resp - Cannot attach notes to unvalidated device data  High Pressure Alarm - Cannot attach notes to unvalidated device data  Low Minute Volume Alarm - Cannot attach notes to unvalidated device data  High Respiratory Rate - Cannot attach notes to unvalidated device data       02/04/22 2255   Vent Information   Vent Type 980   Vent Mode AC/VC   Humidification Source Heated wire   Humidification Temp 37   Humidification Temp Measured 37   Circuit Condensation Drained   Vent Patient Data   Plateau Pressure 20 LNT99   Cough/Sputum   Sputum How Obtained Endotracheal;Suctioned   Cough Productive   Sputum Amount Small   Sputum Color Creamy   Tenacity Thick   Breath Sounds   Right Upper Lobe Rhonchi   Right Middle Lobe Diminished   Right Lower Lobe Rhonchi   Left Upper Lobe Diminished   Left Lower Lobe Diminished   Additional Respiratory  Assessments   Position Semi-Springer's   Alarm Settings   Apnea (secs) 20 secs   Low Exhaled Vt  300 mL   ETT (adult)   Placement Date: 01/23/22   Preoxygenation: Yes  Technique: Direct laryngoscopy  Type: Cuffed  Tube Size: 7.5 mm  Insertion attempts: 1  Secured at: 24 cm  Measured From: Lips   Secured at 24 cm   Measured From Lips   ET Placement Right  (moved to right)   Secured By Commercial tube wheeler   Site Condition Dry

## 2022-02-05 NOTE — PROGRESS NOTES
ABG discussed with Dr. Dianne Zazueta. Increase rate on vent to 28 and give 1 amp of bicarb. Repeat ABG after HD.

## 2022-02-05 NOTE — PROGRESS NOTES
SBT started at 0945. 5/5 30%. Tube feed has been off. Precedex decreased and infusing at 1.5 mcg/kg/h.

## 2022-02-05 NOTE — PROGRESS NOTES
02/04/22 1926   Vent Information   Vent Type 980   Vent Mode AC/PC   Vt Ordered 0 mL   Pressure Ordered 17   Rate Set 22 bmp   Peak Flow 0 L/min   Pressure Support 0 cmH20   FiO2  40 %   SpO2 97 %   SpO2/FiO2 ratio 242.5   Sensitivity 3   PEEP/CPAP 5   I Time/ I Time % 0 s   Humidification Source Heated wire   Circuit Condensation Drained   Vent Patient Data   High Peep/I Pressure 17   Peak Inspiratory Pressure 24 cmH2O   Mean Airway Pressure 13 cmH20   Rate Measured 41 br/min   Vt Exhaled 433 mL   Minute Volume 15.4 Liters   I:E Ratio 1:2.00   Plateau Pressure 18 QPA58   Static Compliance 33 mL/cmH2O   Dynamic Compliance 23 mL/cmH2O   Cough/Sputum   Sputum How Obtained Endotracheal;Suctioned   Cough Productive   Sputum Amount Small   Sputum Color Creamy   Tenacity Thick   Spontaneous Breathing Trial (SBT) RT Doc   Pulse 110   Breath Sounds   Right Upper Lobe Rhonchi   Right Middle Lobe Diminished   Right Lower Lobe Rhonchi   Left Upper Lobe Diminished   Left Lower Lobe Diminished   Additional Respiratory  Assessments   Resp 27   Position Semi-Springer's   Alarm Settings   High Pressure Alarm 45 cmH2O   Low Minute Volume Alarm 3 L/min   Apnea (secs) 20 secs   High Respiratory Rate 50 br/min   Low Exhaled Vt  300 mL   ETT (adult)   Placement Date: 01/23/22   Preoxygenation: Yes  Technique: Direct laryngoscopy  Type: Cuffed  Tube Size: 7.5 mm  Insertion attempts: 1  Secured at: 24 cm  Measured From: Lips   Secured at 24 cm   Measured From [facility] [address] By Commercial tube wheeler   Site Condition Dry

## 2022-02-05 NOTE — PROGRESS NOTES
Pulmonary & Critical Care Medicine ICU Progress Note    CC: Septic shock, MSSA bacteremia, left foot abscess    Events of Last 24 hours:    1 U PRBC  Went to the OR today for left hand debridement    Vascular lines:    Right IJ 1/23  Right femoral Vas-Cath 1/23    MV: 1/23/22  Vent Mode: AC/VC Rate Set: 22 bmp/Vt Ordered: 480 mL/ /FiO2 : 30 %  Recent Labs     02/04/22  0423 02/05/22  0457   PHART 7.387 7.394   ZRL5CMP 23.0* 29.3*   PO2ART 80.7 71.4*       IV:   sodium chloride      sodium chloride      sodium chloride      insulin 4.88 Units/hr (02/05/22 0613)    dexmedetomidine HCl in NaCl 2 mcg/kg/hr (02/05/22 0646)    sodium chloride Stopped (02/03/22 0656)    dextrose         Vitals:  Blood pressure 129/80, pulse 78, temperature 98.6 °F (37 °C), temperature source Bladder, resp. rate 23, height 6' 1\" (1.854 m), weight 281 lb 4.9 oz (127.6 kg), SpO2 99 %. on vent    Intake/Output Summary (Last 24 hours) at 2/5/2022 0653  Last data filed at 2/5/2022 0536  Gross per 24 hour   Intake 2102.77 ml   Output 1505 ml   Net 597.77 ml     EXAM:  General: intubated, ill appearing    ENT: Pharynx with ETT. Resp: No crackles. No wheezing. CV: S1, S2. ++edema  GI: NT, ND, +BS  Skin: Warm and dry. Neuro: PERRL. Awake and answering some questions; FC by closing eyes, and raising right thumb.   Patellar reflexes are symmetric      Scheduled Meds:   ampicillin-sulbactam  3,000 mg IntraVENous Q12H    Venelex   Topical BID    heparin (porcine)  5,000 Units SubCUTAneous 3 times per day    famotidine (PEPCID) injection  20 mg IntraVENous Daily    sodium chloride flush  5-40 mL IntraVENous 2 times per day    chlorhexidine  15 mL Mouth/Throat BID       Data:  CBC:   Recent Labs     02/03/22  0433 02/03/22  0433 02/03/22  1930 02/04/22  0423 02/05/22  0405   WBC 17.8*  --   --  19.4* 14.5*   HGB 6.6*   < > 7.4* 7.4* 5.9*   HCT 20.2*   < > 22.6* 22.4* 18.2*   MCV 93.0  --   --  93.1 93.0     --   --  320 344 < > = values in this interval not displayed. BMP:   Recent Labs     02/03/22  0433 02/04/22  0423 02/05/22  0405   * 131* 134*   K 4.9 5.7* 5.4*   CL 98* 97* 99   CO2 19* 15* 19*   PHOS 8.7* 11.6* 10.4*   BUN 78* 97* 81*   CREATININE 5.3* 6.9* 5.8*     LIVER PROFILE:   Recent Labs     02/03/22  0433 02/04/22  0423 02/05/22  0405   AST 22 14* 26   ALT <5* <5* 9*   BILIDIR 2.3* 1.0* 0.6*   BILITOT 2.6* 1.2* 1.0   ALKPHOS 491* 423* 624*       Microbiology:  1/22 [facility] MSSA  1/22 COVID-19 and influenza negative  1/23/22 Blood cx: NGTD  1/23 tracheal aspirate MSSA  1/24 Wound cx: MSSA  1/24 BC MSSA  1/29/22 BAL pending  1/30/2022 blood sent  1/31/2022 left hand Enterococcus and staph aureus  2/1/2022 bone MSSA    Echo 1/25/22: EF 35%, global HK, reduced RV function    CXR 2/5/2022 multifocal airspace disease, favor edema    MRI Left foot 2/1/22   Impression   1. Diffuse subcutaneous edema with organized complex collection along the   dorsal soft tissues of the foot which communicates with large midfoot   effusion.  The dorsal collection measures 2.0 x 4.0 x 4.1 cm.  Findings   highly suspicious for abscess and septic joint given patient history. 2. Marrow signal changes throughout the midfoot and extending along the 2nd   through 5th metatarsals which are most suggestive of osteomyelitis in the   setting of soft tissue infection.  Underlying Charcot arthropathy also   present. MRI left hand 2/4/2022  Impression:        1. Osteomyelitis of the 3rd metacarpal head tapering into the mid shaft. Osteomyelitis of the 3rd proximal phalangeal base and shaft.  Moderate 3rd   metacarpophalangeal joint effusion compatible with septic arthritis. 2. Mild marrow edema in the 5th metacarpal head and 5th proximal phalangeal   base with normal T1 signal compatible with noninfectious reactive osteitis   versus less likely early osteomyelitis.    3. Extensive subcutaneous edema compatible with cellulitis.  3 x 2.6 x 0.6 cm abscess versus phlegmon in the soft tissues dorsal to the 4th and 5th   metacarpals. 4. Extensive edema within the interosseous musculature compatible with   myositis. 5. Mild ulnar palmar bursitis distal to the carpal tunnel. 1/31/22 EEG:This EEG is abnormal. The generalized diffuse slowing is suggestive of severe diffuse encephalopathy. There is no evidence of epileptiform discharges, focal, or lateralizing abnormalities. ASSESSMENT:  · Acute hypoxemic respiratory failure   · Septic shock    · Cardiopulmonary arrest x 2 1/23/2022, required CPR, TTM - rewarmed 8 pm 1/25/22  · Acute kidney failure  · MSSA bacteremia  · L foot abscess drained 1/24/2022, MSSA; MRI with large fluid collection and changes c/w osteomyelitis, s/p debridement by Dr. Erin Null on 2/1/22  · L hand burn with deep tissue injury, followed by orthopaedics with plan for debridement today  · Paroxysmal AFIB   · Cardiomyopathy EF 35% on Echo 1/24/22  · DM with hyperglycemia     PLAN:  PS trial today  Mechanical ventilation as per my orders. The ventilator was adjusted by me at the bedside for unstable, life threatening respiratory failure. · Precedex and PRN sedatives only for ventilator tolerance  · ABX D#14, now IV unasyn D#3 per infectious disease    · Nephrology following for HD  · TF (held for OR)  · Insulin gtt   · Prophylaxis: hold SQ heparin, place scd, Pepcid  · D/W spouse at bedside     Total critical care time caring for this patient with life threatening, unstable organ failure, including direct patient contact, management of life support systems, review of data including imaging and labs, discussions with other team members and physicians is 35 minutes so far today, excluding procedures.

## 2022-02-06 NOTE — ANESTHESIA POSTPROCEDURE EVALUATION
Department of Anesthesiology  Postprocedure Note    Patient: Harriett Chand  MRN: [id number]  YOB: 1975  Date of evaluation: 2/6/2022  Time:  8:50 AM     Procedure Summary     Date: 02/05/22 Room / Location: [place] [address] / [facility]    Anesthesia Start: Arville Lavender Anesthesia Stop: 8909    Procedure: LEFT HAND INCISION AND DRAINAGE (Left Hand) Diagnosis: (LEFT HAND ABSCESS)    Surgeons: Courtney Haywood MD Responsible Provider: Nori Brooks MD    Anesthesia Type: general ASA Status: 4 - Emergent          Anesthesia Type: general    Deysi Phase I:      Deysi Phase II:      Last vitals: Reviewed and per EMR flowsheets.        Anesthesia Post Evaluation    Comments: Postoperative Anesthesia Note    Name:    Harriett Chand  MRN:      [id number]    Patient Vitals in the past 12 hrs:  02/06/22 0800, BP:(!) 142/64, Temp:98.5 °F (36.9 °C), Temp src:Bladder, Pulse:119, Resp:28, SpO2:98 %  02/06/22 0700, BP:(!) 147/71, Temp:98.3 °F (36.8 °C), Temp src:Bladder, Pulse:121, Resp:29, SpO2:98 %  02/06/22 0600, BP:139/71, Pulse:120, Resp:28, SpO2:97 %  02/06/22 0500, BP:(!) 148/72, Pulse:125, Resp:(!) 31, SpO2:99 %  02/06/22 0400, BP:129/69, Temp:99.1 °F (37.3 °C), Temp src:Bladder, Pulse:124, Resp:30, SpO2:99 %, Weight:282 lb 13.6 oz (128.3 kg)  02/06/22 0315, Pulse:120, Resp:(!) 38, SpO2:100 %  02/06/22 0300, BP:114/62, Pulse:120, Resp:(!) 39, SpO2:100 %  02/06/22 0200, BP:116/72, Pulse:120, Resp:(!) 36, SpO2:100 %  02/06/22 0100, BP:125/71, Pulse:124, Resp:(!) 33, SpO2:99 %  02/06/22 0020, BP:(!) 149/76, Temp:98.9 °F (37.2 °C)  02/06/22 0000, BP:(!) 149/76, Temp:98.4 °F (36.9 °C), Temp src:Bladder, Pulse:126, Resp:(!) 33, SpO2:99 %  02/05/22 2300, BP:(!) 145/78, Pulse:120, Resp:(!) 31, SpO2:100 %  02/05/22 2200, BP:139/74, Pulse:115, Resp:29, SpO2:98 %  02/05/22 2100, BP:(!) 168/96, Pulse:108, Resp:(!) 32, SpO2:95 %     LABS:    CBC  Lab Results       Component                Value Date/Time                  WBC                      20.8 (H)            02/06/2022 04:36 AM        HGB                      8.7 (L)             02/06/2022 04:36 AM        HCT                      25.7 (L)            02/06/2022 04:36 AM        PLT                      544 (H)             02/06/2022 04:36 AM   RENAL  Lab Results       Component                Value               Date/Time                  NA                       138                 02/06/2022 04:21 AM        K                        4.3                 02/06/2022 04:21 AM        K                        3.7                 01/23/2022 02:10 PM        CL                       100                 02/06/2022 04:21 AM        CO2                      20 (L)              02/06/2022 04:21 AM        BUN                      54 (H)              02/06/2022 04:21 AM        CREATININE               4.5 (H)             02/06/2022 04:21 AM        GLUCOSE                  116 (H)             02/06/2022 04:21 AM   COAGS  Lab Results       Component                Value               Date/Time                  PROTIME                  15.5 (H)            01/26/2022 08:03 AM        INR                      1.35 (H)            01/26/2022 08:03 AM        APTT                     67.1 (H)            01/24/2022 07:00 PM     Intake & Output:  @28ZCSK@    Nausea & Vomiting:  No    Level of Consciousness:  Sedated on vent    Pain Assessment:  Adequate analgesia    Anesthesia Complications:  No apparent anesthetic complications    SUMMARY      Vital signs stable  OK to discharge from Stage I post anesthesia care.   Care transferred from Anesthesiology department on discharge from perioperative area

## 2022-02-06 NOTE — PROGRESS NOTES
Reassessment completed, see flow sheet. FiO2 down to 55% per RT, SpO2 99%. PEEP remains at 10. PRN versed given during HD trx d/t pt's RR sitting at 40-45. When that did not work, PRN fentanyl was given which brought RR down to 30-35. Propofol then increased to 25 towards the end of HD trx since pt's RR continued to sit 34-39. Despite PRNs & increased sedation, BP stable and tolerated HD well - No albumin needed during trx. 1.9 liters off, goal was 2 liters. CHG bath completed. AM labs & ABG collected after HD trx w/o issue. No other changes noted.

## 2022-02-06 NOTE — PROGRESS NOTES
Report given to Judge Mejia, [address]. No needs expressed. POC transferred at this time. - Propofol infusing at 20 mcg/kg/min.

## 2022-02-06 NOTE — PROGRESS NOTES
Department of Orthopedic Surgery  Physician Assistant   Progress Note    Subjective:     Post-Operative Day: #1.  S/P I&D Hand.   Patient intubated    Objective:     Patient Vitals for the past 24 hrs:   BP Temp Temp src Pulse Resp SpO2 Weight   02/06/22 0800 (!) 142/64 98.5 °F (36.9 °C) Bladder 119 28 98 % --   02/06/22 0700 (!) 147/71 98.3 °F (36.8 °C) Bladder 121 29 98 % --   02/06/22 0600 139/71 -- -- 120 28 97 % --   02/06/22 0500 (!) 148/72 -- -- 125 (!) 31 99 % --   02/06/22 0400 129/69 99.1 °F (37.3 °C) Bladder 124 30 99 % 282 lb 13.6 oz (128.3 kg)   02/06/22 0315 -- -- -- 120 (!) 38 100 % --   02/06/22 0300 114/62 -- -- 120 (!) 39 100 % --   02/06/22 0200 116/72 -- -- 120 (!) 36 100 % --   02/06/22 0100 125/71 -- -- 124 (!) 33 99 % --   02/06/22 0020 (!) 149/76 98.9 °F (37.2 °C) -- -- -- -- --   02/06/22 0000 (!) 149/76 98.4 °F (36.9 °C) Bladder 126 (!) 33 99 % --   02/05/22 2300 (!) 145/78 -- -- 120 (!) 31 100 % --   02/05/22 2200 139/74 -- -- 115 29 98 % --   02/05/22 2100 (!) 168/96 -- -- 108 (!) 32 95 % --   02/05/22 2000 (!) 168/85 94.5 °F (34.7 °C) Bladder 106 28 95 % --   02/05/22 1929 -- -- -- 108 (!) 0 93 % --   02/05/22 1900 (!) 170/89 -- -- 111 28 93 % --   02/05/22 1800 (!) 181/93 -- -- 121 20 (!) 82 % --   02/05/22 1700 (!) 169/77 -- -- 131 27 (!) 84 % --   02/05/22 1600 (!) 145/81 97.5 °F (36.4 °C) Bladder 113 23 (!) 88 % --   02/05/22 1500 (!) 164/84 -- -- 116 21 94 % --   02/05/22 1400 (!) 173/87 -- -- 120 (!) 32 98 % --   02/05/22 1300 (!) 172/87 98.3 °F (36.8 °C) Bladder 111 23 92 % --   02/05/22 1257 -- -- -- -- -- (!) 88 % --   02/05/22 1200 (!) 174/86 98.9 °F (37.2 °C) Bladder 103 26 95 % --   02/05/22 1100 (!) 169/95 98.8 °F (37.1 °C) Bladder 89 21 94 % --   02/05/22 1045 (!) 155/88 -- -- 83 23 95 % --   02/05/22 1030 (!) 157/85 -- -- 80 18 95 % --   02/05/22 1000 137/77 98.4 °F (36.9 °C) Bladder 71 15 95 % --   02/05/22 0900 118/65 98.3 °F (36.8 °C) Bladder 62 (!) 0 96 % -- Wound clean. Small amout of drainage on dressing        Data Review  CBC:   Lab Results   Component Value Date    WBC 20.8 02/06/2022    RBC 2.86 02/06/2022    HGB 8.7 02/06/2022    HCT 25.7 02/06/2022     02/06/2022       Assessment:     Status Post left Hand I&D    Plan:      1: Continues current post-op course :  2:  Continue Deep venous thrombosis prophylaxis  4:  Continue Pain Control  5.   Continue wound care

## 2022-02-06 NOTE — PROGRESS NOTES
Munoz changed and urine culture sent. Blood cultures x2 drawn and sent. Respiratory culture collected and sent.

## 2022-02-06 NOTE — PROGRESS NOTES
Pulmonary & Critical Care Medicine ICU Progress Note    CC: Septic shock, MSSA bacteremia, left foot abscess    Events of Last 24 hours:    · OR for left hand debridement   · Extubated on highly favorable RSBI, initially did well but then had progressive weakening of cough and inability to clear secretions, refractory hypoxemia and required emergent reintubation  · Dialysis late yesterday evening  · Hypothermic    Vascular lines:    Right IJ 1/23  Right femoral Vas-Cath 1/23    MV: 1/23/22 - 2/5/22; extubated and re-intubated due to unable to clear secretions/hypoxia on 2/5/22 after less than 12 hours  Vent Mode: AC/VC Rate Set: 28 bmp/Vt Ordered: 430 mL/ /FiO2 : 55 %  Recent Labs     02/05/22  1818 02/06/22  0436   PHART 7.112* 7.438   LNB2YUE 43.7 29.5*   PO2ART 99.4 129.3*       IV:   sodium chloride      propofol 25 mcg/kg/min (02/06/22 0610)    prismaSATE BGK 4/2.5      sodium chloride      insulin 2.8 Units/hr (02/06/22 0610)    dexmedetomidine HCl in NaCl Stopped (02/05/22 1233)    sodium chloride 5 mL/hr at 02/06/22 0610    dextrose         Vitals:  Blood pressure 139/71, pulse 120, temperature 99.1 °F (37.3 °C), temperature source Bladder, resp. rate 28, height 6' 1\" (1.854 m), weight 282 lb 13.6 oz (128.3 kg), SpO2 97 %. on vent    Intake/Output Summary (Last 24 hours) at 2/6/2022 0650  Last data filed at 2/6/2022 0610  Gross per 24 hour   Intake 1947.04 ml   Output 2750 ml   Net -802.96 ml     EXAM:  General: intubated, ill appearing    ENT: Pharynx with ETT. Resp: No crackles. No wheezing. CV: S1, S2. ++edema  GI: NT, ND, +BS  Skin: Warm and dry. Neuro: PERRL. Awake and answering some questions; FC by closing eyes, and raising right thumb.   Patellar reflexes are symmetric      Scheduled Meds:   ampicillin-sulbactam  3,000 mg IntraVENous Q12H    Venelex   Topical BID    famotidine (PEPCID) injection  20 mg IntraVENous Daily    sodium chloride flush  5-40 mL IntraVENous 2 times per day  chlorhexidine  15 mL Mouth/Throat BID       Data:  CBC:   Recent Labs     02/04/22  0423 02/04/22  0423 02/05/22  0405 02/05/22  0405 02/05/22  0910 02/05/22  1310 02/06/22  0436   WBC 19.4*  --  14.5*  --   --   --  20.8*   HGB 7.4*   < > 5.9*   < > 6.5* 8.7* 8.7*   HCT 22.4*   < > 18.2*   < > 19.6* 26.5* 25.7*   MCV 93.1  --  93.0  --   --   --  89.9     --  344  --   --   --  544*    < > = values in this interval not displayed. BMP:   Recent Labs     02/04/22 0423 02/05/22  0405 02/06/22  0421   * 134* 138   K 5.7* 5.4* 4.3   CL 97* 99 100   CO2 15* 19* 20*   PHOS 11.6* 10.4* 7.5*   BUN 97* 81* 54*   CREATININE 6.9* 5.8* 4.5*     LIVER PROFILE:   Recent Labs     02/04/22  0423 02/05/22  0405 02/06/22  0436   AST 14* 26 13*   ALT <5* 9* 6*   BILIDIR 1.0* 0.6* 0.5*   BILITOT 1.2* 1.0 0.8   ALKPHOS 423* 624* 491*       Microbiology:  1/22 [facility] MSSA  1/22 COVID-19 and influenza negative  1/23/22 Blood cx: NGTD  1/23 tracheal aspirate MSSA  1/24 Wound cx: MSSA  1/24 BC MSSA  1/29/22 BAL pending  1/30/2022 blood sent  1/31/2022 left hand Enterococcus and staph aureus  2/1/2022 bone MSSA  2/5/2022 surgical hand culture 1+ [facility]    Echo 1/25/22: EF 35%, global HK, reduced RV function    CXR 2/6/2022 multifocal airspace disease    MRI Left foot 2/1/22   Impression   1. Diffuse subcutaneous edema with organized complex collection along the   dorsal soft tissues of the foot which communicates with large midfoot   effusion.  The dorsal collection measures 2.0 x 4.0 x 4.1 cm.  Findings   highly suspicious for abscess and septic joint given patient history. 2. Marrow signal changes throughout the midfoot and extending along the 2nd   through 5th metatarsals which are most suggestive of osteomyelitis in the   setting of soft tissue infection.  Underlying Charcot arthropathy also   present. MRI left hand 2/4/2022  Impression:        1. Osteomyelitis of the 3rd metacarpal head tapering into the mid shaft. Osteomyelitis of the 3rd proximal phalangeal base and shaft.  Moderate 3rd   metacarpophalangeal joint effusion compatible with septic arthritis. 2. Mild marrow edema in the 5th metacarpal head and 5th proximal phalangeal   base with normal T1 signal compatible with noninfectious reactive osteitis   versus less likely early osteomyelitis. 3. Extensive subcutaneous edema compatible with cellulitis.  3 x 2.6 x 0.6 cm   abscess versus phlegmon in the soft tissues dorsal to the 4th and 5th   metacarpals. 4. Extensive edema within the interosseous musculature compatible with   myositis. 5. Mild ulnar palmar bursitis distal to the carpal tunnel. 1/31/22 EEG:This EEG is abnormal. The generalized diffuse slowing is suggestive of severe diffuse encephalopathy. There is no evidence of epileptiform discharges, focal, or lateralizing abnormalities. ASSESSMENT:  · Acute hypoxemic respiratory failure   · Increased tracheal secretions and failure of planned extubation 2/5/22, suspect new VAP   · Septic shock    · Cardiopulmonary arrest x 2 1/23/2022, required CPR, TTM - rewarmed 8 pm 1/25/22  · Acute kidney failure  · MSSA bacteremia  · L foot abscess drained 1/24/2022, MSSA; MRI with large fluid collection and changes c/w osteomyelitis, s/p debridement by Dr. Moran Second on 2/1/22  · L hand burn with deep tissue injury & abscess, s/p debridement on 2/5/22  · Paroxysmal AFIB   · Cardiomyopathy EF 35% on Echo 1/24/22  · DM with hyperglycemia     PLAN:  Mechanical ventilation as per my orders. The ventilator was adjusted by me at the bedside for unstable, life threatening respiratory failure.    · Propofol for RASS -1 to -2  · ABX D#15, now IV unasyn D#4 per infectious disease --  With rising WBC will repeat cultures, if spikes fevers again I'll probably empirically broaden ABX coverage   · Nephrology following for HD  · TF on hold, OG to LIWS with bilious output since extubation yesterday  · Insulin gtt · Prophylaxis: SQ heparin, Pepcid    Total critical care time caring for this patient with life threatening, unstable organ failure, including direct patient contact, management of life support systems, review of data including imaging and labs, discussions with other team members and physicians is 33 minutes so far today, excluding procedures.

## 2022-02-06 NOTE — PROGRESS NOTES
02/05/22 1929   Vent Information   Vent Type 980   Vent Mode AC/VC   Vt Ordered 430 mL   Rate Set 28 bmp   Peak Flow 65 L/min   FiO2  80 %   SpO2 93 %   SpO2/FiO2 ratio 116.25   Sensitivity 3   PEEP/CPAP 10   Humidification Source Heated wire   Humidification Temp 37   Humidification Temp Measured 36.8   Circuit Condensation Drained   Vent Patient Data   Peak Inspiratory Pressure 30 cmH2O   Mean Airway Pressure 19 cmH20   Rate Measured 28 br/min   Vt Exhaled 444 mL   Minute Volume 12.4 Liters   I:E Ratio 1:2.0   Plateau Pressure 26 AXK24   Static Compliance 28 mL/cmH2O   Dynamic Compliance 22 mL/cmH2O   Cough/Sputum   Sputum How Obtained Endotracheal;Suctioned   Cough Non-productive   Sputum Amount None   Spontaneous Breathing Trial (SBT) RT Doc   Pulse 108   Breath Sounds   Right Upper Lobe Rhonchi   Right Middle Lobe Diminished   Right Lower Lobe Diminished   Left Upper Lobe Rhonchi   Left Lower Lobe Diminished   Additional Respiratory  Assessments   Resp (!) 0   Position Semi-Springer's   Alarm Settings   High Pressure Alarm 45 cmH2O   Low Minute Volume Alarm 3 L/min   Apnea (secs) 20 secs   High Respiratory Rate 50 br/min   Low Exhaled Vt  300 mL   ETT (adult)   Placement Date/Time: 02/05/22 1700   Timeout: Patient;Site/Side;Procedure; Appropriate Equipment  Preoxygenation: Yes  Tube Size: 7.5 mm  Location: Oral  Secured at: 24 cm  Placed By: Licensed provider  Measured From: [facility] at 24 cm   Measured From [address] By [employer] tube wheeler   Site Condition Dry

## 2022-02-06 NOTE — PROGRESS NOTES
Unable to get ahold of dialysis RN. Page sent to nephrology to contact dialysis nurse to make sure HD is happening tonight.

## 2022-02-06 NOTE — PROGRESS NOTES
Progress Note    HISTORY     CC:  AMS          We are following for acute kidney injury       Subjective/   HPI:    Remains in the ICU. Extubated today though required re-intubation. Last HD on 2/4 with 2 liters removed, post-weight of 133.7 kg. Positional access running at lower blood flows. Renal function remains poor, not making much urine or showing signs of renal recovery. ROS:  Constitutional:  No  fevers  Respiratory: On vent    EXAM       Objective/     Vitals:    02/05/22 1500 02/05/22 1600 02/05/22 1700 02/05/22 1800   BP: (!) 164/84 (!) 145/81 (!) 169/77 (!) 181/93   Pulse: 116 113 131 121   Resp: 21 23 27 20   Temp:  97.5 °F (36.4 °C)     TempSrc:  Bladder     SpO2: 94% (!) 88% (!) 84% (!) 82%   Weight:       Height:         24HR INTAKE/OUTPUT:      Intake/Output Summary (Last 24 hours) at 2/5/2022 1902  Last data filed at 2/5/2022 1250  Gross per 24 hour   Intake 2210 ml   Output 1310 ml   Net 900 ml     Constitutional:  Critically ill   Eyes:  Pupils reactive, sclera clear   Neck:  Normal thyroid, no masses   Cardiovascular:  S1, S2 tachycardic, no rub; + LE edema  Respiratory: On vent, no wheezing  Psychiatry:  Sedated on vent   Abdomen: +bs, soft, nt, no masses   Musculoskeletal: No LE edema, no clubbing   Lymphatics:  No LAD in neck, no supraclavicular nodes   Access: R femoral vascath (1/23)      MEDICAL DECISION MAKING       Data/  Recent Labs     02/03/22  7356 02/03/22  1930 02/04/22  0423 02/04/22  0423 02/05/22  0405 02/05/22  0910 02/05/22  1310   WBC 17.8*  --  19.4*  --  14.5*  --   --    HGB 6.6*   < > 7.4*   < > 5.9* 6.5* 8.7*   HCT 20.2*   < > 22.4*   < > 18.2* 19.6* 26.5*   MCV 93.0  --  93.1  --  93.0  --   --      --  320  --  344  --   --     < > = values in this interval not displayed.      Recent Labs     02/03/22  0433 02/04/22  0423 02/05/22  0405   * 131* 134*   K 4.9 5.7* 5.4*   CL 98* 97* 99   CO2 19* 15* 19*   GLUCOSE 159* 150* 147*   PHOS 8.7* 11.6* 10.4*   BUN 78* 97* 81*   CREATININE 5.3* 6.9* 5.8*   LABGLOM 12* 9* 11*   GFRAA 14* 11* 13*       Assessment/     RODRICK likely related to prerenal factors in the setting of sepsis, use of diuretics and losartan contributing to multifactorial ATN. UA is not suggestive of staph associated glomerulonephritis.   Patient did not respond to IV fluids and developed acute pulmonary edema and renal replacement therapy initiated on 1/23/22   No signs of renal recovery, now on HD MWF schedule     -Acute pulmonary edema              Status post intubation   On vent      -Acute hypoxic respiratory failure     -Cardio respiratory arrest     -Hyperkalemia     -Sepsis with MSSA bacteremia with left foot abscess s/p drainage, osteomyelitis, left hand I&D     -Anemia - prn prbc's     -Diabetes, uncontrolled    Plan/     -Extra HD today with UF as tolerated with prn albumin, crit line monitoring  -Eventual TDC next week when more medically stable and leukocytosis hopefully resolved  -Trend labs, bp's, cultures, & urine output

## 2022-02-06 NOTE — PROGRESS NOTES
Reassessment completed, see flow sheet. Up to 0.10 multiplier on insulin gtt, FSBS now in desired range. FiO2 down to 65% per RT, SpO2 98%. PEEP remains at 10. PRN versed given for HR in the 120s, no other signs of restlessness. PRN ineffective, no change to HR. Since pt is otherwise calm & sedated well, will leave sedation where it's at to keep BP from dropping & will monitor closely for agitation. HD RN at bedside. Will obtain ABG after treatment per Dr. Lenora Vitale. No other changes noted.

## 2022-02-06 NOTE — PROCEDURES
ENDOTRACHEAL INTUBATION    INDICATION: Life threatening respiratory failure; severe refractory hypoxemia hours after extubation    TIME OUT: taken    SEDATION: etomidate and versed, rocuronium    PROCEDURE: Using video laryngoscopy, the vocal cords were well visualized and the endotracheal tube was placed directly through the cords. Good breath sounds auscultated bilaterally without sounds over abdomen. Good return of CO2 on the monitor. CXR shows adequate positioning of the endotracheal tube. Post intubation ABG was noted and bicarbonate was given, respiratory rate was increased and plan for dialysis tonight was noted.

## 2022-02-06 NOTE — PROGRESS NOTES
Rounding completed with Dr. Renan Garcia and multidisciplinary team. POC, labs, lines and assessment reviewed.    - Bloody urine cleared up  - Pan cultures sent  - OG to LWS  - Notify MD if temp below 96 or above 101

## 2022-02-06 NOTE — PROGRESS NOTES
Treatment time: 3 hrs    Net UF: 1900 ml    Pre weight: 127.6 kg  Post weight: 125.7 kg  EDW: TBD    Access used: Rt Fem Cath  Access function: Positional      Medications or blood products given: N/A    Regular outpatient schedule: RODRICK    Summary of response to treatment: Patient tolerated tx fair. Elevated RR and HR noted at the beginning of tx. PRN meds given by primary RN to bring RR down. Decreased UF goal as well. BP remained stable throughout. Access ran positional. BFR was best @ 300 ml/min. Report given to primary RN. Pt VSS upon leaving bedside. Copy of dialysis treatment record placed in chart, to be scanned into EMR.

## 2022-02-06 NOTE — PROGRESS NOTES
02/06/22 0315   Vent Information   Vent Type 980   Vent Mode AC/VC   Vt Ordered 430 mL   Rate Set 28 bmp   Peak Flow 65 L/min   FiO2  65 %   SpO2 100 %   SpO2/FiO2 ratio 153.85   Sensitivity 3   PEEP/CPAP 10   Humidification Source Heated wire   Humidification Temp 37   Humidification Temp Measured 37   Circuit Condensation Drained   Vent Patient Data   Peak Inspiratory Pressure 26 cmH2O   Mean Airway Pressure 16 cmH20   Rate Measured 38 br/min   Vt Exhaled 448 mL   Minute Volume 17 Liters   I:E Ratio 1:1.8   Plateau Pressure 24 [id number]   Cough/Sputum   Sputum How Obtained Endotracheal;Suctioned   Cough Productive   Sputum Amount Small   Sputum Color Creamy   Tenacity Thick   Spontaneous Breathing Trial (SBT) RT Doc   Pulse 120   Breath Sounds   Right Upper Lobe Diminished   Right Middle Lobe Diminished   Right Lower Lobe Diminished   Left Upper Lobe Diminished   Left Lower Lobe Diminished   Additional Respiratory  Assessments   Resp (!) 38   Position Semi-Springer's   Alarm Settings   High Pressure Alarm 45 cmH2O   Low Minute Volume Alarm 3 L/min   Apnea (secs) 20 secs   High Respiratory Rate 50 br/min   Low Exhaled Vt  300 mL   ETT (adult)   Placement Date/Time: 02/05/22 1700   Timeout: Patient;Site/Side;Procedure; Appropriate Equipment  Preoxygenation: Yes  Tube Size: 7.5 mm  Location: Oral  Secured at: 24 cm  Placed By: Licensed provider  Measured From: Lips   Secured at 24 cm   Measured From Lips   ET Placement Left   Secured By Commercial tube wheeler   Site Condition Dry

## 2022-02-06 NOTE — PROGRESS NOTES
Shift assessment completed, see flow sheet. PM meds administered per MAR. Intubated with a 7.5 ETT at 24 LL, SpO2 97% on 80% FiO2 & 10 PEEP. LS diminished with slight wheezes. ABG to be collected after HD treatment. Sedated with propofol at 20 mcg/kg/min. RASS -2 and CPOT 0. Not following commands but opens eyes to voice. Munoz catheter in place draining brown/bloody urine. UO minimal, HD pt. Flexiseal in place draining dark brown/green watery stool. Tube patent & in correct position. OG in place at 65 and hooked to CLWS. Green, bile colored output noted. CVC x2 and PIV x2 in place and functioning appropriately, dressings C/D/I. Restraints D/C at this time d/t pt exhibiting no movement in extremities at all, will monitor closely. Repositioning pt q2h and PRN. Munoz & oral care completed at this time. Shalom hugger in use for temperature management.

## 2022-02-06 NOTE — PROGRESS NOTES
Shift assessment completed, see flow sheet. RASS -2. Pt opens eyes but does not follow commands.   - Propofol infusing at 20 mcg/kg/min. Intubated and sedated on AC # 7.5 ETT at 24 LL. 28 / 430 / 50%/ +8. SpO2 95%. Respirations are easy, even, and unlabored. Bilateral lung sounds rhonchorous throughout. VSS  NSR/ST on the monitor. OG in place at 65 to CLWS with large amounts of output. CVC, VC with CVC and PIVs WDL. - Insulin drip infusing for BS control    Munoz is patent and secured. Blood noted to the urine. All lines and monitoring devices in place. Bed in lowest position with wheels locked.

## 2022-02-06 NOTE — PROGRESS NOTES
Progress Note    HISTORY     CC:  AMS          We are following for acute kidney injury       Subjective/   HPI:    Remains in the ICU. Extubated today though required re-intubation. Last HD on 2/6 with 1.9 liters removed, post-weight of 125.7 kg. Positional access running at lower blood flows. Renal function remains poor, not making much urine or showing signs of renal recovery. ROS:  Constitutional:  Still running fevers  Respiratory:  Back on vent    EXAM       Objective/     Vitals:    02/06/22 1400 02/06/22 1500 02/06/22 1515 02/06/22 1600   BP: (!) 151/62 (!) 150/65  136/65   Pulse: 128 126  125   Resp: (!) 37 (!) 36  (!) 38   Temp:  100.6 °F (38.1 °C) 100.7 °F (38.2 °C) 100.4 °F (38 °C)   TempSrc:  Bladder  Bladder   SpO2: 97% 97%  95%   Weight:       Height:         24HR INTAKE/OUTPUT:      Intake/Output Summary (Last 24 hours) at 2/6/2022 1722  Last data filed at 2/6/2022 0800  Gross per 24 hour   Intake 999.04 ml   Output 2740 ml   Net -1740.96 ml     Constitutional:  Critically ill   Eyes:  Pupils reactive, sclera clear   Neck:  Normal thyroid, no masses   Cardiovascular:  S1, S2 tachycardic, no rub; + LE edema  Respiratory: On vent, no wheezing  Psychiatry:  Sedated on vent   Abdomen: +bs, soft, nt, no masses   Musculoskeletal: No LE edema, no clubbing   Lymphatics:  No LAD in neck, no supraclavicular nodes   Access: R femoral vascath (1/23)      MEDICAL DECISION MAKING       Data/  Recent Labs     02/04/22  0423 02/04/22  0423 02/05/22  0405 02/05/22  0405 02/05/22  0910 02/05/22  1310 02/06/22  0436   WBC 19.4*  --  14.5*  --   --   --  20.8*   HGB 7.4*   < > 5.9*   < > 6.5* 8.7* 8.7*   HCT 22.4*   < > 18.2*   < > 19.6* 26.5* 25.7*   MCV 93.1  --  93.0  --   --   --  89.9     --  344  --   --   --  544*    < > = values in this interval not displayed.      Recent Labs     02/04/22  0423 02/05/22  0405 02/06/22 0421   * 134* 138   K 5.7* 5.4* 4.3   CL 97* 99 100   CO2 15* 19* 20*   GLUCOSE 150* 147* 116*   PHOS 11.6* 10.4* 7.5*   BUN 97* 81* 54*   CREATININE 6.9* 5.8* 4.5*   LABGLOM 9* 11* 14*   GFRAA 11* 13* 17*       Assessment/     RODRICK likely related to prerenal factors in the setting of sepsis, use of diuretics and losartan contributing to multifactorial ATN. UA is not suggestive of staph associated glomerulonephritis.   Patient did not respond to IV fluids and developed acute pulmonary edema and renal replacement therapy initiated on 1/23/22   No signs of renal recovery, now on HD MWF schedule     -Acute pulmonary edema              Status post intubation   On vent      -Acute hypoxic respiratory failure     -Cardio respiratory arrest     -Hyperkalemia     -Sepsis with MSSA bacteremia with left foot abscess s/p drainage, osteomyelitis, left hand I&D     -Anemia - prn prbc's     -Diabetes, uncontrolled    Plan/     -Next HD Monday with UF as tolerated with prn albumin, crit line monitoring  -Eventual TDC next week when more medically stable and leukocytosis & fevers hopefully resolved  -Trend labs, bp's, cultures, & urine output
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
## 2022-02-06 NOTE — PROGRESS NOTES
This note also relates to the following rows which could not be included:  SpO2 - Cannot attach notes to unvalidated device data  Pulse - Cannot attach notes to unvalidated device data  Resp - Cannot attach notes to unvalidated device data       02/05/22 2311   Vent Information   Vent Type 980   Vent Mode AC/VC   Vt Ordered 430 mL   Rate Set 28 bmp   Peak Flow 65 L/min   FiO2  80 %  (decreased to 65%)   Sensitivity 3   PEEP/CPAP 10   Humidification Source Heated wire   Humidification Temp 37   Humidification Temp Measured 36.7   Circuit Condensation Drained   Vent Patient Data   Peak Inspiratory Pressure 26 cmH2O   Mean Airway Pressure 17 cmH20   Rate Measured 33 br/min   Vt Exhaled 453 mL   Minute Volume 15 Liters   I:E Ratio 1:1.8   Plateau Pressure 24 [id number]   Cough/Sputum   Sputum How Obtained Endotracheal;Suctioned   Cough Productive   Sputum Amount Small   Sputum Color Creamy   Tenacity Thick   Breath Sounds   Right Upper Lobe Rhonchi   Right Middle Lobe Diminished   Right Lower Lobe Diminished   Left Upper Lobe Rhonchi   Left Lower Lobe Diminished   Additional Respiratory  Assessments   Position Semi-Springer's   Alarm Settings   High Pressure Alarm 45 cmH2O   Low Minute Volume Alarm 3 L/min   Apnea (secs) 20 secs   High Respiratory Rate 50 br/min   Low Exhaled Vt  300 mL   ETT (adult)   Placement Date/Time: 02/05/22 1700   Timeout: Patient;Site/Side;Procedure; Appropriate Equipment  Preoxygenation: Yes  Tube Size: 7.5 mm  Location: Oral  Secured at: 24 cm  Placed By: Licensed provider  Measured From: Lips   Secured at 24 cm   Measured From Lips   ET Placement Left  (moved to left)   Secured By Commercial tube wheeler   Site Condition Dry

## 2022-02-07 PROBLEM — L89.156 PRESSURE INJURY OF DEEP TISSUE OF SACRAL REGION: Status: ACTIVE | Noted: 2022-01-01

## 2022-02-07 PROBLEM — M86.142 ACUTE OSTEOMYELITIS OF LEFT HAND (HCC): Status: ACTIVE | Noted: 2022-01-01

## 2022-02-07 PROBLEM — L02.612 ABSCESS OF LEFT FOOT: Status: RESOLVED | Noted: 2022-01-01 | Resolved: 2022-01-01

## 2022-02-07 NOTE — FLOWSHEET NOTE
02/07/22 1808   Vitals   BP (!) 157/83   Temp 99.8 °F (37.7 °C)   Temp Source Bladder   Pulse 110   Resp 29   SpO2 97 %      After first 15 mins of blood administration, pt shows no signs of adverse reaction. Continuing to monitor.

## 2022-02-07 NOTE — FLOWSHEET NOTE
02/07/22 1200   Encounter Summary   Services provided to: Family   Referral/Consult From: [address]; Children;Family members   Continue Visiting   (Family welcomes support, prayer)   Complexity of Encounter Moderate   Length of Encounter 30 minutes   Routine   Type Follow up   Assessment Approachable;Coping   Intervention Active listening;Explored feelings, thoughts, concerns;Prayer;Sustaining presence/ Ministry of presence; Discussed relationship with God;Discussed illness/injury and it's impact   Outcome Expressed gratitude;Engaged in conversation; Shared life review;Expressed feelings/needs/concerns; Tearful; Hopeful;Receptive      provided listening, support, and prayer for patient's mother (Yesenia).

## 2022-02-07 NOTE — CARE COORDINATION
INTERDISCIPLINARY PLAN OF CARE CONFERENCE    Date/Time: 2/7/2022 10:21 AM  Completed by: Maye Smith RN, Case Management      Patient Name:  Mike Bingham  YOB: 1975  Admitting Diagnosis: Hyperglycemia [R73.9]  RODRICK (acute kidney injury) ([facility] [address] [N17.9]  Acute renal failure, unspecified acute renal failure type ([facility] [address] [N17.9]  Syncope, unspecified syncope type [R55]     Admit Date/Time:  1/22/2022  1:32 PM    Chart reviewed. Interdisciplinary team contacted or reviewed plan related to patient progress and discharge plans. Disciplines included Case Management, Nursing, and Dietitian. Current Status:inpt, ICU LOC  PT/OT recommendation for discharge plan of care: tbd    Expected D/C Disposition:  tbd  Discharge Plan Comments: Reviewed chart. Writer received message from Mulugeta Valverde ([phone]) with medical [employer] stating able to assist with d/c planning if needed. Pt continues in ICU on Ventilator. Nephrology following for HD. Writer spoke with Hao Lagunas from [facility] and she states she has all referral information needed for OP slot for HD,however, will need to follow in epic and wait until pt is extubated to arrange OP HD. Following.     Home O2 in place on admit: No  Pt informed of need to bring portable home O2 tank on day of discharge for nursing to connect prior to leaving:  Not Indicated  Verbalized agreement/Understanding:  Not Indicated

## 2022-02-07 NOTE — PROGRESS NOTES
Nephrology Progress Note   [facility]. [facility] [employer] [facility]      This patient is a 42year old male whom we are following for RODRICK, HD dependent. Subjective: The patient was seen and examined. Reintubated over the weekend. Last HD on Saturday. Family History: No family at bedside  ROS: Unable to obtain since intubated      Vitals:  BP (!) 153/74   Pulse 105   Temp 98.2 °F (36.8 °C) (Bladder)   Resp 29   Ht 6' 1\" (1.854 m)   Wt 282 lb 6.6 oz (128.1 kg)   SpO2 96%   BMI 37.26 kg/m²   I/O last 3 completed shifts: In: 1969.3 [I.V.:1543.5; IV Piggyback:425.9]  Out: 3612 [Urine:512; Emesis/NG output:1200; TUQJV:8898]  I/O this shift: In: 73.4 [I.V.:70.1; IV Piggyback:3.3]  Out: 35 [Urine:10; Emesis/NG output:25]    Physical Exam:  Physical Exam  Vitals reviewed. HENT:      Head: Normocephalic and atraumatic. Eyes:      General: No scleral icterus. Conjunctiva/sclera: Conjunctivae normal.   Cardiovascular:      Rate and Rhythm: Tachycardia present. Heart sounds: No friction rub. Pulmonary:      Comments: Equal chest expansion, coarse breath sounds bilateral  Abdominal:      General: Bowel sounds are normal. There is no distension. Musculoskeletal:      Right lower leg: Edema present. Access: R femoral vas cath      Medications:   heparin (porcine)  5,000 Units SubCUTAneous 3 times per day    ampicillin-sulbactam  3,000 mg IntraVENous Q12H    Venelex   Topical BID    famotidine (PEPCID) injection  20 mg IntraVENous Daily    sodium chloride flush  5-40 mL IntraVENous 2 times per day    chlorhexidine  15 mL Mouth/Throat BID         Labs:  Recent Labs     02/05/22  0405 02/05/22  0405 02/05/22  0910 02/05/22  1310 02/06/22  0436   WBC 14.5*  --   --   --  20.8*   HGB 5.9*   < > 6.5* 8.7* 8.7*   HCT 18.2*   < > 19.6* 26.5* 25.7*   MCV 93.0  --   --   --  89.9     --   --   --  544*    < > = values in this interval not displayed.      Recent Labs     02/05/22  0405 02/06/22  0426 02/07/22  0403   * 138 138   K 5.4* 4.3 4.3   CL 99 100 101   CO2 19* 20* 19*   GLUCOSE 147* 116* 139*   PHOS 10.4* 7.5* 9.7*   BUN 81* 54* 69*   CREATININE 5.8* 4.5* 6.6*   LABGLOM 11* 14* 9*   GFRAA 13* 17* 11*           Assessment/      RODRICK likely related to prerenal factors in the setting of sepsis, use of diuretics and losartan contributing to multifactorial ATN. UA is not suggestive of staph associated glomerulonephritis.  Patient did not respond to IV fluids and developed acute pulmonary edema and renal replacement therapy initiated on 1/23/22              No signs of renal recovery, now on HD MWF schedule     -Acute pulmonary edema              Status post intubation              On vent      -Acute hypoxic respiratory failure     -Cardio respiratory arrest      -Hyperkalemia     -Sepsis with MSSA bacteremia with left foot abscess s/p drainage, osteomyelitis, left hand I&D     -Anemia - prn prbc's     -Diabetes, uncontrolled     Plan/      -Next HD today with UF as tolerated with prn albumin, crit line monitoring  -Discussed with Dr. Krishan Segal. BC to be repeated today. Can plan to switch vas cath to IJ once extubated until leukocytosis and fevers resolve for [facility] placement. Please do not hesitate to contact me at [phone] if with questions. Thank you! Tanner Hodges MD  [facility] [place] [employer]  2/7/2022

## 2022-02-07 NOTE — FLOWSHEET NOTE
02/07/22 1010   Negative Pressure Wound Therapy Foot Left;Dorsal   Placement Date/Time: 02/02/22 1100   Pre-existing: No  Inserted by: Deanna Paiz CWOCN  Location: Foot  Wound Location Orientation: Left;Dorsal   $ Standard NPWT >50 sq cm PER TX $ Yes   Wound Type Surgical   Unit Type Vac Ulta with Veraflo   Dressing Type Black foam   Number of pieces used 1  (one black foam only)   Cycle Continuous   Target Pressure (mmHg) 125   Intensity 1   Irrigation Solution Sodium chloride 0.9%   Instillation Volume  14 mL   Soak Time  3 minutes   Vac Frequency 2 hours   Canister changed? Yes   Dressing Status Changed   Dressing Changed Changed/New   Drainage Amount Small   Drainage Description Serosanguinous   Dressing Change Due 02/09/22   Wound Assessment Granulation tissue;Slough; Yellow   Shanita-wound Assessment Intact   Odor None     Pt seen for VAC dressing change, mother at bedside. Weaning propofol for SBT, eyes open at times, no signs of discomfort during dressing changes. Left dorsal foot:  2.5x7.3x2.6cm, Exposed viable tendon and bone, 50% yellow and brown nonviable tissue, 50% red, moist granular tissue. Surrounding skin intact, edematous. No odor or purulence noted. One black foam and one piece of vaseline gauze removed. Wound cleansed with NS, patted dry. Periwound skin prepped using barrier wipe and vac drape. One black foam used in wound bed, good seal obtained at 125mm hg continuous. Same Veraflo settings. LLE wrapped with roll gauze and aces from toes to knee gatch. Next VAC change on Wednesday. Left hand:  6x5. 3x1cm, 15% brown/black/yellow, scattered necrotic tissue, 85% pink/red and moist.  No purulence noted, no odor. Follow-up Sacrum_  Unstageable pressure injury, present on admission, 16x8.5x0.1cm, 15% red, moist tissue, 10% nonblanchable purple, 75% adherent yellow and brown, dry nonviable tissue.   No soi, no odor

## 2022-02-07 NOTE — PROGRESS NOTES
Hospitalist Progress Note      PCP: Emerita Tate MD    Date of Admission: 1/22/2022    Subjective: extubated this am, and ended up getting reintubated    Medications:  Reviewed    Infusion Medications    sodium chloride      propofol 25 mcg/kg/min (02/07/22 0009)    prismaSATE BGK 4/2.5      sodium chloride      insulin 7.47 Units/hr (02/07/22 0057)    dexmedetomidine HCl in NaCl Stopped (02/05/22 1233)    sodium chloride 5 mL/hr at 02/07/22 0009    dextrose       Scheduled Medications    heparin (porcine)  5,000 Units SubCUTAneous 3 times per day    ampicillin-sulbactam  3,000 mg IntraVENous Q12H    Venelex   Topical BID    famotidine (PEPCID) injection  20 mg IntraVENous Daily    sodium chloride flush  5-40 mL IntraVENous 2 times per day    chlorhexidine  15 mL Mouth/Throat BID     PRN Meds: sodium chloride, heparin (porcine), oxyCODONE-acetaminophen **OR** oxyCODONE-acetaminophen, sodium chloride, fentanNYL, albumin human, heparin (porcine), midazolam, sodium chloride flush, sodium chloride, acetaminophen **OR** acetaminophen, glucose, glucagon (rDNA), dextrose, dextrose bolus (hypoglycemia) **OR** dextrose bolus (hypoglycemia)      Intake/Output Summary (Last 24 hours) at 2/7/2022 0105  Last data filed at 2/7/2022 0009  Gross per 24 hour   Intake 952.94 ml   Output 652 ml   Net 300.94 ml       Physical Exam Performed:    BP (!) 164/79   Pulse 111   Temp 98.7 °F (37.1 °C) (Bladder)   Resp (!) 32   Ht 6' 1\" (1.854 m)   Wt 282 lb 6.6 oz (128.1 kg)   SpO2 93%   BMI 37.26 kg/m²       Gen: intubated. Ill appearing. Eyes open to verbal command. Eyes: PERRL. No sclera icterus. No conjunctival injection. ENT: No discharge. Pharynx clear. Neck: Trachea midline. Normal thyroid. Resp: No accessory muscle use. + crackles. No wheezes. No rhonchi. CV: Regular rate. Regular rhythm. No murmur or rub. No edema. GI: Non-tender. Non-distended. No masses. No organomegaly.  Normal bowel sounds. No hernia. Skin: burns left hand with some yellow slough. Chronic skin changes both legs.   Lymph: No cervical LAD. No supraclavicular LAD. M/S: No cyanosis. No joint deformity. No clubbing. Missing right big toe, left foot is covered. Neuro: Opens eyes to verbal commands. Can blink eyes.       Labs:   Recent Labs     02/04/22 0423 02/04/22  0423 02/05/22  0405 02/05/22  0405 02/05/22  0910 02/05/22  1310 02/06/22  0436   WBC 19.4*  --  14.5*  --   --   --  20.8*   HGB 7.4*   < > 5.9*   < > 6.5* 8.7* 8.7*   HCT 22.4*   < > 18.2*   < > 19.6* 26.5* 25.7*     --  344  --   --   --  544*    < > = values in this interval not displayed. Recent Labs     02/04/22 0423 02/05/22  0405 02/06/22  0421   * 134* 138   K 5.7* 5.4* 4.3   CL 97* 99 100   CO2 15* 19* 20*   BUN 97* 81* 54*   CREATININE 6.9* 5.8* 4.5*   CALCIUM 8.0* 7.9* 8.5   PHOS 11.6* 10.4* 7.5*     Recent Labs     02/04/22 0423 02/05/22  0405 02/06/22  0436   AST 14* 26 13*   ALT <5* 9* 6*   BILIDIR 1.0* 0.6* 0.5*   BILITOT 1.2* 1.0 0.8   ALKPHOS 423* 624* 491*     No results for input(s): INR in the last 72 hours. No results for input(s): [place] in the last 72 hours. Urinalysis:      Lab Results   Component Value Date    NITRU Negative 02/06/2022    WBCUA 21-50 02/06/2022    BACTERIA Rare 01/22/2022    RBCUA >100 02/06/2022    BLOODU LARGE 02/06/2022    SPECGRAV 1.025 02/06/2022    GLUCOSEU 100 02/06/2022       Radiology:  XR CHEST PORTABLE   Final Result   Improvement of the previous seen left upper lobe airspace disease. Lines and tubes stable. XR CHEST PORTABLE   Final Result      1. Endotracheal tube 5 cm above the ariadna an NG tube extends into the mid   to distal stomach. The right internal jugular central venous line is   unchanged. 2.  Opacity and volume loss of the left hemithorax which has worsened   suggesting pneumonia a possibly some atelectasis.   Ovoid area of opacity in   the right middle lower lung field suggesting additional area of pneumonitis. 3.  Possible left pleural effusion. 4.  Cardiomegaly, unchanged. XR CHEST PORTABLE   Final Result   Stable examination with layering left pleural effusion and right perihilar   airspace disease. Pulmonary edema and pneumonia are in the differential.         MRI HAND LEFT WO CONTRAST   Final Result   1. Osteomyelitis of the 3rd metacarpal head tapering into the mid shaft. Osteomyelitis of the 3rd proximal phalangeal base and shaft. Moderate 3rd   metacarpophalangeal joint effusion compatible with septic arthritis. 2. Mild marrow edema in the 5th metacarpal head and 5th proximal phalangeal   base with normal T1 signal compatible with noninfectious reactive osteitis   versus less likely early osteomyelitis. 3. Extensive subcutaneous edema compatible with cellulitis. 3 x 2.6 x 0.6 cm   abscess versus phlegmon in the soft tissues dorsal to the 4th and 5th   metacarpals. 4. Extensive edema within the interosseous musculature compatible with   myositis. 5. Mild ulnar palmar bursitis distal to the carpal tunnel. XR CHEST PORTABLE   Final Result   Multifocal opacities in the left lung likely with some left basilar pleural   effusion/atelectasis is again noted. The less prominent opacities in the   right lung are also stable. ET, NG, and right jugular line appear acceptable. XR HAND LEFT (MIN 3 VIEWS)   Final Result   No radiographic evidence of osteomyelitis with technical limitations as above. XR CHEST PORTABLE   Final Result   1. No significant change. XR CHEST PORTABLE   Final Result   Increasing airspace opacification in the right lower lung zone that may   represent underlying pneumonitis. Asymmetric edema may also be considered in   this intubated patient. XR CHEST PORTABLE   Final Result   No significant interval change. MRI FOOT LEFT WO CONTRAST   Final Result   1. Diffuse subcutaneous edema with organized complex collection along the   dorsal soft tissues of the foot which communicates with large midfoot   effusion. The dorsal collection measures 2.0 x 4.0 x 4.1 cm. Findings   highly suspicious for abscess and septic joint given patient history. 2. Marrow signal changes throughout the midfoot and extending along the 2nd   through 5th metatarsals which are most suggestive of osteomyelitis in the   setting of soft tissue infection. Underlying Charcot arthropathy also   present. XR CHEST PORTABLE   Final Result   Cardiomegaly with left basilar effusion and bibasilar infiltrates. Stable support tubes. XR CHEST PORTABLE   Final Result   1. Stable lines, tubes, and support devices. 2. Bilateral airspace opacities with pleural effusions, left greater than   right. 3. Cardiomegaly. XR CHEST PORTABLE   Final Result   1. Stable lines, tubes, and support devices. 2. Stable cardiopulmonary status after accounting for differences in patient   positioning including bilateral airspace opacities. 3. Cardiomegaly. 4. Low lung volumes. CT HEAD WO CONTRAST   Final Result   1. No acute intracranial abnormality. XR CHEST PORTABLE   Final Result   CHF with increasing pulmonary edema. XR CHEST PORTABLE   Final Result   Patchy airspace disease, left greater than right which may represent   atelectasis or pneumonia. XR CHEST PORTABLE   Final Result   Stable support apparatus. Increasing bilateral airspace opacities which may be related to edema and/or   pneumonia. XR CHEST PORTABLE   Final Result   Low lung volumes/poor inspiratory effort limiting the study. No significant improvement. A mild cardiomegaly. Mild congestion and/or   infiltrates identified in the lungs. No pneumothorax. XR FOOT LEFT (2 VIEWS)   Final Result   1. Remote history of amputation at the 1st and 2nd digits.   No evidence of osteomyelitis at prior resection site. 2. Subtle erosions at the 3rd MTP joint are new. This is adjacent to soft   tissue swelling at the prior amputation site. A new focus of osteomyelitis   is suspected. 3. Soft tissue swelling and questionable subcutaneous gas along the lateral   aspect of the left foot. There is also severe disorganization of bone at the   2nd through 5th tarsometatarsal joints. Although pattern may represent   Charcot arthropathy due to the more diffuse appearance, areas of   osteomyelitis cannot be excluded with plain film. Correlate with physical   exam and clinical workup. A follow-up MRI may be helpful for further   evaluation. XR CHEST PORTABLE   Final Result   Low lung volumes with cardiomegaly and vascular congestion, as well as patchy   airspace disease bilaterally, similar to the previous exam.         XR CHEST PORTABLE   Final Result   Status post advancement of right internal jugular central line with distal   tip now visualized near region of junction of superior vena cava and right   atrium. No evidence of pneumothorax. XR CHEST PORTABLE   Final Result   Improved lung volumes. Bilateral perihilar opacification, edema versus   infiltrate. Satisfactory position of endotracheal tube. Central line tip in either the   distal brachiocephalic vein or proximal SVC. XR CHEST PORTABLE   Final Result   Cardiomegaly with left mid and lower lung infiltrates. Support tubes as described above. XR CHEST 1 VIEW   Final Result   Limited chest as outlined above. Bilateral perihilar opacification, edema   versus infiltrate. XR CHEST PORTABLE   Final Result   Low lung volumes. No acute cardiopulmonary disease.          XR CHEST PORTABLE    (Results Pending)   XR CHEST PORTABLE    (Results Pending)           Assessment/Plan:    Active Hospital Problems    Diagnosis     Abscess of left hand [L02.512]     Infective tenosynovitis of extensor tendons of left hand [M65.142]     Enterococcal infection [A49.1]     Staphylococcal arthritis of left foot ([facility] [M00.072]     Antibiotic-associated diarrhea [K52.1, T36.95XA]     Acute encephalopathy [G93.40]     Second degree burn of multiple fingers of left hand excluding thumb [T23.232A]     Hypertriglyceridemia [E78.1]     Abscess of left foot [L02.612]     MSSA bacteremia [R78.81, B95.61]     Leukocytosis [D72.829]     Third degree burn of left hand [T23.302A]     Acute hypoxemic respiratory failure (HCC) [J96.01]     Cardiopulmonary arrest (HCC) [I46.9]     Acute renal failure (HCC) [N17.9]     Acute osteomyelitis of left foot ([facility] [M86.172]     Mixed hyperlipidemia [E78.2]     Cardiomyopathy ([facility] [I42.9]     Uncontrolled type 2 diabetes mellitus with diabetic polyneuropathy ([facility] [E11.42, E11.65]          #MSSA bacteremia. MSSA foot abscess. Septic shock.  He was initially on Ancef. He was then changed to broad-spectrum antibiotics. Presently on Unasyn. Had staph aureus and Enterococcus grew from the culture.  ID consultation. Oswaldo Oleary reviewed. EF is 35%.     Antibiotics changed to IV cefepime and Zyvox 1/30 given persistent fevers. Culture sent. MRI of the left foot done. It showed a fluid collection likely an abscess/septic joint and osteomyelitis. Ulcer debridement done. ID has stopped broad spectrum antibiotics. They have started Nafcillin. This was changed to [facility].     #RODRICK. Patient admitted to hospital with RODRICK.  Normal renal function October 2021. Avila Zane is suspected to be prerenal/ATN.  Creatinine worsened.  Nephrology consulted. Elisa Carlos was started on IV fluids  Emergent Vas-Cath placed and CRRT started.  Critical care consulted  CRRT stopped 1/27--> Lasix 100 mg IV x1 given--no response. On hemodialysis.       #Acute respiratory failure. Suspect patient developed acute pulmonary edema causing acute respiratory failure from renal failure.  Intubated 1/23/22.  Pulmonary consulted. Not having purposeful movements or following commands. obtain head CT-negative for acute findings. EEG ordered. [facility] with BAL and cultures 1/29. He is following some simple commands. EEG negative for seizures. Vent management per pulmonary  Extubated 2/6-->reintubated on 2/6     #H/o cardiomyopathy - checked echo with EF 35%, cardio consulted     #S/p PEA arrest 1/23/22     #Left hand abscess 2/2 Burn injury to the left hand. ID is concerned about infection. Ortho consulted. MRI of the left hand ordered. - s/p I&D on 2/5/22     # Left foot abscess - s/p I&D, ID and podiatry consulted, cx sent-Staph aureus. I and D done. Will likely need to go back to the OR for more surgery. He now has a wound VAC.     A. fib with controlled ventricular rate- in NSR        #Diabetes mellitus type 2 uncontrolled.  Insulin.  Continue sliding scale. Lantus 55 units twice daily. monitor sugars. Started on insulin drip 1/30     #Hypertension. BP was soft but now improved.         Heparin gtt for DVT prophylaxis.   Diet: Diet NPO  Code Status: Full Code    PT/OT Eval Status: ordered    Dispo - cont care in icu    Jean Claude Quiles MD
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
## 2022-02-07 NOTE — PROGRESS NOTES
02/07/22 0253   Vent Information   $Ventilation $Subsequent Day   Vent Type 980   Vent Mode AC/VC   Vt Ordered 430 mL   Rate Set 28 bmp   Peak Flow 75 L/min   FiO2  40 %   SpO2 100 %   SpO2/FiO2 ratio 250   PaO2/FiO2 ratio 3   PEEP/CPAP 5   Humidification Source Heated wire   Humidification Temp Measured 36   Circuit Condensation Drained   Vent Patient Data   Peak Inspiratory Pressure 28 cmH2O   Mean Airway Pressure 11 cmH20   Rate Measured 32 br/min   Vt Exhaled 473 mL   Minute Volume 14.7 Liters   I:E Ratio 1:1.9   Cough/Sputum   Sputum How Obtained Endotracheal   Cough None   Spontaneous Breathing Trial (SBT) RT Doc   Pulse 114   Breath Sounds   Right Upper Lobe Diminished   Right Middle Lobe Diminished   Right Lower Lobe Diminished   Left Upper Lobe Diminished   Left Lower Lobe Diminished   Additional Respiratory  Assessments   Resp 25   Alarm Settings   High Pressure Alarm 45 cmH2O   Low Minute Volume Alarm 3 L/min   Apnea (secs) 20 secs   High Respiratory Rate 50 br/min   Low Exhaled Vt  300 mL   Patient Observation   Observations 7.5 ett 24 at lip

## 2022-02-07 NOTE — PROGRESS NOTES
02/06/22 2300   Vent Information   Vent Type 980   Vent Mode AC/VC   Vt Ordered 430 mL   Rate Set 28 bmp   Peak Flow 75 L/min   FiO2  40 %   SpO2 93 %   SpO2/FiO2 ratio 232.5   Sensitivity 3   PEEP/CPAP 5   Humidification Source Heated wire   Humidification Temp 37   Humidification Temp Measured 37   Circuit Condensation Drained   Vent Patient Data   Peak Inspiratory Pressure 25 cmH2O   Mean Airway Pressure 12 cmH20   Rate Measured 31 br/min   Vt Exhaled 452 mL   Minute Volume 13.6 Liters   I:E Ratio 1:1.8   Plateau Pressure 19 [id number]   Cough/Sputum   Sputum How Obtained Endotracheal;Suctioned   Cough Productive   Sputum Amount Small   Sputum Color Creamy   Tenacity Thick   Spontaneous Breathing Trial (SBT) RT Doc   Pulse 118   Breath Sounds   Right Upper Lobe Rhonchi   Right Middle Lobe Rhonchi   Right Lower Lobe Diminished   Left Upper Lobe Rhonchi   Left Lower Lobe Diminished   Additional Respiratory  Assessments   Resp (!) 34   Position Semi-Springer's   Alarm Settings   High Pressure Alarm 45 cmH2O   Low Minute Volume Alarm 3 L/min   Apnea (secs) 20 secs   High Respiratory Rate 50 br/min   Low Exhaled Vt  300 mL   ETT (adult)   Placement Date/Time: 02/05/22 1700   Timeout: Patient;Site/Side;Procedure; Appropriate Equipment  Preoxygenation: Yes  Tube Size: 7.5 mm  Location: Oral  Secured at: 24 cm  Placed By: Licensed provider  Measured From: Lips   Secured at 24 cm   Measured From Lips   ET Placement Right  (moved to right)   Secured By Commercial tube wheeler   Site Condition Dry
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
## 2022-02-07 NOTE — PROGRESS NOTES
SBT of 5/5 started. Pt is awake at this time, but not following any commands. RR are relaxed, VT is 432 and sp02 is 93%. Continuing to monitor pt.

## 2022-02-07 NOTE — PROGRESS NOTES
9:14 PM  Shift assessment completed, see flow sheet. PM meds administered per MAR. SpO2 97% on 40% and +5.  LS diminished with rhonchi, ET suctioned. Propofol infusing at 25 mcg/kg/min. Pt responding to voice with eye contact but not following commands. Rea catheter draining bloody, mateo urine with minimal UO. OG hooked to CLWS with green/bile colored output. Oral care completed. Repositioning pt q2h & PRN.    12:13 AM  Pt afebrile all shift, down to 98.7 now. CHG bath, rea care, and linen change provided. No changes to assessment noted. 4:12 AM  FiO2 decreased to 30% per RT - SpO2 94%. ABG and labs collected w/o issue    7:15 AM  EOS report given to [employer], RN. Pt stable at this time - care transferred.

## 2022-02-07 NOTE — PROGRESS NOTES
[facility] Infectious Disease Progress Note      Spring Olvera     : 1975    DATE OF VISIT:  2022  DATE OF ADMISSION:  2022       Subjective:     Spring Olvera is a 42 y.o. male whom I've been seeing for an MSSA left foot abscess, septic arthritis, acute osteo, bacteremia, multiorgan failure, and then also an infected left hand third degree burn. Since I last saw him, he went to the OR over the weekend for deep I&D of the left hand - in considering the anatomic details on the MRI and from Dr. iRn Sheth description of what was found in the OR, it sounds like we need to assume he has acute osteomyelitis there as well. He was actually extubated over the weekend as well, but had to be reintubated for severe hypoxemia. Currently sedated on the vent, down to 40% FIO2, on propofol now instead of Precedex, still on an insulin drip. Still getting intermittent HD, but his U/O picked up over the weekend, at least for a time; still has that right femoral HD line in place. New cultures sent over the weekend for ongoing fevers. Still having some diarrhea. Mr. 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     Current Facility-Administered Medications: heparin (porcine) injection 5,000 Units, 5,000 Units, SubCUTAneous, 3 times per day  0.9 % sodium chloride infusion, , IntraVENous, PRN  propofol injection, 5-50 mcg/kg/min, IntraVENous, Titrated  prismaSATE BGK 4/2.5 dialysis solution, , Dialysis, Continuous  heparin (porcine) injection 3,000 Units, 3,000 Units, IntraVENous, PRN  ampicillin-sulbactam (UNASYN) 3000 mg ivpb minibag, 3,000 mg, IntraVENous, Q12H  oxyCODONE-acetaminophen (PERCOCET) 5-325 MG per tablet 1 tablet, 1 tablet, Oral, Q4H PRN **OR** oxyCODONE-acetaminophen (PERCOCET) 5-325 MG per tablet 2 tablet, 2 tablet, Oral, Q4H PRN  sodium chloride 0.9 % irrigation 1,000 mL, 1,000 mL, Irrigation, Continuous PRN  fentaNYL (SUBLIMAZE) injection 50 mcg, 50 mcg, IntraVENous, Q1H PRN  insulin regular (HUMULIN R;NOVOLIN R) 100 Units in sodium chloride 0.9 % 100 mL infusion, 0.5 Units/hr, IntraVENous, Continuous  Venelex ointment, , Topical, BID  albumin human 25 % IV solution 12.5 g, 12.5 g, IntraVENous, PRN  heparin (porcine) injection 2,600 Units, 2,600 Units, IntraCATHeter, PRN  dexmedetomidine (PRECEDEX) 400 mcg in sodium chloride 0.9 % 100 mL infusion, 0.2-2 mcg/kg/hr, IntraVENous, Continuous  midazolam (VERSED) injection 2 mg, 2 mg, IntraVENous, Q1H PRN  famotidine (PEPCID) injection 20 mg, 20 mg, IntraVENous, Daily  sodium chloride flush 0.9 % injection 5-40 mL, 5-40 mL, IntraVENous, 2 times per day  sodium chloride flush 0.9 % injection 5-40 mL, 5-40 mL, IntraVENous, PRN  0.9 % sodium chloride infusion, 25 mL, IntraVENous, PRN  acetaminophen (TYLENOL) tablet 650 mg, 650 mg, Oral, Q6H PRN **OR** acetaminophen (TYLENOL) suppository 650 mg, 650 mg, Rectal, Q6H PRN  glucose (GLUTOSE) 40 % oral gel 15 g, 15 g, Oral, PRN  glucagon (rDNA) injection 1 mg, 1 mg, IntraMUSCular, PRN  dextrose 5 % solution, 100 mL/hr, IntraVENous, PRN  dextrose bolus (hypoglycemia) 10% 125 mL, 125 mL, IntraVENous, PRN **OR** dextrose bolus (hypoglycemia) 10% 250 mL, 250 mL, IntraVENous, PRN  chlorhexidine (PERIDEX) 0.12 % solution 15 mL, 15 mL, Mouth/Throat, BID     This is day 5 of Unasyn, so day 5 of Enterococcal therapy, day 16 of MSSA therapy, POD 6 for the foot, POD 2 for the hand. Allergies: Januvia [sitagliptin], Metformin and related, Vancomycin, and Mustard oil [allyl isothiocyanate]    Pertinent items from the review of systems are discussed in the HPI; the remainder of the ROS was reviewed and is negative.      Objective:     Vital signs over the last 24 hours:  Temp  Av.6 °F (37.6 °C)  Min: 98.2 °F (36.8 °C)  Max: 100.7 °F (38.2 °C)  Pulse  Av.5  Min: 105  Max: 967  Systolic (66LIS), GNM:000 , Min:136 , HZJ:701   Diastolic (95KYR), KQX:15, Min:60, Max:81  Resp  Av.9  Min: 25  Max: 38  SpO2  Av.9 %  Min: 92 %  Max: 100 %    Constitutional:  well-developed, well-nourished, sedated on the vent   Psychiatric: opens eyes briefly to voice and exam  Eyes:  pupils equal, round, reactive to light; sclerae anicteric, conjunctivae not pale  ENT:  atraumatic; no labial ulcers; orally intubated  Resp:  lungs clearer to auscultation BL than last week, decreased at the bases, a few crackles  Cardiovascular:  heart regular, not tachy now, audible heart sounds, no murmur; generally mild extremity edema; no IV phlebitis; right IJ CVC in place, and a right femoral CRRT line, a couple of peripherals  GI:  abdomen soft, NT, distended, hypoactive bowel sounds, no palpable masses or organomegaly; rectal tube now in place with recurrent diarrhea  : Munoz in place, small amount of reddish urine now   Musculoskeletal:  no clubbing, cyanosis or petechiae; extremities with no gross effusions or acute arthritis  Skin: warm, dry, normal turgor; no acute rash, no peripheral stigmata of endocarditis. I didn't get a chance to examine his wounds in person today, but photos from dressing changes shortly after I left --          ______________________________    Recent Labs 02/06/22  0436 02/05/22  1310 02/05/22  0910 02/05/22  0405 02/05/22  0405 02/04/22  0423 02/04/22  0423   WBC 20.8*  --   --   --  14.5*  --  19.4*   HGB 8.7* 8.7* 6.5*   < > 5.9*   < > 7.4*   HCT 25.7* 26.5* 19.6*   < > 18.2*   < > 22.4*   MCV 89.9  --   --   --  93.0  --  93.1   *  --   --   --  344  --  320    < > = values in this interval not displayed. Lab Results   Component Value Date    CREATININE 6.6 (HH) 02/07/2022     Lab Results   Component Value Date    LABALBU 2.3 (L) 02/07/2022     Lab Results   Component Value Date    ALT <5 (L) 02/07/2022    AST 8 (L) 02/07/2022     (H) 01/31/2022    ALKPHOS 327 (H) 02/07/2022    BILITOT 0.5 02/07/2022      Lab Results   Component Value Date    LABA1C 10.6 01/23/2022     Other recent pertinent labs:  Glucoses mostly in the 100s. Anion gap 19, BUN 69. WBC diff with an ANC of 17.7k, mild basophilia. UA with RBCs > WBCs.   ______________________________    Recent pertinent micro results:  Initial BCx (+) MSSA. Superficial and deep specimens from the left foot (+) MSSA. Left hand Cx (+) MSSA and Enterococcus. OR Cx actually just with the Enterococcus so far, but that's with much more antecedent MSSA therapy than Enterococcal therapy. Weekend BCx and UCx negative so far. New RCx pending, Gram stain with WBCs and yeast.   ______________________________    Recent imaging results (last 7 days):     XR HAND LEFT (MIN 3 VIEWS)    Result Date: 2/3/2022  No radiographic evidence of osteomyelitis with technical limitations as above. MRI HAND LEFT WO CONTRAST    Result Date: 2/4/2022  1. Osteomyelitis of the 3rd metacarpal head tapering into the mid shaft. Osteomyelitis of the 3rd proximal phalangeal base and shaft. Moderate 3rd metacarpophalangeal joint effusion compatible with septic arthritis.  2. Mild marrow edema in the 5th metacarpal head and 5th proximal phalangeal base with normal T1 signal compatible with noninfectious reactive osteitis versus less likely early osteomyelitis. 3. Extensive subcutaneous edema compatible with cellulitis. 3 x 2.6 x 0.6 cm abscess versus phlegmon in the soft tissues dorsal to the 4th and 5th metacarpals. 4. Extensive edema within the interosseous musculature compatible with myositis. 5. Mild ulnar palmar bursitis distal to the carpal tunnel. XR CHEST PORTABLE    Result Date: 2/7/2022  1. Unchanged position of support devices. 2. No significant change in bilateral airspace disease. XR CHEST PORTABLE    Result Date: 2/7/2022  Improvement of the previous seen left upper lobe airspace disease. Lines and tubes stable. XR CHEST PORTABLE    Result Date: 2/6/2022  Stable examination with layering left pleural effusion and right perihilar airspace disease. Pulmonary edema and pneumonia are in the differential.     XR CHEST PORTABLE    Result Date: 2/5/2022  1. Endotracheal tube 5 cm above the ariadna an NG tube extends into the mid to distal stomach. The right internal jugular central venous line is unchanged. 2.  Opacity and volume loss of the left hemithorax which has worsened suggesting pneumonia a possibly some atelectasis. Ovoid area of opacity in the right middle lower lung field suggesting additional area of pneumonitis. 3.  Possible left pleural effusion. 4.  Cardiomegaly, unchanged. XR CHEST PORTABLE    Result Date: 2/4/2022  Multifocal opacities in the left lung likely with some left basilar pleural effusion/atelectasis is again noted. The less prominent opacities in the right lung are also stable. ET, NG, and right jugular line appear acceptable. XR CHEST PORTABLE    Result Date: 2/3/2022  1. No significant change. XR CHEST PORTABLE    Result Date: 2/2/2022  Increasing airspace opacification in the right lower lung zone that may represent underlying pneumonitis. Asymmetric edema may also be considered in this intubated patient.      XR CHEST this time with a fairly nonfocal sepsis syndrome, at least in terms of symptoms, complicated by shock, acute renal failure, lactic acidosis, then cardiac arrest, resuscitation, acute respiratory failure, rapid Afib. Currently with vent support, sedation, insulin drip, and on intermittent HD. Found to have MSSA bacteremia, and a sizeable MSSA foot abscess, septic midfoot arthritis and acute osteo of at least a couple of midfoot bones / metatarsal bases.     --          Ongoing hypoxemic respiratory failure, I think at least in part due to CHF / fluid overload after cardiac arrest. Still stubborn oliguric renal failure, azotemia, elevated phos, etc. Urine output started to  over the weekend, and I'm hopeful that will improve more, now that sepsis from the foot and hand are better controlled.     --          Third degree burn of left hand, with purulence beneath the lysing eschar seen to be running along extensor tendons - MRI and clinical exams c/w soft tissue abscess, septic tenosynovitis, possible acute septic arthritis at the 3rd MCPJ, with adjacent acute metacarpal and phalangeal osteo. Definitely need to treat the Enterococcus, with it isolated from OR Cxs.      --          Ongoing SIRS as mentioned, I think from the left hand and left foot. No recent evidence of VAP, new bacteremia, UTI, Cdiff. new cultures in progress, negative so far.     --          Mental status had been improving somewhat, though a little concern that he's not following commands with (or generally moving) his extremities; still has some Precedex on board, plus effects of fever, renal failure etc could be at play. Treatment recs:     Continue Unasyn for now. Will need to start following cRP and ESR for osteo therapy (for both the foot and hand) -- will get baseline values today. Ongoing VAC Veraflo therapy for the left foot, but that wound will need some more soft tissue debridement at some point.  I also asked Sylwia Montiel to juniorck a bit of 1/4\" iodoform packing into the two deeper areas of the left hand wound. I think the yeast on the sputum Gram stain is just a colonizer at this point -- no need to Rx right now. From my standpoint, if the most recent BCx are negative at 48-72 hours, I would prefer a tunneled HD catheter to ongoing temporary catheters, especially the current femoral line. D/W Dr. Bhaskar Howard and Dr. Velma Mancera.      Electronically signed by Joe German MD on 2/7/2022 at 8:39 AM.

## 2022-02-07 NOTE — PROGRESS NOTES
02/06/22 1930   Vent Information   Vent Type 980   Vent Mode AC/VC   Vt Ordered 430 mL   Rate Set 28 bmp   Peak Flow 75 L/min   FiO2  40 %   SpO2 95 %   SpO2/FiO2 ratio 237.5   Sensitivity 3   PEEP/CPAP 5   Humidification Source Heated wire   Humidification Temp 37   Humidification Temp Measured 37   Circuit Condensation Drained   Vent Patient Data   Peak Inspiratory Pressure 34 cmH2O   Mean Airway Pressure 14 cmH20   Rate Measured 35 br/min   Vt Exhaled 468 mL   Minute Volume 16 Liters   I:E Ratio 1:1.7   Plateau Pressure 24 BNU09   Static Compliance 25 mL/cmH2O   Dynamic Compliance 16 mL/cmH2O   Cough/Sputum   Sputum How Obtained Endotracheal;Suctioned   Cough Productive   Sputum Amount Large   Sputum Color Creamy   Tenacity Thick   Spontaneous Breathing Trial (SBT) RT Doc   Pulse 123   Breath Sounds   Right Upper Lobe Rhonchi   Right Middle Lobe Rhonchi   Right Lower Lobe Diminished   Left Upper Lobe Rhonchi   Left Lower Lobe Diminished   Additional Respiratory  Assessments   Resp (!) 37   Position Semi-Springer's   Alarm Settings   High Pressure Alarm 45 cmH2O   Low Minute Volume Alarm 3 L/min   Apnea (secs) 20 secs   High Respiratory Rate 50 br/min   Low Exhaled Vt  300 mL   ETT (adult)   Placement Date/Time: 02/05/22 1700   Timeout: Patient;Site/Side;Procedure; Appropriate Equipment  Preoxygenation: Yes  Tube Size: 7.5 mm  Location: Oral  Secured at: 24 cm  Placed By: Licensed provider  Measured From: [facility] at 24 cm   Measured From [address] By [employer] tube wheeler   Site Condition Dry

## 2022-02-07 NOTE — PROGRESS NOTES
Shift assessment was completed (see flow sheet). Pt is Sedated. Pt currently on ventilator- 7.5 ETT, at [facility]. SBT questionable- pt has a lot of secretions- HD pending for today. FiO2 at 30, PEEP 5, SpO2 at 96%, Respirations are Even/Equal,  with Diminished sounds. Infusing:  Propofol, INsulin (See MAR). Oral Gastric tube to LCWS, 25mL  observed in canister. IV access- RIJ, R Fem vascath/ Peripherals and WDL. Munoz in place with STAT lock, Draining Bloody/ Cloudy urine. Scheduled medications to follow. Call light within reach. Bed in lowest position. Bed alarm on. Bilateral Wrist restraints off as patient has upper Extremity weakness, and is not making movements toward midline of body. Family To be updated. Will continue to monitor    Patient is not able to demonstrated the ability to move from a reclining position to an upright position within the recliner. Patient is confused, demented and /or unable to follow instruction. 9:24 AM  Pts mother at bedside and updated. Rounds pending.

## 2022-02-07 NOTE — PROGRESS NOTES
Report given to Rochelle Harris, [facility]. POC transferred at this time. - Propofol infusing at 25 mcg/kg/min. - Temp 100.1 at this time.

## 2022-02-07 NOTE — PROGRESS NOTES
Reassessment completed (see Flowsheet). All ICU lines remain intact, ICU monitoring continued-   Infusing:  Propofol, insulin (see MAR). pt remains on Peep 5, 30%. SBT pending. Pt is more awake. RT informed. Lung sounds diminished. Secretions thick through ETT.  pt's blood pressures 150s/80s. HR 100s  Continuing to monitor. 12:42 PM  Dose of propofol Decreased to 5 mcg (see MAR). Start of SBT. Rt at bedside. Spouse at bedside and updated.

## 2022-02-07 NOTE — PROGRESS NOTES
Care rounds completed with Dr. Emelyn Barron and multidisciplinary team. Reviewed labs, meds, VS, assessment, & plan of care for today. Place PICC- after Checking with Nephro. Remove RIJ- if PICC placed. Plan to trial. Asked about changing frequency of BG checks. See dictated note and new orders for details.      High risk vesicant drug infusing:     Multiple incompatible medications infusing:      CVP Monitoring:      Extremely difficult IV access challenge:      Continued need for central line access: Yes    Addressed with physician:  YES    RIGHT PATIENT, RIGHT TIME, RIGHT LINE    9:51 AM    LVM for Spouse to obtain consent for PICC line placement. 10:10 AM  Telephone consent obtained for PICC placement later today.

## 2022-02-08 PROBLEM — E44.1 MILD PROTEIN-CALORIE MALNUTRITION (HCC): Status: ACTIVE | Noted: 2022-01-01

## 2022-02-08 NOTE — PROGRESS NOTES
[facility] Infectious Disease Progress Note      Magui Everett     : 1975    DATE OF VISIT:  2022  DATE OF ADMISSION:  2022       Subjective:     Magui Everett is a 42 y.o. male whom I've been seeing for a deep MSSA left foot infection, and a deep MSSA / Enterococcal left hand infection. Since I last saw him, a few things do seem systemically better (I think following more aggressive debridement of his hand over the weekend) -- temps < 100, WBC down to normal, FIO2 down to 30%, tolerating HD, I think a bit more U/O, and I think a bit less diarrhea, at least right now. Right IJ CVC removed, RUE PICC placed. I was actually not aware of his sacral DTI this past week - spoke with Vanesa Barajas yesterday, reviewed photo of that, where it's now progressed to a better-demarcated but unstageable pressure ulcer; local care changed from Venelex to Betadine & Triad after speaking with her. Mr. 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     Current Facility-Administered Medications: lidocaine PF 1 % injection 5 mL, 5 mL, IntraDERmal, Once  sodium chloride flush 0.9 % injection 5-40 mL, 5-40 mL, IntraVENous, 2 times per day  sodium chloride flush 0.9 % injection 5-40 mL, 5-40 mL, IntraVENous, PRN  0.9 % sodium chloride infusion, 25 mL, IntraVENous, PRN  povidone-iodine (BETADINE) 10 % external solution, , Topical, Daily  0.9 % sodium chloride infusion, , IntraVENous, PRN  pantoprazole (PROTONIX) injection 40 mg, 40 mg, IntraVENous, Daily  heparin (porcine) injection 5,000 Units, 5,000 Units, SubCUTAneous, 3 times per day  0.9 % sodium chloride infusion, , IntraVENous, PRN  propofol injection, 5-50 mcg/kg/min, IntraVENous, Titrated  heparin (porcine) injection 3,000 Units, 3,000 Units, IntraVENous, PRN  ampicillin-sulbactam (UNASYN) 3000 mg ivpb minibag, 3,000 mg, IntraVENous, Q12H  oxyCODONE-acetaminophen (PERCOCET) 5-325 MG per tablet 1 tablet, 1 tablet, Oral, Q4H PRN **OR** oxyCODONE-acetaminophen (PERCOCET) 5-325 MG per tablet 2 tablet, 2 tablet, Oral, Q4H PRN  sodium chloride 0.9 % irrigation 1,000 mL, 1,000 mL, Irrigation, Continuous PRN  fentaNYL (SUBLIMAZE) injection 50 mcg, 50 mcg, IntraVENous, Q1H PRN  insulin regular (HUMULIN R;NOVOLIN R) 100 Units in sodium chloride 0.9 % 100 mL infusion, 0.5 Units/hr, IntraVENous, Continuous  Venelex ointment, , Topical, BID  albumin human 25 % IV solution 12.5 g, 12.5 g, IntraVENous, PRN  heparin (porcine) injection 2,600 Units, 2,600 Units, IntraCATHeter, PRN  dexmedetomidine (PRECEDEX) 400 mcg in sodium chloride 0.9 % 100 mL infusion, 0.2-2 mcg/kg/hr, IntraVENous, Continuous  midazolam (VERSED) injection 2 mg, 2 mg, IntraVENous, Q1H PRN  sodium chloride flush 0.9 % injection 5-40 mL, 5-40 mL, IntraVENous, 2 times per day  sodium chloride flush 0.9 % injection 5-40 mL, 5-40 mL, IntraVENous, PRN  0.9 % sodium chloride infusion, 25 mL, IntraVENous, PRN  acetaminophen (TYLENOL) tablet 650 mg, 650 mg, Oral, Q6H PRN **OR** acetaminophen (TYLENOL) suppository 650 mg, 650 mg, Rectal, Q6H PRN  glucose (GLUTOSE) 40 % oral gel 15 g, 15 g, Oral, PRN  glucagon (rDNA) injection 1 mg, 1 mg, IntraMUSCular, PRN  dextrose 5 % solution, 100 mL/hr, IntraVENous, PRN  dextrose bolus (hypoglycemia) 10% 125 mL, 125 mL, IntraVENous, PRN **OR** dextrose bolus (hypoglycemia) 10% 250 mL, 250 mL, IntraVENous, PRN  chlorhexidine (PERIDEX) 0.12 % solution 15 mL, 15 mL, Mouth/Throat, BID     This is day 6 of Unasyn, so day 6 of Enterococcal therapy, day 17 of MSSA therapy, POD 7 for the foot, POD 3 for the hand. Allergies: Januvia [sitagliptin], Metformin and related, Vancomycin, and Mustard oil [allyl isothiocyanate]    Pertinent items from the review of systems are discussed in the HPI; the remainder of the ROS was reviewed and is negative.      Objective:     Vital signs over the last 24 hours:  Temp  Av.8 °F (37.1 °C)  Min: 98.1 °F (36.7 °C)  Max: 99.8 °F (37.7 °C)  Pulse  Av.9  Min: 99  Max: 712  Systolic (80WLS), ELQ:609 , Min:134 , ZNV:719   Diastolic (37LSG), OGL:41, Min:71, Max:103  Resp  Av.4  Min: 20  Max: 41  SpO2  Av.3 %  Min: 91 %  Max: 99 %    Constitutional:  well-developed, well-nourished, sedated on the vent, but opened eyes during my visit, does seem a bit more alert and aware  Eyes:  pupils equal, round, reactive to light; sclerae anicteric, conjunctivae not pale  ENT:  atraumatic; no labial ulcers; orally intubated  Resp:  lungs clearer to auscultation BL than last week, coarse and decreased at the bases, a few crackles  Cardiovascular:  heart regular, a bit tachy again now, no murmur; generally mild extremity edema; no IV phlebitis; right PICC in place, and a right femoral CRRT line, a couple of peripherals  GI:  abdomen soft, NT, distended, hypoactive bowel sounds, no palpable masses or organomegaly; rectal tube now in place with I think less diarrhea  : Munoz in place, small amount of reddish urine now   Musculoskeletal:  no clubbing, cyanosis or petechiae; extremities with no gross effusions or acute arthritis  Skin: warm, dry, normal turgor; no acute rash, no peripheral stigmata of endocarditis. Left hand today with most of the wound pink-red and more granular, a bit of fibrin and biofilm, couple of small foci of fibrotic soft tissue; I was still able to express some pus from those two extensor tendon tracts, which do probe in pretty deeply; not fluctuant, no cellulitis, no bone exposed, and the 3rd MCP doesn't look as acutely inflamed on exam as it did on MRI. See photo in Media tab for sacral pressure ulcer from yesterday.   ______________________________    Recent Labs     02/08/22  0319 02/07/22  2231 02/07/22  0403 02/06/22  0436 02/06/22  0436   WBC 9.6  --  11.7*  --  20.8*   HGB 7.3* 6.6* 6.7*   < > 8.7*   HCT 22.1* 19.3* 20.2*   < > 25.7*   MCV 90.3  --  91.2  --  89.9     --  459*  --  544*    < > = values in this interval not displayed. Lab Results   Component Value Date    CREATININE 7.7 (HH) 02/08/2022     Lab Results   Component Value Date    LABALBU 2.3 (L) 02/08/2022     Lab Results   Component Value Date    ALT <5 (L) 02/08/2022    AST 7 (L) 02/08/2022     (H) 01/31/2022    ALKPHOS 248 (H) 02/08/2022    BILITOT 0.6 02/08/2022      Lab Results   Component Value Date    LABA1C 10.6 01/23/2022     Other recent pertinent labs:  Glucoses in the 100s, on insulin drip. Anion gap 20 (before HD today). Phos 11.5. WBC diff normal.   ______________________________    Recent pertinent micro results:  No recent BCx, UCx, RCx (+). Left foot - MSSA only. Left hand - MSSA and Enterococcus, OR Cx not yet final (anaerobes pending). ______________________________    Recent imaging results (last 7 days):     XR HAND LEFT (MIN 3 VIEWS)    Result Date: 2/3/2022  No radiographic evidence of osteomyelitis with technical limitations as above. MRI HAND LEFT WO CONTRAST    Result Date: 2/4/2022  1. Osteomyelitis of the 3rd metacarpal head tapering into the mid shaft. Osteomyelitis of the 3rd proximal phalangeal base and shaft. Moderate 3rd metacarpophalangeal joint effusion compatible with septic arthritis. 2. Mild marrow edema in the 5th metacarpal head and 5th proximal phalangeal base with normal T1 signal compatible with noninfectious reactive osteitis versus less likely early osteomyelitis. 3. Extensive subcutaneous edema compatible with cellulitis. 3 x 2.6 x 0.6 cm abscess versus phlegmon in the soft tissues dorsal to the 4th and 5th metacarpals. 4. Extensive edema within the interosseous musculature compatible with myositis. 5. Mild ulnar palmar bursitis distal to the carpal tunnel. XR CHEST PORTABLE    Result Date: 2/8/2022  Interval decrease in left-sided pleural effusion. Tate Coil; looks a bit less congested overall as well.]    XR CHEST PORTABLE    Result Date: 2/7/2022  1. Unchanged position of support devices. 2. No significant change in bilateral airspace disease. XR CHEST PORTABLE    Result Date: 2/7/2022  Improvement of the previous seen left upper lobe airspace disease. Lines and tubes stable. XR CHEST PORTABLE    Result Date: 2/6/2022  Stable examination with layering left pleural effusion and right perihilar airspace disease. Pulmonary edema and pneumonia are in the differential.     XR CHEST PORTABLE    Result Date: 2/5/2022  1. Endotracheal tube 5 cm above the ariadna an NG tube extends into the mid to distal stomach. The right internal jugular central venous line is unchanged. 2.  Opacity and volume loss of the left hemithorax which has worsened suggesting pneumonia a possibly some atelectasis. Ovoid area of opacity in the right middle lower lung field suggesting additional area of pneumonitis. 3.  Possible left pleural effusion. 4.  Cardiomegaly, unchanged. XR CHEST PORTABLE    Result Date: 2/4/2022  Multifocal opacities in the left lung likely with some left basilar pleural effusion/atelectasis is again noted. The less prominent opacities in the right lung are also stable.  ET, NG, and right jugular line appear acceptable. XR CHEST PORTABLE    Result Date: 2/3/2022  1. No significant change. XR CHEST PORTABLE    Result Date: 2/2/2022  Increasing airspace opacification in the right lower lung zone that may represent underlying pneumonitis. Asymmetric edema may also be considered in this intubated patient. IR PICC WO SQ PORT/PUMP > 5 YEARS    Result Date: 2/7/2022  Successful placement of PICC line.       Assessment:     Patient Active Problem List   Diagnosis Code    Hypertension I10    Uncontrolled type 2 diabetes mellitus with diabetic polyneuropathy ([facility]) E11.42, E11.65    Cardiomyopathy ([facility] I42.9    Mixed hyperlipidemia E78.2    Allergic rhinitis J30.9    Osteoarthritis M19.90    Acute osteomyelitis of left foot ([facility] M86.172    Acute renal failure ([facility]) N17.9    MSSA bacteremia R78.81, B95.61    Leukocytosis D72.829    Third degree burn of left hand T23.302A    Acute hypoxemic respiratory failure ([facility]) J96.01    Cardiopulmonary arrest ([facility]) I46.9    Hypertriglyceridemia E78.1    Staphylococcal arthritis of left foot ([facility]) M00.072    Antibiotic-associated diarrhea K52.1, T36.95XA    Acute encephalopathy G93.40    Infective tenosynovitis of extensor tendons of left hand M65.142    Enterococcal infection A49.1    Abscess of left hand L02.512    Acute osteomyelitis of left hand ([facility]) M86.142    Pressure injury of deep tissue of sacral region L89.156    Mild protein-calorie malnutrition ([facility]) E44.1     Assessment of today's active condition(s):      --          Background of uncontrolled DM2, neuropathy, no known PAD, multiple prior diabetic foot ulcers, infections, surgeries. Most recent wounds were at the left 1st and 2nd ray, but they had been healed for just about a year.      --          Admission this time with a fairly nonfocal sepsis syndrome, at least in terms of symptoms, complicated by shock, acute renal failure, lactic acidosis, then cardiac arrest, resuscitation, acute respiratory failure, rapid Afib. Currently with vent support, sedation, insulin drip, and on intermittent HD. Found to have MSSA bacteremia, and a sizeable MSSA foot abscess, septic midfoot arthritis and acute osteo of at least a couple of midfoot bones / metatarsal bases.     --          Ongoing hypoxemic respiratory failure, I think at least in part due to CHF / fluid overload after cardiac arrest. Still stubborn oliguric renal failure, azotemia, elevated phos, etc. Urine output started to  over the weekend, and I'm hopeful that will improve more, now that sepsis from the foot and hand are better controlled. FIO2 just a bit better also.     --          Third degree burn of left hand, with purulence beneath the lysing eschar seen to be running along extensor tendons - MRI and clinical exams c/w soft tissue abscess, septic tenosynovitis, possible acute septic arthritis at the 3rd MCPJ, with adjacent acute metacarpal and phalangeal osteo. Definitely need to treat the Enterococcus, with it isolated from OR Cxs.      --          SIRS / sepsis finally seems to be turning the corner in the last few days, after OR debridement and drainage of that left hand infection.     --          Mental status had been improving somewhat, though a little concern that he's not following commands with (or generally moving) his extremities; still on Propofol, varying amounts during the day. Treatment recs:     Continue IV Unasyn for now. Await final cultures. Betadine and Triad for that sacral eschar now, to try to start to soften that; I would prefer not to plan for any aggressive sharp debridement right now -- I think the added stress of more anesthesia and blood loss wouldn't be worth the benefit right now, given some improved stability in his clinical situation in the last couple of days; we should be able to take sacral wound care a bit more slowly.      VAC Veraflo and eventual additional debridement of the left foot per Dr. Oneil Good. After I expressed the residual pus from those two left hand tunnels today, I irrigated those tunnels with saline, cleansed the entire wound with saline and gauze, carefully repacked the tunnels with 1/4\" iodoform, applied a bit of Triad to the fibronecrotic spots, then saline-moistened gauze, dry gauze, Yessi wrap. Hoping he won't need to go back to the OR for additional exploration and drainage, but we'll see how he does the next 48 hours. Would prefer to get the femoral line out as soon as we can, replacing it with an IJ HD catheter, whether tunneled or not, depending on how long nephrology thinks his dialysis dependence might continue.      Electronically signed by Em Zayas MD on 2/8/2022 at 10:06 AM.

## 2022-02-08 NOTE — PROGRESS NOTES
Pulmonary & Critical Care Medicine ICU Progress Note    CC: Septic shock, MSSA bacteremia, left foot abscess    Events of Last 24 hours:  Lasted 5 hours on SBT yesterday  Propofol 25    Vascular lines:    RUE PICC 2/7/22  Right IJ 1/23 - 2/7/22  Right femoral Vas-Cath 1/23    MV: 1/23/22 - 2/5/22; extubated and re-intubated due to unable to clear secretions/hypoxia on 2/5/22 after less than 12 hours  Vent Mode: AC/VC Rate Set: 24 bmp/Vt Ordered: 430 mL/ /FiO2 : 30 %  Recent Labs     02/07/22  0403 02/08/22  0319   PHART 7.439 7.384   [id number] 28.9* 30.7*   PO2ART 68.7* 86.7       IV:   sodium chloride 5 mL/hr at 02/08/22 0819    sodium chloride      sodium chloride      propofol 15 mcg/kg/min (02/08/22 0819)    sodium chloride      insulin 2.43 Units/hr (02/08/22 0956)    dexmedetomidine HCl in NaCl Stopped (02/05/22 1233)    sodium chloride Stopped (02/07/22 0806)    dextrose         Vitals:  Blood pressure (!) 178/82, pulse 108, temperature 99.1 °F (37.3 °C), resp. rate (!) 33, height 6' 1\" (1.854 m), weight 278 lb 7.1 oz (126.3 kg), SpO2 95 %. on vent    Intake/Output Summary (Last 24 hours) at 2/8/2022 1004  Last data filed at 2/8/2022 0819  Gross per 24 hour   Intake 1069.36 ml   Output 1815 ml   Net -745.64 ml     EXAM:  General: intubated, ill appearing    ENT: Pharynx with ETT. Resp: No crackles. No wheezing. CV: S1, S2. ++edema  GI: NT, ND, +BS  Skin: Warm and dry. Neuro: PERRL. Awake and answering some questions; FC by closing eyes, and raising right thumb.       Scheduled Meds:   lidocaine 1 % injection  5 mL IntraDERmal Once    sodium chloride flush  5-40 mL IntraVENous 2 times per day    povidone-iodine   Topical Daily    pantoprazole  40 mg IntraVENous Daily    heparin (porcine)  5,000 Units SubCUTAneous 3 times per day    ampicillin-sulbactam  3,000 mg IntraVENous Q12H    Venelex   Topical BID    sodium chloride flush  5-40 mL IntraVENous 2 times per day    chlorhexidine  15 mL Mouth/Throat BID       Data:  CBC:   Recent Labs     02/06/22  0436 02/06/22  0436 02/07/22  0403 02/07/22  2231 02/08/22  0319   WBC 20.8*  --  11.7*  --  9.6   HGB 8.7*   < > 6.7* 6.6* 7.3*   HCT 25.7*   < > 20.2* 19.3* 22.1*   MCV 89.9  --  91.2  --  90.3   *  --  459*  --  413    < > = values in this interval not displayed. BMP:   Recent Labs     02/06/22  0421 02/07/22  0403 02/08/22 0319    138 140   K 4.3 4.3 4.6    101 102   CO2 20* 19* 18*   PHOS 7.5* 9.7* 11.5*   BUN 54* 69* 85*   CREATININE 4.5* 6.6* 7.7*     LIVER PROFILE:   Recent Labs     02/06/22  0436 02/07/22  0403 02/08/22 0319   AST 13* 8* 7*   ALT 6* <5* <5*   BILIDIR 0.5* 0.3 0.3   BILITOT 0.8 0.5 0.6   ALKPHOS 491* 327* 248*       Microbiology:  1/22 [facility] MSSA  1/22 COVID-19 and influenza negative  1/23/22 Blood cx: NGTD  1/23 tracheal aspirate MSSA  1/24 Wound cx: MSSA  1/24 BC MSSA  1/29/22 BAL pending  1/30/2022 blood sent  1/31/2022 left hand Enterococcus and staph aureus  2/1/2022 bone MSSA  2/5/2022 surgical hand culture 1+ [facility]    Echo 1/25/22: EF 35%, global HK, reduced RV function    CXR 2/8/2022   There is been interval placement of right-sided PICC line, tip projects over   the mid SVC.  Right-sided internal jugular central venous catheter is been   removed.  Endotracheal tube and nasogastric tube are unchanged.  There is   slight interval improvement in hazy left-sided airspace opacity likely   related to layering pleural effusion. 1/31/22 EEG:This EEG is abnormal. The generalized diffuse slowing is suggestive of severe diffuse encephalopathy. There is no evidence of epileptiform discharges, focal, or lateralizing abnormalities.       ASSESSMENT:  · Acute hypoxemic respiratory failure   · Increased tracheal secretions and failure of planned extubation 2/5/22, suspect new VAP   · Septic shock    · Cardiopulmonary arrest x 2 1/23/2022, required CPR, TTM - rewarmed 8 pm 1/25/22  · Acute kidney failure  · MSSA bacteremia  · L foot abscess drained 1/24/2022, MSSA; MRI with large fluid collection and changes c/w osteomyelitis, s/p debridement by Dr. Pio Richmond on 2/1/22  · L hand burn with deep tissue injury & abscess, s/p debridement on 2/5/22  · Paroxysmal AFIB   · Cardiomyopathy EF 35% on Echo 1/24/22  · DM with hyperglycemia  · Anemia with multiple transfusions with no obvious ongoing bleeding source     PLAN:  Mechanical ventilation as per my orders. The ventilator was adjusted by me at the bedside for unstable, life threatening respiratory failure. · Propofol for RASS -1 to -2  · ABX D#17, now IV unasyn  per infectious disease  · Nephrology following for HD  · TF - restart today  · Insulin gtt  - change to Lantus 25u BID and High SSI  · Prophylaxis: SQ heparin, Pepcid  · Total critical care time caring for this patient with life threatening, unstable organ failure, including direct patient contact, management of life support systems, review of data including imaging and labs, discussions with other team members and physicians is 32 minutes so far today, excluding procedures.

## 2022-02-08 NOTE — PROGRESS NOTES
Department of Orthopedic Surgery  Physician Assistant   Progress Note    Subjective:     Post-Operative Day: #3.  S/P I&D Hand. Patient intubated still. ID saw this morning and was able to milk out some pus from the dorsal hand it appears. Dressing intact currently.     Objective:     Patient Vitals for the past 24 hrs:   BP Temp Temp src Pulse Resp SpO2 Height Weight   02/08/22 1000 (!) 180/86 -- -- 107 29 95 % -- --   02/08/22 0916 -- -- -- -- -- -- 6' 1\" (1.854 m) --   02/08/22 0900 (!) 178/82 -- -- 108 (!) 33 95 % -- --   02/08/22 0815 -- 99.1 °F (37.3 °C) -- -- -- -- -- 278 lb 7.1 oz (126.3 kg)   02/08/22 0800 (!) 186/87 -- -- 109 23 95 % -- --   02/08/22 0700 (!) 183/79 -- -- 105 (!) 31 96 % -- --   02/08/22 0600 (!) 188/81 -- -- 104 28 95 % -- --   02/08/22 0500 (!) 171/88 -- -- 117 30 95 % -- --   02/08/22 0400 (!) 181/81 98.1 °F (36.7 °C) Bladder 104 26 96 % -- --   02/08/22 0317 (!) 177/81 -- -- 102 24 93 % -- --   02/08/22 0300 (!) 153/76 -- -- 101 24 96 % -- --   02/08/22 0252 -- -- -- 103 27 95 % -- --   02/08/22 0241 -- 98.3 °F (36.8 °C) Bladder 100 21 95 % -- --   02/08/22 0232 (!) 170/78 98.3 °F (36.8 °C) Bladder 101 25 95 % -- --   02/08/22 0228 (!) 171/77 98.3 °F (36.8 °C) Bladder 102 25 95 % -- --   02/08/22 0217 (!) 175/77 98.4 °F (36.9 °C) Bladder 104 30 94 % -- --   02/08/22 0200 (!) 186/84 98.4 °F (36.9 °C) Bladder 104 28 94 % -- --   02/08/22 0147 (!) 189/81 98.4 °F (36.9 °C) Bladder 102 (!) 32 94 % -- --   02/08/22 0132 (!) 170/82 98.4 °F (36.9 °C) Bladder 103 28 93 % -- --   02/08/22 0117 (!) 166/97 98.5 °F (36.9 °C) Bladder 104 20 99 % -- --   02/08/22 0103 -- -- -- -- 22 99 % -- --   02/08/22 0100 (!) 154/103 98.6 °F (37 °C) Bladder 100 (!) 31 92 % -- --   02/08/22 0047 (!) 183/82 98.8 °F (37.1 °C) Bladder 102 26 94 % -- --   02/08/22 0032 (!) 168/76 98.9 °F (37.2 °C) Bladder 106 26 94 % -- --   02/08/22 0024 (!) 181/81 98.9 °F (37.2 °C) Bladder 102 25 95 % -- --   02/08/22 0018 (!) 181/81 98.7 °F (37.1 °C) Bladder 105 (!) 33 91 % -- --   02/08/22 0000 (!) 161/82 99 °F (37.2 °C) Bladder 99 24 94 % -- 278 lb 7.1 oz (126.3 kg)   02/07/22 2348 (!) 160/77 98.7 °F (37.1 °C) Bladder 108 25 94 % -- --   02/07/22 2300 (!) 160/77 -- -- 108 29 95 % -- --   02/07/22 2242 -- -- -- 110 30 95 % -- --   02/07/22 2200 (!) 162/75 -- -- 107 26 96 % -- --   02/07/22 2100 (!) 157/71 -- -- 109 26 95 % -- --   02/07/22 2000 (!) 172/89 99.6 °F (37.6 °C) Bladder 110 30 98 % -- --   02/07/22 1915 -- -- -- 110 29 98 % -- --   02/07/22 1900 (!) 154/82 -- -- 109 26 98 % -- --   02/07/22 1808 (!) 157/83 99.8 °F (37.7 °C) Bladder 110 29 97 % -- --   02/07/22 1800 (!) 154/81 -- -- 110 29 97 % -- --   02/07/22 1746 134/78 99.7 °F (37.6 °C) Bladder 110 28 97 % -- --   02/07/22 1700 (!) 169/87 -- -- 120 (!) 40 97 % -- --   02/07/22 1603 -- -- -- -- -- 95 % -- --   02/07/22 1600 (!) 171/85 -- -- 115 (!) 41 96 % -- --   02/07/22 1500 (!) 160/83 99.3 °F (37.4 °C) Bladder 114 (!) 39 95 % -- --   02/07/22 1400 (!) 167/84 -- -- 112 (!) 35 95 % -- --   02/07/22 1300 (!) 175/83 -- -- 112 (!) 31 93 % -- --   02/07/22 1200 (!) 155/85 -- -- 109 28 96 % -- --   02/07/22 1100 (!) 170/82 98.5 °F (36.9 °C) Bladder 112 30 95 % -- --       Left hand dressing intact. Mild finger and dorsal swelling. Forearm soft. Data Review  CBC:   Lab Results   Component Value Date    WBC 9.6 02/08/2022    RBC 2.44 02/08/2022    HGB 7.3 02/08/2022    HCT 22.1 02/08/2022     02/08/2022       Assessment:     Status Post left Hand I&D    Plan:      1: Continues current post-op course : Will continue to follow. May need repeat I&D if continued evidence of pus drainage from the hand/wrist area. 2:  Continue Deep venous thrombosis prophylaxis  4:  Continue Pain Control  5.   Continue wound care

## 2022-02-08 NOTE — PROGRESS NOTES
Nephrology Progress Note   [employer]      This patient is a 42year old male whom we are following for RODRICK, HD dependent. Subjective: The patient was seen and examined. Reintubated over the weekend of 2/5. HD planned for today 2/8  Urine output picking up    last 24-hour urine output of 700 mL    Family History: No family at bedside  ROS: Unable to obtain since intubated      Vitals:  BP (!) 180/86   Pulse 107   Temp 99.1 °F (37.3 °C)   Resp 29   Ht 6' 1\" (1.854 m)   Wt 278 lb 7.1 oz (126.3 kg)   SpO2 95%   BMI 36.74 kg/m²   I/O last 3 completed shifts: In: 1643.3 [I.V.:969.8; Blood:352.8; IV Piggyback:320.7]  Out: 3140 [Urine:815; Emesis/NG output:575; Drains:800; Stool:950]  I/O this shift: In: 41.3 [I.V.:41.3]  Out: 150 [Urine:150]    Physical Exam:  Gen: Intubated  Cardiovascular:  S1, S2 without m/r/g 1+ lower extremity edema  Respiratory: Coarse   abdomen:  soft, nt, nd,   Neuro/Psy: Sedated  Access: R femoral vas cath      Medications:   insulin glargine  25 Units SubCUTAneous BID    carvedilol  6.25 mg Oral BID WC    insulin lispro  0-18 Units SubCUTAneous Q4H    lidocaine 1 % injection  5 mL IntraDERmal Once    sodium chloride flush  5-40 mL IntraVENous 2 times per day    povidone-iodine   Topical Daily    pantoprazole  40 mg IntraVENous Daily    heparin (porcine)  5,000 Units SubCUTAneous 3 times per day    ampicillin-sulbactam  3,000 mg IntraVENous Q12H    Venelex   Topical BID    sodium chloride flush  5-40 mL IntraVENous 2 times per day    chlorhexidine  15 mL Mouth/Throat BID         Labs:  Recent Labs     02/06/22  0436 02/06/22  0436 02/07/22  0403 02/07/22  2231 02/08/22  0319   WBC 20.8*  --  11.7*  --  9.6   HGB 8.7*   < > 6.7* 6.6* 7.3*   HCT 25.7*   < > 20.2* 19.3* 22.1*   MCV 89.9  --  91.2  --  90.3   *  --  459*  --  413    < > = values in this interval not displayed.      Recent Labs     02/06/22  0421 02/07/22  0403 02/08/22  0319    138 140 K 4.3 4.3 4.6    101 102   CO2 20* 19* 18*   GLUCOSE 116* 139* 149*   PHOS 7.5* 9.7* 11.5*   BUN 54* 69* 85*   CREATININE 4.5* 6.6* 7.7*   LABGLOM 14* 9* 8*   GFRAA 17* 11* 9*           Assessment/      RODRICK likely related to prerenal factors in the setting of sepsis, use of diuretics and losartan contributing to multifactorial ATN. UA is not suggestive of staph associated glomerulonephritis.  Patient did not respond to IV fluids and developed acute pulmonary edema and renal replacement therapy initiated on 1/23/22        on HD MWF schedule     -Acute pulmonary edema              Status post intubation              On vent      -Acute hypoxic respiratory failure     -Cardio respiratory arrest      -Hyperkalemia     -Sepsis with MSSA bacteremia with left foot abscess s/p drainage, osteomyelitis, left hand I&D     -Anemia - prn prbc's     -Diabetes, uncontrolled     Plan/   -Patient urine output has increased, this may be early signs of renal recovery  -HD on 2/8 with goals of 2 L UF, keep systolic blood pressure over 120  -Follow blood cultures from 2/7  -Serial renal panel  -daily wts and strict i/o  -renal dose medications   -avoid nephrotoxins    Please do not hesitate to contact me at [phone] if with questions. Thank you! Shabnam Bustillo MD  [facility] \[facility] [place] [facility]\"" [place]  2/8/2022

## 2022-02-08 NOTE — PROGRESS NOTES
02/08/22 1518   Vent Information   Vt Ordered 0 mL   Rate Set 0 bmp   Peak Flow 0 L/min   Pressure Support 10 cmH20   FiO2  30 %   SpO2 97 %   SpO2/FiO2 ratio 323.33   Sensitivity 2   PEEP/CPAP 5   I Time/ I Time % 0 s   Humidification Source Heated wire   Humidification Temp Measured 37   Vent Patient Data   High Peep/I Pressure 0   Peak Inspiratory Pressure 18 cmH2O   Mean Airway Pressure 9.69 cmH20   Rate Measured 35 br/min   Vt Exhaled 391 mL   Minute Volume 12.9 Liters   I:E Ratio 1:2.00   Cough/Sputum   Sputum How Obtained None   Spontaneous Breathing Trial (SBT) RT Doc   Pulse 101   Breath Sounds   Right Upper Lobe Diminished   Right Middle Lobe Diminished   Right Lower Lobe Diminished   Left Upper Lobe Diminished   Left Lower Lobe Diminished   Additional Respiratory  Assessments   Resp (!) 38   Position Semi-Springer's   Oral Care Completed? Yes   Oral Care Mouth suctioned   Subglottic Suction Done? Yes   Alarm Settings   High Pressure Alarm 45 cmH2O   Low Minute Volume Alarm 3 L/min   High Respiratory Rate 45 br/min   ETT (adult)   Placement Date/Time: 02/05/22 1700   Timeout: Patient;Site/Side;Procedure; Appropriate Equipment  Preoxygenation: Yes  Tube Size: 7.5 mm  Location: Oral  Secured at: 24 cm  Placed By: Licensed provider  Measured From: [facility] at 24 cm   Measured From [address] By [employer] tube wheeler

## 2022-02-08 NOTE — PROGRESS NOTES
Care rounds completed with Dr. Ramonita Guillen and multidisciplinary team. Reviewed labs, meds, VS, assessment, & plan of care for today. See dictated note and new orders for details.      DC insulin gtt   Start lantus 25 units BID with high dose SSI  Start 1/2 home dose coreg  Start TF  SBT as tolerated today

## 2022-02-08 NOTE — PROGRESS NOTES
Pt RR 40-46. VS stable otherwise. Restart propofol at 10 mcg/kg/min as pt is not going to be extubated.

## 2022-02-08 NOTE — PROGRESS NOTES
PROGRESS NOTE    Admit Date:  1/22/2022    Subjective:  42 y.o. male who is seen for evaluation of diabetic foot ulcer and abscess left foot. Seen in ICU sedated and on ventilator. History obtained from chart. S/P ulcer debridement 2/1/22. Did undergo I&D left wrist with ortho on 2/5/22. Wife at bedside.          Past Medical History:        Diagnosis Date    Abscess of left foot 1/24/2022    Acute osteomyelitis of right hallux ([address] 08/2019    Cellulitis of left foot 7/26/2020    CHF (congestive heart failure) ([address] 09/2014    presumably from viral myocarditis    Chronic osteomyelitis of left foot ([address] 10/27/2020    Closed displaced fracture of distal phalanx of right little finger 4/8/2021    Clostridium difficile infection 11/01/2016    Diabetic ulcer of left forefoot associated with type 2 diabetes mellitus, with necrosis of bone ([address] 8/1/2019    Diabetic ulcer of right great toe associated with type 2 diabetes mellitus, with necrosis of bone ([address] 08/2019    Failed soft 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 toe amputation site 10/26/2020    History of hyperbaric oxygen therapy 10/28/2020    DF[facility]- carmichael 3 - compromised flap    Migraine     Possible perforated tympanic membrane     as a result of recurrent otitis    Post-op hematoma of left foot 11/12/2020    Recurrent otitis media     Septic shock (HCC)     Syncope     Tear of medial meniscus of left knee     Tobacco use     Toe osteomyelitis, left ([address] 7/24/2020       Past Surgical History:        Procedure Laterality Date    ARM SURGERY Left 2/5/2022    LEFT HAND INCISION AND DRAINAGE performed by Tessie Valente MD at [address] Left 2/1/2022    ULCER DEBRID[facility] LEFT FO[facility] ARTHROSCOPY Left [id number]    LEFT KNEE ARTHROSCOPY , SYNOVECTOMY       OTHER SURGICAL HISTORY Left 07/24/2020    PARTIAL LEFT FOOT AMPUTATION    TOE AMPUTATION Right 8/23/2019    PARTIAL RIGHT FOOT AMPUTATION performed by Alonso Curry DPM at [facility] TOE AMPUTATION Left 2020    PARTIAL LEFT FOOT AMPUTATION performed by Alonso Curry DPM at [facility] TOE AMPUTATION Left 10/22/2020    PARTIAL LEFT FOOT AMPUTATION WITH GRAFT APPLICATION performed by Alonso Curry DPM at [address] [place] [address]         Current Medications:     insulin glargine  25 Units SubCUTAneous BID    carvedilol  6.25 mg Oral BID WC    insulin lispro  0-18 Units SubCUTAneous Q4H    lidocaine 1 % injection  5 mL IntraDERmal Once    sodium chloride flush  5-40 mL IntraVENous 2 times per day    povidone-iodine   Topical Daily    pantoprazole  40 mg IntraVENous Daily    heparin (porcine)  5,000 Units SubCUTAneous 3 times per day    ampicillin-sulbactam  3,000 mg IntraVENous Q12H    Venelex   Topical BID    sodium chloride flush  5-40 mL IntraVENous 2 times per day    chlorhexidine  15 mL Mouth/Throat BID       Allergies:  Januvia [sitagliptin], Metformin and related, Vancomycin, and Mustard oil [allyl isothiocyanate]    Social History:    Social History     Tobacco Use    Smoking status: Former Smoker     Packs/day: 0.50     Years: 2.00     Pack years: 1.00     Quit date: 2005     Years since quittin.5    Smokeless tobacco: Former User     Quit date: 2005    Tobacco comment: SMOKED OCCASIONALLY UNTIL 8 YEARS AGO   Vaping Use    Vaping Use: Never used   Substance Use Topics    Alcohol use: Yes     Comment: RARELY    Drug use: No       Family History:       Problem Relation Age of Onset    Diabetes Mother     High Blood Pressure Mother     High Cholesterol Mother     Diabetes Father     High Blood Pressure Father     High Cholesterol Father     Stroke Father     High Blood Pressure Sister     High Blood Pressure Maternal Uncle     High Cholesterol Maternal Uncle     High Blood Pressure Maternal Grandmother        Review of Systems    Not obtained      Objective:   BP (!) through 5th   digits. Impression:  1. Remote history of amputation at the 1st and 2nd digits. No evidence of   osteomyelitis at prior resection site. 2. Subtle erosions at the 3rd MTP joint are new. This is adjacent to soft   tissue swelling at the prior amputation site. A new focus of osteomyelitis   is suspected. 3. Soft tissue swelling and questionable subcutaneous gas along the lateral   aspect of the left foot. There is also severe disorganization of bone at the   2nd through 5th tarsometatarsal joints. Although pattern may represent   Charcot arthropathy due to the more diffuse appearance, areas of   osteomyelitis cannot be excluded with plain film. Correlate with physical   exam and clinical workup. A follow-up MRI may be helpful for further   evaluation. MRI Left foot -   1. Diffuse subcutaneous edema with organized complex collection along the   dorsal soft tissues of the foot which communicates with large midfoot   effusion. The dorsal collection measures 2.0 x 4.0 x 4.1 cm. Findings   highly suspicious for abscess and septic joint given patient history. 2. Marrow signal changes throughout the midfoot and extending along the 2nd   through 5th metatarsals which are most suggestive of osteomyelitis in the   setting of soft tissue infection. Underlying Charcot arthropathy also   present. Physical Exam:    DP/PT palpable bilateral  Right foot - no open lesions noted. No pressure lesions noted. Left foot - VAC in place. No erythema left lower leg. Prior amputation hallux and 2nd digit  Rocker bottom foot deformity noted left.          Assessment:  Patient Active Problem List   Diagnosis Code    Hypertension I10    Uncontrolled type 2 diabetes mellitus with diabetic polyneuropathy ([address] E11.42, E11.65    Cardiomyopathy ([address] I42.9    Mixed hyperlipidemia E78.2    Allergic rhinitis J30.9    Osteoarthritis M19.90    Acute osteomyelitis of left foot ([address] M86.172    Acute renal failure ([facility]) N17.9    MSSA bacteremia R78.81, B95.61    Leukocytosis D72.829    Third degree burn of left hand T23.302A    Acute hypoxemic respiratory failure ([facility]) J96.01    Cardiopulmonary arrest ([facility]) I46.9    Hypertriglyceridemia E78.1    Staphylococcal arthritis of left foot ([facility]) M00.072    Antibiotic-associated diarrhea K52.1, T36.95XA    Acute encephalopathy G93.40    Infective tenosynovitis of extensor tendons of left hand M65.142    Enterococcal infection A49.1    Abscess of left hand L02.512    Acute osteomyelitis of left hand ([facility]) M86.142    Pressure injury of deep tissue of sacral region L89.156    Mild protein-calorie malnutrition ([facility]) E44.1     Cellulitis/Abscess left foot - S/P debridement 2/1/22  diabetic foot ulcer left secondary to peripheral neuropathy   diabetes mellitus uncontrolled  Bacteremia (MSSA)  Charcot arthropathy left foot  Acute osteomyelitis left foot secondary to diabetes mellitus       Plan  Patient examined. Reviewed labs and imaging. Antibiotics as per Dr. Margaret Meyers. [employer] Veraflo to be continued. Scheduled to be changed tomorrow. May require further debridement of the left foot if wound bed does not improve with Veraflo. Discussed with wife bedside. WBC WNL. Did have a small amount of purulence from the hand earlier today when seen by Dr. Margaret Meyers. Will follow.          Electronically signed by Maikel Morin DPM on 2/8/2022 at 4:59 PM.

## 2022-02-08 NOTE — PROGRESS NOTES
Shift assessment, completed, see flow sheet. Pt is alert, tracking. Not following any commands. Intubated and sedated with a # 7.5 ETT, at 24 LL. On AC 24 / 430 /30 %/ 5. , /87 (111), SpO2 94%. Respirations are easy, even, and unlabored. Bilateral lung sounds rhonchi with crackles in bases. OG in place at 65 to CLWS, green output      RUE PICC, WNL with propofol 15 mcg/kg/min and insulin 2.19 units/hr infusing. R femoral VasCath, WNL. Munoz in place and patent with STAT lock. Rectal tube with dark brown output. Bilateral soft wrist restraints in place for pt safety. Call light within reach. Bed in lowest position. Bed alarm on.  Will continue to monitor

## 2022-02-08 NOTE — PROGRESS NOTES
Comprehensive Nutrition Assessment    Type and Reason for Visit:  Reassess    Nutrition Recommendations/Plan:   1. Continue NPO status until patient is medically cleared to receive nutrition therapy again. 2. Monitor whether TF is resumed + TF rate, intake, and tolerance + water flushes + that one proteinex P2Go TWICE daily is ordered with TF order. 3. Monitor vent status, sedation type/amount (propofol is currently infusing at 15 mcg x 24 hours which = 299 kcals from lipids), TG checks (TG check was 274 mg/dl on 2/7/22), and plan of care. 4. Monitor nutrition-related labs, bowel function + rectal tube output, and weight trends. Nutrition Assessment:  patient is declining from a nutritional standpoint AEB TF is on hold and OG is to [facility] at this time and patient remains at risk for further compromise d/t patient extubated but emergently re-intubated on 2/6/22, need for EN as sole source of nutrition, altered nutrition-related labs, and multiple wounds; will continue NPO status until patient is medically cleared to receive nutrition therapy again    Malnutrition Assessment:  Malnutrition Status:  Mild malnutrition    Context:  Acute Illness     Findings of the 6 clinical characteristics of malnutrition:  Energy Intake:  Mild decrease in energy intake (TF is being held at this time)  Weight Loss:  7 - Greater than 5% over 1 month (- 15# or 5.1% weight loss since 1/24/22; patient is + 13L fluid since admission)     Body Fat Loss:  No significant body fat loss     Muscle Mass Loss:  Unable to assess    Fluid Accumulation:  1 - Mild Extremities (BUE/BLE + 1 pitting edema)   Strength:  Not Performed    Estimated Daily Nutrient Needs:  Energy (kcal):  1386 - 1764 kcals based on 11-14 kcals/kg/CBW; Weight Used for Energy Requirements:  Current     Protein (g):  168 - 185 g protein based on 2.0-2.2 g/kg/IBW;  Weight Used for Protein Requirements:  Ideal        Fluid (ml/day):  1386 - 1764 ml; Method Used for Fluid Requirements:  1 ml/kcal      Nutrition Related Findings:  patient was extubated on 2/6/22 but had to be emergently re-intubated the same day; he is receiving propofol at 15 mcg x 24 hours currently; he responds to voice; + rectal tube output consistent; + wound vac on L foot; last HD was on 2/6/22 and - 1.9L fluid removed at that time; BUE/BLE + 1 pitting edema; abdomen is round, soft, and bowel sounds are active; BUN/Cr, phos, and alk phos are elevated;  Ca is low; GFR = 8; patient has peridex, protonix, precedex, and insulin drip ordered at this time; OG is to [facility] currently and TF is not infusing      Wounds:  Multiple,Burns,Surgical Incision,Deep Tissue Injury (burn on L hand; multiple I & D procedures on L foot (most recently on 2/1/22); SDTI on sacrum)       Current Nutrition Therapies:    Current Tube Feeding (TF) Orders:  · Feeding Route: Orogastric  · Formula: Renal Formula  · Schedule: Continuous  · Additives/Modulars: Protein (one proteinex P2Go TWICE daily via feeding tube)  · Water Flushes: 30 ml every 4 hours for tube patency  · Current TF & Flush Orders Provides: TF on hold at this time  · Goal TF & Flush Orders Provides: Nepro with a goal rate of 40 ml/hr x 20 hours = 800 ml TV, 1440 kcals, 65 g protein, and 582 ml free water + 52 g protein and 208 kcals from one proteinex P2Go TWICE daily via feeding tube (117 g protein and 1648 kcals total) + 30 ml water flushes every 4 hours for tube patency      Anthropometric Measures:  · Height: 6' 1\" (185.4 cm)  · Current Body Weight: 278 lb 7.1 oz (126.3 kg) (obtained on 2/8/22; actual weight)   · Admission Body Weight: 293 lb 4.8 oz (133 kg) (obtained on 1/24/22; actual weight)    · Usual Body Weight: 293 lb 4.8 oz (133 kg) (obtained on 1/24/22; actual weight)     · Ideal Body Weight: 184 lbs; % Ideal Body Weight 151.3 %   · BMI: 36.7   · BMI Categories: Obese Class 2 (BMI 35.0 -39.9)       Nutrition Diagnosis:   · Inadequate oral intake related to inadequate protein-energy intake,impaired respiratory function,increase demand for energy/nutrients,renal dysfunction,endocrine dysfuntion as evidenced by NPO or clear liquid status due to medical condition,intubation,nutrition support - enteral nutrition,poor intake prior to admission,lab values,wounds      Nutrition Interventions:   Food and/or Nutrient Delivery:  Continue NPO  Nutrition Education/Counseling:  No recommendation at this time   Coordination of Nutrition Care:  Continue to monitor while inpatient,Interdisciplinary Rounds    Goals:  patient will tolerate Nepro at goal rate of 40 ml/hr x 20 hours without GI distress, without s/s of aspiration, and without additional lab/fluid disturbances       Nutrition Monitoring and Evaluation:   Behavioral-Environmental Outcomes:  None Identified   Food/Nutrient Intake Outcomes:  Diet Advancement/Tolerance,Enteral Nutrition Intake/Tolerance  Physical Signs/Symptoms Outcomes:  Biochemical Data,Diarrhea,GI Status,Fluid Status or Edema,Hemodynamic Status,Weight,Skin,Nutrition Focused Physical Findings     Discharge Planning:     Too soon to determine     Electronically signed by Netta Pierre RD, LD on 2/8/22 at 9:34 AM EST    Contact: [phone]

## 2022-02-08 NOTE — PROGRESS NOTES
EOS report given to Malgorzata Charles RN.    H&H: Post transfusion Hgb was 6.6 after 1 unit finished that was started by day shift RN. MD [facility] GUILLERMO-ER paged and another unit of PRBC ordered and given. Post transfusion Hgb was then 7.3, no more blood given. BP: SBP staying high, 150s-180s all shift. [facility] BP medicine since pt seems to be calm and sedated well and it does not look to be a sedation issue. No response received, sedation used to lower BP instead. PRN versed and fentanyl given together and it decreased SBP from 190 to 153, but then raised back up to 177 only 15 minutes later, so propofol gtt was increased to help keep BP down. FSBS: Blood sugars stayed in the target range (140-180) until 0600, when it went down to 122. Multiplier decreased from 0.05 to 0.04 at that time. Checked blood sugars and adjusted insulin gtt q2h rather than q1h per MD [facility], which seemed to manage his blood sugar much better than previously. Sedation: Weaned propofol down to 15 mcg/kg/min. Attempted to wean down further (was down to 5) but pt's HR jumped to the 160s-170s, and it looked like he was trying to go into RVR again. PRN versed given and propofol increased back to 10. PRN versed also given after CHG bath/wound care d/t HR up to the 130s. Sedation was increased again to 15 d/t pt's elevated SBP despite no other signs of restlessness. Pt still moving head and opening eyes to voice and is now moving LUE to pain but still not following commands. Pt stable at this time, care transferred.

## 2022-02-08 NOTE — PLAN OF CARE
Nutrition Problem #1: Inadequate oral intake  Intervention: Food and/or Nutrient Delivery: Continue NPO  Nutritional Goals: patient will tolerate Nepro at goal rate of 40 ml/hr x 20 hours without GI distress, without s/s of aspiration, and without additional lab/fluid disturbances

## 2022-02-08 NOTE — PROGRESS NOTES
Shift handoff report given to Jarrett Robison RN at bedside. Pt is sedated on vent  IV handoff completed. 4 eyes completed with Wound care. Call light within reach, bed in lowest position, bed alarm on. End of shift checks completed. Pt has been free of falls for duration of shift. Tila Gardiner
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
## 2022-02-08 NOTE — CARE COORDINATION
INTERDISCIPLINARY PLAN OF CARE CONFERENCE    Date/Time: 2/8/2022 4:12 PM  Completed by:  CHICHO Haley. Case Management      Patient Name:  Jacqueline May  YOB: 1975  Admitting Diagnosis: Hyperglycemia [R73.9]  RODRICK (acute kidney injury) ([facility] [address] [N17.9]  Acute renal failure, unspecified acute renal failure type ([facility] [address] [N17.9]  Syncope, unspecified syncope type [R55]     Admit Date/Time:  1/22/2022  1:32 PM    Chart reviewed. Interdisciplinary team contacted or reviewed plan related to patient progress and discharge plans. Disciplines included Case Management, Nursing, and Dietitian. Current Status:Ongoing. Vent  PT/OT recommendation for discharge plan of care: TBD    Expected D/C Disposition:  TBD    Discharge Plan Comments: Chart review completed. Pt remains in the ICU and is on a Vent. Per CM note from 2/7/2022, [facility] [place] is following for HD slot when appropriate. CM will follow. Please notify CM if needs or concerns arise.

## 2022-02-08 NOTE — PROGRESS NOTES
02/07/22 1915   Vent Information   Vent Type 980   Vent Mode AC/VC   Vt Ordered 430 mL   Rate Set 24 bmp   Peak Flow 75 L/min   FiO2  30 %   SpO2 98 %   SpO2/FiO2 ratio 326.67   Sensitivity 3   PEEP/CPAP 5   Humidification Source Heated wire   Humidification Temp 37   Humidification Temp Measured 37.1   Circuit Condensation Drained   Vent Patient Data   Peak Inspiratory Pressure 17 cmH2O   Mean Airway Pressure 9.2 cmH20   Rate Measured 28 br/min   Vt Exhaled 440 mL   Minute Volume 12.9 Liters   I:E Ratio 1:2.4   Plateau Pressure 17 [id number]   Static Compliance 37 mL/cmH2O   Dynamic Compliance 37 mL/cmH2O   Cough/Sputum   Sputum How Obtained Suctioned;Endotracheal   Sputum Amount Small   Sputum Color Creamy   Tenacity Thick   Spontaneous Breathing Trial (SBT) RT Doc   Pulse 110   Breath Sounds   Right Upper Lobe Diminished   Right Middle Lobe Diminished   Right Lower Lobe Diminished   Left Upper Lobe Diminished   Left Lower Lobe Diminished   Additional Respiratory  Assessments   Resp 29   Position Semi-Springer's   Patient Observation   Observations ETT 7.5   ETT (adult)   Placement Date/Time: 02/05/22 1700   Timeout: Patient;Site/Side;Procedure; Appropriate Equipment  Preoxygenation: Yes  Tube Size: 7.5 mm  Location: Oral  Secured at: 24 cm  Placed By: Licensed provider  Measured From: [facility] at 24 cm   Measured From [address] By [employer] tube wheeler   Site Condition Dry

## 2022-02-08 NOTE — PROGRESS NOTES
Reassessment completed, see flowsheets, unchanged from prior. VSS. Continues on SBT. HD complete     Propofol 10 mcg/kg/min.     All ICU Lines and monitoring remain in place. Bed locked in lowest position. Call light within reach.

## 2022-02-08 NOTE — PROGRESS NOTES
02/08/22 0252   Vent Information   $Ventilation $Subsequent Day   Vent Type 980   Vent Mode AC/VC   Vt Ordered 430 mL   Rate Set 24 bmp   Peak Flow 75 L/min   FiO2  30 %   SpO2 95 %   SpO2/FiO2 ratio 316.67   Sensitivity 3   PEEP/CPAP 5   Vent Patient Data   Peak Inspiratory Pressure 30 cmH2O   Mean Airway Pressure 11 cmH20   Rate Measured 24 br/min   Vt Exhaled 446 mL   Minute Volume 10.7 Liters   I:E Ratio 1:3   Cough/Sputum   Sputum How Obtained Suctioned;Endotracheal   Sputum Amount Small   Sputum Color Creamy   Tenacity Thick   Spontaneous Breathing Trial (SBT) RT Doc   Pulse 103   Breath Sounds   Right Upper Lobe Diminished   Right Middle Lobe Diminished   Right Lower Lobe Diminished   Left Upper Lobe Diminished   Left Lower Lobe Diminished   Additional Respiratory  Assessments   Resp 27   Position Semi-Springer's   ETT (adult)   Placement Date/Time: 02/05/22 1700   Timeout: Patient;Site/Side;Procedure; Appropriate Equipment  Preoxygenation: Yes  Tube Size: 7.5 mm  Location: Oral  Secured at: 24 cm  Placed By: Licensed provider  Measured From: [address]

## 2022-02-08 NOTE — PROGRESS NOTES
02/07/22 2242   Vent Information   Vent Type 980   Vent Mode AC/VC   Vt Ordered 430 mL   Rate Set 24 bmp   Peak Flow 75 L/min   FiO2  30 %   SpO2 95 %   SpO2/FiO2 ratio 316.67   Sensitivity 3   PEEP/CPAP 5   Vent Patient Data   Peak Inspiratory Pressure 20 cmH2O   Mean Airway Pressure 8.9 cmH20   Rate Measured 27 br/min   Vt Exhaled 451 mL   Minute Volume 12.1 Liters   I:E Ratio 1:2.4   Cough/Sputum   Sputum How Obtained Suctioned;Endotracheal   Sputum Amount Small   Sputum Color Creamy   Tenacity Thick   Spontaneous Breathing Trial (SBT) RT Doc   Pulse 110   Breath Sounds   Right Upper Lobe Diminished   Right Middle Lobe Diminished   Right Lower Lobe Diminished   Left Upper Lobe Diminished   Left Lower Lobe Diminished   Additional Respiratory  Assessments   Resp 30   Position Semi-Springer's   ETT (adult)   Placement Date/Time: 02/05/22 1700   Timeout: Patient;Site/Side;Procedure; Appropriate Equipment  Preoxygenation: Yes  Tube Size: 7.5 mm  Location: Oral  Secured at: 24 cm  Placed By: Licensed provider  Measured From: Lips   ET Placement Left

## 2022-02-08 NOTE — FLOWSHEET NOTE
Treatment time: 3hrs    Net UF: 2 liters    Pre weight: 126.3kg  Post weight: 124.6kg bedscale(2pillows and boot  EDW: TBD    Access used: RFem Vascath  Access function: Lines reversed and occassional positional.    Medications or blood products given: Heparin dwells    Regular outpatient schedule: TBD    Summary of response to treatment:      02/08/22 1530   Vital Signs   BP (!) 155/75   Temp 98.8 °F (37.1 °C)   Pulse 102   Resp (!) 31   SpO2 96 %   Weight 274 lb 11.1 oz (124.6 kg)   Weight Method Bed scale   Percent Weight Change -1.35   Pain Assessment   Pain Assessment CPOT   Pain Level 0   Post-Hemodialysis Assessment   Post-Treatment Procedures Blood returned;Catheter capped, clamped and heparinized x 2 ports   Machine Disinfection Process Acid/Vinegar Clean;Heat Disinfect; Exterior Machine Disinfection   Rinseback Volume (ml) 400 ml   Total Liters Processed (l/min) 63.9 l/min   Dialyzer Clearance Moderately streaked   Duration of Treatment (minutes) 180 minutes   Heparin amount administered during treatment (units) 0 units   Hemodialysis Intake (ml) 400 ml   Hemodialysis Output (ml) 2453 ml   NET Removed (ml) 2053 ml   Tolerated Treatment Good   Patient Response to Treatment No issues. Vss. Bilateral Breath Sounds Diminished   Edema Generalized   Physician Notified? Yes   Copy of dialysis treatment record placed in chart, to be scanned into EMR.

## 2022-02-08 NOTE — PROGRESS NOTES
Reassessment completed, see flowsheets, unchanged from prior. VSS. Continues on SBT. Propofol 10 mcg/kg/min. Insulin gtt stopped. HD starting. All ICU Lines and monitoring remain in place. Bed locked in lowest position. Call light within reach.

## 2022-02-09 PROBLEM — D72.829 LEUKOCYTOSIS: Status: RESOLVED | Noted: 2022-01-01 | Resolved: 2022-01-01

## 2022-02-09 NOTE — PROGRESS NOTES
Nephrology Progress Note   KHares. [employer]      This patient is a 42year old male whom we are following for RODRICK, HD dependent. Subjective:  Patient is alert but does not follow  HD on 2/8 with 2 L UF, post with 1.4 0.6 kg  Urine output has diminished today    Extubated 2/9 wa Reintubated over the weekend of 2/5.s    last 24-hour urine output of 800 mL    Family History: No family at bedside  ROS: Unable to obtain since intubated      Vitals:  BP (!) 184/79   Pulse 85   Temp 100.3 °F (37.9 °C) (Bladder)   Resp 28   Ht 6' 1\" (1.854 m)   Wt 276 lb 14.4 oz (125.6 kg)   SpO2 96%   BMI 36.53 kg/m²   I/O last 3 completed shifts: In: 2106.2 [I.V.:551.1; Blood:352.8; NG/GT:492; IV Piggyback:310.3]  Out: 4391 [Urine:1410; Emesis/NG output:50; ANFPH:8049]  I/O this shift: In: 462.4 [I.V.:85.4; NG/GT:277; IV Piggyback:100]  Out: 82 [Urine:82]    Physical Exam:  Gen: Awake  Cardiovascular:  S1, S2 without m/r/g 1+ lower extremity edema  Respiratory: Coarse   abdomen:  soft, nt, nd,   Neuro/Psy: Alert and awake  Access: R femoral vas cath      Medications:   insulin glargine  25 Units SubCUTAneous BID    carvedilol  6.25 mg Oral BID     insulin lispro  0-18 Units SubCUTAneous Q4H    lidocaine 1 % injection  5 mL IntraDERmal Once    sodium chloride flush  5-40 mL IntraVENous 2 times per day    povidone-iodine   Topical Daily    pantoprazole  40 mg IntraVENous Daily    heparin (porcine)  5,000 Units SubCUTAneous 3 times per day    ampicillin-sulbactam  3,000 mg IntraVENous Q12H    Venelex   Topical BID    sodium chloride flush  5-40 mL IntraVENous 2 times per day         Labs:  Recent Labs     02/07/22  0403 02/07/22  0403 02/07/22  2231 02/08/22  0319 02/09/22  0400   WBC 11.7*  --   --  9.6 8.8   HGB 6.7*   < > 6.6* 7.3* 7.5*   HCT 20.2*   < > 19.3* 22.1* 22.1*   MCV 91.2  --   --  90.3 89.9   *  --   --  413 404    < > = values in this interval not displayed.      Recent Labs     02/07/22  0400 02/08/22  0319 02/09/22  0400    140 134*   K 4.3 4.6 4.5    102 96*   CO2 19* 18* 21   GLUCOSE 139* 149* 227*   PHOS 9.7* 11.5* 7.7*   BUN 69* 85* 64*   CREATININE 6.6* 7.7* 6.0*   LABGLOM 9* 8* 10*   GFRAA 11* 9* 12*       Assessment/      RODRICK likely related to prerenal factors in the setting of sepsis, use of diuretics and losartan contributing to multifactorial ATN. UA is not suggestive of staph associated glomerulonephritis.  Patient did not respond to IV fluids and developed acute pulmonary edema and renal replacement therapy initiated on 1/23/22        on HD      -Acute pulmonary edema              Status post intubation, resolved              On vent      -Acute hypoxic respiratory failure     -Cardio respiratory arrest       -Hyperkalemia     -Sepsis with MSSA bacteremia with left foot abscess s/p drainage, osteomyelitis, left hand I&D     -Anemia - prn prbc's     -Diabetes, uncontrolled    -Hypertension     Plan/   -Follow signs of renal recovery  -We will assess daily for dialysis need  -Tunneled dialysis catheter soon if no signs of renal recovery  -Follow blood cultures from 2/7  -Serial renal panel  -daily wts and strict i/o  -renal dose medications   -avoid nephrotoxins    Please do not hesitate to contact me at [phone] if with questions. Thank you! Chemo Hensley MD  [facility]  2/9/2022
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
## 2022-02-09 NOTE — PROGRESS NOTES
02/08/22 1941   Vent Information   Vent Type 980   Vent Mode PS   Vt Ordered 0 mL   Rate Set 0 bmp   Peak Flow 0 L/min   Pressure Support 10 cmH20   FiO2  30 %   SpO2 93 %   SpO2/FiO2 ratio 310   Sensitivity 2   PEEP/CPAP 5   I Time/ I Time % 0 s   Vent Patient Data   High Peep/I Pressure 0   Peak Inspiratory Pressure 17 cmH2O   Mean Airway Pressure 8.8 cmH20   Rate Measured 30 br/min   Vt Exhaled 511 mL   Minute Volume 14 Liters   I:E Ratio 1:3.30   Cough/Sputum   Sputum How Obtained Suctioned;Endotracheal   Sputum Amount Moderate   Sputum Color Creamy   Tenacity Thick   Spontaneous Breathing Trial (SBT) RT Doc   Pulse 101   Breath Sounds   Right Upper Lobe Diminished   Right Middle Lobe Diminished   Right Lower Lobe Diminished   Left Upper Lobe Diminished   Left Lower Lobe Diminished   Additional Respiratory  Assessments   Resp (!) 32   Position Semi-Springer's   Alarm Settings   High Pressure Alarm 45 cmH2O   Low Minute Volume Alarm 3 L/min   High Respiratory Rate 45 br/min   Patient Observation   Observations ETT 7.5   ETT (adult)   Placement Date/Time: 02/05/22 1700   Timeout: Patient;Site/Side;Procedure; Appropriate Equipment  Preoxygenation: Yes  Tube Size: 7.5 mm  Location: Oral  Secured at: 24 cm  Placed By: Licensed provider  Measured From: Lips   Secured at 24 cm   Measured From Lips   ET Placement Left   Secured By Commercial tube wheeler   Site Condition Dry

## 2022-02-09 NOTE — PROGRESS NOTES
Reassessment completed, see flowsheets, unchanged from prior. Pt alert and responding appropriately. Pt mouth swabbed, coughing after each swab. Was able to get water up and reluctantly allowed RN to suction him. Currently on 2L/NC.      All ICU Lines and monitoring remain in place. Bed locked in lowest position.  Call light within reach.

## 2022-02-09 NOTE — PROGRESS NOTES
Care rounds completed with Dr. Geraldine Dubin and multidisciplinary team. Reviewed labs, meds, VS, assessment, & plan of care for today. See dictated note and new orders for details.      Extubate

## 2022-02-09 NOTE — PROGRESS NOTES
02/09/22 0321   Vent Information   Vent Type 980   Vent Mode PS   Vt Ordered 0 mL   Rate Set 0 bmp   Peak Flow 0 L/min   Pressure Support 8 cmH20   FiO2  30 %   SpO2 95 %   SpO2/FiO2 ratio 316.67   Sensitivity 2   PEEP/CPAP 5   I Time/ I Time % 0 s   Vent Patient Data   High Peep/I Pressure 0   Peak Inspiratory Pressure 14 cmH2O   Mean Airway Pressure 8.4 cmH20   Rate Measured 34 br/min   Vt Exhaled 393 mL   Minute Volume 13.4 Liters   I:E Ratio 1:1.90   Cough/Sputum   Sputum How Obtained Suctioned;Endotracheal   Sputum Amount Moderate   Sputum Color Creamy   Tenacity Thick   Spontaneous Breathing Trial (SBT) RT Doc   Pulse 107   Breath Sounds   Right Upper Lobe Diminished   Right Middle Lobe Diminished   Right Lower Lobe Diminished   Left Upper Lobe Diminished   Left Lower Lobe Diminished   Additional Respiratory  Assessments   Resp (!) 35   Position Semi-Springer's   Alarm Settings   High Pressure Alarm 45 cmH2O   Low Minute Volume Alarm 3 L/min   High Respiratory Rate 45 br/min   ETT (adult)   Placement Date/Time: 02/05/22 1700   Timeout: Patient;Site/Side;Procedure; Appropriate Equipment  Preoxygenation: Yes  Tube Size: 7.5 mm  Location: Oral  Secured at: 24 cm  Placed By: Licensed provider  Measured From: [address]

## 2022-02-09 NOTE — FLOWSHEET NOTE
02/09/22 1134   Negative Pressure Wound Therapy Foot Left;Dorsal   Placement Date/Time: 02/02/22 1100   Pre-existing: No  Inserted by: Esteban HUNTLEY  Location: Foot  Wound Location Orientation: Left;Dorsal   $ Standard NPWT >50 sq cm PER TX $ Yes   Wound Type Surgical;Other (Comment)   Unit Type Vac Ulta with Veraflo   Dressing Type Black foam   Cycle Continuous   Target Pressure (mmHg) 125   Intensity 1   Irrigation Solution Sodium chloride 0.9%   Instillation Volume  14 mL   Soak Time  3 minutes   Vac Frequency 2 hours   Dressing Status Changed   Dressing Changed Changed/New   Drainage Amount Small   Drainage Description Serosanguinous   Left dorsal foot:  Exposed viable tendon and bone, 50% yellow and brown nonviable tissue, 50% red, moist granular tissue. Surrounding skin intact, edematous. No odor or purulence noted. One black foam  removed. Wound cleansed with NS, patted dry. Periwound skin prepped using barrier wipe and vac drape. One black foam used in wound bed, good seal obtained at 125mm hg continuous. Same Veraflo settings. Dermafit compression stocking applied to LLE from toes to knee gatch. Used compression stocking instead of aces. Aces are slipping down and causing champagne bottle effect on LLE. Next VAC change on Friday. Left hand:  Decreased brown nonviable tissue in tunnels, tracks approximately 2 cm towards 12 and 2 o'clock. Red, granulation tissue present. No odor or purulence noted. Wounds discussed with Dr ROBIN Espinoza and Dr Farideh Magaña.   Tyler managing dressings to left hand per dr Deepali Olivo

## 2022-02-09 NOTE — PROGRESS NOTES
Shift assessment, completed, see flow sheet. Pt is alert, tracking, nodding appropriately and blinking yes/no to questions     Intubated and sedated with a # 7.5 ETT, at 24 LL. On SBT 8/5 30%. , /84 (111), SpO2 95%. Respirations are easy, even, and unlabored. Bilateral lung sounds rhonchus. OG in place at 65, TF 40 ml/hr. TF stopped for possible extubation. RUE PICC, WNL with propofol 10 mcg/kg/min. Propofol stopped for possible extubation. R femoral VasCath, WNL.     Munoz in place and patent with STAT lock. Rectal tube with dark brown output. Bilateral soft wrist restraints in place for pt safety.      Call light within reach. Bed in lowest position. Bed alarm on.  Will continue to monitor

## 2022-02-09 NOTE — PROGRESS NOTES
Pt extubated to [facility] at 8lpm       02/09/22 1220   Oxygen Therapy/Pulse Ox   O2 Therapy Oxygen humidified   O2 Device High flow nasal cannula   O2 Flow Rate (L/min) 8 L/min   Resp (!) 33   SpO2 97 %

## 2022-02-09 NOTE — CARE COORDINATION
INTERDISCIPLINARY PLAN OF CARE CONFERENCE    Date/Time: 2/9/2022 9:42 AM  Completed by: CHICHO Ayala. Case Management      Patient Name:  Alexa Ley  YOB: 1975  Admitting Diagnosis: Hyperglycemia [R73.9]  RODRICK (acute kidney injury) ([facility] [address] [N17.9]  Acute renal failure, unspecified acute renal failure type ([facility] [address] [N17.9]  Syncope, unspecified syncope type [R55]     Admit Date/Time:  1/22/2022  1:32 PM    Chart reviewed. Interdisciplinary team contacted or reviewed plan related to patient progress and discharge plans. Disciplines included Case Management, Nursing, and Dietitian. Current Status:ongoing. ICU. Vent  PT/OT recommendation for discharge plan of care: TBD    Expected D/C Disposition:  TBD    Discharge Plan Comments: Chart review completed. Pt remains in the ICU and is on a Vent. No visitors at bedside when [address] went to pt's room. Spoke with pt's RN. Message left for pt's wife Robert Orosco requesting a call back. Home O2 in place on admit: No    Addendum at 9:50am: Received call back from pt's wife Robert Orosco. Pt lives with her and was independent prior to admission, used no DME or HHC and worked. Discussed the potential need for outpatient HD. Robert Orosco states this has been discussed and she is okay with davita if needed; they live in [place]. No questions or concerns expressed. CM contact number given and she was encouraged to call with questions or concerns.  Explained CM will follow for possible PT/OT recommendations, etc

## 2022-02-09 NOTE — PROGRESS NOTES
Reassessment completed, see flowsheets, unchanged from prior. VSS. Continues on SBT on 5/5. RASS 0.     Precedex 0.2 mcg/kg/hr. Stopped for extubation.      All ICU Lines and monitoring remain in place. Bed locked in lowest position. Call light within reach.
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
## 2022-02-09 NOTE — PROGRESS NOTES
Speech Language Pathology Attempt    1400: Orders received, chart reviewed. Note pt extubated 2 hours ago after prolonged intubation. MD with pt's family at the bedside at this time. Will return for assessment as pt is available and when alert enough to participate in PO assessment. 1530: Second attempt. Pt alert throughout the visit. Pt did not respond verbally to SLP or to sister and followed no commands. Pt turned his head away from SLP and closed his lips lightly when offered ice chips or water. Provided rationale for swallow assessment related to his prolonged intubation. Sister at bedside providing encouragement to participate in assessment but pt stated \"No\". Note hoarse/rough vocal quality. Pt is demonstrating spontaneous saliva swallows. Unable to assess prandial swallow at this time. Will return for assessment as appropriate. No charges filed. Milan Alan MS CCC-SLP  Speech Language Pathologist   Phone [phone]

## 2022-02-09 NOTE — PROGRESS NOTES
02/08/22 2346   Vent Information   Vent Type 980   Vent Mode PS   Vt Ordered 0 mL   Rate Set 0 bmp   Peak Flow 0 L/min   Pressure Support 10 cmH20   FiO2  30 %   SpO2 94 %   SpO2/FiO2 ratio 313.33   Sensitivity 2   PEEP/CPAP 5   I Time/ I Time % 0 s   Vent Patient Data   High Peep/I Pressure 0   Peak Inspiratory Pressure 16 cmH2O   Mean Airway Pressure 8.69 cmH20   Rate Measured 34 br/min   Vt Exhaled 427 mL   Minute Volume 13.8 Liters   I:E Ratio 1:2.30   Cough/Sputum   Sputum How Obtained Suctioned;Endotracheal   Sputum Amount Small   Sputum Color Creamy   Tenacity Thick   Spontaneous Breathing Trial (SBT) RT Doc   Pulse 110   Breath Sounds   Right Upper Lobe Diminished   Right Middle Lobe Diminished   Right Lower Lobe Diminished   Left Upper Lobe Diminished   Left Lower Lobe Diminished   Additional Respiratory  Assessments   Resp (!) 34   Position Semi-Springer's   Alarm Settings   High Pressure Alarm 45 cmH2O   Low Minute Volume Alarm 3 L/min   High Respiratory Rate 45 br/min   ETT (adult)   Placement Date/Time: 02/05/22 1700   Timeout: Patient;Site/Side;Procedure; Appropriate Equipment  Preoxygenation: Yes  Tube Size: 7.5 mm  Location: Oral  Secured at: 24 cm  Placed By: Licensed provider  Measured From: Lips   ET Placement Right

## 2022-02-09 NOTE — FLOWSHEET NOTE
02/09/22 1822   Vitals   BP (!) 205/79   MAP (mmHg) 113     Dr. Miguel Baeza made aware.    New order to change labetalol to 20 mg q4h for SBP greater than 160

## 2022-02-09 NOTE — PROGRESS NOTES
Pt remains intubated with ETT # 7.5 LL 24  Vent settings P.S 10/5. FiO2 30%  Propofol at 10 mcg/kg/min. ST on the monitor. 's. Temperature 99.9 via bladder probe. Right PICC and Right Vascath in place, Dressing intact. Flexi seal  and rea in place, hematuria noticed with out clots. OG at 65 cm with Nepro at goal at 40 ml/hr water flush 30 ml Q 4 H. Left foot has wound vac in place. Left hand dressing done. Pt able to tract RN and able to blink his eyes, not following commands yet.

## 2022-02-09 NOTE — PROGRESS NOTES
[facility] Infectious Disease Progress Note      Mike Bingham     : 1975    DATE OF VISIT:  2022  DATE OF ADMISSION:  2022       Subjective:     Mike Bingham is a 42 y.o. male whom I've been seeing for a deep MSSA foot infection, deep MSSA / Enterococcal hand infection, with multiorgan failure. Since I last saw him, not a lot of clinical change. Rather alert this AM, on the vent, just PS8 and 30% FIO2. Seems to be aware of me, I think blinked to command, not really nodding yes or no to questions, not following commands with his extremities. Still some diarrhea, definitely more urine output. Mr. Jeannine Koenig has a past medical history of Abscess of left foot, Acute osteomyelitis of right hallux ([address] Cellulitis of left foot, CHF (congestive heart failure) ([address] Chronic osteomyelitis of left foot ([address] Closed displaced fracture of distal phalanx of right little finger, Clostridium difficile infection, Diabetic ulcer of left forefoot associated with type 2 diabetes mellitus, with necrosis of bone ([address] Diabetic ulcer of right great toe associated with type 2 diabetes mellitus, with necrosis of bone ([address] Failed soft 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 toe amputation site, History of hyperbaric oxygen therapy, Migraine, Possible perforated tympanic membrane, Post-op hematoma of left foot, Recurrent otitis media, Septic shock ([address] Syncope, Tear of medial meniscus of left knee, Tobacco use, and Toe osteomyelitis, left ([address]     Current Facility-Administered Medications: insulin glargine (LANTUS) injection vial 25 Units, 25 Units, SubCUTAneous, BID  carvedilol (COREG) tablet 6.25 mg, 6.25 mg, Oral, BID WC  insulin lispro (HUMALOG) injection vial 0-18 Units, 0-18 Units, SubCUTAneous, Q4H  lidocaine PF 1 % injection 5 mL, 5 mL, IntraDERmal, Once  sodium chloride flush 0.9 % injection 5-40 mL, 5-40 mL, IntraVENous, 2 times per day  sodium chloride flush 0.9 % injection 5-40 mL, 5-40 mL, IntraVENous, PRN  0.9 % sodium chloride infusion, 25 mL, IntraVENous, PRN  povidone-iodine (BETADINE) 10 % external solution, , Topical, Daily  0.9 % sodium chloride infusion, , IntraVENous, PRN  pantoprazole (PROTONIX) injection 40 mg, 40 mg, IntraVENous, Daily  heparin (porcine) injection 5,000 Units, 5,000 Units, SubCUTAneous, 3 times per day  0.9 % sodium chloride infusion, , IntraVENous, PRN  propofol injection, 5-50 mcg/kg/min, IntraVENous, Titrated  heparin (porcine) injection 3,000 Units, 3,000 Units, IntraVENous, PRN  ampicillin-sulbactam (UNASYN) 3000 mg ivpb minibag, 3,000 mg, IntraVENous, Q12H  oxyCODONE-acetaminophen (PERCOCET) 5-325 MG per tablet 1 tablet, 1 tablet, Oral, Q4H PRN **OR** oxyCODONE-acetaminophen (PERCOCET) 5-325 MG per tablet 2 tablet, 2 tablet, Oral, Q4H PRN  sodium chloride 0.9 % irrigation 1,000 mL, 1,000 mL, Irrigation, Continuous PRN  fentaNYL (SUBLIMAZE) injection 50 mcg, 50 mcg, IntraVENous, Q1H PRN  Venelex ointment, , Topical, BID  albumin human 25 % IV solution 12.5 g, 12.5 g, IntraVENous, PRN  heparin (porcine) injection 2,600 Units, 2,600 Units, IntraCATHeter, PRN  dexmedetomidine (PRECEDEX) 400 mcg in sodium chloride 0.9 % 100 mL infusion, 0.2-2 mcg/kg/hr, IntraVENous, Continuous  midazolam (VERSED) injection 2 mg, 2 mg, IntraVENous, Q1H PRN  sodium chloride flush 0.9 % injection 5-40 mL, 5-40 mL, IntraVENous, 2 times per day  sodium chloride flush 0.9 % injection 5-40 mL, 5-40 mL, IntraVENous, PRN  0.9 % sodium chloride infusion, 25 mL, IntraVENous, PRN  acetaminophen (TYLENOL) tablet 650 mg, 650 mg, Oral, Q6H PRN **OR** acetaminophen (TYLENOL) suppository 650 mg, 650 mg, Rectal, Q6H PRN  glucose (GLUTOSE) 40 % oral gel 15 g, 15 g, Oral, PRN  glucagon (rDNA) injection 1 mg, 1 mg, IntraMUSCular, PRN  dextrose 5 % solution, 100 mL/hr, IntraVENous, PRN  dextrose bolus (hypoglycemia) 10% 125 mL, 125 mL, IntraVENous, PRN **OR** dextrose bolus (hypoglycemia) 10% 250 mL, 250 mL, IntraVENous, PRN  chlorhexidine (PERIDEX) 0.12 % solution 15 mL, 15 mL, Mouth/Throat, BID     This is day 7 of Unasyn, so day 7 of Enterococcal therapy, day 18 of MSSA therapy, POD 8 for the foot, POD 4 for the hand. Allergies: Januvia [sitagliptin], Metformin and related, Vancomycin, and Mustard oil [allyl isothiocyanate]    Pertinent items from the review of systems are discussed in the HPI; the remainder of the ROS was reviewed and is negative.      Objective:     Vital signs over the last 24 hours:  Temp  Av.2 °F (37.3 °C)  Min: 98.6 °F (37 °C)  Max: 100 °F (37.8 °C)  Pulse  Av  Min: 97  Max: 385  Systolic (90PCG), CXV:347 , Min:127 , AKC:450   Diastolic (56LSB), RRI:60, Min:74, Max:88  Resp  Av.2  Min: 23  Max: 48  SpO2  Av.3 %  Min: 92 %  Max: 97 %    Constitutional:  well-developed, well-nourished, sedated on the vent, but opened eyes during my visit, does seem a bit more alert and aware  Eyes:  pupils equal, round, reactive to light; sclerae anicteric, conjunctivae not pale  ENT:  atraumatic; no labial ulcers; orally intubated  Resp:  a bit more coarse with some rhonchi today, decreased at the bases  Cardiovascular:  heart regular, a bit tachy again now, no murmur; generally mild extremity edema; no IV phlebitis; right PICC in place, and a right femoral CRRT line, exit sites benign  GI:  abdomen soft, NT, distended, hypoactive bowel sounds, no palpable masses or organomegaly; rectal tube now in place with more diarrhea  : Munoz in place, small amount of more yellow and clearer urine now   Musculoskeletal:  no clubbing, cyanosis or petechiae; extremities with no gross effusions or acute arthritis  Skin: warm, dry, normal turgor; no acute rash, no peripheral stigmata of endocarditis. I didn't examine his wounds this AM.   ______________________________    Recent Labs     22  0400 22  0319 22  2231 22  0403 22  0403   WBC 8.8 9.6  --   --  11.7*   HGB 7.5* 7.3* 6.6* < > 6.7*   HCT 22.1* 22.1* 19.3*   < > 20.2*   MCV 89.9 90.3  --   --  91.2    413  --   --  459*    < > = values in this interval not displayed. Lab Results   Component Value Date    CREATININE 6.0 (HH) 02/09/2022     Lab Results   Component Value Date    LABALBU 2.4 (L) 02/09/2022     Lab Results   Component Value Date    ALT <5 (L) 02/09/2022    AST 11 (L) 02/09/2022     (H) 01/31/2022    ALKPHOS 254 (H) 02/09/2022    BILITOT 0.5 02/09/2022      Lab Results   Component Value Date    LABA1C 10.6 01/23/2022     Other recent pertinent labs:  Glucoses 100s - 200s (off insulin drip). Anion gap 17. Automated diff normal.   ______________________________    Recent pertinent micro results:  Most recent BCx (-), UCx (-), RCx with Candida. Hand OR Cx anaerobic pending.   ______________________________    Recent imaging results (last 7 days):     XR HAND LEFT (MIN 3 VIEWS)    Result Date: 2/3/2022  No radiographic evidence of osteomyelitis with technical limitations as above. MRI HAND LEFT WO CONTRAST    Result Date: 2/4/2022  1. Osteomyelitis of the 3rd metacarpal head tapering into the mid shaft. Osteomyelitis of the 3rd proximal phalangeal base and shaft. Moderate 3rd metacarpophalangeal joint effusion compatible with septic arthritis. 2. Mild marrow edema in the 5th metacarpal head and 5th proximal phalangeal base with normal T1 signal compatible with noninfectious reactive osteitis versus less likely early osteomyelitis. 3. Extensive subcutaneous edema compatible with cellulitis. 3 x 2.6 x 0.6 cm abscess versus phlegmon in the soft tissues dorsal to the 4th and 5th metacarpals. 4. Extensive edema within the interosseous musculature compatible with myositis. 5. Mild ulnar palmar bursitis distal to the carpal tunnel.      XR CHEST PORTABLE    Result Date: 2/9/2022  Low volume study with no significant interval change in diffuse bilateral opacity which can reflect pulmonary edema or pneumonia. Pearley Hallmark, and I think maybe less of a left effusion, on my review]    XR CHEST PORTABLE    Result Date: 2/8/2022  Interval decrease in left-sided pleural effusion. XR CHEST PORTABLE    Result Date: 2/7/2022  1. Unchanged position of support devices. 2. No significant change in bilateral airspace disease. XR CHEST PORTABLE    Result Date: 2/7/2022  Improvement of the previous seen left upper lobe airspace disease. Lines and tubes stable. XR CHEST PORTABLE    Result Date: 2/6/2022  Stable examination with layering left pleural effusion and right perihilar airspace disease. Pulmonary edema and pneumonia are in the differential.     XR CHEST PORTABLE    Result Date: 2/5/2022  1. Endotracheal tube 5 cm above the ariadna an NG tube extends into the mid to distal stomach. The right internal jugular central venous line is unchanged. 2.  Opacity and volume loss of the left hemithorax which has worsened suggesting pneumonia a possibly some atelectasis. Ovoid area of opacity in the right middle lower lung field suggesting additional area of pneumonitis. 3.  Possible left pleural effusion. 4.  Cardiomegaly, unchanged. XR CHEST PORTABLE    Result Date: 2/4/2022  Multifocal opacities in the left lung likely with some left basilar pleural effusion/atelectasis is again noted. The less prominent opacities in the right lung are also stable. ET, NG, and right jugular line appear acceptable. XR CHEST PORTABLE    Result Date: 2/3/2022  1. No significant change. XR CHEST PORTABLE    Result Date: 2/2/2022  Increasing airspace opacification in the right lower lung zone that may represent underlying pneumonitis. Asymmetric edema may also be considered in this intubated patient. IR PICC WO SQ PORT/PUMP > 5 YEARS    Result Date: 2/7/2022  Successful placement of PICC line.       Assessment:     Patient Active Problem List   Diagnosis Code    Hypertension I10    Uncontrolled type 2 diabetes mellitus with diabetic polyneuropathy ([facility]) E11.42, E11.65    Cardiomyopathy ([facility] I42.9    Mixed hyperlipidemia E78.2    Allergic rhinitis J30.9    Osteoarthritis M19.90    Acute osteomyelitis of left foot ([facility]) M86.172    Acute renal failure (H[facility]) N17.9    MSSA bacteremia R78.81, B95.61    Leukocytosis D72.829    Third degree burn of left hand T23.302A    Acute hypoxemic respiratory failure ([facility]) J96.01    Cardiopulmonary arrest ([facility]) I46.9    Hypertriglyceridemia E78.1    Staphylococcal arthritis of left foot ([facility]) M00.072    Antibiotic-associated diarrhea K52.1, T36.95XA    Acute encephalopathy G93.40    Infective tenosynovitis of extensor tendons of left hand M65.142    Enterococcal infection A49.1    Abscess of left hand L02.512    Acute osteomyelitis of left hand ([facility]) M86.142    Pressure injury of deep tissue of sacral region L89.156    Mild protein-calorie malnutrition ([facility]) E44.1     Assessment of today's active condition(s):      --          Background of uncontrolled DM2, neuropathy, no known PAD, multiple prior diabetic foot ulcers, infections, surgeries. Most recent wounds were at the left 1st and 2nd ray, but they had been healed for just about a year.      --          Admission this time with a fairly nonfocal sepsis syndrome, at least in terms of symptoms, complicated by shock, acute renal failure, lactic acidosis, then cardiac arrest, resuscitation, acute respiratory failure, rapid Afib. Currently with vent support, sedation, insulin drip, and on intermittent HD. Found to have MSSA bacteremia, and a sizeable MSSA foot abscess, septic midfoot arthritis and acute osteo of at least a couple of midfoot bones / metatarsal bases.  A week post-op, the wound was looking somewhat better, more granulation, tendons viable, in need of some additional debridement at some point.      --          Ongoing hypoxemic respiratory failure, I think at least in part due to CHF / fluid overload after cardiac arrest. Still stubborn oliguric renal failure, azotemia, elevated phos, etc. Urine output started to  these last few days. FIO2 better also.     --          Third degree burn of left hand, with purulence beneath the lysing eschar seen to be running along extensor tendons - MRI and clinical exams c/w soft tissue abscess, septic tenosynovitis, possible acute septic arthritis at the 3rd MCPJ, with adjacent acute metacarpal and phalangeal osteo. Definitely need to treat the Enterococcus, with it isolated from OR Cxs. Still some purulence along tendon tracks yesterday - I'll reexamine and re-dress that tomorrow.      --          SIRS / sepsis finally seems to be turning the corner in the last few days, after OR debridement and drainage of that left hand infection.     --          Mental status had been improving somewhat, though a little concern that he's not following commands with (or generally moving) his extremities; still on Propofol, varying amounts during the day. -- Colonization with Candida, not surprising given DM, prior steroids, extensive Abx Rx.     -- Unstageable pressure ulcer of sacrum -- conservative Rx for now. Treatment recs:     Continue Unasyn. Await final OR and BCx. No need to Rx the Candida unless he has thrush. Ongoing foot wound care per Dr. Quang Elias and Bharath Green. I'll check that left hand closely tomorrow, and hopefully he won't have to go back to the OR for additional incision, exploration, drainage, etc.    Would prefer to get the femoral line out as soon as we can, replacing it with an IJ HD catheter, whether tunneled or not, depending on how long nephrology thinks his dialysis dependence might continue.      Electronically signed by Joe German MD on 2/9/2022 at 7:55 AM.

## 2022-02-10 NOTE — CARE COORDINATION
INTERDISCIPLINARY PLAN OF CARE CONFERENCE    Date/Time: 2/10/2022 9:49 AM  Completed by:  CHICHO Bryson. Case Management      Patient Name:  Alisha Ortiz  YOB: 1975  Admitting Diagnosis: Hyperglycemia [R73.9]  RODRICK (acute kidney injury) ([facility] [address] [N17.9]  Acute renal failure, unspecified acute renal failure type ([address] [N17.9]  Syncope, unspecified syncope type [R55]     Admit Date/Time:  1/22/2022  1:32 PM    Chart reviewed. Interdisciplinary team contacted or reviewed plan related to patient progress and discharge plans. Disciplines included Case Management, Nursing, and Dietitian. Current Status:Ongoing. Extubated 2/9/22. 2 liters o2. PT/OT recommendation for discharge plan of care: TBD-will be beneficial when appropriate     Expected D/C Disposition:  TBD    Discharge Plan Comments: Chart review completed. RN aware pt is awaiting PT/OT eval as orders are in epic. Spoke with Obdulia Montez, admissions at [facility] ([phone]) notifying her that pt is now extubated so they can review pt's chart for possible outpatient HD. Obdulia Montez stated she will notify [facility] as she has been following pt's case. CM will continue to follow and assist. Please notify CM if needs or concerns arise.      Home O2 in place on admit: No

## 2022-02-10 NOTE — PROGRESS NOTES
Speech Language Pathology    Clinical Bedside Swallow Assessment    Facility/Department: [facility] ICU    Instrumentation: Yes, however, not appropriate at this time. Will address once pt is tolerating some PO at bedside and is able to follow commands.   Diet recommendation: NPO; Ice chips after oral care to prevent disuse atrophy; Meds via alt means of nutrition  Risk management: Control risk factors for aspiration PNA by completing oral care 3-4x/day and increasing physical mobility as is medically feasible    NAME:Clint Osei  : 1975 (41 y.o.)   MRN: [id number]  ROOM: [facility]7-  ADMISSION DATE: 2022  PATIENT DIAGNOSIS(ES): Hyperglycemia [R73.9]  RODRICK (acute kidney injury) ([address] [N17.9]  Acute renal failure, unspecified acute renal failure type ([address] [N17.9]  Syncope, unspecified syncope type [R55]  Chief Complaint   Patient presents with    Altered Mental Status     Pt with brief LOC PTA     Patient Active Problem List    Diagnosis Date Noted    Mild protein-calorie malnutrition ([address] 2022    Acute osteomyelitis of left hand ([address] 2022    Pressure injury of deep tissue of sacral region 2022    Abscess of left hand     Infective tenosynovitis of extensor tendons of left hand 2022    Enterococcal infection 2022    Staphylococcal arthritis of left foot ([address] 2022    Antibiotic-associated diarrhea 2022    Acute encephalopathy     Hypertriglyceridemia     MSSA bacteremia 2022    Third degree burn of left hand 2022    Acute hypoxemic respiratory failure ([address]     Cardiopulmonary arrest ([address]     Acute renal failure ([address] 2022    Acute osteomyelitis of left foot ([address] 10/26/2020    Allergic rhinitis 2020    Osteoarthritis     Mixed hyperlipidemia 2016    Cardiomyopathy ([address] 2014    Hypertension     Uncontrolled type 2 diabetes mellitus with diabetic polyneuropathy ([address]      Past Medical History: Diagnosis Date    Abscess of left foot 1/24/2022    Acute osteomyelitis of right hallux ([address] 08/2019    Cellulitis of left foot 7/26/2020    CHF (congestive heart failure) ([address] 09/2014    presumably from viral myocarditis    Chronic osteomyelitis of left foot ([address] 10/27/2020    Closed displaced fracture of distal phalanx of right little finger 4/8/2021    Clostridium difficile infection 11/01/2016    Diabetic ulcer of left forefoot associated with type 2 diabetes mellitus, with necrosis of bone ([address] 8/1/2019    Diabetic ulcer of right great toe associated with type 2 diabetes mellitus, with necrosis of bone ([address] 08/2019    Failed soft 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 toe amputation site 10/26/2020    History of hyperbaric oxygen therapy 10/28/2020    [facility]- carmichael 3 - compromised flap    Migraine     Possible perforated tympanic membrane     as a result of recurrent otitis    Post-op hematoma of left foot 11/12/2020    Recurrent otitis media     Septic shock (HCC)     Syncope     Tear of medial meniscus of left knee     Tobacco use     Toe osteomyelitis, left ([address] 7/24/2020     Past Surgical History:   Procedure Laterality Date    ARM SURGERY Left 2/5/2022    LEFT HAND INCISION AND DRAINAGE performed by Jaycob Barakat MD at [facility] Left 2/1/2022    ULCER DEBRIDEMENT LEFT FOOT performed by Chas Mosley DPM at [address] [facility] ARTHROSCOPY Left [id number]    LEFT KNEE ARTHROSCOPY , SYNOVECTOMY       OTHER SURGICAL HISTORY Left 07/24/2020    PARTIAL LEFT FOOT AMPUTATION    TOE AMPUTATION Right 8/23/2019    PARTIAL RIGHT FOOT AMPUTATION performed by Chas Mosley DPM at [address] [facility] [address] Left 7/24/2020    PARTIAL LEFT FOOT AMPUTATION performed by Chas Mosley DPM at [address] [facility] [address] Left 10/22/2020    PARTIAL LEFT FOOT AMPUTATION WITH GRAFT APPLICATION performed by Chas Mosley DPM at [address] [facility]       Allergies   Allergen Reactions    Januvia [Sitagliptin] Nausea Only     Has taken metformin without side effects in the past.  Nausea with Janumet in the past.     Metformin And Related      GI Upset    Vancomycin      Pt had red face, swelling itching of eyelids, sore throat after receiving vancmomyin and cefepime. I think this was a histamine releasing reaction from vancomycin most likely. The cefepime was switched to Zosyn and patient had no reaction to Zosyn    Mustjoyce Luna Isothiocyanate] Swelling and Rash       DATE ONSET: 02/05/22    Date of Evaluation: 2/10/2022   Evaluating Therapist: Benito Burns (Supervising SLP)    Chart Reviewed: : [x] Yes [] No    Current Diet: Diet NPO    Recent Chest Radiography: [x] Chest XR   [] CT of Chest  Date: 02/05/22  Impressions:  Impression       1.  Endotracheal tube 5 cm above the ariadna an NG tube extends into the mid   to distal stomach.  The right internal jugular central venous line is   unchanged.       2.  Opacity and volume loss of the left hemithorax which has worsened   suggesting pneumonia a possibly some atelectasis.  Ovoid area of opacity in   the right middle lower lung field suggesting additional area of pneumonitis.       3.  Possible left pleural effusion.       4.  Cardiomegaly, unchanged. Pain: The patient does not complain of pain     Reason for Referral  Spring Olvera was referred for a bedside swallow evaluation to assess the efficiency of their swallow function, identify signs and symptoms of aspiration and make recommendations regarding safe dietary consistencies, effective compensatory strategies, and safe eating environment.     Assessment    Medical record review/interview: Per MD H&P \"Patient is a 44-year-old white male with a prior history of diabetes mellitus type 2 poorly controlled, CHF, history of diabetic ulcer with amputation of the right great, history of tobacco abuse, recent burn to the left hand-Velban been feeling well since Wednesday. Initially thought he had COVID. Home COVID test is negative. Patient is sleeping more and had decreased appetite. Wife finally called 911. Work-up in the ED showed acute kidney injury. His creatinine was normal in October 2021. Patient is admitted to the hospital and started on IV fluids. This morning his creatinine is worse. He is made about 300 cc of urine. His mentation is worse. He is not requiring oxygen. The time of admission he did not require oxygen. He is presently on 5 L and saturating 90%. His respiratory rate is also got worse. His mentation is about the same. He can answer some questions. He denies any chest pain.     The patient has been apparently taking ibuprofen for the last 2 days but does not take NSAIDs on a regular basis. He denies any chest pain. Patient is vaccinated against COVID-19 but has not had the booster.     Patient is allergic to [place] [sitagliptin], metformin and related, vancomycin, and mustard oil [allyl isothiocyanate]. \"    [facility] called 01/23/22. Patient not breathing with no pulse. CPR and ACLS protocol were followed after which patient gained pulse and blood pressure back. In route to ICU he lost his pulse again for which CPR was restarted. Patient gained his pulse back a second time and started on epinephrine drip. Intubated 1/23/22 - 2/5/22; extubated and re-intubated due to unable to clear secretions/hypoxia on 2/5/22 after less than 12 hours. Pt extubated on 2/9/22. Pt alert throughout visit. Pt did not verbally respond to questions or commands, though he attempted at times. Pt communicated via head shakes for yes/ no. Noted with significant response latency. Question possibility of anoxic brain injury. Pt to be completely aphonic.      Predisposing dysphagia risk factors: N/A  Clinical signs of possible chronic dysphagia: reduced PO intake  Precipitating dysphagia risk factors: reduced physical mobility, increased O2 demands, AMS, recent intubation, emergent/traumatic intubation, suspected disuse atrophy, prolonged intubation and recurrent intubation    Vitals/labs:     Vitals:    02/10/22 1100 02/10/22 1200 02/10/22 1300 02/10/22 1400   BP: (!) 207/70 (!) 211/69 (!) 176/75 (!) 196/69   Pulse: 85 89 78 83   Resp: (!) 31 30 (!) 33 29   Temp: 98.2 °F (36.8 °C)      TempSrc: Bladder      SpO2: 96% 97% 95% 94%   Weight:       Height:            CBC:   Recent Labs     02/10/22  0400   WBC 8.2   HGB 7.8*         BMP:  Recent Labs     02/10/22  0400      K 4.8      CO2 21   BUN 86*   CREATININE 7.9*   GLUCOSE 139*          Cranial nerve exam: CN exam limited due to impaired ability to follow commands  CN V (trigeminal): ophthalmic, maxillary, and mandibular facial sensation- DEE however, pt able to raise eyebrows and smile   CN VII (facial): DEE  CN IX/X (glossopharyngeal/vagus): MPT: DEE; pitch range: DEE; vocal quality: aphonic; cough: Weak- perceptually, Wet and Non-Productive  CN XII (hypoglossal): DEE      Laryngeal function exam: Laryngeal function exam limited due to impaired ability to follow commands  Secretions: oral mucosa pink and moist   Vocal quality: See CN exam above  MPT: See CN exam above  S/Z ratio: DEE  Pitch range: See CN exam above  Cough: See CN exam above    Oral Care Status:  Oral care [facility]. Pt not able to follow commands to open oral cavity leading to limited exam. Did note upper and lower dentition in good condition. PO trials:   Ice:  No anterior bolus loss, suspect reduced/impaired A-P bolus transit, suspect premature bolus loss into pharynx, suspect delayed swallow onset, no coughing and wet vocal quality  IDDSI 0 (thin):   - 5cc bolus (tsp): almost complete anterior bolus loss, suspect reduced/impaired A-P bolus transit of remaining trace residue. Suspect absent propulsion to pharynx.    - Cup:  almost complete anterior bolus loss, suspect reduced/impaired A-P bolus transit of remaining trace residue. Suspect absent propulsion to pharynx. - Straw: no anterior bolus loss , suspect reduced/impaired A-P bolus transit, suspect premature bolus loss into pharynx, suspect delayed swallow onset, no coughing, wet vocal quality and wet breath sounds upon inhalation and exhalation  IDDSI 4 (puree): no anterior bolus loss , suspect A-P bolus transit a little prolonged but grossly WFL, swallow timing subjectively appears timely, oral clearance grossly WFL, no coughing, wet vocal quality and wet breath sounds upon inhalation and exhalation    Impressions:  Clinical signs of oropharyngeal dysphagia likely subacute related to recurrent and emergent/traumatic intubation, cognitive presentation (inabilty to follow commands, response latency), disuse atophy. Swallow prognosis is guarded-fair. Instrumental swallow study is indicated. Given suspected disuse atrophy of swallow musculature, reduced physical mobility, and cognitive presentation, pt is not safe for oral diet at this time    Instrumentation: Yes, however, not appropriate at this time. Will address once pt is tolerating some PO at bedside and is able to follow commands.   Diet recommendation: NPO; Ice chips after oral care to prevent disuse atrophy; Meds via alt means of nutrition  Risk management: Control risk factors for aspiration PNA by completing oral care 3-4x/day and increasing physical mobility as is medically feasible      Prognosis: Guarded - Fair    Recommended Intervention:  [x] Dysphagia tx  [] Videostroboscopy                      [x] NPO   [x] MBS       [] Speech/Cog Eval    [] Therapeutic PO Trials     [x] Ice Chips   [] Other:  [x] FEES                                  Dysphagia Therapeutic Intervention:  []  Bolus control Exercises  []  Oral Motor Exercises  []  [employer] Protocol  []  Thermal Stimulation  [x]  Oral Care    []  Vital Stim/NMES  []  Laryngeal Exercises  [x]  Patient/Family Education  []  Pharyngeal Exercises  [x]  Therapeutic PO trials with SLP  []  Diet tolerance monitoring  []  Other:     Referrals:  [x] ENT    [] PT  [x] Pulmonology [] GI  [x] Neurology  [] RD  [] OT   []     Goals:  Short Term Goals:  Timeframe for Short Term Goals: (5 days 02/15/22)  Goal 1: The patient will tolerate repeat BSE as able  Goal 3: The patient/caregiver will demonstrate understanding of compensatory swallow strategies, for improved swallow safety  Goal 4: The patient will tolerate instrumental assessment when able     Long Term Goals:   Timeframe for Long Term Goals: (7 days 02/17/22)  Goal 1: The patient will tolerate least restrictive diet with no clinical s/s of aspiration or worsening respiratory/pulmonary status    Treatment:  Skilled instruction completed with patient re: evidenced based practice regarding recommendations and POC, importance of oral care to reduce adverse affects in the event of aspiration, and instruction of recommended compensatory strategies developed based upon clinical exam. Pt not able to recall/demonstrate compensatory strategies despite max cues. RN aware of rec's      Pt Education: SLP educated the patient re: Role of SLP, rationale for completion of assessment, anatomical components of swallow structures as they pertain to airway protection, results of assessment, recommendations and POC  Pt Education Response: needs reinforcement, would benefit from ongoing education and RN aware    Duration/Frequency of Tx: 3-5x/wk     Individuals Consulted:   [x]  Patient     []  NP         [x]  RN   []  RD                   []  MD      []  Family Member                        []  PA    []  Other:      Safety Devices / Report:  [x]  All fall risk precautions in place [x]  Safety handoff completed with RN  [x]  Bed alarm in place  [x]  Left in bed     []  Chair alarm in place  []  Left in chair   [x]  Call light in reach   []  Other:           Total Treatment Time / Charges       Time in Time out

## 2022-02-10 NOTE — PROGRESS NOTES
Pulmonary & Critical Care Medicine ICU Progress Note    CC: Septic shock, MSSA bacteremia, left foot abscess    Events of Last 24 hours: On 2L O2, hypertensive    Vascular lines:    RUE PICC 2/7/22  Right IJ 1/23 - 2/7/22  Right femoral Vas-Cath 1/23    MV: 1/23/22 - 2/5/22; extubated and re-intubated due to unable to clear secretions/hypoxia on 2/5/22 after less than 12 hours  Vent Mode: PS Rate Set: 0 bmp/Vt Ordered: 0 mL/ /FiO2 : 30 %  Recent Labs     02/08/22  0319 02/09/22  0510   PHART 7.384 7.474*   PCZ9LZE 30.7* 28.1*   PO2ART 86.7 72.4*       IV:   sodium chloride 5 mL/hr at 02/09/22 1630    sodium chloride      sodium chloride      sodium chloride      sodium chloride Stopped (02/07/22 0806)    dextrose         Vitals:  Blood pressure (!) 225/85, pulse 85, temperature 98.3 °F (36.8 °C), temperature source Bladder, resp. rate 24, height 6' 1\" (1.854 m), weight 280 lb 6.8 oz (127.2 kg), SpO2 97 %. on 2 L  Constitutional:  No acute distress. Eyes: PERRL. Conjunctivae anicteric. ENT: Normal nose. Normal tongue. Neck:  Trachea is midline. No thyroid tenderness. Respiratory: No accessory muscle usage. decreased breath sounds. No wheezes. No rales. + Rhonchi. Cardiovascular: Normal S1S2. No digit clubbing. No digit cyanosis. No LE edema. Gastrointestinal: No mass palpated. No tenderness palpated. No umbilical hernia. Lymphatic: No cervical or supraclavicular adenopathy. Skin: No rash on the exposed extremities. No Nodules or induration on exposed extremities. Psychiatric: No anxiety or Agitation.  Alert and Oriented to person      Scheduled Meds:   insulin glargine  25 Units SubCUTAneous BID    carvedilol  6.25 mg Oral BID WC    insulin lispro  0-18 Units SubCUTAneous Q4H    sodium chloride flush  5-40 mL IntraVENous 2 times per day    povidone-iodine   Topical Daily    pantoprazole  40 mg IntraVENous Daily    heparin (porcine)  5,000 Units SubCUTAneous 3 times per day    ampicillin-sulbactam  3,000 mg IntraVENous Q12H    Venelex   Topical BID    sodium chloride flush  5-40 mL IntraVENous 2 times per day       Data:  CBC:   Recent Labs     02/08/22 0319 02/09/22  0400 02/10/22  0400   WBC 9.6 8.8 8.2   HGB 7.3* 7.5* 7.8*   HCT 22.1* 22.1* 23.1*   MCV 90.3 89.9 91.3    404 388     BMP:   Recent Labs     02/08/22 0319 02/09/22  0400 02/10/22  0400    134* 141   K 4.6 4.5 4.8    96* 101   CO2 18* 21 21   PHOS 11.5* 7.7* 10.9*   BUN 85* 64* 86*   CREATININE 7.7* 6.0* 7.9*     LIVER PROFILE:   Recent Labs     02/08/22 0319 02/09/22  0400 02/10/22  0400   AST 7* 11* 8*   ALT <5* <5* <5*   BILIDIR 0.3 0.3 0.3   BILITOT 0.6 0.5 0.5   ALKPHOS 248* 254* 217*       Microbiology:  1/22 [facility] MSSA  1/22 COVID-19 and influenza negative  1/23/22 Blood cx: NGTD  1/23 tracheal aspirate MSSA  1/24 Wound cx: MSSA  1/24 BC MSSA  1/29/22 BAL pending  1/30/2022 blood sent  1/31/2022 left hand Enterococcus and staph aureus  2/1/2022 bone MSSA  2/5/2022 surgical hand culture E. Faecalis    Echo 1/25/22: EF 35%, global HK, reduced RV function    CXR 2/9/2022   Low volume study with no significant interval change in diffuse bilateral   opacity which can reflect pulmonary edema or pneumonia. 1/31/22 EEG:This EEG is abnormal. The generalized diffuse slowing is suggestive of severe diffuse encephalopathy. There is no evidence of epileptiform discharges, focal, or lateralizing abnormalities.       ASSESSMENT:  · Acute hypoxemic respiratory failure   · Increased tracheal secretions and failure of planned extubation 2/5/22, suspect new VAP   · Septic shock    · Cardiopulmonary arrest x 2 1/23/2022, required CPR, TTM - rewarmed 8 pm 1/25/22  · Acute kidney failure  · MSSA bacteremia  · L foot abscess drained 1/24/2022, MSSA; MRI with large fluid collection and changes c/w osteomyelitis, s/p debridement by Dr. Quyen Pinto on 2/1/22  · L hand burn with deep tissue injury & abscess, s/p debridement on 2/5/22  · Paroxysmal AFIB   · Cardiomyopathy EF 35% on Echo 1/24/22  · DM2  · Anemia with multiple transfusions with no obvious ongoing bleeding source     PLAN:  Supplemental oxygen to maintain SaO2 >92%; wean as tolerated   · ABX D#19, now IV unasyn per infectious disease  · Nephrology following for HD  · TF   · SLP/OT/PT  · Add Hydralazine to labatelol prn  · Prophylaxis: SQ heparin, Pepcid  · Transfer to

## 2022-02-10 NOTE — PROGRESS NOTES
Nephrology Progress Note   [email or web address]      This patient is a 42year old male whom we are following for RODRICK, HD dependent. Subjective:  Patient is more awake alert  Blood pressure is high and is responding with IV Lasix labetalol and hydralazine  HD on 2/10  HD on 2/8 with 2 L UF, post with 124.6 kg    Extubated 2/9 wa Reintubated over the weekend of 2/5    last 24-hour urine output of 600 mL, 400 ml this am    Family History:  family at bedside  ROS: Unable to obtain since intubated      Vitals:  BP (!) 176/75   Pulse 78   Temp 98.2 °F (36.8 °C) (Bladder)   Resp (!) 33   Ht 6' 1\" (1.854 m)   Wt 280 lb 6.8 oz (127.2 kg)   SpO2 95%   BMI 37.00 kg/m²   I/O last 3 completed shifts:   In: 1215.5 [I.V.:236.5; NG/GT:679; IV Piggyback:300]  Out: 1657 [Urine:757; Stool:900]  I/O this shift:  In: -   Out: 400 [Urine:400]    Physical Exam:  Gen: Awake  Cardiovascular:  S1, S2 without m/r/g 1+ lower extremity edema  Respiratory: Coarse   abdomen:  soft, nt, nd,   Neuro/Psy: Alert and awake  Access: R femoral vas cath      Medications:   insulin glargine  25 Units SubCUTAneous BID    carvedilol  6.25 mg Oral BID WC    insulin lispro  0-18 Units SubCUTAneous Q4H    sodium chloride flush  5-40 mL IntraVENous 2 times per day    povidone-iodine   Topical Daily    pantoprazole  40 mg IntraVENous Daily    heparin (porcine)  5,000 Units SubCUTAneous 3 times per day    ampicillin-sulbactam  3,000 mg IntraVENous Q12H    Venelex   Topical BID    sodium chloride flush  5-40 mL IntraVENous 2 times per day         Labs:  Recent Labs     02/08/22 0319 02/09/22  0400 02/10/22  0400   WBC 9.6 8.8 8.2   HGB 7.3* 7.5* 7.8*   HCT 22.1* 22.1* 23.1*   MCV 90.3 89.9 91.3    404 388     Recent Labs     02/08/22 0319 02/09/22  0400 02/10/22  0400    134* 141   K 4.6 4.5 4.8    96* 101   CO2 18* 21 21   GLUCOSE 149* 227* 139*   PHOS 11.5* 7.7* 10.9*   BUN 85* 64* 86*   CREATININE 7.7* 6.0* 7.9*   LABGLOM 8* 10* 7*   GFRAA 9* 12* 9*       Assessment/      RODRICK likely related to prerenal factors in the setting of sepsis, use of diuretics and losartan contributing to multifactorial ATN. UA is not suggestive of staph associated glomerulonephritis.  Patient did not respond to IV fluids and developed acute pulmonary edema and renal replacement therapy initiated on 1/23/22        on HD      -Acute pulmonary edema              Status post intubation, resolved              On vent      -Acute hypoxic respiratory failure     -Cardio respiratory arrest       -Hyperkalemia     -Sepsis with MSSA bacteremia with left foot abscess s/p drainage, osteomyelitis, left hand I&D     -Anemia - prn prbc's     -Diabetes, uncontrolled    -Hypertension     Plan/   -HD on 2/10 as ordered  -Follow signs of renal recovery  -We will consult IR for Vas-Cath in am after which the femoral line can be discontinued  -Serial renal panel  -daily wts and strict i/o  -renal dose medications   -avoid nephrotoxins    Please do not hesitate to contact me at [phone] if with questions. Thank you! Wilmar Hoyt MD  [facility]  2/10/2022

## 2022-02-10 NOTE — FLOWSHEET NOTE
02/10/22 1200   Vitals   Pulse 89   Heart Rate Source Monitor   Resp 30   BP (!) 211/69   MAP (mmHg) 103   BP Method Automatic   CPOT (Critical Patient)   O2 Device High flow nasal cannula   Oxygen Therapy   SpO2 97 %   O2 Flow Rate (L/min) 2 L/min   Pt remains hypertensive. PRN labetolol provided per Nephro recs. Will continue to monitor.

## 2022-02-10 NOTE — PLAN OF CARE
Problem: Nutrition  Intervention: Swallowing evaluation  Note: SLP completed evaluation. Please refer to notes in EMR. Intervention: Aspiration precautions  Note: SLP completed evaluation. Please refer to notes in EMR.     Miki Sutton   SLP  Clinician

## 2022-02-10 NOTE — OP NOTE
[address]                 [address] [address]                                OPERATIVE REPORT    PATIENT NAME: Vidhi Ardon                   :        1975  MED REC NO:   [id number]                          ROOM:       3007  ACCOUNT NO:   [de-identified]                           ADMIT DATE: 2022  PROVIDER:     Chas Mosley DPM    DATE OF PROCEDURE:  2022    PREOPERATIVE DIAGNOSES:  1. Diabetic foot ulceration, left foot. 2.  Osteomyelitis, left foot. 3.  Cellulitis, left foot. 4.  Diabetes mellitus, uncontrolled. POSTOPERATIVE DIAGNOSES:  1. Diabetic foot ulceration, left foot. 2.  Osteomyelitis, left foot. 3.  Cellulitis, left foot. 4.  Diabetes mellitus, uncontrolled. OPERATION PERFORMED:  Ulcer debridement, left foot. SURGEON:  Chas Mosley DPM    ANESTHESIA:  Local MAC. HEMOSTASIS:  Ankle tourniquet 250 mmHg. ESTIMATED BLOOD LOSS:  Less than 100 mL. REPORT OF OPERATION:  The patient was brought into the operating room  and placed on the operating table in supine position. Under mild IV  sedation, the left dorsal foot was anesthetized with 20 mL of a 1:1  mixture of 1% lidocaine plain and 0.5% Marcaine plain. The foot was  then scrubbed, prepped, and draped in the usual sterile manner. Esmarch  was then utilized to exsanguinate the left foot and ankle tourniquet was  then inflated to 250 mmHg. Attention was then directed to the dorsal aspect of the left foot, where  the ulceration was identified. At this time, scalpel was then utilized  to ellipse the necrotic and infected tissue as suggested by the  erythematous and lysing skin on the dorsal midfoot to forefoot region. This did increase the ulceration size from its preoperative state. This  also excised a portion of the eschar in the region.   Upon excision of  the skin, it was noted that there was a fairly extensive amount of  liquefaction and liquefacted soft tissues on the dorsal and dorsomedial  aspect of the foot. This was portions of subcutaneous tissue, adipose  tissue as well as tendon. The scalpel was continued to be utilized to  excise the infected and necrotic tissue in this region. Portions of the  extensor tendons were completely excised given their necrotic and  infected state. There were portions of bone in the more superior aspect  of the foot which were excised with a combination of the scalpel as well  as rongeur debridement. Portion of this bone was sent for culture and  sensitivity. Evaluations of the bone of the midfoot are very consistent  with Charcot arthropathy. There was also a small amount of purulence  noted surrounding the bones of the midfoot, therefore, suggesting that  the discoloration and softness of some of the bone is also consistent  with osteomyelitis or bone infection. Therefore, he has findings  consistent with cellulitis or skin infection as well as deep infection  consistent with bone infection/osteomyelitis. All necrotic and infected  tissue was sharply excised from the foot itself. Surprisingly, the soft  tissue changes did not extend into the plantar foot. The ulceration was  debrided of all fibrotic, necrotic, viable and nonviable tissue excising  skin, subcutaneous tissue, tendon and bone with scalpel and rongeur. This  excised 21cm2. At this time,surgical site was then copiously irrigated   with sterile saline via the pulse lavage. All bleeders were cauterized as necessary. Post  debridement, the ulceration measured approximately 7.5 x 3 cm. At this  time, surgical site was then packed with iodoform gauze, fluffs, Kerlix,  ABD, and Coban. Tourniquet was then deflated. The patient tolerated the procedure and anesthesia well and transferred  to the recovery room with vital signs stable and vascular status intact.   There was evidence for a prompt hyperemic response to all remaining  digits of the left foot. Following a brief period of postoperative  monitoring, the patient will be transferred back to the floor under the  care of the Medical Service.       YAMILET FENG DPM    D: 02/10/2022 10:18:28       T: 02/10/2022 16:54:30     [email or web address]  Job#: [id number]     Doc#: [id number]    CC:

## 2022-02-10 NOTE — PROGRESS NOTES
[facility] Infectious Disease Progress Note      Harriett Chand     : 1975    DATE OF VISIT:  2/10/2022  DATE OF ADMISSION:  2022       Subjective:     Harriett Chand is a 42 y.o. male whom I've been seeing for a deep MSSA left foot infection, deep MSSA and Enterococcal left hand infection, with multiorgan failure. Since I last saw him, he was extubated yesterday, on nasal oxygen, is fairly alert, seems to understand me, can answer simple yes/no questions. Denies pain, cough, chills. Still with some diarrhea, still with improved urine output from last week. Reviewed photos from dressing changes yesterday - I plan a bit of bedside debridement of his left foot on Monday, and orthopedics is now giving orders for dressing changes for the hand (no packing to tunnels; CHG soaks BID). Less tachycardic, more hypertensive today. Mr. Kole Fleming has a past medical history of Abscess of left foot, Acute osteomyelitis of right hallux ([address] Cellulitis of left foot, CHF (congestive heart failure) ([address] Chronic osteomyelitis of left foot ([address] Closed displaced fracture of distal phalanx of right little finger, Clostridium difficile infection, Diabetic ulcer of left forefoot associated with type 2 diabetes mellitus, with necrosis of bone ([address] Diabetic ulcer of right great toe associated with type 2 diabetes mellitus, with necrosis of bone ([address] Failed soft 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 toe amputation site, History of hyperbaric oxygen therapy, Migraine, Possible perforated tympanic membrane, Post-op hematoma of left foot, Recurrent otitis media, Septic shock ([address] Syncope, Tear of medial meniscus of left knee, Tobacco use, and Toe osteomyelitis, left ([address]     Current Facility-Administered Medications: hydrALAZINE (APRESOLINE) injection 20 mg, 20 mg, IntraVENous, Q6H PRN  labetalol (NORMODYNE;TRANDATE) injection 20 mg, 20 mg, IntraVENous, Q4H PRN  insulin glargine (LANTUS) injection vial 25 Units, 25 Units, SubCUTAneous, BID  carvedilol (COREG) tablet 6.25 mg, 6.25 mg, Oral, BID WC  insulin lispro (HUMALOG) injection vial 0-18 Units, 0-18 Units, SubCUTAneous, Q4H  sodium chloride flush 0.9 % injection 5-40 mL, 5-40 mL, IntraVENous, 2 times per day  sodium chloride flush 0.9 % injection 5-40 mL, 5-40 mL, IntraVENous, PRN  0.9 % sodium chloride infusion, 25 mL, IntraVENous, PRN  povidone-iodine (BETADINE) 10 % external solution, , Topical, Daily  0.9 % sodium chloride infusion, , IntraVENous, PRN  pantoprazole (PROTONIX) injection 40 mg, 40 mg, IntraVENous, Daily  heparin (porcine) injection 5,000 Units, 5,000 Units, SubCUTAneous, 3 times per day  0.9 % sodium chloride infusion, , IntraVENous, PRN  heparin (porcine) injection 3,000 Units, 3,000 Units, IntraVENous, PRN  ampicillin-sulbactam (UNASYN) 3000 mg ivpb minibag, 3,000 mg, IntraVENous, Q12H  sodium chloride 0.9 % irrigation 1,000 mL, 1,000 mL, Irrigation, Continuous PRN  Venelex ointment, , Topical, BID  albumin human 25 % IV solution 12.5 g, 12.5 g, IntraVENous, PRN  heparin (porcine) injection 2,600 Units, 2,600 Units, IntraCATHeter, PRN  sodium chloride flush 0.9 % injection 5-40 mL, 5-40 mL, IntraVENous, 2 times per day  sodium chloride flush 0.9 % injection 5-40 mL, 5-40 mL, IntraVENous, PRN  0.9 % sodium chloride infusion, 25 mL, IntraVENous, PRN  acetaminophen (TYLENOL) tablet 650 mg, 650 mg, Oral, Q6H PRN **OR** acetaminophen (TYLENOL) suppository 650 mg, 650 mg, Rectal, Q6H PRN  glucose (GLUTOSE) 40 % oral gel 15 g, 15 g, Oral, PRN  glucagon (rDNA) injection 1 mg, 1 mg, IntraMUSCular, PRN  dextrose 5 % solution, 100 mL/hr, IntraVENous, PRN  dextrose bolus (hypoglycemia) 10% 125 mL, 125 mL, IntraVENous, PRN **OR** dextrose bolus (hypoglycemia) 10% 250 mL, 250 mL, IntraVENous, PRN     This is day 8 of Unasyn, so day 8 of Enterococcal therapy, day 19 of MSSA therapy, POD 9 for the foot, POD 5 for the hand.      Allergies: Januvia [sitagliptin], Metformin and related, Vancomycin, and Mustard oil [allyl isothiocyanate]    Pertinent items from the review of systems are discussed in the HPI; the remainder of the ROS was reviewed and is negative.      Objective:     Vital signs over the last 24 hours:  Temp  Av.1 °F (36.7 °C)  Min: 97.6 °F (36.4 °C)  Max: 98.9 °F (37.2 °C)  Pulse  Av.7  Min: 71  Max: 743  Systolic (82XNN), FAT:238 , Min:177 , MMI:577   Diastolic (16BSV), HFJ:97, Min:70, Max:88  Resp  Av.4  Min: 16  Max: 36  SpO2  Av.1 %  Min: 95 %  Max: 99 %    Constitutional:  well-developed, well-nourished, extubated, very fatigued and weak, nontoxic  Psych: awake, fairly alert, does not seem fully oriented yet  Eyes:  pupils equal, round, reactive to light; sclerae anicteric, conjunctivae not pale  ENT:  atraumatic; no labial ulcers, no thrush  Resp:  a bit more coarse with some rhonchi today, decreased at the bases  Cardiovascular:  heart regular, no murmur; generally mild extremity edema; no IV phlebitis; right PICC in place, and a right femoral CRRT line, exit sites benign  GI:  abdomen soft, NT, distended, hypoactive bowel sounds, no palpable masses or organomegaly; rectal tube now in place, with diarrhea  : Munoz in place, small amount of more yellow and clearer urine now   Musculoskeletal:  no clubbing, cyanosis or petechiae; extremities with no gross effusions or acute arthritis  Skin: warm, dry, normal turgor; no acute rash, no peripheral stigmata of endocarditis. I didn't examine his wounds this AM.   ______________________________    Recent Labs     02/10/22  0400 22  0400 22  0319   WBC 8.2 8.8 9.6   HGB 7.8* 7.5* 7.3*   HCT 23.1* 22.1* 22.1*   MCV 91.3 89.9 90.3    404 413     Lab Results   Component Value Date    CREATININE 7.9 (HH) 02/10/2022     Lab Results   Component Value Date    LABALBU 2.5 (L) 02/10/2022     Lab Results   Component Value Date    ALT <5 (L) 02/10/2022    AST 8 (L) 02/10/2022    GGT 272 (H) 01/31/2022    ALKPHOS 217 (H) 02/10/2022    BILITOT 0.5 02/10/2022      Lab Results   Component Value Date    LABA1C 10.6 01/23/2022     Other recent pertinent labs: Anion gap 19. Glucoses 100s - 200s. WBC diff normal.   ______________________________    Recent pertinent micro results:  Nothing new in the last 24 hours. ______________________________    Recent imaging results (last 7 days): MRI HAND LEFT WO CONTRAST    Result Date: 2/4/2022  1. Osteomyelitis of the 3rd metacarpal head tapering into the mid shaft. Osteomyelitis of the 3rd proximal phalangeal base and shaft. Moderate 3rd metacarpophalangeal joint effusion compatible with septic arthritis. 2. Mild marrow edema in the 5th metacarpal head and 5th proximal phalangeal base with normal T1 signal compatible with noninfectious reactive osteitis versus less likely early osteomyelitis. 3. Extensive subcutaneous edema compatible with cellulitis. 3 x 2.6 x 0.6 cm abscess versus phlegmon in the soft tissues dorsal to the 4th and 5th metacarpals. 4. Extensive edema within the interosseous musculature compatible with myositis. 5. Mild ulnar palmar bursitis distal to the carpal tunnel. XR CHEST PORTABLE    Result Date: 2/9/2022  Low volume study with no significant interval change in diffuse bilateral opacity which can reflect pulmonary edema or pneumonia. XR CHEST PORTABLE    Result Date: 2/8/2022  Interval decrease in left-sided pleural effusion. XR CHEST PORTABLE    Result Date: 2/7/2022  1. Unchanged position of support devices. 2. No significant change in bilateral airspace disease. XR CHEST PORTABLE    Result Date: 2/7/2022  Improvement of the previous seen left upper lobe airspace disease. Lines and tubes stable. XR CHEST PORTABLE    Result Date: 2/6/2022  Stable examination with layering left pleural effusion and right perihilar airspace disease.   Pulmonary edema and pneumonia are in the differential.     XR CHEST PORTABLE    Result Date: 2/5/2022  1. Endotracheal tube 5 cm above the ariadna an NG tube extends into the mid to distal stomach. The right internal jugular central venous line is unchanged. 2.  Opacity and volume loss of the left hemithorax which has worsened suggesting pneumonia a possibly some atelectasis. Ovoid area of opacity in the right middle lower lung field suggesting additional area of pneumonitis. 3.  Possible left pleural effusion. 4.  Cardiomegaly, unchanged. XR CHEST PORTABLE    Result Date: 2/4/2022  Multifocal opacities in the left lung likely with some left basilar pleural effusion/atelectasis is again noted. The less prominent opacities in the right lung are also stable. ET, NG, and right jugular line appear acceptable. IR PICC WO SQ PORT/PUMP > 5 YEARS    Result Date: 2/7/2022  Successful placement of PICC line.       Assessment:     Patient Active Problem List   Diagnosis Code    Hypertension I10    Uncontrolled type 2 diabetes mellitus with diabetic polyneuropathy ([facility]) E11.42, E11.65    Cardiomyopathy ([address] I42.9    Mixed hyperlipidemia E78.2    Allergic rhinitis J30.9    Osteoarthritis M19.90    Acute osteomyelitis of left foot ([address] M86.172    Acute renal failure ([facility]) N17.9    MSSA bacteremia R78.81, B95.61    Third degree burn of left hand T23.302A    Acute hypoxemic respiratory failure ([facility]) J96.01    Cardiopulmonary arrest ([facility]) I46.9    Hypertriglyceridemia E78.1    Staphylococcal arthritis of left foot ([facility]) M00.072    Antibiotic-associated diarrhea K52.1, T36.95XA    Acute encephalopathy G93.40    Infective tenosynovitis of extensor tendons of left hand M65.142    Enterococcal infection A49.1    Abscess of left hand L02.512    Acute osteomyelitis of left hand ([facility]) M86.142    Pressure injury of deep tissue of sacral region L89.156    Mild protein-calorie malnutrition ([facility]) E44.1     Assessment of today's active condition(s):      --          Background of uncontrolled DM2, neuropathy, no known PAD, multiple prior diabetic foot ulcers, infections, surgeries. Most recent wounds were at the left 1st and 2nd ray, but they had been healed for just about a year.      --          Admission this time with a fairly nonfocal sepsis syndrome, at least in terms of symptoms, complicated by shock, acute renal failure, lactic acidosis, then cardiac arrest, resuscitation, acute respiratory failure, rapid Afib. Currently with vent support, sedation, insulin drip, and on intermittent HD. Found to have MSSA bacteremia, and a sizeable MSSA foot abscess, septic midfoot arthritis and acute osteo of at least a couple of midfoot bones / metatarsal bases. A week post-op, the wound was looking somewhat better, more granulation, tendons viable, in need of some additional debridement at some point - I can work on that on Monday.      --          Improved hypoxemic respiratory failure, I think at least in part due to CHF / fluid overload after cardiac arrest. Now extubated to nasal cannula oxygen, still tachypneic, but not in overt distress. -- Ongoing ARF, on intermittent HD, but U/O improved in the last few days.     --          Third degree burn of left hand, with purulence beneath the lysing eschar seen to be running along extensor tendons - MRI and clinical exams c/w soft tissue abscess, septic tenosynovitis, possible acute septic arthritis at the 3rd MCPJ, with adjacent acute metacarpal and phalangeal osteo.  Definitely need to treat the Enterococcus, with it isolated from OR Cxs. Still some purulence along tendon tracks on Tuesday.      --          SIRS / sepsis finally resolved, after aggressive OR treatment of left foot and left hand infections.      --          Mental status had been improving somewhat, though a little concern that he's not following commands with (or generally moving) his extremities; off sedation now.      -- Colonization with Candida, not surprising given DM, prior steroids, extensive Abx Rx.      --          Unstageable pressure ulcer of sacrum -- conservative Rx for now.      Treatment recs:     Continue Unasyn.     Await final OR and BCx.      No need to Rx the Candida unless he has thrush.      Ongoing foot wound care per Dr. Crystal Fonseca and Nader Lee, but I can do a bit of bedside debridement on Monday.      Local care for left hand per orthopedics at this point.      Would prefer to get the femoral line out as soon as we can, replacing it with an IJ HD catheter, whether tunneled or not, depending on how long nephrology thinks his dialysis dependence might continue.     Electronically signed by Gala Rodriguez MD on 2/10/2022 at 11:06 AM.

## 2022-02-10 NOTE — FLOWSHEET NOTE
02/10/22 1400   Vitals   Pulse 83   Heart Rate Source Monitor   Resp 29   BP (!) 196/69   MAP (mmHg) 101   BP Method Automatic   CPOT (Critical Patient)   O2 Device High flow nasal cannula   Oxygen Therapy   SpO2 94 %   O2 Flow Rate (L/min) 2 L/min     Pt remains hypertensive. PRN hydralazine provided. Will continue to monitor.

## 2022-02-10 NOTE — PROGRESS NOTES
Subjective  Patient lying supine in bed with no family present. Pt agreeable to this PT eval & tx. Upper Extremity ROM/Strength  Please see OT evaluation. Lower Extremity ROM / Strength   AROM WFL: No AROM through BLE at this time. PROM: Bilat ankles to neutral DF. Left knee/hip flex/ext WFL. Right knee/hip flex/ext with stiffness and dec ROM ~50%; pt grimaces at end range. Strength Assessment (measured on a 0-5 scale): 0/5 throughout. Lower Extremity Sensation    NT    Lower Extremity Proprioception:   NT    Coordination and Tone  NT    Balance  Sitting:  Not tested; N/A  Comments: unsafe to attempt    Standing: Not tested; N/A  Comments: unsafe to attempt    Bed Mobility   Supine to Sit:    Not Tested  Sit to Supine:   Not Tested  Rolling:   Not Tested  Scooting in sitting: Not Tested  Scooting in supine:  Not Tested   Pt does not show any attempted AROM with repositioning of pillows under BUE/BLE. Transfer Training     Sit to stand:   Not Tested  Stand to sit:   Not Tested  Bed to Chair:   Not Tested with use of N/A    Gait gait deferred due to impaired mentation; pt ambulated 0 ft. Stair Training deferred, pt unsafe/ not appropriate to complete stairs at this time     Activity Tolerance   Pt completed therapy session with No adverse symptoms noted w/activity    Positioning Needs   Pt in bed, alarm set, positioned in proper neutral alignment and pressure relief provided. Call light provided and all needs within reach    Exercises Initiated  Garrison deferred secondary to lack of command following, 0/5 MMT  NA    Other  None. Patient/Family Education   Pt educated on role of inpatient PT, POC, importance of continued acti vity, calling for assist with mobility. Assessment  Pt seen for Physical Therapy evaluation in acute care setting. PTA pt was indep with all mobility and ADLs. Today functioning well below baseline and unable to participate in any mobility/ADLs.  Cognition is impaired. Much of this session was spent attempting to identify a reliable means of communication with pt as he is currently non-verbal. Delayed processing. Limited command following with simple instructions such as gaze following. All ROM to LEs is passive and almost all UE ROM is passive with occasional trace contraction noted. Expect pt will need significant skilled rehab as mentation improves, both in acute and post-acute settings. Recommending further skilled PT at either SNF or [facility]ACH (will CTA) upon discharge as patient functioning well below baseline, demonstrates good rehab potential and unable to return home due to burden of care beyond caregiver ability and home environment not conducive to patient recovery. Goals : To be met in 3 visits:  1). Roll side to side Max A x 2  2). Supine to/from sit: Max A x 2  3). Sit EOB 5 minutes with Max A    To be met in 6 visits:  1). Supine to/from sit: Max A   2). Sit to/from stand: Max A   3). Bed to chair: Set goal when appropriate. 4). Gait: Set goal when appropriate  5). Tolerate B LE exercises 3 sets of 10-15 reps    Rehabilitation Potential: Good  Strengths for achieving goals include:   PLOF and Family Support   Barriers to achieving goals include:    Complexity of condition    Plan    To be seen 2-3 x / week  while in acute care setting for therapeutic exercises, bed mobility, transfers, progressive gait training, balance training, and family/patient education. Signature: Tomi Blake, PT, DPT    If patient discharges from this facility prior to next visit, this note will serve as the Discharge Summary.
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
## 2022-02-10 NOTE — PROGRESS NOTES
Dressing to R hand changed at this time per wound care orders. Pt tolerated well. Will continue to monitor.

## 2022-02-11 NOTE — PROGRESS NOTES
Image guided temporary dialysis catheter completed. Dr. Jhonny Harkins placed a 12.5 [place] 20 cm [employer] acute triple lumen dialysis catheter in the right. Line aspirates and flushes with ease. Dialysis catheter secured with sutures. Surgical site dressing clean, dry, and intact. Each dialysis access lumen heparin locked with 1.4 ml of IV heparin each. IV access pigtail NS locked. Pt tolerated procedure without any signs or symptoms of distress. Vital signs stable. Report called to [facility] on PCU. Pt transported back to PCU in stable condition via bed by transport. Line ok to use per Jhonny Harkins.     Vital Signs  Vitals:    02/11/22 0900   BP: (!) 147/82   Pulse: 105   Resp: 24   Temp: 99.4 °F (37.4 °C)   SpO2: 95%
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
## 2022-02-11 NOTE — FLOWSHEET NOTE
02/11/22 1100   Vital Signs   Temp 99 °F (37.2 °C)   Temp Source Axillary   Pulse 109   Heart Rate Source Monitor   Resp 24   BP (!) 138/0   BP Location Left upper arm   Patient Position Semi fowlers   Level of Consciousness Alert (0)   MEWS Score 3   Pain Assessment   Pain Assessment 0-10   Pain Level 0   Oxygen Therapy   SpO2 94 %  (ra)    taken at 1107

## 2022-02-11 NOTE — FLOWSHEET NOTE
02/11/22 0956   Negative Pressure Wound Therapy Foot Left;Dorsal   Placement Date/Time: 02/02/22 1100   Pre-existing: No  Inserted by: Andreia HUNTLEY  Location: Foot  Wound Location Orientation: Left;Dorsal   $ Standard NPWT >50 sq cm PER TX $ Yes   Wound Type Surgical   Unit Type Vac Ulta with Veraflo   Dressing Type Black foam   Number of pieces used 1   Cycle Continuous   Target Pressure (mmHg) 125   Intensity 1   Irrigation Solution Sodium chloride 0.9%   Instillation Volume  14 mL   Soak Time  3 minutes   Vac Frequency 2 hours   Dressing Status Changed   Dressing Changed Changed/New   Drainage Amount Small   Drainage Description Serous   Dressing Change Due 02/14/22   Left dorsal foot:  Increased red, granulation tissue to lateral side. Exposed tendons remains moist and viable. Bone exposed at 9 o'clock. 40% yellow scattered slough in wound and along edges. Surrounding skin prepped using barrier wipe and vac drape. One black foam used in wound bed. Good seal obtained. Next VAC change on Monday. Dr Trevino Chance to possibly do bedside debridement on Monday AM.  Veraflo settings remain the same.      Dolphin mattress ordered for unstageable PI to sacrum

## 2022-02-11 NOTE — PROGRESS NOTES
Consult has been called to Dr. Jorge Leal on 2/11/22. Spoke with Hayden Clink.  12:42 PM    Balbina Mendez  2/11/2022
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
## 2022-02-11 NOTE — PROGRESS NOTES
PATIENT IS CURRENTLY RECEIVING DIALYSIS, DIALYSIS NURSE \"JASMIN\" AT BEDSIDE.  WAS INFORMED BY JASMIN, CURRENTLY HAVING DIFFICULTIES WITH THE DIALYSIS CATHETER FLOW. STATED HE WILL ATTEMPT TO PROCEED WITH DIALYSIS, POSSIBLE MAY NEED CATH THA OR A NEW DIALYSIS CATHETER. PATIENT IS CURRENTLY ON ROOM AIR, O2 SATS >94%, NO SIGNS OF DISTRESS, SECRETIONS SUCTIONED FROM THE BACK OF THE ORAL AIRWAY. VITAL SIGNS CURRENTLY WITH IN NORMAL LIMITS, B/P APPEARS TO BE WITH IN NORMAL LIMITS. AFEBRILE 97.0 BLADDER/CORE TEMP  HOB ELEVATED. WOUND VAC PATENT, COLLECTING SEROUS DRAINAGE. SIDE RAILS UP BED IN LOW POSITION.

## 2022-02-11 NOTE — PLAN OF CARE
Problem: Falls - Risk of:  Goal: Absence of physical injury  Description: Absence of physical injury  Outcome: Ongoing     Problem: Discharge Planning:  Goal: Participates in care planning  Description: Participates in care planning  Outcome: Ongoing     Problem: Injury - Risk of, Physical Injury:  Goal: Absence of physical injury  Description: Absence of physical injury  Outcome: Ongoing     Problem: Sensory Perception - Impaired:  Goal: Able to distinguish between reality-based and nonreality-based thinking  Description: Able to distinguish between reality-based and nonreality-based thinking  Outcome: Ongoing     Problem: Sleep Pattern Disturbance:  Goal: Appears well-rested  Description: Appears well-rested  Outcome: Ongoing

## 2022-02-11 NOTE — CARE COORDINATION
INTERDISCIPLINARY PLAN OF CARE CONFERENCE    Date/Time: 2/11/2022 10:37 AM  Completed by: Nisha Shoemaker RN, Case Management      Patient Name:  Kaur Collins  YOB: 1975  Admitting Diagnosis: Hyperglycemia [R73.9]  RODRICK (acute kidney injury) ([facility] [address] [N17.9]  Acute renal failure, unspecified acute renal failure type ([facility] [address] [N17.9]  Syncope, unspecified syncope type [R55]     Admit Date/Time:  1/22/2022  1:32 PM    Chart reviewed. Interdisciplinary team contacted or reviewed plan related to patient progress and discharge plans. Disciplines included Case Management, Nursing, and Dietitian. Current Status:ongoing  PT/OT recommendation for discharge plan of care: SNF    Expected D/C Disposition:  Skilled nursing facility  Confirmed plan with patient and/or family Yes confirmed with: (name) pt's wife, [facility] (as pt is not able to answer at this time)  Met with:pt's wife, [facility] (as pt is not able to answer at this time)  Discharge Plan Comments: Reviewed chart. Pt came out of ICU last night to PCU. Role of discharge planner explained and patient's wife, [facility] (as pt is not able to answer at this time)verbalized understanding. Pt is from home with wife. Pt worked full time and was independent prior to admission. Pt is having HD tunnel cath placed today. CM called Aurelia Quintanilla with [facility] for HD place and left a VM for Aurelia--HD list given to wife and she prefers [facility] location, but will go to [facility] or wherever is needed. [facility] had planned to transport pt, but at this time, per PT, will not be able to transport pt. [facility] was given a SNF list and will let CM know which place she would like pt to go to. Pt will need a precert. Awaiting PT/OT notes, as wife states she would like (1) [facility], (2) [facility], (3) [facility]. Addendum 1720pm: Referral to [facility] per wifes request. Referral sent through the system and also left on VM for Devorah.      Home O2 in place on admit: No  Pt informed of need

## 2022-02-11 NOTE — FLOWSHEET NOTE
Pt appears to be resting comfortably. VSS. No new needs observed at this time.       02/11/22 0423   Vital Signs   Temp 99.5 °F (37.5 °C)   Temp Source Axillary   Pulse 94   Heart Rate Source Monitor   Resp 24   BP (!) 140/82   BP Location Left upper arm   Patient Position Semi fowlers   Level of Consciousness Alert (0)   MEWS Score 2   Patient Currently in Pain No   Pain Assessment   Pain Assessment Faces   Pain Level 0   Oxygen Therapy   SpO2 95 %  (Room air)

## 2022-02-11 NOTE — PROGRESS NOTES
RADIOLOGY:  Patient status post ultrasound/fluoroscopically guided placement of right internal jugular temporary dialysis catheter. Patient tolerated the procedure well. Full report to follow.

## 2022-02-11 NOTE — PROGRESS NOTES
LEFT HAND WOUND DRESSING CHANGED, HAND WAS SOAKED AND JAYMIE FORM REPLACED PER DR. Christine Dhillon. SITE WAS PINK NO SIGNS OF ODOR. STERILE DRESSING PLACED. Pt/parent declined 48hr nurse callback upon discharge. Pt/parent verbalized understanding re: discharge instructions, medication, and f/u information. Urgent Care phone number provided to patient if questions arise.

## 2022-02-11 NOTE — PROGRESS NOTES
[facility] Infectious Disease Progress Note      Oscar Garcia     : 1975    DATE OF VISIT:  2022  DATE OF ADMISSION:  2022       Subjective:     Oscar Garcia is a 42 y.o. male whom I've been seeing for a deep MSSA left foot infection and a deep MSSA / Enterococcal left hand infection. Since I last saw him, he's now off oxygen, still a bit tachypneic, respirations a bit coarse and congested. Does seem more awake and alert than yesterday, not interacting a lot, but tries to answer a few simple questions, seems to recognize me, and did lightly squeeze my hand today, which is an improvement from yesterday. No fevers noted, still some diarrhea, slow improvement in urine output. Was seen by speech / language pathology yesterday. Mr. 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     Current Facility-Administered Medications: hydrALAZINE (APRESOLINE) injection 20 mg, 20 mg, IntraVENous, Q6H PRN  labetalol (NORMODYNE;TRANDATE) injection 20 mg, 20 mg, IntraVENous, Q4H PRN  insulin glargine (LANTUS) injection vial 25 Units, 25 Units, SubCUTAneous, BID  carvedilol (COREG) tablet 6.25 mg, 6.25 mg, Oral, BID WC  insulin lispro (HUMALOG) injection vial 0-18 Units, 0-18 Units, SubCUTAneous, Q4H  sodium chloride flush 0.9 % injection 5-40 mL, 5-40 mL, IntraVENous, 2 times per day  sodium chloride flush 0.9 % injection 5-40 mL, 5-40 mL, IntraVENous, PRN  0.9 % sodium chloride infusion, 25 mL, IntraVENous, PRN  povidone-iodine (BETADINE) 10 % external solution, , Topical, Daily  0.9 % sodium chloride infusion, , IntraVENous, PRN  pantoprazole (PROTONIX) injection 40 mg, 40 mg, IntraVENous, Daily  heparin (porcine) injection 5,000 Units, 5,000 Units, SubCUTAneous, 3 times per day  0.9 % sodium chloride infusion, , IntraVENous, PRN  heparin (porcine) injection 3,000 Units, 3,000 Units, IntraVENous, PRN  ampicillin-sulbactam (UNASYN) 3000 mg ivpb minibag, 3,000 mg, IntraVENous, Q12H  sodium chloride 0.9 % irrigation 1,000 mL, 1,000 mL, Irrigation, Continuous PRN  Venelex ointment, , Topical, BID  albumin human 25 % IV solution 12.5 g, 12.5 g, IntraVENous, PRN  heparin (porcine) injection 2,600 Units, 2,600 Units, IntraCATHeter, PRN  sodium chloride flush 0.9 % injection 5-40 mL, 5-40 mL, IntraVENous, 2 times per day  sodium chloride flush 0.9 % injection 5-40 mL, 5-40 mL, IntraVENous, PRN  0.9 % sodium chloride infusion, 25 mL, IntraVENous, PRN  acetaminophen (TYLENOL) tablet 650 mg, 650 mg, Oral, Q6H PRN **OR** acetaminophen (TYLENOL) suppository 650 mg, 650 mg, Rectal, Q6H PRN  glucose (GLUTOSE) 40 % oral gel 15 g, 15 g, Oral, PRN  glucagon (rDNA) injection 1 mg, 1 mg, IntraMUSCular, PRN  dextrose 5 % solution, 100 mL/hr, IntraVENous, PRN  dextrose bolus (hypoglycemia) 10% 125 mL, 125 mL, IntraVENous, PRN **OR** dextrose bolus (hypoglycemia) 10% 250 mL, 250 mL, IntraVENous, PRN     This is day 9 of Unasyn, day 9 of Enterococcal therapy, day 20 of MSSA therapy, POD 10 for the foot, POD 6 for the hand.      Allergies: Januvia [sitagliptin], Metformin and related, Vancomycin, and Mustard oil [allyl isothiocyanate]    Pertinent items from the review of systems are discussed in the HPI; the remainder of the ROS was reviewed and is negative.      Objective:     Vital signs over the last 24 hours:  Temp  Av.4 °F (36.9 °C)  Min: 97.9 °F (36.6 °C)  Max: 99.5 °F (37.5 °C)  Pulse  Av.2  Min: 78  Max: 798  Systolic (41GUN), HVM:747 , Min:140 , KL   Diastolic (46ORT), XOZ:34, Min:67, Max:85  Resp  Av.7  Min: 22  Max: 42  SpO2  Av.9 %  Min: 92 %  Max: 97 %    Constitutional:  well-developed, well-nourished, extubated, off O2, very fatigued and weak, nontoxic  Psych: awake, fairly alert, does not seem fully oriented yet  Eyes:  pupils equal, round, reactive to light; sclerae anicteric, conjunctivae not pale  ENT:  atraumatic; no labial ulcers, no thrush  Resp:  a bit more coarse with some rhonchi today, decreased at the bases  Cardiovascular:  heart regular, no murmur; generally mild extremity edema; no IV phlebitis; right PICC in place, and a right femoral CRRT line, exit sites benign  GI:  abdomen soft, NT, distended, bowel sounds present, no palpable masses or organomegaly; rectal tube in place, with diarrhea  : Munoz in place, small amount of more yellow and clearer urine now   Musculoskeletal:  no clubbing, cyanosis or petechiae; extremities with no gross effusions or acute arthritis  Skin: warm, dry, normal turgor; no acute rash, no peripheral stigmata of endocarditis. I didn't examine his wounds this AM.   ______________________________    Recent Labs     22  0425 02/10/22  0400 22  0400   WBC 8.8 8.2 8.8   HGB 8.2* 7.8* 7.5*   HCT 24.0* 23.1* 22.1*   MCV 90.1 91.3 89.9    388 404     Lab Results   Component Value Date    CREATININE 7.3 (HH) 2022     Lab Results   Component Value Date    LABALBU 2.7 (L) 2022     Lab Results   Component Value Date    ALT <5 (L) 02/10/2022    AST 8 (L) 02/10/2022     (H) 2022    ALKPHOS 217 (H) 02/10/2022    BILITOT 0.5 02/10/2022      Lab Results Component Value Date    LABA1C 10.6 01/23/2022     Other recent pertinent labs:  Glucoses mostly in the 100s now. Anion gap 20. WBC diff normal.   ______________________________    Recent pertinent micro results:  Nothing new this week. Left hand OR cultures were finalized as Enterococcus only (no anaerobes), though I think also deep infection with MSSA, given prior bedside culture. ______________________________    Recent imaging results (last 7 days): MRI HAND LEFT WO CONTRAST    Result Date: 2/4/2022  1. Osteomyelitis of the 3rd metacarpal head tapering into the mid shaft. Osteomyelitis of the 3rd proximal phalangeal base and shaft. Moderate 3rd metacarpophalangeal joint effusion compatible with septic arthritis. 2. Mild marrow edema in the 5th metacarpal head and 5th proximal phalangeal base with normal T1 signal compatible with noninfectious reactive osteitis versus less likely early osteomyelitis. 3. Extensive subcutaneous edema compatible with cellulitis. 3 x 2.6 x 0.6 cm abscess versus phlegmon in the soft tissues dorsal to the 4th and 5th metacarpals. 4. Extensive edema within the interosseous musculature compatible with myositis. 5. Mild ulnar palmar bursitis distal to the carpal tunnel. XR CHEST PORTABLE    Result Date: 2/9/2022  Low volume study with no significant interval change in diffuse bilateral opacity which can reflect pulmonary edema or pneumonia. XR CHEST PORTABLE    Result Date: 2/8/2022  Interval decrease in left-sided pleural effusion. XR CHEST PORTABLE    Result Date: 2/7/2022  1. Unchanged position of support devices. 2. No significant change in bilateral airspace disease. XR CHEST PORTABLE    Result Date: 2/7/2022  Improvement of the previous seen left upper lobe airspace disease. Lines and tubes stable. XR CHEST PORTABLE    Result Date: 2/6/2022  Stable examination with layering left pleural effusion and right perihilar airspace disease. Pulmonary edema and pneumonia are in the differential.     XR CHEST PORTABLE    Result Date: 2/5/2022  1. Endotracheal tube 5 cm above the ariadna an NG tube extends into the mid to distal stomach. The right internal jugular central venous line is unchanged. 2.  Opacity and volume loss of the left hemithorax which has worsened suggesting pneumonia a possibly some atelectasis. Ovoid area of opacity in the right middle lower lung field suggesting additional area of pneumonitis. 3.  Possible left pleural effusion. 4.  Cardiomegaly, unchanged. IR PICC WO SQ PORT/PUMP > 5 YEARS    Result Date: 2/7/2022  Successful placement of PICC line. Assessment:     Patient Active Problem List   Diagnosis Code    Hypertension I10    Uncontrolled type 2 diabetes mellitus with diabetic polyneuropathy ([facility]) E11.42, E11.65    Cardiomyopathy ([facility] I42.9    Mixed hyperlipidemia E78.2    Allergic rhinitis J30.9    Osteoarthritis M19.90    Acute osteomyelitis of left foot ([facility] M86.172    Acute renal failure ([facility]) N17.9    MSSA bacteremia R78.81, B95.61    Third degree burn of left hand T23.302A    Acute hypoxemic respiratory failure ([facility]) J96.01    Cardiopulmonary arrest ([facility]) I46.9    Hypertriglyceridemia E78.1    Staphylococcal arthritis of left foot ([facility]) M00.072    Antibiotic-associated diarrhea K52.1, T36.95XA    Acute encephalopathy G93.40    Infective tenosynovitis of extensor tendons of left hand M65.142    Enterococcal infection A49.1    Abscess of left hand L02.512    Acute osteomyelitis of left hand ([facility]) M86.142    Pressure injury of deep tissue of sacral region L89.156    Mild protein-calorie malnutrition ([facility]) E44.1     Assessment of today's active condition(s):      --          Background of uncontrolled DM2, neuropathy, no known PAD, multiple prior diabetic foot ulcers, infections, surgeries. Most recent wounds were at the left 1st and 2nd ray, but they had been healed for just about a year.    --          Admission this time with a fairly nonfocal sepsis syndrome, at least in terms of symptoms, complicated by shock, acute renal failure, lactic acidosis, then cardiac arrest, resuscitation, acute respiratory failure, rapid Afib. Currently with vent support, sedation, insulin drip, and on intermittent HD. Found to have MSSA bacteremia, and a sizeable MSSA foot abscess, septic midfoot arthritis and acute osteo of at least a couple of midfoot bones / metatarsal bases. A week post-op, the wound was looking somewhat better, more granulation, tendons viable, in need of some additional debridement at some point - I can work on that on Monday.      -- Resolved hypoxemic respiratory failure, I think at least in part due to CHF / fluid overload after cardiac arrest. Now extubated, off O2, still tachypneic, but not in overt distress.     --          Ongoing ARF, on intermittent HD, but U/O improved in the last few days.     --          Third degree burn of left hand, with purulence beneath the lysing eschar seen to be running along extensor tendons - MRI and clinical exams c/w soft tissue abscess, septic tenosynovitis, possible acute septic arthritis at the 3rd MCPJ, with adjacent acute metacarpal and phalangeal osteo. Definitely need to treat the Enterococcus, with it isolated from OR Cxs. Still some purulence along tendon tracks on Tuesday.      --          SIRS / sepsis finally resolved, after aggressive OR treatment of left foot and left hand infections.      --          Mental status had been improving somewhat, though a little concern that he's not following commands with (or generally moving) his extremities; off sedation now. Encephalopathy likely multifactorial (cardiac arrest, ICU stay, sedatives, sepsis, renal failure).  Also wondering if he might not have a significant degree of critical-illness myopathy and/or neuropathy.      --          Colonization with Candida, not surprising given DM, prior steroids, extensive Abx Rx.      --          Unstageable pressure ulcer of sacrum -- conservative Rx for now.     Treatment recs:     Continue Unasyn. Anticipating weeks more of IV therapy, for the osteomyelitis in both the hand and foot. Will be getting at least weekly labs, even when things are more stabilized (CBC-diff, CMP, ESR, cRP). Local wound care for the foot per Dr. Quang Elias, and for the hand per Orthopedics. Triad to the sacral pressure eschar for now -- I'll take a close look at that on Monday. Watch for Cdiff, Candidiasis, allergic reaction, IV site complications. Would prefer to have that femoral line out if at all possible; if ongoing HD needed, either a tunneled or non-tunneled IJ line? Needs extensive PT, OT; speech / swallow eval noted. I plan to do a bit of bedside debridement of the foot ulcer on Monday, when Larry Martines is there to change his VAC.  ___________________    I was not necessarily going to be in the hospital over the weekend to see Mr. Amos Lagos. Will keep an eye on Epic from home.  Please call or text if there are any urgent concerns over the weekend with his clinical course, test results, etc.    Electronically signed by Joe German MD on 2/11/2022 at 7:35 AM.

## 2022-02-11 NOTE — PROGRESS NOTES
Inpatient Occupational Therapy  Evaluation and Treatment    Unit: ICU  Date:  2/11/2022  Patient Name:    Hudson Null  Admitting diagnosis:  Hyperglycemia [R73.9]  RODRICK (acute kidney injury) [facility]) [N17.9]  Acute renal failure, unspecified acute renal failure type ([facility] [N17.9]  Syncope, unspecified syncope type [R55]  Admit Date:  1/22/2022  Precautions/Restrictions/WB Status/ Lines/ Wounds/ Oxygen: fall risk, IV, bed/chair alarm, rea catheter , telemetry, telesitter and continuous pulse ox, rectal tube, wound vac left foot. Dressing over burn on left hand. Difficulty communicating-     Treatment Time:  0815-0853  Treatment Number: 1     Billable Treatment Time: 28 minutes   Total Treatment Time:   38   minutes    Patient Goals for Therapy: unable to state a goal      Discharge Recommendations: SNF  DME needs for discharge: defer to facility       Therapy recommendations for staff:   UB/LB passive range of motion    History of Present Illness: Per H&P Dr. Parish Soni 1/23: \"Patient is a 19-year-old white male with a prior history of diabetes mellitus type 2 poorly controlled, CHF, history of diabetic ulcer with amputation of the right great, history of tobacco abuse, recent burn to the left hand-Velban been feeling well since Wednesday.  Initially thought he had [address] test is negative.  Patient is sleeping more and had decreased appetite.  Wife finally called 911.  Work-up in the ED showed acute kidney injury.  His creatinine was normal in October 2021. Celia Saldana is admitted to the hospital and started on IV fluids.  This morning his creatinine is worse.  He is made about 300 cc of urine.  His mentation is worse.  He is not requiring oxygen.  The time of admission he did not require oxygen.  He is presently on 5 L and saturating 90%.  His respiratory rate is also got worse.  His mentation is about the same. [facility] can answer some questions.  He denies any chest pain. \"    CODE BLUE called 01/23/22. Patient not breathing with no pulse. CPR and ACLS protocol were followed after which patient gained pulse and blood pressure back. In route to ICU he lost his pulse again for which CPR was restarted. Patient gained his pulse back a second time and started on epinephrine drip.      Intubated 1/23/22 - 2/5/22; extubated and re-intubated due to unable to clear secretions/hypoxia on 2/5/22 after less than 12 hours. Pt extubated on 2/9/22.     S/p L hand I&D 2/5    Home Health S4 Level Recommendation:  NA  AM-PAC Score:      Preadmission Environment    CODE BLUE called 01/23/22. Patient not breathing with no pulse. CPR and ACLS protocol were followed after which patient gained pulse and blood pressure back. In route to ICU he lost his pulse again for which CPR was restarted. Patient gained his pulse back a second time and started on epinephrine drip.      Intubated 1/23/22 - 2/5/22; extubated and re-intubated due to unable to clear secretions/hypoxia on 2/5/22 after less than 12 hours. Pt extubated on 2/9/22.     S/p L hand I&D 2/5    Cognition    A&O orientation not directly assessed. Pt nonverbal during evaluation. He shook his head yes 2 times to questions during assessment, but did not indicated no. He grimaced during PROM to his left UE. He followed command to turn his head to look at therapist. Attempted to communicate with YES NO visual by gazing with his eyes at the answer, but not clear in reliability to this type of communication with him. Able to follow 1 step commands inconsistently    Subjective  Patient lying supine in bed with no family present   Pt agreeable to this OT eval & tx. Upper Extremity ROM:    Impaired R PROM in all planes, edema throughout UE difficult to perform full ROM.    Impaired L PROM in all planes except left hand due to wound and bandage on top of hand,     Upper Extremity Strength:    BUE strength impaired but not formally assessed w/ MMT   Pt did not demonstrate AROM or muscle contraction, except in right elbow slight contraction for flexion. Upper Extremity Sensation    unknown    Upper Extremity Proprioception:  Impaired    Coordination and Tone  Impaired    Balance  Functional Sitting Balance:  NT  Functional Standing Balance:NT    Bed mobility:    Supine to sit:   Not Tested  Sit to supine:   Not Tested  Rolling:    Not Tested  Scooting in sitting:  Not Tested  Scooting to head of bed:   Not Tested    Bridging:   Not Tested    Transfers:    Sit to stand:  Not Tested  Stand to sit:  Not Tested  Bed to chair:   Not Tested  Standard toilet: Not Tested  Bed to [facility]:  Not Tested    Dressing:      UE:   Total A   LE:    Total A     Bathing:    UE:  Not Tested  LE:  Not Tested    Eating:   N/A, NPO    Toileting:  Not Tested     Grooming: Total Assist wash face    Activity Tolerance   Pt completed therapy session with Pain noted with grimace during PROM left UE  SpO2: 93% on RA  HR: 109  BP: 147/82    Positioning Needs: In bed, call light and needs in reach. Alarm Set    Exercise / Activities Initiated:   Hand flex/ext:  x5  Wrist flex/ext:  x5  Shoulder flex/ext:  x5   Elbow flex/ext x5    Patient/Family Education:   Role of OT    Assessment of Deficits: Pt seen for Occupational therapy evaluation in acute care setting. Pt demonstrated decreased Activity tolerance, ADLs, IADLs, Balance , Bed mobility, ROM, Safety Awareness, Strength, Transfers and Cognition. Pt functioning below baseline and will likely benefit from skilled occupational therapy services to maximize safety and independence. Goal(s) : To be met in 3 Visits:  1). Rolling in bed for toileting Max A of 2    To be met in 5 Visits:  1). Supine to/from Sit:  Max A of 2   2). Sitting at EOB for 5 minutes during grooming task  3). Grooming with  Max A  2). Pt to demonstrate AROM UE bilateral UEs x5 reps     Rehabilitation Potential:  Fair for goals listed above.     Strengths for achieving goals include: PLOF  Barriers to achieving goals include:  Complexity of condition, Weakness and Cognition     Plan: To be seen 2-3 x/wk  while in acute care setting for therapeutic exercises, bed mobility, transfers, dressing, bathing, family/patient education, ADL/IADL retraining, energy conservation training.      Paulina Nevarez, OTR/L [id number]      If patient discharges from this facility prior to next visit, this note will serve as the Discharge Summary

## 2022-02-11 NOTE — PROGRESS NOTES
Department of Orthopedic Surgery  Physician Assistant   Progress Note    Subjective:       Systemic or Specific Complaints: Tolerating extubation well. Did not answer questions today for me but awake. Doing hand soaks bid. Objective:     Patient Vitals for the past 24 hrs:   BP Temp Temp src Pulse Resp SpO2 Weight   02/11/22 1200 138/77 98.3 °F (36.8 °C) Axillary 108 22 93 % --   02/11/22 1127 132/79 99.3 °F (37.4 °C) Axillary 110 24 93 % --   02/11/22 1100 (!) 138/0 99 °F (37.2 °C) Axillary 109 24 94 % --   02/11/22 0900 (!) 147/82 99.4 °F (37.4 °C) Axillary 105 24 95 % --   02/11/22 0423 (!) 140/82 99.5 °F (37.5 °C) Axillary 94 24 95 % --   02/11/22 0021 -- -- -- -- -- -- 279 lb 15 oz (127 kg)   02/10/22 2224 (!) 153/68 98.4 °F (36.9 °C) Axillary 104 22 96 % --   02/10/22 2154 (!) 157/67 -- -- 102 25 96 % --   02/10/22 2100 (!) 185/68 98.3 °F (36.8 °C) -- 93 (!) 35 95 % 279 lb 15.8 oz (127 kg)   02/10/22 2000 (!) 164/68 97.9 °F (36.6 °C) Bladder 87 (!) 39 93 % --   02/10/22 1900 (!) 166/69 -- -- 84 (!) 32 -- --   02/10/22 1825 (!) 185/70 98.2 °F (36.8 °C) -- 83 (!) 41 -- 281 lb 12 oz (127.8 kg)   02/10/22 1800 (!) 184/67 -- -- 80 (!) 42 94 % --   02/10/22 1700 (!) 190/72 -- -- 94 (!) 39 95 % --   02/10/22 1600 (!) 186/74 -- -- 96 (!) 37 93 % --   02/10/22 1500 (!) 177/69 98.2 °F (36.8 °C) Bladder 93 (!) 35 92 % --   02/10/22 1400 (!) 196/69 -- -- 83 29 94 % --   02/10/22 1300 (!) 176/75 -- -- 78 (!) 33 95 % --       General: appears stated age and cooperative   Wound: Forearm is soft today. There is scant drainage noted on the dressing and overall the wound bed looks improved. Was not able to milk any pus from the forearm or hand today.      Motion:    DVT Exam: No evidence of DVT seen on physical exam.     Additional exam:         Data Review  CBC:   Lab Results   Component Value Date    WBC 8.8 02/11/2022    RBC 2.67 02/11/2022    HGB 8.2 02/11/2022    HCT 24.0 02/11/2022     02/11/2022 Renal:   Lab Results   Component Value Date     02/11/2022    K 5.4 02/11/2022    K 3.7 01/23/2022    CL 99 02/11/2022    CO2 18 02/11/2022    BUN 76 02/11/2022    CREATININE 7.3 02/11/2022    GLUCOSE 153 02/11/2022    CALCIUM 7.9 02/11/2022            Assessment:      left hand 3rd degree burn with abscess and now s/p I&D. Plan:      1: Continue with soaks and will hold on packing today as only a small amount was used this last time. Do not anticipate need to return to OR at this time.   2:  Continue Deep venous thrombosis prophylaxis  3:  Continue Pain Control    Charmayne Greenland, PA

## 2022-02-11 NOTE — FLOWSHEET NOTE
02/11/22 1200   Vital Signs   Temp 98.3 °F (36.8 °C)   Temp Source Axillary   Pulse 108   Heart Rate Source Monitor   Resp 22   /77   BP Location Left upper arm   Patient Position Semi fowlers   Level of Consciousness Alert (0)   MEWS Score 3   Pain Assessment   Pain Assessment 0-10   Pain Level 0   Oxygen Therapy   SpO2 93 %  (ra)     Dressing to Left hand cleansed and changed at this time.  Per ORTHO, do not pack wound until further notice

## 2022-02-11 NOTE — FLOWSHEET NOTE
Pt transferred from ICU. Skin check completed with Blossom Robins RN. Unstageable on coccyx, burn to L hand,DEE L foot wound d/t wound vac. Shift assessment complete. See flowsheet for full assessment.  No new needs observed, call light within reach.     02/10/22 2224   Vital Signs   Temp 98.4 °F (36.9 °C)   Temp Source Axillary   Pulse 104   Heart Rate Source Monitor   Resp 22   BP (!) 153/68   BP Location Right leg   MAP (mmHg) 96   Patient Position Semi fowlers   Level of Consciousness Alert (0)   MEWS Score 3   Patient Currently in Pain No   Pain Assessment   Pain Assessment Faces   Pain Level 0   Oxygen Therapy   SpO2 96 %  (Room air)

## 2022-02-11 NOTE — PROGRESS NOTES
Nephrology Progress Note   [facility]      This patient is a 42year old male whom we are following for RODRICK, HD dependent. Subjective: The patient was seen and examined. Scheduled to have IJ vas cath today. Had HD yesterday but not able to complete, R femoral vas cath did not work well. BP high. Family History: No family at bedside  ROS: Unable to obtain, patient is awake but does not follow commands. Vitals:  BP (!) 147/82   Pulse 105   Temp 99.4 °F (37.4 °C) (Axillary)   Resp 24   Ht 6' 1\" (1.854 m)   Wt 279 lb 15 oz (127 kg)   SpO2 95% Comment: ra  BMI 36.93 kg/m²   I/O last 3 completed shifts: In: 100 [IV Piggyback:100]  Out: 1319 [Urine:1100; Drains:750]  No intake/output data recorded. Physical Exam:  Physical Exam  Vitals reviewed. Constitutional:       General: He is not in acute distress. HENT:      Head: Normocephalic and atraumatic. Cardiovascular:      Rate and Rhythm: Normal rate. Pulmonary:      Effort: Pulmonary effort is normal. No respiratory distress. Abdominal:      General: There is no distension. Tenderness: There is no abdominal tenderness. Musculoskeletal:      Right lower leg: Edema present. Left lower leg: Edema present.        Access: R femoral vas cath      Medications:   insulin glargine  25 Units SubCUTAneous BID    carvedilol  6.25 mg Oral BID WC    insulin lispro  0-18 Units SubCUTAneous Q4H    sodium chloride flush  5-40 mL IntraVENous 2 times per day    povidone-iodine   Topical Daily    pantoprazole  40 mg IntraVENous Daily    heparin (porcine)  5,000 Units SubCUTAneous 3 times per day    ampicillin-sulbactam  3,000 mg IntraVENous Q12H    Venelex   Topical BID    sodium chloride flush  5-40 mL IntraVENous 2 times per day         Labs:  Recent Labs     02/09/22  0400 02/10/22  0400 02/11/22  0425   WBC 8.8 8.2 8.8   HGB 7.5* 7.8* 8.2*   HCT 22.1* 23.1* 24.0*   MCV 89.9 91.3 90.1    388 386     Recent Labs 02/09/22  0400 02/10/22  0400 02/11/22  0425   * 141 137   K 4.5 4.8 5.4*   CL 96* 101 99   CO2 21 21 18*   GLUCOSE 227* 139* 153*   PHOS 7.7* 10.9* 9.9*   BUN 64* 86* 76*   CREATININE 6.0* 7.9* 7.3*   LABGLOM 10* 7* 8*   GFRAA 12* 9* 10*            Assessment/      RODRICK likely related to prerenal factors in the setting of sepsis, use of diuretics and losartan contributing to multifactorial ATN. Bill Feliciano is not suggestive of staph associated glomerulonephritis.  Patient did not respond to IV fluids and developed acute pulmonary edema and renal replacement therapy initiated on 1/23/22        on iHD TThS. Seems to be making more urine, non-oliguric.     -Acute pulmonary edema, improving with dialysis.     -S/P Cardio respiratory arrest       -Hyperkalemia     -Sepsis with MSSA bacteremia with left foot abscess s/p drainage, osteomyelitis, left hand I&D     -Anemia - prn prbc's     -Diabetes, uncontrolled     -Hypertension    -Hyperphosphatemia     Plan/   -HD tomorrow. Scheduled to get a new vas cath today per IR.  -Follow signs of renal recovery  -femoral vas cath can be removed one new access is in.  -increase Carvedilol to 12.5mg BID. -Retacrit 5K units qHD.  -renal dose medications   -avoid nephrotoxins    Please do not hesitate to contact me at [phone] if with questions. Thank you! Kati Irby MD  [facility]  2/11/2022

## 2022-02-11 NOTE — PROGRESS NOTES
Pt completed 128 mins of 180 min hemodialysis treatment and remove 900ml of fluid. Unable to achieve the prescribed blood flow rate of 400ml, it ranged from 350-200ml/min during this session. Blood flow responded well to repositioning the abdomen, leg and head initially. The last hour this nurse flushed the extra corporeal circuit with normal saline and reversed lumen flow which was marginally effective, though  Intermittent vascath malfunction continued. Catheter dwelled with heparin post treatment.         02/10/22 1825 02/10/22 2100   Vital Signs   Temp 98.2 °F (36.8 °C) 98.3 °F (36.8 °C)   Pulse 83 93   Resp (!) 41 (!) 35   BP (!) 185/70 (!) 185/68   Height and Weight   Weight 281 lb 12 oz (127.8 kg) 279 lb 15.8 oz (127 kg)   Weight Method Bed scale Bed scale

## 2022-02-11 NOTE — FLOWSHEET NOTE
02/11/22 1127   Vital Signs   Temp 99.3 °F (37.4 °C)   Temp Source Axillary   Pulse 110   Heart Rate Source Monitor   Resp 24   /79   BP Location Left upper arm   Patient Position Semi fowlers   Level of Consciousness Alert (0)   MEWS Score 3   Pain Assessment   Pain Assessment 0-10   Pain Level 0   Long-Baker Pain Rating 0   Oxygen Therapy   SpO2 93 %  (ra)     Vitals as shown. Dressing intact.

## 2022-02-11 NOTE — PROGRESS NOTES
BP (!) 147/82   Pulse 105   Temp 99.4 °F (37.4 °C) (Axillary)   Resp 24   Ht 6' 1\" (1.854 m)   Wt 279 lb 15 oz (127 kg)   SpO2 95% Comment: ra  BMI 36.93 kg/m²     Assessment complete. Meds passed. Pt denies needs at this time. Rectal tube, rea, PICC line, and vasath in place. PICC line infusing Antibiotic. Rea putting out urine with clots. Pt denies pain by shaking his head no when prompted. Wound care nurse changing wound-vac at this time. Pt unable to follow command to open mouth for this RN to take temperature. Bedside Mobility Assessment Tool (BMAT):     Assessment Level 1- Sit and Shake    1. From a semi-reclined position, ask patient to sit up and rotate to a seated position at the side of the bed. Can use the bedrail. 2. Ask patient to reach out and grab your hand and shake making sure patient reaches across his/her midline. Fail- Patient is unable to perform tasks, patient is MOBILITY LEVEL 1. Assessment Level 2- Stretch and Point   1. With patient in seated position at the side of the bed, have patient place both feet on the floor (or stool) with knees no higher than hips. 2. Ask patient to stretch one leg and straighten the knee, then bend the ankle/flex and point the toes. If appropriate, repeat with the other leg. Fail- Patient is unable to complete task. Patient is MOBILITY LEVEL 2. Assessment Level 3- Stand   1. Ask patient to elevate off the bed or chair (seated to standing) using an assistive device (cane, bedrail). 2. Patient should be able to raise buttocks off be and hold for a count of five. May repeat once. Fail- Patient unable to demonstrate standing stability. Patient is MOBILITY LEVEL 3. Assessment Level 4- Walk   1. Ask patient to march in place at bedside. 2. Then ask patient to advance step and return each foot. Some medical conditions may render a patient from stepping backwards, use your best clinical judgement.    Fail- Patient not able to complete tasks OR requires use of assistive device. Patient is MOBILITY LEVEL 3.        Mobility Level- 1

## 2022-02-12 NOTE — PROGRESS NOTES
Speech Language Pathology  Attempt Note    Attempted to see pt for dysphagia f/u. RN reports pt receiving dialysis. Will re-attempt when pt available and as SLP schedule allows. Park Alvarez M.A.  [facility]  Speech Language Pathologist

## 2022-02-12 NOTE — PROGRESS NOTES
Nephrology Progress Note   [facility]      This patient is a 42year old male whom we are following for RODRICK, HD dependent. Subjective: The patient was seen and examined; he feels well today with no CP, SOB, nausea or vomiting. ROS: No fever or chills. Social: Family at bedside. Vitals:  /75   Pulse 114   Temp 96.6 °F (35.9 °C)   Resp 20   Ht 6' 1\" (1.854 m)   Wt 244 lb 14.9 oz (111.1 kg)   SpO2 92%   BMI 32.31 kg/m²   I/O last 3 completed shifts: In: 0   Out: 1600 [Urine:300; Drains:750; Stool:550]  I/O this shift: In: 400   Out: 2400     Physical Exam:  Physical Exam  Vitals reviewed. Constitutional:       General: He is not in acute distress. HENT:      Head: Normocephalic and atraumatic. Cardiovascular:      Rate and Rhythm: Normal rate. Pulmonary:      Effort: Pulmonary effort is normal. No respiratory distress. Abdominal:      General: There is no distension. Tenderness: There is no abdominal tenderness. Musculoskeletal:      Right lower leg: Edema present. Left lower leg: Edema present.        Access: R IJ VasCath      Medications:   carvedilol  12.5 mg Oral BID WC    epoetin angeles-epbx  5,000 Units IntraVENous Once per day on Tue Thu Sat    insulin glargine  25 Units SubCUTAneous BID    insulin lispro  0-18 Units SubCUTAneous Q4H    sodium chloride flush  5-40 mL IntraVENous 2 times per day    povidone-iodine   Topical Daily    pantoprazole  40 mg IntraVENous Daily    heparin (porcine)  5,000 Units SubCUTAneous 3 times per day    ampicillin-sulbactam  3,000 mg IntraVENous Q12H    Venelex   Topical BID    sodium chloride flush  5-40 mL IntraVENous 2 times per day         Labs:  Recent Labs     02/10/22  0400 02/11/22  0425 02/12/22  0435   WBC 8.2 8.8 8.3   HGB 7.8* 8.2* 7.8*   HCT 23.1* 24.0* 23.0*   MCV 91.3 90.1 91.5    386 326     Recent Labs     02/10/22  0400 02/11/22  0425 02/12/22  0435    137 141   K 4.8 5.4* 5.9*    99 100   CO2 21 18* 18*   GLUCOSE 139* 153* 103*   PHOS 10.9* 9.9* 11.9*   BUN 86* 76* 93*   CREATININE 7.9* 7.3* 8.9*   LABGLOM 7* 8* 7*   GFRAA 9* 10* 8*            Assessment/      RODRICK likely related to prerenal factors in the setting of sepsis, use of diuretics and losartan contributing to multifactorial ATN. Karuna Edman is not suggestive of staph associated glomerulonephritis.  Patient did not respond to IV fluids and developed acute pulmonary edema and renal replacement therapy initiated on 1/23/22        on iHD TThS.  Seems to be making more urine, non-oliguric.     -Acute pulmonary edema, improving with dialysis.     -S/P Cardio respiratory arrest       -Hyperkalemia     -Sepsis with MSSA bacteremia with left foot abscess s/p drainage, osteomyelitis, left hand I&D     -Anemia - prn prbc's     -Diabetes, uncontrolled     -Hypertension    -Hyperphosphatemia     Plan/     -HD today then again on Monday  -Monitor for any signs of renal recovery  -Retacrit 5K units qHD.  -renal dose medications   -avoid nephrotoxins

## 2022-02-12 NOTE — PROGRESS NOTES
Urology Progress Note    Subjective: I am ok. HPI: Patient has been admitted for sepsis. P/E  /75   Pulse 114   Temp 96.6 °F (35.9 °C)   Resp 20   Ht 6' 1\" (1.854 m)   Wt 244 lb 14.9 oz (111.1 kg)   SpO2 92%   BMI 32.31 kg/m²   Skin: Intact  Respiratory: Breathing without difficulty, stable. GI: No acute changes, stable po intake. : rea in place, clear. MSK: No acute changes, stable. Neuro: No acute changes, stable. Labs  Lab Results   Component Value Date    WBC 8.3 02/12/2022    HGB 7.8 02/12/2022    HCT 23.0 02/12/2022    MCV 91.5 02/12/2022     02/12/2022     Lab Results   Component Value Date     02/12/2022    K 5.9 02/12/2022    K 3.7 01/23/2022     02/12/2022    CO2 18 02/12/2022    BUN 93 02/12/2022    CREATININE 8.9 02/12/2022    CALCIUM 7.2 02/12/2022       Assessment/Plan  Overall stable. Hematuria has cleared. Will follow, hold from cysto.     Electronically signed by Herminia Blanco MD on 2/12/2022 at 1:45 PM

## 2022-02-12 NOTE — FLOWSHEET NOTE
02/12/22 0715 02/12/22 1031   Treatment   Time On 0731  --    Time Off  --  1031   Vital Signs   BP (!) 152/84 131/75   Temp 99.3 °F (37.4 °C) 96.6 °F (35.9 °C)   Pulse 107 114   Resp 22 20   Weight 249 lb 5.4 oz (113.1 kg) 244 lb 14.9 oz (111.1 kg)   Post-Hemodialysis Assessment   NET Removed (ml)  --  2000 ml     Treatment time: 3 hours  Net UF: 2000ml    Pre weight: 113.1kg  Post weight: 111.1kg  EDW: TBD, pt RODRICK    Access used: central line  Access function: good    Medications or blood products given: Retacrit 5000 units    Regular outpatient schedule: RODRICK    Summary of response to treatment: pt tolerated HD well, vital signs stable, total removed 2000ml. Copy of dialysis treatment record placed in chart, to be scanned into EMR.     Electronically signed by Natasha Camargo RN on 2/12/2022 at 10:40 AM

## 2022-02-12 NOTE — PROGRESS NOTES
Shift assessment complete, scheduled meds given. Call light and bedside table in reach. Pt resting quietly and not responding to questions but follows with eyes. Pt does not appear to be in any distress. Will continue to monitor.

## 2022-02-12 NOTE — CONSULTS
2/12/2022  Rebecca Dodgefrancisco j Osei    Reason for Consult:  Hematuria  Requesting Physician:  Ivelisse      History Obtained From:  patient, electronic medical record, Ivelisse    HISTORY OF PRESENT ILLNESS:                The patient is a 42 y.o. male who presents with septic shock. He has had a long hospital stay to date with sepsis, abscess formation and pulmonary failure. He was recently transferred form the unit and has developed hematuria.     Past Medical History:        Diagnosis Date    Abscess of left foot 1/24/2022    Acute osteomyelitis of right hallux ([address] 08/2019    Cellulitis of left foot 7/26/2020    CHF (congestive heart failure) ([address] 09/2014    presumably from viral myocarditis    Chronic osteomyelitis of left foot ([address] 10/27/2020    Closed displaced fracture of distal phalanx of right little finger 4/8/2021    Clostridium difficile infection 11/01/2016    Diabetic ulcer of left forefoot associated with type 2 diabetes mellitus, with necrosis of bone ([address] 8/1/2019    Diabetic ulcer of right great toe associated with type 2 diabetes mellitus, with necrosis of bone ([address] 08/2019    Failed soft 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 toe amputation site 10/26/2020    History of hyperbaric oxygen therapy 10/28/2020    [facility] - compromised flap    Migraine     Possible perforated tympanic membrane     as a result of recurrent otitis    Post-op hematoma of left foot 11/12/2020    Recurrent otitis media     Septic shock (HCC)     Syncope     Tear of medial meniscus of left knee     Tobacco use     Toe osteomyelitis, left ([address] 7/24/2020     Past Surgical History:        Procedure Laterality Date    ARM SURGERY Left 2/5/2022    LEFT HAND INCISION AND DRAINAGE performed by Denia Gibson MD at [address] Left 2/1/2022    ULCER DEBRIDEMENT LEFT FOOT performed by Tay Will DPM at [address]  ARTHROSCOPY Left [id number]    LEFT KNEE ARTHROSCOPY , SYNOVECTOMY       OTHER SURGICAL HISTORY Left 07/24/2020    PARTIAL LEFT FOOT AMPUTATION    TOE AMPUTATION Right 8/23/2019    PARTIAL RIGHT FOOT AMPUTATION performed by Yao Rodriguez DPM at [facility] [address] TOE AMPUTATION Left 7/24/2020    PARTIAL LEFT FOOT AMPUTATION performed by Yao Rodriguez DPM at [facility] [address] TOE AMPUTATION Left 10/22/2020    PARTIAL LEFT FOOT AMPUTATION WITH GRAFT APPLICATION performed by Yao Rodriguez DPM at [facility]       Current Medications:   Current Facility-Administered Medications: carvedilol (COREG) tablet 12.5 mg, 12.5 mg, Oral, BID WC  epoetin angeles-epbx (RETACRIT) injection 5,000 Units, 5,000 Units, IntraVENous, Once per day on Tue Thu Sat  hydrALAZINE (APRESOLINE) injection 20 mg, 20 mg, IntraVENous, Q6H PRN  labetalol (NORMODYNE;TRANDATE) injection 20 mg, 20 mg, IntraVENous, Q4H PRN  insulin glargine (LANTUS) injection vial 25 Units, 25 Units, SubCUTAneous, BID  insulin lispro (HUMALOG) injection vial 0-18 Units, 0-18 Units, SubCUTAneous, Q4H  sodium chloride flush 0.9 % injection 5-40 mL, 5-40 mL, IntraVENous, 2 times per day  sodium chloride flush 0.9 % injection 5-40 mL, 5-40 mL, IntraVENous, PRN  0.9 % sodium chloride infusion, 25 mL, IntraVENous, PRN  povidone-iodine (BETADINE) 10 % external solution, , Topical, Daily  0.9 % sodium chloride infusion, , IntraVENous, PRN  pantoprazole (PROTONIX) injection 40 mg, 40 mg, IntraVENous, Daily  heparin (porcine) injection 5,000 Units, 5,000 Units, SubCUTAneous, 3 times per day  0.9 % sodium chloride infusion, , IntraVENous, PRN  heparin (porcine) injection 3,000 Units, 3,000 Units, IntraVENous, PRN  ampicillin-sulbactam (UNASYN) 3000 mg ivpb minibag, 3,000 mg, IntraVENous, Q12H  sodium chloride 0.9 % irrigation 1,000 mL, 1,000 mL, Irrigation, Continuous PRN  Venelex ointment, , Topical, BID  albumin human 25 % IV solution 12.5 g, 12.5 g, IntraVENous, PRN  heparin (porcine) injection 2,600 Units, 2,600 Units, IntraCATHeter, PRN  sodium chloride flush 0.9 % injection 5-40 mL, 5-40 mL, IntraVENous, 2 times per day  sodium chloride flush 0.9 % injection 5-40 mL, 5-40 mL, IntraVENous, PRN  0.9 % sodium chloride infusion, 25 mL, IntraVENous, PRN  acetaminophen (TYLENOL) tablet 650 mg, 650 mg, Oral, Q6H PRN **OR** acetaminophen (TYLENOL) suppository 650 mg, 650 mg, Rectal, Q6H PRN  glucose (GLUTOSE) 40 % oral gel 15 g, 15 g, Oral, PRN  glucagon (rDNA) injection 1 mg, 1 mg, IntraMUSCular, PRN  dextrose 5 % solution, 100 mL/hr, IntraVENous, PRN  dextrose bolus (hypoglycemia) 10% 125 mL, 125 mL, IntraVENous, PRN **OR** dextrose bolus (hypoglycemia) 10% 250 mL, 250 mL, IntraVENous, PRN  Allergies: Allergies   Allergen Reactions    Januvia [Sitagliptin] Nausea Only     Has taken metformin without side effects in the past.  Nausea with Janumet in the past.     Metformin And Related      GI Upset    Vancomycin      Pt had red face, swelling itching of eyelids, sore throat after receiving vancmomyin and cefepime. I think this was a histamine releasing reaction from vancomycin most likely. The cefepime was switched to Zosyn and patient had no reaction to Zosyn    Mustard [employer] Isothiocyanate] Swelling and Rash     Social History:  Reviewed, non contributory    Family History:  Reviewed, non contributory      REVIEW OF SYSTEMS:    12 point ROS has been completed and reviewed    PHYSICAL EXAM:    VITALS:  BP (!) 156/92   Pulse 100   Temp 99.1 °F (37.3 °C) (Oral)   Resp 24   Ht 6' 1\" (1.854 m)   Wt 252 lb 6 oz (114.5 kg)   SpO2 92%   BMI 33.30 kg/m²   H&N: Sclera normal, no masses, trachea midline, no bruit  CVS: Normal rate and rhythm, no murmurs or rubs, peripheral pulses equal, no clubbing or cyanosis. RESP: Breath sounds equal bilateral, few rhonchi. ABDO: Soft, non-tender, bowel sounds active, no organomegaly, no hernias.   LYMPH:  No lymphadenopathy. Skin: Warm dry and intact. : No CVAT, normal external genitalia with rea, gross hematuria, no discharge. MSK: Grossly normal for patient  DAT: Grossly normal for patient  PSY: No acute changes noted in psychosocial assessment. DATA:    CBC:   Lab Results   Component Value Date    WBC 8.3 02/12/2022    RBC 2.52 02/12/2022    HGB 7.8 02/12/2022    HCT 23.0 02/12/2022    MCV 91.5 02/12/2022    MCH 31.0 02/12/2022    MCHC 33.9 02/12/2022    RDW 15.9 02/12/2022     02/12/2022    MPV 5.4 02/12/2022     BMP:    Lab Results   Component Value Date     02/12/2022    K 5.9 02/12/2022    K 3.7 01/23/2022     02/12/2022    CO2 18 02/12/2022    BUN 93 02/12/2022    LABALBU 2.6 02/12/2022    CREATININE 8.9 02/12/2022    CALCIUM 7.2 02/12/2022    GFRAA 8 02/12/2022    LABGLOM 7 02/12/2022    LABGLOM 103 09/14/2015    GLUCOSE 103 02/12/2022     U/A:    Lab Results   Component Value Date    NITRITE trace 05/02/2019    COLORU RED 02/06/2022    PROTEINU >=300 02/06/2022    PHUR 7.5 02/06/2022    WBCUA 21-50 02/06/2022    RBCUA >100 02/06/2022    BACTERIA Rare 01/22/2022    CLARITYU TURBID 02/06/2022    SPECGRAV 1.025 02/06/2022    LEUKOCYTESUR Negative 02/06/2022    UROBILINOGEN 0.2 02/06/2022    BILIRUBINUR SMALL 02/06/2022    BILIRUBINUR neg 05/02/2019    BLOODU LARGE 02/06/2022    GLUCOSEU 100 02/06/2022       IMPRESSION/RECOMMENDATIONS:      Gross hematuria likely from the rea and current medications, treatment. Maintain rea cath for now and irrigate as needed. Check urine culture. Will follow, but hold from cysto for now. Thank you for asking me to see this interesting patient.     Ghassan Crocker MD
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
## 2022-02-12 NOTE — PROGRESS NOTES
Pt is alert and times. He will follow objects with his eyes periodically. Does not show emotion or response per facial features. Will not follow commands. Has not been verbal with this writer. Pt did go to dialysis this AM with reportedly 2 liters removed. Wife and sister here with patient and have been trying to work with patient. Wound vac remains to LLE, working properly. Left hand cleansed and redressed. Area in pink and moist, no drainage noted to wound. Rectal tube in place. Urinary catheter in place and draining to gravity. Patient does have a darkened area to the back of his head. Wife states that it has been there for a few days. All skin is intact, with no drainage noted. Darkened area to right ear, area cleansed and prescribed ointment applied. All skin intact w/o drainage noted.

## 2022-02-13 NOTE — PROGRESS NOTES
02/13/22 0733   Vent Information   Vt Ordered 430 mL   Rate Set 28 bmp   Peak Flow 80 L/min   Pressure Support 0 cmH20   FiO2  35 %   SpO2 99 %   SpO2/FiO2 ratio 282.86   Sensitivity 3   PEEP/CPAP 5   I Time/ I Time % 0 s   Humidification Source Heated wire   Humidification Temp Measured 37   Vent Patient Data   High Peep/I Pressure 0   Peak Inspiratory Pressure 20 cmH2O   Mean Airway Pressure 10 cmH20   Rate Measured 29 br/min   Vt Exhaled 467 mL   Minute Volume 11.9 Liters   I:E Ratio 1:2.70   Plateau Pressure 15 NUT46   Cough/Sputum   Sputum How Obtained Suctioned;Endotracheal   Cough Productive   Sputum Amount Moderate   Sputum Color Yellow; Tan   Tenacity Thick   Spontaneous Breathing Trial (SBT) RT Doc   Pulse 89   Breath Sounds   Right Upper Lobe Diminished   Right Middle Lobe Diminished   Right Lower Lobe Diminished   Left Upper Lobe Diminished   Left Lower Lobe Diminished   Additional Respiratory  Assessments   Resp 25   Position Supine   Oral Care Completed? Yes   Oral Care Mouth suctioned   Subglottic Suction Done?  Yes   Alarm Settings   High Pressure Alarm 40 cmH2O   Low Minute Volume Alarm 4 L/min   High Respiratory Rate 50 br/min   Patient Observation   Observations #[id number] [email or web address]

## 2022-02-13 NOTE — PROGRESS NOTES
Dr. Colonel Bowers at bedside for intubation. 2788: Versed 2mg administered by BERHANE Ramon RN. .  6488: 20mg Etomidate administered by BERHANE Means RN.  1799: #8.0 ET tube passed through the vocal cords to the 26cm line at the lip by Dr. Colonel Bowers. Positive color change noted, bilateral breath sounds auscultated. 4280: OG passed to the 70cm ignacio and secured to the ET tube. Positive air bolus auscultated. 6497: Radiology called for STAT CXR to confirm tube placement.
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
## 2022-02-13 NOTE — PROGRESS NOTES
Patient transferred to ICU at this time. Care transferred to [facility]. Patient belongings in room with pt. Attempted to call spouse Rebecca Forth, she did not answer.      Jose De Jesus Solis RN unknown

## 2022-02-13 NOTE — PROGRESS NOTES
Shift assessment completed, see flow sheet. RASS -2.  - Propofol infusing at 30 mcg/kg/min. Intubated and sedated on AC # 8 ETT, at 26 LL. 28 / 430 / 35 %/ +5. SpO2 99%. Respirations are easy, even, and unlabored. Bilateral lung sounds diminised. VSS  OG in place at 70. PICC/Vascath, WNL. All lines and monitoring devices in place. Munoz is patent and secured. Bilateral soft wrist restraints in place for patient safety. Bed in lowest position with wheels locked.

## 2022-02-13 NOTE — FLOWSHEET NOTE
02/13/22 0557   Vital Signs   Pulse 95   Resp 30   /76   MAP (mmHg) 89   Oxygen Therapy   SpO2 99 %   FiO2  35 %   Patient intubated, RASS of -1, tolerating vent, on propofol at 20mcg/kg/min, vasopressin at 0.03, cardizem at 20mg/hr, Saline bolus given. VS stable, HR improving, order received to do EKG when HR below 100, restraints placed. Will continue to monitor.

## 2022-02-13 NOTE — PROGRESS NOTES
02/13/22 0459   Vent Information   Vent Mode AC/VC   Vt Ordered 430 mL   Rate Set 28 bmp   Peak Flow 80 L/min   FiO2  35 %   SpO2 99 %   Sensitivity 3   PEEP/CPAP 5   Vent Patient Data   Peak Inspiratory Pressure 19 cmH2O   Mean Airway Pressure 11 cmH20   Rate Measured 35 br/min   Vt Exhaled 433 mL   Minute Volume 16 Liters   I:E Ratio 1:1.70   Cough/Sputum   Sputum How Obtained Suctioned;Endotracheal   Cough Productive   Sputum Amount Moderate   Sputum Color Yellow;Red   Tenacity Thick   Patient Observation   Observations #[email or web address]

## 2022-02-13 NOTE — PROGRESS NOTES
2045 Patient sounds very wet audibly in room, respiratory to NT suction. 2100 CMU notified writer of pt HR in 160's appearing to be SVT on the monitor. Upon visualization, pt appears to be struggling, 40 RR, 160 bpm, 114/66 BP.     2120 Charge and clinical RN present at bedside. Perfect serve sent to Dr. Robert Roblero still sustaining 160 BPM. Order for stat ekg, chest xray and abg placed per Dr. Robbie Iglesias. 2135 NT suctioned patient with large amounts of thick return, lungs sound better. 45-50 RR, -160. ABG obtained and sent to lab. EKG shown atrial flutter 2:1 av conduction. Dr Robbie conde served.

## 2022-02-13 NOTE — SIGNIFICANT EVENT
RAPID RESPONSE:    Called to room for tachycardia and sats mid 80's in room air. Pt with audible secretions after being NT suctioned by RT. CXR, ABG and EKG ordered. ABG drawn from right radial. Two atempts at 2037. Sight prepped per policy and procedure. Modified Jack's test positive. Pressure held to sight after procedure for five minutes. No hematoma present. Pulse present after procedure. Pt tolerated procedure well. Specimen sent to lab. Pt 93% at 2152 with NRB in place. Nasal trumpet in place so RT phoned to assess for best O2 delivery. [employer] [facility]         Results for Noam Villavicencio (MRN [id number]) as of 2/12/2022 21:55   Ref. Range 2/12/2022 21:37   Hemoglobin, Art, Extended Latest Ref Range: 13.5 - 17.5 g/dL 9.2 (L)   pH, Arterial Latest Ref Range: 7.350 - 7.450  7.468 (H)   pCO2, Arterial Latest Ref Range: 35.0 - 45.0 mmHg 22.7 (L)   pO2, Arterial Latest Ref Range: 75.0 - 108.0 mmHg 72.4 (L)   HCO3, Arterial Latest Ref Range: 21.0 - 29.0 mmol/L 16.1 (L)   TCO2 (calc), Art Latest Ref Range: Not Established mmol/L 16.8   Base Excess, Arterial Latest Ref Range: -3.0 - 3.0 mmol/L -6.4 (L)   O2 Sat, Arterial Latest Ref Range: >92 % 95.8   Methemoglobin, Arterial Latest Ref Range: <1.5 % 0.0   Carboxyhgb, Arterial Latest Ref Range: 0.0 - 1.5 % 0.3       @2215 Nocturnist at bedside. Lasix 80 mg one time and Cardizem gtt ordered. Results for Noam Villavicencio (MRN [id number]) as of 2/13/2022 00:55   Ref.  Range 2/12/2022 23:00   Troponin Latest Ref Range: <0.01 ng/mL 0.20 (H)

## 2022-02-13 NOTE — PROGRESS NOTES
Nephrology Progress Note   [facility]      This patient is a 42year old male whom we are following for RODRICK, HD dependent. Subjective: The patient was seen and examined during dialysis; he was transferred to the ICU overnight with worsening respiratory distress requiring intubation. ROS: No fever or chills. Social: Family at bedside. Vitals:  BP 98/68   Pulse 115   Temp 100.3 °F (37.9 °C) (Bladder)   Resp 30   Ht 6' 1\" (1.854 m)   Wt 244 lb 14.9 oz (111.1 kg)   SpO2 99%   BMI 32.31 kg/m²   I/O last 3 completed shifts: In: 1014.6 [I.V.:614.6]  Out: 1722 [Urine:120]  I/O this shift:  In: -   Out: 200 [Stool:200]    Physical Exam:  Physical Exam  Vitals reviewed. Constitutional:       General: He is not in acute distress. HENT:      Head: Normocephalic and atraumatic. Cardiovascular:      Rate and Rhythm: Normal rate. Pulmonary:      Effort: Pulmonary effort is normal. No respiratory distress. Abdominal:      General: There is no distension. Tenderness: There is no abdominal tenderness. Musculoskeletal:      Right lower leg: Edema present. Left lower leg: Edema present.        Access: R IJ VasCath      Medications:   cefepime  1,000 mg IntraVENous Q24H    linezolid  600 mg IntraVENous Q12H    lidocaine        b complex-C-folic acid  1 capsule Oral Daily    carvedilol  12.5 mg Oral BID WC    epoetin angeles-epbx  5,000 Units IntraVENous Once per day on Tue Thu Sat    insulin glargine  25 Units SubCUTAneous BID    insulin lispro  0-18 Units SubCUTAneous Q4H    sodium chloride flush  5-40 mL IntraVENous 2 times per day    povidone-iodine   Topical Daily    pantoprazole  40 mg IntraVENous Daily    heparin (porcine)  5,000 Units SubCUTAneous 3 times per day    ampicillin-sulbactam  3,000 mg IntraVENous Q12H    Venelex   Topical BID    sodium chloride flush  5-40 mL IntraVENous 2 times per day         Labs:  Recent Labs     02/12/22  0435 02/12/22  2300 02/13/22  0510   WBC 8.3 10.1 11.4*   HGB 7.8* 7.9* 7.3*   HCT 23.0* 23.6* 22.5*   MCV 91.5 92.8 91.9    264 308     Recent Labs     02/11/22  0425 02/12/22  0435 02/13/22  0510    141 139   K 5.4* 5.9* 5.6*   CL 99 100 99   CO2 18* 18* 19*   GLUCOSE 153* 103* 171*   PHOS 9.9* 11.9* 8.9*   BUN 76* 93* 69*   CREATININE 7.3* 8.9* 7.0*   LABGLOM 8* 7* 9*   GFRAA 10* 8* 10*            Assessment/      RODRICK likely related to prerenal factors in the setting of sepsis, use of diuretics and losartan contributing to multifactorial ATN. Elwyn Hammer is not suggestive of staph associated glomerulonephritis.  Patient did not respond to IV fluids and developed acute pulmonary edema and renal replacement therapy initiated on 1/23/22        on iHD TThS.  Seems to be making more urine, non-oliguric.     -Acute pulmonary edema, improving with dialysis.     -S/P Cardio respiratory arrest       -Hyperkalemia     -Sepsis with MSSA bacteremia with left foot abscess s/p drainage, osteomyelitis, left hand I&D     -Anemia - prn prbc's     -Diabetes, uncontrolled     -Hypertension    -Hyperphosphatemia     Plan/     -HD ongoing today, then again on Tuesday  -Retacrit 5K units qHD.  -renal dose medications   -avoid nephrotoxins

## 2022-02-13 NOTE — PROGRESS NOTES
Urology Progress Note    Subjective: Patient is intubated and sedated. HPI: Patient has been admitted for pulmonary failure. I was asked to see him for hematuria. P/E  BP 94/67   Pulse 107   Temp 100.8 °F (38.2 °C)   Resp 29   Ht 6' 1\" (1.854 m)   Wt 240 lb 15.4 oz (109.3 kg)   SpO2 100%   BMI 31.79 kg/m²   Skin: Intact  Respiratory: Breathing without difficulty, stable. GI: No acute changes, stable po intake. : rea in place, dark urine bot no overt bleeding. MSK: No acute changes, stable. Neuro: No acute changes, stable. Labs  Lab Results   Component Value Date    WBC 11.4 02/13/2022    HGB 7.3 02/13/2022    HCT 22.5 02/13/2022    MCV 91.9 02/13/2022     02/13/2022     Lab Results   Component Value Date     02/13/2022    K 5.6 02/13/2022    K 3.7 01/23/2022    CL 99 02/13/2022    CO2 19 02/13/2022    BUN 69 02/13/2022    CREATININE 7.0 02/13/2022    CALCIUM 7.8 02/13/2022       Assessment/Plan  Pulmonary failure. At this point no acute  problems are present. Maintain rea for fluid management. Will sign off for now, call for further questions.     Electronically signed by Carlos Hernandez MD on 2/13/2022 at 2:27 PM

## 2022-02-13 NOTE — PROGRESS NOTES
Hospitalist Progress Note      PCP: Yancy Rivera MD    Date of Admission: 1/22/2022    Chief Complaint: [address] Course: 46m with history DM, CHF,tobacco abuse, presents with fatigue and hypoxemia, and MSSA bacteremia from L foot/hand wound for which he was started on Unasyn. Found to be fluid overloaded with RODRICK requiring 5 lpm NC O2 despite no prior home O2 requirement. Became progressively hypoxemic, cp arrested on 1/23. He initially required CRRT, is currently on iHD TThS, non oliguric. He was intubated until 2/9, and has been on NC O2. He remains unresponsive, unable to follow commands, however tracks and opens eyes to name. Called to see patient due to afib RVR, respiratory distress. Subjective:  Patient with continued RR40's despite lasix with <100 mL out.         Medications:  Reviewed    Infusion Medications    propofol 10 mcg/kg/min (02/13/22 0450)    vasopressin (Septic Shock) infusion      dilTIAZem 5 mg/hr (02/12/22 2148)    sodium chloride 5 mL/hr at 02/09/22 1630    sodium chloride      sodium chloride      sodium chloride      sodium chloride Stopped (02/07/22 0806)    dextrose       Scheduled Medications    digoxin  125 mcg IntraVENous Once    midazolam        digoxin        midazolam  2 mg IntraVENous Once    cefepime  2,000 mg IntraVENous Q12H    b complex-C-folic acid  1 capsule Oral Daily    carvedilol  12.5 mg Oral BID WC    epoetin angeles-epbx  5,000 Units IntraVENous Once per day on Tue Thu Sat    insulin glargine  25 Units SubCUTAneous BID    insulin lispro  0-18 Units SubCUTAneous Q4H    sodium chloride flush  5-40 mL IntraVENous 2 times per day    povidone-iodine   Topical Daily    pantoprazole  40 mg IntraVENous Daily    heparin (porcine)  5,000 Units SubCUTAneous 3 times per day    ampicillin-sulbactam  3,000 mg IntraVENous Q12H    Venelex   Topical BID    sodium chloride flush  5-40 mL IntraVENous 2 times per day     PRN Meds: hydrALAZINE, labetalol, sodium chloride flush, sodium chloride, sodium chloride, sodium chloride, heparin (porcine), sodium chloride, albumin human, heparin (porcine), sodium chloride flush, sodium chloride, acetaminophen **OR** acetaminophen, glucose, glucagon (rDNA), dextrose, dextrose bolus (hypoglycemia) **OR** dextrose bolus (hypoglycemia)      Intake/Output Summary (Last 24 hours) at 2/13/2022 0519  Last data filed at 2/12/2022 1031  Gross per 24 hour   Intake 400 ml   Output 2400 ml   Net -2000 ml       Physical Exam Performed:    /73   Pulse 171   Temp 98.2 °F (36.8 °C) (Axillary)   Resp (!) 35   Ht 6' 1\" (1.854 m)   Wt 244 lb 14.9 oz (111.1 kg)   SpO2 99%   BMI 32.31 kg/m²     General appearance: intubated / sedated  HEENT: Pupils equal, round, and reactive to light. Conjunctivae/corneas clear. Neck: Supple, with full range of motion. No jugular venous distention. Trachea midline. Respiratory:  Tachypneic, (+) bilat rhonchi, no wheeze, equal breath sounds bilaterally  Cardiovascular: tachy, irregularly irreg  Abdomen: Soft, non-tender, non-distended  obese  Musculoskeletal: No clubbing, cyanosis (+) edema bilat         Labs:   Recent Labs     02/11/22 0425 02/12/22 0435 02/12/22  2300   WBC 8.8 8.3 10.1   HGB 8.2* 7.8* 7.9*   HCT 24.0* 23.0* 23.6*    326 264     Recent Labs     02/11/22 0425 02/12/22  0435    141   K 5.4* 5.9*   CL 99 100   CO2 18* 18*   BUN 76* 93*   CREATININE 7.3* 8.9*   CALCIUM 7.9* 7.2*   PHOS 9.9* 11.9*     No results for input(s): AST, ALT, BILIDIR, BILITOT, ALKPHOS in the last 72 hours. No results for input(s): INR in the last 72 hours.   Recent Labs     02/12/22  2300 02/13/22  0137   TROPONINI 0.20* 0.20*       Urinalysis:      Lab Results   Component Value Date    NITRU Negative 02/06/2022    WBCUA 21-50 02/06/2022    BACTERIA Rare 01/22/2022    RBCUA >100 02/06/2022    BLOODU LARGE 02/06/2022    SPECGRAV 1.025 02/06/2022    GLUCOSEU 100 02/06/2022 Radiology:  XR CHEST PORTABLE   Final Result   Endotracheal tube and nasogastric tube have been removed. New right jugular   catheter with the distal tip is poorly defined. May be over the inferior   right atrium and consider repeat study to better assess the tip. Patchy opacities in the left lung more than right are redemonstrated. IR NONTUNNELED VASCULAR CATHETER > 5 YEARS   Preliminary Result   Status post successful ultrasound/fluoroscopically guided placement of right   internal jugular temporary dialysis catheter as described above. XR CHEST PORTABLE   Final Result   Low volume study with no significant interval change in diffuse bilateral   opacity which can reflect pulmonary edema or pneumonia. XR CHEST PORTABLE   Final Result   Interval decrease in left-sided pleural effusion. IR PICC WO SQ PORT/PUMP > 5 YEARS   Final Result   Successful placement of PICC line. XR CHEST PORTABLE   Final Result   1. Unchanged position of support devices. 2. No significant change in bilateral airspace disease. XR CHEST PORTABLE   Final Result   Improvement of the previous seen left upper lobe airspace disease. Lines and tubes stable. XR CHEST PORTABLE   Final Result      1. Endotracheal tube 5 cm above the ariadna an NG tube extends into the mid   to distal stomach. The right internal jugular central venous line is   unchanged. 2.  Opacity and volume loss of the left hemithorax which has worsened   suggesting pneumonia a possibly some atelectasis. Ovoid area of opacity in   the right middle lower lung field suggesting additional area of pneumonitis. 3.  Possible left pleural effusion. 4.  Cardiomegaly, unchanged. XR CHEST PORTABLE   Final Result   Stable examination with layering left pleural effusion and right perihilar   airspace disease.   Pulmonary edema and pneumonia are in the differential.         MRI HAND LEFT WO CONTRAST   Final Result   1. Osteomyelitis of the 3rd metacarpal head tapering into the mid shaft. Osteomyelitis of the 3rd proximal phalangeal base and shaft. Moderate 3rd   metacarpophalangeal joint effusion compatible with septic arthritis. 2. Mild marrow edema in the 5th metacarpal head and 5th proximal phalangeal   base with normal T1 signal compatible with noninfectious reactive osteitis   versus less likely early osteomyelitis. 3. Extensive subcutaneous edema compatible with cellulitis. 3 x 2.6 x 0.6 cm   abscess versus phlegmon in the soft tissues dorsal to the 4th and 5th   metacarpals. 4. Extensive edema within the interosseous musculature compatible with   myositis. 5. Mild ulnar palmar bursitis distal to the carpal tunnel. XR CHEST PORTABLE   Final Result   Multifocal opacities in the left lung likely with some left basilar pleural   effusion/atelectasis is again noted. The less prominent opacities in the   right lung are also stable. ET, NG, and right jugular line appear acceptable. XR HAND LEFT (MIN 3 VIEWS)   Final Result   No radiographic evidence of osteomyelitis with technical limitations as above. XR CHEST PORTABLE   Final Result   1. No significant change. XR CHEST PORTABLE   Final Result   Increasing airspace opacification in the right lower lung zone that may   represent underlying pneumonitis. Asymmetric edema may also be considered in   this intubated patient. XR CHEST PORTABLE   Final Result   No significant interval change. MRI FOOT LEFT WO CONTRAST   Final Result   1. Diffuse subcutaneous edema with organized complex collection along the   dorsal soft tissues of the foot which communicates with large midfoot   effusion. The dorsal collection measures 2.0 x 4.0 x 4.1 cm. Findings   highly suspicious for abscess and septic joint given patient history.    2. Marrow signal changes throughout the midfoot and extending along the 2nd   through 5th metatarsals which are most suggestive of osteomyelitis in the   setting of soft tissue infection. Underlying Charcot arthropathy also   present. XR CHEST PORTABLE   Final Result   Cardiomegaly with left basilar effusion and bibasilar infiltrates. Stable support tubes. XR CHEST PORTABLE   Final Result   1. Stable lines, tubes, and support devices. 2. Bilateral airspace opacities with pleural effusions, left greater than   right. 3. Cardiomegaly. XR CHEST PORTABLE   Final Result   1. Stable lines, tubes, and support devices. 2. Stable cardiopulmonary status after accounting for differences in patient   positioning including bilateral airspace opacities. 3. Cardiomegaly. 4. Low lung volumes. CT HEAD WO CONTRAST   Final Result   1. No acute intracranial abnormality. XR CHEST PORTABLE   Final Result   CHF with increasing pulmonary edema. XR CHEST PORTABLE   Final Result   Patchy airspace disease, left greater than right which may represent   atelectasis or pneumonia. XR CHEST PORTABLE   Final Result   Stable support apparatus. Increasing bilateral airspace opacities which may be related to edema and/or   pneumonia. XR CHEST PORTABLE   Final Result   Low lung volumes/poor inspiratory effort limiting the study. No significant improvement. A mild cardiomegaly. Mild congestion and/or   infiltrates identified in the lungs. No pneumothorax. XR FOOT LEFT (2 VIEWS)   Final Result   1. Remote history of amputation at the 1st and 2nd digits. No evidence of   osteomyelitis at prior resection site. 2. Subtle erosions at the 3rd MTP joint are new. This is adjacent to soft   tissue swelling at the prior amputation site. A new focus of osteomyelitis   is suspected. 3. Soft tissue swelling and questionable subcutaneous gas along the lateral   aspect of the left foot.   There is also severe disorganization of bone at the   2nd through 5th tarsometatarsal joints. Although pattern may represent   Charcot arthropathy due to the more diffuse appearance, areas of   osteomyelitis cannot be excluded with plain film. Correlate with physical   exam and clinical workup. A follow-up MRI may be helpful for further   evaluation. XR CHEST PORTABLE   Final Result   Low lung volumes with cardiomegaly and vascular congestion, as well as patchy   airspace disease bilaterally, similar to the previous exam.         XR CHEST PORTABLE   Final Result   Status post advancement of right internal jugular central line with distal   tip now visualized near region of junction of superior vena cava and right   atrium. No evidence of pneumothorax. XR CHEST PORTABLE   Final Result   Improved lung volumes. Bilateral perihilar opacification, edema versus   infiltrate. Satisfactory position of endotracheal tube. Central line tip in either the   distal brachiocephalic vein or proximal SVC. XR CHEST PORTABLE   Final Result   Cardiomegaly with left mid and lower lung infiltrates. Support tubes as described above. XR CHEST 1 VIEW   Final Result   Limited chest as outlined above. Bilateral perihilar opacification, edema   versus infiltrate. XR CHEST PORTABLE   Final Result   Low lung volumes. No acute cardiopulmonary disease.          XR CHEST PORTABLE    (Results Pending)   XR CHEST PORTABLE    (Results Pending)           Assessment/Plan:    Active Hospital Problems    Diagnosis Date Noted    Mild protein-calorie malnutrition ([facility] [E44.1] 02/08/2022    Acute osteomyelitis of left hand [facility]) [M86.142] 02/07/2022    Pressure injury of deep tissue of sacral region [L89.156] 02/07/2022    Abscess of left hand [L02.512]     Infective tenosynovitis of extensor tendons of left hand [M65.142] 02/03/2022    Enterococcal infection [A49.1] 02/03/2022    Staphylococcal arthritis of left foot ([facility] Estella Jarquin 02/01/2022    Antibiotic-associated diarrhea [K52.1, I52.70YX] 02/01/2022    Acute encephalopathy [G93.40]     Hypertriglyceridemia [E78.1]     MSSA bacteremia [R78.81, B95.61] 01/23/2022    Third degree burn of left hand [I73.823J] 01/23/2022    Acute hypoxemic respiratory failure ([address] [J96.01]     Cardiopulmonary arrest ([address] [I46.9]     Acute renal failure ([address] [N17.9] 01/22/2022    Acute osteomyelitis of left foot ([address] [M86.172] 10/26/2020    Mixed hyperlipidemia [E78.2] 04/26/2016    Cardiomyopathy ([address] [I42.9] 09/11/2014    Uncontrolled type 2 diabetes mellitus with diabetic polyneuropathy ([address] [E11.42, E11.65]      Acute on chronic hypoxemic resp failure  - s/p intubation / on vent, 100% on 35% FiO2  - poss hcap, PE? Afib RVR  - cardizem gtts  - serial trop remains flat, ekg with non spec changes    sepsis  - unable to diurese, patient with elev pct, febrile to 101 with purulent secretions sent for cx.  On unasyn for mssa, adding pseudo/mrsa coverage   - vasopressin  - IVF bolus                Tot crit care time > 45 min    Alba Kemp MD

## 2022-02-13 NOTE — PROGRESS NOTES
Pulmonary & Critical Care Medicine ICU Progress Note    CC: Septic shock, MSSA bacteremia, left foot abscess    Events of Last 24 hours:  Overnight the pt developed resp distress and tachycardia and less responsive and was intubated. He was noted to have copious thick secretions. On 2L O2, hypertensive  Developed Aflutter overnight. Now 3:1 on Dilt    Vascular lines:  RUE PICC, Temp IJ vas cath    MV: 1/23/22 - 2/5/22; extubated and re-intubated due to unable to clear secretions/hypoxia on 2/5/22 after less than 12 hours  2/5-2/9/22  reintubated 2/13 fr svt and resp distress with secretions    Vitals:  Blood pressure 111/72, pulse 89, temperature 101.4 °F (38.6 °C), temperature source Bladder, resp. rate 29, height 6' 1\" (1.854 m), weight 244 lb 14.9 oz (111.1 kg), SpO2 100 %. on ra  Constitutional:  No acute distress. Eyes: PERRL. Conjunctivae anicteric. ENT: Normal nose. Normal tongue. Neck:  Trachea is midline. No thyroid tenderness. Respiratory: No accessory muscle usage. decreased breath sounds. No wheezes. No rales. + Rhonchi. Cardiovascular: Normal S1S2. No digit clubbing. No digit cyanosis. No LE edema. Gastrointestinal: No mass palpated. No tenderness palpated. Psychiatric: No anxiety or Agitation.  Alert and Oriented to person      Scheduled Meds:   cefepime  1,000 mg IntraVENous Q24H    linezolid  600 mg IntraVENous Q12H    b complex-C-folic acid  1 capsule Oral Daily    carvedilol  12.5 mg Oral BID     epoetin angeles-epbx  5,000 Units IntraVENous Once per day on Tue Thu Sat    insulin glargine  25 Units SubCUTAneous BID    insulin lispro  0-18 Units SubCUTAneous Q4H    sodium chloride flush  5-40 mL IntraVENous 2 times per day    povidone-iodine   Topical Daily    pantoprazole  40 mg IntraVENous Daily    heparin (porcine)  5,000 Units SubCUTAneous 3 times per day    ampicillin-sulbactam  3,000 mg IntraVENous Q12H    Venelex   Topical BID    sodium chloride flush  5-40 mL IntraVENous 2 times per day       Data:  CBC:   Recent Labs     02/12/22  0435 02/12/22  2300 02/13/22  0510   WBC 8.3 10.1 11.4*   HGB 7.8* 7.9* 7.3*   HCT 23.0* 23.6* 22.5*   MCV 91.5 92.8 91.9    264 308     BMP:   Recent Labs     02/11/22  0425 02/12/22  0435 02/13/22  0510    141 139   K 5.4* 5.9* 5.6*   CL 99 100 99   CO2 18* 18* 19*   PHOS 9.9* 11.9* 8.9*   BUN 76* 93* 69*   CREATININE 7.3* 8.9* 7.0*     LIVER PROFILE:   No results for input(s): AST, ALT, LIPASE, BILIDIR, BILITOT, ALKPHOS in the last 72 hours. Invalid input(s): AMYLASE,  ALB    Microbiology:  1/22 [facility] MSSA  1/22 COVID-19 and influenza negative  1/23/22 Blood cx: NGTD  1/23 tracheal aspirate MSSA  1/24 Wound cx: MSSA  1/24 BC MSSA  1/29/22 BAL pending  1/30/2022 blood sent  1/31/2022 left hand Enterococcus and staph aureus  2/1/2022 bone MSSA  2/5/2022 surgical hand culture E. Faecalis    Echo 1/25/22: EF 35%, global HK, reduced RV function    CXR 2/13/2022   Perihilar opacities in the left lung worse than right are redemonstrated. May be developing a pneumatocele in the right mid chest.       Endotracheal tube and nasogastric tube are acceptable       1/31/22 EEG:This EEG is abnormal. The generalized diffuse slowing is suggestive of severe diffuse encephalopathy. There is no evidence of epileptiform discharges, focal, or lateralizing abnormalities.       ASSESSMENT:  · Acute hypoxemic respiratory failure -recurrent an dhas been intubated 3 times   · Increased tracheal secretions and failure of planned extubation 2/5/22, suspect new VAP   · Septic shock    · Cardiopulmonary arrest x 2 1/23/2022, required CPR, TTM - rewarmed 8 pm 1/25/22  · Acute kidney failure  · MSSA bacteremia  · L foot abscess drained 1/24/2022, MSSA; MRI with large fluid collection and changes c/w osteomyelitis, s/p debridement by Dr. Kitty Gould on 2/1/22  · L hand burn with deep tissue injury & abscess, s/p debridement on 2/5/22  · Paroxysmal AFIB/flutter with RVR  · Cardiomyopathy EF 35% on Echo 1/24/22  · DM2  · Anemia with multiple transfusions with no obvious ongoing bleeding source     PLAN:  Mechanical ventilation as per my orders. The ventilator was adjusted by me at the bedside for unstable, life threatening respiratory failure. IV Propofol for sedation, target RASS -2, with daily spontaneous awakening trial.  Fentanyl PRN pain, gtt as needed  Head of bed 30 degrees or higher at all times  Daily spontaneous breathing trial once PEEP less than 8, FiO2 less than 55%. Dilt gtt. May need Amio  Bronch for pulmonary toilet and BAL  Chest CT when stable  · ABX  Per IM and infectious disease  · Nephrology following for HD  · TF   · Hydralazine and labatelol prn  · subcutaneous heparin  · Due to at least single organ failure and risk of rapid deterioration, I spent 31 minutes of Critical care time reviewing labs/films, examining patient, collaborating with other physicians. This does not include time performing critical procedures.

## 2022-02-13 NOTE — PROCEDURES
See History and Physical or progress note for additional findings. Pertinent changes recorded below if present. Pre/post procedure diagnosis: Fever    Allergies and medications have been reviewed    HENT: Airway patent and reviewed. ETT in place. Cardiovascular: Normal rate, regular rhythm, normal heart sounds. Pulmonary/Chest: No wheezes. No rhonchi. No rales. Abdominal: Soft. Bowel sounds are normal. No distension. ASA: Class 4 - A patient with an incapacitating systemic disease that is a constant threat to life  Level of Sedation Plan: Continue ICU sedation    Post Procedure Plan   Continue ICU care.   ______________________     The risks and benefits as well as alternatives to the procedure have been discussed with the POA. The  POA understands and agrees to proceed. Signed Consent in chart. PROCEDURE:  BRONCHOSCOPY      The risks and benefits as well as alternatives to the procedure have been discussed with the patient or POA. The patient or POA understands and agrees to proceed. Consent signed. DESCRIPTION OF PROCEDURE: A time out was taken. ICU sedation continued. The scope was passed with ease via the ETT. A complete airway inspection was performed. Normal anatomy. No endobronchial lesion was identified. Mucosa appeared normal. There were thick yellow stringy secretions. Therapeutic aspiration completed. Washings were obtained throughout the airways. A Bronchoalveolar lavage was obtained from the RML with good return. The patient tolerated the procedure well. EBL none. Recovery will be per endoscopy protocol. Will discharge home or return to same level of care per recovery protocol.     FOLLOW UP:  Cultures

## 2022-02-14 PROBLEM — E43 SEVERE PROTEIN-CALORIE MALNUTRITION (HCC): Status: ACTIVE | Noted: 2022-01-01

## 2022-02-14 NOTE — PROGRESS NOTES
Nephrology Progress Note   [facility]      This patient is a 42year old male whom we are following for RODRICK, HD dependent. Subjective:  he was transferred to the ICU on 2/12/22 with worsening respiratory distress requiring intubation. HD on 2/13 with 300 mL net UF, post weight 108.8 kg, patient received IV albumin x3 and 1 unit of packed red blood cell    ROS: No fever or chills. Social: Family at bedside. Vitals:  /69   Pulse 85   Temp 98.6 °F (37 °C) (Bladder)   Resp 24   Ht 6' 1\" (1.854 m)   Wt 239 lb 14 oz (108.8 kg)   SpO2 99%   BMI 31.65 kg/m²   I/O last 3 completed shifts: In: 4317.5 [I.V.:2020.8; IV Piggyback:1396.7]  Out: 1615 [Urine:215; Stool:200]  No intake/output data recorded.     Physical Exam:  Gen: Intubated  Cardiovascular:  S1, S2 without m/r/g trace lower extremity edema  Respiratory: Decreased breath sounds  Abdomen:  soft, nt, nd,   Neuro/Psy: Sedated  Access: R IJ VasCath      Medications:   dilTIAZem  30 mg Oral 4 times per day    cefepime  1,000 mg IntraVENous Q24H    linezolid  600 mg IntraVENous Q12H    b complex-C-folic acid  1 capsule Oral Daily    carvedilol  12.5 mg Oral BID     epoetin angeles-epbx  5,000 Units IntraVENous Once per day on Tue Thu Sat    insulin glargine  25 Units SubCUTAneous BID    insulin lispro  0-18 Units SubCUTAneous Q4H    sodium chloride flush  5-40 mL IntraVENous 2 times per day    povidone-iodine   Topical Daily    pantoprazole  40 mg IntraVENous Daily    heparin (porcine)  5,000 Units SubCUTAneous 3 times per day    Venelex   Topical BID    sodium chloride flush  5-40 mL IntraVENous 2 times per day         Labs:  Recent Labs     02/12/22  2300 02/13/22  0510 02/14/22  0534   WBC 10.1 11.4* 12.8*   HGB 7.9* 7.3* 6.5*   HCT 23.6* 22.5* 18.9*   MCV 92.8 91.9 89.3    308 240     Recent Labs     02/12/22  0435 02/13/22  0510 02/14/22  0534    139 136   K 5.9* 5.6* 3.9    99 95*   CO2 18* 19* 23 GLUCOSE 103* 171* 176*   PHOS 11.9* 8.9* 6.4*   BUN 93* 69* 52*   CREATININE 8.9* 7.0* 4.2*   LABGLOM 7* 9* 15*   GFRAA 8* 10* 19*            Assessment/      RODRICK likely related to prerenal factors in the setting of sepsis, use of diuretics and losartan contributing to multifactorial ATN. Janeann Akin is not suggestive of staph associated glomerulonephritis.  Patient did not respond to IV fluids and developed acute pulmonary edema and renal replacement therapy initiated on 1/23/22        on iHD TThS.  Seems to be making more urine, non-oliguric.     -Acute pulmonary edema, improving with dialysis.     -S/P Cardio respiratory arrest       -Hyperkalemia     -Sepsis with MSSA bacteremia with left foot abscess s/p drainage, osteomyelitis, left hand I&D     -Anemia - prn prbc's     -Diabetes, uncontrolled     -Hypertension    -Hyperphosphatemia     Plan/     -HD on Tuesday  -Retacrit 5K units qHD.  -renal dose medications   -avoid nephrotoxins
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
## 2022-02-14 NOTE — FLOWSHEET NOTE
02/14/22 1113   Negative Pressure Wound Therapy Foot Left;Dorsal   Placement Date/Time: 02/02/22 1100   Pre-existing: No  Inserted by: Shayan HUNTLEY  Location: Foot  Wound Location Orientation: Left;Dorsal   Wound Type Surgical   Unit Type Vac Ulta with Veraflo   Dressing Type Black foam   Number of pieces used 1   Cycle Continuous   Target Pressure (mmHg) 125   Intensity 1   Irrigation Solution Sodium chloride 0.9%   Instillation Volume  14 mL   Soak Time  3 minutes   Vac Frequency 2 hours   Canister changed? No   Dressing Status Changed   Dressing Changed Changed/New   Drainage Amount Other (Comment)   Drainage Description Serous; Yellow   Dressing Change Due 02/16/22   Wound Assessment Granulation tissue; Red   Shanita-wound Assessment Intact   left hand:  6x4. 6x0.5cm, 4th metacarpel tunnel=2.5cm, 5th metacarpal tunnel=3.2cm, 5% black eschar on 4th finger, 95% red moist granulation tissue. Minimal purulence noted in tunnels per dr Elisa Mena. Wound cleansed with NSS, patted dry. Dressings applied per ortho-adaptic, dry gauze and roll gauze with tape. Recommend tunnels being gently packed. left foot:  3.7x5. 9x1.7cm, exposed bone and viable moist tendons. Surrounding skin intact. Post bedside debridement with 20% yellow loose slough, 80% red, granulation tissue, tendon and bone. Left dorsal foot:  Wound cleansed with NSS, lidocaine ointment placed in wound for Dr Elisa Mena. Yellow nonviable tissue sharply debrided at bedside per Dr Elisa Mena. Minimal bleeding noted. Surrounding skin prepped using barrier wipe and vac drape. One black foam used in wound, good seal obtained at 125mmhg. Continue current Veraflo settings. Left posterior head:  4.5x2.5x0.1cm, 25% black, dry eschar, 75% pink moist tissue. Difficult to assess with matted hair. Using triad to soften tissue and hair. Triad applied. Unstageable pressure injury    Sacrum:  16x9x0.1cm, 80% brown nonviable tissue, 20% pink at edges. Unstageable pressure injury. No soi. Discontinue Betadine, apply Triad ointment twice daily and prn. No foam dressing, leave BALDEV    Pt seen with Dr Sera Hazel. All wounds visualized. Next Vac change on Wednesday.

## 2022-02-14 NOTE — PROGRESS NOTES
Speech Language Pathology  SLP Attempt and D/C Note        Name: Spring Olvera  : 1975  Medical Diagnosis: Hyperglycemia [R73.9]  RODRICK (acute kidney injury) ([facility] [address] [N17.9]  Acute renal failure, unspecified acute renal failure type ([facility] [address] [N17.9]  Syncope, unspecified syncope type [R55]    Reviewing chart for determination of safety with PO for dysphagia f/u. Noting change in status with pt transferred to ICU and re-intubated. Will sign off for now. Please re-consult with orders for re-assessment once medically appropriate. No charges filed.  Thank you,    Fritz Castro M.A., [address] [facility]  Speech-Language Pathologist  Phone: [phone]

## 2022-02-14 NOTE — CARE COORDINATION
INTERDISCIPLINARY PLAN OF CARE CONFERENCE    Date/Time: 2/14/2022 10:54 AM  Completed by:  ROSALIA Alford Case Management      Patient Name:  Chantell Strauss  YOB: 1975  Admitting Diagnosis: Hyperglycemia [R73.9]  RODRICK (acute kidney injury) ([facility] [address] [N17.9]  Acute renal failure, unspecified acute renal failure type ([facility] [address] [N17.9]  Syncope, unspecified syncope type [R55]     Admit Date/Time:  1/22/2022  1:32 PM    Chart reviewed. Interdisciplinary team contacted or reviewed plan related to patient progress and discharge plans. Disciplines included Case Management, Nursing, and Dietitian. Current Status:Ongoing. Vent. HD  PT/OT recommendation for discharge plan of care: SNF; will need new orders when appropriate     Expected D/C Disposition:  Skilled nursing facility    Discharge Plan Comments: Chart review completed. Pt transferred back to ICU 2/13/22. Completed Interdisciplinary rounds with ICU staff. Pt is now a Tuesday, Thursday, Saturday schedule for HD per RN. Called and spoke with Clayton Mendes at [facility] who was updated that pt is back on a vent. Clayton Mendes states [address] [facility]) is currently full and needs updated on when pt is closer to discharge so she can arrange outpatient HD if needed. Spoke with Everlean Lennox at [facility] stating no open beds at this time and will call if something changes; she is aware pt currently in ICU and on a Vent. CM will continue to follow and assist. Please notify CM if needs or concerns arise.     Home O2 in place on admit: No

## 2022-02-14 NOTE — PLAN OF CARE
Nutrition Problem #1: Inadequate oral intake  Intervention: Food and/or Nutrient Delivery: Continue NPO,Start Tube Feeding  Nutritional Goals: patient will tolerate Nepro at goal rate of 40 ml/hr x 20 hours without GI distress, without s/s of aspiration, and without additional lab/fluid disturbances
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
## 2022-02-14 NOTE — PROGRESS NOTES
02/13/22 1908   Vent Information   Vent Mode AC/VC   Vt Ordered 430 mL   Rate Set 28 bmp   Peak Flow 80 L/min   FiO2  35 %   SpO2 98 %   Sensitivity 3   PEEP/CPAP 5   Vent Patient Data   Peak Inspiratory Pressure 23 cmH2O   Mean Airway Pressure 9.8 cmH20   Rate Measured 28 br/min   Vt Exhaled 449 mL   Minute Volume 12.5 Liters   I:E Ratio 1:2.70   Plateau Pressure 15 [id number]   Static Compliance 45 mL/cmH2O   Dynamic Compliance 25 mL/cmH2O   Cough/Sputum   Sputum How Obtained Suctioned;Endotracheal   Cough Productive   Sputum Amount Small   Sputum Color Red;Creamy   Tenacity Thick;Mucous plugs
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
## 2022-02-14 NOTE — PROGRESS NOTES
Rounding completed with Dr. Taras Romero and multidisciplinary team. POC, labs, lines and assessment reviewed.    - RR to 24  - Consult dietitian for TF orders  - D/C Cardizem drip  - start PO Cardizem instead

## 2022-02-15 NOTE — PROGRESS NOTES
Physical Therapy / Occupational Therapy    PT/OT eval was completed on 2/11 while pt was on PCU. Per chart review he was reintubated on 2/13. Will need new orders if/when medically appropriate.      Iraj Avalos, PT, DPT  Barrie Hernandez OTR/L

## 2022-02-15 NOTE — PROGRESS NOTES
02/15/22 0240   Vent Information   Vent Type 840   Vent Mode AC/VC   Vt Ordered 430 mL   Rate Set 24 bmp   Peak Flow 80 L/min   Pressure Support 0 cmH20   FiO2  30 %   SpO2 100 %   SpO2/FiO2 ratio 333.33   Sensitivity 3   PEEP/CPAP 5   I Time/ I Time % 0 s   Humidification Source Heated wire   Humidification Temp 37   Humidification Temp Measured 37   Circuit Condensation Drained   Vent Patient Data   High Peep/I Pressure 0   Peak Inspiratory Pressure 23 cmH2O   Mean Airway Pressure 9.5 cmH20   Rate Measured 24 br/min   Vt Exhaled 499 mL   Minute Volume 11.1 Liters   I:E Ratio 1:3.30   Plateau Pressure 16 [id number]   Cough/Sputum   Sputum How Obtained Suctioned;Endotracheal   Cough Non-productive   Spontaneous Breathing Trial (SBT) RT Doc   Pulse 82   Breath Sounds   Right Upper Lobe Diminished   Right Middle Lobe Diminished   Right Lower Lobe Diminished   Left Upper Lobe Diminished   Left Lower Lobe Diminished   Additional Respiratory  Assessments   Resp 24   Position Semi-Springer's   Oral Care Completed? Yes   Oral Care Mouth suctioned   Subglottic Suction Done? Yes   Alarm Settings   High Pressure Alarm 40 cmH2O   Low Minute Volume Alarm 4 L/min   Apnea (secs) 20 secs   High Respiratory Rate 50 br/min   Low Exhaled Vt  300 mL   ETT (adult)   Placement Date/Time: 02/13/22 0515   Preoxygenation: Yes  Technique: Rapid sequence;Direct laryngoscopy  Type: Cuffed  Tube Size: 8 mm  Laryngoscope: GlideScope  Location: Oral  Insertion attempts: 1  Placement Verified By[de-identified] Auscultation; Chest X-ray;C. ..    Secured at 26 cm   Measured From [address] [place] [address],[address] By Commercial tube wheeler   Site Condition Dry

## 2022-02-15 NOTE — PROGRESS NOTES
Assessment complete, patient intubated, on propofol, eyes awake, patient calm, nods appropriately, unable to move hands or legs on command. Lung sounds clear and diminished, small secretions per suction, VS stable, SR on monitor, repositioned, venelex ointment placed on coccyx, will continue to monitor.

## 2022-02-15 NOTE — FLOWSHEET NOTE
Treatment time: 3.5 hours  Net UF: 2700 ml     Pre weight: 105.6 kg   Post weight: 103 kg  EDW: TBD kg     Access used: [facility]  Access function: Poor, positional with -350 ml/min     Medications or blood products given: Heparin for catheter dwells     Regular outpatient schedule: TTS     Summary of response to treatment:      02/15/22 1242   Vital Signs   /83   Temp 99.2 °F (37.3 °C)   Pulse 101   Resp 21   SpO2 100 %   Weight 227 lb 1.2 oz (103 kg)   Percent Weight Change -2.46   Pain Assessment   Pain Assessment 0-10   Pain Level 0   Post-Hemodialysis Assessment   Post-Treatment Procedures Blood returned;Catheter capped, clamped and heparinized x 2 ports   Machine Disinfection Process Acid/Vinegar Clean;Heat Disinfect; Exterior Machine Disinfection   Rinseback Volume (ml) 400 ml   Total Liters Processed (l/min) 55.9 l/min   Dialyzer Clearance Moderately streaked   Duration of Treatment (minutes) 204 minutes   Heparin amount administered during treatment (units) 0 units   Hemodialysis Intake (ml) 400 ml   Hemodialysis Output (ml) 3100 ml   NET Removed (ml) 2700 ml   Tolerated Treatment Good   Copy of dialysis treatment record placed in chart, to be scanned into EMR.  Report provided to Ashwin Trevino RN at bedside.

## 2022-02-15 NOTE — PROGRESS NOTES
Shift assessment completed, see flow sheet. RASS 0. Following commands.   - Propofol infusing at 35 mcg/kg/min. Intubated and sedated on AC # 8 ETT, at 26 LL. 24 / 430 / 30 %/ +5. SpO2 99%. Respirations are easy, even, and unlabored. Bilateral lung sounds diminshed. VSS  OG in place at 70, with TF at 40 mL/hr. PICC/VC, WNL. All lines and monitoring devices in place. Munoz is patent and secured. Bilateral soft wrist restraints in place for patient safety. Bed in lowest position with wheels locked.

## 2022-02-15 NOTE — PROGRESS NOTES
Rounding completed with Dr. Claudeen Curl and multidisciplinary team. POC, labs, lines and assessment reviewed. - patient placed back on full support with the ventilator. Plan to turn sedation off and SBT after HD is complete at 1200.   - ABG after 2 hours of SBT  - Extubate?

## 2022-02-15 NOTE — PROGRESS NOTES
02/14/22 1908   Vent Information   Vent Mode AC/VC   Vt Ordered 430 mL   Rate Set 24 bmp   Peak Flow 80 L/min   FiO2  35 %   SpO2 100 %   Sensitivity 3   PEEP/CPAP 5   Vent Patient Data   Peak Inspiratory Pressure 20 cmH2O   Mean Airway Pressure 9.3 cmH20   Rate Measured 24 br/min   Vt Exhaled 405 mL   Minute Volume 10.7 Liters   I:E Ratio 1:3.30   Plateau Pressure 14 [id number]   Static Compliance 45 mL/cmH2O   Dynamic Compliance 27 mL/cmH2O   Cough/Sputum   Sputum How Obtained Suctioned;Endotracheal   Cough Non-productive   Sputum Amount None

## 2022-02-15 NOTE — PLAN OF CARE
Problem: Falls - Risk of:  Goal: Will remain free from falls  Description: Will remain free from falls  Outcome: Ongoing  Goal: Absence of physical injury  Description: Absence of physical injury  Outcome: Ongoing     Problem: Skin Integrity:  Goal: Will show no infection signs and symptoms  Description: Will show no infection signs and symptoms  Outcome: Ongoing  Goal: Absence of new skin breakdown  Description: Absence of new skin breakdown  Outcome: Ongoing     Problem: Confusion - Acute:  Goal: Absence of continued neurological deterioration signs and symptoms  Description: Absence of continued neurological deterioration signs and symptoms  Outcome: Ongoing  Goal: Mental status will be restored to baseline  Description: Mental status will be restored to baseline  Outcome: Ongoing     Problem: Discharge Planning:  Goal: Ability to perform activities of daily living will improve  Description: Ability to perform activities of daily living will improve  Outcome: Ongoing  Goal: Participates in care planning  Description: Participates in care planning  Outcome: Ongoing     Problem: Injury - Risk of, Physical Injury:  Goal: Will remain free from falls  Description: Will remain free from falls  Outcome: Ongoing  Goal: Absence of physical injury  Description: Absence of physical injury  Outcome: Ongoing     Problem: Mood - Altered:  Goal: Mood stable  Description: Mood stable  Outcome: Ongoing  Goal: Absence of abusive behavior  Description: Absence of abusive behavior  Outcome: Ongoing  Goal: Verbalizations of feeling emotionally comfortable while being cared for will increase  Description: Verbalizations of feeling emotionally comfortable while being cared for will increase  Outcome: Ongoing     Problem: Psychomotor Activity - Altered:  Goal: Absence of psychomotor disturbance signs and symptoms  Description: Absence of psychomotor disturbance signs and symptoms  Outcome: Ongoing     Problem: Sensory Perception - Impaired:  Goal: Demonstrations of improved sensory functioning will increase  Description: Demonstrations of improved sensory functioning will increase  Outcome: Ongoing  Goal: Decrease in sensory misperception frequency  Description: Decrease in sensory misperception frequency  Outcome: Ongoing  Goal: Able to refrain from responding to false sensory perceptions  Description: Able to refrain from responding to false sensory perceptions  Outcome: Ongoing  Goal: Demonstrates accurate environmental perceptions  Description: Demonstrates accurate environmental perceptions  Outcome: Ongoing  Goal: Able to distinguish between reality-based and nonreality-based thinking  Description: Able to distinguish between reality-based and nonreality-based thinking  Outcome: Ongoing  Goal: Able to interrupt nonreality-based thinking  Description: Able to interrupt nonreality-based thinking  Outcome: Ongoing     Problem: Sleep Pattern Disturbance:  Goal: Appears well-rested  Description: Appears well-rested  Outcome: Ongoing     Problem: Nutrition  Goal: Optimal nutrition therapy  2/14/2022 2253 by Alem Kunz RN  Outcome: Ongoing  2/14/2022 1426 by Camille Lewis RD, LD  Outcome: Not Met This Shift  Goal: Understanding of nutritional guidelines  2/14/2022 2253 by Alem Kunz RN  Outcome: Ongoing  2/14/2022 1426 by Camille Lewis RD, LD  Outcome: Not Met This Shift     Problem: Coping:  Goal: Ability to cope will improve  Description: Ability to cope will improve  Outcome: Ongoing

## 2022-02-15 NOTE — PROGRESS NOTES
Nephrology Progress Note   [facility]      This patient is a 42year old male whom we are following for RODRICK, HD dependent. Subjective:  he was transferred to the ICU on 2/12/22 with worsening respiratory distress requiring intubation. Seen and examined at HD on 2/15 w UFG increased to 3l as tolerated    HD on 2/13 with 300 mL net UF, post weight 108.8 kg, patient received IV albumin x3 and 1 unit of packed red blood cell    ROS: No fever or chills. Social:No Family at bedside. Vitals:  /78   Pulse 92   Temp 98.8 °F (37.1 °C) (Bladder)   Resp 20   Ht 6' 1\" (1.854 m)   Wt 232 lb 12.9 oz (105.6 kg)   SpO2 99%   BMI 30.71 kg/m²   I/O last 3 completed shifts: In: 2547.6 [I.V.:1739.8; NG/GT:474; IV Piggyback:333.8]  Out: 230 [Urine:230]  No intake/output data recorded.     Physical Exam:  Gen: Intubated  Cardiovascular:  S1, S2 without m/r/g trace lower extremity edema  Respiratory: Decreased breath sounds  Abdomen:  soft, nt, nd,   Neuro/Psy: Sedated  Access: R IJ VasCath      Medications:   ampicillin-sulbactam  3,000 mg IntraVENous Q12H    dilTIAZem  30 mg Oral 4 times per day    b complex-C-folic acid  1 capsule Oral Daily    carvedilol  12.5 mg Oral BID WC    epoetin [place]-epbx  5,000 Units IntraVENous Once per day on Tue Thu Sat    insulin glargine  25 Units SubCUTAneous BID    insulin lispro  0-18 Units SubCUTAneous Q4H    sodium chloride flush  5-40 mL IntraVENous 2 times per day    povidone-iodine   Topical Daily    pantoprazole  40 mg IntraVENous Daily    heparin (porcine)  5,000 Units SubCUTAneous 3 times per day    Venelex   Topical BID    sodium chloride flush  5-40 mL IntraVENous 2 times per day         Labs:  Recent Labs     02/13/22  0510 02/14/22  0534 02/15/22  0410   WBC 11.4* 12.8* 9.9   HGB 7.3* 6.5* 6.9*   HCT 22.5* 18.9* 20.0*   MCV 91.9 89.3 89.5    240 214     Recent Labs     02/13/22  0510 02/14/22  0534 02/15/22  0410    136 133*   K 5.6* 3.9 3.8   CL 99 95* 93*   CO2 19* 23 22   GLUCOSE 171* 176* 156*   PHOS 8.9* 6.4* 7.6*   BUN 69* 52* 71*   CREATININE 7.0* 4.2* 5.6*   LABGLOM 9* 15* 11*   GFRAA 10* 19* 13*            Assessment/      RODRICK likely related to prerenal factors in the setting of sepsis, use of diuretics and losartan contributing to multifactorial ATN. Mattie Juana is not suggestive of staph associated glomerulonephritis.  Patient did not respond to IV fluids and developed acute pulmonary edema and renal replacement therapy initiated on 1/23/22        on iHD TThS.  Seems to be making more urine, non-oliguric.     -Acute pulmonary edema, improving with dialysis.     -S/P Cardio respiratory arrest       -Hyperkalemia     -Sepsis with MSSA bacteremia with left foot abscess s/p drainage, osteomyelitis, left hand I&D     -Anemia - prn prbc's     -Diabetes, uncontrolled     -Hypertension    -Hyperphosphatemia     Plan/     -HD on TTS schedule  -Retacrit 5K units qHD.  -renal dose medications   -avoid nephrotoxins
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
## 2022-02-15 NOTE — PROGRESS NOTES
Patients propofol taken down to 35, he is alert and awake, nodding apprpriately on this. His TF are paused. Gogo, RT will be flipping him to SBT at this time.
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
## 2022-02-15 NOTE — PROGRESS NOTES
Patient bathed and repositioned, RASS -1, suctioned per ETT and orally, moderate secretions, lung sounds diminished, no s/s distress, will continue to monitor.

## 2022-02-15 NOTE — CARE COORDINATION
Received voicemail from Hannibal at [facility] ([employer]) stating she can assist with discharge plans and can be reached at [phone]

## 2022-02-16 NOTE — CONSULTS
Gastroenterology Consult Note    Patient:   Alexa Ley   :    1975   Facility:   [facility]  Referring/PCP: Kimmie Hernandez MD  Date:     2022  Consultant:   Nikkie Duff PA-C      Chief Complaint   Patient presents with    Altered Mental Status     Pt with brief LOC PTA        History of Present illness     42year old male with a history of HTN, poorly controlled T2DM, A fib, and nonischemic cardiomyopathy admitted with MSSA bacteremia c/b MSSA foot abscess, RODRICK, and acute respiratory failure s/p extubation x3. Hospitalization c/b anemia s/p 8U PRBCs. GI was consulted for evaluation of blood in stool. Nurse reports melena per [facility] today. He is occult positive. Patient is fatigued upon bedside evaluation, so history obtained upon chart review, discussion with nurse, and patient interview. He has occasional GERD at home which resolves with OTC Cee Altamont prn. He takes Ibuprofen 2-3x per week for headache relief. He denies nausea, vomiting, dysphagia, heartburn, abdominal pain, cramping, and bloating. He has never had an EGD or colonoscopy. He denies family history of colon cancer or colon polyps.        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    Chronic osteomyelitis of left foot ([address] 10/27/2020    Closed displaced fracture of distal phalanx of right little finger 2021    Clostridium difficile infection 2016    Diabetic ulcer of left forefoot associated with type 2 diabetes mellitus, with necrosis of bone ([address] 2019    Diabetic ulcer of right great toe associated with type 2 diabetes mellitus, with necrosis of bone ([address] 2019    Failed soft 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 toe amputation site 10/26/2020    History of hyperbaric oxygen therapy 10/28/2020    [facility]- [facility] - compromised flap    Migraine     Possible perforated tympanic membrane     as a result of recurrent otitis    Post-op hematoma of left foot 2020    Recurrent otitis media     Septic shock (HCC)     Syncope     Tear of medial meniscus of left knee     Tobacco use     Toe osteomyelitis, left ([address] 2020     Past Surgical History:   Procedure Laterality Date    ARM SURGERY Left 2022    LEFT HAND INCISION AND DRAINAGE performed by Karlie Burleson MD at [facility] Left 2022    ULCER DEBRIDEMENT LEFT FOOT performed by Elma Garcia DPM at [address] IR NONTUNNELED VASCULAR CATHETER  2022    IR NONTUNNELED VASCULAR CATHETER 2022 [facility]    KNEE ARTHROSCOPY Left [id number]    LEFT KNEE ARTHROSCOPY , SYNOVECTOMY       OTHER SURGICAL HISTORY Left 2020    PARTIAL LEFT FOOT AMPUTATION    TOE AMPUTATION Right 2019    PARTIAL RIGHT FOOT AMPUTATION performed by Elma Garcia DPM at [address] TOE AMPUTATION Left 2020    PARTIAL LEFT FOOT AMPUTATION performed by Elma Garcia DPM at [address] TOE AMPUTATION Left 10/22/2020    PARTIAL LEFT FOOT AMPUTATION WITH GRAFT APPLICATION performed by Elma Garcia DPM at [facility] EXTRACTION         Social:   Social History     Tobacco Use    Smoking status: Former Smoker     Packs/day: 0.50     Years: 2.00     Pack years: 1.00     Quit date: 2005     Years since quittin.5    Smokeless tobacco: Former User     Quit date: 2005    Tobacco comment: SMOKED OCCASIONALLY UNTIL 8 YEARS AGO   Substance Use Topics    Alcohol use: Yes     Comment: RARELY     Family:   Family History   Problem Relation Age of Onset    Diabetes Mother     High Blood Pressure Mother     High Cholesterol Mother     Diabetes Father     High Blood Pressure Father     High Cholesterol Father     Stroke Father     High Blood Pressure Sister     High Blood Pressure Maternal Uncle     High Cholesterol Maternal Uncle     High Blood Pressure Maternal Grandmother      No current facility-administered medications on file prior to encounter. Current Outpatient Medications on File Prior to Encounter   Medication Sig Dispense Refill    Insulin Degludec (TRESIBA FLEXTOUCH) 200 UNIT/ML SOPN INJECT 76 UNITS INTO THE SKIN NIGHTLY 9 mL 0    losartan (COZAAR) 50 MG tablet TAKE 1 TABLET BY MOUTH DAILY 90 tablet 1    tiZANidine (ZANAFLEX) 4 MG tablet TAKE 2 TABLETS BY MOUTH NIGHTLY 180 tablet 0    carvedilol (COREG) 12.5 MG tablet TAKE 1 TABLET BY MOUTH 2 TIMES DAILY (WITH MEALS) 180 tablet 0    rosuvastatin (CRESTOR) 20 MG tablet TAKE 1 TABLET BY MOUTH NIGHTLY 90 tablet 0    traZODone (DESYREL) 50 MG tablet TAKE 1 TABLET BY MOUTH NIGHTLY 90 tablet 0    furosemide (LASIX) 20 MG tablet TAKE 1 TABLET BY MOUTH DAILY 90 tablet 0    gabapentin (NEURONTIN) 400 MG capsule TAKE 2 CAPSULES BY MOUTH 3 TIMES DAILY FOR 90 DAYS. 540 capsule 0    insulin lispro (HUMALOG KWIKPEN) 200 UNIT/ML SOPN pen Inject 25 Units into the skin 3 times daily (before meals) 5 pen 2    glimepiride (AMARYL) 4 MG tablet TAKE 1 TABLET BY MOUTH EVERY MORNING (BEFORE BREAKFAST). 90 tablet 0    PARoxetine (PAXIL) 10 MG tablet TAKE 1 TABLET BY MOUTH EVERY MORNING 90 tablet 0    blood glucose test strips (ONETOUCH ULTRA) strip USE TO TEST BLOOD GLUCOSE DAILY. DX:E11.9 100 strip 0    Blood Glucose Monitoring Suppl (CONTOUR NEXT EZ) w/Device KIT Use to test glucose daily. DX:E11.9 1 kit 0    Insulin Pen Needle 32G X 6 MM MISC Use with insulin daily . DX:E11.9 100 each 3    blood glucose monitor strips Use to test blood sugar daily.   DX:E11.9 100 strip 3    oxymetazoline (AFRIN) 0.05 % nasal spray 2 sprays by Nasal route daily Indications: prior to HBO      loratadine (CLARITIN) 10 MG tablet Take 10 mg by mouth nightly as needed       sildenafil (VIAGRA) 100 MG tablet TAKE 1 TABLET BY MOUTH AS NEEDED FOR ERECTILE DYSFUNCTION 15 tablet 0      Infusions:    sodium chloride      sodium chloride      sodium chloride 5 mL/hr at 02/09/22 1630    sodium chloride      sodium chloride Stopped (02/07/22 0806)    dextrose       PRN Medications: sodium chloride, sodium chloride, sodium chloride flush, sodium chloride, heparin (porcine), sodium chloride, albumin human, heparin (porcine), sodium chloride flush, sodium chloride, acetaminophen **OR** acetaminophen, glucose, glucagon (rDNA), dextrose, dextrose bolus (hypoglycemia) **OR** dextrose bolus (hypoglycemia)  Allergies: Allergies   Allergen Reactions    Januvia [Sitagliptin] Nausea Only     Has taken metformin without side effects in the past.  Nausea with Janumet in the past.     Metformin And Related      GI Upset    Vancomycin      Pt had red face, swelling itching of eyelids, sore throat after receiving vancmomyin and cefepime. I think this was a histamine releasing reaction from vancomycin most likely.  The cefepime was switched to Zosyn and patient had no reaction to Zosyn    Mustard [employer] Isothiocyanate] Swelling and Rash       ROS:   Constitutional: negative for chills, fevers and sweats  Eyes: negative for cataracts, icterus and redness  Ears, nose, mouth, throat, and face: negative for epistaxis, hearing loss and sore throat  Respiratory: negative for cough, hemoptysis and sputum  Cardiovascular: negative for chest pain, dyspnea and lower extremity edema  Gastrointestinal: as per HPI  Genitourinary:negative for dysuria, frequency and hematuria  Neurological: negative for coordination problems, dizziness and gait problems  Behavioral/Psych: negative for anxiety, depression and mood swings    Physical Exam   /79   Pulse 107   Temp 100.5 °F (38.1 °C) (Bladder)   Resp 25   Ht 6' 1\" (1.854 m)   Wt 233 lb 11 oz (106 kg)   SpO2 100%   BMI 30.83 kg/m²       General appearance: alert, appears stated age, cooperative, fatigued, slowed mentation and syndromic appearance - chronically ill appearing  Head: Normocephalic, without obvious abnormality, atraumatic  Eyes: conjunctivae/corneas clear. PERRL, EOM's intact. Fundi benign. Neck: no adenopathy and supple, symmetrical, trachea midline  Lungs: clear to auscultation bilaterally  Heart: regular rate and rhythm, S1, S2 normal, no murmur, click, rub or gallop  Abdomen: soft, non-tender; bowel sounds normal; no masses,  no organomegaly  Extremities: extremities normal, atraumatic, no cyanosis or edema    Lab and Imaging Review   Labs:  CBC:   Recent Labs     02/14/22  0534 02/15/22  0410 02/16/22  0534   WBC 12.8* 9.9 11.0   HGB 6.5* 6.9* 7.1*   HCT 18.9* 20.0* 20.8*   MCV 89.3 89.5 89.6    214 248     BMP:   Recent Labs     02/14/22  0534 02/15/22  0410 02/16/22  0534    133* 132*   K 3.9 3.8 3.6   CL 95* 93* 94*   CO2 23 22 21   PHOS 6.4* 7.6* 5.0*   BUN 52* 71* 50*   CREATININE 4.2* 5.6* 4.3*     LIVER PROFILE: No results for input(s): AST, ALT, LIPASE, PROT, BILIDIR, BILITOT, ALKPHOS in the last 72 hours. Invalid input(s): AMYLASE,  ALB  PT/INR: No results for input(s): INR in the last 72 hours. Invalid input(s): PT      IMAGING:  XR CHEST 1 VIEW - 2/16/2022  Impression   Bilateral airspace disease greater left parahilar and infrahilar primary   concern is pneumonia.       Rounded thick-walled lucent lesion in the right mid lung possible focal   abscess.  As above, other considerations include a pulmonary bleb or   pneumatocele with inflammatory margins and unlikely necrotic neoplasm. .       CT scan with contrast recommended. Attending Supervising [de-identified] Attestation Statement  The patient is a 42 y.o. male. I have performed a history and physical examination of the patient. I discussed the case with my physician assistant Marquette Snellen PA-C    I reviewed the patient's Past Medical History, Past Surgical History, Medications, and Allergies.      Physical Exam:  Vitals:    02/16/22 1736 02/16/22 1745 02/16/22 1755 02/16/22 1800   BP: (!) 136/34 139/83  130/81   Pulse: 105 109 108 106   Resp: 23 26 25 27   Temp: 100.1 °F (37.8 °C)      TempSrc:       SpO2:  100% 100% 100%   Weight:       Height:           Physical Examination: General appearance - ill-appearing  Mental status - lethargic  Eyes - sclera anicteric  Neck - supple, no significant adenopathy  Chest - clear to auscultation, no wheezes, rales or rhonchi, symmetric air entry  Heart - normal rate, regular rhythm, normal S1, S2, no murmurs, rubs, clicks or gallops  Abdomen - soft, nontender, nondistended, no masses or organomegaly  Extremities - no pedal edema noted        Assessment:     42year old male with a history of HTN, poorly controlled T2DM, A fib, and nonischemic cardiomyopathy admitted with MSSA bacteremia c/b MSSA foot abscess, RODRICK, and acute respiratory failure s/p extubation x3. Hospitalization c/b anemia, hemoccult positive, and melena s/p 8U PRBCs. Plan:   1. Continue supportive care  2. Monitor Hgb  3. Observe for signs of bleeding  4. Monitor and document output  5. Transfuse as needed if Hgb <7.0  6. Continue Pantoprazole 40 mg BID  7. Advance diet as tolerated per SLP recommendations  8. NPO after midnight  9. Will schedule EGD for tomorrow  10. Wound care, nephrology, pulmonology and ID following  11. HD per nephrology   12. Encourage PT/OT  13. Will follow       Ann Borden PA-C  2:55 PM 2/16/2022                      42year old male with a history of HTN, DM, A fib, and nonischemic cardiomyopathy admitted with septic shock secondary to MSSA bacteremia and L foot abscess complicated by acute respiratory failure and RODRICK. Hospitalization c/b cardiorespiratory arrest, RODRICK requiring HD and anemia. Acute on chronic anemia s/p 8U PRBCs now with heme+stool likely secondary to PUD vs esophagitis    Continue supportive care. PPI BID. Monitor Hgb. Hold lovenox.  EGD tomorrow.       Clari Otto MD          [phone]  [phone]

## 2022-02-16 NOTE — PROGRESS NOTES
Saw Dr. Dolly Dodge on the unit and reported the low hgb results. He asked for 1 unit of PRBCs to be given.

## 2022-02-16 NOTE — PROGRESS NOTES
Pulmonary & Critical Care Medicine ICU Progress Note    CC: Septic shock, MSSA bacteremia, left foot abscess    Events of Last 24 hours:    Propofoloff  Cardizem drip off - now NSR   Vasopressin off   PEEP 5   FiO2 30%      Vascular lines:    RUE PICC, Temp IJ vas cath    MV:   1/23/22 - 2/5/22; extubated and re-intubated due to unable to clear secretions/hypoxia on 2/5/22 after less than 12 hours  2/5-2/9/22  reintubated 2/13 fr svt and resp distress with secretions        Intake/Output Summary (Last 24 hours) at 2/16/2022 0721  Last data filed at 2/16/2022 0500  Gross per 24 hour   Intake 2547.67 ml   Output 3315 ml   Net -767.33 ml       /78   Pulse 107   Temp 100.2 °F (37.9 °C) (Bladder)   Resp 29   Ht 6' 1\" (1.854 m)   Wt 233 lb 11 oz (106 kg)   SpO2 99%   BMI 30.83 kg/m²  AC 24/430  General: ill appearing. Intubated sedated. Eyes: PERRL. No sclera icterus. No conjunctival injection. ENT: No discharge. Pharynx clear. ET tube in place  Neck: Trachea midline. Normal thyroid. Resp: No accessory muscle use. Few crackles. No wheezing. No rhonchi. CV: Regular rate. Regular rhythm. No mumur or rub. No edema. GI: Non-tender. Non-distended. No masses. Skin: Warm and dry. No nodule on exposed extremities. No rash on exposed extremities. Lymph: No cervical LAD. No supraclavicular LAD. M/S: No cyanosis. No joint deformity. No clubbing. Neuro: Alert and awake. Followed commands,. Good cough.    Psych: Noncommunicative unable to obtain          Scheduled Meds:   ampicillin-sulbactam  3,000 mg IntraVENous Q12H    dilTIAZem  30 mg Oral 4 times per day    b complex-C-folic acid  1 capsule Oral Daily    carvedilol  12.5 mg Oral BID WC    epoetin [place]-epbx  5,000 Units IntraVENous Once per day on Tue Thu Sat    insulin glargine  25 Units SubCUTAneous BID    insulin lispro  0-18 Units SubCUTAneous Q4H    sodium chloride flush  5-40 mL IntraVENous 2 times per day    povidone-iodine   Topical Daily    pantoprazole  40 mg IntraVENous Daily    heparin (porcine)  5,000 Units SubCUTAneous 3 times per day    Venelex   Topical BID    sodium chloride flush  5-40 mL IntraVENous 2 times per day       Data:  CBC:   Recent Labs     02/14/22  0534 02/15/22  0410 02/16/22  0534   WBC 12.8* 9.9 11.0   HGB 6.5* 6.9* 7.1*   HCT 18.9* 20.0* 20.8*   MCV 89.3 89.5 89.6    214 248     BMP:   Recent Labs     02/14/22  0534 02/15/22  0410 02/16/22  0534    133* 132*   K 3.9 3.8 3.6   CL 95* 93* 94*   CO2 23 22 21   PHOS 6.4* 7.6* 5.0*   BUN 52* 71* 50*   CREATININE 4.2* 5.6* 4.3*     LIVER PROFILE:   No results for input(s): AST, ALT, LIPASE, BILIDIR, BILITOT, ALKPHOS in the last 72 hours. Invalid input(s): AMYLASE,  ALB    Microbiology:  1/22 [facility] MSSA  1/22 COVID-19 and influenza negative  1/23/22 Blood cx: NGTD  1/23 tracheal aspirate MSSA  1/24 Wound cx: MSSA  1/24 BC MSSA  1/29/22 BAL pending  1/30/2022 blood sent  1/31/2022 left hand Enterococcus and staph aureus  2/1/2022 bone MSSA  2/5/2022 surgical hand culture E.  Faecalis  2/13 BAL NGTD   2/13 BC NGTD       Imaging:   CXR 2/16/2022  images reviewed by me and showed:   Satisfactory ETT position  Satisfactory PICC line position   Low lung volumes  LLL ASD   R midlung cavitary lesion       Echo 1/25/22:   EF 35%, global HK, reduced RV function    ASSESSMENT:  · Acute hypoxemic respiratory failure -recurrent and has been intubated 3 times  · Probable VAP LLL   · Abnormal CXR with R mid lung cavitary lesion -suspecting likely septic emboli  · Septic shock    · Cardiopulmonary arrest x 2 1/23/2022, required CPR, TTM - rewarmed 8 pm 1/25/22  · Acute kidney failure  · MSSA bacteremia  · L foot abscess drained 1/24/2022, MSSA; MRI with large fluid collection and changes c/w osteomyelitis, s/p debridement by Dr. Iliana Casiano on 2/1/22  · L hand burn with deep tissue injury & abscess, s/p debridement on 2/5/22  · Paroxysmal AFIB/flutter with RVR  · Cardiomyopathy EF 35% on Echo 1/24/22  · DM2  · Anemia with multiple transfusions with no obvious ongoing bleeding source     PLAN:  Mechanical ventilation as per my orders. The ventilator was adjusted by me at the bedside for unstable, life threatening respiratory failure  IV Propofol for sedation, target RASS -2, with daily spontaneous awakening trial.   Fentanyl PRN pain, gtt as needed  Follow TG   Head of bed 30 degrees or higher at all times  Spontaneous breathing trial for possible extubation today post dialysis  PO Cardizem 30 QID   Unasyn per ID  Chest CT when stable  Nephrology following for HD  TF at goal   Wound vac in L foot   Hold Hydralazine and labatelol   SQ heparin and PPI   I discussed care plan with wife at the bedside and multiple good questions were answered. Due to at least single organ failure and risk of rapid deterioration, I spent 31 minutes of Critical care time reviewing labs/films, examining patient, collaborating with other physicians. This does not include time performing critical procedures.

## 2022-02-16 NOTE — PROGRESS NOTES
Patient extubated at this time to 4L NC. Dr. Demetris Grijalva, Yousif Celis and this RN at bedside.

## 2022-02-16 NOTE — PROGRESS NOTES
Inpatient Occupational Therapy  Re-Evaluation and Treatment    Unit: ICU  Date:  2/16/2022  Patient Name:    General Gama  Admitting diagnosis:  Hyperglycemia [R73.9]  RODRICK (acute kidney injury) [facility]) [N17.9]  Acute renal failure, unspecified acute renal failure type ([facility] [N17.9]  Syncope, unspecified syncope type [R55]  Admit Date:  1/22/2022  Precautions/Restrictions/WB Status/ Lines/ Wounds/ Oxygen: fall risk, IV, bed/chair alarm, rea catheter , telemetry, telesitter and continuous pulse ox, rectal tube, wound vac left foot. Dressing over burn on left hand. Difficulty verbalizing     Treatment Time:  15:20-15:55  Treatment Number: 1     Billable Treatment Time: 25 minutes   Total Treatment Time:   35  minutes    Patient Goals for Therapy: unable to state a goal      Discharge Recommendations: SNF  DME needs for discharge: defer to facility       Therapy recommendations for staff: Max A of 2 for bed mobility. B UE and LE AAROM exercises. Wife educated on providing AAROM and maintaining L hand digit extension.      History of Present Illness: Per H&P Dr. Laurence Henley 1/23: \"Patient is a 70-year-old white male with a prior history of diabetes mellitus type 2 poorly controlled, CHF, history of diabetic ulcer with amputation of the right great, history of tobacco abuse, recent burn to the left hand-Velban been feeling well since Wednesday.  Initially thought he had [address] test is negative.  Patient is sleeping more and had decreased appetite.  Wife finally called 911.  Work-up in the ED showed acute kidney injury.  His creatinine was normal in October 2021. Nirmal Calvo is admitted to the hospital and started on IV fluids.  This morning his creatinine is worse.  He is made about 300 cc of urine.  His mentation is worse.  He is not requiring oxygen.  The time of admission he did not require oxygen.  He is presently on 5 L and saturating 90%.  His respiratory rate is also got worse.  His mentation is about the same. [facility] can answer some questions. [facility] denies any chest pain. \"    [facility] called 01/23/22. Patient not breathing with no pulse. CPR and ACLS protocol were followed after which patient gained pulse and blood pressure back. In route to ICU he lost his pulse again for which CPR was restarted. Patient gained his pulse back a second time and started on epinephrine drip.      Intubated 1/23/22 - 2/5/22; extubated and re-intubated due to unable to clear secretions/hypoxia on 2/5/22 after less than 12 hours. Pt extubated on 2/9/22.     S/p L hand I&D 2/5    Home Health S4 Level Recommendation:  NA  AM-PAC Score:        Preadmission Environment    Pt. Lives with spouse (Spouse works)  Home environment:    two story home (farm house, bedroom upstairs),can stay on the main level   Steps to enter first floor: 2 steps to enter   Steps to second floor: Full flight of 12-13  Bathroom: tub/shower unit and standard height commode  Equipment owned: none     Preadmission Status:  Pt. Able to drive: Yes  Pt Fully independent with ADLs: Yes  Pt. Required assistance from family for: Independent PTA  Pt. independent for transfers and gait and walked with No Device  History of falls No    Cognition    A&Oriented. Patient able to mouth name. Nodding head yes/no to questions. Patient unable to vocalize at this time and demonstrates significant fatigue. Will continue to assess cognition. Subjective  Patient lying supine in bed with wife present   Pt agreeable to this OT eval & tx. Upper Extremity ROM:    R UE AAROM grossly WFLs. Pain with all joints and in all planes. L digit extension slightly impaired due to pain and burn on dorsal aspect of hand. Upper Extremity Strength:    BUE strength impaired but not formally assessed w/ MMT   Pt able to slightly grasp with B hands but decreased digit extension. Active wrist flex/ext. Muscle contractions felt with B elbow and shoulder ROM.       Upper Extremity Sensation Impaired- B UE neuropathy at baseline. Upper Extremity Proprioception:  Impaired    Coordination and Tone  Impaired    Balance  Functional Sitting Balance:  NT  Functional Standing Balance:NT    Bed mobility:    Supine to sit:   Not Tested  Sit to supine:   Not Tested  Rolling:    Not Tested  Scooting in sitting:  Not Tested  Scooting to head of bed:   Not Tested    Bridging:   Not Tested    Transfers:    Sit to stand:  Not Tested  Stand to sit:  Not Tested  Bed to chair:   Not Tested  Standard toilet: Not Tested  Bed to [facility]:  Not Tested    Dressing:      UE:   Total A   LE:    Total A     Bathing:    UE:  Not Tested  LE:  Not Tested    Eating:   N/A, NPO    Toileting:  Not Tested     Grooming: Total Assist wash face    Activity Tolerance   Pt completed therapy session with Pain noted with grimace during all AAROM in B UEs and L LE    Positioning Needs: In bed, call light and needs in reach. Alarm Set    Exercise / Activities Initiated:   Hand flex/ext:  x5  Wrist flex/ext:  x5  Shoulder flex/ext:  x5   Elbow flex/ext x5    Patient/Family Education:   Role of OT    Assessment of Deficits: Pt seen for Occupational therapy evaluation in acute care setting. Pt demonstrated decreased Activity tolerance, ADLs, IADLs, Balance , Bed mobility, ROM, Safety Awareness, Strength, Transfers and Cognition. Pt functioning below baseline and will likely benefit from skilled occupational therapy services to maximize safety and independence. Goal(s) : To be met in 3 Visits:  1). Rolling in bed for toileting Max A of 2    To be met in 5 Visits:  1). Supine to/from Sit:  Max A of 2   2). Sitting at EOB for 5 minutes during grooming task  3). Grooming with  Max A  2). Pt to demonstrate AROM UE bilateral UEs x5 reps     Rehabilitation Potential:  Fair for goals listed above.     Strengths for achieving goals include: PLOF  Barriers to achieving goals include:  Complexity of condition, Weakness and Cognition     Plan: To be seen 3-5 x/wk while in acute care setting for therapeutic exercises, bed mobility, transfers, dressing, bathing, family/patient education, ADL/IADL retraining, energy conservation training.        SHARA MaeR/L #[id number]        If patient discharges from this facility prior to next visit, this note will serve as the Discharge Summary

## 2022-02-16 NOTE — PROGRESS NOTES
02/15/22 1924   Vent Information   Vent Type 840   Vent Mode AC/VC   Vt Ordered 430 mL   Rate Set 24 bmp   Peak Flow 80 L/min   Pressure Support 0 cmH20   FiO2  30 %   SpO2 99 %   SpO2/FiO2 ratio 330   Sensitivity 3   PEEP/CPAP 5   I Time/ I Time % 0 s   Humidification Source Heated wire   Humidification Temp 37   Humidification Temp Measured 37   Circuit Condensation Drained   Vent Patient Data   High Peep/I Pressure 0   Peak Inspiratory Pressure 23 cmH2O   Mean Airway Pressure 9.5 cmH20   Rate Measured 25 br/min   Vt Exhaled 467 mL   Minute Volume 11.8 Liters   I:E Ratio 1:3.10   Plateau Pressure 17 XYR34   Static Compliance 39 mL/cmH2O   Dynamic Compliance 26 mL/cmH2O   Cough/Sputum   Sputum How Obtained Endotracheal;Suctioned   Cough Productive   Sputum Amount Small   Sputum Color Creamy   Tenacity Thick   Spontaneous Breathing Trial (SBT) RT Doc   Pulse 104   Breath Sounds   Right Upper Lobe Diminished   Right Middle Lobe Diminished   Right Lower Lobe Diminished   Left Upper Lobe Diminished   Left Lower Lobe Diminished   Additional Respiratory  Assessments   Resp 25   Position Semi-Springer's   Alarm Settings   High Pressure Alarm 40 cmH2O   Low Minute Volume Alarm 4 L/min   High Respiratory Rate 50 br/min   Patient Observation   Observations ETT SIZE 8.0, SECURED AT 26 LIP LINE. AMBU BAG AT HEAD OF BED. WATER GOOD. ETT (adult)   Placement Date/Time: 02/13/22 0515   Preoxygenation: Yes  Technique: Rapid sequence;Direct laryngoscopy  Type: Cuffed  Tube Size: 8 mm  Laryngoscope: GlideScope  Location: Oral  Insertion attempts: 1  Placement Verified By[de-identified] Auscultation; Chest X-ray;C. ..    Secured at 26 cm   Measured From [address] [place] [address],[address] By Commercial tube wheeler   Site Condition Dry        02/15/22 1924   Vent Information   Vent Type 840   Vent Mode AC/VC   Vt Ordered 430 mL   Rate Set 24 bmp   Peak Flow 80 L/min   Pressure Support 0 cmH20   FiO2  30 %   SpO2 99 %   SpO2/FiO2 ratio 330 Sensitivity 3   PEEP/CPAP 5   I Time/ I Time % 0 s   Humidification Source Heated wire   Humidification Temp 37   Humidification Temp Measured 37   Circuit Condensation Drained   Vent Patient Data   High Peep/I Pressure 0   Peak Inspiratory Pressure 23 cmH2O   Mean Airway Pressure 9.5 cmH20   Rate Measured 25 br/min   Vt Exhaled 467 mL   Minute Volume 11.8 Liters   I:E Ratio 1:3.10   Plateau Pressure 17 ZHI90   Static Compliance 39 mL/cmH2O   Dynamic Compliance 26 mL/cmH2O   Cough/Sputum   Sputum How Obtained Endotracheal;Suctioned   Cough Productive   Sputum Amount Small   Sputum Color Creamy   Tenacity Thick   Spontaneous Breathing Trial (SBT) RT Doc   Pulse 104   Breath Sounds   Right Upper Lobe Diminished   Right Middle Lobe Diminished   Right Lower Lobe Diminished   Left Upper Lobe Diminished   Left Lower Lobe Diminished   Additional Respiratory  Assessments   Resp 25   Position Semi-Springer's   Alarm Settings   High Pressure Alarm 40 cmH2O   Low Minute Volume Alarm 4 L/min   High Respiratory Rate 50 br/min   Patient Observation   Observations ETT SIZE 8.0, SECURED AT 26 LIP LINE. AMBU BAG AT HEAD OF BED. WATER GOOD. ETT (adult)   Placement Date/Time: 02/13/22 0515   Preoxygenation: Yes  Technique: Rapid sequence;Direct laryngoscopy  Type: Cuffed  Tube Size: 8 mm  Laryngoscope: GlideScope  Location: Oral  Insertion attempts: 1  Placement Verified By[de-identified] Auscultation; Chest X-ray;C. ..    Secured at 26 cm   Measured From [address] [place] [address],[address] By Commercial tube wheeler   Site Condition Dry        02/15/22 1924   Vent Information   Vent Type 840   Vent Mode AC/VC   Vt Ordered 430 mL   Rate Set 24 bmp   Peak Flow 80 L/min   Pressure Support 0 cmH20   FiO2  30 %   SpO2 99 %   SpO2/FiO2 ratio 330   Sensitivity 3   PEEP/CPAP 5   I Time/ I Time % 0 s   Humidification Source Heated wire   Humidification Temp 37   Humidification Temp Measured 37   Circuit Condensation Drained   Vent Patient Data   High Peep/I Pressure 0   Peak Inspiratory Pressure 23 cmH2O   Mean Airway Pressure 9.5 cmH20   Rate Measured 25 br/min   Vt Exhaled 467 mL   Minute Volume 11.8 Liters   I:E Ratio 1:3.10   Plateau Pressure 17 LIL92   Static Compliance 39 mL/cmH2O   Dynamic Compliance 26 mL/cmH2O   Cough/Sputum   Sputum How Obtained Endotracheal;Suctioned   Cough Productive   Sputum Amount Small   Sputum Color Creamy   Tenacity Thick   Spontaneous Breathing Trial (SBT) RT Doc   Pulse 104   Breath Sounds   Right Upper Lobe Diminished   Right Middle Lobe Diminished   Right Lower Lobe Diminished   Left Upper Lobe Diminished   Left Lower Lobe Diminished   Additional Respiratory  Assessments   Resp 25   Position Semi-Springer's   Alarm Settings   High Pressure Alarm 40 cmH2O   Low Minute Volume Alarm 4 L/min   High Respiratory Rate 50 br/min   Patient Observation   Observations ETT SIZE 8.0, SECURED AT 26 LIP LINE. AMBU BAG AT HEAD OF BED. WATER GOOD. ETT (adult)   Placement Date/Time: 02/13/22 0515   Preoxygenation: Yes  Technique: Rapid sequence;Direct laryngoscopy  Type: Cuffed  Tube Size: 8 mm  Laryngoscope: GlideScope  Location: Oral  Insertion attempts: 1  Placement Verified By[de-identified] Auscultation; Chest X-ray;C. ..    Secured at 26 cm   Measured From [address] [place] [address],[address] By Commercial tube wheeler   Site Condition Dry        02/15/22 1924   Vent Information   Vent Type 840   Vent Mode AC/VC   Vt Ordered 430 mL   Rate Set 24 bmp   Peak Flow 80 L/min   Pressure Support 0 cmH20   FiO2  30 %   SpO2 99 %   SpO2/FiO2 ratio 330   Sensitivity 3   PEEP/CPAP 5   I Time/ I Time % 0 s   Humidification Source Heated wire   Humidification Temp 37   Humidification Temp Measured 37   Circuit Condensation Drained   Vent Patient Data   High Peep/I Pressure 0   Peak Inspiratory Pressure 23 cmH2O   Mean Airway Pressure 9.5 cmH20   Rate Measured 25 br/min   Vt Exhaled 467 mL   Minute Volume 11.8 Liters   I:E Ratio 1:3.10   Plateau Pressure 17 IXH34 Static Compliance 39 mL/cmH2O   Dynamic Compliance 26 mL/cmH2O   Cough/Sputum   Sputum How Obtained Endotracheal;Suctioned   Cough Productive   Sputum Amount Small   Sputum Color Creamy   Tenacity Thick   Spontaneous Breathing Trial (SBT) RT Doc   Pulse 104   Breath Sounds   Right Upper Lobe Diminished   Right Middle Lobe Diminished   Right Lower Lobe Diminished   Left Upper Lobe Diminished   Left Lower Lobe Diminished   Additional Respiratory  Assessments   Resp 25   Position Semi-Springer's   Alarm Settings   High Pressure Alarm 40 cmH2O   Low Minute Volume Alarm 4 L/min   High Respiratory Rate 50 br/min   Patient Observation   Observations ETT SIZE 8.0, SECURED AT 26 LIP LINE. AMBU BAG AT HEAD OF BED. WATER GOOD. ETT (adult)   Placement Date/Time: 02/13/22 0515   Preoxygenation: Yes  Technique: Rapid sequence;Direct laryngoscopy  Type: Cuffed  Tube Size: 8 mm  Laryngoscope: GlideScope  Location: Oral  Insertion attempts: 1  Placement Verified By[de-identified] Auscultation; Chest X-ray;C. ..    Secured at 26 cm   Measured From [address] [place] [address],[address] By Commercial tube wheeler   Site Condition Dry        02/15/22 1924   Vent Information   Vent Type 840   Vent Mode AC/VC   Vt Ordered 430 mL   Rate Set 24 bmp   Peak Flow 80 L/min   Pressure Support 0 cmH20   FiO2  30 %   SpO2 99 %   SpO2/FiO2 ratio 330   Sensitivity 3   PEEP/CPAP 5   I Time/ I Time % 0 s   Humidification Source Heated wire   Humidification Temp 37   Humidification Temp Measured 37   Circuit Condensation Drained   Vent Patient Data   High Peep/I Pressure 0   Peak Inspiratory Pressure 23 cmH2O   Mean Airway Pressure 9.5 cmH20   Rate Measured 25 br/min   Vt Exhaled 467 mL   Minute Volume 11.8 Liters   I:E Ratio 1:3.10   Plateau Pressure 17 RJC06   Static Compliance 39 mL/cmH2O   Dynamic Compliance 26 mL/cmH2O   Cough/Sputum   Sputum How Obtained Endotracheal;Suctioned   Cough Productive   Sputum Amount Small   Sputum Color Creamy   Tenacity Thick Spontaneous Breathing Trial (SBT) RT Doc   Pulse 104   Breath Sounds   Right Upper Lobe Diminished   Right Middle Lobe Diminished   Right Lower Lobe Diminished   Left Upper Lobe Diminished   Left Lower Lobe Diminished   Additional Respiratory  Assessments   Resp 25   Position Semi-Springer's   Alarm Settings   High Pressure Alarm 40 cmH2O   Low Minute Volume Alarm 4 L/min   High Respiratory Rate 50 br/min   Patient Observation   Observations ETT SIZE 8.0, SECURED AT 26 LIP LINE. AMBU BAG AT HEAD OF BED. WATER GOOD. ETT (adult)   Placement Date/Time: 02/13/22 0515   Preoxygenation: Yes  Technique: Rapid sequence;Direct laryngoscopy  Type: Cuffed  Tube Size: 8 mm  Laryngoscope: GlideScope  Location: Oral  Insertion attempts: 1  Placement Verified By[de-identified] Auscultation; Chest X-ray;C. ..    Secured at 26 cm   Measured From [address] [place] [address],[address] By Commercial tube wheeler   Site Condition Dry        02/15/22 1924   Vent Information   Vent Type 840   Vent Mode AC/VC   Vt Ordered 430 mL   Rate Set 24 bmp   Peak Flow 80 L/min   Pressure Support 0 cmH20   FiO2  30 %   SpO2 99 %   SpO2/FiO2 ratio 330   Sensitivity 3   PEEP/CPAP 5   I Time/ I Time % 0 s   Humidification Source Heated wire   Humidification Temp 37   Humidification Temp Measured 37   Circuit Condensation Drained   Vent Patient Data   High Peep/I Pressure 0   Peak Inspiratory Pressure 23 cmH2O   Mean Airway Pressure 9.5 cmH20   Rate Measured 25 br/min   Vt Exhaled 467 mL   Minute Volume 11.8 Liters   I:E Ratio 1:3.10   Plateau Pressure 17 ZPB47   Static Compliance 39 mL/cmH2O   Dynamic Compliance 26 mL/cmH2O   Cough/Sputum   Sputum How Obtained Endotracheal;Suctioned   Cough Productive   Sputum Amount Small   Sputum Color Creamy   Tenacity Thick   Spontaneous Breathing Trial (SBT) RT Doc   Pulse 104   Breath Sounds   Right Upper Lobe Diminished   Right Middle Lobe Diminished   Right Lower Lobe Diminished   Left Upper Lobe Diminished   Left Lower Lobe Diminished   Additional Respiratory  Assessments   Resp 25   Position Semi-Springer's   Alarm Settings   High Pressure Alarm 40 cmH2O   Low Minute Volume Alarm 4 L/min   High Respiratory Rate 50 br/min   Patient Observation   Observations ETT SIZE 8.0, SECURED AT 26 LIP LINE. AMBU BAG AT HEAD OF BED. WATER GOOD. ETT (adult)   Placement Date/Time: 02/13/22 0515   Preoxygenation: Yes  Technique: Rapid sequence;Direct laryngoscopy  Type: Cuffed  Tube Size: 8 mm  Laryngoscope: GlideScope  Location: Oral  Insertion attempts: 1  Placement Verified By[de-identified] Auscultation; Chest X-ray;C. ..    Secured at 26 cm   Measured From [address] [place] [address],[address] By Commercial tube wheeler   Site Condition Dry        02/15/22 1924   Vent Information   Vent Type 840   Vent Mode AC/VC   Vt Ordered 430 mL   Rate Set 24 bmp   Peak Flow 80 L/min   Pressure Support 0 cmH20   FiO2  30 %   SpO2 99 %   SpO2/FiO2 ratio 330   Sensitivity 3   PEEP/CPAP 5   I Time/ I Time % 0 s   Humidification Source Heated wire   Humidification Temp 37   Humidification Temp Measured 37   Circuit Condensation Drained   Vent Patient Data   High Peep/I Pressure 0   Peak Inspiratory Pressure 23 cmH2O   Mean Airway Pressure 9.5 cmH20   Rate Measured 25 br/min   Vt Exhaled 467 mL   Minute Volume 11.8 Liters   I:E Ratio 1:3.10   Plateau Pressure 17 DOA55   Static Compliance 39 mL/cmH2O   Dynamic Compliance 26 mL/cmH2O   Cough/Sputum   Sputum How Obtained Endotracheal;Suctioned   Cough Productive   Sputum Amount Small   Sputum Color Creamy   Tenacity Thick   Spontaneous Breathing Trial (SBT) RT Doc   Pulse 104   Breath Sounds   Right Upper Lobe Diminished   Right Middle Lobe Diminished   Right Lower Lobe Diminished   Left Upper Lobe Diminished   Left Lower Lobe Diminished   Additional Respiratory  Assessments   Resp 25   Position Semi-Springer's   Alarm Settings   High Pressure Alarm 40 cmH2O   Low Minute Volume Alarm 4 L/min   High Respiratory Rate 50 br/min Patient Observation   Observations ETT SIZE 8.0, SECURED AT 26 LIP LINE. AMBU BAG AT HEAD OF BED. WATER GOOD. ETT (adult)   Placement Date/Time: 02/13/22 0515   Preoxygenation: Yes  Technique: Rapid sequence;Direct laryngoscopy  Type: Cuffed  Tube Size: 8 mm  Laryngoscope: GlideScope  Location: Oral  Insertion attempts: 1  Placement Verified By[de-identified] Auscultation; Chest X-ray;C. .. Secured at 26 cm   Measured From [address] [place] [address],[address] By Commercial tube wheeler   Site Condition Dry        02/15/22 1924   Vent Information   Vent Type 840   Vent Mode AC/VC   Vt Ordered 430 mL   Rate Set 24 bmp   Peak Flow 80 L/min   Pressure Support 0 cmH20   FiO2  30 %   SpO2 99 %   SpO2/FiO2 ratio 330   Sensitivity 3   PEEP/CPAP 5   I Time/ I Time % 0 s   Humidification Source Heated wire   Humidification Temp 37   Humidification Temp Measured 37   Circuit Condensation Drained   Vent Patient Data   High Peep/I Pressure 0   Peak Inspiratory Pressure 23 cmH2O   Mean Airway Pressure 9.5 cmH20   Rate Measured 25 br/min   Vt Exhaled 467 mL   Minute Volume 11.8 Liters   I:E Ratio 1:3.10   Plateau Pressure 17 [id number]   Static Compliance 39 mL/cmH2O   Dynamic Compliance 26 mL/cmH2O   Cough/Sputum   Sputum How Obtained Endotracheal;Suctioned   Cough Productive   Sputum Amount Small   Sputum Color Creamy   Tenacity Thick   Spontaneous Breathing Trial (SBT) RT Doc   Pulse 104   Breath Sounds   Right Upper Lobe Diminished   Right Middle Lobe Diminished   Right Lower Lobe Diminished   Left Upper Lobe Diminished   Left Lower Lobe Diminished   Additional Respiratory  Assessments   Resp 25   Position Semi-Springer's   Alarm Settings   High Pressure Alarm 40 cmH2O   Low Minute Volume Alarm 4 L/min   High Respiratory Rate 50 br/min   Patient Observation   Observations ETT SIZE 8.0, SECURED AT 26 LIP LINE. AMBU BAG AT HEAD OF BED. WATER GOOD.     ETT (adult)   Placement Date/Time: 02/13/22 0515   Preoxygenation: Yes  Technique: Rapid sequence;Direct laryngoscopy  Type: Cuffed  Tube Size: 8 mm  Laryngoscope: GlideScope  Location: Oral  Insertion attempts: 1  Placement Verified By[de-identified] Auscultation; Chest X-ray;C. .. Secured at 26 cm   Measured From [address] [place] [address],[address] By Commercial tube wheeler   Site Condition Dry        02/15/22 1924   Vent Information   Vent Type 840   Vent Mode AC/VC   Vt Ordered 430 mL   Rate Set 24 bmp   Peak Flow 80 L/min   Pressure Support 0 cmH20   FiO2  30 %   SpO2 99 %   SpO2/FiO2 ratio 330   Sensitivity 3   PEEP/CPAP 5   I Time/ I Time % 0 s   Humidification Source Heated wire   Humidification Temp 37   Humidification Temp Measured 37   Circuit Condensation Drained   Vent Patient Data   High Peep/I Pressure 0   Peak Inspiratory Pressure 23 cmH2O   Mean Airway Pressure 9.5 cmH20   Rate Measured 25 br/min   Vt Exhaled 467 mL   Minute Volume 11.8 Liters   I:E Ratio 1:3.10   Plateau Pressure 17 DNV45   Static Compliance 39 mL/cmH2O   Dynamic Compliance 26 mL/cmH2O   Cough/Sputum   Sputum How Obtained Endotracheal;Suctioned   Cough Productive   Sputum Amount Small   Sputum Color Creamy   Tenacity Thick   Spontaneous Breathing Trial (SBT) RT Doc   Pulse 104   Breath Sounds   Right Upper Lobe Diminished   Right Middle Lobe Diminished   Right Lower Lobe Diminished   Left Upper Lobe Diminished   Left Lower Lobe Diminished   Additional Respiratory  Assessments   Resp 25   Position Semi-Springer's   Alarm Settings   High Pressure Alarm 40 cmH2O   Low Minute Volume Alarm 4 L/min   High Respiratory Rate 50 br/min   Patient Observation   Observations ETT SIZE 8.0, SECURED AT 26 LIP LINE. AMBU BAG AT HEAD OF BED. WATER GOOD. ETT (adult)   Placement Date/Time: 02/13/22 0515   Preoxygenation: Yes  Technique: Rapid sequence;Direct laryngoscopy  Type: Cuffed  Tube Size: 8 mm  Laryngoscope: GlideScope  Location: Oral  Insertion attempts: 1  Placement Verified By[de-identified] Auscultation; Chest X-ray;C. ..    Secured at 26 cm Measured From [facility]   ET Placement Center   Secured By Commercial tube wheeler   Site Condition Dry        02/15/22 1924   Vent Information   Vent Type 840   Vent Mode AC/VC   Vt Ordered 430 mL   Rate Set 24 bmp   Peak Flow 80 L/min   Pressure Support 0 cmH20   FiO2  30 %   SpO2 99 %   SpO2/FiO2 ratio 330   Sensitivity 3   PEEP/CPAP 5   I Time/ I Time % 0 s   Humidification Source Heated wire   Humidification Temp 37   Humidification Temp Measured 37   Circuit Condensation Drained   Vent Patient Data   High Peep/I Pressure 0   Peak Inspiratory Pressure 23 cmH2O   Mean Airway Pressure 9.5 cmH20   Rate Measured 25 br/min   Vt Exhaled 467 mL   Minute Volume 11.8 Liters   I:E Ratio 1:3.10   Plateau Pressure 17 BCL10   Static Compliance 39 mL/cmH2O   Dynamic Compliance 26 mL/cmH2O   Cough/Sputum   Sputum How Obtained Endotracheal;Suctioned   Cough Productive   Sputum Amount Small   Sputum Color Creamy   Tenacity Thick   Spontaneous Breathing Trial (SBT) RT Doc   Pulse 104   Breath Sounds   Right Upper Lobe Diminished   Right Middle Lobe Diminished   Right Lower Lobe Diminished   Left Upper Lobe Diminished   Left Lower Lobe Diminished   Additional Respiratory  Assessments   Resp 25   Position Semi-Springer's   Alarm Settings   High Pressure Alarm 40 cmH2O   Low Minute Volume Alarm 4 L/min   High Respiratory Rate 50 br/min   Patient Observation   Observations ETT SIZE 8.0, SECURED AT 26 LIP LINE. AMBU BAG AT HEAD OF BED. WATER GOOD. ETT (adult)   Placement Date/Time: 02/13/22 0515   Preoxygenation: Yes  Technique: Rapid sequence;Direct laryngoscopy  Type: Cuffed  Tube Size: 8 mm  Laryngoscope: GlideScope  Location: Oral  Insertion attempts: 1  Placement Verified By[de-identified] Auscultation; Chest X-ray;C. ..    Secured at 26 cm   Measured From [address] [place] [address],[address] By Commercial tube wheeler   Site Condition Dry        02/15/22 1924   Vent Information   Vent Type 840   Vent Mode AC/VC   Vt Ordered 430 mL   Rate Set 24 bmp   Peak Flow 80 L/min   Pressure Support 0 cmH20   FiO2  30 %   SpO2 99 %   SpO2/FiO2 ratio 330   Sensitivity 3   PEEP/CPAP 5   I Time/ I Time % 0 s   Humidification Source Heated wire   Humidification Temp 37   Humidification Temp Measured 37   Circuit Condensation Drained   Vent Patient Data   High Peep/I Pressure 0   Peak Inspiratory Pressure 23 cmH2O   Mean Airway Pressure 9.5 cmH20   Rate Measured 25 br/min   Vt Exhaled 467 mL   Minute Volume 11.8 Liters   I:E Ratio 1:3.10   Plateau Pressure 17 GHD45   Static Compliance 39 mL/cmH2O   Dynamic Compliance 26 mL/cmH2O   Cough/Sputum   Sputum How Obtained Endotracheal;Suctioned   Cough Productive   Sputum Amount Small   Sputum Color Creamy   Tenacity Thick   Spontaneous Breathing Trial (SBT) RT Doc   Pulse 104   Breath Sounds   Right Upper Lobe Diminished   Right Middle Lobe Diminished   Right Lower Lobe Diminished   Left Upper Lobe Diminished   Left Lower Lobe Diminished   Additional Respiratory  Assessments   Resp 25   Position Semi-Springer's   Alarm Settings   High Pressure Alarm 40 cmH2O   Low Minute Volume Alarm 4 L/min   High Respiratory Rate 50 br/min   Patient Observation   Observations ETT SIZE 8.0, SECURED AT 26 LIP LINE. AMBU BAG AT HEAD OF BED. WATER GOOD. ETT (adult)   Placement Date/Time: 02/13/22 0515   Preoxygenation: Yes  Technique: Rapid sequence;Direct laryngoscopy  Type: Cuffed  Tube Size: 8 mm  Laryngoscope: GlideScope  Location: Oral  Insertion attempts: 1  Placement Verified By[de-identified] Auscultation; Chest X-ray;C. ..    Secured at 26 cm   Measured From [address] [place] [address],[address] By Commercial tube wheeler   Site Condition Dry

## 2022-02-16 NOTE — PROGRESS NOTES
Pt placd on SBT 5/5, FiO2 30%  RSBI=69         02/16/22 0737   Vent Information   Vt Ordered 430 mL   Rate Set 0 bmp   Peak Flow 80 L/min   Pressure Support 5 cmH20   FiO2  30 %   SpO2 100 %   SpO2/FiO2 ratio 333.33   Sensitivity 2   PEEP/CPAP 5   I Time/ I Time % 0 s   Vent Patient Data   High Peep/I Pressure 0   Peak Inspiratory Pressure 12 cmH2O   Mean Airway Pressure 7.8 cmH20   Rate Measured 31 br/min   Vt Exhaled 466 mL   Minute Volume 14.4 Liters   I:E Ratio 1:1.80   Spontaneous Breathing Trial (SBT) RT Doc   Pulse 108   Additional Respiratory  Assessments   Resp 29   Alarm Settings   High Pressure Alarm 40 cmH2O   Low Minute Volume Alarm 4 L/min   High Respiratory Rate 50 br/min

## 2022-02-16 NOTE — PROGRESS NOTES
02/16/22 0342   Vent Information   Vent Type 840   Vent Mode AC/VC   Vt Ordered 430 mL   Rate Set 24 bmp   Peak Flow 80 L/min   Pressure Support 0 cmH20   FiO2  30 %   SpO2 100 %   SpO2/FiO2 ratio 333.33   Sensitivity 3   PEEP/CPAP 5   I Time/ I Time % 0 s   Humidification Source Heated wire   Humidification Temp 37   Humidification Temp Measured 37   Circuit Condensation Drained   Vent Patient Data   High Peep/I Pressure 0   Peak Inspiratory Pressure 20 cmH2O   Mean Airway Pressure 9.5 cmH20   Rate Measured 28 br/min   Vt Exhaled 423 mL   Minute Volume 12.4 Liters   I:E Ratio 1:2.60   Plateau Pressure 16 XZA18   Cough/Sputum   Sputum How Obtained Endotracheal;Suctioned   Cough Productive   Sputum Amount Small   Sputum Color Creamy   Tenacity Thick   Spontaneous Breathing Trial (SBT) RT Doc   Pulse 103   Breath Sounds   Right Upper Lobe Diminished   Right Middle Lobe Diminished   Right Lower Lobe Diminished   Left Upper Lobe Diminished   Left Lower Lobe Diminished   Additional Respiratory  Assessments   Resp 28   Position Semi-Springer's   Alarm Settings   High Pressure Alarm 40 cmH2O   Low Minute Volume Alarm 4 L/min   High Respiratory Rate 50 br/min   Patient Observation   Observations ETT SIZE 8.0, SECURED AT 26 LIP LINE. AMBU BAG AT HEAD OF BED. WATER GOOD. ETT (adult)   Placement Date/Time: 02/13/22 0515   Preoxygenation: Yes  Technique: Rapid sequence;Direct laryngoscopy  Type: Cuffed  Tube Size: 8 mm  Laryngoscope: GlideScope  Location: Oral  Insertion attempts: 1  Placement Verified By[de-identified] Auscultation; Chest X-ray;C. ..    Secured at 26 cm   Measured From Lips   ET Placement Left   Secured By Commercial tube wheeler   Site Condition Dry

## 2022-02-16 NOTE — PROGRESS NOTES
Inpatient Physical Therapy Re - Evaluation and Treatment    Unit: ICU  Date:  2/16/2022  Patient Name:    Harriett Chand  Admitting diagnosis:  Hyperglycemia [R73.9]  RODRICK (acute kidney injury) [facility]) [N17.9]  Acute renal failure, unspecified acute renal failure type ([facility] [address] [N17.9]  Syncope, unspecified syncope type [R55]  Admit Date:  1/22/2022  Precautions/Restrictions/WB Status/ Lines/ Wounds/ Oxygen: Fall risk, Bed/chair alarm, Lines -IV, Supplemental O2 (2L/min), Munoz catheter, PICC right and Wound vac present on the L foot, rectal tube, Confusion, Telemetry, Continuous pulse oximetry and multiple wounds on the sacrum, L dorsal aspect of the hand and L foot    Treatment Time: 1517 - 1554  Treatment Number:  1   Timed Code Treatment Minutes: 27 minutes  Total Treatment Minutes:  37  minutes    Patient Goals for Therapy: Unable to state goals. Discharge Recommendations: SNF  DME needs for discharge: defer to facility       Therapy recommendation for EMS Transport: requires transport by cot due to need of lift equipment for functional transfers    Therapy recommendations for staff:   Assist of 2 for bed mobility, encourage ROM in all four limbs during positioning.     History of Present Illness: Per H&P Dr. Hoang Hamilton 1/23: \"Patient is a 44-year-old white male with a prior history of diabetes mellitus type 2 poorly controlled, CHF, history of diabetic ulcer with amputation of the right great, history of tobacco abuse, recent burn to the left hand-Velban been feeling well since Wednesday.  Initially thought he had [address] test is negative.  Patient is sleeping more and had decreased appetite.  Wife finally called 911.  Work-up in the ED showed acute kidney injury.  His creatinine was normal in October 2021. Art Saravia is admitted to the hospital and started on IV fluids.  This morning his creatinine is worse.  He is made about 300 cc of urine.  His mentation is worse.  He is not requiring oxygen.  The time of admission he did not require oxygen.  He is presently on 5 L and saturating 90%.  His respiratory rate is also got worse.  His mentation is about the same. Susie Jenkins can answer some questions.  He denies any chest pain. \"  [facility] called 01/23/22. Patient not breathing with no pulse. CPR and ACLS protocol were followed after which patient gained pulse and blood pressure back. In route to ICU he lost his pulse again for which CPR was restarted. Patient gained his pulse back a second time and started on epinephrine drip.   Intubated 1/23/22 - 2/5/22; extubated and re-intubated due to unable to clear secretions/hypoxia on 2/5/22 after less than 12 hours. Pt extubated on 2/9/22. S/p L hand I&D 2/5 2/13: reintubation  2/16: Extubated     Home Health S4 Level Recommendation:  NA  AM-PAC Mobility Score    AM-PAC Inpatient Mobility Raw Score : 6     Preadmission Environment    Pt. Lives with spouse (Spouse works)  Home environment:  two story home (farm house, bedroom upstairs),can stay on the main level   Steps to enter first floor: 2 steps to enter   Steps to second floor: Full flight of 12-13  Bathroom: tub/shower unit and standard height commode  Equipment owned: none    Preadmission Status:  Pt. Able to drive: Yes  Pt Fully independent with ADLs: Yes  Pt. Required assistance from family for: Independent PTA  Pt. independent for transfers and gait and walked with No Device  History of falls No    Pain   Yes  Location: L UE and LE   Rating: moderate /10  Pain Medicine Status: RN notified    Cognition    A&O Person   Able to follow 1 step commands inconsistently    Subjective  Patient lying supine in bed with spouse present. Pt agreeable to this PT eval & tx. Upper Extremity ROM/Strength  Please see OT evaluation.       Lower Extremity ROM / Strength   AROM WFL: Yes  ROM limitations: N/A    Strength Assessment (measured on a 0-5 scale):  R LE   Quad   0   Ant Tib  0   Hamstring 0   Iliopsoas 0  L LE  Quad 0   Ant Tib  0   Hamstring 0   Iliopsoas 0    Lower Extremity Sensation    Impaired    Lower Extremity Proprioception:   Impaired    Coordination and Tone  Impaired    Balance  Sitting:  Not tested; Not Tested  Comments: unsafe to attempt due to decreased arousal state    Standing: Not tested; Not Tested  Comments: unsafe to attempt. Bed Mobility (Patient is totally dependent currently)  Supine to Sit:    Not Tested  Sit to Supine:   Not Tested  Rolling:   Not Tested  Scooting in sitting: Not Tested  Scooting in supine:  Not Tested    Transfer Training  (Patient is totally dependent currently)   Sit to stand:   Not Tested  Stand to sit:   Not Tested  Bed to Chair:   Not Tested with use of N/A    Gait gait deferred due to difficulty with transfers; pt ambulated 0 ft. Stair Training deferred, pt unsafe/ not appropriate to complete stairs at this time    Activity Tolerance   Pt completed therapy session with Pain noted with all functional mobility  BP: 133/82  SpO2: 100% on 2L/min  HR: 105 bpm    Positioning Needs   Pt in bed, alarm set, positioned in proper neutral alignment and pressure relief provided. Call light provided and all needs within reach    Exercises Initiated  all completed bilaterally unless indicated  PROM given in the form of ankle pumps x 10 reps, heel slides x 10 reps, hooklying position hold for 1 min     Other  None. Patient/Family Education   Pt educated on role of inpatient PT, POC, importance of continued activity, safety awareness, transfer techniques and calling for assist with mobility. Assessment  Pt seen for Physical Therapy evaluation in acute care setting. Pt demonstrated decreased Activity tolerance, Balance, ROM, Safety and Strength as well as decreased independence with Ambulation, Bed Mobility  and Transfers.      Recommending SNF upon discharge as patient functioning well below baseline, demonstrates good rehab potential and unable to return home due to limited or no family support, inability to negotiate stairs to enter home/bedroom/bathroom, burden of care beyond caregiver ability, home environment not conducive to patient recovery and limited safety awareness. Goals : To be met in 3 visits:  1). Independent with LE Ex x 10 reps    To be met in 6 visits:  1). Supine to/from sit: Max A   2). Patient will be able to roll on either side with Max A x 2 persons. 3). Patient will be to tolerate sitting at the EOB for 2 min  4). Patient will be able to initiate LEs movements to participate in functional mobility. Rehabilitation Potential: Fair  Strengths for achieving goals include:   PLOF   Barriers to achieving goals include:    Complexity of condition, Pain and Weakness    Plan    To be seen 2-3 x / week  while in acute care setting for therapeutic exercises, bed mobility, transfers, progressive gait training, balance training, and family/patient education. Signature: Tequila Salazar MS PT, # [id number]    If patient discharges from this facility prior to next visit, this note will serve as the Discharge Summary.

## 2022-02-16 NOTE — PROGRESS NOTES
Blood came up after verifying with [facility] from beverley Leiva and this rn double checked the blood and started the infusion. This rn stayed at bedside for the first 15 minutes after the blood reached the vein. No transfusion reaction reported.

## 2022-02-16 NOTE — PROGRESS NOTES
[facility] Infectious Disease Progress Note      Virgilio Carter     : 1975    DATE OF VISIT:  2022  DATE OF ADMISSION:  2022       Subjective:     Virgilio Carter is a 42 y.o. male whom I've been seeing for deep infections of his left hand and left foot. Since I last saw him, he's made very good progress  -- extubated earlier today, breathing pretty comfortably, also much more alert and conversant than he had been before. Able to say that he's very tired, mild shortness of breath, coughing at times, no pain, no fever or chills. Continues with some dark diarrheal stool. Hgb continues to drop at times; getting PRBCs tonight, and having a GI eval for bleeding. Mr. Nahomy Garza has a past medical history of Abscess of left foot, Acute osteomyelitis of right hallux ([address] Cellulitis of left foot, CHF (congestive heart failure) ([address] Chronic osteomyelitis of left foot ([address] Closed displaced fracture of distal phalanx of right little finger, Clostridium difficile infection, Diabetic ulcer of left forefoot associated with type 2 diabetes mellitus, with necrosis of bone ([address] Diabetic ulcer of right great toe associated with type 2 diabetes mellitus, with necrosis of bone ([address] Failed soft 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 toe amputation site, History of hyperbaric oxygen therapy, Migraine, Possible perforated tympanic membrane, Post-op hematoma of left foot, Recurrent otitis media, Septic shock ([address] Syncope, Tear of medial meniscus of left knee, Tobacco use, and Toe osteomyelitis, left ([address]     Current Facility-Administered Medications: pantoprazole (PROTONIX) injection 40 mg, 40 mg, IntraVENous, BID  0.9 % sodium chloride infusion, , IntraVENous, PRN  ampicillin-sulbactam (UNASYN) 3000 mg ivpb minibag, 3,000 mg, IntraVENous, Q12H  dilTIAZem (CARDIZEM) tablet 30 mg, 30 mg, Oral, 4 times per day  b complex-C-folic acid (NEPHROCAPS) capsule 1 mg, 1 capsule, Oral, Daily  carvedilol (COREG) tablet 12.5 mg, 12.5 mg, Oral, BID WC  epoetin angeles-epbx (RETACRIT) injection 5,000 Units, 5,000 Units, IntraVENous, Once per day on Tue Thu Sat  insulin glargine (LANTUS) injection vial 25 Units, 25 Units, SubCUTAneous, BID  insulin lispro (HUMALOG) injection vial 0-18 Units, 0-18 Units, SubCUTAneous, Q4H  sodium chloride flush 0.9 % injection 5-40 mL, 5-40 mL, IntraVENous, 2 times per day  sodium chloride flush 0.9 % injection 5-40 mL, 5-40 mL, IntraVENous, PRN  0.9 % sodium chloride infusion, 25 mL, IntraVENous, PRN  povidone-iodine (BETADINE) 10 % external solution, , Topical, Daily  [Held by provider] heparin (porcine) injection 5,000 Units, 5,000 Units, SubCUTAneous, 3 times per day  heparin (porcine) injection 3,000 Units, 3,000 Units, IntraVENous, PRN  sodium chloride 0.9 % irrigation 1,000 mL, 1,000 mL, Irrigation, Continuous PRN  Venelex ointment, , Topical, BID  albumin human 25 % IV solution 12.5 g, 12.5 g, IntraVENous, PRN  heparin (porcine) injection 2,600 Units, 2,600 Units, IntraCATHeter, PRN  sodium chloride flush 0.9 % injection 5-40 mL, 5-40 mL, IntraVENous, 2 times per day  sodium chloride flush 0.9 % injection 5-40 mL, 5-40 mL, IntraVENous, PRN  0.9 % sodium chloride infusion, 25 mL, IntraVENous, PRN  acetaminophen (TYLENOL) tablet 650 mg, 650 mg, Oral, Q6H PRN **OR** acetaminophen (TYLENOL) suppository 650 mg, 650 mg, Rectal, Q6H PRN  glucose (GLUTOSE) 40 % oral gel 15 g, 15 g, Oral, PRN  glucagon (rDNA) injection 1 mg, 1 mg, IntraMUSCular, PRN  dextrose 5 % solution, 100 mL/hr, IntraVENous, PRN  dextrose bolus (hypoglycemia) 10% 125 mL, 125 mL, IntraVENous, PRN **OR** dextrose bolus (hypoglycemia) 10% 250 mL, 250 mL, IntraVENous, PRN     This is day 14 of Enterococcal therapy, day 25 of MSSA therapy, POD 15 for the foot, POD 11 for the hand.      Allergies: Januvia [sitagliptin], Metformin and related, Vancomycin, and Mustard oil [allyl isothiocyanate]    Pertinent items from the review of systems are discussed in the HPI; the remainder of the ROS was reviewed and is negative. Objective:     Vital signs over the last 24 hours:  Temp  Av.1 °F (37.8 °C)  Min: 99.4 °F (37.4 °C)  Max: 100.6 °F (38.1 °C)  Pulse  Av.4  Min: 99  Max: 099  Systolic (72LGQ), KTO:565 , Min:119 , JBE:087   Diastolic (14PPK), PIJ:98, Min:72, Max:85  Resp  Av.5  Min: 15  Max: 33  SpO2  Av.3 %  Min: 97 %  Max: 100 %    Constitutional:  well-developed, well-nourished, much more alert today, extubated to nasal cannula oxygen  Neuropsych: awake, more alert, more interactive today, not anxious or agitated  Eyes:  pupils equal, round, reactive to light; sclerae anicteric, conjunctivae pale  ENT:  atraumatic; no labial ulcers; no thrush  Resp:  a bit clearer today, decreased at the bases  Cardiovascular:  heart regular, no murmur; generally mild extremity edema; no IV phlebitis; right PICC in place, and a right IJ HD CRRT line, exit sites benign  GI:  abdomen soft, NT, distended, bowel sounds present, no palpable masses or organomegaly; rectal tube in place, with dark diarrhea  : Munoz in place, increasing amount of clearer yellow urine now  Musculoskeletal:  no clubbing, cyanosis or petechiae; extremities with no gross effusions or acute arthritis  Skin: warm, dry, normal turgor; no acute rash, no peripheral stigmata of endocarditis. I did not examine his wounds today  ______________________________    Recent Labs     22  1530 22  0534 02/15/22  0410 22  0534 22  0534   WBC  --  11.0 9.9  --  12.8*   HGB 6.7* 7.1* 6.9*   < > 6.5*   HCT 19.5* 20.8* 20.0*   < > 18.9*   MCV  --  89.6 89.5  --  89.3   PLT  --  248 214  --  240    < > = values in this interval not displayed.      Lab Results   Component Value Date    CREATININE 4.3 (H) 2022     Lab Results   Component Value Date    LABALBU 2.8 (L) 2022     Lab Results   Component Value Date    ALT <5 (L) 02/10/2022    AST 8 (L) 02/10/2022  (H) 01/31/2022    ALKPHOS 217 (H) 02/10/2022    BILITOT 0.5 02/10/2022      Lab Results   Component Value Date    LABA1C 10.6 01/23/2022     Other recent pertinent labs:  Glucoses mostly in the 100s. Anion gap 17. [address], mildly lymphopenic, 1 myelocyte.  ______________________________    Recent pertinent micro results:  Most recent BCx negative. Most recent RCx - NRF and Candida.   ______________________________    Recent imaging results (last 7 days):     XR CHEST PORTABLE    Result Date: 2/13/2022  Perihilar opacities in the left lung worse than right are redemonstrated. May be developing a pneumatocele in the right mid chest. Endotracheal tube and nasogastric tube are acceptable. XR CHEST PORTABLE    Result Date: 2/12/2022  Endotracheal tube and nasogastric tube have been removed. New right jugular catheter with the distal tip is poorly defined. May be over the inferior right atrium and consider repeat study to better assess the tip. Patchy opacities in the left lung more than right are redemonstrated. XR CHEST 1 VIEW    Result Date: 2/16/2022  Bilateral airspace disease greater left parahilar and infrahilar primary concern is pneumonia. Rounded thick-walled lucent lesion in the right mid lung possible focal abscess. As above, other considerations include a pulmonary bleb or pneumatocele with inflammatory margins and unlikely necrotic neoplasm. . CT scan with contrast recommended. XR CHEST 1 VIEW    Result Date: 2/15/2022  ET, NG, and 2 central venous catheters are stable. Left greater than right basilar opacification is redemonstrated. Lungs are hypoinflated. XR CHEST 1 VIEW    Result Date: 2/14/2022  Low volume study with diffuse bilateral opacity, increased at the left base, for which some considerations include edema, pneumonia, and atelectasis.      IR NONTUNNELED VASCULAR CATHETER > 5 YEARS    Result Date: 2/15/2022  Status post successful ultrasound/fluoroscopically guided placement of right internal jugular temporary dialysis catheter as described above.       Assessment:     Patient Active Problem List   Diagnosis Code    Hypertension I10    Uncontrolled type 2 diabetes mellitus with diabetic polyneuropathy ([facility]) E11.42, E11.65    Cardiomyopathy ([facility] [address] I42.9    Mixed hyperlipidemia E78.2    Allergic rhinitis J30.9    Osteoarthritis M19.90    Acute osteomyelitis of left foot ([facility] [address] M86.172    [facility] N17.9    MSSA bacteremia R78.81, B95.61    Third degree burn of left hand T23.302A    Acute hypoxemic respiratory failure ([facility]) J96.01    Cardiopulmonary arrest ([facility]) I46.9    Hypertriglyceridemia E78.1    Staphylococcal arthritis of left foot ([facility]) M00.072    Antibiotic-associated diarrhea K52.1, T36.95XA    Acute encephalopathy G93.40    Infective tenosynovitis of extensor tendons of left hand M65.142    Enterococcal infection A49.1    Abscess of left hand L02.512    Acute osteomyelitis of left hand ([facility]) M86.142    Pressure injury of deep tissue of sacral region L89.156    Severe protein-calorie malnutrition ([facility]) E43    Paroxysmal atrial fibrillation ([facility]) I48.0    VAP (ventilator-associated pneumonia) ([facility]) J95.851    Abnormal chest x-ray R93.89     Assessment of today's active condition(s):      --          Background of uncontrolled DM2, neuropathy, no known PAD, multiple prior diabetic foot ulcers, infections, surgeries. Most recent wounds were at the left 1st and 2nd ray, but they had been healed for just about a year.      --          Admission this time with septic shock related primarily to deep MSSA foot infection (cellulitis, abscess, septic arthritis, acute osteo), with acute renal failure, lactic acidosis, then cardiac arrest, resuscitation, acute respiratory failure, rapid Afib. Shock resolved, respiratory failure resolved (at least at first, and I think perhaps mostly due to CHF / fluid overload after cardiac arrest.     --          Ongoing ARF, on intermittent HD, but U/O improving this week.     --          Third degree burn of left hand, with purulence beneath the lysing eschar seen to be running along extensor tendons - MRI and clinical exams c/w soft tissue abscess, septic tenosynovitis, possible acute septic arthritis at the 3rd MCPJ, with adjacent acute metacarpal and phalangeal osteo. Definitely need to treat the Enterococcus, with it isolated from OR Cxs. Still some purulence along tendon tracks, but definitely less.      --          Initial SIRS / sepsis finally resolved, after aggressive OR treatment of left foot and left hand infections.      --          Improving encephalopathy likely multifactorial (cardiac arrest, ICU stay, sedatives, sepsis, renal failure). Also wondering if he might not have a significant degree of critical-illness myopathy and/or neuropathy. Peripheral strength does seem better today than late last week.      --          Colonization with Candida, not surprising given DM, prior steroids, extensive Abx Rx.      --          Unstageable pressure ulcer of sacrum -- conservative Rx for now, with Triad; also a small occipital wound, which should do fine with local care.     --          Then this weekend a setback with difficulty in clearing secretions, hypoxemia, worsening mental status, rapid Afib, reintubation. I think this was more of an issue of retained secretions and perhaps aspiration pneumonitis, as opposed to a true invasive pneumonia. Respiratory function seemed to improve quickly after bronch and supportive care, nothing clearly new on CXR, FIO2 has come down, WBC quickly back down to normal, and nothing on bronch wash / BAL. Now extubated today, and looking better than he has recently. -- Persistent / recurrent anemia, heme (+). Treatment recs:     Continue Unasyn. Same dose for now, until renal function improves more substantially.  Continue to watch for drug allergy, clinical signs of Candidiasis, IV site trouble, Cdiff, etc.    No change in wound care right now. No need for antifungals right now. D/W his ICU RN, and his wife. Await GI eval of bleeding -- EGD tomorrow?     Electronically signed by Des Yusuf MD on 2/16/2022 at 5:37 PM.

## 2022-02-16 NOTE — PROGRESS NOTES
Nephrology Progress Note   [facility] [place]      This patient is a 42year old male whom we are following for RODRICK, HD dependent. Subjective:  he was transferred to the ICU on 2/12/22 with worsening respiratory distress requiring intubation. Extubated 2/16/22    HD on 2/15 w 2.7 l UF,post wt 103 kg   HD on 2/13 with 300 mL net UF, post weight 108.8 kg, patient received IV albumin x3 and 1 unit of packed red blood cell    ROS: No fever or chills. Social:No Family at bedside. Vitals:  BP (!) 143/84   Pulse 113   Temp 100.5 °F (38.1 °C) (Bladder)   Resp 24   Ht 6' 1\" (1.854 m)   Wt 233 lb 11 oz (106 kg)   SpO2 100%   BMI 30.83 kg/m²   I/O last 3 completed shifts: In: 3564.6 [I.V.:1026.9; Blood:385; NG/GT:1066; IV NMNIQGGZE:338.7]  Out: 0156 [Urine:245; Drains:150]  No intake/output data recorded.     Physical Exam:  Gen: Intubated  Cardiovascular:  S1, S2 without m/r/g trace lower extremity edema  Respiratory: Decreased breath sounds  Abdomen:  soft, nt, nd,   Neuro/Psy: Sedated  Access: R IJ VasCath      Medications:   ampicillin-sulbactam  3,000 mg IntraVENous Q12H    dilTIAZem  30 mg Oral 4 times per day    b complex-C-folic acid  1 capsule Oral Daily    carvedilol  12.5 mg Oral BID WC    epoetin angeles-epbx  5,000 Units IntraVENous Once per day on Tue Thu Sat    insulin glargine  25 Units SubCUTAneous BID    insulin lispro  0-18 Units SubCUTAneous Q4H    sodium chloride flush  5-40 mL IntraVENous 2 times per day    povidone-iodine   Topical Daily    pantoprazole  40 mg IntraVENous Daily    heparin (porcine)  5,000 Units SubCUTAneous 3 times per day    Venelex   Topical BID    sodium chloride flush  5-40 mL IntraVENous 2 times per day         Labs:  Recent Labs     02/14/22  0534 02/15/22  0410 02/16/22  0534   WBC 12.8* 9.9 11.0   HGB 6.5* 6.9* 7.1*   HCT 18.9* 20.0* 20.8*   MCV 89.3 89.5 89.6    214 248     Recent Labs     02/14/22  0534 02/15/22  0410 02/16/22  0534    133* 132*   K 3.9 3.8 3.6   CL 95* 93* 94*   CO2 23 22 21   GLUCOSE 176* 156* 165*   PHOS 6.4* 7.6* 5.0*   BUN 52* 71* 50*   CREATININE 4.2* 5.6* 4.3*   LABGLOM 15* 11* 15*   GFRAA 19* 13* 18*            Assessment/      RODRICK likely related to prerenal factors in the setting of sepsis, use of diuretics and losartan contributing to multifactorial ATN. Helyn Booze is not suggestive of staph associated glomerulonephritis.  Patient did not respond to IV fluids and developed acute pulmonary edema and renal replacement therapy initiated on 1/23/22        on iHD TThS.  Seems to be making more urine, non-oliguric.     -Acute pulmonary edema, improving with dialysis.     -S/P Cardio respiratory arrest       -Hyperkalemia     -Sepsis with MSSA bacteremia with left foot abscess s/p drainage, osteomyelitis, left hand I&D     -Anemia - prn prbc's     -Diabetes, uncontrolled     -Hypertension    -Hyperphosphatemia     Plan/     -HD on TTS schedule, next 2/17  -Retacrit 5K units qHD.  -renal dose medications   -avoid nephrotoxins
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
## 2022-02-16 NOTE — CARE COORDINATION
INTERDISCIPLINARY PLAN OF CARE CONFERENCE    Date/Time: 2/16/2022 10:03 AM  Completed by:  MARCO Nobles Case Management      Patient Name:  Darwin Schneider  YOB: 1975  Admitting Diagnosis: Hyperglycemia [R73.9]  RODRICK (acute kidney injury) ([facility] [address] [N17.9]  Acute renal failure, unspecified acute renal failure type ([facility] [address] [N17.9]  Syncope, unspecified syncope type [R55]     Admit Date/Time:  1/22/2022  1:32 PM    Chart reviewed. Interdisciplinary team contacted or reviewed plan related to patient progress and discharge plans. Disciplines included Case Management, Nursing, and Dietitian. Current Status:Ongoing. Extubated today  PT/OT recommendation for discharge plan of care: will need new orders when appropriate     Expected D/C Disposition:  SNF vs TBD    Discharge Plan Comments: Chart review completed. Pt remains in the ICU. Completed Interdisciplinary rounds with ICU staff. Attempted meeting pt at bedside but he was being seen by another provider. Message left for Janessa Aguirre at [facility] to follow up on the referral as pt is now extubated    CM will continue to follow and assist. Please notify CM if needs or concerns arise.     Home O2 in place on admit: No

## 2022-02-16 NOTE — PROGRESS NOTES
Shift assessment completed, see flow sheet. RASS 0. Following commands.   All sedation paused for SBT.     Intubated on AC # 8 ETT, at 26 LL. 24 / 430 / 30 %/ +5. SpO2 99%. Respirations are easy, even, and unlabored. Bilateral lung sounds diminshed.      VSS  OG in place at 70, with TF at 40 mL/hr.     PICC/VC, WNL.     All lines and monitoring devices in place. Munoz is patent and secured. Bilateral soft wrist restraints in place for patient safety.   Bed in lowest position with wheels locked.

## 2022-02-16 NOTE — PROGRESS NOTES
02/15/22 2328   Vent Information   Vent Type 840   Vent Mode AC/VC   Vt Ordered 430 mL   Rate Set 24 bmp   Peak Flow 80 L/min   Pressure Support 0 cmH20   FiO2  30 %   SpO2 99 %   SpO2/FiO2 ratio 330   Sensitivity 3   PEEP/CPAP 5   I Time/ I Time % 0 s   Humidification Source Heated wire   Humidification Temp 37   Humidification Temp Measured 37   Circuit Condensation Drained   Vent Patient Data   High Peep/I Pressure 0   Peak Inspiratory Pressure 24 cmH2O   Mean Airway Pressure 9.4 cmH20   Rate Measured 26 br/min   Vt Exhaled 459 mL   Minute Volume 11.8 Liters   I:E Ratio 1:2.90   Plateau Pressure 16 [id number]   Cough/Sputum   Sputum How Obtained Endotracheal;Suctioned   Cough Productive   Sputum Amount Small   Sputum Color Creamy   Tenacity Thick   Spontaneous Breathing Trial (SBT) RT Doc   Pulse 100   Breath Sounds   Right Upper Lobe Diminished   Right Middle Lobe Diminished   Right Lower Lobe Diminished   Left Upper Lobe Diminished   Left Lower Lobe Diminished   Additional Respiratory  Assessments   Resp 27   Position Semi-Springer's   Alarm Settings   High Pressure Alarm 40 cmH2O   Low Minute Volume Alarm 4 L/min   High Respiratory Rate 50 br/min   Patient Observation   Observations ETT SIZE 8.0, SECURED AT 26 LIP LINE. AMBU BAG AT HEAD OF BED. WATER GOOD. ETT (adult)   Placement Date/Time: 02/13/22 0515   Preoxygenation: Yes  Technique: Rapid sequence;Direct laryngoscopy  Type: Cuffed  Tube Size: 8 mm  Laryngoscope: GlideScope  Location: Oral  Insertion attempts: 1  Placement Verified By[[email or web address]] Auscultation; Chest X-ray;C. ..    Secured at 26 cm   Measured From Lips   ET Placement Right   Secured By Commercial tube wheeler   Site Condition Dry
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
## 2022-02-17 PROBLEM — K26.9 DUODENAL ULCER: Status: ACTIVE | Noted: 2022-01-01

## 2022-02-17 NOTE — H&P
History and Physical / Pre-Sedation Assessment    Patient:  Oscar Garcia   :   1975     Intended Procedure:  EGD    HPI: 42year old male with a history of HTN, DM, A fib, and nonischemic cardiomyopathy admitted with septic shock secondary to MSSA bacteremia and L foot abscess complicated by acute respiratory failure and RODRICK. Hospitalization c/b cardiorespiratory arrest, RODRICK requiring HD and anemia. Acute on chronic anemia s/p 8U PRBCs now with heme+stool.      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    Chronic osteomyelitis of left foot ([address] 10/27/2020    Closed displaced fracture of distal phalanx of right little finger 2021    Clostridium difficile infection 2016    Diabetic ulcer of left forefoot associated with type 2 diabetes mellitus, with necrosis of bone ([address] 2019    Diabetic ulcer of right great toe associated with type 2 diabetes mellitus, with necrosis of bone ([address] 2019    Failed soft 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 toe amputation site 10/26/2020    History of hyperbaric oxygen therapy 10/28/2020    [facility]- [facility] - compromised flap    Migraine     Possible perforated tympanic membrane     as a result of recurrent otitis    Post-op hematoma of left foot 2020    Recurrent otitis media     Septic shock (HCC)     Syncope     Tear of medial meniscus of left knee     Tobacco use     Toe osteomyelitis, left ([address] 2020     Past Surgical History:   Procedure Laterality Date    ARM SURGERY Left 2022    LEFT HAND INCISION AND DRAINAGE performed by Ying Vines MD at [address] Left 2022    ULCER DEBRIDEMENT LEFT FOOT performed by Elvia Hale DPM at [address] IR NONTUNNELED VASCULAR CATHETER  2022    IR NONTUNNELED VASCULAR CATHETER 2/11/2022 [facility] SPECIAL PROCEDURES    KNEE ARTHROSCOPY Left [id number]    LEFT KNEE ARTHROSCOPY , SYNOVECTOMY       OTHER SURGICAL HISTORY Left 07/24/2020    PARTIAL LEFT FOOT AMPUTATION    TOE AMPUTATION Right 8/23/2019    PARTIAL RIGHT FOOT AMPUTATION performed by Jackson Burton DPM at [address] Left 7/24/2020    PARTIAL LEFT FOOT AMPUTATION performed by Jackson Burton DPM at [address] TOE AMPUTATION Left 10/22/2020    PARTIAL LEFT FOOT AMPUTATION WITH GRAFT APPLICATION performed by Jackson Burton DPM at [address] N/A 2/17/2022    EGD W/ANES. performed by Nate Amor MD at [facility] EXTRACTION         Medications reviewed  Prior to Admission medications    Medication Sig Start Date End Date Taking? Authorizing Provider   Insulin Degludec (TRESIBA FLEXTOUCH) 200 UNIT/ML SOPN INJECT 76 UNITS INTO THE SKIN NIGHTLY 1/17/22  Yes Dixie Florian MD   losartan (COZAAR) 50 MG tablet TAKE 1 TABLET BY MOUTH DAILY 1/13/22  Yes Dixie Florian MD   tiZANidine (ZANAFLEX) 4 MG tablet TAKE 2 TABLETS BY MOUTH NIGHTLY 1/4/22  Yes Dixie Florian MD   carvedilol (COREG) 12.5 MG tablet TAKE 1 TABLET BY MOUTH 2 TIMES DAILY (WITH MEALS) 12/30/21  Yes Dixie Florian MD   rosuvastatin (CRESTOR) 20 MG tablet TAKE 1 TABLET BY MOUTH NIGHTLY 12/30/21  Yes Maged Lopez MD   traZODone (DESYREL) 50 MG tablet TAKE 1 TABLET BY MOUTH NIGHTLY 12/7/21  Yes Dixie Florian MD   furosemide (LASIX) 20 MG tablet TAKE 1 TABLET BY MOUTH DAILY 12/7/21  Yes Dixie Florian MD   gabapentin (NEURONTIN) 400 MG capsule TAKE 2 CAPSULES BY MOUTH 3 TIMES DAILY FOR 90 DAYS.  11/30/21 2/28/22 Yes Dixie Florian MD   insulin lispro (HUMALOG KWIKPEN) 200 UNIT/ML SOPN pen Inject 25 Units into the skin 3 times daily (before meals) 10/14/21  Yes Dixie Florian MD   glimepiride (AMARYL) 4 MG tablet TAKE 1 TABLET BY MOUTH EVERY MORNING (BEFORE BREAKFAST). 9/10/21  Yes Rosaura Michael MD   PARoxetine (PAXIL) 10 MG tablet TAKE 1 TABLET BY MOUTH EVERY MORNING 9/10/21  Yes Rosaura Michael MD   blood glucose test strips (ONETOUCH ULTRA) strip USE TO TEST BLOOD GLUCOSE DAILY. DX:E11.9 3/5/21  Yes Rosaura Michael MD   Blood Glucose Monitoring Suppl (CONTOUR NEXT EZ) w/Device KIT Use to test glucose daily. DX:E11.9 12/10/20  Yes Rosaura Michael MD   Insulin Pen Needle 32G X 6 MM MISC Use with insulin daily . DX:E11.9 12/4/20  Yes Rosaura Michael MD   blood glucose monitor strips Use to test blood sugar daily. DX:E11.9 12/4/20  Yes Rosaura Michael MD   oxymetazoline (AFRIN) 0.05 % nasal spray 2 sprays by Nasal route daily Indications: prior to HBO   Yes Historical Provider, MD   loratadine (CLARITIN) 10 MG tablet Take 10 mg by mouth nightly as needed    Yes Historical Provider, MD   sildenafil (VIAGRA) 100 MG tablet TAKE 1 TABLET BY MOUTH AS NEEDED FOR ERECTILE DYSFUNCTION 8/27/19  Yes Rosaura Michael MD        Allergies: Allergies   Allergen Reactions    Januvia [Sitagliptin] Nausea Only     Has taken metformin without side effects in the past.  Nausea with Janumet in the past.     Metformin And Related      GI Upset    Vancomycin      Pt had red face, swelling itching of eyelids, sore throat after receiving vancmomyin and cefepime. I think this was a histamine releasing reaction from vancomycin most likely. The cefepime was switched to Zosyn and patient had no reaction to Zosyn    Mustard Elena Savory Isothiocyanate] Swelling and Rash       Nurses notes reviewed and agreed.     Physical Exam:  Vital Signs: BP (!) 148/95   Pulse 110   Temp 98.8 °F (37.1 °C)   Resp 22   Ht 6' 1\" (1.854 m)   Wt 238 lb 12.1 oz (108.3 kg)   SpO2 100%   BMI 31.50 kg/m²    Airway: Mallampati: II (soft palate, uvula, fauces visible)  Pulmonary:Normal  Cardiac:Normal  Abdomen:Normal    Pre-Procedure Assessment / Plan:  ASA: Class 3 - A patient with severe systemic disease that limits activity but is not incapacitating  Level of Sedation Plan: Moderate sedation  Post Procedure plan: Return to same level of care    I assessed the patient and find that the patient is in satisfactory condition to proceed with the planned procedure and sedation plan. I have explained the risk, benefits, and alternatives to the procedure; the patient understands and agrees to proceed.        Nasir Iraheta MD  2/17/2022

## 2022-02-17 NOTE — PROGRESS NOTES
Speech Language Pathology    Per chart review, pt is NPO for EGD today. ST will follow for clinical reassessment of swallow as appropriate. Anticipate need for formal imaging of the swallow at some point when medically stable, given his current dysphonia and prolonged/repeated intubation. Khadra Alan MS CCC-SLP  Speech Language Pathologist   Phone [phone]

## 2022-02-17 NOTE — PROGRESS NOTES
Nephrology Progress Note   KHares. [place]      This patient is a 42year old male whom we are following for RODRICK, HD dependent. Subjective:  Seen and examined on HD on 2/17 with 3-1/2 L UF goal will    HD on 2/15 w 2.7 l UF,post wt 103 kg   HD on 2/13 with 300 mL net UF, post weight 108.8 kg, patient received IV albumin x3 and 1 unit of prbc    EGD on 2/17 showed duodenal ulcer, NG tube placed, feeding to be started  Transferred to the ICU on 2/12/22 with worsening respiratory distress requiring intubation. Extubated 2/16/22      ROS: No fever or chills. Social:No Family at bedside. Vitals:  BP (!) 140/86   Pulse 113   Temp 98.8 °F (37.1 °C) (Bladder)   Resp 18   Ht 6' 1\" (1.854 m)   Wt 260 lb 2.3 oz (118 kg)   SpO2 98%   BMI 34.32 kg/m²   I/O last 3 completed shifts: In: 2341.4 [I.V.:764; Blood:293.8; NG/GT:592; IV Piggyback:691.6]  Out: 900 [Urine:350; Drains:150; Stool:400]  No intake/output data recorded.     Physical Exam:  Gen: Intubated  Cardiovascular:  S1, S2 without m/r/g trace lower extremity edema  Respiratory: Decreased breath sounds  Abdomen:  soft, nt, nd,   Neuro/Psy: Sedated  Access: R IJ VasCath      Medications:   insulin glargine  15 Units SubCUTAneous BID    pantoprazole  40 mg IntraVENous BID    ampicillin-sulbactam  3,000 mg IntraVENous Q12H    dilTIAZem  30 mg Oral 4 times per day    b complex-C-folic acid  1 capsule Oral Daily    carvedilol  12.5 mg Oral BID WC    epoetin [place]-epbx  5,000 Units IntraVENous Once per day on Tue Thu Sat    insulin lispro  0-18 Units SubCUTAneous Q4H    sodium chloride flush  5-40 mL IntraVENous 2 times per day    povidone-iodine   Topical Daily    [Held by provider] heparin (porcine)  5,000 Units SubCUTAneous 3 times per day    Venelex   Topical BID    sodium chloride flush  5-40 mL IntraVENous 2 times per day         Labs:  Recent Labs     02/15/22  0410 02/15/22  0410 02/16/22  0534 02/16/22  0534 02/16/22  1530 02/16/22  2200 02/17/22 0410   WBC 9.9  --  11.0  --   --   --  12.5*   HGB 6.9*   < > 7.1*   < > 6.7* 7.6* 7.4*   HCT 20.0*   < > 20.8*   < > 19.5* 22.6* 22.1*   MCV 89.5  --  89.6  --   --   --  89.7     --  248  --   --   --  268    < > = values in this interval not displayed. Recent Labs     02/15/22  0410 02/16/22  0534 02/17/22  0410   * 132* 137   K 3.8 3.6 4.3   CL 93* 94* 97*   CO2 22 21 20*   GLUCOSE 156* 165* 118*   PHOS 7.6* 5.0* 7.3*   BUN 71* 50* 75*   CREATININE 5.6* 4.3* 5.5*   LABGLOM 11* 15* 11*   GFRAA 13* 18* 14*            Assessment/      RODRICK likely related to prerenal factors in the setting of sepsis, use of diuretics and losartan contributing to multifactorial ATN. Florance Pair is not suggestive of staph associated glomerulonephritis.  Patient did not respond to IV fluids and developed acute pulmonary edema and renal replacement therapy initiated on 1/23/22        on iHD TThS.  Seems to be making more urine, non-oliguric.     -Acute pulmonary edema, improving with dialysis.     -S/P Cardio respiratory arrest       -Hyperkalemia     -Sepsis with MSSA bacteremia with left foot abscess s/p drainage, osteomyelitis, left hand I&D     -Anemia - prn prbc's     -Diabetes, uncontrolled     -Hypertension    -Hyperphosphatemia     Plan/     -HD on TTS schedule   3 to 3-1/2 L UF goal   Use Nepro for tube feeds   Monitor signs of renal recovery, 335 mL of urine in the last 24 hours noted  -Retacrit 5K units qHD.  -renal dose medications   -avoid nephrotoxins

## 2022-02-17 NOTE — CARE COORDINATION
INTERDISCIPLINARY PLAN OF CARE CONFERENCE    Date/Time: 2/17/2022 9:21 AM  Completed by:  MARCO Fu Case Management      Patient Name:  Samm Mathew  YOB: 1975  Admitting Diagnosis: Hyperglycemia [R73.9]  RODRICK (acute kidney injury) ([facility] [N17.9]  Acute renal failure, unspecified acute renal failure type ([facility] [N17.9]  Syncope, unspecified syncope type [R55]     Admit Date/Time:  1/22/2022  1:32 PM    Chart reviewed. Interdisciplinary team contacted or reviewed plan related to patient progress and discharge plans. Disciplines included Case Management, Nursing, and Dietitian. Current Status:Ongoing. PT/OT recommendation for discharge plan of care: SNF    Expected D/C Disposition:  Skilled nursing facility    Discharge Plan Comments: Chart review completed. Met with pt at bedside. Attempted discussion but pt only looked at [address] and stated yes. Spoke with pt's RN who states pt normally only responds yes/no. No family at bedside. Message left for pt's wife Yari Vasquez requesting a call back to discuss SNF recommendations. Home O2 in place on admit: No    Addendum at 9:40am: Received notification CM Supervisor to call Saroj Saravia with [facility] back at [phone]. Message left    Addendum at 11:15am: Met with pt and his wife Yari Vasquez at bedside. Provided her with a SNF list. She stated her SNF choices are 1. [facility] 2. [facility] 3. [facility] 4. [facility] 5. [facility] 6. [facility]. She is aware we may to look at a SNF with in house HD as transportation may be a barrier if he is unable to transport via car and needs a cot. She stated hospital can speak with the insurance . She stated first opening on transportation when inquiring about preferences. Awaiting Subha at [facility] to call writer back.      Spoke with Suki Rhodes at [facility] admissions to update that pt is now extubated and that we are trying for a SNF so maybe [facility] [place]. Kimberly Alan states she will call once a slot has been obtained. Spoke with Vivien Stein at [facility] who states she will send in network SNF's to writer as she can assist with discharge planning. Vivien Stein aware no clinical information will be provided to her as the UR team communicates with them. Addendum at 12:53pm: Subha at [facility] stating they don't accept pt's insurance. Message left for Devorah at [facility], Hue Payan at [facility] [employer] and Ace at [facility] requesting a call back. Lita at the [facility] states they only accept MCR    At 1:07pm: [facility] [employer] [facility]  doesn't take insurance     Addendum at 1:45pm: Message left for pt's wife Junior Garcia asking for a call back as she called writer. No beds at [facility]. [address] doesn't take pt's insurance.

## 2022-02-17 NOTE — ANESTHESIA PRE PROCEDURE
Department of Anesthesiology  Preprocedure Note       Name:  Hillary Rasheed   Age:  42 y.o.  :  1975                                          MRN:  [id number]         Date:  2022      Surgeon: Roshan Santos):  Yaw Stone MD    Procedure: Procedure(s):  EGD W/ANES. Medications prior to admission:   Prior to Admission medications    Medication Sig Start Date End Date Taking? Authorizing Provider   Insulin Degludec (TRESIBA FLEXTOUCH) 200 UNIT/ML SOPN INJECT 76 UNITS INTO THE SKIN NIGHTLY 22  Yes Isreal Seen, MD   losartan (COZAAR) 50 MG tablet TAKE 1 TABLET BY MOUTH DAILY 22  Yes Isreal Mora MD   tiZANidine (ZANAFLEX) 4 MG tablet TAKE 2 TABLETS BY MOUTH NIGHTLY 22  Yes Isreal Mora, MD   carvedilol (COREG) 12.5 MG tablet TAKE 1 TABLET BY MOUTH 2 TIMES DAILY (WITH MEALS) 21  Yes Isreal Mora MD   rosuvastatin (CRESTOR) 20 MG tablet TAKE 1 TABLET BY MOUTH NIGHTLY 21  Yes Kimberly Pancoast, MD   traZODone (DESYREL) 50 MG tablet TAKE 1 TABLET BY MOUTH NIGHTLY 21  Yes Isreal Mora MD   furosemide (LASIX) 20 MG tablet TAKE 1 TABLET BY MOUTH DAILY 21  Yes Isreal Mora MD   gabapentin (NEURONTIN) 400 MG capsule TAKE 2 CAPSULES BY MOUTH 3 TIMES DAILY FOR 90 DAYS. 21 Yes Isreal Mora MD   insulin lispro (HUMALOG KWIKPEN) 200 UNIT/ML SOPN pen Inject 25 Units into the skin 3 times daily (before meals) 10/14/21  Yes Isreal Mora, MD   glimepiride (AMARYL) 4 MG tablet TAKE 1 TABLET BY MOUTH EVERY MORNING (BEFORE BREAKFAST). 9/10/21  Yes Isreal Mora MD   PARoxetine (PAXIL) 10 MG tablet TAKE 1 TABLET BY MOUTH EVERY MORNING 9/10/21  Yes Isreal Mora MD   blood glucose test strips (ONETOUCH ULTRA) strip USE TO TEST BLOOD GLUCOSE DAILY. DX:E11.9 3/5/21  Yes Isreal Mora MD   Blood Glucose Monitoring Suppl (CONTOUR NEXT EZ) w/Device KIT Use to test glucose daily. DX: E11.9 12/10/20  Yes Kimmie Hernandez MD   Insulin Pen Needle 32G X 6 MM MISC Use with insulin daily . DX:E11.9 12/4/20  Yes Kimmie Hernandez MD   blood glucose monitor strips Use to test blood sugar daily.   DX:E11.9 12/4/20  Yes Kimmie Hernandez MD   oxymetazoline (AFRIN) 0.05 % nasal spray 2 sprays by Nasal route daily Indications: prior to HBO   Yes Historical Provider, MD   loratadine (CLARITIN) 10 MG tablet Take 10 mg by mouth nightly as needed    Yes Historical Provider, MD   sildenafil (VIAGRA) 100 MG tablet TAKE 1 TABLET BY MOUTH AS NEEDED FOR ERECTILE DYSFUNCTION 8/27/19  Yes Kimmie Hernandez MD       Current medications:    Current Facility-Administered Medications   Medication Dose Route Frequency Provider Last Rate Last Admin    insulin glargine (LANTUS) injection vial 15 Units  15 Units SubCUTAneous BID Harjeet Tam MD   15 Units at 02/17/22 1050    pantoprazole (PROTONIX) injection 40 mg  40 mg IntraVENous BID Tobi Palmer MD   40 mg at 02/17/22 1045    0.9 % sodium chloride infusion   IntraVENous PRN Harjeet Tam MD        ampicillin-sulbactam (UNASYN) 3000 mg ivpb minibag  3,000 mg IntraVENous Q12H Halie Pettit MD   Stopped at 02/16/22 2349    dilTIAZem (CARDIZEM) tablet 30 mg  30 mg Oral 4 times per day Harjeet Tam MD   30 mg at 02/16/22 0555    b complex-C-folic acid (NEPHROCAPS) capsule 1 mg  1 capsule Oral Daily Jeffry Perry MD   1 mg at 02/16/22 0748    carvedilol (COREG) tablet 12.5 mg  12.5 mg Oral BID  Adrienne Orlando MD   12.5 mg at 02/16/22 0749    epoetin [place]-epbx (RETACRIT) injection 5,000 Units  5,000 Units IntraVENous Once per day on Tue Thu Sat Adrienne Orlando MD   5,000 Units at 02/15/22 0756    insulin lispro (HUMALOG) injection vial 0-18 Units  0-18 Units SubCUTAneous Q4H Denisse Morales MD   3 Units at 02/16/22 1936    sodium chloride flush 0.9 % injection 5-40 mL  5-40 mL IntraVENous 2 times per day Denisse Morales MD   10 mL at 02/17/22 1045    sodium chloride flush 0.9 % injection 5-40 mL  5-40 mL IntraVENous PRN Melba Mckay MD        0.9 % sodium chloride infusion  25 mL IntraVENous PRN Melba Mckay MD 5 mL/hr at 02/09/22 1630 Rate Verify at 02/09/22 1630    povidone-iodine (BETADINE) 10 % external solution   Topical Daily Maria C Colby MD   Given at 02/16/22 0748    [Held by provider] heparin (porcine) injection 5,000 Units  5,000 Units SubCUTAneous 3 times per day Gomez Montague MD   5,000 Units at 02/16/22 0555    heparin (porcine) injection 3,000 Units  3,000 Units IntraVENous PRN Layla Guillen MD   3,000 Units at 02/04/22 1107    sodium chloride 0.9 % irrigation 1,000 mL  1,000 mL Irrigation Continuous PRN Layla Guillen MD        Venelex ointment   Topical BID Maria C Colby MD   Given at 02/17/22 1046    albumin human 25 % IV solution 12.5 g  12.5 g IntraVENous PRN Audrey Pi,  mL/hr at 02/13/22 1352 12.5 g at 02/13/22 1352    heparin (porcine) injection 2,600 Units  2,600 Units IntraCATHeter PRN Audrey Pi, DPM   2,600 Units at 02/13/22 1651    sodium chloride flush 0.9 % injection 5-40 mL  5-40 mL IntraVENous 2 times per day Audrey Pi, DPM   10 mL at 02/16/22 2029    sodium chloride flush 0.9 % injection 5-40 mL  5-40 mL IntraVENous PRN Audrey Pi, DPM        0.9 % sodium chloride infusion  25 mL IntraVENous PRN Audrey Pi, DPM   Stopped at 02/07/22 0806    acetaminophen (TYLENOL) tablet 650 mg  650 mg Oral Q6H PRN Audrey Pi, DPM   650 mg at 02/16/22 0751    Or    acetaminophen (TYLENOL) suppository 650 mg  650 mg Rectal Q6H PRN Audrey Pi, DPM        glucose (GLUTOSE) 40 % oral gel 15 g  15 g Oral PRN Audrey Pi, DPM        glucagon (rDNA) injection 1 mg  1 mg IntraMUSCular PRN Audrey Pi, DPM        dextrose 5 % solution  100 mL/hr IntraVENous PRN Audrey Zuniga, KANGM        dextrose bolus (hypoglycemia) 10% 125 mL  125 mL IntraVENous PRN Audrey Zuniga, PUSHPA        Or    dextrose bolus (hypoglycemia) 10% 250 mL  250 mL IntraVENous PRN Elma Garcia DPM         Facility-Administered Medications Ordered in Other Encounters   Medication Dose Route Frequency Provider Last Rate Last Admin    propofol injection   IntraVENous PRN EVANS Conner CRNA   100 mg at 02/17/22 1233    lidocaine 2 % injection   IntraVENous PRN EVANS Conner - CRNA   40 mg at 02/17/22 1233    lactated ringers infusion   IntraVENous Continuous PRN EVANS Conner CRNA   New Bag at 02/17/22 1230       Allergies: Allergies   Allergen Reactions    Januvia [Sitagliptin] Nausea Only     Has taken metformin without side effects in the past.  Nausea with Janumet in the past.     Metformin And Related      GI Upset    Vancomycin      Pt had red face, swelling itching of eyelids, sore throat after receiving vancmomyin and cefepime. I think this was a histamine releasing reaction from vancomycin most likely.  The cefepime was switched to Zosyn and patient had no reaction to Zosyn    Mustard [employer] Isothiocyanate] Swelling and Rash       Problem List:    Patient Active Problem List   Diagnosis Code    Hypertension I10    Uncontrolled type 2 diabetes mellitus with diabetic polyneuropathy ([facility]) E11.42, E11.65    Cardiomyopathy ([facility] [address] I42.9    Mixed hyperlipidemia E78.2    Allergic rhinitis J30.9    Osteoarthritis M19.90    Acute osteomyelitis of left foot ([facility]) M86.172    RODRICK (acute kidney injury) ([facility] [address] N17.9    MSSA bacteremia R78.81, B95.61    Third degree burn of left hand T23.302A    Acute hypoxemic respiratory failure ([facility]) J96.01    Cardiopulmonary arrest ([facility]) I46.9    Hypertriglyceridemia E78.1    Staphylococcal arthritis of left foot ([facility]) M00.072    Antibiotic-associated diarrhea K52.1, T36.95XA    Acute encephalopathy G93.40    Infective tenosynovitis of extensor tendons of left hand M65.142    Enterococcal infection A49.1    Abscess of left hand L02.512    Acute osteomyelitis of left hand ([facility]) M86.142    Pressure injury of deep tissue of sacral region L89.156    Severe protein-calorie malnutrition ([facility]) E43    Paroxysmal atrial fibrillation ([facility]) I48.0    VAP (ventilator-associated pneumonia) ([facility]) J95.851    Abnormal chest x-ray R93.89       Past Medical History:        Diagnosis Date    Abscess of left foot 1/24/2022    Acute osteomyelitis of right hallux ([address] 08/2019    Cellulitis of left foot 7/26/2020    CHF (congestive heart failure) ([address] 09/2014    presumably from viral myocarditis    Chronic osteomyelitis of left foot ([address] 10/27/2020    Closed displaced fracture of distal phalanx of right little finger 4/8/2021    Clostridium difficile infection 11/01/2016    Diabetic ulcer of left forefoot associated with type 2 diabetes mellitus, with necrosis of bone ([address] 8/1/2019    Diabetic ulcer of right great toe associated with type 2 diabetes mellitus, with necrosis of bone ([address] 08/2019    Failed soft 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 toe amputation site 10/26/2020    History of hyperbaric oxygen therapy 10/28/2020    [facility]- [facility] - compromised flap    Migraine     Possible perforated tympanic membrane     as a result of recurrent otitis    Post-op hematoma of left foot 11/12/2020    Recurrent otitis media     Septic shock ([facility])     Syncope     Tear of medial meniscus of left knee     Tobacco use     Toe osteomyelitis, left ([address] 7/24/2020       Past Surgical History:        Procedure Laterality Date    ARM SURGERY Left 2/5/2022    LEFT HAND INCISION AND DRAINAGE performed by Dorothy Woodard MD at [address] Left 2/1/2022    ULCER DEBRIDEMENT LEFT FOOT performed by Mónica Butt DPM at [address] IR NONTUNNELED VASCULAR CATHETER  2/11/2022    IR NONTUNNELED VASCULAR CATHETER 2/11/2022 [facility]    KNEE ARTHROSCOPY Left [id number]    LEFT KNEE ARTHROSCOPY , SYNOVECTOMY       OTHER SURGICAL HISTORY Left 07/24/2020    PARTIAL LEFT FOOT AMPUTATION    TOE AMPUTATION Right 2019    PARTIAL RIGHT FOOT AMPUTATION performed by Tay Will DPM at [facility] TOE AMPUTATION Left 2020    PARTIAL LEFT FOOT AMPUTATION performed by Tay Will DPM at [facility] TOE AMPUTATION Left 10/22/2020    PARTIAL LEFT FOOT AMPUTATION WITH GRAFT APPLICATION performed by Tay Will DPM at [address]      WISDOM TOOTH EXTRACTION         Social History:    Social History     Tobacco Use    Smoking status: Former Smoker     Packs/day: 0.50     Years: 2.00     Pack years: 1.00     Quit date: 2005     Years since quittin.5    Smokeless tobacco: Former User     Quit date: 2005    Tobacco comment: SMOKED OCCASIONALLY UNTIL 8 YEARS AGO   Substance Use Topics    Alcohol use: Yes     Comment: RARELY                                Counseling given: Not Answered  Comment: SMOKED OCCASIONALLY UNTIL 8 YEARS AGO      Vital Signs (Current):   Vitals:    22 0800 22 0900 22 1000 22 1100   BP: 139/78 (!) 145/87 (!) 140/87 (!) 140/86   Pulse: 102 105 103 113   Resp: 15 19 17 18   Temp:    98.8 °F (37.1 °C)   TempSrc:    Bladder   SpO2: 98% 98% 97% 98%   Weight:       Height:                                                  BP Readings from Last 3 Encounters:   22 (!) 140/86   22 (!) 149/93   22 116/62       NPO Status: Time of last liquid consumption: 1000 (ice chips)                        Time of last solid consumption: 2200 (unknown since before admision)                        Date of last liquid consumption: 22                        Date of last solid food consumption: 22    BMI:   Wt Readings from Last 3 Encounters:   22 260 lb 2.3 oz (118 kg)   10/14/21 282 lb (127.9 kg)   21 276 lb (125.2 kg)     Body mass index is 34.32 kg/m².     CBC:   Lab Results   Component Value Date    WBC 12.5 2022    RBC 2.46 2022    HGB 7.4 2022    HCT 22.1 02/17/2022    MCV 89.7 02/17/2022    RDW 15.7 02/17/2022     02/17/2022       CMP:   Lab Results   Component Value Date     02/17/2022    K 4.3 02/17/2022    K 3.7 01/23/2022    CL 97 02/17/2022    CO2 20 02/17/2022    BUN 75 02/17/2022    CREATININE 5.5 02/17/2022    GFRAA 14 02/17/2022    AGRATIO 0.8 01/22/2022    LABGLOM 11 02/17/2022    LABGLOM 103 09/14/2015    GLUCOSE 118 02/17/2022    PROT 6.0 02/10/2022    CALCIUM 8.0 02/17/2022    BILITOT 0.5 02/10/2022    ALKPHOS 217 02/10/2022    AST 8 02/10/2022    ALT <5 02/10/2022       POC Tests:   Recent Labs     02/17/22  1158   POCGLU 127*       Coags:   Lab Results   Component Value Date    PROTIME 12.7 02/07/2022    INR 1.12 02/07/2022    APTT 67.1 01/24/2022       HCG (If Applicable): No results found for: PREGTESTUR, PREGSERUM, HCG, HCGQUANT     ABGs:   Lab Results   Component Value Date    PHART 7.467 02/16/2022    PO2ART 93.6 02/16/2022    NVB8RGV 31.7 02/16/2022    UMA1OYQ 22.4 02/16/2022    BEART -1.0 02/16/2022    Z5BXHYYI 97.6 02/16/2022        Type & Screen (If Applicable):  No results found for: LABABO, LABRH    Drug/Infectious Status (If Applicable):  No results found for: HIV, HEPCAB    COVID-19 Screening (If Applicable):   Lab Results   Component Value Date    COVID19 Not Detected 01/22/2022           Anesthesia Evaluation  Patient summary reviewed and Nursing notes reviewed no history of anesthetic complications:   Airway: Mallampati: III  TM distance: <3 FB   Neck ROM: limited  Mouth opening: > = 3 FB Dental:          Pulmonary:   (+) shortness of breath:  rhonchi (SCATTERED),      (-) COPD, asthma, recent URI, sleep apnea and not a current smoker          Patient did not smoke on day of surgery.                  Cardiovascular:    (+) hypertension:, valvular problems/murmurs (AND TR ON ECHO, EF 35): MR, dysrhythmias: atrial fibrillation, CHF (6533, VIRAL): systolic, IRVIN:, hyperlipidemia    (-) pacemaker, past MI, CAD and CABG/stent      Rhythm: regular  Rate: normal                    Neuro/Psych:   (+) neuromuscular disease (NEUROPATHY):, headaches: migraine headaches,    (-) seizures and CVA           GI/Hepatic/Renal:   (+) liver disease (SHOCK LIVER /SEPSIS):, renal disease: ARF, morbid obesity     (-) GERD and bowel prep       Endo/Other:    (+) DiabetesType II DM, using insulin, blood dyscrasia (ANEMIA): arthritis: OA., no malignancy/cancer. (-) hypothyroidism, hyperthyroidism, no malignancy/cancer               Abdominal:       Abdomen: soft. Vascular: Other Findings: BANEGAS, WOUND VAC, RUE PICC, R IJ PC            Anesthesia Plan      TIVA     ASA 4       Induction: intravenous. MIPS: Prophylactic antiemetics administered. Anesthetic plan and risks discussed with patient and spouse. This pre-anesthesia assessment may be used as a history and physical.    DOS STAFF ADDENDUM:    Pt seen and examined, chart reviewed (including anesthesia, drug and allergy history). No interval changes to history and physical examination. Anesthetic plan, risks, benefits, alternatives, and personnel involved discussed with patient. Patient verbalized an understanding and agrees to proceed.       Ishaan Jorge MD  February 17, 2022  12:39 PM    Ishaan Jorge MD   2/17/2022
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
## 2022-02-17 NOTE — PROGRESS NOTES
PATIENT RESTING EASY, RESPIRATIONS EASY ON 4L NASAL CANULA.  1 UNIT OF PRBC's COMPLETED, WILL OBTAINED H/H AT 2300. SINUS RHYTHM/SINUS TACH ON TELEMETRY. PATIENT IS ALERT AND RESPONDS APPROPRIATELY. BANEGAS CATH IN PLACE WITH TEMP SENSOR, PATIENTS TEMP VARIES FROM .1. VSS. NO COMPLAINTS AT THIS TIME.  WOUND IN PLACE CURRENTLY ON THE LOWER LEFT EXTREMITY. RIGHT DOUBLE PICC LINE PATENT. FLUSHES WITHOUT DIFFICULTIES.

## 2022-02-17 NOTE — FLOWSHEET NOTE
Treatment time: 3.5 hours  Net UF: 2650 ml    Pre weight: 108.3 kg   Post weight: 105.7 kg  EDW: TBD kg    Access used: Right IJ  Access function: OKk with -350 ml/min    Medications or blood products given: 25% albumin once, Retacrit    Regular outpatient schedule: T.T.S    Summary of response to treatment: Pt tolerated ok with HD, vital signs stable, albumin given once at the first hour of HD, HR <120s during HD. HD completed in full, heparin dwell in NTDC, capped and clamped. Cirt Line: Initial Hct: 24.2;   End Profile : A; Refill ( Hct.1 -27.2  subtract Hct 2- 26.8): 0.4 (no Refill); BV: -9.5 %      Copy of dialysis treatment record placed in chart, to be scanned into EMR.     02/17/22 1240 02/17/22 1630   Vital Signs   BP (!) 148/95 (!) 142/93   Temp 98.8 °F (37.1 °C) 99 °F (37.2 °C)   Pulse 110 108   Resp 22 19   SpO2 100 %  --    Weight 238 lb 12.1 oz (108.3 kg) 233 lb 0.4 oz (105.7 kg)   Weight Method Actual;Bed scale Actual;Bed scale   Percent Weight Change -8.22 -2.4   Post-Hemodialysis Assessment   Post-Treatment Procedures  --  Blood returned;Catheter capped, clamped and heparinized x 2 ports   Machine Disinfection Process  --  Acid/Vinegar Clean;Heat Disinfect; Exterior Machine Disinfection   Rinseback Volume (ml)  --  400 ml   Total Liters Processed (l/min)  --  60.4 l/min   Dialyzer Clearance  --  Moderately streaked   Duration of Treatment (minutes)  --  210 minutes   Heparin amount administered during treatment (units)  --  0 units   Hemodialysis Intake (ml)  --  450 ml   Hemodialysis Output (ml)  --  3100 ml   NET Removed (ml)  --  2650 ml   Tolerated Treatment  --  Good
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
## 2022-02-17 NOTE — BRIEF OP NOTE
Brief Postoperative Note      Patient: Darwin Schneider  YOB: 1975  MRN: [id number]    Date of Procedure: 2/17/2022    Pre-Op Diagnosis: ANEMIA    Post-Op Diagnosis: Gigantic clean based duodenal bulb ulcer with pigmented spots, no active bleeding       Procedure(s):  EGD W/ANES. Surgeon(s):  Jerri Pino MD    Assistant:  * No surgical staff found *    Anesthesia: Monitor Anesthesia Care    Estimated Blood Loss (mL): Minimal    Complications: None    Specimens:   * No specimens in log *    Implants:  * No implants in log *      Drains:   Negative Pressure Wound Therapy Foot Left;Dorsal (Active)   $ Standard NPWT <=50 sq cm PER TX $ Yes 02/02/22 1134   $ Standard NPWT >50 sq cm PER TX $ Yes 02/17/22 1155   Wound Type Surgical 02/17/22 1155   Unit Type Vac with Veraflo 02/17/22 1155   Dressing Type Black foam 02/17/22 1155   Number of pieces used 1 02/17/22 1155   Cycle Continuous 02/17/22 1155   Target Pressure (mmHg) 125 02/17/22 1155   Intensity 1 02/17/22 1155   Irrigation Solution Sodium chloride 0.9% 02/17/22 1155   Instillation Volume  14 mL 02/17/22 1155   Soak Time  3 minutes 02/17/22 1155   Vac Frequency 2 hours 02/17/22 1155   Canister changed?  No 02/17/22 1155   Dressing Status Changed 02/17/22 1155   Dressing Changed Changed/New 02/17/22 1155   Drainage Amount Small 02/17/22 1155   Drainage Description Serous 02/17/22 1155   Dressing Change Due 02/16/22 02/14/22 1113   Output (ml) 150 ml 02/16/22 0500   Wound Assessment Granulation tissue;Red;Slough 02/17/22 1155   Shanita-wound Assessment Intact 02/14/22 1113   Odor None 02/17/22 1155       NG/OG/NJ/NE Tube Nasogastric 16 fr Right nostril (Active)       Rectal Tube With balloon (Active)   Stool Appearance Watery 02/16/22 1200   Stool Color Black 02/16/22 1200   Stool Amount Large 02/16/22 1200   Rectal Tube Output 400 ml 02/16/22 1200       Urethral Catheter Temperature probe (Active)   $ Urethral catheter insertion $ Not inserted for procedure 02/06/22 0831   Catheter Indications Need for fluid volume management of the critically ill patient in a critical care setting 02/17/22 0400   Site Assessment Pink;Moist 02/17/22 0400   Urine Color Gita;Orange 02/15/22 0434   Urine Appearance Cloudy 02/17/22 0400   Output (mL) 150 mL 02/17/22 0400       [REMOVED] NG/OG/NJ/NE Tube Center mouth (Removed)   Surrounding Skin Dry 02/04/22 0813   Securement device Yes 02/04/22 0813   Status Chimney 02/04/22 0813   Placement Verified by X-Ray (repeat) 02/04/22 0813   NG/OG/NJ/NE External Measurement (cm) 63 cm 02/04/22 0813   Drainage Appearance Bile 02/04/22 0813   Tube Feeding High Protein 02/04/22 0813   Tube Feeding Status Continuous 02/04/22 0813   Rate/Schedule 0 mL/hr 02/05/22 0000   Tube Feeding Supplement (Product) Protein Modular 02/04/22 0813   Tube Feeding Supplement Amount (mL) 75 02/03/22 0814   Tube Feeding Intake (mL) 160 ml 02/05/22 0000   Free Water Flush (mL) 90 mL 02/05/22 0000   Free Water Rate 30q4h 02/04/22 0813   Residual Volume (ml) 0 ml 02/04/22 0813   Output (mL) 0 ml 02/04/22 0813       [REMOVED] NG/OG/NJ/NE Tube Orogastric Center mouth (Removed)   Surrounding Skin Dry; Intact 02/07/22 2000   Securement device Yes 02/07/22 2000   Status Suction-low continuous 02/08/22 0817   Placement Verified by X-Ray (repeat);by External Catheter Length 02/07/22 2000   NG/OG/NJ/NE External Measurement (cm) 65 cm 02/08/22 1644   Drainage Appearance Bile;Green 02/08/22 0817   Tube Feeding Renal Formula 02/08/22 2005   Tube Feeding Status Stopped 02/09/22 0845   Rate/Schedule 40 mL/hr 02/08/22 2117   Tube Feeding Supplement (Product) Protein Modular 02/09/22 0914   Tube Feeding Supplement Amount (mL) 75 02/09/22 0914   Tube Feeding Intake (mL) 187 ml 02/09/22 0845   Free Water Flush (mL) 60 mL 02/09/22 0914   Free Water Rate 30Q4 02/08/22 2117   Residual Volume (ml) 25 ml 02/08/22 2117   Output (mL) 50 ml 02/08/22 0400       [REMOVED] NG/OG/NJ/NE Tube Orogastric 16 fr Center mouth (Removed)   Surrounding Skin Dry; Intact 02/16/22 0400   Securement device Yes 02/13/22 0817   Status Clamped 02/13/22 0817   Placement Verified by X-Ray (Initial) 02/13/22 0817   NG/OG/NJ/NE External Measurement (cm) 70 cm 02/13/22 0817   Rate/Schedule 40 mL/hr 02/16/22 0400   Tube Feeding Intake (mL) 247 ml 02/16/22 0500   Free Water Flush (mL) 30 mL 02/15/22 0434       [REMOVED] Urethral Catheter Temperature probe 16 fr (Removed)   Catheter Indications Need for fluid volume management of the critically ill patient in a critical care setting 02/06/22 0800   Site Assessment Urethral drainage;[place] 02/06/22 0800   Urine Color Gita;Bloody 02/06/22 0800   Urine Appearance Cloudy 02/06/22 0800   Output (mL) 30 mL 02/06/22 0600       Findings: Gigantic 3.5cm clean based duodenal bulb ulcer with pigmented spots, no active bleeding    Recommendation  1. Continue PPI BID x2m  2. Add carafate QID  3. Monitor Hgb  4. Observe for signs of bleeding  5. Start TFs per NG  6. Hold anticoagulation  7. Check stool for H pylori ag  8.  If rebleeds, would consider IR consult for coil embolization    Electronically signed by Yaw Stone MD on 2/17/2022 at 2:32 PM
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
## 2022-02-17 NOTE — FLOWSHEET NOTE
02/17/22 1155   Negative Pressure Wound Therapy Foot Left;Dorsal   Placement Date/Time: 02/02/22 1100   Pre-existing: No  Inserted by: Nader HUNTLEY  Location: Foot  Wound Location Orientation: Left;Dorsal   $ Standard NPWT >50 sq cm PER TX $ Yes   Wound Type Surgical   Unit Type Vac with Veraflo   Dressing Type Black foam   Number of pieces used 1   Cycle Continuous   Target Pressure (mmHg) 125   Intensity 1   Irrigation Solution Sodium chloride 0.9%   Instillation Volume  14 mL   Soak Time  3 minutes   Vac Frequency 2 hours   Canister changed? No   Dressing Status Changed   Dressing Changed Changed/New   Drainage Amount Small   Drainage Description Serous   Wound Assessment Granulation tissue;Red;Slough  (exposed bone and moist viable tendons)   Odor None           Dressing changed to left hand and vac dressing change to left dorsal foot. Left hand soaked in Hibliclens and warm water x 5 minutes. Iodoform in tunnels, Triad to fibrinous tissue, barrier wipe to intact skin, moist saline gauze, dry gauze and secured with roll gauze and tape. Tunnels with less depth. Improving. No Purulence noted.

## 2022-02-17 NOTE — PROGRESS NOTES
Per Dr. Drea Patel, ok to used NG since placed with scope. Per Dr. Leslie Brumfield, pt should be on nephro TF, will make dietician aware.

## 2022-02-17 NOTE — ANESTHESIA POSTPROCEDURE EVALUATION
Department of Anesthesiology  Postprocedure Note    Patient: Oscar Garcia  MRN: [id number]  YOB: 1975  Date of evaluation: 2/17/2022  Time:  1:18 PM     Procedure Summary     Date: 02/17/22 Room / Location: [place] [id number] 01 / [facility]    Anesthesia Start: 1230 Anesthesia Stop: 7037    Procedure: EGD W/ANES. (N/A ) Diagnosis: (ANEMIA)    Surgeons: Meghan Rubio MD Responsible Provider: Wilner Blanco MD    Anesthesia Type: TIVA ASA Status: 4          Anesthesia Type: TIVA    Deysi Phase I: Deysi Score: 10    Deysi Phase II:      Last vitals: Reviewed and per EMR flowsheets.      Vitals:    02/17/22 0900 02/17/22 1000 02/17/22 1100 02/17/22 1311   BP: (!) 145/87 (!) 140/87 (!) 140/86    Pulse: 105 103 113    Resp: 19 17 18    Temp:   98.8 °F (37.1 °C)    TempSrc:   Bladder    SpO2: 98% 97% 98%    Weight:       Height:    6' 1\" (1.854 m)     Anesthesia Post Evaluation    Patient location during evaluation: bedside  Patient participation: complete - patient participated  Level of consciousness: awake  Airway patency: patent  Nausea & Vomiting: no nausea  Complications: no  Cardiovascular status: hemodynamically stable  Respiratory status: acceptable  Hydration status: euvolemic

## 2022-02-18 NOTE — PROGRESS NOTES
Physical Therapy  Attempt: RN cleared patient for bed level activities. Patient awaiting blood transfusion for H/H 6.8/20.0. Patient received percocet at 15:20. Patient sleeping upon entrance to room,spouse present. Patient inconsistantly opened eyes to verbal commands. Patient was unable to keep eyes opened despite multiple attempts to rouse. Will continue to follow.      Jeffry Masterson, PT #[id number]

## 2022-02-18 NOTE — PROGRESS NOTES
Nephrology Progress Note   [email or web address]      This patient is a 42year old male whom we are following for RODRICK, HD dependent. Subjective:  He complains of significant rectal pain due to rectal tube    HD on 2/17 with 2.65 L UF, post weight 105.7 kg, needed 25% IV albumin once  HD on 2/15 w 2.7 l UF,post wt 103 kg   HD on 2/13 with 300 mL net UF, post weight 108.8 kg, patient received IV albumin x3 and 1 unit of prbc    EGD on 2/17 showed 3.5 cm duodenal bulb ulcer, NG tube placed, feeding to be started  Transferred to the ICU on 2/12/22 with worsening respiratory distress requiring intubation. Extubated 2/16/22    Last 24-hour urine output of 225 mL    ROS: No fever or chills. Social:No Family at bedside. Vitals:  /75   Pulse 110   Temp 100 °F (37.8 °C) (Bladder)   Resp 22   Ht 6' 1\" (1.854 m)   Wt 235 lb 14.3 oz (107 kg)   SpO2 99%   BMI 31.12 kg/m²   I/O last 3 completed shifts: In: 1506.1 [I.V.:85; Blood:293.8; NG/GT:293; IV Piggyback:384.3]  Out: 3750 [Urine:450; Stool:200]  No intake/output data recorded.     Physical Exam:  Gen: Intubated  Cardiovascular:  S1, S2 without m/r/g trace lower extremity edema  Respiratory: Decreased breath sounds  Abdomen:  soft, nt, nd,   Neuro/Psy: Sedated  Access: R IJ VasCath      Medications:   insulin glargine  15 Units SubCUTAneous BID    sucralfate  1 g Oral 4x Daily AC & HS    pantoprazole  40 mg IntraVENous BID    ampicillin-sulbactam  3,000 mg IntraVENous Q12H    dilTIAZem  30 mg Oral 4 times per day    b complex-C-folic acid  1 capsule Oral Daily    carvedilol  12.5 mg Oral BID WC    epoetin [place]-epbx  5,000 Units IntraVENous Once per day on Tue Thu Sat    insulin lispro  0-18 Units SubCUTAneous Q4H    sodium chloride flush  5-40 mL IntraVENous 2 times per day    povidone-iodine   Topical Daily    [Held by provider] heparin (porcine)  5,000 Units SubCUTAneous 3 times per day    Venelex   Topical BID    sodium chloride flush  5-40 mL IntraVENous 2 times per day         Labs:  Recent Labs     02/16/22  0534 02/16/22  1530 02/17/22  0410 02/17/22  0410 02/17/22  1850 02/18/22  0110 02/18/22  0530   WBC 11.0  --  12.5*  --   --   --  14.6*   HGB 7.1*   < > 7.4*   < > 7.5* 7.4* 7.2*   HCT 20.8*   < > 22.1*   < > 21.9* 20.9* 21.3*   MCV 89.6  --  89.7  --   --   --  89.5     --  268  --   --   --  296    < > = values in this interval not displayed. Recent Labs     02/16/22  0534 02/17/22  0410 02/18/22  0530   * 137 136   K 3.6 4.3 4.1   CL 94* 97* 98*   CO2 21 20* 22   GLUCOSE 165* 118* 189*   PHOS 5.0* 7.3* 4.5   BUN 50* 75* 50*   CREATININE 4.3* 5.5* 4.1*   LABGLOM 15* 11* 16*   GFRAA 18* 14* 19*            Assessment/      RODRICK likely related to prerenal factors in the setting of sepsis, use of diuretics and losartan contributing to multifactorial ATN. Louise Addy is not suggestive of staph associated glomerulonephritis.  Patient did not respond to IV fluids and developed acute pulmonary edema and renal replacement therapy initiated on 1/23/22        on iHD TThS.  Seems to be making more urine, non-oliguric.     -Acute pulmonary edema, improving with dialysis.     -S/P Cardio respiratory arrest       -Hyperkalemia     -Sepsis with MSSA bacteremia with left foot abscess s/p drainage, osteomyelitis, left hand I&D     -Anemia - prn prbc's     -Diabetes, uncontrolled     -Hypertension    -Hyperphosphatemia     Plan/     -HD on TTS schedule   Next 2/19, UF goal of 2 L   Use Nepro for tube feeds   Monitor signs of renal recovery  -Increase Retacrit to 10,000 units qHD.  -renal dose medications   -avoid nephrotoxins

## 2022-02-18 NOTE — PROGRESS NOTES
Rounds completed at this time with Dr. Elpidio Esposito. Per MD, place NG and start TF, since SLP recommended NPO. Per MD, can wait until EGD so that pt is sedated while it is being placed. Also, per Dr. Elpidio Esposito, pt can move out of ICU.

## 2022-02-18 NOTE — PROGRESS NOTES
AM assessment completed, see flow sheet. Pt is alert and oriented, but says that he is at [address] and that it is 2020. Vital signs are WNL. Respirations are even & easy. Pt does c/o pain to coccyx, will talk to MD regarding a pain med and reposition pt. Pt denies needs at this time. SR up x 2, and bed in low position. Call light is within reach.
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
## 2022-02-18 NOTE — PROGRESS NOTES
PROGRESS NOTE  S:46 yrs Patient  admitted on 1/22/2022 with Hyperglycemia [R73.9]  RODRICK (acute kidney injury) ([facility] [address] [N17.9]  Acute renal failure, unspecified acute renal failure type ([facility] [address] [N17.9]  Syncope, unspecified syncope type [R55] . Today he remains fatigued. He is passing dark BMs per [employer], and tolerating NG nutrition. Exam:   Vitals:    02/18/22 1300   BP: 110/66   Pulse: 97   Resp: 20   Temp:    SpO2: 98%      General appearance: appears stated age, cooperative, fatigued and syndromic appearance - chronically ill appearing  HEENT: Neck supple with midline trachea  Neck: no adenopathy and supple, symmetrical, trachea midline  Lungs: clear to auscultation bilaterally  Heart: regular rate and rhythm, S1, S2 normal, no murmur, click, rub or gallop  Abdomen: soft, non-tender; bowel sounds normal; no masses,  no organomegaly  Extremities: edema 1+ BLE     Medications: Reviewed    Labs:  CBC:   Recent Labs     02/16/22  0534 02/16/22  1530 02/17/22  0410 02/17/22  0410 02/17/22  1850 02/18/22  0110 02/18/22  0530   WBC 11.0  --  12.5*  --   --   --  14.6*   HGB 7.1*   < > 7.4*   < > 7.5* 7.4* 7.2*   HCT 20.8*   < > 22.1*   < > 21.9* 20.9* 21.3*   MCV 89.6  --  89.7  --   --   --  89.5     --  268  --   --   --  296    < > = values in this interval not displayed. BMP:   Recent Labs     02/16/22  0534 02/17/22  0410 02/18/22  0530   * 137 136   K 3.6 4.3 4.1   CL 94* 97* 98*   CO2 21 20* 22   PHOS 5.0* 7.3* 4.5   BUN 50* 75* 50*   CREATININE 4.3* 5.5* 4.1*     LIVER PROFILE: No results for input(s): AST, ALT, LIPASE, PROT, BILIDIR, BILITOT, ALKPHOS in the last 72 hours. Invalid input(s): AMYLASE,  ALB  PT/INR: No results for input(s): INR in the last 72 hours. Invalid input(s): PT      DATE OF PROCEDURE:  02/17/2022  PRE-PROCEDURE DIAGNOSES:  1.  GI bleed. 2.  Anemia. 3.  Melena. 4.  Heme-positive stool. POST-PROCEDURE DIAGNOSES:  1.   A 3.5-cm gigantic duodenal bulb ulcer with pigmented spots. 2.  No active bleeding. OPERATION PERFORMED:  EGD and NG tube placement. SURGEON:  Alfonzo Culp MD      Impression: 42year old male with a history of HTN, DM, A fib, and nonischemic cardiomyopathy admitted with septic shock secondary to MSSA bacteremia and L foot abscess complicated by acute respiratory failure and RODRICK. Hospitalization c/b cardiorespiratory arrest, RODRICK requiring HD and anemia. Acute on chronic anemia s/p 8U PRBCs with heme+stool. EGD showed large duodenal ulcer without active bleeding. Recommendation:  1. Continue supportive care  2. Monitor Hgb  3. Monitor and document output  4. Continue Pantoprazole 40 mg BID x 8 weeks  5. Continue Carafate QID x 8 weeks  6. Hold anticoagulation x 1-2 weeks  7. Await H pylori stool Ag results   8. Continue TFs per NG as tolerated  9. Advance diet as tolerated per SLP recommendations   10. Nephrology, pulmonology, and ID following   11. HD per nephrology  12.  Will follow       Arash Das PA-C  2:06 PM 2/18/2022

## 2022-02-18 NOTE — PROGRESS NOTES
[facility]  [address] [facility]  [address]  [place]  [address]  Tel: [phone]    September 20, 2017    Dionte BROWER Ogle  [address]  [place] [address]        Dear Mr. Mackey,    The following letter pertains to your most recent diagnostic tests:    -Your abdominal aortic aneurysm screen ultrasound did NOT show evidence for abdominal aortic aneurysm.       If you have any further questions or problems, please contact our office.      Sincerely,    Romeo Fonseca MD/[place],CMA    Results for orders placed or performed during the hospital encounter of 09/19/17    Aorta Medicare AAA Screening    Narrative    ULTRASOUND AORTA MEDICARE AAA SCREENING  9/19/2017 8:06 AM    HISTORY: Encounter for screening for cardiovascular disorders.    COMPARISON: None.    FINDINGS: No evidence for abdominal aortic aneurysm. Mild  calcification and atherosclerotic changes are noted throughout the  abdominal aorta and iliac arteries. The abdominal aortic measurements  are as follows:    Proximal (suprarenal) aorta: 2.7 cm AP x 2.6 cm transverse.  Mid (infrarenal) aorta: 2.0 cm AP x 1.9 cm transverse.  Distal aorta: 1.5 cm AP x 1.5 cm transverse.  Right common iliac artery: Not visualized secondary to overlying bowel  gas.  Left common iliac artery: 1.2 cm.       Impression    IMPRESSION: Unremarkable ultrasound examination of the abdominal  aorta. No evidence for abdominal aortic aneurysm.      HENOK CALVERT DO          Reassessment at this time. No major changes. Endo staff in room, setting up for EGD at this time.

## 2022-02-18 NOTE — OP NOTE
[address]                 [address] [address]                                OPERATIVE REPORT    PATIENT NAME: Morgan Vann                   :        1975  MED REC NO:   [id number]                          ROOM:  ACCOUNT NO:   [de-identified]                           ADMIT DATE: 2022  PROVIDER:     Adam Vickers MD    DATE OF PROCEDURE:  2022    PREPROCEDURE DIAGNOSES:  1.  GI bleed. 2.  Anemia. 3.  Melena. 4.  Heme-positive stool. POSTPROCEDURE DIAGNOSES:  1.  A 3.5-cm gigantic duodenal bulb ulcer with pigmented spots. 2.  No active bleeding. OPERATION PERFORMED:  EGD and NG tube placement. SURGEON:  Adam Vickers MD    MEDICATIONS:  MAC per anesthesia. PROCEDURE INDICATIONS:  This is a 44-year-old male with history of  hypertension, diabetes, atrial fibrillation, nonischemic cardiomyopathy  admitted with septic shock secondary to MSSA bacteremia and left foot  abscess complicated by acute respiratory failure and acute kidney  injury. His hospitalization was further complicated by  cardiorespiratory arrest, the acute kidney injury requiring hemodialysis  and ongoing anemia requiring a total of 8 units of packed RBC noted to have  melenic stool, which was confirmed to be heme positive. EGD is being performed  for diagnostic purposes. PROCEDURE IN DETAIL:  Informed consent obtained after discussing risks,  benefits, and alternatives. A full history and physical was performed. The patient was classified as ASA class III. Medications were given by  Anesthesia. Cardiopulmonary status was continuously monitored  throughout the procedure. The patient was placed in the left lateral  decubitus position. Once adequately sedated, a standard upper  gastroscope was inserted in the mouth and advanced under direct  visualization to second portion of the duodenum.   The entire mucosa of  the esophagus and stomach (retroflexed and forward views), duodenum  (bulb, sweep, and second portion) were examined carefully during  withdrawal.  The patient tolerated the procedure well without any  difficulties. FINDINGS:  ESOPHAGUS:  The entire esophagus appeared normal.  There was no evidence  of inflammation, ulcers, strictures, or Barbour's. There is minimal  inflammation consistent with reflux type esophagitis. The entire  stomach appeared normal both on forward and retroflexed views. No  active bleeding. DUODENUM:  There is a large almost circumferential 3.5-cm clean-based  ulcer in the duodenal bulb with pigmented spots. No active bleeding  identified. Second portion of the duodenum appeared normal.    SUMMARY:  1. Gigantic 3.5-cm clean-based duodenal bulb ulcer with pigmented  spots. 2.  No active bleeding. 3.  Mild esophagitis. 4.  Otherwise normal EGD. 5.  Successful NG tube placement after the procedure to encourage  nutrition. RECOMMENDATIONS:  1. Return the patient to floor for continuous medical care. 2.  Monitor hemoglobin and transfuse as necessary. 3.  High-dose PPI therapy 40 mg twice daily. 4.  Add Carafate to the regimen. 5.  Can start two feeds for NG tube as tolerated. 6.  Check H. pylori stool antigen. 7.  Hold anticoagulation for at least one to two weeks. 8.  If the patient starts to re-bleed consider IR consultation for GDA  coil embolization. 9.  We will follow the patient with you. EBL: <5mL    Reyes Ike, MD    D: 02/17/2022 17:12:13       T: 02/17/2022 23:17:08     [id number]  Job#: [id number]     Doc#: [id number]    CC:   MD Franchesca Alvarez MD

## 2022-02-18 NOTE — CARE COORDINATION
INTERDISCIPLINARY PLAN OF CARE CONFERENCE    Date/Time: 2/18/2022 10:17 AM  Completed by:  MARCO Skelton Case Management      Patient Name:  Kaur Collins  YOB: 1975  Admitting Diagnosis: Hyperglycemia [R73.9]  RODRICK (acute kidney injury) ([facility] [N17.9]  Acute renal failure, unspecified acute renal failure type ([facility] [N17.9]  Syncope, unspecified syncope type [R55]     Admit Date/Time:  1/22/2022  1:32 PM    Chart reviewed. Interdisciplinary team contacted or reviewed plan related to patient progress and discharge plans. Disciplines included Case Management, Nursing, and Dietitian. Current Status:Ongoing. PT/OT recommendation for discharge plan of care: SNF    Expected D/C Disposition:  Skilled nursing facility  Confirmed plan with patient and/or family Yes confirmed with: (name) pt, pt's wife Omari Pelayo and sister whom pt's wife stated could remain present. Discharge Plan Comments: Chart review completed. Pt remains in the ICU. Completed Interdisciplinary rounds with ICU staff; RN states pt needs a bed for HD. Received voicemail from Bob at [facility] stating [facility] has a spot pending it is for a bed Monday, Wednesday, Friday at 5:40am and needs to know if pt still needs a bed, along with how he will transport (stretcher vs car). Bob stated [address] [place] [address] Road is full. Bob states she will call [facility] to see if it is a bed and let writer know. Met with pt, pt's wife and sister at bedside. Provided pt's wife Omari Pelayo with the SNF list from [facility] . She is aware that the SNF's yesterday stated they don't accept pts insurance or don't have a bed. [place] aware of the HD location and stated this would be okay if needed. Omari Pelayo requested referrals to [facility] and [address] [place] [address]. Omari Pelayo stated pt has been vaccinated times 2 but no booster. Omari Pelayo states she will call with more SNF choices. She is aware the MD at the SNF would be over his care once at a SNF.  No further questions expressed. Spoke with Belinda Paz at [place] [address] who states they are not taking any HD pt's. Spoke with Britni Monk at [facility] who stated they don't accept pt's under the age of 54. Updated pt's wife Yari Vasquez via phone call. She provided writer with the following SNF choices 1. [facility] 2. [facility] 3. [facility] 4. [facility] 5. [facility] 6. [facility]. Message left for Davina at [address] will continue to follow and assist. Please notify CM if needs or concerns arise.       Home O2 in place on admit: No

## 2022-02-18 NOTE — PROGRESS NOTES
[facility] Infectious Disease Progress Note      Kaur Collins     : 1975    DATE OF VISIT:  2022  DATE OF ADMISSION:  2022       Subjective:     Kaur Collins is a 42 y.o. male whom I've been seeing for a deep left hand and left foot infection. Since I last saw him, he continues to do rather well overall. Had an EGD yesterday, large DU found. Having some perirectal discomfort right now (hemorrhoids, he says). No F/C/D. Has an NG tube in, but is taking some bites of lemon ice, etc, with the speech pathologist. Still some dark diarrheal output. Urine output doing ok. HD tomorrow, I believe. VAC in place on the left foot. Still very weak, not SOB at the moment, some cough at times. Mr. 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     Current Facility-Administered Medications: oxyCODONE-acetaminophen (PERCOCET) 5-325 MG per tablet 1 tablet, 1 tablet, Oral, Q4H PRN  insulin glargine (LANTUS) injection vial 15 Units, 15 Units, SubCUTAneous, BID  sucralfate (CARAFATE) tablet 1 g, 1 g, Oral, 4x Daily AC & HS  pantoprazole (PROTONIX) injection 40 mg, 40 mg, IntraVENous, BID  0.9 % sodium chloride infusion, , IntraVENous, PRN  ampicillin-sulbactam (UNASYN) 3000 mg ivpb minibag, 3,000 mg, IntraVENous, Q12H  dilTIAZem (CARDIZEM) tablet 30 mg, 30 mg, Oral, 4 times per day  b complex-C-folic acid (NEPHROCAPS) capsule 1 mg, 1 capsule, Oral, Daily  carvedilol (COREG) tablet 12.5 mg, 12.5 mg, Oral, BID WC  epoetin angeles-epbx (RETACRIT) injection 5,000 Units, 5,000 Units, IntraVENous, Once per day on Tue Thu Sat  insulin lispro (HUMALOG) injection vial 0-18 Units, 0-18 Units, SubCUTAneous, Q4H  sodium chloride flush 0.9 % injection 5-40 mL, 5-40 mL, IntraVENous, 2 times per day  sodium chloride flush 0.9 % injection 5-40 mL, 5-40 mL, IntraVENous, PRN  0.9 % sodium chloride infusion, 25 mL, IntraVENous, PRN  povidone-iodine (BETADINE) 10 % external solution, , Topical, Daily  [Held by provider] heparin (porcine) injection 5,000 Units, 5,000 Units, SubCUTAneous, 3 times per day  heparin (porcine) injection 3,000 Units, 3,000 Units, IntraVENous, PRN  sodium chloride 0.9 % irrigation 1,000 mL, 1,000 mL, Irrigation, Continuous PRN  Venelex ointment, , Topical, BID  albumin human 25 % IV solution 12.5 g, 12.5 g, IntraVENous, PRN  heparin (porcine) injection 2,600 Units, 2,600 Units, IntraCATHeter, PRN  sodium chloride flush 0.9 % injection 5-40 mL, 5-40 mL, IntraVENous, 2 times per day  sodium chloride flush 0.9 % injection 5-40 mL, 5-40 mL, IntraVENous, PRN  0.9 % sodium chloride infusion, 25 mL, IntraVENous, PRN  acetaminophen (TYLENOL) tablet 650 mg, 650 mg, Oral, Q6H PRN **OR** acetaminophen (TYLENOL) suppository 650 mg, 650 mg, Rectal, Q6H PRN  glucose (GLUTOSE) 40 % oral gel 15 g, 15 g, Oral, PRN  glucagon (rDNA) injection 1 mg, 1 mg, IntraMUSCular, PRN  dextrose 5 % solution, 100 mL/hr, IntraVENous, PRN  dextrose bolus (hypoglycemia) 10% 125 mL, 125 mL, IntraVENous, PRN **OR** dextrose bolus (hypoglycemia) 10% 250 mL, 250 mL, IntraVENous, PRN     This is day 16 of Enterococcal therapy, day 27 of MSSA therapy, POD 17 for the foot, POD 13 for the hand. Allergies: Januvia [sitagliptin], Metformin and related, Vancomycin, and Mustard oil [allyl isothiocyanate]    Pertinent items from the review of systems are discussed in the HPI; the remainder of the ROS was reviewed and is negative. Objective:     Vital signs over the last 24 hours:  Temp  Av.4 °F (37.4 °C)  Min: 98.8 °F (37.1 °C)  Max: 100 °F (37.8 °C)  Pulse  Av.9  Min: 104  Max: 211  Systolic (01QFF), ZEK:672 , Min:123 , WRF:730   Diastolic (46TYB), JQB:61, Min:80, Max:105  Resp  Av.2  Min: 16  Max: 23  SpO2  Av.1 %  Min: 94 %  Max: 100 %    Constitutional:  well-developed, well-nourished, alert, weak and fatigued  Neuropsych: more alert, more interactive today, speaking a bit more, not anxious or agitated  Eyes:  pupils equal, round, reactive to light; sclerae anicteric, conjunctivae pale  ENT:  atraumatic; no labial ulcers; no thrush; NGT in place  Resp:  a bit clearer today, decreased at the bases  Cardiovascular:  heart regular, no murmur; generally mild extremity edema; no IV phlebitis; right PICC in place, and a right IJ HD CRRT line, exit sites benign  GI:  abdomen soft, NT, distended, slightly hyperactive bowel sounds today, no palpable masses or organomegaly; rectal tube in place, with dark diarrhea  : Munoz in place, increasing amount of clearer yellow urine now  Musculoskeletal:  no clubbing, cyanosis or petechiae; extremities with no gross effusions or acute arthritis  Skin: warm, dry, normal turgor; no acute rash, no peripheral stigmata of endocarditis.  Scalp wound smaller, drier, not inflamed, a bit of superficial-seeming eschar. I did not examine his other wounds today  ______________________________    Recent Labs     22  0530 22  0110 22  1850 22  0410 22  0410 22  1530 22  0534   WBC 14.6*  --   --   --  12.5*  --  11.0   HGB 7.2* 7.4* 7.5*   < > 7.4*   < > 7.1*   HCT 21.3* 20.9* 21.9*   < > 22.1*   < > 20.8*   MCV 89.5  --   --   --  89.7  --  89.6     --   --   --  268  --  248    < > = values in this interval not displayed. Lab Results   Component Value Date    CREATININE 4.1 (H) 02/18/2022     Lab Results   Component Value Date    LABALBU 3.2 (L) 02/18/2022     Lab Results   Component Value Date    ALT <5 (L) 02/10/2022    AST 8 (L) 02/10/2022     (H) 01/31/2022    ALKPHOS 217 (H) 02/10/2022    BILITOT 0.5 02/10/2022      Lab Results   Component Value Date    LABA1C 10.6 01/23/2022     Other recent pertinent labs: Anion gap 16. Glucoses in the 100s. ANC 12.6k, 1 met, 1 myelocyte.   ______________________________    Recent pertinent micro results:  Nothing new this week.  ______________________________    Recent imaging results (last 7 days):     XR CHEST PORTABLE    Result Date: 2/13/2022  Perihilar opacities in the left lung worse than right are redemonstrated. May be developing a pneumatocele in the right mid chest. Endotracheal tube and nasogastric tube are acceptable. XR CHEST PORTABLE    Result Date: 2/12/2022  Endotracheal tube and nasogastric tube have been removed. New right jugular catheter with the distal tip is poorly defined. May be over the inferior right atrium and consider repeat study to better assess the tip. Patchy opacities in the left lung more than right are redemonstrated. XR CHEST 1 VIEW    Result Date: 2/16/2022  Bilateral airspace disease greater left parahilar and infrahilar primary concern is pneumonia. Rounded thick-walled lucent lesion in the right mid lung possible focal abscess. As above, other considerations include a pulmonary bleb or pneumatocele with inflammatory margins and unlikely necrotic neoplasm. . CT scan with contrast recommended. XR CHEST 1 VIEW    Result Date: 2/15/2022  ET, NG, and 2 central venous catheters are stable. Left greater than right basilar opacification is redemonstrated. Lungs are hypoinflated. XR CHEST 1 VIEW    Result Date: 2/14/2022  Low volume study with diffuse bilateral opacity, increased at the left base, for which some considerations include edema, pneumonia, and atelectasis.       Assessment:     Patient Active Problem List   Diagnosis Code    Hypertension I10    Uncontrolled type 2 diabetes mellitus with diabetic polyneuropathy ([facility]) E11.42, E11.65    Cardiomyopathy ([facility] [address] [address] I42.9    Mixed hyperlipidemia E78.2    Allergic rhinitis J30.9    Osteoarthritis M19.90    Acute osteomyelitis of left foot ([facility] M86.172    RODRICK (acute kidney injury) ([facility] [address] N17.9    MSSA bacteremia R78.81, B95.61    Third degree burn of left hand T23.302A    Acute hypoxemic respiratory failure ([facility]) J96.01    Cardiopulmonary arrest ([facility]) I46.9    Hypertriglyceridemia E78.1    Staphylococcal arthritis of left foot ([facility]) M00.072    Antibiotic-associated diarrhea K52.1, T36.95XA    Acute encephalopathy G93.40    Infective tenosynovitis of extensor tendons of left hand M65.142    Enterococcal infection A49.1    Abscess of left hand L02.512    Acute osteomyelitis of left hand ([facility]) M86.142    Pressure injury of deep tissue of sacral region L89.156    Severe protein-calorie malnutrition ([facility]) E43    Paroxysmal atrial fibrillation ([facility]) I48.0    VAP (ventilator-associated pneumonia) ([facility]) J95.851    Abnormal chest x-ray R93.89    Upper GI bleeding K92.2    Duodenal ulcer K26.9     Assessment of today's active condition(s):      --          Background of uncontrolled DM2, neuropathy, no known PAD, multiple prior diabetic foot ulcers, infections, surgeries. Most recent wounds were at the left 1st and 2nd ray, but they had been healed for just about a year.      --          Admission this time with septic shock related primarily to deep MSSA foot infection (cellulitis, abscess, septic arthritis, acute osteo), with acute renal failure, lactic acidosis, then cardiac arrest, resuscitation, acute respiratory failure, rapid Afib. Shock resolved, respiratory failure resolved (I think perhaps mostly due to CHF / fluid overload after cardiac arrest).    --          Ongoing ARF, on intermittent HD, but U/O improving this past week.      --          Third degree burn of left hand, with purulence beneath the lysing eschar seen to be running along extensor tendons - MRI and clinical exams c/w soft tissue abscess, septic tenosynovitis, possible acute septic arthritis at the 3rd MCPJ, with adjacent acute metacarpal and phalangeal osteo. Definitely need to treat the Enterococcus, with it isolated from OR Cxs. Still some purulence along tendon tracks several days ago, but definitely less.      --          SIRS / sepsis finally resolved, after aggressive OR treatment of left foot and left hand infections.      --          Improving encephalopathy, likely multifactorial (cardiac arrest, ICU stay, sedatives, sepsis, renal failure, etc). Also wondering if he might not have a significant degree of critical-illness myopathy and/or neuropathy. Peripheral strength does seem better today than late last week.      --          Colonization with Candida, not surprising given DM, prior steroids, extensive Abx Rx.      --          Unstageable pressure ulcer of sacrum -- conservative Rx for now, with Triad; also a small occipital wound, which should do fine with local care.      --          Then this past weekend a setback with difficulty in clearing secretions, hypoxemia, worsening mental status, rapid Afib, reintubation. I think this was more of an issue of retained secretions and perhaps aspiration pneumonitis, as opposed to a true invasive pneumonia.  Respiratory function seemed to improve quickly after bronch and supportive care, nothing clearly new on CXR, FIO2 has come down, WBC quickly back down to normal, and nothing on bronch wash / BAL. Now extubated, and looking better than he has recently.      --          Persistent / recurrent

## 2022-02-18 NOTE — PROGRESS NOTES
PATIENT RESTING EASY, RESPIRATIONS EASY ON ROOM AIR, NO SIGNS OF SHORT OF BREATH, O2 SATS REMAIN >95%. PATIENT IS ALERT AND RESPONDS APPROPRIATELY, PICC LINE RIGHT ARM PATENT. BANEGAS CATHETER COLLECTING SONU URINE. SR/ST ON TELE. REMAINED AFEBRILE THROUGHOUT THE NIGHT. VITAL SIGNS WNL. SEE FLOW SHEET.

## 2022-02-18 NOTE — PROGRESS NOTES
AM assessment completed, see flow sheet. Pt is alert and oriented to place, said that it was 2021, and was unsure why exactly he was in the hospital.  Explained to pt why he was here and then he was able to recall. Vital signs are WNL. Respirations are even & easy. NC not in nares at this time and sp02 100%. NC removed. No complaints voiced. Pt denies needs at this time. SR up x 2, and bed in low position. Call light is within reach.

## 2022-02-18 NOTE — PROGRESS NOTES
Speech Language Pathology  Facility/Department: [facility] ICU  Dysphagia Daily Treatment Note    Recommendations:   Solid Consistency: NPO w/low volume pleasure feeds puree SLP/RN only  Liquid Consistency: NPO w/low volume pleasure feeds thin liquids SLP/RN only  Medication: Meds via alt means of nutrition  Risk management: Control risk factors for aspiration PNA by completing oral care 3-4x/day and increasing physical mobility as is medically feasible. If the patient exhibits s/s of aspiration and/or worsening respiratory status, hold pleasure feeds and contact SLP. NAME: Fabian Way  : 1975  MRN: [id number]    Patient Diagnosis(es):   Patient Active Problem List    Diagnosis Date Noted    Duodenal ulcer 2022    Upper GI bleeding     Abnormal chest x-ray     VAP (ventilator-associated pneumonia) (HCC)     Paroxysmal atrial fibrillation (HCC)     Severe protein-calorie malnutrition ([address] 2022    Acute osteomyelitis of left hand ([address] 2022    Pressure injury of deep tissue of sacral region 2022    Abscess of left hand     Infective tenosynovitis of extensor tendons of left hand 2022    Enterococcal infection 2022    Staphylococcal arthritis of left foot ([address] 2022    Antibiotic-associated diarrhea 2022    Acute encephalopathy     Hypertriglyceridemia     MSSA bacteremia 2022    Third degree burn of left hand 2022    Acute hypoxemic respiratory failure ([address]     Cardiopulmonary arrest ([address]     RODRICK (acute kidney injury) ([address] 2022    Acute osteomyelitis of left foot ([address] 10/26/2020    Allergic rhinitis 2020    Osteoarthritis     Mixed hyperlipidemia 2016    Cardiomyopathy ([address] 2014    Hypertension     Uncontrolled type 2 diabetes mellitus with diabetic polyneuropathy (HCC)      Allergies:    Allergies   Allergen Reactions    Januvia [Sitagliptin] Nausea Only     Has taken metformin without side effects in the past.  Nausea with Janumet in the past.     Metformin And Related      GI Upset    Vancomycin      Pt had red face, swelling itching of eyelids, sore throat after receiving vancmomyin and cefepime. I think this was a histamine releasing reaction from vancomycin most likely. The cefepime was switched to Zosyn and patient had no reaction to Zosyn    Mustard Nathan Gallardosmith Isothiocyanate] Swelling and Rash     Onset Date: 02/17/22  Subjective: Pt seen at bedside with RN permission     Pain: The patient does not complain of pain     Current Diet: Diet NPO  ADULT TUBE FEEDING; Nasogastric; Renal Formula; Continuous; 20; Yes; 20; Other (specify); every 2 hours; 40; 30; Q 4 hours; Protein; Give P2Go TWICE daily via feeding tube. Diet Tolerance:  Pt is NPO at this time    Dysphagia Treatment and Impressions:   Pt seen in room at bedside with RN permission   Chart Review/Interview: Pt oriented and alert. Pt able to follow SLP commands and respond to yes/ no questions. Pt excited to complete PO trials. Pt attempted phrase-level verbalizations. Difficult to hear/understand 2/2 to vocal quality remaining aphonic. Pt is s/p EGD (02/17) with noted esophagitis and \"gigantic\" 3.5-cm clean-based ulcer in the duodenal bulb. NG tube placed during EGD.  Respiratory Status: Pt with SPO2% of 99 on RA with RR of 22   Liquid PO Trials:   · Ice: no anterior bolus loss , suspect functional A-P bolus transit, swallow timing subjectively appears timely, no clinical s/s of aspiration and vitals stable. Pt noted to forcefully expel phlegm from throat. Did not appear to present as a response to airway invasion. Suspect ice chip trials loosened dried pharyngeal / laryngeal secretions and allowed pt to expel secretions.    IDDSI 0 Thin:  Assessed via tsp (5cc): no anterior bolus loss, suspect functional A-P bolus transit, swallow timing subjectively appears timely, no clinical s/s of aspiration and vitals stable  IDDSI 0 Thin: Assessed via cup: no anterior bolus loss , suspect functional A-P bolus transit, swallow timing subjectively appears timely, vitals stable. Pt noted with immediate weak cough after the swallow on 1 trial- suspect r/t large bolus size.  IDDSI 0 Thin: Assessed via straw:  no anterior bolus loss , suspect functional A-P bolus transit, swallow timing subjectively appears timely, no clinical s/s of aspiration and vitals stable      Solid PO Trials   IDDSI 4 Puree:   no anterior bolus loss , suspect functional A-P bolus transit, swallow timing subjectively appears timely, oral clearance grossly WFL, no clinical s/s of aspiration and vitals stable   Other PO Trials:    Assessed lemon shaved ice: Used to enhance sensory awareness via cold and sour bolus (thermal-gustatory stim): no anterior bolus loss , suspect functional A-P bolus transit, swallow timing subjectively appears timely, no clinical s/s of aspiration and vitals stable    Education: SLP edu pt re: Role of SLP, Rationale for dysphagia tx, Recommended compensatory strategies, Aspiration precautions, POC, Evidenced based practice for current recommendations and treatment, Anatomical components of swallow structures as they pertain to airway protection, Rationale for NPO status and Importance of oral care to reduce adverse affects in the event of aspiration. Pt verbalized understanding, would benefit from ongoing education and RN aware of recommendations   Assessment: Pt noted with improved tolerance to PO trials at bedside this date. Assessed with thin liquids via tsp, cup, and straw, puree, lemon ice, and ice chips with minimal clinical s/s of aspiration or pulmonary compromise. Clinician recommends pleasure feeds to allow pt to regain laryngopharyngeal strength, decrease muscle fatigue and to prevent disuse atrophy.     Recommendations: Continue NPO with low volume pleasure feeds of puree and thin liquids; meds via alt means of nutrition (NG).  Risk Management: Control risk factors for aspiration PNA by completing oral care 3-4x/day and increasing physical mobility as is medically feasible. If the patient exhibits s/s of aspiration and/or worsening respiratory status, hold pleasure feeds and contact SLP. Dysphagia Goals:  Short Term Goals:  Timeframe for Short Term Goals: (5 days 02/21/22)  Goal 1: The patient will tolerate repeat BSE as able  2/18/2022 : Goal addressed, see above. Ongoing, progressing. Goal 2: The patient/caregiver will demonstrate understanding of compensatory swallow strategies, for improved swallow safety  2/18/2022 : Goal addressed, see above. Ongoing, progressing. Goal 4: The patient will tolerate instrumental assessment when able   2/18/2022 : Will address once pt is consistently tolerating pleasure feeds at bedside. Ongoing, progressing.     Long Term Goals:   Timeframe for Long Term Goals: (7 days 02/23/22)  Goal 1: The patient will tolerate least restrictive diet with no clinical s/s of aspiration or worsening respiratory/pulmonary status  2/18/2022 : Ongoing, progressing. Speech/Language/Cog Goals: N/A    Recommendations:   Solid Consistency: NPO w/low volume pleasure feeds puree SLP/RN only  Liquid Consistency: NPO w/low volume pleasure feeds thin liquids SLP/RN only  Medication: Meds via alt means of nutrition    Patient/Family/Caregiver Education: See above    Compensatory Strategies: See above    Plan:    Continued Dysphagia treatment with goals per plan of care. Discharge Recommendations: TBD    If pt discharges from hospital prior to Speech/Swallowing discharge, this note serves as tx and discharge summary.      Total Treatment Time / Charges     Time in Time out Total Time / units   Cognitive Tx         Speech Tx      Dysphagia Tx 1131 1200 31 minutes/ 1 units      Signature:  Yogesh Walter   SLP  Clinician     Co-Signing Supervisor:  Elenita Mancuso M.A., [id number] [facility] #[id number]  Speech-Language Pathologist  Phone: [phone]
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
## 2022-02-19 NOTE — PROGRESS NOTES
Nephrology Progress Note   [facility]      This patient is a 42year old male whom we are following for RODRICK, HD dependent. Subjective:  Patient is sleeping, has low-grade fevers    HD on 2/19 with 3 L UF post weight 102.6 kg  HD on 2/17 with 2.65 L UF, post weight 105.7 kg, needed 25% IV albumin once  HD on 2/15 w 2.7 l UF,post wt 103 kg   HD on 2/13 with 300 mL net UF, post weight 108.8 kg, patient received IV albumin x3 and 1 unit of prbc    EGD on 2/17 showed 3.5 cm duodenal bulb ulcer, NG tube placed, feeding to be started  Transferred to the ICU on 2/12/22 with worsening respiratory distress requiring intubation. Extubated 2/16/22    Last 24-hour urine output of 225 mL    ROS: No fever or chills. Social:No Family at bedside. Vitals:  /75   Pulse 98   Temp 100.5 °F (38.1 °C) (Bladder)   Resp 26   Ht 6' 1\" (1.854 m)   Wt 226 lb 3.1 oz (102.6 kg)   SpO2 93%   BMI 29.84 kg/m²   I/O last 3 completed shifts: In: 1962.3 [Blood:300; NG/GT:1282; IV Piggyback:380.3]  Out: 500 [Urine:300; IISSJ:008]  I/O this shift:   In: 0   Out: 3450     Physical Exam:  Gen: Resting  Cardiovascular:  S1, S2 without m/r/g trace lower extremity edema  Respiratory: Decreased breath sounds  Abdomen:  soft, nt, nd,   Neuro/Psy: Off sedation  Access: R IJ VasCath      Medications:   epoetin angeles-epbx  10,000 Units IntraVENous Once per day on Tue Thu Sat    insulin glargine  15 Units SubCUTAneous BID    sucralfate  1 g Oral 4x Daily AC & HS    pantoprazole  40 mg IntraVENous BID    ampicillin-sulbactam  3,000 mg IntraVENous Q12H    dilTIAZem  30 mg Oral 4 times per day    b complex-C-folic acid  1 capsule Oral Daily    carvedilol  12.5 mg Oral BID WC    insulin lispro  0-18 Units SubCUTAneous Q4H    sodium chloride flush  5-40 mL IntraVENous 2 times per day    povidone-iodine   Topical Daily    [Held by provider] heparin (porcine)  5,000 Units SubCUTAneous 3 times per day    Venelex   Topical BID    sodium chloride flush  5-40 mL IntraVENous 2 times per day         Labs:  Recent Labs     02/17/22  0410 02/17/22  1850 02/18/22  0530 02/18/22  1416 02/18/22  2104 02/19/22  0435 02/19/22  1330   WBC 12.5*  --  14.6*  --   --  13.4*  --    HGB 7.4*   < > 7.2*   < > 7.7* 7.5* 8.1*   HCT 22.1*   < > 21.3*   < > 23.7* 21.8* 23.7*   MCV 89.7  --  89.5  --   --  89.1  --      --  296  --   --  285  --     < > = values in this interval not displayed. Recent Labs     02/17/22  0410 02/18/22  0530 02/19/22  0435    136 136   K 4.3 4.1 4.4   CL 97* 98* 99   CO2 20* 22 20*   GLUCOSE 118* 189* 207*   PHOS 7.3* 4.5 5.4*   BUN 75* 50* 70*   CREATININE 5.5* 4.1* 5.9*   LABGLOM 11* 16* 10*   GFRAA 14* 19* 13*            Assessment/      RODRICK likely related to prerenal factors in the setting of sepsis, use of diuretics and losartan contributing to multifactorial ATN. Elwyn Hammer is not suggestive of staph associated glomerulonephritis.  Patient did not respond to IV fluids and developed acute pulmonary edema and renal replacement therapy initiated on 1/23/22        on iHD TThS.  Seems to be making more urine, non-oliguric.     -Acute pulmonary edema, improving with dialysis.     -S/P Cardio respiratory arrest       -Hyperkalemia     -Sepsis with MSSA bacteremia with left foot abscess s/p drainage, osteomyelitis, left hand I&D   Low-grade fever on 2/19     -Anemia - prn prbc's     -Diabetes, uncontrolled     -Hypertension    -Hyperphosphatemia     Plan/     -HD on TTS schedule   Use Nepro for tube feeds   Monitor signs of renal recovery  -Retacrit to 10,000 units qHD.  -renal dose medications   -avoid nephrotoxins

## 2022-02-19 NOTE — FLOWSHEET NOTE
02/19/22 0800 02/19/22 1200   Treatment   Time On 0822  --    Time Off  --  1154   Vital Signs   /79 139/83   Temp 98.5 °F (36.9 °C)  --    Pulse 92 105   Resp 21 21   SpO2 98 % 100 %   Weight 232 lb 12.9 oz (105.6 kg) 226 lb 3.1 oz (102.6 kg)   Weight Method Actual;Bed scale Estimated   Percent Weight Change 1.44 -2.84   Treatment Initiation   Dialyze Hours 3.5  --    Post-Hemodialysis Assessment   Rinseback Volume (ml)  --  400 ml   Total Liters Processed (l/min)  --  60.8 l/min   Dialyzer Clearance  --  Lightly streaked   Duration of Treatment (minutes)  --  210 minutes   Hemodialysis Output (ml)  --  3450 ml   NET Removed (ml)  --  3050 ml   Tolerated Treatment  --  Good   Treatment time: 3.5 Hours  Net UF: 3050 ML    Pre weight: 105.6 Kg  Post weight: 102.6 kg  EDW:   TBD  kg    Access used: [facility]  Access function: Good with [id number]  ml/min    Medications or blood products given: Retacrit 10,000    Regular outpatient schedule: New RODRICK,     Summary of response to treatment: Tolerated tx without any difficulty, VSS throughout tx. Crit line showed rapid decline in profile C first hour then leveled off to profile A most of  remainder of tx. Beginnging HCT 24.9, ending HCT   27.6     . No  Refill present. 27.8 minus    27.6    equals    . 2    Max %BV tolerated      10.4      Report to Ehsan Rizzo RN, copy of treatment record placed in hard chart for scan into the EMR.
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
## 2022-02-19 NOTE — PLAN OF CARE
Problem: Falls - Risk of:  Goal: Will remain free from falls  Description: Will remain free from falls  Outcome: Ongoing  Goal: Absence of physical injury  Description: Absence of physical injury  Outcome: Ongoing     Problem: Skin Integrity:  Goal: Will show no infection signs and symptoms  Description: Will show no infection signs and symptoms  Outcome: Ongoing  Goal: Absence of new skin breakdown  Description: Absence of new skin breakdown  Outcome: Ongoing     Problem: Confusion - Acute:  Goal: Absence of continued neurological deterioration signs and symptoms  Description: Absence of continued neurological deterioration signs and symptoms  Outcome: Ongoing  Goal: Mental status will be restored to baseline  Description: Mental status will be restored to baseline  Outcome: Ongoing     Problem: Discharge Planning:  Goal: Ability to perform activities of daily living will improve  Description: Ability to perform activities of daily living will improve  Outcome: Ongoing  Goal: Participates in care planning  Description: Participates in care planning  Outcome: Ongoing     Problem: Injury - Risk of, Physical Injury:  Goal: Will remain free from falls  Description: Will remain free from falls  Outcome: Ongoing  Goal: Absence of physical injury  Description: Absence of physical injury  Outcome: Ongoing     Problem: Mood - Altered:  Goal: Mood stable  Description: Mood stable  Outcome: Ongoing  Goal: Absence of abusive behavior  Description: Absence of abusive behavior  Outcome: Ongoing  Goal: Verbalizations of feeling emotionally comfortable while being cared for will increase  Description: Verbalizations of feeling emotionally comfortable while being cared for will increase  Outcome: Ongoing     Problem: Psychomotor Activity - Altered:  Goal: Absence of psychomotor disturbance signs and symptoms  Description: Absence of psychomotor disturbance signs and symptoms  Outcome: Ongoing     Problem: Sensory Perception - Impaired:  Goal: Demonstrations of improved sensory functioning will increase  Description: Demonstrations of improved sensory functioning will increase  Outcome: Ongoing  Goal: Decrease in sensory misperception frequency  Description: Decrease in sensory misperception frequency  Outcome: Ongoing  Goal: Able to refrain from responding to false sensory perceptions  Description: Able to refrain from responding to false sensory perceptions  Outcome: Ongoing  Goal: Demonstrates accurate environmental perceptions  Description: Demonstrates accurate environmental perceptions  Outcome: Ongoing  Goal: Able to distinguish between reality-based and nonreality-based thinking  Description: Able to distinguish between reality-based and nonreality-based thinking  Outcome: Ongoing  Goal: Able to interrupt nonreality-based thinking  Description: Able to interrupt nonreality-based thinking  Outcome: Ongoing     Problem: Sleep Pattern Disturbance:  Goal: Appears well-rested  Description: Appears well-rested  Outcome: Ongoing     Problem: Nutrition  Goal: Optimal nutrition therapy  Outcome: Ongoing  Goal: Understanding of nutritional guidelines  Outcome: Ongoing     Problem: Coping:  Goal: Ability to cope will improve  Description: Ability to cope will improve  Outcome: Ongoing   Pt resting in bed, on RA, denies SOB, TF infusing, wound vac patent, FMS and rea patent, pt bathed and repositioned for comfort, pt voices no concerns.

## 2022-02-20 NOTE — PROGRESS NOTES
Nephrology Progress Note   [employer]      This patient is a 42year old male whom we are following for RODRICK, HD dependent. Subjective:  Patient is sleeping, has low-grade fevers    HD on 2/19 with 3 L UF post weight 102.6 kg  HD on 2/17 with 2.65 L UF, post weight 105.7 kg, needed 25% IV albumin once  HD on 2/15 w 2.7 l UF,post wt 103 kg   HD on 2/13 with 300 mL net UF, post weight 108.8 kg, patient received IV albumin x3 and 1 unit of prbc    EGD on 2/17 showed 3.5 cm duodenal bulb ulcer, NG tube placed, feeding to be started  Transferred to the ICU on 2/12/22 with worsening respiratory distress requiring intubation. Extubated 2/16/22    Last 24-hour urine output of 225 mL    ROS: No fever or chills. Social: Family at bedside. Vitals:  /63   Pulse 88   Temp 100.4 °F (38 °C) (Bladder)   Resp 17   Ht 6' 1\" (1.854 m)   Wt 221 lb 1.9 oz (100.3 kg)   SpO2 96%   BMI 29.17 kg/m²   I/O last 3 completed shifts:   In: 2227.2 [I.V.:450; TZ/XS:7086; IV Piggyback:283.2]  Out: 4100 [Urine:450; Stool:200]  I/O this shift:  In: 202 [NG/GT:202]  Out: -     Physical Exam:  Gen: Resting  Cardiovascular:  S1, S2 without m/r/g trace lower extremity edema  Respiratory: Decreased breath sounds  Abdomen:  soft, nt, nd,   Neuro/Psy: Off sedation  Access: R IJ VasCath      Medications:   epoetin ang[place]-epbx  10,000 Units IntraVENous Once per day on Tue Thu Sat    insulin glargine  15 Units SubCUTAneous BID    sucralfate  1 g Oral 4x Daily AC & HS    pantoprazole  40 mg IntraVENous BID    ampicillin-sulbactam  3,000 mg IntraVENous Q12H    dilTIAZem  30 mg Oral 4 times per day    b complex-C-folic acid  1 capsule Oral Daily    carvedilol  12.5 mg Oral BID WC    insulin lispro  0-18 Units SubCUTAneous Q4H    sodium chloride flush  5-40 mL IntraVENous 2 times per day    povidone-iodine   Topical Daily    [Held by provider] heparin (porcine)  5,000 Units SubCUTAneous 3 times per day    Venelex   Topical BID  sodium chloride flush  5-40 mL IntraVENous 2 times per day         Labs:  Recent Labs     02/18/22  0530 02/18/22  1416 02/19/22  0435 02/19/22  0435 02/19/22  1330 02/19/22  2047 02/20/22  0408   WBC 14.6*  --  13.4*  --   --   --  12.7*   HGB 7.2*   < > 7.5*   < > 8.1* 7.7* 7.6*   HCT 21.3*   < > 21.8*   < > 23.7* 22.3* 22.4*   MCV 89.5  --  89.1  --   --   --  89.3     --  285  --   --   --  315    < > = values in this interval not displayed. Recent Labs     02/18/22  0530 02/19/22  0435 02/20/22  0408    136 134*   K 4.1 4.4 3.8   CL 98* 99 96*   CO2 22 20* 23   GLUCOSE 189* 207* 198*   PHOS 4.5 5.4* 4.0   BUN 50* 70* 48*   CREATININE 4.1* 5.9* 4.3*   LABGLOM 16* 10* 15*   GFRAA 19* 13* 18*            Assessment/      RODRICK likely related to prerenal factors in the setting of sepsis, use of diuretics and losartan contributing to multifactorial ATN. Saldivar Cherise is not suggestive of staph associated glomerulonephritis.  Patient did not respond to IV fluids and developed acute pulmonary edema and renal replacement therapy initiated on 1/23/22        on iHD TThS.  Seems to be making more urine, non-oliguric.     -Acute pulmonary edema, improving with dialysis.     -S/P Cardio respiratory arrest       -Hyperkalemia     -Sepsis with MSSA bacteremia with left foot abscess s/p drainage, osteomyelitis, left hand I&D   Low-grade fever on 2/19     -Anemia - prn prbc's     -Diabetes, uncontrolled     -Hypertension    -Hyperphosphatemia     Plan/     -HD on TTS schedule   Use Nepro for tube feeds   Monitor signs of renal recovery  -Retacrit to 10,000 units qHD.  -renal dose medications   -avoid nephrotoxins

## 2022-02-20 NOTE — PROGRESS NOTES
Dressing to hand changed, wound on sacrum creamed. Pt tolerated well. Turned to far right side. Pt states this is much more comfortable than laying on his back with small turns.
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
## 2022-02-20 NOTE — PROGRESS NOTES
CHIEF COMPLAINT:  Xiomara Webster is here today for First Annual Medicare Wellness Visit.      Medication verified and med list updated  Denies Latex allergy or symptoms of Latex sensitivity.    Refills needed today?  Yes         CHOLESTEROL (mg/dL)   Date Value   03/04/2019 147     HDL (mg/dL)   Date Value   03/04/2019 53     No components found for: CHOLHDLRATIO  TRIGLYCERIDE (mg/dL)   Date Value   03/04/2019 95     CALCULATED LDL (mg/dL)   Date Value   03/04/2019 75      Glucose (mg/dL)   Date Value   02/11/2019 112 (H)       Health Maintenance   Topic Date Due   • Breast Cancer Screening  06/07/2012   • Shingles Vaccine (2 of 3) 01/08/2016   • Osteoporosis Screening  02/06/2018   • Medicare Wellness 65+  02/06/2018   • Pneumococcal Vaccine 65+ High/Highest Risk (2 of 2 - PPSV23) 09/03/2018   • Diabetes Eye Exam  05/03/2019   • DTaP/Tdap/Td Vaccine (1 - Tdap) 11/13/2025 (Originally 11/14/2015)   • Diabetes A1C  09/04/2019   • Diabetes GFR  02/11/2020   • Diabetes Foot Exam  03/04/2020   • Colorectal Cancer Screening-Colonoscopy  06/27/2028   • Influenza Vaccine  Completed   • Hepatitis C Screening  Completed     Over the last 2 weeks, how often have you been bothered by the following problems?          PHQ2 Score:  1  1. Little interest or pleasure in doing things?:  0  2. Feeling down, depressed, or hopeless?:  1        Pt wife in room at this time.

## 2022-02-20 NOTE — PROGRESS NOTES
Pulmonary & Critical Care Medicine ICU Progress Note    CC: Septic shock, MSSA bacteremia, left foot abscess    Events of Last 24 hours:    RA this am   On HD yesterday removed 3L  Dark stool     Vascular lines:    RUE PICC, Temp IJ vas cath    MV:   1/23/22 - 2/5/22; extubated and re-intubated due to unable to clear secretions/hypoxia on 2/5/22 after less than 12 hours  2/5-2/9/22  reintubated 2/13 fr svt and resp distress with secretions-extubated 2/17        Intake/Output Summary (Last 24 hours) at 2/20/2022 0739  Last data filed at 2/20/2022 0515  Gross per 24 hour   Intake 1814.15 ml   Output 3675 ml   Net -1860.85 ml       BP (!) 147/77   Pulse 76   Temp 100.1 °F (37.8 °C) (Bladder)   Resp 23   Ht 6' 1\" (1.854 m)   Wt 221 lb 1.9 oz (100.3 kg)   SpO2 95%   BMI 29.17 kg/m²  RA  Gen: No distress. Eyes: PERRL. No sclera icterus. No conjunctival injection. ENT: No discharge. Pharynx clear. Neck: Trachea midline. No obvious mass. Resp: No accessory muscle use. Few crackles. No wheezes. Few rhonchi. No dullness on percussion. Good air entry. CV: Regular rate. Regular rhythm. No murmur or rub. 1+ LE edema. GI: Non-tender. Non-distended. No hernia. Skin: Warm and dry. No nodule on exposed extremities. Lymph: No cervical LAD. No supraclavicular LAD. M/S: No cyanosis. No joint deformity. No clubbing. Neuro: AAOx23. Followed commands. Psych: No anxiety or agitation.              Scheduled Meds:   epoetin angeles-epbx  10,000 Units IntraVENous Once per day on Tue Thu Sat    insulin glargine  15 Units SubCUTAneous BID    sucralfate  1 g Oral 4x Daily AC & HS    pantoprazole  40 mg IntraVENous BID    ampicillin-sulbactam  3,000 mg IntraVENous Q12H    dilTIAZem  30 mg Oral 4 times per day    b complex-C-folic acid  1 capsule Oral Daily    carvedilol  12.5 mg Oral BID WC    insulin lispro  0-18 Units SubCUTAneous Q4H    sodium chloride flush  5-40 mL IntraVENous 2 times per day    povidone-iodine   Topical Daily    [Held by provider] heparin (porcine)  5,000 Units SubCUTAneous 3 times per day    Venelex   Topical BID    sodium chloride flush  5-40 mL IntraVENous 2 times per day       Data:  CBC:   Recent Labs     02/18/22  0530 02/18/22  1416 02/19/22  0435 02/19/22  0435 02/19/22  1330 02/19/22  2047 02/20/22  0408   WBC 14.6*  --  13.4*  --   --   --  12.7*   HGB 7.2*   < > 7.5*   < > 8.1* 7.7* 7.6*   HCT 21.3*   < > 21.8*   < > 23.7* 22.3* 22.4*   MCV 89.5  --  89.1  --   --   --  89.3     --  285  --   --   --  315    < > = values in this interval not displayed. BMP:   Recent Labs     02/18/22  0530 02/19/22  0435 02/20/22  0408    136 134*   K 4.1 4.4 3.8   CL 98* 99 96*   CO2 22 20* 23   PHOS 4.5 5.4* 4.0   BUN 50* 70* 48*   CREATININE 4.1* 5.9* 4.3*     LIVER PROFILE:   No results for input(s): AST, ALT, LIPASE, BILIDIR, BILITOT, ALKPHOS in the last 72 hours. Invalid input(s): AMYLASE,  ALB    Microbiology:  1/22 [facility] MSSA  1/22 COVID-19 and influenza negative  1/23/22 Blood cx: NGTD  1/23 tracheal aspirate MSSA  1/24 Wound cx: MSSA  1/24 BC MSSA  1/29/22 BAL pending  1/30/2022 blood sent  1/31/2022 left hand Enterococcus and staph aureus  2/1/2022 bone MSSA  2/5/2022 surgical hand culture E.  Faecalis  2/13 BAL NGTD   2/13 BC NGTD       Imaging:   CXR 2/16/2022   Satisfactory ETT position  Satisfactory PICC line position   Low lung volumes  LLL ASD   R midlung cavitary lesion       Echo 1/25/22:   EF 35%, global HK, reduced RV function    ASSESSMENT:  · Acute hypoxemic respiratory failure -recurrent and has been intubated 3 times  · Probable VAP LLL   · Abnormal CXR with R mid lung cavitary lesion -suspecting likely septic emboli from recent bacteremia  · Acute GIB -EGD 2/17 3.5 cm gastric and duodenal bulb ulcer  · Cardiopulmonary arrest x 2 1/23/2022, required CPR, TTM - rewarmed 8 pm 1/25/22  · Acute kidney failure  · MSSA bacteremia  · DM with hyperglycemia · L foot abscess drained 1/24/2022, MSSA; MRI with large fluid collection and changes c/w osteomyelitis, s/p debridement by Dr. Quyen Pinto on 2/1/22  · L hand burn with deep tissue injury & abscess, s/p debridement on 2/5/22  · Paroxysmal AFIB/flutter with RVR  · Cardiomyopathy EF 35% on Echo 1/24/22         PLAN:  Supplemental oxygen to maintain SaO2 >92%; wean as tolerated  Unasyn per ID  Chest CT when stable- not urgent  Nephrology following for HD  Cardizem and Coreg  Speech pathology follow up   NG for meds and tube feed - continue   Lantus 15 BID - watch glucose   Percocet 5 mg Q 4 hrs PRN for pain   Protonix BID and GI following-considering repeat EGD  SCDs  Discussed with family at the bedside  Okay to move out of the ICU from our perspective

## 2022-02-21 PROBLEM — M65.142: Status: RESOLVED | Noted: 2022-01-01 | Resolved: 2022-01-01

## 2022-02-21 PROBLEM — L97.424 DIABETIC ULCER OF LEFT MIDFOOT ASSOCIATED WITH TYPE 2 DIABETES MELLITUS, WITH NECROSIS OF BONE (HCC): Status: ACTIVE | Noted: 2022-01-01

## 2022-02-21 PROBLEM — E11.621 DIABETIC ULCER OF LEFT MIDFOOT ASSOCIATED WITH TYPE 2 DIABETES MELLITUS, WITH NECROSIS OF BONE (HCC): Status: ACTIVE | Noted: 2022-01-01

## 2022-02-21 NOTE — FLOWSHEET NOTE
02/21/22 1100   Negative Pressure Wound Therapy Foot Left;Dorsal   Placement Date/Time: 02/02/22 1100   Pre-existing: No  Inserted by: Lizet HUNTLEY  Location: Foot  Wound Location Orientation: Left;Dorsal   $ Standard NPWT >50 sq cm PER TX $ Yes   Wound Type Surgical   Unit Type Vac Ulta with Veraflo   Dressing Type Black foam   Number of pieces used 1   Cycle Continuous   Target Pressure (mmHg) 125   Intensity 1   Irrigation Solution Sodium chloride 0.9%   Instillation Volume  14 mL   Soak Time  3 minutes   Vac Frequency 2 hours   Canister changed? Yes   Dressing Status Changed   Dressing Changed Changed/New   Drainage Amount Scant   Drainage Description Serous   Dressing Change Due 02/23/22   Wound Assessment Bleeding;Granulation tissue; Other (Comment)  (exposed tendon and bone)   Odor None     Left hand: 5.4x3.7x0.3cm, 100% red, moist granulation tissue, maturation tissue at edges. Tunnels have shut down 100%. Discontinue Hibliclens soaks, apply Xeroform daily with dry gauze and roll gauze. left foot:  3.5x6. 2x2.1c, 2 o'clock=2cm, exposed bone and tendon. Tendon moist, shiny and viable. +red moist granulation tissue. Edges are regular. Bedside debridement done of bone at 2 o'clock per Dr Rogelio Cho. VAC change:  Wound cleansed with ns, patted dry. Periwound skin prepped using barrier wipe and vac drape. One black foam used in wound. Good seal obtained. Veraflo settings unchanged. Next vac change on Wednesday. LLE with roll gauze and compression stocking reapplied. left posterior head:  2.5x3cm, 100% black/brown stable eschar. Sacrum: Unstageable pressure injury, 95% yellow and brown nonviable tissue, 5% PINK/RED AT EDGES. No soi,     Right ear wound resolved.

## 2022-02-21 NOTE — PLAN OF CARE
Nutrition Problem #1: Inadequate oral intake  Intervention: Food and/or Nutrient Delivery: Continue NPO,Modify Tube Feeding  Nutritional Goals: patient will tolerate Nepro at goal rate of 60 ml/hr x 20 hours without GI distress, without s/s of aspiration, and without additional lab/fluid disturbances

## 2022-02-21 NOTE — PROGRESS NOTES
Pulmonary & Critical Care Medicine ICU Progress Note    CC: Septic shock, MSSA bacteremia, left foot abscess    Events of Last 24 hours:    RA this am   HD plan for tomorrow   Dark stool     Vascular lines:    RUE PICC, Temp IJ vas cath    MV:   1/23/22 - 2/5/22; extubated and re-intubated due to unable to clear secretions/hypoxia on 2/5/22 after less than 12 hours  2/5-2/9/22  reintubated 2/13 fr svt and resp distress with secretions-extubated 2/17        Intake/Output Summary (Last 24 hours) at 2/21/2022 0734  Last data filed at 2/21/2022 0615  Gross per 24 hour   Intake 2311 ml   Output 265 ml   Net 2046 ml       /75   Pulse 77   Temp 99.1 °F (37.3 °C) (Bladder)   Resp 14   Ht 6' 1\" (1.854 m)   Wt 226 lb 6.6 oz (102.7 kg)   SpO2 97%   BMI 29.87 kg/m²  RA  Gen: No distress. Eyes: PERRL. No sclera icterus. No conjunctival injection. ENT: No discharge. Pharynx clear. Neck: Trachea midline. No obvious mass. Resp: No accessory muscle use. Few crackles. No wheezes. Few rhonchi. No dullness on percussion. Good air entry. CV: Regular rate. Regular rhythm. No murmur or rub. 1+ LE edema. GI: Non-tender. Non-distended. No hernia. Skin: Warm and dry. No nodule on exposed extremities. Lymph: No cervical LAD. No supraclavicular LAD. M/S: No cyanosis. No joint deformity. No clubbing. Neuro: AAOx23. Followed commands. Psych: No anxiety or agitation.              Scheduled Meds:   ampicillin-sulbactam  3,000 mg IntraVENous 2 times per day    epoetin ang[place]-epbx  10,000 Units IntraVENous Once per day on Tue Thu Sat    insulin glargine  15 Units SubCUTAneous BID    sucralfate  1 g Oral 4x Daily AC & HS    pantoprazole  40 mg IntraVENous BID    dilTIAZem  30 mg Oral 4 times per day    b complex-C-folic acid  1 capsule Oral Daily    carvedilol  12.5 mg Oral BID WC    insulin lispro  0-18 Units SubCUTAneous Q4H    povidone-iodine   Topical Daily    [Held by provider] heparin (porcine)  5,000 Units SubCUTAneous 3 times per day    Venelex   Topical BID    sodium chloride flush  5-40 mL IntraVENous 2 times per day       Data:  CBC:   Recent Labs     02/19/22  0435 02/19/22  1330 02/20/22  0408 02/20/22  1849 02/21/22  0434   WBC 13.4*  --  12.7*  --  11.4*   HGB 7.5*   < > 7.6* 7.5* 7.5*   HCT 21.8*   < > 22.4* 21.6* 21.9*   MCV 89.1  --  89.3  --  89.4     --  315  --  309    < > = values in this interval not displayed. BMP:   Recent Labs     02/19/22  0435 02/20/22  0408 02/21/22  0434    134* 134*   K 4.4 3.8 4.2   CL 99 96* 96*   CO2 20* 23 22   PHOS 5.4* 4.0 5.4*   BUN 70* 48* 70*   CREATININE 5.9* 4.3* 5.6*     LIVER PROFILE:   No results for input(s): AST, ALT, LIPASE, BILIDIR, BILITOT, ALKPHOS in the last 72 hours. Invalid input(s): AMYLASE,  ALB    Microbiology:  1/22 [facility] MSSA  1/22 COVID-19 and influenza negative  1/23/22 Blood cx: NGTD  1/23 tracheal aspirate MSSA  1/24 Wound cx: MSSA  1/24 BC MSSA  1/29/22 BAL pending  1/30/2022 blood sent  1/31/2022 left hand Enterococcus and staph aureus  2/1/2022 bone MSSA  2/5/2022 surgical hand culture E.  Faecalis  2/13 BAL NGTD   2/13 BC NGTD       Imaging:   CXR 2/16/2022   Satisfactory ETT position  Satisfactory PICC line position   Low lung volumes  LLL ASD   R midlung cavitary lesion        Echo 1/25/22:   EF 35%, global HK, reduced RV function    ASSESSMENT:  · Acute hypoxemic respiratory failure -recurrent and has been intubated 3 times  · Probable VAP LLL   · Abnormal CXR with R mid lung cavitary lesion -suspecting likely septic emboli from recent bacteremia  · Acute GIB -EGD 2/17 3.5 cm gastric and duodenal bulb ulcer  · Cardiopulmonary arrest x 2 1/23/2022, required CPR, TTM - rewarmed 8 pm 1/25/22  · Acute kidney failure  · MSSA bacteremia  · DM with hyperglycemia   · L foot abscess drained 1/24/2022, MSSA; MRI with large fluid collection and changes c/w osteomyelitis, s/p debridement by Dr. Candi Diaz on 2/1/22  · L hand burn with deep tissue injury & abscess, s/p debridement on 2/5/22  · Paroxysmal AFIB/flutter with RVR  · Cardiomyopathy EF 35% on Echo 1/24/22         PLAN:  Supplemental oxygen to maintain SaO2 >92%; wean as tolerated  Continue Unasyn per ID  Chest CT when able- not urgent  Nephrology following for HD- tomorrow   Cardizem and Coreg  Speech pathology follow up   NG for meds and tube feed - continue   Lantus 15 BID - watch glucose   Percocet 5 mg Q 4 hrs PRN for pain   Protonix BID and GI following  SCDs  Discussed with family at the bedside  Okay to move out of the ICU from our perspective

## 2022-02-21 NOTE — PROGRESS NOTES
PROGRESS NOTE  S:46 yrs Patient  admitted on 1/22/2022 with Hyperglycemia [R73.9]  RODRICK (acute kidney injury) ([facility] [address] [N17.9]  Acute renal failure, unspecified acute renal failure type ([facility] [address] [N17.9]  Syncope, unspecified syncope type [R55] . Today he feels well. He is passing dark BMs per [employer]. He is tolerating TFs per NG at goal rate. Exam:   Vitals:    02/21/22 0900   BP:    Pulse: 84   Resp: 16   Temp:    SpO2: 93%      General appearance: alert, appears stated age, cooperative, no distress and syndromic appearance - chronically ill appearing  HEENT: Neck supple with midline trachea  Neck: no adenopathy and supple, symmetrical, trachea midline  Lungs: clear to auscultation bilaterally  Heart: regular rate and rhythm, S1, S2 normal, no murmur, click, rub or gallop  Abdomen: soft, non-tender; bowel sounds normal; no masses,  no organomegaly  Extremities: extremities normal, atraumatic, no cyanosis or edema     Medications: Reviewed    Labs:  CBC:   Recent Labs     02/19/22  0435 02/19/22  1330 02/20/22  0408 02/20/22  1849 02/21/22  0434   WBC 13.4*  --  12.7*  --  11.4*   HGB 7.5*   < > 7.6* 7.5* 7.5*   HCT 21.8*   < > 22.4* 21.6* 21.9*   MCV 89.1  --  89.3  --  89.4     --  315  --  309    < > = values in this interval not displayed. BMP:   Recent Labs     02/19/22  0435 02/20/22  0408 02/21/22  0434    134* 134*   K 4.4 3.8 4.2   CL 99 96* 96*   CO2 20* 23 22   PHOS 5.4* 4.0 5.4*   BUN 70* 48* 70*   CREATININE 5.9* 4.3* 5.6*     LIVER PROFILE: No results for input(s): AST, ALT, LIPASE, PROT, BILIDIR, BILITOT, ALKPHOS in the last 72 hours. Invalid input(s): AMYLASE,  ALB  PT/INR: No results for input(s): INR in the last 72 hours.     Invalid input(s): PT      Impression: 42year old male with a history of HTN, DM, A fib, and nonischemic cardiomyopathy admitted with septic shock secondary to MSSA bacteremia and L foot abscess complicated by acute respiratory failure and RODRICK. Hospitalization c/b cardiorespiratory arrest, RODRICK requiring HD, and acute on chronic anemia with heme+stool. EGD showed large duodenal ulcer without active bleeding. Recommendation:  1. Continue supportive care  2. Monitor Hgb  3. Monitor and document output  4. Continue Pantoprazole BID and Carafate QID  5. Hold anticoagulation x 1-2 weeks following EGD   6. Await H pylori stool Ag results   7. Continue TFs per NG as tolerated  8. Advance diet as tolerated per SLP recommendations   9. Pulmonology following   10. HD per nephrology  11. Will follow   12. Will consider repeat EGD if signs of active bleeding      Georgia Starr PA-C  10:59 AM 2/21/2022                Agree w above  42year old HTN, DM, A fib, and nonischemic cardiomyopathy admitted with septic shock secondary to MSSA bacteremia and L foot abscess complicated by acute respiratory failure requiring intubation, and RODRICK. Hospitalization c/b cardiorespiratory arrest, RODRICK requiring HD and acute on chronic anemia s/p multiple transfusions with heme+stool. EGD on 2/17 showed large duodenal ulcer w heme stain but without active bleeding. Last transfused on 2/18. H/H stable since then at around 7.5/22 with no clinical evidence of active GI bleeding. Plan is for Rx for primary team, IV PPI, Follow H/H and transfuse as appropriate. Repeat EGD if the need arises.     Nasim Mayers MD       [phone]) [phone]    '

## 2022-02-21 NOTE — PROGRESS NOTES
[facility] Infectious Disease Progress Note      Sara Charlton     : 1975    DATE OF VISIT:  2022  DATE OF ADMISSION:  2022       Subjective:     Sara Charlton is a 42 y.o. male whom I've been seeing for a deep left foot and hand infection, associated with MSSA bacteremia. Since I last saw him, he's generally done pretty well. Had some low grade fevers over the weekend (100 - 100.5), nothing higher than that, no rigors. Is off oxygen now, generally not very dyspneic, but with an ongoing productive cough. No CP, no N/V, stable diarrhea. Munoz still in place. Definitely still getting a bit more stronger and more interactive each day. Mr. 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     Current Facility-Administered Medications: ampicillin-sulbactam (UNASYN) 3000 mg ivpb minibag, 3,000 mg, IntraVENous, 2 times per day  oxyCODONE-acetaminophen (PERCOCET) 5-325 MG per tablet 1 tablet, 1 tablet, Oral, Q4H PRN  epoetin [place]-epbx (RETACRIT) injection 10,000 Units, 10,000 Units, IntraVENous, Once per day on Tue Thu Sat  insulin glargine (LANTUS) injection vial 15 Units, 15 Units, SubCUTAneous, BID  sucralfate (CARAFATE) tablet 1 g, 1 g, Oral, 4x Daily AC & HS  pantoprazole (PROTONIX) injection 40 mg, 40 mg, IntraVENous, BID  dilTIAZem (CARDIZEM) tablet 30 mg, 30 mg, Oral, 4 times per day  b complex-C-folic acid (NEPHROCAPS) capsule 1 mg, 1 capsule, Oral, Daily  carvedilol (COREG) tablet 12.5 mg, 12.5 mg, Oral, BID WC  insulin lispro (HUMALOG) injection vial 0-18 Units, 0-18 Units, SubCUTAneous, Q4H  povidone-iodine (BETADINE) 10 % external solution, , Topical, Daily  [Held by provider] heparin (porcine) injection 5,000 Units, 5,000 Units, SubCUTAneous, 3 times per day  heparin (porcine) injection 3,000 Units, 3,000 Units, IntraVENous, PRN  sodium chloride 0.9 % irrigation 1,000 mL, 1,000 mL, Irrigation, Continuous PRN  albumin human 25 % IV solution 12.5 g, 12.5 g, IntraVENous, PRN  heparin (porcine) injection 2,600 Units, 2,600 Units, IntraCATHeter, PRN  sodium chloride flush 0.9 % injection 5-40 mL, 5-40 mL, IntraVENous, 2 times per day  sodium chloride flush 0.9 % injection 5-40 mL, 5-40 mL, IntraVENous, PRN  0.9 % sodium chloride infusion, 25 mL, IntraVENous, PRN  acetaminophen (TYLENOL) tablet 650 mg, 650 mg, Oral, Q6H PRN **OR** acetaminophen (TYLENOL) suppository 650 mg, 650 mg, Rectal, Q6H PRN  glucose (GLUTOSE) 40 % oral gel 15 g, 15 g, Oral, PRN  glucagon (rDNA) injection 1 mg, 1 mg, IntraMUSCular, PRN  dextrose 5 % solution, 100 mL/hr, IntraVENous, PRN  dextrose bolus (hypoglycemia) 10% 125 mL, 125 mL, IntraVENous, PRN **OR** dextrose bolus (hypoglycemia) 10% 250 mL, 250 mL, IntraVENous, PRN     This is day 19 of Enterococcal therapy, day 30 of MSSA therapy, POD 20 for the foot, POD 16 for the hand. Allergies: Januvia [sitagliptin], Metformin and related, Vancomycin, and Mustard oil [allyl isothiocyanate]    Pertinent items from the review of systems are discussed in the HPI; the remainder of the ROS was reviewed and is negative.      Objective:     Vital signs over the last 24 hours:  Temp  Av.6 °F (37.6 °C) Min: 99.1 °F (37.3 °C)  Max: 100.4 °F (38 °C)  Pulse  Av.8  Min: 75  Max: 93  Systolic (21ULU), LIP:231 , Min:105 , PSQ:217   Diastolic (64JQO), CCZ:50, Min:63, Max:101  Resp  Av.8  Min: 14  Max: 24  SpO2  Av.1 %  Min: 93 %  Max: 100 %    Constitutional:  well-developed, well-nourished, alert, not quite as weak and fatigued  Neuropsych: more alert, more interactive today, speaking more, not anxious or agitated  Eyes:  pupils equal, round, reactive to light; sclerae anicteric, conjunctivae pale  ENT:  atraumatic; no labial ulcers; no thrush; NGT in place  Resp:  decreased at the bases, maybe more rhonchi today  Cardiovascular:  heart regular, no murmur; generally mild extremity edema; no IV phlebitis; right PICC in place, and a right IJ HD CRRT line, exit sites benign  GI:  abdomen soft, NT, distended, audible bowel sounds, no palpable masses or organomegaly; rectal tube in place, with dark diarrhea  : Munoz in place, increasing amount of clearer yellow urine now  Musculoskeletal:  no clubbing, cyanosis or petechiae; extremities with no gross effusions or acute arthritis  Skin: warm, dry, normal turgor; no acute rash, no peripheral stigmata of endocarditis. Scalp wound smaller, drier, not inflamed, a bit of superficial-seeming eschar. I did not examine the sacral wound today, but our WOCN did - see photo in Media tab. Left hand looks excellent, smaller, red, granular, superficial, proximal tunnels all but resolved, no necrotic tissue.  Left foot looks fairly similar to last week, probably more central granulation, a bit of sloughy SQ tissue at the periphery, exposed tendons starting to granulate more, but still that pocket of depth medially with some irregular and discolored bone that's not really granulating over; no pus.   ______________________________    Recent Labs     22  0434 22  1849 22  0408 22  1330 22  0435   WBC 11.4*  --  12.7*  --  13.4*   HGB 7.5* 7.5* 7.6* < > 7.5*   HCT 21.9* 21.6* 22.4*   < > 21.8*   MCV 89.4  --  89.3  --  89.1     --  315  --  285    < > = values in this interval not displayed. Lab Results   Component Value Date    CREATININE 5.6 (HH) 02/21/2022     Lab Results   Component Value Date    LABALBU 3.0 (L) 02/21/2022     Lab Results   Component Value Date    ALT <5 (L) 02/10/2022    AST 8 (L) 02/10/2022     (H) 01/31/2022    ALKPHOS 217 (H) 02/10/2022    BILITOT 0.5 02/10/2022      Lab Results   Component Value Date    LABA1C 10.6 01/23/2022     Other recent pertinent labs: Anion gap 16. Glucoses 100s - 200s. ANC still mildly elevated at 9200; 300 Eos.   ______________________________    Recent pertinent micro results:  Nothing new since last week.  ______________________________    Recent imaging results (last 7 days):     XR CHEST 1 VIEW    Result Date: 2/16/2022  Bilateral airspace disease greater left parahilar and infrahilar primary concern is pneumonia. Rounded thick-walled lucent lesion in the right mid lung possible focal abscess. As above, other considerations include a pulmonary bleb or pneumatocele with inflammatory margins and unlikely necrotic neoplasm. . CT scan with contrast recommended. [could just be a small, almost round infiltrate and confluence of rib shadows, but could be a lung abscess potentially.]    XR CHEST 1 VIEW    Result Date: 2/15/2022  ET, NG, and 2 central venous catheters are stable. Left greater than right basilar opacification is redemonstrated. Lungs are hypoinflated.       Assessment:     Patient Active Problem List   Diagnosis Code    Hypertension I10    Uncontrolled type 2 diabetes mellitus with diabetic polyneuropathy (HCC) E11.42, E11.65    Cardiomyopathy ([address] I42.9    Mixed hyperlipidemia E78.2    Allergic rhinitis J30.9    Osteoarthritis M19.90    Acute osteomyelitis of left foot ([address] M86.172    RODRICK (acute kidney injury) [facility] N17.9    MSSA bacteremia R78.81, B95.61    Third degree burn of left hand T23.302A    Acute hypoxemic respiratory failure ([facility]) J96.01    Cardiopulmonary arrest ([facility]) I46.9    Hypertriglyceridemia E78.1    Staphylococcal arthritis of left foot ([facility]) M00.072    Antibiotic-associated diarrhea K52.1, T36.95XA    Acute encephalopathy G93.40    Infective tenosynovitis of extensor tendons of left hand M65.142    Enterococcal infection A49.1    Abscess of left hand L02.512    Acute osteomyelitis of left hand ([facility]) M86.142    Pressure injury of deep tissue of sacral region L89.156    Severe protein-calorie malnutrition ([facility]) E43    Paroxysmal atrial fibrillation ([facility]) I48.0    VAP (ventilator-associated pneumonia) ([facility]) J95.851    Abnormal chest x-ray R93.89    Upper GI bleeding K92.2    Duodenal ulcer K26.9    Acute GI bleeding K92.2     Assessment of today's active condition(s):      --          Background of uncontrolled DM2, neuropathy, no known PAD, multiple prior diabetic foot ulcers, infections, surgeries. Most recent wounds were at the left 1st and 2nd ray, but they had been healed for just about a year.      --          Admission this time with septic shock related primarily to deep MSSA foot infection (cellulitis, abscess, septic arthritis, acute osteo), with acute renal failure, lactic acidosis, then cardiac arrest, resuscitation, acute respiratory failure, rapid Afib. Shock resolved, respiratory failure resolved (I think perhaps mostly due to CHF / fluid overload after cardiac arrest).    --          Ongoing ARF, on intermittent HD, but U/O improving this past week.      --          Third degree burn of left hand, with purulence beneath the lysing eschar seen to be running along extensor tendons - MRI and clinical exams c/w soft tissue abscess, septic tenosynovitis, possible acute septic arthritis at the 3rd MCPJ, with adjacent acute metacarpal and phalangeal osteo.  Definitely need to treat the Enterococcus, with it isolated from OR Cxs. Much improved this last week, pus gone, tunnels closed.     --          Initial sepsis finally resolved, after aggressive OR treatment of left foot and left hand infections.      --          Improving encephalopathy, likely multifactorial (cardiac arrest, ICU stay, sedatives, sepsis, renal failure, etc). Also wondering if he might not have a significant degree of critical-illness myopathy and/or neuropathy. Peripheral strength does seem better today than late last week.      --          Colonization with Candida, not surprising given DM, prior steroids, extensive Abx Rx.      --          Unstageable pressure ulcer of sacrum and scalp -- conservative Rx for now, with Triad - looking a bit better.      --          About 10 days ago a setback with difficulty in clearing secretions, hypoxemia, worsening mental status, rapid Afib, reintubation. I think this was more of an issue of retained secretions and perhaps aspiration pneumonitis, as opposed to a true invasive pneumonia. Respiratory function seemed to improve quickly after bronch and supportive care, nothing clearly new on CXR, FIO2 has come down, WBC quickly back down to normal, and nothing on bronch wash / BAL. Now extubated, and looking better than he has recently. Off O2 now.      --          Persistent / recurrent anemia, heme (+), large DU found at EGD.     --          Multifactorial diarrhea (antibiotics, tube feeds, GI bleed. ...). Negative for Cdiff a couple of weeks ago.      --          Likely just a reactive leukocytosis and low-grade fever now, related to wounds, GI bleeding, perhaps a small lung abscess, areas of atelectasis, possible intermittent aspiration etc. Would just follow for now, watch clinically for signs of new nosocomial infection. Treatment recs:     Continue Unasyn. Same dose for now, until renal function improves more substantially.  Continue to watch for drug allergy, clinical signs of Candidiasis, IV site trouble, Cdiff, etc. Planning at least 4 weeks of post-op therapy for the hand, more like 6 weeks for the foot (which can be directed at MSSA only).     Repeat inflammatory markers tomorrow.      No change in wound care right now (I think any simple dressing at the left hand would be fine, Xeroform, Versatel, etc).    No need for antifungals right now.      Keep pushing nutrition, speech therapy, PT, etc,     Tend to agree with Dr. Meenakshi Mccann that we don't necessarily need to get a repeat chest CT at this point. I think the only likely things that could have caused a small right lung abscess since his admission here would be the MSSA bacteremia, or an aspiration process, but he's on good therapy for both, overall respiratory function has improved, and we only ever got normal respiratory keith from secretions / bronch specimens (and the Candida, which doesn't really cause lung infection). If he has higher fevers, worse cough, etc, we can always reconsider a scan. D/W his mom and Dr. Meenakshi Mccann.  ____________________    I also recommended to [facility] that I perform a bedside debridement on his left foot today, for a bit of the residual deep necrotic soft tissue, but also for at least part of that cuneiform bone that doesn't look too healthy, and is slowing wound healing. Ideally we might treat a wound like this in the OR, but he has no sensation in the foot, and I think we can do an adequate job here, without transporting him to the OR. He was in agreement. Procedure note:     Consent obtained. Time out performed per [facility] policy. No topical anesthesia used because of dense neuropathy. Using a rongeur, I sharply debrided the foot (left , dorsal) ulcer(s) down through and including the removal of muscle/fascia and bone. The type(s) of tissue debrided included slough and necrotic/eschar. Total Surface Area Debrided: perhaps 3-4 sq cm. The ulcers were then irrigated with normal saline solution.  The procedure was completed with a small amount of bleeding, and hemostasis was with pressure. The patient tolerated the procedure well, with no significant complications. The patient's level of pain during and after the procedure was monitored. Post-debridement measurements and a post-debridement photo are recorded in the chart. I'm not positive that I got 100% of the necrotic bone debrided today, but a good portion of it, and the visible surface at the end of the procedure was pink and bleeding - none of it seemed overly hard, I think because of chronic Charcot changes, plus the infection. By next Monday I think we'll know if he needs additional debridement there, either bedside or OR. Continuing NPWT for now.      Electronically signed by Maria C Colby MD on 2/21/2022 at 10:55 AM.

## 2022-02-21 NOTE — PROGRESS NOTES
Pt with c/o pain to left arm that is shooting and comes and goes. Per pt, pain is the entire length of the arm and does not radiate anywhere. Pt is also c/o nausea, though this has been a complaint most of the day, the left arm pain is new. NSR with HR in the 80s. Message sent to Dr. Rocio Whitaker who ordered troponin and stat ekg. Labs drawn and EKG tech called.

## 2022-02-21 NOTE — PROGRESS NOTES
Pt resting in bed with eyes closed. Sp02 dropped to 88% with a good pleth. Woke pt up easily, and had him take a few deep breaths. Placed on 2L per NC for sleeping.

## 2022-02-21 NOTE — PROGRESS NOTES
Speech Language Pathology  Facility/Department: [facility] ICU  Dysphagia Daily Treatment Note    Recommendations: Pending MBS results on   Solid Consistency: NPO w/low volume pleasure feeds puree SLP/RN only  Liquid Consistency: NPO w/low volume pleasure feeds thin liquids SLP/RN only  Medication: Meds via alt means of nutrition  Risk management: Control risk factors for aspiration PNA by completing oral care 3-4x/day and increasing physical mobility as is medically feasible. If the patient exhibits s/s of aspiration and/or worsening respiratory status, hold pleasure feeds and contact SLP. NAME: Alessandro Gomez  : 1975  MRN: [id number]    Patient Diagnosis(es):   Patient Active Problem List    Diagnosis Date Noted    Acute GI bleeding     Duodenal ulcer 2022    Upper GI bleeding     Abnormal chest x-ray     VAP (ventilator-associated pneumonia) (HCC)     Paroxysmal atrial fibrillation (HCC)     Severe protein-calorie malnutrition ([address] 2022    Acute osteomyelitis of left hand ([address] 2022    Pressure injury of deep tissue of sacral region 2022    Abscess of left hand     Infective tenosynovitis of extensor tendons of left hand 2022    Enterococcal infection 2022    Staphylococcal arthritis of left foot ([address] 2022    Antibiotic-associated diarrhea 2022    Acute encephalopathy     Hypertriglyceridemia     MSSA bacteremia 2022    Third degree burn of left hand 2022    Acute hypoxemic respiratory failure ([address]     Cardiopulmonary arrest ([address]     RODRICK (acute kidney injury) ([address] 2022    Acute osteomyelitis of left foot ([address] 10/26/2020    Allergic rhinitis 2020    Osteoarthritis     Mixed hyperlipidemia 2016    Cardiomyopathy ([address] 2014    Hypertension     Uncontrolled type 2 diabetes mellitus with diabetic polyneuropathy (HCC)      Allergies:    Allergies   Allergen Reactions    Januvia [Sitagliptin] Nausea Only     Has taken metformin without side effects in the past.  Nausea with Janumet in the past.     Metformin And Related      GI Upset    Vancomycin      Pt had red face, swelling itching of eyelids, sore throat after receiving vancmomyin and cefepime. I think this was a histamine releasing reaction from vancomycin most likely. The cefepime was switched to Zosyn and patient had no reaction to Zosyn    Mustjoyce Reyes Master Isothiocyanate] Swelling and Rash     Onset Date: 02/17/22  Subjective: Pt seen at bedside with spouse present and RN permission     Pain: The patient does not complain of pain     Current Diet: Diet NPO  ADULT TUBE FEEDING; Nasogastric; Renal Formula; Continuous; 40; Yes; 20; Q 4 hours; 60; 30; Q 4 hours; Protein; one proteinex P2Go TWICE daily via feeding tube    Diet Tolerance:  Pt is NPO at this time    Dysphagia Treatment and Impressions:   Pt seen in room at bedside with spouse present and RN permission  · Chart Review/Interview: Pt received pain medication before SLP arrived. Pt was fatigued/drowsy. Pt answered SLP questions with yes/no.  Respiratory Status: Pt with SPO2% of 95 on RA with RR of 17   Liquid PO Trials:     IDDSI 0 Thin: Assessed via straw: Cued for small, single sips. No anterior bolus loss , suspect functional A-P bolus transit, swallow timing subjectively appears timely, and no clinical s/s of aspiration.  Solid PO Trials   IDDSI 4 Puree:   no anterior bolus loss , suspect functional A-P bolus transit, swallow timing subjectively appears timely, oral clearance grossly WFL, no clinical s/s of aspiration and vitals stable. Wet vocal quality after puree trials.     Due to fatigue, pt declined further PO trials   Education: SLP edu pt re: Role of SLP, Rationale for dysphagia tx, Recommended compensatory strategies, Aspiration precautions, POC, Evidenced based practice for current recommendations and treatment, Anatomical components of swallow structures as they pertain to airway protection, Rationale for NPO status and Importance of oral care to reduce adverse affects in the event of aspiration. Pt verbalized understanding, would benefit from ongoing education and RN aware of recommendations  · Assessment: Pt noted with continued tolerance to PO trials at bedside this date. Assessed with thin liquids via straw and puree with no clinical s/s of aspiration or pulmonary compromise. Clinician continues to recommend pleasure feeds to allow pt to regain laryngopharyngeal strength, decrease muscle fatigue and to prevent disuse atrophy. Suspect improved tolerance to volume of intake once fatigue has lessened. Recommend to complete MBS to assess oropharyngeal function and for possible removal of NG if able. SLP explained the process and reasoning for MBS. Keep NPO w/low volume pleasure feeds pending MBS results.  Recommendations: Continue NPO with low volume pleasure feeds of puree and thin liquids; meds via alt means of nutrition (NG).  Risk Management: Control risk factors for aspiration PNA by completing oral care 3-4x/day and increasing physical mobility as is medically feasible. If the patient exhibits s/s of aspiration and/or worsening respiratory status, hold pleasure feeds and contact SLP. Dysphagia Goals:  Short Term Goals:  Timeframe for Short Term Goals: (5 days 02/21/22)  Goal 1: The patient will tolerate repeat BSE as able  2/21/2022 : Goal addressed, see above. Ongoing, progressing. Goal 2: The patient/caregiver will demonstrate understanding of compensatory swallow strategies, for improved swallow safety  2/21/2022 : Goal addressed, see above. Ongoing, progressing. Goal 4: The patient will tolerate instrumental assessment when able   2/21/2022 :  Goal addressed, see above.  Ongoing, progressing.      Long Term Goals:   Timeframe for Long Term Goals: (7 days 02/23/22)  Goal 1: The patient will tolerate least restrictive diet with no clinical s/s of aspiration or worsening respiratory/pulmonary status  2/21/2022 : Ongoing, progressing. Speech/Language/Cog Goals: N/A    Recommendations:  Pending MBS results on 02/22  Solid Consistency: NPO w/low volume pleasure feeds puree SLP/RN only  Liquid Consistency: NPO w/low volume pleasure feeds thin liquids SLP/RN only  Medication: Meds via alt means of nutrition    Patient/Family/Caregiver Education: See above    Compensatory Strategies: See above    Plan:    Continued Dysphagia treatment with goals per plan of care. Discharge Recommendations: TBD    If pt discharges from hospital prior to Speech/Swallowing discharge, this note serves as tx and discharge summary.      Total Treatment Time / Charges     Time in Time out Total Time / units   Cognitive Tx         Speech Tx      Dysphagia Tx 1247 1300 13 minutes/ 1 unit      Signature:  [address] [facility]  Clinician     Co-Signing Supervisor:  Ginger Layton M.A., [address] [place] [address] Pathologist  Phone: [phone]
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
## 2022-02-21 NOTE — CARE COORDINATION
INTERDISCIPLINARY PLAN OF CARE CONFERENCE    Date/Time: 2/21/2022 9:32 AM  Completed by:  ROSALIA Gallego  Case Management      Patient Name:  Nydia Davila  YOB: 1975  Admitting Diagnosis: Hyperglycemia [R73.9]  RODRICK (acute kidney injury) ([facility] [address] [N17.9]  Acute renal failure, unspecified acute renal failure type ([facility] [address] [N17.9]  Syncope, unspecified syncope type [R55]     Admit Date/Time:  1/22/2022  1:32 PM    Chart reviewed. Interdisciplinary team contacted or reviewed plan related to patient progress and discharge plans. Disciplines included Case Management, Nursing, and Dietitian. Current Status:ongoing. NG tube. PT/OT recommendation for discharge plan of care: SNF    Expected D/C Disposition:  Skilled nursing facility  Confirmed plan with patient and/or family Yes confirmed with: (name) pt's wife Neris Jha in ICU    Discharge Plan Comments: Chart review completed. Pt remains in the ICU. Completed Interdisciplinary rounds with ICU staff; RN inquired about ARU-she is going to have PT/OT work with pt when appropriate. Message left requesting for Sunni at the [facility] asking for a call back. Message left for Rashaun Kelly at [facility] requesting a call back. SNF's unable to accept:  1. [address] stated insurance out of network   2. [facility] states they don't accept insurance  3. [address] states no current beds and unsure when there will be an opening. [address]-aren't accepting pt's needing HD  5. [facility] doesn't meet the age criteria (only accepts under 54)  6. [facility]-insurance out of network  7. [facility] out of network  8. [facility]- insurance out of network  9. [facility]-no beds  10.  [facility] due to bed availability and currently has no openings for in-house HD but could pt's can be transported to an outpatient HD clinic. 6. Nghia of [address] stated they don't have a contract with pt's insurance but may be able to get a one time approval if there are several in network denials. 12. The [facility] stated pt MUST be able to stand pivot to a wheelchair and are not able to accept pt with needing stretcher transportation. Called and spoke with Albino Diaz at [facility] who [facility]40am. Albino Diaz stated that this slot can be held usually for 2-3 weeks and just needs to remain in communication with the center. Albino Diaz aware Janine Gooden is working on [employer] and will update once location has been obtaine. Updated pt's wife Radha Alicea via phone call and she stated she will call writer with more SNF choices. Will work on getting more SNF locations. Barrier: pt's age, insurance and needing stretcher transportation to/from HD. CM will continue to follow and assist. Please notify CM if needs or concerns arise.     Home O2 in place on admit: No

## 2022-02-21 NOTE — PROGRESS NOTES
Nephrology Progress Note   [facility]      This patient is a 42year old male whom we are following for RODRICK, HD dependent. Subjective:  Patient is sleeping, has low-grade fevers    NG placed. No shortness of breath. Had a right IJ temporary dialysis line, very weak     HD on 2/19 with 3 L UF post weight 102.6 kg  HD on 2/17 with 2.65 L UF, post weight 105.7 kg, needed 25% IV albumin once  HD on 2/15 w 2.7 l UF,post wt 103 kg   HD on 2/13 with 300 mL net UF, post weight 108.8 kg, patient received IV albumin x3 and 1 unit of prbc    Last 24-hour urine output of 265 mL    ROS: Temp 100.4 yesterday, awake   Social: No family at bedside. Vitals:  /75   Pulse 77   Temp 99.1 °F (37.3 °C) (Bladder)   Resp 14   Ht 6' 1\" (1.854 m)   Wt 226 lb 6.6 oz (102.7 kg)   SpO2 97%   BMI 29.87 kg/m²   I/O last 3 completed shifts: In: 3128.2 [I.V.:1357; ZR/YF:1550; IV Piggyback:383.2]  Out: 415 [Urine:415]  No intake/output data recorded.     Physical Exam:  Gen: Resting  Cardiovascular:  S1, S2 without m/r/g trace lower extremity edema  Respiratory: Decreased breath sounds  Abdomen:  soft, nt, nd,   Neuro/Psy: Off sedation  Access: R IJ VasCath      Medications:   ampicillin-sulbactam  3,000 mg IntraVENous 2 times per day    epoetin angeles-epbx  10,000 Units IntraVENous Once per day on Tue Thu Sat    insulin glargine  15 Units SubCUTAneous BID    sucralfate  1 g Oral 4x Daily AC & HS    pantoprazole  40 mg IntraVENous BID    dilTIAZem  30 mg Oral 4 times per day    b complex-C-folic acid  1 capsule Oral Daily    carvedilol  12.5 mg Oral BID WC    insulin lispro  0-18 Units SubCUTAneous Q4H    povidone-iodine   Topical Daily    [Held by provider] heparin (porcine)  5,000 Units SubCUTAneous 3 times per day    Venelex   Topical BID    sodium chloride flush  5-40 mL IntraVENous 2 times per day         Labs:  Recent Labs     02/19/22  0435 02/19/22  1330 02/20/22  0408 02/20/22  1849 02/21/22  0434 WBC 13.4*  --  12.7*  --  11.4*   HGB 7.5*   < > 7.6* 7.5* 7.5*   HCT 21.8*   < > 22.4* 21.6* 21.9*   MCV 89.1  --  89.3  --  89.4     --  315  --  309    < > = values in this interval not displayed. Recent Labs     02/19/22  0435 02/20/22  0408 02/21/22  0434    134* 134*   K 4.4 3.8 4.2   CL 99 96* 96*   CO2 20* 23 22   GLUCOSE 207* 198* 191*   PHOS 5.4* 4.0 5.4*   BUN 70* 48* 70*   CREATININE 5.9* 4.3* 5.6*   LABGLOM 10* 15* 11*   GFRAA 13* 18* 13*            Assessment/      RODRICK likely related to prerenal factors in the setting of sepsis, use of diuretics and losartan contributing to multifactorial ATN. Maria Antonia Rodes is not suggestive of staph associated glomerulonephritis.  Patient did not respond to IV fluids and developed acute pulmonary edema and renal replacement therapy initiated on 1/23/22        on iHD TThS.  Seems to be making more urine, non-oliguric.     -Acute pulmonary edema, improving with dialysis.     -S/P Cardio respiratory arrest       -Hyperkalemia     -Sepsis with MSSA bacteremia with left foot abscess s/p drainage, osteomyelitis, left hand I&D   Low-grade fever on 2/19     -Anemia - prn prbc's     -Diabetes, uncontrolled     -Hypertension    -Hyperphosphatemia     Plan/     -HD on TTS schedule   Use Nepro for tube feeds   Monitor signs of renal recovery  -Retacrit to 10,000 units qHD.  -renal dose medications   -avoid nephrotoxins

## 2022-02-21 NOTE — PLAN OF CARE
Problem: Falls - Risk of:  Goal: Will remain free from falls  Description: Will remain free from falls  Outcome: Ongoing  Goal: Absence of physical injury  Description: Absence of physical injury  Outcome: Ongoing     Problem: Skin Integrity:  Goal: Will show no infection signs and symptoms  Description: Will show no infection signs and symptoms  Outcome: Ongoing  Goal: Absence of new skin breakdown  Description: Absence of new skin breakdown  Outcome: Ongoing     Problem: Confusion - Acute:  Goal: Absence of continued neurological deterioration signs and symptoms  Description: Absence of continued neurological deterioration signs and symptoms  Outcome: Ongoing  Goal: Mental status will be restored to baseline  Description: Mental status will be restored to baseline  Outcome: Ongoing     Problem: Discharge Planning:  Goal: Ability to perform activities of daily living will improve  Description: Ability to perform activities of daily living will improve  Outcome: Ongoing  Goal: Participates in care planning  Description: Participates in care planning  Outcome: Ongoing     Problem: Injury - Risk of, Physical Injury:  Goal: Will remain free from falls  Description: Will remain free from falls  Outcome: Ongoing  Goal: Absence of physical injury  Description: Absence of physical injury  Outcome: Ongoing     Problem: Mood - Altered:  Goal: Mood stable  Description: Mood stable  Outcome: Ongoing  Goal: Absence of abusive behavior  Description: Absence of abusive behavior  Outcome: Ongoing  Goal: Verbalizations of feeling emotionally comfortable while being cared for will increase  Description: Verbalizations of feeling emotionally comfortable while being cared for will increase  Outcome: Ongoing     Problem: Psychomotor Activity - Altered:  Goal: Absence of psychomotor disturbance signs and symptoms  Description: Absence of psychomotor disturbance signs and symptoms  Outcome: Ongoing     Problem: Sensory Perception - Impaired:  Goal: Demonstrations of improved sensory functioning will increase  Description: Demonstrations of improved sensory functioning will increase  Outcome: Ongoing  Goal: Decrease in sensory misperception frequency  Description: Decrease in sensory misperception frequency  Outcome: Ongoing  Goal: Able to refrain from responding to false sensory perceptions  Description: Able to refrain from responding to false sensory perceptions  Outcome: Ongoing  Goal: Demonstrates accurate environmental perceptions  Description: Demonstrates accurate environmental perceptions  Outcome: Ongoing  Goal: Able to distinguish between reality-based and nonreality-based thinking  Description: Able to distinguish between reality-based and nonreality-based thinking  Outcome: Ongoing  Goal: Able to interrupt nonreality-based thinking  Description: Able to interrupt nonreality-based thinking  Outcome: Ongoing     Problem: Sleep Pattern Disturbance:  Goal: Appears well-rested  Description: Appears well-rested  Outcome: Ongoing     Problem: Nutrition  Goal: Optimal nutrition therapy  Outcome: Ongoing  Goal: Understanding of nutritional guidelines  Outcome: Ongoing     Problem: Coping:  Goal: Ability to cope will improve  Description: Ability to cope will improve  Outcome: Ongoing   Pt resting in bed, positioned for comfort, reviewed with pt plan of care for shift and medications, pt voices no concerns.

## 2022-02-22 NOTE — PROGRESS NOTES
CM-SR, VSS, pt remains afebrile. Dialysis done this AM & pt lisa well. Speech will be here Thursday AM to do his bedside swallow eval.  Pt is able to take ice chips, water, & puree food until then. Tube feedings infusing @ goal rate & pt is lisa @ this time. Pt is resting w/out evidence of distress @ this time.

## 2022-02-22 NOTE — PROGRESS NOTES
AM assessment done. Dialysis nurse @ the bedside. Pt is alert & oriented. He is resting w/out evidence of distress @ this time.

## 2022-02-22 NOTE — PROGRESS NOTES
Speech Language Pathology  SLP Brief    Name: Nydia Davila  : 1975  Medical Diagnosis: Hyperglycemia [R73.9]  RODRICK (acute kidney injury) ([facility] [address] [N17.9]  Acute renal failure, unspecified acute renal failure type ([facility] [address] [N17.9]  Syncope, unspecified syncope type [R55]      Discussed with radiology and RN. MBS originally planned for 1130. Per RN, pt is in dialysis. MBS moved to 1330. Report to follow upon completion.  Thank you,    Danna Stark M.A., [facility]  Speech-Language Pathologist  Phone: [phone]

## 2022-02-22 NOTE — PROGRESS NOTES
Nephrology Progress Note   [facility]. [facility]      This patient is a 42year old male whom we are following for RODRICK, HD dependent. Subjective:    NG placed. No shortness of breath. Had a right IJ temporary dialysis line. Fevers had resolved     HD on 2/19 with 3 L UF post weight 102.6 kg  HD on 2/17 with 2.65 L UF, post weight 105.7 kg, needed 25% IV albumin once  HD on 2/15 w 2.7 l UF,post wt 103 kg   HD on 2/13 with 300 mL net UF, post weight 108.8 kg, patient received IV albumin x3 and 1 unit of prbc      ROS: Very weak, no fevers, non-oliguric   Social: No family at bedside. Vitals:  /67   Pulse 78   Temp 98.1 °F (36.7 °C) (Bladder)   Resp 12   Ht 6' 1\" (1.854 m)   Wt 237 lb 3.4 oz (107.6 kg)   SpO2 97%   BMI 31.30 kg/m²   I/O last 3 completed shifts: In: 9432 [NG/GT:1298; IV Piggyback:100]  Out: 6298 [Urine:603; Drains:800; Stool:250]  No intake/output data recorded.     Physical Exam:  Gen: Resting  Cardiovascular:  S1, S2 without m/r/g trace lower extremity edema  Respiratory: Decreased breath sounds  Abdomen:  soft, nt, nd,   Neuro/Psy: Off sedation  Access: R IJ VasCath      Medications:   ampicillin-sulbactam  3,000 mg IntraVENous 2 times per day    epoetin angeles-epbx  10,000 Units IntraVENous Once per day on Tue Thu Sat    insulin glargine  15 Units SubCUTAneous BID    sucralfate  1 g Oral 4x Daily AC & HS    pantoprazole  40 mg IntraVENous BID    dilTIAZem  30 mg Oral 4 times per day    b complex-C-folic acid  1 capsule Oral Daily    carvedilol  12.5 mg Oral BID WC    insulin lispro  0-18 Units SubCUTAneous Q4H    povidone-iodine   Topical Daily    [Held by provider] heparin (porcine)  5,000 Units SubCUTAneous 3 times per day    sodium chloride flush  5-40 mL IntraVENous 2 times per day         Labs:  Recent Labs     02/20/22  0408 02/20/22  1849 02/21/22  0434 02/21/22  1708 02/22/22  0340   WBC 12.7*  --  11.4*  --  12.0*   HGB 7.6*   < > 7.5* 7.6* 7.5*   HCT 22.4*   < > 21.9* 22.0* 21.8*   MCV 89.3  --  89.4  --  89.5     --  309  --  310    < > = values in this interval not displayed.      Recent Labs     02/20/22  0408 02/21/22  0434 02/22/22  0340   * 134* 133*   K 3.8 4.2 4.4   CL 96* 96* 94*   CO2 23 22 22   GLUCOSE 198* 191* 181*   PHOS 4.0 5.4* 6.4*   BUN 48* 70* 85*   CREATININE 4.3* 5.6* 7.1*   LABGLOM 15* 11* 8*   GFRAA 18* 13* 10*            Assessment/      RODRICK likely related to prerenal factors in the setting of sepsis, use of diuretics and losartan contributing to multifactorial ATN. Floydene Dines is not suggestive of staph associated glomerulonephritis.  Patient did not respond to IV fluids and developed acute pulmonary edema and renal replacement therapy initiated on 1/23/22        Now had dialysis dependence x 4 weeks, making some urine but limited recovery         On TTS schedule      -Acute pulmonary edema   Resolved with dialysis / now looks euvolemic      -S/P Cardio respiratory arrest       -Sepsis with MSSA bacteremia with left foot abscess s/p drainage, osteomyelitis, left hand I&D   Low-grade fever on 2/19     -Anemia - prn prbc's     -Diabetes, uncontrolled     -Hypertension    -Weakness:  Prolonged ICU stay following MSSA bacteremia complicated by cardiac arrest.  Now with critical illness myopathy and likely neuropathy        Plan/     -HD on TTS schedule   Use Nepro for tube feeds   Monitor signs of renal recovery   Needs TDC at some point   -Retacrit to 10,000 units qHD.  -renal dose medications   -avoid nephrotoxins
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
## 2022-02-22 NOTE — PROGRESS NOTES
PROGRESS NOTE  S:46 yrs Patient  admitted on 1/22/2022 with Hyperglycemia [R73.9]  RODRICK (acute kidney injury) ([facility] [address] [N17.9]  Acute renal failure, unspecified acute renal failure type ([facility] [address] [N17.9]  Syncope, unspecified syncope type [R55] . Today he feels well. He is tolerating TFs per NG. He is passing brown BMs per [employer]ise[employer].     Exam:   Vitals:    02/22/22 1000   BP: 117/79   Pulse: 82   Resp: 14   Temp:    SpO2: 99%      General appearance: appears stated age, cooperative, fatigued and syndromic appearance - chronically ill appearing  HEENT: Neck supple with midline trachea  Neck: no adenopathy and supple, symmetrical, trachea midline  Lungs: clear to auscultation bilaterally  Heart: regular rate and rhythm, S1, S2 normal, no murmur, click, rub or gallop  Abdomen: soft, non-tender; bowel sounds normal; no masses,  no organomegaly  Extremities: edema 1+ BLE     Medications: Reviewed    Labs:  CBC:   Recent Labs     02/20/22  0408 02/20/22  1849 02/21/22  0434 02/21/22  1708 02/22/22  0340   WBC 12.7*  --  11.4*  --  12.0*   HGB 7.6*   < > 7.5* 7.6* 7.5*   HCT 22.4*   < > 21.9* 22.0* 21.8*   MCV 89.3  --  89.4  --  89.5     --  309  --  310    < > = values in this interval not displayed. BMP:   Recent Labs     02/20/22  0408 02/21/22  0434 02/22/22  0340   * 134* 133*   K 3.8 4.2 4.4   CL 96* 96* 94*   CO2 23 22 22   PHOS 4.0 5.4* 6.4*   BUN 48* 70* 85*   CREATININE 4.3* 5.6* 7.1*     Attending Supervising [de-identified] Attestation Statement  The patient is a 42 y.o. male. I have performed a history and physical examination of the patient. I discussed the case with my physician assistant Kenn Gagnon PA-C    I reviewed the patient's Past Medical History, Past Surgical History, Medications, and Allergies.      Physical Exam:  Vitals:    02/22/22 1700 02/22/22 1800 02/22/22 2000 02/22/22 2100   BP: 122/71 114/72 112/69 119/71   Pulse: 90 90 87 94   Resp: 16 11 19 13   Temp:   99 °F (37.2 °C)    TempSrc:   Bladder    SpO2: 94% 96%     Weight:       Height:           Physical Examination: General appearance - ill-appearing  Mental status - alert, oriented to person, place, and time  Eyes - pupils equal and reactive, extraocular eye movements intact  Neck - supple, no significant adenopathy  Chest - clear to auscultation, no wheezes, rales or rhonchi, symmetric air entry  Heart - normal rate, regular rhythm, normal S1, S2, no murmurs, rubs, clicks or gallops  Abdomen - soft, nontender, nondistended, no masses or organomegaly  Extremities - pedal edema 2 +          Impression: 42year old male with a history of HTN, DM, A fib, and nonischemic cardiomyopathy admitted with septic shock secondary to MSSA bacteremia and L foot abscess complicated by acute respiratory failure and RODRICK. Hospitalization c/b cardiorespiratory arrest, RODRICK requiring HD, and acute on chronic anemia with heme+stool. EGD showed large duodenal ulcer without active bleeding.       Recommendation:  1. Continue supportive care  2. Monitor Hgb - stable   3. Monitor and document output  4. Transfuse as needed if Hgb < 7.0  5. Continue Pantoprazole BID and Carafate QID  6. Hold anticoagulation x 1-2 weeks following EGD   7. H pylori stool Ag - negative   8. Continue TFs per NG as tolerated  9. Advance diet as tolerated per SLP recommendations   10. Pulmonology, wound care, and ID following   11. HD per nephrology  12. Will follow   13. Will consider repeat EGD if signs of active bleeding      Arash Das PA-C  11:08 AM 2/22/2022                      42year old male with a history of HTN, DM, A fib, and nonischemic cardiomyopathy admitted with septic shock secondary to MSSA bacteremia and L foot abscess complicated by acute respiratory failure and RODRICK. Hospitalization c/b cardiorespiratory arrest, RODRICK requiring HD and GI bleed secondary to large DU    Continue supportive care. PPI BID x 8wks.  Advance TFs to goal rate. Speech therapy. Monitor Hgb and observe for signs of bleeding.      Ghazal Felton MD          [phone]  [phone]

## 2022-02-22 NOTE — FLOWSHEET NOTE
02/22/22 0900 02/22/22 1230   Vital Signs   /79  --    Pulse 81  --    Resp 13  --    SpO2 94 %  --    Weight 229 lb 4.5 oz (104 kg) 225 lb 5 oz (102.2 kg)       Treatment time: 3.5hr  Net UF: 1.6L    Pre weight: 104kg  Post weight: 102.2kg  EDW: TBD    Access used: RIJ Temp HDC  Access function: Good    Medications or blood products given: Retacrit 10,000 units    Regular outpatient schedule: TBD    Summary of response to treatment: Good, Ending Profile A no refill. Report given to [facility]. Copy of dialysis treatment record placed in chart, to be scanned into EMR.

## 2022-02-22 NOTE — PROGRESS NOTES
Pulmonary & Critical Care Medicine ICU Progress Note    CC: Septic shock, MSSA bacteremia, left foot abscess    Events of Last 24 hours:    RA this am   HD today plan to remove 3L  Stable H&H  No longer with dark stool    Vascular lines:    RUE PICC, Temp IJ vas cath    MV:   1/23/22 - 2/5/22; extubated and re-intubated due to unable to clear secretions/hypoxia on 2/5/22 after less than 12 hours  2/5-2/9/22  reintubated 2/13 fr svt and resp distress with secretions-extubated 2/17        Intake/Output Summary (Last 24 hours) at 2/22/2022 0717  Last data filed at 2/22/2022 0500  Gross per 24 hour   Intake 844 ml   Output 1483 ml   Net -639 ml       /67   Pulse 78   Temp 98.1 °F (36.7 °C) (Bladder)   Resp 12   Ht 6' 1\" (1.854 m)   Wt 237 lb 3.4 oz (107.6 kg)   SpO2 97%   BMI 31.30 kg/m²  RA  Gen: No distress. Eyes: PERRL. No sclera icterus. No conjunctival injection. ENT: No discharge. Pharynx clear. Neck: Trachea midline. No obvious mass. Resp: No accessory muscle use. Few crackles. No wheezes. Few rhonchi. No dullness on percussion. Good air entry. CV: Regular rate. Regular rhythm. No murmur or rub. 1 + LE edema. GI: Non-tender. Non-distended. No hernia. Skin: Warm and dry. No nodule on exposed extremities. Lymph: No cervical LAD. No supraclavicular LAD. M/S: No cyanosis. No joint deformity. No clubbing. Neuro: AAOx23. Followed commands. Psych: No anxiety or agitation.              Scheduled Meds:   ampicillin-sulbactam  3,000 mg IntraVENous 2 times per day    epoetin angeles-epbx  10,000 Units IntraVENous Once per day on Tue Thu Sat    insulin glargine  15 Units SubCUTAneous BID    sucralfate  1 g Oral 4x Daily AC & HS    pantoprazole  40 mg IntraVENous BID    dilTIAZem  30 mg Oral 4 times per day    b complex-C-folic acid  1 capsule Oral Daily    carvedilol  12.5 mg Oral BID WC    insulin lispro  0-18 Units SubCUTAneous Q4H    povidone-iodine   Topical Daily    [Held by provider] heparin (porcine)  5,000 Units SubCUTAneous 3 times per day    sodium chloride flush  5-40 mL IntraVENous 2 times per day       Data:  CBC:   Recent Labs     02/20/22  0408 02/20/22  1849 02/21/22  0434 02/21/22  1708 02/22/22  0340   WBC 12.7*  --  11.4*  --  12.0*   HGB 7.6*   < > 7.5* 7.6* 7.5*   HCT 22.4*   < > 21.9* 22.0* 21.8*   MCV 89.3  --  89.4  --  89.5     --  309  --  310    < > = values in this interval not displayed. BMP:   Recent Labs     02/20/22  0408 02/21/22  0434 02/22/22  0340   * 134* 133*   K 3.8 4.2 4.4   CL 96* 96* 94*   CO2 23 22 22   PHOS 4.0 5.4* 6.4*   BUN 48* 70* 85*   CREATININE 4.3* 5.6* 7.1*     LIVER PROFILE:   No results for input(s): AST, ALT, LIPASE, BILIDIR, BILITOT, ALKPHOS in the last 72 hours. Invalid input(s): AMYLASE,  ALB    Microbiology:  1/22 [facility] MSSA  1/22 COVID-19 and influenza negative  1/23/22 Blood cx: NGTD  1/23 tracheal aspirate MSSA  1/24 Wound cx: MSSA  1/24 BC MSSA  1/29/22 BAL pending  1/30/2022 blood sent  1/31/2022 left hand Enterococcus and staph aureus  2/1/2022 bone MSSA  2/5/2022 surgical hand culture E.  Faecalis  2/13 BAL NGTD   2/13 BC NGTD       Imaging:   CXR 2/16/2022   Satisfactory ETT position  Satisfactory PICC line position   Low lung volumes  LLL ASD   R midlung cavitary lesion        Echo 1/25/22:   EF 35%, global HK, reduced RV function    ASSESSMENT:  · Acute hypoxemic respiratory failure -recurrent and has been intubated 3 times  · Probable VAP LLL   · Abnormal CXR with R mid lung cavitary lesion -suspecting likely septic emboli from recent bacteremia  · Acute GIB -EGD 2/17 3.5 cm gastric and duodenal bulb ulcer  · Cardiopulmonary arrest x 2 1/23/2022, required CPR, TTM - rewarmed 8 pm 1/25/22  · Acute kidney failure  · MSSA bacteremia  · DM with hyperglycemia   · L foot abscess drained 1/24/2022, MSSA; MRI with large fluid collection and changes c/w osteomyelitis, s/p debridement by Dr. Candi Diaz on 2/1/22  · L hand burn with deep tissue injury & abscess, s/p debridement on 2/5/22  · Paroxysmal AFIB/flutter with RVR  · Cardiomyopathy EF 35% on Echo 1/24/22         PLAN:  Supplemental oxygen to maintain SaO2 >92%; wean as tolerated  Continue Unasyn per ID  Chest CT when able- not urgent  Nephrology following for HD- today   Cardizem and Coreg  Sallow evaluation after HD today   NG for meds and tube feed - continue   Lantus 15 BID - watch glucose   Percocet 5 mg Q 4 hrs PRN for pain   Protonix BID and GI following  SCDs  Discussed with family at the bedside  Okay to move out of the ICU from our perspective

## 2022-02-22 NOTE — PROGRESS NOTES
Speech Language Pathology  Facility/Department: [facility] ICU  Dysphagia Daily Treatment Note    Recommendations: Pending FEES results on   Solid Consistency: NPO w/low volume pleasure feeds puree SLP/RN only  Liquid Consistency: NPO w/low volume pleasure feeds thin liquids SLP/RN only  Medication: Meds via alt means of nutrition  Risk management: Control risk factors for aspiration PNA by completing oral care 3-4x/day and increasing physical mobility as is medically feasible. If the patient exhibits s/s of aspiration and/or worsening respiratory status, hold pleasure feeds and contact SLP. NAME: Edmar Lira  : 1975  MRN: [id number]    Patient Diagnosis(es):   Patient Active Problem List    Diagnosis Date Noted    Acute GI bleeding     Diabetic ulcer of left midfoot associated with type 2 diabetes mellitus, with necrosis of bone ([address] 2022    Duodenal ulcer 2022    Upper GI bleeding     Abnormal chest x-ray     Paroxysmal atrial fibrillation (HCC)     Severe protein-calorie malnutrition ([address] 2022    Acute osteomyelitis of left hand ([address] 2022    Pressure injury of deep tissue of sacral region 2022    Enterococcal infection 2022    Staphylococcal arthritis of left foot ([address] 2022    Antibiotic-associated diarrhea 2022    Hypertriglyceridemia     MSSA bacteremia 2022    Third degree burn of left hand 2022    Cardiopulmonary arrest ([address]     RODRICK (acute kidney injury) ([address] 2022    Acute osteomyelitis of left foot ([address] 10/26/2020    Allergic rhinitis 2020    Osteoarthritis     Mixed hyperlipidemia 2016    Cardiomyopathy ([address] 2014    Hypertension     Uncontrolled type 2 diabetes mellitus with diabetic polyneuropathy (HCC)      Allergies:    Allergies   Allergen Reactions    Januvia [Sitagliptin] Nausea Only     Has taken metformin without side effects in the past.  Nausea with Janumet  Due to fatigue, pt declined further PO trials   Education: SLP edu pt re: Role of SLP, Rationale for dysphagia tx, Recommended compensatory strategies, Aspiration precautions, POC, Evidenced based practice for current recommendations and treatment, Anatomical components of swallow structures as they pertain to airway protection, Rationale for NPO status and Importance of oral care to reduce adverse affects in the event of aspiration. Pt verbalized understanding, would benefit from ongoing education and RN aware of recommendations  · Assessment: Pt noted with continued tolerance to PO trials at bedside this date. Assessed with thin liquids via straw and puree with no clinical s/s of aspiration or pulmonary compromise. Clinician continues to recommend pleasure feeds to allow pt to regain laryngopharyngeal strength, decrease muscle fatigue and to prevent disuse atrophy. Suspect improved tolerance to volume of intake once fatigue has lessened. Recommend to complete FEES to assess swallow function and for possible removal of NG if able. SLP explained the process and reasoning for FEES. Keep NPO w/low volume pleasure feeds pending FEES results.  Recommendations: Continue NPO with low volume pleasure feeds of puree and thin liquids with SLP or RN; meds via alt means of nutrition (NG).  Risk Management: Control risk factors for aspiration PNA by completing oral care 3-4x/day and increasing physical mobility as is medically feasible. If the patient exhibits s/s of aspiration and/or worsening respiratory status, hold pleasure feeds and contact SLP. Dysphagia Goals:  Short Term Goals:  Timeframe for Short Term Goals: (5 days 02/21/22)  Goal 1: The patient will tolerate repeat BSE as able  2/22/2022 : Goal addressed, see above. Ongoing, progressing. Goal 2: The patient/caregiver will demonstrate understanding of compensatory swallow strategies, for improved swallow safety  2/22/2022 : Goal addressed, see above. Ongoing, progressing. Goal 4: The patient will tolerate instrumental assessment when able   2/22/2022 :  Goal addressed, see above. Ongoing, progressing.      Long Term Goals:   Timeframe for Long Term Goals: (7 days 02/23/22)  Goal 1: The patient will tolerate least restrictive diet with no clinical s/s of aspiration or worsening respiratory/pulmonary status  2/22/2022 : Ongoing, progressing. Speech/Language/Cog Goals: N/A    Recommendations:  Pending MBS results on 02/24  Solid Consistency: NPO w/low volume pleasure feeds puree SLP/RN only  Liquid Consistency: NPO w/low volume pleasure feeds thin liquids SLP/RN only  Medication: Meds via alt means of nutrition    Patient/Family/Caregiver Education: See above    Compensatory Strategies: See above    Plan:    Continued Dysphagia treatment with goals per plan of care. Discharge Recommendations: TBD    If pt discharges from hospital prior to Speech/Swallowing discharge, this note serves as tx and discharge summary.      Total Treatment Time / Charges     Time in Time out Total Time / units   Cognitive Tx         Speech Tx      Dysphagia Tx 1400 1424 24 minutes/ 1 unit      Signature:  [address] [facility]  Clinician     Co-Signing Supervisor:  Autumn Romero M.A., [address] [place] [address] Pathologist  Phone: [phone]

## 2022-02-22 NOTE — CARE COORDINATION
INTERDISCIPLINARY PLAN OF CARE CONFERENCE    Date/Time: 2/22/2022 11:48 AM  Completed by: ROSALIA Colon  Case Management      Patient Name:  Jennifer Carrillo  YOB: 1975  Admitting Diagnosis: Hyperglycemia [R73.9]  RODRICK (acute kidney injury) ([facility] [N17.9]  Acute renal failure, unspecified acute renal failure type ([facility] [N17.9]  Syncope, unspecified syncope type [R55]     Admit Date/Time:  1/22/2022  1:32 PM    Chart reviewed. Interdisciplinary team contacted or reviewed plan related to patient progress and discharge plans. Disciplines included Case Management, Nursing, and Dietitian. Current Status: ongoing  PT/OT recommendation for discharge plan of care: SNF    Expected D/C Disposition:  Skilled nursing facility  Confirmed plan with patient and/or family Yes confirmed with: (name) pt's wife Luis A in the ICU lobby     Discharge Plan Comments: Chart review completed. Met with pt's wife Luis A. Discussed additional SNF choices and SNF's that provide in house HD. Luis A stated the following SNF's are her choices: 1. [facility] (in house HD)  2. [facility] 3. [facility] 4. [facility] (may be able to get a contract with several no's from in-network SNF's) 5. [facility] 6. [facility] . Luis A states once pt is able to stand pivot to a chair, then she and family can provide transportation to/from HD. She is aware the HD location may change pending SNF location. She stated agreement and had no further questions at this time. Referral called to Jeri Barone at [facility] who states she will run pt's benefits to check insurance and call writer back. Home O2 in place on admit: No    Addendum at 1:40pm:     SNF's unable to accept- See note from writer on 2/21/2022 for SNF's 1-12 unable to accept pt. 5 [facility] stated insurance is not in network   14.  [facility] stated not in network with insurance. 13. [facility] states insurance out of network. 12. [facility] states not in network with insurance    Message left for admissions at [facility]. Referral called to Jah Leo at [facility] as she stated they may be able to get a one time contract with pt's insurance; requested her to review pt and not start cert until writer updates wife. Message left for pt's wife to update her. Addendum at 2:45pm:  Spoke with pt's wife on the above.

## 2022-02-22 NOTE — PROGRESS NOTES
[facility] Infectious Disease Progress Note      Jennifer Carrillo     : 1975    DATE OF VISIT:  2022  DATE OF ADMISSION:  2022       Subjective:     Jennifer Carrillo is a 42 y.o. male whom I've been seeing for a deep left hand and left foot infection, initially with MSSA bacteremia. Since I last saw him, he's been doing ok. A bit stronger and more talkative and more interactive each day. Main complaint is thirst. Getting HD now. Swallow eval later today, I think. NGT still in place for now. No fever, chills, SOB, still some cough, no N/V, perhaps less diarrhea. On room air. Mr. Montelongo Smoker has a past medical history of Abscess of left foot, Abscess of left hand, Acute encephalopathy, Acute hypoxemic respiratory failure ([address] Acute osteomyelitis of right hallux ([address] Cellulitis of left foot, CHF (congestive heart failure) ([address] Chronic osteomyelitis of left foot ([address] Closed displaced fracture of distal phalanx of right little finger, Clostridium difficile infection, Diabetic ulcer of left forefoot associated with type 2 diabetes mellitus, with necrosis of bone ([address] Diabetic ulcer of right great toe associated with type 2 diabetes mellitus, with necrosis of bone ([address] Failed soft 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 toe amputation site, History of hyperbaric oxygen therapy, Infective tenosynovitis of extensor tendons of left hand, Migraine, Possible perforated tympanic membrane, Post-op hematoma of left foot, Recurrent otitis media, Septic shock (HCC), Syncope, Tear of medial meniscus of left knee, Tobacco use, Toe osteomyelitis, left ([address] and VAP (ventilator-associated pneumonia) ([address]     Current Facility-Administered Medications: ondansetron (ZOFRAN) injection 4 mg, 4 mg, IntraVENous, Q6H PRN  ampicillin-sulbactam (UNASYN) 3000 mg ivpb minibag, 3,000 mg, IntraVENous, 2 times per day  oxyCODONE-acetaminophen (PERCOCET) 5-325 MG per tablet 1 tablet, 1 tablet, Oral, Q4H PRN  epoetin angeles-epbx (RETACRIT) injection 10,000 Units, 10,000 Units, IntraVENous, Once per day on Tue Thu Sat  insulin glargine (LANTUS) injection vial 15 Units, 15 Units, SubCUTAneous, BID  sucralfate (CARAFATE) tablet 1 g, 1 g, Oral, 4x Daily AC & HS  pantoprazole (PROTONIX) injection 40 mg, 40 mg, IntraVENous, BID  dilTIAZem (CARDIZEM) tablet 30 mg, 30 mg, Oral, 4 times per day  b complex-C-folic acid (NEPHROCAPS) capsule 1 mg, 1 capsule, Oral, Daily  carvedilol (COREG) tablet 12.5 mg, 12.5 mg, Oral, BID WC  insulin lispro (HUMALOG) injection vial 0-18 Units, 0-18 Units, SubCUTAneous, Q4H  povidone-iodine (BETADINE) 10 % external solution, , Topical, Daily  [Held by provider] heparin (porcine) injection 5,000 Units, 5,000 Units, SubCUTAneous, 3 times per day  heparin (porcine) injection 3,000 Units, 3,000 Units, IntraVENous, PRN  sodium chloride 0.9 % irrigation 1,000 mL, 1,000 mL, Irrigation, Continuous PRN  albumin human 25 % IV solution 12.5 g, 12.5 g, IntraVENous, PRN  heparin (porcine) injection 2,600 Units, 2,600 Units, IntraCATHeter, PRN  sodium chloride flush 0.9 % injection 5-40 mL, 5-40 mL, IntraVENous, 2 times per day  sodium chloride flush 0.9 % injection 5-40 mL, 5-40 mL, IntraVENous, PRN  0.9 % sodium chloride infusion, 25 mL, IntraVENous, PRN  acetaminophen (TYLENOL) tablet 650 mg, 650 mg, Oral, Q6H PRN **OR** acetaminophen (TYLENOL) suppository 650 mg, 650 mg, Rectal, Q6H PRN  glucose (GLUTOSE) 40 % oral gel 15 g, 15 g, Oral, PRN  glucagon (rDNA) injection 1 mg, 1 mg, IntraMUSCular, PRN  dextrose 5 % solution, 100 mL/hr, IntraVENous, PRN  dextrose bolus (hypoglycemia) 10% 125 mL, 125 mL, IntraVENous, PRN **OR** dextrose bolus (hypoglycemia) 10% 250 mL, 250 mL, IntraVENous, PRN     This is day 20 of Enterococcal therapy, day 31 of MSSA therapy, POD 21 for the foot, POD 17 for the hand.      Allergies: Januvia [sitagliptin], Metformin and related, Vancomycin, and Mustard oil [allyl isothiocyanate]    Pertinent items LABALBU 3.0 (L) 02/22/2022     Lab Results   Component Value Date    ALT <5 (L) 02/10/2022    AST 8 (L) 02/10/2022     (H) 01/31/2022    ALKPHOS 217 (H) 02/10/2022    BILITOT 0.5 02/10/2022      Lab Results   Component Value Date    LABA1C 10.6 01/23/2022     Other recent pertinent labs:  Glucoses mostly in the 100s. Anion gap 17. ANC stable at 9300.   ______________________________    Recent pertinent micro results:  Nothing new from last week.  ______________________________    Recent imaging results (last 7 days):     XR CHEST 1 VIEW    Result Date: 2/16/2022  Bilateral airspace disease greater left parahilar and infrahilar primary concern is pneumonia. Rounded thick-walled lucent lesion in the right mid lung possible focal abscess. As above, other considerations include a pulmonary bleb or pneumatocele with inflammatory margins and unlikely necrotic neoplasm. . CT scan with contrast recommended.       Assessment:     Patient Active Problem List   Diagnosis Code    Hypertension I10    Uncontrolled type 2 diabetes mellitus with diabetic polyneuropathy ([facility]) E11.42, E11.65    Cardiomyopathy ([facility] I42.9    Mixed hyperlipidemia E78.2    Allergic rhinitis J30.9    Osteoarthritis M19.90    Acute osteomyelitis of left foot ([facility] M86.172    RODRICK (acute kidney injury) ([facility] N17.9    MSSA bacteremia R78.81, B95.61    Third degree burn of left hand T23.302A    Cardiopulmonary arrest (HCC) I46.9    Hypertriglyceridemia E78.1    Staphylococcal arthritis of left foot ([facility]) M00.072    Antibiotic-associated diarrhea K52.1, T36.95XA    Enterococcal infection A49.1    Acute osteomyelitis of left hand ([facility]) M86.142    Pressure injury of deep tissue of sacral region L89.156    Severe protein-calorie malnutrition (HCC) E43    Paroxysmal atrial fibrillation ([facility]) I48.0    Abnormal chest x-ray R93.89    Upper GI bleeding K92.2    Duodenal ulcer K26.9    Diabetic ulcer of left midfoot associated with type 2 diabetes mellitus, with necrosis of bone ([facility]) E11.621, L97.424     Assessment of today's active condition(s):      --          Background of uncontrolled DM2, neuropathy, no known PAD, multiple prior diabetic foot ulcers, infections, surgeries. Most recent wounds were at the left 1st and 2nd ray, but they had been healed for just about a year.      --          Admission this time with septic shock related primarily to deep MSSA foot infection (cellulitis, abscess, septic arthritis, acute osteo), with acute renal failure, lactic acidosis, then cardiac arrest, resuscitation, acute respiratory failure, rapid Afib. Shock resolved, respiratory failure resolved (I think perhaps mostly due to CHF / fluid overload after cardiac arrest). Initial sepsis finally resolved, after aggressive OR treatment of left foot and left hand infections. Left foot wound overall doing better, still some necrotic deep soft tissue and bone - repeat debridement at bedside yesterday.      --          Ongoing ARF, on intermittent HD, but U/O improving a bit.      --          Third degree burn of left hand, with purulence beneath the lysing eschar seen to be running along extensor tendons - MRI and clinical exams c/w soft tissue abscess, septic tenosynovitis, possible acute septic arthritis at the 3rd MCPJ, with adjacent acute metacarpal and phalangeal osteo. Definitely need to treat the Enterococcus, with it isolated from OR Cxs. Much improved this last week, pus gone, tunnels closed.     --          Improving encephalopathy, likely multifactorial (cardiac arrest, ICU stay, sedatives, sepsis, renal failure, etc).  Also wondering if he might not have a significant degree of critical-illness myopathy and/or neuropathy. Peripheral strength does seem better today than late last week.      --          Colonization with Candida, not surprising given DM, prior steroids, extensive Abx Rx.      --          Unstageable pressure ulcer of sacrum and scalp -- conservative Rx for now, with Triad - looking a bit better.      --          About 10 days ago a setback with difficulty in clearing secretions, hypoxemia, worsening mental status, rapid Afib, reintubation. I think this was more of an issue of retained secretions and perhaps aspiration pneumonitis, as opposed to a true invasive pneumonia. Respiratory function seemed to improve quickly after bronch and supportive care, nothing clearly new on CXR, FIO2 has come down, WBC quickly back down to normal, and nothing on bronch wash / BAL. Now extubated, and looking better than he has recently. Off O2 now. Swallow eval scheduled.     --          Persistent / recurrent anemia, heme (+), large DU found at EGD.     --          Multifactorial diarrhea (antibiotics, tube feeds, GI bleed. ...). Negative for Cdiff a couple of weeks ago.      --          Likely just a reactive leukocytosis and low-grade fever now, related to wounds, GI bleeding, perhaps a small lung abscess, areas of atelectasis, possible intermittent aspiration etc. Would just follow for now, watch clinically for signs of new nosocomial infection.     Treatment recs:     No change in Abx. Follow labs periodically; watch for thrush, Cdiff, line infection, allergic reaction, etc. I meant to repeat a cRP and ESR yesterday - ordered today. Getting HD now. When renal function improves enough, will adjust Abx dose accordingly. No change in wound care for right now. Might need another left foot debridement at some point, but I'll see what he looks like next Monday. Planning for follow-up in the wound care center, once he's well enough to leave the hospital.    Swallow evaluation later today, I believe; the mouth swabs and some cold water are helping his sense of thirst a little bit in the meantime. I may not necessarily see him tomorrow, but will see him on Thursday - please call if any urgent issues in the next 48 hours.      Electronically signed by Tracey Reyez MD on 2/22/2022 at 9:22 AM.
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
## 2022-02-23 NOTE — PROGRESS NOTES
Shift assessment complete at this time. Patient resting quietly in bed. Vital signs stable, will continue to monitor.

## 2022-02-23 NOTE — PROGRESS NOTES
Nephrology Progress Note   [facility]. [facility]      This patient is a 42year old male whom we are following for RODRICK, HD dependent. Subjective:    NG placed. No shortness of breath. Had a right IJ temporary dialysis line. Low grade fevers     HD on 2/21 with UF of 1.6 liters and post weight of 102.2 kg   HD on 2/19 with 3 L UF post weight 102.6 kg  HD on 2/17 with 2.65 L UF, post weight 105.7 kg, needed 25% IV albumin once  HD on 2/15 w 2.7 l UF,post wt 103 kg   HD on 2/13 with 300 mL net UF, post weight 108.8 kg, patient received IV albumin x3 and 1 unit of prbc      ROS: Very weak, no fevers, non-oliguric   Social: No family at bedside. Vitals:  /66   Pulse 96   Temp 100.4 °F (38 °C) (Bladder)   Resp 16   Ht 6' 1\" (1.854 m)   Wt 230 lb 9.6 oz (104.6 kg)   SpO2 99%   BMI 30.42 kg/m²   I/O last 3 completed shifts: In: 6770 [NG/GT:867; IV Piggyback:200]  Out: 4227 [Urine:357]  No intake/output data recorded.     Physical Exam:  Gen: Resting  Cardiovascular:  S1, S2 without m/r/g trace lower extremity edema  Respiratory: Decreased breath sounds  Abdomen:  soft, nt, nd,   Neuro/Psy: Off sedation  Access: R IJ VasCath      Medications:   ampicillin-sulbactam  3,000 mg IntraVENous 2 times per day    epoetin [place]-epbx  10,000 Units IntraVENous Once per day on Tue Thu Sat    insulin glargine  15 Units SubCUTAneous BID    sucralfate  1 g Oral 4x Daily AC & HS    pantoprazole  40 mg IntraVENous BID    dilTIAZem  30 mg Oral 4 times per day    b complex-C-folic acid  1 capsule Oral Daily    carvedilol  12.5 mg Oral BID WC    insulin lispro  0-18 Units SubCUTAneous Q4H    povidone-iodine   Topical Daily    [Held by provider] heparin (porcine)  5,000 Units SubCUTAneous 3 times per day    sodium chloride flush  5-40 mL IntraVENous 2 times per day         Labs:  Recent Labs     02/21/22  0434 02/21/22  0434 02/21/22  1708 02/22/22  0340 02/23/22  0348   WBC 11.4*  --   --  12.0* 10.9   HGB 7.5*   < > 7.6* 7.5* 7.1*   HCT 21.9*   < > 22.0* 21.8* 20.9*   MCV 89.4  --   --  89.5 89.8     --   --  310 336    < > = values in this interval not displayed.      Recent Labs     02/21/22  0434 02/22/22  0340 02/23/22  0348   * 133* 136   K 4.2 4.4 4.2   CL 96* 94* 98*   CO2 22 22 25   GLUCOSE 191* 181* 194*   PHOS 5.4* 6.4* 3.9   BUN 70* 85* 54*   CREATININE 5.6* 7.1* 4.6*   LABGLOM 11* 8* 14*   GFRAA 13* 10* 17*            Assessment/      RODRICK likely related to prerenal factors in the setting of sepsis, use of diuretics and losartan contributing to multifactorial ATN. Floydene Dines is not suggestive of staph associated glomerulonephritis.  Patient did not respond to IV fluids and developed acute pulmonary edema and renal replacement therapy initiated on 1/23/22        Now had dialysis dependence x 4 weeks, making some urine but limited recovery         On TTS schedule      -Acute pulmonary edema   Resolved with dialysis / now looks euvolemic      -S/P Cardio respiratory arrest       -Sepsis with MSSA bacteremia with left foot abscess s/p drainage, osteomyelitis, left hand I&D   Low-grade fever on 2/19     -Anemia - prn prbc's     -Diabetes, uncontrolled     -Hypertension    -Weakness:  Prolonged ICU stay following MSSA bacteremia complicated by cardiac arrest.  Now with critical illness myopathy and likely neuropathy        Plan/     -HD on TTS schedule   Use Nepro for tube feeds   Monitor signs of renal recovery   Needs TDC at some point, given low grade fevers I will discuss with ID as to the timing   -Retacrit to 10,000 units qHD.  -renal dose medications   -avoid nephrotoxins

## 2022-02-23 NOTE — PROGRESS NOTES
Patient A/O x4, responds appropriately at this time. Repositioned for comfort. PRN pain medication to be given for pain rated 7/10. No s/s of distress. VSS. See flowsheet and eMar for additional documentation.     Electronically signed by Jennifer Keith RN on 2/23/2022 at 8:51 AM    Vitals:    02/23/22 0800   BP: 111/69   Pulse: 95   Resp: 19   Temp:    SpO2: 97%

## 2022-02-23 NOTE — CARE COORDINATION
INTERDISCIPLINARY PLAN OF CARE CONFERENCE    Date/Time: 2/23/2022 10:39 AM  Completed by:ROSALIA Jacobs   Case Management      Patient Name:  Colten Zaidi  YOB: 1975  Admitting Diagnosis: Hyperglycemia [R73.9]  RODRICK (acute kidney injury) ([facility] [N17.9]  Acute renal failure, unspecified acute renal failure type ([facility] [N17.9]  Syncope, unspecified syncope type [R55]     Admit Date/Time:  1/22/2022  1:32 PM    Chart reviewed. Interdisciplinary team contacted or reviewed plan related to patient progress and discharge plans. Disciplines included Case Management, Nursing, and Dietitian. Current Status:Ongoing   PT/OT recommendation for discharge plan of care: SNF    Expected D/C Disposition:  Skilled nursing facility  Confirmed plan with patient and/or family Yes confirmed with: (name) pt and pt's wife Eileen Helm at bedside    Discharge Plan Comments: Chart review completed. Pt remains in the ICU. Completed Interdisciplinary rounds with ICU staff;staff aware working on SNF placement. Message left for Minford at the [address] [facility]) requesting a call back to get a determination on referral.     Met with pt and pt's wife Eileen Helm at bedside. Updated her that [facility] referral is pending. Discussed additional SNF's that do in house HD and needing other SNF choices as 16 have said no. She stated understanding and will call writer once locations are determined. Barrier: [employer] and needing stretcher transportation to/from HD. 16 SNF's have declined pt due to insurance, bed or HD needs. CM will continue to follow and assist. Please notify CM if needs or concerns arise. Home O2 in place on admit: No    Addendum at 1:41pm: Received voicemail from pt's wife Eileen Helm stating to try the following SNF's. 1. [facility] (inhouse HD) [address] 3. [facility] (not in network) 4. [address]  5. [facility] 6.  [facility] 7. [facility] (LVM) 8. [facility] (LVM) 9. [facility] (LVM) 10. [facility] (LVM). SNF's unable to accept- See note from writer on 2/21/2022 for SNF's 1-12 and note from writer on 2/23 for SNF's 13-16 unable to accept pt. [facility] states don't accept insurance. 25. [facility] states accepts insurance but can't accept due to needing stretcher transportation to/from HD  19. [facility]-not in network with insurance and doesn't take pt's under 58  21. [facility]-insurance out of network per [place]   21. [facility] out of network per [place]     Pending Referrals:  1. [facility] stated she is awaiting the determination from the  and . [facility] [employer] and has their own transportation but will need to check if there is openings. She will call writer once reviewed.     3. [address] reviewing and will let writer know; she also states it would depend if they can arrange transport to/from HD

## 2022-02-23 NOTE — FLOWSHEET NOTE
02/23/22 1036   Negative Pressure Wound Therapy Foot Left;Dorsal   Placement Date/Time: 02/02/22 1100   Pre-existing: No  Inserted by: Dina Gruber CWSILVESTREN  Location: Foot  Wound Location Orientation: Left;Dorsal   Wound Type Surgical   Unit Type Vac Ulta with Veraflo   Dressing Type Black foam   Number of pieces used 1   Cycle Continuous   Target Pressure (mmHg) 125   Intensity 1   Irrigation Solution Sodium chloride 0.9%   Instillation Volume  14 mL   Soak Time  3 minutes   Vac Frequency 2 hours   Canister changed? No   Dressing Changed Changed/New   Drainage Amount Scant   Drainage Description Serous   Dressing Change Due 02/25/22   Wound Assessment Granulation tissue;Red; Other (Comment)   Shanita-wound Assessment   (bone from 7-8 oclock, exposed viable tendons)   Odor None     Left dorsal foot:  55% red, moist granulation tissue, 45% moist viable tendon and gray and white bone exposed. Surrounding skin intact. No purulence noted. Vac dressing change:    Wound cleansed with ns, patted dry. Periwound skin prepped using barrier wipe and vac drape. One black foam used in wound. Good seal obtained. Veraflo settings unchanged. Next vac change on Friday. LLE with roll gauze and compression stocking reapplied.

## 2022-02-23 NOTE — PROGRESS NOTES
PRN medication given previously per eMar for pain. Patient repositioned for comfort. Denies any needs at this time. No s/s of distress. VSS.
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
## 2022-02-24 NOTE — PROGRESS NOTES
Speech Language Pathology  SLP Pt Update      Name: Juice Shell  : 1975  Medical Diagnosis: Hyperglycemia [R73.9]  RODRICK (acute kidney injury) ([facility] [N17.9]  Acute renal failure, unspecified acute renal failure type ([facility] [N17.9]  Syncope, unspecified syncope type [R55]      Pt has been NPO since . S/p 3 intubations. Tolerating PO trials at bedside. Instrumental assessment warranted prior to diet advancement. MBS was planned for . Attempted x2 and was not able to be completed 2/2 difficulty transferring pt to stretcher due to wound vac, rectal tubing, and wounds. FEES was ordered and scheduled for  at 0730. Additional staffing ensured. ICU staff made aware of scheduled time 24 hrs prior. Henny PRINCE contacted dialysis to ensure FEES could be completed around dialysis schedule. Dialysis reported unable to work around [address] and pt would be picked up at 0700 on . Henny PRINCE attempted to contact dialysis again as day progressed to enquire about other  times as changing FEES scheduled time would be difficult due to staffing/SLP schedule. Unable to reach dialysis throughout end of shift. FEES and additional planned staffing canceled. Discussed attempting MBS again this date with Henny PRINCE. Henny Richards is agreeable and states that transfer to stretcher should be obtainable at this time. MBS order to be placed and is already scheduled for 1430 this date. Confirmed with radiology and Henny PRINCE. SLP to follow and will discuss with MD.     Addendum: Dialysis now not taking pt until afternoon. 1330 per RN. Thus, cannot complete MBS at scheduled time of 1430. Called radiology and MBS time changed to 1100.      Iraj Gramajo M.A., [facility]  Speech-Language Pathologist  Phone: [phone]

## 2022-02-24 NOTE — PROGRESS NOTES
Nephrology Progress Note   KHares. Evaneos      This patient is a 42year old male whom we are following for RODRICK, HD dependent. Subjective:    NG placed. No shortness of breath. Had a right IJ temporary dialysis line. Low grade fevers continue. Hb dropping       HD on 2/21 with UF of 1.6 liters and post weight of 102.2 kg   HD on 2/19 with 3 L UF post weight 102.6 kg  HD on 2/17 with 2.65 L UF, post weight 105.7 kg, needed 25% IV albumin once  HD on 2/15 w 2.7 l UF,post wt 103 kg   HD on 2/13 with 300 mL net UF, post weight 108.8 kg, patient received IV albumin x3 and 1 unit of prbc      ROS: Very weak, no fevers, non-oliguric   Social: No family at bedside. Vitals:  /86   Pulse 94   Temp 100.4 °F (38 °C) (Bladder)   Resp 14   Ht 6' 1\" (1.854 m)   Wt 240 lb 8.4 oz (109.1 kg)   SpO2 98%   BMI 31.73 kg/m²   I/O last 3 completed shifts:   In: 2329.1 [NG/GT:1941; IV Piggyback:388.1]  Out: 475 [Urine:475]  I/O this shift:  In: 234 [NG/GT:234]  Out: -     Physical Exam:  Gen: Resting  Cardiovascular:  S1, S2 without m/r/g trace lower extremity edema  Respiratory: Decreased breath sounds  Abdomen:  soft, nt, nd,   Neuro/Psy: Off sedation  Access: R IJ VasCath      Medications:   insulin glargine  20 Units SubCUTAneous BID    ampicillin-sulbactam  3,000 mg IntraVENous 2 times per day    epoetin [place]-epbx  10,000 Units IntraVENous Once per day on Tue Thu Sat    sucralfate  1 g Oral 4x Daily AC & HS    pantoprazole  40 mg IntraVENous BID    dilTIAZem  30 mg Oral 4 times per day    b complex-C-folic acid  1 capsule Oral Daily    carvedilol  12.5 mg Oral BID WC    insulin lispro  0-18 Units SubCUTAneous Q4H    povidone-iodine   Topical Daily    [Held by provider] heparin (porcine)  5,000 Units SubCUTAneous 3 times per day    sodium chloride flush  5-40 mL IntraVENous 2 times per day         Labs:  Recent Labs     02/22/22  0340 02/23/22  0348 02/24/22  0352   WBC 12.0* 10.9 10.1   HGB 7.5* 7. 1* 6.6*   HCT 21.8* 20.9* 18.8*   MCV 89.5 89.8 89.4    336 322     Recent Labs     02/22/22  0340 02/23/22  0348 02/24/22  0352   * 136 137   K 4.4 4.2 4.1   CL 94* 98* 99   CO2 22 25 24   GLUCOSE 181* 194* 196*   PHOS 6.4* 3.9 4.4   BUN 85* 54* 75*   CREATININE 7.1* 4.6* 5.7*   LABGLOM 8* 14* 11*   GFRAA 10* 17* 13*            Assessment/      RODRICK likely related to prerenal factors in the setting of sepsis, use of diuretics and losartan contributing to multifactorial ATN. Johana Finnish is not suggestive of staph associated glomerulonephritis.  Patient did not respond to IV fluids and developed acute pulmonary edema and renal replacement therapy initiated on 1/23/22        Now had dialysis dependence x 4 weeks, making some urine but limited recovery         On TTS schedule      -Acute pulmonary edema   Resolved with dialysis / now looks euvolemic      -S/P Cardio respiratory arrest    Prolonged hospital stay       -Sepsis with MSSA bacteremia with left foot abscess s/p drainage, osteomyelitis, left hand I&D   Low-grade fever on 2/19     -Anemia - prn prbc's     -Diabetes, uncontrolled     -Hypertension    -Weakness:  Prolonged ICU stay following MSSA bacteremia complicated by cardiac arrest.  Now with critical illness myopathy and likely neuropathy        Plan/     -HD on TTS schedule   Use Nepro for tube feeds   Monitor signs of renal recovery   Needs TDC at some point, given low grade fevers I will discuss with ID as to the timing   -Retacrit to 10,000 units qHD.   Blood transfusion   -renal dose medications   -avoid nephrotoxins

## 2022-02-24 NOTE — PROGRESS NOTES
Patient A/O x4, responds appropriately at this time. Repositioned for comfort. Denies pain at this time. No s/s of distress. VSS. Dialysis planned for AM per dialysis RN yesterday.     See flowsheet and eMar for additional documentation.     Electronically signed by Garth Ko RN on 2/24/2022 at 8:20 AM    Vitals:    02/24/22 0800   BP: (!) 154/84   Pulse: 90   Resp: 14   Temp: 100.4 °F (38 °C)   SpO2: 97%

## 2022-02-24 NOTE — PROGRESS NOTES
PROGRESS NOTE  S:46 yrs Patient  admitted on 1/22/2022 with Hyperglycemia [R73.9]  RODRICK (acute kidney injury) ([facility] [address] [N17.9]  Acute renal failure, unspecified acute renal failure type ([facility] [address] [N17.9]  Syncope, unspecified syncope type [R55] . Today he feels well. He is passing brown BMs per [employer], and tolerating TFs per NG at goal rate. Exam:   Vitals:    02/24/22 1000   BP: (!) 147/89   Pulse: 88   Resp: 14   Temp:    SpO2: 100%      General appearance: appears stated age, cooperative, fatigued, no distress and syndromic appearance - chronically ill appearing  HEENT: Neck supple with midline trachea  Neck: no adenopathy and supple, symmetrical, trachea midline  Lungs: clear to auscultation bilaterally  Heart: regular rate and rhythm, S1, S2 normal, no murmur, click, rub or gallop  Abdomen: soft, non-tender; bowel sounds normal; no masses,  no organomegaly  Extremities: extremities normal, atraumatic, no cyanosis or edema     Medications: Reviewed    Labs:  CBC:   Recent Labs     02/22/22  0340 02/22/22  0340 02/23/22  0348 02/24/22  0352 02/24/22  1000   WBC 12.0*  --  10.9 10.1  --    HGB 7.5*   < > 7.1* 6.6* 7.3*   HCT 21.8*   < > 20.9* 18.8* 20.9*   MCV 89.5  --  89.8 89.4  --      --  336 322  --     < > = values in this interval not displayed. BMP:   Recent Labs     02/22/22  0340 02/23/22  0348 02/24/22  0352   * 136 137   K 4.4 4.2 4.1   CL 94* 98* 99   CO2 22 25 24   PHOS 6.4* 3.9 4.4   BUN 85* 54* 75*   CREATININE 7.1* 4.6* 5.7*     Attending Supervising [de-identified] Attestation Statement  The patient is a 42 y.o. male. I have performed a history and physical examination of the patient. I discussed the case with my physician assistant Woods Goltz PA-C    I reviewed the patient's Past Medical History, Past Surgical History, Medications, and Allergies.      Physical Exam:  Vitals:    02/24/22 1300 02/24/22 1400 02/24/22 1500 02/24/22 1530   BP: (!) 152/84 (!) 147/83  (!) 156/91   Pulse: 88 82 84 78   Resp: 14 14 14 16   Temp:       TempSrc:       SpO2:  99% 99%    Weight:    229 lb 8 oz (104.1 kg)   Height:           Physical Examination: General appearance - chronically ill appearing  Mental status - drowsy  Eyes - pupils equal and reactive, extraocular eye movements intact  Neck - supple, no significant adenopathy  Chest - clear to auscultation, no wheezes, rales or rhonchi, symmetric air entry  Heart - normal rate, regular rhythm, normal S1, S2, no murmurs, rubs, clicks or gallops  Abdomen - soft, nontender, nondistended, no masses or organomegaly  Extremities - pedal edema 2 +          Impression: 42year old male with a history of HTN, DM, A fib, and nonischemic cardiomyopathy admitted with septic shock secondary to MSSA bacteremia and L foot abscess complicated by acute respiratory failure and RODRICK. Hospitalization c/b cardiorespiratory arrest, RODRICK requiring HD, and acute on chronic anemia with heme+stool. EGD showed large duodenal ulcer without active bleeding. No further signs of bleeding. Recommendation:  1. Continue supportive care  2. Monitor Hgb   3. Observe for signs of bleeding  4. Transfuse as needed if Hgb < 7.0  5. Continue Pantoprazole BID and Carafate QID  6. Hold anticoagulation x 1-2 weeks following EGD   7. Continue TFs per NG as tolerated  8. Advance diet as tolerated per SLP recommendations   9. Pulmonology, wound care, and ID following   10. HD per nephrology  11. Will follow   12. Will consider repeat EGD if signs of active bleeding      Zandra Nunez PA-C  11:05 AM 2/24/2022                      42year old male with a history of HTN, DM, A fib, and nonischemic cardiomyopathy admitted with septic shock secondary to MSSA bacteremia and L foot abscess complicated by acute respiratory failure and RODRICK. Hospitalized c/b cardiorespiratory arrest, RODRICK requiring HD and GI bleed secondary to large DU.  No further signs of bleeding     Continue supportive care. PPI BID x 8wks. Advance TFs to goal rate.  Advance diet per speech therapy eval. Monitor Hgb and observe for signs of bleeding.       Neri Cunningham MD          [phone]

## 2022-02-24 NOTE — CARE COORDINATION
LTACH list and have the following choices 1. [facility] 2. [facility] 3. [facility].    Message left for Jac Card at [facility] requesting a call back. Referral given to Aubrie at [facility]. Addendum at 1:30pm: Received call from Jac Card at [address] [place] [address]; referral given     Addendum at 2:40pm: Received call from [facility] at [facility] who states pt is LTACH appropriate but would need to be Monday, Wednesday, Friday and need a tunnel cath with one treatment. She is aware writer will update once [facility] makes a determination     Addendum at 3:30pm: Received voicemail from Jac Card at [address] [place] [address] stating pt meets criteria and they will need to get cert. Updated pt's wife Peggy Mccarthy via phone call. She [facility]. Jac Card at [facility] updated and stated she doesn't know when she will have open beds for HD but hopeful for early next week.
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
## 2022-02-24 NOTE — PROCEDURES
INSTRUMENTAL SWALLOW REPORT  MODIFIED BARIUM SWALLOW    Diet Recommendation: Recommend to keep NG in place (with no TF running) pending consistent adequate nutrition via PO intake alone, as disuse atrophy and fatigue may result in reduced overall PO intake. Advance to IDDSI 4 Puree Solids; IDDSI 0 Thin Liquids; Meds crushed in puree as able  Risk Management: upright for all intake, stay upright for at least 30 mins after intake, small bites/sips, distant supervision with intake, oral care 2-3x/day to reduce adverse affects in the event of aspiration, increase physical mobility as able, alternate bites/sips, slow rate of intake, general aspiration precautions and hold PO and contact SLP if s/s of aspiration or worsening respiratory status develop.      NAME: Vanessa Beasley   : 1975  MRN: [id number]       Date of Eval: 2022     Ordering Physician: Dr. Luis A Burciaga  Radiologist: Dr. Monalisa Edmondson     Referring Diagnosis(es): Referring Diagnosis: Oropharyngeal dysphagia    Past Medical History:  has a past medical history of Abscess of left foot, Abscess of left hand, Acute encephalopathy, Acute hypoxemic respiratory failure ([address] Acute osteomyelitis of right hallux ([address] Cellulitis of left foot, CHF (congestive heart failure) ([address] Chronic osteomyelitis of left foot ([address] Closed displaced fracture of distal phalanx of right little finger, Clostridium difficile infection, Diabetic ulcer of left forefoot associated with type 2 diabetes mellitus, with necrosis of bone ([address] Diabetic ulcer of right great toe associated with type 2 diabetes mellitus, with necrosis of bone ([address] Failed soft 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 toe amputation site, History of hyperbaric oxygen therapy, Infective tenosynovitis of extensor tendons of left hand, Migraine, Possible perforated tympanic membrane, Post-op hematoma of left foot, Recurrent otitis media, Septic shock ([address] Syncope, Tear of medial meniscus of left knee, Tobacco use, Toe osteomyelitis, left ([facility] and VAP (ventilator-associated pneumonia) ([facility] Past Surgical History:  has a past surgical history that includes Tonsillectomy; [place] tooth extraction; Knee arthroscopy (Left, [id number]); Toe amputation (Right, 8/23/2019); other surgical history (Left, 07/24/2020); Toe amputation (Left, 7/24/2020); Toe amputation (Left, 10/22/2020); Foot Debridement (Left, 2/1/2022); Arm Surgery (Left, 2/5/2022); IR NONTUNNELED VASCULAR CATHETER > 5 YEARS (2/11/2022); and Upper gastrointestinal endoscopy (N/A, 2/17/2022). Type of Study: Initial MBS     Recent CT of Chest: 02/24/22  Impression   1. Right upper lobe cavitary airspace disease concerning for pneumonia. Recommend CT of the chest with contrast in 8 weeks to confirm resolution. 2. Nasogastric tube terminating in the proximal gastric body should be   advanced approximately 5 cm. 3. Small left pleural effusion with atelectasis       Patient Complaints/Reason for Referral:  Magui Everett was referred for a MBS to assess the efficiency of his/her swallow function, assess for aspiration, and to make recommendations regarding safe dietary consistencies, effective compensatory strategies, and safe eating environment. Onset of problem:   Date of Onset: 02/17/22    General Comment  Comments: Chart reviewed prior to completion of assessment  Subjective  [facility]    Behavior/Cognition/Vision/Hearing:  Behavior/Cognition: Alert; Cooperative;Pleasant mood      Medical record review/interview: Per MD H&P \"Patient is a 44-year-old white male with a prior history of diabetes mellitus type 2 poorly controlled, CHF, history of diabetic ulcer with amputation of the right great, history of tobacco abuse, recent burn to the left hand-Velban been feeling well since Wednesday. Initially thought he had COVID. Home COVID test is negative. Patient is sleeping more and had decreased appetite.   Wife finally called 911.  Work-up in the ED showed acute kidney injury. His creatinine was normal in October 2021. Patient is admitted to the hospital and started on IV fluids. This morning his creatinine is worse. He is made about 300 cc of urine. His mentation is worse. He is not requiring oxygen. The time of admission he did not require oxygen. He is presently on 5 L and saturating 90%. His respiratory rate is also got worse. His mentation is about the same. He can answer some questions. He denies any chest pain.     The patient has been apparently taking ibuprofen for the last 2 days but does not take NSAIDs on a regular basis. He denies any chest pain. Patient is vaccinated against COVID-19 but has not had the booster.     Patient is allergic to [place] [sitagliptin], metformin and related, vancomycin, and mustard oil [allyl isothiocyanate]. \"     Per SLP BSE \" [facility] called 01/23/22. Patient not breathing with no pulse. CPR and ACLS protocol were followed after which patient gained pulse and blood pressure back. In route to ICU he lost his pulse again for which CPR was restarted. Patient gained his pulse back a second time and started on epinephrine drip.      Intubated 1/23/22 - 2/5/22; extubated and re-intubated due to unable to clear secretions/hypoxia on 2/5/22 after less than 12 hours. Pt extubated on 2/9/22. Pt was assessed on 02/10 with rec's for NPO with ice chips. Pt was alert, not verbal. Followed minimal commands. Could track with eyes at times. At that time suspected anoxic brain injury and/or over sedation. Pt was unable to maintain adequate O2 sats and was re-intubated for the third time on 02/13. Pt was extubated to 4L NC today. He is somewhat verbal and does follow commands. Appears alert and oriented. Appears fatigued. Spouse at bedside. \"     Pt has been tolerating PO intake at bedside for swallow tx. Pt exhibits fatigue and suspected disuse atrophy of oropharyngeal and laryngeal musculature. Instrumental swallow study warranted to assess oropharyngeal function prior to PO diet advancement     Predisposing dysphagia risk factors: N/A prior to admission   Clinical signs of possible chronic dysphagia: reduced PO intake prior to admission   Precipitating dysphagia risk factors: reduced physical mobility, increased O2 demands, emergent/traumatic intubation, suspected disuse atrophy, recent and recurrent intubation    Trials and Findings:  Trials:  IDDSI 0 (thin): Assessed via tsp, cup, and straw. IDDSI 4 (puree)  IDDSI 6 (soft and bite-sized)    Mild oropharyngeal dysphagia     Findings:   Mild oral phase deficits characterized by reduced bolus control resulting in premature spillage to valleculae with thin liquid via cup and soft solid trials. Prolonged mastication with soft solids, however, grossly functional. Piecemeal deglutition with soft solid trial. Mild pharyngeal phase deficits characterized by, [id number] penetration noted with thin liquid via straw sip (could be related to epiglottic contact with NG tube on PPW). No aspiration noted with any consistency administered. Trace amount of residue in the valleculae post swallow with all trials as a result of reduced lingual base retraction. Impressions/Assessment: mild oropharyngeal dysphagia, likely subacute related to reduced physical mobility, increased O2 demands, fatigue, disuse atrophy, generalized weakness and emergent/traumatic intubation. Swallow safety is preserved; swallow efficiency is preserved. Pt appears to be at low risk for aspiration PNA, and moderate risk for malnutrition/dehydration. Diet modification is indicated. Swallow prognosis is good given tolerance to PO at bedside and lack of aspiration noted on instrumental assessment. Patient appears to be a good candidate for behavioral swallow rehabilitation.     Diet Recommendation: Recommend to keep NG in place (with no TF running) pending consistent adequate nutrition via PO intake alone, as disuse atrophy and fatigue may result in reduced overall PO intake. Advance to IDDSI 4 Puree Solids; IDDSI 0 Thin Liquids; Meds crushed in puree as able  Risk Management: upright for all intake, stay upright for at least 30 mins after intake, small bites/sips, distant supervision with intake, oral care 2-3x/day to reduce adverse affects in the event of aspiration, increase physical mobility as able, alternate bites/sips, slow rate of intake, general aspiration precautions and hold PO and contact SLP if s/s of aspiration or worsening respiratory status develop. Specialist Referrals: ENT   Ancillary Tests: N/A   Goals: Tolerate LRD  Follow-up exam: Will f/u during weekly tx sessions    Treatment Dx and ICD 10: Oropharyngeal Dysphagia R13.12   Patient Position: Lateral and Patient Degrees: Pt seated upright at 90 degress in right lateral position    Penetration-Aspiration Scale (PAS): 2 - Material enters the airway, remains above the vocal folds, and is ejected from the airway    Recommendations/Treatment  Requires SLP Intervention: Yes        D/C Recommendations: To be determined    Referral To: ENT    Education: Images and recommendations were reviewed with patient following this exam.   Patient Education: SLP educated the patient re: Role of SLP, rationale for completion of assessment, anatomical components of swallow structures as they pertain to airway protection, results of assessment, recommendations, POC and rationale for MBS  Patient Education Response: Demonstrated understanding;Verbalizes understanding    Prognosis  Prognosis for safe diet advancement: fair  Barriers to reach goals: fatigue  Duration/Frequency of Treatment  Duration/Frequency of Treatment: 3-5x/wk  Safety Devices  Safety Devices in place: Yes  Type of devices: All fall risk precautions in place; Left in bed;Bed alarm in place;Call light within reach;Nurse notified      Goals:    Short Term Goals:  Timeframe for Short Term Goals: (5 days 02/21/22)  Goal 1: The patient will tolerate repeat BSE as able     Goal 2: The patient/caregiver will demonstrate understanding of compensatory swallow strategies, for improved swallow safety    Goal 3: The patient will tolerate instrumental assessment when able   2/24/2022 : Goal met      Goal 4: The patient will tolerate recommended diet with no clinical s/s of aspiration 5/5    Long Term Goals:   Timeframe for Long Term Goals: (7 days 02/23/22)  Goal 1: The patient will tolerate least restrictive diet with no clinical s/s of aspiration or worsening respiratory/pulmonary status  2/23/2022 : Ongoing, progressing.     Pain   Patient Currently in Pain: No  Pain Level: 0  Pain Type: Acute pain  Pain Location: Other (Comment) (hemrroid pain)      Therapy Time:   Individual   Time In 1100   Time Out 1130   Minutes 30 minutes      Signature:   [address] [facility]  Clinician     Co-Signing Supervisor:  Floyd Muñiz M.A., [facility] #[id number]  Speech-Language Pathologist  Phone: [phone], [phone]

## 2022-02-24 NOTE — PROGRESS NOTES
[facility] Infectious Disease Progress Note      Katy Daigle     : 1975    DATE OF VISIT:  2022  DATE OF ADMISSION:  2022       Subjective:     Katy Daigle is a 42 y.o. male whom I've been seeing for deep infections of the left hand and left foot, with initial MSSA bacteremia. Since I last saw him, he's doing rather well. Not able to get swallowing eval yet, so NGT still in place. Getting PRBCs this AM. Scheduled for HD later today. No feelings of F/C/D, no pain at present, no vomiting, no SOB at rest, less cough. Mr. Parish Suarez has a past medical history of Abscess of left foot, Abscess of left hand, Acute encephalopathy, Acute hypoxemic respiratory failure ([address] Acute osteomyelitis of right hallux ([address] Cellulitis of left foot, CHF (congestive heart failure) ([address] Chronic osteomyelitis of left foot ([address] Closed displaced fracture of distal phalanx of right little finger, Clostridium difficile infection, Diabetic ulcer of left forefoot associated with type 2 diabetes mellitus, with necrosis of bone ([address] Diabetic ulcer of right great toe associated with type 2 diabetes mellitus, with necrosis of bone ([address] Failed soft 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 toe amputation site, History of hyperbaric oxygen therapy, Infective tenosynovitis of extensor tendons of left hand, Migraine, Possible perforated tympanic membrane, Post-op hematoma of left foot, Recurrent otitis media, Septic shock (HCC), Syncope, Tear of medial meniscus of left knee, Tobacco use, Toe osteomyelitis, left ([address] and VAP (ventilator-associated pneumonia) ([address]     Current Facility-Administered Medications: 0.9 % sodium chloride infusion, , IntraVENous, PRN  insulin glargine (LANTUS) injection vial 20 Units, 20 Units, SubCUTAneous, BID  ondansetron (ZOFRAN) injection 4 mg, 4 mg, IntraVENous, Q6H PRN  ampicillin-sulbactam (UNASYN) 3000 mg ivpb minibag, 3,000 mg, IntraVENous, 2 times per day  oxyCODONE-acetaminophen (PERCOCET) 5-325 MG per tablet 1 tablet, 1 tablet, Oral, Q4H PRN  epoetin [place]-epbx (RETACRIT) injection 10,000 Units, 10,000 Units, IntraVENous, Once per day on Tue Thu Sat  sucralfate (CARAFATE) tablet 1 g, 1 g, Oral, 4x Daily AC & HS  pantoprazole (PROTONIX) injection 40 mg, 40 mg, IntraVENous, BID  dilTIAZem (CARDIZEM) tablet 30 mg, 30 mg, Oral, 4 times per day  b complex-C-folic acid (NEPHROCAPS) capsule 1 mg, 1 capsule, Oral, Daily  carvedilol (COREG) tablet 12.5 mg, 12.5 mg, Oral, BID WC  insulin lispro (HUMALOG) injection vial 0-18 Units, 0-18 Units, SubCUTAneous, Q4H  povidone-iodine (BETADINE) 10 % external solution, , Topical, Daily  [Held by provider] heparin (porcine) injection 5,000 Units, 5,000 Units, SubCUTAneous, 3 times per day  heparin (porcine) injection 3,000 Units, 3,000 Units, IntraVENous, PRN  sodium chloride 0.9 % irrigation 1,000 mL, 1,000 mL, Irrigation, Continuous PRN  albumin human 25 % IV solution 12.5 g, 12.5 g, IntraVENous, PRN  heparin (porcine) injection 2,600 Units, 2,600 Units, IntraCATHeter, PRN  sodium chloride flush 0.9 % injection 5-40 mL, 5-40 mL, IntraVENous, 2 times per day  sodium chloride flush 0.9 % injection 5-40 mL, 5-40 mL, IntraVENous, PRN  0.9 % sodium chloride infusion, 25 mL, IntraVENous, PRN  acetaminophen (TYLENOL) tablet 650 mg, 650 mg, Oral, Q6H PRN **OR** acetaminophen (TYLENOL) suppository 650 mg, 650 mg, Rectal, Q6H PRN  glucose (GLUTOSE) 40 % oral gel 15 g, 15 g, Oral, PRN  glucagon (rDNA) injection 1 mg, 1 mg, IntraMUSCular, PRN  dextrose 5 % solution, 100 mL/hr, IntraVENous, PRN  dextrose bolus (hypoglycemia) 10% 125 mL, 125 mL, IntraVENous, PRN **OR** dextrose bolus (hypoglycemia) 10% 250 mL, 250 mL, IntraVENous, PRN     This is day 22 of Enterococcal therapy, day 33 of MSSA therapy, POD 23 for the foot, POD 19 for the hand.      Allergies: Januvia [sitagliptin], Metformin and related, Vancomycin, and Mustard oil [allyl isothiocyanate]    Pertinent items from the review of systems are discussed in the HPI; the remainder of the ROS was reviewed and is negative.      Objective:     Vital signs over the last 24 hours:  Temp  Av.7 °F (37.6 °C)  Min: 98.3 °F (36.8 °C)  Max: 100.4 °F (38 °C)  Pulse  Av.7  Min: 81  Max: 204  Systolic (97BRP), MEM:664 , Min:115 , MAB:040   Diastolic (18LJG), BPL:67, Min:66, Max:90  Resp  Avg: 15.2  Min: 12  Max: 21  SpO2  Av %  Min: 94 %  Max: 100 %    Constitutional:  well-developed, well-nourished, alert, not as weak and fatigued  Neuropsych: more alert, more interactive, speaking more, more personality coming back, not anxious or agitated  Eyes:  pupils equal, round, reactive to light; sclerae anicteric, conjunctivae pale  ENT:  atraumatic; no labial ulcers; no thrush; NGT in place; mucous membranes relatively moist  Resp:  decreased at the bases, clearer overall  Cardiovascular:  heart regular, no murmur; generally mild extremity edema; right PICC in place, and a right IJ HD line, exit sites benign  GI:  abdomen soft, nontender, mildly distended, good bowel sounds, no palpable masses or organomegaly; rectal tube in place, with ongoing diarrhea  : Munoz in place, increasing amount of clearer yellow urine now  Musculoskeletal:  no clubbing, cyanosis or petechiae; extremities with no gross effusions or acute arthritis  Skin: warm, dry, normal turgor; no acute rash, no peripheral stigmata of endocarditis. wounds not examined today.   ______________________________    Recent Labs     22  0352 22  0348 22  0340   WBC 10.1 10.9 12.0*   HGB 6.6* 7.1* 7.5*   HCT 18.8* 20.9* 21.8*   MCV 89.4 89.8 89.5    336 310     Lab Results   Component Value Date    CREATININE 5.7 (HH) 2022     Lab Results   Component Value Date    LABALBU 3.0 (L) 2022     Lab Results   Component Value Date    ALT <5 (L) 02/10/2022    AST 8 (L) 02/10/2022     (H) 2022    ALKPHOS 217 (H) 02/10/2022    BILITOT 0.5 02/10/2022      Lab Results   Component Value Date    LABA1C 10.6 01/23/2022     Other recent pertinent labs: Anion gap 14. WBC diff normal. 300 Eos. Glucoses 100s - 200s. cRP recently from 136 to 71 to 70. ESR recently from > 120 to 94, back to > 120.  ______________________________    Recent pertinent micro results:  Nothing new this week.  ______________________________    Recent imaging results (last 7 days):     No results found.      Assessment:     Patient Active Problem List   Diagnosis Code    Hypertension I10    Uncontrolled type 2 diabetes mellitus with diabetic polyneuropathy ([facility]) E11.42, E11.65    Cardiomyopathy ([facility] I42.9    Mixed hyperlipidemia E78.2    Allergic rhinitis J30.9    Osteoarthritis M19.90    Acute osteomyelitis of left foot ([facility] M86.172    RODRICK (acute kidney injury) ([facility] N17.9    MSSA bacteremia R78.81, B95.61    Third degree burn of left hand T23.302A    Cardiopulmonary arrest ([facility]) I46.9    Hypertriglyceridemia E78.1    Staphylococcal arthritis of left foot ([facility]) M00.072    Antibiotic-associated diarrhea K52.1, T36.95XA    Enterococcal infection A49.1    Acute osteomyelitis of left hand ([facility]) M86.142    Pressure injury of deep tissue of sacral region L89.156    Severe protein-calorie malnutrition ([facility]) E43    Paroxysmal atrial fibrillation ([facility]) I48.0    Abnormal chest x-ray R93.89    Upper GI bleeding K92.2    Duodenal ulcer K26.9    Diabetic ulcer of left midfoot associated with type 2 diabetes mellitus, with necrosis of bone ([facility]) E11.621, L97.424    Acute GI bleeding K92.2     Assessment of today's active condition(s):      --          Background of uncontrolled DM2, neuropathy, no known PAD, multiple prior diabetic foot ulcers, infections, surgeries. Most recent wounds were at the left 1st and 2nd ray, but they had been healed for just about a year.      --          Admission this time with septic shock related primarily to deep MSSA foot infection (cellulitis, abscess, septic arthritis, acute osteo), with acute renal failure, lactic acidosis, then cardiac arrest, resuscitation, acute respiratory failure, rapid Afib. Shock resolved, respiratory failure resolved (I think perhaps mostly due to CHF / fluid overload after cardiac arrest). Initial sepsis finally resolved, after aggressive OR treatment of left foot and left hand infections. Left foot wound overall doing better, still some necrotic deep soft tissue and bone - repeat debridement at bedside on Monday.      --          Ongoing ARF, on intermittent HD, but U/O improving a bit.      --          Third degree burn of left hand, with purulence beneath the lysing eschar seen to be running along extensor tendons - MRI and clinical exams c/w soft tissue abscess, septic tenosynovitis, possible acute septic arthritis at the 3rd MCPJ, with adjacent acute metacarpal and phalangeal osteo. Definitely need to treat the Enterococcus, with it isolated from OR Cxs. Much improved this last week, pus gone, tunnels closed.     -- Resolved encephalopathy, likely multifactorial (cardiac arrest, ICU stay, sedatives, sepsis, renal failure, etc). Also wondering if he might not have a significant degree of critical-illness myopathy and/or neuropathy. Peripheral strength does seem better today than late last week, at least distal UE's.      --          Colonization with Candida, not surprising given DM, prior steroids, extensive Abx Rx.      --          Unstageable pressure ulcer of sacrum and scalp -- conservative Rx for now, with [facility] - looking a bit better.      --          About 2 weeks ago a setback with difficulty in clearing secretions, hypoxemia, worsening mental status, rapid Afib, reintubation. I think this was more of an issue of retained secretions and perhaps aspiration pneumonitis, as opposed to a true invasive pneumonia.  Respiratory function seemed to improve quickly after bronch and supportive care, nothing clearly new on CXR, FIO2 has come down, WBC quickly back down to normal, and nothing on bronch wash / BAL. Now extubated, and looking better than he has recently. Off O2 now. Swallow eval to be done.     --          Persistent / recurrent anemia, heme (+), large DU found at EGD. Still requiring PRBCs at times.     --          Multifactorial diarrhea (antibiotics, tube feeds, GI bleed. ...). Negative for Cdiff a couple of weeks ago.      --          Likely just a reactive leukocytosis and low-grade fever now, related to wounds, GI bleeding, perhaps a small lung abscess, areas of atelectasis, possible intermittent aspiration etc. Would just follow for now, watch clinically for signs of new nosocomial infection.     Treatment recs:     No change in Abx. Follow labs periodically; watch for thrush, Cdiff, line infection, allergic reaction, etc. Not sure how accurate his ESRs really are, with the severe anemia and renal failure -- will rely more on cRP for Abx decisions in the coming couple of weeks.      Getting HD still. When renal function improves enough, will adjust Abx dose accordingly.     No change in wound care for right now. Might need another left foot debridement at some point, but I'll see what he looks like next Monday. Planning for follow-up in the wound care center, once he's well enough to leave the hospital.     Swallow evaluation still needs to be done, and he'll need a lot of work with PT and OT. I'll check his wounds again on Monday with [employer].     -------------------------------    I was not necessarily going to be in the hospital over the weekend to see Mr. Vonda Curry. Will keep an eye on Epic from home.  Please call or text if there are any urgent concerns over the weekend with his clinical course, test results, etc.    Electronically signed by Wilma Reyna MD on 2/24/2022 at 8:37 AM.

## 2022-02-25 NOTE — PROGRESS NOTES
Pulmonary & Critical Care Medicine ICU Progress Note    CC: Septic shock, MSSA bacteremia, left foot abscess    Events of Last 24 hours:    Streaks of blood noted in stool  HD  Diet advance per speech therapy  CT chest    Vascular lines:    2/7/2022 RUE PICC  2/11/2022 right IJ HD cath    MV:   1/23/22 - 2/5/22; extubated and re-intubated due to unable to clear secretions/hypoxia on 2/5/22 after less than 12 hours  2/5-2/9/22; reintubated 2/13 for svt and resp distress with secretions  2/13/22 - 2/17/22        Intake/Output Summary (Last 24 hours) at 2/25/2022 0646  Last data filed at 2/25/2022 0520  Gross per 24 hour   Intake 753.27 ml   Output 1600 ml   Net -846.73 ml       BP 96/64   Pulse 103   Temp 100 °F (37.8 °C) (Bladder)   Resp 17   Ht 6' 1\" (1.854 m)   Wt 231 lb 7.7 oz (105 kg)   SpO2 98%   BMI 30.54 kg/m²  RA  Gen: No distress. Eyes: PERRL. No sclera icterus. No conjunctival injection. ENT: No discharge. Pharynx clear. Neck: Trachea midline. No obvious mass. Resp: No accessory muscle use. Fwe crackles. No wheezes. Few rhonchi. No dullness on percussion. Good air entry. CV: Regular rate. Regular rhythm. No murmur or rub. 1 + LE edema. GI: Non-tender. Non-distended. No hernia. Skin: Warm and dry. No nodule on exposed extremities. Lymph: No cervical LAD. No supraclavicular LAD. M/S: No cyanosis. No joint deformity. No clubbing. Neuro: alert, oriented to person. Following commands all 4 extremities. Psych: No anxiety or agitation.          Scheduled Meds:   insulin glargine  20 Units SubCUTAneous BID    ampicillin-sulbactam  3,000 mg IntraVENous 2 times per day    epoetin angeles-epbx  10,000 Units IntraVENous Once per day on Tue Thu Sat    sucralfate  1 g Oral 4x Daily AC & HS    pantoprazole  40 mg IntraVENous BID    dilTIAZem  30 mg Oral 4 times per day    b complex-C-folic acid  1 capsule Oral Daily    carvedilol  12.5 mg Oral BID WC    insulin lispro  0-18 Units SubCUTAneous Q4H    povidone-iodine   Topical Daily    [Held by provider] heparin (porcine)  5,000 Units SubCUTAneous 3 times per day    sodium chloride flush  5-40 mL IntraVENous 2 times per day       Data:  CBC:   Recent Labs     02/23/22  0348 02/23/22  0348 02/24/22  0352 02/24/22  1000 02/25/22  0403   WBC 10.9  --  10.1  --  14.5*   HGB 7.1*   < > 6.6* 7.3* 5.5*   HCT 20.9*   < > 18.8* 20.9* 16.8*   MCV 89.8  --  89.4  --  91.6     --  322  --  306    < > = values in this interval not displayed. BMP:   Recent Labs     02/23/22  0348 02/24/22  0352 02/25/22  0403    137 137   K 4.2 4.1 4.8   CL 98* 99 101   CO2 25 24 25   PHOS 3.9 4.4 4.0   BUN 54* 75* 47*   CREATININE 4.6* 5.7* 3.8*     LIVER PROFILE:   No results for input(s): AST, ALT, LIPASE, BILIDIR, BILITOT, ALKPHOS in the last 72 hours. Invalid input(s): AMYLASE,  ALB    Microbiology:  1/22 [facility] MSSA  1/22 COVID-19 and influenza negative  1/23/22 Blood cx: NGTD  1/23 tracheal aspirate MSSA  1/24 Wound cx: MSSA  1/24 BC MSSA  1/29/22 BAL pending  1/30/2022 blood sent  1/31/2022 left hand Enterococcus and staph aureus  2/1/2022 bone MSSA  2/5/2022 surgical hand culture E.  Faecalis  2/13 BAL NRF   2/13 BC NG      Imaging:   CT chest 2/24/2022  Cavitary right upper lobe pneumonia    Echo 1/25/22:   EF 35%, global HK, reduced RV function    ASSESSMENT:  · Acute hypoxemic respiratory failure - recurrent and has been intubated 3 times  · VAP LLL   · Abnormal CXR with R mid lung cavitary lesion - likely septic emboli from recent bacteremia  · Acute GIB - EGD 2/17 3.5 cm gastric and duodenal bulb ulcer  · Cardiopulmonary arrest x 2 1/23/2022, required CPR, TTM - rewarmed 8 pm 1/25/22  · Acute kidney failure  · MSSA bacteremia  · DM with hyperglycemia   · L foot abscess drained 1/24/2022, MSSA; MRI with large fluid collection and changes c/w osteomyelitis, s/p debridement by Dr. Jaylin Stanton on 2/1/22  · L hand burn with deep tissue injury & abscess, s/p debridement on 2/5/22  · Paroxysmal AFIB/flutter with RVR  · Cardiomyopathy EF 35% on Echo 1/24/22     PLAN:  Continue Unasyn per ID D#9  Nephrology following for HD  Diet per SLP  NG should be advanced if not already done, could be removed as soon as cleared by speech therapy to do so  Lantus 20 BID   Percocet PRN for pain   Protonix BID per Gastroenterology, will notify GI of possible blood in stool  1 U PRBC 2/24/22; 2 U PRBC 2/25/22  Check INR, fibrinogen  Remove PICC when able    SCDs  Remain in ICU today

## 2022-02-25 NOTE — CARE COORDINATION
INTERDISCIPLINARY PLAN OF CARE CONFERENCE    Date/Time: 2/25/2022 8:54 AM  Completed by: ROSALIA Rich  Case Management    Patient Name:  Day Richardson  YOB: 1975  Admitting Diagnosis: Hyperglycemia [R73.9]  RODRICK (acute kidney injury) ([facility] [address] [N17.9]  Acute renal failure, unspecified acute renal failure type ([facility] [address] [N17.9]  Syncope, unspecified syncope type [R55]     Admit Date/Time:  1/22/2022  1:32 PM    Chart reviewed. Interdisciplinary team contacted or reviewed plan related to patient progress and discharge plans. Disciplines included Case Management, Nursing, and Dietitian. Current Status:Ongoing. HD. PT/OT recommendation for discharge plan of care: SNF    Expected D/C Disposition:  Long term acute care hospital vs SNF    Discharge Plan Comments: Chart review completed. Pt remains in the ICU. Completed Interdisciplinary rounds with ICU staff; Dr. Rhea Beltran states to have insurance started Monday pending medical status as pt is not medically ready. Pt has been accepted clinically to [facility] but pt needs cert and unsure when they will have an HD bed. CM will continue to follow and assist. Please notify CM if needs or concerns arise.     Home O2 in place on admit: No    Addendum at 11:22am: Spoke with Karina at [employer] who is aware to not start precert until at least Monday pending medical status

## 2022-02-25 NOTE — OP NOTE
Brief Postoperative Note    Virgilio Carter  YOB: 1975  [id number]    Pre-operative Diagnosis: large duodenal ulcer with acute blood loss anemia. Inability to treat endoscopically due to size. Emperic embolization of the gastroduodenal artery and mesenteric angiogram.    Post-operative Diagnosis: Same    Procedure: mesenteric angiogram and embolization of the gastroduodenal artery. Anesthesia: Moderate Sedation    Surgeons/Assistants: Dr. Jonathan Sorto    Estimated Blood Loss: less than 5ml     Complications: None    Specimens: Was Not Obtained    Findings: successful coil embolization of the gastroduodenal artery. SMA and BRANDY injected. No evidence of acute GI bleed.     Electronically signed by Rhea Figueroa MD on 2/25/2022 at 1:11 PM

## 2022-02-25 NOTE — PROGRESS NOTES
Patient transported to dialysis on telemetry at 1530, and remains in dialysis during shift change. Dialysis RN reported patient will be in dialysis for approximately an additional 30 minutes. RN give PRN medication per eMar while patient was in dialysis for pain and nausea. Shift report given to Handy.

## 2022-02-25 NOTE — FLOWSHEET NOTE
02/25/22 1514   Negative Pressure Wound Therapy Foot Left;Dorsal   Placement Date/Time: 02/02/22 1100   Pre-existing: No  Inserted by: Donny Screen CWOCN  Location: Foot  Wound Location Orientation: Left;Dorsal   $ Standard NPWT >50 sq cm PER TX $ Yes   Wound Type Surgical   Unit Type Vac Ulta with Veraflo   Dressing Type Black foam   Number of pieces used 1   Cycle Continuous   Target Pressure (mmHg) 125   Intensity 1   Irrigation Solution Sodium chloride 0.9%   Instillation Volume  14 mL   Soak Time  3 minutes   Vac Frequency 2 hours   Canister changed? Yes   Dressing Status Changed   Dressing Changed Changed/New   Drainage Amount Scant   Drainage Description Serous   Dressing Change Due 02/28/22   Wound Assessment Granulation tissue;Red; Other (Comment)  (moist tendon and grayish bone exposed)   Odor None       Pt seen for vac dressing change. Left dorsal foot vac dressing removed, wound cleansed and patted dry. One black foam removed. Periwound skin prepped using barrier wipe and vac drape. One black foam used in wound bed. Good seal obtained. Veraflo setttings unchanged. Next vac change on Monday.

## 2022-02-25 NOTE — PROGRESS NOTES
Speech Language Pathology  SLP Attempt Note        Name: Alessandro Gomez  : 1975  Medical Diagnosis: Hyperglycemia [R73.9]  RODRICK (acute kidney injury) ([facility] [address] [N17.9]  Acute renal failure, unspecified acute renal failure type ([facility] [address] [N17.9]  Syncope, unspecified syncope type [R55]    Attempted to see pt for dysphagia follow-up. Pt was [facility] floor IR procedure. Will f/u at later time as schedule allows. No charges filed.  Thank you,    Prema Catalan M.A., [facility]  Speech-Language Pathologist  Phone: [phone], [phone]

## 2022-02-25 NOTE — PROGRESS NOTES
PROGRESS NOTE  S:46 yrs Patient  admitted on 1/22/2022 with Hyperglycemia [R73.9]  RODRICK (acute kidney injury) ([facility] [address] [N17.9]  Acute renal failure, unspecified acute renal failure type ([facility] [address] [N17.9]  Syncope, unspecified syncope type [R55] . Today he complains of nausea. He developed hematochezia per [employer] last night which persisted into this afternoon. Exam:   Vitals:    02/25/22 1500   BP: 124/74   Pulse: 104   Resp: 13   Temp: 100.2 °F (37.9 °C)   SpO2: 99%      General appearance: alert, appears stated age, cooperative, fatigued, pale and syndromic appearance - chronically ill appearing  HEENT: Neck supple with midline trachea  Neck: no adenopathy and supple, symmetrical, trachea midline  Lungs: clear to auscultation bilaterally  Heart: regular rate and rhythm, S1, S2 normal, no murmur, click, rub or gallop  Abdomen: soft, non-tender; bowel sounds normal; no masses,  no organomegaly  Extremities: extremities normal, atraumatic, no cyanosis or edema     Medications: Reviewed    Labs:  CBC:   Recent Labs     02/23/22 0348 02/23/22 0348 02/24/22  0352 02/24/22  0352 02/24/22  1000 02/25/22  0403 02/25/22  1130   WBC 10.9  --  10.1  --   --  14.5*  --    HGB 7.1*   < > 6.6*   < > 7.3* 5.5* 6.0*   HCT 20.9*   < > 18.8*   < > 20.9* 16.8* 17.6*   MCV 89.8  --  89.4  --   --  91.6  --      --  322  --   --  306  --     < > = values in this interval not displayed. BMP:   Recent Labs     02/23/22 0348 02/24/22  0352 02/25/22  0403    137 137   K 4.2 4.1 4.8   CL 98* 99 101   CO2 25 24 25   PHOS 3.9 4.4 4.0   BUN 54* 75* 47*   CREATININE 4.6* 5.7* 3.8*     LIVER PROFILE: No results for input(s): AST, ALT, LIPASE, PROT, BILIDIR, BILITOT, ALKPHOS in the last 72 hours. Invalid input(s):   AMYLASE,  ALB  PT/INR:   Recent Labs     02/25/22  1007   INR 1.20*       IMAGING:  IR ANGIOGRAM SUPERIOR MESENTERIC  Impression   No active extravasation or pseudoaneurysm formation.       Successful coil embolization of the gastroduodenal artery. CTA ABDOMEN PELVIS W WO CONTRAST  Impression   Poor opacification of the abdominal aorta and its branches.       No evidence of active GI bleed on this study, though study is limited   secondary to presence of contrast and other radiopaque material within the   large bowel.       RECOMMENDATIONS:   Unavailable       Pre-operative Diagnosis: large duodenal ulcer with acute blood loss anemia. Inability to treat endoscopically due to size. Emperic embolization of the gastroduodenal artery and mesenteric angiogram.  Post-operative Diagnosis: Same  Procedure: mesenteric angiogram and embolization of the gastroduodenal artery. Anesthesia: Moderate Sedation  Surgeons/Assistants: Dr. Tawanna Meyer  Findings: successful coil embolization of the gastroduodenal artery. SMA and BRANDY injected. No evidence of acute GI bleed. Attending Supervising [de-identified] Attestation Statement  The patient is a 42 y.o. male. I have performed a history and physical examination of the patient. I discussed the case with my physician assistant Teresa Diaz PA-C    I reviewed the patient's Past Medical History, Past Surgical History, Medications, and Allergies.      Physical Exam:  Vitals:    02/25/22 1445 02/25/22 1500 02/25/22 1600 02/25/22 1700   BP: 115/83 124/74 118/76 112/77   Pulse: 101 104 104 102   Resp: 15 13 12 16   Temp: 100.2 °F (37.9 °C) 100.2 °F (37.9 °C) 100.1 °F (37.8 °C)    TempSrc: Bladder Bladder Bladder    SpO2: 96% 99% 99% 99%   Weight:       Height:           Physical Examination: General appearance - chronically ill appearing  Mental status - drowsy  Eyes - pupils equal and reactive, extraocular eye movements intact  Neck - supple, no significant adenopathy  Chest - clear to auscultation, no wheezes, rales or rhonchi, symmetric air entry  Heart - normal rate, regular rhythm, normal S1, S2, no murmurs, rubs, clicks or gallops  Abdomen - soft, nontender, nondistended, no masses or organomegaly  Extremities - no pedal edema noted          Impression: 42year old male with a history of HTN, DM, A fib, and nonischemic cardiomyopathy admitted with septic shock secondary to MSSA bacteremia and L foot abscess complicated by acute respiratory failure and RODRICK. Hospitalization c/b cardiorespiratory arrest, RODRICK requiring HD, and acute on chronic anemia with heme+stool. EGD showed large duodenal ulcer without active bleeding. He developed hematochezia last night s/p coil embolization of gastroduodenal artery this afternoon. Recommendation:  1. Continue supportive care  2. Monitor Hgb   3. Observe for signs of bleeding  4. Transfuse as needed if Hgb < 7.0  5. Continue Pantoprazole BID and Carafate QID  6. Hold anticoagulation x 1-2 weeks following EGD   7. Hold TFs per NG  8. Advance diet per SLP evaluation   9. Pulmonology, wound care, and ID following   10. HD per nephrology  11. Will follow   12. Will consider resuming TFs tomorrow       Kate Santos PA-C  3:57 PM 2/25/2022                      42year old male with a history of HTN, DM, A fib, and nonischemic cardiomyopathy admitted with septic shock secondary to MSSA bacteremia and L foot abscess complicated by acute respiratory failure and RODRICK. Hospitalized c/b cardiorespiratory arrest, RODRICK requiring HD and GI bleed secondary to large DU s/p IR guided coil embolization of GDA     Continue supportive care. PPI drip x 48h. Restart TFs tomorrow and advance to goal rate. Monitor Hgb and observe for signs of bleeding.  Transfuse pRBC to keep Hgb >7    Ethel Jensen MD          (O) [phone]  Cnidy Cheng
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
## 2022-02-25 NOTE — PROGRESS NOTES
Occupational Therapy  Patient asleep and receiving wound care upon therapist arrival. Patient recently off floor for IR procedure. Will follow up this weekend when patient is more alert and can participate more in therapy.        Carmelita OTR/L #[id number]

## 2022-02-25 NOTE — PROGRESS NOTES
Nephrology Progress Note   [email or web address]      This patient is a 42year old male whom we are following for RODRICK, HD dependent. Subjective:    Remains in the ICU. Acute GI bleed with HB down to 5.5  Went to IR for embolization of the gastroduodenal artery   Some urine output not has not recovered renal function     ROS: Very weak, no fevers, non-oliguric   Social: No family at bedside. Vitals:  /76   Pulse 104   Temp 100.1 °F (37.8 °C) (Bladder)   Resp 12   Ht 6' 1\" (1.854 m)   Wt 231 lb 7.7 oz (105 kg)   SpO2 99%   BMI 30.54 kg/m²   I/O last 3 completed shifts:   In: 1920.3 [Blood:351; NG/GT:850; IV Piggyback:319.3]  Out: 1850 [Urine:450]  I/O this shift:  In: -   Out: 100 [Urine:100]    Physical Exam:  Gen: Resting  Cardiovascular:  S1, S2 without m/r/g trace lower extremity edema  Respiratory: Decreased breath sounds  Abdomen:  soft, nt, nd,   Neuro/Psy: Off sedation  Access: R IJ VasCath      Medications:   insulin glargine  20 Units SubCUTAneous BID    ampicillin-sulbactam  3,000 mg IntraVENous 2 times per day    epoetin angeles-epbx  10,000 Units IntraVENous Once per day on Tue Thu Sat    sucralfate  1 g Oral 4x Daily AC & HS    pantoprazole  40 mg IntraVENous BID    dilTIAZem  30 mg Oral 4 times per day    b complex-C-folic acid  1 capsule Oral Daily    carvedilol  12.5 mg Oral BID WC    insulin lispro  0-18 Units SubCUTAneous Q4H    povidone-iodine   Topical Daily    [Held by provider] heparin (porcine)  5,000 Units SubCUTAneous 3 times per day    sodium chloride flush  5-40 mL IntraVENous 2 times per day         Labs:  Recent Labs     02/23/22  0348 02/23/22  0348 02/24/22  0352 02/24/22  0352 02/24/22  1000 02/25/22  0403 02/25/22  1130   WBC 10.9  --  10.1  --   --  14.5*  --    HGB 7.1*   < > 6.6*   < > 7.3* 5.5* 6.0*   HCT 20.9*   < > 18.8*   < > 20.9* 16.8* 17.6*   MCV 89.8  --  89.4  --   --  91.6  --      --  322  --   --  306  --     < > = values in this interval not displayed. Recent Labs     02/23/22  0348 02/24/22  0352 02/25/22  0403    137 137   K 4.2 4.1 4.8   CL 98* 99 101   CO2 25 24 25   GLUCOSE 194* 196* 198*   PHOS 3.9 4.4 4.0   BUN 54* 75* 47*   CREATININE 4.6* 5.7* 3.8*   LABGLOM 14* 11* 17*   GFRAA 17* 13* 21*            Assessment/      RODRICK likely related to prerenal factors in the setting of sepsis, use of diuretics and losartan contributing to multifactorial ATN. Jb Shayalpeshsabine is not suggestive of staph associated glomerulonephritis.  Patient did not respond to IV fluids and developed acute pulmonary edema and renal replacement therapy initiated on 1/23/22        Now had dialysis dependence x 4 weeks, making some urine but limited recovery         On TTS schedule      -Acute pulmonary edema   Resolved with dialysis / now looks euvolemic      -S/P Cardio respiratory arrest    Prolonged hospital stay       -Sepsis with MSSA bacteremia with left foot abscess s/p drainage, osteomyelitis, left hand I&D   Low-grade fever on 2/19     -Anemia - prn prbc's     -Diabetes, uncontrolled     -Hypertension    -Weakness:  Prolonged ICU stay following MSSA bacteremia complicated by cardiac arrest.  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 Went to IR for embolization of the gastroduodenal artery        Plan/     -HD on TTS schedule   Use Nepro for tube feeds   Monitor signs of renal recovery   Needs TDC at some point, given low grade fevers I will discuss with ID as to the timing   -Retacrit to 10,000 units qHD.   Blood transfusion   -renal dose medications   -avoid nephrotoxins
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
## 2022-02-25 NOTE — PROGRESS NOTES
Embolization procedure complete. Patient tolerated well. Patient transported bact to ICU with RN present. Bedside report given to Paulo Bhatia RN. Care transferred.   Sami Richardson RN

## 2022-02-25 NOTE — FLOWSHEET NOTE
PATIENT YEIMI CARE COMPLETED, NOTICED STOOL APPEARS TO HAVE STREAKS OF BLOOD IN THE STOOL WILL LET DR. Yaquelin Ribera.  AWARE

## 2022-02-25 NOTE — PRE SEDATION
Sedation Pre-Procedure Note    Patient Name: Cassie Gillette   YOB: 1975  Room/Bed: 3013/3013-01  Medical Record Number: [id number]  Date: 2/25/2022   Time: 11:41 AM       Indication:  Large duodenal ulcer on endoscopy with continued GI bleed requiring multiple transfusions. Ulcer is too large for endoscopic management. Bright red blood per rectum this am with significant drop in Hg. Request for gastroduodenal artery embolization. Consent: I have discussed with the patient and/or the patient representative the indication, alternatives, and the possible risks and/or complications of the planned procedure and the anesthesia methods. The patient and/or patient representative appear to understand and agree to proceed. Vital Signs:   Vitals:    02/25/22 1100   BP: 104/63   Pulse: 103   Resp: 13   Temp: 99.6 °F (37.6 °C)   SpO2: 100%       Past Medical History:   has a past medical history of Abscess of left foot, Abscess of left hand, Acute encephalopathy, Acute hypoxemic respiratory failure ([address] Acute osteomyelitis of right hallux (HCC), Cellulitis of left foot, CHF (congestive heart failure) ([address] Chronic osteomyelitis of left foot ([address] Closed displaced fracture of distal phalanx of right little finger, Clostridium difficile infection, Diabetic ulcer of left forefoot associated with type 2 diabetes mellitus, with necrosis of bone ([address] Diabetic ulcer of right great toe associated with type 2 diabetes mellitus, with necrosis of bone ([address] Failed soft 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 toe amputation site, History of hyperbaric oxygen therapy, Infective tenosynovitis of extensor tendons of left hand, Migraine, Possible perforated tympanic membrane, Post-op hematoma of left foot, Recurrent otitis media, Septic shock (HCC), Syncope, Tear of medial meniscus of left knee, Tobacco use, Toe osteomyelitis, left ([address] and VAP (ventilator-associated pneumonia) ([address]     Past Surgical History:   has a past surgical history that includes Tonsillectomy; [place] tooth extraction; Knee arthroscopy (Left, [id number]); Toe amputation (Right, 8/23/2019); other surgical history (Left, 07/24/2020); Toe amputation (Left, 7/24/2020); Toe amputation (Left, 10/22/2020); Foot Debridement (Left, 2/1/2022); Arm Surgery (Left, 2/5/2022); IR NONTUNNELED VASCULAR CATHETER > 5 YEARS (2/11/2022); and Upper gastrointestinal endoscopy (N/A, 2/17/2022). Medications:   Scheduled Meds:    desmopressin (DDAVP) IVPB  0.3 mcg/kg IntraVENous Once    insulin glargine  20 Units SubCUTAneous BID    ampicillin-sulbactam  3,000 mg IntraVENous 2 times per day    epoetin angeles-epbx  10,000 Units IntraVENous Once per day on Tue Thu Sat    sucralfate  1 g Oral 4x Daily AC & HS    pantoprazole  40 mg IntraVENous BID    dilTIAZem  30 mg Oral 4 times per day    b complex-C-folic acid  1 capsule Oral Daily    carvedilol  12.5 mg Oral BID WC    insulin lispro  0-18 Units SubCUTAneous Q4H    povidone-iodine   Topical Daily    [Held by provider] heparin (porcine)  5,000 Units SubCUTAneous 3 times per day    sodium chloride flush  5-40 mL IntraVENous 2 times per day     Continuous Infusions:    sodium chloride      sodium chloride      sodium chloride      sodium chloride Stopped (02/07/22 0806)    dextrose       PRN Meds: sodium chloride, sodium chloride, ondansetron, oxyCODONE-acetaminophen, heparin (porcine), sodium chloride, albumin human, heparin (porcine), sodium chloride flush, sodium chloride, acetaminophen **OR** acetaminophen, glucose, glucagon (rDNA), dextrose, dextrose bolus (hypoglycemia) **OR** dextrose bolus (hypoglycemia)  Home Meds:   Prior to Admission medications    Medication Sig Start Date End Date Taking?  Authorizing Provider   Insulin Degludec (TRESIBA FLEXTOUCH) 200 UNIT/ML SOPN INJECT 76 UNITS INTO THE SKIN NIGHTLY 1/17/22  Yes Leandro Walker MD   losartan (COZAAR) 50 MG tablet TAKE 1 TABLET BY MOUTH DAILY 1/13/22  Yes Virgilio Miramontes Farzana Mandel MD   tiZANidine (ZANAFLEX) 4 MG tablet TAKE 2 TABLETS BY MOUTH NIGHTLY 1/4/22  Yes Josue Elizabeth MD   carvedilol (COREG) 12.5 MG tablet TAKE 1 TABLET BY MOUTH 2 TIMES DAILY (WITH MEALS) 12/30/21  Yes Josue Elizabeth MD   rosuvastatin (CRESTOR) 20 MG tablet TAKE 1 TABLET BY MOUTH NIGHTLY 12/30/21  Yes Patricia Galvan MD   traZODone (DESYREL) 50 MG tablet TAKE 1 TABLET BY MOUTH NIGHTLY 12/7/21  Yes Josue Elizabeth MD   furosemide (LASIX) 20 MG tablet TAKE 1 TABLET BY MOUTH DAILY 12/7/21  Yes Josue Elizabeth MD   gabapentin (NEURONTIN) 400 MG capsule TAKE 2 CAPSULES BY MOUTH 3 TIMES DAILY FOR 90 DAYS. 11/30/21 2/28/22 Yes Josue Elizabeth MD   insulin lispro (HUMALOG KWIKPEN) 200 UNIT/ML SOPN pen Inject 25 Units into the skin 3 times daily (before meals) 10/14/21  Yes Josue Elizabeth MD   glimepiride (AMARYL) 4 MG tablet TAKE 1 TABLET BY MOUTH EVERY MORNING (BEFORE BREAKFAST). 9/10/21  Yes Josue Elizabeth MD   PARoxetine (PAXIL) 10 MG tablet TAKE 1 TABLET BY MOUTH EVERY MORNING 9/10/21  Yes Josue Elizabeth MD   blood glucose test strips (ONETOUCH ULTRA) strip USE TO TEST BLOOD GLUCOSE DAILY. DX:E11.9 3/5/21  Yes Josue Elizabeth MD   Blood Glucose Monitoring Suppl (CONTOUR NEXT EZ) w/Device KIT Use to test glucose daily. DX:E11.9 12/10/20  Yes Josue Elizabeth MD   Insulin Pen Needle 32G X 6 MM MISC Use with insulin daily . DX:E11.9 12/4/20  Yes Josue Elizabeth MD   blood glucose monitor strips Use to test blood sugar daily.   DX:E11.9 12/4/20  Yes Josue Elizabeth MD   oxymetazoline (AFRIN) 0.05 % nasal spray 2 sprays by Nasal route daily Indications: prior to HBO   Yes Historical Provider, MD   loratadine (CLARITIN) 10 MG tablet Take 10 mg by mouth nightly as needed    Yes Historical Provider, MD   sildenafil (VIAGRA) 100 MG tablet TAKE 1 TABLET BY MOUTH AS NEEDED FOR ERECTILE DYSFUNCTION 8/27/19  Yes Josue Elizabeth MD Coumadin Use Last 7 Days:  no  Antiplatelet drug therapy use last 7 days: no  Other anticoagulant use last 7 days: no  Additional Medication Information:        Pre-Sedation Documentation and Exam:   I have reviewed the patient's history and review of systems.     Mallampati Airway Assessment:  normal neck range of motion    Prior History of Anesthesia Complications:   none    ASA Classification:  Class 3 - A patient with severe systemic disease that limits activity but is not incapacitating    Sedation/ Anesthesia Plan:   intravenous sedation    Medications Planned:   midazolam (Versed) intravenously and fentanyl intravenously    Patient is an appropriate candidate for plan of sedation: yes    Electronically signed by Tae Molina MD on 2/25/2022 at 11:41 AM

## 2022-02-25 NOTE — PROGRESS NOTES
[facility] Infectious Disease Progress Note      Cassie Gillette     : 1975    DATE OF VISIT:  2022  DATE OF ADMISSION:  2022       Subjective:     Cassie Gillette is a 42 y.o. male whom I've been seeing for a deep infection of the left hand and left foot, and some ongoing (probably multifactorial) low-grade fever and leukocytosis. Since I last saw him, he had evidence of more brisk GI bleeding from that duodenal ulcer, which was not able to be treated endoscopically, so he underwent a mesenteric angiogram and gastroduodenal artery embolization earlier today. Was hypotensive for a time, did not need pressors, is getting some more PRBCs. No feeling of F/C/D; very tired and weak; some nausea; NGT remains in place. No abd pain, ongoing diarrhea, no SOB, occasional cough. Mr. Amos Lagos has a past medical history of Abscess of left foot, Abscess of left hand, Acute encephalopathy, Acute hypoxemic respiratory failure ([address] Acute osteomyelitis of right hallux ([address] Cellulitis of left foot, CHF (congestive heart failure) ([address] Chronic osteomyelitis of left foot ([address] Closed displaced fracture of distal phalanx of right little finger, Clostridium difficile infection, Diabetic ulcer of left forefoot associated with type 2 diabetes mellitus, with necrosis of bone ([address] Diabetic ulcer of right great toe associated with type 2 diabetes mellitus, with necrosis of bone ([address] Failed soft 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 toe amputation site, History of hyperbaric oxygen therapy, Infective tenosynovitis of extensor tendons of left hand, Migraine, Possible perforated tympanic membrane, Post-op hematoma of left foot, Recurrent otitis media, Septic shock (HCC), Syncope, Tear of medial meniscus of left knee, Tobacco use, Toe osteomyelitis, left ([address] and VAP (ventilator-associated pneumonia) ([address]     Current Facility-Administered Medications: 0.9 % sodium chloride infusion, , IntraVENous, PRN  insulin glargine (LANTUS) injection vial 20 Units, 20 Units, SubCUTAneous, BID  ondansetron (ZOFRAN) injection 4 mg, 4 mg, IntraVENous, Q6H PRN  ampicillin-sulbactam (UNASYN) 3000 mg ivpb minibag, 3,000 mg, IntraVENous, 2 times per day  oxyCODONE-acetaminophen (PERCOCET) 5-325 MG per tablet 1 tablet, 1 tablet, Oral, Q4H PRN  epoetin angeles-epbx (RETACRIT) injection 10,000 Units, 10,000 Units, IntraVENous, Once per day on Tue Thu Sat  sucralfate (CARAFATE) tablet 1 g, 1 g, Oral, 4x Daily AC & HS  pantoprazole (PROTONIX) injection 40 mg, 40 mg, IntraVENous, BID  dilTIAZem (CARDIZEM) tablet 30 mg, 30 mg, Oral, 4 times per day  b complex-C-folic acid (NEPHROCAPS) capsule 1 mg, 1 capsule, Oral, Daily  carvedilol (COREG) tablet 12.5 mg, 12.5 mg, Oral, BID WC  insulin lispro (HUMALOG) injection vial 0-18 Units, 0-18 Units, SubCUTAneous, Q4H  povidone-iodine (BETADINE) 10 % external solution, , Topical, Daily  [Held by provider] heparin (porcine) injection 5,000 Units, 5,000 Units, SubCUTAneous, 3 times per day  heparin (porcine) injection 3,000 Units, 3,000 Units, IntraVENous, PRN  sodium chloride 0.9 % irrigation 1,000 mL, 1,000 mL, Irrigation, Continuous PRN  albumin human 25 % IV solution 12.5 g, 12.5 g, IntraVENous, PRN  heparin (porcine) injection 2,600 Units, 2,600 Units, IntraCATHeter, PRN  sodium chloride flush 0.9 % injection 5-40 mL, 5-40 mL, IntraVENous, 2 times per day  sodium chloride flush 0.9 % injection 5-40 mL, 5-40 mL, IntraVENous, PRN  0.9 % sodium chloride infusion, 25 mL, IntraVENous, PRN  acetaminophen (TYLENOL) tablet 650 mg, 650 mg, Oral, Q6H PRN **OR** acetaminophen (TYLENOL) suppository 650 mg, 650 mg, Rectal, Q6H PRN  glucose (GLUTOSE) 40 % oral gel 15 g, 15 g, Oral, PRN  glucagon (rDNA) injection 1 mg, 1 mg, IntraMUSCular, PRN  dextrose 5 % solution, 100 mL/hr, IntraVENous, PRN  dextrose bolus (hypoglycemia) 10% 125 mL, 125 mL, IntraVENous, PRN **OR** dextrose bolus (hypoglycemia) 10% 250 mL, 250 mL, 5. 5* 7.3*   < > 6.6*   < > 7.1*   HCT 17.6* 16.8* 20.9*   < > 18.8*   < > 20.9*   MCV  --  91.6  --   --  89.4  --  89.8   PLT  --  306  --   --  322  --  336    < > = values in this interval not displayed. Lab Results   Component Value Date    CREATININE 3.8 (H) 02/25/2022     Lab Results   Component Value Date    LABALBU 2.8 (L) 02/25/2022     Lab Results   Component Value Date    ALT <5 (L) 02/10/2022    AST 8 (L) 02/10/2022     (H) 01/31/2022    ALKPHOS 217 (H) 02/10/2022    BILITOT 0.5 02/10/2022      Lab Results   Component Value Date    LABA1C 10.6 01/23/2022     Other recent pertinent labs: Anion gap 11. Glucoses in the 100s - 200s. ANC 11.7k today.   ______________________________    Recent pertinent micro results:  Nothing new this week.  ______________________________    Recent imaging results (last 7 days):     CT CHEST WO CONTRAST    Result Date: 2/24/2022  1. Right upper lobe cavitary airspace disease concerning for pneumonia. Recommend CT of the chest with contrast in 8 weeks to confirm resolution. 2. Nasogastric tube terminating in the proximal gastric body should be advanced approximately 5 cm. 3. Small left pleural effusion with atelectasis     IR ANGIOGRAM SUPERIOR MESENTERIC    Result Date: 2/25/2022  No active extravasation or pseudoaneurysm formation. Successful coil embolization of the gastroduodenal artery. FL MODIFIED BARIUM SWALLOW W VIDEO    Result Date: 2/24/2022  Swallowing mechanism grossly within normal limits without evidence of aspiration. Please see separate speech pathology report for full discussion of findings and recommendations. CTA ABDOMEN PELVIS W WO CONTRAST    Result Date: 2/25/2022  Poor opacification of the abdominal aorta and its branches. No evidence of active GI bleed on this study, though study is limited secondary to presence of contrast and other radiopaque material within the large bowel.  RECOMMENDATIONS: Unavailable Assessment:     Patient Active Problem List   Diagnosis Code    Hypertension I10    Uncontrolled type 2 diabetes mellitus with diabetic polyneuropathy ([facility]) E11.42, E11.65    Cardiomyopathy ([facility] I42.9    Mixed hyperlipidemia E78.2    Allergic rhinitis J30.9    Osteoarthritis M19.90    Acute osteomyelitis of left foot ([facility] M86.172    RODRICK (acute kidney injury) ([facility] N17.9    MSSA bacteremia R78.81, B95.61    Third degree burn of left hand T23.302A    Cardiopulmonary arrest (HCC) I46.9    Hypertriglyceridemia E78.1    Staphylococcal arthritis of left foot ([facility]) M00.072    Antibiotic-associated diarrhea K52.1, T36.95XA    Enterococcal infection A49.1    Acute osteomyelitis of left hand ([facility]) M86.142    Pressure injury of deep tissue of sacral region L89.156    Severe protein-calorie malnutrition ([facility]) E43    Paroxysmal atrial fibrillation ([facility]) I48.0    Abnormal chest x-ray R93.89    Upper GI bleeding K92.2    Duodenal ulcer K26.9    Diabetic ulcer of left midfoot associated with type 2 diabetes mellitus, with necrosis of bone ([facility]) E11.621, L97.424    Acute GI bleeding K92.2    Abnormal CXR R93.89    Gastrointestinal hemorrhage K92.2     Assessment of today's active condition(s):      --          Background of uncontrolled DM2, neuropathy, no known PAD, multiple prior diabetic foot ulcers, infections, surgeries. Most recent wounds were at the left 1st and 2nd ray, but they had been healed for just about a year.      --          Admission this time with septic shock related primarily to deep MSSA foot infection (cellulitis, abscess, septic arthritis, acute osteo), with acute renal failure, lactic acidosis, then cardiac arrest, resuscitation, acute respiratory failure, rapid Afib. Shock resolved, respiratory failure resolved (I think perhaps mostly due to CHF / fluid overload after cardiac arrest).  Initial sepsis finally resolved, after aggressive OR treatment of left foot and left hand infections. Left foot wound overall doing better, still some necrotic deep soft tissue and bone - repeat debridement at bedside on Monday.      --          Ongoing ARF, on intermittent HD, but U/O improving a bit.      --          Third degree burn of left hand, with purulence beneath the lysing eschar seen to be running along extensor tendons - MRI and clinical exams c/w soft tissue abscess, septic tenosynovitis, possible acute septic arthritis at the 3rd MCPJ, with adjacent acute metacarpal and phalangeal osteo. Definitely need to treat the Enterococcus, with it isolated from OR Cxs. Much improved this last week, pus gone, tunnels closed.     --          Resolved encephalopathy, likely multifactorial (cardiac arrest, ICU stay, sedatives, sepsis, renal failure, etc). Also wondering if he might not have a significant degree of critical-illness myopathy and/or neuropathy. Peripheral strength does seem better today than late last week, at least distal UE's.      --          Colonization with Candida, not surprising given DM, prior steroids, extensive Abx Rx.      --          Unstageable pressure ulcer of sacrum and scalp -- conservative Rx for now, with Triad - looking a bit better.      --          About 2 weeks ago a setback with difficulty in clearing secretions, hypoxemia, worsening mental status, rapid Afib, reintubation. I think this was more of an issue of retained secretions and perhaps aspiration pneumonitis, as opposed to a true invasive pneumonia. Respiratory function seemed to improve quickly after bronch and supportive care, nothing clearly new on CXR, FIO2 has come down, WBC quickly back down to normal, and nothing on bronch wash / BAL. Now extubated, and looking better than he has recently. Off O2 now. Swallow eval to be done. -- He does look to have a small RUL lung abscess on recent CXR and then CT scan.  Could have been a septic embolic process that then cavitated, or could have been an abscess that resulted from that episode of probable aspiration a couple of weeks ago.      --          Persistent / recurrent anemia, heme (+), large DU found at EGD. Still requiring PRBCs at times. Now with gastroduodenal artery embolization today.      --          Multifactorial diarrhea (antibiotics, tube feeds, GI bleed. ...). Negative for Cdiff a couple of weeks ago.      --          Likely a reactive leukocytosis and low-grade fever now, related to wounds, GI bleeding, perhaps the small lung abscess, areas of atelectasis, possible intermittent aspiration etc. Would just follow for now, watch clinically for signs of new nosocomial infection. The Unasyn would treat the lung abscess if Staph, and should also treat it if polymicrobial from recent aspiration, since only normal keith (and Candida) were recovered on bronch. Treatment recs:     No change in Abx. Follow labs periodically; watch for thrush, Cdiff, line infection, allergic reaction, etc. Not sure how accurate his ESRs really are, with the severe anemia and renal failure -- will rely more on cRP for Abx decisions in the coming couple of weeks.      Getting HD still. When renal function improves enough, will adjust Abx dose accordingly.     No change in wound care for right now. Might need another left foot debridement at some point, but I'll see what he looks like on Monday. Planning for follow-up in the wound care center, once he's well enough to leave the hospital.     Swallow evaluation still needs to be done, and he'll need a lot of work with PT and OT.      I'll check his wounds again on Monday with Trinh Kim. Eventual repeat CT scan of the chest in a couple of months, hopefully to document clearance of that presumed abscess. D/W his wife at bedside today.      -------------------------------     I was not necessarily going to be in the hospital over the weekend to see Mr. Dedra Baez. Will keep an eye on Epic from home.  Please call or text if there are any urgent concerns over the weekend with his clinical course, test results, etc.    Electronically signed by Bettina German MD on 2/25/2022 at 4:34 PM.

## 2022-02-25 NOTE — PLAN OF CARE
Nutrition Problem #1: Inadequate oral intake  Intervention: Food and/or Nutrient Delivery: Continue Current Diet,Start Oral Nutrition Supplement  Nutritional Goals: patient will consume at least 50% of his meals on ADULT DIET;  Dysphagia - Pureed; 3 carb choices per meal + mildly thick liquids x 3 meals per day and he will accept and consume at least 50% of magic cups with meals without BS > 180 mg/dl

## 2022-02-26 NOTE — PLAN OF CARE
Problem: Skin Integrity:  Goal: Absence of new skin breakdown  Description: Absence of new skin breakdown  Outcome: Ongoing     Problem: Sensory Perception - Impaired:  Goal: Demonstrations of improved sensory functioning will increase  Description: Demonstrations of improved sensory functioning will increase  Outcome: Ongoing

## 2022-02-26 NOTE — PROGRESS NOTES
participate in tx activities. Pain:no c/o pain    Current Diet: ADULT DIET; Dysphagia - Pureed    Diet Tolerance:  Patient tolerating current diet level without signs/symptoms of aspiration. Dysphagia Treatment and Impressions:   Pt seen in room at bedside with RN permission   Chart Review/Interview Per GI note 2/25/2022, pt can advance diet per SLP recommendations.  Respiratory Status: Pt with SPO2% of 99 on RA with RR of 13   Oral care completed. Pt requires assist d/t UE weakness.  NGT present.  Liquid PO Trials:    IDDSI 0 Thin:  Assessed via straw: no anterior bolus loss , suspect functional A-P bolus transit and no clinical s/s of aspiration Pt consumes 4 oz water and cranberry juice during session with 10 sips.  Solid PO Trials   IDDSI 4 Puree:   no anterior bolus loss , suspect functional A-P bolus transit, oral clearance grossly WFL and no clinical s/s of aspiration       Education: SLP edu pt re: Role of SLP, Rationale for dysphagia tx, Recommended compensatory strategies, MBS results, Evidenced based practice for current recommendations and treatment and Importance of oral care to reduce adverse affects in the event of aspiration. Pt verbalized understanding  Assessment: Pt tolerating recommended diet with no clinical s/s of aspiration. Good carryover of recommended compensatory strategies. Pt takes deliberate rest breaks and uses slow rate of intake to pace. Decreased endurance is barrier to meeting nutritional needs solely via po route. Tray had not been ordered as of SLP arrival, however RN placed order for puree/thin liquids prior to SLP session. Encouragement offered to patient re: importance of adequate po intake for removal of NGT. Pt voices good understanding.  Recommendations: SLP recommends to advance to IDDSI 4 Puree Solids; IDDSI 0 Thin Liquids - straws OK;  Meds crushed in puree as able  Risk Management: upright for all intake, stay upright for at least 30 mins after intake, small bites/sips, assist feed, oral care 2-3x/day to reduce adverse affects in the event of aspiration, increase physical mobility as able, slow rate of intake, general aspiration precautions and hold PO and contact SLP if s/s of aspiration or worsening respiratory status develop. Keaton Simons Dysphagia Goals:  Timeframe for Long-term Goals: 7 days (03/03/22)  Goal 1: The patient will tolerate least restrictive diet with no clinical s/s of aspiration or worsening respiratory/pulmonary status  2/26/2022 : Ongoing, progressing     Short-term Goals  Timeframe for Short-term Goals: 5 days (03/01/22)    02/21/22)   Addressed 2/22/2022. MBS completed and supersedes BSE. D/C goal at this time.     Goal 2: The patient/caregiver will demonstrate understanding of compensatory swallow strategies, for improved swallow safety 2/26/2022 Ongoing. Progressing.     Goal 3: The patient will tolerate instrumental assessment when able   2/24/2022 : Goal met      Goal 4: The patient will tolerate recommended diet with no clinical s/s of aspiration 5/5 2/26/2022 Ongoing. Progressing.       Speech/Language/Cog Goals: N/A                        Recommendations:  Solid Consistency: IDDSI 4 Puree Solids  Liquid Consistency: IDDSI 0 Thin Liquids - straws OK  Medication: Meds crushed in puree as able    Patient/Family/Caregiver Education: Role of SLP, Rationale for dysphagia tx, Recommended compensatory strategies, MBS results, Evidenced based practice for current recommendations and treatment and Importance of oral care to reduce adverse affects in the event of aspiration    Compensatory Strategies: Upright for all intake, Remain upright at least 30 mins after intake, Small bites, Small sips and Take pills crushed with applesauce/pudding    Plan:    Continued Dysphagia treatment with goals per plan of care.     Discharge Recommendations: TBD    If pt discharges from hospital prior to Speech/Swallowing discharge, this note serves as tx and discharge summary. Total Treatment Time / Charges     Time in Time out Total Time / units   Cognitive Tx         Speech Tx      Dysphagia Tx 0850 0916 26 min/1 unit     Signature:  Burgess Hooper. Dewayne Garcia M.A.  Enos Klinefelter  Speech-Language Pathologist      Erik Kaye, SLP

## 2022-02-26 NOTE — PROGRESS NOTES
Nephrology Progress Note   [facility]      This patient is a 42year old male whom we are following for RODRICK, HD dependent. Subjective:    Remains in the ICU. Getting blood transfusion   Went to IR for embolization of the gastroduodenal artery, Hb still dropping    Some urine output not has not recovered renal function     ROS: Very weak, no fevers, non-oliguric   Social: No family at bedside. Vitals:  /88   Pulse 98   Temp 98.7 °F (37.1 °C) (Bladder)   Resp 15   Ht 6' 1\" (1.854 m)   Wt 245 lb 6 oz (111.3 kg)   SpO2 99%   BMI 32.37 kg/m²   I/O last 3 completed shifts: In: 1279.5 [Blood:1110.3; IV Piggyback:169.3]  Out: 1725 [Urine:525; Stool:1200]  No intake/output data recorded.     Physical Exam:  Gen: Resting  Cardiovascular:  S1, S2 without m/r/g trace lower extremity edema  Respiratory: Decreased breath sounds  Abdomen:  soft, nt, nd,   Neuro/Psy: Off sedation  Access: R IJ VasCath      Medications:   insulin glargine  10 Units SubCUTAneous BID    ampicillin-sulbactam  3,000 mg IntraVENous 2 times per day    epoetin angeles-epbx  10,000 Units IntraVENous Once per day on Tue Thu Sat    sucralfate  1 g Oral 4x Daily AC & HS    pantoprazole  40 mg IntraVENous BID    dilTIAZem  30 mg Oral 4 times per day    b complex-C-folic acid  1 capsule Oral Daily    carvedilol  12.5 mg Oral BID WC    insulin lispro  0-18 Units SubCUTAneous Q4H    povidone-iodine   Topical Daily    [Held by provider] heparin (porcine)  5,000 Units SubCUTAneous 3 times per day    sodium chloride flush  5-40 mL IntraVENous 2 times per day         Labs:  Recent Labs     02/24/22  0352 02/24/22  1000 02/25/22  0403 02/25/22  0403 02/25/22  1130 02/25/22  2105 02/26/22  0407   WBC 10.1  --  14.5*  --   --   --  11.8*   HGB 6.6*   < > 5.5*   < > 6.0* 7.7* 7.0*   HCT 18.8*   < > 16.8*   < > 17.6* 22.8* 20.7*   MCV 89.4  --  91.6  --   --   --  88.5     --  306  --   --   --  270    < > = values in this interval not displayed. Recent Labs     02/24/22  0352 02/25/22  0403 02/26/22  0407    137 134*   K 4.1 4.8 4.9   CL 99 101 100   CO2 24 25 23   GLUCOSE 196* 198* 175*   PHOS 4.4 4.0 6.2*   BUN 75* 47* 62*   CREATININE 5.7* 3.8* 4.7*   LABGLOM 11* 17* 14*   GFRAA 13* 21* 16*            Assessment/      RODRICK likely related to prerenal factors in the setting of sepsis, use of diuretics and losartan contributing to multifactorial ATN. Sherlene Essex is not suggestive of staph associated glomerulonephritis.  Patient did not respond to IV fluids and developed acute pulmonary edema and renal replacement therapy initiated on 1/23/22        Now had dialysis dependence x 4 weeks, making some urine but limited recovery         On TTS schedule      -Acute pulmonary edema   Resolved with dialysis / now looks euvolemic      -S/P Cardio respiratory arrest    Prolonged hospital stay       -Sepsis with MSSA bacteremia with left foot abscess s/p drainage, osteomyelitis, left hand I&D   Low-grade fever on 2/19     -Anemia - prn prbc's     -Diabetes, uncontrolled     -Hypertension    -Weakness:  Prolonged ICU stay following MSSA bacteremia complicated by cardiac arrest.  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 Went to IR for embolization of the gastroduodenal artery        Plan/     -HD on TTS schedule   Use Nepro for tube feeds   Monitor signs of renal recovery   Needs TDC at some point, given low grade fevers I will discuss with ID as to the timing   -Retacrit to 10,000 units qHD.   Blood transfusion   -renal dose medications   -avoid nephrotoxins

## 2022-02-26 NOTE — PROGRESS NOTES
Hand off report give to Guero Diaz RN. Patient is currently infusing PRBC at this time due to hgb of 7. On room air, stable showing no signs of distress and has no current needs at this time. Call light is in reach, bed is in lowest position and care is transferred at this time.

## 2022-02-26 NOTE — PROGRESS NOTES
Shift assessment completed see flow sheet. Patient in bed responding to voice. Patient on room air, showing no signs of distress. Denies any pain. Morning medications given per order. No other needs at this time. Call light within reach, bed is in lowest position and wheels are locked.

## 2022-02-26 NOTE — PROGRESS NOTES
Dressing change completed on left hand. Prn percocet provided ahead of time to help with pain management. Patient tolerated well, showed No signs of distress. Patient currently In bed resting. Bed in lowest position, Call light in reach. Will continue to monitor.

## 2022-02-26 NOTE — PROGRESS NOTES
Prn oxycodone given per mar for c/o pain in wound on bottom. Patient repositioned on right side with pillow support.
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
## 2022-02-26 NOTE — PROGRESS NOTES
GI Progress Note      SUBJECTIVE:  Hematochezia continues. He required another unit of PRBCs this am.  This is 9th unit of blood.     OBJECTIVE      Medications    Current Facility-Administered Medications: 0.9 % sodium chloride infusion, , IntraVENous, PRN  insulin glargine (LANTUS) injection vial 10 Units, 10 Units, SubCUTAneous, BID  0.9 % sodium chloride infusion, , IntraVENous, PRN  ondansetron (ZOFRAN) injection 4 mg, 4 mg, IntraVENous, Q6H PRN  ampicillin-sulbactam (UNASYN) 3000 mg ivpb minibag, 3,000 mg, IntraVENous, 2 times per day  oxyCODONE-acetaminophen (PERCOCET) 5-325 MG per tablet 1 tablet, 1 tablet, Oral, Q4H PRN  epoetin angeles-epbx (RETACRIT) injection 10,000 Units, 10,000 Units, IntraVENous, Once per day on Tue Thu Sat  sucralfate (CARAFATE) tablet 1 g, 1 g, Oral, 4x Daily AC & HS  pantoprazole (PROTONIX) injection 40 mg, 40 mg, IntraVENous, BID  dilTIAZem (CARDIZEM) tablet 30 mg, 30 mg, Oral, 4 times per day  b complex-C-folic acid (NEPHROCAPS) capsule 1 mg, 1 capsule, Oral, Daily  carvedilol (COREG) tablet 12.5 mg, 12.5 mg, Oral, BID WC  insulin lispro (HUMALOG) injection vial 0-18 Units, 0-18 Units, SubCUTAneous, Q4H  povidone-iodine (BETADINE) 10 % external solution, , Topical, Daily  [Held by provider] heparin (porcine) injection 5,000 Units, 5,000 Units, SubCUTAneous, 3 times per day  heparin (porcine) injection 3,000 Units, 3,000 Units, IntraVENous, PRN  sodium chloride 0.9 % irrigation 1,000 mL, 1,000 mL, Irrigation, Continuous PRN  albumin human 25 % IV solution 12.5 g, 12.5 g, IntraVENous, PRN  heparin (porcine) injection 2,600 Units, 2,600 Units, IntraCATHeter, PRN  sodium chloride flush 0.9 % injection 5-40 mL, 5-40 mL, IntraVENous, 2 times per day  sodium chloride flush 0.9 % injection 5-40 mL, 5-40 mL, IntraVENous, PRN  0.9 % sodium chloride infusion, 25 mL, IntraVENous, PRN  acetaminophen (TYLENOL) tablet 650 mg, 650 mg, Oral, Q6H PRN **OR** acetaminophen (TYLENOL) suppository 650 mg, 650 mg, Rectal, Q6H PRN  glucose (GLUTOSE) 40 % oral gel 15 g, 15 g, Oral, PRN  glucagon (rDNA) injection 1 mg, 1 mg, IntraMUSCular, PRN  dextrose 5 % solution, 100 mL/hr, IntraVENous, PRN  dextrose bolus (hypoglycemia) 10% 125 mL, 125 mL, IntraVENous, PRN **OR** dextrose bolus (hypoglycemia) 10% 250 mL, 250 mL, IntraVENous, PRN  Physical    VITALS:  BP (!) 118/90   Pulse 101   Temp 98.8 °F (37.1 °C) (Bladder)   Resp 17   Ht 6' 1\" (1.854 m)   Wt 245 lb 6 oz (111.3 kg)   SpO2 100%   BMI 32.37 kg/m²   ABD: soft, NT, ND, NABs  Data    Recent Labs     02/24/22  0352 02/24/22  1000 02/25/22  0403 02/25/22  0403 02/25/22  1130 02/25/22  2105 02/26/22  0407   WBC 10.1  --  14.5*  --   --   --  11.8*   HGB 6.6*   < > 5.5*   < > 6.0* 7.7* 7.0*   HCT 18.8*   < > 16.8*   < > 17.6* 22.8* 20.7*   MCV 89.4  --  91.6  --   --   --  88.5     --  306  --   --   --  270    < > = values in this interval not displayed. ASSESSMENT AND PLAN  42year old male with a history of HTN, DM, A fib, and nonischemic cardiomyopathy admitted with septic shock secondary to MSSA bacteremia and L foot abscess complicated by acute respiratory failure and RODRICK. Hospitalized c/b cardiorespiratory arrest, RODRICK requiring HD and GI bleed secondary to large DU s/p IR guided coil embolization of GDA yesterday. Despite this intervention and ongoing use of PPI gtts bleeding persists. I had a discussion with Dr Tia Clarke from IR who noted that examination of the vasculature yesterday was limited due to the presence of contrast from the previous swallow study. A CTA has been advised. A KUB will be repeated to assess the presence of barium. A CTA may follow. I have asked RN staff to contact nephrology regarding the possibility of a CTA later today.    A hgb will be repeated immediately.

## 2022-02-26 NOTE — PROGRESS NOTES
Rounding completed with Dr. Ge Shepherd and multidisciplinary team. POC, labs, lines and assessment reviewed.    - Start pureed diet  - Keep in ICU today

## 2022-02-26 NOTE — PROGRESS NOTES
Hgb @ 7.0 1 unit of PRBC given. Patient shows no signs of distress or adverse reactions. Vitals are stable, patient is in bed resting at this time. Will continue to monitor patient during transfusion.

## 2022-02-26 NOTE — FLOWSHEET NOTE
02/25/22 2012   Vital Signs   Temp 99.2 °F (37.3 °C)   Temp Source Bladder   Pulse 100   Heart Rate Source Monitor   Resp 14   /77   BP Location Left upper arm   MAP (mmHg) 91   Patient Position Semi fowlers   Level of Consciousness Responds to Voice (1)   MEWS Score 0   Patient Currently in Pain Denies   CPOT (Critical Patient)   O2 Device None (Room air)   Oxygen Therapy   SpO2 99 %   Pulse Oximeter Device Mode Continuous   Pulse Oximeter Device Location Finger   Height and Weight   Weight 245 lb 6 oz (111.3 kg)   Weight Method Actual;Bed scale   BMI (Calculated) 32.4     Shift assessment completed see flow sheet. Patient in bed responding to voice. Patient on room air, showing no signs of distress. Denies any pain. Currently running a blood transfusion tolerating well with no signs of adverse reaction. Evening medications given per order. No other needs at this time. Call light in reach.
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
## 2022-02-27 NOTE — ANESTHESIA POSTPROCEDURE EVALUATION
Department of Anesthesiology  Postprocedure Note    Patient: Edmar Lira  MRN: [id number]  YOB: 1975  Date of evaluation: 2/27/2022  Time:  3:41 PM     Procedure Summary     Date: 02/27/22 Room / Location: [address]    Anesthesia Start: 5400 Anesthesia Stop: 5509    Procedure: PYLOROPLASTY, ULCER OVER-SEW, JEJUNOSTOMY TUBE INSERTION (N/A ) Diagnosis: (ABDOMINAL PAIN)    Surgeons: Amber Resendiz MD Responsible Provider: Jenny Fiore MD    Anesthesia Type: general ASA Status: 4 - Emergent          Anesthesia Type: general    Deysi Phase I: Deysi Score: 10    Deysi Phase II: Deysi Score: 10    Last vitals: Reviewed and per EMR flowsheets. Anesthesia Post Evaluation    Patient location during evaluation: ICU  Patient participation: complete - patient participated  Level of consciousness: awake and alert  Airway patency: patent  Nausea & Vomiting: no nausea and no vomiting  Complications: no  Cardiovascular status: blood pressure returned to baseline  Respiratory status: acceptable  Hydration status: euvolemic  Comments: VSS on transfer to phase 2 recovery. No anesthetic complications.

## 2022-02-27 NOTE — PROGRESS NOTES
Nephrology Progress Note   [facility]      This patient is a 42year old male whom we are following for RODRICK, HD dependent. Subjective:    Remains in the ICU. Still with GI bleed   Some urine output not has not recovered renal function   Not able to get dialysis yesterday due to patient request and angiogram, planning for EGD today     ROS: Very weak, no fevers, non-oliguric   Social: No family at bedside. Vitals:  /76   Pulse 100   Temp 98.7 °F (37.1 °C) (Bladder)   Resp 13   Ht 6' 1\" (1.854 m)   Wt 233 lb 11 oz (106 kg)   SpO2 100%   BMI 30.83 kg/m²   I/O last 3 completed shifts: In: 2009.8 [P.O.:460; I.V.:64; Blood:1025; NG/GT:60; IV Piggyback:400.8]  Out: 2020 [Urine:770; Stool:1250]  No intake/output data recorded.     Physical Exam:  Gen: Alert, still very weak   Cardiovascular:  S1, S2 without m/r/g trace lower extremity edema  Respiratory: Decreased breath sounds  Abdomen:  soft, nt, nd,   Neuro/Psy: Off sedation  Access: R IJ VasCath      Medications:   furosemide  100 mg IntraVENous Once    insulin glargine  10 Units SubCUTAneous BID    ampicillin-sulbactam  3,000 mg IntraVENous 2 times per day    epoetin angeles-epbx  10,000 Units IntraVENous Once per day on Tue Thu Sat    sucralfate  1 g Oral 4x Daily AC & HS    dilTIAZem  30 mg Oral 4 times per day    b complex-C-folic acid  1 capsule Oral Daily    carvedilol  12.5 mg Oral BID WC    insulin lispro  0-18 Units SubCUTAneous Q4H    povidone-iodine   Topical Daily    [Held by provider] heparin (porcine)  5,000 Units SubCUTAneous 3 times per day    sodium chloride flush  5-40 mL IntraVENous 2 times per day         Labs:  Recent Labs     02/25/22  0403 02/25/22  1130 02/26/22  0407 02/26/22  1255 02/26/22 2010 02/27/22  0215 02/27/22  0743   WBC 14.5*  --  11.8*  --   --  13.9*  --    HGB 5.5*   < > 7.0*   < > 7.0* 7.6* 7.1*   HCT 16.8*   < > 20.7*   < > 20.5* 22.2* 21.2*   MCV 91.6  --  88.5  --   --  87.7  --     --  270  --   --  209  --     < > = values in this interval not displayed. Recent Labs     02/25/22  0403 02/26/22  0407 02/27/22  0215    134* 129*   K 4.8 4.9 5.4*    100 97*   CO2 25 23 20*   GLUCOSE 198* 175* 138*   PHOS 4.0 6.2* 6.9*   BUN 47* 62* 64*   CREATININE 3.8* 4.7* 5.4*   LABGLOM 17* 14* 12*   GFRAA 21* 16* 14*            Assessment/      RODRICK likely related to prerenal factors in the setting of sepsis, use of diuretics and losartan contributing to multifactorial ATN. Tharon Dover is not suggestive of staph associated glomerulonephritis.  Patient did not respond to IV fluids and developed acute pulmonary edema and renal replacement therapy initiated on 1/23/22        Now had dialysis dependence x 4 weeks, making some urine but limited recovery         On TTS schedule but missed on Saturday due to other medical issues       -Acute pulmonary edema   Resolved with dialysis / now looks euvolemic      -S/P Cardio respiratory arrest    Prolonged hospital stay       -Sepsis with MSSA bacteremia with left foot abscess s/p drainage, osteomyelitis, left hand I&D   Low-grade fever on 2/19     -Anemia - prn prbc's     -Diabetes, uncontrolled     -Hypertension    -Weakness:  Prolonged ICU stay following MSSA bacteremia complicated by cardiac arrest.  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 Went to IR for embolization of the gastroduodenal artery. CT angiogram with no active bleeding.   S/p multiple units of blood transfusion         Plan/     HD tomorrow  Will give 100 mg IV lasix x 1 since he is making urine  NPO for EGD so will hold on lokelma

## 2022-02-27 NOTE — PROGRESS NOTES
Shift assessment completed see flow sheet. Patient in bed responding to voice. Patient on room air, showing no signs of distress. Denies any pain. Morning medications held, Pt NPO for EGD procedure this morning. No other needs at this time. Call light within reach, bed is in lowest position and wheels are locked.
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
## 2022-02-27 NOTE — PROGRESS NOTES
Pt a/o. VSS. Assessment complete as charted. Repositioned for comfort. Call light in reach. Denies needs. Will continue to monitor. Pt has Right neck Vas cath and right PICC dressing dry and intact. Munoz with stat lock and flexi seal in place. Pt has moderate amount of blood clots around his flexi seal,  aware, Protonix drip infusing at 8 mg/hr. H/H 7/20.5, one unit of blood ordered by Wellington Avila, awaiting for blood bank to make the unit ready. Plan for EGD in the morning, NPO after MN. Pt's wife updated.
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
## 2022-02-27 NOTE — PROGRESS NOTES
Spoke with Dr. Shakeel Argueta via telephone. He told me to d/c the PCA pump and order 1 mg dilauded q 2. Patient kept NPO for time being.

## 2022-02-27 NOTE — OP NOTE
Esophagogastroduodenoscopy Note    Patient:   Darwin Schneider    YOB: 1975    Facility:   [facility] [Inpatient]   Referring/PCP: Yancy Rivera MD    Procedure:   Esophagogastroduodenoscopy --diagnostic  Date:     2/27/2022   Endoscopist:  Sulma Ramirez MD     Preoperative Diagnosis:   42year old male with a history of HTN, DM, A fib, and nonischemic cardiomyopathy admitted with septic shock secondary to MSSA bacteremia and L foot abscess complicated by acute respiratory failure and RODRICK. Hospitalized c/b cardiorespiratory arrest, RODRICK requiring HD and GI bleed secondary to large DU s/p IR guided coil embolization of GDA. Persistent bleeding requiring several PRBC transfusions noted. Postoperative Diagnosis:  Active duodenal bleeding    Anesthesia:  Versed 3 mg IV, fentanyl 25 mcg IV    Estimated blood loss: Estimated amount of blood loss is 100ml. Complications: None    Description of Procedure:  Informed consent was obtained from the patient's wife after explanation of the procedure including indications, description of the procedure,  benefits and possible risks and complications of the procedure, and alternatives. Questions were answered. The patient's history was reviewed and a directed physical examination was performed prior to the procedure. Patient was monitored throughout the procedure with pulse oximetry and periodic assessment of vital signs. Patient was sedated as noted above. The Nursing staff and I performed a time out. With the patient in the left lateral decubitus position, the Olympus videoendoscope was placed in the patient's mouth and under direct visualization passed into the esophagus. The scope was ultimately passed to the second portion of the duodenum. Visualization was performed during both introduction and withdrawal of the endoscope and retroflexed view of the proximal stomach was obtained.      Findings[de-identified] Esophagus: limited view with the presence of a NGT. The findings do not support a diagnosis of Barbour's Esophagus. Stomach: limited views due to the presence of dark liquid  Duodenum: bright red blood noted in bulb and 2nd segment.   Identification of bleeding source could not be seen despite rinses and suctioning    Recommendations:  - check KUB to ascertain NGT position  - I have spoken with general surgery who will see the patient shortly  - continue IV PPI gtts  - follow Hgb closely and transfuse as indicated    Kenia Oh MD, MD

## 2022-02-27 NOTE — H&P
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    Chronic osteomyelitis of left foot ([address] 10/27/2020    Closed displaced fracture of distal phalanx of right little finger 2021    Clostridium difficile infection 2016    Diabetic ulcer of left forefoot associated with type 2 diabetes mellitus, with necrosis of bone ([address] 2019    Diabetic ulcer of right great toe associated with type 2 diabetes mellitus, with necrosis of bone ([address] 2019    Failed soft 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 toe amputation site 10/26/2020    History of hyperbaric oxygen therapy 10/28/2020    [facility]- [facility] 3 - compromised flap    Infective tenosynovitis of extensor tendons of left hand 2/3/2022    Migraine     Possible perforated tympanic membrane     as a result of recurrent otitis    Post-op hematoma of left foot 2020    Recurrent otitis media     Septic shock (HCC)     Syncope     Tear of medial meniscus of left knee     Tobacco use     Toe osteomyelitis, left ([address] 2020    VAP (ventilator-associated pneumonia) ([address]      No current facility-administered medications on file prior to encounter.      Current Outpatient Medications on File Prior to Encounter   Medication Sig Dispense Refill    Insulin Degludec (TRESIBA FLEXTOUCH) 200 UNIT/ML SOPN INJECT 76 UNITS INTO THE SKIN NIGHTLY 9 mL 0    losartan (COZAAR) 50 MG tablet TAKE 1 TABLET BY MOUTH DAILY 90 tablet 1    tiZANidine (ZANAFLEX) 4 MG tablet TAKE 2 TABLETS BY MOUTH NIGHTLY 180 tablet 0    carvedilol (COREG) 12.5 MG tablet TAKE 1 TABLET BY MOUTH 2 TIMES DAILY (WITH MEALS) 180 tablet 0    rosuvastatin (CRESTOR) 20 MG tablet TAKE 1 TABLET BY MOUTH NIGHTLY 90 tablet 0    traZODone (DESYREL) 50 MG tablet TAKE 1 TABLET BY MOUTH NIGHTLY 90 tablet 0    furosemide (LASIX) 20 MG tablet TAKE 1 TABLET BY MOUTH DAILY 90 tablet 0    gabapentin (NEURONTIN) 400 MG capsule TAKE 2 CAPSULES BY MOUTH 3 TIMES DAILY FOR 90 DAYS. 540 capsule 0    insulin lispro (HUMALOG KWIKPEN) 200 UNIT/ML SOPN pen Inject 25 Units into the skin 3 times daily (before meals) 5 pen 2    glimepiride (AMARYL) 4 MG tablet TAKE 1 TABLET BY MOUTH EVERY MORNING (BEFORE BREAKFAST). 90 tablet 0    PARoxetine (PAXIL) 10 MG tablet TAKE 1 TABLET BY MOUTH EVERY MORNING 90 tablet 0    blood glucose test strips (ONETOUCH ULTRA) strip USE TO TEST BLOOD GLUCOSE DAILY. DX:E11.9 100 strip 0    Blood Glucose Monitoring Suppl (CONTOUR NEXT EZ) w/Device KIT Use to test glucose daily. DX:E11.9 1 kit 0    Insulin Pen Needle 32G X 6 MM MISC Use with insulin daily . DX:E11.9 100 each 3    blood glucose monitor strips Use to test blood sugar daily. DX:E11.9 100 strip 3    oxymetazoline (AFRIN) 0.05 % nasal spray 2 sprays by Nasal route daily Indications: prior to HBO      loratadine (CLARITIN) 10 MG tablet Take 10 mg by mouth nightly as needed       sildenafil (VIAGRA) 100 MG tablet TAKE 1 TABLET BY MOUTH AS NEEDED FOR ERECTILE DYSFUNCTION 15 tablet 0     Nurses notes reviewed and agreed. Medications reviewed  Allergies: Allergies   Allergen Reactions    Januvia [Sitagliptin] Nausea Only     Has taken metformin without side effects in the past.  Nausea with Janumet in the past.     Metformin And Related      GI Upset    Vancomycin      Pt had red face, swelling itching of eyelids, sore throat after receiving vancmomyin and cefepime. I think this was a histamine releasing reaction from vancomycin most likely.  The cefepime was switched to Zosyn and patient had no reaction to Zosyn    Mustard Milas Dion Isothiocyanate] Swelling and Rash           Physical Exam:  Vital Signs: /85   Pulse 102   Temp 98.7 °F (37.1 °C) (Bladder)   Resp 13   Ht 6' 1\" (1.854 m)   Wt 233 lb 11 oz (106 kg)   SpO2 100%   BMI 30.83 kg/m²  Body mass index is 30.83 kg/m². Airway:Normal  Cardiac:Normal  Pulmonary:Normal  Abdomen:Normal  Specific to procedure: Mallampati 3      Pre-Procedure Assessment/Plan:  ASA 4 - Patient with severe systemic disease that is a constant threat to life    Level of Sedation Plan: Moderate sedation    Post Procedure plan: Return to same level of care    I assessed the patient and find that the patient is in satisfactory condition to proceed with the planned procedure and sedation plan. I have explained the risk, benefits, and alternatives to the procedure. The patient understands and agrees to proceed.   Yes    Anibal Warner MD  10:03 AM 2/27/2022

## 2022-02-27 NOTE — OP NOTE
Date of Surgery: 2/27/22    Preop Dx:  Bleeding Duodenal Ulcer    Postop Dx:  Same    Procedure:  Exploratory Laparotomy, Oversew of Duodenal Ulcer, Pyloroplasty, Feeding Jejunostomy    Surgeon:  Thang Herrera    Assistant:      Anesthesia:  GETA    EBL:   <50ml    Specimen:  none    Complications: none    Drains/Lines:  10F flat drain    Indications:  43 yo with bleeding duodenal ulcer with continued bleeding despite GDA embolization and EGDs as well as >10 units of PRBC transfusion requirement. Description:  Patient and family were given adequate description of the risks and rewards of the procedure, including bleeding, infection, injury to surrounding structures, need for further surgery, stenosis and freely consented. He was given appropriate antibiotics and brought to the OR where GETA anesthesia was induced. He was placed in supine position. Prepped and draped in usual sterile fashion. Incision was made in the upper midline with #10blade. This was carried down to the fascial level with electrocautery. Opposing edges of the fascia were grasped with forceps and it was incised with the knife in between, without injury to underlying structures. The rest of the fascia was then opened with electrocautery. There were some omental adhesions to the area that were taken down to exposed the pylorus. The ng was in good position. Stay sutures of 2-0 silk were placed along the inferior and superior border of the pylorus. The pylorus was then opened starting about 2cm on the stomach side and carried 2-3 cm onto the duodenal side. There was a lot of clotted blood removed from the duodenum. There was a large friable ulcer with oozing of blood coming from its base. Nothing pulsatile was seen but the oozing continued after full exposure. No other source of bleeding seen. The ulcer was then oversewn with 3-0 silk sutures x3. The area was irrigated and no significant bleeding seen at the end.   2-0 PDS continuous suture was used to close the stomach and duodenum in a transverse fashion so as to not cause stenosis. An outer layer of 3-0 silk interrupted sutures was also placed. Using ligasure an omental pedicle flap was created and sewn over top of the pyloroplasty with 3-0 silk sutures. The area was saline lavaged. I was unable to adequately expose his hiatus to do a vagotomy. A 10F flat drain was placed through a stab incision in the right upper abdomen and positioned in the area. It was secured to the skin with silk suture. Next, the decision was made to create a jejunostomy tube. 30 cm distal to the ligament of Treitz a suitable position for the jejunostomy tube was chosen. The patient's left side was chosen for placement of the feeding tube and a stab incision made over the middle of the rectus muscle just above the level of the umbilicus. Through this an 14F red rubber catheter was passed using a tonsil clamp. The tip was cut off along with several side holes. A purse string of 3-0 silk was constructed at 1.5cm diameter on the jejunum. Between this an enterotomy was created with electrocautery and through this the red rubber catheter passed distally for 20cm. The purse string was tied securely. The jejunostomy was then completed in the Witzel technique using 3-0 silk sutures. The feeding tube was connected to a rea bag for drainage. The abdomen was then lavaged with warm normal saline. The fascia was then closed with running looped 0-0 PDS sutures x 2. Staples were used to close the epidermis. Sterile dressing placed. All suture, sponge and instrument count correct times two at end of case. Transferred to ICU in stable condition.     Rashel Nunez

## 2022-02-27 NOTE — ANESTHESIA PRE PROCEDURE
Department of Anesthesiology  Preprocedure Note       Name:  Autumn Jay   Age:  42 y.o.  :  1975                                          MRN:  [id number]         Date:  2022      Surgeon: Olesya Garcia):  Jose Wharton MD    Procedure: Procedure(s):  PYLOROPLASTY    Medications prior to admission:   Prior to Admission medications    Medication Sig Start Date End Date Taking? Authorizing Provider   Insulin Degludec (TRESIBA FLEXTOUCH) 200 UNIT/ML SOPN INJECT 76 UNITS INTO THE SKIN NIGHTLY 22  Yes Ana Seth MD   losartan (COZAAR) 50 MG tablet TAKE 1 TABLET BY MOUTH DAILY 22  Yes Ana Seth MD   tiZANidine (ZANAFLEX) 4 MG tablet TAKE 2 TABLETS BY MOUTH NIGHTLY 22  Yes Ana Seth MD   carvedilol (COREG) 12.5 MG tablet TAKE 1 TABLET BY MOUTH 2 TIMES DAILY (WITH MEALS) 21  Yes Ana Seth MD   rosuvastatin (CRESTOR) 20 MG tablet TAKE 1 TABLET BY MOUTH NIGHTLY 21  Yes Rosette Hernandez MD   traZODone (DESYREL) 50 MG tablet TAKE 1 TABLET BY MOUTH NIGHTLY 21  Yes Ana Seth MD   furosemide (LASIX) 20 MG tablet TAKE 1 TABLET BY MOUTH DAILY 21  Yes Ana Seth MD   gabapentin (NEURONTIN) 400 MG capsule TAKE 2 CAPSULES BY MOUTH 3 TIMES DAILY FOR 90 DAYS. 21 Yes Ana Seth MD   insulin lispro (HUMALOG KWIKPEN) 200 UNIT/ML SOPN pen Inject 25 Units into the skin 3 times daily (before meals) 10/14/21  Yes Ana Seth MD   glimepiride (AMARYL) 4 MG tablet TAKE 1 TABLET BY MOUTH EVERY MORNING (BEFORE BREAKFAST). 9/10/21  Yes Ana Seth MD   PARoxetine (PAXIL) 10 MG tablet TAKE 1 TABLET BY MOUTH EVERY MORNING 9/10/21  Yes Ana Seth MD   blood glucose test strips (ONETOUCH ULTRA) strip USE TO TEST BLOOD GLUCOSE DAILY. DX:E11.9 3/5/21  Yes Ana Seth MD   Blood Glucose Monitoring Suppl (CONTOUR NEXT EZ) w/Device KIT Use to test glucose daily. DX: E11.9 12/10/20  Yes Neda Garcia MD   Insulin Pen Needle 32G X 6 MM MISC Use with insulin daily . DX:E11.9 12/4/20  Yes Neda Garcia MD   blood glucose monitor strips Use to test blood sugar daily.   DX:E11.9 12/4/20  Yes Neda Garcia MD   oxymetazoline (AFRIN) 0.05 % nasal spray 2 sprays by Nasal route daily Indications: prior to HBO   Yes Historical Provider, MD   loratadine (CLARITIN) 10 MG tablet Take 10 mg by mouth nightly as needed    Yes Historical Provider, MD   sildenafil (VIAGRA) 100 MG tablet TAKE 1 TABLET BY MOUTH AS NEEDED FOR ERECTILE DYSFUNCTION 8/27/19  Yes Neda Garcia MD       Current medications:    Current Facility-Administered Medications   Medication Dose Route Frequency Provider Last Rate Last Admin    0.9 % sodium chloride infusion   IntraVENous PRN Su Vergara MD        insulin glargine (LANTUS) injection vial 10 Units  10 Units SubCUTAneous BID Servando Jacobson MD   10 Units at 02/27/22 0904    pantoprazole (PROTONIX) 80 mg in sodium chloride 0.9 % 100 mL infusion  8 mg/hr IntraVENous Continuous Samanta Nolan MD 10 mL/hr at 02/27/22 0550 8 mg/hr at 02/27/22 0550    0.9 % sodium chloride infusion   IntraVENous PRN Samanta Nolan MD        ondansetron [facility]) injection 4 mg  4 mg IntraVENous Q6H PRN Ifrah Skelton MD   4 mg at 02/27/22 0752    ampicillin-sulbactam (UNASYN) 3000 mg ivpb minibag  3,000 mg IntraVENous 2 times per day Ifrah Skelton MD   Stopped at 02/27/22 0633    oxyCODONE-acetaminophen (PERCOCET) 5-325 MG per tablet 1 tablet  1 tablet Oral Q4H PRN Ifrah Skelton MD   1 tablet at 02/27/22 0048    epoetin [place]-epbx (RETACRIT) injection 10,000 Units  10,000 Units IntraVENous Once per day on Tue Thu Sat Ifrah Skelton MD   10,000 Units at 02/24/22 1818    sucralfate (CARAFATE) tablet 1 g  1 g Oral 4x Daily AC & HS Ifrah Skelton MD   1 g at 02/27/22 0532    dilTIAZem (CARDIZEM) tablet 30 mg  30 mg Oral 4 times per day Naye Bradshaw MD   30 mg at 02/27/22 0528    b complex-C-folic acid (NEPHROCAPS) capsule 1 mg  1 capsule Oral Daily Naye Bradshaw MD   1 mg at 02/22/22 0813    carvedilol (COREG) tablet 12.5 mg  12.5 mg Oral BID WC Naye Bradshaw MD   12.5 mg at 02/26/22 1710    insulin lispro (HUMALOG) injection vial 0-18 Units  0-18 Units SubCUTAneous Q4H Emerita Tate MD   3 Units at 02/26/22 2017    povidone-iodine (BETADINE) 10 % external solution   Topical Daily Emerita Tate MD   Given at 02/27/22 0753    [Held by provider] heparin (porcine) injection 5,000 Units  5,000 Units SubCUTAneous 3 times per day Emerita Tate MD   5,000 Units at 02/16/22 0555    heparin (porcine) injection 3,000 Units  3,000 Units IntraVENous PRN Emerita Tate MD   3,000 Units at 02/04/22 1107    sodium chloride 0.9 % irrigation 1,000 mL  1,000 mL Irrigation Continuous PRN Emerita Tate MD        albumin human 25 % IV solution 12.5 g  12.5 g IntraVENous PRN Emerita Tate MD   Stopped at 02/17/22 1415    heparin (porcine) injection 2,600 Units  2,600 Units IntraCATHeter PRN Emerita Tate MD   2,600 Units at 02/22/22 1208    sodium chloride flush 0.9 % injection 5-40 mL  5-40 mL IntraVENous 2 times per day Emerita Tate MD   10 mL at 02/27/22 0752    sodium chloride flush 0.9 % injection 5-40 mL  5-40 mL IntraVENous PRN Emerita Tate MD        0.9 % sodium chloride infusion  25 mL IntraVENous PRN Emerita Tate  mL/hr at 02/27/22 0548 25 mL at 02/27/22 0548    acetaminophen (TYLENOL) tablet 650 mg  650 mg Oral Q6H PRN Emerita Tate MD   650 mg at 02/19/22 2327    Or    acetaminophen (TYLENOL) suppository 650 mg  650 mg Rectal Q6H PRN Emerita Tate MD        glucose (GLUTOSE) 40 % oral gel 15 g  15 g Oral PRN Emerita Tate MD        glucagon (rDNA) injection 1 mg  1 mg IntraMUSCular PRN Kimmie Hernandez MD        dextrose 5 % solution  100 mL/hr IntraVENous PRN Kimmie Hernandez MD        dextrose bolus (hypoglycemia) 10% 125 mL  125 mL IntraVENous PRN Kimmie Hernandez MD        Or    dextrose bolus (hypoglycemia) 10% 250 mL  250 mL IntraVENous PRN Kimmie Hernandez MD           Allergies: Allergies   Allergen Reactions    Januvia [Sitagliptin] Nausea Only     Has taken metformin without side effects in the past.  Nausea with Janumet in the past.     Metformin And Related      GI Upset    Vancomycin      Pt had red face, swelling itching of eyelids, sore throat after receiving vancmomyin and cefepime. I think this was a histamine releasing reaction from vancomycin most likely.  The cefepime was switched to Zosyn and patient had no reaction to Zosyn    Mustard [employer] Isothiocyanate] Swelling and Rash       Problem List:    Patient Active Problem List   Diagnosis Code    Hypertension I10    Uncontrolled type 2 diabetes mellitus with diabetic polyneuropathy ([facility]) E11.42, E11.65    Cardiomyopathy ([facility] I42.9    Mixed hyperlipidemia E78.2    Allergic rhinitis J30.9    Osteoarthritis M19.90    Acute osteomyelitis of left foot ([facility]) M86.172    RODRICK (acute kidney injury) ([address] N17.9    MSSA bacteremia R78.81, B95.61    Third degree burn of left hand T23.302A    Cardiopulmonary arrest ([facility]) I46.9    Hypertriglyceridemia E78.1    Staphylococcal arthritis of left foot ([facility]) M00.072    Antibiotic-associated diarrhea K52.1, T36.95XA    Enterococcal infection A49.1    Acute osteomyelitis of left hand ([facility]) M86.142    Pressure injury of deep tissue of sacral region L89.156    Severe protein-calorie malnutrition ([facility]) E43    Paroxysmal atrial fibrillation ([facility]) I48.0    Upper GI bleeding K92.2    Duodenal ulcer K26.9    Diabetic ulcer of left midfoot associated with type 2 diabetes mellitus, with necrosis of bone ([address] E11.621, L97.424    Probable abscess of upper lobe of right lung without pneumonia (HCC) J85.2    Gastrointestinal hemorrhage K92.2    Abnormal CXR R93.89       Past Medical History:        Diagnosis Date    Abscess of left foot 1/24/2022    Abscess of left hand     Acute encephalopathy     Acute hypoxemic respiratory failure (HCC)     Acute osteomyelitis of right hallux ([address] 08/2019    Cellulitis of left foot 7/26/2020    CHF (congestive heart failure) ([address] 09/2014    presumably from viral myocarditis    Chronic osteomyelitis of left foot ([address] 10/27/2020    Closed displaced fracture of distal phalanx of right little finger 4/8/2021    Clostridium difficile infection 11/01/2016    Diabetic ulcer of left forefoot associated with type 2 diabetes mellitus, with necrosis of bone ([address] 8/1/2019    Diabetic ulcer of right great toe associated with type 2 diabetes mellitus, with necrosis of bone ([address] 08/2019    Failed soft 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 toe amputation site 10/26/2020    History of hyperbaric oxygen therapy 10/28/2020    [facility]- [facility] - compromised flap    Infective tenosynovitis of extensor tendons of left hand 2/3/2022    Migraine     Possible perforated tympanic membrane     as a result of recurrent otitis    Post-op hematoma of left foot 11/12/2020    Recurrent otitis media     Septic shock (HCC)     Syncope     Tear of medial meniscus of left knee     Tobacco use     Toe osteomyelitis, left ([address] 7/24/2020    VAP (ventilator-associated pneumonia) [facility])        Past Surgical History:        Procedure Laterality Date    ARM SURGERY Left 02/05/2022    LEFT HAND INCISION AND DRAINAGE performed by Chely Saavedra MD at [address] Left 02/01/2022    ULCER DEBRIDEMENT LEFT FOOT performed by Raine Feldman DPM at [address] IR EMBOLIZATION HEMORRHAGE Right 02/25/2022    successful coil embolization of the gastroduodenal artery    IR NONTUNNELED VASCULAR CATHETER 2022    IR NONTUNNELED VASCULAR CATHETER 2022 [facility]ED[facility]    KNEE ARTHROSCOPY Left 08/15/2013    LEFT KNEE ARTHROSCOPY , SYNOVECTOMY       OTHER SURGICAL HISTORY Left 2020    PARTIAL LEFT FOOT AMPUTATION    TOE AMPUTATION Right 2019    PARTIAL RIGHT FOOT AMPUTATION performed by Jacob Mckeon DPM at [address] TOE AMPUTATION Left 2020    PARTIAL LEFT FOOT AMPUTATION performed by Jacob Mckeon DPM at [address] TOE AMPUTATION Left 10/22/2020    PARTIAL LEFT FOOT AMPUTATION WITH GRAFT APPLICATION performed by Jacob Mckeon DPM at [address] [facility] ENDOSCOPY N/A 2022    EGD W/ANES.  performed by Susie Schroeder MD at [address] [facility] EXTRACTION         Social History:    Social History     Tobacco Use    Smoking status: Former Smoker     Packs/day: 0.50     Years: 2.00     Pack years: 1.00     Quit date: 2005     Years since quittin.5    Smokeless tobacco: Former User     Quit date: 2005    Tobacco comment: SMOKED OCCASIONALLY UNTIL 8 YEARS AGO   Substance Use Topics    Alcohol use: Yes     Comment: RARELY                                Counseling given: Not Answered  Comment: SMOKED OCCASIONALLY UNTIL 8 YEARS AGO      Vital Signs (Current):   Vitals:    22 1006 22 1010 22 1020 22 1112   BP: 117/82 114/83 114/82 109/84   Pulse: 107 118 112 111   Resp: 20 18   Temp:    99.2 °F (37.3 °C)   TempSrc:       SpO2: 100% 100% 98%    Weight:       Height:                                                  BP Readings from Last 3 Encounters:   22 109/84   22 (!) 148/95   22 116/62       NPO Status: Time of last liquid consumption: 0000                        Time of last solid consumption: 0000                        Date of last liquid consumption: 22                        Date of last solid food consumption: 22    BMI:   Wt Readings from Last 3 Encounters:   02/27/22 233 lb 11 oz (106 kg)   10/14/21 282 lb (127.9 kg)   03/11/21 276 lb (125.2 kg)     Body mass index is 30.83 kg/m².     CBC:   Lab Results   Component Value Date    WBC 13.9 02/27/2022    RBC 2.54 02/27/2022    HGB 7.1 02/27/2022    HCT 21.2 02/27/2022    MCV 87.7 02/27/2022    RDW 14.9 02/27/2022     02/27/2022       CMP:   Lab Results   Component Value Date     02/27/2022    K 5.4 02/27/2022    K 3.7 01/23/2022    CL 97 02/27/2022    CO2 20 02/27/2022    BUN 64 02/27/2022    CREATININE 5.4 02/27/2022    GFRAA 14 02/27/2022    AGRATIO 0.8 01/22/2022    LABGLOM 12 02/27/2022    LABGLOM 103 09/14/2015    GLUCOSE 138 02/27/2022    PROT 6.0 02/10/2022    CALCIUM 7.4 02/27/2022    BILITOT 0.5 02/10/2022    ALKPHOS 217 02/10/2022    AST 8 02/10/2022    ALT <5 02/10/2022       POC Tests:   Recent Labs     02/27/22  0707   POCGLU 137*       Coags:   Lab Results   Component Value Date    PROTIME 13.4 02/26/2022    INR 1.18 02/26/2022    APTT 67.1 01/24/2022       HCG (If Applicable): No results found for: PREGTESTUR, PREGSERUM, HCG, HCGQUANT     ABGs:   Lab Results   Component Value Date    PHART 7.467 02/16/2022    PO2ART 93.6 02/16/2022    ADR0BPA 31.7 02/16/2022    TNC8PED 22.4 02/16/2022    BEART -1.0 02/16/2022    K1KHDFFW 97.6 02/16/2022        Type & Screen (If Applicable):  No results found for: LABABO, LABRH    Drug/Infectious Status (If Applicable):  No results found for: HIV, HEPCAB    COVID-19 Screening (If Applicable):   Lab Results   Component Value Date    COVID19 Not Detected 01/22/2022           Anesthesia Evaluation   no history of anesthetic complications:   Airway: Mallampati: III  TM distance: >3 FB   Neck ROM: limited  Mouth opening: > = 3 FB Dental: normal exam         Pulmonary:   (+) pneumonia: resolved,  current smoker                          ROS comment: Intubated several times this admission   Cardiovascular:    (+) hypertension:, dysrhythmias: atrial fibrillation, CHF:, hyperlipidemia               ROS comment: Pt has arrested with CPR during this admission     Neuro/Psych:   (+) neuromuscular disease (polyneuropathy):, headaches:,             GI/Hepatic/Renal:   (+) PUD, renal disease: ARF and dialysis,          ROS comment: GI bleed s/p multiple blood transfusions. Endo/Other:    (+) DiabetesType II DM, , .                 Abdominal:             Vascular: Other Findings:        TTE procedure:ECHO 2D W/DOPPLER/COLOR/CONTRAST. Procedure Date  Date: 01/24/2022 Start: 11:02 AM     Study Location: [address] [place] [address] - Echo Lab  Technical Quality: Limited visualization due to patient on ventilator.     Additional Indications:MSSA BACTEREMIA.     Patient Status: Routine     Contrast Medium: Definity.     Height: 73 inches Weight: 293.01 pounds BSA: 2.53 m2 BMI: 38.66 kg/m2     BP: 84/58 mmHg      Conclusions      Summary   Definity contrast administered. Left ventricular systolic function is reduced with ejection fraction   estimated at 35 %. There is global hypokinesis with regional wall variation. There is mild concentric left ventricular hypertrophy. Normal left ventricular diastolic filling pressure. Right ventricular systolic function is moderately reduced . The right atrium is mildly dilated. Mild mitral regurgitation. Mild tricuspid regurgitation. Normal systolic pulmonary artery pressure (SPAP) estimated at 44 mmHg (RA   pressure 15 mmHg). Mild pulmonic regurgitation present. There is a small pericardial effusion noted. There is a pleural effusion. No valvular vegetations are visualization.       Signature      ------------------------------------------------------------------   Electronically signed by Herbert Núñez MD, [facility]   (Interpreting physician) on 01/24/2022 at 02:55 PM   ------------------------------------------------------------------      Findings      Left Ventricle   Definity contrast administered. Left ventricular systolic function is reduced with ejection fraction   estimated at 35 %. There is global hypokinesis with regional wall variation. Left ventricle size is normal.   There is mild concentric left ventricular hypertrophy. Normal left ventricular diastolic filling pressure. Mitral Valve   Mitral valve is structurally normal.   The mitral valve leaflets are normal in appearance and mobility. No evidence of mitral valve stenosis. Mild mitral regurgitation. No vegetation or masses are seen on the mitral valve. Left Atrium   The left atrium is normal in size. Aortic Valve   The aortic valve is normal in structure and function. There is no   significant aortic regurgitation. No vegetation or masses are seen on the aortic valve. Aorta   The aortic root is normal in size. Right Ventricle   Right ventricular systolic function is moderately reduced . The right ventricular size is normal.      Tricuspid Valve   Tricuspid valve is structurally normal.   Tricuspid valve leaflet mobility is normal.   Mild tricuspid regurgitation. Normal systolic pulmonary artery pressure (SPAP) estimated at 44 mmHg (RA   pressure 15 mmHg). No vegetation or masses are seen on the tricuspid valve. Right Atrium   The right atrium is mildly dilated. Pulmonic Valve   The pulmonic valve is normal in structure. No evidence of pulmonic valve stenosis. Mild pulmonic regurgitation present. No vegetation or masses are seen on the pulmonic valve. Pericardial Effusion   There is a small insignificant pericardial effusion noted. Pleural Effusion   There is a pleural effusion. Miscellaneous   IVC size is dilated (>2.1 cm) and collapses < 50% with respiration   consistent with elevated RA pressure (15 mmHg). No obvious masses, thrombi, or vegetations are noted. Patient is on mechanical ventilation.      M-Mode/2D Measurements (cm)      LV Diastolic Dimension: 1.96 cm LV Systolic Dimension: 4.45 cm   LV Septum Diastolic: 3.03 cm   LV PW Diastolic: 5.92 cm        AO Root Dimension: 3.4 cm                                      LA Area: 10.2 cm2                                   LA volume/Index: 25.67 ml /10 ml/m2     Doppler Measurements      AV Peak Velocity: 96.1 cm/s   MV Peak E-Wave: 60 cm/s   AV Peak Gradient: 3.69 mmHg   AV Mean Gradient: 2 mmHg      MV Mean Gradient: 1 mmHg   LVOT Peak Velocity: 68.1 cm/s                                 MV VTI:19.8 cm/s   TR Velocity:269 cm/s   TR Gradient:28.94 mmHg   Estimated RAP:15 mmHg   Estimated RVSP: 43.94 mmHg   E' Septal Velocity: 5.85 cm/s   E' Lateral Velocity: 7.8 cm/s   E/E' ratio: 9   PV Peak Velocity: 70.3 cm/s   PV Peak Gradient: 1.98 mmHg      Aortic Valve      Peak Velocity: 96.1 cm/s Mean Velocity: 66.3 cm/s   Peak Gradient: 3.69 mmHg Mean Gradient: 2 mmHg   AV VTI: 15.9 cm     Aorta      Aortic Root: 3.4 cm        Component 1 mo ago   Left Ventricular Ejection Fraction 35              Anesthesia Plan      general     ASA 4 - emergent     (Pt and wife agrees to risks, benefits and alternatives of GETA. High likelihood pt will remain intubated and ventilated post procedure  Questions answered. Willing to proceed with plan.)  Induction: intravenous. Anesthetic plan and risks discussed with patient.                     Duong Cain MD   2/27/2022

## 2022-02-27 NOTE — CONSULTS
Department of General Surgery Consult    PATIENT NAME: Autumn Jay   YOB: 1975    ADMISSION DATE: 1/22/2022  1:32 PM      TODAY'S DATE: 2/27/2022    Reason for Consult:  Duodenal ulcer bleed    Chief Complaint: same    Requesting Physician:  Mercy Reyes    HISTORY OF PRESENT ILLNESS:              The patient is a 42 y.o. male who presented with loc and renal failure. Also with infected extremity wounds. Worsening hypoxia and cardiopulmonary arrest on 1/23. More recently has had anemia and workup consistent with duodenal ulcer bleed. Has had multiple EGDs and underwent coil embolization of GDA with IR on 2/25. With persistent drop in hgb and transfusions underwent CTA yesterday which showed possible continued bleed in this area. EGD today showed bright blood in duodenum but unable to see to attempt control. Has received 10 units PRBC over the past 10 days.     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 myocarditis    Chronic osteomyelitis of left foot ([address] 10/27/2020    Closed displaced fracture of distal phalanx of right little finger 4/8/2021    Clostridium difficile infection 11/01/2016    Diabetic ulcer of left forefoot associated with type 2 diabetes mellitus, with necrosis of bone ([address] 8/1/2019    Diabetic ulcer of right great toe associated with type 2 diabetes mellitus, with necrosis of bone ([address] 08/2019    Failed soft 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 toe amputation site 10/26/2020    History of hyperbaric oxygen therapy 10/28/2020    [facility]- [facility] - compromised flap    Infective tenosynovitis of extensor tendons of left hand 2/3/2022    Migraine     Possible perforated tympanic membrane as a result of recurrent otitis    Post-op hematoma of left foot 11/12/2020    Recurrent otitis media     Septic shock (HCC)     Syncope     Tear of medial meniscus of left knee     Tobacco use     Toe osteomyelitis, left ([address] 7/24/2020    VAP (ventilator-associated pneumonia) [facility])        Past Surgical History:        Procedure Laterality Date    ARM SURGERY Left 02/05/2022    LEFT HAND INCISION AND DRAINAGE performed by Alma Bowling MD at [address] Left 02/01/2022    ULCER DEBRIDEMENT LEFT FOOT performed by Monica Reed DPM at [address] [facility]  [address] Right 02/25/2022    successful coil embolization of the gastroduodenal artery    IR NONTUNNELED VASCULAR CATHETER  02/11/2022    IR NONTUNNELED VASCULAR CATHETER 2/11/2022 [facility]    KNEE ARTHROSCOPY Left 08/15/2013    LEFT KNEE ARTHROSCOPY , SYNOVECTOMY       OTHER SURGICAL HISTORY Left 07/24/2020    PARTIAL LEFT FOOT AMPUTATION    TOE AMPUTATION Right 08/23/2019    PARTIAL RIGHT FOOT AMPUTATION performed by Monica Reed DPM at [address] [facility] [address] Left 07/24/2020    PARTIAL LEFT FOOT AMPUTATION performed by Monica Reed DPM at [address] [facility] [address] Left 10/22/2020    PARTIAL LEFT FOOT AMPUTATION WITH GRAFT APPLICATION performed by Monica Reed DPM at [address] N/A 02/17/2022    EGD W/ANES.  performed by Nasir Iraheta MD at [facility] EXTRACTION         Current Medications:   Current Facility-Administered Medications: 0.9 % sodium chloride infusion, , IntraVENous, PRN  insulin glargine (LANTUS) injection vial 10 Units, 10 Units, SubCUTAneous, BID  pantoprazole (PROTONIX) 80 mg in sodium chloride 0.9 % 100 mL infusion, 8 mg/hr, IntraVENous, Continuous  0.9 % sodium chloride infusion, , IntraVENous, PRN  ondansetron (ZOFRAN) injection 4 mg, 4 mg, IntraVENous, Q6H PRN  ampicillin-sulbactam (UNASYN) 3000 mg ivpb minibag, 3,000 mg, IntraVENous, 2 times per day  oxyCODONE-acetaminophen (PERCOCET) 5-325 MG per tablet 1 tablet, 1 tablet, Oral, Q4H PRN  epoetin [place]-epbx (RETACRIT) injection 10,000 Units, 10,000 Units, IntraVENous, Once per day on Tue Thu Sat  sucralfate (CARAFATE) tablet 1 g, 1 g, Oral, 4x Daily AC & HS  dilTIAZem (CARDIZEM) tablet 30 mg, 30 mg, Oral, 4 times per day  b complex-C-folic acid (NEPHROCAPS) capsule 1 mg, 1 capsule, Oral, Daily  carvedilol (COREG) tablet 12.5 mg, 12.5 mg, Oral, BID WC  insulin lispro (HUMALOG) injection vial 0-18 Units, 0-18 Units, SubCUTAneous, Q4H  povidone-iodine (BETADINE) 10 % external solution, , Topical, Daily  [Held by provider] heparin (porcine) injection 5,000 Units, 5,000 Units, SubCUTAneous, 3 times per day  heparin (porcine) injection 3,000 Units, 3,000 Units, IntraVENous, PRN  sodium chloride 0.9 % irrigation 1,000 mL, 1,000 mL, Irrigation, Continuous PRN  albumin human 25 % IV solution 12.5 g, 12.5 g, IntraVENous, PRN  heparin (porcine) injection 2,600 Units, 2,600 Units, IntraCATHeter, PRN  sodium chloride flush 0.9 % injection 5-40 mL, 5-40 mL, IntraVENous, 2 times per day  sodium chloride flush 0.9 % injection 5-40 mL, 5-40 mL, IntraVENous, PRN  0.9 % sodium chloride infusion, 25 mL, IntraVENous, PRN  acetaminophen (TYLENOL) tablet 650 mg, 650 mg, Oral, Q6H PRN **OR** acetaminophen (TYLENOL) suppository 650 mg, 650 mg, Rectal, Q6H PRN  glucose (GLUTOSE) 40 % oral gel 15 g, 15 g, Oral, PRN  glucagon (rDNA) injection 1 mg, 1 mg, IntraMUSCular, PRN  dextrose 5 % solution, 100 mL/hr, IntraVENous, PRN  dextrose bolus (hypoglycemia) 10% 125 mL, 125 mL, IntraVENous, PRN **OR** dextrose bolus (hypoglycemia) 10% 250 mL, 250 mL, IntraVENous, PRN  Prior to Admission medications    Medication Sig Start Date End Date Taking?  Authorizing Provider   Insulin Degludec (TRESIBA FLEXTOUCH) 200 UNIT/ML SOPN INJECT 76 UNITS INTO THE SKIN NIGHTLY 1/17/22  Yes Neris Giordano Sundeep Freitas MD   losartan (COZAAR) 50 MG tablet TAKE 1 TABLET BY MOUTH DAILY 1/13/22  Yes Cam Nicol, MD   tiZANidine (ZANAFLEX) 4 MG tablet TAKE 2 TABLETS BY MOUTH NIGHTLY 1/4/22  Yes Cam Hollister, MD   carvedilol (COREG) 12.5 MG tablet TAKE 1 TABLET BY MOUTH 2 TIMES DAILY (WITH MEALS) 12/30/21  Yes Cam Nicol, MD   rosuvastatin (CRESTOR) 20 MG tablet TAKE 1 TABLET BY MOUTH NIGHTLY 12/30/21  Yes Dandre Crawford MD   traZODone (DESYREL) 50 MG tablet TAKE 1 TABLET BY MOUTH NIGHTLY 12/7/21  Yes Cam Nicol, MD   furosemide (LASIX) 20 MG tablet TAKE 1 TABLET BY MOUTH DAILY 12/7/21  Yes Cam Nicol, MD   gabapentin (NEURONTIN) 400 MG capsule TAKE 2 CAPSULES BY MOUTH 3 TIMES DAILY FOR 90 DAYS. 11/30/21 2/28/22 Yes Bo Camarena MD   insulin lispro (HUMALOG KWIKPEN) 200 UNIT/ML SOPN pen Inject 25 Units into the skin 3 times daily (before meals) 10/14/21  Yes Cam MD Nicol   glimepiride (AMARYL) 4 MG tablet TAKE 1 TABLET BY MOUTH EVERY MORNING (BEFORE BREAKFAST). 9/10/21  Yes Bo Camarena MD   PARoxetine (PAXIL) 10 MG tablet TAKE 1 TABLET BY MOUTH EVERY MORNING 9/10/21  Yes Bo Camarena MD   blood glucose test strips (ONETOUCH ULTRA) strip USE TO TEST BLOOD GLUCOSE DAILY. DX:E11.9 3/5/21  Yes Bo Camarena MD   Blood Glucose Monitoring Suppl (CONTOUR NEXT EZ) w/Device KIT Use to test glucose daily. DX:E11.9 12/10/20  Yes Bo Camarena MD   Insulin Pen Needle 32G X 6 MM MISC Use with insulin daily . DX:E11.9 12/4/20  Yes Bo Camarena MD   blood glucose monitor strips Use to test blood sugar daily.   DX:E11.9 12/4/20  Yes Bo Camarena MD   oxymetazoline (AFRIN) 0.05 % nasal spray 2 sprays by Nasal route daily Indications: prior to HBO   Yes Historical Provider, MD   loratadine (CLARITIN) 10 MG tablet Take 10 mg by mouth nightly as needed    Yes Historical Provider, MD   sildenafil (VIAGRA) 100 MG tablet TAKE 1 TABLET BY MOUTH AS NEEDED FOR ERECTILE DYSFUNCTION 19  Yes Jh Chapman MD        Allergies:  Januvia [sitagliptin], Metformin and related, Vancomycin, and Mustard oil [allyl isothiocyanate]    Social History:   Social History     Socioeconomic History    Marital status:      Spouse name: Not on file    Number of children: Not on file    Years of education: Not on file    Highest education level: Not on file   Occupational History    Not on file   Tobacco Use    Smoking status: Former Smoker     Packs/day: 0.50     Years: 2.00     Pack years: 1.00     Quit date: 2005     Years since quittin.5    Smokeless tobacco: Former User     Quit date: 2005    Tobacco comment: SMOKED OCCASIONALLY UNTIL 8 YEARS AGO   Vaping Use    Vaping Use: Never used   Substance and Sexual Activity    Alcohol use: Yes     Comment: RARELY    Drug use: No    Sexual activity: Yes     Partners: Female   Other Topics Concern    Not on file   Social History Narrative    Not on file     Social Determinants of Health     Financial Resource Strain:     Difficulty of Paying Living Expenses: Not on file   Food Insecurity:     Worried About [address] in the Last Year: Not on file    [address] in the Last Year: Not on file   Transportation Needs:     Lack of Transportation (Medical): Not on file    Lack of Transportation (Non-Medical):  Not on file   Physical Activity:     Days of Exercise per Week: Not on file    Minutes of Exercise per Session: Not on file   Stress:     Feeling of Stress : Not on file   Social Connections:     Frequency of Communication with Friends and Family: Not on file    Frequency of Social Gatherings with Friends and Family: Not on file    Attends Buddhist Services: Not on file    Active Member of Clubs or Organizations: Not on file    Attends Club or Organization Meetings: Not on file    Marital Status: Not on file   Intimate Partner Violence:     Fear of Current or Ex-Partner: Not on file    Emotionally Abused: Not on file    Physically Abused: Not on file    Sexually Abused: Not on file   Housing Stability:     Unable to Pay for Housing in the Last Year: Not on file    Number of Places Lived in the Last Year: Not on file    Unstable Housing in the Last Year: Not on file         Family History:        Problem Relation Age of Onset    Diabetes Mother     High Blood Pressure Mother     High Cholesterol Mother     Diabetes Father     High Blood Pressure Father     High Cholesterol Father     Stroke Father     High Blood Pressure Sister     High Blood Pressure Maternal Uncle     High Cholesterol Maternal Uncle     High Blood Pressure Maternal Grandmother        REVIEW OF SYSTEMS:  CONSTITUTIONAL:  negative  HEENT:  negative  RESPIRATORY:  negative  CARDIOVASCULAR:  negative  GASTROINTESTINAL:  negative except for hemtochezia  GENITOURINARY:  negative  HEMATOLOGIC/LYMPHATIC:  negative  NEUROLOGICAL:  Negative  * All other ROS reviewed and negative. PHYSICAL EXAM:  VITALS:  /84   Pulse 111   Temp 99.2 °F (37.3 °C)   Resp 18   Ht 6' 1\" (1.854 m)   Wt 233 lb 11 oz (106 kg)   SpO2 98%   BMI 30.83 kg/m²   24HR INTAKE/OUTPUT:    I/O last 3 completed shifts: In: 2009.8 [P.O.:460; I.V.:64; Blood:1025; NG/GT:60; IV Piggyback:400.8]  Out: 2020 [Urine:770; Stool:1250]  No intake/output data recorded.       CONSTITUTIONAL:  alert, no apparent distress and mildly obese  EYES:  PERRL, sclera clear  ENT:  Normocephalic,atraumatic, without obvious abnormality  NECK:  supple, symmetrical, trachea midline  LUNGS: Resp effort easy and unlabored, no crackles or wheezing  CARDIOVASCULAR:  NO JVD, tachycardic  ABDOMEN:  , normal bowel sounds, soft, non-distended, non-tender,  MUSCULOSKELETAL mild edema  NEUROLOGIC:  Mental Status Exam:  Level of Alertness:   awake  PSYCHIATRIC:   person, place, time  SKIN:  no rashes    DATA:    CBC:   Recent Labs 02/25/22  0403 02/25/22  1130 02/26/22  0407 02/26/22  1255 02/26/22  2010 02/27/22  0215 02/27/22  0743   WBC 14.5*  --  11.8*  --   --  13.9*  --    HGB 5.5*   < > 7.0*   < > 7.0* 7.6* 7.1*   HCT 16.8*   < > 20.7*   < > 20.5* 22.2* 21.2*     --  270  --   --  209  --     < > = values in this interval not displayed. BMP:    Recent Labs     02/25/22  0403 02/26/22  0407 02/27/22  0215    134* 129*   K 4.8 4.9 5.4*    100 97*   CO2 25 23 20*   BUN 47* 62* 64*   CREATININE 3.8* 4.7* 5.4*   GLUCOSE 198* 175* 138*     Hepatic: No results for input(s): AST, ALT, ALB, BILITOT, ALKPHOS in the last 72 hours. Mag:    No results for input(s): MG in the last 72 hours. Phos:     Recent Labs     02/25/22  0403 02/26/22  0407 02/27/22  0215   PHOS 4.0 6.2* 6.9*      INR:   Recent Labs     02/25/22  1007 02/26/22  1540   INR 1.20* 1.18*       Radiology Review: Images personally reviewed by me. CTA - possible bleed and pooling in duodenum, coil in place      IMPRESSION/RECOMMENDATIONS:    43 yo with duodenal ulcer bleed  Given his prior interventions, transfusion requirement and continued bleeding will need operative intervention and oversew for better control. Discussed risks of operation with patient and family including continued bleeding, infection, stenosis. Also discussed possibility of feeding tube.     Electronically signed by Tony South, [address] [place] [address]  [id number]
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
## 2022-02-28 NOTE — PROGRESS NOTES
Data:  CBC:   Recent Labs     02/27/22  0215 02/27/22  0743 02/27/22  1645 02/27/22  2035 02/28/22  0310   WBC 13.9*  --  21.2*  --  18.9*   HGB 7.6*   < > 8.3* 8.0* 7.2*   HCT 22.2*   < > 25.1* 23.6* 21.3*   MCV 87.7  --  89.2  --  88.8     --  219  --  210    < > = values in this interval not displayed. BMP:   Recent Labs     02/26/22  0407 02/26/22  0407 02/27/22  0215 02/27/22  1645 02/28/22  0310   *   < > 129* 135* 141   K 4.9   < > 5.4* 5.8* 5.1      < > 97* 104 101   CO2 23   < > 20* 17* 20*   PHOS 6.2*  --  6.9*  --  6.8*   BUN 62*   < > 64* 68* 44*   CREATININE 4.7*   < > 5.4* 5.3* 3.8*    < > = values in this interval not displayed. LIVER PROFILE:   No results for input(s): AST, ALT, LIPASE, BILIDIR, BILITOT, ALKPHOS in the last 72 hours. Invalid input(s): AMYLASE,  ALB    Microbiology:  1/22 [facility] MSSA  1/22 COVID-19 and influenza negative  1/23/22 Blood cx: NGTD  1/23 tracheal aspirate MSSA  1/24 Wound cx: MSSA  1/24 BC MSSA  1/29/22 BAL pending  1/30/2022 blood sent  1/31/2022 left hand Enterococcus and staph aureus  2/1/2022 bone MSSA  2/5/2022 surgical hand culture E. Faecalis  2/13 BAL NRF   2/13 BC NG      Imaging:   CT chest 2/24/2022  Cavitary right upper lobe pneumonia    Echo 1/25/22:   EF 35%, global HK, reduced RV function    ACT 2/26/22  There is a curvilinear area of arterial phase enhancement along the posterolateral proximal duodenal wall (images 70 1-76) as well as pooling   of contrast material in this location and slightly cranial to it on delayed phase images (image 70-72), findings which raise concern for residual or recurrent bleed within the location of the proximal duodenum.  This is in proximity to the metallic gastroduodenal artery embolization coils.  No gastric or duodenal ulcer or wall thickening is apparent.      ASSESSMENT:  · Acute hypoxemic respiratory failure - recurrent and has been intubated 3 times  · VAP LLL   · Abnormal CXR with R mid lung cavitary lesion - likely septic emboli from recent bacteremia  · Acute GIB - EGD 2/17 3.5 cm gastric and duodenal bulb ulcer  · S/P GDA embolization 2/25/22  · Probable recurrent proximal duodenal bleed on 2/26/22 CTA  · Post-op oversew of duodenal ulcer, pyloroplasty and feeding jejunostomy 2/27/22  · Cardiopulmonary arrest x 2 1/23/2022, required CPR, TTM - rewarmed 8 pm 1/25/22  · Acute kidney failure  · MSSA bacteremia  · DM with hyperglycemia   · L foot abscess drained 1/24/2022, MSSA; MRI with large fluid collection and changes c/w osteomyelitis, s/p debridement by Dr. Erin Null on 2/1/22  · L hand burn with deep tissue injury & abscess, s/p debridement on 2/5/22  · Paroxysmal AFIB/flutter with RVR  · Cardiomyopathy EF 35% on Echo 1/24/22     PLAN:  Continue Unasyn per ID   Nephrology following for HD  SLP following, can resume TF per GI   Lantus decreased to 10 QD while NPO  Percocet PRN for pain   Protonix gtt per Gastroenterology    S/P 3 U PRBC prior to 2/18/22; 1 U PRBC 2/24/22; 4 U PRBC 2/25/22; 2 U PRBC 2/26/22, 2 U 2/27/22, 1 U 2/28/22  SCDs  Okay for PCU

## 2022-02-28 NOTE — CARE COORDINATION
INTERDISCIPLINARY PLAN OF CARE CONFERENCE    Date/Time: 2/28/2022 10:18 AM  Completed by: ROSALIA Sevilla   Case Management      Patient Name:  Sara Charlton  YOB: 1975  Admitting Diagnosis: Hyperglycemia [R73.9]  RODRICK (acute kidney injury) ([facility] [N17.9]  Acute renal failure, unspecified acute renal failure type ([facility] [N17.9]  Syncope, unspecified syncope type [R55]     Admit Date/Time:  1/22/2022  1:32 PM    Chart reviewed. Interdisciplinary team contacted or reviewed plan related to patient progress and discharge plans. Disciplines included Case Management, Nursing, and Dietitian. Current Status:Ongoing. J-tube. NG.   PT/OT recommendation for discharge plan of care: SNF    Expected D/C Disposition:  [address] [facility]     Discharge Plan Comments: Chart review completed. Pt remains in the ICU. Completed Interdisciplinary rounds with ICU staff; Dr. Qing Brown states to not start precert at this time for [facility]. He is aware SNF's have denied because of insurance and transportation to/from HD. Spoke with Kaela Barbosa at [facility] who is updated on the above and states will continue to follow to start precert once medically stable. Spoke with Roseann Recinos at [facility] admissions of pt still remaining in the hospital and to continue to hold the HD slot at [facility]. CM will continue to follow and assist. Please notify CM if needs or concerns arise.     Home O2 in place on admit: No

## 2022-02-28 NOTE — PROGRESS NOTES
[facility] Infectious Disease Progress Note      Chantell Strauss     : 1975    DATE OF VISIT:  2022  DATE OF ADMISSION:  2022       Subjective:     Chantell Strauss is a 42 y.o. male whom I've been seeing for a deep infection of the left foot and left hand, initially with MSSA bacteremia. Since I last saw him, from an ID and wound-care standpoint, not a lot has happened, still a low-grade temp here and there, and a multifactorial leukocytosis, but the bigger news revolved around his duodenal ulcer -- still signs of active blood loss over the weekend, repeat EGD yesterday morning showed active duodenal bleeding, but a focal source wasn't able to be seen despite irrigation and suctioning, so since he failed both endoscopic and angiographic treatment, he went to the OR yesterday afternoon for an ex lap, oversewing of the bleeding DU, a pyloroplasty, and placement of a feeding jejunostomy; drain also left in place. Today he does complain of some expected post-op abd pain. No F/C/D, not really short of breath, not coughing much, no CP, no dizziness. Still with some diarrhea.      Mr. Jessica Gaona has a past medical history of Abscess of left foot, Abscess of left hand, Acute encephalopathy, Acute hypoxemic respiratory failure ([address] Acute osteomyelitis of right hallux ([address] Cellulitis of left foot, CHF (congestive heart failure) ([address] Chronic osteomyelitis of left foot ([address] Closed displaced fracture of distal phalanx of right little finger, Clostridium difficile infection, Diabetic ulcer of left forefoot associated with type 2 diabetes mellitus, with necrosis of bone ([address] Diabetic ulcer of right great toe associated with type 2 diabetes mellitus, with necrosis of bone ([address] Failed soft 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 toe amputation site, History of hyperbaric oxygen therapy, Infective tenosynovitis of extensor tendons of left hand, Migraine, Possible perforated tympanic membrane, Post-op hematoma of left foot, Recurrent otitis media, Septic shock ([facility] Syncope, Tear of medial meniscus of left knee, Tobacco use, Toe osteomyelitis, left ([address] and VAP (ventilator-associated pneumonia) ([facility] [address] [facility]     Current Facility-Administered Medications: 0.9 % sodium chloride infusion, , IntraVENous, PRN  metoprolol (LOPRESSOR) injection 5 mg, 5 mg, IntraVENous, Q6H  [START ON 3/1/2022] insulin glargine (LANTUS) injection vial 10 Units, 10 Units, SubCUTAneous, QAM  furosemide (LASIX) injection 40 mg, 40 mg, IntraVENous, Once  HYDROmorphone (DILAUDID) injection 1 mg, 1 mg, IntraVENous, Q2H PRN  pantoprazole (PROTONIX) 80 mg in sodium chloride 0.9 % 100 mL infusion, 8 mg/hr, IntraVENous, Continuous  ondansetron (ZOFRAN) injection 4 mg, 4 mg, IntraVENous, Q6H PRN  ampicillin-sulbactam (UNASYN) 3000 mg ivpb minibag, 3,000 mg, IntraVENous, 2 times per day  oxyCODONE-acetaminophen (PERCOCET) 5-325 MG per tablet 1 tablet, 1 tablet, Oral, Q4H PRN  epoetin [place]-epbx (RETACRIT) injection 10,000 Units, 10,000 Units, IntraVENous, Once per day on Tue Thu Sat  [Held by provider] sucralfate (CARAFATE) tablet 1 g, 1 g, Oral, 4x Daily AC & HS  [Held by provider] dilTIAZem (CARDIZEM) tablet 30 mg, 30 mg, Oral, 4 times per day  [Held by provider] b complex-C-folic acid (NEPHROCAPS) capsule 1 mg, 1 capsule, Oral, Daily  [Held by provider] carvedilol (COREG) tablet 12.5 mg, 12.5 mg, Oral, BID WC  insulin lispro (HUMALOG) injection vial 0-18 Units, 0-18 Units, SubCUTAneous, Q4H  povidone-iodine (BETADINE) 10 % external solution, , Topical, Daily  [Held by provider] heparin (porcine) injection 5,000 Units, 5,000 Units, SubCUTAneous, 3 times per day  heparin (porcine) injection 3,000 Units, 3,000 Units, IntraVENous, PRN  sodium chloride 0.9 % irrigation 1,000 mL, 1,000 mL, Irrigation, Continuous PRN  albumin human 25 % IV solution 12.5 g, 12.5 g, IntraVENous, PRN  heparin (porcine) injection 2,600 Units, 2,600 Units, IntraCATHeter, PRN  sodium chloride flush 0.9 % injection 5-40 mL, 5-40 mL, IntraVENous, 2 times per day  sodium chloride flush 0.9 % injection 5-40 mL, 5-40 mL, IntraVENous, PRN  0.9 % sodium chloride infusion, 25 mL, IntraVENous, PRN  acetaminophen (TYLENOL) tablet 650 mg, 650 mg, Oral, Q6H PRN **OR** acetaminophen (TYLENOL) suppository 650 mg, 650 mg, Rectal, Q6H PRN  glucose (GLUTOSE) 40 % oral gel 15 g, 15 g, Oral, PRN  glucagon (rDNA) injection 1 mg, 1 mg, IntraMUSCular, PRN  dextrose 5 % solution, 100 mL/hr, IntraVENous, PRN  dextrose bolus (hypoglycemia) 10% 125 mL, 125 mL, IntraVENous, PRN **OR** dextrose bolus (hypoglycemia) 10% 250 mL, 250 mL, IntraVENous, PRN     This is day 26 of Enterococcal therapy, day 37 of MSSA therapy, POD 27 for the foot, POD 23 for the hand. Allergies: Januvia [sitagliptin], Metformin and related, Vancomycin, and Mustard oil [allyl isothiocyanate]    Pertinent items from the review of systems are discussed in the HPI; the remainder of the ROS was reviewed and is negative.      Objective:     Vital signs over the last 24 hours:  Temp  Av °F (37.2 °C)  Min: 97.8 °F (36.6 °C)  Max: 100.1 °F (37.8 °C)  Pulse  Av.6  Min: 108  Max: 975  Systolic (22ZBE), VMZ:974 , Min:76 , OZW:269   Diastolic (87PZX), JAJ:07, Min:49, Max:94  Resp  Av.5  Min: 0  Max: 28  SpO2  Av.7 %  Min: 85 %  Max: 100 %      Constitutional:  well-developed, well-nourished, alert, more weak and fatigued today, in some pain, does not look toxic  Neuropsych: awake, alert, interactive, not anxious or agitated  Eyes:  pupils equal, round, reactive to light; sclerae anicteric, conjunctivae pale  ENT:  atraumatic; no labial ulcers; no thrush; NGT in place; mucous membranes relatively moist  Resp:  decreased at the bases, clearer overall  Cardiovascular:  heart regular, tachy again, no murmur; generally mild extremity edema; right PICC in place, and a right IJ HD line, exit sites benign, maybe increasing RUE edema  GI:  abdomen soft, expected degree of tenderness, mildly distended, hypoactive bowel sounds, no palpable masses or organomegaly; rectal tube in place, with ongoing diarrhea (quite bloody today); on the abdomen, surgical site covered, right sided drain in place, left sided J-tube  : Munoz in place, increasing amount of clearer yellow urine now  Musculoskeletal:  no clubbing, cyanosis or petechiae; extremities with no gross effusions or acute arthritis  Skin: warm, dry, normal turgor; no acute rash, no peripheral stigmata of endocarditis.     Scalp wound a small, stable eschar, softening a bit. Sacral ulcer with slowly regressing and loosening eschar, some good peripheral pink dermal tissue - left side looks better than the right; some cloudy exudate, but I think it's more serum + Triad, as opposed to pus. Left hand with the main wound bed red, granular to almost hypergranular, clean, a bit of fibrin, no pus, tunnels resolved; two small focal eschars along the dorsal-medial hand with a bit of cloudy debris beneath. Left foot generally with good granulation, still a few exposed tendons, some peripheral sloughy necrotic fat and fascial tissue; the medial cuneiform is harder and not discolored like it was last week, but also not granulating over - I can feel some additional exposed bone medial-proximal to that, and then can also feel a bit of bone more dorsally, down in between a couple of those exposed extensor tendons; no pus.   ______________________________    Recent Labs     02/28/22  0810 02/28/22  0310 02/27/22  2035 02/27/22  1645 02/27/22  1645 02/27/22  0743 02/27/22  0215   WBC  --  18.9*  --   --  21.2*  --  13.9*   HGB 6.5* 7.2* 8.0*   < > 8.3*   < > 7.6*   HCT 19.0* 21.3* 23.6*   < > 25.1*   < > 22.2*   MCV  --  88.8  --   --  89.2  --  87.7   PLT  --  210  --   --  219  --  209    < > = values in this interval not displayed.      Lab Results   Component Value Date    CREATININE 3.8 (H) 02/28/2022 Lab Results   Component Value Date    LABALBU 2.7 (L) 02/28/2022     Lab Results   Component Value Date    ALT <5 (L) 02/10/2022    AST 8 (L) 02/10/2022     (H) 01/31/2022    ALKPHOS 217 (H) 02/10/2022    BILITOT 0.5 02/10/2022      Lab Results   Component Value Date    LABA1C 10.6 01/23/2022     Other recent pertinent labs: Anion gap 20 today. Glucoses mostly in the 100s. ANC 17k, no Eos.  ______________________________    Recent pertinent micro results:  Nothing new from this past week.  ______________________________    Recent imaging results (last 7 days):     XR ABDOMEN (KUB) (SINGLE AP VIEW)    Result Date: 2/26/2022  Small amount of contrast in the colon, similar to CT of 02/25/2022. RECOMMENDATION: If upper gastrointestinal hemorrhage is suspected, proceed with CTA. If lower gastrointestinal hemorrhage is suspected, there would likely be limitation on CTA related to contrast in the colon; proceed with hesitation, preferably delay study. CT CHEST WO CONTRAST    Result Date: 2/24/2022  1. Right upper lobe cavitary airspace disease concerning for pneumonia. Recommend CT of the chest with contrast in 8 weeks to confirm resolution. 2. Nasogastric tube terminating in the proximal gastric body should be advanced approximately 5 cm. 3. Small left pleural effusion with atelectasis     CTA ABDOMEN W WO CONTRAST    Addendum Date: 2/27/2022    ADDENDUM: There is a curvilinear area of arterial phase enhancement along the posterolateral proximal duodenal wall (images 70 1-76) as well as pooling of contrast material in this location and slightly cranial to it on delayed phase images (image 70-72), findings which raise concern for residual or recurrent bleed within the location of the proximal duodenum. This is in proximity to the metallic gastroduodenal artery embolization coils. No gastric or duodenal ulcer or wall thickening is apparent.  ADDENDUM: Three-dimensional image post processing was performed at a remote workstation. Addendum Date: 2/26/2022    ADDENDUM: There is a curvilinear area of arterial phase enhancement along the posterolateral proximal duodenal wall (images 70 1-76) as well as pooling of contrast material in this location and slightly cranial to it on delayed phase images (image 70-72), findings which raise concern for residual or recurrent bleed within the location of the proximal duodenum. This is in proximity to the metallic gastroduodenal artery embolization coils. No gastric or duodenal ulcer or wall thickening is apparent. Result Date: 2/27/2022  Wide patency of the mesenteric arteries and veins. No pseudoaneurysm, active bleed, or significant mesenteric artery occlusive disease. Interval coil embolization of the gastroduodenal artery. No acute or significant intestinal ischemia. Cholelithiasis. IR ANGIOGRAM SUPERIOR MESENTERIC    Result Date: 2/25/2022  No active extravasation or pseudoaneurysm formation. Successful coil embolization of the gastroduodenal artery. FL MODIFIED BARIUM SWALLOW W VIDEO    Result Date: 2/24/2022  Swallowing mechanism grossly within normal limits without evidence of aspiration. Please see separate speech pathology report for full discussion of findings and recommendations. CTA ABDOMEN PELVIS W WO CONTRAST    Result Date: 2/25/2022  Poor opacification of the abdominal aorta and its branches. No evidence of active GI bleed on this study, though study is limited secondary to presence of contrast and other radiopaque material within the large bowel.  RECOMMENDATIONS: Unavailable      Assessment:     Patient Active Problem List   Diagnosis Code    Hypertension I10    Uncontrolled type 2 diabetes mellitus with diabetic polyneuropathy (HCC) E11.42, E11.65    Cardiomyopathy ([address] I42.9    Mixed hyperlipidemia E78.2    Allergic rhinitis J30.9    Osteoarthritis M19.90    Acute osteomyelitis of left foot ([address] M86.172    RODRICK (acute kidney injury) ([facility] N17.9    MSSA bacteremia R78.81, B95.61    Third degree burn of left hand T23.302A    Cardiopulmonary arrest (HCC) I46.9    Hypertriglyceridemia E78.1    Staphylococcal arthritis of left foot ([facility]) M00.072    Antibiotic-associated diarrhea K52.1, T36.95XA    Enterococcal infection A49.1    Acute osteomyelitis of left hand ([facility]) M86.142    Pressure injury of deep tissue of sacral region L89.156    Severe protein-calorie malnutrition ([facility]) E43    Paroxysmal atrial fibrillation ([facility]) I48.0    Upper GI bleeding K92.2    Duodenal ulcer K26.9    Diabetic ulcer of left midfoot associated with type 2 diabetes mellitus, with necrosis of bone ([facility]) E11.621, L97.424    Probable abscess of upper lobe of right lung without pneumonia ([facility]) J85.2    Gastrointestinal hemorrhage K92.2    Abnormal CXR R93.89     Assessment of today's active condition(s):      --          Background of uncontrolled DM2, neuropathy, no known PAD, multiple prior diabetic foot ulcers, infections, surgeries. Most recent wounds were at the left 1st and 2nd ray, but they had been healed for just about a year.      --          Admission this time with septic shock related primarily to deep MSSA foot infection (cellulitis, abscess, septic arthritis, acute osteo), with acute renal failure, lactic acidosis, then cardiac arrest, resuscitation, acute respiratory failure, rapid Afib. Shock resolved, respiratory failure resolved (I think perhaps mostly due to CHF / fluid overload after cardiac arrest).  Initial sepsis finally resolved, after aggressive OR treatment of left foot and left hand infections. Left foot wound overall doing better, but still some necrotic deep soft tissue and bone - I think a bit more work is needed, beyond what I can safely do at bedside.      --          Ongoing ARF, on intermittent HD, but U/O improving a bit.      --          Third degree burn of left hand, with purulence beneath the lysing eschar seen to be running along extensor tendons - MRI and clinical exams c/w soft tissue abscess, septic tenosynovitis, possible acute septic arthritis at the 3rd MCPJ, with adjacent acute metacarpal and phalangeal osteo. Definitely need to treat the Enterococcus, with it isolated from OR Cxs. Much improved these last two weeks, pus gone, tunnels closed, just those two small foci of eschar and necrosis to deal with.     --          Resolved encephalopathy, likely multifactorial (cardiac arrest, ICU stay, sedatives, sepsis, renal failure, etc). Also wondering if he might not have a significant degree of critical-illness myopathy and/or neuropathy. Peripheral strength does seem better today than late last week, at least distal UE's.      --          Colonization with Candida, not surprising given DM, prior steroids, extensive Abx Rx.      --          Unstageable pressure ulcer of sacrum and scalp -- conservative Rx for now, with Triad - looking a bit better.      --          About 2-3 weeks ago a setback with difficulty in clearing secretions, hypoxemia, worsening mental status, rapid Afib, reintubation. I think this was more of an issue of retained secretions and perhaps aspiration pneumonitis, as opposed to a true invasive pneumonia. Respiratory function seemed to improve quickly after bronch and supportive care, nothing clearly new on CXR, FIO2 has come down, WBC quickly back down to normal, and nothing on bronch wash / BAL. Now extubated, and looking better than he has recently. Off O2 now. Swallow eval to be done.     --          He does look to have a small RUL lung abscess on recent CXR and then CT scan. Could have been a septic embolic process that then cavitated, or could have been an abscess that resulted from that episode of probable aspiration a couple of weeks ago.      --          Persistent / recurrent anemia, heme (+), large DU found at EGD. Still requiring PRBCs at times.  Failed to stop bleeding despite endoscopic and IR treatment, so now POD 1 from a DU oversew, pyloroplasty, J-tube placement.      --          Multifactorial diarrhea (antibiotics, tube feeds, GI bleed. ...). Negative for Cdiff a few weeks ago.      --          Likely a reactive leukocytosis and low-grade fever now, related to wounds, GI bleeding, perhaps the small lung abscess, areas of atelectasis, possible intermittent aspiration etc. Would just follow for now, watch clinically for signs of new nosocomial infection. The Unasyn would treat the lung abscess if Staph, and should also treat it if polymicrobial from recent aspiration, since only normal keith (and Candida) were recovered on bronch. Treatment recs:     No change in Abx. Follow labs periodically; watch for thrush, Cdiff, line infection, allergic reaction, etc. Not sure how accurate his ESRs really are, with the severe anemia and renal failure -- will rely more on cRP for Abx decisions in the coming couple of weeks. Ordered a new cRP for this week, but that will likely be thrown off by the stress of surgery yesterday. Will be stopping the Enterococcal therapy at the end of this week, and just focus on the MSSA for any residual foot infection; if that lung abscess did start at the time of that presumed aspiration event, he will have had 3 weeks of Unasyn for that, which seems reasonable.      Getting HD still. When renal function improves enough, will adjust Abx dose accordingly.     No change in wound care for right now, apart from a bit of Triad to those two small eschars near the main left hand wound.  I started to attempt a bit of bedside soft tissue debridement today, but when I realized how much bone was still involved, I backed off, and I think he'll need to go to the OR to clean that out better -- perhaps late this week or next week, depending on how quickly he recovers from the GI surgery?     Swallow evaluation still needs to be done, and he'll need a lot of work with PT and OT.      Eventual repeat CT scan of the chest in a couple of months, hopefully to document clearance of that presumed abscess.     D/W his mom at bedside today.      Electronically signed by Elmer Austin MD on 2/28/2022 at 11:24 AM.
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
## 2022-02-28 NOTE — PROGRESS NOTES
Nephrology Progress Note   [email or web address]      Sub/interval history  Resting  1 pRBC to be transfused on 2/28  Surgery on 2/22 for DU    HD on 2/27 with net UF +200 ml , post wt 105.5 kg     Last 24 h uop 710 ml ( received 100 mg IV lasix x 1 on 2/27)    ROS: No chest pain/shortness of breath/fever/nausea/vomiting  PSFH: + visitor    Scheduled Meds:   insulin glargine  10 Units SubCUTAneous BID    ampicillin-sulbactam  3,000 mg IntraVENous 2 times per day    epoetin [place]-epbx  10,000 Units IntraVENous Once per day on Tue Thu Sat    sucralfate  1 g Oral 4x Daily AC & HS    dilTIAZem  30 mg Oral 4 times per day    b complex-C-folic acid  1 capsule Oral Daily    carvedilol  12.5 mg Oral BID WC    insulin lispro  0-18 Units SubCUTAneous Q4H    povidone-iodine   Topical Daily    [Held by provider] heparin (porcine)  5,000 Units SubCUTAneous 3 times per day    sodium chloride flush  5-40 mL IntraVENous 2 times per day     Continuous Infusions:   pantoprazole 8 mg/hr (02/28/22 0400)    sodium chloride      sodium chloride      sodium chloride 25 mL (02/27/22 0548)    dextrose       PRN Meds:. HYDROmorphone, sodium chloride, ondansetron, oxyCODONE-acetaminophen, heparin (porcine), sodium chloride, albumin human, heparin (porcine), sodium chloride flush, sodium chloride, acetaminophen **OR** acetaminophen, glucose, glucagon (rDNA), dextrose, dextrose bolus (hypoglycemia) **OR** dextrose bolus (hypoglycemia)    Objective/     Vitals:    02/28/22 0500 02/28/22 0600 02/28/22 0700 02/28/22 0800   BP: 126/81 119/71 112/73 103/69   Pulse: 122 116 116 114   Resp: 19 14 13 15   Temp:   99.1 °F (37.3 °C)    TempSrc:   Bladder    SpO2: 97% 98% 99% 97%   Weight: 243 lb 6.2 oz (110.4 kg)      Height:         24HR INTAKE/OUTPUT:      Intake/Output Summary (Last 24 hours) at 2/28/2022 0825  Last data filed at 2/28/2022 0400  Gross per 24 hour   Intake 3467.17 ml   Output 3115 ml   Net 352.17 ml     Gen: alert, awake  Neck: No JVD  Skin: Unremarkable  Cardiovascular:  S1, S2 without m/r/g   Respiratory: decreased BS  Abdomen:  Soft,drains in situ   Extremities: trace lower extremity edema  Neuro/Psy: AAoriented times 3 ; moves all 4 ext    Data/  Recent Labs     02/27/22  0215 02/27/22  0743 02/27/22  1645 02/27/22  2035 02/28/22  0310   WBC 13.9*  --  21.2*  --  18.9*   HGB 7.6*   < > 8.3* 8.0* 7.2*   HCT 22.2*   < > 25.1* 23.6* 21.3*   MCV 87.7  --  89.2  --  88.8     --  219  --  210    < > = values in this interval not displayed. Recent Labs     02/26/22  0407 02/26/22  0407 02/27/22  0215 02/27/22  1645 02/28/22  0310   *   < > 129* 135* 141   K 4.9   < > 5.4* 5.8* 5.1      < > 97* 104 101   CO2 23   < > 20* 17* 20*   GLUCOSE 175*   < > 138* 186* 117*   PHOS 6.2*  --  6.9*  --  6.8*   BUN 62*   < > 64* 68* 44*   CREATININE 4.7*   < > 5.4* 5.3* 3.8*   LABGLOM 14*   < > 12* 12* 17*   GFRAA 16*   < > 14* 14* 21*    < > = values in this interval not displayed.        Assessment/     -RODRICK likely related to prerenal factors in the setting of sepsis, use of diuretics and losartan contributing to multifactorial ATN. Terrie Mayfield is not suggestive of staph associated glomerulonephritis.  Patient did not respond to IV fluids and developed acute pulmonary edema and renal replacement therapy initiated on 1/23/22        Now had dialysis dependence x 4 weeks, making some urine but limited recovery         On TTS schedule but missed on Saturday due to other medical issues        -Acute pulmonary edema            Resolved with dialysis / now looks euvolemic      -S/P Cardio respiratory arrest             Prolonged hospital stay       -Sepsis with MSSA bacteremia with left foot abscess s/p drainage, osteomyelitis, left hand I&D            Low-grade fever on 2/19     -Anemia - prn prbc's     -Diabetes, uncontrolled     -Hypertension     -Weakness:  Prolonged ICU stay following MSSA bacteremia complicated by cardiac arrest. Now with critical illness myopathy and likely neuropathy      -Anemia:  Acute GI bleed. s/p IR embolization of the gastroduodenal artery  On 2/25/22. CT angiogram with no active bleeding. OR w exp lap and oversew of DU, pylorplasty on 2/27/22   S/p multiple units of blood transfusion          Plan/      HD per TTS  Schedule , next on 3/1  Lasix as ordered  Serial labs       Silvia Addison MD  Office: [phone]  Fax:    [phone]

## 2022-02-28 NOTE — FLOWSHEET NOTE
02/28/22 1125   Wound 01/24/22 Hand Left;Dorsal   Date First Assessed/Time First Assessed: 01/24/22 0830   Present on Hospital Admission: Yes  Primary Wound Type: Burn  Location: Hand  Wound Location Orientation: Left;Dorsal   Wound Image    Wound Etiology Burn   Dressing Status New dressing applied   Wound Cleansed Cleansed with saline   Dressing/Treatment Triad hydro/zinc oxide-based hydrophilic paste;Xeroform   Dressing Change Due 03/01/22   Wound Length (cm) 3.6 cm   Wound Width (cm) 3 cm   Wound Depth (cm) 0.1 cm   Wound Surface Area (cm^2) 10.8 cm^2   Change in Wound Size % (l*w) 66.04   Wound Volume (cm^3) 1.08 cm^3   Wound Healing % 97   Wound Assessment Granulation tissue;Pink/red   Drainage Amount Scant   Drainage Description Serosanguinous   Shanita-wound Assessment   (maturation tissue)   Wound 01/24/22 Foot Left;Dorsal I & D to left dorsal foot by Dr. Gold Fontaine, surgical wound   Date First Assessed/Time First Assessed: 01/24/22 0830   Primary Wound Type: Incision  Location: Foot  Wound Location Orientation: Left;Dorsal  Wound Description (Comments): I & D to left dorsal foot by Dr. Gold Fontaine, surgical wound   Wound Image    Wound Etiology Surgical   Dressing Status New dressing applied   Wound Cleansed Cleansed with saline   Dressing Change Due 03/02/22   Post-Procedure Length (cm) 3.6 cm   Post-Procedure Width (cm) 6.2 cm   Post-Procedure Depth (cm) 2 cm   Post-Procedure Surface Area (cm^2) 22.32 cm^2   Post-Procedure Volume (cm^3) 44.64 cm^3   Distance Tunneling (cm) 1.6 cm   Tunneling Position ___ O'Clock 2   Wound Assessment Granulation tissue; Hyper granulation tissue  (bone and tendon exposed)   Drainage Amount Scant   Drainage Description Sanguinous; Serous   Odor None   Shanita-wound Assessment Intact   Margins Attached edges; Defined edges; Unattached edges   Wound Thickness Description not for Pressure Injury Full thickness   Wound 01/30/22 Sacrum SDTI   Date First Assessed/Time First Assessed: 01/30/22 7297 tissue; Hyper granulation tissue; Red  (exposed moist viable tendons and grayish bone)   Odor None

## 2022-02-28 NOTE — PROGRESS NOTES
Spoke with NP of surgery, Gita, at bedside. She gave verbal orders for Mr. Osei to have ice chips. She also says the plan is to do an upper GI with dye on Thursday. TF may start tomorrow (through J-tube) after she sees him so we will consult dietitian today. She also wants NG tube to wall suction at 80 suction.

## 2022-02-28 NOTE — PROGRESS NOTES
PROGRESS NOTE  S:46 yrs Patient  admitted on 1/22/2022 with Hyperglycemia [R73.9]  RODRICK (acute kidney injury) ([address] [N17.9]  Acute renal failure, unspecified acute renal failure type ([address] [N17.9]  Syncope, unspecified syncope type [R55] . Today he complains of abdominal pain and cramping at surgical sites. He is passing black BMs per [employer]. He received 11U PRBCs thus far this admission. Exam:   Vitals:    02/28/22 1100   BP: 109/67   Pulse: 106   Resp: 10   Temp:    SpO2: 99%      General appearance: alert, appears stated age, cooperative, fatigued and syndromic appearance - chronically ill appearing  HEENT: Neck supple with midline trachea  Neck: no adenopathy and supple, symmetrical, trachea midline  Lungs: clear to auscultation bilaterally  Heart: regular rate and rhythm, S1, S2 normal, no murmur, click, rub or gallop  Abdomen: normal findings: no masses palpable and symmetric and abnormal findings:  distended, hypoactive bowel sounds, obese, tenderness mild in the entire abdomen and surgical sites clean, bandaged, and dry  Extremities: extremities normal, atraumatic, no cyanosis or edema     Medications: Reviewed    Labs:  CBC:   Recent Labs     02/27/22  0215 02/27/22  0743 02/27/22 1645 02/27/22  1645 02/27/22 2035 02/28/22  0310 02/28/22  0810   WBC 13.9*  --  21.2*  --   --  18.9*  --    HGB 7.6*   < > 8.3*   < > 8.0* 7.2* 6.5*   HCT 22.2*   < > 25.1*   < > 23.6* 21.3* 19.0*   MCV 87.7  --  89.2  --   --  88.8  --      --  219  --   --  210  --     < > = values in this interval not displayed. BMP:   Recent Labs     02/26/22  0407 02/26/22  0407 02/27/22  0215 02/27/22  1645 02/28/22  0310   *   < > 129* 135* 141   K 4.9   < > 5.4* 5.8* 5.1      < > 97* 104 101   CO2 23   < > 20* 17* 20*   PHOS 6.2*  --  6.9*  --  6.8*   BUN 62*   < > 64* 68* 44*   CREATININE 4.7*   < > 5.4* 5.3* 3.8*    < > = values in this interval not displayed. LIVER PROFILE: No results for input(s): AST, ALT, LIPASE, PROT, BILIDIR, BILITOT, ALKPHOS in the last 72 hours. Invalid input(s): AMYLASE,  ALB  PT/INR:   Recent Labs     02/26/22  1540   INR 1.18*       Date of Surgery:          2/27/22  Pre-op Dx:                    Bleeding Duodenal Ulcer  Postop Dx:                  Same  Procedure:                  Exploratory Laparotomy, Oversew of Duodenal Ulcer, Pyloroplasty, Feeding Jejunostomy  Surgeon:                     Choco Jean Baptiste      Procedure:                 Esophagogastroduodenoscopy --diagnostic  Date:                           2/27/2022   Endoscopist:             Chris Haro MD   Pre-operative Diagnosis:   42year old male with a history of HTN, DM, A fib, and nonischemic cardiomyopathy admitted with septic shock secondary to MSSA bacteremia and L foot abscess complicated by acute respiratory failure and RODRICK. Hospitalized c/b cardiorespiratory arrest, RODRICK requiring HD and GI bleed secondary to large DU s/p IR guided coil embolization of GDA. Persistent bleeding requiring several PRBC transfusions noted. Post-operative Diagnosis:  Active duodenal bleeding    Attending Supervising [de-identified] Attestation Statement  The patient is a 42 y.o. male. I have performed a history and physical examination of the patient. I discussed the case with my physician assistant Teresa Diaz PA-C    I reviewed the patient's Past Medical History, Past Surgical History, Medications, and Allergies.      Physical Exam:  Vitals:    02/28/22 1500 02/28/22 1600 02/28/22 1700 02/28/22 1800   BP: 116/73 109/75 113/75 126/72   Pulse: 113 106 106 105   Resp: 17 15 14 17   Temp: 99 °F (37.2 °C)      TempSrc: Bladder      SpO2: 98% 100% 99% 98%   Weight:       Height:           Physical Examination: General appearance - ill-appearing  Mental status - alert, oriented to person, place, and time  Eyes - pupils equal and reactive, extraocular eye movements intact  Neck - supple, no significant adenopathy  Chest - clear to auscultation, no wheezes, rales or rhonchi, symmetric air entry  Heart - normal rate, regular rhythm, normal S1, S2, no murmurs, rubs, clicks or gallops  Abdomen - soft, nontender, nondistended, no masses or organomegaly  Extremities - no pedal edema noted        Impression: 42year old male with a history of HTN, DM, A fib, and nonischemic cardiomyopathy admitted with septic shock secondary to MSSA bacteremia and L foot abscess complicated by acute respiratory failure and RODRICK. Hospitalization c/b cardiorespiratory arrest, RODRICK requiring HD and GI bleed secondary to large DU s/p IR guided coil embolization of GDA. Repeat EGD yesterday showed active duodenal bleeding s/p oversew of duodenal ulcer POD #1. Recommendation:  1. Continue supportive care  2. Monitor Hgb  3. Observe for signs of bleeding  4. Monitor and document output   5. Transfuse as needed if Hgb < 7.0  6. Continue PPI gtt  7. General surgery, ID, pulmonology, and wound care following  8. Continue HD per nephrology  9. Will follow       Mellisa Curran PA-C  12:13 PM 2/28/2022                      42year old male with a history of HTN, DM, A fib, and nonischemic cardiomyopathy admitted with septic shock secondary to MSSA bacteremia and L foot abscess complicated by acute respiratory failure and RODRICK. Hospitalized c/b cardiorespiratory arrest, RODRICK requiring HD and GI bleed secondary to large DU s/p failed IR guided coil embolization of GDA s/p ex lap, oversew of duodenal ulcer and pylorplasty and J tube insertion     Continue supportive care. PPI drip x 48h. Monitor Hgb. Observe for signs of bleeding. Diet per surgery team. Transfuse pRBC to keep Hgb >7.  Will follow    Roby Springer MD          (O) [phone]  Loreta Sloan

## 2022-02-28 NOTE — PROGRESS NOTES
Pt a/o. VSS. Assessment complete as charted. Repositioned for comfort. Call light in reach. Denies needs. Will continue to monitor. Pt has Right neck Vas cath and right PICC dressing dry and intact. Munoz with stat lock and flexi seal in place.
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
## 2022-03-01 NOTE — PROGRESS NOTES
Initial assessment complete, see flowsheets. PRN given for pain, see MAR. Tube feed still not on unit, unable to initiate. Dressings CDI. MAX intact and draining. Pt fed ice chips. Denies other needs or discomforts at this time. Repositioned. Call light in easy reach, bedside table in easy reach, and bed in lowest position.   Mi Hong, RN
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
## 2022-03-01 NOTE — PROGRESS NOTES
Nephrology Progress Note   [employer]      Sub/interval history  Resting    HD on 3/1 w net 1.7l UF w 1 PRBC transfused   HD on 2/27 with net UF +200 ml , post wt 105.5 kg     Last 24 h uop 700 ml     ROS: No chest pain/shortness of breath/fever/nausea/vomiting  PSFH: + visitor    Scheduled Meds:   pantoprazole  40 mg IntraVENous BID    metoprolol  5 mg IntraVENous Q6H    ampicillin-sulbactam  3,000 mg IntraVENous 2 times per day    epoetin angeles-epbx  10,000 Units IntraVENous Once per day on Tue Thu Sat    [Held by provider] sucralfate  1 g Oral 4x Daily AC & HS    [Held by provider] dilTIAZem  30 mg Oral 4 times per day    [Held by provider] b complex-C-folic acid  1 capsule Oral Daily    [Held by provider] carvedilol  12.5 mg Oral BID WC    insulin lispro  0-18 Units SubCUTAneous Q4H    povidone-iodine   Topical Daily    [Held by provider] heparin (porcine)  5,000 Units SubCUTAneous 3 times per day    sodium chloride flush  5-40 mL IntraVENous 2 times per day     Continuous Infusions:   sodium chloride      sodium chloride      sodium chloride      sodium chloride 25 mL (02/27/22 0548)    dextrose       PRN Meds:.sodium chloride, sodium chloride, HYDROmorphone, ondansetron, oxyCODONE-acetaminophen, heparin (porcine), sodium chloride, albumin human, heparin (porcine), sodium chloride flush, sodium chloride, acetaminophen **OR** acetaminophen, glucose, glucagon (rDNA), dextrose, dextrose bolus (hypoglycemia) **OR** dextrose bolus (hypoglycemia)    Objective/     Vitals:    03/01/22 1200 03/01/22 1300 03/01/22 1400 03/01/22 1500   BP: 131/87 119/81 126/83 133/83   Pulse: 104 103 104 103   Resp: 11 15 16 16   Temp:       TempSrc:       SpO2: 99% 99% 98% 98%   Weight:       Height:         24HR INTAKE/OUTPUT:      Intake/Output Summary (Last 24 hours) at 3/1/2022 1546  Last data filed at 3/1/2022 1115  Gross per 24 hour   Intake 750 ml   Output 3150 ml   Net -2400 ml     Gen: alert, awake  Neck: No JVD  Skin: Unremarkable  Cardiovascular:  S1, S2 without m/r/g   Respiratory: decreased BS  Abdomen:  Soft,drains in situ   Extremities: trace lower extremity edema  Neuro/Psy: AAoriented times 3 ; moves all 4 ext    Data/  Recent Labs     02/27/22  1645 02/27/22 2035 02/28/22  0310 02/28/22  0810 02/28/22  1543 02/28/22  2352 03/01/22  0442   WBC 21.2*  --  18.9*  --   --   --  12.4*   HGB 8.3*   < > 7.2*   < > 7.6* 7.4* 7.1*   HCT 25.1*   < > 21.3*   < > 22.4* 22.0* 21.7*   MCV 89.2  --  88.8  --   --   --  91.4     --  210  --   --   --  202    < > = values in this interval not displayed. Recent Labs     02/27/22 0215 02/27/22 0215 02/27/22 1645 02/28/22 0310 03/01/22  0442   *   < > 135* 141 140   K 5.4*   < > 5.8* 5.1 4.5   CL 97*   < > 104 101 102   CO2 20*   < > 17* 20* 19*   GLUCOSE 138*   < > 186* 117* 75   PHOS 6.9*  --   --  6.8* 8.1*   BUN 64*   < > 68* 44* 53*   CREATININE 5.4*   < > 5.3* 3.8* 4.5*   LABGLOM 12*   < > 12* 17* 14*   GFRAA 14*   < > 14* 21* 17*    < > = values in this interval not displayed.        Assessment/     -RODRICK likely related to prerenal factors in the setting of sepsis, use of diuretics and losartan contributing to multifactorial ATN. Tharon Fisher is not suggestive of staph associated glomerulonephritis.  Patient did not respond to IV fluids and developed acute pulmonary edema and renal replacement therapy initiated on 1/23/22        Now had dialysis dependence x 4 weeks, making some urine but limited recovery         On TTS schedule but missed on Saturday due to other medical issues        -Acute pulmonary edema            Resolved with dialysis / now looks euvolemic      -S/P Cardio respiratory arrest             Prolonged hospital stay       -Sepsis with MSSA bacteremia with left foot abscess s/p drainage, osteomyelitis, left hand I&D            Low-grade fever on 2/19     -Anemia - prn prbc's     1 on 2/28 and 1 on 3/1    -Diabetes, uncontrolled     -Hypertension     -Weakness:  Prolonged ICU stay following MSSA bacteremia complicated by cardiac arrest.  Now with critical illness myopathy and likely neuropathy      -Anemia:  Acute GI bleed. s/p IR embolization of the gastroduodenal artery  On 2/25/22. CT angiogram with no active bleeding. OR w exp lap and oversew of DU, pyloroplasty on 2/27/22   S/p multiple units of blood transfusion          Plan/      HD per TTS  Schedule  Serial labs       Johnnie Jones MD  Office: [phone]  Fax:    [address] [facility]

## 2022-03-01 NOTE — PROGRESS NOTES
PROGRESS NOTE  S:46 yrs Patient  admitted on 1/22/2022 with Hyperglycemia [R73.9]  RODRICK (acute kidney injury) ([facility] [address] [N17.9]  Acute renal failure, unspecified acute renal failure type ([facility] [address] [N17.9]  Syncope, unspecified syncope type [R55] . Today he complains of fatigue and abdominal pain at surgical sites. He is passing dark BMs per [employer].     Exam:   Vitals:    03/01/22 1030   BP: (!) 116/92   Pulse: 109   Resp: 14   Temp: 98.5 °F (36.9 °C)   SpO2: 99%      General appearance: alert, appears stated age, cooperative, fatigued, pale and syndromic appearance - chronically ill appearing  HEENT: Neck supple with midline trachea  Neck: no adenopathy and supple, symmetrical, trachea midline  Lungs: clear to auscultation bilaterally  Heart: regular rate and rhythm, S1, S2 normal, no murmur, click, rub or gallop  Abdomen: normal findings: no masses palpable and symmetric, abnormal findings:  distended, hypoactive bowel sounds, obese and tenderness moderate at surgical sites and surgical sites clean, bandaged, and dry  Extremities: extremities normal, atraumatic, no cyanosis or edema     Medications: Reviewed    Labs:  CBC:   Recent Labs     02/27/22 1645 02/27/22 2035 02/28/22  0310 02/28/22  0810 02/28/22  1543 02/28/22  2352 03/01/22  0442   WBC 21.2*  --  18.9*  --   --   --  12.4*   HGB 8.3*   < > 7.2*   < > 7.6* 7.4* 7.1*   HCT 25.1*   < > 21.3*   < > 22.4* 22.0* 21.7*   MCV 89.2  --  88.8  --   --   --  91.4     --  210  --   --   --  202    < > = values in this interval not displayed. BMP:   Recent Labs     02/27/22  0215 02/27/22  0215 02/27/22  1645 02/28/22  0310 03/01/22  0442   *   < > 135* 141 140   K 5.4*   < > 5.8* 5.1 4.5   CL 97*   < > 104 101 102   CO2 20*   < > 17* 20* 19*   PHOS 6.9*  --   --  6.8* 8.1*   BUN 64*   < > 68* 44* 53*   CREATININE 5.4*   < > 5.3* 3.8* 4.5*    < > = values in this interval not displayed.      LIVER PROFILE: No results for input(s): AST, ALT, LIPASE, PROT, BILIDIR, BILITOT, ALKPHOS in the last 72 hours. Invalid input(s): AMYLASE,  ALB  PT/INR:   Recent Labs     02/26/22  1540   INR 1.18*     Attending Supervising [de-identified] Attestation Statement  The patient is a 42 y.o. male. I have performed a history and physical examination of the patient. I discussed the case with my physician assistant Leonie Lewis PA-C    I reviewed the patient's Past Medical History, Past Surgical History, Medications, and Allergies. Physical Exam:  Vitals:    03/01/22 1500 03/01/22 1600 03/01/22 1700 03/01/22 1800   BP: 133/83 129/77 104/83 136/81   Pulse: 103 102 108 107   Resp: 16 15 18 15   Temp:  99 °F (37.2 °C)     TempSrc:  Bladder     SpO2: 98% 98% 99% 99%   Weight:       Height:           Physical Examination: General appearance - chronically ill appearing  Mental status - alert, oriented to person, place, and time  Eyes - pupils equal and reactive, extraocular eye movements intact  Neck - supple, no significant adenopathy  Chest - clear to auscultation, no wheezes, rales or rhonchi, symmetric air entry  Heart - normal rate, regular rhythm, normal S1, S2, no murmurs, rubs, clicks or gallops  Abdomen - soft, nontender, nondistended, no masses or organomegaly  Extremities - no pedal edema noted          Impression: 42year old male with a history of HTN, DM, A fib, and nonischemic cardiomyopathy admitted with septic shock secondary to MSSA bacteremia and L foot abscess complicated by acute respiratory failure and RODRICK. Hospitalization c/b cardiorespiratory arrest, RODRICK requiring HD and GI bleed secondary to large DU s/p failed IR guided coil embolization of GDA. Repeat EGD showed active duodenal bleeding s/p ex lap, oversew of duodenal ulcer and pyloroplasty, and J tube insertion POD #2. Recommendation:  1. Continue supportive care  2. Monitor Hgb  3. Observe for signs of bleeding  4.  Monitor and document output 5. Transfuse as needed if Hgb < 7.0  6. Continue PPI gtt x 24 more hours   7. Diet per surgery team  8. ID, pulmonology, and wound care following  9. Continue HD per nephrology  10. Will follow       Sofia Fregoso PA-C  10:42 AM 3/1/2022                      42year old male with a history of HTN, DM, A fib, and nonischemic cardiomyopathy admitted with septic shock secondary to MSSA bacteremia and L foot abscess complicated by acute respiratory failure and RODRICK. Hospitalized c/b cardiorespiratory arrest, RODRICK requiring HD and GI bleed secondary to large DU s/p failed IR guided coil embolization of GDA s/p ex lap, oversew of duodenal ulcer and pylorplasty and J tube insertion POD#3     Continue supportive care. PPI drip x 24h. Monitor Hgb. Observe for signs of bleeding. Diet per surgery team. Transfuse pRBC to keep Hgb >7. HD per Nephrology.  PT/OT    Judith Delvalle MD          (O) [phone]

## 2022-03-01 NOTE — PROGRESS NOTES
AM assessment done. Dialysis nurse @ the bedside. Pt given pain meds. He is w/out evidence of distress @ this time.

## 2022-03-01 NOTE — PROGRESS NOTES
Pulmonary & Critical Care Medicine ICU Progress Note    CC: Septic shock, MSSA bacteremia, left foot abscess    Events of Last 24 hours:    2 additional unit PRBC yesterday  HD  Better urine output    Vascular lines:    2/7/2022 RUE PICC  2/11/2022 right IJ HD cath    MV:   1/23/22 - 2/5/22; extubated and re-intubated due to unable to clear secretions/hypoxia on 2/5/22 after less than 12 hours  2/5-2/9/22; reintubated 2/13 for svt and resp distress with secretions  2/13/22 - 2/17/22      Intake/Output Summary (Last 24 hours) at 3/1/2022 0835  Last data filed at 3/1/2022 0442  Gross per 24 hour   Intake 350 ml   Output 1480 ml   Net -1130 ml       /78   Pulse 103   Temp 98.8 °F (37.1 °C)   Resp 15   Ht 6' 1\" (1.854 m)   Wt 242 lb 8.1 oz (110 kg)   SpO2 98%   BMI 31.99 kg/m²  RA  Constitutional:  No acute distress. HENT:  Oropharynx is clear and moist.   Neck: No tracheal deviation present. Cardiovascular: Normal heart sounds. No lower extremity edema. Pulmonary/Chest: No wheezes. No rhonchi. No rales. Normal breath sounds. No accessory muscle usage or stridor. Musculoskeletal: No cyanosis. No clubbing. Skin: Skin is warm and dry. Psychiatric: Normal mood and affect.   Neurologic: speech fluent, alert and oriented, strength symmetric         Scheduled Meds:   metoprolol  5 mg IntraVENous Q6H    insulin glargine  10 Units SubCUTAneous QAM    ampicillin-sulbactam  3,000 mg IntraVENous 2 times per day    epoetin angeles-epbx  10,000 Units IntraVENous Once per day on Tue Thu Sat    [Held by provider] sucralfate  1 g Oral 4x Daily AC & HS    [Held by provider] dilTIAZem  30 mg Oral 4 times per day    [Held by provider] b complex-C-folic acid  1 capsule Oral Daily    [Held by provider] carvedilol  12.5 mg Oral BID WC    insulin lispro  0-18 Units SubCUTAneous Q4H    povidone-iodine   Topical Daily    [Held by provider] heparin (porcine)  5,000 Units SubCUTAneous 3 times per day    sodium chloride flush  5-40 mL IntraVENous 2 times per day       Data:  CBC:   Recent Labs     02/27/22  1645 02/27/22  2035 02/28/22  0310 02/28/22  0810 02/28/22  1543 02/28/22  2352 03/01/22  0442   WBC 21.2*  --  18.9*  --   --   --  12.4*   HGB 8.3*   < > 7.2*   < > 7.6* 7.4* 7.1*   HCT 25.1*   < > 21.3*   < > 22.4* 22.0* 21.7*   MCV 89.2  --  88.8  --   --   --  91.4     --  210  --   --   --  202    < > = values in this interval not displayed. BMP:   Recent Labs     02/27/22  0215 02/27/22  0215 02/27/22  1645 02/28/22  0310 03/01/22  0442   *   < > 135* 141 140   K 5.4*   < > 5.8* 5.1 4.5   CL 97*   < > 104 101 102   CO2 20*   < > 17* 20* 19*   PHOS 6.9*  --   --  6.8* 8.1*   BUN 64*   < > 68* 44* 53*   CREATININE 5.4*   < > 5.3* 3.8* 4.5*    < > = values in this interval not displayed. LIVER PROFILE:   No results for input(s): AST, ALT, LIPASE, BILIDIR, BILITOT, ALKPHOS in the last 72 hours. Invalid input(s): AMYLASE,  ALB    Microbiology:  1/22 [facility] MSSA  1/22 COVID-19 and influenza negative  1/23/22 Blood cx: NGTD  1/23 tracheal aspirate MSSA  1/24 Wound cx: MSSA  1/24 BC MSSA  1/29/22 BAL pending  1/30/2022 blood sent  1/31/2022 left hand Enterococcus and staph aureus  2/1/2022 bone MSSA  2/5/2022 surgical hand culture E. Faecalis  2/13 BAL NRF   2/13 BC NG    Imaging:   CT chest 2/24/2022  Cavitary right upper lobe pneumonia    Echo 1/25/22:   EF 35%, global HK, reduced RV function    ACT 2/26/22  There is a curvilinear area of arterial phase enhancement along the posterolateral proximal duodenal wall (images 70 1-76) as well as pooling   of contrast material in this location and slightly cranial to it on delayed phase images (image 70-72), findings which raise concern for residual or recurrent bleed within the location of the proximal duodenum.  This is in proximity to the metallic gastroduodenal artery embolization coils.  No gastric or duodenal ulcer or wall thickening is apparent. ASSESSMENT:  · Acute hypoxemic respiratory failure - recurrent and has been intubated 3 times  · VAP LLL   · Abnormal CXR with R mid lung cavitary lesion - likely septic emboli from recent bacteremia  · Acute GIB - EGD 2/17 3.5 cm gastric and duodenal bulb ulcer  · S/P GDA embolization 2/25/22  · Probable recurrent proximal duodenal bleed on 2/26/22 CTA  · Post-op oversew of duodenal ulcer, pyloroplasty and feeding jejunostomy 2/27/22  · Cardiopulmonary arrest x 2 1/23/2022, required CPR, TTM - rewarmed 8 pm 1/25/22  · Acute kidney failure  · MSSA bacteremia  · DM with hyperglycemia   · L foot abscess drained 1/24/2022, MSSA; MRI with large fluid collection and changes c/w osteomyelitis, s/p debridement by Dr. Trevor Christine on 2/1/22  · L hand burn with deep tissue injury & abscess, s/p debridement on 2/5/22  · Paroxysmal AFIB/flutter with RVR  · Cardiomyopathy EF 35% on Echo 1/24/22     PLAN:  Continue Unasyn per ID   Nephrology following for HD  SLP following, can resume TF per GI   Hold Lantus while NPO  Percocet PRN for pain   Protonix gtt per Gastroenterology    S/P 3 U PRBC prior to 2/18/22; 1 U PRBC 2/24/22; 4 U PRBC 2/25/22; 2 U PRBC 2/26/22, 2 U 2/27/22, 2 U 2/28/22, 1 U 3/1/22  SCDs  Okay for PCU

## 2022-03-01 NOTE — PROGRESS NOTES
Occupational/Physical Therapy  Attempted to see patient this pm.  Patient POD #2 ex lap, oversew of duodenal ulcer, pyloroplasty, J tube insertion. Per surgery note 2/28/22, current activity is strict bedrest with HOB flat-to-30 degrees X 24 hours, then check with Sx. Will need new orders to resume therapy services when appropriate.     Sabina Vargas, OTR/L [id number]  [facility], MS PT, # [id number]

## 2022-03-01 NOTE — PROGRESS NOTES
HD nurse requested this writer verify need for PRBC transfusion during HD. Dr. Bob Blancas regarding situation. Awaiting response.  Blanca Plummer RN

## 2022-03-01 NOTE — FLOWSHEET NOTE
Treatment time: 3 hours  Net UF: 1700 ml    Pre weight: 110 kg   Post weight: 480186/86 kg  EDW: TBD kg    Access used: Right IJ  Access function: Good with  ml/min    Medications or blood products given: Retacrit, 1 unit of PRBC    Regular outpatient schedule: T.T.S    Summary of response to treatment: Pt tolerated well with HD, sBP >100, HR<120 during HD. HD completed in full, heparin dwell in NTDC, capped and clamped/    Cirt Line: Initial Hct: 21.8;   End Profile : A; Refill ( Hct.1 -27.9  subtract Hct 2- 27.8): 0.1 (no Refill);   BV: -21.5 % not accurate due to blood transfusion       Copy of dialysis treatment record placed in chart, to be scanned into EMR.     03/01/22 0800 03/01/22 1115   Vital Signs   /78 119/86   Temp 98.8 °F (37.1 °C) 98.5 °F (36.9 °C)   Pulse 103 108   Resp 15 14   SpO2 98 % 99 %   Weight 242 lb 8.1 oz (110 kg)  --    Weight Method Bed scale  --    Percent Weight Change -0.36  --    Pain Assessment   Pain Level  --  0   Post-Hemodialysis Assessment   Post-Treatment Procedures  --  Blood returned;Catheter capped, clamped and heparinized x 2 ports   Machine Disinfection Process  --  Acid/Vinegar Clean;Bleach; Exterior Machine Disinfection   Rinseback Volume (ml)  --  400 ml   Total Liters Processed (l/min)  --  56.9 l/min   Dialyzer Clearance  --  Moderately streaked   Duration of Treatment (minutes)  --  180 minutes   Heparin amount administered during treatment (units)  --  0 units   Hemodialysis Intake (ml)  --  750 ml   Hemodialysis Output (ml)  --  2450 ml   NET Removed (ml)  --  1700 ml   Tolerated Treatment  --  Good

## 2022-03-01 NOTE — PROGRESS NOTES
[facility] Infectious Disease Progress Note      Teri Sandoval     : 1975    DATE OF VISIT:  3/1/2022  DATE OF ADMISSION:  2022       Subjective:     Teri Sandoval is a 42 y.o. male whom I've been seeing for a deep left hand infection and deep left foot infection. Since I last saw him, he's doing ok overall. Getting HD now. Has some abd pain, 7/10. Doesn't feel short of breath, mild cough, not on O2, no nausea, no F/C/D. Very weak and fatigued of course. Mr. Manjula Nunez has a past medical history of Abscess of left foot, Abscess of left hand, Acute encephalopathy, Acute hypoxemic respiratory failure ([address] Acute osteomyelitis of right hallux ([address] Cellulitis of left foot, CHF (congestive heart failure) ([address] Chronic osteomyelitis of left foot ([address] Closed displaced fracture of distal phalanx of right little finger, Clostridium difficile infection, Diabetic ulcer of left forefoot associated with type 2 diabetes mellitus, with necrosis of bone ([address] Diabetic ulcer of right great toe associated with type 2 diabetes mellitus, with necrosis of bone ([address] Failed soft 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 toe amputation site, History of hyperbaric oxygen therapy, Infective tenosynovitis of extensor tendons of left hand, Migraine, Possible perforated tympanic membrane, Post-op hematoma of left foot, Recurrent otitis media, Septic shock (HCC), Syncope, Tear of medial meniscus of left knee, Tobacco use, Toe osteomyelitis, left ([address] and VAP (ventilator-associated pneumonia) ([address]     Current Facility-Administered Medications: 0.9 % sodium chloride infusion, , IntraVENous, PRN  0.9 % sodium chloride infusion, , IntraVENous, PRN  metoprolol (LOPRESSOR) injection 5 mg, 5 mg, IntraVENous, Q6H  HYDROmorphone (DILAUDID) injection 1 mg, 1 mg, IntraVENous, Q2H PRN  pantoprazole (PROTONIX) 80 mg in sodium chloride 0.9 % 100 mL infusion, 8 mg/hr, IntraVENous, Continuous  ondansetron (ZOFRAN) injection 4 mg, 4 mg, IntraVENous, Q6H PRN  ampicillin-sulbactam (UNASYN) 3000 mg ivpb minibag, 3,000 mg, IntraVENous, 2 times per day  oxyCODONE-acetaminophen (PERCOCET) 5-325 MG per tablet 1 tablet, 1 tablet, Oral, Q4H PRN  epoetin angeles-epbx (RETACRIT) injection 10,000 Units, 10,000 Units, IntraVENous, Once per day on Tue Thu Sat  [Held by provider] sucralfate (CARAFATE) tablet 1 g, 1 g, Oral, 4x Daily AC & HS  [Held by provider] dilTIAZem (CARDIZEM) tablet 30 mg, 30 mg, Oral, 4 times per day  [Held by provider] b complex-C-folic acid (NEPHROCAPS) capsule 1 mg, 1 capsule, Oral, Daily  [Held by provider] carvedilol (COREG) tablet 12.5 mg, 12.5 mg, Oral, BID WC  insulin lispro (HUMALOG) injection vial 0-18 Units, 0-18 Units, SubCUTAneous, Q4H  povidone-iodine (BETADINE) 10 % external solution, , Topical, Daily  [Held by provider] heparin (porcine) injection 5,000 Units, 5,000 Units, SubCUTAneous, 3 times per day  heparin (porcine) injection 3,000 Units, 3,000 Units, IntraVENous, PRN  sodium chloride 0.9 % irrigation 1,000 mL, 1,000 mL, Irrigation, Continuous PRN  albumin human 25 % IV solution 12.5 g, 12.5 g, IntraVENous, PRN  heparin (porcine) injection 2,600 Units, 2,600 Units, IntraCATHeter, PRN  sodium chloride flush 0.9 % injection 5-40 mL, 5-40 mL, IntraVENous, 2 times per day  sodium chloride flush 0.9 % injection 5-40 mL, 5-40 mL, IntraVENous, PRN  0.9 % sodium chloride infusion, 25 mL, IntraVENous, PRN  acetaminophen (TYLENOL) tablet 650 mg, 650 mg, Oral, Q6H PRN **OR** acetaminophen (TYLENOL) suppository 650 mg, 650 mg, Rectal, Q6H PRN  glucose (GLUTOSE) 40 % oral gel 15 g, 15 g, Oral, PRN  glucagon (rDNA) injection 1 mg, 1 mg, IntraMUSCular, PRN  dextrose 5 % solution, 100 mL/hr, IntraVENous, PRN  dextrose bolus (hypoglycemia) 10% 125 mL, 125 mL, IntraVENous, PRN **OR** dextrose bolus (hypoglycemia) 10% 250 mL, 250 mL, IntraVENous, PRN     This is day 27 of Enterococcal therapy, day 38 of MSSA therapy, POD 28 for the foot, POD 24 for the hand. Allergies: Januvia [sitagliptin], Metformin and related, Vancomycin, and Mustard oil [allyl isothiocyanate]    Pertinent items from the review of systems are discussed in the HPI; the remainder of the ROS was reviewed and is negative.      Objective:     Vital signs over the last 24 hours:  Temp  Av.8 °F (37.1 °C)  Min: 98.4 °F (36.9 °C)  Max: 99.5 °F (37.5 °C)  Pulse  Av.4  Min: 99  Max: 111  Systolic (75OWF), PUI:520 , Min:105 , KBY:070   Diastolic (57HPI), XKX:72, Min:67, Max:89  Resp  Av.8  Min: 10  Max: 17  SpO2  Av %  Min: 96 %  Max: 100 %    Constitutional:  well-developed, well-nourished, weak and fatigued, in some pain, does not look toxic  Neuropsych: a bit somnolent today (after analgesics), but interactive, not anxious or agitated  Eyes:  pupils equal, round, reactive to light; sclerae anicteric, conjunctivae pale  ENT:  atraumatic; no labial ulcers; no thrush; NGT in place; mucous membranes relatively moist  Resp:  decreased at the bases, clearer overall  Cardiovascular:  heart regular, tachy, no murmur; generally mild extremity edema; right PICC in place, and a right IJ HD line, exit sites benign, maybe increasing RUE edema  GI:  abdomen soft, expected degree of tenderness, less distended, hypoactive bowel sounds, no palpable masses or organomegaly; rectal tube in place, with ongoing diarrhea; on the abdomen, surgical site covered, right sided MAX drain with serosanguinous fluid, left sided J-tube in place  : Munoz in place, increasing amount of clearer yellow urine now  Musculoskeletal:  no clubbing, cyanosis or petechiae; extremities with no gross effusions or acute arthritis  Skin: warm, dry, normal turgor; no acute rash. Wounds not examined today.   ______________________________    Recent Labs     22  0442 22  2352 22  1543 22  0810 220 22  1645   WBC 12.4*  --   --   --  18.9*  --  21.2*   HGB 7.1* 7. 4* 7.6*   < > 7.2*   < > 8.3*   HCT 21.7* 22.0* 22.4*   < > 21.3*   < > 25.1*   MCV 91.4  --   --   --  88.8  --  89.2     --   --   --  210  --  219    < > = values in this interval not displayed. Lab Results   Component Value Date    CREATININE 4.5 (H) 03/01/2022     Lab Results   Component Value Date    LABALBU 2.8 (L) 03/01/2022     Lab Results   Component Value Date    ALT <5 (L) 02/10/2022    AST 8 (L) 02/10/2022     (H) 01/31/2022    ALKPHOS 217 (H) 02/10/2022    BILITOT 0.5 02/10/2022      Lab Results   Component Value Date    LABA1C 10.6 01/23/2022     Other recent pertinent labs: Anion gap 19 (before HD today). ANC 10.4. Glucoses averaging around 100. cRP down to 53.7 despite the GI surgery. ______________________________    Recent pertinent micro results:  Nothing this past week.  ______________________________    Recent imaging results (last 7 days):     XR ABDOMEN (KUB) (SINGLE AP VIEW)    Result Date: 2/26/2022  Small amount of contrast in the colon, similar to CT of 02/25/2022. RECOMMENDATION: If upper gastrointestinal hemorrhage is suspected, proceed with CTA. If lower gastrointestinal hemorrhage is suspected, there would likely be limitation on CTA related to contrast in the colon; proceed with hesitation, preferably delay study. CT CHEST WO CONTRAST    Result Date: 2/24/2022  1. Right upper lobe cavitary airspace disease concerning for pneumonia. Recommend CT of the chest with contrast in 8 weeks to confirm resolution. 2. Nasogastric tube terminating in the proximal gastric body should be advanced approximately 5 cm.  3. Small left pleural effusion with atelectasis     CTA ABDOMEN W WO CONTRAST    Addendum Date: 2/27/2022    ADDENDUM: There is a curvilinear area of arterial phase enhancement along the posterolateral proximal duodenal wall (images 70 1-76) as well as pooling of contrast material in this location and slightly cranial to it on delayed phase images (image 70-72), findings which raise concern for residual or recurrent bleed within the location of the proximal duodenum. This is in proximity to the metallic gastroduodenal artery embolization coils. No gastric or duodenal ulcer or wall thickening is apparent. ADDENDUM: Three-dimensional image post processing was performed at a remote workstation. Addendum Date: 2/26/2022    ADDENDUM: There is a curvilinear area of arterial phase enhancement along the posterolateral proximal duodenal wall (images 70 1-76) as well as pooling of contrast material in this location and slightly cranial to it on delayed phase images (image 70-72), findings which raise concern for residual or recurrent bleed within the location of the proximal duodenum. This is in proximity to the metallic gastroduodenal artery embolization coils. No gastric or duodenal ulcer or wall thickening is apparent. Result Date: 2/27/2022  Wide patency of the mesenteric arteries and veins. No pseudoaneurysm, active bleed, or significant mesenteric artery occlusive disease. Interval coil embolization of the gastroduodenal artery. No acute or significant intestinal ischemia. Cholelithiasis. IR ANGIOGRAM SUPERIOR MESENTERIC    Result Date: 2/25/2022  No active extravasation or pseudoaneurysm formation. Successful coil embolization of the gastroduodenal artery. FL MODIFIED BARIUM SWALLOW W VIDEO    Result Date: 2/24/2022  Swallowing mechanism grossly within normal limits without evidence of aspiration. Please see separate speech pathology report for full discussion of findings and recommendations. CTA ABDOMEN PELVIS W WO CONTRAST    Result Date: 2/25/2022  Poor opacification of the abdominal aorta and its branches. No evidence of active GI bleed on this study, though study is limited secondary to presence of contrast and other radiopaque material within the large bowel.  RECOMMENDATIONS: Unavailable      Assessment:     Patient Active Problem List   Diagnosis Code    Hypertension I10    Uncontrolled type 2 diabetes mellitus with diabetic polyneuropathy ([facility]) E11.42, E11.65    Cardiomyopathy ([facility] I42.9    Mixed hyperlipidemia E78.2    Allergic rhinitis J30.9    Osteoarthritis M19.90    Acute osteomyelitis of left foot ([facility]) M86.172    Acute renal failure ([facility]) N17.9    MSSA bacteremia R78.81, B95.61    Third degree burn of left hand T23.302A    Cardiopulmonary arrest ([facility]) I46.9    Hypertriglyceridemia E78.1    Staphylococcal arthritis of left foot ([facility]) M00.072    Antibiotic-associated diarrhea K52.1, T36.95XA    Enterococcal infection A49.1    Acute osteomyelitis of left hand ([facility]) M86.142    Pressure injury of deep tissue of sacral region L89.156    Severe protein-calorie malnutrition ([facility]) E43    Paroxysmal atrial fibrillation ([facility]) I48.0    Upper GI bleeding K92.2    Duodenal ulcer K26.9    Diabetic ulcer of left midfoot associated with type 2 diabetes mellitus, with necrosis of bone ([facility]) E11.621, L97.424    Probable abscess of upper lobe of right lung without pneumonia ([facility]) J85.2    Gastrointestinal hemorrhage K92.2    Abnormal CXR R93.89     Assessment of today's active condition(s):      --          Background of uncontrolled DM2, neuropathy, no known PAD, multiple prior diabetic foot ulcers, infections, surgeries. Most recent wounds were at the left 1st and 2nd ray, but they had been healed for just about a year.      --          Admission this time with septic shock related primarily to deep MSSA foot infection (cellulitis, abscess, septic arthritis, acute osteo), with acute renal failure, lactic acidosis, then cardiac arrest, resuscitation, acute respiratory failure, rapid Afib. Shock resolved, respiratory failure resolved (I think perhaps mostly due to CHF / fluid overload after cardiac arrest).  Initial sepsis finally resolved, after aggressive OR treatment of left foot and left hand infections. Left foot wound overall doing better, but still some necrotic deep soft tissue and bone - I think a bit more work is needed, beyond what I can safely do at bedside.      --          Ongoing ARF, on intermittent HD, but U/O improving a bit more.      --          Third degree burn of left hand, with purulence beneath the lysing eschar seen to be running along extensor tendons - MRI and clinical exams c/w soft tissue abscess, septic tenosynovitis, possible acute septic arthritis at the 3rd MCPJ, with adjacent acute metacarpal and phalangeal osteo. Definitely need to treat the Enterococcus, with it isolated from OR Cxs. Much improved these last two weeks, pus gone, tunnels closed, just those two small foci of eschar and necrosis to deal with.     --          Resolved encephalopathy, likely multifactorial (cardiac arrest, ICU stay, sedatives, sepsis, renal failure, etc). Also wondering if he might not have a significant degree of critical-illness myopathy and/or neuropathy. Peripheral strength does seem better today than late last week, at least distal UE's.      --          Colonization with Candida, not surprising given DM, prior steroids, extensive Abx Rx.      --          Unstageable pressure ulcer of sacrum and scalp -- conservative Rx for now, with Triad - looking a bit better.      --          About 2-3 weeks ago a setback with difficulty in clearing secretions, hypoxemia, worsening mental status, rapid Afib, reintubation. I think this was more of an issue of retained secretions and perhaps aspiration pneumonitis, as opposed to a true invasive pneumonia.  Respiratory function seemed to improve quickly after bronch and supportive care, nothing clearly new on CXR, FIO2 has come down, WBC quickly back down to normal, and nothing on bronch wash / BAL. Now extubated, and looking better than he has recently. Off O2 now. Swallow eval to be done.     --          He does look to have a small RUL lung abscess on recent CXR and then CT scan. Could have been a septic embolic process that then cavitated, or could have been an abscess that resulted from that episode of probable aspiration a couple of weeks ago.      --          Persistent / recurrent anemia, heme (+), large DU found at EGD. Still requiring PRBCs at times. Failed to stop bleeding despite endoscopic and IR treatment, so now POD 1 from a DU oversew, pyloroplasty, J-tube placement.      --          Multifactorial diarrhea (antibiotics, tube feeds, GI bleed. ...). Negative for Cdiff a few weeks ago.      --          Likely a reactive leukocytosis and low-grade fever now, related to wounds, GI bleeding, perhaps the small lung abscess, areas of atelectasis, possible intermittent aspiration etc. Would just follow for now, watch clinically for signs of new nosocomial infection. The Unasyn would treat the lung abscess if Staph, and should also treat it if polymicrobial from recent aspiration, since only normal keith (and Candida) were recovered on bronch.     Treatment recs:     No change in Abx. Follow labs periodically; watch for thrush, Cdiff, line infection, allergic reaction, etc.      Will be stopping the Enterococcal therapy at the end of this week, and just focus on the MSSA for any residual foot infection; if that lung abscess did start at the time of that presumed aspiration event, he will have had 3 weeks of Unasyn for that, which seems reasonable.      Getting HD still. When renal function improves enough, will adjust Abx dose accordingly.     No change in wound care for right now, apart from a bit of Triad to those two small eschars near the main left hand wound.  I think he'll need to go to the OR to debride his left foot again, with some residual bone exposure -- perhaps late this week or next week, depending on how quickly he recovers from the GI surgery?     Swallow evaluation still needs to be done (when cleared by surgery and GI), and he'll need a lot of work with PT and OT.      Eventual repeat CT scan of the chest in a couple of months, hopefully to document clearance of that presumed abscess.     Electronically signed by Lucy Jones MD on 3/1/2022 at 8:59 AM.

## 2022-03-02 NOTE — PROGRESS NOTES
Pulmonary & Critical Care Medicine ICU Progress Note    CC: Septic shock, MSSA bacteremia, left foot abscess    Events of Last 24 hours:    Better urine output  Hemoglobin stable    Vascular lines:    2/7/2022 RUE PICC  2/11/2022 right IJ HD cath    MV:   1/23/22 - 2/5/22; extubated and re-intubated due to unable to clear secretions/hypoxia on 2/5/22 after less than 12 hours  2/5-2/9/22; reintubated 2/13 for svt and resp distress with secretions  2/13/22 - 2/17/22      Intake/Output Summary (Last 24 hours) at 3/2/2022 0718  Last data filed at 3/2/2022 0544  Gross per 24 hour   Intake 750 ml   Output 3650 ml   Net -2900 ml       /74   Pulse 90   Temp 99.2 °F (37.3 °C) (Bladder)   Resp 14   Ht 6' 1\" (1.854 m)   Wt 242 lb 8.1 oz (110 kg)   SpO2 100%   BMI 31.99 kg/m²  RA  Constitutional:  No acute distress. HENT:  Oropharynx is clear and moist.   Neck: No tracheal deviation present. Cardiovascular: Normal heart sounds. No lower extremity edema. Pulmonary/Chest: No wheezes. No rhonchi. No rales. Normal breath sounds. No accessory muscle usage or stridor. Musculoskeletal: No cyanosis. No clubbing. Skin: Skin is warm and dry.    Psychiatric: Depressed mood and affect  Neurologic: speech fluent, alert and oriented, strength symmetric         Scheduled Meds:   pantoprazole  40 mg IntraVENous BID    metoprolol  5 mg IntraVENous Q6H    ampicillin-sulbactam  3,000 mg IntraVENous 2 times per day    epoetin angeles-epbx  10,000 Units IntraVENous Once per day on Tue Thu Sat    [Held by provider] sucralfate  1 g Oral 4x Daily AC & HS    [Held by provider] dilTIAZem  30 mg Oral 4 times per day    [Held by provider] b complex-C-folic acid  1 capsule Oral Daily    [Held by provider] carvedilol  12.5 mg Oral BID WC    insulin lispro  0-18 Units SubCUTAneous Q4H    povidone-iodine   Topical Daily    [Held by provider] heparin (porcine)  5,000 Units SubCUTAneous 3 times per day    sodium chloride flush 5-40 mL IntraVENous 2 times per day       Data:  CBC:   Recent Labs     02/28/22  0310 02/28/22  0810 03/01/22  0442 03/01/22  0442 03/01/22  1700 03/02/22  0216 03/02/22  0543   WBC 18.9*  --  12.4*  --   --   --  9.1   HGB 7.2*   < > 7.1*   < > 8.1* 7.8* 7.8*   HCT 21.3*   < > 21.7*   < > 24.2* 23.3* 22.8*   MCV 88.8  --  91.4  --   --   --  89.3     --  202  --   --   --  185    < > = values in this interval not displayed. BMP:   Recent Labs     02/28/22 0310 03/01/22 0442 03/02/22  0543    140 140   K 5.1 4.5 4.2    102 101   CO2 20* 19* 20*   PHOS 6.8* 8.1* 5.5*   BUN 44* 53* 34*   CREATININE 3.8* 4.5* 3.2*     LIVER PROFILE:   No results for input(s): AST, ALT, LIPASE, BILIDIR, BILITOT, ALKPHOS in the last 72 hours. Invalid input(s): AMYLASE,  ALB    Microbiology:  1/22 [facility] MSSA  1/22 COVID-19 and influenza negative  1/23/22 Blood cx: NGTD  1/23 tracheal aspirate MSSA  1/24 Wound cx: MSSA  1/24 BC MSSA  1/29/22 BAL pending  1/30/2022 blood sent  1/31/2022 left hand Enterococcus and staph aureus  2/1/2022 bone MSSA  2/5/2022 surgical hand culture E. Faecalis  2/13 BAL NRF   2/13 BC NG    Imaging:   CT chest 2/24/2022  Cavitary right upper lobe pneumonia    Echo 1/25/22:   EF 35%, global HK, reduced RV function    ACT 2/26/22  There is a curvilinear area of arterial phase enhancement along the posterolateral proximal duodenal wall (images 70 1-76) as well as pooling   of contrast material in this location and slightly cranial to it on delayed phase images (image 70-72), findings which raise concern for residual or recurrent bleed within the location of the proximal duodenum.  This is in proximity to the metallic gastroduodenal artery embolization coils.  No gastric or duodenal ulcer or wall thickening is apparent.      ASSESSMENT:  · Acute hypoxemic respiratory failure - recurrent and has been intubated 3 times  · VAP LLL   · Abnormal CXR with R mid lung cavitary lesion - likely septic emboli from recent bacteremia  · Acute GIB - EGD 2/17 3.5 cm gastric and duodenal bulb ulcer  · S/P GDA embolization 2/25/22  · Probable recurrent proximal duodenal bleed on 2/26/22 CTA  · Post-op oversew of duodenal ulcer, pyloroplasty and feeding jejunostomy 2/27/22  · Cardiopulmonary arrest x 2 1/23/2022, required CPR, TTM - rewarmed 8 pm 1/25/22  · Acute kidney failure  · MSSA bacteremia  · DM with hyperglycemia   · L foot abscess drained 1/24/2022, MSSA; MRI with large fluid collection and changes c/w osteomyelitis, s/p debridement by Dr. Riggs Confer on 2/1/22  · L hand burn with deep tissue injury & abscess, s/p debridement on 2/5/22  · Paroxysmal AFIB/flutter with RVR  · Cardiomyopathy EF 35% on Echo 1/24/22     PLAN:  Continue Unasyn per ID   Nephrology following for HD  SLP following, can resume TF per GI   Hold Lantus while NPO  Percocet PRN for pain   Protonix gtt per Gastroenterology    S/P 3 U PRBC prior to 2/18/22; 1 U PRBC 2/24/22; 4 U PRBC 2/25/22; 2 U PRBC 2/26/22, 2 U 2/27/22, 2 U 2/28/22, 1 U 3/1/22  SCDs  PCU patient

## 2022-03-02 NOTE — FLOWSHEET NOTE
03/02/22 1045   Negative Pressure Wound Therapy Foot Left;Dorsal   Placement Date/Time: 02/02/22 1100   Pre-existing: No  Inserted by: Bhavesh HUNTLEY  Location: Foot  Wound Location Orientation: Left;Dorsal   $ Standard NPWT >50 sq cm PER TX $ Yes   Wound Type Surgical   Unit Type Vac Ulta with Veraflo   Dressing Type Black foam   Number of pieces used 1   Cycle Continuous   Target Pressure (mmHg) 125   Intensity 1   Irrigation Solution Sodium chloride 0.9%   Instillation Volume  14 mL   Soak Time  3 minutes   Vac Frequency 2 hours   Canister changed? Yes   Dressing Status Changed   Dressing Changed Changed/New   Drainage Amount Scant   Drainage Description Serous   Dressing Change Due 03/04/22   Wound Assessment Granulation tissue; Hyper granulation tissue; Red  (exposed grayish bone and moist white tendons)   Shanita-wound Assessment   (intact, dry)   Odor None     VAC change: Old dressing removed using adhesive remover spray, one black foam out. Periwound skin prepped with barrier wipe and vac drape. Oen black foam used in wound. Good seal obtained. Same Veraflo settings. Next vac change on Friday. Roll gauze and compression stocking reapplied, LLE elevated on pillow with heel floated. Heel remains intact. Pt tolerated well.
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
## 2022-03-02 NOTE — PROGRESS NOTES
CM-ST, VSS, pt remains afebrile, UO WNL MAX drain noted to have mod amount of serousang drainage. Pt is lisa tube feeding @ this time.
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
## 2022-03-02 NOTE — PROGRESS NOTES
PROGRESS NOTE  S:46 yrs Patient  admitted on 1/22/2022 with Hyperglycemia [R73.9]  RODRICK (acute kidney injury) ([facility] [address] [N17.9]  Acute renal failure, unspecified acute renal failure type ([facility] [address] [N17.9]  Syncope, unspecified syncope type [R55] . Today he feels better. Hgb is stable. No further bleeding. Tolerating low rate TFs per J tube      Exam:   Vitals:    03/02/22 1800   BP: (!) 140/80   Pulse: 102   Resp: 16   Temp:    SpO2: 98%      General appearance: alert and appears older than stated age  [de-identified]: Neck supple with midline trachea  Neck: no adenopathy and supple, symmetrical, trachea midline  Lungs: clear to auscultation bilaterally  Heart: regular rate and rhythm, S1, S2 normal, no murmur, click, rub or gallop  Abdomen: soft, non-tender; bowel sounds normal; no masses,  no organomegaly  Extremities: edema 2+     Medications: Reviewed    Labs:  CBC:   Recent Labs     02/28/22  0310 02/28/22  0810 03/01/22  0442 03/01/22  1700 03/02/22  0216 03/02/22  0543 03/02/22  1706   WBC 18.9*  --  12.4*  --   --  9.1  --    HGB 7.2*   < > 7.1*   < > 7.8* 7.8* 7.7*   HCT 21.3*   < > 21.7*   < > 23.3* 22.8* 22.8*   MCV 88.8  --  91.4  --   --  89.3  --      --  202  --   --  185  --     < > = values in this interval not displayed. BMP:   Recent Labs     02/28/22 0310 03/01/22  0442 03/02/22  0543    140 140   K 5.1 4.5 4.2    102 101   CO2 20* 19* 20*   PHOS 6.8* 8.1* 5.5*   BUN 44* 53* 34*   CREATININE 3.8* 4.5* 3.2*       Impression:42year old male with a history of HTN, DM, A fib, and nonischemic cardiomyopathy admitted with septic shock secondary to MSSA bacteremia and L foot abscess complicated by acute respiratory failure and RODRICK.  Hospitalized c/b cardiorespiratory arrest, RODRICK requiring HD and GI bleed secondary to large DU s/p failed IR guided coil embolization of GDA s/p ex lap, oversew of duodenal ulcer and pylorplasty and J tube insertion POD#3    Recommendation:  1. Continue supportive care  2. Switch PPI to BID  3. Monitor Hgb  4. Observe for signs of bleeding  5. TFs per Jtube  6.  PT/OT and speech therapy      Susie Schroeder MD, MD  6:56 PM 3/2/2022
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
## 2022-03-02 NOTE — CARE COORDINATION
INTERDISCIPLINARY PLAN OF CARE CONFERENCE    Date/Time: 3/2/2022 9:44 AM  Completed by:  ROSALIA Olivarez  Case Management    Patient Name:  Valentino Maul  YOB: 1975  Admitting Diagnosis: Hyperglycemia [R73.9]  RODRICK (acute kidney injury) ([facility] [address] [N17.9]  Acute renal failure, unspecified acute renal failure type ([facility] [address] [N17.9]  Syncope, unspecified syncope type [R55]     Admit Date/Time:  1/22/2022  1:32 PM    Chart reviewed. Interdisciplinary team contacted or reviewed plan related to patient progress and discharge plans. Disciplines included Case Management, Nursing, and Dietitian. Current Status:Ongoing. J-Tube. Tube Feeds  PT/OT recommendation for discharge plan of care: Awaiting new orders when appropriate; see note from OT on this date. Expected D/C Disposition:  [facility] [facility]    Discharge Plan Comments: Chart review completed. Pt remains in the ICU. Completed Interdisciplinary rounds with ICU staff. Spoke with Jordan Guzman, liaison at [address] [place] [address] who states pt not medically ready for precert to be started and she will continue to follow to start precert when medically appropriate. CM will continue to follow and assist. Please notify CM if needs or concerns arise.     Home O2 in place on admit: No

## 2022-03-02 NOTE — PROGRESS NOTES
Speech Language Pathology  SLP Attempt Note        Name: Fabian Way  : 1975  Medical Diagnosis: Hyperglycemia [R73.9]  RODRICK (acute kidney injury) ([facility] [address] [N17.9]  Acute renal failure, unspecified acute renal failure type ([facility] [address] [N17.9]  Syncope, unspecified syncope type [R55]    SLP following pt. Per chart review, appears pt is only approved for ice chips from gen surge at this time. Nutrition via J-tube. Planning for UGI to evaluate duodenal ulcer repair in the AM. No charges filed. Will continue to follow.  Thank you,    Jessica Barajas M.A., [address] [facility]  Speech-Language Pathologist  Phone: [phone]

## 2022-03-02 NOTE — PROGRESS NOTES
CM-ST, VSS, Pt remains afebrile, Abd incision D/I w/out evidence of drainage. Pt is resting w/out evidence of distress @ this time.

## 2022-03-02 NOTE — PROGRESS NOTES
likely neuropathy      -Anemia:  Acute GI bleed. s/p IR embolization of the gastroduodenal artery  On 2/25/22. CT angiogram with no active bleeding. OR w exp lap and oversew of DU, pyloroplasty on 2/27/22   S/p multiple units of blood transfusion          Plan/      HD per TTS  Schedule,    Patient may be showing some signs of renal recovery, will assess daily   Strict urine output measurement  Serial labs       Ravi Nix MD  Office: [phone] [facility]  Fax:    [phone] [facility]

## 2022-03-03 NOTE — FLOWSHEET NOTE
03/03/22 0700   Vitals   Temp 98.3 °F (36.8 °C)   Temp Source Bladder   Pulse 88   Heart Rate Source Monitor   Resp 13   /66   MAP (mmHg) 81   BP Method Automatic   CPOT (Critical Patient)   O2 Device None (Room air)   Oxygen Therapy   SpO2 98 %   Vital signs stable. Shift assessment, completed, see flow sheet. NSR, Respirations are easy, even, and unlabored. Continues to saturate well on RA. Bilateral lung sounds diminished. NG tube remains in place to suction. Bowel sounds active. MARIANNE PICC, R IJ CVC WNL. Munoz in place and draining clear yellow urine. Flexi-seal in place with loose, dark stool present. Serosanguinously output noted from MAX drain. J-tube remains secure inplace. TF remains off for upper GI follow through today. Dressings intact to RUE, LLE. W ound vac remains secure in place to LLE. PRN dilaudid provided for complaints of BLE pain. Call light within reach. Bed in lowest position. Bed alarm on.  Will continue to monitor
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
## 2022-03-03 NOTE — PROGRESS NOTES
[facility] GENERAL SURGERY    Surgery Progress Note           POD # 4    PATIENT NAME: Oscar Garcia     TODAY'S DATE: 3/3/2022    INTERVAL HISTORY:    Pt feels okay except for incisional pain. OBJECTIVE:   VITALS:  /76   Pulse 88   Temp 98.3 °F (36.8 °C) (Bladder)   Resp 11   Ht 6' 1\" (1.854 m)   Wt 233 lb 11 oz (106 kg)   SpO2 97%   BMI 30.83 kg/m²     INTAKE/OUTPUT:    I/O last 3 completed shifts: In: 798 [I.V.:491; NG/GT:257; IV Piggyback:50]  Out: 2400 [Urine:1520; Emesis/NG output:400; Drains:180; Stool:300]  No intake/output data recorded. CONSTITUTIONAL:  awake and alert  ABDOMEN:   normal bowel sounds, soft, non-distended, MAX with serosanguineous drainage  INCISION: clean, dry    Data:  CBC: Recent Labs     03/01/22 0442 03/01/22  1700 03/02/22  0543 03/02/22  1706 03/03/22  0438   WBC 12.4*  --  9.1  --  8.4   HGB 7.1*   < > 7.8* 7.7* 7.6*   HCT 21.7*   < > 22.8* 22.8* 22.5*     --  185  --  209    < > = values in this interval not displayed. BMP:    Recent Labs     03/01/22 0442 03/02/22  0543 03/03/22  0438    140 142   K 4.5 4.2 4.1    101 104   CO2 19* 20* 22   BUN 53* 34* 39*   CREATININE 4.5* 3.2* 3.8*   GLUCOSE 75 99 114*     Hepatic: No results for input(s): AST, ALT, ALB, BILITOT, ALKPHOS in the last 72 hours. Mag:    No results for input(s): MG in the last 72 hours. Phos:     Recent Labs     03/01/22 0442 03/02/22  0543 03/03/22  0438   PHOS 8.1* 5.5* 6.0*      INR: No results for input(s): INR in the last 72 hours. Radiology Review: Upper GI ordered    ASSESSMENT AND PLAN:  42 y.o. male status post exploratory laparotomy with pyloroplasty and oversewing of bleeding duodenal ulcer. He is stable today with stable hemoglobin. Check upper GI to be sure there is no stricture or leak at the repair site.   If looks okay, plan to start a diet        Electronically signed by Loreta Vega MD
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
## 2022-03-03 NOTE — PROGRESS NOTES
Nephrology Progress Note   [employer]      Sub/interval history  resting    HD on 3/1 w net 1.7l UF w 1 PRBC transfused   HD on 2/27 with net UF +200 ml , post wt 105.5 kg     Last 24 h uop 1.2 l     ROS: No fever  PSFH: + visitor    Scheduled Meds:   nafcillin  2,000 mg IntraVENous Q4H    pantoprazole  40 mg IntraVENous BID    metoprolol  5 mg IntraVENous Q6H    epoetin angeles-epbx  10,000 Units IntraVENous Once per day on Tue Thu Sat    [Held by provider] sucralfate  1 g Oral 4x Daily AC & HS    [Held by provider] dilTIAZem  30 mg Oral 4 times per day    [Held by provider] b complex-C-folic acid  1 capsule Oral Daily    [Held by provider] carvedilol  12.5 mg Oral BID WC    insulin lispro  0-18 Units SubCUTAneous Q4H    povidone-iodine   Topical Daily    [Held by provider] heparin (porcine)  5,000 Units SubCUTAneous 3 times per day    sodium chloride flush  5-40 mL IntraVENous 2 times per day     Continuous Infusions:   sodium chloride      sodium chloride      sodium chloride      sodium chloride 25 mL (02/27/22 0548)    dextrose       PRN Meds:.sodium chloride, sodium chloride, HYDROmorphone, ondansetron, oxyCODONE-acetaminophen, heparin (porcine), sodium chloride, albumin human, heparin (porcine), sodium chloride flush, sodium chloride, acetaminophen **OR** acetaminophen, glucose, glucagon (rDNA), dextrose, dextrose bolus (hypoglycemia) **OR** dextrose bolus (hypoglycemia)    Objective/     Vitals:    03/03/22 0900 03/03/22 1000 03/03/22 1100 03/03/22 1200   BP: (!) 140/77 (!) 146/91 (!) 146/91 128/76   Pulse: 99 103 104 94   Resp: 12 15 17 15   Temp:   98 °F (36.7 °C) 99 °F (37.2 °C)   TempSrc:    Bladder   SpO2: 98% 99% 100% 98%   Weight:       Height:         24HR INTAKE/OUTPUT:      Intake/Output Summary (Last 24 hours) at 3/3/2022 1342  Last data filed at 3/3/2022 1100  Gross per 24 hour   Intake 798 ml   Output 1970 ml   Net -1172 ml     Gen: alert, awake  Neck: No JVD  Skin: Unremarkable  Cardiovascular:  S1, S2 without m/r/g   Respiratory: decreased BS  Abdomen:  Soft,drains in situ   Extremities: trace lower extremity edema  Neuro/Psy: AAoriented times 3 ; moves all 4 ext    Data/  Recent Labs     03/01/22 0442 03/01/22  1700 03/02/22  0543 03/02/22  1706 03/03/22  0438   WBC 12.4*  --  9.1  --  8.4   HGB 7.1*   < > 7.8* 7.7* 7.6*   HCT 21.7*   < > 22.8* 22.8* 22.5*   MCV 91.4  --  89.3  --  89.3     --  185  --  209    < > = values in this interval not displayed. Recent Labs     03/01/22 0442 03/02/22  0543 03/03/22  0438    140 142   K 4.5 4.2 4.1    101 104   CO2 19* 20* 22   GLUCOSE 75 99 114*   PHOS 8.1* 5.5* 6.0*   BUN 53* 34* 39*   CREATININE 4.5* 3.2* 3.8*   LABGLOM 14* 21* 17*   GFRAA 17* 26* 21*       Assessment/     -RODRICK likely related to prerenal factors in the setting of sepsis, use of diuretics and losartan contributing to multifactorial ATN. Floydene Dines is not suggestive of staph associated glomerulonephritis.  Patient did not respond to IV fluids and developed acute pulmonary edema and renal replacement therapy initiated on 1/23/22        Now had dialysis dependence x 4 weeks, making some urine but limited recovery         On TTS schedule but missed on Saturday due to other medical issues        -Acute pulmonary edema            Resolved with dialysis / now looks euvolemic      -S/P Cardio respiratory arrest             Prolonged hospital stay       -Sepsis with MSSA bacteremia with left foot abscess s/p drainage, osteomyelitis, left hand I&D            Low-grade fever on 2/19     -Anemia - prn prbc's     1 on 2/28 and 1 on 3/1    -Diabetes, uncontrolled     -Hypertension     -Weakness:  Prolonged ICU stay following MSSA bacteremia complicated by cardiac arrest.  Now with critical illness myopathy and likely neuropathy      -Anemia:  Acute GI bleed. s/p IR embolization of the gastroduodenal artery  On 2/25/22. CT angiogram with no active bleeding. OR w exp lap and oversew of DU, pyloroplasty on 2/27/22   S/p multiple units of blood transfusion ; unremarkable upper GI series on 3/3        Plan/      -Hold dialysis on 3/3, will assess daily needs    Patient is making more urine and showing some signs of early renal recovery  -Serial renal panel  -daily wts and strict i/o  -renal dose medications   -avoid nephrotoxins          Heidy De Paz MD  Office: [phone]  Fax:    Dinah Smiley [phone]

## 2022-03-03 NOTE — PROGRESS NOTES
NG tube removed at this time per surgery orders. Pt tolerated well. Clear liquid diet ordered. SLP eval ordered. Pt denies any further assistance at this time. Wife updated on plan of care at bedside. Will continue to monitor.
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
## 2022-03-03 NOTE — PLAN OF CARE
Nutrition Problem #1: Inadequate oral intake  Intervention: Food and/or Nutrient Delivery: Continue Current Diet,Start Oral Nutrition Supplement,Continue Current Tube Feeding  Nutritional Goals: patient will consume 75% or greater of meals on ADULT DIET;  Clear Liquid diet order x 3 meals per day + he will consume 75% or greater of Ensure Clear with meals and patient will tolerate TF regimen, per surgery guidance, until po intake is adequate

## 2022-03-03 NOTE — CARE COORDINATION
INTERDISCIPLINARY PLAN OF CARE CONFERENCE    Date/Time: 3/3/2022 11:25 AM  Completed by: ROSALIA Love   Case Management    Patient Name:  Juice Shell  YOB: 1975  Admitting Diagnosis: Hyperglycemia [R73.9]  RODRICK (acute kidney injury) ([facility] [N17.9]  Acute renal failure, unspecified acute renal failure type ([facility] [N17.9]  Syncope, unspecified syncope type [R55]     Admit Date/Time:  1/22/2022  1:32 PM    Chart reviewed. Interdisciplinary team contacted or reviewed plan related to patient progress and discharge plans. Disciplines included Case Management, Nursing, and Dietitian. Current Status:Ongoing   PT/OT recommendation for discharge plan of care: SNF    Expected D/C Disposition:  [address] [facility])    Discharge Plan Comments: Chart review completed. Spoke with Steven Szymanski [facility] liaison who states she is continuing to follow and will start precert for pt when medically stable. GABRIEL notes per chart review, pt having an upper GI eval today.      Home O2 in place on admit: No

## 2022-03-03 NOTE — PROGRESS NOTES
PROGRESS NOTE  S:46 yrs Patient  admitted on 1/22/2022 with Hyperglycemia [R73.9]  RODRICK (acute kidney injury) ([facility] [N17.9]  Acute renal failure, unspecified acute renal failure type ([facility] [N17.9]  Syncope, unspecified syncope type [R55] . Today he complains of improving abdominal pain at surgical sites. He is passing brown BMs per [employer].     Exam:   Vitals:    03/03/22 1200   BP: 128/76   Pulse: 94   Resp: 15   Temp: 99 °F (37.2 °C)   SpO2: 98%      General appearance: alert, appears stated age, cooperative, fatigued, no distress and syndromic appearance - chronically ill appearing  HEENT: Neck supple with midline trachea  Neck: no adenopathy and supple, symmetrical, trachea midline  Lungs: clear to auscultation bilaterally  Heart: regular rate and rhythm, S1, S2 normal, no murmur, click, rub or gallop  Abdomen: normal findings: no masses palpable and symmetric and abnormal findings:  tenderness mild at surgical sites and surgical sites clean, bandaged, and dry  Extremities: extremities normal, atraumatic, no cyanosis or edema     Medications: Reviewed    Labs:  CBC:   Recent Labs     03/01/22  0442 03/01/22  1700 03/02/22  0543 03/02/22  1706 03/03/22  0438   WBC 12.4*  --  9.1  --  8.4   HGB 7.1*   < > 7.8* 7.7* 7.6*   HCT 21.7*   < > 22.8* 22.8* 22.5*   MCV 91.4  --  89.3  --  89.3     --  185  --  209    < > = values in this interval not displayed. BMP:   Recent Labs     03/01/22  0442 03/02/22  0543 03/03/22  0438    140 142   K 4.5 4.2 4.1    101 104   CO2 19* 20* 22   PHOS 8.1* 5.5* 6.0*   BUN 53* 34* 39*   CREATININE 4.5* 3.2* 3.8*     LIVER PROFILE: No results for input(s): AST, ALT, LIPASE, PROT, BILIDIR, BILITOT, ALKPHOS in the last 72 hours. Invalid input(s): AMYLASE,  ALB  PT/INR: No results for input(s): INR in the last 72 hours.     Invalid input(s): PT      IMAGING:  FL UGI  Impression   No evidence of duodenal perforation or leak following recent surgery.         Attending Supervising [de-identified] Attestation Statement  The patient is a 42 y.o. male. I have performed a history and physical examination of the patient. I discussed the case with my physician assistant Phillip Loving PA-C    I reviewed the patient's Past Medical History, Past Surgical History, Medications, and Allergies. Physical Exam:  Vitals:    03/03/22 1600 03/03/22 1700 03/03/22 1800 03/03/22 1938   BP: 122/75 121/67 118/76 129/78   Pulse: 93 95 92 94   Resp: 14 13 13 16   Temp: 99.3 °F (37.4 °C)   98.5 °F (36.9 °C)   TempSrc: Bladder   Oral   SpO2: 100% 98% 97% 96%   Weight:       Height:           Physical Examination: General appearance - ill-appearing  Mental status - alert, oriented to person, place, and time  Eyes - pupils equal and reactive, extraocular eye movements intact  Neck - supple, no significant adenopathy  Chest - clear to auscultation, no wheezes, rales or rhonchi, symmetric air entry  Heart - normal rate, regular rhythm, normal S1, S2, no murmurs, rubs, clicks or gallops  Abdomen - soft, nontender, nondistended, no masses or organomegaly  Extremities - no pedal edema noted        Impression: 42year old male with a history of HTN, DM, A fib, and nonischemic cardiomyopathy admitted with septic shock secondary to MSSA bacteremia and L foot abscess complicated by acute respiratory failure and RODRICK. Hospitalization c/b cardiorespiratory arrest, RODRICK requiring HD and GI bleed secondary to large DU s/p failed IR guided coil embolization of GDA. Repeat EGD showed active duodenal bleeding s/p ex lap, oversew of duodenal ulcer and pyloroplasty, and J tube insertion POD #4.       Recommendation:  1. Continue supportive care  2. Monitor Hgb  3. Observe for signs of bleeding  4. Monitor and document output   5. Transfuse as needed if Hgb < 7.0  6. Continue Pantoprazole 40 mg BID   7. Continue TFs per J tube as tolerated   8. Diet per surgery team  9.  Encourage PT/OT and SLP  10. ID, pulmonology, and wound care following  11. Continue HD per nephrology  12. Will sign off  13. Call with questions       Yuki Cortes PA-C  1:49 PM 3/3/2022                      42year old male with a history of HTN, DM, A fib, and nonischemic cardiomyopathy admitted with septic shock secondary to MSSA bacteremia and L foot abscess complicated by acute respiratory failure and RODRICK. Hospitalized c/b cardiorespiratory arrest, RODRICK requiring HD and GI bleed secondary to large DU s/p failed IR guided coil embolization of GDA s/p ex lap, oversew of duodenal ulcer and pylorplasty and J tube insertion POD#4     Continue supportive care. PPI BID x 2m. Monitor Hgb. Advance TFs per J tube. Will sign off.  Call with questions    Cindy Weathers MD          (O) [phone]  Yahaira November

## 2022-03-04 NOTE — PROGRESS NOTES
Pt transported from ICU to PCU room 322. All belongings at bedside. Vitals as shown below. Repositioned patient onto left side.      Vitals:    03/03/22 1938   BP: 129/78   Pulse: 94   Resp: 16   Temp: 98.5 °F (36.9 °C)   SpO2: 96%      Amber Prieto RN
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
## 2022-03-04 NOTE — FLOWSHEET NOTE
03/04/22 1345   Negative Pressure Wound Therapy Foot Left;Dorsal   Placement Date/Time: 02/02/22 1100   Pre-existing: No  Inserted by: Lizet HUNTLEY  Location: Foot  Wound Location Orientation: Left;Dorsal   $ Standard NPWT >50 sq cm PER TX $ Yes   Wound Type Surgical   Unit Type Vac Ulta with Veraflo   Dressing Type Black foam   Number of pieces used 1   Cycle Continuous   Target Pressure (mmHg) 125   Intensity 1   Irrigation Solution Sodium chloride 0.9%   Instillation Volume  14 mL   Soak Time  3 minutes   Vac Frequency 2 hours   Canister changed? No   Dressing Status Changed   Dressing Changed Changed/New   Drainage Amount Scant   Drainage Description Other (Comment)   Dressing Change Due   (clear)   Wound Assessment Granulation tissue; Red  (Exposed bone and tendon, undermining filling in 1.2cm @8:00)   Odor None       VAC change:  Pt transferred to PCU, up in chair at this time. When pt transferred from ICU, charging cord not sent with pt, VAC machine off during night shift. Cord found and connected to machine, plugged to outlet. Wound vac Old dressing removed using adhesive remover spray, one black foam out. Periwound skin prepped with barrier wipe and vac drape. One black foam used in wound. Good seal obtained. Same Veraflo settings. Next vac change on Monday. Compression stocking reapplied, LLE elevated on pillow with heel floated. Heel remains intact. Pt tolerated well.

## 2022-03-04 NOTE — PROGRESS NOTES
Patient complaints of pain in bottom. Repositioned 5 times. Patient given PRN pain medication. Complaining of nausea, so zofran and percocet given instead of dilaudid. Patient given specialty cup to drink out of.

## 2022-03-04 NOTE — PROGRESS NOTES
Speech Language Pathology  Facility/Department: [address] [place] [address] PCU  Dysphagia Daily Treatment Note     Recommendations: SLP recommends to continue with clear liquids per gen surge recs. From SLP standpoint, ok to advance to minced and moist and thin liquids once cleared by gen surge. Improved tolerance to PO and overall stamina.  Risk Management: Assist feed, upright as possible for all oral intake, small bites/sips, straws ok, distant supervision, general aspiration precautions. If the patient exhibits s/s of aspiration and/or worsening respiratory status, hold PO and contact SLP.    NAME: Fabian Way  : 1975  MRN: [id number]    Patient Diagnosis(es):   Patient Active Problem List    Diagnosis Date Noted    Abnormal CXR     Gastrointestinal hemorrhage     Probable abscess of upper lobe of right lung without pneumonia ([address]     Diabetic ulcer of left midfoot associated with type 2 diabetes mellitus, with necrosis of bone ([address] 2022    Duodenal ulcer 2022    Upper GI bleeding     Paroxysmal atrial fibrillation (HCC)     Severe protein-calorie malnutrition ([address] 2022    Acute osteomyelitis of left hand ([address] 2022    Pressure injury of deep tissue of sacral region 2022    Enterococcal infection 2022    Staphylococcal arthritis of left foot ([address] 2022    Antibiotic-associated diarrhea 2022    Hypertriglyceridemia     MSSA bacteremia 2022    Third degree burn of left hand 2022    Cardiopulmonary arrest ([address]     RODRICK (acute kidney injury) ([address] 2022    Acute osteomyelitis of left foot ([address] 10/26/2020    Allergic rhinitis 2020    Osteoarthritis     Mixed hyperlipidemia 2016    Cardiomyopathy ([address] 2014    Hypertension     Uncontrolled type 2 diabetes mellitus with diabetic polyneuropathy (HCC)      Allergies:    Allergies   Allergen Reactions    Januvia [Sitagliptin] Nausea Only     Has taken metformin without side effects in the past.  Nausea with Janumet in the past.     Metformin And Related      GI Upset    Vancomycin      Pt had red face, swelling itching of eyelids, sore throat after receiving vancmomyin and cefepime. I think this was a histamine releasing reaction from vancomycin most likely. The cefepime was switched to Zosyn and patient had no reaction to Zosyn    Mustard Oil [Allyl Isothiocyanate] Swelling and Rash     Onset Date: Pt admitted to ICU on (01/22)     Subjective: Pt seen in room in chair with RN permission     Pain: The patient does not complain of pain     Current Diet: ADULT TUBE FEEDING; Jejunostomy; Renal Formula; Continuous; 20; Yes; 15; Q 4 hours; 50; 30; Q 3 hours; Protein; 1 P2Go BID via J tube  ADULT DIET; Clear Liquid  ADULT ORAL NUTRITION SUPPLEMENT; Breakfast, Lunch, Dinner; Clear Liquid Oral Supplement    Diet Tolerance:  Patient tolerating current diet level without signs/symptoms of penetration / aspiration. Dysphagia Treatment and Impressions:   Pt seen in room in chair with RN permission   Chart Review/Interview: General surgery continues to recommend clear liquid diet due to nausea. He is POD #5 following exploratory laparotomy with pyloroplasty and oversewing of bleeding duodenal ulcer. Pt reports tolerating clear liquid diet. Pt agreeable for PO trials. Pt was moved to PCU from ICU.  Respiratory Status: Pt with SPO2% of 97 on RA  with RR of 17   Liquid PO Trials:    IDDSI 0 Thin:  Assessed via straw: no anterior bolus loss , suspect functional A-P bolus transit, swallow timing subjectively appears timely, no clinical s/s of aspiration and vitals stable.     Solid PO Trials   IDDSI 5 Minced and Moist:   suspect functional A-P bolus transit, swallow timing subjectively appears timely, grossly functional mastication, oral clearance grossly WFL, no clinical s/s of aspiration and vitals stable   Education: SLP edu pt re: Role of SLP, Rationale for dysphagia tx, Recommended compensatory strategies, Aspiration precautions, POC, Evidenced based practice for current recommendations and treatment and Importance of oral care to reduce adverse affects in the event of aspiration. Pt verbalized understanding and RN aware of recommendations   Assessment: Pt tolerating clear liquid diet consisting of minced and moist solids, and thin liquids with no clinical s/s of aspiration. Good carryover of recommended compensatory strategies.  Continued stamina and overall tolerance improved.  Recommendations: SLP recommends to continue with clear liquids per gen surge recs. From SLP standpoint, ok to advance to minced and moist and thin liquids once cleared by gen surge. Improved tolerance to PO and overall stamina.  Risk Management: Assist feed, upright as possible for all oral intake, small bites/sips, straws ok, distant supervision, general aspiration precautions. If the patient exhibits s/s of aspiration and/or worsening respiratory status, hold PO and contact SLP. Dysphagia Goals:  Timeframe for Long-term Goals: 7 days (03/03/22)- extend 10 additional days (03/13/2022)  Goal 1: The patient will tolerate least restrictive diet with no clinical s/s of aspiration or worsening respiratory/pulmonary status  3/4/2022 : Goal addressed, see above. Ongoing, progressing. Short-term Goals  Timeframe for Short-term Goals: 5 days (03/01/22)- extend 8 additional days (03/11/2022)    02/21/22)     Goal met     Goal 2: The patient/caregiver will demonstrate understanding of compensatory swallow strategies, for improved swallow safety   3/4/2022 : Goal addressed, see above. Ongoing, progressing.     Goal 3: The patient will tolerate instrumental assessment when able   Goal met      Goal 4: The patient will tolerate recommended diet with no clinical s/s of aspiration 5/5   3/4/2022 : Goal addressed, see above. Ongoing, progressing.     Speech/Language/Cog Goals: N/A    Recommendations:  Solid Consistency: IDDSI 5 Minced and moist Solids  Liquid Consistency: IDDSI 0 Thin Liquids - straws OK  Medication: Meds whole with thin liquids    Patient/Family/Caregiver Education: See above    Compensatory Strategies: See above    Plan:    Continued Dysphagia treatment with goals per plan of care. Discharge Recommendations: LTACH/SNF-defer to PT/OT    If pt discharges from hospital prior to Speech/Swallowing discharge, this note serves as tx and discharge summary.      Total Treatment Time / Charges     Time in Time out Total Time / units   Cognitive Tx         Speech Tx      Dysphagia Tx 1150 1204 14 mins / 1 unit     Signature:  [facility]  Clinician     Co-Signing Supervisor:  Danna Stark M.A., [address] [facility] #[id number]  Speech-Language Pathologist  Phone: [phone]

## 2022-03-04 NOTE — PROGRESS NOTES
[facility] GENERAL SURGERY    Surgery Progress Note           POD # 5    PATIENT NAME: Jennifer Carrillo     TODAY'S DATE: 3/4/2022    INTERVAL HISTORY:    Pt feels well. He just has some mild nausea with taking the liquids. OBJECTIVE:   VITALS:  BP (!) 143/90   Pulse 91   Temp 97.2 °F (36.2 °C) (Oral)   Resp 18   Ht 6' 1\" (1.854 m)   Wt 235 lb 11.2 oz (106.9 kg)   SpO2 97%   BMI 31.10 kg/m²     INTAKE/OUTPUT:    I/O last 3 completed shifts: In: 971 [P.O.:480; I.V.:491]  Out: 2310 [Urine:1750; Drains:260; Stool:300]  No intake/output data recorded. CONSTITUTIONAL:  awake and alert  ABDOMEN:   hypoactive bowel sounds, soft, non-distended, drain with just a small amount of serosanguineous drainage  INCISION: clean, dry    Data:  CBC: Recent Labs     03/02/22  0543 03/02/22  0543 03/02/22  1706 03/03/22 0438 03/04/22  0627   WBC 9.1  --   --  8.4 7.5   HGB 7.8*   < > 7.7* 7.6* 7.3*   HCT 22.8*   < > 22.8* 22.5* 21.8*     --   --  209 234    < > = values in this interval not displayed. BMP:    Recent Labs     03/02/22  0543 03/03/22 0438 03/04/22  0627    142 142   K 4.2 4.1 3.7    104 105   CO2 20* 22 25   BUN 34* 39* 43*   CREATININE 3.2* 3.8* 3.8*   GLUCOSE 99 114* 133*     Hepatic: No results for input(s): AST, ALT, ALB, BILITOT, ALKPHOS in the last 72 hours. Mag:    No results for input(s): MG in the last 72 hours. Phos:     Recent Labs     03/02/22 0543 03/03/22 0438 03/04/22 0627   PHOS 5.5* 6.0* 6.6*      INR: No results for input(s): INR in the last 72 hours. Radiology Review: Upper GI showed no leak    ASSESSMENT AND PLAN:  42 y.o. male status post exploratory laparotomy with poor plastied oversewing bleeding duodenal ulcer. Labs are stable. Continue clear liquids until his nausea resolves.   Encourage activity        Electronically signed by Stephania De Los Santos MD

## 2022-03-04 NOTE — FLOWSHEET NOTE
03/04/22 0415   Vital Signs   Temp 97.2 °F (36.2 °C)   Temp Source Oral   Pulse 91   Heart Rate Source Monitor   Resp 18   BP (!) 143/90   BP Location Left upper arm   Patient Position Semi fowlers   Level of Consciousness Alert (0)   MEWS Score 1   Oxygen Therapy   SpO2 97 %   Pulse Oximeter Device Mode Continuous   Dilaudid provided per mar for pain 7/10 in back and buttocks. Repositioned patient and applied ointment to wound on coccyx.      Dario Aviles RN

## 2022-03-04 NOTE — PROGRESS NOTES
Patient continues to need repositioning about every 30 minutes due to pain. Patient refusing vitals and care due to pain. Sweating and rapid breathing due to pain. PRN dilaudid administered. Patient stated some relief. Patient did allow vitals prior to pain medication.

## 2022-03-04 NOTE — PROGRESS NOTES
[facility] Progress Note           Subjective/   42y.o. year old male who we are seeing in consultation for RODRICK requiring HD. HPI:  Last HD on 3/1 with 1.7 liters removed. Renal function poor though stable, urine output of 1.1 liters over last 24 hours. ROS:  +weak, intake reduced, denies any shortness of breath. Objective/   GEN:  Chronically ill, BP (!) 140/85   Pulse 91   Temp 97.2 °F (36.2 °C) (Oral)   Resp 18   Ht 6' 1\" (1.854 m)   Wt 235 lb 11.2 oz (106.9 kg)   SpO2 97%   BMI 31.10 kg/m²   HEENT: non-icteric, no JVD  CV: S1, S2 without m/r/g; no LE edema  RESP: CTA B without w/r/r; breathing wnl  ABD: +bs, soft, nt, no hsm  SKIN: warm, no rashes  ACCESS: R IJ vascath    Data/  Recent Labs     03/02/22  0543 03/02/22  0543 03/02/22  1706 03/03/22  0438 03/04/22  0627   WBC 9.1  --   --  8.4 7.5   HGB 7.8*   < > 7.7* 7.6* 7.3*   HCT 22.8*   < > 22.8* 22.5* 21.8*   MCV 89.3  --   --  89.3 89.2     --   --  209 234    < > = values in this interval not displayed.      Recent Labs     03/02/22  0543 03/03/22  0438 03/04/22  0627    142 142   K 4.2 4.1 3.7    104 105   CO2 20* 22 25   GLUCOSE 99 114* 133*   PHOS 5.5* 6.0* 6.6*   BUN 34* 39* 43*   CREATININE 3.2* 3.8* 3.8*   LABGLOM 21* 17* 17*   GFRAA 26* 21* 21*       Assessment/     -RODRICK likely related to prerenal factors in the setting of sepsis, use of diuretics and losartan contributing to multifactorial ATN. Blase Askew is not suggestive of staph associated glomerulonephritis.  Patient did not respond to IV fluids and developed acute pulmonary edema and renal replacement therapy initiated on 1/23/22        Now had dialysis dependence x 4 weeks, making some urine but limited recovery         On TTS schedule but missed on Saturday due to other medical issues      -Acute pulmonary edema            Resolved with dialysis / now looks euvolemic      -S/P Cardio respiratory arrest             Prolonged hospital stay       -Sepsis with MSSA bacteremia with left foot abscess s/p drainage, osteomyelitis, left hand I&D            Low-grade fever on 2/19     -Anemia - prn prbc's           1 on 2/28 and 1 on 3/1     -Diabetes, uncontrolled     -Hypertension     -Weakness:  Prolonged ICU stay following MSSA bacteremia complicated by cardiac arrest.  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  s/p IR embolization of the gastroduodenal artery  On 2/25/22.  CT angiogram with no active bleeding. OR w exp lap and oversew of DU, pyloroplasty on 2/27/22   S/p multiple units of blood transfusion ; unremarkable upper GI series on 3/3       Plan/     - Holding dialysis today - monitoring daily  - Trend labs, bp's, & urine output    ____________________________________  Flores Epperson MD  [facility]  [email or web address]  Office: [phone]

## 2022-03-04 NOTE — PROGRESS NOTES
Report given to [facility] , RN at patient bedside. Pt is stable, call light in reach, with no needs at this time. Care transferred.      Denis Taylor RN

## 2022-03-04 NOTE — PROGRESS NOTES
4 Eyes Skin Assessment     The patient is being assess for   Transfer to New Unit    I agree that 2 RN's have performed a thorough Head to Toe Skin Assessment on the patient. ALL assessment sites listed below have been assessed. Areas assessed for pressure by both nurses:   [x]   Head, Face, and Ears   [x]   Shoulders, Back, and Chest, Abdomen  [x]   Arms, Elbows, and Hands   [x]   Coccyx, Sacrum, and Ischium  [x]   Legs, Feet, and Heels      DTI/Unstageable pressure injury to buttocks. Healing wound to left hand. Wound vac in place left foot. Small possible dti to back of left head. Skin Assessed Under all Medical Devices by both nurses:  rea              All Mepilex Borders were peeled back and area peeked at by both nurses:    Please list where Mepilex Borders are located:               **SHARE this note so that the co-signing nurse is able to place an eSignature**    Co-signer eSignature: {Esignature:[id number]}    Does the Patient have Skin Breakdown related to pressure?   Yes LDA WOUND CARE was Initiated documentation include the Shanita-wound, Wound Assessment, Measurements, Dressing Treatment, Drainage, and Color\",              Shimon Prevention initiated:  Yes   Wound Care Orders initiated:  Already ordered      [id number] nurse consulted for Pressure Injury (Stage 3,4, Unstageable, DTI, NWPT, Complex wounds)and New or Established Ostomies:  Yes      Primary Nurse eSignature: Electronically signed by Jose Luis German RN on 3/4/22 at 4:46 AM EST

## 2022-03-04 NOTE — PROGRESS NOTES
3.5 cm gastric and duodenal bulb ulcer  · S/P GDA embolization 2/25/22  · Probable recurrent proximal duodenal bleed on 2/26/22 CTA  · Post-op oversew of duodenal ulcer, pyloroplasty and feeding jejunostomy 2/27/22  · Cardiopulmonary arrest x 2 1/23/2022, required CPR, TTM - rewarmed 8 pm 1/25/22  · Acute kidney failure  · MSSA bacteremia  · DM with hyperglycemia   · L foot abscess drained 1/24/2022, MSSA; MRI with large fluid collection and changes c/w osteomyelitis, s/p debridement by Dr. Lua Class on 2/1/22  · L hand burn with deep tissue injury & abscess, s/p debridement on 2/5/22  · Paroxysmal AFIB/flutter with RVR  · Cardiomyopathy EF 35% on Echo 1/24/22     PLAN:  Nafcillin per ID for MSSA osteo of foot  Nephrology following for HD  SLP following, TF per GI, NG out, clear liquis  Protonix gtt per Gastroenterology    S/P 3 U PRBC prior to 2/18/22; 1 U PRBC 2/24/22; 4 U PRBC 2/25/22; 2 U PRBC 2/26/22, 2 U 2/27/22, 2 U 2/28/22, 1 U 3/1/22  Repeat CT Chest will be per infectious disease recommendations   SCDs  Call with questions     I contributed to updating portions of this encounter note.    Saeed López PA-C on 3/4/22 at 10:44 AM EST
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
## 2022-03-04 NOTE — FLOWSHEET NOTE
IDR referral. Pt busy with care at the times of visit. No family present.  Available to follow up as needed

## 2022-03-04 NOTE — PROGRESS NOTES
Bedside report and Pt care transferred to [facility]. Pt denies any assistance at this time.

## 2022-03-04 NOTE — PROGRESS NOTES
Physical Therapy  Attempt. Upon arrival, patient sleeping-wife present. Patients wife reported that patient received pain medication and medication for nausea. Patients wife requests that patient remain asleep at this time. RN aware and agrees with plan-as patient was OOB earlier this date. Will continue to follow and re-attempt as therapist available this date.   Amira Wahl, PT #[id number]  16:50-no charge

## 2022-03-05 NOTE — PROGRESS NOTES
Dressing changed to left hand, triad applied to back of head and sacrum.  Prn PO percocet given for pain control

## 2022-03-05 NOTE — PROGRESS NOTES
[facility] Progress Note           Subjective/   42y.o. year old male who we are seeing in consultation for RODRICK requiring HD. HPI:  Last HD on 3/1 with 1.7 liters removed. Renal function slowly improving off HD, urine output of 650 ml over last 24 hours. ROS:  +weak, intake better, denies any shortness of breath.     Objective/   GEN:  Chronically ill, BP (!) 141/93   Pulse 97   Temp 97.1 °F (36.2 °C) (Oral)   Resp 16   Ht 6' 1\" (1.854 m)   Wt 237 lb 2.2 oz (107.6 kg)   SpO2 96%   BMI 31.29 kg/m²   HEENT: non-icteric, no JVD  CV: S1, S2 without m/r/g; no LE edema  RESP: CTA B without w/r/r; breathing wnl  ABD: +bs, soft, nt, no hsm  SKIN: warm, no rashes  ACCESS: R IJ vascath    Data/  Recent Labs     03/03/22 0438 03/04/22  0627 03/05/22  0655   WBC 8.4 7.5 7.7   HGB 7.6* 7.3* 7.4*   HCT 22.5* 21.8* 22.2*   MCV 89.3 89.2 89.3    234 246     Recent Labs     03/03/22 0438 03/04/22  0627 03/05/22  0655    142 139   K 4.1 3.7 3.6    105 102   CO2 22 25 23   GLUCOSE 114* 133* 148*   PHOS 6.0* 6.6* 5.8*   BUN 39* 43* 41*   CREATININE 3.8* 3.8* 3.4*   LABGLOM 17* 17* 20*   GFRAA 21* 21* 24*       Assessment/     -RODRICK likely related to prerenal factors in the setting of sepsis, use of diuretics and losartan contributing to multifactorial ATN.  UA is not suggestive of staph associated glomerulonephritis.  Patient did not respond to IV fluids and developed acute pulmonary edema and renal replacement therapy initiated on 1/23/22        Now had dialysis dependence x 4 weeks, making some urine but limited recovery         On TTS schedule but missed on Saturday due to other medical issues      -Acute pulmonary edema            Resolved with dialysis / now looks euvolemic      -S/P Cardio respiratory arrest             Prolonged hospital stay       -Sepsis with MSSA bacteremia with left foot abscess s/p drainage, osteomyelitis, left hand I&D            Low-grade fever on 2/19     -Anemia - prn prbc's           1 on 2/28 and 1 on 3/1     -Diabetes, uncontrolled     -Hypertension     -Weakness:  Prolonged ICU stay following MSSA bacteremia complicated by cardiac arrest.  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  s/p IR embolization of the gastroduodenal artery  On 2/25/22.  CT angiogram with no active bleeding. OR w exp lap and oversew of DU, pyloroplasty on 2/27/22   S/p multiple units of blood transfusion ; unremarkable upper GI series on 3/3       Plan/     - Holding dialysis - last on 3/1 - monitoring daily  - Trend labs, bp's, & urine output    ____________________________________  Jameel Berumen MD  [facility]  [email or web address]  Office: [phone]

## 2022-03-05 NOTE — PLAN OF CARE
Problem: Falls - Risk of:  Goal: Will remain free from falls  Description: Will remain free from falls  Outcome: Ongoing  Goal: Absence of physical injury  Description: Absence of physical injury  Outcome: Ongoing     Problem: Skin Integrity:  Goal: Will show no infection signs and symptoms  Description: Will show no infection signs and symptoms  Outcome: Ongoing  Goal: Absence of new skin breakdown  Description: Absence of new skin breakdown  Outcome: Ongoing     Problem: Confusion - Acute:  Goal: Absence of continued neurological deterioration signs and symptoms  Description: Absence of continued neurological deterioration signs and symptoms  Outcome: Ongoing  Goal: Mental status will be restored to baseline  Description: Mental status will be restored to baseline  Outcome: Ongoing

## 2022-03-05 NOTE — PROGRESS NOTES
Speech Language Pathology  Attempt Note    Attempted to see pt for dysphagia tx. Pt politely refuses po intake at this time as he is only able to eat/drink small quantities at a time and just had breakfast. Pt c/o acidity of Jello and apple juice. Pt requests to try potato soup, however pt remains on clear liquid diet per GI. Pt voices need to take small bites/sips and \"go slow. \". See ST note 3/4/2022 for recommendations. ST to f/u at later time as pt is appropriate and able to engage on therapy activities. Marisol Miller. Clifford Pickering M.A.  [id number] [facility]  Speech-Language Pathologist

## 2022-03-05 NOTE — PROGRESS NOTES
/78   Pulse 94   Temp 97 °F (36.1 °C) (Oral)   Resp 16   Ht 6' 1\" (1.854 m)   Wt 237 lb 2.2 oz (107.6 kg)   SpO2 100%   BMI 31.29 kg/m²     Assessment complete. Meds passed. Pt denies needs at this time. 3u of insulin given. Prn zofran and dilaudid provided, pain control regimen does not seem to be enough. Will try to implement oral pain control. Pt awake in bed talking to rn. Bedside Mobility Assessment Tool (BMAT):     Assessment Level 1- Sit and Shake    1. From a semi-reclined position, ask patient to sit up and rotate to a seated position at the side of the bed. Can use the bedrail. 2. Ask patient to reach out and grab your hand and shake making sure patient reaches across his/her midline. Fail- Patient is unable to perform tasks, patient is MOBILITY LEVEL 1. Assessment Level 2- Stretch and Point   1. With patient in seated position at the side of the bed, have patient place both feet on the floor (or stool) with knees no higher than hips. 2. Ask patient to stretch one leg and straighten the knee, then bend the ankle/flex and point the toes. If appropriate, repeat with the other leg. Fail- Patient is unable to complete task. Patient is MOBILITY LEVEL 2. Assessment Level 3- Stand   1. Ask patient to elevate off the bed or chair (seated to standing) using an assistive device (cane, bedrail). 2. Patient should be able to raise buttocks off be and hold for a count of five. May repeat once. Fail- Patient unable to demonstrate standing stability. Patient is MOBILITY LEVEL 3. Assessment Level 4- Walk   1. Ask patient to march in place at bedside. 2. Then ask patient to advance step and return each foot. Some medical conditions may render a patient from stepping backwards, use your best clinical judgement. Fail- Patient not able to complete tasks OR requires use of assistive device. Patient is MOBILITY LEVEL 3.        Mobility Level- 1

## 2022-03-05 NOTE — PROGRESS NOTES
Care Plan, Education, Fall Risk, Shimon Scale, and Lift Assessment complete. Patient is resting and reports no needs at this time. Report given to [facility]-MELISSA BOWIE RN.

## 2022-03-05 NOTE — PROGRESS NOTES
Report given to [facility] , RN at patient bedside. Pt is stable, call light in reach, with no needs at this time. Care transferred.      Darline Méndez RN

## 2022-03-05 NOTE — PROGRESS NOTES
[facility] GENERAL SURGERY    Surgery Progress Note           POD # 6    PATIENT NAME: Virgilio Carter     TODAY'S DATE: 3/5/2022    INTERVAL HISTORY:    Pt  C/o diffuse pain due to his neuropathy. Taking clears ok, feels comfortable to advance his diet. OBJECTIVE:   VITALS:  BP (!) 158/99   Pulse 90   Temp 97 °F (36.1 °C) (Oral)   Resp 16   Ht 6' 1\" (1.854 m)   Wt 237 lb 2.2 oz (107.6 kg)   SpO2 97%   BMI 31.29 kg/m²     INTAKE/OUTPUT:    I/O last 3 completed shifts: In: 480 [P.O.:480]  Out: 1540 [Urine:1250; Drains:290]  I/O this shift:  In: -   Out: 585 [Urine:475; Drains:110]              CONSTITUTIONAL:  fatigued and alert  LUNGS:     ABDOMEN:    , soft, non-distended, tenderness noted at incision   INCISION: clean, dry, no drainage, healing, sero-sanguinous drainage in MAX; staples intact    Data:  CBC: Recent Labs     03/03/22 0438 03/04/22 0627 03/05/22  0655   WBC 8.4 7.5 7.7   HGB 7.6* 7.3* 7.4*   HCT 22.5* 21.8* 22.2*    234 246     BMP:    Recent Labs     03/03/22 0438 03/04/22 0627 03/05/22  0655    142 139   K 4.1 3.7 3.6    105 102   CO2 22 25 23   BUN 39* 43* 41*   CREATININE 3.8* 3.8* 3.4*   GLUCOSE 114* 133* 148*     Hepatic: No results for input(s): AST, ALT, ALB, BILITOT, ALKPHOS in the last 72 hours. Mag:    No results for input(s): MG in the last 72 hours. Phos:     Recent Labs     03/03/22 0438 03/04/22 0627 03/05/22  0655   PHOS 6.0* 6.6* 5.8*      INR: No results for input(s): INR in the last 72 hours.       Radiology Review:       ASSESSMENT AND PLAN:  42 y.o. male status post exploratory laparotomy with poor plastied oversewing bleeding duodenal ulcer   - advance to full liquids   - monitor MAX drains as diet is advanced   - will follow         Electronically signed by Tod Aviles MD
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
## 2022-03-05 NOTE — PLAN OF CARE
Problem: Falls - Risk of:  Goal: Will remain free from falls  Description: Will remain free from falls  Outcome: Ongoing     Problem: Skin Integrity:  Goal: Will show no infection signs and symptoms  Description: Will show no infection signs and symptoms  Outcome: Ongoing     Problem: Confusion - Acute:  Goal: Mental status will be restored to baseline  Description: Mental status will be restored to baseline  Outcome: Ongoing     Problem: Discharge Planning:  Goal: Ability to perform activities of daily living will improve  Description: Ability to perform activities of daily living will improve  Outcome: Ongoing     Problem: Injury - Risk of, Physical Injury:  Goal: Will remain free from falls  Description: Will remain free from falls  Outcome: Ongoing

## 2022-03-05 NOTE — FLOWSHEET NOTE
03/04/22 2015   Vital Signs   Temp 97.3 °F (36.3 °C)   Temp Source Oral   Pulse 99   Heart Rate Source Monitor   Resp 21   BP (!) 149/87   BP Location Left upper arm   Patient Position Semi fowlers   Level of Consciousness Alert (0)   MEWS Score 2   Patient Currently in Pain Yes   Pain Assessment   Pain Assessment 0-10   Pain Level 7   Pain Type Acute pain   Pain Location Back   Pain Orientation Left; Outer   Oxygen Therapy   SpO2 98 %   Pulse Oximeter Device Mode Continuous   O2 Flow Rate (L/min) 0 L/min   Vitals as shown above, patient repositioned onto left side. FSBS 195, 3 units ss humalog given per mar. Pt states pain level 7/10 in left side of back. Dilaudid given per mar. Shift assessment completed, see flowsheets.      Dilma Ortiz RN
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
## 2022-03-06 NOTE — PROGRESS NOTES
Received panic results from Karla Valerio in lab H&H 6.6 and 19.6, perfect served Dr. Malissa Phillip with this information.  Diane Stevenson RN

## 2022-03-06 NOTE — PROGRESS NOTES
[facility] Progress Note           Subjective/   42y.o. year old male who we are seeing in consultation for RODRICK requiring HD. HPI:  Last HD on 3/1 with 1.7 liters removed. Renal function slowly improving off HD, urine output of 1,700 ml over last 24 hours. ROS:  +weak, intake improving, denies any shortness of breath. Objective/   GEN:  Chronically ill, /82   Pulse 90   Temp 98.1 °F (36.7 °C) (Oral)   Resp 16   Ht 6' 1\" (1.854 m)   Wt 238 lb 1.6 oz (108 kg)   SpO2 95%   BMI 31.41 kg/m²   HEENT: non-icteric, no JVD  CV: S1, S2 without m/r/g; no LE edema  RESP: CTA B without w/r/r; breathing wnl  ABD: +bs, soft, nt, no hsm  SKIN: warm, no rashes  ACCESS: R IJ vascath    Data/  Recent Labs     03/04/22  0627 03/04/22  0627 03/05/22  0655 03/06/22  0530 03/06/22  1400   WBC 7.5  --  7.7 6.9  --    HGB 7.3*   < > 7.4* 6.6* 7.5*   HCT 21.8*   < > 22.2* 19.6* 21.8*   MCV 89.2  --  89.3 89.9  --      --  246 258  --     < > = values in this interval not displayed.      Recent Labs     03/04/22  0627 03/05/22  0655 03/06/22  0530    139 137   K 3.7 3.6 3.6    102 101   CO2 25 23 23   GLUCOSE 133* 148* 170*   PHOS 6.6* 5.8* 5.1*   BUN 43* 41* 40*   CREATININE 3.8* 3.4* 3.2*   LABGLOM 17* 20* 21*   GFRAA 21* 24* 26*       Assessment/     -RODRICK likely related to prerenal factors in the setting of sepsis, use of diuretics and losartan contributing to multifactorial ATN. Florance Pair is not suggestive of staph associated glomerulonephritis.  Patient did not respond to IV fluids and developed acute pulmonary edema and renal replacement therapy initiated on 1/23/22        Now had dialysis dependence x 4 weeks, making some urine but limited recovery         On TTS schedule but missed on Saturday due to other medical issues      -Acute pulmonary edema            Resolved with dialysis / now looks euvolemic      -S/P Cardio respiratory arrest             Prolonged hospital stay       -Sepsis with MSSA bacteremia with left foot abscess s/p drainage, osteomyelitis, left hand I&D            Low-grade fever on 2/19     -Anemia - prn prbc's           1 on 2/28 and 1 on 3/1 and 1 on 3/6     -Diabetes, uncontrolled     -Hypertension     -Weakness:  Prolonged ICU stay following MSSA bacteremia complicated by cardiac arrest.  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  s/p IR embolization of the gastroduodenal artery  On 2/25/22.  CT angiogram with no active bleeding. OR w exp lap and oversew of DU, pyloroplasty on 2/27/22   S/p multiple units of blood transfusion ; unremarkable upper GI series on 3/3       Plan/     - Holding dialysis - last on 3/1 - monitoring daily  - Neurontin on hold - will need to renally dose once RODRICK better since he is quite dependent on it  - Trend labs, bp's, & urine output    ____________________________________  Florence Crow MD  [facility]  [email or web address]  Office: [phone]

## 2022-03-06 NOTE — PROGRESS NOTES
PT awake in bed c/o pain 7/10 in back, gave dilaudid 1 mg iv, vitals and shift assessment completed, medications given,  Pt has wound vac in place to left foot, MAX drain in R abdomen, PEG tube, vas cath to R neck, rectal tube see flow sheet, call light within reach, will continue to monitor.  Aurelia Martinez RN

## 2022-03-06 NOTE — PROGRESS NOTES
[facility] GENERAL SURGERY    Surgery Progress Note           POD # 7    PATIENT NAME: Vickie Marshall     TODAY'S DATE: 3/6/2022    INTERVAL HISTORY:    Pt  Eating full liquids well, hungry for more than that; still c/o generalized neuropathic pain. OBJECTIVE:   VITALS:  BP (!) 145/86   Pulse 82   Temp 97.6 °F (36.4 °C) (Oral)   Resp 18   Ht 6' 1\" (1.854 m)   Wt 238 lb 1.6 oz (108 kg)   SpO2 99%   BMI 31.41 kg/m²     INTAKE/OUTPUT:    I/O last 3 completed shifts: In: 1081 [P.O.:1071; I.V.:10]  Out: 3435 [Urine:2350; Drains:510; Stool:575]  I/O this shift: In: 5 [P.O.:420]  Out: -               CONSTITUTIONAL:  awake and alert  LUNGS:     ABDOMEN:    , soft, non-distended, non-tender   INCISION: clean, dry, no drainage, healing, sero-sanguinous drainage in MAX    Data:  CBC: Recent Labs     03/04/22 0627 03/05/22 0655 03/06/22  0530   WBC 7.5 7.7 6.9   HGB 7.3* 7.4* 6.6*   HCT 21.8* 22.2* 19.6*    246 258     BMP:    Recent Labs     03/04/22 0627 03/05/22 0655 03/06/22  0530    139 137   K 3.7 3.6 3.6    102 101   CO2 25 23 23   BUN 43* 41* 40*   CREATININE 3.8* 3.4* 3.2*   GLUCOSE 133* 148* 170*     Hepatic: No results for input(s): AST, ALT, ALB, BILITOT, ALKPHOS in the last 72 hours. Mag:    No results for input(s): MG in the last 72 hours. Phos:     Recent Labs     03/04/22 0627 03/05/22 0655 03/06/22  0530   PHOS 6.6* 5.8* 5.1*      INR: No results for input(s): INR in the last 72 hours. Radiology Review:       ASSESSMENT AND PLAN:  42 y.o. male status post exploratory laparotomy with pyloroplasty and oversewing bleeding duodenal ulcer   - advance to low fiber diet   - cont to monitor MAX drains    - low H/H - no sx of blood in stool/rectal tube at this time. Would repeat H/H later today, transfuse if necessary.         Electronically signed by Taran Bhat MD   '

## 2022-03-06 NOTE — PROGRESS NOTES
Report given to Sayda Degroot RN at patient bedside. Pt is stable, call light in reach, with no needs at this time. Care transferred.      Keren San RN

## 2022-03-06 NOTE — PROGRESS NOTES
Wound to buttocks cleaned with saline per patient request and medicated cream applied. Patient repositioned.

## 2022-03-06 NOTE — PROGRESS NOTES
Vitals:    03/05/22 2002   BP: 139/84   Pulse: 96   Resp: 16   Temp: 97.2 °F (36.2 °C)   SpO2: 96%     Vitals as shown above. Shift assessment completed, see flow sheets. Night meds given per mar. Pt reports pain 7/10 in back and buttocks, dilaudid given per mar. Pt is resting with bed alarm on, call light in reach.     Dilma Ortiz RN

## 2022-03-07 PROBLEM — M00.072 STAPHYLOCOCCAL ARTHRITIS OF LEFT FOOT (HCC): Status: RESOLVED | Noted: 2022-01-01 | Resolved: 2022-01-01

## 2022-03-07 PROBLEM — M86.142 ACUTE OSTEOMYELITIS OF LEFT HAND (HCC): Status: RESOLVED | Noted: 2022-01-01 | Resolved: 2022-01-01

## 2022-03-07 PROBLEM — A49.1 ENTEROCOCCAL INFECTION: Status: RESOLVED | Noted: 2022-01-01 | Resolved: 2022-01-01

## 2022-03-07 PROBLEM — A49.01 STAPH AUREUS INFECTION: Status: ACTIVE | Noted: 2022-01-01

## 2022-03-07 NOTE — CARE COORDINATION
INTERDISCIPLINARY PLAN OF CARE CONFERENCE    Date/Time: 3/7/2022 2:53 PM  Completed by: ROSALIA Covarrubias   Case Management    Patient Name:  Sarah Heck  YOB: 1975  Admitting Diagnosis: Hyperglycemia [R73.9]  RODRICK (acute kidney injury) ([facility] [N17.9]  Acute renal failure, unspecified acute renal failure type ([facility] [N17.9]  Syncope, unspecified syncope type [R55]     Admit Date/Time:  1/22/2022  1:32 PM    Chart reviewed. Interdisciplinary team contacted or reviewed plan related to patient progress and discharge plans. Disciplines included Case Management, Nursing, and Dietitian. Current Status: Ongoing  PT/OT recommendation for discharge plan of care: SNF    Expected D/C Disposition:  [facility]  Confirmed plan with patient and/or family Yes confirmed with: (name) pt's wife Kika Truong via phone call    Discharge Plan Comments: Chart review completed. SURESH CASTRO spoke with Lopez Romero at [employer] who is starting pre-cert today. Called and spoke with pt's wife Kika Truong who was updated on the above and remains in agreement for [facility]. Kika Truong aware that pt must be medically stable from the doctor's standpoint for discharge but insurance will need to approve LTACH. She stated understanding and expressed no further questions/concerns. Received voicemail from Arianna Singer with [employer] requesting a call back at [phone]. Called returned and provided Cayla Laguna with [facility] contact information for pt's floor.      Home O2 in place on admit: No

## 2022-03-07 NOTE — PROGRESS NOTES
PROGRESS NOTE    Admit Date:  1/22/2022    Subjective:  42 y.o. male who is seen for evaluation of diabetic foot ulcer and abscess left foot. History obtained from chart. S/P ulcer debridement 2/1/22. I&D left wrist with ortho on 2/5/22. States feels good overall. Wants to get better. No pain in the foot at this time. Wound has been followed by Dr. Pepe Kaiser regularly while inpatient. Concern for a couple areas of exposed bone not granulating and will require OR debridement to facilitate healing.        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 myocarditis    Chronic osteomyelitis of left foot ([address] 10/27/2020    Closed displaced fracture of distal phalanx of right little finger 4/8/2021    Clostridium difficile infection 11/01/2016    Diabetic ulcer of left forefoot associated with type 2 diabetes mellitus, with necrosis of bone ([address] 8/1/2019    Diabetic ulcer of right great toe associated with type 2 diabetes mellitus, with necrosis of bone ([address] 08/2019    Failed soft 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 toe amputation site 10/26/2020    History of hyperbaric oxygen therapy 10/28/2020    [facility]- carmichael 3 - compromised flap    Infective tenosynovitis of extensor tendons of left hand 2/3/2022    Migraine     Possible perforated tympanic membrane     as a result of recurrent otitis    Post-op hematoma of left foot 11/12/2020    Recurrent otitis media     Septic shock (HCC)     Syncope     Tear of medial meniscus of left knee     Tobacco use     Toe osteomyelitis, left ([address] 7/24/2020    VAP (ventilator-associated pneumonia) ([address]        Past Surgical History:        Procedure Laterality Date    ARM SURGERY Left 02/05/2022    LEFT HAND INCISION AND DRAINAGE performed by Elsa Bob MD at [address] Left 02/01/2022    ULCER DEBRIDEMENT LEFT FOOT performed by Gilberto Chavez DPM at [facility]  [address] Right 02/25/2022    successful coil embolization of the gastroduodenal artery    IR NONTUNNELED VASCULAR CATHETER  02/11/2022    IR NONTUNNELED VASCULAR CATHETER 2/11/2022 [facility]    KNEE ARTHROSCOPY Left 08/15/2013    LEFT KNEE ARTHROSCOPY , SYNOVECTOMY       LAPAROTOMY N/A 2/27/2022    PYLOROPLASTY, ULCER OVER-SEW, JEJUNOSTOMY TUBE INSERTION performed by Tara Car MD at [address] Left 07/24/2020    PARTIAL LEFT FOOT AMPUTATION    TOE AMPUTATION Right 08/23/2019    PARTIAL RIGHT FOOT AMPUTATION performed by Gilberto Chavez DPM at [facility] TOE AMPUTATION Left 07/24/2020    PARTIAL LEFT FOOT AMPUTATION performed by Gilberto Chavez DPM at [facility] TOE AMPUTATION Left 10/22/2020    PARTIAL LEFT FOOT AMPUTATION WITH GRAFT APPLICATION performed by Gilberto Chavez DPM at [address] [facility] ENDOSCOPY N/A 02/17/2022    EGD W/ANES.  performed by Jake Montero MD at [address] [place] [address] N/A 2/27/2022    EGD DIAGNOSTIC ONLY performed by Freda Martínez MD at [address]         Current Medications:     insulin lispro  0-18 Units SubCUTAneous 4x Daily AC & HS    PARoxetine  10 mg Oral QAM    traZODone  50 mg Oral Nightly    gabapentin  300 mg Oral TID    tiZANidine  8 mg Oral BID    nafcillin  2,000 mg IntraVENous Q4H    epoetin angeles-epbx  10,000 Units IntraVENous Q MWF    pantoprazole  40 mg IntraVENous BID    sucralfate  1 g Oral 4x Daily AC & HS    dilTIAZem  30 mg Oral 4 times per day    b complex-C-folic acid  1 capsule Oral Daily    carvedilol  12.5 mg Oral BID WC    povidone-iodine   Topical Daily    [Held by provider] heparin (porcine)  5,000 Units SubCUTAneous 3 times per day    sodium chloride flush  5-40 mL IntraVENous 2 times per day       Allergies:  Januvia [sitagliptin], Metformin and related, Vancomycin, and Mustard oil [allyl isothiocyanate]    Social History:    Social History     Tobacco Use    Smoking status: Former Smoker     Packs/day: 0.50     Years: 2.00     Pack years: 1.00     Quit date: 2005     Years since quittin.5    Smokeless tobacco: Former User     Quit date: 2005    Tobacco comment: SMOKED OCCASIONALLY UNTIL 8 YEARS AGO   Vaping Use    Vaping Use: Never used   Substance Use Topics    Alcohol use: Yes     Comment: RARELY    Drug use: No       Family History:       Problem Relation Age of Onset    Diabetes Mother     High Blood Pressure Mother     High Cholesterol Mother     Diabetes Father     High Blood Pressure Father     High Cholesterol Father     Stroke Father     High Blood Pressure Sister     High Blood Pressure Maternal Uncle     High Cholesterol Maternal Uncle     High Blood Pressure Maternal Grandmother        Review of Systems    Not obtained      Objective:   BP (!) 155/83   Pulse 91   Temp 97.7 °F (36.5 °C) (Oral)   Resp 16   Ht 6' 1\" (1.854 m)   Wt 253 lb 1.4 oz (114.8 kg)   SpO2 98%   BMI 33.39 kg/m²     Data:  CBC:   Recent Labs     22  0655 22  0655 22  0530 22  1400 22  0405   WBC 7.7  --  6.9  --  5.8   HGB 7.4*   < > 6.6* 7.5* 7.5*   HCT 22.2*   < > 19.6* 21.8* 21.4*   MCV 89.3  --  89.9  --  89.0     --  258  --  223    < > = values in this interval not displayed. BMP:   Recent Labs     22  0655 22  0530 22  0405    137 137   K 3.6 3.6 3.6    101 102   CO2 23 23 22   PHOS 5.8* 5.1* 4.1   BUN 41* 40* 38*   CREATININE 3.4* 3.2* 3.0*     LIVER PROFILE:   No results for input(s): AST, ALT, LIPASE, BILIDIR, BILITOT, ALKPHOS in the last 72 hours.     Invalid input(s): AMYLASE,  ALB  PT/INR:   No results for input(s): PROT, INR in the last 72 hours. HgBA1c:  Lab Results   Component Value Date    LABA1C 10.6 01/23/2022       Cultures: blood - MSSA    Foot wound - MSSA    Hand wound - Enterococcus faecalis      Imaging: xray left foot -   Remote surgical history of amputation at the 1st and 2nd digits. Margins of   the remaining 1st metatarsal are smooth with slight bony remodeling following   prior surgery. Margins of the remaining 2nd metatarsal are also smooth with   heterotopic bone and fusion of fragments at the base of the previously   remaining proximal phalanx. Subtle erosions are seen at the 3rd MTP joint   involving the base of the phalanx and the distal head of the 3rd metatarsal.   There is severe soft tissue swelling at the prior surgical site. Questionable pattern of subcutaneous gas along the lateral aspect of the left   foot adjacent to the shaft of the 5th metatarsal.  There is severe   disorganization of bone at the tarsometatarsal joints of the 2nd through 5th   digits. Impression:  1. Remote history of amputation at the 1st and 2nd digits. No evidence of   osteomyelitis at prior resection site. 2. Subtle erosions at the 3rd MTP joint are new. This is adjacent to soft   tissue swelling at the prior amputation site. A new focus of osteomyelitis   is suspected. 3. Soft tissue swelling and questionable subcutaneous gas along the lateral   aspect of the left foot. There is also severe disorganization of bone at the   2nd through 5th tarsometatarsal joints. Although pattern may represent   Charcot arthropathy due to the more diffuse appearance, areas of   osteomyelitis cannot be excluded with plain film. Correlate with physical   exam and clinical workup. A follow-up MRI may be helpful for further   evaluation. MRI Left foot -   1.  Diffuse subcutaneous edema with organized complex collection along the   dorsal soft tissues of the foot which communicates with large midfoot   effusion. The dorsal collection measures 2.0 x 4.0 x 4.1 cm. Findings   highly suspicious for abscess and septic joint given patient history. 2. Marrow signal changes throughout the midfoot and extending along the 2nd   through 5th metatarsals which are most suggestive of osteomyelitis in the   setting of soft tissue infection. Underlying Charcot arthropathy also   present. Physical Exam:    DP/PT palpable bilateral  Right foot - no open lesions noted. No pressure lesions noted. Left foot - VAC in place. No erythema left lower leg. Review of wound pictures show ulcer dorsal foot with exposed extensor tendons and bone. + red granulation tissue noted. Prior amputation hallux and 2nd digit  Rocker bottom foot deformity noted left.          Assessment:  Patient Active Problem List   Diagnosis Code    Hypertension I10    Uncontrolled type 2 diabetes mellitus with diabetic polyneuropathy ([facility]) E11.42, E11.65    Cardiomyopathy ([facility] I42.9    Mixed hyperlipidemia E78.2    Allergic rhinitis J30.9    Osteoarthritis M19.90    Acute osteomyelitis of left foot ([facility] M86.172    Acute renal failure ([facility]) N17.9    MSSA bacteremia R78.81, B95.61    Third degree burn of left hand T23.302A    Cardiopulmonary arrest ([facility]) I46.9    Hypertriglyceridemia E78.1    Staphylococcal arthritis of left foot ([facility]) M00.072    Antibiotic-associated diarrhea K52.1, T36.95XA    Enterococcal infection A49.1    Acute osteomyelitis of left hand ([facility]) M86.142    Pressure injury of deep tissue of sacral region L89.156    Severe protein-calorie malnutrition ([facility]) E43    Paroxysmal atrial fibrillation ([facility]) I48.0    Upper GI bleeding K92.2    Duodenal ulcer K26.9    Diabetic ulcer of left midfoot associated with type 2 diabetes mellitus, with necrosis of bone ([facility]) E11.621, L97.424    Probable abscess of upper lobe of right lung without pneumonia ([facility]) J85.2    Gastrointestinal hemorrhage K92.2    Abnormal CXR R93.89    ABLA (acute blood loss anemia) D62     S/P debridement 2/1/22  diabetic foot ulcer left secondary to peripheral neuropathy   diabetes mellitus uncontrolled  Bacteremia (MSSA)  Charcot arthropathy left foot  Acute osteomyelitis left foot secondary to diabetes mellitus       Plan  Patient examined. Reviewed labs and wound pictures. Antibiotics as per Dr. Jackson Covarrubias. [employer] to be continued. Discussed risks, complications, alternatives and benefits with patient. Understands chance of nonhealing wound, infection, need for further surgery, loss of limb or life. Questions answered. NPO at midnight for ulcer debridement left foot. Discussed with Dr. Diego Ramirez and Dr. Jackson Covarrubias. Will follow.          Electronically signed by Blossom Romero DPM on 3/7/2022 at 8:17 AM.

## 2022-03-07 NOTE — PLAN OF CARE
Nutrition Problem #1: Inadequate oral intake  Intervention: Food and/or Nutrient Delivery: Continue Current Diet,Discontinue Oral Nutrition Supplement,Continue Current Tube Feeding  Nutritional Goals: patient will consume 75% or greater of meals on ADULT DIET;  Regular; Low Fiber diet order x 3 meals per day

## 2022-03-07 NOTE — PROGRESS NOTES
Shift assessment complete- see flow sheet. Patient is A/Ox4. VSS. Morning medications given without difficulty. Currently on room air, SpO2 WNL. Lung sounds clear. Denies shortness of breath. Patient denies any further needs. Call light explained and in reach. Bed alarm on. Will continue to monitor.

## 2022-03-07 NOTE — PROGRESS NOTES
[facility] Infectious Disease Progress Note      Katy Daigle     : 1975    DATE OF VISIT:  3/7/2022  DATE OF ADMISSION:  2022       Subjective:     Katy Daigle is a 42 y.o. male whom I've been seeing for a deep diabetic foot infection primarily, at this point. Since I last saw him, he's doing quite well overall. Remained on PCU through the weekend; up in a chair at times. No HD the last several days, and his creatinine continues to improve slowly. No F/C/D. A bit of increasing neuropathic pain (he's asking if his gabapentin dose can be higher). Left foot VAC continues. Still with some diarrhea. Less abd pain; some sacral pain with sitting; no SOB, CP, cough. Mr. Parish Suarez has a past medical history of Abscess of left foot, Abscess of left hand, Acute encephalopathy, Acute hypoxemic respiratory failure ([address] Acute osteomyelitis of right hallux ([address] Cellulitis of left foot, CHF (congestive heart failure) ([address] Chronic osteomyelitis of left foot ([address] Closed displaced fracture of distal phalanx of right little finger, Clostridium difficile infection, Diabetic ulcer of left forefoot associated with type 2 diabetes mellitus, with necrosis of bone ([address] Diabetic ulcer of right great toe associated with type 2 diabetes mellitus, with necrosis of bone ([address] Failed soft 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 toe amputation site, History of hyperbaric oxygen therapy, Infective tenosynovitis of extensor tendons of left hand, Migraine, Possible perforated tympanic membrane, Post-op hematoma of left foot, Recurrent otitis media, Septic shock (HCC), Syncope, Tear of medial meniscus of left knee, Tobacco use, Toe osteomyelitis, left ([address] and VAP (ventilator-associated pneumonia) ([address]     Current Facility-Administered Medications: gabapentin (NEURONTIN) capsule 100 mg, 100 mg, Oral, TID  0.9 % sodium chloride infusion, , IntraVENous, PRN  insulin lispro (HUMALOG) injection vial 0-18 Units, 0-18 Units, SubCUTAneous, 4x Daily AC & HS  PARoxetine (PAXIL) tablet 10 mg, 10 mg, Oral, QAM  traZODone (DESYREL) tablet 50 mg, 50 mg, Oral, Nightly  tiZANidine (ZANAFLEX) tablet 8 mg, 8 mg, Oral, BID  nafcillin 2,000 mg in dextrose 5 % 100 mL IVPB, 2,000 mg, IntraVENous, Q4H  epoetin [place]-epbx (RETACRIT) injection 10,000 Units, 10,000 Units, IntraVENous, Q MWF  0.9 % sodium chloride infusion, , IntraVENous, PRN  pantoprazole (PROTONIX) injection 40 mg, 40 mg, IntraVENous, BID  0.9 % sodium chloride infusion, , IntraVENous, PRN  HYDROmorphone (DILAUDID) injection 1 mg, 1 mg, IntraVENous, Q2H PRN  ondansetron (ZOFRAN) injection 4 mg, 4 mg, IntraVENous, Q6H PRN  oxyCODONE-acetaminophen (PERCOCET) 5-325 MG per tablet 1 tablet, 1 tablet, Oral, Q4H PRN  sucralfate (CARAFATE) tablet 1 g, 1 g, Oral, 4x Daily AC & HS  dilTIAZem (CARDIZEM) tablet 30 mg, 30 mg, Oral, 4 times per day  b complex-C-folic acid (NEPHROCAPS) capsule 1 mg, 1 capsule, Oral, Daily  carvedilol (COREG) tablet 12.5 mg, 12.5 mg, Oral, BID WC  povidone-iodine (BETADINE) 10 % external solution, , Topical, Daily  [Held by provider] heparin (porcine) injection 5,000 Units, 5,000 Units, SubCUTAneous, 3 times per day  heparin (porcine) injection 3,000 Units, 3,000 Units, IntraVENous, PRN  sodium chloride 0.9 % irrigation 1,000 mL, 1,000 mL, Irrigation, Continuous PRN  albumin human 25 % IV solution 12.5 g, 12.5 g, IntraVENous, PRN  heparin (porcine) injection 2,600 Units, 2,600 Units, IntraCATHeter, PRN  sodium chloride flush 0.9 % injection 5-40 mL, 5-40 mL, IntraVENous, 2 times per day  sodium chloride flush 0.9 % injection 5-40 mL, 5-40 mL, IntraVENous, PRN  0.9 % sodium chloride infusion, 25 mL, IntraVENous, PRN  acetaminophen (TYLENOL) tablet 650 mg, 650 mg, Oral, Q6H PRN **OR** acetaminophen (TYLENOL) suppository 650 mg, 650 mg, Rectal, Q6H PRN  glucose (GLUTOSE) 40 % oral gel 15 g, 15 g, Oral, PRN  glucagon (rDNA) injection 1 mg, 1 mg, IntraMUSCular, PRN  dextrose 5 % solution, 100 mL/hr, IntraVENous, PRN  dextrose bolus (hypoglycemia) 10% 125 mL, 125 mL, IntraVENous, PRN **OR** dextrose bolus (hypoglycemia) 10% 250 mL, 250 mL, IntraVENous, PRN     This is day 44 of MSSA therapy overall, but POD 34 for the foot surgery. Allergies: Januvia [sitagliptin], Metformin and related, Vancomycin, and Mustard oil [allyl isothiocyanate]    Pertinent items from the review of systems are discussed in the HPI; the remainder of the ROS was reviewed and is negative.      Objective:     Vital signs over the last 24 hours:  Temp  Av.9 °F (36.6 °C)  Min: 97.7 °F (36.5 °C)  Max: 98 °F (36.7 °C)  Pulse  Av.1  Min: 77  Max: 96  Systolic (14PRJ), OHO:449 , Min:136 , WFW:933   Diastolic (34PJA), CDT:37, Min:77, Max:96  Resp  Av.2  Min: 16  Max: 20  SpO2  Av.5 %  Min: 95 %  Max: 99 %    Constitutional:  well-developed, well-nourished, not quite as weak and fatigued, not acutely ill appearing  Neuropsych: more awake, alert, interactive, not anxious or agitated  Eyes:  pupils equal, round, reactive to light; sclerae anicteric, conjunctivae pale  ENT:  atraumatic; no labial ulcers; no thrush; NGT out; mucous membranes moist  Resp:  decreased at the bases, clearer overall  Cardiovascular:  heart regular, less tachy, no murmur; generally mild extremity edema; right PICC in place, and a right IJ HD line, exit sites benign (right IJ dressing off)  GI:  abdomen soft, less tenderness, less distended, hypoactive bowel sounds, no palpable masses or organomegaly; rectal tube in place, with some ongoing diarrhea; on the abdomen, surgical site looks good, right sided MAX drain with serosanguinous fluid, left sided J-tube in place  : Munoz in place, increasing amount of clearer yellow urine now  Musculoskeletal:  no clubbing, cyanosis or petechiae; extremities with no gross effusions or acute arthritis  Skin: warm, dry, normal turgor; no acute rash.     Wounds: scalp is basically healed; sacrum I didn't examine today, but see photo from [place]; left hand smaller, pink-red, granular, nearly healed; left foot about the same size, slowly more granulation central and lateral, with a bit more tendon coverage (though one focus still has a bit of sloughy tendon tissue), and then medially there's still at least that one cuneiform that's probably dysvascular, slightly off-white, not granulating over, but no pus.   ______________________________    Recent Labs     03/07/22  0405 03/06/22  1400 03/06/22  0530 03/05/22  0655 03/05/22  0655   WBC 5.8  --  6.9  --  7.7   HGB 7.5* 7.5* 6.6*   < > 7.4*   HCT 21.4* 21.8* 19.6*   < > 22.2*   MCV 89.0  --  89.9  --  89.3     --  258  --  246    < > = values in this interval not displayed. Lab Results   Component Value Date    CREATININE 3.0 (H) 03/07/2022     Lab Results   Component Value Date    LABALBU 2.4 (L) 03/07/2022     Lab Results   Component Value Date    ALT <5 (L) 02/10/2022    AST 8 (L) 02/10/2022     (H) 01/31/2022    ALKPHOS 217 (H) 02/10/2022    BILITOT 0.5 02/10/2022      Lab Results   Component Value Date    LABA1C 10.6 01/23/2022     Other recent pertinent labs: Anion gap 13. Glucoses 100s - 200s. WBC diff with some lymphopenia.  ______________________________    Recent pertinent micro results:  Nothing new the last couple of weeks. ______________________________    Recent imaging results (last 7 days):     FL UGI    Result Date: 3/3/2022  No evidence of duodenal perforation or leak following recent surgery.       Assessment:     Patient Active Problem List   Diagnosis Code    Hypertension I10    Uncontrolled type 2 diabetes mellitus with diabetic polyneuropathy (HCC) E11.42, E11.65    Cardiomyopathy ([facility] I42.9    Mixed hyperlipidemia E78.2    Allergic rhinitis J30.9    Osteoarthritis M19.90    Acute osteomyelitis of left foot ([facility] M86.172    Acute renal failure (HCC) N17.9    MSSA bacteremia R78.81, B95.61    Third degree burn of left hand T23.302A    Cardiopulmonary arrest (HCC) I46.9    Hypertriglyceridemia E78.1    Staphylococcal arthritis of left foot ([facility]) M00.072    Antibiotic-associated diarrhea K52.1, T36.95XA    Enterococcal infection A49.1    Acute osteomyelitis of left hand ([facility]) M86.142    Pressure injury of deep tissue of sacral region L89.156    Severe protein-calorie malnutrition ([facility]) E43    Paroxysmal atrial fibrillation ([facility]) I48.0    Upper GI bleeding K92.2    Duodenal ulcer K26.9    Diabetic ulcer of left midfoot associated with type 2 diabetes mellitus, with necrosis of bone ([facility]) E11.621, L97.424    Probable abscess of upper lobe of right lung without pneumonia ([facility]) J85.2    Gastrointestinal hemorrhage K92.2    Abnormal CXR R93.89    ABLA (acute blood loss anemia) D62     Assessment of today's active condition(s):      --          Background of uncontrolled DM2, neuropathy, no known PAD, multiple prior diabetic foot ulcers, infections, surgeries. Most recent wounds were at the left 1st and 2nd ray, but they had been healed for just about a year.      --          Admission this time with septic shock related primarily to deep MSSA foot infection (cellulitis, abscess, septic arthritis, acute osteo), with acute renal failure, lactic acidosis, then cardiac arrest, resuscitation, acute respiratory failure, rapid Afib. Shock resolved, respiratory failure resolved (I think perhaps mostly due to CHF / fluid overload after cardiac arrest).  Initial sepsis finally resolved, after aggressive OR treatment of left foot and left hand infections. Left foot wound overall doing better, but still some necrotic deep soft tissue and bone - I think a bit more work is needed, beyond what I can safely do at bedside. Planned for tomorrow I believe.      --          ARF definitely slowly improving now.      --          Third degree burn of left hand, with MSSA-Enterococcal abscess, tendosynovitis, presumed acute osteo, but that infection is now thought to be resolved, wound almost healed.     --          Resolved encephalopathy, likely multifactorial (cardiac arrest, ICU stay, sedatives, sepsis, renal failure, etc). Also wondering if he might not have a significant degree of critical-illness myopathy and/or neuropathy. Peripheral strength does seem better than last week, at least distal UE's.      --          Colonization with Candida, not surprising given DM, prior steroids, extensive Abx Rx.      --          Unstageable pressure ulcer of sacrum (scalp basically healed) -- conservative Rx for now, with [facility] - looking a bit better each week.      --          About 3-4 weeks ago a setback with difficulty in clearing secretions, hypoxemia, worsening mental status, rapid Afib, reintubation. I think this was more of an issue of retained secretions and perhaps aspiration pneumonitis, as opposed to a true invasive pneumonia. Respiratory function seemed to improve quickly after bronch and supportive care, nothing clearly new on CXR, FIO2 has come down, WBC quickly back down to normal, and nothing on bronch wash / BAL. Now extubated, and looking better than he has recently. Off O2 now.     --          He does look to have a small RUL lung abscess on recent CXR and then CT scan. Could have been a septic embolic process that then cavitated, or could have been an abscess that resulted from that episode of probable aspiration a few weeks ago.      --          Persistent / recurrent anemia, heme (+), large DU found at EGD. Still requiring PRBCs at times. Failed to stop bleeding despite endoscopic and IR treatment, so now post-op from a DU oversew, pyloroplasty, J-tube placement.      --          Multifactorial diarrhea (antibiotics, tube feeds, GI bleed. ...). Negative for Cdiff a few weeks ago.  I think improving a bit last week, perhaps with treatment of the GI bleed.      --          Recent reactive leukocytosis and

## 2022-03-07 NOTE — FLOWSHEET NOTE
03/07/22 1330   Negative Pressure Wound Therapy Foot Left;Dorsal   Placement Date/Time: 02/02/22 1100   Pre-existing: No  Inserted by: Yury Bush CWOCN  Location: Foot  Wound Location Orientation: Left;Dorsal   $ Standard NPWT >50 sq cm PER TX $ Yes   Wound Type Surgical   Unit Type Vac Ulta with Veraflo   Dressing Type Black foam  (veraflo)   Number of pieces used 1   Cycle Continuous   Target Pressure (mmHg) 125   Intensity 1   Irrigation Solution Sodium chloride 0.9%   Instillation Volume  14 mL   Soak Time  3 minutes   Vac Frequency 2 hours   Canister changed? No   Dressing Status Changed   Dressing Changed Changed/New   Left dorsal foot:  100% red moist tissue with exposed moist tendon and gray bone. Edges are regular and attached except for from 7-8 o'clock. Plan for OR tomorrow for bone debridement per Dr Jennifer Anaya at 1130 am  VAC dressing:  Periwound skin prepped with barrier wipe and vac drape. One black foam used in wound bed. Good seal obtained. Next vac dressing to be determined by OR and d/c plan tomorrow.

## 2022-03-07 NOTE — PROGRESS NOTES
[facility] GENERAL SURGERY DAILY PROGRESS NOTE    SUBJECTIVE: Awake, alert    OBJECTIVE: CURRENT VITALS:  BP (!) 161/96   Pulse 86   Temp 97.7 °F (36.5 °C) (Oral)   Resp 20   Ht 6' 1\" (1.854 m)   Wt 253 lb 1.4 oz (114.8 kg)   SpO2 99%   BMI 33.39 kg/m²          ABD: Soft. INCISION:  C/D/I  MAX drainage non bilious. LABS:    CBC: Recent Labs     03/05/22  0655 03/05/22  0655 03/06/22  0530 03/06/22  1400 03/07/22  0405   WBC 7.7  --  6.9  --  5.8   RBC 2.49*  --  2.17*  --  2.41*   HGB 7.4*   < > 6.6* 7.5* 7.5*   HCT 22.2*   < > 19.6* 21.8* 21.4*   MCV 89.3  --  89.9  --  89.0   RDW 16.1*  --  16.6*  --  15.8*     --  258  --  223    < > = values in this interval not displayed.      BMP:   Recent Labs     03/05/22  0655 03/06/22  0530 03/07/22  0405    137 137   K 3.6 3.6 3.6    101 102   CO2 23 23 22   PHOS 5.8* 5.1* 4.1   BUN 41* 40* 38*   CREATININE 3.4* 3.2* 3.0*         ASSESSMENT:   POD 8 oversew bleeding DU      PLAN:   Encourage PO  J TF  PT/OT         Jean Yanez MD

## 2022-03-07 NOTE — PROGRESS NOTES
Perfect serve sent to Dr. Sherly Dolan    \"FYI - Trop . 10 x 3, no further NC/o chest pain this shift\"

## 2022-03-07 NOTE — PROGRESS NOTES
Patient was complaining of chest pain, states it was sharp and radiating to back, stated it wasn't gas or reflux. Stat EKG normal sinus. But stat troponin was elevated at 0.1 Patent stated he was nauseas and thought he was going to throw up, he then stated he pooped and all the pain and nausea went away. Vital signs stable. FYI sent to MD. Patient is resting peacefully and comfortably. Report handed off to Charissa Harper [facility] and care transferred.

## 2022-03-07 NOTE — FLOWSHEET NOTE
Follow-up visit, pt busy with care at the time of visit. Mother present. Spoke to pt's mother who talked to me about pt's health condition, said pt will be going for a surgery tomorrow, 3/8/22. Pt is  and has a child. She said that pt's spouse comes to visit pt every day but had to go to work today as she is the only one working at this time.  Prayer was said with her outside of room as pt was being cared for by medical team. Will continue to follow up [facility]

## 2022-03-07 NOTE — PROGRESS NOTES
[facility] Progress Note           Subjective/   42y.o. year old male who we are seeing in consultation for RODRICK requiring HD. HPI:  Last HD on 3/1 with 1.7 liters removed. Renal function slowly improving off HD,   urine output not all charted    ROS:  +weak, intake improving, denies any shortness of breath. Objective/   GEN:  Chronically ill, BP (!) 161/96   Pulse 86   Temp 97.7 °F (36.5 °C) (Oral)   Resp 20   Ht 6' 1\" (1.854 m)   Wt 253 lb 1.4 oz (114.8 kg)   SpO2 99%   BMI 33.39 kg/m²   HEENT: non-icteric, no JVD  CV: S1, S2 without m/r/g; no LE edema  RESP: CTA B without w/r/r; breathing wnl  ABD: +bs, soft, nt, no hsm  SKIN: warm, no rashes  ACCESS: R  vascat    Data/  Recent Labs     03/05/22  0655 03/05/22  0655 03/06/22  0530 03/06/22  1400 03/07/22  0405   WBC 7.7  --  6.9  --  5.8   HGB 7.4*   < > 6.6* 7.5* 7.5*   HCT 22.2*   < > 19.6* 21.8* 21.4*   MCV 89.3  --  89.9  --  89.0     --  258  --  223    < > = values in this interval not displayed.      Recent Labs     03/05/22  0655 03/06/22  0530 03/07/22  0405    137 137   K 3.6 3.6 3.6    101 102   CO2 23 23 22   GLUCOSE 148* 170* 176*   PHOS 5.8* 5.1* 4.1   BUN 41* 40* 38*   CREATININE 3.4* 3.2* 3.0*   LABGLOM 20* 21* 23*   GFRAA 24* 26* 28*       Assessment/     -RODRICK likely related to prerenal factors in the setting of sepsis, use of diuretics and losartan contributing to multifactorial ATN. Tharon Semmes is not suggestive of staph associated glomerulonephritis.  Patient did not respond to IV fluids and developed acute pulmonary edema and renal replacement therapy initiated on 1/23/22        Now had dialysis dependence x 4 weeks, making some urine but limited recovery         On TTS schedule but missed on Saturday due to other medical issues      -Acute pulmonary edema            Resolved with dialysis / now looks euvolemic      -S/P Cardio respiratory arrest             Prolonged hospital stay       -Sepsis with MSSA bacteremia with left foot abscess s/p drainage, osteomyelitis, left hand I&D            Low-grade fever on 2/19     -Anemia - prn prbc's           1 on 2/28 and 1 on 3/1 and 1 on 3/6     -Diabetes, uncontrolled     -Hypertension     -Weakness:  Prolonged ICU stay following MSSA bacteremia complicated by cardiac arrest.  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  s/p IR embolization of the gastroduodenal artery  On 2/25/22.  CT angiogram with no active bleeding. OR w exp lap and oversew of DU, pyloroplasty on 2/27/22   S/p multiple units of blood transfusion ; unremarkable upper GI series on 3/3       Plan/     - Holding dialysis - last on 3/1 - monitoring daily  - Neurontin adjusted renally   - Trend labs, bp's, & urine output    ____________________________________  Tessa Meza MD  [facility]  [email or web address]  Office: [phone]

## 2022-03-07 NOTE — PROGRESS NOTES
Pt A&O x 4 this evening, VSS. Shift assessment complete and all meds given per MAR. , insulin coverage provided. MAX drain emptied (see flow sheet), Munoz intact, Rectal tube intact. Wound Vac WNL. Beverage and snack offered. Bed in lowest position, and call light within reach. Pt denies any further needs. Will continue to monitor.
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
## 2022-03-07 NOTE — PROGRESS NOTES
Troponin lab collected and sent to lab, pt rating pain 8/10 in back, hips, neck, and spine, PRN pain medication given. Pt repositioned. Wound on Sacrum cleansed. MAX drain emptied (see flow sheet). Antibiotic infusing into left PICC line. Water provided, fan turned off, blinds opened, room temp adjusted. Pt denies any further needs. Will continue to monitor.

## 2022-03-07 NOTE — PROGRESS NOTES
PRN pain medication given for pain 6/10, all other meds given. Pt A&O x 4, Vitals stable. Pt denied any further needs. Will continue to monitor.

## 2022-03-07 NOTE — PROGRESS NOTES
Hospitalist Progress Note      PCP: Gabriela Cuellar MD    Date of Admission: 1/22/2022     Sepsis sec to diabetic foot infection, bacteremia > cardiac arrest , resp failure requiring vent, renal failure requiring HD   Extubated 2/16. GI bleed from duodenal ulcer - sp oversew of duodenal ulcer, pyloroplasty and feeding jejunostomy 2/27    3/1 - weaned off O2, stable on RA. Started J tube feeds  Urine output picking up     3/3- transferred  to PCU. Subjective:      He received one unit of PRBC on 3/6/22. Patient complains of persistent back pain secondary to a sacral decubitus ulcers. Getting IV Dilaudid. Urine output has picked up, dialysis on hold. No distress. Vital signs stable  Tolerating  full liquids  Podiatry wants to take to the OR for a minor debridement.      Medications:  Reviewed    Infusion Medications    sodium chloride      sodium chloride      sodium chloride      sodium chloride      sodium chloride 25 mL (03/06/22 0008)    dextrose       Scheduled Medications    insulin lispro  0-18 Units SubCUTAneous 4x Daily AC & HS    PARoxetine  10 mg Oral QAM    traZODone  50 mg Oral Nightly    gabapentin  300 mg Oral TID    tiZANidine  8 mg Oral BID    nafcillin  2,000 mg IntraVENous Q4H    epoetin ang[place]-epbx  10,000 Units IntraVENous Q MWF    pantoprazole  40 mg IntraVENous BID    sucralfate  1 g Oral 4x Daily AC & HS    dilTIAZem  30 mg Oral 4 times per day    b complex-C-folic acid  1 capsule Oral Daily    carvedilol  12.5 mg Oral BID WC    povidone-iodine   Topical Daily    [Held by provider] heparin (porcine)  5,000 Units SubCUTAneous 3 times per day    sodium chloride flush  5-40 mL IntraVENous 2 times per day     PRN Meds: sodium chloride, sodium chloride, sodium chloride, HYDROmorphone, ondansetron, oxyCODONE-acetaminophen, heparin (porcine), sodium chloride, albumin human, heparin (porcine), sodium chloride flush, sodium chloride, acetaminophen **OR** acetaminophen, glucose, glucagon (rDNA), dextrose, dextrose bolus (hypoglycemia) **OR** dextrose bolus (hypoglycemia)      Intake/Output Summary (Last 24 hours) at 3/7/2022 0802  Last data filed at 3/7/2022 0442  Gross per 24 hour   Intake 420 ml   Output 1010 ml   Net -590 ml       Physical Exam Performed:    BP (!) 155/83   Pulse 91   Temp 97.7 °F (36.5 °C) (Oral)   Resp 16   Ht 6' 1\" (1.854 m)   Wt 253 lb 1.4 oz (114.8 kg)   SpO2 98%   BMI 33.39 kg/m²     Gen: middle aged male up in bed, currently resting. In no distress  Alert and fully oriented. Chronically ill-appearing, fatigued. Eyes: PERRL. No sclera icterus. No conjunctival injection. Neck: Trachea midline. Resp: No accessory muscle use.  Diminished breath sounds, no  crackles. No wheezes. No rhonchi. CV: Regular rate. Regular rhythm. No murmur or rub. No edema. GI: Non-tender. Non-distended. Midline surgical scar + abd drain and MAX drain noted   No masses. No organomegaly. Normal bowel sounds. No hernia. Skin:  wound to left hand dressing dry ,   M/S:  left foot wound dressing dry, wound vac in place . S,p right great toe amputation  Neuro: Awake, alert with diffuse muscle weakness in all groups  no focal signs    Labs:   Recent Labs     03/05/22  0655 03/05/22  0655 03/06/22  0530 03/06/22  1400 03/07/22  0405   WBC 7.7  --  6.9  --  5.8   HGB 7.4*   < > 6.6* 7.5* 7.5*   HCT 22.2*   < > 19.6* 21.8* 21.4*     --  258  --  223    < > = values in this interval not displayed. Recent Labs     03/05/22  0655 03/06/22  0530 03/07/22  0405    137 137   K 3.6 3.6 3.6    101 102   CO2 23 23 22   BUN 41* 40* 38*   CREATININE 3.4* 3.2* 3.0*   CALCIUM 8.1* 8.0* 7.7*   PHOS 5.8* 5.1* 4.1     No results for input(s): AST, ALT, BILIDIR, BILITOT, ALKPHOS in the last 72 hours. No results for input(s): INR in the last 72 hours.   Recent Labs     03/06/22  1855 03/06/22  2315 03/07/22  0405   TROPONINI 0.10* 0.10* 0.10* Urinalysis:      Lab Results   Component Value Date    NITRU Negative 02/06/2022    WBCUA 21-50 02/06/2022    BACTERIA Rare 01/22/2022    RBCUA >100 02/06/2022    BLOODU LARGE 02/06/2022    SPECGRAV 1.025 02/06/2022    GLUCOSEU 100 02/06/2022       Radiology:  FL UGI   Final Result   No evidence of duodenal perforation or leak following recent surgery. CTA ABDOMEN W WO CONTRAST   Final Result   Addendum 2 of 2   ADDENDUM:   There is a curvilinear area of arterial phase enhancement along the   posterolateral proximal duodenal wall (images 70 1-76) as well as pooling    of   contrast material in this location and slightly cranial to it on delayed   phase images (image 70-72), findings which raise concern for residual or   recurrent bleed within the location of the proximal duodenum. This is in   proximity to the metallic gastroduodenal artery embolization coils. No   gastric or duodenal ulcer or wall thickening is apparent. ADDENDUM:   Three-dimensional image post processing was performed at a remote    workstation. Final   Wide patency of the mesenteric arteries and veins. No pseudoaneurysm, active   bleed, or significant mesenteric artery occlusive disease. Interval coil   embolization of the gastroduodenal artery. No acute or significant intestinal ischemia. Cholelithiasis. XR ABDOMEN (KUB) (SINGLE AP VIEW)   Final Result   Small amount of contrast in the colon, similar to CT of 02/25/2022. RECOMMENDATION:   If upper gastrointestinal hemorrhage is suspected, proceed with CTA. If   lower gastrointestinal hemorrhage is suspected, there would likely be   limitation on CTA related to contrast in the colon; proceed with hesitation,   preferably delay study. IR ANGIOGRAM SUPERIOR MESENTERIC   Final Result   No active extravasation or pseudoaneurysm formation. Successful coil embolization of the gastroduodenal artery.          CTA ABDOMEN PELVIS [place] CONTRAST   Final Result   Poor opacification of the abdominal aorta and its branches. No evidence of active GI bleed on this study, though study is limited   secondary to presence of contrast and other radiopaque material within the   large bowel. RECOMMENDATIONS:   Unavailable         CT CHEST WO CONTRAST   Final Result   1. Right upper lobe cavitary airspace disease concerning for pneumonia. Recommend CT of the chest with contrast in 8 weeks to confirm resolution. 2. Nasogastric tube terminating in the proximal gastric body should be   advanced approximately 5 cm. 3. Small left pleural effusion with atelectasis         FL MODIFIED BARIUM SWALLOW W VIDEO   Final Result   Swallowing mechanism grossly within normal limits without evidence of   aspiration. Please see separate speech pathology report for full discussion of findings   and recommendations. XR CHEST 1 VIEW   Final Result   Bilateral airspace disease greater left parahilar and infrahilar primary   concern is pneumonia. Rounded thick-walled lucent lesion in the right mid lung possible focal   abscess. As above, other considerations include a pulmonary bleb or   pneumatocele with inflammatory margins and unlikely necrotic neoplasm. .      CT scan with contrast recommended. XR CHEST 1 VIEW   Final Result   ET, NG, and 2 central venous catheters are stable. Left greater than right basilar opacification is redemonstrated. Lungs are   hypoinflated. XR CHEST 1 VIEW   Final Result   Low volume study with diffuse bilateral opacity, increased at the left base,   for which some considerations include edema, pneumonia, and atelectasis. XR CHEST PORTABLE   Final Result   Perihilar opacities in the left lung worse than right are redemonstrated. May be developing a pneumatocele in the right mid chest.      Endotracheal tube and nasogastric tube are acceptable.          XR CHEST PORTABLE   Final Result Endotracheal tube and nasogastric tube have been removed. New right jugular   catheter with the distal tip is poorly defined. May be over the inferior   right atrium and consider repeat study to better assess the tip. Patchy opacities in the left lung more than right are redemonstrated. IR NONTUNNELED VASCULAR CATHETER > 5 YEARS   Final Result   Status post successful ultrasound/fluoroscopically guided placement of right   internal jugular temporary dialysis catheter as described above. XR CHEST PORTABLE   Final Result   Low volume study with no significant interval change in diffuse bilateral   opacity which can reflect pulmonary edema or pneumonia. XR CHEST PORTABLE   Final Result   Interval decrease in left-sided pleural effusion. IR PICC WO SQ PORT/PUMP > 5 YEARS   Final Result   Successful placement of PICC line. XR CHEST PORTABLE   Final Result   1. Unchanged position of support devices. 2. No significant change in bilateral airspace disease. XR CHEST PORTABLE   Final Result   Improvement of the previous seen left upper lobe airspace disease. Lines and tubes stable. XR CHEST PORTABLE   Final Result      1. Endotracheal tube 5 cm above the ariadna an NG tube extends into the mid   to distal stomach. The right internal jugular central venous line is   unchanged. 2.  Opacity and volume loss of the left hemithorax which has worsened   suggesting pneumonia a possibly some atelectasis. Ovoid area of opacity in   the right middle lower lung field suggesting additional area of pneumonitis. 3.  Possible left pleural effusion. 4.  Cardiomegaly, unchanged. XR CHEST PORTABLE   Final Result   Stable examination with layering left pleural effusion and right perihilar   airspace disease. Pulmonary edema and pneumonia are in the differential.         MRI HAND LEFT WO CONTRAST   Final Result   1.  Osteomyelitis of the 3rd suggestive of osteomyelitis in the   setting of soft tissue infection. Underlying Charcot arthropathy also   present. XR CHEST PORTABLE   Final Result   Cardiomegaly with left basilar effusion and bibasilar infiltrates. Stable support tubes. XR CHEST PORTABLE   Final Result   1. Stable lines, tubes, and support devices. 2. Bilateral airspace opacities with pleural effusions, left greater than   right. 3. Cardiomegaly. XR CHEST PORTABLE   Final Result   1. Stable lines, tubes, and support devices. 2. Stable cardiopulmonary status after accounting for differences in patient   positioning including bilateral airspace opacities. 3. Cardiomegaly. 4. Low lung volumes. CT HEAD WO CONTRAST   Final Result   1. No acute intracranial abnormality. XR CHEST PORTABLE   Final Result   CHF with increasing pulmonary edema. XR CHEST PORTABLE   Final Result   Patchy airspace disease, left greater than right which may represent   atelectasis or pneumonia. XR CHEST PORTABLE   Final Result   Stable support apparatus. Increasing bilateral airspace opacities which may be related to edema and/or   pneumonia. XR CHEST PORTABLE   Final Result   Low lung volumes/poor inspiratory effort limiting the study. No significant improvement. A mild cardiomegaly. Mild congestion and/or   infiltrates identified in the lungs. No pneumothorax. XR FOOT LEFT (2 VIEWS)   Final Result   1. Remote history of amputation at the 1st and 2nd digits. No evidence of   osteomyelitis at prior resection site. 2. Subtle erosions at the 3rd MTP joint are new. This is adjacent to soft   tissue swelling at the prior amputation site. A new focus of osteomyelitis   is suspected. 3. Soft tissue swelling and questionable subcutaneous gas along the lateral   aspect of the left foot.   There is also severe disorganization of bone at the   2nd through 5th tarsometatarsal joints. Although pattern may represent   Charcot arthropathy due to the more diffuse appearance, areas of   osteomyelitis cannot be excluded with plain film. Correlate with physical   exam and clinical workup. A follow-up MRI may be helpful for further   evaluation. XR CHEST PORTABLE   Final Result   Low lung volumes with cardiomegaly and vascular congestion, as well as patchy   airspace disease bilaterally, similar to the previous exam.         XR CHEST PORTABLE   Final Result   Status post advancement of right internal jugular central line with distal   tip now visualized near region of junction of superior vena cava and right   atrium. No evidence of pneumothorax. XR CHEST PORTABLE   Final Result   Improved lung volumes. Bilateral perihilar opacification, edema versus   infiltrate. Satisfactory position of endotracheal tube. Central line tip in either the   distal brachiocephalic vein or proximal SVC. XR CHEST PORTABLE   Final Result   Cardiomegaly with left mid and lower lung infiltrates. Support tubes as described above. XR CHEST 1 VIEW   Final Result   Limited chest as outlined above. Bilateral perihilar opacification, edema   versus infiltrate. XR CHEST PORTABLE   Final Result   Low lung volumes. No acute cardiopulmonary disease. Assessment/Plan:    # MSSA bacteremia  # MSSA Left foot abscess, osteomyelitis   #Septic shock - recurrent.   -Recurrent diabetic ulcers with uncontrolled DM  -Presented with severe sepsis from MSSA foot abscess and MSSA bacteremia  - ID and podiatry consulted. - S/P I and D of abscess on 1/24; cx Positive for Staph aureus.  MSSA .   MRI with large fluid collection and changes c/w osteomyelitis, s/p debridement by Dr. [employer] on 2/1/22  Rene Tang now has a wound VAC. abx as below  - Echocardiogram reviewed. EF is 35% with no vegetations.   - He was initially treated with  Ancef. Antibiotics changed to IV cefepime and once diet is advanced     # Anemia - recurrent acute blood loss anemia   # GI bleed  # Gastric and Duodenal ulcer  #Bleeding duodenal ulcer - per EGD 2/27/22  - S/p multiple PRBC transfusions. Patient has required 15 units PRBC so far . Hemoglobin mostly stable since surgery. Total of 16 units of PRBC this admission . Repeat H&H due  Continue to monitor H&H closely  - S/p embolization of gastroduodenal artery by IR 2/25 with continued blood loss  - surgery consulted. 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

## 2022-03-08 NOTE — PROGRESS NOTES
Shift assessment complete- see flow sheet. Patient is A/Ox4. VSS. Currently on room air, SpO2 WNL. Lung sounds with some fine crackels in L lung. Denies CP or shortness of breath. Pt c/o nausea. Too soon for next dose of Zofran. NPO for wound debridement at 1130. Will give daily meds when he returns. Patient denies any further needs. Call light explained and in reach. Bed alarm on. Will continue to monitor.
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
## 2022-03-08 NOTE — PROGRESS NOTES
[facility] Progress Note           Subjective/   42y.o. year old male who we are seeing in consultation for RODRICK requiring HD. HPI:  Renal function slowly improving off HD,       ROS:  +weak, intake improving, denies any shortness of breath. Objective/   GEN:  Chronically ill, BP (!) 153/80   Pulse 79   Temp 98.1 °F (36.7 °C) (Oral)   Resp 20   Ht 6' 1\" (1.854 m)   Wt 239 lb 12.8 oz (108.8 kg)   SpO2 97%   BMI 31.64 kg/m²   HEENT: non-icteric, no JVD  CV: S1, S2 without m/r/g; no LE edema  RESP: CTA B without w/r/r; breathing wnl  ABD: +bs, soft, nt, no hsm  SKIN: warm, no rashes  ACCESS: R IJ vascath    Data/  Recent Labs     03/06/22  0530 03/06/22  0530 03/06/22  1400 03/07/22  0405 03/08/22  0440   WBC 6.9  --   --  5.8 5.2   HGB 6.6*   < > 7.5* 7.5* 7.6*   HCT 19.6*   < > 21.8* 21.4* 22.5*   MCV 89.9  --   --  89.0 89.9     --   --  223 233    < > = values in this interval not displayed.      Recent Labs     03/06/22  0530 03/07/22  0405 03/08/22  0440    137 137   K 3.6 3.6 3.7    102 102   CO2 23 22 22   GLUCOSE 170* 176* 199*   PHOS 5.1* 4.1 3.8   BUN 40* 38* 38*   CREATININE 3.2* 3.0* 2.9*   LABGLOM 21* 23* 24*   GFRAA 26* 28* 29*       Assessment/     -RODRICK likely related to prerenal factors in the setting of sepsis, use of diuretics and losartan contributing to multifactorial ATN. Daniel Us is not suggestive of staph associated glomerulonephritis.  Patient did not respond to IV fluids and developed acute pulmonary edema and renal replacement therapy initiated on 1/23/22; last HD was on 3/1        Recovering renal function      -Acute pulmonary edema            Resolved with dialysis / now looks euvolemic      -S/P Cardio respiratory arrest             Prolonged hospital stay       -Sepsis with MSSA bacteremia with left foot abscess s/p drainage, osteomyelitis, left hand I&D            Status post debridement on 3/8     -Anemia - prn prbc's           1 on 2/28 and 1 on 3/1 and 1 on 3/6     -Diabetes, uncontrolled     -Hypertension     -Weakness:  Prolonged ICU stay following MSSA bacteremia complicated by cardiac arrest.  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  s/p IR embolization of the gastroduodenal artery  On 2/25/22.  CT angiogram with no active bleeding. OR w exp lap and oversew of DU, pyloroplasty on 2/27/22   S/p multiple units of blood transfusion ; unremarkable upper GI series on 3/3       Plan/     -Renal function continues to improve;monitor labs and clinical parameters  - Neurontin adjusted renally   - Trend labs, bp's, & urine output    ____________________________________  Subha Denney MD  [facility]  [email or web address]  Office: [phone]

## 2022-03-08 NOTE — PROGRESS NOTES
General Surgery - Gita Dang , APRN - CNP, CNP  Daily Progress Note    Pt Name: Stephanie Mckeon  Medical Record Number: [id number]  Date of Birth 1975   Today's Date: 3/8/2022    ASSESSMENT  1. POD #9 ex lap, oversew of duodenal ulcer, pyloroplasty, J tube insertion  2. ABD: obese, soft, no N/V, incisional tenderness, staples look good, MAX drain in place, J-tube in place, rectal tube in place: brown liquid stool, pt with nausea when turned, no V, pt reports he is tolerating a diet  3. Leuks normal  4. RODRICK: Off HD since 3/1, Cr 2.9  5. Acute blood loss anemia: stable 7.6/22/5  6. MAX in place 180 ml out: serosanguinous. 7. Rectal tube: no output recorded today  8. VSS  9. Wound VAC on left foot: going to OR today with podiatry. 10. DM uncontrolled: Hgb A1c 1/23/22: 10.6  11. PT is awake and alert. He states \"I don't know if I feel better or not. \"    PLAN  1. Soft diet from a surgical standpoint once back from foot debridement  2. J tube is clamped: flush per protocol  3. Protonix BID  4. Monitor H&H: transfuse for hgb < 7  5. PT OT  6. Pt is POD #9: pt looks good from a surgical standpoint    Gar Skip has improved from yesterday. Pain is well controlled. He has some nausea without vomiting. He has passed flatus and has had a bowel movement. He is NPO this am. Up with assistance. OBJECTIVE  VITALS:  height is 6' 1\" (1.854 m) and weight is 239 lb 12.8 oz (108.8 kg). His oral temperature is 98 °F (36.7 °C). His blood pressure is 144/78 (abnormal) and his pulse is 95. His respiration is 18 and oxygen saturation is 98%.    VITALS:  BP (!) 144/78   Pulse 95   Temp 98 °F (36.7 °C) (Oral)   Resp 18   Ht 6' 1\" (1.854 m)   Wt 239 lb 12.8 oz (108.8 kg)   SpO2 98%   BMI 31.64 kg/m²   INTAKE/OUTPUT:      Intake/Output Summary (Last 24 hours) at 3/8/2022 1613  Last data filed at 3/8/2022 1548  Gross per 24 hour   Intake 390 ml   Output 2045 ml   Net -1655 ml     URINARY CATHETER OUTPUT (Tammy):     GENERAL: alert, cooperative, no distress    I/O last 3 completed shifts:   In: 480 [P.O.:480]  Out: 2880 [Urine:2500; Drains:380]  I/O this shift:  In: 150 [I.V.:150]  Out: 1445 [Urine:1300; Drains:140; Blood:5]    LABS  Recent Labs     03/08/22  0440   WBC 5.2   HGB 7.6*   HCT 22.5*         K 3.7      CO2 22   BUN 38*   CREATININE 2.9*   PHOS 3.8   CALCIUM 7.7*     CBC with Differential:    Lab Results   Component Value Date    WBC 5.2 03/08/2022    RBC 2.51 03/08/2022    HGB 7.6 03/08/2022    HCT 22.5 03/08/2022     03/08/2022    MCV 89.9 03/08/2022    MCH 30.1 03/08/2022    MCHC 33.5 03/08/2022    RDW 16.2 03/08/2022    NRBC 1 02/17/2022    SEGSPCT 65.0 02/17/2015    BANDSPCT 3 02/01/2022    METASPCT 1 02/19/2022    LYMPHOPCT 16.5 03/08/2022    PROMYELOPCT 2 01/29/2022    MONOPCT 8.7 03/08/2022    MYELOPCT 3 02/19/2022    BASOPCT 0.9 03/08/2022    MONOSABS 0.4 03/08/2022    LYMPHSABS 0.8 03/08/2022    EOSABS 0.3 03/08/2022    BASOSABS 0.0 03/08/2022     CMP:    Lab Results   Component Value Date     03/08/2022    K 3.7 03/08/2022    K 3.7 01/23/2022     03/08/2022    CO2 22 03/08/2022    BUN 38 03/08/2022    CREATININE 2.9 03/08/2022    GFRAA 29 03/08/2022    AGRATIO 0.8 01/22/2022    LABGLOM 24 03/08/2022    LABGLOM 103 09/14/2015    GLUCOSE 199 03/08/2022    PROT 6.0 02/10/2022    LABALBU 2.5 03/08/2022    CALCIUM 7.7 03/08/2022    BILITOT 0.5 02/10/2022    ALKPHOS 217 02/10/2022    AST 8 02/10/2022    ALT <5 02/10/2022         EVANS Tatum - CNP  Electronically signed 3/8/2022 at 4:13 PM

## 2022-03-08 NOTE — BRIEF OP NOTE
Brief Postoperative Note      Patient: Teri Sandoval  YOB: 1975  MRN: [id number]    Date of Procedure: 3/8/2022    Pre-Op Diagnosis: DIABETIC FOOT ULCER, OSTEOMYELITIS    Post-Op Diagnosis: Same       Procedure(s):  ULCER DEBRIDEMENT LEFT FOOT    Surgeon(s):  Zigmund Councilman, DPM    Assistant:  Surgical Assistant: Miriam Nicole    Anesthesia: Monitor Anesthesia Care    Estimated Blood Loss (mL): less than 443     Complications: None    Specimens:   ID Type Source Tests Collected by Time Destination   A : bone from left foot Tissue Tissue SURGICAL PATHOLOGY Zigmund Councilman, DPM 3/8/2022 1152        Implants:  * No implants in log *      Drains:   Closed/Suction Drain Right Abdomen Bulb  (Active)   Dressing Status Clean;Dry; Intact 03/07/22 1539   Drainage Appearance Bright red 03/07/22 2104   Status To bulb suction 03/07/22 1539   Output (ml) 50 ml 03/08/22 1041       Negative Pressure Wound Therapy Foot Left;Dorsal (Active)   $ Standard NPWT <=50 sq cm PER TX $ Yes 02/02/22 1134   $ Standard NPWT >50 sq cm PER TX $ Yes 03/07/22 1330   Wound Type Surgical 03/07/22 1539   Unit Type Vac Ulta with Veraflo 03/07/22 1330   Dressing Type Black foam 03/07/22 1330   Number of pieces used 1 03/07/22 1330   Cycle Continuous 03/07/22 1330   Target Pressure (mmHg) 125 03/07/22 1330   Intensity 1 03/07/22 1330   Irrigation Solution Sodium chloride 0.9% 03/07/22 1330   Instillation Volume  14 mL 03/07/22 1330   Soak Time  3 minutes 03/07/22 1330   Vac Frequency 2 hours 03/07/22 1330   Canister changed? No 03/07/22 1330   Dressing Status Changed 03/07/22 1330   Dressing Changed Dressing reinforced 03/08/22 0918   Drainage Amount Scant 03/04/22 1345   Drainage Description Other (Comment) 03/04/22 1345   Dressing Change Due 03/04/22 03/02/22 1045   Output (ml) 200 ml 03/06/22 0650   Wound Assessment Granulation tissue; Red 03/04/22 1345   Shanita-wound Assessment Intact 02/14/22 1113   Odor None 03/04/22 1345 Dry;Intact 02/07/22 2000   Securement device Yes 02/07/22 2000   Status Suction-low continuous 02/08/22 0817   Placement Verified by X-Ray (repeat);by External Catheter Length 02/07/22 2000   NG/OG/NJ/NE External Measurement (cm) 65 cm 02/08/22 1644   Drainage Appearance Bile;Green 02/08/22 0817   Tube Feeding Renal Formula 02/08/22 2005   Tube Feeding Status Stopped 02/09/22 0845   Rate/Schedule 40 mL/hr 02/08/22 2117   Tube Feeding Supplement (Product) Protein Modular 02/09/22 0914   Tube Feeding Supplement Amount (mL) 75 02/09/22 0914   Tube Feeding Intake (mL) 187 ml 02/09/22 0845   Free Water Flush (mL) 60 mL 02/09/22 0914   Free Water Rate 30Q4 02/08/22 2117   Residual Volume (ml) 25 ml 02/08/22 2117   Output (mL) 50 ml 02/08/22 0400       [REMOVED] NG/OG/NJ/NE Tube Orogastric 16 fr Center mouth (Removed)   Surrounding Skin Dry; Intact 02/16/22 0400   Securement device Yes 02/13/22 0817   Status Clamped 02/13/22 0817   Placement Verified by X-Ray (Initial) 02/13/22 0817   NG/OG/NJ/NE External Measurement (cm) 70 cm 02/13/22 0817   Rate/Schedule 40 mL/hr 02/16/22 0400   Tube Feeding Intake (mL) 247 ml 02/16/22 0500   Free Water Flush (mL) 30 mL 02/15/22 0434       [REMOVED] NG/OG/NJ/NE Tube Nasogastric 16 fr Right nostril (Removed)   Surrounding Skin Dry; Intact 02/28/22 0800   Securement device Yes 02/28/22 0800   Status Clamped 02/28/22 0800   Placement Verified by External Catheter Length 02/28/22 0800   NG/OG/NJ/NE External Measurement (cm) 53 cm 02/28/22 0800   Drainage Appearance None 02/21/22 0800   Tube Feeding Other Tube Feeding (must specify product in comment) 02/20/22 2000   Tube Feeding Status Continuous 02/23/22 0625   Rate/Schedule 40 mL/hr 02/24/22 0400   Tube Feeding Intake (mL) 0 ml 02/28/22 0400   Free Water Flush (mL) 0 mL 02/28/22 0400   Residual Volume (ml) 0 ml 02/21/22 0402   Output (mL) 400 ml 03/02/22 1400       [REMOVED] Urethral Catheter Temperature probe 16 fr (Removed)   Catheter Indications Need for fluid volume management of the critically ill patient in a critical care setting 02/06/22 0800   Site Assessment Urethral drainage;[place] 02/06/22 0800   Urine Color Gita;Bloody 02/06/22 0800   Urine Appearance Cloudy 02/06/22 0800   Output (mL) 30 mL 02/06/22 0600       Findings: ulcer left foot with exposed bone and tendon    Electronically signed by Rachel Murcia DPM on 3/8/2022 at 11:56 AM

## 2022-03-08 NOTE — PROGRESS NOTES
Patient medicated per request w/ 1 percocet tablet po for complaints of neck and back pain of 6. Patient repositioned.   Call light in reach

## 2022-03-08 NOTE — ANESTHESIA PRE PROCEDURE
Department of Anesthesiology  Preprocedure Note       Name:  Alexa Ley   Age:  42 y.o.  :  1975                                          MRN:  [id number]         Date:  3/8/2022      Surgeon: Raven Hunt):  Monica Reed DPM    Procedure: Procedure(s):  ULCER DEBRIDEMENT LEFT FOOT    Medications prior to admission:   Prior to Admission medications    Medication Sig Start Date End Date Taking? Authorizing Provider   Insulin Degludec (TRESIBA FLEXTOUCH) 200 UNIT/ML SOPN INJECT 76 UNITS INTO THE SKIN NIGHTLY 22  Yes Raford Notice, MD   losartan (COZAAR) 50 MG tablet TAKE 1 TABLET BY MOUTH DAILY 22  Yes Raford Notice, MD   tiZANidine (ZANAFLEX) 4 MG tablet TAKE 2 TABLETS BY MOUTH NIGHTLY 22  Yes Abdirizakord Notice, MD   carvedilol (COREG) 12.5 MG tablet TAKE 1 TABLET BY MOUTH 2 TIMES DAILY (WITH MEALS) 21  Yes Kimmie Notice, MD   rosuvastatin (CRESTOR) 20 MG tablet TAKE 1 TABLET BY MOUTH NIGHTLY 21  Yes Francia Chen, MD   traZODone (DESYREL) 50 MG tablet TAKE 1 TABLET BY MOUTH NIGHTLY 21  Yes Kimmie Notice, MD   furosemide (LASIX) 20 MG tablet TAKE 1 TABLET BY MOUTH DAILY 21  Yes Kimmie Notice, MD   gabapentin (NEURONTIN) 400 MG capsule TAKE 2 CAPSULES BY MOUTH 3 TIMES DAILY FOR 90 DAYS. 21 Yes Kimmie Notice, MD   insulin lispro (HUMALOG KWIKPEN) 200 UNIT/ML SOPN pen Inject 25 Units into the skin 3 times daily (before meals) 10/14/21  Yes Kimmie Notice, MD   glimepiride (AMARYL) 4 MG tablet TAKE 1 TABLET BY MOUTH EVERY MORNING (BEFORE BREAKFAST). 9/10/21  Yes Kimmie Notice, MD   PARoxetine (PAXIL) 10 MG tablet TAKE 1 TABLET BY MOUTH EVERY MORNING 9/10/21  Yes Kimmie Notice, MD   blood glucose test strips (ONETOUCH ULTRA) strip USE TO TEST BLOOD GLUCOSE DAILY. DX:E11.9 3/5/21  Yes Kimmie Notice, MD   Blood Glucose Monitoring Suppl (CONTOUR NEXT EZ) w/Device KIT Use to test glucose daily. DX: E11.9 12/10/20  Yes Elda Bearden MD   Insulin Pen Needle 32G X 6 MM MISC Use with insulin daily . DX:E11.9 12/4/20  Yes Elda Baerden MD   blood glucose monitor strips Use to test blood sugar daily.   DX:E11.9 12/4/20  Yes Elda Bearden MD   oxymetazoline (AFRIN) 0.05 % nasal spray 2 sprays by Nasal route daily Indications: prior to HBO   Yes Historical Provider, MD   loratadine (CLARITIN) 10 MG tablet Take 10 mg by mouth nightly as needed    Yes Historical Provider, MD   sildenafil (VIAGRA) 100 MG tablet TAKE 1 TABLET BY MOUTH AS NEEDED FOR ERECTILE DYSFUNCTION 8/27/19  Yes Elda Bearden MD       Current medications:    Current Facility-Administered Medications   Medication Dose Route Frequency Provider Last Rate Last Admin    gabapentin (NEURONTIN) capsule 200 mg  200 mg Oral TID Sayda Grijalva MD        insulin lispro (HUMALOG) injection vial 0-18 Units  0-18 Units SubCUTAneous 4x Daily AC & HS Osito Che MD   3 Units at 03/07/22 1558    PARoxetine (PAXIL) tablet 10 mg  10 mg Oral QAM Osito Che MD   10 mg at 03/07/22 0950    traZODone (DESYREL) tablet 50 mg  50 mg Oral Nightly Osito Che MD   50 mg at 03/07/22 2053    tiZANidine (ZANAFLEX) tablet 8 mg  8 mg Oral BID Osito Che MD   8 mg at 03/07/22 2053    nafcillin 2,000 mg in dextrose 5 % 100 mL IVPB  2,000 mg IntraVENous Q4H Daniele Whitaker MD   Stopped at 03/08/22 0729    epoetin [place]-epbx (RETACRIT) injection 10,000 Units  10,000 Units IntraVENous Q MWF Benjamin Hernandez MD        pantoprazole (PROTONIX) injection 40 mg  40 mg IntraVENous BID Elda Bearden MD   40 mg at 03/08/22 0936    HYDROmorphone (DILAUDID) injection 1 mg  1 mg IntraVENous Q2H PRN Elda Bearden MD   1 mg at 03/08/22 0920    ondansetron (ZOFRAN) injection 4 mg  4 mg IntraVENous Q6H PRN Elda Bearden MD   4 mg at 03/08/22 0445    oxyCODONE-acetaminophen (PERCOCET) 5-325 MG per tablet 1 tablet  1 tablet Oral Q4H PRN Lou Gastelum MD   1 tablet at 03/07/22 2053    sucralfate (CARAFATE) tablet 1 g  1 g Oral 4x Daily AC & HS Sea Fried MD   1 g at 03/07/22 2053    dilTIAZem (CARDIZEM) tablet 30 mg  30 mg Oral 4 times per day Sea Fried MD   30 mg at 03/07/22 1809    b complex-C-folic acid (NEPHROCAPS) capsule 1 mg  1 capsule Oral Daily Sea Fried MD   1 mg at 03/07/22 0950    carvedilol (COREG) tablet 12.5 mg  12.5 mg Oral BID WC Sea Fried MD   12.5 mg at 03/07/22 1557    povidone-iodine (BETADINE) 10 % external solution   Topical Daily Lou Gastelum MD   Given at 03/08/22 5019    [Held by provider] heparin (porcine) injection 5,000 Units  5,000 Units SubCUTAneous 3 times per day Sea Fried MD   5,000 Units at 02/16/22 0555    heparin (porcine) injection 3,000 Units  3,000 Units IntraVENous PRN Lou Gastelum MD   3,000 Units at 02/04/22 1107    sodium chloride 0.9 % irrigation 1,000 mL  1,000 mL Irrigation Continuous PRN Lou Gastelum MD        albumin human 25 % IV solution 12.5 g  12.5 g IntraVENous PRN Lou Gastelum  mL/hr at 02/27/22 1852 12.5 g at 02/27/22 1852    heparin (porcine) injection 2,600 Units  2,600 Units IntraCATHeter PRN Lou Gastelum MD   2,600 Units at 03/01/22 1115    sodium chloride flush 0.9 % injection 5-40 mL  5-40 mL IntraVENous 2 times per day Lou Gastelum MD   10 mL at 03/08/22 0936    sodium chloride flush 0.9 % injection 5-40 mL  5-40 mL IntraVENous PRN Lou Gastelum MD   10 mL at 03/02/22 2103    0.9 % sodium chloride infusion  25 mL IntraVENous PRN Lou Gastelum  mL/hr at 03/06/22 0008 25 mL at 03/06/22 0008    acetaminophen (TYLENOL) tablet 650 mg  650 mg Oral Q6H PRN Lou Gastelum MD   650 mg at 02/19/22 2327    Or    acetaminophen (TYLENOL) suppository 650 mg  650 mg Rectal Q6H PRN Lou Gastelum MD        glucose (GLUTOSE) 40 % oral gel 15 g  15 g Oral PRN Rosaura Michael MD        glucagon (rDNA) injection 1 mg  1 mg IntraMUSCular PRN Rosaura Michael MD        dextrose 5 % solution  100 mL/hr IntraVENous PRN Rosaura Michael MD        dextrose bolus (hypoglycemia) 10% 125 mL  125 mL IntraVENous PRN Rosaura Michael MD        Or    dextrose bolus (hypoglycemia) 10% 250 mL  250 mL IntraVENous PRN Rosaura Michael MD           Allergies: Allergies   Allergen Reactions    Januvia [Sitagliptin] Nausea Only     Has taken metformin without side effects in the past.  Nausea with Janumet in the past.     Metformin And Related      GI Upset    Vancomycin      Pt had red face, swelling itching of eyelids, sore throat after receiving vancmomyin and cefepime. I think this was a histamine releasing reaction from vancomycin most likely.  The cefepime was switched to Zosyn and patient had no reaction to Zosyn    Mustard [employer] Isothiocyanate] Swelling and Rash       Problem List:    Patient Active Problem List   Diagnosis Code    Hypertension I10    Uncontrolled type 2 diabetes mellitus with diabetic polyneuropathy (HCC) E11.42, E11.65    Cardiomyopathy ([facility] I42.9    Mixed hyperlipidemia E78.2    Allergic rhinitis J30.9    Osteoarthritis M19.90    Acute osteomyelitis of left foot ([facility]) M86.172    Acute renal failure (HCC) N17.9    Staph aureus infection A49.01    Third degree burn of left hand T23.302A    Antibiotic-associated diarrhea K52.1, T36.95XA    Pressure injury of deep tissue of sacral region L89.156    Severe protein-calorie malnutrition (HCC) E43    Paroxysmal atrial fibrillation (HCC) I48.0    Duodenal ulcer K26.9    Diabetic ulcer of left midfoot associated with type 2 diabetes mellitus, with necrosis of bone (HCC) E11.621, L97.424    Probable abscess of upper lobe of right lung without pneumonia ([facility]) J85.2    Gastrointestinal hemorrhage K92.2    ABLA (acute blood loss anemia) D62       Past Medical History: Diagnosis Date    Abscess of left foot 1/24/2022    Abscess of left hand     Acute encephalopathy     Acute hypoxemic respiratory failure (HCC)     Acute osteomyelitis of left hand ([address] 2/7/2022    Acute osteomyelitis of right hallux ([address] 08/2019    Cardiopulmonary arrest ([address]     Cellulitis of left foot 7/26/2020    CHF (congestive heart failure) ([address] 09/2014    presumably from viral myocarditis    Chronic osteomyelitis of left foot ([address] 10/27/2020    Closed displaced fracture of distal phalanx of right little finger 4/8/2021    Clostridium difficile infection 11/01/2016    Diabetic ulcer of left forefoot associated with type 2 diabetes mellitus, with necrosis of bone ([address] 8/1/2019    Diabetic ulcer of right great toe associated with type 2 diabetes mellitus, with necrosis of bone ([address] 08/2019    Enterococcal infection 2/3/2022    Failed soft 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 toe amputation site 10/26/2020    History of hyperbaric oxygen therapy 10/28/2020    [facility]- carmichael 3 - compromised flap    Infective tenosynovitis of extensor tendons of left hand 2/3/2022    Migraine     MSSA bacteremia 1/23/2022    Possible perforated tympanic membrane     as a result of recurrent otitis    Post-op hematoma of left foot 11/12/2020    Recurrent otitis media     Septic shock (HCC)     Staphylococcal arthritis of left foot ([address] 2/1/2022    Syncope     Tear of medial meniscus of left knee     Tobacco use     Toe osteomyelitis, left ([address] 7/24/2020    VAP (ventilator-associated pneumonia) [facility])        Past Surgical History:        Procedure Laterality Date    ARM SURGERY Left 02/05/2022    LEFT HAND INCISION AND DRAINAGE performed by Med Robison MD at [address] Left 02/01/2022    ULCER DEBRIDEMENT LEFT FOOT performed by Nubia Stark DPM at [address] IR EMBOLIZATION HEMORRHAGE Right 02/25/2022    successful coil embolization of the gastroduodenal artery    IR NONTUNNELED VASCULAR CATHETER  2022    IR NONTUNNELED VASCULAR CATHETER 2022 [facility]    KNEE ARTHROSCOPY Left 08/15/2013    LEFT KNEE ARTHROSCOPY , SYNOVECTOMY       LAPAROTOMY N/A 2022    PYLOROPLASTY, ULCER OVER-SEW, JEJUNOSTOMY TUBE INSERTION performed by Corinne Milliner, MD at [facility] [address] Left 2020    PARTIAL LEFT FOOT AMPUTATION    TOE AMPUTATION Right 2019    PARTIAL RIGHT FOOT AMPUTATION performed by Charley Tay DPM at [address] TOE AMPUTATION Left 2020    PARTIAL LEFT FOOT AMPUTATION performed by Charley Tay DPM at [address] TOE AMPUTATION Left 10/22/2020    PARTIAL LEFT FOOT AMPUTATION WITH GRAFT APPLICATION performed by Charley Tay DPM at [address] [facility] ENDOSCOPY N/A 2022    EGD W/ANES.  performed by Gavin Morgan MD at [address] [facility] N/A 2022    EGD DIAGNOSTIC ONLY performed by Elissa Michael MD at [place] [address] EXTRACTION         Social History:    Social History     Tobacco Use    Smoking status: Former Smoker     Packs/day: 0.50     Years: 2.00     Pack years: 1.00     Quit date: 2005     Years since quittin.5    Smokeless tobacco: Former User     Quit date: 2005    Tobacco comment: SMOKED OCCASIONALLY UNTIL 8 YEARS AGO   Substance Use Topics    Alcohol use: Yes     Comment: RARELY                                Counseling given: Not Answered  Comment: SMOKED OCCASIONALLY UNTIL 8 YEARS AGO      Vital Signs (Current):   Vitals:    22 1936 22 0000 22 0400 22 0900   BP: 128/68 121/78 126/75 (!) 153/80   Pulse: 86 89 89 79   Resp: 20 18 16 20   Temp: 98.4 °F (36.9 °C) 98.2 °F (36.8 °C) 98.4 °F (36.9 °C) 98.1 °F (36.7 °C)   TempSrc: Oral Oral Axillary Oral   SpO2: 99% 98% 97% 97%   Weight:   239 lb 12.8 oz (108.8 kg)    Height: BP Readings from Last 3 Encounters:   03/08/22 (!) 153/80   02/27/22 123/70   02/17/22 (!) 148/95       NPO Status: Time of last liquid consumption: 0000                        Time of last solid consumption: 0000                        Date of last liquid consumption: 02/26/22                        Date of last solid food consumption: 02/26/22    BMI:   Wt Readings from Last 3 Encounters:   03/08/22 239 lb 12.8 oz (108.8 kg)   10/14/21 282 lb (127.9 kg)   03/11/21 276 lb (125.2 kg)     Body mass index is 31.64 kg/m².     CBC:   Lab Results   Component Value Date    WBC 5.2 03/08/2022    RBC 2.51 03/08/2022    HGB 7.6 03/08/2022    HCT 22.5 03/08/2022    MCV 89.9 03/08/2022    RDW 16.2 03/08/2022     03/08/2022       CMP:   Lab Results   Component Value Date     03/08/2022    K 3.7 03/08/2022    K 3.7 01/23/2022     03/08/2022    CO2 22 03/08/2022    BUN 38 03/08/2022    CREATININE 2.9 03/08/2022    GFRAA 29 03/08/2022    AGRATIO 0.8 01/22/2022    LABGLOM 24 03/08/2022    LABGLOM 103 09/14/2015    GLUCOSE 199 03/08/2022    PROT 6.0 02/10/2022    CALCIUM 7.7 03/08/2022    BILITOT 0.5 02/10/2022    ALKPHOS 217 02/10/2022    AST 8 02/10/2022    ALT <5 02/10/2022       POC Tests:   Recent Labs     03/08/22  0731   POCGLU 205*       Coags:   Lab Results   Component Value Date    PROTIME 13.4 02/26/2022    INR 1.18 02/26/2022    APTT 67.1 01/24/2022       HCG (If Applicable): No results found for: PREGTESTUR, PREGSERUM, HCG, HCGQUANT     ABGs:   Lab Results   Component Value Date    PHART 7.467 02/16/2022    PO2ART 93.6 02/16/2022    XEF4QIE 31.7 02/16/2022    NEW3EFF 22.4 02/16/2022    BEART -1.0 02/16/2022    C7WJMGGI 97.6 02/16/2022        Type & Screen (If Applicable):  No results found for: LABABO, LABRH    Drug/Infectious Status (If Applicable):  No results found for: HIV, HEPCAB    COVID-19 Screening (If Applicable):   Lab Results   Component Value Date    COVID19 Not Detected 03/08/2022           Anesthesia Evaluation  Patient summary reviewed and Nursing notes reviewed no history of anesthetic complications:   Airway: Mallampati: III     Neck ROM: full   Dental:          Pulmonary:Negative Pulmonary ROS and normal exam    (+) pneumonia:                             Cardiovascular:Negative CV ROS    (+) hypertension:, CHF:,                   Neuro/Psych:   Negative Neuro/Psych ROS  (+) neuromuscular disease (neuropathy):, headaches:,             GI/Hepatic/Renal: Neg GI/Hepatic/Renal ROS  (+) PUD,      (-) hiatal hernia and GERD       Endo/Other: Negative Endo/Other ROS   (+) Diabetes, . Abdominal:             Vascular: Other Findings:             Anesthesia Plan      general     ASA 3     (I discussed with the patient the risks and benefits of PIV, general anesthesia, IV Narcotics, PACU. All questions were answered the patient agrees with the plan and wishes to proceed.  )  Induction: intravenous. Pre-Operative Diagnosis: Hyperglycemia [R73.9]; RODRICK (acute kidney injury) ([address] [N17.9]; Acute renal failure, unspecified acute renal failure type ([address] [N17.9]; Syncope, unspecified syncope type [R55]    42 y.o.   BMI:  Body mass index is 31.64 kg/m². Vitals:    03/07/22 1936 03/08/22 0000 03/08/22 0400 03/08/22 0900   BP: 128/68 121/78 126/75 (!) 153/80   Pulse: 86 89 89 79   Resp: 20 18 16 20   Temp: 98.4 °F (36.9 °C) 98.2 °F (36.8 °C) 98.4 °F (36.9 °C) 98.1 °F (36.7 °C)   TempSrc: Oral Oral Axillary Oral   SpO2: 99% 98% 97% 97%   Weight:   239 lb 12.8 oz (108.8 kg)    Height:           Allergies   Allergen Reactions    Januvia [Sitagliptin] Nausea Only     Has taken metformin without side effects in the past.  Nausea with Janumet in the past.     Metformin And Related      GI Upset    Vancomycin      Pt had red face, swelling itching of eyelids, sore throat after receiving vancmomyin and cefepime.  I think this was a histamine releasing reaction from vancomycin most likely.  The cefepime was switched to Zosyn and patient had no reaction to Zosyn    Mustard [employer] Isothiocyanate] Swelling and Rash       Social History     Tobacco Use    Smoking status: Former Smoker     Packs/day: 0.50     Years: 2.00     Pack years: 1.00     Quit date: 2005     Years since quittin.5    Smokeless tobacco: Former User     Quit date: 2005    Tobacco comment: SMOKED OCCASIONALLY UNTIL 8 YEARS AGO   Substance Use Topics    Alcohol use: Yes     Comment: RARELY       LABS:    CBC  Lab Results   Component Value Date/Time    WBC 5.2 2022 04:40 AM    HGB 7.6 (L) 2022 04:40 AM    HCT 22.5 (L) 2022 04:40 AM     2022 04:40 AM     RENAL  Lab Results   Component Value Date/Time     2022 04:40 AM    K 3.7 2022 04:40 AM    K 3.7 2022 02:10 PM     2022 04:40 AM    CO2 22 2022 04:40 AM    BUN 38 (H) 2022 04:40 AM    CREATININE 2.9 (H) 2022 04:40 AM    GLUCOSE 199 (H) 2022 04:40 AM     COAGS  Lab Results   Component Value Date/Time    PROTIME 13.4 (H) 2022 03:40 PM    INR 1.18 (H) 2022 03:40 PM    APTT 67.1 (H) 2022 07:00 PM         Jenna Plata MD   3/8/2022

## 2022-03-08 NOTE — ANESTHESIA POSTPROCEDURE EVALUATION
Department of Anesthesiology  Postprocedure Note    Patient: Vanessa Beasley  MRN: [id number]  YOB: 1975  Date of evaluation: 3/8/2022  Time:  3:08 PM     Procedure Summary     Date: 03/08/22 Room / Location: [facility]    Anesthesia Start: 1127 Anesthesia Stop: 6754    Procedure: ULCER DEBRIDEMENT LEFT FOOT (Left Foot) Diagnosis: (DIABETIC FOOT ULCER, OSTEOMYELITIS)    Surgeons: Rachana Walker DPM Responsible Provider: Siri Connolly MD    Anesthesia Type: MAC ASA Status: 3          Anesthesia Type: MAC    Deysi Phase I: Deysi Score: 10    Deysi Phase II: Deysi Score: 10    Last vitals: Reviewed and per EMR flowsheets.        Anesthesia Post Evaluation    Comments: Postoperative Anesthesia Note    Name:    Vanessa Beasley  MRN:      [id number]    Patient Vitals in the past 12 hrs:  03/08/22 1300, BP:(!) 144/78, Temp:98 °F (36.7 °C), Temp src:Oral, Pulse:95, Resp:18, SpO2:98 %  03/08/22 1201, BP:112/69, Temp:97.2 °F (36.2 °C), Pulse:96, Resp:14, SpO2:93 %  03/08/22 0900, BP:(!) 153/80, Temp:98.1 °F (36.7 °C), Temp src:Oral, Pulse:79, Resp:20, SpO2:97 %  03/08/22 0400, BP:126/75, Temp:98.4 °F (36.9 °C), Temp src:Axillary, Pulse:89, Resp:16, SpO2:97 %, Weight:239 lb 12.8 oz (108.8 kg)     LABS:    CBC  Lab Results       Component                Value               Date/Time                  WBC                      5.2                 03/08/2022 04:40 AM        HGB                      7.6 (L)             03/08/2022 04:40 AM        HCT                      22.5 (L)            03/08/2022 04:40 AM        PLT                      233                 03/08/2022 04:40 AM   RENAL  Lab Results       Component                Value               Date/Time                  NA                       137                 03/08/2022 04:40 AM        K                        3.7                 03/08/2022 04:40 AM        K                        3.7                 01/23/2022 02:10 PM CL                       102                 03/08/2022 04:40 AM        CO2                      22                  03/08/2022 04:40 AM        BUN                      38 (H)              03/08/2022 04:40 AM        CREATININE               2.9 (H)             03/08/2022 04:40 AM        GLUCOSE                  199 (H)             03/08/2022 04:40 AM   COAGS  Lab Results       Component                Value               Date/Time                  PROTIME                  13.4 (H)            02/26/2022 03:40 PM        INR                      1.18 (H)            02/26/2022 03:40 PM        APTT                     67.1 (H)            01/24/2022 07:00 PM     Intake & Output:  @[id number]@    Nausea & Vomiting:  No    Level of Consciousness:  Awake    Pain Assessment:  Adequate analgesia    Anesthesia Complications:  No apparent anesthetic complications    SUMMARY      Vital signs stable  OK to discharge from Stage I post anesthesia care.   Care transferred from Anesthesiology department on discharge from perioperative area

## 2022-03-08 NOTE — FLOWSHEET NOTE
03/08/22 1400   Encounter Summary   Services provided to: Patient and family together   Referral/Consult From: Other    Support System Friends/neighbors   Continue Visiting   (3/8 follow up, listened and sppt)   Complexity of Encounter Moderate   Length of Encounter 15 minutes   Routine   Type Follow up   Assessment Calm; Approachable; Hopeful;Coping   Intervention Active listening;Explored feelings, thoughts, concerns; Discussed illness/injury and it's impact   Outcome Comfort;Expressed gratitude;Engaged in conversation;Expressed feelings/needs/concerns

## 2022-03-08 NOTE — PROGRESS NOTES
Pt sitting up in bed this evening ready to go to sleep. Pt A&O x 4, VSS. Pt rating pain 6/10. PRN Percocet given along with Zofran. Shift assessment complete and all meds given per MAR. Beverage and snack provided. Bed in lowest position and call light within reach. Will continue to monitor and assess.

## 2022-03-08 NOTE — PROGRESS NOTES
Occupational/Physical Therapy  Patient off floor for OR this am and unavailable for therapy. Will continue to follow.   Daxa Christensen, OTR/L [id number]  Karli Brantley, PT, DPT

## 2022-03-08 NOTE — PLAN OF CARE
behavior  Description: Absence of abusive behavior  3/7/2022 1824 by Luis Armando Meade RN  Outcome: Ongoing  Goal: Verbalizations of feeling emotionally comfortable while being cared for will increase  Description: Verbalizations of feeling emotionally comfortable while being cared for will increase  3/7/2022 1824 by Luis Armando Meade RN  Outcome: Ongoing     Problem: Psychomotor Activity - Altered:  Goal: Absence of psychomotor disturbance signs and symptoms  Description: Absence of psychomotor disturbance signs and symptoms  3/7/2022 1824 by Luis Armando Meade RN  Outcome: Ongoing     Problem: Sensory Perception - Impaired:  Goal: Demonstrations of improved sensory functioning will increase  Description: Demonstrations of improved sensory functioning will increase  3/7/2022 1824 by Luis Armando Meade RN  Outcome: Ongoing  Goal: Decrease in sensory misperception frequency  Description: Decrease in sensory misperception frequency  3/7/2022 1824 by Luis Armando Meade RN  Outcome: Ongoing  Goal: Able to refrain from responding to false sensory perceptions  Description: Able to refrain from responding to false sensory perceptions  3/7/2022 1824 by Luis Armando Meade RN  Outcome: Ongoing  Goal: Demonstrates accurate environmental perceptions  Description: Demonstrates accurate environmental perceptions  3/7/2022 1824 by Luis Armando Meade RN  Outcome: Ongoing  Goal: Able to distinguish between reality-based and nonreality-based thinking  Description: Able to distinguish between reality-based and nonreality-based thinking  3/7/2022 1824 by Luis Armando Meade RN  Outcome: Ongoing  Goal: Able to interrupt nonreality-based thinking  Description: Able to interrupt nonreality-based thinking  3/7/2022 1824 by Luis Armando Meade RN  Outcome: Ongoing     Problem: Sleep Pattern Disturbance:  Goal: Appears well-rested  Description: Appears well-rested  3/7/2022 1824 by Luis Armando Meade RN  Outcome: Ongoing     Problem: Nutrition  Goal: Optimal nutrition therapy  3/7/2022 1824 by Carlos Alberto Oneal RN  Outcome: Ongoing  3/7/2022 1536 by Claudio Willingham RD, LD  Outcome: Ongoing  Goal: Understanding of nutritional guidelines  3/7/2022 1824 by Carlos Alberto Oneal RN  Outcome: Ongoing  3/7/2022 1536 by Claudio Willingham RD, OSVALDO  Outcome: Ongoing     Problem: Coping:  Goal: Ability to cope will improve  Description: Ability to cope will improve  3/7/2022 1824 by Carlos Alberto Oneal RN  Outcome: Ongoing     Problem: Pain:  Goal: Pain level will decrease  Description: Pain level will decrease  3/7/2022 1824 by Carlos Alberto Oneal RN  Outcome: Ongoing  Goal: Control of acute pain  Description: Control of acute pain  3/7/2022 1824 by Carlos Alberto Oneal RN  Outcome: Ongoing  Goal: Control of chronic pain  Description: Control of chronic pain  3/7/2022 1824 by Carlos Alberto Oneal RN  Outcome: Ongoing

## 2022-03-08 NOTE — PROGRESS NOTES
Patient requesting pain medication for complaints of lower back and hip pain of 7  -  Medicated w/ dilaudid 1mg IV slowly. Will monitor for effectiveness.

## 2022-03-08 NOTE — PROGRESS NOTES
Vitals:    03/08/22 0400   BP: 126/75   Pulse: 89   Resp: 16   Temp: 98.4 °F (36.9 °C)   SpO2: 97%     Morning labs collected, pt rating pain 7/10 & c/o nausea,  Zofran & pain med given. PO meds held pt NPO. Pt A&O x 4, pt denies any further needs. Will continue to monitor.
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
## 2022-03-09 NOTE — PROGRESS NOTES
Speech Language Pathology  Facility/Department: [facility]  PCU  Dysphagia Daily Treatment Note     Recommendations: SLP recommends to continue with regular solids and continue with thin liquids. Straws ok. Meds whole with thin liquids   Risk Management: Assist feed, upright as possible for all oral intake, small bites/sips, straws ok, distant supervision, general aspiration precautions. If the patient exhibits s/s of aspiration and/or worsening respiratory status, hold PO and contact SLP. NAME: Nydia Davila  : 1975  MRN: [id number]    Patient Diagnosis(es):   Patient Active Problem List    Diagnosis Date Noted    ABLA (acute blood loss anemia)     Gastrointestinal hemorrhage     Probable abscess of upper lobe of right lung without pneumonia ([address]     Diabetic ulcer of left midfoot associated with type 2 diabetes mellitus, with necrosis of bone ([address] 2022    Duodenal ulcer 2022    Paroxysmal atrial fibrillation ([address]     Severe protein-calorie malnutrition ([address] 2022    Pressure injury of deep tissue of sacral region 2022    Antibiotic-associated diarrhea 2022    Staph aureus infection 2022    Third degree burn of left hand 2022    Acute renal failure ([address] 2022    Acute osteomyelitis of left foot ([address] 10/26/2020    Allergic rhinitis 2020    Osteoarthritis     Mixed hyperlipidemia 2016    Cardiomyopathy ([address] 2014    Hypertension     Uncontrolled type 2 diabetes mellitus with diabetic polyneuropathy (HCC)      Allergies: Allergies   Allergen Reactions    Januvia [Sitagliptin] Nausea Only     Has taken metformin without side effects in the past.  Nausea with Janumet in the past.     Metformin And Related      GI Upset    Vancomycin      Pt had red face, swelling itching of eyelids, sore throat after receiving vancmomyin and cefepime. I think this was a histamine releasing reaction from vancomycin most likely. The cefepime was switched to Zosyn and patient had no reaction to Zosyn    Mustard Oil [Allyl Isothiocyanate] Swelling and Rash     Onset Date: Pt admitted to ICU on (01/22)     Subjective: Pt seen at bedside with spouse present and RN permission     Pain: The patient does not complain of pain     Current Diet: ADULT DIET; Regular; Low Fiber    Diet Tolerance:  Patient tolerating current diet level without signs/symptoms of penetration / aspiration. Dysphagia Treatment and Impressions:   Pt seen at bedside with spouse present and RN permission   Chart Review/Interview: Pt reported less fatigue and feeling \"good\". Pt reports no clinical s/s of aspiration. Pt and spouse report tolerance of recommended diet. Pt agreeable for PO trials.  Respiratory Status: Pt with SPO2% of 99 on RA  with RR of 16   Liquid PO Trials:    IDDSI 0 Thin:  Assessed via straw: no anterior bolus loss , suspect functional A-P bolus transit, swallow timing subjectively appears timely, no clinical s/s of aspiration and vitals stable.  Solid PO Trials   IDDSI 4 Puree: suspect functional A-P bolus transit, swallow timing subjectively appears timely, oral clearance grossly WFL, no clinical s/s of aspiration and vitals stable   IDDSI 7 Regular Solids: suspect functional A-P bolus transit, swallow timing subjectively appears timely, grossly functional mastication, oral clearance grossly WFL, no clinical s/s of aspiration and vitals stable   Education: SLP edu pt re: Role of SLP, Rationale for dysphagia tx, Recommended compensatory strategies, Aspiration precautions, POC, Evidenced based practice for current recommendations and treatment and Importance of oral care to reduce adverse affects in the event of aspiration. Pt verbalized understanding and RN aware of recommendations   Assessment: Pt tolerating regular solids and thin liquids with no clinical s/s of aspiration.  Good carryover of recommended compensatory strategies.  Continued stamina and overall tolerance improved.  Recommendations: SLP recommends to continue with regular solids and continue with thin liquids. Straws ok. Meds whole with thin liquids   Risk Management: Assist feed, upright as possible for all oral intake, small bites/sips, straws ok, distant supervision, general aspiration precautions. If the patient exhibits s/s of aspiration and/or worsening respiratory status, hold PO and contact SLP. Dysphagia Goals:  Timeframe for Long-term Goals: 7 days (03/03/22)- extend 10 additional days (03/13/2022)  Goal 1: The patient will tolerate least restrictive diet with no clinical s/s of aspiration or worsening respiratory/pulmonary status  3/9/2022 : Goal addressed, see above. Ongoing, progressing. Short-term Goals  Timeframe for Short-term Goals: 5 days (03/01/22)- extend 8 additional days (03/11/2022)    02/21/22)     Goal met     Goal 2: The patient/caregiver will demonstrate understanding of compensatory swallow strategies, for improved swallow safety   3/9/2022 : Goal addressed, see above. Ongoing, progressing.     Goal 3: The patient will tolerate instrumental assessment when able   Goal met      Goal 4: The patient will tolerate recommended diet with no clinical s/s of aspiration 5/5   3/9/2022 : Goal addressed, see above. Ongoing, progressing. Speech/Language/Cog Goals: N/A    Recommendations:  Solid Consistency: IDDSI 7 Regular Solids  Liquid Consistency: IDDSI 0 Thin Liquids - straws OK  Medication: Meds whole with thin liquids    Patient/Family/Caregiver Education: See above    Compensatory Strategies: See above    Plan:    Continued Dysphagia treatment with goals per plan of care. Discharge Recommendations: LTACH/SNF-defer to PT/OT    If pt discharges from hospital prior to Speech/Swallowing discharge, this note serves as tx and discharge summary.      Total Treatment Time / Charges     Time in Time out Total Time / units   Cognitive Tx         Speech Tx      Dysphagia Tx 1440 1503 23 mins / 1 unit     Signature:  Kane Pereira M.A., Gracie Alvarez #[id number]  Speech-Language Pathologist  Phone: [phone]

## 2022-03-09 NOTE — PROGRESS NOTES
PROGRESS NOTE    Admit Date:  1/22/2022    Subjective:  42 y.o. male who is seen for evaluation of diabetic foot ulcer and abscess left foot. History obtained from chart. S/P ulcer debridement 2/1/22. I&D left wrist with ortho on 2/5/22. States feels OK today. No issues with the left foot.        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    Chronic osteomyelitis of left foot ([address] 10/27/2020    Closed displaced fracture of distal phalanx of right little finger 4/8/2021    Clostridium difficile infection 11/01/2016    Diabetic ulcer of left forefoot associated with type 2 diabetes mellitus, with necrosis of bone ([address] 8/1/2019    Diabetic ulcer of right great toe associated with type 2 diabetes mellitus, with necrosis of bone ([address] 08/2019    Enterococcal infection 2/3/2022    Failed soft 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 toe amputation site 10/26/2020    History of hyperbaric oxygen therapy 10/28/2020    [facility]- carmichael 3 - compromised flap    Infective tenosynovitis of extensor tendons of left hand 2/3/2022    Migraine     MSSA bacteremia 1/23/2022    Possible perforated tympanic membrane     as a result of recurrent otitis    Post-op hematoma of left foot 11/12/2020    Recurrent otitis media     Septic shock (HCC)     Staphylococcal arthritis of left foot ([address] 2/1/2022    Syncope     Tear of medial meniscus of left knee     Tobacco use     Toe osteomyelitis, left ([address] 7/24/2020    VAP (ventilator-associated pneumonia) [facility])        Past Surgical History:        Procedure Laterality Date    ARM SURGERY Left 02/05/2022 provider] heparin (porcine)  5,000 Units SubCUTAneous 3 times per day    sodium chloride flush  5-40 mL IntraVENous 2 times per day       Allergies:  Januvia [sitagliptin], Metformin and related, Vancomycin, and Mustard oil [allyl isothiocyanate]    Social History:    Social History     Tobacco Use    Smoking status: Former Smoker     Packs/day: 0.50     Years: 2.00     Pack years: 1.00     Quit date: 2005     Years since quittin.5    Smokeless tobacco: Former User     Quit date: 2005    Tobacco comment: SMOKED OCCASIONALLY UNTIL 8 YEARS AGO   Vaping Use    Vaping Use: Never used   Substance Use Topics    Alcohol use: Yes     Comment: RARELY    Drug use: No       Family History:       Problem Relation Age of Onset    Diabetes Mother     High Blood Pressure Mother     High Cholesterol Mother     Diabetes Father     High Blood Pressure Father     High Cholesterol Father     Stroke Father     High Blood Pressure Sister     High Blood Pressure Maternal Uncle     High Cholesterol Maternal Uncle     High Blood Pressure Maternal Grandmother            Objective:   BP (!) 145/82   Pulse 88   Temp 98.7 °F (37.1 °C) (Oral)   Resp 20   Ht 6' 1\" (1.854 m)   Wt 244 lb 8 oz (110.9 kg)   SpO2 99%   BMI 32.26 kg/m²     Data:  CBC:   Recent Labs     22  0405 22  0440 22  0539   WBC 5.8 5.2 4.2   HGB 7.5* 7.6* 6.9*   HCT 21.4* 22.5* 20.7*   MCV 89.0 89.9 89.8    233 231     BMP:   Recent Labs     22  0405 22  0440 22  0539    137 135*   K 3.6 3.7 3.5    102 102   CO2    PHOS 4.1 3.8 3.3   BUN 38* 38* 37*   CREATININE 3.0* 2.9* 2.9*     LIVER PROFILE:   No results for input(s): AST, ALT, LIPASE, BILIDIR, BILITOT, ALKPHOS in the last 72 hours. Invalid input(s): AMYLASE,  ALB  PT/INR:   No results for input(s): PROT, INR in the last 72 hours.   HgBA1c:  Lab Results   Component Value Date    LABA1C 10.6 2022       Cultures: blood - MSSA    Foot wound - MSSA    Hand wound - Enterococcus faecalis      Imaging: xray left foot -   Remote surgical history of amputation at the 1st and 2nd digits. Margins of   the remaining 1st metatarsal are smooth with slight bony remodeling following   prior surgery. Margins of the remaining 2nd metatarsal are also smooth with   heterotopic bone and fusion of fragments at the base of the previously   remaining proximal phalanx. Subtle erosions are seen at the 3rd MTP joint   involving the base of the phalanx and the distal head of the 3rd metatarsal.   There is severe soft tissue swelling at the prior surgical site. Questionable pattern of subcutaneous gas along the lateral aspect of the left   foot adjacent to the shaft of the 5th metatarsal.  There is severe   disorganization of bone at the tarsometatarsal joints of the 2nd through 5th   digits. Impression:  1. Remote history of amputation at the 1st and 2nd digits. No evidence of   osteomyelitis at prior resection site. 2. Subtle erosions at the 3rd MTP joint are new. This is adjacent to soft   tissue swelling at the prior amputation site. A new focus of osteomyelitis   is suspected. 3. Soft tissue swelling and questionable subcutaneous gas along the lateral   aspect of the left foot. There is also severe disorganization of bone at the   2nd through 5th tarsometatarsal joints. Although pattern may represent   Charcot arthropathy due to the more diffuse appearance, areas of   osteomyelitis cannot be excluded with plain film. Correlate with physical   exam and clinical workup. A follow-up MRI may be helpful for further   evaluation. MRI Left foot -   1. Diffuse subcutaneous edema with organized complex collection along the   dorsal soft tissues of the foot which communicates with large midfoot   effusion. The dorsal collection measures 2.0 x 4.0 x 4.1 cm.   Findings   highly suspicious for abscess and septic joint given patient history. 2. Marrow signal changes throughout the midfoot and extending along the 2nd   through 5th metatarsals which are most suggestive of osteomyelitis in the   setting of soft tissue infection. Underlying Charcot arthropathy also   present. Physical Exam:    DP/PT palpable bilateral  Right foot - no open lesions noted. No pressure lesions noted. Left foot - Dry dressing intact. No strikethrough noted. No calf tenderness noted. Assessment:  Patient Active Problem List   Diagnosis Code    Hypertension I10    Uncontrolled type 2 diabetes mellitus with diabetic polyneuropathy ([address] E11.42, E11.65    Cardiomyopathy ([address] I42.9    Mixed hyperlipidemia E78.2    Allergic rhinitis J30.9    Osteoarthritis M19.90    Acute osteomyelitis of left foot ([address] M86.172    Acute renal failure (HCC) N17.9    Staph aureus infection A49.01    Third degree burn of left hand T23.302A    Antibiotic-associated diarrhea K52.1, T36.95XA    Pressure injury of deep tissue of sacral region L89.156    Severe protein-calorie malnutrition (HCC) E43    Paroxysmal atrial fibrillation (HCC) I48.0    Duodenal ulcer K26.9    Diabetic ulcer of left midfoot associated with type 2 diabetes mellitus, with necrosis of bone (HCC) E11.621, L97.424    Probable abscess of upper lobe of right lung without pneumonia (HCC) J85.2    Gastrointestinal hemorrhage K92.2    ABLA (acute blood loss anemia) D62     S/P debridement 2/1/22 and 3/8/22  diabetic foot ulcer left secondary to peripheral neuropathy   diabetes mellitus uncontrolled  Bacteremia (MSSA)  Charcot arthropathy left foot  Acute osteomyelitis left foot secondary to diabetes mellitus       Plan  Patient examined. Reviewed labs and wound pictures. Antibiotics as per Dr. Jeremy Miller. Sheryle Conn is OK to be reapplied today. I would recommend continuing VAC at [facility] in order to stimulate wound healing. Length of time for treatment depends on healing rates of the wound. Once VAC healing is maxed out then probably convert to collage dressing depending on stage of wound. Discussed with Dr. Ingrid Loja. H/H low again today. Getting transfusion later today. Patient can follow up in the [facility] with Dr. Rogelio Cho and myself on a Monday to evaluate and treat his wounds. Will follow.          Electronically signed by Ji Maddox DPM on 3/9/2022 at 8:46 AM.

## 2022-03-09 NOTE — PROGRESS NOTES
Physical Therapy  Inpatient Physical Therapy Daily Treatment Note    Unit: PCU  Date:  3/9/2022  Patient Name:    Day Richardson  Admitting diagnosis:  Hyperglycemia [R73.9]  RODRICK (acute kidney injury) ([facility] [N17.9]  Acute renal failure, unspecified acute renal failure type ([facility] [N17.9]  Syncope, unspecified syncope type [R55]  Admit Date:  1/22/2022  Precautions/Restrictions:  Fall risk, Bed/chair alarm, Lines -IV, Munoz catheter, PICC right and rectal tube, Telemetry, Continuous pulse oximetry and multiple wounds      Discharge Recommendations: SNF  DME needs for discharge: defer to facility       Therapy recommendation for EMS Transport: requires transport by cot due to requires lift equipment for transfer    Therapy recommendations for staff:   Assist of 2 for bed mobility (encourage participation)  Maxi-move from bed <> chair    History of Present Illness: Per H&P Dr. Evangelist Snow 1/23: \"Patient is a 57-year-old white male with a prior history of diabetes mellitus type 2 poorly controlled, CHF, history of diabetic ulcer with amputation of the right great, history of tobacco abuse, recent burn to the left hand-Velban been feeling well since Wednesday.  Initially thought he had [address] test is negative.  Patient is sleeping more and had decreased appetite.  Wife finally called 911.  Work-up in the ED showed acute kidney injury.  His creatinine was normal in October 2021. John Hess is admitted to the hospital and started on IV fluids.  This morning his creatinine is worse.  He is made about 300 cc of urine.  His mentation is worse.  He is not requiring oxygen.  The time of admission he did not require Bea Jolly is presently on 5 L and saturating 90%.  His respiratory rate is also got worse.  His mentation is about the same. Mara Josias can answer some questions.  He denies any chest pain. \"  [facility] called 01/23/22. Patient not breathing with no pulse.  CPR and ACLS protocol were followed after which patient gained pulse and blood pressure back. In route to ICU he lost his pulse again for which CPR was restarted. Patient gained his pulse back a second time and started on epinephrine drip.   Intubated 22 - 22; extubated and re-intubated due to unable to clear secretions/hypoxia on 22 after less than 12 hours. Pt extubated on 22. S/p L hand I&D : reintubation  : Extubated   : ex lap, oversew of duodenal ulcer, pyloroplasty, J tube insertion    Home Health S4 Level Recommendation: NA  AM-PAC Mobility Score   AM-PAC Inpatient Mobility Raw Score : 7  AM-PAC Inpatient Mobility without Stair Climbing Raw Score : 5    Treatment Time:  5782-3576  Treatment number: 3  Timed Code Treatment Minutes: 23 minutes  Total Treatment Minutes:  23  minutes    Cognition    A&O x4   Able to follow 2 step commands    Subjective  Patient lying supine in bed with no family present   Pt agreeable to this PT tx. Pain   Yes  Location: low back  Ratin/10 before mobility  Pain Medicine Status: Received pain med prior to tx     Bed Mobility   Supine to Sit:    Not Tested  Sit to Supine:   Not Tested  Rolling: Max A 1-2; max encouragement to move limbs to participate in bed mobility  Scooting:   Not Tested    Transfer Training     Sit to stand:   Not Tested  Stand to sit:   Not Tested  Bed to Chair:   Total A and 2 persons with use of MAXI-Move; educated pt on technique with lift to encourage pt to be more independent with directing care. Verbal cues to initiate movement with BLE to position sling    Gait Training gait deferred due to difficulty with transfers; pt ambulated 0 ft. Stair Training deferred, pt unsafe/not appropriate to complete stairs at this time    Therapeutic Exercise Garrison deferred secondary to treatment focus on functional mobility  NA    Balance  Sitting:  Not tested; Not Tested    Standing: Not tested;  Not Tested    Patient Education      Role of PT, POC, Discharge recommendations, DC recommendations, safety awareness, transfer techniques, HEP and calling for assist with mobility. Positioning Needs       Pt reclined in chair, no alarm needed, positioned in proper neutral alignment and pressure relief provided. Call light provided and all needs within reach    Activity Tolerance   Pt completed therapy session with No adverse symptoms noted w/activity. Assessment :  Patient tolerated transfer out of bed well this date. Pt rolls B with max(A)x1-2 to position sling, with verbal cues for technique to improve participation. Pt positioned in chair with waffle cusion + 2 z- laverne under hips for pressure relief. Pt would benefit from continued skilled PT to address current deficits. Recommending SNF upon discharge as patient functioning well below baseline, demonstrates good rehab potential and unable to return home due to inability to negotiate stairs to enter home/bedroom/bathroom, burden of care beyond caregiver ability, home environment not conducive to patient recovery and limited safety awareness. Goals (all goals ongoing unless otherwise indicated)  To be met in 3 visits:  1). Independent with LE Ex x 10 reps     To be met in 6 visits:  1).  Supine to/from sit: Max A   2).  Patient will be able to roll on either side with Max A x 2 persons. --3/9 Goal met   3).  Patient will be to tolerate sitting at the EOB for 2 min  4). Patient will be able to initiate LEs movements to participate in functional mobility. Plan   Continue with plan of care. Signature: Milan Carig, PT, DPT      If patient discharges from this facility prior to next visit, this note will serve as the Discharge Summary.

## 2022-03-09 NOTE — CARE COORDINATION
INTERDISCIPLINARY PLAN OF CARE CONFERENCE    Date/Time: 3/9/2022 10:42 AM  Completed by: Daxa Cardoza RN, Case Management      Patient Name:  Jennifer Carrillo  YOB: 1975  Admitting Diagnosis: Hyperglycemia [R73.9]  RODRICK (acute kidney injury) ([facility] [N17.9]  Acute renal failure, unspecified acute renal failure type ([facility] [N17.9]  Syncope, unspecified syncope type [R55]     Admit Date/Time:  1/22/2022  1:32 PM    Chart reviewed. Interdisciplinary team contacted or reviewed plan related to patient progress and discharge plans. Disciplines included Case Management, Nursing, and Dietitian. Current Status:ongoing  PT/OT recommendation for discharge plan of care: LTACH    Expected D/C Disposition:  LTACH  Confirmed plan with patient and/or family Yes confirmed with: (name) pt and wife  Met with:pt and wife  Discharge Plan Comments: Reviewed chart. Role of discharge planner explained and patient and wife verbalized understanding. Pt is from home with wife. Pt has a Jtube. Pt is awaiting precert to come back from [facility]. Karina with [facility] stated that precert for [facility] came back today. eJnnifer Brasher states that she is able to accept pt if he luna snot need HD and would need Nephro to sigh off that pt luna not need HD and tunnel cath would need to be pulled. Or if he needs to have HD, she currently does not have a HD bed. Jennifer Brasher also stated that she is aware that pt has not had HD since 3/1/2022. If nephro was wanting pt to still watch for renal recovery, and keep tunnel cath in, then they could to accept pt until 14 days after last HD. Melani Vidal with W[facility] Care is awaiting to place Wound Vac based on if pt is discharging today or not. MATTHEW spoke with Dr. Sandra Yan and he is contacting Dr. Silvia Michael to inquire if he feels pt can have tunnel cath pulled. Karina with [facility] is inquiring to see if pt can go today, and they have a bed at [facility] if tunnel cath is pulled.        Awaiting call back from  Chapin Solares and Karina with [facility]. Pt is on RA    Pt currently has HD arranged at [facility] MWF 540am arrival.  We will cancel this via [facility] if tunnel cath is pulled. Addendum 3/9/2022 1345: Per Karina with [facility], as long a HD cath has been pulled AND nephro states no longer needs HD, they will work to d/c pt on 3/10/2022. CM informed Dr. Chapin Solares of this. Dr. Chapin Solares states HD cath will be pulled as it was approved by Nephro. Dr. Chapin Solares states pt will go to [facility] on IV antibx. Pt already has a dual lumen PICC line in place. eC Urrutia RN is aware. We will cancel this via [facility] if tunnel cath is pulled. Addendum 4256: CM spoke with Dr. Ashwin Villagomez and he has stated in his note, no HD is needed and that tunnel cath can be pulled. Per Dr. Ashwin Villagomez, pt is ready for d/c.   CM informed Cholo Pacheco of this. CM called Karina with [facility] to inform her of this. Plan is for pt to go to [facility]. Karina with [facility] to let us know about bed availability tomorrow AM.     Once pt is being d/c'd to [facility], will need to call [facility] ([phone]) and let Amy Rodríguez know that pt no longer needs the HD slot at [facility]. Pt and wife are aware of possible d/c tomorrow with verbalized understanding. They do not have a preference for ambulance transport.        Home O2 in place on admit: No  Pt informed of need to bring portable home O2 tank on day of discharge for nursing to connect prior to leaving:  Not Indicated  Verbalized agreement/Understanding:  Not Indicated

## 2022-03-09 NOTE — PROGRESS NOTES
[facility] Infectious Disease Progress Note      Juice Shell     : 1975    DATE OF VISIT:  3/9/2022  DATE OF ADMISSION:  2022       Subjective:     Juice Shell is a 42 y.o. male whom I've been seeing for an MSSA left foot osteomyelitis. Since I last saw him, he had surgery yesterday for that left foot -- resection of the remaining unhealthy-appearing bone in that midfoot -- other bones were said to bleed with probing, so not debrided. He's feeling well overall, occasional cough, no SOB or CP, occasional nausea mostly with movement, little abd pain, no foot pain, still some diarrhea, no F/C/D. Mr. George Webb has a past medical history of Abscess of left foot, Abscess of left hand, Acute encephalopathy, Acute hypoxemic respiratory failure ([address] Acute osteomyelitis of left hand ([address] Acute osteomyelitis of right hallux [facility]), Cardiopulmonary arrest ([address] Cellulitis of left foot, CHF (congestive heart failure) ([address] Chronic osteomyelitis of left foot ([address] Closed displaced fracture of distal phalanx of right little finger, Clostridium difficile infection, Diabetic ulcer of left forefoot associated with type 2 diabetes mellitus, with necrosis of bone ([address] Diabetic ulcer of right great toe associated with type 2 diabetes mellitus, with necrosis of bone ([address] Enterococcal infection, 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     Current Facility-Administered Medications: gabapentin (NEURONTIN) capsule 200 mg, 200 mg, Oral, TID  insulin lispro (HUMALOG) injection vial 0-18 Units, 0-18 Units, SubCUTAneous, 4x Daily AC & HS  PARoxetine (PAXIL) tablet 10 mg, 10 mg, Oral, QAM  traZODone (DESYREL) tablet 50 mg, 50 mg, Oral, Nightly  tiZANidine (ZANAFLEX) tablet 8 mg, 8 mg, Oral, BID  nafcillin 2,000 mg in dextrose 5 % 100 mL IVPB, 2,000 mg, IntraVENous, Q4H  epoetin [place]-epbx (RETACRIT) injection 10,000 Units, 10,000 Units, IntraVENous, Q MWF  pantoprazole (PROTONIX) injection 40 mg, 40 mg, IntraVENous, BID  HYDROmorphone (DILAUDID) injection 1 mg, 1 mg, IntraVENous, Q2H PRN  ondansetron (ZOFRAN) injection 4 mg, 4 mg, IntraVENous, Q6H PRN  oxyCODONE-acetaminophen (PERCOCET) 5-325 MG per tablet 1 tablet, 1 tablet, Oral, Q4H PRN  sucralfate (CARAFATE) tablet 1 g, 1 g, Oral, 4x Daily AC & HS  dilTIAZem (CARDIZEM) tablet 30 mg, 30 mg, Oral, 4 times per day  b complex-C-folic acid (NEPHROCAPS) capsule 1 mg, 1 capsule, Oral, Daily  carvedilol (COREG) tablet 12.5 mg, 12.5 mg, Oral, BID WC  povidone-iodine (BETADINE) 10 % external solution, , Topical, Daily  [Held by provider] heparin (porcine) injection 5,000 Units, 5,000 Units, SubCUTAneous, 3 times per day  heparin (porcine) injection 3,000 Units, 3,000 Units, IntraVENous, PRN  sodium chloride 0.9 % irrigation 1,000 mL, 1,000 mL, Irrigation, Continuous PRN  albumin human 25 % IV solution 12.5 g, 12.5 g, IntraVENous, PRN  heparin (porcine) injection 2,600 Units, 2,600 Units, IntraCATHeter, PRN  sodium chloride flush 0.9 % injection 5-40 mL, 5-40 mL, IntraVENous, 2 times per day  sodium chloride flush 0.9 % injection 5-40 mL, 5-40 mL, IntraVENous, PRN  0.9 % sodium chloride infusion, 25 mL, IntraVENous, PRN  acetaminophen (TYLENOL) tablet 650 mg, 650 mg, Oral, Q6H PRN **OR** acetaminophen (TYLENOL) suppository 650 mg, 650 mg, Rectal, Q6H PRN  glucose (GLUTOSE) 40 % oral gel 15 g, 15 g, Oral, PRN  glucagon (rDNA) injection 1 mg, 1 mg, IntraMUSCular, PRN  dextrose 5 % solution, 100 mL/hr, IntraVENous, PRN  dextrose bolus (hypoglycemia) 10% 125 mL, 125 mL, IntraVENous, PRN **OR** dextrose bolus (hypoglycemia) 10% 250 mL, 250 mL, IntraVENous, PRN     This is day 46 of MSSA therapy overall, POD 36 / 1. Allergies: Januvia [sitagliptin], Metformin and related, Vancomycin, and Mustard oil [allyl isothiocyanate]    Pertinent items from the review of systems are discussed in the HPI; the remainder of the ROS was reviewed and is negative.      Objective:     Vital signs over the last 24 hours:  Temp  Av.3 °F (36.8 °C)  Min: 98 °F (36.7 °C)  Max: 98.7 °F (37.1 °C)  Pulse  Av.8  Min: 82  Max: 95  Systolic (59WVZ), IDX:344 , Min:125 , QAS:892   Diastolic (43PRA), AWX:17, Min:70, Max:85  Resp  Av.3  Min: 16  Max: 20  SpO2  Av.8 %  Min: 98 %  Max: 99 %    Constitutional:  well-developed, well-nourished, not as weak and fatigued, looks well  Neuropsych: awake, alert, interactive, not anxious or agitated  Eyes:  pupils equal, round, reactive to light; sclerae anicteric, conjunctivae pale  ENT:  atraumatic; no labial ulcers; no thrush, mucous membranes moist  Resp:  decreased at the bases, clearer overall  Cardiovascular:  heart regular, no murmur; generally mild extremity edema; right PICC in place, and a right IJ HD line, exit sites benign  GI:  abdomen soft, less tenderness, less distended, good bowel sounds, no palpable masses or organomegaly; rectal tube in place, with some ongoing diarrhea; on the abdomen, surgical site looks good, right sided MAX drain with still a good deal of serosanguinous fluid, left sided J-tube in place  : Munoz in place, increasing amount of clearer yellow urine   Musculoskeletal:  no clubbing, cyanosis or petechiae; extremities with no gross effusions or acute arthritis  Skin: warm, dry, normal turgor; no acute rash.      Wounds not examined today.   ______________________________    Recent Labs     22  0539 22  0440 22  0405   WBC 4.2 5.2 5.8   HGB 6.9* 7.6* 7.5*   HCT 20.7* 22.5* 21.4*   MCV 89.8 89.9 89.0    233 223     Lab Results   Component Value Date    CREATININE 2.9 (H) 03/09/2022     Lab Results   Component Value Date    LABALBU 2.3 (L) 03/09/2022     Lab Results   Component Value Date    ALT <5 (L) 02/10/2022    AST 8 (L) 02/10/2022     (H) 01/31/2022    ALKPHOS 217 (H) 02/10/2022    BILITOT 0.5 02/10/2022      Lab Results   Component Value Date    LABA1C 10.6 01/23/2022     Other recent pertinent labs: Anion gap 10. Glucoses 200s - 300 at times. WBC diff with mild lymphopenia. cRP still up at 54.9 this week.   ______________________________    Recent pertinent micro results:  Nothing the last couple of weeks. OR path from yesterday pending.   ______________________________    Recent imaging results (last 7 days):     FL UGI    Result Date: 3/3/2022  No evidence of duodenal perforation or leak following recent surgery.       Assessment:     Patient Active Problem List   Diagnosis Code    Hypertension I10    Uncontrolled type 2 diabetes mellitus with diabetic polyneuropathy ([facility] E11.42, E11.65    Cardiomyopathy ([facility] I42.9    Mixed hyperlipidemia E78.2    Allergic rhinitis J30.9    Osteoarthritis M19.90    Acute osteomyelitis of left foot ([facility] M86.172    Acute renal failure (HCC) N17.9    Staph aureus infection A49.01    Third degree burn of left hand T23.302A    Antibiotic-associated diarrhea K52.1, T36.95XA    Pressure injury of deep tissue of sacral region L89.156    Severe protein-calorie malnutrition (HCC) E43    Paroxysmal atrial fibrillation (HCC) I48.0    Duodenal ulcer K26.9    Diabetic ulcer of left midfoot associated with type 2 diabetes mellitus, with necrosis of bone (HCC) E11.621, L97.424    Probable abscess of upper lobe of right lung without pneumonia (HCC) J85.2    Gastrointestinal hemorrhage K92.2    ABLA (acute blood loss anemia) D62     Assessment of today's active condition(s):      --          Background of uncontrolled DM2, neuropathy, no known PAD, multiple prior diabetic foot ulcers, infections, surgeries. Most recent wounds were at the left 1st and 2nd ray, but they had been healed for just about a year.      --          Admission this time with septic shock related primarily to deep MSSA foot infection (cellulitis, abscess, septic arthritis, acute osteo), with acute renal failure, lactic acidosis, then cardiac arrest, resuscitation, acute respiratory failure, rapid Afib. Shock resolved, respiratory failure resolved (I think perhaps mostly due to CHF / fluid overload after cardiac arrest). Initial sepsis finally resolved, after aggressive OR treatment of left foot and left hand infections. Left foot wound overall doing better, but still some necrotic deep soft tissue and bone, debrided again in the OR yesterday.      --          ARF definitely slowly improving now.      --          Third degree burn of left hand, with MSSA-Enterococcal abscess, tendosynovitis, presumed acute osteo, but that infection is now thought to be resolved, wound almost healed.     --          Resolved encephalopathy, likely multifactorial (cardiac arrest, ICU stay, sedatives, sepsis, renal failure, etc). Also wondering if he might not have a significant degree of critical-illness myopathy and/or neuropathy. Peripheral strength does seem better than last week, at least distal UE's.      --          Colonization with Candida, not surprising given DM, prior steroids, extensive Abx Rx.      --          Unstageable pressure ulcer of sacrum (scalp basically healed) -- conservative Rx for now, with [facility] - looking a bit better each week.      --          About 3-4 weeks ago a setback with difficulty in clearing secretions, hypoxemia, worsening mental status, rapid Afib, reintubation.  I think this was more of an issue of retained secretions and perhaps aspiration pneumonitis, as opposed to a true invasive pneumonia. Respiratory function seemed to improve quickly after bronch and supportive care, nothing clearly new on CXR, FIO2 has come down, WBC quickly back down to normal, and nothing on bronch wash / BAL. Now extubated, and looking better than he has recently. Off O2 now.      --          He does look to have a small RUL lung abscess on recent CXR and then CT scan. Could have been a septic embolic process that then cavitated, or could have been an abscess that resulted from that episode of probable aspiration a few weeks ago.      --          Persistent / recurrent anemia, heme (+), large DU found at EGD. Still requiring PRBCs at times. Failed to stop bleeding despite endoscopic and IR treatment, so now post-op from a DU oversew, pyloroplasty, J-tube placement.      --          Multifactorial diarrhea (antibiotics, tube feeds, GI bleed. ...). Negative for Cdiff a few weeks ago. I think improving a bit last week, perhaps with treatment of the GI bleed.     Treatment recs:     Continue nafcillin for now. Total length of therapy not yet certain -- perhaps as little as another week, depending on how the foot looks next week. Watch for Cdiff, allergy, line complication, Candidiasis. Will follow up on that OR path report.     No change in wound care right now (left foot Rx per Dr. Eddie Harvey).      Gabapentin dose staying the same for now, until renal function improves more. D/W Dr. Dee Umaña on Monday.      Eventual repeat CT scan of the chest, to try to document complete resolution of that presumed lung abscess (later in April, assuming that he doesn't show signs of relapsing lung infection between now and then).     I think the only obstacles to discharge now are decisions on where he would go for ongoing care, what to do with the HD line, what to do with the MAX drain +/- J tube. Will need a good period of time for PT, OT, nutrition support, will be important to continue to work on glucose control and offloading. Electronically signed by Bettina German MD on 3/9/2022 at 12:41 PM.

## 2022-03-09 NOTE — PROGRESS NOTES
Patient requesting pain medication for left shoulder pain of 7. Medicated w/ 1 percocet tablet po. Will monitor for effectiveness. Patient also repositioned for comfort.

## 2022-03-09 NOTE — PROGRESS NOTES
Pt had a critical hgb of 6.9 this morning and a critical hct of 20.7, per Bossman in lab. Dr Josie Montez notified regarding labs.

## 2022-03-09 NOTE — PROGRESS NOTES
Occupational Therapy Daily Treatment Note    Unit: PCU  Date:  3/9/2022  Patient Name:    Nydia Davila  Admitting diagnosis:  Hyperglycemia [R73.9]  RODRICK (acute kidney injury) [facility]) [N17.9]  Acute renal failure, unspecified acute renal failure type ([facility] [N17.9]  Syncope, unspecified syncope type [R55]  Admit Date:  1/22/2022  Precautions/Restrictions:   fall risk Bed/chair alarm, Lines -IV, Munoz catheter, PICC right and Wound vac present on the L foot, rectal tube, Confusion, Telemetry, Continuous pulse oximetry and multiple wounds on the sacrum, L dorsal aspect of the hand and L foot         Discharge Recommendations: LTACH  DME needs for discharge: defer to facility       Therapy recommendations for staff:   Assist of 2 (total assist) with use of MAXI-Move for all transfers to/from BSC/chair    AM-PAC Score: AM-PAC Inpatient Daily Activity Raw Score: 6  Home Health S4 Level: NA       Treatment Time:  10:32-11:27  Treatment number:  4  Total Treatment Time: 55 minutes    History of Present Illness:  Per H&P Dr. Arndt Friend 1/23: \"Patient is a 46-year-old white male with a prior history of diabetes mellitus type 2 poorly controlled, CHF, history of diabetic ulcer with amputation of the right great, history of tobacco abuse, recent burn to the left hand-Velban been feeling well since Wednesday.  Initially thought he had [address] test is negative.  Patient is sleeping more and had decreased appetite.  Wife finally called 911.  Work-up in the ED showed acute kidney injury.  His creatinine was normal in October 2021.  Patient is admitted to the hospital and started on IV fluids.  This morning his creatinine is worse.  He is made about 300 cc of urine.  His mentation is worse.  He is not requiring oxygen.  The time of admission he did not require oxygen.  He is presently on 5 L and saturating 90%.  His respiratory rate is also got worse.  His mentation is about the same. Patrick Bolton can answer some questions. Patrick Bolton denies any chest pain. \"     CODE BLUE called 22. Patient not breathing with no pulse. CPR and ACLS protocol were followed after which patient gained pulse and blood pressure back. In route to ICU he lost his pulse again for which CPR was restarted. Patient gained his pulse back a second time and started on epinephrine drip.      Intubated 22 - 22; extubated and re-intubated due to unable to clear secretions/hypoxia on 22 after less than 12 hours. Pt extubated on 22.     S/p L hand I&D      POD #3 ex lap, oversew of duodenal ulcer, pyloroplasty, J tube insertion      Subjective:  Patient supine in bed and agreeable to treatment. Patient requesting to transfer to chair. Pain   Yes  Ratin  Location: buttock  Pain Medicine Status: Received pain med prior to tx      Bed Mobility:   Supine to Sit:  Not Tested  Sit to Supine:  Not Tested  Rolling: Max A  Scooting:        Not Tested     Patient able to maintain side lying position with CG-min A. Patient able to sit unsupported in chair with mod A. Transfer Training:   Sit to stand:   Not Tested  Stand to sit:  Not Tested  Bed to Chair:  total A via maxi-move  Bed to [facility]:   Not Tested  Standard toilet:   Not Tested    Activity Tolerance   Pt completed therapy session with Pain noted with bed mobility  SpO2:   HR:   BP:     Balance  Sitting Balance : Mod A in chair  Standing Balance   Not Tested    ADL Training:   Upper body dressing: Mod A to don gown  Upper body bathing:  Not Tested  Lower body dressing:  Not Tested  Lower body bathing:  Not Tested  Toileting:   Not Tested  Grooming/Hygiene:  Not Tested  Feeding :   Min A to hold large cup to take a drink. Patient able to hold pencil and participate in word search activity. Provided patient with squeeze ball to work on hand strength and sensation.      Therapeutic Exercise:   Shoulder flexion AAROM: x10  Shoulder abd/add AAROM: x10  Shoulder horizontal abd/add AAROML x10  Elbow flex/ext AROM: x10  Supination/pronation: x10    Patient Education:   Role of OT  Recommendations for DC   Use of B UEs for participation in all activities    Positioning Needs:   Reclined in chair with call light and needs in reach. Alarm Set   Patient sitting on waffle cushion with z-laverne pillows under each hip. Family Present:  Yes    Assessment: Patient with significantly improved tolerance and motivation this session. Patient using B UEs more. Will need SNF/LTACH at DC to continue to improve independence and activity tolerance. GOALS  To be met in 3 Visits:  1). Bed to toilet/BSC: maxi-lift to chair     To be met in 5 Visits:  1). Supine to/from Sit:             Access when ready          6).  Pt to demonstrate UE exs x 10 reps with minimal cues    Plan: cont with [address] [place] [address], OTR/L #[id number]      If patient discharges from this facility prior to next visit, this note will serve as the Discharge Summary

## 2022-03-09 NOTE — PROGRESS NOTES
White port of PICC line reassessed after dwell of cath flow  -  Return of 5ml blood  -  Flushed w/ 10ml NSS.   Patient also repositioned w/ left leg elevated on pillows

## 2022-03-09 NOTE — PROGRESS NOTES
Patient medicated w/ percocet 1 tablet po per patient request for buttock pain of 8. Wound care provided and patient repositioned. Call light in patient's reach.

## 2022-03-09 NOTE — PROGRESS NOTES
Pt has been pleasant and cooperative this shift. Given PRN percocet per [facility] for complaints of pain. Lines and drains managed. Repositioned for comfort. No other needs mentioned.

## 2022-03-09 NOTE — PROGRESS NOTES
[facility] Progress Note           Subjective/   42y.o. year old male who we are seeing in consultation for RODRICK requiring HD. HPI:  Doing well       ROS:  +weak, intake improving, denies any shortness of breath.     Objective/   GEN:  Chronically ill, BP (!) 158/83   Pulse 84   Temp 98.1 °F (36.7 °C) (Oral)   Resp 16   Ht 6' 1\" (1.854 m)   Wt 244 lb 8 oz (110.9 kg)   SpO2 99%   BMI 32.26 kg/m²   HEENT: non-icteric, no JVD  CV: S1, S2 without m/r/g; no LE edema  RESP: CTA B without w/r/r; breathing wnl  ABD: +bs, soft, nt, no hsm  SKIN: warm, no rashes  ACCESS: R IJ vascath    Data/  Recent Labs     03/07/22  0405 03/08/22  0440 03/09/22  0539   WBC 5.8 5.2 4.2   HGB 7.5* 7.6* 6.9*   HCT 21.4* 22.5* 20.7*   MCV 89.0 89.9 89.8    233 231     Recent Labs     03/07/22  0405 03/08/22  0440 03/09/22  0539    137 135*   K 3.6 3.7 3.5    102 102   CO2 22 22 23   GLUCOSE 176* 199* 196*   PHOS 4.1 3.8 3.3   BUN 38* 38* 37*   CREATININE 3.0* 2.9* 2.9*   LABGLOM 23* 24* 24*   GFRAA 28* 29* 29*       Assessment/     -RODRICK likely related to prerenal factors in the setting of sepsis, use of diuretics and losartan contributing to multifactorial ATN.  UA is not suggestive of staph associated glomerulonephritis.  Patient did not respond to IV fluids and developed acute pulmonary edema and renal replacement therapy initiated on 1/23/22; last HD was on 3/1        Recovering renal function      -Acute pulmonary edema            Resolved with dialysis / now looks euvolemic      -S/P Cardio respiratory arrest             Prolonged hospital stay       -Sepsis with MSSA bacteremia with left foot abscess s/p drainage, osteomyelitis, left hand I&D            Status post debridement on 3/8     -Anemia - prn prbc's           1 on 2/28 and 1 on 3/1 and 1 on 3/6     -Diabetes, uncontrolled     -Hypertension     -Weakness:  Prolonged ICU stay following MSSA bacteremia complicated by cardiac arrest.  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  s/p IR embolization of the gastroduodenal artery  On 2/25/22.  CT angiogram with no active bleeding. OR w exp lap and oversew of DU, pyloroplasty on 2/27/22   S/p multiple units of blood transfusion ; unremarkable upper GI series on 3/3       Plan/     -Renal function continues to improve;monitor labs and clinical parameters  -ok to dc vasc cath   -Serial renal panel  -daily wts and strict i/o  -renal dose medications   -avoid nephrotoxins    ____________________________________  Mamta Moreno MD  [facility]  [email or web address]  Office: [phone]

## 2022-03-09 NOTE — PLAN OF CARE
Problem: Falls - Risk of:  Goal: Will remain free from falls  Description: Will remain free from falls  Outcome: Ongoing  Note: Call light within reach, bed alarm intact, non-skid socks in place. Goal: Absence of physical injury  Description: Absence of physical injury  Outcome: Ongoing     Problem: Skin Integrity:  Goal: Will show no infection signs and symptoms  Description: Will show no infection signs and symptoms  Outcome: Ongoing  Goal: Absence of new skin breakdown  Description: Absence of new skin breakdown  Outcome: Ongoing  Note: Turn and reposition. Problem: Confusion - Acute:  Goal: Absence of continued neurological deterioration signs and symptoms  Description: Absence of continued neurological deterioration signs and symptoms  Outcome: Ongoing  Goal: Mental status will be restored to baseline  Description: Mental status will be restored to baseline  Outcome: Ongoing     Problem: Discharge Planning:  Goal: Ability to perform activities of daily living will improve  Description: Ability to perform activities of daily living will improve  Outcome: Ongoing  Goal: Participates in care planning  Description: Participates in care planning  Outcome: Ongoing     Problem: Injury - Risk of, Physical Injury:  Goal: Will remain free from falls  Description: Will remain free from falls  Outcome: Ongoing  Note: Call light within reach, bed alarm intact, non-skid socks in place.    Goal: Absence of physical injury  Description: Absence of physical injury  Outcome: Ongoing     Problem: Mood - Altered:  Goal: Mood stable  Description: Mood stable  Outcome: Ongoing  Goal: Absence of abusive behavior  Description: Absence of abusive behavior  Outcome: Ongoing  Goal: Verbalizations of feeling emotionally comfortable while being cared for will increase  Description: Verbalizations of feeling emotionally comfortable while being cared for will increase  Outcome: Ongoing     Problem: Psychomotor Activity - Altered:  Goal: Absence of psychomotor disturbance signs and symptoms  Description: Absence of psychomotor disturbance signs and symptoms  Outcome: Ongoing     Problem: Sensory Perception - Impaired:  Goal: Demonstrations of improved sensory functioning will increase  Description: Demonstrations of improved sensory functioning will increase  Outcome: Ongoing  Goal: Decrease in sensory misperception frequency  Description: Decrease in sensory misperception frequency  Outcome: Ongoing  Goal: Able to refrain from responding to false sensory perceptions  Description: Able to refrain from responding to false sensory perceptions  Outcome: Ongoing  Goal: Demonstrates accurate environmental perceptions  Description: Demonstrates accurate environmental perceptions  Outcome: Ongoing  Goal: Able to distinguish between reality-based and nonreality-based thinking  Description: Able to distinguish between reality-based and nonreality-based thinking  Outcome: Ongoing  Goal: Able to interrupt nonreality-based thinking  Description: Able to interrupt nonreality-based thinking  Outcome: Ongoing     Problem: Sleep Pattern Disturbance:  Goal: Appears well-rested  Description: Appears well-rested  Outcome: Ongoing     Problem: Nutrition  Goal: Optimal nutrition therapy  Outcome: Ongoing  Goal: Understanding of nutritional guidelines  Outcome: Ongoing     Problem: Coping:  Goal: Ability to cope will improve  Description: Ability to cope will improve  Outcome: Ongoing     Problem: Pain:  Goal: Pain level will decrease  Description: Pain level will decrease  Outcome: Ongoing  Note: Pt was given PRNs per MAR  Goal: Control of acute pain  Description: Control of acute pain  Outcome: Ongoing  Goal: Control of chronic pain  Description: Control of chronic pain  Outcome: Ongoing

## 2022-03-10 NOTE — PROGRESS NOTES
[facility] GENERAL SURGERY DAILY PROGRESS NOTE    SUBJECTIVE: Awake, alert    OBJECTIVE: CURRENT VITALS:  BP (!) 150/75   Pulse 84   Temp 98.1 °F (36.7 °C) (Oral)   Resp 16   Ht 6' 1\" (1.854 m)   Wt 244 lb 8 oz (110.9 kg)   SpO2 99% Comment: room air  BMI 32.26 kg/m²          ABD: Soft  MAX serosanguinous    LABS:    CBC: Recent Labs     03/07/22  0405 03/07/22  0405 03/08/22  0440 03/09/22  0539 03/09/22  1620   WBC 5.8  --  5.2 4.2  --    RBC 2.41*  --  2.51* 2.30*  --    HGB 7.5*   < > 7.6* 6.9* 7.7*   HCT 21.4*   < > 22.5* 20.7* 22.5*   MCV 89.0  --  89.9 89.8  --    RDW 15.8*  --  16.2* 16.5*  --      --  233 231  --     < > = values in this interval not displayed.      BMP:   Recent Labs     03/07/22  0405 03/08/22  0440 03/09/22  0539    137 135*   K 3.6 3.7 3.5    102 102   CO2 22 22 23   PHOS 4.1 3.8 3.3   BUN 38* 38* 37*   CREATININE 3.0* 2.9* 2.9*         ASSESSMENT:   POD 10 oversew bleeding DU        PLAN:   Encourage PO  PT/OT   Rehab soon        Ishaan Austin MD

## 2022-03-10 NOTE — FLOWSHEET NOTE
03/09/22 1957   Vital Signs   Temp 98.4 °F (36.9 °C)   Temp Source Oral   Pulse 84   Heart Rate Source Monitor   Resp 18   BP (!) 149/78   BP Location Left upper arm   Patient Position Semi fowlers   Level of Consciousness Alert (0)   MEWS Score 1   Patient Currently in Pain Yes   Pain Assessment   Pain Assessment 0-10   Pain Type Acute pain   Pain Location Shoulder   Pain Orientation Right   Pain Descriptors Aching;Discomfort   Pain Frequency Continuous   Pain Onset On-going   Clinical Progression Not changed   Functional Pain Assessment Prevents or interferes some active activities and ADLs   Oxygen Therapy   SpO2 98 %   Pulse Oximeter Device Mode Continuous     Shift assessment completed see flow sheet. Patient in bed alert and oriented X4. Patient on Room air, showing no signs of distress. Lungs sounds diminished upon auscultation. Munoz catheter has good output. Oswaldo drain has good output with red bloody output. C/o of shoulder pain prn dilaudid given per request. Evening medications given per order. No other needs at this time. Call light in reach.

## 2022-03-10 NOTE — PLAN OF CARE
Problem: Falls - Risk of:  Goal: Will remain free from falls  Description: Will remain free from falls  Outcome: Ongoing  Note: Free of falls this shift  Goal: Absence of physical injury  Description: Absence of physical injury  Outcome: Ongoing  Note: Free of falls this shift     Problem: Skin Integrity:  Goal: Will show no infection signs and symptoms  Description: Will show no infection signs and symptoms  Outcome: Ongoing  Note: No new breakdown noted  Goal: Absence of new skin breakdown  Description: Absence of new skin breakdown  Outcome: Ongoing     Problem: Confusion - Acute:  Goal: Absence of continued neurological deterioration signs and symptoms  Description: Absence of continued neurological deterioration signs and symptoms  Outcome: Ongoing  Goal: Mental status will be restored to baseline  Description: Mental status will be restored to baseline  Outcome: Ongoing     Problem: Discharge Planning:  Goal: Ability to perform activities of daily living will improve  Description: Ability to perform activities of daily living will improve  Outcome: Ongoing  Goal: Participates in care planning  Description: Participates in care planning  Outcome: Ongoing     Problem: Injury - Risk of, Physical Injury:  Goal: Will remain free from falls  Description: Will remain free from falls  Outcome: Ongoing  Note: Free of falls this shift  Goal: Absence of physical injury  Description: Absence of physical injury  Outcome: Ongoing  Note: Free of falls this shift     Problem: Mood - Altered:  Goal: Mood stable  Description: Mood stable  Outcome: Ongoing  Goal: Absence of abusive behavior  Description: Absence of abusive behavior  Outcome: Ongoing  Note: Patient cooperative and stable mood  Goal: Verbalizations of feeling emotionally comfortable while being cared for will increase  Description: Verbalizations of feeling emotionally comfortable while being cared for will increase  Outcome: Ongoing     Problem: Psychomotor Activity - Altered:  Goal: Absence of psychomotor disturbance signs and symptoms  Description: Absence of psychomotor disturbance signs and symptoms  Outcome: Ongoing     Problem: Sensory Perception - Impaired:  Goal: Demonstrations of improved sensory functioning will increase  Description: Demonstrations of improved sensory functioning will increase  Outcome: Ongoing  Goal: Decrease in sensory misperception frequency  Description: Decrease in sensory misperception frequency  Outcome: Ongoing  Goal: Able to refrain from responding to false sensory perceptions  Description: Able to refrain from responding to false sensory perceptions  Outcome: Ongoing  Goal: Demonstrates accurate environmental perceptions  Description: Demonstrates accurate environmental perceptions  Outcome: Ongoing  Goal: Able to distinguish between reality-based and nonreality-based thinking  Description: Able to distinguish between reality-based and nonreality-based thinking  Outcome: Ongoing  Note: Very oriented today  Goal: Able to interrupt nonreality-based thinking  Description: Able to interrupt nonreality-based thinking  Outcome: Ongoing  Note: Appropriate thought processes     Problem: Sleep Pattern Disturbance:  Goal: Appears well-rested  Description: Appears well-rested  Outcome: Ongoing     Problem: Nutrition  Goal: Optimal nutrition therapy  Outcome: Ongoing  Goal: Understanding of nutritional guidelines  Outcome: Ongoing     Problem: Coping:  Goal: Ability to cope will improve  Description: Ability to cope will improve  Outcome: Ongoing     Problem: Pain:  Description: Pain management should include both nonpharmacologic and pharmacologic interventions.   Goal: Pain level will decrease  Description: Pain level will decrease  Outcome: Ongoing  Goal: Control of acute pain  Description: Control of acute pain  Outcome: Ongoing  Goal: Control of chronic pain  Description: Control of chronic pain  Outcome: Ongoing

## 2022-03-10 NOTE — PROGRESS NOTES
[facility] GENERAL SURGERY    Surgery Progress Note           POD # 11    PATIENT NAME: Nydia Davila     TODAY'S DATE: 3/10/2022    INTERVAL HISTORY:    Pt feels well. He is tolerating a regular diet. OBJECTIVE:   VITALS:  BP (!) 175/88   Pulse 106   Temp 98.5 °F (36.9 °C) (Oral)   Resp 20   Ht 6' 1\" (1.854 m)   Wt 255 lb 1.6 oz (115.7 kg)   SpO2 97%   BMI 33.66 kg/m²     INTAKE/OUTPUT:    I/O last 3 completed shifts: In: 692.9 [P.O.:420; Blood:272.9]  Out: 5485 [Urine:4075; Drains:410; CWPQ]  I/O this shift:  In: 360 [P.O.:360]  Out: -               CONSTITUTIONAL:  awake and alert  ABDOMEN:   normal bowel sounds, soft, non-distended, MAX with serosanguineous drainage. It was removed  INCISION: clean, dry    Data:  CBC: Recent Labs     220 22  0539 22  1620 03/10/22  0507   WBC 5.2  --  4.2  --  4.4   HGB 7.6*   < > 6.9* 7.7* 7.6*   HCT 22.5*   < > 20.7* 22.5* 21.9*     --  231  --  235    < > = values in this interval not displayed. BMP:    Recent Labs     22  0539 03/10/22  0507    135* 138   K 3.7 3.5 3.1*    102 106   CO2 22 23 21   BUN 38* 37* 32*   CREATININE 2.9* 2.9* 2.4*   GLUCOSE 199* 196* 206*     Hepatic: No results for input(s): AST, ALT, ALB, BILITOT, ALKPHOS in the last 72 hours. Mag:    No results for input(s): MG in the last 72 hours. Phos:     Recent Labs     22  0539 03/10/22  0507   PHOS 3.8 3.3 2.9      INR: No results for input(s): INR in the last 72 hours. ASSESSMENT AND PLAN:  42 y.o. male status post pyloroplasty with oversewing of bleeding duodenal ulcer. Encourage diet and activity. Likely to rehab soon.   Okay for discharge from surgical standpoint when disposition has been determined and he is considered medically stable        Electronically signed by Dipti Holguin MD

## 2022-03-10 NOTE — PROGRESS NOTES
Hand off report left for Rica  , SURESH. Patient is stable showing no signs of distress and has no current needs at this time. Call light is in reach and bed is in lowest position. Care is transferred at this time.

## 2022-03-10 NOTE — PROGRESS NOTES
End of shift report given to [address] [facility] transferred  -  Patient asleep w/ resp easy and even.   Call light in patient's reach

## 2022-03-10 NOTE — PROGRESS NOTES
[facility] Progress Note           Subjective/   42y.o. year old male who we are seeing in consultation for RODRICK requiring HD. HPI:  Doing well and getting discharged     ROS:  +weak, intake improving, denies any shortness of breath. Objective/   GEN:  Chronically ill, BP (!) 175/88   Pulse 106   Temp 98.5 °F (36.9 °C) (Oral)   Resp 20   Ht 6' 1\" (1.854 m)   Wt 255 lb 1.6 oz (115.7 kg)   SpO2 97%   BMI 33.66 kg/m²   HEENT: non-icteric, no JVD  CV: S1, S2 without m/r/g; no LE edema  RESP: CTA B without w/r/r; breathing wnl  ABD: +bs, soft, nt, no hsm  SKIN: warm, no rashes  ACCESS: R IJ vascath    Data/  Recent Labs     03/08/22  0440 03/08/22  0440 03/09/22  0539 03/09/22  1620 03/10/22  0507   WBC 5.2  --  4.2  --  4.4   HGB 7.6*   < > 6.9* 7.7* 7.6*   HCT 22.5*   < > 20.7* 22.5* 21.9*   MCV 89.9  --  89.8  --  89.2     --  231  --  235    < > = values in this interval not displayed.      Recent Labs     03/08/22  0440 03/09/22  0539 03/10/22  0507    135* 138   K 3.7 3.5 3.1*    102 106   CO2 22 23 21   GLUCOSE 199* 196* 206*   PHOS 3.8 3.3 2.9   BUN 38* 37* 32*   CREATININE 2.9* 2.9* 2.4*   LABGLOM 24* 24* 29*   GFRAA 29* 29* 36*       Assessment/     -RODRICK likely related to prerenal factors in the setting of sepsis, use of diuretics and losartan, later cardiac arrest contributing to multifactorial ATN. Stephie Leash is not suggestive of staph associated glomerulonephritis.  Patient did not respond to IV fluids and developed acute pulmonary edema and renal replacement therapy initiated on 1/23/22; last HD was on 3/1        Recovering renal function      -Acute pulmonary edema            Resolved with dialysis / now euvolemic      -S/P Cardio respiratory arrest                -Sepsis with MSSA bacteremia with left foot abscess s/p drainage, osteomyelitis, left hand I&D            Status post debridement on 3/8     -Anemia - prn prbc's           1 on 2/28 and 1 on 3/1 and 1 on 3/6     -Diabetes, uncontrolled     -Hypertension     -Weakness:  Prolonged ICU stay following MSSA bacteremia complicated by cardiac arrest.  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  s/p IR embolization of the gastroduodenal artery  On 2/25/22.  CT angiogram with no active bleeding. OR w exp lap and oversew of DU, pyloroplasty on 2/27/22   S/p multiple units of blood transfusion ; unremarkable upper GI series on 3/3       Plan/     -Renal function continues to improve;monitor labs and clinical parameters  -ok to dc vasc cath   -Serial renal panel  -daily wts and strict i/o  -renal dose medications   -avoid nephrotoxins    Noted that pt is being discharged to [facility] at [address]     ____________________________________  Raul Vickers MD  [facility]  [email or web address]  Office: [phone]

## 2022-03-10 NOTE — PROGRESS NOTES
HD catheter removed no complications, MARIANNE picc line in place, rea in place, rectal tube in place, woundcare treatments completed, pt in stable conditions awaiting patient transport to [facility]. Patient belongings sent with spouse.

## 2022-03-10 NOTE — FLOWSHEET NOTE
03/10/22 0000   Vital Signs   Temp 98.7 °F (37.1 °C)   Temp Source Oral   Pulse 89   Heart Rate Source Monitor   Resp 20   BP (!) 154/81   BP Location Left upper arm   Patient Position Semi fowlers   Level of Consciousness Alert (0)   MEWS Score 1   Pain Assessment   Pain Assessment 0-10   Pain Level 8   Pain Type Acute pain   Pain Location Shoulder   Pain Orientation Left;Right   Pain Descriptors Aching;Discomfort   Pain Frequency Continuous   Pain Onset On-going   Clinical Progression Gradually worsening   Functional Pain Assessment Prevents or interferes some active activities and ADLs   CPOT (Critical Patient)   O2 Device None (Room air)   Oxygen Therapy   SpO2 98 %   Pulse Oximeter Device Mode Continuous   Pulse Oximeter Device Location Finger   Height and Weight   Weight 255 lb 1.6 oz (115.7 kg)   Weight Method Bed scale   BMI (Calculated) 33.7     Vitals taken, shown above. . Provided patient with a drink, repositioned, emptied estuardo and changed rectal tube bag. Patient is stable with no current needs at this time Call light in reach. Bed in lowest position.

## 2022-03-10 NOTE — CARE COORDINATION
DISCHARGE ORDER  Date/Time 3/10/2022 10:30 AM  Completed by: Shannen Bradshaw RN, Case Management    Patient Name: Darwin Schneider      : 1975  Admitting Diagnosis: Hyperglycemia [R73.9]  RODRICK (acute kidney injury) ([facility] [address] [N17.9]  Acute renal failure, unspecified acute renal failure type ([address] [N17.9]  Syncope, unspecified syncope type [R55]      Admit order Date and Status:INPT 22  (verify MD's last order for status of admission)      Noted discharge order. If applicable PT/OT recommendation at Discharge:   DME recommendation by PT/OT: SNF  Confirmed discharge plan : Yes  with whom__with pt_____________  If pt confirmed DC plan does family need to be contacted by CM Yes if yes who_pt wife aware_____  Discharge Plan: [facility] to [facility] for a  time of 12 Noon with [facility]. Report to be called to [phone]. Reviewed chart. Role of discharge planner explained and patient verbalized understanding. Discharge order is noted. Has Home O2 in place on admit:  No  Informed of need to bring portable home O2 tank on day of discharge for nursing to connect prior to leaving:   Not Indicated  Verbalized agreement/Understanding:   Not Indicated  Pt is being d/c'd to [facility] today. Pt's O2 sats are 97% on [facility]. Discharge timeout done with nErique PRINCE. All discharge needs and concerns addressed.

## 2022-03-10 NOTE — FLOWSHEET NOTE
03/10/22 0830   Vital Signs   Temp 98.5 °F (36.9 °C)   Temp Source Oral   Pulse 106   Heart Rate Source Monitor   Resp 20   BP (!) 175/88   BP Location Right lower arm   Patient Position Semi fowlers   Level of Consciousness Alert (0)   MEWS Score 2   Oxygen Therapy   SpO2 97 %   Pulse Oximeter Device Mode Continuous   Pulse Oximeter Device Location Finger   Skin Assessment Clean, dry, & intact   Skin Protection for O2 Device N/A   O2 Flow Rate (L/min) 0 L/min     Shift assessment complete - see flow sheet, wound care completed, bath completed, A&Ox4, pt refused to be repositioned, bilateral SCD pumps on, fall precautions in place, pt. Denies needs, call light within reach.
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
## 2022-03-10 NOTE — PROGRESS NOTES
Report called to Sonia mckeon nurse from [facility] at [facility]. Call back number provided in case any further questions was to arise.
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
## 2022-03-17 NOTE — OP NOTE
[address]                 [address] [place] [address]                                OPERATIVE REPORT    PATIENT NAME: Jamar Johnson                   :        1975  MED REC NO:   [id number]                          ROOM:         ACCOUNT NO:   [de-identified]                           ADMIT DATE: 2022  PROVIDER:     Liseth Booker DPM    DATE OF PROCEDURE:  2022    PREOPERATIVE DIAGNOSES:  1. Diabetic foot ulceration, left foot. 2.  Osteomyelitis, left foot. 3.  Diabetes mellitus. POSTOPERATIVE DIAGNOSES:  1. Diabetic foot ulceration, left foot. 2.  Osteomyelitis, left foot. 3.  Diabetes mellitus. OPERATION PERFORMED:  Ulcer debridement, left foot. SURGEON:  Liseth Booker DPM    ANESTHESIA:  Local with MAC. HEMOSTASIS:  Ankle tourniquet 250 mmHg. ESTIMATED BLOOD LOSS:  Less than 100 mL. REPORT OF OPERATION:  The patient was brought into the operating room  and placed on the operating table in the supine position. Under mild IV  sedation, the left midfoot was anesthetized with 20 mL of a 1:1 mixture  of 1% lidocaine plain and 0.5% Marcaine plain. The foot was then  scrubbed, prepped, and draped in the usual sterile manner. Esmarch was  then utilized to exsanguinate the left foot. Ankle tourniquet was then  inflated to 250 mmHg. Attention was then directed to the dorsal aspect of the left foot, where  the ulceration was identified. There was obviously exposed bone and  tendon within the wound bed itself. Probing of the bone did reveal lose  fragments within the surgical site and the bone was discolored  consistent with his preoperative osteomyelitis/bone infection diagnosis. There was some necrosis of the extensor tendons as well. At this time,  the scalpel was then utilized to ellipse the wound bed in  full-thickness.   Scalpel was then utilized to continue to excise all  fibrotic, necrotic, nonviable, viable tissues excising this portion of  the skin, subcutaneous tissue and extensor tendons. Dissection was  carried down in order to expose the bone deep within the wound bed  itself. This discolored bone was then excised with rongeur. It was  sent for pathological evaluation. There continued to be some exposed  bone proximally and distally. However, this bone was probed and was  noted to be mid to half bleeding tissue, bleeding from the bone itself. This bone was also more healthy colored; therefore, it was left intact. The midfoot was definitively consistent with his preoperative Charcot  diagnosis as well. Post debridement, this ulceration measured  approximately 5 x 3 cm. The wound was ultimately excised of skin,  subcutaneous tissue, tendon and bone excising 15 cm2 with a scalpel and  rongeur. Surgical site was then copiously irrigated with sterile saline  via the pulse lavage. All bleeders were cauterized as necessary. Surgical site was inspected. No further infected bone or tissue was  noted. Surgical site was then packed with iodoform, covered with gauze,  fluffs, Kerlix, ABD, and Coban. Tourniquet was then deflated. The patient tolerated the procedure and anesthesia well and transferred  to the recovery room with vital signs stable and vascular status intact  as evidenced from a prompt hyperemic response to the digits of the left  foot. Following a brief period of postoperative monitoring, the patient  will be transferred back to the floor under the care of the Medical  Service.         YAMILET FENG DPM    D: 03/17/2022 10:17:19       T: 03/17/2022 16:26:29     [email or web address]  Job#: [id number]     Doc#: [id number]    CC:

## 2022-04-13 PROBLEM — G72.81 CRITICAL ILLNESS MYOPATHY: Status: ACTIVE | Noted: 2022-01-01

## 2022-04-13 NOTE — PROGRESS NOTES
NURSING ASSESSMENT: [facility]    Patient:Clint Osei     Rehab Dx/Hx: Critical illness myopathy [G72.81]   :1975  OZF:[id number]  Date of Admit: 2022  Room #: 0167/0167-01    Subjective:   Patient admitted to room 167@ 1600 from [facility] via ambulance transport. Alert and oriented x4. Oriented to room and call light system. Oriented to rehab routine and therapy schedules. Informed about care conferences and ordering of meals with PCA. Drug / Medication Review:   Medications were reviewed by RN at time of admission  []  No potential or actual clinically significant medication issues were noted. []  Potential or actual clinically significant medication issues were found and MD was notified. 4 Eyes Skin Assessment   The patient is being assessed for: Admission     I agree that 2 RN's have performed a thorough Head to Toe Skin Assessment on the patient. ALL assessment sites listed below have been assessed. Areas assessed by both nurses:   [x]   Head, Face, and Ears   [x]   Shoulders, Back, and Chest, Abdomen  [x]   Arms, Elbows, and Hands   [x]   Coccyx, Sacrum, and Ischium  [x]   Legs, Feet, and Heel     Does the Patient have Skin Breakdown?   Yes          Shimon Prevention initiated:  Yes   Wound Care Orders initiated:  Yes       [address]  nurse consulted for Pressure Injury (Stage 3,4, Unstageable, DTI, NWPT, Complex wounds)and New or Established Ostomies:  Yes     Primary Nurse eSignature: Aldo Soliman RN  Co-signer eSignature:
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
## 2022-04-14 PROBLEM — E44.0 MODERATE MALNUTRITION (HCC): Chronic | Status: ACTIVE | Noted: 2022-01-01

## 2022-04-14 NOTE — CONSULTS
[facility] Nurse  Consult Note       NAME:  Merlin Mayans  MEDICAL RECORD NUMBER:  [id number]  AGE: 42 y.o. GENDER: male  : 1975  TODAY'S DATE:  2022    Subjective  I tripped and fell, didn't notice anything wrong for couple days due to no sensation in feet. Went to [facility], they found foot broken and red spot on top of foot which turned out to be infection. Reason for WOCN Evaluation and Assessment: Stage IV buttocks, Left foot ordes for NPWT   Area to left hand from burn. Merlin Mayans is a 42 y.o. male referred by:   [] Physician  [x] Nursing  [] Other:     Wound Identification:   Wound Type: pressure and traumatic  Contributing Factors: edema, diabetes, chronic pressure, decreased mobility, shear force, obesity, decreased tissue oxygenation, incontinence of stool and incontinence of urine    Wound History: 42year old male admitted from [facility] for rehabilitation after prolonged hospital stay secondary to [facility]. He had an MSSA bacteremia from his foot abscess and is s/p debridement 3/8/22 with placement of wound VAC. Pressure injury started as deep tissue injury on 2022. Now has gone from unstageable to stage 4. Current Wound Care Treatment:  Top of foot moist to dry dressing hanging loosely to foot. Foam dressing covering sacrum/coccyx. Wound red, moist, slough to upper deep wound bed. Minor seepage serous.      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)     acute dialysis while in hospital. resolved    History of blood transfusion     History of hyperbaric oxygen therapy 10/28/2020    DFU- carmichael 3 - compromised flap    Hyperlipidemia     Hypertension     Infective tenosynovitis of extensor tendons of left hand 2/3/2022    Migraine     MSSA bacteremia 1/23/2022    Possible perforated tympanic membrane     as a result of recurrent otitis    Post-op hematoma of left foot 11/12/2020    Recurrent otitis media     Septic shock (HCC)     Staphylococcal arthritis of left foot ([address] 2/1/2022    Syncope     Tear of medial meniscus of left knee     Tobacco use     Toe osteomyelitis, left ([address] 7/24/2020    VAP (ventilator-associated pneumonia) ([address]        PAST SURGICAL HISTORY    Past Surgical History:   Procedure Laterality Date    ARM SURGERY Left 02/05/2022    LEFT HAND INCISION AND DRAINAGE performed by Shan Lockhart MD at [address], ESOPHAGUS      bleeding ulcer correction    ENDOSCOPY, COLON, DIAGNOSTIC      FOOT DEBRIDEMENT Left 02/01/2022    ULCER DEBRIDEMENT LEFT FOOT performed by Lizbet Tovar DPM at [address] Left 3/8/2022    ULCER DEBRIDEMENT LEFT FOOT performed by Lizbet Tovar DPM at [address] IR EMBOLIZATION HEMORRHAGE Right 02/25/2022    successful coil embolization of the gastroduodenal artery    IR NONTUNNELED VASCULAR CATHETER  2022    IR NONTUNNELED VASCULAR CATHETER [place] [facility] [place] [facility] [place]    KNEE ARTHROSCOPY Left 08/15/2013    LEFT KNEE ARTHROSCOPY , SYNOVECTOMY       LAPAROTOMY N/A 2022    PYLOROPLASTY, ULCER OVER-SEW, JEJUNOSTOMY TUBE INSERTION performed by Fabi Zapien MD at [address] Left 2020    PARTIAL LEFT FOOT AMPUTATION    TOE AMPUTATION Right 2019    PARTIAL RIGHT FOOT AMPUTATION performed by Kaylynn Gaviria DPM at [facility] TOE AMPUTATION Left 2020    PARTIAL LEFT FOOT AMPUTATION performed by Kaylynn Gaviria DPM at [facility] TOE AMPUTATION Left 10/22/2020    PARTIAL LEFT FOOT AMPUTATION WITH GRAFT APPLICATION performed by Kaylynn Gaviria DPM at [address] [place] [address] ENDOSCOPY N/A 2022    EGD W/ANES.  performed by Clover Martinez MD at [place] [address] N/A 2022    EGD DIAGNOSTIC ONLY performed by Terry Park MD at [place] [address] EXTRACTION         FAMILY HISTORY    Family History   Problem Relation Age of Onset    Diabetes Mother     High Blood Pressure Mother     High Cholesterol Mother     Diabetes Father     High Blood Pressure Father     High Cholesterol Father     Stroke Father     High Blood Pressure Sister     High Blood Pressure Maternal Uncle     High Cholesterol Maternal Uncle     High Blood Pressure Maternal Grandmother        SOCIAL HISTORY    Social History     Tobacco Use    Smoking status: Former Smoker     Packs/day: 0.50     Years: 2.00     Pack years: 1.00     Quit date: 2005     Years since quittin.6    Smokeless tobacco: Former User     Quit date: 2005    Tobacco comment: SMOKED OCCASIONALLY UNTIL 8 YEARS AGO   Vaping Use    Vaping Use: Never used   Substance Use Topics    Alcohol use: Not Currently     Comment: RARELY    Drug use: No       ALLERGIES    Allergies   Allergen Reactions    Januvia [Sitagliptin] Nausea Only     Has taken metformin without side effects in the past.  Nausea with Janumet in the past.     Metformin And Related      GI Upset    Vancomycin      Pt had red face, swelling itching of eyelids, sore throat after receiving vancmomyin and cefepime. I think this was a histamine releasing reaction from vancomycin most likely. The cefepime was switched to Zosyn and patient had no reaction to Zosyn    Mustard Oil [Allyl Isothiocyanate] Swelling and Rash       MEDICATIONS    No current facility-administered medications on file prior to encounter. Current Outpatient Medications on File Prior to Encounter   Medication Sig Dispense Refill    gabapentin (NEURONTIN) 100 MG capsule Take 2 capsules by mouth 3 times daily for 30 days. 180 capsule 0    insulin lispro (HUMALOG) 100 UNIT/ML injection vial Inject 0-18 Units into the skin 4 times daily (before meals and nightly) 10 mL 3    dilTIAZem (CARDIZEM CD) 120 MG extended release capsule Take 1 capsule by mouth daily 30 capsule 3    pantoprazole (PROTONIX) 40 MG tablet Take 1 tablet by mouth 2 times daily (before meals) 60 tablet 1    sucralfate (CARAFATE) 1 GM tablet Take 1 tablet by mouth 4 times daily 120 tablet 1    insulin glargine (LANTUS SOLOSTAR) 100 UNIT/ML injection pen Inject 10 Units into the skin nightly 5 pen 3    tiZANidine (ZANAFLEX) 4 MG tablet TAKE 2 TABLETS BY MOUTH NIGHTLY 180 tablet 0    carvedilol (COREG) 12.5 MG tablet TAKE 1 TABLET BY MOUTH 2 TIMES DAILY (WITH MEALS) 180 tablet 0    rosuvastatin (CRESTOR) 20 MG tablet TAKE 1 TABLET BY MOUTH NIGHTLY 90 tablet 0    traZODone (DESYREL) 50 MG tablet TAKE 1 TABLET BY MOUTH NIGHTLY 90 tablet 0    PARoxetine (PAXIL) 10 MG tablet TAKE 1 TABLET BY MOUTH EVERY MORNING 90 tablet 0    blood glucose test strips (ONETOUCH ULTRA) strip USE TO TEST BLOOD GLUCOSE DAILY.  DX:E11.9 100 strip 0    Blood Glucose Monitoring Suppl (CONTOUR NEXT EZ) w/Device KIT Use to test glucose daily. DX:E11.9 1 kit 0    Insulin Pen Needle 32G X 6 MM MISC Use with insulin daily . DX:E11.9 100 each 3    blood glucose monitor strips Use to test blood sugar daily. DX:E11.9 100 strip 3    loratadine (CLARITIN) 10 MG tablet Take 10 mg by mouth nightly as needed          Objective Peripheral IV to right upper arm, capped. Munoz in place with yellow urine. Dolphin mattress to bed. Dressings on loosely to left foot and buttocks. BP (!) 175/91   Pulse 75   Temp 98 °F (36.7 °C) (Oral)   Resp 16   Ht 6' (1.829 m)   Wt 236 lb 1.8 oz (107.1 kg)   SpO2 94%   BMI 32.02 kg/m²     LABS:  WBC:    Lab Results   Component Value Date    WBC 5.4 04/14/2022     H/H:    Lab Results   Component Value Date    HGB 8.3 04/14/2022    HCT 23.9 04/14/2022     PTT:    Lab Results   Component Value Date    APTT 67.1 01/24/2022   [APTT}  PT/INR:    Lab Results   Component Value Date    PROTIME 13.4 02/26/2022    INR 1.18 02/26/2022     HgBA1c:    Lab Results   Component Value Date    LABA1C 10.6 01/23/2022       Assessment; Left foot red with granulation, edges attached. Shanita tissue blanchable. Edema to left foot present. Coccyx/sacrum wound red, seeping minor serous drainage. Slough to upper deep wound bed to sacrum. Left hand red, scarred, old burn area. Posterior lower scalp pink healing area.    Shimon Risk Score: Shimon Scale Score: 14    Patient Active Problem List   Diagnosis    Hypertension    Uncontrolled type 2 diabetes mellitus with diabetic polyneuropathy (HCC)    Cardiomyopathy ([address]    Mixed hyperlipidemia    Allergic rhinitis    Osteoarthritis    Acute osteomyelitis of left foot (HCC)    Acute renal failure (HCC)    Staph aureus infection    Third degree burn of left hand    Antibiotic-associated diarrhea    Pressure injury of deep tissue of sacral region    Severe protein-calorie malnutrition (HCC)    Paroxysmal atrial fibrillation (HCC)    Duodenal ulcer    Diabetic ulcer of left midfoot associated with type 2 diabetes mellitus, with necrosis of bone (HCC)    Probable abscess of upper lobe of right lung without pneumonia (HCC)    Gastrointestinal hemorrhage    ABLA (acute blood loss anemia)    MSSA bacteremia    Critical illness myopathy       Measurements:  Negative Pressure Wound Therapy Foot Left;Dorsal (Active)   $ Standard NPWT >50 sq cm PER TX $ Yes 04/14/22 1108   Wound Type Pressure ulcer: Stage IV;Surgical 04/14/22 1108   Unit Type [employer] [place] [id number] 04/14/22 1108   Dressing Type Black foam 04/14/22 1108   Number of pieces used 3 04/14/22 1108   Cycle Continuous 04/14/22 1108   Target Pressure (mmHg) 125 04/14/22 1108   Intensity 1 04/14/22 1108   Canister changed? Yes 04/14/22 1108   Dressing Status Changed 04/14/22 1108   Dressing Changed Changed/New 04/14/22 1108   Drainage Amount Small 04/14/22 1108   Drainage Description Serosanguinous 04/14/22 1108   Dressing Change Due 04/18/22 04/14/22 1108   Wound Assessment Pink;Slough 04/14/22 1108   Shape foot oval, coccyx heart shaped 04/14/22 1108   Odor None 04/14/22 1108   Number of days: 71       Wound 01/24/22 Hand Left;Dorsal (Active)   Wound Image   04/14/22 1108   Wound Etiology Burn 04/14/22 1108   Wound Cleansed Cleansed with saline 04/14/22 1108   Dressing/Treatment Open to air 04/14/22 1108   Distance Tunneling (cm) 0 cm 04/14/22 1108   Tunneling Position ___ O'Clock 0 04/14/22 1108   Undermining Starts ___ O'Clock 0 04/14/22 1108   Undermining Ends___ O'Clock 0 04/14/22 1108   Undermining Maxium Distance (cm) 0 04/14/22 1108   Wound Assessment Pink/red 04/14/22 1108   Drainage Amount None 04/14/22 1108   Odor None 04/14/22 1108   Shanita-wound Assessment Dry/flaky; Intact 04/14/22 1108   Margins Attached edges; Defined edges 04/14/22 1108   Number of days: 80      Hand Left;            Wound 01/24/22 Foot Left;Dorsal I & D to left dorsal foot by Dr. Ashly Rainey, surgical wound (Active)   Number of days: 80       Wound 01/30/22 Sacrum SDTI (Active)   Wound Image   04/14/22 1108   Wound Etiology Pressure Stage  4 04/14/22 1108   Dressing Status New dressing applied 04/14/22 1108   Wound Cleansed Irrigated with saline 04/14/22 1108   Dressing/Treatment Barrier film;Hydrocolloid 04/14/22 1108   Offloading for Diabetic Foot Ulcers Offloading ordered; Other (comment) 04/14/22 1108   Dressing Change Due 04/18/22 04/14/22 1108   Wound Length (cm) 11 cm 04/14/22 1108   Wound Width (cm) 8 cm 04/14/22 1108   Wound Depth (cm) 2.5 cm 04/14/22 1108   Wound Surface Area (cm^2) 88 cm^2 04/14/22 1108   Change in Wound Size % (l*w) -340 04/14/22 1108   Wound Volume (cm^3) 220 cm^3 04/14/22 1108   Wound Healing % -1518 04/14/22 1108   Distance Tunneling (cm) 0 cm 04/14/22 1108   Tunneling Position ___ O'Clock 0 04/14/22 1108   Undermining Starts ___ O'Clock 0 04/14/22 1108   Undermining Ends___ O'Clock 0 04/14/22 1108   Undermining Maxium Distance (cm) 0 04/14/22 1108   Wound Assessment Pink/red;Eschar less than 20% 04/14/22 1108   Drainage Amount Small 04/14/22 1108   Drainage Description Serosanguinous 04/14/22 1108   Odor None 04/14/22 1108   Shanita-wound Assessment Dry/flaky; Intact 04/14/22 1108   Margins Attached edges; Defined edges 04/14/22 1108   Wound Thickness Description not for Pressure Injury Full thickness 04/14/22 1108   Number of days: 74       Sacrum SDTI         Wound 02/14/22 Head Left; Lower; Posterior Friction/pressure wound-unstageable.  4/14/22 healed now, will complete LDA per [facility] (Active)   Wound Image   04/14/22 1115   Wound Etiology Other 04/14/22 1115   Wound Cleansed Cleansed with saline 04/14/22 1115   Dressing/Treatment Open to air 04/14/22 1115   Wound Length (cm) 1.4 cm 04/14/22 1115   Wound Width (cm) 3.5 cm 04/14/22 1115   Wound Depth (cm) 0 cm 04/14/22 1115   Wound Surface Area (cm^2) 4.9 cm^2 04/14/22 1115   Change in Wound Size % (l*w) 56.44 04/14/22 1115   Wound Volume (cm^3) 0 cm^3 04/14/22 1115 Wound Healing % 100 04/14/22 1115   Distance Tunneling (cm) 0 cm 04/14/22 1115   Tunneling Position ___ O'Clock 0 04/14/22 1115   Undermining Starts ___ O'Clock 0 04/14/22 1115   Undermining Ends___ O'Clock 0 04/14/22 1115   Undermining Maxium Distance (cm) 0 04/14/22 1115   Wound Assessment Pink/red 04/14/22 1115   Drainage Amount None 04/14/22 1115   Odor None 04/14/22 1115   Shanita-wound Assessment Dry/flaky; Intact 04/14/22 1115   Margins Attached edges; Defined edges 04/14/22 1115   Number of days: 59   Head Left; Lower        Incision 02/27/22 Abdomen (Active)   Wound Image   04/14/22 1108   Incision Cleansed Cleansed with saline 04/14/22 1108   Dressing/Treatment Open to air 04/14/22 1108   Incision Length (cm) 11 04/14/22 1108   Incision Width (cm) 0 cm 04/14/22 1108   Incision Depth (cm) 0 cm 04/14/22 1108   Margins Other (Comment) 04/14/22 1108   Incision Assessment Dry 04/14/22 1108   Drainage Amount None 04/14/22 1108   Odor None 04/14/22 1108   Number of days: 45      Abdomen         Incision 03/08/22 Foot Anterior; Left (Active)   Wound Image   04/14/22 1108   Dressing Status New dressing applied 04/14/22 1108   Dressing Change Due 04/18/22 04/14/22 1108   Incision Cleansed Cleansed with saline 04/14/22 1108   Dressing/Treatment Barrier film;Negative pressure wound therapy 04/14/22 1108   Incision Length (cm) 2 04/14/22 1108   Incision Width (cm) 6 cm 04/14/22 1108   Incision Depth (cm) 0.9 cm 04/14/22 1108   Margins Other (Comment) 04/14/22 1108   Incision Assessment Epithelialization 04/14/22 1108   Drainage Amount Small 04/14/22 1108   Drainage Description Serosanguinous 04/14/22 1108   Odor None 04/14/22 1108   Shanita-incision Assessment Dry/flaky; Intact 04/14/22 1108   Number of days: 36     Foot Anterior; Left         Response to treatment:  Well tolerated by patient.      Pain Assessment:  Severity:  0 / 10  Quality of pain: N/A  Wound Pain Timing/Severity: none  Premedicated: No    Plan   Plan of Care: Wound 01/30/22 Sacrum SDTI-Dressing/Treatment: Barrier film,Hydrocolloid  Wound 02/14/22 Head Left; Lower; Posterior Friction/pressure wound-unstageable. 4/14/22 healed now, will complete LDA per [facility]-Dressing/Treatment: Open to air  Wound 01/24/22 Hand Left;Dorsal-Dressing/Treatment: Open to air    First dressing changes initiated, Wound VAC attached. Recommendation;  Clean wounds with normal saline. NPWT to mid coccyx and left dorsal foot. Change 3 times a week. Clean feet and legs with warm soapy water, foamy cleanser or bath wipes daily. Pat dry. Apply aquapor ointment to feet and legs daily. Wound care to follow. Call wound care for deterioration [phone] or call [phone] and leave message. Unit manager, Gorge Young, updated on treatments. Specialty Bed Required : Yes Dolphin  [x] Low Air Loss   [x] Pressure Redistribution  [] Fluid Immersion  [] Bariatric  [] Total Pressure Relief  [] Other:     Current Diet: ADULT DIET; Regular; 4 carb choices (60 gm/meal)  Dietician consult:  Yes    Discharge Plan:  Placement for patient upon discharge: Unknown at this time. Patient appropriate for Outpatient [address]: Yes    Referrals:  [x]  / discharge planner following  [] [address] [employer] [facility]  [] Supplies  [] Other    Patient/Caregiver Teaching: Instructions provided on importance of repositioning to offload pressure from wounds. Educated patient to be careful of VAC tubing as well as urinary tubing that it does not become tangled or kinked or laid on causing pressure injury.   Level of patient/caregiver understanding able to:   [] Indicates understanding       [] Needs reinforcement  [] Unsuccessful      [x] Verbal Understanding  [] Demonstrated understanding       [] No evidence of learning  [] Refused teaching         [] N/A       Electronically signed by Monica Barakat RN, BSN on 4/14/2022 at 12:14 PM

## 2022-04-14 NOTE — PLAN OF CARE
Problem: Nutrition  Goal: Optimal nutrition therapy  Outcome: Ongoing  Note: Nutrition Problem #1: Inadequate oral intake  Intervention: Food and/or Nutrient Delivery: Continue Current Diet,Start Oral Nutrition Supplement  Nutritional Goals: Pt will have po intakes 50% or greater during ARU stay

## 2022-04-14 NOTE — CONSULTS
Comprehensive Nutrition Assessment    Type and Reason for Visit:  Initial,Positive Nutrition Screen,Consult    Nutrition Recommendations/Plan:   1. Continue carb control diet  2. Glucerna BID  3. Encourage nutrition  4. Monitor po intakes, nutrition adequacy, weights, pertinent labs, BMs    Nutrition Assessment:  Consult for supplement recommendations. Positive nutrition screen for weight loss, decreased appetite, and wounds. New ARU pt. . Pt admitted with \"Critical illness myopathy\". PMHx of DM, CHF. Pt currently on a carb control diet with po intakes % per EMR. Pt reports that his appetite is normally okay, but it has been reduced today d/t pain. Pt endorses having some weight loss. Unable to specify time frame. Noted ~19 lb loss x 3 months per EMR (7.5% loss). Pt favorable to adding ONS to help him meet nutrition needs, will order. Encouraged po intakes. Pt declined need for diet education. Will monitor. Malnutrition Assessment:  Malnutrition Status: Moderate malnutrition    Context:  Chronic Illness     Findings of the 6 clinical characteristics of malnutrition:  Energy Intake:  7 - 75% or less estimated energy requirements for 1 month or longer  Weight Loss:  7 - 7.5% over 3 months     Body Fat Loss:  No significant body fat loss     Muscle Mass Loss:  No significant muscle mass loss    Fluid Accumulation:  No significant fluid accumulation     Strength:  Not Performed    Estimated Daily Nutrient Needs:  Energy (kcal):  5315-1487; Weight Used for Energy Requirements:  Ideal (80.9 kg)     Protein (g):  80-97; Weight Used for Protein Requirements:  Ideal        Fluid (ml/day):   ; Method Used for Fluid Requirements:  1 ml/kcal      Nutrition Related Findings:  Blind in right eye. Missing teeth. Last BM 4/13      Wounds:  Stage IV,Pressure Injury,Surgical Incision,Burns,Wound Vac       Current Nutrition Therapies:    ADULT DIET;  Regular; 4 carb choices (60 gm/meal)  ADULT ORAL NUTRITION SUPPLEMENT; Breakfast, Dinner; Diabetic Oral Supplement    Anthropometric Measures:  · Height: 6' (182.9 cm)  · Current Body Weight: 236 lb 1.8 oz (107.1 kg)   · Ideal Body Weight: 178 lbs; % Ideal Body Weight     · BMI: 32  · BMI Categories: Obese Class 1 (BMI 30.0-34. 9)       Nutrition Diagnosis:   · Inadequate oral intake related to pain as evidenced by poor intake prior to admission    Nutrition Interventions:   Food and/or Nutrient Delivery:  Continue Current Diet,Start Oral Nutrition Supplement  Nutrition Education/Counseling:  Education declined   Coordination of Nutrition Care:  Continue to monitor while inpatient,Interdisciplinary Rounds    Goals:  Pt will have po intakes 50% or greater during ARU stay       Nutrition Monitoring and Evaluation:   Behavioral-Environmental Outcomes:  None Identified   Food/Nutrient Intake Outcomes:  Food and Nutrient Intake,Supplement Intake  Physical Signs/Symptoms Outcomes:  Biochemical Data,Weight     Discharge Planning:    Continue current diet     Electronically signed by Rl Valdez RD, LD on 4/14/22 at 3:33 PM EDT    Contact: [phone]

## 2022-04-14 NOTE — PROGRESS NOTES
Physical Therapy  [facility]  Initial Assessment    NAME: Lucia Moe  : 1975  MRN: [id number]    Date of Service: 2022    Discharge Recommendations:  Continue to assess pending progress,24 hour supervision or assist,Home with Home health PT   PT Equipment Recommendations  Other: TBD pending progress and increasead functional mobility assessment    Assessment   Body structures, Functions, Activity limitations: Decreased functional mobility ; Decreased strength;Decreased endurance;Decreased balance;Decreased sensation; Increased pain  Assessment: Pt is a 42year old male admitted to ARU from [place] [address] after prolonged hospital stay, per H&P \"secondary to COVID including intubation for acute cardiac arrest, extubated 22, GI bleed requiring multiple blood transfusion and surgery. 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 On 22 respiratory cx with Candida albicans. He had an MSSA bacteremia from his foot abscess and is s/p debridement 3/8/22 with placement of would vac. Now with unstageable decubitus ulcer coccyx. \" Pt currently completes bed mobility with mod A (CGA for rolling) and sit<>stand transfers with max A x 2 using Stedy (although pt only able to achieve ~50% of upright). Demo's sitting balance on EOB with SBA-CGA. He is limited by pain, deconditioning, generalized weakness, and sensation deficits, however he is motivated to participate and increase independence. Pt is currently functioning below baseline as he was independent prior to initial hospitalization. Will continue to progress and assess functional mobility as appropriate. Treatment Diagnosis: impaired strength and activity tolerance  Prognosis: Good  Decision Making: High Complexity  PT Education: Goals;PT Role;Plan of Care;Transfer Training;General Safety; Functional Mobility Training  Patient Education: Educated pt on role of PT, ARU POC/goals, safety, importance of mobility, bed mobility, progression of mobility, transfers; pt verbalizes understanding  REQUIRES PT FOLLOW UP: Yes  Activity Tolerance  Activity Tolerance: Patient Tolerated treatment well;Patient limited by pain  Activity Tolerance: Vitals seated EOB: /92, HR 85, O2 sats 96% on room air       Patient Diagnosis(es): There were no encounter diagnoses. has a past medical history of Abscess of left foot, Abscess of left hand, Acute encephalopathy, Acute hypoxemic respiratory failure ([address] Acute osteomyelitis of left hand ([address] Acute osteomyelitis of right hallux ([address] Cardiopulmonary arrest ([address] Cellulitis of left foot, CHF (congestive heart failure) ([address] Chronic osteomyelitis of left foot ([address] Closed displaced fracture of distal phalanx of right little finger, Clostridium difficile infection, Diabetic ulcer of left forefoot associated with type 2 diabetes mellitus, with necrosis of bone ([address] Diabetic ulcer of right great toe associated with type 2 diabetes mellitus, with necrosis of bone ([address] Enterococcal infection, 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 has a past surgical history that includes Tonsillectomy; [place] tooth extraction; Knee arthroscopy (Left, 08/15/2013); Toe amputation (Right, 08/23/2019); other surgical history (Left, 07/24/2020); Toe amputation (Left, 07/24/2020); Toe amputation (Left, 10/22/2020);  Foot Currently in Pain: Yes  Pre Treatment Pain Screening  Intervention List: Patient able to continue with treatment    Orientation  Orientation  Overall Orientation Status: Within Functional Limits     Social/Functional History  Social/Functional History  Lives With: Spouse  Type of Home: House  Home Layout: Two level,Bed/Bath upstairs,Able to Live on Main level with bedroom/bathroom (Pt able to live on 1st floor living room, full bath on first floor)  Home Access: Stairs to enter with rails  Entrance Stairs - Number of Steps: 1-2 DANIE  Entrance Stairs - Rails: Both  Bathroom Shower/Tub: Tub/Shower unit,Curtain  Bathroom Toilet: Standard  Bathroom Equipment: Hand-held shower  Bathroom Accessibility: Walker accessible  ADL Assistance: Independent  Homemaking Assistance: Independent  Homemaking Responsibilities: Yes  Meal Prep Responsibility: Primary  Laundry Responsibility: Secondary  Cleaning Responsibility: Secondary  Bill Paying/Finance Responsibility: Primary  Shopping Responsibility: Secondary  Dependent Care Responsibility: Primary  Health Care Management: Primary  Ambulation Assistance: Independent  Transfer Assistance: Independent  Active : Yes  Mode of Transportation: Car,Truck  Occupation: Full time employment  Type of occupation: [employer], [place] , fabrication shop owner, 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  No fall history in past year       Objective     Observation/Palpation  Posture: Fair       Strength RLE  Strength RLE: Exception  Comment: pt with no active movement to R ankle (baseline per pt since R hallux amp)  R Hip Flexion: 2-/5  R Hip ABduction: 2-/5  R Hip ADduction: 2-/5  R Knee Flexion: 3/5  R Knee Extension: 3/5  R Ankle Dorsiflexion: 0/5  R Ankle Plantar flexion: 0/5    Strength LLE  Strength LLE: Exception  Comment: pt with no active movement to L ankle (baseline per pt since L hallux amp)  L Hip Flexion: 2-/5  L Hip ABduction: 2-/5  L Hip ADduction: 2-/5  L Knee Flexion: 3/5  L Knee Extension: 3/5  L Ankle Dorsiflexion: 0/5  L Ankle Plantar Flexion: 0/5        Sensation  Overall Sensation Status: Impaired (Pt with baseline neuropathy-- absent sensation to B feet and lower legs; pt reports \"the numbness goes almost to my knees\")  Light Touch: Severe deficits in the RLE; Severe deficits in the LLE     Bed mobility  Rolling to Left: Contact guard assistance  Rolling to Right: Contact guard assistance  Supine to Sit: Moderate assistance (assist for trunk)  Sit to Supine: Moderate assistance (assist with BLE)  Comment: bed mobility completed with HOB flat, increased time     Transfers  Sit to Stand: Dependent/Total (max A x 2 for partial stand using Stedy; pt completes x 4 trials, able to achieve ~50% of upright on each trial, limited by weakness and pain)  Stand to sit: Dependent/Total (max A x 2 using Stedy, decreased eccentric control)  Bed to Chair: Unable to assess (d/t pain, weakness, and inability to fully  [place])     Ambulation  Ambulation?: No (d/t BLE weakness, pain, and inability to fully  [place])    Stairs/Curb  Stairs?: No (d/t BLE weakness, pain, and inability to fully  [place])        Balance  Sitting - Static:  (SBA seated EOB)  Sitting - Dynamic:  (CGA seated EOB)  Standing - Static:  (max A x 2 in [place], partial stand)  Comments: Pt sat EOB x 15 mins with grossly SBA for balance.  Standing in Stedy x 4 total trials (~50% of upright each trial), with standing tolerance ~5 sec, limited by weakness, pain, and fatigue        Plan   Plan  Times per week: 5 out of 7 days/week  Plan weeks: 3 weeks  Current Treatment Recommendations: Strengthening,Balance Training,Functional Mobility Training,Transfer Training,Gait Training,Stair training,Wheelchair Mobility  Exercise Program,Safety Education & Training,Patient/Caregiver Education & Training,Equipment Evaluation, Education, & procurement  Safety Devices  Type of devices:  All fall risk precautions in place,Call light within reach,Bed alarm in place,Left in bed,Nurse notified    Goals  Short term goals  Time Frame for Short term goals: 10 days- 4/22/22  Short term goal 1: Pt will complete bed mobility with min A  Short term goal 2: Pt will complete functional transfers with max A x 1 using LRAD  Short term goal 3: Pt will propel manual w/c 50ft with min A  Short term goal 4: Pt will tolerate gait assessment and appropriate LTG to be set  Long term goals  Time Frame for Long term goals : 3 weeks- 5/4/22  Long term goal 1: Pt will complete bed mobility with supervision  Long term goal 2: Pt will complete functional transfers with min A using LRAD  Long term goal 3: Pt will propel manual w/c 150ft with mod I  Patient Goals   Patient goals : \"to walk again\"       Therapy Time   Individual Concurrent Group Co-treatment   Time In 1230         Time Out 1330         Minutes 60         Timed Code Treatment Minutes: 45 Minutes (15 min eval)       Raciel Aguilar, PT, DPT

## 2022-04-14 NOTE — PLAN OF CARE
Problem: Pain:  Goal: Pain level will decrease  Description: Pain level will decrease  4/14/2022 0044 by Mariana De Anda RN  Outcome: Ongoing    Problem: Pain:  Goal: Control of acute pain  Description: Control of acute pain  Outcome: Ongoing     Problem: Falls - Risk of:  Goal: Will remain free from falls  Description: Will remain free from falls  Outcome: Ongoing     Problem: Skin Integrity:  Goal: Will show no infection signs and symptoms  Description: Will show no infection signs and symptoms  Outcome: Ongoing     Problem: Skin Integrity:  Goal: Absence of new skin breakdown  Description: Absence of new skin breakdown  Outcome: Ongoing

## 2022-04-14 NOTE — H&P
Patient: Becky Nicholas  [id number]  Date: 4/14/2022      Chief Complaint: Critical Illness Myopathy    History of Present Illness/Hospital Course:  Sander Barone is a 42year old male with a past medical history significant for DM2, diabetic foot ulcer with amputation of the right and left great toes, recent history of Covid, endotracheal intubation for cardiac arrest, GIB who was admitted to [facility] on 3/10/22. At the [facility] he was managed for bilateral lower extremity wounds, MSSA bacteremia, left foot abscess, left hand abscess, osteomyelitis, lung abscess. He was treated with antibiotics and is to continue doxycycline through 4/28. He has cardiomyopathy with combined heart failure with EF 35%, managed with diuretics. Course further complicated by blood loss anemia due to duodenal ulcer s/p gastroduodenal artery embolization on 2/25, ex-lap and pyloroplasty on 2/27. He has required multiple blood transfusions. He underwent cholecystectomy on 3/23. He underwent EGD on 4/1 without source of bleeding. Course also notable for atrial fibrillation, unstageable pressure ulcer of his sacrum. He has a wound vac on his sacral wound and left foot wound. He was admitted to [facility] on 4/13/22 due to functional deficits below his baseline. Today he is seen in his room with his family present. He reports severe low back and hip pain, but otherwise denies acute complaints.      Prior Level of Function:  Independent with self care, indoor mobility, stairs, functional cognition    Current Level of Function:  Setup/clean-up for eating  Mod assist for toileting hygiene, sit to lying, lying to sitting on side of bed  Max assist for lower body dressing, putting on/taking off footwear  Dependent for chair/bed transfers     has a past medical history of Abscess of left foot, Abscess of left hand, Acute encephalopathy, Acute hypoxemic respiratory failure ([address] Acute osteomyelitis of left hand ([address] Acute osteomyelitis of right hallux [facility]), Cardiopulmonary arrest ([address] Cellulitis of left foot, CHF (congestive heart failure) ([address] Chronic osteomyelitis of left foot ([address] Closed displaced fracture of distal phalanx of right little finger, Clostridium difficile infection, Diabetic ulcer of left forefoot associated with type 2 diabetes mellitus, with necrosis of bone ([address] Diabetic ulcer of right great toe associated with type 2 diabetes mellitus, with necrosis of bone ([address] Enterococcal infection, 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 has a past surgical history that includes Tonsillectomy; [place] tooth extraction; Knee arthroscopy (Left, 08/15/2013); Toe amputation (Right, 08/23/2019); other surgical history (Left, 07/24/2020); Toe amputation (Left, 07/24/2020); Toe amputation (Left, 10/22/2020); Foot Debridement (Left, 02/01/2022); Arm Surgery (Left, 02/05/2022); IR NONTUNNELED VASCULAR CATHETER > 5 YEARS (02/11/2022); Upper gastrointestinal endoscopy (N/A, 02/17/2022); IR EMBOLIZATION HEMORRHAGE (Right, 02/25/2022); Upper gastrointestinal endoscopy (N/A, 2/27/2022); laparotomy (N/A, 2/27/2022); Foot Debridement (Left, 3/8/2022); Cholecystectomy; Dilatation, esophagus; and Endoscopy, colon, diagnostic. reports that he quit smoking about 16 years ago. He has a 1.00 pack-year smoking history. He quit smokeless tobacco use about 16 years ago. He reports previous alcohol use. He reports that he does not use drugs.     family history includes Diabetes in his father and mother; High Blood Pressure in his father, maternal grandmother, maternal uncle, mother, and sister; High Cholesterol in his father, maternal uncle, and mother; Stroke in his father.     Current Facility-Administered Medications   Medication Dose Route Frequency Provider Last Rate Last Admin    acetaminophen (TYLENOL) tablet 650 mg  650 mg Oral Q6H PRN Jana Loja MD   650 mg at 04/14/22 0458    diclofenac sodium (VOLTAREN) 1 % gel 4 g  4 g Topical 4x Daily PRN Jana Loja MD        doxycycline hyclate (VIBRA-TABS) tablet 100 mg  100 mg Oral BID Jana Loja MD   100 mg at 04/14/22 0804    fluticasone (FLONASE) 50 MCG/ACT nasal spray 2 spray  2 spray Each Nostril Nightly Jana Loja MD   2 spray at 04/13/22 2048    furosemide (LASIX) tablet 40 mg  40 mg Oral BID AC Jana Loja MD   40 mg at 04/14/22 7824    gabapentin (NEURONTIN) capsule 400 mg  400 mg Oral TID Jana Loja MD   400 mg at 04/14/22 0803    heparin (porcine) injection 5,000 Units  5,000 Units SubCUTAneous 3 times per day Jana Loja MD   5,000 Units at 04/14/22 0625    HYDROmorphone (DILAUDID) tablet 1 mg  1 mg Oral Q6H WA Jnaa Loja MD   1 mg at 04/14/22 0803    glucose (GLUTOSE) 40 % oral gel 15 g  15 g Oral PRN Jana Loja MD        glucagon (rDNA) injection 1 mg  1 mg IntraMUSCular PRN Jana Loja MD        dextrose 5 % solution  100 mL/hr IntraVENous PRN Jana Loja MD        insulin glargine (LANTUS) injection vial 15 Units  15 Units SubCUTAneous Nightly Jana Loja MD   15 Units at 04/13/22 2056    insulin lispro (HUMALOG) injection vial 0-18 Units  0-18 Units SubCUTAneous TID [facility] Jana Loja MD   6 Units at 04/14/22 0624    insulin lispro (HUMALOG) injection vial 0-9 Units  0-9 Units SubCUTAneous Nightly Jana Loja MD   5 Units at 04/13/22 2053    bisacodyl (DULCOLAX) suppository 10 mg  10 mg Rectal Daily PRN Jana Loja MD        aluminum & magnesium hydroxide-simethicone (MAALOX) 200-200-20 MG/5ML suspension 30 mL  30 mL Oral Q6H PRN Jana Loja MD        lidocaine 4 % external patch 1 patch  1 patch TransDERmal Daily Jana Loja MD        magnesium oxide (MAG-OX) tablet 800 mg  800 mg Oral TID Jana Loja MD   800 mg at 04/14/22 0806    metoprolol tartrate (LOPRESSOR) tablet 25 mg  25 mg Oral BID Jana Loja MD   25 mg at 04/14/22 0805    OLANZapine (ZYPREXA) tablet 5 mg  5 mg Oral Nightly Jana Loja MD   5 mg at 04/13/22 2049    pantoprazole (PROTONIX) tablet 40 mg  40 mg Oral BID Jana Loja MD   40 mg at 04/14/22 0805    PARoxetine (PAXIL) tablet 20 mg  20 mg Oral Daily Jana Loja MD   20 mg at 04/14/22 0806    polyethylene glycol (GLYCOLAX) packet 17 g  17 g Oral Daily PRN Jana Loja MD        therapeutic multivitamin-minerals 1 tablet  1 tablet Oral Daily Jana Loja MD        [facility]) tablet 17.2 mg  2 tablet Oral BID Jana Loja MD   17.2 mg at 04/13/22 2049    tamsulosin (FLOMAX) capsule 0.4 mg  0.4 mg Oral Nightly Jana Loja MD   0.4 mg at 04/13/22 2048    tiZANidine (ZANAFLEX) tablet 8 mg  8 mg Oral BID Jana Loja MD   8 mg at 04/14/22 0805    dextrose bolus (hypoglycemia) 10% 125 mL  125 mL IntraVENous PRN Jana Loja MD        Or    dextrose bolus (hypoglycemia) 10% 250 mL  250 mL IntraVENous PRN Jana Loja MD           Allergies   Allergen Reactions    Januvia [Sitagliptin] Nausea Only     Has taken metformin without side effects in the past.  Nausea with Janumet in the past.     Metformin And Related      GI Upset    Vancomycin      Pt had red face, swelling itching of eyelids, sore throat after receiving vancmomyin and cefepime.  I think this was a histamine releasing reaction from vancomycin most likely.  The cefepime was switched to Zosyn and patient had no reaction to Zosyn    Mustard [employer] Isothiocyanate] Swelling and Rash       Current Facility-Administered Medications   Medication Dose Route Frequency Provider Last Rate Last Admin    acetaminophen (TYLENOL) tablet 650 mg  650 mg Oral Q6H PRN Jana Loja MD   650 mg at 04/14/22 0458    diclofenac sodium (VOLTAREN) 1 % gel 4 g  4 g Topical 4x Daily PRN Jana Loja MD        doxycycline hyclate (VIBRA-TABS) tablet 100 mg  100 mg Oral BID Jana Loja MD   100 mg at 04/14/22 0804    fluticasone (FLONASE) 50 MCG/ACT nasal spray 2 spray  2 spray Each Nostril Nightly Jana Loja MD   2 spray at 04/13/22 2048    furosemide (LASIX) tablet 40 mg  40 mg Oral BID AC Jana Loja MD   40 mg at 04/14/22 0266    gabapentin (NEURONTIN) capsule 400 mg  400 mg Oral TID Jana Loja MD   400 mg at 04/14/22 0803    heparin (porcine) injection 5,000 Units  5,000 Units SubCUTAneous 3 times per day Jana Loja MD   5,000 Units at 04/14/22 0625    HYDROmorphone (DILAUDID) tablet 1 mg  1 mg Oral Q6H WA Jana Loja MD   1 mg at 04/14/22 0803    glucose (GLUTOSE) 40 % oral gel 15 g  15 g Oral PRN Jana Loja MD        glucagon (rDNA) injection 1 mg  1 mg IntraMUSCular PRN Jana Loja MD        dextrose 5 % solution  100 mL/hr IntraVENous PRN Jana Loja MD        insulin glargine (LANTUS) injection vial 15 Units  15 Units SubCUTAneous Nightly Jana Loja MD   15 Units at 04/13/22 2056    insulin lispro (HUMALOG) injection vial 0-18 Units  0-18 Units SubCUTAneous TID [facility] Jana Loja MD   6 Units at 04/14/22 0624    insulin lispro (HUMALOG) injection vial 0-9 Units  0-9 Units SubCUTAneous Nightly Jana Loja MD   5 Units at 04/13/22 2053    bisacodyl (DULCOLAX) suppository 10 mg  10 mg Rectal Daily PRN Jana Ljoa MD  aluminum & magnesium hydroxide-simethicone (MAALOX) 200-200-20 MG/5ML suspension 30 mL  30 mL Oral Q6H PRN Jana Loja MD        lidocaine 4 % external patch 1 patch  1 patch TransDERmal Daily Jana Loja MD        magnesium oxide (MAG-OX) tablet 800 mg  800 mg Oral TID Jana Loja MD   800 mg at 04/14/22 0806    metoprolol tartrate (LOPRESSOR) tablet 25 mg  25 mg Oral BID Jana Loja MD   25 mg at 04/14/22 0805    OLANZapine (ZYPREXA) tablet 5 mg  5 mg Oral Nightly Jana Loja MD   5 mg at 04/13/22 2049    pantoprazole (PROTONIX) tablet 40 mg  40 mg Oral BID Jana Loja MD   40 mg at 04/14/22 0805    PARoxetine (PAXIL) tablet 20 mg  20 mg Oral Daily Jana Loja MD   20 mg at 04/14/22 0806    polyethylene glycol (GLYCOLAX) packet 17 g  17 g Oral Daily PRN Jana Loja MD        therapeutic multivitamin-minerals 1 tablet  1 tablet Oral Daily Jana Loja MD        [facility]) tablet 17.2 mg  2 tablet Oral BID Jana Loja MD   17.2 mg at 04/13/22 2049    tamsulosin (FLOMAX) capsule 0.4 mg  0.4 mg Oral Nightly Jana Loja MD   0.4 mg at 04/13/22 2048    tiZANidine (ZANAFLEX) tablet 8 mg  8 mg Oral BID Jana Loja MD   8 mg at 04/14/22 0805    dextrose bolus (hypoglycemia) 10% 125 mL  125 mL IntraVENous PRN Jana Loja MD        Or    dextrose bolus (hypoglycemia) 10% 250 mL  250 mL IntraVENous PRN Jana Loja MD             REVIEW OF SYSTEMS:   CONSTITUTIONAL: negative for fevers, chills, diaphoresis, activity change, appetite change, fatigue, night sweats and unexpected weight change. EYES: negative for blurred vision, eye discharge, visual disturbance and icterus. HEENT: negative for hearing loss, tinnitus, ear drainage, sinus pressure, nasal congestion, epistaxis and snoring. RESPIRATORY: Negative for hemoptysis, cough, sputum production.    CARDIOVASCULAR: negative for chest pain, palpitations, exertional chest pressure/discomfort, edema, syncope. GASTROINTESTINAL: negative for nausea, vomiting, diarrhea, constipation, blood in stool and abdominal pain. GENITOURINARY: negative for frequency, dysuria, urinary incontinence, decreased urine volume, and hematuria. HEMATOLOGIC/LYMPHATIC: negative for easy bruising, bleeding and lymphadenopathy. ALLERGIC/IMMUNOLOGIC: negative for recurrent infections, angioedema, anaphylaxis and drug reactions. ENDOCRINE: negative for weight changes and diabetic symptoms including polyuria, polydipsia and polyphagia. MUSCULOSKELETAL: negative for pain, joint swelling, decreased range of motion and muscle weakness. NEUROLOGICAL: negative for headaches, slurred speech, unilateral weakness. PSYCHIATRIC/BEHAVIORAL: negative for hallucinations, behavioral problems, confusion and agitation. All pertinent positives are noted in the HPI. Physical Examination:  Vitals:   Patient Vitals for the past 24 hrs:   BP Temp Temp src Pulse Resp SpO2 Height Weight   04/14/22 1020 (!) 175/91   75 16 94 %     04/14/22 0756 (!) 176/88 98 °F (36.7 °C) Oral 87 20 96 %     04/13/22 2045 (!) 169/98 98.6 °F (37 °C) Oral 91 20 94 %     04/13/22 1600 (!) 154/94 98.1 °F (36.7 °C) Oral 79 20 96 % 6' (1.829 m) 236 lb 1.8 oz (107.1 kg)     Psych: Stable mood, normal judgement, normal affect   Const: No distress  Eyes: Conjunctiva noninjected, no icterus noted; pupils equal, round, and reactive to light. HENT: Atraumatic, normocephalic; Oral mucosa moist  Neck: Trachea midline, neck supple. No thyromegaly noted. CV: Regular rate and rhythm, no murmur rub or gallop noted  Resp: Lungs clear to auscultation bilaterally, no rales wheezes or ronchi, no retractions. Respirations unlabored. GI: Soft, nontender, nondistended. Normal bowel sounds. No palpable masses. Abdominal incision healing well. Neuro: Alert, oriented, appropriate.  No cranial nerve deficits appreciated. Sensation intact to light touch. Motor examination reveals normal strength in all four limbs diffusely. No abnormalities with finger/nose or heel/shin noted. Reflexes normal and symmetric. Skin: wound vac left foot wound. MSK: No joint abnormalities noted. Ext: No significant edema appreciated. No varicosities. Lab Results   Component Value Date    WBC 5.4 04/14/2022    HGB 8.3 (L) 04/14/2022    HCT 23.9 (L) 04/14/2022    MCV 94.0 04/14/2022     04/14/2022     Lab Results   Component Value Date    INR 1.18 (H) 02/26/2022    INR 1.20 (H) 02/25/2022    INR 1.12 02/07/2022    PROTIME 13.4 (H) 02/26/2022    PROTIME 13.7 (H) 02/25/2022    PROTIME 12.7 02/07/2022     Lab Results   Component Value Date    CREATININE 1.0 04/14/2022    BUN 17 04/14/2022     04/14/2022    K 4.1 04/14/2022    CL 99 04/14/2022    CO2 27 04/14/2022     Lab Results   Component Value Date    ALT <5 (L) 02/10/2022    AST 8 (L) 02/10/2022     (H) 01/31/2022    ALKPHOS 217 (H) 02/10/2022    BILITOT 0.5 02/10/2022       IMAGING not available in chart     The above laboratory data have been reviewed. The above imaging data have been reviewed. The above medical testing have been reviewed. Body mass index is 32.02 kg/m². Barriers to Discharge: comorbidities, level of assistance, wound vacs    POST ADMISSION PHYSICIAN EVALUATION  The patient has agreed to being admitted to our comprehensive inpatient rehabilitation facility consisting of at least 180 minutes of therapy a day, 5 out of 7 days a week. The patient/family has a good understanding of our discharge process. The patient has potential to make improvement and is in need of at least two of the following multidisciplinary therapies including but not limited to physical, occupational, respiratory, and speech, nutritional services, wound care, and prosthetics and orthotics.  Given the patients complex condition and risk of further medical complications, rehabilitation services cannot be safely provided at a lower level of care such as a skilled nursing facility. I have compared the patients medical and functional status at the time of the preadmission screening and the same on this date, and there are no significant changes. By signing this document, I acknowledge that I have personally performed a full physical examination on this patient within 24 hours of admission to this inpatient rehabilitation facility and have determined the patient to be able to tolerate the above course of treatment at an intensive level for a reasonable period of time. I will be completing a detailed individualized Plan of Care for this patient by day four of the patients stay based upon the Preadmission Screen, this Post-Admission Evaluation, and the therapy evaluations. Rehabilitation Diagnosis:  Neurologic, 3.8, Neuromuscular Disorders, e.g. Critical Illness Myopathy, Other Myopathy    Assessment and Plan:  Giuseppe Deal is a 42year old male with a past medical history significant for DM2, diabetic foot ulcer with multiple amputations, HFrEF who has had a prolonged hospital course following Covid pneumonia with complications including respiratory failure, GIB, and multiple infections. He was admitted to [facility] on 4/13/22 due to functional deficits below his baseline.      Critical Illness Myopathy  - due to covid pneumonia  - PT, OT, ST    MSSA bactermia  - with left foot abscess, left hand abscess, osteomyelitis, RUL abscess  - transitioned to oral doxycycline to continue through 4/28    Multiple wounds POA  - wound vac to sacral wound and left foot wound  - wound care    HFrEF  - EF 35%  - lasix 40 mg BID  - electrolyte replacement  - daily weights, I/O    CKD  - RODRICK due to sepsis, cardiac arrest. Required dialysis during acute stay  - Cr stbale 1.6-2 range  - monitor    Atrial Fibrillation  - BB  - AC contraindicated due to bleeding risk    DM2 complicated by neuropathy  - lantus plus SSI    Diabetic Neuropathy  - gabapentin     Acute blood loss anemia  - recent GIB s/p embolization of gastroduodenal artery on 2/25 and ex-lap  - monitor and transfuse for Hb<7    History of GIB  - PPI     Urinary Retention  - flomax  - consider voiding trial soon    Chronic Pain  - dilaudid 2 mg q6 hours, wean as able    Bowels: Schedule stool softener. Follow bowel movements. Enema or suppository if needed. Bladder: Check PVR x 3. [facility] if PVR > 200ml or if any volume is > 500 ml. Sleep: Trazodone provided prn. Follow up appointments: PCP, Cardiology, wound care    Jacelyn Meckel.  Monique Hernandez MD 4/14/2022, 11:24 AM

## 2022-04-14 NOTE — CARE COORDINATION
Case Management ARU Admission Assessment     Objective:  will complete initial assessment and review the role of Case Management while on the ARU. Patient will be educated on team conferences as well as discuss family training and how it is encouraged on the unit. Order for \"discharge planning\" has been addressed. Family Present: No  Primary : Alaina Vincent (Spouse)   [phone]    Admit date: 04/13/2022            Insurance: [employer]    Admitting diagnosis: Critical illness myopathy    Current home situation: Independent prior. Lives with spouse and daughter in a 2 story home with a 1 1/2 steps to enter into home. Bedroom located in 2nd level with a flight of stairs. Bathroom on main level. Durable Medical Equipment at home:  Walker__Cane__RTS__ BSC__Shower Chair__  02__ HHN__ CPAP__  BiPap__  Hospital Bed__ W/C___ Other__________    Meds to Beds program: yes    Services prior to admission: Has had long term ABX with [facility], [address]      Preference for [facility] or Outpatient therapy: yes, [place] [facility] [place], [facility].  Patient's goal(s):Return to [facility]    Working or Volunteering prior to admission:  _x_Yes __No                        Accessibility to community resources/transportation: Spouse       Has patient experienced a recent loss or significant life event that would impact their care or ability to participate? _x_No  __Yes, please explain:    Has patient ever been treated for emotional disorder(s)? _x_No  __Yes, how does this affect current situation? Is patient and family coping appropriately with stressful events and this hospitalization?   _x_Yes  __No, please explain:    Support system at home and in the community: Family    Who will be the one to contact for family training:Juarez Osei (Spouse)   [phone]    24 hour care on discharge: TBD    PCP: Marilyn Rogers MD    Pharmacy: [place] [facility]    Discharge plan/Summary:   spoke with patient at bedside to complete initial assessment and review the role of Case Management while on the ARU. Patient educated on team conferences as well as discuss family training and how it is encouraged on the unit. Independent prior. Lives with spouse and daughter in a 2 story home with a 1 1/2 steps to enter into home. Bedroom located in 2nd level with a flight of stairs. Bathroom on main level. Patient has had long term ABX with [facility] at one point. Therapy recs pending.  Case Management (CM) will continue to follow for discharge planning recommendations from the treatment team.

## 2022-04-14 NOTE — PROGRESS NOTES
Speech Language Pathology  Facility/Department: [facility]   CLINICAL BEDSIDE SWALLOW EVALUATION    NAME: Kylah Gaston  : 1975  MRN: [id number]    ADMISSION DATE: 2022  ADMITTING DIAGNOSIS: has Hypertension; Uncontrolled type 2 diabetes mellitus with diabetic polyneuropathy ([address] Cardiomyopathy ([address] Mixed hyperlipidemia; Allergic rhinitis; Osteoarthritis; Acute osteomyelitis of left foot ([address] Acute renal failure ([address] Staph aureus infection; Third degree burn of left hand; Antibiotic-associated diarrhea; Pressure injury of deep tissue of sacral region; Severe protein-calorie malnutrition ([address] Paroxysmal atrial fibrillation ([address] Duodenal ulcer; Diabetic ulcer of left midfoot associated with type 2 diabetes mellitus, with necrosis of bone ([address] Probable abscess of upper lobe of right lung without pneumonia ([address]  Gastrointestinal hemorrhage; ABLA (acute blood loss anemia); MSSA bacteremia; and Critical illness myopathy on their problem list.  ONSET DATE: 22    Recent Chest Xray/CT of Chest:N/A    Date of Eval: 2022  Evaluating Therapist: MIGUEL Farmer    Current Diet level:  Current Diet : Regular  Current Liquid Diet : Thin      Primary Complaint  Patient Complaint: No complaints    Pain:  Pain Assessment  Pain Assessment: 0-10  Pain Level: 8  Patient's Stated Pain Goal: No pain  Pain Type: Acute pain  Pain Location: Back,Hip,Arm  Pain Orientation: Right,Left  Pain Descriptors: Aching  Pain Frequency: Continuous  Pain Onset: On-going  Clinical Progression: Gradually worsening  Non-Pharmaceutical Pain Intervention(s): Ambulation/Increased Activity,Distraction,Emotional support,Repositioned  Response to Pain Intervention: Patient Satisfied    Reason for Referral  Kylah Gaston was referred for a bedside swallow evaluation to assess the efficiency of his swallow function, identify signs and symptoms of aspiration and make recommendations regarding safe dietary consistencies, effective compensatory strategies, and safe eating environment. Impression  Dysphagia Diagnosis: Swallow function appears grossly intact  Dysphagia Outcome Severity Scale: Level 7: Normal in all situations     Pt seen sitting upright in bed, alert and oriented, cooperative and agreeable to participate. Pt on RA, own adequate dentition, WFL oral mech exam.   Pt observed with regular solids and thin liquids via cup and straw presenting with adequate mastication, A-P transit, no residue in oral cavity post swallow, timely swallow initiation, adequate laryngeal elevation upon manual palpation of the anterior neck, no overt s/s of aspiration. No coughing, wet vocal quality, or throat clearing observed. SLP recommending pt continue with IDDSI Level 7 Regular solids, thin liquids via cup or straw, meds whole with PO. No further ST services warranted for dysphagia as overall swallow function appears WNL. Pt in agreement, MD notified. Vitals/labs:   Temp: N/A  SpO2: 94% RA  RR: 16  BP: 175/91  HR: 75      Treatment Plan  Requires SLP Intervention: No  Duration/Frequency of Treatment: No ST services warranted  D/C Recommendations: To be determined (defer to PT/OT recs)       Recommended Diet and Intervention  Diet Solids Recommendation: Regular  Liquid Consistency Recommendation: Thin  Recommended Form of Meds: PO    Compensatory Swallowing Strategies  Compensatory Swallowing Strategies: Alternate solids and liquids;Eat/Feed slowly;Upright as possible for all oral intake;Remain upright for 30-45 minutes after meals;Small bites/sips    Treatment/Goals  N/A    General  Chart Reviewed: Yes  Comments: Pt with a significant history of dysphagia when hospitalized at [facility], please see EMR for further details. Subjective  Subjective: Pt alert and oriented, cooperative and agreeable to participate in evaluation. Behavior/Cognition: Alert; Cooperative;Pleasant mood  Respiratory Status: Room air  O2

## 2022-04-14 NOTE — PROGRESS NOTES
Occupational Therapy   Occupational Therapy Initial Assessment  Date: 2022   Patient Name: Leonidas Cobb  MRN: [id number]     : 1975    Date of Service: 2022    Discharge Recommendations:  Continue to assess pending progress,Home with Home health OT,24 hour supervision or assist  OT Equipment Recommendations  Equipment Needed: Yes  Mobility Devices: ADL Assistive Devices  ADL Assistive Devices: Grab Bars - shower;Transfer Tub Bench    Assessment   Performance deficits / Impairments: Decreased functional mobility ; Decreased ADL status; Decreased endurance;Decreased strength;Decreased sensation;Decreased ROM; Decreased balance;Decreased high-level IADLs;Decreased coordination;Decreased posture    Assessment: Pt is a 41 yo male presenting with a diagnosis of critical illness myopathy. Pt admitted to ARU from [facility] after prolonged hospital stay, per H&P \"secondary to COVID including intubation for acute cardiac arrest, extubated 22, GI bleed requiring multiple blood transfusion and surgery. 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 On 22 respiratory cx with Candida albicans. He had an MSSA bacteremia from his foot abscess and is s/p debridement 3/8/22 with placement of would vac. 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 After evaluation and treatment, pt found to be presenting with the above mentioned deficits. Pt is currently functioning below baseline and is min to mod A for UB ADLs, total assist for LB ADLs/functional transfers/ for functional mobility. Pt is most limited by pain Pt would benefit from continued skilled occupational therapy to address these deficits, increasing safety and independence with ADL and functional mobility. Will continue to assess for discharge needs.         Prognosis: Fair  Decision Making: High Complexity  OT Education: OT Role;Plan of Care;Precautions;Transfer Training;Energy Conservation  Patient Education: Disease Specific Education: Pt educated on importance of OOB mobility, prevention of complications of bedrest, general safety during hospitalization, and deep breathing technique for pain management. Pt verbalized understanding  Barriers to Learning: none  REQUIRES OT FOLLOW UP: Yes  Activity Tolerance  Activity Tolerance:  Patient limited by pain;Treatment limited secondary to medical complications (free text)  Activity Tolerance: Vitals: /88, HR 87, 02 96% on RA. Pt reporting 9/10 pain during session limiting ability to peform OOB activity. Attempted bed to w/c transfer with adrian however pt reported needing to have a BM resulting in return to bed for use of bedpain. Pt declined EOB activities d/t wound on sacrum. Safety Devices  Safety Devices in place: Yes  Type of devices: Left in bed;Nurse notified; Bed alarm in place           Patient Diagnosis(es): There were no encounter diagnoses.      has a past medical history of Abscess of left foot, Abscess of left hand, Acute encephalopathy, Acute hypoxemic respiratory failure ([address] Acute osteomyelitis of left hand ([address] Acute osteomyelitis of right hallux ([address] Cardiopulmonary arrest ([address] Cellulitis of left foot, CHF (congestive heart failure) ([address] Chronic osteomyelitis of left foot ([address] Closed displaced fracture of distal phalanx of right little finger, Clostridium difficile infection, Diabetic ulcer of left forefoot associated with type 2 diabetes mellitus, with necrosis of bone ([address] Diabetic ulcer of right great toe associated with type 2 diabetes mellitus, with necrosis of bone ([address] Enterococcal infection, Failed soft 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 toe amputation site, Hemodialysis patient [facility]), History of blood transfusion, History of hyperbaric oxygen therapy, Hyperlipidemia, Hypertension, Infective tenosynovitis of extensor tendons of left hand, Migraine, MSSA bacteremia, Possible perforated tympanic membrane, Post-op hematoma of left foot, Recurrent otitis media, Septic shock (HCC), Staphylococcal arthritis of left foot (HCC), Syncope, Tear of medial meniscus of left knee, Tobacco use, Toe osteomyelitis, left ([facility] and VAP (ventilator-associated pneumonia) ([facility] has a past surgical history that includes Tonsillectomy; [place] tooth extraction; Knee arthroscopy (Left, 08/15/2013); Toe amputation (Right, 08/23/2019); other surgical history (Left, 07/24/2020); Toe amputation (Left, 07/24/2020); Toe amputation (Left, 10/22/2020); Foot Debridement (Left, 02/01/2022); Arm Surgery (Left, 02/05/2022); IR NONTUNNELED VASCULAR CATHETER > 5 YEARS (02/11/2022); Upper gastrointestinal endoscopy (N/A, 02/17/2022); IR EMBOLIZATION HEMORRHAGE (Right, 02/25/2022); Upper gastrointestinal endoscopy (N/A, 2/27/2022); laparotomy (N/A, 2/27/2022); Foot Debridement (Left, 3/8/2022); Cholecystectomy; Dilatation, esophagus; and Endoscopy, colon, diagnostic. Restrictions  Restrictions/Precautions  Restrictions/Precautions: General Precautions,Fall Risk  Position Activity Restriction  Other position/activity restrictions: RUE PICC, wound vacs to L foot and sacrum, rea    Subjective   General  Chart Reviewed: Yes,Orders,Progress Notes,History and Physical,Previous Admission,Imaging,Operative Notes  Patient assessed for rehabilitation services?: Yes  Family / Caregiver Present: No  Referring Practitioner: Darion Sawyer MD  Subjective  Subjective: Pt presented supine in bed and reporting 9/10 bed. RN notified to administer pain medications. Pt agreeable to OT therapy evaluation. General Comment  Comments: RN cleared patient for therapy  Patient Currently in Pain: Yes  Pain Assessment  Pain Assessment: 0-10  Pain Level: 9  Pain Type: Acute pain  Pain Location: Back; Sacrum  Pain Orientation: Right;Left;Posterior  Pain Descriptors: Aching  Pain Frequency: Continuous  Pain Onset: On-going  Non-Pharmaceutical Pain Intervention(s): Ambulation/Increased Activity; Distraction; Emotional support;Repositioned  Response to Pain Intervention: Patient Satisfied  Pre Treatment Pain Screening  Intervention List: Nurse called to administer meds; Patient able to continue with treatment  Vital Signs  Patient Currently in Pain: Yes  Social/Functional History  Social/Functional History  Lives With: Spouse  Type of Home: House  Home Layout: Two level,Bed/Bath upstairs,Able to Live on Main level with bedroom/bathroom (Pt able to live on 1st floor living room, full bath on first floor)  Home Access: Stairs to enter with rails  Entrance Stairs - Number of Steps: 1-2 DANIE  Entrance Stairs - Rails: Both  Bathroom Shower/Tub: Tub/Shower unit,Curtain  Bathroom Toilet: Standard  Bathroom Equipment: Hand-held shower  Bathroom Accessibility: Walker accessible  ADL Assistance: Independent  Homemaking Assistance: Independent  Homemaking Responsibilities: Yes  Meal Prep Responsibility: Primary  Laundry Responsibility: Secondary  Cleaning Responsibility: Secondary  Bill Paying/Finance Responsibility: Primary  Shopping Responsibility: Secondary  Dependent Care Responsibility: Primary  Health Care Management: Primary  Ambulation Assistance: Independent  Transfer Assistance: Independent  Active : Yes  Mode of Transportation: Car,Truck  Occupation: Full time employment  Type of occupation: Charmaine Francis, [place] , fabrication shop owner, 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 No fall history in past year       Objective        Orientation  Overall Orientation Status: Within Functional Limits  Observation/Palpation  Posture: Fair  Balance  Sitting Balance:  Moderate assistance  Standing Balance: Unable to assess(comment) (DEE d/t safety concerns and pain)  ADL  Feeding: Independent  Grooming: Minimal assistance  UE Bathing: Contact guard assistance (Min assist provided for thoroughness of BUEs)  LE Bathing: Dependent/Total  UE Dressing: Unable to assess(comment) (Pt declining UB clothing at this time d/t pain)  LE Dressing: Dependent/Total (dof depends, don pants)  Toileting: Dependent/Total (rea, bedpan)  Additional Comments: Pt completed ADLs at bed level this date d/t reports of 9/10 pain. Tone RUE  RUE Tone: Normotonic  Tone LUE  LUE Tone: Normotonic  Coordination  Movements Are Fluid And Coordinated: Yes        Transfers  Transfer Comments: Pt requires adrian lift for transfers     Cognition  Overall Cognitive Status: WFL  Perception  Overall Perceptual Status: WFL     Sensation  Overall Sensation Status: Impaired (Pt with baseline neuropathy-- absent sensation to B feet and lower legs; pt reports \"the numbness goes almost to my knees\")  Light Touch: Severe deficits in the RLE; Severe deficits in the LLE  Type of ROM/Therapeutic Exercise  Type of ROM/Therapeutic Exercise: AROM  Comment: Pt completed exercises while deep breathing for pain management with good results.   Exercises  Shoulder Depression: x10  Shoulder Elevation: x10  Finger Flexion: x10  Finger Extension: x10     LUE AROM (degrees)  LUE AROM : WFL (Not formally assessed however WFL for bed level ADLs)  Left Hand AROM (degrees)  Left Hand AROM: WFL  RUE AROM (degrees)  RUE AROM : WFL (Not formally assessed however WFL for bed level ADLs)  Right Hand AROM (degrees)  Right Hand AROM: WFL  LUE Strength  Gross LUE Strength: Exceptions to [facility]  L Shoulder Flex: 3+/5  L Shoulder Ext: 3/5  L Hand General: 4-/5  RUE Strength  Gross RUE Strength: Exceptions to [facility]  R Shoulder Flex: 3+/5  R Shoulder Ext: 3/5  R Hand General: 4-/5                   Plan   Plan  Times per week: 5-7x per week  Current Treatment Recommendations: Strengthening,ROM,Endurance Training,Balance Training,Wheelchair Mobility Training,Gait Omari Boyer training,Pain Management,Patient/Caregiver Education & Training,Self-Care / ADL,Positioning,Functional Mobility Training,Home Management Training      Goals  Short term goals  Time Frame for Short term goals: Pt will complete UB dressing with min A  Short term goal 1: Pt will complete LB dressing with mod A  Short term goal 2: Pt will complete LB bathing with mod A  Short term goal 3: Pt will complete UB bathing with SBA  Short term goal 4: Pt will complete 3 grooming task at w/c level  Short term goal 5: Pt will complete x20 reps of BUE HEP to improve UB strength/endurance for repositioning and UB ADLs  Long term goals  Time Frame for Long term goals : 3 weeks (4/05)  Long term goal 1: Pt will complete total body dressing with supv  Long term goal 2: Pt will complete total body bathing with supv  Long term goal 3: Pt will complete 3 grooming tasks at sink with mod I  Long term goal 4: Pt will perform functional transfers with mod I  Patient Goals   Patient goals : 1 week (4/21)       Therapy Time   Individual Concurrent Group Co-treatment   Time In 0730         Time Out 0900         Minutes 90         Timed Code Treatment Minutes: [address] [facility],

## 2022-04-14 NOTE — PROGRESS NOTES
Speech Language Pathology  Facility/Department: [facility]  Initial Speech/Language/Cognitive Assessment    NAME: Brayan Dale  : 1975   MRN: [id number]  ADMISSION DATE: 2022  ADMITTING DIAGNOSIS: has Hypertension; Uncontrolled type 2 diabetes mellitus with diabetic polyneuropathy ([address] Cardiomyopathy ([address] Mixed hyperlipidemia; Allergic rhinitis; Osteoarthritis; Acute osteomyelitis of left foot ([address] Acute renal failure ([address] Staph aureus infection; Third degree burn of left hand; Antibiotic-associated diarrhea; Pressure injury of deep tissue of sacral region; Severe protein-calorie malnutrition ([address] Paroxysmal atrial fibrillation ([address] Duodenal ulcer; Diabetic ulcer of left midfoot associated with type 2 diabetes mellitus, with necrosis of bone ([address] Probable abscess of upper lobe of right lung without pneumonia ([address] Gastrointestinal hemorrhage; ABLA (acute blood loss anemia); MSSA bacteremia; and Critical illness myopathy on their problem list.  DATE ONSET: 22    Date of Eval: 2022   Evaluating Therapist: MIGUEL Leal    RECENT RESULTS  CT OF HEAD/MRI:N/A    Primary Complaint:  No complaints from pt, denies any cognitive changes    Pt's goals: \"to walk\"    Pain:  Pain Assessment  Pain Assessment: 0-10  Pain Level: 8  Patient's Stated Pain Goal: No pain  Pain Type: Acute pain  Pain Location: Back,Hip,Arm  Pain Orientation: Right,Left  Pain Descriptors: Aching  Pain Frequency: Continuous  Pain Onset: On-going  Clinical Progression: Gradually worsening  Non-Pharmaceutical Pain Intervention(s): Ambulation/Increased Activity,Distraction,Emotional support,Repositioned  Response to Pain Intervention: Patient Satisfied    Assessment:  Cognitive Diagnosis: WFL   Per H&P: \"per H&P \"secondary to COVID including intubation for acute cardiac arrest, extubated 22, GI bleed requiring multiple blood transfusion and surgery.  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 On 2/13/22 respiratory cx with Candida albicans. He had an MSSA bacteremia from his foot abscess and is s/p debridement 3/8/22 with placement of would vac. Now with unstageable decubitus ulcer coccyx. \"     Pt alert and oriented, cooperative and agreeable to participate in evaluation. Pt in significant pain throughout evaluation, but reported he already received pain medications prior to tx session. Pt reported he lives at home with his wife, both pt and wife work full time, pt manages his own meds, both shares the finances, cooking, cleaning, laundry, wife does the grocery shopping, and pt manages yardwork. Pt works full time between multiple jobs, enjoys fishing and hunting. Pt was administered the [facility] REHABILITATION scoring a 28/30. A score of 26/30 is considered WFL. No further ST services are warranted at this time as [facility] and cognitive skills are at baseline. RN notified, pt in agreement. Tate Cognitive Assessment (MoCA)    Section Subtest Score Comments   Visuospatial/ [place] making 1/1     Copy Cube 1/1     Clock 2/3 Pt put numbers on outside of [place] vs inside (able to recognize mistake)   Naming Picture naming 3/3    Attention Number repetition 2/2     Selective attention 1/1     Serial 7s 3/3    Language Repetition 2/2     Fluency 0/1 Pt able to name 8 items, per protocol WFL=11 items or more   Abstraction Comparisons 2/2    Delayed Recall Recall 5 novel words 5/5    Orientation Spatial and Temporal 6/6     Total:  28/30     Recommendations:  Requires SLP Intervention: No  Duration/Frequency of Treatment: No ST services warranted  D/C Recommendations:  To be determined (defer to PT/OT recs)       Plan:  N/A  Goals: N/A  Patient/family involved in developing goals and treatment plan: yes, pt     Subjective:  General  Chart Reviewed: Yes  Patient assessed for rehabilitation services?: Yes  Family / Caregiver Present: No  Subjective  Subjective: Pt alert and oriented, cooperative and agreeable to participate in evaluation. 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, [place] , fabrication shop owner, partime work at [employer] and [employer]: fishing and hunting  Vision  Vision: Impaired  Vision Exceptions:  (very blurred vision in right eye)  Hearing  Hearing: Within functional limits    Objective:  Oral/Motor  Oral Motor: Within functional limits    Auditory Comprehension  Comprehension: Within Functional Limits    Expression  Primary Mode of Expression: Verbal    Verbal Expression  Verbal Expression: Within functional limits    Written Expression  Dominant Hand: Right  Written Expression: Within Functional Limits    Motor Speech  Motor Speech:  Within Functional Limits    Pragmatics/Social Functioning  Pragmatics: Within functional limits    Cognition:   Orientation  Overall Orientation Status: Within Normal Limits  Attention  Attention: Within Functional Limits  Memory  Memory: Within Funtional Limits  Problem Solving  Problem Solving: Within Functional Limits  Numeric Reasoning  Numeric Reasoning: Within Functional Limits  Abstract Reasoning  Abstract Reasoning: Within Functional Limits  Safety/Judgement  Safety/Judgement: Within Functional Limits    Prognosis:  Speech Therapy Prognosis  Prognosis: Good  Prognosis Considerations: Age;Participation Level; Potential  Individuals consulted  Consulted and agree with results and recommendations: Patient;RN    Education:  Patient Education Response: Verbalizes understanding  Safety Devices in place: Yes  Type of devices: All fall risk precautions in place; Left in bed;Bed alarm in place;Call light within reach;Nurse notified    Therapy Time:   Individual   Time In 0930   Time Out 1015   Minutes [address] A CCC-SLP  Speech-Language Pathologist  [phone]

## 2022-04-15 NOTE — PROGRESS NOTES
Everardo Oliva  4/15/2022  [id number]    Chief Complaint: Critical illness myopathy    Subjective:   No acute events overnight. Today Kaur Larkin is seen in his room. He reports nausea, vomiting, and pain. Added PRN compazine, PRN oxycodone, and scheduled reglan. He denies other acute complaints at this time. ROS: denies f/c, sob, cp  Objective:  Patient Vitals for the past 24 hrs:   BP Temp Temp src Pulse Resp SpO2   04/15/22 0911 (!) 148/73 98.8 °F (37.1 °C) Oral 98 18 97 %   04/14/22 1935 (!) 177/92 98.4 °F (36.9 °C) Oral 104 20 93 %     Gen: No distress, pleasant. HEENT: Normocephalic, atraumatic. CV: Regular rate and rhythm. Resp: No respiratory distress. Abd: Soft, nontender  Ext: No edema. Neuro: Alert, oriented, appropriately interactive. Wt Readings from Last 3 Encounters:   04/13/22 236 lb 1.8 oz (107.1 kg)   03/10/22 255 lb 1.6 oz (115.7 kg)   10/14/21 282 lb (127.9 kg)       Laboratory data:   Lab Results   Component Value Date    WBC 5.4 04/14/2022    HGB 8.3 (L) 04/14/2022    HCT 23.9 (L) 04/14/2022    MCV 94.0 04/14/2022     04/14/2022       Lab Results   Component Value Date     04/14/2022    K 4.1 04/14/2022    CL 99 04/14/2022    CO2 27 04/14/2022    BUN 17 04/14/2022    CREATININE 1.0 04/14/2022    GLUCOSE 193 04/14/2022    CALCIUM 8.7 04/14/2022        Therapy progress:  PT  Position Activity Restriction  Other position/activity restrictions: RUE PICC, wound vacs to L foot and sacrum, rea  Objective     Sit to Stand: Unable to assess  Stand to sit: Unable to assess  Bed to Chair: Unable to assess     OT  PT Equipment Recommendations  Other: TBD pending progress and increasead functional mobility assessment     Assessment        SLP  Current Diet : Regular  Current Liquid Diet : Thin  Diet Solids Recommendation: Regular  Liquid Consistency Recommendation: Thin    Body mass index is 32.02 kg/m².     Assessment and Plan:  Xu Tracy is a 42year old male with a past

## 2022-04-15 NOTE — PROGRESS NOTES
Occupational Therapy  Facility/Department: [facility]  Daily Treatment Note  NAME: Nolberto Galan  : 1975  MRN: [id number]    Date of Service: 4/15/2022    Discharge Recommendations:  Continue to assess pending progress,Patient would benefit from continued therapy after discharge  OT Equipment Recommendations  Other: CTA pending progress    Assessment   Performance deficits / Impairments: Decreased functional mobility ; Decreased ADL status; Decreased endurance;Decreased strength;Decreased sensation;Decreased ROM; Decreased balance;Decreased high-level IADLs;Decreased coordination;Decreased posture  Assessment: First session: Pt toleated simple grooming and UB ADLs in bed with SBA. Pt completed x7-10 reps of BUE exercises in order to increase functional strength for ADLs/transfers. Pt contiues to be limited by generalized weakness and pain from prolonged hospital stay. Anticipate pt would benefit from ongoing OT in order to increase functional status prior to returning home. Second session: Co-tx completed this date to increase safety with OOB mobility. Pt engaged it x5 STS in stedy with max Ax2 and verbal/tactile cues to bring hips forwards. Pt instructed to count during STS to track breathing with good results. Pt able to stand x2 for 10 seconds before reports of weakness. Pt max A for LB dressing. Cont POC. Third session: Pt presented sitting up in chair and agreeable to therapy session with family present. Engaged pt in therapeutic exercises using heavy bean bags and target while seated in chair. During exercise pt reporting pain in R shoulder during shoulder flexion. Provided PROM with noted scapular rhythm deficits for upward rotation and retraction d/t tightness in rhomboid. Provided scapular mobilization with patient reporting no pain during A/PROM. Pt requesting to return to bed at end of session requiring max Ax2 assist for STS using stedy. Cont POC.    Treatment Diagnosis: functional declined in ADLs due to critical illness myopathy  Prognosis: Fair  OT Education: OT Role;Plan of Care;Precautions;Transfer Training;Energy Conservation; ADL Adaptive Strategies; Home Exercise Program  Patient Education: Pt provided with HEP to complete outside of therapy- would benefit from ongoing education; not ind learner with HEP at this time  REQUIRES OT FOLLOW UP: Yes  Activity Tolerance  Activity Tolerance: Patient limited by pain; Patient Tolerated treatment well;Treatment limited secondary to medical complications (free text)  Activity Tolerance: Pt reports multiple emesis prior to OT arrival  Safety Devices  Safety Devices in place: Yes  Type of devices: Left in bed;Nurse notified; Bed alarm in place; All fall risk precautions in place         Patient Diagnosis(es): There were no encounter diagnoses.       has a past medical history of Abscess of left foot, Abscess of left hand, Acute encephalopathy, Acute hypoxemic respiratory failure ([address] Acute osteomyelitis of left hand ([address] Acute osteomyelitis of right hallux ([address] Cardiopulmonary arrest ([address] Cellulitis of left foot, CHF (congestive heart failure) ([address] Chronic osteomyelitis of left foot ([address] Closed displaced fracture of distal phalanx of right little finger, Clostridium difficile infection, Diabetic ulcer of left forefoot associated with type 2 diabetes mellitus, with necrosis of bone ([address] Diabetic ulcer of right great toe associated with type 2 diabetes mellitus, with necrosis of bone ([address] Enterococcal infection, Failed soft 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 toe amputation site, Hemodialysis patient ([address] History of blood transfusion, History of hyperbaric oxygen therapy, Hyperlipidemia, Hypertension, Infective tenosynovitis of extensor tendons of left hand, Migraine, MSSA bacteremia, Possible perforated tympanic membrane, Post-op hematoma of left foot, Recurrent otitis media, Septic shock ([address] Staphylococcal arthritis of left foot ([address] Syncope, Tear of medial meniscus of left knee, Tobacco use, Toe osteomyelitis, left ([facility] [address] and VAP (ventilator-associated pneumonia) ([facility] [address] has a past surgical history that includes Tonsillectomy; [place] tooth extraction; Knee arthroscopy (Left, 08/15/2013); Toe amputation (Right, 08/23/2019); other surgical history (Left, 07/24/2020); Toe amputation (Left, 07/24/2020); Toe amputation (Left, 10/22/2020); Foot Debridement (Left, 02/01/2022); Arm Surgery (Left, 02/05/2022); IR NONTUNNELED VASCULAR CATHETER > 5 YEARS (02/11/2022); Upper gastrointestinal endoscopy (N/A, 02/17/2022); IR EMBOLIZATION HEMORRHAGE (Right, 02/25/2022); Upper gastrointestinal endoscopy (N/A, 2/27/2022); laparotomy (N/A, 2/27/2022); Foot Debridement (Left, 3/8/2022); Cholecystectomy; Dilatation, esophagus; and Endoscopy, colon, diagnostic. Restrictions  Restrictions/Precautions  Restrictions/Precautions: General Precautions,Fall Risk  Position Activity Restriction  Other position/activity restrictions: RUE PICC, wound vacs to L foot and sacrum, rea    Subjective   General  Chart Reviewed: Yes,Orders,Progress Notes,History and Physical,Previous Admission,Imaging,Operative Notes  Patient assessed for rehabilitation services?: Yes  Family / Caregiver Present: No  Referring Practitioner: Claudette Rich MD  Subjective  Subjective: Pt resting in bed. Reports he has been vomiting and having multiple BMs.  Pleasant and agreeable to OT tx session  General Comment  Comments: RN cleared patient for therapy      Orientation  Orientation  Overall Orientation Status: Within Functional Limits  Objective    ADL  Grooming: Increased time to complete;Stand by assistance (pt completed oral care; semi-upright in bed; washing face while seated upright in bed)           Bed mobility  Scooting: Dependent/Total;Maximal assistance (scooting up in bed for upright sittng)        Cognition  Overall Cognitive Status: WFL      Type of ROM/Therapeutic Exercise  Type of ROM/Therapeutic Exercise: AROM  Comment: semi-upright in bed  Exercises  Shoulder Elevation: x10  Elbow Flexion: x10 with 2lb weight- BUEs  Elbow Extension: x10 with 2lb weight- BUEs  Supination: x10 with 2lb weight- BUEs  Pronation: x10 with 2lb weight- BUEs  Wrist Flexion: x10 with 2lb weight- BUEs  Wrist Extension: x10 with 2lb weight- BUEs  Other: x30 sec arm circles         Plan   Plan  Times per week: 5-7x per week  Current Treatment Recommendations: Strengthening,ROM,Endurance Training,Balance Training,Stair training,Pain Management,Patient/Caregiver Education & Training,Self-Care / ADL,Positioning,Functional Mobility Training,Home Management Training,Safety Education & Training    Goals  Short term goals  Time Frame for Short term goals: Pt will complete UB dressing with min A  Short term goal 1: Pt will complete LB dressing with mod A  Short term goal 2: Pt will complete LB bathing with mod A  Short term goal 3: Pt will complete UB bathing with SBA  Short term goal 4: Pt will complete 3 grooming task at w/c level  Short term goal 5: Pt will complete x20 reps of BUE HEP to improve UB strength/endurance for repositioning and UB ADLs  Long term goals  Time Frame for Long term goals : 3 weeks (4/05)  Long term goal 1: Pt will complete total body dressing with supv  Long term goal 2: Pt will complete total body bathing with supv  Long term goal 3: Pt will complete 3 grooming tasks at sink with mod I  Long term goal 4: Pt will perform functional transfers with mod I  Patient Goals   Patient goals : 1 week (4/21)       Therapy Time   Individual Concurrent Group Co-treatment   Time In 0930         Time Out 1009         Minutes 39         Timed Code Treatment Minutes: 39 Minutes      Second Session Therapy Time:   Individual Concurrent Group Co-treatment   Time In      1100   Time Out      1130   Minutes      30   Timed Code Treatment Minutes:  30 Minutes    Second Session Therapy Time:   Individual Concurrent Group Co-treatment Time In 1300        Time Out 1330        Minutes 30          Timed Code Treatment Minutes:  30 Minutes    Total Treatment Minutes:  96 minutes        Branda Severs, OTR/L   Second/Third session: Annmarie Bhat OTR/L

## 2022-04-15 NOTE — PROGRESS NOTES
Physical Therapy  Facility/Department: [facility]  Daily Treatment Note  NAME: Jono Stephens  : 1975  MRN: [id number]    Date of Service: 4/15/2022    Discharge Recommendations:  Continue to assess pending progress,24 hour supervision or assist,Home with Home health PT   PT Equipment Recommendations  Other: TBD pending progress and increasead functional mobility assessment    Assessment    Body structures, Functions, Activity limitations: Decreased functional mobility ; Decreased strength;Decreased endurance;Decreased balance;Decreased sensation; Increased pain  Assessment: Patient seen for sitting balance, bed mobility, and LE strengthening. Pt completed rolling L and R with CGA and bedrails to don briefs and pants in bed. Patient completed supine>sit with mod A and sat EOB 15 mins with supervision. Pt assisted with donning socks. Pt completed seated exercises through partial ROM. This session immediately leading into co-treat session. Treatment Diagnosis: impaired strength and activity tolerance  Prognosis: Good  Decision Making: High Complexity  PT Education: Goals;PT Role;Plan of Care;Transfer Training;General Safety; Functional Mobility Training;Disease Specific Education;Home Exercise Program;Precautions  Patient Education: pt educated on HEP, importance of OOB mobility, and benefits of progressing transfers and standing endurance - demos understanding  REQUIRES PT FOLLOW UP: Yes  Activity Tolerance  Activity Tolerance: Patient Tolerated treatment well;Patient limited by pain  Activity Tolerance: 96% on RA, 87 bpm, 147/87     Patient Diagnosis(es): There were no encounter diagnoses.      has a past medical history of Abscess of left foot, Abscess of left hand, Acute encephalopathy, Acute hypoxemic respiratory failure ([facility] Acute osteomyelitis of left hand ([facility] Acute osteomyelitis of right hallux [facility]), Cardiopulmonary arrest ([facility] Cellulitis of left foot, CHF (congestive heart failure) ([facility] Chronic osteomyelitis of left foot ([address] Closed displaced fracture of distal phalanx of right little finger, Clostridium difficile infection, Diabetic ulcer of left forefoot associated with type 2 diabetes mellitus, with necrosis of bone ([address] Diabetic ulcer of right great toe associated with type 2 diabetes mellitus, with necrosis of bone ([address] Enterococcal infection, 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 has a past surgical history that includes Tonsillectomy; [place] tooth extraction; Knee arthroscopy (Left, 08/15/2013); Toe amputation (Right, 08/23/2019); other surgical history (Left, 07/24/2020); Toe amputation (Left, 07/24/2020); Toe amputation (Left, 10/22/2020); Foot Debridement (Left, 02/01/2022); Arm Surgery (Left, 02/05/2022); IR NONTUNNELED VASCULAR CATHETER > 5 YEARS (02/11/2022); Upper gastrointestinal endoscopy (N/A, 02/17/2022); IR EMBOLIZATION HEMORRHAGE (Right, 02/25/2022); Upper gastrointestinal endoscopy (N/A, 2/27/2022); laparotomy (N/A, 2/27/2022); Foot Debridement (Left, 3/8/2022); Cholecystectomy; Dilatation, esophagus; and Endoscopy, colon, diagnostic. Restrictions  Restrictions/Precautions  Restrictions/Precautions: General Precautions,Fall Risk  Position Activity Restriction  Other position/activity restrictions: RUE PICC, wound vacs to L foot and sacrum, rea  Subjective   General  Chart Reviewed:  Yes  Additional Pertinent Hx: PMHx: CHF, HTN, B hallux amputations (2019 and 2020). Per H&P: \"Pt with prolonged hospital stay secondary to COVID including intubation for acute cardiac arrest, extubated 2/9/22, GI bleed requiring multiple blood transfusion and surgery. 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 On 2/13/22 respiratory cx with Candida albicans He had an MSSA bacteremia from his foot abscess and is s/p debridement 3/8/22 with placement of would vac. \"  Response To Previous Treatment: Patient with no complaints from previous session. Family / Caregiver Present: Yes  Referring Practitioner: Georgiana Chavira MD  Subjective  Subjective: pt in bed, agreeable to therapy  Pain Screening  Patient Currently in Pain: Yes  Pain Assessment  Pain Assessment: 0-10  Pain Level: 7  Pain Type: Acute pain  Pain Location: Back;Buttocks  Pain Descriptors: Aching  Non-Pharmaceutical Pain Intervention(s): Ambulation/Increased Activity;Repositioned  Vital Signs  Patient Currently in Pain: Yes       Orientation  Orientation  Overall Orientation Status: Within Functional Limits     Objective   Bed mobility  Rolling to Left: Contact guard assistance (with bedrails and HOB elevated)  Rolling to Right: Contact guard assistance (with bedrail and HOB elevated)  Supine to Sit: Moderate assistance (assist for trunk, HOB elevated, bedrail use)  Sit to Supine: Unable to assess  Scooting: Maximal assistance (to shift hips toward L to roll)  Comment: Pt sat EOB x15 mins while completing seated exercises and dressing with supervision.   Transfers  Sit to Stand: Unable to assess  Stand to sit: Unable to assess  Bed to Chair: Unable to assess  Ambulation  Ambulation?: No        Exercises  Hip Flexion: 1x 10 BLE through minimal ROM  Knee Long Arc Quad: 1x 10 BLE through partial ROM  Other exercises  Other exercises?: Yes  Other exercises 1: 1x 10 dax 5 sec adduction holds against pillow                     Addendum Second Session  Assessment: Pt seen as co-treat with OT to facilitate safe transfers training d/t pt level of complexity and assist level given deconditioning. Pt limited d/t weakness and endurance. Pt up in recliner at end of session with needs met. Transfers: Pt completed 1x attempt to stand from bed with herb stedy with max A x2 and unable to clear hips. Pt then completed additional stand from bed with herb stedy and max A x2 with pt able to clear hips with cues for hip extension and bed height elevated. Pt then completed 2x 10 sec stands in herb stedy following STS from paddles with max A x2. Pt requires cues to breathe during standing. Pt completes 1 additional stand from paddles prior to transferring to recliner. Pt transferred to recliner dependently in [address] [facility] [address]. Pt positioned with maxi sling and air cushion under him in recliner. Exercises:  -1x 10 BLE AAROM heel slides with therapist supporting under heel      Addendum Third Session  Assessment: Pt seen for LE strengthening in sitting position. Pt in recliner from previous session. Pt provided with written HEP for seated and supine exercises to strengthening BLE. Pt demos and verbalizes understanding of HEP.  Notified  of request to increase pt to 60 min co-treat session moving forward given level of assist.     Exercises:  -10 reps of chair pushups with minimal clearance from chair  -1x 15 BLE AAROM LAQ   -1x 15 seated clams against blue theraband  -1x 15, 1x 20 BLE seated hamstring curls against blue theraband  -1x 15 dax glute sets    Goals  Short term goals  Time Frame for Short term goals: 10 days- 4/22/22  Short term goal 1: Pt will complete bed mobility with min A  Short term goal 2: Pt will complete functional transfers with max A x 1 using LRAD  Short term goal 3: Pt will propel manual w/c 50ft with min A  Short term goal 4: Pt will tolerate gait assessment and appropriate LTG to be set  Long term goals  Time Frame for Long term goals : 3 weeks- 5/4/22  Long term goal 1: Pt will complete bed mobility with supervision  Long term goal 2: Pt will complete functional transfers with min A using LRAD  Long term goal 3: Pt will propel manual w/c 150ft with mod I  Patient Goals   Patient goals : \"to walk again\"    Plan    Plan  Times per week: 5 out of 7 days/week  Plan weeks: 3 weeks  Current Treatment Recommendations: Strengthening,Balance Training,Functional Mobility Training,Transfer Training,Gait Training,Stair training,Wheelchair Mobility  Exercise Program,Safety Education & Training,Patient/Caregiver Education & Training,Equipment Evaluation, Education, & procurement  Safety Devices  Type of devices:  All fall risk precautions in place,Call light within reach,Nurse notified,Patient at risk for falls,Left in chair,Chair alarm in place,Gait belt  Restraints  Initially in place: No     Therapy Time   Individual Concurrent Group Co-treatment   Time In 1030     1100   Time Out 1100     1130   Minutes 30     30   Timed Code Treatment Minutes: 60 Minutes     Second Session Therapy Time:   Individual Concurrent Group Co-treatment   Time In 1230        Time Out 1300        Minutes 30          Timed Code Treatment Minutes:  90 mins    Chandni Alcocer PT

## 2022-04-16 NOTE — PROGRESS NOTES
This RN was helping clean patient after he stated he \"might have had a bowel movement, but it could have been just gas\". Upon inspection of sacral wound dressing, this RN noted that the dressing was peeling off and was soiled. Wound vac suction was not compromised. This RN ( with another RN assistance) helped remove old, soiled foams from sacral wound, placed new pieces of foam, and reapplied dressing. Wound vac was restarted and suction was reestablished. New dressing was clean, dry, and intact at the time of this writing.

## 2022-04-16 NOTE — PROGRESS NOTES
Occupational Therapy  Facility/Department: [facility]  Daily Treatment Note  NAME: Justin López  : 1975  MRN: [id number]    Date of Service: 2022    Discharge Recommendations:  Continue to assess pending progress,Patient would benefit from continued therapy after discharge  OT Equipment Recommendations  Equipment Needed: Yes  Mobility Devices: ADL Assistive Devices  ADL Assistive Devices: Grab Bars - shower;Transfer Tub Bench  Other: CTA pending progress    Assessment   Performance deficits / Impairments: Decreased functional mobility ; Decreased ADL status; Decreased endurance;Decreased strength;Decreased sensation;Decreased ROM; Decreased balance;Decreased high-level IADLs;Decreased coordination;Decreased posture  Assessment: Pt tolerated therapy session well. Pt progressing with activity tolerance and standing balance. Pt completing functional transfers x5 reps in parallel bars today with Max A x2 people and bilateral knee block. Pt able to tolerate up to 15 seconds of consective standing in parallel bars with max A x2. Pt requiring facilitation and cues for upright posture, BLE placement, weightshifting while in stance in parallel bars. Pt tolerated BUE ROM and strengthening exercises. Pt reports recent partial tear injury  to L shoulder. Pt engages in A/AROM exercises L shoulder. Pt uses free weights for strengthening exercises RUE. Pt remains far from baseline level of occupational performance. Recommend continued OT services to increase safety and independence with ADLs and functional transfers. Prognosis: Fair  OT Education: OT Role;Plan of Care;Precautions;Transfer Training;Energy Conservation; ADL Adaptive Strategies; Home Exercise Program  Patient Education: bed mobility, sitting/standing balance, safety with transfers using José Antonio Pea and in parallel bars, upright posture during standing, w/c mobility, RUE strengthening exercises, LUE ROM exercises.   Barriers to Learning: none  REQUIRES OT FOLLOW UP: Yes  Activity Tolerance  Activity Tolerance: Patient Tolerated treatment well;Patient limited by fatigue;Patient limited by pain  Activity Tolerance: Vitals supine: 103/55, 87bpm, 94% SpO2 on RA. Safety Devices  Safety Devices in place: Yes  Type of devices: Nurse notified; Chair alarm in place; Left in chair;Call light within reach;Gait belt;Patient at risk for falls         Patient Diagnosis(es): There were no encounter diagnoses. has a past medical history of Abscess of left foot, Abscess of left hand, Acute encephalopathy, Acute hypoxemic respiratory failure ([address] Acute osteomyelitis of left hand ([address] Acute osteomyelitis of right hallux ([address] Cardiopulmonary arrest ([address] Cellulitis of left foot, CHF (congestive heart failure) ([address] Chronic osteomyelitis of left foot ([address] Closed displaced fracture of distal phalanx of right little finger, Clostridium difficile infection, Diabetic ulcer of left forefoot associated with type 2 diabetes mellitus, with necrosis of bone ([address] Diabetic ulcer of right great toe associated with type 2 diabetes mellitus, with necrosis of bone ([address] Enterococcal infection, 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 has a past surgical history that includes Tonsillectomy; [place] tooth extraction; Knee arthroscopy (Left, 08/15/2013); Toe amputation (Right, 08/23/2019); other surgical history (Left, 07/24/2020); Toe amputation (Left, 07/24/2020);  Toe amputation (Left, 10/22/2020); Foot Debridement (Left, 02/01/2022); Arm Surgery (Left, 02/05/2022); IR NONTUNNELED VASCULAR CATHETER > 5 YEARS (02/11/2022); Upper gastrointestinal endoscopy (N/A, 02/17/2022); IR EMBOLIZATION HEMORRHAGE (Right, 02/25/2022); Upper gastrointestinal endoscopy (N/A, 2/27/2022); laparotomy (N/A, 2/27/2022); Foot Debridement (Left, 3/8/2022); Cholecystectomy; Dilatation, esophagus; and Endoscopy, colon, diagnostic. Restrictions  Restrictions/Precautions  Restrictions/Precautions: General Precautions,Fall Risk  Position Activity Restriction  Other position/activity restrictions: RUE PICC, wound vacs to L foot and sacrum, rea     Subjective   General  Chart Reviewed: Yes  Patient assessed for rehabilitation services?: Yes    Additional Pertinent Hx: PMHx: CHF, HTN, B hallux amputations (2019 and 2020). Per H&P: \"Pt with prolonged hospital stay secondary to COVID including intubation for acute cardiac arrest, extubated 2/9/22, GI bleed requiring multiple blood transfusion and surgery. 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 On 2/13/22 respiratory cx with Candida albicans He had an MSSA bacteremia from his foot abscess and is s/p debridement 3/8/22 with placement of would vac. \"    Family / Caregiver Present: No  Referring Practitioner: Estevan Mckinney MD    Diagnosis: COVID pneumonia; respiratory failure requiring intubation; GIB; multiple infections with MSSA bacteremia with L foot abscess, L hand abscess, OM, RUL abscess; multiple wounds with wounds vac to sacrum and L foot. Subjective  Subjective: Pt supine in bed upon OT arrival. Pt reports a partial tear to L shoulder with c/o pain in external rotation and horizontal adduction.   General Comment  Comments: RN cleared patient for therapy      Objective    ADL  Feeding: Independent  Grooming: Increased time to complete;Stand by assistance;Setup (seated in w/c to brush teeth, comb hair, wash face)           Balance  Sitting Balance: Contact guard assistance  Standing Balance: Dependent/Total (Max A x2 in parallel bars with bilateral knee block)  Standing Balance  Time: 5 to 15 seconds x5 reps  Activity: static standing in parallel bars, weightshifting in parallel bars  Comment: max A x2 with bilateral UE support on parallel bars. PT provides bilateral knee block and facilitation of weightshifting and BLE placement. OT provides facilitation for trunk control, trunk extension for upright posture, BUE placement,    Bed mobility  Supine to Sit: Moderate assistance     Transfers  Sit to stand: Dependent/Total  Stand to sit: Dependent/Total  Transfer Comments: Max A x2 people  to/from elevated EOB x1 rep and w/c x1 rep using [place]. Max Ax2 people to/from w/c x5 reps in parallel bars with bilateral knee block. Cognition  Overall Cognitive Status: WFL        Type of ROM/Therapeutic Exercise  Type of ROM/Therapeutic Exercise: AROM; Free weights  Comment: BUE exercises completed seated in chair. Pt reports partial tear to L shoulder; exercises modified L shoulder for AAROM and gentle AROM within comfort. Exercises  Scapular Protraction: 2x10  Scapular Retraction: 2x10  Shoulder Flexion: AAROM L shoulder using table as active assist x5 reps, using RUE as active assist x10 reps. Elbow Flexion: 3x7 RUE using 3lb weight. 2x6 using 2lb weight LUE  Other: modified chest press using 2lb dowel 2x10 reps. Cervical AROM 2x10 reps of the following exercises: flexion, extension, lateral flexion, rotation.         Plan   Plan  Times per week: 5-7x per week  Current Treatment Recommendations: Jevon Mckee training,Pain Management,Patient/Caregiver Education & Training,Self-Care / ADL,Positioning,Functional Mobility Training,Home Management Training,Safety Education & Training,Wheelchair Mobility Training,Equipment Evaluation, Education, & procurement    Goals  Short term goals  Time Frame for Short term goals: Pt will complete UB dressing with min A  Short term goal 1: Pt will complete LB dressing with mod A  Short term goal 2: Pt will complete LB bathing with mod A  Short term goal 3: Pt will complete UB bathing with SBA  Short term goal 4: Pt will complete 3 grooming task at w/c level  Short term goal 5: Pt will complete x20 reps of BUE HEP to improve UB strength/endurance for repositioning and UB ADLs  Long term goals  Time Frame for Long term goals : 3 weeks (4/05)  Long term goal 1: Pt will complete total body dressing with supv  Long term goal 2: Pt will complete total body bathing with supv  Long term goal 3: Pt will complete 3 grooming tasks at sink with mod I  Long term goal 4: Pt will perform functional transfers with mod I  Patient Goals   Patient goals : 1 week (4/21)       Therapy Time   Individual Concurrent Group Co-treatment   Time In 1000     0830   Time Out 1030     0930   Minutes 30     60   Timed Code Treatment Minutes: [address], [place]

## 2022-04-16 NOTE — PROGRESS NOTES
Dreama Duverney  4/16/2022  [id number]    Chief Complaint: Critical illness myopathy    Subjective:   Patient seen resting in bedside chair this am. States he is currently experiencing sacral pain due to sitting on wound vac. No acute events overnight. ROS: denies f/c, sob, cp  Objective:  Patient Vitals for the past 24 hrs:   BP Temp Temp src Pulse Resp SpO2   04/15/22 1945 (!) 144/74 97.7 °F (36.5 °C) Oral 84 18 94 %   04/15/22 0911 (!) 148/73 98.8 °F (37.1 °C) Oral 98 18 97 %     Gen: No distress, pleasant. HEENT: Normocephalic, atraumatic. CV: Regular rate and rhythm. Resp: No respiratory distress. Abd: Soft, nontender  Ext: No edema. Neuro: Alert, oriented, appropriately interactive. Wt Readings from Last 3 Encounters:   04/13/22 236 lb 1.8 oz (107.1 kg)   03/10/22 255 lb 1.6 oz (115.7 kg)   10/14/21 282 lb (127.9 kg)       Laboratory data:   Lab Results   Component Value Date    WBC 5.4 04/14/2022    HGB 8.3 (L) 04/14/2022    HCT 23.9 (L) 04/14/2022    MCV 94.0 04/14/2022     04/14/2022       Lab Results   Component Value Date     04/14/2022    K 4.1 04/14/2022    CL 99 04/14/2022    CO2 27 04/14/2022    BUN 17 04/14/2022    CREATININE 1.0 04/14/2022    GLUCOSE 193 04/14/2022    CALCIUM 8.7 04/14/2022        Therapy progress:  PT  Position Activity Restriction  Other position/activity restrictions: RUE PICC, wound vacs to L foot and sacrum, rea  Objective     Sit to Stand: Unable to assess  Stand to sit: Unable to assess  Bed to Chair: Unable to assess     OT  PT Equipment Recommendations  Other: TBD pending progress and increasead functional mobility assessment     Assessment        SLP  Current Diet : Regular  Current Liquid Diet : Thin  Diet Solids Recommendation: Regular  Liquid Consistency Recommendation: Thin    Body mass index is 32.02 kg/m².     Assessment and Plan:  Jaden Kessler is a 42year old male with a past medical history significant for DM2, diabetic foot ulcer with multiple amputations, HFrEF who has had a prolonged hospital course following Covid pneumonia with complications including respiratory failure, GIB, and multiple infections. He was [facility] to [facility] on 4/13/22 due to functional deficits below his baseline.      Critical Illness Myopathy  - due to covid pneumonia  - PT, OT, ST     MSSA bactermia  - with left foot abscess, left hand abscess, osteomyelitis, RUL abscess  - transitioned to oral doxycycline to continue through 4/28     Multiple wounds POA  - wound vac to sacral wound and left foot wound  - wound care     HFrEF  - EF 35%  - lasix 40 mg BID  - electrolyte replacement  - daily weights, I/O     CKD  - RODRICK due to sepsis, cardiac arrest. Required dialysis during acute stay  - Cr stbale 1.6-2 range  - monitor     Atrial Fibrillation  - BB  - AC contraindicated due to bleeding risk    HTN  - started lisinopril  - adjust medications as needed     DM2 complicated by neuropathy  - lantus plus SSI  - glucose remains uncontrolled, increase lantus to 17     Diabetic Neuropathy  - gabapentin      Acute blood loss anemia  - recent GIB s/p embolization of gastroduodenal artery on 2/25 and ex-lap  - monitor and transfuse for Hb<7     History of GIB  - PPI     Nausea  - PRN zofran and compazine  - start reglan  - if persistent, consider GI consult as patient has a complicated history     Urinary Retention  - flomax  - consider voiding trial soon     Chronic Pain  - dilaudid 2 mg q6 hours, wean as able  - oxycodone PRN     Bowels: Schedule stool softener. Follow bowel movements. Enema or suppository if needed.      Bladder: Check PVR x 3.   [facility] if PVR > 200ml or if any volume is > 500 ml.      Sleep: Trazodone provided prn.      Follow up appointments: PCP, Cardiology, wound care    Electronically signed by EVANS Gr - CNP on 4/16/2022 at 8:44 AM

## 2022-04-16 NOTE — PROGRESS NOTES
Physical Therapy  Facility/Department: [facility]  Daily Treatment Note  NAME: Lucia Moe  : 1975  MRN: [id number]    Date of Service: 2022    Discharge Recommendations:  Continue to assess pending progress,24 hour supervision or assist,Home with Home health PT   PT Equipment Recommendations  Other: TBD pending progress and increasead functional mobility assessment    Assessment    Body structures, Functions, Activity limitations: Decreased functional mobility ; Decreased strength;Decreased endurance;Decreased balance;Decreased sensation; Increased pain  Assessment: Patient seen as co-treat with OT to facilitate safe transfers and standing balance training. Patient completed supine>sit with mod A for trunk with increased time. Pt completes STS with herb stedy and max A x2 and transfers bed>w/c>recliner with herb stedy dependently. Pt completed multiple stands in // bars with max A x2 pulling up on both bars. Pt cued for upright posture, knee and hip extension. Pt demos improved # of stands tolerated this session as well as improved duration of standing ~20 seconds for first 2 stands in // bars. Pt limited by fatigue, weakness, and endurance. Pt completed short distance w/c propulsion with min cues and supervision. Treatment Diagnosis: impaired strength and activity tolerance  Prognosis: Good  Decision Making: High Complexity  PT Education: Goals;PT Role;Plan of Care;Transfer Training;General Safety; Functional Mobility Training;Disease Specific Education;Home Exercise Program;Precautions  Patient Education: pt educated on posture, technique for transfers, benefits of progressing standing - demos understanding  REQUIRES PT FOLLOW UP: Yes     Patient Diagnosis(es): There were no encounter diagnoses.      has a past medical history of Abscess of left foot, Abscess of left hand, Acute encephalopathy, Acute hypoxemic respiratory failure ([address] Acute osteomyelitis of left hand ([facility] Acute osteomyelitis of right hallux [facility]), Cardiopulmonary arrest ([address] Cellulitis of left foot, CHF (congestive heart failure) ([address] Chronic osteomyelitis of left foot ([address] Closed displaced fracture of distal phalanx of right little finger, Clostridium difficile infection, Diabetic ulcer of left forefoot associated with type 2 diabetes mellitus, with necrosis of bone ([address] Diabetic ulcer of right great toe associated with type 2 diabetes mellitus, with necrosis of bone ([address] Enterococcal infection, 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 has a past surgical history that includes Tonsillectomy; [place] tooth extraction; Knee arthroscopy (Left, 08/15/2013); Toe amputation (Right, 08/23/2019); other surgical history (Left, 07/24/2020); Toe amputation (Left, 07/24/2020); Toe amputation (Left, 10/22/2020); Foot Debridement (Left, 02/01/2022); Arm Surgery (Left, 02/05/2022); IR NONTUNNELED VASCULAR CATHETER > 5 YEARS (02/11/2022); Upper gastrointestinal endoscopy (N/A, 02/17/2022); IR EMBOLIZATION HEMORRHAGE (Right, 02/25/2022); Upper gastrointestinal endoscopy (N/A, 2/27/2022); laparotomy (N/A, 2/27/2022); Foot Debridement (Left, 3/8/2022); Cholecystectomy; Dilatation, esophagus; and Endoscopy, colon, diagnostic.     Restrictions  Restrictions/Precautions  Restrictions/Precautions: General Precautions,Fall Risk  Position Activity Restriction  Other position/activity restrictions: RUE PICC, wound vacs to L foot and sacrum, álvaro  Subjective   General  Chart Reviewed: Yes  Additional Pertinent Hx: PMHx: CHF, HTN, B hallux amputations (2019 and 2020). Per H&P: \"Pt with prolonged hospital stay secondary to COVID including intubation for acute cardiac arrest, extubated 2/9/22, GI bleed requiring multiple blood transfusion and surgery. 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 On 2/13/22 respiratory cx with Candida albicans He had an MSSA bacteremia from his foot abscess and is s/p debridement 3/8/22 with placement of would vac. \"  Response To Previous Treatment: Patient with no complaints from previous session. Family / Caregiver Present: Yes  Referring Practitioner: Viviane Berry MD  Subjective  Subjective: pt in bed, agreeable to therapy          Orientation  Orientation  Overall Orientation Status: Within Functional Limits     Objective   Bed mobility  Supine to Sit: Moderate assistance (HOB elevated)  Sit to Supine: Unable to assess  Transfers  Sit to Stand: Maximum Assistance;Dependent/Total;2 Person Assistance (max A x2 2x with herb stedy, 5x in // bars)  Stand to sit: Maximum Assistance;Dependent/Total;2 Person Assistance (max A x2 2x with herb stedy, 5x in // bars)  Bed to Chair: Dependent/Total (herb stedy)  Comment: Pt completes 5 total stands in // bars. 1st stand, pt stands statically ~20 seconds. 2nd stand, pt stands ~20 seconds and completes lateral weight shifts with cues. 3rd-5th stands, pt stands briefly but does not tolerate more d/t fatigue. Pt completed 1x STS from bed with herb stedy and 1x STS from w/c with herb stedy to complete transfers. Pt requires cues for knee, hip, and trunk extension, proper foot placement, and encouragement to continue to progress.   Ambulation  Ambulation?: No  Stairs/Curb  Stairs?: No  Wheelchair Activities  Wheelchair Parts Management: Yes  Left Brakes Level of Assistance: Supervision  Right Brakes Level of Assistance: Supervision  Propulsion: Yes  Propulsion 1  Propulsion: Manual  Level: Level Tile  Method: RUE;LUE  Level of Assistance: Supervision  Description/ Details: 50 ft with 1 L turn through doorway and 180 degree turn in room to navigate into bathroom. Pt requires cues to lock brakes. Exercises  Knee Long Arc Quad: 1x 10 BLE through partial ROM                     Addendum Second Session  Assessment: Pt in bed at beginning and end of session. Pt reports he sat up in chair ~2 hours this AM prior to discomfort and requesting to transfer back into bed. Pt agreeable to supine exercises and sitting EOB to complete further exercises and reposition at end of session. Pt in supine with needs met EOS. Exercises:  -1x 15 dax adduction with 5 sec holds against pillow  -1x 15 BLE heel slides with therapist supporting under heel   -1x 15 BLE supine hip abduction with therapist supporting under heel  -1x 15 BLE SAQ  -1x 15 dax glute sets  -1x 15 BLE LAQ  -1x 15 BLE seated marches through partial ROM  -1x 15 dax scap squeezes  -x30 seconds reaching outside CRYSTAL with RUE - limited d/t BUE pain  -1x 15 modified sit ups at EOB    Bed mobility: Pt completes supine>sit with min A with HOB elevated and increased time. Pt completes sit>supine with mod A for BLE. Pt requires max A to scoot toward [facility] with bed positioned in trendelenburg.      Pt sat EOB x10 mins while completing seated exercises    Goals  Short term goals  Time Frame for Short term goals: 10 days- 4/22/22  Short term goal 1: Pt will complete bed mobility with min A  Short term goal 2: Pt will complete functional transfers with max A x 1 using LRAD  Short term goal 3: Pt will propel manual w/c 50ft with min A  Short term goal 4: Pt will tolerate gait assessment and appropriate LTG to be set  Long term goals  Time Frame for Long term goals : 3 weeks- 5/4/22  Long term goal 1: Pt will complete bed mobility with supervision  Long term goal 2: Pt will complete functional transfers with min A using LRAD  Long term goal 3: Pt will propel manual w/c 150ft with mod I  Patient Goals   Patient goals : \"to walk again\"    Plan    Plan  Times per week: 5 out of 7 days/week  Plan weeks: 3 weeks  Current Treatment Recommendations: Strengthening,Balance Training,Functional Mobility Training,Transfer Training,Gait Training,Stair training,Wheelchair Mobility  Exercise Program,Safety Education & Training,Patient/Caregiver Education & Training,Equipment Evaluation, Education, & procurement  Safety Devices  Type of devices:  All fall risk precautions in place,Call light within reach,Nurse notified,Patient at risk for falls,Left in chair,Chair alarm in place,Gait belt  Restraints  Initially in place: No     Therapy Time   Individual Concurrent Group Co-treatment   Time In       0830   Time Out       0930   Minutes       60   Timed Code Treatment Minutes: 60 Minutes     Second Session Therapy Time:   Individual Concurrent Group Co-treatment   Time In 1430        Time Out 1500        Minutes 30          Timed Code Treatment Minutes:  90 mins    Sae Hurst, PT

## 2022-04-16 NOTE — PLAN OF CARE
Problem: Pain:  Goal: Pain level will decrease  Description: Pain level will decrease  Outcome: Ongoing   Pt will be satisfied with pain control. Pt uses numeric pain rating scale with reassessments after pain med administration. Will continue to monitor progression throughout shift.

## 2022-04-17 NOTE — PLAN OF CARE
Problem: Pain:  Goal: Pain level will decrease  Description: Pain level will decrease  Outcome: Ongoing  Goal: Control of acute pain  Description: Control of acute pain  Outcome: Ongoing  Goal: Control of chronic pain  Description: Control of chronic pain  Outcome: Ongoing     Problem: Falls - Risk of:  Goal: Will remain free from falls  Description: Will remain free from falls  Outcome: Ongoing  Goal: Absence of physical injury  Description: Absence of physical injury  Outcome: Ongoing     Problem: Skin Integrity:  Goal: Will show no infection signs and symptoms  Description: Will show no infection signs and symptoms  Outcome: Ongoing  Goal: Absence of new skin breakdown  Description: Absence of new skin breakdown  Outcome: Ongoing     Problem: Nutrition  Goal: Optimal nutrition therapy  Outcome: Ongoing

## 2022-04-17 NOTE — PLAN OF CARE
Problem: Falls - Risk of:  Goal: Will remain free from falls  Description: Will remain free from falls  4/17/2022 1325 by Rommel Archibald RN  Outcome: Ongoing  Note: Fall risk band on patient. Non skid footwear in place. Alarms used appropriately. Patient instructed to call and wait for staff before getting up. Rounding done to anticipate needs. Appropriate safety devices used for transfers. Bedside table within reach. Call light within reach. Pt denies further needs at this time. Nursing will continue to monitor for changes.      4/17/2022 0148 by Sigifredo Mcmanus RN  Outcome: Ongoing

## 2022-04-18 NOTE — PROGRESS NOTES
[facility] Nurse  Follow-up Progress Note       NAME:  Jono Stephens  MEDICAL RECORD NUMBER:  [id number]  AGE:  42 y.o. GENDER:  male  :  1975  TODAY'S DATE:  2022    Subjective: Hi, yes I have stood up on my feet with therapy. Wound Identification:  Wound Type: : pressure and traumatic  Contributing Factors: edema, diabetes, chronic pressure, decreased mobility, shear force, obesity, decreased tissue oxygenation, incontinence of stool and incontinence of urine      Patient Goal of Care:  [x] Wound Healing  [] Odor Control  [] Palliative Care  [] Pain Control   [] Other:     Objective:  Dressing to sacral tracked to left hip and top of left foot secure with small serosanguinous  drainage noted in container. Munoz attached with yellow urine. /66   Pulse 85   Temp 97.5 °F (36.4 °C) (Oral)   Resp 16   Ht 6' (1.829 m)   Wt 236 lb 1.8 oz (107.1 kg)   SpO2 96%   BMI 32.02 kg/m²   Shimon Risk Score: Shimon Scale Score: 16       Assessment: Left foot red with granulation present. Edges moist, macerated. Sacrum red with small of amount of bloody seepage with dressing change. Small amount of slough present to inner bed of wound. Measurements:  Negative Pressure Wound Therapy Foot Left;Dorsal (Active)   $ Standard NPWT >50 sq cm PER TX $ Yes 22 1108   Wound Type Pressure ulcer: Stage IV;Surgical 22 1641   Unit Type [employer] rental [facility] [id number] 22 1641   Dressing Type Black foam 22 1641   Number of pieces used 3 22 1641   Cycle Continuous 22 1641   Target Pressure (mmHg) 125 22 1641   Intensity 1 22 1641   Canister changed?  No 22 1641   Dressing Status Changed 22 1641   Dressing Changed Changed/New 22 1641   Drainage Amount Scant 22 1641   Drainage Description Serosanguinous 22 1641   Dressing Change Due 22 1641   Wound Assessment Red;Pink;Granulation tissue 04/18/22 1641   Shape foot oval, coccyx heart shaped 04/18/22 1641   Odor None 04/18/22 1641   Number of days: 75       Wound 01/24/22 Hand Left;Dorsal (Active)   Wound Image   04/14/22 1108   Wound Etiology Burn 04/18/22 0858   Dressing Status Other (Comment) 04/15/22 0911   Wound Cleansed Not Cleansed 04/18/22 0858   Dressing/Treatment Open to air 04/18/22 0858   Distance Tunneling (cm) 0 cm 04/14/22 1108   Tunneling Position ___ O'Clock 0 04/14/22 1108   Undermining Starts ___ O'Clock 0 04/14/22 1108   Undermining Ends___ O'Clock 0 04/14/22 1108   Undermining Maxium Distance (cm) 0 04/14/22 1108   Wound Assessment Pink/red 04/14/22 1108   Drainage Amount None 04/14/22 1108   Odor None 04/14/22 1108   Shanita-wound Assessment Dry/flaky; Intact 04/14/22 1108   Margins Attached edges; Defined edges 04/14/22 1108   Number of days: 84       Wound 01/24/22 Foot Left;Dorsal I & D to left dorsal foot by Dr. Shelly Herzog, surgical wound (Active)   Number of days: 84       Wound 01/30/22 Sacrum SDTI (Active)   Wound Image   04/14/22 1108   Wound Etiology Pressure Stage  4 04/18/22 1641   Dressing Status New dressing applied 04/18/22 1641   Wound Cleansed Irrigated with saline 04/18/22 1641   Dressing/Treatment Negative pressure wound therapy 04/18/22 1641   Offloading for Diabetic Foot Ulcers Offloading ordered; Other (comment) 04/18/22 1641   Dressing Change Due 04/20/22 04/18/22 1641   Wound Length (cm) 11 cm 04/14/22 1108   Wound Width (cm) 8 cm 04/14/22 1108   Wound Depth (cm) 2.5 cm 04/14/22 1108   Wound Surface Area (cm^2) 88 cm^2 04/14/22 1108   Change in Wound Size % (l*w) -340 04/14/22 1108   Wound Volume (cm^3) 220 cm^3 04/14/22 1108   Wound Healing % -1518 04/14/22 1108   Distance Tunneling (cm) 0 cm 04/14/22 1108   Tunneling Position ___ O'Clock 0 04/14/22 1108   Undermining Starts ___ O'Clock 0 04/14/22 1108   Undermining Ends___ O'Clock 0 04/14/22 1108   Undermining Maxium Distance (cm) 0 04/14/22 1108   Wound Assessment Pink/red;Slough;Granulation tissue 04/18/22 1641   Drainage Amount Small 04/18/22 1641   Drainage Description Serosanguinous 04/18/22 1641   Odor None 04/18/22 1641   Shanita-wound Assessment Dry/flaky; Intact 04/18/22 1641   Margins Other (Comment) 04/18/22 1641   Wound Thickness Description not for Pressure Injury Full thickness 04/18/22 1641   Number of days: 78       Wound 02/14/22 Head Left; Lower; Posterior Friction/pressure wound-unstageable. 4/14/22 healed now, will complete LDA per CWOCN (Active)   Wound Image   04/14/22 1115   Wound Etiology Other 04/18/22 0858   Dressing Status Other (Comment) 04/15/22 0911   Wound Cleansed Not Cleansed 04/18/22 0858   Dressing/Treatment Open to air 04/18/22 0858   Wound Length (cm) 1.4 cm 04/14/22 1115   Wound Width (cm) 3.5 cm 04/14/22 1115   Wound Depth (cm) 0 cm 04/14/22 1115   Wound Surface Area (cm^2) 4.9 cm^2 04/14/22 1115   Change in Wound Size % (l*w) 56.44 04/14/22 1115   Wound Volume (cm^3) 0 cm^3 04/14/22 1115   Wound Healing % 100 04/14/22 1115   Distance Tunneling (cm) 0 cm 04/14/22 1115   Tunneling Position ___ O'Clock 0 04/14/22 1115   Undermining Starts ___ O'Clock 0 04/14/22 1115   Undermining Ends___ O'Clock 0 04/14/22 1115   Undermining Maxium Distance (cm) 0 04/14/22 1115   Wound Assessment Pink/red 04/15/22 0911   Drainage Amount None 04/15/22 0911   Odor None 04/15/22 0911   Shanita-wound Assessment Dry/flaky; Intact 04/15/22 0911   Margins Attached edges; Defined edges 04/14/22 1115   Number of days: 63     Incision 02/27/22 Abdomen (Active)   Wound Image   04/14/22 1108   Dressing Status Other (Comment) 04/18/22 0858   Incision Cleansed Not Cleansed 04/18/22 0858   Dressing/Treatment Open to air 04/18/22 0858   Incision Length (cm) 11 04/14/22 1108   Incision Width (cm) 0 cm 04/14/22 1108   Incision Depth (cm) 0 cm 04/14/22 1108   Margins Approximated 04/18/22 0858   Incision Assessment Dry 04/18/22 0858   Drainage Amount None 04/18/22 0858   Odor None 04/18/22 0858   Number of days: 50       Incision 03/08/22 Foot Anterior; Left (Active)   Wound Image   04/14/22 1108   Dressing Status New dressing applied 04/18/22 1641   Dressing Change Due 04/20/22 04/18/22 1641   Incision Cleansed Cleansed with saline 04/18/22 1641   Dressing/Treatment Negative pressure wound therapy 04/18/22 1641   Incision Length (cm) 2 04/14/22 1108   Incision Width (cm) 6 cm 04/14/22 1108   Incision Depth (cm) 0.9 cm 04/14/22 1108   Margins Other (Comment) 04/14/22 1108   Incision Assessment Epithelialization 04/18/22 1641   Drainage Amount Small 04/18/22 1641   Drainage Description Serosanguinous 04/18/22 1641   Odor None 04/18/22 1641   Shanita-incision Assessment Dry/flaky; Intact 04/18/22 1641   Number of days: 41       Response to treatment:  Well tolerated by patient. Pain Assessment:  Severity:  0 / 10  Quality of pain: N/A  Wound Pain Timing/Severity: none  Premedicated: No  Plan:   Plan of Care: Wound 01/30/22 Sacrum SDTI-Dressing/Treatment: Negative pressure wound therapy  Wound 02/14/22 Head Left; Lower; Posterior Friction/pressure wound-unstageable. 4/14/22 healed now, will complete LDA per CWOCN-Dressing/Treatment: Open to air  Wound 01/24/22 Hand Left;Dorsal-Dressing/Treatment: Open to air      Recommendation;  Clean wounds with normal saline. NPWT to mid coccyx and left dorsal foot. Change 3 times a week. Clean feet and legs with warm soapy water, foamy cleanser or bath wipes daily. Pat dry. Apply aquapor ointment to feet and legs daily. Wound care to follow. Call wound care for deterioration [phone] or call [phone] and leave message. Wound VAC is I rental  VFVR [id number] on continuous cycle, 1 intensity, 125 mmHg  3 black sponges to sacrum,  1 black sponge to top of left foot.     Specialty Bed Required : Yes Dolphine  [x] Low Air Loss   [x] Pressure Redistribution  [] Fluid Immersion  [] Bariatric  [] Total Pressure Relief  [] Other:     Current Diet: ADULT DIET; Regular; 4 carb choices (60 gm/meal)  ADULT ORAL NUTRITION SUPPLEMENT; Breakfast, Dinner; Diabetic Oral Supplement  Dietician consult:  No    Discharge Plan:  Placement for patient upon discharge: Unknown at this time  Patient appropriate for Outpatient [address]: Yes    Referrals:  [x]  following  [] [address] [facility]  [] Supplies  [] Other    Patient/Caregiver Teaching: Reminded to watch VAC tubing and urinary catheter tubing as to not to lay on due to will cause pressure injury.   Level of patient/caregiver understanding able to:   [] Indicates understanding       [] Needs reinforcement  [] Unsuccessful      [x] Verbal Understanding  [] Demonstrated understanding       [] No evidence of learning  [] Refused teaching         [] N/A       Electronically signed by Willam Mejía RN, on 4/18/2022 at 4:48 PM

## 2022-04-18 NOTE — PROGRESS NOTES
Occupational Therapy  Facility/Department: [facility]  Daily Treatment Note  NAME: Ann-Marie Solis  : 1975  MRN: [id number]    Date of Service: 2022    Discharge Recommendations:  Continue to assess pending progress,Patient would benefit from continued therapy after discharge  OT Equipment Recommendations  ADL Assistive Devices: Grab Bars - shower;Transfer Tub Bench  Other: CTA pending progress    Assessment   Performance deficits / Impairments: Decreased functional mobility ; Decreased ADL status; Decreased endurance;Decreased strength;Decreased sensation;Decreased ROM; Decreased balance;Decreased high-level IADLs;Decreased coordination;Decreased posture  Assessment: First session: Pt tolerated therapy session well. Pt is total assist for toileting at bed level, supv to mod A for bed mobility, and total assist for LB dressing. Pt completing functional transfers x5 reps in parallel bars today with Max A x2 people and bilateral knee block. Recommend continued OT services to increase safety and independence with ADLs and functional transfers. Second session: Pt tolerated sitting at EOB for 20 minutes while completing UB sponge bath. Pt is SBA for UB dressing and grooming tasks at EOB, mod A for sit<>supine, and dependent for scooting up in bed. Pt continues to be limited by decreased activity tolerance and general weakness. Cont POC. Prognosis: Fair  REQUIRES OT FOLLOW UP: Yes  Activity Tolerance  Activity Tolerance: Patient Tolerated treatment well;Patient limited by fatigue;Patient limited by pain  Activity Tolerance: Vitals supine: 117/72, 81 bpm, 02 95% on RA         Patient Diagnosis(es): There were no encounter diagnoses.       has a past medical history of Abscess of left foot, Abscess of left hand, Acute encephalopathy, Acute hypoxemic respiratory failure ([facility] [address] Acute osteomyelitis of left hand ([facility] [address] Acute osteomyelitis of right hallux [facility]), Cardiopulmonary arrest ([facility] [address] Cellulitis of left foot, CHF (congestive heart failure) ([address] Chronic osteomyelitis of left foot ([address] Closed displaced fracture of distal phalanx of right little finger, Clostridium difficile infection, Diabetic ulcer of left forefoot associated with type 2 diabetes mellitus, with necrosis of bone ([address] Diabetic ulcer of right great toe associated with type 2 diabetes mellitus, with necrosis of bone ([address] Enterococcal infection, 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 has a past surgical history that includes Tonsillectomy; Gaston tooth extraction; Knee arthroscopy (Left, 08/15/2013); Toe amputation (Right, 08/23/2019); other surgical history (Left, 07/24/2020); Toe amputation (Left, 07/24/2020); Toe amputation (Left, 10/22/2020); Foot Debridement (Left, 02/01/2022); Arm Surgery (Left, 02/05/2022); IR NONTUNNELED VASCULAR CATHETER > 5 YEARS (02/11/2022); Upper gastrointestinal endoscopy (N/A, 02/17/2022); IR EMBOLIZATION HEMORRHAGE (Right, 02/25/2022); Upper gastrointestinal endoscopy (N/A, 2/27/2022); laparotomy (N/A, 2/27/2022); Foot Debridement (Left, 3/8/2022); Cholecystectomy; Dilatation, esophagus; and Endoscopy, colon, diagnostic.     Restrictions  Restrictions/Precautions  Restrictions/Precautions: General Precautions,Fall Risk  Position Activity Restriction  Other position/activity restrictions: RUE PICC, wound vacs to L foot and sacrum, rea  Subjective   General  Chart Reviewed: Yes  Patient assessed for rehabilitation services?: Yes  Additional Pertinent Hx: PMHx: CHF, HTN, B hallux amputations (2019 and 2020). Per H&P: \"Pt with prolonged hospital stay secondary to COVID including intubation for acute cardiac arrest, extubated 2/9/22, GI bleed requiring multiple blood transfusion and surgery. 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 On 2/13/22 respiratory cx with Candida albicans He had an MSSA bacteremia from his foot abscess and is s/p debridement 3/8/22 with placement of would vac. \"  Family / Caregiver Present: No  Referring Practitioner: Yareli Castañeda MD  Diagnosis: COVID pneumonia; respiratory failure requiring intubation; GIB; multiple infections with MSSA bacteremia with L foot abscess, L hand abscess, OM, RUL abscess; multiple wounds with wounds vac to sacrum and L foot. Subjective  Subjective: Pt supine in bed upon therapy staff arrival and reporting recent bowel movement. Pt requesting assistance with toileting at bed level and dressing. General Comment  Comments: RN cleared patient for therapy  Pain Assessment  Pain Assessment: 0-10  Pain Level: 5  Pain Type: Acute pain  Pain Location: Sacrum; Buttocks  Pain Orientation: Right;Left  Pain Descriptors: Aching  Pain Frequency: Continuous  Non-Pharmaceutical Pain Intervention(s): Distraction; Ambulation/Increased Activity; Emotional support;Repositioned  Response to Pain Intervention: Patient Satisfied  Pre Treatment Pain Screening  Intervention List: Patient able to continue with treatment  Vital Signs  Patient Currently in Pain: Yes   Orientation  Orientation  Overall Orientation Status: Within Functional Limits  Objective    ADL  LE Dressing: Dependent/Total (socks, briefs, and shorts. Assistance required for wound vac and catheter line managment.  2 person assist for clothing management while standing in stedy)  Toileting: Dependent/Total (assist for all parts at bed level)  Additional Comments: Pt declining further ADLs at this time        Balance  Sitting Balance: Contact guard assistance  Standing Balance: Dependent/Total (Max A x2 in parallel bars with bilateral knee block)  Standing Balance  Time: 5x 10-20 seconds  Activity: static standing in parallel bars, clothing management in stedy, transfer training  Comment: max Ax2 for static standing balance x3 STS in // bars. B knee block provided with faciliation of BLE placement and trunk extension. Cues provided for hip extension and upright posture. Pt completed with biofeedback provided via mirror. Transfers  Sit to stand: Maximum assistance;2 Person assistance  Stand to sit: Maximum assistance;2 Person assistance  Transfer Comments: Max A x2 people  to/from elevated EOB x1 rep and w/c x1 rep using Jewels Boyle.      Cognition  Overall Cognitive Status: WFL           Plan   Plan  Times per week: 5-7x per week  Current Treatment Recommendations: [place] Training,Stair training,Pain Management,Patient/Caregiver Education & Training,Self-Care / ADL,Positioning,Functional Mobility Training,Home Management Training,Safety Education & Training,Wheelchair Mobility Training,Equipment Evaluation, Education, & procurement    Goals  Short term goals  Time Frame for Short term goals: Pt will complete UB dressing with min A  Short term goal 1: Pt will complete LB dressing with mod A  Short term goal 2: Pt will complete LB bathing with mod A  Short term goal 3: Pt will complete UB bathing with SBA  Short term goal 4: Pt will complete 3 grooming task at w/c level  Short term goal 5: Pt will complete x20 reps of BUE HEP to improve UB strength/endurance for repositioning and UB ADLs  Long term goals  Time Frame for Long term goals : 3 weeks (4/05)  Long term goal 1: Pt will complete total body dressing with supv  Long term goal 2: Pt will complete total body bathing with supv  Long term goal 3: Pt will complete 3 grooming tasks at sink with mod I  Long term goal 4: Pt will perform functional transfers with mod I  Patient Goals   Patient goals : 1 week (4/21)       Therapy Time   Individual Concurrent Group Co-treatment   Time In       0900   Time Out       1000   Minutes       60   Timed Code Treatment Minutes: 60 Minutes      Second Session Therapy Time:   Individual Concurrent Group Co-treatment   Time In 1400        Time Out 1430        Minutes 30          Timed Code Treatment Minutes:  30 Minutes    Total Treatment Minutes:  90 minutes      Mariam Winter OT

## 2022-04-18 NOTE — PROGRESS NOTES
Physical Therapy  [facility]  Daily Treatment Note  NAME: Han Treadwell  : 1975  MRN: [id number]    Date of Service: 2022    Discharge Recommendations:  Continue to assess pending progress,24 hour supervision or assist,Home with Home health PT   PT Equipment Recommendations  Other: TBD pending progress and increasead functional mobility assessment    Assessment    Body structures, Functions, Activity limitations: Decreased functional mobility ; Decreased strength;Decreased endurance;Decreased balance;Decreased sensation; Increased pain  Assessment: Patient seen as co-treat with OT to optimize mobility d/t level of assist for transfers. Pt required assistance for pericare in bed d/t pt with BM at start of session. Pt completed rolling with supervision toward R and with min A toward L with use of bedrail. Pt completed STS with use of herb stedy to don pants and transfer to w/c with max A X2. Pt completes 3 stands lasting ~20 seconds in // bars with BUE support and dax knee block with assist for lift and balance. Pt requires cues for posture and technique, fatigues early. Pt will benefit from continued skilled PT in ARU to progress strength and endurance. Treatment Diagnosis: impaired strength and activity tolerance  Prognosis: Good  Decision Making: High Complexity  PT Education: Goals;PT Role;Plan of Care;Transfer Training;General Safety; Functional Mobility Training;Disease Specific Education;Home Exercise Program;Precautions  Patient Education: pt educated on transfer technique, posture, and importance of progressing mobility - demos understanding  REQUIRES PT FOLLOW UP: Yes  Activity Tolerance  Activity Tolerance: Patient Tolerated treatment well;Patient limited by fatigue;Patient limited by endurance  Activity Tolerance: 117/72, 95%, 81 bpm; 99%, 85 bpm, and 132/80 after standing. Pt reporting a little bit of dizziness with activity.      Patient Diagnosis(es): There were no encounter diagnoses. has a past medical history of Abscess of left foot, Abscess of left hand, Acute encephalopathy, Acute hypoxemic respiratory failure ([address] Acute osteomyelitis of left hand ([address] Acute osteomyelitis of right hallux ([address] Cardiopulmonary arrest ([address] Cellulitis of left foot, CHF (congestive heart failure) ([address] Chronic osteomyelitis of left foot ([address] Closed displaced fracture of distal phalanx of right little finger, Clostridium difficile infection, Diabetic ulcer of left forefoot associated with type 2 diabetes mellitus, with necrosis of bone ([address] Diabetic ulcer of right great toe associated with type 2 diabetes mellitus, with necrosis of bone ([address] Enterococcal infection, 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 has a past surgical history that includes Tonsillectomy; [place] tooth extraction; Knee arthroscopy (Left, 08/15/2013); Toe amputation (Right, 08/23/2019); other surgical history (Left, 07/24/2020); Toe amputation (Left, 07/24/2020); Toe amputation (Left, 10/22/2020); Foot Debridement (Left, 02/01/2022); Arm Surgery (Left, 02/05/2022); IR NONTUNNELED VASCULAR CATHETER > 5 YEARS (02/11/2022); Upper gastrointestinal endoscopy (N/A, 02/17/2022); IR EMBOLIZATION HEMORRHAGE (Right, 02/25/2022); Upper gastrointestinal endoscopy (N/A, 2/27/2022); laparotomy (N/A, 2/27/2022); Foot Debridement (Left, 3/8/2022);  Cholecystectomy; Dilatation, esophagus; and Endoscopy, colon, diagnostic. Restrictions  Restrictions/Precautions  Restrictions/Precautions: General Precautions,Fall Risk  Position Activity Restriction  Other position/activity restrictions: RUE PICC, wound vacs to L foot and sacrum, rea  Subjective   General  Chart Reviewed: Yes  Additional Pertinent Hx: PMHx: CHF, HTN, B hallux amputations (2019 and 2020). Per H&P: \"Pt with prolonged hospital stay secondary to COVID including intubation for acute cardiac arrest, extubated 2/9/22, GI bleed requiring multiple blood transfusion and surgery. 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 On 2/13/22 respiratory cx with Candida albicans He had an MSSA bacteremia from his foot abscess and is s/p debridement 3/8/22 with placement of would vac. \"  Response To Previous Treatment: Patient with no complaints from previous session.   Family / Caregiver Present: No  Referring Practitioner: June Doran MD  Subjective  Subjective: pt in bed, agreeable to therapy  Pain Screening  Patient Currently in Pain: Yes  Pain Assessment  Pain Assessment: 0-10  Pain Level: 5  Pain Type: Acute pain  Pain Location: Back;Buttocks  Pain Descriptors: Aching  Non-Pharmaceutical Pain Intervention(s): Ambulation/Increased Activity;Repositioned  Vital Signs  Patient Currently in Pain: Yes       Orientation  Orientation  Overall Orientation Status: Within Functional Limits  Cognition      Objective   Bed mobility  Rolling to Left: Supervision (with bedrail)  Rolling to Right: Minimal assistance  Supine to Sit: Moderate assistance (flat bed, use of bedrail)  Transfers  Sit to Stand: Maximum Assistance;Dependent/Total;2 Person Assistance (max A X2 with herb stedy from bed and w/c and in // bars)  Stand to sit: Maximum Assistance;Dependent/Total;2 Person Assistance (max A x2 with herb stedy and in // bars)  Bed to Chair: Dependent/Total (with herb stedy)  Comment: Pt completed all transfers with max Ax2. 1x STS from bed with herb stedy and 2 stands from paddles to don pants and sit in w/c. Pt completes 3 stands lasting ~20 sec in // bars with cues for hand placement, foot placement, scooting to edge of seat, and hip and knee extension. Pt unable to tolerate weight shifts this session. Pt completes additional partial stand but returns to sitting d/t fatigue. Pt completes 1x STS from w/c with herb stedy and 1x from paddles to position in recliner. Ambulation  Ambulation?: No  Stairs/Curb  Stairs?: No                                 Addendum: 2nd session Nando Duque PT, DPT)  Pt seen in pm for second PT session, pt pleasant and agreeable, reports pain in sacral wound from sitting but does not provide a pain score. Pt reports fatigue from earlier session but demonstrates good participation in session. Pt is able to complete partial stand from recliner with maxA x 2 to stedy but unable to complete full stand despite multiple attempts secondary to fatigue and weakness. Skylift used to complete chair to bed. Pt then participates in supine BLE exercises to increase strength and ROM. Bed mobility:  B rolling with BR CGA/Luisa  Sit to supine TD with maxi-move  Transfers:  Trialed x 3 reps STS from recliner to stedy with maxA x 2. Pt able to complete partial stand from recliner but unable to achieve full upright secondary to fatigue and weakness. Trialed x 2 additional reps with pt unable to clear hips from surface. For safety brianna-lift used to t/f to bed with TD. Therapeutic Exercise:  Supine BLE gentle gastroc stretch x 1 minute  Supine BLE Ankle circles and AP x 15 PROM  Supine BLE GS x 15  Supine BLE QS x 15  Supine BLE heel slide x 10, heel supported  Supine BLE hip abduction x 10, heel supported  Intermittent rest d/t fatigue, cues provided for sequence/safety. Pt supine in bed at end of session with bed alarm in place and all needs within reach.   Pt supine in bed at end of session with bed alarm in place and all needs within reach. Goals  Short term goals  Time Frame for Short term goals: 10 days- 4/22/22  Short term goal 1: Pt will complete bed mobility with min A  Short term goal 2: Pt will complete functional transfers with max A x 1 using LRAD  Short term goal 3: Pt will propel manual w/c 50ft with min A  Short term goal 4: Pt will tolerate gait assessment and appropriate LTG to be set  Long term goals  Time Frame for Long term goals : 3 weeks- 5/4/22  Long term goal 1: Pt will complete bed mobility with supervision  Long term goal 2: Pt will complete functional transfers with min A using LRAD  Long term goal 3: Pt will propel manual w/c 150ft with mod I  Patient Goals   Patient goals : \"to walk again\"    Plan    Plan  Times per week: 5 out of 7 days/week  Plan weeks: 3 weeks  Current Treatment Recommendations: Strengthening,Balance Training,Functional Mobility Training,Transfer Training,Gait Training,Stair training,Wheelchair Mobility  Exercise Program,Safety Education & Training,Patient/Caregiver Education & Training,Equipment Evaluation, Education, & procurement  Safety Devices  Type of devices:  All fall risk precautions in place,Call light within reach,Nurse notified,Patient at risk for falls,Left in chair,Chair alarm in place,Gait belt  Restraints  Initially in place: No     Therapy Time   Individual Concurrent Group Co-treatment   Time In       0900   Time Out       1000   Minutes       60   Timed Code Treatment Minutes: [address], PT        Second Session Therapy Time: Braxton Rico PT, DPT   Individual Concurrent Group Co-treatment   Time In 1230        Time Out 1300        Minutes 30          Timed Code Treatment Minutes:  30 minutes    Total Treatment Minutes:  90 minutes    Braxton Rico PT, DPT

## 2022-04-18 NOTE — PROGRESS NOTES
David Peoples  4/18/2022  [id number]    Chief Complaint: Critical illness myopathy    Subjective:   No significant weekend events. No current complaints. Labs reviewed. Glucoses above goal.     ROS: denies f/c, sob, cp    Objective:  Patient Vitals for the past 24 hrs:   BP Temp Temp src Pulse Resp SpO2   04/18/22 0858 117/66 97.5 °F (36.4 °C) Oral 85 16 96 %   04/17/22 1959 (!) 148/74 98.9 °F (37.2 °C) Oral 89 18 94 %     Gen: No distress, pleasant. Resting at bedside   HEENT: Normocephalic, atraumatic. CV: Regular rate and rhythm. Resp: No respiratory distress. Abd: Soft, nontender  Ext: No edema. Skin: [place] to sacrum and L foot with appropriate seal and minimal output  Neuro: Alert, oriented, appropriately interactive.    Wt Readings from Last 3 Encounters:   04/13/22 236 lb 1.8 oz (107.1 kg)   03/10/22 255 lb 1.6 oz (115.7 kg)   10/14/21 282 lb (127.9 kg)       Laboratory data:   Lab Results   Component Value Date    WBC 5.4 04/14/2022    HGB 8.3 (L) 04/14/2022    HCT 23.9 (L) 04/14/2022    MCV 94.0 04/14/2022     04/14/2022       Lab Results   Component Value Date     04/18/2022    K 4.0 04/18/2022     04/18/2022    CO2 28 04/18/2022    BUN 20 04/18/2022    CREATININE 1.2 04/18/2022    GLUCOSE 216 04/18/2022    CALCIUM 8.8 04/18/2022        Therapy progress:  PT  Position Activity Restriction  Other position/activity restrictions: RUE PICC, wound vacs to L foot and sacrum, rea  Objective     Sit to Stand: Maximum Assistance,Dependent/Total,2 Person Assistance (max A X2 with herb stedy from bed and w/c and in // bars)  Stand to sit: Maximum Assistance,Dependent/Total,2 Person Assistance (max A x2 with herb stedy and in // bars)  Bed to Chair: Dependent/Total (with herb stedy)     OT  PT Equipment Recommendations  Other: TBD pending progress and increasead functional mobility assessment     Assessment        SLP  Current Diet : Regular  Current Liquid Diet : Thin  Diet Solids Recommendation: Regular  Liquid Consistency Recommendation: Thin    Body mass index is 32.02 kg/m². Assessment and Plan:  Johny Hendrix is a 42year old male with a past medical history significant for DM2, diabetic foot ulcer with multiple amputations, HFrEF who has had a prolonged hospital course following Covid pneumonia with complications including respiratory failure, GIB, and multiple infections. He was admitted to [facility] on 4/13/22 due to functional deficits below his baseline.      Critical Illness Myopathy  - due to covid pneumonia  - PT, OT, ST     MSSA bactermia  - with left foot abscess, left hand abscess, osteomyelitis, RUL abscess  - transitioned to oral doxycycline to continue through 4/28     Multiple wounds POA  - wound vac to sacral wound and left foot wound  - wound care     HFrEF  - EF 35%  - lasix 40 mg BID  - electrolyte replacement  - daily weights, I/O     CKD  - RODRCIK due to sepsis, cardiac arrest. Required dialysis during acute stay  - Cr stbale 1.6-2 range  - monitor     Atrial Fibrillation  - BB  - AC contraindicated due to bleeding risk    HTN  - started lisinopril  - adjust medications as needed     DM2 complicated by neuropathy  - lantus plus SSI  - glucose remains uncontrolled, increase lantus to 17 -> 20     Diabetic Neuropathy  - gabapentin      Acute blood loss anemia  - recent GIB s/p embolization of gastroduodenal artery on 2/25 and ex-lap  - monitor and transfuse for Hb<7     History of GIB  - PPI     Nausea  - PRN zofran and compazine  - started reglan  - if persistent, consider GI consult as patient has a complicated history     Urinary Retention  - flomax  - consider voiding trial soon - in coming days     Chronic Pain  - dilaudid 2 mg q6 hours, wean as able  - oxycodone PRN     Bowels: Schedule stool softener. Follow bowel movements. Enema or suppository if needed.      Bladder: Check PVR x 3.   [address] if PVR > 200ml or if any volume is > 500 ml.      Sleep: Trazodone provided prn.      Follow up appointments: PCP, Cardiology, wound care    Electronically signed by Sajan Kenney MD on 4/18/2022 at 5:35 PM

## 2022-04-19 NOTE — PROGRESS NOTES
Physical Therapy  Facility/Department: [facility]  Daily Treatment Note  NAME: Cheryl Sanchez  : 1975  MRN: [id number]    Date of Service: 2022    Discharge Recommendations:  Continue to assess pending progress,24 hour supervision or assist,Home with Home health PT   PT Equipment Recommendations  Other: TBD pending progress and increasead functional mobility assessment    Assessment    Body structures, Functions, Activity limitations: Decreased functional mobility ; Decreased strength;Decreased endurance;Decreased balance;Decreased sensation; Increased pain  Assessment: Patient seen as co-treat with OT to facilitate safe transfers and standing balance training to progress toward improving pt's functional mobility. Patient completed multiple stands in // bars this session, up to 2 mins, 10 sec with max A x2 and cues for posture. Pt demos significant improvement in activity tolerance; however, continues to require prolonged seated rest breaks for recovery. Will continue to progress strength and endurance to progress functional transfers. Treatment Diagnosis: impaired strength and activity tolerance  Prognosis: Good  Decision Making: High Complexity  PT Education: Goals;PT Role;Plan of Care;Transfer Training;General Safety; Functional Mobility Training;Disease Specific Education;Home Exercise Program;Precautions  Patient Education: pt educated on progress and need to continue to progress standing balance and strength to progress mobility - demos understanding  REQUIRES PT FOLLOW UP: Yes  Activity Tolerance  Activity Tolerance: Patient Tolerated treatment well;Patient limited by fatigue;Patient limited by endurance  Activity Tolerance: 119/60, 83 bpm; 121/66 after standing     Patient Diagnosis(es): There were no encounter diagnoses.      has a past medical history of Abscess of left foot, Abscess of left hand, Acute encephalopathy, Acute hypoxemic respiratory failure ([address] Acute osteomyelitis of left hand [facility]), Acute osteomyelitis of right hallux ([address] Cardiopulmonary arrest ([address] Cellulitis of left foot, CHF (congestive heart failure) ([address] Chronic osteomyelitis of left foot ([address] Closed displaced fracture of distal phalanx of right little finger, Clostridium difficile infection, Diabetic ulcer of left forefoot associated with type 2 diabetes mellitus, with necrosis of bone ([address] Diabetic ulcer of right great toe associated with type 2 diabetes mellitus, with necrosis of bone ([address] Enterococcal infection, 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 has a past surgical history that includes Tonsillectomy; [place] tooth extraction; Knee arthroscopy (Left, 08/15/2013); Toe amputation (Right, 08/23/2019); other surgical history (Left, 07/24/2020); Toe amputation (Left, 07/24/2020); Toe amputation (Left, 10/22/2020); Foot Debridement (Left, 02/01/2022); Arm Surgery (Left, 02/05/2022); IR NONTUNNELED VASCULAR CATHETER > 5 YEARS (02/11/2022); Upper gastrointestinal endoscopy (N/A, 02/17/2022); IR EMBOLIZATION HEMORRHAGE (Right, 02/25/2022); Upper gastrointestinal endoscopy (N/A, 2/27/2022); laparotomy (N/A, 2/27/2022); Foot Debridement (Left, 3/8/2022); Cholecystectomy; Dilatation, esophagus; and Endoscopy, colon, diagnostic.     Restrictions  Restrictions/Precautions  Restrictions/Precautions: General Precautions,Fall Risk  Position Activity Restriction  Other position/activity restrictions: ROMARIO PICC, wound vacs to L foot and sacrum, rea  Subjective   General  Chart Reviewed: Yes  Additional Pertinent Hx: PMHx: CHF, HTN, B hallux amputations (2019 and 2020). Per H&P: \"Pt with prolonged hospital stay secondary to COVID including intubation for acute cardiac arrest, extubated 2/9/22, GI bleed requiring multiple blood transfusion and surgery. 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 On 2/13/22 respiratory cx with Candida albicans He had an MSSA bacteremia from his foot abscess and is s/p debridement 3/8/22 with placement of would vac. \"  Response To Previous Treatment: Patient with no complaints from previous session. Family / Caregiver Present: No  Referring Practitioner: Sivan Little MD  Subjective  Subjective: pt in bed, agreeable to therapy  Pain Screening  Patient Currently in Pain: Denies  Vital Signs  Patient Currently in Pain: Denies       Orientation  Orientation  Overall Orientation Status: Within Functional Limits     Objective   Bed mobility  Supine to Sit: Moderate assistance (HOB elevated, assist at trunk)  Transfers  Sit to Stand: Maximum Assistance;Dependent/Total;2 Person Assistance  Stand to sit: Maximum Assistance;Dependent/Total;2 Person Assistance  Bed to Chair: Dependent/Total  Comment: Pt completes 1x STS from bed with Rosa lopez, 1x from paddles of herb lopez to transfer to w/c. Pt then completes 6 total stands in // bars 1) 35 seconds with pt tapping each hand forward/backward on bars to target 1x), 2) 50 seconds with pt tapping each hand forwrd/backward on bars to target 3x, 3) briefly stands, 4) 15 sec stand, 5) 1 min while naming animals, 6) 2:10 mins. All stands completed with max A x2 and BUE support. Pt requires cues and assist for dax knee block, trunk, and hip and knee extension.   Ambulation  Ambulation?: No        Second Session: (with Kevin Ji, PT, DPT)  Subjective: pt supine in bed upon arrival, agreeable to PT tx.   - Pain: Pt denies pain during this session    Objective:   -Bed Mobility: Pt perform rolling to L with use of bedrail, min(A) for completion of roll in order to perform hygiene and while RN assesses wound vac location. Pt perform rolling to L and R x2 each direction to doff pants per pt request, don clean brief and place lift sling    -Transfers: Young-lift transfer from bed to chair, pt able to direct care this session. Once in chair, pt requires max(A)x2 to reposition in chair. Assessment: Pt seen for second PT session with focus on bed mobility to improve independence with task and transfer to improve upright tolerance. Pt reports lightheadedness upon transfer to chair, /63, HR 76 bpm, SpO2 96% on RA. Pt reports improvement in symptoms with time. At end of session, pt seated in chair with call light and all need within reach. Pt functioning below baseline and would benefit from skilled therapy to address current deficits mentioned above.      Plan: Continue PT POC as indicated    Goals  Short term goals  Time Frame for Short term goals: 10 days- 4/22/22  Short term goal 1: Pt will complete bed mobility with min A  Short term goal 2: Pt will complete functional transfers with max A x 1 using LRAD  Short term goal 3: Pt will propel manual w/c 50ft with min A  Short term goal 4: Pt will tolerate gait assessment and appropriate LTG to be set  Long term goals  Time Frame for Long term goals : 3 weeks- 5/4/22  Long term goal 1: Pt will complete bed mobility with supervision  Long term goal 2: Pt will complete functional transfers with min A using LRAD  Long term goal 3: Pt will propel manual w/c 150ft with mod I  Patient Goals   Patient goals : \"to walk again\"    Plan    Plan  Times per week: 5 out of 7 days/week  Plan weeks: 3 weeks  Current Treatment Recommendations: Strengthening,Balance Training,Functional Mobility Training,Transfer Training,Gait Training,Stair training,Wheelchair Mobility  Exercise Program,Safety Education & Training,Patient/Caregiver Education & Training,Equipment Evaluation, Education, & procurement  Safety Devices  Type of devices:  All fall risk precautions in place,Call light within reach,Nurse notified,Patient at risk for falls,Gait belt,Bed alarm in place,Left in bed  Restraints  Initially in place: No     Therapy Time   Individual Concurrent Group Co-treatment   Time In       0900   Time Out       1000   Minutes       60   Timed Code Treatment Minutes: 60 Minutes     Second Session Therapy Time: (With Fabian Valente PT, DPT)   Individual Concurrent Group Co-treatment   Time In 1100        Time Out 1130        Minutes 30        Timed Code Treatment Minutes:   30 Minute    Total Treatment Minutes:  [address] [facility], PT     Fabian Valente PT, DPT

## 2022-04-19 NOTE — PROGRESS NOTES
Kylah Gaston  4/19/2022  [id number]    Chief Complaint: Critical illness myopathy    Subjective:   No overnight events. No current complaints. Labs reviewed. Glucoses above goal. States he was taking Lantus 75, prandial 15, plus SSI at home. Reports he is not eating like he did prior and has lost a lot of weight due to his illness. WVs changed yesterday. ROS: denies f/c, sob, cp    Objective:  Patient Vitals for the past 24 hrs:   BP Temp Temp src Pulse Resp SpO2   04/19/22 0845 119/60 96.1 °F (35.6 °C) Oral 85 20 96 %   04/19/22 0739 121/72 97 °F (36.1 °C) Oral 85  95 %   04/18/22 1955 (!) 145/83 99 °F (37.2 °C) Oral 100 18 94 %     Gen: No distress, pleasant. Resting in bed   HEENT: Normocephalic, atraumatic. CV: Regular rate and rhythm. Resp: No respiratory distress. Abd: Soft, nontender  Ext: No edema. Skin: WV to sacrum and L foot with appropriate seal and minimal output  Neuro: Alert, oriented, appropriately interactive.      Wt Readings from Last 3 Encounters:   04/13/22 236 lb 1.8 oz (107.1 kg)   03/10/22 255 lb 1.6 oz (115.7 kg)   10/14/21 282 lb (127.9 kg)       Laboratory data:   Lab Results   Component Value Date    WBC 7.9 04/19/2022    HGB 9.0 (L) 04/19/2022    HCT 25.7 (L) 04/19/2022    MCV 92.5 04/19/2022     04/19/2022       Lab Results   Component Value Date     04/18/2022    K 4.0 04/18/2022     04/18/2022    CO2 28 04/18/2022    BUN 20 04/18/2022    CREATININE 1.2 04/18/2022    GLUCOSE 216 04/18/2022    CALCIUM 8.8 04/18/2022        Therapy progress:  PT  Position Activity Restriction  Other position/activity restrictions: RUE PICC, wound vacs to L foot and sacrum, rea  Objective     Sit to Stand: Maximum Assistance,Dependent/Total,2 Person Assistance  Stand to sit: Maximum Assistance,Dependent/Total,2 Person Assistance  Bed to Chair: Dependent/Total     OT  PT Equipment Recommendations  Other: TBD pending progress and increasead functional mobility assessment     Assessment        SLP  Current Diet : Regular  Current Liquid Diet : Thin  Diet Solids Recommendation: Regular  Liquid Consistency Recommendation: Thin    Body mass index is 32.02 kg/m². Assessment and Plan:  Erum Barone is a 42year old male with a past medical history significant for DM2, diabetic foot ulcer with multiple amputations, HFrEF who has had a prolonged hospital course following Covid pneumonia with complications including respiratory failure, GIB, and multiple infections.  He was admitted to [facility] on 4/13/22 due to functional deficits below his baseline.      Critical Illness Myopathy  - due to covid pneumonia  - PT, OT, ST     MSSA bactermia  - with left foot abscess, left hand abscess, osteomyelitis, RUL abscess  - transitioned to oral doxycycline to continue through 4/28     Multiple wounds POA  - wound vac to sacral wound and left foot wound  - wound care     HFrEF  - EF 35%  - lasix 40 mg BID  - electrolyte replacement  - daily weights, I/O     CKD  - RODRICK due to sepsis, cardiac arrest. Required dialysis during acute stay  - Cr stbale 1.6-2 range  - monitor     Atrial Fibrillation  - BB  - AC contraindicated due to bleeding risk    HTN  - started lisinopril  - adjust medications as needed     DM2 complicated by neuropathy  - lantus plus SSI  - glucose remains uncontrolled, increase lantus to 20 -> 25, add prandial 4U, SSI  - home regimen lantus 75, prandial 15, SSI     Diabetic Neuropathy  - gabapentin      Acute blood loss anemia  - recent GIB s/p embolization of gastroduodenal artery on 2/25 and ex-lap  - monitor and transfuse for Hb<7     History of GIB  - PPI     Nausea  - PRN zofran and compazine  - started reglan  - if persistent, consider GI consult as patient has a complicated history  - improving     Urinary Retention  - flomax  - consider voiding trial soon - in coming days     Chronic Pain  - dilaudid 2 mg q6 hours, wean as able  - oxycodone PRN     Bowel: Per protocol  Bladder: Per protocol  Sleep: Trazodone PRN  Pain: neurontin 400, dilaudid PO 2 Q6H scheduled, tizanidine 8 BID.  Tylenol and cy 5-10 PRN  DVT PPx: heparin  JARRETT: TBD     Follow up appointments: PCP, Cardiology, wound care    Electronically signed by Nicholas Kussmaul, MD on 4/19/2022 at 6:36 PM

## 2022-04-19 NOTE — PATIENT CARE CONFERENCE
[facility]  Inpatient Rehabilitation  Weekly Team Conference Note    Date: 2022  Patient Name: Jayme Wan        MRN: [id number]    : 1975  (42 y.o.)  Gender: male   Referring Practitioner: Echo Villavicencio MD  Diagnosis: sepsis      Interventions to be utilized toward barriers to discharge, per discipline:  [address] observed barriers to dc: Decreased endurance  Nursing interventions:Assist with ADL's, toileting and medication management  Family Education: no  Fall Risk:  Yes      Physical therapy observed barriers to dc:    Baseline: independent, works full time    Current level: mod A for supine>sit, max A x2 for transfers in // bars vs. [place] stedy   Barriers to DC: MARIE, strength, endurance, 1-2 DANIE   Needs in order to achieve dc home/next level of care: min A or better for transfers, CTA for DME      Physical therapy interventions:   Current Treatment Recommendations: Strengthening,Balance Training,Functional Mobility Training,Transfer Training,Gait Training,Stair training,Wheelchair Mobility [facility] Exercise Program,Safety Education & Training,Patient/Caregiver Education & Training,Equipment Evaluation, Education, & procurement      PHYSICAL THERAPY  PT Equipment Recommendations  Other: TBD pending progress and increasead functional mobility assessment    Assessment: Patient seen as co-treat with OT to optimize mobility d/t level of assist for transfers. Pt required assistance for pericare in bed d/t pt with BM at start of session. Pt completed rolling with supervision toward R and with min A toward L with use of bedrail. Pt completed STS with use of herb stedy to don pants and transfer to w/c with max A X2. Pt completes 3 stands lasting ~20 seconds in // bars with BUE support and dax knee block with assist for lift and balance. Pt requires cues for posture and technique, fatigues early.  Pt will benefit from continued skilled PT in ARU to progress strength and endurance. Occupational therapy observed barriers to dc:    Baseline: Lives with spouse, Independent with ADLs/IADLs and mobility, driving   Current level: Dependent for toileting, CGA to setup for UB ADLs, mod A to total assist  for bed mobility, max Ax2 for transfers with use of Maylin Sands   Barriers to DC: generalized weakness, wound recovery, decreased activity tolerance   Needs in order to achieve dc home/next level of care: carryover of compensation  Techniques, min A for functional transfers, CTA for DME needs    Occupational Therapy interventions:  Current Treatment Recommendations: Strengthening,ROM,Endurance Training,Balance Cehryl Bolton training,Pain Management,Patient/Caregiver Education & Training,Self-Care / ADL,Positioning,Functional Mobility Training,Home Management Training,Safety Education & Training,Wheelchair Mobility Training,Equipment Evaluation, Education, & procurement      OCCUPATIONAL THERAPY   Pt requires mod A for bed mobility and setup for feeding at EOB. Pt completes UB dressing with SBA and LB dressing with mod A with use of AD. Pt continues to require max x2 for STS however demonstrated improved activity tolerance standing for 50 seconds, 60 seconds, and 2 mins with therapeutic rest breaks between reps. Faciliation provided for BUE placement and posture with patient able to self correct with repititon. Pt continues to be below baseline for functional ADLs and requires continued skilled occupation therapy services. Cont POC. Speech therapy observed barriers to dc:         SPEECH THERAPY (intentionally left blank if not actively being seen by this service):       NUTRITION  Weight: 236 lb 1.8 oz (107.1 kg) / Body mass index is 32.02 kg/m². Diet Order: ADULT DIET;  Regular; 4 carb choices (60 gm/meal)  ADULT ORAL NUTRITION SUPPLEMENT; Breakfast, Dinner; Diabetic Oral Supplement  PO Meals Eaten (%): 1 - 25%  Education: Declined       CASE MANAGEMENT  Assessment: 42 yr old male. DX:Critical illness myopathy. Independent PLOF. Lives with spouse. Therapy recommendations are 24 hour supervision or assist,Home with Home health PT/OT. DME:TBD. CM will continue to support for discharge needs. Interdisciplinary Goals:   1.)Pt will complete functional transfers with min A using LRAD  2.)Pt will complete toileting with min A  3.) Patient will tolerate all meals out of bed  4.)  5.)    Discharge Plan   Estimated discharge date: 4/29  Destination: Home w/ 24H assist  Pass:No  Services at Discharge: Home health w/ PT/OT/RN and wound care  Equipment at Discharge: Tub transfer bench, wheel chair, remaining TBD    Team Members Present at Conference:  : Charisma Marshall    Occupational Therapist: Raya May OTR/L  Physical Therapist: Diya Russell PT  Speech Therapist: N/A  Nurse: Desirae Walter RN BSN  Dietician: Yojana Ames RDN, LD  : Marek Vallejo OTR/L  Psychiatry: N/A    Family members present at conference: No      I led this team conference and I approve the established interdisciplinary plan of care as documented within the medical record of [facility].     MD:Electronically signed by Kyle Krishna MD on 4/20/2022 at 4:37 PM

## 2022-04-19 NOTE — PROGRESS NOTES
Occupational Therapy  Facility/Department: [facility]  Daily Treatment Note  NAME: Miriam Stockton  : 1975  MRN: [id number]    Date of Service: 2022    Discharge Recommendations:  Continue to assess pending progress,Patient would benefit from continued therapy after discharge       Assessment    Performance deficits/ Impairments: Decreased functional mobility ; Decreased ADL status; Decreased endurance; Decreased strength; Decreased sensation; Decreased ROM; Decreased balance; Decreased high-level IADLs; Decreased coordination; Decreased posture  Assessment: First session: Pt tolerated treatment well. Pt requires mod A for bed mobility and setup for feeding at EOB. Pt completes UB dressing with SBA and LB dressing with mod A with use of AD. Pt returned to bed at end of session. Second session: Cotx completed with PT to maximize safety during transfer training. Pt continues to require max x2 for STS however demonstrated improved activity tolerance standing for 50 seconds, 60 seconds, and 2 mins with therapeutic rest breaks between reps. Faciliation provided for BUE placement and posture with patient able to self correct with repititon. Pt continues to be below baseline for functional ADLs and requires continued skilled occupation therapy services. Cont POC. OT Education: OT Role;Plan of Care;Precautions;Transfer Training;Energy Conservation; ADL Adaptive Strategies; Home Exercise Program  Patient Education: bed mobility, sitting/standing balance, safety with transfers using Cloretta Oakland and in parallel bars, upright posture during standing, w/c mobility, RUE strengthening exercises, LUE ROM exercises. Barriers to Learning: none  Activity Tolerance  Activity Tolerance: Patient Tolerated treatment well;Patient limited by fatigue;Patient limited by pain  Activity Tolerance: Vitals: 121/66, HR 83, O2 97% on RA  Safety Devices  Safety Devices in place: Yes  Type of devices: Nurse notified; Chair alarm in laparotomy (N/A, 2/27/2022); Foot Debridement (Left, 3/8/2022); Cholecystectomy; Dilatation, esophagus; and Endoscopy, colon, diagnostic. Restrictions  Restrictions/Precautions  Restrictions/Precautions: General Precautions,Fall Risk  Position Activity Restriction  Other position/activity restrictions: RUE PICC, wound vacs to L foot and sacrum, rea  Subjective   General  Chart Reviewed: Yes,Progress Notes,History and Physical,Labs,Previous Admission  Patient assessed for rehabilitation services?: Yes  Additional Pertinent Hx: PMHx: CHF, HTN, B hallux amputations (2019 and 2020). Per H&P: \"Pt with prolonged hospital stay secondary to COVID including intubation for acute cardiac arrest, extubated 2/9/22, GI bleed requiring multiple blood transfusion and surgery. 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 On 2/13/22 respiratory cx with Candida albicans He had an MSSA bacteremia from his foot abscess and is s/p debridement 3/8/22 with placement of would vac. \"  Family / Caregiver Present: No  Referring Practitioner: Lorraine Bradshaw MD  Diagnosis: COVID pneumonia; respiratory failure requiring intubation; GIB; multiple infections with MSSA bacteremia with L foot abscess, L hand abscess, OM, RUL abscess; multiple wounds with wounds vac to sacrum and L foot. Subjective  Subjective: Pt presented supine in bed, pleasant, and agreeable to OT session. General Comment  Comments: RN cleared patient for therapy  Pain Assessment  Pain Assessment: 0-10  Pain Level: 5  Pain Type: Acute pain  Pain Location: Sacrum; Buttocks  Pain Orientation: Right;Left;Posterior  Pain Descriptors: Aching  Pain Frequency: Intermittent  Non-Pharmaceutical Pain Intervention(s): Distraction; Ambulation/Increased Activity; Emotional support;Repositioned  Response to Pain Intervention: Patient Satisfied  Pre Treatment Pain Screening  Intervention List: Patient able to continue with treatment  Vital Signs  Temp: 97 °F (36.1 °C)  Temp Source: Oral  Pulse: 85  Heart Rate Source: Monitor  BP: 121/72  BP Location: Left upper arm  Patient Position: Semi fowlers  Patient Currently in Pain: Yes  Oxygen Therapy  SpO2: 95 %  Pulse Oximeter Device Mode: Intermittent  Pulse Oximeter Device Location: Finger;Left  O2 Device: None (Room air)   Orientation  Orientation  Overall Orientation Status: Within Functional Limits  Objective    ADL  Feeding: Setup (Provided assist for container management)  Grooming: Increased time to complete;Stand by assistance;Setup (washing face and brushing hair while seated at EOB)  UE Dressing: Stand by assistance (Pullover shirt sitting at EOB)  LE Dressing: Dependent/Total (brief and shorts. Assistance required for wound vac and catheter line managment. 2 person assist for clothing management while standing in stedy)  Toileting:  (assist for all parts at bed level)  Additional Comments: Pt declining further ADLs at this time      Transfers  Sit to stand: Maximum assistance;2 Person assistance  Stand to sit: Maximum assistance;2 Person assistance  Transfer Comments: Max A x2 people  to/from elevated EOB x1 rep and w/c x1 rep using Sophronia Satchel.  x3 STS in stedy at // bars        Cognition  Overall Cognitive Status: [address]  Times per week: 5-7x per week  Current Treatment Recommendations: Jud Kearney training,Pain Management,Patient/Caregiver Education & Training,Self-Care / ADL,Positioning,Functional Mobility Training,Home Management Training,Safety Education & Training,Wheelchair Mobility Training,Equipment Evaluation, Education, & procurement    Goals  Short term goals  Time Frame for Short term goals: Pt will complete UB dressing with min A  Short term goal 1: Pt will complete LB dressing with mod A  Short term goal 2: Pt will complete LB bathing with mod A  Short term goal 3: Pt will complete UB bathing with SBA  Short term goal 4: Pt will complete 3 grooming task at w/c level  Short term goal 5: Pt will complete x20 reps of BUE HEP to improve UB strength/endurance for repositioning and UB ADLs  Long term goals  Time Frame for Long term goals : 3 weeks (4/05)  Long term goal 1: Pt will complete total body dressing with supv  Long term goal 2: Pt will complete total body bathing with supv  Long term goal 3: Pt will complete 3 grooming tasks at sink with mod I  Long term goal 4: Pt will perform functional transfers with mod I  Patient Goals   Patient goals : 1 week (4/21)       Therapy Time   Individual Concurrent Group Co-treatment   Time In 0730     0900   Time Out 0800     1000   Minutes 30     60         Timed Code Treatment Minutes:  90 Minutes    Total Treatment Minutes:  90 minutes      Diane Roland, OT

## 2022-04-20 NOTE — PROGRESS NOTES
[facility] Nurse  Follow-up Progress Note       NAME:  Carol Bhandari  MEDICAL RECORD NUMBER:  [id number]  AGE:  42 y.o. GENDER:  male  :  1975  TODAY'S DATE:  2022    Subjective:Hi, I'm good, waiting on pain medication for back side. Wound Identification:  Wound Type: pressure and traumatic  Contributing Factors:  edema, diabetes, chronic pressure, decreased mobility, shear force, obesity, decreased tissue oxygenation, incontinence of stool and incontinence of urine        Patient Goal of Care:  [x] Wound Healing  [] Odor Control  [] Palliative Care  [x] Pain Control   [] Other:     Objective: Munoz attached with yellow urine in container. IV capped to right brachial. Dressing loose distal end. Left foot dressing secure. Serosanguinous drainage noted in container. BP (!) 150/80   Pulse 87   Temp 98.5 °F (36.9 °C) (Oral)   Resp 20   Ht 6' (1.829 m)   Wt 236 lb 1.8 oz (107.1 kg)   SpO2 95%   BMI 32.02 kg/m²   Shimon Risk Score: Shimon Scale Score: 15  Assessment:Left foot red with granulation present. Edges moist, macerated. Sacrum red with small of amount of bloody seepage with dressing change. Small amount of slough present to inner anterior bed of wound. Measurements smaller. Measurements:  Negative Pressure Wound Therapy Foot Left;Dorsal (Active)   $ Standard NPWT >50 sq cm PER TX $ Yes 22 1108   Wound Type Pressure ulcer: Stage IV;Surgical 22 1052   Unit Type [facility] [id number] 22 1052   Dressing Type Black Foam 22 1052   Number of pieces used 3 22 1641   Cycle Continuous 22 1052   Target Pressure (mmHg) 125 22 1052   Intensity 1 22 1052   Canister changed?  No 22 1052   Dressing Status New dressing applied 22 1052   Dressing Changed Changed/New 22 1052   Drainage Amount Small 22 1052   Drainage Description Serosanguinous 22 1052   Dressing Change Due 04/22/22 04/20/22 1052   Wound Assessment Epithelialization;Granulation tissue 04/20/22 1052   Shape oval foot and heart chaped coccyx 04/20/22 1052   Odor None 04/20/22 1052   Number of days: 76       Wound 01/24/22 Hand Left;Dorsal (Active)   Wound Image   04/14/22 1108   Wound Etiology Burn 04/19/22 2045   Dressing Status Other (Comment) 04/15/22 0911   Wound Cleansed Not Cleansed 04/19/22 0856   Dressing/Treatment Open to air 04/19/22 2045   Distance Tunneling (cm) 0 cm 04/14/22 1108   Tunneling Position ___ O'Clock 0 04/14/22 1108   Undermining Starts ___ O'Clock 0 04/14/22 1108   Undermining Ends___ O'Clock 0 04/14/22 1108   Undermining Maxium Distance (cm) 0 04/14/22 1108   Wound Assessment Dry;Pink/red;Epithelialization 04/19/22 2045   Drainage Amount None 04/14/22 1108   Odor None 04/14/22 1108   Shanita-wound Assessment Dry/flaky; Intact 04/14/22 1108   Margins Attached edges; Defined edges 04/14/22 1108   Number of days: 86       Wound 01/24/22 Foot Left;Dorsal I & D to left dorsal foot by Dr. Meghana Barnes, surgical wound (Active)   Number of days: 86       Wound 01/30/22 Sacrum SDTI (Active)   Wound Image   04/20/22 1052   Wound Etiology Pressure Stage  4 04/20/22 1052   Dressing Status New dressing applied 04/20/22 1052   Wound Cleansed Cleansed with saline 04/20/22 1052   Dressing/Treatment Barrier film;Negative pressure wound therapy 04/20/22 1052   Offloading for Diabetic Foot Ulcers Offloading ordered 04/20/22 1052   Dressing Change Due 04/22/22 04/20/22 1052   Wound Length (cm) 11.5 cm 04/20/22 1052   Wound Width (cm) 7.5 cm 04/20/22 1052   Wound Depth (cm) 1.4 cm 04/20/22 1052   Wound Surface Area (cm^2) 86.25 cm^2 04/20/22 1052   Change in Wound Size % (l*w) -331.25 04/20/22 1052   Wound Volume (cm^3) 120.75 cm^3 04/20/22 1052   Wound Healing % -788 04/20/22 1052   Distance Tunneling (cm) 0 cm 04/14/22 1108   Tunneling Position ___ O'Clock 0 04/20/22 1052   Undermining Starts ___ O'Clock 0 04/20/22 1052   Undermining Ends___ O'Clock 0 04/20/22 1052   Undermining Maxium Distance (cm) 0 04/20/22 1052   Wound Assessment Granulation tissue;Pale granulation tissue;Slough 04/20/22 1052   Drainage Amount Small 04/20/22 1052   Drainage Description Serosanguinous 04/20/22 1052   Odor None 04/20/22 1052   Shanita-wound Assessment Dry/flaky; Intact 04/20/22 1052   Margins Attached edges; Defined edges 04/20/22 1052   Wound Thickness Description not for Pressure Injury Full thickness 04/20/22 1052   Number of days: 80  Sacrum   Sa      Incision 03/08/22 Foot Anterior; Left (Active)   Wound Image   04/20/22 1052   Dressing Status New dressing applied 04/20/22 1052   Dressing Change Due 04/22/22 04/20/22 1052   Incision Cleansed Cleansed with saline 04/20/22 1052   Dressing/Treatment Barrier film;Negative pressure wound therapy 04/20/22 1052   Incision Length (cm) 2 04/20/22 1052   Incision Width (cm) 5 cm 04/20/22 1052   Incision Depth (cm) 0.2 cm 04/20/22 1052   Margins Approximated 04/20/22 1052   Incision Assessment Epithelialization 04/20/22 1052   Drainage Amount Scant 04/20/22 1052   Drainage Description Serosanguinous 04/20/22 1052   Odor None 04/20/22 1052   Shanita-incision Assessment Maceration 04/20/22 1052   Number of days: 42   Left foot        3 black foam removed from sacrum and 1 black foam from left foot. 2 pieces of black foam applied to sacrum wound. 1 piece black to anterior foot. Response to treatment:  Well tolerated by patient. Pain Assessment:  Severity:  6 / 10  Quality of pain: aching  Wound Pain Timing/Severity: intermittent  Premedicated: No  PRN medication brought in after dressing change. Plan:   Plan of Care: Wound 01/30/22 Sacrum SDTI-Dressing/Treatment: Leslye Mode film,Negative pressure wound therapy  [REMOVED] Wound 02/14/22 Head Left; Lower; Posterior Friction/pressure wound-unstageable.  4/14/22 healed now, will complete LDA per CWOCN-Dressing/Treatment: Open to air  Wound 01/24/22

## 2022-04-20 NOTE — PROGRESS NOTES
Occupational Therapy  Facility/Department: [facility]  Daily Treatment Note  NAME: Lucia Moe  : 1975  MRN: [id number]    Date of Service: 2022    Discharge Recommendations:  Continue to assess pending progress,Patient would benefit from continued therapy after discharge  OT Equipment Recommendations  Equipment Needed: Yes  Mobility Devices: ADL Assistive Devices  ADL Assistive Devices: Grab Bars - shower;Transfer Tub Bench  Other: CTA pending progress      Patient Diagnosis(es): There were no encounter diagnoses. Assessment    Assessment: First session: Pt incontinent of BMs this date requiring max assist for toileting while supine in bed. Pt able to tolerate transfer to standard toilet with max x2 and use of Fidel Levans. Pt endorsed s/s of dizziness during STS resulting in return to bed after toileting. Second session: Completed UB sponge bath at EOB with pt requiring CGA/SBA for sitting balance and UB dressing. Scapular mobilization completed on L shoulder for upward rotation and elevation d/t pt reporting \"tightness\" when completing ADLs. Pt reporting relief after mobilization. Engaged pt in BUE exercises at bed level. Pt left supine in bed with all immediate needs met. Cont POC. Discharge Recommendations: Continue to assess pending progress; Patient would benefit from continued therapy after discharge  Equipment Needed: Yes  Mobility Devices: ADL Assistive Devices  Other: CTA pending progress      Plan   Plan  Times per Week: 5-7x per week  Current Treatment Recommendations: Strengthening;ROM;Balance training;Functional mobility training; Endurance training;Pain management; Safety education & training;Patient/Caregiver education & training;Positioning;Self-Care / ADL;Neuromuscular re-education; Wheelchair mobility training;Equipment evaluation, education, & procurement     Subjective   Subjective  Subjective: Pt presented supine in bed and incontinent of BM.  Assist provided to patient for toileting throughout session. Pain: Pt reporting 5/10 pain in buttocks and arms  Cognition  Overall Cognitive Status: WFL        Objective    Vitals: 117/75, HR 87, Spo2 98% on RA  Bed Mobility Training  Bed Mobility Training: Yes  Rolling: Stand-by assistance  Supine to Sit: Moderate assistance (HOB slightly elevated)  Sit to Supine: Moderate assistance;Assist X2 (mod A x2)  Scooting: Total assistance (total A x2 toward [facility] to [facility])  Transfer Training  Transfer Training: Yes  Sit to Stand: Assist X2;Maximum assistance; Total assistance (max A x2 STS with herb stedy from bed and toilet)  Stand to Sit: Maximum assistance; Total assistance;Assist X2 (max A x2 STS with herb stedy from bed and toilet)  Bed to Chair: Total assistance (herb stedy)     ADL  Grooming: Increased time to complete;Stand by assistance;Setup (washing face at EOB)  UE Bathing: Increased time to complete;Stand by assistance  LE Bathing: Moderate assistance; Increased time to complete (Pt able to to reach B upper legs however requires assist for lower legs and buttocks)  UE Dressing: Stand by assistance  LE Dressing: Dependent/Total (depends)  Toileting: Dependent/Total (incontinent of BM)  Additional Comments: Pt declining further ADLs at this time  OT Exercises  A/AROM Exercises: x20 of the following exercises: shoulder flexion, elbow flexion/ext, wrist flexion/ext, finger flexion/ext.  (Pt tolerated well with rest breaks between exercises)        Patient Education  Education Given To: Patient  Education Provided: Role of Therapy;Plan of Care;Energy Conservation;Transfer Training;Precautions    Goals  Short Term Goals  Time Frame for Short term goals: Pt will complete UB dressing with min A- GOAL MET; Pt SBA for UB dressing at EOB  Short Term Goal 1: Pt will complete LB dressing with mod A  Short Term Goal 2: Pt will complete LB bathing with mod A  Short Term Goal 3: Pt will complete UB bathing with SBA  Short Term Goal 4: Pt will complete 3 grooming task at w/c level  Short Term Goal 5: Pt will complete x20 reps of BUE HEP to improve UB strength/endurance for repositioning and UB ADLs- GOAL MET 4/20;  Pt completing x20 BUE AROM  Long Term Goals  Time Frame for Long term goals : 3 weeks (4/05)  Long Term Goal 1: Pt will complete total body dressing with supv  Long Term Goal 2: Pt will complete total body bathing with supv  Long Term Goal 3: Pt will complete 3 grooming tasks at sink with mod I  Long Term Goal 4: Pt will perform functional transfers with mod I  Patient Goals   Patient goals : 1 week (4/21)       Therapy Time   Individual Concurrent Group Co-treatment   Time In 1000     0900   Time Out 1030     1000   Minutes 30     60   Timed Code Treatment Minutes: 22 S Celia Abarca, OT

## 2022-04-20 NOTE — PLAN OF CARE
Problem: Pain:  Goal: Control of acute pain  Description: Control of acute pain  Outcome: Progressing

## 2022-04-20 NOTE — CARE COORDINATION
ARU Team Conference       Planned Discharge Date:04/29/22   Durable medical equipment needed:TBD    Discharge Plan: CM spoke with patient and wife at bedside with updated DCP. CM explained therapy recommendations continue to assess pending progress,24 hour supervision or assist,Home with Home health PT/OT at this time. DME:TBD. Patient and wife has no concerns at this time and agrees so far with DCP.  Colletta Daub, RN

## 2022-04-20 NOTE — PROGRESS NOTES
Comprehensive Nutrition Assessment    Type and Reason for Visit:  Reassess    Nutrition Recommendations/Plan:   1. Continue carb control diet   2. Increase Glucerna to TID - update flavor preference to chocolate   3. Encourage PO intakes as tolerated   4. Consider stopping scheduled bowel regimen and adding probiotic and psyllium fiber to bulk stool - MD to advise   5. Monitor nutrition adequacy, pertinent labs, bowel habits, wt changes, and clinical progress     Malnutrition Assessment:  Malnutrition Status: Moderate malnutrition (04/14/22 1528)    Context:  Chronic Illness     Findings of the 6 clinical characteristics of malnutrition:  Energy Intake:  7 - 75% or less estimated energy requirements for 1 month or longer  Weight Loss:  7 - 7.5% over 3 months       Nutrition Assessment:    Follow up: Pt nutritionally improving AEB PO intakes of mainly %. Pt reports fair appetite, but trying to eat most of his meals for healing. Favorable to current ONS, will add additional to promote wound healing. Noted pt having diarrhea, on scheduled bowel regimen. Will continue to monitor. Nutrition Related Findings:    +Diarrhea, on scheduled bowel regimen. BM x1 on 4/20. Labs reviewed. -245 past 24 hrs. Wound Type: Stage IV,Pressure Injury,Surgical Incision,Burns,Wound Vac       Current Nutrition Intake & Therapies:    Average Meal Intake: 1-25%,51-75%,%  Average Supplements Intake: %  ADULT DIET; Regular; 4 carb choices (60 gm/meal)  ADULT ORAL NUTRITION SUPPLEMENT; Breakfast, Dinner; Diabetic Oral Supplement    Anthropometric Measures:  Height: 6' (182.9 cm)  Ideal Body Weight (IBW): 178 lbs (81 kg)       Current Body Weight: 236 lb 1.8 oz (107.1 kg),   IBW. Weight Source: Bed Scale  Current BMI (kg/m2): 32                          BMI Categories: Obese Class 1 (BMI 30.0-34. 9)    Estimated Daily Nutrient Needs:  Energy Requirements Based On: Kcal/kg  Weight Used for Energy Requirements: Ideal (80.9 kg)  Energy (kcal/day): 5505-2107  Weight Used for Protein Requirements: Ideal  Protein (g/day): 80-97  Method Used for Fluid Requirements: 1 ml/kcal    Nutrition Diagnosis:   · Moderate malnutrition related to inadequate protein-energy intake,pain as evidenced by Criteria as identified in malnutrition assessment      Nutrition Interventions:   Food and/or Nutrient Delivery: Continue Current Diet,Modify Oral Nutrition Supplement  Nutrition Education/Counseling: Education declined  Coordination of Nutrition Care: Continue to monitor while inpatient,Interdisciplinary Rounds  Plan of Care discussed with: Patient    Goals:  Previous Goal Met: Progressing toward Goal(s)  Goals: Meet at least 75% of estimated needs,prior to discharge       Nutrition Monitoring and Evaluation:   Behavioral-Environmental Outcomes: None Identified  Food/Nutrient Intake Outcomes: Food and Nutrient Intake,Supplement Intake  Physical Signs/Symptoms Outcomes: Biochemical Data,Nutrition Focused Physical Findings,Weight,Skin,Diarrhea    Discharge Planning:    Continue current diet,Continue Oral Nutrition Supplement     Harvinder Shaw MS, [address],   Contact: [phone]

## 2022-04-20 NOTE — PROGRESS NOTES
Physical Therapy  [facility]  Daily Treatment Note  NAME: Angi Anthony  : 1975  MRN: [id number]    Date of Service: 2022    Discharge Recommendations:  Continue to assess pending progress,24 hour supervision or assist,Home with Home health PT   PT Equipment Recommendations  Other: TBD pending progress and increasead functional mobility assessment; likely w/c    Patient Diagnosis(es): There were no encounter diagnoses. Assessment   Assessment: Patient seen as co-treat with OT to facilitate safe transfers and optimize mobility progression d/t MARIE and pt complexity. Pt reporting nausea and incontinent of BM in soiled brief upon arrival and continuing to have urge for BM. Pt completed rolling and required assist for pericare and changing briefs in bed. Pt continues to state need for BM, so pt transferred to toilet. Pt requires max A x2 with herb stedy for transfers. Pt stood ~1 min in [facility] stedy for pericare from toilet and reports feeling he may pass out. Pt returned to bed and reports improvement. Patient completed additional rolling upon return to bed d/t continued BM after toileting. Pt will continue to benefit from skilled PT in ARU to progress functional mobility, strength, and endurance to return to home. Other: TBD pending progress and increasead functional mobility assessment; likely w/c      Plan    Plan  Plan weeks: 3 weeks  Current Treatment Recommendations: Strengthening;Balance training;Functional mobility training;Transfer training; Endurance training;Gait training;Neuromuscular re-education;Home exercise program;Safety education & training;Patient/Caregiver education & training     Subjective    Subjective  Subjective: pt in bed, reporting nausea and having a BM     Objective   Vitals: 98%, 87 bpm, 117/75     Bed Mobility Training  Bed Mobility Training: Yes  Rolling: Stand-by assistance  Supine to Sit: Moderate assistance (HOB slightly elevated)  Sit to Supine:

## 2022-04-21 NOTE — PROGRESS NOTES
Han Treadwell  4/21/2022  [id number]    Chief Complaint: Critical illness myopathy    Subjective:   No overnight events. Glucoses improved today. Requesting to have rea catheter removed. ROS: denies f/c, sob, cp    Objective:  Patient Vitals for the past 24 hrs:   BP Temp Temp src Pulse Resp SpO2   04/21/22 0815 113/67 98 °F (36.7 °C) Oral 102 20 98 %   04/20/22 2115 (!) 150/75 97.8 °F (36.6 °C) Oral 100 18 98 %   04/20/22 1250 (!) 95/53   76  98 %     Gen: No distress, pleasant. Resting in bed   HEENT: Normocephalic, atraumatic. CV: Regular rate and rhythm. Resp: No respiratory distress. CTAB  Abd: Soft, nontender  Ext: No edema. Skin: WV to sacrum and L foot with appropriate seal and minimal output  Neuro: Alert, oriented, appropriately interactive. Wt Readings from Last 3 Encounters:   04/13/22 236 lb 1.8 oz (107.1 kg)   03/10/22 255 lb 1.6 oz (115.7 kg)   10/14/21 282 lb (127.9 kg)       Laboratory data:   Lab Results   Component Value Date    WBC 8.6 04/21/2022    HGB 9.2 (L) 04/21/2022    HCT 26.9 (L) 04/21/2022    MCV 94.4 04/21/2022     04/21/2022       Lab Results   Component Value Date     04/21/2022    K 3.8 04/21/2022    CL 97 04/21/2022    CO2 28 04/21/2022    BUN 21 04/21/2022    CREATININE 1.1 04/21/2022    GLUCOSE 162 04/21/2022    CALCIUM 9.2 04/21/2022        Therapy progress:  PT  Position Activity Restriction  Other position/activity restrictions: RUE PICC, wound vacs to L foot and sacrum, rea  Objective     Sit to Stand: Maximum Assistance,Dependent/Total,2 Person Assistance  Stand to sit: Maximum Assistance,Dependent/Total,2 Person Assistance  Bed to Chair: Dependent/Total     OT  PT Equipment Recommendations  Other: TBD pending progress and increasead functional mobility assessment; likely w/c     Assessment        SLP  Current Diet : Regular  Current Liquid Diet : Thin  Diet Solids Recommendation: Regular  Liquid Consistency Recommendation:  Thin    Body mass index is 32.02 kg/m². Assessment and Plan:  Sander Barone is a 42year old male with a past medical history significant for DM2, diabetic foot ulcer with multiple amputations, HFrEF who has had a prolonged hospital course following Covid pneumonia with complications including respiratory failure, GIB, and multiple infections.  He was admitted to [facility] on 4/13/22 due to functional deficits below his baseline.      Critical Illness Myopathy  - due to covid pneumonia  - PT, OT, ST     MSSA bactermia  - with left foot abscess, left hand abscess, osteomyelitis, RUL abscess  - transitioned to oral doxycycline to continue through 4/28     Multiple wounds POA  - wound vac to sacral wound and left foot wound  - wound care     HFrEF  - EF 35%  - lasix 40 mg BID  - electrolyte replacement  - daily weights - ordered     CKD  - RODRICK due to sepsis, cardiac arrest. Required dialysis during acute stay  - Cr stbale 1.6-2 range  - monitor     Atrial Fibrillation  - BB  - AC contraindicated due to bleeding risk    HTN  - started lisinopril  - adjust medications as needed     DM2 complicated by neuropathy  - lantus plus SSI  - glucose remains uncontrolled, increase lantus to 25 -> 30, added prandial 4U, SSI  - home regimen lantus 75, prandial 15, SSI     Diabetic Neuropathy  - gabapentin      Acute blood loss anemia  - recent GIB s/p embolization of gastroduodenal artery on 2/25 and ex-lap  - monitor and transfuse for Hb<7     History of GIB  - PPI     Nausea  - PRN zofran and compazine  - started reglan  - if persistent, consider GI consult as patient has a complicated history  - improving     Urinary Retention  - flomax  - start voiding trial today  - place condom cath to prevent contamination of sacral wound if incontinence     Chronic Pain  - dilaudid 2 mg q6 hours, wean as able  - oxycodone PRN     Bowel: Per protocol  Bladder: Per protocol  Sleep: Trazodone PRN  Pain: neurontin 400, dilaudid PO 2 Q6H scheduled, tizanidine 8 BID. Tylenol and cy 5-10 PRN  DVT PPx: heparin  JARRETT: 4/29     Follow up appointments: PCP, Cardiology, wound care    Electronically signed by EVANS Ludwig CNP on 4/21/2022 at 9:39 AM

## 2022-04-21 NOTE — PROGRESS NOTES
Physical Therapy  Facility/Department: [facility]  Daily Treatment Note  NAME: Ashley Chen  : 1975  MRN: [id number]    Date of Service: 2022    Discharge Recommendations:  Continue to assess pending progress,24 hour supervision or assist,Home with Home health PT   PT Equipment Recommendations  Other: TBD pending progress and increasead functional mobility assessment; likely w/c    Patient Diagnosis(es): There were no encounter diagnoses. Restrictions: general precautions, fall risk, RUE PICC, wound vacs to L foot and sacrum    Assessment    Assessment: Patient seen as co-treat with OT to progress transfers and standing balance. Pt reporting dizziness with (+) orthostatic hypotension with mobility during session. Pt limited this session d/t symptoms, BP response with activity, and fatigue from completing multiple stands for toileting prior to session. RN notified, pt repositioned in bed at end of session. Pt requesting to begin PT/OT later in morning,  updated. Patient completed stands to transfer bed<>w/c and to trial standing activities with max A x2. Pt does not tolerate standing long enough to complete standing activities this date. Pt propels manual w/c with min A ~75 ft with assistance for navigating doorways. Pt requires max A x2 to complete sit>supine d/t symptoms. Pt will continue to benefit from skilled PT in ARU to progress strength and endurance and functional mobility. Activity Tolerance: Patient limited by fatigue;Patient limited by endurance;Treatment limited secondary to medical complications (264/04, 61%, 90 bpm; 96/56 after standing with dizziness reported, 105/65 upon returning to bed with improvement in symptoms)  Other: TBD pending progress and increasead functional mobility assessment; likely w/c     Plan    Plan  Plan weeks: 3 weeks  Current Treatment Recommendations: Strengthening;Balance training;Functional mobility training;Transfer training; Endurance training;Gait training;Neuromuscular re-education;Home exercise program;Safety education & training;Patient/Caregiver education & training     Subjective    Subjective  Subjective: pt in bed, reporting fatigue from completing multiple transfers from toileting d/t BM earlier  Pain: Pt reports a little more pain in shoulder following w/c propulsion. Objective   Vitals: 117/64, 99%, 90 bpm at start of session in bed; 96/56 after standing with pt reporting dizziness, 105/65 upon returning to bed with improvement in symptoms    Bed mobility:   Supine>sit with mod A  Sit>supine max A x2  Total Ax2 to scoot toward HOB in supine to reposition    Transfers: Pt completes 1x STS from bed and 3x from w/c with max A x2 and herb stedy. Pt tolerates brief stands only d/t fatigue from toileting prior. Pt completes w/c<>bed with herb john (dependent)    W/C propulsion: Pt propels manual w/c 75 ft with min A to navigate turning L through doorway, supervision on straight paths    Education: Pt educated on transfers technique, importance of OOB mobility, and progression of mobility prior to d/c               Addendum Second Session  Assessment: Pt seen for transfer and LE strengthening. Pt in bed upon arrival, positioned up in recliner with BLE elevated at end of session. Vitals: 106/55, pt reporting minor lightheadedness only at EOB that improves throughout session    Bed mobility: supine>sit min A with HOB elevated slightly    Transfers: Pt completes STS with assistance from PCA and herblinda lopez (appears max A x2). Pt transfers to chair with herb john dependently.      Exercises:  -1x 15 Manish seated clams against blue theraband  -1x 15 BLE seated hamstring curls against blue theraband  -1x 15 BLE LAQ  -1x 10 BLE seated marches         Goals  Short Term Goals  Time Frame for Short term goals: 10 days- 4/22/22  Short term goal 1: Pt will complete bed mobility with min A  Short term goal 2: Pt will complete functional transfers with max A x 1 using LRAD  Short term goal 3: Pt will propel manual w/c 50ft with min A; goal met 4/21 - pt propels manual w/c 75 ft with min A  Short term goal 4: Pt will tolerate gait assessment and appropriate LTG to be set  Long Term Goals  Time Frame for Long term goals : 3 weeks- 5/4/22  Long term goal 1: Pt will complete bed mobility with supervision  Long term goal 2: Pt will complete functional transfers with min A using LRAD  Long term goal 3: Pt will propel manual w/c 150ft with mod I  Patient Goals   Patient goals : \"to walk again\"      Therapy Time   Individual Concurrent Group Co-treatment   Time In 1330     1000   Time Out 1400     1100   Minutes 30     60   Timed Code Treatment Minutes: 90 Minutes       Jenny Montez, PT

## 2022-04-21 NOTE — PROGRESS NOTES
Occupational Therapy  Facility/Department: [facility]  Daily Treatment Note  NAME: Dreama Duverney  : 1975  MRN: [id number]    Date of Service: 2022    Discharge Recommendations:  Continue to assess pending progress,Patient would benefit from continued therapy after discharge  OT Equipment Recommendations  ADL Assistive Devices: Grab Bars - shower;Transfer Tub Bench      Patient Diagnosis(es): There were no encounter diagnoses. Assessment    Assessment: First session: Pt supine in bed and reporting needing to use the restroom for BM on OT arrival. RN present to assist with transfers. Pt is max x2 for functional transfers with use of [place]yl [place] and total assist for toileting and LB dressing this date. Pt limited by reports of fatigue and s/s of dizziness during session. Pt seated in reclining chair at end of session with all immediate needs met and call light within reach. Pt would continue to benefit from skilled OT services to address activity tolerance, strength, and decreased ADL status. Cont per POC. Second session: Attempted OOB activity this date. Pt completed x5 STS in [place]yl [place] with max x2 assist. W/C mobility completed with patient endorsing increased pain in R shoulder. Discussed discharge planning with patient in regards to current functional status. Will follow up with patient to further address DME needs. Pt limited by s/s of dizziness when completing transfers this date. RN made aware and requesting pt to return to bed. Pt left in bed with all immediate needs met. Cont POC. Activity Tolerance: Patient limited by fatigue;Patient limited by endurance;Treatment limited secondary to medical complications  Discharge Recommendations: Continue to assess pending progress; Patient would benefit from continued therapy after discharge      Plan   Plan  Times per Week: 5-7x per week  Current Treatment Recommendations: Strengthening;ROM;Balance training;Functional mobility training; Endurance training;Pain management; Safety education & training;Patient/Caregiver education & training;Positioning;Self-Care / ADL;Neuromuscular re-education; Wheelchair mobility training;Equipment evaluation, education, & procurement     Subjective   Subjective  Subjective: Pt presented supine in bed and requesting to use toilet upon OT entry. RN present in room administering pain medication and able to provided assistance with transfers. Pain: Pt reporting 7/10 pain in buttocks and shoulders. Provided distraction and reposition with patient satisfied. Objective    Vitals  Vitals  Pulse: 90  Heart Rate Source: Monitor  BP: 117/64  BP Location: Left upper arm  BP Method: Automatic  Patient Position: Semi fowlers  MAP (Calculated): 81.67  SpO2: 100 %  O2 Device: None (Room air)  Bed Mobility Training  Bed Mobility Training: Yes  Rolling: Stand-by assistance  Supine to Sit: Moderate assistance (HOB slightly elevated)  Sit to Supine: Moderate assistance;Assist X2 (mod A x2)  Scooting: Total assistance (total A x2 toward [facility] to reposition)  Transfer Training  Transfer Training: Yes  Sit to Stand: Assist X2;Maximum assistance; Total assistance (max A x2 STS with herb stedy from bed and toilet)  Stand to Sit: Maximum assistance; Total assistance;Assist X2 (max A x2 STS with herb stedy from bed and toilet)  Bed to Chair: Total assistance (herb stedy)     ADL  LE Dressing: Dependent/Total  LE Dressing Skilled Clinical Factors: Pt limited by fatigue and reports of dizziness while completing LB dressing in herb stedy  Toileting: Maximum assistance (Assist provided for all parts.)  Additional Comments: Pt declining further ADLs at this time           Patient Education  Education Given To: Patient  Education Provided: Role of Therapy;Plan of Care;Energy Conservation;Transfer Training;Precautions  Education Provided Comments: Pt educated on OT POC, transfer training, energy conservation and importance of OOB mobility.   Education Method: Verbal  Barriers to Learning: None  Education Outcome: Verbalized understanding    Goals  Short Term Goals  Time Frame for Short term goals: Pt will complete UB dressing with min A- GOAL MET; Pt SBA for UB dressing at EOB  Short Term Goal 1: Pt will complete LB dressing with mod A  Short Term Goal 2: Pt will complete LB bathing with mod A  Short Term Goal 3: Pt will complete UB bathing with SBA  Short Term Goal 4: Pt will complete 3 grooming task at w/c level  Short Term Goal 5: Pt will complete x20 reps of BUE HEP to improve UB strength/endurance for repositioning and UB ADLs- GOAL MET 4/20;  Pt completing x20 BUE AROM  Long Term Goals  Time Frame for Long term goals : 3 weeks (4/05)  Long Term Goal 1: Pt will complete total body dressing with supv  Long Term Goal 2: Pt will complete total body bathing with supv  Long Term Goal 3: Pt will complete 3 grooming tasks at sink with mod I  Long Term Goal 4: Pt will perform functional transfers with mod I  Patient Goals   Patient goals : 1 week (4/21)       Therapy Time   Individual Concurrent Group Co-treatment   Time In 0830     1000   Time Out 0900     1100   Minutes 30     60   Timed Code Treatment Minutes: 22 [address] Celia Abarca, OT

## 2022-04-22 NOTE — PROGRESS NOTES
Davdi Sole  4/22/2022  [id number]    Chief Complaint: Critical illness myopathy    Subjective:   No overnight events. Glucoses improved. Requiring ICs. No spontaneous void thus far. BP soft this am. No daily weights. ROS: denies f/c, sob, cp    Objective:  Patient Vitals for the past 24 hrs:   BP Temp Temp src Pulse Resp SpO2   04/22/22 1204 (!) 110/55   82  92 %   04/22/22 0800 105/60 97 °F (36.1 °C) Oral 75 16 95 %   04/21/22 2239 121/68 98.4 °F (36.9 °C) Oral 88 18 98 %     Gen: No distress, pleasant. Resting in bed   HEENT: Normocephalic, atraumatic  CV: Regular rate and rhythm   Resp: No respiratory distress. CTAB  Abd: Soft, nontender, nondistended  Ext: No edema  Skin: WV to sacrum and L foot with appropriate seal and minimal output  Neuro: Alert, oriented, appropriately interactive.      Wt Readings from Last 3 Encounters:   04/13/22 236 lb 1.8 oz (107.1 kg)   03/10/22 255 lb 1.6 oz (115.7 kg)   10/14/21 282 lb (127.9 kg)       Laboratory data:   Lab Results   Component Value Date    WBC 8.6 04/21/2022    HGB 9.2 (L) 04/21/2022    HCT 26.9 (L) 04/21/2022    MCV 94.4 04/21/2022     04/21/2022       Lab Results   Component Value Date     04/21/2022    K 3.8 04/21/2022    CL 97 04/21/2022    CO2 28 04/21/2022    BUN 21 04/21/2022    CREATININE 1.1 04/21/2022    GLUCOSE 162 04/21/2022    CALCIUM 9.2 04/21/2022        Therapy progress:  PT  Position Activity Restriction  Other position/activity restrictions: RUE PICC, wound vacs to L foot and sacrum, rea  Objective     Sit to Stand: Maximum Assistance,Dependent/Total,2 Person Assistance  Stand to sit: Maximum Assistance,Dependent/Total,2 Person Assistance  Bed to Chair: Dependent/Total     OT  PT Equipment Recommendations  Other: TBD pending progress and increasead functional mobility assessment; likely w/c     Assessment        SLP  Current Diet : Regular  Current Liquid Diet : Thin  Diet Solids Recommendation: Regular  Liquid Consistency Recommendation: Thin    Body mass index is 32.02 kg/m². Assessment and Plan:  Martina Escobar is a 42year old male with a past medical history significant for DM2, diabetic foot ulcer with multiple amputations, HFrEF who has had a prolonged hospital course following Covid pneumonia with complications including respiratory failure, GIB, and multiple infections. He was admitted to [facility] on 4/13/22 due to functional deficits below his baseline.      Critical Illness Myopathy  - due to covid pneumonia  - PT, OT, ST     MSSA bactermia  - with left foot abscess, left hand abscess, osteomyelitis, RUL abscess  - transitioned to oral doxycycline to continue through 4/28     Multiple wounds POA  - wound vac to sacral wound and left foot wound  - wound care     HFrEF  - EF 35%  - lasix 40 mg BID -> daily  - electrolyte replacement  - daily weights - ordered, Discussed with RN manager     CKD  - RODRICK due to sepsis, cardiac arrest. Required dialysis during acute stay  - Cr stbale 1.6-2 range  - monitor     Atrial Fibrillation  - BB  - AC contraindicated due to bleeding risk    HTN  - started lisinopril 5  - lopressor 25     DM2 complicated by neuropathy  - increased lantus to 25, added prandial 4U, SSI  - home regimen lantus 75, prandial 15, SSI     Diabetic Neuropathy  - gabapentin 400 TID     Acute blood loss anemia  - recent GIB s/p embolization of gastroduodenal artery on 2/25 and ex-lap  - monitor and transfuse for Hb<7     History of GIB  - PPI     Nausea  - PRN zofran and compazine  - started reglan     Urinary Retention  - flomax  - continue voiding trial, ICs per protocol  - place condom cath to prevent contamination of sacral wound if incontinence     Chronic Pain  - dilaudid 2 mg q6 hours, wean as able  - oxycodone PRN     Bowel: Per protocol  Bladder: Per protocol  Sleep: Trazodone PRN  Pain: neurontin 400, dilaudid PO 2 Q6H scheduled, tizanidine 8 BID.  Tylenol and cy 5-10 PRN  DVT PPx: heparin  JARRETT: 4/29     Follow up appointments: PCP, Cardiology, wound care    Electronically signed by Mahin Rangel MD on 4/22/2022 at 12:54 PM

## 2022-04-22 NOTE — PROGRESS NOTES
tolerated. Education:  Pt educated on transfer technique, weight shift and foot placement when stepping, importance of progressing strength and mobility - demos understanding, limited by fatigue    ASSESSMENT/PROGRESS TOWARDS GOALS     Assessment  Assessment: Patient seen as co-treat with OT to facilitate safe transfers training. Patient completed STS with herb lopez with max A X2 and in // bars with max A x1-2. Pt tolerates standing 20-30 seconds and attempts to take step with LLE but requires sitting immediately following attempts. Pt reporting increased hip pain, transitioned to activities at [facility] to trial scooting and beasy board transfer. Pt demos scooting toward R better than to the L with min A x1 + CGA x1 for safety and cues. Pt unable to to complete beasy board transfer d/t dizziness. Pt will continue to benefit from skilled PT in ARU. Will continue to progress strength, endurance, balance, and transfers.   Activity Tolerance: Patient limited by fatigue;Patient limited by endurance;Treatment limited secondary to medical complications  Discharge Recommendations: Continue to assess pending progress;24 hour supervision or assist;Home with Home health PT    Goals  Patient Goals   Patient goals : \"to walk again\"  Short Term Goals  Time Frame for Short term goals: 10 days- 4/22/22  Short term goal 1: Pt will complete bed mobility with min A; goal partially met 4/22 - pt completes supine>sit with min A and up to mod A x2 for sit>supine  Short term goal 2: Pt will complete functional transfers with max A x 1 using LRAD; not met 4/22 - pt requires up to max A x2 in // bars and herb lopez  Short term goal 3: Pt will propel manual w/c 50ft with min A; goal met 4/21 - pt propels manual w/c 75 ft with min A  Short term goal 4: Pt will tolerate gait assessment and appropriate LTG to be set; goal not met 4/22 - pt attempts to step but unable to complete successfully to attempt walking  Long Term Goals  Time Frame for Long term goals : 3 weeks- 5/4/22  Long term goal 1: Pt will complete bed mobility with supervision  Long term goal 2: Pt will complete functional transfers with min A using LRAD  Long term goal 3: Pt will propel manual w/c 150ft with mod I    Addendum Second Session  Assessment: Pt seen for LE strengthening and transfers. PCA present to assist with transfer bed>chair. Pt agreeable to sit up in chair at end of session. Pt denies dizziness during session, BP on lower side. Pt's mother present throughout session. Vitals: 95/52->99/52 in chair    Bed mobility: supine>sit with min A and increased time with use of bedrail    Transfers: Pt completes STS from bed with use of herb stedy and Ax2 from PCA (appears max A x2). Pt transferred to recliner with herb stedy dependently. Exercises:  -1x 15 BLE heel slides with therapist supporting under heels  -1x 15 dax glute sets  -1x 15 BLE quad sets      [address] weeks: 3 weeks  Current Treatment Recommendations: Strengthening;Balance training;Functional mobility training;Transfer training; Endurance training;Gait training;Neuromuscular re-education;Home exercise program;Safety education & training;Patient/Caregiver education & training  Safety Devices  Type of Devices: All fall risk precautions in place;Call light within reach;Nurse notified; Patient at risk for falls;Gait belt;Bed alarm in place; Left in bed  Restraints  Restraints Initially in Place: No      Therapy Time   Individual Concurrent Group Co-treatment   Time In 1230     1030   Time Out 1300     1130   Minutes 30     60     Timed Code Treatment Minutes: [address], PT, 04/22/22 at 3:33 PM

## 2022-04-22 NOTE — PROGRESS NOTES
Occupational Therapy  Facility/Department: [facility]  Daily Treatment Note  NAME: Jono Stephens  : 1975  MRN: [id number]    Date of Service: 2022    Discharge Recommendations:  Continue to assess pending progress,Patient would benefit from continued therapy after discharge  OT Equipment Recommendations  Equipment Needed: Yes  ADL Assistive Devices: Grab Bars - shower;Transfer Tub Bench  Other: CTA pending progress      Patient Diagnosis(es): There were no encounter diagnoses. Assessment    Assessment: First session: Pt complete STS in // bars with max x2 assist. PT facilitated weight shifting and knee blocking for R/L however patient reporting pain in hips resulting in ceasation of activity. Engaged pt in transfer training at mat table. Pt able to clear buttocks 4/10 attempts to R and 2/8 attempts to L. Attempted use of Beasy Board to initated slide board transfers however pt reporting pain in hip with board placement as well as shoulders. Pt returned to bed at end of session d/t reports of dizziness with RN aware. Continue per POC. Second Session: Pt completed x3 STS in [address] [place] [address] with max x2 assist. Pt completed sponge bath this date with use of long handled sponge requiring 2 person assist for cleaning buttocks only. Issued patient sock aid with pt verbalizing/demonstrating understanding. Pt continues to be max A for LB dressing and setup for UB dressing. Pt returned to bed at end of session. Cont POC. Activity Tolerance: Patient limited by fatigue;Patient limited by endurance;Treatment limited secondary to medical complications  Discharge Recommendations: Continue to assess pending progress; Patient would benefit from continued therapy after discharge  Equipment Needed: Yes  Other: CTA pending progress      Plan   Plan  Times per Week: 5-7x per week  Current Treatment Recommendations: Strengthening;ROM;Balance training;Functional mobility training; Endurance training;Pain management; Safety education & training;Patient/Caregiver education & training;Positioning;Self-Care / ADL;Neuromuscular re-education; Wheelchair mobility training;Equipment evaluation, education, & procurement     Subjective   Subjective  Subjective: Pt presented supine in bed upon entry and agreeable to OT/PT cotx. RN reporting holding BP medications this AM. Pt continues to report increased dizziness with activity. RN aware. Pain: Pt reporting 3/10 pain in buttocks and shoulders. Provided distraction, uncreased activity and reposition with patient satisfied. Cognition  Overall Cognitive Status: [facility]        Objective    Vitals  Vitals  Pulse: 82  Heart Rate Source: Monitor  BP: (!) 110/55  BP Location: Left upper arm  BP Method: Automatic  Patient Position: Semi fowlers  MAP (Calculated): 73.33  SpO2: 92 %  O2 Device: None (Room air)    Bed Mobility Training  Bed Mobility Training: Yes  Overall Level of Assistance: Moderate assistance  Interventions: Verbal cues; Safety awareness training  Supine to Sit: Minimum assistance  Sit to Supine: Moderate assistance;Assist X2 (Pt limited by fatigue resulting in Ax2 for leg and trunk positioning)  Scooting: Minimum assistance (Initially CGA however progressing to Min A d/t fatigue)    Balance  Sitting: With support  Standing: Impaired  Standing - Static: Constant support  Standing - Dynamic: Constant support    Transfer Training  Transfer Training: Yes  Overall Level of Assistance: Additional time  Sliding Board:  (Attempted use of [place] board transfer however pt reporting pain in hip and unable to clear buttocks over board)     ADL  UE Dressing: Setup (Pt able to doff pullover shirt and down hospital gown with setup)  LE Dressing: Dependent/Total (socks)  Additional Comments: Pt declining further ADLs at this time  OT Exercises  Pressure Relief Exercises: Pt completed x10 scooting L/R for pressure releif and transfer training.  Pt unable to extend rest >50* resulting neutral wrist position and use of knuckles to bear weight. Dynamic Standing Balance Exercises: x8 STS to/from EOB, w/c, mat table. Pt reporting increased fatigue and dizziness during last two reps. Max A x2 required. Pt tolerated standing x3 times for 10-30 seconds with B Knee block. Faciliation of bilateral weight shift  provided in // bars. Goals  Short Term Goals  Time Frame for Short term goals: Pt will complete UB dressing with min A- GOAL MET; Pt SBA for UB dressing at EOB  Short Term Goal 1: Pt will complete LB dressing with mod A  Short Term Goal 2: Pt will complete LB bathing with mod A  Short Term Goal 3: Pt will complete UB bathing with SBA  Short Term Goal 4: Pt will complete 3 grooming task at w/c level  Short Term Goal 5: Pt will complete x20 reps of BUE HEP to improve UB strength/endurance for repositioning and UB ADLs- GOAL MET 4/20;  Pt completing x20 BUE AROM  Long Term Goals  Time Frame for Long term goals : 3 weeks (4/05)  Long Term Goal 1: Pt will complete total body dressing with supv  Long Term Goal 2: Pt will complete total body bathing with supv  Long Term Goal 3: Pt will complete 3 grooming tasks at sink with mod I  Long Term Goal 4: Pt will perform functional transfers with mod I  Patient Goals   Patient goals : 1 week (4/21)       Therapy Time   Individual Concurrent Group Co-treatment   Time In 1330     1030   Time Out 1415     1130   Minutes 45     60   Timed Code Treatment Minutes: Carol Oconnor [facility], [place]

## 2022-04-22 NOTE — PROGRESS NOTES
[facility] Nurse  Follow-up Progress Note       NAME:  Han Aden  MEDICAL RECORD NUMBER:  [id number]  AGE:  42 y.o. GENDER:  male  :  1975  TODAY'S DATE:  2022    Subjective:  I am totally worn out today. Wife at bed side   Wound Identification:  Wound Type: traumatic left foot. Healing stage 4 pressure injury coccyx/sacrum. LDA to hand complete  Contributing Factors:  edema, diabetes, chronic pressure, decreased mobility, shear force, obesity, incontinence of stool and incontinence of urine        Patient Goal of Care:  [x] Wound Healing  [] Odor Control   [] Palliative Care  [] Pain Control   [] Other:     Objective: lying in bed. Wife at bed side. BP (!) 110/55   Pulse 82   Temp 97 °F (36.1 °C) (Oral)   Resp 16   Ht 6' (1.829 m)   Wt 236 lb 1.8 oz (107.1 kg)   SpO2 92%   BMI 32.02 kg/m²   Shimon Risk Score: Shimon Scale Score: 15  Assessment: Left foot hypergranulation tissue present now. NPWT left off this wound will start Moist to Moist dressings bid. Coccyx wound improving placed duoderm dressing over the flat area on left buttocks. Smaller sponge needed in wound bed. Did not measure or photo today. Wife observed wound and pleased to see progression toward healing   Measurements:  Negative Pressure Wound Therapy Coccyx (Active)   $ Standard NPWT <=50 sq cm PER TX $ Yes 22 1704   $ Standard NPWT >50 sq cm PER TX $ Yes 22 1108   Wound Type Pressure ulcer: Stage IV;Surgical 22 170   Unit Type [employer] VFVR [id number] 22 170   Dressing Type Black Foam 22 1704   Number of pieces used 1 22 1704   Cycle Continuous 22 1704   Target Pressure (mmHg) 125 22 1704   Intensity 1 22 1704   Canister changed?  Yes 22 1704   Dressing Status New dressing applied 22 170   Dressing Changed Changed/New 22 1704   Drainage Amount Small 22 170   Drainage Description Serosanguinous 04/22/22 1704   Dressing Change Due 04/25/22 04/22/22 1704   Output (ml) 50 ml 04/22/22 1704   Wound Assessment Epithelialization;Granulation tissue 04/22/22 1704   Shape heart shaped 04/22/22 1704   Odor None 04/22/22 1704   Number of days: 79       Wound 01/24/22 Foot Left;Dorsal I & D to left dorsal foot by Dr. Sharad Castro, surgical wound (Active)   Number of days: 88       Wound 01/30/22 Sacrum SDTI (Active)   Wound Image   04/20/22 1052   Wound Etiology Pressure Stage  4 04/22/22 1704   Dressing Status New dressing applied 04/22/22 1704   Wound Cleansed Irrigated with saline 04/22/22 1704   Dressing/Treatment Barrier film;Hydrocolloid; Negative pressure wound therapy 04/22/22 1704   Offloading for Diabetic Foot Ulcers Offloading ordered 04/22/22 1704   Dressing Change Due 04/25/22 04/22/22 1704   Wound Length (cm) 11.5 cm 04/20/22 1052   Wound Width (cm) 7.5 cm 04/20/22 1052   Wound Depth (cm) 1.4 cm 04/20/22 1052   Wound Surface Area (cm^2) 86.25 cm^2 04/20/22 1052   Change in Wound Size % (l*w) -331.25 04/20/22 1052   Wound Volume (cm^3) 120.75 cm^3 04/20/22 1052   Wound Healing % -788 04/20/22 1052   Distance Tunneling (cm) 0 cm 04/22/22 1704   Tunneling Position ___ O'Clock 0 04/22/22 1704   Undermining Starts ___ O'Clock 0 04/22/22 1704   Undermining Ends___ O'Clock 0 04/22/22 1704   Undermining Maxium Distance (cm) 0 04/22/22 1704   Wound Assessment Epithelialization;Granulation tissue 04/22/22 1704   Drainage Amount Small 04/22/22 1704   Drainage Description Serosanguinous 04/22/22 1704   Odor None 04/22/22 1704   Shanita-wound Assessment Dry/flaky; Intact 04/22/22 1704   Margins Attached edges; Defined edges 04/22/22 1704   Wound Thickness Description not for Pressure Injury Full thickness 04/22/22 1704   Number of days: 82     Incision 03/08/22 Foot Anterior; Left (Active)   Wound Image   04/20/22 1052   Dressing Status New dressing applied 04/22/22 1704   Dressing Change Due 04/23/22 04/22/22 1704 Incision Cleansed Cleansed with saline 04/22/22 1704   Dressing/Treatment Barrier film; Moist to moist 04/22/22 1704   Incision Length (cm) 2 04/20/22 1052   Incision Width (cm) 5 cm 04/20/22 1052   Incision Depth (cm) 0.2 cm 04/20/22 1052   Margins Other (Comment) 04/22/22 1704   Incision Assessment Epithelialization 04/22/22 1704   Drainage Amount Small 04/22/22 1704   Drainage Description Serosanguinous 04/22/22 1704   Odor None 04/22/22 1704   Patricia-incision Assessment Dry/flaky; Intact 04/22/22 1704   Number of days: 45       Response to treatment:  Well tolerated by patient. Pain Assessment:  Severity:  0 / 10  Quality of pain: N/A  Wound Pain Timing/Severity: none  Premedicated: No  Plan:   Plan of Care: Wound 01/30/22 Sacrum SDTI-Dressing/Treatment: Demarco Timmons film,Hydrocolloid,Negative pressure wound therapy  [REMOVED] Wound 02/14/22 Head Left; Lower; Posterior Friction/pressure wound-unstageable. 4/14/22 healed now, will complete LDA per CWOCN-Dressing/Treatment: Open to air  [REMOVED] Wound 01/24/22 Hand Left;Dorsal-Dressing/Treatment: Open to air    Current dressing removed. Cleaned wounds with normal saline. Left foot wound moist to moist dsg applied, dry dressing and secured with roll guaze and tape. One black sponge placed in right and mid coccyx. Left buttocks covered with duoderm dressing. tracked to left hip. Connected to 125 mmHg low continuous suction. Wound care to continue to follow. Call Wound care for problems with seal at [phone] or call [phone] and leave message. Thanks    Recommend:  Clean feet and legs with warm soapy water, foamy cleanser or bath wipes daily.  Pat dry.  Apply aquapor ointment to feet and legs daily. Clean left foot wound with normal saline. Prep patricia wound with zinc paste or barrier film. Apply moist to moist dressing daily (avoid putting dsg on healthy skin)  to left foot. Cover with dry dressing and roll guaze.  Continue NPWT dressing to coccyx wound change 3 times a week. Specialty Bed Required : Yes Dolphin mattress  [x] Low Air Loss   [] Pressure Redistribution  [x] Fluid Immersion  [] Bariatric  [] Total Pressure Relief  [] Other:     Current Diet: ADULT DIET; Regular; 4 carb choices (60 gm/meal)  ADULT ORAL NUTRITION SUPPLEMENT; Breakfast, Dinner, Lunch; Diabetic Oral Supplement  Dietician consult:  Yes    Discharge Plan:  Placement for patient upon discharge: unknown at this time  Patient appropriate for Outpatient [address]: Yes    Referrals:  [x]   [] [facility]  [] Supplies  [] Other    Patient/Caregiver Teaching: updated wife on healing and change in treatment.   Level of patient/caregiver understanding able to:   [] Indicates understanding       [] Needs reinforcement  [] Unsuccessful      [x] Verbal Understanding  [] Demonstrated understanding       [] No evidence of learning  [] Refused teaching         [] N/A       Electronically signed by Ilia Hughes RN, MSN, [place] Whitaker on 4/22/2022 at 5:10 PM

## 2022-04-23 NOTE — PROGRESS NOTES
Physical Therapy  Facility/Department: [facility]  Rehabilitation Physical Therapy Treatment Note    NAME: Han Treadwell  : 1975 (42 y.o.)  MRN: [id number]  CODE STATUS: Full Code    Date of Service: 22       Restrictions:  Restrictions/Precautions: General Precautions; Fall Risk  Position Activity Restriction  Other position/activity restrictions: RUE PICC, wound vacs to L foot and sacrum     SUBJECTIVE  Subjective  Subjective: Pt found in bed with nursing care. Pt reporting increased fatigue this date and requesting bed level activity  Pain: Reports 5/10 pain in back and hips. Post Treatment Pain Screenin/10     OBJECTIVE  Cognition  Overall Cognitive Status: WFL  Orientation  Overall Orientation Status: Within Functional Limits    Functional Mobility  Bridging  Assistance Level: Modified independent (Cues for positioning and sequencing)  Roll Left  Assistance Level: Moderate assistance  Roll Right  Assistance Level: Moderate assistance  Scooting  Assistance Level: Dependent; Requires x 2 assistance (to scoot to the [facility] in supine with RN assist)      PT Exercises  A/AROM Exercises: Hip IR/ER, Heel slides, glut sets, quad sets, ABD set, 15 reps on BLE; 3 sets of 5 of bridging and partial sit up with HOB elevated. Extended time to educate on rationalle for each exercise and for pain management. ASSESSMENT/PROGRESS TOWARDS GOALS  Vital Signs  Pulse: 94  BP: 120/77  BP Location: Left upper arm  MAP (Calculated): 91.33  SpO2: 94 %  O2 Device: None (Room air)    Assessment  Assessment: Pt seen for supine ther ex in the bed d/t increased fatigue and Ax1 during most of session. RN assisted with mobility to clean brief and position at the end of session. Pt is very motivated and requires cues for proper positioning/sequencing. Pt given rest breaks for pain management and activity tolerance. Pt will benefit from continued skilled PT in the [facility] for increasing independence.   Activity Tolerance: Patient limited by fatigue;Patient limited by endurance; Patient limited by pain  Discharge Recommendations: Continue to assess pending progress;24 hour supervision or assist;Home with Home health PT    Goals  Patient Goals   Patient goals : \"to walk again\"  Short Term Goals  Time Frame for Short term goals: 10 days- 4/22/22  Short term goal 1: Pt will complete bed mobility with min A; goal partially met 4/22 - pt completes supine>sit with min A and up to mod A x2 for sit>supine  Short term goal 2: Pt will complete functional transfers with max A x 1 using LRAD; not met 4/22 - pt requires up to max A x2 in // bars and herb stedy  Short term goal 3: Pt will propel manual w/c 50ft with min A; goal met 4/21 - pt propels manual w/c 75 ft with min A  Short term goal 4: Pt will tolerate gait assessment and appropriate LTG to be set; goal not met 4/22 - pt attempts to step but unable to complete successfully to attempt walking  Long Term Goals  Time Frame for Long term goals : 3 weeks- 5/4/22  Long term goal 1: Pt will complete bed mobility with supervision  Long term goal 2: Pt will complete functional transfers with min A using LRAD  Long term goal 3: Pt will propel manual w/c 150ft with mod I    PLAN OF CARE/SAFETY  Plan  Plan:  minutes of therapy at least 5 out of 7 days a week  Plan weeks: 3 weeks  Current Treatment Recommendations: Strengthening;Balance training;Functional mobility training;Transfer training; Endurance training;Gait training;Neuromuscular re-education;Home exercise program;Safety education & training;Patient/Caregiver education & training  Safety Devices  Type of Devices: All fall risk precautions in place;Call light within reach;Nurse notified; Patient at risk for falls; Bed alarm in place; Left in bed  Restraints  Restraints Initially in Place: No    Therapy Time   Individual Concurrent Group Co-treatment   Time In 1010         Time Out 1045         Minutes 35           Timed Code Treatment Minutes: [address], 04/23/22 at 11:54 AM

## 2022-04-23 NOTE — PROGRESS NOTES
Pt continues to be unable to urinate. Bladder scanned with 757mls noted. Straight cathed 750mls of clear urine. Tolerated procedure well.

## 2022-04-23 NOTE — PLAN OF CARE
Patient has multiple wounds on body that were previously noted prior to this shift. Wound care on board, dressings clean dry and intact. No problems noted with wound vac at the moment. Patient does require some assistance with turning but is able to turn himself when needed. Pillows under patient to prevent any new breakdown.

## 2022-04-23 NOTE — PROGRESS NOTES
Occupational Therapy  Facility/Department: [facility]  Rehabilitation Occupational Therapy Daily Treatment Note    Date: 22  Patient Name: Ashley Chen       Room: [facility]/0997-20  MRN: [id number]  Account: [de-identified]   : 1975  (41 y.o.) Gender: male                    Past Medical History:  has a past medical history of Abscess of left foot, Abscess of left hand, Acute encephalopathy, Acute hypoxemic respiratory failure ([address] Acute osteomyelitis of left hand ([address] Acute osteomyelitis of right hallux ([address] Cardiopulmonary arrest ([address] Cellulitis of left foot, CHF (congestive heart failure) ([address] Chronic osteomyelitis of left foot ([address] Closed displaced fracture of distal phalanx of right little finger, Clostridium difficile infection, Diabetic ulcer of left forefoot associated with type 2 diabetes mellitus, with necrosis of bone ([address] Diabetic ulcer of right great toe associated with type 2 diabetes mellitus, with necrosis of bone ([address] Enterococcal infection, 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 Past Surgical History:   has a past surgical history that includes Tonsillectomy; Central tooth extraction; Knee arthroscopy (Left, 08/15/2013); Toe amputation (Right, 2019); other surgical history (Left, 2020); Toe amputation (Left, 2020); Toe amputation (Left, 10/22/2020); Foot Debridement (Left, 2022); Arm Surgery (Left, 2022);  IR NONTUNNELED VASCULAR CATHETER > 5 YEARS (02/11/2022); Upper gastrointestinal endoscopy (N/A, 02/17/2022); IR EMBOLIZATION HEMORRHAGE (Right, 02/25/2022); Upper gastrointestinal endoscopy (N/A, 2/27/2022); laparotomy (N/A, 2/27/2022); Foot Debridement (Left, 3/8/2022); Cholecystectomy; Dilatation, esophagus; and Endoscopy, colon, diagnostic. Restrictions  Restrictions/Precautions: General Precautions; Fall Risk  Other position/activity restrictions: RUE PICC, wound vacs to L foot and sacrum    Subjective  Subjective: Pt presented supine in bed upon entry and agreeable to OT, wife present. Pt reports increased fatigue but agreeable to BUE ex and simple grooming sitting upright in bed. Restrictions/Precautions: General Precautions; Fall Risk             Objective     Cognition  Overall Cognitive Status: WFL  Orientation  Overall Orientation Status: Within Functional Limits         ADL  Feeding  Assistance Level: Increased time to complete;Set-up; Stand by assist  Grooming/Oral Hygiene  Assistance Level: Increased time to complete;Supervision;Set-up  Toileting  Assistance Level: Dependent  Skilled Clinical Factors: attempted to use urinal, RN made aware to bladder scan patient         OT Exercises  A/AROM Exercises: x20 of the following exercises: shoulder flexion, elbow flexion/ext, wrist flexion/ext, finger flexion/ext. and given HEP handout for wife and patient to complete in bed (4x5 reps); pt then used handout and blue theraband to complete 5x5 reps chest press, internal/external rotation, elbow flexion/extension, tricep press, diagonals; rest breaks PRN     Assessment  Assessment  Assessment: Patient in bed, agreeable and pleasant to BUE, wife inquiring about AROM vs threraband bed level vs chair exercises to complete between therapy sessions to increase strength/tolerance. Pt was able to complete theraband and AROM following handout HEP wiht PRN cues for mechanics and rest breaks PRN due to fatigue.   Pt left in bed with all needs, limited by pain and fatigue this date. Encouraged patient to get to chair during the weekend to increase endurance./tolerance. Cont OT POC. Activity Tolerance: Patient limited by fatigue;Patient limited by endurance; Patient limited by pain  Discharge Recommendations: Continue to assess pending progress; Patient would benefit from continued therapy after discharge  OT Equipment Recommendations  Equipment Needed: Yes  Mobility Devices: ADL Assistive Devices  ADL Assistive Devices: Grab Bars - shower;Transfer Tub Bench  Other: CTA pending progress  Safety Devices  Safety Devices in place: Yes  Type of devices: Left in bed;Bed alarm in place; Patient at risk for falls;Gait belt; All fall risk precautions in place;Nurse notified;Call light within reach  Restraints  Initially in place: No    Patient Education  Education  Education Given To: Patient; Family  Education Provided: ADL Function;Role of Therapy;Plan of Care;Equipment;Home Exercise Program;Precautions; Safety; Energy Conservation; Family Education  Education Method: Demonstration;Verbal;Teach Back  Barriers to Learning: None  Education Outcome: Verbalized understanding;Demonstrated understanding;Continued education needed    Plan  Plan  Times per Week: 5-7x per week  Times per Day: Daily  Current Treatment Recommendations: Strengthening;ROM;Balance training;Functional mobility training; Endurance training;Pain management; Safety education & training;Patient/Caregiver education & training;Positioning;Self-Care / ADL;Neuromuscular re-education; Wheelchair mobility training;Equipment evaluation, education, & procurement    Goals       Therapy Time   Individual Concurrent Group Co-treatment   Time In 1230         Time Out 1300         Minutes 30         Timed Code Treatment Minutes: [id number] Casie Lopez OTR/L

## 2022-04-23 NOTE — PLAN OF CARE
Problem: Pain:  Goal: Pain level will decrease  Description: Pain level will decrease  Outcome: Progressing     Problem: Pain:  Goal: Control of acute pain  Description: Control of acute pain  Outcome: Progressing     Problem: Pain:  Goal: Control of chronic pain  Description: Control of chronic pain  Outcome: Progressing     Problem: Skin Integrity:  Goal: Absence of new skin breakdown  Description: Absence of new skin breakdown  Outcome: Progressing

## 2022-04-24 NOTE — PLAN OF CARE
Problem: Falls - Risk of:  Goal: Will remain free from falls  Description: Will remain free from falls  Outcome: Progressing     Problem: Pain:  Goal: Control of acute pain  Description: Control of acute pain  Outcome: Progressing

## 2022-04-25 NOTE — CARE COORDINATION
MATTHEW spoke with Medardo Gomez from /[facility] via telephone for referral for a wound vac for home. Medardo Gomez will send script through a secured email.  Chidi Mcmahon RN Breathing spontaneous and unlabored. Breath sounds clear and equal bilaterally with regular rhythm.

## 2022-04-25 NOTE — PLAN OF CARE
Problem: Pain:  Goal: Control of acute pain  Description: Control of acute pain  Outcome: Progressing     Problem: Falls - Risk of:  Goal: Will remain free from falls  Description: Will remain free from falls  Outcome: Progressing

## 2022-04-25 NOTE — PROGRESS NOTES
Occupational Therapy  Facility/Department: [facility] ARU  Rehabilitation Occupational Therapy Daily Treatment Note    Date: 22  Patient Name: Angi Anthony       Room: 8448/5667-62  MRN: [id number]  Account: [de-identified]   : 1975  (41 y.o.) Gender: male                    Past Medical History:  has a past medical history of Abscess of left foot, Abscess of left hand, Acute encephalopathy, Acute hypoxemic respiratory failure ([address] Acute osteomyelitis of left hand ([address] Acute osteomyelitis of right hallux ([address] Cardiopulmonary arrest ([address] Cellulitis of left foot, CHF (congestive heart failure) ([address] Chronic osteomyelitis of left foot ([address] Closed displaced fracture of distal phalanx of right little finger, Clostridium difficile infection, Diabetic ulcer of left forefoot associated with type 2 diabetes mellitus, with necrosis of bone ([address] Diabetic ulcer of right great toe associated with type 2 diabetes mellitus, with necrosis of bone ([address] Enterococcal infection, 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 Past Surgical History:   has a past surgical history that includes Tonsillectomy; [place] tooth extraction; Knee arthroscopy (Left, 08/15/2013); Toe amputation (Right, 2019); other surgical history (Left, 2020); Toe amputation (Left, 2020); Toe amputation (Left, 10/22/2020); Foot Debridement (Left, 2022); Arm Surgery (Left, 2022);  IR NONTUNNELED VASCULAR CATHETER > 5 YEARS (02/11/2022); Upper gastrointestinal endoscopy (N/A, 02/17/2022); IR EMBOLIZATION HEMORRHAGE (Right, 02/25/2022); Upper gastrointestinal endoscopy (N/A, 2/27/2022); laparotomy (N/A, 2/27/2022); Foot Debridement (Left, 3/8/2022); Cholecystectomy; Dilatation, esophagus; and Endoscopy, colon, diagnostic. Restrictions  Restrictions/Precautions: General Precautions; Fall Risk  Other position/activity restrictions: RUE PICC, wound vacs to L foot and sacrum    Subjective  Subjective: Pt presented supine in bed upon entry and agreeable to OT; requesting to use the bathroom. Restrictions/Precautions: General Precautions; Fall Risk             Objective     Cognition  Overall Cognitive Status: WFL  Orientation  Overall Orientation Status: Within Functional Limits         ADL  Feeding  Assistance Level: Increased time to complete;Set-up; Stand by assist  Grooming/Oral Hygiene  Assistance Level: Increased time to complete;Supervision;Set-up  Upper Extremity Dressing  Assistance Level: Moderate assistance (sitting EOB over head and around the back)  Lower Extremity Dressing  Assistance Level: Dependent; Requires x 2 assistance  Putting On/Taking Off Footwear  Assistance Level: Increased time to complete; Requires x 2 assistance;Dependent  Toileting  Assistance Level: Requires x 2 assistance;Dependent  Skilled Clinical Factors: sitting on BSC over toilet, use of steady for tranfser  Toilet Transfers  Technique:  (steady EOB to [facility])  Equipment: Beside commode (over toilet)  Additional Factors: Increased time to complete;Cues for hand placement  Assistance Level: Requires x 2 assistance;Maximum assistance  Skilled Clinical Factors: sitting on BSC over toilet, use of steady for tranfser     Functional Mobility  Device:  (steady)  Activity: To/From bathroom  Assistance Level: Maximum assistance; Requires x 2 assistance  Skilled Clinical Factors: maxA x2 sit <> stand from EOB to steady to [facility]  Bed Mobility  Overall Assistance Level: Requires x 2 Assistance;Minimal Assistance  Additional Factors: With handrails; Head of bed raised; Increased time to complete;Verbal cues  Supine  Assistance Level: Minimal assistance  Supine to Sit  Assistance Level: Minimal assistance  Scooting  Assistance Level: Minimal assistance  Transfers  Surface: From bed;Bedside commode  Additional Factors: Increased time to complete; Mat raised; With handrails;Verbal cues; Hand placement cues  Device: Lift equipment (steady)  Sit to Stand  Assistance Level: Dependent;Maximum assistance; Requires x 2 assistance  Skilled Clinical Factors: sitting on BSC over toilet, use of steady for tranfser  Stand to Sit  Assistance Level: Maximum assistance; Requires x 2 assistance  Bed To/From Chair  Technique:  (sitting on BSC over toilet, use of steady for tranfser)  Assistance Level: Requires x 2 assistance;Maximum assistance         Assessment  Assessment  Assessment: Patient remains pleasant and agreeable to therapy, urgently requesting to use bathroom. Agreeable to getting up with OT and PCA to steady on toilet. Supine to sit Luisa, modA x2 sit <> stand, increased endurance this AM session for standing. Pt needing x15 minute for BM attempt, educated on pressure relief and weight shifting while seated on BSC. RN called to room due to wound vac alarming. Pt tolerated sitting on BSC well, left with PT for next session. Cont OT POC. Second session: Pt sleeping in bed, wound care RN in room wrapping foot, reports will come back for wound vac after OT/PT cotx session. Cotx is indicated due to increased need for x2A skilled hands and increased progress towards goals with both disciplines vs 1:1 session. Pt was agreeable with POC on using herb plus for sit <> stand and then trialing slide board vs sit pivot however limited by low BP and s/s sitting vs standing. BP supine 90s/50s, sitting EOB 80s/50's dropping to lowest at 76/49.   Pt is symptomatic, maxA x2 sit <> Stand to steady EOB to recliner but unable to tolerate sitting in recliner due to low BP therefore maxi-move from recliner to bed. Pt educated on ROM for BUE/LE exercises, repositioning/pain management strategies in bed, and cervical ROM due to neck pain. Spoke with CM on OT/PT recommendations of SNF for further therapy due to patients slow progress towards goals, 4/29 DC from [facility] and still remains x2A with lift equipment for minimal transfers. Family training is scheduled tomorrow 4/26. Cont OT POC    Activity Tolerance: Patient limited by fatigue;Patient limited by endurance; Patient limited by pain  Discharge Recommendations: Continue to assess pending progress; Patient would benefit from continued therapy after discharge  OT Equipment Recommendations  Equipment Needed: Yes  Mobility Devices: ADL Assistive Devices  ADL Assistive Devices: Grab Bars - shower;Transfer Tub Bench  Other: CTA pending progress  Safety Devices  Safety Devices in place: Yes  Type of devices: Patient at risk for falls;Gait belt; All fall risk precautions in place (left with PT)  Restraints  Initially in place: No    Patient Education  Education  Education Given To: Patient; Family  Education Provided: ADL Function;Role of Therapy;Plan of Care;Equipment;Home Exercise Program;Precautions; Safety; Energy Conservation; Family Education  Education Method: Demonstration;Verbal;Teach Back  Barriers to Learning: None  Education Outcome: Verbalized understanding;Demonstrated understanding;Continued education needed    Plan  Plan  Times per Week: 5-7x per week  Times per Day: Daily  Current Treatment Recommendations: Strengthening;ROM;Balance training;Functional mobility training; Endurance training;Pain management; Safety education & training;Patient/Caregiver education & training;Positioning;Self-Care / ADL;Neuromuscular re-education; Wheelchair mobility training;Equipment evaluation, education, & procurement    Goals  Patient Goals   Patient goals : 1 week (4/21)-EXTEND TO 4/27  Short Term Goals  Time Frame for Short term goals: Pt will complete UB dressing with min A- GOAL MET; Pt SBA for UB dressing at EOB  Short Term Goal 1: Pt will complete LB dressing with mod A  Short Term Goal 2: Pt will complete LB bathing with mod A  Short Term Goal 3: Pt will complete UB bathing with SBA  Short Term Goal 4: Pt will complete 3 grooming task at w/c level  Short Term Goal 5: Pt will complete x20 reps of BUE HEP to improve UB strength/endurance for repositioning and UB ADLs- GOAL MET 4/20;  Pt completing x20 BUE AROM  Long Term Goals  Time Frame for Long term goals : 3 weeks (5/05)  Long Term Goal 1: Pt will complete total body dressing with supv  Long Term Goal 2: Pt will complete total body bathing with supv  Long Term Goal 3: Pt will complete 3 grooming tasks at sink with mod I  Long Term Goal 4: Pt will perform functional transfers with mod I    Therapy Time   Individual Concurrent Group Co-treatment   Time In 0930         Time Out 1000         Minutes 30         Timed Code Treatment Minutes: 30 Minutes     Second Session Therapy Time:   Individual Concurrent Group Co-treatment   Time In      1230   Time Out      1330   Minutes      60     Timed Code Treatment Minutes:  60 Minutes    Total Treatment Minutes:  [address] Robyn Thrasher, OTR/L

## 2022-04-25 NOTE — PROGRESS NOTES
Laquita Ramp  4/25/2022  [id number]    Chief Complaint: Critical illness myopathy    Subjective:   No acute events overnight. Today Sridhar Bowman is seen in his room, resting in bed. He reports feeling fatigued. He reports that his pain is currently controlled. He denies other acute complaints at this time. ROS: denies f/c, sob, cp    Objective:  Patient Vitals for the past 24 hrs:   BP Temp Temp src Pulse Resp SpO2   04/25/22 0839 104/68 97.8 °F (36.6 °C) Oral 83 16 96 %   04/24/22 2000 123/81 98.1 °F (36.7 °C) Oral 86 17 95 %     Gen: No distress, pleasant. Resting in bed   HEENT: Normocephalic, atraumatic  CV: Regular rate and rhythm   Resp: No respiratory distress. CTAB  Abd: Soft, nontender, nondistended  Ext: No edema  Skin: W[place] to sacrum and L foot with appropriate seal and minimal output  Neuro: Alert, oriented, appropriately interactive.      Wt Readings from Last 3 Encounters:   04/13/22 236 lb 1.8 oz (107.1 kg)   03/10/22 255 lb 1.6 oz (115.7 kg)   10/14/21 282 lb (127.9 kg)       Laboratory data:   Lab Results   Component Value Date    WBC 10.2 04/25/2022    HGB 8.8 (L) 04/25/2022    HCT 24.9 (L) 04/25/2022    MCV 93.7 04/25/2022     04/25/2022       Lab Results   Component Value Date     04/25/2022    K 4.9 04/25/2022    CL 93 04/25/2022    CO2 29 04/25/2022    BUN 34 04/25/2022    CREATININE 1.6 04/25/2022    GLUCOSE 197 04/25/2022    CALCIUM 10.5 04/25/2022        Therapy progress:  PT  Position Activity Restriction  Other position/activity restrictions: RUE PICC, sacral wound vac  Objective     Sit to Stand: Maximum Assistance,Dependent/Total,2 Person Assistance  Stand to sit: Maximum Assistance,Dependent/Total,2 Person Assistance  Bed to Chair: Dependent/Total     OT  PT Equipment Recommendations  Other: TBD pending progress and increasead functional mobility assessment; likely w/c     Assessment        SLP  Current Diet : Regular  Current Liquid Diet : Thin  Diet Solids Recommendation: Regular  Liquid Consistency Recommendation: Thin    Body mass index is 32.02 kg/m². Assessment and Plan:  David Lentz is a 42year old male with a past medical history significant for DM2, diabetic foot ulcer with multiple amputations, HFrEF who has had a prolonged hospital course following Covid pneumonia with complications including respiratory failure, GIB, and multiple infections.  He was admitted to [facility] on 4/13/22 due to functional deficits below his baseline.      Critical Illness Myopathy  - due to covid pneumonia  - PT, OT, ST     MSSA bactermia  - with left foot abscess, left hand abscess, osteomyelitis, RUL abscess  - transitioned to oral doxycycline to continue through 4/28     Multiple wounds POA  - wound vac to sacral wound and left foot wound  - wound care     HFrEF  - EF 35%  - hold lasix 40 mg BID due to RODRICK  - electrolyte replacement  - daily weights - ordered, Discussed with RN manager     RODRICK on CKD  - RODRICK due to sepsis, cardiac arrest. Required dialysis during acute stay  - Cr elevated at 1.6, has been in 1-1.2 range  - hold lasix and resume wed  - repeat BMP tomorrow     Atrial Fibrillation  - BB  - AC contraindicated due to bleeding risk    HTN  - lopressor 12.5 mg BID  - discontinue lisinopril     DM2 complicated by neuropathy  - increased lantus to 25, added prandial 4U, SSI  - home regimen lantus 75, prandial 15, SSI     Diabetic Neuropathy  - gabapentin 400 TID     Acute blood loss anemia  - recent GIB s/p embolization of gastroduodenal artery on 2/25 and ex-lap  - monitor and transfuse for Hb<7     History of GIB  - PPI     Nausea  - PRN zofran and compazine  - started reglan     Urinary Retention  - flomax  - continue voiding trial, ICs per protocol  - place condom cath to prevent contamination of sacral wound if incontinence     Chronic Pain  - dilaudid 2 mg q6 hours, wean as able  - oxycodone PRN     Bowel: Per protocol  Bladder: Per protocol  Sleep: Trazodone PRN  Pain: neurontin 400, dilaudid PO 2 Q6H scheduled, tizanidine 8 BID.  Tylenol and cy 5-10 PRN  DVT PPx: heparin  JARRETT: 4/29     Follow up appointments: PCP, Cardiology, wound care    Electronically signed by Bimal De León MD on 4/25/2022 at 3:01 PM

## 2022-04-25 NOTE — PROGRESS NOTES
[facility] Nurse  Follow-up Progress Note       NAME:  David Peoples  MEDICAL RECORD NUMBER:  [id number]  AGE:  42 y.o. GENDER:  male  :  1975  TODAY'S DATE:  2022    Subjective: Totally worn out today. Wound Identification:  Wound Type:  traumatic left foot. Healing stage 4 pressure injury coccyx/sacrum. Contributing Factors:  edema, diabetes, chronic pressure, decreased mobility, shear force, obesity, incontinence of stool and incontinence of urine        Patient Goal of Care:  [x] Wound Healing  [] Odor Control   [] Palliative Care  [] Pain Control   [] Other:     Objective:  Lying in bed. Wife at bed side. /68   Pulse 83   Temp 97.8 °F (36.6 °C) (Oral)   Resp 16   Ht 6' (1.829 m)   Wt 236 lb 1.8 oz (107.1 kg)   SpO2 96%   BMI 32.02 kg/m²   Shimon Risk Score: Shimon Scale Score: 15  Assessment: Left foot granulation tissue present. Treating this wound will start Moist to Moist dressings bid. Coccyx wound continues to improve. Smaller sponge needed in wound bed. See photo today. Wife observed wound and pleased to see progression toward healing   Measurements:  Negative Pressure Wound Therapy Coccyx (Active)   $ Standard NPWT <=50 sq cm PER TX $ Yes 22 1704   $ Standard NPWT >50 sq cm PER TX $ Yes 22 1108   Wound Type Pressure ulcer: Stage IV;Surgical 22 1444   Unit Type [employer] rental [id number] 22 1444   Dressing Type Black Foam 22 1444   Number of pieces used 2 22 1444   Cycle Continuous 22 1444   Target Pressure (mmHg) 125 22 1444   Intensity 1 22 1444   Canister changed?  No 22 1444   Dressing Status New dressing applied 22 1444   Dressing Changed Changed/New 22 1444   Drainage Amount Small 22 1444   Drainage Description Serosanguinous 22 1444   Dressing Change Due 22 1444   Output (ml) 50 ml 22 1704   Wound Assessment Granulation tissue 04/25/22 1444   Shape heart shaped 04/25/22 1444   Odor None 04/25/22 1444   Number of days: 82       Wound 01/24/22 Foot Left;Dorsal I & D to left dorsal foot by Dr. Adele Rodrigez, surgical wound (Active)   Number of days: 91       Wound 01/30/22 Sacrum SDTI (Active)   Wound Image   04/25/22 1444   Wound Etiology Pressure Stage  4 04/25/22 1444   Dressing Status New dressing applied 04/25/22 1444   Wound Cleansed Irrigated with saline 04/25/22 1444   Dressing/Treatment Barrier film;Hydrocolloid;Non adherent 04/25/22 1444   Offloading for Diabetic Foot Ulcers Offloading ordered 04/25/22 1444   Dressing Change Due 04/27/22 04/25/22 1444   Wound Length (cm) 11.5 cm 04/20/22 1052   Wound Width (cm) 7.5 cm 04/20/22 1052   Wound Depth (cm) 1.4 cm 04/20/22 1052   Wound Surface Area (cm^2) 86.25 cm^2 04/20/22 1052   Change in Wound Size % (l*w) -331.25 04/20/22 1052   Wound Volume (cm^3) 120.75 cm^3 04/20/22 1052   Wound Healing % -788 04/20/22 1052   Distance Tunneling (cm) 0 cm 04/25/22 1444   Tunneling Position ___ O'Clock 0 04/25/22 1444   Undermining Starts ___ O'Clock 0 04/25/22 1444   Undermining Ends___ O'Clock 0 04/25/22 1444   Undermining Maxium Distance (cm) 0 04/25/22 1444   Wound Assessment Granulation tissue;Pink/red 04/25/22 1444   Drainage Amount Small 04/25/22 1444   Drainage Description Serosanguinous 04/25/22 1444   Odor None 04/25/22 1444   Shanita-wound Assessment Dry/flaky; Intact 04/25/22 1444   Margins Attached edges; Defined edges 04/25/22 1444   Wound Thickness Description not for Pressure Injury Partial thickness 04/25/22 1444   Number of days: 85     Mid coccyx wound and right and left buttock:                  Incision 03/08/22 Foot Anterior; Left (Active)   Wound Image   04/20/22 1052   Dressing Status New dressing applied 04/25/22 1444   Dressing Change Due 04/26/22 04/25/22 1444   Incision Cleansed Cleansed with saline 04/25/22 1444   Dressing/Treatment Moisture barrier;Moist to moist 04/25/22 1444   Incision Length (cm) 2 04/20/22 1052   Incision Width (cm) 5 cm 04/20/22 1052   Incision Depth (cm) 0.2 cm 04/20/22 1052   Margins Approximated 04/25/22 1444   Incision Assessment Epithelialization 04/25/22 1444   Drainage Amount Small 04/25/22 1444   Drainage Description Serosanguinous 04/25/22 1444   Odor None 04/25/22 1444   Patricia-incision Assessment Dry/flaky; Intact 04/25/22 1444   Number of days: 48       Response to treatment:  Well tolerated by patient. Pain Assessment:  Severity:  0 / 10  Quality of pain: N/A  Wound Pain Timing/Severity: none  Premedicated: Yes  Plan:   Plan of Care: Wound 01/30/22 Sacrum SDTI-Dressing/Treatment: Elizabeth Ames film,Hydrocolloid,Non adherent  [REMOVED] Wound 02/14/22 Head Left; Lower; Posterior Friction/pressure wound-unstageable. 4/14/22 healed now, will complete LDA per CWOCN-Dressing/Treatment: Open to air  [REMOVED] Wound 01/24/22 Hand Left;Dorsal-Dressing/Treatment: Open to air       Current dressing removed. Cleaned wounds with normal saline. Left foot wound moist to moist dsg applied, dry dressing and secured with roll guaze and tape. One black sponge placed in right and mid coccyx. Left buttocks covered with duoderm dressing. tracked to left hip. Connected to 125 mmHg low continuous suction. Wound care to continue to follow. Call Wound care for problems with seal at [phone] or call [phone] and leave message. Thanks     Recommend:  Clean feet and legs with warm soapy water, foamy cleanser or bath wipes daily.  Pat dry.  Apply aquapor ointment to feet and legs daily. Clean left foot wound with normal saline. Prep patricia wound with zinc paste or barrier film. Apply moist to moist dressing daily (avoid putting dsg on healthy skin)  to left foot. Cover with dry dressing and roll guaze. Continue NPWT dressing to coccyx wound change 3 times a week.       Specialty Bed Required : Yes power pro elite rental mattress  [x] Low Air Loss   [x] Pressure Redistribution  [] Fluid Immersion  [] Bariatric  [] Total Pressure Relief  [] Other:     Current Diet: ADULT DIET; Regular; 4 carb choices (60 gm/meal)  ADULT ORAL NUTRITION SUPPLEMENT; Breakfast, Dinner, Lunch; Diabetic Oral Supplement  Dietician consult:  Yes    Discharge Plan:  Placement for patient upon discharge: unknown at this time  Patient appropriate for Outpatient [address]: Yes    Referrals:  []  following  [] [employer]  [] Supplies  [] Other    Patient/Caregiver Teaching: Updated on wound improvements. Patient shown photo.   Level of patient/caregiver understanding able to:   [] Indicates understanding       [] Needs reinforcement  [] Unsuccessful      [x] Verbal Understanding  [] Demonstrated understanding       [] No evidence of learning  [] Refused teaching         [] N/A       Electronically signed by Anup Martin RN, MSN, Darwin Howard on 4/25/2022 at 2:47 PM

## 2022-04-25 NOTE — PROGRESS NOTES
Physical Therapy  Facility/Department: [facility]  Rehabilitation Physical Therapy Treatment Note    NAME: Han Aden  : 1975 (42 y.o.)  MRN: [id number]  CODE STATUS: Full Code    Date of Service: 22       Restrictions:  Restrictions/Precautions: General Precautions; Fall Risk  Position Activity Restriction  Other position/activity restrictions: RUE PICC, sacral wound vac     SUBJECTIVE  Subjective  Subjective: Pt up to commode with OT at start of session, agreeable to PT tx  Pain: Pt reports 7/10 pain in buttocks, back and hip, RN aware and providing pain medication during session              OBJECTIVE  Cognition  Overall Cognitive Status: WFL  Orientation  Overall Orientation Status: Within Functional Limits    Functional Mobility  Bed mobility  Roll Left  Assistance Level: Minimal assistance  Skilled Clinical Factors: With BR, multiple bouts  Roll Right  Assistance Level: Minimal assistance  Skilled Clinical Factors: with BR, multiple bouts  Scooting  Assistance Level: Dependent; Requires x 2 assistance  Skilled Clinical Factors: maxA x 2 to scoot to [facility]  Standing Balance  Comments: Pt standing at STEDY x 2 reps ~20-40 seconds each with assistance to complete patricia-care, time in standing d/t pt reporting dizziness, unable to attempt further bouts or get BP for safety  Transfers  Surface: Bedside commode  Additional Factors: Hand placement cues; Increased time to complete  Device: Lift equipment (STEDY)  Sit to Stand  Assistance Level: Requires x 2 assistance;Maximum assistance (maxA x 2)  Stand to Sit  Assistance Level: Requires x 2 assistance;Maximum assistance    Therapeutic Exercise:  Supine BLE GS x 15  Supine BLE heel slides x 15  Supine BLE QS x 15  Supine BLE gentle gastroc stretch x 1 minute ea  Supine BLE hip abduction x 15  Intermittent cues for sequence/technique, intermittent rest breaks d/t fatigue.       ASSESSMENT/PROGRESS TOWARDS GOALS       Assessment  Assessment: Pt seen in for individual PT tx, pt up to commode with OT on arrival. Pt reports pain in buttock and back this date with RN aware. Pt completes functional t/f from [facility] to [facility] with grossly maxA x 2 and maxA  x1 to maintain standing balance in stedy up to ~20-40 seconds. Pt reports dizziness with standing and requires returning to bed to complete patricia-care for safety (unable to safely get BP d/t dizzines). Pt is limited by dizziness, decreased strength, balance and activity tolerance. Pt will benefit from continued skilled PT In ARU to address above deficits, will continue to progress mobility as tolerated. Activity Tolerance: Patient limited by fatigue;Patient limited by endurance; Patient limited by pain  Discharge Recommendations: [address] (Anticipate pt will require SNF secondary to high MARIE with mobility)     Addendum: 2nd session:  Pt seen in pm as co-tx with OT secondary to complexity of medical condition, decreased activity tolerance and MARIE for mobility. Pt benefits from x 2 skilled hands to provide increased cues and feedback with mobility and improve safety and performance with functional mobility. Pt significantly limited during session d/t low BP and orthostatic hypotension with BP down to 69/44 following t/f to recliner. Abdominal binder donned with no change and RN notified. With pt reclined in recliner BP 84/52 and 90/53 at end of session. Pt does reports fatigue and dizziness this date. Trialed use of Orval Maribel plus this date for t/f to promote improved safety and WB through [id number] [facility], pt completes with TD (d/t lift equipment), ultimately requires use of skylift for return to bed d/t low BP. Will trial slide board t/f at next tx as able.   Bed mobility:  Supine to sit Luisa/modA, HOB elevated, use of BR  Sit to supine TD with skylift  Transfers:  Pt completes STS from EOB to herb plus TD d/t lift equipment  T/f EOB to chair with herb plus TD d/t lift equipment  T/f recliner to EOB with brianna lift d/t BP  Increased time for t/f, pt demos good WB through BLE with herb plus but limited time before kicking B knees into hyperextension in stance. Balance:  Pt sitting EOB x 25 minutes with grossly SBA, pt reports dizziness seated EOB with BP initially 74/46, pt instructed to complete LAQ, finger flexion/extension and given water while OT gets abdominal binder to don. BP slowly improved back up to 90/56. Limited by fatigue and low BP. Completed to improve tolerance to upright, promote improved core strength and activation for improved performance with t/f and mobility. Pt supine in bed at end of session with bed alarm in place and all needs within reach. RN notified of Low BP and orthostatic hypotension during session.     Goals  Patient Goals   Patient goals : \"to walk again\"  Short Term Goals  Time Frame for Short term goals: 10 days- 4/22/22  Short term goal 1: Pt will complete bed mobility with min A; goal partially met 4/22 - pt completes supine>sit with min A and up to mod A x2 for sit>supine  Short term goal 2: Pt will complete functional transfers with max A x 1 using LRAD; not met 4/22 - pt requires up to max A x2 in // bars and herb stedy  Short term goal 3: Pt will propel manual w/c 50ft with min A; goal met 4/21 - pt propels manual w/c 75 ft with min A  Short term goal 4: Pt will tolerate gait assessment and appropriate LTG to be set; goal not met 4/22 - pt attempts to step but unable to complete successfully to attempt walking  Long Term Goals  Time Frame for Long term goals : 3 weeks- 5/4/22  Long term goal 1: Pt will complete bed mobility with supervision  Long term goal 2: Pt will complete functional transfers with min A using LRAD  Long term goal 3: Pt will propel manual w/c 150ft with mod I    PLAN OF CARE/SAFETY  Plan  Plan:  minutes of therapy at least 5 out of 7 days a week  Plan weeks: 3 weeks  Current Treatment Recommendations: Strengthening;Balance training;Functional mobility training;Transfer training; Endurance training;Gait training;Neuromuscular re-education;Home exercise program;Safety education & training;Patient/Caregiver education & training; Wheelchair mobility training;Equipment evaluation, education, & procurement;Positioning;Manual Therapy - Soft Tissue Mobilization; Therapeutic activities  Safety Devices  Type of Devices: All fall risk precautions in place;Call light within reach;Nurse notified; Patient at risk for falls; Bed alarm in place; Left in bed;Gait belt; Heels elevated for pressure relief          Therapy Time   Individual Concurrent Group Co-treatment   Time In 1000         Time Out 1030         Minutes 30           Timed Code Treatment Minutes: 30 Minutes     Second Session Therapy Time:   Individual Concurrent Group Co-treatment   Time In      1230   Time Out      1330   Minutes      60     Timed Code Treatment Minutes:  60 minutes    Total Treatment Minutes:  90 minutes    Jp Alvarado PT, DPT 04/25/22 at 3:10 PM

## 2022-04-25 NOTE — PLAN OF CARE
Patient is able to feed himself, and is able to set his tray up for himself. Patient does not c/o any nausea currently, and does eat most of his meal at each meal time.   Pedro Mcclain RN

## 2022-04-25 NOTE — CARE COORDINATION
Updates sent to [facility] via review link for .     Sophia Blanchard MPH, RRT  Clinical Liaison [address] [place] [address]  (S)[phone]  (T)[phone]   Electronically signed by Sophia Blanchard on 4/25/2022 at 11:25 AM

## 2022-04-26 NOTE — PATIENT CARE CONFERENCE
[address] [facility]  Inpatient Rehabilitation  Weekly Team Conference Note    Date: 2022  Patient Name: Ashley Chen        MRN: [id number]    : 1975  (42 y.o.)  Gender: male   Referring Practitioner: Bright Wahl MD  Diagnosis: sepsis      Interventions to be utilized toward barriers to discharge, per discipline:  [address] observed barriers to dc: Decreased endurance, Upper extremity weakness, Lower extremity weakness, Impaired vision, Wound Care and Medication management, and Pain  Nursing interventions: Assist with ADLs, medication administration, wound care  Family Education: YES, wife Malathi Mcdowell  Fall Risk:  YES     Physical therapy observed barriers to dc:    Baseline: Lives with wife, two level home, able to stay on first floor, 1-2 DANIE I functional mobility/gait no AD, working full time   Current level: Luisa/modA bed mobility, Luisa to modA x 2 slideboard/sit pivot t/f, maxA x 2 vs lift equipment STS   Barriers to DC: no 24hrA, multiple DANIE, decreased strength, endurance, balance, activity tolerance, increased pain, complexity of medical condition, orthostatic hypotension, equipment needs if d/c home   Needs in order to achieve dc home/next level of care: needs to be A x 1 for bed mobility, t/f and standing to d/c home, will likely need SNF      Physical therapy interventions:   Current Treatment Recommendations: Strengthening,Balance training,Functional mobility training,Transfer training,Endurance training,Gait training,Neuromuscular re-education,Home exercise program,Safety education & training,Patient/Caregiver education & training,Wheelchair mobility training,Equipment evaluation, education, & procurement,Positioning,Manual Therapy - Soft Tissue Mobilization,Therapeutic activities      PHYSICAL THERAPY  PT Equipment Recommendations  Other: TBD pending progress and increasead functional mobility assessment; likely w/c     Pt seen in pm for second PT session, pt is fatigued but pleasant and agreeable and demonstrates good participation in session. Pt's wife present during session for family training. Discussed pt's current MARIE with mobility, pt wife reports she works and cannot provide 24hrA at d/c. Discussed concerns with upcoming d/c date as pt is currently not safe to d/c home and discussed recommendation for continued skilled therapy. Pt has demonstrated progress throughout stay requiring decreased assistance for mobility, demonstrates good participation and motivation to return to prior level of I. Pt is able to complete bed mobility with Luisa, t/f with slideboard with grossly Luisa x 2, and STS with use of herb plus to increase strength and time in standing.  Pt does report seeing \"salt\" and \"ants\" on mat table, RN notified, pt reports he has had problems with vision in past. Pt will benefit from continued skilled PT in ARU to address above deficits      Occupational therapy observed barriers to dc:    Baseline: Lives with spouse, Independent with ADLs/IADLs and mobility, driving              Current level: Dependent for toileting, CGA to setup for UB ADLs, Luisa-CGA  for bed mobility, max Ax2 for transfers with use of Celester Ao              Barriers to DC: generalized weakness, wound recovery, decreased activity tolerance              Needs in order to achieve dc home/next level of care: carryover of compensation techniques, min A for functional transfers, CTA for DME needs; pt will need assistance at home-- wife cannot provide will need SNF if cannot get longer days at ARU    Occupational Therapy interventions:  Current Treatment Recommendations: Strengthening,ROM,Balance training,Functional mobility training,Endurance training,Pain management,Safety education & training,Patient/Caregiver education & training,Positioning,Self-Care / Xavi Grief re-education,Wheelchair mobility training,Equipment evaluation, education, & procurement      OCCUPATIONAL THERAPY  Pt in bed, wife present for family training. Spoke with wife on patients progress vs goals, DC recommendations, DME recommendations at this time. Pt continues to need x2A for all transfers with minimal standing tolerance and only able to tolerate 1-2 transfers a session before fatiguing. Wife inquiring worries about DC home at this time due to consistent need for x2A. OT/PT spoke extensively on all the DC options at this time. Pt was able to complete supine to sit EOB Luisa, mod-maxA X2 scooting laterally EOB to WC. Pt then completed slide board transfer to L side WC to EOM with modA x2 and increased time. Pt then was able to tolerate 1 stand ~15 seconds to herb plus from EOM before having an incontinent BM. Brought to WC then herb plus WC <> BSC to EOB. Pts BP did stay ~90s-100s/50s-60s. Pt is progressing towards goals however very slowly and limited by pain, fatigue, low BP, sitting tolerance, wound vac. Cont to assess pending progress DC placement. Cont OT POC. SPEECH THERAPY   No ST needs for this patient. NUTRITION  Weight: 211 lb 11.2 oz (96 kg) / Body mass index is 28.32 kg/m². Diet Order: ADULT DIET; Regular; 4 carb choices (60 gm/meal)  ADULT ORAL NUTRITION SUPPLEMENT; Breakfast, Dinner, Lunch; Low Calorie/High Protein Oral Supplement  PO Meals Eaten (%): 51 - 75%  Education: Declined     CASE MANAGEMENT  Assessment: 42 yr old male. DX:Critical illness myopathy. Nephrology following>renal US ordered and resulted>normal, IV fluid and urine studies. Therapy recommendations are continue to assess. [facility] following if home is discharge destination if not home [facility] (swing bed) would be an option. Patient has wound vac to coccyx>3M/[facility] referral made for DCP. CM will continue to support for discharge needs.      Interdisciplinary Goals:   1.) Pt will complete total body dressing with supv  2.) Pt will complete bed <> chair t/f with LRAD with modA x 1      Discharge Plan   Estimated discharge date: 5/3/22  Destination: Home w/ 24H assist vs swing bed pending medical needs  Pass:No  Services at Discharge: Home health w/ PT/OT/RN and wound care vs swing bed. Equipment at Discharge: Tub transfer bench, wheel chair, remaining TBD    Team Members Present at Conference:  : Elizabeth Brady    Occupational Therapist: Michelle Patten, OTR/L  Physical Therapist: Te Haynes, KEVAN  Speech Therapist: N/A  Nurse: Shalonda Michael RN  Dietician: Natalie Ozuna RDN, LD  : Tao Chavez, OTR/L  Psychiatry: N/A    Family members present at conference: No    I led this team conference and I approve the established interdisciplinary plan of care as documented within the medical record of Janine Gaucher.     MD: Darion Sawyer MD  04/27/22  11:21 AM

## 2022-04-26 NOTE — PROGRESS NOTES
Comprehensive Nutrition Assessment    Type and Reason for Visit:  Reassess    Nutrition Recommendations/Plan:   1. Continue carb control diet   2. Modify ONS to Ensure High Protein   3. Encourage PO intakes   4. Obtain updated weight  5. Monitor nutrition adequacy, pertinent labs, bowel habits, wt changes, and clinical progress     Malnutrition Assessment:  Malnutrition Status: Moderate malnutrition (04/14/22 1528)    Context:  Chronic Illness     Findings of the 6 clinical characteristics of malnutrition:  Energy Intake:  7 - 75% or less estimated energy requirements for 1 month or longer  Weight Loss:  7 - 7.5% over 3 months       Nutrition Assessment:    Follow up: Pt nutritionally improving AEB PO intakes of mainly %. Pt reports appetite remains fair, but eating most of his meals. Encouraged PO intakse for wound healing. Agreeable to modify ONS to lower carb, higher protein option and also has less potassium. No further nutrition questions or concerns at this time. Will monitor. Nutrition Related Findings:    Active BS. BM x1 on 4/25. Trrace LLE edema. Na 135. K 5.3. -202 past 24 hrs. Wound Type: Stage IV,Pressure Injury,Surgical Incision,Burns,Wound Vac       Current Nutrition Intake & Therapies:    Average Meal Intake: 26-50%,51-75%,%  Average Supplements Intake: %  ADULT DIET; Regular; 4 carb choices (60 gm/meal)  ADULT ORAL NUTRITION SUPPLEMENT; Breakfast, Dinner, Lunch; Diabetic Oral Supplement    Anthropometric Measures:  Height: 6' (182.9 cm)  Ideal Body Weight (IBW): 178 lbs (81 kg)       Current Body Weight: 236 lb 1.8 oz (107.1 kg),   IBW. Weight Source: Bed Scale  Current BMI (kg/m2): 32                          BMI Categories: Obese Class 1 (BMI 30.0-34. 9)    Estimated Daily Nutrient Needs:  Energy Requirements Based On: Kcal/kg  Weight Used for Energy Requirements: Ideal (80.9 kg)  Energy (kcal/day): 2787-7653  Weight Used for Protein Requirements: Ideal  Protein (g/day): 80-97  Method Used for Fluid Requirements: 1 ml/kcal    Nutrition Diagnosis:   · Moderate malnutrition related to inadequate protein-energy intake,pain as evidenced by Criteria as identified in malnutrition assessment      Nutrition Interventions:   Food and/or Nutrient Delivery: Continue Current Diet,Modify Oral Nutrition Supplement  Nutrition Education/Counseling: Education declined  Coordination of Nutrition Care: Continue to monitor while inpatient,Interdisciplinary Rounds  Plan of Care discussed with: Patient    Goals:  Previous Goal Met: Progressing toward Goal(s)  Goals: Meet at least 75% of estimated needs,prior to discharge       Nutrition Monitoring and Evaluation:   Behavioral-Environmental Outcomes: None Identified  Food/Nutrient Intake Outcomes: Food and Nutrient Intake,Supplement Intake  Physical Signs/Symptoms Outcomes: Biochemical Data,Nutrition Focused Physical Findings,Weight,Skin,Diarrhea    Discharge Planning:    Continue current diet,Continue Oral Nutrition Supplement     Cindy Riley MS, [address],   Contact: [phone]

## 2022-04-26 NOTE — PROGRESS NOTES
Physical Therapy  Facility/Department: [facility]  Rehabilitation Physical Therapy Treatment Note    NAME: Carol Bhandari  : 1975 (42 y.o.)  MRN: [id number]  CODE STATUS: Full Code    Date of Service: 22       Restrictions:  Restrictions/Precautions: General Precautions; Fall Risk  Position Activity Restriction  Other position/activity restrictions: RUE PICC, sacral wound vac     SUBJECTIVE  Subjective  Subjective: Pt supine in bed on approach, agreeable to PT tx  Pain: Pt reports pain 7-8/10 in buttocks, back and hip, RN aware and providing pain medication during session               OBJECTIVE  Orientation  Overall Orientation Status: Within Functional Limits    Functional Mobility  Roll Left  Assistance Level: Stand by assist  Skilled Clinical Factors: With BR, multiple bouts  Roll Right  Assistance Level: Stand by assist  Skilled Clinical Factors: with BR, multiple bouts  Supine to Sit  Assistance Level: Minimal assistance  Skilled Clinical Factors: HOB slightly elevated, use of BR, increased time to complete, cues for sequence  Scooting  Assistance Level: Dependent; Requires x 2 assistance  Skilled Clinical Factors: maxA x 2 to scoot to [facility]  Balance  Sitting Balance: Stand by assistance  Standing Balance: Dependent/Total (TD with herb plus)  Standing Balance  Time: 1 minute  Comments: TD with herb plus, maintains x 1 minute without overt knee buckling or LOB. Pt denies dizziness this date  Transfers  Surface: From bed; To chair with arms  Additional Factors: Hand placement cues; Increased time to complete  Device: Lift equipment Chantel Hazel plus)  Sit to Stand  Assistance Level: Dependent  Skilled Clinical Factors: TD d/t lift equipment EOB to chair                      ASSESSMENT/PROGRESS TOWARDS GOALS       Assessment  Assessment: Pt seen in am for individual PT session with focus on bed mobility, sitting balance and t/f with use of herb plus this date.  Pt with improved BP, minimal reports of dizziness with initial supine to sit but improved with no further complaints during session. Pt requires SBA for rolling, Luisa for supine to sit and TD with use of herb plus for t/f to recliner. Pt is limited by pain, decreased strength, balance and activity tolerance. Pt will benefit from continued skilled PT in ARU to address above deficits, will continue to progress mobility as tolerated. Activity Tolerance: Patient limited by fatigue;Patient limited by endurance; Patient limited by pain  Discharge Recommendations: Subacute/Skilled Nursing Facility      Addendum: 2nd session:  Pt seen in pm for second PT session, pt is fatigued but pleasant and agreeable and demonstrates good participation in session. Pt's wife present during session for family training. Discussed pt's current MARIE with mobility, pt wife reports she works and cannot provide 24hrA at d/c. Discussed concerns with upcoming d/c date as pt is currently not safe to d/c home and discussed recommendation for continued skilled therapy. Pt has demonstrated progress throughout stay requiring decreased assistance for mobility, demonstrates good participation and motivation to return to prior level of I. Pt is able to complete bed mobility with Luisa, t/f with slideboard with grossly Luisa x 2, and STS with use of herb plus to increase strength and time in standing. Pt does report seeing \"salt\" and \"ants\" on mat table, RN notified, pt reports he has had problems with vision in past. Pt will benefit from continued skilled PT in ARU to address above deficits.   Bed mobility:  B rolling with BR SBA  Supine to sit with HOB elevated use of BR Luisa/modA  Sit to supine, HOB slightly elevated use of BR Luisa/modA  Cues for sequence and hand placement, increased time to complete  Transfers:  Sit pivot t/f EOB to w/c Luisa/modA x 2  Sit pivot t/f w/c to EOM with slideboard Luisa/modA x 2  T/f EOM to w/c, w/c to commode and commode to EOB with Claudelvira Bryant plus (TD d/t herb plus)   Cues for hand placement/sequence, cues for positioning with slide board and completing scoot to decrease shearing forces. Education provided to pt's wife regarding use of slideboard for safety vs lift equipment currently. Balance:  Pt sitting EOB with grossly SBA with UE support  Pt completes x 4 reps static standing with use of herb plus, completed x 4 reps up to 2 minutes to improve strength, activity tolerance, tolerance to upright and promote WB to BLE/BUE for progression of mobility. Pt is limited by fatigue/weakness and reports of dizziness towards EOS. Seated rest break between bouts. PT assisting with balance with OT completing patricia-care and clothing management. Pt supine in bed at end of session with bed alarm in place and all needs within reach.     Goals  Patient Goals   Patient goals : \"to walk again\"  Short Term Goals  Time Frame for Short term goals: 10 days- 4/22/22  Short term goal 1: Pt will complete bed mobility with min A; goal partially met 4/22 - pt completes supine>sit with min A and up to mod A x2 for sit>supine  Short term goal 2: Pt will complete functional transfers with max A x 1 using LRAD; not met 4/22 - pt requires up to max A x2 in // bars and herb stedy  Short term goal 3: Pt will propel manual w/c 50ft with min A; goal met 4/21 - pt propels manual w/c 75 ft with min A  Short term goal 4: Pt will tolerate gait assessment and appropriate LTG to be set; goal not met 4/22 - pt attempts to step but unable to complete successfully to attempt walking  Long Term Goals  Time Frame for Long term goals : 3 weeks- 5/4/22  Long term goal 1: Pt will complete bed mobility with supervision  Long term goal 2: Pt will complete functional transfers with min A using LRAD  Long term goal 3: Pt will propel manual w/c 150ft with mod I    PLAN OF CARE/SAFETY  Plan  Plan:  minutes of therapy at least 5 out of 7 days a week  Plan weeks: 3 weeks  Current Treatment Recommendations: Strengthening;Balance training;Functional mobility training;Transfer training; Endurance training;Gait training;Neuromuscular re-education;Home exercise program;Safety education & training;Patient/Caregiver education & training; Wheelchair mobility training;Equipment evaluation, education, & procurement;Positioning;Manual Therapy - Soft Tissue Mobilization; Therapeutic activities  Safety Devices  Type of Devices: All fall risk precautions in place;Call light within reach;Nurse notified; Patient at risk for falls; Bed alarm in place;Gait belt; Heels elevated for pressure relief;Left in chair;Chair alarm in place      Therapy Time   Individual Concurrent Group Co-treatment   Time In 0830         Time Out 0900         Minutes 30           Timed Code Treatment Minutes: 30 Minutes       Second Session Therapy Time:   Individual Concurrent Group Co-treatment   Time In      1230   Time Out      1330   Minutes      60     Timed Code Treatment Minutes:  60 minutes    Total Treatment Minutes: 90 minutes      Allison Jeffrey PT, DPT 04/26/22 at 12:28 PM

## 2022-04-26 NOTE — PROGRESS NOTES
Occupational Therapy  Facility/Department: [facility]  Rehabilitation Occupational Therapy Daily Treatment Note    Date: 22  Patient Name: Stephania Louis       Room: 1098/5529-85  MRN: [id number]  Account: [de-identified]   : 1975  (41 y.o.) Gender: male                    Past Medical History:  has a past medical history of Abscess of left foot, Abscess of left hand, Acute encephalopathy, Acute hypoxemic respiratory failure ([address] Acute osteomyelitis of left hand ([address] Acute osteomyelitis of right hallux ([address] Cardiopulmonary arrest ([address] Cellulitis of left foot, CHF (congestive heart failure) ([address] Chronic osteomyelitis of left foot ([address] Closed displaced fracture of distal phalanx of right little finger, Clostridium difficile infection, Diabetic ulcer of left forefoot associated with type 2 diabetes mellitus, with necrosis of bone ([address] Diabetic ulcer of right great toe associated with type 2 diabetes mellitus, with necrosis of bone ([address] Enterococcal infection, 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 Past Surgical History:   has a past surgical history that includes Tonsillectomy; Rogers tooth extraction; Knee arthroscopy (Left, 08/15/2013); Toe amputation (Right, 2019); other surgical history (Left, 2020); Toe amputation (Left, 2020); Toe amputation (Left, 10/22/2020); Foot Debridement (Left, 2022); Arm Surgery (Left, 2022);  IR NONTUNNELED VASCULAR CATHETER > 5 YEARS (02/11/2022); Upper gastrointestinal endoscopy (N/A, 02/17/2022); IR EMBOLIZATION HEMORRHAGE (Right, 02/25/2022); Upper gastrointestinal endoscopy (N/A, 2/27/2022); laparotomy (N/A, 2/27/2022); Foot Debridement (Left, 3/8/2022); Cholecystectomy; Dilatation, esophagus; and Endoscopy, colon, diagnostic. Restrictions  Restrictions/Precautions: General Precautions; Fall Risk  Other position/activity restrictions: RUE PICC, sacral wound vac    Subjective  Subjective: Pt presented supine in bed upon entry and agreeable to OT; stating was up from 830-945, agreeable to BUE ex and then to get to [facility] for FT after lunch. Restrictions/Precautions: General Precautions; Fall Risk      Patient reports 6/10 pain in back/buttocks-- hot pack applied and repositioned for comfort. Objective     Orientation  Overall Orientation Status: Within Functional Limits         ADL  Feeding  Assistance Level: Increased time to complete;Set-up; Stand by assist  Grooming/Oral Hygiene  Assistance Level: Increased time to complete;Supervision;Set-up         OT Exercises  A/AROM Exercises: x20 of the following exercises: shoulder flexion, elbow flexion/ext, wrist flexion/ext, finger flexion/ext following along HEP provided last session (4x5 reps with 1# free weight); pt then used handout and blue theraband to complete 5x5 reps chest press, internal/external rotation, elbow flexion/extension, tricep press, diagonals; rest breaks PRN     Assessment  Assessment  Assessment: Patient remains motivated and pleasant however lethargic throughout needing PRN cues for attn and body mechanics during BUE ex following HEP using 1# free weight-- 15 reps each UE, some exercises depending on difficulty and fatigue completed 3x5 reps. Pt educated on repositoning, rolling in bed, pressure relief, and pain management. Family training will be completed at [id number] session with cotx and wife present.   Left in bed with all needs, hot pack applied to posterior neck for pain management. Cont OT POC. Second session: Pt in bed, wife present for family training. Spoke with wife on patients progress vs goals, DC recommendations, DME recommendations at this time. Pt continues to need x2A for all transfers with minimal standing tolerance and only able to tolerate 1-2 transfers a session before fatiguing. Wife inquiring worries about DC home at this time due to consistent need for x2A. OT/PT spoke extensively on all the DC options at this time. Pt was able to complete supine to sit EOB Luisa, mod-maxA X2 scooting laterally EOB to WC. Pt then completed slide board transfer to L side WC to EOM with modA x2 and increased time. Pt then was able to tolerate 1 stand ~15 seconds to herb plus from EOM before having an incontinent BM. Brought to WC then herb plus WC <> BSC to EOB. Pts BP did stay ~90s-100s/50s-60s. Pt is progressing towards goals however very slowly and limited by pain, fatigue, low BP, sitting tolerance, wound vac. Cont to assess pending progress DC placement. Cont OT POC. Activity Tolerance: Patient limited by fatigue;Patient limited by endurance; Patient limited by pain  Discharge Recommendations: Continue to assess pending progress; Patient would benefit from continued therapy after discharge  OT Equipment Recommendations  Equipment Needed: Yes  Mobility Devices: ADL Assistive Devices  ADL Assistive Devices: Grab Bars - shower;Transfer Tub Bench  Other: CTA pending progress  Safety Devices  Safety Devices in place: Yes  Type of devices: All fall risk precautions in place;Gait belt;Patient at risk for falls;Call light within reach; Left in bed;Bed alarm in place;Nurse notified  Restraints  Initially in place: No    Patient Education  Education  Education Given To: Patient; Family  Education Provided: ADL Function;Role of Therapy;Plan of Care;Equipment;Home Exercise Program;Precautions; Safety; Energy Conservation; Family Education  Education Method: Demonstration;Verbal;Teach Back  Barriers to Learning: None  Education Outcome: Verbalized understanding;Demonstrated understanding;Continued education needed    Plan  Plan  Times per Week: 5-7x per week  Times per Day: Daily  Current Treatment Recommendations: Strengthening;ROM;Balance training;Functional mobility training; Endurance training;Pain management; Safety education & training;Patient/Caregiver education & training;Positioning;Self-Care / ADL;Neuromuscular re-education; Wheelchair mobility training;Equipment evaluation, education, & procurement    Goals  Patient Goals   Patient goals : 1 week (4/21)-EXTEND TO 4/27  Short Term Goals  Time Frame for Short term goals: Pt will complete UB dressing with min A- GOAL MET; Pt SBA for UB dressing at EOB  Short Term Goal 1: Pt will complete LB dressing with mod A  Short Term Goal 2: Pt will complete LB bathing with mod A  Short Term Goal 3: Pt will complete UB bathing with SBA-GOAL MET 4/26  Short Term Goal 4: Pt will complete 3 grooming task at w/c level-GOAL MET 4/26  Short Term Goal 5: Pt will complete x20 reps of BUE HEP to improve UB strength/endurance for repositioning and UB ADLs- GOAL MET 4/20;  Pt completing x20 BUE AROM  Long Term Goals  Time Frame for Long term goals : 3 weeks (5/05)  Long Term Goal 1: Pt will complete total body dressing with supv  Long Term Goal 2: Pt will complete total body bathing with supv  Long Term Goal 3: Pt will complete 3 grooming tasks at sink with mod I  Long Term Goal 4: Pt will perform functional transfers with mod I    Therapy Time   Individual Concurrent Group Co-treatment   Time In 1030         Time Out 1100         Minutes 30         Timed Code Treatment Minutes: 30 Minutes     Second Session Therapy Time:   Individual Concurrent Group Co-treatment   Time In      1230   Time Out      1330   Minutes      60     Timed Code Treatment Minutes:  60 Minutes    Total Treatment Minutes:  90 Minutes  Robyn Thrasher, OTR/L

## 2022-04-26 NOTE — CONSULTS
Nephrology Consult  Note   [facility] [place]      Reason for Consult: RODRICK  Requesting Physician: dr Steven Guillen:      The patient is a 42 y.o. male  with PMH of CHF,recent RODRICK in January 2022 in setting of MSSA sepsis( osteo), s/p cardiac arrest;required RRT until March 2022 , transferred to [id number] [place] [address], now in [facility] for 2 weeks. He is currently sleeping , difficult to wake up. Wife at bedside, admits to poor po intake over the last few days  Creatinine had normalized, started to trend up over the last 3-4 days.   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    Clostridium difficile infection 11/01/2016    Diabetic ulcer of right great toe associated with type 2 diabetes mellitus, with necrosis of bone ([address] 08/2019    Failed soft 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 toe amputation site 10/26/2020    History of hyperbaric oxygen therapy 10/28/2020     DFU- carmichael 3 - compromised flap    Migraine      Possible perforated tympanic membrane       as a result of recurrent otitis    Post-op hematoma of left foot 11/12/2020    Recurrent otitis media      Tear of medial meniscus of left knee      Tobacco use      Toe osteomyelitis, left ([address] 7/24/2020         Past Surgical History:              Procedure Laterality Date    KNEE ARTHROSCOPY Left [id number]     LEFT KNEE ARTHROSCOPY , SYNOVECTOMY       OTHER SURGICAL HISTORY Left 07/24/2020     PARTIAL LEFT FOOT AMPUTATION    TOE AMPUTATION Right 8/23/2019     PARTIAL RIGHT FOOT AMPUTATION performed by Vj Casey DPM at [address] Left 7/24/2020     PARTIAL LEFT FOOT AMPUTATION performed by Vj Casey DPM at [facility] [address] TOE AMPUTATION Left 10/22/2020     PARTIAL LEFT FOOT AMPUTATION WITH GRAFT APPLICATION performed by Jay Jay Gonzales DPM at [address] EXTRACTION             Current Medications:    List reviewed    Allergies:  Januvia [sitagliptin], Metformin and related, Vancomycin, and Mustard oil [allyl isothiocyanate]    Social History:    Wife at bedside    Family History:   No renal disease  REVIEW OF SYSTEMS:    As per HPI, otherwise negative or noncontributory on review of 10 systems  PHYSICAL EXAM:      Vitals: Wt Readings from Last 3 Encounters:   04/26/22 211 lb 11.2 oz (96 kg)   03/10/22 255 lb 1.6 oz (115.7 kg)   10/14/21 282 lb (127.9 kg)     Temp Readings from Last 3 Encounters:   04/26/22 97.8 °F (36.6 °C) (Oral)   03/10/22 98.5 °F (36.9 °C) (Oral)   03/11/21 98.1 °F (36.7 °C)     BP Readings from Last 3 Encounters:   04/26/22 111/63   03/10/22 (!) 175/88   03/08/22 (!) 98/56     Pulse Readings from Last 3 Encounters:   04/26/22 88   03/10/22 106   10/14/21 70     Gen: NAD  HEENT: Normocephalic, atraumatic  CV: Regular rate and rhythm , no rub  Resp: No respiratory distress.  CTAB  Abd: Soft, nontender, nondistended  Ext: No CCE  Skin: [place] to sacrum and L foot   Neuro: non-focal  DATA:      Lab Results   Component Value Date     04/26/2022    K 5.4 04/26/2022    CL 94 04/26/2022    CO2 27 04/26/2022    BUN 42 04/26/2022    CREATININE 1.9 04/26/2022    GLUCOSE 177 04/26/2022    CALCIUM 10.7 04/26/2022        IMPRESSION/RECOMMENDATIONS:    #RODRICK- possibly pre-renal , in setting of poor po intake , hypotension  -check urine studies, renal US( h/o urinary retention , on flomax)  -agree with iv fluids, continue for now  -stopped ACEI, lasix  #H/O recent RODRICK requiring RRT through 3/1/22, creatinine normalized at the time    #Hyperkalemia  -stopped lisinopril  -lokelma, iv fluids    #MSSA bacteremia with left foot abscess, left hand abscess, osteomyelitis, RUL abscess  - oral doxycycline to continue through 4/28   #Multiple wounds   - wound vac to sacral wound and left foot wound  - wound care  #HTN  - off  lisinopril     #DM2   - lantus 25, added prandial 4U, SSI  - home regimen lantus 75, prandial 15, SSI     #Anemia  - recent GIB s/p embolization of gastroduodenal artery on 2/25 and ex-lap  - monitor and transfuse for Hb<7    # H/O Atrial Fibrillation  - on small dose of lopressor  - AC contraindicated due to bleeding risk

## 2022-04-26 NOTE — PLAN OF CARE
Problem: Nutrition Deficit:  Goal: Optimize nutritional status  4/26/2022 1945 by Simón Mills RN  Outcome: Progressing

## 2022-04-26 NOTE — PLAN OF CARE
Problem: Pain:  Goal: Pain level will decrease  Description: Pain level will decrease  Outcome: Progressing  Goal: Control of acute pain  Description: Control of acute pain  Outcome: Progressing  Goal: Control of chronic pain  Description: Control of chronic pain  Outcome: Progressing     Problem: Falls - Risk of:  Goal: Will remain free from falls  Description: Will remain free from falls  Outcome: Progressing  Goal: Absence of physical injury  Description: Absence of physical injury  Outcome: Progressing     Problem: Skin Integrity:  Goal: Will show no infection signs and symptoms  Description: Will show no infection signs and symptoms  Outcome: Progressing  Goal: Absence of new skin breakdown  Description: Absence of new skin breakdown  Outcome: Progressing

## 2022-04-26 NOTE — PROGRESS NOTES
SLP  Current Diet : Regular  Current Liquid Diet : Thin  Diet Solids Recommendation: Regular  Liquid Consistency Recommendation: Thin    Body mass index is 28.32 kg/m². Assessment and Plan:  Mk Solo is a 42year old male with a past medical history significant for DM2, diabetic foot ulcer with multiple amputations, HFrEF who has had a prolonged hospital course following Covid pneumonia with complications including respiratory failure, GIB, and multiple infections.  He was admitted to [facility] on 4/13/22 due to functional deficits below his baseline.      Critical Illness Myopathy  - due to covid pneumonia  - PT, OT, ST     MSSA bactermia  - with left foot abscess, left hand abscess, osteomyelitis, RUL abscess  - transitioned to oral doxycycline to continue through 4/28     Multiple wounds POA  - wound vac to sacral wound and left foot wound  - wound care     HFrEF  - EF 35%  - hold lasix due to RODRICK  - electrolyte replacement  - daily weights - ordered, Discussed with RN manager     RODRICK on CKD  - RODRICK due to sepsis, cardiac arrest. Required dialysis during acute stay  - Cr trending up, has been in 1-1.2 range  - holding lasix  - give gentle fluids as patient has history of HFrEF  - consult nephrology  - daily BMP    Hyperkalemia  - suspect due to kidney dysfunction  - repeat to make sure not further trending up  - Nephro consulted    Atrial Fibrillation  - BB  - AC contraindicated due to bleeding risk    HTN  - lopressor 12.5 mg BID  - discontinued lisinopril     DM2 complicated by neuropathy  - lantus 25, added prandial 4U, SSI  - home regimen lantus 75, prandial 15, SSI     Diabetic Neuropathy  - gabapentin 400 TID     Acute blood loss anemia  - recent GIB s/p embolization of gastroduodenal artery on 2/25 and ex-lap  - monitor and transfuse for Hb<7     History of GIB  - PPI     Nausea  - PRN zofran and compazine  - reglan     Urinary Retention  - flomax  - continue voiding trial, ICs per protocol  - place condom cath to prevent contamination of sacral wound if incontinence     Chronic Pain  - dilaudid 2 mg q6 hours, wean as able  - oxycodone PRN     Bowel: Per protocol  Bladder: Per protocol  Sleep: Trazodone PRN  Pain: neurontin 400, dilaudid PO 2 Q6H scheduled, tizanidine 8 BID.  Tylenol and cy 5-10 PRN  DVT PPx: heparin  JARRETT: 4/29     Follow up appointments: PCP, Cardiology, wound care    Electronically signed by Adriana Claros MD on 4/26/2022 at 3:19 PM

## 2022-04-27 NOTE — PLAN OF CARE
Problem: Pain:  Goal: Pain level will decrease  Description: Pain level will decrease  Outcome: Progressing  Note: Pt a/o, rates pain appropriately using 0-10 pain scale; pt calls out as needed for pain intervention; will continue to monitor and administer intervention as ordered and requested.      Problem: Pain:  Goal: Control of acute pain  Description: Control of acute pain  Outcome: Progressing        Problem: Falls - Risk of:  Goal: Absence of physical injury  Description: Absence of physical injury  Outcome: Progressing       Problem: Skin Integrity:  Goal: Absence of new skin breakdown  Description: Absence of new skin breakdown  Outcome: Progressing     Problem: Nutrition  Goal: Optimal nutrition therapy  Outcome: Progressing

## 2022-04-27 NOTE — PROGRESS NOTES
Ashley Chen  4/27/2022  [id number]    Chief Complaint: Critical illness myopathy    Subjective:   No acute events overnight. Today Linsey Jenkins is seen in his room with his wife present. He appears more fatigued today. Per wife he has been having some hallucinations. CBC without leukocytosis. Obtain UA. Decreased scheduled pain medication dose. He denies acute complaints at this time. ROS: denies f/c, sob, cp    Objective:  Patient Vitals for the past 24 hrs:   BP Temp Temp src Pulse Resp SpO2   04/27/22 1405 106/61   79  93 %   04/27/22 1230 106/61   79  93 %   04/27/22 0905     16    04/27/22 0745 109/75 97.8 °F (36.6 °C) Oral 99 16 95 %   04/27/22 0642     18    04/27/22 0000 100/65 97.7 °F (36.5 °C) Oral 82 18    04/26/22 2200     16    04/26/22 2030 104/68        04/26/22 2012 97/63 97.4 °F (36.3 °C) Oral 82 16 92 %     Gen: No distress, pleasant. Supine in bed, appears encephalopathic  HEENT: Normocephalic, atraumatic  CV: Regular rate and rhythm   Resp: No respiratory distress. CTAB  Abd: Soft, nontender, nondistended  Ext: No edema  Skin: WV to sacrum and L foot with appropriate seal   Neuro: Alert, oriented, appropriately interactive.  Speech fluent without aphasia    Wt Readings from Last 3 Encounters:   04/26/22 211 lb 11.2 oz (96 kg)   03/10/22 255 lb 1.6 oz (115.7 kg)   10/14/21 282 lb (127.9 kg)       Laboratory data:   Lab Results   Component Value Date    WBC 10.7 04/27/2022    HGB 8.6 (L) 04/27/2022    HCT 25.3 (L) 04/27/2022    MCV 94.9 04/27/2022     04/27/2022       Lab Results   Component Value Date     04/27/2022    K 4.9 04/27/2022    CL 96 04/27/2022    CO2 26 04/27/2022    BUN 38 04/27/2022    CREATININE 1.8 04/27/2022    GLUCOSE 145 04/27/2022    CALCIUM 10.8 04/27/2022        Therapy progress:  PT  Position Activity Restriction  Other position/activity restrictions: RUE PICC, sacral wound vac  Objective     Sit to Stand: Maximum Assistance,Dependent/Total,2 Person Assistance  Stand to sit: Maximum Assistance,Dependent/Total,2 Person Assistance  Bed to Chair: Dependent/Total     OT  PT Equipment Recommendations  Other: TBD pending progress and increasead functional mobility assessment; likely w/c     Assessment        SLP  Current Diet : Regular  Current Liquid Diet : Thin  Diet Solids Recommendation: Regular  Liquid Consistency Recommendation: Thin    Body mass index is 28.32 kg/m². Assessment and Plan:  Moiz Maguire is a 42year old male with a past medical history significant for DM2, diabetic foot ulcer with multiple amputations, HFrEF who has had a prolonged hospital course following Covid pneumonia with complications including respiratory failure, GIB, and multiple infections. He was admitted to [facility] on 4/13/22 due to functional deficits below his baseline.      Critical Illness Myopathy  - due to covid pneumonia  - PT, OT, ST     MSSA bactermia  - with left foot abscess, left hand abscess, osteomyelitis, RUL abscess  - transitioned to oral doxycycline to continue through 4/28     Multiple wounds POA  - wound vac to sacral wound and left foot wound  - wound care    Encephalopathy  - etiology unclear  - CBC without leukocytosis  - obtain UA  - decrease scheduled dilaudid     HFrEF  - EF 35%  - hold lasix due to RODRICK  - electrolyte replacement  - daily weights - ordered, Discussed with RN manager     RODRICK on CKD  - RODRICK due to sepsis, cardiac arrest. Required dialysis during acute stay  - Cr trended up, now stable.  Previously been in 1-1.2 range  - holding lasix  - give gentle fluids as patient has history of HFrEF  - Nephrology following, appreciate assistance  - daily BMP    Hyperkalemia  - suspect due to kidney dysfunction  - s/p lokemia 4/26  - Nephro following    Atrial Fibrillation  - BB  - AC contraindicated due to bleeding risk    HTN  - lopressor 12.5 mg BID  - discontinued lisinopril     DM2 complicated by neuropathy  - lantus 25, added prandial 4U, SSI  - home regimen lantus 75, prandial 15, SSI     Diabetic Neuropathy  - gabapentin renally dosed     Acute blood loss anemia  - recent GIB s/p embolization of gastroduodenal artery on 2/25 and ex-lap  - monitor and transfuse for Hb<7     History of GIB  - PPI     Nausea  - PRN zofran and compazine  - reglan     Urinary Retention  - flomax  - ISC with high volumes, rea replaced  - will need follow up with Urology     Chronic Pain  - dilaudid 1 mg q6 hours, decreased 4/27, continue to wean as able  - oxycodone PRN     Bowel: Per protocol  Bladder: Per protocol  Sleep: Trazodone PRN  Pain: neurontin 400, dilaudid PO 2 Q6H scheduled, tizanidine 8 BID. Tylenol and cy 5-10 PRN  DVT PPx: heparin    Services/DME: home PT, OT, nursing  EDOD: 5/3     Follow up appointments: PCP, Cardiology, wound care    Interdisciplinary team conference was held today with entire rehab treatment team including PT, OT, SLP, Dietician, RN, and SW. Discussion focused on progress toward rehab goals and discharge planning. Barriers: RODRICK, encephalopathy, comorbidities, level of assistance. Total treatment time >35 min with greater than 50% spent in care coordination.       Electronically signed by Bimal De León MD on 4/27/2022 at 2:32 PM

## 2022-04-27 NOTE — PROGRESS NOTES
Occupational Therapy  Facility/Department: [address] [place] [address]  Rehabilitation Occupational Therapy Daily Treatment Note    Date: 22  Patient Name: Tiffanie Proctor       Room: 99/9159-21  MRN: [id number]  Account: [de-identified]   : 1975  (41 y.o.) Gender: male                    Past Medical History:  has a past medical history of Abscess of left foot, Abscess of left hand, Acute encephalopathy, Acute hypoxemic respiratory failure ([address] Acute osteomyelitis of left hand ([address] Acute osteomyelitis of right hallux ([address] Cardiopulmonary arrest ([address] Cellulitis of left foot, CHF (congestive heart failure) ([address] Chronic osteomyelitis of left foot ([address] Closed displaced fracture of distal phalanx of right little finger, Clostridium difficile infection, Diabetic ulcer of left forefoot associated with type 2 diabetes mellitus, with necrosis of bone ([address] Diabetic ulcer of right great toe associated with type 2 diabetes mellitus, with necrosis of bone ([address] Enterococcal infection, 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 Past Surgical History:   has a past surgical history that includes Tonsillectomy; [place] tooth extraction; Knee arthroscopy (Left, 08/15/2013); Toe amputation (Right, 2019); other surgical history (Left, 2020); Toe amputation (Left, 2020); Toe amputation (Left, 10/22/2020); Foot Debridement (Left, 2022); Arm Surgery (Left, 2022);  IR NONTUNNELED VASCULAR CATHETER > 5 YEARS (02/11/2022); Upper gastrointestinal endoscopy (N/A, 02/17/2022); IR EMBOLIZATION HEMORRHAGE (Right, 02/25/2022); Upper gastrointestinal endoscopy (N/A, 2/27/2022); laparotomy (N/A, 2/27/2022); Foot Debridement (Left, 3/8/2022); Cholecystectomy; Dilatation, esophagus; and Endoscopy, colon, diagnostic. Restrictions  Restrictions/Precautions: General Precautions; Fall Risk  Other position/activity restrictions: RUE PICC, sacral wound vac    Subjective  Subjective: Pt in bed, wife present. Agreeable to OT/PT cotx for functional transfer training. Restrictions/Precautions: General Precautions; Fall Risk   Second session: upon arrival, pt complaining of tiredness/fatigue. Pt willing to complete grooming EOB. Pt hallucinating throughout session including seeing blue writing on ground, seeing water spilling all over the ground, and thinking the room is dirty. Pt verbally aggressive with therapist \"you think I'm a red neck hillbilly because I live in this pigsty, its disgusting\" \"did the hospital put on this fucking party and invite my family here with all these presents? \" \"if theres not water on the ground then I'm a fucking psycho. \" Pt oriented and provided verbal feedback on surroundings and current status of room. Given therapeutic presence and calming voice, pt calmed to completed grooming and BUE HEP with support from wife and therapist.           Objective     Cognition  Overall Cognitive Status: Exceptions  Arousal/Alertness: Delayed responses to stimuli  Following Commands: Follows one step commands with repetition; Follows multistep commands with repitition  Attention Span: Attends with cues to redirect  Memory: Decreased recall of precautions;Decreased short term memory  Safety Judgement: Decreased awareness of need for assistance;Decreased awareness of need for safety  Problem Solving: Assistance required to correct errors made;Assistance required to implement solutions;Assistance required to generate solutions;Assistance required to identify errors made  Insights: Fully aware of deficits  Initiation: Requires cues for some  Sequencing: Requires cues for some  Orientation  Overall Orientation Status: Within Functional Limits         ADL  Feeding  Assistance Level: Increased time to complete;Set-up; Stand by assist;Supervision  Grooming/Oral Hygiene  Assistance Level: Increased time to complete;Supervision;Set-up  Upper Extremity Dressing  Assistance Level: Moderate assistance  Lower Extremity Dressing  Assistance Level: Dependent; Requires x 2 assistance  Putting On/Taking Off Footwear  Assistance Level: Increased time to complete; Requires x 2 assistance;Dependent  Toileting  Assistance Level: Requires x 2 assistance;Dependent   Second session:   Grooming- face washing, oral hygiene with set-up EOB> increased time required. Functional Mobility  Activity: To/From therapy gym (EOB to [facility])  Assistance Level: Minimal assistance; Requires x 2 assistance  Skilled Clinical Factors: Luisa x1 sit <> stand from EOB with herb plus; depA for WC mobiltiy room <> gym  Bed Mobility  Overall Assistance Level: Minimal Assistance  Additional Factors: With handrails; Head of bed raised; Increased time to complete;Verbal cues  Bridging  Assistance Level: Minimal assistance  Roll Left  Assistance Level: Minimal assistance  Roll Right  Assistance Level: Minimal assistance  Supine to Sit  Assistance Level: Minimal assistance  Scooting  Assistance Level: Minimal assistance  Transfers  Surface: Wheelchair; To mat;From bed;From mat; To bed  Additional Factors: Increased time to complete; Mat raised; With handrails;Verbal cues; Hand placement cues  Device: Lift equipment (herb plus EOB to WC, sit pivot vs lateral transfer WC <> EOM and then herb plus WC to bed)  Sit to Stand  Assistance Level: Minimal assistance; Moderate assistance; Requires x 2 assistance  Skilled Clinical Factors: Luisa x1 sit <> stand from EOB to herb plus to [facility]; sit pivot Luisa x2 then needing modA x1 Luisa x1 with fatigue scooting WC <> EOM  Stand to Sit  Assistance Level: Minimal assistance; Requires x 2 assistance  Bed To/From Chair  Assistance Level: Minimal assistance; Requires x 2 assistance  Sit Pivot  Assistance Level: Minimal assistance; Moderate assistance; Requires x 2 assistance  Skilled Clinical Factors: Luisa x2 WC <> EOM first trial, Luisa x1 modA x1 second trial       Second Session: supine>EOM min A.   BUE HEP 10 reps x 2 sets with 1# AROM, internal/external rotation, supination/pronation in isometric shoudler flexion, bicep curls, chest press, and shoulder flexion with supinated palms. Assessment  Assessment  Assessment: Patient demonstrating increased progress towards all goals this date- able to complete sit <> stand trials to herb plus with Luisa x1 from EOB, modA x2 from [facility] at end of session 2/2 fatigue. Pt was able to follow commands with increased time and demo's increased unsupported sitting tolerance, BUE strength with lateral transfers, and BLE strength with sit <> stands. Pt was able to complete 4 sit pivot/lateral transfers this date, wife present for training on where to place [facility] and positioning of patient during tasks. Second Session: Pt agitated upon arrival, wife present. Pt agreeable to grooming with set-up (oral hygiene, face washing and shower cap) with min A for rubbing head for 1 minute for hair washing. Pt completed BUE EOB with max fatigue. Pt fatigued and tired, left with PT at end of session. Cont to recommend IPR to optimize progress and independence with ADLs and progress towards safer DC plan. Cont OT POC. Activity Tolerance: Patient limited by fatigue;Patient limited by endurance; Patient limited by pain  Discharge Recommendations: Continue to assess pending progress; Patient would benefit from continued therapy after discharge  OT Equipment Recommendations  Equipment Needed: Yes  Mobility Devices: ADL Assistive Devices  ADL Assistive Devices: Grab Bars - shower;Transfer Tub Bench  Other: CTA pending progress  Safety Devices  Safety Devices in place: Yes  Type of devices: All fall risk precautions in place;Gait belt;Patient at risk for falls;Nurse notified;Call light within reach; Left in bed;Bed alarm in place  Restraints  Initially in place: No    Patient Education  Education  Education Given To: Patient; Family  Education Provided: ADL Function;Role of Therapy;Plan of Care;Equipment;Home Exercise Program;Precautions; Safety; Energy Conservation; Family Education  Education Method: Demonstration;Verbal;Teach Back  Barriers to Learning: None  Education Outcome: Verbalized understanding;Demonstrated understanding;Continued education needed    Plan  Plan  Times per Week: 5-7x per week  Times per Day: Daily  Current Treatment Recommendations: Strengthening;ROM;Balance training;Functional mobility training; Endurance training;Pain management; Safety education & training;Patient/Caregiver education & training;Positioning;Self-Care / ADL;Neuromuscular re-education; Wheelchair mobility training;Equipment evaluation, education, & procurement    Goals  Patient Goals   Patient goals : 1 week (4/21)-EXTEND TO 4/27  Short Term Goals  Time Frame for Short term goals: Pt will complete UB dressing with min A- GOAL MET; Pt SBA for UB dressing at EOB  Short Term Goal 1: Pt will complete LB dressing with mod A  Short Term Goal 2: Pt will complete LB bathing with mod A-GOAL MET 4/26  Short Term Goal 3: Pt will complete UB bathing with SBA-GOAL MET 4/26  Short Term Goal 4: Pt will complete 3 grooming task at w/c level-GOAL MET 4/26  Short Term Goal 5: Pt will complete x20 reps of BUE HEP to improve UB strength/endurance for repositioning and UB ADLs- GOAL MET 4/20;  Pt completing x20 BUE AROM  Additional Goals?: No  Long Term Goals  Time Frame for Long term goals : 3 weeks (5/05)  Long Term Goal 1: Pt will complete total body dressing with supv  Long Term Goal 2: Pt will complete total body bathing with supv  Long Term Goal 3: Pt will complete 3 grooming tasks at sink with mod I  Long Term Goal 4: Pt will perform functional transfers with mod I    Therapy Time   Individual Concurrent Group Co-treatment   Time In       0900   Time Out       1000   Minutes       60   Timed Code Treatment Minutes: 60 Minutes    Second Session Therapy Time:   Individual Concurrent Group Co-treatment   Time In 1300        Time Out 1330        Minutes 30          Timed Code Treatment Minutes:  30 Minutes    Total Treatment Minutes:  90 minutes  SHARA Martínez OTR/L

## 2022-04-27 NOTE — PROGRESS NOTES
Nephrology Progress  Note   [email or web address]       CC:RODRICK  HPI  The patient is a 42 y.o. male  with PMH of CHF,recent RODRICK in January 2022 in setting of MSSA sepsis( osteo), s/p cardiac arrest;required RRT until March 2022 , transferred to [address] [place] [address], now in [address] [place] [address] for 2 weeks. He is currently sleeping , difficult to wake up. Wife at bedside, admits to poor po intake over the last few days  Creatinine had normalized, started to trend up over the last 3-4 days. BP on the low side  Was on lisinopril and lasix, now stopped  On iv fluids now, creatinine slightly better    SUBJECTIVE  More awake but still off baseline, per wife.   No CP or SOB  Not eating much    SOC: wife at bedside        Scheduled Meds:   traZODone  100 mg Oral Nightly    gabapentin  100 mg Oral TID    metoprolol tartrate  12.5 mg Oral BID    mineral oil-hydrophilic petrolatum   Topical Daily    insulin lispro  0-18 Units SubCUTAneous TID WC    insulin lispro  0-9 Units SubCUTAneous Nightly    insulin lispro  4 Units SubCUTAneous TID WC    insulin glargine  25 Units SubCUTAneous Nightly    metoclopramide  10 mg Oral 4x Daily AC & HS    HYDROmorphone  2 mg Oral Q6H WA    doxycycline hyclate  100 mg Oral BID    fluticasone  2 spray Each Nostril Nightly    heparin (porcine)  5,000 Units SubCUTAneous 3 times per day    magnesium oxide  800 mg Oral TID    OLANZapine  5 mg Oral Nightly    pantoprazole  40 mg Oral BID    PARoxetine  20 mg Oral Daily    multivitamin  1 tablet Oral Daily    senna  2 tablet Oral BID    tamsulosin  0.4 mg Oral Nightly    tiZANidine  8 mg Oral BID     Continuous Infusions:   sodium chloride 100 mL/hr at 04/26/22 2343    dextrose       PRN Meds:oxyCODONE, oxyCODONE, prochlorperazine, ondansetron, ondansetron, hydrALAZINE, acetaminophen, diclofenac sodium, glucose, glucagon (rDNA), dextrose, bisacodyl, aluminum & magnesium hydroxide-simethicone, polyethylene glycol, dextrose bolus (hypoglycemia) **OR** dextrose bolus (hypoglycemia)      Objective:      Physical Exam  Wt Readings from Last 3 Encounters:   04/26/22 211 lb 11.2 oz (96 kg)   03/10/22 255 lb 1.6 oz (115.7 kg)   10/14/21 282 lb (127.9 kg)     Temp Readings from Last 3 Encounters:   04/27/22 97.8 °F (36.6 °C) (Oral)   03/10/22 98.5 °F (36.9 °C) (Oral)   03/11/21 98.1 °F (36.7 °C)     BP Readings from Last 3 Encounters:   04/27/22 109/75   03/10/22 (!) 175/88   03/08/22 (!) 98/56     Pulse Readings from Last 3 Encounters:   04/27/22 99   03/10/22 106   10/14/21 70   Gen: NAD  HEENT: Normocephalic, atraumatic  CV: Regular rate and rhythm , no rub  Resp: No respiratory distress.  CTAB  Abd: Soft, nontender, nondistended  Ext: No CCE  Skin: WV to sacrum and L foot   Neuro: non-focal        Lab Review   Lab Results   Component Value Date    WBC 10.2 04/25/2022    HGB 8.8 (L) 04/25/2022    HCT 24.9 (L) 04/25/2022    MCV 93.7 04/25/2022     04/25/2022     Lab Results   Component Value Date     04/27/2022    K 4.9 04/27/2022    CL 96 04/27/2022    CO2 26 04/27/2022    BUN 38 04/27/2022    CREATININE 1.8 04/27/2022    GLUCOSE 145 04/27/2022    CALCIUM 10.8 04/27/2022              Patient Active Problem List    Diagnosis Date Noted    Moderate malnutrition ([address] 04/14/2022    Critical illness myopathy 04/13/2022    MSSA bacteremia     ABLA (acute blood loss anemia)     Gastrointestinal hemorrhage     Probable abscess of upper lobe of right lung without pneumonia ([address]     Diabetic ulcer of left midfoot associated with type 2 diabetes mellitus, with necrosis of bone ([address] 02/21/2022    Duodenal ulcer 02/17/2022    Paroxysmal atrial fibrillation (HCC)     Severe protein-calorie malnutrition ([address] 02/08/2022    Pressure injury of deep tissue of sacral region 02/07/2022    Antibiotic-associated diarrhea 02/01/2022    Staph aureus infection 01/23/2022    Third degree burn of left hand 01/23/2022    Acute renal failure ([address] 01/22/2022    Acute osteomyelitis of left foot ([facility] 10/26/2020    Allergic rhinitis 07/26/2020    Osteoarthritis     Mixed hyperlipidemia 04/26/2016    Cardiomyopathy ([facility] 09/11/2014    Hypertension     Uncontrolled type 2 diabetes mellitus with diabetic polyneuropathy ([facility]        ASSESSMENT AND PLAN   #RODRICK- possibly pre-renal , in setting of poor po intake , hypotension  -continue iv fluids  -stopped ACEI, lasix  #H/O recent RODRICK requiring RRT through 3/1/22, creatinine normalized at the time     #Hyperkalemia  -stopped lisinopril  -lokelma, iv fluids     #MSSA bacteremia with left foot abscess, left hand abscess, osteomyelitis, RUL abscess  - oral doxycycline to continue through 4/28     #Multiple wounds   - wound vac to sacral wound and left foot wound  - wound care  #HTN  - off  lisinopril     #DM2   - lantus 25, added prandial 4U, SSI  - home regimen lantus 75, prandial 15, SSI     #Anemia  - recent GIB s/p embolization of gastroduodenal artery on 2/25 and ex-lap  - monitor and transfuse for Hb<7     # H/O Atrial Fibrillation  - on small dose of lopressor  - AC contraindicated due to bleeding risk    #MS changes; not likely to be due to RODRICK  -decreased the Neurontin dose to 100 mg every evening

## 2022-04-27 NOTE — CARE COORDINATION
ARU Team Conference       Planned Discharge Date: 05/03/22  Durable medical equipment needed:    Discharge Plan: Wound care following>wound vac to coccyx and Nephrology following for hyperkalemia and RODRICK>fluids and Renal US>normal, lisinopril and lasix D/C. MSSA bacteremia with left foot abscess, left hand abscess, osteomyelitis, RUL abscess> oral doxycycline to continue through 4/28. CM spoke with wife at bedside with update DCP and discharge date. Wife agrees at this time with DCP.  Lord Marifer RN

## 2022-04-27 NOTE — PROGRESS NOTES
Physical Therapy  Facility/Department: [facility]  Rehabilitation Physical Therapy Treatment Note    NAME: Jayme Wan  : 1975 (42 y.o.)  MRN: [id number]  CODE STATUS: Full Code    Date of Service: 22       Restrictions:  Restrictions/Precautions: General Precautions; Fall Risk  Position Activity Restriction  Other position/activity restrictions: RUE PICC, sacral wound vac     SUBJECTIVE  Subjective  Subjective: Pt seated EOB with OT upon arrival and agreeable to PT session although reports increased fatigue (\"I'm dog tired\") and lightheadedness (BP stable 106/61)  Pain: Pt reports 8/10 pain in buttocks, back and hip. RN notified and administers pain medication at end of session          OBJECTIVE  Cognition  Overall Cognitive Status: Exceptions  Arousal/Alertness: Delayed responses to stimuli  Following Commands: Follows one step commands with repetition; Follows multistep commands with repitition  Attention Span: Attends with cues to redirect  Memory: Decreased recall of precautions;Decreased short term memory  Safety Judgement: Decreased awareness of need for assistance;Decreased awareness of need for safety  Problem Solving: Assistance required to correct errors made;Assistance required to implement solutions;Assistance required to generate solutions;Assistance required to identify errors made  Insights: Fully aware of deficits  Initiation: Requires cues for some  Sequencing: Requires cues for some  Orientation  Overall Orientation Status: Within Functional Limits    Functional Mobility  Scooting  Assistance Level: Dependent; Requires x 2 assistance (max A x 2 to scoot to [facility])  Balance  Sitting Balance: Stand by assistance (pt sat EOB x 2 mins with SBA for balance; further tolerance for EOB limited by lightheadedness and fatigue, requesting to return to supine)  Further transfers or mobility deferred this afternoon d/t increased fatigue and c/o lightheadedness         PT Exercises  A/AROM Exercises: Supine BLE exercises: quad sets x 15 (5 sec hold each rep), heelslides x 15, SAQ x 15. Pt requires ~3-4 min rest break after each exercise d/t fatigue; requires min encouragement to participate in exercises d/t fatigue      ASSESSMENT/PROGRESS TOWARDS GOALS  Vital Signs  Pulse: 79  BP: 106/61  BP Location: Left upper arm  MAP (Calculated): 76  SpO2: 93 %  O2 Device: None (Room air)    Assessment  Assessment: Pt seen for PT session this afternoon, focusing in bed mobility and LE exercises. Pt reporting increased fatigue this afternoon as well as lightheadedness when sitting EOB (although BP stable 106/61) therefore requesting to return to supine. Pt participated in LE supine exercises with increased time and multiple rest breaks d/t fatigue. He remains limited by pain, deconditioning, fatigue, decreased generalized strength, and decreased activity tolerance. Will continue to progress functional mobility as appropriate. Activity Tolerance: Patient limited by fatigue;Patient limited by endurance; Patient limited by pain  Discharge Recommendations: [address] [facility]      Addendum Second Session THE [facility], PT)  Assessment: Pt seen as co-treat with OT to facilitate safe transfers training. Pt donned shorts with assistance in supine. Pt returned to bed at end of session d/t fatigue. Pt with minimal dizziness during session. Pt fatigues as session progresses, requiring increased assistance for transfers. Pt instructed to transfer to chair with nursing staff prior to next individual therapy session, pt verbalizes understanding. Vitals: 135/82, 100 bpm, 95% with 7/10 pain in back and buttocks in supine. 124/72 following transfer to w/c. 112/66 at end of session in bed. Bed mobility: Pt completes supine>sit with mod A and HOB elevated with increased time and assistance for trunk.  Pt completes sit>supine with mod A x2    Transfers: Pt completes STS with herb plus from bed and w/c (min A, dependent overall from bed and max A from w/c d/t fatigue). Gilmer Jews plus used to transfer bed<>w/c dependently. Pt completes sit pivot transfers w/c<>EOM with set up completed to remove armrest and align pt properly with brakes locked. Pt completes first transfer in each direction w/c<>EOM with min A x2 and min cues for sequencing and technique. Pt then completes second transfer in each direction w/c<>EOM with min A x1 + mod A x1 d/t increased fatigue. Shoes donned dax to assist with  on floor and balance. Pt requires cues for proper technique to lean to offload. Will require reinforcement. Pt requires rest breaks, lethargic during session. Goals  Patient Goals   Patient goals : \"to walk again\"  Short Term Goals  Time Frame for Short term goals: 10 days- 4/22/22  Short term goal 1: Pt will complete bed mobility with min A; goal partially met 4/22 - pt completes supine>sit with min A and up to mod A x2 for sit>supine  Short term goal 2: Pt will complete functional transfers with max A x 1 using LRAD; not met 4/22 - pt requires up to max A x2 in // bars and herb stedy  Short term goal 3: Pt will propel manual w/c 50ft with min A; goal met 4/21 - pt propels manual w/c 75 ft with min A  Short term goal 4: Pt will tolerate gait assessment and appropriate LTG to be set; goal not met 4/22 - pt attempts to step but unable to complete successfully to attempt walking  Long Term Goals  Time Frame for Long term goals : 3 weeks- 5/4/22  Long term goal 1: Pt will complete bed mobility with supervision  Long term goal 2: Pt will complete functional transfers with min A using LRAD  Long term goal 3: Pt will propel manual w/c 150ft with mod I    PLAN OF CARE/SAFETY  Plan  Plan:  minutes of therapy at least 5 out of 7 days a week  Plan weeks: 3 weeks  Current Treatment Recommendations: Strengthening;Balance training;Functional mobility training;Transfer training; Endurance training;Gait training;Neuromuscular re-education;Home exercise program;Safety education & training;Patient/Caregiver education & training; Wheelchair mobility training;Equipment evaluation, education, & procurement;Positioning;Manual Therapy - Soft Tissue Mobilization; Therapeutic activities  Safety Devices  Type of Devices: All fall risk precautions in place; Bed alarm in place;Call light within reach; Patient at risk for falls; Left in bed;Nurse notified      Therapy Time   Individual Concurrent Group Co-treatment   Time In 1330         Time Out 1400         Minutes 30           Timed Code Treatment Minutes: 60 Minutes     Second Session Therapy Time:   Individual Concurrent Group Co-treatment   Time In      0900   Time Out      1000   Minutes      60     Timed Code Treatment Minutes:  60 mins    Total Treatment Minutes:  90 mins    Elisabeth Raomn PT, DPT 04/27/22 at 2:10 PM

## 2022-04-27 NOTE — CONSULTS
[facility] Nurse  Follow-up Progress Note       NAME:  Abbott Breeze  MEDICAL RECORD NUMBER:  [id number]  AGE:  42 y.o. GENDER:  male  :  1975  TODAY'S DATE:  2022    Subjective: They just changed my catheter. Wound Identification:  Wound Type:  traumatic left foot.  Healing stage 4 pressure injury coccyx/sacrum. Contributing Factors:  edema, diabetes, chronic pressure, decreased mobility, shear force, obesity, incontinence of stool and incontinence of urine        Patient Goal of Care:  [x] Wound Healing  [] Odor Control   [] Palliative Care  [] Pain Control   [] Other:     Objective: Lying in bed. Wife at bed side. /61   Pulse 79   Temp 97.8 °F (36.6 °C) (Oral)   Resp 16   Ht 6' 0.5\" (1.842 m)   Wt 211 lb 11.2 oz (96 kg)   SpO2 93%   BMI 28.32 kg/m²   Shimon Risk Score: Shimon Scale Score: 15  Assessment: Left foot granulation tissue present. Treating this wound will start Moist to Moist dressings bid. Coccyx wound continues to improve. Smaller sponge needed in wound bed.   wounds with smaller measurements. See photo today.  Wife observed wound and pleased to see progression toward healing   Measurements:  Negative Pressure Wound Therapy Coccyx (Active)   $ Standard NPWT <=50 sq cm PER TX $ Yes 22 1704   $ Standard NPWT >50 sq cm PER TX $ Yes 22 1605   Wound Type Pressure ulcer: Stage IV 22 1605   Unit Type [employer] rental [id number] 22 1605   Dressing Type Black Foam 22 1605   Number of pieces used 2 22 1605   Cycle Continuous 22 1605   Target Pressure (mmHg) 125 22 1605   Intensity 1 22 1605   Canister changed?  No 22 1605   Dressing Status New dressing applied 22 1605   Dressing Changed Changed/New 22 1605   Drainage Amount Small 22 1605   Drainage Description Serosanguinous 22 1605   Dressing Change Due 22 1605   Output (ml) 50 ml 04/22/22 1704   Wound Assessment Epithelialization;Granulation tissue; Red 04/27/22 1605   Shape heart shaped 04/25/22 1444   Odor None 04/27/22 1605   Number of days: 84       Wound 01/24/22 Foot Left;Dorsal I & D to left dorsal foot by Dr. Titi Islas, surgical wound (Active)   Number of days: 93       Wound 01/30/22 Sacrum SDTI (Active)   Wound Image   04/27/22 1605   Wound Etiology Pressure Stage 4 04/27/22 1605   Dressing Status New dressing applied 04/27/22 1605   Wound Cleansed Cleansed with saline;Irrigated with saline 04/27/22 1605   Dressing/Treatment Barrier film;Hydrocolloid; Negative pressure wound therapy 04/27/22 1605   Offloading for Diabetic Foot Ulcers Offloading ordered; Other (comment) 04/27/22 1605   Dressing Change Due 04/29/22 04/27/22 1605   Wound Length (cm) 9.5 cm 04/27/22 1605   Wound Width (cm) 6.5 cm 04/27/22 1605   Wound Depth (cm) 1.5 cm 04/27/22 1605   Wound Surface Area (cm^2) 61.75 cm^2 04/27/22 1605   Change in Wound Size % (l*w) -208.75 04/27/22 1605   Wound Volume (cm^3) 92.625 cm^3 04/27/22 1605   Wound Healing % -581 04/27/22 1605   Distance Tunneling (cm) 0 cm 04/27/22 1605   Tunneling Position ___ O'Clock 0 04/27/22 1605   Undermining Starts ___ O'Clock 0 04/27/22 1605   Undermining Ends___ O'Clock 0 04/27/22 1605   Undermining Maxium Distance (cm) 0 04/27/22 1605   Wound Assessment Granulation tissue; Purple/maroon 04/27/22 1605   Drainage Amount Small 04/27/22 1605   Drainage Description Serosanguinous 04/27/22 1605   Odor None 04/27/22 1605   Shanita-wound Assessment Dry/flaky; Intact 04/27/22 1605   Margins Attached edges; Defined edges 04/27/22 1605   Wound Thickness Description not for Pressure Injury Full thickness 04/27/22 1605   Number of days: 87     Incision 03/08/22 Foot Anterior; Left (Active)   Wound Image   04/27/22 1605   Dressing Status New dressing applied 04/27/22 1605   Dressing Change Due 04/29/22 04/27/22 1605   Incision Cleansed Cleansed with saline 04/27/22 1605 Dressing/Treatment Barrier film; Moist to moist 04/27/22 1605   Incision Length (cm) 2 04/27/22 1605   Incision Width (cm) 5 cm 04/27/22 1605   Incision Depth (cm) 0.2 cm 04/27/22 1605   Margins Other (Comment) 04/27/22 1605   Incision Assessment Epithelialization 04/27/22 1605   Drainage Amount Small 04/27/22 1605   Drainage Description Serosanguinous 04/27/22 1605   Odor None 04/27/22 1605   Patricia-incision Assessment Dry/flaky; Intact 04/27/22 1605   Number of days: 50       Response to treatment:  Well tolerated by patient. Pain Assessment:  Severity:  0 / 10  Quality of pain: N/A  Wound Pain Timing/Severity: none  Premedicated: No  Plan:   Plan of Care: Wound 01/30/22 Sacrum SDTI-Dressing/Treatment: [place] film,Hydrocolloid,Negative pressure wound therapy  [REMOVED] Wound 02/14/22 Head Left; Lower; Posterior Friction/pressure wound-unstageable. 4/14/22 healed now, will complete LDA per CWOCN-Dressing/Treatment: Open to air  [REMOVED] Wound 01/24/22 Hand Left;Dorsal-Dressing/Treatment: Open to air   Current dressing removed.  Cleaned wounds with normal saline. Left foot wound prepped patricia wound with moisture barrier, then moist to moist dsg applied, dry dressing and secured with roll guaze and tape. One black sponge placed in right and mid coccyx. Left buttocks covered with duoderm dressing.  tracked to left hip. Connected to 125 mmHg low continuous suction.  Wound care to continue to follow.  Call Wound care for problems with seal at [phone] or call [phone] and leave message. Thanks     Recommend:  Clean feet and legs with warm soapy water, foamy cleanser or bath wipes daily.  Pat dry.  Apply aquapor ointment to feet and legs daily.   Clean left foot wound with normal saline. Prep patricia wound with zinc paste or barrier film.  Apply moist to moist dressing daily (avoid putting dsg on healthy skin)  to left foot.  Cover with dry dressing and roll guaze.  Continue NPWT dressing to coccyx wound change 3 times a week.        Specialty Bed Required : Yes   [x] Low Air Loss   [x] Pressure Redistribution  [] Fluid Immersion  [] Bariatric  [] Total Pressure Relief  [] Other:     Current Diet: ADULT DIET; Regular; 4 carb choices (60 gm/meal)  ADULT ORAL NUTRITION SUPPLEMENT; Breakfast, Dinner, Lunch; Low Calorie/High Protein Oral Supplement  Dietician consult:  Yes    Discharge Plan:  Placement for patient upon discharge: skilled nursing   Patient appropriate for Outpatient [address]: Yes    Referrals:  []  following plan for discharge next week 5/4/22. [] [address] [facility]  [] Supplies  [] Other    Patient/Caregiver Teaching: Updated patient and wife on wound measurement improvements.   Level of patient/caregiver understanding able to:   [] Indicates understanding       [] Needs reinforcement  [] Unsuccessful      [x] Verbal Understanding  [] Demonstrated understanding       [] No evidence of learning  [] Refused teaching         [] N/A       Electronically signed by Chante Olmstead RN, MSN, Jose M Jefferson on 4/27/2022 at 5:10 PM

## 2022-04-28 NOTE — CARE COORDINATION
Per physician reviewer LOS approval to 5/3/22. Last covered day is 5/2/22. Please provide discharge date and disposition or clinical review on 5/22/22 by 1pm if not discharged. [facility] Physician is requested the following with next review details regarding what the protocol are being using to address this recurrent hypotension. Are there any medication, any daily orthostatic vitals, placing compress hose before sitting up, placing abd binder for activity? This also is likely limiting his ability to progress with therapy. With next review include status on family teaching, therapy, tolerance, nursing needs (wound measurements and tx), barriers to discharge and why, along with discharge plans.     Lissett Jansen MPH, RRT  Clinical Liaison [address]  X)[phone] ([phone]   Electronically signed by Lissett Jansen on 4/28/2022 at 12:51 PM

## 2022-04-28 NOTE — PROGRESS NOTES
Occupational Therapy  Facility/Department: [facility]  Daily Treatment Note  NAME: Paola Jeffrey  : 1975  MRN: [id number]    Date of Service: 2022    Discharge Recommendations:  Continue to assess pending progress,Patient would benefit from continued therapy after discharge  OT Equipment Recommendations  Equipment Needed: Yes  Mobility Devices: ADL Assistive Devices  ADL Assistive Devices: Grab Bars - shower;Transfer Tub Bench  Other: CTA pending progress      Patient Diagnosis(es): There were no encounter diagnoses. Assessment    Assessment: Patient continues to be hallucinating and confused throughout, hard to redirect at times. However, pt is progressing this date with standing tolerance, able to complete 5 sit <> stands in // bars with grossly min-modA x2. Pt was able to toelrate ~10 seconds each time, increased follow through of hand over hand and sequencing with mechanics. Pt was able to complete full UE bathing and dressing with grossly setup-SPV with cues. Continues to progress towards goals despite cognition change, RN and MD aware. Cont OT POC. Activity Tolerance: Patient limited by fatigue;Patient limited by endurance; Patient limited by pain  Discharge Recommendations: Continue to assess pending progress; Patient would benefit from continued therapy after discharge  Equipment Needed: Yes  Mobility Devices: ADL Assistive Devices  Other: CTA pending progress      Plan   Plan  Times per Week: 5-7x per week  Times per Day: Daily  Current Treatment Recommendations: Strengthening;ROM;Balance training;Functional mobility training; Endurance training;Pain management; Safety education & training;Patient/Caregiver education & training;Positioning;Self-Care / ADL;Neuromuscular re-education; Wheelchair mobility training;Equipment evaluation, education, & procurement     Subjective   Subjective  Subjective: Pt in bed, wife present. Very confused and hallucinating throughout.  Agreeable to OT/PT cotx for functional transfer training. Pain: 6/10 in buttocks, repositioned in bed and RN made aware. Objective    Vitals              OT Exercises  A/AROM Exercises: x20 of the following exercises: shoulder flexion, elbow flexion/ext, wrist flexion/ext, finger flexion/ext following along HEP provided last session (4x5 reps with 1# free weight)             Goals  Short Term Goals  Time Frame for Short term goals: Pt will complete UB dressing with min A- GOAL MET; Pt SBA for UB dressing at EOB  Short Term Goal 1: Pt will complete LB dressing with mod A  Short Term Goal 2: Pt will complete LB bathing with mod A-GOAL MET 4/26  Short Term Goal 3: Pt will complete UB bathing with SBA-GOAL MET 4/26  Short Term Goal 4: Pt will complete 3 grooming task at w/c level-GOAL MET 4/26  Short Term Goal 5: Pt will complete x20 reps of BUE HEP to improve UB strength/endurance for repositioning and UB ADLs- GOAL MET 4/20;  Pt completing x20 BUE AROM  Additional Goals?: No  Long Term Goals  Time Frame for Long term goals : 3 weeks (5/05)  Long Term Goal 1: Pt will complete total body dressing with supv  Long Term Goal 2: Pt will complete total body bathing with supv  Long Term Goal 3: Pt will complete 3 grooming tasks at sink with mod I  Long Term Goal 4: Pt will perform functional transfers with mod I  Patient Goals   Patient goals : 1 week (4/21)-EXTEND TO 4/27       Therapy Time   Individual Concurrent Group Co-treatment   Time In 1330     1230   Time Out 1400     1330   Minutes 30     60   Timed Code Treatment Minutes: [address] Paige Monae, OTR/L

## 2022-04-28 NOTE — PROGRESS NOTES
Jono Stephens  4/28/2022  [id number]    Chief Complaint: Critical illness myopathy    Subjective:   No acute events overnight. Today Larry Brenner is seen in his room with ID. He reports increased pain and continued fatigue. Per report he continues to hallucinate. ROS: denies f/c, sob, cp    Objective:  Patient Vitals for the past 24 hrs:   BP Temp Temp src Pulse Resp SpO2   04/28/22 1050 (!) 106/103   103  95 %   04/28/22 0837 (!) 166/103 98 °F (36.7 °C) Oral 103 16 95 %   04/27/22 2000 129/77 97.1 °F (36.2 °C) Oral 88 16 95 %   04/27/22 1405 106/61   79  93 %   04/27/22 1230 106/61   79  93 %     Gen: No distress, pleasant. Supine in bed  HEENT: Normocephalic, atraumatic  CV: extremities well perfused  Resp: No respiratory distress. Abd: Soft, nontender, nondistended  Ext: No edema  Skin: [place] to sacrum and L foot with appropriate seal   Neuro: Alert, oriented, appropriately interactive.  Speech fluent without aphasia    Wt Readings from Last 3 Encounters:   04/26/22 211 lb 11.2 oz (96 kg)   03/10/22 255 lb 1.6 oz (115.7 kg)   10/14/21 282 lb (127.9 kg)       Laboratory data:   Lab Results   Component Value Date    WBC 7.8 04/28/2022    HGB 9.2 (L) 04/28/2022    HCT 26.3 (L) 04/28/2022    MCV 92.5 04/28/2022     04/28/2022       Lab Results   Component Value Date     04/28/2022    K 5.2 04/28/2022     04/28/2022    CO2 25 04/28/2022    BUN 31 04/28/2022    CREATININE 1.6 04/28/2022    GLUCOSE 215 04/28/2022    CALCIUM 10.7 04/28/2022        Therapy progress:  PT  Position Activity Restriction  Other position/activity restrictions: RUE PICC, sacral wound vac  Objective     Sit to Stand: Maximum Assistance,Dependent/Total,2 Person Assistance  Stand to sit: Maximum Assistance,Dependent/Total,2 Person Assistance  Bed to Chair: Dependent/Total     OT  PT Equipment Recommendations  Other: TBD pending progress and increasead functional mobility assessment; likely w/c     Assessment SLP  Current Diet : Regular  Current Liquid Diet : Thin  Diet Solids Recommendation: Regular  Liquid Consistency Recommendation: Thin    Body mass index is 28.32 kg/m². Assessment and Plan:  Abram Deleon is a 42year old male with a past medical history significant for DM2, diabetic foot ulcer with multiple amputations, HFrEF who has had a prolonged hospital course following Covid pneumonia with complications including respiratory failure, GIB, and multiple infections. He was admitted to [facility] on 4/13/22 due to functional deficits below his baseline.      Critical Illness Myopathy  - due to covid pneumonia  - PT, OT, ST     MSSA bactermia  - with left foot abscess, left hand abscess, osteomyelitis, RUL abscess  - transitioned to oral doxycycline to continue through 4/28     Multiple wounds POA  - wound vac to sacral wound and left foot wound  - wound care    Encephalopathy  - etiology unclear, infection versus polypharmacy  - CBC without leukocytosis  - decreased scheduled dilaudid  - UA and CXR concerning for possible infection. Started Zosyn  - ID consulted, appreciate assistance     HFrEF  - EF 35%  - hold lasix due to RODRICK  - electrolyte replacement  - daily weights     RODRICK on CKD  - RODRICK due to sepsis, cardiac arrest. Required dialysis during acute stay  - Cr trending down.  Previously been in 1-1.2 range  - stopped lasix  - fluids per Nephrology, appreciate assistance  - daily BMP    Hyperkalemia  - suspect due to kidney dysfunction  - s/p lokemia 4/26  - Nephro following    Atrial Fibrillation  - BB  - AC contraindicated due to bleeding risk    HTN  - lopressor 12.5 mg BID  - discontinued lisinopril     DM2 complicated by neuropathy  - lantus 25, added prandial 4U, SSI  - home regimen lantus 75, prandial 15, SSI     Diabetic Neuropathy  - gabapentin renally dosed     Acute blood loss anemia  - recent GIB s/p embolization of gastroduodenal artery on 2/25 and ex-lap  - monitor and transfuse for Hb<7     History of GIB  - PPI     Nausea  - PRN zofran and compazine  - reglan     Urinary Retention  - flomax  - ISC with high volumes, rea replaced  - will need follow up with Urology     Chronic Pain  - dilaudid 1 mg q6 hours, decreased 4/27, continue to wean as able  - oxycodone PRN     Bowel: Per protocol  Bladder: Per protocol  Sleep: Trazodone PRN  Pain: neurontin 400, dilaudid PO 2 Q6H scheduled, tizanidine 8 BID.  Tylenol and cy 5-10 PRN  DVT PPx: heparin    Services/DME: home PT, OT, nursing  EDOD: 5/3     Follow up appointments: PCP, Cardiology, wound care    Electronically signed by Julius Magaña MD on 4/28/2022 at 11:34 AM

## 2022-04-28 NOTE — PROGRESS NOTES
Physical Therapy  Facility/Department: [facility]  Rehabilitation Physical Therapy Treatment Note    NAME: Ariane Mendoza  : 1975 (42 y.o.)  MRN: [id number]  CODE STATUS: Full Code    Date of Service: 22       Restrictions:  Restrictions/Precautions: General Precautions; Fall Risk  Position Activity Restriction  Other position/activity restrictions: RUE PICC, sacral wound vac     SUBJECTIVE  Subjective  Subjective: Pt supine in bed upon arrival and agreeable to PT session with encouragement; reports increased pain and nausea this morning. RN aware and states he administered both pain medication and anti-nausea medication prior to PT session  Pain: Pt reports 9/10 pain in buttocks and back          OBJECTIVE  Orientation  Overall Orientation Status: Within Functional Limits    Functional Mobility  Scooting  Assistance Level: Dependent; Requires x 2 assistance (total A x 2 to scoot to [facility])   Further bed mobility or transfers deferred this session d/t increased pain and nausea      PT Exercises  A/AROM Exercises: Supine BLE exercises: quad sets x 15, heelslides x 10 (limited range d/t pain and weakness), SAQ x 15, hip IR/ER x 10 (AAROM), hip abd/add x 10 (AAROM). Pt requires ~3-4 min rest break after each exercise d/t fatigue; requires consistent encouragement to participate in exercises d/t fatigue      ASSESSMENT/PROGRESS TOWARDS GOALS  Vital Signs  Pulse: 103  BP: (!) 106/103  BP Location: Left upper arm  MAP (Calculated): 104  SpO2: 95 %  O2 Device: None (Room air)    Assessment  Assessment: Pt seen for PT session this morning, focusing on LE exercises as pt with increased pain and nausea this morning. Pt significantly limited by pain and nausea, requiring increased time and multiple therapeutic rest breaks throughout exercises. He requires encouragement for minimal LE exercises, through limited range for each.  After completing 5 exercises, pt states \"I quit\" and states unable to tolerate further exercise or activity with PT this morning. Pt actively hallucinating throughout session, reporting seeing a \"pitcher on the ceiling\" and seeing his spouse and daugher in room (although no family present); RN aware. He remains limited by pain, deconditioning, fatigue, decreased generalized strength, and decreased activity tolerance. Will continue to progress functional mobility as appropriate. Activity Tolerance: Patient limited by fatigue;Patient limited by endurance; Patient limited by pain  Discharge Recommendations: [address]    Addendum Second Session [facility], PT)  Assessment: Pt seen as co-treat with OT to facilitate safe transfers training. Pt limited this session by hallucinations and fatigue. Utilized herb plus for transfers between surfaces and trialed standing in // bars to progress activity tolerance and mobility. Pt states \"That's it\" in // bars, stating he is done with therapy for the day. Pt agreeable to sit up in chair at end of session. Pt's wife present throughout session, states he is more confused today than yesterday. Pt often pointing out dirt, bugs, safety hazards etc.that are not actually present. RN and MD aware. Vitals: 96%, 86 bpm, 115/71->98/61    Bed mobility: Pt completes supine>sit with mod A    Transfers: Pt completes transfers dependently with use of herb plus bed>w/c and w/c>recliner. Pt completes 4 standing trials in // bars with max A x2 with assistance at trunk and dax knee block. Pt tolerates standing <30 seconds at a time and requires cues for upright posture, hip, knee, and trunk extension.       Goals  Patient Goals   Patient goals : \"to walk again\"  Short Term Goals  Time Frame for Short term goals: 10 days- 4/22/22  Short term goal 1: Pt will complete bed mobility with min A; goal partially met 4/22 - pt completes supine>sit with min A and up to mod A x2 for sit>supine  Short term goal 2: Pt will complete functional transfers with max A x 1 using LRAD; not met 4/22 - pt requires up to max A x2 in // bars and herb stedy  Short term goal 3: Pt will propel manual w/c 50ft with min A; goal met 4/21 - pt propels manual w/c 75 ft with min A  Short term goal 4: Pt will tolerate gait assessment and appropriate LTG to be set; goal not met 4/22 - pt attempts to step but unable to complete successfully to attempt walking  Long Term Goals  Time Frame for Long term goals : 3 weeks- 5/4/22  Long term goal 1: Pt will complete bed mobility with supervision  Long term goal 2: Pt will complete functional transfers with min A using LRAD  Long term goal 3: Pt will propel manual w/c 150ft with mod I    PLAN OF CARE/SAFETY  Plan  Plan:  minutes of therapy at least 5 out of 7 days a week  Plan weeks: 3 weeks  Current Treatment Recommendations: Strengthening;Balance training;Functional mobility training;Transfer training; Endurance training;Gait training;Neuromuscular re-education;Home exercise program;Safety education & training;Patient/Caregiver education & training; Wheelchair mobility training;Equipment evaluation, education, & procurement;Positioning;Manual Therapy - Soft Tissue Mobilization; Therapeutic activities  Safety Devices  Type of Devices: All annemarie prominences offloaded; All fall risk precautions in place; Bed alarm in place;Call light within reach; Heels elevated for pressure relief;Patient at risk for falls; Left in bed;Nurse notified      Therapy Time   Individual Concurrent Group Co-treatment   Time In 0900      1230   Time Out 0930      1330   Minutes 30      60     Timed Code Treatment Minutes: Esha Castelan, PT, DPT 04/28/22 at 10:56 AM

## 2022-04-28 NOTE — CONSULTS
Infectious Diseases   Consult Note      Reason for Consult:  Mental status change, eval for infectious etiology    Requesting Physician:  Dr. Bill Lyons       Date of Admission: 4/13/2022  Subjective:   CHIEF COMPLAINT:  None given       HPI:    Leslie Hammond is a 45yoM with history of obesity, DM, neuropathy, cardiomyopathy    Complex recent history. Admitted to [address] [place] [address] Rd 1/22/22 with sepsis, RODRICK, MSSA bacteremia stemming from deep infection of the left foot, hypoxemic respiratory failure progressed to cardiac arrest  S/p I&D of the foot 2/1/22, 3/8/22  I&D L hand abscess 2/5/22 with isolation of MSSA and Enterococci. GI bleed, s/p embolization of gastroduodenal ulcer and then ex-lap with oversew duodenal artery, pyloroplasty 2/27/22    He developed pressure wounds     He was admitted to [facility] 3/10/22  On 3/23/22 he had lap choley   Was found to have small RUL abscess  Last ID note at [facility] dated 4/4/22 indicated he would continue IV cefazolin through 4/19/22, then po doxy with planned end date 4/28/22               Admitted ARU 4/14/22     On 4/27/22, he was noted to be more fatigued with some hallucinations. No fever. WBC wnl.   UA with pyuria. CXR with perihilar opacities. He says he is tired and not sleeping. Ongoing nausea relieved with Reglan. Having BM. Eating.  sx denied. On RA. His primary complaint if L shoulder pain radiating to fingers. I see he had L shoulder MRI at [facility] 3/31/22 that was abnormal, see report below. We are consulted for further recommendations re possible infectious etiology for recent mental status change.         Current abx:  Zosyn 3.375 q8, s 4/27/22       Past Surgical History:       Diagnosis Date    Abscess of left foot 1/24/2022    Abscess of left hand     Acute encephalopathy     Acute hypoxemic respiratory failure (HCC)     Acute osteomyelitis of left hand ([address] 2/7/2022    Acute osteomyelitis of right hallux ([address] 08/2019    Cardiopulmonary arrest ([address]     Cellulitis of left foot 7/26/2020    CHF (congestive heart failure) ([address] 09/2014    presumably from viral myocarditis    Chronic osteomyelitis of left foot ([address] 10/27/2020    Closed displaced fracture of distal phalanx of right little finger 4/8/2021    Clostridium difficile infection 11/01/2016    Diabetic ulcer of left forefoot associated with type 2 diabetes mellitus, with necrosis of bone ([address] 8/1/2019    Diabetic ulcer of right great toe associated with type 2 diabetes mellitus, with necrosis of bone ([address] 08/2019    Enterococcal infection 2/3/2022    Failed soft 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 toe amputation site 10/26/2020    Hemodialysis patient [facility])     acute dialysis while in hospital. resolved    History of blood transfusion     History of hyperbaric oxygen therapy 10/28/2020    [facility]- [facility] - compromised flap    Hyperlipidemia     Hypertension     Infective tenosynovitis of extensor tendons of left hand 2/3/2022    Migraine     MSSA bacteremia 1/23/2022    Possible perforated tympanic membrane     as a result of recurrent otitis    Post-op hematoma of left foot 11/12/2020    Recurrent otitis media     Septic shock (HCC)     Staphylococcal arthritis of left foot ([address] 2/1/2022    Syncope     Tear of medial meniscus of left knee     Tobacco use     Toe osteomyelitis, left ([address] 7/24/2020    VAP (ventilator-associated pneumonia) ([address]          Procedure Laterality Date    ARM SURGERY Left 02/05/2022    LEFT HAND INCISION AND DRAINAGE performed by Nidia Warner MD at [address], ESOPHAGUS      bleeding ulcer correction    ENDOSCOPY, COLON, DIAGNOSTIC      FOOT DEBRIDEMENT Left 02/01/2022    ULCER DEBRIDEMENT LEFT FOOT performed by Pablo Curry DPM at [facility] Left 3/8/2022    ULCER DEBRIDEMENT LEFT FOOT performed by Pablo Curry DPM at [facility] EMBOLIZATION HEMORRHAGE Right 02/25/2022    successful coil embolization of the gastroduodenal artery    IR NONTUNNELED VASCULAR CATHETER  02/11/2022    IR NONTUNNELED VASCULAR CATHETER 2/11/2022 [facility] PROCED[facility]    KNEE ARTHROSCOPY Left 08/15/2013    LEFT KNEE ARTHROSCOPY , SYNOVECTOMY       LAPAROTOMY N/A 2/27/2022    PYLOROPLASTY, ULCER OVER-SEW, JEJUNOSTOMY TUBE INSERTION performed by Skyla Patten MD at [address] Left 07/24/2020    PARTIAL LEFT FOOT AMPUTATION    TOE AMPUTATION Right 08/23/2019    PARTIAL RIGHT FOOT AMPUTATION performed by Sherol Scheuermann, DPM at [address] TOE AMPUTATION Left 07/24/2020    PARTIAL LEFT FOOT AMPUTATION performed by Sherol Scheuermann, DPM at [address] TOE AMPUTATION Left 10/22/2020    PARTIAL LEFT FOOT AMPUTATION WITH GRAFT APPLICATION performed by Sherol Scheuermann, DPM at [address] ENDOSCOPY N/A 02/17/2022    EGD W/ANES. performed by Kenton Samayoa MD at [facility] N/A 2/27/2022    EGD DIAGNOSTIC ONLY performed by Maria Eugenia Munoz MD at [facility] EXTRACTION         Social History:    TOBACCO:   reports that he quit smoking about 16 years ago. He has a 1.00 pack-year smoking history. He quit smokeless tobacco use about 16 years ago. ETOH:   reports previous alcohol use. There is no history of illicit drug use or other significant epidemiologic exposures.       Family History:       Problem Relation Age of Onset    Diabetes Mother     High Blood Pressure Mother     High Cholesterol Mother     Diabetes Father     High Blood Pressure Father     High Cholesterol Father     Stroke Father     High Blood Pressure Sister     High Blood Pressure Maternal Uncle     High Cholesterol Maternal Uncle     High Blood Pressure Maternal Grandmother        Current Medications:    Current Facility-Administered Medications: gabapentin (NEURONTIN) capsule 100 mg, 100 mg, Oral, Nightly  HYDROmorphone (DILAUDID) tablet 0.5 mg, 0.5 mg, Oral, Q6H WA  piperacillin-tazobactam (ZOSYN) 3,375 mg in dextrose 5 % 50 mL IVPB extended infusion (mini-bag), 3,375 mg, IntraVENous, Q8H  traZODone (DESYREL) tablet 100 mg, 100 mg, Oral, Nightly  0.9 % sodium chloride infusion, , IntraVENous, Continuous  metoprolol tartrate (LOPRESSOR) tablet 12.5 mg, 12.5 mg, Oral, BID  mineral oil-hydrophilic petrolatum (AQUAPHOR) ointment, , Topical, Daily  insulin lispro (HUMALOG) injection vial 0-18 Units, 0-18 Units, SubCUTAneous, TID WC  insulin lispro (HUMALOG) injection vial 0-9 Units, 0-9 Units, SubCUTAneous, Nightly  insulin lispro (HUMALOG) injection vial 4 Units, 4 Units, SubCUTAneous, TID WC  insulin glargine (LANTUS) injection vial 25 Units, 25 Units, SubCUTAneous, Nightly  oxyCODONE (ROXICODONE) immediate release tablet 5 mg, 5 mg, Oral, Q4H PRN  oxyCODONE (ROXICODONE) immediate release tablet 10 mg, 10 mg, Oral, Q4H PRN  prochlorperazine (COMPAZINE) tablet 5 mg, 5 mg, Oral, Q6H PRN  metoclopramide (REGLAN) tablet 10 mg, 10 mg, Oral, 4x Daily AC & HS  ondansetron (ZOFRAN-ODT) disintegrating tablet 4 mg, 4 mg, Oral, Q8H PRN  ondansetron (ZOFRAN) injection 4 mg, 4 mg, IntraVENous, Q6H PRN  hydrALAZINE (APRESOLINE) tablet 10 mg, 10 mg, Oral, Q2H PRN  acetaminophen (TYLENOL) tablet 650 mg, 650 mg, Oral, Q6H PRN  diclofenac sodium (VOLTAREN) 1 % gel 4 g, 4 g, Topical, 4x Daily PRN  [Held by provider] doxycycline hyclate (VIBRA-TABS) tablet 100 mg, 100 mg, Oral, BID  fluticasone (FLONASE) 50 MCG/ACT nasal spray 2 spray, 2 spray, Each Nostril, Nightly  heparin (porcine) injection 5,000 Units, 5,000 Units, SubCUTAneous, 3 times per day  glucose (GLUTOSE) 40 % oral gel 15 g, 15 g, Oral, PRN  glucagon (rDNA) injection 1 mg, 1 mg, IntraMUSCular, PRN  dextrose 5 % solution, 100 mL/hr, IntraVENous, PRN  bisacodyl (DULCOLAX) suppository 10 mg, 10 mg, Rectal, Daily PRN  aluminum & magnesium hydroxide-simethicone (MAALOX) 724-539-58 MG/5ML suspension 30 mL, 30 mL, Oral, Q6H PRN  magnesium oxide (MAG-OX) tablet 800 mg, 800 mg, Oral, TID  OLANZapine (ZYPREXA) tablet 5 mg, 5 mg, Oral, Nightly  pantoprazole (PROTONIX) tablet 40 mg, 40 mg, Oral, BID  PARoxetine (PAXIL) tablet 20 mg, 20 mg, Oral, Daily  polyethylene glycol (GLYCOLAX) packet 17 g, 17 g, Oral, Daily PRN  therapeutic multivitamin-minerals 1 tablet, 1 tablet, Oral, Daily  senna (SENOKOT) tablet 17.2 mg, 2 tablet, Oral, BID  tamsulosin (FLOMAX) capsule 0.4 mg, 0.4 mg, Oral, Nightly  tiZANidine (ZANAFLEX) tablet 8 mg, 8 mg, Oral, BID  dextrose bolus (hypoglycemia) 10% 125 mL, 125 mL, IntraVENous, PRN **OR** dextrose bolus (hypoglycemia) 10% 250 mL, 250 mL, IntraVENous, PRN      Allergies   Allergen Reactions    Januvia [Sitagliptin] Nausea Only     Has taken metformin without side effects in the past.  Nausea with Janumet in the past.     Metformin And Related      GI Upset    Vancomycin      Pt had red face, swelling itching of eyelids, sore throat after receiving vancmomyin and cefepime. I think this was a histamine releasing reaction from vancomycin most likely.  The cefepime was switched to Zosyn and patient had no reaction to Zosyn    Mustard [employer] Isothiocyanate] Swelling and Rash        REVIEW OF SYSTEMS:    CONSTITUTIONAL:   Per HPI   EYES:  negative for acute visual disturbance and icterus  HEENT:  negative for acute hearing loss, tinnitus, ear drainage, sinus pressure, nasal congestion, epistaxis and snoring  RESPIRATORY:  No cough, shortness of breath, hemoptysis  CARDIOVASCULAR:  negative for chest pain, palpitations, exertional chest pressure/discomfort, syncope  GASTROINTESTINAL:  negative for nausea, vomiting, diarrhea, constipation, blood in stool and abdominal pain  GENITOURINARY:  negative for frequency, dysuria, urinary incontinence, decreased urine volume, and hematuria  HEMATOLOGIC/LYMPHATIC:  negative for easy bruising, bleeding and lymphadenopathy  ALLERGIC/IMMUNOLOGIC:  negative for recurrent infections, angioedema, anaphylaxis and drug reactions  ENDOCRINE:  negative for weight changes and diabetic symptoms including polyuria, polydipsia and polyphagia  MUSCULOSKELETAL:    Per HPI   NEUROLOGICAL:  negative for headaches, slurred speech, unilateral weakness  PSYCHIATRIC/BEHAVIORAL: negative for hallucinations, behavioral problems, confusion and agitation. Objective:   PHYSICAL EXAM:      VITALS:  BP (!) 166/103   Pulse 103   Temp 98 °F (36.7 °C) (Oral)   Resp 16   Ht 6' 0.5\" (1.842 m)   Wt 211 lb 11.2 oz (96 kg)   SpO2 95%   BMI 28.32 kg/m²      24HR INTAKE/OUTPUT:      Intake/Output Summary (Last 24 hours) at 4/28/2022 0901  Last data filed at 4/28/2022 0615  Gross per 24 hour   Intake 2478 ml   Output 3650 ml   Net -1172 ml     CONSTITUTIONAL:  Awake, alert, cooperative, no apparent distress, and appears stated age  [de-identified]: NCAT, PERRL, EOMI. Sclera white, conjunctiva full. OP with moist mucosal membranes, no thrush, tongue protrudes midline  NECK:  Supple, symmetrical, trachea midline, no adenopathy  LUNGS:  no increased work of breathing   CTA dax   CARDIOVASCULAR:  RRR without murmur  ABDOMEN:  normal bowel sounds, soft, NT   No tenderness to palpation of the spine   PSYCHIATRIC: Oriented to person place and time. No obvious depression or anxiety. MUSCULOSKELETAL:   Dorsal R foot wound granular, no local signs of infection   SKIN:  normal skin color, texture, turgor and no redness, warmth, or swelling.  No palpable nodules or stigmata of embolic phenomenon  NEUROLOGIC: nonfocal exam  ACCESS:   PICC RUE In place  [place]       DATA:    Old records have been reviewed    CBC:  Recent Labs     04/27/22  1304 04/28/22  0711   WBC 10.7 7.8   RBC 2.67* 2.84*   HGB 8.6* 9.2*   HCT 25.3* 26.3*    310   MCV 94.9 92.5   MCH 32.4 32.2   MCHC 34.1 34.8   RDW 16.9* 16.3*      BMP:  Recent Labs     04/26/22  1625 04/27/22  3450 04/28/22  0711   * 135* 139   K 6.0* 4.9 5.2*   CL 94* 96* 101   CO2 27 26 25   BUN 42* 38* 31*   CREATININE 1.9* 1.8* 1.6*   CALCIUM 10.1 10.8* 10.7*   GLUCOSE 202* 145* 215*        Cultures:   4/27 BC x2 NGTD   UC NGTD       Radiology Review:  All pertinent images / reports were reviewed as a part of this visit. CXR 4/27/22  Impression   Low lung volumes, with subtle bilateral perihilar opacities concerning for   multifocal pneumonia     MRI L shoulder wo franck 3/31/22  Limited study quality secondary to motion degradation. The most significant finding is diffuse edema-like signal throughout the visualized shoulder musculature. Finding are nonspecific, however differential considerations include posttraumatic etiology, rhabdomyolysis, infectious or inflammatory myositis. Tiny interstitial tear of the supraspinatus tendon. No full-thickness retracted tear. Mild acromioclavicular joint degenerative change. Small cortical irregularity along the medial humeral head, nonspecific but may reflect a small erosion.        Assessment:     Patient Active Problem List   Diagnosis    Hypertension    Uncontrolled type 2 diabetes mellitus with diabetic polyneuropathy ([address]    Cardiomyopathy ([address]    Mixed hyperlipidemia    Allergic rhinitis    Osteoarthritis    Acute osteomyelitis of left foot (HCC)    Acute renal failure (HCC)    Staph aureus infection    Third degree burn of left hand    Antibiotic-associated diarrhea    Pressure injury of deep tissue of sacral region    Severe protein-calorie malnutrition (HCC)    Paroxysmal atrial fibrillation (HCC)    Duodenal ulcer    Diabetic ulcer of left midfoot associated with type 2 diabetes mellitus, with necrosis of bone ([address]    Probable abscess of upper lobe of right lung without pneumonia (HCC)    Gastrointestinal hemorrhage    ABLA (acute blood loss anemia)    MSSA bacteremia    Critical illness myopathy    Moderate malnutrition ([address] Complicated course following admission 1/2022 with sepsis, MSSA bacteremia  -L foot infection, s/p serial I&D  -L hand abscess s/p I&D  -Severe GIB req ex-lap, oversew duodenal a and pyloroplasty   -S/p lap -choley     Admitted to ARU    Mental status change, lethargy   Without fever or leukocytosis   Pyuria but insensitive for infection in context of catheterization. BC and ?UC are running negative to date     L shoulder pain  MRI 4 weeks ago with ? changes of myositis   Metastatic staphylococcal infection is always a concerns in this context though there was no effusion noted  Will check CK for completeness and defer to PMR re need of Ortho evaluation     RODRICK - recovered, stable     Pressure wounds without local signs of infection     Listed allergy to vanc      Recs:  Certainly he is at high risk for nosocomial infection with PICC, rea in place and following several surgeries, wounds, etc.  At risk of complication from prior infections as well. However, at this time have relatively low suspicion that acute infection is culprit for these acute mental status changes    -DC Zosyn and monitor expectantly  -check CK   -should condition deteriorate w concern of infection, would start broad abx, repeat cultures and consider CT CAP     D/w attending   Will follow with you        Jonnathan Meyer M.D. Thank you for the opportunity to participate in the care of your patient.     Please do not hesitate to contact me:   [phone] office

## 2022-04-28 NOTE — PROGRESS NOTES
Patient observed talking while sleeping. Followed by waking and calling out to no particular audience. Arrived at room to seek patients needs. Patient stated he was [id number] [address] but had concerns about his wife. When asked about his concerns patient told nurse that we could ask her as she was standing behind us wearing a hat. Staff informed patient that his wife was not present and redirected where we was. Patient was able to be redirected and described situation as feeling like being in a dream but while awake. Patient oriented to factual surroundings at this time.

## 2022-04-28 NOTE — PROGRESS NOTES
Nephrology Progress  Note   [email or web address]       CC:RODRICK  HPI  The patient is a 42 y.o. male  with PMH of CHF,recent RODRICK in January 2022 in setting of MSSA sepsis( osteo), s/p cardiac arrest;required RRT until March 2022 , transferred to [address] [place] [address], now in [place]. Creatinine had normalized in March-April but started to trend up at the end of last week                                     BP on the low side. Was on lisinopril and lasix, now stopped  On iv fluids now, creatinine slowly improving    SUBJECTIVE  Noted hallucinations.   No CP or SOB  Not eating much    SOC: wife at bedside        Scheduled Meds:   gabapentin  100 mg Oral Nightly    HYDROmorphone  0.5 mg Oral Q6H WA    piperacillin-tazobactam  3,375 mg IntraVENous Q8H    traZODone  100 mg Oral Nightly    metoprolol tartrate  12.5 mg Oral BID    mineral oil-hydrophilic petrolatum   Topical Daily    insulin lispro  0-18 Units SubCUTAneous TID WC    insulin lispro  0-9 Units SubCUTAneous Nightly    insulin lispro  4 Units SubCUTAneous TID WC    insulin glargine  25 Units SubCUTAneous Nightly    metoclopramide  10 mg Oral 4x Daily AC & HS    [Held by provider] doxycycline hyclate  100 mg Oral BID    fluticasone  2 spray Each Nostril Nightly    heparin (porcine)  5,000 Units SubCUTAneous 3 times per day    magnesium oxide  800 mg Oral TID    OLANZapine  5 mg Oral Nightly    pantoprazole  40 mg Oral BID    PARoxetine  20 mg Oral Daily    multivitamin  1 tablet Oral Daily    senna  2 tablet Oral BID    tamsulosin  0.4 mg Oral Nightly    tiZANidine  8 mg Oral BID     Continuous Infusions:   sodium chloride 100 mL/hr at 04/27/22 1132    dextrose       PRN Meds:oxyCODONE, oxyCODONE, prochlorperazine, ondansetron, ondansetron, hydrALAZINE, acetaminophen, diclofenac sodium, glucose, glucagon (rDNA), dextrose, bisacodyl, aluminum & magnesium hydroxide-simethicone, polyethylene glycol, dextrose bolus (hypoglycemia) **OR** dextrose bolus (hypoglycemia)      Objective:      Physical Exam  Wt Readings from Last 3 Encounters:   04/26/22 211 lb 11.2 oz (96 kg)   03/10/22 255 lb 1.6 oz (115.7 kg)   10/14/21 282 lb (127.9 kg)     Temp Readings from Last 3 Encounters:   04/28/22 98 °F (36.7 °C) (Oral)   03/10/22 98.5 °F (36.9 °C) (Oral)   03/11/21 98.1 °F (36.7 °C)     BP Readings from Last 3 Encounters:   04/28/22 (!) 166/103   03/10/22 (!) 175/88   03/08/22 (!) 98/56     Pulse Readings from Last 3 Encounters:   04/28/22 103   03/10/22 106   10/14/21 70   Gen: NAD  HEENT: Normocephalic, atraumatic  CV: Regular rate and rhythm , no rub  Resp: No respiratory distress.  CTAB  Abd: Soft, nontender, nondistended  Ext: No CCE  Skin: [place] to sacrum and L foot   Neuro: non-focal        Lab Review   Lab Results   Component Value Date    WBC 7.8 04/28/2022    HGB 9.2 (L) 04/28/2022    HCT 26.3 (L) 04/28/2022    MCV 92.5 04/28/2022     04/28/2022     Lab Results   Component Value Date     04/28/2022    K 5.2 04/28/2022     04/28/2022    CO2 25 04/28/2022    BUN 31 04/28/2022    CREATININE 1.6 04/28/2022    GLUCOSE 215 04/28/2022    CALCIUM 10.7 04/28/2022              Patient Active Problem List    Diagnosis Date Noted    Moderate malnutrition ([facility] 04/14/2022    Critical illness myopathy 04/13/2022    MSSA bacteremia     ABLA (acute blood loss anemia)     Gastrointestinal hemorrhage     Probable abscess of upper lobe of right lung without pneumonia ([facility]     Diabetic ulcer of left midfoot associated with type 2 diabetes mellitus, with necrosis of bone ([facility] 02/21/2022    Duodenal ulcer 02/17/2022    Paroxysmal atrial fibrillation (HCC)     Severe protein-calorie malnutrition ([facility] 02/08/2022    Pressure injury of deep tissue of sacral region 02/07/2022    Antibiotic-associated diarrhea 02/01/2022    Staph aureus infection 01/23/2022    Third degree burn of left hand 01/23/2022    Acute renal failure ([facility] 01/22/2022    Acute osteomyelitis of left foot ([facility] 10/26/2020    Allergic rhinitis 07/26/2020    Osteoarthritis     Mixed hyperlipidemia 04/26/2016    Cardiomyopathy ([facility] 09/11/2014    Hypertension     Uncontrolled type 2 diabetes mellitus with diabetic polyneuropathy ([facility]        ASSESSMENT AND PLAN   #RODRICK- possibly pre-renal , in setting of poor po intake , hypotension  -continue iv fluids  -stopped ACEI, lasix  #H/O recent RODRICK requiring RRT through 3/1/22, creatinine normalized at the time     #Hyperkalemia  -stopped lisinopril  -lokelma, iv fluids     #MSSA bacteremia with left foot abscess, left hand abscess, osteomyelitis, RUL abscess  - oral doxycycline to continue through 4/28-today  -noted dr Jatin Florence re-consulted.     #Multiple wounds   - wound vac to sacral wound and left foot wound  - wound care    #HTN  - off  lisinopril  -increase the metoprolol today     #DM2   - lantus 25, added prandial 4U, SSI  - home regimen lantus 75, prandial 15, SSI     #Anemia  - recent GIB s/p embolization of gastroduodenal artery on 2/25 and ex-lap  - monitor and transfuse for Hb<7     # H/O Atrial Fibrillation  - on  lopressor  - AC contraindicated due to bleeding risk    #MS changes; not likely to be due to RODRICK  -decreased the Neurontin dose to 100 mg every evening

## 2022-04-29 NOTE — PROGRESS NOTES
04/29/22 1040   Oxygen Therapy/Pulse Ox   Blood Gas  Performed? Yes   $ABG $ABG   Ajck's Test #1 Pos   Site #1 Left Radial   Site Prepped #1 Yes   Number of Attempts #1 1   Pressure Held #1 Yes   Complications #1 None   Post-procedure #1 Standard   Specimen Status #1 To lab   How Tolerated?  Tolerated well

## 2022-04-29 NOTE — PROGRESS NOTES
Infectious Disease Follow up Notes    CC :  Disseminated MSSA infection; encephalopathy      Antibiotics:   None     Admit Date:   4/13/2022  Hospital Day: 17    Subjective:   Case d/w RN and pt's mother at bedside; patient cannot contribute to the history. No diarrhea.   Munoz in place  On RA, VSS  No fever    Objective:     Patient Vitals for the past 8 hrs:   BP Temp Temp src Pulse Resp SpO2   04/29/22 1051 130/88 98 °F (36.7 °C) Oral 81 17 93 %   04/29/22 0745 (!) 141/77 98 °F (36.7 °C) Oral 90 17 93 %       EXAM:  General:  Lethargic, rouses to voice and able to follow some simple commands    HEENT:   PERRL, sclera anicteric  No conjunctival petechiae  OP dry, no thrush    NECK:    Supple, symmetrical      LUNGS:   CTA upper lobes dax   CV:   RRR without murmur  ABD: Soft, NT, +BS  EXT: No acute rash  L hand unchanged  L foot dressed        LINE:   PICC RUE in place         Scheduled Meds:   [Held by provider] QUEtiapine  25 mg Oral BID    [Held by provider] gabapentin  100 mg Oral Nightly    [Held by provider] traZODone  100 mg Oral Nightly    metoprolol tartrate  12.5 mg Oral BID    mineral oil-hydrophilic petrolatum   Topical Daily    insulin lispro  0-18 Units SubCUTAneous TID WC    insulin lispro  0-9 Units SubCUTAneous Nightly    insulin lispro  4 Units SubCUTAneous TID WC    insulin glargine  25 Units SubCUTAneous Nightly    metoclopramide  10 mg Oral 4x Daily AC & HS    fluticasone  2 spray Each Nostril Nightly    heparin (porcine)  5,000 Units SubCUTAneous 3 times per day    magnesium oxide  800 mg Oral TID    pantoprazole  40 mg Oral BID    PARoxetine  20 mg Oral Daily    multivitamin  1 tablet Oral Daily    tamsulosin  0.4 mg Oral Nightly    [Held by provider] tiZANidine  8 mg Oral BID       Continuous Infusions:   sodium chloride 100 mL/hr at 04/28/22 2100    dextrose            Data Review:    Lab Results   Component Value Date    WBC 6.8 04/29/2022    HGB 9.2 (L) 04/29/2022    HCT 26.4 (L) 04/29/2022    MCV 93.7 04/29/2022     04/29/2022     Lab Results   Component Value Date    CREATININE 1.5 (H) 04/29/2022    BUN 22 (H) 04/29/2022     04/29/2022    K 4.3 04/29/2022     04/29/2022    CO2 24 04/29/2022       Hepatic Function Panel:   Lab Results   Component Value Date    ALKPHOS 217 02/10/2022    ALT <5 02/10/2022    AST 8 02/10/2022    PROT 6.0 02/10/2022    BILITOT 0.5 02/10/2022    BILIDIR 0.3 02/10/2022    IBILI 0.2 02/10/2022    LABALBU 3.6 04/29/2022       Cultures:   4/27     BC x2 NGTD              UC NGTD         Radiology Review:  All pertinent images / reports were reviewed as a part of this visit. CXR 4/27/22  Impression   Low lung volumes, with subtle bilateral perihilar opacities concerning for   multifocal pneumonia        MRI L shoulder wo franck 3/31/22  Limited study quality secondary to motion degradation. The most significant finding is diffuse edema-like signal throughout the visualized shoulder musculature. Finding are nonspecific, however differential considerations include posttraumatic etiology, rhabdomyolysis, infectious or inflammatory myositis. Tiny interstitial tear of the supraspinatus tendon. No full-thickness retracted tear. Mild acromioclavicular joint degenerative change. Small cortical irregularity along the medial humeral head, nonspecific but may reflect a small erosion.        Assessment:     Patient Active Problem List    Diagnosis Date Noted    Moderate malnutrition ([address] 04/14/2022    Critical illness myopathy 04/13/2022    MSSA bacteremia     ABLA (acute blood loss anemia)     Gastrointestinal hemorrhage     Probable abscess of upper lobe of right lung without pneumonia ([address]     Diabetic ulcer of left midfoot associated with type 2 diabetes mellitus, with necrosis of bone ([address] 02/21/2022    Duodenal ulcer 02/17/2022    Paroxysmal atrial fibrillation (HCC)     Severe protein-calorie malnutrition ([facility] 02/08/2022    Pressure injury of deep tissue of sacral region 02/07/2022    Antibiotic-associated diarrhea 02/01/2022    Staph aureus infection 01/23/2022    Third degree burn of left hand 01/23/2022    Acute renal failure ([facility] 01/22/2022    Acute osteomyelitis of left foot ([facility] 10/26/2020    Allergic rhinitis 07/26/2020    Osteoarthritis     Mixed hyperlipidemia 04/26/2016    Cardiomyopathy ([facility] 09/11/2014    Hypertension     Uncontrolled type 2 diabetes mellitus with diabetic polyneuropathy (HCC)        Complicated course following admission 1/2022 with sepsis, MSSA bacteremia  -L foot infection and OM with background of Charcot arthropathy, s/p serial I&D  -L hand abscess s/p I&D  -Severe GIB req ex-lap, oversew duodenal a and pyloroplasty   -S/p lap choley      Acute mental status change, lethargy   Without fever or leukocytosis   Today, lactic and ammonia are wnl, WBC remains wnl  Pyuria but insensitive for infection in context of catheterization. BC and UC are running negative to date      L shoulder pain  MRI 4 weeks ago with ? changes of myositis   Metastatic staphylococcal infection is always a concerns in this context though there was no effusion noted  Repeat CK ordered.   Defer to PMR re need of Ortho evaluation      RODRICK - recovered, stable      Pressure wounds without local signs of infection      Listed allergy to vanc    Plan:     I don't see evidence of infection as etiology of acute encephalopathy  CT head was ordered  CK pending       Discussed with patient/family, all questions answered        Bacilio Thrasher MD  Phone: [phone]   Fax : [phone]

## 2022-04-29 NOTE — PROGRESS NOTES
Occupational/Physical Therapy Missed Note    OT/PT cotx attempted however per RN patient is very confused and hallucinating, unable to be redirected and intermittently agitated. Therefore RN requests to HOLD therapy this date, spoke with MD who confirmed. Will continue to follow and attempt therapy if patient is medically appropriate and available. Thank you! LILIANA Murdock, OTR/L      Addendum for PM sessions:  Pt remains a MEDICAL HOLD for today 4/29 due to increased confusion. Hospitalist NP, Nephrology, Internal Medicine, and Infectious disease have all been in to speak with family and assess patient. MD and RN made aware of patients status with therapy session and inability to complete due to confusion, hallucinations, and medical status.     LILIANA Prince, OTR/L    Minute variance: 90 minutes OT and 90 Minutes PT due to medical hold

## 2022-04-29 NOTE — CONSULTS
Hospital Medicine History & Physical      PCP: Garrett Briggs MD    Date of Admission: 4/13/2022    Date of Service: Pt seen/examined on 4/29/2022  with expected LOS greater than two midnights     Chief Complaint:  Encephalopathy      History Of Present Illness:      42 y.o. male with a PMH of Diabetes mellitus type 2 poorly controlled, CHF, history of diabetic ulcer with amputation of the right great toe, Obesity, Neuropathy, CM and history of tobacco abuse who was [facility] to [facility] 1/22[facility]22 with Sepsis/RODRICK/MSSA bacteremia-from left foot infection, Resp failure which progressed to cardiac arrest. He was d/c'd to a LTACH on 3/10/2022. Admitted to ARU on 4/13/2022. Hospitalist consulted due mental status change. Patient reportedly easily agitated and hallucinating.      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 myocarditis    Chronic osteomyelitis of left foot ([address] 10/27/2020    Closed displaced fracture of distal phalanx of right little finger 4/8/2021    Clostridium difficile infection 11/01/2016    Diabetic ulcer of left forefoot associated with type 2 diabetes mellitus, with necrosis of bone ([address] 8/1/2019    Diabetic ulcer of right great toe associated with type 2 diabetes mellitus, with necrosis of bone ([address] 08/2019    Enterococcal infection 2/3/2022    Failed soft 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 toe amputation site 10/26/2020    Hemodialysis patient [facility])     acute dialysis while in hospital. resolved    History of blood transfusion     History of hyperbaric oxygen therapy 10/28/2020    DFU- [place] [address] - compromised flap    Hyperlipidemia     Hypertension     Infective tenosynovitis of extensor tendons of left hand 2/3/2022    Migraine     MSSA bacteremia 1/23/2022    Possible perforated tympanic membrane     as a result of recurrent otitis    Post-op hematoma of left foot 11/12/2020    Recurrent otitis media     Septic shock (HCC)     Staphylococcal arthritis of left foot ([address] 2/1/2022    Syncope     Tear of medial meniscus of left knee     Tobacco use     Toe osteomyelitis, left ([facility] 7/24/2020    VAP (ventilator-associated pneumonia) [facility])        Past Surgical History:          Procedure Laterality Date    ARM SURGERY Left 02/05/2022    LEFT HAND INCISION AND DRAINAGE performed by Demarco Vera MD at [address], ESOPHAGUS      bleeding ulcer correction    ENDOSCOPY, COLON, DIAGNOSTIC      FOOT DEBRIDEMENT Left 02/01/2022    ULCER DEBRIDEMENT LEFT FOOT performed by Christos Wang DPM at [address] Left 3/8/2022    ULCER DEBRIDEMENT LEFT FOOT performed by [facility] at [facility] [address] Right 02/25/2022    successful coil embolization of the gastroduodenal artery    IR NONTUNNELED VASCULAR CATHETER  02/11/2022    IR NONTUNNELED VASCULAR CATHETER 2/11/2022 [facility]    KNEE ARTHROSCOPY Left 08/15/2013    LEFT KNEE ARTHROSCOPY , SYNOVECTOMY       LAPAROTOMY N/A 2/27/2022    PYLOROPLASTY, ULCER OVER-SEW, JEJUNOSTOMY TUBE INSERTION performed by Sheldon Ramachandran MD at [address] Left 07/24/2020    PARTIAL LEFT FOOT AMPUTATION    TOE AMPUTATION Right 08/23/2019    PARTIAL RIGHT FOOT AMPUTATION performed by Christos Wang DPM at [facility] Left 07/24/2020    PARTIAL LEFT FOOT AMPUTATION performed by Christos Wang DPM at [facility] Left 10/22/2020    PARTIAL LEFT FOOT AMPUTATION WITH GRAFT APPLICATION performed by Christos Wang DPM at [address] [place] [address]  UPPER GASTROINTESTINAL ENDOSCOPY N/A 02/17/2022    EGD W/ANES. performed by Grzegorz Nguyen MD at [place] [id number] N/A 2/27/2022    EGD DIAGNOSTIC ONLY performed by Demetris Lawrence MD at [address]         Medications Prior to Admission:      Prior to Admission medications    Medication Sig Start Date End Date Taking? Authorizing Provider   gabapentin (NEURONTIN) 100 MG capsule Take 2 capsules by mouth 3 times daily for 30 days. 3/10/22 4/9/22  Michelle Wood MD   insulin lispro (HUMALOG) 100 UNIT/ML injection vial Inject 0-18 Units into the skin 4 times daily (before meals and nightly) 3/10/22   Michelle Wood MD   dilTIAZem (CARDIZEM CD) 120 MG extended release capsule Take 1 capsule by mouth daily 3/10/22   Michelle Wood MD   pantoprazole (PROTONIX) 40 MG tablet Take 1 tablet by mouth 2 times daily (before meals) 3/10/22   Michelle Wood MD   sucralfate (CARAFATE) 1 GM tablet Take 1 tablet by mouth 4 times daily 3/10/22   Michelle Wood MD   insulin glargine (LANTUS SOLOSTAR) 100 UNIT/ML injection pen Inject 10 Units into the skin nightly 3/10/22   Michelle Wood MD   tiZANidine (ZANAFLEX) 4 MG tablet TAKE 2 TABLETS BY MOUTH NIGHTLY 1/4/22   Mabel Felty, MD   carvedilol (COREG) 12.5 MG tablet TAKE 1 TABLET BY MOUTH 2 TIMES DAILY (WITH MEALS) 12/30/21   Mabel Felty, MD   rosuvastatin (CRESTOR) 20 MG tablet TAKE 1 TABLET BY MOUTH NIGHTLY 12/30/21   Michelle Wood MD   traZODone (DESYREL) 50 MG tablet TAKE 1 TABLET BY MOUTH NIGHTLY 12/7/21   Mabel Felty, MD   PARoxetine (PAXIL) 10 MG tablet TAKE 1 TABLET BY MOUTH EVERY MORNING 9/10/21   Mabel Felty, MD   blood glucose test strips (ONETOUCH ULTRA) strip USE TO TEST BLOOD GLUCOSE DAILY.  DX:E11.9 3/5/21   Mabel Felty, MD   Blood Glucose Monitoring Suppl (CONTOUR NEXT EZ) w/Device KIT Use to test glucose daily. DX:E11.9 12/10/20   Malcolm Lyons MD   Insulin Pen Needle 32G X 6 MM MISC Use with insulin daily . DX:E11.9 12/4/20   Malcolm Lyons MD   blood glucose monitor strips Use to test blood sugar daily. DX:E11.9 12/4/20   Malcolm Lyons MD   loratadine (CLARITIN) 10 MG tablet Take 10 mg by mouth nightly as needed     Historical Provider, MD       Allergies:  Januvia [sitagliptin], Metformin and related, Vancomycin, and Mustard oil [allyl isothiocyanate]    Social History:      The patient currently lives at home    TOBACCO:   reports that he quit smoking about 16 years ago. He has a 1.00 pack-year smoking history. He quit smokeless tobacco use about 16 years ago. ETOH:   reports previous alcohol use. E-Cigarettes/Vaping Use     Questions Responses    E-Cigarette/Vaping Use Never User    Start Date     Passive Exposure     Quit Date     Counseling Given     Comments             Family History:      Reviewed in detail and negative for DM, CAD, Cancer, CVA. Positive as follows:        Problem Relation Age of Onset    Diabetes Mother     High Blood Pressure Mother     High Cholesterol Mother     Diabetes Father     High Blood Pressure Father     High Cholesterol Father     Stroke Father     High Blood Pressure Sister     High Blood Pressure Maternal Uncle     High Cholesterol Maternal Uncle     High Blood Pressure Maternal Grandmother        REVIEW OF SYSTEMS COMPLETED:   Pertinent positives as noted in the HPI. All other systems reviewed and negative. PHYSICAL EXAM PERFORMED:    /88   Pulse 81   Temp 98 °F (36.7 °C) (Oral)   Resp 17   Ht 6' 0.5\" (1.842 m)   Wt 211 lb 11.2 oz (96 kg)   SpO2 93%   BMI 28.32 kg/m²     General appearance: Obese. No apparent distress, appears stated age and cooperative during assessment  HEENT:  Normal cephalic, atraumatic without obvious deformity. Pupils equal, round, and reactive to light.   Extra ocular muscles intact. Conjunctivae/corneas clear. Neck: Supple, with full range of motion. No jugular venous distention. Trachea midline. Respiratory:  Normal respiratory effort. Clear to auscultation, bilaterally without Rales/Wheezes/Rhonchi. Cardiovascular:  Regular rate and rhythm with normal S1/S2 without murmurs, rubs or gallops. Abdomen: Soft, non-tender, non-distended with normal bowel sounds. Musculoskeletal:  No clubbing, cyanosis or edema bilaterally. Full range of motion without deformity. Skin: Skin color-multiple areas of healing wounds on LE, sacral ulcer  Neurologic:  No focal deficits, MICHELE  Psychiatric:  Alert and oriented x3, poor insight, hallucinating, easily agitated, Drowsy, follows commands  Capillary Refill: Brisk,3 seconds, normal  Peripheral Pulses: +2 palpable, equal bilaterally       Labs:     Recent Labs     04/27/22  1304 04/28/22  0711 04/29/22  0820   WBC 10.7 7.8 6.8   HGB 8.6* 9.2* 9.2*   HCT 25.3* 26.3* 26.4*    310 278     Recent Labs     04/27/22  0702 04/28/22  0711 04/29/22  0820   * 139 138   K 4.9 5.2* 4.3   CL 96* 101 104   CO2 26 25 24   BUN 38* 31* 22*   CREATININE 1.8* 1.6* 1.5*   CALCIUM 10.8* 10.7* 10.3   PHOS 5.7*  --  4.2     No results for input(s): AST, ALT, BILIDIR, BILITOT, ALKPHOS in the last 72 hours. No results for input(s): INR in the last 72 hours. No results for input(s): [place] in the last 72 hours. Urinalysis:      Lab Results   Component Value Date    NITRU Negative 04/27/2022    WBCUA 21-50 04/27/2022    BACTERIA Rare 01/22/2022    RBCUA 3-4 04/27/2022    BLOODU TRACE-INTACT 04/27/2022    SPECGRAV 1.010 04/27/2022    GLUCOSEU Negative 04/27/2022       Radiology:     CXR: 4/27 I have reviewed the CXR with the following interpretation: Low lung volumes, with subtle bilateral perihilar opacities  concerning for multifocal pneumonia.        XR CHEST PORTABLE   Preliminary Result   Low lung volumes, with subtle bilateral perihilar opacities concerning for   multifocal pneumonia. US RENAL COMPLETE   Final Result   Unremarkable ultrasound of the kidneys and urinary bladder. CT HEAD WO CONTRAST    (Results Pending)       ASSESSMENT:    Active Hospital Problems    Diagnosis Date Noted    Moderate malnutrition ([facility] [E44.0] 04/14/2022    Critical illness myopathy [G72.81] 04/13/2022         PLAN:    Delirium, multifactorial prolonged hospitalization, medications, infection  -WBC nl, LA/ammonia nl, afebrile  -Finished Doxy 4/27, Zosyn 2 doses 4/27-4/28  -ABG stable, CK 23  -Hold neurontin, zanaflex, desyrel  -Psych consult  -Added Seroquel 100 nightly and BID  -Vit B12/Folate/TSH pending  -4/27 BC x2 no growth to date  -4/27 Ngtd urine cx, indwelling rea  -s/p lap cholecystectomy   -CT head no acute abnormality  -L/D: midline, rea    DM  -FSBS AC/HS  -Lantus, SSI    RODRICK, improving  -nephrology following    Multiple wounds: sacral stage IV-wound vac, left foot wound    HTN-lisinopril held    PAF-now in SR, no AC d/t bleeding risk    PT/OT     ACP: full code    DVT Prophylaxis: heparin  Diet: ADULT DIET; Regular; 4 carb choices (60 gm/meal)  ADULT ORAL NUTRITION SUPPLEMENT; Breakfast, Dinner, Lunch; Low Calorie/High Protein Oral Supplement  Code Status: Full Code    PT/OT Eval Status: consulted (ARU)    Dispo - several days       Kai uQiles, APRN - CNP    Thank you Daniela Walker MD for the opportunity to be involved in this patient's care. If you have any questions or concerns please feel free to contact me at [phone].

## 2022-04-29 NOTE — PROGRESS NOTES
Zaid Brain  4/29/2022  [id number]    Chief Complaint: Critical illness myopathy    Subjective:   No acute events overnight. Today Braxton Henson is seen in his room with nursing present. He has had worsening encephalopathy and hallucinations over the past several days. He is currently hallucinating and becoming agitated. ROS: unable to reliably obtain    Objective:  Patient Vitals for the past 24 hrs:   BP Temp Temp src Pulse Resp SpO2   04/29/22 1051 130/88 98 °F (36.7 °C) Oral 81 17 93 %   04/29/22 0745 (!) 141/77 98 °F (36.7 °C) Oral 90 17 93 %   04/28/22 2045 123/76 98.2 °F (36.8 °C) Oral 95 16 96 %     Gen: Seated up in bed, agitated  HEENT: Normocephalic, atraumatic  CV: extremities well perfused  Resp: No respiratory distress. Abd: Soft, nontender, nondistended  Ext: No edema  Skin: WV to sacrum and L foot with appropriate seal   Neuro: Alert, oriented to person, place, year, and situation.    Psych: hallucinating tools in his bed    Wt Readings from Last 3 Encounters:   04/26/22 211 lb 11.2 oz (96 kg)   03/10/22 255 lb 1.6 oz (115.7 kg)   10/14/21 282 lb (127.9 kg)       Laboratory data:   Lab Results   Component Value Date    WBC 6.8 04/29/2022    HGB 9.2 (L) 04/29/2022    HCT 26.4 (L) 04/29/2022    MCV 93.7 04/29/2022     04/29/2022       Lab Results   Component Value Date     04/29/2022    K 4.3 04/29/2022     04/29/2022    CO2 24 04/29/2022    BUN 22 04/29/2022    CREATININE 1.5 04/29/2022    GLUCOSE 148 04/29/2022    CALCIUM 10.3 04/29/2022        Therapy progress:  PT  Position Activity Restriction  Other position/activity restrictions: RUE PICC, sacral wound vac  Objective     Sit to Stand: Maximum Assistance,Dependent/Total,2 Person Assistance  Stand to sit: Maximum Assistance,Dependent/Total,2 Person Assistance  Bed to Chair: Dependent/Total     OT  PT Equipment Recommendations  Other: TBD pending progress and increasead functional mobility assessment; likely w/c     Assessment SLP  Current Diet : Regular  Current Liquid Diet : Thin  Diet Solids Recommendation: Regular  Liquid Consistency Recommendation: Thin    Body mass index is 28.32 kg/m². Assessment and Plan:  Johnathon Wagner is a 42year old male with a past medical history significant for DM2, diabetic foot ulcer with multiple amputations, HFrEF who has had a prolonged hospital course following Covid pneumonia with complications including respiratory failure, GIB, and multiple infections. He was admitted to [facility] on 4/13/22 due to functional deficits below his baseline.      Critical Illness Myopathy  - due to covid pneumonia  - PT, OT, ST     MSSA bactermia  - with left foot abscess, left hand abscess, osteomyelitis, RUL abscess  - transitioned to oral doxycycline to continue through 4/28     Multiple wounds POA  - wound vac to sacral wound and left foot wound  - wound care    Encephalopathy  - etiology unclear. Workup not revealing of infectious, metabolic or intracranial etiology. - wbc, ammonia, electrolytes, lactic acid, and ABG within normal limits  - CT head negative for acute process  - ID following, low suspicion for infection  - possibly polypharmacy? Although patient had been stable on pain medications when hallucinations and encephalopathy developed. Have wean down and discontinued scheduled dilaudid. Possibly worsening AMS due to pain and decreased pain medications  - Psych and Medicine consulted, appreciate assistance     HFrEF  - EF 35%  - discontinued lasix due to RODRICK, patient remains euvolemic on exam  - electrolyte replacement, wean as able since patient is no longer on lasix  - daily weights     RODRICK on CKD  - RODRICK due to sepsis, cardiac arrest. Required dialysis during acute stay  - Cr trending down.  Previously been in 1-1.2 range  - stopped lasix  - fluids per Nephrology, appreciate assistance    Hyperkalemia  - suspect due to kidney dysfunction  - s/p lokemia 4/26  - Nephro following    Atrial Fibrillation  - BB  - AC contraindicated due to bleeding risk    HTN  - lopressor 12.5 mg BID  - discontinued lisinopril     DM2 complicated by neuropathy  - lantus 25, prandial 4U, SSI  - home regimen lantus 75, prandial 15, SSI     Diabetic Neuropathy  - gabapentin discontinued     Acute blood loss anemia  - recent GIB s/p embolization of gastroduodenal artery on 2/25 and ex-lap  - monitor and transfuse for Hb<7     History of GIB  - PPI     Nausea  - PRN zofran and compazine  - reglan     Urinary Retention  - flomax  - ISC with high volumes, rea replaced  - will need follow up with Urology     Chronic Pain  - discontinued dilaudid due to AMS  - oxycodone PRN     Bowel: Per protocol  Bladder: Per protocol  Sleep: Trazodone PRN  DVT PPx: heparin    Services/DME: home PT, OT, nursing  EDOD: 5/3     Follow up appointments: PCP, Cardiology, wound care    Electronically signed by Rosibel Whitehead MD on 4/29/2022 at 3:28 PM

## 2022-04-29 NOTE — PLAN OF CARE
Patient's confusion continues to increase. Patient hallucinating that he has his tools in his bed and that people are in the room that aren't. Patient becoming more agitated and impulsive as the shift goes on. On the waiting list currently for an avasys.  Virgilio Esparza RN

## 2022-04-29 NOTE — PROGRESS NOTES
[facility] Nurse  Follow-up Progress Note       NAME:  Kerri Rubio  MEDICAL RECORD NUMBER:  [id number]  AGE:  42 y.o. GENDER:  male  :  1975  TODAY'S DATE:  2022    Subjective: Hay what are all those machinery parts doing out there? Here let me give you this. ( Hands off imaginary item). Wound Identification:  Wound Type: traumatic left foot.  Healing stage 4 pressure injury coccyx/sacrum;  Fluid filled blister left posterior hand    Contributing Factors:  edema, diabetes, chronic pressure, decreased mobility, shear force, obesity, incontinence of stool and incontinence of urine        Patient Goal of Care:  [x] Wound Healing  [] Odor Control  [] Palliative Care  [x] Pain Control   [] Other:     Objective: IV right arm infusing Heparin. Patient confused, seeing objects not present. VAC dressing secure. Left foot dressing loosened wrapped ariaze    BP (!) 141/77 Comment: RN notified  Pulse 90   Temp 98 °F (36.7 °C) (Oral)   Resp 17   Ht 6' 0.5\" (1.842 m)   Wt 211 lb 11.2 oz (96 kg)   SpO2 93%   BMI 28.32 kg/m²   Shimon Risk Score: Shimon Scale Score: 15  Assessment: Left foot granulation light red in color. Sacrum red with slight bloody seepage at right lateral edges. Granulation red/pink, filling wound base. Tiny area anterior wound base of slough. Left hand once healed, dry area, now has fluid filled blister within same area.    Measurements:  Negative Pressure Wound Therapy Coccyx (Active)   $ Standard NPWT <=50 sq cm PER TX $ Yes 22 1704   $ Standard NPWT >50 sq cm PER TX $ Yes 22 1605   Wound Type Pressure ulcer: Stage IV 22   Unit Type [employer] [id number] 22 09   Dressing Type Black Foam 22 09   Number of pieces used 1 22 0933   Number of pieces removed 1 22 0933   Cycle Continuous 22 0933   Target Pressure (mmHg) 125 22 0933   Intensity 1 22 09   Canister changed? No 04/29/22 0933   Dressing Status New dressing applied 04/29/22 0933   Dressing Changed Changed/New 04/29/22 0933   Drainage Amount Scant 04/29/22 0933   Drainage Description Serosanguinous 04/27/22 1605   Dressing Change Due 05/02/22 04/29/22 0933   Output (ml) 50 ml 04/22/22 1704   Wound Assessment Epithelialization;Granulation tissue; Red 04/29/22 0933   Shape heart shaped 04/29/22 0933   Odor None 04/29/22 0933   Number of days: 85       Wound 01/24/22 Foot Left;Dorsal I & D to left dorsal foot by Dr. Pina Zazueta, surgical wound (Active)   Number of days: 95       Wound 01/30/22 Sacrum SDTI (Active)   Wound Image   04/29/22 0933   Wound Etiology Pressure Stage 4 04/29/22 0933   Dressing Status New dressing applied 04/29/22 0933   Wound Cleansed Cleansed with saline;Irrigated with saline 04/29/22 0933   Dressing/Treatment Negative pressure wound therapy 04/29/22 0933   Offloading for Diabetic Foot Ulcers Offloading ordered; Other (comment) 04/29/22 0933   Dressing Change Due 05/02/22 04/29/22 0933   Wound Length (cm) 9 cm 04/29/22 0933   Wound Width (cm) 5 cm 04/29/22 0933   Wound Depth (cm) 1.4 cm 04/29/22 0933   Wound Surface Area (cm^2) 45 cm^2 04/29/22 0933   Change in Wound Size % (l*w) -125 04/29/22 0933   Wound Volume (cm^3) 63 cm^3 04/29/22 0933   Wound Healing % -363 04/29/22 0933   Distance Tunneling (cm) 0 cm 04/29/22 0933   Tunneling Position ___ O'Clock 0 04/29/22 0933   Undermining Starts ___ O'Clock 0 04/29/22 0933   Undermining Ends___ O'Clock 0 04/29/22 0933   Undermining Maxium Distance (cm) 0 04/29/22 0933   Wound Assessment Pink/red;Granulation tissue 04/29/22 0933   Drainage Amount Small 04/29/22 0933   Drainage Description Serosanguinous 04/29/22 0933   Odor None 04/29/22 0933   Shanita-wound Assessment Dry/flaky; Intact 04/29/22 0933   Margins Attached edges; Defined edges 04/29/22 0933   Wound Thickness Description not for Pressure Injury Full thickness 04/29/22 0933   Number of days: 89 Sacrum        Incision 03/08/22 Foot Anterior; Left (Active)   Wound Image   04/29/22 0933   Dressing Status New dressing applied 04/29/22 0933   Dressing Change Due 05/30/22 04/29/22 0933   Incision Cleansed Cleansed with saline 04/29/22 0933   Dressing/Treatment Barrier film; Moist to moist 04/29/22 0933   Incision Length (cm) 1 04/29/22 0933   Incision Width (cm) 5 cm 04/29/22 0933   Incision Depth (cm) 0.1 cm 04/29/22 0933   Margins Other (Comment) 04/27/22 1605   Incision Assessment Epithelialization 04/29/22 0933   Drainage Amount Small 04/27/22 1605   Drainage Description Sanguinous 04/29/22 0933   Odor None 04/29/22 0933   Shanita-incision Assessment Dry/flaky; Intact 04/29/22 0933   Number of days: 51       Wound 04/29/22 Left;Posterior (Active)   Wound Image   04/29/22 0933   Wound Etiology Other 04/29/22 0933   Dressing Status Other (Comment) 04/29/22 0933   Wound Cleansed Not Cleansed 04/29/22 0933   Dressing/Treatment Barrier film 04/29/22 0933   Offloading for Diabetic Foot Ulcers Offloading ordered 04/29/22 0933   Dressing Change Due 04/30/22 04/29/22 0933   Wound Length (cm) 3 cm 04/29/22 0933   Wound Width (cm) 3 cm 04/29/22 0933   Wound Surface Area (cm^2) 9 cm^2 04/29/22 0933   Distance Tunneling (cm) 0 cm 04/29/22 0933   Tunneling Position ___ O'Clock 0 04/29/22 0933   Undermining Starts ___ O'Clock 0 04/29/22 0933   Undermining Ends___ O'Clock 0 04/29/22 0933   Undermining Maxium Distance (cm) 0 04/29/22 0933   Wound Assessment Fluid filled blister 04/29/22 0933   Drainage Amount None 04/29/22 0933   Shanita-wound Assessment Blanchable erythema 04/29/22 0933   Margins Unattached edges 04/29/22 0933   Wound Thickness Description not for Pressure Injury Partial thickness 04/29/22 0933   Number of days: 0  Left posterior hand     L    Response to treatment:  With complaints of pain.      Pain Assessment:  Severity:  8 / 10  Quality of pain: sharp, aching  Wound Pain Timing/Severity: intermittent  Premedicated: Yes   At start of dressing change. Plan:   Plan of Care: Wound 01/30/22 Sacrum SDTI-Dressing/Treatment: Negative pressure wound therapy  [REMOVED] Wound 02/14/22 Head Left; Lower; Posterior Friction/pressure wound-unstageable. 4/14/22 healed now, will complete LDA per CWOCN-Dressing/Treatment: Open to air  [REMOVED] Wound 01/24/22 Hand Left;Dorsal-Dressing/Treatment: Open to air    Current dressing removed.  Cleaned wounds with normal saline. Left foot wound prepped patricia wound with moisture barrier, then moist to moist dsg applied, dry dressing and secured with roll guaze and tape. One black sponge placed in right and mid coccyx. Left buttocks covered with duoderm dressing.  tracked to left hip. Connected to 125 mmHg low continuous suction.  Wound care to continue to follow.  Call Wound care for problems with seal at [phone] or call [phone] and leave message. Thanks     Recommend:  Clean feet and legs with warm soapy water, foamy cleanser or bath wipes daily.  Pat dry.  Apply aquapor ointment to feet and legs daily.   Clean left foot wound with normal saline. Prep patricia wound with zinc paste or barrier film.  Apply moist to moist dressing daily (avoid putting dsg on healthy skin)  to left foot.  Cover with dry dressing and roll guaze. Continue NPWT dressing to coccyx wound change 3 times a week. Left hand blister cleanse with normal saline, pat dry, apply Barrier film BID. If fluid filled blister deflates apply versitel to blister,  (change versatel every 3 days), & cover with dry dressing and roll guaze,(change dry dressing every daily). Specialty Bed Required : Yes   [x] Low Air Loss   [x] Pressure Redistribution  [] Fluid Immersion  [] Bariatric  [] Total Pressure Relief  [] Other:     Current Diet: ADULT DIET; Regular; 4 carb choices (60 gm/meal)  ADULT ORAL NUTRITION SUPPLEMENT; Breakfast, Dinner, Lunch;  Low Calorie/High Protein Oral Supplement  Dietician consult: Yes    Discharge Plan:  Placement for patient upon discharge: skilled nursing   Patient appropriate for Outpatient [address]: Yes    Referrals:  [x]  following  [] [facility]  [] Supplies  [] Other    Patient/Caregiver Teaching: To reposition and watch tubing,  to prevent pressure areas.     Level of patient/caregiver understanding able to:   [] Indicates understanding       [x] Needs reinforcement  [] Unsuccessful      [] Verbal Understanding  [] Demonstrated understanding       [] No evidence of learning  [] Refused teaching         [] N/A       Electronically signed by Marie Castillo RN, on 4/29/2022 at 9:54 AM

## 2022-04-29 NOTE — PROGRESS NOTES
Nephrology Progress  Note   [employer]       CC:RODRICK  HPI  The patient is a 42 y.o. male  with PMH of CHF,recent RODRICK in January 2022 in setting of MSSA sepsis( osteo), s/p cardiac arrest;required RRT until March 2022 , transferred to [address] [facility] [place], now in [place]. Creatinine had normalized in March-April but started to trend up at the end of last week                                     Was on lisinopril and lasix, now stopped. BP was on the low side at the time  On iv fluids now, creatinine slowly improving    SUBJECTIVE  Noted hallucinations.   No CP or SOB  Not eating much    SOC: mother at bedside        Scheduled Meds:   QUEtiapine  25 mg Oral BID    gabapentin  100 mg Oral Nightly    traZODone  100 mg Oral Nightly    metoprolol tartrate  12.5 mg Oral BID    mineral oil-hydrophilic petrolatum   Topical Daily    insulin lispro  0-18 Units SubCUTAneous TID WC    insulin lispro  0-9 Units SubCUTAneous Nightly    insulin lispro  4 Units SubCUTAneous TID WC    insulin glargine  25 Units SubCUTAneous Nightly    metoclopramide  10 mg Oral 4x Daily AC & HS    fluticasone  2 spray Each Nostril Nightly    heparin (porcine)  5,000 Units SubCUTAneous 3 times per day    magnesium oxide  800 mg Oral TID    pantoprazole  40 mg Oral BID    PARoxetine  20 mg Oral Daily    multivitamin  1 tablet Oral Daily    tamsulosin  0.4 mg Oral Nightly    tiZANidine  8 mg Oral BID     Continuous Infusions:   sodium chloride 100 mL/hr at 04/28/22 2100    dextrose       PRN Meds:senna, haloperidol, oxyCODONE, oxyCODONE, prochlorperazine, ondansetron, ondansetron, hydrALAZINE, acetaminophen, diclofenac sodium, glucose, glucagon (rDNA), dextrose, bisacodyl, aluminum & magnesium hydroxide-simethicone, polyethylene glycol, dextrose bolus (hypoglycemia) **OR** dextrose bolus (hypoglycemia)      Objective:      Physical Exam  Wt Readings from Last 3 Encounters:   04/26/22 211 lb 11.2 oz (96 kg)   03/10/22 255 lb 1.6 oz (115.7 kg)   10/14/21 282 lb (127.9 kg)     Temp Readings from Last 3 Encounters:   04/29/22 98 °F (36.7 °C) (Oral)   03/10/22 98.5 °F (36.9 °C) (Oral)   03/11/21 98.1 °F (36.7 °C)     BP Readings from Last 3 Encounters:   04/29/22 (!) 141/77   03/10/22 (!) 175/88   03/08/22 (!) 98/56     Pulse Readings from Last 3 Encounters:   04/29/22 90   03/10/22 106   10/14/21 70   Gen: NAD  HEENT: Normocephalic, atraumatic  CV: Regular rate and rhythm , no rub  Resp: No respiratory distress.  CTAB  Abd: Soft, nontender, nondistended  Ext: No CCE  Skin: WV to sacrum and L foot   Neuro: non-focal        Lab Review   Lab Results   Component Value Date    WBC 7.8 04/28/2022    HGB 9.2 (L) 04/28/2022    HCT 26.3 (L) 04/28/2022    MCV 92.5 04/28/2022     04/28/2022     Lab Results   Component Value Date     04/29/2022    K 4.3 04/29/2022     04/29/2022    CO2 24 04/29/2022    BUN 22 04/29/2022    CREATININE 1.5 04/29/2022    GLUCOSE 148 04/29/2022    CALCIUM 10.3 04/29/2022              Patient Active Problem List    Diagnosis Date Noted    Moderate malnutrition ([address] 04/14/2022    Critical illness myopathy 04/13/2022    MSSA bacteremia     ABLA (acute blood loss anemia)     Gastrointestinal hemorrhage     Probable abscess of upper lobe of right lung without pneumonia ([address]     Diabetic ulcer of left midfoot associated with type 2 diabetes mellitus, with necrosis of bone ([address] 02/21/2022    Duodenal ulcer 02/17/2022    Paroxysmal atrial fibrillation (HCC)     Severe protein-calorie malnutrition ([facility] 02/08/2022    Pressure injury of deep tissue of sacral region 02/07/2022    Antibiotic-associated diarrhea 02/01/2022    Staph aureus infection 01/23/2022    Third degree burn of left hand 01/23/2022    Acute renal failure ([facility] 01/22/2022    Acute osteomyelitis of left foot ([facility] 10/26/2020    Allergic rhinitis 07/26/2020    Osteoarthritis     Mixed hyperlipidemia 04/26/2016    Cardiomyopathy ([facility] 09/11/2014    Hypertension     Uncontrolled type 2 diabetes mellitus with diabetic polyneuropathy ([facility]        ASSESSMENT AND PLAN   #RODRICK- possibly pre-renal , in setting of poor po intake , hypotension  -improving now with iv fluids  -stopped ACEI, lasix  #H/O recent RODRICK requiring RRT through 3/1/22, creatinine normalized at the time     #Hyperkalemia  -stopped lisinopril  -improved      #MSSA bacteremia with left foot abscess, left hand abscess, osteomyelitis, RUL abscess  - finished oral doxycycline 4/28       #Multiple wounds   - wound vac to sacral wound and left foot wound  - wound care    #HTN  - off  lisinopril  -increase the metoprolol today     #DM2   - insulin         #Anemia  - recent GIB s/p embolization of gastroduodenal artery on 2/25 and ex-lap  - monitor and transfuse for Hb<7     # H/O Atrial Fibrillation  - on  lopressor  - AC contraindicated due to bleeding risk    #MS changes; not likely to be due to RODRICK  -decreased the Neurontin dose to 100 mg every evening

## 2022-04-30 NOTE — PROGRESS NOTES
Nephrology Progress  Note   [facility]       CC:RODRICK  HPI  The patient is a 42 y.o. male  with PMH of CHF,recent RODRICK in January 2022 in setting of MSSA sepsis( osteo), s/p cardiac arrest;required RRT until March 2022 , transferred to [id number] [place] [address], now in [place]. Creatinine had normalized in March-April but started to trend up at the end of last week                                     SUBJECTIVE  Was on lisinopril and lasix, now stopped. BP was on the low side at the time  On iv fluids now off, creatinine slowly improving with SCr in mid 1 range. Bp's running high now. Denies any shortness of breath. ROS:  Intake reduced, +weak.         Scheduled Meds:   [START ON 5/1/2022] metoprolol succinate  25 mg Oral Daily    magnesium oxide  400 mg Oral BID    QUEtiapine  100 mg Oral Nightly    QUEtiapine  25 mg Oral BID    [Held by provider] gabapentin  100 mg Oral Nightly    [Held by provider] traZODone  100 mg Oral Nightly    mineral oil-hydrophilic petrolatum   Topical Daily    insulin lispro  0-18 Units SubCUTAneous TID WC    insulin lispro  0-9 Units SubCUTAneous Nightly    insulin lispro  4 Units SubCUTAneous TID WC    insulin glargine  25 Units SubCUTAneous Nightly    metoclopramide  10 mg Oral 4x Daily AC & HS    fluticasone  2 spray Each Nostril Nightly    heparin (porcine)  5,000 Units SubCUTAneous 3 times per day    pantoprazole  40 mg Oral BID    PARoxetine  20 mg Oral Daily    multivitamin  1 tablet Oral Daily    tamsulosin  0.4 mg Oral Nightly    [Held by provider] tiZANidine  8 mg Oral BID     Continuous Infusions:   dextrose       PRN Meds:senna, haloperidol, QUEtiapine, oxyCODONE, oxyCODONE, prochlorperazine, ondansetron, ondansetron, hydrALAZINE, acetaminophen, diclofenac sodium, glucose, glucagon (rDNA), dextrose, bisacodyl, aluminum & magnesium hydroxide-simethicone, polyethylene glycol, dextrose bolus (hypoglycemia) **OR** dextrose bolus (hypoglycemia)      Objective:      Physical Exam  Wt Readings from Last 3 Encounters:   04/26/22 211 lb 11.2 oz (96 kg)   03/10/22 255 lb 1.6 oz (115.7 kg)   10/14/21 282 lb (127.9 kg)     Temp Readings from Last 3 Encounters:   04/30/22 97 °F (36.1 °C) (Oral)   03/10/22 98.5 °F (36.9 °C) (Oral)   03/11/21 98.1 °F (36.7 °C)     BP Readings from Last 3 Encounters:   04/30/22 (!) 156/89   03/10/22 (!) 175/88   03/08/22 (!) 98/56     Pulse Readings from Last 3 Encounters:   04/30/22 99   03/10/22 106   10/14/21 70   Gen: NAD  HEENT: Normocephalic, atraumatic  CV: Regular rate and rhythm , no rub  Resp: No respiratory distress.  CTAB  Abd: Soft, nontender, nondistended  Ext: No CCE  Skin: [place] to sacrum and L foot   Neuro: non-focal    Lab Review   Lab Results   Component Value Date    WBC 6.8 04/29/2022    HGB 9.2 (L) 04/29/2022    HCT 26.4 (L) 04/29/2022    MCV 93.7 04/29/2022     04/29/2022     Lab Results   Component Value Date     04/30/2022    K 4.3 04/30/2022     04/30/2022    CO2 24 04/30/2022    BUN 19 04/30/2022    CREATININE 1.5 04/30/2022    GLUCOSE 148 04/30/2022    CALCIUM 11.1 04/30/2022        Patient Active Problem List    Diagnosis Date Noted    Moderate malnutrition ([facility] 04/14/2022    Critical illness myopathy 04/13/2022    MSSA bacteremia     ABLA (acute blood loss anemia)     Gastrointestinal hemorrhage     Probable abscess of upper lobe of right lung without pneumonia ([facility]     Diabetic ulcer of left midfoot associated with type 2 diabetes mellitus, with necrosis of bone ([facility] 02/21/2022    Duodenal ulcer 02/17/2022    Paroxysmal atrial fibrillation (HCC)     Severe protein-calorie malnutrition ([facility] 02/08/2022    Pressure injury of deep tissue of sacral region 02/07/2022    Antibiotic-associated diarrhea 02/01/2022    Staph aureus infection 01/23/2022    Third degree burn of left hand 01/23/2022    Acute renal failure ([facility] 01/22/2022    [facility] oste[facility] foot [facility] 10/26/2020    Allergic rhinitis 07/26/2020    Osteoarthritis     Mixed hyperlipidemia 04/26/2016    Cardiomyopathy ([facility] 09/11/2014    Hypertension     Uncontrolled type 2 diabetes mellitus with diabetic polyneuropathy ([facility]        ASSESSMENT AND PLAN     #RODRICK- possibly pre-renal , in setting of poor po intake , hypotension  -improving now with iv fluids, now off, encouraged oral intake  -stopped ACEI, lasix  #H/O recent RODRICK requiring RRT through 3/1/22, creatinine normalized at the time     #Hyperkalemia  -stopped lisinopril  -improved      #MSSA bacteremia with left foot abscess, left hand abscess, osteomyelitis, RUL abscess  - finished oral doxycycline 4/28     #Multiple wounds   - wound vac to sacral wound and left foot wound  - wound care    #HTN  - off lisinopril  - on metoprolol, titrated better BP control     #DM2   - insulin       #Anemia  - recent GIB s/p embolization of gastroduodenal artery on 2/25 and ex-lap  - monitor and transfuse for Hb<7     # H/O Atrial Fibrillation  - on  lopressor  - AC contraindicated due to bleeding risk    #MS changes; not likely to be due to RODRICK  -decreased the Neurontin dose to 100 mg every evening

## 2022-04-30 NOTE — CONSULTS
[address] [address]                                  CONSULTATION    PATIENT NAME: Kenyatta Gillespie                   :        1975  MED REC NO:   [id number]                          ROOM:       9247  ACCOUNT NO:   [de-identified]                           ADMIT DATE: 2022  PROVIDER:     Regla Pinto MD    CONSULT DATE:  2022    HISTORY OF PRESENT ILLNESS:  The patient is a 31-year-old male who was  initially admitted to the hospital in 2022 and has had a very  complicated hospital course. He has a past medical history significant  for type 2 diabetes, diabetic foot ulcers with amputation of the right  and left great toes, a recent history of COVID, endotracheal intubation  for cardiac arrest who was admitted to an [place] [address] [facility] on 03/10/2022, where  they managed his bilateral lower extremity wounds including MSSA  bacteremia, left foot abscess, left hand abscess etc.  He required  multiple blood transfusions, underwent a cholecystectomy on 2022  and was admitted to the [facility] on 2022 because of functional deficits  below his baseline. Dr. Tiana Kauffman referred him for psychiatric  consultation because he has begun hallucinating, has become increasingly  agitated and that she wanted some recommendations on managing that. The patient was seen in his room along with his wife. The patient was  not believed to be a particularly good self historian and talked about  not liking the shoes that were on his feet even though he only had the  dressings on his feet, kept trying to lower the leg extension on his  chair unsuccessfully and was becoming increasingly agitated, stating  that he had an MRI he had to get done tonight, although that was not  accurate.   The patient was able to state some of his medical history in  terms of what he had been through, although he adamantly denied that he  ever had COVID stating \"I do not know why people keep telling me that is  not true. \"    PAST PSYCHIATRIC HISTORY:  He does not have any significant past history  of psychosis. He has never been admitted to a psychiatric unit. He has  never been on antipsychotic medication, prior to this series of  hospitalizations, although he had apparently been on Seroquel at some  point because it is currently on hold since his admission here because  of all of his medical problems. FAMILY PSYCHIATRIC HISTORY:  Does not include any successful suicides. SOCIAL HISTORY:  The patient lives at home, states he works both on  farms and in [employer] and manages to stay busy despite his diabetes and  his other medical limitations. There is no consistent history of  alcohol or drug dependency issues that are documented and his wife did  not describe anything like that and no current legal problems pending. REVIEW OF SYSTEMS:  A 10-system review was done, but again the patient  is not believed to be a very reliable self historian. PHYSICAL EXAMINATION:  VITAL SIGNS:  His temperature 98.2, his pulse 81, respirations 17, blood  pressure 130/88. GENERAL APPEARANCE:  The patient appears to be a young man who has  clearly lost a significant amount of weight during his very long  hospital stays and is mildly to moderately agitated sitting in his  chair. NEUROMUSCULAR EXAM:  The patient presented generally weak and his muscle  tone appeared to be normal.  His gait, the patient was not believed to  be safe to ambulate. MENTAL STATUS EXAM:  The patient appears as a man who is attempting to  be cooperative and there is no evidence of tardive dyskinesia. His  thought processes at times are disorganized and he has been reported to  be responding to hallucinations and in fact, the patient stated \"earlier  today I could have sworn I was at work and no one could tell me  different. \"  He has been somewhat acting out during these times that he  spent hallucinating. He does not admit to having any paranoid thoughts,  although again he becomes agitated when the people around him do not  seem to agree with his hallucinations. His mood is labile and he is  becoming easily agitated. His affect is consistent with his mood. He  does not present manic; however, nor does he present with a major  depressive episode. His speech is spontaneous and for the most part  goal directed if not pressured. His language is without aphasia. His  memory testing could not be completed given his psychosis and again he  simply unable to concentrate which is very impaired. His general fund  of knowledge is adequate. His insight and judgment are limited. DIAGNOSES:  AXIS I:  Delirium. AXIS II:  No diagnosis. AXIS III:  1. Myopathy. 2.  MSSA bacteremia. 3.  Multiple wounds POA. AXIS IV:  Severe. AXIS V:  Current GAF 30, highest in the past year 79 to 76. RECOMMENDATIONS:  1. The patient is increasingly delirious responding to hallucinations  and becoming agitated in the process putting himself at risk. He seems  increasingly impulsive and I did speak to the nurse and the staff as  well as his wife about how we were going to try to manage that. The  nurse had mentioned that he almost put him in restraints earlier because  of concern about his poor impulse control and I would certainly agree  with that assessment if the medications were starting do not have an  fairly immediate impact. Dr. Dallas Jalloh has already made efforts to try  to reduce his opioids and try to reduce the medication input in terms of  his delirium and Dr. Edil Flynn who saw the patient earlier did not  necessarily see evidence of any new infection and he continues on  treatment as outlined in her note. I am going to start Seroquel at 100  mg at bedtime and 25 mg twice a day.   I will also add a p.r.n. in case  his nurses need something in between and the staff can call me over the  weekend if what I have ordered does not seem to be helpful and even  though I am not physically going to be in the hospital this weekend and  I do not believe there is any psychiatric coverage, I have encouraged  him to let me know what is going on so I can manage until I see him  again on Monday. 2.  More than 70 minutes was spent on the consultation more than half of  which was in direct patient care and included care coordination and  treatment planning.         Cesar Hwang MD    D: 04/29/2022 22:42:10       T: 04/29/2022 22:47:02     [email or web address]  Job#: [id number]     Doc#: [id number]    CC:

## 2022-04-30 NOTE — PROGRESS NOTES
Occupational Therapy  Facility/Department: [facility]  Rehabilitation Occupational Therapy Daily Treatment Note    Date: 22  Patient Name: Zaid Anderson       Room: 6297/7036-82  MRN: [id number]  Account: [de-identified]   : 1975  (41 y.o.) Gender: male                    Past Medical History:  has a past medical history of Abscess of left foot, Abscess of left hand, Acute encephalopathy, Acute hypoxemic respiratory failure ([address] Acute osteomyelitis of left hand ([address] Acute osteomyelitis of right hallux ([address] Cardiopulmonary arrest ([address] Cellulitis of left foot, CHF (congestive heart failure) ([address] Chronic osteomyelitis of left foot ([address] Closed displaced fracture of distal phalanx of right little finger, Clostridium difficile infection, Diabetic ulcer of left forefoot associated with type 2 diabetes mellitus, with necrosis of bone ([address] Diabetic ulcer of right great toe associated with type 2 diabetes mellitus, with necrosis of bone ([address] Enterococcal infection, 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 Past Surgical History:   has a past surgical history that includes Tonsillectomy; [place] tooth extraction; Knee arthroscopy (Left, 08/15/2013); Toe amputation (Right, 2019); other surgical history (Left, 2020); Toe amputation (Left, 2020); Toe amputation (Left, 10/22/2020); Foot Debridement (Left, 2022); Arm Surgery (Left, 2022);  IR NONTUNNELED VASCULAR CATHETER > 5 YEARS (02/11/2022); Upper gastrointestinal endoscopy (N/A, 02/17/2022); IR EMBOLIZATION HEMORRHAGE (Right, 02/25/2022); Upper gastrointestinal endoscopy (N/A, 2/27/2022); laparotomy (N/A, 2/27/2022); Foot Debridement (Left, 3/8/2022); Cholecystectomy; Dilatation, esophagus; and Endoscopy, colon, diagnostic. Restrictions       Subjective  Subjective: Pt seated EOB with PT upon OT arrival. PT reports elevated BP                Objective  Vitals: 161/92 supine. 160/90 EOB. Pt was then placed back to supine and brianna lifted to recliner. Pt's /87 while seated in recliner with BLEs down. Pt c/o back pain and reclined with BLE elevated, /89        ADL    Pt placed on bed pan, without success. Pt requires assistance in perianal hygiene and donning new brief. Assessment  Assessment  Assessment: Upon OT arrival pt seated EOB with PT. PT reports elevated BP. Informed pt that RN was in quite a while ago with medications. Informed RN who reports that BP meds were provided along with pain meds, which should have lowered BP. BP was assessed at EOB, 157/102. Upon return to pt's room, PT assisting pt to return to supine position. Pt requires MAX A x 2 in order to scoot to [facility]. Positioning of pt performed by therapists. Pt reports seeing dark spots and not feeling well when sitting EOB. Blood sugar was checked, pt at 146. Both therapist agreed to hold pt at this time, and will return to see pt later this afternoon. Cotx with PT, BP assessed, continues to be elevated. Pt requires assistance with bed pan in order to have BM. Pt assisted in rolling side to side, pt unsuccessful with BM. Pt requires MAX A for angelo/doff shorts and brief. PT and OT provided pt with exercises, BP appeared to decrease. Pt continues to demo hallucinations. Pt brianna lifted to recliner, BP continued to decrease and stabilized at 150/85. Pt provided with BUE AROM exercises.      Activity Tolerance: Patient limited by fatigue;Patient limited by endurance; Patient limited by pain  Discharge Recommendations: Continue to assess pending progress; Patient would benefit from continued therapy after discharge  OT Equipment Recommendations  ADL Assistive Devices: Grab Bars - shower;Transfer Tub Bench       Patient Education   OT role     Plan  Plan  Times per Week: 5-7x per week  Times per Day: Daily  Current Treatment Recommendations: Strengthening;ROM;Balance training;Functional mobility training; Endurance training;Pain management; Safety education & training;Patient/Caregiver education & training;Positioning;Self-Care / ADL;Neuromuscular re-education; Wheelchair mobility training;Equipment evaluation, education, & procurement    Goals    Short Term Goals  Time Frame for Short term goals: Pt will complete UB dressing with min A- GOAL MET; Pt SBA for UB dressing at EOB  Short Term Goal 1: Pt will complete LB dressing with mod A  Short Term Goal 2: Pt will complete LB bathing with mod A-GOAL MET 4/26  Short Term Goal 3: Pt will complete UB bathing with SBA-GOAL MET 4/26  Short Term Goal 4: Pt will complete 3 grooming task at w/c level-GOAL MET 4/26  Short Term Goal 5: Pt will complete x20 reps of BUE HEP to improve UB strength/endurance for repositioning and UB ADLs- GOAL MET 4/20;  Pt completing x20 BUE AROM  Additional Goals?: No  Long Term Goals  Time Frame for Long term goals : 3 weeks (5/05)  Long Term Goal 1: Pt will complete total body dressing with supv  Long Term Goal 2: Pt will complete total body bathing with supv  Long Term Goal 3: Pt will complete 3 grooming tasks at sink with mod I  Long Term Goal 4: Pt will perform functional transfers with mod I  Patient Goals   Patient goals : 1 week (4/21)-EXTEND TO 4/27    Therapy Time   Individual Concurrent Group Co-treatment   Time In       1030   Time Out       1050   Minutes       20   Timed Code Treatment Minutes: 20 Minutes     Second Session Therapy Time:   Individual Concurrent Group Co-treatment   Time In 1315     1230   Time Out 1340     1315   Minutes 25     45     Timed Code Treatment Minutes:  70 Minutes    Total Treatment Minutes:  90 minutes     Jane Constantino, OT

## 2022-04-30 NOTE — PROGRESS NOTES
Physical Therapy  Facility/Department: [facility]  Rehabilitation Physical Therapy Treatment Note    NAME: Lucia Moe  : 1975 (42 y.o.)  MRN: [id number]  CODE STATUS: Full Code    Date of Service: 22       Restrictions:  Restrictions/Precautions: General Precautions; Fall Risk  Position Activity Restriction  Other position/activity restrictions: RUE PICC, sacral wound vac     SUBJECTIVE  Subjective  Subjective: Pt resting in bed on approach, continues to be confused and nauseous and reports increased pain. BP continues to be elevated  Pain: Reports 9/10 buttock and back pain            OBJECTIVE  Orientation  Overall Orientation Status: Within Functional Limits (pt is oriented x 4 but lethargic and confused)    Functional Mobility  Roll Left  Assistance Level: Minimal assistance  Skilled Clinical Factors: With BR, multiple bouts  Roll Right  Assistance Level: Minimal assistance  Skilled Clinical Factors: with BR, multiple bouts  Supine to Sit  Assistance Level: Moderate assistance  Skilled Clinical Factors: HOB elevated, use of BR, increased time to complete, modA at trunk  Scooting  Assistance Level: Dependent; Requires x 2 assistance  Skilled Clinical Factors: maxA x 2 to scoot to [facility]  Balance  Sitting Balance: Contact guard assistance (Pt sitting EOB x 8 minutes grossly CGA d/t lethargy and pt falling asleep (limited d/t safety, elevated BP and fatigue))    *T/f training deferred d/t safety with high BP, cognition and pain. ASSESSMENT/PROGRESS TOWARDS GOALS       Assessment  Assessment: Pt seen in am as 30 minute individual tx followed by co-tx with OT secondary to complexity of medical condition, decreased activity tolerance and high MARIE with mobility. Pt benefits from x 2 skilled hands to provide increased cues and feedback with mobility to improve safety and I. Pt continues to be limited during session d/t elevated BP with BP ranges 157//100.  RN notified and reports pt recieved medication for BP this am and messaged MD regarding BP. Trialed sitting EOB to see if BP improved with upright positioning however maintains high with diastolic maintained >533. Pt reports \"seeing black spots\" and feeling as though he is going to pass out while sitting EOB so pt assisted back to supine for safety and positioned in bed. Session ended at this time for safety and will re-attempt to complete remaining session in pm pending improved BP, RN aware. Pt will benefit from continued skilled PT in ARU to address above deficits, will continue to progress mobility as tolerated. Activity Tolerance: Patient limited by fatigue;Patient limited by endurance; Patient limited by pain  Discharge Recommendations: Subacute/Skilled Nursing Facility  PT Equipment Recommendations  Equipment Needed: Yes  Mobility Devices: Wheelchair;Hospital Bed  Hospital Bed : Electric - Full (Head of Bed); Bed Rails - Quadrant;Trapeze  Other: If pt d/c home he will require hospital bed, manual w/c with elevating leg rests and swing away armrests, likely slide board    Addendum: 2nd session:  Pt seen in pm with OT as co-tx for second session. Pt's BP continues to be elevated but slightly improved from am, /97 and 153/98 at rest and seated EOB. Pt with increased reports of hallucinations, reports \"aluminum foil\" and \"nuts and bolts\" in his hand that he is trying to throw away. Pt continues to report buttock/hip pain and nausea. BP improved to 136/81 with brianna-lift to chair. OOB to chair completed to decrease c/o pain with change in position, promote upright posture and for pressure relief, brianna-lift used for safety d/t cognition, fatigue and elevated BP.   Bed mobility:  B rolling with BR, Luisa, cues for hand placement and increased time to complete  Supine to/from sit modA with HOB elevated, use of BR, increased time to complete cues for sequence  Transfers:  Brianna lift used for t/f EOB to recliner with TD (used lift d/t elevated BP, confusion and weakness)   Balance:  Pt sitting EOB x 6 minutes with grossly CGA to SBA for balance with cues to improve upright posture and positioning. No overt LOB noted. Completed to trial sitting EOB and assess BP (see assessment) and promote improve postural control and stability for progression of mobility. Therapeutic Exercise:  Supine BLE   GS x 15   Heel slide x 15  Cues for sequence/technique, increased time to complete    Pt seated in recliner at end of session with OT present and all needs within reach. Goals  Patient Goals   Patient goals : \"to walk again\"  Short Term Goals  Short term goal 1: Pt will complete bed mobility with min A; goal partially met 4/22 - pt completes supine>sit with min A and up to mod A x2 for sit>supine  Short term goal 2: Pt will complete functional transfers with max A x 1 using LRAD; not met 4/22 - pt requires up to max A x2 in // bars and herb stedy  Short term goal 3: Pt will propel manual w/c 50ft with min A; goal met 4/21 - pt propels manual w/c 75 ft with min A  Short term goal 4: Pt will tolerate gait assessment and appropriate LTG to be set; goal not met 4/22 - pt attempts to step but unable to complete successfully to attempt walking  Long Term Goals  Time Frame for Long term goals : 3 weeks- 5/4/22  Long term goal 1: Pt will complete bed mobility with supervision  Long term goal 2: Pt will complete functional transfers with min A using LRAD  Long term goal 3: Pt will propel manual w/c 150ft with mod I    PLAN OF CARE/SAFETY  Plan  Plan:  minutes of therapy at least 5 out of 7 days a week  Plan weeks: 3 weeks  Current Treatment Recommendations: Strengthening;Balance training;Functional mobility training;Transfer training; Endurance training;Gait training;Neuromuscular re-education;Home exercise program;Safety education & training;Patient/Caregiver education & training; Wheelchair mobility training;Equipment evaluation, education, & procurement;Positioning;Manual Therapy - Soft Tissue Mobilization; Therapeutic activities  Safety Devices  Type of Devices: All annemarie prominences offloaded; All fall risk precautions in place; Bed alarm in place;Call light within reach; Heels elevated for pressure relief;Patient at risk for falls; Left in bed;Nurse notified      Therapy Time   Individual Concurrent Group Co-treatment   Time In 1000     1030   Time Out 1030     1050   Minutes 30     20     Timed Code Treatment Minutes: 48 Minutes       Second Session Therapy Time:   Individual Concurrent Group Co-treatment   Time In      1230   Time Out      1315   Minutes      45     Timed Code Treatment Minutes:  45 minutes    Total Treatment Minutes:  95 minutes    Marina Romero PT, DPT 04/30/22 at 3:21 PM

## 2022-04-30 NOTE — PROGRESS NOTES
Hospitalist Progress Note      PCP: Paola Valerio MD    Date of Admission: 4/13/2022    Chief Complaint: Encephalopathy    Hospital Course: 42 y.o. male with a PMH of Diabetes mellitus type 2 poorly controlled, CHF, history of diabetic ulcer with amputation of the right great toe, Obesity, Neuropathy, CM and history of tobacco abuse who was admitted to [facility] on 1/22/22 with Sepsis/RODRICK/MSSA bacteremia-from left foot infection, Resp failure which progressed to cardiac arrest. He was d/c'd to a LTACH on 3/10/2022. Admitted to ARU on 4/13/2022. Hospitalist consulted due mental status change. Patient reportedly easily agitated and hallucinating.      Subjective: somnolent      Medications:  Reviewed    Infusion Medications    dextrose       Scheduled Medications    [START ON 5/1/2022] metoprolol succinate  25 mg Oral Daily    magnesium oxide  400 mg Oral BID    QUEtiapine  100 mg Oral Nightly    QUEtiapine  25 mg Oral BID    [Held by provider] gabapentin  100 mg Oral Nightly    [Held by provider] traZODone  100 mg Oral Nightly    mineral oil-hydrophilic petrolatum   Topical Daily    insulin lispro  0-18 Units SubCUTAneous TID WC    insulin lispro  0-9 Units SubCUTAneous Nightly    insulin lispro  4 Units SubCUTAneous TID WC    insulin glargine  25 Units SubCUTAneous Nightly    metoclopramide  10 mg Oral 4x Daily AC & HS    fluticasone  2 spray Each Nostril Nightly    heparin (porcine)  5,000 Units SubCUTAneous 3 times per day    pantoprazole  40 mg Oral BID    PARoxetine  20 mg Oral Daily    multivitamin  1 tablet Oral Daily    tamsulosin  0.4 mg Oral Nightly    [Held by provider] tiZANidine  8 mg Oral BID     PRN Meds: senna, haloperidol, QUEtiapine, oxyCODONE, oxyCODONE, prochlorperazine, ondansetron, ondansetron, hydrALAZINE, acetaminophen, diclofenac sodium, glucose, glucagon (rDNA), dextrose, bisacodyl, aluminum & magnesium hydroxide-simethicone, polyethylene glycol, dextrose bolus (hypoglycemia) **OR** dextrose bolus (hypoglycemia)      Intake/Output Summary (Last 24 hours) at 4/30/2022 1218  Last data filed at 4/30/2022 0257  Gross per 24 hour   Intake 120 ml   Output 2025 ml   Net -1905 ml       Physical Exam Performed:    BP (!) 175/99   Pulse 99   Temp 97 °F (36.1 °C) (Oral)   Resp 20   Ht 6' 0.5\" (1.842 m)   Wt 211 lb 11.2 oz (96 kg)   SpO2 95%   BMI 28.32 kg/m²     General appearance: Obese. No apparent distress, appears stated age and cooperative during assessment  HEENT:  Normal cephalic, atraumatic without obvious deformity. Pupils equal, round, and reactive to light. Extra ocular muscles intact. Conjunctivae/corneas clear. Neck: Supple, with full range of motion. No jugular venous distention. Trachea midline. Respiratory:  Normal respiratory effort. Clear to auscultation, bilaterally without Rales/Wheezes/Rhonchi. Cardiovascular:  Regular rate and rhythm with normal S1/S2 without murmurs, rubs or gallops. Abdomen: Soft, non-tender, non-distended with normal bowel sounds. Musculoskeletal:  No clubbing, cyanosis or edema bilaterally. Full range of motion without deformity. Skin: Skin color-multiple areas of healing wounds on LE, sacral ulcer  Neurologic:  No focal deficits, MICHELE  Psychiatric:  Somnolent, poor insight, follows commands  Capillary Refill: Brisk,3 seconds, normal  Peripheral Pulses: +2 palpable, equal bilaterally       Labs:   Recent Labs     04/27/22  1304 04/28/22  0711 04/29/22  0820   WBC 10.7 7.8 6.8   HGB 8.6* 9.2* 9.2*   HCT 25.3* 26.3* 26.4*    310 278     Recent Labs     04/28/22  0711 04/29/22  0820 04/30/22  0729    138 141   K 5.2* 4.3 4.3    104 105   CO2 25 24 24   BUN 31* 22* 19   CREATININE 1.6* 1.5* 1.5*   CALCIUM 10.7* 10.3 11.1*   PHOS  --  4.2  --      Recent Labs     04/29/22  0820   AST 11*   ALT 7*   BILIDIR <0.2   BILITOT 0.3   ALKPHOS 110     No results for input(s): INR in the last 72 hours.   Recent Labs 04/29/22  0820   CKTOTAL 23*       Urinalysis:      Lab Results   Component Value Date    NITRU Negative 04/27/2022    WBCUA 21-50 04/27/2022    BACTERIA Rare 01/22/2022    RBCUA 3-4 04/27/2022    BLOODU TRACE-INTACT 04/27/2022    SPECGRAV 1.010 04/27/2022    GLUCOSEU Negative 04/27/2022       Radiology:  CT HEAD WO CONTRAST   Final Result   No acute intracranial abnormality. XR CHEST PORTABLE   Final Result   Low lung volumes, with subtle bilateral perihilar opacities concerning for   multifocal pneumonia. US RENAL COMPLETE   Final Result   Unremarkable ultrasound of the kidneys and urinary bladder. Assessment/Plan:    Active Hospital Problems    Diagnosis     Moderate malnutrition ([facility] [E44.0]     Critical illness myopathy [G72.81]      Delirium, multifactorial prolonged hospitalization, medications, infection  -WBC nl, LA/ammonia nl, afebrile  -Finished Doxy 4/27, Zosyn 2 doses 4/27-4/28  -ABG stable, CK 23  -Hold neurontin, zanaflex, desyrel  -Psych consult  -Added Seroquel 100 nightly and BID  -Vit B12/Folate/TSH pending  -4/27 BC x2 no growth to date  -4/27 Ngtd urine cx, indwelling rea  -s/p lap cholecystectomy   -CT head no acute abnormality  -L/D: midline, rea     DM  -FSBS AC/HS  -Lantus, SSI     RODRICK, improving  -nephrology following     Multiple wounds: sacral stage IV-wound vac, left foot wound     HTN-lisinopril held     PAF-now in SR, no AC d/t bleeding risk     PT/OT      ACP: full code    DVT Prophylaxis: heparin  Diet: ADULT DIET; Regular; 4 carb choices (60 gm/meal)  ADULT ORAL NUTRITION SUPPLEMENT; Breakfast, Dinner, Lunch;  Low Calorie/High Protein Oral Supplement  Code Status: Full Code    PT/OT Eval Status: ARU    Dispo - ~several days    EVANS Alva - CNP

## 2022-04-30 NOTE — PROGRESS NOTES
Physical Therapy  Attempted to see pt this am for PT tx, on arrival pts /97 and 164/100, RN notified. Will hold PT tx this am and re-attempt later this date pending improved BP.    Thanks,  Josefina Culp PT, DPT

## 2022-05-01 NOTE — PROGRESS NOTES
Nephrology Progress  Note   [email or web address]       CC:RODRICK  HPI  The patient is a 42 y.o. male  with PMH of CHF,recent [facility] 2022 [facility] s/p cardiac arrest;required RRT until March 2022 , transferred to [id number] [place] [address], now in [place]. Creatinine had normalized in March-April but started to trend up at the end of last week                                     SUBJECTIVE  Was on lisinopril and lasix, now stopped. BP was on the low side at the time  On iv fluids now off, creatinine slowly improving with SCr in mid 1 range. Bp's running higher now. Denies any shortness of breath. ROS:  Intake reduced, +weak.       Scheduled Meds:   metoprolol succinate  25 mg Oral Daily    amLODIPine        magnesium oxide  400 mg Oral BID    QUEtiapine  100 mg Oral Nightly    QUEtiapine  25 mg Oral BID    [Held by provider] gabapentin  100 mg Oral Nightly    [Held by provider] traZODone  100 mg Oral Nightly    mineral oil-hydrophilic petrolatum   Topical Daily    insulin lispro  0-18 Units SubCUTAneous TID WC    insulin lispro  0-9 Units SubCUTAneous Nightly    insulin lispro  4 Units SubCUTAneous TID WC    insulin glargine  25 Units SubCUTAneous Nightly    metoclopramide  10 mg Oral 4x Daily AC & HS    fluticasone  2 spray Each Nostril Nightly    heparin (porcine)  5,000 Units SubCUTAneous 3 times per day    pantoprazole  40 mg Oral BID    PARoxetine  20 mg Oral Daily    multivitamin  1 tablet Oral Daily    tamsulosin  0.4 mg Oral Nightly    [Held by provider] tiZANidine  8 mg Oral BID     Continuous Infusions:   dextrose       PRN Meds:senna, haloperidol, QUEtiapine, oxyCODONE, oxyCODONE, prochlorperazine, ondansetron, ondansetron, hydrALAZINE, acetaminophen, diclofenac sodium, glucose, glucagon (rDNA), dextrose, bisacodyl, aluminum & magnesium hydroxide-simethicone, polyethylene glycol, dextrose bolus (hypoglycemia) **OR** dextrose bolus (hypoglycemia)      Objective:      Physical Exam  Wt Readings from Last 3 Encounters:   04/26/22 211 lb 11.2 oz (96 kg)   03/10/22 255 lb 1.6 oz (115.7 kg)   10/14/21 282 lb (127.9 kg)     Temp Readings from Last 3 Encounters:   04/30/22 97.1 °F (36.2 °C) (Oral)   03/10/22 98.5 °F (36.9 °C) (Oral)   03/11/21 98.1 °F (36.7 °C)     BP Readings from Last 3 Encounters:   04/30/22 (!) 170/92   03/10/22 (!) 175/88   03/08/22 (!) 98/56     Pulse Readings from Last 3 Encounters:   04/30/22 90   03/10/22 106   10/14/21 70   Gen: NAD  HEENT: Normocephalic, atraumatic  CV: Regular rate and rhythm , no rub  Resp: No respiratory distress.  CTAB  Abd: Soft, nontender, nondistended  Ext: No CCE  Skin: [place] to sacrum and L foot   Neuro: non-focal    Lab Review   Lab Results   Component Value Date    WBC 6.8 04/29/2022    HGB 9.2 (L) 04/29/2022    HCT 26.4 (L) 04/29/2022    MCV 93.7 04/29/2022     04/29/2022     Lab Results   Component Value Date     04/30/2022    K 4.3 04/30/2022     04/30/2022    CO2 24 04/30/2022    BUN 19 04/30/2022    CREATININE 1.5 04/30/2022    GLUCOSE 148 04/30/2022    CALCIUM 11.1 04/30/2022        Patient Active Problem List    Diagnosis Date Noted    Moderate malnutrition ([address] 04/14/2022    Critical illness myopathy 04/13/2022    MSSA bacteremia     ABLA (acute blood loss anemia)     Gastrointestinal hemorrhage     Probable abscess of upper lobe of right lung without pneumonia ([address]     Diabetic ulcer of left midfoot associated with type 2 diabetes mellitus, with necrosis of bone ([facility] 02/21/2022    Duodenal ulcer 02/17/2022    Paroxysmal atrial fibrillation (HCC)     Severe protein-calorie malnutrition ([facility] [facility] 02/08/2022    Pressure injury of deep tissue of sacral region 02/07/2022  Antibiotic-associated diarrhea 02/01/2022    Staph aureus infection 01/23/2022    Third degree burn of left hand 01/23/2022    Acute renal failure ([facility] 01/22/2022    Acute osteomyelitis of left foot ([facility] 10/26/2020    Allergic rhinitis 07/26/2020    Osteoarthritis     Mixed hyperlipidemia 04/26/2016    Cardiomyopathy ([facility] 09/11/2014    Hypertension     Uncontrolled type 2 diabetes mellitus with diabetic polyneuropathy ([facility]        ASSESSMENT AND PLAN     #RODRICK- possibly pre-renal , in setting of poor po intake , hypotension  -improving now with iv fluids, now off, encouraged oral intake  -stopped ACEI, lasix  #H/O recent RODRICK requiring RRT through 3/1/22, creatinine normalized at the time  #Start back  ml/hour for 2 liters now that appearing on the dry side again     #Hyperkalemia  -stopped lisinopril  -improved      #MSSA bacteremia with left foot abscess, left hand abscess, osteomyelitis, RUL abscess  - finished oral doxycycline 4/28     #Multiple wounds   - wound vac to sacral wound and left foot wound  - wound care    #HTN  - off lisinopril  - on metoprolol, titrated for better BP control     #DM2   - insulin       #Anemia  - recent GIB s/p embolization of gastroduodenal artery on 2/25 and ex-lap  - monitor and transfuse for Hb<7     # H/O Atrial Fibrillation  - on  lopressor  - AC contraindicated due to bleeding risk    #MS changes; not likely to be due to RODRICK  -decreased the Neurontin dose to 100 mg every evening

## 2022-05-01 NOTE — PROGRESS NOTES
Hospitalist Progress Note      PCP: Mabel Felty, MD    Date of Admission: 4/13/2022    Chief Complaint: Encephalopathy    Hospital Course: 42 y.o. male with a PMH of Diabetes mellitus type 2 poorly controlled, CHF, history of diabetic ulcer with amputation of the right great toe, Obesity, Neuropathy, CM and history of tobacco abuse who was admitted to [facility] on 1/22/22 with Sepsis/RODRICK/MSSA bacteremia-from left foot infection, Resp failure which progressed to cardiac arrest. He was d/c'd to a LTACH on 3/10/2022. Admitted to ARU on 4/13/2022. Hospitalist consulted due mental status change. Patient reportedly easily agitated and hallucinating. Subjective: Patient resting in nad, updated spouse at bedside. She reports they spoke this am and patient seemed more lucid.        Medications:  Reviewed    Infusion Medications    dextrose       Scheduled Medications    metoprolol succinate  25 mg Oral Daily    magnesium oxide  400 mg Oral BID    QUEtiapine  100 mg Oral Nightly    QUEtiapine  25 mg Oral BID    [Held by provider] gabapentin  100 mg Oral Nightly    [Held by provider] traZODone  100 mg Oral Nightly    mineral oil-hydrophilic petrolatum   Topical Daily    insulin lispro  0-18 Units SubCUTAneous TID WC    insulin lispro  0-9 Units SubCUTAneous Nightly    insulin lispro  4 Units SubCUTAneous TID WC    insulin glargine  25 Units SubCUTAneous Nightly    metoclopramide  10 mg Oral 4x Daily AC & HS    fluticasone  2 spray Each Nostril Nightly    heparin (porcine)  5,000 Units SubCUTAneous 3 times per day    pantoprazole  40 mg Oral BID    PARoxetine  20 mg Oral Daily    multivitamin  1 tablet Oral Daily    tamsulosin  0.4 mg Oral Nightly    [Held by provider] tiZANidine  8 mg Oral BID     PRN Meds: senna, haloperidol, QUEtiapine, oxyCODONE, oxyCODONE, prochlorperazine, ondansetron, ondansetron, hydrALAZINE, acetaminophen, diclofenac sodium, glucose, glucagon (rDNA), dextrose, bisacodyl, aluminum & magnesium hydroxide-simethicone, polyethylene glycol, dextrose bolus (hypoglycemia) **OR** dextrose bolus (hypoglycemia)      Intake/Output Summary (Last 24 hours) at 5/1/2022 1241  Last data filed at 5/1/2022 1009  Gross per 24 hour   Intake 0 ml   Output 1225 ml   Net -1225 ml       Physical Exam Performed:    BP (!) 161/101   Pulse 106   Temp 96.9 °F (36.1 °C) (Oral)   Resp 16   Ht 6' 0.5\" (1.842 m)   Wt 211 lb 11.2 oz (96 kg)   SpO2 97%   BMI 28.32 kg/m²     General appearance: Obese. No apparent distress, appears stated age and cooperative during assessment  HEENT:  Normal cephalic, atraumatic without obvious deformity. Pupils equal, round, and reactive to light. Extra ocular muscles intact. Conjunctivae/corneas clear. Neck: Supple, with full range of motion. No jugular venous distention. Trachea midline. Respiratory:  Normal respiratory effort. Clear to auscultation, bilaterally without Rales/Wheezes/Rhonchi. Cardiovascular:  Regular rate and rhythm with normal S1/S2 without murmurs, rubs or gallops. Abdomen: Soft, non-tender, non-distended with normal bowel sounds. Musculoskeletal:  No clubbing, cyanosis or edema bilaterally. Full range of motion without deformity. Skin: Skin color-multiple areas of healing wounds on LE, sacral ulcer  Neurologic:  No focal deficits, MICHELE  Psychiatric:  Somnolent, limited insight, follows commands  Capillary Refill: Brisk,3 seconds, normal  Peripheral Pulses: +2 palpable, equal bilaterally       Labs:   Recent Labs     04/29/22  0820   WBC 6.8   HGB 9.2*   HCT 26.4*        Recent Labs     04/29/22  0820 04/29/22  0820 04/30/22  0729 05/01/22  1018     --  141 139   K 4.3   < > 4.3 4.5  4.5     --  105 102   CO2 24  --  24 21   BUN 22*  --  19 17   CREATININE 1.5*  --  1.5* 1.4*   CALCIUM 10.3  --  11.1* 11.1*   PHOS 4.2  --   --  5.5*    < > = values in this interval not displayed.      Recent Labs     04/29/22  0820   AST 11*   ALT 7*   BILIDIR <0.2   BILITOT 0.3   ALKPHOS 110     No results for input(s): INR in the last 72 hours. Recent Labs     04/29/22  0820   CKTOTAL 23*       Urinalysis:      Lab Results   Component Value Date    NITRU Negative 04/27/2022    WBCUA 21-50 04/27/2022    BACTERIA Rare 01/22/2022    RBCUA 3-4 04/27/2022    BLOODU TRACE-INTACT 04/27/2022    SPECGRAV 1.010 04/27/2022    GLUCOSEU Negative 04/27/2022       Radiology:  CT HEAD WO CONTRAST   Final Result   No acute intracranial abnormality. XR CHEST PORTABLE   Final Result   Low lung volumes, with subtle bilateral perihilar opacities concerning for   multifocal pneumonia. US RENAL COMPLETE   Final Result   Unremarkable ultrasound of the kidneys and urinary bladder. Assessment/Plan:    Active Hospital Problems    Diagnosis     Moderate malnutrition ([facility] [address] [E44.0]     Critical illness myopathy [G72.81]      Delirium, multifactorial prolonged hospitalization, medications, infection  -WBC nl, LA/ammonia nl, afebrile  -Finished Doxy 4/27, Zosyn 2 doses 4/27-4/28  -ABG stable, CK 23  -Hold neurontin, zanaflex, desyrel  -Psych consult  -Added Seroquel 100 nightly and BID  -Vit B12 566/Folate 18.6/TSH wnl  -4/27 BC x2 no growth to date  -4/27 Ngtd urine cx, indwelling rea  -s/p lap cholecystectomy   -CT head no acute abnormality  -L/D: midline, era     DM  -FSBS AC/HS  -Lantus, SSI     RODRICK, improving  -nephrology following     Multiple wounds: sacral stage IV-wound vac, left foot wound     HTN-lisinopril held, BB     PAF-now in SR, no AC d/t bleeding risk     PT/OT      ACP: full code    DVT Prophylaxis: heparin  Diet: ADULT DIET; Regular; 4 carb choices (60 gm/meal)  ADULT ORAL NUTRITION SUPPLEMENT; Breakfast, Dinner, Lunch;  Low Calorie/High Protein Oral Supplement  Code Status: Full Code    PT/OT Eval Status: ARU    Dispo - ~several days    EVANS Kennedy - CNP

## 2022-05-02 NOTE — PROGRESS NOTES
Occupational Therapy  [facility]  Rehabilitation Occupational Therapy Daily Treatment Note    Date: 22  Patient Name: Ann-Marie Solis       Room: 5721/3115-91  MRN: [id number]  Account: [de-identified]   : 1975  (41 y.o.) Gender: male                    Past Medical History:  has a past medical history of Abscess of left foot, Abscess of left hand, Acute encephalopathy, Acute hypoxemic respiratory failure ([address] Acute osteomyelitis of left hand ([address] Acute osteomyelitis of right hallux ([address] Cardiopulmonary arrest ([address] Cellulitis of left foot, CHF (congestive heart failure) ([address] Chronic osteomyelitis of left foot ([address] Closed displaced fracture of distal phalanx of right little finger, Clostridium difficile infection, Diabetic ulcer of left forefoot associated with type 2 diabetes mellitus, with necrosis of bone ([address] Diabetic ulcer of right great toe associated with type 2 diabetes mellitus, with necrosis of bone ([address] Enterococcal infection, 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 Past Surgical History:   has a past surgical history that includes Tonsillectomy; [place] tooth extraction; Knee arthroscopy (Left, 08/15/2013); Toe amputation (Right, 2019); other surgical history (Left, 2020); Toe amputation (Left, 2020); Toe amputation (Left, 10/22/2020); Foot Debridement (Left, 2022); Arm Surgery (Left, 2022);  IR NONTUNNELED VASCULAR CATHETER > 5 YEARS (02/11/2022); Upper gastrointestinal endoscopy (N/A, 02/17/2022); IR EMBOLIZATION HEMORRHAGE (Right, 02/25/2022); Upper gastrointestinal endoscopy (N/A, 2/27/2022); laparotomy (N/A, 2/27/2022); Foot Debridement (Left, 3/8/2022); Cholecystectomy; Dilatation, esophagus; and Endoscopy, colon, diagnostic. Restrictions       Subjective  Subjective: Pt in supine, agreeable to OT/PT cotx. Pt reports increased nausea, less hallucinations this date noted. 6/10 in buttocks, repositioned in bed and unable to tolerate sitting in recliner despite multiple re-positioning attempts, and RN made aware. Objective     Cognition  Overall Cognitive Status: Exceptions  Arousal/Alertness: Delayed responses to stimuli  Following Commands: Follows one step commands with repetition; Follows multistep commands with repitition  Attention Span: Attends with cues to redirect  Memory: Decreased recall of precautions;Decreased short term memory  Safety Judgement: Decreased awareness of need for assistance;Decreased awareness of need for safety  Problem Solving: Assistance required to correct errors made;Assistance required to implement solutions;Assistance required to generate solutions;Assistance required to identify errors made  Insights: Decreased awareness of deficits  Initiation: Requires cues for some  Sequencing: Requires cues for some  Orientation  Overall Orientation Status: Within Functional Limits         ADL  Feeding  Assistance Level: Increased time to complete;Set-up; Stand by assist;Supervision  Skilled Clinical Factors: sitting in recliner, cues for scanning and intiation  Grooming/Oral Hygiene  Assistance Level: Increased time to complete;Supervision;Set-up  Skilled Clinical Factors: sitting in recliner, cues for scanning and intiation  Upper Extremity Bathing  Assistance Level: Increased time to complete;Contact guard assist;Minimal assistance  Skilled Clinical Factors: sitting in recliner, cues for scanning and intiation  Upper Extremity Dressing  Assistance Level: Increased time to complete;Minimal assistance;Verbal cues  Skilled Clinical Factors: sitting EOB, cues for scanning and intiation  Lower Extremity Dressing  Assistance Level: Dependent; Requires x 2 assistance  Skilled Clinical Factors: depA for donning/doffing sock and shoes  Putting On/Taking Off Footwear  Assistance Level: Dependent  Skilled Clinical Factors: depA for donning/doffing sock and shoes  Toileting  Assistance Level: Dependent (álvaro)     Functional Mobility  Device:  (sit pivot EOB to drop arm recliner, then maxi-move back to bed due to individual session)  Activity:  (EOB to recliner to supin)  Skilled Clinical Factors: maxA x2 sit pivot EOB to recliner, maxi move back to bed  Bed Mobility  Overall Assistance Level: Minimal Assistance  Additional Factors: With handrails; Head of bed raised; Increased time to complete;Verbal cues  Bridging  Assistance Level: Minimal assistance  Roll Left  Assistance Level: Minimal assistance  Skilled Clinical Factors: to manage pants  Roll Right  Assistance Level: Minimal assistance  Skilled Clinical Factors: to manage pants  Supine to Sit  Assistance Level: Minimal assistance  Skilled Clinical Factors: HOB elevated use of bed rail  Scooting  Assistance Level: Minimal assistance; Requires x 2 assistance  Skilled Clinical Factors: cues for weight shifting L vs R  Transfers  Surface: To bed;From bed; To chair with arms;From chair with arms  Additional Factors: Increased time to complete; Mat raised; With handrails;Verbal cues; Hand placement cues  Device: Lift equipment  Sit to Stand  Assistance Level: Maximum assistance; Requires x 2 assistance  Skilled Clinical Factors: maxA x2 sit/lateral pivot EOB to recliner towards L side; maxi-move back to bed  Bed To/From Chair  Technique: Sit pivot  Assistance Level: Requires x 2 assistance;Maximum assistance  Skilled Clinical Factors: maxA x2 sit/lateral pivot EOB to recliner towards L side; maxi-move back to bed  Sit Pivot  Assistance Level: Dependent;Maximum assistance; Requires x 2 assistance  Skilled Clinical Factors: maxA x2 sit/lateral pivot EOB to recliner towards L side; maxi-move back to bed   OT Exercises  A/AROM Exercises: x20 of the following exercises: AROM shoulder flexion, elbow flexion/ext, wrist flexion/ext, finger flexion/ext following along HEP     Assessment  Assessment  Assessment: Pt seen for OT/PT cotx to progress towards goals and assess functional transfer training/tolerance. Pt continues to demo decreased endurance, strength, tolerance to simple ADLs and unsupported sitting. Pt was able to complete bed mobility Luisa x1-2, encouragement to sit in recliner for increased endurance, repositioning/weight shifting, and strength for transfers. Pt was able to complete a sit pivot from EOB to recliner this date but did need maxA x2 for all transfer trials and multiple scoots. BP was in the 170s/100s throughout, HR in the 100s at rest, RN is aware. Individual session: Once in recliner, pt was able to complete UE bathing,d ressing, grooming tasks with setup/SPV. Pt then completed simple AROM exercises wiht increased time. Pt then requests to lay back down due to increased pain in buttocks. PCA assisted OT with brianna lift recliner to bed, modA x2 to scoot to [facility], left in bed with all needs. Pt is very limited by BP, nausea, strength, and tolerance this date. Spoke wiht RN, CM,and MD on patients deficits and OT's medical concerns. MD reports hospitalist has been consulted. CTA pending progress DC plan however pt is not safe to return home at this time, SNF is recommended. Cont OT POC. Second session: Pt was in bed, sleeping with wife present but awoken easily and agreeable to OT/PT to get to recliner to sit up. 's/60s this date with HR in the 80s. Pt was able to complete supine to sit EOB Luisa x1, scooting to EOB Luisa x1.   Pt needing modA x2 lateral transfers scooting on EOB to recliner with mod cues for weight shifting and line management. Pt was able to complete sit <> stand x1 trial in steady modA x2 ~20 seconds with PT while OT adjusted lines and michel pad in chair. Encouraged patient to sit up for at least 1 hour. Cont OT POC. Activity Tolerance: Patient limited by fatigue;Patient limited by endurance; Patient limited by pain  Discharge Recommendations: Continue to assess pending progress; Patient would benefit from continued therapy after discharge;Subacute/Skilled Nursing Facility  OT Equipment Recommendations  Equipment Needed: Yes  Mobility Devices: ADL Assistive Devices  ADL Assistive Devices: Grab Bars - shower;Transfer Tub Bench  Other: CTA pending progress  Safety Devices  Type of devices: Nurse notified;Call light within reach; Left in bed;Bed alarm in place; Patient at risk for falls; All fall risk precautions in place;Gait belt    Patient Education  Education  Education Given To: Patient; Family  Education Provided: ADL Function;Role of Therapy;Plan of Care;Equipment;Home Exercise Program;Precautions; Safety; Energy Conservation; Family Education  Education Method: Demonstration;Verbal;Teach Back  Barriers to Learning: None  Education Outcome: Verbalized understanding;Demonstrated understanding;Continued education needed    Plan  Plan  Times per Week: 5-7x per week  Times per Day: Daily  Current Treatment Recommendations: Strengthening;ROM;Balance training;Functional mobility training; Endurance training;Pain management; Safety education & training;Patient/Caregiver education & training;Positioning;Self-Care / ADL;Neuromuscular re-education; Wheelchair mobility training;Equipment evaluation, education, & procurement    Goals  Patient Goals   Patient goals : 1 week (4/21)-EXTEND TO 4/27  Short Term Goals  Time Frame for Short term goals: Pt will complete UB dressing with min A- GOAL MET; Pt SBA for UB dressing at EOB  Short Term Goal 1: Pt will complete LB dressing with mod A  Short Term Goal 2: Pt will complete LB bathing with mod A-GOAL MET 4/26  Short Term Goal 3: Pt will complete UB bathing with SBA-GOAL MET 4/26  Short Term Goal 4: Pt will complete 3 grooming task at w/c level-GOAL MET 4/26  Short Term Goal 5: Pt will complete x20 reps of BUE HEP to improve UB strength/endurance for repositioning and UB ADLs- GOAL MET 4/20;  Pt completing x20 BUE AROM  Additional Goals?: No  Long Term Goals  Time Frame for Long term goals : 3 weeks (5/05)  Long Term Goal 1: Pt will complete total body dressing with supv  Long Term Goal 2: Pt will complete total body bathing with supv  Long Term Goal 3: Pt will complete 3 grooming tasks at sink with mod I  Long Term Goal 4: Pt will perform functional transfers with mod I    Therapy Time   Individual Concurrent Group Co-treatment   Time In 0945     0900   Time Out 1015     0945   Minutes 30     45   Timed Code Treatment Minutes: 75 Minutes     Second Session Therapy Time:   Individual Concurrent Group Co-treatment   Time In      1430   Time Out      1500   Minutes      30     Timed Code Treatment Minutes:  30 Minutes    Total Treatment Minutes:  [address], OTR/L

## 2022-05-02 NOTE — PROGRESS NOTES
Manasa Mario  5/2/2022  [id number]    Chief Complaint: Critical illness myopathy    Subjective:   No acute events overnight. Over the weekend the patient continued to have hallucinations. Medicine, Nephrology, and Psychiatry have been following. Today he is seen in his room, resting in bed. His mentation appears significantly improved. He does not appear confused and is not hallucinating. He does report significant nausea. This has been an ongoing problem for him, but is worse today. Discussed case with medicine and planning to obtain XR abdomen and lipase. If not improvement by tomorrow, they may consider further imaging. ROS: denies f/c, cp, sob    Objective:  Patient Vitals for the past 24 hrs:   BP Temp Temp src Pulse Resp SpO2   05/02/22 1024 (!) 174/101   104  (!) 87 %   05/02/22 1014     18    05/02/22 0745 (!) 164/97 98.1 °F (36.7 °C) Oral 101 16 98 %   05/01/22 2111 126/73 98.3 °F (36.8 °C) Axillary 89 16 97 %   05/01/22 1350 (!) 151/98          Gen: Alert, NAD, supine in bed  HEENT: Normocephalic, atraumatic  CV: extremities well perfused  Resp: No respiratory distress. Abd: Soft, nontender, nondistended  Ext: No edema  Skin: [place] to sacrum and L foot with appropriate seal   Neuro: Alert, oriented to person, place, year, and situation.    Psych: appropriate mood and affect    Wt Readings from Last 3 Encounters:   04/26/22 211 lb 11.2 oz (96 kg)   03/10/22 255 lb 1.6 oz (115.7 kg)   10/14/21 282 lb (127.9 kg)       Laboratory data:   Lab Results   Component Value Date    WBC 6.8 05/02/2022    HGB 10.1 (L) 05/02/2022    HCT 28.5 (L) 05/02/2022    MCV 92.7 05/02/2022     05/02/2022       Lab Results   Component Value Date     05/02/2022    K 3.8 05/02/2022     05/02/2022    CO2 23 05/02/2022    BUN 17 05/02/2022    CREATININE 1.3 05/02/2022    GLUCOSE 186 05/02/2022    CALCIUM 10.5 05/02/2022        Therapy progress:  PT  Position Activity Restriction  Other position/activity restrictions: RUE PICC, sacral wound vac  Objective     Sit to Stand: Maximum Assistance,Dependent/Total,2 Person Assistance  Stand to sit: Maximum Assistance,Dependent/Total,2 Person Assistance  Bed to Chair: Dependent/Total     OT  PT Equipment Recommendations  Equipment Needed: No  Mobility Devices: [facility] Bed : Electric - Full (Head of Bed),Bed Magy Boland  Other: defer to SNF     Assessment        SLP  Current Diet : Regular  Current Liquid Diet : Thin  Diet Solids Recommendation: Regular  Liquid Consistency Recommendation: Thin    Body mass index is 28.32 kg/m². Assessment and Plan:  Angel Fraga is a 42year old male with a past medical history significant for DM2, diabetic foot ulcer with multiple amputations, HFrEF who has had a prolonged hospital course following Covid pneumonia with complications including respiratory failure, GIB, and multiple infections. He was admitted to [facility] on 4/13/22 due to functional deficits below his baseline.      Critical Illness Myopathy  - due to covid pneumonia  - PT, OT, ST     MSSA bactermia  - with left foot abscess, left hand abscess, osteomyelitis, RUL abscess  - transitioned to oral doxycycline, continued through 4/28     Multiple wounds POA  - wound vac to sacral wound and left foot wound  - wound care    Encephalopathy, improved  - etiology unclear. Workup not revealing of infectious, metabolic or intracranial etiology. - wbc, ammonia, electrolytes, lactic acid, and ABG within normal limits  - CT head negative for acute process  - ID following, low suspicion for infection  - possibly polypharmacy? Although patient had been stable on pain medications when hallucinations and encephalopathy developed.  Have wean down and discontinued scheduled dilaudid.  - gabapentin, tizanidine, and trazodone held  - seroquel per Psych  - mentation appears improved today    Nausea and vomiting  - chronic problem, but worsening symptoms today  - Medicine obtaining XR abd and lipase  - continue PRN nausea medications  - obtain EKG to monitor Qtc      HFrEF  - EF 35%  - discontinued lasix due to RODRICK, patient remains euvolemic on exam  - daily weights     RODRICK on CKD  - RODRICK due to sepsis, cardiac arrest. Required dialysis during acute stay  - Cr trending down.  Previously been in 1-1.2 range  - stopped lasix  - fluids per Nephrology, appreciate assistance    Hyperkalemia, resolved  - suspect due to kidney dysfunction  - s/p lokemia 4/26  - Nephro following    Paroxysmal Atrial Fibrillation  - BB  - AC contraindicated due to bleeding risk    HTN  - lopressor 12.5 mg BID  - discontinued lisinopril     DM2 complicated by neuropathy  - home regimen lantus 75, prandial 15, SSI  - lantus 25, prandial 4U, SSI  - glucose remains above goal, holding off on adjusting regimen due to worsening nausea and vomiting     Diabetic Neuropathy  - gabapentin discontinued     Acute blood loss anemia  - recent GIB s/p embolization of gastroduodenal artery on 2/25 and ex-lap  - monitor and transfuse for Hb<7     History of GIB  - PPI      Urinary Retention  - flomax  - ISC with high volumes, rea replaced  - will need follow up with Urology     Chronic Pain  - discontinued dilaudid due to AMS  - oxycodone PRN     Bowel: Per protocol  Bladder: Per protocol  Sleep: Trazodone PRN  DVT PPx: heparin    Services/DME: home PT, OT, nursing  EDOD: 5/3     Follow up appointments: PCP, Cardiology, wound care    Electronically signed by Julius Magaña MD on 5/2/2022 at 1:27 PM

## 2022-05-02 NOTE — PROGRESS NOTES
Nephrology Progress Note   [facility]. [facility]      This patient is a 42year old male whom we are following for RODRICK. Subjective: The patient was seen and examined. Awake, able to answer questions. IVF running. BP high. Family History: No family at bedside  ROS: No fever or chills      Vitals:  BP (!) 174/101   Pulse 104   Temp 98.1 °F (36.7 °C) (Oral)   Resp 18   Ht 6' 0.5\" (1.842 m)   Wt 211 lb 11.2 oz (96 kg)   SpO2 (!) 87%   BMI 28.32 kg/m²   I/O last 3 completed shifts: In: 360 [P.O.:360]  Out: 2709 [Urine:2575]  I/O this shift:  In: 600 [P.O.:600]  Out: 375 [Urine:375]    Physical Exam:  Physical Exam  Vitals reviewed. Constitutional:       General: He is not in acute distress. HENT:      Head: Normocephalic and atraumatic. Eyes:      General: No scleral icterus. Conjunctiva/sclera: Conjunctivae normal.   Cardiovascular:      Rate and Rhythm: Normal rate. Heart sounds: No friction rub. Pulmonary:      Effort: Pulmonary effort is normal. No respiratory distress. Breath sounds: No wheezing. Abdominal:      Tenderness: There is no abdominal tenderness. Musculoskeletal:      Right lower leg: No edema. Left lower leg: No edema. Neurological:      Mental Status: He is alert.            Medications:   metoprolol succinate  25 mg Oral Daily    magnesium oxide  400 mg Oral BID    QUEtiapine  100 mg Oral Nightly    QUEtiapine  25 mg Oral BID    [Held by provider] gabapentin  100 mg Oral Nightly    [Held by provider] traZODone  100 mg Oral Nightly    mineral oil-hydrophilic petrolatum   Topical Daily    insulin lispro  0-18 Units SubCUTAneous TID WC    insulin lispro  0-9 Units SubCUTAneous Nightly    insulin lispro  4 Units SubCUTAneous TID WC    insulin glargine  25 Units SubCUTAneous Nightly    metoclopramide  10 mg Oral 4x Daily AC & HS    fluticasone  2 spray Each Nostril Nightly    heparin (porcine)  5,000 Units SubCUTAneous 3 times per day    pantoprazole 40 mg Oral BID    PARoxetine  20 mg Oral Daily    multivitamin  1 tablet Oral Daily    tamsulosin  0.4 mg Oral Nightly    [Held by provider] tiZANidine  8 mg Oral BID         Labs:  Recent Labs     05/02/22  0909   WBC 6.8   HGB 10.1*   HCT 28.5*   MCV 92.7        Recent Labs     04/30/22  0729 04/30/22  0729 05/01/22  1018 05/02/22  0909     --  139 139   K 4.3   < > 4.5  4.5 3.8     --  102 102   CO2 24  --  21 23   GLUCOSE 148*  --  167* 186*   PHOS  --   --  5.5*  --    BUN 19  --  17 17   CREATININE 1.5*  --  1.4* 1.3   LABGLOM 51*  --  55* 60*   GFRAA >60  --  >60 >60    < > = values in this interval not displayed. Assessment/Plan:    Acute Kidney Injury. - possibly pre-renal, in setting of poor po intake, hypotension.  - H/O recent RODRICK requiring RRT through 3/1/22.  - now improving with iv fluids, continue NS at 75cc/hr. - continue holding ACEi, lasix    Hyperkalemia  -stopped lisinopril  -improved      MSSA bacteremia with left foot abscess, left hand abscess, osteomyelitis, RUL abscess  - finished oral doxycycline 4/28     Multiple wounds   - wound vac to sacral wound and left foot wound  - wound care     HTN  - off lisinopril  - on metoprolol, titrated for better BP control     DM2   - insulin       Anemia  - recent GIB s/p embolization of gastroduodenal artery on 2/25 and ex-lap  - monitor and transfuse for Hb<7     H/O Atrial Fibrillation  - on  lopressor  - AC contraindicated due to bleeding risk     MS changes; not likely to be due to RODRICK  - Neurontin, Tizanidine and Trazodone on hold. Please do not hesitate to contact me at [phone] if with questions. Thank you! Gino Seth MD  [facility]  5/2/2022

## 2022-05-02 NOTE — PLAN OF CARE
Problem: Pain:  Goal: Pain level will decrease  Description: Pain level will decrease  5/2/2022 1504 by King Mayfiled RN  Outcome: Progressing     Problem: Pain:  Goal: Control of acute pain  Description: Control of acute pain  5/2/2022 1504 by King Mayfield RN  Outcome: Progressing       Problem: Falls - Risk of:  Goal: Absence of physical injury  Description: Absence of physical injury  5/2/2022 1504 by King Mayfield RN  Outcome: Progressing  5/2/2022 1504 by King Mayfield RN  Outcome: Progressing     Problem: Nutrition  Goal: Optimal nutrition therapy  5/2/2022 1504 by King Mayfield RN  Outcome: Progressing  5/2/2022 1504 by King Mayfield RN  Outcome: Progressing        Problem: Skin Integrity:  Goal: Absence of new skin breakdown  Description: Absence of new skin breakdown  5/2/2022 1504 by King Mayfield RN  Outcome: Progressing

## 2022-05-02 NOTE — PROGRESS NOTES
Hospitalist Progress Note      PCP: Kai Limon MD    Date of Admission: 4/13/2022    Chief Complaint: Encephalopathy    Hospital Course: 42 y.o. male with a PMH of Diabetes mellitus type 2 poorly controlled, CHF, history of diabetic ulcer with amputation of the right great toe, Obesity, Neuropathy, CM and history of tobacco abuse who was admitted to [facility] on 1/22/22 with Sepsis/RODRICK/MSSA bacteremia-from left foot infection, Resp failure which progressed to cardiac arrest. He was d/c'd to a LTACH on 3/10/2022. Admitted to ARU on 4/13/2022. Hospitalist consulted due mental status change. Patient reportedly easily agitated and hallucinating. Subjective:     Mentation better. Pt AAOx3. No hallucinations since yesterday per RN. Pt reports nausea with multiple episodes of non bloody emesis. Minimal abd pain. Had BM yesterday.   Discussed with wife at bedside          Medications:  Reviewed    Infusion Medications    sodium chloride 100 mL/hr at 05/01/22 2302    dextrose       Scheduled Medications    [START ON 5/3/2022] metoprolol succinate  50 mg Oral Daily    magnesium oxide  400 mg Oral BID    QUEtiapine  100 mg Oral Nightly    QUEtiapine  25 mg Oral BID    [Held by provider] gabapentin  100 mg Oral Nightly    [Held by provider] traZODone  100 mg Oral Nightly    mineral oil-hydrophilic petrolatum   Topical Daily    insulin lispro  0-18 Units SubCUTAneous TID WC    insulin lispro  0-9 Units SubCUTAneous Nightly    insulin lispro  4 Units SubCUTAneous TID WC    insulin glargine  25 Units SubCUTAneous Nightly    metoclopramide  10 mg Oral 4x Daily AC & HS    fluticasone  2 spray Each Nostril Nightly    heparin (porcine)  5,000 Units SubCUTAneous 3 times per day    pantoprazole  40 mg Oral BID    PARoxetine  20 mg Oral Daily    multivitamin  1 tablet Oral Daily    tamsulosin  0.4 mg Oral Nightly    [Held by provider] tiZANidine  8 mg Oral BID     PRN Meds: promethazine, senna, haloperidol, QUEtiapine, oxyCODONE, oxyCODONE, prochlorperazine, ondansetron, ondansetron, hydrALAZINE, acetaminophen, diclofenac sodium, glucose, glucagon (rDNA), dextrose, bisacodyl, aluminum & magnesium hydroxide-simethicone, polyethylene glycol, dextrose bolus (hypoglycemia) **OR** dextrose bolus (hypoglycemia)      Intake/Output Summary (Last 24 hours) at 5/2/2022 1203  Last data filed at 5/2/2022 1111  Gross per 24 hour   Intake 960 ml   Output 1725 ml   Net -765 ml       Physical Exam Performed:    BP (!) 174/101   Pulse 104   Temp 98.1 °F (36.7 °C) (Oral)   Resp 18   Ht 6' 0.5\" (1.842 m)   Wt 211 lb 11.2 oz (96 kg)   SpO2 (!) 87%   BMI 28.32 kg/m²     General appearance: Obese. No apparent distress, appears stated age and cooperative  HEENT:  Normal cephalic, atraumatic without obvious deformity. Conjunctivae/corneas clear. Neck: Supple, with full range of motion. No jugular venous distention. Trachea midline. Respiratory:  Normal respiratory effort. Clear to auscultation, bilaterally without Rales/Wheezes/Rhonchi. Cardiovascular:  Regular rate and rhythm with normal S1/S2 without murmurs, rubs or gallops. Abdomen: Soft, non-tender, non-distended with normal bowel sounds. Musculoskeletal:  No clubbing, cyanosis or edema bilaterally. Skin: Skin color-multiple areas of healing wounds on LE, sacral ulcer  Neurologic:  No focal deficits,  Psychiatric:  Somnolent but easily arousable. Answering questions appropriately today. Labs:   Recent Labs     05/02/22  0909   WBC 6.8   HGB 10.1*   HCT 28.5*        Recent Labs     04/30/22  0729 04/30/22  0729 05/01/22  1018 05/02/22  0909     --  139 139   K 4.3   < > 4.5  4.5 3.8     --  102 102   CO2 24  --  21 23   BUN 19  --  17 17   CREATININE 1.5*  --  1.4* 1.3   CALCIUM 11.1*  --  11.1* 10.5   PHOS  --   --  5.5*  --     < > = values in this interval not displayed.      No results for input(s): AST, ALT, BILIDIR, BILITOT, ALKPHOS in the last 72 hours. No results for input(s): INR in the last 72 hours. No results for input(s): Melina Breeze in the last 72 hours. Urinalysis:      Lab Results   Component Value Date    NITRU Negative 04/27/2022    WBCUA 21-50 04/27/2022    BACTERIA Rare 01/22/2022    RBCUA 3-4 04/27/2022    BLOODU TRACE-INTACT 04/27/2022    SPECGRAV 1.010 04/27/2022    GLUCOSEU Negative 04/27/2022       Radiology:  CT HEAD WO CONTRAST   Final Result   No acute intracranial abnormality. XR CHEST PORTABLE   Final Result   Low lung volumes, with subtle bilateral perihilar opacities concerning for   multifocal pneumonia. US RENAL COMPLETE   Final Result   Unremarkable ultrasound of the kidneys and urinary bladder. Assessment/Plan:    Active Hospital Problems    Diagnosis     Moderate malnutrition ([facility] [address] [E44.0]     Critical illness myopathy [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 No overt signs of infection- infection ruled out per ID. CT head non acute. Held neurontin, zanaflex, desyrel. Psych recs appreciated. Improving with seroquel- continue med. RODRICK, improving. Nephrology following     Multiple wounds: continue wound care      HTN-lisinopril held due to RODRICK. Not controlled. Mgt differed to nephro. Continue BB     PAF-now in SR, no AC d/t bleeding risk     MSSA bacteremia with left foot abscess, left hand abscess, osteomyelitis, RUL abscess  - finished oral doxycycline 4/28    Anemia:  recent GIB s/p embolization of gastroduodenal artery on 2/25 and ex-lap. No overt signs of GIB currently. hgb stable. Monitor and transfuse for Hb<7    N/V/abd pain : No overt sign of GIB at this time. Check AXR, lipase to eval. Continue antiemetics PRN. DM:  Continue insulin       Discussed with Dr Carmen Austin, RN     DVT Prophylaxis: heparin  Diet: ADULT DIET;  Regular; 4 carb choices (60 gm/meal)  ADULT ORAL NUTRITION SUPPLEMENT; Breakfast, Dinner, [employer];  Low Calorie/High Protein Oral Supplement  Code Status: Full Code    PT/OT Eval Status: ARU    Dispo -  Per primary team     Yaw Page MD

## 2022-05-02 NOTE — PROGRESS NOTES
Department of Psychiatry  Consultant Progress Note  Chief Complaint: follow-up delirium. He has not been as labile or irritable and thoughts are clearing per his wife and review of chart. Patient had difficulty this morning with nausea and was asleep. No side effects per his wife and we discussed plan of care. Patient's chart was reviewed and collaborated with  about the treatment plan. .  SUBJECTIVE:    Patient is feeling better. Suicidal ideation: denies suicidal ideation  Patient denies    ROS: Patient has new complaints: no  Sleeping adequately:  Yes  Appetite adequate: yes  Attending groups: N/A  Visitors:yes - wife    OBJECTIVE    Physical  VITALS:  BP (!) 174/101   Pulse 104   Temp 98.1 °F (36.7 °C) (Oral)   Resp 18   Ht 6' 0.5\" (1.842 m)   Wt 211 lb 11.2 oz (96 kg)   SpO2 (!) 87%   BMI 28.32 kg/m²   Patients appearance hospital attire and lying in bed. Musculoskeletal  Patient gait asleep and not tested   STRENGTH: generalized weakness was noted TONE normal    Mental Status Examination:   No current loose associations  Concentrationwas better than friday   Thoughts are improving  Insight and Judgement couldn't be tested  Knowledge: adequate by history  Language: 0 - no aphasia, normal  Speech: appropriate   Mood less labile, affect congruent with mood  Orientation: oriented to person, place, time/date and situation   Hallucinations Absent   Thought Processes: Goal-Directed  Memory intact  suicidal ideations absent with no plan  Homicidal ideations no           Data  Labs:   No results displayed because visit has over 200 results.                Medications  Current Facility-Administered Medications: promethazine (PHENERGAN) tablet 12.5 mg, 12.5 mg, Oral, Q6H PRN  [START ON 5/3/2022] metoprolol succinate (TOPROL XL) extended release tablet 50 mg, 50 mg, Oral, Daily  0.9 % sodium chloride infusion, , IntraVENous, Continuous  senna (SENOKOT) tablet 17.2 mg, 2 tablet, Oral, Nightly PRN  haloperidol (HALDOL) tablet 0.5 mg, 0.5 mg, Oral, Q6H PRN  magnesium oxide (MAG-OX) tablet 400 mg, 400 mg, Oral, BID  QUEtiapine (SEROQUEL) tablet 100 mg, 100 mg, Oral, Nightly  QUEtiapine (SEROQUEL) tablet 25 mg, 25 mg, Oral, BID  QUEtiapine (SEROQUEL) tablet 25 mg, 25 mg, Oral, Q4H PRN  [Held by provider] gabapentin (NEURONTIN) capsule 100 mg, 100 mg, Oral, Nightly  [Held by provider] traZODone (DESYREL) tablet 100 mg, 100 mg, Oral, Nightly  mineral oil-hydrophilic petrolatum (AQUAPHOR) ointment, , Topical, Daily  insulin lispro (HUMALOG) injection vial 0-18 Units, 0-18 Units, SubCUTAneous, TID WC  insulin lispro (HUMALOG) injection vial 0-9 Units, 0-9 Units, SubCUTAneous, Nightly  insulin lispro (HUMALOG) injection vial 4 Units, 4 Units, SubCUTAneous, TID WC  insulin glargine (LANTUS) injection vial 25 Units, 25 Units, SubCUTAneous, Nightly  oxyCODONE (ROXICODONE) immediate release tablet 5 mg, 5 mg, Oral, Q4H PRN  oxyCODONE (ROXICODONE) immediate release tablet 10 mg, 10 mg, Oral, Q4H PRN  prochlorperazine (COMPAZINE) tablet 5 mg, 5 mg, Oral, Q6H PRN  metoclopramide (REGLAN) tablet 10 mg, 10 mg, Oral, 4x Daily AC & HS  ondansetron (ZOFRAN-ODT) disintegrating tablet 4 mg, 4 mg, Oral, Q8H PRN  ondansetron (ZOFRAN) injection 4 mg, 4 mg, IntraVENous, Q6H PRN  hydrALAZINE (APRESOLINE) tablet 10 mg, 10 mg, Oral, Q2H PRN  acetaminophen (TYLENOL) tablet 650 mg, 650 mg, Oral, Q6H PRN  diclofenac sodium (VOLTAREN) 1 % gel 4 g, 4 g, Topical, 4x Daily PRN  fluticasone (FLONASE) 50 MCG/ACT nasal spray 2 spray, 2 spray, Each Nostril, Nightly  heparin (porcine) injection 5,000 Units, 5,000 Units, SubCUTAneous, 3 times per day  glucose (GLUTOSE) 40 % oral gel 15 g, 15 g, Oral, PRN  glucagon (rDNA) injection 1 mg, 1 mg, IntraMUSCular, PRN  dextrose 5 % solution, 100 mL/hr, IntraVENous, PRN  bisacodyl (DULCOLAX) suppository 10 mg, 10 mg, Rectal, Daily PRN  aluminum & magnesium hydroxide-simethicone (MAALOX) 200-200-20 MG/5ML suspension 30 mL, 30 mL, Oral, Q6H PRN  pantoprazole (PROTONIX) tablet 40 mg, 40 mg, Oral, BID  PARoxetine (PAXIL) tablet 20 mg, 20 mg, Oral, Daily  polyethylene glycol (GLYCOLAX) packet 17 g, 17 g, Oral, Daily PRN  therapeutic multivitamin-minerals 1 tablet, 1 tablet, Oral, Daily  tamsulosin (FLOMAX) capsule 0.4 mg, 0.4 mg, Oral, Nightly  [Held by provider] tiZANidine (ZANAFLEX) tablet 8 mg, 8 mg, Oral, BID  dextrose bolus (hypoglycemia) 10% 125 mL, 125 mL, IntraVENous, PRN **OR** dextrose bolus (hypoglycemia) 10% 250 mL, 250 mL, IntraVENous, PRN    ASSESSMENT AND PLAN    PRIMARY PSYCH DIAGNOSIS: delirium    Principal Problem:    Critical illness myopathy  Active Problems: Moderate malnutrition ([address]  Resolved Problems:    * No resolved hospital problems. *       1. Patient s symptoms better. I would leave him on the current dose of Seroquel for 2 weeks then if his thoughts have remained clear, cut the dose in 1/2 for 3 days and then discontinue if OK. 2.Probable discharge per the medical team   3. Discharge planning is complete  4 Suicidal ideation is denies suicidal ideation  5 total time 40 minutes more than 50% was spent in direct time with the patient    Allen Dwyer MD

## 2022-05-02 NOTE — PROGRESS NOTES
Comprehensive Nutrition Assessment    Type and Reason for Visit:  Reassess    Nutrition Recommendations/Plan:   1. Continue carb control diet as tolerated  2. Continue Ensure High Protein   3. Encourage po intakes  4. Monitor po intakes, nutrition adequacy, weights, pertinent labs, BMs     Malnutrition Assessment:  Malnutrition Status: Moderate malnutrition (04/14/22 1528)    Context:  Chronic Illness     Findings of the 6 clinical characteristics of malnutrition:  Energy Intake:  7 - 75% or less estimated energy requirements for 1 month or longer  Weight Loss:  7 - 7.5% over 3 months     Body Fat Loss:  No significant body fat loss     Muscle Mass Loss:  No significant muscle mass loss    Fluid Accumulation:  No significant fluid accumulation     Strength:  Not Performed    Nutrition Assessment:    Follow up: Pt remains nutritionally compromised AEB pt report of decreased appetite over the past two days d/t N/V. Pt currently on a carb control diet with po intakes 0% recently. Pt reports that he has not had much today, only liquids. Pt not consuming ONS, but states that he was drinking ONS when he wasn't feeling nauseous. Encouraged po intakes as tolerated. Will continue current diet and ONS and monitor. Nutrition Related Findings:    Trace LLE edema. +N/V per pt, antiemetics available. -208 mg/dL x 24 hr. BM x1 on 5/1. Wound Type: Stage IV,Pressure Injury,Surgical Incision,Burns,Wound Vac       Current Nutrition Intake & Therapies:    Average Meal Intake: 0% (Past few po intakes - pt reports that he has been drinking some fluids)  Average Supplements Intake: 0%  ADULT DIET; Regular; 4 carb choices (60 gm/meal)  ADULT ORAL NUTRITION SUPPLEMENT; Breakfast, Dinner, Lunch; Low Calorie/High Protein Oral Supplement    Anthropometric Measures:  Height: 6' 0.5\" (184.2 cm)  Ideal Body Weight (IBW): 181 lbs (82 kg)       Current Body Weight: 211 lb 11.2 oz (96 kg),   IBW.  Weight Source: Bed Scale  Current BMI (kg/m2): 28.3                          BMI Categories: Overweight (BMI 25.0-29. 9)    Estimated Daily Nutrient Needs:  Energy Requirements Based On: Kcal/kg  Weight Used for Energy Requirements: Ideal (80.9 kg)  Energy (kcal/day): 3278-8923  Weight Used for Protein Requirements: Ideal  Protein (g/day): 80-97  Method Used for Fluid Requirements: 1 ml/kcal  Fluid (ml/day):  2187-3818    Nutrition Diagnosis:   · Moderate malnutrition related to inadequate protein-energy intake,pain as evidenced by Criteria as identified in malnutrition assessment      Nutrition Interventions:   Food and/or Nutrient Delivery: Continue Current Diet,Continue Oral Nutrition Supplement  Nutrition Education/Counseling: Education declined  Coordination of Nutrition Care: Continue to monitor while inpatient,Interdisciplinary Rounds  Plan of Care discussed with: Patient    Goals:  Previous Goal Met: Progress towards Goal(s) Declining  Goals: Meet at least 75% of estimated needs,prior to discharge       Nutrition Monitoring and Evaluation:   Behavioral-Environmental Outcomes: None Identified  Food/Nutrient Intake Outcomes: Food and Nutrient Intake,Supplement Intake  Physical Signs/Symptoms Outcomes: Biochemical Data,Nutrition Focused Physical Findings,Weight,Skin,Diarrhea    Discharge Planning:    Continue current diet,Continue Oral Nutrition Supplement     Prakash Castellanos RD, LD  Contact: [phone]

## 2022-05-02 NOTE — PROGRESS NOTES
[facility] Nurse  Follow-up Progress Note       NAME:  Meryle Locks  MEDICAL RECORD NUMBER:  [id number]  AGE:  42 y.o. GENDER:  male  :  1975  TODAY'S DATE:  2022    Subjective: Spouse in room with patient. I'm good, I'll try to turn. Wound Identification:  Wound Type:traumatic left foot.  Healing stage 4 pressure injury coccyx/sacrum;  Blister left posterior hand deflated.      Patient alert & orient to surrounds and place. No longer has signs/symptoms of hallucinations when last seen on 22. Contributing Factors:  edema, diabetes, chronic pressure, decreased mobility, shear force, obesity, incontinence of stool and incontinence of urine        Patient Goal of Care:  [x] Wound Healing  [] Odor Control  [] Palliative Care  [x] Pain Control   [] Other:     Objective:  IV to right arm capped. Patient lean right side with pillow. Dressing to sacrum secure. Left foot dressing clean, dry, & secure. BP (!) 174/101   Pulse 104   Temp 98.1 °F (36.7 °C) (Oral)   Resp 18   Ht 6' 0.5\" (1.842 m)   Wt 211 lb 11.2 oz (96 kg)   SpO2 (!) 87%   BMI 28.32 kg/m²   Shimon Risk Score: Shimon Scale Score: 14  Assessment: Left foot granulation filling wound bed in, light red, no slough noted. Sacrum red with scant bloody seepage at right lateral edges. Granulation red/pink, filling wound base. No slough noted to sacrum wound. Left hand blister now deflated.      Measurements:  Negative Pressure Wound Therapy Coccyx (Active)   $ Standard NPWT <=50 sq cm PER TX $ Yes 22 1704   $ Standard NPWT >50 sq cm PER TX $ Yes 22 1605   Wound Type Pressure ulcer: Stage IV;Surgical 22 1335   Unit Type [employer] [id number] 22 1335   Dressing Type Black Foam 22 1335   Number of pieces used 1 22 1335   Number of pieces removed 1 22 1335   Cycle Continuous 22 1335   Target Pressure (mmHg) 125 22 1335 Intensity 1 05/02/22 1335   Canister changed? No 05/02/22 1335   Dressing Status Clean, dry & intact 05/02/22 1335   Dressing Changed Changed/New 05/02/22 1335   Drainage Amount Small 05/02/22 1335   Drainage Description Serosanguinous 05/02/22 1335   Dressing Change Due 05/04/22 05/02/22 1335   Output (ml) 50 ml 04/22/22 1704   Wound Assessment Epithelialization;Granulation tissue; Red 05/02/22 1335   Shape heart shape with extending  moist areas to posterior 05/02/22 1335   Odor None 05/02/22 1335   Number of days: 89         Wound 01/24/22 Foot Left;Dorsal I & D to left dorsal foot by Dr. Daphnie Vasquez, surgical wound (Active)   Number of days: 98       Wound 01/30/22 Sacrum SDTI (Active)   Wound Image   04/29/22 0933   Wound Etiology Pressure Stage 4 05/02/22 1335   Dressing Status Clean;Dry; Intact 05/02/22 1335   Wound Cleansed Cleansed with saline;Irrigated with saline 05/02/22 1335   Dressing/Treatment Negative pressure wound therapy 05/02/22 1335   Offloading for Diabetic Foot Ulcers Offloading ordered; Other (comment) 05/02/22 1335   Dressing Change Due 05/04/22 05/02/22 1335   Wound Length (cm) 8 cm 05/02/22 1335   Wound Width (cm) 5 cm 05/02/22 1335   Wound Depth (cm) 1.4 cm 04/29/22 0933   Wound Surface Area (cm^2) 40 cm^2 05/02/22 1335   Change in Wound Size % (l*w) -100 05/02/22 1335   Wound Volume (cm^3) 63 cm^3 04/29/22 0933   Wound Healing % -363 04/29/22 0933   Distance Tunneling (cm) 0 cm 05/02/22 1335   Tunneling Position ___ O'Clock 0 05/02/22 1335   Undermining Starts ___ O'Clock 0 05/02/22 1335   Undermining Ends___ O'Clock 0 05/02/22 1335   Undermining Maxium Distance (cm) 0 05/02/22 1335   Wound Assessment Pink/red;Granulation tissue 05/02/22 1335   Drainage Amount Scant 05/02/22 1335   Drainage Description Serosanguinous 05/02/22 1335   Odor None 05/02/22 1335   Shanita-wound Assessment Dry/flaky; Intact 05/02/22 1335   Margins Attached edges; Defined edges 05/02/22 1335   Wound Thickness Description not for Pressure Injury Full thickness 05/02/22 1335   Number of days: 92     Sacrum         Wound 04/29/22 Hand Left;Posterior (Active)   Wound Image   04/29/22 0933   Wound Etiology Other 05/02/22 1335   Dressing Status Other (Comment) 05/02/22 1335   Wound Cleansed Not Cleansed 05/02/22 1335   Dressing/Treatment Barrier film 05/02/22 1335   Offloading for Diabetic Foot Ulcers Offloading ordered 05/02/22 1335   Dressing Change Due 04/30/22 04/29/22 0933   Wound Length (cm) 3 cm 04/29/22 0933   Wound Width (cm) 3 cm 04/29/22 0933   Wound Surface Area (cm^2) 9 cm^2 04/29/22 0933   Distance Tunneling (cm) 0 cm 04/29/22 0933   Tunneling Position ___ O'Clock 0 04/29/22 0933   Undermining Starts ___ O'Clock 0 04/29/22 0933   Undermining Ends___ O'Clock 0 04/29/22 0933   Undermining Maxium Distance (cm) 0 04/29/22 0933   Wound Assessment Other (Comment) 05/02/22 1335   Drainage Amount None 05/02/22 1335   Shanita-wound Assessment Blanchable erythema 05/02/22 1335   Margins Attached edges 05/02/22 1335   Wound Thickness Description not for Pressure Injury Partial thickness 05/02/22 1335   Number of days: 3     Hand left        Incision 03/08/22 Foot Anterior; Left (Active)   Wound Image   04/29/22 0933   Dressing Status New dressing applied 05/02/22 1335   Dressing Change Due 05/03/22 05/02/22 1335   Incision Cleansed Cleansed with saline 05/02/22 1335   Dressing/Treatment Barrier film; Moist to moist 05/02/22 1335   Incision Length (cm) 1 04/29/22 0933   Incision Width (cm) 5 cm 04/29/22 0933   Incision Depth (cm) 0.1 cm 04/29/22 0933   Margins Other (Comment) 04/27/22 1605   Incision Assessment Epithelialization 05/02/22 1335   Drainage Amount Scant 05/02/22 1335   Drainage Description Sanguinous 05/02/22 1335   Odor None 05/02/22 1335   Shanita-incision Assessment Dry/flaky; Intact 05/02/22 1335   Number of days: 55     Foot left        Response to treatment:  Well tolerated by patient.      Pain Assessment:  Severity:  0 / ADULT DIET; Regular; 4 carb choices (60 gm/meal)  ADULT ORAL NUTRITION SUPPLEMENT; Breakfast, Dinner, Lunch;  Low Calorie/High Protein Oral Supplement  Dietician consult:  Yes    Discharge Plan:  Placement for patient upon discharge: skilled nursing   Patient appropriate for Outpatient [address]: Yes    Referrals:  [x]    following  [] [facility]  [] Supplies  [] Other    Patient/Caregiver Teaching:  Level of patient/caregiver understanding able to:   [] Indicates understanding       [] Needs reinforcement  [] Unsuccessful      [] Verbal Understanding  [] Demonstrated understanding       [] No evidence of learning  [] Refused teaching         [x] N/A       Electronically signed by Marya Harris RN, on 5/2/2022 at 2:19 PM

## 2022-05-02 NOTE — PROGRESS NOTES
Physical Therapy  Facility/Department: [facility]  Rehabilitation Physical Therapy Treatment Note    NAME: Merlin Mayans  : 1975 (42 y.o.)  MRN: [id number]  CODE STATUS: Full Code    Date of Service: 22       Restrictions:  Restrictions/Precautions: General Precautions; Fall Risk  Position Activity Restriction  Other position/activity restrictions: RUE PICC, sacral wound vac     SUBJECTIVE  Subjective  Subjective: pt in bed, reporting nausea and fatigue but agreeable to sitting EOB and eventually transferring to chair with therapy  Pain: pt does not formally rate pain           Vitals: 97%, 102 bpm, 174/94 in bed; 173/102 initially at EOB; 168/85 following transfer to chair. Pt reporting feeling like he is \"spinning\". OBJECTIVE  Cognition  Overall Cognitive Status: Exceptions  Arousal/Alertness: Delayed responses to stimuli  Following Commands: Follows one step commands with repetition; Follows multistep commands with repitition  Attention Span: Attends with cues to redirect  Memory: Decreased recall of precautions;Decreased short term memory  Safety Judgement: Decreased awareness of need for assistance;Decreased awareness of need for safety  Problem Solving: Assistance required to correct errors made;Assistance required to implement solutions;Assistance required to generate solutions;Assistance required to identify errors made  Insights: Decreased awareness of deficits  Initiation: Requires cues for some  Sequencing: Requires cues for some  Orientation  Overall Orientation Status: Within Functional Limits    Functional Mobility  Supine to Sit  Assistance Level: Moderate assistance (HOB elevated)  Skilled Clinical Factors: HOB elevated, use of BR, increased time to complete, modA at trunk  Bed To/From Chair  Technique: Sit pivot  Assistance Level: Maximum assistance;Dependent; Requires x 2 assistance (max A x2 toward L)    Pt sat EOB x15 mins with supervision      ASSESSMENT/PROGRESS TOWARDS GOALS Assessment  Assessment: Patient seen as co-treat with OT to facilitate safe transfers training given pt complexity, recent confusion, and low activity tolerance. Patient cleared by RN for therapy participation this date. Patient agreeable to therapy with encouragement. Patient completed supine>sit with mod A, sat EOB 15 mins with supervision, and completed sit pivot transfer toward L to recliner with max A x2 and cues for sequencing and safety. Pt tolerates minimal activity this session d/t nausea, throwing up this morning, and fatigue. Activity Tolerance: Patient limited by fatigue;Patient limited by endurance; Patient limited by pain  Discharge Recommendations: Subacute/Skilled Nursing Facility  PT Equipment Recommendations  Equipment Needed: No  Other: defer to SNF    Education: Pt educated on importance of OOB mobility, transfer technique, and vitals - demos understanding    Addendum Second Session  Assessment: Pt seen for individual session. Pt reports feeling a little better this PM and is agreeable to complete exercises in bed. Pt requesting to stay in bed this session. Vitals: 175/100    Exercises:  -1x 15 BLE quad sets  -1x 10 BLE heel slides  -1x 10 supine abduction    Addendum Third Session  Assessment: Pt seen as co-treat with OT to facilitate safe transfers training into recliner. Pt lethargic but able to participate with improvement in nausea and BP. Young lift pad left under pt for nursing staff. Vitals: /71, 76 bpm at rest in bed; 122/77 after transfer to chair    Bed mobility: supine>sit with mod A and HOB elevated, increased time, assist at trunk. Pt completes partial bridge to lift hips to don briefs. Transfers: Pt completes sit pivot transfer toward R with mod A x2 and increased time with cues for hand and foot placement. Pt completed partial  herb stedy with max A x2 briefly to adjust michel pad under pt and reposition.            Goals  Patient Goals   Patient goals : \"to walk again\"  Short Term Goals  Time Frame for Short term goals: 10 days- 4/22/22  Short term goal 1: Pt will complete bed mobility with min A; goal partially met 4/22 - pt completes supine>sit with min A and up to mod A x2 for sit>supine  Short term goal 2: Pt will complete functional transfers with max A x 1 using LRAD; not met 4/22 - pt requires up to max A x2 in // bars and ehrb stedy  Short term goal 3: Pt will propel manual w/c 50ft with min A; goal met 4/21 - pt propels manual w/c 75 ft with min A  Short term goal 4: Pt will tolerate gait assessment and appropriate LTG to be set; goal not met 4/22 - pt attempts to step but unable to complete successfully to attempt walking  Long Term Goals  Time Frame for Long term goals : 3 weeks- 5/4/22  Long term goal 1: Pt will complete bed mobility with supervision; not met 5/2  Long term goal 2: Pt will complete functional transfers with min A using LRAD; not met 5/2  Long term goal 3: Pt will propel manual w/c 150ft with mod I    PLAN OF CARE/SAFETY  Plan  Plan:  minutes of therapy at least 5 out of 7 days a week  Plan weeks: 3 weeks  Current Treatment Recommendations: Strengthening;Balance training;Functional mobility training;Transfer training; Endurance training;Gait training;Neuromuscular re-education;Home exercise program;Safety education & training;Patient/Caregiver education & training; Wheelchair mobility training;Equipment evaluation, education, & procurement;Positioning;Manual Therapy - Soft Tissue Mobilization; Therapeutic activities  Safety Devices  Type of Devices: All annemarie prominences offloaded; All fall risk precautions in place;Call light within reach; Patient at risk for falls;Nurse notified; Left in chair;Chair alarm in place;Gait belt  Restraints  Restraints Initially in Place: No      Therapy Time   Individual Concurrent Group Co-treatment   Time In 1330     0900   Time Out 1400     0945   Minutes 30     45     Second Session Therapy Time: Individual Concurrent Group Co-treatment   Time In      1430   Time Out      1500   Minutes      30     Timed Code Treatment Minutes:  105 mins        Sweta Kemp PT, 05/02/22 at 3:29 PM

## 2022-05-03 NOTE — PROGRESS NOTES
Nephrology Progress Note   [facility]. [facility]      This patient is a 42year old male whom we are following for RODRICK. Subjective: The patient was seen and examined. Having nausea and abdominal pain. KUB from yesterday showed ileus. Family History: No family at bedside  ROS: No fever or chills      Vitals:  BP (!) 170/94   Pulse 99   Temp 97.8 °F (36.6 °C) (Oral)   Resp 16   Ht 6' 0.5\" (1.842 m)   Wt 211 lb 11.2 oz (96 kg)   SpO2 97%   BMI 28.32 kg/m²   I/O last 3 completed shifts: In: 2491 [P.O.:720; I.V.:1771]  Out: 875 [Urine:875]  I/O this shift:  In: 5052 [P.O.:120; I.V.:1188]  Out: 0 [Urine:950; Emesis/NG output:1]    Physical Exam:  Physical Exam  Vitals reviewed. Constitutional:       General: He is not in acute distress. HENT:      Head: Normocephalic and atraumatic. Eyes:      General: No scleral icterus. Conjunctiva/sclera: Conjunctivae normal.   Cardiovascular:      Rate and Rhythm: Normal rate. Heart sounds: No friction rub. Pulmonary:      Effort: Pulmonary effort is normal. No respiratory distress. Breath sounds: No wheezing. Abdominal:      Tenderness: There is abdominal tenderness. Comments: Hypoactive bowel sounds   Musculoskeletal:      Right lower leg: No edema. Left lower leg: No edema. Neurological:      Mental Status: He is alert.            Medications:   metoprolol succinate  50 mg Oral Daily    magnesium oxide  400 mg Oral BID    QUEtiapine  100 mg Oral Nightly    QUEtiapine  25 mg Oral BID    [Held by provider] gabapentin  100 mg Oral Nightly    [Held by provider] traZODone  100 mg Oral Nightly    mineral oil-hydrophilic petrolatum   Topical Daily    insulin lispro  0-18 Units SubCUTAneous TID WC    insulin lispro  0-9 Units SubCUTAneous Nightly    insulin lispro  4 Units SubCUTAneous TID WC    insulin glargine  25 Units SubCUTAneous Nightly    metoclopramide  10 mg Oral 4x Daily AC & HS    fluticasone  2 spray Each Nostril Nightly    heparin (porcine)  5,000 Units SubCUTAneous 3 times per day    pantoprazole  40 mg Oral BID    PARoxetine  20 mg Oral Daily    multivitamin  1 tablet Oral Daily    tamsulosin  0.4 mg Oral Nightly    [Held by provider] tiZANidine  8 mg Oral BID         Labs:  Recent Labs     05/02/22  0909   WBC 6.8   HGB 10.1*   HCT 28.5*   MCV 92.7        Recent Labs     05/01/22  1018 05/02/22  0909 05/03/22  0640    139 140   K 4.5  4.5 3.8 3.8    102 103   CO2 21 23 24   GLUCOSE 167* 186* 126*   PHOS 5.5*  --   --    BUN 17 17 16   CREATININE 1.4* 1.3 1.3   LABGLOM 55* 60* 60*   GFRAA >60 >60 >60           Assessment/Plan:    Acute Kidney Injury. - possibly pre-renal, in setting of poor po intake, hypotension.  - H/O recent RODRICK requiring RRT through 3/1/22.  - now improving/stable with iv fluids, continue NS at 75cc/hr until able to tolerate PO better. - continue holding ACEi, lasix    Hyperkalemia  -stopped lisinopril  -improved      MSSA bacteremia with left foot abscess, left hand abscess, osteomyelitis, RUL abscess  - finished oral doxycycline 4/28     Multiple wounds   - wound vac to sacral wound and left foot wound  - wound care     HTN  - off lisinopril  - on metoprolol, titrated for better BP control     DM2   - insulin       Anemia  - recent GIB s/p embolization of gastroduodenal artery on 2/25 and ex-lap  - monitor and transfuse for Hb<7     H/O Atrial Fibrillation  - on  lopressor  - AC contraindicated due to bleeding risk     MS changes; not likely to be due to RODRICK  - Neurontin, Tizanidine and Trazodone on hold. - Improved. Ileus. - For CT of the A/P today. Please do not hesitate to contact me at [phone] if with questions. Thank you! Jemal Hines MD  [email or web address]  5/3/2022

## 2022-05-03 NOTE — PROGRESS NOTES
Hospitalist Progress Note      PCP: Reji Aquino MD    Date of Admission: 4/13/2022    Chief Complaint: Encephalopathy    Hospital Course: 42 y.o. male with a PMH of Diabetes mellitus type 2 poorly controlled, CHF, history of diabetic ulcer with amputation of the right great toe, Obesity, Neuropathy, CM and history of tobacco abuse who was admitted to [facility] on 1/22/22 with Sepsis/RODRICK/MSSA bacteremia-from left foot infection, Resp failure which progressed to cardiac arrest. He was d/c'd to a LTACH on 3/10/2022. Admitted to ARU on 4/13/2022. Hospitalist consulted due mental status change. Patient reportedly easily agitated and hallucinating. Psych was consulted      Subjective:       Pt report nausea with vomiting this am. Denies any abdominal pain currently.  Family at bedside     Medications:  Reviewed    Infusion Medications    sodium chloride 75 mL/hr at 05/03/22 0524    dextrose       Scheduled Medications    metoprolol succinate  50 mg Oral Daily    magnesium oxide  400 mg Oral BID    QUEtiapine  100 mg Oral Nightly    QUEtiapine  25 mg Oral BID    [Held by provider] gabapentin  100 mg Oral Nightly    [Held by provider] traZODone  100 mg Oral Nightly    mineral oil-hydrophilic petrolatum   Topical Daily    insulin lispro  0-18 Units SubCUTAneous TID WC    insulin lispro  0-9 Units SubCUTAneous Nightly    insulin lispro  4 Units SubCUTAneous TID WC    insulin glargine  25 Units SubCUTAneous Nightly    metoclopramide  10 mg Oral 4x Daily AC & HS    fluticasone  2 spray Each Nostril Nightly    heparin (porcine)  5,000 Units SubCUTAneous 3 times per day    pantoprazole  40 mg Oral BID    PARoxetine  20 mg Oral Daily    multivitamin  1 tablet Oral Daily    tamsulosin  0.4 mg Oral Nightly    [Held by provider] tiZANidine  8 mg Oral BID     PRN Meds: promethazine, senna, haloperidol, QUEtiapine, oxyCODONE, oxyCODONE, prochlorperazine, ondansetron, ondansetron, hydrALAZINE, acetaminophen, diclofenac sodium, glucose, glucagon (rDNA), dextrose, bisacodyl, aluminum & magnesium hydroxide-simethicone, polyethylene glycol, dextrose bolus (hypoglycemia) **OR** dextrose bolus (hypoglycemia)      Intake/Output Summary (Last 24 hours) at 5/3/2022 0927  Last data filed at 5/3/2022 0836  Gross per 24 hour   Intake 3199 ml   Output 1326 ml   Net 1873 ml       Physical Exam Performed:    BP (!) 170/94   Pulse 99   Temp 97.8 °F (36.6 °C) (Oral)   Resp 16   Ht 6' 0.5\" (1.842 m)   Wt 211 lb 11.2 oz (96 kg)   SpO2 97%   BMI 28.32 kg/m²     General appearance: Obese. No apparent distress, appears stated age and cooperative  HEENT:  Normal cephalic, atraumatic without obvious deformity. Conjunctivae/corneas clear. Neck: Supple, with full range of motion. No jugular venous distention. Trachea midline. Respiratory:  Normal respiratory effort. Clear to auscultation, bilaterally without Rales/Wheezes/Rhonchi. Cardiovascular:  Regular rate and rhythm with normal S1/S2 without murmurs, rubs or gallops. Abdomen: Soft, non-tender, non-distended with normal bowel sounds. Musculoskeletal:  No clubbing, cyanosis or edema bilaterally. Skin: Skin color-multiple areas of healing wounds on LE, sacral ulcer  Neurologic:  No focal deficits,  Psychiatric:  awake, not anxious appearing      Labs:   Recent Labs     05/02/22  0909   WBC 6.8   HGB 10.1*   HCT 28.5*        Recent Labs     05/01/22  1018 05/02/22  0909 05/03/22  0640    139 140   K 4.5  4.5 3.8 3.8    102 103   CO2 21 23 24   BUN 17 17 16   CREATININE 1.4* 1.3 1.3   CALCIUM 11.1* 10.5 10.4   PHOS 5.5*  --   --      No results for input(s): AST, ALT, BILIDIR, BILITOT, ALKPHOS in the last 72 hours. No results for input(s): INR in the last 72 hours. No results for input(s): Melina Breeze in the last 72 hours.     Urinalysis:      Lab Results   Component Value Date    NITRU Negative 04/27/2022    WBCUA 21-50 04/27/2022    BACTERIA Rare 01/22/2022    RBCUA 3-4 04/27/2022    BLOODU TRACE-INTACT 04/27/2022    SPECGRAV 1.010 04/27/2022    GLUCOSEU Negative 04/27/2022       Radiology:  XR ABDOMEN (KUB) (SINGLE AP VIEW)   Final Result   Possible focal ileus on the left side of the abdomen         CT HEAD WO CONTRAST   Final Result   No acute intracranial abnormality. XR CHEST PORTABLE   Final Result   Low lung volumes, with subtle bilateral perihilar opacities concerning for   multifocal pneumonia. US RENAL COMPLETE   Final Result   Unremarkable ultrasound of the kidneys and urinary bladder. Assessment/Plan:    Active Hospital Problems    Diagnosis     Moderate malnutrition ([address] [E44.0]     Critical illness myopathy [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 No overt signs of infection- infection ruled out per ID. CT head non acute. Held neurontin, zanaflex, desyrel. Psych recs appreciated. Improving with seroquel- continue med. Mentation seems to be at baseline currently          RODRICK, improving. Nephrology following     Multiple wounds: continue wound care      HTN-lisinopril held due to RODRICK. Not controlled. Mgt differed to nephro. Continue BB     PAF-now in SR, no AC d/t bleeding risk     MSSA bacteremia with left foot abscess, left hand abscess, osteomyelitis, RUL abscess  - finished oral doxycycline 4/28    Anemia:  recent GIB s/p embolization of gastroduodenal artery on 2/25 and ex-lap. No overt signs of GIB currently. hgb stable. Monitor and transfuse for Hb<7    N/V :  AXR reviewed with possible focal ileus on left side of abdomen. Lipase was normal. CT abd/pelvis to eval ordered. GI consulted. Downgrade diet to clear liquid for now. Continue antiemetics PRN, IVF. DM:  Continue insulin       Discussed with plan of care with   Dr Carmen Austin, RN     DVT Prophylaxis: heparin  Diet: ADULT DIET;  Regular; 4 carb choices (60 gm/meal)  ADULT ORAL NUTRITION SUPPLEMENT; Breakfast, Dinner, [employer];  Low Calorie/High Protein Oral Supplement  Code Status: Full Code    PT/OT Eval Status: ARU    Dispo -  Per primary team     Leny Combs MD

## 2022-05-03 NOTE — PROGRESS NOTES
Ann-Marie Will  5/3/2022  [id number]    Chief Complaint: Critical illness myopathy    Subjective:   No acute events overnight. Today Princess Gomez is seen in his room with a family member present. He reports continued nausea and vomiting. He has not had any further hallucinations. He was unable to participate in therapies this morning due to feeling ill. He is encouraged to try to work with therapies this afternoon. Discussed case with Medicine. Planning for CT abdomen pelvis with possible GI consult. ROS: denies f/c, cp, sob    Objective:  Patient Vitals for the past 24 hrs:   BP Temp Temp src Pulse Resp SpO2   05/03/22 1149 (!) 160/91 -- -- 86 -- 98 %   05/03/22 0756 (!) 170/94 97.8 °F (36.6 °C) Oral 99 16 97 %   05/03/22 0752 -- -- -- -- 16 --   05/03/22 0345 -- -- -- -- 18 --   05/02/22 2123 -- -- -- 83 16 97 %     Gen: Alert, supine in bed, appears uncomfortable  HEENT: Normocephalic, atraumatic  CV: extremities well perfused  Resp: No respiratory distress.    Abd: Soft, nontender, nondistended  Ext: No edema  Skin: [place] to sacrum and L foot with appropriate seal   Neuro: Alert, oriented, speech fluent without aphasia  Psych: appropriate mood and affect    Wt Readings from Last 3 Encounters:   04/26/22 211 lb 11.2 oz (96 kg)   03/10/22 255 lb 1.6 oz (115.7 kg)   10/14/21 282 lb (127.9 kg)       Laboratory data:   Lab Results   Component Value Date    WBC 6.8 05/02/2022    HGB 10.1 (L) 05/02/2022    HCT 28.5 (L) 05/02/2022    MCV 92.7 05/02/2022     05/02/2022       Lab Results   Component Value Date     05/03/2022    K 3.8 05/03/2022     05/03/2022    CO2 24 05/03/2022    BUN 16 05/03/2022    CREATININE 1.3 05/03/2022    GLUCOSE 126 05/03/2022    CALCIUM 10.4 05/03/2022        Therapy progress:  PT  Position Activity Restriction  Other position/activity restrictions: RUE PICC, sacral wound vac  Objective     Sit to Stand: Maximum Assistance,Dependent/Total,2 Person Assistance  Stand to sit: Maximum Assistance,Dependent/Total,2 Person Assistance  Bed to Chair: Dependent/Total     OT  PT Equipment Recommendations  Equipment Needed: No  Mobility Devices: [facility] Bed : Electric - Full (Head of Bed),Bed [place]  Other: defer to SNF     Assessment        SLP  Current Diet : Regular  Current Liquid Diet : Thin  Diet Solids Recommendation: Regular  Liquid Consistency Recommendation: Thin    Body mass index is 28.32 kg/m². Assessment and Plan:  Nain Gaitan is a 42year old male with a past medical history significant for DM2, diabetic foot ulcer with multiple amputations, HFrEF who has had a prolonged hospital course following Covid pneumonia with complications including respiratory failure, GIB, and multiple infections. He was admitted to [facility] on 4/13/22 due to functional deficits below his baseline.      Critical Illness Myopathy  - due to covid pneumonia  - PT, OT, ST     MSSA bactermia  - with left foot abscess, left hand abscess, osteomyelitis, RUL abscess  - transitioned to oral doxycycline, continued through 4/28     Multiple wounds POA  - wound vac to sacral wound and left foot wound  - wound care    Encephalopathy, improved  - etiology unclear. Workup not revealing of infectious, metabolic or intracranial etiology. - wbc, ammonia, electrolytes, lactic acid, and ABG within normal limits  - CT head negative for acute process  - ID following, low suspicion for infection  - possibly polypharmacy? Although patient had been stable on pain medications when hallucinations and encephalopathy developed.  Have wean down and discontinued scheduled dilaudid.  - gabapentin, tizanidine, and trazodone held  - seroquel per Psych  - mentation improved    Nausea and vomiting  - chronic problem, but worsening symptoms today and yesterday  - Medicine following, appreciate assistance  - XR abd showing possible ileus  - medicine obtaining CT AP, considering GI consult  - continue PRN nausea medications     HFrEF  - EF 35%  - discontinued lasix due to RODRICK, patient remains euvolemic on exam  - daily weights     RODRICK on CKD  - RODRICK due to sepsis, cardiac arrest. Required dialysis during acute stay  - Cr trending down.  Previously been in 1-1.2 range  - stopped lasix  - fluids per Nephrology, appreciate assistance    Hyperkalemia, resolved  - suspect due to kidney dysfunction  - s/p lokemia 4/26  - Nephro following    Paroxysmal Atrial Fibrillation  - BB  - AC contraindicated due to bleeding risk    HTN  - lopressor 12.5 mg BID  - discontinued lisinopril     DM2 complicated by neuropathy  - home regimen lantus 75, prandial 15, SSI  - lantus 25, prandial 4U, SSI  - glucose remains above goal, holding off on adjusting regimen due to worsening nausea and vomiting     Diabetic Neuropathy  - gabapentin discontinued     Acute blood loss anemia  - recent GIB s/p embolization of gastroduodenal artery on 2/25 and ex-lap  - monitor and transfuse for Hb<7     History of GIB  - PPI      Urinary Retention  - flomax  - ISC with high volumes, rea replaced  - will need follow up with Urology     Chronic Pain  - discontinued dilaudid due to AMS  - oxycodone PRN     Bowel: Per protocol  Bladder: Per protocol  Sleep: Trazodone PRN  DVT PPx: heparin    Services/DME: home PT, OT, nursing  EDOD: TBD     Follow up appointments: PCP, Cardiology, wound care, Urology    Electronically signed by Adriana Claros MD on 5/3/2022 at 11:52 AM

## 2022-05-03 NOTE — CARE COORDINATION
CM left voice message with [facility] admission coordinator for [place] CL[place] (swing bed faclity) for a referral. Awaiting call back. Aditya Stinson RN     1630 CM received a call from [facility] admission coordinator for [place] (swing bed faclity). [facility] wanted clinicals sent via secured fax, Miguel Mccullough acknowledged and sent documents for review.  Aditya Stinson RN

## 2022-05-03 NOTE — PROGRESS NOTES
Occupational Therapy  Facility/Department: [facility] ARU  Rehabilitation Occupational Therapy Daily Treatment Note    Date: 5/3/22  Patient Name: Cheryl Sanchez       Room: [facility]4/7695-30  MRN: [id number]  Account: [de-identified]   : 1975  (41 y.o.) Gender: male                    Past Medical History:  has a past medical history of Abscess of left foot, Abscess of left hand, Acute encephalopathy, Acute hypoxemic respiratory failure ([address] Acute osteomyelitis of left hand ([address] Acute osteomyelitis of right hallux ([address] Cardiopulmonary arrest ([address] Cellulitis of left foot, CHF (congestive heart failure) ([address] Chronic osteomyelitis of left foot ([address] Closed displaced fracture of distal phalanx of right little finger, Clostridium difficile infection, Diabetic ulcer of left forefoot associated with type 2 diabetes mellitus, with necrosis of bone ([address] Diabetic ulcer of right great toe associated with type 2 diabetes mellitus, with necrosis of bone ([address] Enterococcal infection, 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 Past Surgical History:   has a past surgical history that includes Tonsillectomy; [place] tooth extraction; Knee arthroscopy (Left, 08/15/2013); Toe amputation (Right, 2019); other surgical history (Left, 2020); Toe amputation (Left, 2020); Toe amputation (Left, 10/22/2020); Foot Debridement (Left, 2022); Arm Surgery (Left, 2022);  IR NONTUNNELED VASCULAR CATHETER > 5 YEARS (02/11/2022); Upper gastrointestinal endoscopy (N/A, 02/17/2022); IR EMBOLIZATION HEMORRHAGE (Right, 02/25/2022); Upper gastrointestinal endoscopy (N/A, 2/27/2022); laparotomy (N/A, 2/27/2022); Foot Debridement (Left, 3/8/2022); Cholecystectomy; Dilatation, esophagus; and Endoscopy, colon, diagnostic. Restrictions  Restrictions/Precautions: General Precautions; Fall Risk  Other position/activity restrictions: RUE PICC, sacral wound vac    Subjective  Subjective: Pt in supine, agreeable to OT/PT cotx. Pt reports increased nausea, but stable BP and agreeable to session. Pt reports 5/10 pain  Restrictions/Precautions: General Precautions; Fall Risk             Objective     Cognition  Overall Cognitive Status: Exceptions  Arousal/Alertness: Delayed responses to stimuli  Following Commands: Follows one step commands with repetition; Follows multistep commands with repitition  Attention Span: Attends with cues to redirect  Memory: Decreased recall of precautions;Decreased short term memory  Safety Judgement: Decreased awareness of need for assistance;Decreased awareness of need for safety  Problem Solving: Assistance required to correct errors made;Assistance required to implement solutions;Assistance required to generate solutions;Assistance required to identify errors made  Insights: Decreased awareness of deficits  Initiation: Requires cues for some  Sequencing: Requires cues for some  Orientation  Overall Orientation Status: Within Functional Limits         ADL  Feeding  Assistance Level: Increased time to complete;Set-up; Stand by assist;Supervision  Skilled Clinical Factors: sitting in recliner, cues for scanning and intiation  Grooming/Oral Hygiene  Assistance Level: Increased time to complete;Supervision;Set-up  Skilled Clinical Factors: sitting EOB  Upper Extremity Dressing  Assistance Level: Increased time to complete;Minimal assistance;Verbal cues  Skilled Clinical Factors: sitting EOB, cues for scanning and intiation  Lower Extremity Dressing  Assistance Level: Dependent; Requires x 2 assistance  Skilled Clinical Factors: depA for donning/doffing sock and shoes  Putting On/Taking Off Footwear  Assistance Level: Dependent  Skilled Clinical Factors: depA for donning/doffing sock and shoes     Functional Mobility  Device:  (WC room <> gym, sit pivot EOB to WC, steady WC to EOB to supine)  Assistance Level: Minimal assistance; Requires x 2 assistance  Skilled Clinical Factors: Luisa x1 modA x1 sit pivot EOB to WC, steady sit <> stand 3 trials, 2 trials standing to RW, 5 trials standing to // bars (able to take 2 steps forwards with modA x2)  Bed Mobility  Overall Assistance Level: Minimal Assistance  Additional Factors: With handrails; Head of bed raised; Increased time to complete;Verbal cues  Bridging  Assistance Level: Minimal assistance  Skilled Clinical Factors: to manage pants  Roll Left  Assistance Level: Minimal assistance  Skilled Clinical Factors: to manage pants  Roll Right  Assistance Level: Minimal assistance  Skilled Clinical Factors: to manage pants  Supine  Assistance Level: Minimal assistance  Supine to Sit  Assistance Level: Minimal assistance  Skilled Clinical Factors: HOB elevated use of bed rail  Scooting  Assistance Level: Minimal assistance; Requires x 2 assistance  Skilled Clinical Factors: cues for weight shifting L vs R  Transfers  Surface: To bed;From bed; To chair with arms;From chair with arms  Additional Factors: Increased time to complete; Mat raised; With handrails;Verbal cues; Hand placement cues  Device: Walker (trialed RW sit <> Stand modA x2, sit <> Stand 3 trials to steady Luisa x2, Luisa x1 modA x1 sit <> stand to // bars)  Sit to Stand  Assistance Level: Minimal assistance; Moderate assistance; Requires x 2 assistance  Skilled Clinical Factors: min-modA x2 sit <> stand to RW vs steady vs // bars  Stand to Sit  Assistance Level: Moderate assistance; Requires x 2 assistance;Minimal assistance  Skilled Clinical Factors: min-modA x2 sit <> stand to RW vs steady vs // bars  Bed To/From Chair  Technique: Sit pivot  Assistance Level: Requires x 2 assistance;Maximum assistance  Skilled Clinical Factors: maxA x2 sit/lateral pivot EOB to WC  Sit Pivot  Assistance Level: Requires x 2 assistance; Moderate assistance  Skilled Clinical Factors: maxA x2 sit/lateral pivot EOB to R [address]         Assessment  Assessment  Assessment: Pt seen for OT/PT cotx to progress towards goals and assess functional transfer training/tolerance. Pt continues to demo decreased endurance, strength, tolerance to simple ADLs and unsupported sitting. Pt was able to complete bed mobility Luisa x1, Luisa x2 for scooting laterally and forward with repositioning/weight shifting. Pt was able to complete a sit pivot from EOB to WC Luisa x1 modA x1 and then trialed standing balance/stepping forwards/backwards in // bars Luisa x2 sit <> stand modA x2 for step forward x1 trial each LE. Pt was limited by endurance but motivated and did well with rest breaks and time. Pt was educated on BUE AROM and BLE strengthening exercises bed level and in recliner to promote increased strength/endurance for functional transfers. Cont to recommend further therapy to addrss goals and safe DC home, recommend SNF level of care post DC from ARU due to no 24 hour physical supervision noted. Cont OT POC. Activity Tolerance: Patient limited by fatigue;Patient limited by endurance; Patient limited by pain  Discharge Recommendations: Continue to assess pending progress; Patient would benefit from continued therapy after discharge;Subacute/Skilled Nursing Facility  OT Equipment Recommendations  Equipment Needed: Yes  Mobility Devices: ADL Assistive Devices  ADL Assistive Devices: Grab Bars - shower;Transfer Tub Bench  Other: CTA pending progress  Safety Devices  Safety Devices in place: Yes  Type of devices: Nurse notified;Call light within reach; Left in bed;Bed alarm in place; Patient at risk for falls; All fall risk precautions in place;Gait belt  Restraints  Initially in place: No    Patient Education  Education  Education Given To: Patient; Family  Education Provided: ADL Function;Role of Therapy;Plan of Care;Equipment;Home Exercise Program;Precautions; Safety; Energy Conservation; Family Education  Education Provided Comments: improtance of OOB activities, strengthening exercises for transfers/ADLs, pain management strategies, repositioning strategies  Education Method: Demonstration;Verbal;Teach Back  Barriers to Learning: None  Education Outcome: Verbalized understanding;Demonstrated understanding;Continued education needed    Plan  Plan  Times per Week: 5-7x per week  Times per Day: Daily  Current Treatment Recommendations: Strengthening;ROM;Balance training;Functional mobility training; Endurance training;Pain management; Safety education & training;Patient/Caregiver education & training;Positioning;Self-Care / ADL;Neuromuscular re-education; Wheelchair mobility training;Equipment evaluation, education, & procurement    Goals  Patient Goals   Patient goals : 1 week (4/21)-EXTEND TO 4/27  Short Term Goals  Time Frame for Short term goals: Pt will complete UB dressing with min A- GOAL MET; Pt SBA for UB dressing at EOB  Short Term Goal 1: Pt will complete LB dressing with mod A  Short Term Goal 2: Pt will complete LB bathing with mod A-GOAL MET 4/26  Short Term Goal 3: Pt will complete UB bathing with SBA-GOAL MET 4/26  Short Term Goal 4: Pt will complete 3 grooming task at w/c level-GOAL MET 4/26  Short Term Goal 5: Pt will complete x20 reps of BUE HEP to improve UB strength/endurance for repositioning and UB ADLs- GOAL MET 4/20;  Pt completing x20 BUE AROM  Long Term Goals  Time Frame for Long term goals : 3 weeks (5/05)  Long Term Goal 1: Pt will complete total body dressing with supv  Long Term Goal 2: Pt will complete total body bathing with supv  Long Term Goal 3: Pt will complete 3 grooming tasks at sink with mod I  Long Term Goal 4: Pt will perform functional transfers with mod I-DOWNGRADE TO MODA X1 due to inconsistent progress  Additional Goals?: No    Therapy Time   Individual Concurrent Group Co-treatment   Time In       1350   Time Out       1500   Minutes       70   Timed Code Treatment Minutes: 530 Desiree Carrion, OTR/L

## 2022-05-03 NOTE — PATIENT CARE CONFERENCE
[facility]  Inpatient Rehabilitation  Weekly Team Conference Note    Date: 2022  Patient Name: Tiffanie Proctor        MRN: [id number]    : 1975  (42 y.o.)  Gender: male   Referring Practitioner: Deepali Alejo MD  Diagnosis: sepsis      Interventions to be utilized toward barriers to discharge, per discipline:  [address] observed barriers to dc: Pain, Decreased motivation, Decreased endurance, Decreased sensation, Upper extremity weakness, Lower extremity weakness, Medical complications, Skin Care, Wound Care, Medication managment and Munoz cath  Nursing interventions:Assist with ADLs, medication management, wound care management  Family Education: No  Fall Risk:  Yes      Physical therapy observed barriers to dc:    Baseline: independent, works full time, lives with wife   Current level: mod-max A x2 for sit pivot transfers, mod A x2 for STS with herb lopez, mod A x1 + Min A x1 in // bars   Barriers to DC: weakness, endurance, BP, nausea, pain, no  caregiver assistance available   Needs in order to achieve dc home/next level of care: SNF      Physical therapy interventions:   Current Treatment Recommendations: Strengthening,Balance training,Functional mobility training,Transfer training,Endurance training,Gait training,Neuromuscular re-education,Home exercise program,Safety education & training,Patient/Caregiver education & training,Wheelchair mobility training,Equipment evaluation, education, & procurement,Positioning,Manual Therapy - Soft Tissue Mobilization,Therapeutic activities      PHYSICAL THERAPY  PT Equipment Recommendations  Equipment Needed: No  Mobility Devices: [facility] Bed : Electric - Full (Head of Bed),Bed Wilhem[place]  Other: defer to SNF    Assessment: Patient seen as co-treat with OT to facilitate safe transfers and standing balance training to progress toward improving pt's functional mobility.   Patient completed multiple stands in // bars this session, up to 2 mins, 10 sec with max A x2 and cues for posture. Pt demos significant improvement in activity tolerance; however, continues to require prolonged seated rest breaks for recovery. Will continue to progress strength and endurance to progress functional transfers. Occupational therapy observed barriers to dc:    Baseline: Lives with spouse, Independent with ADLs/IADLs and mobility,  driving              Current level: Dependent for toileting, CGA to setup for UB  ADLs, Luisa-CGA  for bed mobility, max Ax2 for transfers with use of Fidel Taveras              Barriers to DC: generalized weakness, wound recovery, decreased  activity tolerance              Needs in order to achieve dc home/next level of care: carryover of c ompensation techniques, min A for functional transfers, CTA for DME  needs; pt will need assistance at home-- wife cannot provide will need  SNF if cannot get longer days at ARU    Occupational Therapy interventions:  Current Treatment Recommendations: Strengthening,ROM,Balance training,Functional mobility training,Endurance training,Pain management,Safety education & training,Patient/Caregiver education & training,Positioning,Self-Care / Nurys Will re-education,Wheelchair mobility training,Equipment evaluation, education, & procurement      OCCUPATIONAL THERAPY    Pt seen for OT/PT cotx to progress towards goals and assess functional transfer training/tolerance. Pt continues to demo decreased endurance, strength, tolerance to simple ADLs and unsupported sitting. Pt was able to complete bed mobility Luisa x1, Luisa x2 for scooting laterally and forward with repositioning/weight shifting. Pt was able to complete a sit pivot from EOB to WC Luisa x1 modA x1 and then trialed standing balance/stepping forwards/backwards in // bars Luisa x2 sit <> stand modA x2 for step forward x1 trial each LE.   Pt was limited by endurance but motivated and did well with rest breaks and time. Pt was educated on BUE AROM and BLE strengthening exercises bed level and in recliner to promote increased strength/endurance for functional transfers. Cont to recommend further therapy to addrss goals and safe DC home, recommend SNF level of care post DC from ARU due to no 24 hour physical supervision noted. Cont OT POC. SPEECH THERAPY   No speech therapy for this patient       NUTRITION  Weight: 211 lb 11.2 oz (96 kg) / Body mass index is 28.32 kg/m². Diet Order: ADULT ORAL NUTRITION SUPPLEMENT; Breakfast, Dinner, Lunch; Low Calorie/High Protein Oral Supplement  ADULT DIET; Regular; 4 carb choices (60 gm/meal)  PO Meals Eaten (%): 1 - 25%  Education: Declined     CASE MANAGEMENT  Assessment: 42 yr old male. DX:Critical illness myopathy. Therapy recommendations are patient would benefit from continued therapy after discharge;Subacute/Skilled Nursing Facility. [facility] referral sent. DME deferred to . [facility] following as well. Family training complete. CM will continue to support for discharge needs. Interdisciplinary Goals:   1.) Pt will complete transfers with min A and LRAD  2.) Pt will complete total body dressing with supv  3.) Pt will have improved pain management       Discharge Plan   Estimated discharge date: 5/6/22  [address]swing bed unit) vs SNF  Pass:No  Services at Discharge: Defer to facility. Continued therapy services after d/c; PT/OT/nursing/wound care  Equipment at Discharge: Defer to facility.      Team Members Present at Conference:  : Sheridan Ngo RN  Occupational Therapist: Jose Shah, OTR/L  Physical Therapist: Chani Drummond, PT  Speech Therapist: N/A  Nurse: Caridad Parsons, SURESH  Dietician: Kaylen Martins, RD, LD  : Karissa Pierre, OTR/L  Psychiatry: N/A    Family members present at conference: No      I led this team conference and I approve the established interdisciplinary plan of care as documented within the medical record of Brayan Dale. MD: Ghassan Hernández.  Sweta Corbin MD  05/04/22  11:37 AM

## 2022-05-03 NOTE — CARE COORDINATION
Clinicals faxed to insurance for .   Monico Beasley MPH, RRT  Clinical Liaison [address]  (W)[phone] ([phone]   Electronically signed by Monico Beasley on 5/3/2022 at 8:44 AM

## 2022-05-03 NOTE — PROGRESS NOTES
Physical Therapy  Facility/Department: [facility]  Rehabilitation Physical Therapy Treatment Note    NAME: Tiffanie Proctor  : 1975 (42 y.o.)  MRN: [id number]  CODE STATUS: Full Code    Date of Service: 5/3/22       Restrictions:  Restrictions/Precautions: General Precautions; Fall Risk  Position Activity Restriction  Other position/activity restrictions: RUE PICC, sacral wound vac     SUBJECTIVE  Subjective  Subjective: pt found in bed, agreeable to supine ther ex only  Pain: pt rates low back pain /10        Post Treatment Pain Screening         OBJECTIVE            PT Exercises  A/AROM Exercises: pt completed x15 reps of BLE supine heel slides, glute sets, quad sets. x 15 reps of BUE bicep curls, punch outs, forearm pronation/supination      ASSESSMENT/PROGRESS TOWARDS GOALS  Vital Signs  Pulse: 86  Heart Rate Source: Monitor  BP: (!) 160/91  BP Location: Left upper arm  MAP (Calculated): 114  SpO2: 98 %  O2 Device: None (Room air)    Assessment  Assessment: pt found supine in bed. initially declines therapy however following education/encouragement is agreeable. tolerated ther ex with rest breaks with no complaints. continue to progress mobility as tolerated. Activity Tolerance: Patient limited by fatigue;Patient limited by endurance; Patient limited by pain  Discharge Recommendations: Subacute/Skilled Nursing Facility  PT Equipment Recommendations  Equipment Needed: No  Other: defer to SNF    Addendum Second Session Shannon Coma, PT)  Assessment: Pt seen as co-treat with OT to facilitate safe transfers training. Pt in bed at beginning and end of session. Pt limited by fatigue and pain, especially in R knee. R knee brace applied for final  // bars with pt noting some improvement but too fatigued to continue to stand. Pt demos progress this date from previous date. Pt with minimal report of dizziness during session.      Vitals: 98%, 70 bpm, 138/82    Bed mobility: Pt completes supine>sit with min A and sit>supine with mod A for BLE. Pt required max A X2 to scoot toward HOB in supine. Transfers: Pt completes STS from EOB with herb stedy and min A x2. Pt stood x1 min in herb stedy with BUE support and CGA. Pt completes 5 reps of STS in // bars with mod A x1 + min A x1 with dax knee block and assist at trunk. Pt requires cues for glute and knee contraction in standing for posture. Pt transferred bed>w/c toward L with sit pivot technique and mod A x2. Pt transferred back to bed from w/c with herb stedy and mod A x2. Pt     Gait: Pt takes 2x 2 steps forward in // bars with mod A x1 + min A x1 with w/c follow (dependent overall). Pt trials ambulation on other stand reps with pt only able to take 1 step with pt stating needing to sit. Pt requires cues for weight shift and assistance for proper foot placement to balance. Pt stating need to sit d/t chronic R knee pain.        Goals  Patient Goals   Patient goals : \"to walk again\"  Short Term Goals  Time Frame for Short term goals: 10 days- 4/22/22  Short term goal 1: Pt will complete bed mobility with min A; goal partially met 4/22 - pt completes supine>sit with min A and up to mod A x2 for sit>supine  Short term goal 2: Pt will complete functional transfers with max A x 1 using LRAD; not met 4/22 - pt requires up to max A x2 in // bars and herb stedy  Short term goal 3: Pt will propel manual w/c 50ft with min A; goal met 4/21 - pt propels manual w/c 75 ft with min A  Short term goal 4: Pt will tolerate gait assessment and appropriate LTG to be set; goal not met 4/22 - pt attempts to step but unable to complete successfully to attempt walking  Long Term Goals  Time Frame for Long term goals : 3 weeks- 5/4/22  Long term goal 1: Pt will complete bed mobility with supervision; not met 5/2  Long term goal 2: Pt will complete functional transfers with min A using LRAD; not met 5/2  Long term goal 3: Pt will propel manual w/c 150ft with mod I    PLAN OF CARE/SAFETY  Plan  Plan:  minutes of therapy at least 5 out of 7 days a week  Plan weeks: 3 weeks  Current Treatment Recommendations: Strengthening;Balance training;Functional mobility training;Transfer training; Endurance training;Gait training;Neuromuscular re-education;Home exercise program;Safety education & training;Patient/Caregiver education & training; Wheelchair mobility training;Equipment evaluation, education, & procurement;Positioning;Manual Therapy - Soft Tissue Mobilization; Therapeutic activities  Safety Devices  Type of Devices: All annemarie prominences offloaded; All fall risk precautions in place;Call light within reach; Patient at risk for falls;Nurse notified; Left in chair;Chair alarm in place;Gait belt      Therapy Time   Individual Concurrent Group Co-treatment   Time In 1100         Time Out 1130         Minutes 30           Timed Code Treatment Minutes: 30 Minutes     Second Session Therapy Time:   Individual Concurrent Group Co-treatment   Time In 1350         Time Out 1500         Minutes 70           Timed Code Treatment Minutes:  100 mins     Corrina Reyes PT, 05/03/22 at 11:54 AM

## 2022-05-03 NOTE — PROGRESS NOTES
Per unit nurse Wound Care requested to check left foot wound for smell. No odor noted to foot wound. Area red with granulation rising from wound bed. Edges attached. Minor scabbing around edges. No slough noted. Continues with daily dressing changes as ordered. Spouse in room with patient, both spouse and patient assured wound bed is healing with good granulation. 05/03/22 1616   Incision 03/08/22 Foot Anterior; Left   Date First Assessed/Time First Assessed: 03/08/22 1142   Primary Wound Type: Surgical Type  Location: Foot  Wound Location Orientation: Anterior; Left  Wound Description (Comments): open red   Wound Image    Dressing Status New dressing applied   Dressing Change Due 05/04/22   Incision Cleansed Cleansed with saline   Dressing/Treatment Barrier film; Moist to moist   Incision Length (cm) 0.5   Incision Width (cm) 3.5 cm   Incision Depth (cm) 0.1 cm   Margins   (attached defined)   Incision Assessment Epithelialization   Drainage Amount Scant   Drainage Description Serous   Odor None   Shanita-incision Assessment Dry/flaky; Intact   Left foot

## 2022-05-04 NOTE — PROGRESS NOTES
Physical Therapy  Facility/Department: [facility]  Rehabilitation Physical Therapy Treatment Note    NAME: Leonidas Cobb  : 1975 (42 y.o.)  MRN: [id number]  CODE STATUS: Full Code    Date of Service: 22       Restrictions:  Restrictions/Precautions: General Precautions; Fall Risk  Position Activity Restriction  Other position/activity restrictions: RUE PICC, sacral wound vac     SUBJECTIVE  Subjective  Subjective: pt in bed, agreeable to co-treat  Pain: pt reporting 7/10 in low back and hips dax        OBJECTIVE  Cognition  Overall Cognitive Status: Exceptions  Arousal/Alertness: Delayed responses to stimuli  Following Commands: Follows one step commands with repetition; Follows multistep commands with repitition  Attention Span: Attends with cues to redirect  Memory: Decreased recall of precautions;Decreased short term memory  Safety Judgement: Decreased awareness of need for assistance;Decreased awareness of need for safety  Problem Solving: Assistance required to correct errors made;Assistance required to implement solutions;Assistance required to generate solutions;Assistance required to identify errors made  Insights: Decreased awareness of deficits  Initiation: Requires cues for some  Sequencing: Requires cues for some  Orientation  Overall Orientation Status: Within Functional Limits    Functional Mobility  Supine to Sit  Assistance Level: Minimal assistance (HOB elevated)  Sit to Stand  Assistance Level: Moderate assistance; Requires x 2 assistance (mod A x2 with herb stedy and in // bars)  Stand to Sit  Assistance Level: Moderate assistance; Requires x 2 assistance (mod A x2 with herb stedy and in // bars)  Bed To/From Chair  Assistance Level: Dependent (herb stedy bed<>w/c)      Environmental Mobility  Ambulation  Surface: Level surface  Device: Parallel Bars  Activity Comments: Pt completes 5 reps of ambulation, taking 2-3 steps BLE at a time.  Pt requires assist for intermittent knee block, BLE advancement, cues for weight shift, and assist at trunk for balance and weight shift. Balance beam placed between feet to widen CRYSTAL and prevent scissor gait following first trial.  Assistance Level: Moderate assistance; Requires x 2 assistance;Dependent (mod A x2 in // bars)  Gait Deviations: Slow emili;Decreased step length bilateral;Decreased step length right;Decreased step length left;Decreased heel strike right;Decreased heel strike left;Narrow base of support  Wheelchair  Surface: Level surface  Device: Standard wheelchair  Assistance Required to Manage Parts: Brakes  Assistance Level for Propulsion: Stand by assist  Propulsion Method: Bilateral upper extremities  Propulsion Quality: Slow velocity; Short strokes  Propulsion Distance: 75 ft  Skilled Clinical Factors: Pt navigates 2 turns without cues                Education: Pt educated on importance of progressive mobility, standing and gait training, and safety - demos understanding    ASSESSMENT/PROGRESS TOWARDS GOALS       Assessment  Assessment: Patient seen as co-treat with OT to facilitate safe transfers and gait training. Patient completed transfers 5x in // bars with mod A x2 with dax knee block and assist at hips/trunk. Pt completed 5 bouts of ambulation in // bars with mod A x2 with PT facilitating steps and foot placement and OT facilitating balance and weight shift. Pt benefits from balance beam placed between feet to widen CRYSTAL and prevent scissor gait. Pt fatiguing quickly and requires prolonged rest breaks. Activity Tolerance: Patient limited by fatigue;Patient limited by endurance; Patient limited by pain  Discharge Recommendations: Subacute/Skilled Nursing Facility  PT Equipment Recommendations  Equipment Needed: No  Other: defer to SNF    Addendum Second Session  Assessment: Pt seen for transfers and LE strengthening. Pt transferred to chair with assist from PCA. Upon sitting ~10 mins, pt reports need for toileting for BM.      Vitals:142/80, 98%, 88 bpm     Bed mobility: supine>sit min A with HOB elevated, increased time    Transfers: Pt completes transfers from bed and recliner with herb stedy and mod A x2. Pt transferred to toilet with herb stedy (dependent). Exercises:  -1x 15 BLE LAQ  -1x 15 BLE seated marches    Addendum Third Session  Assessment: Pt seen as co-treat with OT to facilitate safe toilet transfer. Pt returned to bed at end of session per pt request.    Bed mobility: sit>supine with mod A x2    Transfers: Pt completes transfer from toilet with herb stedy and max A. Pt stood ~30 seconds in herb stedy with CGA while OT facilitating pericare. Pt transferred toilet>bed with herb stedy dependently. Goals  Patient Goals   Patient goals : \"to walk again\"  Short Term Goals  Time Frame for Short term goals: 10 days- 4/22/22  Short term goal 1: Pt will complete bed mobility with min A; goal partially met 4/22 - pt completes supine>sit with min A and up to mod A x2 for sit>supine  Short term goal 2: Pt will complete functional transfers with max A x 1 using LRAD; not met 4/22 - pt requires up to max A x2 in // bars and herb stedy  Short term goal 3: Pt will propel manual w/c 50ft with min A; goal met 4/21 - pt propels manual w/c 75 ft with min A  Short term goal 4: Pt will tolerate gait assessment and appropriate LTG to be set; goal not met 4/22 - pt attempts to step but unable to complete successfully to attempt walking  Long Term Goals  Time Frame for Long term goals : 3 weeks- 5/4/22 - extended to 5/7 d/t longer than expected LOS  Long term goal 1: Pt will complete bed mobility with supervision;   Long term goal 2: Pt will complete functional transfers with min A using LRAD; not met 5/2  Long term goal 3: Pt will propel manual w/c 150ft with mod I    PLAN OF CARE/SAFETY  Plan  Plan:  minutes of therapy at least 5 out of 7 days a week  Plan weeks: 3 weeks  Current Treatment Recommendations: Strengthening;Balance training;Functional mobility training;Transfer training; Endurance training;Gait training;Neuromuscular re-education;Home exercise program;Safety education & training;Patient/Caregiver education & training; Wheelchair mobility training;Equipment evaluation, education, & procurement;Positioning;Manual Therapy - Soft Tissue Mobilization; Therapeutic activities  Safety Devices  Type of Devices: All annemarie prominences offloaded; All fall risk precautions in place;Call light within reach; Patient at risk for falls;Nurse notified;Gait belt;Left in bed;Bed alarm in place  Restraints  Restraints Initially in Place: No      Therapy Time   Individual Concurrent Group Co-treatment   Time In      0930   Time Out      1030   Minutes      60     Timed Code Treatment Minutes: 60 Minutes     Second Session Therapy Time:   Individual Concurrent Group Co-treatment   Time In 1300     1330   Time Out 1330     1338   Minutes 30     8     Timed Code Treatment Minutes:  98 minutes      Delonte Blanchard PT, 05/04/22 at 4:08 PM

## 2022-05-04 NOTE — PROGRESS NOTES
Nephrology Progress Note   [facility]      This patient is a 42year old male whom we are following for RODRICK. Subjective: The patient was seen and examined; he was resting comfortably with no acute events noted overnight. ROS: No fever or chills. Social: Family at bedside. Vitals:  BP (!) 181/96   Pulse 93   Temp 98.3 °F (36.8 °C) (Oral)   Resp 16   Ht 6' 0.5\" (1.842 m)   Wt 211 lb 11.2 oz (96 kg)   SpO2 98%   BMI 28.32 kg/m²   I/O last 3 completed shifts: In: 5212.7 [P.O.:420; I.V.:4792.7]  Out: 1931 [Urine:4650; Emesis/NG output:1]  I/O this shift:  In: 360 [P.O.:360]  Out: 551 [Urine:550; Emesis/NG output:1]    Physical Exam:  Physical Exam  Vitals reviewed. Constitutional:       General: He is not in acute distress. HENT:      Head: Normocephalic and atraumatic. Eyes:      General: No scleral icterus. Conjunctiva/sclera: Conjunctivae normal.   Cardiovascular:      Rate and Rhythm: Normal rate. Heart sounds: No friction rub. Pulmonary:      Effort: Pulmonary effort is normal. No respiratory distress. Breath sounds: No wheezing. Abdominal:      Tenderness: There is abdominal tenderness. Comments: Hypoactive bowel sounds   Musculoskeletal:      Right lower leg: No edema. Left lower leg: No edema. Neurological:      Mental Status: He is alert.            Medications:   metoprolol succinate  50 mg Oral Daily    magnesium oxide  400 mg Oral BID    QUEtiapine  100 mg Oral Nightly    QUEtiapine  25 mg Oral BID    mineral oil-hydrophilic petrolatum   Topical Daily    insulin lispro  0-18 Units SubCUTAneous TID WC    insulin lispro  0-9 Units SubCUTAneous Nightly    insulin glargine  25 Units SubCUTAneous Nightly    metoclopramide  10 mg Oral 4x Daily AC & HS    fluticasone  2 spray Each Nostril Nightly    heparin (porcine)  5,000 Units SubCUTAneous 3 times per day    pantoprazole  40 mg Oral BID    PARoxetine  20 mg Oral Daily    multivitamin  1 tablet Oral Daily    tamsulosin  0.4 mg Oral Nightly         Labs:  Recent Labs     05/02/22  0909   WBC 6.8   HGB 10.1*   HCT 28.5*   MCV 92.7        Recent Labs     05/02/22  0909 05/03/22  0640 05/04/22  0659    140 139   K 3.8 3.8 3.7    103 103   CO2 23 24 24   GLUCOSE 186* 126* 130*   BUN 17 16 14   CREATININE 1.3 1.3 1.2   LABGLOM 60* 60* >60   GFRAA >60 >60 >60       Assessment/Plan:    Acute Kidney Injury. - possibly pre-renal, in setting of poor po intake, hypotension.  - H/O recent RODRICK requiring RRT through 3/1/22.  - now improving with IV fluids, monitor  - continue holding ACEi, lasix    Hyperkalemia  -stopped lisinopril  -improved      MSSA bacteremia with left foot abscess, left hand abscess, osteomyelitis, RUL abscess  - finished oral doxycycline 4/28     Multiple wounds   - wound vac to sacral wound and left foot wound  - wound care     HTN  - off lisinopril  - on metoprolol, titrated for better BP control     DM2   - insulin       Anemia  - recent GIB s/p embolization of gastroduodenal artery on 2/25 and ex-lap  - monitor and transfuse for Hb<7     H/O Atrial Fibrillation  - on  lopressor  - AC contraindicated due to bleeding risk     MS changes; not likely to be due to RODRICK  - Neurontin, Tizanidine and Trazodone on hold. - Improved.

## 2022-05-04 NOTE — PLAN OF CARE
Problem: ABCDS Injury Assessment  Goal: Absence of physical injury  Outcome: Progressing   Safety maintained throughout shift.

## 2022-05-04 NOTE — PROGRESS NOTES
Kylah Marilin  5/4/2022  [id number]    Chief Complaint: Critical illness myopathy    Subjective:   No acute events overnight. Today Hawk is seen in his room. He reports continue nausea, but states he feels improved from yesterday. He denies any confusion or hallucinations. He reports increased pain and would like to restart muscle relaxer. ROS: denies f/c, cp, sob    Objective:  Patient Vitals for the past 24 hrs:   BP Temp Temp src Pulse Resp SpO2   05/04/22 1113 -- -- -- -- 16 --   05/04/22 0824 (!) 181/96 98.3 °F (36.8 °C) Oral 93 16 98 %   05/03/22 2045 (!) 161/88 98 °F (36.7 °C) Oral 73 18 99 %     Gen: Alert, supine in bed, NAD  HEENT: Normocephalic, atraumatic  CV: extremities well perfused  Resp: No respiratory distress.    Abd: Soft, nontender, nondistended  Ext: No edema  Skin: [place] to sacrum and L foot with appropriate seal   Neuro: Alert, oriented, speech fluent without aphasia  Psych: appropriate mood and affect    Wt Readings from Last 3 Encounters:   04/26/22 211 lb 11.2 oz (96 kg)   03/10/22 255 lb 1.6 oz (115.7 kg)   10/14/21 282 lb (127.9 kg)       Laboratory data:   Lab Results   Component Value Date    WBC 6.8 05/02/2022    HGB 10.1 (L) 05/02/2022    HCT 28.5 (L) 05/02/2022    MCV 92.7 05/02/2022     05/02/2022       Lab Results   Component Value Date     05/04/2022    K 3.7 05/04/2022     05/04/2022    CO2 24 05/04/2022    BUN 14 05/04/2022    CREATININE 1.2 05/04/2022    GLUCOSE 130 05/04/2022    CALCIUM 10.0 05/04/2022        Therapy progress:  PT  Position Activity Restriction  Other position/activity restrictions: RUE PICC, sacral wound vac  Objective     Sit to Stand: Maximum Assistance,Dependent/Total,2 Person Assistance  Stand to sit: Maximum Assistance,Dependent/Total,2 Person Assistance  Bed to Chair: Dependent/Total     OT  PT Equipment Recommendations  Equipment Needed: No  Mobility Devices: [facility] Bed : Electric - Full (Head of Bed),Bed Rails - Riki Bautista  Other: defer to SNF     Assessment        SLP  Current Diet : Regular  Current Liquid Diet : Thin  Diet Solids Recommendation: Regular  Liquid Consistency Recommendation: Thin    Body mass index is 28.32 kg/m². Assessment and Plan:  Shankar Brooks is a 42year old male with a past medical history significant for DM2, diabetic foot ulcer with multiple amputations, HFrEF who has had a prolonged hospital course following Covid pneumonia with complications including respiratory failure, GIB, and multiple infections. He was admitted to [facility] on 4/13/22 due to functional deficits below his baseline.      Critical Illness Myopathy  - due to covid pneumonia  - PT, OT, ST     MSSA bactermia  - with left foot abscess, left hand abscess, osteomyelitis, RUL abscess  - transitioned to oral doxycycline, continued through 4/28     Multiple wounds POA  - wound vac to sacral wound and left foot wound  - wound care    Encephalopathy, improved  - etiology unclear. Workup not revealing of infectious, metabolic or intracranial etiology. - wbc, ammonia, electrolytes, lactic acid, and ABG within normal limits  - CT head negative for acute process  - ID following, low suspicion for infection  - possibly polypharmacy? Although patient had been stable on pain medications when hallucinations and encephalopathy developed. Have wean down and discontinued scheduled dilaudid.  - gabapentin and trazodone discontinue  - tizanidine had been held due to AMS, will try resuming  - seroquel per Psych  - mentation improved    Nausea and vomiting  - chronic problem, but worsening symptoms today and yesterday  - Medicine following, appreciate assistance  - XR abd showing possible ileus.  CT AP negative for ileus  - GI consulted, appreciate assistance  - continue PRN nausea medications     HFrEF  - EF 35%  - discontinued lasix due to RODRICK, patient remains euvolemic on exam  - daily weights     RODRICK on CKD  - RODRICK due to sepsis, cardiac arrest. Required dialysis during acute stay  - Cr trending down. Previously been in 1-1.2 range  - stopped lasix  - fluids per Nephrology, appreciate assistance    Hyperkalemia, resolved  - suspect due to kidney dysfunction  - s/p lokemia 4/26  - Nephro following    Paroxysmal Atrial Fibrillation  - BB  - AC contraindicated due to bleeding risk    HTN  - lopressor 12.5 mg BID  - discontinued lisinopril     DM2 complicated by neuropathy  - home regimen lantus 75, prandial 15, SSI  - lantus 25 plus SSI  - discontinued 4 units prandial as patient has not been receiving   - glucose remains above goal, holding off on adjusting regimen due to worsening nausea and vomiting     Diabetic Neuropathy  - gabapentin discontinued     Acute blood loss anemia  - recent GIB s/p embolization of gastroduodenal artery on 2/25 and ex-lap  - monitor and transfuse for Hb<7     History of GIB  - PPI      Urinary Retention  - flomax  - ISC with high volumes, rea replaced  - will need follow up with Urology     Chronic Pain  - discontinued dilaudid due to AMS  - oxycodone PRN  - restart tizanidine PRN and monitor mental status      Bowel: Per protocol  Bladder: Per protocol  Sleep: Trazodone discontinued due to AMS  DVT PPx: heparin    Services/DME: SNF  EDOD: 5/6/22     Follow up appointments: PCP, Cardiology, wound care, Urology    Interdisciplinary team conference was held today with entire rehab treatment team including PT, OT, SLP, Dietician, RN, and SW. Discussion focused on progress toward rehab goals and discharge planning. Barriers: wound vac, level of assistance comorbidities. Total treatment time >35 min with greater than 50% spent in care coordination.       Electronically signed by Tamera Barros MD on 5/4/2022 at 2:11 PM

## 2022-05-04 NOTE — CONSULTS
[facility] Nurse  Follow-up Progress Note       NAME:  Nolberto Galan  MEDICAL RECORD NUMBER:  [id number]  AGE:  42 y.o. GENDER:  male  :  1975  TODAY'S DATE:  2022    Subjective: Wife \"I think he is going to sleep through this one\"   Wound Identification:  Wound Type:  traumatic left foot.  Healing stage 4 pressure injury coccyx/sacrum.   Contributing Factors:  edema, diabetes, chronic pressure, decreased mobility, shear force, obesity, incontinence of stool and incontinence of urine        Patient Goal of Care:  [x] Wound Healing  [] Odor Control   [] Palliative Care  [] Pain Control   [] Other:     Objective: Lying in bed eyes closed. Wife at bed side    BP (!) 181/96   Pulse 93   Temp 98.3 °F (36.8 °C) (Oral)   Resp 16   Ht 6' 0.5\" (1.842 m)   Wt 211 lb 11.2 oz (96 kg)   SpO2 98%   BMI 28.32 kg/m²   Shimon Risk Score: Shimon Scale Score: 14  Assessment:  Right and left buttocks improving, israel in and closing. Left dorsal foot also smaller today. wounds with smaller measurements.  Wife observed wound and pleased to see progression toward healing. See photo today.     Measurements:  Negative Pressure Wound Therapy Coccyx (Active)   $ Standard NPWT <=50 sq cm PER TX $ Yes 22 1718   $ Standard NPWT >50 sq cm PER TX $ Yes 22 1605   Wound Type Pressure ulcer: Stage IV;Surgical 22 1700   Unit Type [employer] [id number] 22 1700   Dressing Type Black Foam 22 1700   Number of pieces used 1 22 1700   Number of pieces removed 1 22 1700   Cycle Continuous 22 1700   Target Pressure (mmHg) 125 22 1700   Intensity 1 22 1700   Canister changed?  No 22 1700   Dressing Status New dressing applied 22 1700   Dressing Changed Changed/New 22 1700   Drainage Amount Small 22 1700   Drainage Description Serosanguinous 22 1700   Dressing Change Due 22 1700   Output (ml) 50 ml 04/22/22 1704   Wound Assessment Pale 05/04/22 1700   Shape heart shapes 05/04/22 1700   Odor None 05/04/22 1700   Number of days: 91       Wound 01/24/22 Foot Left;Dorsal I & D to left dorsal foot by Dr. Oriana Joseph, surgical wound (Active)   Number of days: 100       Wound 01/30/22 Sacrum SDTI (Active)   Wound Image   05/04/22 1700   Wound Etiology Pressure Stage 4 05/04/22 1700   Dressing Status New dressing applied 05/04/22 1700   Wound Cleansed Wound cleanser;Irrigated with saline 05/04/22 1700   Dressing/Treatment Barrier film;Hydrocolloid; Negative pressure wound therapy 05/04/22 1700   Offloading for Diabetic Foot Ulcers Offloading ordered 05/04/22 1700   Dressing Change Due 05/06/22 05/04/22 1700   Wound Length (cm) 7.5 cm 05/04/22 1700   Wound Width (cm) 5 cm 05/04/22 1700   Wound Depth (cm) 0.5 cm 05/04/22 1700   Wound Surface Area (cm^2) 37.5 cm^2 05/04/22 1700   Change in Wound Size % (l*w) -87.5 05/04/22 1700   Wound Volume (cm^3) 18.75 cm^3 05/04/22 1700   Wound Healing % -38 05/04/22 1700   Distance Tunneling (cm) 0 cm 05/04/22 1700   Tunneling Position ___ O'Clock 0 05/04/22 1700   Undermining Starts ___ O'Clock 0 05/04/22 1700   Undermining Ends___ O'Clock 0 05/04/22 1700   Undermining Maxium Distance (cm) 0 05/04/22 1700   Wound Assessment Pink/red 05/04/22 1700   Drainage Amount Small 05/04/22 1700   Drainage Description Serosanguinous 05/04/22 1700   Odor None 05/04/22 1700   Shanita-wound Assessment Blanchable erythema 05/04/22 1700   Margins Attached edges; Defined edges 05/04/22 1700   Wound Thickness Description not for Pressure Injury Full thickness 05/04/22 1700   Number of days: 94      Right and left buttocks:           Wound 04/29/22 Hand Left;Posterior (Active)   Wound Image   04/29/22 0933   Wound Etiology Burn 05/04/22 0824   Dressing Status Clean;Dry; Intact 05/04/22 0824   Wound Cleansed Not Cleansed 05/04/22 0824   Dressing/Treatment Dry dressing 05/04/22 0824 Offloading for Diabetic Foot Ulcers Offloading ordered 05/04/22 0824   Dressing Change Due 05/05/22 05/04/22 0824   Wound Length (cm) 3 cm 04/29/22 0933   Wound Width (cm) 3 cm 04/29/22 0933   Wound Surface Area (cm^2) 9 cm^2 04/29/22 0933   Distance Tunneling (cm) 0 cm 04/29/22 0933   Tunneling Position ___ O'Clock 0 04/29/22 0933   Undermining Starts ___ O'Clock 0 04/29/22 0933   Undermining Ends___ O'Clock 0 04/29/22 0933   Undermining Maxium Distance (cm) 0 04/29/22 0933   Wound Assessment Other (Comment) 05/02/22 1335   Drainage Amount None 05/04/22 0824   Shanita-wound Assessment Blanchable erythema 05/02/22 1335   Margins Attached edges 05/02/22 1335   Wound Thickness Description not for Pressure Injury Partial thickness 05/02/22 1335   Number of days: 5     Incision 03/08/22 Foot Anterior; Left (Active)   Wound Image   05/03/22 1616   Dressing Status New dressing applied 05/04/22 1700   Dressing Change Due 05/06/22 05/04/22 1700   Incision Cleansed Cleansed with saline 05/04/22 1700   Dressing/Treatment Alginate with Ag;Hydrocolloid; Tegaderm/transparent film dressing 05/04/22 1700   Incision Length (cm) 0.5 05/04/22 1700   Incision Width (cm) 2.5 cm 05/04/22 1700   Incision Depth (cm) 0.1 cm 05/04/22 1700   Margins Approximated 05/04/22 1700   Incision Assessment Other (Comment) 05/04/22 1700   Drainage Amount Small 05/04/22 1700   Drainage Description Serosanguinous 05/04/22 1700   Odor None 05/04/22 1700   Shanita-incision Assessment Blanchable erythema 05/04/22 1700   Number of days: 57       Response to treatment:  Well tolerated by patient. Pain Assessment:  Severity:  0 / 10  Quality of pain: N/A  Wound Pain Timing/Severity: none  Premedicated: No  Plan:   Plan of Care: Wound 01/30/22 Sacrum SDTI-Dressing/Treatment: Lorel Dom film,Hydrocolloid,Negative pressure wound therapy  [REMOVED] Wound 02/14/22 Head Left; Lower; Posterior Friction/pressure wound-unstageable.  4/14/22 healed now, will complete LDA per

## 2022-05-04 NOTE — CONSULTS
Gastroenterology Consult Note    Patient:   Maureen Carimchael   :    1975   Facility:     [address] [place] [address] [address] [address]   Referring/PCP: Garrett Briggs MD  Date:     2022  Consultant:   Johan Gómez DO    Subjective: This 42 y.o. male was admitted 2022 with a diagnosis of \"Critical illness myopathy [G72.81]\" and is seen in consultation regarding ileus    41 yo male with DM s/p right great toe amputation, obesity, neuropathy, CM, history of tobacco abuse who was admitted to [facility] with sepsis/RODRICK/MSSA bacteremia, respiratory failure, which progressed to cardiac arrest.  Patient has been having issues with constipation but passing gas. CT scan performed which did not demonstrate obstruction.   Patient complains of nausea    Past Medical History:   Diagnosis Date    Abscess of left foot 2022    Abscess of left hand     Acute encephalopathy     Acute hypoxemic respiratory failure (HCC)     Acute osteomyelitis of left hand ([address] 2022    Acute osteomyelitis of right hallux ([address] 2019    Cardiopulmonary arrest ([address]     Cellulitis of left foot 2020    CHF (congestive heart failure) ([address] 2014    presumably from viral myocarditis    Chronic osteomyelitis of left foot ([address] 10/27/2020    Closed displaced fracture of distal phalanx of right little finger 2021    Clostridium difficile infection 2016    Diabetic ulcer of left forefoot associated with type 2 diabetes mellitus, with necrosis of bone ([address] 2019    Diabetic ulcer of right great toe associated with type 2 diabetes mellitus, with necrosis of bone ([address] 2019    Enterococcal infection 2/3/2022    Failed soft 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 toe amputation site 10/26/2020    Hemodialysis patient [facility])     acute dialysis while in hospital. resolved    History of blood transfusion     History of hyperbaric oxygen therapy 10/28/2020    DFU- carmichael 3 - compromised flap    Hyperlipidemia     Hypertension     Infective tenosynovitis of extensor tendons of left hand 2/3/2022    Migraine     MSSA bacteremia 1/23/2022    Possible perforated tympanic membrane     as a result of recurrent otitis    Post-op hematoma of left foot 11/12/2020    Recurrent otitis media     Septic shock (HCC)     Staphylococcal arthritis of left foot ([address] 2/1/2022    Syncope     Tear of medial meniscus of left knee     Tobacco use     Toe osteomyelitis, left ([facility] 7/24/2020    VAP (ventilator-associated pneumonia) [facility])      Past Surgical History:   Procedure Laterality Date    ARM SURGERY Left 02/05/2022    LEFT HAND INCISION AND DRAINAGE performed by Perez Rodrigez MD at [address], ESOPHAGUS      bleeding ulcer correction    ENDOSCOPY, COLON, DIAGNOSTIC      FOOT DEBRIDEMENT Left 02/01/2022    ULCER DEBRIDEMENT LEFT FOOT performed by Stanton Garcia DPM at [address] Left 3/8/2022    ULCER DEBRIDEMENT LEFT FOOT performed by Stanton Garcia DPM at [address] Right 02/25/2022    successful coil embolization of the gastroduodenal artery    IR NONTUNNELED VASCULAR CATHETER  02/11/2022    IR NONTUNNELED VASCULAR CATHETER 2/11/2022 [facility]    KNEE ARTHROSCOPY Left 08/15/2013    LEFT KNEE ARTHROSCOPY , SYNOVECTOMY       LAPAROTOMY N/A 2/27/2022    PYLOROPLASTY, ULCER OVER-SEW, JEJUNOSTOMY TUBE INSERTION performed by Modesto Sosa MD at [address] [facility] [address] Left 07/24/2020    PARTIAL LEFT FOOT AMPUTATION    TOE AMPUTATION Right 08/23/2019    PARTIAL RIGHT FOOT AMPUTATION performed by Stanton Garcia DPM at [address] [facility] [address] Left 07/24/2020    PARTIAL LEFT FOOT AMPUTATION performed by Stanton Garcia DPM at [address] [facility] Left 10/22/2020    PARTIAL LEFT FOOT AMPUTATION WITH GRAFT APPLICATION performed by Stanton Garcia DPM at [address] TONSILLECTOMY      UPPER GASTROINTESTINAL ENDOSCOPY N/A 2022    EGD W/ANES.  performed by Michelle Madrid MD at [facility] N/A 2022    EGD DIAGNOSTIC ONLY performed by Kinsey Mchugh MD at [facility]         Social:   Social History     Tobacco Use    Smoking status: Former Smoker     Packs/day: 0.50     Years: 2.00     Pack years: 1.00     Quit date: 2005     Years since quittin.7    Smokeless tobacco: Former User     Quit date: 2005    Tobacco comment: SMOKED OCCASIONALLY UNTIL 8 YEARS AGO   Substance Use Topics    Alcohol use: Not Currently     Comment: RARELY     Family:   Family History   Problem Relation Age of Onset    Diabetes Mother     High Blood Pressure Mother     High Cholesterol Mother     Diabetes Father     High Blood Pressure Father     High Cholesterol Father     Stroke Father     High Blood Pressure Sister     High Blood Pressure Maternal Uncle     High Cholesterol Maternal Uncle     High Blood Pressure Maternal Grandmother        Scheduled Medications:    metoprolol succinate  50 mg Oral BID    magnesium oxide  400 mg Oral BID    QUEtiapine  100 mg Oral Nightly    QUEtiapine  25 mg Oral BID    mineral oil-hydrophilic petrolatum   Topical Daily    insulin lispro  0-18 Units SubCUTAneous TID WC    insulin lispro  0-9 Units SubCUTAneous Nightly    insulin glargine  25 Units SubCUTAneous Nightly    metoclopramide  10 mg Oral 4x Daily AC & HS    fluticasone  2 spray Each Nostril Nightly    heparin (porcine)  5,000 Units SubCUTAneous 3 times per day    pantoprazole  40 mg Oral BID    PARoxetine  20 mg Oral Daily    multivitamin  1 tablet Oral Daily    tamsulosin  0.4 mg Oral Nightly     Infusions:    sodium chloride 50 mL/hr at 22 1404    dextrose       PRN Medications: tiZANidine, promethazine, senna, haloperidol, QUEtiapine, oxyCODONE, oxyCODONE, prochlorperazine, ondansetron, ondansetron, hydrALAZINE, acetaminophen, diclofenac sodium, glucose, glucagon (rDNA), dextrose, bisacodyl, aluminum & magnesium hydroxide-simethicone, polyethylene glycol, dextrose bolus (hypoglycemia) **OR** dextrose bolus (hypoglycemia)  Allergies: Allergies   Allergen Reactions    Januvia [Sitagliptin] Nausea Only     Has taken metformin without side effects in the past.  Nausea with Janumet in the past.     Metformin And Related      GI Upset    Vancomycin      Pt had red face, swelling itching of eyelids, sore throat after receiving vancmomyin and cefepime. I think this was a histamine releasing reaction from vancomycin most likely. The cefepime was switched to Zosyn and patient had no reaction to Zosyn    Mustard [employer] Isothiocyanate] Swelling and Rash       ROS:   Review of Systems    Objective:     Physical Exam:   Vitals:    05/04/22 1113   BP:    Pulse:    Resp: 16   Temp:    SpO2:      I/O last 3 completed shifts: In: 5212.7 [P.O.:420;  I.V.:4792.7]  Out: 4651 [Urine:4650; Emesis/NG output:1]   General appearance: alert, appears stated age and cooperative  HEENT: PERRLA  Neck: no adenopathy, no carotid bruit, no JVD, supple, symmetrical, trachea midline and thyroid not enlarged, symmetric, no tenderness/mass/nodules  Lungs: clear to auscultation bilaterally  Heart: regular rate and rhythm, S1, S2 normal, no murmur, click, rub or gallop  Abdomen: soft, non-tender; bowel sounds normal; no masses,  no organomegaly  Extremities: extremities normal, atraumatic, no cyanosis or edema     Lab and Imaging Review   Labs:  CBC:   Recent Labs     05/02/22  0909   WBC 6.8   HGB 10.1*   HCT 28.5*   MCV 92.7        BMP:   Recent Labs     05/02/22  0909 05/03/22  0640 05/04/22  0659    140 139   K 3.8 3.8 3.7    103 103   CO2 23 24 24   BUN 17 16 14   CREATININE 1.3 1.3 1.2     LIVER PROFILE:   Recent Labs     05/02/22  0909   LIPASE 9.0*     PT/INR: No results for input(s): INR in the last 72 hours. Invalid input(s): PT    IMAGING:  CT ABDOMEN PELVIS WO CONTRAST Additional Contrast? Oral    Result Date: 5/3/2022  EXAMINATION: CT OF THE ABDOMEN AND PELVIS WITHOUT CONTRAST 5/3/2022 1:52 pm TECHNIQUE: CT of the abdomen and pelvis was performed without the administration of intravenous contrast. Multiplanar reformatted images are provided for review. Dose modulation, iterative reconstruction, and/or weight based adjustment of the mA/kV was utilized to reduce the radiation dose to as low as reasonably achievable. COMPARISON: None HISTORY: ORDERING SYSTEM PROVIDED HISTORY: possible ileus TECHNOLOGIST PROVIDED HISTORY: Reason for exam:->possible ileus Additional Contrast?->Oral Reason for Exam: abd pain, recent n/v FINDINGS: Lower Chest: Chronic changes identified at the lung bases left greater than right with trace effusion identified at the left lung base. Early infiltrate cannot be excluded. Organs: Liver is homogeneous in appearance. The spleen is unremarkable. The gallbladder has been surgically removed. Pancreas is homogeneous in appearance. Both adrenal glands are within normal limits. The kidneys are unremarkable with no evidence of obstruction or stones. There is no significant distension of the renal collecting systems. GI/Bowel: Stomach is unremarkable. Streak artifact and coils identified near the antrum of the stomach. There is no wall thickening of the small bowel. No mucosal abnormality. No evidence of significant distension. The colon is unremarkable. No wall thickening. Contrast seen within the colon. No signs of obstruction. No definite evidence of ileus. Pelvis: Pelvic organs reveal Munoz catheter balloon within the bladder. There is decompression with air. The prostate is unremarkable. Peritoneum/Retroperitoneum: No abdominal retroperitoneal lymphadenopathy.   No significant atherosclerotic disease of the abdominal aorta or iliac vessels. There is no pelvic adenopathy. Bones/Soft Tissues: Bony structures reveal no aggressive osseous abnormality. No ventral abdominal wall mass or defect. There is no evidence of obvious obstruction or evidence of ileus throughout the abdomen or pelvis. No acute intra-or pelvic abnormality. Hospital Problems           Last Modified POA    * (Principal) Critical illness myopathy 4/13/2022 Yes    Moderate malnutrition (HCC) (Chronic) 4/14/2022 Yes        Assessment:   43 yo male with nausea and constipation    Plan:   1. On bid ppi  2. Started on magnesium oxide bid for constipation, no ileus or obstruction on CT scan  3. Antiemetics as needed  4.  Please call with specific questions or concerns      Amber German DO  6:52 PM formerly [facility] [address]      [address] [facility]     Phone: [phone]     Fax: [phone]

## 2022-05-04 NOTE — CARE COORDINATION
CM sent referrals to [address] [place] [address] and [facility], awaiting review and response. Colletta Daub, RN     1200 CM received a telephone call from [facility] that they can not meet the patients needs and declined. Colletta Daub, RN    12 CM sent referral to [address] [place] [address] to pull from epic and review, awaiting response.  Colletta Daub, RN

## 2022-05-04 NOTE — PROGRESS NOTES
Hospitalist Progress Note      PCP: Maria Antonia Capone MD    Date of Admission: 4/13/2022    Chief Complaint: Encephalopathy    Hospital Course: 42 y.o. male with a PMH of Diabetes mellitus type 2 poorly controlled, CHF, history of diabetic ulcer with amputation of the right great toe, Obesity, Neuropathy, CM and history of tobacco abuse who was admitted to [facility] on 1/22/22 with Sepsis/RODRICK/MSSA bacteremia-from left foot infection, Resp failure which progressed to cardiac arrest. He was d/c'd to a LTACH on 3/10/2022. Admitted to ARU on 4/13/2022. Hospitalist consulted due mental status change. Patient reportedly easily agitated and hallucinating. Psych was consulted      Subjective:       Pt reports persistent nausea with an episode of vomiting today. Denies any abd pain.  Poor appetite            Medications:  Reviewed    Infusion Medications    sodium chloride 75 mL/hr at 05/04/22 3243    dextrose       Scheduled Medications    metoprolol succinate  50 mg Oral Daily    magnesium oxide  400 mg Oral BID    QUEtiapine  100 mg Oral Nightly    QUEtiapine  25 mg Oral BID    mineral oil-hydrophilic petrolatum   Topical Daily    insulin lispro  0-18 Units SubCUTAneous TID WC    insulin lispro  0-9 Units SubCUTAneous Nightly    insulin glargine  25 Units SubCUTAneous Nightly    metoclopramide  10 mg Oral 4x Daily AC & HS    fluticasone  2 spray Each Nostril Nightly    heparin (porcine)  5,000 Units SubCUTAneous 3 times per day    pantoprazole  40 mg Oral BID    PARoxetine  20 mg Oral Daily    multivitamin  1 tablet Oral Daily    tamsulosin  0.4 mg Oral Nightly     PRN Meds: tiZANidine, promethazine, senna, haloperidol, QUEtiapine, oxyCODONE, oxyCODONE, prochlorperazine, ondansetron, ondansetron, hydrALAZINE, acetaminophen, diclofenac sodium, glucose, glucagon (rDNA), dextrose, bisacodyl, aluminum & magnesium hydroxide-simethicone, polyethylene glycol, dextrose bolus (hypoglycemia) **OR** dextrose bolus (hypoglycemia)      Intake/Output Summary (Last 24 hours) at 5/4/2022 1200  Last data filed at 5/4/2022 1023  Gross per 24 hour   Intake 4024.74 ml   Output 4251 ml   Net -226.26 ml       Physical Exam Performed:    BP (!) 181/96   Pulse 93   Temp 98.3 °F (36.8 °C) (Oral)   Resp 16   Ht 6' 0.5\" (1.842 m)   Wt 211 lb 11.2 oz (96 kg)   SpO2 98%   BMI 28.32 kg/m²     General appearance: Obese. lethargic. No apparent distress, appears stated age  [de-identified]:  Normal cephalic, atraumatic without obvious deformity. Conjunctivae/corneas clear. Neck: Supple, with full range of motion. No jugular venous distention. Trachea midline. Respiratory:  Normal respiratory effort. Clear to auscultation, bilaterally without Rales/Wheezes/Rhonchi. Cardiovascular:  Regular rate and rhythm with normal S1/S2 without murmurs, rubs or gallops. Abdomen: Soft, non-tender, non-distended with normal bowel sounds. Musculoskeletal:  No clubbing, cyanosis or edema bilaterally. Skin: multiple areas of healing wounds on LE,left hand. sacral ulcer  Neurologic:  No focal deficits,  Psychiatric:  awake, not anxious appearing      Labs:   Recent Labs     05/02/22  0909   WBC 6.8   HGB 10.1*   HCT 28.5*        Recent Labs     05/02/22  0909 05/03/22  0640 05/04/22  0659    140 139   K 3.8 3.8 3.7    103 103   CO2 23 24 24   BUN 17 16 14   CREATININE 1.3 1.3 1.2   CALCIUM 10.5 10.4 10.0     No results for input(s): AST, ALT, BILIDIR, BILITOT, ALKPHOS in the last 72 hours. No results for input(s): INR in the last 72 hours. No results for input(s): Malcolm Carmen in the last 72 hours.     Urinalysis:      Lab Results   Component Value Date    NITRU Negative 04/27/2022    WBCUA 21-50 04/27/2022    BACTERIA Rare 01/22/2022    RBCUA 3-4 04/27/2022    BLOODU TRACE-INTACT 04/27/2022    SPECGRAV 1.010 04/27/2022    GLUCOSEU Negative 04/27/2022       Radiology:  CT ABDOMEN PELVIS WO CONTRAST Additional Contrast? Oral   Final Result There is no evidence of obvious obstruction or evidence of ileus throughout   the abdomen or pelvis. No acute intra-or pelvic abnormality. XR ABDOMEN (KUB) (SINGLE AP VIEW)   Final Result   Possible focal ileus on the left side of the abdomen         CT HEAD WO CONTRAST   Final Result   No acute intracranial abnormality. XR CHEST PORTABLE   Final Result   Low lung volumes, with subtle bilateral perihilar opacities concerning for   multifocal pneumonia. US RENAL COMPLETE   Final Result   Unremarkable ultrasound of the kidneys and urinary bladder. Assessment/Plan:    Active Hospital Problems    Diagnosis     Moderate malnutrition ([address] [E44.0]     Critical illness myopathy [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 No overt signs of infection- infection ruled out per ID. CT head non acute. Held neurontin, zanaflex, desyrel. Psych recs appreciated. Improving with seroquel- continue med. Mentation improved to baseline. Resolved           RODRICK, improving. Nephrology following     Multiple wounds: continue wound care      HTN-lisinopril held due to RODRICK. Not controlled. Mgt differed to nephro. Continue BB     PAF-now in SR, no AC d/t bleeding risk     MSSA bacteremia with left foot abscess, left hand abscess, osteomyelitis, RUL abscess  - finished course of abx     Anemia:  recent GIB s/p embolization of gastroduodenal artery on 2/25 and ex-lap. Monitor hgb and transfuse if <7    N/V :  AXR reviewed with possible focal ileus on left side of abdomen. Lipase was normal. CT abd/pelvis  non acute - no ileus or obstruction noted. GI consulted. Continue supportive care with antiemetics PRN, IVF. DMII: BG fairly controlled currently. Continue insulin            DVT Prophylaxis: heparin  Diet: ADULT DIET;  Regular; 4 carb choices (60 gm/meal)  ADULT ORAL NUTRITION SUPPLEMENT; Breakfast, Dinner, Lunch; Diabetic Oral Supplement  Code Status: Full Code    PT/OT Eval Status: ARU    Dispo -  Per primary team . Discussed plan of care with pt and RN     Leesa Blankenship MD

## 2022-05-04 NOTE — CARE COORDINATION
ARU Team Conference       Planned Discharge Date: 05/06:TBD with insurance  Durable medical equipment needed:Defer to SNF    Discharge Plan: CM spoke with patients wife at bedside with discharge plans. CM discussed therapy recommendations of additional skilled therapy at a SNF. CM discussed [facility] approved SNFs. Wife's choices are [facility], [facility], [facility]. CM acknowledged and will send referrals.  Bonita Louis RN

## 2022-05-04 NOTE — PROGRESS NOTES
Occupational Therapy  Facility/Department: [address] [place] [address]  Rehabilitation Occupational Therapy Daily Treatment Note    Date: 22  Patient Name: Brayan Dale       Room: 2190/1423-98  MRN: [id number]  Account: [de-identified]   : 1975  (41 y.o.) Gender: male                    Past Medical History:  has a past medical history of Abscess of left foot, Abscess of left hand, Acute encephalopathy, Acute hypoxemic respiratory failure ([address] Acute osteomyelitis of left hand ([address] Acute osteomyelitis of right hallux ([address] Cardiopulmonary arrest ([address] Cellulitis of left foot, CHF (congestive heart failure) ([address] Chronic osteomyelitis of left foot ([address] Closed displaced fracture of distal phalanx of right little finger, Clostridium difficile infection, Diabetic ulcer of left forefoot associated with type 2 diabetes mellitus, with necrosis of bone ([address] Diabetic ulcer of right great toe associated with type 2 diabetes mellitus, with necrosis of bone ([address] Enterococcal infection, 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 Past Surgical History:   has a past surgical history that includes Tonsillectomy; [place] tooth extraction; Knee arthroscopy (Left, 08/15/2013); Toe amputation (Right, 2019); other surgical history (Left, 2020); Toe amputation (Left, 2020); Toe amputation (Left, 10/22/2020); Foot Debridement (Left, 2022); Arm Surgery (Left, 2022);  IR NONTUNNELED VASCULAR CATHETER > 5 YEARS (02/11/2022); Upper gastrointestinal endoscopy (N/A, 02/17/2022); IR EMBOLIZATION HEMORRHAGE (Right, 02/25/2022); Upper gastrointestinal endoscopy (N/A, 2/27/2022); laparotomy (N/A, 2/27/2022); Foot Debridement (Left, 3/8/2022); Cholecystectomy; Dilatation, esophagus; and Endoscopy, colon, diagnostic. Restrictions  Restrictions/Precautions: General Precautions; Fall Risk  Other position/activity restrictions: RUE PICC, sacral wound vac    Subjective  Subjective: Pt in supine, agreeable to OT/PT cotx. 6/10 in buttocks, repositioned in bed with increased success. Restrictions/Precautions: General Precautions; Fall Risk             Objective     Cognition  Overall Cognitive Status: Exceptions  Arousal/Alertness: Delayed responses to stimuli  Following Commands: Follows one step commands with repetition; Follows multistep commands with repitition  Attention Span: Attends with cues to redirect  Memory: Decreased recall of precautions;Decreased short term memory  Safety Judgement: Decreased awareness of need for assistance;Decreased awareness of need for safety  Problem Solving: Assistance required to correct errors made;Assistance required to implement solutions;Assistance required to generate solutions;Assistance required to identify errors made  Insights: Decreased awareness of deficits  Initiation: Requires cues for some  Sequencing: Requires cues for some  Orientation  Overall Orientation Status: Within Functional Limits   Perception  Overall Perceptual Status: WFL     ADL  Feeding  Assistance Level: Increased time to complete;Set-up; Stand by assist;Supervision  Skilled Clinical Factors: sitting EOB and in [facility]  Grooming/Oral Hygiene  Assistance Level: Increased time to complete;Supervision;Set-up  Skilled Clinical Factors: sitting EOB and WC  Upper Extremity Dressing  Assistance Level: Increased time to complete;Verbal cues; Set-up; Stand by assist  Skilled Clinical Factors: sitting EOB with no Level: Moderate assistance; Requires x 2 assistance  Skilled Clinical Factors: modA x2 sit <> stand steady vs // bars  Bed To/From Chair  Technique: Sit pivot  Assistance Level: Requires x 2 assistance;Maximum assistance  Skilled Clinical Factors: modA X2 sit <> stand to steady from EOB <> WC         Assessment  Assessment  Assessment: Pt seen for OT/PT cotx to progress towards goals and assess functional transfer training/tolerance. Pt continues to demo decreased endurance, strength, tolerance to simple ADLs and unsupported sitting. Pt was able to complete multiple sit <> stand transfers to steady from EOB for pants management modA x2. Increased standing tolerance for ADL tasks and transfers. Pt was then able to tolerate x5 trials in // bars modA x2 with balance beam between BLE for CRYSTAL management. Pt was able to complete 2 steps fowards each LE before needing to sit, increased weight shifting L vs R in stance with mirror for midline/visual cues. Pts O2 >96% and HR in 90's throughout, BP maintained stable. Increased progress towards goals with increased cognition and command following. Cont to assess pending progress towards goals but pt still needing x2 A for all transfers therefore at this time pending DC date, SNF level of care would be appropriate to progress towards safer DC and independence with tranfsers/ADls. Cont OT POC. Second session: Pt on toilet upon arrival to room with PT, agreeable to OT. Requests to change clothing, wash up and brush teeth. Agreeable to complete transfer from toilet to EOB to supine to complete task. Pt was able to complete sit <> stand from [facility] over toilet Luisa x2 o steady, Luisa sit to supine. Once in supine, pt was able to complete all grooming and UE bathing tasks setup with increased time. Encouraged patient to weight shift in be for pressure relief. Encouraged patient to sit in recliner for dinner. Cont OT POC.     Activity Tolerance: Patient limited by fatigue;Patient limited by endurance; Patient limited by pain  Discharge Recommendations: Continue to assess pending progress; Patient would benefit from continued therapy after discharge;Subacute/Skilled Nursing Facility  OT Equipment Recommendations  Equipment Needed: Yes  Mobility Devices: ADL Assistive Devices  ADL Assistive Devices: Grab Bars - shower;Transfer Tub Bench  Other: CTA pending progress  Safety Devices  Safety Devices in place: Yes  Type of devices: Nurse notified;Call light within reach; Left in bed;Bed alarm in place; Patient at risk for falls; All fall risk precautions in place;Gait belt  Restraints  Initially in place: No    Patient Education  Education  Education Given To: Patient; Family  Education Provided: ADL Function;Role of Therapy;Plan of Care;Equipment;Home Exercise Program;Precautions; Safety; Energy Conservation; Family Education  Education Provided Comments: improtance of OOB activities, strengthening exercises for transfers/ADLs, pain management strategies, repositioning strategies  Education Method: Demonstration;Verbal;Teach Back  Barriers to Learning: None  Education Outcome: Verbalized understanding;Demonstrated understanding;Continued education needed    Plan  Plan  Times per Week: 5-7x per week  Times per Day: Daily  Current Treatment Recommendations: Strengthening;ROM;Balance training;Functional mobility training; Endurance training;Pain management; Safety education & training;Patient/Caregiver education & training;Positioning;Self-Care / ADL;Neuromuscular re-education; Wheelchair mobility training;Equipment evaluation, education, & procurement    Goals  Patient Goals   Patient goals : 1 week (4/21)-EXTEND TO 4/27  Short Term Goals  Time Frame for Short term goals: Pt will complete UB dressing with min A- GOAL MET; Pt SBA for UB dressing at EOB  Short Term Goal 1: Pt will complete LB dressing with mod A  Short Term Goal 2: Pt will complete LB bathing with mod A-GOAL MET 4/26  Short Term Goal 3: Pt will complete UB bathing with SBA-GOAL MET 4/26  Short Term Goal 4: Pt will complete 3 grooming task at w/c level-GOAL MET 4/26  Short Term Goal 5: Pt will complete x20 reps of BUE HEP to improve UB strength/endurance for repositioning and UB ADLs- GOAL MET 4/20;  Pt completing x20 BUE AROM  Additional Goals?: No  Long Term Goals  Time Frame for Long term goals : 3 weeks (5/05)  Long Term Goal 1: Pt will complete total body dressing with supv  Long Term Goal 2: Pt will complete total body bathing with supv  Long Term Goal 3: Pt will complete 3 grooming tasks at sink with mod I  Long Term Goal 4: Pt will perform functional transfers with mod I-DOWNGRADE TO MODA X1 due to inconsistent progress    Therapy Time   Individual Concurrent Group Co-treatment   Time In       0930   Time Out       1030   Minutes       60   Timed Code Treatment Minutes: 60 Minutes     Second Session Therapy Time:   Individual Concurrent Group Co-treatment   Time In [id number]   Time Out 4783     8021   Minutes 22     8     Timed Code Treatment Minutes:  30 Minutes    Total Treatment Minutes:  [address] Estrella Castrejon, OTR/L

## 2022-05-05 NOTE — PROGRESS NOTES
Nephrology Progress Note   [facility]      This patient is a 42year old male whom we are following for RODRICK. Subjective: The patient was seen and examined; he was resting comfortably with no acute events noted overnight. ROS: No fever or chills. Social: Family at bedside. Vitals:  BP (!) 168/86   Pulse 86   Temp 97.5 °F (36.4 °C) (Oral)   Resp 20   Ht 6' 0.5\" (1.842 m)   Wt 211 lb 11.2 oz (96 kg)   SpO2 98%   BMI 28.32 kg/m²   I/O last 3 completed shifts: In: 4276.7 [P.O.:840; I.V.:3436.7]  Out: 1 [Urine:5900; Emesis/NG output:1]  I/O this shift:  In: 120 [P.O.:120]  Out: -     Physical Exam:  Physical Exam  Vitals reviewed. Constitutional:       General: He is not in acute distress. HENT:      Head: Normocephalic and atraumatic. Eyes:      General: No scleral icterus. Conjunctiva/sclera: Conjunctivae normal.   Cardiovascular:      Rate and Rhythm: Normal rate. Heart sounds: No friction rub. Pulmonary:      Effort: Pulmonary effort is normal. No respiratory distress. Breath sounds: No wheezing. Abdominal:      Tenderness: There is abdominal tenderness. Comments: Hypoactive bowel sounds   Musculoskeletal:      Right lower leg: No edema. Left lower leg: No edema. Neurological:      Mental Status: He is alert.            Medications:   polyethylene glycol  17 g Oral Daily    NIFEdipine  30 mg Oral BID    metoprolol succinate  50 mg Oral BID    magnesium oxide  400 mg Oral BID    QUEtiapine  100 mg Oral Nightly    QUEtiapine  25 mg Oral BID    mineral oil-hydrophilic petrolatum   Topical Daily    insulin lispro  0-18 Units SubCUTAneous TID WC    insulin lispro  0-9 Units SubCUTAneous Nightly    insulin glargine  25 Units SubCUTAneous Nightly    metoclopramide  10 mg Oral 4x Daily AC & HS    fluticasone  2 spray Each Nostril Nightly    heparin (porcine)  5,000 Units SubCUTAneous 3 times per day    pantoprazole  40 mg Oral BID    PARoxetine  20 mg Oral Daily    multivitamin  1 tablet Oral Daily    tamsulosin  0.4 mg Oral Nightly         Labs:  Recent Labs     05/05/22  0801   WBC 8.5   HGB 8.3*   HCT 23.6*   MCV 91.1        Recent Labs     05/03/22  0640 05/04/22  0659 05/05/22  0801    139 137   K 3.8 3.7 3.7    103 102   CO2 24 24 24   GLUCOSE 126* 130* 139*   BUN 16 14 12   CREATININE 1.3 1.2 1.1   LABGLOM 60* >60 >60   GFRAA >60 >60 >60       Assessment/Plan:    Acute Kidney Injury. - possibly pre-renal, in setting of poor po intake, hypotension.  - H/O recent RODRICK requiring RRT through 3/1/22.  - now improving with IV fluids, monitor  - continue holding ACEi, lasix    Hyperkalemia  -stopped lisinopril  -improved      MSSA bacteremia with left foot abscess, left hand abscess, osteomyelitis, RUL abscess  - finished oral doxycycline 4/28     Multiple wounds   - wound vac to sacral wound and left foot wound  - wound care     HTN  - off lisinopril  - on metoprolol,   - started Nifedipine for better BP control     DM2   - insulin       Anemia  - recent GIB s/p embolization of gastroduodenal artery on 2/25 and ex-lap  - monitor and transfuse for Hb<7     H/O Atrial Fibrillation  - on  lopressor  - AC contraindicated due to bleeding risk     MS changes; not likely to be due to RODRICK  - Neurontin, Tizanidine and Trazodone on hold. - Improved.

## 2022-05-05 NOTE — PROGRESS NOTES
Occupational Therapy  Facility/Department: [facility]  Rehabilitation Occupational Therapy Daily Treatment Note    Date: 22  Patient Name: Mike Siddiqui       Room: 92/8833-34  MRN: [id number]  Account: [de-identified]   : 1975  (41 y.o.) Gender: male                    Past Medical History:  has a past medical history of Abscess of left foot, Abscess of left hand, Acute encephalopathy, Acute hypoxemic respiratory failure ([address] Acute osteomyelitis of left hand ([address] Acute osteomyelitis of right hallux ([address] Cardiopulmonary arrest ([address] Cellulitis of left foot, CHF (congestive heart failure) ([address] Chronic osteomyelitis of left foot ([address] Closed displaced fracture of distal phalanx of right little finger, Clostridium difficile infection, Diabetic ulcer of left forefoot associated with type 2 diabetes mellitus, with necrosis of bone ([address] Diabetic ulcer of right great toe associated with type 2 diabetes mellitus, with necrosis of bone ([address] Enterococcal infection, 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 Past Surgical History:   has a past surgical history that includes Tonsillectomy; [place] tooth extraction; Knee arthroscopy (Left, 08/15/2013); Toe amputation (Right, 2019); other surgical history (Left, 2020); Toe amputation (Left, 2020); Toe amputation (Left, 10/22/2020); Foot Debridement (Left, 2022); Arm Surgery (Left, 2022);  IR NONTUNNELED VASCULAR CATHETER > 5 YEARS (02/11/2022); Upper gastrointestinal endoscopy (N/A, 02/17/2022); IR EMBOLIZATION HEMORRHAGE (Right, 02/25/2022); Upper gastrointestinal endoscopy (N/A, 2/27/2022); laparotomy (N/A, 2/27/2022); Foot Debridement (Left, 3/8/2022); Cholecystectomy; Dilatation, esophagus; and Endoscopy, colon, diagnostic. Restrictions  Restrictions/Precautions: General Precautions; Fall Risk  Other position/activity restrictions: RUE PICC, sacral wound vac    Subjective  Subjective: Pt in supine, very nauseated, lethargic needing increased time for basic tasks. No hallucinations; PAIN: 7/10 unable to get comfortable in bed  Restrictions/Precautions: General Precautions; Fall Risk             Objective     Cognition  Overall Cognitive Status: Exceptions  Arousal/Alertness: Delayed responses to stimuli  Following Commands: Follows one step commands with repetition; Follows multistep commands with repitition  Attention Span: Attends with cues to redirect  Memory: Decreased recall of precautions;Decreased short term memory  Safety Judgement: Decreased awareness of need for assistance;Decreased awareness of need for safety  Problem Solving: Assistance required to correct errors made;Assistance required to implement solutions;Assistance required to generate solutions;Assistance required to identify errors made  Insights: Decreased awareness of deficits  Initiation: Requires cues for some  Sequencing: Requires cues for some  Orientation  Overall Orientation Status: Within Functional Limits         ADL  Feeding  Assistance Level: Increased time to complete;Set-up; Supervision  Skilled Clinical Factors: sitting upright in bed to take a drink of water  Grooming/Oral Hygiene  Assistance Level: Increased time to complete;Supervision;Set-up  Skilled Clinical Factors: sitting upright in bed to wash face/hands, brush hair and teeth with increased time  Upper Extremity Bathing  Assistance Level: Supervision;Set-up  Skilled Clinical Factors: sitting upright in bed to use bathing wipes with setup and increased time  Lower Extremity Bathing  Assistance Level: Increased time to complete;Minimal assistance  Skilled Clinical Factors: sitting upright in bed, used bathing wipes on front area setup and upper BLE, assist with bathing buttocks and B lower legs  Upper Extremity Dressing  Assistance Level: Increased time to complete;Verbal cues; Set-up; Stand by assist  Skilled Clinical Factors: sitting upright in bed, requesting to take off end of session  Putting On/Taking Off Footwear  Assistance Level: Dependent  Skilled Clinical Factors: depA for donning/doffing sock and shoes     Functional Mobility  Skilled Clinical Factors: unable to tolerate sitting upright in bed or rolling therefore unable to tolerate sitting EOB this date; increased nausea with movement and elevated BP  Bed Mobility  Overall Assistance Level: Minimal Assistance  Additional Factors: With handrails; Head of bed raised; Increased time to complete;Verbal cues  Bridging  Assistance Level: Minimal assistance  Skilled Clinical Factors: to manage pants  Roll Left  Assistance Level: Minimal assistance  Roll Right  Assistance Level: Minimal assistance  Skilled Clinical Factors: to manage pants  Scooting  Assistance Level: Minimal assistance; Requires x 2 assistance  Skilled Clinical Factors: cues for weight shifting L vs R and scooting to EOB   OT Exercises  A/AROM Exercises: x20 of the following exercises: AROM shoulder flexion, elbow flexion/ext, wrist flexion/ext, finger flexion/ext following along HEP     Assessment  Assessment  Assessment: Patient was able to tolerate sitting upright in bed to complete grooming, UE dressing and bathing tasks setup/SPV with increased time. Pt with increased nausea and pain as activities progressed needing increased time and rest breaks.   ModA for LE bathing due to need for bathing lower BLE/feet and buttocks, Luisa wiht increased time for rolling R vs L in bed with bed rail. Pt was able to angelo shirt setup with increased time, requesting to doff prior to PT arriving. Pt then unable to tolerate full cotx session, was able to roll R vs L for bed mobility Luisa-SPV but needing Luisa x2 for scooting to [facility]. Pt left in bed, missing 40 minutes this date due to medical complications, increased BP and nausea with limited movement and overall lethargy. MD made aware, therapy request a HOLD for sessions this date. Cont to rec SNF post DC to progress towards goals. Cont OT POC. Activity Tolerance: Patient limited by fatigue;Patient limited by endurance; Patient limited by pain  Discharge Recommendations: Patient would benefit from continued therapy after discharge;Subacute/Skilled Nursing Facility  OT Equipment Recommendations  Equipment Needed: Yes  Mobility Devices: ADL Assistive Devices  ADL Assistive Devices: Grab Bars - shower;Transfer Tub Bench  Other: CTA pending progress  Safety Devices  Safety Devices in place: Yes  Type of devices: Nurse notified;Call light within reach; Left in bed;Bed alarm in place; Patient at risk for falls; All fall risk precautions in place;Gait belt  Restraints  Initially in place: No    Patient Education  Education  Education Given To: Patient; Family  Education Provided: ADL Function;Role of Therapy;Plan of Care;Equipment;Home Exercise Program;Precautions; Safety; Energy Conservation; Family Education  Education Provided Comments: improtance of OOB activities, strengthening exercises for transfers/ADLs, pain management strategies, repositioning strategies  Education Method: Demonstration;Verbal;Teach Back  Barriers to Learning: None  Education Outcome: Verbalized understanding;Demonstrated understanding;Continued education needed    Plan  Plan  Times per Week: 5-7x per week  Times per Day: Daily  Current Treatment Recommendations: Strengthening;ROM;Balance training;Functional mobility training; Endurance training;Pain management; Safety education & training;Patient/Caregiver education & training;Positioning;Self-Care / ADL;Neuromuscular re-education; Wheelchair mobility training;Equipment evaluation, education, & procurement    Goals  Patient Goals   Patient goals : 1 week (4/21)-EXTEND TO 4/27  Short Term Goals  Time Frame for Short term goals: Pt will complete UB dressing with min A- GOAL MET; Pt SBA for UB dressing at EOB  Short Term Goal 1: Pt will complete LB dressing with mod A-NOT MET, maxA x1-2 5/5  Short Term Goal 2: Pt will complete LB bathing with mod A-GOAL MET 4/26  Short Term Goal 3: Pt will complete UB bathing with SBA-GOAL MET 4/26  Short Term Goal 4: Pt will complete 3 grooming task at w/c level-GOAL MET 4/26  Short Term Goal 5: Pt will complete x20 reps of BUE HEP to improve UB strength/endurance for repositioning and UB ADLs- GOAL MET 4/20;  Pt completing x20 BUE AROM  Additional Goals?: No  Long Term Goals  Time Frame for Long term goals : 3 weeks (5/05)  Long Term Goal 1: Pt will complete total body dressing with supv-GOAL PARTIALLY MET 5/5, setup/SPV UE, mod-maxA LE  Long Term Goal 2: Pt will complete total body bathing with supv-GOAL PARTIALLY MET 5/5, setup/SPV UE, mod-maxA LE  Long Term Goal 3: Pt will complete 3 grooming tasks at sink with mod I-GOAL PARTIALLY MET 5/5, setup/SPV sitting upright in bed  Long Term Goal 4: Pt will perform functional transfers with mod I-DOWNGRADE TO MODA X1 due to inconsistent progress  Additional Goals?: No    Therapy Time   Individual Concurrent Group Co-treatment   Time In 0900     0940   Time Out 0940     0950   Minutes 40     10   Timed Code Treatment Minutes: 50 Minutes  Variance: 40  Reason: Med hold    [employer], OTR/L

## 2022-05-05 NOTE — PROGRESS NOTES
Physical Therapy  Facility/Department: [facility]  Rehabilitation Physical Therapy Treatment Note    NAME: Stephania Louis  : 1975 (42 y.o.)  MRN: [id number]  CODE STATUS: Full Code    Date of Service: 22       Restrictions:  Restrictions/Precautions: General Precautions; Fall Risk  Position Activity Restriction  Other position/activity restrictions: RUE PICC, sacral wound vac     SUBJECTIVE  Subjective  Subjective: pt in bed, agreeable to minimal exercises only  Pain: pt reporting 7/10 in low back and hips dax           OBJECTIVE  Cognition  Overall Cognitive Status: Exceptions  Arousal/Alertness: Delayed responses to stimuli  Following Commands: Follows one step commands with repetition; Follows multistep commands with repitition  Attention Span: Attends with cues to redirect  Memory: Decreased recall of precautions;Decreased short term memory  Safety Judgement: Decreased awareness of need for assistance;Decreased awareness of need for safety  Problem Solving: Assistance required to correct errors made;Assistance required to implement solutions;Assistance required to generate solutions;Assistance required to identify errors made  Insights: Decreased awareness of deficits  Initiation: Requires cues for some  Sequencing: Requires cues for some  Orientation  Overall Orientation Status: Within Functional Limits    Functional Mobility  Roll Left  Assistance Level: Moderate assistance  Roll Right  Assistance Level: Moderate assistance  Scooting  Assistance Level: Maximum assistance; Requires x 2 assistance (max A x2)    Pt completed rolling to change briefs d/t pt incontinent of BM    Exercises:   -1x 15 BLE quad sets, supine abduction, and heel slides (support under each heel)      Education: Pt educated on HEP technique, importance of progressing ROM, strength, and mobility - demos understanding    ASSESSMENT/PROGRESS TOWARDS GOALS       Assessment  Assessment: Patient seen for LE exercises to progress LE strengthening. Patient completed rolling each direction with mod A to replace michel pad and found to be incontinent of BM. Pt's soiled brief changed. Pt scooted toward HOB with max A x2. Pt then completed supine exercises. Returned later for co-treat to reposition pt in bed. Pt declining further mobility this date, requested hold from MD.   Activity Tolerance: Patient limited by fatigue;Patient limited by endurance; Patient limited by pain  Discharge Recommendations: Subacute/Skilled Nursing Facility  PT Equipment Recommendations  Equipment Needed: No  Other: defer to SNF      Goals  Patient Goals   Patient goals : \"to walk again\"  Short Term Goals  Time Frame for Short term goals: 10 days- 4/22/22  Short term goal 1: Pt will complete bed mobility with min A; goal partially met 4/22 - pt completes supine>sit with min A and up to mod A x2 for sit>supine  Short term goal 2: Pt will complete functional transfers with max A x 1 using LRAD; not met 4/22 - pt requires up to max A x2 in // bars and herb stedy  Short term goal 3: Pt will propel manual w/c 50ft with min A; goal met 4/21 - pt propels manual w/c 75 ft with min A  Short term goal 4: Pt will tolerate gait assessment and appropriate LTG to be set; goal not met 4/22 - pt attempts to step but unable to complete successfully to attempt walking  Long Term Goals  Time Frame for Long term goals : 3 weeks- 5/4/22 - extended to 5/7 d/t longer than expected LOS  Long term goal 1: Pt will complete bed mobility with supervision; Long term goal 2: Pt will complete functional transfers with min A using LRAD; not met 5/2  Long term goal 3: Pt will propel manual w/c 150ft with mod I    PLAN OF CARE/SAFETY  Plan  Plan:  minutes of therapy at least 5 out of 7 days a week  Plan weeks: 3 weeks  Current Treatment Recommendations: Strengthening;Balance training;Functional mobility training;Transfer training; Endurance training;Gait training;Neuromuscular re-education;Home exercise program;Safety education & training;Patient/Caregiver education & training; Wheelchair mobility training;Equipment evaluation, education, & procurement;Positioning;Manual Therapy - Soft Tissue Mobilization; Therapeutic activities  Safety Devices  Type of Devices: All annemarie prominences offloaded; All fall risk precautions in place;Call light within reach; Patient at risk for falls;Nurse notified; Left in bed;Bed alarm in place  Restraints  Restraints Initially in Place: No      Therapy Time   Individual Concurrent Group Co-treatment   Time In 0800     0940   Time Out 0830     0950   Minutes 30     10         Total Timed minutes: 40 mins  Variance: 50   Med hold    Renetta So, PT, 05/05/22 at 3:51 PM

## 2022-05-05 NOTE — DISCHARGE INSTR - COC
Continuity of Care Form    Patient Name: Dreama Duverney   :  1975  MRN:  [id number]    Admit date:  2022  Discharge date:  2022    Code Status Order: Full Code   Advance Directives:      Admitting Physician:  Lorraine Bradshaw MD  PCP: Yonatan Lezama MD    Discharging Nurse: Shon Osborne Unit/Room#: 1230/0576-39  Discharging Unit Phone Number: [phone]    Emergency Contact:   Extended Emergency Contact Information  Primary Emergency Contact: Gretta GONZALES  Address: [address] Phone: [phone]  Mobile Phone: [phone]  Relation: Spouse  Secondary Emergency Contact: Rafael Pelaez Phone: [phone]  Relation: Parent    Past Surgical History:  Past Surgical History:   Procedure Laterality Date    ARM SURGERY Left 2022    LEFT HAND INCISION AND DRAINAGE performed by Omkar Ozuna MD at [address], ESOPHAGUS      bleeding ulcer correction    ENDOSCOPY, COLON, DIAGNOSTIC      FOOT DEBRIDEMENT Left 2022    ULCER DEBRIDEMENT LEFT FOOT performed by Rowena Marinelli DPM at [address] [facility] Left 3/8/2022    ULCER DEBRIDEMENT LEFT FOOT performed by Rowena Marinelli DPM at [facility] Right 2022    successful coil embolization of the gastroduodenal artery    IR NONTUNNELED VASCULAR CATHETER  2022    IR NONTUNNELED VASCULAR CATHETER [place] [facility] [place]    KNEE ARTHROSCOPY Left 08/15/2013    LEFT KNEE ARTHROSCOPY , SYNOVECTOMY       LAPAROTOMY N/A 2022    PYLOROPLASTY, ULCER OVER-SEW, JEJUNOSTOMY TUBE INSERTION performed by Lou Miller MD at [address] [address] Left 2020    PARTIAL LEFT FOOT AMPUTATION    TOE AMPUTATION Right 2019    PARTIAL RIGHT FOOT AMPUTATION performed by Rowena Marinelli DPM at [address] [place] [address] Left 2020 PARTIAL LEFT FOOT AMPUTATION performed by Thierry Ramos DPM at [address] Left 10/22/2020    PARTIAL LEFT FOOT AMPUTATION WITH GRAFT APPLICATION performed by Thierry Ramos DPM at [address] [facility] [address] 02/17/2022    EGD W/ANES.  performed by Bryce Aviles MD at [address] N/A 2/27/2022    EGD DIAGNOSTIC ONLY performed by Jacqui Covington MD at [address] EXTRACTION         Immunization History:   Immunization History   Administered Date(s) Administered    COVID-19, Pfizer Purple top, DILUTE for use, 12+ yrs, 30mcg/0.3mL dose 03/22/2021, 04/12/2021       Active Problems:  Patient Active Problem List   Diagnosis Code    Hypertension I10    Uncontrolled type 2 diabetes mellitus with diabetic polyneuropathy (HCC) E11.42, E11.65    Cardiomyopathy ([facility] I42.9    Mixed hyperlipidemia E78.2    Allergic rhinitis J30.9    Osteoarthritis M19.90    Acute osteomyelitis of left foot ([facility] M86.172    Acute renal failure (HCC) N17.9    Staph aureus infection A49.01    Third degree burn of left hand T23.302A    Antibiotic-associated diarrhea K52.1, T36.95XA    Pressure injury of deep tissue of sacral region L89.156    Severe protein-calorie malnutrition (HCC) E43    Paroxysmal atrial fibrillation (HCC) I48.0    Duodenal ulcer K26.9    Diabetic ulcer of left midfoot associated with type 2 diabetes mellitus, with necrosis of bone (HCC) E11.621, L97.424    Probable abscess of upper lobe of right lung without pneumonia (HCC) J85.2    Gastrointestinal hemorrhage K92.2    ABLA (acute blood loss anemia) D62    MSSA bacteremia R78.81, B95.61    Critical illness myopathy G72.81    Moderate malnutrition (HCC) E44.0       Isolation/Infection:   Isolation            No Isolation          Patient Infection Status       Infection Onset Added Last Indicated Last Indicated By Review Planned Expiration Resolved Resolved By    None active    Resolved    C-diff Rule Out 02/01/22 02/01/22 02/01/22 Clostridium difficile toxin/antigen (Ordered)   02/01/22 Rule-Out Test Resulted    COVID-19 (Rule Out) 10/20/20 10/20/20 10/20/20 COVID-19 (Ordered)   10/20/20 Rule-Out Test Resulted            Nurse Assessment:  Last Vital Signs: BP (!) 168/86   Pulse 86   Temp 97.5 °F (36.4 °C) (Oral)   Resp 20   Ht 6' 0.5\" (1.842 m)   Wt 211 lb 11.2 oz (96 kg)   SpO2 98%   BMI 28.32 kg/m²     Last documented pain score (0-10 scale): Pain Level: 7  Last Weight:   Wt Readings from Last 1 Encounters:   04/26/22 211 lb 11.2 oz (96 kg)     Mental Status:  oriented, alert, logical, and thought processes intact    IV Access:  - None    Nursing Mobility/ADLs:  Walking   Dependent  Transfer  Dependent  Bathing  Dependent  Dressing  Dependent  Toileting  Dependent  Feeding  Independent  Med Admin  Assisted  Med Delivery   whole    Wound Care Documentation and Therapy:  Negative Pressure Wound Therapy Coccyx (Active)   $ Standard NPWT <=50 sq cm PER TX $ Yes 05/04/22 1718   $ Standard NPWT >50 sq cm PER TX $ Yes 04/27/22 1605   Wound Type Pressure ulcer: Stage IV;Surgical 05/05/22 0800   Unit Type [employer] [facility] [address] 05/04/22 1700   Dressing Type Black Foam 05/05/22 0800   Number of pieces used 1 05/04/22 1700   Number of pieces removed 1 05/04/22 1700   Cycle Continuous 05/05/22 0800   Target Pressure (mmHg) 125 05/05/22 0800   Intensity 1 05/04/22 1700   Canister changed?  No 05/04/22 1700   Dressing Status New dressing applied 05/04/22 1700   Dressing Changed Changed/New 05/04/22 1700   Drainage Amount Small 05/04/22 1700   Drainage Description Serosanguinous 05/04/22 1700   Dressing Change Due 05/06/22 05/04/22 1700   Output (ml) 50 ml 04/22/22 1704   Wound Assessment Pale 05/04/22 1700   Shape heart shapes 05/04/22 1700   Odor None 05/04/22 1700   Number of days: 92       Wound 01/24/22 Foot Left;Dorsal I & D to left dorsal foot by Dr. Alan Drake, surgical wound (Active)   Number of days: 101       Wound 01/30/22 Sacrum SDTI (Active)   Wound Image   05/04/22 1700   Wound Etiology Pressure Stage 4 05/05/22 0800   Dressing Status Clean;Dry; Intact 05/05/22 0800   Wound Cleansed Wound cleanser;Irrigated with saline 05/04/22 1700   Dressing/Treatment Negative pressure wound therapy 05/05/22 0800   Offloading for Diabetic Foot Ulcers Offloading ordered 05/04/22 1700   Dressing Change Due 05/04/22 05/05/22 0800   Wound Length (cm) 7.5 cm 05/04/22 1700   Wound Width (cm) 5 cm 05/04/22 1700   Wound Depth (cm) 0.5 cm 05/04/22 1700   Wound Surface Area (cm^2) 37.5 cm^2 05/04/22 1700   Change in Wound Size % (l*w) -87.5 05/04/22 1700   Wound Volume (cm^3) 18.75 cm^3 05/04/22 1700   Wound Healing % -38 05/04/22 1700   Distance Tunneling (cm) 0 cm 05/04/22 1700   Tunneling Position ___ O'Clock 0 05/04/22 1700   Undermining Starts ___ O'Clock 0 05/04/22 1700   Undermining Ends___ O'Clock 0 05/04/22 1700   Undermining Maxium Distance (cm) 0 05/04/22 1700   Wound Assessment Pink/red 05/04/22 1700   Drainage Amount Small 05/04/22 1700   Drainage Description Serosanguinous 05/04/22 1700   Odor None 05/04/22 1700   Shanita-wound Assessment Blanchable erythema 05/04/22 1700   Margins Attached edges; Defined edges 05/04/22 1700   Wound Thickness Description not for Pressure Injury Full thickness 05/04/22 1700   Number of days: 95       Wound 04/29/22 Hand Left;Posterior (Active)   Wound Image   04/29/22 0933   Wound Etiology Other 05/05/22 0800   Dressing Status Clean;Dry; Intact 05/05/22 0800   Wound Cleansed Not Cleansed 05/04/22 0824   Dressing/Treatment Dry dressing 05/05/22 0800   Offloading for Diabetic Foot Ulcers Offloading ordered 05/04/22 0824   Dressing Change Due 05/05/22 05/04/22 0824   Wound Length (cm) 3 cm 04/29/22 0933   Wound Width (cm) 3 cm 04/29/22 0933   Wound Surface Area (cm^2) 9 cm^2 04/29/22 0933   Distance Tunneling (cm) 0 cm 04/29/22 0933   Tunneling Position ___ O'Clock 0 04/29/22 0933   Undermining Starts ___ O'Clock 0 04/29/22 0933   Undermining Ends___ O'Clock 0 04/29/22 0933   Undermining Maxium Distance (cm) 0 04/29/22 0933   Wound Assessment Other (Comment) 05/02/22 1335   Drainage Amount None 05/04/22 0824   Shanita-wound Assessment Blanchable erythema 05/02/22 1335   Margins Attached edges 05/02/22 1335   Wound Thickness Description not for Pressure Injury Partial thickness 05/02/22 1335   Number of days: 6       Incision 03/08/22 Foot Anterior; Left (Active)   Wound Image   05/03/22 1616   Dressing Status Clean;Dry; Intact 05/05/22 0800   Dressing Change Due 05/04/22 05/05/22 0800   Incision Cleansed Cleansed with saline 05/04/22 1700   Dressing/Treatment Barrier film; Moist to moist;Dry dressing 05/05/22 0800   Incision Length (cm) 0.5 05/04/22 1700   Incision Width (cm) 2.5 cm 05/04/22 1700   Incision Depth (cm) 0.1 cm 05/04/22 1700   Margins Approximated 05/04/22 1700   Incision Assessment Other (Comment) 05/04/22 1700   Drainage Amount Small 05/04/22 1700   Drainage Description Serosanguinous 05/04/22 1700   Odor None 05/04/22 1700   Shanita-incision Assessment Blanchable erythema 05/04/22 1700   Number of days: 58        Elimination:  Continence: Bowel: No  Bladder: URINARY RETENTION  Urinary Catheter: Insertion Date: 5/17/22 and Indication for Use of Catheter: Acute urinary retention/obstruction   Colostomy/Ileostomy/Ileal Conduit: No       Date of Last BM: 5/17/22    Intake/Output Summary (Last 24 hours) at 5/5/2022 1652  Last data filed at 5/5/2022 1433  Gross per 24 hour   Intake 600 ml   Output 3725 ml   Net -3125 ml     I/O last 3 completed shifts: In: 4276.7 [P.O.:840; I.V.:3436.7]  Out: 1 [Urine:5900; Emesis/NG output:1]    Safety Concerns: At Risk for Falls    Impairments/Disabilities:      Vision    Nutrition Therapy:  Current Nutrition Therapy:   - Oral Diet:  Carb Control 4 carbs/meal (1800kcals/day)    Routes of Feeding: Oral  Liquids:  Thin Liquids  Daily Fluid Restriction: no  Last Modified Barium Swallow with Video (Video Swallowing Test): not done    Treatments at the Time of Hospital Discharge:   Respiratory Treatments:   Oxygen Therapy:  is not on home oxygen therapy. Ventilator:    - No ventilator support    Rehab Therapies: Physical Therapy and Occupational Therapy AND SPEECH  Weight Bearing Status/Restrictions: No weight bearing restrictions  Other Medical Equipment (for information only, NOT a DME order): Other Treatments: WOUND CARE    Patient's personal belongings (please select all that are sent with patient):  None    RN SIGNATURE:  Electronically signed by Clement Allen RN on 5/17/22 at 12:15 PM EDT    CASE MANAGEMENT/SOCIAL WORK SECTION    Inpatient Status Date: 04/13/2022    Readmission Risk Assessment Score:  Readmission Risk              Risk of Unplanned Readmission:  28       Discharging to Facility/ [address] [place] [address] 36.   [address] [id number]    / signature: Electronically signed by Mohini Lockhart RN on 5/17/22 at 10:00 AM EDT    PHYSICIAN SECTION    Prognosis: Good    Condition at Discharge: Stable    Rehab Potential (if transferring to Rehab): Good    Recommended Follow-up, Labs or Other Treatments After Discharge:    follow up with providers    Wound Care Recommendation;  Dressing change daily: left foot, & sacrum:      **Cleanse Left foot with normal saline, pat dry, then apply dry guaze with roll guaze covering daily. **Cleanse Sacrum with normal saline, pat dry, then,apply Alginate AG with foam dressing to cover daily. **Left hand posterior hand, cleanse with normal saline, pat dry, apply hydrocolloid over old burn area of 5th knuckle and pink patricia wound. Change every 3 days or as needed if dislodges.                Physician Certification: I certify the above information and transfer of Stephania Louis  is necessary for the continuing treatment of the diagnosis listed and that he requires [place] for greater 30 days.      Update Admission H&P: No change in H&P    PHYSICIAN SIGNATURE:  Electronically signed by Kaylee Rebolledo MD on 5/16/22 at 3:19 PM EDT

## 2022-05-05 NOTE — PROGRESS NOTES
Justinjameson López  5/5/2022  [id number]    Chief Complaint: Critical illness myopathy    Subjective:   No acute events overnight. Today Hawk is seen in his room. He has worsening nausea and vomiting. He attempted to work with therapy this morning, but was too ill to participate. He has not been eating well. GI consulted and medicine following patient. ROS: denies f/c, cp, sob    Objective:  Patient Vitals for the past 24 hrs:   BP Temp Temp src Pulse Resp SpO2   05/05/22 0956 (!) 181/105 -- -- 95 -- 98 %   05/05/22 0800 (!) 179/101 97.5 °F (36.4 °C) Oral 84 20 99 %   05/04/22 2155 (!) 147/81 97.5 °F (36.4 °C) Oral 77 16 99 %   05/04/22 1113 -- -- -- -- 16 --     Gen: Alert, appears ill, supine in bed  HEENT: Normocephalic, atraumatic  CV: extremities well perfused  Resp: No respiratory distress.    Abd: Soft, nontender, nondistended  Ext: No edema  Skin: [place] to sacrum and L foot with appropriate seal   Neuro: Alert, oriented, speech fluent without aphasia  Psych: appropriate mood and affect    Wt Readings from Last 3 Encounters:   04/26/22 211 lb 11.2 oz (96 kg)   03/10/22 255 lb 1.6 oz (115.7 kg)   10/14/21 282 lb (127.9 kg)       Laboratory data:   Lab Results   Component Value Date    WBC 8.5 05/05/2022    HGB 8.3 (L) 05/05/2022    HCT 23.6 (L) 05/05/2022    MCV 91.1 05/05/2022     05/05/2022       Lab Results   Component Value Date     05/05/2022    K 3.7 05/05/2022     05/05/2022    CO2 24 05/05/2022    BUN 12 05/05/2022    CREATININE 1.1 05/05/2022    GLUCOSE 139 05/05/2022    CALCIUM 10.5 05/05/2022        Therapy progress:  PT  Position Activity Restriction  Other position/activity restrictions: RUE PICC, sacral wound vac  Objective     Sit to Stand: Maximum Assistance,Dependent/Total,2 Person Assistance  Stand to sit: Maximum Assistance,Dependent/Total,2 Person Assistance  Bed to Chair: Dependent/Total     OT  PT Equipment Recommendations  Equipment Needed: No  Mobility Devices: [address] [facility]  Bed : Electric - Full (Head of Bed),Bed Magnus More  Other: defer to SNF     Assessment        SLP  Current Diet : Regular  Current Liquid Diet : Thin  Diet Solids Recommendation: Regular  Liquid Consistency Recommendation: Thin    Body mass index is 28.32 kg/m². Assessment and Plan:  Mehran Tate is a 42year old male with a past medical history significant for DM2, diabetic foot ulcer with multiple amputations, HFrEF who has had a prolonged hospital course following Covid pneumonia with complications including respiratory failure, GIB, and multiple infections. He was admitted to [facility] on 4/13/22 due to functional deficits below his baseline.      Critical Illness Myopathy  - due to covid pneumonia  - PT, OT, ST     MSSA bactermia  - with left foot abscess, left hand abscess, osteomyelitis, RUL abscess  - transitioned to oral doxycycline, continued through 4/28     Multiple wounds POA  - wound vac to sacral wound and left foot wound  - wound care    Encephalopathy, improved  - etiology unclear. Workup not revealing of infectious, metabolic or intracranial etiology. - wbc, ammonia, electrolytes, lactic acid, and ABG within normal limits  - CT head negative for acute process  - ID following, low suspicion for infection  - possibly polypharmacy? Although patient had been stable on pain medications when hallucinations and encephalopathy developed. Have wean down and discontinued scheduled dilaudid.  - gabapentin and trazodone discontinue  - tizanidine had been held due to AMS, will try resuming  - seroquel per Psych  - mentation improved    Nausea and vomiting  - chronic problem, but worsening symptoms today and yesterday  - Medicine following, appreciate assistance  - XR abd showing possible ileus.  CT AP negative for ileus  - GI consulted, appreciate assistance, started mag ox BID  - continue PRN nausea medications  - reached out to medicine about further management     HFrEF  - EF 35%  - discontinued lasix due to RODRICK, patient remains euvolemic on exam  - daily weights     RODRICK on CKD  - RODRICK due to sepsis, cardiac arrest. Required dialysis during acute stay  - Cr trending down.  Previously been in 1-1.2 range  - stopped lasix  - fluids per Nephrology, appreciate assistance    Hyperkalemia, resolved  - suspect due to kidney dysfunction  - s/p lokemia 4/26  - Nephro following    Paroxysmal Atrial Fibrillation  - BB  - AC contraindicated due to bleeding risk    HTN  - lopressor 12.5 mg BID  - discontinued lisinopril     DM2 complicated by neuropathy  - home regimen lantus 75, prandial 15, SSI  - lantus 25 plus SSI  - discontinued 4 units prandial as patient has not been receiving      Diabetic Neuropathy  - gabapentin discontinued     Acute blood loss anemia  - recent GIB s/p embolization of gastroduodenal artery on 2/25 and ex-lap  - monitor and transfuse for Hb<7     History of GIB  - PPI      Urinary Retention  - flomax  - ISC with high volumes, rea replaced  - will need follow up with Urology     Chronic Pain  - discontinued dilaudid due to AMS  - oxycodone PRN  - restarted tizanidine PRN and monitoring mental status      Bowel: Per protocol  Bladder: Per protocol  Sleep: Trazodone discontinued due to AMS  DVT PPx: heparin    Services/DME: SNF  EDOD: 5/6/22     Follow up appointments: PCP, Cardiology, wound care, Urology      Electronically signed by Deepali Alejo MD on 5/5/2022 at 10:50 AM

## 2022-05-05 NOTE — PROGRESS NOTES
Hospitalist Progress Note      PCP: Caty Neely MD    Date of Admission: 4/13/2022    Chief Complaint: Encephalopathy    Hospital Course: 42 y.o. male with a PMH of Diabetes mellitus type 2 poorly controlled, CHF, history of diabetic ulcer with amputation of the right great toe, Obesity, Neuropathy, CM and history of tobacco abuse who was admitted to [facility] on 1/22/22 with Sepsis/RODRICK/MSSA bacteremia-from left foot infection, Resp failure which progressed to cardiac arrest. He was d/c'd to a LTACH on 3/10/2022. Admitted to ARU on 4/13/2022. Hospitalist consulted due mental status change. Patient reportedly easily agitated and hallucinating. Psych was consulted      Subjective:       Pt states he had small non bloody BM yesterday but feels constipated today. Requesting enema.  Has intermittent nausea with vomiting           Medications:  Reviewed    Infusion Medications    sodium chloride 50 mL/hr at 05/04/22 1404    dextrose       Scheduled Medications    metoprolol succinate  50 mg Oral BID    magnesium oxide  400 mg Oral BID    QUEtiapine  100 mg Oral Nightly    QUEtiapine  25 mg Oral BID    mineral oil-hydrophilic petrolatum   Topical Daily    insulin lispro  0-18 Units SubCUTAneous TID WC    insulin lispro  0-9 Units SubCUTAneous Nightly    insulin glargine  25 Units SubCUTAneous Nightly    metoclopramide  10 mg Oral 4x Daily AC & HS    fluticasone  2 spray Each Nostril Nightly    heparin (porcine)  5,000 Units SubCUTAneous 3 times per day    pantoprazole  40 mg Oral BID    PARoxetine  20 mg Oral Daily    multivitamin  1 tablet Oral Daily    tamsulosin  0.4 mg Oral Nightly     PRN Meds: tiZANidine, promethazine, senna, haloperidol, QUEtiapine, oxyCODONE, oxyCODONE, prochlorperazine, ondansetron, ondansetron, hydrALAZINE, acetaminophen, diclofenac sodium, glucose, glucagon (rDNA), dextrose, bisacodyl, aluminum & magnesium hydroxide-simethicone, polyethylene glycol, dextrose bolus (hypoglycemia) **OR** dextrose bolus (hypoglycemia)      Intake/Output Summary (Last 24 hours) at 5/5/2022 1109  Last data filed at 5/5/2022 1053  Gross per 24 hour   Intake 840 ml   Output 2550 ml   Net -1710 ml       Physical Exam Performed:    BP (!) 181/105   Pulse 95   Temp 97.5 °F (36.4 °C) (Oral)   Resp 20   Ht 6' 0.5\" (1.842 m)   Wt 211 lb 11.2 oz (96 kg)   SpO2 98%   BMI 28.32 kg/m²     General appearance: Obese. lethargic. No apparent distress, appears stated age  [de-identified]:  Normal cephalic, atraumatic without obvious deformity. Conjunctivae/corneas clear. Neck: Supple, with full range of motion. No jugular venous distention. Trachea midline. Respiratory:  Normal respiratory effort. Clear to auscultation, bilaterally without Rales/Wheezes/Rhonchi. Cardiovascular:  Regular rate and rhythm with normal S1/S2 without murmurs, rubs or gallops. Abdomen: Soft, non-tender, non-distended with normal bowel sounds. Musculoskeletal:  No clubbing, cyanosis or edema bilaterally. Skin:  warm and dry   Neurologic:  No overt  focal deficits,  Psychiatric:  awake, not anxious appearing      Labs:   Recent Labs     05/05/22  0801   WBC 8.5   HGB 8.3*   HCT 23.6*        Recent Labs     05/03/22  0640 05/04/22  0659 05/05/22  0801    139 137   K 3.8 3.7 3.7    103 102   CO2 24 24 24   BUN 16 14 12   CREATININE 1.3 1.2 1.1   CALCIUM 10.4 10.0 10.5     No results for input(s): AST, ALT, BILIDIR, BILITOT, ALKPHOS in the last 72 hours. No results for input(s): INR in the last 72 hours. No results for input(s): Laura Blas in the last 72 hours.     Urinalysis:      Lab Results   Component Value Date    NITRU Negative 04/27/2022    WBCUA 21-50 04/27/2022    BACTERIA Rare 01/22/2022    RBCUA 3-4 04/27/2022    BLOODU TRACE-INTACT 04/27/2022    SPECGRAV 1.010 04/27/2022    GLUCOSEU Negative 04/27/2022       Radiology:  CT ABDOMEN PELVIS WO CONTRAST Additional Contrast? Oral   Final Result   There is fairly controlled currently. Continue insulin            DVT Prophylaxis: heparin  Diet: ADULT DIET;  Regular; 4 carb choices (60 gm/meal)  ADULT ORAL NUTRITION SUPPLEMENT; Breakfast, Dinner, Lunch; Diabetic Oral Supplement  Code Status: Full Code    PT/OT Eval Status: ARU    Dispo -  Per primary team.   Discussed with RN, pt. Message sent to Dr Carmen Montero MD

## 2022-05-06 NOTE — PROGRESS NOTES
Assumed care of patient from [facility] at this time. Agree with previous assessment. Patient resting comfortably, mother at beside. Pt and family deny current needs at this time. Call light within reach. Will continue to monitor.

## 2022-05-06 NOTE — PROGRESS NOTES
Comprehensive Nutrition Assessment    Type and Reason for Visit:  Reassess    Nutrition Recommendations/Plan:   1. Modify diet to CC5 diet and encourage PO intake   2. Continue glucerna TID and add magic cups BID   3. Encourage intake of protein rich foods to promote wound healing. 4. Monitor nutrition adequacy, pertinent labs, bowel habits, wt changes, and clinical progress     Malnutrition Assessment:  Malnutrition Status: Moderate malnutrition (04/14/22 1528)    Context:  Chronic Illness     Findings of the 6 clinical characteristics of malnutrition:  Energy Intake:  7 - 75% or less estimated energy requirements for 1 month or longer  Weight Loss:  7 - 7.5% over 3 months       Nutrition Assessment:    Follow up: Pt nutritionally compromised AEB poor PO intakes over the past few days d/t N/V. CT AP negative for ileus. Po intakes mostly 0-50% of meals on CC4 diet, drinking 1-2 glucerna per day. RD to modify diet to CC5. Pt was able to eat breakfast today but felt nauseous at lunch time. Pt reports he \"eats when he can. \" Wt stable since admission. Will continue glucerna TID, RD to add magic cups BID for additional protein and calories to promote wound healing. Continue to encourage PO intake, will continue to monitor. Nutrition Related Findings:    BG WNL. N/V, reglan started. + 2 BM 5/5. Wound Type: Stage IV,Pressure Injury,Surgical Incision,Burns,Wound Vac       Current Nutrition Intake & Therapies:    Average Meal Intake: 0%,1-25%  Average Supplements Intake: %,1-25%  ADULT ORAL NUTRITION SUPPLEMENT; Breakfast, Dinner, Lunch; Diabetic Oral Supplement  ADULT DIET; Regular; 5 carb choices (75 gm/meal)  ADULT ORAL NUTRITION SUPPLEMENT; Lunch, Dinner; Frozen Oral Supplement    Anthropometric Measures:  Height: 6' 0.5\" (184.2 cm)  Ideal Body Weight (IBW): 181 lbs (82 kg)       Current Body Weight: 211 lb 11.2 oz (96 kg),   IBW.  Weight Source: Bed Scale  Current BMI (kg/m2): 28.3 BMI Categories: Overweight (BMI 25.0-29. 9)    Estimated Daily Nutrient Needs:  Energy Requirements Based On: Kcal/kg  Weight Used for Energy Requirements: Ideal (80.9 kg)  Energy (kcal/day): 4395-9180  Weight Used for Protein Requirements: Ideal  Protein (g/day): 80-97  Method Used for Fluid Requirements: 1 ml/kcal    Nutrition Diagnosis:   · Moderate malnutrition related to inadequate protein-energy intake,pain as evidenced by Criteria as identified in malnutrition assessment    Nutrition Interventions:   Food and/or Nutrient Delivery: Continue Current Diet,Continue Oral Nutrition Supplement,Start Oral Nutrition Supplement  Nutrition Education/Counseling: Education declined  Coordination of Nutrition Care: Continue to monitor while inpatient,Interdisciplinary Rounds  Plan of Care discussed with: Patient    Goals:  Previous Goal Met: Progress towards Goal(s) Declining  Goals: Meet at least 75% of estimated needs,prior to discharge       Nutrition Monitoring and Evaluation:   Behavioral-Environmental Outcomes: None Identified  Food/Nutrient Intake Outcomes: Food and Nutrient Intake,Supplement Intake  Physical Signs/Symptoms Outcomes: Biochemical Data,Nutrition Focused Physical Findings,Weight,Skin,Diarrhea    Discharge Planning:    Continue current diet,Continue Oral Nutrition Supplement     Awa Zhang [address], [address] [place] [address],   Contact: Office: [phone]; [address]: [id number]

## 2022-05-06 NOTE — PROGRESS NOTES
Nephrology Progress Note   [facility]      This patient is a 42year old male whom we are following for RODRICK. Subjective: The patient was seen and examined; he was resting comfortably with no acute events noted overnight. ROS: No fever or chills. Social: Family at bedside. Vitals:  /69   Pulse 77   Temp 97.4 °F (36.3 °C) (Oral)   Resp 18   Ht 6' 0.5\" (1.842 m)   Wt 230 lb (104.3 kg)   SpO2 97%   BMI 30.76 kg/m²   I/O last 3 completed shifts: In: 5 [P.O.:720]  Out: 3750 [[id number]]  I/O this shift:  In: 240 [P.O.:240]  Out: -     Physical Exam:  Physical Exam  Vitals reviewed. Constitutional:       General: He is not in acute distress. HENT:      Head: Normocephalic and atraumatic. Eyes:      General: No scleral icterus. Conjunctiva/sclera: Conjunctivae normal.   Cardiovascular:      Rate and Rhythm: Normal rate. Heart sounds: No friction rub. Pulmonary:      Effort: Pulmonary effort is normal. No respiratory distress. Breath sounds: No wheezing. Abdominal:      Tenderness: There is abdominal tenderness. Comments: Hypoactive bowel sounds   Musculoskeletal:      Right lower leg: No edema. Left lower leg: No edema. Neurological:      Mental Status: He is alert.            Medications:   polyethylene glycol  17 g Oral Daily    NIFEdipine  30 mg Oral BID    metoprolol succinate  50 mg Oral BID    magnesium oxide  400 mg Oral BID    QUEtiapine  100 mg Oral Nightly    QUEtiapine  25 mg Oral BID    mineral oil-hydrophilic petrolatum   Topical Daily    insulin lispro  0-18 Units SubCUTAneous TID WC    insulin lispro  0-9 Units SubCUTAneous Nightly    insulin glargine  25 Units SubCUTAneous Nightly    metoclopramide  10 mg Oral 4x Daily AC & HS    fluticasone  2 spray Each Nostril Nightly    heparin (porcine)  5,000 Units SubCUTAneous 3 times per day    pantoprazole  40 mg Oral BID    PARoxetine  20 mg Oral Daily    multivitamin  1 tablet Oral Daily    tamsulosin  0.4 mg Oral Nightly         Labs:  Recent Labs     05/05/22  0801   WBC 8.5   HGB 8.3*   HCT 23.6*   MCV 91.1        Recent Labs     05/04/22  0659 05/05/22  0801 05/06/22  0643    137 138   K 3.7 3.7 3.6    102 102   CO2 24 24 25   GLUCOSE 130* 139* 120*   BUN 14 12 10   CREATININE 1.2 1.1 1.1   LABGLOM >60 >60 >60   GFRAA >60 >60 >60       Assessment/Plan:    Acute Kidney Injury. - possibly pre-renal, in setting of poor po intake, hypotension.  - H/O recent RODRICK requiring RRT through 3/1/22.  - continue holding ACEi, lasix    Hyperkalemia  -stopped lisinopril  -improved      MSSA bacteremia with left foot abscess, left hand abscess, osteomyelitis, RUL abscess  - finished oral doxycycline 4/28     Multiple wounds   - wound vac to sacral wound and left foot wound  - wound care     HTN  - off lisinopril  - on metoprolol,   - started Nifedipine for better BP control     DM2   - insulin       Anemia  - recent GIB s/p embolization of gastroduodenal artery on 2/25 and ex-lap  - monitor and transfuse for Hb<7     H/O Atrial Fibrillation  - on  lopressor  - AC contraindicated due to bleeding risk     MS changes; not likely to be due to RODRICK  - Neurontin, Tizanidine and Trazodone on hold. - Improved. We will sign off the case for now, please call us back with any questions.

## 2022-05-06 NOTE — PROGRESS NOTES
[facility]  Follow-up Progress Note       NAME:  Han Aden  MEDICAL RECORD NUMBER:  [id number]  AGE:  42 y.o. GENDER:  male  :  1975  TODAY'S DATE:  2022    Subjective: Mother in room with patient. \"You can move my tray, I'm nauseated right now and don't want anything to eat\". Wound Identification:  Wound Type: traumatic left foot.  Healing stage 4 pressure injury coccyx/sacrum.   Contributing Factors:  edema, diabetes, chronic pressure, decreased mobility, shear force, obesity, incontinence of stool and incontinence of urine    Patient Goal of Care:  [x] Wound Healing  [] Odor Control  [] Palliative Care  [x] Pain Control   [] Other:     Objective: Semi sosa in bed with eyes closed. Mother by bedside. /69   Pulse 77   Temp 97.4 °F (36.3 °C) (Oral)   Resp 18   Ht 6' 0.5\" (1.842 m)   Wt 230 lb (104.3 kg)   SpO2 97%   BMI 30.76 kg/m²   Shimon Risk Score: Shimon Scale Score: 14  Assessment: Left buttock posterior edge of wound light pink, moist.  Main body of wound left & right buttocks filling with granulation. Scant bloody discharge to right lateral edges. Left dorsal foot wound size decreasing, red/pink in color. Scant yellow spot to anterior of wound. Measurements:  Negative Pressure Wound Therapy Coccyx (Active)   $ Standard NPWT <=50 sq cm PER TX $ Yes 22 1317   $ Standard NPWT >50 sq cm PER TX $ Yes 22 1605   Wound Type Pressure ulcer: Stage IV 22 1317   Unit Type [employer] [id number] 22 1317   Dressing Type Black Foam 22 1317   Number of pieces used 1 22 1317   Number of pieces removed 1 22 1317   Cycle Continuous 22 1317   Target Pressure (mmHg) 125 22 1317   Intensity 1 22 1317   Canister changed?  No 22 1317   Dressing Status New dressing applied 22 1317   Dressing Changed Changed/New 22 1317   Drainage Amount Scant 22 1317   Drainage Description Serosanguinous 05/06/22 1317   Dressing Change Due 05/09/22 05/06/22 1317   Output (ml) 50 ml 04/22/22 1704   Wound Assessment Red;Pink;Granulation tissue 05/06/22 1317   Shape heart shape 05/06/22 1317   Odor None 05/06/22 1317   Number of days: 93       Wound 01/24/22 Foot Left;Dorsal I & D to left dorsal foot by Dr. Xin Mei, surgical wound (Active)   Number of days: 102       Wound 01/30/22 Sacrum SDTI (Active)   Wound Image   05/04/22 1700   Wound Etiology Pressure Stage 4 05/06/22 1317   Dressing Status New dressing applied 05/06/22 1317   Wound Cleansed Cleansed with saline 05/06/22 1317   Dressing/Treatment Negative pressure wound therapy 05/06/22 1317   Offloading for Diabetic Foot Ulcers Offloading ordered 05/06/22 1317   Dressing Change Due 05/09/22 05/06/22 1317   Wound Length (cm) 7.5 cm 05/04/22 1700   Wound Width (cm) 5 cm 05/04/22 1700   Wound Depth (cm) 0.5 cm 05/04/22 1700   Wound Surface Area (cm^2) 37.5 cm^2 05/04/22 1700   Change in Wound Size % (l*w) -87.5 05/04/22 1700   Wound Volume (cm^3) 18.75 cm^3 05/04/22 1700   Wound Healing % -38 05/04/22 1700   Distance Tunneling (cm) 0 cm 05/04/22 1700   Tunneling Position ___ O'Clock 0 05/04/22 1700   Undermining Starts ___ O'Clock 0 05/04/22 1700   Undermining Ends___ O'Clock 0 05/04/22 1700   Undermining Maxium Distance (cm) 0 05/04/22 1700   Wound Assessment Pink/red;Granulation tissue 05/06/22 1317   Drainage Amount Scant 05/06/22 1317   Drainage Description Serosanguinous 05/06/22 1317   Odor None 05/06/22 1317   Shanita-wound Assessment Blanchable erythema 05/06/22 1317   Margins Attached edges; Defined edges 05/06/22 1317   Wound Thickness Description not for Pressure Injury Full thickness 05/06/22 1317   Number of days: 96      Sacrum            Wound 04/29/22 Hand Left;Posterior (Active)   Wound Image   05/06/22 1317   Wound Etiology Other 05/06/22 1317   Dressing Status New dressing applied 05/06/22 1317   Wound Cleansed Cleansed with saline 05/06/22 1317   Dressing/Treatment Alginate with Ag 05/06/22 1317   Offloading for Diabetic Foot Ulcers Offloading ordered 05/06/22 1317   Dressing Change Due 05/09/22 05/06/22 1317   Wound Length (cm) 2 cm 05/06/22 1317   Wound Width (cm) 2.5 cm 05/06/22 1317   Wound Depth (cm) 0.1 cm 05/06/22 1317   Wound Surface Area (cm^2) 5 cm^2 05/06/22 1317   Change in Wound Size % (l*w) 44.44 05/06/22 1317   Wound Volume (cm^3) 0.5 cm^3 05/06/22 1317   Distance Tunneling (cm) 0 cm 05/06/22 1317   Tunneling Position ___ O'Clock 0 05/06/22 1317   Undermining Starts ___ O'Clock 0 05/06/22 1317   Undermining Ends___ O'Clock 0 05/06/22 1317   Undermining Maxium Distance (cm) 0 05/06/22 1317   Wound Assessment Slough 05/06/22 1317   Drainage Amount Scant 05/06/22 1317   Drainage Description Serous 05/06/22 1317   Odor None 05/06/22 1317   Shanita-wound Assessment Blanchable erythema 05/06/22 1317   Margins Attached edges 05/06/22 1317   Wound Thickness Description not for Pressure Injury Partial thickness 05/06/22 1317   Number of days: 7   Hand left         Wound Image   05/06/22 1317   Dressing Status New dressing applied 05/06/22 1317   Dressing Change Due 05/09/22 05/06/22 1317   Incision Cleansed Cleansed with saline 05/06/22 1317   Dressing/Treatment Alginate with Ag;Hydrocolloid 05/06/22 1317   Incision Length (cm) 0.5 05/04/22 1700   Incision Width (cm) 2.5 cm 05/04/22 1700   Incision Depth (cm) 0.1 cm 05/04/22 1700   Margins Approximated 05/06/22 1317   Incision Assessment Other (Comment) 05/06/22 1317   Drainage Amount Scant 05/06/22 1317   Drainage Description Serosanguinous 05/06/22 1317   Odor None 05/06/22 1317   Shanita-incision Assessment Blanchable erythema 05/06/22 1317   Number of days: 59     Number of days: 59   Anterior foot left      Response to treatment:  Well tolerated by patient.      Pain Assessment:  Severity:  6 / 10  Quality of pain: sharp, aching  Wound Pain Timing/Severity: intermittent  Premedicated: Yes  Plan:   Plan of Care: Wound 01/30/22 Sacrum SDTI-Dressing/Treatment: Negative pressure wound therapy  [REMOVED] Wound 02/14/22 Head Left; Lower; Posterior Friction/pressure wound-unstageable. 4/14/22 healed now, will complete LDA per CWOCN-Dressing/Treatment: Open to air  [REMOVED] Wound 01/24/22 Hand Left;Dorsal-Dressing/Treatment: Open to air  Wound 04/29/22 Hand Left;Posterior-Dressing/Treatment: Alginate with Ag    Recommendations;   Current dressing removed.  Cleaned wounds with normal saline. One black sponge placed in right and mid coccyx. Left buttocks covered with Alginate AG and duoderm dressing.  Tracked to left hip. Connected to 125 mmHg low continuous suction.  Wound care to continue to follow.  Call Wound care for problems with seal at [phone] or call [phone] and leave message. Thanks     Recommend: Treatment changed today to left foot dressing. Clean feet and legs with warm soapy water, foamy cleanser or bath wipes daily.  Pat dry.  Apply aquapor ointment to feet and legs daily.   Clean left foot wound with normal saline. Apply alginate Ag then hydrocolloid dressing. Change 3 times a week with VAC dressing changes. M-W-F   Cleanse left posterior hand with normal saline, apply alginate Ag then hydrocolloid. Change with VAC dressing M-W-F. Continue NPWT dressing to coccyx wound change 3 times a week    Specialty Bed Required : Yes   [x] Low Air Loss   [] Pressure Redistribution  [] Fluid Immersion  [] Bariatric  [] Total Pressure Relief  [] Other:     Current Diet: ADULT DIET;  Regular; 4 carb choices (60 gm/meal)  ADULT ORAL NUTRITION SUPPLEMENT; Breakfast, Dinner, Lunch; Diabetic Oral Supplement  Dietician consult:  Yes    Discharge Plan:  Placement for patient upon discharge: skilled nursing   Patient appropriate for Outpatient [address]: Yes    Referrals:  [x]   Following  [] [address]  [] Supplies  [] Other    Patient/Caregiver Teaching: Mother impressed with healing of wounds.   Level of patient/caregiver understanding able to:   [] Indicates understanding       [] Needs reinforcement  [] Unsuccessful      [x] Verbal Understanding  [] Demonstrated understanding       [] No evidence of learning  [] Refused teaching         [] N/A       Electronically signed by Zeferino Chavarria RN, on 5/6/2022 at 1:27 PM

## 2022-05-06 NOTE — PROGRESS NOTES
Chrissy Amor  5/6/2022  [id number]    Chief Complaint: Critical illness myopathy    Subjective:   No acute events overnight. Today Hawk is seen in his room. He reports that his nausea is improved and he has not had any emesis this morning. He reports feeling overall improved. He does not increased pain. ROS: denies f/c, cp, sob, n/v    Objective:  Patient Vitals for the past 24 hrs:   BP Temp Temp src Pulse Resp SpO2 Weight   05/06/22 0810 118/69 97.4 °F (36.3 °C) Oral 77 18 97 % --   05/06/22 0600 -- -- -- -- -- -- 230 lb (104.3 kg)   05/06/22 0426 -- -- -- -- 16 -- --   05/06/22 0356 -- -- -- -- 16 -- --   05/05/22 2159 -- -- -- -- 17 -- --   05/05/22 2130 (!) 144/76 97.5 °F (36.4 °C) Oral 77 18 96 % --   05/05/22 1230 (!) 168/86 -- -- 86 -- -- --     Gen: Alert, NAD, supine in bed  HEENT: Normocephalic, atraumatic  CV: extremities well perfused  Resp: No respiratory distress.    Abd: Soft, nontender, nondistended  Ext: No edema  Skin: W[place] to sacrum and L foot with appropriate seal   Neuro: Alert, oriented, speech fluent without aphasia  Psych: appropriate mood and affect    Wt Readings from Last 3 Encounters:   05/06/22 230 lb (104.3 kg)   03/10/22 255 lb 1.6 oz (115.7 kg)   10/14/21 282 lb (127.9 kg)       Laboratory data:   Lab Results   Component Value Date    WBC 8.5 05/05/2022    HGB 8.3 (L) 05/05/2022    HCT 23.6 (L) 05/05/2022    MCV 91.1 05/05/2022     05/05/2022       Lab Results   Component Value Date     05/06/2022    K 3.6 05/06/2022     05/06/2022    CO2 25 05/06/2022    BUN 10 05/06/2022    CREATININE 1.1 05/06/2022    GLUCOSE 120 05/06/2022    CALCIUM 10.3 05/06/2022        Therapy progress:  PT  Position Activity Restriction  Other position/activity restrictions: RUE PICC, sacral wound vac  Objective     Sit to Stand: Maximum Assistance,Dependent/Total,2 Person Assistance  Stand to sit: Maximum Assistance,Dependent/Total,2 Person Assistance  Bed to Chair: Dependent/Total OT  PT Equipment Recommendations  Equipment Needed: No  Mobility Devices: [facility] Bed : Electric - Full (Head of Bed),Bed Rails - Natalia Shukla  Other: defer to SNF     Assessment        SLP  Current Diet : Regular  Current Liquid Diet : Thin  Diet Solids Recommendation: Regular  Liquid Consistency Recommendation: Thin    Body mass index is 30.76 kg/m². Assessment and Plan:  Xavier Duverney is a 42year old male with a past medical history significant for DM2, diabetic foot ulcer with multiple amputations, HFrEF who has had a prolonged hospital course following Covid pneumonia with complications including respiratory failure, GIB, and multiple infections. He was admitted to [facility] on 4/13/22 due to functional deficits below his baseline.      Critical Illness Myopathy  - due to covid pneumonia  - PT, OT, ST     MSSA bactermia  - with left foot abscess, left hand abscess, osteomyelitis, RUL abscess  - transitioned to oral doxycycline, continued through 4/28     Multiple wounds POA  - wound vac to sacral wound and left foot wound  - wound care    Encephalopathy, improved  - etiology unclear. Workup not revealing of infectious, metabolic or intracranial etiology. - wbc, ammonia, electrolytes, lactic acid, and ABG within normal limits  - CT head negative for acute process  - ID following, low suspicion for infection  - possibly polypharmacy? Although patient had been stable on pain medications when hallucinations and encephalopathy developed. Have wean down and discontinued scheduled dilaudid.  - gabapentin and trazodone discontinue  - tizanidine had been held due to AMS, will try resuming  - seroquel per Psych  - mentation improved    Nausea and vomiting, improved  - chronic problem, but worsening symptoms today and yesterday  - Medicine following, appreciate assistance  - XR abd showing possible ileus.  CT AP negative for ileus  - GI consulted, appreciate assistance, started mag ox BID  - continue PRN nausea medications  - reached out to medicine about further management     HFrEF  - EF 35%  - discontinued lasix due to RODRICK, patient remains euvolemic on exam  - daily weights     RODRICK on CKD  - RODRICK due to sepsis, cardiac arrest. Required dialysis during acute stay  - Cr trending down. Previously been in 1-1.2 range  - stopped lasix  - fluids per Nephrology, appreciate assistance    Hyperkalemia, resolved  - suspect due to kidney dysfunction  - s/p lokemia 4/26  - Nephro following    Paroxysmal Atrial Fibrillation  - BB  - AC contraindicated due to bleeding risk    HTN  - lopressor 12.5 mg BID  - discontinued lisinopril     DM2 complicated by neuropathy  - home regimen lantus 75, prandial 15, SSI  - lantus 25 plus SSI  - discontinued 4 units prandial as patient has not been receiving      Diabetic Neuropathy  - gabapentin discontinued while patient was having AMS.  AMS resolved, can consider reintroducing     Acute blood loss anemia  - recent GIB s/p embolization of gastroduodenal artery on 2/25 and ex-lap  - monitor and transfuse for Hb<7     History of GIB  - PPI      Urinary Retention  - flomax  - ISC with high volumes, rea replaced  - will need follow up with Urology     Chronic Pain  - discontinued dilaudid due to AMS  - oxycodone PRN  - restarted tizanidine PRN and monitoring mental status      Bowel: Per protocol  Bladder: Per protocol  Sleep: Trazodone discontinued due to AMS  DVT PPx: heparin    Services/DME: SNF  EDOD: 5/6/22     Follow up appointments: PCP, Cardiology, wound care, Urology      Electronically signed by Garrett Canales MD on 5/6/2022 at 11:29 AM

## 2022-05-06 NOTE — PROGRESS NOTES
PROGRESS NOTE  S:46 yrs Patient  admitted on 4/13/2022 with Critical illness myopathy [G72.81] . States felt better after BM. Still some nausea but tolerated breakfast.  Concern regarding intake despite antiemetics    Current Hospital Schedued Meds   polyethylene glycol  17 g Oral Daily    NIFEdipine  30 mg Oral BID    metoprolol succinate  50 mg Oral BID    magnesium oxide  400 mg Oral BID    QUEtiapine  100 mg Oral Nightly    QUEtiapine  25 mg Oral BID    mineral oil-hydrophilic petrolatum   Topical Daily    insulin lispro  0-18 Units SubCUTAneous TID WC    insulin lispro  0-9 Units SubCUTAneous Nightly    insulin glargine  25 Units SubCUTAneous Nightly    metoclopramide  10 mg Oral 4x Daily AC & HS    fluticasone  2 spray Each Nostril Nightly    heparin (porcine)  5,000 Units SubCUTAneous 3 times per day    pantoprazole  40 mg Oral BID    PARoxetine  20 mg Oral Daily    multivitamin  1 tablet Oral Daily    tamsulosin  0.4 mg Oral Nightly     Current Hospital IV Meds   dextrose       Current Hospital PRN Meds  tiZANidine, promethazine, senna, haloperidol, QUEtiapine, oxyCODONE, oxyCODONE, prochlorperazine, ondansetron, ondansetron, hydrALAZINE, acetaminophen, diclofenac sodium, glucose, glucagon (rDNA), dextrose, bisacodyl, aluminum & magnesium hydroxide-simethicone, dextrose bolus **OR** dextrose bolus    Exam:   Vitals:    05/06/22 0810   BP: 118/69   Pulse: 77   Resp: 18   Temp: 97.4 °F (36.3 °C)   SpO2: 97%     I/O last 3 completed shifts:   In: 5 [P.O.:720]  Out: 3750 [Urine:3750]   General appearance: alert, appears stated age and cooperative  HEENT: PERRLA  Neck: no adenopathy, no carotid bruit, no JVD, supple, symmetrical, trachea midline and thyroid not enlarged, symmetric, no tenderness/mass/nodules  Lungs: clear to auscultation bilaterally  Heart: regular rate and rhythm, S1, S2 normal, no murmur, click, rub or gallop  Abdomen: soft,

## 2022-05-06 NOTE — PROGRESS NOTES
Hospitalist Progress Note      PCP: Ivette Núñez MD    Date of Admission: 4/13/2022    Chief Complaint: Encephalopathy    Hospital Course: 42 y.o. male with a PMH of Diabetes mellitus type 2 poorly controlled, CHF, history of diabetic ulcer with amputation of the right great toe, Obesity, Neuropathy, CM and history of tobacco abuse who was admitted to [facility] on 1/22/22 with Sepsis/RODRICK/MSSA bacteremia-from left foot infection, Resp failure which progressed to cardiac arrest. He was d/c'd to a LTACH on 3/10/2022. Admitted to ARU on 4/13/2022. Hospitalist consulted due mental status change. Patient reportedly easily agitated and hallucinating. Psych was consulted      Subjective:       Late entry. Pt seen earlier this am. He got enema yesterday and had a large non bloody BM. Feels better today.  N/V improving and tolerated his breakfast           Medications:  Reviewed    Infusion Medications    dextrose       Scheduled Medications    polyethylene glycol  17 g Oral Daily    NIFEdipine  30 mg Oral BID    metoprolol succinate  50 mg Oral BID    magnesium oxide  400 mg Oral BID    QUEtiapine  100 mg Oral Nightly    QUEtiapine  25 mg Oral BID    mineral oil-hydrophilic petrolatum   Topical Daily    insulin lispro  0-18 Units SubCUTAneous TID WC    insulin lispro  0-9 Units SubCUTAneous Nightly    insulin glargine  25 Units SubCUTAneous Nightly    metoclopramide  10 mg Oral 4x Daily AC & HS    fluticasone  2 spray Each Nostril Nightly    heparin (porcine)  5,000 Units SubCUTAneous 3 times per day    pantoprazole  40 mg Oral BID    PARoxetine  20 mg Oral Daily    multivitamin  1 tablet Oral Daily    tamsulosin  0.4 mg Oral Nightly     PRN Meds: tiZANidine, promethazine, senna, haloperidol, QUEtiapine, oxyCODONE, oxyCODONE, prochlorperazine, ondansetron, ondansetron, hydrALAZINE, acetaminophen, diclofenac sodium, glucose, glucagon (rDNA), dextrose, bisacodyl, aluminum & magnesium hydroxide-simethicone, dextrose bolus **OR** dextrose bolus      Intake/Output Summary (Last 24 hours) at 5/6/2022 1714  Last data filed at 5/6/2022 1317  Gross per 24 hour   Intake 1080 ml   Output 1575 ml   Net -495 ml       Physical Exam Performed:    /69   Pulse 77   Temp 97.4 °F (36.3 °C) (Oral)   Resp 18   Ht 6' 0.5\" (1.842 m)   Wt 230 lb (104.3 kg)   SpO2 97%   BMI 30.76 kg/m²     General appearance: Obese. lethargic. No apparent distress, appears stated age  [de-identified]:  Normal cephalic, atraumatic without obvious deformity. Conjunctivae/corneas clear. Neck: Supple, with full range of motion. No jugular venous distention. Trachea midline. Respiratory:  Normal respiratory effort. Clear to auscultation, bilaterally without Rales/Wheezes/Rhonchi. Cardiovascular:  Regular rate and rhythm with normal S1/S2 without murmurs, rubs or gallops. Abdomen: Soft, non-tender, non-distended with normal bowel sounds. Musculoskeletal:  No clubbing, cyanosis or edema bilaterally. Skin:  warm and dry   Neurologic:  No overt  focal deficits,  Psychiatric:  awake, not anxious appearing      Labs:   Recent Labs     05/05/22  0801   WBC 8.5   HGB 8.3*   HCT 23.6*        Recent Labs     05/04/22  0659 05/05/22  0801 05/06/22  0643    137 138   K 3.7 3.7 3.6    102 102   CO2 24 24 25   BUN 14 12 10   CREATININE 1.2 1.1 1.1   CALCIUM 10.0 10.5 10.3     No results for input(s): AST, ALT, BILIDIR, BILITOT, ALKPHOS in the last 72 hours. No results for input(s): INR in the last 72 hours. No results for input(s): Malcolm Carmen in the last 72 hours.     Urinalysis:      Lab Results   Component Value Date    NITRU Negative 04/27/2022    WBCUA 21-50 04/27/2022    BACTERIA Rare 01/22/2022    RBCUA 3-4 04/27/2022    BLOODU TRACE-INTACT 04/27/2022    SPECGRAV 1.010 04/27/2022    GLUCOSEU Negative 04/27/2022       Radiology:  CT ABDOMEN PELVIS WO CONTRAST Additional Contrast? Oral   Final Result   There is no evidence of obvious obstruction or evidence of ileus throughout   the abdomen or pelvis. No acute intra-or pelvic abnormality. XR ABDOMEN (KUB) (SINGLE AP VIEW)   Final Result   Possible focal ileus on the left side of the abdomen         CT HEAD WO CONTRAST   Final Result   No acute intracranial abnormality. XR CHEST PORTABLE   Final Result   Low lung volumes, with subtle bilateral perihilar opacities concerning for   multifocal pneumonia. US RENAL COMPLETE   Final Result   Unremarkable ultrasound of the kidneys and urinary bladder. Assessment/Plan:    Active Hospital Problems    Diagnosis     Moderate malnutrition ([facility] [address] [E44.0]     Critical illness myopathy [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 No overt signs of infection- infection ruled out per ID. CT head non acute. Held neurontin, zanaflex, desyrel. Psych recs appreciated. Improving with seroquel- continue med. Mentation improved to baseline. Resolved           RODRICK, improving. Nephrology following     Multiple wounds: continue wound care      HTN- nephro managing. Continue meds       PAF-now in SR, no AC d/t bleeding risk     MSSA bacteremia with left foot abscess, left hand abscess, osteomyelitis, RUL abscess  - finished course of abx     Anemia:  recent GIB s/p embolization of gastroduodenal artery on 2/25 and ex-lap. Monitor hgb and transfuse if <7    N/V :  Persistent of unclear etiology. Possible focal ileus on left side of abdomen was noted on KUB. Lipase was normal. CT abd/pelvis  non acute - no ileus or obstruction noted. GI consulted, recs appreciated. Symptoms improved today. Continue supportive care with antiemetics PRN,  PPI        Constipation: improved - had BM s/p enema. Encouraged pt to continue bowel regimen ordered          DMII: BG fairly controlled.   Continue insulin            DVT Prophylaxis: heparin  Diet: ADULT ORAL NUTRITION SUPPLEMENT; Breakfast, Dinner, Lunch; Diabetic Oral Supplement  ADULT DIET;  Regular; 5 carb choices (75 gm/meal)  ADULT ORAL NUTRITION SUPPLEMENT; Lunch, Dinner; Frozen Oral Supplement  Code Status: Full Code    PT/OT Eval Status: ARU    Dispo -  Per primary team.       Discussed with RN, pt.          Pura Krabbe, MD

## 2022-05-06 NOTE — PLAN OF CARE
Problem: Pain:  Goal: Pain level will decrease  Description: Pain level will decrease  Outcome: Progressing  Goal: Control of acute pain  Description: Control of acute pain  Outcome: Progressing  Goal: Control of chronic pain  Description: Control of chronic pain  Outcome: Progressing     Problem: Falls - Risk of:  Goal: Will remain free from falls  Description: Will remain free from falls  5/6/2022 0122 by Edil An RN  Outcome: Progressing  5/5/2022 1130 by Luciano Coley RN  Outcome: Progressing  Note: Fall precautions in place, bed alarm not indicated, nonskid foot wear applied, bed in lowest position, and call light within reach. Will continue to monitor.         Goal: Absence of physical injury  Description: Absence of physical injury  Outcome: Progressing     Problem: Skin Integrity:  Goal: Will show no infection signs and symptoms  Description: Will show no infection signs and symptoms  Outcome: Progressing  Goal: Absence of new skin breakdown  Description: Absence of new skin breakdown  Outcome: Progressing     Problem: Nutrition  Goal: Optimal nutrition therapy  Outcome: Progressing     Problem: Discharge Planning  Goal: Discharge to home or other facility with appropriate resources  Outcome: Progressing     Problem: Nutrition Deficit:  Goal: Optimize nutritional status  Outcome: Progressing     Problem: ABCDS Injury Assessment  Goal: Absence of physical injury  Outcome: Progressing

## 2022-05-06 NOTE — PROGRESS NOTES
Physical Therapy  Facility/Department: [facility]  Rehabilitation Physical Therapy Treatment Note    NAME: Jayme Wan  : 1975 (42 y.o.)  MRN: [id number]  CODE STATUS: Full Code    Date of Service: 22       Restrictions:  Restrictions/Precautions: General Precautions; Fall Risk  Position Activity Restriction  Other position/activity restrictions: RUE PICC, sacral wound vac     SUBJECTIVE  Subjective  Subjective: pt in bed, agreeable to co-treat  Pain: Pt reporting \"a little bit\" of pain but does not formally rate           OBJECTIVE  Cognition  Overall Cognitive Status: Exceptions  Arousal/Alertness: Delayed responses to stimuli  Following Commands: Follows one step commands with repetition; Follows multistep commands with repitition  Attention Span: Attends with cues to redirect  Memory: Decreased recall of precautions;Decreased short term memory  Safety Judgement: Decreased awareness of need for assistance;Decreased awareness of need for safety  Problem Solving: Assistance required to correct errors made;Assistance required to implement solutions;Assistance required to generate solutions;Assistance required to identify errors made  Insights: Decreased awareness of deficits  Initiation: Requires cues for some  Sequencing: Requires cues for some  Orientation  Overall Orientation Status: Within Functional Limits    Vitals: 99%, 81 bpm, 110/66    Functional Mobility  Roll Left  Assistance Level: Stand by assist  Roll Right  Assistance Level: Stand by assist  Supine to Sit  Assistance Level: Minimal assistance (bed flat)      Pt completes STS with herb lopez, in // bars, and at RW with mod A x2 progressing to max A X2 with fatigue. Pt requires cues for safe hand placement when transferring to the RW. Pt propels manual w/c 222 ft with BUE and supervision with slow emili. Pt manages R and L turns without assistance or cues. Pt requires assistance for brakes management.      Pt ambulates x5 ft in // bars with min A x2 for balance and foot placement. Pt demos significant improvement in standing tolerance, ability to lift each LE with swing phase, and improved knee extension and balance in stance phase. Pt requires less cues for sequencing and weight shift. Pt completes with BUE support on // bars and w/c follow (dependent overall). Balance beam placed between feet to widen CRYSTAL and prevent scissor gait. Attempted gait trial with RW; however, pt too fatigued upon standing to complete. Pt completes SPT toward R and L with mod A x2 and RW with increased time and cues. Pt requires assist and cues for turning RW and aligning self with mat. Pt fatiguing and does not reach back for surface prior to sitting. Pt completes with BLE in external rotation. Pt requires use of herb stedy for dependent transfers bed>w/c and w/c>recliner prior to trialing RW and with fatigue at end of session. ASSESSMENT/PROGRESS TOWARDS GOALS  Assessment  Assessment: Patient seen as co-treat with OT to facilitate safe transfers and gait training d/t pt complexity, MARIE, and weakness. Patient cleared by RN for therapy participation this date. Patient agreeable to therapy. Pt sat EOB with SBA while completing ADL's with OT. Pt completes rolling with SBA and supine>sit with min A. Patient completed transfers with mod A x2 progressing to max A x2. Pt ambulates length of // bars with BUE support and min A x2 with assist for balance, safety, and foot placement with use of balance beam for CRYSTAL. Pt requires w/c follow for gait (dependent overall). Pt completes SPT toward R and L w/c<>EOM with mod A x2 and RW. Pt requires prolonged seated rest breaks d/t fatigue. Pt does demo significant improvement in activity tolerance and demos increased mobility with less assistance this date. Pt with R knee brace from home donned throughout session, pt reports improvement with use.  Pt agreeable to sit up in chair at end of session for 30 mins until next PT session. Pt will continue to benefit from skilled PT in ARU to progress strength, endurance, balance, and independence with mobility. Activity Tolerance: Patient limited by fatigue;Patient limited by endurance; Patient limited by pain  Discharge Recommendations: Subacute/Skilled Nursing Facility  PT Equipment Recommendations  Equipment Needed: No  Other: defer to SNF    Addendum Second Session  Assessment: Pt seen for LE strengthening and transfer training. Pt requesting to return to bed at start of session. Pt positioned in bed with needs met at end of session. Pt's mother present throughout session. Bed mobility: sit>supine with assist from PCA (appears mod A x2) for trunk and BLE. Pt requires assistance to reposition in bed. Transfers: Pt completes STS with herb stedy with Ax2 from PCA (appears mod Ax2).  Pt then transfers recliner>bed with herb stedy (dependent)    Exercises:  -2x 15 BLE quad sets  -2x 15 Manish glute sets  -2x 15 BLE heel slides with support under heel  -1x 15 BLE supine abduction with support under heel  -1x 15 manish adduction holds against pillow  -1x 15 BLE SAQ    Goals  Patient Goals   Patient goals : \"to walk again\"  Short Term Goals  Time Frame for Short term goals: 10 days- 4/22/22  Short term goal 1: Pt will complete bed mobility with min A; goal partially met 4/22 - pt completes supine>sit with min A and up to mod A x2 for sit>supine  Short term goal 2: Pt will complete functional transfers with max A x 1 using LRAD; not met 4/22 - pt requires up to max A x2 in // bars and herb stedy  Short term goal 3: Pt will propel manual w/c 50ft with min A; goal met 4/21 - pt propels manual w/c 75 ft with min A  Short term goal 4: Pt will tolerate gait assessment and appropriate LTG to be set; goal not met 4/22 - pt attempts to step but unable to complete successfully to attempt walking  Long Term Goals  Time Frame for Long term goals : 3 weeks- 5/4/22 - extended to 5/7 d/t longer than expected LOS  Long term goal 1: Pt will complete bed mobility with supervision; not met 5/6 - pt requires min-mod A x2 for bed mobility  Long term goal 2: Pt will complete functional transfers with min A using LRAD; not met 5/6 -  pt requires mod A x2 for transfers with RW  Long term goal 3: Pt will propel manual w/c 150ft with mod I; not met 5/6 - pt requires supervision for w/c propulsion >150 ft    PLAN OF CARE/SAFETY  Plan  Plan:  minutes of therapy at least 5 out of 7 days a week  Plan weeks: 3 weeks  Current Treatment Recommendations: Strengthening;Balance training;Functional mobility training;Transfer training; Endurance training;Gait training;Neuromuscular re-education;Home exercise program;Safety education & training;Patient/Caregiver education & training; Wheelchair mobility training;Equipment evaluation, education, & procurement;Positioning;Manual Therapy - Soft Tissue Mobilization; Therapeutic activities  Safety Devices  Type of Devices: All annemarie prominences offloaded; All fall risk precautions in place;Call light within reach; Patient at risk for falls;Nurse notified; Chair alarm in place; Left in chair;Gait belt  Restraints  Restraints Initially in Place: No      Therapy Time   Individual Concurrent Group Co-treatment   Time In      0930   Time Out      1030   Minutes      60     Second Session Therapy Time:   Individual Concurrent Group Co-treatment   Time In 1100        Time Out 1130        Minutes 30          Timed Code Treatment Minutes:  90 mins       Sofya Dahl PT, 05/06/22 at 11:52 AM

## 2022-05-06 NOTE — PROGRESS NOTES
Occupational Therapy  Facility/Department: [facility]  Rehabilitation Occupational Therapy Daily Treatment Note    Date: 22  Patient Name: Becky Nicholas       Room: 1793/7841-40  MRN: [id number]  Account: [de-identified]   : 1975  (41 y.o.) Gender: male                    Past Medical History:  has a past medical history of Abscess of left foot, Abscess of left hand, Acute encephalopathy, Acute hypoxemic respiratory failure ([address] Acute osteomyelitis of left hand ([address] Acute osteomyelitis of right hallux ([address] Cardiopulmonary arrest ([address] Cellulitis of left foot, CHF (congestive heart failure) ([address] Chronic osteomyelitis of left foot ([address] Closed displaced fracture of distal phalanx of right little finger, Clostridium difficile infection, Diabetic ulcer of left forefoot associated with type 2 diabetes mellitus, with necrosis of bone ([address] Diabetic ulcer of right great toe associated with type 2 diabetes mellitus, with necrosis of bone ([address] Enterococcal infection, 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 Past Surgical History:   has a past surgical history that includes Tonsillectomy; [place] tooth extraction; Knee arthroscopy (Left, 08/15/2013); Toe amputation (Right, 2019); other surgical history (Left, 2020); Toe amputation (Left, 2020); Toe amputation (Left, 10/22/2020); Foot Debridement (Left, 2022); Arm Surgery (Left, 2022);  IR NONTUNNELED VASCULAR CATHETER > 5 YEARS (02/11/2022); Upper gastrointestinal endoscopy (N/A, 02/17/2022); IR EMBOLIZATION HEMORRHAGE (Right, 02/25/2022); Upper gastrointestinal endoscopy (N/A, 2/27/2022); laparotomy (N/A, 2/27/2022); Foot Debridement (Left, 3/8/2022); Cholecystectomy; Dilatation, esophagus; and Endoscopy, colon, diagnostic. Restrictions  Restrictions/Precautions: General Precautions; Fall Risk  Other position/activity restrictions: RUE PICC, sacral wound vac    Subjective  Subjective: Pt in bed, more alert. Agreeable to OT/PT cotx. 6/10 in buttocks but tolerated session. Restrictions/Precautions: General Precautions; Fall Risk             Objective     Cognition  Overall Cognitive Status: Exceptions  Arousal/Alertness: Delayed responses to stimuli  Following Commands: Follows one step commands with repetition; Follows multistep commands with repitition  Attention Span: Attends with cues to redirect  Memory: Decreased recall of precautions;Decreased short term memory  Safety Judgement: Decreased awareness of need for assistance;Decreased awareness of need for safety  Problem Solving: Assistance required to correct errors made;Assistance required to implement solutions;Assistance required to generate solutions;Assistance required to identify errors made  Insights: Decreased awareness of deficits  Initiation: Requires cues for some  Sequencing: Requires cues for some  Orientation  Overall Orientation Status: Within Functional Limits   Perception  Overall Perceptual Status: WFL     ADL  Feeding  Assistance Level: Increased time to complete;Set-up; Supervision  Skilled Clinical Factors: sitting upright in bed to take a drink of water  Grooming/Oral Hygiene  Assistance Level: Increased time to complete;Supervision;Set-up  Skilled Clinical Factors: sitting upright at the edge of bed to wash face/hands, brush hair and teeth with increased time  Upper Extremity Bathing  Assistance Level: Supervision;Set-up  Skilled Clinical Factors: sitting EOB to use bathing wipes with setup and increased time  Lower Extremity Bathing  Assistance Level: Increased time to complete;Minimal assistance  Skilled Clinical Factors: sitting upright in bed, used bathing wipes on front area setup and upper BLE, assist with bathing buttocks and B lower legs  Upper Extremity Dressing  Assistance Level: Increased time to complete;Verbal cues; Set-up; Stand by assist  Skilled Clinical Factors: sitting EOB with unsupported sit  Lower Extremity Dressing  Assistance Level: Verbal cues; Minimal assistance; Requires x 2 assistance; Moderate assistance  Skilled Clinical Factors: depA for donning/doffing sock and shoes, Luisa-modA x2 for standing tolerance with steady  Putting On/Taking Off Footwear  Assistance Level: Dependent  Skilled Clinical Factors: depA for donning/doffing sock and shoes  Toileting  Assistance Level: Dependent  Skilled Clinical Factors: sitting on BSC over toilet, use of steady for tranfser  Toilet Transfers  Equipment: Beside commode  Additional Factors: Increased time to complete;Cues for hand placement  Assistance Level: Requires x 2 assistance;Maximum assistance  Skilled Clinical Factors: sitting on BSC over toilet, use of steady for tranfser     Functional Mobility  Device:  (steady, WC)  Activity: To/From therapy gym; To/From bathroom  Assistance Level: Minimal assistance; Requires x 2 assistance  Skilled Clinical Factors: Luisa-modA x2 sit <> Stand from EOB to [facility] with steady, modA x2 sit <>  // bars and mobility trial; OT providing assistance at upright posture and cues for safety/sequencing while PT providing supported BLE and progression of BLE; pt was able to tolerate stand step transfer WC <> EOM with modA x2 and max cues/line mnagement. Attempted to have pt completed mobiltiy in hallway however unable to complete due to fatigue. Bed Mobility  Overall Assistance Level: Minimal Assistance  Additional Factors: With handrails; Head of bed raised; Increased time to complete;Verbal cues  Bridging  Assistance Level: Minimal assistance  Skilled Clinical Factors: to manage pants  Roll Left  Assistance Level: Minimal assistance  Skilled Clinical Factors: to manage pants  Roll Right  Assistance Level: Minimal assistance  Skilled Clinical Factors: to manage pants  Sit to Supine  Assistance Level: Minimal assistance  Supine to Sit  Assistance Level: Stand by assist  Skilled Clinical Factors: HOB elevated use of bed rail  Scooting  Assistance Level: Minimal assistance; Requires x 2 assistance  Skilled Clinical Factors: cues for weight shifting L vs R and scooting to EOB  Transfers  Surface: To bed;From bed; To chair with arms;From chair with arms  Additional Factors: Increased time to complete; Mat raised; With handrails;Verbal cues; Hand placement cues  Device:  (steady, WC to gym)  Sit to Stand  Assistance Level: Moderate assistance; Requires x 2 assistance  Skilled Clinical Factors: modA x2 sit <> stand to steady vs // bars and then to RW for stand step tranfsers  Stand to Sit  Assistance Level: Moderate assistance; Requires x 2 assistance  Skilled Clinical Factors: modA x2 sit <> stand to steady vs // bars and then to RW for stand step tranfsers  Bed To/From Chair  Technique:  (steady EOB to WC)  Assistance Level: Requires x 2 assistance;Maximum assistance  Skilled Clinical Factors: modA x2 sit <> stand to steady vs // bars and then to RW for stand step tranfsers  Stand Pivot  Assistance Level: Moderate assistance; Requires x 2 assistance;Maximum assistance  Skilled Clinical Factors: modA x2 sit <> stand to steady vs // bars and then to RW for stand step tranfsers   Neuromuscular Education  Neuromuscular education: Yes  Trunk Control: PT providing support for BLE with balance beam between BLE for CRYSTAL management in // bars, OT providing cues for upright posture, midline alignment and sequencing wiht mobiltiy trials;  Pt was able to complete stand step transfer WC <> EOM with modA x2 and increased tolerance/strength. Assessment  Assessment  Assessment: Pt remains cotx to increases strength/endurance, NMR/balance, functional tranfser/mobiltiy training/transfers. Pt was able to complete bed mobility SBA-Luisa for LB dressing and bathing tasks, then able to tolerate x15 minutes for UE bathing/dressing and groomign tasks. Pt with increased standing tolerance balance demonstrating need for Luisa-modA x2 sit <> Stand from EOB to WC with steady, modA x2 sit <>  // bars and mobility trial; OT providing assistance at upright posture and cues for safety/sequencing while PT providing supported BLE and progression of BLE; pt was able to tolerate stand step transfer WC <> EOM with modA x2 and max cues/line mnagement. Attempted to have pt completed mobiltiy in hallway however unable to complete due to fatigue. Pt is progressing towards goals but would benefit from SNF at DC to continue consistency with ADL/transfer training for safer DC plan home. Cont OT POC. Second session: Pt in bed, agreeable and pleasant but reports nausea. Pt was able to complete BUE ex with 1# free weight 1 UE at a time 2x10 with rest breaks PRN: chest press, circles forwards/backwards, internal/external rotation, elbow flexion/extension, supination/pronation, wrist flexion/extension, tricep press. Pt completed hook grasp x20 and palmar grasp x20 with 3 second holds. Pt was able to complete tip to tip pinch 2x5 reps with rest breaks PRN. Pt was able to doff shirt Luisa for long sitting. Pt was repositioned in bed for comfort, RN in room to administer medication. Cont OT POC. Activity Tolerance: Patient limited by fatigue;Patient limited by endurance; Patient limited by pain  Discharge Recommendations: Patient would benefit from continued therapy after discharge;Subacute/Skilled Nursing Facility  OT Equipment Recommendations  Equipment Needed: Yes  ADL Assistive Devices: Grab Bars - shower;Transfer Tub Bench  Other: CTA pending progress  Safety Devices  Safety Devices in place: Yes  Type of devices: Left in bed;Patient at risk for falls;Gait belt; All fall risk precautions in place;Call light within reach; Left in chair;Chair alarm in place  Restraints  Initially in place: No    Patient Education  Education  Education Given To: Patient; Family  Education Provided: ADL Function;Role of Therapy;Plan of Care;Equipment;Home Exercise Program;Precautions; Safety; Energy Conservation; Family Education  Education Provided Comments: improtance of OOB activities, strengthening exercises for transfers/ADLs, pain management strategies, repositioning strategies  Education Method: Demonstration;Verbal;Teach Back  Barriers to Learning: None  Education Outcome: Verbalized understanding;Demonstrated understanding;Continued education needed    Plan  Plan  Times per Week: 5-7x per week  Times per Day: Daily  Current Treatment Recommendations: Strengthening;ROM;Balance training;Functional mobility training; Endurance training;Pain management; Safety education & training;Patient/Caregiver education & training;Positioning;Self-Care / ADL;Neuromuscular re-education; Wheelchair mobility training;Equipment evaluation, education, & procurement    Goals  Patient Goals   Patient goals : 1 week (4/21)-EXTEND TO 4/27  Short Term Goals  Time Frame for Short term goals: Pt will complete UB dressing with min A- GOAL MET; Pt SBA for UB dressing at EOB  Short Term Goal 1: Pt will complete LB dressing with mod A-NOT MET, maxA x1-2 5/5  Short Term Goal 2: Pt will complete LB bathing with mod A-GOAL MET 4/26  Short Term Goal 3: Pt will complete UB bathing with SBA-GOAL MET 4/26  Short Term Goal 4: Pt will complete 3 grooming task at w/c level-GOAL MET 4/26  Short Term Goal 5: Pt will complete x20 reps of BUE HEP to improve UB strength/endurance for repositioning and UB ADLs- GOAL MET 4/20;  Pt completing x20 BUE AROM  Additional Goals?: No  Long Term Goals  Time Frame for Long term goals : 3 weeks (5/05)  Long Term Goal 1: Pt will complete total body dressing with supv-GOAL PARTIALLY MET 5/5, setup/SPV UE, mod-maxA LE  Long Term Goal 2: Pt will complete total body bathing with supv-GOAL PARTIALLY MET 5/5, setup/SPV UE, mod-maxA LE  Long Term Goal 3: Pt will complete 3 grooming tasks at sink with mod I-GOAL PARTIALLY MET 5/5, setup/SPV sitting upright in bed  Long Term Goal 4: Pt will perform functional transfers with mod I-DOWNGRADE TO MODA X1 due to inconsistent progress  Additional Goals?: No    Therapy Time   Individual Concurrent Group Co-treatment   Time In       0930   Time Out       1030   Minutes       60   Timed Code Treatment Minutes: 60 Minutes     Second Session Therapy Time:   Individual Concurrent Group Co-treatment   Time In 1400        Time Out 1430        Minutes 30          Timed Code Treatment Minutes:  30 Minutes    Total Treatment Minutes:  90 Minutes    BARON Gomez/CHRISTIAN

## 2022-05-07 NOTE — PLAN OF CARE
Problem: Skin Integrity:  Goal: Will show no infection signs and symptoms  Description: Will show no infection signs and symptoms  Outcome: Progressing     Problem: Pain:  Goal: Pain level will decrease  Description: Pain level will decrease  Outcome: Progressing

## 2022-05-07 NOTE — PROGRESS NOTES
Occupational Therapy  Facility/Department: [facility]  Rehabilitation Occupational Therapy Daily Treatment Note    Date: 22  Patient Name: Ashlee Eason       Room: 3904/6995-57  MRN: [id number]  Account: [de-identified]   : 1975  (41 y.o.) Gender: male         Past Medical History:  has a past medical history of Abscess of left foot, Abscess of left hand, Acute encephalopathy, Acute hypoxemic respiratory failure ([address] Acute osteomyelitis of left hand ([address] Acute osteomyelitis of right hallux ([address] Cardiopulmonary arrest ([address] Cellulitis of left foot, CHF (congestive heart failure) ([address] Chronic osteomyelitis of left foot ([address] Closed displaced fracture of distal phalanx of right little finger, Clostridium difficile infection, Diabetic ulcer of left forefoot associated with type 2 diabetes mellitus, with necrosis of bone ([address] Diabetic ulcer of right great toe associated with type 2 diabetes mellitus, with necrosis of bone ([address] Enterococcal infection, 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 Past Surgical History:   has a past surgical history that includes Tonsillectomy; [place] tooth extraction; Knee arthroscopy (Left, 08/15/2013); Toe amputation (Right, 2019); other surgical history (Left, 2020); Toe amputation (Left, 2020); Toe amputation (Left, 10/22/2020); Foot Debridement (Left, 2022); Arm Surgery (Left, 2022);  IR NONTUNNELED VASCULAR CATHETER > 5 YEARS (02/11/2022); Upper gastrointestinal endoscopy (N/A, 02/17/2022); IR EMBOLIZATION HEMORRHAGE (Right, 02/25/2022); Upper gastrointestinal endoscopy (N/A, 2/27/2022); laparotomy (N/A, 2/27/2022); Foot Debridement (Left, 3/8/2022); Cholecystectomy; Dilatation, esophagus; and Endoscopy, colon, diagnostic. Restrictions  Restrictions/Precautions: General Precautions; Fall Risk  Other position/activity restrictions: RUE PICC, sacral wound vac    Subjective  Subjective: First session pt found supine in bed with PT s/p BM in bed requiring assist to clean up. Second session pt found supine in bed agreeable to OT/PT cotx. Pt reports feeling fatigued this date/  Restrictions/Precautions: General Precautions; Fall Risk             Objective   Vitals: HR 90, spO2 99% RA, /68   Cognition  Overall Cognitive Status: Exceptions  Arousal/Alertness: Delayed responses to stimuli  Following Commands: Follows one step commands with repetition; Follows multistep commands with repitition  Attention Span: Attends with cues to redirect  Memory: Decreased recall of precautions;Decreased short term memory  Safety Judgement: Decreased awareness of need for assistance;Decreased awareness of need for safety  Problem Solving: Assistance required to correct errors made;Assistance required to implement solutions;Assistance required to generate solutions;Assistance required to identify errors made  Insights: Decreased awareness of deficits  Initiation: Requires cues for some  Sequencing: Requires cues for some  Orientation  Overall Orientation Status: Within Functional Limits         ADL  Upper Extremity Dressing  Assistance Level: Minimal assistance  Skilled Clinical Factors: sitting EOB, assist to pull over head  Lower Extremity Dressing  Assistance Level: Dependent; Increased time to complete  Skilled Clinical Factors: depA to son socks; max A to doff pants/brief supine in bed  Putting On/Taking Off Footwear  Assistance Level: Dependent  Skilled Clinical Factors: depA for donning/doffing socks  Toileting  Assistance Level: Dependent  Skilled Clinical Factors: hygiene/clothing management in bed 2x     Functional Mobility  Device:  (WC vs herb plus)  Activity: To/From therapy gym (bed to wc to chair)  Assistance Level: Dependent  Skilled Clinical Factors: modA x3 for mobility training in // bars with OT providing assist for upright posture and safety cues while PT provide BLE/knee support/block and progression of BLE  Bed Mobility  Overall Assistance Level: Minimal Assistance  Additional Factors: With handrails; Head of bed raised; Increased time to complete;Verbal cues  Bridging  Assistance Level: Stand by assist  Skilled Clinical Factors: to manage pants  Roll Left  Assistance Level: Minimal assistance  Skilled Clinical Factors: to manage pants/for toileting hygiene/wound dressing change  Roll Right  Assistance Level: Minimal assistance  Skilled Clinical Factors: to manage pants/for toileting hygiene/wound dressing change  Supine to Sit  Assistance Level: Minimal assistance  Skilled Clinical Factors: HOB elevated use of bed rail  Scooting  Assistance Level: Minimal assistance;Contact guard assist;Requires x 2 assistance  Skilled Clinical Factors: scoot pivot t/f to/from w/c/mat table; cues for feet placement, cues to lift buttocks slightly so not sliding on wound  Transfers  Surface: To chair with arms;From mat; To mat;From bed; Wheelchair  Additional Factors: Increased time to complete; Mat raised; With handrails;Verbal cues; Hand placement cues  Device:  (w/c, herb plus)  Sit to Stand  Assistance Level: Moderate assistance;Maximum assistance; Requires x 2 assistance  Skilled Clinical Factors: mod to maxA x2 depending on pt level of fatigue to  // bars multiple trials, maxA x2 to attempt/complete stand to RW, B knee block  Stand to Sit  Assistance Level: Moderate assistance; Requires x 2 assistance  Bed To/From Chair  Skilled Clinical Factors: dependent use of herb plus for bed to chair transfers  Sit Pivot  Assistance Level: Minimal assistance;Contact guard assist;Requires x 2 assistance  Skilled Clinical Factors: sit/scoot pivot training x3 this date with Luisa + CGA and cues for sequencing/feet/offloading butt when scooting         Assessment  Assessment  Assessment: First session: Pt agreeable to OT tx session and tolerates session fairly. Pt demos ongoing global deconditioning and generalized weakness. Pt completes toileting and LB dressing ADLs 2x with max to dependA. Pt completes rolling in bed x4 per side with Luisa. Pt will benefit from ongoing skilled OT to address above deficits and return to PLOF. COnt per POC. Second session: Pt agreeable to OT tx and tolerates session well. Pt seen as cotx with PT due to anticipated level of assist required to safely progress mobility. Pt continues to require mod to maxA x2 for sit to stand transfers in parallel bars and to RW. Pt demos improved standing tolerance and tolerates several steps forward in // bars with OT facilitating posture and PT assisting with advancing/supporting BLE. Pt practices scoot pivots transfers with Luisa to CGA and verbal cues for technique. Pt is progressing towards goals but is still below functional baseline and will continue to benefit from ongoing skilled OT to address above deficits. Cont per POC. Activity Tolerance: Patient limited by fatigue;Patient limited by endurance; Patient limited by pain  Discharge Recommendations: Patient would benefit from continued therapy after discharge;Subacute/Skilled Nursing Facility  OT Equipment Recommendations  ADL Assistive Devices: Grab Bars - shower;Transfer Tub Bench  Other: CTA pending progress  Safety Devices  Safety Devices in place: Yes  Type of devices: Chair alarm in place; Left in chair;Call light within reach (Wife present)    Patient Education  Education  Education Given To: Patient; Family  Education Provided: ADL Function;Role of Therapy;Plan of Care;Equipment;Home Exercise Program;Precautions; Safety; Energy Conservation; Family Education  Education Provided Comments: improtance of OOB activities, strengthening exercises for transfers/ADLs, pain management strategies, repositioning strategies  Education Method: Demonstration;Verbal;Teach Back  Barriers to Learning: None  Education Outcome: Verbalized understanding;Demonstrated understanding;Continued education needed    Plan  Plan  Times per Week: 5-7x per week  Times per Day: Daily  Current Treatment Recommendations: Strengthening;ROM;Balance training;Functional mobility training; Endurance training;Pain management; Safety education & training;Patient/Caregiver education & training;Positioning;Self-Care / ADL;Neuromuscular re-education; Wheelchair mobility training;Equipment evaluation, education, & procurement    Goals  Patient Goals   Patient goals : 1 week (4/21)-EXTEND TO 4/27  Short Term Goals  Time Frame for Short term goals: Pt will complete UB dressing with min A- GOAL MET; Pt SBA for UB dressing at EOB  Short Term Goal 1: Pt will complete LB dressing with mod A-NOT MET, maxA x1-2 5/5  Short Term Goal 2: Pt will complete LB bathing with mod A-GOAL MET 4/26  Short Term Goal 3: Pt will complete UB bathing with SBA-GOAL MET 4/26  Short Term Goal 4: Pt will complete 3 grooming task at w/c level-GOAL MET 4/26  Short Term Goal 5: Pt will complete x20 reps of BUE HEP to improve UB strength/endurance for repositioning and UB ADLs- GOAL MET 4/20;  Pt completing x20 BUE AROM  Long Term Goals  Time Frame for Long term goals : 3 weeks (5/05)  Long Term Goal 1: Pt will complete total body dressing with supv-GOAL PARTIALLY MET 5/5, setup/SPV UE, mod-maxA LE  Long Term Goal 2: Pt will complete total body bathing with supv-GOAL PARTIALLY MET 5/5, setup/SPV UE, mod-maxA LE  Long Term Goal 3: Pt will complete 3 grooming tasks at sink with mod I-GOAL PARTIALLY MET 5/5, setup/SPV sitting upright in bed  Long Term Goal 4: Pt will perform functional transfers with mod I-DOWNGRADE TO MODA X1 due to inconsistent progress    Therapy Time   Individual Concurrent Group Co-treatment   Time In 0930     1230   Time Out 1000     1330   Minutes 30     60   Timed Code Treatment Minutes: [address]

## 2022-05-07 NOTE — PROGRESS NOTES
Physical Therapy  Facility/Department: [facility]  Rehabilitation Physical Therapy Treatment Note    NAME: Miriam Stockton  : 1975 (42 y.o.)  MRN: [id number]  CODE STATUS: Full Code    Date of Service: 22       Restrictions:  Restrictions/Precautions: General Precautions; Fall Risk  Position Activity Restriction  Other position/activity restrictions: RUE PICC, sacral wound vac     SUBJECTIVE  Subjective  Subjective: Pt supine in bed on appraoch, reports having BM on arrival  Pain: Pt reports pain at sacral wound but does not provide a pain score                 OBJECTIVE  Cognition  Overall Cognitive Status: Exceptions  Arousal/Alertness: Delayed responses to stimuli  Following Commands: Follows one step commands with repetition; Follows multistep commands with repitition  Attention Span: Attends with cues to redirect  Memory: Decreased recall of precautions;Decreased short term memory  Safety Judgement: Decreased awareness of need for assistance;Decreased awareness of need for safety  Problem Solving: Assistance required to correct errors made;Assistance required to implement solutions;Assistance required to generate solutions;Assistance required to identify errors made  Insights: Decreased awareness of deficits  Initiation: Requires cues for some  Sequencing: Requires cues for some  Orientation  Overall Orientation Status: Within Functional Limits    Functional Mobility  Bridging  Assistance Level: Supervision  Skilled Clinical Factors: x 2 reps, cues for positioning/sequence  Roll Left  Assistance Level: Stand by assist  Skilled Clinical Factors: With BR, multiple bouts  Roll Right  Assistance Level: Stand by assist  Skilled Clinical Factors: with BR, multiple bouts      ASSESSMENT/PROGRESS TOWARDS GOALS       Assessment  Assessment: Pt seen in am for individual PT session. On arrival pt reports he is having BM in brief. Pt reports he did not call for assistance to get to commode or use bedpan.  Pt assisted with mobility and to get cleaned up. Educated on importance of calling for assist to use bedpan or get up to commode especially with current sacral wound and risk of infection with impaired skin integrity. Stool noted in wound vac dressing RN notifed and in to address following session. D/t BM mobility limited to rolling in bed. Pt completes with BR with grossly SBA. Pt will benfit from continued skilled PT in ARU to address above deficits, will continue to progress mobility as tolerated. Activity Tolerance: Patient limited by fatigue;Patient limited by endurance; Patient limited by pain  Discharge Recommendations: Subacute/Skilled Nursing Facility  PT Equipment Recommendations  Equipment Needed: No  Other: defer to SNF    Addendum: 2nd session:  Pt seen in pm for second PT session. Pt seen as co-tx with OT secondary to complexity of medical condition, decreased activity tolerance, strength, balance and high MARIE with mobility. Pt benefits from x 2 skilled hands to provide increased cues and feedback with mobility and improve safety and performance. Session focused on functional mobility, progression of gait in // bars and sit pivot t/f to improve I and decrease burden of care with mobility. Pt demonstrates improved performance in mobility this date with increased gait distances, decreased assist with t/f and improved activity tolerance. Pt does require intermittent rest d/t fatigue.    Bed mobility:  Supine to sit Luisa, HOB elevated, use of BR  Transfers:  EOB to w/c with herb plus (TD d/t equipment)  STS w/c to // bars  Luisa + modA completed x 3 reps, cues for hand placement, positioning and anterior WS  Sit pivot t/f w/c to/from EOM completed x 4 reps CGA + CGA/Luisa (increased time to complete, cues for hand placement, sequence/technique and cues to complete partial stand/squat and pivot hips rather than scoot to prevent shear to wound, cues for adjusting foot placement between scoots)  Partial STS EOM while pushing table/cart modA x 2 to squat position x 3 reps, limited by back pain  STS EOM to SW modA x 2 completed 2 reps, attempted 3rd rep however pt reports fatigue and unable to achieve full stand so returned to seated for safety. PT anterior to pt providing blocking at knees as needed, cue down through distal femur to promote increased LE activity and cues for anterior WS. OT assisting with forward WS, upright trunk and UE positioning. Ambulation:  Pt ambulates x 4' x 2 reps in // bars with grossly Luisa x 2 for balance. Seated rest between bouts. Partial step through pattern, slightly narrowed CRYSTAL, B decreased toe clearance (foot drop), B decreased heel strike, B decreased hip extension in stance, B decreased hip/knee flexion in swing, decreased WS, decreased fluidity. Unsteady with close w/c following for safety. PT anterior to pt providing blocking to knee and decreasing knee hyperextension in stance, assisting to maintain normal CRYSTAL, improve foot placement and WS. OT assisting with trunk positioning, UE management and facilitation of B WS. Distances limited by fatigue and R knee pain on second bout. Balance:  SBA seated EOB  X 1 bout dynamic  // bars Luisa x 2 for balance, RLE step forward/backward to improve strength in SLS for progression of gait activities. Pt demonstrates difficulty with hip extension and knee flexion for backwards step. Pt seated in recliner at end of session with chair alarm in place and all needs within reach.     Goals  Patient Goals   Patient goals : \"to walk again\"  Short Term Goals  Time Frame for Short term goals: 10 days- 4/22/22  Short term goal 1: Pt will complete bed mobility with min A; goal partially met 4/22 - pt completes supine>sit with min A and up to mod A x2 for sit>supine  Short term goal 2: Pt will complete functional transfers with max A x 1 using LRAD; 5/07: GOAL MET, CGA x 2 to CGA + Luisa sit pivot w/c to/from EOM  Short term goal 3: Pt will propel manual w/c 50ft with min A; goal met 4/21 - pt propels manual w/c 75 ft with min A  Short term goal 4: Pt will tolerate gait assessment and appropriate LTG to be set -- 5/07: GOAL MET 3' in // bars Luisa/modA x 2  Long Term Goals  Time Frame for Long term goals : 3 weeks- 5/4/22 - extended to 5/10 dto reflect updated d/c plan  Long term goal 1: Pt will complete bed mobility with supervision; not met 5/6 - pt requires min-mod A x2 for bed mobility  Long term goal 2: Pt will complete functional transfers with min A using LRAD; not met 5/6 -  pt requires mod A x2 for transfers with RW  Long term goal 3: Pt will propel manual w/c 150ft with mod I; not met 5/6 - pt requires supervision for w/c propulsion >150 ft  Long term goal 4: Pt will ambulate x 10' in // bars with Luisa x 2    PLAN OF CARE/SAFETY  Plan  Plan:  minutes of therapy at least 5 out of 7 days a week  Plan weeks: 3 weeks  Current Treatment Recommendations: Strengthening;Balance training;Functional mobility training;Transfer training; Endurance training;Gait training;Neuromuscular re-education;Home exercise program;Safety education & training;Patient/Caregiver education & training; Wheelchair mobility training;Equipment evaluation, education, & procurement;Positioning;Manual Therapy - Soft Tissue Mobilization; Therapeutic activities  Safety Devices  Type of Devices: All annemarie prominences offloaded; All fall risk precautions in place;Call light within reach; Patient at risk for falls;Nurse notified; Bed alarm in place (Pt handoff to OT to complete patricia-care/clothing management, RN notified of stool in wound vac dressing)      Therapy Time   Individual Concurrent Group Co-treatment   Time In 0900         Time Out 0930         Minutes 30           Timed Code Treatment Minutes: 30 Minutes       Second Session Therapy Time:   Individual Concurrent Group Co-treatment   Time In      1230   Time Out      1330   Minutes      60     Timed Code Treatment Minutes: 60 minutes    Total Treatment Minutes:  90 minutes    Rolly Teague PT, DPT C/NDT 05/07/22 at 4:45 PM

## 2022-05-07 NOTE — PROGRESS NOTES
PROGRESS NOTE  S:46 yrs Patient  admitted on 4/13/2022 with Critical illness myopathy [G72.81] . States felt better after BM. Still some nausea but tolerated breakfast.  Concern regarding intake despite antiemetics    Current Hospital Schedued Meds   polyethylene glycol  17 g Oral Daily    NIFEdipine  30 mg Oral BID    metoprolol succinate  50 mg Oral BID    magnesium oxide  400 mg Oral BID    QUEtiapine  100 mg Oral Nightly    QUEtiapine  25 mg Oral BID    mineral oil-hydrophilic petrolatum   Topical Daily    insulin lispro  0-18 Units SubCUTAneous TID WC    insulin lispro  0-9 Units SubCUTAneous Nightly    insulin glargine  25 Units SubCUTAneous Nightly    metoclopramide  10 mg Oral 4x Daily AC & HS    fluticasone  2 spray Each Nostril Nightly    heparin (porcine)  5,000 Units SubCUTAneous 3 times per day    pantoprazole  40 mg Oral BID    PARoxetine  20 mg Oral Daily    multivitamin  1 tablet Oral Daily    tamsulosin  0.4 mg Oral Nightly     Current Hospital IV Meds   dextrose       Current Hospital PRN Meds  tiZANidine, promethazine, senna, haloperidol, QUEtiapine, oxyCODONE, oxyCODONE, prochlorperazine, ondansetron, ondansetron, hydrALAZINE, acetaminophen, diclofenac sodium, glucose, glucagon (rDNA), dextrose, bisacodyl, aluminum & magnesium hydroxide-simethicone, dextrose bolus **OR** dextrose bolus    Exam:   Vitals:    05/07/22 1038   BP:    Pulse:    Resp: 17   Temp:    SpO2:      I/O last 3 completed shifts:   In: 1080 [P.O.:1080]  Out: 3275 [Urine:3275]   General appearance: alert, appears stated age and cooperative  HEENT: PERRLA  Neck: no adenopathy, no carotid bruit, no JVD, supple, symmetrical, trachea midline and thyroid not enlarged, symmetric, no tenderness/mass/nodules  Lungs: clear to auscultation bilaterally  Heart: regular rate and rhythm, S1, S2 normal, no murmur, click, rub or gallop  Abdomen: soft, non-tender; bowel sounds normal; no

## 2022-05-07 NOTE — PROGRESS NOTES
Hospitalist Progress Note      PCP: Alli Magana MD    Date of Admission: 4/13/2022    Chief Complaint: Encephalopathy    Hospital Course: 42 y.o. male with a PMH of Diabetes mellitus type 2 poorly controlled, CHF, history of diabetic ulcer with amputation of the right great toe, Obesity, Neuropathy, CM and history of tobacco abuse who was admitted to [facility] on 1/22/22 with Sepsis/RODRICK/MSSA bacteremia-from left foot infection, Resp failure which progressed to cardiac arrest. He was d/c'd to a LTACH on 3/10/2022. Admitted to ARU on 4/13/2022. Hospitalist consulted due mental status change. Patient reportedly easily agitated and hallucinating. Psych was consulted      Subjective:       Pt worked with therapy today.   Tolerated his meals with no N/V today per RN           Medications:  Reviewed    Infusion Medications    dextrose       Scheduled Medications    polyethylene glycol  17 g Oral Daily    NIFEdipine  30 mg Oral BID    metoprolol succinate  50 mg Oral BID    magnesium oxide  400 mg Oral BID    QUEtiapine  100 mg Oral Nightly    QUEtiapine  25 mg Oral BID    mineral oil-hydrophilic petrolatum   Topical Daily    insulin lispro  0-18 Units SubCUTAneous TID WC    insulin lispro  0-9 Units SubCUTAneous Nightly    insulin glargine  25 Units SubCUTAneous Nightly    metoclopramide  10 mg Oral 4x Daily AC & HS    fluticasone  2 spray Each Nostril Nightly    heparin (porcine)  5,000 Units SubCUTAneous 3 times per day    pantoprazole  40 mg Oral BID    PARoxetine  20 mg Oral Daily    multivitamin  1 tablet Oral Daily    tamsulosin  0.4 mg Oral Nightly     PRN Meds: tiZANidine, promethazine, senna, haloperidol, QUEtiapine, oxyCODONE, oxyCODONE, prochlorperazine, ondansetron, ondansetron, hydrALAZINE, acetaminophen, diclofenac sodium, glucose, glucagon (rDNA), dextrose, bisacodyl, aluminum & magnesium hydroxide-simethicone, dextrose bolus **OR** dextrose bolus      Intake/Output Summary (Last 24 hours) at 5/7/2022 1530  Last data filed at 5/7/2022 1509  Gross per 24 hour   Intake 960 ml   Output 2700 ml   Net -1740 ml       Physical Exam Performed:    /75   Pulse 82   Temp 97.6 °F (36.4 °C) (Oral)   Resp 16   Ht 6' 0.5\" (1.842 m)   Wt 230 lb (104.3 kg)   SpO2 100%   BMI 30.76 kg/m²     General appearance: Obese. Not lethargic today. No apparent distress, appears stated age  [de-identified]:  Normal cephalic, atraumatic without obvious deformity. Conjunctivae/corneas clear. Neck: Supple, with full range of motion. No jugular venous distention. Trachea midline. Respiratory:  Normal respiratory effort. Clear to auscultation, bilaterally without Rales/Wheezes/Rhonchi. Cardiovascular:  Regular rate and rhythm with normal S1/S2 without murmurs, rubs or gallops. Abdomen: Soft, non-tender, non-distended with normal bowel sounds. Musculoskeletal:  No clubbing, cyanosis or edema bilaterally. Skin:  warm and dry   Neurologic:  No overt  focal deficits,  Psychiatric:  awake, not anxious appearing      Labs:   Recent Labs     05/05/22  0801   WBC 8.5   HGB 8.3*   HCT 23.6*        Recent Labs     05/05/22  0801 05/06/22  0643 05/07/22  0715    138 137   K 3.7 3.6 4.2    102 101   CO2 24 25 26   BUN 12 10 11   CREATININE 1.1 1.1 1.1   CALCIUM 10.5 10.3 10.3     No results for input(s): AST, ALT, BILIDIR, BILITOT, ALKPHOS in the last 72 hours. No results for input(s): INR in the last 72 hours. No results for input(s): Javier Hug in the last 72 hours.     Urinalysis:      Lab Results   Component Value Date    NITRU Negative 04/27/2022    WBCUA 21-50 04/27/2022    BACTERIA Rare 01/22/2022    RBCUA 3-4 04/27/2022    BLOODU TRACE-INTACT 04/27/2022    SPECGRAV 1.010 04/27/2022    GLUCOSEU Negative 04/27/2022       Radiology:  CT ABDOMEN PELVIS WO CONTRAST Additional Contrast? Oral   Final Result   There is no evidence of obvious obstruction or evidence of ileus throughout   the abdomen or pelvis. No acute intra-or pelvic abnormality. XR ABDOMEN (KUB) (SINGLE AP VIEW)   Final Result   Possible focal ileus on the left side of the abdomen         CT HEAD WO CONTRAST   Final Result   No acute intracranial abnormality. XR CHEST PORTABLE   Final Result   Low lung volumes, with subtle bilateral perihilar opacities concerning for   multifocal pneumonia. US RENAL COMPLETE   Final Result   Unremarkable ultrasound of the kidneys and urinary bladder. Assessment/Plan:    Active Hospital Problems    Diagnosis     Moderate malnutrition ([facility] [address] [E44.0]     Critical illness myopathy [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 No overt signs of infection- infection ruled out per ID. CT head non acute. Held neurontin, zanaflex, desyrel. Psych recs appreciated. Improving with seroquel- continue med. Mentation improved to baseline. Resolved           RODRICK, improving. Nephrology following     Multiple wounds: continue wound care      HTN- nephro managing. Continue meds       PAF-now in SR, no AC d/t bleeding risk     MSSA bacteremia with left foot abscess, left hand abscess, osteomyelitis, RUL abscess  - finished course of abx     Anemia:  recent GIB s/p embolization of gastroduodenal artery on 2/25 and ex-lap. Monitor hgb and transfuse if <7    N/V :  Improving. Possible focal ileus on left side of abdomen was noted on KUB. Lipase was normal. CT abd/pelvis  non acute - no ileus or obstruction noted. GI assisting. Possible EGD if not improving. Pt seems better today. Continue supportive care with antiemetics PRN,  PPI        Constipation: improved - had BM s/p enema. Encouraged pt to continue bowel regimen ordered          DMII: BG fairly controlled. Continue insulin            DVT Prophylaxis: heparin  Diet: ADULT ORAL NUTRITION SUPPLEMENT; Breakfast, Dinner, Lunch; Diabetic Oral Supplement  ADULT DIET;  Regular; 5 carb choices (75 gm/meal)  ADULT ORAL NUTRITION SUPPLEMENT; Lunch, Dinner; Frozen Oral Supplement  Code Status: Full Code    PT/OT Eval Status: ARU    Dispo -  Per primary team.       Discussed with RN, pt.          Charline Montoya MD

## 2022-05-08 NOTE — PROGRESS NOTES
normal; no masses,  no organomegaly  Extremities: extremities normal, atraumatic, no cyanosis or edema     Labs:  CBC:   No results for input(s): WBC, HGB, HCT, MCV, PLT in the last 72 hours. BMP:   Recent Labs     05/06/22  0643 05/07/22  0715    137   K 3.6 4.2    101   CO2 25 26   BUN 10 11   CREATININE 1.1 1.1     LIVER PROFILE: No results for input(s): AST, ALT, LIPASE, PROT, BILIDIR, BILITOT, ALKPHOS in the last 72 hours. Invalid input(s): AMYLASE,  ALB  PT/INR: No results for input(s): INR in the last 72 hours. Invalid input(s): PT    IMAGING:  No results found. Hospital Problems           Last Modified POA    * (Principal) Critical illness myopathy 4/13/2022 Yes    Moderate malnutrition (HCC) (Chronic) 4/14/2022 Yes         Impression:     41 yo male with nausea and constipation    Recommendation:  1. Continue current management  2. Consider EGD if persistent symptoms and appetite does not improve.         Gisela Dickey MD  9:10 AM 5/8/2022            [address] [place] [address]    [address]      [address] [place] [address]     Phone: [phone]     Fax: [phone]

## 2022-05-08 NOTE — PROGRESS NOTES
Hospitalist Progress Note      PCP: Zoraida Hill MD    Date of Admission: 4/13/2022    Chief Complaint: Encephalopathy    Hospital Course: 42 y.o. male with a PMH of Diabetes mellitus type 2 poorly controlled, CHF, history of diabetic ulcer with amputation of the right great toe, Obesity, Neuropathy, CM and history of tobacco abuse who was admitted to [facility] on 1/22/22 with Sepsis/RODRICK/MSSA bacteremia-from left foot infection, Resp failure which progressed to cardiac arrest. He was d/c'd to a LTACH on 3/10/2022. Admitted to ARU on 4/13/2022. Hospitalist consulted due mental status change. Patient reportedly easily agitated and hallucinating. Psych was consulted      Subjective:       Pt denies any physical compliant today with no significant N/V for the past 2 days.          Medications:  Reviewed    Infusion Medications    dextrose       Scheduled Medications    polyethylene glycol  17 g Oral Daily    NIFEdipine  30 mg Oral BID    metoprolol succinate  50 mg Oral BID    magnesium oxide  400 mg Oral BID    QUEtiapine  100 mg Oral Nightly    QUEtiapine  25 mg Oral BID    mineral oil-hydrophilic petrolatum   Topical Daily    insulin lispro  0-18 Units SubCUTAneous TID WC    insulin lispro  0-9 Units SubCUTAneous Nightly    insulin glargine  25 Units SubCUTAneous Nightly    metoclopramide  10 mg Oral 4x Daily AC & HS    fluticasone  2 spray Each Nostril Nightly    heparin (porcine)  5,000 Units SubCUTAneous 3 times per day    pantoprazole  40 mg Oral BID    PARoxetine  20 mg Oral Daily    multivitamin  1 tablet Oral Daily    tamsulosin  0.4 mg Oral Nightly     PRN Meds: tiZANidine, promethazine, senna, haloperidol, QUEtiapine, oxyCODONE, oxyCODONE, prochlorperazine, ondansetron, ondansetron, hydrALAZINE, acetaminophen, diclofenac sodium, glucose, glucagon (rDNA), dextrose, bisacodyl, aluminum & magnesium hydroxide-simethicone, dextrose bolus **OR** dextrose bolus      Intake/Output Summary (Last 24 hours) at 5/8/2022 0742  Last data filed at 5/8/2022 0630  Gross per 24 hour   Intake 1080 ml   Output 2250 ml   Net -1170 ml       Physical Exam Performed:    /82   Pulse 89   Temp 98.4 °F (36.9 °C) (Oral)   Resp 16   Ht 6' 0.5\" (1.842 m)   Wt 230 lb (104.3 kg)   SpO2 97%   BMI 30.76 kg/m²     General appearance: Obese. Not lethargic today. No apparent distress, appears stated age  [de-identified]:  Normal cephalic, atraumatic without obvious deformity. Conjunctivae/corneas clear. Neck: Supple, with full range of motion. No jugular venous distention. Trachea midline. Respiratory:  Normal respiratory effort. Clear to auscultation, bilaterally without Rales/Wheezes/Rhonchi. Cardiovascular:  Regular rate and rhythm with normal S1/S2 without murmurs, rubs or gallops. Abdomen: Soft, non-tender, non-distended with normal bowel sounds. Musculoskeletal:  No clubbing, cyanosis or edema bilaterally. Skin:  warm and dry   Neurologic:  No overt  focal deficits,  Psychiatric:  awake, not anxious appearing      Labs:   Recent Labs     05/05/22  0801   WBC 8.5   HGB 8.3*   HCT 23.6*        Recent Labs     05/05/22  0801 05/06/22  0643 05/07/22  0715    138 137   K 3.7 3.6 4.2    102 101   CO2 24 25 26   BUN 12 10 11   CREATININE 1.1 1.1 1.1   CALCIUM 10.5 10.3 10.3     No results for input(s): AST, ALT, BILIDIR, BILITOT, ALKPHOS in the last 72 hours. No results for input(s): INR in the last 72 hours. No results for input(s): Mukund Snowball in the last 72 hours.     Urinalysis:      Lab Results   Component Value Date    NITRU Negative 04/27/2022    WBCUA 21-50 04/27/2022    BACTERIA Rare 01/22/2022    RBCUA 3-4 04/27/2022    BLOODU TRACE-INTACT 04/27/2022    SPECGRAV 1.010 04/27/2022    GLUCOSEU Negative 04/27/2022       Radiology:  CT ABDOMEN PELVIS WO CONTRAST Additional Contrast? Oral   Final Result   There is no evidence of obvious obstruction or evidence of ileus throughout   the abdomen or pelvis. No acute intra-or pelvic abnormality. XR ABDOMEN (KUB) (SINGLE AP VIEW)   Final Result   Possible focal ileus on the left side of the abdomen         CT HEAD WO CONTRAST   Final Result   No acute intracranial abnormality. XR CHEST PORTABLE   Final Result   Low lung volumes, with subtle bilateral perihilar opacities concerning for   multifocal pneumonia. US RENAL COMPLETE   Final Result   Unremarkable ultrasound of the kidneys and urinary bladder. Assessment/Plan:    Active Hospital Problems    Diagnosis     Moderate malnutrition ([address] [E44.0]     Critical illness myopathy [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 No overt signs of infection- infection ruled out per ID. CT head non acute. Held neurontin, zanaflex, desyrel. Psych recs appreciated. Started on seroquel with improvement- continue med. Mentation improved to baseline. Resolved           RODRICK: likely prerenal. Improved with IVF. Cr normalized. Nephro signed off.     Multiple wounds: continue wound care      HTN-  Continue meds with hold parameters.        PAF-now in SR, no AC d/t bleeding risk     MSSA bacteremia with left foot abscess, left hand abscess, osteomyelitis, RUL abscess  - finished course of abx     Anemia:  recent GIB s/p embolization of gastroduodenal artery on 2/25 and ex-lap. Monitor hgb and transfuse if <7    N/V :  Improving. Possible focal ileus on left side of abdomen was noted on KUB. Lipase was normal. CT abd/pelvis  non acute - no ileus or obstruction noted. GI assisting. Possible EGD if not improving. Pt tolerating diet with no significant N/v for past 2 days . Continue supportive care with antiemetics PRN,  PPI        Constipation:  s/p enema and has been having BM's. Seems resolved. Continue bowel regimen. DMII: BG fairly controlled.   Continue insulin            DVT Prophylaxis: heparin  Diet: ADULT ORAL NUTRITION SUPPLEMENT; Breakfast, Dinner, Lunch; Diabetic Oral Supplement  ADULT DIET;  Regular; 5 carb choices (75 gm/meal)  ADULT ORAL NUTRITION SUPPLEMENT; Lunch, Dinner; Frozen Oral Supplement  Code Status: Full Code    PT/OT Eval Status: ARU    Dispo -  Per primary team.                 Estuardo Tariq MD

## 2022-05-09 NOTE — PLAN OF CARE
Problem: Pain:  Goal: Pain level will decrease  Description: Pain level will decrease  Outcome: Progressing     Problem: Pain:  Goal: Control of acute pain  Description: Control of acute pain  Outcome: Progressing     Problem: Falls - Risk of:  Goal: Will remain free from falls  Description: Will remain free from falls  Outcome: Progressing     Problem: Skin Integrity:  Goal: Will show no infection signs and symptoms  Description: Will show no infection signs and symptoms  Outcome: Progressing     Problem: Skin Integrity:  Goal: Absence of new skin breakdown  Description: Absence of new skin breakdown  Outcome: Progressing

## 2022-05-09 NOTE — PROGRESS NOTES
[facility] Nurse  Follow-up Progress Note       NAME:  Jayme Wan  MEDICAL RECORD NUMBER:  [id number]  AGE:  42 y.o. GENDER:  male  :  1975  TODAY'S DATE:  2022    Subjective: Yes, I don't mind holding lunch for minute. Yep, I'd like some pain medication. Spouse in room with patient at bedside. Wound Identification:  Wound Type: traumatic left foot.  Healing stage 4 pressure injury coccyx/sacrum.  Left hand posterior old burn area open. Contributing Factors:  edema, diabetes, chronic pressure, decreased mobility, shear force, obesity, incontinence of stool and incontinence of urine    Patient Goal of Care:  [x] Wound Healing  [] Odor Control  [] Palliative Care  [x] Pain Control   [] Other:     Objective:Moist to moist dressing to sacrum, attached with tape. Hydrocolloid over Aglinate AG on left dorsal foot wound. Mepilex on left hand. /80   Pulse 81   Temp 97.4 °F (36.3 °C) (Axillary)   Resp 16   Ht 6' 0.5\" (1.842 m)   Wt 230 lb (104.3 kg)   SpO2 100%   BMI 30.76 kg/m²   Shimon Risk Score: Shimon Scale Score: 14  Assessment:Left foot moist with slough present in wound. Edges attached. Sacrum pink/red, with rising granulation. Left ischium raised area pink in color. Measurements:  Negative Pressure Wound Therapy Coccyx (Active)   $ Standard NPWT <=50 sq cm PER TX $ Yes 22 1234   $ Standard NPWT >50 sq cm PER TX $ Yes 22 1605   Wound Type Pressure ulcer: Stage IV;Surgical 22 1234   Unit Type [employer] [id number] 22 1234   Dressing Type Black Foam 22 1234   Number of pieces used 1 22 1234   Number of pieces removed 1 22 1234   Cycle Continuous 22 1234   Target Pressure (mmHg) 125 22 1234   Intensity 1 22 1234   Canister changed?  Yes 22 1234   Dressing Status New dressing applied 22 1234   Dressing Changed Changed/New 22 1234   Drainage Amount Small 05/09/22 1234   Drainage Description Serosanguinous 05/09/22 1234   Dressing Change Due 05/11/22 05/09/22 1234   Output (ml) 50 ml 04/22/22 1704   Wound Assessment Red;Pink;Granulation tissue 05/09/22 1234   Shape oval 05/09/22 1234   Odor None 05/09/22 1234   Number of days: 96       Wound 01/24/22 Foot Left;Dorsal I & D to left dorsal foot by Dr. Oriana Joseph, surgical wound (Active)   Number of days: 105       Wound 01/30/22 Sacrum SDTI (Active)   Wound Image   05/09/22 1234   Wound Etiology Pressure Stage 4 05/09/22 1234   Dressing Status New dressing applied 05/09/22 1234   Wound Cleansed Cleansed with saline 05/09/22 1234   Dressing/Treatment Barrier film;Hydrocolloid; Negative pressure wound therapy 05/09/22 1234   Offloading for Diabetic Foot Ulcers Offloading ordered 05/09/22 1234   Dressing Change Due 05/11/22 05/09/22 1234   Wound Length (cm) 7.5 cm 05/09/22 1234   Wound Width (cm) 4.5 cm 05/09/22 1234   Wound Depth (cm) 0.5 cm 05/09/22 1234   Wound Surface Area (cm^2) 33.75 cm^2 05/09/22 1234   Change in Wound Size % (l*w) -68.75 05/09/22 1234   Wound Volume (cm^3) 16.875 cm^3 05/09/22 1234   Wound Healing % -24 05/09/22 1234   Distance Tunneling (cm) 0 cm 05/09/22 1234   Tunneling Position ___ O'Clock 0 05/09/22 1234   Undermining Starts ___ O'Clock 0 05/09/22 1234   Undermining Ends___ O'Clock 0 05/09/22 1234   Undermining Maxium Distance (cm) 0 05/09/22 1234   Wound Assessment Pink/red;Granulation tissue 05/09/22 1234   Drainage Amount Small 05/09/22 1234   Drainage Description Serosanguinous 05/09/22 1234   Odor None 05/09/22 1234   Shanita-wound Assessment Blanchable erythema 05/09/22 1234   Margins Attached edges; Defined edges 05/09/22 1234   Wound Thickness Description not for Pressure Injury Full thickness 05/09/22 1234   Number of days: 99    Sacrum           Wound 04/29/22 Hand Left;Posterior (Active)   Wound Image   05/09/22 1234   Wound Etiology Other 05/09/22 1234   Dressing Status New dressing applied 05/09/22 1234   Wound Cleansed Cleansed with saline 05/09/22 1234   Dressing/Treatment Alginate with Ag;Hydrocolloid 05/09/22 1234   Offloading for Diabetic Foot Ulcers Offloading ordered 05/09/22 1234   Dressing Change Due 05/11/22 05/09/22 1234   Wound Length (cm) 1.5 cm 05/09/22 1234   Wound Width (cm) 1.7 cm 05/09/22 1234   Wound Depth (cm) 0.1 cm 05/09/22 1234   Wound Surface Area (cm^2) 2.55 cm^2 05/09/22 1234   Change in Wound Size % (l*w) 71.67 05/09/22 1234   Wound Volume (cm^3) 0.255 cm^3 05/09/22 1234   Wound Healing % 49 05/09/22 1234   Distance Tunneling (cm) 0 cm 05/06/22 1317   Tunneling Position ___ O'Clock 0 05/06/22 1317   Undermining Starts ___ O'Clock 0 05/06/22 1317   Undermining Ends___ O'Clock 0 05/06/22 1317   Undermining Maxium Distance (cm) 0 05/06/22 1317   Wound Assessment Slough 05/09/22 1234   Drainage Amount Scant 05/09/22 1234   Drainage Description Serous 05/09/22 1234   Odor None 05/09/22 1234   Shanita-wound Assessment Blanchable erythema 05/09/22 1234   Margins Attached edges 05/09/22 1234   Wound Thickness Description not for Pressure Injury Partial thickness 05/09/22 1234   Number of days: 10     Left hand           Incision 03/08/22 Foot Anterior; Left (Active)   Wound Image   05/09/22 1234   Dressing Status New dressing applied 05/09/22 1234   Dressing Change Due 05/11/22 05/09/22 1234   Incision Cleansed Cleansed with saline 05/09/22 1234   Dressing/Treatment Moist to moist 05/09/22 1234   Incision Length (cm) 2 05/09/22 1234   Incision Width (cm) 6 cm 05/09/22 1234   Incision Depth (cm) 0.1 cm 05/09/22 1234   Margins Approximated 05/06/22 1317   Incision Assessment Other (Comment) 05/09/22 1234   Drainage Amount Moderate 05/09/22 1234   Drainage Description Serosanguinous 05/09/22 1234   Odor None 05/09/22 1234   Shanita-incision Assessment Blanchable erythema 05/09/22 1234   Number of days: 62   Left foot      Response to treatment:  With complaints of pain. Pain Assessment:  Severity:  8 / 10  Quality of pain: aching  Wound Pain Timing/Severity: intermittent  Premedicated: No  Provided prn medication during dressing change. Plan:   Plan of Care: Wound 01/30/22 Sacrum SDTI-Dressing/Treatment: Marnee Vera film,Hydrocolloid,Negative pressure wound therapy  [REMOVED] Wound 02/14/22 Head Left; Lower; Posterior Friction/pressure wound-unstageable. 4/14/22 healed now, will complete LDA per [facility]OCN-Dressing/Treatment: Open to air  [REMOVED] Wound 01/24/22 Hand Left;Dorsal-Dressing/Treatment: Open to air  Wound 04/29/22 Hand Left;Posterior-Dressing/Treatment: Alginate with Ag,Hydrocolloid    Recommendations;   Current dressing removed.  Cleaned wounds with normal saline.  One black sponge placed in right and mid coccyx. Left buttocks covered with Alginate AG and duoderm dressing.  Tracked to left hip. Connected to 125 mmHg low continuous suction.  Wound care to continue to follow.  Call Wound care for problems with seal at [phone] or call [phone] and leave message. Thanks     Recommend:   Clean feet and legs with warm soapy water, foamy cleanser or bath wipes daily.  Pat dry.  Apply aquapor ointment to feet and legs daily.   Clean left foot wound with normal saline. Apply moist to moist dressing. Change BID.  Cleanse left posterior hand with normal saline, apply alginate Ag then hydrocolloid. Change with VAC dressing M-W-F. Continue NPWT dressing to coccyx wound change 3 times a week      Specialty Bed Required : Yes [place]  [x] Low Air Loss   [x] Pressure Redistribution  [] Fluid Immersion  [] Bariatric  [] Total Pressure Relief  [] Other:     Current Diet: ADULT ORAL NUTRITION SUPPLEMENT; Breakfast, Dinner, Lunch; Diabetic Oral Supplement  ADULT DIET;  Regular; 5 carb choices (75 gm/meal)  ADULT ORAL NUTRITION SUPPLEMENT; Lunch, Dinner; Frozen Oral Supplement  Dietician consult:  Yes    Discharge Plan:  Placement for patient upon discharge: skilled nursing Patient appropriate for Outpatient [address]: Yes    Referrals:  [x]     following  [] [facility]  [] Supplies  [] Other    Patient/Caregiver Teaching: To reposition from side to side to prevent pressure areas from forming.   Level of patient/caregiver understanding able to:   [] Indicates understanding       [] Needs reinforcement  [] Unsuccessful      [x] Verbal Understanding  [] Demonstrated understanding       [] No evidence of learning  [] Refused teaching         [] N/A       Electronically signed by Shari Jenkins RN, on 5/9/2022 at 12:56 PM

## 2022-05-09 NOTE — PROGRESS NOTES
Physical Therapy  Facility/Department: [facility]  Rehabilitation Physical Therapy Treatment Note    NAME: Sam Hickey  : 1975 (42 y.o.)  MRN: [id number]  CODE STATUS: Full Code    Date of Service: 22       Restrictions:  Restrictions/Precautions: General Precautions; Fall Risk  Position Activity Restriction  Other position/activity restrictions: RUE PICC, sacral wound vac     SUBJECTIVE  Subjective  Subjective: Pt supine in bed on approach, agreeable to PT tx  Pain: Pt reports pain at sacral wound but does not provide a pain score                 OBJECTIVE  Orientation  Overall Orientation Status: Within Functional Limits    Functional Mobility  Roll Left  Assistance Level: Supervision  Skilled Clinical Factors: Completed without BR, HOB flat  Roll Right  Assistance Level: Supervision  Skilled Clinical Factors: completed without BR, HOB flat  Supine to Sit  Assistance Level: Minimal assistance  Skilled Clinical Factors: Completed without BR, HOB flat, Luisa at trunk, increased time to complete  Balance  Sitting Balance: Supervision  Transfers  Additional Factors: Hand placement cues; Increased time to complete;Verbal cues  Device: Lift equipment  Sit to Stand  Assistance Level: Dependent  Skilled Clinical Factors: w/c to recliner with herb plus TD d/t lift equipment  Bed To/From Chair  Technique: Sit pivot  Assistance Level: Contact guard assist;Minimal assistance  Skilled Clinical Factors: Sit pivot t/f EOB to w/c grossly CGA to Luisa x 1, cues for sequence, increased time to complete. Cues to increase clearance of buttocks to decrease shear force at wound, intermittent Luisa for anterior WS and increasing hip clearance  Car Transfer  Assistance Level: Moderate assistance  Skilled Clinical Factors: Completed with slide board, cues for sequence/technique, hand placement, anterior WS and completing hip clearance to prevent shear force at wound. Grossly Luisa to modA for t/f and Luisa to get BLE into car. Limited by pain once in car seat at sacral wound      Environmental Mobility  Wheelchair  Surface: Level surface  Device: Standard wheelchair  Additional Factors: Increased time to complete  Assistance Required to Manage Parts: Left armrest;Right armrest;Brakes (cues without physical assistance)  Assistance Level for Propulsion: Supervision  Propulsion Method: Bilateral upper extremities  Propulsion Quality: Slow velocity; Short strokes; Decreased fluidity  Propulsion Distance: 160'  Skilled Clinical Factors: Completes with multiple turns                    ASSESSMENT/PROGRESS TOWARDS GOALS       Assessment  Assessment: Pt seen in am for individual PT session. Pt agreeable, does endorse pain at sacral wound and pain with car t/f with use of slideboard. Pt demonstrates rolling with S, supine to sit with Luisa at trunk with HOB flat and without BR. Pt requires CGA to Luisa for sit pivot and modA for car t/f with slideboard. Pt educated on t/f on increased hip clearance to decrease shear force over wound. Pt is limited by pain, decreased activity tolerance and fatigue. Pt will benefit from continued skilled PT in ARU to address above deficits, will continue to progress mobility as tolerated. Activity Tolerance: Patient limited by fatigue;Patient limited by endurance; Patient limited by pain  Discharge Recommendations: Subacute/Skilled Nursing Facility  PT Equipment Recommendations  Equipment Needed: No  Other: defer to SNF      Addendum Second Session Sofya Dahl PT)  Assessment: Pt seen for second session as co-treat with OT to optimize progression of mobility safely given pt current weakness and MARIE for STS transfers and standing balance. MD present at start of session and discussing d/c planning with pt. Pt continues to demo good progress in therapy this date; however, pt continues to require physical assistance for all transfers, bed mobility, and is dependent for gait in // bars.  Pt unsure if w/c would fit through doorway. Pt's wife works. Pt states she could take off of work for 1 week. Pt will require 24/7 assistance at d/c. With current MARIE for transfers and toileting as well as decreased caregiver assistance, pt would benefit from continued skilled PT in IP Rehab vs. SNF setting. Pt continuing to make functional progress in this setting. If pt d/c to home from this setting, would recommend hospital bed, slideboard for car transfers, RW for standing transfers to don pants, 22\" width w/c with swing-away armrests and anti-tippers, a ramp to enter home (2 DANIE), 24/7 assistance, home health aide, and home PT at this time; however, recommend pt continue therapy in rehab setting to progress safety and functional mobility prior to d/c. Vitals: 126/80, 81 bpm, 100%    Bed mobility: Pt up in recliner at start of session. Sit>supine completed with mod A. Pt requires max A x2 to scoot toward HOB in supine for repositioning. Transfers: Pt completes first STS from recliner to [facility] with mod A x2. Pt stood >1 min at RW with CGA for balance/safety while OT assisting with brief change and pericare. Pt fatigues and requires seated rest break. Upon second stand from recliner, pt requires max A x1 with RW. Pt requires assistance to pull up shorts. Following seated rest, pt completes sit pivot recliner>w/c toward L with min A x1 + CGA x1. After therapists' assistance aligning w/c with toilet, pt completes sit pivot transfers w/c<>toilet with mod A toward R and min A toward L with use of drop arm BSC over toilet and cues with increased time. Pt completes additional  // bars with mod A. Pt completes sit pivot toward R w/c>bed with mod A and cueing d/t uphill transfer given bed higher than w/c after therapists align w/c and lock brakes.     Gait: Pt ambulates 8 ft in // bars with min A x1 to assist with foot placement and manage CRYSTAL with PT's foot between pt's to widen CRYSTAL and prevent scissor gait + CGA x1 for safety/balance at trunk from OT with w/c follow (dependent overall). Pt demos slow emili, short step length with improved activity tolerance, weight shift, and initiation of swing phase. Pt requires prolonged rest break following d/t fatigue. Goals  Patient Goals   Patient goals : \"to walk again\"  Short Term Goals  Time Frame for Short term goals: 10 days- 4/22/22  Short term goal 1: Pt will complete bed mobility with min A; goal partially met 4/22 - pt completes supine>sit with min A and up to mod A x2 for sit>supine  Short term goal 2: Pt will complete functional transfers with max A x 1 using LRAD; 5/07: GOAL MET, CGA x 2 to CGA + Luisa sit pivot w/c to/from EOM  Short term goal 3: Pt will propel manual w/c 50ft with min A; goal met 4/21 - pt propels manual w/c 75 ft with min A  Short term goal 4: Pt will tolerate gait assessment and appropriate LTG to be set -- 5/07: GOAL MET 3' in // bars Luisa/modA x 2  Long Term Goals  Time Frame for Long term goals : 3 weeks- 5/4/22 - extended to 5/10 dto reflect updated d/c plan  Long term goal 1: Pt will complete bed mobility with supervision; -- GOAL NOT MET 5/09: S for rolling modA supine to sit  Long term goal 2: Pt will complete functional transfers with min A using LRAD -- GOAL MET 5/09: CGA/Luisa sit pivot t/f, modA x 2 STS with RW  Long term goal 3: Pt will propel manual w/c 150ft with mod I -- GOAL NOT MET 5/09: pt requires supervision for w/c propulsion >150 ft  Long term goal 4: Pt will ambulate x 10' in // bars with Luisa x 2 --  GOAL NOT MET 5/09: x 5' in // bars TD    PLAN OF CARE/SAFETY  Plan  Plan:  minutes of therapy at least 5 out of 7 days a week  Plan weeks: 3 weeks  Current Treatment Recommendations: Strengthening;Balance training;Functional mobility training;Transfer training; Endurance training;Gait training;Neuromuscular re-education;Home exercise program;Safety education & training;Patient/Caregiver education & training; Wheelchair mobility training;Equipment evaluation, education, & procurement;Positioning;Manual Therapy - Soft Tissue Mobilization; Therapeutic activities  Safety Devices  Type of Devices: All annemarie prominences offloaded; All fall risk precautions in place;Call light within reach; Patient at risk for falls;Nurse notified; Left in chair;Chair alarm in place;Gait belt; Heels elevated for pressure relief      Therapy Time   Individual Concurrent Group Co-treatment   Time In 0830         Time Out 0900         Minutes 30           Timed Code Treatment Minutes: 30 Minutes     Second Session Therapy Time:   Individual Concurrent Group Co-treatment   Time In      0930   Time Out      1030   Minutes      60     Timed Code Treatment Minutes:  90 mins total    August Paulino PT, DPT C/NDT 05/09/22 at 9:51 AM

## 2022-05-09 NOTE — CARE COORDINATION
MATTHEW spoke with Scott Valdivia from [place] [address] via telephone r/t acceptance for the facility. Scott Valdivia stated that they do not have a isolation bed available d/t patient having a hx of MDRO. MATTHEW acknowledged. Shruthi Winters, RN     [id number] MATTHEW spoke with Annika from [facility] via telephone for a referral. Mariah Valenzuela will pull documents from epic to review and update writer.  Shruthi Winters RN

## 2022-05-09 NOTE — CARE COORDINATION
Clinicals faxed to insurance for .   Gisel Reyna MPH, RRT  Clinical Liaison [address] [place] [address]  R)[phone]  (F)[phone]   Electronically signed by Gisel Reyna on 5/9/2022 at 10:45 AM

## 2022-05-09 NOTE — PROGRESS NOTES
Occupational Therapy  Facility/Department: [address] [place] [address]  Rehabilitation Occupational Therapy Daily Treatment Note    Date: 22  Patient Name: Quincy Shah       Room: 3815/5230-16  MRN: [id number]  Account: [de-identified]   : 1975  (41 y.o.) Gender: male                Treatment Diagnosis: functional declined in ADLs due to critical illness myopathy   Past Medical History:  has a past medical history of Abscess of left foot, Abscess of left hand, Acute encephalopathy, Acute hypoxemic respiratory failure ([address] Acute osteomyelitis of left hand ([address] Acute osteomyelitis of right hallux ([address] Cardiopulmonary arrest ([address] Cellulitis of left foot, CHF (congestive heart failure) ([address] Chronic osteomyelitis of left foot ([address] Closed displaced fracture of distal phalanx of right little finger, Clostridium difficile infection, Diabetic ulcer of left forefoot associated with type 2 diabetes mellitus, with necrosis of bone ([address] Diabetic ulcer of right great toe associated with type 2 diabetes mellitus, with necrosis of bone ([address] Enterococcal infection, 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 Past Surgical History:   has a past surgical history that includes Tonsillectomy; [place] tooth extraction; Knee arthroscopy (Left, 08/15/2013); Toe amputation (Right, 2019); other surgical history (Left, 2020); Toe amputation (Left, 2020); Toe amputation (Left, 10/22/2020);  Foot Debridement (Left, 02/01/2022); Arm Surgery (Left, 02/05/2022); IR NONTUNNELED VASCULAR CATHETER > 5 YEARS (02/11/2022); Upper gastrointestinal endoscopy (N/A, 02/17/2022); IR EMBOLIZATION HEMORRHAGE (Right, 02/25/2022); Upper gastrointestinal endoscopy (N/A, 2/27/2022); laparotomy (N/A, 2/27/2022); Foot Debridement (Left, 3/8/2022); Cholecystectomy; Dilatation, esophagus; and Endoscopy, colon, diagnostic. Restrictions  Restrictions/Precautions: General Precautions; Fall Risk  Other position/activity restrictions: RUE PICC, sacral wound vac    Subjective  Subjective: Pt reports he is a little dizzy but vitals WFL, no pain reported. Restrictions/Precautions: General Precautions; Fall Risk           Objective               ADL  Feeding  Assistance Level: Increased time to complete;Set-up; Supervision  Skilled Clinical Factors: sitting upright in bed  Grooming/Oral Hygiene  Assistance Level: Increased time to complete;Supervision;Set-up  Skilled Clinical Factors: sitting upright at the edge of bed to wash face/hands, brush hair and teeth with increased time to manage dexterity to open containers. Upper Extremity Bathing  Assistance Level: Supervision;Set-up  Skilled Clinical Factors: sitting EOB to use bathing wipes for UB and washcloth for hair with setup and increased time  Lower Extremity Bathing  Assistance Level: Increased time to complete;Minimal assistance  Skilled Clinical Factors: sitting upright in bed, used bathing wipes on front area setup and upper BLE, max assist with bathing buttocks and B lower legs. (7/10 areas ind'ly)  Upper Extremity Dressing  Assistance Level: Set-up; Increased time to complete;Verbal cues; Supervision  Lower Extremity Dressing  Assistance Level: Increased time to complete;Maximum assistance  Skilled Clinical Factors: max A to doff pants/brief seated BSC  Putting On/Taking Off Footwear  Assistance Level: Dependent  Skilled Clinical Factors: depA for donning/doffing socks  Toileting  Assistance Level: Dependent  Skilled Clinical Factors: rea catheter, total A hygiene  Toilet Transfers  Equipment: Beside commode  Additional Factors: Increased time to complete;Cues for hand placement  Assistance Level: Moderate assistance  Skilled Clinical Factors: sitting on BSC over toilet, sit pivot transfer  Tub/Shower Transfers  Type: Tub  Transfer From: Wheelchair  Transfer To: Tub transfer bench  Additional Factors: Set-up; With handrails; Increased time to complete;Cues for hand placement  Assistance Level: Moderate assistance  Skilled Clinical Factors: sit pivot     Sit to Supine  Assistance Level: Modified independent  Supine to Sit  Assistance Level: Moderate assistance  Scooting  Assistance Level: Minimal assistance  Skilled Clinical Factors: scoot pivot t/f to/from w/c/mat table; cues for feet placement, cues to lift buttocks slightly so not sliding on wound  Transfers  Surface: To chair with arms;From mat; To mat;From bed; Wheelchair  Additional Factors: Increased time to complete; Mat raised; With handrails;Verbal cues; Hand placement cues  Sit to Stand  Assistance Level: Moderate assistance;Maximum assistance  Skilled Clinical Factors: mod A-max A x1 person, dependent on fatigue level. Stand to Sit  Assistance Level: Moderate assistance  Skilled Clinical Factors: modA x1 over multiple trials  Sit Pivot  Assistance Level: Moderate assistance;Minimal assistance  Skilled Clinical Factors: min A to mod A for TTB, BSC, and w/c>bed transfer         Assessment  Assessment  Assessment: First Session: pt in supine in bed, completed UB sponge bath and dressing and grooming seated EOB. Secondary to wound care, full shower not completed. Pt set-up for UB ADLs adn grooming, increased time and SBA for sitting balance. Second session: Pt supine in bed completing cotreat with PT for safely progressing functional mobility and transfers.  Pt improved mobility, completing sit pivot min-mod A of 1 and stands with mod-max A of 1 person. Pt requiring max A to dependency for LB ADLs. Completed mobility in parallel bars with OT facil trunk. Pt would benefit from SNF at d/c. DME TBD with need for grab bars, BSC with padded seat or wide surface and drop arms, and TTB. Activity Tolerance: Patient limited by fatigue;Patient limited by endurance; Patient limited by pain  Discharge Recommendations: Patient would benefit from continued therapy after discharge;Subacute/Skilled Nursing Facility  Factors Affecting Discharge: Currently pt is requiring increased assistance for ADLs and transfers. Pt currently has not had family assist with family training. Wife works and is not able to assist during day. Pt would require 24 hour assistance, use of AE, and heavy assistance with ADLs and mobility. Pt would benefit from continued therapy before return home. OT Equipment Recommendations  Equipment Needed: Yes  Mobility Devices: Hospital Bed  ADL Assistive Devices: Grab Bars - shower;Transfer Tub Bench;Long-handled Sponge; Toileting - Heavy Duty Commode  Other: bariatric BSC or BSC with padded seat. BSC needs drop arms. Safety Devices  Safety Devices in place: Yes  Type of devices: Left in chair;Call light within reach; Bed alarm in place; Patient at risk for falls; Left in bed (Wife present)    Patient Education  Education  Education Given To: Patient  Education Provided: ADL Function;Role of Therapy;Plan of Care;Equipment;Home Exercise Program;Precautions; Safety; Energy Conservation; Family Education  Education Provided Comments: improtance of OOB activities,repositioning strategies, skin integrity, Fx transfers with sit pivot,  Education Method: Demonstration;Verbal;Teach Back  Barriers to Learning: None  Education Outcome: Verbalized understanding;Demonstrated understanding;Continued education needed    Plan  Plan  Times per Week: 5-7x per week  Times per Day: Daily  Current Treatment Recommendations: Strengthening;ROM;Balance training;Functional mobility training; Endurance training;Pain management; Safety education & training;Patient/Caregiver education & training;Positioning;Self-Care / ADL;Neuromuscular re-education; Wheelchair mobility training;Equipment evaluation, education, & procurement    Goals  Patient Goals   Patient goals : 1 week (4/21)-EXTEND TO 4/27  Short Term Goals  Time Frame for Short term goals: Pt will complete UB dressing with min A- GOAL MET; Pt SBA for UB dressing at EOB  Short Term Goal 1: Pt will complete LB dressing with mod A-NOT MET, max A x1 5/9  Short Term Goal 2: Pt will complete LB bathing with mod A-GOAL MET 4/26  Short Term Goal 3: Pt will complete UB bathing with SBA-GOAL MET 4/26  Short Term Goal 4: Pt will complete 3 grooming task at w/c level-GOAL MET 4/26  Short Term Goal 5: Pt will complete x20 reps of BUE HEP to improve UB strength/endurance for repositioning and UB ADLs- GOAL MET 4/20; Pt completing x20 BUE AROM  Long Term Goals  Time Frame for Long term goals : 3 weeks (5/05)  Long Term Goal 1: Pt will complete total body dressing with supv-GOAL PARTIALLY MET 5/5, setup/SPV UE, mod-maxA LE  Long Term Goal 2: Pt will complete total body bathing with supv-GOAL PARTIALLY MET 5/5, setup/SPV UE, mod-maxA LE  Long Term Goal 3: Pt will complete 3 grooming tasks at sink with mod I-GOAL PARTIALLY MET 5/5, setup/SPV sitting upright in bed  Long Term Goal 4: Pt will perform functional transfers with mod I-DOWNGRADE TO MODA X1 due to inconsistent progress.  PARTIALLY MET_ mod-max Ax1 5/9    Therapy Time   Individual Concurrent Group Co-treatment   Time In 0800     0930   Time Out 0830     1030   Minutes 30     60   Timed Code Treatment Minutes: 80 Minutes       Selene Martínez

## 2022-05-09 NOTE — CARE COORDINATION
MATTHEW spoke with Zita from [facility] via telephone with updates on DCP.  Ju Cast RN

## 2022-05-09 NOTE — PROGRESS NOTES
auscultation, bilaterally without Rales/Wheezes/Rhonchi. Cardiovascular: Regular rate and rhythm with normal S1/S2 without murmurs, rubs or gallops. Abdomen: Soft, non-tender, non-distended with normal bowel sounds. Musculoskeletal: No clubbing, cyanosis or edema bilaterally. Full range of motion without deformity. Skin: Skin color, texture, turgor normal.  No rashes or lesions. Neurologic:  Neurovascularly intact without any focal sensory/motor deficits. Cranial nerves: II-XII intact, grossly non-focal.  Psychiatric: Alert and oriented, thought content appropriate, normal insight  Capillary Refill: Brisk,< 3 seconds   Peripheral Pulses: +2 palpable, equal bilaterally       Labs:   Recent Labs     05/09/22  0708   WBC 6.6   HGB 9.1*   HCT 25.8*        Recent Labs     05/07/22  0715 05/09/22  0708    138   K 4.2 3.8    103   CO2 26 23   BUN 11 10   CREATININE 1.1 1.1   CALCIUM 10.3 10.5     No results for input(s): AST, ALT, BILIDIR, BILITOT, ALKPHOS in the last 72 hours. No results for input(s): INR in the last 72 hours. No results for input(s): Reyne Zambian in the last 72 hours. Urinalysis:      Lab Results   Component Value Date    NITRU Negative 04/27/2022    WBCUA 21-50 04/27/2022    BACTERIA Rare 01/22/2022    RBCUA 3-4 04/27/2022    BLOODU TRACE-INTACT 04/27/2022    SPECGRAV 1.010 04/27/2022    GLUCOSEU Negative 04/27/2022       Consults:    IP CONSULT TO CASE MANAGEMENT  IP CONSULT TO DIETITIAN  IP CONSULT TO SOCIAL WORK  IP CONSULT TO NEPHROLOGY  IP CONSULT TO INFECTIOUS DISEASES  IP CONSULT TO PSYCHIATRY  IP CONSULT TO HOSPITALIST  IP CONSULT TO GI      Assessment/Plan:    Active Hospital Problems    Diagnosis     Moderate malnutrition ([facility] [E44.0]     Critical 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 No overt signs of infection- infection ruled out per ID. CT head non acute.  Held neurontin, zanaflex, desyrel. Psych recs appreciated. Started on seroquel with improvement- continue med. Mentation improved to baseline. Resolved       RODRICK: likely prerenal. Improved with IVF. Cr normalized. Nephro signed off.     Multiple wounds: continue wound care      HTN-  Continue meds with hold parameters.       PAF-now in SR, no AC d/t bleeding risk     MSSA bacteremia with left foot abscess, left hand abscess, osteomyelitis, RUL abscess  - finished course of abx      Anemia:  recent GIB s/p embolization of gastroduodenal artery on 2/25 and ex-lap. Monitor hgb and transfuse if <7     N/V :  Improving. Possible focal ileus on left side of abdomen was noted on KUB. Lipase was normal. CT abd/pelvis  non acute - no ileus or obstruction noted. GI assisting. Possible EGD if not improving. Pt tolerating diet with no significant N/v for past 2 days . Continue supportive care with antiemetics PRN,  PPI      Constipation:  s/p enema and has been having BM's. Seems resolved. Continue bowel regimen.      DMII: BG fairly controlled. Continue insulin        DVT Prophylaxis: SQ Heparin    Recent Labs     05/09/22  0708        Diet: ADULT ORAL NUTRITION SUPPLEMENT; Breakfast, Dinner, Lunch; Diabetic Oral Supplement  ADULT DIET; Regular; 5 carb choices (75 gm/meal)  ADULT ORAL NUTRITION SUPPLEMENT; Lunch, Dinner; Frozen Oral Supplement  Code Status: Full Code      PT/OT Eval Status: in ARU    Dispo - anticipate no medical obstacles to discharge.      Mandi Harris MD

## 2022-05-09 NOTE — PROGRESS NOTES
Alan Kwon  5/9/2022  [id number]    Chief Complaint: Critical illness myopathy    Subjective:   No acute events overnight. Today Hawk is seen in his room with therapies present. He reports that his nausea is improved, but still intermittent. He was able to eat breakfast this morning. We discuss that he has been making limited progress and still requires significant assistance. Patient will likely need discharge to skilled nursing facility for continued rehabilitation. ROS: denies f/c, cp, sob    Objective:  Patient Vitals for the past 24 hrs:   BP Temp Temp src Pulse Resp SpO2   05/09/22 0804 126/80 97.4 °F (36.3 °C) Axillary 81 16 100 %   05/09/22 0701 -- -- -- -- 16 --   05/09/22 0300 -- -- -- -- 16 --   05/08/22 2112 -- -- -- -- 16 --   05/08/22 2037 128/76 98.1 °F (36.7 °C) Oral 72 16 98 %     Gen: Alert, NAD, seated in chair  HEENT: Normocephalic, atraumatic  CV: extremities well perfused  Resp: No respiratory distress. Abd: Soft, nontender, nondistended  Ext: No edema   Neuro: Alert, oriented, speech fluent without aphasia.  Delayed processing   Psych: appropriate mood and affect    Wt Readings from Last 3 Encounters:   05/06/22 230 lb (104.3 kg)   03/10/22 255 lb 1.6 oz (115.7 kg)   10/14/21 282 lb (127.9 kg)       Laboratory data:   Lab Results   Component Value Date    WBC 6.6 05/09/2022    HGB 9.1 (L) 05/09/2022    HCT 25.8 (L) 05/09/2022    MCV 91.0 05/09/2022     05/09/2022       Lab Results   Component Value Date     05/09/2022    K 3.8 05/09/2022     05/09/2022    CO2 23 05/09/2022    BUN 10 05/09/2022    CREATININE 1.1 05/09/2022    GLUCOSE 120 05/09/2022    CALCIUM 10.5 05/09/2022        Therapy progress:  PT  Position Activity Restriction  Other position/activity restrictions: RUE PICC, sacral wound vac  Objective     Sit to Stand: Maximum Assistance,Dependent/Total,2 Person Assistance  Stand to sit: Maximum Assistance,Dependent/Total,2 Person Assistance  Bed to Chair: Dependent/Total     OT  PT Equipment Recommendations  Equipment Needed: No  Mobility Devices: Walker,Hospital Bed,Wheelchair (slideboard, ramp)  Walker: Rolling  Wheelchair: Wheelchair Cushion Pressure Relieving (swing away armrests)  Hospital Bed : Electric - Full (Head of Bed),Bed [facility]  Other: defer to SNF; if d/c to home from this setting, will require hospital bed, slideboard for car transfers, RW for standing to don pants, 22\" width w/c with swing away armrests and anti-tippers, and a ramp     Assessment        SLP  Current Diet : Regular  Current Liquid Diet : Thin  Diet Solids Recommendation: Regular  Liquid Consistency Recommendation: Thin    Body mass index is 30.76 kg/m². Assessment and Plan:  Ryan Guerra is a 42year old male with a past medical history significant for DM2, diabetic foot ulcer with multiple amputations, HFrEF who has had a prolonged hospital course following Covid pneumonia with complications including respiratory failure, GIB, and multiple infections. He was admitted to [facility] on 4/13/22 due to functional deficits below his baseline.      Critical Illness Myopathy  - due to covid pneumonia  - PT, OT, ST     MSSA bactermia  - with left foot abscess, left hand abscess, osteomyelitis, RUL abscess  - transitioned to oral doxycycline, continued through 4/28     Multiple wounds POA  - wound vac to sacral wound and left foot wound  - wound care    Encephalopathy, improved  - etiology unclear. Workup not revealing of infectious, metabolic or intracranial etiology. - wbc, ammonia, electrolytes, lactic acid, and ABG within normal limits  - CT head negative for acute process  - ID following, low suspicion for infection  - possibly polypharmacy? Although patient had been stable on pain medications when hallucinations and encephalopathy developed.  Have wean down and discontinued scheduled dilaudid.  - gabapentin and trazodone discontinue  - tizanidine had been held due to AMS, will try resuming  - seroquel per Psych  - mentation improved    Nausea and vomiting, improved  - chronic problem, but worsening symptoms today and yesterday  - Medicine following, appreciate assistance  - XR abd showing possible ileus. CT AP negative for ileus  - GI consulted, appreciate assistance, started mag ox BID  - continue PRN nausea medications  - reached out to medicine about further management     HFrEF  - EF 35%  - discontinued lasix due to RODRICK, patient remains euvolemic on exam  - daily weights     RODRICK on CKD  - RODRICK due to sepsis, cardiac arrest. Required dialysis during acute stay  - Cr trending down. Previously been in 1-1.2 range  - stopped lasix  - fluids per Nephrology, appreciate assistance    Hyperkalemia, resolved  - suspect due to kidney dysfunction  - s/p lokemia 4/26  - Nephro following    Paroxysmal Atrial Fibrillation  - BB  - AC contraindicated due to bleeding risk    HTN  - lopressor 12.5 mg BID  - discontinued lisinopril     DM2 complicated by neuropathy  - home regimen lantus 75, prandial 15, SSI  - lantus 25 plus SSI  - discontinued 4 units prandial as patient has not been receiving      Diabetic Neuropathy  - gabapentin discontinued while patient was having AMS.  AMS resolved, can consider reintroducing     Acute blood loss anemia  - recent GIB s/p embolization of gastroduodenal artery on 2/25 and ex-lap  - monitor and transfuse for Hb<7     History of GIB  - PPI      Urinary Retention  - flomax  - ISC with high volumes, rea replaced  - will need follow up with Urology     Chronic Pain  - discontinued dilaudid due to AMS  - oxycodone PRN  - restarted tizanidine PRN and monitoring mental status      Bowel: Per protocol  Bladder: Per protocol  Sleep: Trazodone discontinued due to AMS  DVT PPx: heparin    Services/DME: SNF  EDOD: 5/6/22     Follow up appointments: PCP, Cardiology, wound care, Urology    Electronically signed by Deepali Alejo MD on 5/9/2022 at 2:28 PM

## 2022-05-10 NOTE — PROGRESS NOTES
GI messaged via perfect serve. Patient c/o nausea this morning. PRN IV zofran and oral Pheneregan given. No vomiting, patient refusing meals and therapy at this time. Will continue to monitor. GI considering EGD - Attending MD bender messaged to consider as well as Hospitalist MD Valladares. Awaiting responses.

## 2022-05-10 NOTE — PROGRESS NOTES
Comprehensive Nutrition Assessment    Type and Reason for Visit:  Reassess    Nutrition Recommendations/Plan:   1. Recommend General diet order, free of therapeutic restrictions, to promote adequate nutrient intake  2. Encourage PO intake   3. Continue glucerna and magic cups TID  4. RD to order new updated weight   5. Due to malnutrition, if PO intake does not improve, consider alternative methods of nutrition. Consult RD for recommendations. 6. Monitor nutrition adequacy, pertinent labs, bowel habits, wt changes, and clinical progress     Malnutrition Assessment:  Malnutrition Status: Moderate malnutrition (04/14/22 1528)    Context:  Chronic Illness     Findings of the 6 clinical characteristics of malnutrition:  Energy Intake:  7 - 75% or less estimated energy requirements for 1 month or longer  Weight Loss:  7 - 7.5% over 3 months       Nutrition Assessment:    Follow up: Pt continues to be nutritionally compromised AEB refusing meals, nausea and poor PO intake on carb control diet. RD to liberalize diet to regular to increase PO intake. Pt sleepy and did not provide much information during interview today. Intake had approved yesterday, pt ate % of some meals. Pt refused breakfast and lunch today d/t nausea. Diarrhea noted. Intake of glucerna varying, 0-100%. GI following, plan for EGD if symptoms do not improve, will monitor for results. Continue to encourage PO intake and continue current ONS regimen, if intake does not improve, consider alternative methods of nutrition. Will continue to monitor. Nutrition Related Findings:    -211 x 24 hours. Monitoring with insulin. Severe nausea, reglan started. + BM yesterday. Diarrhea noted. Non-pitting LLE edema.  Wound Type: Stage IV,Pressure Injury,Surgical Incision,Burns,Wound Vac       Current Nutrition Intake & Therapies:    Average Meal Intake: 1-25%,0%  Average Supplements Intake: %,1-25%  ADULT ORAL NUTRITION SUPPLEMENT; Breakfast, Dinner, Lunch; Diabetic Oral Supplement  ADULT ORAL NUTRITION SUPPLEMENT; Lunch, Dinner; Frozen Oral Supplement  ADULT DIET; Regular    Anthropometric Measures:  Height: 6' 0.5\" (184.2 cm)  Ideal Body Weight (IBW): 181 lbs (82 kg)       Current Body Weight: 211 lb 11.2 oz (96 kg),   IBW. Weight Source: Bed Scale  Current BMI (kg/m2): 28.3                          BMI Categories: Overweight (BMI 25.0-29. 9)    Estimated Daily Nutrient Needs:  Energy Requirements Based On: Kcal/kg  Weight Used for Energy Requirements: Ideal (80.9 kg)  Energy (kcal/day): 8948-3015  Weight Used for Protein Requirements: Ideal  Protein (g/day): 80-97  Method Used for Fluid Requirements: 1 ml/kcal    Nutrition Diagnosis:   · Moderate malnutrition related to inadequate protein-energy intake,pain as evidenced by Criteria as identified in malnutrition assessment    Nutrition Interventions:   Food and/or Nutrient Delivery: Continue Current Diet,Continue Oral Nutrition Supplement  Nutrition Education/Counseling: Education declined  Coordination of Nutrition Care: Continue to monitor while inpatient,Interdisciplinary Rounds  Plan of Care discussed with: Patient    Goals:  Previous Goal Met: Progress towards Goal(s) Declining  Goals: Meet at least 75% of estimated needs,prior to discharge       Nutrition Monitoring and Evaluation:   Behavioral-Environmental Outcomes: None Identified  Food/Nutrient Intake Outcomes: Food and Nutrient Intake,Supplement Intake  Physical Signs/Symptoms Outcomes: Skin,Weight,GI Status,Nausea or Vomiting,Diarrhea,Biochemical Data    Discharge Planning:    Continue current diet,Continue Oral Nutrition Supplement     Jerlene Rubinstein, [address], [address] [place] [address],   Contact: Office: [phone]; [address]: [phone]

## 2022-05-10 NOTE — PROGRESS NOTES
PROGRESS NOTE  S:46 yrs Patient  admitted on 4/13/2022 with Critical illness myopathy [G72.81] . Tolerating diet, nausea improved. Multiple BMs. Current Hospital Schedued Meds   polyethylene glycol  17 g Oral Daily    NIFEdipine  30 mg Oral BID    metoprolol succinate  50 mg Oral BID    magnesium oxide  400 mg Oral BID    QUEtiapine  100 mg Oral Nightly    QUEtiapine  25 mg Oral BID    mineral oil-hydrophilic petrolatum   Topical Daily    insulin lispro  0-18 Units SubCUTAneous TID WC    insulin lispro  0-9 Units SubCUTAneous Nightly    insulin glargine  25 Units SubCUTAneous Nightly    metoclopramide  10 mg Oral 4x Daily AC & HS    fluticasone  2 spray Each Nostril Nightly    heparin (porcine)  5,000 Units SubCUTAneous 3 times per day    pantoprazole  40 mg Oral BID    PARoxetine  20 mg Oral Daily    multivitamin  1 tablet Oral Daily    tamsulosin  0.4 mg Oral Nightly     Current Hospital IV Meds   dextrose       Current Hospital PRN Meds  tiZANidine, promethazine, senna, haloperidol, QUEtiapine, oxyCODONE, oxyCODONE, prochlorperazine, ondansetron, ondansetron, hydrALAZINE, acetaminophen, diclofenac sodium, glucose, glucagon (rDNA), dextrose, bisacodyl, aluminum & magnesium hydroxide-simethicone, dextrose bolus **OR** dextrose bolus    Exam:   Vitals:    05/09/22 0804   BP: 126/80   Pulse: 81   Resp: 16   Temp: 97.4 °F (36.3 °C)   SpO2: 100%     I/O last 3 completed shifts:   In: 1920 [P.O.:1920]  Out: 2325 [Urine:2325]   General appearance: alert, appears stated age and cooperative  HEENT: PERRLA  Neck: no adenopathy, no carotid bruit, no JVD, supple, symmetrical, trachea midline and thyroid not enlarged, symmetric, no tenderness/mass/nodules  Lungs: clear to auscultation bilaterally  Heart: regular rate and rhythm, S1, S2 normal, no murmur, click, rub or gallop  Abdomen: soft, non-tender; bowel sounds normal; no masses,  no organomegaly  Extremities: extremities normal, atraumatic, no cyanosis or edema     Labs:  CBC:   Recent Labs     05/09/22  0708   WBC 6.6   HGB 9.1*   HCT 25.8*   MCV 91.0        BMP:   Recent Labs     05/07/22  0715 05/09/22  0708    138   K 4.2 3.8    103   CO2 26 23   BUN 11 10   CREATININE 1.1 1.1     LIVER PROFILE: No results for input(s): AST, ALT, LIPASE, PROT, BILIDIR, BILITOT, ALKPHOS in the last 72 hours. Invalid input(s): AMYLASE,  ALB  PT/INR: No results for input(s): INR in the last 72 hours. Invalid input(s): PT    IMAGING:  No results found. Hospital Problems           Last Modified POA    * (Principal) Critical illness myopathy 4/13/2022 Yes    Moderate malnutrition (HCC) (Chronic) 4/14/2022 Yes         Impression:  41 yo male with nausea and constipation    Recommendation:  1. Consider appetite stimulant such as marinol to increase oral intake  2. Decrease magnesium oxide dosing and senokot dosing if significant diarrhea  3. Hospitalist and patient endorse improvement.   Please call if can be of further service      Philippe Elizabeth DO  8:57 PM 5/9/2022            [facility]    [address]      [address] [place] [address]     Phone: [phone]     Fax: [phone]

## 2022-05-10 NOTE — PROGRESS NOTES
OT/PT Attempt Note    OT attempted to see patient with PT for cotx session however per RN, transport was calling to pickup patient for EGD procedure. Therefore HOLD therapy this date. Will continue attempts to follow up with plan of care when schedule and patient allows.     Barbara Gutiérrez, OTR/L  Shannon Pitts, DPT

## 2022-05-10 NOTE — PROGRESS NOTES
Physical Therapy  Facility/Department: [facility]  Rehabilitation Physical Therapy Treatment Note    NAME: Jono Stephens  : 1975 (42 y.o.)  MRN: [id number]  CODE STATUS: Full Code    Date of Service: 5/10/22       Restrictions:  Restrictions/Precautions: General Precautions; Fall Risk  Position Activity Restriction  Other position/activity restrictions: RUE PICC, sacral wound vac     SUBJECTIVE  Subjective  Subjective: Pt supine in bed on approach, requires max encouragement for participation d/t nausea, adamantly declines any OOB mobility  Pain: Pt reports 7/10 pain at sacrum                 OBJECTIVE  Orientation  Overall Orientation Status: Within Functional Limits    Functional Mobility  Roll Left  Assistance Level: Supervision  Skilled Clinical Factors: With BR  Roll Right  Assistance Level: Supervision  Skilled Clinical Factors: With BR  Supine to Sit  Assistance Level: Minimal assistance  Skilled Clinical Factors: Luisa at trunk, HOB elevated, use of BR  Scooting  Assistance Level: Maximum assistance; Requires x 2 assistance  Skilled Clinical Factors: maxA x 2 to scoot to [facility]  Balance  Sitting Balance: Supervision        PT Exercises  PROM Exercises: Seated BLE contract relax hamstring stretch x 5 sec contract +10 second stretch ( x 2 minutes each)  A/AROM Exercises: Seated BLE LAQ, marches, GS x 15      ASSESSMENT/PROGRESS TOWARDS GOALS       Assessment  Assessment: Pt seen in am for PT tx, pt requires maximal encouragement for participation d/t reports of nausea. Pt adamanlty declines OOB mobility but is agreeable with encourgement to sit EOB to complete seated TE. Pt requires S for rolling, Luisa for bed mobility, S for sitting balance. Pt is limited by pain, decreased activity tolerance, nausea and feeling poorly this date. Pt will benefit from continued skilled PT in [facility] to address above deficits, will continue to progress mobility as tolerated.   Activity Tolerance: Patient limited by fatigue;Patient limited by endurance; Patient limited by pain  Discharge Recommendations: Subacute/Skilled Nursing Facility;Home with nursing aide;Home with Home health PT;24 hour supervision or assist (if d/c home will require 24hrA, HHPT and HHA)  PT Equipment Recommendations  Mobility Devices: Larissa Favia; Hospital Bed; Wheelchair (slideboard, ramp)  Walker: Rolling  Wheelchair: Wheelchair Cushion Pressure Relieving (swing away armrests and leg rests)  Other: defer to SNF; if d/c to home from this setting, will require hospital bed, slideboard for car transfers, RW for standing to don pants, 22\" width w/c with swing away armrests and legrests, presesure relieving cushion, anti-tippers, and a ramp    Goals  Short Term Goals  Time Frame for Short term goals: 10 days- 4/22/22  Short term goal 1: Pt will complete bed mobility with min A; goal partially met 4/22 - pt completes supine>sit with min A and up to mod A x2 for sit>supine  Short term goal 2: Pt will complete functional transfers with max A x 1 using LRAD; 5/07: GOAL MET, CGA x 2 to CGA + Luisa sit pivot w/c to/from EOM  Short term goal 3: Pt will propel manual w/c 50ft with min A; goal met 4/21 - pt propels manual w/c 75 ft with min A  Short term goal 4: Pt will tolerate gait assessment and appropriate LTG to be set -- 5/07: GOAL MET 3' in // bars Luisa/modA x 2  Long Term Goals  Time Frame for Long term goals : 3 weeks- 5/4/22 - extended to 5/10 to reflect updated d/c plan  Long term goal 1: Pt will complete bed mobility with supervision; -- GOAL NOT MET 5/09: S for rolling modA supine to sit  Long term goal 2: Pt will complete functional transfers with min A using LRAD -- GOAL MET 5/09: CGA/Luisa sit pivot t/f, modA x 2 STS with RW  Long term goal 3: Pt will propel manual w/c 150ft with mod I -- GOAL NOT MET 5/09: pt requires supervision for w/c propulsion >150 ft  Long term goal 4: Pt will ambulate x 10' in // bars with Luisa x 2 --  GOAL NOT MET 5/09: x 5' in // bars TD    PLAN OF CARE/SAFETY  Plan  Plan:  minutes of therapy at least 5 out of 7 days a week  Plan weeks: 3 weeks  Current Treatment Recommendations: Strengthening;Balance training;Functional mobility training;Transfer training; Endurance training;Gait training;Neuromuscular re-education;Home exercise program;Safety education & training;Patient/Caregiver education & training; Wheelchair mobility training;Equipment evaluation, education, & procurement;Positioning;Manual Therapy - Soft Tissue Mobilization; Therapeutic activities  Safety Devices  Type of Devices: All annemarie prominences offloaded; All fall risk precautions in place;Call light within reach; Patient at risk for falls;Nurse notified;Gait belt; Heels elevated for pressure relief;Bed alarm in place; Left in bed      Therapy Time   Individual Concurrent Group Co-treatment   Time In 0800         Time Out 0830         Minutes 30           Timed Code Treatment Minutes: [address], DPT C/NDT  05/10/22 at 9:06 AM

## 2022-05-10 NOTE — ANESTHESIA PRE PROCEDURE
Department of Anesthesiology  Preprocedure Note       Name:  Maureen Carmichael   Age:  42 y.o.  :  1975                                          MRN:  [id number]         Date:  5/10/2022      Surgeon: Fabian Kinney):  Mari Trivedi DO    Procedure: Procedure(s):  EGD DIAGNOSTIC ONLY    Medications prior to admission:   Prior to Admission medications    Medication Sig Start Date End Date Taking? Authorizing Provider   aluminum & magnesium hydroxide-simethicone (MAALOX) 200-200-20 MG/5ML SUSP suspension Take 30 mLs by mouth every 6 hours as needed for Indigestion 22  Yes Claudette Rich MD   heparin, porcine, 5000 UNIT/ML injection Inject 1 mL into the skin every 8 hours 22  Yes Claudette Rich MD   PARoxetine (PAXIL) 10 MG tablet Take 2 tablets by mouth every morning 22  Yes Claudette Rich MD   insulin glargine (LANTUS SOLOSTAR) 100 UNIT/ML injection pen Inject 25 Units into the skin nightly 22  Yes Claudette Rich MD   ondansetron (ZOFRAN-ODT) 4 MG disintegrating tablet Take 1 tablet by mouth every 8 hours as needed for Nausea or Vomiting 22  Yes Claudette Rich MD   promethazine (PHENERGAN) 12.5 MG tablet Take 1 tablet by mouth every 6 hours as needed for Nausea 22 Yes Claudette Rich MD   prochlorperazine (COMPAZINE) 5 MG tablet Take 1 tablet by mouth every 6 hours as needed for Nausea 22  Yes Claudette Rich MD   QUEtiapine (SEROQUEL) 25 MG tablet Take 25 mg in the morning and afternoon and 100 mg at night until . Then take 12.5 mg in the morning and afternoon and 50 mg at night for 3 days, then stop.  22  Yes Claudette Rich MD   metoprolol succinate (TOPROL XL) 50 MG extended release tablet Take 1 tablet by mouth 2 times daily 22  Yes Claudette Rich MD   NIFEdipine (PROCARDIA XL) 30 MG extended release tablet Take 1 tablet by mouth 2 times daily 22  Yes Claudette Rich MD   fluticasone [facility]) 50 MCG/ACT nasal spray 2 sprays by Each Nostril route at bedtime 5/6/22  Yes Adriana Claros MD   diclofenac sodium (VOLTAREN) 1 % GEL Apply 4 g topically 4 times daily as needed for Pain 5/6/22  Yes Adriana Claros MD   mineral oil-hydrophilic petrolatum (AQUAPHOR) ointment Apply topically as needed. 5/7/22  Yes Adriana Claros MD   bisacodyl (DULCOLAX) 10 MG suppository Place 1 suppository rectally daily as needed for Constipation 5/6/22 6/5/22 Yes Adriana Claros MD   polyethylene glycol [facility]) 17 g packet Take 17 g by mouth daily 5/7/22 6/6/22 Yes Adriana Claros MD   [facility]) 8.6 MG tablet Take 2 tablets by mouth nightly as needed (constipation) 5/6/22 6/5/22 Yes Adriana Claros MD   magnesium oxide (MAG-OX) 400 (240 Mg) MG tablet Take 1 tablet by mouth 2 times daily 5/6/22  Yes Adriana Claros MD   tamsulosin [facility]) 0.4 MG capsule Take 1 capsule by mouth at bedtime 5/6/22  Yes Adriana Claros MD   metoclopramide (REGLAN) 10 MG tablet Take 1 tablet by mouth 4 times daily (before meals and nightly) 5/6/22  Yes Adriana Claros MD   Multiple Vitamins-Minerals (THERAPEUTIC MULTIVITAMIN-MINERALS) tablet Take 1 tablet by mouth daily 5/7/22  Yes Adriana Claros MD   tiZANidine (ZANAFLEX) 4 MG tablet Take 2 tablets by mouth every 6 hours as needed (muscle spasm) 5/6/22  Yes Adriana Claros MD   insulin lispro (HUMALOG) 100 UNIT/ML injection vial Inject 0-18 Units into the skin 4 times daily (before meals and nightly) 3/10/22   Piper Warner MD   pantoprazole (PROTONIX) 40 MG tablet Take 1 tablet by mouth 2 times daily (before meals) 3/10/22   Piper Warner MD   blood glucose test strips (ONETOUCH ULTRA) strip USE TO TEST BLOOD GLUCOSE DAILY. DX:E11.9 3/5/21   Noa Brumfield MD   Blood Glucose Monitoring Suppl (CONTOUR NEXT EZ) w/Device KIT Use to test glucose daily.   DX:E11.9 12/10/20   Noa Brumfield MD   Insulin Pen Needle 32G X 6 MM MISC Use with insulin daily . DX:E11.9 12/4/20   Mabel Felty, MD   blood glucose monitor strips Use to test blood sugar daily.   DX:E11.9 12/4/20   Mabel Felty, MD       Current medications:    Current Facility-Administered Medications   Medication Dose Route Frequency Provider Last Rate Last Admin    tiZANidine (ZANAFLEX) tablet 4 mg  4 mg Oral TID Sivan Little MD        polyethylene glycol [facility]) packet 17 g  17 g Oral Daily Neena Willis MD   17 g at 05/07/22 0851    NIFEdipine (PROCARDIA XL) extended release tablet 30 mg  30 mg Oral BID Neena Willis MD   30 mg at 05/10/22 0749    metoprolol succinate (TOPROL XL) extended release tablet 50 mg  50 mg Oral BID Neena Willis MD   50 mg at 05/10/22 0749    promethazine (PHENERGAN) tablet 12.5 mg  12.5 mg Oral Q6H PRN Sivan Little MD   12.5 mg at 05/10/22 0749    senna (SENOKOT) tablet 17.2 mg  2 tablet Oral Nightly PRN Sivan Little MD        haloperidol (HALDOL) tablet 0.5 mg  0.5 mg Oral Q6H PRN Sivan Little MD        magnesium oxide (MAG-OX) tablet 400 mg  400 mg Oral BID Sivan Little MD   400 mg at 05/10/22 0749    QUEtiapine (SEROQUEL) tablet 100 mg  100 mg Oral Nightly Fernand Moritz, MD   100 mg at 05/09/22 2121    QUEtiapine (SEROQUEL) tablet 25 mg  25 mg Oral BID Fernand Moritz, MD   25 mg at 05/10/22 0749    QUEtiapine (SEROQUEL) tablet 25 mg  25 mg Oral Q4H PRN Fernand Moritz, MD   25 mg at 04/30/22 0258    mineral oil-hydrophilic petrolatum (AQUAPHOR) ointment   Topical Daily Gail Apley, MD   Given at 05/10/22 0750    insulin lispro (HUMALOG) injection vial 0-18 Units  0-18 Units SubCUTAneous TID [facility] Sivan Little MD   6 Units at 05/09/22 1648    insulin lispro (HUMALOG) injection vial 0-9 Units  0-9 Units SubCUTAneous Nightly Sivan Little MD   3 Units at 05/09/22 2124    insulin glargine (LANTUS) injection vial 25 Units  25 Units SubCUTAneous Nightly Elzbieta Rangel MD   25 Units at 05/09/22 2123    oxyCODONE (ROXICODONE) immediate release tablet 5 mg  5 mg Oral Q4H PRN Trina Milner MD   5 mg at 05/09/22 1220    oxyCODONE (ROXICODONE) immediate release tablet 10 mg  10 mg Oral Q4H PRN Trina Milner MD   10 mg at 05/10/22 0755    prochlorperazine (COMPAZINE) tablet 5 mg  5 mg Oral Q6H PRN Trina Milner MD   5 mg at 05/08/22 1369    metoclopramide (REGLAN) tablet 10 mg  10 mg Oral 4x Daily AC & HS Trina Milner MD   10 mg at 05/10/22 1122    ondansetron (ZOFRAN-ODT) disintegrating tablet 4 mg  4 mg Oral Q8H PRN Trina Milner MD   4 mg at 05/05/22 0531    ondansetron (ZOFRAN) injection 4 mg  4 mg IntraVENous Q6H PRN Trina Milner MD   4 mg at 05/10/22 0555    hydrALAZINE (APRESOLINE) tablet 10 mg  10 mg Oral Q2H PRN Trina Milner MD        acetaminophen (TYLENOL) tablet 650 mg  650 mg Oral Q6H PRN Trina Milner MD   650 mg at 05/01/22 0248    diclofenac sodium (VOLTAREN) 1 % gel 4 g  4 g Topical 4x Daily PRN Trina Milner MD   4 g at 05/04/22 0145    fluticasone (FLONASE) 50 MCG/ACT nasal spray 2 spray  2 spray Each Nostril Nightly Trina Milner MD   2 spray at 05/09/22 2123    heparin (porcine) injection 5,000 Units  5,000 Units SubCUTAneous 3 times per day Trina Milner MD   5,000 Units at 05/10/22 0552    glucose (GLUTOSE) 40 % oral gel 15 g  15 g Oral PRN Trina Milner MD        glucagon (rDNA) injection 1 mg  1 mg IntraMUSCular PRN Trina Milner MD        dextrose 5 % solution  100 mL/hr IntraVENous PRN Trina Milner MD        bisacodyl (DULCOLAX) suppository 10 mg  10 mg Rectal Daily PRN Trina Milner MD   10 mg at 04/14/22 1515    aluminum & magnesium hydroxide-simethicone (MAALOX) 200-200-20 MG/5ML suspension 30 mL  30 mL Oral Q6H PRN Trina Milner MD        pantoprazole (PROTONIX) tablet 40 mg  40 mg Oral BID Adriana Claros MD   40 mg at 05/10/22 0749    PARoxetine (PAXIL) tablet 20 mg  20 mg Oral Daily Adriana Claros MD   20 mg at 05/10/22 8839    therapeutic multivitamin-minerals 1 tablet  1 tablet Oral Daily Adriana Claros MD   1 tablet at 05/10/22 1122    tamsulosin (FLOMAX) capsule 0.4 mg  0.4 mg Oral Nightly Adriana Claros MD   0.4 mg at 05/09/22 2120    dextrose bolus (hypoglycemia) 10% 125 mL  125 mL IntraVENous PRN Adriana Claros MD        Or    dextrose bolus (hypoglycemia) 10% 250 mL  250 mL IntraVENous PRN Adriana Claros MD           Allergies: Allergies   Allergen Reactions    Januvia [Sitagliptin] Nausea Only     Has taken metformin without side effects in the past.  Nausea with Janumet in the past.     Metformin And Related      GI Upset    Vancomycin      Pt had red face, swelling itching of eyelids, sore throat after receiving vancmomyin and cefepime. I think this was a histamine releasing reaction from vancomycin most likely.  The cefepime was switched to Zosyn and patient had no reaction to Zosyn    Mustard [employer] Isothiocyanate] Swelling and Rash       Problem List:    Patient Active Problem List   Diagnosis Code    Hypertension I10    Uncontrolled type 2 diabetes mellitus with diabetic polyneuropathy (HCC) E11.42, E11.65    Cardiomyopathy ([address] I42.9    Mixed hyperlipidemia E78.2    Allergic rhinitis J30.9    Osteoarthritis M19.90    Acute osteomyelitis of left foot (HCC) M86.172    Acute renal failure (HCC) N17.9    Staph aureus infection A49.01    Third degree burn of left hand T23.302A    Antibiotic-associated diarrhea K52.1, T36.95XA    Pressure injury of deep tissue of sacral region L89.156    Severe protein-calorie malnutrition (HCC) E43    Paroxysmal atrial fibrillation (HCC) I48.0    Duodenal ulcer K26.9    Diabetic ulcer of left midfoot associated with type 2 diabetes mellitus, with necrosis of bone (HCC) E11.621, Q26.755  Probable abscess of upper lobe of right lung without pneumonia ([address] J85.2    Gastrointestinal hemorrhage K92.2    ABLA (acute blood loss anemia) D62    MSSA bacteremia R78.81, B95.61    Critical illness myopathy G72.81    Moderate malnutrition (HCC) E44.0       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    Chronic osteomyelitis of left foot ([address] 10/27/2020    Closed displaced fracture of distal phalanx of right little finger 4/8/2021    Clostridium difficile infection 11/01/2016    Diabetic ulcer of left forefoot associated with type 2 diabetes mellitus, with necrosis of bone ([address] 8/1/2019    Diabetic ulcer of right great toe associated with type 2 diabetes mellitus, with necrosis of bone ([address] 08/2019    Enterococcal infection 2/3/2022    Failed soft 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 toe amputation site 10/26/2020    Hemodialysis patient [facility])     acute dialysis while in hospital. resolved    History of blood transfusion     History of hyperbaric oxygen therapy 10/28/2020    [facility]- [facility] 3 - compromised flap    Hyperlipidemia     Hypertension     Infective tenosynovitis of extensor tendons of left hand 2/3/2022    Migraine     MSSA bacteremia 1/23/2022    Possible perforated tympanic membrane     as a result of recurrent otitis    Post-op hematoma of left foot 11/12/2020    Recurrent otitis media     Septic shock (HCC)     Staphylococcal arthritis of left foot ([address] 2/1/2022    Syncope     Tear of medial meniscus of left knee     Tobacco use     Toe osteomyelitis, left ([address] 7/24/2020    VAP (ventilator-associated pneumonia) [facility])        Past Surgical History:        Procedure Laterality Date    ARM SURGERY Left 2022    LEFT HAND INCISION AND DRAINAGE performed by Ginger Redmond MD at [address], ESOPHAGUS      bleeding ulcer correction    ENDOSCOPY, COLON, DIAGNOSTIC      FOOT DEBRIDEMENT Left 2022    ULCER DEBRIDEMENT LEFT FOOT performed by Payal Schneider DPM at [address] Left 3/8/2022    ULCER DEBRIDEMENT LEFT FOOT performed by Payal Schneider DPM at [address] [facility]  [address] Right 2022    successful coil embolization of the gastroduodenal artery    IR NONTUNNELED VASCULAR CATHETER  2022    IR NONTUNNELED VASCULAR CATHETER 2022 [facility]    KNEE ARTHROSCOPY Left 08/15/2013    LEFT KNEE ARTHROSCOPY , SYNOVECTOMY       LAPAROTOMY N/A 2022    PYLOROPLASTY, ULCER OVER-SEW, JEJUNOSTOMY TUBE INSERTION performed by Solange Cr MD at [facility] Left 2020    PARTIAL LEFT FOOT AMPUTATION    TOE AMPUTATION Right 2019    PARTIAL RIGHT FOOT AMPUTATION performed by Payal Schneider DPM at [address] [facility] Left 2020    PARTIAL LEFT FOOT AMPUTATION performed by Payal Schneider DPM at [address] [facility] Left 10/22/2020    PARTIAL LEFT FOOT AMPUTATION WITH GRAFT APPLICATION performed by Payal Schneider DPM at [address] N/A 2022    EGD W/ANES.  performed by Asiya Zamorano MD at [address] [facility] N/A 2022    EGD DIAGNOSTIC ONLY performed by Donna Leija MD at [facility] EXTRACTION         Social History:    Social History     Tobacco Use    Smoking status: Former Smoker     Packs/day: 0.50     Years: 2.00     Pack years: 1.00     Quit date: 2005     Years since quittin.7    Smokeless tobacco: Former User     Quit date: 8/13/2005    Tobacco comment: SMOKED OCCASIONALLY UNTIL 8 YEARS AGO   Substance Use Topics    Alcohol use: Not Currently     Comment: RARELY                                Counseling given: Not Answered  Comment: SMOKED OCCASIONALLY UNTIL 8 YEARS AGO      Vital Signs (Current):   Vitals:    05/09/22 2115 05/10/22 0745 05/10/22 0825 05/10/22 1252   BP: (!) 98/56 (!) 148/89  (!) 155/96   Pulse: 78 100  99   Resp: 16 18 16 16   Temp: 97 °F (36.1 °C) 98.1 °F (36.7 °C)  98.4 °F (36.9 °C)   TempSrc: Oral Oral  Oral   SpO2: 97%   100%   Weight:       Height:                                                  BP Readings from Last 3 Encounters:   05/10/22 (!) 155/96   03/10/22 (!) 175/88   03/08/22 (!) 98/56       NPO Status: Time of last liquid consumption: 1100 (ensure)                        Time of last solid consumption: 1700                        Date of last liquid consumption: 05/10/22                        Date of last solid food consumption: 05/09/22    BMI:   Wt Readings from Last 3 Encounters:   05/06/22 230 lb (104.3 kg)   03/10/22 255 lb 1.6 oz (115.7 kg)   10/14/21 282 lb (127.9 kg)     Body mass index is 30.76 kg/m².     CBC:   Lab Results   Component Value Date    WBC 6.6 05/09/2022    RBC 2.83 05/09/2022    HGB 9.1 05/09/2022    HCT 25.8 05/09/2022    MCV 91.0 05/09/2022    RDW 15.4 05/09/2022     05/09/2022       CMP:   Lab Results   Component Value Date     05/09/2022    K 3.8 05/09/2022     05/09/2022    CO2 23 05/09/2022    BUN 10 05/09/2022    CREATININE 1.1 05/09/2022    GFRAA >60 05/09/2022    AGRATIO 0.8 01/22/2022    LABGLOM >60 05/09/2022    LABGLOM 103 09/14/2015    GLUCOSE 120 05/09/2022    PROT 6.8 04/29/2022    CALCIUM 10.5 05/09/2022    BILITOT 0.3 04/29/2022    ALKPHOS 110 04/29/2022    AST 11 04/29/2022    ALT 7 04/29/2022       POC Tests:   Recent Labs     05/10/22  1110   POCGLU 173*       Coags:   Lab Results   Component Value Date PROTIME 13.4 02/26/2022    INR 1.18 02/26/2022    APTT 67.1 01/24/2022       HCG (If Applicable): No results found for: PREGTESTUR, PREGSERUM, HCG, HCGQUANT     ABGs:   Lab Results   Component Value Date    PHART 7.441 04/29/2022    PO2ART 72.1 04/29/2022    ISM3AVH 35.9 04/29/2022    ZIK9YTI 23.9 04/29/2022    BEART 0.0 04/29/2022    A8YPGROG 94.1 04/29/2022        Type & Screen (If Applicable):  No results found for: LABABO, LABRH    Drug/Infectious Status (If Applicable):  No results found for: HIV, HEPCAB    COVID-19 Screening (If Applicable):   Lab Results   Component Value Date    COVID19 Not Detected 03/08/2022           Anesthesia Evaluation  Patient summary reviewed no history of anesthetic complications:   Airway: Mallampati: II  TM distance: >3 FB   Neck ROM: full  Mouth opening: > = 3 FB Dental: normal exam         Pulmonary:normal exam  breath sounds clear to auscultation      (-) COPD, asthma and sleep apnea                           Cardiovascular:  Exercise tolerance: good (>4 METS),   (+) hypertension:, CHF:,     (-) CAD,  angina and  IRVIN      Rhythm: regular  Rate: normal                    Neuro/Psych:   (+) neuromuscular disease:, headaches:,    (-) seizures and TIA           GI/Hepatic/Renal:   (+) PUD,      (-) GERD, liver disease and no renal disease       Endo/Other:    (+) Diabetes, . Abdominal:             Vascular: Other Findings:          Pre-Operative Diagnosis: Critical illness myopathy [G72.81]    42 y.o.   BMI:  Body mass index is 30.76 kg/m².      Vitals:    05/09/22 2115 05/10/22 0745 05/10/22 0825 05/10/22 1252   BP: (!) 98/56 (!) 148/89  (!) 155/96   Pulse: 78 100  99   Resp: 16 18 16 16   Temp: 97 °F (36.1 °C) 98.1 °F (36.7 °C)  98.4 °F (36.9 °C)   TempSrc: Oral Oral  Oral   SpO2: 97%   100%   Weight:       Height:           Allergies   Allergen Reactions    Januvia [Sitagliptin] Nausea Only     Has taken metformin without side effects in the past. Nausea with Janumet in the past.     Metformin And Related      GI Upset    Vancomycin      Pt had red face, swelling itching of eyelids, sore throat after receiving vancmomyin and cefepime. I think this was a histamine releasing reaction from vancomycin most likely. The cefepime was switched to Zosyn and patient had no reaction to Zosyn    Mustard [employer] Isothiocyanate] Swelling and Rash       Social History     Tobacco Use    Smoking status: Former Smoker     Packs/day: 0.50     Years: 2.00     Pack years: 1.00     Quit date: 2005     Years since quittin.7    Smokeless tobacco: Former User     Quit date: 2005    Tobacco comment: SMOKED OCCASIONALLY UNTIL 8 YEARS AGO   Substance Use Topics    Alcohol use: Not Currently     Comment: RARELY       LABS:    CBC  Lab Results   Component Value Date/Time    WBC 6.6 2022 07:08 AM    HGB 9.1 (L) 2022 07:08 AM    HCT 25.8 (L) 2022 07:08 AM     2022 07:08 AM     RENAL  Lab Results   Component Value Date/Time     2022 07:08 AM    K 3.8 2022 07:08 AM     2022 07:08 AM    CO2 23 2022 07:08 AM    BUN 10 2022 07:08 AM    CREATININE 1.1 2022 07:08 AM    GLUCOSE 120 (H) 2022 07:08 AM     COAGS  Lab Results   Component Value Date/Time    PROTIME 13.4 (H) 2022 03:40 PM    INR 1.18 (H) 2022 03:40 PM    APTT 67.1 (H) 2022 07:00 PM          Anesthesia Plan      MAC     ASA 3     (I discussed with the patient the risks and benefits of PIV, anesthesia, IV Narcotics, PACU. All questions were answered the patient agrees with the plan and wishes to proceed)  Induction: intravenous.                           Manolo Nieves MD   5/10/2022

## 2022-05-10 NOTE — PROGRESS NOTES
Physical Therapy  Facility/Department: [facility]  Attempted to see pt for scheduled PT/OT co-tx session [id number]. Pt adamantly declines participation secondary to nausea and not feeling well this am. RN notified reports pt has had anti-nausea medication and to discuss with MD. Will re-attempt pt this afternoon for therapy. Addendum: on second attempt pt going off floor for procedure, MD placing hold order for day. Thanks.     Timed Code Treatment Minutes: 30 Minutes  Variance: 60  Reason: Refusal    Jp Pastures, PT, DPT C/NDT 05/10/22 at 11:43 AM

## 2022-05-10 NOTE — CARE COORDINATION
CM sent referrals to [facility]>denied and [address] CM awaiting return call. Tanvi Vidal RN     1140 CM spoke with admission coordinator and [facility] is out of network with [facility]. Tanvi Vidal RN     8824 CM sent referral to [facility]), pulling for epic to review and will update writer. Tanvi Vidal RN     1094 CM received a telephone call from Anni Luu with [facility]) they have accepted patient. CM will reach out to wife with update. Tanvi Vidal RN     0002 Cm spoke with Blanca Cuellar (Spouse) [phone] via telephone with updated skilled nursing facilities. Cm discussed that Fortunato with [facility]) they have accepted. Jamal Valente (spouse) declined facility. CM also discussed [facility], they have accepted as well, but wife states it is too far and has declined. CM acknowledged.  CM will provide list of pending facilities that are still reviewing and facilities that have declined and reason why. Tanvi Vidal RN

## 2022-05-10 NOTE — PROGRESS NOTES
Occupational Therapy  Facility/Department: [facility] ARU  Rehabilitation Occupational Therapy Daily Treatment Note    Date: 5/10/22  Patient Name: Han Treadwell       Room: 9407/0652-46  MRN: [id number]  Account: [de-identified]   : 1975  (41 y.o.) Gender: male                    Past Medical History:  has a past medical history of Abscess of left foot, Abscess of left hand, Acute encephalopathy, Acute hypoxemic respiratory failure ([address] Acute osteomyelitis of left hand ([address] Acute osteomyelitis of right hallux ([address] Cardiopulmonary arrest ([address] Cellulitis of left foot, CHF (congestive heart failure) ([address] Chronic osteomyelitis of left foot ([address] Closed displaced fracture of distal phalanx of right little finger, Clostridium difficile infection, Diabetic ulcer of left forefoot associated with type 2 diabetes mellitus, with necrosis of bone ([address] Diabetic ulcer of right great toe associated with type 2 diabetes mellitus, with necrosis of bone ([address] Enterococcal infection, 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 Past Surgical History:   has a past surgical history that includes Tonsillectomy; [place] tooth extraction; Knee arthroscopy (Left, 08/15/2013); Toe amputation (Right, 2019); other surgical history (Left, 2020); Toe amputation (Left, 2020); Toe amputation (Left, 10/22/2020); Foot Debridement (Left, 2022); Arm Surgery (Left, 2022);  IR NONTUNNELED VASCULAR CATHETER > 5 YEARS (02/11/2022); Upper gastrointestinal endoscopy (N/A, 02/17/2022); IR EMBOLIZATION HEMORRHAGE (Right, 02/25/2022); Upper gastrointestinal endoscopy (N/A, 2/27/2022); laparotomy (N/A, 2/27/2022); Foot Debridement (Left, 3/8/2022); Cholecystectomy; Dilatation, esophagus; and Endoscopy, colon, diagnostic. Restrictions  Restrictions/Precautions: General Precautions; Fall Risk  Other position/activity restrictions: RUE PICC, sacral wound vac    Subjective  Subjective: Pt in bed, minimally agreeable due to nausea. Only tolerated 30 min bed level ADLs, requesting no cotx PT. Will attempt later this day. 8/10 pain, RN aware  Restrictions/Precautions: General Precautions; Fall Risk             Objective     Cognition  Overall Cognitive Status: Exceptions  Arousal/Alertness: Delayed responses to stimuli  Following Commands: Follows one step commands with repetition; Follows multistep commands with repitition  Attention Span: Attends with cues to redirect  Memory: Decreased recall of precautions;Decreased short term memory  Safety Judgement: Decreased awareness of need for assistance;Decreased awareness of need for safety  Problem Solving: Assistance required to correct errors made;Assistance required to implement solutions;Assistance required to generate solutions;Assistance required to identify errors made  Insights: Decreased awareness of deficits  Initiation: Requires cues for some  Sequencing: Requires cues for some         ADL  Feeding  Assistance Level: Increased time to complete;Set-up; Supervision  Skilled Clinical Factors: sitting upright in bed  Grooming/Oral Hygiene  Assistance Level: Increased time to complete;Supervision;Set-up  Skilled Clinical Factors: sitting upright in bed to wash face/hands, brush hair and teeth with increased time to manage dexterity to open containers.   Upper Extremity Bathing  Assistance Level: Supervision;Set-up  Skilled Clinical Factors: sitting upright in bed use bathing wipes for UB and washcloth for hair with setup and increased time  Lower Extremity Bathing  Assistance Level: Increased time to complete;Minimal assistance  Skilled Clinical Factors: sitting upright in bed, used bathing wipes on front area setup and upper BLE, max assist with bathing buttocks and B lower legs  Tub/Shower Transfers  Skilled Clinical Factors: Pt declined OOB activities and minimal ADLs sitting upright in bed         OT Exercises  A/AROM Exercises: x20 of the following exercises: AROM shoulder flexion, elbow flexion/ext, wrist flexion/ext, finger flexion/ext following along HEP     Assessment  Assessment  Assessment: Pt in bed, reporting nausea needing prologned time to complete simple grooming/bathing tasks sitting upright in bed with setup/SPV. Min-modA for LE bathing tasks. OT educated extensively on impportance of OOB activities and encouraged patient to sit EOB to finish ADL,s pt declined due to pain and nausea. Will attempt later this date for OT/PT cotx. Activity Tolerance: Patient limited by fatigue;Patient limited by endurance; Patient limited by pain  Discharge Recommendations: Patient would benefit from continued therapy after discharge;Subacute/Skilled Nursing Facility  Factors Affecting Discharge: Currently pt is requiring increased assistance for ADLs and transfers. Pt currently has not had family assist with family training. Wife works and is not able to assist during day. Pt would require 24 hour assistance, use of AE, and heavy assistance with ADLs and mobility. Pt would benefit from continued therapy before return home. OT Equipment Recommendations  Mobility Devices: ADL Assistive Devices  Hospital Bed : Electric - Full  (Height of Bed); Electric - Full (Head of Bed); Bed Rails - Full  ADL Assistive Devices: Grab Bars - shower;Transfer Tub Bench;Long-handled Sponge; Toileting - Heavy Duty Commode  Other: bariatric BSC or BSC with padded seat. BSC needs drop arms.   Safety Devices  Type of devices: Call light within reach; Bed alarm in place; Patient at risk for falls; Left in bed; All fall risk precautions in place;Gait belt;Nurse notified  Restraints  Initially in place: No    Patient Education  Education  Education Given To: Patient  Education Provided: ADL Function;Role of Therapy;Plan of Care;Equipment;Home Exercise Program;Precautions; Safety; Energy Conservation; Family Education  Education Provided Comments: improtance of OOB activities,repositioning strategies, skin integrity, Fx transfers with sit pivot,  Education Method: Demonstration;Verbal;Teach Back  Barriers to Learning: None  Education Outcome: Verbalized understanding;Demonstrated understanding;Continued education needed    Plan  Plan  Times per Week: 5-7x per week  Times per Day: Daily  Current Treatment Recommendations: Strengthening;ROM;Balance training;Functional mobility training; Endurance training;Pain management; Safety education & training;Patient/Caregiver education & training;Positioning;Self-Care / ADL;Neuromuscular re-education; Wheelchair mobility training;Equipment evaluation, education, & procurement    Goals  Patient Goals   Patient goals : 1 week (4/21)-EXTEND TO 4/27  Short Term Goals  Time Frame for Short term goals: Pt will complete UB dressing with min A- GOAL MET; Pt SBA for UB dressing at EOB  Short Term Goal 1: Pt will complete LB dressing with mod A-NOT MET, max A x1 5/9  Short Term Goal 2: Pt will complete LB bathing with mod A-GOAL MET 4/26  Short Term Goal 3: Pt will complete UB bathing with SBA-GOAL MET 4/26  Short Term Goal 4: Pt will complete 3 grooming task at w/c level-GOAL MET 4/26  Additional Goals?: No  Long Term Goals  Time Frame for Long term goals : 3 weeks (5/05)  Long Term Goal 1: Pt will complete total body dressing with supv-GOAL PARTIALLY MET 5/5, setup/SPV UE, mod-maxA LE  Long Term Goal 2: Pt will complete total body bathing with supv-GOAL PARTIALLY MET 5/5, setup/SPV UE, mod-maxA LE  Long Term Goal 3: Pt will complete 3 grooming tasks at sink with mod I-GOAL PARTIALLY MET 5/5, setup/SPV sitting upright in bed  Long Term Goal 4: Pt will perform functional transfers with mod I-DOWNGRADE TO MODA X1 due to inconsistent progress.  PARTIALLY MET_ mod-max Ax1 5/9    Therapy Time   Individual Concurrent Group Co-treatment   Time In 1000         Time Out 1030         Minutes 30         Timed Code Treatment Minutes: 2[address] Casie Parker, OTR/L

## 2022-05-10 NOTE — PROGRESS NOTES
PROGRESS NOTE  S:46 yrs Patient  admitted on 4/13/2022 with Critical illness myopathy [G72.81] . reconsulted for nauase and poor appetite. Added for EGD but patient given protein shake    Current Hospital Schedued Meds   tiZANidine  4 mg Oral TID    polyethylene glycol  17 g Oral Daily    NIFEdipine  30 mg Oral BID    metoprolol succinate  50 mg Oral BID    magnesium oxide  400 mg Oral BID    QUEtiapine  100 mg Oral Nightly    QUEtiapine  25 mg Oral BID    mineral oil-hydrophilic petrolatum   Topical Daily    insulin lispro  0-18 Units SubCUTAneous TID WC    insulin lispro  0-9 Units SubCUTAneous Nightly    insulin glargine  25 Units SubCUTAneous Nightly    metoclopramide  10 mg Oral 4x Daily AC & HS    fluticasone  2 spray Each Nostril Nightly    heparin (porcine)  5,000 Units SubCUTAneous 3 times per day    pantoprazole  40 mg Oral BID    PARoxetine  20 mg Oral Daily    multivitamin  1 tablet Oral Daily    tamsulosin  0.4 mg Oral Nightly     Current Hospital IV Meds   dextrose       Current Hospital PRN Meds  promethazine, senna, haloperidol, QUEtiapine, oxyCODONE, oxyCODONE, prochlorperazine, ondansetron, ondansetron, hydrALAZINE, acetaminophen, diclofenac sodium, glucose, glucagon (rDNA), dextrose, bisacodyl, aluminum & magnesium hydroxide-simethicone, dextrose bolus **OR** dextrose bolus    Exam:   Vitals:    05/10/22 1505   BP: (!) 155/93   Pulse: 95   Resp: 16   Temp: 97.7 °F (36.5 °C)   SpO2: 98%     I/O last 3 completed shifts:   In: 1200 [P.O.:1200]  Out: 3525 [Urine:3525]   General appearance: alert, appears stated age and cooperative  HEENT: PERRLA  Neck: no adenopathy, no carotid bruit, no JVD, supple, symmetrical, trachea midline and thyroid not enlarged, symmetric, no tenderness/mass/nodules  Lungs: clear to auscultation bilaterally  Heart: regular rate and rhythm, S1, S2 normal, no murmur, click, rub or gallop  Abdomen: soft, non-tender; bowel sounds normal; no masses,  no organomegaly  Extremities: extremities normal, atraumatic, no cyanosis or edema     Labs:  CBC:   Recent Labs     05/09/22  0708   WBC 6.6   HGB 9.1*   HCT 25.8*   MCV 91.0        BMP:   Recent Labs     05/09/22  0708      K 3.8      CO2 23   BUN 10   CREATININE 1.1     LIVER PROFILE: No results for input(s): AST, ALT, LIPASE, PROT, BILIDIR, BILITOT, ALKPHOS in the last 72 hours. Invalid input(s): AMYLASE,  ALB  PT/INR: No results for input(s): INR in the last 72 hours. Invalid input(s): PT    IMAGING:  No results found. Hospital Problems           Last Modified POA    * (Principal) Critical illness myopathy 4/13/2022 Yes    Moderate malnutrition (HCC) (Chronic) 4/14/2022 Yes         Impression:  43 yo male with nausea and constipation    Recommendation:  1. Reportedly improved but reconsulted due to continued nausea and poor intake. 2. Keep NPO after midnight. Will attempt repeat EGD tomorrow.         Breonna Riggins DO  3:27 PM 5/10/2022            [facility]    [address]      [address] [place] [address]     Phone: [phone]     Fax: [phone]

## 2022-05-10 NOTE — PATIENT CARE CONFERENCE
[facility]  Inpatient Rehabilitation  Weekly Team Conference Note    Date: 2022  Patient Name: Dreama Duverney        MRN: [id number]    : 1975  (42 y.o.)  Gender: male   Referring Practitioner: Lorraine Bradshaw MD  Diagnosis: sepsis      Interventions to be utilized toward barriers to discharge, per discipline:  [address] observed barriers to dc: poor mobility stedy/adrian lift, needs set-up and assist with ADL's, rea catheter, pressure wound with negative pressure therapy. Nursing interventions: assist with ADL's, rea care, maintaining and changing wound dressings. Monitoring for s/s of infection.   Family Education: wife Juan Francisco Quiles involved with care  Fall Risk: yes    Physical therapy observed barriers to dc:    Baseline: independent, works full time, lives with wife              Current level: S rolling, Luisa to modA supine to/from sit, CGA/Luisa sit pivot, modA x 2 STS, 8' gait in // bars Luisa + CGA (TD d/t // bars)              Barriers to DC: weakness, decreased endurance, BP, nausea, pain, no  caregiver assistance available, MARIE with mobility, wounds              Needs in order to achieve dc home/next level of care: Recommend SNF, if d/c home 24hrA with HHA and equipment needs     Physical therapy interventions:   Current Treatment Recommendations: Strengthening,Balance training,Functional mobility training,Transfer training,Endurance training,Gait training,Neuromuscular re-education,Home exercise program,Safety education & training,Patient/Caregiver education & training,Wheelchair mobility training,Equipment evaluation, education, & procurement,Positioning,Manual Therapy - Soft Tissue Mobilization,Therapeutic activities      PHYSICAL THERAPY  PT Equipment Recommendations  Equipment Needed: No  Mobility Devices: Walker,Hospital Bed,Wheelchair (slideboard, ramp)  Walker: Rolling  Wheelchair: Wheelchair Cushion Pressure Relieving (swing away armrests and leg rests)  Hospital Bed : Electric - Full (Head of Bed),Bed [facility]  Other: defer to SNF; if d/c to home from this setting, will require hospital bed, slideboard for car transfers, RW for standing to don pants, 22\" width w/c with swing away armrests and legrests, presesure relieving cushion, anti-tippers, and a ramp    Assessment: Pt seen in am for PT tx, pt requires maximal encouragement for participation d/t reports of nausea. Pt adamanlty declines OOB mobility but is agreeable with encourgement to sit EOB to complete seated TE. Pt requires S for rolling, Luisa for bed mobility, S for sitting balance. Pt is limited by pain, decreased activity tolerance, nausea and feeling poorly this date. Pt will benefit from continued skilled PT in ARU to address above deficits, will continue to progress mobility as tolerated.     Occupational therapy observed barriers to dc:     Baseline: Lives with spouse, Independent with ADLs/IADLs and mobility,  driving              Current level: Dependent for toileting, CGA to setup for UB           ADLs, Luisa-CGA  for bed mobility, max Ax2 for transfers with use of Delanna Zeyad              Barriers to DC: generalized weakness, wound recovery, decreased          activity tolerance              Needs in order to achieve dc home/next level of care: carryover of compensation techniques, min A for functional transfers, CTA for DME needs; pt will need assistance at home-- wife cannot provide will need  SNF if cannot get longer days at ARU    Occupational Therapy interventions:  Current Treatment Recommendations: Strengthening,ROM,Balance training,Functional mobility training,Endurance training,Pain management,Safety education & training,Patient/Caregiver education & training,Positioning,Self-Care / Rahel Dimitry re-education,Wheelchair mobility training,Equipment evaluation, education, & procurement      OCCUPATIONAL THERAPY   Assessment: Pt in bed, reporting nausea needing prologned time to complete simple grooming/bathing tasks sitting upright in bed with setup/SPV. Min-modA for LE bathing tasks. OT educated extensively on impportance of OOB activities and encouraged patient to sit EOB to finish ADL,s pt declined due to pain and nausea. Will attempt later this date for OT/PT cotx. SPEECH THERAPY  No speech therapy for this patient. NUTRITION  Weight: 230 lb (104.3 kg) / Body mass index is 30.76 kg/m². Diet Order: ADULT DIET; Regular  PO Meals Eaten (%): 1 - 25%  Education: Education declined    CASE MANAGEMENT  Assessment: 42 yr old male. DX:Critical illness myopathy and   Moderate malnutrition (HCC) (Chronic). GI, IM, wound care and Therapy following. Patient to have an EGD per GI. Therapy recommendations are skilled nursing facility. CM has referrals out to [address], [facility]>accepted but wife refused and [facility]. [facility] has accept patient but it is too far, per wife. CM will discuss this option once facilities are all exhausted. CM will continue to support for discharge needs.        Interdisciplinary Goals:   1.) Pt will complete bed <> chair t/f with LRAD with SBA  2.) Pt will complete 3 grooming tasks at sink with mod I      Discharge Plan   Estimated discharge date: 5/16/22 after tx  Destination: Home with home health  Pass:No  Services at [address] [facility] PT/OT/ST, nursing, aid, wound care  Equipment at [facility] 79 bed, TTB, bariatric commde with drop arm, slide board, RW, wheelchair with swing away leg rests and anti tippers.      Team Members Present at Conference:  : Dallas Mcburney  Occupational Therapist: Kesha Hodges OT  Physical Therapist: John Barnes, PT, DPT  Speech Therapist: N/A  Nurse: Ana Paula Andrade, SURESH  Dietician: Hazel Murphy RD, LD   Supervisor: Nino Stearns  Psychiatry: N/A    Family members present at conference: No    I led this team conference and I approve the established interdisciplinary plan of care as documented within the medical record of Maureen Carmichael. MD: Brittani Ayala.  Clara Abdi MD  5/11/22  11:33 AM

## 2022-05-10 NOTE — PLAN OF CARE
Problem: Falls - Risk of:  Goal: Will remain free from falls  Description: Will remain free from falls  5/10/2022 0851 by Jerry Mensah RN  Outcome: Progressing     Problem: Falls - Risk of:  Goal: Absence of physical injury  Description: Absence of physical injury  Outcome: Progressing

## 2022-05-10 NOTE — PROGRESS NOTES
Leonidas Din  5/10/2022  [id number]    Chief Complaint: Critical illness myopathy    Subjective:   No acute events overnight. Patient refusing therapies this morning due to nausea and vomiting. He reports being able to eat most of his dinner last night, but was unable to eat breakfast this morning. He continues to have intermittent severe nausea that interferes with his ability to tolerate therapies. Nursing reaching out to GI and medicine to discuss possible further workup. ROS: denies f/c, cp, sob    Objective:  Patient Vitals for the past 24 hrs:   BP Temp Temp src Pulse Resp SpO2   05/10/22 0825 -- -- -- -- 16 --   05/10/22 0745 (!) 148/89 98.1 °F (36.7 °C) Oral 100 18 --   05/09/22 2115 (!) 98/56 97 °F (36.1 °C) Oral 78 16 97 %     Gen: appears ill, supine in bed  HEENT: Normocephalic, atraumatic  CV: extremities well perfused  Resp: No respiratory distress. Abd: Soft, nontender, nondistended  Ext: No edema   Neuro: Alert, oriented, speech fluent without aphasia.  Delayed processing   Psych: appropriate mood and affect    Wt Readings from Last 3 Encounters:   05/06/22 230 lb (104.3 kg)   03/10/22 255 lb 1.6 oz (115.7 kg)   10/14/21 282 lb (127.9 kg)       Laboratory data:   Lab Results   Component Value Date    WBC 6.6 05/09/2022    HGB 9.1 (L) 05/09/2022    HCT 25.8 (L) 05/09/2022    MCV 91.0 05/09/2022     05/09/2022       Lab Results   Component Value Date     05/09/2022    K 3.8 05/09/2022     05/09/2022    CO2 23 05/09/2022    BUN 10 05/09/2022    CREATININE 1.1 05/09/2022    GLUCOSE 120 05/09/2022    CALCIUM 10.5 05/09/2022        Therapy progress:  PT  Position Activity Restriction  Other position/activity restrictions: RUE PICC, sacral wound vac  Objective     Sit to Stand: Maximum Assistance,Dependent/Total,2 Person Assistance  Stand to sit: Maximum Assistance,Dependent/Total,2 Person Assistance  Bed to Chair: Dependent/Total     OT  PT Equipment Recommendations  Equipment Needed: No  Mobility Devices: Walker,Hospital Bed,Wheelchair (slideboard, ramp)  Walker: Rolling  Wheelchair: Wheelchair Cushion Pressure Relieving (swing away armrests and leg rests)  Hospital Bed : Electric - Full (Head of Bed),Bed Rails - Quadrant  Other: defer to SNF; if d/c to home from this setting, will require hospital bed, slideboard for car transfers, RW for standing to don pants, 22\" width w/c with swing away armrests and legrests, presesure relieving cushion, anti-tippers, and a ramp     Assessment        SLP  Current Diet : Regular  Current Liquid Diet : Thin  Diet Solids Recommendation: Regular  Liquid Consistency Recommendation: Thin    Body mass index is 30.76 kg/m². Assessment and Plan:  Nain Gaitan is a 42year old male with a past medical history significant for DM2, diabetic foot ulcer with multiple amputations, HFrEF who has had a prolonged hospital course following Covid pneumonia with complications including respiratory failure, GIB, and multiple infections. He was admitted to [facility] on 4/13/22 due to functional deficits below his baseline.      Critical Illness Myopathy  - due to covid pneumonia  - PT, OT, ST     MSSA bactermia  - with left foot abscess, left hand abscess, osteomyelitis, RUL abscess  - transitioned to oral doxycycline, continued through 4/28     Multiple wounds POA  - wound vac to sacral wound and left foot wound  - wound care    Encephalopathy, improved  - etiology unclear. Workup not revealing of infectious, metabolic or intracranial etiology. - wbc, ammonia, electrolytes, lactic acid, and ABG within normal limits  - CT head negative for acute process  - ID following, low suspicion for infection  - possibly polypharmacy? Although patient had been stable on pain medications when hallucinations and encephalopathy developed.  Have wean down and discontinued scheduled dilaudid.  - gabapentin and trazodone discontinue  - tizanidine had been held due to AMS, will try resuming  - seroquel per Psych  - mentation improved    Nausea and vomiting  - chronic problem, but has been intermittently interfering with therapies  - XR abd showing possible ileus. CT AP negative for ileus  - Medicine and GI following, appreciate assistance. May need addition workup, pending recs  - continue PRN nausea medications     HFrEF  - EF 35%  - discontinued lasix due to RODRICK, patient remains euvolemic on exam  - daily weights     CKD  - RODRICK during acute stay due to sepsis, cardiac arrest, requiring dialysis  - recent RODRICK resolved with discontinuing lasix and giving fluids  - Nephrology following, appreciate assistance    Hyperkalemia, resolved  - suspect due to kidney dysfunction  - s/p lokemia 4/26  - Nephro following    Paroxysmal Atrial Fibrillation  - BB  - AC contraindicated due to bleeding risk    HTN  - lopressor 12.5 mg BID  - discontinued lisinopril     DM2 complicated by neuropathy  - home regimen lantus 75, prandial 15, SSI  - lantus 25 plus SSI     Diabetic Neuropathy  - gabapentin discontinued while patient was having AMS.  AMS resolved, can consider reintroducing     Acute blood loss anemia  - recent GIB s/p embolization of gastroduodenal artery on 2/25 and ex-lap  - monitor and transfuse for Hb<7     History of GIB  - PPI      Urinary Retention  - flomax  - ISC with high volumes, rea replaced  - will need follow up with Urology     Chronic Pain  - discontinued dilaudid due to AMS  - oxycodone PRN  - schedule muscle relaxer and monitoring mental status      Bowel: Per protocol  Bladder: Per protocol  Sleep: Trazodone discontinued due to AMS  DVT PPx: heparin    Services/DME: TBD  EDOD: TBD     Follow up appointments: PCP, Cardiology, wound care, Urology    Electronically signed by Deepali Alejo MD on 5/10/2022 at 11:48 AM

## 2022-05-10 NOTE — PROGRESS NOTES
Hospitalist Progress Note      PCP: Yosef Quiles MD    Date of Admission: 4/13/2022    Chief Complaint: Encephalopathy    Subjective: no new c/o. Medications:  Reviewed    Infusion Medications    dextrose       Scheduled Medications    tiZANidine  4 mg Oral TID    polyethylene glycol  17 g Oral Daily    NIFEdipine  30 mg Oral BID    metoprolol succinate  50 mg Oral BID    magnesium oxide  400 mg Oral BID    QUEtiapine  100 mg Oral Nightly    QUEtiapine  25 mg Oral BID    mineral oil-hydrophilic petrolatum   Topical Daily    insulin lispro  0-18 Units SubCUTAneous TID WC    insulin lispro  0-9 Units SubCUTAneous Nightly    insulin glargine  25 Units SubCUTAneous Nightly    metoclopramide  10 mg Oral 4x Daily AC & HS    fluticasone  2 spray Each Nostril Nightly    heparin (porcine)  5,000 Units SubCUTAneous 3 times per day    pantoprazole  40 mg Oral BID    PARoxetine  20 mg Oral Daily    multivitamin  1 tablet Oral Daily    tamsulosin  0.4 mg Oral Nightly     PRN Meds: promethazine, senna, haloperidol, QUEtiapine, oxyCODONE, oxyCODONE, prochlorperazine, ondansetron, ondansetron, hydrALAZINE, acetaminophen, diclofenac sodium, glucose, glucagon (rDNA), dextrose, bisacodyl, aluminum & magnesium hydroxide-simethicone, dextrose bolus **OR** dextrose bolus      Intake/Output Summary (Last 24 hours) at 5/10/2022 1010  Last data filed at 5/10/2022 0825  Gross per 24 hour   Intake 600 ml   Output 2400 ml   Net -1800 ml       Physical Exam Performed:    BP (!) 148/89   Pulse 100   Temp 98.1 °F (36.7 °C) (Oral)   Resp 16   Ht 6' 0.5\" (1.842 m)   Wt 230 lb (104.3 kg)   SpO2 97%   BMI 30.76 kg/m²     General appearance: No apparent distress, appears stated age and cooperative. HEENT: Pupils equal, round, and reactive to light. Conjunctivae/corneas clear. Neck: Supple, with full range of motion. No jugular venous distention. Trachea midline. Respiratory:  Normal respiratory effort. Clear to auscultation, bilaterally without Rales/Wheezes/Rhonchi. Cardiovascular: Regular rate and rhythm with normal S1/S2 without murmurs, rubs or gallops. Abdomen: Soft, non-tender, non-distended with normal bowel sounds. Musculoskeletal: No clubbing, cyanosis or edema bilaterally. Full range of motion without deformity other than bilateral toe amputations. Skin: Skin color, texture, turgor normal.  No rashes or lesions other than to L hand. Neurologic:  Neurovascularly intact without any focal sensory/motor deficits. Cranial nerves: II-XII intact, grossly non-focal.  Psychiatric: Alert and oriented, thought content appropriate, normal insight  Capillary Refill: Brisk,< 3 seconds   Peripheral Pulses: +2 palpable, equal bilaterally       Labs:   Recent Labs     05/09/22  0708   WBC 6.6   HGB 9.1*   HCT 25.8*        Recent Labs     05/09/22  0708      K 3.8      CO2 23   BUN 10   CREATININE 1.1   CALCIUM 10.5     No results for input(s): AST, ALT, BILIDIR, BILITOT, ALKPHOS in the last 72 hours. No results for input(s): INR in the last 72 hours. No results for input(s): Zettie Binet in the last 72 hours. Urinalysis:      Lab Results   Component Value Date    NITRU Negative 04/27/2022    WBCUA 21-50 04/27/2022    BACTERIA Rare 01/22/2022    RBCUA 3-4 04/27/2022    BLOODU TRACE-INTACT 04/27/2022    SPECGRAV 1.010 04/27/2022    GLUCOSEU Negative 04/27/2022       Consults:    IP CONSULT TO CASE MANAGEMENT  IP CONSULT TO DIETITIAN  IP CONSULT TO SOCIAL WORK  IP CONSULT TO NEPHROLOGY  IP CONSULT TO INFECTIOUS DISEASES  IP CONSULT TO PSYCHIATRY  IP CONSULT TO HOSPITALIST  IP CONSULT TO GI      Assessment/Plan:    Active Hospital Problems    Diagnosis     Moderate malnutrition ([facility] [address] [E44.0]     Critical 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 No overt signs of infection- infection ruled out per ID.  CT head non acute. Held neurontin, zanaflex, desyrel. Psych recs appreciated. Started on seroquel with improvement- continue med. Mentation improved to baseline. Resolved       RODRICK: likely prerenal. Improved with IVF. Cr normalized. Nephro signed off.     Multiple wounds: continue wound care      HTN - w/out known CAD and no evidence of active signs/sxs of ischemia/failure. Currently controlled on home meds w/ vitals reviewed and documented as above.      PAF - now in SR, no AC d/t bleeding risk     MSSA bacteremia -  with left foot abscess, left hand abscess, osteomyelitis, RUL abscess. Completed ABX      Anemia:  recent GIB s/p embolization of gastroduodenal artery on 2/25 and ex-lap. Monitor hgb and transfuse if <7     N/V :  Improving. Possible focal ileus on left side of abdomen was noted on KUB. Lipase was normal. CT abd/pelvis non acute - no ileus or obstruction noted. GI consulted and appreciated w/ plan for EGD Wed 11 May. .  Continue supportive care with antiemetics PRN,  PPI      Constipation:  s/p enema and has been having BM's. Seems resolved. Continue bowel regimen.      DMII: BG fairly controlled. Continue insulin        DVT Prophylaxis: SQ Heparin    Recent Labs     05/09/22  0708        Diet: ADULT ORAL NUTRITION SUPPLEMENT; Breakfast, Dinner, Lunch; Diabetic Oral Supplement  ADULT DIET; Regular; 5 carb choices (75 gm/meal)  ADULT ORAL NUTRITION SUPPLEMENT; Lunch, Dinner; Frozen Oral Supplement  Code Status: Full Code      PT/OT Eval Status: in ARU    Dispo - anticipate no medical obstacles to discharge. Wife Kassie Arora of 13 years NOT present at bedside when seen.        Melita Martínez MD

## 2022-05-11 NOTE — PROGRESS NOTES
Occupational Therapy  Facility/Department: [facility]  Rehabilitation Occupational Therapy Daily Treatment Note    Date: 22  Patient Name: Maureen Carmichael       Room: 7496/0196-00  MRN: [id number]  Account: [de-identified]   : 1975  (41 y.o.) Gender: male                    Past Medical History:  has a past medical history of Abscess of left foot, Abscess of left hand, Acute encephalopathy, Acute hypoxemic respiratory failure ([address] Acute osteomyelitis of left hand ([address] Acute osteomyelitis of right hallux ([address] Cardiopulmonary arrest ([address] Cellulitis of left foot, CHF (congestive heart failure) ([address] Chronic osteomyelitis of left foot ([address] Closed displaced fracture of distal phalanx of right little finger, Clostridium difficile infection, Diabetic ulcer of left forefoot associated with type 2 diabetes mellitus, with necrosis of bone ([address] Diabetic ulcer of right great toe associated with type 2 diabetes mellitus, with necrosis of bone ([address] Enterococcal infection, 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 Past Surgical History:   has a past surgical history that includes Tonsillectomy; [place] tooth extraction; Knee arthroscopy (Left, 08/15/2013); Toe amputation (Right, 2019); other surgical history (Left, 2020); Toe amputation (Left, 2020); Toe amputation (Left, 10/22/2020); Foot Debridement (Left, 2022); Arm Surgery (Left, 2022);  IR NONTUNNELED VASCULAR CATHETER > 5 YEARS (02/11/2022); Upper gastrointestinal endoscopy (N/A, 02/17/2022); IR EMBOLIZATION HEMORRHAGE (Right, 02/25/2022); Upper gastrointestinal endoscopy (N/A, 2/27/2022); laparotomy (N/A, 2/27/2022); Foot Debridement (Left, 3/8/2022); Cholecystectomy; Dilatation, esophagus; and Endoscopy, colon, diagnostic. Restrictions  Restrictions/Precautions: General Precautions; Fall Risk  Other position/activity restrictions: RUE PICC, sacral wound vac, rea    Subjective  Subjective: Pt in bed, feeling slightly nauseous but better since procedure, agreeable to OT session and getting dressed, pain 4/10 in buttocks, /84, HR 91, O2 sats 100%. Restrictions/Precautions: General Precautions; Fall Risk             Objective     Cognition  Overall Cognitive Status: Exceptions  Arousal/Alertness: Delayed responses to stimuli  Following Commands: Follows one step commands with repetition; Follows multistep commands with repitition  Attention Span: Attends with cues to redirect  Memory: Decreased recall of precautions;Decreased short term memory  Safety Judgement: Decreased awareness of need for assistance;Decreased awareness of need for safety  Problem Solving: Assistance required to correct errors made;Assistance required to implement solutions;Assistance required to generate solutions;Assistance required to identify errors made  Insights: Decreased awareness of deficits  Initiation: Requires cues for some  Sequencing: Requires cues for some  Orientation  Overall Orientation Status: Within Functional Limits         ADL  Grooming/Oral Hygiene  Assistance Level: Dependent  Skilled Clinical Factors: sitting on paddles in [facility], CGA to reach forward to brush teeth at sink  Upper Extremity Dressing  Assistance Level: Set-up  Skilled Clinical Factors: sitting EOB  Lower Extremity Dressing  Assistance Level: Dependent  Skilled Clinical Factors: assist to thread BLEs into shorts and pull over hips in stance at EOB in [place]  Putting On/Taking Off Footwear  Assistance Level: Dependent  Toileting  Assistance Level: Dependent  Skilled Clinical Factors: rea catheter     Functional Mobility  Activity: To/From bathroom  Assistance Level: Dependent  Skilled Clinical Factors: use of [place]  Bed Mobility  Overall Assistance Level: Stand By Assist  Additional Factors: With handrails; Head of bed raised; Increased time to complete;Verbal cues  Supine to Sit  Assistance Level: Stand by assist  Transfers  Surface: From bed  Additional Factors: Increased time to complete; Mat raised; With handrails;Verbal cues; Hand placement cues  Sit to Stand  Assistance Level: Moderate assistance  Skilled Clinical Factors: to  [facility] to Sit  Assistance Level: Moderate assistance  Bed To/From Chair  Technique:  [place])  Assistance Level: Dependent  Skilled Clinical Factors: mod A with use of Erinn Lara   OT Exercises  A/AROM Exercises: x20 elbow flexion, wrist extension, shoulder abduction, and supination/pronation with 2# weight, AROM x5 shoulder flexion     Assessment  Assessment  Assessment: Pt agreeable to OT session and ADL. Pt completed supine>sit with SBA and sitting on EOB with SPV. Pt performed UB dressing with setup but required total assist for LB dressing to don socks and shorts with mod A to  [place]. [facility] to complete oral hygiene at sink with CGA for leaning forward to spit in sink. Pt demonstrated fair tolerance to  [facility] for 35 seconds prior to sitting in recliner. Pt completed BUE ther ex in recliner with fair to good strength. Continue POC. Activity Tolerance: Patient limited by fatigue;Patient limited by endurance; Patient limited by pain  Discharge Recommendations: Patient would benefit from continued therapy after discharge;Subacute/Skilled Nursing Facility  Factors Affecting Discharge: Currently pt is requiring increased assistance for ADLs and transfers.  Pt currently has not had family assist with family training. Wife works and is not able to assist during day. Pt would require 24 hour assistance, use of AE, and heavy assistance with ADLs and mobility. Pt would benefit from continued therapy before return home. Second session: OT/PT cotx indicated to address functional transfers, balance and tolerance. Pt agreeable to get to [facility] and complete functional mobility trials out of his room this date in the gym. Supine to sit SBA, scooted to EOB CGA. Pt needing assistance for donning shoes and RLE knee brace. Sit <> stand from EOB Luisa x2 with herb steady to WC. Once in gym, pt was able to complete 2 sit <> Stands from [facility] to RW modA X2, statically standing 2x1 minute with 2-3 minute rest break between. Pt was able to complete 2 bouts of functional mobility with RW this date, grossly mod-maxA x2 with close WC follow. Use of balance beam on floor to promote wider CRYSTAL and given target for mobility with increased success. Pt continues to need physical assistance for knee stabilization and weight shifting from PT while OT providing cues for safety, upright posture, and energy conservation. Pt requesting to sit outside, able to complete WC propulsion 3x65 ft with 2 sitting rest breaks ~4 minutes each. Pt needing PRN assistance to avoid obstacles and problem solving over uneven surfaces. Pt left in bed with all needs. Wife was present and engaged with therapy during session however having no questions pertaining to DC plan, patients goals vs deficits, needs. Will continue to follow up and educate. Cont OT POC. OT Equipment Recommendations  Equipment Needed: Yes  Mobility Devices: ADL Assistive Devices  ADL Assistive Devices: Grab Bars - shower;Transfer Tub Bench;Long-handled Sponge; Toileting - Heavy Duty Commode  Other: bariatric BSC or BSC with padded seat. BSC needs drop arms.   Safety Devices  Safety Devices in place: Yes  Type of devices: Call light within reach; Patient at risk for falls; All fall risk precautions in place;Gait belt;Nurse notified; Left in chair;Chair alarm in place    Patient Education  Education  Education Given To: Patient  Education Provided: ADL Function;Role of Therapy;Plan of Care;Equipment;Home Exercise Program;Precautions; Safety; Energy Conservation; Family Education  Education Provided Comments: improtance of OOB activities,repositioning strategies, skin integrity, Fx transfers with sit pivot,  Education Method: Demonstration;Verbal;Teach Back  Barriers to Learning: None  Education Outcome: Verbalized understanding;Demonstrated understanding;Continued education needed    Plan  Plan  Times per Week: 5-7x per week  Current Treatment Recommendations: Strengthening;ROM;Balance training;Functional mobility training; Endurance training;Pain management; Safety education & training;Patient/Caregiver education & training;Positioning;Self-Care / ADL;Neuromuscular re-education; Wheelchair mobility training;Equipment evaluation, education, & procurement    Goals  Patient Goals   Patient goals : 1 week (4/21)-EXTEND TO 4/27  Short Term Goals  Time Frame for Short term goals: Pt will complete UB dressing with min A- GOAL MET; Pt SBA for UB dressing at EOB  Short Term Goal 1: Pt will complete LB dressing with mod A-NOT MET, max A x1 5/9  Short Term Goal 2: Pt will complete LB bathing with mod A-GOAL MET 4/26  Short Term Goal 3: Pt will complete UB bathing with SBA-GOAL MET 4/26  Short Term Goal 4: Pt will complete 3 grooming task at w/c level-GOAL MET 4/26  Short Term Goal 5: Pt will complete x20 reps of BUE HEP to improve UB strength/endurance for repositioning and UB ADLs- GOAL MET 4/20;  Pt completing x20 BUE AROM  Long Term Goals  Time Frame for Long term goals : 3 weeks (5/05)  Long Term Goal 1: Pt will complete total body dressing with supv-GOAL PARTIALLY MET 5/5, setup/SPV UE, mod-maxA LE  Long Term Goal 2: Pt will complete total body bathing with supv-GOAL

## 2022-05-11 NOTE — PROGRESS NOTES
Physical Therapy  Facility/Department: [facility]  Rehabilitation Physical Therapy Treatment Note    NAME: Ashlee Eason  : 1975 (42 y.o.)  MRN: [id number]  CODE STATUS: Full Code    Date of Service: 22       Restrictions:  Restrictions/Precautions: General Precautions; Fall Risk  Position Activity Restriction  Other position/activity restrictions: RUE PICC, sacral wound vac     SUBJECTIVE  Subjective  Subjective: Pt supine in bed on approach, requires max encouragement for participation d/t nausea and having just returned back to room from EGD. Pt only agreeable to LE supine exercises  Pain: Pt reports 7/10 pain at sacrum, low back, and hips. RN aware and administers pain medication during PT session          OBJECTIVE  Orientation  Overall Orientation Status: Within Functional Limits    Functional Mobility  Scooting  Assistance Level: Maximum assistance; Requires x 2 assistance  Skilled Clinical Factors: maxA x 2 to scoot to [facility]  Further bed mobility or functional transfers not assessed this date as pt adamantly declining EOB or OOB activity d/t pain and nausea      PT Exercises  A/AROM Exercises: Supine BLE exercises: heelslides x 10, glute sets x 10, hip abd/add x 10, SAQ x 10 (pt requires increased time and multiple therapeutic rest breaks throughout d/t pain, nausea, and fatigue)      ASSESSMENT/PROGRESS TOWARDS GOALS  Vital Signs  Pulse: 87  Heart Rate Source: Monitor  BP: 137/87  BP Location: Left upper arm  MAP (Calculated): 103.67  SpO2: 99 %  O2 Device: None (Room air)    Assessment  Assessment: Pt seen for PT session this morning with focus on LE exercises. Pt requires max encouragement for participation in minimal LE exercises d/t nausea and pain. Pt adamantly declines OOB or EOB activity this session. He continues to require max A x 2 for scooting in bed and demo's limited ROM with exercises d/t weakness and pain.  Pt remains limited by pain, nausea, decreased activity tolerance, strength deficits, and feeling poorly. Will continue to progress functional mobility as appropriate and as pt tolerates. Activity Tolerance: Patient limited by fatigue;Patient limited by endurance; Patient limited by pain  Discharge Recommendations: Continue to assess pending progress;Subacute/Skilled Nursing Facility  PT Equipment Recommendations  Other: defer to SNF; if d/c to home from this setting, will require hospital bed, slideboard for car transfers, RW for standing to don pants, 22\" width w/c with swing away armrests and legrests, presesure relieving cushion, anti-tippers, and a ramp       Addendum: 2nd Session  Pt supine in bed upon arrival and not resistant to PT/OT cotx session. Reports pain in sacrum, back, and hips, rated 7/10. Pt's spouse, Barber Robins, present for session. Pt seen for PT/OT cotx session this afternoon d/t medical complexity and need for skilled 2 therapist assistance, in order to optimize progression of functional mobility due to given pt current weakness and MARIE for STS transfers and standing balance. Bed mobility:   Supine to sit with SBA   Sit to supine with mod A (assist for BLE)  Transfers:   Sit to/from stand with min A x 2 using Stedy (pt completes x 2 reps), max A x 2 up to RW (pt completes x 3 reps) with cues for hand placement/sequencing   Bed<>w/c dependently using Stedy  Gait:   Pt ambulates 5ft + 5ft with mod-max A x 2 using RW, with balance beam placed between feet to facilitate widened CRYSTAL and prevent scissoring and narrow CRYSTAL. Pt demo's decreased emili and B step length, decreased B foot clearance, min B hip/knee flexion in stance (cues for quad control and tightening glutes), assist to maneuver and stabilize RW. Close w/c follow throughout for safety. Distance limited by fatigue, weakness, and pain. Pt requires ~3-4 min seated rest break after each bout to recover.  PT assists with R/L weight shifting, advancing RW, cueing pt for increasing step length and foot clearance, and cueing pt for quad control/knee extension. OT assists with weight shifting, trunk/posture, and cueing pt for knee extension. Wheelchair mobility: pt propels manual w/c 75ft + 100ft (over both level tile and outdoors) with SBA using BUE. Pt demo's decreased velocity and decreased BUE stroke length. Intermittent seated rest breaks d/t BUE fatigue and weakness. Pt supine in bed at end of session with needs in reach and bed alarm on. Goals  Patient Goals   Patient goals : \"to walk again\"  Short Term Goals  Time Frame for Short term goals: 10 days- 4/22/22  Short term goal 1: Pt will complete bed mobility with min A; goal partially met 4/22 - pt completes supine>sit with min A and up to mod A x2 for sit>supine  Short term goal 2: Pt will complete functional transfers with max A x 1 using LRAD; 5/07: GOAL MET, CGA x 2 to CGA + Luisa sit pivot w/c to/from EOM  Short term goal 3: Pt will propel manual w/c 50ft with min A; goal met 4/21 - pt propels manual w/c 75 ft with min A  Short term goal 4: Pt will tolerate gait assessment and appropriate LTG to be set -- 5/07: GOAL MET 3' in // bars Luisa/modA x 2  Long Term Goals  Time Frame for Long term goals : 3 weeks- 5/4/22 - goals extended to 5/16 to reflect updated d/c plan  Long term goal 1: Pt will complete bed mobility with supervision; -- GOAL NOT MET 5/09: S for rolling modA supine to sit  Long term goal 2: Pt will complete functional transfers with min A using LRAD -- GOAL MET 5/09: CGA/Luisa sit pivot t/f, modA x 2 STS with RW  Long term goal 3: Pt will propel manual w/c 150ft with mod I -- GOAL NOT MET 5/09: pt requires supervision for w/c propulsion >150 ft  Long term goal 4: Pt will ambulate x 10' in // bars with Luisa x 2 --  GOAL NOT MET 5/09: x 5' in // bars TD    PLAN OF CARE/SAFETY  Plan  Plan: 5-7 times per week  Plan weeks: 3 weeks  Current Treatment Recommendations: Strengthening;Balance training;Functional mobility training;Transfer training; Endurance training;Gait training;Neuromuscular re-education;Home exercise program;Safety education & training;Patient/Caregiver education & training; Wheelchair mobility training;Equipment evaluation, education, & procurement;Positioning;Manual Therapy - Soft Tissue Mobilization; Therapeutic activities  Safety Devices  Type of Devices: All annemarie prominences offloaded; All fall risk precautions in place; Bed alarm in place;Call light within reach; Patient at risk for falls; Left in bed;Nurse notified      Therapy Time   Individual Concurrent Group Co-treatment   Time In 0930         Time Out 1000         Minutes 30           Timed Code Treatment Minutes: 30 Minutes      Second Session Therapy Time:   Individual Concurrent Group Co-treatment   Time In 0930     1400   Time Out 1000     1500   Minutes 30     60   Timed Code Treatment Minutes:  60    Total Treatment Minutes:  [address] [facility],  [address], PT, DPT  05/11/22 at 10:06 AM

## 2022-05-11 NOTE — PROGRESS NOTES
Hospitalist Progress Note      PCP: Yonatan Lezama MD    Date of Admission: 4/13/2022    Chief Complaint: Encephalopathy    Subjective: no new c/o.         Medications:  Reviewed    Infusion Medications    sodium chloride      sodium chloride Stopped (05/11/22 0849)    dextrose       Scheduled Medications    sodium chloride flush  5-40 mL IntraVENous 2 times per day    sodium chloride flush        [START ON 5/12/2022] fluconazole  100 mg Oral Daily    sucralfate  1 g Oral 4 times per day    pantoprazole  40 mg Oral BID AC    tiZANidine  4 mg Oral TID    polyethylene glycol  17 g Oral Daily    NIFEdipine  30 mg Oral BID    metoprolol succinate  50 mg Oral BID    magnesium oxide  400 mg Oral BID    QUEtiapine  100 mg Oral Nightly    QUEtiapine  25 mg Oral BID    mineral oil-hydrophilic petrolatum   Topical Daily    insulin lispro  0-18 Units SubCUTAneous TID WC    insulin lispro  0-9 Units SubCUTAneous Nightly    insulin glargine  25 Units SubCUTAneous Nightly    metoclopramide  10 mg Oral 4x Daily AC & HS    fluticasone  2 spray Each Nostril Nightly    heparin (porcine)  5,000 Units SubCUTAneous 3 times per day    PARoxetine  20 mg Oral Daily    multivitamin  1 tablet Oral Daily    tamsulosin  0.4 mg Oral Nightly     PRN Meds: sodium chloride flush, sodium chloride, promethazine, senna, haloperidol, QUEtiapine, oxyCODONE, oxyCODONE, prochlorperazine, ondansetron, ondansetron, hydrALAZINE, acetaminophen, diclofenac sodium, glucose, glucagon (rDNA), dextrose, bisacodyl, aluminum & magnesium hydroxide-simethicone, dextrose bolus **OR** dextrose bolus      Intake/Output Summary (Last 24 hours) at 5/11/2022 1842  Last data filed at 5/11/2022 1745  Gross per 24 hour   Intake 840 ml   Output 1590 ml   Net -750 ml       Physical Exam Performed:    /87   Pulse 87   Temp 97.2 °F (36.2 °C) (Oral)   Resp 16   Ht 6' 0.5\" (1.842 m)   Wt 230 lb (104.3 kg)   SpO2 99%   BMI 30.76 kg/m² General appearance: No apparent distress, appears stated age and cooperative. HEENT: Pupils equal, round, and reactive to light. Conjunctivae/corneas clear. Neck: Supple, with full range of motion. No jugular venous distention. Trachea midline. Respiratory:  Normal respiratory effort. Clear to auscultation, bilaterally without Rales/Wheezes/Rhonchi. Cardiovascular: Regular rate and rhythm with normal S1/S2 without murmurs, rubs or gallops. Abdomen: Soft, non-tender, non-distended with normal bowel sounds. Musculoskeletal: No clubbing, cyanosis or edema bilaterally. Full range of motion without deformity other than bilateral toe amputations. Skin: Skin color, texture, turgor normal.  No rashes or lesions other than to L hand. Neurologic:  Neurovascularly intact without any focal sensory/motor deficits. Cranial nerves: II-XII intact, grossly non-focal.  Psychiatric: Alert and oriented, thought content appropriate, normal insight  Capillary Refill: Brisk,< 3 seconds   Peripheral Pulses: +2 palpable, equal bilaterally       Labs:   Recent Labs     05/09/22  0708   WBC 6.6   HGB 9.1*   HCT 25.8*        Recent Labs     05/09/22  0708      K 3.8      CO2 23   BUN 10   CREATININE 1.1   CALCIUM 10.5     No results for input(s): AST, ALT, BILIDIR, BILITOT, ALKPHOS in the last 72 hours. No results for input(s): INR in the last 72 hours. No results for input(s): [place] in the last 72 hours.     Urinalysis:      Lab Results   Component Value Date    NITRU Negative 04/27/2022    WBCUA 21-50 04/27/2022    BACTERIA Rare 01/22/2022    RBCUA 3-4 04/27/2022    BLOODU TRACE-INTACT 04/27/2022    SPECGRAV 1.010 04/27/2022    GLUCOSEU Negative 04/27/2022       Consults:    IP CONSULT TO CASE MANAGEMENT  IP CONSULT TO DIETITIAN  IP CONSULT TO SOCIAL WORK  IP CONSULT TO NEPHROLOGY  IP CONSULT TO INFECTIOUS DISEASES  IP CONSULT TO PSYCHIATRY  IP CONSULT TO HOSPITALIST  IP CONSULT TO GI      Assessment/Plan:    Active Hospital Problems    Diagnosis     Moderate malnutrition ([facility] [address] [E44.0]     Critical 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 No overt signs of infection- infection ruled out per ID. CT head non acute. Held neurontin, zanaflex, desyrel. Psych recs appreciated. Started on seroquel with improvement- continue med. Mentation improved to baseline. Resolved       RODRICK: likely prerenal. Improved with IVF. Cr normalized. Nephro signed off.     Multiple wounds: continue wound care      HTN - w/out known CAD and no evidence of active signs/sxs of ischemia/failure. Currently controlled on home meds w/ vitals reviewed and documented as above.      PAF - now in SR, no AC d/t bleeding risk     MSSA bacteremia -  with left foot abscess, left hand abscess, osteomyelitis, RUL abscess. Completed ABX      Anemia:  recent GIB s/p embolization of gastroduodenal artery on 2/25 and ex-lap. Monitor hgb and transfuse if <7     N/V :  Improving. Possible focal ileus on left side of abdomen was noted on KUB. Lipase was normal. CT abd/pelvis non acute - no ileus or obstruction noted. GI consulted and appreciated s/p EGD Wed 11 May w/ dilation and Candida esophagitis - started on Diflucan. Continue supportive care with antiemetics PRN, PPI      Constipation:  s/p enema and has been having BM's. Seems resolved. Continue bowel regimen.      DMII: BG fairly controlled. Continue insulin        DVT Prophylaxis: SQ Heparin    Recent Labs     05/09/22  0708        Diet: ADULT DIET; Regular  Code Status: Full Code      PT/OT Eval Status: in ARU    Dispo - anticipate no medical obstacles to discharge. Wife Charline Conklin of 13 years NOT present at bedside when seen.        Kevin Pryor MD

## 2022-05-11 NOTE — PROGRESS NOTES
[facility] Nurse  Follow-up Progress Note       NAME:  Chrissy Amor  MEDICAL RECORD NUMBER:  [id number]  AGE:  42 y.o. GENDER:  male  :  1975  TODAY'S DATE:  2022    Subjective: My shoulder hurts. I'm O.K. if I can relax a bit. Wound Identification:  Wound Type: traumatic left foot.  Healing stage 4 pressure injury coccyx/sacrum.  Left hand posterior old burn area open.     Contributing Factors:  edema, diabetes, chronic pressure, decreased mobility, shear force, obesity, incontinence of stool and incontinence of urine        Patient Goal of Care:  [x] Wound Healing  [] Odor Control  [] Palliative Care  [x] Pain Control   [] Other:     Objective: Leaning  toward right side. Dressing intact to right foot and sacrum, and left hand area crusted over with scant drainage, serosanguinous, open to air. /87   Pulse 87   Temp 97.2 °F (36.2 °C) (Oral)   Resp 16   Ht 6' 0.5\" (1.842 m)   Wt 230 lb (104.3 kg)   SpO2 99%   BMI 30.76 kg/m²   Shimon Risk Score: Shimon Scale Score: 14     : Assessment: Sacrum wound pink granulation forming in bed of wound. Scant serosanguinous discharge. Left foot patricia area pink, incision crusty, dry discharge. Left hand dry scab with scant serosanguinous discharge. Measurements:  Negative Pressure Wound Therapy Coccyx (Active)   $ Standard NPWT <=50 sq cm PER TX $ Yes 22 1341   $ Standard NPWT >50 sq cm PER TX $ Yes 22 1605   Wound Type Pressure ulcer: Stage IV 22 1341   Unit Type [employer] [id number] [id number] 22 1341   Dressing Type Black Foam 22 1341   Number of pieces used 1 22 1341   Number of pieces removed 1 22 1341   Cycle Continuous 22 1341   Target Pressure (mmHg) 125 22 1341   Intensity 1 22 1341   Canister changed?  No 22 1341   Dressing Status Clean, dry & intact 22 1341   Dressing Changed Changed/New 22 1341   Drainage Amount Scant 05/11/22 1341   Drainage Description Serosanguinous 05/11/22 1341   Dressing Change Due 05/13/22 05/11/22 1341   Output (ml) 50 ml 04/22/22 1704   Wound Assessment Red;Pink;Granulation tissue 05/11/22 1341   Shape oval 05/11/22 1341   Odor None 05/11/22 1341   Number of days: 98       Wound 01/24/22 Foot Left;Dorsal I & D to left dorsal foot by Dr. Daphnie Vasquez, surgical wound (Active)   Number of days: 107       Wound 01/30/22 Sacrum SDTI (Active)   Wound Image   05/09/22 1234   Wound Etiology Pressure Stage 4 05/11/22 1341   Dressing Status Clean;Dry; Intact 05/11/22 1341   Wound Cleansed Cleansed with saline 05/11/22 1341   Dressing/Treatment Negative pressure wound therapy; Hydrocolloid 05/11/22 1341   Offloading for Diabetic Foot Ulcers Offloading ordered 05/11/22 1341   Dressing Change Due 05/11/22 05/10/22 2206   Wound Length (cm) 7.5 cm 05/09/22 1234   Wound Width (cm) 4.5 cm 05/09/22 1234   Wound Depth (cm) 0.5 cm 05/09/22 1234   Wound Surface Area (cm^2) 33.75 cm^2 05/09/22 1234   Change in Wound Size % (l*w) -68.75 05/09/22 1234   Wound Volume (cm^3) 16.875 cm^3 05/09/22 1234   Wound Healing % -24 05/09/22 1234   Distance Tunneling (cm) 0 cm 05/09/22 1234   Tunneling Position ___ O'Clock 0 05/09/22 1234   Undermining Starts ___ O'Clock 0 05/09/22 1234   Undermining Ends___ O'Clock 0 05/09/22 1234   Undermining Maxium Distance (cm) 0 05/09/22 1234   Wound Assessment Pink/red;Pale granulation tissue 05/11/22 1341   Drainage Amount Scant 05/11/22 1341   Drainage Description Serosanguinous 05/11/22 1341   Odor None 05/11/22 1341   Shanita-wound Assessment Blanchable erythema 05/11/22 1341   Margins Attached edges; Defined edges 05/11/22 1341   Wound Thickness Description not for Pressure Injury Full thickness 05/11/22 1341   Number of days: 101   Sacrum       Wound 04/29/22 Hand Left;Posterior (Active)   Wound Image   05/11/22 1341   Wound Etiology Other 05/11/22 1341   Dressing Status Clean;Dry; Intact 05/11/22 1341   Wound Cleansed Not Cleansed 05/11/22 1341   Dressing/Treatment Foam 05/11/22 1341   Offloading for Diabetic Foot Ulcers Offloading ordered 05/09/22 1234   Dressing Change Due 05/11/22 05/09/22 1234   Wound Length (cm) 1.5 cm 05/09/22 1234   Wound Width (cm) 2 cm 05/09/22 1234   Wound Depth (cm) 0.1 cm 05/09/22 1234   Wound Surface Area (cm^2) 2.55 cm^2 05/09/22 1234   Change in Wound Size % (l*w) 71.67 05/09/22 1234   Wound Volume (cm^3) 0.255 cm^3 05/09/22 1234   Wound Healing % 49 05/09/22 1234   Distance Tunneling (cm) 0 cm 05/06/22 1317   Tunneling Position ___ O'Clock 0 05/06/22 1317   Undermining Starts ___ O'Clock 0 05/06/22 1317   Undermining Ends___ O'Clock 0 05/06/22 1317   Undermining Maxium Distance (cm) 0 05/06/22 1317   Wound Assessment Eschar dry;Pink/red 05/11/22 1341   Drainage Amount Scant 05/11/22 1341   Drainage Description Serosanguinous 05/11/22 1341   Odor None 05/11/22 1341   Shanita-wound Assessment Blanchable erythema 05/11/22 1341   Margins Unattached edges 05/11/22 1341   Wound Thickness Description not for Pressure Injury Partial thickness 05/11/22 1341   Number of days: 12   Left posterior hand      Incision 03/08/22 Foot Anterior; Left (Active)   Wound Image   05/11/22 1341   Dressing Status Clean;Dry; Intact 05/11/22 1341   Dressing Change Due 05/13/22 05/11/22 1341   Incision Cleansed Cleansed with saline 05/11/22 1341   Dressing/Treatment Moist to moist;Tegaderm/transparent film dressing 05/11/22 1341   Incision Length (cm) 2 05/11/22 1341   Incision Width (cm) 5 cm 05/11/22 1341   Incision Depth (cm) 0.1 cm 05/11/22 1341   Margins Approximated 05/11/22 1341   Incision Assessment Other (Comment) 05/09/22 1234   Drainage Amount None 05/11/22 1341   Drainage Description Serosanguinous 05/09/22 1234   Odor None 05/11/22 1341   Shanita-incision Assessment Blanchable erythema 05/11/22 1341   Number of days: 64         Response to treatment:  Well tolerated by patient.      Pain Assessment:  Severity:  5 / 10  Quality of pain: aching  Right shoulder  Wound Pain Timing/Severity: intermittent  Premedicated: Yes  Plan:   Plan of Care: Wound 01/30/22 Sacrum SDTI-Dressing/Treatment: Skippy Christian film,Hydrocolloid,Negative pressure wound therapy  [REMOVED] Wound 02/14/22 Head Left; Lower; Posterior Friction/pressure wound-unstageable. 4/14/22 healed now, will complete LDA per CWOCN-Dressing/Treatment: Open to air  [REMOVED] Wound 01/24/22 Hand Left;Dorsal-Dressing/Treatment: Open to air  Wound 04/29/22 Hand Left;Posterior-Dressing/Treatment: Alginate with Ag,Hydrocolloid     Recommendations;   Current dressing removed.  Cleaned wounds with normal saline.  One black sponge placed in coccyx and tracked up over left hip to abdomen. Left buttocks covered with duoderm dressing.   Connected to 125 mmHg low continuous suction.  Wound care to continue to follow.  Call Wound care for problems with seal at [phone] or call [phone] and leave message. Thanks     Recommend:   Clean feet and legs with warm soapy water, foamy cleanser or bath wipes daily.  Pat dry.  Apply aquapor ointment to feet and legs daily.   Clean left foot wound with normal saline. Apply moist to moist dressing. Change BID.    Cleanse left posterior hand with normal saline, foam dressing, change foam every 3 days.   Change with VAC dressing M-W-F. Continue NPWT dressing to coccyx wound change 3 times a week. Dr. Suzette Bennett notified for recommendation on left hand old burn area. Specialty Bed Required : Yes [place]  [x] Low Air Loss   [x] Pressure Redistribution  [] Fluid Immersion  [] Bariatric  [] Total Pressure Relief  [] Other:     Current Diet: ADULT DIET;  Regular  Dietician consult:  Yes    Discharge Plan:  Placement for patient upon discharge: skilled nursing   Patient appropriate for Outpatient [address] [address]: Yes    Referrals:  [x]    following  [] [address] [facility]  [] Supplies  [] Other    Patient/Caregiver Teaching: To reposition from side to side to prevent pressure areas from forming.    Level of patient/caregiver understanding able to:   [] Indicates understanding       [x] Needs reinforcement  [] Unsuccessful      [x] Verbal Understanding  [] Demonstrated understanding       [] No evidence of learning  [] Refused teaching         [] N/A       Electronically signed by Chi Maravilla RN, on 5/11/2022 at 1:34 PM

## 2022-05-11 NOTE — H&P
Gastroenterology Preop Assessment    Patient:   Miriam Stockton   :    1975   Facility:   [facility]  Referring/PCP: Maryan Godinez MD  Date:     2022    Subjective:   Procedure: egd    HPI/Reason for procedure:  Nausea, decreased appetite    Past Medical History:   Diagnosis Date    Abscess of left foot 2022    Abscess of left hand     Acute encephalopathy     Acute hypoxemic respiratory failure ([address]     Acute osteomyelitis of left hand ([address] 2022    Acute osteomyelitis of right hallux ([address] 2019    Cardiopulmonary arrest ([address]     Cellulitis of left foot 2020    CHF (congestive heart failure) ([address] 2014    presumably from viral myocarditis    Chronic osteomyelitis of left foot ([address] 10/27/2020    Closed displaced fracture of distal phalanx of right little finger 2021    Clostridium difficile infection 2016    Diabetic ulcer of left forefoot associated with type 2 diabetes mellitus, with necrosis of bone ([address] 2019    Diabetic ulcer of right great toe associated with type 2 diabetes mellitus, with necrosis of bone ([address] 2019    Enterococcal infection 2/3/2022    Failed soft 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 toe amputation site 10/26/2020    Hemodialysis patient [facility])     acute dialysis while in hospital. resolved    History of blood transfusion     History of hyperbaric oxygen therapy 10/28/2020    DFU- carmichael 3 - compromised flap    Hyperlipidemia     Hypertension     Infective tenosynovitis of extensor tendons of left hand 2/3/2022    Migraine     MSSA bacteremia 2022    Possible perforated tympanic membrane     as a result of recurrent otitis    Post-op hematoma of left foot 2020    Recurrent otitis media     Septic shock (HCC)     Staphylococcal arthritis of left foot ([address] 2022    Syncope     Tear of medial meniscus of left knee     Tobacco use     Toe osteomyelitis, left ([address] 2020    VAP (ventilator-associated pneumonia) [facility])      Past Surgical History:   Procedure Laterality Date    ARM SURGERY Left 2022    LEFT HAND INCISION AND DRAINAGE performed by Jessica Mendoza MD at [address], ESOPHAGUS      bleeding ulcer correction    ENDOSCOPY, COLON, DIAGNOSTIC      FOOT DEBRIDEMENT Left 2022    ULCER DEBRIDEMENT LEFT FOOT performed by Bacilio Walker DPM at [address] Left 3/8/2022    ULCER DEBRIDEMENT LEFT FOOT performed by Bacilio Walker DPM at [facility] [address] Right 2022    successful coil embolization of the gastroduodenal artery    IR NONTUNNELED VASCULAR CATHETER  2022    IR NONTUNNELED VASCULAR CATHETER 2022 [facility]    KNEE ARTHROSCOPY Left 08/15/2013    LEFT KNEE ARTHROSCOPY , SYNOVECTOMY       LAPAROTOMY N/A 2022    PYLOROPLASTY, ULCER OVER-SEW, JEJUNOSTOMY TUBE INSERTION performed by Merari Benavides MD at [place] [address] Left 2020    PARTIAL LEFT FOOT AMPUTATION    TOE AMPUTATION Right 2019    PARTIAL RIGHT FOOT AMPUTATION performed by Bacilio Walker DPM at [address] [facility] [address] Left 2020    PARTIAL LEFT FOOT AMPUTATION performed by Bacilio Walker DPM at [address] [facility] [address] Left 10/22/2020    PARTIAL LEFT FOOT AMPUTATION WITH GRAFT APPLICATION performed by Bacilio Walker DPM at [address] N/A 2022    EGD W/ANES.  performed by Govind Dubon MD at [address] [place] [address] N/A 2022    EGD DIAGNOSTIC ONLY performed by Paula Floyd MD at [facility] [address] EXTRA[address]         Social:   Social History     Tobacco Use    Smoking status: Former Smoker     Packs/day: 0.50     Years: 2.00     Pack years: 1.00     Quit date: 2005     Years since quittin.7    Smokeless tobacco: Former User     Quit date: 8/13/2005    Tobacco comment: SMOKED OCCASIONALLY UNTIL 8 YEARS AGO   Substance Use Topics    Alcohol use: Not Currently     Comment: RARELY     Family:   Family History   Problem Relation Age of Onset    Diabetes Mother     High Blood Pressure Mother     High Cholesterol Mother     Diabetes Father     High Blood Pressure Father     High Cholesterol Father     Stroke Father     High Blood Pressure Sister     High Blood Pressure Maternal Uncle     High Cholesterol Maternal Uncle     High Blood Pressure Maternal Grandmother        Scheduled Medications:    sodium chloride flush        tiZANidine  4 mg Oral TID    polyethylene glycol  17 g Oral Daily    NIFEdipine  30 mg Oral BID    metoprolol succinate  50 mg Oral BID    magnesium oxide  400 mg Oral BID    QUEtiapine  100 mg Oral Nightly    QUEtiapine  25 mg Oral BID    mineral oil-hydrophilic petrolatum   Topical Daily    insulin lispro  0-18 Units SubCUTAneous TID WC    insulin lispro  0-9 Units SubCUTAneous Nightly    insulin glargine  25 Units SubCUTAneous Nightly    metoclopramide  10 mg Oral 4x Daily AC & HS    fluticasone  2 spray Each Nostril Nightly    heparin (porcine)  5,000 Units SubCUTAneous 3 times per day    pantoprazole  40 mg Oral BID    PARoxetine  20 mg Oral Daily    multivitamin  1 tablet Oral Daily    tamsulosin  0.4 mg Oral Nightly       Current Medications:    Prior to Admission medications    Medication Sig Start Date End Date Taking?  Authorizing Provider   aluminum & magnesium hydroxide-simethicone (MAALOX) 200-200-20 MG/5ML SUSP suspension Take 30 mLs by mouth every 6 hours as needed for Indigestion 5/6/22  Yes Georgiana Chavira MD   heparin, porcine, 5000 UNIT/ML injection Inject 1 mL into the skin every 8 hours 5/6/22  Yes Georgiana Chavira MD   PARoxetine (PAXIL) 10 MG tablet Take 2 tablets by mouth every morning 5/6/22  Yes Georgiana Chavira MD   insulin glargine (LANTUS SOLOSTAR) 100 UNIT/ML injection pen Inject 25 Units into the skin nightly 5/6/22  Yes Claudette Rich MD   ondansetron (ZOFRAN-ODT) 4 MG disintegrating tablet Take 1 tablet by mouth every 8 hours as needed for Nausea or Vomiting 5/6/22  Yes Claudette Rich MD   promethazine (PHENERGAN) 12.5 MG tablet Take 1 tablet by mouth every 6 hours as needed for Nausea 5/6/22 5/13/22 Yes Claudette Rich MD   prochlorperazine (COMPAZINE) 5 MG tablet Take 1 tablet by mouth every 6 hours as needed for Nausea 5/6/22  Yes Claudette Rich MD   QUEtiapine (SEROQUEL) 25 MG tablet Take 25 mg in the morning and afternoon and 100 mg at night until 5/17. Then take 12.5 mg in the morning and afternoon and 50 mg at night for 3 days, then stop. 5/6/22  Yes Claudette Rich MD   metoprolol succinate (TOPROL XL) 50 MG extended release tablet Take 1 tablet by mouth 2 times daily 5/6/22  Yes Claudette Rich MD   NIFEdipine (PROCARDIA XL) 30 MG extended release tablet Take 1 tablet by mouth 2 times daily 5/6/22  Yes Claudette Rich MD   fluticasone [facility]) 50 MCG/ACT nasal spray 2 sprays by Each Nostril route at bedtime 5/6/22  Yes Claudette Rich MD   diclofenac sodium (VOLTAREN) 1 % GEL Apply 4 g topically 4 times daily as needed for Pain 5/6/22  Yes Claudette Rich MD   mineral oil-hydrophilic petrolatum (AQUAPHOR) ointment Apply topically as needed.  5/7/22  Yes Claudette Rich MD   bisacodyl (DULCOLAX) 10 MG suppository Place 1 suppository rectally daily as needed for Constipation 5/6/22 6/5/22 Yes Claudette Rich MD   polyethylene glycol [facility]) 17 g packet Take 17 g by mouth daily 5/7/22 6/6/22 Yes Claudette Rich MD   [facility]) 8.6 MG tablet Take 2 tablets by mouth nightly as needed (constipation) 5/6/22 6/5/22 Yes Claudette Rich MD   magnesium oxide (MAG-OX) 400 (240 Mg) MG tablet Take 1 tablet by mouth 2 times daily 5/6/22  Yes Claudette Rich MD   tamsulosin [facility]) 0.4 MG capsule Take 1 capsule by mouth at bedtime 5/6/22  Yes Bimal De León MD   metoclopramide (REGLAN) 10 MG tablet Take 1 tablet by mouth 4 times daily (before meals and nightly) 5/6/22  Yes Bimal De León MD   Multiple Vitamins-Minerals (THERAPEUTIC MULTIVITAMIN-MINERALS) tablet Take 1 tablet by mouth daily 5/7/22  Yes Bimal De León MD   tiZANidine (ZANAFLEX) 4 MG tablet Take 2 tablets by mouth every 6 hours as needed (muscle spasm) 5/6/22  Yes Bimal De León MD   insulin lispro (HUMALOG) 100 UNIT/ML injection vial Inject 0-18 Units into the skin 4 times daily (before meals and nightly) 3/10/22   Anyi Arias MD   pantoprazole (PROTONIX) 40 MG tablet Take 1 tablet by mouth 2 times daily (before meals) 3/10/22   Anyi Arias MD   blood glucose test strips (ONETOUCH ULTRA) strip USE TO TEST BLOOD GLUCOSE DAILY. DX:E11.9 3/5/21   Maria Antonia Capone MD   Blood Glucose Monitoring Suppl (CONTOUR NEXT EZ) w/Device KIT Use to test glucose daily. DX:E11.9 12/10/20   Maria Antonia Capone MD   Insulin Pen Needle 32G X 6 MM MISC Use with insulin daily . DX:E11.9 12/4/20   Maria Antonia Capone MD   blood glucose monitor strips Use to test blood sugar daily.   DX:E11.9 12/4/20   Maria Antonia Capone MD         Current Facility-Administered Medications:     sodium chloride flush 0.9 % injection, , , ,     0.9 % sodium chloride infusion, , IntraVENous, Continuous, Dinora Ramirez, DO, Last Rate: 100 mL/hr at 05/11/22 0735, New Bag at 05/11/22 0735    tiZANidine (ZANAFLEX) tablet 4 mg, 4 mg, Oral, TID, Bimal De León MD, 4 mg at 05/10/22 2213    polyethylene glycol (GLYCOLAX) packet 17 g, 17 g, Oral, Daily, Neena Willis MD, 17 g at 05/07/22 0851    NIFEdipine (PROCARDIA XL) extended release tablet 30 mg, 30 mg, Oral, BID, Neena Willis MD, 30 mg at 05/10/22 2210    metoprolol succinate (TOPROL XL) extended release tablet 50 mg, 50 mg, Oral, BID, Neena Willis MD, 50 mg at 05/10/22 2213    promethazine (PHENERGAN) tablet 12.5 mg, 12.5 mg, Oral, Q6H PRN, Roberto Stephens MD, 12.5 mg at 05/10/22 0749    senna (SENOKOT) tablet 17.2 mg, 2 tablet, Oral, Nightly PRN, Roberto Stephens MD    haloperidol (HALDOL) tablet 0.5 mg, 0.5 mg, Oral, Q6H PRN, Roberto Stephens MD    magnesium oxide (MAG-OX) tablet 400 mg, 400 mg, Oral, BID, Roberto Stephens MD, 400 mg at 05/10/22 2213    QUEtiapine (SEROQUEL) tablet 100 mg, 100 mg, Oral, Nightly, Isael Thornton MD, 100 mg at 05/10/22 2213    QUEtiapine (SEROQUEL) tablet 25 mg, 25 mg, Oral, BID, Isael Thornton MD, 25 mg at 05/10/22 0749    QUEtiapine (SEROQUEL) tablet 25 mg, 25 mg, Oral, Q4H PRN, Isael Thornton MD, 25 mg at 04/30/22 0258    mineral oil-hydrophilic petrolatum (AQUAPHOR) ointment, , Topical, Daily, Elvira Austin MD, Given at 05/10/22 0750    insulin lispro (HUMALOG) injection vial 0-18 Units, 0-18 Units, SubCUTAneous, TID WC, Roberto Stephens MD, 3 Units at 05/10/22 1634    insulin lispro (HUMALOG) injection vial 0-9 Units, 0-9 Units, SubCUTAneous, Nightly, Roberto Stephens MD, 3 Units at 05/10/22 2216    insulin glargine (LANTUS) injection vial 25 Units, 25 Units, SubCUTAneous, Nightly, Elvira Austin MD, 25 Units at 05/10/22 2216    oxyCODONE (ROXICODONE) immediate release tablet 5 mg, 5 mg, Oral, Q4H PRN, Roberto Stephens MD, 5 mg at 05/09/22 1220    oxyCODONE (ROXICODONE) immediate release tablet 10 mg, 10 mg, Oral, Q4H PRN, Roberto Stephens MD, 10 mg at 05/11/22 0244    prochlorperazine (COMPAZINE) tablet 5 mg, 5 mg, Oral, Q6H PRN, Roberto Stephens MD, 5 mg at 05/08/22 0938    metoclopramide (REGLAN) tablet 10 mg, 10 mg, Oral, 4x Daily AC & HS, Roberto Stephens MD, 10 mg at 05/11/22 0556    ondansetron (ZOFRAN-ODT) disintegrating tablet 4 mg, 4 mg, Oral, Q8H PRN, Roberto Stephens MD, 4 mg at 05/05/22 0531    ondansetron [facility] injection 4 mg, 4 mg, IntraVENous, Q6H PRN, Estevan Mckinney MD, 4 mg at 05/10/22 0555    hydrALAZINE (APRESOLINE) tablet 10 mg, 10 mg, Oral, Q2H PRN, Estevan Mckinney MD    acetaminophen (TYLENOL) tablet 650 mg, 650 mg, Oral, Q6H PRN, Estevan Mckinney MD, 650 mg at 05/01/22 0248    diclofenac sodium (VOLTAREN) 1 % gel 4 g, 4 g, Topical, 4x Daily PRN, Estevan Mckinney MD, 4 g at 05/04/22 0145    fluticasone (FLONASE) 50 MCG/ACT nasal spray 2 spray, 2 spray, Each Nostril, Nightly, Estevan Mckinney MD, 2 spray at 05/10/22 2215    heparin (porcine) injection 5,000 Units, 5,000 Units, SubCUTAneous, 3 times per day, Estevan Mckinney MD, 5,000 Units at 05/11/22 0557    glucose (GLUTOSE) 40 % oral gel 15 g, 15 g, Oral, PRN, Estevan Mckinney MD    glucagon (rDNA) injection 1 mg, 1 mg, IntraMUSCular, PRN, Estevan Mckinney MD    dextrose 5 % solution, 100 mL/hr, IntraVENous, PRN, Estevan Mckinney MD    bisacodyl (DULCOLAX) suppository 10 mg, 10 mg, Rectal, Daily PRN, Estevan Mckinney MD, 10 mg at 04/14/22 1515    aluminum & magnesium hydroxide-simethicone (MAALOX) 200-200-20 MG/5ML suspension 30 mL, 30 mL, Oral, Q6H PRN, Estevan Mckinney MD    pantoprazole (PROTONIX) tablet 40 mg, 40 mg, Oral, BID, Estevan Mckinney MD, 40 mg at 05/10/22 2213    PARoxetine (PAXIL) tablet 20 mg, 20 mg, Oral, Daily, Estevan Mckinney MD, 20 mg at 05/10/22 7256    therapeutic multivitamin-minerals 1 tablet, 1 tablet, Oral, Daily, Estevan Mckinney MD, 1 tablet at 05/10/22 1122    tamsulosin (FLOMAX) capsule 0.4 mg, 0.4 mg, Oral, Nightly, Estevan Mckinney MD, 0.4 mg at 05/10/22 2213    dextrose bolus (hypoglycemia) 10% 125 mL, 125 mL, IntraVENous, PRN **OR** dextrose bolus (hypoglycemia) 10% 250 mL, 250 mL, IntraVENous, PRN, Estevan Mckinney MD      Infusions:    sodium chloride 100 mL/hr at 05/11/22 0735    dextrose       PRN Medications: promethazine, senna, procedure; the patient understands and agrees to proceed.        Sasha Garcia,   5/11/2022

## 2022-05-11 NOTE — OP NOTE
Esophagogastroduodenoscopy Note    Patient:   Ashley Chen    YOB: 1975    Facility:     [address] [place] [address]   [Inpatient]   Referring/PCP: Loki Willingham MD    Procedure:   Esophagogastroduodenoscopy with biopsy, outlet dilation  Date:     5/11/2022   Endoscopist:  Philippe Elizabeth DO     Preoperative Diagnosis:   Decreased appetite, nausea    Postoperative Diagnosis:  Anastomotic ulcer    Anesthesia:  Versed 5 mg IV, fentanyl 100 mcg IV    Estimated blood loss: Minimal    Complications: None    Description of Procedure:  Informed consent was obtained from the patient after explanation of the procedure including indications, description of the procedure,  benefits and possible risks and complications of the procedure, and alternatives. Questions were answered. The patient's history was reviewed and a directed physical examination was performed prior to the procedure. Patient was monitored throughout the procedure with pulse oximetry and periodic assessment of vital signs. Patient was sedated as noted above. The Nursing staff and I performed a time out. With the patient in the left lateral decubitus position, the Olympus videoendoscope was placed in the patient's mouth and under direct visualization passed into the esophagus. The scope was ultimately passed to the second portion of the duodenum. Visualization was performed during both introduction and withdrawal of the endoscope and retroflexed view of the proximal stomach was obtained. Findings[de-identified]   Esophagus: normal. The findings do not support a diagnosis of Barbour's Esophagus. White fluffy infiltrates were seen in the proximal esophagus consistent with candida esophagitis. Biopsies were obtained. Stomach: normal.  Polyps were seen in the antrum. Biopsies were obtained of them and the surrounding mucosa. The gastric anastomosis was strictured. An ulcer and sutures were seen. The outlet was dilated serially from 10 mm to 12 mm with a balloon. Duodenum: Multiple flat polyps were seen. Biopsies were obtained.     Recommendations:  Diflucan for candidal esophagitis  BID PPI for 8 weeks and carafate 1 gram qid for 4 weeks  Continue short term course of metoclopramide  May need to consider surgical revision if no improvement    Erika Coffey,     [address]     Phone: [phone]     Fax: [phone]

## 2022-05-11 NOTE — PROGRESS NOTES
Carol Bhandari  5/11/2022  [id number]    Chief Complaint: Critical illness myopathy    Subjective:   No acute events overnight. Today Hawk is seen in his room with nursing. He had an EGD this morning that revealed candida esophagitis and stricture of gastric anastamosis. He reports that he is feeling improved. He denies any current nausea.      ROS: denies f/c, cp, sob    Objective:  Patient Vitals for the past 24 hrs:   BP Temp Temp src Pulse Resp SpO2   05/11/22 1000 137/87 -- -- 87 -- 99 %   05/11/22 0940 137/87 -- -- 87 -- --   05/11/22 0900 110/71 -- -- 84 16 99 %   05/11/22 0846 105/69 -- -- 85 16 96 %   05/11/22 0834 106/67 -- -- 88 17 97 %   05/11/22 0828 -- -- -- 86 -- 100 %   05/11/22 0827 -- -- -- 86 -- 100 %   05/11/22 0826 -- -- -- 86 -- 100 %   05/11/22 0825 -- -- -- 86 -- 100 %   05/11/22 0824 122/76 -- -- 86 -- 100 %   05/11/22 0823 -- -- -- 86 -- 100 %   05/11/22 0822 -- -- -- 86 -- 100 %   05/11/22 0821 -- -- -- 86 -- 100 %   05/11/22 0820 -- -- -- 86 -- 100 %   05/11/22 0819 137/82 -- -- 86 -- 100 %   05/11/22 0818 -- -- -- 90 -- 100 %   05/11/22 0817 -- -- -- 90 -- 100 %   05/11/22 0816 -- -- -- 90 -- 100 %   05/11/22 0815 -- -- -- 90 -- 100 %   05/11/22 0814 139/88 -- -- 90 -- 100 %   05/11/22 0813 -- -- -- 86 -- 100 %   05/11/22 0812 -- -- -- 86 -- 100 %   05/11/22 0811 -- -- -- 86 -- 100 %   05/11/22 0810 -- -- -- 86 -- 100 %   05/11/22 0809 139/80 -- -- 86 -- 100 %   05/11/22 0808 -- -- -- 89 -- 100 %   05/11/22 0807 -- -- -- 89 -- 100 %   05/11/22 0806 -- -- -- 89 -- 100 %   05/11/22 0805 -- -- -- 89 -- 100 %   05/11/22 0804 134/86 -- -- 89 -- 100 %   05/11/22 0803 -- -- -- 85 -- 100 %   05/11/22 0802 -- -- -- 85 -- 100 %   05/11/22 0645 111/61 97.2 °F (36.2 °C) Oral 81 16 96 %   05/11/22 0314 -- -- -- -- 16 --   05/10/22 2243 -- -- -- -- 16 --   05/10/22 2206 122/82 98 °F (36.7 °C) Oral 86 16 99 %   05/10/22 1611 -- -- -- -- 16 --   05/10/22 1505 (!) 155/93 97.7 °F (36.5 °C) -- 95 16 98 % Gen: NAD, in adrian  HEENT: Normocephalic, atraumatic  CV: extremities well perfused  Resp: No respiratory distress. Abd: Soft, nondistended  Ext: No edema   Neuro: Alert, oriented, speech fluent without aphasia. Delayed processing   Psych: appropriate mood and affect    Wt Readings from Last 3 Encounters:   05/06/22 230 lb (104.3 kg)   03/10/22 255 lb 1.6 oz (115.7 kg)   10/14/21 282 lb (127.9 kg)       Laboratory data:   Lab Results   Component Value Date    WBC 6.6 05/09/2022    HGB 9.1 (L) 05/09/2022    HCT 25.8 (L) 05/09/2022    MCV 91.0 05/09/2022     05/09/2022       Lab Results   Component Value Date     05/09/2022    K 3.8 05/09/2022     05/09/2022    CO2 23 05/09/2022    BUN 10 05/09/2022    CREATININE 1.1 05/09/2022    GLUCOSE 120 05/09/2022    CALCIUM 10.5 05/09/2022        Therapy progress:  PT  Position Activity Restriction  Other position/activity restrictions: RUE PICC, sacral wound vac, rea  Objective     Sit to Stand: Maximum Assistance,Dependent/Total,2 Person Assistance  Stand to sit: Maximum Assistance,Dependent/Total,2 Person Assistance  Bed to Chair: Dependent/Total     OT  PT Equipment Recommendations  Equipment Needed: No  Mobility Devices: Walker,Hospital Bed,Wheelchair (slideboard, ramp)  Walker: Rolling  Wheelchair: Wheelchair Cushion Pressure Relieving (swing away armrests and leg rests)  Hospital Bed : Electric - Full (Head of Bed),Bed Rails - Quadrant  Other: defer to SNF; if d/c to home from this setting, will require hospital bed, slideboard for car transfers, RW for standing to don pants, 22\" width w/c with swing away armrests and legrests, presesure relieving cushion, anti-tippers, and a ramp     Assessment        SLP  Current Diet : Regular  Current Liquid Diet : Thin  Diet Solids Recommendation: Regular  Liquid Consistency Recommendation: Thin    Body mass index is 30.76 kg/m².     Assessment and Plan:  Jaden Kessler is a 42year old male with a past medical history significant for DM2, diabetic foot ulcer with multiple amputations, HFrEF who has had a prolonged hospital course following Covid pneumonia with complications including respiratory failure, GIB, and multiple infections. He was admitted to [facility] on 4/13/22 due to functional deficits below his baseline.      Critical Illness Myopathy  - due to covid pneumonia  - PT, OT, ST     MSSA bactermia  - with left foot abscess, left hand abscess, osteomyelitis, RUL abscess  - transitioned to oral doxycycline, continued through 4/28     Multiple wounds POA  - wound vac to sacral wound and left foot wound  - wound care    Encephalopathy, improved  - etiology unclear. Workup not revealing of infectious, metabolic or intracranial etiology. - wbc, ammonia, electrolytes, lactic acid, and ABG within normal limits  - CT head negative for acute process  - ID following, low suspicion for infection  - possibly polypharmacy? Although patient had been stable on pain medications when hallucinations and encephalopathy developed. Have wean down and discontinued scheduled dilaudid.  - gabapentin and trazodone discontinue  - tizanidine had been held due to AMS, will try resuming  - seroquel per Psych  - mentation improved    Nausea and vomiting  - chronic problem, but has been intermittently interfering with therapies  - XR abd showing possible ileus. CT AP negative for ileus  - Medicine and GI following, appreciate assistance.  May need addition workup, pending recs  - continue PRN nausea medications    Esophageal Candidiasis  - seen on EGD 5/11  - diflucan  - PPI BID for 8 weeks  - carafate 1 g 4 weeks  - continue reglan    Gastric Anastomosis Stricture  - dilated on EGD 5/11     HFrEF  - EF 35%  - discontinued lasix due to RODRICK, patient remains euvolemic on exam  - daily weights     CKD  - RODRICK during acute stay due to sepsis, cardiac arrest, requiring dialysis  - recent RODRICK resolved with discontinuing lasix and giving fluids  - Nephrology following, appreciate assistance    Hyperkalemia, resolved  - suspect due to kidney dysfunction  - s/p lokemia 4/26  - Nephro following    Paroxysmal Atrial Fibrillation  - BB  - AC contraindicated due to bleeding risk    HTN  - lopressor 12.5 mg BID  - discontinued lisinopril     DM2 complicated by neuropathy  - home regimen lantus 75, prandial 15, SSI  - lantus 25 plus SSI     Diabetic Neuropathy  - gabapentin discontinued while patient was having AMS. AMS resolved, can consider reintroducing     Acute blood loss anemia  - recent GIB s/p embolization of gastroduodenal artery on 2/25 and ex-lap  - monitor and transfuse for Hb<7     History of GIB  - PPI      Urinary Retention  - flomax  - ISC with high volumes, rea replaced  - will need follow up with Urology     Chronic Pain  - discontinued dilaudid due to AMS  - oxycodone PRN  - schedule muscle relaxer and monitoring mental status      Bowel: Per protocol  Bladder: Per protocol  Sleep: Trazodone discontinued due to AMS  DVT PPx: heparin    Services/DME: home PT, OT, ST, nursing  EDOD: 5/16/22     Follow up appointments: PCP, Cardiology, wound care, Urology    Interdisciplinary team conference was held today with entire rehab treatment team including PT, OT, SLP, Dietician, RN, and SW. Discussion focused on progress toward rehab goals and discharge planning. Barriers: nausea, level of assist, comorbidities. Total treatment time >35 min with greater than 50% spent in care coordination.       Electronically signed by Lisseth Lockett MD on 5/11/2022 at 2:06 PM

## 2022-05-11 NOTE — PLAN OF CARE
Problem: Nutrition  Goal: Optimal nutrition therapy  Outcome: Progressing   Encouraged patient to improve oral nutrition intake. Educated on importance of good nutrition foods. Patient verbalized understanding and showed improvement of po intake this shift.

## 2022-05-11 NOTE — CARE COORDINATION
CM received telephone call from [place] with [facility] they have accepted patient. CM spoke with wife and she refused this facility. CM will speak to wife about sister facility of [facility]. Belinda Tobin, RN     1500 CM spoke with wife about [facility] referral she is okay with this but is waiting on insurance information on continued stay.  Belinda Tobin, RN

## 2022-05-12 NOTE — CARE COORDINATION
CM received a telephone call from admission coordinator for [facility] stating they denied patient. CM acknowledged and sent referral to [facility]. Colletta Daub, RN    1600 MATTHEW received a call from Jassi Hernandez stating that [facility] has accepted patient and will start precert today.  Colletta Daub, RN

## 2022-05-12 NOTE — PROGRESS NOTES
PROGRESS NOTE  S:46 yrs Patient  admitted on 4/13/2022 with Critical illness myopathy [G72.81] . States feeling a bit better    Current Hospital Schedued Meds   gabapentin  100 mg Oral TID    sodium chloride flush  5-40 mL IntraVENous 2 times per day    fluconazole  100 mg Oral Daily    sucralfate  1 g Oral 4 times per day    pantoprazole  40 mg Oral BID AC    tiZANidine  4 mg Oral TID    polyethylene glycol  17 g Oral Daily    NIFEdipine  30 mg Oral BID    metoprolol succinate  50 mg Oral BID    magnesium oxide  400 mg Oral BID    QUEtiapine  100 mg Oral Nightly    QUEtiapine  25 mg Oral BID    mineral oil-hydrophilic petrolatum   Topical Daily    insulin lispro  0-18 Units SubCUTAneous TID WC    insulin lispro  0-9 Units SubCUTAneous Nightly    insulin glargine  25 Units SubCUTAneous Nightly    metoclopramide  10 mg Oral 4x Daily AC & HS    fluticasone  2 spray Each Nostril Nightly    heparin (porcine)  5,000 Units SubCUTAneous 3 times per day    PARoxetine  20 mg Oral Daily    multivitamin  1 tablet Oral Daily    tamsulosin  0.4 mg Oral Nightly     Current Hospital IV Meds   sodium chloride      sodium chloride Stopped (05/11/22 0849)    dextrose       Current Hospital PRN Meds  sodium chloride flush, sodium chloride, promethazine, senna, haloperidol, QUEtiapine, oxyCODONE, oxyCODONE, prochlorperazine, ondansetron, ondansetron, hydrALAZINE, acetaminophen, diclofenac sodium, glucose, glucagon (rDNA), dextrose, bisacodyl, aluminum & magnesium hydroxide-simethicone, dextrose bolus **OR** dextrose bolus    Exam:   Vitals:    05/12/22 1028   BP:    Pulse:    Resp: 16   Temp:    SpO2:      I/O last 3 completed shifts:   In: 1080 [P.O.:1080]  Out: 2340 [Urine:2340]   General appearance: alert, appears stated age and cooperative  HEENT: PERRLA  Neck: no adenopathy, no carotid bruit, no JVD, supple, symmetrical, trachea midline and thyroid not enlarged, symmetric, no tenderness/mass/nodules  Lungs: clear to auscultation bilaterally  Heart: regular rate and rhythm, S1, S2 normal, no murmur, click, rub or gallop  Abdomen: soft, non-tender; bowel sounds normal; no masses,  no organomegaly  Extremities: extremities normal, atraumatic, no cyanosis or edema     Labs:  CBC:   Recent Labs     05/12/22  0650   WBC 7.6   HGB 9.6*   HCT 27.2*   MCV 92.5        BMP:   Recent Labs     05/12/22  0650      K 4.0      CO2 24   BUN 12   CREATININE 1.1     LIVER PROFILE: No results for input(s): AST, ALT, LIPASE, PROT, BILIDIR, BILITOT, ALKPHOS in the last 72 hours. Invalid input(s): AMYLASE,  ALB  PT/INR: No results for input(s): INR in the last 72 hours. Invalid input(s): PT    IMAGING:  No results found. Hospital Problems           Last Modified POA    * (Principal) Critical illness myopathy 4/13/2022 Yes    Moderate malnutrition (HCC) (Chronic) 4/14/2022 Yes         Impression:  43 yo male with DM, foot ulcer s/p amputations, Peptic ulcer s/p surgery, Covid pneumonia c/b critical illness myopathy. Had candidal esophagitis, anastomotic ulcer, stricture s/p dilation    Recommendation:  1. Continue bid ppi, carafate, diflucan  2. Expect continued improvement  3. Will follow peripherally. Please call with specific questions or concerns.       Dina Capone DO  4:36 PM 5/12/2022            [facility]    [address]      [address] [place] [address]     Phone: [phone]     Fax: [phone]

## 2022-05-12 NOTE — PROGRESS NOTES
Ann-Marie Solis  5/12/2022  [id number]    Chief Complaint: Critical illness myopathy    Subjective:   No acute events overnight. Today Hawk is seen in his room resting in bed. He reports that he is very tired. He denies any current nausea. He reports that his mood has been \"blah\". We discuss having psych see him and he is agreeable. ROS: denies f/c, cp, sob, n/v    Objective:  Patient Vitals for the past 24 hrs:   BP Temp Temp src Pulse Resp SpO2   05/12/22 1028 -- -- -- -- 16 --   05/12/22 0800 92/62 97.3 °F (36.3 °C) Oral 90 17 99 %   05/12/22 0205 -- -- -- -- 16 --   05/11/22 2103 -- -- -- -- 17 --   05/11/22 2030 127/76 97.9 °F (36.6 °C) Oral 78 17 99 %   05/11/22 1417 -- -- -- -- 16 --     Gen: NAD, supine in bed  HEENT: Normocephalic, atraumatic  CV: extremities well perfused  Resp: No respiratory distress. Abd: Soft, nondistended  Ext: No edema   Neuro: Alert, oriented, speech fluent without aphasia.  Delayed processing   Psych: appropriate mood and affect    Wt Readings from Last 3 Encounters:   05/06/22 230 lb (104.3 kg)   03/10/22 255 lb 1.6 oz (115.7 kg)   10/14/21 282 lb (127.9 kg)       Laboratory data:   Lab Results   Component Value Date    WBC 7.6 05/12/2022    HGB 9.6 (L) 05/12/2022    HCT 27.2 (L) 05/12/2022    MCV 92.5 05/12/2022     05/12/2022       Lab Results   Component Value Date     05/12/2022    K 4.0 05/12/2022     05/12/2022    CO2 24 05/12/2022    BUN 12 05/12/2022    CREATININE 1.1 05/12/2022    GLUCOSE 140 05/12/2022    CALCIUM 9.5 05/12/2022        Therapy progress:  PT  Position Activity Restriction  Other position/activity restrictions: RUE PICC, sacral wound vac, rea  Objective     Sit to Stand: Maximum Assistance,Dependent/Total,2 Person Assistance  Stand to sit: Maximum Assistance,Dependent/Total,2 Person Assistance  Bed to Chair: Dependent/Total     OT  PT Equipment Recommendations  Equipment Needed: No  Mobility Devices: Aon Friends Around (slideboard, ramp)  Walker: Rolling  Wheelchair: One [facility] Bed : Electric - Full (Head of Bed),Bed Rails - Quadrant  Other: defer to SNF; if d/c to home from this setting, will require hospital bed, slideboard for car transfers, RW for standing to don pants, 22\" width w/c with swing away armrests and legrests, presesure relieving cushion, anti-tippers, and a ramp     Assessment        SLP  Current Diet : Regular  Current Liquid Diet : Thin  Diet Solids Recommendation: Regular  Liquid Consistency Recommendation: Thin    Body mass index is 30.76 kg/m². Assessment and Plan:  Ned Luevano is a 42year old male with a past medical history significant for DM2, diabetic foot ulcer with multiple amputations, HFrEF who has had a prolonged hospital course following Covid pneumonia with complications including respiratory failure, GIB, and multiple infections. He was admitted to [facility] on 4/13/22 due to functional deficits below his baseline.      Critical Illness Myopathy  - due to covid pneumonia  - PT, OT, ST     MSSA bactermia  - with left foot abscess, left hand abscess, osteomyelitis, RUL abscess  - completed course of doxycycline 4/28     Multiple wounds POA  - wound vac to sacral wound and left foot wound  - wound care    Encephalopathy, improved  - etiology unclear. Workup not revealing of infectious, metabolic or intracranial etiology. - wbc, ammonia, electrolytes, lactic acid, and ABG within normal limits  - CT head negative for acute process  - ID following, low suspicion for infection  - possibly polypharmacy? Although patient had been stable on pain medications when hallucinations and encephalopathy developed.  Have wean down and discontinued scheduled dilaudid.  - gabapentin and trazodone discontinue  - tizanidine had been held due to AMS, will try resuming  - seroquel per Psych  - mentation improved    Nausea and vomiting, improved  - chronic problem, but has been intermittently interfering with therapies  - suspect recent worsening due to stricture and esophageal candidiasis  - XR abd showing possible ileus. CT AP negative for ileus  - Medicine and GI following, appreciate assistance. - continue PRN nausea medications    Esophageal Candidiasis  - seen on EGD 5/11  - diflucan  - PPI BID for 8 weeks  - carafate 1 g 4 weeks  - continue reglan    Gastric Anastomosis Stricture  - dilated on EGD 5/11     HFrEF  - EF 35%  - discontinued lasix due to RODRICK, patient remains euvolemic on exam  - daily weights     CKD  - RODRICK during acute stay due to sepsis, cardiac arrest, requiring dialysis  - recent RODRICK resolved with discontinuing lasix and giving fluids  - Nephrology following, appreciate assistance    Hyperkalemia, resolved  - suspect due to kidney dysfunction  - s/p lokemia 4/26  - Nephro following    Paroxysmal Atrial Fibrillation  - BB  - AC contraindicated due to bleeding risk    HTN  - lopressor 12.5 mg BID  - discontinued lisinopril     DM2 complicated by neuropathy  - home regimen lantus 75, prandial 15, SSI  - lantus 25 plus SSI     Diabetic Neuropathy  - gabapentin discontinued while patient was having AMS.  AMS resolved,   - resume gabapentin 100 mg TID and monitor for side effects     Acute blood loss anemia  - recent GIB s/p embolization of gastroduodenal artery on 2/25 and ex-lap  - monitor and transfuse for Hb<7     History of GIB  - PPI      Urinary Retention  - flomax  - ISC with high volumes, rea replaced  - consider voiding trial once more mobile  - will need follow up with Urology     Chronic Pain  - discontinued dilaudid due to AMS  - oxycodone PRN  - scheduled muscle relaxer   - start gabapentin     Bowel: Per protocol  Bladder: Per protocol  Sleep: Trazodone discontinued due to AMS  DVT PPx: heparin    Services/DME: home PT, OT, ST, nursing  EDOD: 5/16/22     Follow up appointments: PCP, Cardiology, wound care, Urology      Electronically signed by Cheyenne Yary Bell MD on 5/12/2022 at 12:15 PM

## 2022-05-12 NOTE — PROGRESS NOTES
Occupational Therapy  Facility/Department: [facility]  Rehabilitation Occupational Therapy Daily Treatment Note    Date: 22  Patient Name: Everardo Oliva       Room: 2744/4679-35  MRN: [id number]  Account: [de-identified]   : 1975  (41 y.o.) Gender: male                    Past Medical History:  has a past medical history of Abscess of left foot, Abscess of left hand, Acute encephalopathy, Acute hypoxemic respiratory failure ([address] Acute osteomyelitis of left hand ([address] Acute osteomyelitis of right hallux ([address] Cardiopulmonary arrest ([address] Cellulitis of left foot, CHF (congestive heart failure) ([address] Chronic osteomyelitis of left foot ([address] Closed displaced fracture of distal phalanx of right little finger, Clostridium difficile infection, Diabetic ulcer of left forefoot associated with type 2 diabetes mellitus, with necrosis of bone ([address] Diabetic ulcer of right great toe associated with type 2 diabetes mellitus, with necrosis of bone ([address] Enterococcal infection, 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 Past Surgical History:   has a past surgical history that includes Tonsillectomy; [place] tooth extraction; Knee arthroscopy (Left, 08/15/2013); Toe amputation (Right, 2019); other surgical history (Left, 2020); Toe amputation (Left, 2020); Toe amputation (Left, 10/22/2020); Foot Debridement (Left, 2022); Arm Surgery (Left, 2022);  IR NONTUNNELED VASCULAR CATHETER > 5 YEARS (02/11/2022); Upper gastrointestinal endoscopy (N/A, 02/17/2022); IR EMBOLIZATION HEMORRHAGE (Right, 02/25/2022); Upper gastrointestinal endoscopy (N/A, 2/27/2022); laparotomy (N/A, 2/27/2022); Foot Debridement (Left, 3/8/2022); Cholecystectomy; Dilatation, esophagus; and Endoscopy, colon, diagnostic. Restrictions  Restrictions/Precautions: General Precautions; Fall Risk  Other position/activity restrictions: RUE PICC, sacral wound vac, rea    Subjective  Subjective: Pt in bed, tired, feeling minimal light headedness, agreeable to OT session, pain 7/10 in buttocks, BP 90/55, HR 80 O2 sats 95%. Restrictions/Precautions: General Precautions; Fall Risk             Objective     Cognition  Overall Cognitive Status: Exceptions  Arousal/Alertness: Delayed responses to stimuli  Following Commands: Follows one step commands with repetition; Follows multistep commands with repitition  Attention Span: Attends with cues to redirect  Memory: Decreased recall of precautions;Decreased short term memory  Safety Judgement: Decreased awareness of need for assistance;Decreased awareness of need for safety  Problem Solving: Assistance required to correct errors made;Assistance required to implement solutions;Assistance required to generate solutions;Assistance required to identify errors made  Insights: Decreased awareness of deficits  Initiation: Requires cues for some  Sequencing: Requires cues for some  Orientation  Overall Orientation Status: Within Functional Limits         ADL  Grooming/Oral Hygiene  Assistance Level: Modified independent  Skilled Clinical Factors: seated in w/c, completed washing face, combing hair, and brushing teeth  Upper Extremity Dressing  Assistance Level: Set-up  Skilled Clinical Factors: sitting EOB  Lower Extremity Dressing  Equipment Provided: Reachers  Assistance Level: Maximum assistance  Skilled Clinical Factors: good use of reacher to thread BLEs into shorts, max A to stand but able to pull shorts over hips with cues in stance  Putting On/Taking Off Footwear  Assistance Level: Dependent  Skilled Clinical Factors: depA for donning socks     Functional Mobility  Device: Wheelchair  Activity: To/From bathroom  Assistance Level: Minimal assistance  Skilled Clinical Factors: to make turn to face sink  Bed Mobility  Overall Assistance Level: Stand By Assist  Additional Factors: With handrails; Head of bed raised; Increased time to complete;Verbal cues  Supine to Sit  Assistance Level: Stand by assist  Skilled Clinical Factors: HOB elevated use of bed rail  Transfers  Surface: From bed; Wheelchair  Additional Factors: Increased time to complete; Mat raised; With handrails;Verbal cues; Hand placement cues  Sit to Stand  Assistance Level: Maximum assistance  Skilled Clinical Factors: to stand to RW  Bed To/From Chair  Technique: Stand pivot  Assistance Level: Maximum assistance  Skilled Clinical Factors: with RW  Stand Pivot  Assistance Level: Maximum assistance  Skilled Clinical Factors: with RW         Assessment  Assessment  Assessment: First Session: Pt agreeable to OT session. Pt performed supine>sit with SBA with HOB elevated and use of bedrail. Pt sat on EOB to don shirt with setup and thread BLEs into shorts with use of reacher and SPV. Pt required mod/max A to stand to manage pants over hips but able to pull over hips with min A for standing balance. Pt required max A and 3 attempts to stand a second time from EOB to stand pivot to w/c, requiring min A for balance during pivoting. Pt completed 3 grooming tasks at sink in w/c with min A to manuever w/c into bathroom. Pt left with breakfast in w/c with all needs in reach. Second Session: Pt agreeable to OT/PT session and still in w/c on arrival. Pt completed stand pivot transfer w/c>mat table with max A +mod A to stand from w/c to RW and min A x2 to stand pivot.  Pt completed short ambulation in hallway with balance beam between legs to eliminate scissoring gait. Pt only able to take steps for 50 seconds and 3 feet and then 35 seconds and 2 feet and sitting down each time due to LE fatigue. /69, HR 94, O2 sats 99%. Pt completed slideboard transfer w/c<>car with min A x2 for sliding and max A to lift LEs in/out of car. Pt completed sit pivot transfer w/c<>BSC x2 with min A x2 and good ability to lift hips from seat to scoot laterally. Pt incontinent of bowel and requiring total assist for hygiene and pants management. Pt stood in [employer] for ~30 seconds, ~60 seconds, and ~15 seconds to allow OT to complete hygiene while PT assisted pt with balance. Pt returned to bed with use of Power Liens and max A to lift BLEs into bed. Pt required co-treat for PT to assist with balance at hips and cueing for AD management while OT assisted with hand placement and posture. Pt with poor activity tolerance and strength. Continue POC. Activity Tolerance: Patient limited by fatigue;Patient limited by endurance; Patient limited by pain  Discharge Recommendations: Subacute/Skilled Nursing Facility  OT Equipment Recommendations  Equipment Needed: Yes  Mobility Devices: ADL Assistive Devices  ADL Assistive Devices: Grab Bars - shower;Transfer Tub Bench;Long-handled Sponge; Toileting - Heavy Duty Commode  Other: bariatric BSC or BSC with padded seat. BSC needs drop arms. Safety Devices  Safety Devices in place: Yes  Type of devices: Call light within reach; Patient at risk for falls; All fall risk precautions in place;Gait belt;Nurse notified; Left in chair;Chair alarm in place    Patient Education  Education  Education Given To: Patient  Education Provided: ADL Function;Role of Therapy;Plan of Care;Equipment;Home Exercise Program;Precautions; Safety; Energy Conservation; Family Education  Education Provided Comments: improtance of OOB activities, repositioning strategies, skin integrity, Fx transfers with sit/stand pivot, use of a/e  Education Method: Demonstration;Verbal  Barriers to Learning: None  Education Outcome: Verbalized understanding;Demonstrated understanding    Plan  Plan  Times per Week: 5-7x per week  Current Treatment Recommendations: Strengthening;ROM;Balance training;Functional mobility training; Endurance training;Pain management; Safety education & training;Patient/Caregiver education & training;Positioning;Self-Care / ADL;Neuromuscular re-education; Wheelchair mobility training;Equipment evaluation, education, & procurement    Goals  Patient Goals   Patient goals : 1 week (4/21)-EXTEND TO 4/27  Short Term Goals  Time Frame for Short term goals: Pt will complete UB dressing with min A- GOAL MET; Pt SBA for UB dressing at EOB  Short Term Goal 1: Pt will complete LB dressing with mod A-NOT MET, max A x1 5/9  Short Term Goal 2: Pt will complete LB bathing with mod A-GOAL MET 4/26  Short Term Goal 3: Pt will complete UB bathing with SBA-GOAL MET 4/26  Short Term Goal 4: Pt will complete 3 grooming task at w/c level-GOAL MET 4/26  Short Term Goal 5: Pt will complete x20 reps of BUE HEP to improve UB strength/endurance for repositioning and UB ADLs- GOAL MET 4/20; Pt completing x20 BUE AROM  Long Term Goals  Time Frame for Long term goals : 3 weeks (5/05)  Long Term Goal 1: Pt will complete total body dressing with supv-GOAL PARTIALLY MET 5/5, setup/SPV UE, mod-maxA LE  Long Term Goal 2: Pt will complete total body bathing with supv-GOAL PARTIALLY MET 5/5, setup/SPV UE, mod-maxA LE  Long Term Goal 3: Pt will complete 3 grooming tasks at sink with mod I-GOAL MET 5/12/22 Pt completed 3 grooming tasks at sink with mod I.  Long Term Goal 4: Pt will perform functional transfers with mod I-DOWNGRADE TO MODA X1 due to inconsistent progress.  PARTIALLY MET_ mod-max Ax1 5/9    Therapy Time   Individual Concurrent Group Co-treatment   Time In 0730 0900   Time Out 0800     1000   Minutes 30     60   Timed Code Treatment Minutes: [place] Mejia Aliyah Walekr

## 2022-05-12 NOTE — PROGRESS NOTES
Comprehensive Nutrition Assessment    Type and Reason for Visit:  Reassess    Nutrition Recommendations/Plan:   1. Continue regular diet and encourage PO intake   2. RD to add glucerna TID and discontinue magic cups  3. Monitor nutrition adequacy, pertinent labs, bowel habits, wt changes, and clinical progress     Malnutrition Assessment:  Malnutrition Status: Moderate malnutrition (04/14/22 1528)    Context:  Chronic Illness     Findings of the 6 clinical characteristics of malnutrition:  Energy Intake:  7 - 75% or less estimated energy requirements for 1 month or longer  Weight Loss:  7 - 7.5% over 3 months       Nutrition Assessment:    Follow up: Pt improving nutritionally. Pt s/p EGD on 5/11 that showed candida esophagitis and stricture of gastric anastamosis. On regular diet, PO intakes improved to % of meals of today. Intake over past few days has been variable. Wt elevated today. Pt consuming mostly % of glucerna over the past few days, RD to add back TID. Pt reports improvement in nausea and appetite. Pt does not like magic cups, RD to discontinue. Continue to encourage PO intake, will continue to monitor. Nutrition Related Findings:    Nausea improved. -234 x 24 hours. Monitoring w/ insulin. + BM yesterday, per pt. Wound Type: Stage IV,Pressure Injury,Surgical Incision,Burns,Wound Vac       Current Nutrition Intake & Therapies:    Average Meal Intake: %,26-50%  Average Supplements Intake: %,51-75%  ADULT DIET; Regular    Anthropometric Measures:  Height: 6' 0.5\" (184.2 cm)  Ideal Body Weight (IBW): 181 lbs (82 kg)       Current Body Weight: 211 lb 11.2 oz (96 kg),   IBW. Weight Source: Bed Scale  Current BMI (kg/m2): 28.3                          BMI Categories: Overweight (BMI 25.0-29. 9)    Estimated Daily Nutrient Needs:  Energy Requirements Based On: Kcal/kg  Weight Used for Energy Requirements: Ideal (80.9 kg)  Energy (kcal/day): 4664-9870  Weight Used for Protein Requirements: Ideal  Protein (g/day): 80-97  Method Used for Fluid Requirements: 1 ml/kcal    Nutrition Diagnosis:   · Moderate malnutrition related to inadequate protein-energy intake,pain as evidenced by Criteria as identified in malnutrition assessment    Nutrition Interventions:   Food and/or Nutrient Delivery: Continue Current Diet,Start Oral Nutrition Supplement  Nutrition Education/Counseling: Education declined  Coordination of Nutrition Care: Continue to monitor while inpatient,Interdisciplinary Rounds  Plan of Care discussed with: Patient    Goals:  Previous Goal Met: Progressing toward Goal(s)  Goals: Meet at least 75% of estimated needs,prior to discharge       Nutrition Monitoring and Evaluation:   Behavioral-Environmental Outcomes: None Identified  Food/Nutrient Intake Outcomes: Food and Nutrient Intake,Supplement Intake  Physical Signs/Symptoms Outcomes: Skin,Weight,GI Status,Nausea or Vomiting,Diarrhea,Biochemical Data    Discharge Planning:    Continue current diet,Continue Oral Nutrition Supplement     Norleen Dubin, [place], [address] [place] [address],   Contact: Office: [phone]; [address]: [id number]

## 2022-05-12 NOTE — PLAN OF CARE
Problem: Pain:  Goal: Pain level will decrease  Description: Pain level will decrease  5/12/2022 1031 by Lucia Wilson RN  Outcome: Progressing     Problem: Falls - Risk of:  Goal: Will remain free from falls  Description: Will remain free from falls  5/12/2022 1031 by Lucia Wilson RN  Outcome: Progressing     Problem: Skin Integrity:  Goal: Absence of new skin breakdown  Description: Absence of new skin breakdown  5/12/2022 1031 by Lucia Wilson RN  Outcome: Progressing

## 2022-05-12 NOTE — PLAN OF CARE
Problem: Pain:  Goal: Control of acute pain  Description: Control of acute pain  Outcome: Progressing     Problem: Pain:  Goal: Pain level will decrease  Description: Pain level will decrease  Outcome: Progressing     Problem: Pain:  Goal: Control of chronic pain  Description: Control of chronic pain  Outcome: Progressing     Problem: Falls - Risk of:  Goal: Will remain free from falls  Description: Will remain free from falls  Outcome: Progressing     Problem: Skin Integrity:  Goal: Will show no infection signs and symptoms  Description: Will show no infection signs and symptoms  Outcome: Progressing     Problem: Skin Integrity:  Goal: Absence of new skin breakdown  Description: Absence of new skin breakdown  Outcome: Progressing

## 2022-05-12 NOTE — PROGRESS NOTES
Physical Therapy  Facility/Department: [facility]  Rehabilitation Physical Therapy Treatment Note    NAME: Jono Stephens  : 1975 (42 y.o.)  MRN: [id number]  CODE STATUS: Full Code    Date of Service: 22       Restrictions:  Restrictions/Precautions: General Precautions; Fall Risk  Position Activity Restriction  Other position/activity restrictions: RUE PICC, sacral wound vac, rea     SUBJECTIVE  Subjective  Subjective: pt in w/c, agreeable to therapy  Pain: pt reporting 7/10 pain in low back and hips           OBJECTIVE  Cognition  Overall Cognitive Status: Exceptions  Arousal/Alertness: Delayed responses to stimuli  Following Commands: Follows one step commands with repetition; Follows multistep commands with repitition  Attention Span: Attends with cues to redirect  Memory: Decreased recall of precautions;Decreased short term memory  Safety Judgement: Decreased awareness of need for assistance;Decreased awareness of need for safety  Problem Solving: Assistance required to correct errors made;Assistance required to implement solutions;Assistance required to generate solutions;Assistance required to identify errors made  Insights: Decreased awareness of deficits  Initiation: Requires cues for some  Sequencing: Requires cues for some  Cognition Comment: pt with flat affect, participates but appears to be minimally engaged mentally  Orientation  Overall Orientation Status: Within Functional Limits    Vitals: 114/69, 90 bpm, 99%    Education: Pt educated on safe transfers techniques and importance of OOB mobility, sitting up in chair, and progressing standing tolerance - requires reinforcement    Functional Mobility  Scooting  Assistance Level: Maximum assistance; Requires x 2 assistance  Skilled Clinical Factors: maxA x 2 to scoot to [facility]  Standing Balance  Time: Pt stood x30 sec, x1 min, and x15 sec in herb stedy for pericare and changing briefs with CGA  Sit to Stand  Assistance Level: Dependent;Maximum assistance; Requires x 2 assistance; Moderate assistance (mod-max A x2 with RW and herb stedy)  Stand to Sit  Assistance Level: Moderate assistance;Maximum assistance;Dependent; Requires x 2 assistance (mod-max A x2 with RW and herb stedy)  Bed To/From Chair  Assistance Level: Dependent (herb stedy w/c>bed at end of session)  Stand Pivot  Assistance Level: Minimal assistance;Dependent; Requires x 2 assistance (min A x2 SPT toward R and L with RW w/c<>EOM)  Sit Pivot  Assistance Level: Dependent; Requires x 2 assistance;Contact guard assist;Minimal assistance (min A x1 + CGA x1 for sit pivots toward R and L w/c<>drop arm BSC over toilet with cues for sequencing and hand placement)  Car Transfer  Assistance Level: Maximum assistance; Requires x 2 assistance;Minimal assistance  Skilled Clinical Factors: min A x2 for sliding across slideboard toward R and L, max A for lifting BLE in/out of car and assist to place slideboard with pt able to initiate lean with cues. Environmental Mobility  Ambulation  Surface: Level surface  Device: Rolling walker  Distance: 3 ft, 2 ft  Activity Comments: balance beam placed between feet to widen CRYSTAL and prevent scissor gait  Assistance Level: Dependent; Requires x 2 assistance;Contact guard assist (CGA x2 for safety, dependent d/t close w/c follow d/t early fatigue)  Gait Deviations: Slow emili;Decreased step length bilateral;Decreased step length right;Decreased step length left;Decreased heel strike right;Decreased heel strike left;Narrow base of support   Pt limited d/t fatigue and decreased endurance. Pt does not demo incidence of knee buckling     W/C mobility: Pt propelled manual w/c 50 ft with SBA with BUE                  ASSESSMENT/PROGRESS TOWARDS GOALS  Assessment  Assessment: Patient seen as co-treat with OT to facilitate safe transfers and gait training and d/t low activity tolerance.   Patient completed stand pivot transfers with RW and min A x2 with cues for sequencing, sit pivot transfers to/from toilet with min A x1 + CGA x1 for safety, ambulates up to 3 ft with RW and CGA x2 (dependent d/t w/c follow), and completes car transfer with min Ax2 for sliding portion with cues and assistance for BLE alignment and max A to place board and lift BLE in/out of car. Pt requires cues for safety and sequencing throughout mobility. Pt continues to require prolonged rest breaks. Activity Tolerance: Patient limited by fatigue;Patient limited by endurance; Patient limited by pain  Discharge Recommendations: Continue to assess pending progress;Subacute/Skilled Nursing Facility  PT Equipment Recommendations  Equipment Needed: No  Walker: Rolling  Wheelchair: Wheelchair Cushion Pressure Relieving  Other: defer to SNF; if d/c to home from this setting, will require hospital bed, slideboard for car transfers, RW for standing to don pants, 22\" width w/c with swing away armrests and legrests, presesure relieving cushion, anti-tippers, and a ramp     Addendum Second Session  Assessment: Pt seen for w/c mobility training, transfers, and LE Strengthening. Pt in bed at beginning and end of session. Notified MD of increased flat affect and lower engagement in activity recently. Vitals: 98/64 at start of session in w/c, 99/66 at end of session in sitting in w/c. Pt reporting minor dizziness during session. Bed mobility: supine>sit with min A for trunk/safety. Pt completes sit>supine with mod A for BLE and repositioning. Transfers: Pt completes sit pivot toward R bed>w/c with min A and toward L w/c>bed with mod A and increased cues for sequencing and safety. W/C mobility: Pt propels manual w/c 40 ft + 50 ft over outdoor surfaces with BUE and slow emili, managing turns and straight paths with SBA-supervision.     Exercises:  -1x 15 BLE LAQ  -1x 15 BLE seated marches      Goals  Patient Goals   Patient goals : \"to walk again\"  Short Term Goals  Time Frame for Short term goals: 10 days- 4/22/22  Short term goal 1: Pt will complete bed mobility with min A; goal partially met 4/22 - pt completes supine>sit with min A and up to mod A x2 for sit>supine  Short term goal 2: Pt will complete functional transfers with max A x 1 using LRAD; 5/07: GOAL MET, CGA x 2 to CGA + Luisa sit pivot w/c to/from EOM  Short term goal 3: Pt will propel manual w/c 50ft with min A; goal met 4/21 - pt propels manual w/c 75 ft with min A  Short term goal 4: Pt will tolerate gait assessment and appropriate LTG to be set -- 5/07: GOAL MET 3' in // bars Luisa/modA x 2  Long Term Goals  Time Frame for Long term goals : 3 weeks- 5/4/22 - goals extended to 5/16 to reflect updated d/c plan  Long term goal 1: Pt will complete bed mobility with supervision; -- GOAL NOT MET 5/09: S for rolling modA supine to sit  Long term goal 2: Pt will complete functional transfers with min A using LRAD -- GOAL MET 5/09: CGA/Luisa sit pivot t/f, modA x 2 STS with RW  Long term goal 3: Pt will propel manual w/c 150ft with mod I -- GOAL NOT MET 5/09: pt requires supervision for w/c propulsion >150 ft  Long term goal 4: Pt will ambulate x 10' in // bars with Luisa x 2 --  GOAL NOT MET 5/09: x 5' in // bars TD    PLAN OF CARE/SAFETY  Plan  Plan: 5-7 times per week  Plan weeks: 3 weeks  Current Treatment Recommendations: Strengthening;Balance training;Functional mobility training;Transfer training; Endurance training;Gait training;Neuromuscular re-education;Home exercise program;Safety education & training;Patient/Caregiver education & training; Wheelchair mobility training;Equipment evaluation, education, & procurement;Positioning;Manual Therapy - Soft Tissue Mobilization; Therapeutic activities  Safety Devices  Type of Devices: All annemarie prominences offloaded; All fall risk precautions in place; Bed alarm in place;Call light within reach; Patient at risk for falls; Left in bed;Nurse notified;Gait belt  Restraints  Restraints Initially in Place: No      Therapy Time Individual Concurrent Group Co-treatment   Time In 1100     0900   Time Out 1130     1000   Minutes 30     60     Timed Code Treatment Minutes: [address], PT, 05/12/22 at 12:16 PM

## 2022-05-13 NOTE — PROGRESS NOTES
Occupational Therapy  Facility/Department: [facility] ARU  Daily Treatment Note  NAME: David Peoples  : 1975  MRN: [id number]    Date of Service: 2022    Discharge Recommendations:  Subacute/Skilled Nursing Facility  OT Equipment Recommendations  Equipment Needed: No  Other: defer to next level of care      Patient Diagnosis(es): The primary encounter diagnosis was Other chronic pain. A diagnosis of Anxiety was also pertinent to this visit. Assessment    Assessment: Pt pt agreeable to cotx session with encouragement. Pt seen with PT/OT for progression of goals with safety d/t need for Ax2 for functional mobility and higher level balance. Pt completed bed mobility with SBA and use of herb stedy for OOB to w/c. Pt req 2 attempts and Max A x2 to stand from EOB to stedy. Pt ambulated 2x 10 ft with Mod-Max A x2 and req extended rest breaks afterwards d/t SOB. Pt vitals stable throughout session. Pt completed sitting on edge of mat and forward posterior and lateral leans with therapy ball for core and postural engagement with CGA for sitting balance in all directions. Pt Max A x2 for sit-pivot t/fs. Pt limited by motivation and affect. Pt completed seated therex for 1:1 OT session with fair tolerance and need for extended rest breaks after each set. Cont per POC and recommend d/c to SNF d/t limited physical assist at home and pts assist current needs    Activity Tolerance: Patient limited by fatigue;Patient limited by endurance; Patient limited by pain  Discharge Recommendations: Subacute/Skilled Nursing Facility  Factors Affecting Discharge: Currently pt is requiring increased assistance for ADLs and transfers. Pt currently has not had family assist with family training. Wife works and is not able to assist during day. Pt would require 24 hour assistance, use of AE, and heavy assistance with ADLs and mobility. Pt would benefit from continued therapy before return home.   Equipment Needed: No  Other: defer to next level of care      Plan   Plan  Times per Week: 5-7x per week  Times per Day: Daily  Current Treatment Recommendations: Strengthening;ROM;Balance training;Functional mobility training; Endurance training;Pain management; Safety education & training;Patient/Caregiver education & training;Positioning;Self-Care / ADL;Neuromuscular re-education; Wheelchair mobility training;Equipment evaluation, education, & procurement     Restrictions  Restrictions/Precautions  Restrictions/Precautions: General Precautions,Fall Risk  Position Activity Restriction  Other position/activity restrictions: RUE PICC, sacral wound vac, rea    Subjective   Subjective  Subjective: Pt in bed on arrival, agreeable to session with encouragement  Pain: 8/10 pain in back-- RN aware and gave pt medication  Pain: Pt reporting 7/10 pain in low back and hips  Cognition  Overall Cognitive Status: Exceptions  Arousal/Alertness: Delayed responses to stimuli  Following Commands: Follows one step commands with repetition; Follows multistep commands with repitition  Attention Span: Attends with cues to redirect  Memory: Decreased recall of precautions;Decreased short term memory  Safety Judgement: Decreased awareness of need for assistance;Decreased awareness of need for safety  Problem Solving: Assistance required to correct errors made;Assistance required to implement solutions;Assistance required to generate solutions;Assistance required to identify errors made  Insights: Decreased awareness of deficits  Initiation: Requires cues for some  Sequencing: Requires cues for some  Cognition Comment: pt with flat affect, participates but appears to be minimally engaged mentally        Objective    Vitals  Vitals  Pulse: 97  BP: 113/68  BP Location: Left upper arm  Patient Position: Sitting  MAP (Calculated): 83  SpO2: 97 %  Bed Mobility Training  Bed Mobility Training: Yes  Overall Level of Assistance: Moderate assistance  Interventions: Verbal cues; Safety awareness training  Supine to Sit: Stand-by assistance  Sit to Supine: Moderate assistance;Assist X2     ADL  LE Dressing: Maximum assistance  LE Dressing Skilled Clinical Factors: Assist needed to thread RLE and assist over hips  Toileting: Dependent/Total (rea)  OT Exercises  A/AROM Exercises: x20 elbow flexion, wrist extension, shoulder abduction,  and flexion, and supination/pronation and chest presswith 2# weight, req extended rest breaks     Safety Devices  Type of Devices: All annemarie prominences offloaded; All fall risk precautions in place; Bed alarm in place;Call light within reach; Patient at risk for falls;Nurse notified; Left in chair     Patient Education  Education Given To: Patient  Education Provided: Role of Therapy;Plan of Care;Energy Conservation;Transfer Training;Precautions  Education Provided Comments: Pt educated on OT POC, transfer training, energy conservation and importance of OOB mobility. Education Method: Verbal  Barriers to Learning: None  Education Outcome: Verbalized understanding    Goals  Short Term Goals  Time Frame for Short term goals: Pt will complete UB dressing with min A- GOAL MET; Pt SBA for UB dressing at EOB  Short Term Goal 1: Pt will complete LB dressing with mod A-NOT MET, max A x1 5/9  Short Term Goal 2: Pt will complete LB bathing with mod A-GOAL MET 4/26  Short Term Goal 3: Pt will complete UB bathing with SBA-GOAL MET 4/26  Short Term Goal 4: Pt will complete 3 grooming task at w/c level-GOAL MET 4/26  Short Term Goal 5: Pt will complete x20 reps of BUE HEP to improve UB strength/endurance for repositioning and UB ADLs- GOAL MET 4/20;  Pt completing x20 BUE AROM  Additional Goals?: No  Long Term Goals  Time Frame for Long term goals : 3 weeks (5/05)  Long Term Goal 1: Pt will complete total body dressing with supv-GOAL PARTIALLY MET 5/5, setup/SPV UE, mod-maxA LE  Long Term Goal 2: Pt will complete total body bathing with supv-GOAL PARTIALLY MET 5/5, setup/SPV UE, mod-maxA LE  Long Term Goal 3: Pt will complete 3 grooming tasks at sink with mod I-GOAL MET 5/12/22 Pt completed 3 grooming tasks at sink with mod I.  Long Term Goal 4: Pt will perform functional transfers with mod I-DOWNGRADE TO MODA X1 due to inconsistent progress.  PARTIALLY MET_ mod-max Ax1 5/9  Additional Goals?: No  Patient Goals   Patient goals : 1 week (4/21)-EXTEND TO 4/27       Therapy Time   Individual Concurrent Group Co-treatment   Time In 1330     1230   Time Out 1400     1330   Minutes 30     60   Timed Code Treatment Minutes: 90 Minutes       Perfect, OT

## 2022-05-13 NOTE — PROGRESS NOTES
Hospitalist Progress Note      PCP: Caty Neely MD    Date of Admission: 4/13/2022    Chief Complaint: Encephalopathy    Subjective: Tired today. Sleeping this afternoon but wakes easily. Notes some back pain.     Medications:  Reviewed    Infusion Medications    sodium chloride      sodium chloride Stopped (05/11/22 0849)    dextrose       Scheduled Medications    gabapentin  100 mg Oral TID    sodium chloride flush  5-40 mL IntraVENous 2 times per day    fluconazole  100 mg Oral Daily    sucralfate  1 g Oral 4 times per day    pantoprazole  40 mg Oral BID AC    tiZANidine  4 mg Oral TID    polyethylene glycol  17 g Oral Daily    NIFEdipine  30 mg Oral BID    metoprolol succinate  50 mg Oral BID    magnesium oxide  400 mg Oral BID    QUEtiapine  100 mg Oral Nightly    QUEtiapine  25 mg Oral BID    mineral oil-hydrophilic petrolatum   Topical Daily    insulin lispro  0-18 Units SubCUTAneous TID WC    insulin lispro  0-9 Units SubCUTAneous Nightly    insulin glargine  25 Units SubCUTAneous Nightly    metoclopramide  10 mg Oral 4x Daily AC & HS    fluticasone  2 spray Each Nostril Nightly    heparin (porcine)  5,000 Units SubCUTAneous 3 times per day    PARoxetine  20 mg Oral Daily    multivitamin  1 tablet Oral Daily    tamsulosin  0.4 mg Oral Nightly     PRN Meds: sodium chloride flush, sodium chloride, promethazine, senna, haloperidol, QUEtiapine, oxyCODONE, oxyCODONE, prochlorperazine, ondansetron, ondansetron, hydrALAZINE, acetaminophen, diclofenac sodium, glucose, glucagon (rDNA), dextrose, bisacodyl, aluminum & magnesium hydroxide-simethicone, dextrose bolus **OR** dextrose bolus      Intake/Output Summary (Last 24 hours) at 5/12/2022 2138  Last data filed at 5/12/2022 1757  Gross per 24 hour   Intake 810 ml   Output 750 ml   Net 60 ml       Physical Exam Performed:    BP 92/62   Pulse 90   Temp 97.3 °F (36.3 °C) (Oral)   Resp 16   Ht 6' 0.5\" (1.842 m)   Wt 230 lb (104.3 kg)   SpO2 99%   BMI 30.76 kg/m²     General appearance: No apparent distress, appears stated age and cooperative. HEENT: Pupils equal, round, and reactive to light. Conjunctivae/corneas clear. Neck: Supple, with full range of motion. No jugular venous distention. Trachea midline. Respiratory:  Normal respiratory effort. Clear to auscultation, bilaterally without Rales/Wheezes/Rhonchi. Cardiovascular: Regular rate and rhythm with normal S1/S2 without murmurs, rubs or gallops. Abdomen: Soft, non-tender, non-distended with normal bowel sounds. Musculoskeletal: No clubbing, cyanosis or edema bilaterally. Full range of motion without deformity other than bilateral toe amputations. Skin: Skin color, texture, turgor normal.  No rashes or lesions other than to L hand. Neurologic:  Neurovascularly intact without any focal sensory/motor deficits. Cranial nerves: II-XII intact, grossly non-focal.  Psychiatric: Alert and oriented, thought content appropriate, normal insight  Capillary Refill: Brisk,< 3 seconds   Peripheral Pulses: +2 palpable, equal bilaterally       Labs:   Recent Labs     05/12/22  0650   WBC 7.6   HGB 9.6*   HCT 27.2*        Recent Labs     05/12/22  0650      K 4.0      CO2 24   BUN 12   CREATININE 1.1   CALCIUM 9.5     No results for input(s): AST, ALT, BILIDIR, BILITOT, ALKPHOS in the last 72 hours. No results for input(s): INR in the last 72 hours. No results for input(s): Enedina Solo in the last 72 hours.     Urinalysis:      Lab Results   Component Value Date    NITRU Negative 04/27/2022    WBCUA 21-50 04/27/2022    BACTERIA Rare 01/22/2022    RBCUA 3-4 04/27/2022    BLOODU TRACE-INTACT 04/27/2022    SPECGRAV 1.010 04/27/2022    GLUCOSEU Negative 04/27/2022       Consults:    IP CONSULT TO CASE MANAGEMENT  IP CONSULT TO DIETITIAN  IP CONSULT TO SOCIAL WORK  IP CONSULT TO NEPHROLOGY  IP CONSULT TO INFECTIOUS DISEASES  IP CONSULT TO PSYCHIATRY  IP CONSULT TO HOSPITALIST  IP CONSULT TO GI      Assessment/Plan:    Active Hospital Problems    Diagnosis     Moderate malnutrition ([facility] [address] [E44.0]     Critical 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 No overt signs of infection- infection ruled out per ID. CT head non acute. Held neurontin, zanaflex, desyrel. Psych recs appreciated. Started on seroquel with improvement- continue med. Mentation improved to baseline. Resolved       RODRICK: likely prerenal. Improved with IVF. Cr normalized. Nephro signed off.     Multiple wounds: continue wound care.     HTN - w/out known CAD and no evidence of active signs/sxs of ischemia/failure. Currently controlled on home meds w/ vitals reviewed and documented as above.      PAF - now in SR, no AC d/t bleeding risk     MSSA bacteremia -  with left foot abscess, left hand abscess, osteomyelitis, RUL abscess. Completed ABX      Anemia:  recent GIB s/p embolization of gastroduodenal artery on 2/25 and ex-lap. Monitor hgb and transfuse if <7     N/V :  Improving. Possible focal ileus on left side of abdomen was noted on KUB. Lipase was normal. CT abd/pelvis non acute - no ileus or obstruction noted. GI consulted and appreciated s/p EGD Wed 11 May w/ dilation and Candida esophagitis - started on Diflucan. Continue supportive care with antiemetics PRN, PPI. GI following peripherally.      Constipation:  S/P enema and has been having BM's. Seems resolved. Continue bowel regimen.      DMII: BG fairly controlled. Continue insulin        DVT Prophylaxis: SQ Heparin    Recent Labs     05/12/22  0650        Diet: ADULT DIET; Regular  ADULT ORAL NUTRITION SUPPLEMENT; Breakfast, Lunch, Dinner; Diabetic Oral Supplement  Code Status: Full Code      PT/OT Eval Status: in ARU    Dispo - anticipate no medical obstacles to discharge.      Yari Olmedo MD

## 2022-05-13 NOTE — PLAN OF CARE
Problem: Falls - Risk of:  Goal: Will remain free from falls  Description: Will remain free from falls  Outcome: Progressing

## 2022-05-13 NOTE — PROGRESS NOTES
Dressing Change Due 05/16/22 05/13/22 0929   Output (ml) 50 ml 04/22/22 1704   Wound Assessment Pink;Red;Granulation tissue 05/13/22 0929   Shape oval  05/13/22 0929   Odor None 05/13/22 0929   Number of days: 99       Wound 01/24/22 Foot Left;Dorsal I & D to left dorsal foot by Dr. Daphnie Vasquez, surgical wound (Active)   Number of days: 109       Wound 01/30/22 Sacrum SDTI (Active)   Wound Image   05/13/22 0929   Wound Etiology Pressure Stage 4 05/13/22 0929   Dressing Status Clean;Dry; Intact 05/13/22 0929   Wound Cleansed Cleansed with saline 05/13/22 0929   Dressing/Treatment Negative pressure wound therapy 05/13/22 0929   Offloading for Diabetic Foot Ulcers Offloading ordered 05/13/22 0929   Dressing Change Due 05/16/22 05/13/22 0929   Wound Length (cm) 6.5 cm 05/13/22 0929   Wound Width (cm) 4.5 cm 05/13/22 0929   Wound Depth (cm) 0.1 cm 05/13/22 0929   Wound Surface Area (cm^2) 29.25 cm^2 05/13/22 0929   Change in Wound Size % (l*w) -46.25 05/13/22 0929   Wound Volume (cm^3) 2.925 cm^3 05/13/22 0929   Wound Healing % 78 05/13/22 0929   Distance Tunneling (cm) 0 cm 05/13/22 0929   Tunneling Position ___ O'Clock 0 05/13/22 0929   Undermining Starts ___ O'Clock 0 05/13/22 0929   Undermining Ends___ O'Clock 0 05/13/22 0929   Undermining Maxium Distance (cm) 0 05/13/22 0929   Wound Assessment Pink/red;Pale granulation tissue 05/13/22 0929   Drainage Amount None 05/13/22 0929   Drainage Description Serosanguinous 05/11/22 1341   Odor None 05/13/22 0929   Shanita-wound Assessment Blanchable erythema 05/13/22 0929   Margins Attached edges; Defined edges 05/13/22 0929   Wound Thickness Description not for Pressure Injury Full thickness 05/13/22 0929   Number of days: 103    Sacrum         Wound 04/29/22 Hand Left;Posterior (Active)   Wound Image   05/11/22 1341   Wound Etiology Other 05/13/22 0929   Dressing Status New dressing applied 05/13/22 0929   Wound Cleansed Cleansed with saline 05/13/22 0929   Dressing/Treatment Alginate with Ag;Hydrocolloid 05/13/22 0929   Offloading for Diabetic Foot Ulcers Offloading ordered 05/13/22 0929   Dressing Change Due 05/16/22 05/13/22 0929   Wound Length (cm) 1.5 cm 05/09/22 1234   Wound Width (cm) 1.7 cm 05/09/22 1234   Wound Depth (cm) 0.1 cm 05/09/22 1234   Wound Surface Area (cm^2) 2.55 cm^2 05/09/22 1234   Change in Wound Size % (l*w) 71.67 05/09/22 1234   Wound Volume (cm^3) 0.255 cm^3 05/09/22 1234   Wound Healing % 49 05/09/22 1234   Distance Tunneling (cm) 0 cm 05/06/22 1317   Tunneling Position ___ O'Clock 0 05/06/22 1317   Undermining Starts ___ O'Clock 0 05/06/22 1317   Undermining Ends___ O'Clock 0 05/06/22 1317   Undermining Maxium Distance (cm) 0 05/06/22 1317   Wound Assessment Epithelialization;Slough;Pink/red 05/13/22 0929   Drainage Amount Scant 05/13/22 0929   Drainage Description Serosanguinous 05/13/22 0929   Odor None 05/13/22 0929   Shanita-wound Assessment Blanchable erythema 05/13/22 0929   Margins Attached edges 05/13/22 0929   Wound Thickness Description not for Pressure Injury Partial thickness 05/13/22 0929   Number of days: 13   Left hand        Incision 03/08/22 Foot Anterior; Left (Active)   Wound Image   05/13/22 0929   Dressing Status Clean;Dry; Intact 05/13/22 0929   Dressing Change Due 05/16/22 05/13/22 0929   Incision Cleansed Cleansed with saline 05/13/22 0929   Dressing/Treatment Moist to moist;Gauze dressing/dressing sponge;Tape/Soft cloth adhesive tape 05/13/22 0929   Incision Length (cm) 2 05/13/22 0929   Incision Width (cm) 5 cm 05/13/22 0929   Incision Depth (cm) 0.1 cm 05/13/22 0929   Margins Approximated 05/13/22 0929   Incision Assessment Other (Comment) 05/09/22 1234   Drainage Amount Scant 05/13/22 0929   Drainage Description Serosanguinous 05/13/22 0929   Odor None 05/13/22 0929   Shanita-incision Assessment Blanchable erythema 05/13/22 0929   Number of days: 65     Left foot        Response to treatment:  With complaints of pain.      Pain Assessment:  Severity:  7 / 10  Quality of pain: aching  Wound Pain Timing/Severity: intermittent  Premedicated: Yes  Plan:   Plan of Care: Wound 01/30/22 Sacrum SDTI-Dressing/Treatment: Negative pressure wound therapy  [REMOVED] Wound 02/14/22 Head Left; Lower; Posterior Friction/pressure wound-unstageable. 4/14/22 healed now, will complete LDA per CWOCN-Dressing/Treatment: Open to air  [REMOVED] Wound 01/24/22 Hand Left;Dorsal-Dressing/Treatment: Open to air  Wound 04/29/22 Hand Left;Posterior-Dressing/Treatment: Alginate with Ag,Hydrocolloid    Recommendations;   Current dressing removed.  Cleaned wounds with normal saline.  One black sponge placed in coccyx and tracked up over left hip to abdomen. Left buttocks covered with duoderm dressing.   Connected to 125 mmHg low continuous suction.  Continue NPWT dressing to coccyx wound change 3 times a week. Wound care to continue to follow.  Call Wound care for problems with seal at [phone] or call [phone] and leave message. Thanks    Recommend:   Clean feet and legs with warm soapy water, foamy cleanser or bath wipes daily.  Pat dry.  Apply aquapor ointment to feet and legs daily.   Clean left foot wound with normal saline. Apply moist to moist dressing. Change BID.     Cleanse left posterior hand with normal saline, Alginate AG dressing, dry dressing. Change daily. May leave dressing off to air wound if dressing comes off.          Dr. Mindy Nye, Hospitalitis following patient. Recommended if left hand dressing comes off and is moist to leave open to air for time let dry out. Bedside nurse present for discussion, is aware of recommendation. Specialty Bed Required : Yes [place]  [x] Low Air Loss   [x] Pressure Redistribution  [] Fluid Immersion  [] Bariatric  [] Total Pressure Relief  [] Other:     Current Diet: ADULT DIET;  Regular  ADULT ORAL NUTRITION SUPPLEMENT; Breakfast, Lunch, Dinner; Diabetic Oral Supplement  Dietician consult:  Yes    Discharge Plan:  Placement for patient upon discharge: Unknown at this time. Patient appropriate for Outpatient [address]: Yes    Referrals:  [x]   following  [] [facility]  [] Supplies  [] Other    Patient/Caregiver Teaching:Need to keep lower extremities elevated to promote circulation and protect heels from pressure.   Level of patient/caregiver understanding able to:   [] Indicates understanding       [] Needs reinforcement  [] Unsuccessful      [x] Verbal Understanding  [] Demonstrated understanding       [] No evidence of learning  [] Refused teaching         [] N/A       Electronically signed by Sage Duffy RN, on 5/13/2022 at 9:38 AM English

## 2022-05-13 NOTE — PROGRESS NOTES
Physical Therapy  Facility/Department: [facility]  Rehabilitation Physical Therapy Treatment Note    NAME: Lucia Moe  : 1975 (42 y.o.)  MRN: [id number]  CODE STATUS: Full Code    Date of Service: 22       Restrictions:  Restrictions/Precautions: General Precautions; Fall Risk  Position Activity Restriction  Other position/activity restrictions: RUE PICC, sacral wound vac, rea     SUBJECTIVE  Subjective  Subjective: Pt supine in bed upon arrival and agreeable to PT session with encouragement. States he wants to remain in bed for session d/t pain and fatigue. Continues to demo very flat affect with minimal verbalizations during session  Pain: Pt reporting 7/10 pain in low back and hips          OBJECTIVE  Orientation  Overall Orientation Status: Within Functional Limits    Functional Mobility  Scooting  Assistance Level: Maximum assistance; Requires x 2 assistance  Skilled Clinical Factors: maxA x 2 to scoot to [facility]  Further bed mobility or transfers not assessed this session as pt declining EOB or OOB activity d/t pain and fatigue. PT Exercises  A/AROM Exercises: Supine BLE exercises: quad sets x 15, heelslides x 10, glute sets x 10, hip abd/add x 10, SAQ x 10, hip IR/ER x 15 (pt requires increased time and 2-3 min therapeutic rest break after each exercise d/t pain and fatigue)      ASSESSMENT/PROGRESS TOWARDS GOALS  Vital Signs  Pulse: 96  Heart Rate Source: Monitor  BP: 114/75  BP Location: Left upper arm  MAP (Calculated): 88  SpO2: 97 %  O2 Device: None (Room air)    Assessment  Assessment: Pt seen for PT session this morning focusing on LE supine exercises. Pt declines EOB or OOB activity at this time d/t pain and fatigue; only agreeable to supine exercises this session. Pt requires therapeutic rest break after each exercise d/t weakness, fatigue, and pain; requires max A x 2 to scoot to [facility] at end of session.  Pt's progress remains limited by deconditioning, generalized weakness, pain, fatigue, and decreased engagement in therapy sessions. Will continue to progress functional mobility and activity tolerance as appropriate and as pt tolerates. Discharge Recommendations: Continue to assess pending progress;Subacute/Skilled Nursing Facility -- at this time, pt unsafe to d/c home d/t high level of assist (consistent 2 person assist required) and lack of family engagement thus far for family training  PT Equipment Recommendations  Equipment Needed: No  Other: defer to SNF; if d/c to home from this setting, will require hospital bed, slideboard for car transfers, RW for standing to don pants, 22\" width w/c with swing away armrests and legrests, presesure relieving cushion, anti-tippers, and a ramp      Addendum: 2nd Session  Pt supine in bed upon arrival and agreeable to PT/OT cotx session with encouragement. Pt reports 7/10 pain in back and hips; RN notified and administers pain medication at start of cotx session. Pt's mother present for majority of session. Pt continues to demo flat affect throughout session with decreased engagement noted in therapy. Pt seen for PT/OT cotx session this afternoon d/t medical complexity and need for skilled 2 therapist assistance, in order to optimize progression of functional mobility due to given pt current weakness and MARIE for STS transfers and standing balance.    Bed mobility:   Supine to sit with SBA with HOB elevated and use of rail; increased time  Transfers:   Sit to/from stand with mod A x 2 using Stedy (pt completes x 2 reps during session)   Sit to/from stand with max A x 2 up to RW (pt completes x 2 reps during session) with increased time and cues for positioning   Sit pivot transfer w/c>mat with min A x 2 (to L), mat>w/c with mod-max A x 2 (to R)   Bed>w/c and w/c>recliner chair dependently using Stedy  Gait:   Pt ambulates 6ft + 5ft with max A x 2 using RW (with balance beam placed between feet in order to facilitate widened CRYSTAL and prevent narrow CRYTSAL or scissoring) plus close w/c follow for safety. Pt demo's decreased emili and B step length, decreased fluidity, decreased B foot clearance, consistent min B knee flexion and B knee instability however no overt knee buckling demo'd. Assist to stabilize and maneuver RW. PT assists with balance and R/L weight shifting, management of RW, and cues pt for sequencing and quad control/knee ext, while OT cues pt for upright positioning and facilitates balance and weight shifting. Distance limited by weakness, fatigue, and pain. Requires ~5 min seated rest break after each bout to recover. Seated core strengthening on edge of mat table:    Pt places BUE on large red thera-ball and leans anteriorly outside CRYSTAL, then returns to midline, with cues for upright positioning and posture. Completes x 10 reps (2 sets) with cues, min A, and encouragement to increase lean outside CRYSTAL. Pt leans posteriorly to touch upper back to red thera-ball, then uses core/abdominals to return to midline and upright. Completes x 10 reps (2 sets) with cues and encouragement, however no physical assist required. Leaning laterally to R onto R forearm, then pushing with RUE and use of core to return to midline, x 10 reps   Leaning laterally to L onto L forearm, then pushing with LUE and use of core to return to midline, x 10 reps  PT and OT obtained recliner chair and cushion for pt's use in order to increase pt's time and frequency OOB. Educated pt extensively on importance of OOB, side effects of immobility, and need for increasing OOB activity. Pt grunts understanding, however will require reinforcement. Pt seated in recliner chair at end of session with OT, with needs in reach.        Goals  Patient Goals   Patient goals : \"to walk again\"  Short Term Goals  Time Frame for Short term goals: 10 days- 4/22/22  Short term goal 1: Pt will complete bed mobility with min A; goal partially met 4/22 - pt completes supine>sit with min A and up to mod A x2 for sit>supine  Short term goal 2: Pt will complete functional transfers with max A x 1 using LRAD; 5/07: GOAL MET, CGA x 2 to CGA + Luisa sit pivot w/c to/from EOM  Short term goal 3: Pt will propel manual w/c 50ft with min A; goal met 4/21 - pt propels manual w/c 75 ft with min A  Short term goal 4: Pt will tolerate gait assessment and appropriate LTG to be set -- 5/07: GOAL MET 3' in // bars Luisa/modA x 2  Long Term Goals  Time Frame for Long term goals : 3 weeks- 5/4/22 - goals extended to 5/16 to reflect updated d/c plan  Long term goal 1: Pt will complete bed mobility with supervision; -- GOAL NOT MET 5/09: S for rolling modA supine to sit  Long term goal 2: Pt will complete functional transfers with min A using LRAD -- GOAL MET 5/09: CGA/Luisa sit pivot t/f, modA x 2 STS with RW  Long term goal 3: Pt will propel manual w/c 150ft with mod I -- GOAL NOT MET 5/09: pt requires supervision for w/c propulsion >150 ft  Long term goal 4: Pt will ambulate x 10' in // bars with uLisa x 2 --  GOAL NOT MET 5/09: x 5' in // bars TD    PLAN OF CARE/SAFETY  Plan  Plan: 5-7 times per week  Plan weeks: 3 weeks  Current Treatment Recommendations: Strengthening;Balance training;Functional mobility training;Transfer training; Endurance training;Gait training;Neuromuscular re-education;Home exercise program;Safety education & training;Patient/Caregiver education & training; Wheelchair mobility training;Equipment evaluation, education, & procurement;Positioning;Manual Therapy - Soft Tissue Mobilization; Therapeutic activities  Safety Devices  Type of Devices: All annemarie prominences offloaded; All fall risk precautions in place; Bed alarm in place;Call light within reach; Patient at risk for falls; Left in bed;Nurse notified      Therapy Time   Individual Concurrent Group Co-treatment   Time In 1000         Time Out 1030         Minutes 30           Timed Code Treatment Minutes: 60 Minutes        Second Session Therapy Time:   Individual Concurrent Group Co-treatment   Time In      1230   Time Out      1330   Minutes      60   Timed Code Treatment Minutes:  60    Total Treatment Minutes:  [facility],  [address], PT, DPT  05/13/22 at 10:41 AM

## 2022-05-13 NOTE — PROGRESS NOTES
Hospitalist Progress Note      PCP: Noa Brumfield MD    Date of Admission: 4/13/2022    Chief Complaint: Encephalopathy    Subjective: Notes slow improvment but better. Weakness remains. Back and hip pain on occasion. Wound Care notes non-healing wound on left hand - will leave open to air. Awaiting discharge recs. Discussed with nursing and wound care.     Medications:  Reviewed    Infusion Medications    sodium chloride      sodium chloride Stopped (05/11/22 0849)    dextrose       Scheduled Medications    gabapentin  100 mg Oral TID    sodium chloride flush  5-40 mL IntraVENous 2 times per day    fluconazole  100 mg Oral Daily    sucralfate  1 g Oral 4 times per day    pantoprazole  40 mg Oral BID AC    tiZANidine  4 mg Oral TID    polyethylene glycol  17 g Oral Daily    NIFEdipine  30 mg Oral BID    metoprolol succinate  50 mg Oral BID    magnesium oxide  400 mg Oral BID    QUEtiapine  100 mg Oral Nightly    QUEtiapine  25 mg Oral BID    mineral oil-hydrophilic petrolatum   Topical Daily    insulin lispro  0-18 Units SubCUTAneous TID WC    insulin lispro  0-9 Units SubCUTAneous Nightly    insulin glargine  25 Units SubCUTAneous Nightly    metoclopramide  10 mg Oral 4x Daily AC & HS    fluticasone  2 spray Each Nostril Nightly    heparin (porcine)  5,000 Units SubCUTAneous 3 times per day    PARoxetine  20 mg Oral Daily    multivitamin  1 tablet Oral Daily    tamsulosin  0.4 mg Oral Nightly     PRN Meds: sodium chloride flush, sodium chloride, promethazine, senna, haloperidol, QUEtiapine, oxyCODONE, oxyCODONE, prochlorperazine, ondansetron, ondansetron, hydrALAZINE, acetaminophen, diclofenac sodium, glucose, glucagon (rDNA), dextrose, bisacodyl, aluminum & magnesium hydroxide-simethicone, dextrose bolus **OR** dextrose bolus      Intake/Output Summary (Last 24 hours) at 5/13/2022 0723  Last data filed at 5/13/2022 9559  Gross per 24 hour   Intake 810 ml   Output 1300 ml Net -490 ml       Physical Exam Performed:    BP (!) 98/58   Pulse 78   Temp 97.9 °F (36.6 °C) (Oral)   Resp 18   Ht 6' 0.5\" (1.842 m)   Wt 230 lb (104.3 kg)   SpO2 98%   BMI 30.76 kg/m²     General appearance: No apparent distress, appears stated age and cooperative. HEENT: Pupils equal, round, and reactive to light. Conjunctivae/corneas clear. Neck: Supple, with full range of motion. No jugular venous distention. Trachea midline. Respiratory:  Normal respiratory effort. Clear to auscultation, bilaterally without Rales/Wheezes/Rhonchi. Cardiovascular: Regular rate and rhythm with normal S1/S2 without murmurs, rubs or gallops. Abdomen: Soft, non-tender, non-distended with normal bowel sounds. Musculoskeletal: Generalized weakness. Skin: Skin color, texture, turgor normal.  No rashes or lesions other than to L hand. Neurologic:  Neurovascularly intact without any focal sensory/motor deficits. Cranial nerves: II-XII intact, grossly non-focal.  Psychiatric: Alert and oriented, thought content appropriate, normal insight  Capillary Refill: Brisk,< 3 seconds   Peripheral Pulses: +2 palpable, equal bilaterally       Labs:   Recent Labs     05/12/22  0650   WBC 7.6   HGB 9.6*   HCT 27.2*        Recent Labs     05/12/22  0650      K 4.0      CO2 24   BUN 12   CREATININE 1.1   CALCIUM 9.5     No results for input(s): AST, ALT, BILIDIR, BILITOT, ALKPHOS in the last 72 hours. No results for input(s): INR in the last 72 hours. No results for input(s): Shellia Bugler in the last 72 hours.     Urinalysis:      Lab Results   Component Value Date    NITRU Negative 04/27/2022    WBCUA 21-50 04/27/2022    BACTERIA Rare 01/22/2022    RBCUA 3-4 04/27/2022    BLOODU TRACE-INTACT 04/27/2022    SPECGRAV 1.010 04/27/2022    GLUCOSEU Negative 04/27/2022       Consults:    IP CONSULT TO CASE MANAGEMENT  IP CONSULT TO DIETITIAN  IP CONSULT TO SOCIAL WORK  IP CONSULT TO NEPHROLOGY  IP CONSULT TO INFECTIOUS DISEASES  IP CONSULT TO PSYCHIATRY  IP CONSULT TO HOSPITALIST  IP CONSULT TO GI      Assessment/Plan:    Active Hospital Problems    Diagnosis     Moderate malnutrition ([facility] [address] [E44.0]     Critical illness myopathy [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 No overt signs of infection- infection ruled out per ID. CT head non acute. Held neurontin, zanaflex, desyrel. Psych recs appreciated. Started on seroquel with improvement- continue med. Mentation improved to baseline. Resolved       RODRICK: likely prerenal. Improved with IVF. Cr normalized. Nephro signed off.     Multiple wounds: continue wound care.     HTN - w/out known CAD and no evidence of active signs/sxs of ischemia/failure. Currently controlled on home meds w/ vitals reviewed and documented as above.      PAF - now in SR, no AC d/t bleeding risk     MSSA bacteremia -  with left foot abscess, left hand abscess, osteomyelitis, RUL abscess. Completed ABX      Anemia:  recent GIB s/p embolization of gastroduodenal artery on 2/25 and ex-lap. Monitor hgb and transfuse if <7     N/V :  Improving. Possible focal ileus on left side of abdomen was noted on KUB. Lipase was normal. CT abd/pelvis non acute - no ileus or obstruction noted. GI consulted and appreciated s/p EGD Wed 11 May w/ dilation and Candida esophagitis - started on Diflucan. Continue supportive care with antiemetics PRN, PPI. GI following peripherally.      Constipation:  S/P enema and has been having BM's. Seems resolved. Continue bowel regimen.      DMII: BG fairly controlled. Continue insulin      Left hand wound - continue wound care. DVT Prophylaxis: SQ Heparin    Recent Labs     05/12/22  0650        Diet: ADULT DIET; Regular  ADULT ORAL NUTRITION SUPPLEMENT; Breakfast, Lunch, Dinner; Diabetic Oral Supplement  Code Status: Full Code      PT/OT Eval Status: in ARU    Dispo - anticipate no medical obstacles to discharge. Will sign off. Please re-consult if needed.      Cassandra Gandara MD

## 2022-05-13 NOTE — PROGRESS NOTES
Nolberto Galan  5/13/2022  [id number]    Chief Complaint: Critical illness myopathy    Subjective:   No acute events overnight. Today Hawk is seen in his room with a family member present. He denies any current nausea and reports that he has been eating. He denies other acute complaints at this time. Patient is ok for discharge from a medical perspective. ROS: denies f/c, cp, sob, n/v    Objective:  Patient Vitals for the past 24 hrs:   BP Temp Temp src Pulse Resp SpO2   05/13/22 1034 114/75 -- -- 96 -- 97 %   05/13/22 0903 -- -- -- -- 16 --   05/13/22 0825 107/69 97.5 °F (36.4 °C) Oral 93 16 97 %   05/12/22 2145 (!) 98/58 97.9 °F (36.6 °C) Oral 78 18 98 %     Gen: NAD, supine in bed  HEENT: Normocephalic, atraumatic  CV: extremities well perfused  Resp: No respiratory distress. Abd: Soft, nondistended  Ext: No edema   Neuro: Alert, oriented, speech fluent without aphasia.  Delayed processing   Psych: dysthymic    Wt Readings from Last 3 Encounters:   05/06/22 230 lb (104.3 kg)   03/10/22 255 lb 1.6 oz (115.7 kg)   10/14/21 282 lb (127.9 kg)       Laboratory data:   Lab Results   Component Value Date    WBC 7.6 05/12/2022    HGB 9.6 (L) 05/12/2022    HCT 27.2 (L) 05/12/2022    MCV 92.5 05/12/2022     05/12/2022       Lab Results   Component Value Date     05/12/2022    K 4.0 05/12/2022     05/12/2022    CO2 24 05/12/2022    BUN 12 05/12/2022    CREATININE 1.1 05/12/2022    GLUCOSE 140 05/12/2022    CALCIUM 9.5 05/12/2022        Therapy progress:  PT  Position Activity Restriction  Other position/activity restrictions: RUE PICC, sacral wound vac, rea  Objective     Sit to Stand: Maximum Assistance,Dependent/Total,2 Person Assistance  Stand to sit: Maximum Assistance,Dependent/Total,2 Person Assistance  Bed to Chair: Dependent/Total     OT  PT Equipment Recommendations  Equipment Needed: No  Mobility Devices: Walker,Hospital Bed,Wheelchair (slideboard, ramp)  Walker: Rolling  Wheelchair: Wheelchair Guerline 27 Bed : Electric - Full (Head of Bed),Bed Rails - Quadrant  Other: defer to SNF; if d/c to home from this setting, will require hospital bed, slideboard for car transfers, RW for standing to don pants, 22\" width w/c with swing away armrests and legrests, presesure relieving cushion, anti-tippers, and a ramp     Assessment        SLP  Current Diet : Regular  Current Liquid Diet : Thin  Diet Solids Recommendation: Regular  Liquid Consistency Recommendation: Thin    Body mass index is 30.76 kg/m². Assessment and Plan:  Sander Barone is a 42year old male with a past medical history significant for DM2, diabetic foot ulcer with multiple amputations, HFrEF who has had a prolonged hospital course following Covid pneumonia with complications including respiratory failure, GIB, and multiple infections. He was admitted to [facility] on 4/13/22 due to functional deficits below his baseline.      Critical Illness Myopathy  - due to covid pneumonia  - PT, OT, ST     MSSA bactermia  - with left foot abscess, left hand abscess, osteomyelitis, RUL abscess  - completed course of doxycycline 4/28     Multiple wounds POA  - wound vac to sacral wound and left foot wound  - wound care    Encephalopathy, resolved  - etiology unclear. Workup not revealing of infectious, metabolic or intracranial etiology. - wbc, ammonia, electrolytes, lactic acid, and ABG within normal limits  - CT head negative for acute process  - ID following, low suspicion for infection  - possibly polypharmacy? Although patient had been stable on pain medications when hallucinations and encephalopathy developed. - seroquel per Psych, weaning as able    Nausea and vomiting, improved  - chronic problem, but has been intermittently interfering with therapies  - suspect recent worsening due to stricture and esophageal candidiasis  - XR abd showing possible ileus.  CT AP negative for ileus  - Medicine and GI following, appreciate assistance. - continue PRN nausea medications    Esophageal Candidiasis  - seen on EGD 5/11  - diflucan  - PPI BID for 8 weeks  - carafate 1 g 4 weeks  - continue reglan    Gastric Anastomosis Stricture  - dilated on EGD 5/11     HFrEF  - EF 35%  - discontinued lasix due to RODRICK, patient remains euvolemic on exam  - daily weights     CKD  - RODRICK during acute stay due to sepsis, cardiac arrest, requiring dialysis  - recent RODRICK resolved with discontinuing lasix and giving fluids  - Nephrology following, appreciate assistance    Hyperkalemia, resolved  - suspect due to kidney dysfunction  - s/p lokemia 4/26  - Nephro following    Paroxysmal Atrial Fibrillation  - BB  - AC contraindicated due to bleeding risk    HTN  - decreased lopressor 25 mg BID with hold parameters  - decreased nifedipine to 30 mg daily with hold parameters     DM2 complicated by neuropathy  - home regimen lantus 75, prandial 15, SSI  - lantus 25 plus SSI     Diabetic Neuropathy  - gabapentin discontinued while patient was having AMS.  AMS resolved,   - resumed gabapentin 100 mg TID and monitor for side effects     Acute blood loss anemia  - recent GIB s/p embolization of gastroduodenal artery on 2/25 and ex-lap  - monitor and transfuse for Hb<7     History of GIB  - PPI      Urinary Retention  - flomax  - ISC with high volumes, rea replaced  - consider voiding trial once more mobile  - will need follow up with Urology     Chronic Pain  - discontinued dilaudid due to AMS  - oxycodone PRN, wean as able  - scheduled muscle relaxer   - started gabapentin     Bowel: Per protocol  Bladder: Per protocol  Sleep: Trazodone discontinued due to AMS  DVT PPx: heparin    Services/DME: home PT, OT, ST, nursing  EDOD: 5/16/22     Follow up appointments: PCP, Cardiology, wound care, Urology    Electronically signed by Mary Anne Christensen MD on 5/13/2022 at 2:39 PM

## 2022-05-14 NOTE — PROGRESS NOTES
Leonidas Din  5/14/2022  [id number]    Chief Complaint: Critical illness myopathy    Subjective:   No acute events overnight. Today Hawk is seen in his room, resting in bed. He reports feeling fatigued from therapies this week. He denies any nausea and reports that he has been eating. We discuss increasing his gabapentin and weaning his seroquel. He denies other acute complaints at this time. Patient is ok for discharge from a medical perspective. ROS: denies f/c, cp, sob, n/v    Objective:  Patient Vitals for the past 24 hrs:   BP Temp Temp src Pulse Resp SpO2   05/14/22 1120 -- -- -- -- 18 --   05/14/22 0900 119/67 98 °F (36.7 °C) Oral 97 18 97 %   05/13/22 2045 114/74 98 °F (36.7 °C) Oral 82 16 97 %   05/13/22 1724 -- -- -- -- 16 --   05/13/22 1528 113/68 -- -- 97 -- 97 %   05/13/22 1315 -- -- -- -- 16 --     Gen: NAD, supine in bed, pleasant  HEENT: Normocephalic, atraumatic  CV: extremities well perfused  Resp: No respiratory distress. Abd: Soft, nondistended  Ext: No edema   Neuro: Alert, oriented, speech fluent without aphasia.  Delayed processing   Psych: dysthymic    Wt Readings from Last 3 Encounters:   05/06/22 230 lb (104.3 kg)   03/10/22 255 lb 1.6 oz (115.7 kg)   10/14/21 282 lb (127.9 kg)       Laboratory data:   Lab Results   Component Value Date    WBC 7.6 05/12/2022    HGB 9.6 (L) 05/12/2022    HCT 27.2 (L) 05/12/2022    MCV 92.5 05/12/2022     05/12/2022       Lab Results   Component Value Date     05/12/2022    K 4.0 05/12/2022     05/12/2022    CO2 24 05/12/2022    BUN 12 05/12/2022    CREATININE 1.1 05/12/2022    GLUCOSE 140 05/12/2022    CALCIUM 9.5 05/12/2022        Therapy progress:  PT  Position Activity Restriction  Other position/activity restrictions: RUE PICC, sacral wound vac, rea  Objective     Sit to Stand: Maximum Assistance,Dependent/Total,2 Person Assistance  Stand to sit: Maximum Assistance,Dependent/Total,2 Person Assistance  Bed to Chair: Dependent/Total     OT  PT Equipment Recommendations  Equipment Needed: No  Mobility Devices: Walker,Hospital Bed,Wheelchair (slideboard, ramp)  Walker: Rolling  Wheelchair: [address] Bed : Electric - Full (Head of Bed),Bed Rails - Quadrant  Other: defer to SNF; if d/c to home from this setting, will require hospital bed, slideboard for car transfers, RW for standing to don pants, 22\" width w/c with swing away armrests and legrests, presesure relieving cushion, anti-tippers, and a ramp     Assessment        SLP  Current Diet : Regular  Current Liquid Diet : Thin  Diet Solids Recommendation: Regular  Liquid Consistency Recommendation: Thin    Body mass index is 30.76 kg/m². Assessment and Plan:  Ned Luevano is a 42year old male with a past medical history significant for DM2, diabetic foot ulcer with multiple amputations, HFrEF who has had a prolonged hospital course following Covid pneumonia with complications including respiratory failure, GIB, and multiple infections. He was admitted to [facility] on 4/13/22 due to functional deficits below his baseline.      Critical Illness Myopathy  - due to covid pneumonia  - PT, OT, ST     MSSA bactermia  - with left foot abscess, left hand abscess, osteomyelitis, RUL abscess  - completed course of doxycycline 4/28     Multiple wounds POA  - wound vac to sacral wound and left foot wound  - wound care    Encephalopathy, resolved  - etiology unclear. Workup not revealing of infectious, metabolic or intracranial etiology. - wbc, ammonia, electrolytes, lactic acid, and ABG within normal limits  - CT head negative for acute process  - ID following, low suspicion for infection  - possibly polypharmacy? Although patient had been stable on pain medications when hallucinations and encephalopathy developed.   - seroquel per Psych, weaning as able    Nausea and vomiting, improved  - chronic problem, but has been intermittently interfering with therapies  - suspect recent worsening due to stricture and esophageal candidiasis  - XR abd showing possible ileus. CT AP negative for ileus  - Medicine and GI following, appreciate assistance. - continue PRN nausea medications    Esophageal Candidiasis  - seen on EGD 5/11  - diflucan  - PPI BID for 8 weeks  - carafate 1 g 4 weeks  - continue reglan    Gastric Anastomosis Stricture  - dilated on EGD 5/11     HFrEF  - EF 35%  - discontinued lasix due to RODRICK, patient remains euvolemic on exam  - daily weights     CKD  - RODRICK during acute stay due to sepsis, cardiac arrest, requiring dialysis  - recent RODRICK resolved with discontinuing lasix and giving fluids  - Nephrology following, appreciate assistance    Hyperkalemia, resolved  - suspect due to kidney dysfunction  - s/p lokemia 4/26  - Nephro following    Paroxysmal Atrial Fibrillation  - BB  - AC contraindicated due to bleeding risk    HTN  - decreased lopressor 25 mg BID with hold parameters  - decreased nifedipine to 30 mg daily with hold parameters     DM2 complicated by neuropathy  - home regimen lantus 75, prandial 15, SSI  - lantus 25 plus SSI     Diabetic Neuropathy  - gabapentin discontinued while patient was having AMS.  AMS resolved,   - increase gabapentin to 200 mg TID and monitor for side effects     Acute blood loss anemia  - recent GIB s/p embolization of gastroduodenal artery on 2/25 and ex-lap  - monitor and transfuse for Hb<7     History of GIB  - PPI      Urinary Retention  - flomax  - ISC with high volumes, rea replaced  - consider voiding trial once more mobile  - will need follow up with Urology     Chronic Pain  - discontinued dilaudid due to AMS  - oxycodone PRN, wean as able  - scheduled muscle relaxer   - increased gabapentin     Bowel: Per protocol  Bladder: Per protocol  Sleep: Trazodone discontinued due to AMS  DVT PPx: heparin    Services/DME: home PT, OT, ST, nursing  EDOD: 5/16/22     Follow up appointments: PCP, Cardiology, wound [facility], Urology    Electronically signed by Silvia Echeverria MD on 5/14/2022 at 12:01 PM

## 2022-05-14 NOTE — PLAN OF CARE
Problem: Falls - Risk of:  Goal: Will remain free from falls  Description: Will remain free from falls  5/14/2022 0952 by Jason Collazo RN  Outcome: Progressing     Problem: Falls - Risk of:  Goal: Absence of physical injury  Description: Absence of physical injury  Outcome: Progressing

## 2022-05-15 NOTE — PLAN OF CARE
Problem: Falls - Risk of:  Goal: Will remain free from falls  Description: Will remain free from falls  5/14/2022 2222 by Anuradha Stringer RN  Outcome: Progressing  5/14/2022 0952 by Emily Bland RN  Outcome: Progressing

## 2022-05-15 NOTE — PLAN OF CARE
Problem: Falls - Risk of:  Goal: Will remain free from falls  Description: Will remain free from falls  5/15/2022 1115 by Roxi Montiel RN  Outcome: Progressing

## 2022-05-15 NOTE — PROGRESS NOTES
Sam Hickey  5/15/2022  [id number]    Chief Complaint: Critical illness myopathy    Subjective:   No acute events overnight. Today Hawk is seen in his room, resting in bed. He denies acute complaints at this time. Per nursing he has been having diarrhea. Will back off on stool meds. Discussed possibly removing rea today, but apprehensive about removal as patient has been incontinent of stool. ROS: denies f/c, cp, sob, n/v    Objective:  Patient Vitals for the past 24 hrs:   BP Temp Temp src Pulse Resp SpO2 Weight   05/15/22 1011 -- -- -- -- 16 -- --   05/15/22 0930 123/75 97.9 °F (36.6 °C) Oral 90 16 98 % --   05/15/22 0615 -- -- -- -- -- -- 224 lb 11.2 oz (101.9 kg)   05/14/22 2045 111/68 97.9 °F (36.6 °C) Oral 76 17 98 % --   05/14/22 1747 -- -- -- -- 16 -- --     Gen: NAD, supine in bed, pleasant  HEENT: Normocephalic, atraumatic  CV: extremities well perfused  Resp: No respiratory distress. Abd: Soft, nondistended  Ext: No edema   Neuro: Alert, oriented, speech fluent without aphasia.  Delayed processing   Psych: euthymic    Wt Readings from Last 3 Encounters:   05/15/22 224 lb 11.2 oz (101.9 kg)   03/10/22 255 lb 1.6 oz (115.7 kg)   10/14/21 282 lb (127.9 kg)       Laboratory data:   Lab Results   Component Value Date    WBC 7.6 05/12/2022    HGB 9.6 (L) 05/12/2022    HCT 27.2 (L) 05/12/2022    MCV 92.5 05/12/2022     05/12/2022       Lab Results   Component Value Date     05/12/2022    K 4.0 05/12/2022     05/12/2022    CO2 24 05/12/2022    BUN 12 05/12/2022    CREATININE 1.1 05/12/2022    GLUCOSE 140 05/12/2022    CALCIUM 9.5 05/12/2022        Therapy progress:  PT  Position Activity Restriction  Other position/activity restrictions: RUE PICC, sacral wound vac, rea  Objective     Sit to Stand: Maximum Assistance,Dependent/Total,2 Person Assistance  Stand to sit: Maximum Assistance,Dependent/Total,2 Person Assistance  Bed to Chair: Dependent/Total     OT  PT Equipment Recommendations  Equipment Needed: No  Mobility Devices: Walker,Hospital Bed,Wheelchair (slideboard, ramp)  Walker: Rolling  Wheelchair: Wheelchair Guerline 27 Bed : Electric - Full (Head of Bed),Bed [facility]  Other: defer to SNF; if d/c to home from this setting, will require hospital bed, slideboard for car transfers, RW for standing to don pants, 22\" width w/c with swing away armrests and legrests, presesure relieving cushion, anti-tippers, and a ramp     Assessment        SLP  Current Diet : Regular  Current Liquid Diet : Thin  Diet Solids Recommendation: Regular  Liquid Consistency Recommendation: Thin    Body mass index is 30.06 kg/m². Assessment and Plan:  Galina Sotelo is a 42year old male with a past medical history significant for DM2, diabetic foot ulcer with multiple amputations, HFrEF who has had a prolonged hospital course following Covid pneumonia with complications including respiratory failure, GIB, and multiple infections. He was admitted to [facility] on 4/13/22 due to functional deficits below his baseline.      Critical Illness Myopathy  - due to covid pneumonia  - PT, OT, ST     MSSA bactermia  - with left foot abscess, left hand abscess, osteomyelitis, RUL abscess  - completed course of doxycycline 4/28     Multiple wounds POA  - wound vac to sacral wound and left foot wound  - wound care    Encephalopathy, resolved  - etiology unclear. Workup not revealing of infectious, metabolic or intracranial etiology. - wbc, ammonia, electrolytes, lactic acid, and ABG within normal limits  - CT head negative for acute process  - ID following, low suspicion for infection  - possibly polypharmacy? Although patient had been stable on pain medications when hallucinations and encephalopathy developed.   - seroquel per Psych, weaning as able    Nausea and vomiting, improved  - chronic problem, but has been intermittently interfering with therapies  - suspect recent worsening due to stricture and esophageal candidiasis  - XR abd showing possible ileus. CT AP negative for ileus  - Medicine and GI following, appreciate assistance. - continue PRN nausea medications    Esophageal Candidiasis  - seen on EGD 5/11  - diflucan  - PPI BID for 8 weeks  - carafate 1 g 4 weeks  - continue reglan    Gastric Anastomosis Stricture  - dilated on EGD 5/11     HFrEF  - EF 35%  - discontinued lasix due to RODRICK, patient remains euvolemic on exam  - daily weights     CKD  - RODRICK during acute stay due to sepsis, cardiac arrest, requiring dialysis  - recent RODRICK resolved with discontinuing lasix and giving fluids  - Nephrology following, appreciate assistance    Hyperkalemia, resolved  - suspect due to kidney dysfunction  - s/p lokemia 4/26  - Nephro following    Paroxysmal Atrial Fibrillation  - BB  - AC contraindicated due to bleeding risk    HTN  - decreased lopressor 25 mg BID with hold parameters  - decreased nifedipine to 30 mg daily with hold parameters     DM2 complicated by neuropathy  - home regimen lantus 75, prandial 15, SSI  - lantus 25 plus SSI     Diabetic Neuropathy  - gabapentin discontinued while patient was having AMS.  AMS resolved,   - increased gabapentin to 200 mg TID and monitor for side effects     Acute blood loss anemia  - recent GIB s/p embolization of gastroduodenal artery on 2/25 and ex-lap  - monitor and transfuse for Hb<7     History of GIB  - PPI      Urinary Retention  - flomax  - ISC with high volumes, rea replaced  - consider voiding trial once more mobile, possibly tomorrow?  - will need follow up with Urology     Chronic Pain  - discontinued dilaudid due to AMS  - oxycodone PRN, wean as able  - scheduled muscle relaxer   - increased gabapentin     Bowel: Per protocol  Bladder: Per protocol  Sleep: Trazodone discontinued due to AMS  DVT PPx: heparin    Services/DME: home PT, OT, ST, nursing  EDOD: 5/16/22     Follow up appointments: PCP, Cardiology, wound care, Urology    Electronically signed by Trina Milner MD on 5/15/2022 at 12:26 PM

## 2022-05-16 NOTE — CARE COORDINATION
CM spoke with wife and patient bedside with discharge plans. CM discussed that [address] accepted patient for discharge of 05/17. CM discussed transportation with Pablo Calvo and a time of 12:00pm on 05/17.  Michael Barnes RN

## 2022-05-16 NOTE — PROGRESS NOTES
[facility] Nurse  Follow-up Progress Note       NAME:  Ashlee Eason  MEDICAL RECORD NUMBER:  [id number]  AGE:  42 y.o. GENDER:  male  :  1975  TODAY'S DATE:  2022    Subjective: Yes, I need pain medication. Can we load up and go today?  ( [facility])     Wound Identification:  Wound Type: traumatic left foot.  Healing stage 4 pressure injury coccyx/sacrum.  Left hand posterior old burn area open. Contributing Factors:  edema, diabetes, chronic pressure, decreased mobility, shear force, obesity, incontinence of stool and incontinence of urine. Patient Goal of Care:  [x] Wound Healing  [] Odor Control  [] Palliative Care  [x] Pain Control   [] Other:     Objective;  Semi sosa in bed with spouse in room. Lower extremities elevated over pillow. VAC disconnected, dry dressing to sacrum. Shimon Risk Score: Shimon Scale Score: 15  Assessment: sacrum raised red granulation, anterior pale granulation. Scant serosanguinous drainage. Left foot. pink bed color with small scab area. No drainage noted. Left hand crusty edges from drainage, red scant drainage serosanguinous. Measurements:  Negative Pressure Wound Therapy Coccyx (Active)   $ Standard NPWT <=50 sq cm PER TX $ Yes 22 09   $ Standard NPWT >50 sq cm PER TX $ Yes 22 1605   Wound Type Pressure ulcer: Stage IV 22   Unit Type [employer] [id number] 22   Dressing Type Black Foam 22   Number of pieces used 1 22 09   Number of pieces removed 1 22 09   Cycle Continuous 22   Target Pressure (mmHg) 125 22   Intensity 1 22 09   Canister changed?  No 22   Dressing Status Other (Comment) 05/15/22 2045   Dressing Changed Changed/New 22   Drainage Amount Other (Comment) 05/15/22 2045   Drainage Description Serosanguinous 22   Dressing Change Due 22 05/13/22 0929   Output (ml) 50 ml 04/22/22 1704   Wound Assessment Pink;Red;Granulation tissue 05/13/22 0929   Shape oval  05/13/22 0929   Odor None 05/13/22 0929   Number of days: 102       Wound 01/24/22 Foot Left;Dorsal I & D to left dorsal foot by Dr. Dangelo Barbosa, surgical wound (Active)   Number of days: 112       Wound 01/30/22 Sacrum SDTI (Active)   Wound Image   05/16/22 1124   Wound Etiology Pressure Stage 4 05/16/22 1124   Dressing Status New dressing applied 05/16/22 1124   Wound Cleansed Cleansed with saline 05/16/22 1124   Dressing/Treatment Alginate with Ag; Foam 05/16/22 1124   Offloading for Diabetic Foot Ulcers Offloading ordered 05/16/22 1124   Dressing Change Due 05/17/22 05/16/22 1124   Wound Length (cm) 5.8 cm 05/16/22 1124   Wound Width (cm) 4 cm 05/16/22 1124   Wound Depth (cm) 0.2 cm 05/16/22 1124   Wound Surface Area (cm^2) 23.2 cm^2 05/16/22 1124   Change in Wound Size % (l*w) -16 05/16/22 1124   Wound Volume (cm^3) 4.64 cm^3 05/16/22 1124   Wound Healing % 66 05/16/22 1124   Distance Tunneling (cm) 0 cm 05/16/22 1124   Tunneling Position ___ O'Clock 0 05/16/22 1124   Undermining Starts ___ O'Clock 0 05/16/22 1124   Undermining Ends___ O'Clock 0 05/16/22 1124   Undermining Maxium Distance (cm) 0 05/16/22 1124   Wound Assessment Pink/red;Pale granulation tissue 05/16/22 1124   Drainage Amount Scant 05/16/22 1124   Drainage Description Sanguinous 05/16/22 1124   Odor None 05/16/22 1124   Shanita-wound Assessment Blanchable erythema 05/16/22 1124   Margins Attached edges; Defined edges 05/16/22 1124   Wound Thickness Description not for Pressure Injury Full thickness 05/16/22 1124   Number of days: 106   Sacrum          Wound 04/29/22 Hand Left;Posterior (Active)   Wound Image   05/16/22 1151   Wound Etiology Other 05/16/22 1151   Dressing Status New dressing applied 05/16/22 1151   Wound Cleansed Cleansed with saline 05/16/22 1151   Dressing/Treatment Hydrocolloid 05/16/22 1151   Offloading for Diabetic Foot Ulcers Offloading ordered 05/13/22 0929   Dressing Change Due 05/16/22 05/13/22 0929   Wound Length (cm) 1.5 cm 05/09/22 1234   Wound Width (cm) 1.7 cm 05/09/22 1234   Wound Depth (cm) 0.1 cm 05/09/22 1234   Wound Surface Area (cm^2) 2.55 cm^2 05/09/22 1234   Change in Wound Size % (l*w) 71.67 05/09/22 1234   Wound Volume (cm^3) 0.255 cm^3 05/09/22 1234   Wound Healing % 49 05/09/22 1234   Distance Tunneling (cm) 0 cm 05/06/22 1317   Tunneling Position ___ O'Clock 0 05/06/22 1317   Undermining Starts ___ O'Clock 0 05/06/22 1317   Undermining Ends___ O'Clock 0 05/06/22 1317   Undermining Maxium Distance (cm) 0 05/06/22 1317   Wound Assessment Epithelialization 05/16/22 1151   Drainage Amount Scant 05/16/22 1151   Drainage Description Serous 05/16/22 1151   Odor None 05/16/22 1151   Shanita-wound Assessment Blanchable erythema 05/16/22 1151   Margins Attached edges 05/16/22 1151   Wound Thickness Description not for Pressure Injury Partial thickness 05/16/22 1151   Number of days: 17     Left hand        Incision 03/08/22 Foot Anterior; Left (Active)   Wound Image   05/16/22 1124   Dressing Status New dressing applied; Old drainage noted 05/16/22 1124   Dressing Change Due 05/17/22 05/16/22 1124   Incision Cleansed Cleansed with saline 05/16/22 1124   Dressing/Treatment Gauze dressing/dressing sponge;Roll gauze 05/16/22 1124   Incision Length (cm) 2 05/13/22 0929   Incision Width (cm) 5 cm 05/13/22 0929   Incision Depth (cm) 0.1 cm 05/13/22 0929   Margins Approximated 05/16/22 1124   Incision Assessment Dry;Erythema 05/16/22 1124   Drainage Amount None 05/16/22 1124   Drainage Description Serosanguinous 05/15/22 0614   Odor None 05/16/22 1124   Shanita-incision Assessment Blanchable erythema 05/16/22 1124   Number of days: 68   Left foot      Response to treatment:  With complaints of pain.      Pain Assessment:  Severity:  7 / 10  Quality of pain: sharp, aching, burning  Wound Pain Timing/Severity: intermittent  Premedicated: Yes    Dr. Mary Alice Loredo approved  prn pain medication to be administered. Plan:   Plan of Care: Wound 01/30/22 Sacrum SDTI-Dressing/Treatment: Alginate with Ag,Foam  [REMOVED] Wound 02/14/22 Head Left; Lower; Posterior Friction/pressure wound-unstageable. 4/14/22 healed now, will complete LDA per CWOCN-Dressing/Treatment: Open to air  [REMOVED] Wound 01/24/22 Hand Left;Dorsal-Dressing/Treatment: Open to air  Wound 04/29/22 Hand Left;Posterior-Dressing/Treatment: Hydrocolloid    Recommendation;  Dressing change daily; Cleanse left foot, & sacrum, with normal saline, pat dry. Left foot apply dry guaze with roll guaze covering daily. Sacrum apply Alginate AG with foam dressing to cover daily. Left hand posterior hand, cleanse with normal saline, pat dry, apply hydrocolloid over old burn area of 5th knuckle and pink patricia wound. Change every 3 days or as needed if dislodges. Dr. Mary Alice Loredo observed wound with Wound Care, agreed with dressing change. Specialty Bed Required : Yes  [place]  [x] Low Air Loss   [x] Pressure Redistribution  [] Fluid Immersion  [] Bariatric  [] Total Pressure Relief  [] Other:     Current Diet: ADULT DIET; Regular  ADULT ORAL NUTRITION SUPPLEMENT; Breakfast, Lunch, Dinner; Diabetic Oral Supplement  Dietician consult:  Yes    Discharge Plan:  Placement for patient upon discharge: skilled nursing   Patient appropriate for Outpatient [address]: Yes    Referrals:  [x]    following  [] [address] [facility]  [] Supplies  [] Other    Patient/Caregiver Teaching: Reviewed need to reposition to relieve pressure off sacrum for healing.    Level of patient/caregiver understanding able to:   [] Indicates understanding       [] Needs reinforcement  [] Unsuccessful      [x] Verbal Understanding  [] Demonstrated understanding       [] No evidence of learning  [] Refused teaching         [] N/A       Electronically signed by Nabil Mejias RN, on 5/16/2022 at 11:56 AM

## 2022-05-16 NOTE — PROGRESS NOTES
D- Order for rea catheter discontinued. A- Explained procedure to patient. Deflated balloon. Removed catheter from patient. Preformed patricia care. Instructed pt the importance of drinking water. Urinal placed at bedside. Call light within reach. R- Patient verbalized understanding. Will continue to monitor.

## 2022-05-16 NOTE — PROGRESS NOTES
Physical Therapy  Facility/Department: [facility]  Rehabilitation Physical Therapy Treatment Note    NAME: Manasa aMrtin  : 1975 (42 y.o.)  MRN: [id number]  CODE STATUS: Full Code    Date of Service: 22       Restrictions:  Restrictions/Precautions: General Precautions; Fall Risk  Position Activity Restriction  Other position/activity restrictions: RUE PICC, sacral wound vac, rea     SUBJECTIVE  Subjective  Subjective: Pt lying in bed upon arrival. Agreeable to PT session and requests to sit EOB to eat his breakfast.  Pain: Pt reports 7/10 pain in B hips and low back               OBJECTIVE  Orientation  Overall Orientation Status: Within Functional Limits    Functional Mobility  Roll Left  Assistance Level: Supervision  Skilled Clinical Factors: With BR  Roll Right  Assistance Level: Supervision  Skilled Clinical Factors: With BR  Supine to Sit  Assistance Level: Minimal assistance  Skilled Clinical Factors: Luisa at trunk, HOB elevated, use of BR    Balance  Sitting Balance: Independent                PT Exercises  Dynamic Sitting Balance Exercises: Pt seated EOB with no UE support x10 minutes finishing breakfast. Completed independently. ASSESSMENT/PROGRESS TOWARDS GOALS  Vital Signs  Pulse: 86  BP: 126/83  BP Location: Right upper arm  MAP (Calculated): 97.33  SpO2: 99 %  O2 Device: None (Room air)    Assessment  Assessment: Pt seen supine in bed this am and is agreeable to PT session with encouragement. Pt requests to get seated EOB to eat his breakfast. Pt reports 7/10 low back pain, however this does not limit his participation. Pt continues to be limited by his pain, fatigue, decreased strength and activity tolerance. Pt requires up to Luisa for bed mobility, requiring Luisa for trunk management, HOB elevated, and use of BR. Pt demo's improvement with sitting balance as he was able to sit EOB independently without UE support.  Pt will continue to benefit from skilled PT in [facility] to address deficits. Functional mobility will be progressed as tolerated. Activity Tolerance: Patient limited by fatigue;Patient limited by endurance; Patient limited by pain  Discharge Recommendations: Subacute/Skilled Nursing Facility  PT Equipment Recommendations  Walker: Rolling  Wheelchair: Wheelchair Cushion Pressure Relieving  Other: defer to SNF; if d/c to home from this setting, will require hospital bed, slideboard for car transfers, RW for standing to don pants, 22\" width w/c with swing away armrests and legrests, presesure relieving cushion, anti-tippers, and a ramp      Addendum: 2nd session:  Pt seen in am for second PT session. Pt seen as co-tx with OT secondary to complexity of condition, decreased activity tolerance, strength, balance and high MARIE with mobility. Pt benefits from x 2 skilled hands to provide increased cues and feedback with mobility. Pt reports 8/10 pain in back with RN aware and providing medication during session. Pt is initially agreeable to session however MD present to assess pt during session and discuss d/c plans and pt getting agitated regarding d/c planning. Pt reports \"I may as well just get back to bed, I'm not doing anything today. I'll do it when I get to the new facility\". Pt continues to be agitated difficult to re-direct. With max encouragement pt is agreeable to minimal therapeutic activities. Pt requires variable assist for mobility, grossly Luisa for bed mobility, Luisa to modA x 2 for t/f, modA/maxA x 2 ambulation 10' with RW. Unsafe to attempt stairs or further bouts of gait. Pt provided with HEP and instructed in completion of exercises.     Bed mobility:  Supine to sit SBA with HOB elevated, use of BR, increased time to complete  Sit to supine Luisa for LE, HOB flat without BR  Scooting to HOB modA x 2  Transfers:  STS EOB to RW modA + CGA  SPT EOB to w/c with RW Luisa/modA x 2  STS w/c to RW modA x 2, x 2 reps  Car t/f (w/c with pivot to car with RW) grossly modA x 2  Ambulation:  Surface: Level tile  Device: RW, w/c follow  Distance: 10'  MARIE: modA/maxA x 2 with close w/c follow for safety  Gait deviations: Partial step through pattern, B decreased toe clearance with foot drop and toes IC, B decreased hip extension in stance, B decreased hip/knee flexion in swing, increased UE support. Pt unsteady especially with turn to sit to surface with poor fluidity and motor control and scissoring of feet. Pt requires modA x 2 initially but progresses to maxA x 2 with turn to sit at EOB. Seated rest break to recover from fatigue. W/c mobility:  Surface: Level tile  Propulsion: BUE  Distance: 150' x 2  MARIE: MI  Increased time to complete, short strokes, intermittent rest d/t fatigue. Pt supine in bed at end of session with OT to complete remaining session.     Goals  Patient Goals   Patient goals : \"to walk again\"  Short Term Goals  Time Frame for Short term goals: 10 days- 4/22/22  Short term goal 1: Pt will complete bed mobility with min A; goal partially met 4/22 - pt completes supine>sit with min A and up to mod A x2 for sit>supine  Short term goal 2: Pt will complete functional transfers with max A x 1 using LRAD; 5/07: GOAL MET, CGA x 2 to CGA + Luisa sit pivot w/c to/from EOM  Short term goal 3: Pt will propel manual w/c 50ft with min A; goal met 4/21 - pt propels manual w/c 75 ft with min A  Short term goal 4: Pt will tolerate gait assessment and appropriate LTG to be set -- 5/07: GOAL MET 3' in // bars Luisa/modA x 2  Long Term Goals  Time Frame for Long term goals : 3 weeks- 5/4/22 - goals extended to 5/16 to reflect updated d/c plan  Long term goal 1: Pt will complete bed mobility with supervision; -- GOAL NOT MET 5/16: Supervision for rolling, Luisa supine to sit/ sit to supine  Long term goal 2: Pt will complete functional transfers with min A using LRAD -- GOAL MET 5/09: CGA/Luisa sit pivot t/f, modA x 2 STS with RW  Long term goal 3: Pt will propel manual w/c 150ft with mod I -- GOAL NOT MET 5/09: pt requires supervision for w/c propulsion >150 ft  Long term goal 4: Pt will ambulate x 10' in // bars with Luisa x 2 --  GOAL NOT MET 5/09: x 5' in // bars TD    PLAN OF CARE/SAFETY  Plan  Plan: 5-7 times per week  Plan weeks: 3 weeks  Current Treatment Recommendations: Strengthening;Balance training;Functional mobility training;Transfer training; Endurance training;Gait training;Neuromuscular re-education;Home exercise program;Safety education & training;Patient/Caregiver education & training; Wheelchair mobility training;Equipment evaluation, education, & procurement;Positioning;Manual Therapy - Soft Tissue Mobilization; Therapeutic activities  Safety Devices  Type of Devices: All annemarie prominences offloaded; All fall risk precautions in place; Bed alarm in place;Call light within reach; Patient at risk for falls;Nurse notified; Left in chair      Therapy Time   Individual Concurrent Group Co-treatment   Time In 0800         Time Out 0830         Minutes 30           Timed Code Treatment Minutes: 30 Minutes       FILOMENA REID 05/16/22 at 10:15 AM     First session was completed by the student physical therapist with supervision from Kenneth Saravia PT, DPT and documentation was reviewed.     Second Session Therapy Time:   Individual Concurrent Group Co-treatment   Time In      0930   Time Out      1030   Minutes      60     Timed Code Treatment Minutes:  60 minutes    Total Treatment Minutes:  90 minutes    Kenneth Saravia PT, DPT C/NDT

## 2022-05-16 NOTE — CARE COORDINATION
Clinicals faxed to insurance for .   Monico Beasley MPH, RRT  Clinical Liaison Anoop Carrion  (L)[phone]  (B)[phone]   Electronically signed by Monico Beasley on 5/16/2022 at 9:24 AM

## 2022-05-16 NOTE — PROGRESS NOTES
Cheryl Sanchez  5/16/2022  [id number]    Chief Complaint: Critical illness myopathy    Subjective:   No acute events overnight. Today Hawk is seen in his room with case management present. His wife is present bedside. He reports stool incontinence due to loose stools. He reports that he can feel when he needs to have a bowel movement, but does not have enough warning to get to the toilet. He is agreeable to removing rea and started bladder program. Discuss case with patient and his wife. Plan for patient to discharge to skilled facility tomorrow for continued rehabilitation. Patient is making progress, but it is slow. Patient will likely need several more weeks of therapy to be at a level where his wife would not need to provide 24 hour supervision. Also saw patient with wound care today. Coccyx wound with significant improvement. Able to discontinue wound vac. ROS: denies f/c, cp, sob, n/v    Objective:  Patient Vitals for the past 24 hrs:   BP Temp Temp src Pulse Resp SpO2 Weight   05/16/22 1230 -- -- -- -- -- -- 208 lb 14.4 oz (94.8 kg)   05/16/22 1010 -- -- -- -- 16 -- --   05/16/22 0900 -- 98 °F (36.7 °C) Oral -- 16 -- --   05/16/22 0817 126/83 -- -- 86 -- 99 % --   05/15/22 2045 118/75 98.1 °F (36.7 °C) Oral 82 16 98 % --   05/15/22 1636 -- -- -- -- 16 -- --     Gen: NAD, supine in bed, pleasant  HEENT: Normocephalic, atraumatic  CV: extremities well perfused  Resp: No respiratory distress. Abd: Soft, nondistended  Ext: No edema   Neuro: Alert, oriented, speech fluent without aphasia.  Delayed processing   Psych: euthymic  Skin: coccyx wound per picture    Wt Readings from Last 3 Encounters:   05/16/22 208 lb 14.4 oz (94.8 kg)   03/10/22 255 lb 1.6 oz (115.7 kg)   10/14/21 282 lb (127.9 kg)       Laboratory data:   Lab Results   Component Value Date    WBC 7.6 05/12/2022    HGB 9.6 (L) 05/12/2022    HCT 27.2 (L) 05/12/2022    MCV 92.5 05/12/2022     05/12/2022       Lab Results   Component Value Date     05/12/2022    K 4.0 05/12/2022     05/12/2022    CO2 24 05/12/2022    BUN 12 05/12/2022    CREATININE 1.1 05/12/2022    GLUCOSE 140 05/12/2022    CALCIUM 9.5 05/12/2022        Therapy progress:  PT  Position Activity Restriction  Other position/activity restrictions: RUE PICC, no wound vac on this date, álvaro  Objective     Sit to Stand: Maximum Assistance,Dependent/Total,2 Person Assistance  Stand to sit: Maximum Assistance,Dependent/Total,2 Person Assistance  Bed to Chair: Dependent/Total     OT  PT Equipment Recommendations  Equipment Needed: No  Mobility Devices: Walker,Hospital Bed,Wheelchair (slideboard, ramp)  Walker: Rolling  Wheelchair: Wheelchair Guerline 27 Bed : Electric - Full (Head of Bed),Bed Rails - Quadrant  Other: defer to SNF; if d/c to home from this setting, will require hospital bed, slideboard for car transfers, RW for standing to don pants, 22\" width w/c with swing away armrests and legrests, presesure relieving cushion, anti-tippers, and a ramp     Assessment        SLP  Current Diet : Regular  Current Liquid Diet : Thin  Diet Solids Recommendation: Regular  Liquid Consistency Recommendation: Thin    Body mass index is 27.94 kg/m². Assessment and Plan:  Miriam Montez is a 42year old male with a past medical history significant for DM2, diabetic foot ulcer with multiple amputations, HFrEF who has had a prolonged hospital course following Covid pneumonia with complications including respiratory failure, GIB, and multiple infections. He was admitted to [facility] on 4/13/22 due to functional deficits below his baseline.      Critical Illness Myopathy  - due to covid pneumonia  - PT, OT, ST     MSSA bactermia  - with left foot abscess, left hand abscess, osteomyelitis, RUL abscess  - completed course of doxycycline 4/28     Multiple wounds POA  - wound care following  - wounds on foot and coccyx significantly improved.  No longer need for wound vac  - wound care per orders    Encephalopathy, resolved  - etiology unclear. Workup not revealing of infectious, metabolic or intracranial etiology. - wbc, ammonia, electrolytes, lactic acid, and ABG within normal limits  - CT head negative for acute process  - ID following, low suspicion for infection  - possibly polypharmacy? Although patient had been stable on pain medications when hallucinations and encephalopathy developed. - discontinue seroquel    Nausea and vomiting, improved  - chronic problem, but has been intermittently interfering with therapies  - suspect recent worsening due to stricture and esophageal candidiasis  - XR abd showing possible ileus. CT AP negative for ileus  - Medicine and GI following, appreciate assistance. - continue PRN nausea medications    Esophageal Candidiasis  - seen on EGD 5/11  - diflucan  - PPI BID for 8 weeks  - carafate 1 g 4 weeks  - continue reglan    Gastric Anastomosis Stricture  - dilated on EGD 5/11     HFrEF  - EF 35%  - discontinued lasix due to RODRICK, patient remains euvolemic on exam  - daily weights     CKD  - RODRICK during acute stay due to sepsis, cardiac arrest, requiring dialysis  - recent RODRICK resolved with discontinuing lasix and giving fluids  - Nephrology following, appreciate assistance    Hyperkalemia, resolved  - suspect due to kidney dysfunction  - s/p lokemia 4/26  - Nephro following    Paroxysmal Atrial Fibrillation  - BB  - AC contraindicated due to bleeding risk    HTN  - decreased lopressor 25 mg BID with hold parameters  - decreased nifedipine to 30 mg daily with hold parameters     DM2 complicated by neuropathy  - home regimen lantus 75, prandial 15, SSI  - lantus 25 plus SSI     Diabetic Neuropathy  - gabapentin discontinued while patient was having AMS.  AMS resolved,   - increased gabapentin to 200 mg TID and monitor for side effects     Acute blood loss anemia  - recent GIB s/p embolization of gastroduodenal artery on 2/25 and ex-lap  - monitor and transfuse for Hb<7     History of GIB  - PPI      Urinary Retention  - flomax  - ISC with high volumes, rea replaced  - consider voiding trial once more mobile, possibly tomorrow?  - will need follow up with Urology     Chronic Pain  - discontinued dilaudid due to AMS  - oxycodone PRN, wean as able  - scheduled muscle relaxer   - increased gabapentin     Bowel: Per protocol  Bladder: Per protocol  Sleep: Trazodone discontinued due to AMS  DVT PPx: heparin    Services/DME: home PT, OT, ST, nursing  EDOD: 5/16/22     Follow up appointments: PCP, Cardiology, wound care, Urology    Electronically signed by Darion Sawyer MD on 5/16/2022 at 3:13 PM

## 2022-05-16 NOTE — PROGRESS NOTES
Occupational Therapy  Facility/Department: [facility] ARU  Rehabilitation Occupational Therapy Daily Treatment Note    Date: 22  Patient Name: Chrissy Amor       Room: 5083/5778-81  MRN: [id number]  Account: [de-identified]   : 1975  (41 y.o.) Gender: male                    Past Medical History:  has a past medical history of Abscess of left foot, Abscess of left hand, Acute encephalopathy, Acute hypoxemic respiratory failure ([address] Acute osteomyelitis of left hand ([address] Acute osteomyelitis of right hallux ([address] Cardiopulmonary arrest ([address] Cellulitis of left foot, CHF (congestive heart failure) ([address] Chronic osteomyelitis of left foot ([address] Closed displaced fracture of distal phalanx of right little finger, Clostridium difficile infection, Diabetic ulcer of left forefoot associated with type 2 diabetes mellitus, with necrosis of bone ([address] Diabetic ulcer of right great toe associated with type 2 diabetes mellitus, with necrosis of bone ([address] Enterococcal infection, 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 Past Surgical History:   has a past surgical history that includes Tonsillectomy; [place] tooth extraction; Knee arthroscopy (Left, 08/15/2013); Toe amputation (Right, 2019); other surgical history (Left, 2020); Toe amputation (Left, 2020); Toe amputation (Left, 10/22/2020); Foot Debridement (Left, 2022); Arm Surgery (Left, 2022);  IR NONTUNNELED VASCULAR CATHETER > 5 YEARS (02/11/2022); Upper gastrointestinal endoscopy (N/A, 02/17/2022); IR EMBOLIZATION HEMORRHAGE (Right, 02/25/2022); Upper gastrointestinal endoscopy (N/A, 2/27/2022); laparotomy (N/A, 2/27/2022); Foot Debridement (Left, 3/8/2022); Cholecystectomy; Dilatation, esophagus; Endoscopy, colon, diagnostic; Upper gastrointestinal endoscopy (N/A, 5/11/2022); and Upper gastrointestinal endoscopy (5/11/2022). Restrictions  Restrictions/Precautions: General Precautions; Fall Risk  Other position/activity restrictions: RUE PICC, no wound vac on this date, álvaro    Subjective  Subjective: Pt in bed, reports 8/10 back and buttock pain, RN administered pain medication at start of session, Vitals 123/76. HR 86, O2 sats 99%. Pt discussed discharge disposition with MD at beginning of session and became agitated, refusing therapy. Pt willing to participate with increased time, therapeutic listening, and limited standing tasks. Restrictions/Precautions: General Precautions; Fall Risk             Objective     Cognition  Overall Cognitive Status: Exceptions  Arousal/Alertness: Delayed responses to stimuli  Following Commands: Follows one step commands with repetition; Follows multistep commands with repitition  Attention Span: Attends with cues to redirect  Memory: Decreased recall of precautions;Decreased short term memory  Safety Judgement: Decreased awareness of need for assistance;Decreased awareness of need for safety  Problem Solving: Assistance required to correct errors made;Assistance required to implement solutions;Assistance required to generate solutions;Assistance required to identify errors made  Insights: Decreased awareness of deficits  Initiation: Requires cues for some  Sequencing: Requires cues for some  Orientation  Overall Orientation Status: Within Functional Limits         ADL  Feeding  Assistance Level: Set-up  Skilled Clinical Factors: sitting upright in bed  Grooming/Oral Hygiene  Assistance Level: Modified independent  Skilled Clinical Factors: seated in w/c, completed washing face, combing hair, and brushing teeth  Upper Extremity Bathing  Skilled Clinical Factors: Pt refused bathing this date. Lower Extremity Bathing  Skilled Clinical Factors: Pt refused bathing this date. Upper Extremity Dressing  Assistance Level: Set-up  Skilled Clinical Factors: to don shirt  Lower Extremity Dressing  Skilled Clinical Factors: Pt refused changing clothes this date. Putting On/Taking Off Footwear  Assistance Level: Dependent  Skilled Clinical Factors: to don shoes  Toileting  Assistance Level: Dependent  Skilled Clinical Factors: rea catheter, frequently incontinent of bowel. Toilet Transfers  Skilled Clinical Factors: Pt refused transfer to [facility] or toilet this date. Functional Mobility  Device: Wheelchair  Activity: To/From bathroom  Assistance Level: Supervision  Skilled Clinical Factors: cues to turn and back out of bathroom  Bed Mobility  Overall Assistance Level: Minimal Assistance  Sit to Supine  Assistance Level: Minimal assistance  Supine to Sit  Assistance Level: Supervision  Skilled Clinical Factors: HOB elevated use of bed rail  Scooting  Assistance Level: Maximum assistance  Transfers  Surface: From bed; Wheelchair  Additional Factors: Increased time to complete; Mat raised; With handrails;Verbal cues; Hand placement cues  Device: Walker  Sit to Stand  Assistance Level: Dependent  Skilled Clinical Factors: mod A of 2 to stand to RW  Stand to Sit  Assistance Level: Dependent  Skilled Clinical Factors: mod/max A of 2 to safely sit in w/c or bed after transfers  Bed To/From Chair  Technique: Stand step  Assistance Level: Dependent  Skilled Clinical Factors: mod A of 2 with RW  Stand Pivot  Assistance Level: Dependent  Skilled Clinical Factors: mod A of 2 with RW  Car Transfer  Assistance Level: Dependent  Skilled Clinical Factors: mod A of 2   OT Exercises  A/AROM Exercises: x15 shoulder flexion to 90* and shoulder horizontal abduction/adduction to 75*, 50-75% assist for movement; chest press x15 AROM  Resistive Exercises: x15, 3# elbow flexion, wrist flexion, wrist extension, and supination/pronation, grasp/release of red sponge for 20 reps on BUEs     Assessment  Assessment  Assessment: Pt agreeable to OT/PT session on arrival. Pt completed supine>sit with SPV and mod A of 2 to stand from EOB to RW. Pt completed stand step transfer to [facility] with RW and min/mod A of 2. Pt discussed with MD after transfer into [facility] about discharge home vs SNF. Pt became very agitated and refused therapy for remainder of day. PT/OT provided therapeutic listening and pt agreeable to brushing teeth while seated in [facility]. Pt completed grooming with mod I. Pt completed [facility] mobility to/from gym with mod I/SPV and increased time. Pt completed stand pivot w/c>car with min/mod A of 2 and mod A of 2 for sit>stand from seat. Pt stood from [facility] in room with mod A of 2 and ambulated 10 feet to bed, requiring mod/max A of 2 to safely turn to bed due to fatiguing of LEs and buckling of R knee. Pt refused any further transfers or getting out of bed for remainder of session. Pt required co-treat due to decreased LE strength and poor coordination requiring assist with transfers and ambulation. Pt completed final 30 minutes of OT only session in bed for BUE ther ex. Pt performed 15 reps with 3# weight for wrist and elbow exercises but B shoulder pain limited shoulder movement. Pt completed AAROM for shoulder exercises. Pt verbalized understanding of all exercises and recommendations. Pt refused bathing and dressing this date. Continue POC. Activity Tolerance: Patient limited by fatigue;Patient limited by endurance; Patient limited by pain  Discharge Recommendations: Subacute/Skilled Nursing Facility  Factors Affecting Discharge: Currently pt is requiring increased assistance for ADLs and transfers.  Pt currently has not had family assist with family training. Wife works and is not able to assist during day. Pt would require 24 hour assistance, use of AE, and heavy assistance with ADLs and mobility. Pt would benefit from continued therapy before return home. OT Equipment Recommendations  Equipment Needed: No  Mobility Devices: ADL Assistive Devices  ADL Assistive Devices: Grab Bars - shower;Transfer Tub Bench;Long-handled Sponge; Toileting - Heavy Duty Commode  Other: defer to next level of care  Safety Devices  Safety Devices in place: Yes  Type of devices: Call light within reach; Patient at risk for falls; All fall risk precautions in place;Gait belt;Nurse notified; Left in bed;Bed alarm in place    Patient Education  Education  Education Given To: Patient  Education Provided: ADL Function;Role of Therapy;Plan of Care;Equipment;Home Exercise Program;Precautions; Safety; Energy Conservation; Family Education  Education Provided Comments: improtance of OOB activities, repositioning strategies, skin integrity, Fx transfers with sit/stand pivot, use of a/e, BUE ther ex  Education Method: Demonstration;Verbal  Barriers to Learning: None  Education Outcome: Verbalized understanding;Demonstrated understanding    Plan  Plan  Times per Week: 5-7x per week  Current Treatment Recommendations: Strengthening;ROM;Balance training;Functional mobility training; Endurance training;Pain management; Safety education & training;Patient/Caregiver education & training;Positioning;Self-Care / ADL;Neuromuscular re-education; Wheelchair mobility training;Equipment evaluation, education, & procurement    Goals  Patient Goals   Patient goals : 1 week (4/21)-EXTEND TO 4/27  Short Term Goals  Time Frame for Short term goals: Pt will complete UB dressing with min A- GOAL MET; Pt SBA for UB dressing at EOB  Short Term Goal 1: Pt will complete LB dressing with mod A-NOT MET, max A x1 5/9  Short Term Goal 2: Pt will complete LB bathing with mod A-GOAL MET 4/26  Short Term Goal 3: Pt will complete UB bathing with SBA-GOAL MET 4/26  Short Term Goal 4: Pt will complete 3 grooming task at w/c level-GOAL MET 4/26  Short Term Goal 5: Pt will complete x20 reps of BUE HEP to improve UB strength/endurance for repositioning and UB ADLs- GOAL MET 4/20; Pt completing x20 BUE AROM  Long Term Goals  Time Frame for Long term goals : 3 weeks (5/05)  Long Term Goal 1: Pt will complete total body dressing with supv-GOAL PARTIALLY MET 5/5, setup/SPV UE, mod-maxA LE  Long Term Goal 2: Pt will complete total body bathing with supv-GOAL PARTIALLY MET 5/5, setup/SPV UE, mod-maxA LE  Long Term Goal 3: Pt will complete 3 grooming tasks at sink with mod I-GOAL MET 5/12/22 Pt completed 3 grooming tasks at sink with mod I.  Long Term Goal 4: Pt will perform functional transfers with mod I-DOWNGRADE TO MODA X1 due to inconsistent progress.  PARTIALLY MET_ mod-max Ax1 5/9    Therapy Time   Individual Concurrent Group Co-treatment   Time In 1030     0930   Time Out 1100     1030   Minutes 30     60   Timed Code Treatment Minutes: [place] Wilson

## 2022-05-16 NOTE — PLAN OF CARE
Problem: Falls - Risk of:  Goal: Will remain free from falls  Description: Will remain free from falls  5/15/2022 2226 by Rick Mcpherson RN  Outcome: Progressing  5/15/2022 1115 by Jayleen Gifford RN  Outcome: Progressing

## 2022-05-16 NOTE — PLAN OF CARE
Problem: ABCDS Injury Assessment  Goal: Absence of physical injury  Outcome: Progressing   Safety precautions remain in place. Patient remains free of physical injury.

## 2022-05-17 NOTE — PROGRESS NOTES
Pt transported via [facility] to [facility]. Belongings sent with pt. IV removed. Report called to nurse Landon Keen  at [facility].

## 2022-05-17 NOTE — DISCHARGE SUMMARY
Physical Medicine & Rehabilitation  Discharge Summary     Patient Identification:  Jono Stephens  : 1975  Admit date: 2022  Discharge date:  22  Attending provider: Asuncion Peralta MD        Primary care provider: Reji Aquino MD     Discharge Diagnoses:   Patient Active Problem List   Diagnosis    Hypertension    Uncontrolled type 2 diabetes mellitus with diabetic polyneuropathy ([address]    Cardiomyopathy ([address]    Mixed hyperlipidemia    Allergic rhinitis    Osteoarthritis    Acute osteomyelitis of left foot ([address]    Acute renal failure ([address]    Staph aureus infection    Third degree burn of left hand    Antibiotic-associated diarrhea    Pressure injury of deep tissue of sacral region    Severe protein-calorie malnutrition ([address]    Paroxysmal atrial fibrillation (HCC)    Duodenal ulcer    Diabetic ulcer of left midfoot associated with type 2 diabetes mellitus, with necrosis of bone ([address]    Probable abscess of upper lobe of right lung without pneumonia ([address]    Gastrointestinal hemorrhage    ABLA (acute blood loss anemia)    MSSA bacteremia    Critical illness myopathy    Moderate malnutrition ([address]       History of Present Illness/Acute Hospital Course:  Xavier Duverney is a 42year old male with a past medical history significant for DM2, diabetic 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 At the [facility] he was managed for bilateral lower extremity wounds, MSSA bacteremia, left foot abscess, left hand abscess, osteomyelitis, lung abscess. He was treated with antibiotics and is to continue doxycycline through . He has cardiomyopathy with combined heart failure with EF 35%, managed with diuretics.  Course further complicated by blood loss anemia due to duodenal ulcer s/p gastroduodenal artery embolization on , ex-lap and pyloroplasty on 2/27. He has required multiple blood transfusions. He underwent cholecystectomy on 3/23. He underwent EGD on 4/1 without source of bleeding. Course also notable for atrial fibrillation, unstageable pressure ulcer of his sacrum. He has a wound vac on his sacral wound and left foot wound. He was admitted to [facility] on 4/13/22 due to functional deficits below his baseline. Inpatient Rehabilitation Course:   Lucia Moe is a 42 y.o. male admitted to inpatient rehabilitation on 4/13/2022 with Critical illness myopathy. The patient participated in an aggressive multidisciplinary inpatient rehabilitation program involving 3 hours of therapy per day, at least 5 days per week. He made good progress, but continues to require significant amount of assistance. He will benefit from continued rehabilitation at a skilled level of care. Impairments: impaired mobility and ADLs, impaired gait and balance    Medical Management:    Critical Illness Myopathy  - due to covid pneumonia  - PT, OT, ST     MSSA bactermia  - with left foot abscess, left hand abscess, osteomyelitis, RUL abscess  - completed course of doxycycline 4/28     Multiple wounds POA  - wound care following  - wounds on foot and coccyx significantly improved. No longer need for wound vac  - wound care per orders     Encephalopathy, resolved  - etiology unclear. Workup not revealing of infectious, metabolic or intracranial etiology. - wbc, ammonia, electrolytes, lactic acid, and ABG within normal limits  - CT head negative for acute process  - ID following, low suspicion for infection  - possibly polypharmacy? Although patient had been stable on pain medications when hallucinations and encephalopathy developed.   - wean seroquel prior to discharge to SNF per Psych recs     Nausea and vomiting, improved  - chronic problem, but has been intermittently interfering with therapies  - suspect recent worsening due to stricture and esophageal candidiasis  - XR abd showing possible ileus. CT AP negative for ileus  - Medicine and GI following, appreciate assistance. - continue PRN nausea medications     Esophageal Candidiasis  - seen on EGD 5/11  - diflucan  - PPI BID for 8 weeks  - carafate 1 g 4 weeks  - continue reglan     Gastric Anastomosis Stricture  - dilated on EGD 5/11     HFrEF  - EF 35%  - discontinued lasix due to RODRICK, patient remains euvolemic on exam  - daily weights     CKD  - RODRICK during acute stay due to sepsis, cardiac arrest, requiring dialysis  - recent RODRICK resolved with discontinuing lasix and giving fluids  - Nephrology following, appreciate assistance     Hyperkalemia, resolved  - suspect due to kidney dysfunction  - s/p lokemia 4/26  - Nephro following     Paroxysmal Atrial Fibrillation  - BB  - AC contraindicated due to bleeding risk     HTN  - decreased lopressor 25 mg BID with hold parameters  - decreased nifedipine to 30 mg daily with hold parameters     DM2 complicated by neuropathy  - home regimen lantus 75, prandial 15, SSI  - lantus 25 plus SSI     Diabetic Neuropathy  - gabapentin discontinued while patient was having AMS. AMS resolved,   - increased gabapentin to 200 mg TID and monitor for side effects     Acute blood loss anemia  - recent GIB s/p embolization of gastroduodenal artery on 2/25 and ex-lap  - monitor and transfuse for Hb<7     History of GIB  - PPI      Urinary Retention  - flomax  - ISC with high volumes, rea replaced  - will need follow up with Urology     Chronic Pain  - discontinued dilaudid due to AMS  - oxycodone PRN, wean as able  - scheduled muscle relaxer   - increased gabapentin     Bowel: Per protocol  Bladder: Per protocol  Sleep: Trazodone discontinued due to AMS  DVT PPx: heparin    Discharge Exam:  Constitutional: Alert, WDWN, Pleasant, no distress  Head: Normocephalic, atruamatic, MMM  Eyes: Conjunctiva noninjected, no icterus, no drainage  Pulm: CTA bilat. Respirations non-labored.    CV: No murmurs noted. RRR. Abd: Soft, nontender. NABS+  Ext: No edema, no varicosities  Neuro: Alert, fully oriented, appropriate   MSK: No long bone deformities      Discharge Functional Status:    Physical therapy:    Bed Mobility:     Sit>supine:  Assistance Level: Minimal assistance  Skilled Clinical Factors: HOB elevated, use of BR, requires Luisa for BLE management  Supine>sit:  Assistance Level: Minimal assistance  Skilled Clinical Factors: Luisa at trunk, HOB elevated, use of BR  Transfers:  Surface: From bed,To chair with arms  Additional Factors: Hand placement cues,Increased time to complete,Verbal cues  Device: Lift equipment  Sit>stand:  Assistance Level: Dependent,Maximum assistance,Requires x 2 assistance,Moderate assistance (mod-max A x2 with RW and herb stedy)  Skilled Clinical Factors: w/c to recliner with herb plus TD d/t lift equipment  Stand>sit:  Assistance Level: Moderate assistance,Maximum assistance,Dependent,Requires x 2 assistance (mod-max A x2 with RW and herb stedy)  Bed<>chair  Technique: Sit pivot  Assistance Level: Dependent (herb stedy w/c>bed at end of session)  Skilled Clinical Factors: Sit pivot t/f EOB to w/c grossly CGA to Luisa x 1, cues for sequence, increased time to complete. Cues to increase clearance of buttocks to decrease shear force at wound, intermittent Luisa for anterior WS and increasing hip clearance  Stand Pivot:  Assistance Level: Minimal assistance,Dependent,Requires x 2 assistance (min A x2 SPT toward R and L with RW w/c<>EOM)  Lateral transfer:     Car transfer:  Assistance Level: Maximum assistance,Requires x 2 assistance,Minimal assistance  Skilled Clinical Factors: min A x2 for sliding across slideboard toward R and L, max A for lifting BLE in/out of car and assist to place slideboard with pt able to initiate lean with cues.   Ambulation:  Surface: Level surface  Device: Rolling walker  Distance: 3 ft, 2 ft  Activity Comments: balance beam placed between feet to widen CRYSTAL and prevent scissor gait  Assistance Level: Dependent,Requires x 2 assistance,Contact guard assist (CGA x2 for safety, dependent d/t close w/c follow d/t early fatigue)  Gait Deviations: Slow emili,Decreased step length bilateral,Decreased step length right,Decreased step length left,Decreased heel strike right,Decreased heel strike left,Narrow base of support  Stairs:     Curb: Wheelchair:  Surface: Level surface  Device: Standard wheelchair  Additional Factors: Increased time to complete  Assistance Required to Manage Parts: Left armrest,Right armrest,Brakes (cues without physical assistance)  Assistance Level for Propulsion: Supervision  Propulsion Method: Bilateral upper extremities  Propulsion Quality: Slow velocity,Short strokes,Decreased fluidity  Propulsion Distance: 160'  Skilled Clinical Factors: Completes with multiple turns  Assessment:  Assessment: Pt seen supine in bed this am and is agreeable to PT session with encouragement. Pt requests to get seated EOB to eat his breakfast. Pt reports 7/10 low back pain, however this does not limit his participation. Pt continues to be limited by his pain, fatigue, decreased strength and activity tolerance. Pt requires up to Luisa for bed mobility, requiring Luisa for trunk management, HOB elevated, and use of BR. Pt will continue to benefit from skilled PT in ARU to address deficits. Functional mobility will be progressed as tolerated.   Activity Tolerance: Patient limited by fatigue,Patient limited by endurance,Patient limited by pain  Discharge Recommendations: Subacute/Skilled Nursing Facility      Occupational therapy:     Feeding  Assistance Level: Set-up  Skilled Clinical Factors: sitting upright in bed  Grooming/Oral Hygiene  Assistance Level: Modified independent  Skilled Clinical Factors: seated in w/c, completed washing face, combing hair, and brushing teeth  UE Bathing  Assistance Level: Supervision,Set-up  Skilled Clinical Factors: Pt refused bathing this date. LE Bathing  Assistance Level: Increased time to complete,Minimal assistance  Skilled Clinical Factors: Pt refused bathing this date. UE Dressing  Assistance Level: Set-up  Skilled Clinical Factors: to don shirt  LE Dressing  Equipment Provided: Reachers  Assistance Level: Maximum assistance  Skilled Clinical Factors: Pt refused changing clothes this date. Putting On/Taking Off Footwear  Assistance Level: Dependent  Skilled Clinical Factors: to don shoes  Toileting  Assistance Level: Dependent  Skilled Clinical Factors: rea catheter, frequently incontinent of bowel. Transfers: Toilet Transfers  Technique:  (steady EOB to [facility])  Equipment: Beside commode  Additional Factors: Increased time to complete,Cues for hand placement  Assistance Level: Moderate assistance  Skilled Clinical Factors: Pt refused transfer to [facility] or toilet this date. Tub/Shower Transfers  Type: Tub  Transfer From: Wheelchair  Transfer To: Tub transfer bench  Additional Factors: Set-up,With handrails,Increased time to complete,Cues for hand placement  Assistance Level: Moderate assistance  Skilled Clinical Factors: Pt declined OOB activities and minimal ADLs sitting upright in bed  IADLs:  Meal Prep     Money Management     [place] Management     Assessment:  Assessment: Pt agreeable to OT/PT session on arrival. Pt completed supine>sit with SPV and mod A of 2 to stand from EOB to RW. Pt completed stand step transfer to w/ with RW and min/mod A of 2. Pt discussed with MD after transfer into w/ about discharge home vs SNF. Pt became very agitated and refused therapy for remainder of day. PT/OT provided therapeutic listening and pt agreeable to brushing teeth while seated in w/c. Pt completed grooming with mod I. Pt completed w/c mobility to/from gym with mod I/SPV and increased time.  Pt completed stand pivot w/c>car with min/mod A of 2 and mod A of 2 for sit>stand from seat. Pt stood from w/c in room with mod A of 2 and ambulated 10 feet to bed, requiring mod/max A of 2 to safely turn to bed due to fatiguing of LEs and buckling of R knee. Pt refused any further transfers or getting out of bed for remainder of session. Pt required co-treat due to decreased LE strength and poor coordination requiring assist with transfers and ambulation. Pt completed final 30 minutes of OT only session in bed for BUE ther ex. Pt performed 15 reps with 3# weight for wrist and elbow exercises but B shoulder pain limited shoulder movement. Pt completed AAROM for shoulder exercises. Pt verbalized understanding of all exercises and recommendations. Pt refused bathing and dressing this date. Continue POC. Activity Tolerance: Patient limited by fatigue,Patient limited by endurance,Patient limited by pain  Discharge Recommendations: Subacute/Skilled Nursing Facility  Factors Affecting Discharge: Currently pt is requiring increased assistance for ADLs and transfers. Pt currently has not had family assist with family training. Wife works and is not able to assist during day. Pt would require 24 hour assistance, use of AE, and heavy assistance with ADLs and mobility. Pt would benefit from continued therapy before return home. Speech therapy:      Speech Therapy Diagnosis  Cognitive Diagnosis: WFL  Dysphagia Diagnosis: Swallow function appears grossly intact          Physical therapy:    Bed Mobility: Scooting:  Moderate assistance  Transfers: Sit to Stand: Maximum Assistance,Dependent/Total,2 Person Assistance  Stand to sit: Maximum Assistance,Dependent/Total,2 Person Assistance  Bed to Chair: Dependent/Total,  ,    Mobility:  , PT Equipment Recommendations  Equipment Needed: No  Mobility Devices: Walker,Hospital Bed,Wheelchair (slideboard, ramp)  Walker: Rolling  Wheelchair: One [facility] Bed : Electric - Full (Head of Bed),Bed [facility] Quadrant  Other: defer to SNF; if d/c to home from this setting, will require hospital bed, slideboard for car transfers, RW for standing to don pants, 22\" width w/c with swing away armrests and legrests, presesure relieving cushion, anti-tippers, and a ramp, Assessment: Patient seen for LE exercises to progress LE strengthening. Patient completed rolling each direction with mod A to replace michel pad and found to be incontinent of BM. Pt's soiled brief changed. Pt scooted toward HOB with max A x2. Pt then completed supine exercises. Occupational therapy:      ,  , Assessment: First session: Pt incontinent of BMs this date requiring max assist for toileting while supine in bed. Pt able to tolerate transfer to standard toilet with max x2 and use of Liyah Aures. Pt endorsed s/s of dizziness during STS resulting in return to bed after toileting. Second session: Completed UB sponge bath at EOB with pt requiring CGA/SBA for sitting balance and UB dressing. Scapular mobilization completed on L shoulder for upward rotation and elevation d/t pt reporting \"tightness\" when completing ADLs. Pt reporting relief after mobilization. Engaged pt in BUE exercises at bed level. Pt left supine in bed with all immediate needs met. Cont POC.     Speech therapy:             Significant Diagnostics:   Lab Results   Component Value Date    CREATININE 1.1 05/17/2022    BUN 19 05/17/2022     (L) 05/17/2022    K 4.5 05/17/2022    CL 99 05/17/2022    CO2 23 05/17/2022       Lab Results   Component Value Date    WBC 11.0 05/17/2022    HGB 10.3 (L) 05/17/2022    HCT 29.0 (L) 05/17/2022    MCV 90.8 05/17/2022     05/17/2022       Disposition:  SNF    Services/DME: defer to next level of care    Discharge Condition: Stable    Follow-up:  See after visit summary from hospitalization    Discharge Medications:     Medication List      START taking these medications    aluminum & magnesium hydroxide-simethicone 200-200-20 MG/5ML Susp suspension  Commonly known as: MAALOX  Take 30 mLs by mouth every 6 hours as needed for Indigestion     bisacodyl 10 MG suppository  Commonly known as: DULCOLAX  Place 1 suppository rectally daily as needed for Constipation     diclofenac sodium 1 % Gel  Commonly known as: VOLTAREN  Apply 4 g topically 4 times daily as needed for Pain     fluconazole 100 MG tablet  Commonly known as: DIFLUCAN  Take 1 tablet by mouth daily for 15 doses     fluticasone 50 MCG/ACT nasal spray  Commonly known as: FLONASE  2 sprays by Each Nostril route at bedtime     heparin (porcine) 5000 UNIT/ML injection  Inject 1 mL into the skin every 8 hours     loperamide 2 MG capsule  Commonly known as: IMODIUM  Take 1 capsule by mouth 4 times daily as needed for Diarrhea     metoclopramide 10 MG tablet  Commonly known as: REGLAN  Take 1 tablet by mouth 4 times daily (before meals and nightly)     metoprolol succinate 25 MG extended release tablet  Commonly known as: TOPROL XL  Take 1 tablet by mouth 2 times daily     mineral oil-hydrophilic petrolatum ointment  Apply topically as needed. NIFEdipine 30 MG extended release tablet  Commonly known as: PROCARDIA XL  Take 1 tablet by mouth daily     ondansetron 4 MG disintegrating tablet  Commonly known as: ZOFRAN-ODT  Take 1 tablet by mouth every 8 hours as needed for Nausea or Vomiting     oxyCODONE 5 MG immediate release tablet  Commonly known as: ROXICODONE  Take 1 tablet by mouth every 4 hours as needed for Pain for up to 3 days.      polyethylene glycol 17 g packet  Commonly known as: GLYCOLAX  Take 17 g by mouth daily as needed for Constipation     prochlorperazine 5 MG tablet  Commonly known as: COMPAZINE  Take 1 tablet by mouth every 6 hours as needed for Nausea     senna 8.6 MG tablet  Commonly known as: SENOKOT  Take 2 tablets by mouth nightly as needed (constipation)     tamsulosin 0.4 MG capsule  Commonly known as: FLOMAX  Take 1 capsule by mouth at bedtime     therapeutic multivitamin-minerals tablet  Take 1 tablet by mouth daily        CHANGE how you take these medications    Lantus SoloStar 100 UNIT/ML injection pen  Generic drug: insulin glargine  Inject 25 Units into the skin nightly  What changed: how much to take     PARoxetine 10 MG tablet  Commonly known as: PAXIL  Take 2 tablets by mouth every morning  What changed: how much to take     tiZANidine 4 MG tablet  Commonly known as: ZANAFLEX  Take 1 tablet by mouth in the morning, at noon, and at bedtime  What changed:   · how much to take  · when to take this        CONTINUE taking these medications    * blood glucose test strips  Use to test blood sugar daily. DX:E11.9     * OneTouch Ultra strip  Generic drug: blood glucose test strips  USE TO TEST BLOOD GLUCOSE DAILY. DX:E11.9     Contour Next EZ w/Device Kit  Use to test glucose daily. DX:E11.9     gabapentin 100 MG capsule  Commonly known as: NEURONTIN  Take 2 capsules by mouth 3 times daily for 30 days. insulin lispro 100 UNIT/ML injection vial  Commonly known as: HUMALOG  Inject 0-18 Units into the skin 4 times daily (before meals and nightly)     Insulin Pen Needle 32G X 6 MM Misc  Use with insulin daily . DX:E11.9     pantoprazole 40 MG tablet  Commonly known as: PROTONIX  Take 1 tablet by mouth 2 times daily (before meals)     sucralfate 1 GM tablet  Commonly known as: CARAFATE  Take 1 tablet by mouth 4 times daily         * This list has 2 medication(s) that are the same as other medications prescribed for you. Read the directions carefully, and ask your doctor or other care provider to review them with you.             STOP taking these medications    carvedilol 12.5 MG tablet  Commonly known as: COREG     Claritin 10 MG tablet  Generic drug: loratadine     dilTIAZem 120 MG extended release capsule  Commonly known as: Cardizem CD     rosuvastatin 20 MG tablet  Commonly known as: CRESTOR     traZODone 50 MG tablet  Commonly known as: DESYREL        ASK your doctor about these medications    promethazine 12.5 MG tablet  Commonly known as: PHENERGAN  Take 1 tablet by mouth every 6 hours as needed for Nausea  Ask about: Should I take this medication? Where to Get Your Medications      You can get these medications from any pharmacy    Bring a paper prescription for each of these medications  · oxyCODONE 5 MG immediate release tablet     Information about where to get these medications is not yet available    Ask your nurse or doctor about these medications  · aluminum & magnesium hydroxide-simethicone 200-200-20 MG/5ML Susp suspension  · bisacodyl 10 MG suppository  · diclofenac sodium 1 % Gel  · fluconazole 100 MG tablet  · fluticasone 50 MCG/ACT nasal spray  · gabapentin 100 MG capsule  · heparin (porcine) 5000 UNIT/ML injection  · Lantus SoloStar 100 UNIT/ML injection pen  · loperamide 2 MG capsule  · metoclopramide 10 MG tablet  · metoprolol succinate 25 MG extended release tablet  · mineral oil-hydrophilic petrolatum ointment  · NIFEdipine 30 MG extended release tablet  · ondansetron 4 MG disintegrating tablet  · PARoxetine 10 MG tablet  · polyethylene glycol 17 g packet  · prochlorperazine 5 MG tablet  · promethazine 12.5 MG tablet  · senna 8.6 MG tablet  · sucralfate 1 GM tablet  · tamsulosin 0.4 MG capsule  · therapeutic multivitamin-minerals tablet  · tiZANidine 4 MG tablet           I spent over 35 minutes on this discharge encounter between counseling, coordination of care, and medication reconciliation. To comply with [address] [facility] bylaw [id number]:   Discharge order placed in advance to facilitate patients discharge needs.       Jana Loja MD

## 2022-05-17 NOTE — PROGRESS NOTES
PROGRESS NOTE  S:46 yrs Patient  admitted on 4/13/2022 with Critical illness myopathy [G72.81] . Patient states feeling better. Current Hospital Schedued Meds   gabapentin  200 mg Oral TID    NIFEdipine  30 mg Oral Daily    metoprolol succinate  25 mg Oral BID    sodium chloride flush  5-40 mL IntraVENous 2 times per day    fluconazole  100 mg Oral Daily    sucralfate  1 g Oral 4 times per day    pantoprazole  40 mg Oral BID AC    tiZANidine  4 mg Oral TID    mineral oil-hydrophilic petrolatum   Topical Daily    insulin lispro  0-18 Units SubCUTAneous TID WC    insulin lispro  0-9 Units SubCUTAneous Nightly    insulin glargine  25 Units SubCUTAneous Nightly    metoclopramide  10 mg Oral 4x Daily AC & HS    fluticasone  2 spray Each Nostril Nightly    heparin (porcine)  5,000 Units SubCUTAneous 3 times per day    PARoxetine  20 mg Oral Daily    multivitamin  1 tablet Oral Daily    tamsulosin  0.4 mg Oral Nightly     Current Hospital IV Meds   sodium chloride      dextrose       Current Hospital PRN Meds  oxyCODONE, polyethylene glycol, loperamide, sodium chloride flush, sodium chloride, promethazine, senna, haloperidol, QUEtiapine, prochlorperazine, ondansetron, ondansetron, hydrALAZINE, acetaminophen, diclofenac sodium, glucose, glucagon (rDNA), dextrose, bisacodyl, aluminum & magnesium hydroxide-simethicone, dextrose bolus **OR** dextrose bolus    Exam:   Vitals:    05/17/22 0704   BP:    Pulse:    Resp: 18   Temp:    SpO2:      I/O last 3 completed shifts:   In: 840 [P.O.:840]  Out: 4710 [Urine:4710]   General appearance: alert, appears stated age and cooperative  HEENT: PERRLA  Neck: no adenopathy, no carotid bruit, no JVD, supple, symmetrical, trachea midline and thyroid not enlarged, symmetric, no tenderness/mass/nodules  Lungs: clear to auscultation bilaterally  Heart: regular rate and rhythm, S1, S2 normal, no murmur, click, rub or gallop  Abdomen: soft, non-tender; bowel sounds normal; no masses,  no organomegaly  Extremities: extremities normal, atraumatic, no cyanosis or edema     Labs:  CBC:   Recent Labs     05/16/22  1501   WBC 8.3   HGB 9.6*   HCT 27.4*   MCV 93.1        BMP:   Recent Labs     05/16/22  1501   *   K 4.5   CL 99   CO2 24   BUN 20   CREATININE 1.4*     LIVER PROFILE: No results for input(s): AST, ALT, LIPASE, PROT, BILIDIR, BILITOT, ALKPHOS in the last 72 hours. Invalid input(s): AMYLASE,  ALB  PT/INR: No results for input(s): INR in the last 72 hours. Invalid input(s): PT    IMAGING:  No results found. Hospital Problems           Last Modified POA    * (Principal) Critical illness myopathy 4/13/2022 Yes    Moderate malnutrition (HCC) (Chronic) 4/14/2022 Yes         Impression:  43 yo male with nausea and constipation/diarrhea    Recommendation:  1. Candidal esophagitis: finish 3 week course of diflucan  2. Anastomotic ulcer: continue bid ppi x 8 weeks, po carafate x 4 weeks. Will likely need repeat endoscopy in 6-8 weeks  3. Patient endorses improvement. Follow up as outpatient.       Johan Gómez DO  7:28 AM 5/17/2022            [facility]    [address]      [address] [place] [address]     Phone: [phone]     Fax: [phone]

## 2022-05-17 NOTE — CARE COORDINATION
CASE MANAGEMENT DISCHARGE SUMMARY      Discharge to: [address] Subacute skilled nursing facillity    Precertification completed: No  Hospital Exemption Notification (HENS) completed: Yes,Document ID : [id number]    IMM given: N/A, commercial and medicaid    New Durable Medical Equipment ordered/agency: Defer to SNF    Transportation:       Medical Transport explained to [employer]. Pt/family voice no agency preference. Agency used: [facility] ([phone])   time: 12:00pm   Ambulance form completed: Yes    Confirmed discharge plan with:     Patient: yes, bedside 5/16     Family:  Yes, bedside 5/16   Name:Juarez Osei       Contact number: [phone]     Facility/Agency, name:      [facility].   [address]   [phone]: DELIO/AVS faxed     Phone number for report to facility: [phone]     RN, name: Hailey Coombs RN    Note: Discharging nurse to complete DELIO, reconcile AVS, and place final copy with patient's discharge packet. RN to ensure that written prescriptions for  Level II medications are sent with patient to the facility as per protocol.

## 2022-05-17 NOTE — PROGRESS NOTES
Occupational Therapy  Discharge Summary     Name:Clint Osei  [facility]:[id number]  :1975  Treatment Diagnosis: impaired strength and activity tolerance  Diagnosis: sepsis    Restrictions/Precautions  Restrictions/Precautions: General Precautions,Fall Risk           Position Activity Restriction  Other position/activity restrictions: RUE PICC, no wound vac on this date, álvaro     Goals:   Short Term Goals  Time Frame for Short term goals: Pt will complete UB dressing with min A- GOAL MET; Pt SBA for UB dressing at EOB  Short Term Goal 1: Pt will complete LB dressing with mod A-NOT MET, max A x1   Short Term Goal 2: Pt will complete LB bathing with mod A-GOAL MET   Short Term Goal 3: Pt will complete UB bathing with SBA-GOAL MET   Short Term Goal 4: Pt will complete 3 grooming task at w/c level-GOAL MET   Short Term Goal 5: Pt will complete x20 reps of BUE HEP to improve UB strength/endurance for repositioning and UB ADLs- GOAL MET ; Pt completing x20 BUE AROM  Additional Goals?: No   Long Term Goals  Time Frame for Long term goals : 3 weeks ()  Long Term Goal 1: Pt will complete total body dressing with supv-GOAL PARTIALLY MET , setup/SPV UE, mod-maxA LE  Long Term Goal 2: Pt will complete total body bathing with supv-GOAL PARTIALLY MET , setup/SPV UE, mod-maxA LE  Long Term Goal 3: Pt will complete 3 grooming tasks at sink with mod I-GOAL MET 22 Pt completed 3 grooming tasks at sink with mod I.  Long Term Goal 4: Pt will perform functional transfers with mod I-DOWNGRADE TO MODA X1 due to inconsistent progress. PARTIALLY MET_ mod-max Ax1   Additional Goals?: No    Pt. Met 5/ short term goals and 0/ long term goals.      Current Functional Status:   ADL  Feeding  Assistance Level: Set-up  Skilled Clinical Factors: sitting upright in bed  Grooming/Oral Hygiene  Assistance Level: Modified independent  Skilled Clinical Factors: seated in w/c, completed washing face, combing hair, and brushing teeth  Upper Extremity Bathing  Skilled Clinical Factors: Pt refused bathing this date. Lower Extremity Bathing  Skilled Clinical Factors: Pt refused bathing this date. Upper Extremity Dressing  Assistance Level: Set-up  Skilled Clinical Factors: to don shirt  Lower Extremity Dressing  Skilled Clinical Factors: Pt refused changing clothes this date. Putting On/Taking Off Footwear  Assistance Level: Dependent  Skilled Clinical Factors: to don shoes  Toileting  Assistance Level: Dependent  Skilled Clinical Factors: rea catheter, frequently incontinent of bowel. Toilet Transfers  Skilled Clinical Factors: Pt refused transfer to [facility] or toilet this date.        Functional Mobility  Device: Wheelchair  Activity: To/From bathroom  Assistance Level: Supervision  Skilled Clinical Factors: cues to turn and back out of bathroom  Bed Mobility  Overall Assistance Level: Minimal Assistance  Sit to Supine  Assistance Level: Minimal assistance  Supine to Sit  Assistance Level: Supervision  Skilled Clinical Factors: HOB elevated use of bed rail  Scooting  Assistance Level: Maximum assistance  Transfers  Surface: From bed; Wheelchair  Additional Factors: Increased time to complete; Mat raised; With handrails;Verbal cues; Hand placement cues  Device: Walker  Sit to Stand  Assistance Level: Dependent  Skilled Clinical Factors: mod A of 2 to stand to RW  Stand to Sit  Assistance Level: Dependent  Skilled Clinical Factors: mod/max A of 2 to safely sit in w/c or bed after transfers  Bed To/From Chair  Technique: Stand step  Assistance Level: Dependent  Skilled Clinical Factors: mod A of 2 with RW  Stand Pivot  Assistance Level: Dependent  Skilled Clinical Factors: mod A of 2 with RW  Car Transfer  Assistance Level: Dependent  Skilled Clinical Factors: mod A of 2                                      Perception  Overall Perceptual Status: WFL            Assessment:   Assessment: Pt agreeable to OT/PT session on arrival. Pt completed supine>sit with SPV and mod A of 2 to stand from EOB to RW. Pt completed stand step transfer to Health system with RW and min/mod A of 2. Pt discussed with MD after transfer into Health system about discharge home vs SNF. Pt became very agitated and refused therapy for remainder of day. PT/OT provided therapeutic listening and pt agreeable to brushing teeth while seated in /. Pt completed grooming with mod I. Pt completed w/c mobility to/from gym with mod I/SPV and increased time. Pt completed stand pivot w/c>car with min/mod A of 2 and mod A of 2 for sit>stand from seat. Pt stood from / in room with mod A of 2 and ambulated 10 feet to bed, requiring mod/max A of 2 to safely turn to bed due to fatiguing of LEs and buckling of R knee. Pt refused any further transfers or getting out of bed for remainder of session. Pt required co-treat due to decreased LE strength and poor coordination requiring assist with transfers and ambulation. Pt completed final 30 minutes of OT only session in bed for BUE ther ex. Pt performed 15 reps with 3# weight for wrist and elbow exercises but B shoulder pain limited shoulder movement. Pt completed AAROM for shoulder exercises. Pt verbalized understanding of all exercises and recommendations. Pt refused bathing and dressing this date. Continue POC. Prognosis: Fair  Barriers to Learning: none  REQUIRES OT FOLLOW-UP: Yes  Discharge Recommendations: Subacute/Skilled Nursing Facility    Equipment Recommendations:  Defer to next level of care         Home Exercise Program  Provided Pt with handout for BUE exercises. Pt verbalized understanding of all exercises.     Electronically signed by Monalisa Santos OT on 5/17/2022 at 6:58 AM

## 2022-05-17 NOTE — PROGRESS NOTES
Physical Therapy  Discharge Summary    Name:Clint Osei  LFX:[id number]  :1975  Treatment Diagnosis: impaired strength and activity tolerance  Diagnosis: sepsis    Restrictions/Precautions  Restrictions/Precautions: General Precautions,Fall Risk           Position Activity Restriction  Other position/activity restrictions: RUE PICC, no wound vac on this date, álvaro     Goals:                  Short Term Goals  Time Frame for Short term goals: 10 days- 22  Short term goal 1: Pt will complete bed mobility with min A; goal partially met  - pt completes supine>sit with min A and up to mod A x2 for sit>supine  Short term goal 2: Pt will complete functional transfers with max A x 1 using LRAD; : GOAL MET, CGA x 2 to CGA + Luisa sit pivot w/c to/from EOM  Short term goal 3: Pt will propel manual w/c 50ft with min A; goal met  - pt propels manual w/c 75 ft with min A  Short term goal 4: Pt will tolerate gait assessment and appropriate LTG to be set -- : GOAL MET 3' in // bars Luisa/modA x 2            Long Term Goals  Time Frame for Long term goals : 3 weeks- 22 - goals extended to  to reflect updated d/c plan  Long term goal 1: Pt will complete bed mobility with supervision; -- GOAL NOT MET : Supervision for rolling, Luisa supine to sit/ sit to supine  Long term goal 2: Pt will complete functional transfers with min A using LRAD -- GOAL MET : CGA/Luisa sit pivot t/f, modA x 2 STS with RW  Long term goal 3: Pt will propel manual w/c 150ft with mod I -- GOAL MET : 150' MI  Long term goal 4: Pt will ambulate x 10' in // bars with Luisa x 2 --  GOAL NOT MET : x 5' in // bars TD    Pt. Met 4/ short term goals and 2/4 long term goals. LTG for ambulation and functional t/f not met at this time d/t decreased activity tolerance, balance, strength and safety, pt continues to require assist for mobility. Pt d/c to SNF  for continued skilled PT.       Discharge PT IRF:    CARE Score: 4                                   Pt. Currently ambulates 10 feet with RW and modA/maxA x   Sit to/from stand with Luisa/modA x 2; sit pivot t/f with CGA/Luisa x 1  Bed mobility with Luisa supine to/from sit, S for rolling  Recommend continued skilled PT in order to continue improvements in strength, balance, activity tolerance and improve I with mobility to decrease burden of care  Pt. Safe to d/c to SNF with assistance from staff and continued skilled PT. Provided pt. with written HEP and instructed in completion of exercises.     Electronically signed by Jazmin Cid PT on 5/17/2022 at 7:51 AM

## 2022-08-02 NOTE — TELEPHONE ENCOUNTER
----- Message from Angelito Moore MD sent at 8/1/2022  9:24 PM EDT -----  Contact: Sade Jha [phone] (wife)  [phone] [address] to do  He has severe diabetic neuropathy and several toes been taken off  ----- Message -----  From: Orion Mendez MA  Sent: 8/1/2022   4:09 PM EDT  To: Angelito Moore MD    Pt would like to get a RX for a handicap placard please will  when ready

## 2022-08-29 NOTE — ED PROVIDER NOTES
Emergency Physician Note        Note Open Time: 3:15 AM EDT    Chief Complaint  Cardiac arrest    History of Present Illness  Daniela Vidales is a 42 y.o. male who presents to the ED for cardiac arrest.  Patient gives no history. EMS reports that the patient was discharged from the hospital on Friday and was short of breath overnight and they were called but the patient refused transportation because he stated he had relieved a temporary obstruction in the trach and EMS reports that he got about 2 miles down the road before they were called to return to the patient's residence again for shortness of breath and this time they found the patient with his tracheostomy no longer cannulated in respiratory and cardiac arrest.  EMS reports the patient had a rhythm of asystole on their first tracing. They report over 40 minutes of ACLS care prior to arrival in the ER with at least 3 doses of epinephrine given. Patient has access through left tibial IO. They were able to pass an ET tube through the tracheostomy and has been ventilating through this. History and review of systems limited by patient's mental status and acuity. I have reviewed the following from the nursing documentation:      Prior to Admission medications    Medication Sig Start Date End Date Taking? Authorizing Provider   gabapentin (NEURONTIN) 100 MG capsule Take 2 capsules by mouth 3 times daily for 30 days.  5/16/22 6/15/22  Ayaan Williamson MD   metoprolol succinate (TOPROL XL) 25 MG extended release tablet Take 1 tablet by mouth 2 times daily 5/16/22   Ayaan Williamson MD   NIFEdipine (PROCARDIA XL) 30 MG extended release tablet Take 1 tablet by mouth daily 5/16/22   Ayaan Williamson MD   tiZANidine (ZANAFLEX) 4 MG tablet Take 1 tablet by mouth in the morning, at noon, and at bedtime 5/16/22   Ayaan Williamson MD   sucralfate (CARAFATE) 1 GM tablet Take 1 tablet by mouth 4 times daily 5/16/22   Ayaan Williamson MD aluminum & magnesium hydroxide-simethicone (MAALOX) 200-200-20 MG/5ML SUSP suspension Take 30 mLs by mouth every 6 hours as needed for Indigestion 5/6/22   Emerson Romero MD   heparin, porcine, 5000 UNIT/ML injection Inject 1 mL into the skin every 8 hours 5/6/22   Emerson Romero MD   PARoxetine (PAXIL) 10 MG tablet Take 2 tablets by mouth every morning 5/6/22   Emerson Romero MD   insulin glargine (LANTUS SOLOSTAR) 100 UNIT/ML injection pen Inject 25 Units into the skin nightly 5/6/22   Emerson Romero MD   ondansetron (ZOFRAN-ODT) 4 MG disintegrating tablet Take 1 tablet by mouth every 8 hours as needed for Nausea or Vomiting 5/6/22   Emerson Romero MD   prochlorperazine (COMPAZINE) 5 MG tablet Take 1 tablet by mouth every 6 hours as needed for Nausea 5/6/22   Emerson Romero MD   fluticasone [facility]) 50 MCG/ACT nasal spray 2 sprays by Each Nostril route at bedtime 5/6/22   Emerson Romero MD   diclofenac sodium (VOLTAREN) 1 % GEL Apply 4 g topically 4 times daily as needed for Pain 5/6/22   Emerson Romero MD   mineral oil-hydrophilic petrolatum (AQUAPHOR) ointment Apply topically as needed. 5/7/22   Emerson Romero MD   tamsulosin [facility]) 0.4 MG capsule Take 1 capsule by mouth at bedtime 5/6/22   Emerson Romero MD   metoclopramide (REGLAN) 10 MG tablet Take 1 tablet by mouth 4 times daily (before meals and nightly) 5/6/22   Emerson Romero MD   Multiple Vitamins-Minerals (THERAPEUTIC MULTIVITAMIN-MINERALS) tablet Take 1 tablet by mouth daily 5/7/22   Emerson Romero MD   insulin lispro (HUMALOG) 100 UNIT/ML injection vial Inject 0-18 Units into the skin 4 times daily (before meals and nightly) 3/10/22   Levar Banks MD   pantoprazole (PROTONIX) 40 MG tablet Take 1 tablet by mouth 2 times daily (before meals) 3/10/22   Levar Banks MD   blood glucose test strips (ONETOUCH ULTRA) strip USE TO TEST BLOOD GLUCOSE DAILY. DX: E11.9 3/5/21   Aaron Scales MD   Blood Glucose Monitoring Suppl (CONTOUR NEXT EZ) w/Device KIT Use to test glucose daily. DX:E11.9 12/10/20   Aaron Scales MD   Insulin Pen Needle 32G X 6 MM MISC Use with insulin daily . DX:E11.9 12/4/20   Aaron Scales MD   blood glucose monitor strips Use to test blood sugar daily.   DX:E11.9 12/4/20   Aaron Scales MD       Allergies as of 08/29/2022 - Fully Reviewed 05/17/2022   Allergen Reaction Noted    Januvia [sitagliptin] Nausea Only 09/01/2014    Metformin and related  08/29/2019    Vancomycin  08/24/2019    Mustard oil [allyl isothiocyanate] Swelling and Rash 05/21/2013       Past Medical History:   Diagnosis Date    Abscess of left foot 1/24/2022    Abscess of left hand     Acute encephalopathy     Acute hypoxemic respiratory failure ([address]     Acute osteomyelitis of left hand ([address] 2/7/2022    Acute osteomyelitis of right hallux ([address] 08/2019    Cardiopulmonary arrest ([address]     Cellulitis of left foot 7/26/2020    CHF (congestive heart failure) ([address] 09/2014    presumably from viral myocarditis    Chronic osteomyelitis of left foot ([address] 10/27/2020    Closed displaced fracture of distal phalanx of right little finger 4/8/2021    Clostridium difficile infection 11/01/2016    Diabetic ulcer of left forefoot associated with type 2 diabetes mellitus, with necrosis of bone ([address] 8/1/2019    Diabetic ulcer of right great toe associated with type 2 diabetes mellitus, with necrosis of bone ([address] 08/2019    Enterococcal infection 2/3/2022    Failed soft 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 toe amputation site 10/26/2020    Hemodialysis patient [facility])     acute dialysis while in hospital. resolved    History of blood transfusion     History of hyperbaric oxygen therapy 10/28/2020    DFU- carmichael 3 - compromised flap    Hyperlipidemia     Hypertension     Infective tenosynovitis of extensor tendons of left hand 2/3/2022    Migraine     MSSA bacteremia 1/23/2022 Possible perforated tympanic membrane     as a result of recurrent otitis    Post-op hematoma of left foot 11/12/2020    Recurrent otitis media     Septic shock (HCC)     Staphylococcal arthritis of left foot ([facility] 2/1/2022    Syncope     Tear of medial meniscus of left knee     Tobacco use     Toe osteomyelitis, left ([facility] 7/24/2020    VAP (ventilator-associated pneumonia) [facility])         Surgical History:   Past Surgical History:   Procedure Laterality Date    ARM SURGERY Left 02/05/2022    LEFT HAND INCISION AND DRAINAGE performed by Edgar Ramirez MD at [address], ESOPHAGUS      bleeding ulcer correction    ENDOSCOPY, COLON, DIAGNOSTIC      FOOT DEBRIDEMENT Left 02/01/2022    ULCER DEBRIDEMENT LEFT FOOT performed by Leona Lauren DPM at [address] [place] [address] Left 3/8/2022    ULCER DEBRIDEMENT LEFT FOOT performed by Leona Lauren DPM at [facility] Right 02/25/2022    successful coil embolization of the gastroduodenal artery    IR NONTUNNELED VASCULAR CATHETER  02/11/2022    IR NONTUNNELED VASCULAR CATHETER 2/11/2022 [facility] SPECIAL PROCEDURES    KNEE ARTHROSCOPY Left 08/15/2013    LEFT KNEE ARTHROSCOPY , SYNOVECTOMY       LAPAROTOMY N/A 2/27/2022    PYLOROPLASTY, ULCER OVER-SEW, JEJUNOSTOMY TUBE INSERTION performed by Xu Lares MD at [address]  [facility] Left 07/24/2020    PARTIAL LEFT FOOT AMPUTATION    TOE AMPUTATION Right 08/23/2019    PARTIAL RIGHT FOOT AMPUTATION performed by Leona Lauren DPM at [address] [place] [address] Left 07/24/2020    PARTIAL LEFT FOOT AMPUTATION performed by Leona Lauren DPM at [address] [place] [address] 10/22/2020    PARTIAL LEFT FOOT AMPUTATION WITH GRAFT APPLICATION [facility] 02/17/2022    EGD W/ANES.  performed by Sal Welch MD at [address] [facility] N/A 2022    EGD DIAGNOSTIC ONLY performed by Javier Carrillo MD at [address] [facility] N/A 2022    EGD BIOPSY performed by Elliott Keen DO at [address] [facility]  2022    EGD DILATION GASTRIC OUTLET [facility] EXTRACTION          Family History:    Family History   Problem Relation Age of Onset    Diabetes Mother     High Blood Pressure Mother     High Cholesterol Mother     Diabetes Father     High Blood Pressure Father     High Cholesterol Father     Stroke Father     High Blood Pressure Sister     High Blood Pressure Maternal Uncle     High Cholesterol Maternal Uncle     High Blood Pressure Maternal Grandmother        Social History     Socioeconomic History    Marital status:      Spouse name: Rafa Ramos    Number of children: 1    Years of education: 12    Highest education level: Not on file   Occupational History    Not on file   Tobacco Use    Smoking status: Former     Packs/day: 0.50     Years: 2.00     Pack years: 1.00     Types: Cigarettes     Quit date: 2005     Years since quittin.0    Smokeless tobacco: Former     Quit date: 2005    Tobacco comments:     SMOKED OCCASIONALLY UNTIL 8 YEARS AGO   Vaping Use    Vaping Use: Never used   Substance and Sexual Activity    Alcohol use: Not Currently     Comment: RARELY    Drug use: No    Sexual activity: Yes     Partners: Female   Other Topics Concern    Not on file   Social History Narrative    Not on file     Social Determinants of Health     Financial Resource Strain: Not on file   Food Insecurity: Not on file   Transportation Needs: Not on file   Physical Activity: Not on file   Stress: Not on file   Social Connections: Not on file   Intimate Partner Violence: Not on file   Housing Stability: Not on file       Nursing notes reviewed.     ED Triage Vitals [22 4362]   Enc Vitals Group      BP       Pulse       Resp       Temp       Temp src       SpO2 90 %      Weight       Height       Head Circumference       Peak Flow       Pain Score       Pain Loc       Pain Edu? Excl. in [address]? GENERAL: Unresponsive. HENT:  Normocephalic, Atraumatic, moist mucous membranes. EYES: Pupils dilated and unresponsive bilaterally   NECK:  No meningeal signs, Supple. CHEST: No heart sounds. LUNGS: Rhonchorous bilateral breath sounds with bag valve respirations. ABDOMEN:  Soft, non-distended  EXTREMITIES: Femoral and carotid pulses palpable during chest compressions. Cool extremities. Status post right great toe amputation. SKIN: Cool, dry and intact. NEUROLOGIC: Unresponsive, GCS 3. LABS  Labs Reviewed   BLOOD GAS, VENOUS - Abnormal; Notable for the following components:       Result Value    pH, Brody 7.070 (*)     pCO2, Brody 56.1 (*)     HCO3, Venous 15.9 (*)     Base Excess, Brody -13.8 (*)     Carboxyhemoglobin 1.7 (*)     All other components within normal limits    Narrative:     Tammy Fischer  [employer] tel. [phone],  Chemistry results called to and read back by DR RINCON, 08/29/2022 03:19, by  [facility]   CBC WITH AUTO DIFFERENTIAL - Abnormal; Notable for the following components:    RBC 2.85 (*)     Hemoglobin 9.0 (*)     Hematocrit 27.3 (*)     All other components within normal limits   COMPREHENSIVE METABOLIC PANEL W/ REFLEX TO MG FOR LOW K - Abnormal; Notable for the following components:    Sodium 135 (*)     Potassium reflex Magnesium 5.4 (*)     Chloride 114 (*)     CO2 18 (*)     Glucose 308 (*)     BUN 24 (*)     Total Protein 5.5 (*)     Albumin 3.2 (*)     All other components within normal limits   TROPONIN       MEDICAL DECISION MAKING        After the patient arrived we continued the ACLS care that was being given. Leonetta Soho device was providing compressions and patient was being bagged through an ET tube passing through the tracheostomy.   End-tidal CO2 measurements were around 20 on arrival.  While he was in the ER this trended down to 10 and remained 10 or less for more than 10 minutes. He remained easy to bag throughout his time in the ER. EMS reports that they did have some bradycardia PEA rhythms. They gave 3 doses of epinephrine. While in the ER we continued ACLS care and gave bicarb and calcium and epinephrine and his rhythm was bradycardia PEA on a few checks and then changed to asystole and remained asystole on multiple pulse checks. At no time was any pulse palpable during any of the pauses. At 3:10 AM he was declared . After the shot was declared  the wife and uncle were present and I was able to speak with them. They informed me that the patient had initially refused transport because he felt he had relieved some sort of obstruction and his tracheostomy tube but after EMS went down the street apparently his shortness of breath returned and he asked his wife to remove his tracheostomy tube. She reports also that he was still speaking when EMS arrived and was able to walk and while walking to the stretcher collapsed. This report in combination with his blood gas lead me to believe the most likely cause for his death was a massive pulmonary embolism. There was significant mismatch between his venous CO2 measurements and our  end-tidal CO2 measurements. Final Impression    1. Cardiac arrest (HCC)        SpO2 90 %. Disposition   at 0310    This chart was generated using the [address] [place] [address] dictation system. I created this record but it may contain dictation errors.          Paco Smart MD  22 0762

## 2022-08-29 NOTE — ED NOTES
Spoke with Joseph from [facility] who reports that they will place a hold on the patient while waiting to complete a chart review. Reference # [id number].       Edmundo Bennett RN  08/29/22 [id number]

## 2022-08-29 NOTE — ED NOTES
75 mcg fentanyl patch removed from left arm by writer. Fentanyl patch disposed of with witness, Harris Iverson RN.       Chryl Habermann, RN  08/29/22 3389

## 2022-08-29 NOTE — ED TRIAGE NOTES
Pt arrived via EMS, full code in progress. Pt was released from NH on Friday and had a home nurse every day except Sunday per wife. Pt had clog in his trach tube and wife had removed to attempt cleaning. Pt ambulated to EMS cot and collapsed during ambulation. See code charting and EMS run report for more information.

## 2022-08-29 NOTE — ED NOTES
Call to PCP Dr Edmund Saint who discussed pt code and stated he would sign the death certificate.       Linda Taylor  08/29/22 0326

## 2022-11-10 NOTE — PROGRESS NOTES
[address]  Hyperbaric Oxygen    Matthew Black     : 1975    DATE OF VISIT:  2020    Subjective     Matthew Black is a 39 y.o. male who  has a past medical history of Acute osteomyelitis of right hallux ([facility] (2019), Cellulitis of left foot (2020), CHF (congestive heart failure) ([facility] (2014), Clostridium difficile infection (2016), Diabetic ulcer of right great toe associated with type 2 diabetes mellitus, with necrosis of bone ([facility] (2019), Failed soft 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 toe amputation site (10/26/2020), History of hyperbaric oxygen therapy (10/28/2020), Migraine, Possible perforated tympanic membrane, Post-op hematoma of left foot (2020), Recurrent otitis media, Tear of medial meniscus of left knee, Tobacco use, and Toe osteomyelitis, left ([facility] (2020). He presents to the [facility] today for hyperbaric oxygen treatment of his Reyna 3 DFU, which is refractory to standard therapy for 30 days. Patient denies fever. Patient denies nausea, vomiting or diarrhea; no ear troubles and no other new complaints; no fears or anxiety regarding treatment today. Objective     Vitals:    20 0802 20 1015   BP: (!) 134/91 (!) 151/98   Pulse: 101 94   Resp: 18 18   Temp: 98 °F (36.7 °C) 98.3 °F (36.8 °C)   TempSrc: Oral Oral       General:  Alert, cooperative, no distress. Ears: External otic canals are within acceptable limits. Teed grade 0 on the right and 1 on the left pre-treatment. Teed grade 1 on the right and 1 on the left post-treatment. Lungs:  Clear to auscultation bilaterally. Ulcer: Not examined today in HBO, if applicable. No results for input(s): POCGLU in the last 72 hours. Assessment     Matthew Black is a 39 y.o. male who presented to the [facility] today for hyperbaric oxygen treatment #36 of 50 for the diagnosis as stated above. Treatment given at 2 VINCENT for 90 minutes, with no air breaks. Total Treatment Time (min): 108 today, including compression, 100% oxygen at pressure, air breaks if applicable, and decompression. Patient Active Problem List   Diagnosis Code    Hypertension I10    Uncontrolled type 2 diabetes mellitus with diabetic polyneuropathy (HCC) E11.42, E11.65    Cardiomyopathy ([facility] I42.9    Mixed hyperlipidemia E78.2    Diabetic ulcer of left forefoot associated with type 2 diabetes mellitus, with necrosis of bone (HCC) E11.621, L97.524    Allergic rhinitis J30.9    Osteoarthritis M19.90    Surgical wound dehiscence, initial encounter T81.31XA    [facility]el[facility] left foot [facility] L54.578       In my clinical judgement, ongoing HBO therapy is necessary at this time, given a threat to patient function, limb or life from the current condition. Adjunctive Rx, objective weekly progress and goals of Rx will periodically be updated, on Mondays. Plan     1. Hyperbaric Oxygen - Stephon Osei tolerated the treatment well today without complications. Continue HBO treatment as outlined above. 2. Other - wound-care follow-up on Monday, at which point we'll decide if he needs to continue HBOT a little longer or not. Recent trend in wound depth has been encouraging. I was present on these premises and immediately available to furnish assistance & direction throughout the procedure.      -- Electronically signed by Marlin Hicks MD on 1/3/2021 at 3:42 PM No

## 2022-12-12 NOTE — ANESTHESIA PRE PROCEDURE
Department of Anesthesiology  Preprocedure Note       Name:  Brayan Dale   Age:  39 y.o.  :  1975                                          MRN:  [id number]         Date:  10/22/2020      Surgeon: Guevara Joseph):  Nadeem Fenton DPM    Procedure: Procedure(s):  PARTIAL LEFT FOOT AMPUTATION    Medications prior to admission:   Prior to Admission medications    Medication Sig Start Date End Date Taking? Authorizing Provider   amoxicillin-clavulanate (AUGMENTIN) 875-125 MG per tablet Take 1 tablet by mouth 2 times daily for 10 days 10/19/20 10/29/20 Yes Rudy Daniel DPM   gabapentin (NEURONTIN) 400 MG capsule TAKE 2 CAPSULES BY MOUTH THREE TIMES DAILY.  10/12/20 11/11/20 Yes Makenzie Cartwright MD   traZODone (DESYREL) 50 MG tablet Take 1 tablet by mouth nightly 20  Yes Makenzie Cartwright MD   Insulin Glargine, 1 Unit Dial, (TOUJEO SOLOSTAR) 300 UNIT/ML SOPN Inject 75 Units into the skin nightly 20  Yes Makenzie Cartwright MD   tiZANidine (ZANAFLEX) 4 MG tablet Take 2 tablets by mouth nightly 20  Yes Makenzie Cartwright MD   Aspirin-Acetaminophen-Caffeine (EXCEDRIN MIGRAINE PO) Take 2 capsules by mouth as needed    Yes Historical Provider, MD   carvedilol (COREG) 12.5 MG tablet Take 1 tablet by mouth 2 times daily (with meals) 20  Yes Makenzie Cartwright MD   furosemide (LASIX) 20 MG tablet Take 1 tablet by mouth daily 20  Yes Makenzie Cartwright MD   glimepiride (AMARYL) 4 MG tablet Take 1 tablet by mouth every morning (before breakfast) 20  Yes Makenzie Cartwright MD   PARoxetine (PAXIL) 10 MG tablet Take 1 tablet by mouth every morning 20  Yes Makenzie Cartwright MD   loratadine (CLARITIN) 10 MG tablet Take 10 mg by mouth nightly as needed    Yes Historical Provider, MD   simvastatin (ZOCOR) 20 MG tablet TAKE 1 TABLET BY MOUTH NIGHTLY 20  Yes Makenzie Cartwright MD   losartan (COZAAR) 50 MG tablet TAKE 1 TABLET BY MOUTH DAILY 20  Yes Makenzie Cartwright MD ammonium lactate (LAC-HYDRIN) 12 % cream Apply topically 2x daily. 9/5/19  Yes Flip Crisostomo DPM   insulin lispro (HUMALOG KWIKPEN) 100 UNIT/ML pen Inject 15 Units into the skin 3 times daily (before meals)  Patient taking differently: Inject into the skin 3 times daily (before meals) Patient takes per sliding scale plus he adds 15 units to it 8/26/19  Yes Everlean Fleischer, MD   sildenafil (VIAGRA) 100 MG tablet TAKE 1 TABLET BY MOUTH AS NEEDED FOR ERECTILE DYSFUNCTION 8/27/19   Alia Varela MD   Insulin Syringe-Needle U-100 (AIMSCO INS SYR .3CC/29GX0.5\") 29G X 1/2\" 0.3 ML MISC 1 each by Does not apply route daily 8/22/19   Zakiya Hernandez PA-C   blood glucose test strips (CONTOUR NEXT TEST) strip USE TO TEST BLOOD SUGAR LEVELS 3 TIMES DAILY 8/8/19   Alia Varela MD   Lancets MISC Test three times a day with Contour Next EZ machine. DX: E11.9 11/16/17   Alia Varela MD   glucose blood VI test strips (EXACTECH TEST) strip 1 each by In Vitro route daily. As needed. 9/5/14   Alia Varela MD       Current medications:    Current Facility-Administered Medications   Medication Dose Route Frequency Provider Last Rate Last Dose    ceFAZolin (ANCEF) 2 g in sterile water 20 mL IV syringe  2 g Intravenous Once Radha [place], DPM        lactated ringers infusion   Intravenous Continuous Althea Gary  mL/hr at 10/22/20 0392         Allergies: Allergies   Allergen Reactions    Januvia [Sitagliptin] Nausea Only     Has taken metformin without side effects in the past.  Nausea with Janumet in the past.     Metformin And Related      GI Upset    Vancomycin      Pt had red face, swelling itching of eyelids, sore throat after receiving vancmomyin and cefepime. I think this was a histamine releasing reaction from vancomycin most likely.  The cefepime was switched to Zosyn and patient had no reaction to Zosyn    Mustard Oil [Allyl Isothiocyanate] Swelling and Rash Problem List:    Patient Active Problem List   Diagnosis Code    Hypertension I10    Uncontrolled type 2 diabetes mellitus with diabetic polyneuropathy ([address] E11.42, E11.65    Cardiomyopathy ([address] I42.9    Mixed hyperlipidemia E78.2    Diabetic ulcer of toe of left foot associated with type 2 diabetes mellitus, with necrosis of bone ([address] E11.621, L97.524    Allergic rhinitis J30.9    Arthritis M19.90    Osteoarthritis M19.90       Past Medical History:        Diagnosis Date    Acute osteomyelitis of right hallux ([address] 08/2019    CHF (congestive heart failure) ([facility])     Clostridium difficile infection 11/01/2016    Diabetic ulcer of right great toe associated with type 2 diabetes mellitus, with necrosis of bone ([address] 08/2019    Diet-controlled type 2 diabetes mellitus ([address]     Hyperlipidemia     Hypertension     Migraine     Tear of medial meniscus of left knee     Tobacco use     Toe osteomyelitis, left ([address] 7/24/2020       Past Surgical History:        Procedure Laterality Date    KNEE ARTHROSCOPY Left [id number]    LEFT KNEE ARTHROSCOPY , SYNOVECTOMY       OTHER SURGICAL HISTORY Left 07/24/2020    PARTIAL LEFT FOOT AMPUTATION    TOE AMPUTATION Right 8/23/2019    PARTIAL RIGHT FOOT AMPUTATION performed by Alicia Lisa DPM at [facility] TOE AMPUTATION Left 7/24/2020    PARTIAL LEFT FOOT AMPUTATION performed by Alicia Lisa DPM at [address] [place] [address] EXTRACTION         Social History:    Social History     Tobacco Use    Smoking status: Former Smoker     Packs/day: 0.50     Years: 2.00     Pack years: 1.00     Last attempt to quit: 8/13/2005     Years since quitting: 15.2    Smokeless tobacco: Former User     Quit date: 8/13/2005    Tobacco comment: SMOKED OCCASIONALLY UNTIL 8 YEARS AGO   Substance Use Topics    Alcohol use: Yes     Comment: RARELY                                Counseling given: Not Answered  Comment: SMOKED OCCASIONALLY UNTIL 8 YEARS AGO      Vital Signs (Current):   Vitals:    10/20/20 1210 10/22/20 0640 10/22/20 0641   BP:  (!) 169/105 (!) 142/98   Pulse:  100    Resp:  16    Temp:  97 °F (36.1 °C)    TempSrc:  Temporal    SpO2:  100%    Weight: 260 lb (117.9 kg) 260 lb (117.9 kg)    Height: 6' (1.829 m) 6' (1.829 m)                                               BP Readings from Last 3 Encounters:   10/22/20 (!) 142/98   10/19/20 (!) 144/95   10/12/20 (!) 152/99       NPO Status: Time of last liquid consumption: 0530                        Time of last solid consumption: 2130                        Date of last liquid consumption: 10/22/20(sip water AM meds)                        Date of last solid food consumption: 10/21/20    BMI:   Wt Readings from Last 3 Encounters:   10/22/20 260 lb (117.9 kg)   10/19/20 261 lb 6.4 oz (118.6 kg)   10/12/20 261 lb 12.8 oz (118.8 kg)     Body mass index is 35.26 kg/m².     CBC:   Lab Results   Component Value Date    WBC 10.5 07/25/2020    RBC 3.51 07/25/2020    HGB 11.4 07/25/2020    HCT 32.7 07/25/2020    MCV 93.2 07/25/2020    RDW 12.6 07/25/2020     07/25/2020       CMP:   Lab Results   Component Value Date     07/25/2020    K 3.5 07/25/2020    CL 97 07/25/2020    CO2 20 07/25/2020    BUN 11 07/25/2020    CREATININE 0.7 07/25/2020    GFRAA >60 07/25/2020    AGRATIO 1.0 07/24/2020    LABGLOM >60 07/25/2020    LABGLOM 103 09/14/2015    GLUCOSE 280 07/25/2020    PROT 7.1 07/24/2020    CALCIUM 7.6 07/25/2020    BILITOT 1.2 07/24/2020    ALKPHOS 281 07/24/2020    AST 28 07/24/2020    ALT 22 07/24/2020       POC Tests:   Recent Labs     10/22/20  0646   POCGLU 323*       Coags:   Lab Results   Component Value Date    PROTIME 12.1 08/06/2019    INR 1.06 08/06/2019    APTT 26.2 09/01/2014       HCG (If Applicable): No results found for: PREGTESTUR, PREGSERUM, HCG, HCGQUANT     ABGs:   Lab Results   Component Value Date    PHART 7.471 09/01/2014    PO2ART 61.3 09/01/2014    VTR5ZRB 27.5 09/01/2014 DUQ1NSD 19.6 09/01/2014    BEART -2.1 09/01/2014    O3QGHTFD 93.3 09/01/2014        Type & Screen (If Applicable):  No results found for: LABABO, LABRH    Drug/Infectious Status (If Applicable):  No results found for: HIV, HEPCAB    COVID-19 Screening (If Applicable):   Lab Results   Component Value Date    COVID19 Not Detected 10/20/2020         Anesthesia Evaluation  Patient summary reviewed no history of anesthetic complications:   Airway: Mallampati: III  TM distance: <3 FB   Neck ROM: full  Mouth opening: < 3 FB Dental:          Pulmonary:Negative Pulmonary ROS breath sounds clear to auscultation      (-) COPD, asthma, shortness of breath, sleep apnea and not a current smoker          Patient did not smoke on day of surgery. Cardiovascular:    (+) hypertension:, hyperlipidemia    (-) pacemaker, valvular problems/murmurs, past MI, CAD, CABG/stent, dysrhythmias and  angina    ECG reviewed  Rhythm: regular  Rate: normal           Beta Blocker:  Dose within 24 Hrs         Neuro/Psych:   (+) neuromuscular disease (neuropathy, hands and feet):, headaches: migraine headaches,    (-) seizures, TIA, CVA and psychiatric history           GI/Hepatic/Renal:   (+) liver disease (elev lfts):,      (-) GERD, no renal disease, bowel prep and no morbid obesity       Endo/Other:    (+) DiabetesType II DM, using insulin, : arthritis:., no malignancy/cancer. (-) hypothyroidism, hyperthyroidism, blood dyscrasia, no malignancy/cancer               Abdominal:   (+) obese,     Abdomen: soft. Vascular: negative vascular ROS. Anesthesia Plan      TIVA     ASA 3       Induction: intravenous. MIPS: Postoperative opioids intended and Prophylactic antiemetics administered. Anesthetic plan and risks discussed with patient. Plan discussed with CRNA.           This pre-anesthesia assessment may be used as a history and physical.    DOS STAFF ADDENDUM:    Pt seen and normal (ped)...

## 2023-04-07 NOTE — PROGRESS NOTES
Report given to [facility] RN-transport requested Vaccine status unknown

## 2023-05-21 NOTE — PROGRESS NOTES
Patient updates given to [facility]. Patient continues on CRRT and TTM. Levophed has been weaned from 30mcg/min to 6mcg/min. Care transferred. Spine appears normal, movement of extremities grossly intact. Attending Attestation (For Attendings USE Only)...
